# Patient Record
Sex: MALE | Race: ASIAN | NOT HISPANIC OR LATINO
[De-identification: names, ages, dates, MRNs, and addresses within clinical notes are randomized per-mention and may not be internally consistent; named-entity substitution may affect disease eponyms.]

---

## 2018-12-31 ENCOUNTER — TRANSCRIPTION ENCOUNTER (OUTPATIENT)
Age: 68
End: 2018-12-31

## 2018-12-31 ENCOUNTER — INPATIENT (INPATIENT)
Facility: HOSPITAL | Age: 68
LOS: 0 days | Discharge: HOME | DRG: 287 | End: 2019-01-01
Attending: INTERNAL MEDICINE | Admitting: INTERNAL MEDICINE
Payer: MEDICAID

## 2018-12-31 VITALS
OXYGEN SATURATION: 98 % | WEIGHT: 179.9 LBS | HEIGHT: 66 IN | SYSTOLIC BLOOD PRESSURE: 147 MMHG | TEMPERATURE: 98 F | DIASTOLIC BLOOD PRESSURE: 84 MMHG | HEART RATE: 76 BPM | RESPIRATION RATE: 16 BRPM

## 2018-12-31 DIAGNOSIS — I21.3 ST ELEVATION (STEMI) MYOCARDIAL INFARCTION OF UNSPECIFIED SITE: ICD-10-CM

## 2018-12-31 DIAGNOSIS — Z95.1 PRESENCE OF AORTOCORONARY BYPASS GRAFT: Chronic | ICD-10-CM

## 2018-12-31 LAB
ANION GAP SERPL CALC-SCNC: 18 MMOL/L — HIGH (ref 5–17)
BUN SERPL-MCNC: 18 MG/DL — SIGNIFICANT CHANGE UP (ref 7–23)
CALCIUM SERPL-MCNC: 9.3 MG/DL — SIGNIFICANT CHANGE UP (ref 8.4–10.5)
CHLORIDE SERPL-SCNC: 98 MMOL/L — SIGNIFICANT CHANGE UP (ref 96–108)
CO2 SERPL-SCNC: 23 MMOL/L — SIGNIFICANT CHANGE UP (ref 22–31)
CREAT SERPL-MCNC: 0.88 MG/DL — SIGNIFICANT CHANGE UP (ref 0.5–1.3)
GLUCOSE BLDC GLUCOMTR-MCNC: 178 MG/DL — HIGH (ref 70–99)
GLUCOSE BLDC GLUCOMTR-MCNC: 190 MG/DL — HIGH (ref 70–99)
GLUCOSE SERPL-MCNC: 232 MG/DL — HIGH (ref 70–99)
HCT VFR BLD CALC: 42.7 % — SIGNIFICANT CHANGE UP (ref 39–50)
HGB BLD-MCNC: 14.6 G/DL — SIGNIFICANT CHANGE UP (ref 13–17)
MCHC RBC-ENTMCNC: 31.4 PG — SIGNIFICANT CHANGE UP (ref 27–34)
MCHC RBC-ENTMCNC: 34.3 GM/DL — SIGNIFICANT CHANGE UP (ref 32–36)
MCV RBC AUTO: 91.6 FL — SIGNIFICANT CHANGE UP (ref 80–100)
PLATELET # BLD AUTO: 227 K/UL — SIGNIFICANT CHANGE UP (ref 150–400)
POTASSIUM SERPL-MCNC: 4 MMOL/L — SIGNIFICANT CHANGE UP (ref 3.5–5.3)
POTASSIUM SERPL-SCNC: 4 MMOL/L — SIGNIFICANT CHANGE UP (ref 3.5–5.3)
RBC # BLD: 4.66 M/UL — SIGNIFICANT CHANGE UP (ref 4.2–5.8)
RBC # FLD: 12.3 % — SIGNIFICANT CHANGE UP (ref 10.3–14.5)
SODIUM SERPL-SCNC: 139 MMOL/L — SIGNIFICANT CHANGE UP (ref 135–145)
WBC # BLD: 8.9 K/UL — SIGNIFICANT CHANGE UP (ref 3.8–10.5)
WBC # FLD AUTO: 8.9 K/UL — SIGNIFICANT CHANGE UP (ref 3.8–10.5)

## 2018-12-31 PROCEDURE — 93459 L HRT ART/GRFT ANGIO: CPT | Mod: 26,GC

## 2018-12-31 PROCEDURE — 99152 MOD SED SAME PHYS/QHP 5/>YRS: CPT | Mod: GC

## 2018-12-31 PROCEDURE — 76937 US GUIDE VASCULAR ACCESS: CPT | Mod: 26,GC

## 2018-12-31 PROCEDURE — 93010 ELECTROCARDIOGRAM REPORT: CPT

## 2018-12-31 RX ORDER — ISOSORBIDE MONONITRATE 60 MG/1
120 TABLET, EXTENDED RELEASE ORAL DAILY
Qty: 0 | Refills: 0 | Status: DISCONTINUED | OUTPATIENT
Start: 2018-12-31 | End: 2019-01-01

## 2018-12-31 RX ORDER — INSULIN LISPRO 100/ML
VIAL (ML) SUBCUTANEOUS AT BEDTIME
Qty: 0 | Refills: 0 | Status: DISCONTINUED | OUTPATIENT
Start: 2018-12-31 | End: 2019-01-01

## 2018-12-31 RX ORDER — INSULIN LISPRO 100/ML
VIAL (ML) SUBCUTANEOUS
Qty: 0 | Refills: 0 | Status: DISCONTINUED | OUTPATIENT
Start: 2018-12-31 | End: 2019-01-01

## 2018-12-31 RX ORDER — DEXTROSE 50 % IN WATER 50 %
15 SYRINGE (ML) INTRAVENOUS ONCE
Qty: 0 | Refills: 0 | Status: DISCONTINUED | OUTPATIENT
Start: 2018-12-31 | End: 2019-01-01

## 2018-12-31 RX ORDER — BUDESONIDE AND FORMOTEROL FUMARATE DIHYDRATE 160; 4.5 UG/1; UG/1
2 AEROSOL RESPIRATORY (INHALATION)
Qty: 0 | Refills: 0 | Status: DISCONTINUED | OUTPATIENT
Start: 2018-12-31 | End: 2019-01-01

## 2018-12-31 RX ORDER — DEXTROSE 50 % IN WATER 50 %
12.5 SYRINGE (ML) INTRAVENOUS ONCE
Qty: 0 | Refills: 0 | Status: DISCONTINUED | OUTPATIENT
Start: 2018-12-31 | End: 2019-01-01

## 2018-12-31 RX ORDER — CLOPIDOGREL BISULFATE 75 MG/1
75 TABLET, FILM COATED ORAL DAILY
Qty: 0 | Refills: 0 | Status: DISCONTINUED | OUTPATIENT
Start: 2018-12-31 | End: 2019-01-01

## 2018-12-31 RX ORDER — ATORVASTATIN CALCIUM 80 MG/1
80 TABLET, FILM COATED ORAL AT BEDTIME
Qty: 0 | Refills: 0 | Status: DISCONTINUED | OUTPATIENT
Start: 2018-12-31 | End: 2019-01-01

## 2018-12-31 RX ORDER — DEXTROSE 50 % IN WATER 50 %
25 SYRINGE (ML) INTRAVENOUS ONCE
Qty: 0 | Refills: 0 | Status: DISCONTINUED | OUTPATIENT
Start: 2018-12-31 | End: 2019-01-01

## 2018-12-31 RX ORDER — METOPROLOL TARTRATE 50 MG
50 TABLET ORAL DAILY
Qty: 0 | Refills: 0 | Status: DISCONTINUED | OUTPATIENT
Start: 2018-12-31 | End: 2019-01-01

## 2018-12-31 RX ORDER — TAMSULOSIN HYDROCHLORIDE 0.4 MG/1
0.4 CAPSULE ORAL AT BEDTIME
Qty: 0 | Refills: 0 | Status: DISCONTINUED | OUTPATIENT
Start: 2018-12-31 | End: 2019-01-01

## 2018-12-31 RX ORDER — INSULIN DETEMIR 100/ML (3)
10 INSULIN PEN (ML) SUBCUTANEOUS AT BEDTIME
Qty: 0 | Refills: 0 | Status: DISCONTINUED | OUTPATIENT
Start: 2018-12-31 | End: 2018-12-31

## 2018-12-31 RX ORDER — GLUCAGON INJECTION, SOLUTION 0.5 MG/.1ML
1 INJECTION, SOLUTION SUBCUTANEOUS ONCE
Qty: 0 | Refills: 0 | Status: DISCONTINUED | OUTPATIENT
Start: 2018-12-31 | End: 2019-01-01

## 2018-12-31 RX ORDER — INSULIN GLARGINE 100 [IU]/ML
10 INJECTION, SOLUTION SUBCUTANEOUS AT BEDTIME
Qty: 0 | Refills: 0 | Status: DISCONTINUED | OUTPATIENT
Start: 2018-12-31 | End: 2019-01-01

## 2018-12-31 RX ORDER — LOSARTAN POTASSIUM 100 MG/1
100 TABLET, FILM COATED ORAL DAILY
Qty: 0 | Refills: 0 | Status: DISCONTINUED | OUTPATIENT
Start: 2018-12-31 | End: 2019-01-01

## 2018-12-31 RX ORDER — SODIUM CHLORIDE 9 MG/ML
1000 INJECTION, SOLUTION INTRAVENOUS
Qty: 0 | Refills: 0 | Status: DISCONTINUED | OUTPATIENT
Start: 2018-12-31 | End: 2019-01-01

## 2018-12-31 RX ORDER — ALBUTEROL 90 UG/1
2 AEROSOL, METERED ORAL EVERY 6 HOURS
Qty: 0 | Refills: 0 | Status: DISCONTINUED | OUTPATIENT
Start: 2018-12-31 | End: 2019-01-01

## 2018-12-31 RX ORDER — ASPIRIN/CALCIUM CARB/MAGNESIUM 324 MG
81 TABLET ORAL DAILY
Qty: 0 | Refills: 0 | Status: DISCONTINUED | OUTPATIENT
Start: 2018-12-31 | End: 2019-01-01

## 2018-12-31 RX ORDER — GABAPENTIN 400 MG/1
400 CAPSULE ORAL THREE TIMES A DAY
Qty: 0 | Refills: 0 | Status: DISCONTINUED | OUTPATIENT
Start: 2018-12-31 | End: 2019-01-01

## 2018-12-31 RX ADMIN — TAMSULOSIN HYDROCHLORIDE 0.4 MILLIGRAM(S): 0.4 CAPSULE ORAL at 22:22

## 2018-12-31 RX ADMIN — ATORVASTATIN CALCIUM 80 MILLIGRAM(S): 80 TABLET, FILM COATED ORAL at 22:22

## 2018-12-31 RX ADMIN — ISOSORBIDE MONONITRATE 120 MILLIGRAM(S): 60 TABLET, EXTENDED RELEASE ORAL at 23:17

## 2018-12-31 RX ADMIN — BUDESONIDE AND FORMOTEROL FUMARATE DIHYDRATE 2 PUFF(S): 160; 4.5 AEROSOL RESPIRATORY (INHALATION) at 22:23

## 2018-12-31 RX ADMIN — GABAPENTIN 400 MILLIGRAM(S): 400 CAPSULE ORAL at 22:23

## 2018-12-31 RX ADMIN — INSULIN GLARGINE 10 UNIT(S): 100 INJECTION, SOLUTION SUBCUTANEOUS at 22:22

## 2018-12-31 RX ADMIN — ALBUTEROL 2 PUFF(S): 90 AEROSOL, METERED ORAL at 22:23

## 2018-12-31 NOTE — H&P CARDIOLOGY - PMH
BPH (benign prostatic hyperplasia)    Diabetes mellitus    HLD (hyperlipidemia)    HTN (hypertension)    Lacunar infarction

## 2018-12-31 NOTE — H&P CARDIOLOGY - HISTORY OF PRESENT ILLNESS
69 y/o male with PMHx of HTN, T2DM, CAD s/p CABG 09/2014 & GUILLE to ostial (09/2015), % with patent grafts ( with patent LIMA to LAD and patent SVG to diag ) presents to [facility] with chest pain and transferred to [facility] for further management. S/p cardiac cath revealed normal coronaries.

## 2019-01-01 VITALS
OXYGEN SATURATION: 98 % | DIASTOLIC BLOOD PRESSURE: 50 MMHG | HEART RATE: 64 BPM | RESPIRATION RATE: 17 BRPM | TEMPERATURE: 98 F | SYSTOLIC BLOOD PRESSURE: 97 MMHG

## 2019-01-01 LAB
GLUCOSE BLDC GLUCOMTR-MCNC: 256 MG/DL — HIGH (ref 70–99)
GLUCOSE BLDC GLUCOMTR-MCNC: 304 MG/DL — HIGH (ref 70–99)
HBA1C BLD-MCNC: 8 % — HIGH (ref 4–5.6)

## 2019-01-01 PROCEDURE — 76937 US GUIDE VASCULAR ACCESS: CPT

## 2019-01-01 PROCEDURE — C1769: CPT

## 2019-01-01 PROCEDURE — C1887: CPT

## 2019-01-01 PROCEDURE — 93459 L HRT ART/GRFT ANGIO: CPT

## 2019-01-01 PROCEDURE — 94640 AIRWAY INHALATION TREATMENT: CPT

## 2019-01-01 PROCEDURE — 82962 GLUCOSE BLOOD TEST: CPT

## 2019-01-01 PROCEDURE — 99152 MOD SED SAME PHYS/QHP 5/>YRS: CPT

## 2019-01-01 PROCEDURE — 83036 HEMOGLOBIN GLYCOSYLATED A1C: CPT

## 2019-01-01 PROCEDURE — C1894: CPT

## 2019-01-01 PROCEDURE — 85027 COMPLETE CBC AUTOMATED: CPT

## 2019-01-01 PROCEDURE — 80048 BASIC METABOLIC PNL TOTAL CA: CPT

## 2019-01-01 RX ORDER — LOSARTAN POTASSIUM 100 MG/1
1 TABLET, FILM COATED ORAL
Qty: 30 | Refills: 0
Start: 2019-01-01 | End: 2019-01-30

## 2019-01-01 RX ORDER — CLOPIDOGREL BISULFATE 75 MG/1
1 TABLET, FILM COATED ORAL
Qty: 90 | Refills: 0
Start: 2019-01-01 | End: 2019-03-31

## 2019-01-01 RX ORDER — INSULIN LISPRO 100/ML
VIAL (ML) SUBCUTANEOUS
Qty: 0 | Refills: 0 | Status: DISCONTINUED | OUTPATIENT
Start: 2019-01-01 | End: 2019-01-01

## 2019-01-01 RX ORDER — INSULIN LISPRO 100/ML
4 VIAL (ML) SUBCUTANEOUS ONCE
Qty: 0 | Refills: 0 | Status: COMPLETED | OUTPATIENT
Start: 2019-01-01 | End: 2019-01-01

## 2019-01-01 RX ADMIN — LOSARTAN POTASSIUM 100 MILLIGRAM(S): 100 TABLET, FILM COATED ORAL at 06:37

## 2019-01-01 RX ADMIN — Medication 6: at 11:48

## 2019-01-01 RX ADMIN — CLOPIDOGREL BISULFATE 75 MILLIGRAM(S): 75 TABLET, FILM COATED ORAL at 05:31

## 2019-01-01 RX ADMIN — Medication 4 UNIT(S): at 09:49

## 2019-01-01 RX ADMIN — Medication 50 MILLIGRAM(S): at 05:31

## 2019-01-01 RX ADMIN — BUDESONIDE AND FORMOTEROL FUMARATE DIHYDRATE 2 PUFF(S): 160; 4.5 AEROSOL RESPIRATORY (INHALATION) at 05:31

## 2019-01-01 RX ADMIN — Medication 81 MILLIGRAM(S): at 05:31

## 2019-01-01 RX ADMIN — GABAPENTIN 400 MILLIGRAM(S): 400 CAPSULE ORAL at 05:31

## 2019-01-01 RX ADMIN — Medication 4: at 08:07

## 2019-01-01 NOTE — DISCHARGE NOTE ADULT - PATIENT PORTAL LINK FT
You can access the [facility] Patient Portal, offered by [facility], by registering with the following website: [email or web address]

## 2019-01-01 NOTE — DISCHARGE NOTE ADULT - CARE PROVIDER_API CALL
Olman Ross), Internal Medicine  [address] [place] [address]  Phone: [phone]  Fax: [phone]

## 2019-01-01 NOTE — DISCHARGE NOTE ADULT - HOSPITAL COURSE
67 y/o male with PMHx of HTN, T2DM, CAD s/p CABG 09/2014 & GUILLE to ostial (09/2015), % with patent grafts ( with patent LIMA to LAD and patent SVG to diag ) presents to [facility] with chest pain and transferred to [facility] for further management. S/p cardiac cath revealed normal coronaries. 67 y/o male with PMHx of HTN, T2DM, CAD s/p CABG 09/2014 & GUILLE to ostial (09/2015), % with patent grafts ( with patent LIMA to LAD and patent SVG to diag ) presents to [facility] with chest pain and transferred to [facility] for further management. S/p cardiac cath revealing non-obstructive disease.  Pt tolerated procedure well with no post complications and hospitalization remained uneventful. Pt is hemodynamically stable and insertion site benign. No c/o chest pain or SOB. Discharge teaching provided to patient/caretaker and verbalized understanding of instructions. Pt is stable for discharge home as per Dr. Ramirez.

## 2019-01-01 NOTE — PROGRESS NOTE ADULT - ASSESSMENT
HPI:  67 y/o male with PMHx of HTN, T2DM, CAD s/p CABG 09/2014 & GUILLE to ostial (09/2015), % with patent grafts ( with patent LIMA to LAD and patent SVG to diag ) presents to [facility] with chest pain and transferred to [facility] for further management. S/p cardiac cath. (31 Dec 2018 21:49)  -Pt is s/p diagnostic C revealing non-obstructive disease  -VSS per flowsheet  -no events on tele  -Continue Atorvastatin 80mg, Imdur 120mg, Losartan 100mg, Toprol XL 50mg  -Unable uptitrate beta blocker as BP is borderline  -Pt with elevated blood sugars this AM- A1C 8.0. Instructed pt to follow up with PCP who manages his DM2 within 2 weeks of discharge  -Plan to discharge home today    Will d/w Dr. Ramirez HPI:  67 y/o male with PMHx of HTN, T2DM, CAD s/p CABG 09/2014 & GUILLE to ostial (09/2015), % with patent grafts ( with patent LIMA to LAD and patent SVG to diag ) presents to [facility] with chest pain and transferred to [facility] for further management. S/p cardiac cath. (31 Dec 2018 21:49)  -Pt is s/p diagnostic [facility] revealing non-obstructive disease  -VSS per flowsheet  -no events on tele  -Continue Atorvastatin 80mg, Imdur 120mg, Losartan 100mg, Toprol XL 50mg  -Unable uptitrate beta blocker as BP is borderline this AM after administration of meds  -Pt with elevated blood sugars this AM- A1C 8.0. Instructed pt to follow up with PCP who manages his DM2 within 2 weeks of discharge  -Plan to discharge home today    Will d/w Dr. Ramirez HPI:  67 y/o male with PMHx of HTN, T2DM, CAD s/p CABG 09/2014 & GUILLE to ostial (09/2015), % with patent grafts ( with patent LIMA to LAD and patent SVG to diag ) presents to [facility] with chest pain and transferred to [facility] for further management. S/p cardiac cath. (31 Dec 2018 21:49)  -Pt is s/p diagnostic C revealing non-obstructive disease  -VSS per flowsheet  -no events on tele  -Continue Atorvastatin 80mg, Imdur 120mg, Losartan 100mg, Toprol XL 50mg  -Unable uptitrate beta blocker as BP is borderline this AM after administration of meds  -Pt with elevated blood sugars this AM- A1C 8.0. Instructed pt to follow up with PCP who manages his DM2 within 2 weeks of discharge  -Follow up with cardiologist, Dr. Olman Ross, in 1 week  -Plan to discharge home today    Will d/w Dr. Ramirez HPI:  67 y/o male with PMHx of HTN, T2DM, CAD s/p CABG 09/2014 & GUILLE to ostial (09/2015), % with patent grafts ( with patent LIMA to LAD and patent SVG to diag ) presents to [facility] with chest pain and transferred to [facility] for further management. S/p cardiac cath. (31 Dec 2018 21:49)  -Pt is s/p diagnostic [facility] revealing non-obstructive disease  -VSS per flowsheet  -no events on tele  -Continue Atorvastatin 80mg, Imdur 120mg, Losartan 100mg, Toprol XL 50mg  -Unable uptitrate beta blocker as BP is borderline this AM after administration of meds  -Pt with elevated blood sugars this AM- A1C 8.0. Instructed pt to follow up with PCP who manages his DM2 within 2 weeks of discharge  -Follow up with cardiologist in 1 week  -Plan to discharge home today    Will d/w Dr. Ramirez

## 2019-01-01 NOTE — PROGRESS NOTE ADULT - SUBJECTIVE AND OBJECTIVE BOX
Cardiology Progress Note  Spectra [id number]    Chief Complaint: chest pain- transferred to [facility] for urgent cardiac catheterization    Interval Events: no events      MEDICATIONS:  aspirin enteric coated 81 milliGRAM(s) Oral daily  clopidogrel Tablet 75 milliGRAM(s) Oral daily  isosorbide   mononitrate ER Tablet (IMDUR) 120 milliGRAM(s) Oral daily  losartan 100 milliGRAM(s) Oral daily  metoprolol succinate ER 50 milliGRAM(s) Oral daily  tamsulosin 0.4 milliGRAM(s) Oral at bedtime      ALBUTerol    90 MICROgram(s) HFA Inhaler 2 Puff(s) Inhalation every 6 hours PRN  buDESOnide 160 MICROgram(s)/formoterol 4.5 MICROgram(s) Inhaler 2 Puff(s) Inhalation two times a day    gabapentin 400 milliGRAM(s) Oral three times a day      atorvastatin 80 milliGRAM(s) Oral at bedtime  dextrose 40% Gel 15 Gram(s) Oral once PRN  dextrose 50% Injectable 12.5 Gram(s) IV Push once  dextrose 50% Injectable 25 Gram(s) IV Push once  dextrose 50% Injectable 25 Gram(s) IV Push once  dextrose 50% Injectable 12.5 Gram(s) IV Push once  dextrose 50% Injectable 25 Gram(s) IV Push once  dextrose 50% Injectable 25 Gram(s) IV Push once  glucagon  Injectable 1 milliGRAM(s) IntraMuscular once PRN  glucagon  Injectable 1 milliGRAM(s) IntraMuscular once PRN  insulin glargine Injectable (LANTUS) 10 Unit(s) SubCutaneous at bedtime  insulin lispro (HumaLOG) corrective regimen sliding scale   SubCutaneous three times a day before meals  insulin lispro (HumaLOG) corrective regimen sliding scale   SubCutaneous at bedtime    dextrose 5%. 1000 milliLiter(s) IV Continuous <Continuous>        PHYSICAL EXAM:  T(C): 36.7 (19 @ 05:28), Max: 36.7 (18 @ 19:30)  HR: 64 (19 @ 06:36) (64 - 87)  BP: 102/62 (19 @ 06:36) (99/56 - 151/88)  RR: 17 (19 @ 05:28) (16 - 17)  SpO2: 98% (19 @ 05:28) (97% - 99%)  Wt(kg): --  I&O's Summary    31 Dec 2018 07:01  -  2019 07:00  --------------------------------------------------------  IN: 360 mL / OUT: 500 mL / NET: -140 mL      Daily Height in cm: 167.64 (31 Dec 2018 21:49)    Daily Weight in k.6 (31 Dec 2018 21:49)    Appearance: Normal	  HEENT:   Normal oral mucosa, PERRL, EOMI	  Cardiovascular: Normal S1 S2, No JVD, No murmurs, No edema  Respiratory: Lungs clear to auscultation	  Psychiatry: A & O x 3, Mood & affect appropriate  Gastrointestinal:  soft nt nd, normoactive bs  Skin: No rashes, No ecchymoses, No cyanosis	  Neurologic: grossly nonfocal  Extremities: Normal range of motion, No clubbing, cyanosis  Vascular: right femoral artery access site +hemostasis no hematoma no eccymosis   faint pedal pulse verified by doppler    LABS:	 	    CBC Full  -  ( 31 Dec 2018 22:58 )  WBC Count : 8.9 K/uL  Hemoglobin : 14.6 g/dL  Hematocrit : 42.7 %  Platelet Count - Automated : 227 K/uL  Mean Cell Volume : 91.6 fl  Mean Cell Hemoglobin : 31.4 pg  Mean Cell Hemoglobin Concentration : 34.3 gm/dL  Auto Neutrophil # : x  Auto Lymphocyte # : x  Auto Monocyte # : x  Auto Eosinophil # : x  Auto Basophil # : x  Auto Neutrophil % : x  Auto Lymphocyte % : x  Auto Monocyte % : x  Auto Eosinophil % : x  Auto Basophil % : x    -    139  |  98  |  18  ----------------------------<  232<H>  4.0   |  23  |  0.88    Ca    9.3      31 Dec 2018 22:58        proBNP:   Lipid Profile:   HgA1c: Hemoglobin A1C, Whole Blood: 8.0 % ( @ 04:34)    TSH:     CARDIAC MARKERS:            TELEMETRY: SR 70-90	    ECG:  	  RADIOLOGY:  OTHER: 	    PREVIOUS DIAGNOSTIC TESTING:    [ ] Echocardiogram: 18 in chart- technically difficult study, mildly reduced  LV systolic function, unable to assess diastolic function, apex and apical akinesis, normal RV size and function, dilated LA   [ ] Catheterization: diagnostic - non obstructive disease (official report pending)  [ ] Stress Test: Cardiology Progress Note  Spectra [id number]    Chief Complaint: chest pain- transferred to [facility] for urgent cardiac catheterization    Interval Events: no events      MEDICATIONS:  aspirin enteric coated 81 milliGRAM(s) Oral daily  clopidogrel Tablet 75 milliGRAM(s) Oral daily  isosorbide   mononitrate ER Tablet (IMDUR) 120 milliGRAM(s) Oral daily  losartan 100 milliGRAM(s) Oral daily  metoprolol succinate ER 50 milliGRAM(s) Oral daily  tamsulosin 0.4 milliGRAM(s) Oral at bedtime      ALBUTerol    90 MICROgram(s) HFA Inhaler 2 Puff(s) Inhalation every 6 hours PRN  buDESOnide 160 MICROgram(s)/formoterol 4.5 MICROgram(s) Inhaler 2 Puff(s) Inhalation two times a day    gabapentin 400 milliGRAM(s) Oral three times a day      atorvastatin 80 milliGRAM(s) Oral at bedtime  dextrose 40% Gel 15 Gram(s) Oral once PRN  dextrose 50% Injectable 12.5 Gram(s) IV Push once  dextrose 50% Injectable 25 Gram(s) IV Push once  dextrose 50% Injectable 25 Gram(s) IV Push once  dextrose 50% Injectable 12.5 Gram(s) IV Push once  dextrose 50% Injectable 25 Gram(s) IV Push once  dextrose 50% Injectable 25 Gram(s) IV Push once  glucagon  Injectable 1 milliGRAM(s) IntraMuscular once PRN  glucagon  Injectable 1 milliGRAM(s) IntraMuscular once PRN  insulin glargine Injectable (LANTUS) 10 Unit(s) SubCutaneous at bedtime  insulin lispro (HumaLOG) corrective regimen sliding scale   SubCutaneous three times a day before meals  insulin lispro (HumaLOG) corrective regimen sliding scale   SubCutaneous at bedtime    dextrose 5%. 1000 milliLiter(s) IV Continuous <Continuous>        PHYSICAL EXAM:  T(C): 36.7 (19 @ 05:28), Max: 36.7 (18 @ 19:30)  HR: 64 (19 @ 06:36) (64 - 87)  BP: 102/62 (19 @ 06:36) (99/56 - 151/88)  RR: 17 (19 @ 05:28) (16 - 17)  SpO2: 98% (19 @ 05:28) (97% - 99%)  Wt(kg): --  I&O's Summary    31 Dec 2018 07:01  -  2019 07:00  --------------------------------------------------------  IN: 360 mL / OUT: 500 mL / NET: -140 mL      Daily Height in cm: 167.64 (31 Dec 2018 21:49)    Daily Weight in k.6 (31 Dec 2018 21:49)    Appearance: Normal	  HEENT:   Normal oral mucosa, PERRL, EOMI	  Cardiovascular: Normal S1 S2, No JVD, No murmurs, No edema  Respiratory: Lungs clear to auscultation	  Psychiatry: A & O x 3, Mood & affect appropriate  Gastrointestinal:  soft nt nd, normoactive bs  Skin: No rashes, No ecchymoses, No cyanosis	  Neurologic: grossly nonfocal  Extremities: Normal range of motion, No clubbing, cyanosis  Vascular: right femoral artery access site +hemostasis no hematoma no eccymosis   faint pedal pulse verified by doppler    LABS:	 	    CBC Full  -  ( 31 Dec 2018 22:58 )  WBC Count : 8.9 K/uL  Hemoglobin : 14.6 g/dL  Hematocrit : 42.7 %  Platelet Count - Automated : 227 K/uL  Mean Cell Volume : 91.6 fl  Mean Cell Hemoglobin : 31.4 pg  Mean Cell Hemoglobin Concentration : 34.3 gm/dL  Auto Neutrophil # : x  Auto Lymphocyte # : x  Auto Monocyte # : x  Auto Eosinophil # : x  Auto Basophil # : x  Auto Neutrophil % : x  Auto Lymphocyte % : x  Auto Monocyte % : x  Auto Eosinophil % : x  Auto Basophil % : x        139  |  98  |  18  ----------------------------<  232<H>  4.0   |  23  |  0.88    Ca    9.3      31 Dec 2018 22:58        proBNP:   Lipid Profile:   HgA1c: Hemoglobin A1C, Whole Blood: 8.0 % ( @ 04:34)    TSH:     CARDIAC MARKERS:            TELEMETRY: SR 70-90	    ECG:  SB 1HB 59, no acute ST or T wave changes	  RADIOLOGY:  OTHER: 	    PREVIOUS DIAGNOSTIC TESTING:    [ ] Echocardiogram: 18 in chart- technically difficult study, mildly reduced  LV systolic function, unable to assess diastolic function, apex and apical akinesis, normal RV size and function, dilated LA   [ ] Catheterization: diagnostic - non obstructive disease (official report pending)  [ ] Stress Test:

## 2019-01-01 NOTE — DISCHARGE NOTE ADULT - MEDICATION SUMMARY - MEDICATIONS TO TAKE
Yes I will START or STAY ON the medications listed below when I get home from the hospital:    aspirin 81 mg oral delayed release tablet  -- 1 tab(s) by mouth once a day  -- Indication: For STENT    losartan 100 mg oral tablet  -- 1 tab(s) by mouth once a day  -- Indication: For BLOOD PRESSURE    Flomax 0.4 mg oral capsule  -- 1 cap(s) by mouth once a day  -- Indication: For PROSTATE    Imdur 120 mg oral tablet, extended release  -- 1 tab(s) by mouth once a day (in the morning)  -- Indication: For CHEST PAIN    Neurontin 400 mg oral capsule  -- 1 cap(s) by mouth 3 times a day  -- Indication: For PAIN    glipiZIDE 10 mg oral tablet  -- 1 tab(s) by mouth once a day  -- Indication: For Diabetes mellitus    Levemir 100 units/mL subcutaneous solution  -- 10 unit(s) subcutaneous  -- Indication: For Diabetes mellitus    Janumet 50 mg-1000 mg oral tablet  -- 1 tab(s) by mouth 2 times a day HOLD FOR 2 DAYS POST CATH- RESTART 1/3/19  -- Indication: For Diabetes mellitus    Lipitor 80 mg oral tablet  -- 1 tab(s) by mouth once a day  -- Indication: For CHOLESTEROL, HEART DISEASE    Plavix 75 mg oral tablet  -- 1 tab(s) by mouth once a day  -- Indication: For STENT    Toprol-XL 50 mg oral tablet, extended release  -- 1 tab(s) by mouth once a day  -- Indication: For BLOOD PRESSURE    Symbicort 160 mcg-4.5 mcg/inh inhalation aerosol  -- 2 puff(s) inhaled 2 times a day  -- Indication: For WHEEZING    albuterol 90 mcg/inh inhalation powder  -- 2 puff(s) inhaled every 6 hours  -- Indication: For WHEEZING    ferrous sulfate 325 mg (65 mg elemental iron) oral tablet  -- orally 2 times a week  -- Indication: For SUPPLEMENT    Liquifilm Tears preserved ophthalmic solution  -- 1 drop(s) to each affected eye 2 times a day  -- Indication: For TEARS

## 2019-01-01 NOTE — DISCHARGE NOTE ADULT - CARE PLAN
Principal Discharge DX:	CAD (coronary artery disease)  Goal:	Pt remains chest pain free and understands post cath discharge instructions  Assessment and plan of treatment:	No heavy lifting, strenuous activity, bending, straining or unnecessary stair climbing  for 2 weeks. No sex for 1 week.  No driving for 2 days. You may shower 24 hours following procedure but avoid baths and swimming for 1 week. Check groin site for bleeding and/or swelling daily following procedure. Call your doctor/cardiologist immediately should it occur or if you have increased/persistent pain at the site. Follow up with your cardiologist in 1- 2 weeks. You may call [facility] at [phone] or [phone] after office hours and weekends  with any questions or concerns following your procedure. Take medications as prescribed.  Secondary Diagnosis:	Diabetes mellitus  Goal:	Your hemoglobin A1C will be between 7-8  Assessment and plan of treatment:	Continue to follow with your primary care MD or your endocrinologist.  Follow a heart healthy diabetic diet. If you check your fingerstick glucose at home, call your MD if it is greater than 250mg/dL on 2 occasions or less than 100mg/dL on 2 occasions. Know signs of low blood sugar, such as: dizziness, shakiness, sweating, confusion, hunger, nervousness-drink 4 ounces apple juice if occurs and call your doctor. Know early signs of high blood sugar, such as: frequent urination, increased thirst, blurry vision, fatigue, headache - call your doctor if this occurs. Follow with other practitioners to care for your diabetes, such as ophthalmologist and podiatrist.  Secondary Diagnosis:	HTN (hypertension)  Goal:	Your blood pressure will be controlled.  Assessment and plan of treatment:	Continue with your blood pressure medications; eat a heart healthy diet with low salt diet; exercise regularly (consult with your physician or cardiologist first); maintain a heart healthy weight; if you smoke - quit (A resource to help you stop smoking is the [facility] – phone number [phone].); include healthy ways to manage stress. Continue to follow with your primary care physician or cardiologist.  Secondary Diagnosis:	HLD (hyperlipidemia)  Goal:	Your LDL cholesterol will be less than 70mg/dL  Assessment and plan of treatment:	Continue with your cholesterol medications. Eat a heart healthy diet that is low in saturated fats and salt, and includes whole grains, fruits, vegetables and lean protein; exercise regularly (consult with your physician or cardiologist first); maintain a heart healthy weight. Continue to follow with your primary physician or cardiologist for treatment goals, continue medication, have liver function testing every 3 months as anti lipid medications can cause liver irritation. If you smoke - quit (A resource to help you stop smoking is the [facility] – phone number [phone].).

## 2019-01-01 NOTE — DISCHARGE NOTE ADULT - PLAN OF CARE
Pt remains chest pain free and understands post cath discharge instructions No heavy lifting, strenuous activity, bending, straining or unnecessary stair climbing  for 2 weeks. No sex for 1 week.  No driving for 2 days. You may shower 24 hours following procedure but avoid baths and swimming for 1 week. Check groin site for bleeding and/or swelling daily following procedure. Call your doctor/cardiologist immediately should it occur or if you have increased/persistent pain at the site. Follow up with your cardiologist in 1- 2 weeks. You may call [facility] at [phone] or [phone] after office hours and weekends  with any questions or concerns following your procedure. Take medications as prescribed. Your hemoglobin A1C will be between 7-8 Continue to follow with your primary care MD or your endocrinologist.  Follow a heart healthy diabetic diet. If you check your fingerstick glucose at home, call your MD if it is greater than 250mg/dL on 2 occasions or less than 100mg/dL on 2 occasions. Know signs of low blood sugar, such as: dizziness, shakiness, sweating, confusion, hunger, nervousness-drink 4 ounces apple juice if occurs and call your doctor. Know early signs of high blood sugar, such as: frequent urination, increased thirst, blurry vision, fatigue, headache - call your doctor if this occurs. Follow with other practitioners to care for your diabetes, such as ophthalmologist and podiatrist. Your blood pressure will be controlled. Continue with your blood pressure medications; eat a heart healthy diet with low salt diet; exercise regularly (consult with your physician or cardiologist first); maintain a heart healthy weight; if you smoke - quit (A resource to help you stop smoking is the [facility] – phone number [phone].); include healthy ways to manage stress. Continue to follow with your primary care physician or cardiologist. Your LDL cholesterol will be less than 70mg/dL Continue with your cholesterol medications. Eat a heart healthy diet that is low in saturated fats and salt, and includes whole grains, fruits, vegetables and lean protein; exercise regularly (consult with your physician or cardiologist first); maintain a heart healthy weight. Continue to follow with your primary physician or cardiologist for treatment goals, continue medication, have liver function testing every 3 months as anti lipid medications can cause liver irritation. If you smoke - quit (A resource to help you stop smoking is the [facility] – phone number [phone].).

## 2019-01-01 NOTE — PROGRESS NOTE ADULT - SUBJECTIVE AND OBJECTIVE BOX
68y old  Male who presents with a chief complaint of chest pain now s/p diagnostic cath via left femoral access site         Allergies    No Known Allergies    Intolerances        Medications:  ALBUTerol    90 MICROgram(s) HFA Inhaler 2 Puff(s) Inhalation every 6 hours PRN  aspirin enteric coated 81 milliGRAM(s) Oral daily  atorvastatin 80 milliGRAM(s) Oral at bedtime  buDESOnide 160 MICROgram(s)/formoterol 4.5 MICROgram(s) Inhaler 2 Puff(s) Inhalation two times a day  clopidogrel Tablet 75 milliGRAM(s) Oral daily  dextrose 40% Gel 15 Gram(s) Oral once PRN  dextrose 5%. 1000 milliLiter(s) IV Continuous <Continuous>  dextrose 50% Injectable 12.5 Gram(s) IV Push once  dextrose 50% Injectable 25 Gram(s) IV Push once  dextrose 50% Injectable 25 Gram(s) IV Push once  dextrose 50% Injectable 12.5 Gram(s) IV Push once  dextrose 50% Injectable 25 Gram(s) IV Push once  dextrose 50% Injectable 25 Gram(s) IV Push once  gabapentin 400 milliGRAM(s) Oral three times a day  glucagon  Injectable 1 milliGRAM(s) IntraMuscular once PRN  glucagon  Injectable 1 milliGRAM(s) IntraMuscular once PRN  insulin glargine Injectable (LANTUS) 10 Unit(s) SubCutaneous at bedtime  insulin lispro (HumaLOG) corrective regimen sliding scale   SubCutaneous three times a day before meals  insulin lispro (HumaLOG) corrective regimen sliding scale   SubCutaneous at bedtime  isosorbide   mononitrate ER Tablet (IMDUR) 120 milliGRAM(s) Oral daily  losartan 100 milliGRAM(s) Oral daily  metoprolol succinate ER 50 milliGRAM(s) Oral daily  tamsulosin 0.4 milliGRAM(s) Oral at bedtime      Vitals:  T(C): 36.7 (18 @ 21:49), Max: 36.7 (18 @ 19:30)  HR: 68 (18 @ 23:45) (67 - 87)  BP: 126/83 (18 @ 23:45) (126/83 - 151/88)  BP(mean): 105 (18 @ 21:49) (105 - 105)  RR: 17 (18 @ 23:45) (16 - 17)  SpO2: 98% (-18 @ 23:45) (97% - 99%)  Wt(kg): --  Daily Height in cm: 167.64 (31 Dec 2018 21:49)    Daily Weight in k.6 (31 Dec 2018 21:49)  I&O's Summary        Physical Exam:  Appearance: Normal  Eyes: PERRL, EOMI  HENT: Normal oral [place], NC/AT  Cardiovascular: S1S2, RRR, No M/R/G, no JVD, No Lower extremity edema  Procedural Access Site: Right femoral access site, No hematoma, Non-tender to palpation, 2+ pulse, No bruit, No Ecchymosis  Respiratory: Clear to auscultation bilaterally  Gastrointestinal: Soft, Non tender, Normal Bowel Sounds  Musculoskeletal: No clubbing, No joint deformity   Neurologic: Non-focal  Lymphatic: No lymphadenopathy  Psychiatry: AAOx3, Mood & affect appropriate  Skin: No rashes, No ecchymoses, No cyanosis        139  |  98  |  18  ----------------------------<  232<H>  4.0   |  23  |  0.88    Ca    9.3      31 Dec 2018 22:58              Interpretation of Telemetry:    ASSESSMENT / PLAN  68y old  Male who presents with a chief complaint of chest pain now s/p diagnostic cath via left femoral access site. Pt tolerated the procedure well, cardiac cath site benign. Discharge pending

## 2019-01-02 RX ORDER — SITAGLIPTIN AND METFORMIN HYDROCHLORIDE 500; 50 MG/1; MG/1
1 TABLET, FILM COATED ORAL
Qty: 0 | Refills: 0 | COMMUNITY

## 2019-01-02 RX ORDER — LOSARTAN POTASSIUM 100 MG/1
1 TABLET, FILM COATED ORAL
Qty: 0 | Refills: 0 | COMMUNITY

## 2019-01-04 LAB — GLUCOSE BLDC GLUCOMTR-MCNC: 317 MG/DL — HIGH (ref 70–99)

## 2019-01-24 ENCOUNTER — APPOINTMENT (OUTPATIENT)
Dept: CARDIOLOGY | Facility: HOSPITAL | Age: 69
End: 2019-01-24

## 2020-06-12 ENCOUNTER — INPATIENT (INPATIENT)
Facility: HOSPITAL | Age: 70
LOS: 5 days | Discharge: HOME | End: 2020-06-18
Attending: INTERNAL MEDICINE | Admitting: INTERNAL MEDICINE
Payer: MEDICAID

## 2020-06-12 VITALS
HEART RATE: 78 BPM | RESPIRATION RATE: 16 BRPM | OXYGEN SATURATION: 100 % | TEMPERATURE: 98 F | SYSTOLIC BLOOD PRESSURE: 137 MMHG | DIASTOLIC BLOOD PRESSURE: 98 MMHG

## 2020-06-12 DIAGNOSIS — J44.9 CHRONIC OBSTRUCTIVE PULMONARY DISEASE, UNSPECIFIED: ICD-10-CM

## 2020-06-12 DIAGNOSIS — E11.9 TYPE 2 DIABETES MELLITUS WITHOUT COMPLICATIONS: ICD-10-CM

## 2020-06-12 DIAGNOSIS — Z95.1 PRESENCE OF AORTOCORONARY BYPASS GRAFT: Chronic | ICD-10-CM

## 2020-06-12 DIAGNOSIS — I50.9 HEART FAILURE, UNSPECIFIED: ICD-10-CM

## 2020-06-12 DIAGNOSIS — J96.90 RESPIRATORY FAILURE, UNSPECIFIED, UNSPECIFIED WHETHER WITH HYPOXIA OR HYPERCAPNIA: ICD-10-CM

## 2020-06-12 DIAGNOSIS — Z29.9 ENCOUNTER FOR PROPHYLACTIC MEASURES, UNSPECIFIED: ICD-10-CM

## 2020-06-12 DIAGNOSIS — I10 ESSENTIAL (PRIMARY) HYPERTENSION: ICD-10-CM

## 2020-06-12 DIAGNOSIS — E78.5 HYPERLIPIDEMIA, UNSPECIFIED: ICD-10-CM

## 2020-06-12 DIAGNOSIS — N40.0 BENIGN PROSTATIC HYPERPLASIA WITHOUT LOWER URINARY TRACT SYMPTOMS: ICD-10-CM

## 2020-06-12 PROBLEM — I63.81 OTHER CEREBRAL INFARCTION DUE TO OCCLUSION OR STENOSIS OF SMALL ARTERY: Chronic | Status: ACTIVE | Noted: 2018-12-31

## 2020-06-12 LAB
ALBUMIN SERPL ELPH-MCNC: 4.5 G/DL — SIGNIFICANT CHANGE UP (ref 3.3–5)
ALP SERPL-CCNC: 63 U/L — SIGNIFICANT CHANGE UP (ref 40–120)
ALT FLD-CCNC: 33 U/L — SIGNIFICANT CHANGE UP (ref 4–41)
ANION GAP SERPL CALC-SCNC: 17 MMO/L — HIGH (ref 7–14)
ANION GAP SERPL CALC-SCNC: 18 MMO/L — HIGH (ref 7–14)
APTT BLD: 28.3 SEC — SIGNIFICANT CHANGE UP (ref 27.5–36.3)
AST SERPL-CCNC: 34 U/L — SIGNIFICANT CHANGE UP (ref 4–40)
BASE EXCESS BLDV CALC-SCNC: -3.5 MMOL/L — SIGNIFICANT CHANGE UP
BASE EXCESS BLDV CALC-SCNC: -4.6 MMOL/L — SIGNIFICANT CHANGE UP
BASOPHILS # BLD AUTO: 0.03 K/UL — SIGNIFICANT CHANGE UP (ref 0–0.2)
BASOPHILS NFR BLD AUTO: 0.3 % — SIGNIFICANT CHANGE UP (ref 0–2)
BILIRUB SERPL-MCNC: 0.6 MG/DL — SIGNIFICANT CHANGE UP (ref 0.2–1.2)
BLOOD GAS VENOUS - CREATININE: 1.35 MG/DL — HIGH (ref 0.5–1.3)
BLOOD GAS VENOUS - CREATININE: 1.39 MG/DL — HIGH (ref 0.5–1.3)
BUN SERPL-MCNC: 28 MG/DL — HIGH (ref 7–23)
BUN SERPL-MCNC: 29 MG/DL — HIGH (ref 7–23)
CALCIUM SERPL-MCNC: 9.2 MG/DL — SIGNIFICANT CHANGE UP (ref 8.4–10.5)
CALCIUM SERPL-MCNC: 9.3 MG/DL — SIGNIFICANT CHANGE UP (ref 8.4–10.5)
CHLORIDE BLDV-SCNC: 100 MMOL/L — SIGNIFICANT CHANGE UP (ref 96–108)
CHLORIDE BLDV-SCNC: 103 MMOL/L — SIGNIFICANT CHANGE UP (ref 96–108)
CHLORIDE SERPL-SCNC: 96 MMOL/L — LOW (ref 98–107)
CHLORIDE SERPL-SCNC: 96 MMOL/L — LOW (ref 98–107)
CO2 SERPL-SCNC: 18 MMOL/L — LOW (ref 22–31)
CO2 SERPL-SCNC: 19 MMOL/L — LOW (ref 22–31)
CREAT SERPL-MCNC: 1.31 MG/DL — HIGH (ref 0.5–1.3)
CREAT SERPL-MCNC: 1.39 MG/DL — HIGH (ref 0.5–1.3)
EOSINOPHIL # BLD AUTO: 0.03 K/UL — SIGNIFICANT CHANGE UP (ref 0–0.5)
EOSINOPHIL NFR BLD AUTO: 0.3 % — SIGNIFICANT CHANGE UP (ref 0–6)
GAS PNL BLDV: 131 MMOL/L — LOW (ref 136–146)
GAS PNL BLDV: 133 MMOL/L — LOW (ref 136–146)
GLUCOSE BLDV-MCNC: 194 MG/DL — HIGH (ref 70–99)
GLUCOSE BLDV-MCNC: 242 MG/DL — HIGH (ref 70–99)
GLUCOSE SERPL-MCNC: 204 MG/DL — HIGH (ref 70–99)
GLUCOSE SERPL-MCNC: 260 MG/DL — HIGH (ref 70–99)
HCO3 BLDV-SCNC: 20 MMOL/L — SIGNIFICANT CHANGE UP (ref 20–27)
HCO3 BLDV-SCNC: 21 MMOL/L — SIGNIFICANT CHANGE UP (ref 20–27)
HCT VFR BLD CALC: 36.3 % — LOW (ref 39–50)
HCT VFR BLDV CALC: 37.2 % — LOW (ref 39–51)
HCT VFR BLDV CALC: 37.7 % — LOW (ref 39–51)
HGB BLD-MCNC: 11.4 G/DL — LOW (ref 13–17)
HGB BLDV-MCNC: 12.1 G/DL — LOW (ref 13–17)
HGB BLDV-MCNC: 12.3 G/DL — LOW (ref 13–17)
IMM GRANULOCYTES NFR BLD AUTO: 0.5 % — SIGNIFICANT CHANGE UP (ref 0–1.5)
INR BLD: 0.96 — SIGNIFICANT CHANGE UP (ref 0.88–1.17)
LACTATE BLDV-MCNC: 3.2 MMOL/L — HIGH (ref 0.5–2)
LACTATE BLDV-MCNC: 3.5 MMOL/L — HIGH (ref 0.5–2)
LIDOCAIN IGE QN: 19.4 U/L — SIGNIFICANT CHANGE UP (ref 7–60)
LYMPHOCYTES # BLD AUTO: 0.63 K/UL — LOW (ref 1–3.3)
LYMPHOCYTES # BLD AUTO: 5.7 % — LOW (ref 13–44)
MAGNESIUM SERPL-MCNC: 2.1 MG/DL — SIGNIFICANT CHANGE UP (ref 1.6–2.6)
MCHC RBC-ENTMCNC: 27.7 PG — SIGNIFICANT CHANGE UP (ref 27–34)
MCHC RBC-ENTMCNC: 31.4 % — LOW (ref 32–36)
MCV RBC AUTO: 88.3 FL — SIGNIFICANT CHANGE UP (ref 80–100)
MONOCYTES # BLD AUTO: 0.85 K/UL — SIGNIFICANT CHANGE UP (ref 0–0.9)
MONOCYTES NFR BLD AUTO: 7.7 % — SIGNIFICANT CHANGE UP (ref 2–14)
NEUTROPHILS # BLD AUTO: 9.44 K/UL — HIGH (ref 1.8–7.4)
NEUTROPHILS NFR BLD AUTO: 85.5 % — HIGH (ref 43–77)
NRBC # FLD: 0 K/UL — SIGNIFICANT CHANGE UP (ref 0–0)
NT-PROBNP SERPL-SCNC: 2523 PG/ML — SIGNIFICANT CHANGE UP
PCO2 BLDV: 41 MMHG — SIGNIFICANT CHANGE UP (ref 41–51)
PCO2 BLDV: 48 MMHG — SIGNIFICANT CHANGE UP (ref 41–51)
PH BLDV: 7.27 PH — LOW (ref 7.32–7.43)
PH BLDV: 7.34 PH — SIGNIFICANT CHANGE UP (ref 7.32–7.43)
PHOSPHATE SERPL-MCNC: 2.8 MG/DL — SIGNIFICANT CHANGE UP (ref 2.5–4.5)
PLATELET # BLD AUTO: 246 K/UL — SIGNIFICANT CHANGE UP (ref 150–400)
PMV BLD: 11.9 FL — SIGNIFICANT CHANGE UP (ref 7–13)
PO2 BLDV: 50 MMHG — HIGH (ref 35–40)
PO2 BLDV: 58 MMHG — HIGH (ref 35–40)
POTASSIUM BLDV-SCNC: 5.1 MMOL/L — HIGH (ref 3.4–4.5)
POTASSIUM BLDV-SCNC: 5.3 MMOL/L — HIGH (ref 3.4–4.5)
POTASSIUM SERPL-MCNC: 5.4 MMOL/L — HIGH (ref 3.5–5.3)
POTASSIUM SERPL-MCNC: 5.4 MMOL/L — HIGH (ref 3.5–5.3)
POTASSIUM SERPL-SCNC: 5.4 MMOL/L — HIGH (ref 3.5–5.3)
POTASSIUM SERPL-SCNC: 5.4 MMOL/L — HIGH (ref 3.5–5.3)
PROT SERPL-MCNC: 7.6 G/DL — SIGNIFICANT CHANGE UP (ref 6–8.3)
PROTHROM AB SERPL-ACNC: 11 SEC — SIGNIFICANT CHANGE UP (ref 9.8–13.1)
RBC # BLD: 4.11 M/UL — LOW (ref 4.2–5.8)
RBC # FLD: 14.6 % — HIGH (ref 10.3–14.5)
SAO2 % BLDV: 81.1 % — SIGNIFICANT CHANGE UP (ref 60–85)
SAO2 % BLDV: 85.4 % — HIGH (ref 60–85)
SODIUM SERPL-SCNC: 132 MMOL/L — LOW (ref 135–145)
SODIUM SERPL-SCNC: 132 MMOL/L — LOW (ref 135–145)
TROPONIN T, HIGH SENSITIVITY: 20 NG/L — SIGNIFICANT CHANGE UP (ref ?–14)
TSH SERPL-MCNC: 1.05 UIU/ML — SIGNIFICANT CHANGE UP (ref 0.27–4.2)
WBC # BLD: 11.03 K/UL — HIGH (ref 3.8–10.5)
WBC # FLD AUTO: 11.03 K/UL — HIGH (ref 3.8–10.5)

## 2020-06-12 PROCEDURE — 71045 X-RAY EXAM CHEST 1 VIEW: CPT | Mod: 26

## 2020-06-12 PROCEDURE — 99291 CRITICAL CARE FIRST HOUR: CPT

## 2020-06-12 PROCEDURE — 99223 1ST HOSP IP/OBS HIGH 75: CPT

## 2020-06-12 RX ORDER — DEXTROSE 50 % IN WATER 50 %
25 SYRINGE (ML) INTRAVENOUS ONCE
Refills: 0 | Status: DISCONTINUED | OUTPATIENT
Start: 2020-06-12 | End: 2020-06-18

## 2020-06-12 RX ORDER — CLOPIDOGREL BISULFATE 75 MG/1
75 TABLET, FILM COATED ORAL DAILY
Refills: 0 | Status: DISCONTINUED | OUTPATIENT
Start: 2020-06-12 | End: 2020-06-18

## 2020-06-12 RX ORDER — GLUCAGON INJECTION, SOLUTION 0.5 MG/.1ML
1 INJECTION, SOLUTION SUBCUTANEOUS ONCE
Refills: 0 | Status: DISCONTINUED | OUTPATIENT
Start: 2020-06-12 | End: 2020-06-18

## 2020-06-12 RX ORDER — SITAGLIPTIN AND METFORMIN HYDROCHLORIDE 500; 50 MG/1; MG/1
1 TABLET, FILM COATED ORAL
Qty: 0 | Refills: 0 | DISCHARGE

## 2020-06-12 RX ORDER — DEXTROSE 50 % IN WATER 50 %
12.5 SYRINGE (ML) INTRAVENOUS ONCE
Refills: 0 | Status: DISCONTINUED | OUTPATIENT
Start: 2020-06-12 | End: 2020-06-18

## 2020-06-12 RX ORDER — FUROSEMIDE 40 MG
40 TABLET ORAL
Refills: 0 | Status: DISCONTINUED | OUTPATIENT
Start: 2020-06-12 | End: 2020-06-14

## 2020-06-12 RX ORDER — IPRATROPIUM BROMIDE 0.2 MG/ML
8 SOLUTION, NON-ORAL INHALATION ONCE
Refills: 0 | Status: COMPLETED | OUTPATIENT
Start: 2020-06-12 | End: 2020-06-12

## 2020-06-12 RX ORDER — FUROSEMIDE 40 MG
40 TABLET ORAL ONCE
Refills: 0 | Status: COMPLETED | OUTPATIENT
Start: 2020-06-12 | End: 2020-06-12

## 2020-06-12 RX ORDER — ALBUTEROL 90 UG/1
2 AEROSOL, METERED ORAL ONCE
Refills: 0 | Status: COMPLETED | OUTPATIENT
Start: 2020-06-12 | End: 2020-06-12

## 2020-06-12 RX ORDER — INSULIN DETEMIR 100/ML (3)
20 INSULIN PEN (ML) SUBCUTANEOUS AT BEDTIME
Refills: 0 | Status: DISCONTINUED | OUTPATIENT
Start: 2020-06-12 | End: 2020-06-13

## 2020-06-12 RX ORDER — BUDESONIDE AND FORMOTEROL FUMARATE DIHYDRATE 160; 4.5 UG/1; UG/1
2 AEROSOL RESPIRATORY (INHALATION)
Refills: 0 | Status: DISCONTINUED | OUTPATIENT
Start: 2020-06-12 | End: 2020-06-18

## 2020-06-12 RX ORDER — LOSARTAN POTASSIUM 100 MG/1
100 TABLET, FILM COATED ORAL DAILY
Refills: 0 | Status: DISCONTINUED | OUTPATIENT
Start: 2020-06-12 | End: 2020-06-13

## 2020-06-12 RX ORDER — INSULIN LISPRO 100/ML
VIAL (ML) SUBCUTANEOUS AT BEDTIME
Refills: 0 | Status: DISCONTINUED | OUTPATIENT
Start: 2020-06-12 | End: 2020-06-18

## 2020-06-12 RX ORDER — CLOPIDOGREL BISULFATE 75 MG/1
1 TABLET, FILM COATED ORAL
Qty: 0 | Refills: 0 | DISCHARGE

## 2020-06-12 RX ORDER — INSULIN DETEMIR 100/ML (3)
10 INSULIN PEN (ML) SUBCUTANEOUS
Qty: 0 | Refills: 0 | DISCHARGE

## 2020-06-12 RX ORDER — ALBUTEROL 90 UG/1
2 AEROSOL, METERED ORAL EVERY 6 HOURS
Refills: 0 | Status: DISCONTINUED | OUTPATIENT
Start: 2020-06-12 | End: 2020-06-13

## 2020-06-12 RX ORDER — ISOSORBIDE MONONITRATE 60 MG/1
120 TABLET, EXTENDED RELEASE ORAL DAILY
Refills: 0 | Status: DISCONTINUED | OUTPATIENT
Start: 2020-06-12 | End: 2020-06-18

## 2020-06-12 RX ORDER — GABAPENTIN 400 MG/1
1 CAPSULE ORAL
Qty: 0 | Refills: 0 | DISCHARGE

## 2020-06-12 RX ORDER — ENOXAPARIN SODIUM 100 MG/ML
40 INJECTION SUBCUTANEOUS DAILY
Refills: 0 | Status: DISCONTINUED | OUTPATIENT
Start: 2020-06-12 | End: 2020-06-18

## 2020-06-12 RX ORDER — ASPIRIN/CALCIUM CARB/MAGNESIUM 324 MG
81 TABLET ORAL DAILY
Refills: 0 | Status: DISCONTINUED | OUTPATIENT
Start: 2020-06-12 | End: 2020-06-18

## 2020-06-12 RX ORDER — DEXTROSE 50 % IN WATER 50 %
15 SYRINGE (ML) INTRAVENOUS ONCE
Refills: 0 | Status: DISCONTINUED | OUTPATIENT
Start: 2020-06-12 | End: 2020-06-18

## 2020-06-12 RX ORDER — NITROGLYCERIN 6.5 MG
0.4 CAPSULE, EXTENDED RELEASE ORAL
Refills: 0 | Status: DISCONTINUED | OUTPATIENT
Start: 2020-06-12 | End: 2020-06-18

## 2020-06-12 RX ORDER — SODIUM CHLORIDE 9 MG/ML
1000 INJECTION, SOLUTION INTRAVENOUS
Refills: 0 | Status: DISCONTINUED | OUTPATIENT
Start: 2020-06-12 | End: 2020-06-18

## 2020-06-12 RX ORDER — TAMSULOSIN HYDROCHLORIDE 0.4 MG/1
0.4 CAPSULE ORAL AT BEDTIME
Refills: 0 | Status: DISCONTINUED | OUTPATIENT
Start: 2020-06-12 | End: 2020-06-18

## 2020-06-12 RX ORDER — ACETAMINOPHEN 500 MG
650 TABLET ORAL EVERY 6 HOURS
Refills: 0 | Status: DISCONTINUED | OUTPATIENT
Start: 2020-06-12 | End: 2020-06-18

## 2020-06-12 RX ORDER — ATORVASTATIN CALCIUM 80 MG/1
80 TABLET, FILM COATED ORAL AT BEDTIME
Refills: 0 | Status: DISCONTINUED | OUTPATIENT
Start: 2020-06-12 | End: 2020-06-18

## 2020-06-12 RX ORDER — INSULIN LISPRO 100/ML
VIAL (ML) SUBCUTANEOUS
Refills: 0 | Status: DISCONTINUED | OUTPATIENT
Start: 2020-06-12 | End: 2020-06-15

## 2020-06-12 RX ORDER — FERROUS SULFATE 325(65) MG
0 TABLET ORAL
Qty: 0 | Refills: 0 | DISCHARGE

## 2020-06-12 RX ORDER — METOPROLOL TARTRATE 50 MG
1 TABLET ORAL
Qty: 0 | Refills: 0 | DISCHARGE

## 2020-06-12 RX ADMIN — ATORVASTATIN CALCIUM 80 MILLIGRAM(S): 80 TABLET, FILM COATED ORAL at 23:17

## 2020-06-12 RX ADMIN — Medication 40 MILLIGRAM(S): at 11:38

## 2020-06-12 RX ADMIN — LOSARTAN POTASSIUM 100 MILLIGRAM(S): 100 TABLET, FILM COATED ORAL at 22:56

## 2020-06-12 RX ADMIN — Medication 8 PUFF(S): at 12:11

## 2020-06-12 RX ADMIN — TAMSULOSIN HYDROCHLORIDE 0.4 MILLIGRAM(S): 0.4 CAPSULE ORAL at 23:17

## 2020-06-12 RX ADMIN — ISOSORBIDE MONONITRATE 120 MILLIGRAM(S): 60 TABLET, EXTENDED RELEASE ORAL at 23:57

## 2020-06-12 RX ADMIN — BUDESONIDE AND FORMOTEROL FUMARATE DIHYDRATE 2 PUFF(S): 160; 4.5 AEROSOL RESPIRATORY (INHALATION) at 22:56

## 2020-06-12 RX ADMIN — Medication 40 MILLIGRAM(S): at 20:46

## 2020-06-12 RX ADMIN — Medication 81 MILLIGRAM(S): at 22:55

## 2020-06-12 RX ADMIN — ALBUTEROL 2 PUFF(S): 90 AEROSOL, METERED ORAL at 11:38

## 2020-06-12 RX ADMIN — Medication 40 MILLIGRAM(S): at 15:57

## 2020-06-12 RX ADMIN — ENOXAPARIN SODIUM 40 MILLIGRAM(S): 100 INJECTION SUBCUTANEOUS at 22:56

## 2020-06-12 RX ADMIN — CLOPIDOGREL BISULFATE 75 MILLIGRAM(S): 75 TABLET, FILM COATED ORAL at 22:55

## 2020-06-12 RX ADMIN — Medication 0.4 MILLIGRAM(S): at 12:25

## 2020-06-12 NOTE — ED ADULT NURSE REASSESSMENT NOTE - NS ED NURSE REASSESS COMMENT FT1
Patient taken off bipap by attending physician.  O2 saturation is 97% on room air.  ICU physicians at bedside for evaluation.      Bloood drawn and sent to lab.  Patient placed on 02 NC for comfort and saturation is 100%.

## 2020-06-12 NOTE — CONSULT NOTE ADULT - SUBJECTIVE AND OBJECTIVE BOX
Patient is a 69y old  Male who presents with a chief complaint of acute on chronic heart failure (12 Jun 2020 14:56)      HPI: 69y M with PMH of HF, HTN, DM presenting with worsening SOB for several days, +cough.   He reports orthopnea  and worsening LE swelling. Pt is a poor historian, does not know which medications he takes. He denies fevers, chills, chest pain, diarrhea, sick contacts.  Seen by ICU, likely CHF, not a candidate for ICU admission.  Admit to cardio.    Pt seen and examined at bedside. comfortable on nasal canula.  Feeling a little better, but still with shortness of breath.   no cp no n/v/d.   no HA/ no dizziness  no sick contact  walk with cane at home.       PAST MEDICAL & SURGICAL HISTORY:  BPH (benign prostatic hyperplasia)  Lacunar infarction  Diabetes mellitus  HLD (hyperlipidemia)  HTN (hypertension)  Essential hypertension: HTN (hypertension)  Type 2 diabetes mellitus: Diabetes  S/P CABG (coronary artery bypass graft)      FAMILY HISTORY:  No pertinent family history in first degree relatives      SOCIAL HISTORY: hx of smoking, now quit. neg EtOH, neg ilicit drugs    Allergies: No Known Allergies    Intolerances: none          REVIEW OF SYSEMS:  General: no weakness, no fever/chills, no weight loss/gain  Skin/Breast: no rash, no jaundice  Ophthalmologic: no vision changes, no dry eyes   Respiratory and Thorax: + cough, no wheezing, no hemoptysis, + dyspnea  Cardiovascular: no chest pain, + shortness of breath, + orthopnea  Gastrointestinal: no n/v/d, no abdominal pain, no dysphagia   Genitourinary: no dysuria, no frequency, no nocturia, no hematuria  Musculoskeletal: no trauma, no sprain/strain, no myalgias, no arthralgias, no fracture  Neurological: no HA, no dizziness, no weakness, no numbness  Psychiatric: no depression, no SI/HI  Hematology/Lymphatics: no easy bruising  Endocrine: no heat or cold intolerance. no weight gain or loss  Allergic/Immunologic: no allergy or recent reaction       Vital Signs Last 24 Hrs  T(C): 37.2 (12 Jun 2020 15:00), Max: 37.3 (12 Jun 2020 11:39)  T(F): 98.9 (12 Jun 2020 15:00), Max: 99.1 (12 Jun 2020 11:39)  HR: 82 (12 Jun 2020 15:00) (78 - 90)  BP: 131/66 (12 Jun 2020 15:00) (122/69 - 150/56)  BP(mean): --  RR: 20 (12 Jun 2020 15:00) (16 - 30)  SpO2: 100% (12 Jun 2020 15:00) (95% - 100%)  I&O's Summary      PHYSICAL EXAM:  GENERAL: NAD, Comfortable, on NC, sitting up  HEAD:  Atraumatic, Normocephalic  EYES: EOMI, PERRLA, conjunctiva and sclera clear  NECK: Supple, +JVD  CHEST/LUNG: mild decrease breath sounds bilaterally, bibasilar crackles; No wheeze   HEART: Regular rate and rhythm; No murmurs, rubs, or gallops  ABDOMEN: Soft, Nontender, Nondistended; Bowel sounds present  Neuro: AAOx3, no focal deficit, 5/5 b/l extremities  EXTREMITIES:  2+ Peripheral Pulses, No clubbing, cyanosis, +2 edema  SKIN: No rashes or lesions    LABS:                        11.4   11.03 )-----------( 246      ( 12 Jun 2020 11:15 )             36.3     06-12    132<L>  |  96<L>  |  29<H>  ----------------------------<  204<H>  5.4<H>   |  18<L>  |  1.39<H>    Ca    9.3      12 Jun 2020 15:00  Phos  2.8     06-12  Mg     2.1     06-12    TPro  7.6  /  Alb  4.5  /  TBili  0.6  /  DBili  x   /  AST  34  /  ALT  33  /  AlkPhos  63  06-12    PT/INR - ( 12 Jun 2020 11:15 )   PT: 11.0 SEC;   INR: 0.96          PTT - ( 12 Jun 2020 11:15 )  PTT:28.3 SEC  CAPILLARY BLOOD GLUCOSE      POCT Blood Glucose.: 240 mg/dL (12 Jun 2020 10:48)            RADIOLOGY & ADDITIONAL TESTS:    Imaging Personally Reviewed:  [x] YES  [ ] NO    Consultant(s) Notes Reviewed:  [x] YES  [ ] NO      MEDICATIONS  (STANDING):  dextrose 5%. 1000 milliLiter(s) (50 mL/Hr) IV Continuous <Continuous>  dextrose 50% Injectable 12.5 Gram(s) IV Push once  dextrose 50% Injectable 25 Gram(s) IV Push once  dextrose 50% Injectable 25 Gram(s) IV Push once  enoxaparin Injectable 40 milliGRAM(s) SubCutaneous daily  furosemide   Injectable 40 milliGRAM(s) IV Push two times a day  insulin lispro (HumaLOG) corrective regimen sliding scale   SubCutaneous three times a day before meals  insulin lispro (HumaLOG) corrective regimen sliding scale   SubCutaneous at bedtime    MEDICATIONS  (PRN):  acetaminophen   Tablet .. 650 milliGRAM(s) Oral every 6 hours PRN Temp greater or equal to 38C (100.4F), Mild Pain (1 - 3), Moderate Pain (4 - 6)  dextrose 40% Gel 15 Gram(s) Oral once PRN Blood Glucose LESS THAN 70 milliGRAM(s)/deciliter  glucagon  Injectable 1 milliGRAM(s) IntraMuscular once PRN Glucose LESS THAN 70 milligrams/deciliter  nitroglycerin     SubLingual 0.4 milliGRAM(s) SubLingual every 5 minutes PRN Chest Pain      Care Discussed with Consultants/Other Providers [x] YES  [ ] NO    HEALTH ISSUES - PROBLEM Dx:

## 2020-06-12 NOTE — CONSULT NOTE ADULT - ASSESSMENT
#Dyspnea  likley in the setting of acute on chronic heart failure. Pt appears volume overloaded and has elevated BNP  trend Troponins  s/p Lasix 40 x1, monitor Is and Os, daily weights  diurese as needed  check TTE  avoid QT prolonging agents as QTc ~530 ms  monitor on tele    #Abd pain  obtain abd US to check for ascites    #Sepsis  check blood cx, U/A, CXR, COVID PCR    #ELMER  likely prerenal in the setting of sepsis  monitor SCr  avoid nephrotoxic agents    #hyperglycemia  check A1C, start ISS    Pt is not a MICU candidate at this time, please reconsult as needed. #Dyspnea  likley in the setting of acute on chronic heart failure. Pt appears volume overloaded and has elevated BNP  trend Troponins  s/p Lasix 40 x1, monitor strict Is and Os, daily weights  diurese as needed  check TTE  avoid QT prolonging agents as QTc ~530 ms  monitor on tele  Bipap prn    #Abd pain  obtain abd US to check for ascites    #Sepsis  Pt with leukocytosis, elevated lactate  Lactate may be elevated due to poor perfusion  check blood cx, U/A, CXR, COVID PCR  repeat VBG with lactate    #ELMER  likely prerenal in the setting of sepsis  monitor SCr  avoid nephrotoxic agents    #hyperglycemia  check A1C, start ISS    Pt is not a MICU candidate at this time, please reconsult as needed. #Dyspnea  likley in the setting of acute on chronic heart failure. Pt appears volume overloaded and has elevated BNP  trend Troponins  s/p Lasix 40 x1, monitor strict Is and Os, daily weights  diurese as needed  check TTE  avoid QT prolonging agents as QTc ~530 ms  monitor on tele  Bipap prn    #Abd pain  obtain abd US to check for ascites    #Sepsis  Pt with leukocytosis, elevated lactate  Lactate may be elevated due to poor perfusion and use of duonebs  check blood cx, U/A, CXR, COVID PCR  repeat VBG with lactate    #ELMER  likely prerenal in the setting of sepsis  monitor SCr  avoid nephrotoxic agents    #hyperglycemia  check A1C, start ISS    Pt is not a MICU candidate at this time, please reconsult as needed.

## 2020-06-12 NOTE — ED PROVIDER NOTE - ATTENDING CONTRIBUTION TO CARE
agree with above  additionally, the son says the patient has a history of heart failure and the son says this is usually how he looks during chf exacerbations. He also has orthopnea at home. Onset was last night, and worsened this am despite increasing diuretic.     patient on presentation was tachypneic, dyspneic, [id number], had crackles bilaterally    ekg non ischemic, cxr++f luid overload   he responded well to bipap and diuretics, will require admission for chf exacerbation

## 2020-06-12 NOTE — CONSULT NOTE ADULT - ASSESSMENT
69 y/o male with PMHx of HTN, T2DM, CAD s/p CABG 09/2014 & GUILLE to ostial (09/2015), % with patent grafts (with patent LIMA to LAD and patent SVG to diag), recent left Heart cath with nonobstructive disease in January 2019, presenting with worsening SOB for several days, +cough. He reports orthopnea  and worsening LE swelling. Pt is a poor historian, does not know which medications he takes.     # Dyspnea likely acute on chronic heart failure in the setting of volume overload with bibasilar crackles  s/p Lasix 40 x1 in ED  diurese per cardiology  TTE pending, c/w tele monitoring   weaned off bipap, now on nasal canula.  stricts I/O, daily weights  no sick contact, doubt COVID19, pending nasal swab.  CXR no evidence of PNA. monitor off abx.   no wheezing on exam to suggest COPD exacerbation.   duoneb PRN.   mild leukocytosis, Lactate may be elevated due to poor perfusion. will trend.   f/u blood cx, U/A, CXR, COVID PCR    # hx of CAD s/p CABG  cardio eval, resume home meds: Asa/Plavix/statin/betablocker  pending TTE  trend trops, likely demand ischema  recent left Heart cath with nonobstructive disease     # Acute kidney failure  likely in the setting of cardio renal etiology.   r/o obstructive etiology. check post void bladder scan.   trend Cr  monitor urine outpt  avoid nephrotoxic agents    #Diabetes:   check A1C, start ISS    Med reviewed from recent admission from earlier 2019. Pt doesn't remember his meds.     DVT ppx    - Dr. BRODERICK Htet ([employer])  - [phone] 69 y/o male with PMHx of HTN, T2DM, CAD s/p CABG 09/2014 & GUILLE to ostial (09/2015), % with patent grafts (with patent LIMA to LAD and patent SVG to diag), recent left Heart cath with nonobstructive disease in January 2019, presenting with worsening SOB for several days, +cough. He reports orthopnea  and worsening LE swelling. Pt is a poor historian, does not know which medications he takes.     # Dyspnea likely acute on chronic heart failure in the setting of volume overload with bibasilar crackles  s/p Lasix 40 x1 in ED  diurese per cardiology  TTE pending, c/w tele monitoring   weaned off bipap, now on nasal canula.  stricts I/O, daily weights  no sick contact, doubt COVID19, pending nasal swab.  CXR no evidence of PNA. monitor off abx.   no wheezing on exam to suggest COPD exacerbation. c/w home meds: inhalers. 	  duoneb PRN.   mild leukocytosis, Lactate may be elevated due to poor perfusion. will trend.   f/u blood cx, U/A, CXR, COVID PCR    # hx of CAD s/p CABG  cardio eval, resume home meds: Asa/Plavix/statin/betablocker  pending TTE  trend trops, likely demand ischema  recent left Heart cath with nonobstructive disease     # Acute kidney failure  likely in the setting of cardio renal etiology.   r/o obstructive etiology. check post void bladder scan.   trend Cr  monitor urine outpt  avoid nephrotoxic agents    #Diabetes:   check A1C, start ISS    Med reviewed from recent admission from earlier 2019. Pt doesn't remember his meds.     DVT ppx    - Dr. BRODERICK Htet ([place])  - [phone]

## 2020-06-12 NOTE — ED ADULT TRIAGE NOTE - CHIEF COMPLAINT QUOTE
Pt c/o increasing sob since last night.  Denies chest pain, cough, dizziness, chills.  Breathing labored at triage.  Pt with hx of type 2 DM.  Pt coughing at tiage

## 2020-06-12 NOTE — ED ADULT NURSE REASSESSMENT NOTE - NS ED NURSE REASSESS COMMENT FT1
Received patient from [facility].  Patient c/o of abdominal pain and SOB. Patient noted to be on NC 2L and o2 saturation is 99%.  Patient states his breathing is better but he's still having abdominal pain.  Bedside monitoring in progress. Covid swab sent.

## 2020-06-12 NOTE — ED ADULT NURSE NOTE - OBJECTIVE STATEMENT
Received pt into spot #trA via wheelchair. Pt A/o x 3 calm cooperative, appears SOB, breathing labored and tachypneic in 30s. Pt appears diaphoretic. Hx Dm2 , FS 200s in triage. Placed on tele monitor SR HR 90s. O2 sat RA 95%. Placed on 2L NC for comfort. Dr. Lainez and Dr. Escobar in to eval pt on arrival. Abdomen appears distended and firm. Pt c/o mid abdominal pain since last night. Normal BM this morning for pt. Denies any urinary symptoms. BLE edema noted. Pt states swelling noted since last night since pt was sitting up all night in chair due to SOB. Denies CP, dizziness, nausea, fever/chills. Peripheral line #20 angio placed to LAC. Lasix IVP, Solu medrol IVP , albuterol 2 puffs inhalation given as ordered. COVID swab obtained. Pt moved to spot #6 for possible BIPAP administration. Report given to Joe Celeste RN.

## 2020-06-12 NOTE — ED PROVIDER NOTE - PROGRESS NOTE DETAILS
Jennifer Lainez MD: pt very comfortable appearing after bipap, on cell phone, respiratory status improved. BP improved with sublingual nitro and lasix. pt endorsed to tele doc Soren Kothari for chf exacerbation

## 2020-06-12 NOTE — H&P ADULT - NSHPPHYSICALEXAM_GEN_ALL_CORE
Vital Signs Last 24 Hrs  T(C): 37.2 (12 Jun 2020 15:00), Max: 37.3 (12 Jun 2020 11:39)  T(F): 98.9 (12 Jun 2020 15:00), Max: 99.1 (12 Jun 2020 11:39)  HR: 82 (12 Jun 2020 15:00) (78 - 90)  BP: 131/66 (12 Jun 2020 15:00) (122/69 - 150/56)  BP(mean): --  RR: 20 (12 Jun 2020 15:00) (16 - 30)  SpO2: 100% (12 Jun 2020 15:00) (95% - 100%)    EKG: NSR @ 84, 1st deg avb, T inv I, AVL, ST dep V4-6; QTC: 527

## 2020-06-12 NOTE — ED PROVIDER NOTE - CLINICAL SUMMARY MEDICAL DECISION MAKING FREE TEXT BOX
70yo M h/o HTN, COPD, DM, CHF, CABG p/w shortness of breath, abdominal pain that started yesterday. pt in respiratory distress, crackles on lung exam, RLE swelling, concern for chf exacerbation, COPD exacerbation, ACS, PE given leg swelling. given pt in respiratory distress will put on bipap, most likely COPD exacerbation v chf exacerbation. will give steroids, nebs, lasix, bipap, admit

## 2020-06-12 NOTE — ED PROVIDER NOTE - CRITICAL CARE PROVIDED
interpretation of diagnostic studies/additional history taking/consultation with other physicians/direct patient care (not related to procedure)/documentation/consult w/ pt's family directly relating to pts condition

## 2020-06-12 NOTE — H&P ADULT - ATTENDING COMMENTS
Patient seen and examined.  Agree with above pa note.  68 y/o male, with a PmHx of HTN, COPD, CHF, HLD, DM, CABG, presented to the [facility] ED with SOB. Admitted to telemetry for acute on chronic chf exacerbation.  clinically improved post BIPAP, iv lasix   continue lasix iv bid  med/pulmo eval   check echo   eventual ischemic eval once euvolemic   cont dap  r/o covid, check rvp

## 2020-06-12 NOTE — ED ADULT NURSE NOTE - NSTOBACCO TYPE_GEN_A_CORE_RD
Quality 431: Preventive Care And Screening: Unhealthy Alcohol Use - Screening: Patient screened for unhealthy alcohol use using a single question and scores less than 2 times per year Quality 226: Preventive Care And Screening: Tobacco Use: Screening And Cessation Intervention: Patient screened for tobacco and never smoked Quality 111:Pneumonia Vaccination Status For Older Adults: Pneumococcal Vaccination Previously Received Detail Level: Detailed Quality 110: Preventive Care And Screening: Influenza Immunization: Influenza Immunization previously received during influenza season Quality 47: Advance Care Plan: Advance Care Planning discussed and documented; advance care plan or surrogate decision maker documented in the medical record. Cigarettes

## 2020-06-12 NOTE — H&P ADULT - NEGATIVE OPHTHALMOLOGIC SYMPTOMS
no blurred vision R/no loss of vision R/no photophobia/no loss of vision L/no diplopia/no blurred vision L

## 2020-06-12 NOTE — ED PROVIDER NOTE - CRITICAL CARE ATTESTATION
I have personally provided the amount of critical care time documented below concurrently with the resident/fellow.  This time excludes time spent on separate procedures and time spent teaching. I have reviewed the resident’s/fellow’s documentation and I agree with the assessment and plan of care
hair removal not indicated

## 2020-06-12 NOTE — H&P ADULT - PROBLEM SELECTOR PLAN 1
ekg/telemetry, ce x 2, mg, tsh, probnp, daily wts, fluid restriction, strict I & O's, started on Lasix 40 iv bid, echo, consider ischemic evaluation

## 2020-06-12 NOTE — H&P ADULT - ASSESSMENT
70 y/o male, with a PmHx of HTN, COPD, CHF, HLD, DM, CABG, presented to the [facility] ED with SOB. Admitted to telemetry for acute on chronic chf exacerbation.

## 2020-06-12 NOTE — ED PROVIDER NOTE - PHYSICAL EXAMINATION
Vitals: tachypneic, htn  Gen: tachypneic, respiratory distress, using abd muscles to breath  Head: NCAT  ENT: sclerae white, anicterus, moist mucous membranes. No exudates  CV: RRR. Audible S1 and S2. No murmurs, rubs, gallops, S3, nor S4  Pulm: crackles throughout b/l  Abd: soft, normoactive BS x4, NTN  Musculoskeletal: +RLE swelling with pitting edema  Skin: no lesions or scars noted  Neurologic: AAOx3  : no CVA tenderness

## 2020-06-12 NOTE — ED PROVIDER NOTE - OBJECTIVE STATEMENT
68yo M h/o HTN, COPD, DM, CHF, CABG p/w shortness of breath, abdominal pain that started yesterday. + chest pressure. + epigastric pain, does not radiate. denies f/c, cough, n/v/d. unable to answer any more questions givne pt in respiratory distress

## 2020-06-12 NOTE — H&P ADULT - NSICDXPASTMEDICALHX_GEN_ALL_CORE_FT
Called pt- L/M informing pt that she should report to L & D for IOL tomorrow at 2000, as previously discussed  Advised to call with any further questions/concerns  PAST MEDICAL HISTORY:  BPH (benign prostatic hyperplasia)     CHF (congestive heart failure)     Chronic obstructive pulmonary disease, unspecified COPD type     Diabetes mellitus     HLD (hyperlipidemia)     HTN (hypertension)     Lacunar infarction

## 2020-06-12 NOTE — ED ADULT NURSE REASSESSMENT NOTE - NS ED NURSE REASSESS COMMENT FT1
Patient placed on bipap by respiratory therapist on ordered settings. Patient  tolerated well. Monitored continuously.

## 2020-06-12 NOTE — CONSULT NOTE ADULT - SUBJECTIVE AND OBJECTIVE BOX
CHIEF COMPLAINT:    HPI: This is a 69y M with PMH of HF, HTN, DM presenting with worsening SOB for several days, +cough. He reports orthopnea  and worsening LE swelling.     PAST MEDICAL & SURGICAL HISTORY:  BPH (benign prostatic hyperplasia)  Lacunar infarction  Diabetes mellitus  HLD (hyperlipidemia)  HTN (hypertension)  Essential hypertension: HTN (hypertension)  Type 2 diabetes mellitus: Diabetes  S/P CABG (coronary artery bypass graft)      FAMILY HISTORY:  No pertinent family history in first degree relatives      SOCIAL HISTORY:  Smoking: former smoker  EtOH Use: denies  Lives with his son, ambulates independently. walks 3-4 blocks    Allergies    No Known Allergies    Intolerances    HOME MEDICATIONS:    REVIEW OF SYSTEMS:  CONSTITUTIONAL: No weakness, fevers or chills  EYES/ENT: No visual changes;  No vertigo or throat pain   NECK: No pain or stiffness  RESPIRATORY: No cough, wheezing, hemoptysis; +shortness of breath, +ALBERT  CARDIOVASCULAR: No chest pain or palpitations  GASTROINTESTINAL: No abdominal or epigastric pain. No nausea, vomiting, or hematemesis; No diarrhea or constipation. No melena or hematochezia.  GENITOURINARY: No dysuria, frequency or hematuria  NEUROLOGICAL: No numbness or weakness  SKIN: No itching, burning, rashes, or lesions  All other review of systems is negative unless indicated above.    OBJECTIVE:  ICU Vital Signs Last 24 Hrs  T(C): 37.3 (12 Jun 2020 11:39), Max: 37.3 (12 Jun 2020 11:39)  T(F): 99.1 (12 Jun 2020 11:39), Max: 99.1 (12 Jun 2020 11:39)  HR: 85 (12 Jun 2020 12:45) (78 - 90)  BP: 126/61 (12 Jun 2020 12:45) (122/69 - 150/56)  RR: 22 (12 Jun 2020 12:45) (16 - 30)  SpO2: 100% (12 Jun 2020 12:45) (95% - 100%)        CAPILLARY BLOOD GLUCOSE      POCT Blood Glucose.: 240 mg/dL (12 Jun 2020 10:48)    PHYSICAL EXAM:   GENERAL: NAD, well-developed  HEAD:  Atraumatic, Normocephalic  EYES: EOMI, PERRLA, conjunctiva and sclera clear  NECK: Supple, No JVD  CHEST/LUNG: Clear to auscultation bilaterally; No wheeze  HEART: Regular rate and rhythm; No murmurs, rubs, or gallops  ABDOMEN: Soft, Nontender, Nondistended; Bowel sounds present, abdominal obesity  EXTREMITIES:  2+ Peripheral Pulses, No clubbing, cyanosis, trace pitting edema  PSYCH: AAOx3  NEUROLOGY: non-focal, GROVES  SKIN: No rashes or lesions    HOSPITAL MEDICATIONS:  MEDICATIONS  (STANDING):    MEDICATIONS  (PRN):  nitroglycerin     SubLingual 0.4 milliGRAM(s) SubLingual every 5 minutes PRN Chest Pain      LABS:                        11.4   11.03 )-----------( 246      ( 12 Jun 2020 11:15 )             36.3     06-12    132<L>  |  96<L>  |  28<H>  ----------------------------<  260<H>  5.4<H>   |  19<L>  |  1.31<H>    Ca    9.2      12 Jun 2020 11:15    TPro  7.6  /  Alb  4.5  /  TBili  0.6  /  DBili  x   /  AST  34  /  ALT  33  /  AlkPhos  63  06-12    PT/INR - ( 12 Jun 2020 11:15 )   PT: 11.0 SEC;   INR: 0.96          PTT - ( 12 Jun 2020 11:15 )  PTT:28.3 SEC      Venous Blood Gas:  06-12 @ 11:45  7.27/48/58/20/85.4  VBG Lactate: 3.5      MICROBIOLOGY:     RADIOLOGY:  [ ] Reviewed and interpreted by me    EKG: NSR, QTc 529ms CHIEF COMPLAINT:    HPI: This is a 69y M with PMH of HF, HTN, DM presenting with worsening SOB for several days, +cough. He reports orthopnea  and worsening LE swelling. Pt is a poor historian, does not know which medications he takes. He denies fevers, chills, chest pain, diarrhea, sick contacts.    PAST MEDICAL & SURGICAL HISTORY:  BPH (benign prostatic hyperplasia)  Lacunar infarction  Diabetes mellitus  HLD (hyperlipidemia)  HTN (hypertension)  Essential hypertension: HTN (hypertension)  Type 2 diabetes mellitus: Diabetes  S/P CABG (coronary artery bypass graft)      FAMILY HISTORY:  No pertinent family history in first degree relatives      SOCIAL HISTORY:  Smoking: former smoker  EtOH Use: denies  Lives with his son, ambulates independently. walks 3-4 blocks    Allergies    No Known Allergies    Intolerances    HOME MEDICATIONS:    REVIEW OF SYSTEMS:  CONSTITUTIONAL: No weakness, fevers or chills  EYES/ENT: No visual changes;  No vertigo or throat pain   NECK: No pain or stiffness  RESPIRATORY: No cough, wheezing, hemoptysis; +shortness of breath, +ALBERT  CARDIOVASCULAR: No chest pain or palpitations  GASTROINTESTINAL: No abdominal or epigastric pain. No nausea, vomiting, or hematemesis; No diarrhea or constipation. No melena or hematochezia.  GENITOURINARY: No dysuria, frequency or hematuria  NEUROLOGICAL: No numbness or weakness  SKIN: No itching, burning, rashes, or lesions  All other review of systems is negative unless indicated above.    OBJECTIVE:  ICU Vital Signs Last 24 Hrs  T(C): 37.3 (12 Jun 2020 11:39), Max: 37.3 (12 Jun 2020 11:39)  T(F): 99.1 (12 Jun 2020 11:39), Max: 99.1 (12 Jun 2020 11:39)  HR: 85 (12 Jun 2020 12:45) (78 - 90)  BP: 126/61 (12 Jun 2020 12:45) (122/69 - 150/56)  RR: 22 (12 Jun 2020 12:45) (16 - 30)  SpO2: 100% (12 Jun 2020 12:45) (95% - 100%)        CAPILLARY BLOOD GLUCOSE      POCT Blood Glucose.: 240 mg/dL (12 Jun 2020 10:48)    PHYSICAL EXAM:   GENERAL: NAD, well-developed  HEAD:  Atraumatic, Normocephalic  EYES: EOMI, PERRLA, conjunctiva and sclera clear  NECK: Supple, No JVD  CHEST/LUNG: Clear to auscultation bilaterally; No wheeze  HEART: Regular rate and rhythm; No murmurs, rubs, or gallops  ABDOMEN: Soft, Nontender, Nondistended; Bowel sounds present, abdominal obesity  EXTREMITIES:  2+ Peripheral Pulses, No clubbing, cyanosis, trace pitting edema  PSYCH: AAOx3  NEUROLOGY: non-focal, GROVES  SKIN: No rashes or lesions    HOSPITAL MEDICATIONS:  MEDICATIONS  (STANDING):    MEDICATIONS  (PRN):  nitroglycerin     SubLingual 0.4 milliGRAM(s) SubLingual every 5 minutes PRN Chest Pain      LABS:                        11.4   11.03 )-----------( 246      ( 12 Jun 2020 11:15 )             36.3     06-12    132<L>  |  96<L>  |  28<H>  ----------------------------<  260<H>  5.4<H>   |  19<L>  |  1.31<H>    Ca    9.2      12 Jun 2020 11:15    TPro  7.6  /  Alb  4.5  /  TBili  0.6  /  DBili  x   /  AST  34  /  ALT  33  /  AlkPhos  63  06-12    PT/INR - ( 12 Jun 2020 11:15 )   PT: 11.0 SEC;   INR: 0.96          PTT - ( 12 Jun 2020 11:15 )  PTT:28.3 SEC      Venous Blood Gas:  06-12 @ 11:45  7.27/48/58/20/85.4  VBG Lactate: 3.5      MICROBIOLOGY:     RADIOLOGY:  [ ] Reviewed and interpreted by me    EKG: NSR, QTc 529ms

## 2020-06-12 NOTE — H&P ADULT - HISTORY OF PRESENT ILLNESS
68 y/o male, with a PmHx of HTN, COPD, CHF, HLD, DM, CABG, presented to the [facility] ED with SOB.. Pt states at around 1900hrs last night, while at home, he had a sudden onset of SOB. He states he was just sitting at home and it came on suddenly and it was getting any better so he came to the hospital today for an evaluation. He denies any fever, chills, HA, blurred vision, chest pain, abd pain, n/v, sick contacts. He states he has been having some dizziness. In the [facility] ED, he was found to be hypoxic and was started on BIPAP but was later weaned off. Currently, he appears comfortable at this time saturating at 99% on 2 LPM nasal cannula. He is now being admitted to telemetry for acute on chronic CHF exacerbation.

## 2020-06-13 LAB
ALBUMIN SERPL ELPH-MCNC: 4.3 G/DL — SIGNIFICANT CHANGE UP (ref 3.3–5)
ALP SERPL-CCNC: 64 U/L — SIGNIFICANT CHANGE UP (ref 40–120)
ALT FLD-CCNC: 27 U/L — SIGNIFICANT CHANGE UP (ref 4–41)
ANION GAP SERPL CALC-SCNC: 16 MMO/L — HIGH (ref 7–14)
ANION GAP SERPL CALC-SCNC: 17 MMO/L — HIGH (ref 7–14)
APPEARANCE UR: CLEAR — SIGNIFICANT CHANGE UP
AST SERPL-CCNC: 22 U/L — SIGNIFICANT CHANGE UP (ref 4–40)
B PERT DNA SPEC QL NAA+PROBE: NOT DETECTED — SIGNIFICANT CHANGE UP
B-OH-BUTYR SERPL-SCNC: 0.4 MMOL/L — SIGNIFICANT CHANGE UP (ref 0–0.4)
BASE EXCESS BLDV CALC-SCNC: -1 MMOL/L — SIGNIFICANT CHANGE UP
BASOPHILS # BLD AUTO: 0.01 K/UL — SIGNIFICANT CHANGE UP (ref 0–0.2)
BASOPHILS NFR BLD AUTO: 0.1 % — SIGNIFICANT CHANGE UP (ref 0–2)
BILIRUB SERPL-MCNC: 0.4 MG/DL — SIGNIFICANT CHANGE UP (ref 0.2–1.2)
BILIRUB UR-MCNC: NEGATIVE — SIGNIFICANT CHANGE UP
BLOOD UR QL VISUAL: SIGNIFICANT CHANGE UP
BUN SERPL-MCNC: 32 MG/DL — HIGH (ref 7–23)
BUN SERPL-MCNC: 34 MG/DL — HIGH (ref 7–23)
C PNEUM DNA SPEC QL NAA+PROBE: NOT DETECTED — SIGNIFICANT CHANGE UP
CALCIUM SERPL-MCNC: 9.5 MG/DL — SIGNIFICANT CHANGE UP (ref 8.4–10.5)
CALCIUM SERPL-MCNC: 9.6 MG/DL — SIGNIFICANT CHANGE UP (ref 8.4–10.5)
CHLORIDE SERPL-SCNC: 92 MMOL/L — LOW (ref 98–107)
CHLORIDE SERPL-SCNC: 95 MMOL/L — LOW (ref 98–107)
CHOLEST SERPL-MCNC: 115 MG/DL — LOW (ref 120–199)
CO2 SERPL-SCNC: 22 MMOL/L — SIGNIFICANT CHANGE UP (ref 22–31)
CO2 SERPL-SCNC: 24 MMOL/L — SIGNIFICANT CHANGE UP (ref 22–31)
COLOR SPEC: COLORLESS — SIGNIFICANT CHANGE UP
CREAT SERPL-MCNC: 1.37 MG/DL — HIGH (ref 0.5–1.3)
CREAT SERPL-MCNC: 1.48 MG/DL — HIGH (ref 0.5–1.3)
D DIMER BLD IA.RAPID-MCNC: < 150 NG/ML — SIGNIFICANT CHANGE UP
EOSINOPHIL # BLD AUTO: 0 K/UL — SIGNIFICANT CHANGE UP (ref 0–0.5)
EOSINOPHIL NFR BLD AUTO: 0 % — SIGNIFICANT CHANGE UP (ref 0–6)
FLUAV H1 2009 PAND RNA SPEC QL NAA+PROBE: NOT DETECTED — SIGNIFICANT CHANGE UP
FLUAV H1 RNA SPEC QL NAA+PROBE: NOT DETECTED — SIGNIFICANT CHANGE UP
FLUAV H3 RNA SPEC QL NAA+PROBE: NOT DETECTED — SIGNIFICANT CHANGE UP
FLUAV SUBTYP SPEC NAA+PROBE: NOT DETECTED — SIGNIFICANT CHANGE UP
FLUBV RNA SPEC QL NAA+PROBE: NOT DETECTED — SIGNIFICANT CHANGE UP
GAS PNL BLDV: 132 MMOL/L — LOW (ref 136–146)
GLUCOSE BLDV-MCNC: 529 MG/DL — CRITICAL HIGH (ref 70–99)
GLUCOSE SERPL-MCNC: 191 MG/DL — HIGH (ref 70–99)
GLUCOSE SERPL-MCNC: 551 MG/DL — CRITICAL HIGH (ref 70–99)
GLUCOSE UR-MCNC: 500 — HIGH
HADV DNA SPEC QL NAA+PROBE: NOT DETECTED — SIGNIFICANT CHANGE UP
HBA1C BLD-MCNC: 7.8 % — HIGH (ref 4–5.6)
HBA1C BLD-MCNC: 7.9 % — HIGH (ref 4–5.6)
HCO3 BLDV-SCNC: 23 MMOL/L — SIGNIFICANT CHANGE UP (ref 20–27)
HCOV PNL SPEC NAA+PROBE: SIGNIFICANT CHANGE UP
HCT VFR BLD CALC: 34.8 % — LOW (ref 39–50)
HCT VFR BLDV CALC: 35.5 % — LOW (ref 39–51)
HCV AB S/CO SERPL IA: 0.15 S/CO — SIGNIFICANT CHANGE UP (ref 0–0.99)
HCV AB SERPL-IMP: SIGNIFICANT CHANGE UP
HDLC SERPL-MCNC: 60 MG/DL — HIGH (ref 35–55)
HGB BLD-MCNC: 11.3 G/DL — LOW (ref 13–17)
HGB BLDV-MCNC: 11.5 G/DL — LOW (ref 13–17)
HMPV RNA SPEC QL NAA+PROBE: NOT DETECTED — SIGNIFICANT CHANGE UP
HPIV1 RNA SPEC QL NAA+PROBE: NOT DETECTED — SIGNIFICANT CHANGE UP
HPIV2 RNA SPEC QL NAA+PROBE: NOT DETECTED — SIGNIFICANT CHANGE UP
HPIV3 RNA SPEC QL NAA+PROBE: NOT DETECTED — SIGNIFICANT CHANGE UP
HPIV4 RNA SPEC QL NAA+PROBE: NOT DETECTED — SIGNIFICANT CHANGE UP
IMM GRANULOCYTES NFR BLD AUTO: 0.4 % — SIGNIFICANT CHANGE UP (ref 0–1.5)
KETONES UR-MCNC: NEGATIVE — SIGNIFICANT CHANGE UP
LEUKOCYTE ESTERASE UR-ACNC: NEGATIVE — SIGNIFICANT CHANGE UP
LIPID PNL WITH DIRECT LDL SERPL: 50 MG/DL — SIGNIFICANT CHANGE UP
LYMPHOCYTES # BLD AUTO: 0.71 K/UL — LOW (ref 1–3.3)
LYMPHOCYTES # BLD AUTO: 7.4 % — LOW (ref 13–44)
MCHC RBC-ENTMCNC: 28.2 PG — SIGNIFICANT CHANGE UP (ref 27–34)
MCHC RBC-ENTMCNC: 32.5 % — SIGNIFICANT CHANGE UP (ref 32–36)
MCV RBC AUTO: 86.8 FL — SIGNIFICANT CHANGE UP (ref 80–100)
MONOCYTES # BLD AUTO: 1.14 K/UL — HIGH (ref 0–0.9)
MONOCYTES NFR BLD AUTO: 11.9 % — SIGNIFICANT CHANGE UP (ref 2–14)
NEUTROPHILS # BLD AUTO: 7.7 K/UL — HIGH (ref 1.8–7.4)
NEUTROPHILS NFR BLD AUTO: 80.2 % — HIGH (ref 43–77)
NITRITE UR-MCNC: NEGATIVE — SIGNIFICANT CHANGE UP
NRBC # FLD: 0 K/UL — SIGNIFICANT CHANGE UP (ref 0–0)
PCO2 BLDV: 41 MMHG — SIGNIFICANT CHANGE UP (ref 41–51)
PH BLDV: 7.38 PH — SIGNIFICANT CHANGE UP (ref 7.32–7.43)
PH UR: 6 — SIGNIFICANT CHANGE UP (ref 5–8)
PLATELET # BLD AUTO: 225 K/UL — SIGNIFICANT CHANGE UP (ref 150–400)
PMV BLD: 12.2 FL — SIGNIFICANT CHANGE UP (ref 7–13)
PO2 BLDV: 55 MMHG — HIGH (ref 35–40)
POTASSIUM BLDV-SCNC: 4.3 MMOL/L — SIGNIFICANT CHANGE UP (ref 3.4–4.5)
POTASSIUM SERPL-MCNC: 3.9 MMOL/L — SIGNIFICANT CHANGE UP (ref 3.5–5.3)
POTASSIUM SERPL-MCNC: 4.3 MMOL/L — SIGNIFICANT CHANGE UP (ref 3.5–5.3)
POTASSIUM SERPL-SCNC: 3.9 MMOL/L — SIGNIFICANT CHANGE UP (ref 3.5–5.3)
POTASSIUM SERPL-SCNC: 4.3 MMOL/L — SIGNIFICANT CHANGE UP (ref 3.5–5.3)
PROT SERPL-MCNC: 6.8 G/DL — SIGNIFICANT CHANGE UP (ref 6–8.3)
PROT UR-MCNC: NEGATIVE — SIGNIFICANT CHANGE UP
RBC # BLD: 4.01 M/UL — LOW (ref 4.2–5.8)
RBC # FLD: 14.9 % — HIGH (ref 10.3–14.5)
RSV RNA SPEC QL NAA+PROBE: NOT DETECTED — SIGNIFICANT CHANGE UP
RV+EV RNA SPEC QL NAA+PROBE: NOT DETECTED — SIGNIFICANT CHANGE UP
SAO2 % BLDV: 87.2 % — HIGH (ref 60–85)
SARS-COV-2 RNA SPEC QL NAA+PROBE: SIGNIFICANT CHANGE UP
SODIUM SERPL-SCNC: 131 MMOL/L — LOW (ref 135–145)
SODIUM SERPL-SCNC: 135 MMOL/L — SIGNIFICANT CHANGE UP (ref 135–145)
SP GR SPEC: 1.01 — SIGNIFICANT CHANGE UP (ref 1–1.04)
TRIGL SERPL-MCNC: 47 MG/DL — SIGNIFICANT CHANGE UP (ref 10–149)
UROBILINOGEN FLD QL: NORMAL — SIGNIFICANT CHANGE UP
WBC # BLD: 9.6 K/UL — SIGNIFICANT CHANGE UP (ref 3.8–10.5)
WBC # FLD AUTO: 9.6 K/UL — SIGNIFICANT CHANGE UP (ref 3.8–10.5)

## 2020-06-13 PROCEDURE — ZZZZZ: CPT

## 2020-06-13 PROCEDURE — 99255 IP/OBS CONSLTJ NEW/EST HI 80: CPT

## 2020-06-13 PROCEDURE — 93971 EXTREMITY STUDY: CPT | Mod: 26,LT

## 2020-06-13 PROCEDURE — 93306 TTE W/DOPPLER COMPLETE: CPT | Mod: 26

## 2020-06-13 RX ORDER — SENNA PLUS 8.6 MG/1
2 TABLET ORAL AT BEDTIME
Refills: 0 | Status: DISCONTINUED | OUTPATIENT
Start: 2020-06-13 | End: 2020-06-18

## 2020-06-13 RX ORDER — INSULIN LISPRO 100/ML
4 VIAL (ML) SUBCUTANEOUS ONCE
Refills: 0 | Status: COMPLETED | OUTPATIENT
Start: 2020-06-13 | End: 2020-06-13

## 2020-06-13 RX ORDER — IPRATROPIUM/ALBUTEROL SULFATE 18-103MCG
3 AEROSOL WITH ADAPTER (GRAM) INHALATION EVERY 6 HOURS
Refills: 0 | Status: DISCONTINUED | OUTPATIENT
Start: 2020-06-13 | End: 2020-06-18

## 2020-06-13 RX ORDER — INSULIN DETEMIR 100/ML (3)
15 INSULIN PEN (ML) SUBCUTANEOUS AT BEDTIME
Refills: 0 | Status: DISCONTINUED | OUTPATIENT
Start: 2020-06-13 | End: 2020-06-14

## 2020-06-13 RX ADMIN — Medication 81 MILLIGRAM(S): at 12:46

## 2020-06-13 RX ADMIN — Medication 1: at 12:45

## 2020-06-13 RX ADMIN — Medication 4 UNIT(S): at 01:29

## 2020-06-13 RX ADMIN — Medication 1 DROP(S): at 17:12

## 2020-06-13 RX ADMIN — TAMSULOSIN HYDROCHLORIDE 0.4 MILLIGRAM(S): 0.4 CAPSULE ORAL at 21:58

## 2020-06-13 RX ADMIN — BUDESONIDE AND FORMOTEROL FUMARATE DIHYDRATE 2 PUFF(S): 160; 4.5 AEROSOL RESPIRATORY (INHALATION) at 10:21

## 2020-06-13 RX ADMIN — SENNA PLUS 2 TABLET(S): 8.6 TABLET ORAL at 21:59

## 2020-06-13 RX ADMIN — Medication 20 UNIT(S): at 00:05

## 2020-06-13 RX ADMIN — Medication 20 MILLIGRAM(S): at 19:22

## 2020-06-13 RX ADMIN — Medication 40 MILLIGRAM(S): at 06:16

## 2020-06-13 RX ADMIN — Medication 4: at 00:05

## 2020-06-13 RX ADMIN — Medication 3: at 21:59

## 2020-06-13 RX ADMIN — Medication 40 MILLIGRAM(S): at 17:11

## 2020-06-13 RX ADMIN — Medication 1: at 18:00

## 2020-06-13 RX ADMIN — ISOSORBIDE MONONITRATE 120 MILLIGRAM(S): 60 TABLET, EXTENDED RELEASE ORAL at 12:46

## 2020-06-13 RX ADMIN — CLOPIDOGREL BISULFATE 75 MILLIGRAM(S): 75 TABLET, FILM COATED ORAL at 12:46

## 2020-06-13 RX ADMIN — Medication 1 DROP(S): at 06:16

## 2020-06-13 RX ADMIN — ATORVASTATIN CALCIUM 80 MILLIGRAM(S): 80 TABLET, FILM COATED ORAL at 21:58

## 2020-06-13 RX ADMIN — BUDESONIDE AND FORMOTEROL FUMARATE DIHYDRATE 2 PUFF(S): 160; 4.5 AEROSOL RESPIRATORY (INHALATION) at 23:39

## 2020-06-13 RX ADMIN — ENOXAPARIN SODIUM 40 MILLIGRAM(S): 100 INJECTION SUBCUTANEOUS at 12:46

## 2020-06-13 RX ADMIN — Medication 15 UNIT(S): at 21:58

## 2020-06-13 RX ADMIN — LOSARTAN POTASSIUM 100 MILLIGRAM(S): 100 TABLET, FILM COATED ORAL at 06:16

## 2020-06-13 NOTE — PROVIDER CONTACT NOTE (OTHER) - ACTION/TREATMENT ORDERED:
Kailey Lance made aware, glucose fibngerstick will be taken two hours from this one. cyrus continue to monitor

## 2020-06-13 NOTE — CONSULT NOTE ADULT - ATTENDING COMMENTS
[facility] NEPHROLOGY  Joey Mc M.D.  Lito Clayton D.O.  Guerda Suarez M.D.  Kelsea Nash, MSN, ANP-C  [phone]    [address] [place] [address]
70 yo with CAD s/p CABG admitted with cough and SOB. MICU consulted for new NIV.  At the time of my exam, patient is off NIV, breathing comfortably, speaking in full sentences. Would recommend cardiac workup, diuresis. Can use NIV prn.  Not MICU candidate at this time. Please reconsult as needed.

## 2020-06-13 NOTE — PHYSICAL THERAPY INITIAL EVALUATION ADULT - PATIENT PROFILE REVIEW, REHAB EVAL
PT orders received: no formal activity order. Consult with RN Melia VICTOR, pt may participate in PT evaluation and ambulate with PT./yes

## 2020-06-13 NOTE — DIETITIAN INITIAL EVALUATION ADULT. - PERTINENT LABORATORY DATA
06-13 Na 135 mmol/L Glu 191 mg/dL<H> K+ 3.9 mmol/L Cr 1.37 mg/dL<H> BUN 32 mg/dL<H> Phos n/a    06-13 @ 07:38 POCT 132 mg/dL  06-13 @ 03:27 POCT 374 mg/dL  06-13 @ 01:23 POCT 517 mg/dL  06-13 @ 01:07 POCT 579 mg/dL  06-12 @ 23:59 POCT 543 mg/dL  06-12 @ 23:58 POCT 559 mg/dL

## 2020-06-13 NOTE — DIETITIAN INITIAL EVALUATION ADULT. - ADD RECOMMEND
1). Monitor weights, labs, BM's, skin integrity, p.o. intake and fluid status. 2). Follow pt as per protocol.

## 2020-06-13 NOTE — PROGRESS NOTE ADULT - SUBJECTIVE AND OBJECTIVE BOX
CC: c/o mild dyspnea    TELEMETRY:     PHYSICAL EXAM:    T(C): 36.6 (06-13-20 @ 06:14), Max: 37.3 (06-12-20 @ 11:39)  HR: 82 (06-13-20 @ 06:14) (78 - 98)  BP: 134/78 (06-13-20 @ 06:14) (122/66 - 150/56)  RR: 18 (06-13-20 @ 06:14) (15 - 30)  SpO2: 100% (06-13-20 @ 06:14) (95% - 100%)  Wt(kg): --  I&O's Summary    12 Jun 2020 07:01  -  13 Jun 2020 07:00  --------------------------------------------------------  IN: 380 mL / OUT: 1130 mL / NET: -750 mL    13 Jun 2020 07:01  -  13 Jun 2020 10:23  --------------------------------------------------------  IN: 0 mL / OUT: 600 mL / NET: -600 mL        Appearance: Normal	  Cardiovascular: Normal S1 S2,RRR, No JVD, No murmurs  Respiratory: Lungs clear to auscultation	  Gastrointestinal:  Soft, Non-tender, + BS	  Extremities: Normal range of motion, No clubbing, cyanosis or edema  Vascular: Peripheral pulses palpable 2+ bilaterally     LABS:	 	                          11.3   9.60  )-----------( 225      ( 13 Jun 2020 06:36 )             34.8     06-13    135  |  95<L>  |  32<H>  ----------------------------<  191<H>  3.9   |  24  |  1.37<H>    Ca    9.6      13 Jun 2020 06:36  Phos  2.8     06-12  Mg     2.1     06-12    TPro  6.8  /  Alb  4.3  /  TBili  0.4  /  DBili  x   /  AST  22  /  ALT  27  /  AlkPhos  64  06-13      PT/INR - ( 12 Jun 2020 11:15 )   PT: 11.0 SEC;   INR: 0.96          PTT - ( 12 Jun 2020 11:15 )  PTT:28.3 SEC    CARDIAC MARKERS:      MEDICATIONS  (STANDING):  artificial  tears Solution 1 Drop(s) Both EYES two times a day  aspirin enteric coated 81 milliGRAM(s) Oral daily  atorvastatin 80 milliGRAM(s) Oral at bedtime  budesonide 160 MICROgram(s)/formoterol 4.5 MICROgram(s) Inhaler 2 Puff(s) Inhalation two times a day  clopidogrel Tablet 75 milliGRAM(s) Oral daily  dextrose 5%. 1000 milliLiter(s) (50 mL/Hr) IV Continuous <Continuous>  dextrose 50% Injectable 12.5 Gram(s) IV Push once  dextrose 50% Injectable 25 Gram(s) IV Push once  dextrose 50% Injectable 25 Gram(s) IV Push once  enoxaparin Injectable 40 milliGRAM(s) SubCutaneous daily  furosemide   Injectable 40 milliGRAM(s) IV Push two times a day  insulin detemir injectable (LEVEMIR) 20 Unit(s) SubCutaneous at bedtime  insulin lispro (HumaLOG) corrective regimen sliding scale   SubCutaneous three times a day before meals  insulin lispro (HumaLOG) corrective regimen sliding scale   SubCutaneous at bedtime  isosorbide   mononitrate ER Tablet (IMDUR) 120 milliGRAM(s) Oral daily  losartan 100 milliGRAM(s) Oral daily  tamsulosin 0.4 milliGRAM(s) Oral at bedtime

## 2020-06-13 NOTE — PROGRESS NOTE ADULT - SUBJECTIVE AND OBJECTIVE BOX
Patient is a 69y old  Male who presents with a chief complaint of Shortness of Breath (2020 14:24)      SUBJECTIVE / OVERNIGHT EVENTS:  feeling better  on IV lasix  sugars high overnight  this am a lot better  tele no events  no cp, no sob, no n/v/d. no abdominal pain.  no headache, no dizziness.       Vital Signs Last 24 Hrs  T(C): 36.8 (2020 12:44), Max: 37.2 (2020 15:00)  T(F): 98.2 (2020 12:44), Max: 98.9 (2020 15:00)  HR: 80 (2020 12:44) (80 - 98)  BP: 129/66 (2020 12:44) (122/66 - 137/67)  BP(mean): --  RR: 18 (2020 12:44) (15 - 20)  SpO2: 99% (2020 12:44) (99% - 100%)  I&O's Summary    2020 07:01  -  2020 07:00  --------------------------------------------------------  IN: 380 mL / OUT: 1130 mL / NET: -750 mL    2020 07:01  -  2020 14:37  --------------------------------------------------------  IN: 0 mL / OUT: 600 mL / NET: -600 mL        PHYSICAL EXAM:  GENERAL: NAD, Comfortable, now on room air  HEAD:  Atraumatic, Normocephalic  EYES: EOMI, PERRLA, conjunctiva and sclera clear  NECK: Supple, No JVD  CHEST/LUNG: mild decrease breath sounds bilaterally, mild basilar crackles; No wheeze   HEART: Regular rate and rhythm; No murmurs, rubs, or gallops  ABDOMEN: Soft, Nontender, Nondistended; Bowel sounds present  Neuro: AAO x 3, no focal deficit, 5/5 b/l extremities  EXTREMITIES:  2+ Peripheral Pulses, No clubbing, cyanosis, +trace edema  SKIN: No rashes or lesions      LABS:                        11.3   9.60  )-----------( 225      ( 2020 06:36 )             34.8     06-13    135  |  95<L>  |  32<H>  ----------------------------<  191<H>  3.9   |  24  |  1.37<H>    Ca    9.6      2020 06:36  Phos  2.8     06-12  Mg     2.1     06-12    TPro  6.8  /  Alb  4.3  /  TBili  0.4  /  DBili  x   /  AST  22  /  ALT  27  /  AlkPhos  64  06-13    PT/INR - ( 2020 11:15 )   PT: 11.0 SEC;   INR: 0.96          PTT - ( 2020 11:15 )  PTT:28.3 SEC  CAPILLARY BLOOD GLUCOSE      POCT Blood Glucose.: 166 mg/dL (2020 12:24)  POCT Blood Glucose.: 132 mg/dL (2020 07:38)  POCT Blood Glucose.: 374 mg/dL (2020 03:27)  POCT Blood Glucose.: 517 mg/dL (2020 01:23)  POCT Blood Glucose.: 579 mg/dL (2020 01:07)  POCT Blood Glucose.: 543 mg/dL (2020 23:59)  POCT Blood Glucose.: 559 mg/dL (2020 23:58)        Urinalysis Basic - ( 2020 08:09 )    Color: COLORLESS / Appearance: CLEAR / S.008 / pH: 6.0  Gluc: 500 / Ketone: NEGATIVE  / Bili: NEGATIVE / Urobili: NORMAL   Blood: TRACE / Protein: NEGATIVE / Nitrite: NEGATIVE   Leuk Esterase: NEGATIVE / RBC: x / WBC x   Sq Epi: x / Non Sq Epi: x / Bacteria: x        RADIOLOGY & ADDITIONAL TESTS:    Imaging Personally Reviewed:  [x] YES  [ ] NO    Consultant(s) Notes Reviewed:  [x] YES  [ ] NO      MEDICATIONS  (STANDING):  artificial  tears Solution 1 Drop(s) Both EYES two times a day  aspirin enteric coated 81 milliGRAM(s) Oral daily  atorvastatin 80 milliGRAM(s) Oral at bedtime  budesonide 160 MICROgram(s)/formoterol 4.5 MICROgram(s) Inhaler 2 Puff(s) Inhalation two times a day  clopidogrel Tablet 75 milliGRAM(s) Oral daily  dextrose 5%. 1000 milliLiter(s) (50 mL/Hr) IV Continuous <Continuous>  dextrose 50% Injectable 12.5 Gram(s) IV Push once  dextrose 50% Injectable 25 Gram(s) IV Push once  dextrose 50% Injectable 25 Gram(s) IV Push once  enoxaparin Injectable 40 milliGRAM(s) SubCutaneous daily  furosemide   Injectable 40 milliGRAM(s) IV Push two times a day  insulin detemir injectable (LEVEMIR) 20 Unit(s) SubCutaneous at bedtime  insulin lispro (HumaLOG) corrective regimen sliding scale   SubCutaneous three times a day before meals  insulin lispro (HumaLOG) corrective regimen sliding scale   SubCutaneous at bedtime  isosorbide   mononitrate ER Tablet (IMDUR) 120 milliGRAM(s) Oral daily  tamsulosin 0.4 milliGRAM(s) Oral at bedtime    MEDICATIONS  (PRN):  acetaminophen   Tablet .. 650 milliGRAM(s) Oral every 6 hours PRN Temp greater or equal to 38C (100.4F), Mild Pain (1 - 3), Moderate Pain (4 - 6)  ALBUTerol    90 MICROgram(s) HFA Inhaler 2 Puff(s) Inhalation every 6 hours PRN Shortness of Breath and/or Wheezing  dextrose 40% Gel 15 Gram(s) Oral once PRN Blood Glucose LESS THAN 70 milliGRAM(s)/deciliter  glucagon  Injectable 1 milliGRAM(s) IntraMuscular once PRN Glucose LESS THAN 70 milligrams/deciliter  nitroglycerin     SubLingual 0.4 milliGRAM(s) SubLingual every 5 minutes PRN Chest Pain      Care Discussed with Consultants/Other Providers [x] YES  [ ] NO    HEALTH ISSUES - PROBLEM Dx:  Need for prophylactic measure: Need for prophylactic measure  HLD (hyperlipidemia): HLD (hyperlipidemia)  HTN (hypertension): HTN (hypertension)  BPH (benign prostatic hyperplasia): BPH (benign prostatic hyperplasia)  Chronic obstructive pulmonary disease, unspecified COPD type: Chronic obstructive pulmonary disease, unspecified COPD type  Diabetes mellitus: Diabetes mellitus  Acute on chronic heart failure: Acute on chronic heart failure

## 2020-06-13 NOTE — DIETITIAN INITIAL EVALUATION ADULT. - OTHER INFO
70 y/o male admitted with dx of CHF exacerbation. Nutrition consult generated through CHF order set link. Unable to conduct a face to face interview or nutrition-focused physical exam due to mitigation efforts to reduce contacts during COVID19 Pandemic. Spoke with pt on room phone to obtain nutrition hx. Patient reported having a good appetite consuming 3 meals a day. He tries to monitor carbohydrate intake as well. Pt said he monitors his blood sugars 3 times a day before meals. FS overnight ran in the 500s. 6/13 HbA1C: 7.8%. Pt currently consuming 75% of meals. He denies any nausea/vomiting/diarrhea/constipation or difficulty chewing and swallowing. Last BM 6/12. Patient reports no food allergies or intolerances. Reported usual weight as 180 lbs - his current weight is 86.2 kg (189.64 lbs) a 10 lb weight increase due to fluid overload; no edema noted on nursing flow sheet. RDN reviewed portion control and distribution of carbohydrate foods throughout the day, general healthful eating and basic hearth healthy (DASH/TLC) diet principles. Also discussed the importance of lifestyle changes, low sodium meal preparation, glycemic control and fluid restriction (1500 ml). Pt reports good understanding of education given. RDN services to remain available as needed.

## 2020-06-13 NOTE — PROGRESS NOTE ADULT - ASSESSMENT
67 y/o male with PMHx of HTN, T2DM, CAD s/p CABG 09/2014 & GUILLE to ostial (09/2015), % with patent grafts (with patent LIMA to LAD and patent SVG to diag), recent left Heart cath with nonobstructive disease in January 2019, presenting with worsening SOB for several days, +cough. He reports orthopnea  and worsening LE swelling.      # Dyspnea likely acute on chronic heart failure in the setting of volume overload with bibasilar crackles  s/p Lasix 40 x1 in ED  diurese per cardiology, currently on IV 40 mg BID  TTE  LVEF= 50-55%.  tele monitoring: no events  weaned off bipap, now on nasal canula to room air  stricts I/O, daily weights  no sick contact, doubt COVID19, nasal swab neg  CXR no evidence of PNA. monitor off abx.   no wheezing on exam to suggest COPD exacerbation. c/w home meds: inhalers. duoneb PRN.   mild leukocytosis resolved.   neg blood cx, U/A, CXR, COVID PCR    # hx of CAD s/p CABG  cardio eval, resume home meds: Asa/Plavix/statin/betablocker  TTE as above  trend trops, likely demand ischemia  recent left Heart cath with nonobstructive disease     # Acute kidney failure  likely in the setting of cardio renal etiology.   r/o obstructive etiology. check post void bladder scan.   trend Cr, holding Losartan per renal  monitor urine outpt  avoid nephrotoxic agents    #Diabetes:   hgbA1C 7.8%, start ISS  he takes Levemir 20 units QHS and metformin  Endo consult     DVT ppx    - [facility]ICK [facility]  - [phone]

## 2020-06-13 NOTE — PHYSICAL THERAPY INITIAL EVALUATION ADULT - ADDITIONAL COMMENTS
Patient reports he lives with his son in an apartment, 15 steps to apartment. Patient reports he was previously independent in all ADLs and did not use an assistive device for ambulation.     Patient was left semi-supine in bed as found, all lines/tubes intact and call bob within reach, JULIUS finley

## 2020-06-13 NOTE — PHYSICAL THERAPY INITIAL EVALUATION ADULT - PERTINENT HX OF CURRENT PROBLEM, REHAB EVAL
Patient is a 69 year old male admitted to [facility] on 6/12 with shortness of breath. PMH: HTN, COPD, CHF, HLD, DM, CABG.

## 2020-06-13 NOTE — CONSULT NOTE ADULT - SUBJECTIVE AND OBJECTIVE BOX
[facility] NEPHROLOGY- CONSULTATION NOTE    Patient is a 69y Male with HTN, COPD, CHF, HLD, DM, CABG p/w SOB. Pt a/w acute on chronic CHF. Nephrology consulted for Elevated serum creatinine.  Pt denies any history of previous kidney disease. Patient denies any NSAID use, recent CT with contrast, hepatitis or blood transfusions.  Pt denies any urinary complaints   Pt states he developed sudden onset SOB. He denies any fevers, cough, chest pain, abd pain, n/v/d or LE edema. +dizziness.       PAST MEDICAL & SURGICAL HISTORY:  Chronic obstructive pulmonary disease, unspecified COPD type  CHF (congestive heart failure)  BPH (benign prostatic hyperplasia)  Lacunar infarction  Diabetes mellitus  HLD (hyperlipidemia)  HTN (hypertension)  S/P CABG (coronary artery bypass graft):     No Known Allergies    Home Medications Reviewed  Hospital Medications:   MEDICATIONS  (STANDING):  artificial  tears Solution 1 Drop(s) Both EYES two times a day  aspirin enteric coated 81 milliGRAM(s) Oral daily  atorvastatin 80 milliGRAM(s) Oral at bedtime  budesonide 160 MICROgram(s)/formoterol 4.5 MICROgram(s) Inhaler 2 Puff(s) Inhalation two times a day  clopidogrel Tablet 75 milliGRAM(s) Oral daily  dextrose 5%. 1000 milliLiter(s) (50 mL/Hr) IV Continuous <Continuous>  dextrose 50% Injectable 12.5 Gram(s) IV Push once  dextrose 50% Injectable 25 Gram(s) IV Push once  dextrose 50% Injectable 25 Gram(s) IV Push once  enoxaparin Injectable 40 milliGRAM(s) SubCutaneous daily  furosemide   Injectable 40 milliGRAM(s) IV Push two times a day  insulin detemir injectable (LEVEMIR) 20 Unit(s) SubCutaneous at bedtime  insulin lispro (HumaLOG) corrective regimen sliding scale   SubCutaneous three times a day before meals  insulin lispro (HumaLOG) corrective regimen sliding scale   SubCutaneous at bedtime  isosorbide   mononitrate ER Tablet (IMDUR) 120 milliGRAM(s) Oral daily  losartan 100 milliGRAM(s) Oral daily  tamsulosin 0.4 milliGRAM(s) Oral at bedtime    SOCIAL HISTORY:  Ex smoker, Denies any ETOH use drug use.   FAMILY HISTORY:  No pertinent family history in first degree relatives      REVIEW OF SYSTEMS:  Gen: no changes in weight +dizziness  HEENT: no rhinorrhea  Neck: no sore throat  Cards: no chest pain  Resp: + dyspnea improving   GI: no nausea or vomiting or diarrhea  : no dysuria or hematuria  Vascular: no LE edema  Derm: no rashes  Neuro: no numbness/tingling  All other review of systems is negative unless indicated above.    VITALS:  T(F): 97.9 (20 @ 06:14), Max: 98.9 (20 @ 15:00)  HR: 82 (20 @ 06:14)  BP: 134/78 (20 @ 06:14)  RR: 18 (20 @ 06:14)  SpO2: 100% (20 @ 06:14)  Wt(kg): --     @ 07:01  -   @ 07:00  --------------------------------------------------------  IN: 380 mL / OUT: 1130 mL / NET: -750 mL     @ 07:01  -   @ 11:58  --------------------------------------------------------  IN: 0 mL / OUT: 600 mL / NET: -600 mL      Height (cm): 167.64 ( @ 17:53)  Weight (kg): 82 ( @ 17:53)  BMI (kg/m2): 29.2 ( @ 17:53)  BSA (m2): 1.92 ( @ 17:53)    PHYSICAL EXAM:  Gen: NAD, calm  HEENT: MMM  Neck: no JVD  Cards: RRR, +S1/S2,   Resp: Left basilar rales  GI: soft, NT/ND, NABS  : no CVA tenderness  Extremities: no LE edema B/L  Derm: no rashes  Neuro: non-focal    LABS:      135  |  95<L>  |  32<H>  ----------------------------<  191<H>  3.9   |  24  |  1.37<H>    Ca    9.6      2020 06:36  Phos  2.8     06-12  Mg     2.1     06-12    TPro  6.8  /  Alb  4.3  /  TBili  0.4  /  DBili      /  AST  22  /  ALT  27  /  AlkPhos  64  06-13    Creatinine Trend: 1.37 <--, 1.48 <--, 1.39 <--, 1.31 <--                        11.3   9.60  )-----------( 225      ( 2020 06:36 )             34.8     Urine Studies:  Urinalysis Basic - ( 2020 08:09 )    Color: COLORLESS / Appearance: CLEAR / S.008 / pH: 6.0  Gluc: 500 / Ketone: NEGATIVE  / Bili: NEGATIVE / Urobili: NORMAL   Blood: TRACE / Protein: NEGATIVE / Nitrite: NEGATIVE   Leuk Esterase: NEGATIVE / RBC:  / WBC    Sq Epi:  / Non Sq Epi:  / Bacteria:         RADIOLOGY & ADDITIONAL STUDIES:    < from: Xray Chest 1 View- PORTABLE-Urgent (20 @ 12:15) >  XAM:  XR CHEST PORTABLE URGENT 1V        PROCEDURE DATE:  2020         INTERPRETATION:  CLINICAL INFORMATION: Shortness of breath    TECHNIQUE: Single frontal view of the chest.    COMPARISON: Chest radiograph 2014    FINDINGS:   Status post median sternotomy.  Lungs are clear..  The heart size cannot accurately be assessed on this projection.  The visualized osseous structures are unremarkable.    IMPRESSION:     Clear lungs.        < end of copied text >

## 2020-06-13 NOTE — CONSULT NOTE ADULT - SUBJECTIVE AND OBJECTIVE BOX
Reason For Consult: T2DM, unc hyperglycemia    HPI: 68 y/o male, with a PmHx of HTN, COPD, CHF, HLD, DM, CABG, presented to the [facility] ED with SOB.. Pt states at around 1900hrs last night, while at home, he had a sudden onset of SOB. He states he was just sitting at home and it came on suddenly and it was getting any better so he came to the hospital today for an evaluation. He denies any fever, chills, HA, blurred vision, chest pain, abd pain, n/v, sick contacts. He states he has been having some dizziness. In the [facility] ED, he was found to be hypoxic and was started on BIPAP but was later weaned off. Currently, he appears comfortable at this time saturating at 99% on 2 LPM nasal cannula. He is now being admitted to telemetry for acute on chronic CHF exacerbation. (12 Jun 2020 17:53)    Endocrine History:      PAST MEDICAL & SURGICAL HISTORY:  Chronic obstructive pulmonary disease, unspecified COPD type  CHF (congestive heart failure)  BPH (benign prostatic hyperplasia)  Lacunar infarction  Diabetes mellitus  HLD (hyperlipidemia)  HTN (hypertension)  S/P CABG (coronary artery bypass graft): 2014      FAMILY HISTORY:  No pertinent family history in first degree relatives      Social History:    Outpatient Medications:  Home Medications:   * Patient Currently Takes Medications as of 12-Jun-2020 17:35 documented in Structured Notes  · 	losartan 100 mg oral tablet: Last Dose Taken:  , 1 tab(s) orally once a day MDD:1  · 	Plavix 75 mg oral tablet: Last Dose Taken:  , 1 tab(s) orally once a day   · 	metFORMIN 1000 mg oral tablet: Last Dose Taken:  , 1 tab(s) orally 2 times a day  · 	Levemir 100 units/mL subcutaneous solution: Last Dose Taken:  , 20 unit(s) subcutaneous once a day (at bedtime)  · 	albuterol 90 mcg/inh inhalation powder: Last Dose Taken:  , 2 puff(s) inhaled every 6 hours  · 	Lipitor 80 mg oral tablet: Last Dose Taken:  , 1 tab(s) orally once a day  · 	Symbicort 160 mcg-4.5 mcg/inh inhalation aerosol: Last Dose Taken:  , 2 puff(s) inhaled 2 times a day  · 	Imdur 120 mg oral tablet, extended release: Last Dose Taken:  , 1 tab(s) orally once a day (in the morning)  · 	Liquifilm Tears preserved ophthalmic solution: Last Dose Taken:  , 1 drop(s) to each affected eye 2 times a day  · 	Flomax 0.4 mg oral capsule: Last Dose Taken:  , 1 cap(s) orally once a day  · 	aspirin 81 mg oral delayed release tablet: Last Dose Taken:  , 1 tab(s) orally once a day      MEDICATIONS  (STANDING):  artificial  tears Solution 1 Drop(s) Both EYES two times a day  aspirin enteric coated 81 milliGRAM(s) Oral daily  atorvastatin 80 milliGRAM(s) Oral at bedtime  budesonide 160 MICROgram(s)/formoterol 4.5 MICROgram(s) Inhaler 2 Puff(s) Inhalation two times a day  clopidogrel Tablet 75 milliGRAM(s) Oral daily  dextrose 5%. 1000 milliLiter(s) (50 mL/Hr) IV Continuous <Continuous>  dextrose 50% Injectable 12.5 Gram(s) IV Push once  dextrose 50% Injectable 25 Gram(s) IV Push once  dextrose 50% Injectable 25 Gram(s) IV Push once  enoxaparin Injectable 40 milliGRAM(s) SubCutaneous daily  furosemide   Injectable 40 milliGRAM(s) IV Push two times a day  insulin detemir injectable (LEVEMIR) 20 Unit(s) SubCutaneous at bedtime  insulin lispro (HumaLOG) corrective regimen sliding scale LOW  SubCutaneous three times a day before meals  insulin lispro (HumaLOG) corrective regimen sliding scale LOW  SubCutaneous at bedtime  isosorbide   mononitrate ER Tablet (IMDUR) 120 milliGRAM(s) Oral daily  losartan 100 milliGRAM(s) Oral daily  tamsulosin 0.4 milliGRAM(s) Oral at bedtime  DIET - Cons Carb    MEDICATIONS  (PRN):  acetaminophen   Tablet .. 650 milliGRAM(s) Oral every 6 hours PRN Temp greater or equal to 38C (100.4F), Mild Pain (1 - 3), Moderate Pain (4 - 6)  ALBUTerol    90 MICROgram(s) HFA Inhaler 2 Puff(s) Inhalation every 6 hours PRN Shortness of Breath and/or Wheezing  dextrose 40% Gel 15 Gram(s) Oral once PRN Blood Glucose LESS THAN 70 milliGRAM(s)/deciliter  glucagon  Injectable 1 milliGRAM(s) IntraMuscular once PRN Glucose LESS THAN 70 milligrams/deciliter  nitroglycerin     SubLingual 0.4 milliGRAM(s) SubLingual every 5 minutes PRN Chest Pain      Allergies  No Known Allergies      Review of Systems:  Constitutional: No fever  Eyes: No blurry vision  Neuro: No tremors  HEENT: No pain  Cardiovascular: No chest pain, palpitations  Respiratory: No SOB, no cough  GI: No nausea, vomiting, abdominal pain  : No dysuria  Skin: no rash  Psych: no depression  Endocrine: no polyuria, polydipsia  Hem/lymph: no swelling  Osteoporosis: no fractures    ALL OTHER SYSTEMS REVIEWED AND NEGATIVE    PHYSICAL EXAM:  VITALS: T(C): 36.8 (06-13-20 @ 12:44)  T(F): 98.2 (06-13-20 @ 12:44), Max: 98.9 (06-12-20 @ 15:00)  HR: 80 (06-13-20 @ 12:44) (80 - 98)  BP: 129/66 (06-13-20 @ 12:44) (122/66 - 137/67)  RR:  (15 - 20)  SpO2:  (99% - 100%)  Wt(kg): 82 kg    GENERAL: NAD, well-groomed, well-developed  EYES: No proptosis, no lid lag, anicteric  HEENT:  Atraumatic, Normocephalic, moist mucous membranes  THYROID: Normal size, no palpable nodules  RESPIRATORY: Clear to auscultation bilaterally; No rales, rhonchi, wheezing, or rubs  CARDIOVASCULAR: Regular rate and rhythm; No murmurs; no peripheral edema  GI: Soft, nontender, non distended, normal bowel sounds  SKIN: Dry, intact, No rashes or lesions  MUSCULOSKELETAL: Full range of motion, normal strength  NEURO: sensation intact, extraocular movements intact, no tremor, normal reflexes  PSYCH: Alert and oriented x 3, normal affect, normal mood  CUSHING'S SIGNS: no striae    POCT Blood Glucose.: 166 mg/dL (06-13-20 @ 12:24) Hum 1  POCT Blood Glucose.: 132 mg/dL (06-13-20 @ 07:38)  POCT Blood Glucose.: 374 mg/dL (06-13-20 @ 03:27)  POCT Blood Glucose.: 517 mg/dL (06-13-20 @ 01:23)  POCT Blood Glucose.: 579 mg/dL (06-13-20 @ 01:07) Hum 4    POCT Blood Glucose.: 543 mg/dL (06-12-20 @ 23:59) lantus 20, Hum 4  POCT Blood Glucose.: 559 mg/dL (06-12-20 @ 23:58)  POCT Blood Glucose.: 240 mg/dL (06-12-20 @ 10:48) Solumedrol 40 mg @ 11am                            11.3   9.60  )-----------( 225      ( 13 Jun 2020 06:36 )             34.8       06-13    135  |  95<L>  |  32<H>  ----------------------------<  191<H>  3.9   |  24  |  1.37<H>    EGFR if : 60  EGFR if non : 52    Ca    9.6      06-13  Mg     2.1     06-12  Phos  2.8     06-12    TPro  6.8  /  Alb  4.3  /  TBili  0.4  /  DBili  x   /  AST  22  /  ALT  27  /  AlkPhos  64  06-13    Thyroid Function Tests:    06-12 @ 15:00 TSH 1.05   06-13 Chol 115<L> LDL 50 HDL 60<H> Trig 47      A1C with Estimated Average Glucose (06.13.20 @ 06:36)    A1C with Estimated Average Glucose: 7.8% Reason For Consult: T2DM, unc hyperglycemia    HPI: 68 y/o male, with a PmHx of HTN, COPD, CHF, HLD, DM, CABG, presented to the [facility] ED with SOB.. Pt states at around 1900hrs last night, while at home, he had a sudden onset of SOB. He states he was just sitting at home and it came on suddenly and it was getting any better so he came to the hospital today for an evaluation. He denies any fever, chills, HA, blurred vision, chest pain, abd pain, n/v, sick contacts. He states he has been having some dizziness. In the [facility] ED, he was found to be hypoxic and was started on BIPAP but was later weaned off. Currently, he appears comfortable at this time saturating at 99% on 2 LPM nasal cannula. He is now being admitted to telemetry for acute on chronic CHF exacerbation. (12 Jun 2020 17:53)    Endocrine History:  Sees endocrinologist in [place], has other specialists in [place] as well.  T2DM  x 10 years or more  No neuropathy, no known retinopathy, + h/o CABG and CHF, no h/o CVA, no known h/o CKD. H/o Lacunar infarction as per hosp records  Since his CABG in 2014 has been on Levemir, takes 20 units QHS and also takes Metformin 1000 mg BID. [place] about Januvia and cannot recall if he was on it, states he may have been on Janumet and then his physician for some reason told him to only take Metformin. Now he is asking if Levemir can be stopped and if he can be only on orals. When I suggested possibly Januvia or Jardiance based on insurance coverage, patient states, he will not want to start it in the hospital and would like to discuss it with his endocrinologist at [place].   Not on any steroids at home.   Checks FS BID and states fasting -200  Bedtime FS are 170-200  Sometimes has overnight hypos of 40-60 with s/s and will eat something sweet  For meals, normally eats 2 big meals and 1 small lunch. B/D - roti, vegetables, and occ rice. L-salad or soup    Admits to taking Losartan, Imdur, Amlodipine and Lipitor for HTN and HLD.    For inpatient, hyperglycemia on 06/12 was noted after exposure to solumedrol 40 mg at 11am x 1 dose.   Patient not on any standing steroids in the hospital. Eating well inpatient.      PAST MEDICAL & SURGICAL HISTORY:  Chronic obstructive pulmonary disease, unspecified COPD type  CHF (congestive heart failure)  BPH (benign prostatic hyperplasia)  Lacunar infarction  Diabetes mellitus  HLD (hyperlipidemia)  HTN (hypertension)  S/P CABG (coronary artery bypass graft): 2014      FAMILY HISTORY:  No pertinent family history in first degree relatives  NO h/o T2DM    Social History: no tobacco and alcohol.    Outpatient Medications:  Home Medications:   * Patient Currently Takes Medications as of 12-Jun-2020 17:35 documented in Structured Notes  · 	losartan 100 mg oral tablet: Last Dose Taken:  , 1 tab(s) orally once a day MDD:1  · 	Plavix 75 mg oral tablet: Last Dose Taken:  , 1 tab(s) orally once a day   · 	metFORMIN 1000 mg oral tablet: Last Dose Taken:  , 1 tab(s) orally 2 times a day  · 	Levemir 100 units/mL subcutaneous solution: Last Dose Taken:  , 20 unit(s) subcutaneous once a day (at bedtime)  · 	albuterol 90 mcg/inh inhalation powder: Last Dose Taken:  , 2 puff(s) inhaled every 6 hours  · 	Lipitor 80 mg oral tablet: Last Dose Taken:  , 1 tab(s) orally once a day  · 	Symbicort 160 mcg-4.5 mcg/inh inhalation aerosol: Last Dose Taken:  , 2 puff(s) inhaled 2 times a day  · 	Imdur 120 mg oral tablet, extended release: Last Dose Taken:  , 1 tab(s) orally once a day (in the morning)  · 	Liquifilm Tears preserved ophthalmic solution: Last Dose Taken:  , 1 drop(s) to each affected eye 2 times a day  · 	Flomax 0.4 mg oral capsule: Last Dose Taken:  , 1 cap(s) orally once a day  · 	aspirin 81 mg oral delayed release tablet: Last Dose Taken:  , 1 tab(s) orally once a day      MEDICATIONS  (STANDING):  artificial  tears Solution 1 Drop(s) Both EYES two times a day  aspirin enteric coated 81 milliGRAM(s) Oral daily  atorvastatin 80 milliGRAM(s) Oral at bedtime  budesonide 160 MICROgram(s)/formoterol 4.5 MICROgram(s) Inhaler 2 Puff(s) Inhalation two times a day  clopidogrel Tablet 75 milliGRAM(s) Oral daily  dextrose 5%. 1000 milliLiter(s) (50 mL/Hr) IV Continuous <Continuous>  dextrose 50% Injectable 12.5 Gram(s) IV Push once  dextrose 50% Injectable 25 Gram(s) IV Push once  dextrose 50% Injectable 25 Gram(s) IV Push once  enoxaparin Injectable 40 milliGRAM(s) SubCutaneous daily  furosemide   Injectable 40 milliGRAM(s) IV Push two times a day  insulin detemir injectable (LEVEMIR) 20 Unit(s) SubCutaneous at bedtime  insulin lispro (HumaLOG) corrective regimen sliding scale LOW  SubCutaneous three times a day before meals  insulin lispro (HumaLOG) corrective regimen sliding scale LOW  SubCutaneous at bedtime  isosorbide   mononitrate ER Tablet (IMDUR) 120 milliGRAM(s) Oral daily  losartan 100 milliGRAM(s) Oral daily  tamsulosin 0.4 milliGRAM(s) Oral at bedtime  DIET - Cons Carb    MEDICATIONS  (PRN):  acetaminophen   Tablet .. 650 milliGRAM(s) Oral every 6 hours PRN Temp greater or equal to 38C (100.4F), Mild Pain (1 - 3), Moderate Pain (4 - 6)  ALBUTerol    90 MICROgram(s) HFA Inhaler 2 Puff(s) Inhalation every 6 hours PRN Shortness of Breath and/or Wheezing  dextrose 40% Gel 15 Gram(s) Oral once PRN Blood Glucose LESS THAN 70 milliGRAM(s)/deciliter  glucagon  Injectable 1 milliGRAM(s) IntraMuscular once PRN Glucose LESS THAN 70 milligrams/deciliter  nitroglycerin     SubLingual 0.4 milliGRAM(s) SubLingual every 5 minutes PRN Chest Pain      Allergies  No Known Allergies      Review of Systems:  Constitutional: No fever  Eyes: No blurry vision  Neuro: No tremors  HEENT: No pain  Cardiovascular: No chest pain, palpitations  Respiratory: No SOB, no cough  GI: No nausea, vomiting, abdominal pain  : No dysuria  Skin: no rash  Psych: no depression  Endocrine: no polyuria, polydipsia  Hem/lymph: no swelling  Osteoporosis: no fractures    ALL OTHER SYSTEMS REVIEWED AND NEGATIVE    PHYSICAL EXAM:  VITALS: T(C): 36.8 (06-13-20 @ 12:44)  T(F): 98.2 (06-13-20 @ 12:44), Max: 98.9 (06-12-20 @ 15:00)  HR: 80 (06-13-20 @ 12:44) (80 - 98)  BP: 129/66 (06-13-20 @ 12:44) (122/66 - 137/67)  RR:  (15 - 20)  SpO2:  (99% - 100%)  Wt(kg): 82 kg    GENERAL: NAD, well-groomed, well-developed, overweight  EYES: No proptosis, no lid lag, anicteric  HEENT:  Atraumatic, Normocephalic, moist mucous membranes  THYROID: Normal size, no palpable nodules  RESPIRATORY: Clear to auscultation bilaterally; No rales, rhonchi, wheezing, or rubs  CARDIOVASCULAR: Regular rate and rhythm; No murmurs; no peripheral edema  GI: Soft, nontender, non distended, normal bowel sounds  SKIN: Dry, intact, No rashes or lesions  MUSCULOSKELETAL: Full range of motion, normal strength  NEURO: sensation intact, extraocular movements intact, no tremor  PSYCH: Alert and oriented x 3, normal affect, normal mood  CUSHING'S SIGNS: no striae    POCT Blood Glucose.: 166 mg/dL (06-13-20 @ 12:24) [facility] 1  POCT Blood Glucose.: 132 mg/dL (06-13-20 @ 07:38)  POCT Blood Glucose.: 374 mg/dL (06-13-20 @ 03:27)  POCT Blood Glucose.: 517 mg/dL (06-13-20 @ 01:23)  POCT Blood Glucose.: 579 mg/dL (06-13-20 @ 01:07) Hum 4    POCT Blood Glucose.: 543 mg/dL (06-12-20 @ 23:59) lantus 20, Hum 4  POCT Blood Glucose.: 559 mg/dL (06-12-20 @ 23:58)  POCT Blood Glucose.: 240 mg/dL (06-12-20 @ 10:48) Solumedrol 40 mg @ 11am                            11.3   9.60  )-----------( 225      ( 13 Jun 2020 06:36 )             34.8       06-13    135  |  95<L>  |  32<H>  ----------------------------<  191<H>  3.9   |  24  |  1.37<H>    EGFR if : 60  EGFR if non : 52    Ca    9.6      06-13  Mg     2.1     06-12  Phos  2.8     06-12    TPro  6.8  /  Alb  4.3  /  TBili  0.4  /  DBili  x   /  AST  22  /  ALT  27  /  AlkPhos  64  06-13    Thyroid Function Tests:    06-12 @ 15:00 TSH 1.05   06-13 Chol 115<L> LDL 50 HDL 60<H> Trig 47      A1C with Estimated Average Glucose (06.13.20 @ 06:36)    A1C with Estimated Average Glucose: 7.8%

## 2020-06-13 NOTE — PROVIDER CONTACT NOTE (OTHER) - ACTION/TREATMENT ORDERED:
Kailey Lance made aware, 4 units of insulin will be given, blood gas and BMP will be drawn. glucose fibngerstick will be taken two hours from this one. will continue to monitor

## 2020-06-13 NOTE — PROVIDER CONTACT NOTE (OTHER) - SITUATION
Patient blood sugar 374. Patient does not appear to be in any form of distress. Patient denies symptoms of hyperglycemia, No dizziness.

## 2020-06-13 NOTE — CONSULT NOTE ADULT - ASSESSMENT
70 y/o male with poorly controlled T2Dm (A1c 7.8%) complicated with CABG and CHF here with CHF exacerbation. Noted to have hyperglycemia x/p 1 dose of solumedrol. Also with h/o nocturnal hypoglycemia at home on Levemir 20 units and Metformin 1000 mg BID. Also with h/o HTN and HLD. (high risk and high medical decision making).    T2DM  - Recommend consistent carb diet inpatient  - Lantus 20 units was appropriate yesterday given hyperglycemia (worsened by steroid medication x 1)  - Now that steroid effect has worn off, would recommend reduce Lantus to 15 units QHS  - Recommend Humalog Low SSI AC and HS  - Recommend RD consultation    For outpatient  - We discussed possibility of using Metformin (if EF is normal and kidney function is healthy) with Januvia or Jardiance (if covered) and stopping Levemir but patient wants to hold off on starting new medication and stopping insulin immediately. He would prefer to continue Levemir and Metformin 1000 mg BID and provide our recommendation to his endocrinologist at [facility] and then decide to change his regimen.  - D/C plan is likely Metformin and Levemir if no contraindications  - Goal A1c is 7% without hypoglycemia  - F/u with endocrinologist at [facility]    HTN and HLD  - Recommend continue current anti-hypertensive and statin medications as per cardiology recommendations    Anabel Wen DO  Pager: [phone]

## 2020-06-13 NOTE — DIETITIAN INITIAL EVALUATION ADULT. - PERTINENT MEDS FT
MEDICATIONS  (STANDING):  artificial  tears Solution 1 Drop(s) Both EYES two times a day  aspirin enteric coated 81 milliGRAM(s) Oral daily  atorvastatin 80 milliGRAM(s) Oral at bedtime  budesonide 160 MICROgram(s)/formoterol 4.5 MICROgram(s) Inhaler 2 Puff(s) Inhalation two times a day  clopidogrel Tablet 75 milliGRAM(s) Oral daily  dextrose 5%. 1000 milliLiter(s) (50 mL/Hr) IV Continuous <Continuous>  dextrose 50% Injectable 12.5 Gram(s) IV Push once  dextrose 50% Injectable 25 Gram(s) IV Push once  dextrose 50% Injectable 25 Gram(s) IV Push once  enoxaparin Injectable 40 milliGRAM(s) SubCutaneous daily  furosemide   Injectable 40 milliGRAM(s) IV Push two times a day  insulin detemir injectable (LEVEMIR) 20 Unit(s) SubCutaneous at bedtime  insulin lispro (HumaLOG) corrective regimen sliding scale   SubCutaneous three times a day before meals  insulin lispro (HumaLOG) corrective regimen sliding scale   SubCutaneous at bedtime  isosorbide   mononitrate ER Tablet (IMDUR) 120 milliGRAM(s) Oral daily  losartan 100 milliGRAM(s) Oral daily  tamsulosin 0.4 milliGRAM(s) Oral at bedtime

## 2020-06-13 NOTE — CONSULT NOTE ADULT - ASSESSMENT
Patient is a 69y Male with HTN, COPD, CHF, HLD, DM, CABG p/w SOB. Pt a/w acute on chronic CHF. Nephrology consulted for Elevated serum creatinine.    1. ELMER- previous SCr 1-1.1 in 2019. Non oliguric ELMER in the setting of CHF; renal function stable with good urine o/p on Lasix 40mg IV bid. Recc to hold Losartan if worsening SCr. UA with trace blood and neg protein, Check urine lytes and renal US. Strict I/Os. Avoid nephrotoxins/ NSAIDs/ RCA. Monitor BMP.    2. HTN- BP acceptable. If worsening SCr, please hold Losartan. Monitor BP    3. Acute on chronic CHF- plan as per Cardiology.     4. Hyperkalemia- resolved with medical management. Monitor lytes.

## 2020-06-13 NOTE — PROVIDER CONTACT NOTE (OTHER) - BACKGROUND
Patient 69year old male came in for respiratory failure. has  a past medical history of CHF, HTN and diabetes mellitus.

## 2020-06-13 NOTE — PROVIDER CONTACT NOTE (OTHER) - BACKGROUND
Patient 69year old male came in for respiratory failure. has  apast medical history of CHF, HTN and diabetes mellitus.

## 2020-06-13 NOTE — PROVIDER CONTACT NOTE (OTHER) - SITUATION
Patient blood sugar 559. Patient does not appear to be in any form of distress. Patient denies symptoms of hyperglycemia, No dizziness.

## 2020-06-13 NOTE — CONSULT NOTE ADULT - SUBJECTIVE AND OBJECTIVE BOX
Patient is a 69y old  Male who presents with a chief complaint of Shortness of Breath (2020 13:19)      HPI:  68 y/o male, with a PmHx of HTN, COPD, CHF, HLD, DM, CABG, presented to the [facility] ED with SOB.. Pt states at around 1900hrs last night, while at home, he had a sudden onset of SOB. He states he was just sitting at home and it came on suddenly and it was getting any better so he came to the hospital today for an evaluation. He denies any fever, chills, HA, blurred vision, chest pain, abd pain, n/v, sick contacts. He states he has been having some dizziness. In the [facility] ED, he was found to be hypoxic and was started on BIPAP but was later weaned off. Currently, he appears comfortable at this time saturating at 99% on 2 LPM nasal cannula. He is now being admitted to telemetry for acute on chronic CHF exacerbation. (2020 17:53)   he says he used to have asthma and used to smoke for many years:  Suddenly he became SOB at home: He takes Symbicort as well as albuterol and does have AUDI using cpap     ?FOLLOWING PRESENT  [ x] Hx of PE/DVT, [ y] Hx COPD, [ y] Hx of Asthma, [x ] Hx of Hospitalization, [y ]  Hx of BiPAP/CPAP use, [ y] Hx of AUDI    Allergies    No Known Allergies    Intolerances        PAST MEDICAL & SURGICAL HISTORY:  Chronic obstructive pulmonary disease, unspecified COPD type  CHF (congestive heart failure)  BPH (benign prostatic hyperplasia)  Lacunar infarction  Diabetes mellitus  HLD (hyperlipidemia)  HTN (hypertension)  S/P CABG (coronary artery bypass graft):       FAMILY HISTORY:  No pertinent family history in first degree relatives      Social History: [ 20 years ] TOBACCO                  [x ] ETOH                                 [ x IVDA/DRUGS    REVIEW OF SYSTEMS      General:	x    Skin/Breast:x  	  Ophthalmologic:x  	  ENMT:	x    Respiratory and Thorax: cough, ALBERT : Dyspnea  	  Cardiovascular:	x    Gastrointestinal:	x    Genitourinary:	x    Musculoskeletal:	x    Neurological:	x    Psychiatric:	x    Hematology/Lymphatics:	x    Endocrine:	  x  Allergic/Immunologic:	x    MEDICATIONS  (STANDING):  artificial  tears Solution 1 Drop(s) Both EYES two times a day  aspirin enteric coated 81 milliGRAM(s) Oral daily  atorvastatin 80 milliGRAM(s) Oral at bedtime  budesonide 160 MICROgram(s)/formoterol 4.5 MICROgram(s) Inhaler 2 Puff(s) Inhalation two times a day  clopidogrel Tablet 75 milliGRAM(s) Oral daily  dextrose 5%. 1000 milliLiter(s) (50 mL/Hr) IV Continuous <Continuous>  dextrose 50% Injectable 12.5 Gram(s) IV Push once  dextrose 50% Injectable 25 Gram(s) IV Push once  dextrose 50% Injectable 25 Gram(s) IV Push once  enoxaparin Injectable 40 milliGRAM(s) SubCutaneous daily  furosemide   Injectable 40 milliGRAM(s) IV Push two times a day  insulin detemir injectable (LEVEMIR) 20 Unit(s) SubCutaneous at bedtime  insulin lispro (HumaLOG) corrective regimen sliding scale   SubCutaneous three times a day before meals  insulin lispro (HumaLOG) corrective regimen sliding scale   SubCutaneous at bedtime  isosorbide   mononitrate ER Tablet (IMDUR) 120 milliGRAM(s) Oral daily  tamsulosin 0.4 milliGRAM(s) Oral at bedtime    MEDICATIONS  (PRN):  acetaminophen   Tablet .. 650 milliGRAM(s) Oral every 6 hours PRN Temp greater or equal to 38C (100.4F), Mild Pain (1 - 3), Moderate Pain (4 - 6)  ALBUTerol    90 MICROgram(s) HFA Inhaler 2 Puff(s) Inhalation every 6 hours PRN Shortness of Breath and/or Wheezing  dextrose 40% Gel 15 Gram(s) Oral once PRN Blood Glucose LESS THAN 70 milliGRAM(s)/deciliter  glucagon  Injectable 1 milliGRAM(s) IntraMuscular once PRN Glucose LESS THAN 70 milligrams/deciliter  nitroglycerin     SubLingual 0.4 milliGRAM(s) SubLingual every 5 minutes PRN Chest Pain       Vital Signs Last 24 Hrs  T(C): 36.8 (2020 12:44), Max: 37.2 (2020 15:00)  T(F): 98.2 (2020 12:44), Max: 98.9 (2020 15:00)  HR: 80 (2020 12:44) (80 - 98)  BP: 129/66 (2020 12:44) (122/66 - 137/67)  BP(mean): --  RR: 18 (2020 12:44) (15 - 20)  SpO2: 99% (2020 12:44) (99% - 100%)        I&O's Summary    2020 07:01  -  2020 07:00  --------------------------------------------------------  IN: 380 mL / OUT: 1130 mL / NET: -750 mL    2020 07:01  -  2020 14:25  --------------------------------------------------------  IN: 0 mL / OUT: 600 mL / NET: -600 mL        Physical Exam:   GENERAL: Obese++  HEENT: KAZ/   Atraumatic, Normocephalic  ENMT: No tonsillar erythema, exudates, or enlargement; Moist mucous membranes, Good dentition, No lesions  NECK: Supple, No JVD, Normal thyroid  CHEST/LUNG: Wheezing+  CVS: Regular rate and rhythm; No murmurs, rubs, or gallops  GI: : Soft, Nontender, Nondistended; Bowel sounds present  NERVOUS SYSTEM:  Alert & Oriented X3  EXTREMITIES:  Mild  edema  LYMPH: No lymphadenopathy noted  SKIN: No rashes or lesions  ENDOCRINOLOGY: No Thyromegaly  PSYCH: Appropriate    Labs:  -1.0<41<4>>55<<7.385>>-1.0<<3><<4><<5<<559>>, -3.5<41<4>>50<<7.345>>-3.5<<3><<4><<5<<509>>, -4.6<48<4>>58<<7.275>>-4.6<<3><<4><<5<<589>>                            11.3   9.60  )-----------( 225      ( 2020 06:36 )             34.8                         11.4   11.03 )-----------( 246      ( 2020 11:15 )             36.3     06-13    135  |  95<L>  |  32<H>  ----------------------------<  191<H>  3.9   |  24  |  1.37<H>  06-13    131<L>  |  92<L>  |  34<H>  ----------------------------<  551<HH>  4.3   |  22  |  1.48<H>  06-12    132<L>  |  96<L>  |  29<H>  ----------------------------<  204<H>  5.4<H>   |  18<L>  |  1.39<H>  12    132<L>  |  96<L>  |  28<H>  ----------------------------<  260<H>  5.4<H>   |  19<L>  |  1.31<H>    Ca    9.6      2020 06:36  Ca    9.5      2020 01:37  Ca    9.3      2020 15:00  Ca    9.2      2020 11:15  Phos  2.8     06-12  Mg     2.1     06-12    TPro  6.8  /  Alb  4.3  /  TBili  0.4  /  DBili  x   /  AST  22  /  ALT  27  /  AlkPhos  64  06-13  TPro  7.6  /  Alb  4.5  /  TBili  0.6  /  DBili  x   /  AST  34  /  ALT  33  /  AlkPhos  63  06-12    CAPILLARY BLOOD GLUCOSE      POCT Blood Glucose.: 166 mg/dL (2020 12:24)  POCT Blood Glucose.: 132 mg/dL (2020 07:38)  POCT Blood Glucose.: 374 mg/dL (2020 03:27)  POCT Blood Glucose.: 517 mg/dL (2020 01:23)  POCT Blood Glucose.: 579 mg/dL (2020 01:07)  POCT Blood Glucose.: 543 mg/dL (2020 23:59)  POCT Blood Glucose.: 559 mg/dL (2020 23:58)    LIVER FUNCTIONS - ( 2020 06:36 )  Alb: 4.3 g/dL / Pro: 6.8 g/dL / ALK PHOS: 64 u/L / ALT: 27 u/L / AST: 22 u/L / GGT: x           PT/INR - ( 2020 11:15 )   PT: 11.0 SEC;   INR: 0.96          PTT - ( 2020 11:15 )  PTT:28.3 SEC  Urinalysis Basic - ( 2020 08:09 )    Color: COLORLESS / Appearance: CLEAR / S.008 / pH: 6.0  Gluc: 500 / Ketone: NEGATIVE  / Bili: NEGATIVE / Urobili: NORMAL   Blood: TRACE / Protein: NEGATIVE / Nitrite: NEGATIVE   Leuk Esterase: NEGATIVE / RBC: x / WBC x   Sq Epi: x / Non Sq Epi: x / Bacteria: x      D DImer  Serum Pro-Brain Natriuretic Peptide: 2523 pg/mL ( @ 11:15)      Studies  Chest X-RAY  CT SCAN Chest   CT Abdomen  Venous Dopplers: LE:   Others    < from: US Duplex Venous Lower Ext Ltd, Right (20 @ 09:18) >      PROCEDURE DATE:  2020         INTERPRETATION:  CLINICAL INFORMATION: Right lower extremity edema    COMPARISON: None available.    TECHNIQUE: Duplex sonography of the RIGHT LOWER extremity veins with color and spectral Doppler, with and without compression.      FINDINGS:  There is normal compressibility of the right common femoral, femoral and popliteal veins.   The contralateral common femoral vein is patent.  Doppler examination shows normal spontaneous and phasic flow.    No calf vein thrombosis is detected.    IMPRESSION:   No evidence of right lower extremity deep venous thrombosis.                < from: Xray Chest 1 View- PORTABLE-Urgent (20 @ 12:15) >    EXAM:  XR CHEST PORTABLE URGENT 1V        PROCEDURE DATE:  2020         INTERPRETATION:  CLINICAL INFORMATION: Shortness of breath    TECHNIQUE: Single frontal view of the chest.    COMPARISON: Chest radiograph 2014    FINDINGS:   Status post median sternotomy.  Lungs are clear..  The heart size cannot accurately be assessed on this projection.  The visualized osseous structures are unremarkable.    IMPRESSION:     Clear lungs.                ADIEL RUIZ M.D., RADIOLOGY RESIDENT  This document has been electronically signed.  FRAN D GARCIA M.D., ATTENDING RADIOLOGIST  This document has been electronically signed. 2020  3:46PM              < end of copied text >            PAOLO AKHTAR M.D., RADIOLOGY RESIDENT  This document has been electronically signed.  MYKE SOLER M.D., ATTENDING RADIOLOGIST  This document has been electronically signed. 2020  9:35AM                < end of copied text >

## 2020-06-13 NOTE — CONSULT NOTE ADULT - ASSESSMENT
70 y/o male, with a PmHx of HTN, COPD, CHF, HLD, DM, CABG, presented to the [facility] ED with SOB. Admitted to telemetry for acute on chronic chf exacerbation.    COPD exacerbation:  CHF exacerbation:   Obesity AUDI:  HTN, HLD  DM: CABG     he is wheezing : Starts steroids  cont symbicort:  Duoneb q 6 hours: Do d dimer: if high : cta chest otherwise ct chest without contrast  start bipap at night time: 10/5: 30 % fio2::   though he uses capa at home but he was hypercarbic on admission:  Controlled  Cont diuretics:  echo: : for cath eventually:   Hypercarbic resp failure: improved on bipap   JOSE J Kaba

## 2020-06-13 NOTE — PROGRESS NOTE ADULT - ASSESSMENT
68 y/o male, with a PmHx of HTN, COPD, CHF, HLD, DM, CABG, presented to the [facility] ED with SOB. Admitted to telemetry for acute on chronic chf exacerbation.    1. CHF exacerbation  -followup echo  -cont IV lasix    2. CAD s/p CABG  eventual ischemic eval once euvolemic -likely cath  cont dap  r/o covid, check rvp    3. HTN  -cont current meds    4. DM  -mgmt per medicine    DVT ppx

## 2020-06-13 NOTE — PROVIDER CONTACT NOTE (OTHER) - SITUATION
Patient blood sugar 559. Patient appwars to be in no distress. Patient denies symptoms of hyperglycemia, No dizziness.

## 2020-06-14 LAB
ANION GAP SERPL CALC-SCNC: 16 MMO/L — HIGH (ref 7–14)
ANION GAP SERPL CALC-SCNC: 17 MMO/L — HIGH (ref 7–14)
BUN SERPL-MCNC: 52 MG/DL — HIGH (ref 7–23)
BUN SERPL-MCNC: 53 MG/DL — HIGH (ref 7–23)
CALCIUM SERPL-MCNC: 9.4 MG/DL — SIGNIFICANT CHANGE UP (ref 8.4–10.5)
CALCIUM SERPL-MCNC: 9.7 MG/DL — SIGNIFICANT CHANGE UP (ref 8.4–10.5)
CHLORIDE SERPL-SCNC: 96 MMOL/L — LOW (ref 98–107)
CHLORIDE SERPL-SCNC: 99 MMOL/L — SIGNIFICANT CHANGE UP (ref 98–107)
CHLORIDE UR-SCNC: 41 MMOL/L — SIGNIFICANT CHANGE UP
CO2 SERPL-SCNC: 24 MMOL/L — SIGNIFICANT CHANGE UP (ref 22–31)
CO2 SERPL-SCNC: 25 MMOL/L — SIGNIFICANT CHANGE UP (ref 22–31)
CREAT ?TM UR-MCNC: 19.4 MG/DL — SIGNIFICANT CHANGE UP
CREAT SERPL-MCNC: 1.52 MG/DL — HIGH (ref 0.5–1.3)
CREAT SERPL-MCNC: 1.79 MG/DL — HIGH (ref 0.5–1.3)
GLUCOSE SERPL-MCNC: 294 MG/DL — HIGH (ref 70–99)
GLUCOSE SERPL-MCNC: 517 MG/DL — CRITICAL HIGH (ref 70–99)
HCT VFR BLD CALC: 37.2 % — LOW (ref 39–50)
HGB BLD-MCNC: 12.2 G/DL — LOW (ref 13–17)
MAGNESIUM SERPL-MCNC: 2.1 MG/DL — SIGNIFICANT CHANGE UP (ref 1.6–2.6)
MAGNESIUM SERPL-MCNC: 2.1 MG/DL — SIGNIFICANT CHANGE UP (ref 1.6–2.6)
MCHC RBC-ENTMCNC: 28.3 PG — SIGNIFICANT CHANGE UP (ref 27–34)
MCHC RBC-ENTMCNC: 32.8 % — SIGNIFICANT CHANGE UP (ref 32–36)
MCV RBC AUTO: 86.3 FL — SIGNIFICANT CHANGE UP (ref 80–100)
NRBC # FLD: 0 K/UL — SIGNIFICANT CHANGE UP (ref 0–0)
NT-PROBNP SERPL-SCNC: 4597 PG/ML — SIGNIFICANT CHANGE UP
PHOSPHATE SERPL-MCNC: 3.6 MG/DL — SIGNIFICANT CHANGE UP (ref 2.5–4.5)
PHOSPHATE SERPL-MCNC: 4.3 MG/DL — SIGNIFICANT CHANGE UP (ref 2.5–4.5)
PLATELET # BLD AUTO: 244 K/UL — SIGNIFICANT CHANGE UP (ref 150–400)
PMV BLD: 12.5 FL — SIGNIFICANT CHANGE UP (ref 7–13)
POTASSIUM SERPL-MCNC: 3.8 MMOL/L — SIGNIFICANT CHANGE UP (ref 3.5–5.3)
POTASSIUM SERPL-MCNC: 4.5 MMOL/L — SIGNIFICANT CHANGE UP (ref 3.5–5.3)
POTASSIUM SERPL-SCNC: 3.8 MMOL/L — SIGNIFICANT CHANGE UP (ref 3.5–5.3)
POTASSIUM SERPL-SCNC: 4.5 MMOL/L — SIGNIFICANT CHANGE UP (ref 3.5–5.3)
PROT UR-MCNC: 6.6 MG/DL — SIGNIFICANT CHANGE UP
RBC # BLD: 4.31 M/UL — SIGNIFICANT CHANGE UP (ref 4.2–5.8)
RBC # FLD: 15.1 % — HIGH (ref 10.3–14.5)
SODIUM SERPL-SCNC: 137 MMOL/L — SIGNIFICANT CHANGE UP (ref 135–145)
SODIUM SERPL-SCNC: 140 MMOL/L — SIGNIFICANT CHANGE UP (ref 135–145)
SODIUM UR-SCNC: 46 MMOL/L — SIGNIFICANT CHANGE UP
UUN UR-MCNC: 377.4 MG/DL — SIGNIFICANT CHANGE UP
WBC # BLD: 8.86 K/UL — SIGNIFICANT CHANGE UP (ref 3.8–10.5)
WBC # FLD AUTO: 8.86 K/UL — SIGNIFICANT CHANGE UP (ref 3.8–10.5)

## 2020-06-14 PROCEDURE — 71250 CT THORAX DX C-: CPT | Mod: 26

## 2020-06-14 PROCEDURE — 76770 US EXAM ABDO BACK WALL COMP: CPT | Mod: 26

## 2020-06-14 PROCEDURE — 99232 SBSQ HOSP IP/OBS MODERATE 35: CPT

## 2020-06-14 RX ORDER — INSULIN DETEMIR 100/ML (3)
19 INSULIN PEN (ML) SUBCUTANEOUS AT BEDTIME
Refills: 0 | Status: DISCONTINUED | OUTPATIENT
Start: 2020-06-14 | End: 2020-06-16

## 2020-06-14 RX ORDER — POLYETHYLENE GLYCOL 3350 17 G/17G
17 POWDER, FOR SOLUTION ORAL DAILY
Refills: 0 | Status: DISCONTINUED | OUTPATIENT
Start: 2020-06-14 | End: 2020-06-18

## 2020-06-14 RX ORDER — INSULIN LISPRO 100/ML
3 VIAL (ML) SUBCUTANEOUS
Refills: 0 | Status: DISCONTINUED | OUTPATIENT
Start: 2020-06-14 | End: 2020-06-14

## 2020-06-14 RX ORDER — INSULIN LISPRO 100/ML
4 VIAL (ML) SUBCUTANEOUS ONCE
Refills: 0 | Status: COMPLETED | OUTPATIENT
Start: 2020-06-14 | End: 2020-06-14

## 2020-06-14 RX ORDER — INSULIN DETEMIR 100/ML (3)
29 INSULIN PEN (ML) SUBCUTANEOUS AT BEDTIME
Refills: 0 | Status: DISCONTINUED | OUTPATIENT
Start: 2020-06-14 | End: 2020-06-14

## 2020-06-14 RX ORDER — INSULIN LISPRO 100/ML
8 VIAL (ML) SUBCUTANEOUS ONCE
Refills: 0 | Status: COMPLETED | OUTPATIENT
Start: 2020-06-14 | End: 2020-06-14

## 2020-06-14 RX ORDER — FUROSEMIDE 40 MG
40 TABLET ORAL DAILY
Refills: 0 | Status: DISCONTINUED | OUTPATIENT
Start: 2020-06-15 | End: 2020-06-16

## 2020-06-14 RX ORDER — ACETYLCYSTEINE 200 MG/ML
1200 VIAL (ML) MISCELLANEOUS
Refills: 0 | Status: COMPLETED | OUTPATIENT
Start: 2020-06-14 | End: 2020-06-16

## 2020-06-14 RX ORDER — INSULIN LISPRO 100/ML
6 VIAL (ML) SUBCUTANEOUS
Refills: 0 | Status: DISCONTINUED | OUTPATIENT
Start: 2020-06-14 | End: 2020-06-15

## 2020-06-14 RX ADMIN — Medication 20 MILLIGRAM(S): at 05:29

## 2020-06-14 RX ADMIN — Medication 1 DROP(S): at 17:57

## 2020-06-14 RX ADMIN — ATORVASTATIN CALCIUM 80 MILLIGRAM(S): 80 TABLET, FILM COATED ORAL at 21:28

## 2020-06-14 RX ADMIN — Medication 4 UNIT(S): at 01:32

## 2020-06-14 RX ADMIN — Medication 3 UNIT(S): at 12:40

## 2020-06-14 RX ADMIN — POLYETHYLENE GLYCOL 3350 17 GRAM(S): 17 POWDER, FOR SOLUTION ORAL at 12:39

## 2020-06-14 RX ADMIN — Medication 3 UNIT(S): at 17:56

## 2020-06-14 RX ADMIN — Medication 20 MILLIGRAM(S): at 00:14

## 2020-06-14 RX ADMIN — Medication 1 DROP(S): at 05:28

## 2020-06-14 RX ADMIN — TAMSULOSIN HYDROCHLORIDE 0.4 MILLIGRAM(S): 0.4 CAPSULE ORAL at 21:28

## 2020-06-14 RX ADMIN — Medication 19 UNIT(S): at 21:28

## 2020-06-14 RX ADMIN — BUDESONIDE AND FORMOTEROL FUMARATE DIHYDRATE 2 PUFF(S): 160; 4.5 AEROSOL RESPIRATORY (INHALATION) at 21:27

## 2020-06-14 RX ADMIN — Medication 1200 MILLIGRAM(S): at 17:57

## 2020-06-14 RX ADMIN — Medication 81 MILLIGRAM(S): at 12:41

## 2020-06-14 RX ADMIN — Medication 20 MILLIGRAM(S): at 12:39

## 2020-06-14 RX ADMIN — CLOPIDOGREL BISULFATE 75 MILLIGRAM(S): 75 TABLET, FILM COATED ORAL at 12:41

## 2020-06-14 RX ADMIN — SENNA PLUS 2 TABLET(S): 8.6 TABLET ORAL at 21:28

## 2020-06-14 RX ADMIN — Medication 8 UNIT(S): at 21:28

## 2020-06-14 RX ADMIN — BUDESONIDE AND FORMOTEROL FUMARATE DIHYDRATE 2 PUFF(S): 160; 4.5 AEROSOL RESPIRATORY (INHALATION) at 08:29

## 2020-06-14 RX ADMIN — Medication 40 MILLIGRAM(S): at 05:28

## 2020-06-14 RX ADMIN — ENOXAPARIN SODIUM 40 MILLIGRAM(S): 100 INJECTION SUBCUTANEOUS at 12:41

## 2020-06-14 RX ADMIN — Medication 3: at 08:29

## 2020-06-14 RX ADMIN — Medication 6: at 12:40

## 2020-06-14 RX ADMIN — Medication 6: at 17:56

## 2020-06-14 RX ADMIN — ISOSORBIDE MONONITRATE 120 MILLIGRAM(S): 60 TABLET, EXTENDED RELEASE ORAL at 12:41

## 2020-06-14 NOTE — CHART NOTE - NSCHARTNOTEFT_GEN_A_CORE
Notified by RN that patient had an episode of 4 beats of v tach. Per RN patient is asymptomatic. Stat BMP done. Critical blood glucose of 517 on BMP. Stat finger stick done. / 400. Patient is currently on solumedrol 20 mg IV Q6 hours. Patient had received Levemir 15 unit and Humalog 3 unit for finger stick of 396 at bedtime. Humalog 4 unit Sq x1 ordered. Will repeat finger stick after 2 hours. Follow up with endocrine in AM to adjust insulin dosage.

## 2020-06-14 NOTE — PROGRESS NOTE ADULT - SUBJECTIVE AND OBJECTIVE BOX
CARDIOLOGY FOLLOW UP - Dr. Kothari    CC still c/o intermittent chest discomfort       PHYSICAL EXAM:  T(C): 36.9 (06-14-20 @ 05:20), Max: 36.9 (06-14-20 @ 05:20)  HR: 81 (06-14-20 @ 05:20) (76 - 81)  BP: 129/60 (06-14-20 @ 05:20) (129/60 - 136/75)  RR: 18 (06-14-20 @ 05:20) (18 - 18)  SpO2: 99% (06-14-20 @ 05:20) (99% - 100%)  Wt(kg): --  I&O's Summary    13 Jun 2020 07:01  -  14 Jun 2020 07:00  --------------------------------------------------------  IN: 1250 mL / OUT: 2620 mL / NET: -1370 mL    14 Jun 2020 07:01  -  14 Jun 2020 09:34  --------------------------------------------------------  IN: 120 mL / OUT: 750 mL / NET: -630 mL        Appearance: Normal	  Cardiovascular: Normal S1 S2,RRR, No JVD, No murmurs  Respiratory: Lungs clear to auscultation	  Gastrointestinal:  Soft, Non-tender, + BS	  Extremities: Normal range of motion, No clubbing, cyanosis or edema        MEDICATIONS  (STANDING):  artificial  tears Solution 1 Drop(s) Both EYES two times a day  aspirin enteric coated 81 milliGRAM(s) Oral daily  atorvastatin 80 milliGRAM(s) Oral at bedtime  budesonide 160 MICROgram(s)/formoterol 4.5 MICROgram(s) Inhaler 2 Puff(s) Inhalation two times a day  clopidogrel Tablet 75 milliGRAM(s) Oral daily  dextrose 5%. 1000 milliLiter(s) (50 mL/Hr) IV Continuous <Continuous>  dextrose 50% Injectable 12.5 Gram(s) IV Push once  dextrose 50% Injectable 25 Gram(s) IV Push once  dextrose 50% Injectable 25 Gram(s) IV Push once  enoxaparin Injectable 40 milliGRAM(s) SubCutaneous daily  furosemide   Injectable 40 milliGRAM(s) IV Push two times a day  insulin detemir injectable (LEVEMIR) 15 Unit(s) SubCutaneous at bedtime  insulin lispro (HumaLOG) corrective regimen sliding scale   SubCutaneous three times a day before meals  insulin lispro (HumaLOG) corrective regimen sliding scale   SubCutaneous at bedtime  isosorbide   mononitrate ER Tablet (IMDUR) 120 milliGRAM(s) Oral daily  methylPREDNISolone sodium succinate Injectable 20 milliGRAM(s) IV Push every 6 hours  senna 2 Tablet(s) Oral at bedtime  tamsulosin 0.4 milliGRAM(s) Oral at bedtime      TELEMETRY: NSR, pvc 	    ECG:  	  RADIOLOGY:   DIAGNOSTIC TESTING:  [ ] Echocardiogram: < from: TTE with Doppler (w/Cont) (06.13.20 @ 09:12) >  CONCLUSIONS:  1. There is posterior mitral annular calcification. Minimal  mitral regurgitation. The peak/mean transmitral gradients=  20/7mmHg (HR= 82bpm).  There is acceleration of flow into the valve.  There is  mild mitral stenosis.  By the continuity equation, the MVA=  1.75cm2.  2. Normal left ventricualar size.  Parasternal long axis  imaging is off axis and difficult precluding accurate  septal and posterior wall measurements.  There is a basal  sigmoid septum without obstruction.  3. The distal apex is severely hypokinetic/akinetic.  The  rest of the walls thicken normally.  The LVEF= 50-55%.  4. The estimated left atrial pressure is at least moderate  elevated.  5. Normal right ventricular size and function.  *** No previous Echo exam.    < end of copied text >    [ ]  Catheterization:  [ ] Stress Test:    OTHER: 	    LABS:	 	                                12.2   8.86  )-----------( 244      ( 14 Jun 2020 06:52 )             37.2     06-14    140  |  99  |  52<H>  ----------------------------<  294<H>  3.8   |  25  |  1.52<H>    Ca    9.4      14 Jun 2020 06:52  Phos  4.3     06-14  Mg     2.1     06-14    TPro  6.8  /  Alb  4.3  /  TBili  0.4  /  DBili  x   /  AST  22  /  ALT  27  /  AlkPhos  64  06-13    PT/INR - ( 12 Jun 2020 11:15 )   PT: 11.0 SEC;   INR: 0.96          PTT - ( 12 Jun 2020 11:15 )  PTT:28.3 SEC

## 2020-06-14 NOTE — PROVIDER CONTACT NOTE (OTHER) - SITUATION
Patient blood sugar 512. Patient appears to be in no distress. Patient denies symptoms of hyperglycemia, No dizziness.

## 2020-06-14 NOTE — PROGRESS NOTE ADULT - ASSESSMENT
70 y/o male, with a PmHx of HTN, COPD, CHF, HLD, DM, CABG, presented to the [facility] ED with SOB. Admitted to telemetry for acute on chronic chf exacerbation.    COPD exacerbation:  CHF exacerbation:   Obesity AUDI:  HTN, HLD  DM: CABG     he is wheezing : Starts steroids  cont symbicort:  Duoneb q 6 hours: Do d dimer: if high : cta chest otherwise ct chest without contrast  start bipap at night time: 10/5: 30 % fio2::   though he uses capa at home but he was hypercarbic on admission:  Controlled  Cont diuretics:  echo: : for cath eventually:   Hypercarbic resp failure: improved on bipap   DW Sesar      6/14:    feels better:    hyperglycemic:  decrease steroids:   likes the bipap n the night  for ischemia workup:  cont diuresis:  monitor renal fnction:s  cont tomonitor blood glcuse;\  DW NP

## 2020-06-14 NOTE — PROVIDER CONTACT NOTE (OTHER) - ACTION/TREATMENT ORDERED:
WILLIAM Webb made aware, will speak with team in regards to increasing standing dose of insulin. Will continue to monitor.

## 2020-06-14 NOTE — PROGRESS NOTE ADULT - ASSESSMENT
70 y/o male with poorly controlled T2Dm (A1c 7.8%) complicated with CABG and CHF here with CHF exacerbation. Noted to have hyperglycemia x/p 1 dose of solumedrol. Also with h/o nocturnal hypoglycemia at home on Levemir 20 units and Metformin 1000 mg BID. Also with h/o HTN and HLD. (high risk and high medical decision making).  Patient on steroids, solumedrol 20 mg IV q 6 hours, being tapered to q12 hours dosing today    T2DM  - target -180 mg/dl  - target a1c 7-8%  - Recommend consistent carb diet inpatient  - increase lantus to 19 units sq qhs  - start Humalog 3 units sq tid ac  - change correction scale to moderate Humalog scale for both AC and HS  - Recommend RD consultation  - As steroid taper effects wear off in next 24-48 hours, expect insulin requirements to decrease and adjust insulin doses accordingly to reduce risk of hypoglycemia.      For outpatient  - dc plan depends on steroid plan at discharge.  PLease clarify plan   - it was previously discussed in consult by our team, the possibility of using Metformin (if EF is normal and kidney function is healthy) with Januvia or Jardiance (if covered) and stopping Levemir but patient wants to hold off on starting new medication and stopping insulin immediately. He would prefer to continue Levemir and Metformin 1000 mg BID and provide our recommendation to his endocrinologist at [facility] and then decide to change his regimen.    - D/C plan is likely Metformin and Levemir if no contraindications  - Goal A1c is 7% without hypoglycemia  - F/u with endocrinologist at [facility]    HTN   - Recommend continue current anti-hypertensive and     HLD  Recommend statin medications as per cardiology recommendations

## 2020-06-14 NOTE — PROGRESS NOTE ADULT - ASSESSMENT
Patient is a 69y Male with HTN, COPD, CHF, HLD, DM, CABG p/w SOB. Pt a/w acute on chronic CHF. Nephrology consulted for Elevated serum creatinine.    1. ELMER- previous SCr 1-1.1 in 2019. Non oliguric ELMER in the setting of CHF; renal function worsened likely due to osmotic diuresis due to hyperglycemia with additional Lasix 40mg IV bid. Will decrease Lasix to 40mg IV qd. Continue to hold Losartan. TTE with distal apex severe hypokinesis/ akinesis; plan for cath 6/15. Will hold Lasix 6/15 am and give Mucomyst 1200mg PO bid (2 doses prior to cath and 2 doses post cath). UA with trace blood and neg protein, Check urine lytes and renal US. Strict I/Os. Avoid nephrotoxins/ NSAIDs/ RCA. Monitor BMP.    2. HTN- BP acceptable. Continue to hold Losartan due to ELMER.  Monitor BP    3. Acute on chronic CHF- plan as per Cardiology.     4. Hyperkalemia- resolved with medical management. Monitor lytes.

## 2020-06-14 NOTE — PROGRESS NOTE ADULT - SUBJECTIVE AND OBJECTIVE BOX
Patient is a 69y old  Male who presents with a chief complaint of Shortness of Breath (2020 14:29)      SUBJECTIVE / OVERNIGHT EVENTS:  sugars high due to steroids  breathing better, no more wheezing  intermittent chest pain, now resolved  cath tomorrow pending.   no cp, no sob, no n/v/d. no abdominal pain.  no headache, no dizziness.   tele no events       Vital Signs Last 24 Hrs  T(C): 36.3 (2020 12:37), Max: 36.9 (2020 05:20)  T(F): 97.3 (2020 12:37), Max: 98.4 (2020 05:20)  HR: 97 (2020 12:37) (81 - 97)  BP: 160/81 (2020 12:37) (129/60 - 160/81)  BP(mean): --  RR: 18 (2020 05:20) (18 - 18)  SpO2: 100% (2020 12:37) (99% - 100%)  I&O's Summary    2020 07:01  -  2020 07:00  --------------------------------------------------------  IN: 1250 mL / OUT: 2620 mL / NET: -1370 mL    2020 07:01  -  2020 19:51  --------------------------------------------------------  IN: 420 mL / OUT: 2075 mL / NET: -1655 mL        PHYSICAL EXAM:  GENERAL: NAD, Comfortable, now on room air  HEAD:  Atraumatic, Normocephalic  EYES: EOMI, PERRLA, conjunctiva and sclera clear  NECK: Supple, No JVD  CHEST/LUNG: mild decrease breath sounds bilaterally, mild basilar crackles; No wheeze   HEART: Regular rate and rhythm; No murmurs, rubs, or gallops  ABDOMEN: Soft, Nontender, Nondistended; Bowel sounds present  Neuro: AAO x 3, no focal deficit, 5/5 b/l extremities  EXTREMITIES:  2+ Peripheral Pulses, No clubbing, cyanosis, +trace edema  SKIN: No rashes or lesions      LABS:                        12.2   8.86  )-----------( 244      ( 2020 06:52 )             37.2     06-14    140  |  99  |  52<H>  ----------------------------<  294<H>  3.8   |  25  |  1.52<H>    Ca    9.4      2020 06:52  Phos  4.3     06-14  Mg     2.1     06-14    TPro  6.8  /  Alb  4.3  /  TBili  0.4  /  DBili  x   /  AST  22  /  ALT  27  /  AlkPhos  64  06-13      CAPILLARY BLOOD GLUCOSE      POCT Blood Glucose.: 512 mg/dL (2020 17:39)  POCT Blood Glucose.: 530 mg/dL (2020 17:34)  POCT Blood Glucose.: 491 mg/dL (2020 17:32)  POCT Blood Glucose.: 415 mg/dL (2020 12:28)  POCT Blood Glucose.: 428 mg/dL (2020 12:26)  POCT Blood Glucose.: 280 mg/dL (2020 08:19)  POCT Blood Glucose.: 295 mg/dL (2020 03:34)  POCT Blood Glucose.: 400 mg/dL (2020 01:18)  POCT Blood Glucose.: 429 mg/dL (2020 01:17)  POCT Blood Glucose.: 396 mg/dL (2020 21:37)        Urinalysis Basic - ( 2020 08:09 )    Color: COLORLESS / Appearance: CLEAR / S.008 / pH: 6.0  Gluc: 500 / Ketone: NEGATIVE  / Bili: NEGATIVE / Urobili: NORMAL   Blood: TRACE / Protein: NEGATIVE / Nitrite: NEGATIVE   Leuk Esterase: NEGATIVE / RBC: x / WBC x   Sq Epi: x / Non Sq Epi: x / Bacteria: x        RADIOLOGY & ADDITIONAL TESTS:    Imaging Personally Reviewed:  [x] YES  [ ] NO    Consultant(s) Notes Reviewed:  [x] YES  [ ] NO      MEDICATIONS  (STANDING):  acetylcysteine  Oral Solution 1200 milliGRAM(s) Oral <User Schedule>  artificial  tears Solution 1 Drop(s) Both EYES two times a day  aspirin enteric coated 81 milliGRAM(s) Oral daily  atorvastatin 80 milliGRAM(s) Oral at bedtime  budesonide 160 MICROgram(s)/formoterol 4.5 MICROgram(s) Inhaler 2 Puff(s) Inhalation two times a day  clopidogrel Tablet 75 milliGRAM(s) Oral daily  dextrose 5%. 1000 milliLiter(s) (50 mL/Hr) IV Continuous <Continuous>  dextrose 50% Injectable 12.5 Gram(s) IV Push once  dextrose 50% Injectable 25 Gram(s) IV Push once  dextrose 50% Injectable 25 Gram(s) IV Push once  enoxaparin Injectable 40 milliGRAM(s) SubCutaneous daily  insulin detemir injectable (LEVEMIR) 19 Unit(s) SubCutaneous at bedtime  insulin lispro (HumaLOG) corrective regimen sliding scale   SubCutaneous three times a day before meals  insulin lispro (HumaLOG) corrective regimen sliding scale   SubCutaneous at bedtime  insulin lispro Injectable (HumaLOG) 6 Unit(s) SubCutaneous three times a day before meals  isosorbide   mononitrate ER Tablet (IMDUR) 120 milliGRAM(s) Oral daily  senna 2 Tablet(s) Oral at bedtime  tamsulosin 0.4 milliGRAM(s) Oral at bedtime    MEDICATIONS  (PRN):  acetaminophen   Tablet .. 650 milliGRAM(s) Oral every 6 hours PRN Temp greater or equal to 38C (100.4F), Mild Pain (1 - 3), Moderate Pain (4 - 6)  albuterol/ipratropium for Nebulization 3 milliLiter(s) Nebulizer every 6 hours PRN SOB  dextrose 40% Gel 15 Gram(s) Oral once PRN Blood Glucose LESS THAN 70 milliGRAM(s)/deciliter  glucagon  Injectable 1 milliGRAM(s) IntraMuscular once PRN Glucose LESS THAN 70 milligrams/deciliter  nitroglycerin     SubLingual 0.4 milliGRAM(s) SubLingual every 5 minutes PRN Chest Pain  polyethylene glycol 3350 17 Gram(s) Oral daily PRN Constipation      Care Discussed with Consultants/Other Providers [x] YES  [ ] NO    HEALTH ISSUES - PROBLEM Dx:  Need for prophylactic measure: Need for prophylactic measure  HLD (hyperlipidemia): HLD (hyperlipidemia)  HTN (hypertension): HTN (hypertension)  BPH (benign prostatic hyperplasia): BPH (benign prostatic hyperplasia)  Chronic obstructive pulmonary disease, unspecified COPD type: Chronic obstructive pulmonary disease, unspecified COPD type  Diabetes mellitus: Diabetes mellitus  Acute on chronic heart failure: Acute on chronic heart failure

## 2020-06-14 NOTE — PROVIDER CONTACT NOTE (CRITICAL VALUE NOTIFICATION) - ACTION/TREATMENT ORDERED:
Fingerstick repeated 2x, Provider made aware of results. Provider put in a one time order for insuline to be rechecked in 2 hours.

## 2020-06-14 NOTE — PROVIDER CONTACT NOTE (OTHER) - SITUATION
Patient blood sugar 415. Patient does not appear to be in any form of distress. Patient denies symptoms of hyperglycemia, No dizziness.

## 2020-06-14 NOTE — PROGRESS NOTE ADULT - SUBJECTIVE AND OBJECTIVE BOX
[facility] NEPHROLOGY- PROGRESS NOTE    Patient is a 69y Male with HTN, COPD, CHF, HLD, DM, CABG p/w SOB. Pt a/w acute on chronic CHF. Nephrology consulted for Elevated serum creatinine.    Pt with 4 beats of Vtach O/N and uncontrolled FS.     Hospital Medications: Medications reviewed.  REVIEW OF SYSTEMS:  CONSTITUTIONAL: No fevers or chills  RESPIRATORY: + shortness of breath on exertion  CARDIOVASCULAR: + chest discomfort  GASTROINTESTINAL: No nausea, vomiting, diarrhea or abdominal pain.   VASCULAR: No bilateral lower extremity edema.     VITALS:  T(F): 98.4 (20 @ 05:20), Max: 98.4 (20 @ 05:20)  HR: 81 (20 @ 05:20)  BP: 129/60 (20 @ 05:20)  RR: 18 (20 @ 05:20)  SpO2: 99% (20 @ 05:20)  Wt(kg): --  Height (cm): 167.64 ( @ 17:53)  Weight (kg): 82 ( @ 17:53)  BMI (kg/m2): 29.2 ( @ 17:53)  BSA (m2): 1.92 ( @ 17:53)    13 @ 07:  -   @ 07:00  --------------------------------------------------------  IN: 1250 mL / OUT: 2620 mL / NET: -1370 mL     @ 07:01  -   @ 10:07  --------------------------------------------------------  IN: 120 mL / OUT: 750 mL / NET: -630 mL      PHYSICAL EXAM:  Constitutional: NAD  Neurological: Awake and Alert  HEENT: anicteric sclera,   Respiratory: CTA b/l  Cardiovascular: S1, S2, RRR  Gastrointestinal: BS+, soft, NT/ND  : No araiza.  Extremities: No peripheral edema    LABS:      140  |  99  |  52<H>  ----------------------------<  294<H>  3.8   |  25  |  1.52<H>    Ca    9.4      2020 06:52  Phos  4.3       Mg     2.1         TPro  6.8  /  Alb  4.3  /  TBili  0.4  /  DBili      /  AST  22  /  ALT  27  /  AlkPhos  64      Creatinine Trend: 1.52 <--, 1.79 <--, 1.37 <--, 1.48 <--, 1.39 <--, 1.31 <--                        12.2   8.86  )-----------( 244      ( 2020 06:52 )             37.2     Urine Studies:  Urinalysis Basic - ( 2020 08:09 )    Color: COLORLESS / Appearance: CLEAR / S.008 / pH: 6.0  Gluc: 500 / Ketone: NEGATIVE  / Bili: NEGATIVE / Urobili: NORMAL   Blood: TRACE / Protein: NEGATIVE / Nitrite: NEGATIVE   Leuk Esterase: NEGATIVE / RBC:  / WBC    Sq Epi:  / Non Sq Epi:  / Bacteria:         RADIOLOGY & ADDITIONAL STUDIES:      < from: TTE with Doppler (w/Cont) (20 @ 09:12) >    Patient name: BRANDON CAMARILLO  YOB: 1950   Age: 69 (M)   MR#: [id number]  Study Date: 2020    < end of copied text >  < from: TTE with Doppler (w/Cont) (20 @ 09:12) >  PROCEDURE: Transthoracic echocardiogram with 2-D, M-Mode  and complete spectral andcolor flow Doppler.    < end of copied text >  < from: TTE with Doppler (w/Cont) (20 @ 09:12) >  CONCLUSIONS:  1. There is posterior mitral annular calcification. Minimal  mitral regurgitation. The peak/mean transmitral gradients=  20/7mmHg (HR= 82bpm).  There is acceleration of flow into the valve.  There is  mild mitral stenosis.  By the continuity equation, the MVA=  1.75cm2.  2. Normal left ventricualar size.  Parasternal long axis  imaging is off axis and difficult precluding accurate  septal and posterior wall measurements.  There is a basal  sigmoid septum without obstruction.  3. The distal apex is severely hypokinetic/akinetic.  The  rest of the walls thicken normally.  The LVEF= 50-55%.  4. The estimated left atrial pressure is at least moderate  elevated.  5. Normal right ventricular size and function.  *** No previous Echo exam.    < end of copied text >

## 2020-06-14 NOTE — CHART NOTE - NSCHARTNOTEFT_GEN_A_CORE
Source: Patient [X]    Family [ ]     other [X] chart  Unable to conduct face-to-face interview or nutrition-focused physical exam due to COVID19 contact precautions to mitigate spread of coronavirus. Noted covid negative 6/12/20. RD spoke with patient via phone.    Diet : Diet, Regular:   Consistent Carbohydrate {Evening Snack} (CSTCHOSN)  DASH/TLC {Sodium & Cholesterol Restricted} (DASH)  Low Fat (LOWFAT)  1500mL Fluid Restriction ([id number])  Low Sodium  Lacto-Ovo Veg (Accepts Milk Prod., Eggs)     Special Instructions for Nursing:  Low Sodium (06-13-20 @ 21:17)    Nutrition Consult for Assessment received.   Patient is a 69 y/o male with PMHx of HTN, T2DM, CAD s/p CABG 09/2014 & GUILLE to ostial (09/2015), % with patent grafts (with patent LIMA to LAD and patent SVG to diag), recent left Heart cath with nonobstructive disease in January 2019, presenting with worsening SOB for several days, +cough. He reports orthopnea and worsening LE swelling. Admitted to [facility] for acute on chronic CHF exacerbation. Per cardiologist, plan for cardiac cath tomorrow AM. Per chart review, pt had elevated FSBG overnight 429->400->295->280. Noted patient is on steroid rx which may aggravate glucose levels. Endocrinologist is following. Patient is currently covered with Consistent Carbohydrate diet, levemir, and lispro. Patient continues to have good PO intake at meals at this time. No N/V/C/D reported, last BM 6/12 per nursing flowsheet. No chewing/swallowing difficulties noted. Patient is lacto-ovo vegetarian as per diet order.     Per initial nutrition assessment completed yesterday 6/13/20, RDN had reviewed portion control and distribution of carbohydrate foods throughout the day, general healthful eating and basic hearth healthy (DASH/TLC) diet principles, and also discussed the importance of lifestyle changes, low sodium meal preparation, glycemic control and fluid restriction (1500 ml). Pt reported good understanding of education given. Patient has no questions about diet today.       Current Weight: Weight (kg): 84.3kg (6/14), 86.2kg (6/13)  -- wt loss of 1.9kg/2.2% x 1 day, noted patient is on diuretic rx and fluid restriction 1.5L/d    Pertinent Medications: MEDICATIONS  (STANDING):  acetylcysteine  Oral Solution 1200 milliGRAM(s) Oral <User Schedule>  artificial  tears Solution 1 Drop(s) Both EYES two times a day  aspirin enteric coated 81 milliGRAM(s) Oral daily  atorvastatin 80 milliGRAM(s) Oral at bedtime  budesonide 160 MICROgram(s)/formoterol 4.5 MICROgram(s) Inhaler 2 Puff(s) Inhalation two times a day  clopidogrel Tablet 75 milliGRAM(s) Oral daily  dextrose 5%. 1000 milliLiter(s) (50 mL/Hr) IV Continuous <Continuous>  dextrose 50% Injectable 12.5 Gram(s) IV Push once  dextrose 50% Injectable 25 Gram(s) IV Push once  dextrose 50% Injectable 25 Gram(s) IV Push once  enoxaparin Injectable 40 milliGRAM(s) SubCutaneous daily  insulin detemir injectable (LEVEMIR) 19 Unit(s) SubCutaneous at bedtime  insulin lispro (HumaLOG) corrective regimen sliding scale   SubCutaneous three times a day before meals  insulin lispro (HumaLOG) corrective regimen sliding scale   SubCutaneous at bedtime  insulin lispro Injectable (HumaLOG) 3 Unit(s) SubCutaneous three times a day before meals  isosorbide   mononitrate ER Tablet (IMDUR) 120 milliGRAM(s) Oral daily  methylPREDNISolone sodium succinate Injectable 20 milliGRAM(s) IV Push every 6 hours  senna 2 Tablet(s) Oral at bedtime  tamsulosin 0.4 milliGRAM(s) Oral at bedtime    MEDICATIONS  (PRN):  acetaminophen   Tablet .. 650 milliGRAM(s) Oral every 6 hours PRN Temp greater or equal to 38C (100.4F), Mild Pain (1 - 3), Moderate Pain (4 - 6)  albuterol/ipratropium for Nebulization 3 milliLiter(s) Nebulizer every 6 hours PRN SOB  dextrose 40% Gel 15 Gram(s) Oral once PRN Blood Glucose LESS THAN 70 milliGRAM(s)/deciliter  glucagon  Injectable 1 milliGRAM(s) IntraMuscular once PRN Glucose LESS THAN 70 milligrams/deciliter  nitroglycerin     SubLingual 0.4 milliGRAM(s) SubLingual every 5 minutes PRN Chest Pain  polyethylene glycol 3350 17 Gram(s) Oral daily PRN Constipation    Pertinent Labs:  06-14 Na140 mmol/L Glu 294 mg/dL<H> K+ 3.8 mmol/L Cr  1.52 mg/dL<H> BUN 52 mg/dL<H> 06-14 Phos 4.3 mg/dL 06-13 Alb 4.3 g/dL 06-13 Chol 115 mg/dL<L> LDL 50 mg/dL HDL 60 mg/dL<H> Trig 47 mg/dL      Skin: intact.    Edema: none noted per flowsheet.      Estimated Needs:   [X] no change since previous assessment  [ ] recalculated:       Previous Nutrition Diagnosis: Altered Nutrition Related Lab Values      Nutrition Diagnosis is [X] ongoing  [ ] resolved [ ] not applicable          Recommendations:     1. Change diet to Consistent Carbohydrate No Snack, DASH (cholesterol & Na restricted), Low Fat diet.   2. Defer fluid restriction to medical team.  3. Lacto-Ovo veg per pt's preference.   4. Reinforce diet education with patient as needed.  5. RD remains available and will f/u per protocols. Please re-consult as needed.     Monitoring and Evaluation:     [X] PO intake [X] Tolerance to diet prescription [x] daily weights [X] labs [X] follow up per protocol    [ ] other:    -- RD paged PA/team, pending response.

## 2020-06-14 NOTE — PROGRESS NOTE ADULT - ASSESSMENT
67 y/o male with PMHx of HTN, T2DM, CAD s/p CABG 09/2014 & GUILLE to ostial (09/2015), % with patent grafts (with patent LIMA to LAD and patent SVG to diag), recent left Heart cath with nonobstructive disease in January 2019, presenting with worsening SOB for several days, +cough. He reports orthopnea  and worsening LE swelling.      # Dyspnea likely acute on chronic heart failure in the setting of volume overload with bibasilar crackles  s/p Lasix 40 x1 in ED  diurese per cardiology, currently on Lasix IV 40 mg BID  TTE  LVEF= 50-55%.  tele monitoring: no events  weaned off bipap, now on nasal canula to room air  stricts I/O, daily weights  no sick contact, doubt COVID19, nasal swab neg  CXR no evidence of PNA. monitor off abx.   started Solumedrol IV by pulm for COPD exacerbation. tapered to PO given hyperglycemia.  c/w home meds: inhalers. duoneb PRN.   mild leukocytosis resolved.   neg blood cx, U/A, CXR, COVID PCR    # hx of CAD s/p CABG  cardio eval, resume home meds: Asa/Plavix/statin/betablocker  TTE as above  trend trops, likely demand ischemia  recent left Heart cath with nonobstructive disease   Plan for [facility] tomorrow given intermittent chest pain     # Acute kidney failure  likely in the setting of cardio renal etiology.   r/o obstructive etiology. check post void bladder scan.   trend Cr, holding Losartan per renal  monitor urine outpt  avoid nephrotoxic agents    #Diabetes:   hgbA1C 7.8%, start ISS  he takes Levemir 20 units QHS and metformin  sugars high due to IV steroids now tapered  Will increase premeal insulin to humalog 6 units TID. c/w Lantus 19 units QHS  Endo consult appreciated.    DVT ppx    - Dr. DAVIE Parret ([employer])  - [phone]

## 2020-06-14 NOTE — PROGRESS NOTE ADULT - SUBJECTIVE AND OBJECTIVE BOX
Patient is a 69y old  Male who presents with a chief complaint of Shortness of Breath (2020 10:06)      Any change in ROS: breathing is better:     MEDICATIONS  (STANDING):  acetylcysteine  Oral Solution 1200 milliGRAM(s) Oral <User Schedule>  artificial  tears Solution 1 Drop(s) Both EYES two times a day  aspirin enteric coated 81 milliGRAM(s) Oral daily  atorvastatin 80 milliGRAM(s) Oral at bedtime  budesonide 160 MICROgram(s)/formoterol 4.5 MICROgram(s) Inhaler 2 Puff(s) Inhalation two times a day  clopidogrel Tablet 75 milliGRAM(s) Oral daily  dextrose 5%. 1000 milliLiter(s) (50 mL/Hr) IV Continuous <Continuous>  dextrose 50% Injectable 12.5 Gram(s) IV Push once  dextrose 50% Injectable 25 Gram(s) IV Push once  dextrose 50% Injectable 25 Gram(s) IV Push once  enoxaparin Injectable 40 milliGRAM(s) SubCutaneous daily  insulin detemir injectable (LEVEMIR) 19 Unit(s) SubCutaneous at bedtime  insulin lispro (HumaLOG) corrective regimen sliding scale   SubCutaneous three times a day before meals  insulin lispro (HumaLOG) corrective regimen sliding scale   SubCutaneous at bedtime  insulin lispro Injectable (HumaLOG) 3 Unit(s) SubCutaneous three times a day before meals  isosorbide   mononitrate ER Tablet (IMDUR) 120 milliGRAM(s) Oral daily  methylPREDNISolone sodium succinate Injectable 20 milliGRAM(s) IV Push every 6 hours  senna 2 Tablet(s) Oral at bedtime  tamsulosin 0.4 milliGRAM(s) Oral at bedtime    MEDICATIONS  (PRN):  acetaminophen   Tablet .. 650 milliGRAM(s) Oral every 6 hours PRN Temp greater or equal to 38C (100.4F), Mild Pain (1 - 3), Moderate Pain (4 - 6)  albuterol/ipratropium for Nebulization 3 milliLiter(s) Nebulizer every 6 hours PRN SOB  dextrose 40% Gel 15 Gram(s) Oral once PRN Blood Glucose LESS THAN 70 milliGRAM(s)/deciliter  glucagon  Injectable 1 milliGRAM(s) IntraMuscular once PRN Glucose LESS THAN 70 milligrams/deciliter  nitroglycerin     SubLingual 0.4 milliGRAM(s) SubLingual every 5 minutes PRN Chest Pain  polyethylene glycol 3350 17 Gram(s) Oral daily PRN Constipation    Vital Signs Last 24 Hrs  T(C): 36.3 (2020 12:37), Max: 36.9 (2020 05:20)  T(F): 97.3 (2020 12:37), Max: 98.4 (2020 05:20)  HR: 97 (2020 12:37) (76 - 97)  BP: 160/81 (2020 12:37) (129/60 - 160/81)  BP(mean): --  RR: 18 (2020 05:20) (18 - 18)  SpO2: 100% (2020 12:37) (99% - 100%)    I&O's Summary    2020 07:01  -  2020 07:00  --------------------------------------------------------  IN: 1250 mL / OUT: 2620 mL / NET: -1370 mL    2020 07:01  -  2020 13:09  --------------------------------------------------------  IN: 240 mL / OUT: 1025 mL / NET: -785 mL          Physical Exam:   GENERAL: NAD, well-groomed, well-developed  HEENT: KAZ/   Atraumatic, Normocephalic  ENMT: No tonsillar erythema, exudates, or enlargement; Moist mucous membranes, Good dentition, No lesions  NECK: Supple, No JVD, Normal thyroid  CHEST/LUNG: coarse rhonchi+  CVS: Regular rate and rhythm; No murmurs, rubs, or gallops  GI: : Soft, Nontender, Nondistended; Bowel sounds present  NERVOUS SYSTEM:  Alert & Oriented X3  EXTREMITIES:  2+ Peripheral Pulses, No clubbing, cyanosis, or edema  LYMPH: No lymphadenopathy noted  SKIN: No rashes or lesions  ENDOCRINOLOGY: No Thyromegaly  PSYCH: Appropriate    Labs:  23, 21, 20                            12.2   8.86  )-----------( 244      ( 2020 06:52 )             37.2                         11.3   9.60  )-----------( 225      ( 2020 06:36 )             34.8                         11.4   11.03 )-----------( 246      ( 2020 11:15 )             36.3     06-14    140  |  99  |  52<H>  ----------------------------<  294<H>  3.8   |  25  |  1.52<H>  06-13    137  |  96<L>  |  53<H>  ----------------------------<  517<HH>  4.5   |  24  |  1.79<H>  06-13    135  |  95<L>  |  32<H>  ----------------------------<  191<H>  3.9   |  24  |  1.37<H>  06-13    131<L>  |  92<L>  |  34<H>  ----------------------------<  551<HH>  4.3   |  22  |  1.48<H>  06-12    132<L>  |  96<L>  |  29<H>  ----------------------------<  204<H>  5.4<H>   |  18<L>  |  1.39<H>  06-12    132<L>  |  96<L>  |  28<H>  ----------------------------<  260<H>  5.4<H>   |  19<L>  |  1.31<H>    Ca    9.4      2020 06:52  Ca    9.7      2020 23:58  Ca    9.6      2020 06:36  Ca    9.5      2020 01:37  Ca    9.3      2020 15:00  Phos  4.3     06-14  Phos  3.6     06-13  Phos  2.8     06-12  Mg     2.1     06-14  Mg     2.1     06-13  Mg     2.1     06-12    TPro  6.8  /  Alb  4.3  /  TBili  0.4  /  DBili  x   /  AST  22  /  ALT  27  /  AlkPhos  64  06-13  TPro  7.6  /  Alb  4.5  /  TBili  0.6  /  DBili  x   /  AST  34  /  ALT  33  /  AlkPhos  63  06-12    CAPILLARY BLOOD GLUCOSE      POCT Blood Glucose.: 415 mg/dL (2020 12:28)  POCT Blood Glucose.: 428 mg/dL (2020 12:26)  POCT Blood Glucose.: 280 mg/dL (2020 08:19)  POCT Blood Glucose.: 295 mg/dL (2020 03:34)  POCT Blood Glucose.: 400 mg/dL (2020 01:18)  POCT Blood Glucose.: 429 mg/dL (2020 01:17)  POCT Blood Glucose.: 396 mg/dL (2020 21:37)  POCT Blood Glucose.: 177 mg/dL (2020 17:37)      LIVER FUNCTIONS - ( 2020 06:36 )  Alb: 4.3 g/dL / Pro: 6.8 g/dL / ALK PHOS: 64 u/L / ALT: 27 u/L / AST: 22 u/L / GGT: x             Urinalysis Basic - ( 2020 08:09 )    Color: COLORLESS / Appearance: CLEAR / S.008 / pH: 6.0  Gluc: 500 / Ketone: NEGATIVE  / Bili: NEGATIVE / Urobili: NORMAL   Blood: TRACE / Protein: NEGATIVE / Nitrite: NEGATIVE   Leuk Esterase: NEGATIVE / RBC: x / WBC x   Sq Epi: x / Non Sq Epi: x / Bacteria: x      D-Dimer Assay, Quantitative: < 150 ng/mL ( @ 19:30)  Serum Pro-Brain Natriuretic Peptide: 4597 pg/mL ( @ 06:52)  Serum Pro-Brain Natriuretic Peptide: 2523 pg/mL ( @ 11:15)        RECENT CULTURES:        RESPIRATORY CULTURES:        < from: US Duplex Venous Lower Ext Ltd, Right (06.13.20 @ 09:18) >    EXAM:  US DPLX LWR EXT VEINS LTD RT        PROCEDURE DATE:  2020         INTERPRETATION:  CLINICAL INFORMATION: Right lower extremity edema    COMPARISON: None available.    TECHNIQUE: Duplex sonography of the RIGHT LOWER extremity veins with color and spectral Doppler, with and without compression.      FINDINGS:  There is normal compressibility of the right common femoral, femoral and popliteal veins.   The contralateral common femoral vein is patent.  Doppler examination shows normal spontaneous and phasic flow.    No calf vein thrombosis is detected.    IMPRESSION:   No evidence of right lower extremity deep venous thrombosis.                          PAOLO AKHTAR M.D., RADIOLOGY RESIDENT  This document has been electronically signed.  MYKE SOLER M.D., ATTENDING RADIOLOGIST  This document has been electronically signed. 2020  9:35AM                < end of copied text >    Studies  Chest X-RAY  CT SCAN Chest   Venous Dopplers: LE:   CT Abdomen  Others

## 2020-06-14 NOTE — PROGRESS NOTE ADULT - ASSESSMENT
70 y/o male, with a PmHx of HTN, COPD, CHF, HLD, DM, CABG, presented to the [facility] ED with SOB. Admitted to telemetry for acute on chronic chf exacerbation.    1. CHF exacerbation  -echo showing distal apex severely hypokinetic/akinetic, LVEF 50-55%  -cont IV lasix    2. CAD s/p CABG  plan for cardiac cath tomorrow am, R/B/A discussed with patient, patient agreeable to proceed   cont dap  repeat covid test today. initial negative 6/12    3. HTN  -cont current meds    4. DM  -mgmt per medicine    DVT ppx

## 2020-06-14 NOTE — PROGRESS NOTE ADULT - SUBJECTIVE AND OBJECTIVE BOX
Chief Complaint: type 2 dm exacerbated by steroids    History:  denies n/c.  tolerates po.  FS elevated to 400s overnight. On solumedrol 20 mg iv q 6 h changed today to q 12 hours    MEDICATIONS  (STANDING):  acetylcysteine  Oral Solution 1200 milliGRAM(s) Oral <User Schedule>  artificial  tears Solution 1 Drop(s) Both EYES two times a day  aspirin enteric coated 81 milliGRAM(s) Oral daily  atorvastatin 80 milliGRAM(s) Oral at bedtime  budesonide 160 MICROgram(s)/formoterol 4.5 MICROgram(s) Inhaler 2 Puff(s) Inhalation two times a day  clopidogrel Tablet 75 milliGRAM(s) Oral daily  dextrose 5%. 1000 milliLiter(s) (50 mL/Hr) IV Continuous <Continuous>  dextrose 50% Injectable 12.5 Gram(s) IV Push once  dextrose 50% Injectable 25 Gram(s) IV Push once  dextrose 50% Injectable 25 Gram(s) IV Push once  enoxaparin Injectable 40 milliGRAM(s) SubCutaneous daily  insulin detemir injectable (LEVEMIR) 19 Unit(s) SubCutaneous at bedtime  insulin lispro (HumaLOG) corrective regimen sliding scale   SubCutaneous three times a day before meals  insulin lispro (HumaLOG) corrective regimen sliding scale   SubCutaneous at bedtime  insulin lispro Injectable (HumaLOG) 3 Unit(s) SubCutaneous three times a day before meals  isosorbide   mononitrate ER Tablet (IMDUR) 120 milliGRAM(s) Oral daily  methylPREDNISolone sodium succinate Injectable 20 milliGRAM(s) IV Push every 12 hours  senna 2 Tablet(s) Oral at bedtime  tamsulosin 0.4 milliGRAM(s) Oral at bedtime    MEDICATIONS  (PRN):  acetaminophen   Tablet .. 650 milliGRAM(s) Oral every 6 hours PRN Temp greater or equal to 38C (100.4F), Mild Pain (1 - 3), Moderate Pain (4 - 6)  albuterol/ipratropium for Nebulization 3 milliLiter(s) Nebulizer every 6 hours PRN SOB  dextrose 40% Gel 15 Gram(s) Oral once PRN Blood Glucose LESS THAN 70 milliGRAM(s)/deciliter  glucagon  Injectable 1 milliGRAM(s) IntraMuscular once PRN Glucose LESS THAN 70 milligrams/deciliter  nitroglycerin     SubLingual 0.4 milliGRAM(s) SubLingual every 5 minutes PRN Chest Pain  polyethylene glycol 3350 17 Gram(s) Oral daily PRN Constipation      Allergies    No Known Allergies    Intolerances      Review of Systems:  Constitutional: No fever  Eyes: No blurry vision  ALL OTHER SYSTEMS REVIEWED AND NEGATIVE        PHYSICAL EXAM:  Vital Signs Last 24 Hrs  T(C): 36.3 (14 Jun 2020 12:37), Max: 36.9 (14 Jun 2020 05:20)  T(F): 97.3 (14 Jun 2020 12:37), Max: 98.4 (14 Jun 2020 05:20)  HR: 97 (14 Jun 2020 12:37) (76 - 97)  BP: 160/81 (14 Jun 2020 12:37) (129/60 - 160/81)  BP(mean): --  RR: 18 (14 Jun 2020 05:20) (18 - 18)  SpO2: 100% (14 Jun 2020 12:37) (99% - 100%)  GENERAL: NAD, well-groomed, well-developed  GI: Soft, nontender, non distended  SKIN: Dry, intact, No rashes or lesions  PSYCH: Alert and oriented x 3, normal affect, normal mood      CAPILLARY BLOOD GLUCOSE    POCT Blood Glucose.: 415 mg/dL (14 Jun 2020 12:28)  POCT Blood Glucose.: 428 mg/dL (14 Jun 2020 12:26)  POCT Blood Glucose.: 280 mg/dL (14 Jun 2020 08:19)  POCT Blood Glucose.: 295 mg/dL (14 Jun 2020 03:34)  POCT Blood Glucose.: 400 mg/dL (14 Jun 2020 01:18)  POCT Blood Glucose.: 429 mg/dL (14 Jun 2020 01:17)  POCT Blood Glucose.: 396 mg/dL (13 Jun 2020 21:37)  POCT Blood Glucose.: 177 mg/dL (13 Jun 2020 17:37)      06-14    140  |  99  |  52<H>  ----------------------------<  294<H>  3.8   |  25  |  1.52<H>    EGFR if : 53  EGFR if non : 45    Ca    9.4      06-14  Mg     2.1     06-14  Phos  4.3     06-14    TPro  6.8  /  Alb  4.3  /  TBili  0.4  /  DBili  x   /  AST  22  /  ALT  27  /  AlkPhos  64  06-13    A1C with Estimated Average Glucose (06.13.20 @ 06:36)    A1C with Estimated Average Glucose: 7.8%          Thyroid Function Tests:  06-12 @ 15:00 TSH 1.05 FreeT4 -- T3 -- Anti TPO -- Anti Thyroglobulin Ab -- TSI --

## 2020-06-15 ENCOUNTER — APPOINTMENT (OUTPATIENT)
Dept: CARDIOLOGY | Facility: HOSPITAL | Age: 70
End: 2020-06-15
Payer: MEDICAID

## 2020-06-15 LAB
ANION GAP SERPL CALC-SCNC: 12 MMO/L — SIGNIFICANT CHANGE UP (ref 7–14)
BUN SERPL-MCNC: 49 MG/DL — HIGH (ref 7–23)
CALCIUM SERPL-MCNC: 9.2 MG/DL — SIGNIFICANT CHANGE UP (ref 8.4–10.5)
CHLORIDE SERPL-SCNC: 104 MMOL/L — SIGNIFICANT CHANGE UP (ref 98–107)
CO2 SERPL-SCNC: 26 MMOL/L — SIGNIFICANT CHANGE UP (ref 22–31)
CREAT SERPL-MCNC: 1.09 MG/DL — SIGNIFICANT CHANGE UP (ref 0.5–1.3)
GLUCOSE SERPL-MCNC: 228 MG/DL — HIGH (ref 70–99)
HCT VFR BLD CALC: 37.9 % — LOW (ref 39–50)
HGB BLD-MCNC: 12.5 G/DL — LOW (ref 13–17)
MAGNESIUM SERPL-MCNC: 2.2 MG/DL — SIGNIFICANT CHANGE UP (ref 1.6–2.6)
MCHC RBC-ENTMCNC: 28.7 PG — SIGNIFICANT CHANGE UP (ref 27–34)
MCHC RBC-ENTMCNC: 33 % — SIGNIFICANT CHANGE UP (ref 32–36)
MCV RBC AUTO: 86.9 FL — SIGNIFICANT CHANGE UP (ref 80–100)
NRBC # FLD: 0 K/UL — SIGNIFICANT CHANGE UP (ref 0–0)
PHOSPHATE SERPL-MCNC: 3.8 MG/DL — SIGNIFICANT CHANGE UP (ref 2.5–4.5)
PLATELET # BLD AUTO: 245 K/UL — SIGNIFICANT CHANGE UP (ref 150–400)
PMV BLD: 12.8 FL — SIGNIFICANT CHANGE UP (ref 7–13)
POTASSIUM SERPL-MCNC: 3.6 MMOL/L — SIGNIFICANT CHANGE UP (ref 3.5–5.3)
POTASSIUM SERPL-SCNC: 3.6 MMOL/L — SIGNIFICANT CHANGE UP (ref 3.5–5.3)
RBC # BLD: 4.36 M/UL — SIGNIFICANT CHANGE UP (ref 4.2–5.8)
RBC # FLD: 15 % — HIGH (ref 10.3–14.5)
SODIUM SERPL-SCNC: 142 MMOL/L — SIGNIFICANT CHANGE UP (ref 135–145)
WBC # BLD: 10.79 K/UL — HIGH (ref 3.8–10.5)
WBC # FLD AUTO: 10.79 K/UL — HIGH (ref 3.8–10.5)

## 2020-06-15 PROCEDURE — 99232 SBSQ HOSP IP/OBS MODERATE 35: CPT

## 2020-06-15 PROCEDURE — 93461 R&L HRT ART/VENTRICLE ANGIO: CPT | Mod: 26

## 2020-06-15 PROCEDURE — 76937 US GUIDE VASCULAR ACCESS: CPT | Mod: 26

## 2020-06-15 PROCEDURE — 93010 ELECTROCARDIOGRAM REPORT: CPT

## 2020-06-15 RX ORDER — POTASSIUM CHLORIDE 20 MEQ
20 PACKET (EA) ORAL ONCE
Refills: 0 | Status: COMPLETED | OUTPATIENT
Start: 2020-06-15 | End: 2020-06-16

## 2020-06-15 RX ORDER — INSULIN LISPRO 100/ML
VIAL (ML) SUBCUTANEOUS EVERY 4 HOURS
Refills: 0 | Status: DISCONTINUED | OUTPATIENT
Start: 2020-06-15 | End: 2020-06-16

## 2020-06-15 RX ORDER — INSULIN LISPRO 100/ML
5 VIAL (ML) SUBCUTANEOUS
Refills: 0 | Status: DISCONTINUED | OUTPATIENT
Start: 2020-06-15 | End: 2020-06-16

## 2020-06-15 RX ADMIN — Medication 1 DROP(S): at 21:37

## 2020-06-15 RX ADMIN — Medication 650 MILLIGRAM(S): at 21:38

## 2020-06-15 RX ADMIN — ENOXAPARIN SODIUM 40 MILLIGRAM(S): 100 INJECTION SUBCUTANEOUS at 21:35

## 2020-06-15 RX ADMIN — BUDESONIDE AND FORMOTEROL FUMARATE DIHYDRATE 2 PUFF(S): 160; 4.5 AEROSOL RESPIRATORY (INHALATION) at 21:34

## 2020-06-15 RX ADMIN — Medication 1 DROP(S): at 06:37

## 2020-06-15 RX ADMIN — BUDESONIDE AND FORMOTEROL FUMARATE DIHYDRATE 2 PUFF(S): 160; 4.5 AEROSOL RESPIRATORY (INHALATION) at 08:22

## 2020-06-15 RX ADMIN — Medication 1200 MILLIGRAM(S): at 21:35

## 2020-06-15 RX ADMIN — TAMSULOSIN HYDROCHLORIDE 0.4 MILLIGRAM(S): 0.4 CAPSULE ORAL at 21:34

## 2020-06-15 RX ADMIN — Medication 6: at 21:37

## 2020-06-15 RX ADMIN — Medication 81 MILLIGRAM(S): at 12:40

## 2020-06-15 RX ADMIN — Medication 2: at 12:39

## 2020-06-15 RX ADMIN — ATORVASTATIN CALCIUM 80 MILLIGRAM(S): 80 TABLET, FILM COATED ORAL at 21:34

## 2020-06-15 RX ADMIN — Medication 40 MILLIGRAM(S): at 06:37

## 2020-06-15 RX ADMIN — Medication 1200 MILLIGRAM(S): at 06:37

## 2020-06-15 RX ADMIN — Medication 20 MILLIGRAM(S): at 06:37

## 2020-06-15 RX ADMIN — CLOPIDOGREL BISULFATE 75 MILLIGRAM(S): 75 TABLET, FILM COATED ORAL at 12:40

## 2020-06-15 RX ADMIN — SENNA PLUS 2 TABLET(S): 8.6 TABLET ORAL at 21:35

## 2020-06-15 RX ADMIN — Medication 2: at 08:21

## 2020-06-15 RX ADMIN — ISOSORBIDE MONONITRATE 120 MILLIGRAM(S): 60 TABLET, EXTENDED RELEASE ORAL at 12:39

## 2020-06-15 RX ADMIN — Medication 19 UNIT(S): at 21:35

## 2020-06-15 RX ADMIN — Medication 20 MILLIGRAM(S): at 21:35

## 2020-06-15 NOTE — PROGRESS NOTE ADULT - SUBJECTIVE AND OBJECTIVE BOX
Patient is a 69y old  Male who presents with a chief complaint of Shortness of Breath (15 Roberto 2020 15:16)      SUBJECTIVE / OVERNIGHT EVENTS:  feels well  still intermittent chest pressure  tele no events  plan for cath this evening      Vital Signs Last 24 Hrs  T(C): 36.7 (15 Roberto 2020 12:36), Max: 36.7 (14 Jun 2020 21:17)  T(F): 98.1 (15 Roberto 2020 12:36), Max: 98.1 (14 Jun 2020 21:17)  HR: 68 (15 Roberto 2020 12:36) (68 - 95)  BP: 142/85 (15 Roberto 2020 12:36) (140/70 - 145/73)  BP(mean): --  RR: 18 (15 Roberto 2020 12:36) (18 - 18)  SpO2: 100% (15 Roberto 2020 12:36) (97% - 100%)  I&O's Summary    14 Jun 2020 07:01  -  15 Roberto 2020 07:00  --------------------------------------------------------  IN: 820 mL / OUT: 3575 mL / NET: -2755 mL    15 Roberto 2020 07:01  -  15 Roberto 2020 18:05  --------------------------------------------------------  IN: 0 mL / OUT: 1150 mL / NET: -1150 mL      PHYSICAL EXAM:  GENERAL: NAD, Comfortable, now on room air  HEAD:  Atraumatic, Normocephalic  EYES: EOMI, PERRLA, conjunctiva and sclera clear  NECK: Supple, No JVD  CHEST/LUNG: mild decrease breath sounds bilaterally, mild basilar crackles; No wheeze   HEART: Regular rate and rhythm; No murmurs, rubs, or gallops  ABDOMEN: Soft, Nontender, Nondistended; Bowel sounds present  Neuro: AAO x 3, no focal deficit, 5/5 b/l extremities  EXTREMITIES:  2+ Peripheral Pulses, No clubbing, cyanosis, +trace edema  SKIN: No rashes or lesions      LABS:                        12.5   10.79 )-----------( 245      ( 15 Roberto 2020 06:22 )             37.9     06-15    142  |  104  |  49<H>  ----------------------------<  228<H>  3.6   |  26  |  1.09    Ca    9.2      15 Roberto 2020 06:22  Phos  3.8     06-15  Mg     2.2     06-15        CAPILLARY BLOOD GLUCOSE      POCT Blood Glucose.: 203 mg/dL (15 Roberto 2020 12:14)  POCT Blood Glucose.: 215 mg/dL (15 Roberto 2020 08:39)  POCT Blood Glucose.: 206 mg/dL (15 Roberto 2020 08:19)  POCT Blood Glucose.: 204 mg/dL (15 Roberto 2020 06:33)  POCT Blood Glucose.: 263 mg/dL (15 Roberto 2020 00:27)  POCT Blood Glucose.: 557 mg/dL (14 Jun 2020 21:04)            RADIOLOGY & ADDITIONAL TESTS:    Imaging Personally Reviewed:  [x] YES  [ ] NO    Consultant(s) Notes Reviewed:  [x] YES  [ ] NO      MEDICATIONS  (STANDING):  acetylcysteine  Oral Solution 1200 milliGRAM(s) Oral <User Schedule>  artificial  tears Solution 1 Drop(s) Both EYES two times a day  aspirin enteric coated 81 milliGRAM(s) Oral daily  atorvastatin 80 milliGRAM(s) Oral at bedtime  budesonide 160 MICROgram(s)/formoterol 4.5 MICROgram(s) Inhaler 2 Puff(s) Inhalation two times a day  clopidogrel Tablet 75 milliGRAM(s) Oral daily  dextrose 5%. 1000 milliLiter(s) (50 mL/Hr) IV Continuous <Continuous>  dextrose 50% Injectable 12.5 Gram(s) IV Push once  dextrose 50% Injectable 25 Gram(s) IV Push once  dextrose 50% Injectable 25 Gram(s) IV Push once  enoxaparin Injectable 40 milliGRAM(s) SubCutaneous daily  furosemide   Injectable 40 milliGRAM(s) IV Push daily  insulin detemir injectable (LEVEMIR) 19 Unit(s) SubCutaneous at bedtime  insulin lispro (HumaLOG) corrective regimen sliding scale   SubCutaneous every 4 hours  insulin lispro (HumaLOG) corrective regimen sliding scale   SubCutaneous at bedtime  insulin lispro Injectable (HumaLOG) 5 Unit(s) SubCutaneous three times a day before meals  isosorbide   mononitrate ER Tablet (IMDUR) 120 milliGRAM(s) Oral daily  potassium chloride    Tablet ER 20 milliEquivalent(s) Oral once  predniSONE   Tablet 20 milliGRAM(s) Oral every 12 hours  senna 2 Tablet(s) Oral at bedtime  tamsulosin 0.4 milliGRAM(s) Oral at bedtime    MEDICATIONS  (PRN):  acetaminophen   Tablet .. 650 milliGRAM(s) Oral every 6 hours PRN Temp greater or equal to 38C (100.4F), Mild Pain (1 - 3), Moderate Pain (4 - 6)  albuterol/ipratropium for Nebulization 3 milliLiter(s) Nebulizer every 6 hours PRN SOB  dextrose 40% Gel 15 Gram(s) Oral once PRN Blood Glucose LESS THAN 70 milliGRAM(s)/deciliter  glucagon  Injectable 1 milliGRAM(s) IntraMuscular once PRN Glucose LESS THAN 70 milligrams/deciliter  nitroglycerin     SubLingual 0.4 milliGRAM(s) SubLingual every 5 minutes PRN Chest Pain  polyethylene glycol 3350 17 Gram(s) Oral daily PRN Constipation      Care Discussed with Consultants/Other Providers [x] YES  [ ] NO    HEALTH ISSUES - PROBLEM Dx:  Need for prophylactic measure: Need for prophylactic measure  HLD (hyperlipidemia): HLD (hyperlipidemia)  HTN (hypertension): HTN (hypertension)  BPH (benign prostatic hyperplasia): BPH (benign prostatic hyperplasia)  Chronic obstructive pulmonary disease, unspecified COPD type: Chronic obstructive pulmonary disease, unspecified COPD type  Diabetes mellitus: Diabetes mellitus  Acute on chronic heart failure: Acute on chronic heart failure

## 2020-06-15 NOTE — PROGRESS NOTE ADULT - ASSESSMENT
69y Male with history of HTN, DM, CAD s/p CABG presents with SOB. Nephrology consulted for elevated Scr.    1) ELMER: hemodynamically mediated now resolved. Scr at baseline (1.1). Given plans for cardiac cath, would continue with mucomyst for SADIE ppx. Renal US reviewed. Avoid nephrotoxins.    2) HTN: BP controlled. Can resume losartan if Scr remains stable post-cardiac cath. Monitor BP.    3) LE edema: Resolved without evidence of pulmonary edema on CT chest. Can change lasix to 40 mg PO daily if LVEDP/wedge pressure acceptable during cardiac cath. Moniotr UO.    4) SOB: As per primary team.

## 2020-06-15 NOTE — PROGRESS NOTE ADULT - ASSESSMENT
68 y/o male, with a PmHx of HTN, COPD, CHF, HLD, DM, CABG, presented to the [facility] ED with SOB. Admitted to telemetry for acute on chronic chf exacerbation.    1. CHF exacerbation  -echo showing distal apex severely hypokinetic/akinetic, LVEF 50-55%  -cont IV lasix    2. CAD s/p CABG  plan for cardiac cath today, R/B/A discussed with patient, patient agreeable to proceed   cont dap  repeat covid test today. initial negative 6/12    3. HTN  -cont current meds    4. DM  -mgmt per medicine    DVT ppx

## 2020-06-15 NOTE — PROGRESS NOTE ADULT - SUBJECTIVE AND OBJECTIVE BOX
Patient is a 69y old  Male who presents with a chief complaint of Shortness of Breath (15 Roberto 2020 09:34)      Any change in ROS: Breathing wise he feels better:  He has some chest pain at this time    MEDICATIONS  (STANDING):  acetylcysteine  Oral Solution 1200 milliGRAM(s) Oral <User Schedule>  artificial  tears Solution 1 Drop(s) Both EYES two times a day  aspirin enteric coated 81 milliGRAM(s) Oral daily  atorvastatin 80 milliGRAM(s) Oral at bedtime  budesonide 160 MICROgram(s)/formoterol 4.5 MICROgram(s) Inhaler 2 Puff(s) Inhalation two times a day  clopidogrel Tablet 75 milliGRAM(s) Oral daily  dextrose 5%. 1000 milliLiter(s) (50 mL/Hr) IV Continuous <Continuous>  dextrose 50% Injectable 12.5 Gram(s) IV Push once  dextrose 50% Injectable 25 Gram(s) IV Push once  dextrose 50% Injectable 25 Gram(s) IV Push once  enoxaparin Injectable 40 milliGRAM(s) SubCutaneous daily  furosemide   Injectable 40 milliGRAM(s) IV Push daily  insulin detemir injectable (LEVEMIR) 19 Unit(s) SubCutaneous at bedtime  insulin lispro (HumaLOG) corrective regimen sliding scale   SubCutaneous every 4 hours  insulin lispro (HumaLOG) corrective regimen sliding scale   SubCutaneous at bedtime  insulin lispro Injectable (HumaLOG) 5 Unit(s) SubCutaneous three times a day before meals  isosorbide   mononitrate ER Tablet (IMDUR) 120 milliGRAM(s) Oral daily  predniSONE   Tablet 20 milliGRAM(s) Oral every 12 hours  senna 2 Tablet(s) Oral at bedtime  tamsulosin 0.4 milliGRAM(s) Oral at bedtime    MEDICATIONS  (PRN):  acetaminophen   Tablet .. 650 milliGRAM(s) Oral every 6 hours PRN Temp greater or equal to 38C (100.4F), Mild Pain (1 - 3), Moderate Pain (4 - 6)  albuterol/ipratropium for Nebulization 3 milliLiter(s) Nebulizer every 6 hours PRN SOB  dextrose 40% Gel 15 Gram(s) Oral once PRN Blood Glucose LESS THAN 70 milliGRAM(s)/deciliter  glucagon  Injectable 1 milliGRAM(s) IntraMuscular once PRN Glucose LESS THAN 70 milligrams/deciliter  nitroglycerin     SubLingual 0.4 milliGRAM(s) SubLingual every 5 minutes PRN Chest Pain  polyethylene glycol 3350 17 Gram(s) Oral daily PRN Constipation    Vital Signs Last 24 Hrs  T(C): 36.4 (15 Roberto 2020 06:33), Max: 36.7 (14 Jun 2020 21:17)  T(F): 97.6 (15 Roberto 2020 06:33), Max: 98.1 (14 Jun 2020 21:17)  HR: 73 (15 Roberto 2020 06:33) (73 - 97)  BP: 140/70 (15 Roberto 2020 06:33) (140/70 - 160/81)  BP(mean): --  RR: 18 (15 Roberto 2020 06:33) (18 - 18)  SpO2: 100% (15 Roberto 2020 06:33) (97% - 100%)    I&O's Summary    14 Jun 2020 07:01  -  15 Roberto 2020 07:00  --------------------------------------------------------  IN: 820 mL / OUT: 3575 mL / NET: -2755 mL          Physical Exam:   GENERAL: Obese+  HEENT: KAZ/   Atraumatic, Normocephalic  ENMT: No tonsillar erythema, exudates, or enlargement; Moist mucous membranes, Good dentition, No lesions  NECK: Supple, No JVD, Normal thyroid  CHEST/LUNG: Clear to auscultaion  CVS: Regular rate and rhythm; No murmurs, rubs, or gallops  GI: : Soft, Nontender, Nondistended; Bowel sounds present  NERVOUS SYSTEM:  Alert & Oriented X3  EXTREMITIES:  2+ Peripheral Pulses, No clubbing, cyanosis, or edema  LYMPH: No lymphadenopathy noted  SKIN: No rashes or lesions  ENDOCRINOLOGY: No Thyromegaly  PSYCH: Appropriate    Labs:  23, 21, 20                            12.5   10.79 )-----------( 245      ( 15 Roberto 2020 06:22 )             37.9                         12.2   8.86  )-----------( 244      ( 14 Jun 2020 06:52 )             37.2                         11.3   9.60  )-----------( 225      ( 13 Jun 2020 06:36 )             34.8                         11.4   11.03 )-----------( 246      ( 12 Jun 2020 11:15 )             36.3     06-15    142  |  104  |  49<H>  ----------------------------<  228<H>  3.6   |  26  |  1.09  06-14    140  |  99  |  52<H>  ----------------------------<  294<H>  3.8   |  25  |  1.52<H>  06-13    137  |  96<L>  |  53<H>  ----------------------------<  517<HH>  4.5   |  24  |  1.79<H>  06-13    135  |  95<L>  |  32<H>  ----------------------------<  191<H>  3.9   |  24  |  1.37<H>  06-13    131<L>  |  92<L>  |  34<H>  ----------------------------<  551<HH>  4.3   |  22  |  1.48<H>  06-12    132<L>  |  96<L>  |  29<H>  ----------------------------<  204<H>  5.4<H>   |  18<L>  |  1.39<H>  06-12    132<L>  |  96<L>  |  28<H>  ----------------------------<  260<H>  5.4<H>   |  19<L>  |  1.31<H>    Ca    9.2      15 Roberto 2020 06:22  Ca    9.4      14 Jun 2020 06:52  Ca    9.7      13 Jun 2020 23:58  Phos  3.8     06-15  Phos  4.3     06-14  Phos  3.6     06-13  Mg     2.2     06-15  Mg     2.1     06-14  Mg     2.1     06-13    TPro  6.8  /  Alb  4.3  /  TBili  0.4  /  DBili  x   /  AST  22  /  ALT  27  /  AlkPhos  64  06-13  TPro  7.6  /  Alb  4.5  /  TBili  0.6  /  DBili  x   /  AST  34  /  ALT  33  /  AlkPhos  63  06-12    CAPILLARY BLOOD GLUCOSE      POCT Blood Glucose.: 215 mg/dL (15 Roberto 2020 08:39)  POCT Blood Glucose.: 206 mg/dL (15 Roberto 2020 08:19)  POCT Blood Glucose.: 204 mg/dL (15 Roberto 2020 06:33)  POCT Blood Glucose.: 263 mg/dL (15 Roberto 2020 00:27)  POCT Blood Glucose.: 557 mg/dL (14 Jun 2020 21:04)  POCT Blood Glucose.: 512 mg/dL (14 Jun 2020 17:39)  POCT Blood Glucose.: 530 mg/dL (14 Jun 2020 17:34)  POCT Blood Glucose.: 491 mg/dL (14 Jun 2020 17:32)  POCT Blood Glucose.: 415 mg/dL (14 Jun 2020 12:28)  POCT Blood Glucose.: 428 mg/dL (14 Jun 2020 12:26)            D-Dimer Assay, Quantitative: < 150 ng/mL (06-13 @ 19:30)  Serum Pro-Brain Natriuretic Peptide: 4597 pg/mL (06-14 @ 06:52)        RECENT CULTURES:        RESPIRATORY CULTURES:          Studies  Chest X-RAY  CT SCAN Chest   Venous Dopplers: LE:   CT Abdomen  Others

## 2020-06-15 NOTE — PROGRESS NOTE ADULT - ASSESSMENT
67 y/o male with PMHx of HTN, T2DM, CAD s/p CABG 09/2014 & GUILLE to ostial (09/2015), % with patent grafts (with patent LIMA to LAD and patent SVG to diag), recent left Heart cath with nonobstructive disease in January 2019, presenting with worsening SOB for several days, +cough. He reports orthopnea  and worsening LE swelling.      # Dyspnea likely acute on chronic heart failure in the setting of volume overload with bibasilar crackles  s/p Lasix 40 x1 in ED  diurese per cardiology, currently on Lasix IV 40 mg BID --> qdaily  TTE  LVEF= 50-55%.  tele monitoring: no events  weaned off bipap, now on nasal canula to room air  stricts I/O, daily weights  no sick contact, doubt COVID19, nasal swab neg  CXR no evidence of PNA. monitor off abx.   started Solumedrol IV by pulm for COPD exacerbation. tapered to PO given hyperglycemia.  c/w home meds: inhalers. duoneb PRN.   mild leukocytosis resolved.   neg blood cx, U/A, CXR, COVID PCR    # hx of CAD s/p CABG  cardio eval, resume home meds: Asa/Plavix/statin/betablocker  TTE as above  trend trops, likely demand ischemia  recent left Heart cath with nonobstructive disease   Plan for C today given intermittent chest pain     # Acute kidney failure  likely in the setting of cardio renal etiology.   r/o obstructive etiology. check post void bladder scan.   trend Cr, holding Losartan per renal  monitor urine outpt  avoid nephrotoxic agents    #Diabetes:   hgbA1C 7.8%, start ISS  he takes Levemir 20 units QHS and metformin  sugars high due to IV steroids now tapered  c/w  premeal insulin to humalog 5 units TID. c/w Lantus 19 units QHS  Endo consult appreciated.    DVT ppx    - [facility] DAVIE [facility]  - [phone]

## 2020-06-15 NOTE — PROGRESS NOTE ADULT - ASSESSMENT
70 y/o male with poorly controlled T2Dm (A1c 7.8%) complicated with CABG and CHF here with CHF exacerbation.   Noted to have hyperglycemia to the 500s with steroids (solumedrol), with A h/o nocturnal hypoglycemia at home on Levemir 20 units and Metformin 1000 mg BID.   Also with h/o HTN and HLD. (high risk and high medical decision making).    Patient on steroids, solumedrol 20 mg IV q 6 hours 6/14 now tapered to prednsione 20mg q12 (half the steroid dosing as prior)      T2DM  - target -180 mg/dl  - target a1c 7-8%  - Recommend consistent carb diet inpatient  6/15  pt is NPO today for cath   while NPO   -low dose ISS q4hrs - do not expect marked hyperglycemia while NPO      after cath and eating:  now that steroids are cut in half would only continue with levemir 19, I would not be inclined to increase this at this time given that steroids are decreased   -levemir 19 units sq qhs  - humalog 5-5-5 units with meals . Hold if NPO  - change correction scale to moderate Humalog scale for both AC and HS (should no longer be q4hrs)  -check 3 am glucose, if elevated can correct with bedtime scale lispro   - Recommend RD consultation        - As steroid taper effects wear off in next 24-48 hours, expect insulin requirements to decrease and adjust insulin doses accordingly to reduce risk of hypoglycemia.    inform endocrine of hypoglycemia or persistent hyperglycemia episodes as changes in pts insulin regimen will need to be made.   notify endocrine if any plans to be NPO/diet changes as this will also affect insulin regimen.  NOTIFY  endocrine of further taper in steroids     For outpatient  - dc plan depends on steroid plan at discharge.  Please clarify plan   - it was previously discussed in consult by our team, the possibility of using Metformin (if EF is normal and kidney function is healthy) with Januvia or Jardiance (if covered) and stopping Levemir but patient wants to hold off on starting new medication and stopping insulin immediately.   Would likely hold of metformin at dc given the risk of increased lactic acidosis in decompensated CHF, will need to determine closer to dc based on cath results, and workup inpt   He would prefer to continue Levemir and Metformin 1000 mg BID and provide our recommendation to his endocrinologist at [facility] and then decide to change his regimen.  - D/C plan is likely Metformin and Levemir if no contraindications  - Goal A1c is 7% without hypoglycemia  - F/u with endocrinologist at [facility]     HTN   -goal BP <130/80 in DM  management as per primary team and cardiology     HLD  goal LDL <70 in DM   Recommend statin medications as per cardiology recommendations  currently on lipitor 80mg

## 2020-06-15 NOTE — CHART NOTE - NSCHARTNOTEFT_GEN_A_CORE
S/p LHC today. R groin soft, no hematoma or active bleeding, dressing clean dry intact.  DP pulse 2+ intact. Ext warm. Pt w/o complaints.     Dennys THOMAS

## 2020-06-15 NOTE — PROGRESS NOTE ADULT - SUBJECTIVE AND OBJECTIVE BOX
CC: breathing improved, still w some chest pain, cath planned for today    TELEMETRY:     PHYSICAL EXAM:    T(C): 36.7 (06-15-20 @ 12:36), Max: 36.7 (06-14-20 @ 21:17)  HR: 68 (06-15-20 @ 12:36) (68 - 95)  BP: 142/85 (06-15-20 @ 12:36) (140/70 - 145/73)  RR: 18 (06-15-20 @ 12:36) (18 - 18)  SpO2: 100% (06-15-20 @ 12:36) (97% - 100%)  Wt(kg): --  I&O's Summary    14 Jun 2020 07:01  -  15 Roberto 2020 07:00  --------------------------------------------------------  IN: 820 mL / OUT: 3575 mL / NET: -2755 mL    15 Roberto 2020 07:01  -  15 Roberto 2020 14:17  --------------------------------------------------------  IN: 0 mL / OUT: 1150 mL / NET: -1150 mL        Appearance: Normal	  Cardiovascular: Normal S1 S2,RRR, No JVD, No murmurs  Respiratory: Lungs clear to auscultation	  Gastrointestinal:  Soft, Non-tender, + BS	  Extremities: Normal range of motion, No clubbing, cyanosis or edema  Vascular: Peripheral pulses palpable 2+ bilaterally     LABS:	 	                          12.5   10.79 )-----------( 245      ( 15 Roberto 2020 06:22 )             37.9     06-15    142  |  104  |  49<H>  ----------------------------<  228<H>  3.6   |  26  |  1.09    Ca    9.2      15 Roberto 2020 06:22  Phos  3.8     06-15  Mg     2.2     06-15            CARDIAC MARKERS:

## 2020-06-15 NOTE — PROGRESS NOTE ADULT - ASSESSMENT
70 y/o male, with a PmHx of HTN, COPD, CHF, HLD, DM, CABG, presented to the [facility] ED with SOB. Admitted to telemetry for acute on chronic chf exacerbation.    COPD exacerbation:  CHF exacerbation:   Obesity AUDI:  HTN, HLD  DM: CABG     he is wheezing : Starts steroids  cont symbicort:  Duoneb q 6 hours: Do d dimer: if high : cta chest otherwise ct chest without contrast  start bipap at night time: 10/5: 30 % fio2::   though he uses capa at home but he was hypercarbic on admission:  Controlled  Cont diuretics:  echo: : for cath eventually:   Hypercarbic resp failure: improved on bipap   DW Sesar      6/14:    feels better:    hyperglycemic:  decrease steroids:   likes the bipap n the night  for ischemia workup:  cont diuresis:  monitor renal fnction:s  cont tomonitor blood glcuse;\  DW NP    6/15:    breathing wise he feels better  his d dimer was < 150 :  he had ct chest done last night: has 6 MM RML nodule which can be followed up as an outpatient  He has left lower lobe bronchiectasis ? old infection cont Symbicort  he likely has debris in trachea  As he would need repeat ct scan chest any way in 6-8 weeks time: this can be followed up at that time   Cont steroids for a few days more: control blood glucse: endo is following  :  isch evaluation by cards:   JOSE J THOMAS

## 2020-06-15 NOTE — PROGRESS NOTE ADULT - SUBJECTIVE AND OBJECTIVE BOX
[facility] NEPHROLOGY- PROGRESS NOTE    69y Male with history of HTN, DM, CAD s/p CABG presents with SOB. Nephrology consulted for elevated Scr.    REVIEW OF SYSTEMS:  Gen: no changes in weight  Cards: no chest pain  Resp: + dyspnea  GI: no nausea or vomiting or diarrhea  Vascular: no LE edema    No Known Allergies      Hospital Medications: Medications reviewed    VITALS:  T(F): 98.1 (06-15-20 @ 12:36), Max: 98.1 (20 @ 21:17)  HR: 68 (06-15-20 @ 12:36)  BP: 142/85 (06-15-20 @ 12:36)  RR: 18 (06-15-20 @ 12:36)  SpO2: 100% (06-15-20 @ :36)  Wt(kg): --  Height (cm): 167.64 ( 17:53)  Weight (kg): 82 ( 17:53)  BMI (kg/m2): 29.2 (:53)  BSA (m2): 1.92 ( 17:53)     @ 07:01  -  06-15 @ 07:00  --------------------------------------------------------  IN: 820 mL / OUT: 3575 mL / NET: -2755 mL    06-15 @ 07:01  -  06-15 @ 15:16  --------------------------------------------------------  IN: 0 mL / OUT: 1150 mL / NET: -1150 mL        PHYSICAL EXAM:    Gen: NAD, calm  Cards: RRR, +S1/S2, no M/G/R  Resp: CTA B/L  GI: soft, NT/ND, NABS  Vascular: no LE edema B/L    LABS:  06-15    142  |  104  |  49<H>  ----------------------------<  228<H>  3.6   |  26  |  1.09    Ca    9.2      15 Roberto 2020 06:22  Phos  3.8     06-15  Mg     2.2     06-15      Creatinine Trend: 1.09 <--, 1.52 <--, 1.79 <--, 1.37 <--, 1.48 <--, 1.39 <--, 1.31 <--                        12.5   10.79 )-----------( 245      ( 15 Roberto 2020 06:22 )             37.9     Urine Studies:  Urinalysis Basic - ( 2020 08:09 )    Color: COLORLESS / Appearance: CLEAR / S.008 / pH: 6.0  Gluc: 500 / Ketone: NEGATIVE  / Bili: NEGATIVE / Urobili: NORMAL   Blood: TRACE / Protein: NEGATIVE / Nitrite: NEGATIVE   Leuk Esterase: NEGATIVE / RBC:  / WBC    Sq Epi:  / Non Sq Epi:  / Bacteria:       Protein, Random Urine: 6.6 mg/dL ( @ 10:47)  Sodium, Random Urine: 46 mmol/L ( @ 10:47)  Creatinine, Random Urine: 19.40 mg/dL ( @ 10:47)  Chloride, Random Urine: 41 mmol/L ( @ 10:47)      RADIOLOGY & ADDITIONAL STUDIES:  < from: US Kidney and Bladder (20 @ 11:54) >  IMPRESSION:     No hydronephrosis.    < end of copied text >        < from: CT Chest No Cont (20 @ 14:09) >  IMPRESSION:   A 6 mm right middle lobe nodule. Consider follow-up chest CT in 3-6 months.    There is cystic bronchiectasis in the left lower lobe. There are no lung nodules or consolidations. There is linear and nodular opacity in the trachea extending into the left mainstem bronchus which likely represents mucus/debris. Follow-up is recommended to exclude a polyp.    < end of copied text >

## 2020-06-15 NOTE — PROGRESS NOTE ADULT - SUBJECTIVE AND OBJECTIVE BOX
Chief Complaint/Follow-up on: DM    Subjective:  POC blood glucose and insulin use reviewed.  marked hyperglycemia last night to the 500s   methylpred now converted to prednsione 20 q12   pt is NPO today for a cath       MEDICATIONS  (STANDING):  acetylcysteine  Oral Solution 1200 milliGRAM(s) Oral <User Schedule>  artificial  tears Solution 1 Drop(s) Both EYES two times a day  aspirin enteric coated 81 milliGRAM(s) Oral daily  atorvastatin 80 milliGRAM(s) Oral at bedtime  budesonide 160 MICROgram(s)/formoterol 4.5 MICROgram(s) Inhaler 2 Puff(s) Inhalation two times a day  clopidogrel Tablet 75 milliGRAM(s) Oral daily  dextrose 5%. 1000 milliLiter(s) (50 mL/Hr) IV Continuous <Continuous>  dextrose 50% Injectable 12.5 Gram(s) IV Push once  dextrose 50% Injectable 25 Gram(s) IV Push once  dextrose 50% Injectable 25 Gram(s) IV Push once  enoxaparin Injectable 40 milliGRAM(s) SubCutaneous daily  furosemide   Injectable 40 milliGRAM(s) IV Push daily  insulin detemir injectable (LEVEMIR) 19 Unit(s) SubCutaneous at bedtime  insulin lispro (HumaLOG) corrective regimen sliding scale   SubCutaneous every 4 hours  insulin lispro (HumaLOG) corrective regimen sliding scale   SubCutaneous at bedtime  insulin lispro Injectable (HumaLOG) 6 Unit(s) SubCutaneous three times a day before meals  isosorbide   mononitrate ER Tablet (IMDUR) 120 milliGRAM(s) Oral daily  predniSONE   Tablet 20 milliGRAM(s) Oral every 12 hours  senna 2 Tablet(s) Oral at bedtime  tamsulosin 0.4 milliGRAM(s) Oral at bedtime    MEDICATIONS  (PRN):  acetaminophen   Tablet .. 650 milliGRAM(s) Oral every 6 hours PRN Temp greater or equal to 38C (100.4F), Mild Pain (1 - 3), Moderate Pain (4 - 6)  albuterol/ipratropium for Nebulization 3 milliLiter(s) Nebulizer every 6 hours PRN SOB  dextrose 40% Gel 15 Gram(s) Oral once PRN Blood Glucose LESS THAN 70 milliGRAM(s)/deciliter  glucagon  Injectable 1 milliGRAM(s) IntraMuscular once PRN Glucose LESS THAN 70 milligrams/deciliter  nitroglycerin     SubLingual 0.4 milliGRAM(s) SubLingual every 5 minutes PRN Chest Pain  polyethylene glycol 3350 17 Gram(s) Oral daily PRN Constipation      PHYSICAL EXAM:  VITALS: T(C): 36.4 (06-15-20 @ 06:33)  T(F): 97.6 (06-15-20 @ 06:33), Max: 98.1 (06-14-20 @ 21:17)  HR: 73 (06-15-20 @ 06:33) (73 - 97)  BP: 140/70 (06-15-20 @ 06:33) (140/70 - 160/81)  RR:  (18 - 18)  SpO2:  (97% - 100%)  Wt(kg): --  GENERAL: NAD, well-groomed, well-developed  EYES: No proptosis, no injection  RESPIRATORY: Clear to auscultation bilaterally; No rales, rhonchi, wheezing, or rubs  CARDIOVASCULAR: Regular rate and rhythm; No murmurs; no peripheral edema  GI: Soft, nontender, non distended, normal bowel sounds      POCT Blood Glucose.: 215 mg/dL (06-15-20 @ 08:39)  POCT Blood Glucose.: 206 mg/dL (06-15-20 @ 08:19)  POCT Blood Glucose.: 204 mg/dL (06-15-20 @ 06:33)  POCT Blood Glucose.: 263 mg/dL (06-15-20 @ 00:27)  POCT Blood Glucose.: 557 mg/dL (06-14-20 @ 21:04)  POCT Blood Glucose.: 512 mg/dL (06-14-20 @ 17:39)  POCT Blood Glucose.: 530 mg/dL (06-14-20 @ 17:34)  POCT Blood Glucose.: 491 mg/dL (06-14-20 @ 17:32)  POCT Blood Glucose.: 415 mg/dL (06-14-20 @ 12:28)  POCT Blood Glucose.: 428 mg/dL (06-14-20 @ 12:26)  POCT Blood Glucose.: 280 mg/dL (06-14-20 @ 08:19)  POCT Blood Glucose.: 295 mg/dL (06-14-20 @ 03:34)  POCT Blood Glucose.: 400 mg/dL (06-14-20 @ 01:18)  POCT Blood Glucose.: 429 mg/dL (06-14-20 @ 01:17)  POCT Blood Glucose.: 396 mg/dL (06-13-20 @ 21:37)  POCT Blood Glucose.: 177 mg/dL (06-13-20 @ 17:37)  POCT Blood Glucose.: 166 mg/dL (06-13-20 @ 12:24)  POCT Blood Glucose.: 132 mg/dL (06-13-20 @ 07:38)  POCT Blood Glucose.: 374 mg/dL (06-13-20 @ 03:27)  POCT Blood Glucose.: 517 mg/dL (06-13-20 @ 01:23)  POCT Blood Glucose.: 579 mg/dL (06-13-20 @ 01:07)  POCT Blood Glucose.: 543 mg/dL (06-12-20 @ 23:59)  POCT Blood Glucose.: 559 mg/dL (06-12-20 @ 23:58)  POCT Blood Glucose.: 240 mg/dL (06-12-20 @ 10:48)    06-15    142  |  104  |  49<H>  ----------------------------<  228<H>  3.6   |  26  |  1.09    EGFR if : 79  EGFR if non : 68    Ca    9.2      06-15  Mg     2.2     06-15  Phos  3.8     06-15    TPro  6.8  /  Alb  4.3  /  TBili  0.4  /  DBili  x   /  AST  22  /  ALT  27  /  AlkPhos  64  06-13          Thyroid Function Tests:  06-12 @ 15:00 TSH 1.05 FreeT4 -- T3 -- Anti TPO -- Anti Thyroglobulin Ab -- TSI --

## 2020-06-16 DIAGNOSIS — E11.65 TYPE 2 DIABETES MELLITUS WITH HYPERGLYCEMIA: ICD-10-CM

## 2020-06-16 LAB
ANION GAP SERPL CALC-SCNC: 14 MMO/L — SIGNIFICANT CHANGE UP (ref 7–14)
BUN SERPL-MCNC: 42 MG/DL — HIGH (ref 7–23)
CALCIUM SERPL-MCNC: 9.2 MG/DL — SIGNIFICANT CHANGE UP (ref 8.4–10.5)
CHLORIDE SERPL-SCNC: 104 MMOL/L — SIGNIFICANT CHANGE UP (ref 98–107)
CO2 SERPL-SCNC: 25 MMOL/L — SIGNIFICANT CHANGE UP (ref 22–31)
CREAT SERPL-MCNC: 1 MG/DL — SIGNIFICANT CHANGE UP (ref 0.5–1.3)
GLUCOSE SERPL-MCNC: 221 MG/DL — HIGH (ref 70–99)
HCT VFR BLD CALC: 39.1 % — SIGNIFICANT CHANGE UP (ref 39–50)
HGB BLD-MCNC: 12.7 G/DL — LOW (ref 13–17)
MAGNESIUM SERPL-MCNC: 2.3 MG/DL — SIGNIFICANT CHANGE UP (ref 1.6–2.6)
MCHC RBC-ENTMCNC: 28.1 PG — SIGNIFICANT CHANGE UP (ref 27–34)
MCHC RBC-ENTMCNC: 32.5 % — SIGNIFICANT CHANGE UP (ref 32–36)
MCV RBC AUTO: 86.5 FL — SIGNIFICANT CHANGE UP (ref 80–100)
NRBC # FLD: 0 K/UL — SIGNIFICANT CHANGE UP (ref 0–0)
PHOSPHATE SERPL-MCNC: 4 MG/DL — SIGNIFICANT CHANGE UP (ref 2.5–4.5)
PLATELET # BLD AUTO: 259 K/UL — SIGNIFICANT CHANGE UP (ref 150–400)
PMV BLD: 12.1 FL — SIGNIFICANT CHANGE UP (ref 7–13)
POTASSIUM SERPL-MCNC: 3.7 MMOL/L — SIGNIFICANT CHANGE UP (ref 3.5–5.3)
POTASSIUM SERPL-SCNC: 3.7 MMOL/L — SIGNIFICANT CHANGE UP (ref 3.5–5.3)
RBC # BLD: 4.52 M/UL — SIGNIFICANT CHANGE UP (ref 4.2–5.8)
RBC # FLD: 15 % — HIGH (ref 10.3–14.5)
SODIUM SERPL-SCNC: 143 MMOL/L — SIGNIFICANT CHANGE UP (ref 135–145)
WBC # BLD: 7.51 K/UL — SIGNIFICANT CHANGE UP (ref 3.8–10.5)
WBC # FLD AUTO: 7.51 K/UL — SIGNIFICANT CHANGE UP (ref 3.8–10.5)

## 2020-06-16 PROCEDURE — 99232 SBSQ HOSP IP/OBS MODERATE 35: CPT

## 2020-06-16 RX ORDER — INSULIN LISPRO 100/ML
VIAL (ML) SUBCUTANEOUS
Refills: 0 | Status: DISCONTINUED | OUTPATIENT
Start: 2020-06-16 | End: 2020-06-18

## 2020-06-16 RX ORDER — RANOLAZINE 500 MG/1
500 TABLET, FILM COATED, EXTENDED RELEASE ORAL
Refills: 0 | Status: DISCONTINUED | OUTPATIENT
Start: 2020-06-16 | End: 2020-06-18

## 2020-06-16 RX ORDER — INSULIN DETEMIR 100/ML (3)
20 INSULIN PEN (ML) SUBCUTANEOUS AT BEDTIME
Refills: 0 | Status: DISCONTINUED | OUTPATIENT
Start: 2020-06-16 | End: 2020-06-18

## 2020-06-16 RX ORDER — FUROSEMIDE 40 MG
40 TABLET ORAL DAILY
Refills: 0 | Status: DISCONTINUED | OUTPATIENT
Start: 2020-06-16 | End: 2020-06-18

## 2020-06-16 RX ORDER — INSULIN LISPRO 100/ML
7 VIAL (ML) SUBCUTANEOUS
Refills: 0 | Status: DISCONTINUED | OUTPATIENT
Start: 2020-06-16 | End: 2020-06-18

## 2020-06-16 RX ADMIN — BUDESONIDE AND FORMOTEROL FUMARATE DIHYDRATE 2 PUFF(S): 160; 4.5 AEROSOL RESPIRATORY (INHALATION) at 22:53

## 2020-06-16 RX ADMIN — CLOPIDOGREL BISULFATE 75 MILLIGRAM(S): 75 TABLET, FILM COATED ORAL at 11:47

## 2020-06-16 RX ADMIN — Medication 5 UNIT(S): at 12:15

## 2020-06-16 RX ADMIN — BUDESONIDE AND FORMOTEROL FUMARATE DIHYDRATE 2 PUFF(S): 160; 4.5 AEROSOL RESPIRATORY (INHALATION) at 08:31

## 2020-06-16 RX ADMIN — Medication 12: at 17:45

## 2020-06-16 RX ADMIN — Medication 81 MILLIGRAM(S): at 11:46

## 2020-06-16 RX ADMIN — Medication 1200 MILLIGRAM(S): at 05:38

## 2020-06-16 RX ADMIN — Medication 1 DROP(S): at 17:46

## 2020-06-16 RX ADMIN — Medication 1 DROP(S): at 05:38

## 2020-06-16 RX ADMIN — ISOSORBIDE MONONITRATE 120 MILLIGRAM(S): 60 TABLET, EXTENDED RELEASE ORAL at 11:47

## 2020-06-16 RX ADMIN — ATORVASTATIN CALCIUM 80 MILLIGRAM(S): 80 TABLET, FILM COATED ORAL at 22:53

## 2020-06-16 RX ADMIN — Medication 4: at 01:29

## 2020-06-16 RX ADMIN — Medication 4: at 08:28

## 2020-06-16 RX ADMIN — Medication 5 UNIT(S): at 08:27

## 2020-06-16 RX ADMIN — Medication 7 UNIT(S): at 17:46

## 2020-06-16 RX ADMIN — Medication 40 MILLIGRAM(S): at 05:38

## 2020-06-16 RX ADMIN — SENNA PLUS 2 TABLET(S): 8.6 TABLET ORAL at 22:53

## 2020-06-16 RX ADMIN — Medication 40 MILLIGRAM(S): at 12:14

## 2020-06-16 RX ADMIN — Medication 2: at 22:53

## 2020-06-16 RX ADMIN — Medication 20 UNIT(S): at 22:52

## 2020-06-16 RX ADMIN — TAMSULOSIN HYDROCHLORIDE 0.4 MILLIGRAM(S): 0.4 CAPSULE ORAL at 22:53

## 2020-06-16 RX ADMIN — Medication 0.4 MILLIGRAM(S): at 08:39

## 2020-06-16 RX ADMIN — Medication 6: at 12:16

## 2020-06-16 RX ADMIN — ENOXAPARIN SODIUM 40 MILLIGRAM(S): 100 INJECTION SUBCUTANEOUS at 11:47

## 2020-06-16 RX ADMIN — Medication 20 MILLIEQUIVALENT(S): at 08:39

## 2020-06-16 RX ADMIN — RANOLAZINE 500 MILLIGRAM(S): 500 TABLET, FILM COATED, EXTENDED RELEASE ORAL at 17:46

## 2020-06-16 RX ADMIN — Medication 20 MILLIGRAM(S): at 08:36

## 2020-06-16 NOTE — PROGRESS NOTE ADULT - SUBJECTIVE AND OBJECTIVE BOX
Chief Complaint: DM 2     History: Patient seen at bedside. Patient reports he is eating meals, had rice, bread and lentils for lunch, denies n/v, denies s/s of hypoglycemia  Now off steroids. Last dose Solumedrol 20 mg IV was 6/14 @ 12 PM. Then received Prednisone 20 mg x 1 dose 6/15 @ 6 AM  Most recent BG above target at 289 mg/dl       MEDICATIONS  (STANDING):  artificial  tears Solution 1 Drop(s) Both EYES two times a day  aspirin enteric coated 81 milliGRAM(s) Oral daily  atorvastatin 80 milliGRAM(s) Oral at bedtime  budesonide 160 MICROgram(s)/formoterol 4.5 MICROgram(s) Inhaler 2 Puff(s) Inhalation two times a day  clopidogrel Tablet 75 milliGRAM(s) Oral daily  dextrose 5%. 1000 milliLiter(s) (50 mL/Hr) IV Continuous <Continuous>  dextrose 50% Injectable 12.5 Gram(s) IV Push once  dextrose 50% Injectable 25 Gram(s) IV Push once  dextrose 50% Injectable 25 Gram(s) IV Push once  enoxaparin Injectable 40 milliGRAM(s) SubCutaneous daily  furosemide    Tablet 40 milliGRAM(s) Oral daily  insulin detemir injectable (LEVEMIR) 19 Unit(s) SubCutaneous at bedtime  insulin lispro (HumaLOG) corrective regimen sliding scale   SubCutaneous three times a day before meals  insulin lispro (HumaLOG) corrective regimen sliding scale   SubCutaneous at bedtime  insulin lispro Injectable (HumaLOG) 5 Unit(s) SubCutaneous three times a day before meals  isosorbide   mononitrate ER Tablet (IMDUR) 120 milliGRAM(s) Oral daily  ranolazine 500 milliGRAM(s) Oral two times a day  senna 2 Tablet(s) Oral at bedtime  tamsulosin 0.4 milliGRAM(s) Oral at bedtime    MEDICATIONS  (PRN):  acetaminophen   Tablet .. 650 milliGRAM(s) Oral every 6 hours PRN Temp greater or equal to 38C (100.4F), Mild Pain (1 - 3), Moderate Pain (4 - 6)  albuterol/ipratropium for Nebulization 3 milliLiter(s) Nebulizer every 6 hours PRN SOB  dextrose 40% Gel 15 Gram(s) Oral once PRN Blood Glucose LESS THAN 70 milliGRAM(s)/deciliter  glucagon  Injectable 1 milliGRAM(s) IntraMuscular once PRN Glucose LESS THAN 70 milligrams/deciliter  nitroglycerin     SubLingual 0.4 milliGRAM(s) SubLingual every 5 minutes PRN Chest Pain  polyethylene glycol 3350 17 Gram(s) Oral daily PRN Constipation    No Known Allergies    Review of Systems:  HEENT: No pain  Cardiovascular: +ongoing chest pressure, improved from admission  Endocrine: No hypoglycemia symptoms   GI: No nausea, vomiting    PHYSICAL EXAM:  VITALS: T(C): 37.3 (06-16-20 @ 13:18)  T(F): 99.1 (06-16-20 @ 13:18), Max: 99.1 (06-16-20 @ 13:18)  HR: 74 (06-16-20 @ 13:18) (65 - 76)  BP: 144/80 (06-16-20 @ 13:18) (140/72 - 151/82)  RR:  (16 - 18)  SpO2:  (98% - 100%)  Wt(kg): --  GENERAL: NAD  EYES: No proptosis, anicteric  HEENT:  Atraumatic, Normocephalic, moist mucous membranes  RESPIRATORY: unlabored respirations     CAPILLARY BLOOD GLUCOSE    POCT Blood Glucose.: 289 mg/dL (16 Jun 2020 12:10)  POCT Blood Glucose.: 237 mg/dL (16 Jun 2020 08:14)  POCT Blood Glucose.: 190 mg/dL (16 Jun 2020 05:35)  POCT Blood Glucose.: 338 mg/dL (16 Jun 2020 01:20)  POCT Blood Glucose.: 434 mg/dL (15 Roberto 2020 21:20)  POCT Blood Glucose.: 187 mg/dL (15 Roberto 2020 18:52)      06-16    143  |  104  |  42<H>  ----------------------------<  221<H>  3.7   |  25  |  1.00    EGFR if : 87  EGFR if non : 75    Ca    9.2      06-16  Mg     2.3     06-16  Phos  4.0     06-16      Thyroid Function Tests:  06-12 @ 15:00 TSH 1.05 FreeT4 -- T3 -- Anti TPO -- Anti Thyroglobulin Ab -- TSI --      A1C with Estimated Average Glucose: 7.8 % (06-13-20 @ 06:36)  A1C with Estimated Average Glucose: 7.9 % (06-13-20 @ 06:00)

## 2020-06-16 NOTE — PROGRESS NOTE ADULT - SUBJECTIVE AND OBJECTIVE BOX
[facility] NEPHROLOGY- PROGRESS NOTE    69y Male with history of HTN, DM, CAD s/p CABG presents with SOB. Nephrology consulted for elevated Scr.    REVIEW OF SYSTEMS:  Gen: no changes in weight  Cards: no chest pain  Resp: + dyspnea  GI: no nausea or vomiting or diarrhea  Vascular: no LE edema    No Known Allergies      Hospital Medications: Medications reviewed      VITALS:  T(F): 99.1 (20 @ 13:18), Max: 99.1 (20 @ 13:18)  HR: 74 (20 @ 13:18)  BP: 144/80 (20 @ 13:18)  RR: 17 (20 @ 13:18)  SpO2: 98% (20 @ 13:18)  Wt(kg): --    06-15 @ 07:01  -   @ 07:00  --------------------------------------------------------  IN: 358 mL / OUT: 2500 mL / NET: -2142 mL     @ 07:01  -   @ 13:24  --------------------------------------------------------  IN: 620 mL / OUT: 1000 mL / NET: -380 mL      PHYSICAL EXAM:    Gen: NAD, calm  Cards: RRR, +S1/S2, no M/G/R  Resp: CTA B/L  GI: soft, NT/ND, NABS  Vascular: no LE edema B/L    LABS:      143  |  104  |  42<H>  ----------------------------<  221<H>  3.7   |  25  |  1.00    Ca    9.2      2020 05:40  Phos  4.0     16  Mg     2.3           Creatinine Trend: 1.00 <--, 1.09 <--, 1.52 <--, 1.79 <--, 1.37 <--, 1.48 <--, 1.39 <--, 1.31 <--                        12.7   7.51  )-----------( 259      ( 2020 05:40 )             39.1     Urine Studies:  Urinalysis Basic - ( 2020 08:09 )    Color: COLORLESS / Appearance: CLEAR / S.008 / pH: 6.0  Gluc: 500 / Ketone: NEGATIVE  / Bili: NEGATIVE / Urobili: NORMAL   Blood: TRACE / Protein: NEGATIVE / Nitrite: NEGATIVE   Leuk Esterase: NEGATIVE / RBC:  / WBC    Sq Epi:  / Non Sq Epi:  / Bacteria:       Protein, Random Urine: 6.6 mg/dL ( @ 10:47)  Sodium, Random Urine: 46 mmol/L ( @ 10:47)  Creatinine, Random Urine: 19.40 mg/dL ( @ 10:47)  Chloride, Random Urine: 41 mmol/L ( @ 10:47)

## 2020-06-16 NOTE — PROGRESS NOTE ADULT - ASSESSMENT
69y Male with history of HTN, DM, CAD s/p CABG presents with SOB. Nephrology consulted for elevated Scr.    1) ELMER: hemodynamically mediated now resolved. Renal function stable despite cardiac cath on 6/145 (unknown contrast volume). Renal US reviewed. Avoid nephrotoxins.    2) HTN: BP controlled. Can resume losartan as needed by cardiology. Monitor BP.    3) LE edema: Resolved without evidence of pulmonary edema on CT chest. Agree with changing lasix to 40 mg PO daily. Monitor UO.    4) SOB: As per primary team.    Will sign off. Please call with questions.

## 2020-06-16 NOTE — PROGRESS NOTE ADULT - ASSESSMENT
68 y/o male with poorly controlled T2Dm (A1c 7.8%), HTN, HLD, CABG and CHF here with CHF exacerbation.   Noted to have hyperglycemia to the 500s with steroids (solumedrol), with a h/o nocturnal hypoglycemia at home on Levemir 20 units and Metformin 1000 mg BID. Now off steroids (last dose was Prednisone 20 mg 6/15 @ 6 AM)    1. DM 2  -BG target 100-180 mg/dl  -A1c target 7-8%  -Recommend to increase Humalog to 7 units SQ TID before meals (Hold if NPO/not eating meal)  -Increase Levemir to 20 units SQ qHS (home dose)   -Continue Humalog MODERATE dose correctional scales as currently ordered, until BG consistently running below 200s - then can decrease to LOW dose  -Check BG before meals and bedtime  -Consistent carbohydrate diet (note, patient is vegetarian)   -Recommend RD consultation    Discharge Plan:  -Would likely hold of metformin at dc given the risk of increased lactic acidosis in decompensated CHF, will need to determine closer to dc based on cath results, and workup inpatient. Per consult, patient would prefer to continue Levemir and Metformin 1000 mg BID and provide our recommendation to his endocrinologist at [facility] and then decide to change his regimen - TBD  -Followup with endocrinologist at [facility]     2. HTN   -Goal BP <130/80 in DM  -Management as per primary team and cardiology     3. HLD  -Goal LDL <70 in DM   -Recommend statin as per cardiology recommendations, currently on Lipitor 80mg     Christina Bowles  Nurse Practitioner  Division of Endocrinology & Diabetes  In house pager #[phone]/long range pager #[phone]    If before 9AM or after 6PM, or on weekends/holidays, please call endocrine answering service for assistance ([phone]).  For nonurgent matters email [email or web address] for assistance.

## 2020-06-16 NOTE — PROVIDER CONTACT NOTE (OTHER) - BACKGROUND
Patient 69year old male came in for respiratory failure. Has a past medical history of CHF, HTN and diabetes mellitus. Cath lab performed yesterday

## 2020-06-16 NOTE — PROVIDER CONTACT NOTE (OTHER) - SITUATION
Patient blood sugar 434. Patient appears to be in no distress. Patient denies symptoms of hyperglycemia, No dizziness.

## 2020-06-16 NOTE — PROGRESS NOTE ADULT - SUBJECTIVE AND OBJECTIVE BOX
Patient is a 69y old  Male who presents with a chief complaint of Shortness of Breath (15 Roberto 2020 15:16)      Any change in ROS: Doing ok :   says his breathing i s better:  no wheezing: no ptca done yesterday :  s/p cath     MEDICATIONS  (STANDING):  artificial  tears Solution 1 Drop(s) Both EYES two times a day  aspirin enteric coated 81 milliGRAM(s) Oral daily  atorvastatin 80 milliGRAM(s) Oral at bedtime  budesonide 160 MICROgram(s)/formoterol 4.5 MICROgram(s) Inhaler 2 Puff(s) Inhalation two times a day  clopidogrel Tablet 75 milliGRAM(s) Oral daily  dextrose 5%. 1000 milliLiter(s) (50 mL/Hr) IV Continuous <Continuous>  dextrose 50% Injectable 12.5 Gram(s) IV Push once  dextrose 50% Injectable 25 Gram(s) IV Push once  dextrose 50% Injectable 25 Gram(s) IV Push once  enoxaparin Injectable 40 milliGRAM(s) SubCutaneous daily  furosemide   Injectable 40 milliGRAM(s) IV Push daily  insulin detemir injectable (LEVEMIR) 19 Unit(s) SubCutaneous at bedtime  insulin lispro (HumaLOG) corrective regimen sliding scale   SubCutaneous three times a day before meals  insulin lispro (HumaLOG) corrective regimen sliding scale   SubCutaneous at bedtime  insulin lispro Injectable (HumaLOG) 5 Unit(s) SubCutaneous three times a day before meals  isosorbide   mononitrate ER Tablet (IMDUR) 120 milliGRAM(s) Oral daily  senna 2 Tablet(s) Oral at bedtime  tamsulosin 0.4 milliGRAM(s) Oral at bedtime    MEDICATIONS  (PRN):  acetaminophen   Tablet .. 650 milliGRAM(s) Oral every 6 hours PRN Temp greater or equal to 38C (100.4F), Mild Pain (1 - 3), Moderate Pain (4 - 6)  albuterol/ipratropium for Nebulization 3 milliLiter(s) Nebulizer every 6 hours PRN SOB  dextrose 40% Gel 15 Gram(s) Oral once PRN Blood Glucose LESS THAN 70 milliGRAM(s)/deciliter  glucagon  Injectable 1 milliGRAM(s) IntraMuscular once PRN Glucose LESS THAN 70 milligrams/deciliter  nitroglycerin     SubLingual 0.4 milliGRAM(s) SubLingual every 5 minutes PRN Chest Pain  polyethylene glycol 3350 17 Gram(s) Oral daily PRN Constipation    Vital Signs Last 24 Hrs  T(C): 36.7 (16 Jun 2020 09:34), Max: 36.9 (15 Roberto 2020 21:27)  T(F): 98.1 (16 Jun 2020 09:34), Max: 98.4 (15 Roberto 2020 21:27)  HR: 76 (16 Jun 2020 09:34) (65 - 76)  BP: 151/82 (16 Jun 2020 09:34) (140/72 - 151/82)  BP(mean): --  RR: 18 (16 Jun 2020 09:34) (17 - 18)  SpO2: 100% (16 Jun 2020 09:34) (98% - 100%)    I&O's Summary    15 Roberto 2020 07:01  -  16 Jun 2020 07:00  --------------------------------------------------------  IN: 358 mL / OUT: 2500 mL / NET: -2142 mL    16 Jun 2020 07:01  -  16 Jun 2020 10:58  --------------------------------------------------------  IN: 250 mL / OUT: 200 mL / NET: 50 mL          Physical Exam:   GENERAL: NAD, well-groomed, well-developed  HEENT: KAZ/   Atraumatic, Normocephalic  ENMT: No tonsillar erythema, exudates, or enlargement; Moist mucous membranes, Good dentition, No lesions  NECK: Supple, No JVD, Normal thyroid  CHEST/LUNG: Clear to auscultaion  CVS: Regular rate and rhythm; No murmurs, rubs, or gallops  GI: : Soft, Nontender, Nondistended; Bowel sounds present  NERVOUS SYSTEM:  Alert & Oriented X3  EXTREMITIES: -edema  LYMPH: No lymphadenopathy noted  SKIN: No rashes or lesions  ENDOCRINOLOGY: No Thyromegaly  PSYCH: Appropriate    Labs:  23, 21, 20                            12.7   7.51  )-----------( 259      ( 16 Jun 2020 05:40 )             39.1                         12.5   10.79 )-----------( 245      ( 15 Roberto 2020 06:22 )             37.9                         12.2   8.86  )-----------( 244      ( 14 Jun 2020 06:52 )             37.2                         11.3   9.60  )-----------( 225      ( 13 Jun 2020 06:36 )             34.8                         11.4   11.03 )-----------( 246      ( 12 Jun 2020 11:15 )             36.3     06-16    143  |  104  |  42<H>  ----------------------------<  221<H>  3.7   |  25  |  1.00  06-15    142  |  104  |  49<H>  ----------------------------<  228<H>  3.6   |  26  |  1.09  06-14    140  |  99  |  52<H>  ----------------------------<  294<H>  3.8   |  25  |  1.52<H>  06-13    137  |  96<L>  |  53<H>  ----------------------------<  517<HH>  4.5   |  24  |  1.79<H>  06-13    135  |  95<L>  |  32<H>  ----------------------------<  191<H>  3.9   |  24  |  1.37<H>  06-13    131<L>  |  92<L>  |  34<H>  ----------------------------<  551<HH>  4.3   |  22  |  1.48<H>  06-12    132<L>  |  96<L>  |  29<H>  ----------------------------<  204<H>  5.4<H>   |  18<L>  |  1.39<H>  06-12    132<L>  |  96<L>  |  28<H>  ----------------------------<  260<H>  5.4<H>   |  19<L>  |  1.31<H>    Ca    9.2      16 Jun 2020 05:40  Ca    9.2      15 Roberto 2020 06:22  Phos  4.0     06-16  Phos  3.8     06-15  Mg     2.3     06-16  Mg     2.2     06-15    TPro  6.8  /  Alb  4.3  /  TBili  0.4  /  DBili  x   /  AST  22  /  ALT  27  /  AlkPhos  64  06-13  TPro  7.6  /  Alb  4.5  /  TBili  0.6  /  DBili  x   /  AST  34  /  ALT  33  /  AlkPhos  63  06-12    CAPILLARY BLOOD GLUCOSE      POCT Blood Glucose.: 237 mg/dL (16 Jun 2020 08:14)  POCT Blood Glucose.: 190 mg/dL (16 Jun 2020 05:35)  POCT Blood Glucose.: 338 mg/dL (16 Jun 2020 01:20)  POCT Blood Glucose.: 434 mg/dL (15 Roberto 2020 21:20)  POCT Blood Glucose.: 187 mg/dL (15 Roberto 2020 18:52)  POCT Blood Glucose.: 203 mg/dL (15 Roberto 2020 12:14)          < from: CT Chest No Cont (06.14.20 @ 14:09) >  COMPARISON: CT abdomen and pelvis 9/23/2014..    PROCEDURE:   CT of the Chest was performed without intravenous contrast.  Sagittal and coronal reformats were performed.    FINDINGS:    LUNGS AND AIRWAYS: There is cystic bronchiectasis in the left lower lobe. There are no lung nodules or consolidations. There is linear and nodular opacity in the trachea extending into the left mainstem bronchus which likely represents mucus/debris. Short-term follow-up is recommended to exclude a polyp.     PLEURA: No pleural effusion. No pneumothorax.  MEDIASTINUM AND JAE: No lymphadenopathy.  VESSELS: Atherosclerotic changes of the aorta and coronary arteries.  HEART: Heart size is enlarged. Mitral annular calcifications. No pericardial effusion. Status post CABG.  CHEST WALL AND LOWER NECK: Status post median sternotomy.  VISUALIZED UPPER ABDOMEN: Within normal limits.  BONES: Degenerative changes. Sternotomy.    IMPRESSION:   A 6 mm right middle lobe nodule. Consider follow-up chest CT in 3-6 months.    There is cystic bronchiectasis in the left lower lobe. There are no lung nodules or consolidations. There is linear and nodular opacity in the trachea extending into the left mainstem bronchus which likely represents mucus/debris. Follow-up is recommended to exclude a polyp.              ADIEL RUIZ M.D., RADIOLOGY RESIDENT  This document has been electronically signed.  MYKE SOLER M.D., ATTENDING RADIOLOGIST  This document has been electronically signed. Jun 14 2020  2:27PM        < end of copied text >    D-Dimer Assay, Quantitative: < 150 ng/mL (06-13 @ 19:30)  Serum Pro-Brain Natriuretic Peptide: 4597 pg/mL (06-14 @ 06:52)        RECENT CULTURES:        RESPIRATORY CULTURES:          Studies  Chest X-RAY  CT SCAN Chest   Venous Dopplers: LE:   CT Abdomen  Others

## 2020-06-16 NOTE — PROGRESS NOTE ADULT - SUBJECTIVE AND OBJECTIVE BOX
Patient is a 69y old  Male who presents with a chief complaint of Shortness of Breath (16 Jun 2020 13:23)      SUBJECTIVE / OVERNIGHT EVENTS:  feels better  Ranexa added for chest pain symptom management  tele stable  no cp, no sob, no n/v/d. no abdominal pain.  no headache, no dizziness.   off steroids, sugars stable.       Vital Signs Last 24 Hrs  T(C): 37.3 (16 Jun 2020 13:18), Max: 37.3 (16 Jun 2020 13:18)  T(F): 99.1 (16 Jun 2020 13:18), Max: 99.1 (16 Jun 2020 13:18)  HR: 74 (16 Jun 2020 13:18) (65 - 76)  BP: 144/80 (16 Jun 2020 13:18) (140/72 - 151/82)  BP(mean): --  RR: 17 (16 Jun 2020 13:18) (16 - 18)  SpO2: 98% (16 Jun 2020 13:18) (98% - 100%)  I&O's Summary    15 Roberto 2020 07:01  -  16 Jun 2020 07:00  --------------------------------------------------------  IN: 358 mL / OUT: 2500 mL / NET: -2142 mL    16 Jun 2020 07:01  -  16 Jun 2020 15:54  --------------------------------------------------------  IN: 620 mL / OUT: 1000 mL / NET: -380 mL          PHYSICAL EXAM:  GENERAL: NAD, Comfortable, stable on room air  HEAD:  Atraumatic, Normocephalic  EYES: EOMI, PERRLA, conjunctiva and sclera clear  NECK: Supple, No JVD  CHEST/LUNG: mild decrease breath sounds bilaterally, mild basilar crackles; No wheeze   HEART: Regular rate and rhythm; No murmurs, rubs, or gallops  ABDOMEN: Soft, Nontender, Nondistended; Bowel sounds present  Neuro: AAO x 3, no focal deficit, 5/5 b/l extremities  EXTREMITIES:  2+ Peripheral Pulses, No clubbing, cyanosis, +trace edema  SKIN: No rashes or lesions      LABS:                        12.7   7.51  )-----------( 259      ( 16 Jun 2020 05:40 )             39.1     06-16    143  |  104  |  42<H>  ----------------------------<  221<H>  3.7   |  25  |  1.00    Ca    9.2      16 Jun 2020 05:40  Phos  4.0     06-16  Mg     2.3     06-16        CAPILLARY BLOOD GLUCOSE      POCT Blood Glucose.: 289 mg/dL (16 Jun 2020 12:10)  POCT Blood Glucose.: 237 mg/dL (16 Jun 2020 08:14)  POCT Blood Glucose.: 190 mg/dL (16 Jun 2020 05:35)  POCT Blood Glucose.: 338 mg/dL (16 Jun 2020 01:20)  POCT Blood Glucose.: 434 mg/dL (15 Roberto 2020 21:20)  POCT Blood Glucose.: 187 mg/dL (15 Roberto 2020 18:52)            RADIOLOGY & ADDITIONAL TESTS:    Imaging Personally Reviewed:  [x] YES  [ ] NO    Consultant(s) Notes Reviewed:  [x] YES  [ ] NO      MEDICATIONS  (STANDING):  artificial  tears Solution 1 Drop(s) Both EYES two times a day  aspirin enteric coated 81 milliGRAM(s) Oral daily  atorvastatin 80 milliGRAM(s) Oral at bedtime  budesonide 160 MICROgram(s)/formoterol 4.5 MICROgram(s) Inhaler 2 Puff(s) Inhalation two times a day  clopidogrel Tablet 75 milliGRAM(s) Oral daily  dextrose 5%. 1000 milliLiter(s) (50 mL/Hr) IV Continuous <Continuous>  dextrose 50% Injectable 12.5 Gram(s) IV Push once  dextrose 50% Injectable 25 Gram(s) IV Push once  dextrose 50% Injectable 25 Gram(s) IV Push once  enoxaparin Injectable 40 milliGRAM(s) SubCutaneous daily  furosemide    Tablet 40 milliGRAM(s) Oral daily  insulin detemir injectable (LEVEMIR) 19 Unit(s) SubCutaneous at bedtime  insulin lispro (HumaLOG) corrective regimen sliding scale   SubCutaneous three times a day before meals  insulin lispro (HumaLOG) corrective regimen sliding scale   SubCutaneous at bedtime  insulin lispro Injectable (HumaLOG) 5 Unit(s) SubCutaneous three times a day before meals  isosorbide   mononitrate ER Tablet (IMDUR) 120 milliGRAM(s) Oral daily  ranolazine 500 milliGRAM(s) Oral two times a day  senna 2 Tablet(s) Oral at bedtime  tamsulosin 0.4 milliGRAM(s) Oral at bedtime    MEDICATIONS  (PRN):  acetaminophen   Tablet .. 650 milliGRAM(s) Oral every 6 hours PRN Temp greater or equal to 38C (100.4F), Mild Pain (1 - 3), Moderate Pain (4 - 6)  albuterol/ipratropium for Nebulization 3 milliLiter(s) Nebulizer every 6 hours PRN SOB  dextrose 40% Gel 15 Gram(s) Oral once PRN Blood Glucose LESS THAN 70 milliGRAM(s)/deciliter  glucagon  Injectable 1 milliGRAM(s) IntraMuscular once PRN Glucose LESS THAN 70 milligrams/deciliter  nitroglycerin     SubLingual 0.4 milliGRAM(s) SubLingual every 5 minutes PRN Chest Pain  polyethylene glycol 3350 17 Gram(s) Oral daily PRN Constipation      Care Discussed with Consultants/Other Providers [x] YES  [ ] NO    HEALTH ISSUES - PROBLEM Dx:  Need for prophylactic measure: Need for prophylactic measure  HLD (hyperlipidemia): HLD (hyperlipidemia)  HTN (hypertension): HTN (hypertension)  BPH (benign prostatic hyperplasia): BPH (benign prostatic hyperplasia)  Chronic obstructive pulmonary disease, unspecified COPD type: Chronic obstructive pulmonary disease, unspecified COPD type  Diabetes mellitus: Diabetes mellitus  Acute on chronic heart failure: Acute on chronic heart failure

## 2020-06-16 NOTE — PROGRESS NOTE ADULT - SUBJECTIVE AND OBJECTIVE BOX
CC: still w some chest pain, partially reproducible, breathing improved    TELEMETRY:     PHYSICAL EXAM:    T(C): 36.7 (06-16-20 @ 11:36), Max: 36.9 (06-15-20 @ 21:27)  HR: 70 (06-16-20 @ 11:36) (65 - 76)  BP: 143/78 (06-16-20 @ 11:36) (140/72 - 151/82)  RR: 16 (06-16-20 @ 11:36) (16 - 18)  SpO2: 100% (06-16-20 @ 11:36) (98% - 100%)  Wt(kg): --  I&O's Summary    15 Roberto 2020 07:01  -  16 Jun 2020 07:00  --------------------------------------------------------  IN: 358 mL / OUT: 2500 mL / NET: -2142 mL    16 Jun 2020 07:01  -  16 Jun 2020 12:58  --------------------------------------------------------  IN: 620 mL / OUT: 1000 mL / NET: -380 mL        Appearance: Normal	  Cardiovascular: Normal S1 S2,RRR, No JVD, No murmurs  Respiratory: Lungs clear to auscultation	  Gastrointestinal:  Soft, Non-tender, + BS	  Extremities: Normal range of motion, No clubbing, cyanosis or edema  Vascular: Peripheral pulses palpable 2+ bilaterally     LABS:	 	                          12.7   7.51  )-----------( 259      ( 16 Jun 2020 05:40 )             39.1     06-16    143  |  104  |  42<H>  ----------------------------<  221<H>  3.7   |  25  |  1.00    Ca    9.2      16 Jun 2020 05:40  Phos  4.0     06-16  Mg     2.3     06-16            CARDIAC MARKERS:

## 2020-06-16 NOTE — PROVIDER CONTACT NOTE (OTHER) - ACTION/TREATMENT ORDERED:
Aletha Ken made aware, Levemir and additional 6 units of insulin given, will recheck POC in 4 hours as ordered.

## 2020-06-16 NOTE — PROGRESS NOTE ADULT - ASSESSMENT
68 y/o male, with a PmHx of HTN, COPD, CHF, HLD, DM, CABG, presented to the [facility] ED with SOB. Admitted to telemetry for acute on chronic chf exacerbation.    1. CHF exacerbation  -echo showing distal apex severely hypokinetic/akinetic, LVEF 50-55%  -improved, change to PO    2. CAD s/p CABG  pt s/p cath diffuse small vessel CAD not amenable to PCI  add ranexa   cont dap      3. HTN  -cont current meds    4. DM  -mgmt per medicine    DVT ppx

## 2020-06-16 NOTE — PROVIDER CONTACT NOTE (OTHER) - SITUATION
Patient blood sugar is 462, repeat is 429. Patient appears to be in no distress. Patient denies symptoms of hyperglycemia, No dizziness.

## 2020-06-16 NOTE — PROVIDER CONTACT NOTE (OTHER) - SITUATION
Patient blood sugar 338. Patient appears to be in no distress. Patient denies symptoms of hyperglycemia, No dizziness.

## 2020-06-16 NOTE — PROVIDER CONTACT NOTE (OTHER) - ACTION/TREATMENT ORDERED:
WILLIAM Ortega made aware,  7 units insulin standing pre meals  and corrective scale given as per order. Will continue to monitor

## 2020-06-16 NOTE — PROGRESS NOTE ADULT - ASSESSMENT
70 y/o male, with a PmHx of HTN, COPD, CHF, HLD, DM, CABG, presented to the [facility] ED with SOB. Admitted to telemetry for acute on chronic chf exacerbation.    COPD exacerbation:  CHF exacerbation:   Obesity AUDI:  HTN, HLD  DM: CABG     he is wheezing : Starts steroids  cont symbicort:  Duoneb q 6 hours: Do d dimer: if high : cta chest otherwise ct chest without contrast  start bipap at night time: 10/5: 30 % fio2::   though he uses capa at home but he was hypercarbic on admission:  Controlled  Cont diuretics:  echo: : for cath eventually:   Hypercarbic resp failure: improved on bipap   DW Sesar      6/14:    feels better:    hyperglycemic:  decrease steroids:   likes the bipap n the night  for ischemia workup:  cont diuresis:  monitor renal fnction:s  cont tomonitor blood glcuse;\  DW NP    6/15:    breathing wise he feels better  his d dimer was < 150 :  he had ct chest done last night: has 6 MM RML nodule which can be followed up as an outpatient  He has left lower lobe bronchiectasis ? old infection cont Symbicort  he likely has debris in trachea  As he would need repeat ct scan chest any way in 6-8 weeks time: this can be followed up at that time   Cont steroids for a few days more: control blood glucse: endo is following  :  isch evaluation by cards:   JOSE J THOMAS     6/16:    Clinically he looks better:   no wheezing or SOB : he hasno chest pain today :  he has left lower lobe bronciectasis : stable no wheezing:  his RML nodule should be follwed up as an outpatient  He can follow with me in 4 weeks time after dc for follow up of nodules:  CASSIDY gasca discussed this with thept in detail in his own language:  DC steroids:   DVT propahyxlis  JOSE J THOMAS

## 2020-06-17 LAB
ANION GAP SERPL CALC-SCNC: 12 MMO/L — SIGNIFICANT CHANGE UP (ref 7–14)
BUN SERPL-MCNC: 36 MG/DL — HIGH (ref 7–23)
CALCIUM SERPL-MCNC: 9.2 MG/DL — SIGNIFICANT CHANGE UP (ref 8.4–10.5)
CHLORIDE SERPL-SCNC: 101 MMOL/L — SIGNIFICANT CHANGE UP (ref 98–107)
CO2 SERPL-SCNC: 28 MMOL/L — SIGNIFICANT CHANGE UP (ref 22–31)
CREAT SERPL-MCNC: 1.09 MG/DL — SIGNIFICANT CHANGE UP (ref 0.5–1.3)
GLUCOSE SERPL-MCNC: 177 MG/DL — HIGH (ref 70–99)
HCT VFR BLD CALC: 41.7 % — SIGNIFICANT CHANGE UP (ref 39–50)
HGB BLD-MCNC: 13.4 G/DL — SIGNIFICANT CHANGE UP (ref 13–17)
MAGNESIUM SERPL-MCNC: 2 MG/DL — SIGNIFICANT CHANGE UP (ref 1.6–2.6)
MCHC RBC-ENTMCNC: 27.8 PG — SIGNIFICANT CHANGE UP (ref 27–34)
MCHC RBC-ENTMCNC: 32.1 % — SIGNIFICANT CHANGE UP (ref 32–36)
MCV RBC AUTO: 86.5 FL — SIGNIFICANT CHANGE UP (ref 80–100)
NRBC # FLD: 0 K/UL — SIGNIFICANT CHANGE UP (ref 0–0)
PHOSPHATE SERPL-MCNC: 3.6 MG/DL — SIGNIFICANT CHANGE UP (ref 2.5–4.5)
PLATELET # BLD AUTO: 255 K/UL — SIGNIFICANT CHANGE UP (ref 150–400)
PMV BLD: 12.2 FL — SIGNIFICANT CHANGE UP (ref 7–13)
POTASSIUM SERPL-MCNC: 3.3 MMOL/L — LOW (ref 3.5–5.3)
POTASSIUM SERPL-SCNC: 3.3 MMOL/L — LOW (ref 3.5–5.3)
RBC # BLD: 4.82 M/UL — SIGNIFICANT CHANGE UP (ref 4.2–5.8)
RBC # FLD: 14.7 % — HIGH (ref 10.3–14.5)
SODIUM SERPL-SCNC: 141 MMOL/L — SIGNIFICANT CHANGE UP (ref 135–145)
WBC # BLD: 7.77 K/UL — SIGNIFICANT CHANGE UP (ref 3.8–10.5)
WBC # FLD AUTO: 7.77 K/UL — SIGNIFICANT CHANGE UP (ref 3.8–10.5)

## 2020-06-17 PROCEDURE — 99232 SBSQ HOSP IP/OBS MODERATE 35: CPT

## 2020-06-17 RX ORDER — POTASSIUM CHLORIDE 20 MEQ
40 PACKET (EA) ORAL EVERY 4 HOURS
Refills: 0 | Status: COMPLETED | OUTPATIENT
Start: 2020-06-17 | End: 2020-06-17

## 2020-06-17 RX ADMIN — Medication 2: at 08:46

## 2020-06-17 RX ADMIN — BUDESONIDE AND FORMOTEROL FUMARATE DIHYDRATE 2 PUFF(S): 160; 4.5 AEROSOL RESPIRATORY (INHALATION) at 08:48

## 2020-06-17 RX ADMIN — Medication 1 DROP(S): at 17:45

## 2020-06-17 RX ADMIN — Medication 40 MILLIEQUIVALENT(S): at 11:58

## 2020-06-17 RX ADMIN — Medication 7 UNIT(S): at 08:47

## 2020-06-17 RX ADMIN — ISOSORBIDE MONONITRATE 120 MILLIGRAM(S): 60 TABLET, EXTENDED RELEASE ORAL at 11:59

## 2020-06-17 RX ADMIN — Medication 40 MILLIGRAM(S): at 06:19

## 2020-06-17 RX ADMIN — RANOLAZINE 500 MILLIGRAM(S): 500 TABLET, FILM COATED, EXTENDED RELEASE ORAL at 17:45

## 2020-06-17 RX ADMIN — SENNA PLUS 2 TABLET(S): 8.6 TABLET ORAL at 22:48

## 2020-06-17 RX ADMIN — Medication 40 MILLIEQUIVALENT(S): at 15:19

## 2020-06-17 RX ADMIN — Medication 81 MILLIGRAM(S): at 11:59

## 2020-06-17 RX ADMIN — CLOPIDOGREL BISULFATE 75 MILLIGRAM(S): 75 TABLET, FILM COATED ORAL at 11:59

## 2020-06-17 RX ADMIN — BUDESONIDE AND FORMOTEROL FUMARATE DIHYDRATE 2 PUFF(S): 160; 4.5 AEROSOL RESPIRATORY (INHALATION) at 22:48

## 2020-06-17 RX ADMIN — TAMSULOSIN HYDROCHLORIDE 0.4 MILLIGRAM(S): 0.4 CAPSULE ORAL at 22:48

## 2020-06-17 RX ADMIN — Medication 2: at 12:37

## 2020-06-17 RX ADMIN — Medication 1 DROP(S): at 06:19

## 2020-06-17 RX ADMIN — ENOXAPARIN SODIUM 40 MILLIGRAM(S): 100 INJECTION SUBCUTANEOUS at 11:59

## 2020-06-17 RX ADMIN — ATORVASTATIN CALCIUM 80 MILLIGRAM(S): 80 TABLET, FILM COATED ORAL at 22:48

## 2020-06-17 RX ADMIN — Medication 20 UNIT(S): at 22:48

## 2020-06-17 RX ADMIN — Medication 7 UNIT(S): at 17:46

## 2020-06-17 RX ADMIN — RANOLAZINE 500 MILLIGRAM(S): 500 TABLET, FILM COATED, EXTENDED RELEASE ORAL at 06:19

## 2020-06-17 RX ADMIN — Medication 7 UNIT(S): at 12:37

## 2020-06-17 NOTE — PROGRESS NOTE ADULT - SUBJECTIVE AND OBJECTIVE BOX
CARDIOLOGY FOLLOW UP - Dr. Kothari    CC resting comfortably       PHYSICAL EXAM:  T(C): 36.7 (06-17-20 @ 06:17), Max: 37.3 (06-16-20 @ 13:18)  HR: 80 (06-17-20 @ 07:25) (67 - 80)  BP: 133/78 (06-17-20 @ 06:17) (133/78 - 152/62)  RR: 17 (06-17-20 @ 06:17) (16 - 17)  SpO2: 94% (06-17-20 @ 07:25) (94% - 100%)  Wt(kg): --  I&O's Summary    16 Jun 2020 07:01  -  17 Jun 2020 07:00  --------------------------------------------------------  IN: 970 mL / OUT: 2100 mL / NET: -1130 mL        Appearance: Normal	  Cardiovascular: Normal S1 S2,RRR, No JVD, No murmurs  Respiratory: Lungs clear to auscultation	  Gastrointestinal:  Soft, Non-tender, + BS	  Extremities: Normal range of motion, No clubbing, cyanosis or edema        MEDICATIONS  (STANDING):  artificial  tears Solution 1 Drop(s) Both EYES two times a day  aspirin enteric coated 81 milliGRAM(s) Oral daily  atorvastatin 80 milliGRAM(s) Oral at bedtime  budesonide 160 MICROgram(s)/formoterol 4.5 MICROgram(s) Inhaler 2 Puff(s) Inhalation two times a day  clopidogrel Tablet 75 milliGRAM(s) Oral daily  dextrose 5%. 1000 milliLiter(s) (50 mL/Hr) IV Continuous <Continuous>  dextrose 50% Injectable 12.5 Gram(s) IV Push once  dextrose 50% Injectable 25 Gram(s) IV Push once  dextrose 50% Injectable 25 Gram(s) IV Push once  enoxaparin Injectable 40 milliGRAM(s) SubCutaneous daily  furosemide    Tablet 40 milliGRAM(s) Oral daily  insulin detemir injectable (LEVEMIR) 20 Unit(s) SubCutaneous at bedtime  insulin lispro (HumaLOG) corrective regimen sliding scale   SubCutaneous three times a day before meals  insulin lispro (HumaLOG) corrective regimen sliding scale   SubCutaneous at bedtime  insulin lispro Injectable (HumaLOG) 7 Unit(s) SubCutaneous three times a day before meals  isosorbide   mononitrate ER Tablet (IMDUR) 120 milliGRAM(s) Oral daily  potassium chloride    Tablet ER 40 milliEquivalent(s) Oral every 4 hours  ranolazine 500 milliGRAM(s) Oral two times a day  senna 2 Tablet(s) Oral at bedtime  tamsulosin 0.4 milliGRAM(s) Oral at bedtime      TELEMETRY: nsr    ECG:  	  RADIOLOGY:   DIAGNOSTIC TESTING:  [ ] Echocardiogram:  [ ]  Catheterization:  [ ] Stress Test:    OTHER: 	    LABS:	 	                                13.4   7.77  )-----------( 255      ( 17 Jun 2020 06:30 )             41.7     06-17    141  |  101  |  36<H>  ----------------------------<  177<H>  3.3<L>   |  28  |  1.09    Ca    9.2      17 Jun 2020 06:30  Phos  3.6     06-17  Mg     2.0     06-17

## 2020-06-17 NOTE — PROGRESS NOTE ADULT - ASSESSMENT
70 y/o male, with a PmHx of HTN, COPD, CHF, HLD, DM, CABG, presented to the [facility] ED with SOB. Admitted to telemetry for acute on chronic chf exacerbation.    COPD exacerbation:  CHF exacerbation:   Obesity AUDI:  HTN, HLD  DM: CABG     he is wheezing : Starts steroids  cont symbicort:  Duoneb q 6 hours: Do d dimer: if high : cta chest otherwise ct chest without contrast  start bipap at night time: 10/5: 30 % fio2::   though he uses capa at home but he was hypercarbic on admission:  Controlled  Cont diuretics:  echo: : for cath eventually:   Hypercarbic resp failure: improved on bipap   DW Sesar      6/14:    feels better:    hyperglycemic:  decrease steroids:   likes the bipap n the night  for ischemia workup:  cont diuresis:  monitor renal fnction:s  cont tomonitor blood glcuse;\  DW NP    6/15:    breathing wise he feels better  his d dimer was < 150 :  he had ct chest done last night: has 6 MM RML nodule which can be followed up as an outpatient  He has left lower lobe bronchiectasis ? old infection cont Symbicort  he likely has debris in trachea  As he would need repeat ct scan chest any way in 6-8 weeks time: this can be followed up at that time   Cont steroids for a few days more: control blood glucse: endo is following  :  isch evaluation by cards:   JOSE J THOMAS     6/16:    Clinically he looks better:   no wheezing or SOB : he hasno chest pain today :  he has left lower lobe bronciectasis : stable no wheezing:  his RML nodule should be follwed up as an outpatient  He can follow with me in 4 weeks time after dc for follow up of nodules:  CASSIDY gasca discussed this with thept in detail in his own language:  DC steroids:   DVT propahyxlis  JOSE J THOMAS      6/17:  He has diffuise cad: not amenable to PCI :  his breathing did get better:  Cont curretn rx:  his d dimer wasnormal:  he has bronchiectasis and RML nodule: follow up as an outpateint:   needs P SG too: DVT propahyxlis:  Cont cardiac meds:   JOSE J THOMAS

## 2020-06-17 NOTE — PROGRESS NOTE ADULT - ATTENDING COMMENTS
[facility] NEPHROLOGY  Joey Mc M.D.  Lito Clayton D.O.  Guerda Suarez M.D.  Kelsea Nash, MSN, ANP-C  [phone]    [address] [place] [address]
Kalia Shepard will be covering for me starting 6/17/20. He can be reached at  if needed.     - Dr. DAVIE Sommer ([facility])  - [phone]
[facility] NEPHROLOGY  Joey Mc M.D.  Lito Clayton D.O.  Guerda Suarez M.D.  Kelsea Nash, MSN, ANP-C  [phone]    [address] [place] [address]
[facility] NEPHROLOGY  Joey Mc M.D.  Lito Clayton D.O.  Guerda Suarez M.D.  Kelsea Nash, MSN, ANP-C  [phone]    [address] [place] [address]
Zonia Fay MD  Pager [phone] ([facility])/ [phone] ([facility]) [please provide 10 digit call back number]  Nights and weekends: [phone]  Please note that this patient may be followed by a different provider tomorrow. If no answer or after hours, please contact [phone].  For final dc reccomendations, please call [phone] or page the endocrine fellow on call.
Patient seen and examined, agree with the above assessment and plan by ALESSANDRO Goldman.  cv stable  breathing improved  pt continues to complain of chest discomfort   plan for cath tomorrow
agree with above  cv stable  cont oral diuretics  DCP

## 2020-06-17 NOTE — PROVIDER CONTACT NOTE (OTHER) - ASSESSMENT
Patient had 4 beats of V tach.  No complaints of chest pain, palpitations or SOB. Vitals signs Bp 151/85, HR 70.

## 2020-06-17 NOTE — PROGRESS NOTE ADULT - SUBJECTIVE AND OBJECTIVE BOX
Patient is a 69y old  Male who presents with a chief complaint of Shortness of Breath (16 Jun 2020 13:23)      SUBJECTIVE / OVERNIGHT EVENTS:  feels better  tele stable  breathing has slowly gotten better day-by-day  off steroids, sugars stable.     Vital Signs Last 24 Hrs  T(C): 36.7 (17 Jun 2020 06:17), Max: 36.7 (16 Jun 2020 17:56)  T(F): 98.1 (17 Jun 2020 06:17), Max: 98.1 (16 Jun 2020 17:56)  HR: 65 (17 Jun 2020 12:00) (65 - 80)  BP: 135/86 (17 Jun 2020 12:00) (133/78 - 152/62)  BP(mean): --  RR: 18 (17 Jun 2020 11:10) (17 - 18)  SpO2: 99% (17 Jun 2020 11:10) (94% - 99%)    I&O's Summary    06-16-20 @ 07:01  -  06-17-20 @ 07:00  --------------------------------------------------------  IN: 970 mL / OUT: 2100 mL / NET: -1130 mL    06-17-20 @ 07:01  -  06-17-20 @ 13:23  --------------------------------------------------------  IN: 250 mL / OUT: 1000 mL / NET: -750 mL        PHYSICAL EXAM:  GENERAL: NAD, Comfortable, stable on room air  HEAD:  Atraumatic, Normocephalic  EYES: EOMI, PERRLA, conjunctiva and sclera clear  NECK: Supple, No JVD  CHEST/LUNG: mild decrease breath sounds bilaterally, mild basilar crackles; No wheeze   HEART: Regular rate and rhythm; No murmurs, rubs, or gallops  ABDOMEN: Soft, Nontender, Nondistended; Bowel sounds present  Neuro: AAO x 3, no focal deficit, 5/5 b/l extremities  EXTREMITIES:  2+ Peripheral Pulses, No clubbing, cyanosis, +trace edema  SKIN: No rashes or lesions    LABS:                        13.4   7.77  )-----------( 255      ( 17 Jun 2020 06:30 )             41.7     06-17    141  |  101  |  36<H>  ----------------------------<  177<H>  3.3<L>   |  28  |  1.09    Ca    9.2      17 Jun 2020 06:30  Phos  3.6     06-17  Mg     2.0     06-17        CAPILLARY BLOOD GLUCOSE      POCT Blood Glucose.: 156 mg/dL (17 Jun 2020 12:14)  POCT Blood Glucose.: 153 mg/dL (17 Jun 2020 08:17)  POCT Blood Glucose.: 343 mg/dL (16 Jun 2020 22:26)  POCT Blood Glucose.: 429 mg/dL (16 Jun 2020 17:27)  POCT Blood Glucose.: 462 mg/dL (16 Jun 2020 17:25)            RADIOLOGY & ADDITIONAL TESTS:    Imaging Personally Reviewed:  [x] YES  [ ] NO    Consultant(s) Notes Reviewed:  [x] YES  [ ] NO

## 2020-06-17 NOTE — PROGRESS NOTE ADULT - SUBJECTIVE AND OBJECTIVE BOX
Chief Complaint: DM 2    History: Patient seen at bedside. Patient reports he is feeling "ok"  Patient noted with isolated severe hyperglycemia yesterday at dinnertime to 400s. When asked, patient reports he does not remember if he ate prior to FS yesterday evening, however, noted patient has fruit at bedside  Today, BG levels significantly improved, within target, most recent 145 mg/dl  No hypoglycemia    MEDICATIONS  (STANDING):  artificial  tears Solution 1 Drop(s) Both EYES two times a day  aspirin enteric coated 81 milliGRAM(s) Oral daily  atorvastatin 80 milliGRAM(s) Oral at bedtime  budesonide 160 MICROgram(s)/formoterol 4.5 MICROgram(s) Inhaler 2 Puff(s) Inhalation two times a day  clopidogrel Tablet 75 milliGRAM(s) Oral daily  dextrose 5%. 1000 milliLiter(s) (50 mL/Hr) IV Continuous <Continuous>  dextrose 50% Injectable 12.5 Gram(s) IV Push once  dextrose 50% Injectable 25 Gram(s) IV Push once  dextrose 50% Injectable 25 Gram(s) IV Push once  enoxaparin Injectable 40 milliGRAM(s) SubCutaneous daily  furosemide    Tablet 40 milliGRAM(s) Oral daily  insulin detemir injectable (LEVEMIR) 20 Unit(s) SubCutaneous at bedtime  insulin lispro (HumaLOG) corrective regimen sliding scale   SubCutaneous three times a day before meals  insulin lispro (HumaLOG) corrective regimen sliding scale   SubCutaneous at bedtime  insulin lispro Injectable (HumaLOG) 7 Unit(s) SubCutaneous three times a day before meals  isosorbide   mononitrate ER Tablet (IMDUR) 120 milliGRAM(s) Oral daily  ranolazine 500 milliGRAM(s) Oral two times a day  senna 2 Tablet(s) Oral at bedtime  tamsulosin 0.4 milliGRAM(s) Oral at bedtime    MEDICATIONS  (PRN):  acetaminophen   Tablet .. 650 milliGRAM(s) Oral every 6 hours PRN Temp greater or equal to 38C (100.4F), Mild Pain (1 - 3), Moderate Pain (4 - 6)  albuterol/ipratropium for Nebulization 3 milliLiter(s) Nebulizer every 6 hours PRN SOB  dextrose 40% Gel 15 Gram(s) Oral once PRN Blood Glucose LESS THAN 70 milliGRAM(s)/deciliter  glucagon  Injectable 1 milliGRAM(s) IntraMuscular once PRN Glucose LESS THAN 70 milligrams/deciliter  nitroglycerin     SubLingual 0.4 milliGRAM(s) SubLingual every 5 minutes PRN Chest Pain  polyethylene glycol 3350 17 Gram(s) Oral daily PRN Constipation    No Known Allergies    Review of Systems:  HEENT: No pain  Cardiovascular: No chest pain  Respiratory: No SOB  GI: No nausea, vomiting    PHYSICAL EXAM:  VITALS: T(C): 36.7 (06-17-20 @ 06:17)  T(F): 98.1 (06-17-20 @ 06:17), Max: 98.1 (06-16-20 @ 17:56)  HR: 73 (06-17-20 @ 15:54) (65 - 80)  BP: 135/86 (06-17-20 @ 12:00) (133/78 - 152/62)  RR:  (17 - 18)  SpO2:  (94% - 99%)  Wt(kg): --  GENERAL: NAD  EYES: No proptosis, anicteric  HEENT:  Atraumatic, Normocephalic, moist mucous membranes  RESPIRATORY: unlabored respirations   PSYCH: Alert and oriented x 3, normal affect, normal mood    CAPILLARY BLOOD GLUCOSE    POCT Blood Glucose.: 145 mg/dL (17 Jun 2020 17:15)  POCT Blood Glucose.: 156 mg/dL (17 Jun 2020 12:14)  POCT Blood Glucose.: 153 mg/dL (17 Jun 2020 08:17)  POCT Blood Glucose.: 343 mg/dL (16 Jun 2020 22:26)      06-17    141  |  101  |  36<H>  ----------------------------<  177<H>  3.3<L>   |  28  |  1.09    EGFR if : 79  EGFR if non : 68    Ca    9.2      06-17  Mg     2.0     06-17  Phos  3.6     06-17        Thyroid Function Tests:  06-12 @ 15:00 TSH 1.05 FreeT4 -- T3 -- Anti TPO -- Anti Thyroglobulin Ab -- TSI --      A1C with Estimated Average Glucose: 7.8 % (06-13-20 @ 06:36)  A1C with Estimated Average Glucose: 7.9 % (06-13-20 @ 06:00)

## 2020-06-17 NOTE — PROGRESS NOTE ADULT - ASSESSMENT
70 y/o male with poorly controlled T2Dm (A1c 7.8%), HTN, HLD, CABG and CHF here with CHF exacerbation.   Noted to have hyperglycemia to the 500s with steroids (solumedrol), with a h/o nocturnal hypoglycemia at home on Levemir 20 units and Metformin 1000 mg BID. Now off steroids (last dose was Prednisone 20 mg 6/15 @ 6 AM)    1. DM 2  -A1c target 7-8%  -BG target 100-180 mg/dl, within target today  -Continue Humalog to 7 units SQ TID before meals (Hold if NPO/not eating meal)  -Continue Levemir 20 units SQ qHS (home dose)   -Continue Humalog MODERATE dose correctional scales as currently ordered, until BG consistently running below 200s - then can decrease to LOW dose, doing well on this today so will continue and re-assess tomorrow  -Check BG before meals and bedtime  -Consistent carbohydrate diet (note, patient is vegetarian)   -Recommend RD consultation    Discharge Plan:  -Would likely hold of metformin at dc given the risk of increased lactic acidosis in decompensated CHF, will need to determine closer to dc based on cath results, and workup inpatient. Per consult, patient would prefer to continue Levemir and Metformin 1000 mg BID and provide our recommendation to his endocrinologist at [facility] and then decide to change his regimen - TBD  -Followup with endocrinologist at [facility]     2. HTN   -Goal BP <130/80 in DM  -Management as per primary team and cardiology     3. HLD  -Goal LDL <70 in DM   -Recommend statin as per cardiology recommendations, currently on Lipitor 80mg     Christina Bowles  Nurse Practitioner  Division of Endocrinology & Diabetes  In house pager #[phone]/long range pager #[phone]    If before 9AM or after 6PM, or on weekends/holidays, please call endocrine answering service for assistance ([phone]).  For nonurgent matters email [email or web address] for assistance.

## 2020-06-17 NOTE — PROGRESS NOTE ADULT - SUBJECTIVE AND OBJECTIVE BOX
Patient is a 69y old  Male who presents with a chief complaint of Shortness of Breath (17 Jun 2020 10:17)      Any change in ROS: He is feelingb checo:    MEDICATIONS  (STANDING):  artificial  tears Solution 1 Drop(s) Both EYES two times a day  aspirin enteric coated 81 milliGRAM(s) Oral daily  atorvastatin 80 milliGRAM(s) Oral at bedtime  budesonide 160 MICROgram(s)/formoterol 4.5 MICROgram(s) Inhaler 2 Puff(s) Inhalation two times a day  clopidogrel Tablet 75 milliGRAM(s) Oral daily  dextrose 5%. 1000 milliLiter(s) (50 mL/Hr) IV Continuous <Continuous>  dextrose 50% Injectable 12.5 Gram(s) IV Push once  dextrose 50% Injectable 25 Gram(s) IV Push once  dextrose 50% Injectable 25 Gram(s) IV Push once  enoxaparin Injectable 40 milliGRAM(s) SubCutaneous daily  furosemide    Tablet 40 milliGRAM(s) Oral daily  insulin detemir injectable (LEVEMIR) 20 Unit(s) SubCutaneous at bedtime  insulin lispro (HumaLOG) corrective regimen sliding scale   SubCutaneous three times a day before meals  insulin lispro (HumaLOG) corrective regimen sliding scale   SubCutaneous at bedtime  insulin lispro Injectable (HumaLOG) 7 Unit(s) SubCutaneous three times a day before meals  isosorbide   mononitrate ER Tablet (IMDUR) 120 milliGRAM(s) Oral daily  potassium chloride    Tablet ER 40 milliEquivalent(s) Oral every 4 hours  ranolazine 500 milliGRAM(s) Oral two times a day  senna 2 Tablet(s) Oral at bedtime  tamsulosin 0.4 milliGRAM(s) Oral at bedtime    MEDICATIONS  (PRN):  acetaminophen   Tablet .. 650 milliGRAM(s) Oral every 6 hours PRN Temp greater or equal to 38C (100.4F), Mild Pain (1 - 3), Moderate Pain (4 - 6)  albuterol/ipratropium for Nebulization 3 milliLiter(s) Nebulizer every 6 hours PRN SOB  dextrose 40% Gel 15 Gram(s) Oral once PRN Blood Glucose LESS THAN 70 milliGRAM(s)/deciliter  glucagon  Injectable 1 milliGRAM(s) IntraMuscular once PRN Glucose LESS THAN 70 milligrams/deciliter  nitroglycerin     SubLingual 0.4 milliGRAM(s) SubLingual every 5 minutes PRN Chest Pain  polyethylene glycol 3350 17 Gram(s) Oral daily PRN Constipation    Vital Signs Last 24 Hrs  T(C): 36.7 (17 Jun 2020 06:17), Max: 37.3 (16 Jun 2020 13:18)  T(F): 98.1 (17 Jun 2020 06:17), Max: 99.1 (16 Jun 2020 13:18)  HR: 65 (17 Jun 2020 12:00) (65 - 80)  BP: 135/86 (17 Jun 2020 12:00) (133/78 - 152/62)  BP(mean): --  RR: 18 (17 Jun 2020 11:10) (17 - 18)  SpO2: 99% (17 Jun 2020 11:10) (94% - 99%)    I&O's Summary    16 Jun 2020 07:01  -  17 Jun 2020 07:00  --------------------------------------------------------  IN: 970 mL / OUT: 2100 mL / NET: -1130 mL          Physical Exam:   GENERAL: NAD, well-groomed, well-developed  HEENT: KAZ/   Atraumatic, Normocephalic  ENMT: No tonsillar erythema, exudates, or enlargement; Moist mucous membranes, Good dentition, No lesions  NECK: Supple, No JVD, Normal thyroid  CHEST/LUNG: Clear to auscultaion, ; No rales, rhonchi, wheezing, or rubs  CVS: Regular rate and rhythm; No murmurs, rubs, or gallops  GI: : Soft, Nontender, Nondistended; Bowel sounds present  NERVOUS SYSTEM:  Alert & Oriented X3  EXTREMITIES:  2+ Peripheral Pulses, No clubbing, cyanosis, or edema  LYMPH: No lymphadenopathy noted  SKIN: No rashes or lesions  ENDOCRINOLOGY: No Thyromegaly  PSYCH: Appropriate    Labs:  23, 21, 20                            13.4   7.77  )-----------( 255      ( 17 Jun 2020 06:30 )             41.7                         12.7   7.51  )-----------( 259      ( 16 Jun 2020 05:40 )             39.1                         12.5   10.79 )-----------( 245      ( 15 Roberto 2020 06:22 )             37.9                         12.2   8.86  )-----------( 244      ( 14 Jun 2020 06:52 )             37.2     06-17    141  |  101  |  36<H>  ----------------------------<  177<H>  3.3<L>   |  28  |  1.09  06-16    143  |  104  |  42<H>  ----------------------------<  221<H>  3.7   |  25  |  1.00  06-15    142  |  104  |  49<H>  ----------------------------<  228<H>  3.6   |  26  |  1.09  06-14    140  |  99  |  52<H>  ----------------------------<  294<H>  3.8   |  25  |  1.52<H>  06-13    137  |  96<L>  |  53<H>  ----------------------------<  517<HH>  4.5   |  24  |  1.79<H>    Ca    9.2      17 Jun 2020 06:30  Ca    9.2      16 Jun 2020 05:40  Phos  3.6     06-17  Phos  4.0     06-16  Mg     2.0     06-17  Mg     2.3     06-16      CAPILLARY BLOOD GLUCOSE      POCT Blood Glucose.: 156 mg/dL (17 Jun 2020 12:14)  POCT Blood Glucose.: 153 mg/dL (17 Jun 2020 08:17)  POCT Blood Glucose.: 343 mg/dL (16 Jun 2020 22:26)  POCT Blood Glucose.: 429 mg/dL (16 Jun 2020 17:27)  POCT Blood Glucose.: 462 mg/dL (16 Jun 2020 17:25)            D-Dimer Assay, Quantitative: < 150 ng/mL (06-13 @ 19:30)    < from: CT Chest No Cont (06.14.20 @ 14:09) >      PROCEDURE DATE:  Jun 14 2020         INTERPRETATION:  CLINICAL INFORMATION: Shortness of breath. Heart failure and COPD.    COMPARISON: CT abdomen and pelvis 9/23/2014..    PROCEDURE:   CT of the Chest was performed without intravenous contrast.  Sagittal and coronal reformats were performed.    FINDINGS:    LUNGS AND AIRWAYS: There is cystic bronchiectasis in the left lower lobe. There are no lung nodules or consolidations. There is linear and nodular opacity in the trachea extending into the left mainstem bronchus which likely represents mucus/debris. Short-term follow-up is recommended to exclude a polyp.     PLEURA: No pleural effusion. No pneumothorax.  MEDIASTINUM AND JAE: No lymphadenopathy.  VESSELS: Atherosclerotic changes of the aorta and coronary arteries.  HEART: Heart size is enlarged. Mitral annular calcifications. No pericardial effusion. Status post CABG.  CHEST WALL AND LOWER NECK: Status post median sternotomy.  VISUALIZED UPPER ABDOMEN: Within normal limits.  BONES: Degenerative changes. Sternotomy.    IMPRESSION:   A 6 mm right middle lobe nodule. Consider follow-up chest CT in 3-6 months.    There is cystic bronchiectasis in the left lower lobe. There are no lung nodules or consolidations. There is linear and nodular opacity in the trachea extending into the left mainstem bronchus which likely represents mucus/debris. Follow-up is recommended to exclude a polyp.              ADIEL RUIZ M.D., RADIOLOGY RESIDENT  This document has been electronically signed.  MYKE SOLER M.D., ATTENDING RADIOLOGIST  This document has been electronically signed. Jun 14 2020  2:27PM        < end of copied text >      RECENT CULTURES:        RESPIRATORY CULTURES:          Studies  Chest X-RAY  CT SCAN Chest   Venous Dopplers: LE:   CT Abdomen  Others

## 2020-06-17 NOTE — PROGRESS NOTE ADULT - ASSESSMENT
68 y/o male, with a PmHx of HTN, COPD, CHF, HLD, DM, CABG, presented to the [facility] ED with SOB. Admitted to telemetry for acute on chronic chf exacerbation.    1. CHF exacerbation  -echo showing distal apex severely hypokinetic/akinetic, LVEF 50-55%  -volume status stable, continue lasix as ordered     2. CAD s/p CABG  pt s/p cath diffuse small vessel CAD not amenable to PCI  cont ranexa, imdur  cont dap    3. HTN  -cont current meds    4. DM  -mgmt per medicine, endocrine     DVT ppx

## 2020-06-18 ENCOUNTER — TRANSCRIPTION ENCOUNTER (OUTPATIENT)
Age: 70
End: 2020-06-18

## 2020-06-18 VITALS
DIASTOLIC BLOOD PRESSURE: 83 MMHG | HEART RATE: 69 BPM | OXYGEN SATURATION: 98 % | RESPIRATION RATE: 16 BRPM | TEMPERATURE: 98 F | SYSTOLIC BLOOD PRESSURE: 129 MMHG

## 2020-06-18 PROBLEM — J44.9 CHRONIC OBSTRUCTIVE PULMONARY DISEASE, UNSPECIFIED: Chronic | Status: ACTIVE | Noted: 2020-06-12

## 2020-06-18 PROBLEM — I50.9 HEART FAILURE, UNSPECIFIED: Chronic | Status: ACTIVE | Noted: 2020-06-12

## 2020-06-18 LAB
ANION GAP SERPL CALC-SCNC: 13 MMO/L — SIGNIFICANT CHANGE UP (ref 7–14)
BUN SERPL-MCNC: 33 MG/DL — HIGH (ref 7–23)
CALCIUM SERPL-MCNC: 9.1 MG/DL — SIGNIFICANT CHANGE UP (ref 8.4–10.5)
CHLORIDE SERPL-SCNC: 103 MMOL/L — SIGNIFICANT CHANGE UP (ref 98–107)
CO2 SERPL-SCNC: 24 MMOL/L — SIGNIFICANT CHANGE UP (ref 22–31)
CREAT SERPL-MCNC: 1.04 MG/DL — SIGNIFICANT CHANGE UP (ref 0.5–1.3)
GLUCOSE SERPL-MCNC: 122 MG/DL — HIGH (ref 70–99)
HCT VFR BLD CALC: 41.5 % — SIGNIFICANT CHANGE UP (ref 39–50)
HGB BLD-MCNC: 13.4 G/DL — SIGNIFICANT CHANGE UP (ref 13–17)
LACTATE SERPL-SCNC: 1.7 MMOL/L — SIGNIFICANT CHANGE UP (ref 0.5–2)
MAGNESIUM SERPL-MCNC: 2.1 MG/DL — SIGNIFICANT CHANGE UP (ref 1.6–2.6)
MCHC RBC-ENTMCNC: 28 PG — SIGNIFICANT CHANGE UP (ref 27–34)
MCHC RBC-ENTMCNC: 32.3 % — SIGNIFICANT CHANGE UP (ref 32–36)
MCV RBC AUTO: 86.6 FL — SIGNIFICANT CHANGE UP (ref 80–100)
NRBC # FLD: 0 K/UL — SIGNIFICANT CHANGE UP (ref 0–0)
PHOSPHATE SERPL-MCNC: 3.8 MG/DL — SIGNIFICANT CHANGE UP (ref 2.5–4.5)
PLATELET # BLD AUTO: 245 K/UL — SIGNIFICANT CHANGE UP (ref 150–400)
PMV BLD: 11.7 FL — SIGNIFICANT CHANGE UP (ref 7–13)
POTASSIUM SERPL-MCNC: 3.9 MMOL/L — SIGNIFICANT CHANGE UP (ref 3.5–5.3)
POTASSIUM SERPL-SCNC: 3.9 MMOL/L — SIGNIFICANT CHANGE UP (ref 3.5–5.3)
RBC # BLD: 4.79 M/UL — SIGNIFICANT CHANGE UP (ref 4.2–5.8)
RBC # FLD: 14.6 % — HIGH (ref 10.3–14.5)
SODIUM SERPL-SCNC: 140 MMOL/L — SIGNIFICANT CHANGE UP (ref 135–145)
WBC # BLD: 9.46 K/UL — SIGNIFICANT CHANGE UP (ref 3.8–10.5)
WBC # FLD AUTO: 9.46 K/UL — SIGNIFICANT CHANGE UP (ref 3.8–10.5)

## 2020-06-18 PROCEDURE — 99232 SBSQ HOSP IP/OBS MODERATE 35: CPT

## 2020-06-18 RX ORDER — METFORMIN HYDROCHLORIDE 850 MG/1
1 TABLET ORAL
Qty: 60 | Refills: 0
Start: 2020-06-18 | End: 2020-07-17

## 2020-06-18 RX ORDER — BUDESONIDE AND FORMOTEROL FUMARATE DIHYDRATE 160; 4.5 UG/1; UG/1
2 AEROSOL RESPIRATORY (INHALATION)
Qty: 0 | Refills: 0 | DISCHARGE

## 2020-06-18 RX ORDER — RANOLAZINE 500 MG/1
1 TABLET, FILM COATED, EXTENDED RELEASE ORAL
Qty: 60 | Refills: 0
Start: 2020-06-18 | End: 2020-07-17

## 2020-06-18 RX ORDER — METFORMIN HYDROCHLORIDE 850 MG/1
1 TABLET ORAL
Qty: 0 | Refills: 0 | DISCHARGE

## 2020-06-18 RX ORDER — CLOPIDOGREL BISULFATE 75 MG/1
1 TABLET, FILM COATED ORAL
Qty: 30 | Refills: 0
Start: 2020-06-18 | End: 2020-07-17

## 2020-06-18 RX ORDER — TAMSULOSIN HYDROCHLORIDE 0.4 MG/1
1 CAPSULE ORAL
Qty: 0 | Refills: 0 | DISCHARGE

## 2020-06-18 RX ORDER — ATORVASTATIN CALCIUM 80 MG/1
1 TABLET, FILM COATED ORAL
Qty: 30 | Refills: 0
Start: 2020-06-18 | End: 2020-07-17

## 2020-06-18 RX ORDER — INSULIN DETEMIR 100/ML (3)
20 INSULIN PEN (ML) SUBCUTANEOUS
Qty: 5 | Refills: 0
Start: 2020-06-18 | End: 2020-07-17

## 2020-06-18 RX ORDER — SENNA PLUS 8.6 MG/1
2 TABLET ORAL
Qty: 0 | Refills: 0 | DISCHARGE
Start: 2020-06-18

## 2020-06-18 RX ORDER — ALBUTEROL 90 UG/1
2 AEROSOL, METERED ORAL
Qty: 1 | Refills: 0
Start: 2020-06-18 | End: 2020-07-17

## 2020-06-18 RX ORDER — BUDESONIDE AND FORMOTEROL FUMARATE DIHYDRATE 160; 4.5 UG/1; UG/1
2 AEROSOL RESPIRATORY (INHALATION)
Qty: 1 | Refills: 0
Start: 2020-06-18 | End: 2020-07-17

## 2020-06-18 RX ORDER — ISOSORBIDE MONONITRATE 60 MG/1
1 TABLET, EXTENDED RELEASE ORAL
Qty: 30 | Refills: 0
Start: 2020-06-18 | End: 2020-07-17

## 2020-06-18 RX ORDER — TAMSULOSIN HYDROCHLORIDE 0.4 MG/1
1 CAPSULE ORAL
Qty: 30 | Refills: 0
Start: 2020-06-18 | End: 2020-07-17

## 2020-06-18 RX ORDER — ALBUTEROL 90 UG/1
2 AEROSOL, METERED ORAL
Qty: 0 | Refills: 0 | DISCHARGE

## 2020-06-18 RX ORDER — FUROSEMIDE 40 MG
1 TABLET ORAL
Qty: 30 | Refills: 0
Start: 2020-06-18 | End: 2020-07-17

## 2020-06-18 RX ORDER — INSULIN DETEMIR 100/ML (3)
20 INSULIN PEN (ML) SUBCUTANEOUS
Qty: 0 | Refills: 0 | DISCHARGE

## 2020-06-18 RX ORDER — SENNA PLUS 8.6 MG/1
2 TABLET ORAL
Qty: 60 | Refills: 0
Start: 2020-06-18

## 2020-06-18 RX ORDER — ATORVASTATIN CALCIUM 80 MG/1
1 TABLET, FILM COATED ORAL
Qty: 0 | Refills: 0 | DISCHARGE

## 2020-06-18 RX ORDER — ISOSORBIDE MONONITRATE 60 MG/1
1 TABLET, EXTENDED RELEASE ORAL
Qty: 0 | Refills: 0 | DISCHARGE

## 2020-06-18 RX ADMIN — Medication 1 DROP(S): at 05:30

## 2020-06-18 RX ADMIN — Medication 4: at 18:07

## 2020-06-18 RX ADMIN — Medication 1 DROP(S): at 18:09

## 2020-06-18 RX ADMIN — Medication 81 MILLIGRAM(S): at 12:21

## 2020-06-18 RX ADMIN — RANOLAZINE 500 MILLIGRAM(S): 500 TABLET, FILM COATED, EXTENDED RELEASE ORAL at 18:10

## 2020-06-18 RX ADMIN — Medication 7 UNIT(S): at 18:08

## 2020-06-18 RX ADMIN — ISOSORBIDE MONONITRATE 120 MILLIGRAM(S): 60 TABLET, EXTENDED RELEASE ORAL at 12:20

## 2020-06-18 RX ADMIN — Medication 40 MILLIGRAM(S): at 05:30

## 2020-06-18 RX ADMIN — Medication 7 UNIT(S): at 09:06

## 2020-06-18 RX ADMIN — CLOPIDOGREL BISULFATE 75 MILLIGRAM(S): 75 TABLET, FILM COATED ORAL at 12:20

## 2020-06-18 RX ADMIN — Medication 7 UNIT(S): at 12:19

## 2020-06-18 RX ADMIN — BUDESONIDE AND FORMOTEROL FUMARATE DIHYDRATE 2 PUFF(S): 160; 4.5 AEROSOL RESPIRATORY (INHALATION) at 09:03

## 2020-06-18 RX ADMIN — Medication 2: at 12:19

## 2020-06-18 RX ADMIN — RANOLAZINE 500 MILLIGRAM(S): 500 TABLET, FILM COATED, EXTENDED RELEASE ORAL at 05:30

## 2020-06-18 RX ADMIN — ENOXAPARIN SODIUM 40 MILLIGRAM(S): 100 INJECTION SUBCUTANEOUS at 12:27

## 2020-06-18 NOTE — PROGRESS NOTE ADULT - ASSESSMENT
70 y/o male with poorly controlled T2Dm (A1c 7.8%), HTN, HLD, CABG and CHF here with CHF exacerbation.   Noted to have hyperglycemia to the 500s with steroids (solumedrol), with a h/o nocturnal hypoglycemia at home on Levemir 20 units and Metformin 1000 mg BID. Now off steroids (last dose was Prednisone 20 mg 6/15 @ 6 AM)    1. DM 2  -A1c target 7-8%  -BG target 100-180 mg/dl, within target today  -Continue Humalog to 7 units SQ TID before meals (Hold if NPO/not eating meal)  -Continue Levemir 20 units SQ qHS (home dose)   -Continue Humalog MODERATE dose correctional scales as currently ordered  -Check BG before meals and bedtime  -Consistent carbohydrate diet (note, patient is vegetarian)   -Recommend RD consultation    Discharge Plan:  -Patient would prefer to continue Levemir and Metformin 1000 mg PO BID, followup with endocrinologist at [facility] and then decide to change his regimen. He has CHF, but EF is 50-55%. GFR is 72 today. Lactate level is 1.7.   -Will need close monitoring on Metformin, but ok to discharge on home regimen for now (Levemir 20 units SQ qHS and Metformin 1000 mg PO BID)  -Followup with endocrinologist at [facility] - emphasized importance of followup. Patient does not remember endocrinologist last name.    2. HTN   -Goal BP <130/80 in DM  -Management as per primary team and cardiology     3. HLD  -Goal LDL <70 in DM   -Recommend statin as per cardiology recommendations, currently on Lipitor 80mg     Reviewed with primary team  Christina Bowles  Nurse Practitioner  Division of Endocrinology & Diabetes  In house pager #[phone]/long range pager #[phone]    If before 9AM or after 6PM, or on weekends/holidays, please call endocrine answering service for assistance ([phone]).  For nonurgent matters email [email or web address] for assistance.

## 2020-06-18 NOTE — PROGRESS NOTE ADULT - REASON FOR ADMISSION

## 2020-06-18 NOTE — PROGRESS NOTE ADULT - SUBJECTIVE AND OBJECTIVE BOX
Chief Complaint: DM 2    History: Patient seen at bedside. Reports he is eating meals, denies n/v, denies s/s of hypoglycemia  Repeated lactate level today- resulted 1.7  Most recent  mg/dl  No hypoglycemia     MEDICATIONS  (STANDING):  artificial  tears Solution 1 Drop(s) Both EYES two times a day  aspirin enteric coated 81 milliGRAM(s) Oral daily  atorvastatin 80 milliGRAM(s) Oral at bedtime  budesonide 160 MICROgram(s)/formoterol 4.5 MICROgram(s) Inhaler 2 Puff(s) Inhalation two times a day  clopidogrel Tablet 75 milliGRAM(s) Oral daily  dextrose 5%. 1000 milliLiter(s) (50 mL/Hr) IV Continuous <Continuous>  dextrose 50% Injectable 12.5 Gram(s) IV Push once  dextrose 50% Injectable 25 Gram(s) IV Push once  dextrose 50% Injectable 25 Gram(s) IV Push once  enoxaparin Injectable 40 milliGRAM(s) SubCutaneous daily  furosemide    Tablet 40 milliGRAM(s) Oral daily  insulin detemir injectable (LEVEMIR) 20 Unit(s) SubCutaneous at bedtime  insulin lispro (HumaLOG) corrective regimen sliding scale   SubCutaneous three times a day before meals  insulin lispro (HumaLOG) corrective regimen sliding scale   SubCutaneous at bedtime  insulin lispro Injectable (HumaLOG) 7 Unit(s) SubCutaneous three times a day before meals  isosorbide   mononitrate ER Tablet (IMDUR) 120 milliGRAM(s) Oral daily  ranolazine 500 milliGRAM(s) Oral two times a day  senna 2 Tablet(s) Oral at bedtime  tamsulosin 0.4 milliGRAM(s) Oral at bedtime    MEDICATIONS  (PRN):  acetaminophen   Tablet .. 650 milliGRAM(s) Oral every 6 hours PRN Temp greater or equal to 38C (100.4F), Mild Pain (1 - 3), Moderate Pain (4 - 6)  albuterol/ipratropium for Nebulization 3 milliLiter(s) Nebulizer every 6 hours PRN SOB  dextrose 40% Gel 15 Gram(s) Oral once PRN Blood Glucose LESS THAN 70 milliGRAM(s)/deciliter  glucagon  Injectable 1 milliGRAM(s) IntraMuscular once PRN Glucose LESS THAN 70 milligrams/deciliter  nitroglycerin     SubLingual 0.4 milliGRAM(s) SubLingual every 5 minutes PRN Chest Pain  polyethylene glycol 3350 17 Gram(s) Oral daily PRN Constipation    No Known Allergies    Review of Systems:  HEENT: No pain  Cardiovascular: No chest pain  Respiratory: No SOB  GI: No nausea, vomiting    PHYSICAL EXAM:  VITALS: T(C): 36.8 (06-18-20 @ 12:15)  T(F): 98.2 (06-18-20 @ 12:15), Max: 98.2 (06-18-20 @ 12:15)  HR: 69 (06-18-20 @ 12:15) (62 - 80)  BP: 129/83 (06-18-20 @ 12:15) (118/81 - 136/88)  RR:  (16 - 100)  SpO2:  (95% - 100%)  Wt(kg): --  GENERAL: NAD  EYES: No proptosis, no lid lag, anicteric  HEENT:  Atraumatic, Normocephalic, moist mucous membranes  RESPIRATORY: unlabored respirations   PSYCH: Alert and oriented x 3, normal affect, normal mood    CAPILLARY BLOOD GLUCOSE    POCT Blood Glucose.: 156 mg/dL (18 Jun 2020 12:15)  POCT Blood Glucose.: 117 mg/dL (18 Jun 2020 08:25)  POCT Blood Glucose.: 220 mg/dL (17 Jun 2020 22:28)  POCT Blood Glucose.: 145 mg/dL (17 Jun 2020 17:15)      06-18    140  |  103  |  33<H>  ----------------------------<  122<H>  3.9   |  24  |  1.04    EGFR if : 83  EGFR if non : 72    Ca    9.1      06-18  Mg     2.1     06-18  Phos  3.8     06-18        Thyroid Function Tests:  06-12 @ 15:00 TSH 1.05 FreeT4 -- T3 -- Anti TPO -- Anti Thyroglobulin Ab -- TSI --      A1C with Estimated Average Glucose: 7.8 % (06-13-20 @ 06:36)  A1C with Estimated Average Glucose: 7.9 % (06-13-20 @ 06:00)

## 2020-06-18 NOTE — PROGRESS NOTE ADULT - ASSESSMENT
68 y/o male, with a PmHx of HTN, COPD, CHF, HLD, DM, CABG, presented to the [facility] ED with SOB. Admitted to telemetry for acute on chronic chf exacerbation.    1. CHF exacerbation  -echo showing distal apex severely hypokinetic/akinetic, LVEF 50-55%  -volume status stable, continue lasix as ordered     2. CAD s/p CABG  pt s/p cath diffuse small vessel CAD not amenable to PCI  cont ranexa, imdur  cont dap    3. HTN  -cont current meds    4. DM  -mgmt per medicine, endocrine     DVT ppx    DC home

## 2020-06-18 NOTE — PROGRESS NOTE ADULT - SUBJECTIVE AND OBJECTIVE BOX
Patient is a 69y old  Male who presents with a chief complaint of Shortness of Breath (16 Jun 2020 13:23)      SUBJECTIVE / OVERNIGHT EVENTS:  saturating well on RA  he feels well     Vital Signs Last 24 Hrs  T(C): 36.8 (18 Jun 2020 12:15), Max: 36.8 (18 Jun 2020 12:15)  T(F): 98.2 (18 Jun 2020 12:15), Max: 98.2 (18 Jun 2020 12:15)  HR: 69 (18 Jun 2020 12:15) (62 - 79)  BP: 129/83 (18 Jun 2020 12:15) (118/81 - 136/88)  BP(mean): --  RR: 16 (18 Jun 2020 12:15) (16 - 100)  SpO2: 98% (18 Jun 2020 12:15) (96% - 100%)    I&O's Summary    06-17-20 @ 07:01  -  06-18-20 @ 07:00  --------------------------------------------------------  IN: 1000 mL / OUT: 1800 mL / NET: -800 mL    06-18-20 @ 07:01  -  06-18-20 @ 15:54  --------------------------------------------------------  IN: 450 mL / OUT: 1000 mL / NET: -550 mL        PHYSICAL EXAM:  GENERAL: NAD, Comfortable, stable on room air  HEAD:  Atraumatic, Normocephalic  EYES: EOMI, PERRLA, conjunctiva and sclera clear  NECK: Supple, No JVD  CHEST/LUNG: mild decrease breath sounds bilaterally, no rales or wheezes  HEART: Regular rate and rhythm; No murmurs, rubs, or gallops  ABDOMEN: Soft, Nontender, Nondistended; Bowel sounds present  Neuro: AAO x 3, no focal deficits, 5/5 b/l extremities  EXTREMITIES:  2+ Peripheral Pulses, No clubbing, cyanosis, +trace edema  SKIN: No rashes or lesions    LABS:                        13.4   9.46  )-----------( 245      ( 18 Jun 2020 07:30 )             41.5     06-18    140  |  103  |  33<H>  ----------------------------<  122<H>  3.9   |  24  |  1.04    Ca    9.1      18 Jun 2020 07:30  Phos  3.8     06-18  Mg     2.1     06-18        CAPILLARY BLOOD GLUCOSE      POCT Blood Glucose.: 156 mg/dL (18 Jun 2020 12:15)  POCT Blood Glucose.: 117 mg/dL (18 Jun 2020 08:25)  POCT Blood Glucose.: 220 mg/dL (17 Jun 2020 22:28)  POCT Blood Glucose.: 145 mg/dL (17 Jun 2020 17:15)            RADIOLOGY & ADDITIONAL TESTS:    Imaging Personally Reviewed:  [x] YES  [ ] NO    Consultant(s) Notes Reviewed:  [x] YES  [ ] NO

## 2020-06-18 NOTE — PROGRESS NOTE ADULT - SUBJECTIVE AND OBJECTIVE BOX
CC: no cp/sob    TELEMETRY:     PHYSICAL EXAM:    T(C): 36.8 (06-18-20 @ 12:15), Max: 36.8 (06-18-20 @ 12:15)  HR: 69 (06-18-20 @ 12:15) (62 - 80)  BP: 129/83 (06-18-20 @ 12:15) (118/81 - 136/88)  RR: 16 (06-18-20 @ 12:15) (16 - 100)  SpO2: 98% (06-18-20 @ 12:15) (95% - 100%)  Wt(kg): --  I&O's Summary    17 Jun 2020 07:01  -  18 Jun 2020 07:00  --------------------------------------------------------  IN: 1000 mL / OUT: 1800 mL / NET: -800 mL    18 Jun 2020 07:01  -  18 Jun 2020 15:10  --------------------------------------------------------  IN: 450 mL / OUT: 1000 mL / NET: -550 mL        Appearance: Normal	  Cardiovascular: Normal S1 S2,RRR, No JVD, No murmurs  Respiratory: Lungs clear to auscultation	  Gastrointestinal:  Soft, Non-tender, + BS	  Extremities: Normal range of motion, No clubbing, cyanosis or edema  Vascular: Peripheral pulses palpable 2+ bilaterally     LABS:	 	                          13.4   9.46  )-----------( 245      ( 18 Jun 2020 07:30 )             41.5     06-18    140  |  103  |  33<H>  ----------------------------<  122<H>  3.9   |  24  |  1.04    Ca    9.1      18 Jun 2020 07:30  Phos  3.8     06-18  Mg     2.1     06-18            CARDIAC MARKERS:

## 2020-06-18 NOTE — PROGRESS NOTE ADULT - NSHPATTENDINGPLANDISCUSS_GEN_ALL_CORE
PA
paged team at [phone] to discuss, awaiting call back
pt. called the son Sophia  per request, not picking up.
pt and discussed with the son Sophia . copies of results provided to the pt per request.

## 2020-06-18 NOTE — DISCHARGE NOTE PROVIDER - NSDCCPCAREPLAN_GEN_ALL_CORE_FT
PRINCIPAL DISCHARGE DIAGNOSIS  Diagnosis: Acute on chronic heart failure  Assessment and Plan of Treatment: Low salt intake. Restrict fluid intake based on your doctors recommendations. Monitor daily weights. If you note weight gain >3-4lbs in one week, follow up with your doctor or return to the hospital immediately. Follow up with your cardiologist as outpatient in 1-2 weeks for further monitoring. Please call for appointment.      SECONDARY DISCHARGE DIAGNOSES  Diagnosis: HLD (hyperlipidemia)  Assessment and Plan of Treatment: Ensure compliance with your medications as directed. Low cholesterol diet. Follow up with your primary care physician for further monitoring in 1-2 weeks. Please call to arrange appointment.    Diagnosis: HTN (hypertension)  Assessment and Plan of Treatment: Ensure compliance with your medications as directed. Low sodium diet. Follow up with your primary care physician for further monitoring in 1-2 weeks. Please call to arrange appointment.    Diagnosis: Type 2 diabetes mellitus with hyperglycemia, with long-term current use of insulin  Assessment and Plan of Treatment: Follow up with your primary care physician for further monitoring in 1-2 weeks. Please call to arrange appointment. Continue diet modification. Avoid complex carbohydrates such as bread, pasta, cereal, white rice, white potatoes, etc. Avoid concentrated sugar as found in desserts, candy, soda, juice, etc. Consume a diet based on lean protein (chicken, fish) and vegetables.  Please check you fingersticks every morning, or if you are not feeling well and before meals.  If your fingerstick is >300 x 2 or more readings. Please contact primary doctor or endocrinologist.  If your fingerstick is less than 70 and/or you have symptoms of very low blood sugar, FIRST drink 1/2 cup of apple juice (or take 4 glucose tabs) and recheck fingerstick in 15 minutes. Repeat these steps until blood sugar is above 100, if necessary then call your doctor to discuss low blood sugar.    Diagnosis: Chronic obstructive pulmonary disease, unspecified COPD type  Assessment and Plan of Treatment: Follow up with your pulmonolgist Dr. Rock for further monitoring in 1-2 weeks. Please call to arrange appointment. Ensure compliance with your medications and inhalers as directed.  You have a nodule in the right middle lung that should be monitored as outpatient by your pulmonologist. Follow up with Dr. Rock for further direction. PRINCIPAL DISCHARGE DIAGNOSIS  Diagnosis: Acute on chronic heart failure  Assessment and Plan of Treatment: Low salt intake. Restrict fluid intake based on your doctors recommendations. Monitor daily weights. If you note weight gain >3-4lbs in one week, follow up with your doctor or return to the hospital immediately. Follow up with your Cardiologist at [facility] for further monitoring in 1-2 weeks. Please call to arrange appointment.      SECONDARY DISCHARGE DIAGNOSES  Diagnosis: HLD (hyperlipidemia)  Assessment and Plan of Treatment: Ensure compliance with your medications as directed. Low cholesterol diet. Follow up with your Cardiologist at [facility] for further monitoring in 1-2 weeks. Please call to arrange appointment.    Diagnosis: HTN (hypertension)  Assessment and Plan of Treatment: Ensure compliance with your medications as directed. Low sodium diet. Follow up with your primary care physician at [facility] for further monitoring in 1-2 weeks. Please call to arrange appointment.    Diagnosis: Type 2 diabetes mellitus with hyperglycemia, with long-term current use of insulin  Assessment and Plan of Treatment: Follow up with your Endocrinologist at [facility] for further monitoring in 1-2 weeks. Please call to arrange appointment. Continue Levemir and Metformin as directed. Continue diet modification. Avoid complex carbohydrates such as bread, pasta, cereal, white rice, white potatoes, etc. Avoid concentrated sugar as found in desserts, candy, soda, juice, etc. Consume a diet based on lean protein (chicken, fish) and vegetables.  Please check you fingersticks every morning, or if you are not feeling well and before meals.  If your fingerstick is >300 x 2 or more readings. Please contact primary doctor or endocrinologist.  If your fingerstick is less than 70 and/or you have symptoms of very low blood sugar, FIRST drink 1/2 cup of apple juice (or take 4 glucose tabs) and recheck fingerstick in 15 minutes. Repeat these steps until blood sugar is above 100, if necessary then call your doctor to discuss low blood sugar.    Diagnosis: Chronic obstructive pulmonary disease, unspecified COPD type  Assessment and Plan of Treatment: Follow up with your pulmonolgist Dr. Rock for further monitoring in 1-2 weeks. Please call to arrange appointment. Ensure compliance with your medications and inhalers as directed. You will need outpt pulmonary function tests.   You have a nodule in the right middle lung that should be monitored as outpatient by your pulmonologist. Follow up with Dr. Rock for further direction.

## 2020-06-18 NOTE — PROGRESS NOTE ADULT - ASSESSMENT
69 y/o male with PMHx of HTN, T2DM, CAD s/p CABG 09/2014 & GUILLE to ostial (09/2015), % with patent grafts (with patent LIMA to LAD and patent SVG to diag), recent left Heart cath with nonobstructive disease in January 2019, presenting with worsening SOB for several days, +cough. He reports orthopnea  and worsening LE swelling.      # Dyspnea likely acute on chronic heart failure in the setting of volume overload with bibasilar crackles  s/p Lasix 40 x1 in ED  diurese per cardiology, currently on Lasix IV 40 mg BID --> qdaily IV --> PO 40 mg daily on 6/16/20  TTE  LVEF= 50-55%., tele monitoring: no events  weaned off bipap, now on nasal canula to room air  stricts I/O, daily weights  no sick contact, doubt COVID19, nasal swab neg  CXR no evidence of PNA. monitor off abx.   started Solumedrol IV by pulm for COPD exacerbation. tapered to PO given hyperglycemia. now completely off.   c/w home meds: inhalers. duoneb PRN.   mild leukocytosis resolved.   neg blood cx, U/A, CXR, COVID PCR    # hx of CAD s/p CABG  cardio eval, resume home meds: Asa/Plavix/statin/betablocker  TTE as above  trend trops, likely demand ischemia  recent left Heart cath with nonobstructive disease   repeat C 6/15/20 given intermittent chest pain, nonobstructive disease, grafts open.  Ranexa added 6/16/20 for chest pain symptom management    # Acute kidney failure  likely in the setting of cardio renal etiology.   r/o obstructive etiology. check post void bladder scan.   trend Cr, holding Losartan per renal  monitor urine outpt  avoid nephrotoxic agents    #Diabetes:   hgbA1C 7.8%, start ISS  he takes Levemir 20 units QHS and metformin  sugars high due to IV steroids now tapered  c/w premeal insulin to humalog 7 units TID. c/w Lantus 20 units QHS  now off steroids, monitor for hypoglycemia.   on discharge, will resume metformin along with levemir 20 units qhs  Endo consult appreciated.    DVT ppx

## 2020-06-18 NOTE — DISCHARGE NOTE PROVIDER - NSDCMRMEDTOKEN_GEN_ALL_CORE_FT
albuterol 90 mcg/inh inhalation powder: 2 puff(s) inhaled every 6 hours  aspirin 81 mg oral delayed release tablet: 1 tab(s) orally once a day  Flomax 0.4 mg oral capsule: 1 cap(s) orally once a day  Imdur 120 mg oral tablet, extended release: 1 tab(s) orally once a day (in the morning)  Levemir 100 units/mL subcutaneous solution: 20 unit(s) subcutaneous once a day (at bedtime)  Lipitor 80 mg oral tablet: 1 tab(s) orally once a day  Liquifilm Tears preserved ophthalmic solution: 1 drop(s) to each affected eye 2 times a day  losartan 100 mg oral tablet: 1 tab(s) orally once a day MDD:1  metFORMIN 1000 mg oral tablet: 1 tab(s) orally 2 times a day  Plavix 75 mg oral tablet: 1 tab(s) orally once a day   Symbicort 160 mcg-4.5 mcg/inh inhalation aerosol: 2 puff(s) inhaled 2 times a day albuterol 90 mcg/inh inhalation aerosol: 2 puff(s) inhaled every 6 hours, As Needed -for shortness of breath and/or wheezing   aspirin 81 mg oral delayed release tablet: 1 tab(s) orally once a day  Flomax 0.4 mg oral capsule: 1 cap(s) orally once a day  furosemide 40 mg oral tablet: 1 tab(s) orally once a day  Insulin Pen Needles, 4mm: 1 application subcutaneously 4 times a day . ** Use with insulin pen **   isosorbide mononitrate 120 mg oral tablet, extended release: 1 tab(s) orally once a day   Levemir 100 units/mL subcutaneous solution: 20 unit(s) subcutaneous once a day (at bedtime)  Lipitor 80 mg oral tablet: 1 tab(s) orally once a day  Liquifilm Tears preserved ophthalmic solution: 1 drop(s) to each affected eye 2 times a day  metFORMIN 1000 mg oral tablet: 1 tab(s) orally 2 times a day  Plavix 75 mg oral tablet: 1 tab(s) orally once a day   ranolazine 500 mg oral tablet, extended release: 1 tab(s) orally 2 times a day  senna oral tablet: 2 tab(s) orally once a day (at bedtime)  Symbicort 160 mcg-4.5 mcg/inh inhalation aerosol: 2 puff(s) inhaled 2 times a day

## 2020-06-18 NOTE — DISCHARGE NOTE PROVIDER - CARE PROVIDER_API CALL
[place]  CRITICAL CARE MEDICINE  [address] [place] [address]  Phone: [phone]  Fax: [phone]  Follow Up Time:     Mario Kothari  CARDIOLOGY  [address] [facility] [address]  [place] [address]  Phone: [phone]  Fax: [phone]  Follow Up Time:

## 2020-06-18 NOTE — PROGRESS NOTE ADULT - ASSESSMENT
70 y/o male, with a PmHx of HTN, COPD, CHF, HLD, DM, CABG, presented to the [facility] ED with SOB. Admitted to telemetry for acute on chronic chf exacerbation.    COPD exacerbation:  CHF exacerbation:   Obesity AUDI:  HTN, HLD  DM: CABG     he is wheezing : Starts steroids  cont symbicort:  Duoneb q 6 hours: Do d dimer: if high : cta chest otherwise ct chest without contrast  start bipap at night time: 10/5: 30 % fio2::   though he uses capa at home but he was hypercarbic on admission:  Controlled  Cont diuretics:  echo: : for cath eventually:   Hypercarbic resp failure: improved on bipap   DW Sesar      6/14:    feels better:    hyperglycemic:  decrease steroids:   likes the bipap n the night  for ischemia workup:  cont diuresis:  monitor renal fnction:s  cont tomonitor blood glcuse;\  DW NP    6/15:    breathing wise he feels better  his d dimer was < 150 :  he had ct chest done last night: has 6 MM RML nodule which can be followed up as an outpatient  He has left lower lobe bronchiectasis ? old infection cont Symbicort  he likely has debris in trachea  As he would need repeat ct scan chest any way in 6-8 weeks time: this can be followed up at that time   Cont steroids for a few days more: control blood glucse: endo is following  :  isch evaluation by cards:   JOSE J THOMAS     6/16:    Clinically he looks better:   no wheezing or SOB : he hasno chest pain today :  he has left lower lobe bronciectasis : stable no wheezing:  his RML nodule should be follwed up as an outpatient  He can follow with me in 4 weeks time after dc for follow up of nodules:  CASSIDY gasca discussed this with thept in detail in his own language:  DC steroids:   DVT propahyxlis  JOSE J PA      6/17:  He has diffuise cad: not amenable to PCI :  his breathing did get better:  Cont curretn rx:  his d dimer wasnormal:  he has bronchiectasis and RML nodule: follow up as an outpateint:   needs P SG too: DVT propahyxlis:  Cont cardiac meds:   JOSE J THOMAS    6/17:    he has no problems with h is breathing now:  His room air oxygenation is pretty good:  To cont Symbicort  His room air oxygenation is pretty good:   Cont cardiac treatment:  He has bronchiectasis : but no intervention except BD:  he would need full PFT as an outpatient   dvt prophylaxis

## 2020-06-18 NOTE — DISCHARGE NOTE PROVIDER - HOSPITAL COURSE
69 y/o male with PMHx of HTN, T2DM, CAD s/p CABG 09/2014 & GUILLE to ostial (09/2015), % with patent grafts (with patent LIMA to LAD and patent SVG to diag), recent left Heart cath with nonobstructive disease in January 2019, presenting with worsening SOB for several days, +cough. He reports orthopnea  and worsening LE swelling.          Hospital Course:    Dyspnea likely acute on chronic heart failure in the setting of volume overload with bibasilar crackles. Dinnamdie per cardiology, currently on Lasix IV 40 mg BID --> qdaily IV --> PO 40 mg daily on 6/16/20. TTE  LVEF= 50-55%., tele monitoring: no events. Weaned off bipap, now on nasal canula to room air    Stricts I/O, daily weights. No sick contact, doubt COVID19, nasal swab negative. CXR no evidence of PNA. monitor off abx.     started Solumedrol IV by pulm for COPD exacerbation. tapered to PO given hyperglycemia. now completely off.     c/w home meds: inhalers. duoneb PRN. Mild leukocytosis resolved. Negative blood cx, U/A, CXR, COVID PCR        CAD s/p CABG: Cardio eval, resume home meds: Asa/Plavix/statin/betablocker. TTE as above. Trend trops, likely demand ischemia. Recent left heart cath with nonobstructive disease     repeat C 6/15/20 given intermittent chest pain, nonobstructive disease, grafts open. Ranexa added 6/16/20 for chest pain symptom management        Acute kidney failure: likely in the setting of cardio renal etiology. r/o obstructive etiology. Check post void bladder scan. Trend Cr, holding Losartan per renal. Monitor urine outpt. Avoid nephrotoxic agents        Diabetes: hgbA1C 7.8%, start ISS. He takes Levemir 20 units QHS and metformin. Sugars high due to IV steroids now tapered. c/w premeal insulin to humalog 7 units TID. c/w Lantus 20 units QHS. Now off steroids, monitor for hypoglycemia. Endo consult appreciated.        ***INCOMPLETE 67 y/o male with PMHx of HTN, T2DM, CAD s/p CABG 09/2014 & GUILLE to ostial (09/2015), % with patent grafts (with patent LIMA to LAD and patent SVG to diag), recent left Heart cath with nonobstructive disease in January 2019, presenting with worsening SOB for several days, +cough. He reports orthopnea  and worsening LE swelling.          Hospital Course:    Dyspnea likely acute on chronic heart failure in the setting of volume overload with bibasilar crackles. Diurese per cardiology, currently on Lasix IV 40 mg BID --> qdaily IV --> PO 40 mg daily on 6/16/20. TTE  LVEF= 50-55%., tele monitoring: no events. Weaned off bipap, now on nasal canula to room air. Stricts I/O, daily weights. No sick contact, doubt COVID19, nasal swab negative. CXR no evidence of PNA. monitor off abx.     started Solumedrol IV by pulm for COPD exacerbation. tapered to PO given hyperglycemia. now completely off.  c/w home meds: inhalers. duoneb PRN. Mild leukocytosis resolved. Negative blood cx, U/A, CXR, COVID PCR        CAD s/p CABG: Cardio eval, resume home meds: Asa/Plavix/statin/betablocker. TTE as above. Trend trops, likely demand ischemia. Recent left heart cath with nonobstructive disease     repeat [facility] 6/15/20 given intermittent chest pain, nonobstructive disease, grafts open. Ranexa added 6/16/20 for chest pain symptom management        Acute kidney failure: likely in the setting of cardio renal etiology. r/o obstructive etiology. Check post void bladder scan. Trend Cr, holding Losartan per renal. Monitor urine outpt. Avoid nephrotoxic agents        Diabetes: hgbA1C 7.8%, start ISS. He takes Levemir 20 units QHS and metformin. Sugars high due to IV steroids now tapered. c/w premeal insulin to humalog 7 units TID. c/w Lantus 20 units QHS. Now off steroids, monitor for hypoglycemia. Endo consult appreciated.        Case discussed with  *** 69 y/o male with PMHx of HTN, T2DM, CAD s/p CABG 09/2014 & GUILLE to ostial (09/2015), % with patent grafts (with patent LIMA to LAD and patent SVG to diag), recent left Heart cath with nonobstructive disease in January 2019, presenting with worsening SOB for several days, +cough. He reports orthopnea  and worsening LE swelling.          Hospital Course:    Dyspnea likely acute on chronic heart failure in the setting of volume overload with bibasilar crackles. Mikeye per cardiology, currently on Lasix IV 40 mg BID --> qdaily IV --> PO 40 mg daily on 6/16/20. TTE  LVEF= 50-55%., tele monitoring: no events. Weaned off bipap, now on nasal canula to room air. Stricts I/O, daily weights. No sick contact, doubt COVID19, nasal swab negative. CXR no evidence of PNA. monitor off abx.     started Solumedrol IV by pulm for COPD exacerbation. tapered to PO given hyperglycemia. now completely off.  c/w home meds: inhalers. duoneb PRN. Mild leukocytosis resolved. Negative blood cx, U/A, CXR, COVID PCR        CAD s/p CABG: Cardio eval, resume home meds: Asa/Plavix/statin/betablocker. TTE as above. Trend trops, likely demand ischemia. Recent left heart cath with nonobstructive disease     repeat C 6/15/20 given intermittent chest pain, nonobstructive disease, grafts open. Ranexa added 6/16/20 for chest pain symptom management        Acute kidney failure: likely in the setting of cardio renal etiology. r/o obstructive etiology. Check post void bladder scan. Trend Cr, holding Losartan per renal. Monitor urine outpt. Avoid nephrotoxic agents        Diabetes: hgbA1C 7.8%, start ISS. He takes Levemir 20 units QHS and metformin. Sugars high due to IV steroids now tapered. c/w premeal insulin to humalog 7 units TID. c/w Lantus 20 units QHS. Now off steroids, monitor for hypoglycemia. Endo consult appreciated.- d/w NP Christina, recommend to d/c home on home regimen.         Case discussed with Dr. Kothari, labs/vitals reviewed, Pt medically cleared for discharge to home with outpt follow up, plan and medications reviewed with Pt and son at bedside, all ? answered. 69 y/o male with PMHx of HTN, T2DM, CAD s/p CABG 09/2014 & GUILLE to ostial (09/2015), % with patent grafts (with patent LIMA to LAD and patent SVG to diag), recent left Heart cath with nonobstructive disease in January 2019, presenting with worsening SOB for several days, +cough. He reports orthopnea  and worsening LE swelling.          Hospital Course:    Dyspnea likely acute on chronic heart failure in the setting of volume overload with bibasilar crackles. Mikeye per cardiology, currently on Lasix IV 40 mg BID --> qdaily IV --> PO 40 mg daily on 6/16/20. TTE  LVEF= 50-55%., tele monitoring: no events. Weaned off bipap, now on nasal canula to room air. Stricts I/O, daily weights. No sick contact, doubt COVID19, nasal swab negative. CXR no evidence of PNA. monitor off abx.     Pulm following: started Solumedrol IV for COPD exacerbation. tapered to PO given hyperglycemia. now completely off.  c/w home meds: inhalers. duoneb PRN. Mild leukocytosis resolved. Negative blood cx, U/A, CXR, COVID PCR        CAD s/p CABG: Cardio eval, resume home meds: Asa/Plavix/statin/betablocker. TTE as above. Trend trops, likely demand ischemia. Recent left heart cath with nonobstructive disease     repeat C 6/15/20 given intermittent chest pain, nonobstructive disease, grafts open. Ranexa added 6/16/20 for chest pain symptom management        Acute kidney failure: likely in the setting of cardio renal etiology. r/o obstructive etiology. Check post void bladder scan. Trend Cr, holding Losartan per renal. Monitor urine outpt. Avoid nephrotoxic agents        Diabetes: hgbA1C 7.8%, start ISS. He takes Levemir 20 units QHS and metformin. Sugars high due to IV steroids now tapered. c/w premeal insulin to humalog 7 units TID. c/w Lantus 20 units QHS. Now off steroids, monitor for hypoglycemia. Endo consult appreciated.- d/w NP Christina, recommend to d/c home on home regimen.         Case discussed with Dr. Kothari, labs/vitals reviewed, Pt medically cleared for discharge to home with outpt follow up, plan and medications reviewed with Pt and son at bedside, all ? answered. 69 y/o male with PMHx of HTN, T2DM, CAD s/p CABG 09/2014 & GUILLE to ostial (09/2015), % with patent grafts (with patent LIMA to LAD and patent SVG to diag), recent left Heart cath with nonobstructive disease in January 2019, presenting with worsening SOB for several days, +cough. He reports orthopnea  and worsening LE swelling.          Hospital Course:    Dyspnea likely acute on diastolic chronic heart failure in the setting of volume overload with bibasilar crackles. Diurese per cardiology, currently on Lasix IV 40 mg BID --> qdaily IV --> PO 40 mg daily on 6/16/20. TTE  LVEF= 50-55%., tele monitoring: no events. Weaned off bipap, now on nasal canula to room air. Stricts I/O, daily weights. No sick contact, doubt COVID19, nasal swab negative. CXR no evidence of PNA. monitor off abx.     Pulm following: started Solumedrol IV for COPD exacerbation. tapered to PO given hyperglycemia. now completely off.  c/w home meds: inhalers. duoneb PRN. Mild leukocytosis resolved. Negative blood cx, U/A, CXR, COVID PCR        CAD s/p CABG: Cardio eval, resume home meds: Asa/Plavix/statin/betablocker. TTE as above. Trend trops, likely demand ischemia. Recent left heart cath with nonobstructive disease     repeat C 6/15/20 given intermittent chest pain, nonobstructive disease, grafts open. Ranexa added 6/16/20 for chest pain symptom management        Acute kidney failure: likely in the setting of cardio renal etiology. r/o obstructive etiology. Check post void bladder scan. Trend Cr, holding Losartan per renal. Monitor urine outpt. Avoid nephrotoxic agents        Diabetes: hgbA1C 7.8%, start ISS. He takes Levemir 20 units QHS and metformin. Sugars high due to IV steroids now tapered. c/w premeal insulin to humalog 7 units TID. c/w Lantus 20 units QHS. Now off steroids, monitor for hypoglycemia. Endo consult appreciated.- d/w NP Christina, recommend to d/c home on home regimen.         Case discussed with Dr. Kothari, labs/vitals reviewed, Pt medically cleared for discharge to home with outpt follow up, plan and medications reviewed with Pt and son at bedside, all ? answered.

## 2020-06-18 NOTE — DISCHARGE NOTE PROVIDER - INSTRUCTIONS
Low cholesterol diet. Salt restriction. 1800Kcal diabetic diet with carb restriction. Fluid restrict to 1.5L a day. Avoid complex carbohydrates such as bread, pasta, cereal, white rice, white potatoes, etc. Avoid concentrated sugar as found in desserts, candy, soda, juice, etc. Consume a diet based on lean protein (chicken, fish) and vegetables.

## 2020-06-18 NOTE — PROGRESS NOTE ADULT - SUBJECTIVE AND OBJECTIVE BOX
Patient is a 69y old  Male who presents with a chief complaint of Shortness of Breath (18 Jun 2020 09:42)      Any change in ROS: He feels better:  no SOB     MEDICATIONS  (STANDING):  artificial  tears Solution 1 Drop(s) Both EYES two times a day  aspirin enteric coated 81 milliGRAM(s) Oral daily  atorvastatin 80 milliGRAM(s) Oral at bedtime  budesonide 160 MICROgram(s)/formoterol 4.5 MICROgram(s) Inhaler 2 Puff(s) Inhalation two times a day  clopidogrel Tablet 75 milliGRAM(s) Oral daily  dextrose 5%. 1000 milliLiter(s) (50 mL/Hr) IV Continuous <Continuous>  dextrose 50% Injectable 12.5 Gram(s) IV Push once  dextrose 50% Injectable 25 Gram(s) IV Push once  dextrose 50% Injectable 25 Gram(s) IV Push once  enoxaparin Injectable 40 milliGRAM(s) SubCutaneous daily  furosemide    Tablet 40 milliGRAM(s) Oral daily  insulin detemir injectable (LEVEMIR) 20 Unit(s) SubCutaneous at bedtime  insulin lispro (HumaLOG) corrective regimen sliding scale   SubCutaneous three times a day before meals  insulin lispro (HumaLOG) corrective regimen sliding scale   SubCutaneous at bedtime  insulin lispro Injectable (HumaLOG) 7 Unit(s) SubCutaneous three times a day before meals  isosorbide   mononitrate ER Tablet (IMDUR) 120 milliGRAM(s) Oral daily  ranolazine 500 milliGRAM(s) Oral two times a day  senna 2 Tablet(s) Oral at bedtime  tamsulosin 0.4 milliGRAM(s) Oral at bedtime    MEDICATIONS  (PRN):  acetaminophen   Tablet .. 650 milliGRAM(s) Oral every 6 hours PRN Temp greater or equal to 38C (100.4F), Mild Pain (1 - 3), Moderate Pain (4 - 6)  albuterol/ipratropium for Nebulization 3 milliLiter(s) Nebulizer every 6 hours PRN SOB  dextrose 40% Gel 15 Gram(s) Oral once PRN Blood Glucose LESS THAN 70 milliGRAM(s)/deciliter  glucagon  Injectable 1 milliGRAM(s) IntraMuscular once PRN Glucose LESS THAN 70 milligrams/deciliter  nitroglycerin     SubLingual 0.4 milliGRAM(s) SubLingual every 5 minutes PRN Chest Pain  polyethylene glycol 3350 17 Gram(s) Oral daily PRN Constipation    Vital Signs Last 24 Hrs  T(C): 36.8 (18 Jun 2020 12:15), Max: 36.8 (18 Jun 2020 12:15)  T(F): 98.2 (18 Jun 2020 12:15), Max: 98.2 (18 Jun 2020 12:15)  HR: 69 (18 Jun 2020 12:15) (62 - 80)  BP: 129/83 (18 Jun 2020 12:15) (118/81 - 136/88)  BP(mean): --  RR: 16 (18 Jun 2020 12:15) (16 - 100)  SpO2: 98% (18 Jun 2020 12:15) (95% - 100%)    I&O's Summary    17 Jun 2020 07:01  -  18 Jun 2020 07:00  --------------------------------------------------------  IN: 1000 mL / OUT: 1800 mL / NET: -800 mL    18 Jun 2020 07:01  -  18 Jun 2020 12:47  --------------------------------------------------------  IN: 200 mL / OUT: 750 mL / NET: -550 mL          Physical Exam:   GENERAL: NAD, well-groomed, well-developed  HEENT: KAZ/   Atraumatic, Normocephalic  ENMT: No tonsillar erythema, exudates, or enlargement; Moist mucous membranes, Good dentition, No lesions  NECK: Supple, No JVD, Normal thyroid  CHEST/LUNG: Clear to auscultaion, ; No rales, rhonchi, wheezing, or rubs  CVS: Regular rate and rhythm; No murmurs, rubs, or gallops  GI: : Soft, Nontender, Nondistended; Bowel sounds present  NERVOUS SYSTEM:  Alert & Oriented X3  EXTREMITIES:  2+ Peripheral Pulses, No clubbing, cyanosis, or edema  LYMPH: No lymphadenopathy noted  SKIN: No rashes or lesions  ENDOCRINOLOGY: No Thyromegaly  PSYCH: Appropriate    Labs:  23, 21, 20                            13.4   9.46  )-----------( 245      ( 18 Jun 2020 07:30 )             41.5                         13.4   7.77  )-----------( 255      ( 17 Jun 2020 06:30 )             41.7                         12.7   7.51  )-----------( 259      ( 16 Jun 2020 05:40 )             39.1                         12.5   10.79 )-----------( 245      ( 15 Roberto 2020 06:22 )             37.9     06-18    140  |  103  |  33<H>  ----------------------------<  122<H>  3.9   |  24  |  1.04  06-17    141  |  101  |  36<H>  ----------------------------<  177<H>  3.3<L>   |  28  |  1.09  06-16    143  |  104  |  42<H>  ----------------------------<  221<H>  3.7   |  25  |  1.00  06-15    142  |  104  |  49<H>  ----------------------------<  228<H>  3.6   |  26  |  1.09    Ca    9.1      18 Jun 2020 07:30  Ca    9.2      17 Jun 2020 06:30  Phos  3.8     06-18  Phos  3.6     06-17  Mg     2.1     06-18  Mg     2.0     06-17      CAPILLARY BLOOD GLUCOSE      POCT Blood Glucose.: 156 mg/dL (18 Jun 2020 12:15)  POCT Blood Glucose.: 117 mg/dL (18 Jun 2020 08:25)  POCT Blood Glucose.: 220 mg/dL (17 Jun 2020 22:28)  POCT Blood Glucose.: 145 mg/dL (17 Jun 2020 17:15)        < from: CT Chest No Cont (06.14.20 @ 14:09) >      PROCEDURE DATE:  Jun 14 2020         INTERPRETATION:  CLINICAL INFORMATION: Shortness of breath. Heart failure and COPD.    COMPARISON: CT abdomen and pelvis 9/23/2014..    PROCEDURE:   CT of the Chest was performed without intravenous contrast.  Sagittal and coronal reformats were performed.    FINDINGS:    LUNGS AND AIRWAYS: There is cystic bronchiectasis in the left lower lobe. There are no lung nodules or consolidations. There is linear and nodular opacity in the trachea extending into the left mainstem bronchus which likely represents mucus/debris. Short-term follow-up is recommended to exclude a polyp.     PLEURA: No pleural effusion. No pneumothorax.  MEDIASTINUM AND JAE: No lymphadenopathy.  VESSELS: Atherosclerotic changes of the aorta and coronary arteries.  HEART: Heart size is enlarged. Mitral annular calcifications. No pericardial effusion. Status post CABG.  CHEST WALL AND LOWER NECK: Status post median sternotomy.  VISUALIZED UPPER ABDOMEN: Within normal limits.  BONES: Degenerative changes. Sternotomy.    IMPRESSION:   A 6 mm right middle lobe nodule. Consider follow-up chest CT in 3-6 months.    There is cystic bronchiectasis in the left lower lobe. There are no lung nodules or consolidations. There is linear and nodular opacity in the trachea extending into the left mainstem bronchus which likely represents mucus/debris. Follow-up is recommended to exclude a polyp.              ADIEL RUIZ M.D., RADIOLOGY RESIDENT  This document has been electronically signed.  MYKE SOLER M.D., ATTENDING RADIOLOGIST  This document has been electronically signed. Jun 14 2020  2:27PM        < end of copied text >      D-Dimer Assay, Quantitative: < 150 ng/mL (06-13 @ 19:30)        RECENT CULTURES:        RESPIRATORY CULTURES:          Studies  Chest X-RAY  CT SCAN Chest   Venous Dopplers: LE:   CT Abdomen  Others

## 2020-06-18 NOTE — DISCHARGE NOTE NURSING/CASE MANAGEMENT/SOCIAL WORK - PATIENT PORTAL LINK FT
You can access the FollowMyHealth Patient Portal offered by [facility] by registering at the following website: [email or web address]. By joining [facility]’s FollowMyHealth portal, you will also be able to view your health information using other applications (apps) compatible with our system.

## 2020-07-08 ENCOUNTER — EMERGENCY (EMERGENCY)
Facility: HOSPITAL | Age: 70
LOS: 1 days | Discharge: HOME | End: 2020-07-08
Attending: STUDENT IN AN ORGANIZED HEALTH CARE EDUCATION/TRAINING PROGRAM | Admitting: STUDENT IN AN ORGANIZED HEALTH CARE EDUCATION/TRAINING PROGRAM
Payer: MEDICAID

## 2020-07-08 VITALS
HEART RATE: 102 BPM | TEMPERATURE: 98 F | RESPIRATION RATE: 18 BRPM | DIASTOLIC BLOOD PRESSURE: 92 MMHG | SYSTOLIC BLOOD PRESSURE: 144 MMHG | OXYGEN SATURATION: 100 %

## 2020-07-08 DIAGNOSIS — Z95.1 PRESENCE OF AORTOCORONARY BYPASS GRAFT: Chronic | ICD-10-CM

## 2020-07-08 LAB
ALBUMIN SERPL ELPH-MCNC: 4.3 G/DL — SIGNIFICANT CHANGE UP (ref 3.3–5)
ALP SERPL-CCNC: 49 U/L — SIGNIFICANT CHANGE UP (ref 40–120)
ALT FLD-CCNC: 12 U/L — SIGNIFICANT CHANGE UP (ref 4–41)
ANION GAP SERPL CALC-SCNC: 16 MMO/L — HIGH (ref 7–14)
APTT BLD: 27.7 SEC — SIGNIFICANT CHANGE UP (ref 27–36.3)
AST SERPL-CCNC: 17 U/L — SIGNIFICANT CHANGE UP (ref 4–40)
BASE EXCESS BLDV CALC-SCNC: 0.7 MMOL/L — SIGNIFICANT CHANGE UP
BASOPHILS # BLD AUTO: 0.02 K/UL — SIGNIFICANT CHANGE UP (ref 0–0.2)
BASOPHILS NFR BLD AUTO: 0.4 % — SIGNIFICANT CHANGE UP (ref 0–2)
BILIRUB SERPL-MCNC: 0.6 MG/DL — SIGNIFICANT CHANGE UP (ref 0.2–1.2)
BLD GP AB SCN SERPL QL: NEGATIVE — SIGNIFICANT CHANGE UP
BLOOD GAS VENOUS - CREATININE: 1.65 MG/DL — HIGH (ref 0.5–1.3)
BUN SERPL-MCNC: 22 MG/DL — SIGNIFICANT CHANGE UP (ref 7–23)
CALCIUM SERPL-MCNC: 9.6 MG/DL — SIGNIFICANT CHANGE UP (ref 8.4–10.5)
CHLORIDE BLDV-SCNC: 93 MMOL/L — LOW (ref 96–108)
CHLORIDE SERPL-SCNC: 90 MMOL/L — LOW (ref 98–107)
CO2 SERPL-SCNC: 21 MMOL/L — LOW (ref 22–31)
CREAT SERPL-MCNC: 1.53 MG/DL — HIGH (ref 0.5–1.3)
EOSINOPHIL # BLD AUTO: 0.1 K/UL — SIGNIFICANT CHANGE UP (ref 0–0.5)
EOSINOPHIL NFR BLD AUTO: 2.1 % — SIGNIFICANT CHANGE UP (ref 0–6)
GAS PNL BLDV: 127 MMOL/L — LOW (ref 136–146)
GLUCOSE BLDV-MCNC: 138 MG/DL — HIGH (ref 70–99)
GLUCOSE SERPL-MCNC: 145 MG/DL — HIGH (ref 70–99)
HCO3 BLDV-SCNC: 25 MMOL/L — SIGNIFICANT CHANGE UP (ref 20–27)
HCT VFR BLD CALC: 37.9 % — LOW (ref 39–50)
HCT VFR BLDV CALC: 38.2 % — LOW (ref 39–51)
HGB BLD-MCNC: 12.6 G/DL — LOW (ref 13–17)
HGB BLDV-MCNC: 12.4 G/DL — LOW (ref 13–17)
IMM GRANULOCYTES NFR BLD AUTO: 0.2 % — SIGNIFICANT CHANGE UP (ref 0–1.5)
INR BLD: 0.99 — SIGNIFICANT CHANGE UP (ref 0.88–1.17)
LACTATE BLDV-MCNC: 1.7 MMOL/L — SIGNIFICANT CHANGE UP (ref 0.5–2)
LIDOCAIN IGE QN: 20.4 U/L — SIGNIFICANT CHANGE UP (ref 7–60)
LYMPHOCYTES # BLD AUTO: 1.49 K/UL — SIGNIFICANT CHANGE UP (ref 1–3.3)
LYMPHOCYTES # BLD AUTO: 31.4 % — SIGNIFICANT CHANGE UP (ref 13–44)
MCHC RBC-ENTMCNC: 28.6 PG — SIGNIFICANT CHANGE UP (ref 27–34)
MCHC RBC-ENTMCNC: 33.2 % — SIGNIFICANT CHANGE UP (ref 32–36)
MCV RBC AUTO: 85.9 FL — SIGNIFICANT CHANGE UP (ref 80–100)
MONOCYTES # BLD AUTO: 0.47 K/UL — SIGNIFICANT CHANGE UP (ref 0–0.9)
MONOCYTES NFR BLD AUTO: 9.9 % — SIGNIFICANT CHANGE UP (ref 2–14)
NEUTROPHILS # BLD AUTO: 2.65 K/UL — SIGNIFICANT CHANGE UP (ref 1.8–7.4)
NEUTROPHILS NFR BLD AUTO: 56 % — SIGNIFICANT CHANGE UP (ref 43–77)
NRBC # FLD: 0 K/UL — SIGNIFICANT CHANGE UP (ref 0–0)
PCO2 BLDV: 41 MMHG — SIGNIFICANT CHANGE UP (ref 41–51)
PH BLDV: 7.41 PH — SIGNIFICANT CHANGE UP (ref 7.32–7.43)
PLATELET # BLD AUTO: 274 K/UL — SIGNIFICANT CHANGE UP (ref 150–400)
PMV BLD: 11.5 FL — SIGNIFICANT CHANGE UP (ref 7–13)
PO2 BLDV: 87 MMHG — HIGH (ref 35–40)
POTASSIUM BLDV-SCNC: 4.3 MMOL/L — SIGNIFICANT CHANGE UP (ref 3.4–4.5)
POTASSIUM SERPL-MCNC: 4.5 MMOL/L — SIGNIFICANT CHANGE UP (ref 3.5–5.3)
POTASSIUM SERPL-SCNC: 4.5 MMOL/L — SIGNIFICANT CHANGE UP (ref 3.5–5.3)
PROT SERPL-MCNC: 6.9 G/DL — SIGNIFICANT CHANGE UP (ref 6–8.3)
PROTHROM AB SERPL-ACNC: 11.3 SEC — SIGNIFICANT CHANGE UP (ref 9.8–13.1)
RBC # BLD: 4.41 M/UL — SIGNIFICANT CHANGE UP (ref 4.2–5.8)
RBC # FLD: 14.7 % — HIGH (ref 10.3–14.5)
RH IG SCN BLD-IMP: POSITIVE — SIGNIFICANT CHANGE UP
SAO2 % BLDV: 96.1 % — HIGH (ref 60–85)
SODIUM SERPL-SCNC: 127 MMOL/L — LOW (ref 135–145)
TROPONIN T, HIGH SENSITIVITY: 27 NG/L — SIGNIFICANT CHANGE UP (ref ?–14)
WBC # BLD: 4.74 K/UL — SIGNIFICANT CHANGE UP (ref 3.8–10.5)
WBC # FLD AUTO: 4.74 K/UL — SIGNIFICANT CHANGE UP (ref 3.8–10.5)

## 2020-07-08 PROCEDURE — 99285 EMERGENCY DEPT VISIT HI MDM: CPT

## 2020-07-08 RX ORDER — ONDANSETRON 8 MG/1
4 TABLET, FILM COATED ORAL ONCE
Refills: 0 | Status: COMPLETED | OUTPATIENT
Start: 2020-07-08 | End: 2020-07-08

## 2020-07-08 RX ORDER — SODIUM CHLORIDE 9 MG/ML
1000 INJECTION INTRAMUSCULAR; INTRAVENOUS; SUBCUTANEOUS ONCE
Refills: 0 | Status: COMPLETED | OUTPATIENT
Start: 2020-07-08 | End: 2020-07-08

## 2020-07-08 RX ORDER — MORPHINE SULFATE 50 MG/1
4 CAPSULE, EXTENDED RELEASE ORAL ONCE
Refills: 0 | Status: DISCONTINUED | OUTPATIENT
Start: 2020-07-08 | End: 2020-07-08

## 2020-07-08 RX ADMIN — MORPHINE SULFATE 4 MILLIGRAM(S): 50 CAPSULE, EXTENDED RELEASE ORAL at 23:11

## 2020-07-08 RX ADMIN — ONDANSETRON 4 MILLIGRAM(S): 8 TABLET, FILM COATED ORAL at 23:11

## 2020-07-08 RX ADMIN — SODIUM CHLORIDE 1000 MILLILITER(S): 9 INJECTION INTRAMUSCULAR; INTRAVENOUS; SUBCUTANEOUS at 23:11

## 2020-07-08 NOTE — ED PROVIDER NOTE - CLINICAL SUMMARY MEDICAL DECISION MAKING FREE TEXT BOX
69 M hx of HTN, COPD, CHF, HLD, DM, CABG  s/p cath 3 weeks prior p/w 5 days of periumbilical pain w/ vomiting. Pt complaining of consitpation w/ abdominal pain and dysuria. Pain is 6/10 worse with palpation and movement. vomiting every time he eats. denies fever chills , denies abdominal surgeries. r/o pancreatitis vs bowel obstruction. cbc, cmp, trop, ekg, ct abdomen, lipase, ua, iVf, GI cocktail

## 2020-07-08 NOTE — ED PROVIDER NOTE - ATTENDING CONTRIBUTION TO CARE
69 M hx of HTN, COPD, CHF, HLD, DM, CABG  s/p cath 3 weeks prior p/w 5 days of periumbilical pain w/ vomiting. Pt complaining of constipation w/ abdominal pain and dysuria. Pain is 6/10 worse with palpation and movement. vomiting every time he eats. denies fever chills. States pain is sharp cramping located periumbically and RLQ. denies syncope, diarrhea, last bm was 2 days prior normal. Vomiting is NBNB, states he has been having increased urination.  ROS as above.  Gen: Awake, Alert, WD, WN, NAD  Head:  NC/AT  Eyes:  PERRL, EOMI, Conjunctiva pink, lids normal, no scleral icterus  ENT: OP clear, no exudates, no erythema, uvula midline,  moist mucus membranes  Neck: supple, nontender, no meningismus, no JVD, trachea midline  Cardiac/CV:  S1 S2, RRR, no M/G/R  Respiratory/Pulm:  CTAB, good air movement, normal resp effort, no wheezes/stridor/retractions/rales/rhonchi  Gastrointestinal/Abdomen:  Soft, tender periumbilical and RLQ , nondistended, +BS, no rebound/guarding  Back:  no CVAT, no MLT  Ext:  warm, well perfused, moving all extremities spontaneously, no peripheral edema, distal pulses intact  Neuro:  AAOx3, sensation intact, motor 5/5 x 4 extremities, normal gait, speech clear

## 2020-07-08 NOTE — ED PROVIDER NOTE - PATIENT PORTAL LINK FT
You can access the FollowMyHealth Patient Portal offered by [facility] by registering at the following website: [email or web address]. By joining [employer]’s FollowMyHealth portal, you will also be able to view your health information using other applications (apps) compatible with our system.

## 2020-07-08 NOTE — ED PROVIDER NOTE - NSFOLLOWUPCLINICS_GEN_ALL_ED_FT
[place] [facility]  Urology  [address] [place] [address]  Phone: [phone]  Fax:   Follow Up Time:

## 2020-07-08 NOTE — ED PROVIDER NOTE - PROGRESS NOTE DETAILS
Dr. Rachel: I have personally seen and examined this patient at the bedside. I have fully participated in the care of this patient. I have reviewed all pertinent clinical information, including history, physical exam, plan and the Resident's note and agree except as noted. HPI above as by me. PE above as by me. DDX PLAN Pt discussed results of CT, labs. Pt notes decreased PO intake, however drank apple juice in the ER. Tolerating PO. Pt also complaining of weak stream of urine, PVR 100cc. taking flomax. +constipation. Will DC with constipation medications, f/u with urology for eval for prostate enlargement.

## 2020-07-08 NOTE — ED PROVIDER NOTE - PMH
BPH (benign prostatic hyperplasia)    CHF (congestive heart failure)    Chronic obstructive pulmonary disease, unspecified COPD type    Diabetes mellitus    HLD (hyperlipidemia)    HTN (hypertension)    Lacunar infarction

## 2020-07-08 NOTE — ED PROVIDER NOTE - OBJECTIVE STATEMENT
61yo M with hx of HTN/HLD, CAD s/p CABG, CHF s/p recent admission s/p cath presents today with abdominal pain for 5 days, periumbilical. +nausea, vomiting. NBNB vomiting every time he eats / drinks. has not passed gas in 3-4 days, no BM.

## 2020-07-08 NOTE — ED PROVIDER NOTE - PHYSICAL EXAMINATION
Gen: Well appearing, NAD  Head: NCAT  HEENT: PERRL, MMM, normal conjunctiva, anicteric, neck supple  Lung: CTAB, no adventitious sounds  CV: RRR, no murmurs, rubs or gallops  Abd: periumbilical TTP mild, no rebound or guarding, no CVAT  MSK: No edema, no visible deformities  Neuro: No focal neurologic deficits. CN II-XII grossly intact. 5/5 strength and normal sensation in all extremities.  Skin: Warm and dry, no evidence of rash  Psych: normal mood and affect

## 2020-07-08 NOTE — ED PROVIDER NOTE - NS ED ROS FT
ROS: denies HA, weakness, dizziness, fevers/chills, chest pain, SOB, diaphoresis, diarrhea, joint pain, neuro deficits, dysuria/hematuria, rash    +abd pain, n/v, constipation, weak stream of urine

## 2020-07-08 NOTE — ED ADULT NURSE NOTE - OBJECTIVE STATEMENT
P/W midsternal chest pain, generalized abdominal pain & vomiting, last regular BM "3-4 days ago" IV placed L arm 20G, placed on cardiac monitoring, pending CT & urine sample. CARLOS EDUARDO. Primary RN given report

## 2020-07-08 NOTE — ED ADULT TRIAGE NOTE - CHIEF COMPLAINT QUOTE
pt c/o having lower abdomen pain with n/v for 5 days. pt appears comfortable. pt denies chest pain, sob ,fevers, chills. pmh htn and bypass.

## 2020-07-08 NOTE — ED PROVIDER NOTE - NSFOLLOWUPINSTRUCTIONS_ED_ALL_ED_FT
You were seen in the ER for abdominal pain.  CT, labs were within normal limits.  Take miralax 17g daily for constipation for 1 week or until BM.  Take zofran for nausea.  Follow up with urology for weak urinary stream.  Return to ER for life threatening emergencies.  Follow up with your doctor.

## 2020-07-09 VITALS
OXYGEN SATURATION: 100 % | RESPIRATION RATE: 14 BRPM | HEART RATE: 76 BPM | SYSTOLIC BLOOD PRESSURE: 160 MMHG | DIASTOLIC BLOOD PRESSURE: 75 MMHG | TEMPERATURE: 98 F

## 2020-07-09 LAB
APPEARANCE UR: CLEAR — SIGNIFICANT CHANGE UP
BILIRUB UR-MCNC: NEGATIVE — SIGNIFICANT CHANGE UP
BLOOD UR QL VISUAL: NEGATIVE — SIGNIFICANT CHANGE UP
COLOR SPEC: SIGNIFICANT CHANGE UP
GLUCOSE UR-MCNC: 500 — HIGH
KETONES UR-MCNC: NEGATIVE — SIGNIFICANT CHANGE UP
LEUKOCYTE ESTERASE UR-ACNC: NEGATIVE — SIGNIFICANT CHANGE UP
NITRITE UR-MCNC: NEGATIVE — SIGNIFICANT CHANGE UP
PH UR: 6.5 — SIGNIFICANT CHANGE UP (ref 5–8)
PROT UR-MCNC: NEGATIVE — SIGNIFICANT CHANGE UP
RBC CASTS # UR COMP ASSIST: SIGNIFICANT CHANGE UP (ref 0–?)
SP GR SPEC: 1.02 — SIGNIFICANT CHANGE UP (ref 1–1.04)
TROPONIN T, HIGH SENSITIVITY: 26 NG/L — SIGNIFICANT CHANGE UP (ref ?–14)
UROBILINOGEN FLD QL: NORMAL — SIGNIFICANT CHANGE UP
WBC UR QL: SIGNIFICANT CHANGE UP (ref 0–?)

## 2020-07-09 PROCEDURE — 74177 CT ABD & PELVIS W/CONTRAST: CPT | Mod: 26

## 2020-07-09 RX ORDER — ONDANSETRON 8 MG/1
1 TABLET, FILM COATED ORAL
Qty: 12 | Refills: 0
Start: 2020-07-09 | End: 2020-07-11

## 2020-07-09 RX ORDER — POLYETHYLENE GLYCOL 3350 17 G/17G
17 POWDER, FOR SOLUTION ORAL
Qty: 119 | Refills: 0
Start: 2020-07-09 | End: 2020-07-15

## 2020-07-09 RX ORDER — POLYETHYLENE GLYCOL 3350 17 G/17G
17 POWDER, FOR SOLUTION ORAL ONCE
Refills: 0 | Status: COMPLETED | OUTPATIENT
Start: 2020-07-09 | End: 2020-07-09

## 2020-07-09 RX ADMIN — POLYETHYLENE GLYCOL 3350 17 GRAM(S): 17 POWDER, FOR SOLUTION ORAL at 04:16

## 2020-07-09 NOTE — ED ADULT NURSE REASSESSMENT NOTE - NS ED NURSE REASSESS COMMENT FT1
Pt A&Ox4, resting comfortably. Breathing even and unlabored. Vitals are stable. Complains of 4/10 abd pain, not in any distress. On IVF. Will continue to monitor.
Pt A&Ox4, resting comfortably. Breathing even and unlabored. Vitals are stable. Complains of 6/10 abd pain, pt not in any distress. Will continue to monitor.
Pt A&ox4, breathing even and unlabored. Verbalized relief from Morphine 4mg but states pain is back. Not in any distress. Will continue to monitor.
continues on cardiac monitoring

## 2020-07-10 LAB
CULTURE RESULTS: SIGNIFICANT CHANGE UP
SPECIMEN SOURCE: SIGNIFICANT CHANGE UP

## 2021-12-06 NOTE — PROVIDER CONTACT NOTE (CRITICAL VALUE NOTIFICATION) - DATE AND TIME:
14-Jun-2020 12:59 Call patient.  Please make an appointment for a physical exam.     14-Jun-2020 01:08

## 2022-02-08 NOTE — PROVIDER CONTACT NOTE (OTHER) - NAME OF MD/NP/PA/DO NOTIFIED:
Aletha Moscoso PA

## 2022-07-22 NOTE — PROGRESS NOTE ADULT - ASSESSMENT
67 y/o male with PMHx of HTN, T2DM, CAD s/p CABG 09/2014 & GUILLE to ostial (09/2015), % with patent grafts (with patent LIMA to LAD and patent SVG to diag), recent left Heart cath with nonobstructive disease in January 2019, presenting with worsening SOB for several days, +cough. He reports orthopnea  and worsening LE swelling.      # Dyspnea likely acute on chronic heart failure in the setting of volume overload with bibasilar crackles  s/p Lasix 40 x1 in ED  dinnamdie per cardiology, currently on Lasix IV 40 mg BID --> qdaily IV --> PO  TTE  LVEF= 50-55%., tele monitoring: no events  weaned off bipap, now on nasal canula to room air  stricts I/O, daily weights  no sick contact, doubt COVID19, nasal swab neg  CXR no evidence of PNA. monitor off abx.   started Solumedrol IV by pulm for COPD exacerbation. tapered to PO given hyperglycemia. now completely off.   c/w home meds: inhalers. duoneb PRN.   mild leukocytosis resolved.   neg blood cx, U/A, CXR, COVID PCR    # hx of CAD s/p CABG  cardio eval, resume home meds: Asa/Plavix/statin/betablocker  TTE as above  trend trops, likely demand ischemia  recent left Heart cath with nonobstructive disease   repeat [facility] 6/15/20 given intermittent chest pain, nonobstructive disease, grafts open.  Ranexa added for chest pain symptom management    # Acute kidney failure  likely in the setting of cardio renal etiology.   r/o obstructive etiology. check post void bladder scan.   trend Cr, holding Losartan per renal  monitor urine outpt  avoid nephrotoxic agents    #Diabetes:   hgbA1C 7.8%, start ISS  he takes Levemir 20 units QHS and metformin  sugars high due to IV steroids now tapered  c/w  premeal insulin to humalog 5 units TID. c/w Lantus 19 units QHS  now off steroids, monitor for hypoglycemia.   Endo consult appreciated.    DVT ppx None/Endoscopy

## 2022-11-07 NOTE — PATIENT PROFILE ADULT - NSPROHMDIABETMGMTSTRAT_GEN_A_NUR
insulin therapy Island Pedicle Flap With Canthal Suspension Text: The defect edges were debeveled with a #15 scalpel blade.  Given the location of the defect, shape of the defect and the proximity to free margins an island pedicle advancement flap was deemed most appropriate.  Using a sterile surgical marker, an appropriate advancement flap was drawn incorporating the defect, outlining the appropriate donor tissue and placing the expected incisions within the relaxed skin tension lines where possible. The area thus outlined was incised deep to adipose tissue with a #15 scalpel blade.  The skin margins were undermined to an appropriate distance in all directions around the primary defect and laterally outward around the island pedicle utilizing iris scissors.  There was minimal undermining beneath the pedicle flap. A suspension suture was placed in the canthal tendon to prevent tension and prevent ectropion.

## 2022-11-13 ENCOUNTER — INPATIENT (INPATIENT)
Facility: HOSPITAL | Age: 72
LOS: 124 days | Discharge: NURSING, REHAB OR LONG-TERM CARE | End: 2023-03-18
Attending: INTERNAL MEDICINE | Admitting: INTERNAL MEDICINE
Payer: MEDICAID

## 2022-11-13 VITALS — WEIGHT: 190.04 LBS | HEIGHT: 67 IN

## 2022-11-13 DIAGNOSIS — I45.10 UNSPECIFIED RIGHT BUNDLE-BRANCH BLOCK: ICD-10-CM
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
## 2022-11-13 DIAGNOSIS — K59.00 CONSTIPATION, UNSPECIFIED: ICD-10-CM

## 2022-11-13 DIAGNOSIS — Z79.82 LONG TERM (CURRENT) USE OF ASPIRIN: ICD-10-CM

## 2022-11-13 DIAGNOSIS — Z79.4 LONG TERM (CURRENT) USE OF INSULIN: ICD-10-CM

## 2022-11-13 DIAGNOSIS — R62.7 ADULT FAILURE TO THRIVE: ICD-10-CM

## 2022-11-13 DIAGNOSIS — B96.20 UNSPECIFIED ESCHERICHIA COLI [E. COLI] AS THE CAUSE OF DISEASES CLASSIFIED ELSEWHERE: ICD-10-CM

## 2022-11-13 DIAGNOSIS — M96.A3 MULTIPLE FRACTURES OF RIBS ASSOCIATED WITH CHEST COMPRESSION AND CARDIOPULMONARY RESUSCITATION: ICD-10-CM

## 2022-11-13 DIAGNOSIS — J20.8 ACUTE BRONCHITIS DUE TO OTHER SPECIFIED ORGANISMS: ICD-10-CM

## 2022-11-13 DIAGNOSIS — R13.10 DYSPHAGIA, UNSPECIFIED: ICD-10-CM

## 2022-11-13 DIAGNOSIS — I44.1 ATRIOVENTRICULAR BLOCK, SECOND DEGREE: ICD-10-CM

## 2022-11-13 DIAGNOSIS — G93.1 ANOXIC BRAIN DAMAGE, NOT ELSEWHERE CLASSIFIED: ICD-10-CM

## 2022-11-13 DIAGNOSIS — R53.2 FUNCTIONAL QUADRIPLEGIA: ICD-10-CM

## 2022-11-13 DIAGNOSIS — N18.30 CHRONIC KIDNEY DISEASE, STAGE 3 UNSPECIFIED: ICD-10-CM

## 2022-11-13 DIAGNOSIS — I34.0 NONRHEUMATIC MITRAL (VALVE) INSUFFICIENCY: ICD-10-CM

## 2022-11-13 DIAGNOSIS — N39.0 URINARY TRACT INFECTION, SITE NOT SPECIFIED: ICD-10-CM

## 2022-11-13 DIAGNOSIS — E11.649 TYPE 2 DIABETES MELLITUS WITH HYPOGLYCEMIA WITHOUT COMA: ICD-10-CM

## 2022-11-13 DIAGNOSIS — Z59.7 INSUFFICIENT SOCIAL INSURANCE AND WELFARE SUPPORT: ICD-10-CM

## 2022-11-13 DIAGNOSIS — I13.0 HYPERTENSIVE HEART AND CHRONIC KIDNEY DISEASE WITH HEART FAILURE AND STAGE 1 THROUGH STAGE 4 CHRONIC KIDNEY DISEASE, OR UNSPECIFIED CHRONIC KIDNEY DISEASE: ICD-10-CM

## 2022-11-13 DIAGNOSIS — N40.0 BENIGN PROSTATIC HYPERPLASIA WITHOUT LOWER URINARY TRACT SYMPTOMS: ICD-10-CM

## 2022-11-13 DIAGNOSIS — Z60.2 PROBLEMS RELATED TO LIVING ALONE: ICD-10-CM

## 2022-11-13 DIAGNOSIS — Z79.02 LONG TERM (CURRENT) USE OF ANTITHROMBOTICS/ANTIPLATELETS: ICD-10-CM

## 2022-11-13 DIAGNOSIS — B95.2 ENTEROCOCCUS AS THE CAUSE OF DISEASES CLASSIFIED ELSEWHERE: ICD-10-CM

## 2022-11-13 DIAGNOSIS — I96 GANGRENE, NOT ELSEWHERE CLASSIFIED: ICD-10-CM

## 2022-11-13 DIAGNOSIS — R91.8 OTHER NONSPECIFIC ABNORMAL FINDING OF LUNG FIELD: ICD-10-CM

## 2022-11-13 DIAGNOSIS — B44.1 OTHER PULMONARY ASPERGILLOSIS: ICD-10-CM

## 2022-11-13 DIAGNOSIS — R68.0 HYPOTHERMIA, NOT ASSOCIATED WITH LOW ENVIRONMENTAL TEMPERATURE: ICD-10-CM

## 2022-11-13 DIAGNOSIS — I82.B12 ACUTE EMBOLISM AND THROMBOSIS OF LEFT SUBCLAVIAN VEIN: ICD-10-CM

## 2022-11-13 DIAGNOSIS — N17.0 ACUTE KIDNEY FAILURE WITH TUBULAR NECROSIS: ICD-10-CM

## 2022-11-13 DIAGNOSIS — J44.9 CHRONIC OBSTRUCTIVE PULMONARY DISEASE, UNSPECIFIED: ICD-10-CM

## 2022-11-13 DIAGNOSIS — Z95.5 PRESENCE OF CORONARY ANGIOPLASTY IMPLANT AND GRAFT: ICD-10-CM

## 2022-11-13 DIAGNOSIS — K72.00 ACUTE AND SUBACUTE HEPATIC FAILURE WITHOUT COMA: ICD-10-CM

## 2022-11-13 DIAGNOSIS — J96.21 ACUTE AND CHRONIC RESPIRATORY FAILURE WITH HYPOXIA: ICD-10-CM

## 2022-11-13 DIAGNOSIS — R04.2 HEMOPTYSIS: ICD-10-CM

## 2022-11-13 DIAGNOSIS — Z86.73 PERSONAL HISTORY OF TRANSIENT ISCHEMIC ATTACK (TIA), AND CEREBRAL INFARCTION WITHOUT RESIDUAL DEFICITS: ICD-10-CM

## 2022-11-13 DIAGNOSIS — Z79.51 LONG TERM (CURRENT) USE OF INHALED STEROIDS: ICD-10-CM

## 2022-11-13 DIAGNOSIS — Z95.1 PRESENCE OF AORTOCORONARY BYPASS GRAFT: ICD-10-CM

## 2022-11-13 DIAGNOSIS — E11.22 TYPE 2 DIABETES MELLITUS WITH DIABETIC CHRONIC KIDNEY DISEASE: ICD-10-CM

## 2022-11-13 DIAGNOSIS — G40.901 EPILEPSY, UNSPECIFIED, NOT INTRACTABLE, WITH STATUS EPILEPTICUS: ICD-10-CM

## 2022-11-13 DIAGNOSIS — L89.154 PRESSURE ULCER OF SACRAL REGION, STAGE 4: ICD-10-CM

## 2022-11-13 DIAGNOSIS — Z78.1 PHYSICAL RESTRAINT STATUS: ICD-10-CM

## 2022-11-13 DIAGNOSIS — D63.1 ANEMIA IN CHRONIC KIDNEY DISEASE: ICD-10-CM

## 2022-11-13 DIAGNOSIS — Z95.1 PRESENCE OF AORTOCORONARY BYPASS GRAFT: Chronic | ICD-10-CM

## 2022-11-13 DIAGNOSIS — Z20.822 CONTACT WITH AND (SUSPECTED) EXPOSURE TO COVID-19: ICD-10-CM

## 2022-11-13 DIAGNOSIS — I50.32 CHRONIC DIASTOLIC (CONGESTIVE) HEART FAILURE: ICD-10-CM
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
## 2022-11-13 DIAGNOSIS — L02.512 CUTANEOUS ABSCESS OF LEFT HAND: ICD-10-CM

## 2022-11-13 DIAGNOSIS — L03.114 CELLULITIS OF LEFT UPPER LIMB: ICD-10-CM

## 2022-11-13 DIAGNOSIS — I49.3 VENTRICULAR PREMATURE DEPOLARIZATION: ICD-10-CM

## 2022-11-13 DIAGNOSIS — E83.39 OTHER DISORDERS OF PHOSPHORUS METABOLISM: ICD-10-CM

## 2022-11-13 DIAGNOSIS — I25.10 ATHEROSCLEROTIC HEART DISEASE OF NATIVE CORONARY ARTERY WITHOUT ANGINA PECTORIS: ICD-10-CM

## 2022-11-13 DIAGNOSIS — A41.9 SEPSIS, UNSPECIFIED ORGANISM: ICD-10-CM

## 2022-11-13 DIAGNOSIS — Z95.0 PRESENCE OF CARDIAC PACEMAKER: ICD-10-CM

## 2022-11-13 DIAGNOSIS — G93.89 OTHER SPECIFIED DISORDERS OF BRAIN: ICD-10-CM

## 2022-11-13 DIAGNOSIS — G93.41 METABOLIC ENCEPHALOPATHY: ICD-10-CM

## 2022-11-13 DIAGNOSIS — I27.20 PULMONARY HYPERTENSION, UNSPECIFIED: ICD-10-CM

## 2022-11-13 DIAGNOSIS — G47.33 OBSTRUCTIVE SLEEP APNEA (ADULT) (PEDIATRIC): ICD-10-CM

## 2022-11-13 DIAGNOSIS — E78.5 HYPERLIPIDEMIA, UNSPECIFIED: ICD-10-CM

## 2022-11-13 DIAGNOSIS — J95.03 MALFUNCTION OF TRACHEOSTOMY STOMA: ICD-10-CM

## 2022-11-13 DIAGNOSIS — J96.22 ACUTE AND CHRONIC RESPIRATORY FAILURE WITH HYPERCAPNIA: ICD-10-CM

## 2022-11-13 DIAGNOSIS — E44.0 MODERATE PROTEIN-CALORIE MALNUTRITION: ICD-10-CM

## 2022-11-13 DIAGNOSIS — E87.0 HYPEROSMOLALITY AND HYPERNATREMIA: ICD-10-CM

## 2022-11-13 LAB
APTT BLD: 31.7 SEC — SIGNIFICANT CHANGE UP (ref 27.5–35.5)
BASE EXCESS BLDA CALC-SCNC: -13.8 MMOL/L — LOW (ref -2–3)
BASOPHILS # BLD AUTO: 0.08 K/UL — SIGNIFICANT CHANGE UP (ref 0–0.2)
BASOPHILS NFR BLD AUTO: 0.7 % — SIGNIFICANT CHANGE UP (ref 0–2)
BLOOD GAS COMMENTS ARTERIAL: SIGNIFICANT CHANGE UP
CO2 BLDA-SCNC: 19 MMOL/L — SIGNIFICANT CHANGE UP (ref 19–24)
EOSINOPHIL # BLD AUTO: 0.64 K/UL — HIGH (ref 0–0.5)
EOSINOPHIL NFR BLD AUTO: 5.3 % — SIGNIFICANT CHANGE UP (ref 0–6)
FLUAV AG NPH QL: SIGNIFICANT CHANGE UP
FLUBV AG NPH QL: SIGNIFICANT CHANGE UP
GAS PNL BLDA: SIGNIFICANT CHANGE UP
GLUCOSE BLDC GLUCOMTR-MCNC: 349 MG/DL — HIGH (ref 70–99)
GLUCOSE BLDC GLUCOMTR-MCNC: 70 MG/DL — SIGNIFICANT CHANGE UP (ref 70–99)
HCO3 BLDA-SCNC: 17 MMOL/L — LOW (ref 21–28)
HCT VFR BLD CALC: 47.7 % — SIGNIFICANT CHANGE UP (ref 39–50)
HGB BLD-MCNC: 15.1 G/DL — SIGNIFICANT CHANGE UP (ref 13–17)
HOROWITZ INDEX BLDA+IHG-RTO: 40 — SIGNIFICANT CHANGE UP
IMM GRANULOCYTES NFR BLD AUTO: 0.2 % — SIGNIFICANT CHANGE UP (ref 0–0.9)
INR BLD: 0.89 RATIO — SIGNIFICANT CHANGE UP (ref 0.88–1.16)
LYMPHOCYTES # BLD AUTO: 3.81 K/UL — HIGH (ref 1–3.3)
LYMPHOCYTES # BLD AUTO: 31.4 % — SIGNIFICANT CHANGE UP (ref 13–44)
MCHC RBC-ENTMCNC: 30.1 PG — SIGNIFICANT CHANGE UP (ref 27–34)
MCHC RBC-ENTMCNC: 31.7 G/DL — LOW (ref 32–36)
MCV RBC AUTO: 95 FL — SIGNIFICANT CHANGE UP (ref 80–100)
MONOCYTES # BLD AUTO: 0.9 K/UL — SIGNIFICANT CHANGE UP (ref 0–0.9)
MONOCYTES NFR BLD AUTO: 7.4 % — SIGNIFICANT CHANGE UP (ref 2–14)
NEUTROPHILS # BLD AUTO: 6.68 K/UL — SIGNIFICANT CHANGE UP (ref 1.8–7.4)
NEUTROPHILS NFR BLD AUTO: 55 % — SIGNIFICANT CHANGE UP (ref 43–77)
NRBC # BLD: 0 /100 WBCS — SIGNIFICANT CHANGE UP (ref 0–0)
PCO2 BLDA: 60 MMHG — HIGH (ref 32–46)
PH BLDA: 7.06 — CRITICAL LOW (ref 7.35–7.45)
PLATELET # BLD AUTO: 205 K/UL — SIGNIFICANT CHANGE UP (ref 150–400)
PO2 BLDA: 148 MMHG — HIGH (ref 83–108)
PROTHROM AB SERPL-ACNC: 10.7 SEC — SIGNIFICANT CHANGE UP (ref 10.5–13.4)
RBC # BLD: 5.02 M/UL — SIGNIFICANT CHANGE UP (ref 4.2–5.8)
RBC # FLD: 13.2 % — SIGNIFICANT CHANGE UP (ref 10.3–14.5)
SAO2 % BLDA: 98.8 % — HIGH (ref 94–98)
SARS-COV-2 RNA SPEC QL NAA+PROBE: SIGNIFICANT CHANGE UP
TROPONIN I, HIGH SENSITIVITY RESULT: 17.7 NG/L — SIGNIFICANT CHANGE UP
WBC # BLD: 12.14 K/UL — HIGH (ref 3.8–10.5)
WBC # FLD AUTO: 12.14 K/UL — HIGH (ref 3.8–10.5)

## 2022-11-13 PROCEDURE — 93010 ELECTROCARDIOGRAM REPORT: CPT

## 2022-11-13 PROCEDURE — 71045 X-RAY EXAM CHEST 1 VIEW: CPT | Mod: 26

## 2022-11-13 PROCEDURE — 99291 CRITICAL CARE FIRST HOUR: CPT

## 2022-11-13 RX ORDER — VANCOMYCIN HCL 1 G
1000 VIAL (EA) INTRAVENOUS ONCE
Refills: 0 | Status: COMPLETED | OUTPATIENT
Start: 2022-11-13 | End: 2022-11-13

## 2022-11-13 RX ORDER — SODIUM CHLORIDE 9 MG/ML
2700 INJECTION INTRAMUSCULAR; INTRAVENOUS; SUBCUTANEOUS ONCE
Refills: 0 | Status: COMPLETED | OUTPATIENT
Start: 2022-11-13 | End: 2022-11-13

## 2022-11-13 RX ORDER — CHLORHEXIDINE GLUCONATE 213 G/1000ML
15 SOLUTION TOPICAL EVERY 12 HOURS
Refills: 0 | Status: DISCONTINUED | OUTPATIENT
Start: 2022-11-13 | End: 2022-11-25

## 2022-11-13 RX ORDER — PIPERACILLIN AND TAZOBACTAM 4; .5 G/20ML; G/20ML
3.38 INJECTION, POWDER, LYOPHILIZED, FOR SOLUTION INTRAVENOUS ONCE
Refills: 0 | Status: COMPLETED | OUTPATIENT
Start: 2022-11-13 | End: 2022-11-13

## 2022-11-13 RX ORDER — NOREPINEPHRINE BITARTRATE/D5W 8 MG/250ML
0.05 PLASTIC BAG, INJECTION (ML) INTRAVENOUS
Qty: 8 | Refills: 0 | Status: DISCONTINUED | OUTPATIENT
Start: 2022-11-13 | End: 2022-11-18

## 2022-11-13 RX ADMIN — SODIUM CHLORIDE 2700 MILLILITER(S): 9 INJECTION INTRAMUSCULAR; INTRAVENOUS; SUBCUTANEOUS at 22:57

## 2022-11-13 RX ADMIN — Medication 250 MILLIGRAM(S): at 23:58

## 2022-11-13 RX ADMIN — PIPERACILLIN AND TAZOBACTAM 200 GRAM(S): 4; .5 INJECTION, POWDER, LYOPHILIZED, FOR SOLUTION INTRAVENOUS at 22:57

## 2022-11-13 SDOH — ECONOMIC STABILITY - INCOME SECURITY: INSUFFICIENT SOCIAL INSURANCE AND WELFARE SUPPORT: Z59.7

## 2022-11-13 SDOH — SOCIAL STABILITY - SOCIAL INSECURITY: PROBLEMS RELATED TO LIVING ALONE: Z60.2

## 2022-11-13 NOTE — PROVIDER CONTACT NOTE (EICU) - ACTION/TREATMENT ORDERED:
Case d/w ED MD as well as ICU ACP  will sign out case to eicu team to f/u once icu acp sees patient.

## 2022-11-13 NOTE — PROVIDER CONTACT NOTE (EICU) - ASSESSMENT
s/p PEA arrest  Acute hypoxic respiratory failure   Hypoglycemia  in patient with DME  CAD s/p CABG  underlying htn, high chol

## 2022-11-13 NOTE — ED PROVIDER NOTE - CARE PLAN
Principal Discharge DX:	Hypoglycemia  Secondary Diagnosis:	Cardiac arrest  Secondary Diagnosis:	Hypothermia, not associated with low environmental temperature   1 Principal Discharge DX:	Hypoglycemia  Secondary Diagnosis:	Cardiac arrest  Secondary Diagnosis:	Hypothermia, not associated with low environmental temperature  Secondary Diagnosis:	Rib fracture

## 2022-11-13 NOTE — ED ADULT NURSE NOTE - NSICDXPASTMEDICALHX_GEN_ALL_CORE_FT
PAST MEDICAL HISTORY:  BPH (benign prostatic hyperplasia)     CHF (congestive heart failure)     Chronic obstructive pulmonary disease, unspecified COPD type     Diabetes mellitus     HLD (hyperlipidemia)     HTN (hypertension)     Lacunar infarction

## 2022-11-13 NOTE — ED ADULT NURSE NOTE - NSSEPSISSUSPECTED_ED_A_ED
Impression: Type 2 diabetes mellitus with proliferative diabetic retinopathy with macular edema, left eye: Y99.0965. Plan: PRP done OS **missed OD appointment, will reschedule RTC PRP OD Yes

## 2022-11-13 NOTE — ED PROVIDER NOTE - PHYSICAL EXAMINATION
Gen: Obtunded, ill appearing  Head: NC, AT, normal lids/conjunctiva  ENT: patent oropharynx without erythema/exudate, uvula midline  Neck: +supple, +Trachea midline  Pulm: Bilateral BS, increased resp effort, agonal breathing  CV: thready pulses  Abd: soft, NT/ND, no palpable masses  Mskel: no lateralized edema/erythema/cyanosis  Skin: no rash, warm/dry  Neuro: Unresponsive, pupils pinpoint and minimally reaction, no gag, no corneal reflex, no dolls eye

## 2022-11-13 NOTE — ED PROVIDER NOTE - CLINICAL SUMMARY MEDICAL DECISION MAKING FREE TEXT BOX
Patient brought to ER for hypoglycemia, decompensated almost immediately on arrival, no improvement of his mental status with D 50 given on arrival.  Patient became code blue, CPR initiated, intubated, ROSC achieved.  Patient pan scanned, rib fractures from CPR most likely.  Patient son present in ER prepared for possible poor neurologic outcome based on CT imaging.  Patient hypothermic on rectal temp, treated for cold sepsis without clear source at this time.  Patient is to be admitted to the hospital and the case was discussed with the admitting physician.  Any changes in plan, additional imaging/labs, and further work up will be at the discretion of the admitting physician. Per discussion with admitting physician, all necessary consults for admitted patients to be obtained by morning team unless patient requires emergent intervention or medical advice by specialist overnight.

## 2022-11-13 NOTE — PROVIDER CONTACT NOTE (EICU) - BACKGROUND
72 year old male with HTN, high chol, DM, CAD s/p CABG,   p/w AMS with hypoglycemia to 42, combative on way to ED and was agonal breathing in ED where had PEA arrest in ED ~ ROSC with in 5 min with 2 rounds of Epi  Currently intubated, unresponsive and hypothermic, not hypotensive  all labs are currently pending except ABG and glucose

## 2022-11-13 NOTE — ED ADULT NURSE NOTE - OBJECTIVE STATEMENT
Pt received in bed 5, biba. As per EMS, pt found by son to be altered with snorous respirations at home. upon arrival of EMS, pt was noted to have a FS of 42, glucagon given. Pt arrived to ED altered with iv ripped out due to pt becoming combative. Pt not answering questions at this time. FS repeated and noted to be 70. pt appears to have agonal breathing, pt moved to bed 4, pt became unresponsive, and pulseless, code blue called, ACLS initiated.

## 2022-11-13 NOTE — ED PROVIDER NOTE - OBJECTIVE STATEMENT
Pertinent PMH/PSH/FHx/SHx and Review of Systems contained within:  Patient presents to the ED for hypoglycemia.  Patient abhay, was found by son after he'd come home from work altered with sonorous respirations. Patient was not responsive, FS was 42 on EMS arrival.  Patient was given glucagon in the field and on arrival is still altered with blood glucose 70.  Patient was combative en route with EMS and ripped his IV out.  Patient not answering questions, moving all extremities.  Shortly after arriving in ER patient became unresponsive and pulseless, code blue called, CPR started.      Relevant PMHx/SHx/SOCHx/FAMH:  HTN, HLD, Stage 3 CKD, CHF, DM, cataracts  Nonsmoker Pertinent PMH/PSH/FHx/SHx and Review of Systems contained within:  Patient presents to the ED for hypoglycemia.  Patient abhay, was found by son after he'd come home from work altered with sonorous respirations. Patient was not responsive, FS was 42 on EMS arrival.  Patient was given glucagon in the field and on arrival is still altered with blood glucose 70.  Patient was combative en route with EMS and ripped his IV out.  Patient not answering questions, moving all extremities.  Shortly after arriving in ER patient became unresponsive and pulseless, code blue called, CPR started.      Relevant PMHx/SHx/SOCHx/FAMH:  HTN, HLD, Stage 3 CKD, CHF, DM, cataracts, pacemaker  Nonsmoker

## 2022-11-13 NOTE — PROVIDER CONTACT NOTE (EICU) - RECOMMENDATIONS
Admit to ICU  f/u repeat labs   based on results and if candidate would  decide on targeted temprature therapy  avoid hyperthermia, goal to keep temp < 94-95 degrees  RR increased post intuabtion  Check CT head/chest/abd/pel;vis   neuro checks  f/u ekg, cardiac enzymes  GI/DVT ppx

## 2022-11-13 NOTE — ED ADULT TRIAGE NOTE - CHIEF COMPLAINT QUOTE
BIBA,  hypoglycemic.  pt unresponsive.  glucagon given by EMS BIBA,  hypoglycemic.  pt unresponsive. pt found by son half-lying off bed.  unknown downtime.  blood in mouth.  cold to touch.  glucagon given by EMS  (PMH-DM, PM, CHF)

## 2022-11-13 NOTE — PROVIDER CONTACT NOTE (EICU) - SITUATION
Provider Location: [facility].   Patient Location: [facility] ED  Was contacted by ED MD

## 2022-11-13 NOTE — ED ADULT NURSE NOTE - CHIEF COMPLAINT QUOTE
BIBA,  hypoglycemic.  pt unresponsive. pt found by son half-lying off bed.  unknown downtime.  blood in mouth.  cold to touch.  glucagon given by EMS  (PMH-DM, PM, CHF)

## 2022-11-14 LAB
A1C WITH ESTIMATED AVERAGE GLUCOSE RESULT: 8.8 % — HIGH (ref 4–5.6)
ALBUMIN SERPL ELPH-MCNC: 3.3 G/DL — SIGNIFICANT CHANGE UP (ref 3.3–5)
ALBUMIN SERPL ELPH-MCNC: 3.9 G/DL — SIGNIFICANT CHANGE UP (ref 3.3–5)
ALBUMIN SERPL ELPH-MCNC: 3.9 G/DL — SIGNIFICANT CHANGE UP (ref 3.3–5)
ALP SERPL-CCNC: 66 U/L — SIGNIFICANT CHANGE UP (ref 40–120)
ALP SERPL-CCNC: 69 U/L — SIGNIFICANT CHANGE UP (ref 40–120)
ALP SERPL-CCNC: 76 U/L — SIGNIFICANT CHANGE UP (ref 40–120)
ALT FLD-CCNC: 241 U/L — HIGH (ref 12–78)
ALT FLD-CCNC: 245 U/L — HIGH (ref 12–78)
ALT FLD-CCNC: 26 U/L — SIGNIFICANT CHANGE UP (ref 12–78)
ANION GAP SERPL CALC-SCNC: 11 MMOL/L — SIGNIFICANT CHANGE UP (ref 5–17)
ANION GAP SERPL CALC-SCNC: 16 MMOL/L — SIGNIFICANT CHANGE UP (ref 5–17)
ANION GAP SERPL CALC-SCNC: 9 MMOL/L — SIGNIFICANT CHANGE UP (ref 5–17)
APPEARANCE UR: CLEAR — SIGNIFICANT CHANGE UP
AST SERPL-CCNC: 185 U/L — HIGH (ref 15–37)
AST SERPL-CCNC: 296 U/L — HIGH (ref 15–37)
AST SERPL-CCNC: 30 U/L — SIGNIFICANT CHANGE UP (ref 15–37)
BACTERIA # UR AUTO: ABNORMAL
BASOPHILS # BLD AUTO: 0.02 K/UL — SIGNIFICANT CHANGE UP (ref 0–0.2)
BASOPHILS NFR BLD AUTO: 0.2 % — SIGNIFICANT CHANGE UP (ref 0–2)
BILIRUB SERPL-MCNC: 0.7 MG/DL — SIGNIFICANT CHANGE UP (ref 0.2–1.2)
BILIRUB SERPL-MCNC: 0.9 MG/DL — SIGNIFICANT CHANGE UP (ref 0.2–1.2)
BILIRUB SERPL-MCNC: 1.1 MG/DL — SIGNIFICANT CHANGE UP (ref 0.2–1.2)
BILIRUB UR-MCNC: NEGATIVE — SIGNIFICANT CHANGE UP
BUN SERPL-MCNC: 23 MG/DL — SIGNIFICANT CHANGE UP (ref 7–23)
BUN SERPL-MCNC: 23 MG/DL — SIGNIFICANT CHANGE UP (ref 7–23)
BUN SERPL-MCNC: 27 MG/DL — HIGH (ref 7–23)
C PEPTIDE SERPL-MCNC: 2.8 NG/ML — SIGNIFICANT CHANGE UP (ref 1.1–4.4)
CALCIUM SERPL-MCNC: 8.5 MG/DL — SIGNIFICANT CHANGE UP (ref 8.5–10.1)
CALCIUM SERPL-MCNC: 8.5 MG/DL — SIGNIFICANT CHANGE UP (ref 8.5–10.1)
CALCIUM SERPL-MCNC: 9.1 MG/DL — SIGNIFICANT CHANGE UP (ref 8.5–10.1)
CHLORIDE SERPL-SCNC: 100 MMOL/L — SIGNIFICANT CHANGE UP (ref 96–108)
CHLORIDE SERPL-SCNC: 102 MMOL/L — SIGNIFICANT CHANGE UP (ref 96–108)
CHLORIDE SERPL-SCNC: 104 MMOL/L — SIGNIFICANT CHANGE UP (ref 96–108)
CK MB BLD-MCNC: 4.4 % — HIGH (ref 0–3.5)
CK MB CFR SERPL CALC: 4.3 NG/ML — HIGH (ref 0.5–3.6)
CK SERPL-CCNC: 98 U/L — SIGNIFICANT CHANGE UP (ref 26–308)
CO2 SERPL-SCNC: 17 MMOL/L — LOW (ref 22–31)
CO2 SERPL-SCNC: 20 MMOL/L — LOW (ref 22–31)
CO2 SERPL-SCNC: 21 MMOL/L — LOW (ref 22–31)
COLOR SPEC: YELLOW — SIGNIFICANT CHANGE UP
CREAT SERPL-MCNC: 1.12 MG/DL — SIGNIFICANT CHANGE UP (ref 0.5–1.3)
CREAT SERPL-MCNC: 1.34 MG/DL — HIGH (ref 0.5–1.3)
CREAT SERPL-MCNC: 1.58 MG/DL — HIGH (ref 0.5–1.3)
DIFF PNL FLD: ABNORMAL
EGFR: 46 ML/MIN/1.73M2 — LOW
EGFR: 56 ML/MIN/1.73M2 — LOW
EGFR: 69 ML/MIN/1.73M2 — SIGNIFICANT CHANGE UP
EOSINOPHIL # BLD AUTO: 0.03 K/UL — SIGNIFICANT CHANGE UP (ref 0–0.5)
EOSINOPHIL NFR BLD AUTO: 0.3 % — SIGNIFICANT CHANGE UP (ref 0–6)
EPI CELLS # UR: SIGNIFICANT CHANGE UP
ESTIMATED AVERAGE GLUCOSE: 206 MG/DL — HIGH (ref 68–114)
GAS PNL BLDA: SIGNIFICANT CHANGE UP
GLUCOSE BLDC GLUCOMTR-MCNC: 138 MG/DL — HIGH (ref 70–99)
GLUCOSE BLDC GLUCOMTR-MCNC: 144 MG/DL — HIGH (ref 70–99)
GLUCOSE BLDC GLUCOMTR-MCNC: 146 MG/DL — HIGH (ref 70–99)
GLUCOSE BLDC GLUCOMTR-MCNC: 149 MG/DL — HIGH (ref 70–99)
GLUCOSE BLDC GLUCOMTR-MCNC: 151 MG/DL — HIGH (ref 70–99)
GLUCOSE BLDC GLUCOMTR-MCNC: 157 MG/DL — HIGH (ref 70–99)
GLUCOSE BLDC GLUCOMTR-MCNC: 158 MG/DL — HIGH (ref 70–99)
GLUCOSE BLDC GLUCOMTR-MCNC: 161 MG/DL — HIGH (ref 70–99)
GLUCOSE BLDC GLUCOMTR-MCNC: 201 MG/DL — HIGH (ref 70–99)
GLUCOSE BLDC GLUCOMTR-MCNC: 221 MG/DL — HIGH (ref 70–99)
GLUCOSE BLDC GLUCOMTR-MCNC: 221 MG/DL — HIGH (ref 70–99)
GLUCOSE BLDC GLUCOMTR-MCNC: 243 MG/DL — HIGH (ref 70–99)
GLUCOSE SERPL-MCNC: 136 MG/DL — HIGH (ref 70–99)
GLUCOSE SERPL-MCNC: 163 MG/DL — HIGH (ref 70–99)
GLUCOSE SERPL-MCNC: 46 MG/DL — CRITICAL LOW (ref 70–99)
GLUCOSE UR QL: 1000 MG/DL
GRAM STN FLD: SIGNIFICANT CHANGE UP
HCT VFR BLD CALC: 39.7 % — SIGNIFICANT CHANGE UP (ref 39–50)
HGB BLD-MCNC: 13.3 G/DL — SIGNIFICANT CHANGE UP (ref 13–17)
HYALINE CASTS # UR AUTO: ABNORMAL /LPF
IMM GRANULOCYTES NFR BLD AUTO: 0.5 % — SIGNIFICANT CHANGE UP (ref 0–0.9)
KETONES UR-MCNC: NEGATIVE — SIGNIFICANT CHANGE UP
LACTATE SERPL-SCNC: 1.3 MMOL/L — SIGNIFICANT CHANGE UP (ref 0.7–2)
LACTATE SERPL-SCNC: 5.3 MMOL/L — CRITICAL HIGH (ref 0.7–2)
LEUKOCYTE ESTERASE UR-ACNC: NEGATIVE — SIGNIFICANT CHANGE UP
LYMPHOCYTES # BLD AUTO: 0.66 K/UL — LOW (ref 1–3.3)
LYMPHOCYTES # BLD AUTO: 6 % — LOW (ref 13–44)
MAGNESIUM SERPL-MCNC: 2.1 MG/DL — SIGNIFICANT CHANGE UP (ref 1.6–2.6)
MCHC RBC-ENTMCNC: 30.2 PG — SIGNIFICANT CHANGE UP (ref 27–34)
MCHC RBC-ENTMCNC: 33.5 G/DL — SIGNIFICANT CHANGE UP (ref 32–36)
MCV RBC AUTO: 90 FL — SIGNIFICANT CHANGE UP (ref 80–100)
MONOCYTES # BLD AUTO: 1 K/UL — HIGH (ref 0–0.9)
MONOCYTES NFR BLD AUTO: 9.1 % — SIGNIFICANT CHANGE UP (ref 2–14)
MRSA PCR RESULT.: SIGNIFICANT CHANGE UP
NEUTROPHILS # BLD AUTO: 9.2 K/UL — HIGH (ref 1.8–7.4)
NEUTROPHILS NFR BLD AUTO: 83.9 % — HIGH (ref 43–77)
NITRITE UR-MCNC: NEGATIVE — SIGNIFICANT CHANGE UP
NRBC # BLD: 0 /100 WBCS — SIGNIFICANT CHANGE UP (ref 0–0)
NT-PROBNP SERPL-SCNC: 3360 PG/ML — HIGH (ref 0–125)
PH UR: 6 — SIGNIFICANT CHANGE UP (ref 5–8)
PHOSPHATE SERPL-MCNC: 3.1 MG/DL — SIGNIFICANT CHANGE UP (ref 2.5–4.5)
PLATELET # BLD AUTO: 175 K/UL — SIGNIFICANT CHANGE UP (ref 150–400)
POTASSIUM SERPL-MCNC: 4.4 MMOL/L — SIGNIFICANT CHANGE UP (ref 3.5–5.3)
POTASSIUM SERPL-MCNC: 4.8 MMOL/L — SIGNIFICANT CHANGE UP (ref 3.5–5.3)
POTASSIUM SERPL-MCNC: 5.3 MMOL/L — SIGNIFICANT CHANGE UP (ref 3.5–5.3)
POTASSIUM SERPL-SCNC: 4.4 MMOL/L — SIGNIFICANT CHANGE UP (ref 3.5–5.3)
POTASSIUM SERPL-SCNC: 4.8 MMOL/L — SIGNIFICANT CHANGE UP (ref 3.5–5.3)
POTASSIUM SERPL-SCNC: 5.3 MMOL/L — SIGNIFICANT CHANGE UP (ref 3.5–5.3)
PROCALCITONIN SERPL-MCNC: 0.24 NG/ML — HIGH (ref 0.02–0.1)
PROT SERPL-MCNC: 6.4 GM/DL — SIGNIFICANT CHANGE UP (ref 6–8.3)
PROT SERPL-MCNC: 7.4 GM/DL — SIGNIFICANT CHANGE UP (ref 6–8.3)
PROT SERPL-MCNC: 8 GM/DL — SIGNIFICANT CHANGE UP (ref 6–8.3)
PROT UR-MCNC: 100 MG/DL
RBC # BLD: 4.41 M/UL — SIGNIFICANT CHANGE UP (ref 4.2–5.8)
RBC # FLD: 13 % — SIGNIFICANT CHANGE UP (ref 10.3–14.5)
RBC CASTS # UR COMP ASSIST: ABNORMAL /HPF (ref 0–4)
S AUREUS DNA NOSE QL NAA+PROBE: SIGNIFICANT CHANGE UP
SODIUM SERPL-SCNC: 133 MMOL/L — LOW (ref 135–145)
SODIUM SERPL-SCNC: 133 MMOL/L — LOW (ref 135–145)
SODIUM SERPL-SCNC: 134 MMOL/L — LOW (ref 135–145)
SP GR SPEC: 1.01 — SIGNIFICANT CHANGE UP (ref 1.01–1.02)
SPECIMEN SOURCE: SIGNIFICANT CHANGE UP
T4 FREE SERPL-MCNC: 2.1 NG/DL — HIGH (ref 0.9–1.8)
TROPONIN I, HIGH SENSITIVITY RESULT: 110.7 NG/L — HIGH
TROPONIN I, HIGH SENSITIVITY RESULT: 54.6 NG/L — SIGNIFICANT CHANGE UP
TROPONIN I, HIGH SENSITIVITY RESULT: 82.5 NG/L — HIGH
TSH SERPL-MCNC: 1.09 UIU/ML — SIGNIFICANT CHANGE UP (ref 0.36–3.74)
UROBILINOGEN FLD QL: NEGATIVE MG/DL — SIGNIFICANT CHANGE UP
WBC # BLD: 10.97 K/UL — HIGH (ref 3.8–10.5)
WBC # FLD AUTO: 10.97 K/UL — HIGH (ref 3.8–10.5)
WBC UR QL: NEGATIVE — SIGNIFICANT CHANGE UP

## 2022-11-14 PROCEDURE — 93306 TTE W/DOPPLER COMPLETE: CPT | Mod: 26

## 2022-11-14 PROCEDURE — 99291 CRITICAL CARE FIRST HOUR: CPT

## 2022-11-14 PROCEDURE — 93010 ELECTROCARDIOGRAM REPORT: CPT

## 2022-11-14 PROCEDURE — 71275 CT ANGIOGRAPHY CHEST: CPT | Mod: 26,MA

## 2022-11-14 PROCEDURE — 71045 X-RAY EXAM CHEST 1 VIEW: CPT | Mod: 26

## 2022-11-14 PROCEDURE — 99292 CRITICAL CARE ADDL 30 MIN: CPT

## 2022-11-14 PROCEDURE — 70450 CT HEAD/BRAIN W/O DYE: CPT | Mod: 26,MA

## 2022-11-14 PROCEDURE — 74177 CT ABD & PELVIS W/CONTRAST: CPT | Mod: 26,MA

## 2022-11-14 RX ORDER — ASPIRIN/CALCIUM CARB/MAGNESIUM 324 MG
324 TABLET ORAL ONCE
Refills: 0 | Status: COMPLETED | OUTPATIENT
Start: 2022-11-14 | End: 2022-11-14

## 2022-11-14 RX ORDER — ASPIRIN/CALCIUM CARB/MAGNESIUM 324 MG
81 TABLET ORAL DAILY
Refills: 0 | Status: DISCONTINUED | OUTPATIENT
Start: 2022-11-14 | End: 2023-03-10

## 2022-11-14 RX ORDER — PROPOFOL 10 MG/ML
5 INJECTION, EMULSION INTRAVENOUS
Qty: 1000 | Refills: 0 | Status: DISCONTINUED | OUTPATIENT
Start: 2022-11-14 | End: 2022-11-15

## 2022-11-14 RX ORDER — HEPARIN SODIUM 5000 [USP'U]/ML
5000 INJECTION INTRAVENOUS; SUBCUTANEOUS EVERY 8 HOURS
Refills: 0 | Status: DISCONTINUED | OUTPATIENT
Start: 2022-11-14 | End: 2022-12-12

## 2022-11-14 RX ORDER — MIDAZOLAM HYDROCHLORIDE 1 MG/ML
4 INJECTION, SOLUTION INTRAMUSCULAR; INTRAVENOUS ONCE
Refills: 0 | Status: DISCONTINUED | OUTPATIENT
Start: 2022-11-14 | End: 2022-11-14

## 2022-11-14 RX ORDER — CHLORHEXIDINE GLUCONATE 213 G/1000ML
1 SOLUTION TOPICAL
Refills: 0 | Status: DISCONTINUED | OUTPATIENT
Start: 2022-11-14 | End: 2023-03-18

## 2022-11-14 RX ORDER — FENTANYL CITRATE 50 UG/ML
50 INJECTION INTRAVENOUS EVERY 6 HOURS
Refills: 0 | Status: DISCONTINUED | OUTPATIENT
Start: 2022-11-14 | End: 2022-11-18

## 2022-11-14 RX ORDER — LEVETIRACETAM 250 MG/1
500 TABLET, FILM COATED ORAL EVERY 12 HOURS
Refills: 0 | Status: DISCONTINUED | OUTPATIENT
Start: 2022-11-14 | End: 2022-11-15

## 2022-11-14 RX ORDER — FENTANYL CITRATE 50 UG/ML
0.5 INJECTION INTRAVENOUS
Qty: 2500 | Refills: 0 | Status: DISCONTINUED | OUTPATIENT
Start: 2022-11-14 | End: 2022-11-15

## 2022-11-14 RX ORDER — PIPERACILLIN AND TAZOBACTAM 4; .5 G/20ML; G/20ML
3.38 INJECTION, POWDER, LYOPHILIZED, FOR SOLUTION INTRAVENOUS EVERY 8 HOURS
Refills: 0 | Status: DISCONTINUED | OUTPATIENT
Start: 2022-11-14 | End: 2022-11-17

## 2022-11-14 RX ORDER — PROPOFOL 10 MG/ML
5 INJECTION, EMULSION INTRAVENOUS
Qty: 500 | Refills: 0 | Status: DISCONTINUED | OUTPATIENT
Start: 2022-11-14 | End: 2022-11-14

## 2022-11-14 RX ORDER — MIDAZOLAM HYDROCHLORIDE 1 MG/ML
4 INJECTION, SOLUTION INTRAMUSCULAR; INTRAVENOUS EVERY 4 HOURS
Refills: 0 | Status: DISCONTINUED | OUTPATIENT
Start: 2022-11-14 | End: 2022-11-15

## 2022-11-14 RX ORDER — PANTOPRAZOLE SODIUM 20 MG/1
40 TABLET, DELAYED RELEASE ORAL DAILY
Refills: 0 | Status: DISCONTINUED | OUTPATIENT
Start: 2022-11-14 | End: 2022-11-18

## 2022-11-14 RX ORDER — LEVETIRACETAM 250 MG/1
1000 TABLET, FILM COATED ORAL ONCE
Refills: 0 | Status: COMPLETED | OUTPATIENT
Start: 2022-11-14 | End: 2022-11-14

## 2022-11-14 RX ADMIN — HEPARIN SODIUM 5000 UNIT(S): 5000 INJECTION INTRAVENOUS; SUBCUTANEOUS at 21:05

## 2022-11-14 RX ADMIN — CHLORHEXIDINE GLUCONATE 1 APPLICATION(S): 213 SOLUTION TOPICAL at 06:54

## 2022-11-14 RX ADMIN — PROPOFOL 2.59 MICROGRAM(S)/KG/MIN: 10 INJECTION, EMULSION INTRAVENOUS at 20:16

## 2022-11-14 RX ADMIN — Medication 8.08 MICROGRAM(S)/KG/MIN: at 17:27

## 2022-11-14 RX ADMIN — CHLORHEXIDINE GLUCONATE 15 MILLILITER(S): 213 SOLUTION TOPICAL at 17:28

## 2022-11-14 RX ADMIN — Medication 81 MILLIGRAM(S): at 21:23

## 2022-11-14 RX ADMIN — PIPERACILLIN AND TAZOBACTAM 25 GRAM(S): 4; .5 INJECTION, POWDER, LYOPHILIZED, FOR SOLUTION INTRAVENOUS at 13:45

## 2022-11-14 RX ADMIN — PROPOFOL 2.59 MICROGRAM(S)/KG/MIN: 10 INJECTION, EMULSION INTRAVENOUS at 16:31

## 2022-11-14 RX ADMIN — Medication 2 MILLIGRAM(S): at 06:53

## 2022-11-14 RX ADMIN — Medication 324 MILLIGRAM(S): at 16:31

## 2022-11-14 RX ADMIN — PIPERACILLIN AND TAZOBACTAM 25 GRAM(S): 4; .5 INJECTION, POWDER, LYOPHILIZED, FOR SOLUTION INTRAVENOUS at 21:05

## 2022-11-14 RX ADMIN — Medication 1000 MILLIGRAM(S): at 00:55

## 2022-11-14 RX ADMIN — MIDAZOLAM HYDROCHLORIDE 4 MILLIGRAM(S): 1 INJECTION, SOLUTION INTRAMUSCULAR; INTRAVENOUS at 16:53

## 2022-11-14 RX ADMIN — LEVETIRACETAM 400 MILLIGRAM(S): 250 TABLET, FILM COATED ORAL at 07:58

## 2022-11-14 RX ADMIN — Medication 8.08 MICROGRAM(S)/KG/MIN: at 04:12

## 2022-11-14 RX ADMIN — MIDAZOLAM HYDROCHLORIDE 4 MILLIGRAM(S): 1 INJECTION, SOLUTION INTRAMUSCULAR; INTRAVENOUS at 20:00

## 2022-11-14 RX ADMIN — PIPERACILLIN AND TAZOBACTAM 25 GRAM(S): 4; .5 INJECTION, POWDER, LYOPHILIZED, FOR SOLUTION INTRAVENOUS at 03:37

## 2022-11-14 RX ADMIN — LEVETIRACETAM 420 MILLIGRAM(S): 250 TABLET, FILM COATED ORAL at 17:27

## 2022-11-14 RX ADMIN — FENTANYL CITRATE 4.31 MICROGRAM(S)/KG/HR: 50 INJECTION INTRAVENOUS at 03:19

## 2022-11-14 RX ADMIN — CHLORHEXIDINE GLUCONATE 15 MILLILITER(S): 213 SOLUTION TOPICAL at 06:53

## 2022-11-14 RX ADMIN — HEPARIN SODIUM 5000 UNIT(S): 5000 INJECTION INTRAVENOUS; SUBCUTANEOUS at 13:46

## 2022-11-14 RX ADMIN — HEPARIN SODIUM 5000 UNIT(S): 5000 INJECTION INTRAVENOUS; SUBCUTANEOUS at 06:53

## 2022-11-14 RX ADMIN — PROPOFOL 2.59 MICROGRAM(S)/KG/MIN: 10 INJECTION, EMULSION INTRAVENOUS at 10:21

## 2022-11-14 RX ADMIN — PROPOFOL 2.59 MICROGRAM(S)/KG/MIN: 10 INJECTION, EMULSION INTRAVENOUS at 03:50

## 2022-11-14 RX ADMIN — Medication 62.5 MILLIMOLE(S): at 15:42

## 2022-11-14 RX ADMIN — PANTOPRAZOLE SODIUM 40 MILLIGRAM(S): 20 TABLET, DELAYED RELEASE ORAL at 12:23

## 2022-11-14 NOTE — H&P ADULT - NSHPLABSRESULTS_GEN_ALL_CORE
CBC Full  -  ( 2022 22:45 )  WBC Count : 12.14 K/uL  RBC Count : 5.02 M/uL  Hemoglobin : 15.1 g/dL  Hematocrit : 47.7 %  Platelet Count - Automated : 205 K/uL  Mean Cell Volume : 95.0 fl  Mean Cell Hemoglobin : 30.1 pg  Mean Cell Hemoglobin Concentration : 31.7 g/dL  Auto Neutrophil # : 6.68 K/uL  Auto Lymphocyte # : 3.81 K/uL  Auto Monocyte # : 0.90 K/uL  Auto Eosinophil # : 0.64 K/uL  Auto Basophil # : 0.08 K/uL  Auto Neutrophil % : 55.0 %  Auto Lymphocyte % : 31.4 %  Auto Monocyte % : 7.4 %  Auto Eosinophil % : 5.3 %  Auto Basophil % : 0.7 %        134<L>  |  104  |  23  ----------------------------<  46<LL>  4.8   |  21<L>  |  1.34<H>    Ca    8.5      2022 22:45    TPro  8.0  /  Alb  3.9  /  TBili  0.7  /  DBili  x   /  AST  30  /  ALT  26  /  AlkPhos  66  11-13    LIVER FUNCTIONS - ( 2022 22:45 )  Alb: 3.9 g/dL / Pro: 8.0 gm/dL / ALK PHOS: 66 U/L / ALT: 26 U/L / AST: 30 U/L / GGT: x           CAPILLARY BLOOD GLUCOSE  POCT Blood Glucose.: 221 mg/dL (2022 00:07)  POCT Blood Glucose.: 349 mg/dL (2022 22:27)  POCT Blood Glucose.: 70 mg/dL (2022 22:15)    PT/INR - ( 2022 22:45 )   PT: 10.7 sec;   INR: 0.89 ratio    PTT - ( 2022 22:45 )  PTT:31.7 sec    Urinalysis Basic - ( 2022 23:19 )  Color: Yellow / Appearance: Clear / S.010 / pH: x  Gluc: x / Ketone: Negative  / Bili: Negative / Urobili: Negative mg/dL   Blood: x / Protein: 100 mg/dL / Nitrite: Negative   Leuk Esterase: Negative / RBC: 3-5 /HPF / WBC Negative   Sq Epi: x / Non Sq Epi: Occasional / Bacteria: Few    ABG - ( 2022 23:04 )  pH, Arterial: 7.06  pH, Blood: x     /  pCO2: 60    /  pO2: 148   / HCO3: 17    / Base Excess: -13.8 /  SaO2: 98.8        Lactate, Blood: 5.3 mmol/L (. @ 22:45)    Troponin I, High Sensitivity Result: 17.7      RADIOLOGY    CT Head No Cont (. @ 00:46)   IMPRESSION:  No large territory acute infarct, intracranial hemorrhage, or mass effect.  Chronic infarcts in the left occipital and right frontal lobes,   superimposed upon severe chronic microangiopathic white matter changes.  Uniformly hyperdense intracranial vasculature, which may be secondary to   hemoconcentration and/or volume averaging with calcified atherosclerotic   change. MRI/MRA could be considered for further evaluation as clinically   warranted.  Partially visualized endotracheal tube and nasoenteric catheter, with   coiling of the nasoenteric catheter in the posterior nasopharynx.      PENDING CT CHEST PE STUDY AND ABD/PELVIS CBC Full  -  ( 2022 22:45 )  WBC Count : 12.14 K/uL  RBC Count : 5.02 M/uL  Hemoglobin : 15.1 g/dL  Hematocrit : 47.7 %  Platelet Count - Automated : 205 K/uL  Mean Cell Volume : 95.0 fl  Mean Cell Hemoglobin : 30.1 pg  Mean Cell Hemoglobin Concentration : 31.7 g/dL  Auto Neutrophil # : 6.68 K/uL  Auto Lymphocyte # : 3.81 K/uL  Auto Monocyte # : 0.90 K/uL  Auto Eosinophil # : 0.64 K/uL  Auto Basophil # : 0.08 K/uL  Auto Neutrophil % : 55.0 %  Auto Lymphocyte % : 31.4 %  Auto Monocyte % : 7.4 %  Auto Eosinophil % : 5.3 %  Auto Basophil % : 0.7 %        134<L>  |  104  |  23  ----------------------------<  46<LL>  4.8   |  21<L>  |  1.34<H>    Ca    8.5      2022 22:45    TPro  8.0  /  Alb  3.9  /  TBili  0.7  /  DBili  x   /  AST  30  /  ALT  26  /  AlkPhos  66  11-13    LIVER FUNCTIONS - ( 2022 22:45 )  Alb: 3.9 g/dL / Pro: 8.0 gm/dL / ALK PHOS: 66 U/L / ALT: 26 U/L / AST: 30 U/L / GGT: x           CAPILLARY BLOOD GLUCOSE  POCT Blood Glucose.: 221 mg/dL (2022 00:07)  POCT Blood Glucose.: 349 mg/dL (2022 22:27)  POCT Blood Glucose.: 70 mg/dL (2022 22:15)    PT/INR - ( 2022 22:45 )   PT: 10.7 sec;   INR: 0.89 ratio    PTT - ( 2022 22:45 )  PTT:31.7 sec    Urinalysis Basic - ( 2022 23:19 )  Color: Yellow / Appearance: Clear / S.010 / pH: x  Gluc: x / Ketone: Negative  / Bili: Negative / Urobili: Negative mg/dL   Blood: x / Protein: 100 mg/dL / Nitrite: Negative   Leuk Esterase: Negative / RBC: 3-5 /HPF / WBC Negative   Sq Epi: x / Non Sq Epi: Occasional / Bacteria: Few    ABG - ( 2022 23:04 )  pH, Arterial: 7.06  pH, Blood: x     /  pCO2: 60    /  pO2: 148   / HCO3: 17    / Base Excess: -13.8 /  SaO2: 98.8        Lactate, Blood: 5.3 mmol/L (22 @ 22:45)    Troponin I, High Sensitivity Result: 17.7      RADIOLOGY    CT Head No Cont (22 @ 00:46)   IMPRESSION:  No large territory acute infarct, intracranial hemorrhage, or mass effect.  Chronic infarcts in the left occipital and right frontal lobes,   superimposed upon severe chronic microangiopathic white matter changes.  Uniformly hyperdense intracranial vasculature, which may be secondary to   hemoconcentration and/or volume averaging with calcified atherosclerotic   change. MRI/MRA could be considered for further evaluation as clinically   warranted.  Partially visualized endotracheal tube and nasoenteric catheter, with   coiling of the nasoenteric catheter in the posterior nasopharynx.      CT Angio Chest PE Protocol w/ IV Cont (22 @ 01:00)   IMPRESSION:  1. Acute fractures of the lateral right fourth through sixth ribs.  2. Smallleft lower lobe nodules, measuring up to 1 cm. 6-12 month CT   chest follow-up advised.

## 2022-11-14 NOTE — H&P ADULT - HISTORY OF PRESENT ILLNESS
Mr. aHmilton is a 72 year old male with a PMH of lacunar infarct, CHF (EF ~40% per son), CAD s/p PCI with stent and CABGx3 (~2020), s/p PPM (medtronic), HTN, COPD, DMII on insulin and PO meds, BPH, and CKD (stage II?) presenting to ICU s/p cardiac arrest in ED. Per patient son he has recently been doing well and in his normal state of health, however, yesterday Mr. Hamilton started c/o low blood sugars on glucometer. Pt reportedly continued to take his home medications, including both oral antidiabetic agents and insulin (both long and short acting). Today his son found him minimally responsive hanging off his bed, checked his glucose and it read "low," so he called 911. In ED pt was noted to be hypothermic with glucose noted to be 70 just prior to PEA arrest. Pt was coded for ~5 minutes with 3 epi given with ROSC. Pt intubated at that time. is moving all extremities equally and with reactive, equal pupils. He remained hypothermic and is not requiring vasopressor support. Pending CT Scans. ROS otherwise negative per son besides hypoglycemia, with no known sick contacts. Pt accepted to ICU for further management / care.  Mr. Hamilton is a 72 year old male with a PMH of lacunar infarct, CHF (EF ~40% per son), CAD s/p PCI with stent and CABGx3 (~2020), s/p PPM (medtronic), HTN, COPD, DMII on insulin and PO meds, BPH, and CKD (stage II?) presenting to ICU s/p cardiac arrest in ED. Per patient son he has recently been doing well and in his normal state of health, however, yesterday Mr. Hamilton started c/o low blood sugars on glucometer. Pt reportedly continued to take his home medications, including both oral antidiabetic agents and insulin (both long and short acting). Today his son found him minimally responsive hanging off his bed, checked his glucose and it read "low," so he called 911. In ED pt was noted to be hypothermic with glucose noted to be 70 just prior to PEA arrest. Pt was coded for ~5 minutes with 2 epi given with ROSC. Pt intubated at that time. is moving all extremities equally and with reactive, equal pupils. He remained hypothermic and is not requiring vasopressor support. Pending CT Scans. ROS otherwise negative per son besides hypoglycemia, with no known sick contacts. Pt accepted to ICU for further management / care.

## 2022-11-14 NOTE — ED ADULT NURSE REASSESSMENT NOTE - NS ED NURSE REASSESS COMMENT FT1
pt transported via stretcher to ICU without incident. Handoff given to JULIUS Hearn. No acute distress noted.

## 2022-11-14 NOTE — H&P ADULT - ASSESSMENT
72 year old male with a PMH of lacunar infarct, CHF (EF ~40% per son), CAD s/p PCI with stent and CABGx3 (~2020), s/p PPM (medtronic), HTN, COPD, DMII on insulin and PO meds, BPH, and CKD (stage II?) admitted to ICU for post cardiac arrest management. Pt remained intubated, hypothermic, not on any vasopressor or inotropic support at this time. ELMER on CKD noted on laps. Hypoglycemic event proceeding arrest, likely iatrogenic/medication related. Pending further CT scan workup. Plan as below:     72 year old male with a PMH of lacunar infarct, CHF (EF ~40% per son), CAD s/p PCI with stent and CABGx3 (~2020), s/p PPM (medtronic), HTN, COPD, DMII on insulin and PO meds, BPH, and CKD (stage II?) admitted to ICU for post cardiac arrest management (ROSC achieved ~5 mins). Pt remained intubated, hypothermic, not on any vasopressor or inotropic support at this time. ELMER on CKD noted on labs. Hypoglycemic event proceeding arrest, likely iatrogenic/medication related in cardiac patient with extensive history. Pt noted to have right rib fx (later 4-6; likely 2/2 CPR).  Plan as below:    Issues:  1. cardiac arrest  2. hypoglycemia  3. hypothermia  4. respiratory failure, hypoxemic / hypercarbic  5. r/o sepsis, no obvious source  6. elmer on ckd  7. heart failure, s/p cabg, ppm  8. copd  9. diabetes  10. rib rx, right lateral 4-6 acute    Plan:  --admit to icu, monitor vitals, o2 sat, neuro status, tele rhythm  --maintain ett, abg prn, inc rate on vent, daily sbt; if pt awake and protecting airway may be able to wean later today; nebs prn  --sedation goal rass 0/-1, sat  --pt currently not on vasopressor / inotropic support, maintain MAP >65 mmHg, low threshold to start pressor gtt as needed  --s/p cultures in ed, continue empiric abx coverage; pt remains hypothermic post arrest, will allow pt to self correct temp will not warm given post arrest; fup CT scans  --trend labs, lactate, cardiac markers, cmp, cbc; also sending procal and bnp  --hypolyglycemic workup, cortisol, c-peptide, insulin, proinsulin, hgba1c, tsh/free t4; holding all po antidiabetic agents and insulin for now; hourly finger sticks, may need dextrose gtt if persistent hypoglycemia noted; endocrine consult  --maintain npo status for now, ngt need to be replaced per recent imaging   --monitor bun/creatinine, avoid nephrotoxic agents, monitor u/o, strict i/o's  --tte, cardiology consult; medtronic ppm, may interrogate  --sqh for dvt prophylaxis  --GI prophylaxis  --pain control prn for rib fx / intubated post arrest related discomfort  --GOC  --family education / support   72 year old male with a PMH of lacunar infarct, CHF (EF ~40% per son), CAD s/p PCI with stent and CABGx3 (~2020), s/p PPM (medtronic), HTN, COPD, DMII on insulin and PO meds, BPH, and CKD (stage II?) admitted to ICU for post cardiac arrest management (ROSC achieved ~5 mins). Pt remained intubated, hypothermic, not on any vasopressor or inotropic support at this time. ELMER on CKD noted on labs. Hypoglycemic event proceeding arrest, likely iatrogenic/medication related in cardiac patient with extensive history. Pt noted to have right rib fx (later 4-6; likely 2/2 CPR).  Plan as below:    Issues:  1. cardiac arrest  2. hypoglycemia  3. hypothermia  4. respiratory failure, hypoxemic / hypercarbic  5. r/o sepsis, no obvious source  6. elmer on ckd  7. heart failure, s/p cabg, ppm  8. copd  9. diabetes  10. rib rx, right lateral 4-6 acute    Plan:  --admit to icu, monitor vitals, o2 sat, neuro status, tele rhythm  --maintain ett, abg prn, inc rate on vent, daily sbt; if pt awake and protecting airway may be able to wean later today; nebs prn; (fup lung nodules outpatient basis when stable)  --sedation goal rass 0/-1, sat  --pt currently not on vasopressor / inotropic support, maintain MAP >65 mmHg, low threshold to start pressor gtt as needed  --s/p cultures in ed, continue empiric abx coverage; pt remains hypothermic post arrest, will allow pt to self correct temp will not warm given post arrest; fup CT scans  --trend labs, lactate, cardiac markers, cmp, cbc; also sending procal and bnp  --hypolyglycemic workup, cortisol, c-peptide, insulin, proinsulin, hgba1c, tsh/free t4; holding all po antidiabetic agents and insulin for now; hourly finger sticks, may need dextrose gtt if persistent hypoglycemia noted; endocrine consult  --maintain npo status for now, ngt need to be replaced per recent imaging   --monitor bun/creatinine, avoid nephrotoxic agents, monitor u/o, strict i/o's  --tte, cardiology consult; medtronic ppm, may interrogate  --[facility] for dvt prophylaxis + SCD's  --GI prophylaxis  --pain control prn for rib fx / intubated post arrest related discomfort  --GOC  --family education / support

## 2022-11-14 NOTE — H&P ADULT - CRITICAL CARE ATTENDING COMMENT
72M PMH HTN, CAD s/p PCI with stent 2015 and CABG 2014, chronic diastolic heart failure (EF 50%), 2nd degree AV block s/p Medtronic PPM, CVA (lacunar infarct), COPD, DMII on insulin and PO meds, BPH, and CKD 3 brought in by EMS after son returned home from work to find patient unresponsive with noted blood in mouth and sonorous breathing. Unknown how long pt unresponsive for at home. Blood sugar in the 40s with EMS s/p glucagon. On arrival to ED pt hypothermic and agonally breathing. Became unresponsive and with loss of pulse. Chest compressions started. ACLS initiated. PEA arrest with ROSC after approximately 5 min s/p epi x2.  Pt intubated during CODE BLUE. Per son pt on the day prior to presentation reported low blood sugar reads on his glucometer in the range of 80s. Son believes pt continued to take his insulin and oral diabetic regimen. Son states that pt was eating but may have been eating less in the last few days. Admit to ICU post arrest. Course with clonic tonic seizures. Hypotension/Shock state requiring levophed initiation.     POCUS:  - decreased to low normal LV function  - a line predominance with some focal scattered B lines, no significant pleural effusions    DX: cardiac arrest, respiratory arrest requiring intubation, acute hypercarbic respiratory failure, hypoglycemia, tonic clonic seizures post arrest, circulatory shock, acute on chronic renal failure with ATN, acute metabolic encephalopathy, rib fractures from compressions    NEURO  unresponsive post arrest  noted to have initial tremors in L foot and flickering of eye lids  then proceeded to have witnessed clonic tonic seizure this AM  given versed 4mg IVP x1  had another seizure this evening  keppra loaded  start keppra 500mg q12 for maintenance  will get EEG  cont propofol  attempted to wean down sedation however pt developed another seizure on lowered sedation  CT brain without acute pathology  etiology of new onset seizures ?due to arrest/hypoxia, possible prolonged hypoglycemic state, prior stroke  neurology consult    CV  hypotensive started on levophed for BP support  maintain MAP > 65  follow up official echo  cont asa for CAD  interrogate PPM    PULM  remains on mechanical ventilation  unable to wean as pt does not tolerate spontaneous awakening trials due to seizures  check sputum cx  had oral bleeding in mouth, possibly had tongue bite with seizures  no active bleeding noted    ENDO  hypoglycemia   observing off diabetic meds and insulin  monitor serial blood sugar    GI  pt with emesis: bilious  NGT to suction  hold on starting tube feeds    RENAL  acute on chronic renal failure in setting of arrest and hypoperfusion  supportive care  making urine  monitor Cr and output    ID  with hypothermia, hypoglycemia, hypotension  will do an infectious work up  check blood cx, u cx, sputum cx  blood cx negative to date  will place on empiric zosyn covering possible aspiration   CT chest with noted secretions in airway    MUSCULOSKELETAL  rib fracture likely secondary to CPR performed  analgesics:  on fentanyl drip for vent synchrony but will also treat any underlying pain  supportive care    GEN  GOC discussion with son at bedside today  prognosis guarded, pt critically ill  seizures post arrest is not a good prognostic factor for neurologic recovery  remains full code at present time  DVT ppx: heparin

## 2022-11-14 NOTE — H&P ADULT - NSHPPHYSICALEXAM_GEN_ALL_CORE
T(C): 33.9 (11-13-22 @ 23:54), Max: 33.9 (11-13-22 @ 23:54)  HR: 90 (11-14-22 @ 00:57) (56 - 97)  BP: 175/88 (11-14-22 @ 00:57) (120/66 - 175/88)  RR: 24 (11-14-22 @ 00:57) (24 - 24)  SpO2: 100% (11-14-22 @ 00:57) (94% - 100%)    CONSTITUTIONAL: Appears stated age, intubated  EYES: PERRLA and symmetric, EOMI; excessive lacrimation  ENMT: moist membranes, ett in place  RESP: Mechanical bs noted b/l, coarse to ascultation, no use of accessory muscles  CV: s1/s2, mild peripheral edema noted  GI: Soft, NT, ND, no rebound, no guarding  MSK: GROVES x4, normal ROM noted  SKIN: No rashes or ulcers noted  NEURO: NOEL pt intubated, mildly responsive  PSYCH: NOEL pt intubated, mildly responsive

## 2022-11-15 LAB
ALBUMIN SERPL ELPH-MCNC: 3.1 G/DL — LOW (ref 3.3–5)
ALP SERPL-CCNC: 55 U/L — SIGNIFICANT CHANGE UP (ref 40–120)
ALT FLD-CCNC: 156 U/L — HIGH (ref 12–78)
ANION GAP SERPL CALC-SCNC: 11 MMOL/L — SIGNIFICANT CHANGE UP (ref 5–17)
AST SERPL-CCNC: 73 U/L — HIGH (ref 15–37)
BASOPHILS # BLD AUTO: 0.04 K/UL — SIGNIFICANT CHANGE UP (ref 0–0.2)
BASOPHILS NFR BLD AUTO: 0.4 % — SIGNIFICANT CHANGE UP (ref 0–2)
BILIRUB SERPL-MCNC: 0.6 MG/DL — SIGNIFICANT CHANGE UP (ref 0.2–1.2)
BUN SERPL-MCNC: 28 MG/DL — HIGH (ref 7–23)
CALCIUM SERPL-MCNC: 8.5 MG/DL — SIGNIFICANT CHANGE UP (ref 8.5–10.1)
CHLORIDE SERPL-SCNC: 103 MMOL/L — SIGNIFICANT CHANGE UP (ref 96–108)
CO2 SERPL-SCNC: 21 MMOL/L — LOW (ref 22–31)
CORTIS AM PEAK SERPL-MCNC: 8.1 UG/DL — SIGNIFICANT CHANGE UP (ref 6–18.4)
CREAT SERPL-MCNC: 1.94 MG/DL — HIGH (ref 0.5–1.3)
CULTURE RESULTS: NO GROWTH — SIGNIFICANT CHANGE UP
EGFR: 36 ML/MIN/1.73M2 — LOW
EOSINOPHIL # BLD AUTO: 0.2 K/UL — SIGNIFICANT CHANGE UP (ref 0–0.5)
EOSINOPHIL NFR BLD AUTO: 2 % — SIGNIFICANT CHANGE UP (ref 0–6)
GLUCOSE BLDC GLUCOMTR-MCNC: 171 MG/DL — HIGH (ref 70–99)
GLUCOSE BLDC GLUCOMTR-MCNC: 174 MG/DL — HIGH (ref 70–99)
GLUCOSE BLDC GLUCOMTR-MCNC: 194 MG/DL — HIGH (ref 70–99)
GLUCOSE BLDC GLUCOMTR-MCNC: 205 MG/DL — HIGH (ref 70–99)
GLUCOSE BLDC GLUCOMTR-MCNC: 211 MG/DL — HIGH (ref 70–99)
GLUCOSE SERPL-MCNC: 214 MG/DL — HIGH (ref 70–99)
HCT VFR BLD CALC: 39.7 % — SIGNIFICANT CHANGE UP (ref 39–50)
HGB BLD-MCNC: 13.5 G/DL — SIGNIFICANT CHANGE UP (ref 13–17)
IMM GRANULOCYTES NFR BLD AUTO: 0.2 % — SIGNIFICANT CHANGE UP (ref 0–0.9)
LYMPHOCYTES # BLD AUTO: 1.77 K/UL — SIGNIFICANT CHANGE UP (ref 1–3.3)
LYMPHOCYTES # BLD AUTO: 17.6 % — SIGNIFICANT CHANGE UP (ref 13–44)
MAGNESIUM SERPL-MCNC: 2 MG/DL — SIGNIFICANT CHANGE UP (ref 1.6–2.6)
MCHC RBC-ENTMCNC: 30.7 PG — SIGNIFICANT CHANGE UP (ref 27–34)
MCHC RBC-ENTMCNC: 34 G/DL — SIGNIFICANT CHANGE UP (ref 32–36)
MCV RBC AUTO: 90.2 FL — SIGNIFICANT CHANGE UP (ref 80–100)
MONOCYTES # BLD AUTO: 1.15 K/UL — HIGH (ref 0–0.9)
MONOCYTES NFR BLD AUTO: 11.4 % — SIGNIFICANT CHANGE UP (ref 2–14)
NEUTROPHILS # BLD AUTO: 6.87 K/UL — SIGNIFICANT CHANGE UP (ref 1.8–7.4)
NEUTROPHILS NFR BLD AUTO: 68.4 % — SIGNIFICANT CHANGE UP (ref 43–77)
NRBC # BLD: 0 /100 WBCS — SIGNIFICANT CHANGE UP (ref 0–0)
PHOSPHATE SERPL-MCNC: 4.2 MG/DL — SIGNIFICANT CHANGE UP (ref 2.5–4.5)
PLATELET # BLD AUTO: 182 K/UL — SIGNIFICANT CHANGE UP (ref 150–400)
POTASSIUM SERPL-MCNC: 4.1 MMOL/L — SIGNIFICANT CHANGE UP (ref 3.5–5.3)
POTASSIUM SERPL-SCNC: 4.1 MMOL/L — SIGNIFICANT CHANGE UP (ref 3.5–5.3)
PROT SERPL-MCNC: 6.3 GM/DL — SIGNIFICANT CHANGE UP (ref 6–8.3)
RBC # BLD: 4.4 M/UL — SIGNIFICANT CHANGE UP (ref 4.2–5.8)
RBC # FLD: 13.5 % — SIGNIFICANT CHANGE UP (ref 10.3–14.5)
SODIUM SERPL-SCNC: 135 MMOL/L — SIGNIFICANT CHANGE UP (ref 135–145)
SPECIMEN SOURCE: SIGNIFICANT CHANGE UP
WBC # BLD: 10.05 K/UL — SIGNIFICANT CHANGE UP (ref 3.8–10.5)
WBC # FLD AUTO: 10.05 K/UL — SIGNIFICANT CHANGE UP (ref 3.8–10.5)

## 2022-11-15 PROCEDURE — 95816 EEG AWAKE AND DROWSY: CPT | Mod: 26

## 2022-11-15 PROCEDURE — 99292 CRITICAL CARE ADDL 30 MIN: CPT | Mod: GC

## 2022-11-15 PROCEDURE — 70450 CT HEAD/BRAIN W/O DYE: CPT | Mod: 26

## 2022-11-15 PROCEDURE — 99291 CRITICAL CARE FIRST HOUR: CPT | Mod: GC

## 2022-11-15 RX ORDER — FENTANYL CITRATE 50 UG/ML
0.5 INJECTION INTRAVENOUS
Qty: 2500 | Refills: 0 | Status: DISCONTINUED | OUTPATIENT
Start: 2022-11-15 | End: 2022-11-17

## 2022-11-15 RX ORDER — PROPOFOL 10 MG/ML
10 INJECTION, EMULSION INTRAVENOUS
Qty: 1000 | Refills: 0 | Status: DISCONTINUED | OUTPATIENT
Start: 2022-11-15 | End: 2022-11-18

## 2022-11-15 RX ORDER — LEVETIRACETAM 250 MG/1
750 TABLET, FILM COATED ORAL EVERY 12 HOURS
Refills: 0 | Status: DISCONTINUED | OUTPATIENT
Start: 2022-11-15 | End: 2022-11-15

## 2022-11-15 RX ORDER — PROPOFOL 10 MG/ML
5.01 INJECTION, EMULSION INTRAVENOUS
Qty: 1000 | Refills: 0 | Status: DISCONTINUED | OUTPATIENT
Start: 2022-11-15 | End: 2022-11-15

## 2022-11-15 RX ORDER — ACETAMINOPHEN 500 MG
650 TABLET ORAL ONCE
Refills: 0 | Status: COMPLETED | OUTPATIENT
Start: 2022-11-15 | End: 2022-11-15

## 2022-11-15 RX ORDER — LEVETIRACETAM 250 MG/1
1000 TABLET, FILM COATED ORAL EVERY 12 HOURS
Refills: 0 | Status: DISCONTINUED | OUTPATIENT
Start: 2022-11-15 | End: 2022-11-16

## 2022-11-15 RX ORDER — MIDAZOLAM HYDROCHLORIDE 1 MG/ML
2 INJECTION, SOLUTION INTRAMUSCULAR; INTRAVENOUS
Refills: 0 | Status: DISCONTINUED | OUTPATIENT
Start: 2022-11-15 | End: 2022-11-20

## 2022-11-15 RX ADMIN — CHLORHEXIDINE GLUCONATE 15 MILLILITER(S): 213 SOLUTION TOPICAL at 05:28

## 2022-11-15 RX ADMIN — PROPOFOL 2.59 MICROGRAM(S)/KG/MIN: 10 INJECTION, EMULSION INTRAVENOUS at 15:53

## 2022-11-15 RX ADMIN — LEVETIRACETAM 420 MILLIGRAM(S): 250 TABLET, FILM COATED ORAL at 05:38

## 2022-11-15 RX ADMIN — Medication 8.08 MICROGRAM(S)/KG/MIN: at 10:44

## 2022-11-15 RX ADMIN — PROPOFOL 2.59 MICROGRAM(S)/KG/MIN: 10 INJECTION, EMULSION INTRAVENOUS at 06:20

## 2022-11-15 RX ADMIN — PROPOFOL 2.59 MICROGRAM(S)/KG/MIN: 10 INJECTION, EMULSION INTRAVENOUS at 08:34

## 2022-11-15 RX ADMIN — HEPARIN SODIUM 5000 UNIT(S): 5000 INJECTION INTRAVENOUS; SUBCUTANEOUS at 05:28

## 2022-11-15 RX ADMIN — Medication 8.08 MICROGRAM(S)/KG/MIN: at 08:34

## 2022-11-15 RX ADMIN — Medication 650 MILLIGRAM(S): at 22:47

## 2022-11-15 RX ADMIN — PIPERACILLIN AND TAZOBACTAM 25 GRAM(S): 4; .5 INJECTION, POWDER, LYOPHILIZED, FOR SOLUTION INTRAVENOUS at 05:28

## 2022-11-15 RX ADMIN — LEVETIRACETAM 400 MILLIGRAM(S): 250 TABLET, FILM COATED ORAL at 18:26

## 2022-11-15 RX ADMIN — FENTANYL CITRATE 4.31 MICROGRAM(S)/KG/HR: 50 INJECTION INTRAVENOUS at 22:16

## 2022-11-15 RX ADMIN — Medication 650 MILLIGRAM(S): at 23:33

## 2022-11-15 RX ADMIN — MIDAZOLAM HYDROCHLORIDE 2 MILLIGRAM(S): 1 INJECTION, SOLUTION INTRAMUSCULAR; INTRAVENOUS at 05:56

## 2022-11-15 RX ADMIN — PROPOFOL 2.59 MICROGRAM(S)/KG/MIN: 10 INJECTION, EMULSION INTRAVENOUS at 18:34

## 2022-11-15 RX ADMIN — PANTOPRAZOLE SODIUM 40 MILLIGRAM(S): 20 TABLET, DELAYED RELEASE ORAL at 12:37

## 2022-11-15 RX ADMIN — PROPOFOL 2.59 MICROGRAM(S)/KG/MIN: 10 INJECTION, EMULSION INTRAVENOUS at 10:44

## 2022-11-15 RX ADMIN — MIDAZOLAM HYDROCHLORIDE 2 MILLIGRAM(S): 1 INJECTION, SOLUTION INTRAMUSCULAR; INTRAVENOUS at 08:29

## 2022-11-15 RX ADMIN — PIPERACILLIN AND TAZOBACTAM 25 GRAM(S): 4; .5 INJECTION, POWDER, LYOPHILIZED, FOR SOLUTION INTRAVENOUS at 22:16

## 2022-11-15 RX ADMIN — CHLORHEXIDINE GLUCONATE 1 APPLICATION(S): 213 SOLUTION TOPICAL at 12:37

## 2022-11-15 RX ADMIN — PIPERACILLIN AND TAZOBACTAM 25 GRAM(S): 4; .5 INJECTION, POWDER, LYOPHILIZED, FOR SOLUTION INTRAVENOUS at 14:57

## 2022-11-15 RX ADMIN — FENTANYL CITRATE 4.31 MICROGRAM(S)/KG/HR: 50 INJECTION INTRAVENOUS at 08:34

## 2022-11-15 RX ADMIN — PROPOFOL 5.17 MICROGRAM(S)/KG/MIN: 10 INJECTION, EMULSION INTRAVENOUS at 22:16

## 2022-11-15 RX ADMIN — HEPARIN SODIUM 5000 UNIT(S): 5000 INJECTION INTRAVENOUS; SUBCUTANEOUS at 14:58

## 2022-11-15 RX ADMIN — FENTANYL CITRATE 4.31 MICROGRAM(S)/KG/HR: 50 INJECTION INTRAVENOUS at 06:20

## 2022-11-15 RX ADMIN — HEPARIN SODIUM 5000 UNIT(S): 5000 INJECTION INTRAVENOUS; SUBCUTANEOUS at 22:17

## 2022-11-15 RX ADMIN — CHLORHEXIDINE GLUCONATE 15 MILLILITER(S): 213 SOLUTION TOPICAL at 18:26

## 2022-11-15 RX ADMIN — Medication 81 MILLIGRAM(S): at 12:36

## 2022-11-15 RX ADMIN — PROPOFOL 2.59 MICROGRAM(S)/KG/MIN: 10 INJECTION, EMULSION INTRAVENOUS at 03:31

## 2022-11-15 RX ADMIN — MIDAZOLAM HYDROCHLORIDE 4 MILLIGRAM(S): 1 INJECTION, SOLUTION INTRAMUSCULAR; INTRAVENOUS at 01:16

## 2022-11-15 NOTE — PROGRESS NOTE ADULT - CONVERSATION DETAILS
pt s/p cardiac arrest with multiple seizure like activity. Today again with generalized tonic clonic seizure.    Unable to come down on sedation as pt starts to seize and becomes discoordinate with vent with respiratory distress and abdominal breathing with accessory muscle use.     prognosis is poor and this was discussed with son.     at present time pt has not been able to tolerate any weaning as he is not tolerating sedation vacation.     discussed options of comfort measures with palliative extubation if pt does not show any signs of clinical improvement vs trach/peg/vent facility     discussed with son DNR in the event he has another cardiac arrest     no decision made by son     pt remains full code at present time    son is praying and remaining hopeful that pt will make some recovery and improve in the upcoming days

## 2022-11-15 NOTE — PROGRESS NOTE ADULT - ASSESSMENT
72 year old male with a PMH of lacunar infarct, CHF (EF ~40% per son), CAD s/p PCI with stent and CABGx3 (~2020), s/p PPM (medtronic), HTN, COPD, DMII on insulin and PO meds, BPH, and CKD (stage II?) admitted to ICU for post cardiac arrest management (ROSC achieved ~5 mins). Pt remained intubated, hypothermic, not on any vasopressor or inotropic support at this time. ELMER on CKD noted on labs. Hypoglycemic event proceeding arrest, likely iatrogenic/medication related in cardiac patient with extensive history. Pt noted to have right rib fx (later 4-6; likely 2/2 CPR).  Plan as below:    Issues:  1. cardiac arrest  2. hypoglycemia  3. hypothermia  4. respiratory failure, hypoxemic / hypercarbic  5. r/o sepsis, no obvious source  6. elmer on ckd  7. heart failure, s/p cabg, ppm  8. copd  9. diabetes  10. rib rx, right lateral 4-6 acute    Plan:  Neuro: keppra, propofol for seizure and sedation; EEG; CTH after EEG  Resp: intubated, full vent support, daily SBT, nebs prn; f/u lung nodules outpatient when stable  CV: wean levo as tolerated; takotsubo CM; [facility] reached out to for ppm, placed for symptomatic LBBB, no irregularities of ppm  GI: NPO w/ NGT, PPI, LFT's downtrending  Renal: ELMER; avoid nephrotoxic agents, monitor u/o, strict i/o's  ID: zosyn, cultures NGTD  Endo: f/u hypoglycemic w/u and tsh; holding all po antidiabetic agents and insulin for now  MSK: pain control prn for rib fx  Heme: SQH for ppx  GOC: full code, family updated

## 2022-11-15 NOTE — PROGRESS NOTE ADULT - ATTENDING COMMENTS
24 hr events:  pt with 3 noted generalized clonic tonic seizure yesterday s/p versed push  was given keppra load and started on keppra 500mg q12  keppra was increased overnight to 750mg q12  this morning while connected to EEG pt with full blown generalized clonic tonic seizure  remains on propofol and fentanyl: unable to wean down sedation as pt develops clonic tonic shaking  on levophed for BP support  remains intubated  had bilious emesis    72M PMH HTN, CAD s/p PCI with stent 2015 and CABG 2014, chronic diastolic heart failure (EF 50%), 2nd degree AV block s/p medtronic PPM, CVA (lacunar infarct), COPD, DMII on insulin and PO meds, BPH, and CKD 3 brought in by EMS after son returned home from work to find patient unresponsive with noted blood in mouth and sonorous breathing. Unknown how long pt unresponsive for at home. Blood sugar in the 40s with EMS s/p glucagon. On arrival to ED pt hypothermic and agonally breathing. Became unresponsive and with loss of pulse. Chest compressions started. ACLS initiated. PEA arrest with ROSC after approximately 5 min s/p epi x2.  Pt intubated during CODE BLUE. Per son pt on the day prior to presentation reported low blood sugar reads on his glucometer in the range of 80s. Son believes pt continued to take his insulin and oral diabetic regimen. Son states that pt was eating but may have been eating less in the last few days. Admit to ICU post arrest. Course with clonic tonic seizures. Shock state requiring levophed.     DX: cardiac arrest, respiratory arrest requiring intubation, acute hypercarbic respiratory failure, hypoglycemia, tonic clonic seizures post arrest, circulatory shock, takotsubo cardiomyopathy, acute on chronic renal failure with ATN, acute metabolic encephalopathy, rib fractures from compressions    NEURO  several tonic clonic seizures noted post arrest  s/p keppra load  will increase keppra to 100mg q12  follow up EEG results  cont propofol, will increase to help suppress seizures  unable to wean down sedation as pt develops seizures  initial CT brain without acute pathology  will repeat CT head to evaluate for any changes or anoxia  etiology of new onset seizures ?due to arrest, possible prolonged hypoglycemic state, prior stroke  neurology eval    CV  remains on levophed  maintain MAP > 65  echo with mildly depressed LV function and takotsubo cardiomyopathy, hypokinetic apical segments  cont asa for CAD  PPM interrogated with reported recent a-fib/a-flutter rhythm     PULM  remains on mechanical ventilation  unable to wean as pt does not tolerate spontaneous awakening trials due to seizures  follow up sputum cx  had oral bleeding in mouth on presentation, possibly had tongue but with seizures  no bleeding noted today    ENDO  hypoglycemia resolved  observing off diabetic meds and insulin  check HbA1C  will reduce frequency of finger sticks    GI  NPO as pt with bilious emesis  if no further significant NGT output or bilious emesis will start tube feeds    RENAL  acute on chronic renal failure in setting of arrest and hypoperfusion  supportive care  making urine  monitor Cr and output    ID  infectious work up thus far negative  pt was hypothermic, hypoglycemic, hypotensive  blood cx negative to date  u cx negative to date  follow up sputum cx  on empiric zosyn covering possible aspiration   d/c antibx if infectious work up negative  CXR relatively clear    MUSCULOSKELETAL  rib fracture likely secondary to CPR performed  analgesics:  on fentanyl drip for vent synchrony but will also treat any pain  supportive care    GEN  GOC discussion today and yesterday with son  prognosis guarded  remains full code at present time  DVT ppx: heparin   POCUS with a line predominance bilaterally on lung US and mildly decreased LV function 24 hr events:  pt with 3 noted generalized clonic tonic seizure yesterday s/p versed push  was given keppra load and started on keppra 500mg q12  keppra was increased overnight to 750mg q12  this morning while connected to EEG pt with full blown generalized clonic tonic seizure  remains on propofol and fentanyl: unable to wean down sedation as pt develops clonic tonic shaking  on levophed for BP support  remains intubated  had bilious emesis    72M PMH HTN, CAD s/p PCI with stent 2015 and CABG 2014, chronic diastolic heart failure (EF 50%), 2nd degree AV block s/p medtronic PPM, CVA (lacunar infarct), COPD, DMII on insulin and PO meds, BPH, and CKD 3 brought in by EMS after son returned home from work to find patient unresponsive with noted blood in mouth and sonorous breathing. Unknown how long pt unresponsive for at home. Blood sugar in the 40s with EMS s/p glucagon. On arrival to ED pt hypothermic and agonally breathing. Became unresponsive and with loss of pulse. Chest compressions started. ACLS initiated. PEA arrest with ROSC after approximately 5 min s/p epi x2.  Pt intubated during CODE BLUE. Per son pt on the day prior to presentation reported low blood sugar reads on his glucometer in the range of 80s. Son believes pt continued to take his insulin and oral diabetic regimen. Son states that pt was eating but may have been eating less in the last few days. Admit to ICU post arrest. Course with clonic tonic seizures. Shock state requiring levophed.     DX: cardiac arrest, respiratory arrest requiring intubation, acute hypercarbic respiratory failure, hypoglycemia, tonic clonic seizures post arrest, circulatory shock, takotsubo cardiomyopathy, acute on chronic renal failure with ATN, acute metabolic encephalopathy, rib fractures from compressions    NEURO  several tonic clonic seizures noted post arrest  s/p keppra load  will increase keppra to 1000mg q12  follow up EEG results  cont propofol, will increase to help suppress seizures  unable to wean down sedation as pt develops seizures  initial CT brain without acute pathology  will repeat CT head to evaluate for any changes or anoxia  etiology of new onset seizures ?due to arrest, possible prolonged hypoglycemic state, prior stroke  neurology eval    CV  remains on levophed  maintain MAP > 65  echo with mildly depressed LV function and takotsubo cardiomyopathy, hypokinetic apical segments  cont asa for CAD  PPM interrogated with reported recent a-fib/a-flutter rhythm     PULM  remains on mechanical ventilation  unable to wean as pt does not tolerate spontaneous awakening trials due to seizures  follow up sputum cx  had oral bleeding in mouth on presentation, possibly had tongue but with seizures  no bleeding noted today    ENDO  hypoglycemia resolved  observing off diabetic meds and insulin  check HbA1C  will reduce frequency of finger sticks    GI  NPO as pt with bilious emesis  if no further significant NGT output or bilious emesis will start tube feeds    RENAL  acute on chronic renal failure in setting of arrest and hypoperfusion  supportive care  making urine  monitor Cr and output    ID  infectious work up thus far negative  pt was hypothermic, hypoglycemic, hypotensive  blood cx negative to date  u cx negative to date  follow up sputum cx  on empiric zosyn covering possible aspiration   d/c antibx if infectious work up negative  CXR relatively clear    MUSCULOSKELETAL  rib fracture likely secondary to CPR performed  analgesics:  on fentanyl drip for vent synchrony but will also treat any pain  supportive care    GEN  GOC discussion today and yesterday with son  prognosis guarded  remains full code at present time  DVT ppx: heparin   POCUS with a line predominance bilaterally on lung US and mildly decreased LV function

## 2022-11-15 NOTE — EEG REPORT - NS EEG TEXT BOX
REPORT OF ROUTINE EEG WITH VIDEO  [facility]: [address], [address] [place] [address], Phone: [phone] [facility]: [address] [place] [address], [place] [address], Phone: [phone] Office: [address] [place] [address], [place] [address], Phone: [phone]  Patient Name: BRANDON CAMARILLO   Age: 72 year : 1950 MRN #: -, Location: ICU3 Referring Physician: -  EEG #: [id number] Study Date: 11/15/2022   Start Time: 12:24:02 PM    Study Duration: 20.3 		  Technical Information:					 On Instrument: [id number] Placement and Labeling of Electrodes: The EEG was performed utilizing 20 channels referential EEG connections (coronal over temporal over parasagittal montage) using all standard 10-20 electrode placements with EKG.  Recording was at a sampling rate of 256 samples per second per channel.  Time synchronized digital video recording was done simultaneously with EEG recording.  A low light infrared camera was used for low light recording.  Levy and seizure detection algorithms were utilized. CSA Technical Component: Quantitative EEG analysis using a separate Compressed Spectral Array (CSA) software package was conducted in real-time and run at bedside after set up by the technician, digitally displaying the power of electrographic frequencies included in the 1-30Hz band using a graded color map.  This data was reviewed and interpreted independently, and is reported in a separate section below.  History:  EEG/VIDEO PERFORMED BY BEDSIDE COR ASLEEP ON VENT INTUBATED NO HV PT UNRESPONSIVE PHOTIC DONE R/O SEIZURES  71 Y/O MALE WITH PMH LACUNAR UN ARCT, HTN, COPD, DMll HYPOGLYMECIA/ CARDIAC ARREST  Medication No Data.  [Abbreviation Key:  PDR=alpha rhythm/posterior dominant rhythm. A-P=anterior posterior.  Amplitude: ‘very low’:<20; ‘low’:20-49; ‘medium’:; ‘high’:>150uV.  Persistence for periodic/rhythmic patterns (% of epoch) ‘rare’:<1%; ‘occasional’:1-10%; ‘frequent’:10-50%; ‘abundant’:50-90%; ‘continuous’:>90%.  Persistence for sporadic discharges: ‘rare’:<1/hr; ‘occasional’:1/min-1/hr; ‘frequent’:>1/min; ‘abundant’:>1/10 sec.  RPP=rhythmic and periodic patterns; GRDA=generalized rhythmic delta activity; FIRDA=frontal intermittent GRDA; LRDA=lateralized rhythmic delta activity; TIRDA=temporal intermittent rhythmic delta activity;  LPD=PLED=lateralized periodic discharges; GPD=generalized periodic discharges; BIPDs =bilateral independent periodic discharges; Mf=multifocal; SIRPDs=stimulus induced rhythmic, periodic, or ictal appearing discharges; BIRDs=brief potentially ictal rhythmic discharges >4 Hz, lasting .5-10s; PFA (paroxysmal bursts >13 Hz or =8 Hz <10s).  Modifiers: +F=with fast component; +S=with spike component; +R=with rhythmic component.  S-B=burst suppression pattern.  Max=maximal. N1-drowsy; N2-stage II sleep; N3-slow wave sleep. SSS/BETS=small sharp spikes/benign epileptiform transients of sleep. HV=hyperventilation; PS=photic stimulation]  Study Interpretation:  FINDINGS:    Background: Symmetry: asymmetric Continuous: continuous PDR: absent  Reactivity: present Voltage: normal, mostly 20-150uV Anterior Posterior Gradient: present Breach: absent  Background Slowing: Generalized slowing: diffuse theta and delta activity present  Focal slowing:  -continuous left temporal attenuation and theta/delta activity  -multifocal theta and delta activity   State Changes:  Absent  Rhythmic and Periodic Patterns (RPPs): Abundant Sharp wave discharges over the central and right paracentral regions max Cz/P4/C4  LPDs over the central and right paracentral regions max Cz/P4/C4 up to 3Hz   Electrographic and Electroclinical seizures: One electroclinical seizure recorded at 12:29PM with onset over the central and right paracentral regions Cz/P4/C4, characterized by increased in amplitude and frequency of sharp wave discharges up to 4Hz with spread to the left hemisphere, EEG obscured my muscle artifact, at offset noted diffuse delta slowing. Clinically patient exhibits generalized shaking, tonic/clonic movements of bilateral upper extremities. Seizure ended at 12:36PM  Other Clinical Events: None  Activation Procedures:  -Hyperventilation was not performed.   -Photic stimulation was performed and did not elicit any abnormalities.    Artifacts: Intermittent myogenic and movement artifacts were noted.  ECG: The heart rate on single channel ECG was predominantly between 70-80 BPM.  EEG Classification / Summary:  Abnormal EEG awake / drowsy / sleep Electroclinical seizure, from central and right paracentral regions LPDs, central and right paracentral regions max Cz/P4/C4 up to 3Hz Sharp waves, central and right paracentral regions max Cz/P4/C4 Continuous left temporal attenuation and theta/delta activity Background slowing, severe   Clinical Impression: Findings indicate:  One electroclinical seizure recorded at 12:29PM from the central and right paracentral regions. Clinically patient exhibits generalized shaking, tonic/clonic movements of bilateral upper extremities. Seizure ended at 12:36PM High risk of seizures from the central and right paracentral regions with evidence of structural/functional abnormality. Severe diffuse/multifocal cerebral dysfunction, not specific as to etiology.   Jerome Arias MD Epilepsy Fellow , [facility] Department of Neurology, [facility] [employer]  [facility] EEG Reading Room Ph#: [phone] Epilepsy Answering Service after 5PM and before 8:30AM: Ph#: [phone]    REPORT OF ROUTINE EEG WITH VIDEO  [facility]: [address], [address] [place] [address], Phone: [phone] [facility]: [address] [place] [address], [place] [address], Phone: [phone] Office: [address] [place] [address], [place] [address], Phone: [phone]  Patient Name: BRANDON CAMARILLO   Age: 72 year : 1950 MRN #: -, Location: ICU3 Referring Physician: -  EEG #: [id number] Study Date: 11/15/2022   Start Time: 12:24:02 PM    Study Duration: 20.3 		  Technical Information:					 On Instrument: [id number] Placement and Labeling of Electrodes: The EEG was performed utilizing 20 channels referential EEG connections (coronal over temporal over parasagittal montage) using all standard 10-20 electrode placements with EKG.  Recording was at a sampling rate of 256 samples per second per channel.  Time synchronized digital video recording was done simultaneously with EEG recording.  A low light infrared camera was used for low light recording.  Levy and seizure detection algorithms were utilized. CSA Technical Component: Quantitative EEG analysis using a separate Compressed Spectral Array (CSA) software package was conducted in real-time and run at bedside after set up by the technician, digitally displaying the power of electrographic frequencies included in the 1-30Hz band using a graded color map.  This data was reviewed and interpreted independently, and is reported in a separate section below.  History:  EEG/VIDEO PERFORMED BY BEDSIDE COR ASLEEP ON VENT INTUBATED NO HV PT UNRESPONSIVE PHOTIC DONE R/O SEIZURES  73 Y/O MALE WITH PMH LACUNAR UN ARCT, HTN, COPD, DMll HYPOGLYMECIA/ CARDIAC ARREST  Medication No Data.  [Abbreviation Key:  PDR=alpha rhythm/posterior dominant rhythm. A-P=anterior posterior.  Amplitude: ‘very low’:<20; ‘low’:20-49; ‘medium’:; ‘high’:>150uV.  Persistence for periodic/rhythmic patterns (% of epoch) ‘rare’:<1%; ‘occasional’:1-10%; ‘frequent’:10-50%; ‘abundant’:50-90%; ‘continuous’:>90%.  Persistence for sporadic discharges: ‘rare’:<1/hr; ‘occasional’:1/min-1/hr; ‘frequent’:>1/min; ‘abundant’:>1/10 sec.  RPP=rhythmic and periodic patterns; GRDA=generalized rhythmic delta activity; FIRDA=frontal intermittent GRDA; LRDA=lateralized rhythmic delta activity; TIRDA=temporal intermittent rhythmic delta activity;  LPD=PLED=lateralized periodic discharges; GPD=generalized periodic discharges; BIPDs =bilateral independent periodic discharges; Mf=multifocal; SIRPDs=stimulus induced rhythmic, periodic, or ictal appearing discharges; BIRDs=brief potentially ictal rhythmic discharges >4 Hz, lasting .5-10s; PFA (paroxysmal bursts >13 Hz or =8 Hz <10s).  Modifiers: +F=with fast component; +S=with spike component; +R=with rhythmic component.  S-B=burst suppression pattern.  Max=maximal. N1-drowsy; N2-stage II sleep; N3-slow wave sleep. SSS/BETS=small sharp spikes/benign epileptiform transients of sleep. HV=hyperventilation; PS=photic stimulation]  Study Interpretation:  FINDINGS:    Background: Symmetry: asymmetric Continuous: continuous PDR: absent  Reactivity: present Voltage: normal, mostly 20-150uV Anterior Posterior Gradient: present Breach: absent  Background Slowing: Generalized slowing: diffuse theta and delta activity present  Focal slowing:  -continuous left temporal attenuation and theta/delta activity  -multifocal theta and delta activity   State Changes:  Absent  Rhythmic and Periodic Patterns (RPPs): Abundant Sharp wave discharges over the central and right paracentral regions max Cz/P4/C4  LPDs over the central and right paracentral regions max Cz/P4/C4 up to 3Hz   Electrographic and Electroclinical seizures: One electroclinical seizure recorded at 12:29PM with onset over the central and right paracentral regions Cz/P4/C4, characterized by increased in amplitude and frequency of sharp wave discharges up to 4Hz with spread to the left hemisphere, EEG obscured my muscle artifact, at offset noted diffuse delta slowing. Clinically patient exhibits generalized shaking, tonic/clonic movements of bilateral upper extremities. Seizure ended at 12:36PM  Other Clinical Events: None  Activation Procedures:  -Hyperventilation was not performed.   -Photic stimulation was performed and did not elicit any abnormalities.    Artifacts: Intermittent myogenic and movement artifacts were noted.  ECG: The heart rate on single channel ECG was predominantly between 70-80 BPM.  EEG Classification / Summary:  Abnormal EEG awake / drowsy / sleep Electroclinical seizure, from central and right paracentral regions LPDs, central and right paracentral regions max Cz/P4/C4 up to 3Hz Sharp waves, central and right paracentral regions max Cz/P4/C4 Continuous left temporal attenuation and theta/delta activity Background slowing, severe   Clinical Impression: Findings indicate:  One electroclinical seizure recorded at 12:29PM from the central and right paracentral regions. Clinically patient exhibits generalized shaking, tonic/clonic movements of bilateral upper extremities. Seizure ended at 12:36PM High risk of seizures from the central and right paracentral regions with evidence of structural/functional abnormality. Severe diffuse/multifocal cerebral dysfunction, not specific as to etiology.   Jerome Arias MD Epilepsy Fellow , [facility] Department of Neurology, [facility] [employer]  Marcell Chavez MD PhD Director, Epilepsy Division, [facility] EEG Reading Room Ph#: [phone] Epilepsy Answering Service after 5PM and before 8:30AM: Ph#: [phone]

## 2022-11-15 NOTE — PATIENT PROFILE ADULT - FALL HARM RISK - RISK INTERVENTIONS
Assistance OOB with selected safe patient handling equipment/Assistance with ambulation/Communicate Fall Risk and Risk Factors to all staff, patient, and family/Reinforce activity limits and safety measures with patient and family/Visual Cue: Yellow wristband/Bed in lowest position, wheels locked, appropriate side rails in place/Call bell, personal items and telephone in reach/Instruct patient to call for assistance before getting out of bed or chair/Non-slip footwear when patient is out of bed/[place] to call system/Physically safe environment - no spills, clutter or unnecessary equipment/Purposeful Proactive Rounding/Room/bathroom lighting operational, light cord in reach Pain Refusal Text: I offered to prescribe pain medication but the patient refused to take this medication.

## 2022-11-15 NOTE — PROGRESS NOTE ADULT - SUBJECTIVE AND OBJECTIVE BOX
24 hr events:  - seizure like activity 3 episodes  - received versed 4mg > 2mg > 2mg for sz  - keppra increased to 750mg  - EEG  - trops peaked  - FS made q4    SUBJECTIVE:  Pt sedated and intubated, not following commands    MEDICATIONS  (STANDING):  aspirin  chewable 81 milliGRAM(s) Oral daily  chlorhexidine 0.12% Liquid 15 milliLiter(s) Oral Mucosa every 12 hours  chlorhexidine 2% Cloths 1 Application(s) Topical <User Schedule>  fentaNYL   Infusion. 0.5 MICROgram(s)/kG/Hr (4.31 mL/Hr) IV Continuous <Continuous>  heparin   Injectable 5000 Unit(s) SubCutaneous every 8 hours  levETIRAcetam  IVPB 750 milliGRAM(s) IV Intermittent every 12 hours  norepinephrine Infusion 0.05 MICROgram(s)/kG/Min (8.08 mL/Hr) IV Continuous <Continuous>  pantoprazole  Injectable 40 milliGRAM(s) IV Push daily  piperacillin/tazobactam IVPB.. 3.375 Gram(s) IV Intermittent every 8 hours  propofol Infusion 5 MICROgram(s)/kG/Min (2.59 mL/Hr) IV Continuous <Continuous>    MEDICATIONS  (PRN):  fentaNYL    Injectable 50 MICROGram(s) IV Push every 6 hours PRN Moderate Pain (4 - 6)  midazolam Injectable 2 milliGRAM(s) IV Push every 1 hour PRN agitation/seizure      Tillman:	  [ ] None	[ ] Daily Tillman Order Placed	   Indication:	  [ ] Strict I and O's    [ ] Obstruction     [ ] Incontinence + Stage 3 or 4 Decubitus  Central Line:  [ ] None	   [ ]  Medication / TPN Administration     [ ] No Peripheral IV     ICU Vital Signs Last 24 Hrs  T(C): 38.3 (15 Nov 2022 08:30), Max: 38.3 (15 Nov 2022 08:30)  T(F): 101 (15 Nov 2022 08:30), Max: 101 (15 Nov 2022 08:30)  HR: 96 (15 Nov 2022 10:00) (83 - 121)  BP: 148/76 (15 Nov 2022 10:00) (70/48 - 184/62)  BP(mean): 93 (15 Nov 2022 10:00) (50 - 97)  ABP: --  ABP(mean): --  RR: 24 (15 Nov 2022 10:00) (21 - 30)  SpO2: 99% (15 Nov 2022 10:00) (99% - 100%)        Physical Exam:  General: NAD  HEENT: NC/AT, EOMI, PERRLA, normal hearing, no oral lesions, neck supple w/o LAD  Pulmonary: Mechanical bs noted b/l, coarse to ascultation, no use of accessory muscles  Cardiovascular: NSR, no murmurs  Abdominal: soft, NT/ND, +BS, no organomegaly  Extremities: WWP, no clubbing/cyanosis/edema  Neuro: sedated, not following commands  Pulses: palpable distal pulses    Lines/tubes/drains:    Vent settings:  Mode: AC/ CMV (Assist Control/ Continuous Mandatory Ventilation), RR (machine): 24, TV (machine): 450, FiO2: 50, PEEP: 5, ITime: 1, MAP: 10, PIP: 21    I&O's Summary    2022 07:01  -  15 Nov 2022 07:00  --------------------------------------------------------  IN: 1647.5 mL / OUT: 1770 mL / NET: -122.5 mL    15 Nov 2022 07:01  -  15 Nov 2022 11:12  --------------------------------------------------------  IN: 118.7 mL / OUT: 150 mL / NET: -31.3 mL        LABS:                        13.5   10.05 )-----------( 182      ( 15 Nov 2022 02:15 )             39.7     11-15    135  |  103  |  28<H>  ----------------------------<  214<H>  4.1   |  21<L>  |  1.94<H>    Ca    8.5      15 Nov 2022 02:15  Phos  4.2     11-15  Mg     2.0     -15    TPro  6.3  /  Alb  3.1<L>  /  TBili  0.6  /  DBili  x   /  AST  73<H>  /  ALT  156<H>  /  AlkPhos  55  11-15    PT/INR - ( 2022 22:45 )   PT: 10.7 sec;   INR: 0.89 ratio         PTT - ( 2022 22:45 )  PTT:31.7 sec  Urinalysis Basic - ( 2022 23:19 )    Color: Yellow / Appearance: Clear / S.010 / pH: x  Gluc: x / Ketone: Negative  / Bili: Negative / Urobili: Negative mg/dL   Blood: x / Protein: 100 mg/dL / Nitrite: Negative   Leuk Esterase: Negative / RBC: 3-5 /HPF / WBC Negative   Sq Epi: x / Non Sq Epi: Occasional / Bacteria: Few      CAPILLARY BLOOD GLUCOSE      POCT Blood Glucose.: 205 mg/dL (15 Nov 2022 09:20)  POCT Blood Glucose.: 211 mg/dL (15 Nov 2022 03:26)  POCT Blood Glucose.: 201 mg/dL (2022 23:19)  POCT Blood Glucose.: 221 mg/dL (2022 20:57)  POCT Blood Glucose.: 243 mg/dL (2022 18:42)  POCT Blood Glucose.: 161 mg/dL (2022 16:27)  POCT Blood Glucose.: 158 mg/dL (2022 14:16)  POCT Blood Glucose.: 157 mg/dL (2022 12:06)    LIVER FUNCTIONS - ( 15 Nov 2022 02:15 )  Alb: 3.1 g/dL / Pro: 6.3 gm/dL / ALK PHOS: 55 U/L / ALT: 156 U/L / AST: 73 U/L / GGT: x             Cultures:Culture Results:   No growth ( @ 23:19)  Culture Results:   No growth to date. ( @ 23:00)  Culture Results:   No growth to date. ( @ 22:45)      Drips:    RADIOLOGY & ADDITIONAL STUDIES:     24 hr events:  - seizure like activity 3 episodes  - received versed 4mg > 2mg > 2mg for sz  - keppra increased to 750mg  - EEG  - trops peaked  - FS made q4    SUBJECTIVE:  Pt sedated and intubated, not following commands    MEDICATIONS  (STANDING):  aspirin  chewable 81 milliGRAM(s) Oral daily  chlorhexidine 0.12% Liquid 15 milliLiter(s) Oral Mucosa every 12 hours  chlorhexidine 2% Cloths 1 Application(s) Topical <User Schedule>  fentaNYL   Infusion. 0.5 MICROgram(s)/kG/Hr (4.31 mL/Hr) IV Continuous <Continuous>  heparin   Injectable 5000 Unit(s) SubCutaneous every 8 hours  levETIRAcetam  IVPB 750 milliGRAM(s) IV Intermittent every 12 hours  norepinephrine Infusion 0.05 MICROgram(s)/kG/Min (8.08 mL/Hr) IV Continuous <Continuous>  pantoprazole  Injectable 40 milliGRAM(s) IV Push daily  piperacillin/tazobactam IVPB.. 3.375 Gram(s) IV Intermittent every 8 hours  propofol Infusion 5 MICROgram(s)/kG/Min (2.59 mL/Hr) IV Continuous <Continuous>    MEDICATIONS  (PRN):  fentaNYL    Injectable 50 MICROGram(s) IV Push every 6 hours PRN Moderate Pain (4 - 6)  midazolam Injectable 2 milliGRAM(s) IV Push every 1 hour PRN agitation/seizure      Tillman:	  [ ] None	[ ] Daily Tillman Order Placed	   Indication:	  [ ] Strict I and O's    [ ] Obstruction     [ ] Incontinence + Stage 3 or 4 Decubitus  Central Line:  [ ] None	   [ ]  Medication / TPN Administration     [ ] No Peripheral IV     ICU Vital Signs Last 24 Hrs  T(C): 38.3 (15 Nov 2022 08:30), Max: 38.3 (15 Nov 2022 08:30)  T(F): 101 (15 Nov 2022 08:30), Max: 101 (15 Nov 2022 08:30)  HR: 96 (15 Nov 2022 10:00) (83 - 121)  BP: 148/76 (15 Nov 2022 10:00) (70/48 - 184/62)  BP(mean): 93 (15 Nov 2022 10:00) (50 - 97)  RR: 24 (15 Nov 2022 10:00) (21 - 30)  SpO2: 99% (15 Nov 2022 10:00) (99% - 100%)        Physical Exam:  General: NAD  HEENT: NC/AT, EOMI, PERRLA, normal hearing, no oral lesions, neck supple w/o LAD  Pulmonary: Mechanical bs noted b/l, coarse to ascultation, no use of accessory muscles  Cardiovascular: NSR, no murmurs  Abdominal: soft, NT/ND, +BS, no organomegaly  Extremities: WWP, no clubbing/cyanosis/edema  Neuro: sedated, not following commands  Pulses: palpable distal pulses    Lines/tubes/drains:    Vent settings:  Mode: AC/ CMV (Assist Control/ Continuous Mandatory Ventilation), RR (machine): 24, TV (machine): 450, FiO2: 50, PEEP: 5, ITime: 1, MAP: 10, PIP: 21    I&O's Summary    2022 07:01  -  15 Nov 2022 07:00  --------------------------------------------------------  IN: 1647.5 mL / OUT: 1770 mL / NET: -122.5 mL    15 Nov 2022 07:01  -  15 Nov 2022 11:12  --------------------------------------------------------  IN: 118.7 mL / OUT: 150 mL / NET: -31.3 mL        LABS:                        13.5   10.05 )-----------( 182      ( 15 Nov 2022 02:15 )             39.7     11-15    135  |  103  |  28<H>  ----------------------------<  214<H>  4.1   |  21<L>  |  1.94<H>    Ca    8.5      15 Nov 2022 02:15  Phos  4.2     11-15  Mg     2.0     11-15    TPro  6.3  /  Alb  3.1<L>  /  TBili  0.6  /  DBili  x   /  AST  73<H>  /  ALT  156<H>  /  AlkPhos  55  11-15    PT/INR - ( 2022 22:45 )   PT: 10.7 sec;   INR: 0.89 ratio         PTT - ( 2022 22:45 )  PTT:31.7 sec  Urinalysis Basic - ( 2022 23:19 )    Color: Yellow / Appearance: Clear / S.010 / pH: x  Gluc: x / Ketone: Negative  / Bili: Negative / Urobili: Negative mg/dL   Blood: x / Protein: 100 mg/dL / Nitrite: Negative   Leuk Esterase: Negative / RBC: 3-5 /HPF / WBC Negative   Sq Epi: x / Non Sq Epi: Occasional / Bacteria: Few      CAPILLARY BLOOD GLUCOSE      POCT Blood Glucose.: 205 mg/dL (15 Nov 2022 09:20)  POCT Blood Glucose.: 211 mg/dL (15 Nov 2022 03:26)  POCT Blood Glucose.: 201 mg/dL (2022 23:19)  POCT Blood Glucose.: 221 mg/dL (2022 20:57)  POCT Blood Glucose.: 243 mg/dL (2022 18:42)  POCT Blood Glucose.: 161 mg/dL (2022 16:27)  POCT Blood Glucose.: 158 mg/dL (2022 14:16)  POCT Blood Glucose.: 157 mg/dL (2022 12:06)    LIVER FUNCTIONS - ( 15 Nov 2022 02:15 )  Alb: 3.1 g/dL / Pro: 6.3 gm/dL / ALK PHOS: 55 U/L / ALT: 156 U/L / AST: 73 U/L / GGT: x             Cultures:Culture Results:   No growth ( @ 23:19)  Culture Results:   No growth to date. ( @ 23:00)  Culture Results:   No growth to date. ( @ 22:45)      Drips:    RADIOLOGY & ADDITIONAL STUDIES:

## 2022-11-15 NOTE — PATIENT PROFILE ADULT - PACKS YRS CALCULATION
Application of Fluoride Varnish    Dental Fluoride Varnish and Post-Treatment Instructions: Reviewed with grandmother   used: No    Dental Fluoride applied to teeth by: Savana Yuan cma  Fluoride was well tolerated    LOT #: [id number]  /EXPIRATION DATE:  7/2020      Savana Yuan CMA     3

## 2022-11-16 LAB
ALBUMIN SERPL ELPH-MCNC: 2.6 G/DL — LOW (ref 3.3–5)
ALP SERPL-CCNC: 51 U/L — SIGNIFICANT CHANGE UP (ref 40–120)
ALT FLD-CCNC: 82 U/L — HIGH (ref 12–78)
ANION GAP SERPL CALC-SCNC: 15 MMOL/L — SIGNIFICANT CHANGE UP (ref 5–17)
AST SERPL-CCNC: 23 U/L — SIGNIFICANT CHANGE UP (ref 15–37)
BASOPHILS # BLD AUTO: 0.06 K/UL — SIGNIFICANT CHANGE UP (ref 0–0.2)
BASOPHILS NFR BLD AUTO: 0.7 % — SIGNIFICANT CHANGE UP (ref 0–2)
BILIRUB SERPL-MCNC: 0.7 MG/DL — SIGNIFICANT CHANGE UP (ref 0.2–1.2)
BUN SERPL-MCNC: 22 MG/DL — SIGNIFICANT CHANGE UP (ref 7–23)
CALCIUM SERPL-MCNC: 8.4 MG/DL — LOW (ref 8.5–10.1)
CHLORIDE SERPL-SCNC: 107 MMOL/L — SIGNIFICANT CHANGE UP (ref 96–108)
CO2 SERPL-SCNC: 18 MMOL/L — LOW (ref 22–31)
CREAT SERPL-MCNC: 1.5 MG/DL — HIGH (ref 0.5–1.3)
CULTURE RESULTS: SIGNIFICANT CHANGE UP
EGFR: 49 ML/MIN/1.73M2 — LOW
EOSINOPHIL # BLD AUTO: 0.24 K/UL — SIGNIFICANT CHANGE UP (ref 0–0.5)
EOSINOPHIL NFR BLD AUTO: 2.7 % — SIGNIFICANT CHANGE UP (ref 0–6)
GLUCOSE BLDC GLUCOMTR-MCNC: 156 MG/DL — HIGH (ref 70–99)
GLUCOSE BLDC GLUCOMTR-MCNC: 157 MG/DL — HIGH (ref 70–99)
GLUCOSE BLDC GLUCOMTR-MCNC: 159 MG/DL — HIGH (ref 70–99)
GLUCOSE BLDC GLUCOMTR-MCNC: 161 MG/DL — HIGH (ref 70–99)
GLUCOSE SERPL-MCNC: 173 MG/DL — HIGH (ref 70–99)
GRAM STN FLD: SIGNIFICANT CHANGE UP
HCT VFR BLD CALC: 39.6 % — SIGNIFICANT CHANGE UP (ref 39–50)
HGB BLD-MCNC: 12.9 G/DL — LOW (ref 13–17)
IMM GRANULOCYTES NFR BLD AUTO: 0.3 % — SIGNIFICANT CHANGE UP (ref 0–0.9)
LYMPHOCYTES # BLD AUTO: 1.8 K/UL — SIGNIFICANT CHANGE UP (ref 1–3.3)
LYMPHOCYTES # BLD AUTO: 20.4 % — SIGNIFICANT CHANGE UP (ref 13–44)
MAGNESIUM SERPL-MCNC: 2.3 MG/DL — SIGNIFICANT CHANGE UP (ref 1.6–2.6)
MCHC RBC-ENTMCNC: 30.3 PG — SIGNIFICANT CHANGE UP (ref 27–34)
MCHC RBC-ENTMCNC: 32.6 G/DL — SIGNIFICANT CHANGE UP (ref 32–36)
MCV RBC AUTO: 93 FL — SIGNIFICANT CHANGE UP (ref 80–100)
MONOCYTES # BLD AUTO: 1.08 K/UL — HIGH (ref 0–0.9)
MONOCYTES NFR BLD AUTO: 12.2 % — SIGNIFICANT CHANGE UP (ref 2–14)
NEUTROPHILS # BLD AUTO: 5.62 K/UL — SIGNIFICANT CHANGE UP (ref 1.8–7.4)
NEUTROPHILS NFR BLD AUTO: 63.7 % — SIGNIFICANT CHANGE UP (ref 43–77)
NRBC # BLD: 0 /100 WBCS — SIGNIFICANT CHANGE UP (ref 0–0)
PHOSPHATE SERPL-MCNC: 3.4 MG/DL — SIGNIFICANT CHANGE UP (ref 2.5–4.5)
PLATELET # BLD AUTO: 191 K/UL — SIGNIFICANT CHANGE UP (ref 150–400)
POTASSIUM SERPL-MCNC: 3.5 MMOL/L — SIGNIFICANT CHANGE UP (ref 3.5–5.3)
POTASSIUM SERPL-SCNC: 3.5 MMOL/L — SIGNIFICANT CHANGE UP (ref 3.5–5.3)
PROT SERPL-MCNC: 6 GM/DL — SIGNIFICANT CHANGE UP (ref 6–8.3)
RBC # BLD: 4.26 M/UL — SIGNIFICANT CHANGE UP (ref 4.2–5.8)
RBC # FLD: 14 % — SIGNIFICANT CHANGE UP (ref 10.3–14.5)
SODIUM SERPL-SCNC: 140 MMOL/L — SIGNIFICANT CHANGE UP (ref 135–145)
SPECIMEN SOURCE: SIGNIFICANT CHANGE UP
WBC # BLD: 8.83 K/UL — SIGNIFICANT CHANGE UP (ref 3.8–10.5)
WBC # FLD AUTO: 8.83 K/UL — SIGNIFICANT CHANGE UP (ref 3.8–10.5)

## 2022-11-16 PROCEDURE — 99291 CRITICAL CARE FIRST HOUR: CPT

## 2022-11-16 RX ORDER — LACOSAMIDE 50 MG/1
200 TABLET ORAL ONCE
Refills: 0 | Status: DISCONTINUED | OUTPATIENT
Start: 2022-11-16 | End: 2022-11-16

## 2022-11-16 RX ORDER — LACOSAMIDE 50 MG/1
200 TABLET ORAL
Refills: 0 | Status: DISCONTINUED | OUTPATIENT
Start: 2022-11-17 | End: 2022-11-18

## 2022-11-16 RX ORDER — POTASSIUM CHLORIDE 20 MEQ
40 PACKET (EA) ORAL ONCE
Refills: 0 | Status: COMPLETED | OUTPATIENT
Start: 2022-11-16 | End: 2022-11-16

## 2022-11-16 RX ORDER — LEVETIRACETAM 250 MG/1
1500 TABLET, FILM COATED ORAL
Refills: 0 | Status: DISCONTINUED | OUTPATIENT
Start: 2022-11-16 | End: 2022-11-19

## 2022-11-16 RX ADMIN — PANTOPRAZOLE SODIUM 40 MILLIGRAM(S): 20 TABLET, DELAYED RELEASE ORAL at 12:49

## 2022-11-16 RX ADMIN — Medication 8.08 MICROGRAM(S)/KG/MIN: at 10:32

## 2022-11-16 RX ADMIN — PROPOFOL 5.17 MICROGRAM(S)/KG/MIN: 10 INJECTION, EMULSION INTRAVENOUS at 21:49

## 2022-11-16 RX ADMIN — PIPERACILLIN AND TAZOBACTAM 25 GRAM(S): 4; .5 INJECTION, POWDER, LYOPHILIZED, FOR SOLUTION INTRAVENOUS at 14:22

## 2022-11-16 RX ADMIN — PROPOFOL 5.17 MICROGRAM(S)/KG/MIN: 10 INJECTION, EMULSION INTRAVENOUS at 02:23

## 2022-11-16 RX ADMIN — CHLORHEXIDINE GLUCONATE 15 MILLILITER(S): 213 SOLUTION TOPICAL at 18:47

## 2022-11-16 RX ADMIN — FENTANYL CITRATE 4.31 MICROGRAM(S)/KG/HR: 50 INJECTION INTRAVENOUS at 10:32

## 2022-11-16 RX ADMIN — LEVETIRACETAM 1500 MILLIGRAM(S): 250 TABLET, FILM COATED ORAL at 18:41

## 2022-11-16 RX ADMIN — PROPOFOL 5.17 MICROGRAM(S)/KG/MIN: 10 INJECTION, EMULSION INTRAVENOUS at 06:11

## 2022-11-16 RX ADMIN — CHLORHEXIDINE GLUCONATE 1 APPLICATION(S): 213 SOLUTION TOPICAL at 06:13

## 2022-11-16 RX ADMIN — Medication 40 MILLIEQUIVALENT(S): at 06:10

## 2022-11-16 RX ADMIN — PIPERACILLIN AND TAZOBACTAM 25 GRAM(S): 4; .5 INJECTION, POWDER, LYOPHILIZED, FOR SOLUTION INTRAVENOUS at 06:12

## 2022-11-16 RX ADMIN — PROPOFOL 5.17 MICROGRAM(S)/KG/MIN: 10 INJECTION, EMULSION INTRAVENOUS at 17:10

## 2022-11-16 RX ADMIN — PROPOFOL 5.17 MICROGRAM(S)/KG/MIN: 10 INJECTION, EMULSION INTRAVENOUS at 10:32

## 2022-11-16 RX ADMIN — HEPARIN SODIUM 5000 UNIT(S): 5000 INJECTION INTRAVENOUS; SUBCUTANEOUS at 22:57

## 2022-11-16 RX ADMIN — LACOSAMIDE 140 MILLIGRAM(S): 50 TABLET ORAL at 18:41

## 2022-11-16 RX ADMIN — PIPERACILLIN AND TAZOBACTAM 25 GRAM(S): 4; .5 INJECTION, POWDER, LYOPHILIZED, FOR SOLUTION INTRAVENOUS at 22:56

## 2022-11-16 RX ADMIN — Medication 8.08 MICROGRAM(S)/KG/MIN: at 06:15

## 2022-11-16 RX ADMIN — HEPARIN SODIUM 5000 UNIT(S): 5000 INJECTION INTRAVENOUS; SUBCUTANEOUS at 06:12

## 2022-11-16 RX ADMIN — LEVETIRACETAM 400 MILLIGRAM(S): 250 TABLET, FILM COATED ORAL at 06:12

## 2022-11-16 RX ADMIN — HEPARIN SODIUM 5000 UNIT(S): 5000 INJECTION INTRAVENOUS; SUBCUTANEOUS at 14:29

## 2022-11-16 RX ADMIN — Medication 81 MILLIGRAM(S): at 12:49

## 2022-11-16 RX ADMIN — CHLORHEXIDINE GLUCONATE 15 MILLILITER(S): 213 SOLUTION TOPICAL at 06:11

## 2022-11-16 NOTE — DIETITIAN INITIAL EVALUATION ADULT - OTHER INFO
Saw patient in CCU, intubated at time of visit; pt's son at bedside to provide information.    Son reports patient had adequate appetite and intake PTA; no food allergies; follows a balanced diet; patient lives alone, son does shopping and cooking and visits patient frequently.    Patient NPO since admission.    Son reports patient UBW 174lb; states patient lost weight over 2 year time frame due to Victoza, patient was heavier prior; son states pt's height is 5'5.    Per pt's medication list that son provided from cell phone, patient takes Ferosul iron supplement at home; for diabetes management patient takes Jardiance, Metformin, Victoza pen, Insulin Lispro (10 units x 2/day), and Insulin Lantus (20 units nightly); son states patient checks his own blood glucose and administers his own insulin. Patient seen in CCU, intubated at time of visit; pt's son at bedside to provide information.    Son reports patient had adequate appetite and intake PTA; no food allergies; follows a balanced diet; patient lives alone, son does shopping and cooking and visits patient frequently.    Patient NPO since admission.    Son reports patient UBW 174lb; states patient lost weight over 2 year time frame due to Victoza, patient was heavier prior; son states pt's height is 5'5.    Per pt's medication list that son provided from cell phone, patient takes Ferosul iron supplement at home; for diabetes management patient takes Jardiance, Metformin, Victoza pen, Insulin Lispro (10 units x 2/day), and Insulin Lantus (20 units nightly); son states patient checks his own blood glucose and administers his own insulin. Patient seen in ICU, intubated at time of visit; pt's son at bedside to provide information.    Son reports patient had adequate appetite and intake PTA; no food allergies; follows a balanced diet; patient lives alone, son does shopping and cooking and visits patient frequently.    Patient NPO since admission.    Son reports patient UBW 174lb; states patient lost weight over 2 year time frame due to Victoza, patient was heavier prior; son states pt's height is 5'5.    Per pt's medication list that son provided from cell phone, patient takes Ferosul iron supplement at home; for diabetes management patient takes Jardiance, Metformin, Victoza pen, Insulin Lispro (10 units x 2/day), and Insulin Lantus (20 units nightly); son states patient checks his own blood glucose and administers his own insulin. Patient seen in ICU, intubated at time of visit; pt's son at bedside to provide information.    Son reports patient had adequate appetite and intake PTA; no food allergies; follows a balanced diet; patient lives alone, son does shopping and cooking and visits patient frequently.    Patient NPO since admission; per flow sheets patient receiving propofol at 23.3ml/hour (provides 25kcal/hr).    Son reports patient UBW 174lb; states patient lost weight over 2 year time frame due to Victoza, patient was heavier prior; son states pt's height is 5'5.    Per pt's medication list that son provided from cell phone, patient takes Ferosul iron supplement at home; for diabetes management patient takes Jardiance, Metformin, Victoza pen, Insulin Lispro (10 units x 2/day), and Insulin Lantus (20 units nightly); son states patient checks his own blood glucose and administers his own insulin. Patient intubated at time of visit; pt's son at bedside to provide information.    Son reports patient had adequate appetite and intake PTA; no food allergies; follows a balanced diet; patient lives alone, son does shopping and cooking and visits patient frequently.    Patient NPO since admission; per flow sheets patient receiving propofol at 23.3ml/hour (provides 25kcal/hr).    Son reports patient UBW 174lb; states patient lost weight over 2 year time frame due to Victoza, patient was heavier prior; son states pt's height is 5'5.    Per pt's medication list that son provided from cell phone, patient takes Ferosul iron supplement at home; for diabetes management patient takes Jardiance, Metformin, Victoza pen, Insulin Lispro (10 units x 2/day), and Insulin Lantus (20 units nightly); son states patient checks his own blood glucose and administers his own insulin. Per GOC note, patient prognosis is poor; patient with seizure-like activity, patient with ELMER; patient remains full code.    Per pt's medication list via son, patient takes Ferosul at home; for diabetes management patient takes Jardiance, Metformin, Victoza pen, Insulin Lispro (10 units x 2/day), and Insulin Lantus (20 units nightly)

## 2022-11-16 NOTE — DIETITIAN INITIAL EVALUATION ADULT - ENERGY INTAKE
NPO 26-Jun-2022 23:22 Patient seen on ventilator with OGT in place for gastric decompression; pt's son at bedside to provide information.    Son reports patient had adequate appetite and intake PTA; no food allergies; followed a balanced diet; patient lived alone, son does shopping and cooking and visits patient frequently.    Son reports patient UBW 174lb; states patient lost weight over 2 year time frame due to Victoza, patient was heavier prior; son states pt's height is 5'5.  Patient NPO since admission; per flow sheets patient received propofol 581ml (provides 639kcal) on 11-15.

## 2022-11-16 NOTE — PROGRESS NOTE ADULT - ASSESSMENT
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 Unknown how long pt unresponsive for at home. Blood sugar in the 40s with EMS s/p glucagon. On arrival to ER pt hypothermic and agonally breathing. Pt became unresponsive with loss of pulse; ACLS initiated. PEA arrest with ROSC after approximately 5 min s/p Epi x2.  Pt intubated during CODE BLUE. Per son pt on the day prior to presentation reported low blood sugar reads on his glucometer in the range of 80s. Son believes pt continued to take his insulin and oral diabetic regimen. Son states that pt was eating but may have been eating less in the last few days. Later on pt noted to have tonic-clonic Sz. ICU dx: 1) Hypoglycemia 2) PEA arrest 3) Takotsubo cardiomyopathy found on Echo 4) New onset Sz either 2 to hypoglycemia vs anoxic encephalopathy    5) ELMER 2 to ATN 6) Shock liver       Resp: Cont current vent settings  ID: Cont Zosyn for now  CVS: Hypotension requiring Levophed to maintain MAP >65 likely 2 to postarrest myocardial dysfxn  Heme: DVT prophylaxis with sq Heparin   FEN: Cont enteral feeds  Endo: Follow Glu  GI: Trend LFTs  Renal: Follow BUN/Cr and UO  Neuro: Cont neuro checks/ Increase Keppra and Vimpat as per Neuro

## 2022-11-16 NOTE — PROGRESS NOTE ADULT - SUBJECTIVE AND OBJECTIVE BOX
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 Unknown how long pt unresponsive for at home. Blood sugar in the 40s with EMS s/p glucagon. On arrival to ER pt hypothermic and agonally breathing. Pt became unresponsive with loss of pulse; ACLS initiated. PEA arrest with ROSC after approximately 5 min s/p Epi x2.  Pt intubated during CODE BLUE. Per son pt on the day prior to presentation reported low blood sugar reads on his glucometer in the range of 80s. Son believes pt continued to take his insulin and oral diabetic regimen. Son states that pt was eating but may have been eating less in the last few days. Later on pt noted to have tonic-clonic Sz. ICU dx: 1) Hypoglycemia 2) PEA arrest 3) Takotsubo cardiomyopathy found on Echo 4) New onset Sz either 2 to hypoglycemia vs anoxic encephalopathy    5) ELMER 2 to ATN 6) Shock liver       ## Labs:  CBC:                        12.9   8.83  )-----------( 191      ( 2022 04:00 )             39.6     Chem:      140  |  107  |  22  ----------------------------<  173<H>  3.5   |  18<L>  |  1.50<H>    Ca    8.4<L>      2022 04:00  Phos  3.4       Mg     2.3         TPro  6.0  /  Alb  2.6<L>  /  TBili  0.7  /  DBili  x   /  AST  23  /  ALT  82<H>  /  AlkPhos  51      Coags:          ## Imaging:    ## Medications:  piperacillin/tazobactam IVPB.. 3.375 Gram(s) IV Intermittent every 8 hours    norepinephrine Infusion 0.05 MICROgram(s)/kG/Min IV Continuous <Continuous>        aspirin  chewable 81 milliGRAM(s) Oral daily  heparin   Injectable 5000 Unit(s) SubCutaneous every 8 hours    pantoprazole  Injectable 40 milliGRAM(s) IV Push daily    fentaNYL    Injectable 50 MICROGram(s) IV Push every 6 hours PRN  fentaNYL   Infusion. 0.5 MICROgram(s)/kG/Hr IV Continuous <Continuous>  lacosamide 200 milliGRAM(s) Oral two times a day  levETIRAcetam 1500 milliGRAM(s) Oral two times a day  midazolam Injectable 2 milliGRAM(s) IV Push every 1 hour PRN  propofol Infusion 10 MICROgram(s)/kG/Min IV Continuous <Continuous>      ## Vitals:  T(C): 37.2 (22 @ 19:29), Max: 37.9 (11-15-22 @ 23:32)  HR: 94 (22 @ 19:00) (72 - 115)  BP: 105/65 (22 @ 19:00) (87/63 - 159/89)  BP(mean): 74 (22 @ 19:00) (59 - 105)  RR: 24 (22 @ 19:00) (24 - 24)  SpO2: 98% (22 @ 19:00) (97% - 100%)  Wt(kg): --  Vent: Mode: AC/ CMV (Assist Control/ Continuous Mandatory Ventilation), RR (machine): 24, RR (patient): 24, TV (machine): 450, FiO2: 50, PEEP: 5, PIP: 22  AB-15 @ 07:01  -   @ 07:00  --------------------------------------------------------  IN: 1770.5 mL / OUT: 2100 mL / NET: -329.5 mL     @ 07:01  -   @ 20:34  --------------------------------------------------------  IN: 655.2 mL / OUT: 695 mL / NET: -39.8 mL          ## P/E:  Gen: lying comfortably in bed in no apparent distress  Mouth: (+) ETT  Lungs: CTA  Heart: Irregular  Abd: Soft/+BS/ Non-tender  Ext: UE edema  Neuro: Sedation held; pupils sluggish, (+) spont resp, L foot jerking movements    CENTRAL LINE: [ ] YES [ ] NO  LOCATION:   DATE INSERTED:  REMOVE: [ ] YES [ ] NO      LUJAN: [ ] YES [ ] NO    DATE INSERTED:  REMOVE:  [ ] YES [ ] NO      A-LINE:  [ ] YES [ ] NO  LOCATION:   DATE INSERTED:  REMOVE:  [ ] YES [ ] NO  EXPLAIN:      CODE STATUS: [x ] full code  [ ] DNR  [ ] DNI  [ ] MOLST  Goals of care discussion: [ ] yes

## 2022-11-16 NOTE — DIETITIAN INITIAL EVALUATION ADULT - REASON
No visible signs of malnutrition observed No physical signs of fat/muscle loss observed, patient on ventilator

## 2022-11-16 NOTE — DIETITIAN INITIAL EVALUATION ADULT - SIGNS/SYMPTOMS
Patient NPO since admission due to s/p intubation, tonic-clonic seizures Patient NPO x 2 days due to s/p intubation, seizure-like activity, cardiac arrest Patient NPO x 2 days

## 2022-11-16 NOTE — DIETITIAN INITIAL EVALUATION ADULT - ADD RECOMMEND
If medically feasible, recommend tube feeding Vital AF 1.2 @ 10 ml/hr and advance 5ml every 6 hours as tolerated to goal rate Vital AF 1.2 @ 60 ml/ms=1721 calories, 108 grams protein, 1167 ml free water, 121% RDIs  If medically feasible, recommend tube feeding Vital AF 1.2 @ 10 ml/hr and advance 5ml q 8 hours as tolerated to goal rate Vital AF 1.2 @ 50 ml/zx=0697 calories, 90 grams protein, 973 ml free water, 101% RDIs +additional kcal from propofol as per infused volume

## 2022-11-16 NOTE — DIETITIAN INITIAL EVALUATION ADULT - PERTINENT LABORATORY DATA
11-16    140  |  107  |  22  ----------------------------<  173<H>  3.5   |  18<L>  |  1.50<H>    Ca    8.4<L>      16 Nov 2022 04:00  Phos  3.4     11-16  Mg     2.3     11-16    TPro  6.0  /  Alb  2.6<L>  /  TBili  0.7  /  DBili  x   /  AST  23  /  ALT  82<H>  /  AlkPhos  51  11-16  POCT Blood Glucose.: 159 mg/dL (11-16-22 @ 06:37)  A1C with Estimated Average Glucose Result: 8.8 %, 206mg/dL (11-14-22 @ 05:30)   11-16    140  |  107  |  22  ----------------------------<  173<H>  3.5   |  18<L>  |  1.50<H>    Ca    8.4<L>      16 Nov 2022 04:00  Phos  3.4     11-16  Mg     2.3     11-16    TPro  6.0  /  Alb  2.6<L>  /  TBili  0.7  /  DBili  x   /  AST  23  /  ALT  82<H>  /  AlkPhos  51  11-16  POCT Blood Glucose.: 159 mg/dL (11-16-22 @ 06:37)  A1Ct: 8.8 %, Estimated Average Glucose: 206mg/dL (high) (11-14-22 @ 05:30)

## 2022-11-16 NOTE — DIETITIAN INITIAL EVALUATION ADULT - NSFNSGIIOFT_GEN_A_CORE
11-15-22 @ 07:01  -  11-16-22 @ 07:00  --------------------------------------------------------  OUT:    Nasogastric/Oral tube (mL): 125 mL  Total OUT: 125 mL    Total NET: -125 mL

## 2022-11-16 NOTE — DIETITIAN INITIAL EVALUATION ADULT - NUTRITIONGOAL OUTCOME1
Patient to consume >75% of protein-energy needs through tolerated route. Patient to meet >50-75% of protein-energy needs via OG tube. Patient to meet >50-75% of protein-energy needs via OGT.

## 2022-11-16 NOTE — DIETITIAN INITIAL EVALUATION ADULT - OTHER CALCULATIONS
Ht (cm): 165.1cm  Wt (kg): 82.2kg  BMI: 30.1    IBW: 67.1kg                    %IBW: 123%                     UBW: 78.9kg                  %UBW: 104%

## 2022-11-16 NOTE — DIETITIAN INITIAL EVALUATION ADULT - PERTINENT MEDS FT
heparin, levETIRAcetam, pantoprazole, piperacilling/tazobactam, propofol Infusion 10 MICROgram(s)/kG/Min (5.17 mL/Hr) IV Continuous <Continuous>, midazolam

## 2022-11-17 LAB
ALBUMIN SERPL ELPH-MCNC: 2.3 G/DL — LOW (ref 3.3–5)
ALP SERPL-CCNC: 51 U/L — SIGNIFICANT CHANGE UP (ref 40–120)
ALT FLD-CCNC: 58 U/L — SIGNIFICANT CHANGE UP (ref 12–78)
ANION GAP SERPL CALC-SCNC: 14 MMOL/L — SIGNIFICANT CHANGE UP (ref 5–17)
AST SERPL-CCNC: 19 U/L — SIGNIFICANT CHANGE UP (ref 15–37)
BASE EXCESS BLDA CALC-SCNC: -10.2 MMOL/L — LOW (ref -2–3)
BASOPHILS # BLD AUTO: 0.06 K/UL — SIGNIFICANT CHANGE UP (ref 0–0.2)
BASOPHILS NFR BLD AUTO: 0.8 % — SIGNIFICANT CHANGE UP (ref 0–2)
BILIRUB SERPL-MCNC: 0.6 MG/DL — SIGNIFICANT CHANGE UP (ref 0.2–1.2)
BLOOD GAS COMMENTS ARTERIAL: SIGNIFICANT CHANGE UP
BUN SERPL-MCNC: 24 MG/DL — HIGH (ref 7–23)
CALCIUM SERPL-MCNC: 8.6 MG/DL — SIGNIFICANT CHANGE UP (ref 8.5–10.1)
CHLORIDE SERPL-SCNC: 109 MMOL/L — HIGH (ref 96–108)
CO2 BLDA-SCNC: 16 MMOL/L — LOW (ref 19–24)
CO2 SERPL-SCNC: 18 MMOL/L — LOW (ref 22–31)
CREAT SERPL-MCNC: 1.57 MG/DL — HIGH (ref 0.5–1.3)
EGFR: 46 ML/MIN/1.73M2 — LOW
EOSINOPHIL # BLD AUTO: 0.58 K/UL — HIGH (ref 0–0.5)
EOSINOPHIL NFR BLD AUTO: 8.1 % — HIGH (ref 0–6)
GAS PNL BLDA: SIGNIFICANT CHANGE UP
GLUCOSE BLDC GLUCOMTR-MCNC: 152 MG/DL — HIGH (ref 70–99)
GLUCOSE BLDC GLUCOMTR-MCNC: 165 MG/DL — HIGH (ref 70–99)
GLUCOSE BLDC GLUCOMTR-MCNC: 173 MG/DL — HIGH (ref 70–99)
GLUCOSE BLDC GLUCOMTR-MCNC: 189 MG/DL — HIGH (ref 70–99)
GLUCOSE BLDC GLUCOMTR-MCNC: 228 MG/DL — HIGH (ref 70–99)
GLUCOSE SERPL-MCNC: 167 MG/DL — HIGH (ref 70–99)
HCO3 BLDA-SCNC: 15 MMOL/L — LOW (ref 21–28)
HCT VFR BLD CALC: 39.8 % — SIGNIFICANT CHANGE UP (ref 39–50)
HGB BLD-MCNC: 13 G/DL — SIGNIFICANT CHANGE UP (ref 13–17)
HOROWITZ INDEX BLDA+IHG-RTO: 40 — SIGNIFICANT CHANGE UP
IMM GRANULOCYTES NFR BLD AUTO: 0.3 % — SIGNIFICANT CHANGE UP (ref 0–0.9)
LYMPHOCYTES # BLD AUTO: 1.21 K/UL — SIGNIFICANT CHANGE UP (ref 1–3.3)
LYMPHOCYTES # BLD AUTO: 16.9 % — SIGNIFICANT CHANGE UP (ref 13–44)
MAGNESIUM SERPL-MCNC: 2.2 MG/DL — SIGNIFICANT CHANGE UP (ref 1.6–2.6)
MCHC RBC-ENTMCNC: 30.7 PG — SIGNIFICANT CHANGE UP (ref 27–34)
MCHC RBC-ENTMCNC: 32.7 G/DL — SIGNIFICANT CHANGE UP (ref 32–36)
MCV RBC AUTO: 93.9 FL — SIGNIFICANT CHANGE UP (ref 80–100)
MONOCYTES # BLD AUTO: 0.72 K/UL — SIGNIFICANT CHANGE UP (ref 0–0.9)
MONOCYTES NFR BLD AUTO: 10.1 % — SIGNIFICANT CHANGE UP (ref 2–14)
NEUTROPHILS # BLD AUTO: 4.56 K/UL — SIGNIFICANT CHANGE UP (ref 1.8–7.4)
NEUTROPHILS NFR BLD AUTO: 63.8 % — SIGNIFICANT CHANGE UP (ref 43–77)
NRBC # BLD: 0 /100 WBCS — SIGNIFICANT CHANGE UP (ref 0–0)
PCO2 BLDA: 29 MMHG — LOW (ref 32–46)
PH BLDA: 7.31 — LOW (ref 7.35–7.45)
PHOSPHATE SERPL-MCNC: 3.5 MG/DL — SIGNIFICANT CHANGE UP (ref 2.5–4.5)
PLATELET # BLD AUTO: 196 K/UL — SIGNIFICANT CHANGE UP (ref 150–400)
PO2 BLDA: 138 MMHG — HIGH (ref 83–108)
POTASSIUM SERPL-MCNC: 3.9 MMOL/L — SIGNIFICANT CHANGE UP (ref 3.5–5.3)
POTASSIUM SERPL-SCNC: 3.9 MMOL/L — SIGNIFICANT CHANGE UP (ref 3.5–5.3)
PROINSULIN SERPL-MCNC: 3.8 PMOL/L — SIGNIFICANT CHANGE UP (ref 0–10)
PROT SERPL-MCNC: 6.4 GM/DL — SIGNIFICANT CHANGE UP (ref 6–8.3)
RBC # BLD: 4.24 M/UL — SIGNIFICANT CHANGE UP (ref 4.2–5.8)
RBC # FLD: 14.2 % — SIGNIFICANT CHANGE UP (ref 10.3–14.5)
SAO2 % BLDA: 98.7 % — HIGH (ref 94–98)
SODIUM SERPL-SCNC: 141 MMOL/L — SIGNIFICANT CHANGE UP (ref 135–145)
WBC # BLD: 7.15 K/UL — SIGNIFICANT CHANGE UP (ref 3.8–10.5)
WBC # FLD AUTO: 7.15 K/UL — SIGNIFICANT CHANGE UP (ref 3.8–10.5)

## 2022-11-17 PROCEDURE — 99291 CRITICAL CARE FIRST HOUR: CPT | Mod: GC

## 2022-11-17 PROCEDURE — 99255 IP/OBS CONSLTJ NEW/EST HI 80: CPT

## 2022-11-17 PROCEDURE — 95816 EEG AWAKE AND DROWSY: CPT | Mod: 26

## 2022-11-17 RX ADMIN — HEPARIN SODIUM 5000 UNIT(S): 5000 INJECTION INTRAVENOUS; SUBCUTANEOUS at 22:00

## 2022-11-17 RX ADMIN — Medication 8.08 MICROGRAM(S)/KG/MIN: at 08:19

## 2022-11-17 RX ADMIN — CHLORHEXIDINE GLUCONATE 1 APPLICATION(S): 213 SOLUTION TOPICAL at 04:30

## 2022-11-17 RX ADMIN — Medication 8.08 MICROGRAM(S)/KG/MIN: at 05:34

## 2022-11-17 RX ADMIN — PANTOPRAZOLE SODIUM 40 MILLIGRAM(S): 20 TABLET, DELAYED RELEASE ORAL at 12:35

## 2022-11-17 RX ADMIN — LACOSAMIDE 200 MILLIGRAM(S): 50 TABLET ORAL at 17:31

## 2022-11-17 RX ADMIN — PIPERACILLIN AND TAZOBACTAM 25 GRAM(S): 4; .5 INJECTION, POWDER, LYOPHILIZED, FOR SOLUTION INTRAVENOUS at 05:35

## 2022-11-17 RX ADMIN — Medication 81 MILLIGRAM(S): at 12:36

## 2022-11-17 RX ADMIN — CHLORHEXIDINE GLUCONATE 15 MILLILITER(S): 213 SOLUTION TOPICAL at 17:31

## 2022-11-17 RX ADMIN — LACOSAMIDE 200 MILLIGRAM(S): 50 TABLET ORAL at 05:58

## 2022-11-17 RX ADMIN — PROPOFOL 5.17 MICROGRAM(S)/KG/MIN: 10 INJECTION, EMULSION INTRAVENOUS at 09:08

## 2022-11-17 RX ADMIN — LEVETIRACETAM 1500 MILLIGRAM(S): 250 TABLET, FILM COATED ORAL at 05:59

## 2022-11-17 RX ADMIN — PROPOFOL 5.17 MICROGRAM(S)/KG/MIN: 10 INJECTION, EMULSION INTRAVENOUS at 08:19

## 2022-11-17 RX ADMIN — HEPARIN SODIUM 5000 UNIT(S): 5000 INJECTION INTRAVENOUS; SUBCUTANEOUS at 14:49

## 2022-11-17 RX ADMIN — PROPOFOL 5.17 MICROGRAM(S)/KG/MIN: 10 INJECTION, EMULSION INTRAVENOUS at 05:34

## 2022-11-17 RX ADMIN — HEPARIN SODIUM 5000 UNIT(S): 5000 INJECTION INTRAVENOUS; SUBCUTANEOUS at 05:35

## 2022-11-17 RX ADMIN — LEVETIRACETAM 1500 MILLIGRAM(S): 250 TABLET, FILM COATED ORAL at 17:31

## 2022-11-17 RX ADMIN — CHLORHEXIDINE GLUCONATE 15 MILLILITER(S): 213 SOLUTION TOPICAL at 05:34

## 2022-11-17 NOTE — PROGRESS NOTE ADULT - ATTENDING COMMENTS
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 Unknown how long pt unresponsive for at home. Blood sugar in the 40s with EMS s/p glucagon. On arrival to ER pt hypothermic and agonally breathing. Pt became unresponsive with loss of pulse; ACLS initiated. PEA arrest with ROSC after approximately 5 min s/p Epi x2.  Pt intubated during CODE BLUE. Per son pt on the day prior to presentation reported low blood sugar reads on his glucometer in the range of 80s. Son believes pt continued to take his insulin and oral diabetic regimen. Son states that pt was eating but may have been eating less in the last few days. Later on pt noted to have tonic-clonic Sz. ICU dx: 1) Hypoglycemia 2) PEA arrest 3) Takotsubo cardiomyopathy found on Echo 4) New onset Sz either 2 to hypoglycemia vs anoxic encephalopathy  5) ELMER 2 to ATN 6) Shock liver     Resp: Cont current vent settings  ID: Dc Zosyn  CVS: Hypotension requiring Levophed to maintain MAP >65 likely 2 to postarrest myocardial dysfxn  Heme: DVT prophylaxis with sq Heparin   FEN: Cont enteral feeds  Endo: Follow Glu  GI: Trend LFTs  Renal: Follow BUN/Cr and UO  Neuro: Cont neuro checks off sedation/ EEG noted/ Cont Keppra and Vimpat/ Pt discussed with Neuro  Social: Son at bedside and explained he will have 2 options: 1) Trach/PEG chronic care facility vs 2) Palliative extubation

## 2022-11-17 NOTE — CHART NOTE - NSCHARTNOTEFT_GEN_A_CORE
To Whom It May Concern,      BRANDON CAMARILLO was admitted to [facility] system on 11/11/22 and remains in ICU. Please permit Meaghan Benton Vani, Vania to visit.     If any further questions are required please contact us.       Thanks,  Reggie CHADWICKCNP  Jeff Guzman MD  Critical Care Medicine  [address] [place] [id number] [phone]

## 2022-11-17 NOTE — EEG REPORT - NS EEG TEXT BOX
REPORT OF ROUTINE EEG WITH VIDEO  [facility]: [address], [address] [place] [address], Phone: [phone] [facility]: [address] [place] [address], [place] [address], Phone: [phone] Office: [address] [place] [address], [place] [address], Phone: [phone]  Patient Name: BRANDON CAMARILLO   Age: 72 year : 1950 MRN #: -, Location: ICU3 Referring Physician: -  EEG #: [id number] Study Date: 2022   Study Duration: 20.3 		  Technical Information:					 On Instrument: [id number] Placement and Labeling of Electrodes: The EEG was performed utilizing 20 channels referential EEG connections (coronal over temporal over parasagittal montage) using all standard 10-20 electrode placements with EKG.  Recording was at a sampling rate of 256 samples per second per channel.  Time synchronized digital video recording was done simultaneously with EEG recording.  A low light infrared camera was used for low light recording.  Levy and seizure detection algorithms were utilized. CSA Technical Component: Quantitative EEG analysis using a separate Compressed Spectral Array (CSA) software package was conducted in real-time and run at bedside after set up by the technician, digitally displaying the power of electrographic frequencies included in the 1-30Hz band using a graded color map.  This data was reviewed and interpreted independently, and is reported in a separate section below.  History:  EEG/VIDEO PERFORMED BY BEDSIDE COR ASLEEP ON VENT INTUBATED NO HV PT UNRESPONSIVE PHOTIC DONE R/O SEIZURES  73 Y/O MALE WITH PMH LACUNAR UN ARCT, HTN, COPD, DMll HYPOGLYMECIA/ CARDIAC ARREST  Medication No Data.  [Abbreviation Key:  PDR=alpha rhythm/posterior dominant rhythm. A-P=anterior posterior.  Amplitude: ‘very low’:<20; ‘low’:20-49; ‘medium’:; ‘high’:>150uV.  Persistence for periodic/rhythmic patterns (% of epoch) ‘rare’:<1%; ‘occasional’:1-10%; ‘frequent’:10-50%; ‘abundant’:50-90%; ‘continuous’:>90%.  Persistence for sporadic discharges: ‘rare’:<1/hr; ‘occasional’:1/min-1/hr; ‘frequent’:>1/min; ‘abundant’:>1/10 sec.  RPP=rhythmic and periodic patterns; GRDA=generalized rhythmic delta activity; FIRDA=frontal intermittent GRDA; LRDA=lateralized rhythmic delta activity; TIRDA=temporal intermittent rhythmic delta activity;  LPD=PLED=lateralized periodic discharges; GPD=generalized periodic discharges; BIPDs =bilateral independent periodic discharges; Mf=multifocal; SIRPDs=stimulus induced rhythmic, periodic, or ictal appearing discharges; BIRDs=brief potentially ictal rhythmic discharges >4 Hz, lasting .5-10s; PFA (paroxysmal bursts >13 Hz or =8 Hz <10s).  Modifiers: +F=with fast component; +S=with spike component; +R=with rhythmic component.  S-B=burst suppression pattern.  Max=maximal. N1-drowsy; N2-stage II sleep; N3-slow wave sleep. SSS/BETS=small sharp spikes/benign epileptiform transients of sleep. HV=hyperventilation; PS=photic stimulation]  Study Interpretation:  FINDINGS:    Background: Symmetry: symmetric  Continuous: continuous PDR: absent  Reactivity: absent  Voltage: normal, mostly 20-150uV Anterior Posterior Gradient: absent  Breach: absent  Background Slowing: Generalized slowing: diffuse theta and delta activity present  Focal slowing:  none  State Changes:  Absent  Rhythmic and Periodic Patterns (RPPs): Abundant low voltage spike-wave discharges predominantly over the central regions, at times generalized  Occasional LPDs over the central region up to 2Hz   Occasional GDPs, up to 1 Hz  Near continuos GRDA up to 1 Hz  Electrographic and Electroclinical seizures: no seizures recorded   Other Clinical Events: None  Activation Procedures:  -Hyperventilation was not performed.   -Photic stimulation was performed and did not elicit any abnormalities.    Artifacts: Intermittent myogenic and movement artifacts were noted.  ECG: ECG lead off   EEG Classification / Summary:  Abnormal EEG awake / drowsy / sleep 1. Levy-wave discharges, central region, at times generalized  2. LPDs, central region up to 2Hz  3. GDPs up to 1 Hz 4. GRDA up to 1 Hz 5. Background slowing, severe, generalized   Clinical Impression: Findings indicate:  1. High risk of seizures from the central regions, in addition to high risk for generalized seizures, with evidence of structural/functional abnormality. 2. Generalized periodic discharges is typically seen in the setting of a severe encephalopathy, (differential may include metabolic, hypoxic, increased ICP/hydrocephalus, or midline lesions) and findings may represent a highly epileptogenic pattern in certain clinical scenarios (typically with discharges at >2hz rate). 3. Severe diffuse/multifocal cerebral dysfunction, not specific as to etiology.  Preliminary report   Jerome Arias MD Epilepsy Fellow , [facility] Department of Neurology, [facility] EEG Reading Room Ph#: [phone] Epilepsy Answering Service after 5PM and before 8:30AM: Ph#: [phone]    REPORT OF ROUTINE EEG WITH VIDEO  [facility]: [address] [place] [address], Phone: [phone] [facility]: [address] [place] [address], [place] [address], Phone: [phone] Office: [address] [place] [address], [place] [address], Phone: [phone]  Patient Name: BRANDON CAMARILLO   Age: 72 year : 1950 MRN #: -, Location: ICU3 Referring Physician: -  EEG #: [id number] Study Date: 2022   Study Duration: 20.3 		  Technical Information:					 On Instrument: [id number] Placement and Labeling of Electrodes: The EEG was performed utilizing 20 channels referential EEG connections (coronal over temporal over parasagittal montage) using all standard 10-20 electrode placements with EKG.  Recording was at a sampling rate of 256 samples per second per channel.  Time synchronized digital video recording was done simultaneously with EEG recording.  A low light infrared camera was used for low light recording.  Levy and seizure detection algorithms were utilized. CSA Technical Component: Quantitative EEG analysis using a separate Compressed Spectral Array (CSA) software package was conducted in real-time and run at bedside after set up by the technician, digitally displaying the power of electrographic frequencies included in the 1-30Hz band using a graded color map.  This data was reviewed and interpreted independently, and is reported in a separate section below.  History:  EEG/VIDEO PERFORMED BY BEDSIDE COR ASLEEP ON VENT INTUBATED NO HV PT UNRESPONSIVE PHOTIC DONE R/O SEIZURES  71 Y/O MALE WITH PMH LACUNAR UN ARCT, HTN, COPD, DMll HYPOGLYMECIA/ CARDIAC ARREST  Medication No Data.  [Abbreviation Key:  PDR=alpha rhythm/posterior dominant rhythm. A-P=anterior posterior.  Amplitude: ‘very low’:<20; ‘low’:20-49; ‘medium’:; ‘high’:>150uV.  Persistence for periodic/rhythmic patterns (% of epoch) ‘rare’:<1%; ‘occasional’:1-10%; ‘frequent’:10-50%; ‘abundant’:50-90%; ‘continuous’:>90%.  Persistence for sporadic discharges: ‘rare’:<1/hr; ‘occasional’:1/min-1/hr; ‘frequent’:>1/min; ‘abundant’:>1/10 sec.  RPP=rhythmic and periodic patterns; GRDA=generalized rhythmic delta activity; FIRDA=frontal intermittent GRDA; LRDA=lateralized rhythmic delta activity; TIRDA=temporal intermittent rhythmic delta activity;  LPD=PLED=lateralized periodic discharges; GPD=generalized periodic discharges; BIPDs =bilateral independent periodic discharges; Mf=multifocal; SIRPDs=stimulus induced rhythmic, periodic, or ictal appearing discharges; BIRDs=brief potentially ictal rhythmic discharges >4 Hz, lasting .5-10s; PFA (paroxysmal bursts >13 Hz or =8 Hz <10s).  Modifiers: +F=with fast component; +S=with spike component; +R=with rhythmic component.  S-B=burst suppression pattern.  Max=maximal. N1-drowsy; N2-stage II sleep; N3-slow wave sleep. SSS/BETS=small sharp spikes/benign epileptiform transients of sleep. HV=hyperventilation; PS=photic stimulation]  Study Interpretation:  FINDINGS:    Background: Symmetry: symmetric  Continuous: continuous PDR: absent  Reactivity: absent  Voltage: normal, mostly 20-150uV Anterior Posterior Gradient: absent  Breach: absent  Background Slowing: Generalized slowing: diffuse theta and delta activity present  Focal slowing:  none  State Changes:  Absent  Rhythmic and Periodic Patterns (RPPs): Abundant low voltage spike-wave discharges predominantly over the central regions, at times generalized  Occasional GDPs, up to 1 Hz  Near continuos GRDA up to 1 Hz  Electrographic and Electroclinical seizures: no seizures recorded   Other Clinical Events: None  Activation Procedures:  -Hyperventilation was not performed.   -Photic stimulation was performed and did not elicit any abnormalities.    Artifacts: Intermittent myogenic and movement artifacts were noted.  ECG: ECG lead off   EEG Classification / Summary:  Abnormal EEG awake / drowsy / sleep 1. Levy-wave discharges, central region, at times generalized  3. GPDs up to 1 Hz 4. GRDA up to 1 Hz 5. Background slowing, moderately severe, generalized   Clinical Impression: Findings indicate:  1. High risk of seizures from the central regions, in addition to high risk for generalized seizures, with evidence of structural/functional abnormality. 2. Generalized periodic discharges is typically seen in the setting of a severe encephalopathy, (differential may include metabolic, hypoxic, increased ICP/hydrocephalus, or midline lesions) and findings may represent a highly epileptogenic pattern in certain clinical scenarios (typically with discharges at >2hz rate). 3. Moderately severe diffuse/multifocal cerebral dysfunction, not specific as to etiology.  Jerome Arias MD Epilepsy Fellow , [facility] Department of Neurology, [facility]  Marcell Chavez MD PhD Director, Epilepsy Division, [facility] EEG Reading Room Ph#: [phone] Epilepsy Answering Service after 5PM and before 8:30AM: Ph#: [phone]

## 2022-11-17 NOTE — CONSULT NOTE ADULT - SUBJECTIVE AND OBJECTIVE BOX
Patient is a 72y old  Male who presents with a chief complaint of s/p cardiac arrest, hypoglycemia (17 Nov 2022 13:47)      CC: cardiac arrest    HPI:  Mr. Hamilton is a 72 year old male with a PMH of lacunar infarct, CHF (EF ~40% per son), CAD s/p PCI with stent and CABGx3 (~2020), s/p PPM (medtronic), HTN, COPD, DMII on insulin and PO meds, BPH, and CKD (stage II?) presenting to ICU s/p cardiac arrest in ED. Per patient son he has recently been doing well and in his normal state of health, however, yesterday Mr. Hamilton started c/o low blood sugars on glucometer. Pt reportedly continued to take his home medications, including both oral antidiabetic agents and insulin (both long and short acting). Today his son found him minimally responsive hanging off his bed, checked his glucose and it read "low," so he called 911. In ED pt was noted to be hypothermic with glucose noted to be 70 just prior to PEA arrest. Pt was coded for ~5 minutes with 2 epi given with ROSC. Pt intubated at that time. is moving all extremities equally and with reactive, equal pupils. He remained hypothermic and is not requiring vasopressor support. Pending CT Scans. ROS otherwise negative per son besides hypoglycemia, with no known sick contacts. Pt accepted to ICU for further management / care.  (14 Nov 2022 01:33)     EEG (11/15/22): frequent SW in right centroparietal region, and 1 focal seizure with secondary generalization recorded (GTC on video)    Seizure recorded while on Keppra, recommended that Keppra be increased to 1500mg BID and load with Vimpat 300mg x 1 followed by 100mg q12    EEG (11/17/22): severe generalized slowing, GPDs up to 1 hz, SW in right central region, no seizures recorded    Now off of propofol, no clinical seizures reported    PAST MEDICAL & SURGICAL HISTORY:  HTN (hypertension)  HLD (hyperlipidemia)  Diabetes mellitus  Lacunar infarction  BPH (benign prostatic hyperplasia)  CHF (congestive heart failure)  Chronic obstructive pulmonary disease, unspecified COPD type  S/P CABG (coronary artery bypass graft)  2014      FAMILY HISTORY: unremarkable      Social Hx:  Nonsmoker, no drug or alcohol use    MEDICATIONS  (STANDING):  aspirin  chewable 81 milliGRAM(s) Oral daily  chlorhexidine 0.12% Liquid 15 milliLiter(s) Oral Mucosa every 12 hours  chlorhexidine 2% Cloths 1 Application(s) Topical <User Schedule>  heparin   Injectable 5000 Unit(s) SubCutaneous every 8 hours  lacosamide 200 milliGRAM(s) Oral two times a day  levETIRAcetam 1500 milliGRAM(s) Oral two times a day  norepinephrine Infusion 0.05 MICROgram(s)/kG/Min (8.08 mL/Hr) IV Continuous <Continuous>  pantoprazole  Injectable 40 milliGRAM(s) IV Push daily  propofol Infusion 10 MICROgram(s)/kG/Min (5.17 mL/Hr) IV Continuous <Continuous>     Allergies  No Known Allergies    ROS: Pertinent positives in HPI, all other ROS were reviewed and are negative.      Vital Signs Last 24 Hrs  T(C): 37.8 (17 Nov 2022 19:30), Max: 38 (17 Nov 2022 16:00)  T(F): 100.1 (17 Nov 2022 19:30), Max: 100.4 (17 Nov 2022 16:00)  HR: 100 (17 Nov 2022 21:16) (88 - 104)  BP: 141/76 (17 Nov 2022 20:00) (91/62 - 184/94)  BP(mean): 91 (17 Nov 2022 20:00) (60 - 117)  RR: 24 (17 Nov 2022 20:00) (24 - 26)  SpO2: 93% (17 Nov 2022 21:16) (92% - 100%)        Neurological exam:  HF: No response to auditory or noxious stimulation, coughs against the ventilator, breathes over the ventilator  CN: Pupils miotic, sluggishly reactive to light, can doll the eye to the left but not the right, +corneal reflex b/l  Motor: No spontaneous movement or withdrawal from painful stimuli  Sens: no withdrawal to noxious stimuli  Reflexes: depressed throughout      Labs:   11-17    141  |  109<H>  |  24<H>  ----------------------------<  167<H>  3.9   |  18<L>  |  1.57<H>    Ca    8.6      17 Nov 2022 04:30  Phos  3.5     11-17  Mg     2.2     11-17    TPro  6.4  /  Alb  2.3<L>  /  TBili  0.6  /  DBili  x   /  AST  19  /  ALT  58  /  AlkPhos  51  11-17                              13.0   7.15  )-----------( 196      ( 17 Nov 2022 04:30 )             39.8           A/P:   73 yo man with cardiac arrest preceded by severe hypoglycemia.  Exam suggestive of anoxic brain injury.  First day off sedation.    Plan:  1. Monitor off sedation, and repeat neuro exam to evaluate for signs of wakefulness  2. Continue Keppra 1500mg IV q12 and Vimpat 100mg IV q12  3. Supportive care    Mark Loo MD  Neurology Attending Physician               Patient is a 72y old  Male who presents with a chief complaint of s/p cardiac arrest, hypoglycemia (17 Nov 2022 13:47)      CC: cardiac arrest    HPI:  Mr. Hamilton is a 72 year old male with a PMH of lacunar infarct, CHF (EF ~40% per son), CAD s/p PCI with stent and CABGx3 (~2020), s/p PPM (medtronic), HTN, COPD, DMII on insulin and PO meds, BPH, and CKD (stage II?) presenting to ICU s/p cardiac arrest in ED. Per patient son he has recently been doing well and in his normal state of health, however, yesterday Mr. Hamilton started c/o low blood sugars on glucometer. Pt reportedly continued to take his home medications, including both oral antidiabetic agents and insulin (both long and short acting). Today his son found him minimally responsive hanging off his bed, checked his glucose and it read "low," so he called 911. In ED pt was noted to be hypothermic with glucose noted to be 70 just prior to PEA arrest. Pt was coded for ~5 minutes with 2 epi given with ROSC. Pt intubated at that time. is moving all extremities equally and with reactive, equal pupils. He remained hypothermic and is not requiring vasopressor support. Pending CT Scans. ROS otherwise negative per son besides hypoglycemia, with no known sick contacts. Pt accepted to ICU for further management / care.  (14 Nov 2022 01:33)     EEG (11/15/22): frequent SW in right centroparietal region, and 1 focal seizure with secondary generalization recorded (GTC on video)    Seizure recorded while on Keppra, recommended that Keppra be increased to 1500mg BID and load with Vimpat 300mg x 1 followed by 100mg q12    EEG (11/17/22): severe generalized slowing, GPDs up to 1 hz, SW in right central region, no seizures recorded    Now off of propofol, no clinical seizures reported    PAST MEDICAL & SURGICAL HISTORY:  HTN (hypertension)  HLD (hyperlipidemia)  Diabetes mellitus  Lacunar infarction  BPH (benign prostatic hyperplasia)  CHF (congestive heart failure)  Chronic obstructive pulmonary disease, unspecified COPD type  S/P CABG (coronary artery bypass graft)  2014      FAMILY HISTORY: unremarkable      Social Hx:  Nonsmoker, no drug or alcohol use    MEDICATIONS  (STANDING):  aspirin  chewable 81 milliGRAM(s) Oral daily  chlorhexidine 0.12% Liquid 15 milliLiter(s) Oral Mucosa every 12 hours  chlorhexidine 2% Cloths 1 Application(s) Topical <User Schedule>  heparin   Injectable 5000 Unit(s) SubCutaneous every 8 hours  lacosamide 200 milliGRAM(s) Oral two times a day  levETIRAcetam 1500 milliGRAM(s) Oral two times a day  norepinephrine Infusion 0.05 MICROgram(s)/kG/Min (8.08 mL/Hr) IV Continuous <Continuous>  pantoprazole  Injectable 40 milliGRAM(s) IV Push daily  propofol Infusion 10 MICROgram(s)/kG/Min (5.17 mL/Hr) IV Continuous <Continuous>     Allergies  No Known Allergies    ROS: Pertinent positives in HPI, all other ROS were reviewed and are negative.      Vital Signs Last 24 Hrs  T(C): 37.8 (17 Nov 2022 19:30), Max: 38 (17 Nov 2022 16:00)  T(F): 100.1 (17 Nov 2022 19:30), Max: 100.4 (17 Nov 2022 16:00)  HR: 100 (17 Nov 2022 21:16) (88 - 104)  BP: 141/76 (17 Nov 2022 20:00) (91/62 - 184/94)  BP(mean): 91 (17 Nov 2022 20:00) (60 - 117)  RR: 24 (17 Nov 2022 20:00) (24 - 26)  SpO2: 93% (17 Nov 2022 21:16) (92% - 100%)        Neurological exam:  HF: No response to auditory or noxious stimulation, coughs against the ventilator, breathes over the ventilator  CN: Pupils miotic, sluggishly reactive to light, can doll the eye to the left but not the right, +corneal reflex b/l  Motor: No spontaneous movement or withdrawal from painful stimuli  Sens: no withdrawal to noxious stimuli  Reflexes: depressed throughout      Labs:   11-17    141  |  109<H>  |  24<H>  ----------------------------<  167<H>  3.9   |  18<L>  |  1.57<H>    Ca    8.6      17 Nov 2022 04:30  Phos  3.5     11-17  Mg     2.2     11-17    TPro  6.4  /  Alb  2.3<L>  /  TBili  0.6  /  DBili  x   /  AST  19  /  ALT  58  /  AlkPhos  51  11-17                              13.0   7.15  )-----------( 196      ( 17 Nov 2022 04:30 )             39.8           A/P:   73 yo man with cardiac arrest preceded by severe hypoglycemia.  Exam suggestive of anoxic brain injury.  First day off sedation.  Brainstem reflexes intact, but no signs of higher cortical function.    Plan:  1. Monitor off sedation, and repeat neuro exam to evaluate for signs of wakefulness  2. Continue Keppra 1500mg IV q12 and Vimpat 100mg IV q12  3. Supportive care    Mark Loo MD  Neurology Attending Physician

## 2022-11-17 NOTE — PROGRESS NOTE ADULT - ASSESSMENT
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 Unknown how long pt unresponsive for at home. Blood sugar in the 40s with EMS s/p glucagon. On arrival to ER pt hypothermic and agonally breathing. Pt became unresponsive with loss of pulse; ACLS initiated. PEA arrest with ROSC after approximately 5 min s/p Epi x2.  Pt intubated during CODE BLUE. Per son pt on the day prior to presentation reported low blood sugar reads on his glucometer in the range of 80s. Son believes pt continued to take his insulin and oral diabetic regimen. Son states that pt was eating but may have been eating less in the last few days. Later on pt noted to have tonic-clonic Sz. ICU dx: 1) Hypoglycemia 2) PEA arrest 3) Takotsubo cardiomyopathy found on Echo 4) New onset Sz either 2 to hypoglycemia vs anoxic encephalopathy    5) ELMER 2 to ATN 6) Shock liver       Resp: Cont current vent settings, wean as tolerated  ID: afebrile, no objective signs of infection, will hold ABx for now  CVS: Hypotension requiring Levophed to maintain MAP >65 likely 2 to postarrest myocardial dysfxn  Heme: DVT prophylaxis with sq Heparin   FEN: Cont enteral feeds  Endo: Follow Glu, ISS   GI: Trend LFTs  Renal: Follow BUN/Cr and UO  Neuro: Cont neuro checks/ Increase Keppra and Vimpat as per Neuro, sedation vecation to eval for neuro status.

## 2022-11-17 NOTE — PROGRESS NOTE ADULT - SUBJECTIVE AND OBJECTIVE BOX
Patient is a 72y old  Male who presents with a chief complaint of s/p cardiac arrest, hypoglycemia (16 Nov 2022 20:34)      24 hour events: no seizures overnight, vimpat added, c/w keppra,      HPI:  Mr. Hamilton is a 72 year old male with a PMH of lacunar infarct, CHF (EF ~40% per son), CAD s/p PCI with stent and CABGx3 (~2020), s/p PPM (medtronic), HTN, COPD, DMII on insulin and PO meds, BPH, and CKD (stage II?) presenting to ICU s/p cardiac arrest in ED. Per patient son he has recently been doing well and in his normal state of health, however, yesterday Mr. Hamilton started c/o low blood sugars on glucometer. Pt reportedly continued to take his home medications, including both oral antidiabetic agents and insulin (both long and short acting). Today his son found him minimally responsive hanging off his bed, checked his glucose and it read "low," so he called 911. In ED pt was noted to be hypothermic with glucose noted to be 70 just prior to PEA arrest. Pt was coded for ~5 minutes with 2 epi given with ROSC. Pt intubated at that time. is moving all extremities equally and with reactive, equal pupils. He remained hypothermic and is not requiring vasopressor support. Pending CT Scans. ROS otherwise negative per son besides hypoglycemia, with no known sick contacts. Pt accepted to ICU for further management / care.  (14 Nov 2022 01:33)      REVIEW OF SYSTEMS:    Intubated and Sedated    OBJECTIVE:  ICU Vital Signs Last 24 Hrs  T(C): 37.8 (17 Nov 2022 12:30), Max: 37.8 (17 Nov 2022 12:30)  T(F): 100 (17 Nov 2022 12:30), Max: 100 (17 Nov 2022 12:30)  HR: 98 (17 Nov 2022 13:30) (88 - 104)  BP: 92/52 (17 Nov 2022 13:30) (91/62 - 184/94)  BP(mean): 62 (17 Nov 2022 13:30) (60 - 117)  ABP: --  ABP(mean): --  RR: 24 (17 Nov 2022 13:30) (24 - 26)  SpO2: 98% (17 Nov 2022 13:30) (94% - 100%)      Mode: AC/ CMV (Assist Control/ Continuous Mandatory Ventilation), RR (machine): 24, TV (machine): 450, FiO2: 50, PEEP: 5, ITime: 1, MAP: 11, PIP: 26    11-16 @ 07:01  -  11-17 @ 07:00  --------------------------------------------------------  IN: 1263.4 mL / OUT: 1295 mL / NET: -31.6 mL    11-17 @ 07:01 - 11-17 @ 13:48  --------------------------------------------------------  IN: 130.6 mL / OUT: 275 mL / NET: -144.4 mL      CAPILLARY BLOOD GLUCOSE      POCT Blood Glucose.: 173 mg/dL (17 Nov 2022 12:53)      PHYSICAL EXAM:  Gen: lying comfortably in bed in no apparent distress  Mouth: (+) ETT  Lungs: CTA  Heart: Irregular  Abd: Soft/+BS/ Non-tender  Ext: UE edema  Neuro: Sedation held; pupils sluggish, (+) spont resp, L foot jerking movements        HOSPITAL MEDICATIONS:  MEDICATIONS  (STANDING):  aspirin  chewable 81 milliGRAM(s) Oral daily  chlorhexidine 0.12% Liquid 15 milliLiter(s) Oral Mucosa every 12 hours  chlorhexidine 2% Cloths 1 Application(s) Topical <User Schedule>  heparin   Injectable 5000 Unit(s) SubCutaneous every 8 hours  lacosamide 200 milliGRAM(s) Oral two times a day  levETIRAcetam 1500 milliGRAM(s) Oral two times a day  norepinephrine Infusion 0.05 MICROgram(s)/kG/Min (8.08 mL/Hr) IV Continuous <Continuous>  pantoprazole  Injectable 40 milliGRAM(s) IV Push daily  propofol Infusion 10 MICROgram(s)/kG/Min (5.17 mL/Hr) IV Continuous <Continuous>    MEDICATIONS  (PRN):  fentaNYL    Injectable 50 MICROGram(s) IV Push every 6 hours PRN Moderate Pain (4 - 6)  midazolam Injectable 2 milliGRAM(s) IV Push every 1 hour PRN agitation/seizure      LABS:                        13.0   7.15  )-----------( 196      ( 17 Nov 2022 04:30 )             39.8     Hgb Trend: 13.0<--, 12.9<--, 13.5<--, 13.3<--, 15.1<--  11-17    141  |  109<H>  |  24<H>  ----------------------------<  167<H>  3.9   |  18<L>  |  1.57<H>    Ca    8.6      17 Nov 2022 04:30  Phos  3.5     11-17  Mg     2.2     11-17    TPro  6.4  /  Alb  2.3<L>  /  TBili  0.6  /  DBili  x   /  AST  19  /  ALT  58  /  AlkPhos  51  11-17    Creatinine Trend: 1.57<--, 1.50<--, 1.94<--, 1.58<--, 1.12<--, 1.34<--                EKG:    MICROBIOLOGY:       I/O:  I&O's Detail    16 Nov 2022 07:01  -  17 Nov 2022 07:00  --------------------------------------------------------  IN:    FentaNYL: 51.6 mL    IV PiggyBack: 500 mL    Norepinephrine: 199.2 mL    Propofol: 512.6 mL  Total IN: 1263.4 mL    OUT:    Indwelling Catheter - Urethral (mL): 1295 mL  Total OUT: 1295 mL    Total NET: -31.6 mL      17 Nov 2022 07:01  -  17 Nov 2022 13:48  --------------------------------------------------------  IN:    Norepinephrine: 45.3 mL    Propofol: 85.3 mL  Total IN: 130.6 mL    OUT:    FentaNYL: 0 mL    Indwelling Catheter - Urethral (mL): 275 mL  Total OUT: 275 mL    Total NET: -144.4 mL          I&O's Summary    16 Nov 2022 07:01  -  17 Nov 2022 07:00  --------------------------------------------------------  IN: 1263.4 mL / OUT: 1295 mL / NET: -31.6 mL    17 Nov 2022 07:01  -  17 Nov 2022 13:48  --------------------------------------------------------  IN: 130.6 mL / OUT: 275 mL / NET: -144.4 mL        RADIOLOGY:      EKG:  MICROBIOLOGY:     Radiology: reviewed        GLOBAL ISSUE/BEST PRACTICE:    Sedation: propofol  HOB elevation: yes  Stress ulcer prophylaxis: protonix  VTE prophylaxis: heparin  Glycemic control: ISS  Nutrition: trickle feeds    CODE STATUS: Full  [facility] discussion: Y

## 2022-11-18 LAB
ALBUMIN SERPL ELPH-MCNC: 2.3 G/DL — LOW (ref 3.3–5)
ALP SERPL-CCNC: 64 U/L — SIGNIFICANT CHANGE UP (ref 40–120)
ALT FLD-CCNC: 49 U/L — SIGNIFICANT CHANGE UP (ref 12–78)
ANION GAP SERPL CALC-SCNC: 12 MMOL/L — SIGNIFICANT CHANGE UP (ref 5–17)
AST SERPL-CCNC: 19 U/L — SIGNIFICANT CHANGE UP (ref 15–37)
BASOPHILS # BLD AUTO: 0.05 K/UL — SIGNIFICANT CHANGE UP (ref 0–0.2)
BASOPHILS NFR BLD AUTO: 0.7 % — SIGNIFICANT CHANGE UP (ref 0–2)
BILIRUB SERPL-MCNC: 0.7 MG/DL — SIGNIFICANT CHANGE UP (ref 0.2–1.2)
BUN SERPL-MCNC: 32 MG/DL — HIGH (ref 7–23)
CALCIUM SERPL-MCNC: 9 MG/DL — SIGNIFICANT CHANGE UP (ref 8.5–10.1)
CHLORIDE SERPL-SCNC: 112 MMOL/L — HIGH (ref 96–108)
CO2 SERPL-SCNC: 19 MMOL/L — LOW (ref 22–31)
CREAT SERPL-MCNC: 1.76 MG/DL — HIGH (ref 0.5–1.3)
CULTURE RESULTS: SIGNIFICANT CHANGE UP
EGFR: 40 ML/MIN/1.73M2 — LOW
EOSINOPHIL # BLD AUTO: 0.57 K/UL — HIGH (ref 0–0.5)
EOSINOPHIL NFR BLD AUTO: 7.9 % — HIGH (ref 0–6)
GLUCOSE BLDC GLUCOMTR-MCNC: 317 MG/DL — HIGH (ref 70–99)
GLUCOSE BLDC GLUCOMTR-MCNC: 334 MG/DL — HIGH (ref 70–99)
GLUCOSE BLDC GLUCOMTR-MCNC: 337 MG/DL — HIGH (ref 70–99)
GLUCOSE BLDC GLUCOMTR-MCNC: 91 MG/DL — SIGNIFICANT CHANGE UP (ref 70–99)
GLUCOSE SERPL-MCNC: 264 MG/DL — HIGH (ref 70–99)
HCT VFR BLD CALC: 42.6 % — SIGNIFICANT CHANGE UP (ref 39–50)
HGB BLD-MCNC: 13.7 G/DL — SIGNIFICANT CHANGE UP (ref 13–17)
IMM GRANULOCYTES NFR BLD AUTO: 0.6 % — SIGNIFICANT CHANGE UP (ref 0–0.9)
LYMPHOCYTES # BLD AUTO: 1.25 K/UL — SIGNIFICANT CHANGE UP (ref 1–3.3)
LYMPHOCYTES # BLD AUTO: 17.4 % — SIGNIFICANT CHANGE UP (ref 13–44)
MAGNESIUM SERPL-MCNC: 2.6 MG/DL — SIGNIFICANT CHANGE UP (ref 1.6–2.6)
MCHC RBC-ENTMCNC: 30.5 PG — SIGNIFICANT CHANGE UP (ref 27–34)
MCHC RBC-ENTMCNC: 32.2 G/DL — SIGNIFICANT CHANGE UP (ref 32–36)
MCV RBC AUTO: 94.9 FL — SIGNIFICANT CHANGE UP (ref 80–100)
MONOCYTES # BLD AUTO: 0.84 K/UL — SIGNIFICANT CHANGE UP (ref 0–0.9)
MONOCYTES NFR BLD AUTO: 11.7 % — SIGNIFICANT CHANGE UP (ref 2–14)
NEUTROPHILS # BLD AUTO: 4.45 K/UL — SIGNIFICANT CHANGE UP (ref 1.8–7.4)
NEUTROPHILS NFR BLD AUTO: 61.7 % — SIGNIFICANT CHANGE UP (ref 43–77)
NRBC # BLD: 0 /100 WBCS — SIGNIFICANT CHANGE UP (ref 0–0)
PHOSPHATE SERPL-MCNC: 3.6 MG/DL — SIGNIFICANT CHANGE UP (ref 2.5–4.5)
PLATELET # BLD AUTO: 232 K/UL — SIGNIFICANT CHANGE UP (ref 150–400)
POTASSIUM SERPL-MCNC: 4.4 MMOL/L — SIGNIFICANT CHANGE UP (ref 3.5–5.3)
POTASSIUM SERPL-SCNC: 4.4 MMOL/L — SIGNIFICANT CHANGE UP (ref 3.5–5.3)
PROT SERPL-MCNC: 6.9 GM/DL — SIGNIFICANT CHANGE UP (ref 6–8.3)
RBC # BLD: 4.49 M/UL — SIGNIFICANT CHANGE UP (ref 4.2–5.8)
RBC # FLD: 14.3 % — SIGNIFICANT CHANGE UP (ref 10.3–14.5)
SODIUM SERPL-SCNC: 143 MMOL/L — SIGNIFICANT CHANGE UP (ref 135–145)
WBC # BLD: 7.2 K/UL — SIGNIFICANT CHANGE UP (ref 3.8–10.5)
WBC # FLD AUTO: 7.2 K/UL — SIGNIFICANT CHANGE UP (ref 3.8–10.5)

## 2022-11-18 PROCEDURE — 95816 EEG AWAKE AND DROWSY: CPT | Mod: 26

## 2022-11-18 PROCEDURE — 99233 SBSQ HOSP IP/OBS HIGH 50: CPT

## 2022-11-18 PROCEDURE — 99291 CRITICAL CARE FIRST HOUR: CPT | Mod: GC

## 2022-11-18 RX ORDER — LACOSAMIDE 50 MG/1
150 TABLET ORAL
Refills: 0 | Status: DISCONTINUED | OUTPATIENT
Start: 2022-11-18 | End: 2022-11-19

## 2022-11-18 RX ORDER — INSULIN LISPRO 100/ML
VIAL (ML) SUBCUTANEOUS EVERY 6 HOURS
Refills: 0 | Status: DISCONTINUED | OUTPATIENT
Start: 2022-11-18 | End: 2022-11-19

## 2022-11-18 RX ORDER — INSULIN GLARGINE 100 [IU]/ML
10 INJECTION, SOLUTION SUBCUTANEOUS AT BEDTIME
Refills: 0 | Status: DISCONTINUED | OUTPATIENT
Start: 2022-11-18 | End: 2022-11-19

## 2022-11-18 RX ORDER — ACETAMINOPHEN 500 MG
650 TABLET ORAL ONCE
Refills: 0 | Status: COMPLETED | OUTPATIENT
Start: 2022-11-18 | End: 2022-11-18

## 2022-11-18 RX ADMIN — LEVETIRACETAM 1500 MILLIGRAM(S): 250 TABLET, FILM COATED ORAL at 06:41

## 2022-11-18 RX ADMIN — CHLORHEXIDINE GLUCONATE 15 MILLILITER(S): 213 SOLUTION TOPICAL at 17:27

## 2022-11-18 RX ADMIN — Medication 4: at 23:03

## 2022-11-18 RX ADMIN — Medication 81 MILLIGRAM(S): at 13:07

## 2022-11-18 RX ADMIN — LEVETIRACETAM 1500 MILLIGRAM(S): 250 TABLET, FILM COATED ORAL at 17:28

## 2022-11-18 RX ADMIN — HEPARIN SODIUM 5000 UNIT(S): 5000 INJECTION INTRAVENOUS; SUBCUTANEOUS at 23:02

## 2022-11-18 RX ADMIN — INSULIN GLARGINE 10 UNIT(S): 100 INJECTION, SOLUTION SUBCUTANEOUS at 23:02

## 2022-11-18 RX ADMIN — HEPARIN SODIUM 5000 UNIT(S): 5000 INJECTION INTRAVENOUS; SUBCUTANEOUS at 13:07

## 2022-11-18 RX ADMIN — HEPARIN SODIUM 5000 UNIT(S): 5000 INJECTION INTRAVENOUS; SUBCUTANEOUS at 06:40

## 2022-11-18 RX ADMIN — CHLORHEXIDINE GLUCONATE 15 MILLILITER(S): 213 SOLUTION TOPICAL at 20:00

## 2022-11-18 RX ADMIN — Medication 4: at 17:16

## 2022-11-18 RX ADMIN — CHLORHEXIDINE GLUCONATE 15 MILLILITER(S): 213 SOLUTION TOPICAL at 06:40

## 2022-11-18 RX ADMIN — LACOSAMIDE 200 MILLIGRAM(S): 50 TABLET ORAL at 06:41

## 2022-11-18 RX ADMIN — LACOSAMIDE 150 MILLIGRAM(S): 50 TABLET ORAL at 17:28

## 2022-11-18 RX ADMIN — CHLORHEXIDINE GLUCONATE 1 APPLICATION(S): 213 SOLUTION TOPICAL at 06:40

## 2022-11-18 NOTE — EEG REPORT - NS EEG TEXT BOX
REPORT OF ROUTINE EEG WITH VIDEO  [facility]: [address], [address] [place] [address], Phone: [phone] [facility]: [phone] [place] [address], [place] [address], Phone: [phone] Office: [address] [place] [address], [place] [address], Phone: [phone]  Patient Name: BRANDON CAMARILLO   Age: 72 year : 1950 MRN #: [id number] Location: ICU3 Referring Physician: -  EEG #: [id number] Study Date: 2022   Study Duration: 20.3 		  Technical Information:					 On Instrument: [id number] Placement and Labeling of Electrodes: The EEG was performed utilizing 20 channels referential EEG connections (coronal over temporal over parasagittal montage) using all standard 10-20 electrode placements with EKG.  Recording was at a sampling rate of 256 samples per second per channel.  Time synchronized digital video recording was done simultaneously with EEG recording.  A low light infrared camera was used for low light recording.  Levy and seizure detection algorithms were utilized. CSA Technical Component: Quantitative EEG analysis using a separate Compressed Spectral Array (CSA) software package was conducted in real-time and run at bedside after set up by the technician, digitally displaying the power of electrographic frequencies included in the 1-30Hz band using a graded color map.  This data was reviewed and interpreted independently, and is reported in a separate section below.  History:  EEG/VIDEO PERFORMED BY BEDSIDE COR ASLEEP ON VENT INTUBATED NO HV PT UNRESPONSIVE PHOTIC DONE R/O SEIZURES  71 Y/O MALE WITH PMH LACUNAR UN ARCT, HTN, COPD, DMll HYPOGLYMECIA/ CARDIAC ARREST  Medication No Data.  [Abbreviation Key:  PDR=alpha rhythm/posterior dominant rhythm. A-P=anterior posterior.  Amplitude: ‘very low’:<20; ‘low’:20-49; ‘medium’:; ‘high’:>150uV.  Persistence for periodic/rhythmic patterns (% of epoch) ‘rare’:<1%; ‘occasional’:1-10%; ‘frequent’:10-50%; ‘abundant’:50-90%; ‘continuous’:>90%.  Persistence for sporadic discharges: ‘rare’:<1/hr; ‘occasional’:1/min-1/hr; ‘frequent’:>1/min; ‘abundant’:>1/10 sec.  RPP=rhythmic and periodic patterns; GRDA=generalized rhythmic delta activity; FIRDA=frontal intermittent GRDA; LRDA=lateralized rhythmic delta activity; TIRDA=temporal intermittent rhythmic delta activity;  LPD=PLED=lateralized periodic discharges; GPD=generalized periodic discharges; BIPDs =bilateral independent periodic discharges; Mf=multifocal; SIRPDs=stimulus induced rhythmic, periodic, or ictal appearing discharges; BIRDs=brief potentially ictal rhythmic discharges >4 Hz, lasting .5-10s; PFA (paroxysmal bursts >13 Hz or =8 Hz <10s).  Modifiers: +F=with fast component; +S=with spike component; +R=with rhythmic component.  S-B=burst suppression pattern.  Max=maximal. N1-drowsy; N2-stage II sleep; N3-slow wave sleep. SSS/BETS=small sharp spikes/benign epileptiform transients of sleep. HV=hyperventilation; PS=photic stimulation]  Study Interpretation:  FINDINGS:    Background: Symmetry: symmetric  Continuous: continuous PDR: absent  Reactivity: absent  Voltage: normal, mostly 20-150uV Anterior Posterior Gradient: absent  Breach: absent  Background Slowing: Generalized slowing: diffuse theta and delta activity present  Focal slowing:  none  State Changes:  Absent  Rhythmic and Periodic Patterns (RPPs): Abundant low voltage spike-wave discharges predominantly over the bilateral frontocentral regions, at times generalized up to 4Hz  Near continuos GRDA up to 1 Hz  Electrographic and Electroclinical seizures: no seizures recorded   Other Clinical Events: None  Activation Procedures:  -Hyperventilation was not performed.   -Photic stimulation was performed and did not elicit any abnormalities.    Artifacts: Intermittent myogenic and movement artifacts were noted.  ECG: ECG lead w significant artifact   EEG Classification / Summary:  Abnormal EEG awake / drowsy / sleep 1. Levy-wave discharges, bilateral frontocentral regions, at times generalized, up to 4Hz 2. GRDA up to 1 Hz 3. Background slowing, moderately severe, generalized   Clinical Impression: Findings indicate:  1. High risk of seizures from the bilateral frontocentral regions, in addition to high risk for generalized seizures, with evidence of structural/functional abnormality. 2. Moderately severe diffuse/multifocal cerebral dysfunction, not specific as to etiology.  Jerome Arias MD Epilepsy Fellow , [facility] Department of Neurology, [facility]  [facility] EEG Reading Room Ph#: [phone] Epilepsy Answering Service after 5PM and before 8:30AM: Ph#: [phone]

## 2022-11-18 NOTE — PROGRESS NOTE ADULT - ASSESSMENT
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 Unknown how long pt unresponsive for at home. Blood sugar in the 40s with EMS s/p glucagon. On arrival to ER pt hypothermic and agonally breathing. Pt became unresponsive with loss of pulse; ACLS initiated. PEA arrest with ROSC after approximately 5 min s/p Epi x2.  Pt intubated during CODE BLUE. Per son pt on the day prior to presentation reported low blood sugar reads on his glucometer in the range of 80s. Son believes pt continued to take his insulin and oral diabetic regimen. Son states that pt was eating but may have been eating less in the last few days. Later on pt noted to have tonic-clonic Sz. ICU dx: 1) Hypoglycemia 2) PEA arrest 3) Takotsubo cardiomyopathy found on Echo 4) New onset Sz either 2 to hypoglycemia vs anoxic encephalopathy    5) [place] 2 to ATN 6) Shock liver       Resp: Cont current vent settings, wean as tolerated  ID: afebrile, no objective signs of infection, will hold ABx for now  CVS: Off pressors  Heme: DVT prophylaxis with sq Heparin   FEN: Cont enteral feeds  Endo: Follow Glu, ISS   GI: Trend LFTs  Renal: Follow BUN/Cr and UO  Neuro: Cont neuro checks/ Increase Keppra and Vimpat as per Neuro, sedation vacation to eval for neuro status. EEG shows GPD, no active seizures 71 yo M with h/o COPD, CAD s/p PCI with stent 2015 and CABG 2014, chronic diastolic heart failure (EF 50%), 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 Unknown how long pt unresponsive for at home. Blood sugar in the 40s with EMS s/p glucagon. On arrival to ER pt hypothermic and agonally breathing. Pt became unresponsive with loss of pulse; ACLS initiated. PEA arrest with ROSC after approximately 5 min s/p Epi x2.  Pt intubated during CODE BLUE. Per son pt on the day prior to presentation reported low blood sugar reads on his glucometer in the range of 80s. Son believes pt continued to take his insulin and oral diabetic regimen. Son states that pt was eating but may have been eating less in the last few days. Later on pt noted to have tonic-clonic Sz. ICU dx: 1) Hypoglycemia 2) PEA arrest 3) Takotsubo cardiomyopathy found on Echo 4) New onset Sz either 2 to hypoglycemia vs anoxic encephalopathy    5) [place] 2 to ATN 6) Shock liver     Resp: Cont current vent settings, wean as tolerated  ID: afebrile, no objective signs of infection, will hold ABx for now  CVS: Off pressors  Heme: DVT prophylaxis with sq Heparin   FEN: Cont enteral feeds  Endo: Follow Glu, ISS   GI: Trend LFTs  Renal: Follow BUN/Cr and UO  Neuro: Cont neuro checks/ Increase Keppra and Vimpat as per Neuro, sedation vacation to eval for neuro status. EEG shows GPD, no active seizures

## 2022-11-18 NOTE — PROGRESS NOTE ADULT - ATTENDING COMMENTS
Agree with above  72M s/p PEA arrest 5' downtime before ROSC  Evidence of GTC, but EEG no seizures at present  c/w AEDs (Keppra / Vimpat)  Off sedation, ?following commands  Remains intubated for mental status, decreased RR now 18/400/40%/+5  c/w feeds  Patient will remain in ICU while intubated    I have personally provided 45 minutes of critical care time.

## 2022-11-18 NOTE — PROGRESS NOTE ADULT - SUBJECTIVE AND OBJECTIVE BOX
HPI:    No significant change in mental status, no seizures reported    Repeat EEG being done today    Vital Signs Last 24 Hrs  T(C): 37.7 (18 Nov 2022 15:45), Max: 37.9 (18 Nov 2022 06:57)  T(F): 99.9 (18 Nov 2022 15:45), Max: 100.2 (18 Nov 2022 06:57)  HR: 98 (18 Nov 2022 16:35) (90 - 104)  BP: 121/72 (18 Nov 2022 14:00) (120/67 - 164/81)  BP(mean): 84 (18 Nov 2022 14:00) (78 - 99)  RR: 21 (18 Nov 2022 14:00) (18 - 27)  SpO2: 95% (18 Nov 2022 16:35) (92% - 99%)        MEDICATIONS  (STANDING):  aspirin  chewable 81 milliGRAM(s) Oral daily  chlorhexidine 0.12% Liquid 15 milliLiter(s) Oral Mucosa every 12 hours  chlorhexidine 2% Cloths 1 Application(s) Topical <User Schedule>  heparin   Injectable 5000 Unit(s) SubCutaneous every 8 hours  insulin lispro (ADMELOG) corrective regimen sliding scale   SubCutaneous every 6 hours  lacosamide 150 milliGRAM(s) Oral two times a day  levETIRAcetam 1500 milliGRAM(s) Oral two times a day    MEDICATIONS  (PRN):  midazolam Injectable 2 milliGRAM(s) IV Push every 1 hour PRN agitation/seizure      ROS: pertinent positives in HPI, all other ROS were reviewed and are negative       Neurological exam:  Neurological exam:  HF: No response to auditory or noxious stimulation, coughs against the ventilator, breathes over the ventilator  CN: Pupils miotic, sluggishly reactive to light, can doll the eye to the left but not the right, +corneal reflex b/l  Motor: No spontaneous movement or withdrawal from painful stimuli  Sens: movement of left toes with stimulation of sole of foot  Reflexes: depressed throughout    LABS:                         13.7   7.20  )-----------( 232      ( 18 Nov 2022 04:10 )             42.6     11-18    143  |  112<H>  |  32<H>  ----------------------------<  264<H>  4.4   |  19<L>  |  1.76<H>    Ca    9.0      18 Nov 2022 04:10  Phos  3.6     11-18  Mg     2.6     11-18    TPro  6.9  /  Alb  2.3<L>  /  TBili  0.7  /  DBili  x   /  AST  19  /  ALT  49  /  AlkPhos  64  11-18    LIVER FUNCTIONS - ( 18 Nov 2022 04:10 )  Alb: 2.3 g/dL / Pro: 6.9 gm/dL / ALK PHOS: 64 U/L / ALT: 49 U/L / AST: 19 U/L / GGT: x           A/P:   73 yo man with cardiac arrest preceded by severe hypoglycemia.  Exam suggestive of anoxic brain injury.    Brainstem reflexes intact, but no signs of higher cortical function.    Plan:  1. Monitor off sedation, and repeat exam to evaluate for signs of wakefulness  2. Continue Keppra 1500mg IV q12 and Vimpat 100mg IV q12  3. Supportive care    Mark Loo MD  Neurology Attending Physician

## 2022-11-18 NOTE — PROGRESS NOTE ADULT - SUBJECTIVE AND OBJECTIVE BOX
24 hr events:  - dc levo  - RR changed to 18  - spot EEG shows GPD, no seizures    SUBJECTIVE:  Pt intubated and sedated      MEDICATIONS  (STANDING):  aspirin  chewable 81 milliGRAM(s) Oral daily  chlorhexidine 0.12% Liquid 15 milliLiter(s) Oral Mucosa every 12 hours  chlorhexidine 2% Cloths 1 Application(s) Topical <User Schedule>  heparin   Injectable 5000 Unit(s) SubCutaneous every 8 hours  lacosamide 200 milliGRAM(s) Oral two times a day  levETIRAcetam 1500 milliGRAM(s) Oral two times a day  norepinephrine Infusion 0.05 MICROgram(s)/kG/Min (8.08 mL/Hr) IV Continuous <Continuous>  pantoprazole  Injectable 40 milliGRAM(s) IV Push daily    MEDICATIONS  (PRN):  midazolam Injectable 2 milliGRAM(s) IV Push every 1 hour PRN agitation/seizure      Tillman:	  [ ] None	[ ] Daily Tillman Order Placed	   Indication:	  [ ] Strict I and O's    [ ] Obstruction     [ ] Incontinence + Stage 3 or 4 Decubitus  Central Line:  [ ] None	   [ ]  Medication / TPN Administration     [ ] No Peripheral IV     ICU Vital Signs Last 24 Hrs  T(C): 37.8 (18 Nov 2022 07:37), Max: 38 (17 Nov 2022 16:00)  T(F): 100 (18 Nov 2022 07:37), Max: 100.4 (17 Nov 2022 16:00)  HR: 101 (18 Nov 2022 08:00) (94 - 104)  BP: 136/75 (18 Nov 2022 08:00) (92/52 - 164/81)  BP(mean): 89 (18 Nov 2022 08:00) (62 - 99)  ABP: --  ABP(mean): --  RR: 24 (18 Nov 2022 08:00) (24 - 27)  SpO2: 97% (18 Nov 2022 08:00) (92% - 100%)        Physical Exam:  General: NAD  HEENT: NC/AT, EOMI, PERRLA, no oral lesions, neck supple w/o LAD  Pulmonary: intubated  Cardiovascular: NSR, no murmurs  Abdominal: soft, NT/ND, +BS, no organomegaly  Extremities: WWP, 5/5 strength x 4, no clubbing/cyanosis/edema  Neuro: sedated  Pulses: palpable distal pulses    Lines/tubes/drains:    Vent settings:  Mode: AC/ CMV (Assist Control/ Continuous Mandatory Ventilation), RR (machine): 24, TV (machine): 450, FiO2: 40, PEEP: 5, ITime: 0.9, MAP: 12, PIP: 21    I&O's Summary    17 Nov 2022 07:01  -  18 Nov 2022 07:00  --------------------------------------------------------  IN: 800.9 mL / OUT: 1320 mL / NET: -519.1 mL        LABS:                        13.7   7.20  )-----------( 232      ( 18 Nov 2022 04:10 )             42.6     11-18    143  |  112<H>  |  32<H>  ----------------------------<  264<H>  4.4   |  19<L>  |  1.76<H>    Ca    9.0      18 Nov 2022 04:10  Phos  3.6     11-18  Mg     2.6     11-18    TPro  6.9  /  Alb  2.3<L>  /  TBili  0.7  /  DBili  x   /  AST  19  /  ALT  49  /  AlkPhos  64  11-18        CAPILLARY BLOOD GLUCOSE      POCT Blood Glucose.: 91 mg/dL (18 Nov 2022 08:56)  POCT Blood Glucose.: 228 mg/dL (17 Nov 2022 23:48)  POCT Blood Glucose.: 189 mg/dL (17 Nov 2022 17:48)  POCT Blood Glucose.: 173 mg/dL (17 Nov 2022 12:53)    LIVER FUNCTIONS - ( 18 Nov 2022 04:10 )  Alb: 2.3 g/dL / Pro: 6.9 gm/dL / ALK PHOS: 64 U/L / ALT: 49 U/L / AST: 19 U/L / GGT: x             Cultures:    Drips:    RADIOLOGY & ADDITIONAL STUDIES:

## 2022-11-19 LAB
ALBUMIN SERPL ELPH-MCNC: 2.2 G/DL — LOW (ref 3.3–5)
ALP SERPL-CCNC: 76 U/L — SIGNIFICANT CHANGE UP (ref 40–120)
ALT FLD-CCNC: 60 U/L — SIGNIFICANT CHANGE UP (ref 12–78)
ANION GAP SERPL CALC-SCNC: 13 MMOL/L — SIGNIFICANT CHANGE UP (ref 5–17)
APPEARANCE UR: CLEAR — SIGNIFICANT CHANGE UP
AST SERPL-CCNC: 42 U/L — HIGH (ref 15–37)
BACTERIA # UR AUTO: ABNORMAL
BASOPHILS # BLD AUTO: 0.03 K/UL — SIGNIFICANT CHANGE UP (ref 0–0.2)
BASOPHILS NFR BLD AUTO: 0.5 % — SIGNIFICANT CHANGE UP (ref 0–2)
BILIRUB SERPL-MCNC: 0.8 MG/DL — SIGNIFICANT CHANGE UP (ref 0.2–1.2)
BILIRUB UR-MCNC: NEGATIVE — SIGNIFICANT CHANGE UP
BUN SERPL-MCNC: 47 MG/DL — HIGH (ref 7–23)
CALCIUM SERPL-MCNC: 8.9 MG/DL — SIGNIFICANT CHANGE UP (ref 8.5–10.1)
CHLORIDE SERPL-SCNC: 121 MMOL/L — HIGH (ref 96–108)
CO2 SERPL-SCNC: 17 MMOL/L — LOW (ref 22–31)
COLOR SPEC: YELLOW — SIGNIFICANT CHANGE UP
CREAT SERPL-MCNC: 1.68 MG/DL — HIGH (ref 0.5–1.3)
CULTURE RESULTS: SIGNIFICANT CHANGE UP
CULTURE RESULTS: SIGNIFICANT CHANGE UP
DIFF PNL FLD: ABNORMAL
EGFR: 42 ML/MIN/1.73M2 — LOW
EOSINOPHIL # BLD AUTO: 0.04 K/UL — SIGNIFICANT CHANGE UP (ref 0–0.5)
EOSINOPHIL NFR BLD AUTO: 0.6 % — SIGNIFICANT CHANGE UP (ref 0–6)
EPI CELLS # UR: SIGNIFICANT CHANGE UP
GLUCOSE BLDC GLUCOMTR-MCNC: 336 MG/DL — HIGH (ref 70–99)
GLUCOSE BLDC GLUCOMTR-MCNC: 339 MG/DL — HIGH (ref 70–99)
GLUCOSE BLDC GLUCOMTR-MCNC: 355 MG/DL — HIGH (ref 70–99)
GLUCOSE BLDC GLUCOMTR-MCNC: 362 MG/DL — HIGH (ref 70–99)
GLUCOSE BLDC GLUCOMTR-MCNC: 367 MG/DL — HIGH (ref 70–99)
GLUCOSE SERPL-MCNC: 370 MG/DL — HIGH (ref 70–99)
GLUCOSE UR QL: 1000 MG/DL
GRAM STN FLD: SIGNIFICANT CHANGE UP
HCT VFR BLD CALC: 42.5 % — SIGNIFICANT CHANGE UP (ref 39–50)
HGB BLD-MCNC: 13.4 G/DL — SIGNIFICANT CHANGE UP (ref 13–17)
IMM GRANULOCYTES NFR BLD AUTO: 0.6 % — SIGNIFICANT CHANGE UP (ref 0–0.9)
KETONES UR-MCNC: ABNORMAL
LEUKOCYTE ESTERASE UR-ACNC: NEGATIVE — SIGNIFICANT CHANGE UP
LYMPHOCYTES # BLD AUTO: 0.75 K/UL — LOW (ref 1–3.3)
LYMPHOCYTES # BLD AUTO: 12.1 % — LOW (ref 13–44)
MAGNESIUM SERPL-MCNC: 2.7 MG/DL — HIGH (ref 1.6–2.6)
MCHC RBC-ENTMCNC: 30.1 PG — SIGNIFICANT CHANGE UP (ref 27–34)
MCHC RBC-ENTMCNC: 31.5 G/DL — LOW (ref 32–36)
MCV RBC AUTO: 95.5 FL — SIGNIFICANT CHANGE UP (ref 80–100)
MONOCYTES # BLD AUTO: 0.84 K/UL — SIGNIFICANT CHANGE UP (ref 0–0.9)
MONOCYTES NFR BLD AUTO: 13.6 % — SIGNIFICANT CHANGE UP (ref 2–14)
NEUTROPHILS # BLD AUTO: 4.48 K/UL — SIGNIFICANT CHANGE UP (ref 1.8–7.4)
NEUTROPHILS NFR BLD AUTO: 72.6 % — SIGNIFICANT CHANGE UP (ref 43–77)
NITRITE UR-MCNC: NEGATIVE — SIGNIFICANT CHANGE UP
NRBC # BLD: 0 /100 WBCS — SIGNIFICANT CHANGE UP (ref 0–0)
PH UR: 6 — SIGNIFICANT CHANGE UP (ref 5–8)
PHOSPHATE SERPL-MCNC: 2.3 MG/DL — LOW (ref 2.5–4.5)
PLATELET # BLD AUTO: 160 K/UL — SIGNIFICANT CHANGE UP (ref 150–400)
POTASSIUM SERPL-MCNC: 4.4 MMOL/L — SIGNIFICANT CHANGE UP (ref 3.5–5.3)
POTASSIUM SERPL-SCNC: 4.4 MMOL/L — SIGNIFICANT CHANGE UP (ref 3.5–5.3)
PROT SERPL-MCNC: 6.5 GM/DL — SIGNIFICANT CHANGE UP (ref 6–8.3)
PROT UR-MCNC: 30 MG/DL
RBC # BLD: 4.45 M/UL — SIGNIFICANT CHANGE UP (ref 4.2–5.8)
RBC # FLD: 14.4 % — SIGNIFICANT CHANGE UP (ref 10.3–14.5)
RBC CASTS # UR COMP ASSIST: SIGNIFICANT CHANGE UP /HPF (ref 0–4)
SODIUM SERPL-SCNC: 151 MMOL/L — HIGH (ref 135–145)
SP GR SPEC: 1.01 — SIGNIFICANT CHANGE UP (ref 1.01–1.02)
SPECIMEN SOURCE: SIGNIFICANT CHANGE UP
UROBILINOGEN FLD QL: NEGATIVE MG/DL — SIGNIFICANT CHANGE UP
WBC # BLD: 6.18 K/UL — SIGNIFICANT CHANGE UP (ref 3.8–10.5)
WBC # FLD AUTO: 6.18 K/UL — SIGNIFICANT CHANGE UP (ref 3.8–10.5)
WBC UR QL: SIGNIFICANT CHANGE UP

## 2022-11-19 PROCEDURE — 99291 CRITICAL CARE FIRST HOUR: CPT

## 2022-11-19 PROCEDURE — 95720 EEG PHY/QHP EA INCR W/VEEG: CPT

## 2022-11-19 RX ORDER — LACOSAMIDE 50 MG/1
100 TABLET ORAL ONCE
Refills: 0 | Status: DISCONTINUED | OUTPATIENT
Start: 2022-11-19 | End: 2022-11-19

## 2022-11-19 RX ORDER — INSULIN LISPRO 100/ML
VIAL (ML) SUBCUTANEOUS EVERY 6 HOURS
Refills: 0 | Status: DISCONTINUED | OUTPATIENT
Start: 2022-11-19 | End: 2022-11-21

## 2022-11-19 RX ORDER — LACOSAMIDE 50 MG/1
200 TABLET ORAL EVERY 12 HOURS
Refills: 0 | Status: DISCONTINUED | OUTPATIENT
Start: 2022-11-20 | End: 2022-11-20

## 2022-11-19 RX ORDER — POTASSIUM PHOSPHATE, MONOBASIC POTASSIUM PHOSPHATE, DIBASIC 236; 224 MG/ML; MG/ML
15 INJECTION, SOLUTION INTRAVENOUS ONCE
Refills: 0 | Status: COMPLETED | OUTPATIENT
Start: 2022-11-19 | End: 2022-11-19

## 2022-11-19 RX ORDER — INSULIN GLARGINE 100 [IU]/ML
20 INJECTION, SOLUTION SUBCUTANEOUS AT BEDTIME
Refills: 0 | Status: DISCONTINUED | OUTPATIENT
Start: 2022-11-19 | End: 2022-11-21

## 2022-11-19 RX ORDER — INSULIN GLARGINE 100 [IU]/ML
10 INJECTION, SOLUTION SUBCUTANEOUS EVERY MORNING
Refills: 0 | Status: DISCONTINUED | OUTPATIENT
Start: 2022-11-19 | End: 2022-11-19

## 2022-11-19 RX ORDER — INSULIN GLARGINE 100 [IU]/ML
20 INJECTION, SOLUTION SUBCUTANEOUS EVERY MORNING
Refills: 0 | Status: DISCONTINUED | OUTPATIENT
Start: 2022-11-20 | End: 2022-11-21

## 2022-11-19 RX ORDER — INSULIN GLARGINE 100 [IU]/ML
10 INJECTION, SOLUTION SUBCUTANEOUS ONCE
Refills: 0 | Status: DISCONTINUED | OUTPATIENT
Start: 2022-11-19 | End: 2022-11-19

## 2022-11-19 RX ORDER — LEVETIRACETAM 250 MG/1
1500 TABLET, FILM COATED ORAL EVERY 12 HOURS
Refills: 0 | Status: DISCONTINUED | OUTPATIENT
Start: 2022-11-20 | End: 2022-11-20

## 2022-11-19 RX ORDER — INSULIN GLARGINE 100 [IU]/ML
10 INJECTION, SOLUTION SUBCUTANEOUS AT BEDTIME
Refills: 0 | Status: DISCONTINUED | OUTPATIENT
Start: 2022-11-19 | End: 2022-11-19

## 2022-11-19 RX ORDER — INSULIN GLARGINE 100 [IU]/ML
15 INJECTION, SOLUTION SUBCUTANEOUS AT BEDTIME
Refills: 0 | Status: DISCONTINUED | OUTPATIENT
Start: 2022-11-19 | End: 2022-11-19

## 2022-11-19 RX ADMIN — Medication 8: at 23:07

## 2022-11-19 RX ADMIN — LEVETIRACETAM 1500 MILLIGRAM(S): 250 TABLET, FILM COATED ORAL at 18:30

## 2022-11-19 RX ADMIN — Medication 10: at 11:54

## 2022-11-19 RX ADMIN — Medication 81 MILLIGRAM(S): at 11:53

## 2022-11-19 RX ADMIN — LACOSAMIDE 150 MILLIGRAM(S): 50 TABLET ORAL at 17:41

## 2022-11-19 RX ADMIN — CHLORHEXIDINE GLUCONATE 15 MILLILITER(S): 213 SOLUTION TOPICAL at 17:41

## 2022-11-19 RX ADMIN — LEVETIRACETAM 1500 MILLIGRAM(S): 250 TABLET, FILM COATED ORAL at 05:55

## 2022-11-19 RX ADMIN — POTASSIUM PHOSPHATE, MONOBASIC POTASSIUM PHOSPHATE, DIBASIC 62.5 MILLIMOLE(S): 236; 224 INJECTION, SOLUTION INTRAVENOUS at 05:58

## 2022-11-19 RX ADMIN — CHLORHEXIDINE GLUCONATE 1 APPLICATION(S): 213 SOLUTION TOPICAL at 08:28

## 2022-11-19 RX ADMIN — HEPARIN SODIUM 5000 UNIT(S): 5000 INJECTION INTRAVENOUS; SUBCUTANEOUS at 05:55

## 2022-11-19 RX ADMIN — INSULIN GLARGINE 10 UNIT(S): 100 INJECTION, SOLUTION SUBCUTANEOUS at 10:31

## 2022-11-19 RX ADMIN — LACOSAMIDE 120 MILLIGRAM(S): 50 TABLET ORAL at 19:33

## 2022-11-19 RX ADMIN — HEPARIN SODIUM 5000 UNIT(S): 5000 INJECTION INTRAVENOUS; SUBCUTANEOUS at 21:59

## 2022-11-19 RX ADMIN — LACOSAMIDE 150 MILLIGRAM(S): 50 TABLET ORAL at 05:55

## 2022-11-19 RX ADMIN — INSULIN GLARGINE 20 UNIT(S): 100 INJECTION, SOLUTION SUBCUTANEOUS at 23:06

## 2022-11-19 RX ADMIN — Medication 650 MILLIGRAM(S): at 01:31

## 2022-11-19 RX ADMIN — HEPARIN SODIUM 5000 UNIT(S): 5000 INJECTION INTRAVENOUS; SUBCUTANEOUS at 15:09

## 2022-11-19 RX ADMIN — Medication 10: at 17:42

## 2022-11-19 RX ADMIN — Medication 10: at 05:56

## 2022-11-19 RX ADMIN — Medication 650 MILLIGRAM(S): at 00:01

## 2022-11-19 NOTE — PROGRESS NOTE ADULT - SUBJECTIVE AND OBJECTIVE BOX
# CC: Patient is a 72y old  Male who presents with a chief complaint of s/p cardiac arrest, hypoglycemia (18 Nov 2022 16:54)      ## HPI:  Mr. Hamilton is a 72 year old male with a PMH of lacunar infarct, CHF (EF ~40% per son), CAD s/p PCI with stent and CABGx3 (~2020), s/p PPM (medtronic), HTN, COPD, DMII on insulin and PO meds, BPH, and CKD (stage II?) presenting to ICU s/p cardiac arrest in ED. Per patient son he has recently been doing well and in his normal state of health, however, yesterday Mr. Hamilton started c/o low blood sugars on glucometer. Pt reportedly continued to take his home medications, including both oral antidiabetic agents and insulin (both long and short acting). Today his son found him minimally responsive hanging off his bed, checked his glucose and it read "low," so he called 911. In ED pt was noted to be hypothermic with glucose noted to be 70 just prior to PEA arrest. Pt was coded for ~5 minutes with 2 epi given with ROSC. Pt intubated at that time. is moving all extremities equally and with reactive, equal pupils. He remained hypothermic and is not requiring vasopressor support. Pending CT Scans. ROS otherwise negative per son besides hypoglycemia, with no known sick contacts. Pt accepted to ICU for further management / care.  (14 Nov 2022 01:33)      **O/N:**    ## ROS:    ## Labs:  ** CBC: **                        13.4   6.18  )-----------( 160      ( 19 Nov 2022 04:24 )             42.5     ** Chem:  **  11-19    151<H>  |  121<H>  |  47<H>  ----------------------------<  370<H>  4.4   |  17<L>  |  1.68<H>    Ca    8.9      19 Nov 2022 04:24  Phos  2.3     11-19  Mg     2.7     11-19    TPro  6.5  /  Alb  2.2<L>  /  TBili  0.8  /  DBili  x   /  AST  42<H>  /  ALT  60  /  AlkPhos  76  11-19    ** Coags: **      CAPILLARY BLOOD GLUCOSE      POCT Blood Glucose.: 367 mg/dL (19 Nov 2022 11:43)  POCT Blood Glucose.: 362 mg/dL (19 Nov 2022 05:45)  POCT Blood Glucose.: 337 mg/dL (18 Nov 2022 23:01)  POCT Blood Glucose.: 317 mg/dL (18 Nov 2022 19:59)  POCT Blood Glucose.: 334 mg/dL (18 Nov 2022 16:36)        Culture - Sputum (collected 18 Nov 2022 03:12)  Source: ET Tube ET Tube  Gram Stain (19 Nov 2022 13:23):    Few polymorphonuclear leukocytes per low power field    Few Squamous epithelial cells per low power field    Moderate Yeast per oil power field    Few Gram Positive Cocci in Clusters per oil power field    Culture - Sputum (collected 14 Nov 2022 12:20)  Source: ET Tube ET Tube  Gram Stain (16 Nov 2022 19:10):    Moderate polymorphonuclear leukocytes per low power field    Rare Squamous epithelial cells per low power field    No organisms seen per oil power field  Final Report (18 Nov 2022 10:17):    Few Aspergillus fumigatus complex    Normal Respiratory Larissa present    Culture - Urine (collected 13 Nov 2022 23:19)  Source: Clean Catch Clean Catch (Midstream)  Final Report (15 Nov 2022 07:21):    No growth    Culture - Blood (collected 13 Nov 2022 23:00)  Source: .Blood Blood-Peripheral  Final Report (19 Nov 2022 09:00):    No Growth Final    Culture - Blood (collected 13 Nov 2022 22:45)  Source: .Blood Blood-Peripheral  Final Report (19 Nov 2022 09:00):    No Growth Final        ## Imaging:    ## Medications:        lacosamide 150 milliGRAM(s) Oral two times a day  levETIRAcetam 1500 milliGRAM(s) Oral two times a day  midazolam Injectable 2 milliGRAM(s) IV Push every 1 hour PRN      aspirin  chewable 81 milliGRAM(s) Oral daily  heparin   Injectable 5000 Unit(s) SubCutaneous every 8 hours        insulin glargine Injectable (LANTUS) 15 Unit(s) SubCutaneous at bedtime  insulin lispro (ADMELOG) corrective regimen sliding scale   SubCutaneous every 6 hours          ## O/E:  ICU Vital Signs Last 24 Hrs  T(C): 38.2 (19 Nov 2022 11:30), Max: 38.7 (19 Nov 2022 04:00)  T(F): 100.8 (19 Nov 2022 11:30), Max: 101.7 (19 Nov 2022 04:00)  HR: 98 (19 Nov 2022 12:30) (89 - 108)  BP: 128/76 (19 Nov 2022 12:30) (115/62 - 158/82)  BP(mean): 88 (19 Nov 2022 12:30) (75 - 116)  ABP: --  ABP(mean): --  RR: 21 (19 Nov 2022 12:30) (16 - 23)  SpO2: 90% (19 Nov 2022 12:30) (90% - 99%)    O2 Parameters below as of 19 Nov 2022 07:10  Patient On (Oxygen Delivery Method): ventilator,ETT: FIO2 40%,PEEP5, RR18,     O2 Concentration (%): 40      I&O's Summary    18 Nov 2022 07:01  -  19 Nov 2022 07:00  --------------------------------------------------------  IN: 1355.2 mL / OUT: 2350 mL / NET: -994.8 mL    19 Nov 2022 07:01  -  19 Nov 2022 14:13  --------------------------------------------------------  IN: 120 mL / OUT: 350 mL / NET: -230 mL      Mode: AC/ CMV (Assist Control/ Continuous Mandatory Ventilation), RR (machine): 14, TV (machine): 450, FiO2: 40, PEEP: 5, ITime: 0.9, MAP: 11, PIP: 31  Gen: lying comfortably in bed in no apparent distress  HEENT: PERRL, EOMI  Resp: CTA B/L no c/r/w  CVS: S1S2 no m/r/g  Abd: soft NT/ND +BS  Ext: no c/c/e  Neuro: A&Ox3    ## Code status:  Goals of care discussion: [x] yes [ ] no  [x] full code  [ ] DNR  [ ] DNI  [ ] JOSÉ MANUEL # CC: Patient is a 72y old  Male who presents with a chief complaint of s/p cardiac arrest, hypoglycemia (18 Nov 2022 16:54)      ## HPI:  Mr. Hamilton is a 72 year old male with a PMH of lacunar infarct, CHF (EF ~40% per son), CAD s/p PCI with stent and CABGx3 (~2020), s/p PPM (medtronic), HTN, COPD, DMII on insulin and PO meds, BPH, and CKD (stage II?) presenting to ICU s/p cardiac arrest in ED. Per patient son he has recently been doing well and in his normal state of health, however, yesterday Mr. Hamilton started c/o low blood sugars on glucometer. Pt reportedly continued to take his home medications, including both oral antidiabetic agents and insulin (both long and short acting). Today his son found him minimally responsive hanging off his bed, checked his glucose and it read "low," so he called 911. In ED pt was noted to be hypothermic with glucose noted to be 70 just prior to PEA arrest. Pt was coded for ~5 minutes with 2 epi given with ROSC. Pt intubated at that time. is moving all extremities equally and with reactive, equal pupils. He remained hypothermic and is not requiring vasopressor support. Pending CT Scans. ROS otherwise negative per son besides hypoglycemia, with no known sick contacts. Pt accepted to ICU for further management / care.    **O/N:**  Remains intubated, on 24h vEEG    ## ROS:  Unobtainable    ## Labs:  ** CBC: **             13.4   6.18  )-----------( 160      ( 19 Nov 2022 04:24 )             42.5     ** Chem:  **  11-19    151<H>  |  121<H>  |  47<H>  ----------------------------<  370<H>  4.4   |  17<L>  |  1.68<H>    Ca    8.9      19 Nov 2022 04:24  Phos  2.3     11-19  Mg     2.7     11-19    TPro  6.5  /  Alb  2.2<L>  /  TBili  0.8  /  DBili  x   /  AST  42<H>  /  ALT  60  /  AlkPhos  76  11-19    POCT Blood Glucose.: 367 mg/dL (19 Nov 2022 11:43)  POCT Blood Glucose.: 362 mg/dL (19 Nov 2022 05:45)  POCT Blood Glucose.: 337 mg/dL (18 Nov 2022 23:01)  POCT Blood Glucose.: 317 mg/dL (18 Nov 2022 19:59)  POCT Blood Glucose.: 334 mg/dL (18 Nov 2022 16:36)    Culture - Sputum (collected 18 Nov 2022 03:12)  Source: ET Tube ET Tube  Gram Stain (19 Nov 2022 13:23):    Few polymorphonuclear leukocytes per low power field    Few Squamous epithelial cells per low power field    Moderate Yeast per oil power field    Few Gram Positive Cocci in Clusters per oil power field    Culture - Sputum (collected 14 Nov 2022 12:20)  Source: ET Tube ET Tube  Gram Stain (16 Nov 2022 19:10):    Moderate polymorphonuclear leukocytes per low power field    Rare Squamous epithelial cells per low power field    No organisms seen per oil power field  Final Report (18 Nov 2022 10:17):    Few Aspergillus fumigatus complex    Normal Respiratory Larissa present    Culture - Urine (collected 13 Nov 2022 23:19)  Source: Clean Catch Clean Catch (Midstream)  Final Report (15 Nov 2022 07:21):    No growth    Culture - Blood (collected 13 Nov 2022 23:00)  Source: .Blood Blood-Peripheral  Final Report (19 Nov 2022 09:00):    No Growth Final    Culture - Blood (collected 13 Nov 2022 22:45)  Source: .Blood Blood-Peripheral  Final Report (19 Nov 2022 09:00):    No Growth Final    ## Medications:  lacosamide 150 milliGRAM(s) Oral two times a day  levETIRAcetam 1500 milliGRAM(s) Oral two times a day  midazolam Injectable 2 milliGRAM(s) IV Push every 1 hour PRN    aspirin  chewable 81 milliGRAM(s) Oral daily  heparin   Injectable 5000 Unit(s) SubCutaneous every 8 hours    insulin glargine Injectable (LANTUS) 15 Unit(s) SubCutaneous at bedtime  insulin lispro (ADMELOG) corrective regimen sliding scale   SubCutaneous every 6 hours    ## O/E:  ICU Vital Signs Last 24 Hrs  T(C): 38.2 (19 Nov 2022 11:30), Max: 38.7 (19 Nov 2022 04:00)  T(F): 100.8 (19 Nov 2022 11:30), Max: 101.7 (19 Nov 2022 04:00)  HR: 98 (19 Nov 2022 12:30) (89 - 108)  BP: 128/76 (19 Nov 2022 12:30) (115/62 - 158/82)  BP(mean): 88 (19 Nov 2022 12:30) (75 - 116)  ABP: --  ABP(mean): --  RR: 21 (19 Nov 2022 12:30) (16 - 23)  SpO2: 90% (19 Nov 2022 12:30) (90% - 99%)    O2 Parameters below as of 19 Nov 2022 07:10  Patient On (Oxygen Delivery Method): ventilator,ETT: FIO2 40%,PEEP5, RR18,     O2 Concentration (%): 40      I&O's Summary    18 Nov 2022 07:01  -  19 Nov 2022 07:00  --------------------------------------------------------  IN: 1355.2 mL / OUT: 2350 mL / NET: -994.8 mL    19 Nov 2022 07:01  -  19 Nov 2022 14:13  --------------------------------------------------------  IN: 120 mL / OUT: 350 mL / NET: -230 mL      Mode: AC/ CMV (Assist Control/ Continuous Mandatory Ventilation), RR (machine): 14, TV (machine): 450, FiO2: 40, PEEP: 5, ITime: 0.9, MAP: 11, PIP: 31  Gen: orotracheally intubated on full vent  HEENT: PERRL  Resp: mechanical breath sounds B/L  CVS: S1S2 no m/r/g  Abd: soft NT/ND +BS  Ext: no c/c/e  Neuro: minimally responsive    ## Code status:  Goals of care discussion: [x] yes [ ] no  [x] full code  [ ] DNR  [ ] DNI  [ ] MOLST

## 2022-11-19 NOTE — EEG REPORT - NS EEG TEXT BOX
Start date/time: 11/18/22 13:17  End date/time: 11/19/22 08:00  Duration: 18:41 hrs  Study Interpretation:    FINDINGS:      Background:  Symmetry: symmetric   Continuous: continuous  PDR: absent   Reactivity: absent   Voltage: normal, mostly 20-150uV  Anterior Posterior Gradient: absent   Breach: absent    Background Slowing:  Generalized slowing: diffuse theta and delta activity present     Focal slowing:   Continuous left hemispheric delta and theta    State Changes:   Absent    Rhythmic and Periodic Patterns (RPPs):  Abundant lateralized periodic discharges predominantly over the left posterior region, mostly 1-1.5hz, at times occurring as BIRDs for 3-4secs at frequencies up to 4-5hz with no clinical correlate.     Electrographic and Electroclinical seizures:  no seizures recorded     Other Clinical Events:  None    Activation Procedures:   -Hyperventilation was not performed.    -Photic stimulation was performed and did not elicit any abnormalities.      Artifacts:  Intermittent myogenic and movement artifacts were noted.    ECG:  ECG lead w significant artifact     EEG Classification / Summary:    Abnormal EEG awake / drowsy / sleep  1. LPDs in left posterior region, mostly 1-1.5hz, at times occurring as BIRDs for 3-4secs at frequencies up to 4-5hz.   2. Focal slowing in left hemisphere  3. Background slowing, moderately severe, generalized     Clinical Impression:  Findings indicate:    1. Evidence for left hemispheric cortical/subcortical lesion with increased risk for seizures. Presence of BIRDs may represent short electrographic seizures.   2. Moderately severe diffuse/multifocal cerebral dysfunction, not specific as to etiology.    This is a prelim report only, pending review with attending prior to finalization.    Montrell Ashby MD  Epilepsy Fellow , [facility]  Department of Neurology, [facility] EEG Reading Room Ph#: [phone]  Epilepsy Answering Service after 5PM and before 8:30AM: Ph#: [phone]   Start date/time: 11/18/22 13:17  End date/time: 11/19/22 08:00  Duration: 18:41 hrs  Study Interpretation:    FINDINGS:      Background:  Symmetry: symmetric   Continuous: continuous  PDR: absent   Reactivity: absent   Voltage: normal, mostly 20-150uV  Anterior Posterior Gradient: absent   Breach: absent    Background Slowing:  Generalized slowing: diffuse theta and delta activity present     Focal slowing:    Continuous left hemispheric delta and theta    State Changes:   Absent    Rhythmic and Periodic Patterns (RPPs):  Abundant lateralized periodic discharges predominantly over the left posterior region, mostly 1-1.5hz, at times occurring as BIRDs for 3-4secs at frequencies up to 4-5hz with no clinical correlate or evolution.     Electrographic and Electroclinical seizures:  Some twitching with LPDs electrographically may suggest intermittent myoclonus, difficult to appreciate on video    Other Clinical Events:  None    Activation Procedures:   -Hyperventilation was not performed.    -Photic stimulation was performed and did not elicit any abnormalities.      Artifacts:  Intermittent myogenic and movement artifacts were noted.    ECG:  ECG lead w significant artifact     EEG Classification / Summary:    Abnormal EEG awake / drowsy / sleep  1. LPDs in left posterior region, mostly 1-1.5hz, at times occurring as BIRDs for 3-4secs at frequencies up to 4-5hz.   Some twitching with LPDs electrographically may suggest intermittent myoclonus, difficult to appreciate on video  2. Focal slowing in left hemisphere  3. Background slowing, moderately severe, generalized     Clinical Impression:  Findings indicate:    1. Evidence for left parietal lesion with high risk for seizures. Subtle myoclonus suspected intermittently with some of the LPDs  2. Moderate diffuse/multifocal cerebral dysfunction, not specific as to etiology.      Montrell Ashby MD  Epilepsy Fellow , [facility]  Department of Neurology, [facility] EEG Reading Room Ph#: [phone]  Epilepsy Answering Service after 5PM and before 8:30AM: Ph#: [phone]   Start date/time: 11/18/22 13:17  End date/time: 11/19/22 08:00  Duration: 18:41 hrs  Study Interpretation:    FINDINGS:      Background:  Symmetry: symmetric   Continuous: continuous  PDR: absent   Reactivity: absent   Voltage: normal, mostly 20-150uV  Anterior Posterior Gradient: absent   Breach: absent    Background Slowing:  Generalized slowing: diffuse theta and delta activity present     Focal slowing:    Continuous left hemispheric delta and theta    State Changes:   Absent    Rhythmic and Periodic Patterns (RPPs):  Abundant lateralized periodic discharges predominantly over the left posterior region, mostly 1-1.5hz, at times occurring as BIRDs for 3-4secs at frequencies up to 4-5hz with no clinical correlate or evolution.     Electrographic and Electroclinical seizures:  Some twitching with LPDs electrographically may suggest intermittent myoclonus, difficult to appreciate on video    Other Clinical Events:  None    Activation Procedures:   -Hyperventilation was not performed.    -Photic stimulation was performed and did not elicit any abnormalities.      Artifacts:  Intermittent myogenic and movement artifacts were noted.    ECG:  ECG lead w significant artifact     EEG Classification / Summary:    Abnormal EEG awake / drowsy / sleep  1. LPDs in left posterior region, mostly 1-1.5hz, at times occurring as BIRDs for 3-4secs at frequencies up to 4-5hz.   Some twitching with LPDs electrographically may suggest intermittent myoclonus, difficult to appreciate on video  2. Focal slowing in left hemisphere  3. Background slowing, moderately severe, generalized     Clinical Impression:  Findings indicate:    1. Evidence for left parietal lesion with high risk for seizures. Subtle myoclonic seizure activity suspected intermittently with some of the LPDs  2. Moderate diffuse/multifocal cerebral dysfunction, not specific as to etiology.      Montrell Ashby MD  Epilepsy Fellow , [facility]  Department of Neurology, [facility] EEG Reading Room Ph#: [phone]  Epilepsy Answering Service after 5PM and before 8:30AM: Ph#: [phone]   Start date/time: 11/18/22 13:17  End date/time: 11/19/22 08:00  Duration: 18:41 hrs  Study Interpretation:    FINDINGS:      Background:  Symmetry: symmetric   Continuous: continuous  PDR: absent   Reactivity: absent   Voltage: normal, mostly 20-150uV  Anterior Posterior Gradient: absent   Breach: absent    Background Slowing:  Generalized slowing: diffuse theta and delta activity present     Focal slowing:    Continuous left hemispheric delta and theta    State Changes:   Absent    Rhythmic and Periodic Patterns (RPPs):  Abundant lateralized periodic discharges predominantly over the left parietal region max P3 field at times to P4 Cz, mostly 1-1.5hz, at times occurring as BIRDs for 3-4secs at frequencies up to 4-5hz with no clinical correlate or evolution.      Electrographic and Electroclinical seizures:  Discharges at times more broadly distributed, some twitching with LPDs electrographically may suggest intermittent myoclonus, difficult to appreciate on video    Other Clinical Events:  None    Activation Procedures:   -Hyperventilation was not performed.    -Photic stimulation was performed and did not elicit any abnormalities.      Artifacts:  Intermittent myogenic and movement artifacts were noted.    ECG:  ECG lead w significant artifact     EEG Classification / Summary:    Abnormal EEG awake / drowsy / sleep  1. LPDs in left parietal region, mostly 1-1.5hz, at times occurring as BIRDs for 3-4secs at frequencies up to 4-5hz.   Some twitching with LPDs electrographically may suggest intermittent myoclonus, difficult to appreciate on video  2. Focal slowing in left hemisphere  3. Background slowing, moderately severe, generalized     Clinical Impression:  Findings indicate:    1. Evidence for left parietal lesion with high risk for seizures. Subtle myoclonic seizure activity suspected intermittently with some of the LPDs  2. Moderate diffuse/multifocal cerebral dysfunction, not specific as to etiology.      Montrell Ashby MD  Epilepsy Fellow , [facility]  Department of Neurology, [facility] [employer]    [facility] EEG Reading Room Ph#: [phone]  Epilepsy Answering Service after 5PM and before 8:30AM: Ph#: [phone]

## 2022-11-19 NOTE — PROGRESS NOTE ADULT - ASSESSMENT
A/P:   73 yo man with cardiac arrest preceded by severe hypoglycemia.  Exam suggestive of anoxic brain injury.    Brainstem reflexes intact, but no signs of higher cortical function. EEG sugests left parietal lesion possible subclinical seizure    Plan:  1. Monitor off sedation, and repeat exam to evaluate for signs of wakefulness  2. Continue Keppra 1500mg IV q12  3. Increase Vimpat to 150mg IV q12  4. Supportive care    Covering for Dr. Loo for the weekend

## 2022-11-19 NOTE — PROGRESS NOTE ADULT - ASSESSMENT
72M with PEA arrest ?hypoglycemia with seizure-like activity  - remains intubated with minimal mental status: 18/450/40%/+5 decreased RR 14  - EEG currently with areas of excitability but no overt seizures  - c/w AED regimen per neurology  - UO/P good, 1L net negative  - c/w insulin (Lantus + sliding scale), avoid hypoglycemia  - DVT ppx with Hep SC  - Overall prognosis is relatively poor given persistently diminished mental status.    I have personally provided 45 minutes of critical care time.

## 2022-11-19 NOTE — PROGRESS NOTE ADULT - SUBJECTIVE AND OBJECTIVE BOX
No significant change in mental status,      MEDICATIONS:    aspirin  chewable 81 milliGRAM(s) Oral daily  chlorhexidine 0.12% Liquid 15 milliLiter(s) Oral Mucosa every 12 hours  chlorhexidine 2% Cloths 1 Application(s) Topical <User Schedule>  heparin   Injectable 5000 Unit(s) SubCutaneous every 8 hours  insulin glargine Injectable (LANTUS) 20 Unit(s) SubCutaneous at bedtime  insulin lispro (ADMELOG) corrective regimen sliding scale   SubCutaneous every 6 hours  midazolam Injectable 2 milliGRAM(s) IV Push every 1 hour PRN      LABS:                          13.4   6.18  )-----------( 160      ( 2022 04:24 )             42.5         151<H>  |  121<H>  |  47<H>  ----------------------------<  370<H>  4.4   |  17<L>  |  1.68<H>    Ca    8.9      2022 04:24  Phos  2.3       Mg     2.7         TPro  6.5  /  Alb  2.2<L>  /  TBili  0.8  /  DBili  x   /  AST  42<H>  /  ALT  60  /  AlkPhos  76      CAPILLARY BLOOD GLUCOSE      POCT Blood Glucose.: 339 mg/dL (2022 19:52)  POCT Blood Glucose.: 355 mg/dL (2022 17:17)  POCT Blood Glucose.: 367 mg/dL (2022 11:43)  POCT Blood Glucose.: 362 mg/dL (2022 05:45)  POCT Blood Glucose.: 337 mg/dL (2022 23:01)      Urinalysis Basic - ( 2022 03:30 )    Color: Yellow / Appearance: Clear / S.015 / pH: x  Gluc: x / Ketone: Moderate  / Bili: Negative / Urobili: Negative mg/dL   Blood: x / Protein: 30 mg/dL / Nitrite: Negative   Leuk Esterase: Negative / RBC: 0-2 /HPF / WBC 0-2   Sq Epi: x / Non Sq Epi: Occasional / Bacteria: Few      I&O's Summary    2022 07:01  -  2022 07:00  --------------------------------------------------------  IN: 1355.2 mL / OUT: 2350 mL / NET: -994.8 mL    2022 07:01  -  2022 21:09  --------------------------------------------------------  IN: 1020 mL / OUT: 1285 mL / NET: -265 mL      Vital Signs Last 24 Hrs  T(C): 38 (2022 20:00), Max: 38.7 (2022 04:00)  T(F): 100.4 (:00), Max: 101.7 (2022 04:00)  HR: 88 (:00) (88 - 108)  BP: 136/72 (2022 20:00) (115/62 - 158/82)  BP(mean): 88 (:00) (75 - 100)  RR: 17 (:00) (16 - 26)  SpO2: 97% (:00) (90% - 98%)    Parameters below as of 2022 07:10  Patient On (Oxygen Delivery Method): ventilator,ETT: FIO2 40%,PEEP5, RR18,     O2 Concentration (%): 40      ROS: pertinent positives in HPI, all other ROS were reviewed and are negative       Neurological exam:  Neurological exam:  HF: No response to auditory or noxious stimulation, coughs against the ventilator, breathes over the ventilator  CN: Pupils miotic, sluggishly reactive to light, can doll the eye to the left but not the right, +corneal reflex b/l  Motor: No spontaneous movement or withdrawal from painful stimuli  Sens: movement of left toes with stimulation of sole of foot  Reflexes: depressed throughout          Clinical Impression:  Findings indicate:    1. Evidence for left parietal lesion with high risk for seizures. Subtle myoclonic seizure activity suspected intermittently with some of the LPDs  2. Moderate diffuse/multifocal cerebral dysfunction, not specific as to etiology.      Montrell Ashby MD  Epilepsy Fellow , [facility]  Department of Neurology, [facility]    [facility] EEG Reading Room Ph#: [phone]  Epilepsy Answering Service after 5PM and before 8:30AM: Ph#: [phone]

## 2022-11-20 LAB
ALBUMIN SERPL ELPH-MCNC: 2.4 G/DL — LOW (ref 3.3–5)
ALP SERPL-CCNC: 80 U/L — SIGNIFICANT CHANGE UP (ref 40–120)
ALT FLD-CCNC: 72 U/L — SIGNIFICANT CHANGE UP (ref 12–78)
ANION GAP SERPL CALC-SCNC: 5 MMOL/L — SIGNIFICANT CHANGE UP (ref 5–17)
AST SERPL-CCNC: 47 U/L — HIGH (ref 15–37)
BASOPHILS # BLD AUTO: 0.07 K/UL — SIGNIFICANT CHANGE UP (ref 0–0.2)
BASOPHILS NFR BLD AUTO: 0.9 % — SIGNIFICANT CHANGE UP (ref 0–2)
BILIRUB SERPL-MCNC: 0.6 MG/DL — SIGNIFICANT CHANGE UP (ref 0.2–1.2)
BUN SERPL-MCNC: 61 MG/DL — HIGH (ref 7–23)
CALCIUM SERPL-MCNC: 9.7 MG/DL — SIGNIFICANT CHANGE UP (ref 8.5–10.1)
CHLORIDE SERPL-SCNC: 123 MMOL/L — HIGH (ref 96–108)
CO2 SERPL-SCNC: 26 MMOL/L — SIGNIFICANT CHANGE UP (ref 22–31)
CREAT SERPL-MCNC: 1.66 MG/DL — HIGH (ref 0.5–1.3)
EGFR: 43 ML/MIN/1.73M2 — LOW
EOSINOPHIL # BLD AUTO: 0.17 K/UL — SIGNIFICANT CHANGE UP (ref 0–0.5)
EOSINOPHIL NFR BLD AUTO: 2.1 % — SIGNIFICANT CHANGE UP (ref 0–6)
GLUCOSE BLDC GLUCOMTR-MCNC: 296 MG/DL — HIGH (ref 70–99)
GLUCOSE BLDC GLUCOMTR-MCNC: 297 MG/DL — HIGH (ref 70–99)
GLUCOSE BLDC GLUCOMTR-MCNC: 305 MG/DL — HIGH (ref 70–99)
GLUCOSE BLDC GLUCOMTR-MCNC: 318 MG/DL — HIGH (ref 70–99)
GLUCOSE SERPL-MCNC: 340 MG/DL — HIGH (ref 70–99)
HCT VFR BLD CALC: 45.3 % — SIGNIFICANT CHANGE UP (ref 39–50)
HGB BLD-MCNC: 14.6 G/DL — SIGNIFICANT CHANGE UP (ref 13–17)
IMM GRANULOCYTES NFR BLD AUTO: 0.4 % — SIGNIFICANT CHANGE UP (ref 0–0.9)
LACTATE SERPL-SCNC: 1.1 MMOL/L — SIGNIFICANT CHANGE UP (ref 0.7–2)
LDH SERPL L TO P-CCNC: 423 U/L — HIGH (ref 50–242)
LYMPHOCYTES # BLD AUTO: 1.03 K/UL — SIGNIFICANT CHANGE UP (ref 1–3.3)
LYMPHOCYTES # BLD AUTO: 12.5 % — LOW (ref 13–44)
MAGNESIUM SERPL-MCNC: 3.2 MG/DL — HIGH (ref 1.6–2.6)
MCHC RBC-ENTMCNC: 31 PG — SIGNIFICANT CHANGE UP (ref 27–34)
MCHC RBC-ENTMCNC: 32.2 G/DL — SIGNIFICANT CHANGE UP (ref 32–36)
MCV RBC AUTO: 96.2 FL — SIGNIFICANT CHANGE UP (ref 80–100)
MONOCYTES # BLD AUTO: 1.03 K/UL — HIGH (ref 0–0.9)
MONOCYTES NFR BLD AUTO: 12.5 % — SIGNIFICANT CHANGE UP (ref 2–14)
NEUTROPHILS # BLD AUTO: 5.88 K/UL — SIGNIFICANT CHANGE UP (ref 1.8–7.4)
NEUTROPHILS NFR BLD AUTO: 71.6 % — SIGNIFICANT CHANGE UP (ref 43–77)
NRBC # BLD: 0 /100 WBCS — SIGNIFICANT CHANGE UP (ref 0–0)
PHOSPHATE SERPL-MCNC: 2.5 MG/DL — SIGNIFICANT CHANGE UP (ref 2.5–4.5)
PLATELET # BLD AUTO: 234 K/UL — SIGNIFICANT CHANGE UP (ref 150–400)
POTASSIUM SERPL-MCNC: 4.7 MMOL/L — SIGNIFICANT CHANGE UP (ref 3.5–5.3)
POTASSIUM SERPL-SCNC: 4.7 MMOL/L — SIGNIFICANT CHANGE UP (ref 3.5–5.3)
PROCALCITONIN SERPL-MCNC: 0.28 NG/ML — HIGH (ref 0.02–0.1)
PROCALCITONIN SERPL-MCNC: 0.33 NG/ML — HIGH (ref 0.02–0.1)
PROT SERPL-MCNC: 7.1 GM/DL — SIGNIFICANT CHANGE UP (ref 6–8.3)
RAPID RVP RESULT: SIGNIFICANT CHANGE UP
RBC # BLD: 4.71 M/UL — SIGNIFICANT CHANGE UP (ref 4.2–5.8)
RBC # FLD: 14.5 % — SIGNIFICANT CHANGE UP (ref 10.3–14.5)
SARS-COV-2 RNA SPEC QL NAA+PROBE: SIGNIFICANT CHANGE UP
SODIUM SERPL-SCNC: 154 MMOL/L — HIGH (ref 135–145)
WBC # BLD: 8.21 K/UL — SIGNIFICANT CHANGE UP (ref 3.8–10.5)
WBC # FLD AUTO: 8.21 K/UL — SIGNIFICANT CHANGE UP (ref 3.8–10.5)

## 2022-11-20 PROCEDURE — 95720 EEG PHY/QHP EA INCR W/VEEG: CPT

## 2022-11-20 PROCEDURE — 99291 CRITICAL CARE FIRST HOUR: CPT

## 2022-11-20 PROCEDURE — 71045 X-RAY EXAM CHEST 1 VIEW: CPT | Mod: 26

## 2022-11-20 RX ORDER — LACOSAMIDE 50 MG/1
150 TABLET ORAL
Refills: 0 | Status: DISCONTINUED | OUTPATIENT
Start: 2022-11-20 | End: 2022-11-21

## 2022-11-20 RX ORDER — ACETAMINOPHEN 500 MG
650 TABLET ORAL ONCE
Refills: 0 | Status: COMPLETED | OUTPATIENT
Start: 2022-11-20 | End: 2022-11-20

## 2022-11-20 RX ORDER — LEVETIRACETAM 250 MG/1
1000 TABLET, FILM COATED ORAL
Refills: 0 | Status: DISCONTINUED | OUTPATIENT
Start: 2022-11-20 | End: 2022-12-12

## 2022-11-20 RX ADMIN — LEVETIRACETAM 400 MILLIGRAM(S): 250 TABLET, FILM COATED ORAL at 14:55

## 2022-11-20 RX ADMIN — Medication 81 MILLIGRAM(S): at 11:16

## 2022-11-20 RX ADMIN — CHLORHEXIDINE GLUCONATE 15 MILLILITER(S): 213 SOLUTION TOPICAL at 00:00

## 2022-11-20 RX ADMIN — HEPARIN SODIUM 5000 UNIT(S): 5000 INJECTION INTRAVENOUS; SUBCUTANEOUS at 05:21

## 2022-11-20 RX ADMIN — LEVETIRACETAM 400 MILLIGRAM(S): 250 TABLET, FILM COATED ORAL at 05:46

## 2022-11-20 RX ADMIN — Medication 650 MILLIGRAM(S): at 12:57

## 2022-11-20 RX ADMIN — HEPARIN SODIUM 5000 UNIT(S): 5000 INJECTION INTRAVENOUS; SUBCUTANEOUS at 21:25

## 2022-11-20 RX ADMIN — Medication 8: at 12:58

## 2022-11-20 RX ADMIN — MIDAZOLAM HYDROCHLORIDE 2 MILLIGRAM(S): 1 INJECTION, SOLUTION INTRAMUSCULAR; INTRAVENOUS at 18:33

## 2022-11-20 RX ADMIN — LEVETIRACETAM 400 MILLIGRAM(S): 250 TABLET, FILM COATED ORAL at 21:25

## 2022-11-20 RX ADMIN — HEPARIN SODIUM 5000 UNIT(S): 5000 INJECTION INTRAVENOUS; SUBCUTANEOUS at 14:54

## 2022-11-20 RX ADMIN — Medication 6: at 18:00

## 2022-11-20 RX ADMIN — CHLORHEXIDINE GLUCONATE 1 APPLICATION(S): 213 SOLUTION TOPICAL at 00:00

## 2022-11-20 RX ADMIN — LACOSAMIDE 130 MILLIGRAM(S): 50 TABLET ORAL at 22:13

## 2022-11-20 RX ADMIN — INSULIN GLARGINE 20 UNIT(S): 100 INJECTION, SOLUTION SUBCUTANEOUS at 21:26

## 2022-11-20 RX ADMIN — Medication 8: at 23:19

## 2022-11-20 RX ADMIN — LACOSAMIDE 130 MILLIGRAM(S): 50 TABLET ORAL at 14:54

## 2022-11-20 RX ADMIN — Medication 6: at 05:21

## 2022-11-20 RX ADMIN — CHLORHEXIDINE GLUCONATE 15 MILLILITER(S): 213 SOLUTION TOPICAL at 18:03

## 2022-11-20 RX ADMIN — Medication 650 MILLIGRAM(S): at 11:16

## 2022-11-20 RX ADMIN — INSULIN GLARGINE 20 UNIT(S): 100 INJECTION, SOLUTION SUBCUTANEOUS at 08:41

## 2022-11-20 RX ADMIN — LACOSAMIDE 140 MILLIGRAM(S): 50 TABLET ORAL at 06:08

## 2022-11-20 NOTE — PROGRESS NOTE ADULT - SUBJECTIVE AND OBJECTIVE BOX
# CC: Patient is a 72y old  Male who presents with a chief complaint of s/p cardiac arrest, hypoglycemia (19 Nov 2022 21:08)      ## HPI:  Mr. Hamilton is a 72 year old male with a PMH of lacunar infarct, CHF (EF ~40% per son), CAD s/p PCI with stent and CABGx3 (~2020), s/p PPM (medtronic), HTN, COPD, DMII on insulin and PO meds, BPH, and CKD (stage II?) presenting to ICU s/p cardiac arrest in ED. Per patient son he has recently been doing well and in his normal state of health, however, yesterday Mr. Hamilton started c/o low blood sugars on glucometer. Pt reportedly continued to take his home medications, including both oral antidiabetic agents and insulin (both long and short acting). Today his son found him minimally responsive hanging off his bed, checked his glucose and it read "low," so he called 911. In ED pt was noted to be hypothermic with glucose noted to be 70 just prior to PEA arrest. Pt was coded for ~5 minutes with 2 epi given with ROSC. Pt intubated at that time. is moving all extremities equally and with reactive, equal pupils. He remained hypothermic and is not requiring vasopressor support. Pending CT Scans. ROS otherwise negative per son besides hypoglycemia, with no known sick contacts. Pt accepted to ICU for further management / care.  (14 Nov 2022 01:33)      **O/N:**    ## ROS:    ## Labs:  ** CBC: **                        14.6   8.21  )-----------( 234      ( 20 Nov 2022 02:25 )             45.3     ** Chem:  **  11-20    154<H>  |  123<H>  |  61<H>  ----------------------------<  340<H>  4.7   |  26  |  1.66<H>    Ca    9.7      20 Nov 2022 02:25  Phos  2.5     11-20  Mg     3.2     11-20    TPro  7.1  /  Alb  2.4<L>  /  TBili  0.6  /  DBili  x   /  AST  47<H>  /  ALT  72  /  AlkPhos  80  11-20    ** Coags: **      CAPILLARY BLOOD GLUCOSE      POCT Blood Glucose.: 305 mg/dL (20 Nov 2022 11:54)  POCT Blood Glucose.: 296 mg/dL (20 Nov 2022 05:07)  POCT Blood Glucose.: 336 mg/dL (19 Nov 2022 23:04)  POCT Blood Glucose.: 339 mg/dL (19 Nov 2022 19:52)  POCT Blood Glucose.: 355 mg/dL (19 Nov 2022 17:17)        Culture - Blood (collected 19 Nov 2022 09:15)  Source: .Blood Blood  Preliminary Report (20 Nov 2022 13:01):    No growth to date.    Culture - Blood (collected 18 Nov 2022 04:02)  Source: .Blood Blood  Preliminary Report (20 Nov 2022 13:01):    No growth to date.    Culture - Sputum (collected 18 Nov 2022 03:12)  Source: ET Tube ET Tube  Gram Stain (prelim) (20 Nov 2022 10:06):    Few polymorphonuclear leukocytes per low power field    Few Squamous epithelial cells per low power field    Moderate Yeast per oil power field    Few Gram Positive Cocci in Clusters per oil power field  Preliminary Report (20 Nov 2022 10:06):    Normal Respiratory Larissa present    Culture - Sputum (collected 14 Nov 2022 12:20)  Source: ET Tube ET Tube  Gram Stain (16 Nov 2022 19:10):    Moderate polymorphonuclear leukocytes per low power field    Rare Squamous epithelial cells per low power field    No organisms seen per oil power field  Final Report (18 Nov 2022 10:17):    Few Aspergillus fumigatus complex    Normal Respiratory Larissa present    Culture - Urine (collected 13 Nov 2022 23:19)  Source: Clean Catch Clean Catch (Midstream)  Final Report (15 Nov 2022 07:21):    No growth    Culture - Blood (collected 13 Nov 2022 23:00)  Source: .Blood Blood-Peripheral  Final Report (19 Nov 2022 09:00):    No Growth Final    Culture - Blood (collected 13 Nov 2022 22:45)  Source: .Blood Blood-Peripheral  Final Report (19 Nov 2022 09:00):    No Growth Final        ## Imaging:    ## Medications:        lacosamide IVPB 150 milliGRAM(s) IV Intermittent <User Schedule>  levETIRAcetam  IVPB 1000 milliGRAM(s) IV Intermittent <User Schedule>  midazolam Injectable 2 milliGRAM(s) IV Push every 1 hour PRN      aspirin  chewable 81 milliGRAM(s) Oral daily  heparin   Injectable 5000 Unit(s) SubCutaneous every 8 hours        insulin glargine Injectable (LANTUS) 20 Unit(s) SubCutaneous every morning  insulin glargine Injectable (LANTUS) 20 Unit(s) SubCutaneous at bedtime  insulin lispro (ADMELOG) corrective regimen sliding scale   SubCutaneous every 6 hours          ## O/E:  ICU Vital Signs Last 24 Hrs  T(C): 39.5 (20 Nov 2022 15:00), Max: 39.5 (20 Nov 2022 15:00)  T(F): 103.1 (20 Nov 2022 15:00), Max: 103.1 (20 Nov 2022 15:00)  HR: 90 (20 Nov 2022 16:33) (80 - 100)  BP: 131/75 (20 Nov 2022 15:30) (123/75 - 148/80)  BP(mean): 87 (20 Nov 2022 15:30) (83 - 107)  ABP: --  ABP(mean): --  RR: 18 (20 Nov 2022 15:30) (17 - 28)  SpO2: 99% (20 Nov 2022 16:33) (95% - 99%)    O2 Parameters below as of 19 Nov 2022 20:00  Patient On (Oxygen Delivery Method): ventilator          I&O's Summary    19 Nov 2022 07:01  -  20 Nov 2022 07:00  --------------------------------------------------------  IN: 1920 mL / OUT: 2040 mL / NET: -120 mL    20 Nov 2022 07:01  -  20 Nov 2022 16:42  --------------------------------------------------------  IN: 1010 mL / OUT: 1175 mL / NET: -165 mL      Mode: AC/ CMV (Assist Control/ Continuous Mandatory Ventilation), RR (machine): 14, TV (machine): 450, FiO2: 40, PEEP: 5, ITime: 1, MAP: 9, PIP: 23  Gen: lying comfortably in bed in no apparent distress  HEENT: PERRL, EOMI  Resp: CTA B/L no c/r/w  CVS: S1S2 no m/r/g  Abd: soft NT/ND +BS  Ext: no c/c/e  Neuro: A&Ox3    ## Code status:  Goals of care discussion: [x] yes [ ] no  [x] full code  [ ] DNR  [ ] DNI  [ ] MOLST # CC: Patient is a 72y old  Male who presents with a chief complaint of s/p cardiac arrest, hypoglycemia (19 Nov 2022 21:08)      ## HPI:  Mr. Hamilton is a 72 year old male with a PMH of lacunar infarct, CHF (EF ~40% per son), CAD s/p PCI with stent and CABGx3 (~2020), s/p PPM (medtronic), HTN, COPD, DMII on insulin and PO meds, BPH, and CKD (stage II?) presenting to ICU s/p cardiac arrest in ED. Per patient son he has recently been doing well and in his normal state of health, however, yesterday Mr. Hamilton started c/o low blood sugars on glucometer. Pt reportedly continued to take his home medications, including both oral antidiabetic agents and insulin (both long and short acting). Today his son found him minimally responsive hanging off his bed, checked his glucose and it read "low," so he called 911. In ED pt was noted to be hypothermic with glucose noted to be 70 just prior to PEA arrest. Pt was coded for ~5 minutes with 2 epi given with ROSC. Pt intubated at that time. is moving all extremities equally and with reactive, equal pupils. He remained hypothermic and is not requiring vasopressor support. Pending CT Scans. ROS otherwise negative per son besides hypoglycemia, with no known sick contacts. Pt accepted to ICU for further management / care.  (14 Nov 2022 01:33)      **O/N:**  Remains intubated    ## ROS:  Unobtainable due to mental status and intubation    ## Labs:  ** CBC: **                        14.6   8.21  )-----------( 234      ( 20 Nov 2022 02:25 )             45.3     ** Chem:  **  11-20    154<H>  |  123<H>  |  61<H>  ----------------------------<  340<H>  4.7   |  26  |  1.66<H>    Ca    9.7      20 Nov 2022 02:25  Phos  2.5     11-20  Mg     3.2     11-20    TPro  7.1  /  Alb  2.4<L>  /  TBili  0.6  /  DBili  x   /  AST  47<H>  /  ALT  72  /  AlkPhos  80  11-20    ** Coags: **      CAPILLARY BLOOD GLUCOSE      POCT Blood Glucose.: 305 mg/dL (20 Nov 2022 11:54)  POCT Blood Glucose.: 296 mg/dL (20 Nov 2022 05:07)  POCT Blood Glucose.: 336 mg/dL (19 Nov 2022 23:04)  POCT Blood Glucose.: 339 mg/dL (19 Nov 2022 19:52)  POCT Blood Glucose.: 355 mg/dL (19 Nov 2022 17:17)        Culture - Blood (collected 19 Nov 2022 09:15)  Source: .Blood Blood  Preliminary Report (20 Nov 2022 13:01):    No growth to date.    Culture - Blood (collected 18 Nov 2022 04:02)  Source: .Blood Blood  Preliminary Report (20 Nov 2022 13:01):    No growth to date.    Culture - Sputum (collected 18 Nov 2022 03:12)  Source: ET Tube ET Tube  Gram Stain (prelim) (20 Nov 2022 10:06):    Few polymorphonuclear leukocytes per low power field    Few Squamous epithelial cells per low power field    Moderate Yeast per oil power field    Few Gram Positive Cocci in Clusters per oil power field  Preliminary Report (20 Nov 2022 10:06):    Normal Respiratory Larissa present    Culture - Sputum (collected 14 Nov 2022 12:20)  Source: ET Tube ET Tube  Gram Stain (16 Nov 2022 19:10):    Moderate polymorphonuclear leukocytes per low power field    Rare Squamous epithelial cells per low power field    No organisms seen per oil power field  Final Report (18 Nov 2022 10:17):    Few Aspergillus fumigatus complex    Normal Respiratory Larissa present    Culture - Urine (collected 13 Nov 2022 23:19)  Source: Clean Catch Clean Catch (Midstream)  Final Report (15 Nov 2022 07:21):    No growth    Culture - Blood (collected 13 Nov 2022 23:00)  Source: .Blood Blood-Peripheral  Final Report (19 Nov 2022 09:00):    No Growth Final    Culture - Blood (collected 13 Nov 2022 22:45)  Source: .Blood Blood-Peripheral  Final Report (19 Nov 2022 09:00):    No Growth Final        ## Imaging:    ## Medications:        lacosamide IVPB 150 milliGRAM(s) IV Intermittent <User Schedule>  levETIRAcetam  IVPB 1000 milliGRAM(s) IV Intermittent <User Schedule>  midazolam Injectable 2 milliGRAM(s) IV Push every 1 hour PRN      aspirin  chewable 81 milliGRAM(s) Oral daily  heparin   Injectable 5000 Unit(s) SubCutaneous every 8 hours        insulin glargine Injectable (LANTUS) 20 Unit(s) SubCutaneous every morning  insulin glargine Injectable (LANTUS) 20 Unit(s) SubCutaneous at bedtime  insulin lispro (ADMELOG) corrective regimen sliding scale   SubCutaneous every 6 hours          ## O/E:  ICU Vital Signs Last 24 Hrs  T(C): 39.5 (20 Nov 2022 15:00), Max: 39.5 (20 Nov 2022 15:00)  T(F): 103.1 (20 Nov 2022 15:00), Max: 103.1 (20 Nov 2022 15:00)  HR: 90 (20 Nov 2022 16:33) (80 - 100)  BP: 131/75 (20 Nov 2022 15:30) (123/75 - 148/80)  BP(mean): 87 (20 Nov 2022 15:30) (83 - 107)  ABP: --  ABP(mean): --  RR: 18 (20 Nov 2022 15:30) (17 - 28)  SpO2: 99% (20 Nov 2022 16:33) (95% - 99%)    O2 Parameters below as of 19 Nov 2022 20:00  Patient On (Oxygen Delivery Method): ventilator          I&O's Summary    19 Nov 2022 07:01  -  20 Nov 2022 07:00  --------------------------------------------------------  IN: 1920 mL / OUT: 2040 mL / NET: -120 mL    20 Nov 2022 07:01  -  20 Nov 2022 16:42  --------------------------------------------------------  IN: 1010 mL / OUT: 1175 mL / NET: -165 mL      Mode: AC/ CMV (Assist Control/ Continuous Mandatory Ventilation), RR (machine): 14, TV (machine): 450, FiO2: 40, PEEP: 5, ITime: 1, MAP: 9, PIP: 23  Gen: orotracheally intubated on full vent  HEENT: PERRL  Resp: mechanical breath sounds B/L  CVS: S1S2 no m/r/g  Abd: soft NT/ND +BS  Ext: no c/c/e  Neuro: minimally responsive off sedation    ## Code status:  Goals of care discussion: [x] yes [ ] no  [x] full code  [ ] DNR  [ ] DNI  [ ] MOLST

## 2022-11-20 NOTE — PROGRESS NOTE ADULT - ASSESSMENT
- Remains intubated with poor mental status 14/450/40%/+5  - attempted SBT, but RR 45  on 10/5. Continue daily attempts  - Per neuro, based on EEG unclear if patient is seizing, although does not appear to be doing so. Change dosing of Keppra and Vimpat to increase frequency of dosing but keep total overall dose the same  - Lantus increased for persistent hyperglycemia  - Persistent fevers likely neurological in nature. Trend procalcitonin and consider empiric antibiotics if significantly elevated from prior  - DVT ppx with Hep SC  - Overall prognosis appears poor. Discussions with family at bedside ongoing.    I have personally provided 45 minutes of critical care time.  72M with PEA arrest ?hypoglycemia with seizure-like activity  - Remains intubated with poor mental status 14/450/40%/+5  - attempted SBT, but RR 45  on 10/5. Continue daily attempts  - Per neuro, based on EEG unclear if patient is seizing, although does not appear to be doing so. Change dosing of Keppra and Vimpat to increase frequency of dosing but keep total overall dose the same  - Lantus increased for persistent hyperglycemia, avoid hypoglycemia  - Persistent fevers likely neurological in nature. Trend procalcitonin and consider empiric antibiotics if significantly elevated from prior  - DVT ppx with Hep SC  - Overall prognosis appears poor. Discussions with family at bedside ongoing.    I have personally provided 45 minutes of critical care time.

## 2022-11-20 NOTE — EEG REPORT - NS EEG TEXT BOX
Start date/time: 11/19/22 08:00  End date/time: 11/20/22 08:00  Duration: 24 hrs  Study Interpretation:    FINDINGS:      Background:  Symmetry: symmetric   Continuous: continuous  PDR: absent   Reactivity: absent   Voltage: normal, mostly 20-150uV  Anterior Posterior Gradient: absent   Breach: absent    Background Slowing:  Generalized slowing: diffuse theta and delta activity present     Focal slowing:    Continuous left hemispheric delta and theta    State Changes:   Absent    Rhythmic and Periodic Patterns (RPPs):  Abundant lateralized periodic discharges predominantly over the left parietal region max P3 field at times to P4 Cz, mostly 1-2hz, at times occurring as BIRDs for 3-4secs at frequencies up to 4-5hz with no clinical correlate or evolution.      Electrographic and Electroclinical seizures:  Discharges at times more broadly distributed, some twitching with LPDs electrographically may suggest intermittent myoclonus, difficult to appreciate on video    Other Clinical Events:  None    Activation Procedures:   -Hyperventilation was not performed.    -Photic stimulation was performed and did not elicit any abnormalities.      Artifacts:  Intermittent myogenic and movement artifacts were noted.    ECG:  ECG lead w significant artifact     EEG Classification / Summary:    Abnormal EEG awake / drowsy / sleep  1. LPDs in left parietal region, mostly 1-2hz, at times occurring as BIRDs for 3-4secs at frequencies up to 4-5hz.   Some twitching with LPDs electrographically may suggest intermittent myoclonus, difficult to appreciate on video  2. Focal slowing in left hemisphere  3. Background slowing, moderately severe, generalized     Clinical Impression:  Findings indicate:    1. Evidence for left parietal lesion with high risk for seizures. Subtle myoclonic seizure activity suspected intermittently with some of the LPDs. Primary team notified.   2. Moderate diffuse/multifocal cerebral dysfunction, not specific as to etiology.    This is a prelim report only, pending review with attending prior to finalization.    Montrell Ashby MD  Epilepsy Fellow , [facility]  Department of Neurology, [facility] [employer] [facility] [employer]    [facility] EEG Reading Room Ph#: [phone]  Epilepsy Answering Service after 5PM and before 8:30AM: Ph#: [phone]   Start date/time: 11/19/22 08:00  End date/time: 11/20/22 08:00  Duration: 24 hrs  Study Interpretation:    FINDINGS:      Background:  Symmetry: symmetric   Continuous: continuous  PDR: absent   Reactivity: absent   Voltage: normal, mostly 20-150uV  Anterior Posterior Gradient: absent   Breach: absent    Background Slowing:  Generalized slowing: diffuse theta and delta activity present     Focal slowing:    Continuous left hemispheric delta and theta    State Changes:   Absent    Rhythmic and Periodic Patterns (RPPs):  Abundant lateralized periodic discharges predominantly over the left parietal region max P3 field at times to P4 Cz, mostly 1-2hz, at times occurring as BIRDs for 3-4secs at frequencies up to 4-5hz with no clinical correlate or evolution.      Electrographic and Electroclinical seizures:  Discharges at times more broadly distributed, some twitching with LPDs electrographically may suggest intermittent myoclonus, difficult to appreciate on video    Other Clinical Events:  None    Activation Procedures:   -Hyperventilation was not performed.    -Photic stimulation was performed and did not elicit any abnormalities.      Artifacts:  Intermittent myogenic and movement artifacts were noted.    ECG:  ECG lead w significant artifact     EEG Classification / Summary:    Abnormal EEG awake / drowsy / sleep  1. LPDs in left parietal region, mostly 1-2hz, at times occurring as BIRDs for 3-4secs at frequencies up to 4-5hz.   Some twitching with LPDs electrographically may suggest intermittent myoclonus, difficult to appreciate on video  2. Focal slowing in left hemisphere  3. Background slowing, moderately severe, generalized     Clinical Impression:  Findings indicate:    1. Evidence for left parietal lesion with high risk for seizures. Subtle myoclonic seizure activity suspected intermittently with some of the LPDs. Neurology consult attending made aware.   2. Moderate diffuse/multifocal cerebral dysfunction, not specific as to etiology.    This is a prelim report only, pending review with attending prior to finalization.    Montrell Ashby MD  Epilepsy Fellow , [facility]  Department of Neurology, [facility] [employer]    [facility] EEG Reading Room Ph#: [phone]  Epilepsy Answering Service after 5PM and before 8:30AM: Ph#: [phone]   Start date/time: 11/19/22 08:00  End date/time: 11/20/22 08:00  Duration: 24 hrs  Study Interpretation:    FINDINGS:      Background:  Symmetry: symmetric   Continuous: continuous  PDR: absent   Reactivity: absent   Voltage: normal, mostly 20-150uV  Anterior Posterior Gradient: absent   Breach: absent    Background Slowing:  Generalized slowing: diffuse theta and delta activity present     Focal slowing:    Continuous left hemispheric delta and theta    State Changes:   Absent    Rhythmic and Periodic Patterns (RPPs):  Abundant lateralized periodic discharges predominantly over the left parietal region max P3 field at times to P4 Cz, mostly 1-2hz, at times occurring as BIRDs for 3-4secs at frequencies up to 4-5hz with no clinical correlate or evolution.      Electrographic and Electroclinical seizures:  Discharges at times more broadly distributed, some twitching with LPDs electrographically may suggest intermittent myoclonus, difficult to appreciate on video    Other Clinical Events:  None    Activation Procedures:   -Hyperventilation was not performed.    -Photic stimulation was performed and did not elicit any abnormalities.      Artifacts:  Intermittent myogenic and movement artifacts were noted.    ECG:  ECG lead w significant artifact     EEG Classification / Summary:    Abnormal EEG awake / drowsy / sleep  1. LPDs in left parietal region, mostly 1-2hz, at times occurring as BIRDs for 3-4secs at frequencies up to 4-5hz.   Some twitching with LPDs electrographically may suggest intermittent myoclonus, difficult to appreciate on video  2. Focal slowing in left hemisphere  3. Background slowing, moderately severe, generalized     Clinical Impression:  Findings indicate:    1. Evidence for left parietal lesion with high risk for seizures. Subtle myoclonic seizure activity suspected intermittently with some of the LPDs. Neurology consult attending made aware.   2. Moderate diffuse/multifocal cerebral dysfunction, not specific as to etiology.      Montrell Ashby MD  Epilepsy Fellow , [facility]  Department of Neurology, [facility] [employer]    [facility] EEG Reading Room Ph#: [phone]  Epilepsy Answering Service after 5PM and before 8:30AM: Ph#: [phone]

## 2022-11-20 NOTE — INITIAL ORGAN DONATION REFERRAL - NSORGANDONATIONCLINICALTRIGGER_GEN_ALL_CORE
Please call [facility] will any of the following:    -further patient deterioration  -plans for withdrawal of care  -plans for formal brain death testing    [facility] is happy to make this call for you, please elert for assistance./[place] Coma Scale is less than or equal to 5

## 2022-11-21 LAB
ALBUMIN SERPL ELPH-MCNC: 2.5 G/DL — LOW (ref 3.3–5)
ALP SERPL-CCNC: 82 U/L — SIGNIFICANT CHANGE UP (ref 40–120)
ALT FLD-CCNC: 107 U/L — HIGH (ref 12–78)
ANION GAP SERPL CALC-SCNC: 7 MMOL/L — SIGNIFICANT CHANGE UP (ref 5–17)
AST SERPL-CCNC: 58 U/L — HIGH (ref 15–37)
BILIRUB SERPL-MCNC: 0.6 MG/DL — SIGNIFICANT CHANGE UP (ref 0.2–1.2)
BUN SERPL-MCNC: 71 MG/DL — HIGH (ref 7–23)
CALCIUM SERPL-MCNC: 9 MG/DL — SIGNIFICANT CHANGE UP (ref 8.5–10.1)
CHLORIDE SERPL-SCNC: 122 MMOL/L — HIGH (ref 96–108)
CO2 SERPL-SCNC: 29 MMOL/L — SIGNIFICANT CHANGE UP (ref 22–31)
CREAT SERPL-MCNC: 1.43 MG/DL — HIGH (ref 0.5–1.3)
EGFR: 51 ML/MIN/1.73M2 — LOW
GLUCOSE BLDC GLUCOMTR-MCNC: 191 MG/DL — HIGH (ref 70–99)
GLUCOSE BLDC GLUCOMTR-MCNC: 238 MG/DL — HIGH (ref 70–99)
GLUCOSE BLDC GLUCOMTR-MCNC: 252 MG/DL — HIGH (ref 70–99)
GLUCOSE BLDC GLUCOMTR-MCNC: 263 MG/DL — HIGH (ref 70–99)
GLUCOSE SERPL-MCNC: 300 MG/DL — HIGH (ref 70–99)
HCT VFR BLD CALC: 43.7 % — SIGNIFICANT CHANGE UP (ref 39–50)
HGB BLD-MCNC: 13.8 G/DL — SIGNIFICANT CHANGE UP (ref 13–17)
MAGNESIUM SERPL-MCNC: 3.2 MG/DL — HIGH (ref 1.6–2.6)
MCHC RBC-ENTMCNC: 30.1 PG — SIGNIFICANT CHANGE UP (ref 27–34)
MCHC RBC-ENTMCNC: 31.6 G/DL — LOW (ref 32–36)
MCV RBC AUTO: 95.4 FL — SIGNIFICANT CHANGE UP (ref 80–100)
NRBC # BLD: 0 /100 WBCS — SIGNIFICANT CHANGE UP (ref 0–0)
PHOSPHATE SERPL-MCNC: 2.7 MG/DL — SIGNIFICANT CHANGE UP (ref 2.5–4.5)
PLATELET # BLD AUTO: 231 K/UL — SIGNIFICANT CHANGE UP (ref 150–400)
POTASSIUM SERPL-MCNC: 4.4 MMOL/L — SIGNIFICANT CHANGE UP (ref 3.5–5.3)
POTASSIUM SERPL-SCNC: 4.4 MMOL/L — SIGNIFICANT CHANGE UP (ref 3.5–5.3)
PROT SERPL-MCNC: 6.6 GM/DL — SIGNIFICANT CHANGE UP (ref 6–8.3)
RBC # BLD: 4.58 M/UL — SIGNIFICANT CHANGE UP (ref 4.2–5.8)
RBC # FLD: 14.6 % — HIGH (ref 10.3–14.5)
SODIUM SERPL-SCNC: 158 MMOL/L — HIGH (ref 135–145)
WBC # BLD: 9.49 K/UL — SIGNIFICANT CHANGE UP (ref 3.8–10.5)
WBC # FLD AUTO: 9.49 K/UL — SIGNIFICANT CHANGE UP (ref 3.8–10.5)

## 2022-11-21 PROCEDURE — 99291 CRITICAL CARE FIRST HOUR: CPT

## 2022-11-21 PROCEDURE — 99233 SBSQ HOSP IP/OBS HIGH 50: CPT

## 2022-11-21 PROCEDURE — 95720 EEG PHY/QHP EA INCR W/VEEG: CPT

## 2022-11-21 RX ORDER — LACOSAMIDE 50 MG/1
100 TABLET ORAL
Refills: 0 | Status: DISCONTINUED | OUTPATIENT
Start: 2022-11-21 | End: 2022-11-26

## 2022-11-21 RX ORDER — INSULIN GLARGINE 100 [IU]/ML
20 INJECTION, SOLUTION SUBCUTANEOUS EVERY MORNING
Refills: 0 | Status: DISCONTINUED | OUTPATIENT
Start: 2022-11-21 | End: 2023-01-21

## 2022-11-21 RX ORDER — BACITRACIN ZINC 500 UNIT/G
1 OINTMENT IN PACKET (EA) TOPICAL ONCE
Refills: 0 | Status: COMPLETED | OUTPATIENT
Start: 2022-11-21 | End: 2022-11-21

## 2022-11-21 RX ORDER — VALPROIC ACID (AS SODIUM SALT) 250 MG/5ML
1000 SOLUTION, ORAL ORAL ONCE
Refills: 0 | Status: COMPLETED | OUTPATIENT
Start: 2022-11-21 | End: 2022-11-21

## 2022-11-21 RX ORDER — SENNA PLUS 8.6 MG/1
2 TABLET ORAL AT BEDTIME
Refills: 0 | Status: DISCONTINUED | OUTPATIENT
Start: 2022-11-21 | End: 2023-02-08

## 2022-11-21 RX ORDER — INSULIN LISPRO 100/ML
8 VIAL (ML) SUBCUTANEOUS EVERY 6 HOURS
Refills: 0 | Status: DISCONTINUED | OUTPATIENT
Start: 2022-11-21 | End: 2022-11-25

## 2022-11-21 RX ORDER — DIVALPROEX SODIUM 500 MG/1
500 TABLET, DELAYED RELEASE ORAL
Refills: 0 | Status: DISCONTINUED | OUTPATIENT
Start: 2022-11-21 | End: 2022-11-21

## 2022-11-21 RX ORDER — INSULIN LISPRO 100/ML
VIAL (ML) SUBCUTANEOUS EVERY 6 HOURS
Refills: 0 | Status: DISCONTINUED | OUTPATIENT
Start: 2022-11-21 | End: 2023-01-21

## 2022-11-21 RX ORDER — VALPROIC ACID (AS SODIUM SALT) 250 MG/5ML
500 SOLUTION, ORAL ORAL EVERY 12 HOURS
Refills: 0 | Status: DISCONTINUED | OUTPATIENT
Start: 2022-11-21 | End: 2022-11-23

## 2022-11-21 RX ORDER — VALPROIC ACID (AS SODIUM SALT) 250 MG/5ML
500 SOLUTION, ORAL ORAL EVERY 8 HOURS
Refills: 0 | Status: DISCONTINUED | OUTPATIENT
Start: 2022-11-21 | End: 2022-11-21

## 2022-11-21 RX ORDER — POLYETHYLENE GLYCOL 3350 17 G/17G
17 POWDER, FOR SOLUTION ORAL DAILY
Refills: 0 | Status: DISCONTINUED | OUTPATIENT
Start: 2022-11-21 | End: 2023-02-08

## 2022-11-21 RX ORDER — INSULIN GLARGINE 100 [IU]/ML
20 INJECTION, SOLUTION SUBCUTANEOUS AT BEDTIME
Refills: 0 | Status: DISCONTINUED | OUTPATIENT
Start: 2022-11-21 | End: 2022-12-04

## 2022-11-21 RX ADMIN — Medication 81 MILLIGRAM(S): at 11:35

## 2022-11-21 RX ADMIN — INSULIN GLARGINE 20 UNIT(S): 100 INJECTION, SOLUTION SUBCUTANEOUS at 09:48

## 2022-11-21 RX ADMIN — LACOSAMIDE 130 MILLIGRAM(S): 50 TABLET ORAL at 15:05

## 2022-11-21 RX ADMIN — LACOSAMIDE 130 MILLIGRAM(S): 50 TABLET ORAL at 05:21

## 2022-11-21 RX ADMIN — Medication 6: at 11:34

## 2022-11-21 RX ADMIN — Medication 1 APPLICATION(S): at 17:40

## 2022-11-21 RX ADMIN — POLYETHYLENE GLYCOL 3350 17 GRAM(S): 17 POWDER, FOR SOLUTION ORAL at 11:35

## 2022-11-21 RX ADMIN — CHLORHEXIDINE GLUCONATE 15 MILLILITER(S): 213 SOLUTION TOPICAL at 02:20

## 2022-11-21 RX ADMIN — Medication 2: at 17:15

## 2022-11-21 RX ADMIN — Medication 8 UNIT(S): at 11:34

## 2022-11-21 RX ADMIN — INSULIN GLARGINE 20 UNIT(S): 100 INJECTION, SOLUTION SUBCUTANEOUS at 21:48

## 2022-11-21 RX ADMIN — INSULIN GLARGINE 20 UNIT(S): 100 INJECTION, SOLUTION SUBCUTANEOUS at 10:00

## 2022-11-21 RX ADMIN — Medication 500 MILLIGRAM(S): at 17:14

## 2022-11-21 RX ADMIN — LEVETIRACETAM 400 MILLIGRAM(S): 250 TABLET, FILM COATED ORAL at 13:20

## 2022-11-21 RX ADMIN — Medication 6: at 05:34

## 2022-11-21 RX ADMIN — HEPARIN SODIUM 5000 UNIT(S): 5000 INJECTION INTRAVENOUS; SUBCUTANEOUS at 05:21

## 2022-11-21 RX ADMIN — CHLORHEXIDINE GLUCONATE 15 MILLILITER(S): 213 SOLUTION TOPICAL at 17:14

## 2022-11-21 RX ADMIN — HEPARIN SODIUM 5000 UNIT(S): 5000 INJECTION INTRAVENOUS; SUBCUTANEOUS at 13:19

## 2022-11-21 RX ADMIN — CHLORHEXIDINE GLUCONATE 1 APPLICATION(S): 213 SOLUTION TOPICAL at 02:20

## 2022-11-21 RX ADMIN — HEPARIN SODIUM 5000 UNIT(S): 5000 INJECTION INTRAVENOUS; SUBCUTANEOUS at 21:47

## 2022-11-21 RX ADMIN — SENNA PLUS 2 TABLET(S): 8.6 TABLET ORAL at 21:49

## 2022-11-21 RX ADMIN — Medication 10 MILLIGRAM(S): at 11:36

## 2022-11-21 RX ADMIN — Medication 60 MILLIGRAM(S): at 11:35

## 2022-11-21 RX ADMIN — LEVETIRACETAM 400 MILLIGRAM(S): 250 TABLET, FILM COATED ORAL at 05:58

## 2022-11-21 RX ADMIN — LEVETIRACETAM 400 MILLIGRAM(S): 250 TABLET, FILM COATED ORAL at 21:47

## 2022-11-21 RX ADMIN — LACOSAMIDE 120 MILLIGRAM(S): 50 TABLET ORAL at 22:39

## 2022-11-21 RX ADMIN — Medication 8 UNIT(S): at 17:15

## 2022-11-21 NOTE — PROGRESS NOTE ADULT - SUBJECTIVE AND OBJECTIVE BOX
CHIEF COMPLAINT:    Interval Events:    REVIEW OF SYSTEMS:  Constitutional: [ ] fevers [ ] chills [ ] weight loss [ ] weight gain  HEENT: [ ] dry eyes [ ] eye irritation [ ] postnasal drip [ ] nasal congestion  CV: [ ] chest pain [ ] orthopnea [ ] palpitations [ ] murmur  Resp: [ ] cough [ ] shortness of breath [ ] dyspnea [ ] wheezing [ ] sputum [ ] hemoptysis  GI: [ ] nausea [ ] vomiting [ ] diarrhea [ ] constipation [ ] abd pain [ ] dysphagia   : [ ] dysuria [ ] nocturia [ ] hematuria [ ] increased urinary frequency  Musculoskeletal: [ ] back pain [ ] myalgias [ ] arthralgias [ ] fracture  Skin: [ ] rash [ ] itch  Neurological: [ ] headache [ ] dizziness [ ] syncope [ ] weakness [ ] numbness  Hematologic/Lymphatic: [ ] anemia [ ] bleeding problem  Allergic/Immunologic: [ ] itchy eyes [ ] nasal discharge [ ] hives [ ] angioedema  [ ] All other systems negative  [ ] Unable to assess ROS because ________    OBJECTIVE:  ICU Vital Signs Last 24 Hrs  T(C): 37.4 (21 Nov 2022 06:00), Max: 39.5 (20 Nov 2022 15:00)  T(F): 99.3 (21 Nov 2022 06:00), Max: 103.1 (20 Nov 2022 15:00)  HR: 89 (21 Nov 2022 07:00) (84 - 100)  BP: 133/81 (21 Nov 2022 07:00) (118/69 - 161/79)  BP(mean): 93 (21 Nov 2022 07:00) (79 - 106)  ABP: --  ABP(mean): --  RR: 16 (21 Nov 2022 07:00) (14 - 33)  SpO2: 100% (21 Nov 2022 07:00) (95% - 100%)    O2 Parameters below as of 21 Nov 2022 07:00  Patient On (Oxygen Delivery Method): ventilator    O2 Concentration (%): 40      Mode: AC/ CMV (Assist Control/ Continuous Mandatory Ventilation), RR (machine): 14, TV (machine): 450, FiO2: 40, PEEP: 5, ITime: 0.9, MAP: 9, PIP: 25    11-20 @ 07:01  -  11-21 @ 07:00  --------------------------------------------------------  IN: 2370 mL / OUT: 1575 mL / NET: 795 mL      CAPILLARY BLOOD GLUCOSE      POCT Blood Glucose.: 318 mg/dL (20 Nov 2022 23:06)      PHYSICAL EXAM:  General:   HEENT:   Neck:   Respiratory:   Cardiovascular:   Abdomen:   Extremities:   Skin:   Neurological:  Psychiatry:    LINES:    HOSPITAL MEDICATIONS:  MEDICATIONS  (STANDING):  aspirin  chewable 81 milliGRAM(s) Oral daily  chlorhexidine 0.12% Liquid 15 milliLiter(s) Oral Mucosa every 12 hours  chlorhexidine 2% Cloths 1 Application(s) Topical <User Schedule>  heparin   Injectable 5000 Unit(s) SubCutaneous every 8 hours  insulin glargine Injectable (LANTUS) 20 Unit(s) SubCutaneous every morning  insulin glargine Injectable (LANTUS) 20 Unit(s) SubCutaneous at bedtime  insulin lispro (ADMELOG) corrective regimen sliding scale   SubCutaneous every 6 hours  lacosamide IVPB 150 milliGRAM(s) IV Intermittent <User Schedule>  levETIRAcetam  IVPB 1000 milliGRAM(s) IV Intermittent <User Schedule>    MEDICATIONS  (PRN):  midazolam Injectable 2 milliGRAM(s) IV Push every 1 hour PRN agitation/seizure      LABS:                        13.8   9.49  )-----------( 231      ( 21 Nov 2022 02:45 )             43.7     Hgb Trend: 13.8<--, 14.6<--, 13.4<--, 13.7<--, 13.0<--  11-21    158<H>  |  122<H>  |  71<H>  ----------------------------<  300<H>  4.4   |  29  |  1.43<H>    Ca    9.0      21 Nov 2022 02:45  Phos  2.7     11-21  Mg     3.2     11-21    TPro  6.6  /  Alb  2.5<L>  /  TBili  0.6  /  DBili  x   /  AST  58<H>  /  ALT  107<H>  /  AlkPhos  82  11-21              MICROBIOLOGY:     RADIOLOGY:  [ ] Reviewed and interpreted by me CHIEF COMPLAINT:    Interval Events:  Tmax 101.2  EEG still showing spikes, added valproic acid    REVIEW OF SYSTEMS:  [ x] Unable to assess ROS because intubated/encephalopathy    OBJECTIVE:  ICU Vital Signs Last 24 Hrs  T(C): 37.4 (21 Nov 2022 06:00), Max: 39.5 (20 Nov 2022 15:00)  T(F): 99.3 (21 Nov 2022 06:00), Max: 103.1 (20 Nov 2022 15:00)  HR: 89 (21 Nov 2022 07:00) (84 - 100)  BP: 133/81 (21 Nov 2022 07:00) (118/69 - 161/79)  BP(mean): 93 (21 Nov 2022 07:00) (79 - 106)  ABP: --  ABP(mean): --  RR: 16 (21 Nov 2022 07:00) (14 - 33)  SpO2: 100% (21 Nov 2022 07:00) (95% - 100%)    O2 Parameters below as of 21 Nov 2022 07:00  Patient On (Oxygen Delivery Method): ventilator    O2 Concentration (%): 40      Mode: AC/ CMV (Assist Control/ Continuous Mandatory Ventilation), RR (machine): 14, TV (machine): 450, FiO2: 40, PEEP: 5, ITime: 0.9, MAP: 9, PIP: 25    11-20 @ 07:01  -  11-21 @ 07:00  --------------------------------------------------------  IN: 2370 mL / OUT: 1575 mL / NET: 795 mL      CAPILLARY BLOOD GLUCOSE      POCT Blood Glucose.: 318 mg/dL (20 Nov 2022 23:06)      PHYSICAL EXAM:  General: NAD, non toxic appearing  HEENT: ETT and NGT in place  Neck: supple  Respiratory: CTA b/l  Cardiovascular: s1s2 RRR  Abdomen: soft, non tender, non distended  Extremities: warm, no edema or clubbing  Skin: L ear injury and sacral wound  Neurological: opening eyes, slowly squeezing hands  Psychiatry: unable to assess    LINES:  [facility] in [place]    HOSPITAL MEDICATIONS:  MEDICATIONS  (STANDING):  aspirin  chewable 81 milliGRAM(s) Oral daily  chlorhexidine 0.12% Liquid 15 milliLiter(s) Oral Mucosa every 12 hours  chlorhexidine 2% Cloths 1 Application(s) Topical <User Schedule>  heparin   Injectable 5000 Unit(s) SubCutaneous every 8 hours  insulin glargine Injectable (LANTUS) 20 Unit(s) SubCutaneous every morning  insulin glargine Injectable (LANTUS) 20 Unit(s) SubCutaneous at bedtime  insulin lispro (ADMELOG) corrective regimen sliding scale   SubCutaneous every 6 hours  lacosamide IVPB 150 milliGRAM(s) IV Intermittent <User Schedule>  levETIRAcetam  IVPB 1000 milliGRAM(s) IV Intermittent <User Schedule>    MEDICATIONS  (PRN):  midazolam Injectable 2 milliGRAM(s) IV Push every 1 hour PRN agitation/seizure      LABS:                        13.8   9.49  )-----------( 231      ( 21 Nov 2022 02:45 )             43.7     Hgb Trend: 13.8<--, 14.6<--, 13.4<--, 13.7<--, 13.0<--  11-21    158<H>  |  122<H>  |  71<H>  ----------------------------<  300<H>  4.4   |  29  |  1.43<H>    Ca    9.0      21 Nov 2022 02:45  Phos  2.7     11-21  Mg     3.2     11-21    TPro  6.6  /  Alb  2.5<L>  /  TBili  0.6  /  DBili  x   /  AST  58<H>  /  ALT  107<H>  /  AlkPhos  82  11-21              MICROBIOLOGY:   Culture - Sputum . (11.14.22 @ 12:20)    Gram Stain:   Moderate polymorphonuclear leukocytes per low power field  Rare Squamous epithelial cells per low power field  No organisms seen per oil power field    Specimen Source: ET Tube ET Tube    Culture Results:   Few Aspergillus fumigatus complex  Normal Respiratory Larissa present          RADIOLOGY:  [ ] Reviewed and interpreted by me

## 2022-11-21 NOTE — EEG REPORT - NS EEG TEXT BOX
CONTINUOUS VIDEO EEG RECORDING     Start date/time: 11/20/22 08:00  End date/time: 11/21/22 08:00  Duration: 24 hrs  Study Interpretation:    FINDINGS:      Background:  Symmetry: Asymmetric   Continuous: continuous  PDR: absent   Reactivity: absent   Voltage: normal, mostly 20-150uV  Anterior Posterior Gradient: absent   Breach: absent    Background Slowing:  Generalized slowing: diffuse theta and delta activity present     Focal slowing:    Continuous left hemispheric delta and theta    State Changes:   Absent    Rhythmic and Periodic Patterns (RPPs):  Abundant lateralized periodic discharges predominantly over the left parietal region max P3 field at times to P4/Cz/Pz, mostly 1-2hz, at times occurring as BIRDs for 3-4secs at frequencies up to 4-5hz with no clinical correlate or evolution.      Electrographic and Electroclinical seizures:  Discharges at times more broadly distributed, some twitching with LPDs electrographically may suggest intermittent myoclonus, difficult to appreciate on video    Other Clinical Events:  None    Activation Procedures:   -Hyperventilation was not performed.    -Photic stimulation was performed and did not elicit any abnormalities.      Artifacts:  Intermittent myogenic and movement artifacts were noted.    ECG:  ECG lead w significant artifact     EEG Classification / Summary:    Abnormal EEG awake / drowsy / sleep  1. LPDs in left parietal region, mostly 1-2hz, at times occurring as BIRDs for 3-4secs at frequencies up to 4-5hz. Some twitching with LPDs electrographically may suggest intermittent myoclonus, difficult to appreciate on video  2. Focal slowing in left hemisphere  3. Background slowing, moderately severe, generalized     Clinical Impression:  Findings indicate:    1. Evidence for left parietal lesion with high risk for seizures. Subtle myoclonic seizure activity suspected intermittently with some of the LPDs.   2. Severe diffuse/multifocal cerebral dysfunction, not specific as to etiology.    Jerome Arias MD  Epilepsy Fellow , [facility]  Department of Neurology, [facility]    [facility] EEG Reading Room Ph#: [phone]  Epilepsy Answering Service after 5PM and before 8:30AM: Ph#: [phone]     CONTINUOUS VIDEO EEG RECORDING     Start date/time: 11/20/22 08:00  End date/time: 11/21/22 08:00  Duration: 24 hrs  Study Interpretation:    FINDINGS:      Background:  Symmetry: Asymmetric   Continuous: continuous  PDR: absent   Reactivity: absent   Voltage: normal, mostly 20-150uV  Anterior Posterior Gradient: absent   Breach: absent    Background Slowing:  Generalized slowing: diffuse theta and delta activity present     Focal slowing:    Continuous left hemispheric delta and theta    State Changes:   Absent    Rhythmic and Periodic Patterns (RPPs):  Abundant lateralized periodic discharges predominantly over the left parietal region max P3 field at times to P4/Cz/Pz, mostly 1-2hz, at times occurring as BIRDs for 3-4secs at frequencies up to 4-5hz with no clinical correlate or evolution.      Electrographic and Electroclinical seizures:  Discharges at times more broadly distributed, some twitching with LPDs electrographically may suggest intermittent myoclonus, difficult to appreciate on video    Other Clinical Events:  None    Activation Procedures:   -Hyperventilation was not performed.    -Photic stimulation was performed and did not elicit any abnormalities.      Artifacts:  Intermittent myogenic and movement artifacts were noted.    ECG:  ECG lead w significant artifact     EEG Classification / Summary:    Abnormal EEG awake / drowsy / sleep  1. LPDs in left parietal region, mostly 1-2hz, at times occurring as BIRDs for 3-4secs at frequencies up to 4-5hz. Some twitching with LPDs electrographically may suggest intermittent myoclonus, difficult to appreciate on video  2. Focal slowing in left hemisphere  3. Background slowing, moderately severe, generalized     Clinical Impression:  Findings indicate:    1. Evidence for left parietal lesion with high risk for seizures. Subtle myoclonic seizure activity suspected intermittently with some of the LPDs.   2. Severe diffuse/multifocal cerebral dysfunction, not specific as to etiology.  EEG unchanged     Jerome Arias MD  Epilepsy Fellow , [facility]  Department of Neurology, [facility] of [facility]    [facility] EEG Reading Room Ph#: [phone]  Epilepsy Answering Service after 5PM and before 8:30AM: Ph#: [phone]

## 2022-11-21 NOTE — PROGRESS NOTE ADULT - ASSESSMENT
72M w/ lacunar infarcts, CHF, CAD s/p PCI, CABG, s/p PPM, HTN, COPD DM, BPH, CKD. Presents w/ cardiac arrest. Had hypoglycemia at home prior to event. Total downtime ~5 minutes followed by ROSC. Admitted to ICU for post-arrest mgmt, course complicated by status epilepticus, ELMER on CKD and encephalopathy.     #Neuro - remains off sedation w/ reportedly some improvement in mental status today; EEG continues to show subtle myoclonic seizures and LPDs, valproic acids added to keppra and vimpat; continue VEEG monitoring, appreciate neuro input  #CV - HD stable at this time  #Pulm - remains intubated, difficulty w/ weaning as pt becomes tachypneic; PSV weaning as tolerated  #ID- spiking fever intermittently, all cx NGTD, no leukocytosis and procal remains low; suspect central fever but low threshold to start abx; of note sputum cx growing few aspergillus, will consider workup for ?chronic aspergilloses  #Renal/metabolic - ELMER on CKD, appears stable; continue w/ free water for hypernatremia; monitor I/Os, electrolytes  #GI- tolerating TF; start bowel regimen as pt has not had BM  #Heme - check LE dopplers in setting of continued fevers  #Endo - remains hyperglycemic, add admelog in addition to lantus bid, continue w/ ISS  #PPx - HSQ for DVT ppx  #Dispo- remains intubated w/ continued seizures in ICU; prognosis very guarded; full code    Plan of care discussed w/ son at bedside; discussed possibility of tracheostomy if unable to liberate from vent; all questions answered

## 2022-11-21 NOTE — PROGRESS NOTE ADULT - SUBJECTIVE AND OBJECTIVE BOX
HPI:    Clinical improvement in that patient intermittent follows some simple commands    EEG shows abundant spikes in left centroparietal region, at times associated with body myoclonus on video    Depakote added today, on 500mg q12h  Also on Vimpat 150mg IV q8 and Keppra 1000mg IV q8      Vital Signs Last 24 Hrs  T(C): 38 (21 Nov 2022 16:00), Max: 38.4 (21 Nov 2022 02:00)  T(F): 100.4 (21 Nov 2022 16:00), Max: 101.2 (21 Nov 2022 02:00)  HR: 90 (21 Nov 2022 16:00) (84 - 99)  BP: 107/59 (21 Nov 2022 16:00) (107/59 - 168/145)  BP(mean): 69 (21 Nov 2022 16:00) (69 - 150)  RR: 16 (21 Nov 2022 16:00) (14 - 34)  SpO2: 98% (21 Nov 2022 16:00) (95% - 100%)    Parameters below as of 21 Nov 2022 07:00  Patient On (Oxygen Delivery Method): ventilator    O2 Concentration (%): 40    MEDICATIONS  (STANDING):  aspirin  chewable 81 milliGRAM(s) Oral daily  bacitracin   Ointment 1 Application(s) Topical once  chlorhexidine 0.12% Liquid 15 milliLiter(s) Oral Mucosa every 12 hours  chlorhexidine 2% Cloths 1 Application(s) Topical <User Schedule>  heparin   Injectable 5000 Unit(s) SubCutaneous every 8 hours  insulin glargine Injectable (LANTUS) 20 Unit(s) SubCutaneous every morning  insulin glargine Injectable (LANTUS) 20 Unit(s) SubCutaneous at bedtime  insulin lispro (ADMELOG) corrective regimen sliding scale   SubCutaneous every 6 hours  insulin lispro Injectable (ADMELOG) 8 Unit(s) SubCutaneous every 6 hours  lacosamide IVPB 150 milliGRAM(s) IV Intermittent <User Schedule>  levETIRAcetam  IVPB 1000 milliGRAM(s) IV Intermittent <User Schedule>  polyethylene glycol 3350 17 Gram(s) Oral daily  senna 2 Tablet(s) Oral at bedtime  valproic  acid Syrup 500 milliGRAM(s) Enteral Tube every 12 hours    MEDICATIONS  (PRN):  midazolam Injectable 2 milliGRAM(s) IV Push every 1 hour PRN agitation/seizure      ROS: pertinent positives in HPI, all other ROS were reviewed and are negative     Neurological exam:  HF: Opens eyes, appears to blink to threat on the right side, squeezes examiners hand with right hand on command, moves feet on command coughs against the ventilator, breathes over the ventilator  CN: Pupils miotic, sluggishly reactive to light, can doll the eye to the left but not the right, +corneal reflex b/l  Motor: No spontaneous movement or withdrawal from painful stimuli  Sens: movement of left toes with stimulation of sole of foot  Reflexes: depressed throughout      LABS:                         13.8   9.49  )-----------( 231      ( 21 Nov 2022 02:45 )             43.7     11-21    158<H>  |  122<H>  |  71<H>  ----------------------------<  300<H>  4.4   |  29  |  1.43<H>    Ca    9.0      21 Nov 2022 02:45  Phos  2.7     11-21  Mg     3.2     11-21    TPro  6.6  /  Alb  2.5<L>  /  TBili  0.6  /  DBili  x   /  AST  58<H>  /  ALT  107<H>  /  AlkPhos  82  11-21    LIVER FUNCTIONS - ( 21 Nov 2022 02:45 )  Alb: 2.5 g/dL / Pro: 6.6 gm/dL / ALK PHOS: 82 U/L / ALT: 107 U/L / AST: 58 U/L / GGT: x           A/P:   73 yo man with cardiac arrest preceded by severe hypoglycemia.  Exam suggestive of anoxic brain injury.    Brainstem reflexes intact, and improvement in clinical exam today.    Plan:  1. Monitor off sedation if possible  2. Recommend reducing dose of Vimpat to 100mg IV q8h (higher dose not likely more effective, and may contribute to sedation)  3. Agree with Keppra 1000mg IV q8h  4. Agree with Depakote 500mg IV q12.  Check valproic acid level in AM  5. Supportive care    Mark Loo MD  Neurology Attending Physician

## 2022-11-22 LAB
ALBUMIN SERPL ELPH-MCNC: 2.1 G/DL — LOW (ref 3.3–5)
ALP SERPL-CCNC: 77 U/L — SIGNIFICANT CHANGE UP (ref 40–120)
ALT FLD-CCNC: 71 U/L — SIGNIFICANT CHANGE UP (ref 12–78)
ANION GAP SERPL CALC-SCNC: 4 MMOL/L — LOW (ref 5–17)
AST SERPL-CCNC: 34 U/L — SIGNIFICANT CHANGE UP (ref 15–37)
BASOPHILS # BLD AUTO: 0.07 K/UL — SIGNIFICANT CHANGE UP (ref 0–0.2)
BASOPHILS NFR BLD AUTO: 0.7 % — SIGNIFICANT CHANGE UP (ref 0–2)
BILIRUB SERPL-MCNC: 0.5 MG/DL — SIGNIFICANT CHANGE UP (ref 0.2–1.2)
BUN SERPL-MCNC: 68 MG/DL — HIGH (ref 7–23)
CALCIUM SERPL-MCNC: 8.5 MG/DL — SIGNIFICANT CHANGE UP (ref 8.5–10.1)
CHLORIDE SERPL-SCNC: 122 MMOL/L — HIGH (ref 96–108)
CO2 SERPL-SCNC: 29 MMOL/L — SIGNIFICANT CHANGE UP (ref 22–31)
CREAT SERPL-MCNC: 1.28 MG/DL — SIGNIFICANT CHANGE UP (ref 0.5–1.3)
EGFR: 59 ML/MIN/1.73M2 — LOW
EOSINOPHIL # BLD AUTO: 0.47 K/UL — SIGNIFICANT CHANGE UP (ref 0–0.5)
EOSINOPHIL NFR BLD AUTO: 4.5 % — SIGNIFICANT CHANGE UP (ref 0–6)
GLUCOSE BLDC GLUCOMTR-MCNC: 118 MG/DL — HIGH (ref 70–99)
GLUCOSE BLDC GLUCOMTR-MCNC: 161 MG/DL — HIGH (ref 70–99)
GLUCOSE BLDC GLUCOMTR-MCNC: 172 MG/DL — HIGH (ref 70–99)
GLUCOSE BLDC GLUCOMTR-MCNC: 182 MG/DL — HIGH (ref 70–99)
GLUCOSE BLDC GLUCOMTR-MCNC: 219 MG/DL — HIGH (ref 70–99)
GLUCOSE SERPL-MCNC: 205 MG/DL — HIGH (ref 70–99)
HCT VFR BLD CALC: 43.8 % — SIGNIFICANT CHANGE UP (ref 39–50)
HGB BLD-MCNC: 13.6 G/DL — SIGNIFICANT CHANGE UP (ref 13–17)
IMM GRANULOCYTES NFR BLD AUTO: 1 % — HIGH (ref 0–0.9)
LYMPHOCYTES # BLD AUTO: 1.57 K/UL — SIGNIFICANT CHANGE UP (ref 1–3.3)
LYMPHOCYTES # BLD AUTO: 15.2 % — SIGNIFICANT CHANGE UP (ref 13–44)
MAGNESIUM SERPL-MCNC: 3.1 MG/DL — HIGH (ref 1.6–2.6)
MCHC RBC-ENTMCNC: 30.6 PG — SIGNIFICANT CHANGE UP (ref 27–34)
MCHC RBC-ENTMCNC: 31.1 G/DL — LOW (ref 32–36)
MCV RBC AUTO: 98.4 FL — SIGNIFICANT CHANGE UP (ref 80–100)
MONOCYTES # BLD AUTO: 0.82 K/UL — SIGNIFICANT CHANGE UP (ref 0–0.9)
MONOCYTES NFR BLD AUTO: 7.9 % — SIGNIFICANT CHANGE UP (ref 2–14)
NEUTROPHILS # BLD AUTO: 7.33 K/UL — SIGNIFICANT CHANGE UP (ref 1.8–7.4)
NEUTROPHILS NFR BLD AUTO: 70.7 % — SIGNIFICANT CHANGE UP (ref 43–77)
NRBC # BLD: 0 /100 WBCS — SIGNIFICANT CHANGE UP (ref 0–0)
PHOSPHATE SERPL-MCNC: 2.5 MG/DL — SIGNIFICANT CHANGE UP (ref 2.5–4.5)
PLATELET # BLD AUTO: 226 K/UL — SIGNIFICANT CHANGE UP (ref 150–400)
POTASSIUM SERPL-MCNC: 4.5 MMOL/L — SIGNIFICANT CHANGE UP (ref 3.5–5.3)
POTASSIUM SERPL-SCNC: 4.5 MMOL/L — SIGNIFICANT CHANGE UP (ref 3.5–5.3)
PROT SERPL-MCNC: 6.1 GM/DL — SIGNIFICANT CHANGE UP (ref 6–8.3)
RBC # BLD: 4.45 M/UL — SIGNIFICANT CHANGE UP (ref 4.2–5.8)
RBC # FLD: 14.5 % — SIGNIFICANT CHANGE UP (ref 10.3–14.5)
SODIUM SERPL-SCNC: 155 MMOL/L — HIGH (ref 135–145)
VALPROATE SERPL-MCNC: 25 UG/ML — LOW (ref 50–100)
WBC # BLD: 10.36 K/UL — SIGNIFICANT CHANGE UP (ref 3.8–10.5)
WBC # FLD AUTO: 10.36 K/UL — SIGNIFICANT CHANGE UP (ref 3.8–10.5)

## 2022-11-22 PROCEDURE — 99233 SBSQ HOSP IP/OBS HIGH 50: CPT

## 2022-11-22 PROCEDURE — 95720 EEG PHY/QHP EA INCR W/VEEG: CPT

## 2022-11-22 PROCEDURE — 99291 CRITICAL CARE FIRST HOUR: CPT

## 2022-11-22 PROCEDURE — 93970 EXTREMITY STUDY: CPT | Mod: 26

## 2022-11-22 RX ORDER — ACETAMINOPHEN 500 MG
650 TABLET ORAL EVERY 6 HOURS
Refills: 0 | Status: DISCONTINUED | OUTPATIENT
Start: 2022-11-22 | End: 2022-11-29

## 2022-11-22 RX ADMIN — Medication 650 MILLIGRAM(S): at 19:21

## 2022-11-22 RX ADMIN — HEPARIN SODIUM 5000 UNIT(S): 5000 INJECTION INTRAVENOUS; SUBCUTANEOUS at 05:51

## 2022-11-22 RX ADMIN — Medication 4: at 00:06

## 2022-11-22 RX ADMIN — Medication 8 UNIT(S): at 11:42

## 2022-11-22 RX ADMIN — POLYETHYLENE GLYCOL 3350 17 GRAM(S): 17 POWDER, FOR SOLUTION ORAL at 11:41

## 2022-11-22 RX ADMIN — Medication 8 UNIT(S): at 00:05

## 2022-11-22 RX ADMIN — Medication 8 UNIT(S): at 05:52

## 2022-11-22 RX ADMIN — LACOSAMIDE 120 MILLIGRAM(S): 50 TABLET ORAL at 22:19

## 2022-11-22 RX ADMIN — HEPARIN SODIUM 5000 UNIT(S): 5000 INJECTION INTRAVENOUS; SUBCUTANEOUS at 22:01

## 2022-11-22 RX ADMIN — Medication 2: at 05:52

## 2022-11-22 RX ADMIN — LACOSAMIDE 120 MILLIGRAM(S): 50 TABLET ORAL at 14:31

## 2022-11-22 RX ADMIN — Medication 8 UNIT(S): at 17:46

## 2022-11-22 RX ADMIN — Medication 2: at 11:42

## 2022-11-22 RX ADMIN — Medication 2: at 17:47

## 2022-11-22 RX ADMIN — CHLORHEXIDINE GLUCONATE 15 MILLILITER(S): 213 SOLUTION TOPICAL at 05:51

## 2022-11-22 RX ADMIN — Medication 81 MILLIGRAM(S): at 11:41

## 2022-11-22 RX ADMIN — HEPARIN SODIUM 5000 UNIT(S): 5000 INJECTION INTRAVENOUS; SUBCUTANEOUS at 14:31

## 2022-11-22 RX ADMIN — LEVETIRACETAM 400 MILLIGRAM(S): 250 TABLET, FILM COATED ORAL at 14:31

## 2022-11-22 RX ADMIN — CHLORHEXIDINE GLUCONATE 1 APPLICATION(S): 213 SOLUTION TOPICAL at 05:51

## 2022-11-22 RX ADMIN — Medication 500 MILLIGRAM(S): at 05:55

## 2022-11-22 RX ADMIN — LEVETIRACETAM 400 MILLIGRAM(S): 250 TABLET, FILM COATED ORAL at 05:51

## 2022-11-22 RX ADMIN — CHLORHEXIDINE GLUCONATE 15 MILLILITER(S): 213 SOLUTION TOPICAL at 17:45

## 2022-11-22 RX ADMIN — SENNA PLUS 2 TABLET(S): 8.6 TABLET ORAL at 22:01

## 2022-11-22 RX ADMIN — LEVETIRACETAM 400 MILLIGRAM(S): 250 TABLET, FILM COATED ORAL at 22:01

## 2022-11-22 RX ADMIN — INSULIN GLARGINE 20 UNIT(S): 100 INJECTION, SOLUTION SUBCUTANEOUS at 08:07

## 2022-11-22 RX ADMIN — LACOSAMIDE 120 MILLIGRAM(S): 50 TABLET ORAL at 05:51

## 2022-11-22 RX ADMIN — INSULIN GLARGINE 20 UNIT(S): 100 INJECTION, SOLUTION SUBCUTANEOUS at 22:34

## 2022-11-22 RX ADMIN — Medication 500 MILLIGRAM(S): at 17:46

## 2022-11-22 RX ADMIN — Medication 650 MILLIGRAM(S): at 19:51

## 2022-11-22 NOTE — EEG REPORT - NS EEG TEXT BOX
CONTINUOUS VIDEO EEG RECORDING     Start date/time: 11/21/22 08:00  End date/time: 11/22/22 08:00  Duration: 24 hrs  Study Interpretation:    FINDINGS:      Background:  Symmetry: Asymmetric   Continuous: continuous  PDR: absent   Reactivity: absent   Voltage: normal, mostly 20-150uV  Anterior Posterior Gradient: absent   Breach: absent    Background Slowing:  Generalized slowing: diffuse theta and delta activity present     Focal slowing:    Continuous left hemispheric delta and theta    State Changes:   Absent    Rhythmic and Periodic Patterns (RPPs):  Abundant lateralized periodic discharges predominantly over the left parietal region max P3 field at times to P4/Cz/Pz, mostly 1-2hz, at times occurring as BIRDs for 3-5secs at frequencies up to 4-5hz with no clinical correlate or evolution.      Electrographic and Electroclinical seizures:  Discharges at times more broadly distributed, some twitching with LPDs electrographically may suggest intermittent myoclonus, difficult to appreciate on video    Other Clinical Events:  None    Activation Procedures:   -Hyperventilation was not performed.    -Photic stimulation was performed and did not elicit any abnormalities.      Artifacts:  Intermittent myogenic and movement artifacts were noted.    ECG:  ECG lead w significant artifact     EEG Classification / Summary:    Abnormal EEG awake / drowsy / sleep  1. LPDs in left parietal region, mostly 1-2hz, at times occurring as BIRDs for 3-5secs at frequencies up to 4-5hz. Some twitching with LPDs electrographically may suggest intermittent myoclonus, difficult to appreciate on video, improved intermittently after 1630PM, with some periods of less frequent discharges/LPDs  2. Focal slowing in left hemisphere  3. Background slowing, moderately severe, generalized     Clinical Impression:  Findings indicate:    1. Evidence for left parietal lesion with high risk for seizures. Subtle myoclonic seizure activity suspected intermittently with some of the LPDs, improved intermittently after 1630PM, with some periods of less frequent discharges/LPDs  2. Severe diffuse/multifocal cerebral dysfunction, not specific as to etiology.      Jerome Arias MD  Epilepsy Fellow , [facility]  Department of Neurology, [facility] of [facility]    [facility] EEG Reading Room Ph#: [phone]  Epilepsy Answering Service after 5PM and before 8:30AM: Ph#: [phone]     CONTINUOUS VIDEO EEG RECORDING     Start date/time: 11/21/22 08:00  End date/time: 11/22/22 08:00  Duration: 24 hrs  Study Interpretation:    FINDINGS:      Background:  Symmetry: Asymmetric   Continuous: continuous  PDR: absent   Reactivity: absent   Voltage: normal, mostly 20-150uV  Anterior Posterior Gradient: absent   Breach: absent    Background Slowing:  Generalized slowing: diffuse theta and delta activity present     Focal slowing:    Continuous left hemispheric delta and theta    State Changes:   Absent    Rhythmic and Periodic Patterns (RPPs):  Abundant lateralized periodic discharges predominantly over the left parietal region max P3 field at times to P4/Cz/Pz, mostly 1-2hz, at times occurring as BIRDs for 3-5secs at frequencies up to 4-5hz with no clinical correlate or evolution.      Electrographic and Electroclinical seizures:  Discharges at times more broadly distributed, some twitching with LPDs electrographically may suggest intermittent myoclonus, difficult to appreciate on video    Other Clinical Events:  None    Activation Procedures:   -Hyperventilation was not performed.    -Photic stimulation was performed and did not elicit any abnormalities.      Artifacts:  Intermittent myogenic and movement artifacts were noted.    ECG:  ECG lead w significant artifact     EEG Classification / Summary:    Abnormal EEG awake / drowsy / sleep  1. LPDs in left parietal region, mostly 1-2hz, at times occurring as BIRDs for 3-5secs at frequencies up to 4-5hz. Some twitching with LPDs electrographically may suggest intermittent myoclonus, difficult to appreciate on video, improved intermittently after 1630PM, with some periods of less frequent discharges/LPDs  2. Focal slowing in left hemisphere  3. Background slowing, moderately severe, generalized     Clinical Impression:  Findings indicate:  1. Evidence for left parietal lesion with high risk for seizures. Subtle myoclonic seizure activity suspected intermittently with some of the LPDs, improved intermittently after 1630PM, with some periods of less frequent discharges/LPDs  2. Severe diffuse/multifocal cerebral dysfunction, not specific as to etiology.      Jerome Arias MD  Epilepsy Fellow , [facility]  Department of Neurology, [facility] of Medicine    [facility] EEG Reading Room Ph#: [phone]  Epilepsy Answering Service after 5PM and before 8:30AM: Ph#: [phone]

## 2022-11-22 NOTE — PROGRESS NOTE ADULT - SUBJECTIVE AND OBJECTIVE BOX
HPI:  Patient remains in stupor, but can follow some simple commands    EEG: frequent spikes in left centroparietal region, no electrographic seizures but intermittent myoclonus noted    Vital Signs Last 24 Hrs  T(C): 38.2 (22 Nov 2022 15:41), Max: 38.4 (22 Nov 2022 04:00)  T(F): 100.7 (22 Nov 2022 15:41), Max: 101.1 (22 Nov 2022 04:00)  HR: 91 (22 Nov 2022 16:36) (82 - 99)  BP: 115/70 (22 Nov 2022 16:00) (81/62 - 128/74)  BP(mean): 80 (22 Nov 2022 16:00) (63 - 87)  RR: 18 (22 Nov 2022 16:00) (14 - 36)  SpO2: 97% (22 Nov 2022 16:36) (93% - 99%)    Parameters below as of 22 Nov 2022 07:00  Patient On (Oxygen Delivery Method): ventilator    O2 Concentration (%): 40    MEDICATIONS  (STANDING):  aspirin  chewable 81 milliGRAM(s) Oral daily  chlorhexidine 0.12% Liquid 15 milliLiter(s) Oral Mucosa every 12 hours  chlorhexidine 2% Cloths 1 Application(s) Topical <User Schedule>  heparin   Injectable 5000 Unit(s) SubCutaneous every 8 hours  insulin glargine Injectable (LANTUS) 20 Unit(s) SubCutaneous every morning  insulin glargine Injectable (LANTUS) 20 Unit(s) SubCutaneous at bedtime  insulin lispro (ADMELOG) corrective regimen sliding scale   SubCutaneous every 6 hours  insulin lispro Injectable (ADMELOG) 8 Unit(s) SubCutaneous every 6 hours  lacosamide IVPB 100 milliGRAM(s) IV Intermittent <User Schedule>  levETIRAcetam  IVPB 1000 milliGRAM(s) IV Intermittent <User Schedule>  polyethylene glycol 3350 17 Gram(s) Oral daily  senna 2 Tablet(s) Oral at bedtime  valproic  acid Syrup 500 milliGRAM(s) Enteral Tube every 12 hours    MEDICATIONS  (PRN):  midazolam Injectable 2 milliGRAM(s) IV Push every 1 hour PRN agitation/seizure      ROS: pertinent positives in HPI, all other ROS were reviewed and are negative       Neurological exam:  HF: Stuporous, Opens eyes, appears to blink to threat on the right side, squeezes examiners hand with right and left hand, coughs against the ventilator, breathes over the ventilator  CN: Pupils miotic, sluggishly reactive to light, gaze midline  Motor: No spontaneous movement or withdrawal from painful stimuli  Sens: movement of left toes with stimulation of sole of foot  Reflexes: depressed throughout          LABS:                         13.6   10.36 )-----------( 226      ( 22 Nov 2022 04:00 )             43.8     11-22    155<H>  |  122<H>  |  68<H>  ----------------------------<  205<H>  4.5   |  29  |  1.28    Ca    8.5      22 Nov 2022 04:00  Phos  2.5     11-22  Mg     3.1     11-22    TPro  6.1  /  Alb  2.1<L>  /  TBili  0.5  /  DBili  x   /  AST  34  /  ALT  71  /  AlkPhos  77  11-22    LIVER FUNCTIONS - ( 22 Nov 2022 04:00 )  Alb: 2.1 g/dL / Pro: 6.1 gm/dL / ALK PHOS: 77 U/L / ALT: 71 U/L / AST: 34 U/L / GGT: x             A/P:   71 yo man with cardiac arrest preceded by severe hypoglycemia.  Exam suggestive of anoxic brain injury.    Brainstem reflexes intact, and improvement in clinical exam, but remains stuporous with intermittent myoclonus.    Plan:  1. Monitor off sedation if possible  2. Vimpat 100mg IV q8h (higher dose not likely more effective, and may contribute to sedation)  3. Agree with Keppra 1000mg IV q8h  4. Agree with Depakote 500mg IV q12.  Check valproic acid level in AM  5. Correct metabolic derangements (ie hypernatremia, uremia)  6. Supportive care    Mark Loo MD  Neurology Attending Physician

## 2022-11-22 NOTE — PROGRESS NOTE ADULT - ASSESSMENT
72M w/ lacunar infarcts, CHF, CAD s/p PCI, CABG, s/p PPM, HTN, COPD DM, BPH, CKD. Presents w/ cardiac arrest. Had hypoglycemia at home prior to event. Total downtime ~5 minutes followed by ROSC. Admitted to ICU for post-arrest mgmt, course complicated by status epilepticus, ELMER on CKD and encephalopathy.     #Neuro - remains off sedation w/ reportedly some improvement in mental status today; EEG continues to show myoclonus and LPDs EEG continues to show subtle myoclonic seizures and LPDs, valproic acids added to keppra and vimpat; continue VEEG monitoring, appreciate neuro input  #CV - HD stable at this time  #Pulm - remains intubated, difficulty w/ weaning as pt becomes tachypneic; PSV weaning as tolerated  #ID- spiking fever intermittently, all cx NGTD, no leukocytosis and procal remains low; suspect central fever but low threshold to start abx; of note sputum cx growing few aspergillus, will consider workup for ?chronic aspergilloses  #Renal/metabolic - ELMER on CKD, appears stable; continue w/ free water for hypernatremia; monitor I/Os, electrolytes  #GI- tolerating TF; start bowel regimen as pt has not had BM  #Heme - check LE dopplers in setting of continued fevers  #Endo - remains hyperglycemic, add admelog in addition to lantus bid, continue w/ ISS  #PPx - HSQ for DVT ppx  #Dispo- remains intubated w/ continued seizures in ICU; prognosis very guarded; full code    Plan of care discussed w/ son at bedside; discussed possibility of tracheostomy if unable to liberate from vent; all questions answered   72M w/ lacunar infarcts, CHF, CAD s/p PCI, CABG, s/p PPM, HTN, COPD DM, BPH, CKD. Presents w/ cardiac arrest. Had hypoglycemia at home prior to event. Total downtime ~5 minutes followed by ROSC. Admitted to ICU for post-arrest mgmt, course complicated by status epilepticus, ELMER on CKD and encephalopathy and continued fevers.    #Neuro - remains off sedation w/ reportedly some improvement in mental status today; EEG continues to show myoclonus and LPDs but can d/c CEEG; continue w/ current AEDs, f/u valproate level; appreciate neuro input  #CV - HD stable at this time  #Pulm - remains intubated, difficulty w/ weaning as pt becomes tachypneic; PSV weaning as tolerated  #ID- spiking fever intermittently, all cx NGTD, no leukocytosis and procal remains low; suspect central fever but low threshold to start abx; of note sputum cx growing few aspergillus, workup sent for ?chronic aspergilloses  #Renal/metabolic - ELMER on CKD, improving; continue w/ free water for hypernatremia; monitor I/Os, electrolytes  #GI- tolerating TF; continue w/ bowel regimen  #Heme - LE dopplers negative for DVT   #Endo - hyperglycemia improved, continue w/ ademlog and lantus bid and ISS  #PPx - HSQ for DVT ppx  #Dispo- remains intubated w/ continued seizures in ICU; prognosis very guarded; full code

## 2022-11-22 NOTE — PROGRESS NOTE ADULT - SUBJECTIVE AND OBJECTIVE BOX
CHIEF COMPLAINT:    Interval Events:    REVIEW OF SYSTEMS:  Constitutional: [ ] fevers [ ] chills [ ] weight loss [ ] weight gain  HEENT: [ ] dry eyes [ ] eye irritation [ ] postnasal drip [ ] nasal congestion  CV: [ ] chest pain [ ] orthopnea [ ] palpitations [ ] murmur  Resp: [ ] cough [ ] shortness of breath [ ] dyspnea [ ] wheezing [ ] sputum [ ] hemoptysis  GI: [ ] nausea [ ] vomiting [ ] diarrhea [ ] constipation [ ] abd pain [ ] dysphagia   : [ ] dysuria [ ] nocturia [ ] hematuria [ ] increased urinary frequency  Musculoskeletal: [ ] back pain [ ] myalgias [ ] arthralgias [ ] fracture  Skin: [ ] rash [ ] itch  Neurological: [ ] headache [ ] dizziness [ ] syncope [ ] weakness [ ] numbness  Hematologic/Lymphatic: [ ] anemia [ ] bleeding problem  Allergic/Immunologic: [ ] itchy eyes [ ] nasal discharge [ ] hives [ ] angioedema  [ ] All other systems negative  [ ] Unable to assess ROS because ________    OBJECTIVE:  ICU Vital Signs Last 24 Hrs  T(C): 37.8 (22 Nov 2022 07:17), Max: 38.4 (22 Nov 2022 04:00)  T(F): 100.1 (22 Nov 2022 07:17), Max: 101.1 (22 Nov 2022 04:00)  HR: 87 (22 Nov 2022 07:00) (84 - 99)  BP: 96/54 (22 Nov 2022 07:00) (81/62 - 168/145)  BP(mean): 63 (22 Nov 2022 07:00) (63 - 150)  ABP: --  ABP(mean): --  RR: 15 (22 Nov 2022 07:00) (15 - 34)  SpO2: 98% (22 Nov 2022 07:00) (93% - 100%)    O2 Parameters below as of 22 Nov 2022 07:00  Patient On (Oxygen Delivery Method): ventilator    O2 Concentration (%): 40      Mode: AC/ CMV (Assist Control/ Continuous Mandatory Ventilation), RR (machine): 14, TV (machine): 450, FiO2: 40, PEEP: 5, ITime: 1, MAP: 20, PIP: 34    11-21 @ 07:01  -  11-22 @ 07:00  --------------------------------------------------------  IN: 3570 mL / OUT: 1596 mL / NET: 1974 mL      CAPILLARY BLOOD GLUCOSE      POCT Blood Glucose.: 182 mg/dL (22 Nov 2022 05:49)      PHYSICAL EXAM:  General:   HEENT:   Neck:   Respiratory:   Cardiovascular:   Abdomen:   Extremities:   Skin:   Neurological:  Psychiatry:    LINES:    HOSPITAL MEDICATIONS:  MEDICATIONS  (STANDING):  aspirin  chewable 81 milliGRAM(s) Oral daily  chlorhexidine 0.12% Liquid 15 milliLiter(s) Oral Mucosa every 12 hours  chlorhexidine 2% Cloths 1 Application(s) Topical <User Schedule>  heparin   Injectable 5000 Unit(s) SubCutaneous every 8 hours  insulin glargine Injectable (LANTUS) 20 Unit(s) SubCutaneous every morning  insulin glargine Injectable (LANTUS) 20 Unit(s) SubCutaneous at bedtime  insulin lispro (ADMELOG) corrective regimen sliding scale   SubCutaneous every 6 hours  insulin lispro Injectable (ADMELOG) 8 Unit(s) SubCutaneous every 6 hours  lacosamide IVPB 100 milliGRAM(s) IV Intermittent <User Schedule>  levETIRAcetam  IVPB 1000 milliGRAM(s) IV Intermittent <User Schedule>  polyethylene glycol 3350 17 Gram(s) Oral daily  senna 2 Tablet(s) Oral at bedtime  valproic  acid Syrup 500 milliGRAM(s) Enteral Tube every 12 hours    MEDICATIONS  (PRN):  midazolam Injectable 2 milliGRAM(s) IV Push every 1 hour PRN agitation/seizure      LABS:                        13.6   10.36 )-----------( 226      ( 22 Nov 2022 04:00 )             43.8     Hgb Trend: 13.6<--, 13.8<--, 14.6<--, 13.4<--, 13.7<--  11-22    155<H>  |  122<H>  |  68<H>  ----------------------------<  205<H>  4.5   |  29  |  1.28    Ca    8.5      22 Nov 2022 04:00  Phos  2.5     11-22  Mg     3.1     11-22    TPro  6.1  /  Alb  2.1<L>  /  TBili  0.5  /  DBili  x   /  AST  34  /  ALT  71  /  AlkPhos  77  11-22              MICROBIOLOGY:     RADIOLOGY:  [ ] Reviewed and interpreted by me CHIEF COMPLAINT:    Interval Events:  Tmax 101.1  PSV 30 minutes but stopped due to tachypnea    REVIEW OF SYSTEMS:  [x ] Unable to assess ROS because ________    OBJECTIVE:  ICU Vital Signs Last 24 Hrs  T(C): 37.8 (22 Nov 2022 07:17), Max: 38.4 (22 Nov 2022 04:00)  T(F): 100.1 (22 Nov 2022 07:17), Max: 101.1 (22 Nov 2022 04:00)  HR: 87 (22 Nov 2022 07:00) (84 - 99)  BP: 96/54 (22 Nov 2022 07:00) (81/62 - 168/145)  BP(mean): 63 (22 Nov 2022 07:00) (63 - 150)  ABP: --  ABP(mean): --  RR: 15 (22 Nov 2022 07:00) (15 - 34)  SpO2: 98% (22 Nov 2022 07:00) (93% - 100%)    O2 Parameters below as of 22 Nov 2022 07:00  Patient On (Oxygen Delivery Method): ventilator    O2 Concentration (%): 40      Mode: AC/ CMV (Assist Control/ Continuous Mandatory Ventilation), RR (machine): 14, TV (machine): 450, FiO2: 40, PEEP: 5, ITime: 1, MAP: 20, PIP: 34    11-21 @ 07:01  -  11-22 @ 07:00  --------------------------------------------------------  IN: 3570 mL / OUT: 1596 mL / NET: 1974 mL      CAPILLARY BLOOD GLUCOSE      POCT Blood Glucose.: 182 mg/dL (22 Nov 2022 05:49)      PHYSICAL EXAM:  General:   HEENT:   Neck:   Respiratory:   Cardiovascular:   Abdomen:   Extremities:   Skin:   Neurological:  Psychiatry:    LINES:    HOSPITAL MEDICATIONS:  MEDICATIONS  (STANDING):  aspirin  chewable 81 milliGRAM(s) Oral daily  chlorhexidine 0.12% Liquid 15 milliLiter(s) Oral Mucosa every 12 hours  chlorhexidine 2% Cloths 1 Application(s) Topical <User Schedule>  heparin   Injectable 5000 Unit(s) SubCutaneous every 8 hours  insulin glargine Injectable (LANTUS) 20 Unit(s) SubCutaneous every morning  insulin glargine Injectable (LANTUS) 20 Unit(s) SubCutaneous at bedtime  insulin lispro (ADMELOG) corrective regimen sliding scale   SubCutaneous every 6 hours  insulin lispro Injectable (ADMELOG) 8 Unit(s) SubCutaneous every 6 hours  lacosamide IVPB 100 milliGRAM(s) IV Intermittent <User Schedule>  levETIRAcetam  IVPB 1000 milliGRAM(s) IV Intermittent <User Schedule>  polyethylene glycol 3350 17 Gram(s) Oral daily  senna 2 Tablet(s) Oral at bedtime  valproic  acid Syrup 500 milliGRAM(s) Enteral Tube every 12 hours    MEDICATIONS  (PRN):  midazolam Injectable 2 milliGRAM(s) IV Push every 1 hour PRN agitation/seizure      LABS:                        13.6   10.36 )-----------( 226      ( 22 Nov 2022 04:00 )             43.8     Hgb Trend: 13.6<--, 13.8<--, 14.6<--, 13.4<--, 13.7<--  11-22    155<H>  |  122<H>  |  68<H>  ----------------------------<  205<H>  4.5   |  29  |  1.28    Ca    8.5      22 Nov 2022 04:00  Phos  2.5     11-22  Mg     3.1     11-22    TPro  6.1  /  Alb  2.1<L>  /  TBili  0.5  /  DBili  x   /  AST  34  /  ALT  71  /  AlkPhos  77  11-22              MICROBIOLOGY:     RADIOLOGY:  [ ] Reviewed and interpreted by me CHIEF COMPLAINT:    Interval Events:  Tmax 101.1  PSV 30 minutes but stopped due to tachypnea  continues to have myoclonus    REVIEW OF SYSTEMS:  [x ] Unable to assess ROS because intubated/encephalopathy    OBJECTIVE:  ICU Vital Signs Last 24 Hrs  T(C): 37.8 (22 Nov 2022 07:17), Max: 38.4 (22 Nov 2022 04:00)  T(F): 100.1 (22 Nov 2022 07:17), Max: 101.1 (22 Nov 2022 04:00)  HR: 87 (22 Nov 2022 07:00) (84 - 99)  BP: 96/54 (22 Nov 2022 07:00) (81/62 - 168/145)  BP(mean): 63 (22 Nov 2022 07:00) (63 - 150)  ABP: --  ABP(mean): --  RR: 15 (22 Nov 2022 07:00) (15 - 34)  SpO2: 98% (22 Nov 2022 07:00) (93% - 100%)    O2 Parameters below as of 22 Nov 2022 07:00  Patient On (Oxygen Delivery Method): ventilator    O2 Concentration (%): 40      Mode: AC/ CMV (Assist Control/ Continuous Mandatory Ventilation), RR (machine): 14, TV (machine): 450, FiO2: 40, PEEP: 5, ITime: 1, MAP: 20, PIP: 34    11-21 @ 07:01  -  11-22 @ 07:00  --------------------------------------------------------  IN: 3570 mL / OUT: 1596 mL / NET: 1974 mL      CAPILLARY BLOOD GLUCOSE      POCT Blood Glucose.: 182 mg/dL (22 Nov 2022 05:49)      PHYSICAL EXAM:  General: NAD, non toxic appearing  HEENT: ETT and NGT in place  Neck: supple  Respiratory: CTA b/l  Cardiovascular: s1s2 RRR  Abdomen: soft, non tender, non distended  Extremities: warm, no edema or clubbing  Skin: L ear injury and sacral wound  Neurological: opening eyes, slowly squeezing hands  Psychiatry: unable to assess    LINES:  [facility]    HOSPITAL MEDICATIONS:  MEDICATIONS  (STANDING):  aspirin  chewable 81 milliGRAM(s) Oral daily  chlorhexidine 0.12% Liquid 15 milliLiter(s) Oral Mucosa every 12 hours  chlorhexidine 2% Cloths 1 Application(s) Topical <User Schedule>  heparin   Injectable 5000 Unit(s) SubCutaneous every 8 hours  insulin glargine Injectable (LANTUS) 20 Unit(s) SubCutaneous every morning  insulin glargine Injectable (LANTUS) 20 Unit(s) SubCutaneous at bedtime  insulin lispro (ADMELOG) corrective regimen sliding scale   SubCutaneous every 6 hours  insulin lispro Injectable (ADMELOG) 8 Unit(s) SubCutaneous every 6 hours  lacosamide IVPB 100 milliGRAM(s) IV Intermittent <User Schedule>  levETIRAcetam  IVPB 1000 milliGRAM(s) IV Intermittent <User Schedule>  polyethylene glycol 3350 17 Gram(s) Oral daily  senna 2 Tablet(s) Oral at bedtime  valproic  acid Syrup 500 milliGRAM(s) Enteral Tube every 12 hours    MEDICATIONS  (PRN):  midazolam Injectable 2 milliGRAM(s) IV Push every 1 hour PRN agitation/seizure      LABS:                        13.6   10.36 )-----------( 226      ( 22 Nov 2022 04:00 )             43.8     Hgb Trend: 13.6<--, 13.8<--, 14.6<--, 13.4<--, 13.7<--  11-22    155<H>  |  122<H>  |  68<H>  ----------------------------<  205<H>  4.5   |  29  |  1.28    Ca    8.5      22 Nov 2022 04:00  Phos  2.5     11-22  Mg     3.1     11-22    TPro  6.1  /  Alb  2.1<L>  /  TBili  0.5  /  DBili  x   /  AST  34  /  ALT  71  /  AlkPhos  77  11-22              MICROBIOLOGY:     RADIOLOGY:  [ ] Reviewed and interpreted by me

## 2022-11-23 LAB
ALBUMIN SERPL ELPH-MCNC: 2 G/DL — LOW (ref 3.3–5)
ALP SERPL-CCNC: 77 U/L — SIGNIFICANT CHANGE UP (ref 40–120)
ALT FLD-CCNC: 54 U/L — SIGNIFICANT CHANGE UP (ref 12–78)
ANION GAP SERPL CALC-SCNC: 3 MMOL/L — LOW (ref 5–17)
AST SERPL-CCNC: 31 U/L — SIGNIFICANT CHANGE UP (ref 15–37)
BILIRUB SERPL-MCNC: 0.6 MG/DL — SIGNIFICANT CHANGE UP (ref 0.2–1.2)
BUN SERPL-MCNC: 58 MG/DL — HIGH (ref 7–23)
CALCIUM SERPL-MCNC: 8.4 MG/DL — LOW (ref 8.5–10.1)
CHLORIDE SERPL-SCNC: 118 MMOL/L — HIGH (ref 96–108)
CO2 SERPL-SCNC: 30 MMOL/L — SIGNIFICANT CHANGE UP (ref 22–31)
CREAT SERPL-MCNC: 1.12 MG/DL — SIGNIFICANT CHANGE UP (ref 0.5–1.3)
EGFR: 69 ML/MIN/1.73M2 — SIGNIFICANT CHANGE UP
GLUCOSE BLDC GLUCOMTR-MCNC: 122 MG/DL — HIGH (ref 70–99)
GLUCOSE BLDC GLUCOMTR-MCNC: 127 MG/DL — HIGH (ref 70–99)
GLUCOSE BLDC GLUCOMTR-MCNC: 155 MG/DL — HIGH (ref 70–99)
GLUCOSE BLDC GLUCOMTR-MCNC: 157 MG/DL — HIGH (ref 70–99)
GLUCOSE BLDC GLUCOMTR-MCNC: 168 MG/DL — HIGH (ref 70–99)
GLUCOSE BLDC GLUCOMTR-MCNC: 173 MG/DL — HIGH (ref 70–99)
GLUCOSE SERPL-MCNC: 136 MG/DL — HIGH (ref 70–99)
HCT VFR BLD CALC: 43.1 % — SIGNIFICANT CHANGE UP (ref 39–50)
HGB BLD-MCNC: 13.4 G/DL — SIGNIFICANT CHANGE UP (ref 13–17)
IGE SERPL-ACNC: 133 KU/L — HIGH
MAGNESIUM SERPL-MCNC: 3 MG/DL — HIGH (ref 1.6–2.6)
MCHC RBC-ENTMCNC: 30 PG — SIGNIFICANT CHANGE UP (ref 27–34)
MCHC RBC-ENTMCNC: 31.1 G/DL — LOW (ref 32–36)
MCV RBC AUTO: 96.4 FL — SIGNIFICANT CHANGE UP (ref 80–100)
NRBC # BLD: 0 /100 WBCS — SIGNIFICANT CHANGE UP (ref 0–0)
PHOSPHATE SERPL-MCNC: 2.9 MG/DL — SIGNIFICANT CHANGE UP (ref 2.5–4.5)
PLATELET # BLD AUTO: 225 K/UL — SIGNIFICANT CHANGE UP (ref 150–400)
POTASSIUM SERPL-MCNC: 4.5 MMOL/L — SIGNIFICANT CHANGE UP (ref 3.5–5.3)
POTASSIUM SERPL-SCNC: 4.5 MMOL/L — SIGNIFICANT CHANGE UP (ref 3.5–5.3)
PROT SERPL-MCNC: 6 GM/DL — SIGNIFICANT CHANGE UP (ref 6–8.3)
RBC # BLD: 4.47 M/UL — SIGNIFICANT CHANGE UP (ref 4.2–5.8)
RBC # FLD: 14.2 % — SIGNIFICANT CHANGE UP (ref 10.3–14.5)
SODIUM SERPL-SCNC: 151 MMOL/L — HIGH (ref 135–145)
WBC # BLD: 10.94 K/UL — HIGH (ref 3.8–10.5)
WBC # FLD AUTO: 10.94 K/UL — HIGH (ref 3.8–10.5)

## 2022-11-23 PROCEDURE — 99291 CRITICAL CARE FIRST HOUR: CPT

## 2022-11-23 RX ORDER — VALPROIC ACID (AS SODIUM SALT) 250 MG/5ML
500 SOLUTION, ORAL ORAL EVERY 8 HOURS
Refills: 0 | Status: DISCONTINUED | OUTPATIENT
Start: 2022-11-23 | End: 2023-01-02

## 2022-11-23 RX ADMIN — LACOSAMIDE 120 MILLIGRAM(S): 50 TABLET ORAL at 15:44

## 2022-11-23 RX ADMIN — Medication 8 UNIT(S): at 06:24

## 2022-11-23 RX ADMIN — Medication 650 MILLIGRAM(S): at 17:00

## 2022-11-23 RX ADMIN — Medication 2: at 18:09

## 2022-11-23 RX ADMIN — LACOSAMIDE 120 MILLIGRAM(S): 50 TABLET ORAL at 06:23

## 2022-11-23 RX ADMIN — LEVETIRACETAM 400 MILLIGRAM(S): 250 TABLET, FILM COATED ORAL at 14:37

## 2022-11-23 RX ADMIN — LACOSAMIDE 120 MILLIGRAM(S): 50 TABLET ORAL at 21:19

## 2022-11-23 RX ADMIN — Medication 2: at 23:31

## 2022-11-23 RX ADMIN — LEVETIRACETAM 400 MILLIGRAM(S): 250 TABLET, FILM COATED ORAL at 06:23

## 2022-11-23 RX ADMIN — INSULIN GLARGINE 20 UNIT(S): 100 INJECTION, SOLUTION SUBCUTANEOUS at 21:19

## 2022-11-23 RX ADMIN — HEPARIN SODIUM 5000 UNIT(S): 5000 INJECTION INTRAVENOUS; SUBCUTANEOUS at 06:00

## 2022-11-23 RX ADMIN — Medication 500 MILLIGRAM(S): at 06:24

## 2022-11-23 RX ADMIN — Medication 500 MILLIGRAM(S): at 14:37

## 2022-11-23 RX ADMIN — Medication 8 UNIT(S): at 12:22

## 2022-11-23 RX ADMIN — HEPARIN SODIUM 5000 UNIT(S): 5000 INJECTION INTRAVENOUS; SUBCUTANEOUS at 14:37

## 2022-11-23 RX ADMIN — CHLORHEXIDINE GLUCONATE 15 MILLILITER(S): 213 SOLUTION TOPICAL at 18:10

## 2022-11-23 RX ADMIN — HEPARIN SODIUM 5000 UNIT(S): 5000 INJECTION INTRAVENOUS; SUBCUTANEOUS at 21:24

## 2022-11-23 RX ADMIN — SENNA PLUS 2 TABLET(S): 8.6 TABLET ORAL at 21:24

## 2022-11-23 RX ADMIN — INSULIN GLARGINE 20 UNIT(S): 100 INJECTION, SOLUTION SUBCUTANEOUS at 08:31

## 2022-11-23 RX ADMIN — LEVETIRACETAM 400 MILLIGRAM(S): 250 TABLET, FILM COATED ORAL at 21:24

## 2022-11-23 RX ADMIN — Medication 2: at 12:27

## 2022-11-23 RX ADMIN — Medication 2: at 01:06

## 2022-11-23 RX ADMIN — CHLORHEXIDINE GLUCONATE 15 MILLILITER(S): 213 SOLUTION TOPICAL at 06:23

## 2022-11-23 RX ADMIN — POLYETHYLENE GLYCOL 3350 17 GRAM(S): 17 POWDER, FOR SOLUTION ORAL at 12:21

## 2022-11-23 RX ADMIN — Medication 500 MILLIGRAM(S): at 21:26

## 2022-11-23 RX ADMIN — Medication 8 UNIT(S): at 01:05

## 2022-11-23 RX ADMIN — Medication 650 MILLIGRAM(S): at 15:44

## 2022-11-23 RX ADMIN — Medication 8 UNIT(S): at 23:30

## 2022-11-23 RX ADMIN — Medication 81 MILLIGRAM(S): at 12:21

## 2022-11-23 RX ADMIN — Medication 8 UNIT(S): at 18:09

## 2022-11-23 RX ADMIN — CHLORHEXIDINE GLUCONATE 1 APPLICATION(S): 213 SOLUTION TOPICAL at 06:23

## 2022-11-23 NOTE — CHART NOTE - NSCHARTNOTEFT_GEN_A_CORE
Per chart pt with PMH CHF, lacunar infarct, CAD s/p PCI, s/p CABG x 3, PPM, HTN, COPD, T2DM, CKD, admitted s/p cardiac arrest, intubated 11/14. Pt remains intubated on vent support. Course complicated by seizure activity.     Factors impacting intake: [ ] none [ ] nausea  [ ] vomiting [ ] diarrhea [ ] constipation  [ ]chewing problems [ ] swallowing issues  [x] other: pt intubated    Diet Prescription: Diet, NPO with Tube Feed:   Tube Feeding Modality: Nasogastric  Glucerna 1.2 Pedrito  Continuous  Starting Tube Feed Rate {mL per Hour}: 20  Increase Tube Feed Rate by (mL): 10     Every 6 hours  Until Goal Tube Feed Rate (mL per Hour): 40  Tube Feed Duration (in Hours): 24  Tube Feed Start Time: 12:00 (11-19-22 @ 11:54)    Intake: Tube feed infusing @ goal rate at time of visit. RN reports pt tolerating formula well. Tube feed as ordered Glucerna 1.2 @ 40 ml/hr x 24 hours provides 960ml total volume, 1152 calories, 58 grams protein, 773 ml water meeting ~78% lower end protein needs as estimated below and ~75% lower end kcal needs as estimated on initial evaluation 11/16     Current Weight: (11/22) 78.9kg (11/13) 86.2kg indicates weight loss of 7.3kg  % Weight Change: 8% weight loss x 9 days    Edema: 2+ right and left arms, hands, and wrists as per flow sheets    Physical Appearance: Unable to conduct nutrition focused physical exam as pt intubated being tended to by RN at time of visit    Pertinent Medications: MEDICATIONS  (STANDING):  aspirin  chewable 81 milliGRAM(s) Oral daily  chlorhexidine 0.12% Liquid 15 milliLiter(s) Oral Mucosa every 12 hours  chlorhexidine 2% Cloths 1 Application(s) Topical <User Schedule>  heparin   Injectable 5000 Unit(s) SubCutaneous every 8 hours  insulin glargine Injectable (LANTUS) 20 Unit(s) SubCutaneous every morning  insulin glargine Injectable (LANTUS) 20 Unit(s) SubCutaneous at bedtime  insulin lispro (ADMELOG) corrective regimen sliding scale   SubCutaneous every 6 hours  insulin lispro Injectable (ADMELOG) 8 Unit(s) SubCutaneous every 6 hours  lacosamide IVPB 100 milliGRAM(s) IV Intermittent <User Schedule>  levETIRAcetam  IVPB 1000 milliGRAM(s) IV Intermittent <User Schedule>  polyethylene glycol 3350 17 Gram(s) Oral daily  senna 2 Tablet(s) Oral at bedtime  valproic  acid Syrup 500 milliGRAM(s) Enteral Tube every 8 hours    MEDICATIONS  (PRN):  acetaminophen     Tablet .. 650 milliGRAM(s) Oral every 6 hours PRN Temp greater or equal to 38C (100.4F), Mild Pain (1 - 3)    Pertinent Labs: 11-23 Na151 mmol/L<H> Glu 136 mg/dL<H> K+ 4.5 mmol/L Cr  1.12 mg/dL BUN 58 mg/dL<H> 11-23 Phos 2.9 mg/dL 11-23 Alb 2.0 g/dL<L>    11-14-22 @ 05:30  A1C 8.8    Skin: stage II sacral pressure injury and DTI of left ear as per flow sheets    Estimated Needs:   [x] no change since previous assessment 11/16 for kcal and fluid needs  [x] recalculated based on IBW 61.6kg for protein needs: 1.2-1.4g/kg protein= 74-86g protein/day    Previous Nutrition Diagnosis:   [x] 	Inadequate Energy Intake    Etiology	Acute illness; s/p cardiac arrest; OGT decompression  	  Signs/Symptoms	Patient NPO x 2 days  	  Goal/Expected Outcome	Patient to meet >50-75% of protein-energy needs via OGT. (met)      Nutrition Diagnosis is [x] ongoing  [ ] resolved [ ] not applicable     New Nutrition Diagnosis: [x] not applicable      Interventions:   Recommend  [ ] Change Diet To:  [ ] Nutrition Supplement  [x] Nutrition Support: Recommend increase tube feed to Glucerna 1.2 @ goal rate of 55 mL/hr x 24 hrs which provides 1320 ml total volume, 1584 kcal, 79 g protein, 1069 mL free water to better meet nutrient needs  [ ] Other:     Monitoring and Evaluation:   [ ] PO intake [ x ] Tolerance to diet prescription [ x ] weights [ x ] labs[ x ] follow up per protocol  [ ] other:

## 2022-11-23 NOTE — PROGRESS NOTE ADULT - ASSESSMENT
72M w/ lacunar infarcts, CHF, CAD s/p PCI, CABG, s/p PPM, HTN, COPD DM, BPH, CKD. Presents w/ cardiac arrest. Had hypoglycemia at home prior to event. Total downtime ~5 minutes followed by ROSC. Admitted to ICU for post-arrest mgmt, course complicated by status epilepticus, ELMER on CKD and encephalopathy and continued fevers.    #Neuro - remains off sedation w/ reportedly some improvement in mental status today; VEEG discontinued; increase valproate since level low, repeat in 2 days; continue w/ keppra and vimpat; appreciate neuro input  #CV - HD stable at this time  #Pulm - remains intubated, somewhat improved w/ weaning today, continue PSV weaning as tolerated  #ID- spiking fever intermittently, all cx NGTD, no leukocytosis and procal remains low, check procal tomorrow; suspect central fever but low threshold to start abx; of note sputum cx growing few aspergillus, workup sent for ?chronic aspergilloses  #Renal/metabolic - ELMER on CKD, improving; continue w/ free water for hypernatremia; monitor I/Os, electrolytes  #GI- tolerating TF; continue w/ bowel regimen  #Heme - LE dopplers negative for DVT   #Endo - hyperglycemia improved, continue w/ ademlog and lantus bid and ISS  #PPx - HSQ for DVT ppx  #Dispo- remains intubated w/ continued seizures in ICU; prognosis very guarded; full code    Plan of care discussed w/ son at bedside; we discussed need for trach and that we are seeing some improvement in neuro status but he needs more time and trach would be in his best interest, I explained that trach does not have to be permanent but we cannot predict what will happen to his dad; also explained that with trach all other treatments continue; family is in agreement, surgery consulted

## 2022-11-23 NOTE — PROGRESS NOTE ADULT - SUBJECTIVE AND OBJECTIVE BOX
CHIEF COMPLAINT:    Interval Events:    REVIEW OF SYSTEMS:  Constitutional: [ ] fevers [ ] chills [ ] weight loss [ ] weight gain  HEENT: [ ] dry eyes [ ] eye irritation [ ] postnasal drip [ ] nasal congestion  CV: [ ] chest pain [ ] orthopnea [ ] palpitations [ ] murmur  Resp: [ ] cough [ ] shortness of breath [ ] dyspnea [ ] wheezing [ ] sputum [ ] hemoptysis  GI: [ ] nausea [ ] vomiting [ ] diarrhea [ ] constipation [ ] abd pain [ ] dysphagia   : [ ] dysuria [ ] nocturia [ ] hematuria [ ] increased urinary frequency  Musculoskeletal: [ ] back pain [ ] myalgias [ ] arthralgias [ ] fracture  Skin: [ ] rash [ ] itch  Neurological: [ ] headache [ ] dizziness [ ] syncope [ ] weakness [ ] numbness  Hematologic/Lymphatic: [ ] anemia [ ] bleeding problem  Allergic/Immunologic: [ ] itchy eyes [ ] nasal discharge [ ] hives [ ] angioedema  [ ] All other systems negative  [ ] Unable to assess ROS because ________    OBJECTIVE:  ICU Vital Signs Last 24 Hrs  T(C): 37.6 (23 Nov 2022 06:00), Max: 38.2 (22 Nov 2022 15:04)  T(F): 99.6 (23 Nov 2022 06:00), Max: 100.8 (22 Nov 2022 20:05)  HR: 84 (23 Nov 2022 07:00) (82 - 96)  BP: 126/57 (23 Nov 2022 07:00) (96/76 - 135/74)  BP(mean): 73 (23 Nov 2022 07:00) (68 - 91)  ABP: --  ABP(mean): --  RR: 16 (23 Nov 2022 07:00) (14 - 36)  SpO2: 97% (23 Nov 2022 07:00) (96% - 100%)      Mode: AC/ CMV (Assist Control/ Continuous Mandatory Ventilation), RR (machine): 14, TV (machine): 450, FiO2: 30, PEEP: 5, ITime: 1, MAP: 9, PIP: 29    11-22 @ 07:01  -  11-23 @ 07:00  --------------------------------------------------------  IN: 2960 mL / OUT: 600 mL / NET: 2360 mL      CAPILLARY BLOOD GLUCOSE      POCT Blood Glucose.: 122 mg/dL (23 Nov 2022 05:47)      PHYSICAL EXAM:  General:   HEENT:   Neck:   Respiratory:   Cardiovascular:   Abdomen:   Extremities:   Skin:   Neurological:  Psychiatry:    LINES:    HOSPITAL MEDICATIONS:  MEDICATIONS  (STANDING):  aspirin  chewable 81 milliGRAM(s) Oral daily  chlorhexidine 0.12% Liquid 15 milliLiter(s) Oral Mucosa every 12 hours  chlorhexidine 2% Cloths 1 Application(s) Topical <User Schedule>  heparin   Injectable 5000 Unit(s) SubCutaneous every 8 hours  insulin glargine Injectable (LANTUS) 20 Unit(s) SubCutaneous every morning  insulin glargine Injectable (LANTUS) 20 Unit(s) SubCutaneous at bedtime  insulin lispro (ADMELOG) corrective regimen sliding scale   SubCutaneous every 6 hours  insulin lispro Injectable (ADMELOG) 8 Unit(s) SubCutaneous every 6 hours  lacosamide IVPB 100 milliGRAM(s) IV Intermittent <User Schedule>  levETIRAcetam  IVPB 1000 milliGRAM(s) IV Intermittent <User Schedule>  polyethylene glycol 3350 17 Gram(s) Oral daily  senna 2 Tablet(s) Oral at bedtime  valproic  acid Syrup 500 milliGRAM(s) Enteral Tube every 12 hours    MEDICATIONS  (PRN):  acetaminophen     Tablet .. 650 milliGRAM(s) Oral every 6 hours PRN Temp greater or equal to 38C (100.4F), Mild Pain (1 - 3)      LABS:                        13.4   10.94 )-----------( 225      ( 23 Nov 2022 04:50 )             43.1     Hgb Trend: 13.4<--, 13.6<--, 13.8<--, 14.6<--, 13.4<--  11-23    151<H>  |  118<H>  |  58<H>  ----------------------------<  136<H>  4.5   |  30  |  1.12    Ca    8.4<L>      23 Nov 2022 04:50  Phos  2.9     11-23  Mg     3.0     11-23    TPro  6.0  /  Alb  2.0<L>  /  TBili  0.6  /  DBili  x   /  AST  31  /  ALT  54  /  AlkPhos  77  11-23              MICROBIOLOGY:     RADIOLOGY:  [ ] Reviewed and interpreted by me CHIEF COMPLAINT:    Interval Events:  Tmax 100.5  PSV this morning    REVIEW OF SYSTEMS:  [ x] Unable to assess ROS because encephalopathy    OBJECTIVE:  ICU Vital Signs Last 24 Hrs  T(C): 37.6 (23 Nov 2022 06:00), Max: 38.2 (22 Nov 2022 15:04)  T(F): 99.6 (23 Nov 2022 06:00), Max: 100.8 (22 Nov 2022 20:05)  HR: 84 (23 Nov 2022 07:00) (82 - 96)  BP: 126/57 (23 Nov 2022 07:00) (96/76 - 135/74)  BP(mean): 73 (23 Nov 2022 07:00) (68 - 91)  ABP: --  ABP(mean): --  RR: 16 (23 Nov 2022 07:00) (14 - 36)  SpO2: 97% (23 Nov 2022 07:00) (96% - 100%)      Mode: AC/ CMV (Assist Control/ Continuous Mandatory Ventilation), RR (machine): 14, TV (machine): 450, FiO2: 30, PEEP: 5, ITime: 1, MAP: 9, PIP: 29    11-22 @ 07:01  -  11-23 @ 07:00  --------------------------------------------------------  IN: 2960 mL / OUT: 600 mL / NET: 2360 mL      CAPILLARY BLOOD GLUCOSE      POCT Blood Glucose.: 122 mg/dL (23 Nov 2022 05:47)      PHYSICAL EXAM:  General: NAD, non toxic appearing  HEENT: ETT and NGT in place  Neck: supple  Respiratory: CTA b/l  Cardiovascular: s1s2 RRR  Abdomen: soft, non tender, non distended  Extremities: warm, no edema or clubbing  Skin: L ear injury and sacral wound  Neurological: opening eyes, tracking somewhat, trying to move hands and feet  Psychiatry: unable to assess    LINES:  [facility] of [facility] the [place]    HOSPITAL MEDICATIONS:  MEDICATIONS  (STANDING):  aspirin  chewable 81 milliGRAM(s) Oral daily  chlorhexidine 0.12% Liquid 15 milliLiter(s) Oral Mucosa every 12 hours  chlorhexidine 2% Cloths 1 Application(s) Topical <User Schedule>  heparin   Injectable 5000 Unit(s) SubCutaneous every 8 hours  insulin glargine Injectable (LANTUS) 20 Unit(s) SubCutaneous every morning  insulin glargine Injectable (LANTUS) 20 Unit(s) SubCutaneous at bedtime  insulin lispro (ADMELOG) corrective regimen sliding scale   SubCutaneous every 6 hours  insulin lispro Injectable (ADMELOG) 8 Unit(s) SubCutaneous every 6 hours  lacosamide IVPB 100 milliGRAM(s) IV Intermittent <User Schedule>  levETIRAcetam  IVPB 1000 milliGRAM(s) IV Intermittent <User Schedule>  polyethylene glycol 3350 17 Gram(s) Oral daily  senna 2 Tablet(s) Oral at bedtime  valproic  acid Syrup 500 milliGRAM(s) Enteral Tube every 12 hours    MEDICATIONS  (PRN):  acetaminophen     Tablet .. 650 milliGRAM(s) Oral every 6 hours PRN Temp greater or equal to 38C (100.4F), Mild Pain (1 - 3)      LABS:                        13.4   10.94 )-----------( 225      ( 23 Nov 2022 04:50 )             43.1     Hgb Trend: 13.4<--, 13.6<--, 13.8<--, 14.6<--, 13.4<--  11-23    151<H>  |  118<H>  |  58<H>  ----------------------------<  136<H>  4.5   |  30  |  1.12    Ca    8.4<L>      23 Nov 2022 04:50  Phos  2.9     11-23  Mg     3.0     11-23    TPro  6.0  /  Alb  2.0<L>  /  TBili  0.6  /  DBili  x   /  AST  31  /  ALT  54  /  AlkPhos  77  11-23              MICROBIOLOGY:     RADIOLOGY:  [ ] Reviewed and interpreted by me

## 2022-11-23 NOTE — EEG REPORT - NS EEG TEXT BOX
CONTINUOUS VIDEO EEG RECORDING     Start date/time: 11/22/22 08:00  End date/time: 11/22/22 14:19PM  Duration: 6 hrs  Study Interpretation:    FINDINGS:      Background:  Symmetry: Asymmetric   Continuous: continuous  PDR: absent   Reactivity: absent   Voltage: normal, mostly 20-150uV  Anterior Posterior Gradient: absent   Breach: absent    Background Slowing:  Generalized slowing: diffuse theta and delta activity present     Focal slowing:    Continuous left hemispheric delta and theta    State Changes:   Absent    Rhythmic and Periodic Patterns (RPPs):  Abundant lateralized periodic discharges predominantly over the left parietal region max P3 field at times to P4/Cz/Pz, mostly 1-2hz, at times occurring as BIRDs for 3-5secs at frequencies up to 4-5hz with no clinical correlate or evolution.      Electrographic and Electroclinical seizures:  Discharges at times more broadly distributed, some twitching with LPDs electrographically may suggest intermittent myoclonus, difficult to appreciate on video    Other Clinical Events:  None    Activation Procedures:   -Hyperventilation was not performed.    -Photic stimulation was performed and did not elicit any abnormalities.      Artifacts:  Intermittent myogenic and movement artifacts were noted.    ECG:  ECG lead w significant artifact     EEG Classification / Summary:    Abnormal EEG awake / drowsy / sleep  1. LPDs in left parietal region, mostly 1-2hz, at times occurring as BIRDs for 3-5secs at frequencies up to 4-5hz. Some twitching with LPDs electrographically may suggest intermittent myoclonus, difficult to appreciate on video  2. Focal slowing in left hemisphere  3. Background slowing, moderately severe, generalized     Clinical Impression:  Findings indicate:  1. Evidence for left parietal lesion with high risk for seizures. Subtle myoclonic seizure activity suspected intermittently with some of the LPDs  2. Severe diffuse/multifocal cerebral dysfunction, not specific as to etiology.      Jerome Arias MD  Epilepsy Fellow , [facility]  Department of Neurology, [facility]    [facility] EEG Reading Room Ph#: [phone]  Epilepsy Answering Service after 5PM and before 8:30AM: Ph#: [phone]     CONTINUOUS VIDEO EEG RECORDING     Start date/time: 11/22/22 08:00  End date/time: 11/22/22 14:19  Duration: 6 hrs  Study Interpretation:    FINDINGS:      Background:  Symmetry: Asymmetric   Continuous: continuous  PDR: absent   Reactivity: absent   Voltage: normal, mostly 20-150uV  Anterior Posterior Gradient: absent   Breach: absent    Background Slowing:  Generalized slowing: diffuse theta and delta activity present     Focal slowing:    Continuous left hemispheric delta and theta    State Changes:   Absent    Rhythmic and Periodic Patterns (RPPs):  Abundant lateralized periodic discharges predominantly over the left parietal region max P3 field at times to P4/Cz/Pz, mostly 1-2hz, at times occurring as BIRDs for 3-5secs at frequencies up to 4-5hz with no clinical correlate or evolution.      Electrographic and Electroclinical seizures:  Discharges at times more broadly distributed, some twitching with LPDs electrographically may suggest intermittent myoclonus, difficult to appreciate on video    Other Clinical Events:  None    Activation Procedures:   -Hyperventilation was not performed.    -Photic stimulation was performed and did not elicit any abnormalities.      Artifacts:  Intermittent myogenic and movement artifacts were noted.    ECG:  ECG lead w significant artifact     EEG Classification / Summary:    Abnormal EEG awake / drowsy / sleep  1. LPDs in left parietal region, mostly 1-2hz, at times occurring as BIRDs for 3-5secs at frequencies up to 4-5hz. Some twitching with LPDs electrographically may suggest intermittent myoclonus, difficult to appreciate on video  2. Focal slowing in left hemisphere  3. Background slowing, moderately severe, generalized     Clinical Impression:  Findings indicate:  1. Evidence for left parietal lesion with high risk for seizures. Subtle myoclonic seizure activity suspected intermittently with some of the LPDs  2. Severe diffuse/multifocal cerebral dysfunction, not specific as to etiology.      Jerome Arias MD  Epilepsy Fellow , [facility]  Department of Neurology, [facility]    [facility] EEG Reading Room Ph#: [phone]  Epilepsy Answering Service after 5PM and before 8:30AM: Ph#: [phone]

## 2022-11-24 LAB
ALBUMIN SERPL ELPH-MCNC: 2 G/DL — LOW (ref 3.3–5)
ALP SERPL-CCNC: 85 U/L — SIGNIFICANT CHANGE UP (ref 40–120)
ALT FLD-CCNC: 43 U/L — SIGNIFICANT CHANGE UP (ref 12–78)
ANION GAP SERPL CALC-SCNC: 5 MMOL/L — SIGNIFICANT CHANGE UP (ref 5–17)
AST SERPL-CCNC: 25 U/L — SIGNIFICANT CHANGE UP (ref 15–37)
BASOPHILS # BLD AUTO: 0.06 K/UL — SIGNIFICANT CHANGE UP (ref 0–0.2)
BASOPHILS NFR BLD AUTO: 0.5 % — SIGNIFICANT CHANGE UP (ref 0–2)
BILIRUB SERPL-MCNC: 0.4 MG/DL — SIGNIFICANT CHANGE UP (ref 0.2–1.2)
BUN SERPL-MCNC: 55 MG/DL — HIGH (ref 7–23)
CALCIUM SERPL-MCNC: 8.8 MG/DL — SIGNIFICANT CHANGE UP (ref 8.5–10.1)
CHLORIDE SERPL-SCNC: 116 MMOL/L — HIGH (ref 96–108)
CO2 SERPL-SCNC: 28 MMOL/L — SIGNIFICANT CHANGE UP (ref 22–31)
CREAT SERPL-MCNC: 1.09 MG/DL — SIGNIFICANT CHANGE UP (ref 0.5–1.3)
CULTURE RESULTS: SIGNIFICANT CHANGE UP
CULTURE RESULTS: SIGNIFICANT CHANGE UP
EGFR: 71 ML/MIN/1.73M2 — SIGNIFICANT CHANGE UP
EOSINOPHIL # BLD AUTO: 0.57 K/UL — HIGH (ref 0–0.5)
EOSINOPHIL NFR BLD AUTO: 4.7 % — SIGNIFICANT CHANGE UP (ref 0–6)
GLUCOSE BLDC GLUCOMTR-MCNC: 109 MG/DL — HIGH (ref 70–99)
GLUCOSE BLDC GLUCOMTR-MCNC: 129 MG/DL — HIGH (ref 70–99)
GLUCOSE BLDC GLUCOMTR-MCNC: 141 MG/DL — HIGH (ref 70–99)
GLUCOSE BLDC GLUCOMTR-MCNC: 98 MG/DL — SIGNIFICANT CHANGE UP (ref 70–99)
GLUCOSE SERPL-MCNC: 140 MG/DL — HIGH (ref 70–99)
HCT VFR BLD CALC: 41.8 % — SIGNIFICANT CHANGE UP (ref 39–50)
HGB BLD-MCNC: 12.9 G/DL — LOW (ref 13–17)
IMM GRANULOCYTES NFR BLD AUTO: 1.1 % — HIGH (ref 0–0.9)
LYMPHOCYTES # BLD AUTO: 1.54 K/UL — SIGNIFICANT CHANGE UP (ref 1–3.3)
LYMPHOCYTES # BLD AUTO: 12.7 % — LOW (ref 13–44)
MAGNESIUM SERPL-MCNC: 3 MG/DL — HIGH (ref 1.6–2.6)
MCHC RBC-ENTMCNC: 30.5 PG — SIGNIFICANT CHANGE UP (ref 27–34)
MCHC RBC-ENTMCNC: 30.9 G/DL — LOW (ref 32–36)
MCV RBC AUTO: 98.8 FL — SIGNIFICANT CHANGE UP (ref 80–100)
MONOCYTES # BLD AUTO: 0.85 K/UL — SIGNIFICANT CHANGE UP (ref 0–0.9)
MONOCYTES NFR BLD AUTO: 7 % — SIGNIFICANT CHANGE UP (ref 2–14)
NEUTROPHILS # BLD AUTO: 9 K/UL — HIGH (ref 1.8–7.4)
NEUTROPHILS NFR BLD AUTO: 74 % — SIGNIFICANT CHANGE UP (ref 43–77)
NRBC # BLD: 0 /100 WBCS — SIGNIFICANT CHANGE UP (ref 0–0)
PHOSPHATE SERPL-MCNC: 3.2 MG/DL — SIGNIFICANT CHANGE UP (ref 2.5–4.5)
PLATELET # BLD AUTO: 223 K/UL — SIGNIFICANT CHANGE UP (ref 150–400)
POTASSIUM SERPL-MCNC: 4 MMOL/L — SIGNIFICANT CHANGE UP (ref 3.5–5.3)
POTASSIUM SERPL-SCNC: 4 MMOL/L — SIGNIFICANT CHANGE UP (ref 3.5–5.3)
PROCALCITONIN SERPL-MCNC: 0.12 NG/ML — HIGH (ref 0.02–0.1)
PROT SERPL-MCNC: 5.9 GM/DL — LOW (ref 6–8.3)
RBC # BLD: 4.23 M/UL — SIGNIFICANT CHANGE UP (ref 4.2–5.8)
RBC # FLD: 14.1 % — SIGNIFICANT CHANGE UP (ref 10.3–14.5)
SODIUM SERPL-SCNC: 149 MMOL/L — HIGH (ref 135–145)
SPECIMEN SOURCE: SIGNIFICANT CHANGE UP
SPECIMEN SOURCE: SIGNIFICANT CHANGE UP
WBC # BLD: 12.15 K/UL — HIGH (ref 3.8–10.5)
WBC # FLD AUTO: 12.15 K/UL — HIGH (ref 3.8–10.5)

## 2022-11-24 PROCEDURE — 99252 IP/OBS CONSLTJ NEW/EST SF 35: CPT

## 2022-11-24 PROCEDURE — 99291 CRITICAL CARE FIRST HOUR: CPT

## 2022-11-24 RX ADMIN — Medication 8 UNIT(S): at 05:37

## 2022-11-24 RX ADMIN — Medication 500 MILLIGRAM(S): at 13:48

## 2022-11-24 RX ADMIN — POLYETHYLENE GLYCOL 3350 17 GRAM(S): 17 POWDER, FOR SOLUTION ORAL at 12:30

## 2022-11-24 RX ADMIN — CHLORHEXIDINE GLUCONATE 15 MILLILITER(S): 213 SOLUTION TOPICAL at 05:38

## 2022-11-24 RX ADMIN — Medication 500 MILLIGRAM(S): at 23:40

## 2022-11-24 RX ADMIN — LEVETIRACETAM 400 MILLIGRAM(S): 250 TABLET, FILM COATED ORAL at 05:38

## 2022-11-24 RX ADMIN — LEVETIRACETAM 400 MILLIGRAM(S): 250 TABLET, FILM COATED ORAL at 13:47

## 2022-11-24 RX ADMIN — LACOSAMIDE 120 MILLIGRAM(S): 50 TABLET ORAL at 23:33

## 2022-11-24 RX ADMIN — Medication 8 UNIT(S): at 12:29

## 2022-11-24 RX ADMIN — LEVETIRACETAM 400 MILLIGRAM(S): 250 TABLET, FILM COATED ORAL at 23:33

## 2022-11-24 RX ADMIN — HEPARIN SODIUM 5000 UNIT(S): 5000 INJECTION INTRAVENOUS; SUBCUTANEOUS at 23:33

## 2022-11-24 RX ADMIN — Medication 8 UNIT(S): at 17:51

## 2022-11-24 RX ADMIN — SENNA PLUS 2 TABLET(S): 8.6 TABLET ORAL at 23:34

## 2022-11-24 RX ADMIN — Medication 500 MILLIGRAM(S): at 05:38

## 2022-11-24 RX ADMIN — CHLORHEXIDINE GLUCONATE 15 MILLILITER(S): 213 SOLUTION TOPICAL at 17:50

## 2022-11-24 RX ADMIN — LACOSAMIDE 120 MILLIGRAM(S): 50 TABLET ORAL at 05:32

## 2022-11-24 RX ADMIN — INSULIN GLARGINE 20 UNIT(S): 100 INJECTION, SOLUTION SUBCUTANEOUS at 08:30

## 2022-11-24 RX ADMIN — HEPARIN SODIUM 5000 UNIT(S): 5000 INJECTION INTRAVENOUS; SUBCUTANEOUS at 13:48

## 2022-11-24 RX ADMIN — HEPARIN SODIUM 5000 UNIT(S): 5000 INJECTION INTRAVENOUS; SUBCUTANEOUS at 05:38

## 2022-11-24 RX ADMIN — LACOSAMIDE 120 MILLIGRAM(S): 50 TABLET ORAL at 13:47

## 2022-11-24 RX ADMIN — Medication 81 MILLIGRAM(S): at 12:30

## 2022-11-24 RX ADMIN — CHLORHEXIDINE GLUCONATE 1 APPLICATION(S): 213 SOLUTION TOPICAL at 05:37

## 2022-11-24 NOTE — PROGRESS NOTE ADULT - SUBJECTIVE AND OBJECTIVE BOX
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 Unknown how long pt unresponsive for at home. Blood sugar in the 40s with EMS s/p glucagon. On arrival to ER pt hypothermic and agonally breathing. Pt became unresponsive with loss of pulse; ACLS initiated. PEA arrest with ROSC after approximately 5 min s/p Epi x2.  Pt intubated during CODE BLUE. Per son pt on the day prior to presentation reported low blood sugar reads on his glucometer in the range of 80s. Son believes pt continued to take his insulin and oral diabetic regimen. Son states that pt was eating but may have been eating less in the last few days. Later on pt noted to have tonic-clonic Sz. ICU dx: 1) Hypoglycemia 2) PEA arrest 3) Takotsubo cardiomyopathy found on Echo 4) New onset Sz either 2 to hypoglycemia vs anoxic encephalopathy 5) ELMER 2 to ATN 6) Shock liver       ## Labs:  CBC:                        12.9   12.15 )-----------( 223      ( 2022 02:10 )             41.8     Chem:      149<H>  |  116<H>  |  55<H>  ----------------------------<  140<H>  4.0   |  28  |  1.09    Ca    8.8      2022 02:10  Phos  3.2       Mg     3.0         TPro  5.9<L>  /  Alb  2.0<L>  /  TBili  0.4  /  DBili  x   /  AST  25  /  ALT  43  /  AlkPhos  85      Coags:          ## Imaging:    ## Medications:        insulin glargine Injectable (LANTUS) 20 Unit(s) SubCutaneous every morning  insulin glargine Injectable (LANTUS) 20 Unit(s) SubCutaneous at bedtime  insulin lispro (ADMELOG) corrective regimen sliding scale   SubCutaneous every 6 hours  insulin lispro Injectable (ADMELOG) 8 Unit(s) SubCutaneous every 6 hours    aspirin  chewable 81 milliGRAM(s) Oral daily  heparin   Injectable 5000 Unit(s) SubCutaneous every 8 hours    polyethylene glycol 3350 17 Gram(s) Oral daily  senna 2 Tablet(s) Oral at bedtime    acetaminophen     Tablet .. 650 milliGRAM(s) Oral every 6 hours PRN  lacosamide IVPB 100 milliGRAM(s) IV Intermittent <User Schedule>  levETIRAcetam  IVPB 1000 milliGRAM(s) IV Intermittent <User Schedule>  valproic  acid Syrup 500 milliGRAM(s) Enteral Tube every 8 hours      ## Vitals:  T(C): 37.8 (22 @ 08:00), Max: 38.2 (22 @ 15:00)  HR: 93 (22 @ 09:00) (81 - 100)  BP: 149/72 (22 @ 09:00) (91/69 - 149/72)  BP(mean): 93 (22 @ 09:00) (68 - 95)  RR: 20 (22 @ 09:00) (15 - 30)  SpO2: 100% (22 @ 09:00) (96% - 100%)  Wt(kg): --  Vent: Mode: CPAP with PS, RR (patient): 30, FiO2: 30, PEEP: 5, PS: 15, PIP: 21  AB-23 @ 07:  -   @ 07:00  --------------------------------------------------------  IN: 3050 mL / OUT: 250 mL / NET: 2800 mL     @ 07:01  -   @ 11:15  --------------------------------------------------------  IN: 410 mL / OUT: 0 mL / NET: 410 mL          ## P/E:  Gen: lying comfortably in bed in no apparent distress  Mouth: (+) ETT  Lungs: Coarse BS  Heart: Paced  Abd: Soft/+BS/ Non-tender  Ext: UE edema  Neuro: Tracking with eyes and spont moving all extremities    CENTRAL LINE: [ ] YES [ ] NO  LOCATION:   DATE INSERTED:  REMOVE: [ ] YES [ ] NO      LUJAN: [ ] YES [ ] NO    DATE INSERTED:  REMOVE:  [ ] YES [ ] NO      A-LINE:  [ ] YES [ ] NO  LOCATION:   DATE INSERTED:  REMOVE:  [ ] YES [ ] NO  EXPLAIN:      CODE STATUS: [x ] full code  [ ] DNR  [ ] DNI  [ ] MOLST  Goals of care discussion: [ ] yes

## 2022-11-24 NOTE — PROGRESS NOTE ADULT - ASSESSMENT
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 Unknown how long pt unresponsive for at home. Blood sugar in the 40s with EMS s/p glucagon. On arrival to ER pt hypothermic and agonally breathing. Pt became unresponsive with loss of pulse; ACLS initiated. PEA arrest with ROSC after approximately 5 min s/p Epi x2.  Pt intubated during CODE BLUE. Per son pt on the day prior to presentation reported low blood sugar reads on his glucometer in the range of 80s. Son believes pt continued to take his insulin and oral diabetic regimen. Son states that pt was eating but may have been eating less in the last few days. Later on pt noted to have tonic-clonic Sz. ICU dx: 1) Hypoglycemia 2) PEA arrest 3) Takotsubo cardiomyopathy found on Echo 4) New onset Sz either 2 to hypoglycemia vs anoxic encephalopathy 5) ELMER 2 to ATN 6) Shock liver     Resp: MS has improved but requiring increased PS for weaning; son agrees for trach  ID: Off Abx  Heme: DVT prophylaxis with sq Heparin   FEN: Cont enteral feeds/ Cont free H2O 2 to hypernatremia  Endo: Adjust Lantus + Lispro to FS  GI: Will need PEG  Renal: Renal fxn has normalized; cont to followBUN/Cr and UO  Neuro: MS has improved/ Cont AEDs x3/ F/u as per Neuro  Social: Son agrees for Trach/PEG

## 2022-11-24 NOTE — CONSULT NOTE ADULT - SUBJECTIVE AND OBJECTIVE BOX
GENERAL SURGERY CONSULT NOTE    Patient is a 72y old  Male who presents with a chief complaint of s/p cardiac arrest, hypoglycemia (23 Nov 2022 08:03)    HPI:  Mr. Hamilton is a 72 year old male with a PMH of lacunar infarct, CHF (EF ~40% per son), CAD s/p PCI with stent and CABGx3 (~2020), s/p PPM (medtronic), HTN, COPD, DMII on insulin and PO meds, BPH, and CKD (stage II?) presenting to ICU s/p cardiac arrest in ED. Per patient son he has recently been doing well and in his normal state of health, however, yesterday Mr. Hamilton started c/o low blood sugars on glucometer. Pt reportedly continued to take his home medications, including both oral antidiabetic agents and insulin (both long and short acting). Today his son found him minimally responsive hanging off his bed, checked his glucose and it read "low," so he called 911. In ED pt was noted to be hypothermic with glucose noted to be 70 just prior to PEA arrest. Pt was coded for ~5 minutes with 2 epi given with ROSC. Pt intubated at that time. is moving all extremities equally and with reactive, equal pupils. He remained hypothermic and is not requiring vasopressor support. Pending CT Scans. ROS otherwise negative per son besides hypoglycemia, with no known sick contacts. Pt accepted to ICU for further management / care.  (14 Nov 2022 01:33)    Patient seen and examined in the ICU resting comfortably, intubated.    REVIEW OF SYSTEMS:  CONSTITUTIONAL: No fever, weight loss, or fatigue  EYES: No eye pain, visual disturbances, discharge  ENMT:  No difficulty hearing, tinnitus, vertigo; No sinus or throat pain  NECK: No pain or stiffness  BREASTS: No pain, masses, or nipple discharge  RESPIRATORY: No cough, wheezing, chills or hemoptysis; No shortness of breath  CARDIOVASCULAR: No chest pain, palpitations, dizziness, or leg swelling  GASTROINTESTINAL: No abdominal or epigastric pain. No nausea, vomiting, or hematemesis; No diarrhea or constipation. No melena or hematochezia.  GENITOURINARY: No dysuria, frequency, hematuria, or incontinence  NEUROLOGICAL: No headaches, memory loss, loss of strength, numbness, or tremors  SKIN: No itching, burning, rashes, or lesions   LYMPH NODES: No enlarged glands  ENDOCRINE: No heat or cold intolerance; No hair loss  MUSCULOSKELETAL: No joint pain or swelling; No muscle, back, or extremity pain  PSYCHIATRIC: No depression, anxiety, mood swings, or difficulty sleeping  HEME/LYMPH: No easy bruising, or bleeding gums  ALLERY AND IMMUNOLOGIC: No hives or eczema     PAST MEDICAL & SURGICAL HISTORY:  HTN (hypertension)  HLD (hyperlipidemia)  Diabetes mellitus  Lacunar infarction  BPH (benign prostatic hyperplasia)  CHF (congestive heart failure)  Chronic obstructive pulmonary disease, unspecified COPD type  S/P CABG (coronary artery bypass graft)  2014    MEDICATIONS  (STANDING):  aspirin  chewable 81 milliGRAM(s) Oral daily  chlorhexidine 0.12% Liquid 15 milliLiter(s) Oral Mucosa every 12 hours  chlorhexidine 2% Cloths 1 Application(s) Topical <User Schedule>  heparin   Injectable 5000 Unit(s) SubCutaneous every 8 hours  insulin glargine Injectable (LANTUS) 20 Unit(s) SubCutaneous every morning  insulin glargine Injectable (LANTUS) 20 Unit(s) SubCutaneous at bedtime  insulin lispro (ADMELOG) corrective regimen sliding scale   SubCutaneous every 6 hours  insulin lispro Injectable (ADMELOG) 8 Unit(s) SubCutaneous every 6 hours  lacosamide IVPB 100 milliGRAM(s) IV Intermittent <User Schedule>  levETIRAcetam  IVPB 1000 milliGRAM(s) IV Intermittent <User Schedule>  polyethylene glycol 3350 17 Gram(s) Oral daily  senna 2 Tablet(s) Oral at bedtime  valproic  acid Syrup 500 milliGRAM(s) Enteral Tube every 8 hours    MEDICATIONS  (PRN):  acetaminophen     Tablet .. 650 milliGRAM(s) Oral every 6 hours PRN Temp greater or equal to 38C (100.4F), Mild Pain (1 - 3)    Allergies  No Known Allergies    SOCIAL HISTORY: Pt is retired, lives alone. No tobacco, drug or alcohol use.           FAMILY HISTORY: No known family history.       Vital Signs Last 24 Hrs  T(C): 37.7 (23 Nov 2022 23:00), Max: 38.2 (23 Nov 2022 15:00)  T(F): 99.9 (23 Nov 2022 23:00), Max: 100.8 (23 Nov 2022 15:00)  HR: 88 (23 Nov 2022 23:00) (84 - 100)  BP: 107/68 (23 Nov 2022 23:00) (91/69 - 135/78)  BP(mean): 77 (23 Nov 2022 23:00) (70 - 93)  RR: 15 (23 Nov 2022 23:00) (15 - 28)  SpO2: 97% (23 Nov 2022 23:00) (96% - 100%)    Parameters below as of 23 Nov 2022 08:00  Patient On (Oxygen Delivery Method): ventilator    O2 Concentration (%): 30    PHYSICAL EXAM:  CONSTITUTIONAL: NAD, well-developed  HEAD:  Atraumatic, Normocephalic  EYES: Conjunctiva and sclera clear  ENMT: No tonsillar erythema, exudates, or enlargement; Moist mucous membranes, No lesions  NECK: Supple, No JVD, Normal thyroid  NERVOUS SYSTEM:  Alert & Oriented X3  RESPIRATORY: Intubated. Clear to auscultation bilaterally; No rales, rhonchi, wheezing  CARDIOVASCULAR: Regular rate and rhythm. S1S2  GASTROINTESTINAL: Nondistended, +BS, soft, nontender, no guarding, no rigidity   MUSCULOSKELETAL:  2+ Peripheral Pulses, No clubbing, cyanosis, or edema     LABS:                        13.4   10.94 )-----------( 225      ( 23 Nov 2022 04:50 )             43.1     11-23    151<H>  |  118<H>  |  58<H>  ----------------------------<  136<H>  4.5   |  30  |  1.12    Ca    8.4<L>      23 Nov 2022 04:50  Phos  2.9     11-23  Mg     3.0     11-23    TPro  6.0  /  Alb  2.0<L>  /  TBili  0.6  /  DBili  x   /  AST  31  /  ALT  54  /  AlkPhos  77  11-23    Culture Results:   No growth to date. (11-19 @ 09:15)  Culture Results:   No growth to date. (11-18 @ 04:02)  Culture Results:   Few Aspergillus species  Normal Respiratory Larissa present (11-18 @ 03:12)      < from: CT Abdomen and Pelvis w/ IV Cont (11.14.22 @ 01:00) >  FINDINGS:  CHEST:  LUNGS AND LARGE AIRWAYS: Cystic bronchiectasis in the left lower lobe   with scattered nodular opacities measuring up to 1 cm. Endotracheal tube   terminates above the derek. Small amount of bubbly opacity in the distal   trachea extends into the right main bronchus.  PLEURA: No pleural effusion.  VESSELS: Pulmonary arteries are opacified to the segmental level, no   acute thromboembolus. Main pulmonary artery is normal in caliber. Pruning   of the left lower lobe pulmonary artery branches related to regional   scarring.  Atherosclerotic change of the thoracic aorta without aneurysm.  HEART: Heart size is normal. No pericardial effusion. Pacemaker leads in   the right atrium and right ventricle. Changes of CABG.  MEDIASTINUM AND JAE: Enteric tube terminates above the hiatus, and   should be advanced. No mediastinal hematoma.  CHEST WALL AND LOWER NECK: No emphysema.    ABDOMEN AND PELVIS:  LIVER: Within normal limits.  BILE DUCTS: Normal caliber.  GALLBLADDER: Within normal limits.  SPLEEN: Within normal limits.  PANCREAS: Atrophic pancreatic tail  ADRENALS: Within normal limits.  KIDNEYS/URETERS: Bilateral cysts    BLADDER: Decompressed by a Tillman catheter  REPRODUCTIVE ORGANS: Enlarged prostate    BOWEL: No bowel obstruction. Normal appendix. Pedunculated lipoma in the   distal duodenum.  PERITONEUM: No ascites.  VESSELS: Atherosclerotic changes.  RETROPERITONEUM/LYMPH NODES: No enlarged lymph nodes measuring greater   than 10 mm in short axis  ABDOMINAL WALL: Postsurgical changes. In the epigastric region. Tiny   fat-containing periumbilical hernia.  BONES: Acute, displaced fracture of the lateral right fourth rib.   Nondisplaced fracture of the anterolateral right fifth and sixth ribs.   Other healed rib fractures.    IMPRESSION:    1. Acute fractures of the lateral right fourth through sixth ribs.  2. Smallleft lower lobe nodules, measuring up to 1 cm. 6-12 month CT   chest follow-up advised.    < end of copied text >

## 2022-11-25 LAB
ALBUMIN SERPL ELPH-MCNC: 1.9 G/DL — LOW (ref 3.3–5)
ALP SERPL-CCNC: 94 U/L — SIGNIFICANT CHANGE UP (ref 40–120)
ALT FLD-CCNC: 38 U/L — SIGNIFICANT CHANGE UP (ref 12–78)
ANION GAP SERPL CALC-SCNC: 6 MMOL/L — SIGNIFICANT CHANGE UP (ref 5–17)
AST SERPL-CCNC: 33 U/L — SIGNIFICANT CHANGE UP (ref 15–37)
BILIRUB SERPL-MCNC: 0.4 MG/DL — SIGNIFICANT CHANGE UP (ref 0.2–1.2)
BUN SERPL-MCNC: 46 MG/DL — HIGH (ref 7–23)
CALCIUM SERPL-MCNC: 8.6 MG/DL — SIGNIFICANT CHANGE UP (ref 8.5–10.1)
CHLORIDE SERPL-SCNC: 111 MMOL/L — HIGH (ref 96–108)
CO2 SERPL-SCNC: 29 MMOL/L — SIGNIFICANT CHANGE UP (ref 22–31)
CREAT SERPL-MCNC: 1.1 MG/DL — SIGNIFICANT CHANGE UP (ref 0.5–1.3)
CULTURE RESULTS: SIGNIFICANT CHANGE UP
EGFR: 70 ML/MIN/1.73M2 — SIGNIFICANT CHANGE UP
GLUCOSE BLDC GLUCOMTR-MCNC: 132 MG/DL — HIGH (ref 70–99)
GLUCOSE BLDC GLUCOMTR-MCNC: 160 MG/DL — HIGH (ref 70–99)
GLUCOSE BLDC GLUCOMTR-MCNC: 206 MG/DL — HIGH (ref 70–99)
GLUCOSE BLDC GLUCOMTR-MCNC: 264 MG/DL — HIGH (ref 70–99)
GLUCOSE SERPL-MCNC: 191 MG/DL — HIGH (ref 70–99)
GRAM STN FLD: SIGNIFICANT CHANGE UP
GRAM STN FLD: SIGNIFICANT CHANGE UP
HCT VFR BLD CALC: 45.6 % — SIGNIFICANT CHANGE UP (ref 39–50)
HGB BLD-MCNC: 14.6 G/DL — SIGNIFICANT CHANGE UP (ref 13–17)
INSULIN FREE SERPL-ACNC: <1 UU/ML — SIGNIFICANT CHANGE UP
INSULIN: <1 UU/ML — SIGNIFICANT CHANGE UP
MAGNESIUM SERPL-MCNC: 2.7 MG/DL — HIGH (ref 1.6–2.6)
MCHC RBC-ENTMCNC: 30.4 PG — SIGNIFICANT CHANGE UP (ref 27–34)
MCHC RBC-ENTMCNC: 32 G/DL — SIGNIFICANT CHANGE UP (ref 32–36)
MCV RBC AUTO: 95 FL — SIGNIFICANT CHANGE UP (ref 80–100)
NRBC # BLD: 0 /100 WBCS — SIGNIFICANT CHANGE UP (ref 0–0)
PHOSPHATE SERPL-MCNC: 2.8 MG/DL — SIGNIFICANT CHANGE UP (ref 2.5–4.5)
PLATELET # BLD AUTO: 189 K/UL — SIGNIFICANT CHANGE UP (ref 150–400)
POTASSIUM SERPL-MCNC: 4.3 MMOL/L — SIGNIFICANT CHANGE UP (ref 3.5–5.3)
POTASSIUM SERPL-SCNC: 4.3 MMOL/L — SIGNIFICANT CHANGE UP (ref 3.5–5.3)
PROT SERPL-MCNC: 5.9 GM/DL — LOW (ref 6–8.3)
RBC # BLD: 4.8 M/UL — SIGNIFICANT CHANGE UP (ref 4.2–5.8)
RBC # FLD: 14 % — SIGNIFICANT CHANGE UP (ref 10.3–14.5)
SODIUM SERPL-SCNC: 146 MMOL/L — HIGH (ref 135–145)
SPECIMEN SOURCE: SIGNIFICANT CHANGE UP
SPECIMEN SOURCE: SIGNIFICANT CHANGE UP
VALPROATE SERPL-MCNC: 52 UG/ML — SIGNIFICANT CHANGE UP (ref 50–100)
WBC # BLD: 14.25 K/UL — HIGH (ref 3.8–10.5)
WBC # FLD AUTO: 14.25 K/UL — HIGH (ref 3.8–10.5)

## 2022-11-25 PROCEDURE — 71045 X-RAY EXAM CHEST 1 VIEW: CPT | Mod: 26

## 2022-11-25 PROCEDURE — 99291 CRITICAL CARE FIRST HOUR: CPT

## 2022-11-25 RX ORDER — CHLORHEXIDINE GLUCONATE 213 G/1000ML
15 SOLUTION TOPICAL EVERY 12 HOURS
Refills: 0 | Status: DISCONTINUED | OUTPATIENT
Start: 2022-11-25 | End: 2023-01-06

## 2022-11-25 RX ORDER — ACETAMINOPHEN 500 MG
1000 TABLET ORAL ONCE
Refills: 0 | Status: COMPLETED | OUTPATIENT
Start: 2022-11-25 | End: 2022-11-25

## 2022-11-25 RX ADMIN — CHLORHEXIDINE GLUCONATE 15 MILLILITER(S): 213 SOLUTION TOPICAL at 06:01

## 2022-11-25 RX ADMIN — HEPARIN SODIUM 5000 UNIT(S): 5000 INJECTION INTRAVENOUS; SUBCUTANEOUS at 15:04

## 2022-11-25 RX ADMIN — Medication 81 MILLIGRAM(S): at 12:12

## 2022-11-25 RX ADMIN — Medication 8 UNIT(S): at 12:11

## 2022-11-25 RX ADMIN — HEPARIN SODIUM 5000 UNIT(S): 5000 INJECTION INTRAVENOUS; SUBCUTANEOUS at 22:35

## 2022-11-25 RX ADMIN — Medication 2: at 23:02

## 2022-11-25 RX ADMIN — Medication 500 MILLIGRAM(S): at 06:01

## 2022-11-25 RX ADMIN — Medication 8 UNIT(S): at 06:24

## 2022-11-25 RX ADMIN — HEPARIN SODIUM 5000 UNIT(S): 5000 INJECTION INTRAVENOUS; SUBCUTANEOUS at 05:58

## 2022-11-25 RX ADMIN — CHLORHEXIDINE GLUCONATE 15 MILLILITER(S): 213 SOLUTION TOPICAL at 17:57

## 2022-11-25 RX ADMIN — INSULIN GLARGINE 20 UNIT(S): 100 INJECTION, SOLUTION SUBCUTANEOUS at 08:55

## 2022-11-25 RX ADMIN — LACOSAMIDE 120 MILLIGRAM(S): 50 TABLET ORAL at 15:02

## 2022-11-25 RX ADMIN — Medication 500 MILLIGRAM(S): at 22:36

## 2022-11-25 RX ADMIN — Medication 650 MILLIGRAM(S): at 23:20

## 2022-11-25 RX ADMIN — Medication 650 MILLIGRAM(S): at 22:34

## 2022-11-25 RX ADMIN — Medication 6: at 06:24

## 2022-11-25 RX ADMIN — POLYETHYLENE GLYCOL 3350 17 GRAM(S): 17 POWDER, FOR SOLUTION ORAL at 12:12

## 2022-11-25 RX ADMIN — Medication 400 MILLIGRAM(S): at 05:58

## 2022-11-25 RX ADMIN — Medication 650 MILLIGRAM(S): at 14:57

## 2022-11-25 RX ADMIN — Medication 650 MILLIGRAM(S): at 12:12

## 2022-11-25 RX ADMIN — CHLORHEXIDINE GLUCONATE 1 APPLICATION(S): 213 SOLUTION TOPICAL at 06:01

## 2022-11-25 RX ADMIN — LACOSAMIDE 120 MILLIGRAM(S): 50 TABLET ORAL at 05:59

## 2022-11-25 RX ADMIN — LEVETIRACETAM 400 MILLIGRAM(S): 250 TABLET, FILM COATED ORAL at 15:04

## 2022-11-25 RX ADMIN — Medication 8 UNIT(S): at 17:57

## 2022-11-25 RX ADMIN — Medication 4: at 12:12

## 2022-11-25 RX ADMIN — Medication 500 MILLIGRAM(S): at 15:02

## 2022-11-25 RX ADMIN — LEVETIRACETAM 400 MILLIGRAM(S): 250 TABLET, FILM COATED ORAL at 22:34

## 2022-11-25 RX ADMIN — SENNA PLUS 2 TABLET(S): 8.6 TABLET ORAL at 22:34

## 2022-11-25 RX ADMIN — INSULIN GLARGINE 20 UNIT(S): 100 INJECTION, SOLUTION SUBCUTANEOUS at 22:35

## 2022-11-25 RX ADMIN — Medication 1000 MILLIGRAM(S): at 06:30

## 2022-11-25 RX ADMIN — LACOSAMIDE 120 MILLIGRAM(S): 50 TABLET ORAL at 22:35

## 2022-11-25 RX ADMIN — LEVETIRACETAM 400 MILLIGRAM(S): 250 TABLET, FILM COATED ORAL at 05:59

## 2022-11-25 NOTE — PROGRESS NOTE ADULT - ASSESSMENT
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 Unknown how long pt unresponsive for at home. Blood sugar in the 40s with EMS s/p glucagon. On arrival to ER pt hypothermic and agonally breathing. Pt became unresponsive with loss of pulse; ACLS initiated. PEA arrest with ROSC after approximately 5 min s/p Epi x2.  Pt intubated during CODE BLUE. Per son pt on the day prior to presentation reported low blood sugar reads on his glucometer in the range of 80s. Son believes pt continued to take his insulin and oral diabetic regimen. Son states that pt was eating but may have been eating less in the last few days. Later on pt noted to have tonic-clonic Sz. ICU dx: 1) Hypoglycemia 2) PEA arrest 3) Takotsubo cardiomyopathy found on Echo 4) New onset Sz either 2 to hypoglycemia vs anoxic injury 5) ELMER 2 to ATN 6) Shock liver     Resp: MS has improved but requiring increased PS (15) for weaning; son agrees for trach/ Check CXR for fever work up; no infiltrate  ID: Febrile; f/u Cx  Heme: DVT prophylaxis with sq Heparin   FEN: Cont enteral feeds/ Cont free H2O 2 to hypernatremia  Endo: Adjust Lantus + Lispro to FS  GI: Son agrees for PEG  Renal: Renal fxn has normalized; cont to follow BUN/Cr and UO  Neuro: MS has improved/ For now cont AEDs x3 and discuss with Neuro to decrease AEDs  Social: Son at bedside updated in detail and agrees for Trach/PEG

## 2022-11-25 NOTE — PROGRESS NOTE ADULT - SUBJECTIVE AND OBJECTIVE BOX
HPI:  Pt is a 73 yo M with h/o COPD, CAD s/p PCI with stent  and CABG , chronic diastolic heart failure (EF 50%), 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 Unknown how long pt unresponsive for at home. Blood sugar in the 40s with EMS s/p glucagon. On arrival to ER pt hypothermic and agonally breathing. Pt became unresponsive with loss of pulse; ACLS initiated. PEA arrest with ROSC after approximately 5 min s/p Epi x2.  Pt intubated during CODE BLUE. Per son pt on the day prior to presentation reported low blood sugar reads on his glucometer in the range of 80s. Son believes pt continued to take his insulin and oral diabetic regimen. Son states that pt was eating but may have been eating less in the last few days. Later on pt noted to have tonic-clonic Sz. ICU dx: 1) Hypoglycemia 2) PEA arrest 3) Takotsubo cardiomyopathy found on Echo 4) New onset Sz either 2 to hypoglycemia vs anoxic injury 5) ELMER 2 to ATN 6) Shock liver     ## Labs:  CBC:                        14.6   14.25 )-----------( 189      ( 2022 03:15 )             45.6     Chem:      146<H>  |  111<H>  |  46<H>  ----------------------------<  191<H>  4.3   |  29  |  1.10    Ca    8.6      2022 03:15  Phos  2.8       Mg     2.7         TPro  5.9<L>  /  Alb  1.9<L>  /  TBili  0.4  /  DBili  x   /  AST  33  /  ALT  38  /  AlkPhos  94      Coags:          ## Imaging:    ## Medications:        insulin glargine Injectable (LANTUS) 20 Unit(s) SubCutaneous every morning  insulin glargine Injectable (LANTUS) 20 Unit(s) SubCutaneous at bedtime  insulin lispro (ADMELOG) corrective regimen sliding scale   SubCutaneous every 6 hours    aspirin  chewable 81 milliGRAM(s) Oral daily  heparin   Injectable 5000 Unit(s) SubCutaneous every 8 hours    polyethylene glycol 3350 17 Gram(s) Oral daily  senna 2 Tablet(s) Oral at bedtime    acetaminophen     Tablet .. 650 milliGRAM(s) Oral every 6 hours PRN  lacosamide IVPB 100 milliGRAM(s) IV Intermittent <User Schedule>  levETIRAcetam  IVPB 1000 milliGRAM(s) IV Intermittent <User Schedule>  valproic  acid Syrup 500 milliGRAM(s) Enteral Tube every 8 hours      ## Vitals:  T(C): 37.8 (22 @ 19:00), Max: 39.6 (22 @ 05:00)  HR: 89 (22 @ 21:00) (76 - 102)  BP: 107/65 (22 @ 21:00) (84/59 - 137/63)  BP(mean): 74 (22 @ 21:00) (65 - 90)  RR: 16 (22 @ :00) (16 - 32)  SpO2: 100% (22 @ 21:00) (96% - 100%)  Wt(kg): --  Vent: Mode: AC/ CMV (Assist Control/ Continuous Mandatory Ventilation), RR (machine): 14, RR (patient): 19, TV (machine): 450, FiO2: 30, PEEP: 5, PIP: 20  AB-24 @ 07:01  -   @ 07:00  --------------------------------------------------------  IN: 3065 mL / OUT: 0 mL / NET: 3065 mL     @ 07:01  -   @ 22:57  --------------------------------------------------------  IN: 1395 mL / OUT: 0 mL / NET: 1395 mL          ## P/E:  Gen: lying comfortably in bed in no apparent distress  Mouth: (+) ETT  Lungs: Coarse BS  Heart: Paced  Abd: Soft/+BS/ Non-tender  Ext: Hand edema  Neuro: Tracking with eyes and following some commands    CENTRAL LINE: [ ] YES [ ] NO  LOCATION:   DATE INSERTED:  REMOVE: [ ] YES [ ] NO      LUJAN: [ ] YES [ ] NO    DATE INSERTED:  REMOVE:  [ ] YES [ ] NO      A-LINE:  [ ] YES [ ] NO  LOCATION:   DATE INSERTED:  REMOVE:  [ ] YES [ ] NO  EXPLAIN:      CODE STATUS: [x ] full code  [ ] DNR  [ ] DNI  [ ] MOLST  Goals of care discussion: [ ] yes

## 2022-11-26 LAB
ALBUMIN SERPL ELPH-MCNC: 1.7 G/DL — LOW (ref 3.3–5)
ALP SERPL-CCNC: 106 U/L — SIGNIFICANT CHANGE UP (ref 40–120)
ALT FLD-CCNC: 39 U/L — SIGNIFICANT CHANGE UP (ref 12–78)
ANION GAP SERPL CALC-SCNC: 4 MMOL/L — LOW (ref 5–17)
APPEARANCE UR: CLEAR — SIGNIFICANT CHANGE UP
AST SERPL-CCNC: 37 U/L — SIGNIFICANT CHANGE UP (ref 15–37)
BACTERIA # UR AUTO: ABNORMAL
BASOPHILS # BLD AUTO: 0 K/UL — SIGNIFICANT CHANGE UP (ref 0–0.2)
BASOPHILS NFR BLD AUTO: 0 % — SIGNIFICANT CHANGE UP (ref 0–2)
BILIRUB SERPL-MCNC: 0.3 MG/DL — SIGNIFICANT CHANGE UP (ref 0.2–1.2)
BILIRUB UR-MCNC: NEGATIVE — SIGNIFICANT CHANGE UP
BUN SERPL-MCNC: 46 MG/DL — HIGH (ref 7–23)
CALCIUM SERPL-MCNC: 8.6 MG/DL — SIGNIFICANT CHANGE UP (ref 8.5–10.1)
CHLORIDE SERPL-SCNC: 110 MMOL/L — HIGH (ref 96–108)
CO2 SERPL-SCNC: 29 MMOL/L — SIGNIFICANT CHANGE UP (ref 22–31)
COLOR SPEC: YELLOW — SIGNIFICANT CHANGE UP
CREAT SERPL-MCNC: 1.05 MG/DL — SIGNIFICANT CHANGE UP (ref 0.5–1.3)
DIFF PNL FLD: ABNORMAL
EGFR: 74 ML/MIN/1.73M2 — SIGNIFICANT CHANGE UP
EOSINOPHIL # BLD AUTO: 0.19 K/UL — SIGNIFICANT CHANGE UP (ref 0–0.5)
EOSINOPHIL NFR BLD AUTO: 1 % — SIGNIFICANT CHANGE UP (ref 0–6)
EPI CELLS # UR: SIGNIFICANT CHANGE UP
GLUCOSE BLDC GLUCOMTR-MCNC: 224 MG/DL — HIGH (ref 70–99)
GLUCOSE BLDC GLUCOMTR-MCNC: 270 MG/DL — HIGH (ref 70–99)
GLUCOSE BLDC GLUCOMTR-MCNC: 287 MG/DL — HIGH (ref 70–99)
GLUCOSE BLDC GLUCOMTR-MCNC: 293 MG/DL — HIGH (ref 70–99)
GLUCOSE SERPL-MCNC: 215 MG/DL — HIGH (ref 70–99)
GLUCOSE UR QL: 1000 MG/DL
HCT VFR BLD CALC: 37.5 % — LOW (ref 39–50)
HGB BLD-MCNC: 11.8 G/DL — LOW (ref 13–17)
KETONES UR-MCNC: ABNORMAL
LEUKOCYTE ESTERASE UR-ACNC: ABNORMAL
LYMPHOCYTES # BLD AUTO: 0.57 K/UL — LOW (ref 1–3.3)
LYMPHOCYTES # BLD AUTO: 3 % — LOW (ref 13–44)
MAGNESIUM SERPL-MCNC: 2.7 MG/DL — HIGH (ref 1.6–2.6)
MANUAL SMEAR VERIFICATION: SIGNIFICANT CHANGE UP
MCHC RBC-ENTMCNC: 29.9 PG — SIGNIFICANT CHANGE UP (ref 27–34)
MCHC RBC-ENTMCNC: 31.5 G/DL — LOW (ref 32–36)
MCV RBC AUTO: 95.2 FL — SIGNIFICANT CHANGE UP (ref 80–100)
MONOCYTES # BLD AUTO: 1.71 K/UL — HIGH (ref 0–0.9)
MONOCYTES NFR BLD AUTO: 9 % — SIGNIFICANT CHANGE UP (ref 2–14)
MRSA PCR RESULT.: SIGNIFICANT CHANGE UP
NEUTROPHILS # BLD AUTO: 16.31 K/UL — HIGH (ref 1.8–7.4)
NEUTROPHILS NFR BLD AUTO: 80 % — HIGH (ref 43–77)
NEUTS BAND # BLD: 6 % — SIGNIFICANT CHANGE UP (ref 0–8)
NITRITE UR-MCNC: POSITIVE
NRBC # BLD: 0 /100 — SIGNIFICANT CHANGE UP (ref 0–0)
NRBC # BLD: SIGNIFICANT CHANGE UP /100 WBCS (ref 0–0)
PH UR: 6.5 — SIGNIFICANT CHANGE UP (ref 5–8)
PHOSPHATE SERPL-MCNC: 2.7 MG/DL — SIGNIFICANT CHANGE UP (ref 2.5–4.5)
PLAT MORPH BLD: NORMAL — SIGNIFICANT CHANGE UP
PLATELET # BLD AUTO: 165 K/UL — SIGNIFICANT CHANGE UP (ref 150–400)
POTASSIUM SERPL-MCNC: 4.3 MMOL/L — SIGNIFICANT CHANGE UP (ref 3.5–5.3)
POTASSIUM SERPL-SCNC: 4.3 MMOL/L — SIGNIFICANT CHANGE UP (ref 3.5–5.3)
PROCALCITONIN SERPL-MCNC: 0.46 NG/ML — HIGH (ref 0.02–0.1)
PROT SERPL-MCNC: 5.8 GM/DL — LOW (ref 6–8.3)
PROT UR-MCNC: 100 MG/DL
RBC # BLD: 3.94 M/UL — LOW (ref 4.2–5.8)
RBC # FLD: 14.2 % — SIGNIFICANT CHANGE UP (ref 10.3–14.5)
RBC BLD AUTO: NORMAL — SIGNIFICANT CHANGE UP
RBC CASTS # UR COMP ASSIST: ABNORMAL /HPF (ref 0–4)
S AUREUS DNA NOSE QL NAA+PROBE: SIGNIFICANT CHANGE UP
SODIUM SERPL-SCNC: 143 MMOL/L — SIGNIFICANT CHANGE UP (ref 135–145)
SP GR SPEC: 1.01 — SIGNIFICANT CHANGE UP (ref 1.01–1.02)
UROBILINOGEN FLD QL: 1 MG/DL
VARIANT LYMPHS # BLD: 1 % — SIGNIFICANT CHANGE UP (ref 0–6)
WBC # BLD: 18.96 K/UL — HIGH (ref 3.8–10.5)
WBC # FLD AUTO: 18.96 K/UL — HIGH (ref 3.8–10.5)
WBC UR QL: ABNORMAL

## 2022-11-26 PROCEDURE — 99291 CRITICAL CARE FIRST HOUR: CPT

## 2022-11-26 PROCEDURE — 99233 SBSQ HOSP IP/OBS HIGH 50: CPT

## 2022-11-26 PROCEDURE — 71045 X-RAY EXAM CHEST 1 VIEW: CPT | Mod: 26

## 2022-11-26 RX ORDER — CEFEPIME 1 G/1
INJECTION, POWDER, FOR SOLUTION INTRAMUSCULAR; INTRAVENOUS
Refills: 0 | Status: DISCONTINUED | OUTPATIENT
Start: 2022-11-26 | End: 2022-12-06

## 2022-11-26 RX ORDER — LACOSAMIDE 50 MG/1
50 TABLET ORAL
Refills: 0 | Status: DISCONTINUED | OUTPATIENT
Start: 2022-11-26 | End: 2022-12-03

## 2022-11-26 RX ORDER — CEFEPIME 1 G/1
1000 INJECTION, POWDER, FOR SOLUTION INTRAMUSCULAR; INTRAVENOUS EVERY 8 HOURS
Refills: 0 | Status: DISCONTINUED | OUTPATIENT
Start: 2022-11-26 | End: 2022-12-06

## 2022-11-26 RX ORDER — INSULIN LISPRO 100/ML
4 VIAL (ML) SUBCUTANEOUS EVERY 6 HOURS
Refills: 0 | Status: DISCONTINUED | OUTPATIENT
Start: 2022-11-26 | End: 2022-12-04

## 2022-11-26 RX ORDER — CEFEPIME 1 G/1
1000 INJECTION, POWDER, FOR SOLUTION INTRAMUSCULAR; INTRAVENOUS ONCE
Refills: 0 | Status: COMPLETED | OUTPATIENT
Start: 2022-11-26 | End: 2022-11-26

## 2022-11-26 RX ORDER — VANCOMYCIN HCL 1 G
1250 VIAL (EA) INTRAVENOUS EVERY 12 HOURS
Refills: 0 | Status: COMPLETED | OUTPATIENT
Start: 2022-11-26 | End: 2022-11-26

## 2022-11-26 RX ADMIN — CHLORHEXIDINE GLUCONATE 15 MILLILITER(S): 213 SOLUTION TOPICAL at 17:26

## 2022-11-26 RX ADMIN — Medication 6: at 17:27

## 2022-11-26 RX ADMIN — LEVETIRACETAM 400 MILLIGRAM(S): 250 TABLET, FILM COATED ORAL at 06:57

## 2022-11-26 RX ADMIN — Medication 650 MILLIGRAM(S): at 06:59

## 2022-11-26 RX ADMIN — Medication 650 MILLIGRAM(S): at 17:26

## 2022-11-26 RX ADMIN — Medication 4: at 06:58

## 2022-11-26 RX ADMIN — LACOSAMIDE 120 MILLIGRAM(S): 50 TABLET ORAL at 06:59

## 2022-11-26 RX ADMIN — HEPARIN SODIUM 5000 UNIT(S): 5000 INJECTION INTRAVENOUS; SUBCUTANEOUS at 21:00

## 2022-11-26 RX ADMIN — Medication 81 MILLIGRAM(S): at 12:19

## 2022-11-26 RX ADMIN — Medication 650 MILLIGRAM(S): at 07:50

## 2022-11-26 RX ADMIN — POLYETHYLENE GLYCOL 3350 17 GRAM(S): 17 POWDER, FOR SOLUTION ORAL at 12:19

## 2022-11-26 RX ADMIN — Medication 650 MILLIGRAM(S): at 18:36

## 2022-11-26 RX ADMIN — LACOSAMIDE 110 MILLIGRAM(S): 50 TABLET ORAL at 13:16

## 2022-11-26 RX ADMIN — Medication 166.67 MILLIGRAM(S): at 13:00

## 2022-11-26 RX ADMIN — LEVETIRACETAM 400 MILLIGRAM(S): 250 TABLET, FILM COATED ORAL at 13:16

## 2022-11-26 RX ADMIN — INSULIN GLARGINE 20 UNIT(S): 100 INJECTION, SOLUTION SUBCUTANEOUS at 22:59

## 2022-11-26 RX ADMIN — HEPARIN SODIUM 5000 UNIT(S): 5000 INJECTION INTRAVENOUS; SUBCUTANEOUS at 06:58

## 2022-11-26 RX ADMIN — LACOSAMIDE 110 MILLIGRAM(S): 50 TABLET ORAL at 22:59

## 2022-11-26 RX ADMIN — INSULIN GLARGINE 20 UNIT(S): 100 INJECTION, SOLUTION SUBCUTANEOUS at 09:04

## 2022-11-26 RX ADMIN — CHLORHEXIDINE GLUCONATE 15 MILLILITER(S): 213 SOLUTION TOPICAL at 06:58

## 2022-11-26 RX ADMIN — CEFEPIME 100 MILLIGRAM(S): 1 INJECTION, POWDER, FOR SOLUTION INTRAMUSCULAR; INTRAVENOUS at 21:00

## 2022-11-26 RX ADMIN — Medication 500 MILLIGRAM(S): at 21:01

## 2022-11-26 RX ADMIN — HEPARIN SODIUM 5000 UNIT(S): 5000 INJECTION INTRAVENOUS; SUBCUTANEOUS at 13:17

## 2022-11-26 RX ADMIN — Medication 6: at 13:00

## 2022-11-26 RX ADMIN — Medication 4 UNIT(S): at 23:01

## 2022-11-26 RX ADMIN — SENNA PLUS 2 TABLET(S): 8.6 TABLET ORAL at 21:07

## 2022-11-26 RX ADMIN — Medication 4 UNIT(S): at 17:27

## 2022-11-26 RX ADMIN — CEFEPIME 100 MILLIGRAM(S): 1 INJECTION, POWDER, FOR SOLUTION INTRAMUSCULAR; INTRAVENOUS at 12:19

## 2022-11-26 RX ADMIN — LEVETIRACETAM 400 MILLIGRAM(S): 250 TABLET, FILM COATED ORAL at 21:00

## 2022-11-26 RX ADMIN — Medication 500 MILLIGRAM(S): at 13:16

## 2022-11-26 RX ADMIN — CHLORHEXIDINE GLUCONATE 1 APPLICATION(S): 213 SOLUTION TOPICAL at 13:01

## 2022-11-26 RX ADMIN — Medication 500 MILLIGRAM(S): at 07:04

## 2022-11-26 RX ADMIN — Medication 6: at 23:00

## 2022-11-26 NOTE — PROGRESS NOTE ADULT - SUBJECTIVE AND OBJECTIVE BOX
24 hr events:  febrile overnight 102  leukocytosis  hypernatremia improving  opens eyes but not following commands  failed weaning    ROS  [x] unable due to intubation    MEDICATIONS  (STANDING):  aspirin  chewable 81 milliGRAM(s) Oral daily  chlorhexidine 0.12% Liquid 15 milliLiter(s) Oral Mucosa every 12 hours  chlorhexidine 2% Cloths 1 Application(s) Topical <User Schedule>  heparin   Injectable 5000 Unit(s) SubCutaneous every 8 hours  insulin glargine Injectable (LANTUS) 20 Unit(s) SubCutaneous every morning  insulin glargine Injectable (LANTUS) 20 Unit(s) SubCutaneous at bedtime  insulin lispro (ADMELOG) corrective regimen sliding scale   SubCutaneous every 6 hours  lacosamide IVPB 50 milliGRAM(s) IV Intermittent <User Schedule>  levETIRAcetam  IVPB 1000 milliGRAM(s) IV Intermittent <User Schedule>  polyethylene glycol 3350 17 Gram(s) Oral daily  senna 2 Tablet(s) Oral at bedtime  valproic  acid Syrup 500 milliGRAM(s) Enteral Tube every 8 hours    MEDICATIONS  (PRN):  acetaminophen     Tablet .. 650 milliGRAM(s) Oral every 6 hours PRN Temp greater or equal to 38C (100.4F), Mild Pain (1 - 3)      ##LABS                         11.8   18.96 )-----------( 165      ( 26 Nov 2022 05:00 )             37.5     11-26    143  |  110<H>  |  46<H>  ----------------------------<  215<H>  4.3   |  29  |  1.05    Ca    8.6      26 Nov 2022 05:00  Phos  2.7     11-26  Mg     2.7     11-26    TPro  5.8<L>  /  Alb  1.7<L>  /  TBili  0.3  /  DBili  x   /  AST  37  /  ALT  39  /  AlkPhos  106  11-26      ##VITALS  Vital Signs Last 24 Hrs  T(C): 37.8 (26 Nov 2022 08:00), Max: 38.9 (25 Nov 2022 22:00)  T(F): 100.1 (26 Nov 2022 08:00), Max: 102.1 (25 Nov 2022 23:57)  HR: 83 (26 Nov 2022 09:00) (76 - 98)  BP: 102/83 (26 Nov 2022 09:00) (86/60 - 130/77)  BP(mean): 87 (26 Nov 2022 09:00) (65 - 90)  RR: 18 (26 Nov 2022 09:00) (16 - 32)  SpO2: 100% (26 Nov 2022 09:00) (96% - 100%)    ##EXAM  GEN: NAD, lethargic but opening eyes, orally intubated on mechanical vent  HEENT: NC/AT, ETT in place, NGT in place  CV: RRR  PULM: mechanical breath sounds, no wheeze  ABD: soft, ND, +BS  EXT: no cyanosis, no edema  SKIN: no rashes

## 2022-11-26 NOTE — PROGRESS NOTE ADULT - ASSESSMENT
72M PMH COPD, CAD s/p PCI with stent 2015 and CABG 2014, chronic diastolic heart failure (EF 50%), 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 Hypoglycemic to the 40s. On arrival to ER pt hypothermic and agonally breathing with loss of pulse; ACLS initiated. PEA arrest with ROSC after approximately 5 min s/p Epi x2.  Post arrest noted to have generalized tonic-clonic Sz confirmed on EEG monitoring. Started on antiepileptics. s/p empiric course of zosyn. Now with fevers and leukocytosis. Failure to wean    DX: cardiac arrest, respiratory arrest / acute respiratory failure requiring intubation, new onset seizure, hypoglycemia, ELMER on CKD with ATN, shock liver, takotsubo cardiomyopathy    NEURO  no further seizures   remains on valproic acid, keppra, vimpat  will decrease vimpat dose to 50mg  cont to taper down AEDs per neuro  neuro follow up      CV  out of shock state  off levophed  BP stable    PULM  daily wean as tolerates  continues to fail wean: tachypneic, shallow breaths  awaiting trach with surgery    RENAL  ELMER improved  hypernatremia improved on free water  decrease frequency of free water to 300cc q8 hours    GI  NGT feeds  awaiting PEG with trach    ID  febrile  follow up blood cx sent yesterday  check urine cx  check sputum cx  s/p course of zosyn a week ago  start cefepime      GEN  multiple GOC with son  remains full code  DVT ppx: heparin sc

## 2022-11-26 NOTE — PROGRESS NOTE ADULT - SUBJECTIVE AND OBJECTIVE BOX
HPI:  Resting comfortably, no clinical seizures reported    Vital Signs Last 24 Hrs  T(C): 37.8 (26 Nov 2022 08:00), Max: 38.9 (25 Nov 2022 22:00)  T(F): 100.1 (26 Nov 2022 08:00), Max: 102.1 (25 Nov 2022 23:57)  HR: 83 (26 Nov 2022 09:00) (76 - 98)  BP: 102/83 (26 Nov 2022 09:00) (86/60 - 130/77)  BP(mean): 87 (26 Nov 2022 09:00) (65 - 90)  RR: 18 (26 Nov 2022 09:00) (16 - 32)  SpO2: 100% (26 Nov 2022 09:00) (96% - 100%)    Parameters below as of 25 Nov 2022 12:00  Patient On (Oxygen Delivery Method): ventilator    O2 Concentration (%): 30    MEDICATIONS  (STANDING):  aspirin  chewable 81 milliGRAM(s) Oral daily  chlorhexidine 0.12% Liquid 15 milliLiter(s) Oral Mucosa every 12 hours  chlorhexidine 2% Cloths 1 Application(s) Topical <User Schedule>  heparin   Injectable 5000 Unit(s) SubCutaneous every 8 hours  insulin glargine Injectable (LANTUS) 20 Unit(s) SubCutaneous every morning  insulin glargine Injectable (LANTUS) 20 Unit(s) SubCutaneous at bedtime  insulin lispro (ADMELOG) corrective regimen sliding scale   SubCutaneous every 6 hours  lacosamide IVPB 50 milliGRAM(s) IV Intermittent <User Schedule>  levETIRAcetam  IVPB 1000 milliGRAM(s) IV Intermittent <User Schedule>  polyethylene glycol 3350 17 Gram(s) Oral daily  senna 2 Tablet(s) Oral at bedtime  valproic  acid Syrup 500 milliGRAM(s) Enteral Tube every 8 hours    MEDICATIONS  (PRN):  acetaminophen     Tablet .. 650 milliGRAM(s) Oral every 6 hours PRN Temp greater or equal to 38C (100.4F), Mild Pain (1 - 3)      ROS: pertinent positives in HPI, all other ROS were reviewed and are negative         Neurological exam:  HF: Stuporous, Opens eyes, appears to blink to threat on the right side, squeezes examiners hand with right and left hand, coughs against the ventilator, breathes over the ventilator  CN: Pupils miotic, sluggishly reactive to light, gaze midline  Motor: No spontaneous movement or withdrawal from painful stimuli  Sens: movement of left toes with stimulation of sole of foot  Reflexes: depressed throughout          LABS:                         11.8   18.96 )-----------( 165      ( 26 Nov 2022 05:00 )             37.5     11-26    143  |  110<H>  |  46<H>  ----------------------------<  215<H>  4.3   |  29  |  1.05    Ca    8.6      26 Nov 2022 05:00  Phos  2.7     11-26  Mg     2.7     11-26    TPro  5.8<L>  /  Alb  1.7<L>  /  TBili  0.3  /  DBili  x   /  AST  37  /  ALT  39  /  AlkPhos  106  11-26    LIVER FUNCTIONS - ( 26 Nov 2022 05:00 )  Alb: 1.7 g/dL / Pro: 5.8 gm/dL / ALK PHOS: 106 U/L / ALT: 39 U/L / AST: 37 U/L / GGT: x             A/P:   73 yo man with cardiac arrest preceded by severe hypoglycemia.  Exam suggestive of anoxic brain injury.    Brainstem reflexes intact, and improvement in clinical exam, but remains stuporous with intermittent myoclonus.    Plan:  1. Monitor off sedation if possible  2. Can attempt to taper off Vimpat and see if fewer AEDs helps improve wakefulness, reduce Vimpat to 50mg IV q8h x 1 day, if no clinical seizures can then discontinue.  3. Continue Keppra 1000mg IV q8h  4. Continue Depakote syrup 500mg PO q8h.  Check valproic acid level in AM  5. Supportive care    Mark Loo MD  Neurology Attending Physician

## 2022-11-27 LAB
A FLAVUS AB FLD QL: NEGATIVE — SIGNIFICANT CHANGE UP
A NIGER AB FLD QL: NEGATIVE — SIGNIFICANT CHANGE UP
A NIGER AB FLD QL: NEGATIVE — SIGNIFICANT CHANGE UP
ALBUMIN SERPL ELPH-MCNC: 1.6 G/DL — LOW (ref 3.3–5)
ALP SERPL-CCNC: 120 U/L — SIGNIFICANT CHANGE UP (ref 40–120)
ALT FLD-CCNC: 36 U/L — SIGNIFICANT CHANGE UP (ref 12–78)
ANION GAP SERPL CALC-SCNC: 3 MMOL/L — LOW (ref 5–17)
AST SERPL-CCNC: 44 U/L — HIGH (ref 15–37)
BASOPHILS # BLD AUTO: 0.08 K/UL — SIGNIFICANT CHANGE UP (ref 0–0.2)
BASOPHILS NFR BLD AUTO: 0.5 % — SIGNIFICANT CHANGE UP (ref 0–2)
BILIRUB SERPL-MCNC: 0.4 MG/DL — SIGNIFICANT CHANGE UP (ref 0.2–1.2)
BUN SERPL-MCNC: 48 MG/DL — HIGH (ref 7–23)
CALCIUM SERPL-MCNC: 8.4 MG/DL — LOW (ref 8.5–10.1)
CHLORIDE SERPL-SCNC: 109 MMOL/L — HIGH (ref 96–108)
CO2 SERPL-SCNC: 31 MMOL/L — SIGNIFICANT CHANGE UP (ref 22–31)
CREAT SERPL-MCNC: 1.08 MG/DL — SIGNIFICANT CHANGE UP (ref 0.5–1.3)
CULTURE RESULTS: SIGNIFICANT CHANGE UP
EGFR: 72 ML/MIN/1.73M2 — SIGNIFICANT CHANGE UP
EOSINOPHIL # BLD AUTO: 0.14 K/UL — SIGNIFICANT CHANGE UP (ref 0–0.5)
EOSINOPHIL NFR BLD AUTO: 0.9 % — SIGNIFICANT CHANGE UP (ref 0–6)
GLUCOSE BLDC GLUCOMTR-MCNC: 213 MG/DL — HIGH (ref 70–99)
GLUCOSE BLDC GLUCOMTR-MCNC: 231 MG/DL — HIGH (ref 70–99)
GLUCOSE BLDC GLUCOMTR-MCNC: 282 MG/DL — HIGH (ref 70–99)
GLUCOSE BLDC GLUCOMTR-MCNC: 284 MG/DL — HIGH (ref 70–99)
GLUCOSE SERPL-MCNC: 251 MG/DL — HIGH (ref 70–99)
GRAM STN FLD: SIGNIFICANT CHANGE UP
HCT VFR BLD CALC: 35.6 % — LOW (ref 39–50)
HGB BLD-MCNC: 11.3 G/DL — LOW (ref 13–17)
IMM GRANULOCYTES NFR BLD AUTO: 2.8 % — HIGH (ref 0–0.9)
LYMPHOCYTES # BLD AUTO: 1.05 K/UL — SIGNIFICANT CHANGE UP (ref 1–3.3)
LYMPHOCYTES # BLD AUTO: 6.6 % — LOW (ref 13–44)
MAGNESIUM SERPL-MCNC: 3 MG/DL — HIGH (ref 1.6–2.6)
MCHC RBC-ENTMCNC: 30.4 PG — SIGNIFICANT CHANGE UP (ref 27–34)
MCHC RBC-ENTMCNC: 31.7 G/DL — LOW (ref 32–36)
MCV RBC AUTO: 95.7 FL — SIGNIFICANT CHANGE UP (ref 80–100)
MONOCYTES # BLD AUTO: 1.35 K/UL — HIGH (ref 0–0.9)
MONOCYTES NFR BLD AUTO: 8.5 % — SIGNIFICANT CHANGE UP (ref 2–14)
NEUTROPHILS # BLD AUTO: 12.79 K/UL — HIGH (ref 1.8–7.4)
NEUTROPHILS NFR BLD AUTO: 80.7 % — HIGH (ref 43–77)
NRBC # BLD: 0 /100 WBCS — SIGNIFICANT CHANGE UP (ref 0–0)
PHOSPHATE SERPL-MCNC: 2.7 MG/DL — SIGNIFICANT CHANGE UP (ref 2.5–4.5)
PLATELET # BLD AUTO: 244 K/UL — SIGNIFICANT CHANGE UP (ref 150–400)
POTASSIUM SERPL-MCNC: 4.8 MMOL/L — SIGNIFICANT CHANGE UP (ref 3.5–5.3)
POTASSIUM SERPL-SCNC: 4.8 MMOL/L — SIGNIFICANT CHANGE UP (ref 3.5–5.3)
PROT SERPL-MCNC: 6 GM/DL — SIGNIFICANT CHANGE UP (ref 6–8.3)
RBC # BLD: 3.72 M/UL — LOW (ref 4.2–5.8)
RBC # FLD: 14.4 % — SIGNIFICANT CHANGE UP (ref 10.3–14.5)
SODIUM SERPL-SCNC: 143 MMOL/L — SIGNIFICANT CHANGE UP (ref 135–145)
SPECIMEN SOURCE: SIGNIFICANT CHANGE UP
WBC # BLD: 15.85 K/UL — HIGH (ref 3.8–10.5)
WBC # FLD AUTO: 15.85 K/UL — HIGH (ref 3.8–10.5)

## 2022-11-27 PROCEDURE — 99291 CRITICAL CARE FIRST HOUR: CPT

## 2022-11-27 RX ORDER — FUROSEMIDE 40 MG
40 TABLET ORAL ONCE
Refills: 0 | Status: COMPLETED | OUTPATIENT
Start: 2022-11-27 | End: 2022-11-27

## 2022-11-27 RX ADMIN — Medication 500 MILLIGRAM(S): at 13:55

## 2022-11-27 RX ADMIN — HEPARIN SODIUM 5000 UNIT(S): 5000 INJECTION INTRAVENOUS; SUBCUTANEOUS at 13:55

## 2022-11-27 RX ADMIN — CEFEPIME 100 MILLIGRAM(S): 1 INJECTION, POWDER, FOR SOLUTION INTRAMUSCULAR; INTRAVENOUS at 21:08

## 2022-11-27 RX ADMIN — Medication 4: at 17:04

## 2022-11-27 RX ADMIN — Medication 4 UNIT(S): at 23:28

## 2022-11-27 RX ADMIN — LACOSAMIDE 110 MILLIGRAM(S): 50 TABLET ORAL at 13:55

## 2022-11-27 RX ADMIN — HEPARIN SODIUM 5000 UNIT(S): 5000 INJECTION INTRAVENOUS; SUBCUTANEOUS at 21:08

## 2022-11-27 RX ADMIN — Medication 81 MILLIGRAM(S): at 11:33

## 2022-11-27 RX ADMIN — CHLORHEXIDINE GLUCONATE 15 MILLILITER(S): 213 SOLUTION TOPICAL at 17:05

## 2022-11-27 RX ADMIN — CEFEPIME 100 MILLIGRAM(S): 1 INJECTION, POWDER, FOR SOLUTION INTRAMUSCULAR; INTRAVENOUS at 13:56

## 2022-11-27 RX ADMIN — Medication 650 MILLIGRAM(S): at 04:38

## 2022-11-27 RX ADMIN — CHLORHEXIDINE GLUCONATE 1 APPLICATION(S): 213 SOLUTION TOPICAL at 04:10

## 2022-11-27 RX ADMIN — HEPARIN SODIUM 5000 UNIT(S): 5000 INJECTION INTRAVENOUS; SUBCUTANEOUS at 05:13

## 2022-11-27 RX ADMIN — Medication 6: at 23:27

## 2022-11-27 RX ADMIN — INSULIN GLARGINE 20 UNIT(S): 100 INJECTION, SOLUTION SUBCUTANEOUS at 08:51

## 2022-11-27 RX ADMIN — Medication 4 UNIT(S): at 17:05

## 2022-11-27 RX ADMIN — Medication 6: at 05:22

## 2022-11-27 RX ADMIN — LEVETIRACETAM 400 MILLIGRAM(S): 250 TABLET, FILM COATED ORAL at 21:08

## 2022-11-27 RX ADMIN — Medication 4 UNIT(S): at 11:33

## 2022-11-27 RX ADMIN — INSULIN GLARGINE 20 UNIT(S): 100 INJECTION, SOLUTION SUBCUTANEOUS at 23:55

## 2022-11-27 RX ADMIN — Medication 4 UNIT(S): at 05:22

## 2022-11-27 RX ADMIN — Medication 500 MILLIGRAM(S): at 21:27

## 2022-11-27 RX ADMIN — CHLORHEXIDINE GLUCONATE 15 MILLILITER(S): 213 SOLUTION TOPICAL at 06:21

## 2022-11-27 RX ADMIN — LEVETIRACETAM 400 MILLIGRAM(S): 250 TABLET, FILM COATED ORAL at 05:14

## 2022-11-27 RX ADMIN — POLYETHYLENE GLYCOL 3350 17 GRAM(S): 17 POWDER, FOR SOLUTION ORAL at 11:32

## 2022-11-27 RX ADMIN — LACOSAMIDE 110 MILLIGRAM(S): 50 TABLET ORAL at 06:21

## 2022-11-27 RX ADMIN — Medication 4: at 11:33

## 2022-11-27 RX ADMIN — LEVETIRACETAM 400 MILLIGRAM(S): 250 TABLET, FILM COATED ORAL at 13:55

## 2022-11-27 RX ADMIN — Medication 40 MILLIGRAM(S): at 12:48

## 2022-11-27 RX ADMIN — CEFEPIME 100 MILLIGRAM(S): 1 INJECTION, POWDER, FOR SOLUTION INTRAMUSCULAR; INTRAVENOUS at 05:14

## 2022-11-27 RX ADMIN — LACOSAMIDE 110 MILLIGRAM(S): 50 TABLET ORAL at 21:27

## 2022-11-27 RX ADMIN — Medication 500 MILLIGRAM(S): at 05:14

## 2022-11-27 NOTE — PROGRESS NOTE ADULT - SUBJECTIVE AND OBJECTIVE BOX
24 hr events:  more awake  attempting to follow commands  vimpat dose decreased yesterday  no clinical seizures noted  febrile 101.8  cefepime started yesterday  cultures sent  failed wean today again: abdominal breathing, tachypneic, tolerated 20 min of SBT    ROS  [x] unable to obtain due to intubation    MEDICATIONS  (STANDING):  aspirin  chewable 81 milliGRAM(s) Oral daily  cefepime   IVPB      cefepime   IVPB 1000 milliGRAM(s) IV Intermittent every 8 hours  chlorhexidine 0.12% Liquid 15 milliLiter(s) Oral Mucosa every 12 hours  chlorhexidine 2% Cloths 1 Application(s) Topical <User Schedule>  dextrose 50% Injectable 25 milliLiter(s) IV Push once  heparin   Injectable 5000 Unit(s) SubCutaneous every 8 hours  insulin glargine Injectable (LANTUS) 20 Unit(s) SubCutaneous every morning  insulin glargine Injectable (LANTUS) 20 Unit(s) SubCutaneous at bedtime  insulin lispro (ADMELOG) corrective regimen sliding scale   SubCutaneous every 6 hours  insulin lispro Injectable (ADMELOG) 4 Unit(s) SubCutaneous every 6 hours  lacosamide IVPB 50 milliGRAM(s) IV Intermittent <User Schedule>  levETIRAcetam  IVPB 1000 milliGRAM(s) IV Intermittent <User Schedule>  polyethylene glycol 3350 17 Gram(s) Oral daily  senna 2 Tablet(s) Oral at bedtime  valproic  acid Syrup 500 milliGRAM(s) Enteral Tube every 8 hours    MEDICATIONS  (PRN):  acetaminophen     Tablet .. 650 milliGRAM(s) Oral every 6 hours PRN Temp greater or equal to 38C (100.4F), Mild Pain (1 - 3)    ##LABS  Complete Blood Count + Automated Diff (11.27.22 @ 04:10)    WBC Count: 15.85 K/uL    RBC Count: 3.72 M/uL    Hemoglobin: 11.3 g/dL    Hematocrit: 35.6 %    Mean Cell Volume: 95.7 fl    Mean Cell Hemoglobin: 30.4 pg    Mean Cell Hemoglobin Conc: 31.7 g/dL    Red Cell Distrib Width: 14.4 %    Platelet Count - Automated: 244: History Verified. CALLED JULIUS NOWAK 11-27 5:37A K/uL    Nucleated RBC: 0 /100 WBCs    Comprehensive Metabolic Panel (11.27.22 @ 04:10)    Sodium, Serum: 143 mmol/L    Potassium, Serum: 4.8: Specimen slightly hemolyzed mmol/L    Chloride, Serum: 109 mmol/L    Carbon Dioxide, Serum: 31 mmol/L    Anion Gap, Serum: 3 mmol/L    Blood Urea Nitrogen, Serum: 48 mg/dL    Creatinine, Serum: 1.08 mg/dL    Glucose, Serum: 251 mg/dL    Calcium, Total Serum: 8.4 mg/dL    Protein Total, Serum: 6.0 gm/dL    Albumin, Serum: 1.6 g/dL    Bilirubin Total, Serum: 0.4 mg/dL    Alkaline Phosphatase, Serum: 120 U/L    Aspartate Aminotransferase (AST/SGOT): 44 U/L    Alanine Aminotransferase (ALT/SGPT): 36 U/L      Culture - Sputum . (11.25.22 @ 11:52)    Specimen Source: ET Tube ET Tube    Culture Results: Normal Respiratory Larissa present    Culture - Blood (11.25.22 @ 07:40)    Specimen Source: .Blood Blood-Peripheral    Culture Results: No growth to date.    ##IMAGING  CXR < from: Xray Chest 1 View- PORTABLE-Routine (Xray Chest 1 View- PORTABLE-Routine in AM.) (11.26.22 @ 08:32) >  No focal infiltrate. Small left effusion    ##EXAM  GEN: NAD, orally intubated  HEENT: NC/AT, sclera white, ETT in place, NGT in place  CV: RRR  PULM: mechanical breath sounds, no wheeze  ABD: soft, ND, ND, + BS  EXT: bilateral hand edema, no cyanosis, no edema of LE  NEURO: awake, follows simple commands, squeezing hands, wiggles toes, lifts R arm up off bed, attempts to left L arm off bed

## 2022-11-27 NOTE — PROGRESS NOTE ADULT - ASSESSMENT
72M PMH COPD, CAD s/p PCI with stent 2015 and CABG 2014, chronic diastolic heart failure (EF 50%), 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 Hypoglycemic to the 40s. On arrival to ER pt hypothermic and agonally breathing with loss of pulse; ACLS initiated. PEA arrest with ROSC after approximately 5 min s/p Epi x2.  Post arrest noted to have generalized tonic-clonic Sz confirmed on EEG monitoring. Started on antiepileptics. s/p empiric course of zosyn. Now with fevers and leukocytosis. Failure to wean    DX: cardiac arrest, respiratory arrest / acute respiratory failure requiring intubation, new onset seizure, hypoglycemia, ELMER on CKD with ATN, shock liver, takotsubo cardiomyopathy    NEURO  mental status improving  more awake  following simple commans  remains on valproic acid, keppra, vimpat  vimpat dose decreased to 50mg yesterday  no clinical evidence of seizure on lowered vimpat dose  cont to taper down AEDs per neuro  neuro follow up      CV  out of shock state  off levophed  BP stable    PULM  failing wean  awaiting trach with surgery    RENAL  ELMER improved  hypernatremia improved on free water    GI  NGT feeds  awaiting PEG with trach    ID  febrile  blood cx 11/25 negative to date  follow up urine cx sent yesterday  sputum cx with normal respiratory neal  pt had completed a course of zosyn a week ago  started on cefepime with high fevers    ENDO  hypoglycemia resolved  now on lantus and sliding scale coverage  around the clock admelog added yesterday as blood sugar rising to upper 200s  cont to monitor blood glucose    GEN  remains full code, multiple GOC with son  DVT ppx; heparin sc

## 2022-11-28 LAB
ALBUMIN SERPL ELPH-MCNC: 1.6 G/DL — LOW (ref 3.3–5)
ALP SERPL-CCNC: 137 U/L — HIGH (ref 40–120)
ALT FLD-CCNC: 59 U/L — SIGNIFICANT CHANGE UP (ref 12–78)
ANION GAP SERPL CALC-SCNC: 6 MMOL/L — SIGNIFICANT CHANGE UP (ref 5–17)
APTT BLD: 27.5 SEC — SIGNIFICANT CHANGE UP (ref 27.5–35.5)
AST SERPL-CCNC: 60 U/L — HIGH (ref 15–37)
BILIRUB SERPL-MCNC: 0.3 MG/DL — SIGNIFICANT CHANGE UP (ref 0.2–1.2)
BUN SERPL-MCNC: 50 MG/DL — HIGH (ref 7–23)
CALCIUM SERPL-MCNC: 8.7 MG/DL — SIGNIFICANT CHANGE UP (ref 8.5–10.1)
CHLORIDE SERPL-SCNC: 108 MMOL/L — SIGNIFICANT CHANGE UP (ref 96–108)
CO2 SERPL-SCNC: 31 MMOL/L — SIGNIFICANT CHANGE UP (ref 22–31)
CREAT SERPL-MCNC: 1.06 MG/DL — SIGNIFICANT CHANGE UP (ref 0.5–1.3)
EGFR: 74 ML/MIN/1.73M2 — SIGNIFICANT CHANGE UP
GALACTOMANNAN AG SERPL-ACNC: 0.08 INDEX — SIGNIFICANT CHANGE UP (ref 0–0.49)
GLUCOSE BLDC GLUCOMTR-MCNC: 116 MG/DL — HIGH (ref 70–99)
GLUCOSE BLDC GLUCOMTR-MCNC: 128 MG/DL — HIGH (ref 70–99)
GLUCOSE BLDC GLUCOMTR-MCNC: 153 MG/DL — HIGH (ref 70–99)
GLUCOSE BLDC GLUCOMTR-MCNC: 193 MG/DL — HIGH (ref 70–99)
GLUCOSE BLDC GLUCOMTR-MCNC: 208 MG/DL — HIGH (ref 70–99)
GLUCOSE BLDC GLUCOMTR-MCNC: 208 MG/DL — HIGH (ref 70–99)
GLUCOSE BLDC GLUCOMTR-MCNC: 77 MG/DL — SIGNIFICANT CHANGE UP (ref 70–99)
GLUCOSE SERPL-MCNC: 184 MG/DL — HIGH (ref 70–99)
HCT VFR BLD CALC: 36.8 % — LOW (ref 39–50)
HGB BLD-MCNC: 11.5 G/DL — LOW (ref 13–17)
INR BLD: 0.99 RATIO — SIGNIFICANT CHANGE UP (ref 0.88–1.16)
MAGNESIUM SERPL-MCNC: 2.8 MG/DL — HIGH (ref 1.6–2.6)
MCHC RBC-ENTMCNC: 30.2 PG — SIGNIFICANT CHANGE UP (ref 27–34)
MCHC RBC-ENTMCNC: 31.3 G/DL — LOW (ref 32–36)
MCV RBC AUTO: 96.6 FL — SIGNIFICANT CHANGE UP (ref 80–100)
NRBC # BLD: 0 /100 WBCS — SIGNIFICANT CHANGE UP (ref 0–0)
PHOSPHATE SERPL-MCNC: 2.5 MG/DL — SIGNIFICANT CHANGE UP (ref 2.5–4.5)
PLATELET # BLD AUTO: 291 K/UL — SIGNIFICANT CHANGE UP (ref 150–400)
POTASSIUM SERPL-MCNC: 3.9 MMOL/L — SIGNIFICANT CHANGE UP (ref 3.5–5.3)
POTASSIUM SERPL-SCNC: 3.9 MMOL/L — SIGNIFICANT CHANGE UP (ref 3.5–5.3)
PROT SERPL-MCNC: 5.9 GM/DL — LOW (ref 6–8.3)
PROTHROM AB SERPL-ACNC: 11.9 SEC — SIGNIFICANT CHANGE UP (ref 10.5–13.4)
RBC # BLD: 3.81 M/UL — LOW (ref 4.2–5.8)
RBC # FLD: 14.4 % — SIGNIFICANT CHANGE UP (ref 10.3–14.5)
SODIUM SERPL-SCNC: 145 MMOL/L — SIGNIFICANT CHANGE UP (ref 135–145)
WBC # BLD: 10.17 K/UL — SIGNIFICANT CHANGE UP (ref 3.8–10.5)
WBC # FLD AUTO: 10.17 K/UL — SIGNIFICANT CHANGE UP (ref 3.8–10.5)

## 2022-11-28 PROCEDURE — 99291 CRITICAL CARE FIRST HOUR: CPT

## 2022-11-28 RX ORDER — INSULIN GLARGINE 100 [IU]/ML
10 INJECTION, SOLUTION SUBCUTANEOUS ONCE
Refills: 0 | Status: COMPLETED | OUTPATIENT
Start: 2022-11-28 | End: 2022-11-28

## 2022-11-28 RX ORDER — DEXTROSE 50 % IN WATER 50 %
25 SYRINGE (ML) INTRAVENOUS ONCE
Refills: 0 | Status: COMPLETED | OUTPATIENT
Start: 2022-11-28 | End: 2022-11-28

## 2022-11-28 RX ADMIN — Medication 650 MILLIGRAM(S): at 14:08

## 2022-11-28 RX ADMIN — INSULIN GLARGINE 20 UNIT(S): 100 INJECTION, SOLUTION SUBCUTANEOUS at 07:54

## 2022-11-28 RX ADMIN — CEFEPIME 100 MILLIGRAM(S): 1 INJECTION, POWDER, FOR SOLUTION INTRAMUSCULAR; INTRAVENOUS at 13:00

## 2022-11-28 RX ADMIN — LACOSAMIDE 110 MILLIGRAM(S): 50 TABLET ORAL at 05:35

## 2022-11-28 RX ADMIN — LEVETIRACETAM 400 MILLIGRAM(S): 250 TABLET, FILM COATED ORAL at 21:11

## 2022-11-28 RX ADMIN — INSULIN GLARGINE 10 UNIT(S): 100 INJECTION, SOLUTION SUBCUTANEOUS at 22:01

## 2022-11-28 RX ADMIN — Medication 25 MILLILITER(S): at 11:55

## 2022-11-28 RX ADMIN — Medication 2: at 17:29

## 2022-11-28 RX ADMIN — Medication 4: at 05:08

## 2022-11-28 RX ADMIN — HEPARIN SODIUM 5000 UNIT(S): 5000 INJECTION INTRAVENOUS; SUBCUTANEOUS at 21:10

## 2022-11-28 RX ADMIN — CHLORHEXIDINE GLUCONATE 15 MILLILITER(S): 213 SOLUTION TOPICAL at 17:30

## 2022-11-28 RX ADMIN — Medication 500 MILLIGRAM(S): at 05:02

## 2022-11-28 RX ADMIN — Medication 650 MILLIGRAM(S): at 14:16

## 2022-11-28 RX ADMIN — Medication 81 MILLIGRAM(S): at 11:07

## 2022-11-28 RX ADMIN — Medication 500 MILLIGRAM(S): at 13:29

## 2022-11-28 RX ADMIN — Medication 4 UNIT(S): at 17:29

## 2022-11-28 RX ADMIN — LEVETIRACETAM 400 MILLIGRAM(S): 250 TABLET, FILM COATED ORAL at 05:03

## 2022-11-28 RX ADMIN — Medication 500 MILLIGRAM(S): at 21:10

## 2022-11-28 RX ADMIN — Medication 650 MILLIGRAM(S): at 05:40

## 2022-11-28 RX ADMIN — HEPARIN SODIUM 5000 UNIT(S): 5000 INJECTION INTRAVENOUS; SUBCUTANEOUS at 13:28

## 2022-11-28 RX ADMIN — CHLORHEXIDINE GLUCONATE 15 MILLILITER(S): 213 SOLUTION TOPICAL at 05:02

## 2022-11-28 RX ADMIN — CEFEPIME 100 MILLIGRAM(S): 1 INJECTION, POWDER, FOR SOLUTION INTRAMUSCULAR; INTRAVENOUS at 05:03

## 2022-11-28 RX ADMIN — Medication 4 UNIT(S): at 05:08

## 2022-11-28 RX ADMIN — CEFEPIME 100 MILLIGRAM(S): 1 INJECTION, POWDER, FOR SOLUTION INTRAMUSCULAR; INTRAVENOUS at 21:11

## 2022-11-28 RX ADMIN — LEVETIRACETAM 400 MILLIGRAM(S): 250 TABLET, FILM COATED ORAL at 13:01

## 2022-11-28 RX ADMIN — LACOSAMIDE 110 MILLIGRAM(S): 50 TABLET ORAL at 13:28

## 2022-11-28 RX ADMIN — LACOSAMIDE 110 MILLIGRAM(S): 50 TABLET ORAL at 22:01

## 2022-11-28 RX ADMIN — HEPARIN SODIUM 5000 UNIT(S): 5000 INJECTION INTRAVENOUS; SUBCUTANEOUS at 05:02

## 2022-11-28 RX ADMIN — Medication 650 MILLIGRAM(S): at 05:02

## 2022-11-28 NOTE — CHART NOTE - NSCHARTNOTEFT_GEN_A_CORE
Case discussed with Dr. Perdomo.  Trach/PEG tentatively planned for Wednesday, November 30.  Medical optimization requested.   Discussed with ICU team.

## 2022-11-28 NOTE — PROGRESS NOTE ADULT - SUBJECTIVE AND OBJECTIVE BOX
HPI:  Mr. Hamilton is a 72 year old male with a PMH of lacunar infarct, CHF (EF ~40% per son), CAD s/p PCI with stent and CABGx3 (~), s/p PPM (medtronic), HTN, COPD, DMII on insulin and PO meds, BPH, and CKD (stage II?) presenting to ICU s/p cardiac arrest in ED. Per patient son he has recently been doing well and in his normal state of health, however, yesterday Mr. Hamilton started c/o low blood sugars on glucometer. Pt reportedly continued to take his home medications, including both oral antidiabetic agents and insulin (both long and short acting). Today his son found him minimally responsive hanging off his bed, checked his glucose and it read "low," so he called 911. In ED pt was noted to be hypothermic with glucose noted to be 70 just prior to PEA arrest. Pt was coded for ~5 minutes with 2 epi given with ROSC. Pt intubated at that time. is moving all extremities equally and with reactive, equal pupils. He remained hypothermic and is not requiring vasopressor support. Pending CT Scans. ROS otherwise negative per son besides hypoglycemia, with no known sick contacts. Pt accepted to ICU for further management / care.  (2022 01:33)      24 hr events: No acute events.     ## ROS:  [x ] unable to obtain    ## Vitals  ICU Vital Signs Last 24 Hrs  T(C): 37.9 (2022 07:40), Max: 38.1 (2022 05:00)  T(F): 100.2 (2022 07:40), Max: 100.6 (2022 05:00)  HR: 93 (2022 09:00) (76 - 96)  BP: 129/61 (2022 09:00) (98/57 - 138/59)  BP(mean): 77 (2022 09:00) (58 - 113)  ABP: --  ABP(mean): --  RR: 19 (2022 09:00) (14 - 26)  SpO2: 97% (2022 09:00) (96% - 100%)    O2 Parameters below as of 2022 07:17  Patient On (Oxygen Delivery Method): ventilator,450/5/30/15            ## Physical Exam:  Gen:  HEENT:  Resp:  CV:  Abd:  Ext:  Neuro:    ## Vent Data  Mode: AC/ CMV (Assist Control/ Continuous Mandatory Ventilation)  RR (machine): 14  TV (machine): 450  FiO2: 30  PEEP: 5  ITime: 1  MAP: 9  PIP: 21      ## Labs:  Chem:      145  |  108  |  50<H>  ----------------------------<  184<H>  3.9   |  31  |  1.06    Ca    8.7      2022 04:30  Phos  2.5       Mg     2.8         TPro  5.9<L>  /  Alb  1.6<L>  /  TBili  0.3  /  DBili  x   /  AST  60<H>  /  ALT  59  /  AlkPhos  137<H>      LIVER FUNCTIONS - ( 2022 04:30 )  Alb: 1.6 g/dL / Pro: 5.9 gm/dL / ALK PHOS: 137 U/L / ALT: 59 U/L / AST: 60 U/L / GGT: x           CBC:                        11.5   10.17 )-----------( 291      ( 2022 04:30 )             36.8     Coags:  PT/INR - ( 2022 04:30 )   PT: 11.9 sec;   INR: 0.99 ratio         PTT - ( 2022 04:30 )  PTT:27.5 sec    Urinalysis Basic - ( 2022 12:00 )    Color: Yellow / Appearance: Clear / S.010 / pH: x  Gluc: x / Ketone: Trace  / Bili: Negative / Urobili: 1 mg/dL   Blood: x / Protein: 100 mg/dL / Nitrite: Positive   Leuk Esterase: Trace / RBC: 3-5 /HPF / WBC 6-10   Sq Epi: x / Non Sq Epi: Occasional / Bacteria: TNTC        ## Cardiac        ## Blood Gas      #I/Os  I&O's Detail    2022 07:01  -  2022 07:00  --------------------------------------------------------  IN:    Enteral Tube Flush: 100 mL    Free Water: 300 mL    Glucerna: 1320 mL    IV PiggyBack: 400 mL  Total IN: 2120 mL    OUT:    Incontinent per Condom Catheter (mL): 1280 mL    Voided (mL): 300 mL  Total OUT: 1580 mL    Total NET: 540 mL          ## Imaging:    ## Medications:  MEDICATIONS  (STANDING):  aspirin  chewable 81 milliGRAM(s) Oral daily  cefepime   IVPB      cefepime   IVPB 1000 milliGRAM(s) IV Intermittent every 8 hours  chlorhexidine 0.12% Liquid 15 milliLiter(s) Oral Mucosa every 12 hours  chlorhexidine 2% Cloths 1 Application(s) Topical <User Schedule>  heparin   Injectable 5000 Unit(s) SubCutaneous every 8 hours  insulin glargine Injectable (LANTUS) 20 Unit(s) SubCutaneous every morning  insulin glargine Injectable (LANTUS) 20 Unit(s) SubCutaneous at bedtime  insulin lispro (ADMELOG) corrective regimen sliding scale   SubCutaneous every 6 hours  insulin lispro Injectable (ADMELOG) 4 Unit(s) SubCutaneous every 6 hours  lacosamide IVPB 50 milliGRAM(s) IV Intermittent <User Schedule>  levETIRAcetam  IVPB 1000 milliGRAM(s) IV Intermittent <User Schedule>  polyethylene glycol 3350 17 Gram(s) Oral daily  senna 2 Tablet(s) Oral at bedtime  valproic  acid Syrup 500 milliGRAM(s) Enteral Tube every 8 hours    MEDICATIONS  (PRN):  acetaminophen     Tablet .. 650 milliGRAM(s) Oral every 6 hours PRN Temp greater or equal to 38C (100.4F), Mild Pain (1 - 3)       HPI:  Mr. Hamilton is a 72 year old male with a PMH of lacunar infarct, CHF (EF ~40% per son), CAD s/p PCI with stent and CABGx3 (~), s/p PPM (medtronic), HTN, COPD, DMII on insulin and PO meds, BPH, and CKD (stage II?) presenting to ICU s/p cardiac arrest in ED. Per patient son he has recently been doing well and in his normal state of health, however, yesterday Mr. Hamilton started c/o low blood sugars on glucometer. Pt reportedly continued to take his home medications, including both oral antidiabetic agents and insulin (both long and short acting). Today his son found him minimally responsive hanging off his bed, checked his glucose and it read "low," so he called 911. In ED pt was noted to be hypothermic with glucose noted to be 70 just prior to PEA arrest. Pt was coded for ~5 minutes with 2 epi given with ROSC. Pt intubated at that time. is moving all extremities equally and with reactive, equal pupils. He remained hypothermic and is not requiring vasopressor support. Pending CT Scans. ROS otherwise negative per son besides hypoglycemia, with no known sick contacts. Pt accepted to ICU for further management / care.  (2022 01:33)      24 hr events: No acute events.     ## ROS:  [x ] unable to obtain    ## Vitals  ICU Vital Signs Last 24 Hrs  T(C): 37.9 (2022 07:40), Max: 38.1 (2022 05:00)  T(F): 100.2 (2022 07:40), Max: 100.6 (2022 05:00)  HR: 93 (2022 09:00) (76 - 96)  BP: 129/61 (2022 09:00) (98/57 - 138/59)  BP(mean): 77 (2022 09:00) (58 - 113)  ABP: --  ABP(mean): --  RR: 19 (2022 09:00) (14 - 26)  SpO2: 97% (2022 09:00) (96% - 100%)    O2 Parameters below as of 2022 07:17  Patient On (Oxygen Delivery Method): ventilator,450/5/30/15            ## Physical Exam:  Gen: Elderly male lying in bed, NAD  HEENT: NC/AT sclerae anicteric. ET tube in place  Resp: Mechanical breath sounds b/l  CV: S1, S2  Abd: Soft, + BS  Ext: WWP  Neuro: Arousable, opens eyes, follows some simple commands    ## Vent Data  Mode: AC/ CMV (Assist Control/ Continuous Mandatory Ventilation)  RR (machine): 14  TV (machine): 450  FiO2: 30  PEEP: 5  ITime: 1  MAP: 9  PIP: 21      ## Labs:  Chem:      145  |  108  |  50<H>  ----------------------------<  184<H>  3.9   |  31  |  1.06    Ca    8.7      2022 04:30  Phos  2.5       Mg     2.8         TPro  5.9<L>  /  Alb  1.6<L>  /  TBili  0.3  /  DBili  x   /  AST  60<H>  /  ALT  59  /  AlkPhos  137<H>      LIVER FUNCTIONS - ( 2022 04:30 )  Alb: 1.6 g/dL / Pro: 5.9 gm/dL / ALK PHOS: 137 U/L / ALT: 59 U/L / AST: 60 U/L / GGT: x           CBC:                        11.5   10.17 )-----------( 291      ( 2022 04:30 )             36.8     Coags:  PT/INR - ( 2022 04:30 )   PT: 11.9 sec;   INR: 0.99 ratio         PTT - ( 2022 04:30 )  PTT:27.5 sec    Urinalysis Basic - ( 2022 12:00 )    Color: Yellow / Appearance: Clear / S.010 / pH: x  Gluc: x / Ketone: Trace  / Bili: Negative / Urobili: 1 mg/dL   Blood: x / Protein: 100 mg/dL / Nitrite: Positive   Leuk Esterase: Trace / RBC: 3-5 /HPF / WBC 6-10   Sq Epi: x / Non Sq Epi: Occasional / Bacteria: TNTC        ## Cardiac        ## Blood Gas      #I/Os  I&O's Detail    2022 07:01  -  2022 07:00  --------------------------------------------------------  IN:    Enteral Tube Flush: 100 mL    Free Water: 300 mL    Glucerna: 1320 mL    IV PiggyBack: 400 mL  Total IN: 2120 mL    OUT:    Incontinent per Condom Catheter (mL): 1280 mL    Voided (mL): 300 mL  Total OUT: 1580 mL    Total NET: 540 mL          ## Imaging:    ## Medications:  MEDICATIONS  (STANDING):  aspirin  chewable 81 milliGRAM(s) Oral daily  cefepime   IVPB      cefepime   IVPB 1000 milliGRAM(s) IV Intermittent every 8 hours  chlorhexidine 0.12% Liquid 15 milliLiter(s) Oral Mucosa every 12 hours  chlorhexidine 2% Cloths 1 Application(s) Topical <User Schedule>  heparin   Injectable 5000 Unit(s) SubCutaneous every 8 hours  insulin glargine Injectable (LANTUS) 20 Unit(s) SubCutaneous every morning  insulin glargine Injectable (LANTUS) 20 Unit(s) SubCutaneous at bedtime  insulin lispro (ADMELOG) corrective regimen sliding scale   SubCutaneous every 6 hours  insulin lispro Injectable (ADMELOG) 4 Unit(s) SubCutaneous every 6 hours  lacosamide IVPB 50 milliGRAM(s) IV Intermittent <User Schedule>  levETIRAcetam  IVPB 1000 milliGRAM(s) IV Intermittent <User Schedule>  polyethylene glycol 3350 17 Gram(s) Oral daily  senna 2 Tablet(s) Oral at bedtime  valproic  acid Syrup 500 milliGRAM(s) Enteral Tube every 8 hours    MEDICATIONS  (PRN):  acetaminophen     Tablet .. 650 milliGRAM(s) Oral every 6 hours PRN Temp greater or equal to 38C (100.4F), Mild Pain (1 - 3)       HPI:  Mr. Hamilton is a 72 year old male with a PMH of lacunar infarct, CHF (EF ~40% per son), CAD s/p PCI with stent and CABGx3 (~), s/p PPM (medtronic), HTN, COPD, DMII on insulin and PO meds, BPH, and CKD (stage II?) presenting to ICU s/p cardiac arrest in ED. Per patient son he has recently been doing well and in his normal state of health, however, yesterday Mr. Hamilton started c/o low blood sugars on glucometer. Pt reportedly continued to take his home medications, including both oral antidiabetic agents and insulin (both long and short acting). Today his son found him minimally responsive hanging off his bed, checked his glucose and it read "low," so he called 911. In ED pt was noted to be hypothermic with glucose noted to be 70 just prior to PEA arrest. Pt was coded for ~5 minutes with 2 epi given with ROSC. Pt intubated at that time. is moving all extremities equally and with reactive, equal pupils. He remained hypothermic and is not requiring vasopressor support. Pending CT Scans. ROS otherwise negative per son besides hypoglycemia, with no known sick contacts. Pt accepted to ICU for further management / care.  (2022 01:33)      24 hr events: No acute events. No chest pain. Breathing ok on machine. No nausea, emesis, or diarrhea.     ## ROS:  See above. ROS otherwise negative.     ## Vitals  ICU Vital Signs Last 24 Hrs  T(C): 37.9 (2022 07:40), Max: 38.1 (2022 05:00)  T(F): 100.2 (2022 07:40), Max: 100.6 (2022 05:00)  HR: 93 (2022 09:00) (76 - 96)  BP: 129/61 (2022 09:00) (98/57 - 138/59)  BP(mean): 77 (2022 09:00) (58 - 113)  ABP: --  ABP(mean): --  RR: 19 (2022 09:00) (14 - 26)  SpO2: 97% (2022 09:00) (96% - 100%)    O2 Parameters below as of 2022 07:17  Patient On (Oxygen Delivery Method): ventilator,450/5/30/15            ## Physical Exam:  Gen: Elderly male lying in bed, NAD  HEENT: NC/AT sclerae anicteric. ET tube in place  Resp: Mechanical breath sounds b/l  CV: S1, S2  Abd: Soft, + BS  Ext: WWP  Neuro: Awake, follows commands. Nods yes/no to questions    ## Vent Data  Mode: AC/ CMV (Assist Control/ Continuous Mandatory Ventilation)  RR (machine): 14  TV (machine): 450  FiO2: 30  PEEP: 5  ITime: 1  MAP: 9  PIP: 21      ## Labs:  Chem:      145  |  108  |  50<H>  ----------------------------<  184<H>  3.9   |  31  |  1.06    Ca    8.7      2022 04:30  Phos  2.5       Mg     2.8         TPro  5.9<L>  /  Alb  1.6<L>  /  TBili  0.3  /  DBili  x   /  AST  60<H>  /  ALT  59  /  AlkPhos  137<H>      LIVER FUNCTIONS - ( 2022 04:30 )  Alb: 1.6 g/dL / Pro: 5.9 gm/dL / ALK PHOS: 137 U/L / ALT: 59 U/L / AST: 60 U/L / GGT: x           CBC:                        11.5   10.17 )-----------( 291      ( 2022 04:30 )             36.8     Coags:  PT/INR - ( 2022 04:30 )   PT: 11.9 sec;   INR: 0.99 ratio         PTT - ( 2022 04:30 )  PTT:27.5 sec    Urinalysis Basic - ( 2022 12:00 )    Color: Yellow / Appearance: Clear / S.010 / pH: x  Gluc: x / Ketone: Trace  / Bili: Negative / Urobili: 1 mg/dL   Blood: x / Protein: 100 mg/dL / Nitrite: Positive   Leuk Esterase: Trace / RBC: 3-5 /HPF / WBC 6-10   Sq Epi: x / Non Sq Epi: Occasional / Bacteria: TNTC        ## Cardiac        ## Blood Gas      #I/Os  I&O's Detail    2022 07:01  -  2022 07:00  --------------------------------------------------------  IN:    Enteral Tube Flush: 100 mL    Free Water: 300 mL    Glucerna: 1320 mL    IV PiggyBack: 400 mL  Total IN: 2120 mL    OUT:    Incontinent per Condom Catheter (mL): 1280 mL    Voided (mL): 300 mL  Total OUT: 1580 mL    Total NET: 540 mL          ## Imaging:    ## Medications:  MEDICATIONS  (STANDING):  aspirin  chewable 81 milliGRAM(s) Oral daily  cefepime   IVPB      cefepime   IVPB 1000 milliGRAM(s) IV Intermittent every 8 hours  chlorhexidine 0.12% Liquid 15 milliLiter(s) Oral Mucosa every 12 hours  chlorhexidine 2% Cloths 1 Application(s) Topical <User Schedule>  heparin   Injectable 5000 Unit(s) SubCutaneous every 8 hours  insulin glargine Injectable (LANTUS) 20 Unit(s) SubCutaneous every morning  insulin glargine Injectable (LANTUS) 20 Unit(s) SubCutaneous at bedtime  insulin lispro (ADMELOG) corrective regimen sliding scale   SubCutaneous every 6 hours  insulin lispro Injectable (ADMELOG) 4 Unit(s) SubCutaneous every 6 hours  lacosamide IVPB 50 milliGRAM(s) IV Intermittent <User Schedule>  levETIRAcetam  IVPB 1000 milliGRAM(s) IV Intermittent <User Schedule>  polyethylene glycol 3350 17 Gram(s) Oral daily  senna 2 Tablet(s) Oral at bedtime  valproic  acid Syrup 500 milliGRAM(s) Enteral Tube every 8 hours    MEDICATIONS  (PRN):  acetaminophen     Tablet .. 650 milliGRAM(s) Oral every 6 hours PRN Temp greater or equal to 38C (100.4F), Mild Pain (1 - 3)

## 2022-11-28 NOTE — PROGRESS NOTE ADULT - ASSESSMENT
73 y/o M w/CAD s/p CABG, HFpEF, COPD, CKD and prior CVA admitted following hypoglycemic episode and cardiac arrest. Acute respiratory failure with hypoxia and hypercapnia secondary to arrest. Takotsubo cardiomyopathy found on Echo. Seizures likely secondary to anoxic brain injury from cardiac arrest. ELMER likely ATN. Shock liver. E. Coli UTI.    - Wean AEDs as per neuro  - Daily SAT/SBT, awaiting trach  - Complete course of abx  - DVT prophylaxis  - Full code    I have personally provided 35 minutes of attending critical care time excluding procedures. 71 y/o M w/CAD s/p CABG, HFpEF, COPD, CKD and prior CVA admitted following hypoglycemic episode and cardiac arrest. Acute respiratory failure with hypoxia and hypercapnia secondary to arrest. Hypotension likely severe sepsis with septic shock vs post arrest. Takotsubo cardiomyopathy found on Echo. Seizures likely secondary to anoxic brain injury from cardiac arrest. ELMER likely ATN. Shock liver. E. Coli UTI.    - Wean AEDs as per neuro  - Daily SAT/SBT, awaiting trach  - Monitor off pressors  - Complete course of abx  - DVT prophylaxis  - Full code    I have personally provided 35 minutes of attending critical care time excluding procedures. 73 y/o M w/CAD s/p CABG, HFpEF, COPD, CKD and prior CVA admitted following hypoglycemic episode and cardiac arrest. Acute respiratory failure with hypoxia and hypercapnia secondary to arrest. Takotsubo cardiomyopathy found on Echo. Seizures likely secondary to anoxic brain injury from cardiac arrest. ELMER likely ATN. Shock liver. E. Coli UTI.    - Wean AEDs as per neuro  - Daily SAT/SBT, awaiting trach  - Monitor off pressors  - Complete course of abx  - DVT prophylaxis  - Full code    I have personally provided 35 minutes of attending critical care time excluding procedures.

## 2022-11-29 LAB
-  AMIKACIN: SIGNIFICANT CHANGE UP
-  AMOXICILLIN/CLAVULANIC ACID: SIGNIFICANT CHANGE UP
-  AMPICILLIN/SULBACTAM: SIGNIFICANT CHANGE UP
-  AMPICILLIN: SIGNIFICANT CHANGE UP
-  AZTREONAM: SIGNIFICANT CHANGE UP
-  CEFAZOLIN: SIGNIFICANT CHANGE UP
-  CEFEPIME: SIGNIFICANT CHANGE UP
-  CEFOXITIN: SIGNIFICANT CHANGE UP
-  CEFTRIAXONE: SIGNIFICANT CHANGE UP
-  CIPROFLOXACIN: SIGNIFICANT CHANGE UP
-  ERTAPENEM: SIGNIFICANT CHANGE UP
-  GENTAMICIN: SIGNIFICANT CHANGE UP
-  IMIPENEM: SIGNIFICANT CHANGE UP
-  LEVOFLOXACIN: SIGNIFICANT CHANGE UP
-  MEROPENEM: SIGNIFICANT CHANGE UP
-  NITROFURANTOIN: SIGNIFICANT CHANGE UP
-  PIPERACILLIN/TAZOBACTAM: SIGNIFICANT CHANGE UP
-  TOBRAMYCIN: SIGNIFICANT CHANGE UP
-  TRIMETHOPRIM/SULFAMETHOXAZOLE: SIGNIFICANT CHANGE UP
ALBUMIN SERPL ELPH-MCNC: 1.6 G/DL — LOW (ref 3.3–5)
ALP SERPL-CCNC: 118 U/L — SIGNIFICANT CHANGE UP (ref 40–120)
ALT FLD-CCNC: 63 U/L — SIGNIFICANT CHANGE UP (ref 12–78)
ANION GAP SERPL CALC-SCNC: 3 MMOL/L — LOW (ref 5–17)
AST SERPL-CCNC: 48 U/L — HIGH (ref 15–37)
BILIRUB SERPL-MCNC: 0.3 MG/DL — SIGNIFICANT CHANGE UP (ref 0.2–1.2)
BUN SERPL-MCNC: 47 MG/DL — HIGH (ref 7–23)
CALCIUM SERPL-MCNC: 8.5 MG/DL — SIGNIFICANT CHANGE UP (ref 8.5–10.1)
CHLORIDE SERPL-SCNC: 107 MMOL/L — SIGNIFICANT CHANGE UP (ref 96–108)
CO2 SERPL-SCNC: 35 MMOL/L — HIGH (ref 22–31)
CREAT SERPL-MCNC: 1.11 MG/DL — SIGNIFICANT CHANGE UP (ref 0.5–1.3)
CULTURE RESULTS: SIGNIFICANT CHANGE UP
EGFR: 70 ML/MIN/1.73M2 — SIGNIFICANT CHANGE UP
GLUCOSE BLDC GLUCOMTR-MCNC: 158 MG/DL — HIGH (ref 70–99)
GLUCOSE BLDC GLUCOMTR-MCNC: 164 MG/DL — HIGH (ref 70–99)
GLUCOSE BLDC GLUCOMTR-MCNC: 181 MG/DL — HIGH (ref 70–99)
GLUCOSE BLDC GLUCOMTR-MCNC: 211 MG/DL — HIGH (ref 70–99)
GLUCOSE SERPL-MCNC: 186 MG/DL — HIGH (ref 70–99)
HCT VFR BLD CALC: 36.1 % — LOW (ref 39–50)
HGB BLD-MCNC: 11.3 G/DL — LOW (ref 13–17)
MAGNESIUM SERPL-MCNC: 2.7 MG/DL — HIGH (ref 1.6–2.6)
MCHC RBC-ENTMCNC: 30.3 PG — SIGNIFICANT CHANGE UP (ref 27–34)
MCHC RBC-ENTMCNC: 31.3 G/DL — LOW (ref 32–36)
MCV RBC AUTO: 96.8 FL — SIGNIFICANT CHANGE UP (ref 80–100)
METHOD TYPE: SIGNIFICANT CHANGE UP
NRBC # BLD: 0 /100 WBCS — SIGNIFICANT CHANGE UP (ref 0–0)
ORGANISM # SPEC MICROSCOPIC CNT: SIGNIFICANT CHANGE UP
ORGANISM # SPEC MICROSCOPIC CNT: SIGNIFICANT CHANGE UP
PHOSPHATE SERPL-MCNC: 2.5 MG/DL — SIGNIFICANT CHANGE UP (ref 2.5–4.5)
PLATELET # BLD AUTO: 323 K/UL — SIGNIFICANT CHANGE UP (ref 150–400)
POTASSIUM SERPL-MCNC: 3.9 MMOL/L — SIGNIFICANT CHANGE UP (ref 3.5–5.3)
POTASSIUM SERPL-SCNC: 3.9 MMOL/L — SIGNIFICANT CHANGE UP (ref 3.5–5.3)
PROT SERPL-MCNC: 5.7 GM/DL — LOW (ref 6–8.3)
RBC # BLD: 3.73 M/UL — LOW (ref 4.2–5.8)
RBC # FLD: 14.3 % — SIGNIFICANT CHANGE UP (ref 10.3–14.5)
SODIUM SERPL-SCNC: 145 MMOL/L — SIGNIFICANT CHANGE UP (ref 135–145)
SPECIMEN SOURCE: SIGNIFICANT CHANGE UP
WBC # BLD: 9.72 K/UL — SIGNIFICANT CHANGE UP (ref 3.8–10.5)
WBC # FLD AUTO: 9.72 K/UL — SIGNIFICANT CHANGE UP (ref 3.8–10.5)

## 2022-11-29 PROCEDURE — 99291 CRITICAL CARE FIRST HOUR: CPT

## 2022-11-29 RX ORDER — BUMETANIDE 0.25 MG/ML
2 INJECTION INTRAMUSCULAR; INTRAVENOUS ONCE
Refills: 0 | Status: DISCONTINUED | OUTPATIENT
Start: 2022-11-29 | End: 2022-11-29

## 2022-11-29 RX ORDER — ACETAMINOPHEN 500 MG
650 TABLET ORAL EVERY 6 HOURS
Refills: 0 | Status: DISCONTINUED | OUTPATIENT
Start: 2022-11-29 | End: 2022-12-02

## 2022-11-29 RX ADMIN — Medication 500 MILLIGRAM(S): at 21:11

## 2022-11-29 RX ADMIN — HEPARIN SODIUM 5000 UNIT(S): 5000 INJECTION INTRAVENOUS; SUBCUTANEOUS at 15:14

## 2022-11-29 RX ADMIN — LACOSAMIDE 110 MILLIGRAM(S): 50 TABLET ORAL at 22:17

## 2022-11-29 RX ADMIN — Medication 650 MILLIGRAM(S): at 18:30

## 2022-11-29 RX ADMIN — Medication 4: at 12:17

## 2022-11-29 RX ADMIN — CEFEPIME 100 MILLIGRAM(S): 1 INJECTION, POWDER, FOR SOLUTION INTRAMUSCULAR; INTRAVENOUS at 05:11

## 2022-11-29 RX ADMIN — LACOSAMIDE 110 MILLIGRAM(S): 50 TABLET ORAL at 15:14

## 2022-11-29 RX ADMIN — HEPARIN SODIUM 5000 UNIT(S): 5000 INJECTION INTRAVENOUS; SUBCUTANEOUS at 05:12

## 2022-11-29 RX ADMIN — LEVETIRACETAM 400 MILLIGRAM(S): 250 TABLET, FILM COATED ORAL at 15:14

## 2022-11-29 RX ADMIN — Medication 500 MILLIGRAM(S): at 15:14

## 2022-11-29 RX ADMIN — CHLORHEXIDINE GLUCONATE 1 APPLICATION(S): 213 SOLUTION TOPICAL at 05:00

## 2022-11-29 RX ADMIN — Medication 650 MILLIGRAM(S): at 23:50

## 2022-11-29 RX ADMIN — Medication 2: at 23:51

## 2022-11-29 RX ADMIN — INSULIN GLARGINE 20 UNIT(S): 100 INJECTION, SOLUTION SUBCUTANEOUS at 09:30

## 2022-11-29 RX ADMIN — CHLORHEXIDINE GLUCONATE 15 MILLILITER(S): 213 SOLUTION TOPICAL at 05:10

## 2022-11-29 RX ADMIN — CHLORHEXIDINE GLUCONATE 15 MILLILITER(S): 213 SOLUTION TOPICAL at 17:58

## 2022-11-29 RX ADMIN — Medication 4 UNIT(S): at 05:20

## 2022-11-29 RX ADMIN — Medication 4: at 00:04

## 2022-11-29 RX ADMIN — Medication 2: at 05:21

## 2022-11-29 RX ADMIN — CEFEPIME 100 MILLIGRAM(S): 1 INJECTION, POWDER, FOR SOLUTION INTRAMUSCULAR; INTRAVENOUS at 15:14

## 2022-11-29 RX ADMIN — LACOSAMIDE 110 MILLIGRAM(S): 50 TABLET ORAL at 06:26

## 2022-11-29 RX ADMIN — Medication 4 UNIT(S): at 12:18

## 2022-11-29 RX ADMIN — Medication 500 MILLIGRAM(S): at 05:12

## 2022-11-29 RX ADMIN — Medication 4 UNIT(S): at 00:05

## 2022-11-29 RX ADMIN — Medication 650 MILLIGRAM(S): at 05:13

## 2022-11-29 RX ADMIN — POLYETHYLENE GLYCOL 3350 17 GRAM(S): 17 POWDER, FOR SOLUTION ORAL at 12:18

## 2022-11-29 RX ADMIN — LEVETIRACETAM 400 MILLIGRAM(S): 250 TABLET, FILM COATED ORAL at 06:29

## 2022-11-29 RX ADMIN — SENNA PLUS 2 TABLET(S): 8.6 TABLET ORAL at 21:10

## 2022-11-29 RX ADMIN — LEVETIRACETAM 400 MILLIGRAM(S): 250 TABLET, FILM COATED ORAL at 21:39

## 2022-11-29 RX ADMIN — Medication 2: at 17:57

## 2022-11-29 RX ADMIN — CEFEPIME 100 MILLIGRAM(S): 1 INJECTION, POWDER, FOR SOLUTION INTRAMUSCULAR; INTRAVENOUS at 21:10

## 2022-11-29 RX ADMIN — Medication 650 MILLIGRAM(S): at 17:58

## 2022-11-29 RX ADMIN — HEPARIN SODIUM 5000 UNIT(S): 5000 INJECTION INTRAVENOUS; SUBCUTANEOUS at 21:36

## 2022-11-29 RX ADMIN — Medication 650 MILLIGRAM(S): at 05:43

## 2022-11-29 RX ADMIN — Medication 81 MILLIGRAM(S): at 12:20

## 2022-11-29 RX ADMIN — Medication 4 UNIT(S): at 17:58

## 2022-11-29 NOTE — PROGRESS NOTE ADULT - SUBJECTIVE AND OBJECTIVE BOX
HPI:  Mr. Hamilton is a 72 year old male with a PMH of lacunar infarct, CHF (EF ~40% per son), CAD s/p PCI with stent and CABGx3 (~2020), s/p PPM (medtronic), HTN, COPD, DMII on insulin and PO meds, BPH, and CKD (stage II?) presenting to ICU s/p cardiac arrest in ED. Per patient son he has recently been doing well and in his normal state of health, however, yesterday Mr. Hamilton started c/o low blood sugars on glucometer. Pt reportedly continued to take his home medications, including both oral antidiabetic agents and insulin (both long and short acting). Today his son found him minimally responsive hanging off his bed, checked his glucose and it read "low," so he called 911. In ED pt was noted to be hypothermic with glucose noted to be 70 just prior to PEA arrest. Pt was coded for ~5 minutes with 2 epi given with ROSC. Pt intubated at that time. is moving all extremities equally and with reactive, equal pupils. He remained hypothermic and is not requiring vasopressor support. Pending CT Scans. ROS otherwise negative per son besides hypoglycemia, with no known sick contacts. Pt accepted to ICU for further management / care.  (14 Nov 2022 01:33)      24 hr events: No acute events. Failed SBT. No chest pain, fevers, chills, nausea, emesis, or diarrhea.     ## ROS:  See above. ROS otherwise negative.     ## Vitals  ICU Vital Signs Last 24 Hrs  T(C): 36.9 (29 Nov 2022 07:45), Max: 38.3 (29 Nov 2022 04:00)  T(F): 98.5 (29 Nov 2022 07:45), Max: 100.9 (29 Nov 2022 04:00)  HR: 90 (29 Nov 2022 07:00) (71 - 97)  BP: 111/62 (29 Nov 2022 07:00) (97/54 - 135/66)  BP(mean): 74 (29 Nov 2022 07:00) (64 - 101)  ABP: --  ABP(mean): --  RR: 25 (29 Nov 2022 07:00) (14 - 29)  SpO2: 97% (29 Nov 2022 07:00) (95% - 99%)    O2 Parameters below as of 29 Nov 2022 04:00  Patient On (Oxygen Delivery Method): ventilator    O2 Concentration (%): 35        ## Physical Exam:  Gen: Elderly male lying in bed, NAD  HEENT: NC/AT sclerae anicteric. ET tube in place  Resp: Mechanical breath sounds b/l  CV: S1, S2  Abd: Soft, + BS  Ext: WWP  Neuro: Awake, follows commands. Nods yes/no to questions    ## Vent Data  Mode: CPAP with PS  FiO2: 30  PEEP: 5  PS: 10  ITime: 1  MAP: 8  PIP: 15      ## Labs:  Chem:  11-29    145  |  107  |  47<H>  ----------------------------<  186<H>  3.9   |  35<H>  |  1.11    Ca    8.5      29 Nov 2022 02:45  Phos  2.5     11-29  Mg     2.7     11-29    TPro  5.7<L>  /  Alb  1.6<L>  /  TBili  0.3  /  DBili  x   /  AST  48<H>  /  ALT  63  /  AlkPhos  118  11-29    LIVER FUNCTIONS - ( 29 Nov 2022 02:45 )  Alb: 1.6 g/dL / Pro: 5.7 gm/dL / ALK PHOS: 118 U/L / ALT: 63 U/L / AST: 48 U/L / GGT: x           CBC:                        11.3   9.72  )-----------( 323      ( 29 Nov 2022 02:45 )             36.1     Coags:  PT/INR - ( 28 Nov 2022 04:30 )   PT: 11.9 sec;   INR: 0.99 ratio         PTT - ( 28 Nov 2022 04:30 )  PTT:27.5 sec        ## Cardiac        ## Blood Gas      #I/Os  I&O's Detail    28 Nov 2022 07:01  -  29 Nov 2022 07:00  --------------------------------------------------------  IN:    Enteral Tube Flush: 300 mL    Free Water: 600 mL    Glucerna: 1320 mL    IV PiggyBack: 200 mL  Total IN: 2420 mL    OUT:    Incontinent per Condom Catheter (mL): 1300 mL    Stool (mL): 1 mL  Total OUT: 1301 mL    Total NET: 1119 mL          ## Imaging:    ## Medications:  MEDICATIONS  (STANDING):  aspirin  chewable 81 milliGRAM(s) Oral daily  cefepime   IVPB      cefepime   IVPB 1000 milliGRAM(s) IV Intermittent every 8 hours  chlorhexidine 0.12% Liquid 15 milliLiter(s) Oral Mucosa every 12 hours  chlorhexidine 2% Cloths 1 Application(s) Topical <User Schedule>  heparin   Injectable 5000 Unit(s) SubCutaneous every 8 hours  insulin glargine Injectable (LANTUS) 20 Unit(s) SubCutaneous every morning  insulin glargine Injectable (LANTUS) 20 Unit(s) SubCutaneous at bedtime  insulin lispro (ADMELOG) corrective regimen sliding scale   SubCutaneous every 6 hours  insulin lispro Injectable (ADMELOG) 4 Unit(s) SubCutaneous every 6 hours  lacosamide IVPB 50 milliGRAM(s) IV Intermittent <User Schedule>  levETIRAcetam  IVPB 1000 milliGRAM(s) IV Intermittent <User Schedule>  polyethylene glycol 3350 17 Gram(s) Oral daily  senna 2 Tablet(s) Oral at bedtime  valproic  acid Syrup 500 milliGRAM(s) Enteral Tube every 8 hours    MEDICATIONS  (PRN):  acetaminophen     Tablet .. 650 milliGRAM(s) Oral every 6 hours PRN Temp greater or equal to 38C (100.4F), Mild Pain (1 - 3)

## 2022-11-29 NOTE — PROGRESS NOTE ADULT - ASSESSMENT
71 y/o M w/CAD s/p CABG, HFpEF, COPD, CKD and prior CVA admitted following hypoglycemic episode and cardiac arrest. Acute respiratory failure with hypoxia and hypercapnia secondary to arrest. Takotsubo cardiomyopathy found on Echo. Seizures likely secondary to anoxic brain injury from cardiac arrest. ELMER likely ATN. Shock liver. E. Coli UTI. Trach/PEG planned for Wed 11/30. Patient is medically optimized for planned tracheostomy and PEG.    - Wean AEDs as per neuro  - Daily SAT/SBT, awaiting trach/PEG  - Monitor off pressors  - Complete course of abx  - DVT prophylaxis  - Full code    I have personally provided 35 minutes of attending critical care time excluding procedures.

## 2022-11-30 LAB
ALBUMIN SERPL ELPH-MCNC: 1.7 G/DL — LOW (ref 3.3–5)
ALP SERPL-CCNC: 118 U/L — SIGNIFICANT CHANGE UP (ref 40–120)
ALT FLD-CCNC: 56 U/L — SIGNIFICANT CHANGE UP (ref 12–78)
ANION GAP SERPL CALC-SCNC: 4 MMOL/L — LOW (ref 5–17)
APTT BLD: 27.8 SEC — SIGNIFICANT CHANGE UP (ref 27.5–35.5)
AST SERPL-CCNC: 37 U/L — SIGNIFICANT CHANGE UP (ref 15–37)
BASOPHILS # BLD AUTO: 0.06 K/UL — SIGNIFICANT CHANGE UP (ref 0–0.2)
BASOPHILS NFR BLD AUTO: 0.5 % — SIGNIFICANT CHANGE UP (ref 0–2)
BILIRUB SERPL-MCNC: 0.3 MG/DL — SIGNIFICANT CHANGE UP (ref 0.2–1.2)
BLD GP AB SCN SERPL QL: SIGNIFICANT CHANGE UP
BUN SERPL-MCNC: 45 MG/DL — HIGH (ref 7–23)
CALCIUM SERPL-MCNC: 8.5 MG/DL — SIGNIFICANT CHANGE UP (ref 8.5–10.1)
CHLORIDE SERPL-SCNC: 104 MMOL/L — SIGNIFICANT CHANGE UP (ref 96–108)
CO2 SERPL-SCNC: 33 MMOL/L — HIGH (ref 22–31)
CREAT SERPL-MCNC: 0.92 MG/DL — SIGNIFICANT CHANGE UP (ref 0.5–1.3)
CULTURE RESULTS: SIGNIFICANT CHANGE UP
CULTURE RESULTS: SIGNIFICANT CHANGE UP
EGFR: 87 ML/MIN/1.73M2 — SIGNIFICANT CHANGE UP
EOSINOPHIL # BLD AUTO: 0.53 K/UL — HIGH (ref 0–0.5)
EOSINOPHIL NFR BLD AUTO: 4.5 % — SIGNIFICANT CHANGE UP (ref 0–6)
GLUCOSE BLDC GLUCOMTR-MCNC: 167 MG/DL — HIGH (ref 70–99)
GLUCOSE BLDC GLUCOMTR-MCNC: 171 MG/DL — HIGH (ref 70–99)
GLUCOSE BLDC GLUCOMTR-MCNC: 190 MG/DL — HIGH (ref 70–99)
GLUCOSE BLDC GLUCOMTR-MCNC: 228 MG/DL — HIGH (ref 70–99)
GLUCOSE BLDC GLUCOMTR-MCNC: 233 MG/DL — HIGH (ref 70–99)
GLUCOSE SERPL-MCNC: 145 MG/DL — HIGH (ref 70–99)
HCT VFR BLD CALC: 37.3 % — LOW (ref 39–50)
HGB BLD-MCNC: 11.6 G/DL — LOW (ref 13–17)
IMM GRANULOCYTES NFR BLD AUTO: 10.9 % — HIGH (ref 0–0.9)
INR BLD: 0.97 RATIO — SIGNIFICANT CHANGE UP (ref 0.88–1.16)
LYMPHOCYTES # BLD AUTO: 17.5 % — SIGNIFICANT CHANGE UP (ref 13–44)
LYMPHOCYTES # BLD AUTO: 2.07 K/UL — SIGNIFICANT CHANGE UP (ref 1–3.3)
MAGNESIUM SERPL-MCNC: 2.7 MG/DL — HIGH (ref 1.6–2.6)
MCHC RBC-ENTMCNC: 30.3 PG — SIGNIFICANT CHANGE UP (ref 27–34)
MCHC RBC-ENTMCNC: 31.1 G/DL — LOW (ref 32–36)
MCV RBC AUTO: 97.4 FL — SIGNIFICANT CHANGE UP (ref 80–100)
MONOCYTES # BLD AUTO: 1.06 K/UL — HIGH (ref 0–0.9)
MONOCYTES NFR BLD AUTO: 9 % — SIGNIFICANT CHANGE UP (ref 2–14)
NEUTROPHILS # BLD AUTO: 6.79 K/UL — SIGNIFICANT CHANGE UP (ref 1.8–7.4)
NEUTROPHILS NFR BLD AUTO: 57.6 % — SIGNIFICANT CHANGE UP (ref 43–77)
NRBC # BLD: 0 /100 WBCS — SIGNIFICANT CHANGE UP (ref 0–0)
PHOSPHATE SERPL-MCNC: 3.4 MG/DL — SIGNIFICANT CHANGE UP (ref 2.5–4.5)
PLATELET # BLD AUTO: 316 K/UL — SIGNIFICANT CHANGE UP (ref 150–400)
POTASSIUM SERPL-MCNC: 4.1 MMOL/L — SIGNIFICANT CHANGE UP (ref 3.5–5.3)
POTASSIUM SERPL-SCNC: 4.1 MMOL/L — SIGNIFICANT CHANGE UP (ref 3.5–5.3)
PROT SERPL-MCNC: 5.7 GM/DL — LOW (ref 6–8.3)
PROTHROM AB SERPL-ACNC: 11.6 SEC — SIGNIFICANT CHANGE UP (ref 10.5–13.4)
RAPID RVP RESULT: SIGNIFICANT CHANGE UP
RBC # BLD: 3.83 M/UL — LOW (ref 4.2–5.8)
RBC # FLD: 14.2 % — SIGNIFICANT CHANGE UP (ref 10.3–14.5)
SARS-COV-2 RNA SPEC QL NAA+PROBE: SIGNIFICANT CHANGE UP
SODIUM SERPL-SCNC: 141 MMOL/L — SIGNIFICANT CHANGE UP (ref 135–145)
SPECIMEN SOURCE: SIGNIFICANT CHANGE UP
SPECIMEN SOURCE: SIGNIFICANT CHANGE UP
WBC # BLD: 11.8 K/UL — HIGH (ref 3.8–10.5)
WBC # FLD AUTO: 11.8 K/UL — HIGH (ref 3.8–10.5)

## 2022-11-30 PROCEDURE — 99291 CRITICAL CARE FIRST HOUR: CPT

## 2022-11-30 RX ADMIN — CHLORHEXIDINE GLUCONATE 1 APPLICATION(S): 213 SOLUTION TOPICAL at 05:05

## 2022-11-30 RX ADMIN — Medication 2: at 12:03

## 2022-11-30 RX ADMIN — Medication 650 MILLIGRAM(S): at 23:52

## 2022-11-30 RX ADMIN — POLYETHYLENE GLYCOL 3350 17 GRAM(S): 17 POWDER, FOR SOLUTION ORAL at 12:02

## 2022-11-30 RX ADMIN — LACOSAMIDE 110 MILLIGRAM(S): 50 TABLET ORAL at 15:06

## 2022-11-30 RX ADMIN — Medication 650 MILLIGRAM(S): at 12:32

## 2022-11-30 RX ADMIN — Medication 650 MILLIGRAM(S): at 05:14

## 2022-11-30 RX ADMIN — LEVETIRACETAM 400 MILLIGRAM(S): 250 TABLET, FILM COATED ORAL at 21:45

## 2022-11-30 RX ADMIN — LEVETIRACETAM 400 MILLIGRAM(S): 250 TABLET, FILM COATED ORAL at 06:08

## 2022-11-30 RX ADMIN — Medication 4: at 23:46

## 2022-11-30 RX ADMIN — Medication 4 UNIT(S): at 18:14

## 2022-11-30 RX ADMIN — CHLORHEXIDINE GLUCONATE 15 MILLILITER(S): 213 SOLUTION TOPICAL at 18:11

## 2022-11-30 RX ADMIN — CEFEPIME 100 MILLIGRAM(S): 1 INJECTION, POWDER, FOR SOLUTION INTRAMUSCULAR; INTRAVENOUS at 15:08

## 2022-11-30 RX ADMIN — SENNA PLUS 2 TABLET(S): 8.6 TABLET ORAL at 21:12

## 2022-11-30 RX ADMIN — CEFEPIME 100 MILLIGRAM(S): 1 INJECTION, POWDER, FOR SOLUTION INTRAMUSCULAR; INTRAVENOUS at 05:06

## 2022-11-30 RX ADMIN — Medication 500 MILLIGRAM(S): at 05:06

## 2022-11-30 RX ADMIN — Medication 500 MILLIGRAM(S): at 21:12

## 2022-11-30 RX ADMIN — Medication 650 MILLIGRAM(S): at 23:44

## 2022-11-30 RX ADMIN — CEFEPIME 100 MILLIGRAM(S): 1 INJECTION, POWDER, FOR SOLUTION INTRAMUSCULAR; INTRAVENOUS at 21:11

## 2022-11-30 RX ADMIN — Medication 500 MILLIGRAM(S): at 16:09

## 2022-11-30 RX ADMIN — Medication 2: at 05:27

## 2022-11-30 RX ADMIN — LACOSAMIDE 110 MILLIGRAM(S): 50 TABLET ORAL at 05:36

## 2022-11-30 RX ADMIN — LEVETIRACETAM 400 MILLIGRAM(S): 250 TABLET, FILM COATED ORAL at 15:11

## 2022-11-30 RX ADMIN — Medication 4: at 18:13

## 2022-11-30 RX ADMIN — Medication 650 MILLIGRAM(S): at 12:02

## 2022-11-30 RX ADMIN — CHLORHEXIDINE GLUCONATE 15 MILLILITER(S): 213 SOLUTION TOPICAL at 05:05

## 2022-11-30 RX ADMIN — Medication 650 MILLIGRAM(S): at 18:11

## 2022-11-30 RX ADMIN — LACOSAMIDE 110 MILLIGRAM(S): 50 TABLET ORAL at 22:33

## 2022-11-30 RX ADMIN — Medication 650 MILLIGRAM(S): at 18:44

## 2022-11-30 RX ADMIN — HEPARIN SODIUM 5000 UNIT(S): 5000 INJECTION INTRAVENOUS; SUBCUTANEOUS at 21:12

## 2022-11-30 NOTE — PROGRESS NOTE ADULT - SUBJECTIVE AND OBJECTIVE BOX
Pre-operative Note    - Pre-operative Diagnosis: cardiac arrest, encephalopathy    - Procedure: tracheostomy/PEG      - Labs:                        11.3   9.72  )-----------( 323      ( 29 Nov 2022 02:45 )             36.1     11-29    145  |  107  |  47<H>  ----------------------------<  186<H>  3.9   |  35<H>  |  1.11    Ca    8.5      29 Nov 2022 02:45  Phos  2.5     11-29  Mg     2.7     11-29    TPro  5.7<L>  /  Alb  1.6<L>  /  TBili  0.3  /  DBili  x   /  AST  48<H>  /  ALT  63  /  AlkPhos  118  11-29    PT/INR - ( 28 Nov 2022 04:30 )   PT: 11.9 sec;   INR: 0.99 ratio      PTT - ( 28 Nov 2022 04:30 )  PTT:27.5 sec    - CXR: completed  - EKG: completed    - Orders:  > NPO at midnight, IVF  > Abx   > Labs: CBC, BMP, coags, type & screen    - Permits:  > Consent in chart  > Case scheduled with OR  > Follow up covid swab  > Patient is medically optimized for planned tracheostomy and PEG per ICU.

## 2022-11-30 NOTE — PROGRESS NOTE ADULT - SUBJECTIVE AND OBJECTIVE BOX
HPI:  Mr. Hamilton is a 72 year old male with a PMH of lacunar infarct, CHF (EF ~40% per son), CAD s/p PCI with stent and CABGx3 (~2020), s/p PPM (medtronic), HTN, COPD, DMII on insulin and PO meds, BPH, and CKD (stage II?) presenting to ICU s/p cardiac arrest in ED. Per patient son he has recently been doing well and in his normal state of health, however, yesterday Mr. Hamilton started c/o low blood sugars on glucometer. Pt reportedly continued to take his home medications, including both oral antidiabetic agents and insulin (both long and short acting). Today his son found him minimally responsive hanging off his bed, checked his glucose and it read "low," so he called 911. In ED pt was noted to be hypothermic with glucose noted to be 70 just prior to PEA arrest. Pt was coded for ~5 minutes with 2 epi given with ROSC. Pt intubated at that time. is moving all extremities equally and with reactive, equal pupils. He remained hypothermic and is not requiring vasopressor support. Pending CT Scans. ROS otherwise negative per son besides hypoglycemia, with no known sick contacts. Pt accepted to ICU for further management / care.  (14 Nov 2022 01:33)      24 hr events: No acute events. Awaiting trach/PEG    ## ROS:  [x ] unable to obtain      ## Vitals  ICU Vital Signs Last 24 Hrs  T(C): 37.2 (30 Nov 2022 07:00), Max: 37.9 (29 Nov 2022 11:00)  T(F): 98.9 (30 Nov 2022 07:00), Max: 100.2 (29 Nov 2022 11:00)  HR: 70 (30 Nov 2022 08:00) (68 - 96)  BP: 114/67 (30 Nov 2022 08:00) (88/55 - 128/72)  BP(mean): 78 (30 Nov 2022 08:00) (62 - 86)  ABP: --  ABP(mean): --  RR: 16 (30 Nov 2022 08:00) (13 - 25)  SpO2: 100% (30 Nov 2022 08:00) (96% - 100%)        ## Physical Exam:  Gen: Elderly male lying in bed, NAD  HEENT: NC/AT sclerae anicteric. ET tube in place  Resp: Mechanical breath sounds b/l  CV: S1, S2  Abd: Soft, + BS  Ext: WWP  Neuro: Awake, follows commands. Nods yes/no to questions    ## Vent Data  Mode: AC/ CMV (Assist Control/ Continuous Mandatory Ventilation)  RR (machine): 14  TV (machine): 450  FiO2: 30  PEEP: 5  ITime: 0.9  MAP: 10  PIP: 23      ## Labs:  Chem:  11-30    141  |  104  |  45<H>  ----------------------------<  145<H>  4.1   |  33<H>  |  0.92    Ca    8.5      30 Nov 2022 02:30  Phos  3.4     11-30  Mg     2.7     11-30    TPro  5.7<L>  /  Alb  1.7<L>  /  TBili  0.3  /  DBili  x   /  AST  37  /  ALT  56  /  AlkPhos  118  11-30    LIVER FUNCTIONS - ( 30 Nov 2022 02:30 )  Alb: 1.7 g/dL / Pro: 5.7 gm/dL / ALK PHOS: 118 U/L / ALT: 56 U/L / AST: 37 U/L / GGT: x           CBC:                        11.6   11.80 )-----------( 316      ( 30 Nov 2022 02:30 )             37.3     Coags:  PT/INR - ( 30 Nov 2022 02:30 )   PT: 11.6 sec;   INR: 0.97 ratio         PTT - ( 30 Nov 2022 02:30 )  PTT:27.8 sec        ## Cardiac        ## Blood Gas      #I/Os  I&O's Detail    29 Nov 2022 07:01  -  30 Nov 2022 07:00  --------------------------------------------------------  IN:    Enteral Tube Flush: 1000 mL    Glucerna: 825 mL    IV PiggyBack: 400 mL  Total IN: 2225 mL    OUT:    Incontinent per Condom Catheter (mL): 550 mL  Total OUT: 550 mL    Total NET: 1675 mL          ## Imaging:    ## Medications:  MEDICATIONS  (STANDING):  acetaminophen     Tablet .. 650 milliGRAM(s) Oral every 6 hours  aspirin  chewable 81 milliGRAM(s) Oral daily  cefepime   IVPB      cefepime   IVPB 1000 milliGRAM(s) IV Intermittent every 8 hours  chlorhexidine 0.12% Liquid 15 milliLiter(s) Oral Mucosa every 12 hours  chlorhexidine 2% Cloths 1 Application(s) Topical <User Schedule>  heparin   Injectable 5000 Unit(s) SubCutaneous every 8 hours  insulin glargine Injectable (LANTUS) 20 Unit(s) SubCutaneous every morning  insulin glargine Injectable (LANTUS) 20 Unit(s) SubCutaneous at bedtime  insulin lispro (ADMELOG) corrective regimen sliding scale   SubCutaneous every 6 hours  insulin lispro Injectable (ADMELOG) 4 Unit(s) SubCutaneous every 6 hours  lacosamide IVPB 50 milliGRAM(s) IV Intermittent <User Schedule>  levETIRAcetam  IVPB 1000 milliGRAM(s) IV Intermittent <User Schedule>  polyethylene glycol 3350 17 Gram(s) Oral daily  senna 2 Tablet(s) Oral at bedtime  valproic  acid Syrup 500 milliGRAM(s) Enteral Tube every 8 hours    MEDICATIONS  (PRN):

## 2022-12-01 LAB
ANION GAP SERPL CALC-SCNC: 4 MMOL/L — LOW (ref 5–17)
APTT BLD: 18.8 SEC — LOW (ref 27.5–35.5)
BLD GP AB SCN SERPL QL: SIGNIFICANT CHANGE UP
BUN SERPL-MCNC: 42 MG/DL — HIGH (ref 7–23)
CALCIUM SERPL-MCNC: 7.9 MG/DL — LOW (ref 8.5–10.1)
CHLORIDE SERPL-SCNC: 104 MMOL/L — SIGNIFICANT CHANGE UP (ref 96–108)
CO2 SERPL-SCNC: 32 MMOL/L — HIGH (ref 22–31)
CREAT SERPL-MCNC: 0.93 MG/DL — SIGNIFICANT CHANGE UP (ref 0.5–1.3)
EGFR: 86 ML/MIN/1.73M2 — SIGNIFICANT CHANGE UP
GLUCOSE BLDC GLUCOMTR-MCNC: 155 MG/DL — HIGH (ref 70–99)
GLUCOSE BLDC GLUCOMTR-MCNC: 160 MG/DL — HIGH (ref 70–99)
GLUCOSE BLDC GLUCOMTR-MCNC: 196 MG/DL — HIGH (ref 70–99)
GLUCOSE BLDC GLUCOMTR-MCNC: 242 MG/DL — HIGH (ref 70–99)
GLUCOSE SERPL-MCNC: 183 MG/DL — HIGH (ref 70–99)
HCT VFR BLD CALC: 33.1 % — LOW (ref 39–50)
HGB BLD-MCNC: 10.3 G/DL — LOW (ref 13–17)
INR BLD: 0.98 RATIO — SIGNIFICANT CHANGE UP (ref 0.88–1.16)
MAGNESIUM SERPL-MCNC: 2.6 MG/DL — SIGNIFICANT CHANGE UP (ref 1.6–2.6)
MCHC RBC-ENTMCNC: 30.1 PG — SIGNIFICANT CHANGE UP (ref 27–34)
MCHC RBC-ENTMCNC: 31.1 G/DL — LOW (ref 32–36)
MCV RBC AUTO: 96.8 FL — SIGNIFICANT CHANGE UP (ref 80–100)
NRBC # BLD: 0 /100 WBCS — SIGNIFICANT CHANGE UP (ref 0–0)
PHOSPHATE SERPL-MCNC: 3.2 MG/DL — SIGNIFICANT CHANGE UP (ref 2.5–4.5)
PLATELET # BLD AUTO: 327 K/UL — SIGNIFICANT CHANGE UP (ref 150–400)
POTASSIUM SERPL-MCNC: 4 MMOL/L — SIGNIFICANT CHANGE UP (ref 3.5–5.3)
POTASSIUM SERPL-SCNC: 4 MMOL/L — SIGNIFICANT CHANGE UP (ref 3.5–5.3)
PROTHROM AB SERPL-ACNC: 11.7 SEC — SIGNIFICANT CHANGE UP (ref 10.5–13.4)
RBC # BLD: 3.42 M/UL — LOW (ref 4.2–5.8)
RBC # FLD: 14.1 % — SIGNIFICANT CHANGE UP (ref 10.3–14.5)
SODIUM SERPL-SCNC: 140 MMOL/L — SIGNIFICANT CHANGE UP (ref 135–145)
WBC # BLD: 11.04 K/UL — HIGH (ref 3.8–10.5)
WBC # FLD AUTO: 11.04 K/UL — HIGH (ref 3.8–10.5)

## 2022-12-01 PROCEDURE — 99291 CRITICAL CARE FIRST HOUR: CPT

## 2022-12-01 RX ADMIN — INSULIN GLARGINE 20 UNIT(S): 100 INJECTION, SOLUTION SUBCUTANEOUS at 10:43

## 2022-12-01 RX ADMIN — Medication 650 MILLIGRAM(S): at 05:35

## 2022-12-01 RX ADMIN — Medication 2: at 05:22

## 2022-12-01 RX ADMIN — Medication 4 UNIT(S): at 12:10

## 2022-12-01 RX ADMIN — Medication 4 UNIT(S): at 17:38

## 2022-12-01 RX ADMIN — Medication 650 MILLIGRAM(S): at 17:39

## 2022-12-01 RX ADMIN — LACOSAMIDE 110 MILLIGRAM(S): 50 TABLET ORAL at 06:16

## 2022-12-01 RX ADMIN — Medication 650 MILLIGRAM(S): at 14:04

## 2022-12-01 RX ADMIN — Medication 650 MILLIGRAM(S): at 05:21

## 2022-12-01 RX ADMIN — CHLORHEXIDINE GLUCONATE 15 MILLILITER(S): 213 SOLUTION TOPICAL at 05:21

## 2022-12-01 RX ADMIN — HEPARIN SODIUM 5000 UNIT(S): 5000 INJECTION INTRAVENOUS; SUBCUTANEOUS at 21:02

## 2022-12-01 RX ADMIN — LEVETIRACETAM 400 MILLIGRAM(S): 250 TABLET, FILM COATED ORAL at 06:16

## 2022-12-01 RX ADMIN — LACOSAMIDE 110 MILLIGRAM(S): 50 TABLET ORAL at 21:48

## 2022-12-01 RX ADMIN — SENNA PLUS 2 TABLET(S): 8.6 TABLET ORAL at 21:02

## 2022-12-01 RX ADMIN — POLYETHYLENE GLYCOL 3350 17 GRAM(S): 17 POWDER, FOR SOLUTION ORAL at 12:10

## 2022-12-01 RX ADMIN — Medication 2: at 12:17

## 2022-12-01 RX ADMIN — Medication 2: at 17:37

## 2022-12-01 RX ADMIN — CHLORHEXIDINE GLUCONATE 1 APPLICATION(S): 213 SOLUTION TOPICAL at 05:21

## 2022-12-01 RX ADMIN — Medication 500 MILLIGRAM(S): at 21:02

## 2022-12-01 RX ADMIN — Medication 650 MILLIGRAM(S): at 23:59

## 2022-12-01 RX ADMIN — CHLORHEXIDINE GLUCONATE 15 MILLILITER(S): 213 SOLUTION TOPICAL at 17:38

## 2022-12-01 RX ADMIN — Medication 500 MILLIGRAM(S): at 05:22

## 2022-12-01 RX ADMIN — Medication 650 MILLIGRAM(S): at 12:10

## 2022-12-01 RX ADMIN — Medication 81 MILLIGRAM(S): at 12:10

## 2022-12-01 RX ADMIN — CEFEPIME 100 MILLIGRAM(S): 1 INJECTION, POWDER, FOR SOLUTION INTRAMUSCULAR; INTRAVENOUS at 21:02

## 2022-12-01 RX ADMIN — LEVETIRACETAM 400 MILLIGRAM(S): 250 TABLET, FILM COATED ORAL at 21:02

## 2022-12-01 RX ADMIN — CEFEPIME 100 MILLIGRAM(S): 1 INJECTION, POWDER, FOR SOLUTION INTRAMUSCULAR; INTRAVENOUS at 14:09

## 2022-12-01 RX ADMIN — CEFEPIME 100 MILLIGRAM(S): 1 INJECTION, POWDER, FOR SOLUTION INTRAMUSCULAR; INTRAVENOUS at 05:22

## 2022-12-01 RX ADMIN — LEVETIRACETAM 400 MILLIGRAM(S): 250 TABLET, FILM COATED ORAL at 14:09

## 2022-12-01 RX ADMIN — Medication 650 MILLIGRAM(S): at 18:09

## 2022-12-01 RX ADMIN — LACOSAMIDE 110 MILLIGRAM(S): 50 TABLET ORAL at 15:02

## 2022-12-01 RX ADMIN — Medication 500 MILLIGRAM(S): at 14:09

## 2022-12-01 RX ADMIN — HEPARIN SODIUM 5000 UNIT(S): 5000 INJECTION INTRAVENOUS; SUBCUTANEOUS at 14:09

## 2022-12-01 NOTE — PROGRESS NOTE ADULT - ASSESSMENT
73 y/o M w/CAD s/p CABG, HFpEF, COPD, CKD and prior CVA admitted following hypoglycemic episode and cardiac arrest. Acute respiratory failure with hypoxia and hypercapnia secondary to arrest. Takotsubo cardiomyopathy found on Echo. Seizures likely secondary to anoxic brain injury from cardiac arrest. ELMER likely ATN. Shock liver. E. Coli UTI. Trach/PEG planned for Wed 11/30. Patient remains medically optimized for planned tracheostomy and PEG.    - Wean AEDs as per neuro  - Daily SAT/SBT, awaiting trach/PEG  - Monitor off pressors  - Complete course of abx  - DVT prophylaxis  - Full code    I have personally provided 35 minutes of attending critical care time excluding procedures.

## 2022-12-01 NOTE — CHART NOTE - NSCHARTNOTEFT_GEN_A_CORE
Assessment:  Pt seen in ICU, awake, on vent, OGT feeding infusing c Glucerna 1.2 @ 55 ml/hr.  Pt adm c dx of hypoglycemia, cardiac arrest, hypothermia, c acure respiratory failure, seizures, ELMER, shock liver, E. Coli, UTI, plan for trach/PEG noted.  PMH include COPD, CHF, DM( was on Jardiance, Metformin, Victoza pen, Insulin Lispro 10 units, 2 x day, and Insulin Lantus 20 units per medication list provided by son on 11/16), HTN, HLD, CAD, CABG, CKD, CVA, BPH.    Factors impacting intake: [ ] none [ ] nausea  [ ] vomiting [ ] diarrhea [ ] constipation  [ ]chewing problems [ ] swallowing issues  [x ] other: tolerating OGT feeding     Diet Prescription: Diet, NPO after Midnight:      NPO Start Date: 01-Dec-2022,   NPO Start Time: 23:59 (12-01-22 @ 08:58)  Diet, NPO with Tube Feed:   Tube Feeding Modality: Orogastric  Glucerna 1.2 Pedrito  Total Volume for 24 Hours (mL): 1320  Continuous  Starting Tube Feed Rate {mL per Hour}: 40  Increase Tube Feed Rate by (mL): 10     Every 12 hours  Until Goal Tube Feed Rate (mL per Hour): 55  Tube Feed Duration (in Hours): 24  Tube Feed Start Time: 14:00 (11-23-22 @ 13:49)    Intake: Glucerna 1.2 @ goal rate of 55 mL/hr x 24 hrs =1320 ml (1584 pedrito, 79 gm pro, 1069 mL free water, 105% RDIs)     Current Weight: 12/01, 80.8 kg, 11/14, 82.2 kg, c wt. loss of 1.4 kg  % Weight Change: 1.70%  12/01, 2+ edema of arms, wrists, hands noted   Pt c visible mild orbital and temple depletion.    Pertinent Medications: MEDICATIONS  (STANDING):  acetaminophen     Tablet .. 650 milliGRAM(s) Oral every 6 hours  aspirin  chewable 81 milliGRAM(s) Oral daily  cefepime   IVPB      cefepime   IVPB 1000 milliGRAM(s) IV Intermittent every 8 hours  chlorhexidine 0.12% Liquid 15 milliLiter(s) Oral Mucosa every 12 hours  chlorhexidine 2% Cloths 1 Application(s) Topical <User Schedule>  heparin   Injectable 5000 Unit(s) SubCutaneous every 8 hours  insulin glargine Injectable (LANTUS) 20 Unit(s) SubCutaneous every morning  insulin glargine Injectable (LANTUS) 20 Unit(s) SubCutaneous at bedtime  insulin lispro (ADMELOG) corrective regimen sliding scale   SubCutaneous every 6 hours  insulin lispro Injectable (ADMELOG) 4 Unit(s) SubCutaneous every 6 hours  lacosamide IVPB 50 milliGRAM(s) IV Intermittent <User Schedule>  levETIRAcetam  IVPB 1000 milliGRAM(s) IV Intermittent <User Schedule>  polyethylene glycol 3350 17 Gram(s) Oral daily  senna 2 Tablet(s) Oral at bedtime  valproic  acid Syrup 500 milliGRAM(s) Enteral Tube every 8 hours    MEDICATIONS  (PRN):    Pertinent Labs: 12-01 Na140 mmol/L Glu 183 mg/dL<H> K+ 4.0 mmol/L Cr  0.93 mg/dL BUN 42 mg/dL<H> 12-01 Phos 3.2 mg/dL 11-30 Alb 1.7 g/dL<L>11-30 ALT 56 U/L AST 37 U/L Alkaline Phosphatase 118 U/L  11-14-22 @ 05:30 a1c 8.8<H>   CAPILLARY BLOOD GLUCOSE      POCT Blood Glucose.: 196 mg/dL (01 Dec 2022 12:13)  POCT Blood Glucose.: 155 mg/dL (01 Dec 2022 05:03)  POCT Blood Glucose.: 228 mg/dL (30 Nov 2022 23:44)  POCT Blood Glucose.: 167 mg/dL (30 Nov 2022 21:04)  POCT Blood Glucose.: 233 mg/dL (30 Nov 2022 18:06)    Skin:   Pressure ulcer x 2  1. 11/21, left ear; suspected deep tissue injury  2. 12/01; sacrum; stage II  12/01; IAD  11/21, skin tear noted    Estimated Needs:   [x ] no change since previous assessment(11/16 & 11/23 for protein for critical care and for pressure ulcers ); current enteral feeding regimen is meeting estimated protein-energy needs   [ ] recalculated:     Previous Nutrition Diagnosis: (11/16)  Inadequate Energy Intake  Etiology: Acute illness; s/p cardiac arrest; OGT decompression  	  Signs/Symptoms: Patient NPO x 2 days  	  Goal/Expected Outcome: Patient to meet >50-75% of protein-energy needs via OGT; met    Nutrition Diagnosis is [ ] ongoing  [x ] resolved [ ] not applicable       New Nutrition Diagnosis: [x ] not applicable     Interventions:   Recommend  [ ] Change Diet To:  [ ] Nutrition Supplement  [x ] Nutrition Support; Continue c current enteral feeding regimen   [x ] Other: Endocrine consult as indicated     Monitoring and Evaluation:   [ ] PO intake [ x ] Tolerance to diet prescription [ x ] weights [ x ] labs[ x ] follow up per protocol  [ ] other:

## 2022-12-01 NOTE — PROGRESS NOTE ADULT - SUBJECTIVE AND OBJECTIVE BOX
HPI:  Mr. Hamilton is a 72 year old male with a PMH of lacunar infarct, CHF (EF ~40% per son), CAD s/p PCI with stent and CABGx3 (~2020), s/p PPM (medtronic), HTN, COPD, DMII on insulin and PO meds, BPH, and CKD (stage II?) presenting to ICU s/p cardiac arrest in ED. Per patient son he has recently been doing well and in his normal state of health, however, yesterday Mr. Hamilton started c/o low blood sugars on glucometer. Pt reportedly continued to take his home medications, including both oral antidiabetic agents and insulin (both long and short acting). Today his son found him minimally responsive hanging off his bed, checked his glucose and it read "low," so he called 911. In ED pt was noted to be hypothermic with glucose noted to be 70 just prior to PEA arrest. Pt was coded for ~5 minutes with 2 epi given with ROSC. Pt intubated at that time. is moving all extremities equally and with reactive, equal pupils. He remained hypothermic and is not requiring vasopressor support. Pending CT Scans. ROS otherwise negative per son besides hypoglycemia, with no known sick contacts. Pt accepted to ICU for further management / care.  (14 Nov 2022 01:33)      24 hr events: No acute events. Awaiting trach. No chest pain, dyspnea, fevers, chills, nausea, emesis, or diarrhea.     ## ROS:  See above. ROS otherwise negative.     ## Vitals  ICU Vital Signs Last 24 Hrs  T(C): 37.4 (01 Dec 2022 08:00), Max: 37.6 (30 Nov 2022 15:00)  T(F): 99.4 (01 Dec 2022 08:00), Max: 99.7 (30 Nov 2022 15:00)  HR: 63 (01 Dec 2022 08:51) (63 - 93)  BP: 100/55 (01 Dec 2022 08:00) (54/29 - 116/70)  BP(mean): 67 (01 Dec 2022 08:00) (33 - 81)  ABP: --  ABP(mean): --  RR: 14 (01 Dec 2022 08:00) (12 - 20)  SpO2: 100% (01 Dec 2022 08:51) (97% - 100%)        ## Physical Exam:  Gen: Elderly male lying in bed, NAD  HEENT: NC/AT sclerae anicteric. ET tube in place  Resp: Mechanical breath sounds b/l  CV: S1, S2  Abd: Soft, + BS  Ext: WWP  Neuro: Awake, follows commands. Nods yes/no to questions    ## Vent Data  Mode: AC/ CMV (Assist Control/ Continuous Mandatory Ventilation)  RR (machine): 14  TV (machine): 450  FiO2: 30  PEEP: 5  ITime: 1  MAP: 9  PIP: 25      ## Labs:  Chem:  12-01    140  |  104  |  42<H>  ----------------------------<  183<H>  4.0   |  32<H>  |  0.93    Ca    7.9<L>      01 Dec 2022 02:45  Phos  3.2     12-01  Mg     2.6     12-01    TPro  5.7<L>  /  Alb  1.7<L>  /  TBili  0.3  /  DBili  x   /  AST  37  /  ALT  56  /  AlkPhos  118  11-30    LIVER FUNCTIONS - ( 30 Nov 2022 02:30 )  Alb: 1.7 g/dL / Pro: 5.7 gm/dL / ALK PHOS: 118 U/L / ALT: 56 U/L / AST: 37 U/L / GGT: x           CBC:                        10.3   11.04 )-----------( 327      ( 01 Dec 2022 02:45 )             33.1     Coags:  PT/INR - ( 01 Dec 2022 02:45 )   PT: 11.7 sec;   INR: 0.98 ratio         PTT - ( 01 Dec 2022 02:45 )  PTT:18.8 sec        ## Cardiac        ## Blood Gas      #I/Os  I&O's Detail    30 Nov 2022 07:01  -  01 Dec 2022 07:00  --------------------------------------------------------  IN:    Enteral Tube Flush: 450 mL    Glucerna: 495 mL    IV PiggyBack: 400 mL  Total IN: 1345 mL    OUT:    Incontinent per Condom Catheter (mL): 225 mL  Total OUT: 225 mL    Total NET: 1120 mL          ## Imaging:    ## Medications:  MEDICATIONS  (STANDING):  acetaminophen     Tablet .. 650 milliGRAM(s) Oral every 6 hours  aspirin  chewable 81 milliGRAM(s) Oral daily  cefepime   IVPB      cefepime   IVPB 1000 milliGRAM(s) IV Intermittent every 8 hours  chlorhexidine 0.12% Liquid 15 milliLiter(s) Oral Mucosa every 12 hours  chlorhexidine 2% Cloths 1 Application(s) Topical <User Schedule>  heparin   Injectable 5000 Unit(s) SubCutaneous every 8 hours  insulin glargine Injectable (LANTUS) 20 Unit(s) SubCutaneous every morning  insulin glargine Injectable (LANTUS) 20 Unit(s) SubCutaneous at bedtime  insulin lispro (ADMELOG) corrective regimen sliding scale   SubCutaneous every 6 hours  insulin lispro Injectable (ADMELOG) 4 Unit(s) SubCutaneous every 6 hours  lacosamide IVPB 50 milliGRAM(s) IV Intermittent <User Schedule>  levETIRAcetam  IVPB 1000 milliGRAM(s) IV Intermittent <User Schedule>  polyethylene glycol 3350 17 Gram(s) Oral daily  senna 2 Tablet(s) Oral at bedtime  valproic  acid Syrup 500 milliGRAM(s) Enteral Tube every 8 hours    MEDICATIONS  (PRN):

## 2022-12-02 LAB
ALBUMIN SERPL ELPH-MCNC: 1.6 G/DL — LOW (ref 3.3–5)
ALP SERPL-CCNC: 110 U/L — SIGNIFICANT CHANGE UP (ref 40–120)
ALT FLD-CCNC: 58 U/L — SIGNIFICANT CHANGE UP (ref 12–78)
ANION GAP SERPL CALC-SCNC: 3 MMOL/L — LOW (ref 5–17)
APTT BLD: 27.7 SEC — SIGNIFICANT CHANGE UP (ref 27.5–35.5)
AST SERPL-CCNC: 49 U/L — HIGH (ref 15–37)
BASOPHILS # BLD AUTO: 0.04 K/UL — SIGNIFICANT CHANGE UP (ref 0–0.2)
BASOPHILS NFR BLD AUTO: 0.4 % — SIGNIFICANT CHANGE UP (ref 0–2)
BILIRUB SERPL-MCNC: 0.3 MG/DL — SIGNIFICANT CHANGE UP (ref 0.2–1.2)
BUN SERPL-MCNC: 34 MG/DL — HIGH (ref 7–23)
CALCIUM SERPL-MCNC: 8.2 MG/DL — LOW (ref 8.5–10.1)
CHLORIDE SERPL-SCNC: 103 MMOL/L — SIGNIFICANT CHANGE UP (ref 96–108)
CO2 SERPL-SCNC: 34 MMOL/L — HIGH (ref 22–31)
CREAT SERPL-MCNC: 0.86 MG/DL — SIGNIFICANT CHANGE UP (ref 0.5–1.3)
EGFR: 91 ML/MIN/1.73M2 — SIGNIFICANT CHANGE UP
EOSINOPHIL # BLD AUTO: 0.37 K/UL — SIGNIFICANT CHANGE UP (ref 0–0.5)
EOSINOPHIL NFR BLD AUTO: 3.6 % — SIGNIFICANT CHANGE UP (ref 0–6)
GLUCOSE BLDC GLUCOMTR-MCNC: 140 MG/DL — HIGH (ref 70–99)
GLUCOSE BLDC GLUCOMTR-MCNC: 144 MG/DL — HIGH (ref 70–99)
GLUCOSE BLDC GLUCOMTR-MCNC: 155 MG/DL — HIGH (ref 70–99)
GLUCOSE BLDC GLUCOMTR-MCNC: 171 MG/DL — HIGH (ref 70–99)
GLUCOSE SERPL-MCNC: 168 MG/DL — HIGH (ref 70–99)
HCT VFR BLD CALC: 33.2 % — LOW (ref 39–50)
HGB BLD-MCNC: 10.5 G/DL — LOW (ref 13–17)
IMM GRANULOCYTES NFR BLD AUTO: 10.3 % — HIGH (ref 0–0.9)
INR BLD: 0.98 RATIO — SIGNIFICANT CHANGE UP (ref 0.88–1.16)
LYMPHOCYTES # BLD AUTO: 1.96 K/UL — SIGNIFICANT CHANGE UP (ref 1–3.3)
LYMPHOCYTES # BLD AUTO: 19 % — SIGNIFICANT CHANGE UP (ref 13–44)
MAGNESIUM SERPL-MCNC: 2.6 MG/DL — SIGNIFICANT CHANGE UP (ref 1.6–2.6)
MCHC RBC-ENTMCNC: 30.8 PG — SIGNIFICANT CHANGE UP (ref 27–34)
MCHC RBC-ENTMCNC: 31.6 G/DL — LOW (ref 32–36)
MCV RBC AUTO: 97.4 FL — SIGNIFICANT CHANGE UP (ref 80–100)
MONOCYTES # BLD AUTO: 0.98 K/UL — HIGH (ref 0–0.9)
MONOCYTES NFR BLD AUTO: 9.5 % — SIGNIFICANT CHANGE UP (ref 2–14)
NEUTROPHILS # BLD AUTO: 5.9 K/UL — SIGNIFICANT CHANGE UP (ref 1.8–7.4)
NEUTROPHILS NFR BLD AUTO: 57.2 % — SIGNIFICANT CHANGE UP (ref 43–77)
NRBC # BLD: 0 /100 WBCS — SIGNIFICANT CHANGE UP (ref 0–0)
PHOSPHATE SERPL-MCNC: 3 MG/DL — SIGNIFICANT CHANGE UP (ref 2.5–4.5)
PLATELET # BLD AUTO: 364 K/UL — SIGNIFICANT CHANGE UP (ref 150–400)
POTASSIUM SERPL-MCNC: 4 MMOL/L — SIGNIFICANT CHANGE UP (ref 3.5–5.3)
POTASSIUM SERPL-SCNC: 4 MMOL/L — SIGNIFICANT CHANGE UP (ref 3.5–5.3)
PROT SERPL-MCNC: 5.5 GM/DL — LOW (ref 6–8.3)
PROTHROM AB SERPL-ACNC: 11.8 SEC — SIGNIFICANT CHANGE UP (ref 10.5–13.4)
RAPID RVP RESULT: SIGNIFICANT CHANGE UP
RBC # BLD: 3.41 M/UL — LOW (ref 4.2–5.8)
RBC # FLD: 14 % — SIGNIFICANT CHANGE UP (ref 10.3–14.5)
SARS-COV-2 RNA SPEC QL NAA+PROBE: SIGNIFICANT CHANGE UP
SODIUM SERPL-SCNC: 140 MMOL/L — SIGNIFICANT CHANGE UP (ref 135–145)
WBC # BLD: 10.31 K/UL — SIGNIFICANT CHANGE UP (ref 3.8–10.5)
WBC # FLD AUTO: 10.31 K/UL — SIGNIFICANT CHANGE UP (ref 3.8–10.5)

## 2022-12-02 PROCEDURE — 99291 CRITICAL CARE FIRST HOUR: CPT

## 2022-12-02 RX ORDER — COLLAGENASE CLOSTRIDIUM HIST. 250 UNIT/G
1 OINTMENT (GRAM) TOPICAL
Refills: 0 | Status: DISCONTINUED | OUTPATIENT
Start: 2022-12-02 | End: 2023-02-21

## 2022-12-02 RX ADMIN — CEFEPIME 100 MILLIGRAM(S): 1 INJECTION, POWDER, FOR SOLUTION INTRAMUSCULAR; INTRAVENOUS at 05:08

## 2022-12-02 RX ADMIN — HEPARIN SODIUM 5000 UNIT(S): 5000 INJECTION INTRAVENOUS; SUBCUTANEOUS at 15:21

## 2022-12-02 RX ADMIN — Medication 500 MILLIGRAM(S): at 16:13

## 2022-12-02 RX ADMIN — Medication 2: at 18:48

## 2022-12-02 RX ADMIN — LEVETIRACETAM 400 MILLIGRAM(S): 250 TABLET, FILM COATED ORAL at 21:58

## 2022-12-02 RX ADMIN — CHLORHEXIDINE GLUCONATE 15 MILLILITER(S): 213 SOLUTION TOPICAL at 18:47

## 2022-12-02 RX ADMIN — INSULIN GLARGINE 20 UNIT(S): 100 INJECTION, SOLUTION SUBCUTANEOUS at 12:06

## 2022-12-02 RX ADMIN — Medication 650 MILLIGRAM(S): at 12:03

## 2022-12-02 RX ADMIN — LEVETIRACETAM 400 MILLIGRAM(S): 250 TABLET, FILM COATED ORAL at 05:09

## 2022-12-02 RX ADMIN — CHLORHEXIDINE GLUCONATE 1 APPLICATION(S): 213 SOLUTION TOPICAL at 23:47

## 2022-12-02 RX ADMIN — Medication 1 APPLICATION(S): at 18:49

## 2022-12-02 RX ADMIN — POLYETHYLENE GLYCOL 3350 17 GRAM(S): 17 POWDER, FOR SOLUTION ORAL at 12:03

## 2022-12-02 RX ADMIN — Medication 500 MILLIGRAM(S): at 21:59

## 2022-12-02 RX ADMIN — Medication 650 MILLIGRAM(S): at 05:57

## 2022-12-02 RX ADMIN — CEFEPIME 100 MILLIGRAM(S): 1 INJECTION, POWDER, FOR SOLUTION INTRAMUSCULAR; INTRAVENOUS at 15:17

## 2022-12-02 RX ADMIN — Medication 4: at 00:00

## 2022-12-02 RX ADMIN — LEVETIRACETAM 400 MILLIGRAM(S): 250 TABLET, FILM COATED ORAL at 15:17

## 2022-12-02 RX ADMIN — Medication 500 MILLIGRAM(S): at 05:08

## 2022-12-02 RX ADMIN — Medication 650 MILLIGRAM(S): at 05:08

## 2022-12-02 RX ADMIN — LACOSAMIDE 110 MILLIGRAM(S): 50 TABLET ORAL at 05:51

## 2022-12-02 RX ADMIN — CEFEPIME 100 MILLIGRAM(S): 1 INJECTION, POWDER, FOR SOLUTION INTRAMUSCULAR; INTRAVENOUS at 21:58

## 2022-12-02 RX ADMIN — Medication 81 MILLIGRAM(S): at 12:02

## 2022-12-02 RX ADMIN — CHLORHEXIDINE GLUCONATE 15 MILLILITER(S): 213 SOLUTION TOPICAL at 05:08

## 2022-12-02 RX ADMIN — SENNA PLUS 2 TABLET(S): 8.6 TABLET ORAL at 21:59

## 2022-12-02 RX ADMIN — Medication 4 UNIT(S): at 12:05

## 2022-12-02 RX ADMIN — Medication 2: at 05:51

## 2022-12-02 RX ADMIN — Medication 650 MILLIGRAM(S): at 00:19

## 2022-12-02 RX ADMIN — LACOSAMIDE 110 MILLIGRAM(S): 50 TABLET ORAL at 21:59

## 2022-12-02 RX ADMIN — LACOSAMIDE 110 MILLIGRAM(S): 50 TABLET ORAL at 16:13

## 2022-12-02 RX ADMIN — Medication 4 UNIT(S): at 18:47

## 2022-12-02 RX ADMIN — Medication 650 MILLIGRAM(S): at 12:50

## 2022-12-02 RX ADMIN — CHLORHEXIDINE GLUCONATE 1 APPLICATION(S): 213 SOLUTION TOPICAL at 05:09

## 2022-12-02 RX ADMIN — HEPARIN SODIUM 5000 UNIT(S): 5000 INJECTION INTRAVENOUS; SUBCUTANEOUS at 21:59

## 2022-12-02 NOTE — PROGRESS NOTE ADULT - ASSESSMENT
73 y/o M w/CAD s/p CABG, HFpEF, COPD, CKD and prior CVA admitted following hypoglycemic episode and cardiac arrest. Acute respiratory failure with hypoxia and hypercapnia secondary to arrest. Takotsubo cardiomyopathy found on Echo. Seizures likely secondary to anoxic brain injury from cardiac arrest. ELMER likely ATN. Shock liver. E. Coli UTI. Trach/PEG planned for Wed 11/30. Patient remains medically optimized for planned tracheostomy and PEG.    - Wean AEDs as per neuro  - Daily SAT/SBT, awaiting trach/PEG  - Monitor off pressors  - Complete course of abx  - DVT prophylaxis  - Full code

## 2022-12-02 NOTE — CHART NOTE - NSCHARTNOTEFT_GEN_A_CORE
OR rescheduled for monday.   please make npo for monday  repeat covid swab over the weekend    consent on chart.  Dr. Perdomo attending.

## 2022-12-02 NOTE — PHYSICAL THERAPY INITIAL EVALUATION ADULT - PERTINENT HX OF CURRENT PROBLEM, REHAB EVAL
Mr. Hamilton is a 72 year old male with a PMH of lacunar infarct, CHF (EF ~40% per son), CAD s/p PCI with stent and CABGx3 (~2020), s/p PPM (medtronic), HTN, COPD, DMII on insulin and PO meds, BPH, and CKD (stage II?) presenting to ICU s/p cardiac arrest in ED. Per patient son he has recently been doing well and in his normal state of health, however, yesterday Mr. Hamilton started c/o low blood sugars on glucometer. Pt reportedly continued to take his home medications, including both oral antidiabetic agents and insulin (both long and short acting). Today his son found him minimally responsive hanging off his bed, checked his glucose and it read "low," so he called 911. In ED pt was noted to be hypothermic with glucose noted to be 70 just prior to PEA arrest. Pt was coded for ~5 minutes with 2 epi given with ROSC. Pt intubated at that time. is moving all extremities equally and with reactive, equal pupils. He remained hypothermic and is not requiring vasopressor support. Pending CT Scans. ROS otherwise negative per son besides hypoglycemia, with no known sick contacts. Pt accepted to ICU for further management / care. Pt scheduled for trach and peg placement on 12/5.

## 2022-12-02 NOTE — PROGRESS NOTE ADULT - SUBJECTIVE AND OBJECTIVE BOX
CC: Patient is a 72y old  Male who presents with a chief complaint of s/p cardiac arrest, hypoglycemia (03 Dec 2022 11:21)      ## HPI:HPI:  Mr. Hamilton is a 72 year old male with a PMH of lacunar infarct, CHF (EF ~40% per son), CAD s/p PCI with stent and CABGx3 (~2020), s/p PPM (medtronic), HTN, COPD, DMII on insulin and PO meds, BPH, and CKD (stage II?) presenting to ICU s/p cardiac arrest in ED. Per patient son he has recently been doing well and in his normal state of health, however, yesterday Mr. Hamilton started c/o low blood sugars on glucometer. Pt reportedly continued to take his home medications, including both oral antidiabetic agents and insulin (both long and short acting). Today his son found him minimally responsive hanging off his bed, checked his glucose and it read "low," so he called 911. In ED pt was noted to be hypothermic with glucose noted to be 70 just prior to PEA arrest. Pt was coded for ~5 minutes with 2 epi given with ROSC. Pt intubated at that time. is moving all extremities equally and with reactive, equal pupils. He remained hypothermic and is not requiring vasopressor support. Pending CT Scans. ROS otherwise negative per son besides hypoglycemia, with no known sick contacts. Pt accepted to ICU for further management / care.  (14 Nov 2022 01:33)      O/N: no events    ## ROS:  denies complaints    O2 Parameters below as of 03 Dec 2022 07:05  Patient On (Oxygen Delivery Method): ventilator    O2 Concentration (%): 30      CON : NAD  EENT : EOMI, MMM  NECK : Full ROM  RESP : CTAB no increased WOB  CARD : rrr no m/r/g  ABD : S NT ND NABS no rebound  EXT : No edema  NEURO : AAOX3           MICROBIOLOGY/CULTURES:  Culture Results:   >100,000 CFU/ml Escherichia coli (11-26 @ 12:00)              ## Medications:  MEDICATIONS  (STANDING):  albuterol/ipratropium for Nebulization 3 milliLiter(s) Nebulizer every 6 hours  aspirin  chewable 81 milliGRAM(s) Oral daily  cefepime   IVPB      cefepime   IVPB 1000 milliGRAM(s) IV Intermittent every 8 hours  chlorhexidine 0.12% Liquid 15 milliLiter(s) Oral Mucosa every 12 hours  chlorhexidine 2% Cloths 1 Application(s) Topical <User Schedule>  collagenase Ointment 1 Application(s) Topical two times a day  heparin   Injectable 5000 Unit(s) SubCutaneous every 8 hours  insulin glargine Injectable (LANTUS) 20 Unit(s) SubCutaneous every morning  insulin glargine Injectable (LANTUS) 20 Unit(s) SubCutaneous at bedtime  insulin lispro (ADMELOG) corrective regimen sliding scale   SubCutaneous every 6 hours  insulin lispro Injectable (ADMELOG) 4 Unit(s) SubCutaneous every 6 hours  levETIRAcetam  IVPB 1000 milliGRAM(s) IV Intermittent <User Schedule>  polyethylene glycol 3350 17 Gram(s) Oral daily  senna 2 Tablet(s) Oral at bedtime  sodium chloride 3%  Inhalation 4 milliLiter(s) Inhalation every 6 hours  valproic  acid Syrup 500 milliGRAM(s) Enteral Tube every 8 hours    ## O/E:I&O's Summary    02 Dec 2022 07:01  -  03 Dec 2022 07:00  --------------------------------------------------------  IN: 2605 mL / OUT: 600 mL / NET: 2005 mL        POCUS :   DVT PPX hep  ## Code status:  Goals of care discussion: [] yes [ ] no  [x] full code  [ ] DNR  [ ] DNI  [ ] JOSÉ MANUEL

## 2022-12-03 LAB
ALBUMIN SERPL ELPH-MCNC: 1.6 G/DL — LOW (ref 3.3–5)
ALP SERPL-CCNC: 109 U/L — SIGNIFICANT CHANGE UP (ref 40–120)
ALT FLD-CCNC: 60 U/L — SIGNIFICANT CHANGE UP (ref 12–78)
ANION GAP SERPL CALC-SCNC: 3 MMOL/L — LOW (ref 5–17)
AST SERPL-CCNC: 51 U/L — HIGH (ref 15–37)
BASE EXCESS BLDA CALC-SCNC: 11.2 MMOL/L — HIGH (ref -2–3)
BASOPHILS # BLD AUTO: 0.1 K/UL — SIGNIFICANT CHANGE UP (ref 0–0.2)
BASOPHILS NFR BLD AUTO: 1.1 % — SIGNIFICANT CHANGE UP (ref 0–2)
BILIRUB SERPL-MCNC: 0.3 MG/DL — SIGNIFICANT CHANGE UP (ref 0.2–1.2)
BLOOD GAS COMMENTS ARTERIAL: SIGNIFICANT CHANGE UP
BUN SERPL-MCNC: 28 MG/DL — HIGH (ref 7–23)
CALCIUM SERPL-MCNC: 8.3 MG/DL — LOW (ref 8.5–10.1)
CHLORIDE SERPL-SCNC: 103 MMOL/L — SIGNIFICANT CHANGE UP (ref 96–108)
CO2 BLDA-SCNC: 37 MMOL/L — HIGH (ref 19–24)
CO2 SERPL-SCNC: 32 MMOL/L — HIGH (ref 22–31)
CREAT SERPL-MCNC: 0.73 MG/DL — SIGNIFICANT CHANGE UP (ref 0.5–1.3)
EGFR: 95 ML/MIN/1.73M2 — SIGNIFICANT CHANGE UP
EOSINOPHIL # BLD AUTO: 0.32 K/UL — SIGNIFICANT CHANGE UP (ref 0–0.5)
EOSINOPHIL NFR BLD AUTO: 3.5 % — SIGNIFICANT CHANGE UP (ref 0–6)
GAS PNL BLDA: SIGNIFICANT CHANGE UP
GAS PNL BLDA: SIGNIFICANT CHANGE UP
GLUCOSE BLDC GLUCOMTR-MCNC: 118 MG/DL — HIGH (ref 70–99)
GLUCOSE BLDC GLUCOMTR-MCNC: 132 MG/DL — HIGH (ref 70–99)
GLUCOSE BLDC GLUCOMTR-MCNC: 163 MG/DL — HIGH (ref 70–99)
GLUCOSE BLDC GLUCOMTR-MCNC: 176 MG/DL — HIGH (ref 70–99)
GLUCOSE BLDC GLUCOMTR-MCNC: 205 MG/DL — HIGH (ref 70–99)
GLUCOSE SERPL-MCNC: 180 MG/DL — HIGH (ref 70–99)
HCO3 BLDA-SCNC: 36 MMOL/L — HIGH (ref 21–28)
HCT VFR BLD CALC: 32 % — LOW (ref 39–50)
HGB BLD-MCNC: 9.9 G/DL — LOW (ref 13–17)
HOROWITZ INDEX BLDA+IHG-RTO: 0.3 — SIGNIFICANT CHANGE UP
IMM GRANULOCYTES NFR BLD AUTO: 8.6 % — HIGH (ref 0–0.9)
LYMPHOCYTES # BLD AUTO: 1.58 K/UL — SIGNIFICANT CHANGE UP (ref 1–3.3)
LYMPHOCYTES # BLD AUTO: 17.3 % — SIGNIFICANT CHANGE UP (ref 13–44)
MAGNESIUM SERPL-MCNC: 2.3 MG/DL — SIGNIFICANT CHANGE UP (ref 1.6–2.6)
MCHC RBC-ENTMCNC: 30.2 PG — SIGNIFICANT CHANGE UP (ref 27–34)
MCHC RBC-ENTMCNC: 30.9 G/DL — LOW (ref 32–36)
MCV RBC AUTO: 97.6 FL — SIGNIFICANT CHANGE UP (ref 80–100)
MONOCYTES # BLD AUTO: 0.92 K/UL — HIGH (ref 0–0.9)
MONOCYTES NFR BLD AUTO: 10.1 % — SIGNIFICANT CHANGE UP (ref 2–14)
NEUTROPHILS # BLD AUTO: 5.41 K/UL — SIGNIFICANT CHANGE UP (ref 1.8–7.4)
NEUTROPHILS NFR BLD AUTO: 59.4 % — SIGNIFICANT CHANGE UP (ref 43–77)
NRBC # BLD: 0 /100 WBCS — SIGNIFICANT CHANGE UP (ref 0–0)
PCO2 BLDA: 46 MMHG — SIGNIFICANT CHANGE UP (ref 32–46)
PH BLDA: 7.5 — HIGH (ref 7.35–7.45)
PHOSPHATE SERPL-MCNC: 2.6 MG/DL — SIGNIFICANT CHANGE UP (ref 2.5–4.5)
PLATELET # BLD AUTO: 350 K/UL — SIGNIFICANT CHANGE UP (ref 150–400)
PO2 BLDA: 75 MMHG — LOW (ref 83–108)
POTASSIUM SERPL-MCNC: 4.5 MMOL/L — SIGNIFICANT CHANGE UP (ref 3.5–5.3)
POTASSIUM SERPL-SCNC: 4.5 MMOL/L — SIGNIFICANT CHANGE UP (ref 3.5–5.3)
PROT SERPL-MCNC: 5.4 GM/DL — LOW (ref 6–8.3)
RAPID RVP RESULT: SIGNIFICANT CHANGE UP
RBC # BLD: 3.28 M/UL — LOW (ref 4.2–5.8)
RBC # FLD: 14.1 % — SIGNIFICANT CHANGE UP (ref 10.3–14.5)
SAO2 % BLDA: 97.6 % — SIGNIFICANT CHANGE UP (ref 94–98)
SARS-COV-2 RNA SPEC QL NAA+PROBE: SIGNIFICANT CHANGE UP
SODIUM SERPL-SCNC: 138 MMOL/L — SIGNIFICANT CHANGE UP (ref 135–145)
WBC # BLD: 9.11 K/UL — SIGNIFICANT CHANGE UP (ref 3.8–10.5)
WBC # FLD AUTO: 9.11 K/UL — SIGNIFICANT CHANGE UP (ref 3.8–10.5)

## 2022-12-03 PROCEDURE — 99233 SBSQ HOSP IP/OBS HIGH 50: CPT

## 2022-12-03 PROCEDURE — 36569 INSJ PICC 5 YR+ W/O IMAGING: CPT

## 2022-12-03 RX ORDER — MIDAZOLAM HYDROCHLORIDE 1 MG/ML
2 INJECTION, SOLUTION INTRAMUSCULAR; INTRAVENOUS ONCE
Refills: 0 | Status: DISCONTINUED | OUTPATIENT
Start: 2022-12-03 | End: 2022-12-03

## 2022-12-03 RX ORDER — SODIUM CHLORIDE 9 MG/ML
4 INJECTION INTRAMUSCULAR; INTRAVENOUS; SUBCUTANEOUS EVERY 6 HOURS
Refills: 0 | Status: COMPLETED | OUTPATIENT
Start: 2022-12-03 | End: 2022-12-05

## 2022-12-03 RX ORDER — IPRATROPIUM/ALBUTEROL SULFATE 18-103MCG
3 AEROSOL WITH ADAPTER (GRAM) INHALATION EVERY 6 HOURS
Refills: 0 | Status: COMPLETED | OUTPATIENT
Start: 2022-12-03 | End: 2022-12-05

## 2022-12-03 RX ORDER — SODIUM CHLORIDE 9 MG/ML
1000 INJECTION INTRAMUSCULAR; INTRAVENOUS; SUBCUTANEOUS ONCE
Refills: 0 | Status: COMPLETED | OUTPATIENT
Start: 2022-12-03 | End: 2022-12-03

## 2022-12-03 RX ADMIN — CEFEPIME 100 MILLIGRAM(S): 1 INJECTION, POWDER, FOR SOLUTION INTRAMUSCULAR; INTRAVENOUS at 14:15

## 2022-12-03 RX ADMIN — POLYETHYLENE GLYCOL 3350 17 GRAM(S): 17 POWDER, FOR SOLUTION ORAL at 11:45

## 2022-12-03 RX ADMIN — LEVETIRACETAM 400 MILLIGRAM(S): 250 TABLET, FILM COATED ORAL at 14:16

## 2022-12-03 RX ADMIN — Medication 3 MILLILITER(S): at 03:37

## 2022-12-03 RX ADMIN — CEFEPIME 100 MILLIGRAM(S): 1 INJECTION, POWDER, FOR SOLUTION INTRAMUSCULAR; INTRAVENOUS at 05:46

## 2022-12-03 RX ADMIN — Medication 1 APPLICATION(S): at 05:49

## 2022-12-03 RX ADMIN — SODIUM CHLORIDE 4 MILLILITER(S): 9 INJECTION INTRAMUSCULAR; INTRAVENOUS; SUBCUTANEOUS at 23:57

## 2022-12-03 RX ADMIN — HEPARIN SODIUM 5000 UNIT(S): 5000 INJECTION INTRAVENOUS; SUBCUTANEOUS at 22:51

## 2022-12-03 RX ADMIN — INSULIN GLARGINE 20 UNIT(S): 100 INJECTION, SOLUTION SUBCUTANEOUS at 08:41

## 2022-12-03 RX ADMIN — Medication 4 UNIT(S): at 23:27

## 2022-12-03 RX ADMIN — LEVETIRACETAM 400 MILLIGRAM(S): 250 TABLET, FILM COATED ORAL at 05:48

## 2022-12-03 RX ADMIN — Medication 3 MILLILITER(S): at 12:08

## 2022-12-03 RX ADMIN — HEPARIN SODIUM 5000 UNIT(S): 5000 INJECTION INTRAVENOUS; SUBCUTANEOUS at 14:15

## 2022-12-03 RX ADMIN — INSULIN GLARGINE 20 UNIT(S): 100 INJECTION, SOLUTION SUBCUTANEOUS at 23:22

## 2022-12-03 RX ADMIN — Medication 3 MILLILITER(S): at 23:58

## 2022-12-03 RX ADMIN — Medication 81 MILLIGRAM(S): at 11:45

## 2022-12-03 RX ADMIN — Medication 4 UNIT(S): at 17:12

## 2022-12-03 RX ADMIN — HEPARIN SODIUM 5000 UNIT(S): 5000 INJECTION INTRAVENOUS; SUBCUTANEOUS at 05:47

## 2022-12-03 RX ADMIN — LACOSAMIDE 110 MILLIGRAM(S): 50 TABLET ORAL at 05:47

## 2022-12-03 RX ADMIN — Medication 3 MILLILITER(S): at 17:45

## 2022-12-03 RX ADMIN — CHLORHEXIDINE GLUCONATE 15 MILLILITER(S): 213 SOLUTION TOPICAL at 17:13

## 2022-12-03 RX ADMIN — CEFEPIME 100 MILLIGRAM(S): 1 INJECTION, POWDER, FOR SOLUTION INTRAMUSCULAR; INTRAVENOUS at 22:52

## 2022-12-03 RX ADMIN — Medication 500 MILLIGRAM(S): at 22:53

## 2022-12-03 RX ADMIN — Medication 4 UNIT(S): at 05:48

## 2022-12-03 RX ADMIN — MIDAZOLAM HYDROCHLORIDE 2 MILLIGRAM(S): 1 INJECTION, SOLUTION INTRAMUSCULAR; INTRAVENOUS at 23:43

## 2022-12-03 RX ADMIN — SODIUM CHLORIDE 1000 MILLILITER(S): 9 INJECTION INTRAMUSCULAR; INTRAVENOUS; SUBCUTANEOUS at 10:16

## 2022-12-03 RX ADMIN — Medication 2: at 23:26

## 2022-12-03 RX ADMIN — Medication 2: at 11:44

## 2022-12-03 RX ADMIN — LEVETIRACETAM 400 MILLIGRAM(S): 250 TABLET, FILM COATED ORAL at 22:51

## 2022-12-03 RX ADMIN — CHLORHEXIDINE GLUCONATE 15 MILLILITER(S): 213 SOLUTION TOPICAL at 05:49

## 2022-12-03 RX ADMIN — Medication 4 UNIT(S): at 11:45

## 2022-12-03 RX ADMIN — SODIUM CHLORIDE 4 MILLILITER(S): 9 INJECTION INTRAMUSCULAR; INTRAVENOUS; SUBCUTANEOUS at 03:38

## 2022-12-03 RX ADMIN — Medication 500 MILLIGRAM(S): at 05:47

## 2022-12-03 RX ADMIN — SODIUM CHLORIDE 4 MILLILITER(S): 9 INJECTION INTRAMUSCULAR; INTRAVENOUS; SUBCUTANEOUS at 12:08

## 2022-12-03 RX ADMIN — Medication 1 APPLICATION(S): at 17:13

## 2022-12-03 RX ADMIN — SODIUM CHLORIDE 4 MILLILITER(S): 9 INJECTION INTRAMUSCULAR; INTRAVENOUS; SUBCUTANEOUS at 17:44

## 2022-12-03 RX ADMIN — Medication 4: at 05:48

## 2022-12-03 RX ADMIN — Medication 500 MILLIGRAM(S): at 16:24

## 2022-12-03 NOTE — PROGRESS NOTE ADULT - SUBJECTIVE AND OBJECTIVE BOX
CC: Patient is a 72y old  Male who presents with a chief complaint of s/p cardiac arrest, hypoglycemia (01 Dec 2022 09:27)      ## HPI:HPI:  Mr. Hamilton is a 72 year old male with a PMH of lacunar infarct, CHF (EF ~40% per son), CAD s/p PCI with stent and CABGx3 (~2020), s/p PPM (medtronic), HTN, COPD, DMII on insulin and PO meds, BPH, and CKD (stage II?) presenting to ICU s/p cardiac arrest in ED. Per patient son he has recently been doing well and in his normal state of health, however, yesterday Mr. Hamilton started c/o low blood sugars on glucometer. Pt reportedly continued to take his home medications, including both oral antidiabetic agents and insulin (both long and short acting). Today his son found him minimally responsive hanging off his bed, checked his glucose and it read "low," so he called 911. In ED pt was noted to be hypothermic with glucose noted to be 70 just prior to PEA arrest. Pt was coded for ~5 minutes with 2 epi given with ROSC. Pt intubated at that time. is moving all extremities equally and with reactive, equal pupils. He remained hypothermic and is not requiring vasopressor support. Pending CT Scans. ROS otherwise negative per son besides hypoglycemia, with no known sick contacts. Pt accepted to ICU for further management / care.  (14 Nov 2022 01:33)      O/N: no events    ## ROS:  denies complaints  ## EXAM  ICU Vital Signs Last 24 Hrs  T(C): 37.8 (03 Dec 2022 07:00), Max: 37.9 (03 Dec 2022 05:00)  T(F): 100.1 (03 Dec 2022 07:00), Max: 100.3 (03 Dec 2022 05:00)  HR: 77 (03 Dec 2022 10:14) (65 - 88)  BP: 104/58 (03 Dec 2022 09:00) (87/47 - 123/67)  BP(mean): 71 (03 Dec 2022 09:00) (57 - 100)  ABP: --  ABP(mean): --  RR: 17 (03 Dec 2022 09:00) (14 - 29)  SpO2: 100% (03 Dec 2022 10:14) (96% - 100%)    O2 Parameters below as of 03 Dec 2022 07:05  Patient On (Oxygen Delivery Method): ventilator    O2 Concentration (%): 30      CON : NAD  EENT : EOMI, MMM  NECK : Full ROM  RESP : CTAB no increased WOB  CARD : rrr no m/r/g  ABD : S NT ND NABS no rebound  EXT : No edema  NEURO : AAOX3   ## Labs:  Lab Results:  CBC  CBC Full  -  ( 03 Dec 2022 02:43 )  WBC Count : 9.11 K/uL  RBC Count : 3.28 M/uL  Hemoglobin : 9.9 g/dL  Hematocrit : 32.0 %  Platelet Count - Automated : 350 K/uL  Mean Cell Volume : 97.6 fl  Mean Cell Hemoglobin : 30.2 pg  Mean Cell Hemoglobin Concentration : 30.9 g/dL  Auto Neutrophil # : 5.41 K/uL  Auto Lymphocyte # : 1.58 K/uL  Auto Monocyte # : 0.92 K/uL  Auto Eosinophil # : 0.32 K/uL  Auto Basophil # : 0.10 K/uL  Auto Neutrophil % : 59.4 %  Auto Lymphocyte % : 17.3 %  Auto Monocyte % : 10.1 %  Auto Eosinophil % : 3.5 %  Auto Basophil % : 1.1 %    .		Differential:	[] Automated		[] Manual  Chemistry                        9.9    9.11  )-----------( 350      ( 03 Dec 2022 02:43 )             32.0     12-03    138  |  103  |  28<H>  ----------------------------<  180<H>  4.5   |  32<H>  |  0.73    Ca    8.3<L>      03 Dec 2022 02:43  Phos  2.6     12-03  Mg     2.3     12-03    TPro  5.4<L>  /  Alb  1.6<L>  /  TBili  0.3  /  DBili  x   /  AST  51<H>  /  ALT  60  /  AlkPhos  109  12-03    LIVER FUNCTIONS - ( 03 Dec 2022 02:43 )  Alb: 1.6 g/dL / Pro: 5.4 gm/dL / ALK PHOS: 109 U/L / ALT: 60 U/L / AST: 51 U/L / GGT: x           PT/INR - ( 02 Dec 2022 01:55 )   PT: 11.8 sec;   INR: 0.98 ratio         PTT - ( 02 Dec 2022 01:55 )  PTT:27.7 sec      ABG - ( 03 Dec 2022 04:23 )  pH, Arterial: 7.50  pH, Blood: x     /  pCO2: 46    /  pO2: 75    / HCO3: 36    / Base Excess: 11.2  /  SaO2: 97.6                      MICROBIOLOGY/CULTURES:  Culture Results:   >100,000 CFU/ml Escherichia coli (11-26 @ 12:00)      RADIOLOGY RESULTS:        ## Medications:  MEDICATIONS  (STANDING):  albuterol/ipratropium for Nebulization 3 milliLiter(s) Nebulizer every 6 hours  aspirin  chewable 81 milliGRAM(s) Oral daily  cefepime   IVPB      cefepime   IVPB 1000 milliGRAM(s) IV Intermittent every 8 hours  chlorhexidine 0.12% Liquid 15 milliLiter(s) Oral Mucosa every 12 hours  chlorhexidine 2% Cloths 1 Application(s) Topical <User Schedule>  collagenase Ointment 1 Application(s) Topical two times a day  heparin   Injectable 5000 Unit(s) SubCutaneous every 8 hours  insulin glargine Injectable (LANTUS) 20 Unit(s) SubCutaneous every morning  insulin glargine Injectable (LANTUS) 20 Unit(s) SubCutaneous at bedtime  insulin lispro (ADMELOG) corrective regimen sliding scale   SubCutaneous every 6 hours  insulin lispro Injectable (ADMELOG) 4 Unit(s) SubCutaneous every 6 hours  levETIRAcetam  IVPB 1000 milliGRAM(s) IV Intermittent <User Schedule>  polyethylene glycol 3350 17 Gram(s) Oral daily  senna 2 Tablet(s) Oral at bedtime  sodium chloride 3%  Inhalation 4 milliLiter(s) Inhalation every 6 hours  valproic  acid Syrup 500 milliGRAM(s) Enteral Tube every 8 hours    ## O/E:I&O's Summary    02 Dec 2022 07:01  -  03 Dec 2022 07:00  --------------------------------------------------------  IN: 2605 mL / OUT: 600 mL / NET: 2005 mL          DVT PPX hep  ## Code status:  Goals of care discussion: [] yes [ ] no  [x] full code  [ ] DNR  [ ] DNI  [ ] JOSÉ MANUEL

## 2022-12-04 ENCOUNTER — TRANSCRIPTION ENCOUNTER (OUTPATIENT)
Age: 72
End: 2022-12-04

## 2022-12-04 LAB
ALBUMIN SERPL ELPH-MCNC: 1.6 G/DL — LOW (ref 3.3–5)
ALP SERPL-CCNC: 116 U/L — SIGNIFICANT CHANGE UP (ref 40–120)
ALT FLD-CCNC: 53 U/L — SIGNIFICANT CHANGE UP (ref 12–78)
ANION GAP SERPL CALC-SCNC: 3 MMOL/L — LOW (ref 5–17)
AST SERPL-CCNC: 46 U/L — HIGH (ref 15–37)
BASOPHILS # BLD AUTO: 0.09 K/UL — SIGNIFICANT CHANGE UP (ref 0–0.2)
BASOPHILS NFR BLD AUTO: 0.7 % — SIGNIFICANT CHANGE UP (ref 0–2)
BILIRUB SERPL-MCNC: 0.4 MG/DL — SIGNIFICANT CHANGE UP (ref 0.2–1.2)
BUN SERPL-MCNC: 23 MG/DL — SIGNIFICANT CHANGE UP (ref 7–23)
CALCIUM SERPL-MCNC: 8 MG/DL — LOW (ref 8.5–10.1)
CHLORIDE SERPL-SCNC: 104 MMOL/L — SIGNIFICANT CHANGE UP (ref 96–108)
CO2 SERPL-SCNC: 31 MMOL/L — SIGNIFICANT CHANGE UP (ref 22–31)
CREAT SERPL-MCNC: 0.76 MG/DL — SIGNIFICANT CHANGE UP (ref 0.5–1.3)
EGFR: 94 ML/MIN/1.73M2 — SIGNIFICANT CHANGE UP
EOSINOPHIL # BLD AUTO: 0.32 K/UL — SIGNIFICANT CHANGE UP (ref 0–0.5)
EOSINOPHIL NFR BLD AUTO: 2.6 % — SIGNIFICANT CHANGE UP (ref 0–6)
GLUCOSE BLDC GLUCOMTR-MCNC: 117 MG/DL — HIGH (ref 70–99)
GLUCOSE BLDC GLUCOMTR-MCNC: 123 MG/DL — HIGH (ref 70–99)
GLUCOSE BLDC GLUCOMTR-MCNC: 90 MG/DL — SIGNIFICANT CHANGE UP (ref 70–99)
GLUCOSE SERPL-MCNC: 113 MG/DL — HIGH (ref 70–99)
HCT VFR BLD CALC: 29.8 % — LOW (ref 39–50)
HGB BLD-MCNC: 9.7 G/DL — LOW (ref 13–17)
IMM GRANULOCYTES NFR BLD AUTO: 5.6 % — HIGH (ref 0–0.9)
LYMPHOCYTES # BLD AUTO: 16.3 % — SIGNIFICANT CHANGE UP (ref 13–44)
LYMPHOCYTES # BLD AUTO: 2.02 K/UL — SIGNIFICANT CHANGE UP (ref 1–3.3)
MAGNESIUM SERPL-MCNC: 2.2 MG/DL — SIGNIFICANT CHANGE UP (ref 1.6–2.6)
MCHC RBC-ENTMCNC: 30.9 PG — SIGNIFICANT CHANGE UP (ref 27–34)
MCHC RBC-ENTMCNC: 32.6 G/DL — SIGNIFICANT CHANGE UP (ref 32–36)
MCV RBC AUTO: 94.9 FL — SIGNIFICANT CHANGE UP (ref 80–100)
MONOCYTES # BLD AUTO: 1.28 K/UL — HIGH (ref 0–0.9)
MONOCYTES NFR BLD AUTO: 10.3 % — SIGNIFICANT CHANGE UP (ref 2–14)
NEUTROPHILS # BLD AUTO: 7.99 K/UL — HIGH (ref 1.8–7.4)
NEUTROPHILS NFR BLD AUTO: 64.5 % — SIGNIFICANT CHANGE UP (ref 43–77)
NRBC # BLD: 0 /100 WBCS — SIGNIFICANT CHANGE UP (ref 0–0)
PHOSPHATE SERPL-MCNC: 2.3 MG/DL — LOW (ref 2.5–4.5)
PLATELET # BLD AUTO: 347 K/UL — SIGNIFICANT CHANGE UP (ref 150–400)
POTASSIUM SERPL-MCNC: 5 MMOL/L — SIGNIFICANT CHANGE UP (ref 3.5–5.3)
POTASSIUM SERPL-SCNC: 5 MMOL/L — SIGNIFICANT CHANGE UP (ref 3.5–5.3)
PROT SERPL-MCNC: 5.5 GM/DL — LOW (ref 6–8.3)
RBC # BLD: 3.14 M/UL — LOW (ref 4.2–5.8)
RBC # FLD: 14.1 % — SIGNIFICANT CHANGE UP (ref 10.3–14.5)
SODIUM SERPL-SCNC: 138 MMOL/L — SIGNIFICANT CHANGE UP (ref 135–145)
WBC # BLD: 12.4 K/UL — HIGH (ref 3.8–10.5)
WBC # FLD AUTO: 12.4 K/UL — HIGH (ref 3.8–10.5)

## 2022-12-04 PROCEDURE — 93970 EXTREMITY STUDY: CPT | Mod: 26

## 2022-12-04 PROCEDURE — 99233 SBSQ HOSP IP/OBS HIGH 50: CPT

## 2022-12-04 RX ORDER — ACETAMINOPHEN 500 MG
650 TABLET ORAL ONCE
Refills: 0 | Status: COMPLETED | OUTPATIENT
Start: 2022-12-04 | End: 2022-12-04

## 2022-12-04 RX ORDER — ACETAMINOPHEN 500 MG
650 TABLET ORAL EVERY 6 HOURS
Refills: 0 | Status: DISCONTINUED | OUTPATIENT
Start: 2022-12-04 | End: 2023-02-08

## 2022-12-04 RX ADMIN — SODIUM CHLORIDE 4 MILLILITER(S): 9 INJECTION INTRAMUSCULAR; INTRAVENOUS; SUBCUTANEOUS at 05:12

## 2022-12-04 RX ADMIN — HEPARIN SODIUM 5000 UNIT(S): 5000 INJECTION INTRAVENOUS; SUBCUTANEOUS at 06:04

## 2022-12-04 RX ADMIN — HEPARIN SODIUM 5000 UNIT(S): 5000 INJECTION INTRAVENOUS; SUBCUTANEOUS at 23:00

## 2022-12-04 RX ADMIN — Medication 650 MILLIGRAM(S): at 06:38

## 2022-12-04 RX ADMIN — Medication 62.5 MILLIMOLE(S): at 06:06

## 2022-12-04 RX ADMIN — Medication 3 MILLILITER(S): at 17:29

## 2022-12-04 RX ADMIN — CHLORHEXIDINE GLUCONATE 15 MILLILITER(S): 213 SOLUTION TOPICAL at 17:23

## 2022-12-04 RX ADMIN — Medication 1 APPLICATION(S): at 06:05

## 2022-12-04 RX ADMIN — HEPARIN SODIUM 5000 UNIT(S): 5000 INJECTION INTRAVENOUS; SUBCUTANEOUS at 13:13

## 2022-12-04 RX ADMIN — Medication 500 MILLIGRAM(S): at 06:04

## 2022-12-04 RX ADMIN — Medication 4 UNIT(S): at 11:49

## 2022-12-04 RX ADMIN — CHLORHEXIDINE GLUCONATE 15 MILLILITER(S): 213 SOLUTION TOPICAL at 06:04

## 2022-12-04 RX ADMIN — Medication 3 MILLILITER(S): at 11:35

## 2022-12-04 RX ADMIN — LEVETIRACETAM 400 MILLIGRAM(S): 250 TABLET, FILM COATED ORAL at 13:13

## 2022-12-04 RX ADMIN — Medication 650 MILLIGRAM(S): at 06:03

## 2022-12-04 RX ADMIN — LEVETIRACETAM 400 MILLIGRAM(S): 250 TABLET, FILM COATED ORAL at 06:04

## 2022-12-04 RX ADMIN — INSULIN GLARGINE 20 UNIT(S): 100 INJECTION, SOLUTION SUBCUTANEOUS at 07:34

## 2022-12-04 RX ADMIN — LEVETIRACETAM 400 MILLIGRAM(S): 250 TABLET, FILM COATED ORAL at 23:00

## 2022-12-04 RX ADMIN — Medication 1 APPLICATION(S): at 17:40

## 2022-12-04 RX ADMIN — CHLORHEXIDINE GLUCONATE 1 APPLICATION(S): 213 SOLUTION TOPICAL at 04:08

## 2022-12-04 RX ADMIN — Medication 500 MILLIGRAM(S): at 23:11

## 2022-12-04 RX ADMIN — Medication 500 MILLIGRAM(S): at 13:15

## 2022-12-04 RX ADMIN — SODIUM CHLORIDE 4 MILLILITER(S): 9 INJECTION INTRAMUSCULAR; INTRAVENOUS; SUBCUTANEOUS at 17:29

## 2022-12-04 RX ADMIN — CEFEPIME 100 MILLIGRAM(S): 1 INJECTION, POWDER, FOR SOLUTION INTRAMUSCULAR; INTRAVENOUS at 13:13

## 2022-12-04 RX ADMIN — CEFEPIME 100 MILLIGRAM(S): 1 INJECTION, POWDER, FOR SOLUTION INTRAMUSCULAR; INTRAVENOUS at 23:00

## 2022-12-04 RX ADMIN — CEFEPIME 100 MILLIGRAM(S): 1 INJECTION, POWDER, FOR SOLUTION INTRAMUSCULAR; INTRAVENOUS at 06:05

## 2022-12-04 RX ADMIN — SODIUM CHLORIDE 4 MILLILITER(S): 9 INJECTION INTRAMUSCULAR; INTRAVENOUS; SUBCUTANEOUS at 11:36

## 2022-12-04 RX ADMIN — Medication 3 MILLILITER(S): at 05:11

## 2022-12-04 RX ADMIN — Medication 81 MILLIGRAM(S): at 11:50

## 2022-12-04 NOTE — CHART NOTE - NSCHARTNOTEFT_GEN_A_CORE
Surgery team notified ACP that Trach/Peg will not be done secondary to fever and patient is relatively unstable. will reschedule on tuesday. attending notified.

## 2022-12-04 NOTE — PROGRESS NOTE ADULT - SUBJECTIVE AND OBJECTIVE BOX
CC: Patient is a 72y old  Male who presents with a chief complaint of s/p cardiac arrest, hypoglycemia (03 Dec 2022 11:21)      ## HPI:HPI:  Mr. Hamilton is a 72 year old male with a PMH of lacunar infarct, CHF (EF ~40% per son), CAD s/p PCI with stent and CABGx3 (~2020), s/p PPM (medtronic), HTN, COPD, DMII on insulin and PO meds, BPH, and CKD (stage II?) presenting to ICU s/p cardiac arrest in ED. Per patient son he has recently been doing well and in his normal state of health, however, yesterday Mr. Hamilton started c/o low blood sugars on glucometer. Pt reportedly continued to take his home medications, including both oral antidiabetic agents and insulin (both long and short acting). Today his son found him minimally responsive hanging off his bed, checked his glucose and it read "low," so he called 911. In ED pt was noted to be hypothermic with glucose noted to be 70 just prior to PEA arrest. Pt was coded for ~5 minutes with 2 epi given with ROSC. Pt intubated at that time. is moving all extremities equally and with reactive, equal pupils. He remained hypothermic and is not requiring vasopressor support. Pending CT Scans. ROS otherwise negative per son besides hypoglycemia, with no known sick contacts. Pt accepted to ICU for further management / care.  (14 Nov 2022 01:33)      O/N: no events    ## ROS:  denies complaints  ## EXAM  ICU Vital Signs Last 24 Hrs  T(C): 37.1 (04 Dec 2022 12:00), Max: 38.2 (04 Dec 2022 04:30)  T(F): 98.7 (04 Dec 2022 12:00), Max: 100.8 (04 Dec 2022 04:30)  HR: 67 (04 Dec 2022 12:00) (67 - 90)  BP: 113/45 (04 Dec 2022 12:00) (92/52 - 127/94)  BP(mean): 61 (04 Dec 2022 12:00) (57 - 102)  ABP: --  ABP(mean): --  RR: 15 (04 Dec 2022 12:00) (12 - 27)  SpO2: 100% (04 Dec 2022 12:00) (90% - 100%)    O2 Parameters below as of 04 Dec 2022 11:35  Patient On (Oxygen Delivery Method): ventilator          CON : NAD  EENT : EOMI, MMM  NECK : Full ROM  RESP : CTAB no increased WOB  CARD : rrr no m/r/g  ABD : S NT ND NABS no rebound  EXT : No edema  NEURO : AAOX3   ## Labs:  Lab Results:  CBC  CBC Full  -  ( 04 Dec 2022 03:10 )  WBC Count : 12.40 K/uL  RBC Count : 3.14 M/uL  Hemoglobin : 9.7 g/dL  Hematocrit : 29.8 %  Platelet Count - Automated : 347 K/uL  Mean Cell Volume : 94.9 fl  Mean Cell Hemoglobin : 30.9 pg  Mean Cell Hemoglobin Concentration : 32.6 g/dL  Auto Neutrophil # : 7.99 K/uL  Auto Lymphocyte # : 2.02 K/uL  Auto Monocyte # : 1.28 K/uL  Auto Eosinophil # : 0.32 K/uL  Auto Basophil # : 0.09 K/uL  Auto Neutrophil % : 64.5 %  Auto Lymphocyte % : 16.3 %  Auto Monocyte % : 10.3 %  Auto Eosinophil % : 2.6 %  Auto Basophil % : 0.7 %    .		Differential:	[] Automated		[] Manual  Chemistry                        9.7    12.40 )-----------( 347      ( 04 Dec 2022 03:10 )             29.8     12-04    138  |  104  |  23  ----------------------------<  113<H>  5.0   |  31  |  0.76    Ca    8.0<L>      04 Dec 2022 03:10  Phos  2.3     12-04  Mg     2.2     12-04    TPro  5.5<L>  /  Alb  1.6<L>  /  TBili  0.4  /  DBili  x   /  AST  46<H>  /  ALT  53  /  AlkPhos  116  12-04    LIVER FUNCTIONS - ( 04 Dec 2022 03:10 )  Alb: 1.6 g/dL / Pro: 5.5 gm/dL / ALK PHOS: 116 U/L / ALT: 53 U/L / AST: 46 U/L / GGT: x                 ABG - ( 03 Dec 2022 13:37 )  pH, Arterial: 7.49  pH, Blood: x     /  pCO2: 46    /  pO2: 101   / HCO3: 35    / Base Excess: 10.6  /  SaO2: 99.2          MICROBIOLOGY/CULTURES:      RADIOLOGY RESULTS:        ## Medications:  MEDICATIONS  (STANDING):  albuterol/ipratropium for Nebulization 3 milliLiter(s) Nebulizer every 6 hours  aspirin  chewable 81 milliGRAM(s) Oral daily  cefepime   IVPB      cefepime   IVPB 1000 milliGRAM(s) IV Intermittent every 8 hours  chlorhexidine 0.12% Liquid 15 milliLiter(s) Oral Mucosa every 12 hours  chlorhexidine 2% Cloths 1 Application(s) Topical <User Schedule>  collagenase Ointment 1 Application(s) Topical two times a day  heparin   Injectable 5000 Unit(s) SubCutaneous every 8 hours  insulin glargine Injectable (LANTUS) 20 Unit(s) SubCutaneous every morning  insulin glargine Injectable (LANTUS) 20 Unit(s) SubCutaneous at bedtime  insulin lispro (ADMELOG) corrective regimen sliding scale   SubCutaneous every 6 hours  insulin lispro Injectable (ADMELOG) 4 Unit(s) SubCutaneous every 6 hours  levETIRAcetam  IVPB 1000 milliGRAM(s) IV Intermittent <User Schedule>  polyethylene glycol 3350 17 Gram(s) Oral daily  senna 2 Tablet(s) Oral at bedtime  sodium chloride 3%  Inhalation 4 milliLiter(s) Inhalation every 6 hours  valproic  acid Syrup 500 milliGRAM(s) Enteral Tube every 8 hours    ## O/E:I&O's Summary    03 Dec 2022 07:01  -  04 Dec 2022 07:00  --------------------------------------------------------  IN: 3275 mL / OUT: 570 mL / NET: 2705 mL    04 Dec 2022 07:01  -  04 Dec 2022 12:20  --------------------------------------------------------  IN: 325 mL / OUT: 0 mL / NET: 325 mL        POCUS :   DVT PPX  ## Code status:  Goals of care discussion: [] yes [ ] no  [x] full code  [ ] DNR  [ ] DNI  [ ] DANITZAST

## 2022-12-04 NOTE — PROGRESS NOTE ADULT - ASSESSMENT
73 y/o M w/CAD s/p CABG, HFpEF, COPD, CKD and prior CVA admitted following hypoglycemic episode and cardiac arrest. Acute respiratory failure with hypoxia and hypercapnia secondary to arrest. Takotsubo cardiomyopathy found on Echo. Seizures likely secondary to anoxic brain injury from cardiac arrest. ELMER likely ATN. Shock liver. E. Coli UTI. Trach/PEG planned for Wed 11/30. Patient remains medically optimized for planned tracheostomy and PEG.    - Wean AEDs as per neuro  - Daily SAT/SBT, awaiting trach/PEG  - Monitor off pressors  - Complete course of abx (8/10)  - DVT prophylaxis  - Full code

## 2022-12-04 NOTE — PROGRESS NOTE ADULT - SUBJECTIVE AND OBJECTIVE BOX
Patient seen and examined at bedside, patient intubated on ventilator support  Unable to participate in interview due to current condition    MEDICATIONS  (STANDING):  albuterol/ipratropium for Nebulization 3 milliLiter(s) Nebulizer every 6 hours  aspirin  chewable 81 milliGRAM(s) Oral daily  cefepime   IVPB      cefepime   IVPB 1000 milliGRAM(s) IV Intermittent every 8 hours  chlorhexidine 0.12% Liquid 15 milliLiter(s) Oral Mucosa every 12 hours  chlorhexidine 2% Cloths 1 Application(s) Topical <User Schedule>  collagenase Ointment 1 Application(s) Topical two times a day  heparin   Injectable 5000 Unit(s) SubCutaneous every 8 hours  insulin glargine Injectable (LANTUS) 20 Unit(s) SubCutaneous every morning  insulin glargine Injectable (LANTUS) 20 Unit(s) SubCutaneous at bedtime  insulin lispro (ADMELOG) corrective regimen sliding scale   SubCutaneous every 6 hours  insulin lispro Injectable (ADMELOG) 4 Unit(s) SubCutaneous every 6 hours  levETIRAcetam  IVPB 1000 milliGRAM(s) IV Intermittent <User Schedule>  polyethylene glycol 3350 17 Gram(s) Oral daily  senna 2 Tablet(s) Oral at bedtime  sodium chloride 3%  Inhalation 4 milliLiter(s) Inhalation every 6 hours  valproic  acid Syrup 500 milliGRAM(s) Enteral Tube every 8 hours    MEDICATIONS  (PRN):  acetaminophen     Tablet .. 650 milliGRAM(s) Oral every 6 hours PRN Temp greater or equal to 38C (100.4F), Mild Pain (1 - 3)      Vital Signs Last 24 Hrs  T(C): 36.9 (04 Dec 2022 15:00), Max: 38.2 (04 Dec 2022 04:30)  T(F): 98.4 (04 Dec 2022 15:00), Max: 100.8 (04 Dec 2022 04:30)  HR: 85 (04 Dec 2022 16:12) (67 - 90)  BP: 108/69 (04 Dec 2022 16:12) (92/52 - 127/94)  BP(mean): 77 (04 Dec 2022 16:12) (57 - 102)  RR: 15 (04 Dec 2022 16:12) (12 - 26)  SpO2: 100% (04 Dec 2022 16:12) (90% - 100%)    Parameters below as of 04 Dec 2022 11:35  Patient On (Oxygen Delivery Method): ventilator      PHYSICAL EXAM:  General: NAD, intubated on ventilator support  Neuro:  Alert and responsive  HEENT: OGT and ETT in place, NCAT, EOMI, conjunctiva clear  CV: +S1+S2 regular rate and rhythm  Lung: clear to ausculation bilaterally, respirations nonlabored, good inspiratory effort  Abdomen: soft, NTND. Normoactive BS  Extremities: no pedal edema or calf tenderness noted     LABS:                        9.7    12.40 )-----------( 347      ( 04 Dec 2022 03:10 )             29.8     12-04    138  |  104  |  23  ----------------------------<  113<H>  5.0   |  31  |  0.76    Ca    8.0<L>      04 Dec 2022 03:10  Phos  2.3     12-04  Mg     2.2     12-04    TPro  5.5<L>  /  Alb  1.6<L>  /  TBili  0.4  /  DBili  x   /  AST  46<H>  /  ALT  53  /  AlkPhos  116  12-04

## 2022-12-04 NOTE — PROGRESS NOTE ADULT - ASSESSMENT
72 year old male intubated on ventilator support, unable to be extubated. Surgery consulted for tracheostomy/PEG, procedure delayed due to fevers and worsening leukocytosis.  PMH of lacunar infarct, CHF (EF ~40% per son), CAD s/p PCI with stent and CABGx3 (~2020), s/p PPM (medtronic), HTN, COPD, DMII on insulin and PO meds, BPH, and CKD (stage II?) presenting to ICU s/p cardiac arrest. Admitted to ICU for post-arrest mgmt, course complicated by status epilepticus, ELMER on CKD and encephalopathy and continued fevers.    Plan:  Will reschedule procedure for Teusday  Will continue to monitor  Continue management per ICU  Discussed with ICU and Dr. Arce

## 2022-12-05 LAB
ALBUMIN SERPL ELPH-MCNC: 0.1 G/DL — LOW (ref 3.3–5)
ALBUMIN SERPL ELPH-MCNC: 1.6 G/DL — LOW (ref 3.3–5)
ALP SERPL-CCNC: 117 U/L — SIGNIFICANT CHANGE UP (ref 40–120)
ALP SERPL-CCNC: 122 U/L — HIGH (ref 40–120)
ALT FLD-CCNC: 41 U/L — SIGNIFICANT CHANGE UP (ref 12–78)
ALT FLD-CCNC: 43 U/L — SIGNIFICANT CHANGE UP (ref 12–78)
ANION GAP SERPL CALC-SCNC: 3 MMOL/L — LOW (ref 5–17)
ANION GAP SERPL CALC-SCNC: 5 MMOL/L — SIGNIFICANT CHANGE UP (ref 5–17)
APTT BLD: 25.2 SEC — LOW (ref 27.5–35.5)
AST SERPL-CCNC: 21 U/L — SIGNIFICANT CHANGE UP (ref 15–37)
AST SERPL-CCNC: 26 U/L — SIGNIFICANT CHANGE UP (ref 15–37)
BILIRUB SERPL-MCNC: 0.2 MG/DL — SIGNIFICANT CHANGE UP (ref 0.2–1.2)
BILIRUB SERPL-MCNC: 0.3 MG/DL — SIGNIFICANT CHANGE UP (ref 0.2–1.2)
BLD GP AB SCN SERPL QL: SIGNIFICANT CHANGE UP
BUN SERPL-MCNC: 21 MG/DL — SIGNIFICANT CHANGE UP (ref 7–23)
BUN SERPL-MCNC: 26 MG/DL — HIGH (ref 7–23)
CALCIUM SERPL-MCNC: 8.3 MG/DL — LOW (ref 8.5–10.1)
CALCIUM SERPL-MCNC: 8.4 MG/DL — LOW (ref 8.5–10.1)
CHLORIDE SERPL-SCNC: 102 MMOL/L — SIGNIFICANT CHANGE UP (ref 96–108)
CHLORIDE SERPL-SCNC: 105 MMOL/L — SIGNIFICANT CHANGE UP (ref 96–108)
CO2 SERPL-SCNC: 30 MMOL/L — SIGNIFICANT CHANGE UP (ref 22–31)
CO2 SERPL-SCNC: 32 MMOL/L — HIGH (ref 22–31)
CREAT SERPL-MCNC: 0.71 MG/DL — SIGNIFICANT CHANGE UP (ref 0.5–1.3)
CREAT SERPL-MCNC: 0.74 MG/DL — SIGNIFICANT CHANGE UP (ref 0.5–1.3)
EGFR: 95 ML/MIN/1.73M2 — SIGNIFICANT CHANGE UP
EGFR: 96 ML/MIN/1.73M2 — SIGNIFICANT CHANGE UP
FLUAV AG NPH QL: SIGNIFICANT CHANGE UP
FLUBV AG NPH QL: SIGNIFICANT CHANGE UP
GLUCOSE BLDC GLUCOMTR-MCNC: 102 MG/DL — HIGH (ref 70–99)
GLUCOSE BLDC GLUCOMTR-MCNC: 104 MG/DL — HIGH (ref 70–99)
GLUCOSE BLDC GLUCOMTR-MCNC: 142 MG/DL — HIGH (ref 70–99)
GLUCOSE BLDC GLUCOMTR-MCNC: 145 MG/DL — HIGH (ref 70–99)
GLUCOSE BLDC GLUCOMTR-MCNC: 170 MG/DL — HIGH (ref 70–99)
GLUCOSE SERPL-MCNC: 150 MG/DL — HIGH (ref 70–99)
GLUCOSE SERPL-MCNC: 165 MG/DL — HIGH (ref 70–99)
HCT VFR BLD CALC: 30.4 % — LOW (ref 39–50)
HCT VFR BLD CALC: 30.4 % — LOW (ref 39–50)
HGB BLD-MCNC: 9.7 G/DL — LOW (ref 13–17)
HGB BLD-MCNC: 9.8 G/DL — LOW (ref 13–17)
INR BLD: 0.99 RATIO — SIGNIFICANT CHANGE UP (ref 0.88–1.16)
MAGNESIUM SERPL-MCNC: 2.2 MG/DL — SIGNIFICANT CHANGE UP (ref 1.6–2.6)
MAGNESIUM SERPL-MCNC: 2.2 MG/DL — SIGNIFICANT CHANGE UP (ref 1.6–2.6)
MCHC RBC-ENTMCNC: 30.5 PG — SIGNIFICANT CHANGE UP (ref 27–34)
MCHC RBC-ENTMCNC: 30.7 PG — SIGNIFICANT CHANGE UP (ref 27–34)
MCHC RBC-ENTMCNC: 31.9 G/DL — LOW (ref 32–36)
MCHC RBC-ENTMCNC: 32.2 G/DL — SIGNIFICANT CHANGE UP (ref 32–36)
MCV RBC AUTO: 94.7 FL — SIGNIFICANT CHANGE UP (ref 80–100)
MCV RBC AUTO: 96.2 FL — SIGNIFICANT CHANGE UP (ref 80–100)
NRBC # BLD: 0 /100 WBCS — SIGNIFICANT CHANGE UP (ref 0–0)
NRBC # BLD: 0 /100 WBCS — SIGNIFICANT CHANGE UP (ref 0–0)
PHOSPHATE SERPL-MCNC: 2.9 MG/DL — SIGNIFICANT CHANGE UP (ref 2.5–4.5)
PHOSPHATE SERPL-MCNC: 3.2 MG/DL — SIGNIFICANT CHANGE UP (ref 2.5–4.5)
PLATELET # BLD AUTO: 318 K/UL — SIGNIFICANT CHANGE UP (ref 150–400)
PLATELET # BLD AUTO: 339 K/UL — SIGNIFICANT CHANGE UP (ref 150–400)
POTASSIUM SERPL-MCNC: 4.4 MMOL/L — SIGNIFICANT CHANGE UP (ref 3.5–5.3)
POTASSIUM SERPL-MCNC: 4.5 MMOL/L — SIGNIFICANT CHANGE UP (ref 3.5–5.3)
POTASSIUM SERPL-SCNC: 4.4 MMOL/L — SIGNIFICANT CHANGE UP (ref 3.5–5.3)
POTASSIUM SERPL-SCNC: 4.5 MMOL/L — SIGNIFICANT CHANGE UP (ref 3.5–5.3)
PROCALCITONIN SERPL-MCNC: 0.1 NG/ML — SIGNIFICANT CHANGE UP (ref 0.02–0.1)
PROT SERPL-MCNC: 5.4 GM/DL — LOW (ref 6–8.3)
PROT SERPL-MCNC: 5.5 GM/DL — LOW (ref 6–8.3)
PROTHROM AB SERPL-ACNC: 11.9 SEC — SIGNIFICANT CHANGE UP (ref 10.5–13.4)
RBC # BLD: 3.16 M/UL — LOW (ref 4.2–5.8)
RBC # BLD: 3.21 M/UL — LOW (ref 4.2–5.8)
RBC # FLD: 13.8 % — SIGNIFICANT CHANGE UP (ref 10.3–14.5)
RBC # FLD: 13.9 % — SIGNIFICANT CHANGE UP (ref 10.3–14.5)
SARS-COV-2 RNA SPEC QL NAA+PROBE: SIGNIFICANT CHANGE UP
SODIUM SERPL-SCNC: 137 MMOL/L — SIGNIFICANT CHANGE UP (ref 135–145)
SODIUM SERPL-SCNC: 140 MMOL/L — SIGNIFICANT CHANGE UP (ref 135–145)
WBC # BLD: 11.93 K/UL — HIGH (ref 3.8–10.5)
WBC # BLD: 12.67 K/UL — HIGH (ref 3.8–10.5)
WBC # FLD AUTO: 11.93 K/UL — HIGH (ref 3.8–10.5)
WBC # FLD AUTO: 12.67 K/UL — HIGH (ref 3.8–10.5)

## 2022-12-05 PROCEDURE — 99291 CRITICAL CARE FIRST HOUR: CPT

## 2022-12-05 PROCEDURE — 43246 EGD PLACE GASTROSTOMY TUBE: CPT

## 2022-12-05 PROCEDURE — 99232 SBSQ HOSP IP/OBS MODERATE 35: CPT | Mod: 25,57

## 2022-12-05 PROCEDURE — 31600 PLANNED TRACHEOSTOMY: CPT

## 2022-12-05 RX ORDER — DEXMEDETOMIDINE HYDROCHLORIDE IN 0.9% SODIUM CHLORIDE 4 UG/ML
0.2 INJECTION INTRAVENOUS
Qty: 200 | Refills: 0 | Status: DISCONTINUED | OUTPATIENT
Start: 2022-12-05 | End: 2022-12-06

## 2022-12-05 RX ORDER — MIDAZOLAM HYDROCHLORIDE 1 MG/ML
4 INJECTION, SOLUTION INTRAMUSCULAR; INTRAVENOUS ONCE
Refills: 0 | Status: DISCONTINUED | OUTPATIENT
Start: 2022-12-05 | End: 2022-12-05

## 2022-12-05 RX ORDER — PROPOFOL 10 MG/ML
10 INJECTION, EMULSION INTRAVENOUS
Qty: 1000 | Refills: 0 | Status: DISCONTINUED | OUTPATIENT
Start: 2022-12-05 | End: 2022-12-05

## 2022-12-05 RX ORDER — VALPROIC ACID (AS SODIUM SALT) 250 MG/5ML
500 SOLUTION, ORAL ORAL ONCE
Refills: 0 | Status: COMPLETED | OUTPATIENT
Start: 2022-12-05 | End: 2022-12-05

## 2022-12-05 RX ORDER — FENTANYL CITRATE 50 UG/ML
50 INJECTION INTRAVENOUS EVERY 4 HOURS
Refills: 0 | Status: DISCONTINUED | OUTPATIENT
Start: 2022-12-05 | End: 2022-12-10

## 2022-12-05 RX ADMIN — Medication 3 MILLILITER(S): at 00:55

## 2022-12-05 RX ADMIN — FENTANYL CITRATE 50 MICROGRAM(S): 50 INJECTION INTRAVENOUS at 22:47

## 2022-12-05 RX ADMIN — Medication 500 MILLIGRAM(S): at 05:38

## 2022-12-05 RX ADMIN — HEPARIN SODIUM 5000 UNIT(S): 5000 INJECTION INTRAVENOUS; SUBCUTANEOUS at 05:38

## 2022-12-05 RX ADMIN — CEFEPIME 100 MILLIGRAM(S): 1 INJECTION, POWDER, FOR SOLUTION INTRAMUSCULAR; INTRAVENOUS at 05:38

## 2022-12-05 RX ADMIN — Medication 2: at 05:37

## 2022-12-05 RX ADMIN — FENTANYL CITRATE 50 MICROGRAM(S): 50 INJECTION INTRAVENOUS at 22:33

## 2022-12-05 RX ADMIN — Medication 1 APPLICATION(S): at 18:30

## 2022-12-05 RX ADMIN — HEPARIN SODIUM 5000 UNIT(S): 5000 INJECTION INTRAVENOUS; SUBCUTANEOUS at 22:13

## 2022-12-05 RX ADMIN — INSULIN GLARGINE 20 UNIT(S): 100 INJECTION, SOLUTION SUBCUTANEOUS at 08:31

## 2022-12-05 RX ADMIN — CHLORHEXIDINE GLUCONATE 1 APPLICATION(S): 213 SOLUTION TOPICAL at 00:30

## 2022-12-05 RX ADMIN — SODIUM CHLORIDE 4 MILLILITER(S): 9 INJECTION INTRAMUSCULAR; INTRAVENOUS; SUBCUTANEOUS at 00:44

## 2022-12-05 RX ADMIN — Medication 55 MILLIGRAM(S): at 22:41

## 2022-12-05 RX ADMIN — CEFEPIME 100 MILLIGRAM(S): 1 INJECTION, POWDER, FOR SOLUTION INTRAMUSCULAR; INTRAVENOUS at 14:46

## 2022-12-05 RX ADMIN — MIDAZOLAM HYDROCHLORIDE 4 MILLIGRAM(S): 1 INJECTION, SOLUTION INTRAMUSCULAR; INTRAVENOUS at 01:56

## 2022-12-05 RX ADMIN — DEXMEDETOMIDINE HYDROCHLORIDE IN 0.9% SODIUM CHLORIDE 4.31 MICROGRAM(S)/KG/HR: 4 INJECTION INTRAVENOUS at 22:08

## 2022-12-05 RX ADMIN — Medication 1 APPLICATION(S): at 05:39

## 2022-12-05 RX ADMIN — LEVETIRACETAM 400 MILLIGRAM(S): 250 TABLET, FILM COATED ORAL at 05:38

## 2022-12-05 RX ADMIN — CHLORHEXIDINE GLUCONATE 15 MILLILITER(S): 213 SOLUTION TOPICAL at 05:38

## 2022-12-05 RX ADMIN — LEVETIRACETAM 400 MILLIGRAM(S): 250 TABLET, FILM COATED ORAL at 14:46

## 2022-12-05 RX ADMIN — PROPOFOL 5.17 MICROGRAM(S)/KG/MIN: 10 INJECTION, EMULSION INTRAVENOUS at 17:57

## 2022-12-05 RX ADMIN — CEFEPIME 100 MILLIGRAM(S): 1 INJECTION, POWDER, FOR SOLUTION INTRAMUSCULAR; INTRAVENOUS at 22:09

## 2022-12-05 RX ADMIN — CHLORHEXIDINE GLUCONATE 15 MILLILITER(S): 213 SOLUTION TOPICAL at 18:31

## 2022-12-05 RX ADMIN — LEVETIRACETAM 400 MILLIGRAM(S): 250 TABLET, FILM COATED ORAL at 22:09

## 2022-12-05 NOTE — PROGRESS NOTE ADULT - SUBJECTIVE AND OBJECTIVE BOX
Team Surgery Preop Note    Patient is a 72y old Male who presents of s/p cardiac arrest, with secondary encephalopathy for trach/PEG placement.     Diagnosis: Cardiac arrest, encephalopathy  Procedure: Tracheostomy, PEG insertion  Surgeon: Dr. Arce                          9.8    11.93 )-----------( 339      ( 05 Dec 2022 04:15 )             30.4     12-05    137  |  102  |  21  ----------------------------<  165<H>  4.5   |  32<H>  |  0.71    Ca    8.3<L>      05 Dec 2022 04:15  Phos  2.9     12-05  Mg     2.2     12-05    TPro  5.4<L>  /  Alb  1.6<L>  /  TBili  0.2  /  DBili  x   /  AST  26  /  ALT  41  /  AlkPhos  117  12-05    [ ] Type & Screen  [ ] CBC  [ ] BMP  [ ] PT/PTT/INR  [ ] Urinalysis  [ ] Chest X-ray  [ ] EKG  [ ] NPO/IVF  [ ] Consent  [ ] Clearance  [ ] Added on to OR Schedule  [ ] Anti-coagulation held  [ ] MRSA/MSSA Nasal Screen    Team Surgery Preop Note    Patient is a 72y old Male who presents of s/p cardiac arrest, with secondary encephalopathy for trach/PEG placement.     Diagnosis: Cardiac arrest, encephalopathy  Procedure: Tracheostomy, PEG insertion  Surgeon: Dr. Arce                          9.8    11.93 )-----------( 339      ( 05 Dec 2022 04:15 )             30.4     12-05    137  |  102  |  21  ----------------------------<  165<H>  4.5   |  32<H>  |  0.71    Ca    8.3<L>      05 Dec 2022 04:15  Phos  2.9     12-05  Mg     2.2     12-05    TPro  5.4<L>  /  Alb  1.6<L>  /  TBili  0.2  /  DBili  x   /  AST  26  /  ALT  41  /  AlkPhos  117  12-05    Type & Screen: in lab  PT/PTT/INR: in lab  COVID: pending  MRSA/MSSA Nasal Screen: Not detected

## 2022-12-05 NOTE — PROGRESS NOTE ADULT - SUBJECTIVE AND OBJECTIVE BOX
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 Unknown how long pt unresponsive for at home. Blood sugar in the 40s with EMS s/p glucagon. On arrival to ER pt hypothermic and agonally breathing. Pt became unresponsive with loss of pulse; ACLS initiated. PEA arrest with ROSC after approximately 5 min s/p Epi x2.  Pt intubated during CODE BLUE. Per son pt on the day prior to presentation reported low blood sugar reads on his glucometer in the range of 80s. Son believes pt continued to take his insulin and oral diabetic regimen. Son states that pt was eating but may have been eating less in the last few days. Later on pt noted to have tonic-clonic Sz. ICU dx: 1) Hypoglycemia 2) PEA arrest 3) Takotsubo cardiomyopathy found on Echo 4) New onset Sz either 2 to hypoglycemia vs anoxic injury 5) ELMER 2 to ATN 6) Shock liver      ## Labs:  CBC:                        9.7    12.67 )-----------( 318      ( 05 Dec 2022 12:06 )             30.4     Chem:  12-    140  |  105  |  26<H>  ----------------------------<  150<H>  4.4   |  30  |  0.74    Ca    8.4<L>      05 Dec 2022 12:06  Phos  3.2     12-05  Mg     2.2     12-05    TPro  5.5<L>  /  Alb  0.1<L>  /  TBili  0.3  /  DBili  x   /  AST  21  /  ALT  43  /  AlkPhos  122<H>  12-05    Coags:  PT/INR - ( 05 Dec 2022 12:06 )   PT: 11.9 sec;   INR: 0.99 ratio         PTT - ( 05 Dec 2022 12:06 )  PTT:25.2 sec        ## Imaging:    ## Medications:  cefepime   IVPB      cefepime   IVPB 1000 milliGRAM(s) IV Intermittent every 8 hours        insulin glargine Injectable (LANTUS) 20 Unit(s) SubCutaneous every morning  insulin lispro (ADMELOG) corrective regimen sliding scale   SubCutaneous every 6 hours    aspirin  chewable 81 milliGRAM(s) Oral daily  heparin   Injectable 5000 Unit(s) SubCutaneous every 8 hours    polyethylene glycol 3350 17 Gram(s) Oral daily  senna 2 Tablet(s) Oral at bedtime    acetaminophen     Tablet .. 650 milliGRAM(s) Oral every 6 hours PRN  dexMEDEtomidine Infusion 0.2 MICROgram(s)/kG/Hr IV Continuous <Continuous>  fentaNYL    Injectable 50 MICROGram(s) IV Push every 4 hours PRN  levETIRAcetam  IVPB 1000 milliGRAM(s) IV Intermittent <User Schedule>  propofol Infusion 10 MICROgram(s)/kG/Min IV Continuous <Continuous>  valproate sodium  IVPB 500 milliGRAM(s) IV Intermittent once  valproic  acid Syrup 500 milliGRAM(s) Enteral Tube every 8 hours      ## Vitals:  T(C): 36.4 (22 @ 19:09), Max: 37.7 (22 @ 07:00)  HR: 87 (22 @ 19:32) (70 - 102)  BP: 105/63 (22 @ 19:00) (105/56 - 202/85)  BP(mean): 74 (22 @ 19:00) (68 - 123)  RR: 14 (22 @ 19:32) (11 - 35)  SpO2: 100% (22 @ 19:32) (95% - 100%)  Wt(kg): --  Vent: Mode: AC/ CMV (Assist Control/ Continuous Mandatory Ventilation), RR (machine): 14, RR (patient): 15, TV (machine): 550, FiO2: 30, PEEP: 5, PIP: 30  AB-04 @ 07:01  -   @ 07:00  --------------------------------------------------------  IN: 1815 mL / OUT: 201 mL / NET: 1614 mL     @ 07: @ 20:48  --------------------------------------------------------  IN: 395.8 mL / OUT: 300 mL / NET: 95.8 mL          ## P/E:  Gen: lying comfortably in bed in no apparent distress  Mouth: (+) ETT  Lungs: Coarse BS  Heart: Paced  Abd: Soft/+BS/ Non-tender  Ext: UE edema  Neuro: Tracking with eyes and more awake/alert    CENTRAL LINE: [ ] YES [ ] NO  LOCATION:   DATE INSERTED:  REMOVE: [ ] YES [ ] NO      LE: [ ] YES [ ] NO    DATE INSERTED:  REMOVE:  [ ] YES [ ] NO      A-LINE:  [ ] YES [ ] NO  LOCATION:   DATE INSERTED:  REMOVE:  [ ] YES [ ] NO  EXPLAIN:      CODE STATUS: [x ] full code  [ ] DNR  [ ] DNI  [ ] [facility]  Goals of care discussion: [ ] yes

## 2022-12-05 NOTE — PROGRESS NOTE ADULT - SUBJECTIVE AND OBJECTIVE BOX
Post-op check    S/P tracheostomy/PEG POD#0  Pt seen and examined at bedside resting comfortably in ICU.    Vital Signs Last 24 Hrs  T(F): 97.6 (12-05-22 @ 19:09), Max: 99.8 (12-05-22 @ 07:00)  HR: 71 (12-05-22 @ 21:00)  BP: 136/63 (12-05-22 @ 21:00)  RR: 14 (12-05-22 @ 21:00)  SpO2: 100% (12-05-22 @ 21:00)  POCT Blood Glucose.: 102 mg/dL (05 Dec 2022 18:03)      CONSTITUTIONAL: NAD  RESPIRATORY: Tracheostomy clean/dry/intact, functioning well. Clear to auscultation bilaterally, respirations nonlabored  CARDIOVASCULAR: S1S2, Regular rate and rhythm  GASTROINTESTINAL: PEG C/D/I, Nondistended, soft, nontender  MUSCULOSKELETAL: no calf tenderness, No edema      Assessment: 72M S/P tracheostomy/PEG POD#0    Plan:   - PEG to gravity x 24 hours  - Tracheostomy care  - continue current management per ICU

## 2022-12-05 NOTE — PROGRESS NOTE ADULT - ASSESSMENT
Left message for Shannon to return call to clinic.    72y old Male who presents of s/p cardiac arrest, with secondary encephalopathy for trach/PEG placement.     - NPO/ IVF, tubes feeds stopped at 11:30 am  - Consented  - Medically cleared by ICU team for procedure

## 2022-12-05 NOTE — PROGRESS NOTE ADULT - ASSESSMENT
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 Unknown how long pt unresponsive for at home. Blood sugar in the 40s with EMS s/p glucagon. On arrival to ER pt hypothermic and agonally breathing. Pt became unresponsive with loss of pulse; ACLS initiated. PEA arrest with ROSC after approximately 5 min s/p Epi x2.  Pt intubated during CODE BLUE. Per son pt on the day prior to presentation reported low blood sugar reads on his glucometer in the range of 80s. Son believes pt continued to take his insulin and oral diabetic regimen. Son states that pt was eating but may have been eating less in the last few days. Later on pt noted to have tonic-clonic Sz. ICU dx: 1) Hypoglycemia 2) PEA arrest 3) Takotsubo cardiomyopathy found on Echo 4) New onset Sz either 2 to hypoglycemia vs anoxic injury 5) ELMER 2 to ATN 6) Shock liver     Resp/GI: Trach/PEG scheduled for today  ID: Finish course of Cefepime  Heme: DVT prophylaxis with sq Heparin   FEN: NPO for the procedure  Endo: Adjust Lantus + Lispro to FS  Renal: Follow BUN/Cr and UO  Neuro: MS has improved/ Valproic + Keppra as per Neuro  Social: Son at bedside

## 2022-12-05 NOTE — BRIEF OPERATIVE NOTE - OPERATION/FINDINGS
s/p PEG with skin bumper at 4cm at the level of the skin. 6fr cuffed open tracheostomy performed. upper and lower trach ties in place, not to be removed

## 2022-12-05 NOTE — BRIEF OPERATIVE NOTE - ELECTIVE PROCEDURE
No PT called wanting to discuss with Mercedes directly some concerns she had about the medication she was just put on.

## 2022-12-06 LAB
ALBUMIN SERPL ELPH-MCNC: 1.6 G/DL — LOW (ref 3.3–5)
ALP SERPL-CCNC: 102 U/L — SIGNIFICANT CHANGE UP (ref 40–120)
ALT FLD-CCNC: 33 U/L — SIGNIFICANT CHANGE UP (ref 12–78)
ANION GAP SERPL CALC-SCNC: 2 MMOL/L — LOW (ref 5–17)
AST SERPL-CCNC: 19 U/L — SIGNIFICANT CHANGE UP (ref 15–37)
BILIRUB SERPL-MCNC: 0.4 MG/DL — SIGNIFICANT CHANGE UP (ref 0.2–1.2)
BUN SERPL-MCNC: 19 MG/DL — SIGNIFICANT CHANGE UP (ref 7–23)
CALCIUM SERPL-MCNC: 8.1 MG/DL — LOW (ref 8.5–10.1)
CHLORIDE SERPL-SCNC: 107 MMOL/L — SIGNIFICANT CHANGE UP (ref 96–108)
CO2 SERPL-SCNC: 31 MMOL/L — SIGNIFICANT CHANGE UP (ref 22–31)
CREAT SERPL-MCNC: 0.73 MG/DL — SIGNIFICANT CHANGE UP (ref 0.5–1.3)
EGFR: 95 ML/MIN/1.73M2 — SIGNIFICANT CHANGE UP
GLUCOSE BLDC GLUCOMTR-MCNC: 106 MG/DL — HIGH (ref 70–99)
GLUCOSE BLDC GLUCOMTR-MCNC: 116 MG/DL — HIGH (ref 70–99)
GLUCOSE BLDC GLUCOMTR-MCNC: 122 MG/DL — HIGH (ref 70–99)
GLUCOSE BLDC GLUCOMTR-MCNC: 125 MG/DL — HIGH (ref 70–99)
GLUCOSE BLDC GLUCOMTR-MCNC: 174 MG/DL — HIGH (ref 70–99)
GLUCOSE SERPL-MCNC: 102 MG/DL — HIGH (ref 70–99)
HCT VFR BLD CALC: 31.4 % — LOW (ref 39–50)
HGB BLD-MCNC: 10 G/DL — LOW (ref 13–17)
MAGNESIUM SERPL-MCNC: 2.3 MG/DL — SIGNIFICANT CHANGE UP (ref 1.6–2.6)
MCHC RBC-ENTMCNC: 30.8 PG — SIGNIFICANT CHANGE UP (ref 27–34)
MCHC RBC-ENTMCNC: 31.8 G/DL — LOW (ref 32–36)
MCV RBC AUTO: 96.6 FL — SIGNIFICANT CHANGE UP (ref 80–100)
NRBC # BLD: 0 /100 WBCS — SIGNIFICANT CHANGE UP (ref 0–0)
PHOSPHATE SERPL-MCNC: 2.8 MG/DL — SIGNIFICANT CHANGE UP (ref 2.5–4.5)
PLATELET # BLD AUTO: 327 K/UL — SIGNIFICANT CHANGE UP (ref 150–400)
POTASSIUM SERPL-MCNC: 4.2 MMOL/L — SIGNIFICANT CHANGE UP (ref 3.5–5.3)
POTASSIUM SERPL-SCNC: 4.2 MMOL/L — SIGNIFICANT CHANGE UP (ref 3.5–5.3)
PROT SERPL-MCNC: 5.6 GM/DL — LOW (ref 6–8.3)
RBC # BLD: 3.25 M/UL — LOW (ref 4.2–5.8)
RBC # FLD: 14 % — SIGNIFICANT CHANGE UP (ref 10.3–14.5)
SODIUM SERPL-SCNC: 140 MMOL/L — SIGNIFICANT CHANGE UP (ref 135–145)
WBC # BLD: 12.3 K/UL — HIGH (ref 3.8–10.5)
WBC # FLD AUTO: 12.3 K/UL — HIGH (ref 3.8–10.5)

## 2022-12-06 PROCEDURE — 71045 X-RAY EXAM CHEST 1 VIEW: CPT | Mod: 26

## 2022-12-06 PROCEDURE — 99291 CRITICAL CARE FIRST HOUR: CPT

## 2022-12-06 RX ORDER — SODIUM CHLORIDE 9 MG/ML
500 INJECTION, SOLUTION INTRAVENOUS ONCE
Refills: 0 | Status: COMPLETED | OUTPATIENT
Start: 2022-12-06 | End: 2022-12-06

## 2022-12-06 RX ORDER — VALPROIC ACID (AS SODIUM SALT) 250 MG/5ML
500 SOLUTION, ORAL ORAL ONCE
Refills: 0 | Status: COMPLETED | OUTPATIENT
Start: 2022-12-06 | End: 2022-12-06

## 2022-12-06 RX ADMIN — HEPARIN SODIUM 5000 UNIT(S): 5000 INJECTION INTRAVENOUS; SUBCUTANEOUS at 06:30

## 2022-12-06 RX ADMIN — Medication 81 MILLIGRAM(S): at 12:58

## 2022-12-06 RX ADMIN — CHLORHEXIDINE GLUCONATE 15 MILLILITER(S): 213 SOLUTION TOPICAL at 18:01

## 2022-12-06 RX ADMIN — CHLORHEXIDINE GLUCONATE 1 APPLICATION(S): 213 SOLUTION TOPICAL at 04:30

## 2022-12-06 RX ADMIN — Medication 500 MILLIGRAM(S): at 21:19

## 2022-12-06 RX ADMIN — Medication 500 MILLIGRAM(S): at 14:42

## 2022-12-06 RX ADMIN — CEFEPIME 100 MILLIGRAM(S): 1 INJECTION, POWDER, FOR SOLUTION INTRAMUSCULAR; INTRAVENOUS at 06:29

## 2022-12-06 RX ADMIN — HEPARIN SODIUM 5000 UNIT(S): 5000 INJECTION INTRAVENOUS; SUBCUTANEOUS at 21:19

## 2022-12-06 RX ADMIN — Medication 1 APPLICATION(S): at 06:28

## 2022-12-06 RX ADMIN — Medication 55 MILLIGRAM(S): at 07:03

## 2022-12-06 RX ADMIN — SODIUM CHLORIDE 1000 MILLILITER(S): 9 INJECTION, SOLUTION INTRAVENOUS at 02:11

## 2022-12-06 RX ADMIN — LEVETIRACETAM 400 MILLIGRAM(S): 250 TABLET, FILM COATED ORAL at 14:40

## 2022-12-06 RX ADMIN — Medication 1 APPLICATION(S): at 18:01

## 2022-12-06 RX ADMIN — CHLORHEXIDINE GLUCONATE 15 MILLILITER(S): 213 SOLUTION TOPICAL at 06:28

## 2022-12-06 RX ADMIN — LEVETIRACETAM 400 MILLIGRAM(S): 250 TABLET, FILM COATED ORAL at 21:19

## 2022-12-06 RX ADMIN — SENNA PLUS 2 TABLET(S): 8.6 TABLET ORAL at 21:18

## 2022-12-06 RX ADMIN — Medication 2: at 23:09

## 2022-12-06 RX ADMIN — LEVETIRACETAM 400 MILLIGRAM(S): 250 TABLET, FILM COATED ORAL at 06:27

## 2022-12-06 RX ADMIN — HEPARIN SODIUM 5000 UNIT(S): 5000 INJECTION INTRAVENOUS; SUBCUTANEOUS at 14:41

## 2022-12-06 NOTE — PHARMACOTHERAPY INTERVENTION NOTE - COMMENTS
Spoke to PA and suggested Keppra 750 q 12h based on their renal function crcl=40ml/min. PA wants to continue order as is.
Patient on day 11 of abx, recommended to dc cefepime.

## 2022-12-06 NOTE — PROGRESS NOTE ADULT - SUBJECTIVE AND OBJECTIVE BOX
SURGERY PROGRESS HPI:  S/P tracheostomy/PEG POD#1  Pt seen and examined at bedside resting comfortably in ICU.    Vital Signs Last 24 Hrs  T(C): 37.9 (06 Dec 2022 04:30), Max: 37.9 (06 Dec 2022 04:30)  T(F): 100.2 (06 Dec 2022 04:30), Max: 100.2 (06 Dec 2022 04:30)  HR: 94 (06 Dec 2022 04:30) (61 - 102)  BP: 117/82 (06 Dec 2022 04:30) (77/48 - 202/85)  BP(mean): 90 (06 Dec 2022 04:30) (54 - 123)  RR: 19 (06 Dec 2022 04:30) (11 - 35)  SpO2: 99% (06 Dec 2022 04:30) (92% - 100%)        PHYSICAL EXAM:  CONSTITUTIONAL: NAD  RESPIRATORY: Tracheostomy clean/dry/intact, functioning well. Clear to auscultation bilaterally, respirations nonlabored  CARDIOVASCULAR: S1S2, Regular rate and rhythm  GASTROINTESTINAL: PEG C/D/I, Nondistended, soft, nontender  MUSCULOSKELETAL: no calf tenderness, No edema    I&O's Detail    04 Dec 2022 07:01  -  05 Dec 2022 07:00  --------------------------------------------------------  IN:    Enteral Tube Flush: 100 mL    Glucerna: 1265 mL    IV PiggyBack: 100 mL    IV PiggyBack: 350 mL  Total IN: 1815 mL    OUT:    Incontinent per Condom Catheter (mL): 201 mL  Total OUT: 201 mL    Total NET: 1614 mL      05 Dec 2022 07:01  -  06 Dec 2022 04:50  --------------------------------------------------------  IN:    Dexmedetomidine: 17.2 mL    Glucerna: 220 mL    IV PiggyBack: 250 mL    IV PiggyBack: 100 mL    Lactated Ringers Bolus: 500 mL    Propofol: 64.5 mL  Total IN: 1151.7 mL    OUT:    Incontinent per Condom Catheter (mL): 300 mL    Stool (mL): 1 mL    Voided (mL): 400 mL  Total OUT: 701 mL    Total NET: 450.7 mL          LABS:                        10.0   12.30 )-----------( 327      ( 06 Dec 2022 03:10 )             31.4     12-06    140  |  107  |  19  ----------------------------<  102<H>  4.2   |  31  |  0.73    Ca    8.1<L>      06 Dec 2022 03:10  Phos  2.8     12-06  Mg     2.3     12-06    TPro  5.6<L>  /  Alb  1.6<L>  /  TBili  0.4  /  DBili  x   /  AST  19  /  ALT  33  /  AlkPhos  102  12-06    PT/INR - ( 05 Dec 2022 12:06 )   PT: 11.9 sec;   INR: 0.99 ratio      PTT - ( 05 Dec 2022 12:06 )  PTT:25.2 sec      Assessment: 72M S/P tracheostomy/PEG POD#1    Plan:   - PEG to gravity x 24 hours  - Tracheostomy care  - continue current management per ICU

## 2022-12-06 NOTE — PROGRESS NOTE ADULT - SUBJECTIVE AND OBJECTIVE BOX
HPI:  Mr. Hamilton is a 72 year old male with a PMH of lacunar infarct, CHF (EF ~40% per son), CAD s/p PCI with stent and CABGx3 (~), s/p PPM (medtronic), HTN, COPD, DMII on insulin and PO meds, BPH, and CKD (stage II?) presenting to ICU s/p cardiac arrest in ED. Per patient son he has recently been doing well and in his normal state of health, however, yesterday Mr. Hamilton started c/o low blood sugars on glucometer. Pt reportedly continued to take his home medications, including both oral antidiabetic agents and insulin (both long and short acting). Today his son found him minimally responsive hanging off his bed, checked his glucose and it read "low," so he called 911. In ED pt was noted to be hypothermic with glucose noted to be 70 just prior to PEA arrest. Pt was coded for ~5 minutes with 2 epi given with ROSC. Pt intubated at that time. is moving all extremities equally and with reactive, equal pupils. He remained hypothermic and is not requiring vasopressor support. Pending CT Scans. ROS otherwise negative per son besides hypoglycemia, with no known sick contacts. Pt accepted to ICU for further management / care.  (2022 01:33)      24 hr events:      ## Labs:  CBC:                        10.0   12.30 )-----------( 327      ( 06 Dec 2022 03:10 )             31.4     Chem:  12-06    140  |  107  |  19  ----------------------------<  102<H>  4.2   |  31  |  0.73    Ca    8.1<L>      06 Dec 2022 03:10  Phos  2.8     12-06  Mg     2.3     12-06    TPro  5.6<L>  /  Alb  1.6<L>  /  TBili  0.4  /  DBili  x   /  AST  19  /  ALT  33  /  AlkPhos  102  12-06    Coags:  PT/INR - ( 05 Dec 2022 12:06 )   PT: 11.9 sec;   INR: 0.99 ratio         PTT - ( 05 Dec 2022 12:06 )  PTT:25.2 sec        ## Imaging:    ## Medications:        insulin glargine Injectable (LANTUS) 20 Unit(s) SubCutaneous every morning  insulin lispro (ADMELOG) corrective regimen sliding scale   SubCutaneous every 6 hours    aspirin  chewable 81 milliGRAM(s) Oral daily  heparin   Injectable 5000 Unit(s) SubCutaneous every 8 hours    polyethylene glycol 3350 17 Gram(s) Oral daily  senna 2 Tablet(s) Oral at bedtime    acetaminophen     Tablet .. 650 milliGRAM(s) Oral every 6 hours PRN  fentaNYL    Injectable 50 MICROGram(s) IV Push every 4 hours PRN  levETIRAcetam  IVPB 1000 milliGRAM(s) IV Intermittent <User Schedule>  valproic  acid Syrup 500 milliGRAM(s) Enteral Tube every 8 hours      ## Vitals:  T(C): 36.9 (22 @ 23:27), Max: 38 (22 @ 17:00)  HR: 101 (22 @ 23:00) (61 - 106)  BP: 97/55 (22 @ 23:00) (78/46 - 144/70)  BP(mean): 65 (22 @ 23:00) (54 - 90)  RR: 16 (22 @ 23:00) (14 - 25)  SpO2: 100% (22 @ 23:00) (92% - 100%)  Wt(kg): --  Vent: Mode: AC/ CMV (Assist Control/ Continuous Mandatory Ventilation), RR (machine): 14, RR (patient): 16, TV (machine): 450, FiO2: 30, PEEP: 5, PIP: 27  AB-05 @ 07:  -   @ 07:00  --------------------------------------------------------  IN: 1351.7 mL / OUT: 901 mL / NET: 450.7 mL     @ 07:01  -   @ 23:31  --------------------------------------------------------  IN: 440 mL / OUT: 0 mL / NET: 440 mL          ## P/E:  Gen: lying comfortably in bed in no apparent distress  Neck: (+) trach  Lungs: Decreased BS  Heart: RRR  Abd: Soft/+BS/ Non-tender  Ext: UE edema  Neuro:     CENTRAL LINE: [ ] YES [ ] NO  LOCATION:   DATE INSERTED:  REMOVE: [ ] YES [ ] NO      LUJAN: [ ] YES [ ] NO    DATE INSERTED:  REMOVE:  [ ] YES [ ] NO      A-LINE:  [ ] YES [ ] NO  LOCATION:   DATE INSERTED:  REMOVE:  [ ] YES [ ] NO  EXPLAIN:      CODE STATUS: [x ] full code  [ ] DNR  [ ] DNI  [ ] MOLST  Goals of care discussion: [ ] yes    HPI:  Pt is a 73 yo M with h/o COPD, CAD s/p PCI with stent  and CABG , chronic diastolic heart failure (EF 50%), 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 Unknown how long pt unresponsive for at home. Blood sugar in the 40s with EMS s/p glucagon. On arrival to ER pt hypothermic and agonally breathing. Pt became unresponsive with loss of pulse; ACLS initiated. PEA arrest with ROSC after approximately 5 min s/p Epi x2.  Pt intubated during CODE BLUE. Per son pt on the day prior to presentation reported low blood sugar reads on his glucometer in the range of 80s. Son believes pt continued to take his insulin and oral diabetic regimen. Son states that pt was eating but may have been eating less in the last few days. Later on pt noted to have tonic-clonic Sz. ICU dx: 1) Hypoglycemia 2) PEA arrest 3) Takotsubo cardiomyopathy found on Echo 4) New onset Sz either 2 to hypoglycemia vs anoxic injury 5) ELMER 2 to ATN 6) Shock liver. Pt with improvement in MS. s/p trach/PEG on       ## Labs:  CBC:                        10.0   12.30 )-----------( 327      ( 06 Dec 2022 03:10 )             31.4     Chem:  12-    140  |  107  |  19  ----------------------------<  102<H>  4.2   |  31  |  0.73    Ca    8.1<L>      06 Dec 2022 03:10  Phos  2.8     12-06  Mg     2.3     12-06    TPro  5.6<L>  /  Alb  1.6<L>  /  TBili  0.4  /  DBili  x   /  AST  19  /  ALT  33  /  AlkPhos  102  12-06    Coags:  PT/INR - ( 05 Dec 2022 12:06 )   PT: 11.9 sec;   INR: 0.99 ratio         PTT - ( 05 Dec 2022 12:06 )  PTT:25.2 sec        ## Imaging:    ## Medications:        insulin glargine Injectable (LANTUS) 20 Unit(s) SubCutaneous every morning  insulin lispro (ADMELOG) corrective regimen sliding scale   SubCutaneous every 6 hours    aspirin  chewable 81 milliGRAM(s) Oral daily  heparin   Injectable 5000 Unit(s) SubCutaneous every 8 hours    polyethylene glycol 3350 17 Gram(s) Oral daily  senna 2 Tablet(s) Oral at bedtime    acetaminophen     Tablet .. 650 milliGRAM(s) Oral every 6 hours PRN  fentaNYL    Injectable 50 MICROGram(s) IV Push every 4 hours PRN  levETIRAcetam  IVPB 1000 milliGRAM(s) IV Intermittent <User Schedule>  valproic  acid Syrup 500 milliGRAM(s) Enteral Tube every 8 hours      ## Vitals:  T(C): 36.9 (22 @ 23:27), Max: 38 (22 @ 17:00)  HR: 101 (22 @ 23:00) (61 - 106)  BP: 97/55 (22 @ 23:00) (78/46 - 144/70)  BP(mean): 65 (22 @ 23:00) (54 - 90)  RR: 16 (22 @ 23:00) (14 - 25)  SpO2: 100% (22 23:00) (92% - 100%)  Wt(kg): --  Vent: Mode: AC/ CMV (Assist Control/ Continuous Mandatory Ventilation), RR (machine): 14, RR (patient): 16, TV (machine): 450, FiO2: 30, PEEP: 5, PIP: 27  AB-05 @ 07:01  -   @ 07:00  --------------------------------------------------------  IN: 1351.7 mL / OUT: 901 mL / NET: 450.7 mL     @ 07:01  -   @ 23:31  --------------------------------------------------------  IN: 440 mL / OUT: 0 mL / NET: 440 mL          ## P/E:  Gen: lying comfortably in bed in no apparent distress  Neck: (+) trach  Lungs: Decreased BS  Heart: RRR  Abd: Soft/+BS/ Non-tender  Ext: UE edema  Neuro: Awake, following commands    CENTRAL LINE: [ ] YES [ ] NO  LOCATION:   DATE INSERTED:  REMOVE: [ ] YES [ ] NO      LUJAN: [ ] YES [ ] NO    DATE INSERTED:  REMOVE:  [ ] YES [ ] NO      A-LINE:  [ ] YES [ ] NO  LOCATION:   DATE INSERTED:  REMOVE:  [ ] YES [ ] NO  EXPLAIN:      CODE STATUS: [x ] full code  [ ] DNR  [ ] DNI  [ ] MOLST  Goals of care discussion: [ ] yes

## 2022-12-06 NOTE — PROGRESS NOTE ADULT - ASSESSMENT
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 Unknown how long pt unresponsive for at home. Blood sugar in the 40s with EMS s/p glucagon. On arrival to ER pt hypothermic and agonally breathing. Pt became unresponsive with loss of pulse; ACLS initiated. PEA arrest with ROSC after approximately 5 min s/p Epi x2.  Pt intubated during CODE BLUE. Per son pt on the day prior to presentation reported low blood sugar reads on his glucometer in the range of 80s. Son believes pt continued to take his insulin and oral diabetic regimen. Son states that pt was eating but may have been eating less in the last few days. Later on pt noted to have tonic-clonic Sz. ICU dx: 1) Hypoglycemia 2) PEA arrest 3) Takotsubo cardiomyopathy found on Echo 4) New onset Sz either 2 to hypoglycemia vs anoxic injury 5) ELMER 2 to ATN 6) Shock liver. Pt with improvement in MS. s/p trach/PEG on 12/5    Resp: Begin daily SBT  ID: s/p Cefepime  Heme: DVT prophylaxis with sq Heparin   FEN: May start enteral feeds  Endo: Adjust Lantus + Lispro to FS  Renal: Follow BUN/Cr and UO  Neuro: MS has improved/ Valproic + Keppra as per Neuro  Social: Son at bedside and updated/ Aggressive PT/OT

## 2022-12-07 LAB
ALBUMIN SERPL ELPH-MCNC: 1.7 G/DL — LOW (ref 3.3–5)
ALP SERPL-CCNC: 102 U/L — SIGNIFICANT CHANGE UP (ref 40–120)
ALT FLD-CCNC: 29 U/L — SIGNIFICANT CHANGE UP (ref 12–78)
ANION GAP SERPL CALC-SCNC: 3 MMOL/L — LOW (ref 5–17)
AST SERPL-CCNC: 19 U/L — SIGNIFICANT CHANGE UP (ref 15–37)
BASOPHILS # BLD AUTO: 0.09 K/UL — SIGNIFICANT CHANGE UP (ref 0–0.2)
BASOPHILS NFR BLD AUTO: 0.6 % — SIGNIFICANT CHANGE UP (ref 0–2)
BILIRUB SERPL-MCNC: 0.4 MG/DL — SIGNIFICANT CHANGE UP (ref 0.2–1.2)
BUN SERPL-MCNC: 20 MG/DL — SIGNIFICANT CHANGE UP (ref 7–23)
CALCIUM SERPL-MCNC: 8.4 MG/DL — LOW (ref 8.5–10.1)
CHLORIDE SERPL-SCNC: 106 MMOL/L — SIGNIFICANT CHANGE UP (ref 96–108)
CO2 SERPL-SCNC: 31 MMOL/L — SIGNIFICANT CHANGE UP (ref 22–31)
CREAT SERPL-MCNC: 0.79 MG/DL — SIGNIFICANT CHANGE UP (ref 0.5–1.3)
EGFR: 93 ML/MIN/1.73M2 — SIGNIFICANT CHANGE UP
EOSINOPHIL # BLD AUTO: 0.42 K/UL — SIGNIFICANT CHANGE UP (ref 0–0.5)
EOSINOPHIL NFR BLD AUTO: 2.7 % — SIGNIFICANT CHANGE UP (ref 0–6)
GLUCOSE BLDC GLUCOMTR-MCNC: 137 MG/DL — HIGH (ref 70–99)
GLUCOSE BLDC GLUCOMTR-MCNC: 179 MG/DL — HIGH (ref 70–99)
GLUCOSE BLDC GLUCOMTR-MCNC: 180 MG/DL — HIGH (ref 70–99)
GLUCOSE BLDC GLUCOMTR-MCNC: 181 MG/DL — HIGH (ref 70–99)
GLUCOSE SERPL-MCNC: 147 MG/DL — HIGH (ref 70–99)
HCT VFR BLD CALC: 30.2 % — LOW (ref 39–50)
HGB BLD-MCNC: 9.6 G/DL — LOW (ref 13–17)
IMM GRANULOCYTES NFR BLD AUTO: 2 % — HIGH (ref 0–0.9)
LYMPHOCYTES # BLD AUTO: 1.8 K/UL — SIGNIFICANT CHANGE UP (ref 1–3.3)
LYMPHOCYTES # BLD AUTO: 11.4 % — LOW (ref 13–44)
MAGNESIUM SERPL-MCNC: 2.1 MG/DL — SIGNIFICANT CHANGE UP (ref 1.6–2.6)
MCHC RBC-ENTMCNC: 30.4 PG — SIGNIFICANT CHANGE UP (ref 27–34)
MCHC RBC-ENTMCNC: 31.8 G/DL — LOW (ref 32–36)
MCV RBC AUTO: 95.6 FL — SIGNIFICANT CHANGE UP (ref 80–100)
MONOCYTES # BLD AUTO: 1.55 K/UL — HIGH (ref 0–0.9)
MONOCYTES NFR BLD AUTO: 9.8 % — SIGNIFICANT CHANGE UP (ref 2–14)
NEUTROPHILS # BLD AUTO: 11.62 K/UL — HIGH (ref 1.8–7.4)
NEUTROPHILS NFR BLD AUTO: 73.5 % — SIGNIFICANT CHANGE UP (ref 43–77)
NRBC # BLD: 0 /100 WBCS — SIGNIFICANT CHANGE UP (ref 0–0)
PHOSPHATE SERPL-MCNC: 2.6 MG/DL — SIGNIFICANT CHANGE UP (ref 2.5–4.5)
PLATELET # BLD AUTO: 308 K/UL — SIGNIFICANT CHANGE UP (ref 150–400)
POTASSIUM SERPL-MCNC: 4.1 MMOL/L — SIGNIFICANT CHANGE UP (ref 3.5–5.3)
POTASSIUM SERPL-SCNC: 4.1 MMOL/L — SIGNIFICANT CHANGE UP (ref 3.5–5.3)
PROT SERPL-MCNC: 5.6 GM/DL — LOW (ref 6–8.3)
RBC # BLD: 3.16 M/UL — LOW (ref 4.2–5.8)
RBC # FLD: 14.2 % — SIGNIFICANT CHANGE UP (ref 10.3–14.5)
SODIUM SERPL-SCNC: 140 MMOL/L — SIGNIFICANT CHANGE UP (ref 135–145)
WBC # BLD: 15.8 K/UL — HIGH (ref 3.8–10.5)
WBC # FLD AUTO: 15.8 K/UL — HIGH (ref 3.8–10.5)

## 2022-12-07 PROCEDURE — 99291 CRITICAL CARE FIRST HOUR: CPT

## 2022-12-07 RX ORDER — MIDODRINE HYDROCHLORIDE 2.5 MG/1
10 TABLET ORAL ONCE
Refills: 0 | Status: COMPLETED | OUTPATIENT
Start: 2022-12-07 | End: 2022-12-07

## 2022-12-07 RX ORDER — SODIUM CHLORIDE 9 MG/ML
1000 INJECTION INTRAMUSCULAR; INTRAVENOUS; SUBCUTANEOUS ONCE
Refills: 0 | Status: COMPLETED | OUTPATIENT
Start: 2022-12-07 | End: 2022-12-07

## 2022-12-07 RX ADMIN — LEVETIRACETAM 400 MILLIGRAM(S): 250 TABLET, FILM COATED ORAL at 21:58

## 2022-12-07 RX ADMIN — HEPARIN SODIUM 5000 UNIT(S): 5000 INJECTION INTRAVENOUS; SUBCUTANEOUS at 14:56

## 2022-12-07 RX ADMIN — HEPARIN SODIUM 5000 UNIT(S): 5000 INJECTION INTRAVENOUS; SUBCUTANEOUS at 21:58

## 2022-12-07 RX ADMIN — SODIUM CHLORIDE 1000 MILLILITER(S): 9 INJECTION INTRAMUSCULAR; INTRAVENOUS; SUBCUTANEOUS at 00:22

## 2022-12-07 RX ADMIN — HEPARIN SODIUM 5000 UNIT(S): 5000 INJECTION INTRAVENOUS; SUBCUTANEOUS at 05:16

## 2022-12-07 RX ADMIN — Medication 1 APPLICATION(S): at 20:00

## 2022-12-07 RX ADMIN — Medication 1 APPLICATION(S): at 18:05

## 2022-12-07 RX ADMIN — LEVETIRACETAM 400 MILLIGRAM(S): 250 TABLET, FILM COATED ORAL at 05:17

## 2022-12-07 RX ADMIN — Medication 500 MILLIGRAM(S): at 21:58

## 2022-12-07 RX ADMIN — CHLORHEXIDINE GLUCONATE 15 MILLILITER(S): 213 SOLUTION TOPICAL at 00:00

## 2022-12-07 RX ADMIN — Medication 2: at 05:16

## 2022-12-07 RX ADMIN — Medication 2: at 12:57

## 2022-12-07 RX ADMIN — CHLORHEXIDINE GLUCONATE 1 APPLICATION(S): 213 SOLUTION TOPICAL at 00:00

## 2022-12-07 RX ADMIN — Medication 81 MILLIGRAM(S): at 13:11

## 2022-12-07 RX ADMIN — INSULIN GLARGINE 20 UNIT(S): 100 INJECTION, SOLUTION SUBCUTANEOUS at 09:19

## 2022-12-07 RX ADMIN — CHLORHEXIDINE GLUCONATE 15 MILLILITER(S): 213 SOLUTION TOPICAL at 18:05

## 2022-12-07 RX ADMIN — Medication 500 MILLIGRAM(S): at 06:16

## 2022-12-07 RX ADMIN — Medication 2: at 23:15

## 2022-12-07 RX ADMIN — LEVETIRACETAM 400 MILLIGRAM(S): 250 TABLET, FILM COATED ORAL at 14:56

## 2022-12-07 RX ADMIN — Medication 1 APPLICATION(S): at 00:00

## 2022-12-07 RX ADMIN — MIDODRINE HYDROCHLORIDE 10 MILLIGRAM(S): 2.5 TABLET ORAL at 06:18

## 2022-12-07 RX ADMIN — Medication 500 MILLIGRAM(S): at 14:57

## 2022-12-07 NOTE — PROGRESS NOTE ADULT - SUBJECTIVE AND OBJECTIVE BOX
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 Unknown how long pt unresponsive for at home. Blood sugar in the 40s with EMS s/p glucagon. On arrival to ER pt hypothermic and agonally breathing. Pt became unresponsive with loss of pulse; ACLS initiated. PEA arrest with ROSC after approximately 5 min s/p Epi x2.  Pt intubated during CODE BLUE. Per son pt on the day prior to presentation reported low blood sugar reads on his glucometer in the range of 80s. Son believes pt continued to take his insulin and oral diabetic regimen. Son states that pt was eating but may have been eating less in the last few days. Later on pt noted to have tonic-clonic Sz. ICU dx: 1) Hypoglycemia 2) PEA arrest 3) Takotsubo cardiomyopathy found on Echo 4) New onset Sz either 2 to hypoglycemia vs anoxic injury 5) ELMER 2 to ATN 6) Shock liver. Pt with improvement in MS. s/p trach/PEG on       ## Labs:  CBC:                        9.6    15.80 )-----------( 308      ( 07 Dec 2022 02:00 )             30.2     Chem:  -    140  |  106  |  20  ----------------------------<  147<H>  4.1   |  31  |  0.79    Ca    8.4<L>      07 Dec 2022 02:00  Phos  2.6     12  Mg     2.1     12    TPro  5.6<L>  /  Alb  1.7<L>  /  TBili  0.4  /  DBili  x   /  AST  19  /  ALT  29  /  AlkPhos  102  12-07    Coags:          ## Imaging:    ## Medications:        insulin glargine Injectable (LANTUS) 20 Unit(s) SubCutaneous every morning  insulin lispro (ADMELOG) corrective regimen sliding scale   SubCutaneous every 6 hours    aspirin  chewable 81 milliGRAM(s) Oral daily  heparin   Injectable 5000 Unit(s) SubCutaneous every 8 hours    polyethylene glycol 3350 17 Gram(s) Oral daily  senna 2 Tablet(s) Oral at bedtime    acetaminophen     Tablet .. 650 milliGRAM(s) Oral every 6 hours PRN  fentaNYL    Injectable 50 MICROGram(s) IV Push every 4 hours PRN  levETIRAcetam  IVPB 1000 milliGRAM(s) IV Intermittent <User Schedule>  valproic  acid Syrup 500 milliGRAM(s) Enteral Tube every 8 hours      ## Vitals:  T(C): 37.6 (22 @ 19:25), Max: 37.6 (22 @ 19:25)  HR: 67 (22 @ 19:00) (66 - 105)  BP: 104/48 (22 @ 19:00) (79/43 - 125/57)  BP(mean): 61 (22 @ 19:00) (52 - 79)  RR: 24 (22 @ :) (14 - 30)  SpO2: 100% (22 @ 19:00) (98% - 100%)  Wt(kg): --  Vent: Mode: AC/ CMV (Assist Control/ Continuous Mandatory Ventilation), RR (machine): 14, RR (patient): 20, TV (machine): 450, FiO2: 30, PEEP: 5, PIP: 17  AB-06 @ 07:01  -   @ 07:00  --------------------------------------------------------  IN: 600 mL / OUT: 0 mL / NET: 600 mL     @ 07:01  -   @ 20:13  --------------------------------------------------------  IN: 520 mL / OUT: 0 mL / NET: 520 mL          ## P/E:  Gen: lying comfortably in bed in no apparent distress  Neck: (+) trach  Lungs: CTA  Heart: RRR  Abd: Soft/+BS/ Non-tender  Ext: UE edema  Neuro: Awake, alert and following commands    CENTRAL LINE: [ ] YES [ ] NO  LOCATION:   DATE INSERTED:  REMOVE: [ ] YES [ ] NO      LUJAN: [ ] YES [ ] NO    DATE INSERTED:  REMOVE:  [ ] YES [ ] NO      A-LINE:  [ ] YES [ ] NO  LOCATION:   DATE INSERTED:  REMOVE:  [ ] YES [ ] NO  EXPLAIN:      CODE STATUS: [x ] full code  [ ] DNR  [ ] DNI  [ ] MOLST  Goals of care discussion: [ ] yes

## 2022-12-08 LAB
ALBUMIN SERPL ELPH-MCNC: 1.5 G/DL — LOW (ref 3.3–5)
ALP SERPL-CCNC: 96 U/L — SIGNIFICANT CHANGE UP (ref 40–120)
ALT FLD-CCNC: 27 U/L — SIGNIFICANT CHANGE UP (ref 12–78)
ANION GAP SERPL CALC-SCNC: 3 MMOL/L — LOW (ref 5–17)
AST SERPL-CCNC: 20 U/L — SIGNIFICANT CHANGE UP (ref 15–37)
BILIRUB SERPL-MCNC: 0.3 MG/DL — SIGNIFICANT CHANGE UP (ref 0.2–1.2)
BUN SERPL-MCNC: 22 MG/DL — SIGNIFICANT CHANGE UP (ref 7–23)
CALCIUM SERPL-MCNC: 8.4 MG/DL — LOW (ref 8.5–10.1)
CHLORIDE SERPL-SCNC: 107 MMOL/L — SIGNIFICANT CHANGE UP (ref 96–108)
CO2 SERPL-SCNC: 30 MMOL/L — SIGNIFICANT CHANGE UP (ref 22–31)
CREAT SERPL-MCNC: 0.69 MG/DL — SIGNIFICANT CHANGE UP (ref 0.5–1.3)
EGFR: 97 ML/MIN/1.73M2 — SIGNIFICANT CHANGE UP
GLUCOSE BLDC GLUCOMTR-MCNC: 127 MG/DL — HIGH (ref 70–99)
GLUCOSE BLDC GLUCOMTR-MCNC: 155 MG/DL — HIGH (ref 70–99)
GLUCOSE BLDC GLUCOMTR-MCNC: 164 MG/DL — HIGH (ref 70–99)
GLUCOSE SERPL-MCNC: 156 MG/DL — HIGH (ref 70–99)
HCT VFR BLD CALC: 29.8 % — LOW (ref 39–50)
HGB BLD-MCNC: 9.3 G/DL — LOW (ref 13–17)
MAGNESIUM SERPL-MCNC: 2.2 MG/DL — SIGNIFICANT CHANGE UP (ref 1.6–2.6)
MCHC RBC-ENTMCNC: 30.7 PG — SIGNIFICANT CHANGE UP (ref 27–34)
MCHC RBC-ENTMCNC: 31.2 G/DL — LOW (ref 32–36)
MCV RBC AUTO: 98.3 FL — SIGNIFICANT CHANGE UP (ref 80–100)
NRBC # BLD: 0 /100 WBCS — SIGNIFICANT CHANGE UP (ref 0–0)
PHOSPHATE SERPL-MCNC: 2.2 MG/DL — LOW (ref 2.5–4.5)
PLATELET # BLD AUTO: 199 K/UL — SIGNIFICANT CHANGE UP (ref 150–400)
POTASSIUM SERPL-MCNC: 4.4 MMOL/L — SIGNIFICANT CHANGE UP (ref 3.5–5.3)
POTASSIUM SERPL-SCNC: 4.4 MMOL/L — SIGNIFICANT CHANGE UP (ref 3.5–5.3)
PROT SERPL-MCNC: 5.6 GM/DL — LOW (ref 6–8.3)
RBC # BLD: 3.03 M/UL — LOW (ref 4.2–5.8)
RBC # FLD: 14.3 % — SIGNIFICANT CHANGE UP (ref 10.3–14.5)
SODIUM SERPL-SCNC: 140 MMOL/L — SIGNIFICANT CHANGE UP (ref 135–145)
WBC # BLD: 9.89 K/UL — SIGNIFICANT CHANGE UP (ref 3.8–10.5)
WBC # FLD AUTO: 9.89 K/UL — SIGNIFICANT CHANGE UP (ref 3.8–10.5)

## 2022-12-08 PROCEDURE — 99291 CRITICAL CARE FIRST HOUR: CPT

## 2022-12-08 RX ORDER — POTASSIUM PHOSPHATE, MONOBASIC POTASSIUM PHOSPHATE, DIBASIC 236; 224 MG/ML; MG/ML
15 INJECTION, SOLUTION INTRAVENOUS ONCE
Refills: 0 | Status: COMPLETED | OUTPATIENT
Start: 2022-12-08 | End: 2022-12-08

## 2022-12-08 RX ADMIN — Medication 500 MILLIGRAM(S): at 15:20

## 2022-12-08 RX ADMIN — CHLORHEXIDINE GLUCONATE 15 MILLILITER(S): 213 SOLUTION TOPICAL at 04:00

## 2022-12-08 RX ADMIN — HEPARIN SODIUM 5000 UNIT(S): 5000 INJECTION INTRAVENOUS; SUBCUTANEOUS at 22:15

## 2022-12-08 RX ADMIN — HEPARIN SODIUM 5000 UNIT(S): 5000 INJECTION INTRAVENOUS; SUBCUTANEOUS at 13:57

## 2022-12-08 RX ADMIN — Medication 1 APPLICATION(S): at 17:40

## 2022-12-08 RX ADMIN — Medication 500 MILLIGRAM(S): at 06:03

## 2022-12-08 RX ADMIN — POTASSIUM PHOSPHATE, MONOBASIC POTASSIUM PHOSPHATE, DIBASIC 62.5 MILLIMOLE(S): 236; 224 INJECTION, SOLUTION INTRAVENOUS at 07:33

## 2022-12-08 RX ADMIN — LEVETIRACETAM 400 MILLIGRAM(S): 250 TABLET, FILM COATED ORAL at 13:57

## 2022-12-08 RX ADMIN — Medication 2: at 11:53

## 2022-12-08 RX ADMIN — Medication 500 MILLIGRAM(S): at 22:15

## 2022-12-08 RX ADMIN — LEVETIRACETAM 400 MILLIGRAM(S): 250 TABLET, FILM COATED ORAL at 22:15

## 2022-12-08 RX ADMIN — HEPARIN SODIUM 5000 UNIT(S): 5000 INJECTION INTRAVENOUS; SUBCUTANEOUS at 06:02

## 2022-12-08 RX ADMIN — CHLORHEXIDINE GLUCONATE 1 APPLICATION(S): 213 SOLUTION TOPICAL at 05:30

## 2022-12-08 RX ADMIN — Medication 2: at 06:01

## 2022-12-08 RX ADMIN — LEVETIRACETAM 400 MILLIGRAM(S): 250 TABLET, FILM COATED ORAL at 06:01

## 2022-12-08 RX ADMIN — Medication 81 MILLIGRAM(S): at 12:49

## 2022-12-08 RX ADMIN — INSULIN GLARGINE 20 UNIT(S): 100 INJECTION, SOLUTION SUBCUTANEOUS at 08:00

## 2022-12-08 RX ADMIN — CHLORHEXIDINE GLUCONATE 15 MILLILITER(S): 213 SOLUTION TOPICAL at 17:40

## 2022-12-08 NOTE — CHART NOTE - NSCHARTNOTEFT_GEN_A_CORE
Assessment:  Pt seen in CCU, trach-> vent, Vital AF 1.2 infusing @ 40 ml/hr via G-Tube.  Pt's son was @ bedside, pt is alert, s/p physical therapy as per son.  Pt s/p trach, PEG on 12/05.  Pt adm c dx of hypoglycemia, cardiac arrest, hypothermia, c acute respiratory failure, seizures, ELMER, shock liver, E. Coli, UTI.  PMH include COPD, CHF, DM( was on Jardiance, Metformin, Victoza pen, Insulin Lispro 10 units, 2 x day, and Insulin Lantus 20 units per medication list provided by son on 11/16), HTN, HLD, CAD, CABG, CKD, CVA, BPH.    Factors impacting intake: [ ] none [ ] nausea  [ ] vomiting [ ] diarrhea [ ] constipation  [ ]chewing problems [ ] swallowing issues  [ x] other: tolerating G-Tube feeding     Diet Prescription: Diet, NPO with Tube Feed:   Tube Feeding Modality: Gastrostomy  Vital AF  Total Volume for 24 Hours (mL): 960  Continuous  Starting Tube Feed Rate {mL per Hour}: 20  Increase Tube Feed Rate by (mL): 10     Every 12 hours  Until Goal Tube Feed Rate (mL per Hour): 40  Tube Feed Duration (in Hours): 24  Tube Feed Start Time: 18:00 (12-06-22 @ 17:48)    Intake: VitalAF1.2 @ 40 ml/hr as tolerated = 1152 Calories, 72 grams protein, 81% RDIs, 779 ml fluid     Current Weight: 12/08, 82.1 kg, 12/06, 89.2 kg, 11/14, 82.2 kg, c wt. fluctuations and wt. loss of 0.1 kg  % Weight Change: 0.1%  12/08, 1+ generalized edema, 2+ right arm, right hand, 3+ edema of left arm and left hand noted  I/O: 1100/0(+1100)->pt c urine output/pt is incontinent per RN    Nutrition focused physical exam conducted; Subcutaneous fat Exam;  [ Mild  ]  Orbital fat pads region,  [ - ]Buccal fat region,  [ Unable, edema noted ]triceps region, [ WNL   ]ribs region.  Muscle Exam; [ Mild  ]temples region, [  Mild ]clavicle region, [ WNL  ]shoulder region, [ - ]Scapula region, [ Unable, edema noted ]Interosseous region, [ WNL  ]thigh region, [ Unable  ]Calf region    Pertinent Medications: MEDICATIONS  (STANDING):  aspirin  chewable 81 milliGRAM(s) Oral daily  chlorhexidine 0.12% Liquid 15 milliLiter(s) Oral Mucosa every 12 hours  chlorhexidine 2% Cloths 1 Application(s) Topical <User Schedule>  collagenase Ointment 1 Application(s) Topical two times a day  heparin   Injectable 5000 Unit(s) SubCutaneous every 8 hours  insulin glargine Injectable (LANTUS) 20 Unit(s) SubCutaneous every morning  insulin lispro (ADMELOG) corrective regimen sliding scale   SubCutaneous every 6 hours  levETIRAcetam  IVPB 1000 milliGRAM(s) IV Intermittent <User Schedule>  polyethylene glycol 3350 17 Gram(s) Oral daily  senna 2 Tablet(s) Oral at bedtime  valproic  acid Syrup 500 milliGRAM(s) Enteral Tube every 8 hours    MEDICATIONS  (PRN):  acetaminophen     Tablet .. 650 milliGRAM(s) Oral every 6 hours PRN Temp greater or equal to 38C (100.4F), Mild Pain (1 - 3)  fentaNYL    Injectable 50 MICROGram(s) IV Push every 4 hours PRN Moderate Pain (4 - 6)    Pertinent Labs: 12-08 Na140 mmol/L Glu 156 mg/dL<H> K+ 4.4 mmol/L Cr  0.69 mg/dL BUN 22 mg/dL 12-08 Phos 2.2 mg/dL<L> 12-08 Alb 1.5 g/dL<L>12-08 ALT 27 U/L AST 20 U/L Alkaline Phosphatase 96 U/L  11/14, A1C=8.8% <H>   CAPILLARY BLOOD GLUCOSE      POCT Blood Glucose.: 155 mg/dL (08 Dec 2022 11:20)  POCT Blood Glucose.: 164 mg/dL (08 Dec 2022 05:59)  POCT Blood Glucose.: 181 mg/dL (07 Dec 2022 23:11)  POCT Blood Glucose.: 137 mg/dL (07 Dec 2022 18:00)    Skin:   12/08, WDL except ecchymotic, pressure ulcer, skin tear noted  11/21, skin tear; left hand noted  12/04, skin tear; right buttock noted  Pressure ulcer  1. 12/02, left ear; healed  2. 12/06, coccyx; stage III    Estimated Needs:   [ x] no change since previous assessment(11/16 for calories and protein)  [ x] recalculated: (11/23  protein needs for improved renal indices & pressure ulcer on 11/23)  IBW: 61.6 kg  Estimated Energy Needs 2260-5233(25-30 Kcal/kg IBW)  Estimated Protein Needs: 74-86g protein/day(1.2-1.4 gram pro/kg IBW)  Estimated Fluid Needs: 6860-0232 ml(20-25 ml/kg IBW)  Estimated Fluid Needs Calculated From (ml/kg)	1232  Estimated Fluid Needs Calculated To (ml/kg)	1540    Previous Nutrition Diagnosis: (11/16)  Inadequate Energy Intake(resolved on 12/01)    New Nutrition Diagnosis:   [ x] Increased Nutrient Needs; protein-energy       Related to: increased demand for nutrient     As evidenced by: pressure ulcer    Goal: pt to meet >75% protein-energy needs     Interventions:   Recommend  [ ] Change Diet To:  [ ] Nutrition Supplement  [ x] Nutrition Support; recommend increase Vital AF 1.2 @ 45->50 ml/hr =1200 ml, 1440 calories, 90 grams protein, 973 ml free water, 101% RDIs   [ ] Other    Monitoring and Evaluation:   [ ] PO intake [ x ] Tolerance to diet prescription [ x ] weights [ x ] labs[ x ] follow up per protocol  [ ] other:

## 2022-12-08 NOTE — CHART NOTE - NSCHARTNOTEFT_GEN_A_CORE
MICU Transfer Note    Transfer from: MICU    Transfer to: ( x ) Medicine    (  ) Telemetry     (   ) RCU        (    ) Palliative         (   ) Stroke Unit       (  ) MICU     Accepting Physician:  Dr Leal  Signout given to: Mel    HPI: Pt is a 71 yo M with h/o COPD, CAD s/p PCI with stent 2015 and CABG 2014, chronic diastolic heart failure (EF 50%), 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 Unknown how long pt unresponsive for at home. Blood sugar in the 40s with EMS s/p glucagon. On arrival to ER pt hypothermic and agonally breathing. Pt became unresponsive with loss of pulse; ACLS initiated. PEA arrest with ROSC after approximately 5 min s/p Epi x2.  Pt intubated during CODE BLUE. Per son pt on the day prior to presentation reported low blood sugar reads on his glucometer in the range of 80s. Son believes pt continued to take his insulin and oral diabetic regimen. Son states that pt was eating but may have been eating less in the last few days. Later on pt noted to have tonic-clonic Sz. ICU dx: 1) Hypoglycemia 2) PEA arrest 3) Takotsubo cardiomyopathy found on Echo 4) New onset Sz either 2 to hypoglycemia vs anoxic injury 5) ELMER 2 to ATN 6) Shock liver. Pt with improvement in MS. s/p trach/PEG on 12/5    PAST MEDICAL & SURGICAL HISTORY:  HTN (hypertension)      HLD (hyperlipidemia)      Diabetes mellitus      Lacunar infarction      BPH (benign prostatic hyperplasia)      CHF (congestive heart failure)      Chronic obstructive pulmonary disease, unspecified COPD type      S/P CABG (coronary artery bypass graft)  2014          Vital Signs Last 24 Hrs  T(C): 36.9 (08 Dec 2022 16:58), Max: 37.5 (08 Dec 2022 05:23)  T(F): 98.5 (08 Dec 2022 16:58), Max: 99.5 (08 Dec 2022 05:23)  HR: 80 (08 Dec 2022 20:05) (62 - 91)  BP: 137/66 (08 Dec 2022 19:00) (104/46 - 137/66)  BP(mean): 81 (08 Dec 2022 19:00) (56 - 93)  RR: 31 (08 Dec 2022 19:00) (9 - 31)  SpO2: 100% (08 Dec 2022 20:05) (98% - 100%)    Parameters below as of 08 Dec 2022 08:24  Patient On (Oxygen Delivery Method): ventilator      I&O's Summary    07 Dec 2022 07:01  -  08 Dec 2022 07:00  --------------------------------------------------------  IN: 1100 mL / OUT: 0 mL / NET: 1100 mL    08 Dec 2022 07:01  -  08 Dec 2022 21:22  --------------------------------------------------------  IN: 725 mL / OUT: 1 mL / NET: 724 mL      Allergies    No Known Allergies    Intolerances      MEDICATIONS  (STANDING):  aspirin  chewable 81 milliGRAM(s) Oral daily  chlorhexidine 0.12% Liquid 15 milliLiter(s) Oral Mucosa every 12 hours  chlorhexidine 2% Cloths 1 Application(s) Topical <User Schedule>  collagenase Ointment 1 Application(s) Topical two times a day  heparin   Injectable 5000 Unit(s) SubCutaneous every 8 hours  insulin glargine Injectable (LANTUS) 20 Unit(s) SubCutaneous every morning  insulin lispro (ADMELOG) corrective regimen sliding scale   SubCutaneous every 6 hours  levETIRAcetam  IVPB 1000 milliGRAM(s) IV Intermittent <User Schedule>  polyethylene glycol 3350 17 Gram(s) Oral daily  senna 2 Tablet(s) Oral at bedtime  valproic  acid Syrup 500 milliGRAM(s) Enteral Tube every 8 hours    MEDICATIONS  (PRN):  acetaminophen     Tablet .. 650 milliGRAM(s) Oral every 6 hours PRN Temp greater or equal to 38C (100.4F), Mild Pain (1 - 3)  fentaNYL    Injectable 50 MICROGram(s) IV Push every 4 hours PRN Moderate Pain (4 - 6)                          9.3    9.89  )-----------( 199      ( 08 Dec 2022 05:04 )             29.8     12-08    140  |  107  |  22  ----------------------------<  156<H>  4.4   |  30  |  0.69    Ca    8.4<L>      08 Dec 2022 05:04  Phos  2.2     12-08  Mg     2.2     12-08    TPro  5.6<L>  /  Alb  1.5<L>  /  TBili  0.3  /  DBili  x   /  AST  20  /  ALT  27  /  AlkPhos  96  12-08      Lactate: 1.3     Ekg: NSR   Telemetry: NSR     FOR FOLLOW UP:  [] SBT's as tolerated   [] monitor MS, improving, following simple commands  [] diet changed on 12/8 as per nutrition recs   [] ELMER resolved, continue monitoring UOP   [] cont Lantus, ISS and monitor FS     Ambreen Hendricks PA-C

## 2022-12-09 LAB
ALBUMIN SERPL ELPH-MCNC: 1.6 G/DL — LOW (ref 3.3–5)
ALP SERPL-CCNC: 101 U/L — SIGNIFICANT CHANGE UP (ref 40–120)
ALT FLD-CCNC: 27 U/L — SIGNIFICANT CHANGE UP (ref 12–78)
ANION GAP SERPL CALC-SCNC: 3 MMOL/L — LOW (ref 5–17)
AST SERPL-CCNC: 21 U/L — SIGNIFICANT CHANGE UP (ref 15–37)
BILIRUB SERPL-MCNC: 0.3 MG/DL — SIGNIFICANT CHANGE UP (ref 0.2–1.2)
BUN SERPL-MCNC: 18 MG/DL — SIGNIFICANT CHANGE UP (ref 7–23)
CALCIUM SERPL-MCNC: 8.6 MG/DL — SIGNIFICANT CHANGE UP (ref 8.5–10.1)
CHLORIDE SERPL-SCNC: 107 MMOL/L — SIGNIFICANT CHANGE UP (ref 96–108)
CO2 SERPL-SCNC: 30 MMOL/L — SIGNIFICANT CHANGE UP (ref 22–31)
CREAT SERPL-MCNC: 0.6 MG/DL — SIGNIFICANT CHANGE UP (ref 0.5–1.3)
EGFR: 101 ML/MIN/1.73M2 — SIGNIFICANT CHANGE UP
GLUCOSE BLDC GLUCOMTR-MCNC: 102 MG/DL — HIGH (ref 70–99)
GLUCOSE BLDC GLUCOMTR-MCNC: 104 MG/DL — HIGH (ref 70–99)
GLUCOSE BLDC GLUCOMTR-MCNC: 141 MG/DL — HIGH (ref 70–99)
GLUCOSE BLDC GLUCOMTR-MCNC: 158 MG/DL — HIGH (ref 70–99)
GLUCOSE BLDC GLUCOMTR-MCNC: 160 MG/DL — HIGH (ref 70–99)
GLUCOSE BLDC GLUCOMTR-MCNC: 194 MG/DL — HIGH (ref 70–99)
GLUCOSE SERPL-MCNC: 138 MG/DL — HIGH (ref 70–99)
HCT VFR BLD CALC: 29.5 % — LOW (ref 39–50)
HGB BLD-MCNC: 9.4 G/DL — LOW (ref 13–17)
MAGNESIUM SERPL-MCNC: 2 MG/DL — SIGNIFICANT CHANGE UP (ref 1.6–2.6)
MCHC RBC-ENTMCNC: 30.8 PG — SIGNIFICANT CHANGE UP (ref 27–34)
MCHC RBC-ENTMCNC: 31.9 G/DL — LOW (ref 32–36)
MCV RBC AUTO: 96.7 FL — SIGNIFICANT CHANGE UP (ref 80–100)
NRBC # BLD: 0 /100 WBCS — SIGNIFICANT CHANGE UP (ref 0–0)
PHOSPHATE SERPL-MCNC: 2.1 MG/DL — LOW (ref 2.5–4.5)
PLATELET # BLD AUTO: 168 K/UL — SIGNIFICANT CHANGE UP (ref 150–400)
POTASSIUM SERPL-MCNC: 4.4 MMOL/L — SIGNIFICANT CHANGE UP (ref 3.5–5.3)
POTASSIUM SERPL-SCNC: 4.4 MMOL/L — SIGNIFICANT CHANGE UP (ref 3.5–5.3)
PROT SERPL-MCNC: 5.7 GM/DL — LOW (ref 6–8.3)
RBC # BLD: 3.05 M/UL — LOW (ref 4.2–5.8)
RBC # FLD: 14.2 % — SIGNIFICANT CHANGE UP (ref 10.3–14.5)
SODIUM SERPL-SCNC: 140 MMOL/L — SIGNIFICANT CHANGE UP (ref 135–145)
WBC # BLD: 9.46 K/UL — SIGNIFICANT CHANGE UP (ref 3.8–10.5)
WBC # FLD AUTO: 9.46 K/UL — SIGNIFICANT CHANGE UP (ref 3.8–10.5)

## 2022-12-09 PROCEDURE — 99233 SBSQ HOSP IP/OBS HIGH 50: CPT

## 2022-12-09 RX ADMIN — POLYETHYLENE GLYCOL 3350 17 GRAM(S): 17 POWDER, FOR SOLUTION ORAL at 11:36

## 2022-12-09 RX ADMIN — Medication 1 APPLICATION(S): at 18:06

## 2022-12-09 RX ADMIN — Medication 85 MILLIMOLE(S): at 16:05

## 2022-12-09 RX ADMIN — Medication 500 MILLIGRAM(S): at 13:22

## 2022-12-09 RX ADMIN — CHLORHEXIDINE GLUCONATE 15 MILLILITER(S): 213 SOLUTION TOPICAL at 18:06

## 2022-12-09 RX ADMIN — SENNA PLUS 2 TABLET(S): 8.6 TABLET ORAL at 21:30

## 2022-12-09 RX ADMIN — Medication 2: at 02:10

## 2022-12-09 RX ADMIN — Medication 500 MILLIGRAM(S): at 21:31

## 2022-12-09 RX ADMIN — HEPARIN SODIUM 5000 UNIT(S): 5000 INJECTION INTRAVENOUS; SUBCUTANEOUS at 13:22

## 2022-12-09 RX ADMIN — INSULIN GLARGINE 20 UNIT(S): 100 INJECTION, SOLUTION SUBCUTANEOUS at 09:25

## 2022-12-09 RX ADMIN — HEPARIN SODIUM 5000 UNIT(S): 5000 INJECTION INTRAVENOUS; SUBCUTANEOUS at 05:24

## 2022-12-09 RX ADMIN — Medication 2: at 23:51

## 2022-12-09 RX ADMIN — LEVETIRACETAM 400 MILLIGRAM(S): 250 TABLET, FILM COATED ORAL at 05:24

## 2022-12-09 RX ADMIN — HEPARIN SODIUM 5000 UNIT(S): 5000 INJECTION INTRAVENOUS; SUBCUTANEOUS at 21:31

## 2022-12-09 RX ADMIN — CHLORHEXIDINE GLUCONATE 15 MILLILITER(S): 213 SOLUTION TOPICAL at 05:41

## 2022-12-09 RX ADMIN — LEVETIRACETAM 400 MILLIGRAM(S): 250 TABLET, FILM COATED ORAL at 21:31

## 2022-12-09 RX ADMIN — CHLORHEXIDINE GLUCONATE 1 APPLICATION(S): 213 SOLUTION TOPICAL at 05:41

## 2022-12-09 RX ADMIN — Medication 500 MILLIGRAM(S): at 05:25

## 2022-12-09 RX ADMIN — Medication 81 MILLIGRAM(S): at 11:37

## 2022-12-09 RX ADMIN — FENTANYL CITRATE 50 MICROGRAM(S): 50 INJECTION INTRAVENOUS at 02:10

## 2022-12-09 RX ADMIN — Medication 2: at 16:40

## 2022-12-09 RX ADMIN — FENTANYL CITRATE 50 MICROGRAM(S): 50 INJECTION INTRAVENOUS at 02:40

## 2022-12-09 RX ADMIN — Medication 1 APPLICATION(S): at 05:41

## 2022-12-09 RX ADMIN — LEVETIRACETAM 400 MILLIGRAM(S): 250 TABLET, FILM COATED ORAL at 13:22

## 2022-12-09 NOTE — PROGRESS NOTE ADULT - SUBJECTIVE AND OBJECTIVE BOX
Patient seen and examined  son at bedside  s/p trach to vent peg    Review of Systems:  General:denies fever chills, headache, weakness  HEENT: denies blurry vision,diffculty swallowing, difficulty hearing, tinnitus  Cardiovascular: denies chest pain  ,palpitations  Pulmonary:denies shortness of breath, cough, wheezing, hemoptysis  Gastrointestinal: denies abdominal pain, constipation, diarrhea,nausea , vomiting, hematochezia  : denies hematuria, dysuria, or incontinence  Neurological: denies weakness, numbness , tingling, dizziness, tremors  MSK: denies muscle pain, difficulty ambulating, swelling, back pain  skin: denies skin rash, itching, burning, or  skin lesions  Psychiatrical: denies mood disturbances, anxierty, feeling depressed, depression , or difficulty sleeping    Objective:  Vitals  T(C): 36.4 (12-09-22 @ 10:59), Max: 37.8 (12-09-22 @ 02:23)  HR: 80 (12-09-22 @ 12:02) (62 - 87)  BP: 134/78 (12-09-22 @ 11:00) (108/46 - 143/62)  RR: 20 (12-09-22 @ 11:00) (14 - 31)  SpO2: 100% (12-09-22 @ 12:02) (98% - 100%)    Physical Exam:  General: comfortable, no acute distress, well nourished  HEENT: Atraumatic, no LAD, trachea midline, PERRLA  Cardiovascular: normal s1s2, no murmurs, gallops or fricition rubs  Pulmonary: clear to ausculation Bilaterally, no wheezing , rhonchi + trach  Gastrointestinal: soft non tender non distended, no masses felt, no organomegally + PEG at 45  Muscloskeletal: no lower extremity edema, intact bilateral lower extremity pulses  Neurological: CN II-12 intact. No focal weakness  Psychiatrical: normal mood, cooperative  SKIN: no rash, lesions or ulcers    Labs:                          9.3    9.89  )-----------( 199      ( 08 Dec 2022 05:04 )             29.8     12-08    140  |  107  |  22  ----------------------------<  156<H>  4.4   |  30  |  0.69    Ca    8.4<L>      08 Dec 2022 05:04  Phos  2.2     12-08  Mg     2.2     12-08    TPro  5.6<L>  /  Alb  1.5<L>  /  TBili  0.3  /  DBili  x   /  AST  20  /  ALT  27  /  AlkPhos  96  12-08    LIVER FUNCTIONS - ( 08 Dec 2022 05:04 )  Alb: 1.5 g/dL / Pro: 5.6 gm/dL / ALK PHOS: 96 U/L / ALT: 27 U/L / AST: 20 U/L / GGT: x                 Active Medications  MEDICATIONS  (STANDING):  aspirin  chewable 81 milliGRAM(s) Oral daily  chlorhexidine 0.12% Liquid 15 milliLiter(s) Oral Mucosa every 12 hours  chlorhexidine 2% Cloths 1 Application(s) Topical <User Schedule>  collagenase Ointment 1 Application(s) Topical two times a day  heparin   Injectable 5000 Unit(s) SubCutaneous every 8 hours  insulin glargine Injectable (LANTUS) 20 Unit(s) SubCutaneous every morning  insulin lispro (ADMELOG) corrective regimen sliding scale   SubCutaneous every 6 hours  levETIRAcetam  IVPB 1000 milliGRAM(s) IV Intermittent <User Schedule>  polyethylene glycol 3350 17 Gram(s) Oral daily  senna 2 Tablet(s) Oral at bedtime  valproic  acid Syrup 500 milliGRAM(s) Enteral Tube every 8 hours    MEDICATIONS  (PRN):  acetaminophen     Tablet .. 650 milliGRAM(s) Oral every 6 hours PRN Temp greater or equal to 38C (100.4F), Mild Pain (1 - 3)  fentaNYL    Injectable 50 MICROGram(s) IV Push every 4 hours PRN Moderate Pain (4 - 6)     Patient seen and examined  son at bedside  s/p trach to vent peg  vitals stable  labs pending    Review of Systems: able to ROS throug vent  General:denies fever chills, headache, weakness  HEENT: denies blurry vision,diffculty swallowing, difficulty hearing, tinnitus  Cardiovascular: denies chest pain  ,palpitations  Pulmonary:denies shortness of breath, cough, wheezing, hemoptysis  Gastrointestinal: denies abdominal pain, constipation, diarrhea,nausea , vomiting, hematochezia  : denies hematuria, dysuria, or incontinence  Neurological: denies weakness, numbness , tingling, dizziness, tremors  MSK: denies muscle pain, difficulty ambulating, swelling, back pain  skin: denies skin rash, itching, burning, or  skin lesions  Psychiatrical: denies mood disturbances, anxierty, feeling depressed, depression , or difficulty sleeping    Objective:  Vitals  T(C): 36.4 (12-09-22 @ 10:59), Max: 37.8 (12-09-22 @ 02:23)  HR: 80 (12-09-22 @ 12:02) (62 - 87)  BP: 134/78 (12-09-22 @ 11:00) (108/46 - 143/62)  RR: 20 (12-09-22 @ 11:00) (14 - 31)  SpO2: 100% (12-09-22 @ 12:02) (98% - 100%)    Physical Exam:  General: comfortable, no acute distress, well nourished  HEENT: Atraumatic, no LAD, trachea midline, PERRLA  Cardiovascular: normal s1s2, no murmurs, gallops or fricition rubs  Pulmonary: clear to ausculation Bilaterally, no wheezing , rhonchi + trach  Gastrointestinal: soft non tender non distended, no masses felt, no organomegally + PEG at 45  Muscloskeletal: no lower extremity edema, intact bilateral lower extremity pulses  Neurological: CN II-12 intact. No focal weakness  Psychiatrical: normal mood, cooperative  SKIN: no rash, lesions or ulcers    Labs:                          9.3    9.89  )-----------( 199      ( 08 Dec 2022 05:04 )             29.8     12-08    140  |  107  |  22  ----------------------------<  156<H>  4.4   |  30  |  0.69    Ca    8.4<L>      08 Dec 2022 05:04  Phos  2.2     12-08  Mg     2.2     12-08    TPro  5.6<L>  /  Alb  1.5<L>  /  TBili  0.3  /  DBili  x   /  AST  20  /  ALT  27  /  AlkPhos  96  12-08    LIVER FUNCTIONS - ( 08 Dec 2022 05:04 )  Alb: 1.5 g/dL / Pro: 5.6 gm/dL / ALK PHOS: 96 U/L / ALT: 27 U/L / AST: 20 U/L / GGT: x                 Active Medications  MEDICATIONS  (STANDING):  aspirin  chewable 81 milliGRAM(s) Oral daily  chlorhexidine 0.12% Liquid 15 milliLiter(s) Oral Mucosa every 12 hours  chlorhexidine 2% Cloths 1 Application(s) Topical <User Schedule>  collagenase Ointment 1 Application(s) Topical two times a day  heparin   Injectable 5000 Unit(s) SubCutaneous every 8 hours  insulin glargine Injectable (LANTUS) 20 Unit(s) SubCutaneous every morning  insulin lispro (ADMELOG) corrective regimen sliding scale   SubCutaneous every 6 hours  levETIRAcetam  IVPB 1000 milliGRAM(s) IV Intermittent <User Schedule>  polyethylene glycol 3350 17 Gram(s) Oral daily  senna 2 Tablet(s) Oral at bedtime  valproic  acid Syrup 500 milliGRAM(s) Enteral Tube every 8 hours    MEDICATIONS  (PRN):  acetaminophen     Tablet .. 650 milliGRAM(s) Oral every 6 hours PRN Temp greater or equal to 38C (100.4F), Mild Pain (1 - 3)  fentaNYL    Injectable 50 MICROGram(s) IV Push every 4 hours PRN Moderate Pain (4 - 6)

## 2022-12-09 NOTE — PROGRESS NOTE ADULT - ASSESSMENT
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 Unknown how long pt unresponsive for at home. Blood sugar in the 40s with EMS s/p glucagon. On arrival to ER pt hypothermic and agonally breathing. Pt became unresponsive with loss of pulse; ACLS initiated. PEA arrest with ROSC after approximately 5 min s/p Epi x2.  Pt intubated during CODE BLUE. Per son pt on the day prior to presentation reported low blood sugar reads on his glucometer in the range of 80s. Son believes pt continued to take his insulin and oral diabetic regimen. Son states that pt was eating but may have been eating less in the last few days. Later on pt noted to have tonic-clonic Sz. ICU dx: 1) Hypoglycemia 2) PEA arrest 3) Takotsubo cardiomyopathy found on Echo 4) New onset Sz either 2 to hypoglycemia vs anoxic injury 5) ELMER 2 to ATN 6) Shock liver. Pt with improvement in MS. s/p trach/PEG on 12/5    Resp: Daily SBT  ID: s/p Cefepime  Heme: DVT prophylaxis with sq Heparin   FEN: Cont enteral feeds  Endo: Adjust Lantus + Lispro to FS  Renal: Follow BUN/Cr and UO  Neuro: Cont Valproic + Keppra as per Neuro  Social: Son at bedside and updated/ Aggressive PT/OT/ May transfer to [facility]

## 2022-12-09 NOTE — PROGRESS NOTE ADULT - SUBJECTIVE AND OBJECTIVE BOX
*Pt seen and examined , note written   HPI:  Pt is a 73 yo M with h/o COPD, CAD s/p PCI with stent  and CABG , chronic diastolic heart failure (EF 50%), 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 Unknown how long pt unresponsive for at home. Blood sugar in the 40s with EMS s/p glucagon. On arrival to ER pt hypothermic and agonally breathing. Pt became unresponsive with loss of pulse; ACLS initiated. PEA arrest with ROSC after approximately 5 min s/p Epi x2.  Pt intubated during CODE BLUE. Per son pt on the day prior to presentation reported low blood sugar reads on his glucometer in the range of 80s. Son believes pt continued to take his insulin and oral diabetic regimen. Son states that pt was eating but may have been eating less in the last few days. Later on pt noted to have tonic-clonic Sz. ICU dx: 1) Hypoglycemia 2) PEA arrest 3) Takotsubo cardiomyopathy found on Echo 4) New onset Sz either 2 to hypoglycemia vs anoxic injury 5) ELMER 2 to ATN 6) Shock liver. Pt with improvement in MS. s/p trach/PEG on       ## Labs:  CBC:                        9.3    9.89  )-----------( 199      ( 08 Dec 2022 05:04 )             29.8     Chem:      140  |  107  |  22  ----------------------------<  156<H>  4.4   |  30  |  0.69    Ca    8.4<L>      08 Dec 2022 05:04  Phos  2.2     12-08  Mg     2.2     12-08    TPro  5.6<L>  /  Alb  1.5<L>  /  TBili  0.3  /  DBili  x   /  AST  20  /  ALT  27  /  AlkPhos  96  12-    Coags:          ## Imaging:    ## Medications:        insulin glargine Injectable (LANTUS) 20 Unit(s) SubCutaneous every morning  insulin lispro (ADMELOG) corrective regimen sliding scale   SubCutaneous every 6 hours    aspirin  chewable 81 milliGRAM(s) Oral daily  heparin   Injectable 5000 Unit(s) SubCutaneous every 8 hours    polyethylene glycol 3350 17 Gram(s) Oral daily  senna 2 Tablet(s) Oral at bedtime    acetaminophen     Tablet .. 650 milliGRAM(s) Oral every 6 hours PRN  fentaNYL    Injectable 50 MICROGram(s) IV Push every 4 hours PRN  levETIRAcetam  IVPB 1000 milliGRAM(s) IV Intermittent <User Schedule>  valproic  acid Syrup 500 milliGRAM(s) Enteral Tube every 8 hours      ## Vitals:  T(C): 36.7 (22 @ 04:15), Max: 37.8 (22 @ 02:23)  HR: 81 (22 @ 09:04) (62 - 91)  BP: 132/74 (22 @ 04:15) (108/46 - 143/62)  BP(mean): 94 (22 @ 04:15) (56 - 94)  RR: 20 (22 @ 04:15) (14 - 31)  SpO2: 100% (22 @ 09:04) (98% - 100%)  Wt(kg): --  Vent: Mode: AC/ CMV (Assist Control/ Continuous Mandatory Ventilation), RR (machine): 14, RR (patient): 19, TV (machine): 450, FiO2: 30, PEEP: 5, PIP: 24  AB-08 @ 07:01  -   @ 07:00  --------------------------------------------------------  IN: 725 mL / OUT: 1 mL / NET: 724 mL          ## P/E:  Gen: lying comfortably in bed in no apparent distress  Neck: (+) trach  Lungs: CTA  Heart: RRR  Abd: Soft/+BS/ Non-tender  Ext: Less UE edema  Neuro: Awake, alert and following commands    CENTRAL LINE: [ ] YES [ ] NO  LOCATION:   DATE INSERTED:  REMOVE: [ ] YES [ ] NO      LUJAN: [ ] YES [ ] NO    DATE INSERTED:  REMOVE:  [ ] YES [ ] NO      A-LINE:  [ ] YES [ ] NO  LOCATION:   DATE INSERTED:  REMOVE:  [ ] YES [ ] NO  EXPLAIN:      CODE STATUS: [x ] full code  [ ] DNR  [ ] DNI  [ ] MOLST  Goals of care discussion: [ ] yes

## 2022-12-09 NOTE — PROGRESS NOTE ADULT - ASSESSMENT
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 Unknown how long pt unresponsive for at home. Blood sugar in the 40s with EMS s/p glucagon. On arrival to ER pt hypothermic and agonally breathing. Pt became unresponsive with loss of pulse; ACLS initiated. PEA arrest with ROSC after approximately 5 min s/p Epi x2.  Pt intubated during CODE BLUE. Per son pt on the day prior to presentation reported low blood sugar reads on his glucometer in the range of 80s. Son believes pt continued to take his insulin and oral diabetic regimen. Son states that pt was eating but may have been eating less in the last few days. Later on pt noted to have tonic-clonic Sz.     PEA arrest  Takotsubo cardiomyopathy found on Echo     New onset Sz either 2 to hypoglycemia vs anoxic injury      ELMER 2 to ATN     Shock liver.  due to PEA arre    Pt with improvement in MS. s/p trach/PEG on 12/5    Resp: Daily SBT  ID: s/p Cefepime  Heme: DVT prophylaxis with sq Heparin   FEN: Cont enteral feeds  Endo: Adjust Lantus + Lispro to FS  Renal: Follow BUN/Cr and UO  Neuro: Cont Valproic + Keppra as per Neuro  Social: Son at bedside and updated/ Aggressive PT/OT/ May transfer to [facility]     Pt is a 71 yo M with h/o COPD, CAD s/p PCI with stent 2015 and CABG 2014, chronic diastolic heart failure (EF 50%), 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 Unknown how long pt unresponsive for at home. Blood sugar in the 40s with EMS s/p glucagon. On arrival to ER pt hypothermic and agonally breathing. Pt became unresponsive with loss of pulse; ACLS initiated. PEA arrest with ROSC after approximately 5 min s/p Epi x2.  Pt intubated during CODE BLUE. Per son pt on the day prior to presentation reported low blood sugar reads on his glucometer in the range of 80s. Son believes pt continued to take his insulin and oral diabetic regimen. Son states that pt was eating but may have been eating less in the last few days. Later on pt noted to have tonic-clonic Sz.     PEA arrest  s/p Epi x 2. ROSC achieved  Takotsubo cardiomyopathy found on Echo   stable    Acute hypoxemic respiratory failure now chronic  s/p intubation , failed extubation, now trach to vent  plan:  c/w vent managment  pulm consulted  will need sutures removed    New onset Seizure likely due to anoxic brain injury  Cont Valproic + Keppra as per neuro  ELMER 2 to ATN; resolved    Shock liver.  due to PEA arrest. resolved

## 2022-12-10 LAB
ANION GAP SERPL CALC-SCNC: 3 MMOL/L — LOW (ref 5–17)
BASOPHILS # BLD AUTO: 0.06 K/UL — SIGNIFICANT CHANGE UP (ref 0–0.2)
BASOPHILS NFR BLD AUTO: 0.7 % — SIGNIFICANT CHANGE UP (ref 0–2)
BUN SERPL-MCNC: 18 MG/DL — SIGNIFICANT CHANGE UP (ref 7–23)
CALCIUM SERPL-MCNC: 8.7 MG/DL — SIGNIFICANT CHANGE UP (ref 8.5–10.1)
CHLORIDE SERPL-SCNC: 103 MMOL/L — SIGNIFICANT CHANGE UP (ref 96–108)
CO2 SERPL-SCNC: 33 MMOL/L — HIGH (ref 22–31)
CREAT SERPL-MCNC: 0.6 MG/DL — SIGNIFICANT CHANGE UP (ref 0.5–1.3)
EGFR: 101 ML/MIN/1.73M2 — SIGNIFICANT CHANGE UP
EOSINOPHIL # BLD AUTO: 0.33 K/UL — SIGNIFICANT CHANGE UP (ref 0–0.5)
EOSINOPHIL NFR BLD AUTO: 3.6 % — SIGNIFICANT CHANGE UP (ref 0–6)
GLUCOSE BLDC GLUCOMTR-MCNC: 121 MG/DL — HIGH (ref 70–99)
GLUCOSE BLDC GLUCOMTR-MCNC: 123 MG/DL — HIGH (ref 70–99)
GLUCOSE BLDC GLUCOMTR-MCNC: 145 MG/DL — HIGH (ref 70–99)
GLUCOSE BLDC GLUCOMTR-MCNC: 163 MG/DL — HIGH (ref 70–99)
GLUCOSE SERPL-MCNC: 115 MG/DL — HIGH (ref 70–99)
HCT VFR BLD CALC: 30.7 % — LOW (ref 39–50)
HGB BLD-MCNC: 9.6 G/DL — LOW (ref 13–17)
IMM GRANULOCYTES NFR BLD AUTO: 1 % — HIGH (ref 0–0.9)
LYMPHOCYTES # BLD AUTO: 1.48 K/UL — SIGNIFICANT CHANGE UP (ref 1–3.3)
LYMPHOCYTES # BLD AUTO: 16.2 % — SIGNIFICANT CHANGE UP (ref 13–44)
MCHC RBC-ENTMCNC: 30.1 PG — SIGNIFICANT CHANGE UP (ref 27–34)
MCHC RBC-ENTMCNC: 31.3 G/DL — LOW (ref 32–36)
MCV RBC AUTO: 96.2 FL — SIGNIFICANT CHANGE UP (ref 80–100)
MONOCYTES # BLD AUTO: 1.1 K/UL — HIGH (ref 0–0.9)
MONOCYTES NFR BLD AUTO: 12.1 % — SIGNIFICANT CHANGE UP (ref 2–14)
NEUTROPHILS # BLD AUTO: 6.06 K/UL — SIGNIFICANT CHANGE UP (ref 1.8–7.4)
NEUTROPHILS NFR BLD AUTO: 66.4 % — SIGNIFICANT CHANGE UP (ref 43–77)
NRBC # BLD: 0 /100 WBCS — SIGNIFICANT CHANGE UP (ref 0–0)
PLATELET # BLD AUTO: 269 K/UL — SIGNIFICANT CHANGE UP (ref 150–400)
POTASSIUM SERPL-MCNC: 4.2 MMOL/L — SIGNIFICANT CHANGE UP (ref 3.5–5.3)
POTASSIUM SERPL-SCNC: 4.2 MMOL/L — SIGNIFICANT CHANGE UP (ref 3.5–5.3)
RBC # BLD: 3.19 M/UL — LOW (ref 4.2–5.8)
RBC # FLD: 14.1 % — SIGNIFICANT CHANGE UP (ref 10.3–14.5)
SODIUM SERPL-SCNC: 139 MMOL/L — SIGNIFICANT CHANGE UP (ref 135–145)
WBC # BLD: 9.12 K/UL — SIGNIFICANT CHANGE UP (ref 3.8–10.5)
WBC # FLD AUTO: 9.12 K/UL — SIGNIFICANT CHANGE UP (ref 3.8–10.5)

## 2022-12-10 PROCEDURE — 99233 SBSQ HOSP IP/OBS HIGH 50: CPT

## 2022-12-10 RX ORDER — TRAMADOL HYDROCHLORIDE 50 MG/1
25 TABLET ORAL EVERY 8 HOURS
Refills: 0 | Status: DISCONTINUED | OUTPATIENT
Start: 2022-12-10 | End: 2022-12-14

## 2022-12-10 RX ADMIN — LEVETIRACETAM 400 MILLIGRAM(S): 250 TABLET, FILM COATED ORAL at 05:24

## 2022-12-10 RX ADMIN — Medication 500 MILLIGRAM(S): at 05:24

## 2022-12-10 RX ADMIN — Medication 81 MILLIGRAM(S): at 13:28

## 2022-12-10 RX ADMIN — HEPARIN SODIUM 5000 UNIT(S): 5000 INJECTION INTRAVENOUS; SUBCUTANEOUS at 23:16

## 2022-12-10 RX ADMIN — CHLORHEXIDINE GLUCONATE 15 MILLILITER(S): 213 SOLUTION TOPICAL at 18:54

## 2022-12-10 RX ADMIN — LEVETIRACETAM 400 MILLIGRAM(S): 250 TABLET, FILM COATED ORAL at 13:29

## 2022-12-10 RX ADMIN — HEPARIN SODIUM 5000 UNIT(S): 5000 INJECTION INTRAVENOUS; SUBCUTANEOUS at 05:24

## 2022-12-10 RX ADMIN — Medication 1 APPLICATION(S): at 17:53

## 2022-12-10 RX ADMIN — SENNA PLUS 2 TABLET(S): 8.6 TABLET ORAL at 23:15

## 2022-12-10 RX ADMIN — Medication 500 MILLIGRAM(S): at 23:15

## 2022-12-10 RX ADMIN — Medication 650 MILLIGRAM(S): at 00:15

## 2022-12-10 RX ADMIN — CHLORHEXIDINE GLUCONATE 15 MILLILITER(S): 213 SOLUTION TOPICAL at 05:24

## 2022-12-10 RX ADMIN — INSULIN GLARGINE 20 UNIT(S): 100 INJECTION, SOLUTION SUBCUTANEOUS at 08:15

## 2022-12-10 RX ADMIN — Medication 1 APPLICATION(S): at 05:25

## 2022-12-10 RX ADMIN — LEVETIRACETAM 400 MILLIGRAM(S): 250 TABLET, FILM COATED ORAL at 23:15

## 2022-12-10 RX ADMIN — CHLORHEXIDINE GLUCONATE 1 APPLICATION(S): 213 SOLUTION TOPICAL at 05:25

## 2022-12-10 RX ADMIN — Medication 500 MILLIGRAM(S): at 13:30

## 2022-12-10 RX ADMIN — HEPARIN SODIUM 5000 UNIT(S): 5000 INJECTION INTRAVENOUS; SUBCUTANEOUS at 13:29

## 2022-12-10 NOTE — PROGRESS NOTE ADULT - SUBJECTIVE AND OBJECTIVE BOX
Patient seen and examined bedside   no overnight events     ROS: unable to examine due to [ ] Encephalopathy  [ ] Advanced Dementia  [ ] Expressive Aphasia  [x ] TRACH/VenT     Review of Systems: able to ROS throug vent  General:denies fever chills, headache, weakness  HEENT: denies blurry vision,diffculty swallowing, difficulty hearing, tinnitus  Cardiovascular: denies chest pain  ,palpitations  Pulmonary:denies shortness of breath, cough, wheezing, hemoptysis  Gastrointestinal: denies abdominal pain, constipation, diarrhea,nausea , vomiting, hematochezia  : denies hematuria, dysuria, or incontinence  Neurological: denies weakness, numbness , tingling, dizziness, tremors  MSK: denies muscle pain, difficulty ambulating, swelling, back pain  skin: denies skin rash, itching, burning, or  skin lesions  Psychiatrical: denies mood disturbances, anxierty, feeling depressed, depression , or difficulty sleeping    Objective:  Vitals  T(C): 36.4 (12-09-22 @ 10:59), Max: 37.8 (12-09-22 @ 02:23)  HR: 80 (12-09-22 @ 12:02) (62 - 87)  BP: 134/78 (12-09-22 @ 11:00) (108/46 - 143/62)  RR: 20 (12-09-22 @ 11:00) (14 - 31)  SpO2: 100% (12-09-22 @ 12:02) (98% - 100%)    Physical Exam:  GENERAL: NAD,  no increased WOB  HEAD:  Atraumatic, Normocephalic  EYES: EOMI, PERRLA, conjunctiva and sclera clear  ENMT: No tonsillar erythema, exudates, or enlargement; Moist mucous membranes   NECK: Supple, No JVD, +TRACH  NERVOUS SYSTEM:  awake and alert, slightly agitated at times , moving extremities spontaneously   CHEST/LUNG: CTAB;  No rales, rhonchi, wheezing, or rubs  HEART: Regular rate and rhythm; No murmurs, rubs, or gallops  ABDOMEN: Soft, Nontender, Nondistended; Bowel sounds present; +PEG   EXTREMITIES:  2+ Peripheral Pulses b/l, No clubbing, cyanosis, or edema b/l         Labs:                                     9.6    9.12  )-----------( 269      ( 10 Dec 2022 06:38 )             30.7   12-10    139  |  103  |  18  ----------------------------<  115<H>  4.2   |  33<H>  |  0.60    Ca    8.7      10 Dec 2022 06:38  Phos  2.1     12-09  Mg     2.0     12-09    TPro  5.7<L>  /  Alb  1.6<L>  /  TBili  0.3  /  DBili  x   /  AST  21  /  ALT  27  /  AlkPhos  101  12-09            Active Medications  MEDICATIONS  (STANDING):  aspirin  chewable 81 milliGRAM(s) Oral daily  chlorhexidine 0.12% Liquid 15 milliLiter(s) Oral Mucosa every 12 hours  chlorhexidine 2% Cloths 1 Application(s) Topical <User Schedule>  collagenase Ointment 1 Application(s) Topical two times a day  heparin   Injectable 5000 Unit(s) SubCutaneous every 8 hours  insulin glargine Injectable (LANTUS) 20 Unit(s) SubCutaneous every morning  insulin lispro (ADMELOG) corrective regimen sliding scale   SubCutaneous every 6 hours  levETIRAcetam  IVPB 1000 milliGRAM(s) IV Intermittent <User Schedule>  polyethylene glycol 3350 17 Gram(s) Oral daily  senna 2 Tablet(s) Oral at bedtime  valproic  acid Syrup 500 milliGRAM(s) Enteral Tube every 8 hours    MEDICATIONS  (PRN):  acetaminophen     Tablet .. 650 milliGRAM(s) Oral every 6 hours PRN Temp greater or equal to 38C (100.4F), Mild Pain (1 - 3)  fentaNYL    Injectable 50 MICROGram(s) IV Push every 4 hours PRN Moderate Pain (4 - 6)    Consultant(s) Notes Reveiwed [x ] Yes     Care Discussed with [ x] Consultants  [x ] Patient  [ ] Family  [x ] /   [x ] Other; RN

## 2022-12-10 NOTE — PROGRESS NOTE ADULT - ASSESSMENT
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 Unknown how long pt unresponsive for at home. Blood sugar in the 40s with EMS s/p glucagon. On arrival to ER pt hypothermic and agonally breathing. Pt became unresponsive with loss of pulse; ACLS initiated. PEA arrest with ROSC after approximately 5 min s/p Epi x2.  Pt intubated during CODE BLUE. Per son pt on the day prior to presentation reported low blood sugar reads on his glucometer in the range of 80s. Son believes pt continued to take his insulin and oral diabetic regimen. Son states that pt was eating but may have been eating less in the last few days. Later on pt noted to have tonic-clonic Sz.     PEA arrest  s/p Epi x 2. ROSC achieved  Takotsubo cardiomyopathy found on Echo   stable    Acute hypoxemic respiratory failure now chronic  s/p intubation , failed extubation, now trach to vent  plan:  c/w vent managment  pulm consulted  will need sutures removed    New onset Seizure likely due to anoxic brain injury  Cont Valproic + Keppra as per neuro  ELMER 2 to ATN; resolved    Shock liver.  due to PEA arrest. resolved

## 2022-12-11 LAB
GLUCOSE BLDC GLUCOMTR-MCNC: 149 MG/DL — HIGH (ref 70–99)
GLUCOSE BLDC GLUCOMTR-MCNC: 160 MG/DL — HIGH (ref 70–99)
GLUCOSE BLDC GLUCOMTR-MCNC: 172 MG/DL — HIGH (ref 70–99)
GLUCOSE BLDC GLUCOMTR-MCNC: 176 MG/DL — HIGH (ref 70–99)
GLUCOSE BLDC GLUCOMTR-MCNC: 195 MG/DL — HIGH (ref 70–99)
GLUCOSE BLDC GLUCOMTR-MCNC: 215 MG/DL — HIGH (ref 70–99)

## 2022-12-11 PROCEDURE — 99222 1ST HOSP IP/OBS MODERATE 55: CPT

## 2022-12-11 PROCEDURE — 93931 UPPER EXTREMITY STUDY: CPT | Mod: 26,LT

## 2022-12-11 PROCEDURE — 93971 EXTREMITY STUDY: CPT | Mod: 26,LT

## 2022-12-11 PROCEDURE — 99233 SBSQ HOSP IP/OBS HIGH 50: CPT

## 2022-12-11 RX ORDER — CEPHALEXIN 500 MG
500 CAPSULE ORAL
Refills: 0 | Status: DISCONTINUED | OUTPATIENT
Start: 2022-12-11 | End: 2022-12-13

## 2022-12-11 RX ADMIN — Medication 500 MILLIGRAM(S): at 06:01

## 2022-12-11 RX ADMIN — TRAMADOL HYDROCHLORIDE 25 MILLIGRAM(S): 50 TABLET ORAL at 23:59

## 2022-12-11 RX ADMIN — SENNA PLUS 2 TABLET(S): 8.6 TABLET ORAL at 21:17

## 2022-12-11 RX ADMIN — Medication 1 APPLICATION(S): at 17:26

## 2022-12-11 RX ADMIN — HEPARIN SODIUM 5000 UNIT(S): 5000 INJECTION INTRAVENOUS; SUBCUTANEOUS at 14:51

## 2022-12-11 RX ADMIN — LEVETIRACETAM 400 MILLIGRAM(S): 250 TABLET, FILM COATED ORAL at 06:01

## 2022-12-11 RX ADMIN — Medication 500 MILLIGRAM(S): at 21:17

## 2022-12-11 RX ADMIN — TRAMADOL HYDROCHLORIDE 25 MILLIGRAM(S): 50 TABLET ORAL at 23:03

## 2022-12-11 RX ADMIN — Medication 1 APPLICATION(S): at 06:01

## 2022-12-11 RX ADMIN — Medication 650 MILLIGRAM(S): at 12:00

## 2022-12-11 RX ADMIN — Medication 2: at 23:16

## 2022-12-11 RX ADMIN — Medication 81 MILLIGRAM(S): at 11:38

## 2022-12-11 RX ADMIN — HEPARIN SODIUM 5000 UNIT(S): 5000 INJECTION INTRAVENOUS; SUBCUTANEOUS at 21:17

## 2022-12-11 RX ADMIN — Medication 500 MILLIGRAM(S): at 23:11

## 2022-12-11 RX ADMIN — Medication 650 MILLIGRAM(S): at 11:37

## 2022-12-11 RX ADMIN — Medication 500 MILLIGRAM(S): at 16:22

## 2022-12-11 RX ADMIN — CHLORHEXIDINE GLUCONATE 15 MILLILITER(S): 213 SOLUTION TOPICAL at 06:01

## 2022-12-11 RX ADMIN — CHLORHEXIDINE GLUCONATE 15 MILLILITER(S): 213 SOLUTION TOPICAL at 17:26

## 2022-12-11 RX ADMIN — Medication 2: at 00:57

## 2022-12-11 RX ADMIN — INSULIN GLARGINE 20 UNIT(S): 100 INJECTION, SOLUTION SUBCUTANEOUS at 08:23

## 2022-12-11 RX ADMIN — HEPARIN SODIUM 5000 UNIT(S): 5000 INJECTION INTRAVENOUS; SUBCUTANEOUS at 06:00

## 2022-12-11 RX ADMIN — LEVETIRACETAM 400 MILLIGRAM(S): 250 TABLET, FILM COATED ORAL at 14:49

## 2022-12-11 RX ADMIN — LEVETIRACETAM 400 MILLIGRAM(S): 250 TABLET, FILM COATED ORAL at 21:16

## 2022-12-11 RX ADMIN — Medication 500 MILLIGRAM(S): at 14:50

## 2022-12-11 RX ADMIN — Medication 2: at 12:44

## 2022-12-11 NOTE — CONSULT NOTE ADULT - CONSULT REASON
Elevated serum creatinine. 61M former smoker w/CHF (TTE 11/14/22 EF 57%), HTN, HLD, T2DM on insulin, recent admission for NSTEMI and CHF exacerbation c/b RADHA, p/w worsening CORCORAN, LE edema, scrotal swelling, and cough for the past few weeks. Patient was discharged after recent admission in November off diuretics due to RADHA, was restarted on torsemide 20mg BID approximately 1 week ago. Diuretics have not really helped the swelling. Has also been taking levofloxacin, reportedly for cellulitis of the lower extremities. Admitted to medicine for acute on chronic diastolic heart failure exacerbation, also found to be COVID positive. Evaluated by Cardiology, recommends continued diuresis with Bumex 2 IV BID and spironolactone, TTE still pending to evaluate cardiac function. Strict I&Os, daily weights, monitoring on tele. Supportive measures for COVID, currently only requiring 2L O2, will wean as tolerated. RADHA likely in setting of HF/cardiorenal vs COVID, continue to trend BMP, diuresis. Cr stable in the 1.6-1.8 range. B/l LE likely with chronic venous stasis as opposed to cellulitis, Wound Care consult pending, Duplex LE still pending. C/w antihypertensive agents for HTN, insulin regimen for DM.

## 2022-12-11 NOTE — CONSULT NOTE ADULT - SUBJECTIVE AND OBJECTIVE BOX
Vascular Attending consulted for left hand wound where there was a previous IV inserted. Per family left arm had been much more swollen but blister appears to have not healed well.     HPI:   Mr. Hamilton is a 72 year old male with a PMH of lacunar infarct, CHF (EF ~40% per son), CAD s/p PCI with stent and CABGx3 (~2020), s/p PPM (medtronic), HTN, COPD, DMII on insulin and PO meds, BPH, and CKD (stage II?) presenting to ICU s/p cardiac arrest in ED. Per patient son he has recently been doing well and in his normal state of health, however, yesterday Mr. Hamilton started c/o low blood sugars on glucometer. Pt reportedly continued to take his home medications, including both oral antidiabetic agents and insulin (both long and short acting). Today his son found him minimally responsive hanging off his bed, checked his glucose and it read "low," so he called 911. In ED pt was noted to be hypothermic with glucose noted to be 70 just prior to PEA arrest. Pt was coded for ~5 minutes with 2 epi given with ROSC. Pt intubated at that time. is moving all extremities equally and with reactive, equal pupils. He remained hypothermic and is not requiring vasopressor support. Pending CT Scans. ROS otherwise negative per son besides hypoglycemia, with no known sick contacts. Pt accepted to ICU for further management / care.  (14 Nov 2022 01:33)        PAST MEDICAL & SURGICAL HISTORY:  HTN (hypertension)      HLD (hyperlipidemia)      Diabetes mellitus      Lacunar infarction      BPH (benign prostatic hyperplasia)      CHF (congestive heart failure)      Chronic obstructive pulmonary disease, unspecified COPD type      S/P CABG (coronary artery bypass graft)  2014          Allergies    No Known Allergies      Vital Signs Last 24 Hrs  T(C): 36.6 (11 Dec 2022 17:00), Max: 38.1 (11 Dec 2022 12:01)  T(F): 97.9 (11 Dec 2022 17:00), Max: 100.6 (11 Dec 2022 12:01)  HR: 87 (11 Dec 2022 17:00) (61 - 111)  BP: 116/74 (11 Dec 2022 17:00) (92/59 - 132/76)  BP(mean): --  RR: 20 (11 Dec 2022 17:00) (18 - 20)  SpO2: 100% (11 Dec 2022 17:00) (95% - 100%)    Parameters below as of 11 Dec 2022 17:00  Patient On (Oxygen Delivery Method): ventilator    O2 Concentration (%): 30    Gen: awake, alert nods yes/no appropriately s/p trach/peg  HEENT: trach in place  CV: S1 S2 RRR  Lung:mechanical bs b/l   Abd: soft nt nd  Extremities: ++edema to all extremities, left hand with 2cm x3cm area of skin breakdown and surrounding erythema no drainage appreciated, hands and digits warm to palpation   Pulses: bounding radial pulses b/l                                                                            LABS:                        9.6    9.12  )-----------( 269      ( 10 Dec 2022 06:38 )             30.7     12-10    139  |  103  |  18  ----------------------------<  115<H>  4.2   |  33<H>  |  0.60    Ca    8.7      10 Dec 2022 06:38            Impression and Plan: 72 year old male with complicated hospital course as above now with hand wound    recommend wound care evaluation with Dr. Quintana  recommend hand specialist evaluation  recommend silvadene and dry gauze dressing.   discussed with Dr. Gonsalez no vascular surgical intervention at this time.

## 2022-12-11 NOTE — PROGRESS NOTE ADULT - ASSESSMENT
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 Unknown how long pt unresponsive for at home. Blood sugar in the 40s with EMS s/p glucagon. On arrival to ER pt hypothermic and agonally breathing. Pt became unresponsive with loss of pulse; ACLS initiated. PEA arrest with ROSC after approximately 5 min s/p Epi x2.  Pt intubated during CODE BLUE. Per son pt on the day prior to presentation reported low blood sugar reads on his glucometer in the range of 80s. Son believes pt continued to take his insulin and oral diabetic regimen. Son states that pt was eating but may have been eating less in the last few days. Later on pt noted to have tonic-clonic Sz.     PEA arrest  s/p Epi x 2. ROSC achieved  Takotsubo cardiomyopathy found on Echo   stable    Acute hypoxemic respiratory failure now chronic  s/p intubation , failed extubation, now trach to vent  plan:  c/w vent managment  pulm consulted  will need sutures removed    New onset Seizure likely due to anoxic brain injury  Cont Valproic + Keppra as per neuro  ELMER 2 to ATN; resolved    Shock liver.  due to PEA arrest. resolved    LUE edema and wound of left hand   vascular eval appreciated   --hand surgery consulted   --LUE a. and v. duplex ordered

## 2022-12-11 NOTE — CONSULT NOTE ADULT - SUBJECTIVE AND OBJECTIVE BOX
BRANDON CAMARILLO    [address] [address]    Allergies    No Known Allergies    Intolerances        PAST MEDICAL & SURGICAL HISTORY:  HTN (hypertension)      HLD (hyperlipidemia)      Diabetes mellitus      Lacunar infarction      BPH (benign prostatic hyperplasia)      CHF (congestive heart failure)      Chronic obstructive pulmonary disease, unspecified COPD type      S/P CABG (coronary artery bypass graft)  2014          FAMILY HISTORY:      Home Medications:  aspirin 81 mg oral delayed release tablet: 1 tab(s) orally once a day (12 Jun 2020 17:35)  Liquifilm Tears preserved ophthalmic solution: 1 drop(s) to each affected eye 2 times a day (12 Jun 2020 17:35)      MEDICATIONS  (STANDING):  aspirin  chewable 81 milliGRAM(s) Oral daily  chlorhexidine 0.12% Liquid 15 milliLiter(s) Oral Mucosa every 12 hours  chlorhexidine 2% Cloths 1 Application(s) Topical <User Schedule>  collagenase Ointment 1 Application(s) Topical two times a day  heparin   Injectable 5000 Unit(s) SubCutaneous every 8 hours  insulin glargine Injectable (LANTUS) 20 Unit(s) SubCutaneous every morning  insulin lispro (ADMELOG) corrective regimen sliding scale   SubCutaneous every 6 hours  levETIRAcetam  IVPB 1000 milliGRAM(s) IV Intermittent <User Schedule>  polyethylene glycol 3350 17 Gram(s) Oral daily  senna 2 Tablet(s) Oral at bedtime  valproic  acid Syrup 500 milliGRAM(s) Enteral Tube every 8 hours    MEDICATIONS  (PRN):  acetaminophen     Tablet .. 650 milliGRAM(s) Oral every 6 hours PRN Temp greater or equal to 38C (100.4F), Mild Pain (1 - 3)  traMADol 25 milliGRAM(s) Oral every 8 hours PRN Severe Pain (7 - 10)      Diet, NPO with Tube Feed:   Tube Feeding Modality: Gastrostomy  Vital AF 1.2  Total Volume for 24 Hours (mL): 1200  Continuous  Starting Tube Feed Rate mL per Hour: 45  Increase Tube Feed Rate by (mL): 5     Every 12 hours  Until Goal Tube Feed Rate (mL per Hour): 50  Tube Feed Duration (in Hours): 24  Tube Feed Start Time: 15:00 (12-08-22 @ 14:46) [Active]          Vital Signs Last 24 Hrs  T(C): 38.1 (11 Dec 2022 12:01), Max: 38.1 (11 Dec 2022 12:01)  T(F): 100.6 (11 Dec 2022 12:01), Max: 100.6 (11 Dec 2022 12:01)  HR: 71 (11 Dec 2022 12:01) (67 - 111)  BP: 132/76 (11 Dec 2022 12:01) (92/59 - 132/76)  BP(mean): --  RR: 18 (11 Dec 2022 12:01) (18 - 20)  SpO2: 99% (11 Dec 2022 12:01) (95% - 100%)    Parameters below as of 11 Dec 2022 12:01  Patient On (Oxygen Delivery Method): room air          12-10-22 @ 07:01  -  12-11-22 @ 07:00  --------------------------------------------------------  IN: 700 mL / OUT: 1301 mL / NET: -601 mL    12-11-22 @ 07:01  -  12-11-22 @ 14:35  --------------------------------------------------------  IN: 0 mL / OUT: 1 mL / NET: -1 mL        Mode: AC/ CMV (Assist Control/ Continuous Mandatory Ventilation), RR (machine): 14, TV (machine): 450, FiO2: 30, PEEP: 5, ITime: 1, MAP: 11, PIP: 25      LABS:                        9.6    9.12  )-----------( 269      ( 10 Dec 2022 06:38 )             30.7     12-10    139  |  103  |  18  ----------------------------<  115<H>  4.2   |  33<H>  |  0.60    Ca    8.7      10 Dec 2022 06:38                WBC:  WBC Count: 9.12 K/uL (12-10 @ 06:38)  WBC Count: 9.46 K/uL (12-09 @ 16:30)  WBC Count: 9.89 K/uL (12-08 @ 05:04)      MICROBIOLOGY:  RECENT CULTURES:                  Sodium:  Sodium, Serum: 139 mmol/L (12-10 @ 06:38)  Sodium, Serum: 140 mmol/L (12-09 @ 12:15)  Sodium, Serum: 140 mmol/L (12-08 @ 05:04)      0.60 mg/dL 12-10 @ 06:38  0.60 mg/dL 12-09 @ 12:15  0.69 mg/dL 12-08 @ 05:04      Hemoglobin:  Hemoglobin: 9.6 g/dL (12-10 @ 06:38)  Hemoglobin: 9.4 g/dL (12-09 @ 16:30)  Hemoglobin: 9.3 g/dL (12-08 @ 05:04)      Platelets: Platelet Count - Automated: 269 K/uL (12-10 @ 06:38)  Platelet Count - Automated: 168 K/uL (12-09 @ 16:30)  Platelet Count - Automated: 199 K/uL (12-08 @ 05:04)              RADIOLOGY & ADDITIONAL STUDIES:      MICROBIOLOGY:  RECENT CULTURES:

## 2022-12-11 NOTE — PROGRESS NOTE ADULT - SUBJECTIVE AND OBJECTIVE BOX
Patient seen and examined bedside   no overnight events     ROS: unable to examine due to [ ] Encephalopathy  [ ] Advanced Dementia  [ ] Expressive Aphasia  [x ] TRACH/VenT     Review of Systems: able to ROS throug vent  General:denies fever chills, headache, weakness  HEENT: denies blurry vision,diffculty swallowing, difficulty hearing, tinnitus  Cardiovascular: denies chest pain  ,palpitations  Pulmonary:denies shortness of breath, cough, wheezing, hemoptysis  Gastrointestinal: denies abdominal pain, constipation, diarrhea,nausea , vomiting, hematochezia  : denies hematuria, dysuria, or incontinence  Neurological: denies weakness, numbness , tingling, dizziness, tremors  MSK: denies muscle pain, difficulty ambulating, swelling, back pain  skin: denies skin rash, itching, burning, or  skin lesions  Psychiatrical: denies mood disturbances, anxierty, feeling depressed, depression , or difficulty sleeping    Objective:  Vital Signs Last 24 Hrs  T(C): 36.6 (11 Dec 2022 17:00), Max: 38.1 (11 Dec 2022 12:01)  T(F): 97.9 (11 Dec 2022 17:00), Max: 100.6 (11 Dec 2022 12:01)  HR: 87 (11 Dec 2022 17:00) (61 - 111)  BP: 116/74 (11 Dec 2022 17:00) (92/59 - 132/76)  BP(mean): --  RR: 20 (11 Dec 2022 17:00) (18 - 20)  SpO2: 100% (11 Dec 2022 17:00) (95% - 100%)    Parameters below as of 11 Dec 2022 17:00  Patient On (Oxygen Delivery Method): ventilator    Physical Exam:  GENERAL: NAD,  no increased WOB  HEAD:  Atraumatic, Normocephalic  EYES: EOMI, PERRLA, conjunctiva and sclera clear  ENMT: No tonsillar erythema, exudates, or enlargement; Moist mucous membranes   NECK: Supple, No JVD, +TRACH  NERVOUS SYSTEM:  awake and alert, slightly agitated at times , moving extremities spontaneously   CHEST/LUNG: CTAB;  No rales, rhonchi, wheezing, or rubs  HEART: Regular rate and rhythm; No murmurs, rubs, or gallops  ABDOMEN: Soft, Nontender, Nondistended; Bowel sounds present; +PEG   EXTREMITIES:   left hand with 2cm x3cm area of skin breakdown and surrounding erythema no drainage appreciated, hands and digits warm to palpation   +anasarca ;   LUE  edema +++  with bedside doppler good radial pulse of LUE         Labs:                                     9.6    9.12  )-----------( 269      ( 10 Dec 2022 06:38 )             30.7   12-10    139  |  103  |  18  ----------------------------<  115<H>  4.2   |  33<H>  |  0.60    Ca    8.7      10 Dec 2022 06:38          Active Medications  MEDICATIONS  (STANDING):  aspirin  chewable 81 milliGRAM(s) Oral daily  chlorhexidine 0.12% Liquid 15 milliLiter(s) Oral Mucosa every 12 hours  chlorhexidine 2% Cloths 1 Application(s) Topical <User Schedule>  collagenase Ointment 1 Application(s) Topical two times a day  heparin   Injectable 5000 Unit(s) SubCutaneous every 8 hours  insulin glargine Injectable (LANTUS) 20 Unit(s) SubCutaneous every morning  insulin lispro (ADMELOG) corrective regimen sliding scale   SubCutaneous every 6 hours  levETIRAcetam  IVPB 1000 milliGRAM(s) IV Intermittent <User Schedule>  polyethylene glycol 3350 17 Gram(s) Oral daily  senna 2 Tablet(s) Oral at bedtime  valproic  acid Syrup 500 milliGRAM(s) Enteral Tube every 8 hours    MEDICATIONS  (PRN):  acetaminophen     Tablet .. 650 milliGRAM(s) Oral every 6 hours PRN Temp greater or equal to 38C (100.4F), Mild Pain (1 - 3)  fentaNYL    Injectable 50 MICROGram(s) IV Push every 4 hours PRN Moderate Pain (4 - 6)    Consultant(s) Notes Reveiwed [x ] Yes     Care Discussed with [ x] Consultants  [x ] Patient  [ x] Family--son at bedside 12/11  [x ] /   [x ] Other; RN

## 2022-12-12 ENCOUNTER — TRANSCRIPTION ENCOUNTER (OUTPATIENT)
Age: 72
End: 2022-12-12

## 2022-12-12 LAB
ALBUMIN SERPL ELPH-MCNC: 1.8 G/DL — LOW (ref 3.3–5)
ALP SERPL-CCNC: 106 U/L — SIGNIFICANT CHANGE UP (ref 40–120)
ALT FLD-CCNC: 21 U/L — SIGNIFICANT CHANGE UP (ref 12–78)
ANION GAP SERPL CALC-SCNC: 4 MMOL/L — LOW (ref 5–17)
APTT BLD: 29.1 SEC — SIGNIFICANT CHANGE UP (ref 27.5–35.5)
APTT BLD: >200 SEC — CRITICAL HIGH (ref 27.5–35.5)
AST SERPL-CCNC: 20 U/L — SIGNIFICANT CHANGE UP (ref 15–37)
BASOPHILS # BLD AUTO: 0.08 K/UL — SIGNIFICANT CHANGE UP (ref 0–0.2)
BASOPHILS NFR BLD AUTO: 1 % — SIGNIFICANT CHANGE UP (ref 0–2)
BILIRUB SERPL-MCNC: 0.3 MG/DL — SIGNIFICANT CHANGE UP (ref 0.2–1.2)
BUN SERPL-MCNC: 21 MG/DL — SIGNIFICANT CHANGE UP (ref 7–23)
CALCIUM SERPL-MCNC: 8.5 MG/DL — SIGNIFICANT CHANGE UP (ref 8.5–10.1)
CHLORIDE SERPL-SCNC: 101 MMOL/L — SIGNIFICANT CHANGE UP (ref 96–108)
CO2 SERPL-SCNC: 32 MMOL/L — HIGH (ref 22–31)
CREAT SERPL-MCNC: 0.59 MG/DL — SIGNIFICANT CHANGE UP (ref 0.5–1.3)
EGFR: 102 ML/MIN/1.73M2 — SIGNIFICANT CHANGE UP
EOSINOPHIL # BLD AUTO: 0.28 K/UL — SIGNIFICANT CHANGE UP (ref 0–0.5)
EOSINOPHIL NFR BLD AUTO: 3.5 % — SIGNIFICANT CHANGE UP (ref 0–6)
GLUCOSE BLDC GLUCOMTR-MCNC: 140 MG/DL — HIGH (ref 70–99)
GLUCOSE BLDC GLUCOMTR-MCNC: 163 MG/DL — HIGH (ref 70–99)
GLUCOSE BLDC GLUCOMTR-MCNC: 194 MG/DL — HIGH (ref 70–99)
GLUCOSE BLDC GLUCOMTR-MCNC: 92 MG/DL — SIGNIFICANT CHANGE UP (ref 70–99)
GLUCOSE SERPL-MCNC: 159 MG/DL — HIGH (ref 70–99)
HCT VFR BLD CALC: 30.6 % — LOW (ref 39–50)
HCT VFR BLD CALC: 31.4 % — LOW (ref 39–50)
HGB BLD-MCNC: 9.5 G/DL — LOW (ref 13–17)
HGB BLD-MCNC: 9.9 G/DL — LOW (ref 13–17)
IMM GRANULOCYTES NFR BLD AUTO: 1.1 % — HIGH (ref 0–0.9)
INR BLD: 1.07 RATIO — SIGNIFICANT CHANGE UP (ref 0.88–1.16)
LYMPHOCYTES # BLD AUTO: 1.46 K/UL — SIGNIFICANT CHANGE UP (ref 1–3.3)
LYMPHOCYTES # BLD AUTO: 18.3 % — SIGNIFICANT CHANGE UP (ref 13–44)
MAGNESIUM SERPL-MCNC: 1.9 MG/DL — SIGNIFICANT CHANGE UP (ref 1.6–2.6)
MCHC RBC-ENTMCNC: 30.5 PG — SIGNIFICANT CHANGE UP (ref 27–34)
MCHC RBC-ENTMCNC: 30.6 PG — SIGNIFICANT CHANGE UP (ref 27–34)
MCHC RBC-ENTMCNC: 31 G/DL — LOW (ref 32–36)
MCHC RBC-ENTMCNC: 31.5 G/DL — LOW (ref 32–36)
MCV RBC AUTO: 96.6 FL — SIGNIFICANT CHANGE UP (ref 80–100)
MCV RBC AUTO: 98.7 FL — SIGNIFICANT CHANGE UP (ref 80–100)
MONOCYTES # BLD AUTO: 1.02 K/UL — HIGH (ref 0–0.9)
MONOCYTES NFR BLD AUTO: 12.8 % — SIGNIFICANT CHANGE UP (ref 2–14)
NEUTROPHILS # BLD AUTO: 5.03 K/UL — SIGNIFICANT CHANGE UP (ref 1.8–7.4)
NEUTROPHILS NFR BLD AUTO: 63.3 % — SIGNIFICANT CHANGE UP (ref 43–77)
NRBC # BLD: 0 /100 WBCS — SIGNIFICANT CHANGE UP (ref 0–0)
NRBC # BLD: 0 /100 WBCS — SIGNIFICANT CHANGE UP (ref 0–0)
PHOSPHATE SERPL-MCNC: 2.2 MG/DL — LOW (ref 2.5–4.5)
PLATELET # BLD AUTO: 248 K/UL — SIGNIFICANT CHANGE UP (ref 150–400)
PLATELET # BLD AUTO: 256 K/UL — SIGNIFICANT CHANGE UP (ref 150–400)
POTASSIUM SERPL-MCNC: 4.2 MMOL/L — SIGNIFICANT CHANGE UP (ref 3.5–5.3)
POTASSIUM SERPL-SCNC: 4.2 MMOL/L — SIGNIFICANT CHANGE UP (ref 3.5–5.3)
PROT SERPL-MCNC: 6.2 GM/DL — SIGNIFICANT CHANGE UP (ref 6–8.3)
PROTHROM AB SERPL-ACNC: 12.7 SEC — SIGNIFICANT CHANGE UP (ref 10.5–13.4)
RBC # BLD: 3.1 M/UL — LOW (ref 4.2–5.8)
RBC # BLD: 3.25 M/UL — LOW (ref 4.2–5.8)
RBC # FLD: 14.3 % — SIGNIFICANT CHANGE UP (ref 10.3–14.5)
RBC # FLD: 14.6 % — HIGH (ref 10.3–14.5)
SODIUM SERPL-SCNC: 137 MMOL/L — SIGNIFICANT CHANGE UP (ref 135–145)
WBC # BLD: 7.96 K/UL — SIGNIFICANT CHANGE UP (ref 3.8–10.5)
WBC # BLD: 7.96 K/UL — SIGNIFICANT CHANGE UP (ref 3.8–10.5)
WBC # FLD AUTO: 7.96 K/UL — SIGNIFICANT CHANGE UP (ref 3.8–10.5)
WBC # FLD AUTO: 7.96 K/UL — SIGNIFICANT CHANGE UP (ref 3.8–10.5)

## 2022-12-12 PROCEDURE — 99233 SBSQ HOSP IP/OBS HIGH 50: CPT

## 2022-12-12 PROCEDURE — 76937 US GUIDE VASCULAR ACCESS: CPT | Mod: 26

## 2022-12-12 PROCEDURE — 99232 SBSQ HOSP IP/OBS MODERATE 35: CPT

## 2022-12-12 PROCEDURE — 36410 VNPNXR 3YR/> PHY/QHP DX/THER: CPT

## 2022-12-12 RX ORDER — HEPARIN SODIUM 5000 [USP'U]/ML
7000 INJECTION INTRAVENOUS; SUBCUTANEOUS EVERY 6 HOURS
Refills: 0 | Status: DISCONTINUED | OUTPATIENT
Start: 2022-12-12 | End: 2022-12-15

## 2022-12-12 RX ORDER — SODIUM,POTASSIUM PHOSPHATES 278-250MG
2 POWDER IN PACKET (EA) ORAL EVERY 6 HOURS
Refills: 0 | Status: COMPLETED | OUTPATIENT
Start: 2022-12-12 | End: 2022-12-13

## 2022-12-12 RX ORDER — HEPARIN SODIUM 5000 [USP'U]/ML
INJECTION INTRAVENOUS; SUBCUTANEOUS
Qty: 25000 | Refills: 0 | Status: DISCONTINUED | OUTPATIENT
Start: 2022-12-12 | End: 2022-12-15

## 2022-12-12 RX ORDER — LEVETIRACETAM 250 MG/1
1500 TABLET, FILM COATED ORAL
Refills: 0 | Status: DISCONTINUED | OUTPATIENT
Start: 2022-12-12 | End: 2023-02-17

## 2022-12-12 RX ORDER — LEVETIRACETAM 250 MG/1
500 TABLET, FILM COATED ORAL
Refills: 0 | Status: DISCONTINUED | OUTPATIENT
Start: 2022-12-12 | End: 2022-12-12

## 2022-12-12 RX ORDER — HEPARIN SODIUM 5000 [USP'U]/ML
7000 INJECTION INTRAVENOUS; SUBCUTANEOUS ONCE
Refills: 0 | Status: COMPLETED | OUTPATIENT
Start: 2022-12-12 | End: 2022-12-12

## 2022-12-12 RX ORDER — HEPARIN SODIUM 5000 [USP'U]/ML
3500 INJECTION INTRAVENOUS; SUBCUTANEOUS EVERY 6 HOURS
Refills: 0 | Status: DISCONTINUED | OUTPATIENT
Start: 2022-12-12 | End: 2022-12-15

## 2022-12-12 RX ADMIN — HEPARIN SODIUM 1600 UNIT(S)/HR: 5000 INJECTION INTRAVENOUS; SUBCUTANEOUS at 10:23

## 2022-12-12 RX ADMIN — Medication 1 APPLICATION(S): at 17:25

## 2022-12-12 RX ADMIN — Medication 500 MILLIGRAM(S): at 11:20

## 2022-12-12 RX ADMIN — Medication 2 PACKET(S): at 17:24

## 2022-12-12 RX ADMIN — Medication 500 MILLIGRAM(S): at 23:26

## 2022-12-12 RX ADMIN — Medication 500 MILLIGRAM(S): at 05:03

## 2022-12-12 RX ADMIN — CHLORHEXIDINE GLUCONATE 1 APPLICATION(S): 213 SOLUTION TOPICAL at 05:04

## 2022-12-12 RX ADMIN — CHLORHEXIDINE GLUCONATE 15 MILLILITER(S): 213 SOLUTION TOPICAL at 05:04

## 2022-12-12 RX ADMIN — CHLORHEXIDINE GLUCONATE 15 MILLILITER(S): 213 SOLUTION TOPICAL at 17:24

## 2022-12-12 RX ADMIN — POLYETHYLENE GLYCOL 3350 17 GRAM(S): 17 POWDER, FOR SOLUTION ORAL at 11:20

## 2022-12-12 RX ADMIN — Medication 500 MILLIGRAM(S): at 15:31

## 2022-12-12 RX ADMIN — Medication 500 MILLIGRAM(S): at 21:35

## 2022-12-12 RX ADMIN — LEVETIRACETAM 1500 MILLIGRAM(S): 250 TABLET, FILM COATED ORAL at 17:46

## 2022-12-12 RX ADMIN — Medication 500 MILLIGRAM(S): at 17:46

## 2022-12-12 RX ADMIN — HEPARIN SODIUM 1600 UNIT(S)/HR: 5000 INJECTION INTRAVENOUS; SUBCUTANEOUS at 19:28

## 2022-12-12 RX ADMIN — LEVETIRACETAM 400 MILLIGRAM(S): 250 TABLET, FILM COATED ORAL at 05:03

## 2022-12-12 RX ADMIN — Medication 2: at 11:16

## 2022-12-12 RX ADMIN — HEPARIN SODIUM 7000 UNIT(S): 5000 INJECTION INTRAVENOUS; SUBCUTANEOUS at 10:12

## 2022-12-12 RX ADMIN — Medication 2 PACKET(S): at 23:25

## 2022-12-12 RX ADMIN — Medication 81 MILLIGRAM(S): at 11:21

## 2022-12-12 RX ADMIN — Medication 1 APPLICATION(S): at 06:46

## 2022-12-12 RX ADMIN — Medication 2: at 07:11

## 2022-12-12 RX ADMIN — Medication 2 PACKET(S): at 15:31

## 2022-12-12 RX ADMIN — HEPARIN SODIUM 1300 UNIT(S)/HR: 5000 INJECTION INTRAVENOUS; SUBCUTANEOUS at 22:00

## 2022-12-12 RX ADMIN — HEPARIN SODIUM 0 UNIT(S)/HR: 5000 INJECTION INTRAVENOUS; SUBCUTANEOUS at 20:57

## 2022-12-12 RX ADMIN — Medication 1 APPLICATION(S): at 17:24

## 2022-12-12 RX ADMIN — INSULIN GLARGINE 20 UNIT(S): 100 INJECTION, SOLUTION SUBCUTANEOUS at 08:15

## 2022-12-12 RX ADMIN — Medication 1 APPLICATION(S): at 05:03

## 2022-12-12 RX ADMIN — SENNA PLUS 2 TABLET(S): 8.6 TABLET ORAL at 21:35

## 2022-12-12 RX ADMIN — HEPARIN SODIUM 5000 UNIT(S): 5000 INJECTION INTRAVENOUS; SUBCUTANEOUS at 05:03

## 2022-12-12 NOTE — PROGRESS NOTE ADULT - ASSESSMENT
72 year old male New finding of left subclavian DVT, and hand wound    Continue Heparin GTT for DVT  Recommend plastic surgery for hand wound  Will continue to monitor  Discussed with Dr. Obrien

## 2022-12-12 NOTE — CHART NOTE - NSCHARTNOTEFT_GEN_A_CORE
Patient seen at beside with Dr. Perdomo. Tracheostomy sutures removed, 4 in total; 2 prolene, 2 silk.  Patient tolerated well, tracheostomy dressing changed, now C/D/I.

## 2022-12-12 NOTE — PROGRESS NOTE ADULT - ASSESSMENT
REVIEW OF SYMPTOMS      Able to give (reliable) ROS  NO     PHYSICAL EXAM    HEENT Unremarkable  atraumatic   RESP Fair air entry EXP prolonged    Harsh breath sound Resp distres mild   CARDIAC S1 S2 No S3     NO JVD    ABDOMEN SOFT BS PRESENT NOT DISTENDED No hepatosplenomegaly   PEDAL EDEMA present No calf tenderness  NO rash       GENERAL DATA .   GOC.  12/11/2022 full code       ALLGY.  nka                            WT.   ..  12/2/2022 86  BMI.   ..    12/2/2022 29                          ICU STAY.   .. 11/29-12/9  COVID.   .. 12/2/2022 scv2 (-)   ..  11/20/2022 scv2 (-)   BEST PRACTICE ISSUES.    HOB ELEVATN. Yes  DVT PPLX.   12/12/2022 iv ufh Dr RANDALL ( l sc thrombus)    ALEJO PPLX.   ..      INFN PPLX.   ..  11/25 chlorhex .12%   .. 11/14 chlorhex 2%   SP SW ANTWAN.         DIET.    ..  12/8 vital 1.2 1200 gt   IV fl.  ..            PROCEDURES.  .. 12/5/2022 trach  .. 12/5/2022 peg       ABGS.  12/3/2022 ac 14/450/.3/5 750/46/75      VS/ PO/IO/ VENT/ DRIPS.   12/12/2022 afeb 86 110/50   12/12/2022 ac 14/450/5/.3     PATIENT PRESENTATION.  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 Unknown how long pt unresponsive for at home. Blood sugar in the 40s with EMS s/p glucagon. On arrival to ER pt hypothermic and agonally breathing. Pt became unresponsive with loss of pulse; ACLS initiated. PEA arrest with ROSC after approximately 5 min s/p Epi x2.  Pt intubated during CODE BLUE. Per son pt on the day prior to presentation reported low blood sugar reads on his glucometer in the range of 80s. Son believes pt continued to take his insulin and oral diabetic regimen. Son states that pt was eating but may have been eating less in the last few days. Later on pt noted to have tonic-clonic Sz. ICU dx: 1) Hypoglycemia 2) PEA arrest 3) Takotsubo cardiomyopathy found on Echo 4) New onset Sz either 2 to hypoglycemia vs anoxic injury 5) ELMER 2 to ATN 6) Shock liver. Pt with improvement in MS. s/p trach/PEG on 12/5  Pt downgraded to floor  Pulm consulted 12/11/2022       EVENTS.  12/12/2022 iv ufh started Dr RANDALL    PROBLEMS.  Sp  cac 11/14/2022   .. 11/14/2022 cac preceded by hypoglycemia followed by anoxic encephalopathy   vent management.   VTE.  .. 12/4 v duplx (-)  .. 11/14 cta ch no pe ac fx lat r rib 4-6 sub cm l lo lobe nodules   .. 12/12/2022 v duplx lue  occlusive thrombosis l mid subclav  partial slow flow in lat subclav   .. 12/12/2022 iv ufh  Trach 12/5   Infection.  .. w 12/10 w 9.1   .. cxr 12/6 trach resolutn of plate atelectasis l base   .. mrsa 11/26 (-)   Cellulitis l hand .  .. 12/12/2022 cephalexin 500.4 x 5d   CAD.  .. PMH CABG 2014 Stent 2015   .. 11/14/2022 ekg paced qtc 568   .. 11/14 asa 81   CHF.   .. 11/14/2022 echo mod decr segmental lvsf apical lateral apiccal ant segment are abn takotsubo cmpthy pasp 42 .  anemia.  .. Hb 12/10-12/11 Hb 9.6 - 9.5   CONSTIPN.  .. 11/21 miralax   .. 11/21 senna   .. 11/20 keppra   DM.  .. 11/21 insulin  DECUB.  .. 12/2 collagenase   SEIZURES.  .. 11/23 valproic 500.3      ASSESSMENT RECOMMENDATIONS..  Sp  cac 11/14/2022   .. Monitor neuro signs of recovery  thoiugh seems unlikely   VTE.  .. 12/12/2022 Pt found to have l subclav dvt    12/12/2022 iv ufh startd   Trach 12/5   .. trach care   vent management.  .. vent bundle dsbt dsv target pplat 30 (-) PO2 60 (+) pH 730 (+)   Infection.  ..  Monitor for vap    cellulitis hand   .. 12/12/2022 started on 5 d course cephalexin for cellulitis   CAD.  .. Monitor   CHF.   .. 11/14/2022 echo mod decr segmental lvsf apical lateral apiccal ant segment are abn takotsubo cmpthy pasp 42 .  .. monitor for chf   anemia.  .. Monitor  Target Hb 7 (+)   DECUB.  .. 12/2 collagenase   SEIZURES.  .. sz meds as guided by neuro     TIME SPENT   Over 25 minutes aggregate care time spent on encounter; activities included   direct patient care, counseling and/or coordinating care reviewing notes, lab data/ imaging , discussion with multidisciplinary team/ patient  /family and explaining in detail risks, benefits, alternatives  of the recommendations     MARQUISE TAYLOR 72 m 11/14/2022 1948 Dr Leela Dowling

## 2022-12-12 NOTE — PROGRESS NOTE ADULT - SUBJECTIVE AND OBJECTIVE BOX
Patient seen and examined bedside   no overnight events   patient communicated with his hands d/t being trached     ROS: unable to examine due to [ ] Encephalopathy  [ ] Advanced Dementia  [ ] Expressive Aphasia  [x ] TRACH/VenT     Review of Systems: able to ROS throug vent  General:denies fever chills, headache, weakness  HEENT: denies blurry vision,diffculty swallowing, difficulty hearing, tinnitus  Cardiovascular: denies chest pain  ,palpitations  Pulmonary:denies shortness of breath, cough, wheezing, hemoptysis  Gastrointestinal: denies abdominal pain, constipation, diarrhea,nausea , vomiting, hematochezia  : denies hematuria, dysuria, or incontinence  Neurological: denies weakness, numbness , tingling, dizziness, tremors  MSK: denies muscle pain, difficulty ambulating, swelling, back pain  skin: denies skin rash, itching, burning, or  skin lesions  Psychiatrical: denies mood disturbances, anxierty, feeling depressed, depression , or difficulty sleeping    Objective:  Vital Signs Last 24 Hrs  T(C): 37.3 (12 Dec 2022 04:59), Max: 38.1 (11 Dec 2022 12:01)  T(F): 99.2 (12 Dec 2022 04:59), Max: 100.6 (11 Dec 2022 12:01)  HR: 88 (12 Dec 2022 06:00) (61 - 105)  BP: 147/91 (12 Dec 2022 04:59) (116/74 - 147/91)  BP(mean): --  RR: 16 (12 Dec 2022 04:59) (16 - 20)  SpO2: 100% (12 Dec 2022 06:00) (97% - 100%)    Parameters below as of 11 Dec 2022 19:50  Patient On (Oxygen Delivery Method): ventilator      Physical Exam:  GENERAL: NAD,  no increased WOB  HEAD:  Atraumatic, Normocephalic  EYES: EOMI, PERRLA, conjunctiva and sclera clear  ENMT: No tonsillar erythema, exudates, or enlargement; Moist mucous membranes   NECK: Supple, No JVD, +TRACH  NERVOUS SYSTEM:  awake and alert, slightly agitated at times , moving extremities spontaneously   CHEST/LUNG: CTAB;  No rales, rhonchi, wheezing, or rubs  HEART: Regular rate and rhythm; No murmurs, rubs, or gallops  ABDOMEN: Soft, Nontender, Nondistended; Bowel sounds present; +PEG   EXTREMITIES:   left hand with 2cm x3cm area of skin breakdown and surrounding erythema no drainage appreciated, hands and digits warm to palpation   +anasarca ;   LUE  edema +++  with bedside doppler good radial pulse of LUE         Labs:                          9.9    7.96  )-----------( 256      ( 12 Dec 2022 06:35 )             31.4   12-12    137  |  101  |  21  ----------------------------<  159<H>  4.2   |  32<H>  |  0.59    Ca    8.5      12 Dec 2022 06:35  Phos  2.2     12-12  Mg     1.9     12-12    TPro  6.2  /  Alb  1.8<L>  /  TBili  0.3  /  DBili  x   /  AST  20  /  ALT  21  /  AlkPhos  106  12-12        Active Medications  MEDICATIONS  (STANDING):  aspirin  chewable 81 milliGRAM(s) Oral daily  chlorhexidine 0.12% Liquid 15 milliLiter(s) Oral Mucosa every 12 hours  chlorhexidine 2% Cloths 1 Application(s) Topical <User Schedule>  collagenase Ointment 1 Application(s) Topical two times a day  heparin   Injectable 5000 Unit(s) SubCutaneous every 8 hours  insulin glargine Injectable (LANTUS) 20 Unit(s) SubCutaneous every morning  insulin lispro (ADMELOG) corrective regimen sliding scale   SubCutaneous every 6 hours  levETIRAcetam  IVPB 1000 milliGRAM(s) IV Intermittent <User Schedule>  polyethylene glycol 3350 17 Gram(s) Oral daily  senna 2 Tablet(s) Oral at bedtime  valproic  acid Syrup 500 milliGRAM(s) Enteral Tube every 8 hours    MEDICATIONS  (PRN):  acetaminophen     Tablet .. 650 milliGRAM(s) Oral every 6 hours PRN Temp greater or equal to 38C (100.4F), Mild Pain (1 - 3)  fentaNYL    Injectable 50 MICROGram(s) IV Push every 4 hours PRN Moderate Pain (4 - 6)    Consultant(s) Notes Reveiwed [x ] Yes     Care Discussed with [ x] Consultants  [x ] Patient  [ x] Family--son at bedside   [x ] /   [x ] Other; RN

## 2022-12-12 NOTE — PROGRESS NOTE ADULT - SUBJECTIVE AND OBJECTIVE BOX
Patient seen and examined at bedside, alert, resting comfortably, Trach to vent        MEDICATIONS  (STANDING):  aspirin  chewable 81 milliGRAM(s) Oral daily  cephalexin 500 milliGRAM(s) Oral four times a day  chlorhexidine 0.12% Liquid 15 milliLiter(s) Oral Mucosa every 12 hours  chlorhexidine 2% Cloths 1 Application(s) Topical <User Schedule>  collagenase Ointment 1 Application(s) Topical two times a day  heparin  Infusion.  Unit(s)/Hr (16 mL/Hr) IV Continuous <Continuous>  insulin glargine Injectable (LANTUS) 20 Unit(s) SubCutaneous every morning  insulin lispro (ADMELOG) corrective regimen sliding scale   SubCutaneous every 6 hours  levETIRAcetam 1500 milliGRAM(s) Oral two times a day  polyethylene glycol 3350 17 Gram(s) Oral daily  potassium phosphate / sodium phosphate Powder (PHOS-NaK) 2 Packet(s) Oral every 6 hours  senna 2 Tablet(s) Oral at bedtime  silver sulfADIAZINE 1% Cream 1 Application(s) Topical two times a day  valproic  acid Syrup 500 milliGRAM(s) Enteral Tube every 8 hours    MEDICATIONS  (PRN):  acetaminophen     Tablet .. 650 milliGRAM(s) Oral every 6 hours PRN Temp greater or equal to 38C (100.4F), Mild Pain (1 - 3)  aluminum hydroxide/magnesium hydroxide/simethicone Suspension 30 milliLiter(s) Oral every 4 hours PRN Dyspepsia  heparin   Injectable 7000 Unit(s) IV Push every 6 hours PRN For aPTT less than 40  heparin   Injectable 3500 Unit(s) IV Push every 6 hours PRN For aPTT between 40 - 57  traMADol 25 milliGRAM(s) Oral every 8 hours PRN Severe Pain (7 - 10)      Vital Signs Last 24 Hrs  T(C): 37.7 (12 Dec 2022 15:58), Max: 37.7 (12 Dec 2022 15:58)  T(F): 99.8 (12 Dec 2022 15:58), Max: 99.8 (12 Dec 2022 15:58)  HR: 90 (12 Dec 2022 17:47) (74 - 94)  BP: 138/79 (12 Dec 2022 15:58) (111/58 - 147/91)  RR: 20 (12 Dec 2022 11:13) (16 - 20)  SpO2: 99% (12 Dec 2022 17:47) (97% - 100%)    Parameters below as of 12 Dec 2022 11:13  Patient On (Oxygen Delivery Method): ventilator      PHYSICAL EXAM:  General: NAD  Neuro:  Alert & oriented x 3  HEENT: NCAT, EOMI, conjunctiva clear  CV: +S1+S2  Lung: clear to ausculation bilaterally  Abdomen: soft, NTND. Normoactive BS. PEG in place  Extremities: LUE with 2+edema, FROM, wound present on L hand.      LABS:                        9.9    7.96  )-----------( 256      ( 12 Dec 2022 06:35 )             31.4     12-12    137  |  101  |  21  ----------------------------<  159<H>  4.2   |  32<H>  |  0.59    Ca    8.5      12 Dec 2022 06:35  Phos  2.2     12-12  Mg     1.9     12-12    TPro  6.2  /  Alb  1.8<L>  /  TBili  0.3  /  DBili  x   /  AST  20  /  ALT  21  /  AlkPhos  106  12-12    PT/INR - ( 12 Dec 2022 08:25 )   PT: 12.7 sec;   INR: 1.07 ratio         PTT - ( 12 Dec 2022 08:25 )  PTT:29.1 sec      RADIOLOGY:  < from: US Duplex Venous [facility], Left (12.11.22 @ 17:32) >    ACC: [id number] EXAM:  US DPLX [facility] EXT VEINS LTD LT                          PROCEDURE DATE:  12/11/2022          INTERPRETATION:  CLINICAL INFORMATION: Left arm swelling    COMPARISON: None available.    TECHNIQUE: Duplex sonography of the LEFT UPPER extremity veins with color   and spectral Doppler, with and without compression.    FINDINGS:    There is occlusive thrombus in the left mid subclavian vein with slow   flow detected in the lateral subclavian vein.  The left internal jugular, axillary and brachial veins are patent and   compressible where applicable.  The basilic and cephalic veins   (superficial veins) are patent and without thrombus.    Doppler examination shows normal spontaneous and phasic flow.    IMPRESSION:  There is occlusive thrombus in the left mid subclavian vein with partial   slow flow detected in the lateral subclavian vein.

## 2022-12-12 NOTE — PROGRESS NOTE ADULT - SUBJECTIVE AND OBJECTIVE BOX
BRANDON CAMARILLO    [facility] [address] [address]    Allergies    No Known Allergies    Intolerances        PAST MEDICAL & SURGICAL HISTORY:  HTN (hypertension)      HLD (hyperlipidemia)      Diabetes mellitus      Lacunar infarction      BPH (benign prostatic hyperplasia)      CHF (congestive heart failure)      Chronic obstructive pulmonary disease, unspecified COPD type      S/P CABG (coronary artery bypass graft)  2014          FAMILY HISTORY:      Home Medications:  aspirin 81 mg oral delayed release tablet: 1 tab(s) orally once a day (12 Jun 2020 17:35)  Liquifilm Tears preserved ophthalmic solution: 1 drop(s) to each affected eye 2 times a day (12 Jun 2020 17:35)      MEDICATIONS  (STANDING):  aspirin  chewable 81 milliGRAM(s) Oral daily  cephalexin 500 milliGRAM(s) Oral four times a day  chlorhexidine 0.12% Liquid 15 milliLiter(s) Oral Mucosa every 12 hours  chlorhexidine 2% Cloths 1 Application(s) Topical <User Schedule>  collagenase Ointment 1 Application(s) Topical two times a day  heparin   Injectable 5000 Unit(s) SubCutaneous every 8 hours  insulin glargine Injectable (LANTUS) 20 Unit(s) SubCutaneous every morning  insulin lispro (ADMELOG) corrective regimen sliding scale   SubCutaneous every 6 hours  levETIRAcetam  IVPB 1000 milliGRAM(s) IV Intermittent <User Schedule>  polyethylene glycol 3350 17 Gram(s) Oral daily  senna 2 Tablet(s) Oral at bedtime  silver sulfADIAZINE 1% Cream 1 Application(s) Topical two times a day  valproic  acid Syrup 500 milliGRAM(s) Enteral Tube every 8 hours    MEDICATIONS  (PRN):  acetaminophen     Tablet .. 650 milliGRAM(s) Oral every 6 hours PRN Temp greater or equal to 38C (100.4F), Mild Pain (1 - 3)  traMADol 25 milliGRAM(s) Oral every 8 hours PRN Severe Pain (7 - 10)      Diet, NPO with Tube Feed:   Tube Feeding Modality: Gastrostomy  Vital AF 1.2  Total Volume for 24 Hours (mL): 1200  Continuous  Starting Tube Feed Rate mL per Hour: 45  Increase Tube Feed Rate by (mL): 5     Every 12 hours  Until Goal Tube Feed Rate (mL per Hour): 50  Tube Feed Duration (in Hours): 24  Tube Feed Start Time: 15:00 (12-08-22 @ 14:46) [Active]          Vital Signs Last 24 Hrs  T(C): 37.3 (12 Dec 2022 04:59), Max: 38.1 (11 Dec 2022 12:01)  T(F): 99.2 (12 Dec 2022 04:59), Max: 100.6 (11 Dec 2022 12:01)  HR: 88 (12 Dec 2022 06:00) (61 - 105)  BP: 147/91 (12 Dec 2022 04:59) (116/74 - 147/91)  BP(mean): --  RR: 16 (12 Dec 2022 04:59) (16 - 20)  SpO2: 100% (12 Dec 2022 06:00) (97% - 100%)    Parameters below as of 11 Dec 2022 19:50  Patient On (Oxygen Delivery Method): ventilator          12-11-22 @ 07:01  -  12-12-22 @ 07:00  --------------------------------------------------------  IN: 2200 mL / OUT: 1 mL / NET: 2199 mL        Mode: AC/ CMV (Assist Control/ Continuous Mandatory Ventilation), RR (machine): 14, TV (machine): 450, FiO2: 30, PEEP: 5, ITime: 1, MAP: 10, PIP: 23      LABS:                        9.9    7.96  )-----------( 256      ( 12 Dec 2022 06:35 )             31.4     12-12    137  |  101  |  21  ----------------------------<  159<H>  4.2   |  32<H>  |  0.59    Ca    8.5      12 Dec 2022 06:35  Mg     1.9     12-12    TPro  x   /  Alb  1.8<L>  /  TBili  x   /  DBili  x   /  AST  20  /  ALT  21  /  AlkPhos  x   12-12    PT/INR - ( 12 Dec 2022 08:25 )   PT: 12.7 sec;   INR: 1.07 ratio                   WBC:  WBC Count: 7.96 K/uL (12-12 @ 06:35)  WBC Count: 9.12 K/uL (12-10 @ 06:38)  WBC Count: 9.46 K/uL (12-09 @ 16:30)      MICROBIOLOGY:  RECENT CULTURES:              PT/INR - ( 12 Dec 2022 08:25 )   PT: 12.7 sec;   INR: 1.07 ratio             Sodium:  Sodium, Serum: 137 mmol/L (12-12 @ 06:35)  Sodium, Serum: 139 mmol/L (12-10 @ 06:38)  Sodium, Serum: 140 mmol/L (12-09 @ 12:15)      0.59 mg/dL 12-12 @ 06:35  0.60 mg/dL 12-10 @ 06:38  0.60 mg/dL 12-09 @ 12:15      Hemoglobin:  Hemoglobin: 9.9 g/dL (12-12 @ 06:35)  Hemoglobin: 9.6 g/dL (12-10 @ 06:38)  Hemoglobin: 9.4 g/dL (12-09 @ 16:30)      Platelets: Platelet Count - Automated: 256 K/uL (12-12 @ 06:35)  Platelet Count - Automated: 269 K/uL (12-10 @ 06:38)  Platelet Count - Automated: 168 K/uL (12-09 @ 16:30)      LIVER FUNCTIONS - ( 12 Dec 2022 06:35 )  Alb: 1.8 g/dL / Pro: x     / ALK PHOS: x     / ALT: 21 U/L / AST: 20 U/L / GGT: x                 RADIOLOGY & ADDITIONAL STUDIES:      MICROBIOLOGY:  RECENT CULTURES:

## 2022-12-12 NOTE — PROGRESS NOTE ADULT - ASSESSMENT
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 Unknown how long pt unresponsive for at home. Blood sugar in the 40s with EMS s/p glucagon. On arrival to ER pt hypothermic and agonally breathing. Pt became unresponsive with loss of pulse; ACLS initiated. PEA arrest with ROSC after approximately 5 min s/p Epi x2.  Pt intubated during CODE BLUE. Per son pt on the day prior to presentation reported low blood sugar reads on his glucometer in the range of 80s. Son believes pt continued to take his insulin and oral diabetic regimen. Son states that pt was eating but may have been eating less in the last few days. Later on pt noted to have tonic-clonic Sz.     PEA arrest  s/p Epi x 2. ROSC achieved  Takotsubo cardiomyopathy found on Echo   stable    Acute hypoxemic respiratory failure now chronic  s/p intubation , failed extubation, now trach to vent  c/w vent managment  pulm consulted  will need sutures removed    LUE VTE   LUE edema and wound of left hand ; good radial pulse   vascular eval appreciated   --hand surgery consulted   --LUE venous duplex : occlusive thrombus in the left mid subclavian vein with partial   slow flow detected in the lateral subclavian vein.  --LUE arterial Duplex neg   --started heparin drip, awaiting vascular follow-up     New onset Seizure likely due to anoxic brain injury  Cont Valproic + Keppra as per neuro    ELMER 2 to ATN; resolved    Shock liver.  due to PEA arrest. resolved    DM2   A1c 8.8%  continue with current management   FS goal 140-180   continue with PEG feeds     Hypophosphatemia --repleted     Normocytic anemia   no e/o bleeding or bruising   H/H stable   follow-up anemia labs       Preventative measures   heparin gtt --dvt ppx  fall, aspiration  precautions   HOBE        Pt is a 71 yo M with h/o COPD, CAD s/p PCI with stent 2015 and CABG 2014, chronic diastolic heart failure (EF 50%), 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 Unknown how long pt unresponsive for at home. Blood sugar in the 40s with EMS s/p glucagon. On arrival to ER pt hypothermic and agonally breathing. Pt became unresponsive with loss of pulse; ACLS initiated. PEA arrest with ROSC after approximately 5 min s/p Epi x2.  Pt intubated during CODE BLUE. Per son pt on the day prior to presentation reported low blood sugar reads on his glucometer in the range of 80s. Son believes pt continued to take his insulin and oral diabetic regimen. Son states that pt was eating but may have been eating less in the last few days. Later on pt noted to have tonic-clonic Sz.     PEA arrest  s/p Epi x 2. ROSC achieved  Takotsubo cardiomyopathy found on Echo   stable    Acute hypoxemic respiratory failure now chronic  s/p intubation , failed extubation, now trach to vent  c/w vent managment  pulm consulted  will need sutures removed    LUE VTE   LUE edema and wound of left hand ; good radial pulse   vascular eval appreciated   --hand surgery consulted   --LUE venous duplex : occlusive thrombus in the left mid subclavian vein with partial   slow flow detected in the lateral subclavian vein.  --LUE arterial Duplex neg   --started heparin drip, awaiting vascular follow-up     New onset Seizure likely due to anoxic brain injury  discussed with Dr. Loo, patient will be only on Keppra 1500mg bid   off vimpat and Depakote   monitor     ELMER 2 to ATN; resolved    Shock liver.  due to PEA arrest. resolved    DM2   A1c 8.8%  continue with current management   FS goal 140-180   continue with PEG feeds     Hypophosphatemia --repleted     Normocytic anemia   no e/o bleeding or bruising   H/H stable   follow-up anemia labs       Preventative measures   heparin gtt --dvt ppx  fall, aspiration  precautions   HOBE

## 2022-12-12 NOTE — PROCEDURE NOTE - NSPROCDETAILS_GEN_ALL_CORE
Ultrasound guidance/location identified, draped/prepped, sterile technique used/blood seen on insertion/dressing applied/flushes easily/secured in place/sterile technique, catheter placed
location identified, draped/prepped, sterile technique used
blood seen on insertion/dressing applied/flushes easily

## 2022-12-13 DIAGNOSIS — S61.402A UNSPECIFIED OPEN WOUND OF LEFT HAND, INITIAL ENCOUNTER: ICD-10-CM

## 2022-12-13 LAB
ALBUMIN SERPL ELPH-MCNC: 1.8 G/DL — LOW (ref 3.3–5)
ALP SERPL-CCNC: 96 U/L — SIGNIFICANT CHANGE UP (ref 40–120)
ALT FLD-CCNC: 17 U/L — SIGNIFICANT CHANGE UP (ref 12–78)
ANION GAP SERPL CALC-SCNC: 4 MMOL/L — LOW (ref 5–17)
APTT BLD: 124.2 SEC — CRITICAL HIGH (ref 27.5–35.5)
APTT BLD: 52.8 SEC — HIGH (ref 27.5–35.5)
APTT BLD: 57.9 SEC — HIGH (ref 27.5–35.5)
AST SERPL-CCNC: 16 U/L — SIGNIFICANT CHANGE UP (ref 15–37)
BILIRUB SERPL-MCNC: 0.4 MG/DL — SIGNIFICANT CHANGE UP (ref 0.2–1.2)
BUN SERPL-MCNC: 20 MG/DL — SIGNIFICANT CHANGE UP (ref 7–23)
CALCIUM SERPL-MCNC: 8.6 MG/DL — SIGNIFICANT CHANGE UP (ref 8.5–10.1)
CHLORIDE SERPL-SCNC: 100 MMOL/L — SIGNIFICANT CHANGE UP (ref 96–108)
CO2 SERPL-SCNC: 35 MMOL/L — HIGH (ref 22–31)
CREAT SERPL-MCNC: 0.62 MG/DL — SIGNIFICANT CHANGE UP (ref 0.5–1.3)
EGFR: 100 ML/MIN/1.73M2 — SIGNIFICANT CHANGE UP
FERRITIN SERPL-MCNC: 536 NG/ML — HIGH (ref 30–400)
FOLATE SERPL-MCNC: 9.4 NG/ML — SIGNIFICANT CHANGE UP
GLUCOSE BLDC GLUCOMTR-MCNC: 126 MG/DL — HIGH (ref 70–99)
GLUCOSE BLDC GLUCOMTR-MCNC: 146 MG/DL — HIGH (ref 70–99)
GLUCOSE BLDC GLUCOMTR-MCNC: 157 MG/DL — HIGH (ref 70–99)
GLUCOSE BLDC GLUCOMTR-MCNC: 179 MG/DL — HIGH (ref 70–99)
GLUCOSE BLDC GLUCOMTR-MCNC: 196 MG/DL — HIGH (ref 70–99)
GLUCOSE BLDC GLUCOMTR-MCNC: 220 MG/DL — HIGH (ref 70–99)
GLUCOSE SERPL-MCNC: 116 MG/DL — HIGH (ref 70–99)
HCT VFR BLD CALC: 31.4 % — LOW (ref 39–50)
HGB BLD-MCNC: 9.7 G/DL — LOW (ref 13–17)
IRON SATN MFR SERPL: 18 % — SIGNIFICANT CHANGE UP (ref 16–55)
IRON SATN MFR SERPL: 38 UG/DL — LOW (ref 45–165)
MAGNESIUM SERPL-MCNC: 1.8 MG/DL — SIGNIFICANT CHANGE UP (ref 1.6–2.6)
MCHC RBC-ENTMCNC: 30.1 PG — SIGNIFICANT CHANGE UP (ref 27–34)
MCHC RBC-ENTMCNC: 30.9 G/DL — LOW (ref 32–36)
MCV RBC AUTO: 97.5 FL — SIGNIFICANT CHANGE UP (ref 80–100)
NRBC # BLD: 0 /100 WBCS — SIGNIFICANT CHANGE UP (ref 0–0)
PHOSPHATE SERPL-MCNC: 2.8 MG/DL — SIGNIFICANT CHANGE UP (ref 2.5–4.5)
PLATELET # BLD AUTO: 245 K/UL — SIGNIFICANT CHANGE UP (ref 150–400)
POTASSIUM SERPL-MCNC: 4.3 MMOL/L — SIGNIFICANT CHANGE UP (ref 3.5–5.3)
POTASSIUM SERPL-SCNC: 4.3 MMOL/L — SIGNIFICANT CHANGE UP (ref 3.5–5.3)
PROT SERPL-MCNC: 6.1 GM/DL — SIGNIFICANT CHANGE UP (ref 6–8.3)
RBC # BLD: 3.22 M/UL — LOW (ref 4.2–5.8)
RBC # FLD: 14.3 % — SIGNIFICANT CHANGE UP (ref 10.3–14.5)
SODIUM SERPL-SCNC: 139 MMOL/L — SIGNIFICANT CHANGE UP (ref 135–145)
TIBC SERPL-MCNC: 209 UG/DL — LOW (ref 220–430)
UIBC SERPL-MCNC: 172 UG/DL — SIGNIFICANT CHANGE UP (ref 110–370)
VIT B12 SERPL-MCNC: 647 PG/ML — SIGNIFICANT CHANGE UP (ref 232–1245)
WBC # BLD: 7.44 K/UL — SIGNIFICANT CHANGE UP (ref 3.8–10.5)
WBC # FLD AUTO: 7.44 K/UL — SIGNIFICANT CHANGE UP (ref 3.8–10.5)

## 2022-12-13 PROCEDURE — 99232 SBSQ HOSP IP/OBS MODERATE 35: CPT

## 2022-12-13 PROCEDURE — 99223 1ST HOSP IP/OBS HIGH 75: CPT

## 2022-12-13 PROCEDURE — 99233 SBSQ HOSP IP/OBS HIGH 50: CPT

## 2022-12-13 PROCEDURE — 99222 1ST HOSP IP/OBS MODERATE 55: CPT

## 2022-12-13 RX ORDER — ATORVASTATIN CALCIUM 80 MG/1
20 TABLET, FILM COATED ORAL AT BEDTIME
Refills: 0 | Status: DISCONTINUED | OUTPATIENT
Start: 2022-12-13 | End: 2023-02-08

## 2022-12-13 RX ORDER — CEFAZOLIN SODIUM 1 G
1000 VIAL (EA) INJECTION EVERY 8 HOURS
Refills: 0 | Status: DISCONTINUED | OUTPATIENT
Start: 2022-12-13 | End: 2022-12-15

## 2022-12-13 RX ORDER — PREGABALIN 225 MG/1
1000 CAPSULE ORAL DAILY
Refills: 0 | Status: DISCONTINUED | OUTPATIENT
Start: 2022-12-13 | End: 2023-03-18

## 2022-12-13 RX ORDER — METOPROLOL TARTRATE 50 MG
25 TABLET ORAL
Refills: 0 | Status: DISCONTINUED | OUTPATIENT
Start: 2022-12-13 | End: 2023-02-08

## 2022-12-13 RX ORDER — BACITRACIN ZINC 500 UNIT/G
1 OINTMENT IN PACKET (EA) TOPICAL
Refills: 0 | Status: DISCONTINUED | OUTPATIENT
Start: 2022-12-13 | End: 2023-03-18

## 2022-12-13 RX ORDER — FOLIC ACID 0.8 MG
1 TABLET ORAL DAILY
Refills: 0 | Status: DISCONTINUED | OUTPATIENT
Start: 2022-12-13 | End: 2023-03-11

## 2022-12-13 RX ADMIN — SENNA PLUS 2 TABLET(S): 8.6 TABLET ORAL at 21:35

## 2022-12-13 RX ADMIN — Medication 500 MILLIGRAM(S): at 05:33

## 2022-12-13 RX ADMIN — Medication 1 APPLICATION(S): at 19:59

## 2022-12-13 RX ADMIN — Medication 1 APPLICATION(S): at 05:32

## 2022-12-13 RX ADMIN — CHLORHEXIDINE GLUCONATE 15 MILLILITER(S): 213 SOLUTION TOPICAL at 19:58

## 2022-12-13 RX ADMIN — HEPARIN SODIUM 1100 UNIT(S)/HR: 5000 INJECTION INTRAVENOUS; SUBCUTANEOUS at 05:23

## 2022-12-13 RX ADMIN — Medication 500 MILLIGRAM(S): at 21:35

## 2022-12-13 RX ADMIN — Medication 100 MILLIGRAM(S): at 15:20

## 2022-12-13 RX ADMIN — Medication 500 MILLIGRAM(S): at 15:12

## 2022-12-13 RX ADMIN — Medication 500 MILLIGRAM(S): at 05:34

## 2022-12-13 RX ADMIN — Medication 2: at 19:59

## 2022-12-13 RX ADMIN — Medication 2 PACKET(S): at 05:34

## 2022-12-13 RX ADMIN — CHLORHEXIDINE GLUCONATE 15 MILLILITER(S): 213 SOLUTION TOPICAL at 05:34

## 2022-12-13 RX ADMIN — HEPARIN SODIUM 1300 UNIT(S)/HR: 5000 INJECTION INTRAVENOUS; SUBCUTANEOUS at 19:50

## 2022-12-13 RX ADMIN — Medication 2: at 12:02

## 2022-12-13 RX ADMIN — HEPARIN SODIUM 1100 UNIT(S)/HR: 5000 INJECTION INTRAVENOUS; SUBCUTANEOUS at 07:19

## 2022-12-13 RX ADMIN — Medication 1 APPLICATION(S): at 05:35

## 2022-12-13 RX ADMIN — CHLORHEXIDINE GLUCONATE 1 APPLICATION(S): 213 SOLUTION TOPICAL at 06:28

## 2022-12-13 RX ADMIN — PREGABALIN 1000 MICROGRAM(S): 225 CAPSULE ORAL at 21:36

## 2022-12-13 RX ADMIN — LEVETIRACETAM 1500 MILLIGRAM(S): 250 TABLET, FILM COATED ORAL at 19:58

## 2022-12-13 RX ADMIN — LEVETIRACETAM 1500 MILLIGRAM(S): 250 TABLET, FILM COATED ORAL at 05:33

## 2022-12-13 RX ADMIN — Medication 25 MILLIGRAM(S): at 20:03

## 2022-12-13 RX ADMIN — HEPARIN SODIUM 1300 UNIT(S)/HR: 5000 INJECTION INTRAVENOUS; SUBCUTANEOUS at 04:52

## 2022-12-13 RX ADMIN — Medication 1 APPLICATION(S): at 16:43

## 2022-12-13 RX ADMIN — Medication 100 MILLIGRAM(S): at 21:36

## 2022-12-13 RX ADMIN — Medication 650 MILLIGRAM(S): at 23:47

## 2022-12-13 RX ADMIN — Medication 81 MILLIGRAM(S): at 12:06

## 2022-12-13 RX ADMIN — INSULIN GLARGINE 20 UNIT(S): 100 INJECTION, SOLUTION SUBCUTANEOUS at 08:53

## 2022-12-13 RX ADMIN — HEPARIN SODIUM 1100 UNIT(S)/HR: 5000 INJECTION INTRAVENOUS; SUBCUTANEOUS at 12:03

## 2022-12-13 RX ADMIN — Medication 1 APPLICATION(S): at 16:40

## 2022-12-13 RX ADMIN — Medication 4: at 23:48

## 2022-12-13 RX ADMIN — HEPARIN SODIUM 1300 UNIT(S)/HR: 5000 INJECTION INTRAVENOUS; SUBCUTANEOUS at 19:54

## 2022-12-13 RX ADMIN — HEPARIN SODIUM 3500 UNIT(S): 5000 INJECTION INTRAVENOUS; SUBCUTANEOUS at 19:57

## 2022-12-13 RX ADMIN — ATORVASTATIN CALCIUM 20 MILLIGRAM(S): 80 TABLET, FILM COATED ORAL at 21:35

## 2022-12-13 NOTE — PROGRESS NOTE ADULT - SUBJECTIVE AND OBJECTIVE BOX
BRANDON CAMARILLO    [facility] [address] [address]    Allergies    No Known Allergies    Intolerances        PAST MEDICAL & SURGICAL HISTORY:  HTN (hypertension)      HLD (hyperlipidemia)      Diabetes mellitus      Lacunar infarction      BPH (benign prostatic hyperplasia)      CHF (congestive heart failure)      Chronic obstructive pulmonary disease, unspecified COPD type      S/P CABG (coronary artery bypass graft)  2014          FAMILY HISTORY:      Home Medications:  aspirin 81 mg oral delayed release tablet: 1 tab(s) orally once a day (12 Jun 2020 17:35)  Liquifilm Tears preserved ophthalmic solution: 1 drop(s) to each affected eye 2 times a day (12 Jun 2020 17:35)      MEDICATIONS  (STANDING):  aspirin  chewable 81 milliGRAM(s) Oral daily  bacitracin   Ointment 1 Application(s) Topical two times a day  cephalexin 500 milliGRAM(s) Oral four times a day  chlorhexidine 0.12% Liquid 15 milliLiter(s) Oral Mucosa every 12 hours  chlorhexidine 2% Cloths 1 Application(s) Topical <User Schedule>  collagenase Ointment 1 Application(s) Topical two times a day  heparin  Infusion.  Unit(s)/Hr (16 mL/Hr) IV Continuous <Continuous>  insulin glargine Injectable (LANTUS) 20 Unit(s) SubCutaneous every morning  insulin lispro (ADMELOG) corrective regimen sliding scale   SubCutaneous every 6 hours  levETIRAcetam 1500 milliGRAM(s) Oral two times a day  polyethylene glycol 3350 17 Gram(s) Oral daily  senna 2 Tablet(s) Oral at bedtime  silver sulfADIAZINE 1% Cream 1 Application(s) Topical two times a day  valproic  acid Syrup 500 milliGRAM(s) Enteral Tube every 8 hours    MEDICATIONS  (PRN):  acetaminophen     Tablet .. 650 milliGRAM(s) Oral every 6 hours PRN Temp greater or equal to 38C (100.4F), Mild Pain (1 - 3)  aluminum hydroxide/magnesium hydroxide/simethicone Suspension 30 milliLiter(s) Oral every 4 hours PRN Dyspepsia  heparin   Injectable 7000 Unit(s) IV Push every 6 hours PRN For aPTT less than 40  heparin   Injectable 3500 Unit(s) IV Push every 6 hours PRN For aPTT between 40 - 57  traMADol 25 milliGRAM(s) Oral every 8 hours PRN Severe Pain (7 - 10)      Diet, NPO with Tube Feed:   Tube Feeding Modality: Gastrostomy  Vital AF 1.2  Total Volume for 24 Hours (mL): 1200  Continuous  Starting Tube Feed Rate mL per Hour: 45  Increase Tube Feed Rate by (mL): 5     Every 12 hours  Until Goal Tube Feed Rate (mL per Hour): 50  Tube Feed Duration (in Hours): 24  Tube Feed Start Time: 15:00 (12-08-22 @ 14:46) [Active]          Vital Signs Last 24 Hrs  T(C): 37.6 (13 Dec 2022 04:26), Max: 37.7 (12 Dec 2022 15:58)  T(F): 99.6 (13 Dec 2022 04:26), Max: 99.8 (12 Dec 2022 15:58)  HR: 72 (13 Dec 2022 05:24) (67 - 94)  BP: 164/95 (13 Dec 2022 04:26) (111/58 - 164/95)  BP(mean): --  RR: 20 (13 Dec 2022 04:26) (19 - 20)  SpO2: 99% (13 Dec 2022 05:24) (98% - 100%)    Parameters below as of 13 Dec 2022 04:26  Patient On (Oxygen Delivery Method): ventilator          12-12-22 @ 07:01  -  12-13-22 @ 07:00  --------------------------------------------------------  IN: 2090 mL / OUT: 1 mL / NET: 2089 mL        Mode: AC/ CMV (Assist Control/ Continuous Mandatory Ventilation), RR (machine): 14, TV (machine): 450, FiO2: 30, PEEP: 5, ITime: 0.9, MAP: 13, PIP: 29      LABS:                        9.7    7.44  )-----------( 245      ( 13 Dec 2022 04:02 )             31.4     12-13    139  |  100  |  20  ----------------------------<  116<H>  4.3   |  35<H>  |  0.62    Ca    8.6      13 Dec 2022 04:02  Phos  2.8     12-13  Mg     1.8     12-13    TPro  6.1  /  Alb  1.8<L>  /  TBili  0.4  /  DBili  x   /  AST  16  /  ALT  17  /  AlkPhos  96  12-13    PT/INR - ( 12 Dec 2022 08:25 )   PT: 12.7 sec;   INR: 1.07 ratio         PTT - ( 13 Dec 2022 04:02 )  PTT:124.2 sec          WBC:  WBC Count: 7.44 K/uL (12-13 @ 04:02)  WBC Count: 7.96 K/uL (12-12 @ 17:45)  WBC Count: 7.96 K/uL (12-12 @ 06:35)  WBC Count: 9.12 K/uL (12-10 @ 06:38)  WBC Count: 9.46 K/uL (12-09 @ 16:30)      MICROBIOLOGY:  RECENT CULTURES:              PT/INR - ( 12 Dec 2022 08:25 )   PT: 12.7 sec;   INR: 1.07 ratio         PTT - ( 13 Dec 2022 04:02 )  PTT:124.2 sec    Sodium:  Sodium, Serum: 139 mmol/L (12-13 @ 04:02)  Sodium, Serum: 137 mmol/L (12-12 @ 06:35)  Sodium, Serum: 139 mmol/L (12-10 @ 06:38)  Sodium, Serum: 140 mmol/L (12-09 @ 12:15)      0.62 mg/dL 12-13 @ 04:02  0.59 mg/dL 12-12 @ 06:35  0.60 mg/dL 12-10 @ 06:38  0.60 mg/dL 12-09 @ 12:15      Hemoglobin:  Hemoglobin: 9.7 g/dL (12-13 @ 04:02)  Hemoglobin: 9.5 g/dL (12-12 @ 17:45)  Hemoglobin: 9.9 g/dL (12-12 @ 06:35)  Hemoglobin: 9.6 g/dL (12-10 @ 06:38)  Hemoglobin: 9.4 g/dL (12-09 @ 16:30)      Platelets: Platelet Count - Automated: 245 K/uL (12-13 @ 04:02)  Platelet Count - Automated: 248 K/uL (12-12 @ 17:45)  Platelet Count - Automated: 256 K/uL (12-12 @ 06:35)  Platelet Count - Automated: 269 K/uL (12-10 @ 06:38)  Platelet Count - Automated: 168 K/uL (12-09 @ 16:30)      LIVER FUNCTIONS - ( 13 Dec 2022 04:02 )  Alb: 1.8 g/dL / Pro: 6.1 gm/dL / ALK PHOS: 96 U/L / ALT: 17 U/L / AST: 16 U/L / GGT: x                 RADIOLOGY & ADDITIONAL STUDIES:      MICROBIOLOGY:  RECENT CULTURES:

## 2022-12-13 NOTE — CONSULT NOTE ADULT - SUBJECTIVE AND OBJECTIVE BOX
CARDIOLOGY CONSULT NOTE    Patient is a 72y Male with a known history of :  Open wound of left hand [S61.402A]      HPI:  71 yo M with h/o COPD, CAD s/p CABG 2014 and PCI 2015, chronic systolic/diastolic heart failure (most recent EF 40-45% - Takotsubos  CM), 2nd degree AV block s/p Medtronic PPM, HTN, DM, CKD 3, and CVA (lacunar infarct); BIBEMS after son returned home from work to find pt unresponsive with noted blood in mouth and sonorous breathing. Unknown how long pt unresponsive for at home. Blood sugar in the 40s with EMS s/p glucagon. On arrival to ER pt hypothermic and agonally breathing. Pt became unresponsive with loss of pulse; ACLS initiated. PEA arrest with ROSC after approximately 5 min s/p Epi x2.  Pt intubated during CODE BLUE. Per son pt on the day prior to presentation reported low blood sugar reads on his glucometer in the range of 80s. Son believes pt continued to take his insulin and oral diabetic regimen. Son states that pt was eating but may have been eating less in the last few days. Later on pt noted to have tonic-clonic Sz.  Long hospital course of ~1 month.    PEA arrest  Acute hypoxemic respiratory failure now chronic:  s/p intubation, failed extubation, now trach to vent  LUE VTE with cellulitis /infected left hand hematoma s/p bedside debridement 12/13   New onset Seizure likely due to anoxic brain injury  Unstageable sacral pressure wound     Currently stable and awaiting placement.        REVIEW OF SYSTEMS:  pt awake/alert on vent; unable to answer questions.  Nods head and moves extremities... denied any cardiac sx.    MEDICATIONS  (STANDING):  aspirin  chewable 81 milliGRAM(s) Oral daily  bacitracin   Ointment 1 Application(s) Topical two times a day  ceFAZolin   IVPB 1000 milliGRAM(s) IV Intermittent every 8 hours  chlorhexidine 0.12% Liquid 15 milliLiter(s) Oral Mucosa every 12 hours  chlorhexidine 2% Cloths 1 Application(s) Topical <User Schedule>  collagenase Ointment 1 Application(s) Topical two times a day  cyanocobalamin 1000 MICROGram(s) Oral daily  folic acid 1 milliGRAM(s) Oral daily  heparin  Infusion.  Unit(s)/Hr (16 mL/Hr) IV Continuous <Continuous>  insulin glargine Injectable (LANTUS) 20 Unit(s) SubCutaneous every morning  insulin lispro (ADMELOG) corrective regimen sliding scale   SubCutaneous every 6 hours  levETIRAcetam 1500 milliGRAM(s) Oral two times a day  polyethylene glycol 3350 17 Gram(s) Oral daily  senna 2 Tablet(s) Oral at bedtime  silver sulfADIAZINE 1% Cream 1 Application(s) Topical two times a day  valproic  acid Syrup 500 milliGRAM(s) Enteral Tube every 8 hours    MEDICATIONS  (PRN):  acetaminophen     Tablet .. 650 milliGRAM(s) Oral every 6 hours PRN Temp greater or equal to 38C (100.4F), Mild Pain (1 - 3)  aluminum hydroxide/magnesium hydroxide/simethicone Suspension 30 milliLiter(s) Oral every 4 hours PRN Dyspepsia  heparin   Injectable 7000 Unit(s) IV Push every 6 hours PRN For aPTT less than 40  heparin   Injectable 3500 Unit(s) IV Push every 6 hours PRN For aPTT between 40 - 57  traMADol 25 milliGRAM(s) Oral every 8 hours PRN Severe Pain (7 - 10)      ALLERGIES: No Known Allergies      FAMILY HISTORY:      PHYSICAL EXAMINATION:  -----------------------------  T(C): 37.4 (12-13-22 @ 10:49), Max: 37.6 (12-13-22 @ 04:26)  HR: 83 (12-13-22 @ 16:00) (67 - 90)  BP: 164/81 (12-13-22 @ 10:49) (152/67 - 164/95)  RR: 19 (12-13-22 @ 10:49) (19 - 20)  SpO2: 98% (12-13-22 @ 16:00) (98% - 100%)    Constitutional: vented; no acute distress.   Eyes: the conjunctiva exhibited no abnormalities and the eyelids demonstrated no xanthelasmas.   HEENT: normal oral mucosa, no oral pallor and no oral cyanosis.   Neck: normal jugular venous A waves present, normal jugular venous V waves present and no jugular venous page A waves.   Pulmonary: no respiratory distress, normal respiratory rhythm and effort, no accessory muscle use and lungs were clear to auscultation bilaterally.   Cardiovascular: heart rate and rhythm were normal, normal S1 and S2 and no murmur, gallop, rub, heave or thrill are present.   Abdomen: soft, non-tender, no hepato-splenomegaly and no abdominal mass palpated.   Musculoskeletal: the gait could not be assessed..   Extremities: L arm debrided and bandaged         LABS:   --------  12-13    139  |  100  |  20  ----------------------------<  116<H>  4.3   |  35<H>  |  0.62    Ca    8.6      13 Dec 2022 04:02  Phos  2.8     12-13  Mg     1.8     12-13    TPro  6.1  /  Alb  1.8<L>  /  TBili  0.4  /  DBili  x   /  AST  16  /  ALT  17  /  AlkPhos  96  12-13                           9.7    7.44  )-----------( 245      ( 13 Dec 2022 04:02 )             31.4     PT/INR - ( 12 Dec 2022 08:25 )   PT: 12.7 sec;   INR: 1.07 ratio         PTT - ( 13 Dec 2022 11:32 )  PTT:57.9 sec            < from: TTE Echo Complete w/o Contrast w/ Doppler (11.14.22 @ 09:22) >  Summary:   1. Moderately decreased segmental left ventricular systolic function.   2. Normal global left ventricular systolic function.   3. Apical lateral segment, apical anterior segment, and apex are   abnormal as described above.   4. Increased LV wall thickness.   5. Takotsubo cardiomyopathy.   6. There is mild concentric left ventricular hypertrophy.   7. Degenerative mitral valve.   8. Mild mitral annular calcification.   9. Mild mitral valve regurgitation.  10. Mild thickening and calcification of the anterior and posterior   mitral valve leaflets.  11. Estimated pulmonary artery systolic pressure is 42.3 mmHg assuming a   right atrial pressure of 5 mmHg, which is consistent with mild pulmonary   hypertension.      [id number] Marcell Smalls MD, [facility]  Electronically signed on 11/14/2022 at 3:58:57 PM    < end of copied text >

## 2022-12-13 NOTE — PROGRESS NOTE ADULT - SUBJECTIVE AND OBJECTIVE BOX
Vascular Surgery Progress Note     Subjective/24hour Events: Slightly improved swelling of left arm/hand.     Vital Signs:  Vital Signs Last 24 Hrs  T(C): 37.4 (13 Dec 2022 10:49), Max: 37.7 (12 Dec 2022 15:58)  T(F): 99.3 (13 Dec 2022 10:49), Max: 99.8 (12 Dec 2022 15:58)  HR: 84 (13 Dec 2022 10:49) (67 - 90)  BP: 164/81 (13 Dec 2022 10:49) (138/79 - 164/95)  BP(mean): --  RR: 19 (13 Dec 2022 10:49) (19 - 20)  SpO2: 100% (13 Dec 2022 10:49) (98% - 100%)    Parameters below as of 13 Dec 2022 04:26  Patient On (Oxygen Delivery Method): ventilator        CAPILLARY BLOOD GLUCOSE      POCT Blood Glucose.: 146 mg/dL (13 Dec 2022 08:27)  POCT Blood Glucose.: 126 mg/dL (13 Dec 2022 05:48)  POCT Blood Glucose.: 92 mg/dL (12 Dec 2022 23:23)  POCT Blood Glucose.: 140 mg/dL (12 Dec 2022 17:22)      I&O's Detail    12 Dec 2022 07:01  -  13 Dec 2022 07:00  --------------------------------------------------------  IN:    Enteral Tube Flush: 700 mL    Heparin Infusion: 290 mL    Vital1.0: 1100 mL  Total IN: 2090 mL    OUT:    Stool (mL): 1 mL  Total OUT: 1 mL    Total NET: 2089 mL          MEDICATIONS  (STANDING):  aspirin  chewable 81 milliGRAM(s) Oral daily  bacitracin   Ointment 1 Application(s) Topical two times a day  cephalexin 500 milliGRAM(s) Oral four times a day  chlorhexidine 0.12% Liquid 15 milliLiter(s) Oral Mucosa every 12 hours  chlorhexidine 2% Cloths 1 Application(s) Topical <User Schedule>  collagenase Ointment 1 Application(s) Topical two times a day  heparin  Infusion.  Unit(s)/Hr (16 mL/Hr) IV Continuous <Continuous>  insulin glargine Injectable (LANTUS) 20 Unit(s) SubCutaneous every morning  insulin lispro (ADMELOG) corrective regimen sliding scale   SubCutaneous every 6 hours  levETIRAcetam 1500 milliGRAM(s) Oral two times a day  polyethylene glycol 3350 17 Gram(s) Oral daily  senna 2 Tablet(s) Oral at bedtime  silver sulfADIAZINE 1% Cream 1 Application(s) Topical two times a day  valproic  acid Syrup 500 milliGRAM(s) Enteral Tube every 8 hours    MEDICATIONS  (PRN):  acetaminophen     Tablet .. 650 milliGRAM(s) Oral every 6 hours PRN Temp greater or equal to 38C (100.4F), Mild Pain (1 - 3)  aluminum hydroxide/magnesium hydroxide/simethicone Suspension 30 milliLiter(s) Oral every 4 hours PRN Dyspepsia  heparin   Injectable 7000 Unit(s) IV Push every 6 hours PRN For aPTT less than 40  heparin   Injectable 3500 Unit(s) IV Push every 6 hours PRN For aPTT between 40 - 57  traMADol 25 milliGRAM(s) Oral every 8 hours PRN Severe Pain (7 - 10)      Physical Exam:  Gen: NAD.  HEENT: trach.  LUE: swelling mostly from distal upper arm down to hand. On dorsum of hand, there is a hematoma with overlying necrotic skin.     Labs:    12-13    139  |  100  |  20  ----------------------------<  116<H>  4.3   |  35<H>  |  0.62    Ca    8.6      13 Dec 2022 04:02  Phos  2.8     12-13  Mg     1.8     12-13    TPro  6.1  /  Alb  1.8<L>  /  TBili  0.4  /  DBili  x   /  AST  16  /  ALT  17  /  AlkPhos  96  12-13    LIVER FUNCTIONS - ( 13 Dec 2022 04:02 )  Alb: 1.8 g/dL / Pro: 6.1 gm/dL / ALK PHOS: 96 U/L / ALT: 17 U/L / AST: 16 U/L / GGT: x                                 9.7    7.44  )-----------( 245      ( 13 Dec 2022 04:02 )             31.4     PT/INR - ( 12 Dec 2022 08:25 )   PT: 12.7 sec;   INR: 1.07 ratio         PTT - ( 13 Dec 2022 04:02 )  PTT:124.2 sec      < from: US Duplex Arterial Upper Ext Ltd, Left (12.11.22 @ 17:30) >  IMPRESSION:  *  No significant stenosis or occlusion in the left arm.    < end of copied text >  < from: US Duplex Venous Upper Ext Ltd, Left (12.11.22 @ 17:32) >  IMPRESSION:  There is occlusive thrombus in the left mid subclavian vein with partial   slow flow detected in the lateral subclavian vein.    < end of copied text >

## 2022-12-13 NOTE — PROGRESS NOTE ADULT - ASSESSMENT
REVIEW OF SYMPTOMS      Able to give (reliable) ROS  NO     PHYSICAL EXAM    HEENT Unremarkable  atraumatic   RESP Fair air entry EXP prolonged    Harsh breath sound Resp distres mild   CARDIAC S1 S2 No S3     NO JVD    ABDOMEN SOFT BS PRESENT NOT DISTENDED No hepatosplenomegaly   PEDAL EDEMA present No calf tenderness  NO rash       GENERAL DATA .   GOC.  12/11/2022 full code       ALLGY.  nka                            WT.   ..  12/2/2022 86  BMI.   ..    12/2/2022 29                          ICU STAY.   .. 11/29-12/9  COVID.   .. 12/2/2022 scv2 (-)   ..  11/20/2022 scv2 (-)   BEST PRACTICE ISSUES.    HOB ELEVATN. Yes  DVT PPLX.   12/12/2022 iv ufh Dr RANDALL ( l sc thrombus)    ALEJO PPLX.   ..      INFN PPLX.   ..  11/25 chlorhex .12%   .. 11/14 chlorhex 2%   SP SW ANTWAN.         DIET.    ..  12/8 vital 1.2 1200 gt   IV fl.  ..            PROCEDURES.  .. 12/5/2022 trach  .. 12/5/2022 peg       ABGS.  12/3/2022 ac 14/450/.3/5 750/46/75      VS/ PO/IO/ VENT/ DRIPS.   12/13/2022 99f 80 130/80   12/13/2022 12p cpap 5/10/.3 svt 357 r 28 rsbi 78    PATIENT PRESENTATION.  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 Unknown how long pt unresponsive for at home. Blood sugar in the 40s with EMS s/p glucagon. On arrival to ER pt hypothermic and agonally breathing. Pt became unresponsive with loss of pulse; ACLS initiated. PEA arrest with ROSC after approximately 5 min s/p Epi x2.  Pt intubated during CODE BLUE. Per son pt on the day prior to presentation reported low blood sugar reads on his glucometer in the range of 80s. Son believes pt continued to take his insulin and oral diabetic regimen. Son states that pt was eating but may have been eating less in the last few days. Later on pt noted to have tonic-clonic Sz. ICU dx: 1) Hypoglycemia 2) PEA arrest 3) Takotsubo cardiomyopathy found on Echo 4) New onset Sz either 2 to hypoglycemia vs anoxic injury 5) ELMER 2 to ATN 6) Shock liver. Pt with improvement in MS. s/p trach/PEG on 12/5  Pt downgraded to floor  Pulm consulted 12/11/2022         PROBLEMS.  Sp  cac 11/14/2022   .. 11/14/2022 cac preceded by hypoglycemia followed by anoxic encephalopathy   vent management.   VTE.  .. 12/4 v duplx (-)  .. 11/14 cta ch no pe ac fx lat r rib 4-6 sub cm l lo lobe nodules   .. 12/12/2022 v duplx lue  occlusive thrombosis l mid subclav  partial slow flow in lat subclav   .. 12/12/2022 iv ufh  Trach 12/5   Infection.  .. w 12/10 w 9.1   .. cxr 12/6 trach resolutn of plate atelectasis l base   .. mrsa 11/26 (-)   Cellulitis l hand .  .. 12/12/2022 cephalexin 500.4 x 5d   CAD.  .. PMH CABG 2014 Stent 2015   .. 11/14/2022 ekg paced qtc 568   .. 11/14 asa 81   CHF.   .. 11/14/2022 echo mod decr segmental lvsf apical lateral apiccal ant segment are abn takotsubo cmpthy pasp 42 .  anemia.  .. Hb 12/10-12/11 Hb 9.6 - 9.5   CONSTIPN.  .. 11/21 miralax   .. 11/21 senna   .. 11/20 keppra   DM.  .. 11/21 insulin  DECUB.  .. 12/2 collagenase   SEIZURES.  .. 11/23 valproic 500.3        ASSESSMENT RECOMMENDATIONS..  Sp  cac 11/14/2022   .. Patient is interactive and answers questions appropriately as dw son on 12/13/2022   VTE.  .. 12/12/2022 Pt found to have l subclav dvt    12/12/2022 iv ufh startd   Trach 12/5   .. trach care   vent management.  .. vent bundle dsbt dsv target pplat 30 (-) PO2 60 (+) pH 730 (+)   weaming.  12/13/2022 tolerated cpap 12/13/20 rsbi 78   Infection.  ..  Monitor for vap    cellulitis hand   .. 12/12/2022 started on 5 d course cephalexin for cellulitis   CAD.  .. Monitor   CHF.   .. 11/14/2022 echo mod decr segmental lvsf apical lateral apiccal ant segment are abn takotsubo cmpthy pasp 42 .  .. monitor for chf   anemia.  .. Monitor  Target Hb 7 (+)   DECUB.  .. 12/2 collagenase   SEIZURES.  .. sz meds as guided by neuro   NONVIOLENT NONSELF DESTRUCTIVE LEVEL 1   .. 12/13/2022       TIME SPENT   Over 25 minutes aggregate care time spent on encounter; activities included   direct patient care, counseling and/or coordinating care reviewing notes, lab data/ imaging , discussion with multidisciplinary team/ patient  /family and explaining in detail risks, benefits, alternatives  of the recommendations     MARQUISE TAYLOR 72 m 11/14/2022 1948 Dr Leela Dowling

## 2022-12-13 NOTE — PROGRESS NOTE ADULT - ASSESSMENT
Patient with left arm swelling, left subclavian vein DVT.   Also with left hand wound.    Plastics planning for left hand debridement today.  Continue ac for treatment of DVT.   Recommend left arm compression and elevation.

## 2022-12-13 NOTE — CONSULT NOTE ADULT - SUBJECTIVE AND OBJECTIVE BOX
See dictated note.  Written informed consent obtained and time out done.  Tolerated debridement and I&D of left hand, culture sent.  Rec: ID consult, iv abx/local wound care.  No further plastic surgical intervention at this point.  F/u as outpt.

## 2022-12-13 NOTE — PROGRESS NOTE ADULT - SUBJECTIVE AND OBJECTIVE BOX
Patient seen and examined bedside   no overnight events   patient communicated with his hands d/t being trached     ROS: unable to examine due to [ ] Encephalopathy  [ ] Advanced Dementia  [ ] Expressive Aphasia  [x ] TRACH/VenT   patient able to communicate with hands and face, like nodding /shaking his head and pointing   per son, he is responding appropriately   Pt  does speak English         Objective:  Vital Signs Last 24 Hrs  T(C): 37.4 (13 Dec 2022 10:49), Max: 37.7 (12 Dec 2022 15:58)  T(F): 99.3 (13 Dec 2022 10:49), Max: 99.8 (12 Dec 2022 15:58)  HR: 84 (13 Dec 2022 10:49) (67 - 90)  BP: 164/81 (13 Dec 2022 10:49) (138/79 - 164/95)  BP(mean): --  RR: 19 (13 Dec 2022 10:49) (19 - 20)  SpO2: 100% (13 Dec 2022 10:49) (98% - 100%)    Parameters below as of 13 Dec 2022 04:26  Patient On (Oxygen Delivery Method): ventilator          Physical Exam:  GENERAL: NAD,  no increased WOB  HEAD:  Atraumatic, Normocephalic  EYES: EOMI, PERRLA, conjunctiva and sclera clear  ENMT: No tonsillar erythema, exudates, or enlargement; Moist mucous membranes   NECK: Supple, No JVD, +TRACH  NERVOUS SYSTEM:  awake and alert, slightly agitated at times , moving extremities spontaneously   CHEST/LUNG: CTAB;  No rales, rhonchi, wheezing, or rubs  HEART: Regular rate and rhythm; No murmurs, rubs, or gallops  ABDOMEN: Soft, Nontender, Nondistended; Bowel sounds present; +PEG   EXTREMITIES:   left hand with 2cm x3cm area of skin breakdown and surrounding erythema no drainage appreciated, hands and digits warm to palpation   +anasarca ;   LUE  edema +++  with bedside doppler good radial pulse of LUE         Labs:                                     9.7    7.44  )-----------( 245      ( 13 Dec 2022 04:02 )             31.4   12-13    139  |  100  |  20  ----------------------------<  116<H>  4.3   |  35<H>  |  0.62    Ca    8.6      13 Dec 2022 04:02  Phos  2.8     12-13  Mg     1.8     12-13    TPro  6.1  /  Alb  1.8<L>  /  TBili  0.4  /  DBili  x   /  AST  16  /  ALT  17  /  AlkPhos  96  12-13      Active Medications  MEDICATIONS  (STANDING):  aspirin  chewable 81 milliGRAM(s) Oral daily  chlorhexidine 0.12% Liquid 15 milliLiter(s) Oral Mucosa every 12 hours  chlorhexidine 2% Cloths 1 Application(s) Topical <User Schedule>  collagenase Ointment 1 Application(s) Topical two times a day  heparin   Injectable 5000 Unit(s) SubCutaneous every 8 hours  insulin glargine Injectable (LANTUS) 20 Unit(s) SubCutaneous every morning  insulin lispro (ADMELOG) corrective regimen sliding scale   SubCutaneous every 6 hours  levETIRAcetam  IVPB 1000 milliGRAM(s) IV Intermittent <User Schedule>  polyethylene glycol 3350 17 Gram(s) Oral daily  senna 2 Tablet(s) Oral at bedtime  valproic  acid Syrup 500 milliGRAM(s) Enteral Tube every 8 hours    MEDICATIONS  (PRN):  acetaminophen     Tablet .. 650 milliGRAM(s) Oral every 6 hours PRN Temp greater or equal to 38C (100.4F), Mild Pain (1 - 3)  fentaNYL    Injectable 50 MICROGram(s) IV Push every 4 hours PRN Moderate Pain (4 - 6)    Consultant(s) Notes Reviewed [x ] Yes     Care Discussed with [ x] Consultants  [x ] Patient  [ x] Family--son at bedside    [x ] /   [x ] Other; RN

## 2022-12-13 NOTE — PROGRESS NOTE ADULT - ASSESSMENT
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 Unknown how long pt unresponsive for at home. Blood sugar in the 40s with EMS s/p glucagon. On arrival to ER pt hypothermic and agonally breathing. Pt became unresponsive with loss of pulse; ACLS initiated. PEA arrest with ROSC after approximately 5 min s/p Epi x2.  Pt intubated during CODE BLUE. Per son pt on the day prior to presentation reported low blood sugar reads on his glucometer in the range of 80s. Son believes pt continued to take his insulin and oral diabetic regimen. Son states that pt was eating but may have been eating less in the last few days. Later on pt noted to have tonic-clonic Sz.     PEA arrest  s/p Epi x 2. ROSC achieved  Takotsubo cardiomyopathy found on Echo   stable  12/13--will ask Cardiology to evaluate for further planning, need for repeat ECHO ?     Acute hypoxemic respiratory failure now chronic  s/p intubation , failed extubation, now trach to vent  c/w vent managment  pulm consulted  12/12 trach sutures removed by surgery     LUE VTE with cellulitis /infected left hand hematoma s/p bedside debridement 12/13   LUE edema and wound of left hand ; good radial pulse   --LUE venous duplex : occlusive thrombus in the left mid subclavian vein with partial   slow flow detected in the lateral subclavian vein.  --LUE arterial Duplex neg   --started heparin drip,  --vascular following   --12/13 Hand surgery consult appreciated, s/p bedside debridement. Dr. Zuñiga would like patient to be on Ancef (d/c keflex)   --ID consulted   --follow cultures     New onset Seizure likely due to anoxic brain injury  discussed with Dr. Loo, patient will be only on Keppra 1500mg bid   off vimpat and Depakote   monitor     ELMER 2 to ATN; resolved    Shock liver.  due to PEA arrest. resolved    DM2   A1c 8.8%  continue with current management   FS goal 140-180   continue with PEG feeds     Hypophosphatemia --repleted     Normocytic anemia   no e/o bleeding or bruising   H/H stable   follow-up anemia labs       Preventative measures   heparin gtt --dvt ppx  fall, aspiration  precautions   HOBE        Pt is a 71 yo M with h/o COPD, CAD s/p PCI with stent 2015 and CABG 2014, chronic diastolic heart failure (EF 50%), 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 Unknown how long pt unresponsive for at home. Blood sugar in the 40s with EMS s/p glucagon. On arrival to ER pt hypothermic and agonally breathing. Pt became unresponsive with loss of pulse; ACLS initiated. PEA arrest with ROSC after approximately 5 min s/p Epi x2.  Pt intubated during CODE BLUE. Per son pt on the day prior to presentation reported low blood sugar reads on his glucometer in the range of 80s. Son believes pt continued to take his insulin and oral diabetic regimen. Son states that pt was eating but may have been eating less in the last few days. Later on pt noted to have tonic-clonic Sz.     PEA arrest  s/p Epi x 2. ROSC achieved  Takotsubo cardiomyopathy found on Echo   stable  12/13--will ask Cardiology to evaluate for further planning, need for repeat ECHO ?     Acute hypoxemic respiratory failure now chronic  s/p intubation , failed extubation, now trach to vent  c/w vent managment  pulm consulted  daily SBT   12/12 trach sutures removed by surgery     LUE VTE with cellulitis /infected left hand hematoma s/p bedside debridement 12/13   LUE edema and wound of left hand ; good radial pulse   --LUE venous duplex : occlusive thrombus in the left mid subclavian vein with partial   slow flow detected in the lateral subclavian vein.  --LUE arterial Duplex neg   --started heparin drip,  --vascular following   --12/13 Hand surgery consult appreciated, s/p bedside debridement. Dr. Zuñiga would like patient to be on Ancef (d/c keflex)   --ID consulted   --follow cultures     New onset Seizure likely due to anoxic brain injury  discussed with Dr. Loo, patient will be only on Keppra 1500mg bid   off vimpat and Depakote   monitor     ELMER 2 to ATN; resolved    Shock liver.  due to PEA arrest. resolved    DM2   A1c 8.8%  continue with current management   FS goal 140-180   continue with PEG feeds     Hypophosphatemia --repleted     Normocytic anemia   no e/o bleeding or bruising   H/H stable   follow-up anemia labs       Preventative measures   heparin gtt --dvt ppx  fall, aspiration  precautions   HOBE        Pt is a 73 yo M with h/o COPD, CAD s/p PCI with stent 2015 and CABG 2014, chronic diastolic heart failure (EF 50%), 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 Unknown how long pt unresponsive for at home. Blood sugar in the 40s with EMS s/p glucagon. On arrival to ER pt hypothermic and agonally breathing. Pt became unresponsive with loss of pulse; ACLS initiated. PEA arrest with ROSC after approximately 5 min s/p Epi x2.  Pt intubated during CODE BLUE. Per son pt on the day prior to presentation reported low blood sugar reads on his glucometer in the range of 80s. Son believes pt continued to take his insulin and oral diabetic regimen. Son states that pt was eating but may have been eating less in the last few days. Later on pt noted to have tonic-clonic Sz.     PEA arrest  s/p Epi x 2. ROSC achieved  Takotsubo cardiomyopathy found on Echo   stable  12/13--will ask Cardiology to evaluate for further planning, need for repeat ECHO ?     Acute hypoxemic respiratory failure now chronic  s/p intubation , failed extubation, now trach to vent  c/w vent managment  pulm consulted  daily SBT   12/12 trach sutures removed by surgery     LUE VTE with cellulitis /infected left hand hematoma s/p bedside debridement 12/13   LUE edema and wound of left hand ; good radial pulse   --LUE venous duplex : occlusive thrombus in the left mid subclavian vein with partial   slow flow detected in the lateral subclavian vein.  --LUE arterial Duplex neg   --started heparin drip,  --vascular following   --12/13 Hand surgery consult appreciated, s/p bedside debridement. Dr. Zuñiga would like patient to be on Ancef (d/c keflex)   --ID consulted   --follow cultures     New onset Seizure likely due to anoxic brain injury  discussed with Dr. Loo, patient will be only on Keppra 1500mg bid   off vimpat and Depakote   monitor     ELMER 2 to ATN; resolved    Shock liver.  due to PEA arrest. resolved    DM2   A1c 8.8%  continue with current management   FS goal 140-180   continue with PEG feeds     Hypophosphatemia --repleted     Normocytic anemia   no e/o bleeding or bruising   H/H stable   follow-up anemia labs       Preventative measures   heparin gtt --dvt ppx  fall, aspiration  precautions   HOBE     Dispo: patient is undocumented, will need PRUCOL       Pt is a 71 yo M with h/o COPD, CAD s/p PCI with stent 2015 and CABG 2014, chronic diastolic heart failure (EF 50%), 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 Unknown how long pt unresponsive for at home. Blood sugar in the 40s with EMS s/p glucagon. On arrival to ER pt hypothermic and agonally breathing. Pt became unresponsive with loss of pulse; ACLS initiated. PEA arrest with ROSC after approximately 5 min s/p Epi x2.  Pt intubated during CODE BLUE. Per son pt on the day prior to presentation reported low blood sugar reads on his glucometer in the range of 80s. Son believes pt continued to take his insulin and oral diabetic regimen. Son states that pt was eating but may have been eating less in the last few days. Later on pt noted to have tonic-clonic Sz.     PEA arrest  s/p Epi x 2. ROSC achieved  Takotsubo cardiomyopathy found on Echo   stable  12/13--will ask Cardiology to evaluate for further planning, need for repeat ECHO ?     Acute hypoxemic respiratory failure now chronic  s/p intubation , failed extubation, now trach to vent  c/w vent managment  pulm consulted  daily SBT   12/12 trach sutures removed by surgery     LUE VTE with cellulitis /infected left hand hematoma s/p bedside debridement 12/13   LUE edema and wound of left hand ; good radial pulse   --LUE venous duplex : occlusive thrombus in the left mid subclavian vein with partial   slow flow detected in the lateral subclavian vein.  --LUE arterial Duplex neg   --started heparin drip,  --vascular following   --12/13 Hand surgery consult appreciated, s/p bedside debridement. Dr. Zuñiga would like patient to be on Ancef (d/c keflex)   --ID consulted   --follow cultures     New onset Seizure likely due to anoxic brain injury  discussed with Dr. Loo, patient will be only on Keppra 1500mg bid   off vimpat and Depakote   monitor     ELMER 2 to ATN; resolved    Shock liver.  due to PEA arrest. resolved    DM2   A1c 8.8%  continue with current management   FS goal 140-180   continue with PEG feeds     Hypophosphatemia --repleted     unstageable sacral pressure wound   12/13 PT wound consult , vascular consult for possible debridement     Normocytic anemia   no e/o bleeding or bruising   H/H stable   low normal Vit B12 and folic acid --supplement       Preventative measures   heparin gtt --dvt ppx  fall, aspiration  precautions   HOBE     Dispo: patient is undocumented, will need PRUCOL

## 2022-12-13 NOTE — CONSULT NOTE ADULT - SUBJECTIVE AND OBJECTIVE BOX
[facility]  Division of Infectious Diseases  [phone]    BRANDON CAMARILLO  72y, Male  [id number]    HPI--  This is a 72-year-old man medical history including CHF, coronary artery disease, stent placement, coronary bypass graft surgery, pacemaker placement, diabetes mellitus type 2, CVA with lacunar infarcts, COPD, BPH, and renal insufficiency who is status post tracheostomy and is on a ventilator.  He has had a long and somewhat turbulent hospital course.  He initially presented to the emergency room November 13.  At that point in time he was noted to be hypoglycemic and had been down in the field for an unknown amount of time.  In the emergency room he became unresponsive/pulseless and CODE BLUE was initiated.  Was successfully resuscitated.  The patient had shock liver, resolved, Takotsubo cardiomyopathy,seizures (not clear if due to anoxia versus hypoglycemia versus other), and acute renal failure superimposed on chronically insufficiency (former now resolved).  He was unable to be weaned from the ventilator and ultimately underwent tracheostomy and PEG placement.  He was transferred to the general medical floor.  On 12/11 he was noted to have a left Subclavian vein DVT.  He had marked edema of the hand.  On the same day he was also noted to have skin breakdown of the hand with surrounding erythema.  Today the patient underwent incision and drainage, though it sounds as if the patient had a hematoma rather than an infected collection.  Cefazolin was recommended.  Again the patient is awake, but not a capable historian.    PMH/PSH--  Type 2 diabetes mellitus    Essential hypertension    HTN (hypertension)    HLD (hyperlipidemia)    Diabetes mellitus    Lacunar infarction    BPH (benign prostatic hyperplasia)    CHF (congestive heart failure)    Chronic obstructive pulmonary disease, unspecified COPD type    S/P CABG (coronary artery bypass graft)        Allergies--  No Known Allergies      Medications--  Antibiotics: ceFAZolin   IVPB 1000 milliGRAM(s) IV Intermittent every 8 hours    Immunologic:   Other: acetaminophen     Tablet .. PRN  aluminum hydroxide/magnesium hydroxide/simethicone Suspension PRN  aspirin  chewable  bacitracin   Ointment  chlorhexidine 0.12% Liquid  chlorhexidine 2% Cloths  collagenase Ointment  cyanocobalamin  folic acid  heparin   Injectable PRN  heparin   Injectable PRN  heparin  Infusion.  insulin glargine Injectable (LANTUS)  insulin lispro (ADMELOG) corrective regimen sliding scale  levETIRAcetam  polyethylene glycol 3350  senna  silver sulfADIAZINE 1% Cream  traMADol PRN  valproic  acid Syrup    Antimicrobials last 90 days per EMR: MEDICATIONS  (STANDING):  ceFAZolin   IVPB   100 mL/Hr IV Intermittent (12-13-22 @ 15:20)    cefepime   IVPB   100 mL/Hr IV Intermittent (11-26-22 @ 12:19)    cefepime   IVPB   100 mL/Hr IV Intermittent (12-06-22 @ 06:29)   100 mL/Hr IV Intermittent (12-05-22 @ 22:09)   100 mL/Hr IV Intermittent (12-05-22 @ 14:46)   100 mL/Hr IV Intermittent (12-05-22 @ 05:38)   100 mL/Hr IV Intermittent (12-04-22 @ 23:00)   100 mL/Hr IV Intermittent (12-04-22 @ 13:13)   100 mL/Hr IV Intermittent (12-04-22 @ 06:05)   100 mL/Hr IV Intermittent (12-03-22 @ 22:52)   100 mL/Hr IV Intermittent (12-03-22 @ 14:15)   100 mL/Hr IV Intermittent (12-03-22 @ 05:46)   100 mL/Hr IV Intermittent (12-02-22 @ 21:58)   100 mL/Hr IV Intermittent (12-02-22 @ 15:17)   100 mL/Hr IV Intermittent (12-02-22 @ 05:08)   100 mL/Hr IV Intermittent (12-01-22 @ 21:02)   100 mL/Hr IV Intermittent (12-01-22 @ 14:09)   100 mL/Hr IV Intermittent (12-01-22 @ 05:22)   100 mL/Hr IV Intermittent (11-30-22 @ 21:11)   100 mL/Hr IV Intermittent (11-30-22 @ 15:08)   100 mL/Hr IV Intermittent (11-30-22 @ 05:06)   100 mL/Hr IV Intermittent (11-29-22 @ 21:10)   100 mL/Hr IV Intermittent (11-29-22 @ 15:14)   100 mL/Hr IV Intermittent (11-29-22 @ 05:11)   100 mL/Hr IV Intermittent (11-28-22 @ 21:11)   100 mL/Hr IV Intermittent (11-28-22 @ 13:00)   100 mL/Hr IV Intermittent (11-28-22 @ 05:03)   100 mL/Hr IV Intermittent (11-27-22 @ 21:08)   100 mL/Hr IV Intermittent (11-27-22 @ 13:56)   100 mL/Hr IV Intermittent (11-27-22 @ 05:14)   100 mL/Hr IV Intermittent (11-26-22 @ 21:00)    cephalexin   500 milliGRAM(s) Oral (12-13-22 @ 05:34)   500 milliGRAM(s) Oral (12-12-22 @ 23:26)   500 milliGRAM(s) Oral (12-12-22 @ 17:46)   500 milliGRAM(s) Oral (12-12-22 @ 11:20)   500 milliGRAM(s) Oral (12-12-22 @ 05:03)   500 milliGRAM(s) Oral (12-11-22 @ 23:11)   500 milliGRAM(s) Oral (12-11-22 @ 16:22)    piperacillin/tazobactam IVPB..   25 mL/Hr IV Intermittent (11-17-22 @ 05:35)   25 mL/Hr IV Intermittent (11-16-22 @ 22:56)   25 mL/Hr IV Intermittent (11-16-22 @ 14:22)   25 mL/Hr IV Intermittent (11-16-22 @ 06:12)   25 mL/Hr IV Intermittent (11-15-22 @ 22:16)   25 mL/Hr IV Intermittent (11-15-22 @ 14:57)   25 mL/Hr IV Intermittent (11-15-22 @ 05:28)   25 mL/Hr IV Intermittent (11-14-22 @ 21:05)   25 mL/Hr IV Intermittent (11-14-22 @ 13:45)   25 mL/Hr IV Intermittent (11-14-22 @ 03:37)    piperacillin/tazobactam IVPB...   200 mL/Hr IV Intermittent (11-13-22 @ 22:57)    vancomycin  IVPB   166.67 mL/Hr IV Intermittent (11-26-22 @ 13:00)    vancomycin  IVPB.   250 mL/Hr IV Intermittent (11-13-22 @ 23:58)        Social History--  EtOH: none known.  Tobacco: none known.  Drug use: none known.     Family/Marital History--  No pertinent family history in first degree relatives      Review of Systems:  Review of systems unable secondary to clinical condition.    Physical Exam--  Vital Signs: T(F): 99.3 (12-13-22 @ 10:49), Max: 99.6 (12-13-22 @ 04:26)  HR: 83 (12-13-22 @ 16:00)  BP: 164/81 (12-13-22 @ 10:49)  RR: 19 (12-13-22 @ 10:49)  SpO2: 98% (12-13-22 @ 16:00)  Wt(kg): --  General: Nontoxic-appearing Male in no acute distress.  HEENT: AT/NC.. Anicteric. Conjunctiva pink and moist. Oropharynx unable due to patient compliance.   Neck: Not rigid. No sense of mass. Trach C/D/I  Nodes: None palpable.  Lungs: Diminished BS B  Heart: Regular rate and rhythm.   Abdomen: Bowel sounds present and normoactive. Soft. Nondistended. Nontender. No sense of mass. No organomegaly.  Extremities: No cyanosis or clubbing. LUE wrapped. Dorsum of L hand s/p I&D, clean base, surrounding ecchymitic change, + erythema +edema.   Skin: Warm. Dry. Good turgor. No rash. No vasculitic stigmata.  Psychiatric: Unable      Laboratory & Imaging Data--  CBC                        9.7    7.44  )-----------( 245      ( 13 Dec 2022 04:02 )             31.4       Chemistries  12-13    139  |  100  |  20  ----------------------------<  116<H>  4.3   |  35<H>  |  0.62    Ca    8.6      13 Dec 2022 04:02  Phos  2.8     12-13  Mg     1.8     12-13    TPro  6.1  /  Alb  1.8<L>  /  TBili  0.4  /  DBili  x   /  AST  16  /  ALT  17  /  AlkPhos  96  12-13      Culture Data  None of late

## 2022-12-14 LAB
APTT BLD: 58.7 SEC — HIGH (ref 27.5–35.5)
APTT BLD: 67.3 SEC — HIGH (ref 27.5–35.5)
GLUCOSE BLDC GLUCOMTR-MCNC: 135 MG/DL — HIGH (ref 70–99)
GLUCOSE BLDC GLUCOMTR-MCNC: 139 MG/DL — HIGH (ref 70–99)
GLUCOSE BLDC GLUCOMTR-MCNC: 142 MG/DL — HIGH (ref 70–99)
GLUCOSE BLDC GLUCOMTR-MCNC: 159 MG/DL — HIGH (ref 70–99)
GLUCOSE BLDC GLUCOMTR-MCNC: 172 MG/DL — HIGH (ref 70–99)
GLUCOSE BLDC GLUCOMTR-MCNC: 179 MG/DL — HIGH (ref 70–99)
HCT VFR BLD CALC: 29.8 % — LOW (ref 39–50)
HGB BLD-MCNC: 9.4 G/DL — LOW (ref 13–17)
MCHC RBC-ENTMCNC: 30.4 PG — SIGNIFICANT CHANGE UP (ref 27–34)
MCHC RBC-ENTMCNC: 31.5 G/DL — LOW (ref 32–36)
MCV RBC AUTO: 96.4 FL — SIGNIFICANT CHANGE UP (ref 80–100)
NRBC # BLD: 0 /100 WBCS — SIGNIFICANT CHANGE UP (ref 0–0)
PLATELET # BLD AUTO: 210 K/UL — SIGNIFICANT CHANGE UP (ref 150–400)
RBC # BLD: 3.09 M/UL — LOW (ref 4.2–5.8)
RBC # FLD: 14.5 % — SIGNIFICANT CHANGE UP (ref 10.3–14.5)
WBC # BLD: 7.81 K/UL — SIGNIFICANT CHANGE UP (ref 3.8–10.5)
WBC # FLD AUTO: 7.81 K/UL — SIGNIFICANT CHANGE UP (ref 3.8–10.5)

## 2022-12-14 PROCEDURE — 99232 SBSQ HOSP IP/OBS MODERATE 35: CPT

## 2022-12-14 PROCEDURE — 99233 SBSQ HOSP IP/OBS HIGH 50: CPT

## 2022-12-14 RX ADMIN — Medication 650 MILLIGRAM(S): at 17:41

## 2022-12-14 RX ADMIN — Medication 1 APPLICATION(S): at 05:34

## 2022-12-14 RX ADMIN — Medication 100 MILLIGRAM(S): at 13:15

## 2022-12-14 RX ADMIN — Medication 100 MILLIGRAM(S): at 05:33

## 2022-12-14 RX ADMIN — HEPARIN SODIUM 1300 UNIT(S)/HR: 5000 INJECTION INTRAVENOUS; SUBCUTANEOUS at 03:36

## 2022-12-14 RX ADMIN — Medication 650 MILLIGRAM(S): at 16:36

## 2022-12-14 RX ADMIN — Medication 500 MILLIGRAM(S): at 23:50

## 2022-12-14 RX ADMIN — Medication 25 MILLIGRAM(S): at 17:56

## 2022-12-14 RX ADMIN — Medication 100 MILLIGRAM(S): at 23:51

## 2022-12-14 RX ADMIN — Medication 500 MILLIGRAM(S): at 05:35

## 2022-12-14 RX ADMIN — TRAMADOL HYDROCHLORIDE 25 MILLIGRAM(S): 50 TABLET ORAL at 15:37

## 2022-12-14 RX ADMIN — Medication 2: at 12:31

## 2022-12-14 RX ADMIN — CHLORHEXIDINE GLUCONATE 15 MILLILITER(S): 213 SOLUTION TOPICAL at 05:35

## 2022-12-14 RX ADMIN — CHLORHEXIDINE GLUCONATE 15 MILLILITER(S): 213 SOLUTION TOPICAL at 17:49

## 2022-12-14 RX ADMIN — HEPARIN SODIUM 1300 UNIT(S)/HR: 5000 INJECTION INTRAVENOUS; SUBCUTANEOUS at 07:25

## 2022-12-14 RX ADMIN — HEPARIN SODIUM 1300 UNIT(S)/HR: 5000 INJECTION INTRAVENOUS; SUBCUTANEOUS at 19:24

## 2022-12-14 RX ADMIN — TRAMADOL HYDROCHLORIDE 25 MILLIGRAM(S): 50 TABLET ORAL at 14:27

## 2022-12-14 RX ADMIN — Medication 81 MILLIGRAM(S): at 12:19

## 2022-12-14 RX ADMIN — Medication 650 MILLIGRAM(S): at 00:36

## 2022-12-14 RX ADMIN — Medication 500 MILLIGRAM(S): at 13:16

## 2022-12-14 RX ADMIN — Medication 1 MILLIGRAM(S): at 12:17

## 2022-12-14 RX ADMIN — HEPARIN SODIUM 1300 UNIT(S)/HR: 5000 INJECTION INTRAVENOUS; SUBCUTANEOUS at 12:15

## 2022-12-14 RX ADMIN — Medication 1 APPLICATION(S): at 17:48

## 2022-12-14 RX ADMIN — PREGABALIN 1000 MICROGRAM(S): 225 CAPSULE ORAL at 12:30

## 2022-12-14 RX ADMIN — INSULIN GLARGINE 20 UNIT(S): 100 INJECTION, SOLUTION SUBCUTANEOUS at 08:33

## 2022-12-14 RX ADMIN — Medication 650 MILLIGRAM(S): at 23:50

## 2022-12-14 RX ADMIN — Medication 25 MILLIGRAM(S): at 05:35

## 2022-12-14 RX ADMIN — ATORVASTATIN CALCIUM 20 MILLIGRAM(S): 80 TABLET, FILM COATED ORAL at 23:50

## 2022-12-14 RX ADMIN — HEPARIN SODIUM 1300 UNIT(S)/HR: 5000 INJECTION INTRAVENOUS; SUBCUTANEOUS at 03:40

## 2022-12-14 RX ADMIN — Medication 1 APPLICATION(S): at 17:49

## 2022-12-14 RX ADMIN — Medication 2: at 23:50

## 2022-12-14 RX ADMIN — LEVETIRACETAM 1500 MILLIGRAM(S): 250 TABLET, FILM COATED ORAL at 17:51

## 2022-12-14 RX ADMIN — LEVETIRACETAM 1500 MILLIGRAM(S): 250 TABLET, FILM COATED ORAL at 05:35

## 2022-12-14 RX ADMIN — CHLORHEXIDINE GLUCONATE 1 APPLICATION(S): 213 SOLUTION TOPICAL at 05:34

## 2022-12-14 NOTE — PROGRESS NOTE ADULT - SUBJECTIVE AND OBJECTIVE BOX
Vascular Surgery Progress Note     Subjective/24hour Events:     Vital Signs:  Vital Signs Last 24 Hrs  T(C): 37.1 (14 Dec 2022 04:14), Max: 38.1 (13 Dec 2022 23:27)  T(F): 98.7 (14 Dec 2022 04:14), Max: 100.6 (13 Dec 2022 23:27)  HR: 87 (14 Dec 2022 14:33) (82 - 95)  BP: 129/69 (14 Dec 2022 14:33) (129/69 - 170/79)  BP(mean): --  RR: 18 (14 Dec 2022 14:33) (17 - 19)  SpO2: 96% (14 Dec 2022 14:33) (96% - 100%)    Parameters below as of 14 Dec 2022 14:33  Patient On (Oxygen Delivery Method): ventilator        CAPILLARY BLOOD GLUCOSE      POCT Blood Glucose.: 172 mg/dL (14 Dec 2022 12:04)  POCT Blood Glucose.: 179 mg/dL (14 Dec 2022 08:32)  POCT Blood Glucose.: 135 mg/dL (14 Dec 2022 05:01)  POCT Blood Glucose.: 220 mg/dL (13 Dec 2022 23:26)  POCT Blood Glucose.: 179 mg/dL (13 Dec 2022 19:50)  POCT Blood Glucose.: 196 mg/dL (13 Dec 2022 17:14)      I&O's Detail    13 Dec 2022 07:01  -  14 Dec 2022 07:00  --------------------------------------------------------  IN:    Enteral Tube Flush: 400 mL    Heparin Infusion: 156 mL    IV PiggyBack: 100 mL    Vital1.0: 550 mL  Total IN: 1206 mL    OUT:    Stool (mL): 1 mL  Total OUT: 1 mL    Total NET: 1205 mL          MEDICATIONS  (STANDING):  aspirin  chewable 81 milliGRAM(s) Oral daily  atorvastatin 20 milliGRAM(s) Oral at bedtime  bacitracin   Ointment 1 Application(s) Topical two times a day  ceFAZolin   IVPB 1000 milliGRAM(s) IV Intermittent every 8 hours  chlorhexidine 0.12% Liquid 15 milliLiter(s) Oral Mucosa every 12 hours  chlorhexidine 2% Cloths 1 Application(s) Topical <User Schedule>  collagenase Ointment 1 Application(s) Topical two times a day  cyanocobalamin 1000 MICROGram(s) Oral daily  folic acid 1 milliGRAM(s) Oral daily  heparin  Infusion.  Unit(s)/Hr (16 mL/Hr) IV Continuous <Continuous>  insulin glargine Injectable (LANTUS) 20 Unit(s) SubCutaneous every morning  insulin lispro (ADMELOG) corrective regimen sliding scale   SubCutaneous every 6 hours  levETIRAcetam 1500 milliGRAM(s) Oral two times a day  metoprolol tartrate 25 milliGRAM(s) Oral two times a day  polyethylene glycol 3350 17 Gram(s) Oral daily  senna 2 Tablet(s) Oral at bedtime  silver sulfADIAZINE 1% Cream 1 Application(s) Topical two times a day  valproic  acid Syrup 500 milliGRAM(s) Enteral Tube every 8 hours    MEDICATIONS  (PRN):  acetaminophen     Tablet .. 650 milliGRAM(s) Oral every 6 hours PRN Temp greater or equal to 38C (100.4F), Mild Pain (1 - 3)  aluminum hydroxide/magnesium hydroxide/simethicone Suspension 30 milliLiter(s) Oral every 4 hours PRN Dyspepsia  heparin   Injectable 7000 Unit(s) IV Push every 6 hours PRN For aPTT less than 40  heparin   Injectable 3500 Unit(s) IV Push every 6 hours PRN For aPTT between 40 - 57  traMADol 25 milliGRAM(s) Oral every 8 hours PRN Severe Pain (7 - 10)      Physical Exam:  Gen: NAD. trach to vent.   LUE: continued swelling. Left hand dorsum s/p I&D by plastic surgery.     Labs:    12-13    139  |  100  |  20  ----------------------------<  116<H>  4.3   |  35<H>  |  0.62    Ca    8.6      13 Dec 2022 04:02  Phos  2.8     12-13  Mg     1.8     12-13    TPro  6.1  /  Alb  1.8<L>  /  TBili  0.4  /  DBili  x   /  AST  16  /  ALT  17  /  AlkPhos  96  12-13    LIVER FUNCTIONS - ( 13 Dec 2022 04:02 )  Alb: 1.8 g/dL / Pro: 6.1 gm/dL / ALK PHOS: 96 U/L / ALT: 17 U/L / AST: 16 U/L / GGT: x                                 9.4    7.81  )-----------( 210      ( 14 Dec 2022 10:15 )             29.8     PTT - ( 14 Dec 2022 10:15 )  PTT:58.7 sec

## 2022-12-14 NOTE — PROGRESS NOTE ADULT - ASSESSMENT
Patient with left arm swelling, left subclavian vein DVT.   Also with left hand wound.    Local wound care to left hand per plastic surgery.   Continue ac for treatment of DVT.   Recommend left arm compression and elevation.

## 2022-12-14 NOTE — PROGRESS NOTE ADULT - ASSESSMENT
Status post incision and drainage of the left hand.  Change at this juncture appear to be mostly related to the DVT more than anything else I expect.  Unclear if the hematoma drained was secondarily infected, or not.  Culture is pending.  No leukocytosis.  He has had intermittent low-grade fevers through the beginning of this month, with higher grade fevers before that.    12/14: low grade temp last night and today around 16:00, no leukocytosis, left hand s/p I&D - culture is pending, cefazolin IV continued.     Suggestions:  awaiting for culture s/p I&D  continue cefazolin for now  trend pt's temperatures  monitor cbc  monitor pt's respiratory status  will obtain two sets of surveillance BCs, all prior BCs with no growth to date    Discussed with Dr. Chambers

## 2022-12-14 NOTE — PROGRESS NOTE ADULT - ASSESSMENT
REVIEW OF SYMPTOMS      Able to give (reliable) ROS  NO     PHYSICAL EXAM    HEENT Unremarkable  atraumatic   RESP Fair air entry EXP prolonged    Harsh breath sound Resp distres mild   CARDIAC S1 S2 No S3     NO JVD    ABDOMEN SOFT BS PRESENT NOT DISTENDED No hepatosplenomegaly   PEDAL EDEMA present No calf tenderness  NO rash       GENERAL DATA .   GOC.  12/11/2022 full code       ALLGY.  nka                            WT.   ..  12/2/2022 86  BMI.   ..    12/2/2022 29                          ICU STAY.   .. 11/29-12/9  COVID.   .. 12/2/2022 scv2 (-)   ..  11/20/2022 scv2 (-)   BEST PRACTICE ISSUES.    HOB ELEVATN. Yes  DVT PPLX.   12/12/2022 iv ufh Dr RANDALL ( l sc thrombus)    ALEJO PPLX.   ..      INFN PPLX.   ..  11/25 chlorhex .12%   .. 11/14 chlorhex 2%   SP SW ANTWAN.         DIET.    ..  12/8 vital 1.2 1200 gt   IV fl.  ..            PROCEDURES.  .. 12/5/2022 trach  .. 12/5/2022 peg       ABGS.  12/3/2022 ac 14/450/.3/5 750/46/75      VS/ PO/IO/ VENT/ DRIPS.   12/14/2022 99f 86 140/70   12/14/2022 1p cpap 5/10/.3 svt 319 r 30     PATIENT PRESENTATION.  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 Unknown how long pt unresponsive for at home. Blood sugar in the 40s with EMS s/p glucagon. On arrival to ER pt hypothermic and agonally breathing. Pt became unresponsive with loss of pulse; ACLS initiated. PEA arrest with ROSC after approximately 5 min s/p Epi x2.  Pt intubated during CODE BLUE. Per son pt on the day prior to presentation reported low blood sugar reads on his glucometer in the range of 80s. Son believes pt continued to take his insulin and oral diabetic regimen. Son states that pt was eating but may have been eating less in the last few days. Later on pt noted to have tonic-clonic Sz. ICU dx: 1) Hypoglycemia 2) PEA arrest 3) Takotsubo cardiomyopathy found on Echo 4) New onset Sz either 2 to hypoglycemia vs anoxic injury 5) ELMER 2 to ATN 6) Shock liver. Pt with improvement in MS. s/p trach/PEG on 12/5  Pt downgraded to floor  Pulm consulted 12/11/2022         PROBLEMS.  Sp  cac 11/14/2022   .. 11/14/2022 cac preceded by hypoglycemia followed by anoxic encephalopathy   vent management.   VTE.  .. 12/4 v duplx (-)  .. 11/14 cta ch no pe ac fx lat r rib 4-6 sub cm l lo lobe nodules   .. 12/12/2022 v duplx lue  occlusive thrombosis l mid subclav  partial slow flow in lat subclav   .. 12/12/2022 iv ufh  Trach 12/5   Infection.  .. w 12/10 w 9.1   .. cxr 12/6 trach resolutn of plate atelectasis l base   .. mrsa 11/26 (-)   Cellulitis l hand .  .. 12/12/2022 cephalexin 500.4 x 5d   CAD.  .. PMH CABG 2014 Stent 2015   .. 11/14/2022 ekg paced qtc 568   .. 11/14 asa 81   CHF.   .. 11/14/2022 echo mod decr segmental lvsf apical lateral apiccal ant segment are abn takotsubo cmpthy pasp 42 .  anemia.  .. Hb 12/10-12/11 Hb 9.6 - 9.5   CONSTIPN.  .. 11/21 miralax   .. 11/21 senna   .. 11/20 keppra   DM.  .. 11/21 insulin  DECUB.  .. 12/2 collagenase   SEIZURES.  .. 11/23 valproic 500.3        ASSESSMENT RECOMMENDATIONS..  Sp  cac 11/14/2022   .. Patient is interactive and answers questions appropriately as dw son on 12/13/2022   VTE.  .. 12/12/2022 Pt found to have l subclav dvt    12/12/2022 iv ufh startd   Trach 12/5   .. trach care   vent management.  .. vent bundle dsbt dsv target pplat 30 (-) PO2 60 (+) pH 730 (+)   weaming.  12/13-12/14/2022 tolerated cpap 12/13-12/14/20 rsbi 78-94   Infection.  ..  Monitor for vap    cellulitis hand   .. 12/12/2022 started on 5 d course cephalexin for cellulitis   CAD.  .. Monitor   CHF.   .. 11/14/2022 echo mod decr segmental lvsf apical lateral apiccal ant segment are abn takotsubo cmpthy pasp 42 .  .. monitor for chf   anemia.  .. Monitor  Target Hb 7 (+)   DECUB.  .. 12/2 collagenase   SEIZURES.  .. sz meds as guided by neuro   NONVIOLENT NONSELF DESTRUCTIVE LEVEL 1   .. 12/13/2022     TIME SPENT   Over 25 minutes aggregate care time spent on encounter; activities included   direct patient care, counseling and/or coordinating care reviewing notes, lab data/ imaging , discussion with multidisciplinary team/ patient  /family and explaining in detail risks, benefits, alternatives  of the recommendations     MARQUISE TAYLOR 72 m 11/14/2022 1948 Dr Leela Dowling

## 2022-12-14 NOTE — PROGRESS NOTE ADULT - SUBJECTIVE AND OBJECTIVE BOX
BRANDON CAMARILLO    [address] [address]    Allergies    No Known Allergies    Intolerances        PAST MEDICAL & SURGICAL HISTORY:  HTN (hypertension)      HLD (hyperlipidemia)      Diabetes mellitus      Lacunar infarction      BPH (benign prostatic hyperplasia)      CHF (congestive heart failure)      Chronic obstructive pulmonary disease, unspecified COPD type      S/P CABG (coronary artery bypass graft)  2014          FAMILY HISTORY:      Home Medications:  aspirin 81 mg oral delayed release tablet: 1 tab(s) orally once a day (12 Jun 2020 17:35)  Liquifilm Tears preserved ophthalmic solution: 1 drop(s) to each affected eye 2 times a day (12 Jun 2020 17:35)      MEDICATIONS  (STANDING):  aspirin  chewable 81 milliGRAM(s) Oral daily  atorvastatin 20 milliGRAM(s) Oral at bedtime  bacitracin   Ointment 1 Application(s) Topical two times a day  ceFAZolin   IVPB 1000 milliGRAM(s) IV Intermittent every 8 hours  chlorhexidine 0.12% Liquid 15 milliLiter(s) Oral Mucosa every 12 hours  chlorhexidine 2% Cloths 1 Application(s) Topical <User Schedule>  collagenase Ointment 1 Application(s) Topical two times a day  cyanocobalamin 1000 MICROGram(s) Oral daily  folic acid 1 milliGRAM(s) Oral daily  heparin  Infusion.  Unit(s)/Hr (16 mL/Hr) IV Continuous <Continuous>  insulin glargine Injectable (LANTUS) 20 Unit(s) SubCutaneous every morning  insulin lispro (ADMELOG) corrective regimen sliding scale   SubCutaneous every 6 hours  levETIRAcetam 1500 milliGRAM(s) Oral two times a day  metoprolol tartrate 25 milliGRAM(s) Oral two times a day  polyethylene glycol 3350 17 Gram(s) Oral daily  senna 2 Tablet(s) Oral at bedtime  silver sulfADIAZINE 1% Cream 1 Application(s) Topical two times a day  valproic  acid Syrup 500 milliGRAM(s) Enteral Tube every 8 hours    MEDICATIONS  (PRN):  acetaminophen     Tablet .. 650 milliGRAM(s) Oral every 6 hours PRN Temp greater or equal to 38C (100.4F), Mild Pain (1 - 3)  aluminum hydroxide/magnesium hydroxide/simethicone Suspension 30 milliLiter(s) Oral every 4 hours PRN Dyspepsia  heparin   Injectable 7000 Unit(s) IV Push every 6 hours PRN For aPTT less than 40  heparin   Injectable 3500 Unit(s) IV Push every 6 hours PRN For aPTT between 40 - 57  traMADol 25 milliGRAM(s) Oral every 8 hours PRN Severe Pain (7 - 10)      Diet, NPO with Tube Feed:   Tube Feeding Modality: Gastrostomy  Vital AF 1.2  Total Volume for 24 Hours (mL): 1200  Continuous  Starting Tube Feed Rate mL per Hour: 45  Increase Tube Feed Rate by (mL): 5     Every 12 hours  Until Goal Tube Feed Rate (mL per Hour): 50  Tube Feed Duration (in Hours): 24  Tube Feed Start Time: 15:00 (12-08-22 @ 14:46) [Active]          Vital Signs Last 24 Hrs  T(C): 37.1 (14 Dec 2022 04:14), Max: 38.1 (13 Dec 2022 23:27)  T(F): 98.7 (14 Dec 2022 04:14), Max: 100.6 (13 Dec 2022 23:27)  HR: 88 (14 Dec 2022 05:52) (72 - 95)  BP: 170/79 (14 Dec 2022 04:14) (134/82 - 170/79)  BP(mean): --  RR: 19 (14 Dec 2022 04:14) (17 - 19)  SpO2: 100% (14 Dec 2022 05:52) (98% - 100%)    Parameters below as of 13 Dec 2022 23:27  Patient On (Oxygen Delivery Method): ventilator          12-13-22 @ 07:01  -  12-14-22 @ 07:00  --------------------------------------------------------  IN: 1206 mL / OUT: 1 mL / NET: 1205 mL        Mode: AC/ CMV (Assist Control/ Continuous Mandatory Ventilation), RR (machine): 14, TV (machine): 450, FiO2: 30, PEEP: 5, ITime: 1, MAP: 12, PIP: 20      LABS:                        9.7    7.44  )-----------( 245      ( 13 Dec 2022 04:02 )             31.4     12-13    139  |  100  |  20  ----------------------------<  116<H>  4.3   |  35<H>  |  0.62    Ca    8.6      13 Dec 2022 04:02  Phos  2.8     12-13  Mg     1.8     12-13    TPro  6.1  /  Alb  1.8<L>  /  TBili  0.4  /  DBili  x   /  AST  16  /  ALT  17  /  AlkPhos  96  12-13    PT/INR - ( 12 Dec 2022 08:25 )   PT: 12.7 sec;   INR: 1.07 ratio         PTT - ( 14 Dec 2022 02:42 )  PTT:67.3 sec          WBC:  WBC Count: 7.44 K/uL (12-13 @ 04:02)  WBC Count: 7.96 K/uL (12-12 @ 17:45)  WBC Count: 7.96 K/uL (12-12 @ 06:35)      MICROBIOLOGY:  RECENT CULTURES:              PT/INR - ( 12 Dec 2022 08:25 )   PT: 12.7 sec;   INR: 1.07 ratio         PTT - ( 14 Dec 2022 02:42 )  PTT:67.3 sec    Sodium:  Sodium, Serum: 139 mmol/L (12-13 @ 04:02)  Sodium, Serum: 137 mmol/L (12-12 @ 06:35)      0.62 mg/dL 12-13 @ 04:02  0.59 mg/dL 12-12 @ 06:35      Hemoglobin:  Hemoglobin: 9.7 g/dL (12-13 @ 04:02)  Hemoglobin: 9.5 g/dL (12-12 @ 17:45)  Hemoglobin: 9.9 g/dL (12-12 @ 06:35)      Platelets: Platelet Count - Automated: 245 K/uL (12-13 @ 04:02)  Platelet Count - Automated: 248 K/uL (12-12 @ 17:45)  Platelet Count - Automated: 256 K/uL (12-12 @ 06:35)      LIVER FUNCTIONS - ( 13 Dec 2022 04:02 )  Alb: 1.8 g/dL / Pro: 6.1 gm/dL / ALK PHOS: 96 U/L / ALT: 17 U/L / AST: 16 U/L / GGT: x                 RADIOLOGY & ADDITIONAL STUDIES:      MICROBIOLOGY:  RECENT CULTURES:

## 2022-12-14 NOTE — PROGRESS NOTE ADULT - SUBJECTIVE AND OBJECTIVE BOX
BRANDON CAMARILLO  MRN-[id number]    Follow Up:  possible cellulitis with abscess     Interval History: the pt was seen and examined earlier, no distress, awake, with tracheostomy. The pt had a low grade temp at midnight 100.6 and now had another another low grade temp again 100.4, no leukocytosis.     PAST MEDICAL & SURGICAL HISTORY:  HTN (hypertension)      HLD (hyperlipidemia)      Diabetes mellitus      Lacunar infarction      BPH (benign prostatic hyperplasia)      CHF (congestive heart failure)      Chronic obstructive pulmonary disease, unspecified COPD type      S/P CABG (coronary artery bypass graft)  2014          ROS:    [ x] Unobtainable because: with tracheostomy, does not answer questions   [ ] All other systems negative    Constitutional: no fever, no chills  Head: no trauma  Eyes: no vision changes, no eye pain  ENT:  no sore throat, no rhinorrhea  Cardiovascular:  no chest pain, no palpitation  Respiratory:  no SOB, no cough  GI:  no abd pain, no vomiting, no diarrhea  urinary: no dysuria, no hematuria, no flank pain  musculoskeletal:  no joint pain, no joint swelling  skin:  no rash  neurology:  no headache, no seizure, no change in mental status  psych: no anxiety, no depression         Allergies  No Known Allergies        ANTIMICROBIALS:  ceFAZolin   IVPB 1000 every 8 hours      OTHER MEDS:  acetaminophen     Tablet .. 650 milliGRAM(s) Oral every 6 hours PRN  aluminum hydroxide/magnesium hydroxide/simethicone Suspension 30 milliLiter(s) Oral every 4 hours PRN  aspirin  chewable 81 milliGRAM(s) Oral daily  atorvastatin 20 milliGRAM(s) Oral at bedtime  bacitracin   Ointment 1 Application(s) Topical two times a day  chlorhexidine 0.12% Liquid 15 milliLiter(s) Oral Mucosa every 12 hours  chlorhexidine 2% Cloths 1 Application(s) Topical <User Schedule>  collagenase Ointment 1 Application(s) Topical two times a day  cyanocobalamin 1000 MICROGram(s) Oral daily  folic acid 1 milliGRAM(s) Oral daily  heparin   Injectable 7000 Unit(s) IV Push every 6 hours PRN  heparin   Injectable 3500 Unit(s) IV Push every 6 hours PRN  heparin  Infusion.  Unit(s)/Hr IV Continuous <Continuous>  insulin glargine Injectable (LANTUS) 20 Unit(s) SubCutaneous every morning  insulin lispro (ADMELOG) corrective regimen sliding scale   SubCutaneous every 6 hours  levETIRAcetam 1500 milliGRAM(s) Oral two times a day  metoprolol tartrate 25 milliGRAM(s) Oral two times a day  polyethylene glycol 3350 17 Gram(s) Oral daily  senna 2 Tablet(s) Oral at bedtime  silver sulfADIAZINE 1% Cream 1 Application(s) Topical two times a day  traMADol 25 milliGRAM(s) Oral every 8 hours PRN  valproic  acid Syrup 500 milliGRAM(s) Enteral Tube every 8 hours      Vital Signs Last 24 Hrs  T(C): 38 (14 Dec 2022 16:17), Max: 38.1 (13 Dec 2022 23:27)  T(F): 100.4 (14 Dec 2022 16:17), Max: 100.6 (13 Dec 2022 23:27)  HR: 94 (14 Dec 2022 16:17) (82 - 95)  BP: 150/77 (14 Dec 2022 16:17) (129/69 - 170/79)  BP(mean): --  RR: 18 (14 Dec 2022 16:17) (18 - 19)  SpO2: 97% (14 Dec 2022 16:17) (96% - 100%)    Parameters below as of 14 Dec 2022 14:33  Patient On (Oxygen Delivery Method): ventilator        Physical Exam:  General: Nontoxic-appearing Male in no acute distress.  HEENT: AT/NC.. Anicteric. Conjunctiva pink and moist. Oropharynx unable due to patient compliance.   Neck: Not rigid. No sense of mass. Trach C/D/I  Nodes: None palpable.  Lungs: Diminished BS Bilaterally   Heart: Regular rate and rhythm.   Abdomen: Bowel sounds present and normoactive. Soft. Nondistended. Nontender. No sense of mass. No organomegaly. PEG in place c/d/i  Extremities: No cyanosis or clubbing. LUE wrapped s/p I&D yesterday   Skin: Warm. Dry. Good turgor. No rash. No vasculitic stigmata.  Psychiatric: Unable to assess     WBC Count: 7.81 K/uL (12-14 @ 10:15)  WBC Count: 7.44 K/uL (12-13 @ 04:02)  WBC Count: 7.96 K/uL (12-12 @ 17:45)  WBC Count: 7.96 K/uL (12-12 @ 06:35)  WBC Count: 9.12 K/uL (12-10 @ 06:38)  WBC Count: 9.46 K/uL (12-09 @ 16:30)  WBC Count: 9.89 K/uL (12-08 @ 05:04)                            9.4    7.81  )-----------( 210      ( 14 Dec 2022 10:15 )             29.8       12-13    139  |  100  |  20  ----------------------------<  116<H>  4.3   |  35<H>  |  0.62    Ca    8.6      13 Dec 2022 04:02  Phos  2.8     12-13  Mg     1.8     12-13    TPro  6.1  /  Alb  1.8<L>  /  TBili  0.4  /  DBili  x   /  AST  16  /  ALT  17  /  AlkPhos  96  12-13          Creatinine Trend: 0.62<--, 0.59<--, 0.60<--, 0.60<--, 0.69<--, 0.79<--      MICROBIOLOGY:  v  Clean Catch Clean Catch (Midstream)  11-26-22   >100,000 CFU/ml Escherichia coli  --  Escherichia coli      ET Tube ET Tube  11-25-22   Normal Respiratory Larissa present  --    Rare polymorphonuclear leukocytes per low power field  Rare Squamous epithelial cells per low power field  Few Gram Negative Rods per oil power field      .Blood Blood-Peripheral  11-25-22   No Growth Final  --  --      .Blood Blood  11-19-22   No Growth Final  --  --      .Blood Blood  11-18-22   No Growth Final  --  --      ET Tube ET Tube  11-18-22   Few Aspergillus fumigatus complex  Normal Respiratory Larissa present  --    Few polymorphonuclear leukocytes per low power field  Few Squamous epithelial cells per low power field  Moderate Yeast per oil power field  Few Gram Positive Cocci in Clusters per oil power field        Ferritin, Serum: 536 (12-13)      SARS-CoV-2: NotDetec (03 Dec 2022 09:45)  SARS-CoV-2: NotDetec (02 Dec 2022 01:55)  SARS-CoV-2: NotDetec (30 Nov 2022 02:30)  SARS-CoV-2: NotDetec (20 Nov 2022 09:10)    RADIOLOGY:

## 2022-12-14 NOTE — PROGRESS NOTE ADULT - SUBJECTIVE AND OBJECTIVE BOX
Patient is a 72y old  Male who presents with a chief complaint of s/p cardiac arrest, hypoglycemia (14 Dec 2022 07:22)    INTERVAL HPI/OVERNIGHT EVENTS: no events     MEDICATIONS  (STANDING):  aspirin  chewable 81 milliGRAM(s) Oral daily  atorvastatin 20 milliGRAM(s) Oral at bedtime  bacitracin   Ointment 1 Application(s) Topical two times a day  ceFAZolin   IVPB 1000 milliGRAM(s) IV Intermittent every 8 hours  chlorhexidine 0.12% Liquid 15 milliLiter(s) Oral Mucosa every 12 hours  chlorhexidine 2% Cloths 1 Application(s) Topical <User Schedule>  collagenase Ointment 1 Application(s) Topical two times a day  cyanocobalamin 1000 MICROGram(s) Oral daily  folic acid 1 milliGRAM(s) Oral daily  heparin  Infusion.  Unit(s)/Hr (16 mL/Hr) IV Continuous <Continuous>  insulin glargine Injectable (LANTUS) 20 Unit(s) SubCutaneous every morning  insulin lispro (ADMELOG) corrective regimen sliding scale   SubCutaneous every 6 hours  levETIRAcetam 1500 milliGRAM(s) Oral two times a day  metoprolol tartrate 25 milliGRAM(s) Oral two times a day  polyethylene glycol 3350 17 Gram(s) Oral daily  senna 2 Tablet(s) Oral at bedtime  silver sulfADIAZINE 1% Cream 1 Application(s) Topical two times a day  valproic  acid Syrup 500 milliGRAM(s) Enteral Tube every 8 hours    MEDICATIONS  (PRN):  acetaminophen     Tablet .. 650 milliGRAM(s) Oral every 6 hours PRN Temp greater or equal to 38C (100.4F), Mild Pain (1 - 3)  aluminum hydroxide/magnesium hydroxide/simethicone Suspension 30 milliLiter(s) Oral every 4 hours PRN Dyspepsia  heparin   Injectable 7000 Unit(s) IV Push every 6 hours PRN For aPTT less than 40  heparin   Injectable 3500 Unit(s) IV Push every 6 hours PRN For aPTT between 40 - 57  traMADol 25 milliGRAM(s) Oral every 8 hours PRN Severe Pain (7 - 10)    Allergies    No Known Allergies    Intolerances      REVIEW OF SYSTEMS:  All other systems reviewed and are negative    Vital Signs Last 24 Hrs  T(C): 37.1 (14 Dec 2022 04:14), Max: 38.1 (13 Dec 2022 23:27)  T(F): 98.7 (14 Dec 2022 04:14), Max: 100.6 (13 Dec 2022 23:27)  HR: 89 (14 Dec 2022 13:39) (82 - 95)  BP: 170/79 (14 Dec 2022 04:14) (134/82 - 170/79)  BP(mean): --  RR: 19 (14 Dec 2022 04:14) (17 - 19)  SpO2: 96% (14 Dec 2022 13:39) (96% - 100%)    Parameters below as of 13 Dec 2022 23:27  Patient On (Oxygen Delivery Method): ventilator      Daily     Daily Weight in k.8 (14 Dec 2022 04:14)  I&O's Summary    13 Dec 2022 07:01  -  14 Dec 2022 07:00  --------------------------------------------------------  IN: 1206 mL / OUT: 1 mL / NET: 1205 mL      CAPILLARY BLOOD GLUCOSE      POCT Blood Glucose.: 172 mg/dL (14 Dec 2022 12:04)  POCT Blood Glucose.: 179 mg/dL (14 Dec 2022 08:32)  POCT Blood Glucose.: 135 mg/dL (14 Dec 2022 05:01)  POCT Blood Glucose.: 220 mg/dL (13 Dec 2022 23:26)  POCT Blood Glucose.: 179 mg/dL (13 Dec 2022 19:50)  POCT Blood Glucose.: 196 mg/dL (13 Dec 2022 17:14)    PHYSICAL EXAM:  GENERAL: NAD,  on trach to vent   HEAD:  Atraumatic, Normocephalic  EYES: EOMI, PERRLA, conjunctiva and sclera clear  ENMT: No tonsillar erythema, exudates, or enlargement; Moist mucous membranes   NECK: Supple, No JVD, +TRACH  NERVOUS SYSTEM:  awake and alert,  moving extremities spontaneously   CHEST/LUNG: CTAB;  No rales, rhonchi, wheezing, or rubs  HEART: Regular rate and rhythm; No murmurs, rubs, or gallops  ABDOMEN: Soft, Nontender, Nondistended; Bowel sounds present; +PEG   EXTREMITIES:   left hand dressing cdi     Labs                          9.4    7.81  )-----------( 210      ( 14 Dec 2022 10:15 )             29.8     12-13    139  |  100  |  20  ----------------------------<  116<H>  4.3   |  35<H>  |  0.62    Ca    8.6      13 Dec 2022 04:02  Phos  2.8     12-13  Mg     1.8     12-13    TPro  6.1  /  Alb  1.8<L>  /  TBili  0.4  /  DBili  x   /  AST  16  /  ALT  17  /  AlkPhos  96  12-13    PTT - ( 14 Dec 2022 10:15 )  PTT:58.7 sec                    DVT prophylaxis: > Lovenox 40mg SQ daily  > Heparin   > SCD's

## 2022-12-14 NOTE — PROGRESS NOTE ADULT - ASSESSMENT
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 Unknown how long pt unresponsive for at home. Blood sugar in the 40s with EMS s/p glucagon. On arrival to ER pt hypothermic and agonally breathing. Pt became unresponsive with loss of pulse; ACLS initiated. PEA arrest with ROSC after approximately 5 min s/p Epi x2.  Pt intubated during CODE BLUE. Per son pt on the day prior to presentation reported low blood sugar reads on his glucometer in the range of 80s. Son believes pt continued to take his insulin and oral diabetic regimen. Son states that pt was eating but may have been eating less in the last few days. Later on pt noted to have tonic-clonic Sz.     PEA arrest  s/p Epi x 2. ROSC achieved  Takotsubo cardiomyopathy found on Echo   stable  12/13--cardio eval appreciated, further management can be done as outpatient     Acute hypoxemic respiratory failure now chronic  s/p intubation , failed extubation, now trach to vent  c/w vent managment  pulm consulted  daily SBT   12/12 trach sutures removed by surgery     LUE VTE with cellulitis /infected left hand hematoma s/p bedside debridement 12/13   LUE edema and wound of left hand ; good radial pulse   --LUE venous duplex : occlusive thrombus in the left mid subclavian vein with partial   slow flow detected in the lateral subclavian vein.  --LUE arterial Duplex neg   --started heparin drip,  --vascular following   --12/13 Hand surgery consult appreciated, s/p bedside debridement. Dr. Zuñiga would like patient to be on Ancef (d/c keflex)   --ID consult appreciated  --follow cultures , for antibiotic management     New onset Seizure likely due to anoxic brain injury  discussed with Dr. Loo, patient will be only on Keppra 1500mg bid   off vimpat and Depakote   monitor     ELMER 2 to ATN; resolved    Shock liver.  due to PEA arrest. resolved    DM2   A1c 8.8%  continue with current management   FS goal 140-180   continue with PEG feeds     Hypophosphatemia --repleted     unstageable sacral pressure wound   12/13 PT wound consult , vascular consult for possible debridement     Normocytic anemia   no e/o bleeding or bruising   H/H stable   low normal Vit B12 and folic acid --supplement       Preventative measures   heparin gtt --dvt ppx  fall, aspiration  precautions   HOBE     Dispo: patient is undocumented, will need PRUCOL 71 yo M with h/o COPD, CAD s/p PCI with stent 2015 and CABG 2014, chronic diastolic heart failure (EF 50%), 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 Unknown how long pt unresponsive for at home. Blood sugar in the 40s with EMS s/p glucagon. On arrival to ER pt hypothermic and agonally breathing. Pt became unresponsive with loss of pulse; ACLS initiated. PEA arrest with ROSC after approximately 5 min s/p Epi x2.  Pt intubated during CODE BLUE. Per son pt on the day prior to presentation reported low blood sugar reads on his glucometer in the range of 80s. Son believes pt continued to take his insulin and oral diabetic regimen. Son states that pt was eating but may have been eating less in the last few days. Later on pt noted to have tonic-clonic Sz.     PEA arrest  s/p Epi x 2. ROSC achieved  Takotsubo cardiomyopathy found on Echo   stable  12/13--cardio eval appreciated, further management can be done as outpatient     Acute hypoxemic respiratory failure now chronic  s/p intubation , failed extubation, now trach to vent  c/w vent managment  pulm consulted  daily SBT ;12/13  SBT done, tolerated for 7 hours, placed back on full support due to frequent apnea episodes  12/12 trach sutures removed by surgery     LUE VTE with cellulitis /infected left hand hematoma s/p bedside debridement 12/13   LUE edema and wound of left hand ; good radial pulse   --LUE venous duplex : occlusive thrombus in the left mid subclavian vein with partial   slow flow detected in the lateral subclavian vein.  --LUE arterial Duplex neg   --started heparin drip,  --vascular following   --12/13 Hand surgery consult appreciated, s/p bedside debridement. Dr. Zuñiga would like patient to be on Ancef (d/c keflex)   --ID consult appreciated  --follow cultures , for antibiotic management     New onset Seizure likely due to anoxic brain injury  discussed with Dr. Loo, patient will be only on Keppra 1500mg bid   off vimpat and Depakote   monitor     ELMER 2 to ATN; resolved    Shock liver.  due to PEA arrest. resolved    DM2   A1c 8.8%  continue with current management   FS goal 140-180   continue with PEG feeds     Hypophosphatemia --repleted     unstageable sacral pressure wound   12/13 PT wound consult , vascular consult for possible debridement     Normocytic anemia   no e/o bleeding or bruising   H/H stable   low normal Vit B12 and folic acid --supplement       Preventative measures   heparin gtt --dvt ppx  fall, aspiration  precautions   HOBE     Dispo: patient is undocumented, will need PRUCOL

## 2022-12-15 LAB
-  AMPICILLIN: SIGNIFICANT CHANGE UP
-  TETRACYCLINE: SIGNIFICANT CHANGE UP
-  VANCOMYCIN: SIGNIFICANT CHANGE UP
APTT BLD: 57.9 SEC — HIGH (ref 27.5–35.5)
GLUCOSE BLDC GLUCOMTR-MCNC: 128 MG/DL — HIGH (ref 70–99)
GLUCOSE BLDC GLUCOMTR-MCNC: 137 MG/DL — HIGH (ref 70–99)
GLUCOSE BLDC GLUCOMTR-MCNC: 158 MG/DL — HIGH (ref 70–99)
GLUCOSE BLDC GLUCOMTR-MCNC: 161 MG/DL — HIGH (ref 70–99)
GLUCOSE BLDC GLUCOMTR-MCNC: 184 MG/DL — HIGH (ref 70–99)
HCT VFR BLD CALC: 28.6 % — LOW (ref 39–50)
HGB BLD-MCNC: 8.8 G/DL — LOW (ref 13–17)
MCHC RBC-ENTMCNC: 30.3 PG — SIGNIFICANT CHANGE UP (ref 27–34)
MCHC RBC-ENTMCNC: 30.8 G/DL — LOW (ref 32–36)
MCV RBC AUTO: 98.6 FL — SIGNIFICANT CHANGE UP (ref 80–100)
METHOD TYPE: SIGNIFICANT CHANGE UP
NRBC # BLD: 0 /100 WBCS — SIGNIFICANT CHANGE UP (ref 0–0)
PLATELET # BLD AUTO: 187 K/UL — SIGNIFICANT CHANGE UP (ref 150–400)
RBC # BLD: 2.9 M/UL — LOW (ref 4.2–5.8)
RBC # FLD: 14.5 % — SIGNIFICANT CHANGE UP (ref 10.3–14.5)
WBC # BLD: 6.98 K/UL — SIGNIFICANT CHANGE UP (ref 3.8–10.5)
WBC # FLD AUTO: 6.98 K/UL — SIGNIFICANT CHANGE UP (ref 3.8–10.5)

## 2022-12-15 PROCEDURE — 99232 SBSQ HOSP IP/OBS MODERATE 35: CPT

## 2022-12-15 PROCEDURE — 99233 SBSQ HOSP IP/OBS HIGH 50: CPT

## 2022-12-15 RX ORDER — ENOXAPARIN SODIUM 100 MG/ML
80 INJECTION SUBCUTANEOUS EVERY 12 HOURS
Refills: 0 | Status: DISCONTINUED | OUTPATIENT
Start: 2022-12-15 | End: 2023-01-03

## 2022-12-15 RX ORDER — VANCOMYCIN HCL 1 G
1250 VIAL (EA) INTRAVENOUS EVERY 12 HOURS
Refills: 0 | Status: COMPLETED | OUTPATIENT
Start: 2022-12-15 | End: 2022-12-19

## 2022-12-15 RX ADMIN — POLYETHYLENE GLYCOL 3350 17 GRAM(S): 17 POWDER, FOR SOLUTION ORAL at 12:17

## 2022-12-15 RX ADMIN — Medication 2: at 12:16

## 2022-12-15 RX ADMIN — LEVETIRACETAM 1500 MILLIGRAM(S): 250 TABLET, FILM COATED ORAL at 05:01

## 2022-12-15 RX ADMIN — Medication 1 APPLICATION(S): at 05:02

## 2022-12-15 RX ADMIN — HEPARIN SODIUM 1300 UNIT(S)/HR: 5000 INJECTION INTRAVENOUS; SUBCUTANEOUS at 02:30

## 2022-12-15 RX ADMIN — ENOXAPARIN SODIUM 80 MILLIGRAM(S): 100 INJECTION SUBCUTANEOUS at 17:36

## 2022-12-15 RX ADMIN — CHLORHEXIDINE GLUCONATE 15 MILLILITER(S): 213 SOLUTION TOPICAL at 05:00

## 2022-12-15 RX ADMIN — LEVETIRACETAM 1500 MILLIGRAM(S): 250 TABLET, FILM COATED ORAL at 17:36

## 2022-12-15 RX ADMIN — Medication 81 MILLIGRAM(S): at 12:17

## 2022-12-15 RX ADMIN — Medication 100 MILLIGRAM(S): at 05:00

## 2022-12-15 RX ADMIN — PREGABALIN 1000 MICROGRAM(S): 225 CAPSULE ORAL at 12:16

## 2022-12-15 RX ADMIN — Medication 1 APPLICATION(S): at 05:00

## 2022-12-15 RX ADMIN — CHLORHEXIDINE GLUCONATE 15 MILLILITER(S): 213 SOLUTION TOPICAL at 17:35

## 2022-12-15 RX ADMIN — INSULIN GLARGINE 20 UNIT(S): 100 INJECTION, SOLUTION SUBCUTANEOUS at 09:54

## 2022-12-15 RX ADMIN — HEPARIN SODIUM 1300 UNIT(S)/HR: 5000 INJECTION INTRAVENOUS; SUBCUTANEOUS at 07:48

## 2022-12-15 RX ADMIN — Medication 1 APPLICATION(S): at 17:36

## 2022-12-15 RX ADMIN — Medication 2: at 05:04

## 2022-12-15 RX ADMIN — Medication 1 APPLICATION(S): at 17:14

## 2022-12-15 RX ADMIN — CHLORHEXIDINE GLUCONATE 1 APPLICATION(S): 213 SOLUTION TOPICAL at 05:00

## 2022-12-15 RX ADMIN — Medication 1 APPLICATION(S): at 17:48

## 2022-12-15 RX ADMIN — Medication 25 MILLIGRAM(S): at 05:01

## 2022-12-15 RX ADMIN — Medication 500 MILLIGRAM(S): at 05:01

## 2022-12-15 RX ADMIN — Medication 500 MILLIGRAM(S): at 21:42

## 2022-12-15 RX ADMIN — HEPARIN SODIUM 1300 UNIT(S)/HR: 5000 INJECTION INTRAVENOUS; SUBCUTANEOUS at 10:47

## 2022-12-15 RX ADMIN — ATORVASTATIN CALCIUM 20 MILLIGRAM(S): 80 TABLET, FILM COATED ORAL at 21:44

## 2022-12-15 RX ADMIN — Medication 1 MILLIGRAM(S): at 12:17

## 2022-12-15 RX ADMIN — Medication 166.67 MILLIGRAM(S): at 16:57

## 2022-12-15 RX ADMIN — Medication 25 MILLIGRAM(S): at 17:36

## 2022-12-15 RX ADMIN — Medication 500 MILLIGRAM(S): at 14:11

## 2022-12-15 NOTE — PROGRESS NOTE ADULT - ASSESSMENT
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 Unknown how long pt unresponsive for at home. Blood sugar in the 40s with EMS s/p glucagon. On arrival to ER pt hypothermic and agonally breathing. Pt became unresponsive with loss of pulse; ACLS initiated. PEA arrest with ROSC after approximately 5 min s/p Epi x2.  Pt intubated during CODE BLUE. Per son pt on the day prior to presentation reported low blood sugar reads on his glucometer in the range of 80s. Son believes pt continued to take his insulin and oral diabetic regimen. Son states that pt was eating but may have been eating less in the last few days. Later on pt noted to have tonic-clonic Sz.     PEA arrest  s/p Epi x 2. ROSC achieved  Takotsubo cardiomyopathy found on Echo   stable  12/13--cardio eval appreciated, further management can be done as outpatient     Acute hypoxemic respiratory failure now chronic  s/p intubation , failed extubation, now trach to vent  c/w vent managment  pulm consulted  daily SBT ;12/13  SBT done, tolerated for 7 hours, placed back on full support due to frequent apnea episodes  12/12 trach sutures removed by surgery     LUE VTE with cellulitis /infected left hand hematoma s/p bedside debridement 12/13   LUE edema and wound of left hand ; good radial pulse   --LUE venous duplex : occlusive thrombus in the left mid subclavian vein with partial   slow flow detected in the lateral subclavian vein.  --LUE arterial Duplex neg   --started heparin drip,  --vascular following   --12/13 Hand surgery consult appreciated, s/p bedside debridement. Dr. Zuñiga would like patient to be on Ancef (d/c keflex)   --ID consult appreciated  --follow cultures , for antibiotic management -- growing enterococcus     New onset Seizure likely due to anoxic brain injury  discussed with Dr. Loo, patient will be only on Keppra 1500mg bid   off vimpat and Depakote   monitor     ELMER 2 to ATN; resolved    Shock liver.  due to PEA arrest. resolved    DM2   A1c 8.8%  continue with current management   FS goal 140-180   continue with PEG feeds     Hypophosphatemia --repleted     unstageable sacral pressure wound   12/13 PT wound consult , vascular consult for possible debridement     Normocytic anemia   no e/o bleeding or bruising   H/H stable   low normal Vit B12 and folic acid --supplement       Preventative measures   heparin gtt --dvt ppx  fall, aspiration  precautions   HOBE     Dispo: patient is undocumented, will need PRUCOL

## 2022-12-15 NOTE — PROGRESS NOTE ADULT - ASSESSMENT
REVIEW OF SYMPTOMS      Able to give (reliable) ROS  NO     PHYSICAL EXAM    HEENT Unremarkable  atraumatic   RESP Fair air entry EXP prolonged    Harsh breath sound Resp distres mild   CARDIAC S1 S2 No S3     NO JVD    ABDOMEN SOFT BS PRESENT NOT DISTENDED No hepatosplenomegaly   PEDAL EDEMA present No calf tenderness  NO rash       GENERAL DATA .   GOC.  12/11/2022 full code       ALLGY.  nka                            WT.   ..  12/2/2022 86  BMI.   ..    12/2/2022 29                          ICU STAY.   .. 11/29-12/9  COVID.   .. 12/2/2022 scv2 (-)   ..  11/20/2022 scv2 (-)   BEST PRACTICE ISSUES.    HOB ELEVATN. Yes  DVT PPLX.   12/15/2022 lvnx 80.2 ( l sc thrombus)    ALEJO PPLX.   ..      INFN PPLX.   ..  11/25 chlorhex .12%   .. 11/14 chlorhex 2%   SP SW ANTWAN.         DIET.    ..  12/8 vital 1.2 1200 gt   IV fl.  ..            PROCEDURES.  .. 12/5/2022 trach  .. 12/5/2022 peg       ABGS.  12/3/2022 ac 14/450/.3/5 750/46/75      VS/ PO/IO/ VENT/ DRIPS.   12/15/2022 99f 84 130/70   12/15/2022 tc 99%     PATIENT PRESENTATION.  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 Unknown how long pt unresponsive for at home. Blood sugar in the 40s with EMS s/p glucagon. On arrival to ER pt hypothermic and agonally breathing. Pt became unresponsive with loss of pulse; ACLS initiated. PEA arrest with ROSC after approximately 5 min s/p Epi x2.  Pt intubated during CODE BLUE. Per son pt on the day prior to presentation reported low blood sugar reads on his glucometer in the range of 80s. Son believes pt continued to take his insulin and oral diabetic regimen. Son states that pt was eating but may have been eating less in the last few days. Later on pt noted to have tonic-clonic Sz. ICU dx: 1) Hypoglycemia 2) PEA arrest 3) Takotsubo cardiomyopathy found on Echo 4) New onset Sz either 2 to hypoglycemia vs anoxic injury 5) ELMER 2 to ATN 6) Shock liver. Pt with improvement in MS. s/p trach/PEG on 12/5  Pt downgraded to floor  Pulm consulted 12/11/2022         ASSESSMENT RECOMMENDATIONS..  Sp  cac 11/14/2022   .. Patient is interactive and answers questions appropriately as dw son on 12/13/2022   VTE.  .. 12/12/2022 Pt found to have l subclav dvt    12/12/2022 iv ufh startd   .. 12/15/2022 changed to lvnx   Trach 12/5   .. trach care   vent management.  .. vent bundle dsbt dsv target pplat 30 (-) PO2 60 (+) pH 730 (+)   weaming.  .. 12/13-12/14/2022 tolerated cpap 12/13-12/14/20 rsbi 78-94  .. 12/15/2022 tolerated cpap 4 h   Infection.  ..  Monitor for vap    cellulitis hand   .. 12/12-12/15/2022  5 d course cephalexin for cellulitis  .. 12/15/2022 vanco [id number] Dr VICTOR    CAD.  .. Monitor   CHF.   .. 11/14/2022 echo mod decr segmental lvsf apical lateral apiccal ant segment are abn takotsubo cmpthy pasp 42 .  .. monitor for chf   anemia.  .. Monitor  Target Hb 7 (+)   DECUB.  .. 12/2 collagenase   SEIZURES.  .. sz meds as guided by neuro   NONVIOLENT NONSELF DESTRUCTIVE LEVEL 1   .. 12/13/2022       TIME SPENT   Over 25 minutes aggregate care time spent on encounter; activities included   direct patient care, counseling and/or coordinating care reviewing notes, lab data/ imaging , discussion with multidisciplinary team/ patient  /family and explaining in detail risks, benefits, alternatives  of the recommendations     MARQUISE TAYLRO 72 m 11/14/2022 1948 Dr Leela Dowling

## 2022-12-15 NOTE — PROGRESS NOTE ADULT - SUBJECTIVE AND OBJECTIVE BOX
BRANDON CAMARILLO  MRN-[id number]    Follow Up:  hand abscess s/p I&D, Low grade temperatures     Interval History: the pt was seen and examined earlier, no distress, awake, with tracheostomy, pt's son is present. The pt is afebrile today thus far, no leukocytosis.     PAST MEDICAL & SURGICAL HISTORY:  HTN (hypertension)      HLD (hyperlipidemia)      Diabetes mellitus      Lacunar infarction      BPH (benign prostatic hyperplasia)      CHF (congestive heart failure)      Chronic obstructive pulmonary disease, unspecified COPD type      S/P CABG (coronary artery bypass graft)  2014          ROS:    [x ] Unobtainable because: pt with tracheostomy  [ ] All other systems negative    Constitutional: no fever, no chills  Head: no trauma  Eyes: no vision changes, no eye pain  ENT:  no sore throat, no rhinorrhea  Cardiovascular:  no chest pain, no palpitation  Respiratory:  no SOB, no cough  GI:  no abd pain, no vomiting, no diarrhea  urinary: no dysuria, no hematuria, no flank pain  musculoskeletal:  no joint pain, no joint swelling  skin:  no rash  neurology:  no headache, no seizure, no change in mental status  psych: no anxiety, no depression         Allergies  No Known Allergies        ANTIMICROBIALS:  vancomycin  IVPB 1250 every 12 hours      OTHER MEDS:  acetaminophen     Tablet .. 650 milliGRAM(s) Oral every 6 hours PRN  aluminum hydroxide/magnesium hydroxide/simethicone Suspension 30 milliLiter(s) Oral every 4 hours PRN  aspirin  chewable 81 milliGRAM(s) Oral daily  atorvastatin 20 milliGRAM(s) Oral at bedtime  bacitracin   Ointment 1 Application(s) Topical two times a day  chlorhexidine 0.12% Liquid 15 milliLiter(s) Oral Mucosa every 12 hours  chlorhexidine 2% Cloths 1 Application(s) Topical <User Schedule>  collagenase Ointment 1 Application(s) Topical two times a day  cyanocobalamin 1000 MICROGram(s) Oral daily  folic acid 1 milliGRAM(s) Oral daily  heparin   Injectable 7000 Unit(s) IV Push every 6 hours PRN  heparin   Injectable 3500 Unit(s) IV Push every 6 hours PRN  heparin  Infusion.  Unit(s)/Hr IV Continuous <Continuous>  insulin glargine Injectable (LANTUS) 20 Unit(s) SubCutaneous every morning  insulin lispro (ADMELOG) corrective regimen sliding scale   SubCutaneous every 6 hours  levETIRAcetam 1500 milliGRAM(s) Oral two times a day  metoprolol tartrate 25 milliGRAM(s) Oral two times a day  polyethylene glycol 3350 17 Gram(s) Oral daily  senna 2 Tablet(s) Oral at bedtime  silver sulfADIAZINE 1% Cream 1 Application(s) Topical two times a day  traMADol 25 milliGRAM(s) Oral every 8 hours PRN  valproic  acid Syrup 500 milliGRAM(s) Enteral Tube every 8 hours      Vital Signs Last 24 Hrs  T(C): 36.9 (15 Dec 2022 10:50), Max: 38 (14 Dec 2022 16:17)  T(F): 98.5 (15 Dec 2022 10:50), Max: 100.4 (14 Dec 2022 16:17)  HR: 83 (15 Dec 2022 13:19) (65 - 100)  BP: 144/80 (15 Dec 2022 10:50) (129/69 - 150/77)  BP(mean): --  RR: 18 (15 Dec 2022 10:50) (17 - 19)  SpO2: 99% (15 Dec 2022 13:19) (96% - 100%)    Parameters below as of 15 Dec 2022 10:50  Patient On (Oxygen Delivery Method): trach-vent        Physical Exam:  General: Nontoxic-appearing Male in no acute distress.  HEENT: AT/NC.. Anicteric. Conjunctiva pink and moist. Oropharynx unable due to patient compliance.   Neck: Not rigid. No sense of mass. Trach C/D/I  Nodes: None palpable.  Lungs: Diminished BS Bilaterally   Heart: Regular rate and rhythm.   Abdomen: Bowel sounds present and normoactive. Soft. Nondistended. Nontender. No sense of mass. No organomegaly. PEG in place c/d/i  Extremities: No cyanosis or clubbing. LUE swelling is better, left hand wound with no significant erythema or swelling of surrounding tissues, some bloody drainage is present, no pus, no malodor   Skin: Warm. Dry. Good turgor. No rash. No vasculitic stigmata.  Psychiatric: Unable to assess     WBC Count: 6.98 K/uL (12-15 @ 08:33)  WBC Count: 7.81 K/uL (12-14 @ 10:15)  WBC Count: 7.44 K/uL (12-13 @ 04:02)  WBC Count: 7.96 K/uL (12-12 @ 17:45)  WBC Count: 7.96 K/uL (12-12 @ 06:35)  WBC Count: 9.12 K/uL (12-10 @ 06:38)  WBC Count: 9.46 K/uL (12-09 @ 16:30)                        8.8    6.98  )-----------( 187      ( 15 Dec 2022 08:33 )             28.6     Creatinine Trend: 0.62<--, 0.59<--, 0.60<--, 0.60<--, 0.69<--, 0.79<--      MICROBIOLOGY:  v  .Abscess left hand  12-13-22   Moderate Enterococcus faecalis  --  --      Clean Catch Clean Catch (Midstream)  11-26-22   >100,000 CFU/ml Escherichia coli  --  Escherichia coli      ET Tube ET Tube  11-25-22   Normal Respiratory Larissa present  --    Rare polymorphonuclear leukocytes per low power field  Rare Squamous epithelial cells per low power field  Few Gram Negative Rods per oil power field      .Blood Blood-Peripheral  11-25-22   No Growth Final  --  --      .Blood Blood  11-19-22   No Growth Final  --  --      .Blood Blood  11-18-22   No Growth Final  --  --      ET Tube ET Tube  11-18-22   Few Aspergillus fumigatus complex  Normal Respiratory Larissa present  --    Few polymorphonuclear leukocytes per low power field  Few Squamous epithelial cells per low power field  Moderate Yeast per oil power field  Few Gram Positive Cocci in Clusters per oil power field    Ferritin, Serum: 536 (12-13)    SARS-CoV-2: NotDetec (03 Dec 2022 09:45)  SARS-CoV-2: NotDetec (02 Dec 2022 01:55)  SARS-CoV-2: NotDetec (30 Nov 2022 02:30)  SARS-CoV-2: NotDetec (20 Nov 2022 09:10)    RADIOLOGY:

## 2022-12-15 NOTE — PROGRESS NOTE ADULT - SUBJECTIVE AND OBJECTIVE BOX
Patient is a 72y old  Male who presents with a chief complaint of s/p cardiac arrest, hypoglycemia (15 Dec 2022 09:40)    INTERVAL HPI/OVERNIGHT EVENTS: no events     MEDICATIONS  (STANDING):  aspirin  chewable 81 milliGRAM(s) Oral daily  atorvastatin 20 milliGRAM(s) Oral at bedtime  bacitracin   Ointment 1 Application(s) Topical two times a day  ceFAZolin   IVPB 1000 milliGRAM(s) IV Intermittent every 8 hours  chlorhexidine 0.12% Liquid 15 milliLiter(s) Oral Mucosa every 12 hours  chlorhexidine 2% Cloths 1 Application(s) Topical <User Schedule>  collagenase Ointment 1 Application(s) Topical two times a day  cyanocobalamin 1000 MICROGram(s) Oral daily  folic acid 1 milliGRAM(s) Oral daily  heparin  Infusion.  Unit(s)/Hr (16 mL/Hr) IV Continuous <Continuous>  insulin glargine Injectable (LANTUS) 20 Unit(s) SubCutaneous every morning  insulin lispro (ADMELOG) corrective regimen sliding scale   SubCutaneous every 6 hours  levETIRAcetam 1500 milliGRAM(s) Oral two times a day  metoprolol tartrate 25 milliGRAM(s) Oral two times a day  polyethylene glycol 3350 17 Gram(s) Oral daily  senna 2 Tablet(s) Oral at bedtime  silver sulfADIAZINE 1% Cream 1 Application(s) Topical two times a day  valproic  acid Syrup 500 milliGRAM(s) Enteral Tube every 8 hours    MEDICATIONS  (PRN):  acetaminophen     Tablet .. 650 milliGRAM(s) Oral every 6 hours PRN Temp greater or equal to 38C (100.4F), Mild Pain (1 - 3)  aluminum hydroxide/magnesium hydroxide/simethicone Suspension 30 milliLiter(s) Oral every 4 hours PRN Dyspepsia  heparin   Injectable 7000 Unit(s) IV Push every 6 hours PRN For aPTT less than 40  heparin   Injectable 3500 Unit(s) IV Push every 6 hours PRN For aPTT between 40 - 57  traMADol 25 milliGRAM(s) Oral every 8 hours PRN Severe Pain (7 - 10)    Allergies    No Known Allergies    Intolerances      REVIEW OF SYSTEMS:  All other systems reviewed and are negative    Vital Signs Last 24 Hrs  T(C): 36.9 (15 Dec 2022 10:50), Max: 38 (14 Dec 2022 16:17)  T(F): 98.5 (15 Dec 2022 10:50), Max: 100.4 (14 Dec 2022 16:17)  HR: 65 (15 Dec 2022 10:50) (65 - 100)  BP: 144/80 (15 Dec 2022 10:50) (129/69 - 150/77)  BP(mean): --  RR: 18 (15 Dec 2022 10:50) (17 - 19)  SpO2: 100% (15 Dec 2022 10:50) (96% - 100%)    Parameters below as of 15 Dec 2022 10:50  Patient On (Oxygen Delivery Method): trach-vent      Daily     Daily   I&O's Summary    CAPILLARY BLOOD GLUCOSE      POCT Blood Glucose.: 184 mg/dL (15 Dec 2022 11:30)  POCT Blood Glucose.: 158 mg/dL (15 Dec 2022 09:52)  POCT Blood Glucose.: 161 mg/dL (15 Dec 2022 05:02)  POCT Blood Glucose.: 159 mg/dL (14 Dec 2022 23:44)  POCT Blood Glucose.: 139 mg/dL (14 Dec 2022 21:11)  POCT Blood Glucose.: 142 mg/dL (14 Dec 2022 16:50)    PHYSICAL EXAM:  GENERAL: NAD,  on trach to vent   HEAD:  Atraumatic, Normocephalic  EYES: EOMI, PERRLA, conjunctiva and sclera clear  ENMT: No tonsillar erythema, exudates, or enlargement; Moist mucous membranes   NECK: Supple, No JVD, +TRACH  NERVOUS SYSTEM:  awake and alert,  moving extremities spontaneously   CHEST/LUNG: CTAB;  No rales, rhonchi, wheezing, or rubs  HEART: Regular rate and rhythm; No murmurs, rubs, or gallops  ABDOMEN: Soft, Nontender, Nondistended; Bowel sounds present; +PEG   EXTREMITIES:   left hand dressing cdi     Labs                          8.8    6.98  )-----------( 187      ( 15 Dec 2022 08:33 )             28.6           PTT - ( 15 Dec 2022 08:33 )  PTT:57.9 sec          Culture - Abscess with Gram Stain (collected 13 Dec 2022 13:40)  Source: .Abscess left hand  Preliminary Report (14 Dec 2022 20:22):    Moderate Enterococcus faecalis                DVT prophylaxis: > Lovenox 40mg SQ daily  > Heparin   > SCD's

## 2022-12-15 NOTE — PROGRESS NOTE ADULT - ASSESSMENT
Status post incision and drainage of the left hand.  Change at this juncture appear to be mostly related to the DVT more than anything else I expect.  Unclear if the hematoma drained was secondarily infected, or not.  Culture is pending.  No leukocytosis.  He has had intermittent low-grade fevers through the beginning of this month, with higher grade fevers before that.    12/14: low grade temp last night and today around 16:00, no leukocytosis, left hand s/p I&D - culture is pending, cefazolin IV continued.   12/15: no fevers today thus far, no wbc, hand wound culture grew enterococcus, changing abx to IV vancomycin. Pt's wound with no pus, no malodor, seems not tender, no significant swelling or erythema of surrounding tissues, normal temperature. Surveillance BCs x 2 were collected.     Suggestions:  stop cefazolin  start IV vancomycin - order is in  vancomycin level prior fourth dose  monitoring vancomycin levels is required  follow hand wound culture - grew enterococcus  awaiting for surveillance BCs x 2  trend pt's temperatures  monitor cbc  monitor pt's respiratory status    Discussed with Dr. Chambers   Discussed with Dr. Freeman   Discussed with pt's son

## 2022-12-15 NOTE — PROGRESS NOTE ADULT - SUBJECTIVE AND OBJECTIVE BOX
BRANDON CAMARILLO    [address] [address]    Allergies    No Known Allergies    Intolerances        PAST MEDICAL & SURGICAL HISTORY:  HTN (hypertension)      HLD (hyperlipidemia)      Diabetes mellitus      Lacunar infarction      BPH (benign prostatic hyperplasia)      CHF (congestive heart failure)      Chronic obstructive pulmonary disease, unspecified COPD type      S/P CABG (coronary artery bypass graft)  2014          FAMILY HISTORY:      Home Medications:  aspirin 81 mg oral delayed release tablet: 1 tab(s) orally once a day (12 Jun 2020 17:35)  Liquifilm Tears preserved ophthalmic solution: 1 drop(s) to each affected eye 2 times a day (12 Jun 2020 17:35)      MEDICATIONS  (STANDING):  aspirin  chewable 81 milliGRAM(s) Oral daily  atorvastatin 20 milliGRAM(s) Oral at bedtime  bacitracin   Ointment 1 Application(s) Topical two times a day  ceFAZolin   IVPB 1000 milliGRAM(s) IV Intermittent every 8 hours  chlorhexidine 0.12% Liquid 15 milliLiter(s) Oral Mucosa every 12 hours  chlorhexidine 2% Cloths 1 Application(s) Topical <User Schedule>  collagenase Ointment 1 Application(s) Topical two times a day  cyanocobalamin 1000 MICROGram(s) Oral daily  folic acid 1 milliGRAM(s) Oral daily  heparin  Infusion.  Unit(s)/Hr (16 mL/Hr) IV Continuous <Continuous>  insulin glargine Injectable (LANTUS) 20 Unit(s) SubCutaneous every morning  insulin lispro (ADMELOG) corrective regimen sliding scale   SubCutaneous every 6 hours  levETIRAcetam 1500 milliGRAM(s) Oral two times a day  metoprolol tartrate 25 milliGRAM(s) Oral two times a day  polyethylene glycol 3350 17 Gram(s) Oral daily  senna 2 Tablet(s) Oral at bedtime  silver sulfADIAZINE 1% Cream 1 Application(s) Topical two times a day  valproic  acid Syrup 500 milliGRAM(s) Enteral Tube every 8 hours    MEDICATIONS  (PRN):  acetaminophen     Tablet .. 650 milliGRAM(s) Oral every 6 hours PRN Temp greater or equal to 38C (100.4F), Mild Pain (1 - 3)  aluminum hydroxide/magnesium hydroxide/simethicone Suspension 30 milliLiter(s) Oral every 4 hours PRN Dyspepsia  heparin   Injectable 7000 Unit(s) IV Push every 6 hours PRN For aPTT less than 40  heparin   Injectable 3500 Unit(s) IV Push every 6 hours PRN For aPTT between 40 - 57  traMADol 25 milliGRAM(s) Oral every 8 hours PRN Severe Pain (7 - 10)      Diet, NPO with Tube Feed:   Tube Feeding Modality: Gastrostomy  Vital AF 1.2  Total Volume for 24 Hours (mL): 1200  Continuous  Starting Tube Feed Rate mL per Hour: 45  Increase Tube Feed Rate by (mL): 5     Every 12 hours  Until Goal Tube Feed Rate (mL per Hour): 50  Tube Feed Duration (in Hours): 24  Tube Feed Start Time: 15:00 (12-08-22 @ 14:46) [Active]          Vital Signs Last 24 Hrs  T(C): 36.6 (15 Dec 2022 04:40), Max: 38 (14 Dec 2022 16:17)  T(F): 97.8 (15 Dec 2022 04:40), Max: 100.4 (14 Dec 2022 16:17)  HR: 90 (15 Dec 2022 05:50) (85 - 100)  BP: 129/80 (15 Dec 2022 04:40) (129/69 - 150/77)  BP(mean): --  RR: 19 (15 Dec 2022 04:40) (17 - 19)  SpO2: 99% (15 Dec 2022 05:50) (96% - 100%)    Parameters below as of 15 Dec 2022 04:40  Patient On (Oxygen Delivery Method): ventilator            Mode: AC/ CMV (Assist Control/ Continuous Mandatory Ventilation), RR (machine): 14, TV (machine): 450, FiO2: 30, PEEP: 5, ITime: 0.9, MAP: 10, PIP: 45      LABS:                        8.8    6.98  )-----------( 187      ( 15 Dec 2022 08:33 )             28.6           PTT - ( 15 Dec 2022 08:33 )  PTT:57.9 sec          WBC:  WBC Count: 6.98 K/uL (12-15 @ 08:33)  WBC Count: 7.81 K/uL (12-14 @ 10:15)  WBC Count: 7.44 K/uL (12-13 @ 04:02)  WBC Count: 7.96 K/uL (12-12 @ 17:45)  WBC Count: 7.96 K/uL (12-12 @ 06:35)      MICROBIOLOGY:  RECENT CULTURES:  12-13 .Abscess left hand XXXX XXXX   Moderate Enterococcus faecalis                PTT - ( 15 Dec 2022 08:33 )  PTT:57.9 sec    Sodium:  Sodium, Serum: 139 mmol/L (12-13 @ 04:02)  Sodium, Serum: 137 mmol/L (12-12 @ 06:35)      0.62 mg/dL 12-13 @ 04:02  0.59 mg/dL 12-12 @ 06:35      Hemoglobin:  Hemoglobin: 8.8 g/dL (12-15 @ 08:33)  Hemoglobin: 9.4 g/dL (12-14 @ 10:15)  Hemoglobin: 9.7 g/dL (12-13 @ 04:02)  Hemoglobin: 9.5 g/dL (12-12 @ 17:45)  Hemoglobin: 9.9 g/dL (12-12 @ 06:35)      Platelets: Platelet Count - Automated: 187 K/uL (12-15 @ 08:33)  Platelet Count - Automated: 210 K/uL (12-14 @ 10:15)  Platelet Count - Automated: 245 K/uL (12-13 @ 04:02)  Platelet Count - Automated: 248 K/uL (12-12 @ 17:45)  Platelet Count - Automated: 256 K/uL (12-12 @ 06:35)              RADIOLOGY & ADDITIONAL STUDIES:      MICROBIOLOGY:  RECENT CULTURES:  12-13 .Abscess left hand XXXX XXXX   Moderate Enterococcus faecalis

## 2022-12-15 NOTE — CHART NOTE - NSCHARTNOTEFT_GEN_A_CORE
Assessment:  Pt seen in 2C unit, awake, appeared alert, G-Tube feeding infusing c Vital AF 1.2 @ 50 ml/hr, pt's son was @ bedside.  Pt adm c dx of hypoglycemia, cardiac arrest, hypothermia, c acute respiratory failure, now chronic, seizures, s/p ELMER, shock liver, E. Coli, UTI, Pt s/p trach, PEG, c LUE VTE c cellulitis, infected left hand hematoma, s/p bedside debridement 12/13, new onset seizure, DM, normocytic anemia.  PMH include COPD, CHF, DM( was on Jardiance, Metformin, Victoza pen, Insulin Lispro 10 units, 2 x day, and Insulin Lantus 20 units per medication list provided by son on 11/16), HTN, HLD, CAD, CABG, CKD, CVA, BPH.    Factors impacting intake: [ ] none [ ] nausea  [ ] vomiting [ ] diarrhea [ ] constipation  [ ]chewing problems [ ] swallowing issues  [ x] other: tolerating G-Tube feeding    Diet Prescription: Diet, NPO with Tube Feed:   Tube Feeding Modality: Gastrostomy  Vital AF 1.2  Total Volume for 24 Hours (mL): 1200  Continuous  Starting Tube Feed Rate {mL per Hour}: 45  Increase Tube Feed Rate by (mL): 5     Every 12 hours  Until Goal Tube Feed Rate (mL per Hour): 50  Tube Feed Duration (in Hours): 24  Tube Feed Start Time: 15:00 (12-08-22 @ 14:46)    Intake: Vital AF 1.2 @ 50 ml/hr =1200 ml, 1440 calories, 90 grams protein, 973 ml free water, 101% RDIs     Current Weight: 12/14, 79.8 kg, 12/08, 82.1 kg, 11/14, 82.2 kg, c wt. loss of 2.4 kg   % Weight Change: 2.9%  12/14, 1+generalized edema, 3+left arm edema noted  I/O: ?    Pertinent Medications: MEDICATIONS  (STANDING):  aspirin  chewable 81 milliGRAM(s) Oral daily  atorvastatin 20 milliGRAM(s) Oral at bedtime  bacitracin   Ointment 1 Application(s) Topical two times a day  ceFAZolin   IVPB 1000 milliGRAM(s) IV Intermittent every 8 hours  chlorhexidine 0.12% Liquid 15 milliLiter(s) Oral Mucosa every 12 hours  chlorhexidine 2% Cloths 1 Application(s) Topical <User Schedule>  collagenase Ointment 1 Application(s) Topical two times a day  cyanocobalamin 1000 MICROGram(s) Oral daily  folic acid 1 milliGRAM(s) Oral daily  heparin  Infusion.  Unit(s)/Hr (16 mL/Hr) IV Continuous <Continuous>  insulin glargine Injectable (LANTUS) 20 Unit(s) SubCutaneous every morning  insulin lispro (ADMELOG) corrective regimen sliding scale   SubCutaneous every 6 hours  levETIRAcetam 1500 milliGRAM(s) Oral two times a day  metoprolol tartrate 25 milliGRAM(s) Oral two times a day  polyethylene glycol 3350 17 Gram(s) Oral daily  senna 2 Tablet(s) Oral at bedtime  silver sulfADIAZINE 1% Cream 1 Application(s) Topical two times a day  valproic  acid Syrup 500 milliGRAM(s) Enteral Tube every 8 hours    MEDICATIONS  (PRN):  acetaminophen     Tablet .. 650 milliGRAM(s) Oral every 6 hours PRN Temp greater or equal to 38C (100.4F), Mild Pain (1 - 3)  aluminum hydroxide/magnesium hydroxide/simethicone Suspension 30 milliLiter(s) Oral every 4 hours PRN Dyspepsia  heparin   Injectable 7000 Unit(s) IV Push every 6 hours PRN For aPTT less than 40  heparin   Injectable 3500 Unit(s) IV Push every 6 hours PRN For aPTT between 40 - 57  traMADol 25 milliGRAM(s) Oral every 8 hours PRN Severe Pain (7 - 10)    Pertinent Labs: 12-13 Na139 mmol/L Glu 116 mg/dL<H> K+ 4.3 mmol/L Cr  0.62 mg/dL BUN 20 mg/dL 12-13 Phos 2.8 mg/dL 12-13 Alb 1.8 g/dL<L>12-13 ALT 17 U/L AST 16 U/L Alkaline Phosphatase 96 U/L  11/14, A1C=8.8% <H>   CAPILLARY BLOOD GLUCOSE      POCT Blood Glucose.: 184 mg/dL (15 Dec 2022 11:30)  POCT Blood Glucose.: 158 mg/dL (15 Dec 2022 09:52)  POCT Blood Glucose.: 161 mg/dL (15 Dec 2022 05:02)  POCT Blood Glucose.: 159 mg/dL (14 Dec 2022 23:44)  POCT Blood Glucose.: 139 mg/dL (14 Dec 2022 21:11)  POCT Blood Glucose.: 142 mg/dL (14 Dec 2022 16:50)  POCT Blood Glucose.: 172 mg/dL (14 Dec 2022 12:04)    Skin: 12/14, WDL except ecchymotic, pressure injury, IAD; perineum  12/13, skin tear; left hand; wound  12/04, right buttock; skin tear  Pressure Ulcer x 3  1. 12/02; left ear; healed  2. 12/13, sacrum; unstageable   3. anterior neck; trach area; unstageable         Estimated Needs:   [ x] no change since previous assessment(11/16 & 11/23)  IBW: 61.6 kg  Estimated Energy Needs 0748-9521(25-30 Kcal/kg IBW)  Estimated Protein Needs: 74-86g protein/day(1.2-1.4 gram pro/kg IBW)  Estimated Fluid Needs: 5736-7755 ml(20-25 ml/kg IBW)  Estimated Fluid Needs Calculated From (ml/kg)	1232  Estimated Fluid Needs Calculated To (ml/kg)	1540  [ ] recalculated:       Previous New Nutrition Diagnosis: (12/08)  [ x] Increased Nutrient Needs; protein-energy     Related to: increased demand for nutrient     As evidenced by: pressure ulcers    Goal: pt to meet >75% protein-energy needs; met     Interventions:   Recommend  [ ] Change Diet To:  [ ] Nutrition Supplement  [ x Nutrition Support; Glucerna 1.2 @ 50 mL/hr, increase as tolerated by 5 ml q 8 hours to goal rate of 60 mL/hr x 24 hrs (1728 oscar, 86 gm pro, 1166 mL free water, 120% RDIs)   [x ] Other: consider swallow evaluation when pt is appropriate    Monitoring and Evaluation:   [ ] PO intake [ x ] Tolerance to diet prescription [ x ] weights [ x ] labs[ x ] follow up per protocol  [ ] other: Assessment:  Pt seen in 2C unit, awake, appeared alert, G-Tube feeding infusing c Vital AF 1.2 @ 50 ml/hr, pt's son was @ bedside.  Pt s/p transfer from ICU 12/09.  Pt adm c dx of hypoglycemia, cardiac arrest, hypothermia, c acute respiratory failure, now chronic, seizures, s/p ELMER, shock liver, E. Coli, UTI, Pt s/p trach, PEG, c LUE VTE c cellulitis, infected left hand hematoma, s/p bedside debridement 12/13, new onset seizure, DM, normocytic anemia.  PMH include COPD, CHF, DM( was on Jardiance, Metformin, Victoza pen, Insulin Lispro 10 units, 2 x day, and Insulin Lantus 20 units per medication list provided by son on 11/16), HTN, HLD, CAD, CABG, CKD, CVA, BPH.    Factors impacting intake: [ ] none [ ] nausea  [ ] vomiting [ ] diarrhea [ ] constipation  [ ]chewing problems [ ] swallowing issues  [ x] other: tolerating G-Tube feeding    Diet Prescription: Diet, NPO with Tube Feed:   Tube Feeding Modality: Gastrostomy  Vital AF 1.2  Total Volume for 24 Hours (mL): 1200  Continuous  Starting Tube Feed Rate {mL per Hour}: 45  Increase Tube Feed Rate by (mL): 5     Every 12 hours  Until Goal Tube Feed Rate (mL per Hour): 50  Tube Feed Duration (in Hours): 24  Tube Feed Start Time: 15:00 (12-08-22 @ 14:46)    Intake: Vital AF 1.2 @ 50 ml/hr =1200 ml, 1440 calories, 90 grams protein, 973 ml free water, 101% RDIs     Current Weight: 12/14, 79.8 kg, 12/08, 82.1 kg, 11/14, 82.2 kg, c wt. loss of 2.4 kg   % Weight Change: 2.9%  12/14, 1+generalized edema, 3+left arm edema noted  I/O: ?    Pertinent Medications: MEDICATIONS  (STANDING):  aspirin  chewable 81 milliGRAM(s) Oral daily  atorvastatin 20 milliGRAM(s) Oral at bedtime  bacitracin   Ointment 1 Application(s) Topical two times a day  ceFAZolin   IVPB 1000 milliGRAM(s) IV Intermittent every 8 hours  chlorhexidine 0.12% Liquid 15 milliLiter(s) Oral Mucosa every 12 hours  chlorhexidine 2% Cloths 1 Application(s) Topical <User Schedule>  collagenase Ointment 1 Application(s) Topical two times a day  cyanocobalamin 1000 MICROGram(s) Oral daily  folic acid 1 milliGRAM(s) Oral daily  heparin  Infusion.  Unit(s)/Hr (16 mL/Hr) IV Continuous <Continuous>  insulin glargine Injectable (LANTUS) 20 Unit(s) SubCutaneous every morning  insulin lispro (ADMELOG) corrective regimen sliding scale   SubCutaneous every 6 hours  levETIRAcetam 1500 milliGRAM(s) Oral two times a day  metoprolol tartrate 25 milliGRAM(s) Oral two times a day  polyethylene glycol 3350 17 Gram(s) Oral daily  senna 2 Tablet(s) Oral at bedtime  silver sulfADIAZINE 1% Cream 1 Application(s) Topical two times a day  valproic  acid Syrup 500 milliGRAM(s) Enteral Tube every 8 hours    MEDICATIONS  (PRN):  acetaminophen     Tablet .. 650 milliGRAM(s) Oral every 6 hours PRN Temp greater or equal to 38C (100.4F), Mild Pain (1 - 3)  aluminum hydroxide/magnesium hydroxide/simethicone Suspension 30 milliLiter(s) Oral every 4 hours PRN Dyspepsia  heparin   Injectable 7000 Unit(s) IV Push every 6 hours PRN For aPTT less than 40  heparin   Injectable 3500 Unit(s) IV Push every 6 hours PRN For aPTT between 40 - 57  traMADol 25 milliGRAM(s) Oral every 8 hours PRN Severe Pain (7 - 10)    Pertinent Labs: 12-13 Na139 mmol/L Glu 116 mg/dL<H> K+ 4.3 mmol/L Cr  0.62 mg/dL BUN 20 mg/dL 12-13 Phos 2.8 mg/dL 12-13 Alb 1.8 g/dL<L>12-13 ALT 17 U/L AST 16 U/L Alkaline Phosphatase 96 U/L  11/14, A1C=8.8% <H>   CAPILLARY BLOOD GLUCOSE      POCT Blood Glucose.: 184 mg/dL (15 Dec 2022 11:30)  POCT Blood Glucose.: 158 mg/dL (15 Dec 2022 09:52)  POCT Blood Glucose.: 161 mg/dL (15 Dec 2022 05:02)  POCT Blood Glucose.: 159 mg/dL (14 Dec 2022 23:44)  POCT Blood Glucose.: 139 mg/dL (14 Dec 2022 21:11)  POCT Blood Glucose.: 142 mg/dL (14 Dec 2022 16:50)  POCT Blood Glucose.: 172 mg/dL (14 Dec 2022 12:04)    Skin: 12/14, WDL except ecchymotic, pressure injury, IAD; perineum  12/13, skin tear; left hand; wound  12/04, right buttock; skin tear  Pressure Ulcer x 3  1. 12/02; left ear; healed  2. 12/13, sacrum; unstageable   3. anterior neck; trach area; unstageable         Estimated Needs:   [ x] no change since previous assessment(11/16 & 11/23)  IBW: 61.6 kg  Estimated Energy Needs 5252-0484(25-30 Kcal/kg IBW)  Estimated Protein Needs: 74-86g protein/day(1.2-1.4 gram pro/kg IBW)  Estimated Fluid Needs: 2816-8929 ml(20-25 ml/kg IBW)  Estimated Fluid Needs Calculated From (ml/kg)	1232  Estimated Fluid Needs Calculated To (ml/kg)	1540  [ ] recalculated:       Previous New Nutrition Diagnosis: (12/08)  [ x] Increased Nutrient Needs; protein-energy     Related to: increased demand for nutrient     As evidenced by: pressure ulcers    Goal: pt to meet >75% protein-energy needs; met     Interventions:   Recommend  [ ] Change Diet To:  [ ] Nutrition Supplement  [ x Nutrition Support; Glucerna 1.2 @ 50 mL/hr, increase as tolerated by 5 ml q 8 hours to goal rate of 60 mL/hr x 24 hrs (1728 oscar, 86 gm pro, 1166 mL free water, 120% RDIs)   [x ] Other: consider swallow evaluation when pt is appropriate    Monitoring and Evaluation:   [ ] PO intake [ x ] Tolerance to diet prescription [ x ] weights [ x ] labs[ x ] follow up per protocol  [ ] other:

## 2022-12-16 LAB
ANION GAP SERPL CALC-SCNC: 4 MMOL/L — LOW (ref 5–17)
APTT BLD: 30.3 SEC — SIGNIFICANT CHANGE UP (ref 27.5–35.5)
BUN SERPL-MCNC: 23 MG/DL — SIGNIFICANT CHANGE UP (ref 7–23)
CALCIUM SERPL-MCNC: 8.9 MG/DL — SIGNIFICANT CHANGE UP (ref 8.5–10.1)
CHLORIDE SERPL-SCNC: 101 MMOL/L — SIGNIFICANT CHANGE UP (ref 96–108)
CO2 SERPL-SCNC: 34 MMOL/L — HIGH (ref 22–31)
CREAT SERPL-MCNC: 0.76 MG/DL — SIGNIFICANT CHANGE UP (ref 0.5–1.3)
EGFR: 94 ML/MIN/1.73M2 — SIGNIFICANT CHANGE UP
GLUCOSE BLDC GLUCOMTR-MCNC: 129 MG/DL — HIGH (ref 70–99)
GLUCOSE BLDC GLUCOMTR-MCNC: 156 MG/DL — HIGH (ref 70–99)
GLUCOSE BLDC GLUCOMTR-MCNC: 192 MG/DL — HIGH (ref 70–99)
GLUCOSE BLDC GLUCOMTR-MCNC: 306 MG/DL — HIGH (ref 70–99)
GLUCOSE BLDC GLUCOMTR-MCNC: 65 MG/DL — LOW (ref 70–99)
GLUCOSE SERPL-MCNC: 167 MG/DL — HIGH (ref 70–99)
HCT VFR BLD CALC: 28.4 % — LOW (ref 39–50)
HGB BLD-MCNC: 8.9 G/DL — LOW (ref 13–17)
MCHC RBC-ENTMCNC: 30.2 PG — SIGNIFICANT CHANGE UP (ref 27–34)
MCHC RBC-ENTMCNC: 31.3 G/DL — LOW (ref 32–36)
MCV RBC AUTO: 96.3 FL — SIGNIFICANT CHANGE UP (ref 80–100)
NRBC # BLD: 0 /100 WBCS — SIGNIFICANT CHANGE UP (ref 0–0)
PLATELET # BLD AUTO: 211 K/UL — SIGNIFICANT CHANGE UP (ref 150–400)
POTASSIUM SERPL-MCNC: 4 MMOL/L — SIGNIFICANT CHANGE UP (ref 3.5–5.3)
POTASSIUM SERPL-SCNC: 4 MMOL/L — SIGNIFICANT CHANGE UP (ref 3.5–5.3)
RBC # BLD: 2.95 M/UL — LOW (ref 4.2–5.8)
RBC # FLD: 14.6 % — HIGH (ref 10.3–14.5)
SODIUM SERPL-SCNC: 139 MMOL/L — SIGNIFICANT CHANGE UP (ref 135–145)
WBC # BLD: 6.17 K/UL — SIGNIFICANT CHANGE UP (ref 3.8–10.5)
WBC # FLD AUTO: 6.17 K/UL — SIGNIFICANT CHANGE UP (ref 3.8–10.5)

## 2022-12-16 PROCEDURE — 99232 SBSQ HOSP IP/OBS MODERATE 35: CPT

## 2022-12-16 PROCEDURE — 99223 1ST HOSP IP/OBS HIGH 75: CPT

## 2022-12-16 PROCEDURE — 99233 SBSQ HOSP IP/OBS HIGH 50: CPT

## 2022-12-16 RX ORDER — PIPERACILLIN AND TAZOBACTAM 4; .5 G/20ML; G/20ML
3.38 INJECTION, POWDER, LYOPHILIZED, FOR SOLUTION INTRAVENOUS EVERY 8 HOURS
Refills: 0 | Status: COMPLETED | OUTPATIENT
Start: 2022-12-16 | End: 2022-12-19

## 2022-12-16 RX ADMIN — Medication 1 APPLICATION(S): at 18:00

## 2022-12-16 RX ADMIN — Medication 650 MILLIGRAM(S): at 01:36

## 2022-12-16 RX ADMIN — Medication 1 APPLICATION(S): at 05:25

## 2022-12-16 RX ADMIN — CHLORHEXIDINE GLUCONATE 1 APPLICATION(S): 213 SOLUTION TOPICAL at 05:25

## 2022-12-16 RX ADMIN — LEVETIRACETAM 1500 MILLIGRAM(S): 250 TABLET, FILM COATED ORAL at 18:00

## 2022-12-16 RX ADMIN — Medication 25 MILLIGRAM(S): at 05:26

## 2022-12-16 RX ADMIN — Medication 2: at 05:33

## 2022-12-16 RX ADMIN — PIPERACILLIN AND TAZOBACTAM 25 GRAM(S): 4; .5 INJECTION, POWDER, LYOPHILIZED, FOR SOLUTION INTRAVENOUS at 14:19

## 2022-12-16 RX ADMIN — PIPERACILLIN AND TAZOBACTAM 25 GRAM(S): 4; .5 INJECTION, POWDER, LYOPHILIZED, FOR SOLUTION INTRAVENOUS at 22:52

## 2022-12-16 RX ADMIN — Medication 1 APPLICATION(S): at 17:59

## 2022-12-16 RX ADMIN — CHLORHEXIDINE GLUCONATE 15 MILLILITER(S): 213 SOLUTION TOPICAL at 17:59

## 2022-12-16 RX ADMIN — Medication 500 MILLIGRAM(S): at 17:59

## 2022-12-16 RX ADMIN — ENOXAPARIN SODIUM 80 MILLIGRAM(S): 100 INJECTION SUBCUTANEOUS at 18:01

## 2022-12-16 RX ADMIN — ATORVASTATIN CALCIUM 20 MILLIGRAM(S): 80 TABLET, FILM COATED ORAL at 22:44

## 2022-12-16 RX ADMIN — Medication 1 MILLIGRAM(S): at 12:19

## 2022-12-16 RX ADMIN — Medication 166.67 MILLIGRAM(S): at 18:03

## 2022-12-16 RX ADMIN — INSULIN GLARGINE 20 UNIT(S): 100 INJECTION, SOLUTION SUBCUTANEOUS at 08:13

## 2022-12-16 RX ADMIN — Medication 500 MILLIGRAM(S): at 05:27

## 2022-12-16 RX ADMIN — ENOXAPARIN SODIUM 80 MILLIGRAM(S): 100 INJECTION SUBCUTANEOUS at 05:26

## 2022-12-16 RX ADMIN — Medication 1 APPLICATION(S): at 05:33

## 2022-12-16 RX ADMIN — Medication 166.67 MILLIGRAM(S): at 05:26

## 2022-12-16 RX ADMIN — CHLORHEXIDINE GLUCONATE 15 MILLILITER(S): 213 SOLUTION TOPICAL at 05:26

## 2022-12-16 RX ADMIN — Medication 500 MILLIGRAM(S): at 22:45

## 2022-12-16 RX ADMIN — Medication 25 MILLIGRAM(S): at 18:01

## 2022-12-16 RX ADMIN — PREGABALIN 1000 MICROGRAM(S): 225 CAPSULE ORAL at 12:19

## 2022-12-16 RX ADMIN — SENNA PLUS 2 TABLET(S): 8.6 TABLET ORAL at 22:44

## 2022-12-16 RX ADMIN — Medication 8: at 18:54

## 2022-12-16 RX ADMIN — Medication 81 MILLIGRAM(S): at 12:24

## 2022-12-16 RX ADMIN — LEVETIRACETAM 1500 MILLIGRAM(S): 250 TABLET, FILM COATED ORAL at 05:27

## 2022-12-16 NOTE — PROGRESS NOTE ADULT - ASSESSMENT
Status post incision and drainage of the left hand.  Change at this juncture appear to be mostly related to the DVT more than anything else I expect.  Unclear if the hematoma drained was secondarily infected, or not.  Culture is pending.  No leukocytosis.  He has had intermittent low-grade fevers through the beginning of this month, with higher grade fevers before that.    12/14: low grade temp last night and today around 16:00, no leukocytosis, left hand s/p I&D - culture is pending, cefazolin IV continued.   12/15: no fevers today thus far, no wbc, hand wound culture grew enterococcus, changing abx to IV vancomycin. Pt's wound with no pus, no malodor, seems not tender, no significant swelling or erythema of surrounding tissues, normal temperature. Surveillance BCs x 2 were collected.   12/16: low grade temp last night, no leukocytosis, cr ok, hand wound continues to improve. Pt's back was assessed with wound care / PT, possibly will need to be debrided, recommended vascular consult. Will add Zosyn to the regimen for now, Vancomycin IV continued, awaiting for BCs.     Suggestions:  continue IV vancomycin - order is in  vancomycin level prior fourth dose - obtain level prior tomorrow's morning dose, discussed with RN, order is in  monitoring vancomycin levels is required  start IV zosyn - order is in  follow hand wound culture - grew enterococcus  awaiting for surveillance BCs x 2  trend pt's temperatures  monitor cbc  monitor pt's respiratory status  vascular consult for possible sacral wound debridement     Discussed with Dr. Chambers   Discussed with Dr. Freeman   Discussed with pt's son   Discussed with RN

## 2022-12-16 NOTE — PROGRESS NOTE ADULT - ASSESSMENT
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 Unknown how long pt unresponsive for at home. Blood sugar in the 40s with EMS s/p glucagon. On arrival to ER pt hypothermic and agonally breathing. Pt became unresponsive with loss of pulse; ACLS initiated. PEA arrest with ROSC after approximately 5 min s/p Epi x2.  Pt intubated during CODE BLUE. Per son pt on the day prior to presentation reported low blood sugar reads on his glucometer in the range of 80s. Son believes pt continued to take his insulin and oral diabetic regimen. Son states that pt was eating but may have been eating less in the last few days. Later on pt noted to have tonic-clonic Sz.     PEA arrest  s/p Epi x 2. ROSC achieved  Takotsubo cardiomyopathy found on Echo   stable  12/13--cardio eval appreciated, further management can be done as outpatient     Acute hypoxemic respiratory failure now chronic  s/p intubation , failed extubation, now trach to vent  c/w vent managment  pulm consulted  daily SBT ;12/13  SBT done, tolerated for 7 hours, placed back on full support due to frequent apnea episodes  12/12 trach sutures removed by surgery         LUE VTE with cellulitis /infected left hand hematoma s/p bedside debridement 12/13   LUE edema and wound of left hand ; good radial pulse   --LUE venous duplex : occlusive thrombus in the left mid subclavian vein with partial   slow flow detected in the lateral subclavian vein.  --LUE arterial Duplex neg   --started heparin drip,  --vascular following   --12/13 Hand surgery consult appreciated, s/p bedside debridement. Dr. Zuñiga would like patient to be on Ancef (d/c keflex)   --ID consult appreciated  --follow cultures , on vancomycin-- growing enterococcus     New onset Seizure likely due to anoxic brain injury  discussed with Dr. Loo, patient will be only on Keppra 1500mg bid   off vimpat and Depakote   monitor     ELMER 2 to ATN; resolved    Shock liver.  due to PEA arrest. resolved    DM2   A1c 8.8%  continue with current management   FS goal 140-180   continue with PEG feeds     Hypophosphatemia --repleted     unstageable sacral pressure wound   12/13 PT wound consult , vascular consult for possible debridement     Normocytic anemia   no e/o bleeding or bruising   H/H stable   low normal Vit B12 and folic acid --supplement       Preventative measures   heparin gtt --dvt ppx  fall, aspiration  precautions   HOBE     Dispo: patient is undocumented, will need PRUCOL

## 2022-12-16 NOTE — PROGRESS NOTE ADULT - ASSESSMENT
REVIEW OF SYMPTOMS      Able to give (reliable) ROS  NO     PHYSICAL EXAM    HEENT Unremarkable  atraumatic   RESP Fair air entry EXP prolonged    Harsh breath sound Resp distres mild   CARDIAC S1 S2 No S3     NO JVD    ABDOMEN SOFT BS PRESENT NOT DISTENDED No hepatosplenomegaly   PEDAL EDEMA present No calf tenderness  NO rash       GENERAL DATA .   GOC.  12/11/2022 full code       ALLGY.  nka                            WT. ..  12/2/2022 86  BMI. ..    12/2/2022 29                          ICU STAY.  .. 11/29-12/9  COVID.   .. 12/2/2022 scv2 (-)   ..  11/20/2022 scv2 (-)   BEST PRACTICE ISSUES.    HOB ELEVATN. Yes  DVT PPLX.   12/15/2022 lvnx 80.2 ( l sc thrombus)    ALEJO PPLX.   ..      INFN PPLX.   ..  11/25 chlorhex .12%   .. 11/14 chlorhex 2%   SP SW ANTWAN.         DIET.    ..  12/8 vital 1.2 1200 gt   IV fl.  ..            PROCEDURES.  .. 12/5/2022 trach  .. 12/5/2022 peg   .. 12/12 r arm midline       ABGS.  12/3/2022 ac 14/450/.3/5 750/46/75      VS/ PO/IO/ VENT/ DRIPS.   12/16/2022 afeb 80 116/70   12/16/2022 12p cpap 5/10/30 svt 379 r 27     PATIENT PRESENTATION.  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 Unknown how long pt unresponsive for at home. Blood sugar in the 40s with EMS s/p glucagon. On arrival to ER pt hypothermic and agonally breathing. Pt became unresponsive with loss of pulse; ACLS initiated. PEA arrest with ROSC after approximately 5 min s/p Epi x2.  Pt intubated during CODE BLUE. Per son pt on the day prior to presentation reported low blood sugar reads on his glucometer in the range of 80s. Son believes pt continued to take his insulin and oral diabetic regimen. Son states that pt was eating but may have been eating less in the last few days. Later on pt noted to have tonic-clonic Sz. ICU dx: 1) Hypoglycemia 2) PEA arrest 3) Takotsubo cardiomyopathy found on Echo 4) New onset Sz either 2 to hypoglycemia vs anoxic injury 5) ELMER 2 to ATN 6) Shock liver. Pt with improvement in MS. s/p trach/PEG on 12/5  Pt downgraded to floor  Pulm consulted 12/11/2022         ASSESSMENT RECOMMENDATIONS..  Sp  cac 11/14/2022   .. Patient is interactive and answers questions appropriately as dw son on 12/13/2022   VTE.  .. 12/12/2022 Pt found to have l subclav dvt    12/12/2022 iv ufh startd   .. 12/15/2022 changed to lvnx   Trach 12/5   .. trach care   vent management.  .. vent bundle dsbt dsv target pplat 30 (-) PO2 60 (+) pH 730 (+)   weaming.  .. 12/13-12/14-12/16/2022 tolerated cpap 12/13-12/14-12/16/20 rsbi 78-94-71  .. 12/15/2022 tolerated cpap 4 h   Infection.  ..  Monitor for vap    cellulitis hand   .. w 12/16/2022 w 6   .. bc 12/15 (-)   .. absc 12/13 mod enterococcus fecalis  staph epi   .. 12/12-12/15/2022  5 d course cephalexin for cellulitis  .. 12/15/2022 vanco 1250x2 Dr VICTOR    .. 12/16/2022 zosyn   CAD.  .. Monitor   CHF.   .. 11/14/2022 echo mod decr segmental lvsf apical lateral apiccal ant segment are abn takotsubo cmpthy pasp 42 .  .. monitor for chf   anemia.  .. Hb 12/16/2022 Hb 8.9   .. Monitor  Target Hb 7 (+)   DECUB.  .. 12/2 collagenase   SEIZURES.  .. sz meds as guided by neuro   NONVIOLENT NONSELF DESTRUCTIVE LEVEL 1   .. 12/13/2022       TIME SPENT   Over 25 minutes aggregate care time spent on encounter; activities included   direct patient care, counseling and/or coordinating care reviewing notes, lab data/ imaging , discussion with multidisciplinary team/ patient  /family and explaining in detail risks, benefits, alternatives  of the recommendations     MARQUISE TAYLOR 72 m 11/14/2022 1948 Dr Leela Dowling

## 2022-12-16 NOTE — PHYSICAL THERAPY INITIAL EVALUATION ADULT - PERTINENT HX OF CURRENT PROBLEM, REHAB EVAL
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 Unknown how long pt unresponsive for at home. Blood sugar in the 40s with EMS s/p glucagon. On arrival to ER pt hypothermic and agonally breathing. Pt became unresponsive with loss of pulse; ACLS initiated. PEA arrest with ROSC after approximately 5 min s/p Epi x2.  Pt intubated during CODE BLUE. Per son pt on the day prior to presentation reported low blood sugar reads on his glucometer in the range of 80s. Son believes pt continued to take his insulin and oral diabetic regimen. Son states that pt was eating but may have been eating less in the last few days. Later on pt noted to have tonic-clonic Sz.

## 2022-12-16 NOTE — PROGRESS NOTE ADULT - SUBJECTIVE AND OBJECTIVE BOX
Patient is a 72y old  Male who presents with a chief complaint of s/p cardiac arrest, hypoglycemia (16 Dec 2022 06:46)    INTERVAL HPI/OVERNIGHT EVENTS: fever overnight     MEDICATIONS  (STANDING):  aspirin  chewable 81 milliGRAM(s) Oral daily  atorvastatin 20 milliGRAM(s) Oral at bedtime  bacitracin   Ointment 1 Application(s) Topical two times a day  chlorhexidine 0.12% Liquid 15 milliLiter(s) Oral Mucosa every 12 hours  chlorhexidine 2% Cloths 1 Application(s) Topical <User Schedule>  collagenase Ointment 1 Application(s) Topical two times a day  cyanocobalamin 1000 MICROGram(s) Oral daily  enoxaparin Injectable 80 milliGRAM(s) SubCutaneous every 12 hours  folic acid 1 milliGRAM(s) Oral daily  insulin glargine Injectable (LANTUS) 20 Unit(s) SubCutaneous every morning  insulin lispro (ADMELOG) corrective regimen sliding scale   SubCutaneous every 6 hours  levETIRAcetam 1500 milliGRAM(s) Oral two times a day  metoprolol tartrate 25 milliGRAM(s) Oral two times a day  polyethylene glycol 3350 17 Gram(s) Oral daily  senna 2 Tablet(s) Oral at bedtime  silver sulfADIAZINE 1% Cream 1 Application(s) Topical two times a day  valproic  acid Syrup 500 milliGRAM(s) Enteral Tube every 8 hours  vancomycin  IVPB 1250 milliGRAM(s) IV Intermittent every 12 hours    MEDICATIONS  (PRN):  acetaminophen     Tablet .. 650 milliGRAM(s) Oral every 6 hours PRN Temp greater or equal to 38C (100.4F), Mild Pain (1 - 3)  aluminum hydroxide/magnesium hydroxide/simethicone Suspension 30 milliLiter(s) Oral every 4 hours PRN Dyspepsia  traMADol 25 milliGRAM(s) Oral every 8 hours PRN Severe Pain (7 - 10)    Allergies    No Known Allergies    Intolerances      REVIEW OF SYSTEMS:  All other systems reviewed and are negative    Vital Signs Last 24 Hrs  T(C): 37 (16 Dec 2022 04:53), Max: 38.2 (15 Dec 2022 23:46)  T(F): 98.6 (16 Dec 2022 04:53), Max: 100.7 (15 Dec 2022 23:46)  HR: 88 (16 Dec 2022 08:34) (81 - 104)  BP: 128/66 (16 Dec 2022 04:53) (123/74 - 130/72)  BP(mean): --  RR: 19 (16 Dec 2022 04:53) (18 - 20)  SpO2: 99% (16 Dec 2022 08:34) (98% - 100%)    Parameters below as of 16 Dec 2022 04:53  Patient On (Oxygen Delivery Method): ventilator      Daily     Daily Weight in k.8 (16 Dec 2022 04:53)  I&O's Summary    CAPILLARY BLOOD GLUCOSE      POCT Blood Glucose.: 156 mg/dL (16 Dec 2022 08:03)  POCT Blood Glucose.: 192 mg/dL (16 Dec 2022 05:31)  POCT Blood Glucose.: 128 mg/dL (15 Dec 2022 23:26)  POCT Blood Glucose.: 137 mg/dL (15 Dec 2022 17:05)  POCT Blood Glucose.: 184 mg/dL (15 Dec 2022 11:30)    PHYSICAL EXAM:  GENERAL: NAD,  on trach to vent   HEAD:  Atraumatic, Normocephalic  EYES: EOMI, PERRLA, conjunctiva and sclera clear  ENMT: No tonsillar erythema, exudates, or enlargement; Moist mucous membranes   NECK: Supple, No JVD, +TRACH  NERVOUS SYSTEM:  awake and alert,  moving extremities spontaneously   CHEST/LUNG: CTAB;  No rales, rhonchi, wheezing, or rubs  HEART: Regular rate and rhythm; No murmurs, rubs, or gallops  ABDOMEN: Soft, Nontender, Nondistended; Bowel sounds present; +PEG   EXTREMITIES:   left hand dressing cdi   Labs                          8.9    6.17  )-----------( 211      ( 16 Dec 2022 07:38 )             28.4     12-16    139  |  101  |  23  ----------------------------<  167<H>  4.0   |  34<H>  |  0.76    Ca    8.9      16 Dec 2022 07:38      PTT - ( 16 Dec 2022 07:38 )  PTT:30.3 sec          Culture - Abscess with Gram Stain (collected 13 Dec 2022 13:40)  Source: .Abscess left hand  Preliminary Report (16 Dec 2022 00:04):    Moderate Enterococcus faecalis    Rare Staphylococcus epidermidis "Susceptibilities not performed"  Organism: Enterococcus faecalis (15 Dec 2022 21:48)  Organism: Enterococcus faecalis (15 Dec 2022 21:48)                DVT prophylaxis: > Lovenox 40mg SQ daily  > Heparin   > SCD's

## 2022-12-16 NOTE — PROGRESS NOTE ADULT - SUBJECTIVE AND OBJECTIVE BOX
BRANDON CAMARILLO    [address] [address] [address]    Allergies    No Known Allergies    Intolerances        PAST MEDICAL & SURGICAL HISTORY:  HTN (hypertension)      HLD (hyperlipidemia)      Diabetes mellitus      Lacunar infarction      BPH (benign prostatic hyperplasia)      CHF (congestive heart failure)      Chronic obstructive pulmonary disease, unspecified COPD type      S/P CABG (coronary artery bypass graft)  2014          FAMILY HISTORY:      Home Medications:  aspirin 81 mg oral delayed release tablet: 1 tab(s) orally once a day (12 Jun 2020 17:35)  Liquifilm Tears preserved ophthalmic solution: 1 drop(s) to each affected eye 2 times a day (12 Jun 2020 17:35)      MEDICATIONS  (STANDING):  aspirin  chewable 81 milliGRAM(s) Oral daily  atorvastatin 20 milliGRAM(s) Oral at bedtime  bacitracin   Ointment 1 Application(s) Topical two times a day  chlorhexidine 0.12% Liquid 15 milliLiter(s) Oral Mucosa every 12 hours  chlorhexidine 2% Cloths 1 Application(s) Topical <User Schedule>  collagenase Ointment 1 Application(s) Topical two times a day  cyanocobalamin 1000 MICROGram(s) Oral daily  enoxaparin Injectable 80 milliGRAM(s) SubCutaneous every 12 hours  folic acid 1 milliGRAM(s) Oral daily  insulin glargine Injectable (LANTUS) 20 Unit(s) SubCutaneous every morning  insulin lispro (ADMELOG) corrective regimen sliding scale   SubCutaneous every 6 hours  levETIRAcetam 1500 milliGRAM(s) Oral two times a day  metoprolol tartrate 25 milliGRAM(s) Oral two times a day  polyethylene glycol 3350 17 Gram(s) Oral daily  senna 2 Tablet(s) Oral at bedtime  silver sulfADIAZINE 1% Cream 1 Application(s) Topical two times a day  valproic  acid Syrup 500 milliGRAM(s) Enteral Tube every 8 hours  vancomycin  IVPB 1250 milliGRAM(s) IV Intermittent every 12 hours    MEDICATIONS  (PRN):  acetaminophen     Tablet .. 650 milliGRAM(s) Oral every 6 hours PRN Temp greater or equal to 38C (100.4F), Mild Pain (1 - 3)  aluminum hydroxide/magnesium hydroxide/simethicone Suspension 30 milliLiter(s) Oral every 4 hours PRN Dyspepsia  traMADol 25 milliGRAM(s) Oral every 8 hours PRN Severe Pain (7 - 10)      Diet, NPO with Tube Feed:   Tube Feeding Modality: Gastrostomy  Glucerna 1.2 Pedrito  Total Volume for 24 Hours (mL): 1440  Continuous  Starting Tube Feed Rate mL per Hour: 50  Increase Tube Feed Rate by (mL): 5     Every 8 hours  Until Goal Tube Feed Rate (mL per Hour): 60  Tube Feed Duration (in Hours): 24  Tube Feed Start Time: 13:00 (12-15-22 @ 12:26) [Pending Verification By Attending]  Diet, NPO with Tube Feed:   Tube Feeding Modality: Gastrostomy  Vital AF 1.2  Total Volume for 24 Hours (mL): 1200  Continuous  Starting Tube Feed Rate mL per Hour: 45  Increase Tube Feed Rate by (mL): 5     Every 12 hours  Until Goal Tube Feed Rate (mL per Hour): 50  Tube Feed Duration (in Hours): 24  Tube Feed Start Time: 15:00 (12-08-22 @ 14:46) [Active]          Vital Signs Last 24 Hrs  T(C): 37 (16 Dec 2022 04:53), Max: 38.2 (15 Dec 2022 23:46)  T(F): 98.6 (16 Dec 2022 04:53), Max: 100.7 (15 Dec 2022 23:46)  HR: 84 (16 Dec 2022 04:58) (65 - 104)  BP: 128/66 (16 Dec 2022 04:53) (123/74 - 144/80)  BP(mean): --  RR: 19 (16 Dec 2022 04:53) (18 - 20)  SpO2: 98% (16 Dec 2022 04:58) (98% - 100%)    Parameters below as of 16 Dec 2022 04:53  Patient On (Oxygen Delivery Method): ventilator            Mode: AC/ CMV (Assist Control/ Continuous Mandatory Ventilation), RR (machine): 14, TV (machine): 450, FiO2: 30, PEEP: 5, ITime: 1, MAP: 9.1, PIP: 29      LABS:                        8.8    6.98  )-----------( 187      ( 15 Dec 2022 08:33 )             28.6           PTT - ( 15 Dec 2022 08:33 )  PTT:57.9 sec          WBC:  WBC Count: 6.98 K/uL (12-15 @ 08:33)  WBC Count: 7.81 K/uL (12-14 @ 10:15)  WBC Count: 7.44 K/uL (12-13 @ 04:02)  WBC Count: 7.96 K/uL (12-12 @ 17:45)      MICROBIOLOGY:  RECENT CULTURES:  12-13 .Abscess left hand Enterococcus faecalis XXXX   Moderate Enterococcus faecalis  Rare Staphylococcus epidermidis "Susceptibilities not performed"                PTT - ( 15 Dec 2022 08:33 )  PTT:57.9 sec    Sodium:  Sodium, Serum: 139 mmol/L (12-13 @ 04:02)      0.62 mg/dL 12-13 @ 04:02      Hemoglobin:  Hemoglobin: 8.8 g/dL (12-15 @ 08:33)  Hemoglobin: 9.4 g/dL (12-14 @ 10:15)  Hemoglobin: 9.7 g/dL (12-13 @ 04:02)  Hemoglobin: 9.5 g/dL (12-12 @ 17:45)      Platelets: Platelet Count - Automated: 187 K/uL (12-15 @ 08:33)  Platelet Count - Automated: 210 K/uL (12-14 @ 10:15)  Platelet Count - Automated: 245 K/uL (12-13 @ 04:02)  Platelet Count - Automated: 248 K/uL (12-12 @ 17:45)              RADIOLOGY & ADDITIONAL STUDIES:      MICROBIOLOGY:  RECENT CULTURES:  12-13 .Abscess left hand Enterococcus faecalis XXXX   Moderate Enterococcus faecalis  Rare Staphylococcus epidermidis "Susceptibilities not performed"

## 2022-12-16 NOTE — PROGRESS NOTE ADULT - SUBJECTIVE AND OBJECTIVE BOX
BRANDON CAMARILLO  MRN-[id number]    Follow Up:  low grade temperatures, hand wound s/p I&D, sacral wound     Interval History: the pt was seen and examined earlier, no distress, vented via tracheostomy. The pt continues having intermittent low grade temperatures, 100.7 last midnight, no leukocytosis, Cr ok.     PAST MEDICAL & SURGICAL HISTORY:  HTN (hypertension)      HLD (hyperlipidemia)      Diabetes mellitus      Lacunar infarction      BPH (benign prostatic hyperplasia)      CHF (congestive heart failure)      Chronic obstructive pulmonary disease, unspecified COPD type      S/P CABG (coronary artery bypass graft)  2014          ROS:    [x ] Unobtainable because: tracheostomy   [ ] All other systems negative    Constitutional: no fever, no chills  Head: no trauma  Eyes: no vision changes, no eye pain  ENT:  no sore throat, no rhinorrhea  Cardiovascular:  no chest pain, no palpitation  Respiratory:  no SOB, no cough  GI:  no abd pain, no vomiting, no diarrhea  urinary: no dysuria, no hematuria, no flank pain  musculoskeletal:  no joint pain, no joint swelling  skin:  no rash  neurology:  no headache, no seizure, no change in mental status  psych: no anxiety, no depression         Allergies  No Known Allergies        ANTIMICROBIALS:  piperacillin/tazobactam IVPB.. 3.375 every 8 hours  vancomycin  IVPB 1250 every 12 hours      OTHER MEDS:  acetaminophen     Tablet .. 650 milliGRAM(s) Oral every 6 hours PRN  aluminum hydroxide/magnesium hydroxide/simethicone Suspension 30 milliLiter(s) Oral every 4 hours PRN  aspirin  chewable 81 milliGRAM(s) Oral daily  atorvastatin 20 milliGRAM(s) Oral at bedtime  bacitracin   Ointment 1 Application(s) Topical two times a day  chlorhexidine 0.12% Liquid 15 milliLiter(s) Oral Mucosa every 12 hours  chlorhexidine 2% Cloths 1 Application(s) Topical <User Schedule>  collagenase Ointment 1 Application(s) Topical two times a day  cyanocobalamin 1000 MICROGram(s) Oral daily  enoxaparin Injectable 80 milliGRAM(s) SubCutaneous every 12 hours  folic acid 1 milliGRAM(s) Oral daily  insulin glargine Injectable (LANTUS) 20 Unit(s) SubCutaneous every morning  insulin lispro (ADMELOG) corrective regimen sliding scale   SubCutaneous every 6 hours  levETIRAcetam 1500 milliGRAM(s) Oral two times a day  metoprolol tartrate 25 milliGRAM(s) Oral two times a day  polyethylene glycol 3350 17 Gram(s) Oral daily  senna 2 Tablet(s) Oral at bedtime  silver sulfADIAZINE 1% Cream 1 Application(s) Topical two times a day  traMADol 25 milliGRAM(s) Oral every 8 hours PRN  valproic  acid Syrup 500 milliGRAM(s) Enteral Tube every 8 hours      Vital Signs Last 24 Hrs  T(C): 36.9 (16 Dec 2022 10:48), Max: 38.2 (15 Dec 2022 23:46)  T(F): 98.4 (16 Dec 2022 10:48), Max: 100.7 (15 Dec 2022 23:46)  HR: 92 (16 Dec 2022 10:48) (81 - 104)  BP: 132/76 (16 Dec 2022 10:48) (123/74 - 132/76)  BP(mean): --  RR: 20 (16 Dec 2022 10:48) (18 - 20)  SpO2: 98% (16 Dec 2022 10:48) (98% - 100%)    Parameters below as of 16 Dec 2022 04:53  Patient On (Oxygen Delivery Method): ventilator        Physical Exam:  General: Nontoxic-appearing Male in no acute distress.  HEENT: AT/NC.. Anicteric. Conjunctiva pink and moist. Oropharynx unable due to patient compliance.   Neck: Not rigid. No sense of mass. Trach C/D/I  Nodes: None palpable.  Lungs: Diminished BS Bilaterally   Heart: Regular rate and rhythm.   Abdomen: Bowel sounds present and normoactive. Soft. Nondistended. Nontender. No sense of mass. No organomegaly. PEG in place c/d/i  Back: sacral wound with slough at the base, mild erythema of surrounding tissues, possibly malodorous, pt had bowel movement as well  Extremities: No cyanosis or clubbing. LUE swelling is better, left hand wound with no significant erythema or swelling of surrounding tissues, no drainage is present on today's exam, dry, no pus, no malodor   Skin: Warm. Dry. Good turgor. No rash. No vasculitic stigmata.  Psychiatric: Unable to assess     WBC Count: 6.17 K/uL (12-16 @ 07:38)  WBC Count: 6.98 K/uL (12-15 @ 08:33)  WBC Count: 7.81 K/uL (12-14 @ 10:15)  WBC Count: 7.44 K/uL (12-13 @ 04:02)  WBC Count: 7.96 K/uL (12-12 @ 17:45)  WBC Count: 7.96 K/uL (12-12 @ 06:35)  WBC Count: 9.12 K/uL (12-10 @ 06:38)                            8.9    6.17  )-----------( 211      ( 16 Dec 2022 07:38 )             28.4       12-16    139  |  101  |  23  ----------------------------<  167<H>  4.0   |  34<H>  |  0.76    Ca    8.9      16 Dec 2022 07:38            Creatinine Trend: 0.76<--, 0.62<--, 0.59<--, 0.60<--, 0.60<--, 0.69<--      MICROBIOLOGY:  v  .Abscess left hand  12-13-22   Moderate Enterococcus faecalis  Rare Staphylococcus epidermidis "Susceptibilities not performed"  --  Enterococcus faecalis      Clean Catch Clean Catch (Midstream)  11-26-22   >100,000 CFU/ml Escherichia coli  --  Escherichia coli      ET Tube ET Tube  11-25-22   Normal Respiratory Larissa present  --    Rare polymorphonuclear leukocytes per low power field  Rare Squamous epithelial cells per low power field  Few Gram Negative Rods per oil power field      .Blood Blood-Peripheral  11-25-22   No Growth Final  --  --      .Blood Blood  11-19-22   No Growth Final  --  --      .Blood Blood  11-18-22   No Growth Final  --  --      ET Tube ET Tube  11-18-22   Few Aspergillus fumigatus complex  Normal Respiratory Larissa present  --    Few polymorphonuclear leukocytes per low power field  Few Squamous epithelial cells per low power field  Moderate Yeast per oil power field  Few Gram Positive Cocci in Clusters per oil power field    Ferritin, Serum: 536 (12-13)    SARS-CoV-2: NotDetec (03 Dec 2022 09:45)  SARS-CoV-2: NotDetec (02 Dec 2022 01:55)  SARS-CoV-2: NotDetec (30 Nov 2022 02:30)  [facility]: [facility] (20 Nov 2022 09:10)    RADIOLOGY:

## 2022-12-17 LAB
GLUCOSE BLDC GLUCOMTR-MCNC: 132 MG/DL — HIGH (ref 70–99)
GLUCOSE BLDC GLUCOMTR-MCNC: 169 MG/DL — HIGH (ref 70–99)
GLUCOSE BLDC GLUCOMTR-MCNC: 175 MG/DL — HIGH (ref 70–99)
GLUCOSE BLDC GLUCOMTR-MCNC: 180 MG/DL — HIGH (ref 70–99)
HCT VFR BLD CALC: 31 % — LOW (ref 39–50)
HGB BLD-MCNC: 9.5 G/DL — LOW (ref 13–17)
MCHC RBC-ENTMCNC: 30 PG — SIGNIFICANT CHANGE UP (ref 27–34)
MCHC RBC-ENTMCNC: 30.6 G/DL — LOW (ref 32–36)
MCV RBC AUTO: 97.8 FL — SIGNIFICANT CHANGE UP (ref 80–100)
NRBC # BLD: 0 /100 WBCS — SIGNIFICANT CHANGE UP (ref 0–0)
PLATELET # BLD AUTO: 202 K/UL — SIGNIFICANT CHANGE UP (ref 150–400)
RBC # BLD: 3.17 M/UL — LOW (ref 4.2–5.8)
RBC # FLD: 14.7 % — HIGH (ref 10.3–14.5)
VANCOMYCIN TROUGH SERPL-MCNC: 22 UG/ML — HIGH (ref 10–20)
WBC # BLD: 8.97 K/UL — SIGNIFICANT CHANGE UP (ref 3.8–10.5)
WBC # FLD AUTO: 8.97 K/UL — SIGNIFICANT CHANGE UP (ref 3.8–10.5)

## 2022-12-17 PROCEDURE — 99233 SBSQ HOSP IP/OBS HIGH 50: CPT

## 2022-12-17 PROCEDURE — 99232 SBSQ HOSP IP/OBS MODERATE 35: CPT

## 2022-12-17 RX ADMIN — PIPERACILLIN AND TAZOBACTAM 25 GRAM(S): 4; .5 INJECTION, POWDER, LYOPHILIZED, FOR SOLUTION INTRAVENOUS at 05:28

## 2022-12-17 RX ADMIN — Medication 166.67 MILLIGRAM(S): at 18:04

## 2022-12-17 RX ADMIN — Medication 1 APPLICATION(S): at 18:04

## 2022-12-17 RX ADMIN — Medication 25 MILLIGRAM(S): at 05:26

## 2022-12-17 RX ADMIN — PIPERACILLIN AND TAZOBACTAM 25 GRAM(S): 4; .5 INJECTION, POWDER, LYOPHILIZED, FOR SOLUTION INTRAVENOUS at 13:11

## 2022-12-17 RX ADMIN — LEVETIRACETAM 1500 MILLIGRAM(S): 250 TABLET, FILM COATED ORAL at 17:50

## 2022-12-17 RX ADMIN — Medication 1 APPLICATION(S): at 05:25

## 2022-12-17 RX ADMIN — Medication 1 APPLICATION(S): at 05:24

## 2022-12-17 RX ADMIN — INSULIN GLARGINE 20 UNIT(S): 100 INJECTION, SOLUTION SUBCUTANEOUS at 08:46

## 2022-12-17 RX ADMIN — Medication 1 APPLICATION(S): at 17:49

## 2022-12-17 RX ADMIN — Medication 1 APPLICATION(S): at 05:27

## 2022-12-17 RX ADMIN — PREGABALIN 1000 MICROGRAM(S): 225 CAPSULE ORAL at 12:30

## 2022-12-17 RX ADMIN — Medication 1 MILLIGRAM(S): at 12:30

## 2022-12-17 RX ADMIN — ENOXAPARIN SODIUM 80 MILLIGRAM(S): 100 INJECTION SUBCUTANEOUS at 05:25

## 2022-12-17 RX ADMIN — CHLORHEXIDINE GLUCONATE 15 MILLILITER(S): 213 SOLUTION TOPICAL at 17:47

## 2022-12-17 RX ADMIN — ATORVASTATIN CALCIUM 20 MILLIGRAM(S): 80 TABLET, FILM COATED ORAL at 22:50

## 2022-12-17 RX ADMIN — LEVETIRACETAM 1500 MILLIGRAM(S): 250 TABLET, FILM COATED ORAL at 05:26

## 2022-12-17 RX ADMIN — Medication 2: at 12:28

## 2022-12-17 RX ADMIN — Medication 500 MILLIGRAM(S): at 22:50

## 2022-12-17 RX ADMIN — Medication 81 MILLIGRAM(S): at 12:31

## 2022-12-17 RX ADMIN — Medication 1 APPLICATION(S): at 17:48

## 2022-12-17 RX ADMIN — PIPERACILLIN AND TAZOBACTAM 25 GRAM(S): 4; .5 INJECTION, POWDER, LYOPHILIZED, FOR SOLUTION INTRAVENOUS at 22:50

## 2022-12-17 RX ADMIN — Medication 500 MILLIGRAM(S): at 05:23

## 2022-12-17 RX ADMIN — CHLORHEXIDINE GLUCONATE 1 APPLICATION(S): 213 SOLUTION TOPICAL at 05:28

## 2022-12-17 RX ADMIN — Medication 25 MILLIGRAM(S): at 17:49

## 2022-12-17 RX ADMIN — CHLORHEXIDINE GLUCONATE 15 MILLILITER(S): 213 SOLUTION TOPICAL at 05:24

## 2022-12-17 RX ADMIN — ENOXAPARIN SODIUM 80 MILLIGRAM(S): 100 INJECTION SUBCUTANEOUS at 17:48

## 2022-12-17 RX ADMIN — Medication 500 MILLIGRAM(S): at 13:11

## 2022-12-17 NOTE — PROGRESS NOTE ADULT - ASSESSMENT
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 Unknown how long pt unresponsive for at home. Blood sugar in the 40s with EMS s/p glucagon. On arrival to ER pt hypothermic and agonally breathing. Pt became unresponsive with loss of pulse; ACLS initiated. PEA arrest with ROSC after approximately 5 min s/p Epi x2.  Pt intubated during CODE BLUE. Per son pt on the day prior to presentation reported low blood sugar reads on his glucometer in the range of 80s. Son believes pt continued to take his insulin and oral diabetic regimen. Son states that pt was eating but may have been eating less in the last few days. Later on pt noted to have tonic-clonic Sz.     PEA arrest  s/p Epi x 2. ROSC achieved  Takotsubo cardiomyopathy found on Echo   stable  12/13--cardio eval appreciated, further management can be done as outpatient     Acute hypoxemic respiratory failure now chronic  s/p intubation , failed extubation, now trach to vent  c/w vent managment  pulm consulted  daily SBT ;12/13  SBT done, tolerated for 7 hours, placed back on full support due to frequent apnea episodes  12/12 trach sutures removed by surgery         LUE VTE with cellulitis /infected left hand hematoma s/p bedside debridement 12/13   LUE edema and wound of left hand ; good radial pulse   --LUE venous duplex : occlusive thrombus in the left mid subclavian vein with partial   slow flow detected in the lateral subclavian vein.  --LUE arterial Duplex neg   --started heparin drip,  --vascular following   --12/13 Hand surgery consult appreciated, s/p bedside debridement. Dr. Zuñiga would like patient to be on Ancef (d/c keflex)   --ID consult appreciated  --follow cultures , on vancomycin-- growing enterococcus     Sacral Ulcer  -on zosyn  -discussed with vascular they are aware, patient will likely need debridement     New onset Seizure likely due to anoxic brain injury  discussed with Dr. Loo, patient will be only on Keppra 1500mg bid   off vimpat and Depakote   monitor     ELMER 2 to ATN; resolved    Shock liver.  due to PEA arrest. resolved    DM2   A1c 8.8%  continue with current management   FS goal 140-180   continue with PEG feeds     Hypophosphatemia --repleted     unstageable sacral pressure wound   12/13 PT wound consult , vascular consult for possible debridement     Normocytic anemia   no e/o bleeding or bruising   H/H stable   low normal Vit B12 and folic acid --supplement       Preventative measures   heparin gtt --dvt ppx  fall, aspiration  precautions   HOBE     Dispo: patient is undocumented, will need PRUCOL

## 2022-12-17 NOTE — PROGRESS NOTE ADULT - NS ATTEND AMEND GEN_ALL_CORE FT
Agree with note above,     - Will plan for tracheostomy and PEG this afternoon.  - Procedure and perioperative care discussed with son at bedside.
Patient with left arm swelling, left subclavian vein DVT.   Also with left hand wound.    Plastics planning for left hand debridement today.  Continue ac for treatment of DVT.   Recommend left arm compression and elevation.
patient is seen and examiend and agree with the above assessment and plan

## 2022-12-17 NOTE — PROGRESS NOTE ADULT - SUBJECTIVE AND OBJECTIVE BOX
BRANDON CAMARILLO    [address]    Allergies    No Known Allergies    Intolerances        PAST MEDICAL & SURGICAL HISTORY:  HTN (hypertension)      HLD (hyperlipidemia)      Diabetes mellitus      Lacunar infarction      BPH (benign prostatic hyperplasia)      CHF (congestive heart failure)      Chronic obstructive pulmonary disease, unspecified COPD type      S/P CABG (coronary artery bypass graft)  2014          FAMILY HISTORY:      Home Medications:  aspirin 81 mg oral delayed release tablet: 1 tab(s) orally once a day (12 Jun 2020 17:35)  Liquifilm Tears preserved ophthalmic solution: 1 drop(s) to each affected eye 2 times a day (12 Jun 2020 17:35)      MEDICATIONS  (STANDING):  aspirin  chewable 81 milliGRAM(s) Oral daily  atorvastatin 20 milliGRAM(s) Oral at bedtime  bacitracin   Ointment 1 Application(s) Topical two times a day  chlorhexidine 0.12% Liquid 15 milliLiter(s) Oral Mucosa every 12 hours  chlorhexidine 2% Cloths 1 Application(s) Topical <User Schedule>  collagenase Ointment 1 Application(s) Topical two times a day  cyanocobalamin 1000 MICROGram(s) Oral daily  enoxaparin Injectable 80 milliGRAM(s) SubCutaneous every 12 hours  folic acid 1 milliGRAM(s) Oral daily  insulin glargine Injectable (LANTUS) 20 Unit(s) SubCutaneous every morning  insulin lispro (ADMELOG) corrective regimen sliding scale   SubCutaneous every 6 hours  levETIRAcetam 1500 milliGRAM(s) Oral two times a day  metoprolol tartrate 25 milliGRAM(s) Oral two times a day  piperacillin/tazobactam IVPB.. 3.375 Gram(s) IV Intermittent every 8 hours  polyethylene glycol 3350 17 Gram(s) Oral daily  senna 2 Tablet(s) Oral at bedtime  silver sulfADIAZINE 1% Cream 1 Application(s) Topical two times a day  valproic  acid Syrup 500 milliGRAM(s) Enteral Tube every 8 hours  vancomycin  IVPB 1250 milliGRAM(s) IV Intermittent every 12 hours    MEDICATIONS  (PRN):  acetaminophen     Tablet .. 650 milliGRAM(s) Oral every 6 hours PRN Temp greater or equal to 38C (100.4F), Mild Pain (1 - 3)  aluminum hydroxide/magnesium hydroxide/simethicone Suspension 30 milliLiter(s) Oral every 4 hours PRN Dyspepsia  traMADol 25 milliGRAM(s) Oral every 8 hours PRN Severe Pain (7 - 10)      Diet, NPO with Tube Feed:   Tube Feeding Modality: Gastrostomy  Glucerna 1.2 Pedrito  Total Volume for 24 Hours (mL): 1440  Continuous  Starting Tube Feed Rate mL per Hour: 50  Increase Tube Feed Rate by (mL): 5     Every 8 hours  Until Goal Tube Feed Rate (mL per Hour): 60  Tube Feed Duration (in Hours): 24  Tube Feed Start Time: 13:00 (12-15-22 @ 12:26) [Active]          Vital Signs Last 24 Hrs  T(C): 37.4 (17 Dec 2022 04:57), Max: 37.4 (16 Dec 2022 23:55)  T(F): 99.3 (17 Dec 2022 04:57), Max: 99.4 (16 Dec 2022 23:55)  HR: 97 (17 Dec 2022 06:21) (80 - 105)  BP: 138/77 (17 Dec 2022 04:57) (114/73 - 138/77)  BP(mean): --  RR: 18 (17 Dec 2022 04:57) (18 - 20)  SpO2: 98% (17 Dec 2022 06:21) (93% - 100%)    Parameters below as of 17 Dec 2022 04:57  Patient On (Oxygen Delivery Method): tracheostomy collar            Mode: AC/ CMV (Assist Control/ Continuous Mandatory Ventilation), RR (machine): 14, TV (machine): 450, FiO2: 30, PEEP: 5, ITime: 1, MAP: 9, PIP: 25      LABS:                        9.5    8.97  )-----------( 202      ( 17 Dec 2022 07:00 )             31.0     12-16    139  |  101  |  23  ----------------------------<  167<H>  4.0   |  34<H>  |  0.76    Ca    8.9      16 Dec 2022 07:38      PTT - ( 16 Dec 2022 07:38 )  PTT:30.3 sec          WBC:  WBC Count: 8.97 K/uL (12-17 @ 07:00)  WBC Count: 6.17 K/uL (12-16 @ 07:38)  WBC Count: 6.98 K/uL (12-15 @ 08:33)  WBC Count: 7.81 K/uL (12-14 @ 10:15)      MICROBIOLOGY:  RECENT CULTURES:  12-15 .Blood Blood-Peripheral XXXX XXXX   No growth to date.    12-13 .Abscess left hand Enterococcus faecalis XXXX   Moderate Enterococcus faecalis  Rare Staphylococcus epidermidis "Susceptibilities not performed"                PTT - ( 16 Dec 2022 07:38 )  PTT:30.3 sec    Sodium:  Sodium, Serum: 139 mmol/L (12-16 @ 07:38)      0.76 mg/dL 12-16 @ 07:38      Hemoglobin:  Hemoglobin: 9.5 g/dL (12-17 @ 07:00)  Hemoglobin: 8.9 g/dL (12-16 @ 07:38)  Hemoglobin: 8.8 g/dL (12-15 @ 08:33)  Hemoglobin: 9.4 g/dL (12-14 @ 10:15)      Platelets: Platelet Count - Automated: 202 K/uL (12-17 @ 07:00)  Platelet Count - Automated: 211 K/uL (12-16 @ 07:38)  Platelet Count - Automated: 187 K/uL (12-15 @ 08:33)  Platelet Count - Automated: 210 K/uL (12-14 @ 10:15)              RADIOLOGY & ADDITIONAL STUDIES:      MICROBIOLOGY:  RECENT CULTURES:  12-15 .Blood Blood-Peripheral XXXX XXXX   No growth to date.    12-13 .Abscess left hand Enterococcus faecalis XXXX   Moderate Enterococcus faecalis  Rare Staphylococcus epidermidis "Susceptibilities not performed"

## 2022-12-17 NOTE — PROGRESS NOTE ADULT - ASSESSMENT
REVIEW OF SYMPTOMS      Able to give (reliable) ROS  NO     PHYSICAL EXAM    HEENT Unremarkable  atraumatic   RESP Fair air entry EXP prolonged    Harsh breath sound Resp distres mild   CARDIAC S1 S2 No S3     NO JVD    ABDOMEN SOFT BS PRESENT NOT DISTENDED No hepatosplenomegaly   PEDAL EDEMA present No calf tenderness  NO rash       GENERAL DATA .   GOC.  12/11/2022 full code       ALLGY.  nka                            WT. ..  12/2/2022 86  BMI. ..    12/2/2022 29                          ICU STAY.  .. 11/29-12/9  COVID.   .. 12/2/2022 scv2 (-)   ..  11/20/2022 scv2 (-)   BEST PRACTICE ISSUES.    HOB ELEVATN. Yes  DVT PPLX.   12/15/2022 lvnx 80.2 ( l sc thrombus)    ALEJO PPLX.   ..      INFN PPLX.   ..  11/25 chlorhex .12%   .. 11/14 chlorhex 2%   SP SW ANTWAN.         DIET.    ..  12/8 vital 1.2 1200 gt   IV fl.  ..            PROCEDURES.  .. 12/5/2022 trach  .. 12/5/2022 peg   .. 12/12 r arm midline       ABGS.  12/3/2022 ac 14/450/.3/5 750/46/75      VS/ PO/IO/ VENT/ DRIPS.   12/17/2022 afeb 60 100/60   12/17/2022 ac 14/450/5/.3     PATIENT PRESENTATION.  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 Unknown how long pt unresponsive for at home. Blood sugar in the 40s with EMS s/p glucagon. On arrival to ER pt hypothermic and agonally breathing. Pt became unresponsive with loss of pulse; ACLS initiated. PEA arrest with ROSC after approximately 5 min s/p Epi x2.  Pt intubated during CODE BLUE. Per son pt on the day prior to presentation reported low blood sugar reads on his glucometer in the range of 80s. Son believes pt continued to take his insulin and oral diabetic regimen. Son states that pt was eating but may have been eating less in the last few days. Later on pt noted to have tonic-clonic Sz. ICU dx: 1) Hypoglycemia 2) PEA arrest 3) Takotsubo cardiomyopathy found on Echo 4) New onset Sz either 2 to hypoglycemia vs anoxic injury 5) ELMER 2 to ATN 6) Shock liver. Pt with improvement in MS. s/p trach/PEG on 12/5  Pt downgraded to floor  Pulm consulted 12/11/2022     MAIN ISSUES.  PMH CAD s/p PCI with stent 2015 and CABG 2014,   PMH  s/p medtronic PPM,   PMH CVA (lacunar infarct)  1) PEA arrest with ROSC after approximately 5 min Now communicative   2) DVT 12/12 l Subclav thromS 12/15/2022 lvnx 80.2  3) TRACH 12/5/2022 trach  4) PEG12/5/2022 peg   5) Cellulitis hand absc 12/13 mod enterococcus fecalis  staph epi 12/15 vanco 12/16 zosyn      PROBLEM ASSESSMENT RECOMMENDATIONS..  Sp  cac 11/14/2022   .. Patient is interactive and answers questions appropriately as dw son on 12/13/2022   VTE.  .. 12/12/2022 Pt found to have l subclav dvt    12/12/2022 iv ufh startd   .. 12/15/2022 changed to lvnx   Trach 12/5   .. trach care   vent management.  .. vent bundle dsbt dsv target pplat 30 (-) PO2 60 (+) pH 730 (+)   weaming.  .. 12/13-12/14-12/16/2022 tolerated cpap 12/13-12/14-12/16/20 rsbi 78-94-71  .. 12/15/2022 tolerated cpap 4 h   Infection.  ..  Monitor for vap    cellulitis hand   .. w 12/16/2022 w 6   .. bc 12/15 (-)   .. absc 12/13 mod enterococcus fecalis  staph epi   .. 12/12-12/15/2022  5 d course cephalexin for cellulitis  .. 12/15/2022 vanco 1250x2 Dr VICTOR    .. 12/16/2022 zosyn   CAD.  .. Monitor   CHF.   .. 11/14/2022 echo mod decr segmental lvsf apical lateral apiccal ant segment are abn takotsubo cmpthy pasp 42 .  .. monitor for chf   anemia.  .. Hb 12/16-12/17/2022 Hb 8.9 - 9.5   .. Monitor  Target Hb 7 (+)   DECUB.  .. 12/2 collagenase   SEIZURES.  .. sz meds as guided by neuro   NONVIOLENT NONSELF DESTRUCTIVE LEVEL 1   .. 12/13/2022       TIME SPENT   Over 25 minutes aggregate care time spent on encounter; activities included   direct patient care, counseling and/or coordinating care reviewing notes, lab data/ imaging , discussion with multidisciplinary team/ patient  /family and explaining in detail risks, benefits, alternatives  of the recommendations     MARQUISE TAYLOR 72 m 11/14/2022 1948 Dr Leela Dowling

## 2022-12-17 NOTE — PROGRESS NOTE ADULT - SUBJECTIVE AND OBJECTIVE BOX
Patient seen and examined bedside with Sami Diaz, pt resting comfortably. Pt's son at bedside.  No new complaints offered. pt on vent via trach collar, tube feeds vis peg tube.     Vital Signs Last 24 Hrs  T(C): 36.5 (17 Dec 2022 17:10), Max: 37.4 (16 Dec 2022 23:55)  T(F): 97.7 (17 Dec 2022 17:10), Max: 99.4 (16 Dec 2022 23:55)  HR: 64 (17 Dec 2022 17:10) (64 - 100)  BP: 100/61 (17 Dec 2022 17:10) (100/61 - 138/77)  BP(mean): --  RR: 18 (17 Dec 2022 17:10) (18 - 20)  SpO2: 100% (17 Dec 2022 17:10) (93% - 100%)    Parameters below as of 17 Dec 2022 10:00  Patient On (Oxygen Delivery Method): tracheostomy collar      I&O's Summary        PHYSICAL EXAM:  GENERAL: NAD,  on trach to vent   HEAD:  Atraumatic, Normocephalic  EYES: EOMI, PERRLA, conjunctiva and sclera clear  ENMT: No tonsillar erythema, exudates, or enlargement; Moist mucous membranes   NECK: Supple, No JVD, +TRACH  NERVOUS SYSTEM:  awake and alert,  moving extremities spontaneously   CHEST/LUNG: CTAB;  No rales, rhonchi, wheezing, or rubs  HEART: Regular rate and rhythm; No murmurs, rubs, or gallops  ABDOMEN: Soft, Nontender, Nondistended; Bowel sounds present; +PEG wioth tube feed at goal  SACRUM: 6a3exlb stage 3 pressure ulcer with slough, no active bleeding, pus or tunnelling  EXTREMITIES:   left hand dressing cdi       LABS:                        9.5    8.97  )-----------( 202      ( 17 Dec 2022 07:00 )             31.0     12-16    139  |  101  |  23  ----------------------------<  167<H>  4.0   |  34<H>  |  0.76    Ca    8.9      16 Dec 2022 07:38      PTT - ( 16 Dec 2022 07:38 )  PTT:30.3 sec    Culture Results:   No growth to date. (12-15 @ 10:26)  Culture Results:   No growth to date. (12-15 @ 10:26)        A/P: 71 yo M with h/o COPD, CAD s/p PCI with stent 2015 and CABG 2014, chronic diastolic heart failure (EF 50%), 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 Unknown how long pt unresponsive for at home. Blood sugar in the 40s with EMS s/p glucagon. On arrival to ER pt hypothermic and agonally breathing. Pt became unresponsive with loss of pulse; ACLS initiated. PEA arrest with ROSC after approximately 5 min s/p Epi x2.  Pt intubated during CODE BLUE. Per son pt on the day prior to presentation reported low blood sugar reads on his glucometer in the range of 80s. Son believes pt continued to take his insulin and oral diabetic regimen. Son states that pt was eating but may have been eating less in the last few days. Later on pt noted to have tonic-clonic Sz. Now with sacral decub.  Discusse with son that pt would benefit from debridement, son agrees, will perform bedside debridement of sacral ulcer on Monday  abx per ID  cont medical management

## 2022-12-17 NOTE — PROGRESS NOTE ADULT - SUBJECTIVE AND OBJECTIVE BOX
Patient is a 72y old  Male who presents with a chief complaint of s/p cardiac arrest, hypoglycemia (17 Dec 2022 09:27)    INTERVAL HPI/OVERNIGHT EVENTS: no events     MEDICATIONS  (STANDING):  aspirin  chewable 81 milliGRAM(s) Oral daily  atorvastatin 20 milliGRAM(s) Oral at bedtime  bacitracin   Ointment 1 Application(s) Topical two times a day  chlorhexidine 0.12% Liquid 15 milliLiter(s) Oral Mucosa every 12 hours  chlorhexidine 2% Cloths 1 Application(s) Topical <User Schedule>  collagenase Ointment 1 Application(s) Topical two times a day  cyanocobalamin 1000 MICROGram(s) Oral daily  enoxaparin Injectable 80 milliGRAM(s) SubCutaneous every 12 hours  folic acid 1 milliGRAM(s) Oral daily  insulin glargine Injectable (LANTUS) 20 Unit(s) SubCutaneous every morning  insulin lispro (ADMELOG) corrective regimen sliding scale   SubCutaneous every 6 hours  levETIRAcetam 1500 milliGRAM(s) Oral two times a day  metoprolol tartrate 25 milliGRAM(s) Oral two times a day  piperacillin/tazobactam IVPB.. 3.375 Gram(s) IV Intermittent every 8 hours  polyethylene glycol 3350 17 Gram(s) Oral daily  senna 2 Tablet(s) Oral at bedtime  silver sulfADIAZINE 1% Cream 1 Application(s) Topical two times a day  valproic  acid Syrup 500 milliGRAM(s) Enteral Tube every 8 hours  vancomycin  IVPB 1250 milliGRAM(s) IV Intermittent every 12 hours    MEDICATIONS  (PRN):  acetaminophen     Tablet .. 650 milliGRAM(s) Oral every 6 hours PRN Temp greater or equal to 38C (100.4F), Mild Pain (1 - 3)  aluminum hydroxide/magnesium hydroxide/simethicone Suspension 30 milliLiter(s) Oral every 4 hours PRN Dyspepsia  traMADol 25 milliGRAM(s) Oral every 8 hours PRN Severe Pain (7 - 10)    Allergies    No Known Allergies    Intolerances      REVIEW OF SYSTEMS:  All other systems reviewed and are negative    Vital Signs Last 24 Hrs  T(C): 37.3 (17 Dec 2022 10:36), Max: 37.4 (16 Dec 2022 23:55)  T(F): 99.1 (17 Dec 2022 10:36), Max: 99.4 (16 Dec 2022 23:55)  HR: 90 (17 Dec 2022 10:36) (80 - 105)  BP: 120/73 (17 Dec 2022 10:36) (114/73 - 138/77)  BP(mean): --  RR: 20 (17 Dec 2022 10:36) (18 - 20)  SpO2: 100% (17 Dec 2022 10:36) (93% - 100%)    Parameters below as of 17 Dec 2022 10:00  Patient On (Oxygen Delivery Method): tracheostomy collar      Daily     Daily Weight in k (17 Dec 2022 04:57)  I&O's Summary    CAPILLARY BLOOD GLUCOSE      POCT Blood Glucose.: 175 mg/dL (17 Dec 2022 08:37)  POCT Blood Glucose.: 169 mg/dL (17 Dec 2022 06:09)  POCT Blood Glucose.: 65 mg/dL (16 Dec 2022 23:30)  POCT Blood Glucose.: 306 mg/dL (16 Dec 2022 18:50)  POCT Blood Glucose.: 129 mg/dL (16 Dec 2022 11:46)    PHYSICAL EXAM:  GENERAL: NAD,  on trach to vent   HEAD:  Atraumatic, Normocephalic  EYES: EOMI, PERRLA, conjunctiva and sclera clear  ENMT: No tonsillar erythema, exudates, or enlargement; Moist mucous membranes   NECK: Supple, No JVD, +TRACH  NERVOUS SYSTEM:  awake and alert,  moving extremities spontaneously   CHEST/LUNG: CTAB;  No rales, rhonchi, wheezing, or rubs  HEART: Regular rate and rhythm; No murmurs, rubs, or gallops  ABDOMEN: Soft, Nontender, Nondistended; Bowel sounds present; +PEG   EXTREMITIES:   left hand dressing cdi   Labs                          9.5    8.97  )-----------( 202      ( 17 Dec 2022 07:00 )             31.0     12-16    139  |  101  |  23  ----------------------------<  167<H>  4.0   |  34<H>  |  0.76    Ca    8.9      16 Dec 2022 07:38      PTT - ( 16 Dec 2022 07:38 )  PTT:30.3 sec          Culture - Blood (collected 15 Dec 2022 10:26)  Source: .Blood Blood-Peripheral  Preliminary Report (16 Dec 2022 17:02):    No growth to date.    Culture - Blood (collected 15 Dec 2022 10:26)  Source: .Blood Blood-Peripheral  Preliminary Report (16 Dec 2022 17:02):    No growth to date.                DVT prophylaxis: > Lovenox 40mg SQ daily  > Heparin   > SCD's

## 2022-12-18 ENCOUNTER — TRANSCRIPTION ENCOUNTER (OUTPATIENT)
Age: 72
End: 2022-12-18

## 2022-12-18 LAB
ANION GAP SERPL CALC-SCNC: 4 MMOL/L — LOW (ref 5–17)
BUN SERPL-MCNC: 21 MG/DL — SIGNIFICANT CHANGE UP (ref 7–23)
CALCIUM SERPL-MCNC: 8.9 MG/DL — SIGNIFICANT CHANGE UP (ref 8.5–10.1)
CHLORIDE SERPL-SCNC: 99 MMOL/L — SIGNIFICANT CHANGE UP (ref 96–108)
CO2 SERPL-SCNC: 34 MMOL/L — HIGH (ref 22–31)
CREAT SERPL-MCNC: 0.78 MG/DL — SIGNIFICANT CHANGE UP (ref 0.5–1.3)
CULTURE RESULTS: SIGNIFICANT CHANGE UP
EGFR: 94 ML/MIN/1.73M2 — SIGNIFICANT CHANGE UP
GLUCOSE BLDC GLUCOMTR-MCNC: 112 MG/DL — HIGH (ref 70–99)
GLUCOSE BLDC GLUCOMTR-MCNC: 145 MG/DL — HIGH (ref 70–99)
GLUCOSE BLDC GLUCOMTR-MCNC: 181 MG/DL — HIGH (ref 70–99)
GLUCOSE BLDC GLUCOMTR-MCNC: 183 MG/DL — HIGH (ref 70–99)
GLUCOSE BLDC GLUCOMTR-MCNC: 214 MG/DL — HIGH (ref 70–99)
GLUCOSE SERPL-MCNC: 192 MG/DL — HIGH (ref 70–99)
HCT VFR BLD CALC: 28 % — LOW (ref 39–50)
HGB BLD-MCNC: 8.9 G/DL — LOW (ref 13–17)
MCHC RBC-ENTMCNC: 30.8 PG — SIGNIFICANT CHANGE UP (ref 27–34)
MCHC RBC-ENTMCNC: 31.8 G/DL — LOW (ref 32–36)
MCV RBC AUTO: 96.9 FL — SIGNIFICANT CHANGE UP (ref 80–100)
NRBC # BLD: 0 /100 WBCS — SIGNIFICANT CHANGE UP (ref 0–0)
ORGANISM # SPEC MICROSCOPIC CNT: SIGNIFICANT CHANGE UP
ORGANISM # SPEC MICROSCOPIC CNT: SIGNIFICANT CHANGE UP
PLATELET # BLD AUTO: 241 K/UL — SIGNIFICANT CHANGE UP (ref 150–400)
POTASSIUM SERPL-MCNC: 4 MMOL/L — SIGNIFICANT CHANGE UP (ref 3.5–5.3)
POTASSIUM SERPL-SCNC: 4 MMOL/L — SIGNIFICANT CHANGE UP (ref 3.5–5.3)
RBC # BLD: 2.89 M/UL — LOW (ref 4.2–5.8)
RBC # FLD: 14.8 % — HIGH (ref 10.3–14.5)
SODIUM SERPL-SCNC: 137 MMOL/L — SIGNIFICANT CHANGE UP (ref 135–145)
SPECIMEN SOURCE: SIGNIFICANT CHANGE UP
VANCOMYCIN TROUGH SERPL-MCNC: 18.7 UG/ML — SIGNIFICANT CHANGE UP (ref 10–20)
WBC # BLD: 7 K/UL — SIGNIFICANT CHANGE UP (ref 3.8–10.5)
WBC # FLD AUTO: 7 K/UL — SIGNIFICANT CHANGE UP (ref 3.8–10.5)

## 2022-12-18 PROCEDURE — 99232 SBSQ HOSP IP/OBS MODERATE 35: CPT

## 2022-12-18 RX ADMIN — PIPERACILLIN AND TAZOBACTAM 25 GRAM(S): 4; .5 INJECTION, POWDER, LYOPHILIZED, FOR SOLUTION INTRAVENOUS at 14:21

## 2022-12-18 RX ADMIN — Medication 1 APPLICATION(S): at 05:32

## 2022-12-18 RX ADMIN — Medication 2: at 16:35

## 2022-12-18 RX ADMIN — Medication 1 APPLICATION(S): at 05:33

## 2022-12-18 RX ADMIN — Medication 1 APPLICATION(S): at 16:34

## 2022-12-18 RX ADMIN — Medication 500 MILLIGRAM(S): at 22:35

## 2022-12-18 RX ADMIN — Medication 81 MILLIGRAM(S): at 12:10

## 2022-12-18 RX ADMIN — ATORVASTATIN CALCIUM 20 MILLIGRAM(S): 80 TABLET, FILM COATED ORAL at 22:33

## 2022-12-18 RX ADMIN — INSULIN GLARGINE 20 UNIT(S): 100 INJECTION, SOLUTION SUBCUTANEOUS at 09:28

## 2022-12-18 RX ADMIN — PREGABALIN 1000 MICROGRAM(S): 225 CAPSULE ORAL at 11:58

## 2022-12-18 RX ADMIN — CHLORHEXIDINE GLUCONATE 1 APPLICATION(S): 213 SOLUTION TOPICAL at 05:33

## 2022-12-18 RX ADMIN — Medication 166.67 MILLIGRAM(S): at 11:57

## 2022-12-18 RX ADMIN — Medication 500 MILLIGRAM(S): at 14:20

## 2022-12-18 RX ADMIN — Medication 1 APPLICATION(S): at 16:36

## 2022-12-18 RX ADMIN — ENOXAPARIN SODIUM 80 MILLIGRAM(S): 100 INJECTION SUBCUTANEOUS at 16:34

## 2022-12-18 RX ADMIN — Medication 500 MILLIGRAM(S): at 05:33

## 2022-12-18 RX ADMIN — CHLORHEXIDINE GLUCONATE 15 MILLILITER(S): 213 SOLUTION TOPICAL at 05:34

## 2022-12-18 RX ADMIN — LEVETIRACETAM 1500 MILLIGRAM(S): 250 TABLET, FILM COATED ORAL at 05:35

## 2022-12-18 RX ADMIN — Medication 4: at 05:37

## 2022-12-18 RX ADMIN — SENNA PLUS 2 TABLET(S): 8.6 TABLET ORAL at 22:36

## 2022-12-18 RX ADMIN — ENOXAPARIN SODIUM 80 MILLIGRAM(S): 100 INJECTION SUBCUTANEOUS at 05:33

## 2022-12-18 RX ADMIN — Medication 166.67 MILLIGRAM(S): at 18:24

## 2022-12-18 RX ADMIN — Medication 1 APPLICATION(S): at 15:56

## 2022-12-18 RX ADMIN — Medication 1 APPLICATION(S): at 05:34

## 2022-12-18 RX ADMIN — PIPERACILLIN AND TAZOBACTAM 25 GRAM(S): 4; .5 INJECTION, POWDER, LYOPHILIZED, FOR SOLUTION INTRAVENOUS at 22:32

## 2022-12-18 RX ADMIN — PIPERACILLIN AND TAZOBACTAM 25 GRAM(S): 4; .5 INJECTION, POWDER, LYOPHILIZED, FOR SOLUTION INTRAVENOUS at 05:32

## 2022-12-18 RX ADMIN — Medication 1 MILLIGRAM(S): at 11:58

## 2022-12-18 RX ADMIN — CHLORHEXIDINE GLUCONATE 15 MILLILITER(S): 213 SOLUTION TOPICAL at 16:34

## 2022-12-18 RX ADMIN — Medication 25 MILLIGRAM(S): at 05:31

## 2022-12-18 NOTE — PROGRESS NOTE ADULT - ASSESSMENT
REVIEW OF SYMPTOMS      Able to give (reliable) ROS  NO     PHYSICAL EXAM    HEENT Unremarkable  atraumatic   RESP Fair air entry EXP prolonged    Harsh breath sound Resp distres mild   CARDIAC S1 S2 No S3     NO JVD    ABDOMEN SOFT BS PRESENT NOT DISTENDED No hepatosplenomegaly   PEDAL EDEMA present No calf tenderness  NO rash       GENERAL DATA .   GOC.  12/11/2022 full code       ALLGY.  nka                            WT. ..  12/2/2022 86  BMI. ..    12/2/2022 29                          ICU STAY.  .. 11/29-12/9  COVID.   .. 12/2/2022 scv2 (-)   ..  11/20/2022 scv2 (-)   BEST PRACTICE ISSUES.    HOB ELEVATN. Yes  DVT PPLX.   12/15/2022 lvnx 80.2 ( l sc thrombus)    ALEJO PPLX.   ..      INFN PPLX.   ..  11/25 chlorhex .12%   .. 11/14 chlorhex 2%   SP SW ANTWAN.         DIET.    ..  12/8 vital 1.2 1200 gt   IV fl.  ..            PROCEDURES.  .. 12/5/2022 trach  .. 12/5/2022 peg   .. 12/12 r arm midline     ABGS.  12/3/2022 ac 14/450/.3/5 750/46/75      VS/ PO/IO/ VENT/ DRIPS.   12/18/2022 99f 59 100/60   12/18/2022 5a ac 14/450/5/.3     MAIN ISSUES.  PMH CAD s/p PCI with stent 2015 and CABG 2014,   PMH  s/p medtronic PPM,   PMH CVA (lacunar infarct)  1) PEA arrest with ROSC after approximately 5 min Now communicative   2) DVT 12/12 l Subclav thromS 12/15/2022 lvnx 80.2  3) TRACH 12/5/2022 trach  4) PEG12/5/2022 peg   5) Cellulitis hand absc 12/13 mod enterococcus fecalis  staph epi 12/15 vanco 12/16 zosyn  6) Vent weaning       PROBLEM ASSESSMENT RECOMMENDATIONS..  Sp  cac 11/14/2022   .. Patient is interactive and answers questions appropriately as dw son on 12/13/2022   VTE.  .. 12/12/2022 Pt found to have l subclav dvt    12/12/2022 iv ufh startd   .. 12/15/2022 changed to lvnx   Trach 12/5   .. trach care   vent management.  .. vent bundle dsbt dsv target pplat 30 (-) PO2 60 (+) pH 730 (+)   weaming.  .. 12/13-12/14-12/16/2022 tolerated cpap 12/13-12/14-12/16/20 rsbi 78-94-71  .. 12/15/2022 tolerated cpap 4 h  .. 12/18/2022 dw rt Pt not tolerating cpap gets agitated after few min on 12/17    Infection.  ..  Monitor for vap    cellulitis hand   .. w 12/16-12/18/2022 w 6 - 7   .. bc 12/15 (-)   .. absc 12/13 mod enterococcus fecalis  staph epi   .. 12/12-12/15/2022  5 d course cephalexin for cellulitis  .. 12/15/2022 vanco 1250x2 Dr VICTOR    .. 12/16/2022 zosyn   Vanco level.  .. 12/18/2022 vanco 18   CAD.  .. Monitor   CHF.   .. 11/14/2022 echo mod decr segmental lvsf apical lateral apiccal ant segment are abn takotsubo cmpthy pasp 42 .  .. monitor for chf   anemia.  .. Hb 12/16-12/17-12/18/2022 Hb 8.9 - 9.5- 8.9    .. Monitor  Target Hb 7 (+)   DECUB.  .. 12/2 collagenase   SEIZURES.  .. sz meds as guided by neuro   NONVIOLENT NONSELF DESTRUCTIVE LEVEL 1   .. 12/13/2022       TIME SPENT   Over 25 minutes aggregate care time spent on encounter; activities included   direct patient care, counseling and/or coordinating care reviewing notes, lab data/ imaging , discussion with multidisciplinary team/ patient  /family and explaining in detail risks, benefits, alternatives  of the recommendations     MARQUISE TAYLOR 72 m 11/14/2022 1948 Dr Leela Dowling

## 2022-12-18 NOTE — PROGRESS NOTE ADULT - ASSESSMENT
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 Unknown how long pt unresponsive for at home. Blood sugar in the 40s with EMS s/p glucagon. On arrival to ER pt hypothermic and agonally breathing. Pt became unresponsive with loss of pulse; ACLS initiated. PEA arrest with ROSC after approximately 5 min s/p Epi x2.  Pt intubated during CODE BLUE. Per son pt on the day prior to presentation reported low blood sugar reads on his glucometer in the range of 80s. Son believes pt continued to take his insulin and oral diabetic regimen. Son states that pt was eating but may have been eating less in the last few days. Later on pt noted to have tonic-clonic Sz.     PEA arrest  s/p Epi x 2. ROSC achieved  Takotsubo cardiomyopathy found on Echo   stable  12/13--cardio eval appreciated, further management can be done as outpatient     Acute hypoxemic respiratory failure now chronic  s/p intubation , failed extubation, now trach to vent  c/w vent managment  pulm consulted  daily SBT ;12/13  SBT done, tolerated for 7 hours, placed back on full support due to frequent apnea episodes  12/12 trach sutures removed by surgery         LUE VTE with cellulitis /infected left hand hematoma s/p bedside debridement 12/13   LUE edema and wound of left hand ; good radial pulse   --LUE venous duplex : occlusive thrombus in the left mid subclavian vein with partial   slow flow detected in the lateral subclavian vein.  --LUE arterial Duplex neg   --started heparin drip,  --vascular following   --12/13 Hand surgery consult appreciated, s/p bedside debridement. Dr. Zuñiga would like patient to be on Ancef (d/c keflex)   --ID consult appreciated  --follow cultures , on vancomycin-- growing enterococcus     Sacral Ulcer  -on zosyn  -discussed with vascular they are aware, patient will likely need debridement     New onset Seizure likely due to anoxic brain injury  discussed with Dr. Loo, patient will be only on Keppra 1500mg bid   off vimpat and Depakote   monitor     ELMER 2 to ATN; resolved    Shock liver.  due to PEA arrest. resolved    DM2   A1c 8.8%  continue with current management   FS goal 140-180   continue with PEG feeds     Hypophosphatemia --repleted     unstageable sacral pressure wound   12/13 PT wound consult , vascular consult for possible debridement     Normocytic anemia   no e/o bleeding or bruising   H/H stable   low normal Vit B12 and folic acid --supplement       Preventative measures   heparin gtt --dvt ppx  fall, aspiration  precautions   HOBE     Dispo: patient is undocumented, will need PRUCOL

## 2022-12-18 NOTE — PROGRESS NOTE ADULT - SUBJECTIVE AND OBJECTIVE BOX
BRANDON CAMARILLO    [address] [address]    Allergies    No Known Allergies    Intolerances        PAST MEDICAL & SURGICAL HISTORY:  HTN (hypertension)      HLD (hyperlipidemia)      Diabetes mellitus      Lacunar infarction      BPH (benign prostatic hyperplasia)      CHF (congestive heart failure)      Chronic obstructive pulmonary disease, unspecified COPD type      S/P CABG (coronary artery bypass graft)  2014          FAMILY HISTORY:      Home Medications:  aspirin 81 mg oral delayed release tablet: 1 tab(s) orally once a day (12 Jun 2020 17:35)  Liquifilm Tears preserved ophthalmic solution: 1 drop(s) to each affected eye 2 times a day (12 Jun 2020 17:35)      MEDICATIONS  (STANDING):  aspirin  chewable 81 milliGRAM(s) Oral daily  atorvastatin 20 milliGRAM(s) Oral at bedtime  bacitracin   Ointment 1 Application(s) Topical two times a day  chlorhexidine 0.12% Liquid 15 milliLiter(s) Oral Mucosa every 12 hours  chlorhexidine 2% Cloths 1 Application(s) Topical <User Schedule>  collagenase Ointment 1 Application(s) Topical two times a day  cyanocobalamin 1000 MICROGram(s) Oral daily  enoxaparin Injectable 80 milliGRAM(s) SubCutaneous every 12 hours  folic acid 1 milliGRAM(s) Oral daily  insulin glargine Injectable (LANTUS) 20 Unit(s) SubCutaneous every morning  insulin lispro (ADMELOG) corrective regimen sliding scale   SubCutaneous every 6 hours  levETIRAcetam 1500 milliGRAM(s) Oral two times a day  metoprolol tartrate 25 milliGRAM(s) Oral two times a day  piperacillin/tazobactam IVPB.. 3.375 Gram(s) IV Intermittent every 8 hours  polyethylene glycol 3350 17 Gram(s) Oral daily  senna 2 Tablet(s) Oral at bedtime  silver sulfADIAZINE 1% Cream 1 Application(s) Topical two times a day  valproic  acid Syrup 500 milliGRAM(s) Enteral Tube every 8 hours  vancomycin  IVPB 1250 milliGRAM(s) IV Intermittent every 12 hours    MEDICATIONS  (PRN):  acetaminophen     Tablet .. 650 milliGRAM(s) Oral every 6 hours PRN Temp greater or equal to 38C (100.4F), Mild Pain (1 - 3)  aluminum hydroxide/magnesium hydroxide/simethicone Suspension 30 milliLiter(s) Oral every 4 hours PRN Dyspepsia      Diet, NPO with Tube Feed:   Tube Feeding Modality: Gastrostomy  Glucerna 1.2 Pedrito  Total Volume for 24 Hours (mL): 1440  Continuous  Starting Tube Feed Rate mL per Hour: 50  Increase Tube Feed Rate by (mL): 5     Every 8 hours  Until Goal Tube Feed Rate (mL per Hour): 60  Tube Feed Duration (in Hours): 24  Tube Feed Start Time: 13:00 (12-15-22 @ 12:26) [Active]          Vital Signs Last 24 Hrs  T(C): 37.3 (18 Dec 2022 04:40), Max: 37.3 (17 Dec 2022 10:36)  T(F): 99.2 (18 Dec 2022 04:40), Max: 99.2 (18 Dec 2022 04:40)  HR: 92 (18 Dec 2022 05:30) (64 - 96)  BP: 135/72 (18 Dec 2022 04:40) (100/61 - 135/72)  BP(mean): --  RR: 18 (18 Dec 2022 04:40) (18 - 20)  SpO2: 99% (18 Dec 2022 05:30) (98% - 100%)    Parameters below as of 18 Dec 2022 04:40  Patient On (Oxygen Delivery Method): tracheostomy collar            Mode: AC/ CMV (Assist Control/ Continuous Mandatory Ventilation), RR (machine): 14, TV (machine): 450, FiO2: 30, PEEP: 5, ITime: 1, MAP: 9, PIP: 24      LABS:                        8.9    7.00  )-----------( 241      ( 18 Dec 2022 06:17 )             28.0     12-18    137  |  99  |  21  ----------------------------<  192<H>  4.0   |  34<H>  |  0.78    Ca    8.9      18 Dec 2022 06:17                WBC:  WBC Count: 7.00 K/uL (12-18 @ 06:17)  WBC Count: 8.97 K/uL (12-17 @ 07:00)  WBC Count: 6.17 K/uL (12-16 @ 07:38)  WBC Count: 6.98 K/uL (12-15 @ 08:33)  WBC Count: 7.81 K/uL (12-14 @ 10:15)      MICROBIOLOGY:  RECENT CULTURES:  12-15 .Blood Blood-Peripheral XXXX XXXX   No growth to date.    12-13 .Abscess left hand Enterococcus faecalis XXXX   Moderate Enterococcus faecalis  Rare Staphylococcus epidermidis "Susceptibilities not performed"                    Sodium:  Sodium, Serum: 137 mmol/L (12-18 @ 06:17)  Sodium, Serum: 139 mmol/L (12-16 @ 07:38)      0.78 mg/dL 12-18 @ 06:17  0.76 mg/dL 12-16 @ 07:38      Hemoglobin:  Hemoglobin: 8.9 g/dL (12-18 @ 06:17)  Hemoglobin: 9.5 g/dL (12-17 @ 07:00)  Hemoglobin: 8.9 g/dL (12-16 @ 07:38)  Hemoglobin: 8.8 g/dL (12-15 @ 08:33)  Hemoglobin: 9.4 g/dL (12-14 @ 10:15)      Platelets: Platelet Count - Automated: 241 K/uL (12-18 @ 06:17)  Platelet Count - Automated: 202 K/uL (12-17 @ 07:00)  Platelet Count - Automated: 211 K/uL (12-16 @ 07:38)  Platelet Count - Automated: 187 K/uL (12-15 @ 08:33)  Platelet Count - Automated: 210 K/uL (12-14 @ 10:15)              RADIOLOGY & ADDITIONAL STUDIES:      MICROBIOLOGY:  RECENT CULTURES:  12-15 .Blood Blood-Peripheral XXXX XXXX   No growth to date.    12-13 .Abscess left hand Enterococcus faecalis XXXX   Moderate Enterococcus faecalis  Rare Staphylococcus epidermidis "Susceptibilities not performed"

## 2022-12-18 NOTE — PROGRESS NOTE ADULT - SUBJECTIVE AND OBJECTIVE BOX
Patient is a 72y old  Male who presents with a chief complaint of s/p cardiac arrest, hypoglycemia (18 Dec 2022 08:42)    INTERVAL HPI/OVERNIGHT EVENTS: no events    MEDICATIONS  (STANDING):  aspirin  chewable 81 milliGRAM(s) Oral daily  atorvastatin 20 milliGRAM(s) Oral at bedtime  bacitracin   Ointment 1 Application(s) Topical two times a day  chlorhexidine 0.12% Liquid 15 milliLiter(s) Oral Mucosa every 12 hours  chlorhexidine 2% Cloths 1 Application(s) Topical <User Schedule>  collagenase Ointment 1 Application(s) Topical two times a day  cyanocobalamin 1000 MICROGram(s) Oral daily  enoxaparin Injectable 80 milliGRAM(s) SubCutaneous every 12 hours  folic acid 1 milliGRAM(s) Oral daily  insulin glargine Injectable (LANTUS) 20 Unit(s) SubCutaneous every morning  insulin lispro (ADMELOG) corrective regimen sliding scale   SubCutaneous every 6 hours  levETIRAcetam 1500 milliGRAM(s) Oral two times a day  metoprolol tartrate 25 milliGRAM(s) Oral two times a day  piperacillin/tazobactam IVPB.. 3.375 Gram(s) IV Intermittent every 8 hours  polyethylene glycol 3350 17 Gram(s) Oral daily  senna 2 Tablet(s) Oral at bedtime  silver sulfADIAZINE 1% Cream 1 Application(s) Topical two times a day  valproic  acid Syrup 500 milliGRAM(s) Enteral Tube every 8 hours  vancomycin  IVPB 1250 milliGRAM(s) IV Intermittent every 12 hours    MEDICATIONS  (PRN):  acetaminophen     Tablet .. 650 milliGRAM(s) Oral every 6 hours PRN Temp greater or equal to 38C (100.4F), Mild Pain (1 - 3)  aluminum hydroxide/magnesium hydroxide/simethicone Suspension 30 milliLiter(s) Oral every 4 hours PRN Dyspepsia    Allergies    No Known Allergies    Intolerances      REVIEW OF SYSTEMS:  All other systems reviewed and are negative    Vital Signs Last 24 Hrs  T(C): 37 (18 Dec 2022 10:23), Max: 37.3 (18 Dec 2022 04:40)  T(F): 98.6 (18 Dec 2022 10:23), Max: 99.2 (18 Dec 2022 04:40)  HR: 82 (18 Dec 2022 10:23) (64 - 96)  BP: 130/67 (18 Dec 2022 10:23) (100/61 - 135/72)  BP(mean): --  RR: 18 (18 Dec 2022 10:23) (18 - 18)  SpO2: 97% (18 Dec 2022 10:23) (97% - 100%)    Parameters below as of 18 Dec 2022 10:23  Patient On (Oxygen Delivery Method): tracheostomy collar      Daily     Daily Weight in k (18 Dec 2022 04:40)  I&O's Summary    CAPILLARY BLOOD GLUCOSE      POCT Blood Glucose.: 145 mg/dL (18 Dec 2022 08:26)  POCT Blood Glucose.: 214 mg/dL (18 Dec 2022 05:31)  POCT Blood Glucose.: 183 mg/dL (18 Dec 2022 00:40)  POCT Blood Glucose.: 132 mg/dL (17 Dec 2022 17:29)  POCT Blood Glucose.: 180 mg/dL (17 Dec 2022 11:55)    PHYSICAL EXAM:  GENERAL: NAD,  on trach to vent   HEAD:  Atraumatic, Normocephalic  EYES: EOMI, PERRLA, conjunctiva and sclera clear  ENMT: No tonsillar erythema, exudates, or enlargement; Moist mucous membranes   NECK: Supple, No JVD, +TRACH  NERVOUS SYSTEM:  awake and alert,  moving extremities spontaneously   CHEST/LUNG: CTAB;  No rales, rhonchi, wheezing, or rubs  HEART: Regular rate and rhythm; No murmurs, rubs, or gallops  ABDOMEN: Soft, Nontender, Nondistended; Bowel sounds present; +PEG   EXTREMITIES:   left hand dressing cdi     Labs                          8.9    7.00  )-----------( 241      ( 18 Dec 2022 06:17 )             28.0         137  |  99  |  21  ----------------------------<  192<H>  4.0   |  34<H>  |  0.78    Ca    8.9      18 Dec 2022 06:17                          DVT prophylaxis: > Lovenox 40mg SQ daily  > Heparin   > SCD's

## 2022-12-19 ENCOUNTER — RESULT REVIEW (OUTPATIENT)
Age: 72
End: 2022-12-19

## 2022-12-19 LAB
GLUCOSE BLDC GLUCOMTR-MCNC: 107 MG/DL — HIGH (ref 70–99)
GLUCOSE BLDC GLUCOMTR-MCNC: 114 MG/DL — HIGH (ref 70–99)
GLUCOSE BLDC GLUCOMTR-MCNC: 161 MG/DL — HIGH (ref 70–99)
GLUCOSE BLDC GLUCOMTR-MCNC: 170 MG/DL — HIGH (ref 70–99)
GLUCOSE BLDC GLUCOMTR-MCNC: 175 MG/DL — HIGH (ref 70–99)
GLUCOSE BLDC GLUCOMTR-MCNC: 222 MG/DL — HIGH (ref 70–99)
HCT VFR BLD CALC: 30.4 % — LOW (ref 39–50)
HGB BLD-MCNC: 9.2 G/DL — LOW (ref 13–17)
MCHC RBC-ENTMCNC: 29.9 PG — SIGNIFICANT CHANGE UP (ref 27–34)
MCHC RBC-ENTMCNC: 30.3 G/DL — LOW (ref 32–36)
MCV RBC AUTO: 98.7 FL — SIGNIFICANT CHANGE UP (ref 80–100)
NRBC # BLD: 0 /100 WBCS — SIGNIFICANT CHANGE UP (ref 0–0)
PLATELET # BLD AUTO: 239 K/UL — SIGNIFICANT CHANGE UP (ref 150–400)
RBC # BLD: 3.08 M/UL — LOW (ref 4.2–5.8)
RBC # FLD: 14.8 % — HIGH (ref 10.3–14.5)
WBC # BLD: 8 K/UL — SIGNIFICANT CHANGE UP (ref 3.8–10.5)
WBC # FLD AUTO: 8 K/UL — SIGNIFICANT CHANGE UP (ref 3.8–10.5)

## 2022-12-19 PROCEDURE — 11042 DBRDMT SUBQ TIS 1ST 20SQCM/<: CPT

## 2022-12-19 PROCEDURE — 99232 SBSQ HOSP IP/OBS MODERATE 35: CPT

## 2022-12-19 PROCEDURE — 88304 TISSUE EXAM BY PATHOLOGIST: CPT | Mod: 26

## 2022-12-19 RX ADMIN — PIPERACILLIN AND TAZOBACTAM 25 GRAM(S): 4; .5 INJECTION, POWDER, LYOPHILIZED, FOR SOLUTION INTRAVENOUS at 05:57

## 2022-12-19 RX ADMIN — PIPERACILLIN AND TAZOBACTAM 25 GRAM(S): 4; .5 INJECTION, POWDER, LYOPHILIZED, FOR SOLUTION INTRAVENOUS at 22:52

## 2022-12-19 RX ADMIN — LEVETIRACETAM 1500 MILLIGRAM(S): 250 TABLET, FILM COATED ORAL at 05:32

## 2022-12-19 RX ADMIN — Medication 81 MILLIGRAM(S): at 13:26

## 2022-12-19 RX ADMIN — Medication 2: at 14:38

## 2022-12-19 RX ADMIN — Medication 500 MILLIGRAM(S): at 13:32

## 2022-12-19 RX ADMIN — ENOXAPARIN SODIUM 80 MILLIGRAM(S): 100 INJECTION SUBCUTANEOUS at 18:19

## 2022-12-19 RX ADMIN — SENNA PLUS 2 TABLET(S): 8.6 TABLET ORAL at 22:52

## 2022-12-19 RX ADMIN — CHLORHEXIDINE GLUCONATE 1 APPLICATION(S): 213 SOLUTION TOPICAL at 05:42

## 2022-12-19 RX ADMIN — Medication 500 MILLIGRAM(S): at 05:32

## 2022-12-19 RX ADMIN — INSULIN GLARGINE 20 UNIT(S): 100 INJECTION, SOLUTION SUBCUTANEOUS at 09:57

## 2022-12-19 RX ADMIN — Medication 1 APPLICATION(S): at 22:00

## 2022-12-19 RX ADMIN — Medication 166.67 MILLIGRAM(S): at 22:51

## 2022-12-19 RX ADMIN — Medication 166.67 MILLIGRAM(S): at 05:40

## 2022-12-19 RX ADMIN — Medication 25 MILLIGRAM(S): at 18:21

## 2022-12-19 RX ADMIN — Medication 2: at 06:28

## 2022-12-19 RX ADMIN — Medication 1 APPLICATION(S): at 13:26

## 2022-12-19 RX ADMIN — ENOXAPARIN SODIUM 80 MILLIGRAM(S): 100 INJECTION SUBCUTANEOUS at 05:33

## 2022-12-19 RX ADMIN — CHLORHEXIDINE GLUCONATE 15 MILLILITER(S): 213 SOLUTION TOPICAL at 05:32

## 2022-12-19 RX ADMIN — Medication 500 MILLIGRAM(S): at 22:53

## 2022-12-19 RX ADMIN — Medication 25 MILLIGRAM(S): at 05:32

## 2022-12-19 RX ADMIN — Medication 1 APPLICATION(S): at 05:57

## 2022-12-19 RX ADMIN — Medication 0: at 18:20

## 2022-12-19 RX ADMIN — Medication 1 APPLICATION(S): at 13:31

## 2022-12-19 RX ADMIN — Medication 1 APPLICATION(S): at 05:33

## 2022-12-19 RX ADMIN — PREGABALIN 1000 MICROGRAM(S): 225 CAPSULE ORAL at 13:30

## 2022-12-19 RX ADMIN — POLYETHYLENE GLYCOL 3350 17 GRAM(S): 17 POWDER, FOR SOLUTION ORAL at 13:32

## 2022-12-19 RX ADMIN — Medication 1 MILLIGRAM(S): at 13:31

## 2022-12-19 RX ADMIN — Medication 1 APPLICATION(S): at 05:32

## 2022-12-19 RX ADMIN — ATORVASTATIN CALCIUM 20 MILLIGRAM(S): 80 TABLET, FILM COATED ORAL at 22:53

## 2022-12-19 RX ADMIN — LEVETIRACETAM 1500 MILLIGRAM(S): 250 TABLET, FILM COATED ORAL at 18:20

## 2022-12-19 RX ADMIN — CHLORHEXIDINE GLUCONATE 15 MILLILITER(S): 213 SOLUTION TOPICAL at 13:27

## 2022-12-19 RX ADMIN — PIPERACILLIN AND TAZOBACTAM 25 GRAM(S): 4; .5 INJECTION, POWDER, LYOPHILIZED, FOR SOLUTION INTRAVENOUS at 13:32

## 2022-12-19 NOTE — PROGRESS NOTE ADULT - ASSESSMENT
REVIEW OF SYMPTOMS      Able to give (reliable) ROS  NO     PHYSICAL EXAM    HEENT Unremarkable  atraumatic   RESP Fair air entry EXP prolonged    Harsh breath sound Resp distres mild   CARDIAC S1 S2 No S3     NO JVD    ABDOMEN SOFT BS PRESENT NOT DISTENDED No hepatosplenomegaly   PEDAL EDEMA present No calf tenderness  NO rash       GENERAL DATA .   GOC.  12/11/2022 full code       ALLGY.  nka                            WT. ..  12/2/2022 86  BMI. ..    12/2/2022 29                          ICU STAY.  .. 11/29-12/9  COVID.   .. 12/2/2022 scv2 (-)   ..  11/20/2022 scv2 (-)   BEST PRACTICE ISSUES.    HOB ELEVATN. Yes  DVT PPLX.   12/15/2022 lvnx 80.2 ( l sc thrombus)    ALEJO PPLX.   ..      INFN PPLX.   ..  11/25 chlorhex .12%   .. 11/14 chlorhex 2%   SP SW ANTWAN.         DIET.    ..  12/8 vital 1.2 1200 gt   IV fl.  ..            PROCEDURES.  .. 12/5/2022 trach  .. 12/5/2022 peg     ABGS.  12/3/2022 ac 14/450/.3/5 750/46/75      VS/ PO/IO/ VENT/ DRIPS.   12/19/2022 100f 80 140/70   12/19/2022 ac 14/450/5/.3     MAIN ISSUES.  PMH CAD s/p PCI with stent 2015 and CABG 2014,   PMH  s/p medtronic PPM,   PMH CVA (lacunar infarct)  1) PEA arrest with ROSC after approximately 5 min Now communicative   2) DVT 12/12 l Subclav thromS 12/15/2022 lvnx 80.2  3) TRACH 12/5/2022 trach  4) PEG12/5/2022 peg   5) Cellulitis hand absc 12/13 mod enterococcus fecalis  staph epi 12/15 vanco 12/16 zosyn  6) Vent weaning       PROBLEM ASSESSMENT RECOMMENDATIONS..  Sp  cac 11/14/2022   .. Patient is interactive and answers questions appropriately as dw son on 12/13/2022   VTE.  .. 12/12/2022 Pt found to have l subclav dvt    12/12/2022 iv ufh startd   .. 12/15/2022 changed to lvnx   Trach 12/5   .. trach care   vent management.  .. vent bundle dsbt dsv target pplat 30 (-) PO2 60 (+) pH 730 (+)   weaming.  .. 12/13-12/14-12/16/2022 tolerated cpap 12/13-12/14-12/16/20 rsbi 78-94-71  .. 12/15/2022 tolerated cpap 4 h  .. 12/18/2022 dw rt Pt not tolerating cpap gets agitated after few min on 12/17    Infection.  ..  Monitor for vap    cellulitis hand   .. w 12/16-12/18-12/19/2022 w 6 - 7 - 8   .. bc 12/15 (-)   .. absc 12/13 mod enterococcus fecalis  staph epi   .. 12/12-12/15/2022  5 d course cephalexin for cellulitis  .. 12/15/2022 vanco 1250x2 Dr VALENTE Matthews. 12/16/2022 zosyn   Vanco level.  .. 12/18/2022 vanco 18   CAD.  .. Monitor   CHF.   .. 11/14/2022 echo mod decr segmental lvsf apical lateral apiccal ant segment are abn takotsubo cmpthy pasp 42 .  .. monitor for chf   anemia.  .. Hb 12/16-12/17-12/18-12/19/2022 Hb 8.9 - 9.5- 8.9  - 9.2   .. Monitor  Target Hb 7 (+)   DECUB.  .. 12/2 collagenase   SEIZURES.  .. sz meds as guided by neuro     TIME SPENT   Over 25 minutes aggregate care time spent on encounter; activities included   direct patient care, counseling and/or coordinating care reviewing notes, lab data/ imaging , discussion with multidisciplinary team/ patient  /family and explaining in detail risks, benefits, alternatives  of the recommendations     MARQUISE TAYLOR 72 m 11/14/2022 1948 Dr Leela Dowling

## 2022-12-19 NOTE — PROGRESS NOTE ADULT - ASSESSMENT
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 Unknown how long pt unresponsive for at home. Blood sugar in the 40s with EMS s/p glucagon. On arrival to ER pt hypothermic and agonally breathing. Pt became unresponsive with loss of pulse; ACLS initiated. PEA arrest with ROSC after approximately 5 min s/p Epi x2.  Pt intubated during CODE BLUE. Per son pt on the day prior to presentation reported low blood sugar reads on his glucometer in the range of 80s. Son believes pt continued to take his insulin and oral diabetic regimen. Son states that pt was eating but may have been eating less in the last few days. Later on pt noted to have tonic-clonic Sz.     PEA arrest  s/p Epi x 2. ROSC achieved  Takotsubo cardiomyopathy found on Echo   stable  12/13--cardio eval appreciated, further management can be done as outpatient     Acute hypoxemic respiratory failure now chronic  s/p intubation , failed extubation, now trach to vent  c/w vent managment  pulm consulted  daily SBT ;12/13  SBT done, tolerated for 7 hours, placed back on full support due to frequent apnea episodes  12/12 trach sutures removed by surgery         LUE VTE with cellulitis /infected left hand hematoma s/p bedside debridement 12/13   LUE edema and wound of left hand ; good radial pulse   --LUE venous duplex : occlusive thrombus in the left mid subclavian vein with partial   slow flow detected in the lateral subclavian vein.  --LUE arterial Duplex neg   --started heparin drip,  --vascular following   --12/13 Hand surgery consult appreciated, s/p bedside debridement. Dr. Zuñiga would like patient to be on Ancef (d/c keflex)   --ID consult appreciated  --follow cultures , on vancomycin-- growing enterococcus     Sacral Ulcer  -on zosyn  -s/p debridement on 12/19    New onset Seizure likely due to anoxic brain injury  discussed with Dr. Loo, patient will be only on Keppra 1500mg bid   off vimpat and Depakote   monitor     ELMER 2 to ATN; resolved    Shock liver.  due to PEA arrest. resolved    DM2   A1c 8.8%  continue with current management   FS goal 140-180   continue with PEG feeds     Hypophosphatemia --repleted     unstageable sacral pressure wound   12/13 PT wound consult , vascular consult for possible debridement     Normocytic anemia   no e/o bleeding or bruising   H/H stable   low normal Vit B12 and folic acid --supplement       Preventative measures   heparin gtt --dvt ppx  fall, aspiration  precautions   HOBE     Dispo: patient is undocumented, will need PRUCOL

## 2022-12-19 NOTE — PROGRESS NOTE ADULT - SUBJECTIVE AND OBJECTIVE BOX
Patient is a 72y old  Male who presents with a chief complaint of s/p cardiac arrest, hypoglycemia (19 Dec 2022 08:58)    INTERVAL HPI/OVERNIGHT EVENTS: no events     MEDICATIONS  (STANDING):  aspirin  chewable 81 milliGRAM(s) Oral daily  atorvastatin 20 milliGRAM(s) Oral at bedtime  bacitracin   Ointment 1 Application(s) Topical two times a day  chlorhexidine 0.12% Liquid 15 milliLiter(s) Oral Mucosa every 12 hours  chlorhexidine 2% Cloths 1 Application(s) Topical <User Schedule>  collagenase Ointment 1 Application(s) Topical two times a day  cyanocobalamin 1000 MICROGram(s) Oral daily  enoxaparin Injectable 80 milliGRAM(s) SubCutaneous every 12 hours  folic acid 1 milliGRAM(s) Oral daily  insulin glargine Injectable (LANTUS) 20 Unit(s) SubCutaneous every morning  insulin lispro (ADMELOG) corrective regimen sliding scale   SubCutaneous every 6 hours  levETIRAcetam 1500 milliGRAM(s) Oral two times a day  metoprolol tartrate 25 milliGRAM(s) Oral two times a day  piperacillin/tazobactam IVPB.. 3.375 Gram(s) IV Intermittent every 8 hours  polyethylene glycol 3350 17 Gram(s) Oral daily  senna 2 Tablet(s) Oral at bedtime  silver sulfADIAZINE 1% Cream 1 Application(s) Topical two times a day  valproic  acid Syrup 500 milliGRAM(s) Enteral Tube every 8 hours  vancomycin  IVPB 1250 milliGRAM(s) IV Intermittent every 12 hours    MEDICATIONS  (PRN):  acetaminophen     Tablet .. 650 milliGRAM(s) Oral every 6 hours PRN Temp greater or equal to 38C (100.4F), Mild Pain (1 - 3)  aluminum hydroxide/magnesium hydroxide/simethicone Suspension 30 milliLiter(s) Oral every 4 hours PRN Dyspepsia    Allergies    No Known Allergies    Intolerances      REVIEW OF SYSTEMS:  All other systems reviewed and are negative    Vital Signs Last 24 Hrs  T(C): 37.8 (19 Dec 2022 11:43), Max: 37.8 (19 Dec 2022 11:43)  T(F): 100.1 (19 Dec 2022 11:43), Max: 100.1 (19 Dec 2022 11:43)  HR: 79 (19 Dec 2022 12:09) (59 - 94)  BP: 148/76 (19 Dec 2022 11:43) (101/60 - 148/76)  BP(mean): --  RR: 20 (19 Dec 2022 11:43) (18 - 20)  SpO2: 99% (19 Dec 2022 12:09) (98% - 100%)    Parameters below as of 19 Dec 2022 04:30  Patient On (Oxygen Delivery Method): ventilator      Daily     Daily Weight in k.8 (19 Dec 2022 04:30)  I&O's Summary    CAPILLARY BLOOD GLUCOSE      POCT Blood Glucose.: 222 mg/dL (19 Dec 2022 08:13)  POCT Blood Glucose.: 175 mg/dL (19 Dec 2022 05:47)  POCT Blood Glucose.: 107 mg/dL (19 Dec 2022 00:44)  POCT Blood Glucose.: 181 mg/dL (18 Dec 2022 16:30)    PHYSICAL EXAM:  GENERAL: NAD,  on trach to vent   HEAD:  Atraumatic, Normocephalic  EYES: EOMI, PERRLA, conjunctiva and sclera clear  ENMT: No tonsillar erythema, exudates, or enlargement; Moist mucous membranes   NECK: Supple, No JVD, +TRACH  NERVOUS SYSTEM:  awake and alert,  moving extremities spontaneously   CHEST/LUNG: CTAB;  No rales, rhonchi, wheezing, or rubs  HEART: Regular rate and rhythm; No murmurs, rubs, or gallops  ABDOMEN: Soft, Nontender, Nondistended; Bowel sounds present; +PEG   EXTREMITIES:   left hand dressing cdi       Labs                          9.2    8.00  )-----------( 239      ( 19 Dec 2022 07:17 )             30.4     12-18    137  |  99  |  21  ----------------------------<  192<H>  4.0   |  34<H>  |  0.78    Ca    8.9      18 Dec 2022 06:17                          DVT prophylaxis: > Lovenox 40mg SQ daily  > Heparin   > SCD's

## 2022-12-19 NOTE — PROGRESS NOTE ADULT - ASSESSMENT
Status post incision and drainage of the left hand.  Change at this juncture appear to be mostly related to the DVT more than anything else I expect.  Unclear if the hematoma drained was secondarily infected, or not.  Culture is pending.  No leukocytosis.  He has had intermittent low-grade fevers through the beginning of this month, with higher grade fevers before that.    12/14: low grade temp last night and today around 16:00, no leukocytosis, left hand s/p I&D - culture is pending, cefazolin IV continued.   12/15: no fevers today thus far, no wbc, hand wound culture grew enterococcus, changing abx to IV vancomycin. Pt's wound with no pus, no malodor, seems not tender, no significant swelling or erythema of surrounding tissues, normal temperature. Surveillance BCs x 2 were collected.   12/16: low grade temp last night, no leukocytosis, cr ok, hand wound continues to improve. Pt's back was assessed with wound care / PT, possibly will need to be debrided, recommended vascular consult. Will add Zosyn to the regimen for now, Vancomycin IV continued, awaiting for BCs.   12/19: no longer spiking fevers, no WBC, BCs with no growth to date, now s/p sacral wound debridement, will stop abx.     Suggestions:  stop vancomycin and zosyn after today's doses   follow surveillance BCs x 2 - NGTD  follow all culture data  trend pt's temperatures  monitor cbc  monitor pt's respiratory status  vascular evaluation and today's debridement of the wound is appreciated     Discussed with Dr. Chambers   Discussed with pt's son   Discussed with Dr. Gonsalez

## 2022-12-19 NOTE — PROGRESS NOTE ADULT - SUBJECTIVE AND OBJECTIVE BOX
BRANDON CAMARILLO    [address]    Allergies    No Known Allergies    Intolerances        PAST MEDICAL & SURGICAL HISTORY:  HTN (hypertension)      HLD (hyperlipidemia)      Diabetes mellitus      Lacunar infarction      BPH (benign prostatic hyperplasia)      CHF (congestive heart failure)      Chronic obstructive pulmonary disease, unspecified COPD type      S/P CABG (coronary artery bypass graft)  2014          FAMILY HISTORY:      Home Medications:  aspirin 81 mg oral delayed release tablet: 1 tab(s) orally once a day (12 Jun 2020 17:35)  Liquifilm Tears preserved ophthalmic solution: 1 drop(s) to each affected eye 2 times a day (12 Jun 2020 17:35)      MEDICATIONS  (STANDING):  aspirin  chewable 81 milliGRAM(s) Oral daily  atorvastatin 20 milliGRAM(s) Oral at bedtime  bacitracin   Ointment 1 Application(s) Topical two times a day  chlorhexidine 0.12% Liquid 15 milliLiter(s) Oral Mucosa every 12 hours  chlorhexidine 2% Cloths 1 Application(s) Topical <User Schedule>  collagenase Ointment 1 Application(s) Topical two times a day  cyanocobalamin 1000 MICROGram(s) Oral daily  enoxaparin Injectable 80 milliGRAM(s) SubCutaneous every 12 hours  folic acid 1 milliGRAM(s) Oral daily  insulin glargine Injectable (LANTUS) 20 Unit(s) SubCutaneous every morning  insulin lispro (ADMELOG) corrective regimen sliding scale   SubCutaneous every 6 hours  levETIRAcetam 1500 milliGRAM(s) Oral two times a day  metoprolol tartrate 25 milliGRAM(s) Oral two times a day  piperacillin/tazobactam IVPB.. 3.375 Gram(s) IV Intermittent every 8 hours  polyethylene glycol 3350 17 Gram(s) Oral daily  senna 2 Tablet(s) Oral at bedtime  silver sulfADIAZINE 1% Cream 1 Application(s) Topical two times a day  valproic  acid Syrup 500 milliGRAM(s) Enteral Tube every 8 hours  vancomycin  IVPB 1250 milliGRAM(s) IV Intermittent every 12 hours    MEDICATIONS  (PRN):  acetaminophen     Tablet .. 650 milliGRAM(s) Oral every 6 hours PRN Temp greater or equal to 38C (100.4F), Mild Pain (1 - 3)  aluminum hydroxide/magnesium hydroxide/simethicone Suspension 30 milliLiter(s) Oral every 4 hours PRN Dyspepsia      Diet, NPO with Tube Feed:   Tube Feeding Modality: Gastrostomy  Glucerna 1.2 Pedrito  Total Volume for 24 Hours (mL): 1440  Continuous  Starting Tube Feed Rate mL per Hour: 50  Increase Tube Feed Rate by (mL): 5     Every 8 hours  Until Goal Tube Feed Rate (mL per Hour): 60  Tube Feed Duration (in Hours): 24  Tube Feed Start Time: 13:00 (12-15-22 @ 12:26) [Active]          Vital Signs Last 24 Hrs  T(C): 37.2 (19 Dec 2022 04:30), Max: 37.7 (19 Dec 2022 00:06)  T(F): 99 (19 Dec 2022 04:30), Max: 99.8 (19 Dec 2022 00:06)  HR: 80 (19 Dec 2022 08:51) (59 - 94)  BP: 143/76 (19 Dec 2022 04:30) (101/60 - 143/76)  BP(mean): --  RR: 19 (19 Dec 2022 04:30) (18 - 20)  SpO2: 100% (19 Dec 2022 08:51) (97% - 100%)    Parameters below as of 19 Dec 2022 04:30  Patient On (Oxygen Delivery Method): ventilator            Mode: CPAP with PS, FiO2: 30, PEEP: 5, PS: 5, ITime: 1, MAP: 7, PIP: 10      LABS:                        9.2    8.00  )-----------( 239      ( 19 Dec 2022 07:17 )             30.4     12-18    137  |  99  |  21  ----------------------------<  192<H>  4.0   |  34<H>  |  0.78    Ca    8.9      18 Dec 2022 06:17                WBC:  WBC Count: 8.00 K/uL (12-19 @ 07:17)  WBC Count: 7.00 K/uL (12-18 @ 06:17)  WBC Count: 8.97 K/uL (12-17 @ 07:00)  WBC Count: 6.17 K/uL (12-16 @ 07:38)      MICROBIOLOGY:  RECENT CULTURES:  12-15 .Blood Blood-Peripheral XXXX XXXX   No growth to date.    12-13 .Abscess left hand Enterococcus faecalis XXXX   Moderate Enterococcus faecalis  Rare Staphylococcus epidermidis "Susceptibilities not performed"                    Sodium:  Sodium, Serum: 137 mmol/L (12-18 @ 06:17)  Sodium, Serum: 139 mmol/L (12-16 @ 07:38)      0.78 mg/dL 12-18 @ 06:17  0.76 mg/dL 12-16 @ 07:38      Hemoglobin:  Hemoglobin: 9.2 g/dL (12-19 @ 07:17)  Hemoglobin: 8.9 g/dL (12-18 @ 06:17)  Hemoglobin: 9.5 g/dL (12-17 @ 07:00)  Hemoglobin: 8.9 g/dL (12-16 @ 07:38)      Platelets: Platelet Count - Automated: 239 K/uL (12-19 @ 07:17)  Platelet Count - Automated: 241 K/uL (12-18 @ 06:17)  Platelet Count - Automated: 202 K/uL (12-17 @ 07:00)  Platelet Count - Automated: 211 K/uL (12-16 @ 07:38)              RADIOLOGY & ADDITIONAL STUDIES:      MICROBIOLOGY:  RECENT CULTURES:  12-15 .Blood Blood-Peripheral XXXX XXXX   No growth to date.    12-13 .Abscess left hand Enterococcus faecalis XXXX   Moderate Enterococcus faecalis  Rare Staphylococcus epidermidis "Susceptibilities not performed"

## 2022-12-19 NOTE — PROCEDURE NOTE - NSINDICATIONS_GEN_A_CORE
antibiotic therapy/fluid administration
devitalized or nonviable tissue at the level of:
venous access
antibiotic therapy/emergency venous access/fluid administration

## 2022-12-19 NOTE — PROGRESS NOTE ADULT - SUBJECTIVE AND OBJECTIVE BOX
BRANDON CAMARILLO  MRN-[id number]    Follow Up:  hand wound, sacral wound, low grade temperatures    Interval History: the pt was seen earlier today, pt's sacral wound was debrided by the vascular team, no fevers since 12/15, no leukocytosis.     PAST MEDICAL & SURGICAL HISTORY:  HTN (hypertension)      HLD (hyperlipidemia)      Diabetes mellitus      Lacunar infarction      BPH (benign prostatic hyperplasia)      CHF (congestive heart failure)      Chronic obstructive pulmonary disease, unspecified COPD type      S/P CABG (coronary artery bypass graft)  2014          ROS:    [x ] Unobtainable because: tracheostomy   [ ] All other systems negative    Constitutional: no fever, no chills  Head: no trauma  Eyes: no vision changes, no eye pain  ENT:  no sore throat, no rhinorrhea  Cardiovascular:  no chest pain, no palpitation  Respiratory:  no SOB, no cough  GI:  no abd pain, no vomiting, no diarrhea  urinary: no dysuria, no hematuria, no flank pain  musculoskeletal:  no joint pain, no joint swelling  skin:  no rash  neurology:  no headache, no seizure, no change in mental status  psych: no anxiety, no depression         Allergies  No Known Allergies        ANTIMICROBIALS:  piperacillin/tazobactam IVPB.. 3.375 every 8 hours  vancomycin  IVPB 1250 every 12 hours      OTHER MEDS:  acetaminophen     Tablet .. 650 milliGRAM(s) Oral every 6 hours PRN  aluminum hydroxide/magnesium hydroxide/simethicone Suspension 30 milliLiter(s) Oral every 4 hours PRN  aspirin  chewable 81 milliGRAM(s) Oral daily  atorvastatin 20 milliGRAM(s) Oral at bedtime  bacitracin   Ointment 1 Application(s) Topical two times a day  chlorhexidine 0.12% Liquid 15 milliLiter(s) Oral Mucosa every 12 hours  chlorhexidine 2% Cloths 1 Application(s) Topical <User Schedule>  collagenase Ointment 1 Application(s) Topical two times a day  cyanocobalamin 1000 MICROGram(s) Oral daily  enoxaparin Injectable 80 milliGRAM(s) SubCutaneous every 12 hours  folic acid 1 milliGRAM(s) Oral daily  insulin glargine Injectable (LANTUS) 20 Unit(s) SubCutaneous every morning  insulin lispro (ADMELOG) corrective regimen sliding scale   SubCutaneous every 6 hours  levETIRAcetam 1500 milliGRAM(s) Oral two times a day  metoprolol tartrate 25 milliGRAM(s) Oral two times a day  polyethylene glycol 3350 17 Gram(s) Oral daily  senna 2 Tablet(s) Oral at bedtime  silver sulfADIAZINE 1% Cream 1 Application(s) Topical two times a day  valproic  acid Syrup 500 milliGRAM(s) Enteral Tube every 8 hours      Vital Signs Last 24 Hrs  T(C): 37.8 (19 Dec 2022 11:43), Max: 37.8 (19 Dec 2022 11:43)  T(F): 100.1 (19 Dec 2022 11:43), Max: 100.1 (19 Dec 2022 11:43)  HR: 60 (19 Dec 2022 14:50) (59 - 88)  BP: 128/73 (19 Dec 2022 14:50) (101/60 - 148/76)  BP(mean): --  RR: 20 (19 Dec 2022 11:43) (18 - 20)  SpO2: 100% (19 Dec 2022 14:50) (98% - 100%)    Parameters below as of 19 Dec 2022 14:50  Patient On (Oxygen Delivery Method): ventilator        Physical Exam:  General: Nontoxic-appearing Male in no acute distress.  HEENT: AT/NC.Anicteric. Conjunctiva pink and moist. Oropharynx unable due to patient compliance.   Neck: Not rigid. No sense of mass. Trach C/D/I  Nodes: None palpable.  Lungs: Diminished BS Bilaterally   Heart: Regular rate and rhythm.   Abdomen: Bowel sounds present and normoactive. Soft. Nondistended. Nontender. No sense of mass. No organomegaly. PEG in place c/d/i  Back: sacral wound debrided   Extremities: No cyanosis or clubbing. LUE - hand is dressed   Skin: Warm. Dry. Good turgor. No rash. No vasculitic stigmata. see above   Psychiatric: Unable to assess     WBC Count: 8.00 K/uL (12-19 @ 07:17)  WBC Count: 7.00 K/uL (12-18 @ 06:17)  WBC Count: 8.97 K/uL (12-17 @ 07:00)  WBC Count: 6.17 K/uL (12-16 @ 07:38)  WBC Count: 6.98 K/uL (12-15 @ 08:33)  WBC Count: 7.81 K/uL (12-14 @ 10:15)  WBC Count: 7.44 K/uL (12-13 @ 04:02)                            9.2    8.00  )-----------( 239      ( 19 Dec 2022 07:17 )             30.4       12-18    137  |  99  |  21  ----------------------------<  192<H>  4.0   |  34<H>  |  0.78    Ca    8.9      18 Dec 2022 06:17            Creatinine Trend: 0.78<--, 0.76<--, 0.62<--, 0.59<--, 0.60<--, 0.60<--      MICROBIOLOGY:  v  .Blood Blood-Peripheral  12-15-22   No growth to date.  --  --      .Abscess left hand  12-13-22   Moderate Enterococcus faecalis  Rare Staphylococcus epidermidis "Susceptibilities not performed"  --  Enterococcus faecalis      Clean Catch Clean Catch (Midstream)  11-26-22   >100,000 CFU/ml Escherichia coli  --  Escherichia coli      ET Tube ET Tube  11-25-22   Normal Respiratory Larissa present  --    Rare polymorphonuclear leukocytes per low power field  Rare Squamous epithelial cells per low power field  Few Gram Negative Rods per oil power field      .Blood Blood-Peripheral  11-25-22   No Growth Final  --  --      Ferritin, Serum: 536 (12-13)    SARS-CoV-2: NotDetec (03 Dec 2022 09:45)  SARS-CoV-2: NotDetec (02 Dec 2022 01:55)  SARS-CoV-2: NotDetec (30 Nov 2022 02:30)  SARS-CoV-2: NotDetec (20 Nov 2022 09:10)    RADIOLOGY:

## 2022-12-20 LAB
CULTURE RESULTS: SIGNIFICANT CHANGE UP
CULTURE RESULTS: SIGNIFICANT CHANGE UP
GLUCOSE BLDC GLUCOMTR-MCNC: 130 MG/DL — HIGH (ref 70–99)
GLUCOSE BLDC GLUCOMTR-MCNC: 145 MG/DL — HIGH (ref 70–99)
GLUCOSE BLDC GLUCOMTR-MCNC: 185 MG/DL — HIGH (ref 70–99)
GLUCOSE BLDC GLUCOMTR-MCNC: 195 MG/DL — HIGH (ref 70–99)
GLUCOSE BLDC GLUCOMTR-MCNC: 195 MG/DL — HIGH (ref 70–99)
GLUCOSE BLDC GLUCOMTR-MCNC: 241 MG/DL — HIGH (ref 70–99)
HCT VFR BLD CALC: 29.8 % — LOW (ref 39–50)
HGB BLD-MCNC: 9.1 G/DL — LOW (ref 13–17)
MCHC RBC-ENTMCNC: 30.2 PG — SIGNIFICANT CHANGE UP (ref 27–34)
MCHC RBC-ENTMCNC: 30.5 G/DL — LOW (ref 32–36)
MCV RBC AUTO: 99 FL — SIGNIFICANT CHANGE UP (ref 80–100)
NRBC # BLD: 0 /100 WBCS — SIGNIFICANT CHANGE UP (ref 0–0)
PLATELET # BLD AUTO: 270 K/UL — SIGNIFICANT CHANGE UP (ref 150–400)
RBC # BLD: 3.01 M/UL — LOW (ref 4.2–5.8)
RBC # FLD: 14.7 % — HIGH (ref 10.3–14.5)
SPECIMEN SOURCE: SIGNIFICANT CHANGE UP
SPECIMEN SOURCE: SIGNIFICANT CHANGE UP
WBC # BLD: 8.09 K/UL — SIGNIFICANT CHANGE UP (ref 3.8–10.5)
WBC # FLD AUTO: 8.09 K/UL — SIGNIFICANT CHANGE UP (ref 3.8–10.5)

## 2022-12-20 PROCEDURE — 99232 SBSQ HOSP IP/OBS MODERATE 35: CPT

## 2022-12-20 RX ADMIN — ATORVASTATIN CALCIUM 20 MILLIGRAM(S): 80 TABLET, FILM COATED ORAL at 23:09

## 2022-12-20 RX ADMIN — Medication 1 APPLICATION(S): at 05:51

## 2022-12-20 RX ADMIN — LEVETIRACETAM 1500 MILLIGRAM(S): 250 TABLET, FILM COATED ORAL at 17:46

## 2022-12-20 RX ADMIN — Medication 25 MILLIGRAM(S): at 17:45

## 2022-12-20 RX ADMIN — Medication 4: at 00:49

## 2022-12-20 RX ADMIN — PREGABALIN 1000 MICROGRAM(S): 225 CAPSULE ORAL at 12:46

## 2022-12-20 RX ADMIN — Medication 1 APPLICATION(S): at 17:45

## 2022-12-20 RX ADMIN — Medication 500 MILLIGRAM(S): at 23:09

## 2022-12-20 RX ADMIN — Medication 500 MILLIGRAM(S): at 05:53

## 2022-12-20 RX ADMIN — CHLORHEXIDINE GLUCONATE 15 MILLILITER(S): 213 SOLUTION TOPICAL at 17:44

## 2022-12-20 RX ADMIN — Medication 25 MILLIGRAM(S): at 05:48

## 2022-12-20 RX ADMIN — ENOXAPARIN SODIUM 80 MILLIGRAM(S): 100 INJECTION SUBCUTANEOUS at 05:48

## 2022-12-20 RX ADMIN — Medication 2: at 23:08

## 2022-12-20 RX ADMIN — Medication 81 MILLIGRAM(S): at 12:46

## 2022-12-20 RX ADMIN — ENOXAPARIN SODIUM 80 MILLIGRAM(S): 100 INJECTION SUBCUTANEOUS at 17:46

## 2022-12-20 RX ADMIN — INSULIN GLARGINE 20 UNIT(S): 100 INJECTION, SOLUTION SUBCUTANEOUS at 08:56

## 2022-12-20 RX ADMIN — Medication 500 MILLIGRAM(S): at 13:03

## 2022-12-20 RX ADMIN — Medication 1 MILLIGRAM(S): at 12:46

## 2022-12-20 RX ADMIN — Medication 1 APPLICATION(S): at 06:28

## 2022-12-20 RX ADMIN — CHLORHEXIDINE GLUCONATE 1 APPLICATION(S): 213 SOLUTION TOPICAL at 05:49

## 2022-12-20 RX ADMIN — Medication 2: at 13:02

## 2022-12-20 RX ADMIN — LEVETIRACETAM 1500 MILLIGRAM(S): 250 TABLET, FILM COATED ORAL at 05:48

## 2022-12-20 RX ADMIN — CHLORHEXIDINE GLUCONATE 15 MILLILITER(S): 213 SOLUTION TOPICAL at 06:28

## 2022-12-20 NOTE — PROGRESS NOTE ADULT - SUBJECTIVE AND OBJECTIVE BOX
BRANDON CAMARILLO  MRN-[id number]    Follow Up:  low grade temperatures     Interval History: the pt was seen and examined earlier, no distress, vented via tracheostomy. The pt is afebrile, no leukocytosis.     PAST MEDICAL & SURGICAL HISTORY:  HTN (hypertension)      HLD (hyperlipidemia)      Diabetes mellitus      Lacunar infarction      BPH (benign prostatic hyperplasia)      CHF (congestive heart failure)      Chronic obstructive pulmonary disease, unspecified COPD type      S/P CABG (coronary artery bypass graft)  2014          ROS:    [x ] Unobtainable because: tracheostomy   [ ] All other systems negative    Constitutional: no fever, no chills  Head: no trauma  Eyes: no vision changes, no eye pain  ENT:  no sore throat, no rhinorrhea  Cardiovascular:  no chest pain, no palpitation  Respiratory:  no SOB, no cough  GI:  no abd pain, no vomiting, no diarrhea  urinary: no dysuria, no hematuria, no flank pain  musculoskeletal:  no joint pain, no joint swelling  skin:  no rash  neurology:  no headache, no seizure, no change in mental status  psych: no anxiety, no depression         Allergies  No Known Allergies        ANTIMICROBIALS:      OTHER MEDS:  acetaminophen     Tablet .. 650 milliGRAM(s) Oral every 6 hours PRN  aluminum hydroxide/magnesium hydroxide/simethicone Suspension 30 milliLiter(s) Oral every 4 hours PRN  aspirin  chewable 81 milliGRAM(s) Oral daily  atorvastatin 20 milliGRAM(s) Oral at bedtime  bacitracin   Ointment 1 Application(s) Topical two times a day  chlorhexidine 0.12% Liquid 15 milliLiter(s) Oral Mucosa every 12 hours  chlorhexidine 2% Cloths 1 Application(s) Topical <User Schedule>  collagenase Ointment 1 Application(s) Topical two times a day  cyanocobalamin 1000 MICROGram(s) Oral daily  enoxaparin Injectable 80 milliGRAM(s) SubCutaneous every 12 hours  folic acid 1 milliGRAM(s) Oral daily  insulin glargine Injectable (LANTUS) 20 Unit(s) SubCutaneous every morning  insulin lispro (ADMELOG) corrective regimen sliding scale   SubCutaneous every 6 hours  levETIRAcetam 1500 milliGRAM(s) Oral two times a day  metoprolol tartrate 25 milliGRAM(s) Oral two times a day  polyethylene glycol 3350 17 Gram(s) Oral daily  senna 2 Tablet(s) Oral at bedtime  silver sulfADIAZINE 1% Cream 1 Application(s) Topical two times a day  valproic  acid Syrup 500 milliGRAM(s) Enteral Tube every 8 hours      Vital Signs Last 24 Hrs  T(C): 37.1 (20 Dec 2022 10:00), Max: 37.2 (19 Dec 2022 15:50)  T(F): 98.7 (20 Dec 2022 10:00), Max: 99 (20 Dec 2022 04:46)  HR: 92 (20 Dec 2022 12:30) (60 - 92)  BP: 133/76 (20 Dec 2022 10:00) (116/72 - 150/80)  BP(mean): --  RR: 20 (20 Dec 2022 10:00) (18 - 20)  SpO2: 97% (20 Dec 2022 12:30) (96% - 100%)    Parameters below as of 19 Dec 2022 15:50  Patient On (Oxygen Delivery Method): ventilator        Physical Exam:  General: Nontoxic-appearing Male in no acute distress.  HEENT: AT/NC.Anicteric. Conjunctiva pink and moist. Oropharynx unable due to patient compliance.   Neck: Not rigid. No sense of mass. Trach C/D/I  Nodes: None palpable.  Lungs: Diminished BS Bilaterally   Heart: Regular rate and rhythm.   Abdomen: Bowel sounds present and normoactive. Soft. Nondistended. Nontender. No sense of mass. No organomegaly. PEG in place c/d/i  Back: unable   Extremities: No cyanosis or clubbing. LUE - improved   Skin: Warm. Dry. Good turgor. No rash. No vasculitic stigmata.   Psychiatric: Unable to assess, calm      WBC Count: 8.09 K/uL (12-20 @ 08:06)  WBC Count: 8.00 K/uL (12-19 @ 07:17)  WBC Count: 7.00 K/uL (12-18 @ 06:17)  WBC Count: 8.97 K/uL (12-17 @ 07:00)  WBC Count: 6.17 K/uL (12-16 @ 07:38)  WBC Count: 6.98 K/uL (12-15 @ 08:33)  WBC Count: 7.81 K/uL (12-14 @ 10:15)                            9.1    8.09  )-----------( 270      ( 20 Dec 2022 08:06 )             29.8       Creatinine Trend: 0.78<--, 0.76<--, 0.62<--, 0.59<--, 0.60<--, 0.60<--      MICROBIOLOGY:  v  .Blood Blood-Peripheral  12-15-22   No growth to date.  --  --      .Abscess left hand  12-13-22   Moderate Enterococcus faecalis  Rare Staphylococcus epidermidis "Susceptibilities not performed"  --  Enterococcus faecalis      Clean Catch Clean Catch (Midstream)  11-26-22   >100,000 CFU/ml Escherichia coli  --  Escherichia coli      ET Tube ET Tube  11-25-22   Normal Respiratory Larissa present  --    Rare polymorphonuclear leukocytes per low power field  Rare Squamous epithelial cells per low power field  Few Gram Negative Rods per oil power field      .Blood Blood-Peripheral  11-25-22   No Growth Final  --  --        Ferritin, Serum: 536 (12-13)            SARS-CoV-2: NotDetec (03 Dec 2022 09:45)  SARS-CoV-2: NotDetec (02 Dec 2022 01:55)  SARS-CoV-2: NotDetec (30 Nov 2022 02:30)  SARS-CoV-2: NotDetec (20 Nov 2022 09:10)    RADIOLOGY:

## 2022-12-20 NOTE — PROGRESS NOTE ADULT - ASSESSMENT
Status post incision and drainage of the left hand.  Change at this juncture appear to be mostly related to the DVT more than anything else I expect.  Unclear if the hematoma drained was secondarily infected, or not.  Culture is pending.  No leukocytosis.  He has had intermittent low-grade fevers through the beginning of this month, with higher grade fevers before that.    12/14: low grade temp last night and today around 16:00, no leukocytosis, left hand s/p I&D - culture is pending, cefazolin IV continued.   12/15: no fevers today thus far, no wbc, hand wound culture grew enterococcus, changing abx to IV vancomycin. Pt's wound with no pus, no malodor, seems not tender, no significant swelling or erythema of surrounding tissues, normal temperature. Surveillance BCs x 2 were collected.   12/16: low grade temp last night, no leukocytosis, cr ok, hand wound continues to improve. Pt's back was assessed with wound care / PT, possibly will need to be debrided, recommended vascular consult. Will add Zosyn to the regimen for now, Vancomycin IV continued, awaiting for BCs.   12/19: no longer spiking fevers, no WBC, BCs with no growth to date, now s/p sacral wound debridement, will stop abx.   12/20: no fevers, no leukocytosis, BCs with no growth to date, s/p debridement of sacral wound yesterday, off abx.     Suggestions:  continue monitoring off abx   follow surveillance BCs x 2 - NGTD  follow all culture data  trend pt's temperatures  monitor cbc  monitor pt's respiratory status    will sign off, re-consult as needed    Discussed with Dr. Chambers  Discussed with Dr. Freeman

## 2022-12-20 NOTE — PROGRESS NOTE ADULT - SUBJECTIVE AND OBJECTIVE BOX
BRANDON CAMARILLO    [address] [address]    Allergies    No Known Allergies    Intolerances        PAST MEDICAL & SURGICAL HISTORY:  HTN (hypertension)      HLD (hyperlipidemia)      Diabetes mellitus      Lacunar infarction      BPH (benign prostatic hyperplasia)      CHF (congestive heart failure)      Chronic obstructive pulmonary disease, unspecified COPD type      S/P CABG (coronary artery bypass graft)  2014          FAMILY HISTORY:      Home Medications:  aspirin 81 mg oral delayed release tablet: 1 tab(s) orally once a day (12 Jun 2020 17:35)  Liquifilm Tears preserved ophthalmic solution: 1 drop(s) to each affected eye 2 times a day (12 Jun 2020 17:35)      MEDICATIONS  (STANDING):  aspirin  chewable 81 milliGRAM(s) Oral daily  atorvastatin 20 milliGRAM(s) Oral at bedtime  bacitracin   Ointment 1 Application(s) Topical two times a day  chlorhexidine 0.12% Liquid 15 milliLiter(s) Oral Mucosa every 12 hours  chlorhexidine 2% Cloths 1 Application(s) Topical <User Schedule>  collagenase Ointment 1 Application(s) Topical two times a day  cyanocobalamin 1000 MICROGram(s) Oral daily  enoxaparin Injectable 80 milliGRAM(s) SubCutaneous every 12 hours  folic acid 1 milliGRAM(s) Oral daily  insulin glargine Injectable (LANTUS) 20 Unit(s) SubCutaneous every morning  insulin lispro (ADMELOG) corrective regimen sliding scale   SubCutaneous every 6 hours  levETIRAcetam 1500 milliGRAM(s) Oral two times a day  metoprolol tartrate 25 milliGRAM(s) Oral two times a day  polyethylene glycol 3350 17 Gram(s) Oral daily  senna 2 Tablet(s) Oral at bedtime  silver sulfADIAZINE 1% Cream 1 Application(s) Topical two times a day  valproic  acid Syrup 500 milliGRAM(s) Enteral Tube every 8 hours    MEDICATIONS  (PRN):  acetaminophen     Tablet .. 650 milliGRAM(s) Oral every 6 hours PRN Temp greater or equal to 38C (100.4F), Mild Pain (1 - 3)  aluminum hydroxide/magnesium hydroxide/simethicone Suspension 30 milliLiter(s) Oral every 4 hours PRN Dyspepsia      Diet, NPO with Tube Feed:   Tube Feeding Modality: Gastrostomy  Glucerna 1.2 Pedrito  Total Volume for 24 Hours (mL): 1440  Continuous  Starting Tube Feed Rate mL per Hour: 50  Increase Tube Feed Rate by (mL): 5     Every 8 hours  Until Goal Tube Feed Rate (mL per Hour): 60  Tube Feed Duration (in Hours): 24  Tube Feed Start Time: 13:00 (12-15-22 @ 12:26) [Active]          Vital Signs Last 24 Hrs  T(C): 37.1 (20 Dec 2022 10:00), Max: 37.2 (19 Dec 2022 15:50)  T(F): 98.7 (20 Dec 2022 10:00), Max: 99 (20 Dec 2022 04:46)  HR: 92 (20 Dec 2022 12:30) (60 - 92)  BP: 133/76 (20 Dec 2022 10:00) (116/72 - 150/80)  BP(mean): --  RR: 20 (20 Dec 2022 10:00) (18 - 20)  SpO2: 97% (20 Dec 2022 12:30) (96% - 100%)    Parameters below as of 19 Dec 2022 15:50  Patient On (Oxygen Delivery Method): ventilator          12-19-22 @ 07:01  -  12-20-22 @ 07:00  --------------------------------------------------------  IN: 0 mL / OUT: 1 mL / NET: -1 mL        Mode: AC/ CMV (Assist Control/ Continuous Mandatory Ventilation), RR (machine): 14, TV (machine): 450, FiO2: 30, PEEP: 5, ITime: 0.8, MAP: 9, PIP: 25      LABS:                        9.1    8.09  )-----------( 270      ( 20 Dec 2022 08:06 )             29.8                     WBC:  WBC Count: 8.09 K/uL (12-20 @ 08:06)  WBC Count: 8.00 K/uL (12-19 @ 07:17)  WBC Count: 7.00 K/uL (12-18 @ 06:17)  WBC Count: 8.97 K/uL (12-17 @ 07:00)      MICROBIOLOGY:  RECENT CULTURES:  12-15 .Blood Blood-Peripheral XXXX XXXX   No growth to date.                    Sodium:  Sodium, Serum: 137 mmol/L (12-18 @ 06:17)      0.78 mg/dL 12-18 @ 06:17      Hemoglobin:  Hemoglobin: 9.1 g/dL (12-20 @ 08:06)  Hemoglobin: 9.2 g/dL (12-19 @ 07:17)  Hemoglobin: 8.9 g/dL (12-18 @ 06:17)  Hemoglobin: 9.5 g/dL (12-17 @ 07:00)      Platelets: Platelet Count - Automated: 270 K/uL (12-20 @ 08:06)  Platelet Count - Automated: 239 K/uL (12-19 @ 07:17)  Platelet Count - Automated: 241 K/uL (12-18 @ 06:17)  Platelet Count - Automated: 202 K/uL (12-17 @ 07:00)              RADIOLOGY & ADDITIONAL STUDIES:      MICROBIOLOGY:  RECENT CULTURES:  12-15 .Blood Blood-Peripheral XXXX XXXX   No growth to date.

## 2022-12-20 NOTE — PROGRESS NOTE ADULT - ASSESSMENT
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 Unknown how long pt unresponsive for at home. Blood sugar in the 40s with EMS s/p glucagon. On arrival to ER pt hypothermic and agonally breathing. Pt became unresponsive with loss of pulse; ACLS initiated. PEA arrest with ROSC after approximately 5 min s/p Epi x2.  Pt intubated during CODE BLUE. Per son pt on the day prior to presentation reported low blood sugar reads on his glucometer in the range of 80s. Son believes pt continued to take his insulin and oral diabetic regimen. Son states that pt was eating but may have been eating less in the last few days. Later on pt noted to have tonic-clonic Sz.     PEA arrest  s/p Epi x 2. ROSC achieved  Takotsubo cardiomyopathy found on Echo   stable  12/13--cardio eval appreciated, further management can be done as outpatient     Acute hypoxemic respiratory failure now chronic  s/p intubation , failed extubation, now trach to vent  c/w vent managment  pulm consulted  daily SBT ;12/13  SBT done, tolerated for 7 hours, placed back on full support due to frequent apnea episodes  12/12 trach sutures removed by surgery         LUE VTE with cellulitis /infected left hand hematoma s/p bedside debridement 12/13   LUE edema and wound of left hand ; good radial pulse   --LUE venous duplex : occlusive thrombus in the left mid subclavian vein with partial   slow flow detected in the lateral subclavian vein.  --LUE arterial Duplex neg   --on lovenox   --vascular following   --12/13 Hand surgery consult appreciated, s/p bedside debridement.   --ID consult appreciated  --antibiotic course completed     Sacral Ulcer    -s/p debridement on 12/19    New onset Seizure likely due to anoxic brain injury  discussed with Dr. Loo, patient will be only on Keppra 1500mg bid   off vimpat and Depakote   monitor     ELMER 2 to ATN; resolved    Shock liver.  due to PEA arrest. resolved    DM2   A1c 8.8%  continue with current management   FS goal 140-180   continue with PEG feeds     Hypophosphatemia --repleted     unstageable sacral pressure wound   s/p debridement     Normocytic anemia   no e/o bleeding or bruising   H/H stable   low normal Vit B12 and folic acid --supplement       Preventative measures   heparin gtt --dvt ppx  fall, aspiration  precautions   HOBE     Dispo: patient is undocumented, will need PRUCOL

## 2022-12-20 NOTE — PROGRESS NOTE ADULT - ASSESSMENT
REVIEW OF SYMPTOMS      Able to give (reliable) ROS  NO     PHYSICAL EXAM    HEENT Unremarkable  atraumatic   RESP Fair air entry EXP prolonged    Harsh breath sound Resp distres mild   CARDIAC S1 S2 No S3     NO JVD    ABDOMEN SOFT BS PRESENT NOT DISTENDED No hepatosplenomegaly   PEDAL EDEMA present No calf tenderness  NO rash       GENERAL DATA .   GOC.  12/11/2022 full code       ALLGY.  nka                            WT. ..  12/2/2022 86  BMI. ..    12/2/2022 29                          ICU STAY.  .. 11/29-12/9  COVID.   .. 12/2/2022 scv2 (-)   ..  11/20/2022 scv2 (-)   BEST PRACTICE ISSUES.    HOB ELEVATN. Yes  DVT PPLX.   12/15/2022 lvnx 80.2 ( l sc thrombus)    ALEJO PPLX.   ..      INFN PPLX.   ..  11/25 chlorhex .12%   .. 11/14 chlorhex 2%   SP SW ANTWAN.         DIET.    ..  12/8 vital 1.2 1200 gt   IV fl.  ..            PROCEDURES.  .. 12/5/2022 trach  .. 12/5/2022 peg     ABGS.  12/3/2022 ac 14/450/.3/5 750/46/75      VS/ PO/IO/ VENT/ DRIPS.   12/20/2022 afeb 66 130/70   12/20/2022 ac 14/450/5/.3     MAIN ISSUES.  72 m   PMH CAD s/p PCI with stent 2015 and CABG 2014,   PMH  s/p medtronic PPM,   PMH CVA (lacunar infarct)  1) PEA arrest with ROSC after approximately 5 min 11/14  Now communicative   2) DVT 12/12 l Subclav thromS 12/15/2022 lvnx 80.2  3) TRACH 12/5/2022 trach  4) PEG12/5/2022 peg   5) Cellulitis hand absc 12/13 mod enterococcus fecalis  staph epi 12/15 vanco 12/16 zosyn  6) Vent weaning       PROBLEM ASSESSMENT RECOMMENDATIONS..  Sp  cac 11/14/2022   .. Patient is interactive and answers questions appropriately as dw son on 12/13/2022   VTE.  .. 12/12/2022 Pt found to have l subclav dvt    12/12/2022 iv ufh startd   .. 12/15/2022 changed to lvnx   Trach 12/5   .. trach care   vent management.  .. vent bundle dsbt dsv target pplat 30 (-) PO2 60 (+) pH 730 (+)   weaming.  .. 12/13-12/14-12/16/2022 tolerated cpap 12/13-12/14-12/16/20 rsbi 78-94-71  .. 12/15/2022 tolerated cpap 4 h  .. 12/18/2022 dw rt Pt not tolerating cpap gets agitated after few min on 12/17    Infection.  ..  Monitor for vap    cellulitis hand   .. w 12/16-12/18-12/19-12/20/2022 w 6 - 7 - 8 - 8   .. bc 12/15 (-)   .. absc 12/13 mod enterococcus fecalis  staph epi   .. 12/12-12/15/2022  5 d course cephalexin for cellulitis  .. 12/15/2022 vanco 1250x2 Dr VICTOR    .. 12/16/2022 zosyn   Vanco level.  .. 12/18/2022 vanco 18   CAD.  .. 11/14 asa 81   .. 12/13 metoprolol 25.2   .. Monitor   CHF.   .. 11/14/2022 echo mod decr segmental lvsf apical lateral apiccal ant segment are abn takotsubo cmpthy pasp 42 .  .. monitor for chf   anemia.  .. Hb 12/16-12/17-12/18-12/19-12/20/2022 Hb 8.9 - 9.5- 8.9  - 9.2 - 9.1   .. Monitor  Target Hb 7 (+)   DECUB.  .. 12/2 collagenase   SEIZURES.  .. sz meds as guided by neuro   NONVIOLENT NONSELF DESTRUCTIVE LEVEL 1  .. 12/20/2022     TIME SPENT   Over 25 minutes aggregate care time spent on encounter; activities included   direct patient care, counseling and/or coordinating care reviewing notes, lab data/ imaging , discussion with multidisciplinary team/ patient  /family and explaining in detail risks, benefits, alternatives  of the recommendations     MARQUISE TAYLOR 72 m 11/14/2022 1948 Dr Leela Dowling

## 2022-12-20 NOTE — PROGRESS NOTE ADULT - SUBJECTIVE AND OBJECTIVE BOX
Patient is a 72y old  Male who presents with a chief complaint of s/p cardiac arrest, hypoglycemia (19 Dec 2022 15:26)    INTERVAL HPI/OVERNIGHT EVENTS:    MEDICATIONS  (STANDING):  aspirin  chewable 81 milliGRAM(s) Oral daily  atorvastatin 20 milliGRAM(s) Oral at bedtime  bacitracin   Ointment 1 Application(s) Topical two times a day  chlorhexidine 0.12% Liquid 15 milliLiter(s) Oral Mucosa every 12 hours  chlorhexidine 2% Cloths 1 Application(s) Topical <User Schedule>  collagenase Ointment 1 Application(s) Topical two times a day  cyanocobalamin 1000 MICROGram(s) Oral daily  enoxaparin Injectable 80 milliGRAM(s) SubCutaneous every 12 hours  folic acid 1 milliGRAM(s) Oral daily  insulin glargine Injectable (LANTUS) 20 Unit(s) SubCutaneous every morning  insulin lispro (ADMELOG) corrective regimen sliding scale   SubCutaneous every 6 hours  levETIRAcetam 1500 milliGRAM(s) Oral two times a day  metoprolol tartrate 25 milliGRAM(s) Oral two times a day  polyethylene glycol 3350 17 Gram(s) Oral daily  senna 2 Tablet(s) Oral at bedtime  silver sulfADIAZINE 1% Cream 1 Application(s) Topical two times a day  valproic  acid Syrup 500 milliGRAM(s) Enteral Tube every 8 hours    MEDICATIONS  (PRN):  acetaminophen     Tablet .. 650 milliGRAM(s) Oral every 6 hours PRN Temp greater or equal to 38C (100.4F), Mild Pain (1 - 3)  aluminum hydroxide/magnesium hydroxide/simethicone Suspension 30 milliLiter(s) Oral every 4 hours PRN Dyspepsia    Allergies    No Known Allergies    Intolerances      REVIEW OF SYSTEMS:  All other systems reviewed and are negative    Vital Signs Last 24 Hrs  T(C): 37.1 (20 Dec 2022 10:00), Max: 37.8 (19 Dec 2022 11:43)  T(F): 98.7 (20 Dec 2022 10:00), Max: 100.1 (19 Dec 2022 11:43)  HR: 85 (20 Dec 2022 11:16) (60 - 85)  BP: 133/76 (20 Dec 2022 10:00) (116/72 - 150/80)  BP(mean): --  RR: 20 (20 Dec 2022 10:00) (18 - 20)  SpO2: 97% (20 Dec 2022 11:16) (96% - 100%)    Parameters below as of 19 Dec 2022 15:50  Patient On (Oxygen Delivery Method): ventilator      Daily     Daily   I&O's Summary    19 Dec 2022 07:01  -  20 Dec 2022 07:00  --------------------------------------------------------  IN: 0 mL / OUT: 1 mL / NET: -1 mL      CAPILLARY BLOOD GLUCOSE      POCT Blood Glucose.: 130 mg/dL (20 Dec 2022 06:40)  POCT Blood Glucose.: 241 mg/dL (20 Dec 2022 00:44)  POCT Blood Glucose.: 161 mg/dL (19 Dec 2022 21:16)  POCT Blood Glucose.: 114 mg/dL (19 Dec 2022 17:10)  POCT Blood Glucose.: 170 mg/dL (19 Dec 2022 14:32)    PHYSICAL EXAM:  GENERAL: NAD,  on trach to vent   HEAD:  Atraumatic, Normocephalic  EYES: EOMI, PERRLA, conjunctiva and sclera clear  ENMT: No tonsillar erythema, exudates, or enlargement; Moist mucous membranes   NECK: Supple, No JVD, +TRACH  NERVOUS SYSTEM:  awake and alert,  moving extremities spontaneously   CHEST/LUNG: CTAB;  No rales, rhonchi, wheezing, or rubs  HEART: Regular rate and rhythm; No murmurs, rubs, or gallops  ABDOMEN: Soft, Nontender, Nondistended; Bowel sounds present; +PEG   EXTREMITIES:   left hand dressing cdi     Labs                          9.1    8.09  )-----------( 270      ( 20 Dec 2022 08:06 )             29.8                               DVT prophylaxis: > Lovenox 40mg SQ daily  > Heparin   > SCD's

## 2022-12-21 LAB
GLUCOSE BLDC GLUCOMTR-MCNC: 140 MG/DL — HIGH (ref 70–99)
GLUCOSE BLDC GLUCOMTR-MCNC: 143 MG/DL — HIGH (ref 70–99)
GLUCOSE BLDC GLUCOMTR-MCNC: 172 MG/DL — HIGH (ref 70–99)
GLUCOSE BLDC GLUCOMTR-MCNC: 172 MG/DL — HIGH (ref 70–99)
HCT VFR BLD CALC: 29.2 % — LOW (ref 39–50)
HGB BLD-MCNC: 9 G/DL — LOW (ref 13–17)
MCHC RBC-ENTMCNC: 30.6 PG — SIGNIFICANT CHANGE UP (ref 27–34)
MCHC RBC-ENTMCNC: 30.8 G/DL — LOW (ref 32–36)
MCV RBC AUTO: 99.3 FL — SIGNIFICANT CHANGE UP (ref 80–100)
NRBC # BLD: 0 /100 WBCS — SIGNIFICANT CHANGE UP (ref 0–0)
PLATELET # BLD AUTO: 282 K/UL — SIGNIFICANT CHANGE UP (ref 150–400)
RBC # BLD: 2.94 M/UL — LOW (ref 4.2–5.8)
RBC # FLD: 14.7 % — HIGH (ref 10.3–14.5)
SURGICAL PATHOLOGY STUDY: SIGNIFICANT CHANGE UP
WBC # BLD: 6.98 K/UL — SIGNIFICANT CHANGE UP (ref 3.8–10.5)
WBC # FLD AUTO: 6.98 K/UL — SIGNIFICANT CHANGE UP (ref 3.8–10.5)

## 2022-12-21 PROCEDURE — 99232 SBSQ HOSP IP/OBS MODERATE 35: CPT

## 2022-12-21 PROCEDURE — 99233 SBSQ HOSP IP/OBS HIGH 50: CPT

## 2022-12-21 RX ADMIN — ATORVASTATIN CALCIUM 20 MILLIGRAM(S): 80 TABLET, FILM COATED ORAL at 22:57

## 2022-12-21 RX ADMIN — LEVETIRACETAM 1500 MILLIGRAM(S): 250 TABLET, FILM COATED ORAL at 05:19

## 2022-12-21 RX ADMIN — INSULIN GLARGINE 20 UNIT(S): 100 INJECTION, SOLUTION SUBCUTANEOUS at 09:26

## 2022-12-21 RX ADMIN — Medication 500 MILLIGRAM(S): at 22:57

## 2022-12-21 RX ADMIN — Medication 25 MILLIGRAM(S): at 17:34

## 2022-12-21 RX ADMIN — Medication 500 MILLIGRAM(S): at 13:06

## 2022-12-21 RX ADMIN — Medication 500 MILLIGRAM(S): at 05:19

## 2022-12-21 RX ADMIN — Medication 1 APPLICATION(S): at 17:29

## 2022-12-21 RX ADMIN — ENOXAPARIN SODIUM 80 MILLIGRAM(S): 100 INJECTION SUBCUTANEOUS at 05:21

## 2022-12-21 RX ADMIN — CHLORHEXIDINE GLUCONATE 1 APPLICATION(S): 213 SOLUTION TOPICAL at 05:18

## 2022-12-21 RX ADMIN — Medication 650 MILLIGRAM(S): at 23:33

## 2022-12-21 RX ADMIN — Medication 1 APPLICATION(S): at 05:18

## 2022-12-21 RX ADMIN — Medication 1 APPLICATION(S): at 17:30

## 2022-12-21 RX ADMIN — PREGABALIN 1000 MICROGRAM(S): 225 CAPSULE ORAL at 13:08

## 2022-12-21 RX ADMIN — CHLORHEXIDINE GLUCONATE 15 MILLILITER(S): 213 SOLUTION TOPICAL at 05:19

## 2022-12-21 RX ADMIN — Medication 1 APPLICATION(S): at 05:19

## 2022-12-21 RX ADMIN — Medication 81 MILLIGRAM(S): at 13:08

## 2022-12-21 RX ADMIN — Medication 2: at 05:20

## 2022-12-21 RX ADMIN — LEVETIRACETAM 1500 MILLIGRAM(S): 250 TABLET, FILM COATED ORAL at 17:34

## 2022-12-21 RX ADMIN — Medication 2: at 23:07

## 2022-12-21 RX ADMIN — CHLORHEXIDINE GLUCONATE 15 MILLILITER(S): 213 SOLUTION TOPICAL at 17:30

## 2022-12-21 RX ADMIN — ENOXAPARIN SODIUM 80 MILLIGRAM(S): 100 INJECTION SUBCUTANEOUS at 17:34

## 2022-12-21 RX ADMIN — Medication 1 MILLIGRAM(S): at 13:08

## 2022-12-21 NOTE — PROGRESS NOTE ADULT - SUBJECTIVE AND OBJECTIVE BOX
BRANDON CAMARILLO    [address]    Allergies    No Known Allergies    Intolerances        PAST MEDICAL & SURGICAL HISTORY:  HTN (hypertension)      HLD (hyperlipidemia)      Diabetes mellitus      Lacunar infarction      BPH (benign prostatic hyperplasia)      CHF (congestive heart failure)      Chronic obstructive pulmonary disease, unspecified COPD type      S/P CABG (coronary artery bypass graft)  2014          FAMILY HISTORY:      Home Medications:  aspirin 81 mg oral delayed release tablet: 1 tab(s) orally once a day (12 Jun 2020 17:35)  Liquifilm Tears preserved ophthalmic solution: 1 drop(s) to each affected eye 2 times a day (12 Jun 2020 17:35)      MEDICATIONS  (STANDING):  aspirin  chewable 81 milliGRAM(s) Oral daily  atorvastatin 20 milliGRAM(s) Oral at bedtime  bacitracin   Ointment 1 Application(s) Topical two times a day  chlorhexidine 0.12% Liquid 15 milliLiter(s) Oral Mucosa every 12 hours  chlorhexidine 2% Cloths 1 Application(s) Topical <User Schedule>  collagenase Ointment 1 Application(s) Topical two times a day  cyanocobalamin 1000 MICROGram(s) Oral daily  enoxaparin Injectable 80 milliGRAM(s) SubCutaneous every 12 hours  folic acid 1 milliGRAM(s) Oral daily  insulin glargine Injectable (LANTUS) 20 Unit(s) SubCutaneous every morning  insulin lispro (ADMELOG) corrective regimen sliding scale   SubCutaneous every 6 hours  levETIRAcetam 1500 milliGRAM(s) Oral two times a day  metoprolol tartrate 25 milliGRAM(s) Oral two times a day  polyethylene glycol 3350 17 Gram(s) Oral daily  senna 2 Tablet(s) Oral at bedtime  silver sulfADIAZINE 1% Cream 1 Application(s) Topical two times a day  valproic  acid Syrup 500 milliGRAM(s) Enteral Tube every 8 hours    MEDICATIONS  (PRN):  acetaminophen     Tablet .. 650 milliGRAM(s) Oral every 6 hours PRN Temp greater or equal to 38C (100.4F), Mild Pain (1 - 3)  aluminum hydroxide/magnesium hydroxide/simethicone Suspension 30 milliLiter(s) Oral every 4 hours PRN Dyspepsia      Diet, NPO with Tube Feed:   Tube Feeding Modality: Gastrostomy  Glucerna 1.2 Pedrito  Total Volume for 24 Hours (mL): 1440  Continuous  Starting Tube Feed Rate mL per Hour: 50  Increase Tube Feed Rate by (mL): 5     Every 8 hours  Until Goal Tube Feed Rate (mL per Hour): 60  Tube Feed Duration (in Hours): 24  Tube Feed Start Time: 13:00 (12-15-22 @ 12:26) [Active]          Vital Signs Last 24 Hrs  T(C): 36.7 (21 Dec 2022 04:39), Max: 37.6 (20 Dec 2022 23:27)  T(F): 98.1 (21 Dec 2022 04:39), Max: 99.6 (20 Dec 2022 23:27)  HR: 72 (21 Dec 2022 05:20) (66 - 92)  BP: 100/62 (21 Dec 2022 04:39) (100/62 - 133/76)  BP(mean): --  RR: 19 (21 Dec 2022 04:39) (18 - 20)  SpO2: 98% (21 Dec 2022 05:20) (97% - 100%)    Parameters below as of 21 Dec 2022 04:39  Patient On (Oxygen Delivery Method): ventilator            Mode: AC/ CMV (Assist Control/ Continuous Mandatory Ventilation), RR (machine): 14, TV (machine): 450, FiO2: 30, PEEP: 5, ITime: 0.8, MAP: 11, PIP: 29      LABS:                        9.1    8.09  )-----------( 270      ( 20 Dec 2022 08:06 )             29.8                     WBC:  WBC Count: 8.09 K/uL (12-20 @ 08:06)  WBC Count: 8.00 K/uL (12-19 @ 07:17)  WBC Count: 7.00 K/uL (12-18 @ 06:17)      MICROBIOLOGY:  RECENT CULTURES:  12-19 .Surgical Swab Surgical Swab XXXX XXXX   No growth    12-15 .Blood Blood-Peripheral XXXX XXXX   No Growth Final                    Sodium:  Sodium, Serum: 137 mmol/L (12-18 @ 06:17)      0.78 mg/dL 12-18 @ 06:17      Hemoglobin:  Hemoglobin: 9.1 g/dL (12-20 @ 08:06)  Hemoglobin: 9.2 g/dL (12-19 @ 07:17)  Hemoglobin: 8.9 g/dL (12-18 @ 06:17)      Platelets: Platelet Count - Automated: 270 K/uL (12-20 @ 08:06)  Platelet Count - Automated: 239 K/uL (12-19 @ 07:17)  Platelet Count - Automated: 241 K/uL (12-18 @ 06:17)              RADIOLOGY & ADDITIONAL STUDIES:      MICROBIOLOGY:  RECENT CULTURES:  12-19 .Surgical Swab Surgical Swab XXXX XXXX   No growth    12-15 .Blood Blood-Peripheral XXXX XXXX   No Growth Final

## 2022-12-21 NOTE — PROGRESS NOTE ADULT - ASSESSMENT
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 Unknown how long pt unresponsive for at home. Blood sugar in the 40s with EMS s/p glucagon. On arrival to ER pt hypothermic and agonally breathing. Pt became unresponsive with loss of pulse; ACLS initiated. PEA arrest with ROSC after approximately 5 min s/p Epi x2.  Pt intubated during CODE BLUE. Per son pt on the day prior to presentation reported low blood sugar reads on his glucometer in the range of 80s. Son believes pt continued to take his insulin and oral diabetic regimen. Son states that pt was eating but may have been eating less in the last few days. Later on pt noted to have tonic-clonic Sz.     PEA arrest  s/p Epi x 2. ROSC achieved  Takotsubo cardiomyopathy found on Echo   stable  12/13--cardio eval appreciated, further management can be done as outpatient     Acute hypoxemic respiratory failure now chronic  s/p intubation , failed extubation, now trach to vent  c/w vent managment  pulm consulted  daily SBT ;12/13  SBT done, tolerated for 7 hours, placed back on full support due to frequent apnea episodes  12/12 trach sutures removed by surgery         LUE VTE with cellulitis /infected left hand hematoma s/p bedside debridement 12/13   LUE edema and wound of left hand ; good radial pulse   --LUE venous duplex : occlusive thrombus in the left mid subclavian vein with partial   slow flow detected in the lateral subclavian vein.  --LUE arterial Duplex neg   --on lovenox   --vascular following   --12/13 Hand surgery consult appreciated, s/p bedside debridement.   --ID consult appreciated  --antibiotic course completed     Sacral Ulcer  -s/p debridement on 12/19    New onset Seizure likely due to anoxic brain injury  discussed with Dr. Loo, patient will be only on Keppra 1500mg bid   off vimpat and Depakote   monitor     ELMER 2 to ATN; resolved    Shock liver.  due to PEA arrest. resolved    DM2   A1c 8.8%  continue with current management   FS goal 140-180   continue with PEG feeds     Hypophosphatemia --repleted     unstageable sacral pressure wound   s/p debridement     Normocytic anemia   no e/o bleeding or bruising   H/H stable   low normal Vit B12 and folic acid --supplement       Preventative measures   heparin gtt --dvt ppx  fall, aspiration  precautions   HOBE     Dispo: patient is undocumented, will need PRUCOL

## 2022-12-21 NOTE — PROGRESS NOTE ADULT - SUBJECTIVE AND OBJECTIVE BOX
Patient is a 72y old  Male who presents with a chief complaint of s/p cardiac arrest, hypoglycemia (21 Dec 2022 07:06)      INTERVAL HPI/OVERNIGHT EVENTS:  Pt was seen and examined, no acute events.      MEDICATIONS  (STANDING):  aspirin  chewable 81 milliGRAM(s) Oral daily  atorvastatin 20 milliGRAM(s) Oral at bedtime  bacitracin   Ointment 1 Application(s) Topical two times a day  chlorhexidine 0.12% Liquid 15 milliLiter(s) Oral Mucosa every 12 hours  chlorhexidine 2% Cloths 1 Application(s) Topical <User Schedule>  collagenase Ointment 1 Application(s) Topical two times a day  cyanocobalamin 1000 MICROGram(s) Oral daily  enoxaparin Injectable 80 milliGRAM(s) SubCutaneous every 12 hours  folic acid 1 milliGRAM(s) Oral daily  insulin glargine Injectable (LANTUS) 20 Unit(s) SubCutaneous every morning  insulin lispro (ADMELOG) corrective regimen sliding scale   SubCutaneous every 6 hours  levETIRAcetam 1500 milliGRAM(s) Oral two times a day  metoprolol tartrate 25 milliGRAM(s) Oral two times a day  polyethylene glycol 3350 17 Gram(s) Oral daily  senna 2 Tablet(s) Oral at bedtime  silver sulfADIAZINE 1% Cream 1 Application(s) Topical two times a day  valproic  acid Syrup 500 milliGRAM(s) Enteral Tube every 8 hours    MEDICATIONS  (PRN):  acetaminophen     Tablet .. 650 milliGRAM(s) Oral every 6 hours PRN Temp greater or equal to 38C (100.4F), Mild Pain (1 - 3)  aluminum hydroxide/magnesium hydroxide/simethicone Suspension 30 milliLiter(s) Oral every 4 hours PRN Dyspepsia      Allergies  No Known Allergies      Vital Signs Last 24 Hrs  T(C): 37.1 (21 Dec 2022 16:39), Max: 37.6 (20 Dec 2022 23:27)  T(F): 98.8 (21 Dec 2022 16:39), Max: 99.6 (20 Dec 2022 23:27)  HR: 84 (21 Dec 2022 16:39) (70 - 90)  BP: 127/70 (21 Dec 2022 16:39) (100/62 - 127/70)  BP(mean): --  RR: 18 (21 Dec 2022 16:39) (18 - 19)  SpO2: 100% (21 Dec 2022 16:39) (98% - 100%)    Parameters below as of 21 Dec 2022 04:39  Patient On (Oxygen Delivery Method): ventilator        PHYSICAL EXAM:  GENERAL: NAD  HEAD:  Atraumatic  EYES: PERRLA  ENMT: Mouth moist  NECK: Supple  NERVOUS SYSTEM:  Awake, alert  CHEST/LUNG: Diminished, on vent  HEART: RRR, S1, S2  ABDOMEN: Soft,  non tender, PEG in place  EXTREMITIES:  No edema B/L LE  SKIN: No rash        LABS:                        9.0    6.98  )-----------( 282      ( 21 Dec 2022 07:30 )             29.2               CAPILLARY BLOOD GLUCOSE      POCT Blood Glucose.: 143 mg/dL (21 Dec 2022 17:38)  POCT Blood Glucose.: 140 mg/dL (21 Dec 2022 12:08)  POCT Blood Glucose.: 172 mg/dL (21 Dec 2022 05:13)  POCT Blood Glucose.: 185 mg/dL (20 Dec 2022 23:07)      Culture - Surgical Swab (collected 19 Dec 2022 14:30)  Source: .Surgical Swab Surgical Swab  Preliminary Report (20 Dec 2022 16:17):    No growth      RADIOLOGY & ADDITIONAL TESTS:    Imaging Personally Reviewed:  [ ] YES  [ ] NO    Consultant(s) Notes Reviewed:  [ ] YES  [ ] NO    Care Discussed with Consultants/Other Providers [ ] YES  [ ] NOPt

## 2022-12-21 NOTE — PROGRESS NOTE ADULT - ASSESSMENT
REVIEW OF SYMPTOMS      Able to give (reliable) ROS  NO     PHYSICAL EXAM    HEENT Unremarkable  atraumatic   RESP Fair air entry EXP prolonged    Harsh breath sound Resp distres mild   CARDIAC S1 S2 No S3     NO JVD    ABDOMEN SOFT BS PRESENT NOT DISTENDED No hepatosplenomegaly   PEDAL EDEMA present No calf tenderness  NO rash       GENERAL DATA .   GOC.  12/11/2022 full code       ALLGY.  nka                            WT. ..  12/2/2022 86  BMI. ..    12/2/2022 29                          ICU STAY.  .. 11/29-12/9  COVID.   .. 12/2/2022 scv2 (-)   ..  11/20/2022 scv2 (-)   BEST PRACTICE ISSUES.    HOB ELEVATN. Yes  DVT PPLX.   12/15/2022 lvnx 80.2 ( l sc thrombus)    ALEJO PPLX.   ..      INFN PPLX.   ..  11/25 chlorhex .12%   .. 11/14 chlorhex 2%   SP SW ANTWAN.         DIET.    ..  12/8 vital 1.2 1200 gt   IV fl.  ..            PROCEDURES.  .. 12/19 debridement of sacral wound   .. 12/5/2022 trach  .. 12/5/2022 peg   .. 12/12 r arm midline       ABGS.  12/3/2022 ac 14/450/.3/5 750/46/75      VS/ PO/IO/ VENT/ DRIPS.   12/21/2022 afeb 78 120/70   12/21/2022 12p cpap 5/5/.3 rsbi 70     MAIN ISSUES.  72 m doa 11/14/2022 cac  PMH CAD s/p PCI with stent 2015 and CABG 2014,   PMH  s/p medtronic PPM,   PMH CVA (lacunar infarct)  1) PEA arrest with ROSC after approximately 5 min 11/14  Now communicative   2) DVT 12/12 l Subclav thromS 12/15/2022 lvnx 80.2  3) TRACH 12/5/2022 trach  4) PEG12/5/2022 peg   5) Cellulitis hand absc 12/13 mod enterococcus fecalis  staph epi 12/12-12/15/2022  cephalexin 12/15 vanco once  12/16-12/19 zosyn  6) Vent weaning       PROBLEM ASSESSMENT RECOMMENDATIONS..  Sp  cac 11/14/2022   .. Patient is interactive and answers questions appropriately as dw son on 12/13/2022   VTE.  .. 12/12/2022 Pt found to have l subclav dvt    12/12/2022 iv ufh startd   .. 12/15/2022 changed to lvnx   Trach 12/5   .. trach care   vent management.  .. vent bundle dsbt dsv target pplat 30 (-) PO2 60 (+) pH 730 (+)   weaming.  .. 12/13-12/14-12/16/2022 tolerated cpap 12/13-12/14-12/16/20 rsbi 78-94-71  .. 12/15/2022 tolerated cpap 4 h  .. 12/18/2022 dw rt Pt not tolerating cpap gets agitated after few min on 12/17    Infection.  ..  Monitor for vap    cellulitis hand   .. w 12/16-12/18-12/19-12/20/2022 w 6 - 7 - 8 - 8   .. bc 12/15 (-)   .. absc 12/13 mod enterococcus fecalis  staph epi   .. 12/12-12/15/2022  5 d course cephalexin for cellulitis  .. 12/15/2022 vanco 1250x2 Dr VICTOR    .. 12/16-12/19/2022 zosyn   Vanco level.  .. 12/18/2022 vanco 18   CAD.  .. 11/14 asa 81   .. 12/13 metoprolol 25.2   .. Monitor   CHF.   .. 11/14/2022 echo mod decr segmental lvsf apical lateral apiccal ant segment are abn takotsubo cmpthy pasp 42 .  .. monitor for chf   anemia.  .. Hb 12/16-12/17-12/18-12/19-12/20/2022 Hb 8.9 - 9.5- 8.9  - 9.2 - 9.1   .. Monitor  Target Hb 7 (+)   DECUB.  .. 12/2 collagenase   SEIZURES.  .. sz meds as guided by neuro     TIME SPENT   Over 25 minutes aggregate care time spent on encounter; activities included   direct patient care, counseling and/or coordinating care reviewing notes, lab data/ imaging , discussion with multidisciplinary team/ patient  /family and explaining in detail risks, benefits, alternatives  of the recommendations     MARQUISE TAYLOR 72 m 11/14/2022 1948 Dr Leela Dowling

## 2022-12-22 LAB
ANION GAP SERPL CALC-SCNC: 7 MMOL/L — SIGNIFICANT CHANGE UP (ref 5–17)
BUN SERPL-MCNC: 22 MG/DL — SIGNIFICANT CHANGE UP (ref 7–23)
CALCIUM SERPL-MCNC: 8.9 MG/DL — SIGNIFICANT CHANGE UP (ref 8.5–10.1)
CHLORIDE SERPL-SCNC: 100 MMOL/L — SIGNIFICANT CHANGE UP (ref 96–108)
CO2 SERPL-SCNC: 33 MMOL/L — HIGH (ref 22–31)
CREAT SERPL-MCNC: 0.86 MG/DL — SIGNIFICANT CHANGE UP (ref 0.5–1.3)
EGFR: 91 ML/MIN/1.73M2 — SIGNIFICANT CHANGE UP
GLUCOSE BLDC GLUCOMTR-MCNC: 106 MG/DL — HIGH (ref 70–99)
GLUCOSE BLDC GLUCOMTR-MCNC: 148 MG/DL — HIGH (ref 70–99)
GLUCOSE BLDC GLUCOMTR-MCNC: 182 MG/DL — HIGH (ref 70–99)
GLUCOSE BLDC GLUCOMTR-MCNC: 85 MG/DL — SIGNIFICANT CHANGE UP (ref 70–99)
GLUCOSE SERPL-MCNC: 138 MG/DL — HIGH (ref 70–99)
HCT VFR BLD CALC: 28.6 % — LOW (ref 39–50)
HGB BLD-MCNC: 8.9 G/DL — LOW (ref 13–17)
MCHC RBC-ENTMCNC: 30.2 PG — SIGNIFICANT CHANGE UP (ref 27–34)
MCHC RBC-ENTMCNC: 31.1 G/DL — LOW (ref 32–36)
MCV RBC AUTO: 96.9 FL — SIGNIFICANT CHANGE UP (ref 80–100)
NRBC # BLD: 0 /100 WBCS — SIGNIFICANT CHANGE UP (ref 0–0)
PLATELET # BLD AUTO: 310 K/UL — SIGNIFICANT CHANGE UP (ref 150–400)
POTASSIUM SERPL-MCNC: 4 MMOL/L — SIGNIFICANT CHANGE UP (ref 3.5–5.3)
POTASSIUM SERPL-SCNC: 4 MMOL/L — SIGNIFICANT CHANGE UP (ref 3.5–5.3)
RBC # BLD: 2.95 M/UL — LOW (ref 4.2–5.8)
RBC # FLD: 14.7 % — HIGH (ref 10.3–14.5)
SODIUM SERPL-SCNC: 140 MMOL/L — SIGNIFICANT CHANGE UP (ref 135–145)
WBC # BLD: 7.55 K/UL — SIGNIFICANT CHANGE UP (ref 3.8–10.5)
WBC # FLD AUTO: 7.55 K/UL — SIGNIFICANT CHANGE UP (ref 3.8–10.5)

## 2022-12-22 PROCEDURE — 76857 US EXAM PELVIC LIMITED: CPT | Mod: 26

## 2022-12-22 PROCEDURE — 99232 SBSQ HOSP IP/OBS MODERATE 35: CPT

## 2022-12-22 RX ORDER — BUDESONIDE AND FORMOTEROL FUMARATE DIHYDRATE 160; 4.5 UG/1; UG/1
2 AEROSOL RESPIRATORY (INHALATION)
Refills: 0 | Status: DISCONTINUED | OUTPATIENT
Start: 2022-12-22 | End: 2022-12-30

## 2022-12-22 RX ORDER — ALBUTEROL 90 UG/1
2 AEROSOL, METERED ORAL EVERY 6 HOURS
Refills: 0 | Status: DISCONTINUED | OUTPATIENT
Start: 2022-12-22 | End: 2023-02-01

## 2022-12-22 RX ADMIN — Medication 500 MILLIGRAM(S): at 05:21

## 2022-12-22 RX ADMIN — Medication 500 MILLIGRAM(S): at 13:17

## 2022-12-22 RX ADMIN — LEVETIRACETAM 1500 MILLIGRAM(S): 250 TABLET, FILM COATED ORAL at 05:22

## 2022-12-22 RX ADMIN — Medication 1 APPLICATION(S): at 17:26

## 2022-12-22 RX ADMIN — Medication 1 MILLIGRAM(S): at 13:17

## 2022-12-22 RX ADMIN — Medication 1 APPLICATION(S): at 05:21

## 2022-12-22 RX ADMIN — Medication 1 APPLICATION(S): at 17:27

## 2022-12-22 RX ADMIN — CHLORHEXIDINE GLUCONATE 1 APPLICATION(S): 213 SOLUTION TOPICAL at 05:20

## 2022-12-22 RX ADMIN — Medication 25 MILLIGRAM(S): at 17:28

## 2022-12-22 RX ADMIN — PREGABALIN 1000 MICROGRAM(S): 225 CAPSULE ORAL at 13:17

## 2022-12-22 RX ADMIN — Medication 2: at 12:21

## 2022-12-22 RX ADMIN — Medication 500 MILLIGRAM(S): at 21:23

## 2022-12-22 RX ADMIN — Medication 81 MILLIGRAM(S): at 13:17

## 2022-12-22 RX ADMIN — ENOXAPARIN SODIUM 80 MILLIGRAM(S): 100 INJECTION SUBCUTANEOUS at 17:28

## 2022-12-22 RX ADMIN — CHLORHEXIDINE GLUCONATE 15 MILLILITER(S): 213 SOLUTION TOPICAL at 17:29

## 2022-12-22 RX ADMIN — ENOXAPARIN SODIUM 80 MILLIGRAM(S): 100 INJECTION SUBCUTANEOUS at 05:21

## 2022-12-22 RX ADMIN — INSULIN GLARGINE 20 UNIT(S): 100 INJECTION, SOLUTION SUBCUTANEOUS at 09:10

## 2022-12-22 RX ADMIN — CHLORHEXIDINE GLUCONATE 15 MILLILITER(S): 213 SOLUTION TOPICAL at 05:21

## 2022-12-22 RX ADMIN — ATORVASTATIN CALCIUM 20 MILLIGRAM(S): 80 TABLET, FILM COATED ORAL at 21:23

## 2022-12-22 RX ADMIN — LEVETIRACETAM 1500 MILLIGRAM(S): 250 TABLET, FILM COATED ORAL at 17:27

## 2022-12-22 NOTE — PROGRESS NOTE ADULT - ASSESSMENT
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 Unknown how long pt unresponsive for at home. Blood sugar in the 40s with EMS s/p glucagon. On arrival to ER pt hypothermic and agonally breathing. Pt became unresponsive with loss of pulse; ACLS initiated. PEA arrest with ROSC after approximately 5 min s/p Epi x2.  Pt intubated during CODE BLUE. Per son pt on the day prior to presentation reported low blood sugar reads on his glucometer in the range of 80s. Son believes pt continued to take his insulin and oral diabetic regimen. Son states that pt was eating but may have been eating less in the last few days. Later on pt noted to have tonic-clonic Sz.     PEA arrest  s/p Epi x 2. ROSC achieved  CAD/ CHF history:  Takotsubo cardiomyopathy found on Echo   stable  On Coreg, Entresto/ Lasix/ ASA, plavix at home , now on asa, metoprolol   12/13--cardio eval appreciated, will ask to reassess if remains inpt     Acute hypoxemic respiratory failure now chronic  s/p intubation , failed extubation, now trach to vent  c/w vent managment  pulm consulted  daily SBT ;12/13  SBT done, tolerated for 7 hours, placed back on full support due to frequent apnea episodes  12/12 trach sutures removed by surgery   Albuterol and Symbicort added for H/O COPD, asthma      LUE VTE with cellulitis /infected left hand hematoma s/p bedside debridement 12/13   LUE edema and wound of left hand ; good radial pulse   --LUE venous duplex : occlusive thrombus in the left mid subclavian vein with partial   slow flow detected in the lateral subclavian vein.  --LUE arterial Duplex neg   --on lovenox   --vascular following   --12/13 Hand surgery consult appreciated, s/p bedside debridement.   --ID consult appreciated  --antibiotic course completed     Sacral Ulcer  -s/p debridement on 12/19    New onset Seizure likely due to anoxic brain injury  discussed with Dr. Loo, patient will be only on Keppra 1500mg bid   off vimpat and Depakote   monitor     ELMER 2 to ATN; resolved    Shock liver.  due to PEA arrest. resolved    DM2   A1c 8.8%  continue with current management   FS goal 140-180   continue with PEG feeds     Hypophosphatemia --repleted     unstageable sacral pressure wound   s/p debridement     Normocytic anemia   no e/o bleeding or bruising   H/H stable   low normal Vit B12 and folic acid --supplement       Preventative measures   heparin gtt --dvt ppx  fall, aspiration  precautions   HOBE     Dispo: patient is undocumented, will need PRUCOL    Discussed with son at bedside.

## 2022-12-22 NOTE — PROGRESS NOTE ADULT - SUBJECTIVE AND OBJECTIVE BOX
Patient is a 72y old  Male who presents with a chief complaint of s/p cardiac arrest, hypoglycemia (22 Dec 2022 07:44)      INTERVAL HPI/OVERNIGHT EVENTS:  Pt was seen and examined, no acute events.      MEDICATIONS  (STANDING):  aspirin  chewable 81 milliGRAM(s) Oral daily  atorvastatin 20 milliGRAM(s) Oral at bedtime  bacitracin   Ointment 1 Application(s) Topical two times a day  budesonide  80 MICROgram(s)/formoterol 4.5 MICROgram(s) Inhaler 2 Puff(s) Inhalation two times a day  chlorhexidine 0.12% Liquid 15 milliLiter(s) Oral Mucosa every 12 hours  chlorhexidine 2% Cloths 1 Application(s) Topical <User Schedule>  collagenase Ointment 1 Application(s) Topical two times a day  cyanocobalamin 1000 MICROGram(s) Oral daily  enoxaparin Injectable 80 milliGRAM(s) SubCutaneous every 12 hours  folic acid 1 milliGRAM(s) Oral daily  insulin glargine Injectable (LANTUS) 20 Unit(s) SubCutaneous every morning  insulin lispro (ADMELOG) corrective regimen sliding scale   SubCutaneous every 6 hours  levETIRAcetam 1500 milliGRAM(s) Oral two times a day  metoprolol tartrate 25 milliGRAM(s) Oral two times a day  polyethylene glycol 3350 17 Gram(s) Oral daily  senna 2 Tablet(s) Oral at bedtime  silver sulfADIAZINE 1% Cream 1 Application(s) Topical two times a day  valproic  acid Syrup 500 milliGRAM(s) Enteral Tube every 8 hours    MEDICATIONS  (PRN):  acetaminophen     Tablet .. 650 milliGRAM(s) Oral every 6 hours PRN Temp greater or equal to 38C (100.4F), Mild Pain (1 - 3)  albuterol    90 MICROgram(s) HFA Inhaler 2 Puff(s) Inhalation every 6 hours PRN Shortness of Breath and/or Wheezing  aluminum hydroxide/magnesium hydroxide/simethicone Suspension 30 milliLiter(s) Oral every 4 hours PRN Dyspepsia      Allergies  No Known Allergies        Vital Signs Last 24 Hrs  T(C): 37.2 (22 Dec 2022 15:33), Max: 37.9 (21 Dec 2022 23:17)  T(F): 99 (22 Dec 2022 15:33), Max: 100.2 (21 Dec 2022 23:17)  HR: 68 (22 Dec 2022 15:33) (60 - 84)  BP: 110/67 (22 Dec 2022 15:33) (108/68 - 138/73)  BP(mean): --  RR: 20 (22 Dec 2022 15:33) (18 - 20)  SpO2: 100% (22 Dec 2022 15:33) (97% - 100%)    Parameters below as of 22 Dec 2022 15:33  Patient On (Oxygen Delivery Method): ventilator        PHYSICAL EXAM:  GENERAL: NAD  HEAD:  Atraumatic  EYES: PERRLA  ENMT: Mouth moist  NECK: Supple  NERVOUS SYSTEM:  Awake, alert  CHEST/LUNG: Diminished, on vent  HEART: RRR, S1, S2  ABDOMEN: Soft,  non tender, PEG in place  EXTREMITIES:  No edema B/L LE  SKIN: No rash        LABS:                        8.9    7.55  )-----------( 310      ( 22 Dec 2022 07:15 )             28.6     12-22    140  |  100  |  22  ----------------------------<  138<H>  4.0   |  33<H>  |  0.86    Ca    8.9      22 Dec 2022 07:15          CAPILLARY BLOOD GLUCOSE      POCT Blood Glucose.: 182 mg/dL (22 Dec 2022 11:30)  POCT Blood Glucose.: 148 mg/dL (22 Dec 2022 05:23)  POCT Blood Glucose.: 172 mg/dL (21 Dec 2022 23:06)  POCT Blood Glucose.: 143 mg/dL (21 Dec 2022 17:38)      Culture - Surgical Swab (collected 19 Dec 2022 14:30)  Source: .Surgical Swab Surgical Swab  Preliminary Report (21 Dec 2022 20:37):    Rare Enterococcus faecalis      RADIOLOGY & ADDITIONAL TESTS:    Imaging Personally Reviewed:  [ ] YES  [ ] NO    Consultant(s) Notes Reviewed:  [ ] YES  [ ] NO    Care Discussed with Consultants/Other Providers [ ] YES  [ ] NO

## 2022-12-22 NOTE — PROGRESS NOTE ADULT - ASSESSMENT
REVIEW OF SYMPTOMS      Able to give (reliable) ROS  NO     PHYSICAL EXAM    HEENT Unremarkable  atraumatic   RESP Fair air entry EXP prolonged    Harsh breath sound Resp distres mild   CARDIAC S1 S2 No S3     NO JVD    ABDOMEN SOFT BS PRESENT NOT DISTENDED No hepatosplenomegaly   PEDAL EDEMA present No calf tenderness  NO rash       GENERAL DATA .   GOC.  12/11/2022 full code       ALLGY.  nka                            WT. ..  12/2/2022 86  BMI. ..    12/2/2022 29                          ICU STAY.  .. 11/29-12/9  COVID.   .. 12/2/2022 scv2 (-)   .. 11/20/2022 scv2 (-)   BEST PRACTICE ISSUES.    HOB ELEVATN. Yes  DVT PPLX.   12/15/2022 lvnx 80.2 ( l sc thrombus)    ALEJO PPLX.   ..      INFN PPLX.   .. 11/25 chlorhex .12%   .. 11/14 chlorhex 2%   SP SW ANTWAN.         DIET.    ..  12/15 glucerna 1.2 1440 gt   IV fl.  ..            PROCEDURES.  .. 12/19 debridement of sacral wound   .. 12/5/2022 trach  .. 12/5/2022 peg   .. 12/12 r arm midline       ABGS.  12/3/2022 ac 14/450/.3/5 750/46/75      VS/ PO/IO/ VENT/ DRIPS.   12/22/2022 99f 69 110/60   12/22/2022 ac 14/450/5/.3     MAIN ISSUES.  72 m doa 11/14/2022 cac  PMH CAD s/p PCI with stent 2015 and CABG 2014,   PMH  s/p medtronic PPM,   PMH CVA (lacunar infarct)  1) PEA arrest with ROSC after approximately 5 min 11/14  Now communicative   2) DVT 12/12 l Subclav thromS 12/15/2022 lvnx 80.2  3) TRACH 12/5/2022 trach  4) PEG12/5/2022 peg   5) Cellulitis hand absc 12/13 mod enterococcus fecalis  staph epi 12/12-12/15/2022  cephalexin 12/15 vanco once  12/16-12/19 zosyn  6) Vent weaning       PROBLEM ASSESSMENT RECOMMENDATIONS..  Sp  cac 11/14/2022   .. Patient is interactive and answers questions appropriately as dw son on 12/13/2022   VTE.  .. 12/12/2022 Pt found to have l subclav dvt    12/12/2022 iv ufh startd   .. 12/15/2022 changed to lvnx   Trach 12/5   .. trach care   COPD.  .. 12/22/2022 symbicort 80   vent management.  .. vent bundle dsbt dsv target pplat 30 (-) PO2 60 (+) pH 730 (+)   weaming.  .. 12/13-12/14-12/16/2022 tolerated cpap 12/13-12/14-12/16/20 rsbi 78-94-71  .. 12/15/2022 tolerated cpap 4 h  .. 12/18/2022 dw rt Pt not tolerating cpap gets agitated after few min on 12/17    .. 12/22/2022 dw resp and with pt son bedside Pt has not been tolerating cpap   Infection.  ..  Monitor for vap    cellulitis hand   .. absc 12/13 mod enterococcus fecalis  staph epi   .. sp course of abio   CAD.  .. 11/14 asa 81   .. 12/13 metoprolol 25.2   .. Monitor   CHF.   .. 11/14/2022 echo mod decr segmental lvsf apical lateral apiccal ant segment are abn takotsubo cmpthy pasp 42 .  .. monitor for chf   anemia.  .. Hb 12/16-12/17-12/18-12/19-12/20/2022 Hb 8.9 - 9.5- 8.9  - 9.2 - 9.1   .. Monitor  Target Hb 7 (+)   DECUB.  .. 12/2 collagenase   SEIZURES.  .. sz meds as guided by neuro     TIME SPENT   Over 25 minutes aggregate care time spent on encounter; activities included   direct patient care, counseling and/or coordinating care reviewing notes, lab data/ imaging , discussion with multidisciplinary team/ patient  /family and explaining in detail risks, benefits, alternatives  of the recommendations     MARQUISE TAYLOR 72 m 11/14/2022 1948 Dr Leela Dowling

## 2022-12-22 NOTE — PROGRESS NOTE ADULT - SUBJECTIVE AND OBJECTIVE BOX
BRANDON CAMARILLO    [facility] [address]    Allergies    No Known Allergies    Intolerances        PAST MEDICAL & SURGICAL HISTORY:  HTN (hypertension)      HLD (hyperlipidemia)      Diabetes mellitus      Lacunar infarction      BPH (benign prostatic hyperplasia)      CHF (congestive heart failure)      Chronic obstructive pulmonary disease, unspecified COPD type      S/P CABG (coronary artery bypass graft)  2014          FAMILY HISTORY:      Home Medications:  aspirin 81 mg oral delayed release tablet: 1 tab(s) orally once a day (12 Jun 2020 17:35)  Liquifilm Tears preserved ophthalmic solution: 1 drop(s) to each affected eye 2 times a day (12 Jun 2020 17:35)      MEDICATIONS  (STANDING):  aspirin  chewable 81 milliGRAM(s) Oral daily  atorvastatin 20 milliGRAM(s) Oral at bedtime  bacitracin   Ointment 1 Application(s) Topical two times a day  chlorhexidine 0.12% Liquid 15 milliLiter(s) Oral Mucosa every 12 hours  chlorhexidine 2% Cloths 1 Application(s) Topical <User Schedule>  collagenase Ointment 1 Application(s) Topical two times a day  cyanocobalamin 1000 MICROGram(s) Oral daily  enoxaparin Injectable 80 milliGRAM(s) SubCutaneous every 12 hours  folic acid 1 milliGRAM(s) Oral daily  insulin glargine Injectable (LANTUS) 20 Unit(s) SubCutaneous every morning  insulin lispro (ADMELOG) corrective regimen sliding scale   SubCutaneous every 6 hours  levETIRAcetam 1500 milliGRAM(s) Oral two times a day  metoprolol tartrate 25 milliGRAM(s) Oral two times a day  polyethylene glycol 3350 17 Gram(s) Oral daily  senna 2 Tablet(s) Oral at bedtime  silver sulfADIAZINE 1% Cream 1 Application(s) Topical two times a day  valproic  acid Syrup 500 milliGRAM(s) Enteral Tube every 8 hours    MEDICATIONS  (PRN):  acetaminophen     Tablet .. 650 milliGRAM(s) Oral every 6 hours PRN Temp greater or equal to 38C (100.4F), Mild Pain (1 - 3)  aluminum hydroxide/magnesium hydroxide/simethicone Suspension 30 milliLiter(s) Oral every 4 hours PRN Dyspepsia      Diet, NPO with Tube Feed:   Tube Feeding Modality: Gastrostomy  Glucerna 1.2 Pedrito  Total Volume for 24 Hours (mL): 1440  Continuous  Starting Tube Feed Rate mL per Hour: 50  Increase Tube Feed Rate by (mL): 5     Every 8 hours  Until Goal Tube Feed Rate (mL per Hour): 60  Tube Feed Duration (in Hours): 24  Tube Feed Start Time: 13:00 (12-15-22 @ 12:26) [Active]          Vital Signs Last 24 Hrs  T(C): 36.5 (22 Dec 2022 04:38), Max: 37.9 (21 Dec 2022 23:17)  T(F): 97.7 (22 Dec 2022 04:38), Max: 100.2 (21 Dec 2022 23:17)  HR: 62 (22 Dec 2022 06:42) (60 - 90)  BP: 108/68 (22 Dec 2022 04:38) (108/68 - 138/73)  BP(mean): --  RR: 18 (22 Dec 2022 04:38) (18 - 18)  SpO2: 98% (22 Dec 2022 06:42) (98% - 100%)    Parameters below as of 22 Dec 2022 04:38  Patient On (Oxygen Delivery Method): ventilator            Mode: AC/ CMV (Assist Control/ Continuous Mandatory Ventilation), RR (machine): 14, TV (machine): 450, FiO2: 30, PEEP: 5, ITime: 0.8, MAP: 10, PIP: 23      LABS:                        9.0    6.98  )-----------( 282      ( 21 Dec 2022 07:30 )             29.2                     WBC:  WBC Count: 6.98 K/uL (12-21 @ 07:30)  WBC Count: 8.09 K/uL (12-20 @ 08:06)  WBC Count: 8.00 K/uL (12-19 @ 07:17)      MICROBIOLOGY:  RECENT CULTURES:  12-19 .Surgical Swab Surgical Swab XXXX XXXX   Rare Enterococcus faecalis    12-15 .Blood Blood-Peripheral XXXX XXXX   No Growth Final                    Sodium:          Hemoglobin:  Hemoglobin: 9.0 g/dL (12-21 @ 07:30)  Hemoglobin: 9.1 g/dL (12-20 @ 08:06)  Hemoglobin: 9.2 g/dL (12-19 @ 07:17)      Platelets: Platelet Count - Automated: 282 K/uL (12-21 @ 07:30)  Platelet Count - Automated: 270 K/uL (12-20 @ 08:06)  Platelet Count - Automated: 239 K/uL (12-19 @ 07:17)              RADIOLOGY & ADDITIONAL STUDIES:      MICROBIOLOGY:  RECENT CULTURES:  12-19 .Surgical Swab Surgical Swab XXXX XXXX   Rare Enterococcus faecalis    12-15 .Blood Blood-Peripheral XXXX XXXX   No Growth Final

## 2022-12-23 LAB
-  AMPICILLIN: SIGNIFICANT CHANGE UP
-  TETRACYCLINE: SIGNIFICANT CHANGE UP
-  VANCOMYCIN: SIGNIFICANT CHANGE UP
GLUCOSE BLDC GLUCOMTR-MCNC: 122 MG/DL — HIGH (ref 70–99)
GLUCOSE BLDC GLUCOMTR-MCNC: 123 MG/DL — HIGH (ref 70–99)
GLUCOSE BLDC GLUCOMTR-MCNC: 130 MG/DL — HIGH (ref 70–99)
GLUCOSE BLDC GLUCOMTR-MCNC: 162 MG/DL — HIGH (ref 70–99)
GLUCOSE BLDC GLUCOMTR-MCNC: 162 MG/DL — HIGH (ref 70–99)
HCT VFR BLD CALC: 28.4 % — LOW (ref 39–50)
HGB BLD-MCNC: 8.8 G/DL — LOW (ref 13–17)
MCHC RBC-ENTMCNC: 29.9 PG — SIGNIFICANT CHANGE UP (ref 27–34)
MCHC RBC-ENTMCNC: 31 G/DL — LOW (ref 32–36)
MCV RBC AUTO: 96.6 FL — SIGNIFICANT CHANGE UP (ref 80–100)
METHOD TYPE: SIGNIFICANT CHANGE UP
NRBC # BLD: 0 /100 WBCS — SIGNIFICANT CHANGE UP (ref 0–0)
PLATELET # BLD AUTO: 343 K/UL — SIGNIFICANT CHANGE UP (ref 150–400)
RBC # BLD: 2.94 M/UL — LOW (ref 4.2–5.8)
RBC # FLD: 14.7 % — HIGH (ref 10.3–14.5)
WBC # BLD: 7.33 K/UL — SIGNIFICANT CHANGE UP (ref 3.8–10.5)
WBC # FLD AUTO: 7.33 K/UL — SIGNIFICANT CHANGE UP (ref 3.8–10.5)

## 2022-12-23 PROCEDURE — 99232 SBSQ HOSP IP/OBS MODERATE 35: CPT

## 2022-12-23 RX ADMIN — BUDESONIDE AND FORMOTEROL FUMARATE DIHYDRATE 2 PUFF(S): 160; 4.5 AEROSOL RESPIRATORY (INHALATION) at 05:22

## 2022-12-23 RX ADMIN — Medication 650 MILLIGRAM(S): at 17:07

## 2022-12-23 RX ADMIN — Medication 1 APPLICATION(S): at 05:21

## 2022-12-23 RX ADMIN — LEVETIRACETAM 1500 MILLIGRAM(S): 250 TABLET, FILM COATED ORAL at 05:21

## 2022-12-23 RX ADMIN — Medication 650 MILLIGRAM(S): at 09:01

## 2022-12-23 RX ADMIN — CHLORHEXIDINE GLUCONATE 15 MILLILITER(S): 213 SOLUTION TOPICAL at 17:06

## 2022-12-23 RX ADMIN — Medication 1 APPLICATION(S): at 17:09

## 2022-12-23 RX ADMIN — ENOXAPARIN SODIUM 80 MILLIGRAM(S): 100 INJECTION SUBCUTANEOUS at 17:08

## 2022-12-23 RX ADMIN — Medication 500 MILLIGRAM(S): at 17:06

## 2022-12-23 RX ADMIN — Medication 1 MILLIGRAM(S): at 11:36

## 2022-12-23 RX ADMIN — Medication 2: at 11:36

## 2022-12-23 RX ADMIN — LEVETIRACETAM 1500 MILLIGRAM(S): 250 TABLET, FILM COATED ORAL at 17:07

## 2022-12-23 RX ADMIN — Medication 25 MILLIGRAM(S): at 17:06

## 2022-12-23 RX ADMIN — INSULIN GLARGINE 20 UNIT(S): 100 INJECTION, SOLUTION SUBCUTANEOUS at 09:13

## 2022-12-23 RX ADMIN — Medication 500 MILLIGRAM(S): at 22:05

## 2022-12-23 RX ADMIN — BUDESONIDE AND FORMOTEROL FUMARATE DIHYDRATE 2 PUFF(S): 160; 4.5 AEROSOL RESPIRATORY (INHALATION) at 17:30

## 2022-12-23 RX ADMIN — ATORVASTATIN CALCIUM 20 MILLIGRAM(S): 80 TABLET, FILM COATED ORAL at 22:05

## 2022-12-23 RX ADMIN — CHLORHEXIDINE GLUCONATE 15 MILLILITER(S): 213 SOLUTION TOPICAL at 05:22

## 2022-12-23 RX ADMIN — Medication 81 MILLIGRAM(S): at 11:36

## 2022-12-23 RX ADMIN — Medication 500 MILLIGRAM(S): at 05:21

## 2022-12-23 RX ADMIN — PREGABALIN 1000 MICROGRAM(S): 225 CAPSULE ORAL at 11:36

## 2022-12-23 RX ADMIN — Medication 650 MILLIGRAM(S): at 17:52

## 2022-12-23 RX ADMIN — Medication 25 MILLIGRAM(S): at 05:21

## 2022-12-23 RX ADMIN — ENOXAPARIN SODIUM 80 MILLIGRAM(S): 100 INJECTION SUBCUTANEOUS at 05:23

## 2022-12-23 RX ADMIN — CHLORHEXIDINE GLUCONATE 1 APPLICATION(S): 213 SOLUTION TOPICAL at 05:22

## 2022-12-23 NOTE — PROGRESS NOTE ADULT - ASSESSMENT
REVIEW OF SYMPTOMS      Able to give (reliable) ROS  NO     PHYSICAL EXAM    HEENT Unremarkable  atraumatic   RESP Fair air entry EXP prolonged    Harsh breath sound Resp distres mild   CARDIAC S1 S2 No S3     NO JVD    ABDOMEN SOFT BS PRESENT NOT DISTENDED No hepatosplenomegaly   PEDAL EDEMA present No calf tenderness  NO rash       GENERAL DATA .   GOC.  12/11/2022 full code       ALLGY.  nka                            WT. ..  12/2/2022 86  BMI. ..    12/2/2022 29                          ICU STAY.  .. 11/29-12/9  COVID.   .. 12/2/2022 scv2 (-)   .. 11/20/2022 scv2 (-)   BEST PRACTICE ISSUES.    HOB ELEVATN. Yes  DVT PPLX.   12/15/2022 lvnx 80.2 ( l sc thrombus)    ALEJO PPLX.   ..      INFN PPLX.   .. 11/25 chlorhex .12%   .. 11/14 chlorhex 2%   SP SW ANTWAN.         DIET.    ..  12/15 glucerna 1.2 1440 gt   IV fl.  ..            PROCEDURES.  .. 12/19 debridement of sacral wound   .. 12/5/2022 trach  .. 12/5/2022 peg   .. 12/12 r arm midline     PAST PROCEDURES.     ABGS.  12/3/2022 ac 14/450/.3/5 750/46/75      VS/ PO/IO/ VENT/ DRIPS.  12/23/2022 afeb 50 120/70   12/23/2022 ac 14/450/5/.3      MAIN ISSUES.  72 m doa 11/14/2022 cac  PMH CAD s/p PCI with stent 2015 and CABG 2014,   PMH  s/p medtronic PPM,   PMH CVA (lacunar infarct)  1) PEA arrest with ROSC after approximately 5 min 11/14  Now communicative   2) DVT 12/12 l Subclav thromS 12/15/2022 lvnx 80.2  3) TRACH 12/5/2022 trach  4) PEG12/5/2022 peg   5) Cellulitis hand absc 12/13 mod enterococcus fecalis  staph epi 12/12-12/15/2022  cephalexin 12/15 vanco once  12/16-12/19 zosyn  6) Vent weaning       PROBLEM ASSESSMENT RECOMMENDATIONS..  Sp  cac 11/14/2022   .. Patient is interactive and answers questions appropriately as dw son on 12/13/2022   VTE.  .. 12/12/2022 Pt found to have l subclav dvt    12/12/2022 iv ufh startd   .. 12/15/2022 changed to lvnx   Trach 12/5   .. trach care   COPD.  .. 12/22/2022 symbicort 80   vent management.  .. vent bundle dsbt dsv target pplat 30 (-) PO2 60 (+) pH 730 (+)   weaming.  .. 12/13-12/14-12/16/2022 tolerated cpap 12/13-12/14-12/16/20 rsbi 78-94-71  .. 12/15/2022 tolerated cpap 4 h  .. 12/18/2022 dw rt Pt not tolerating cpap gets agitated after few min on 12/17    .. 12/22/2022 dw resp and with pt son bedside Pt has not been tolerating cpap   Infection.  ..  Monitor for vap    cellulitis hand   .. absc 12/13 mod enterococcus fecalis  staph epi   .. sp course of abio   CAD.  .. 11/14 asa 81   .. 12/13 metoprolol 25.2   .. Monitor   CHF.   .. 11/14/2022 echo mod decr segmental lvsf apical lateral apiccal ant segment are abn takotsubo cmpthy pasp 42 .  .. monitor for chf   anemia.  .. Hb 12/16-12/17-12/18-12/19-12/20-12/23/2022 Hb 8.9 - 9.5- 8.9  - 9.2 - 9.1 - 8.8   .. Monitor  Target Hb 7 (+)   DECUB.  .. 12/2 collagenase   SEIZURES.  .. sz meds as guided by neuro   NONVIOLENT NONSELF DESTRUCTIVE LEVEL 1    TIME SPENT   Over 25 minutes aggregate care time spent on encounter; activities included   direct patient care, counseling and/or coordinating care reviewing notes, lab data/ imaging , discussion with multidisciplinary team/ patient  /family and explaining in detail risks, benefits, alternatives  of the recommendations     MARQUISE TAYLOR 72 m 11/14/2022 1948 Dr Leela Dowling

## 2022-12-23 NOTE — CHART NOTE - NSCHARTNOTEFT_GEN_A_CORE
Assessment:  Pt seen in 2C unit, awake, alert, trach->vent, pt's son was @ bedside, expressed desire to drink water.  Rn reported weaning trial today.  Pt adm c dx of hypoglycemia, cardiac arrest, hypothermia, c acute respiratory failure, now chronic, seizures, s/p ELMER, shock liver, E. Coli, UTI, Pt s/p trach, PEG, c LUE VTE c cellulitis, infected left hand hematoma, s/p debridement, sacral ulcer, s/p debridement, new onset seizure, DM, normocytic anemia.  PMH include COPD, CHF, DM( was on Jardiance, Metformin, Victoza pen, Insulin Lispro 10 units, 2 x day, and Insulin Lantus 20 units per medication list provided by son on 11/16), HTN, HLD, CAD, CABG, CKD, CVA, BPH.    Factors impacting intake: [ ] none [ ] nausea  [ ] vomiting [ ] diarrhea [ ] constipation  [ ]chewing problems [ ] swallowing issues  [ x] other: tolerating G-Tube feeding     Diet Prescription: Diet, NPO with Tube Feed:   Tube Feeding Modality: Gastrostomy  Glucerna 1.2 Pedrito  Total Volume for 24 Hours (mL): 1440  Continuous  Starting Tube Feed Rate {mL per Hour}: 50  Increase Tube Feed Rate by (mL): 5     Every 8 hours  Until Goal Tube Feed Rate (mL per Hour): 60  Tube Feed Duration (in Hours): 24  Tube Feed Start Time: 13:00 (12-15-22 @ 12:26)    Intake: Glucerna 1.2 @ goal rate of 60 mL/hr x 24 hrs (1728 pedrito, 86 gm pro, 1166 mL free water, 120% RDIs)     Current Weight: 12/19, 78.8 kg, 12/02, 86.2 kg, 11/14, 82.2 kg, c wt. loss 3.4 kg   % Weight Change: 4.1%(in 1 month)  12/14, 1+ generalized edema, 3+ edema of left arm noted     Nutrition focused physical exam conducted; Subcutaneous fat Exam;  [ Mild  ]  Orbital fat pads region,  [  ]Buccal fat region,  [ Mild   ]triceps region, [  ]ribs region.  Muscle Exam; [ Mild  ]temples region, [ WNL  ]clavicle region, [ WNL  ]shoulder region, [  ]Scapula region, [ Mild  ]Interosseous region, [ WNL  ]thigh region, [ Mild  ]Calf region      Pertinent Medications: MEDICATIONS  (STANDING):  aspirin  chewable 81 milliGRAM(s) Oral daily  atorvastatin 20 milliGRAM(s) Oral at bedtime  bacitracin   Ointment 1 Application(s) Topical two times a day  budesonide  80 MICROgram(s)/formoterol 4.5 MICROgram(s) Inhaler 2 Puff(s) Inhalation two times a day  chlorhexidine 0.12% Liquid 15 milliLiter(s) Oral Mucosa every 12 hours  chlorhexidine 2% Cloths 1 Application(s) Topical <User Schedule>  collagenase Ointment 1 Application(s) Topical two times a day  cyanocobalamin 1000 MICROGram(s) Oral daily  enoxaparin Injectable 80 milliGRAM(s) SubCutaneous every 12 hours  folic acid 1 milliGRAM(s) Oral daily  insulin glargine Injectable (LANTUS) 20 Unit(s) SubCutaneous every morning  insulin lispro (ADMELOG) corrective regimen sliding scale   SubCutaneous every 6 hours  levETIRAcetam 1500 milliGRAM(s) Oral two times a day  metoprolol tartrate 25 milliGRAM(s) Oral two times a day  polyethylene glycol 3350 17 Gram(s) Oral daily  senna 2 Tablet(s) Oral at bedtime  silver sulfADIAZINE 1% Cream 1 Application(s) Topical two times a day  valproic  acid Syrup 500 milliGRAM(s) Enteral Tube every 8 hours    MEDICATIONS  (PRN):  acetaminophen     Tablet .. 650 milliGRAM(s) Oral every 6 hours PRN Temp greater or equal to 38C (100.4F), Mild Pain (1 - 3)  albuterol    90 MICROgram(s) HFA Inhaler 2 Puff(s) Inhalation every 6 hours PRN Shortness of Breath and/or Wheezing  aluminum hydroxide/magnesium hydroxide/simethicone Suspension 30 milliLiter(s) Oral every 4 hours PRN Dyspepsia    Pertinent Labs: 12-22 Na140 mmol/L Glu 138 mg/dL<H> K+ 4.0 mmol/L Cr  0.86 mg/dL BUN 22 mg/dL  11/14, A1C=8.8% <H>    CAPILLARY BLOOD GLUCOSE  POCT Blood Glucose.: 162 mg/dL (23 Dec 2022 11:21)  POCT Blood Glucose.: 122 mg/dL (23 Dec 2022 05:19)  POCT Blood Glucose.: 123 mg/dL (23 Dec 2022 00:04)  POCT Blood Glucose.: 106 mg/dL (22 Dec 2022 21:01)  POCT Blood Glucose.: 85 mg/dL (22 Dec 2022 17:06)    Skin:   12/23; WDL except ecchymotic wound, incontinence/incontinent assoc dermatitis(IAD), pressure injury  12/13, skin tear; left hand wound, 12/04, skin tear; right buttock  Pressure ulcer x   1. 12/20; left ear(12/02; healed note)  2. 12/16, sacrum; stage III  3. 12/13; anterior; neck; tracheal area; unstageable       Estimated Needs:   [ x] no change since previous assessment(11/26 & 11/23)  [ ] recalculated:     Previous New Nutrition Diagnosis: (12/08)  [ x] Increased Nutrient Needs; protein-energy   Related to: increased demand for nutrient   As evidenced by: pressure ulcers  addendum; wounds  Goal: pt to meet >75% protein-energy needs; met   Nutrition Diagnosis is [x ] ongoing(see new related dx below) [ ] resolved [ ] not applicable     New Nutrition Diagnosis:   [x ] Malnutrition; moderate malnutrition in context of acute illness     Related to: increased protein energy needs in setting of cardiac arrest, acute respiratory failure, s/p ELMER, shock liver, pressure ulcers/wounds    As evidenced by: mild muscle/fat loss, 4.1% wt. loss in 1 month    Goal: pt to meet >75% protein-energy needs via tolerated route     Interventions:   Recommend  [ ] Change Diet To:  [ ] Nutrition Supplement  [x ] Nutrition Support; continue c Glucerna 1.2 @ 60 ml/hr via G-Tube  [x] automatic water flush via pump @ 20 ml/hr   [x ] Other: swallow evaluation when appropriate       Monitoring and Evaluation:   [ ] PO intake [ x ] Tolerance to diet prescription [ x ] weights [ x ] labs[ x ] follow up per protocol  [ ] other: Assessment:  Pt seen in 2C unit, awake, alert, trach->vent, pt's son was @ bedside, expressed desire to drink water.  Rn reported weaning trial today.  Pt adm c dx of hypoglycemia, cardiac arrest, hypothermia, c acute respiratory failure, now chronic, seizures, s/p ELMER, shock liver, E. Coli, UTI, Pt s/p trach, PEG, c LUE VTE c cellulitis, infected left hand hematoma, s/p debridement, sacral ulcer, s/p debridement, new onset seizure, DM, normocytic anemia.  PMH include COPD, CHF, DM( was on Jardiance, Metformin, Victoza pen, Insulin Lispro 10 units, 2 x day, and Insulin Lantus 20 units per medication list provided by son on 11/16), HTN, HLD, CAD, CABG, CKD, CVA, BPH.    Factors impacting intake: [ ] none [ ] nausea  [ ] vomiting [ ] diarrhea [ ] constipation  [ ]chewing problems [ ] swallowing issues  [ x] other: tolerating G-Tube feeding     Diet Prescription: Diet, NPO with Tube Feed:   Tube Feeding Modality: Gastrostomy  Glucerna 1.2 Pedrito  Total Volume for 24 Hours (mL): 1440  Continuous  Starting Tube Feed Rate {mL per Hour}: 50  Increase Tube Feed Rate by (mL): 5     Every 8 hours  Until Goal Tube Feed Rate (mL per Hour): 60  Tube Feed Duration (in Hours): 24  Tube Feed Start Time: 13:00 (12-15-22 @ 12:26)    Intake: Glucerna 1.2 @ goal rate of 60 mL/hr x 24 hrs (1728 pedrito, 86 gm pro, 1166 mL free water, 120% RDIs)     Current Weight: 12/19, 78.8 kg, 12/02, 86.2 kg, 11/14, 82.2 kg, c wt. loss 3.4 kg   % Weight Change: 4.1%(in 1 month)  12/14, 1+ generalized edema, 3+ edema of left arm noted     Nutrition focused physical exam conducted; Subcutaneous fat Exam;  [ Mild  ]  Orbital fat pads region,  [  ]Buccal fat region,  [ Mild   ]triceps region, [  ]ribs region.  Muscle Exam; [ Mild  ]temples region, [ WNL  ]clavicle region, [ WNL  ]shoulder region, [  ]Scapula region, [ Mild  ]Interosseous region, [ WNL  ]thigh region, [ Mild  ]Calf region      Pertinent Medications: MEDICATIONS  (STANDING):  aspirin  chewable 81 milliGRAM(s) Oral daily  atorvastatin 20 milliGRAM(s) Oral at bedtime  bacitracin   Ointment 1 Application(s) Topical two times a day  budesonide  80 MICROgram(s)/formoterol 4.5 MICROgram(s) Inhaler 2 Puff(s) Inhalation two times a day  chlorhexidine 0.12% Liquid 15 milliLiter(s) Oral Mucosa every 12 hours  chlorhexidine 2% Cloths 1 Application(s) Topical <User Schedule>  collagenase Ointment 1 Application(s) Topical two times a day  cyanocobalamin 1000 MICROGram(s) Oral daily  enoxaparin Injectable 80 milliGRAM(s) SubCutaneous every 12 hours  folic acid 1 milliGRAM(s) Oral daily  insulin glargine Injectable (LANTUS) 20 Unit(s) SubCutaneous every morning  insulin lispro (ADMELOG) corrective regimen sliding scale   SubCutaneous every 6 hours  levETIRAcetam 1500 milliGRAM(s) Oral two times a day  metoprolol tartrate 25 milliGRAM(s) Oral two times a day  polyethylene glycol 3350 17 Gram(s) Oral daily  senna 2 Tablet(s) Oral at bedtime  silver sulfADIAZINE 1% Cream 1 Application(s) Topical two times a day  valproic  acid Syrup 500 milliGRAM(s) Enteral Tube every 8 hours    MEDICATIONS  (PRN):  acetaminophen     Tablet .. 650 milliGRAM(s) Oral every 6 hours PRN Temp greater or equal to 38C (100.4F), Mild Pain (1 - 3)  albuterol    90 MICROgram(s) HFA Inhaler 2 Puff(s) Inhalation every 6 hours PRN Shortness of Breath and/or Wheezing  aluminum hydroxide/magnesium hydroxide/simethicone Suspension 30 milliLiter(s) Oral every 4 hours PRN Dyspepsia    Pertinent Labs: 12-22 Na140 mmol/L Glu 138 mg/dL<H> K+ 4.0 mmol/L Cr  0.86 mg/dL BUN 22 mg/dL  11/14, A1C=8.8% <H>    CAPILLARY BLOOD GLUCOSE  POCT Blood Glucose.: 162 mg/dL (23 Dec 2022 11:21)  POCT Blood Glucose.: 122 mg/dL (23 Dec 2022 05:19)  POCT Blood Glucose.: 123 mg/dL (23 Dec 2022 00:04)  POCT Blood Glucose.: 106 mg/dL (22 Dec 2022 21:01)  POCT Blood Glucose.: 85 mg/dL (22 Dec 2022 17:06)    Skin:   12/23; WDL except ecchymotic wound, incontinence/incontinent assoc dermatitis(IAD), pressure injury  12/13, skin tear; left hand wound, 12/04, skin tear; right buttock  Pressure ulcer x   1. 12/20; left ear(12/02; healed note)  2. 12/16, sacrum; stage III  3. 12/13; anterior; neck; tracheal area; unstageable       Estimated Needs:   [ x] no change since previous assessment(11/26 & 11/23)  [ ] recalculated:     Previous New Nutrition Diagnosis: (12/08)  [ x] Increased Nutrient Needs; protein-energy   Related to: increased demand for nutrient   As evidenced by: pressure ulcers  addendum; wounds  Goal: pt to meet >75% protein-energy needs; met   Nutrition Diagnosis is [x ] ongoing(see new related dx below) [ ] resolved [ ] not applicable     New Nutrition Diagnosis:   [x ] Malnutrition; moderate malnutrition in context of acute illness     Related to: increased protein energy needs in setting of cardiac arrest, acute respiratory failure, s/p ELMER, shock liver, pressure ulcers/wounds    As evidenced by: mild muscle/fat loss    Goal: pt to meet >75% protein-energy needs via tolerated route     Interventions:   Recommend  [ ] Change Diet To:  [ ] Nutrition Supplement  [x ] Nutrition Support; continue c Glucerna 1.2 @ 60 ml/hr via G-Tube  [x] automatic water flush via pump @ 20 ml/hr   [x ] Other: swallow evaluation when appropriate       Monitoring and Evaluation:   [ ] PO intake [ x ] Tolerance to diet prescription [ x ] weights [ x ] labs[ x ] follow up per protocol  [ ] other:

## 2022-12-23 NOTE — DIETITIAN NUTRITION RISK NOTIFICATION - TREATMENT: THE FOLLOWING DIET HAS BEEN RECOMMENDED
Diet, NPO with Tube Feed:   Tube Feeding Modality: Gastrostomy  Glucerna 1.2 Pedrito  Total Volume for 24 Hours (mL): 1440  Continuous  Until Goal Tube Feed Rate (mL per Hour): 60  Tube Feed Duration (in Hours): 24  Tube Feed Start Time: 12:15  Free Water Flush Instructions:  automatic water flus via pump @ 20 ml/hr (12-23-22 @ 12:08) [Pending Verification By Attending]  Diet, NPO with Tube Feed:   Tube Feeding Modality: Gastrostomy  Glucerna 1.2 Pedrito  Total Volume for 24 Hours (mL): 1440  Continuous  Starting Tube Feed Rate {mL per Hour}: 50  Increase Tube Feed Rate by (mL): 5     Every 8 hours  Until Goal Tube Feed Rate (mL per Hour): 60  Tube Feed Duration (in Hours): 24  Tube Feed Start Time: 13:00 (12-15-22 @ 12:26) [Active]

## 2022-12-23 NOTE — PROGRESS NOTE ADULT - SUBJECTIVE AND OBJECTIVE BOX
BRANDON CAMARILLO    [facility] [address]    Allergies    No Known Allergies    Intolerances        PAST MEDICAL & SURGICAL HISTORY:  HTN (hypertension)      HLD (hyperlipidemia)      Diabetes mellitus      Lacunar infarction      BPH (benign prostatic hyperplasia)      CHF (congestive heart failure)      Chronic obstructive pulmonary disease, unspecified COPD type      S/P CABG (coronary artery bypass graft)  2014          FAMILY HISTORY:      Home Medications:  aspirin 81 mg oral delayed release tablet: 1 tab(s) orally once a day (12 Jun 2020 17:35)  Liquifilm Tears preserved ophthalmic solution: 1 drop(s) to each affected eye 2 times a day (12 Jun 2020 17:35)      MEDICATIONS  (STANDING):  aspirin  chewable 81 milliGRAM(s) Oral daily  atorvastatin 20 milliGRAM(s) Oral at bedtime  bacitracin   Ointment 1 Application(s) Topical two times a day  budesonide  80 MICROgram(s)/formoterol 4.5 MICROgram(s) Inhaler 2 Puff(s) Inhalation two times a day  chlorhexidine 0.12% Liquid 15 milliLiter(s) Oral Mucosa every 12 hours  chlorhexidine 2% Cloths 1 Application(s) Topical <User Schedule>  collagenase Ointment 1 Application(s) Topical two times a day  cyanocobalamin 1000 MICROGram(s) Oral daily  enoxaparin Injectable 80 milliGRAM(s) SubCutaneous every 12 hours  folic acid 1 milliGRAM(s) Oral daily  insulin glargine Injectable (LANTUS) 20 Unit(s) SubCutaneous every morning  insulin lispro (ADMELOG) corrective regimen sliding scale   SubCutaneous every 6 hours  levETIRAcetam 1500 milliGRAM(s) Oral two times a day  metoprolol tartrate 25 milliGRAM(s) Oral two times a day  polyethylene glycol 3350 17 Gram(s) Oral daily  senna 2 Tablet(s) Oral at bedtime  silver sulfADIAZINE 1% Cream 1 Application(s) Topical two times a day  valproic  acid Syrup 500 milliGRAM(s) Enteral Tube every 8 hours    MEDICATIONS  (PRN):  acetaminophen     Tablet .. 650 milliGRAM(s) Oral every 6 hours PRN Temp greater or equal to 38C (100.4F), Mild Pain (1 - 3)  albuterol    90 MICROgram(s) HFA Inhaler 2 Puff(s) Inhalation every 6 hours PRN Shortness of Breath and/or Wheezing  aluminum hydroxide/magnesium hydroxide/simethicone Suspension 30 milliLiter(s) Oral every 4 hours PRN Dyspepsia      Diet, NPO with Tube Feed:   Tube Feeding Modality: Gastrostomy  Glucerna 1.2 Pedrito  Total Volume for 24 Hours (mL): 1440  Continuous  Starting Tube Feed Rate mL per Hour: 50  Increase Tube Feed Rate by (mL): 5     Every 8 hours  Until Goal Tube Feed Rate (mL per Hour): 60  Tube Feed Duration (in Hours): 24  Tube Feed Start Time: 13:00 (12-15-22 @ 12:26) [Active]          Vital Signs Last 24 Hrs  T(C): 36.4 (23 Dec 2022 04:36), Max: 37.4 (22 Dec 2022 10:58)  T(F): 97.5 (23 Dec 2022 04:36), Max: 99.4 (22 Dec 2022 10:58)  HR: 72 (23 Dec 2022 06:18) (64 - 83)  BP: 122/56 (23 Dec 2022 04:36) (110/67 - 122/56)  BP(mean): --  RR: 18 (23 Dec 2022 04:36) (18 - 20)  SpO2: 100% (23 Dec 2022 06:18) (97% - 100%)    Parameters below as of 23 Dec 2022 06:18  Patient On (Oxygen Delivery Method): ventilator          12-22-22 @ 07:01  -  12-23-22 @ 06:54  --------------------------------------------------------  IN: 0 mL / OUT: 1 mL / NET: -1 mL        Mode: AC/ CMV (Assist Control/ Continuous Mandatory Ventilation), RR (machine): 14, TV (machine): 450, FiO2: 30, PEEP: 5, ITime: 1, MAP: 10, PIP: 32      LABS:                        8.9    7.55  )-----------( 310      ( 22 Dec 2022 07:15 )             28.6     12-22    140  |  100  |  22  ----------------------------<  138<H>  4.0   |  33<H>  |  0.86    Ca    8.9      22 Dec 2022 07:15                WBC:  WBC Count: 7.55 K/uL (12-22 @ 07:15)  WBC Count: 6.98 K/uL (12-21 @ 07:30)  WBC Count: 8.09 K/uL (12-20 @ 08:06)  WBC Count: 8.00 K/uL (12-19 @ 07:17)      MICROBIOLOGY:  RECENT CULTURES:  12-19 .Surgical Swab Surgical Swab XXXX [id number]   Rare Enterococcus faecalis                    Sodium:  Sodium, Serum: 140 mmol/L (12-22 @ 07:15)      0.86 mg/dL 12-22 @ 07:15      Hemoglobin:  Hemoglobin: 8.9 g/dL (12-22 @ 07:15)  Hemoglobin: 9.0 g/dL (12-21 @ 07:30)  Hemoglobin: 9.1 g/dL (12-20 @ 08:06)  Hemoglobin: 9.2 g/dL (12-19 @ 07:17)      Platelets: Platelet Count - Automated: 310 K/uL (12-22 @ 07:15)  Platelet Count - Automated: 282 K/uL (12-21 @ 07:30)  Platelet Count - Automated: 270 K/uL (12-20 @ 08:06)  Platelet Count - Automated: 239 K/uL (12-19 @ 07:17)              RADIOLOGY & ADDITIONAL STUDIES:      MICROBIOLOGY:  RECENT CULTURES:  12-19 .Surgical Swab Surgical Swab XXXX [id number]   Rare Enterococcus faecalis

## 2022-12-23 NOTE — PROGRESS NOTE ADULT - SUBJECTIVE AND OBJECTIVE BOX
Patient is a 72y old  Male who presents with a chief complaint of s/p cardiac arrest, hypoglycemia (23 Dec 2022 06:53)      INTERVAL HPI/OVERNIGHT EVENTS:  Pt was seen and examined, no acute events.      MEDICATIONS  (STANDING):  aspirin  chewable 81 milliGRAM(s) Oral daily  atorvastatin 20 milliGRAM(s) Oral at bedtime  bacitracin   Ointment 1 Application(s) Topical two times a day  budesonide  80 MICROgram(s)/formoterol 4.5 MICROgram(s) Inhaler 2 Puff(s) Inhalation two times a day  chlorhexidine 0.12% Liquid 15 milliLiter(s) Oral Mucosa every 12 hours  chlorhexidine 2% Cloths 1 Application(s) Topical <User Schedule>  collagenase Ointment 1 Application(s) Topical two times a day  cyanocobalamin 1000 MICROGram(s) Oral daily  enoxaparin Injectable 80 milliGRAM(s) SubCutaneous every 12 hours  folic acid 1 milliGRAM(s) Oral daily  insulin glargine Injectable (LANTUS) 20 Unit(s) SubCutaneous every morning  insulin lispro (ADMELOG) corrective regimen sliding scale   SubCutaneous every 6 hours  levETIRAcetam 1500 milliGRAM(s) Oral two times a day  metoprolol tartrate 25 milliGRAM(s) Oral two times a day  polyethylene glycol 3350 17 Gram(s) Oral daily  senna 2 Tablet(s) Oral at bedtime  silver sulfADIAZINE 1% Cream 1 Application(s) Topical two times a day  valproic  acid Syrup 500 milliGRAM(s) Enteral Tube every 8 hours    MEDICATIONS  (PRN):  acetaminophen     Tablet .. 650 milliGRAM(s) Oral every 6 hours PRN Temp greater or equal to 38C (100.4F), Mild Pain (1 - 3)  albuterol    90 MICROgram(s) HFA Inhaler 2 Puff(s) Inhalation every 6 hours PRN Shortness of Breath and/or Wheezing  aluminum hydroxide/magnesium hydroxide/simethicone Suspension 30 milliLiter(s) Oral every 4 hours PRN Dyspepsia      Allergies  No Known Allergies        Vital Signs Last 24 Hrs  T(C): 37.9 (23 Dec 2022 16:16), Max: 37.9 (23 Dec 2022 16:16)  T(F): 100.3 (23 Dec 2022 16:16), Max: 100.3 (23 Dec 2022 16:16)  HR: 84 (23 Dec 2022 17:55) (53 - 88)  BP: 122/77 (23 Dec 2022 16:16) (110/69 - 124/71)  BP(mean): --  RR: 18 (23 Dec 2022 16:16) (18 - 20)  SpO2: 99% (23 Dec 2022 17:55) (98% - 100%)    Parameters below as of 23 Dec 2022 17:55  Patient On (Oxygen Delivery Method): ventilator        PHYSICAL EXAM:  GENERAL: NAD  HEAD:  Atraumatic  EYES: PERRLA  ENMT: Mouth moist  NECK: Supple  NERVOUS SYSTEM:  Awake, alert  CHEST/LUNG: Diminished, on vent  HEART: RRR, S1, S2  ABDOMEN: Soft,  non tender, PEG in place  EXTREMITIES:  No edema B/L LE  SKIN: No rash        LABS:                        8.8    7.33  )-----------( 343      ( 23 Dec 2022 06:54 )             28.4     12-22    140  |  100  |  22  ----------------------------<  138<H>  4.0   |  33<H>  |  0.86    Ca    8.9      22 Dec 2022 07:15          CAPILLARY BLOOD GLUCOSE      POCT Blood Glucose.: 130 mg/dL (23 Dec 2022 16:45)  POCT Blood Glucose.: 162 mg/dL (23 Dec 2022 11:21)  POCT Blood Glucose.: 122 mg/dL (23 Dec 2022 05:19)  POCT Blood Glucose.: 123 mg/dL (23 Dec 2022 00:04)  POCT Blood Glucose.: 106 mg/dL (22 Dec 2022 21:01)      Culture - Surgical Swab (collected 19 Dec 2022 14:30)  Source: .Surgical Swab Surgical Swab  Preliminary Report (21 Dec 2022 20:37):    Rare Enterococcus faecalis  Organism: Enterococcus faecalis (23 Dec 2022 17:19)  Organism: Enterococcus faecalis (23 Dec 2022 17:19)      RADIOLOGY & ADDITIONAL TESTS:    Imaging Personally Reviewed:  [ ] YES  [ ] NO    Consultant(s) Notes Reviewed:  [ ] YES  [ ] NO    Care Discussed with Consultants/Other Providers [ ] YES  [ ] NO

## 2022-12-23 NOTE — PROGRESS NOTE ADULT - ASSESSMENT
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 Unknown how long pt unresponsive for at home. Blood sugar in the 40s with EMS s/p glucagon. On arrival to ER pt hypothermic and agonally breathing. Pt became unresponsive with loss of pulse; ACLS initiated. PEA arrest with ROSC after approximately 5 min s/p Epi x2.  Pt intubated during CODE BLUE. Per son pt on the day prior to presentation reported low blood sugar reads on his glucometer in the range of 80s. Son believes pt continued to take his insulin and oral diabetic regimen. Son states that pt was eating but may have been eating less in the last few days. Later on pt noted to have tonic-clonic Sz.     PEA arrest  s/p Epi x 2. ROSC achieved  CAD/ CHF history:  Takotsubo cardiomyopathy found on Echo   stable  On Coreg, Entresto/ Lasix/ ASA, plavix at home , now on asa, metoprolol   12/13--cardio eval appreciated, will ask to reassess if remains inpt     Acute hypoxemic respiratory failure now chronic  s/p intubation , failed extubation, now trach to vent  c/w vent managment  pulm consulted  Continue weaning trial  12/12 trach sutures removed by surgery   Albuterol and Symbicort added for H/O COPD, asthma      LUE VTE with cellulitis /infected left hand hematoma s/p bedside debridement 12/13   LUE edema and wound of left hand ; good radial pulse   --LUE venous duplex : occlusive thrombus in the left mid subclavian vein with partial   slow flow detected in the lateral subclavian vein.  --LUE arterial Duplex neg   --on lovenox   --vascular following   --12/13 Hand surgery consult appreciated, s/p bedside debridement.   --ID consult appreciated  --antibiotic course completed     Sacral Ulcer  -s/p debridement on 12/19    New onset Seizure likely due to anoxic brain injury  discussed with Dr. Loo, patient will be only on Keppra 1500mg bid   off vimpat and Depakote   monitor     ELMER 2 to ATN; resolved    Shock liver.  due to PEA arrest. resolved    DM2   A1c 8.8%  continue with current management   FS goal 140-180   continue with PEG feeds     Hypophosphatemia --repleted     unstageable sacral pressure wound   s/p debridement     Normocytic anemia   no e/o bleeding or bruising   H/H stable   low normal Vit B12 and folic acid --supplement       Preventative measures   heparin gtt --dvt ppx  fall, aspiration  precautions , remove mittens.   HOBE     Dispo: patient is undocumented, will need PRUCOL    Discussed with son at bedside.

## 2022-12-24 LAB
CULTURE RESULTS: SIGNIFICANT CHANGE UP
GLUCOSE BLDC GLUCOMTR-MCNC: 138 MG/DL — HIGH (ref 70–99)
GLUCOSE BLDC GLUCOMTR-MCNC: 140 MG/DL — HIGH (ref 70–99)
GLUCOSE BLDC GLUCOMTR-MCNC: 160 MG/DL — HIGH (ref 70–99)
GLUCOSE BLDC GLUCOMTR-MCNC: 162 MG/DL — HIGH (ref 70–99)
HCT VFR BLD CALC: 33 % — LOW (ref 39–50)
HGB BLD-MCNC: 10.2 G/DL — LOW (ref 13–17)
MCHC RBC-ENTMCNC: 30.6 PG — SIGNIFICANT CHANGE UP (ref 27–34)
MCHC RBC-ENTMCNC: 30.9 G/DL — LOW (ref 32–36)
MCV RBC AUTO: 99.1 FL — SIGNIFICANT CHANGE UP (ref 80–100)
NRBC # BLD: 0 /100 WBCS — SIGNIFICANT CHANGE UP (ref 0–0)
ORGANISM # SPEC MICROSCOPIC CNT: SIGNIFICANT CHANGE UP
ORGANISM # SPEC MICROSCOPIC CNT: SIGNIFICANT CHANGE UP
PLATELET # BLD AUTO: 335 K/UL — SIGNIFICANT CHANGE UP (ref 150–400)
RBC # BLD: 3.33 M/UL — LOW (ref 4.2–5.8)
RBC # FLD: 14.8 % — HIGH (ref 10.3–14.5)
SPECIMEN SOURCE: SIGNIFICANT CHANGE UP
WBC # BLD: 8.99 K/UL — SIGNIFICANT CHANGE UP (ref 3.8–10.5)
WBC # FLD AUTO: 8.99 K/UL — SIGNIFICANT CHANGE UP (ref 3.8–10.5)

## 2022-12-24 PROCEDURE — 99232 SBSQ HOSP IP/OBS MODERATE 35: CPT

## 2022-12-24 RX ADMIN — Medication 1 APPLICATION(S): at 06:32

## 2022-12-24 RX ADMIN — BUDESONIDE AND FORMOTEROL FUMARATE DIHYDRATE 2 PUFF(S): 160; 4.5 AEROSOL RESPIRATORY (INHALATION) at 17:01

## 2022-12-24 RX ADMIN — ENOXAPARIN SODIUM 80 MILLIGRAM(S): 100 INJECTION SUBCUTANEOUS at 17:32

## 2022-12-24 RX ADMIN — Medication 1 MILLIGRAM(S): at 12:14

## 2022-12-24 RX ADMIN — Medication 81 MILLIGRAM(S): at 12:14

## 2022-12-24 RX ADMIN — ATORVASTATIN CALCIUM 20 MILLIGRAM(S): 80 TABLET, FILM COATED ORAL at 21:34

## 2022-12-24 RX ADMIN — Medication 25 MILLIGRAM(S): at 05:15

## 2022-12-24 RX ADMIN — Medication 1 APPLICATION(S): at 05:16

## 2022-12-24 RX ADMIN — Medication 500 MILLIGRAM(S): at 05:16

## 2022-12-24 RX ADMIN — ENOXAPARIN SODIUM 80 MILLIGRAM(S): 100 INJECTION SUBCUTANEOUS at 05:15

## 2022-12-24 RX ADMIN — Medication 500 MILLIGRAM(S): at 13:06

## 2022-12-24 RX ADMIN — CHLORHEXIDINE GLUCONATE 15 MILLILITER(S): 213 SOLUTION TOPICAL at 17:30

## 2022-12-24 RX ADMIN — INSULIN GLARGINE 20 UNIT(S): 100 INJECTION, SOLUTION SUBCUTANEOUS at 09:15

## 2022-12-24 RX ADMIN — Medication 2: at 00:20

## 2022-12-24 RX ADMIN — CHLORHEXIDINE GLUCONATE 15 MILLILITER(S): 213 SOLUTION TOPICAL at 05:16

## 2022-12-24 RX ADMIN — Medication 1 APPLICATION(S): at 17:30

## 2022-12-24 RX ADMIN — Medication 1 APPLICATION(S): at 05:14

## 2022-12-24 RX ADMIN — Medication 2: at 06:33

## 2022-12-24 RX ADMIN — LEVETIRACETAM 1500 MILLIGRAM(S): 250 TABLET, FILM COATED ORAL at 05:14

## 2022-12-24 RX ADMIN — CHLORHEXIDINE GLUCONATE 1 APPLICATION(S): 213 SOLUTION TOPICAL at 05:14

## 2022-12-24 RX ADMIN — Medication 25 MILLIGRAM(S): at 17:31

## 2022-12-24 RX ADMIN — BUDESONIDE AND FORMOTEROL FUMARATE DIHYDRATE 2 PUFF(S): 160; 4.5 AEROSOL RESPIRATORY (INHALATION) at 05:21

## 2022-12-24 RX ADMIN — Medication 500 MILLIGRAM(S): at 21:35

## 2022-12-24 RX ADMIN — PREGABALIN 1000 MICROGRAM(S): 225 CAPSULE ORAL at 12:14

## 2022-12-24 RX ADMIN — LEVETIRACETAM 1500 MILLIGRAM(S): 250 TABLET, FILM COATED ORAL at 17:32

## 2022-12-24 RX ADMIN — Medication 650 MILLIGRAM(S): at 21:34

## 2022-12-24 RX ADMIN — ALBUTEROL 2 PUFF(S): 90 AEROSOL, METERED ORAL at 16:08

## 2022-12-24 RX ADMIN — Medication 1 APPLICATION(S): at 17:33

## 2022-12-24 NOTE — PROGRESS NOTE ADULT - ASSESSMENT
REVIEW OF SYMPTOMS      Able to give (reliable) ROS  NO     PHYSICAL EXAM    HEENT Unremarkable  atraumatic   RESP Fair air entry EXP prolonged    Harsh breath sound Resp distres mild   CARDIAC S1 S2 No S3     NO JVD    ABDOMEN SOFT BS PRESENT NOT DISTENDED No hepatosplenomegaly   PEDAL EDEMA present No calf tenderness  NO rash     GENERAL DATA .   GOC.  12/11/2022 full code       ALLGY.  nka                            WT. ..  12/2/2022 86  BMI. ..    12/2/2022 29                          ICU STAY.  .. 11/29-12/9  COVID.   .. 12/2/2022 scv2 (-)   .. 11/20/2022 scv2 (-)   BEST PRACTICE ISSUES.    HOB ELEVATN. Yes  DVT PPLX.   12/15/2022 lvnx 80.2 ( l sc thrombus)    ALEJO PPLX.   ..      INFN PPLX.   .. 11/25 chlorhex .12%   .. 11/14 chlorhex 2%   SP SW ANTWAN.         DIET.    ..  12/15 glucerna 1.2 1440 gt   IV fl.  ..            PROCEDURES.  .. 12/19 debridement of sacral wound     ABGS.  12/3/2022 ac 14/450/.3/5 750/46/75      VS/ PO/IO/ VENT/ DRIPS.  12/24/2022 afeb 50 110/60   12/24/2022 9a cpap 5/10/30 r 15 svt 351 rsbi 45    MAIN ISSUES.  72 m doa 11/14/2022 cac  PMH CAD s/p PCI with stent 2015 and CABG 2014,   PMH  s/p medtronic PPM,   PMH CVA (lacunar infarct)  1) PEA arrest with ROSC after approximately 5 min 11/14  Now communicative   2) DVT 12/12 l Subclav thromS 12/15/2022 lvnx 80.2  3) TRACH 12/5/2022 trach  4) PEG12/5/2022 peg   5) Cellulitis hand absc 12/13 mod enterococcus fecalis  staph epi 12/12-12/15/2022  cephalexin 12/15 vanco once  12/16-12/19 zosyn  6) Vent weaning       PROBLEM ASSESSMENT RECOMMENDATIONS..  Sp  cac 11/14/2022   .. Patient is interactive and answers questions appropriately as dw son on 12/13/2022   VTE.  .. 12/12/2022 Pt found to have l subclav dvt    12/12/2022 iv ufh startd   .. 12/15/2022 changed to lvnx   Trach 12/5   .. trach care   COPD.  .. 12/22/2022 symbicort 80   vent management.  .. vent bundle dsbt dsv target pplat 30 (-) PO2 60 (+) pH 730 (+)   weaming.  .. 12/13-12/14-12/16/2022 tolerated cpap 12/13-12/14-12/16/20 rsbi 78-94-71  .. 12/15/2022 tolerated cpap 4 h  .. 12/18/2022 dw rt Pt not tolerating cpap gets agitated after few min on 12/17    .. 12/22/2022 dw resp and with pt son bedside Pt has not been tolerating cpap   .. 12/24/2022 dw rt Try cpap 5 ps5 and if toerates will place on tc 12/25   Infection.  ..  Monitor for vap    cellulitis hand   .. w 12/24/2022 w 8.9   .. absc 12/13 mod enterococcus fecalis  staph epi   .. sp course of abio   CAD.  .. 11/14 asa 81   .. 12/13 metoprolol 25.2   .. Monitor   CHF.   .. 11/14/2022 echo mod decr segmental lvsf apical lateral apiccal ant segment are abn takotsubo cmpthy pasp 42 .  .. monitor for chf   Dysphagia.  .. 12/24/2022 speech eval requestd   anemia.  .. Hb 12/16-12/17-12/18-12/19-12/20-12/23/2022   Hb 8.9 - 9.5- 8.9  - 9.2 - 9.1 - 8.8   .. Monitor  Target Hb 7 (+)   DECUB.  .. 12/2 collagenase   SEIZURES.  .. sz meds as guided by neuro     TIME SPENT   Over 25 minutes aggregate care time spent on encounter; activities included   direct patient care, counseling and/or coordinating care reviewing notes, lab data/ imaging , discussion with multidisciplinary team/ patient  /family and explaining in detail risks, benefits, alternatives  of the recommendations     MARQUISE TAYLOR 72 m 11/14/2022 1948 Dr Leela Dowling

## 2022-12-24 NOTE — PROGRESS NOTE ADULT - SUBJECTIVE AND OBJECTIVE BOX
BRANDON CAMARILLO    [facility] [address]    Allergies    No Known Allergies    Intolerances        PAST MEDICAL & SURGICAL HISTORY:  HTN (hypertension)      HLD (hyperlipidemia)      Diabetes mellitus      Lacunar infarction      BPH (benign prostatic hyperplasia)      CHF (congestive heart failure)      Chronic obstructive pulmonary disease, unspecified COPD type      S/P CABG (coronary artery bypass graft)  2014          FAMILY HISTORY:      Home Medications:  aspirin 81 mg oral delayed release tablet: 1 tab(s) orally once a day (12 Jun 2020 17:35)  Liquifilm Tears preserved ophthalmic solution: 1 drop(s) to each affected eye 2 times a day (12 Jun 2020 17:35)      MEDICATIONS  (STANDING):  aspirin  chewable 81 milliGRAM(s) Oral daily  atorvastatin 20 milliGRAM(s) Oral at bedtime  bacitracin   Ointment 1 Application(s) Topical two times a day  budesonide  80 MICROgram(s)/formoterol 4.5 MICROgram(s) Inhaler 2 Puff(s) Inhalation two times a day  chlorhexidine 0.12% Liquid 15 milliLiter(s) Oral Mucosa every 12 hours  chlorhexidine 2% Cloths 1 Application(s) Topical <User Schedule>  collagenase Ointment 1 Application(s) Topical two times a day  cyanocobalamin 1000 MICROGram(s) Oral daily  enoxaparin Injectable 80 milliGRAM(s) SubCutaneous every 12 hours  folic acid 1 milliGRAM(s) Oral daily  insulin glargine Injectable (LANTUS) 20 Unit(s) SubCutaneous every morning  insulin lispro (ADMELOG) corrective regimen sliding scale   SubCutaneous every 6 hours  levETIRAcetam 1500 milliGRAM(s) Oral two times a day  metoprolol tartrate 25 milliGRAM(s) Oral two times a day  polyethylene glycol 3350 17 Gram(s) Oral daily  senna 2 Tablet(s) Oral at bedtime  silver sulfADIAZINE 1% Cream 1 Application(s) Topical two times a day  valproic  acid Syrup 500 milliGRAM(s) Enteral Tube every 8 hours    MEDICATIONS  (PRN):  acetaminophen     Tablet .. 650 milliGRAM(s) Oral every 6 hours PRN Temp greater or equal to 38C (100.4F), Mild Pain (1 - 3)  albuterol    90 MICROgram(s) HFA Inhaler 2 Puff(s) Inhalation every 6 hours PRN Shortness of Breath and/or Wheezing  aluminum hydroxide/magnesium hydroxide/simethicone Suspension 30 milliLiter(s) Oral every 4 hours PRN Dyspepsia      Diet, NPO with Tube Feed:   Tube Feeding Modality: Gastrostomy  Glucerna 1.2 Pedrito  Total Volume for 24 Hours (mL): 1440  Continuous  Until Goal Tube Feed Rate (mL per Hour): 60  Tube Feed Duration (in Hours): 24  Tube Feed Start Time: 12:15  Free Water Flush Instructions:  automatic water flus via pump @ 20 ml/hr (12-23-22 @ 12:08) [Active]          Vital Signs Last 24 Hrs  T(C): 37.3 (24 Dec 2022 04:59), Max: 37.9 (23 Dec 2022 16:16)  T(F): 99.1 (24 Dec 2022 04:59), Max: 100.3 (23 Dec 2022 16:16)  HR: 63 (24 Dec 2022 05:53) (52 - 88)  BP: 121/61 (24 Dec 2022 04:59) (108/60 - 124/71)  BP(mean): --  RR: 18 (24 Dec 2022 04:59) (17 - 20)  SpO2: 100% (24 Dec 2022 05:53) (98% - 100%)    Parameters below as of 24 Dec 2022 04:59      O2 Concentration (%): 30      12-23-22 @ 07:01  -  12-24-22 @ 07:00  --------------------------------------------------------  IN: 970 mL / OUT: 0 mL / NET: 970 mL        Mode: AC/ CMV (Assist Control/ Continuous Mandatory Ventilation), RR (machine): 14, TV (machine): 450, FiO2: 30, PEEP: 5, ITime: 1, MAP: 9, PIP: 22      LABS:                        8.8    7.33  )-----------( 343      ( 23 Dec 2022 06:54 )             28.4                     WBC:  WBC Count: 7.33 K/uL (12-23 @ 06:54)  WBC Count: 7.55 K/uL (12-22 @ 07:15)  WBC Count: 6.98 K/uL (12-21 @ 07:30)      MICROBIOLOGY:  RECENT CULTURES:  12-19 .Surgical Swab Surgical Swab Enterococcus faecalis XXXX   Rare Enterococcus faecalis                    Sodium:  Sodium, Serum: 140 mmol/L (12-22 @ 07:15)      0.86 mg/dL 12-22 @ 07:15      Hemoglobin:  Hemoglobin: 8.8 g/dL (12-23 @ 06:54)  Hemoglobin: 8.9 g/dL (12-22 @ 07:15)  Hemoglobin: 9.0 g/dL (12-21 @ 07:30)      Platelets: Platelet Count - Automated: 343 K/uL (12-23 @ 06:54)  Platelet Count - Automated: 310 K/uL (12-22 @ 07:15)  Platelet Count - Automated: 282 K/uL (12-21 @ 07:30)              RADIOLOGY & ADDITIONAL STUDIES:      MICROBIOLOGY:  RECENT CULTURES:  12-19 .Surgical Swab Surgical Swab Enterococcus faecalis XXXX   Rare Enterococcus faecalis

## 2022-12-24 NOTE — PROGRESS NOTE ADULT - ASSESSMENT
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 Unknown how long pt unresponsive for at home. Blood sugar in the 40s with EMS s/p glucagon. On arrival to ER pt hypothermic and agonally breathing. Pt became unresponsive with loss of pulse; ACLS initiated. PEA arrest with ROSC after approximately 5 min s/p Epi x2.  Pt intubated during CODE BLUE. Per son pt on the day prior to presentation reported low blood sugar reads on his glucometer in the range of 80s. Son believes pt continued to take his insulin and oral diabetic regimen. Son states that pt was eating but may have been eating less in the last few days. Later on pt noted to have tonic-clonic Sz.     PEA arrest  s/p Epi x 2. ROSC achieved  CAD/ CHF history:  Takotsubo cardiomyopathy found on Echo   stable  On Coreg, Entresto/ Lasix/ ASA, plavix at home , now on asa, metoprolol   12/13--cardio eval appreciated, will ask to reassess if remains inpt     Acute hypoxemic respiratory failure now chronic  s/p intubation , failed extubation, now trach to vent  c/w vent managment  pulm consulted  Continue weaning trial, possibly can switch to trach collar next wk  12/12 trach sutures removed by surgery   Albuterol and Symbicort added for H/O COPD, asthma      LUE VTE with cellulitis /infected left hand hematoma s/p bedside debridement 12/13   LUE edema and wound of left hand ; good radial pulse   --LUE venous duplex : occlusive thrombus in the left mid subclavian vein with partial   slow flow detected in the lateral subclavian vein.  --LUE arterial Duplex neg   --on lovenox   --vascular following   --12/13 Hand surgery consult appreciated, s/p bedside debridement.   --ID consult appreciated  --antibiotic course completed     Sacral Ulcer  -s/p debridement on 12/19    New onset Seizure likely due to anoxic brain injury  discussed with Dr. Loo, patient will be only on Keppra 1500mg bid   off vimpat and Depakote   monitor     ELMER 2 to ATN; resolved    Shock liver.  due to PEA arrest. resolved    DM2   A1c 8.8%  continue with current management   FS goal 140-180   continue with PEG feeds     Hypophosphatemia --repleted     unstageable sacral pressure wound   s/p debridement     Normocytic anemia   no e/o bleeding or bruising   H/H stable   low normal Vit B12 and folic acid --supplement       Preventative measures   heparin gtt --dvt ppx  fall, aspiration  precautions , remove mittens.   HOBE     Dispo: patient is undocumented, will need PRUCOL    Discussed with son at bedside.

## 2022-12-24 NOTE — PROGRESS NOTE ADULT - SUBJECTIVE AND OBJECTIVE BOX
Patient is a 72y old  Male who presents with a chief complaint of s/p cardiac arrest, hypoglycemia (24 Dec 2022 08:43)      INTERVAL HPI/OVERNIGHT EVENTS:  Pt was seen and examined, no acute events.      MEDICATIONS  (STANDING):  aspirin  chewable 81 milliGRAM(s) Oral daily  atorvastatin 20 milliGRAM(s) Oral at bedtime  bacitracin   Ointment 1 Application(s) Topical two times a day  budesonide  80 MICROgram(s)/formoterol 4.5 MICROgram(s) Inhaler 2 Puff(s) Inhalation two times a day  chlorhexidine 0.12% Liquid 15 milliLiter(s) Oral Mucosa every 12 hours  chlorhexidine 2% Cloths 1 Application(s) Topical <User Schedule>  collagenase Ointment 1 Application(s) Topical two times a day  cyanocobalamin 1000 MICROGram(s) Oral daily  enoxaparin Injectable 80 milliGRAM(s) SubCutaneous every 12 hours  folic acid 1 milliGRAM(s) Oral daily  insulin glargine Injectable (LANTUS) 20 Unit(s) SubCutaneous every morning  insulin lispro (ADMELOG) corrective regimen sliding scale   SubCutaneous every 6 hours  levETIRAcetam 1500 milliGRAM(s) Oral two times a day  metoprolol tartrate 25 milliGRAM(s) Oral two times a day  polyethylene glycol 3350 17 Gram(s) Oral daily  senna 2 Tablet(s) Oral at bedtime  silver sulfADIAZINE 1% Cream 1 Application(s) Topical two times a day  valproic  acid Syrup 500 milliGRAM(s) Enteral Tube every 8 hours    MEDICATIONS  (PRN):  acetaminophen     Tablet .. 650 milliGRAM(s) Oral every 6 hours PRN Temp greater or equal to 38C (100.4F), Mild Pain (1 - 3)  albuterol    90 MICROgram(s) HFA Inhaler 2 Puff(s) Inhalation every 6 hours PRN Shortness of Breath and/or Wheezing  aluminum hydroxide/magnesium hydroxide/simethicone Suspension 30 milliLiter(s) Oral every 4 hours PRN Dyspepsia      Allergies    No Known Allergies    Intolerances          Vital Signs Last 24 Hrs  T(C): 36.7 (24 Dec 2022 16:02), Max: 37.3 (24 Dec 2022 04:59)  T(F): 98 (24 Dec 2022 16:02), Max: 99.1 (24 Dec 2022 04:59)  HR: 78 (24 Dec 2022 21:35) (51 - 86)  BP: 120/68 (24 Dec 2022 16:02) (108/60 - 121/61)  BP(mean): --  RR: 18 (24 Dec 2022 16:02) (17 - 18)  SpO2: 100% (24 Dec 2022 21:35) (98% - 100%)    Parameters below as of 24 Dec 2022 17:01  Patient On (Oxygen Delivery Method): ventilator,30%        PHYSICAL EXAM:  GENERAL: NAD  HEAD:  Atraumatic  EYES: PERRLA  ENMT: Mouth moist  NECK: Supple  NERVOUS SYSTEM:  Awake, alert, following command  CHEST/LUNG: Diminished, on vent  HEART: RRR, S1, S2  ABDOMEN: Soft,  non tender, PEG in place  EXTREMITIES:  No edema B/L LE  SKIN: No rash        LABS:                        10.2   8.99  )-----------( 335      ( 24 Dec 2022 08:55 )             33.0               CAPILLARY BLOOD GLUCOSE      POCT Blood Glucose.: 162 mg/dL (24 Dec 2022 21:00)  POCT Blood Glucose.: 138 mg/dL (24 Dec 2022 16:33)  POCT Blood Glucose.: 140 mg/dL (24 Dec 2022 11:25)  POCT Blood Glucose.: 160 mg/dL (24 Dec 2022 05:56)  POCT Blood Glucose.: 162 mg/dL (23 Dec 2022 23:53)      RADIOLOGY & ADDITIONAL TESTS:    Imaging Personally Reviewed:  [ ] YES  [ ] NO    Consultant(s) Notes Reviewed:  [ ] YES  [ ] NO    Care Discussed with Consultants/Other Providers [ ] YES  [ ] NO

## 2022-12-25 LAB
ALBUMIN SERPL ELPH-MCNC: 2.1 G/DL — LOW (ref 3.3–5)
ALP SERPL-CCNC: 92 U/L — SIGNIFICANT CHANGE UP (ref 40–120)
ALT FLD-CCNC: 15 U/L — SIGNIFICANT CHANGE UP (ref 12–78)
ANION GAP SERPL CALC-SCNC: 4 MMOL/L — LOW (ref 5–17)
AST SERPL-CCNC: 12 U/L — LOW (ref 15–37)
BILIRUB SERPL-MCNC: 0.2 MG/DL — SIGNIFICANT CHANGE UP (ref 0.2–1.2)
BUN SERPL-MCNC: 26 MG/DL — HIGH (ref 7–23)
CALCIUM SERPL-MCNC: 8.6 MG/DL — SIGNIFICANT CHANGE UP (ref 8.5–10.1)
CHLORIDE SERPL-SCNC: 100 MMOL/L — SIGNIFICANT CHANGE UP (ref 96–108)
CO2 SERPL-SCNC: 35 MMOL/L — HIGH (ref 22–31)
CREAT SERPL-MCNC: 0.84 MG/DL — SIGNIFICANT CHANGE UP (ref 0.5–1.3)
EGFR: 92 ML/MIN/1.73M2 — SIGNIFICANT CHANGE UP
GLUCOSE BLDC GLUCOMTR-MCNC: 128 MG/DL — HIGH (ref 70–99)
GLUCOSE BLDC GLUCOMTR-MCNC: 146 MG/DL — HIGH (ref 70–99)
GLUCOSE BLDC GLUCOMTR-MCNC: 161 MG/DL — HIGH (ref 70–99)
GLUCOSE BLDC GLUCOMTR-MCNC: 167 MG/DL — HIGH (ref 70–99)
GLUCOSE BLDC GLUCOMTR-MCNC: 170 MG/DL — HIGH (ref 70–99)
GLUCOSE BLDC GLUCOMTR-MCNC: 172 MG/DL — HIGH (ref 70–99)
GLUCOSE SERPL-MCNC: 159 MG/DL — HIGH (ref 70–99)
HCT VFR BLD CALC: 31.9 % — LOW (ref 39–50)
HGB BLD-MCNC: 9.9 G/DL — LOW (ref 13–17)
INR BLD: 1.02 RATIO — SIGNIFICANT CHANGE UP (ref 0.88–1.16)
MCHC RBC-ENTMCNC: 30 PG — SIGNIFICANT CHANGE UP (ref 27–34)
MCHC RBC-ENTMCNC: 31 G/DL — LOW (ref 32–36)
MCV RBC AUTO: 96.7 FL — SIGNIFICANT CHANGE UP (ref 80–100)
NRBC # BLD: 0 /100 WBCS — SIGNIFICANT CHANGE UP (ref 0–0)
PHOSPHATE SERPL-MCNC: 3 MG/DL — SIGNIFICANT CHANGE UP (ref 2.5–4.5)
PLATELET # BLD AUTO: 377 K/UL — SIGNIFICANT CHANGE UP (ref 150–400)
POTASSIUM SERPL-MCNC: 3.9 MMOL/L — SIGNIFICANT CHANGE UP (ref 3.5–5.3)
POTASSIUM SERPL-SCNC: 3.9 MMOL/L — SIGNIFICANT CHANGE UP (ref 3.5–5.3)
PROT SERPL-MCNC: 6.1 GM/DL — SIGNIFICANT CHANGE UP (ref 6–8.3)
PROTHROM AB SERPL-ACNC: 12.3 SEC — SIGNIFICANT CHANGE UP (ref 10.5–13.4)
RBC # BLD: 3.3 M/UL — LOW (ref 4.2–5.8)
RBC # FLD: 14.6 % — HIGH (ref 10.3–14.5)
SODIUM SERPL-SCNC: 139 MMOL/L — SIGNIFICANT CHANGE UP (ref 135–145)
WBC # BLD: 7.23 K/UL — SIGNIFICANT CHANGE UP (ref 3.8–10.5)
WBC # FLD AUTO: 7.23 K/UL — SIGNIFICANT CHANGE UP (ref 3.8–10.5)

## 2022-12-25 PROCEDURE — 99232 SBSQ HOSP IP/OBS MODERATE 35: CPT

## 2022-12-25 RX ADMIN — Medication 1 MILLIGRAM(S): at 11:26

## 2022-12-25 RX ADMIN — Medication 1 APPLICATION(S): at 05:39

## 2022-12-25 RX ADMIN — Medication 1 APPLICATION(S): at 17:10

## 2022-12-25 RX ADMIN — Medication 25 MILLIGRAM(S): at 05:38

## 2022-12-25 RX ADMIN — Medication 500 MILLIGRAM(S): at 21:29

## 2022-12-25 RX ADMIN — Medication 650 MILLIGRAM(S): at 00:09

## 2022-12-25 RX ADMIN — LEVETIRACETAM 1500 MILLIGRAM(S): 250 TABLET, FILM COATED ORAL at 05:38

## 2022-12-25 RX ADMIN — Medication 1 APPLICATION(S): at 17:11

## 2022-12-25 RX ADMIN — PREGABALIN 1000 MICROGRAM(S): 225 CAPSULE ORAL at 11:26

## 2022-12-25 RX ADMIN — Medication 1 APPLICATION(S): at 05:38

## 2022-12-25 RX ADMIN — CHLORHEXIDINE GLUCONATE 15 MILLILITER(S): 213 SOLUTION TOPICAL at 05:38

## 2022-12-25 RX ADMIN — Medication 25 MILLIGRAM(S): at 17:11

## 2022-12-25 RX ADMIN — LEVETIRACETAM 1500 MILLIGRAM(S): 250 TABLET, FILM COATED ORAL at 17:12

## 2022-12-25 RX ADMIN — INSULIN GLARGINE 20 UNIT(S): 100 INJECTION, SOLUTION SUBCUTANEOUS at 08:07

## 2022-12-25 RX ADMIN — CHLORHEXIDINE GLUCONATE 1 APPLICATION(S): 213 SOLUTION TOPICAL at 05:37

## 2022-12-25 RX ADMIN — ENOXAPARIN SODIUM 80 MILLIGRAM(S): 100 INJECTION SUBCUTANEOUS at 17:11

## 2022-12-25 RX ADMIN — ATORVASTATIN CALCIUM 20 MILLIGRAM(S): 80 TABLET, FILM COATED ORAL at 21:29

## 2022-12-25 RX ADMIN — BUDESONIDE AND FORMOTEROL FUMARATE DIHYDRATE 2 PUFF(S): 160; 4.5 AEROSOL RESPIRATORY (INHALATION) at 05:18

## 2022-12-25 RX ADMIN — Medication 81 MILLIGRAM(S): at 11:26

## 2022-12-25 RX ADMIN — ENOXAPARIN SODIUM 80 MILLIGRAM(S): 100 INJECTION SUBCUTANEOUS at 05:38

## 2022-12-25 RX ADMIN — Medication 2: at 11:01

## 2022-12-25 RX ADMIN — BUDESONIDE AND FORMOTEROL FUMARATE DIHYDRATE 2 PUFF(S): 160; 4.5 AEROSOL RESPIRATORY (INHALATION) at 17:24

## 2022-12-25 RX ADMIN — Medication 500 MILLIGRAM(S): at 14:01

## 2022-12-25 RX ADMIN — Medication 2: at 23:41

## 2022-12-25 RX ADMIN — Medication 500 MILLIGRAM(S): at 05:37

## 2022-12-25 NOTE — PROGRESS NOTE ADULT - SUBJECTIVE AND OBJECTIVE BOX
Patient is a 72y old  Male who presents with a chief complaint of s/p cardiac arrest, hypoglycemia (25 Dec 2022 10:01)      INTERVAL HPI/OVERNIGHT EVENTS:  Pt was seen and examined, no acute events.      MEDICATIONS  (STANDING):  aspirin  chewable 81 milliGRAM(s) Oral daily  atorvastatin 20 milliGRAM(s) Oral at bedtime  bacitracin   Ointment 1 Application(s) Topical two times a day  budesonide  80 MICROgram(s)/formoterol 4.5 MICROgram(s) Inhaler 2 Puff(s) Inhalation two times a day  chlorhexidine 0.12% Liquid 15 milliLiter(s) Oral Mucosa every 12 hours  chlorhexidine 2% Cloths 1 Application(s) Topical <User Schedule>  collagenase Ointment 1 Application(s) Topical two times a day  cyanocobalamin 1000 MICROGram(s) Oral daily  enoxaparin Injectable 80 milliGRAM(s) SubCutaneous every 12 hours  folic acid 1 milliGRAM(s) Oral daily  insulin glargine Injectable (LANTUS) 20 Unit(s) SubCutaneous every morning  insulin lispro (ADMELOG) corrective regimen sliding scale   SubCutaneous every 6 hours  levETIRAcetam 1500 milliGRAM(s) Oral two times a day  metoprolol tartrate 25 milliGRAM(s) Oral two times a day  polyethylene glycol 3350 17 Gram(s) Oral daily  senna 2 Tablet(s) Oral at bedtime  silver sulfADIAZINE 1% Cream 1 Application(s) Topical two times a day  valproic  acid Syrup 500 milliGRAM(s) Enteral Tube every 8 hours    MEDICATIONS  (PRN):  acetaminophen     Tablet .. 650 milliGRAM(s) Oral every 6 hours PRN Temp greater or equal to 38C (100.4F), Mild Pain (1 - 3)  albuterol    90 MICROgram(s) HFA Inhaler 2 Puff(s) Inhalation every 6 hours PRN Shortness of Breath and/or Wheezing  aluminum hydroxide/magnesium hydroxide/simethicone Suspension 30 milliLiter(s) Oral every 4 hours PRN Dyspepsia      Allergies  No Known Allergies      Vital Signs Last 24 Hrs  T(C): 36.6 (25 Dec 2022 10:45), Max: 36.7 (24 Dec 2022 16:02)  T(F): 97.9 (25 Dec 2022 10:45), Max: 98 (24 Dec 2022 16:02)  HR: 72 (25 Dec 2022 11:53) (57 - 86)  BP: 112/69 (25 Dec 2022 10:45) (112/69 - 134/79)  BP(mean): --  RR: 18 (25 Dec 2022 10:45) (18 - 18)  SpO2: 99% (25 Dec 2022 11:53) (99% - 100%)    Parameters below as of 25 Dec 2022 10:45  Patient On (Oxygen Delivery Method): ventilator        PHYSICAL EXAM:  GENERAL: NAD  HEAD:  Atraumatic  EYES: PERRLA  ENMT: Mouth moist  NECK: Supple  NERVOUS SYSTEM:  Awake, alert, following command  CHEST/LUNG: Diminished, on vent  HEART: RRR, S1, S2  ABDOMEN: Soft,  non tender, PEG in place  EXTREMITIES:  No edema B/L LE  SKIN: No rash        LABS:                        9.9    7.23  )-----------( 377      ( 25 Dec 2022 06:30 )             31.9     12-25    139  |  100  |  26<H>  ----------------------------<  159<H>  3.9   |  35<H>  |  0.84    Ca    8.6      25 Dec 2022 06:30  Phos  3.0     12-25    TPro  6.1  /  Alb  2.1<L>  /  TBili  0.2  /  DBili  x   /  AST  12<L>  /  ALT  15  /  AlkPhos  92  12-25    PT/INR - ( 25 Dec 2022 06:30 )   PT: 12.3 sec;   INR: 1.02 ratio             CAPILLARY BLOOD GLUCOSE      POCT Blood Glucose.: 167 mg/dL (25 Dec 2022 11:00)  POCT Blood Glucose.: 170 mg/dL (25 Dec 2022 07:50)  POCT Blood Glucose.: 172 mg/dL (25 Dec 2022 00:08)  POCT Blood Glucose.: 162 mg/dL (24 Dec 2022 21:00)  POCT Blood Glucose.: 138 mg/dL (24 Dec 2022 16:33)      RADIOLOGY & ADDITIONAL TESTS:    Imaging Personally Reviewed:  [ ] YES  [ ] NO    Consultant(s) Notes Reviewed:  [ ] YES  [ ] NO    Care Discussed with Consultants/Other Providers [ ] YES  [ ] NO

## 2022-12-25 NOTE — PROGRESS NOTE ADULT - ASSESSMENT
REVIEW OF SYMPTOMS      Able to give (reliable) ROS  NO     PHYSICAL EXAM    HEENT Unremarkable  atraumatic   RESP Fair air entry EXP prolonged    Harsh breath sound Resp distres mild   CARDIAC S1 S2 No S3     NO JVD    ABDOMEN SOFT BS PRESENT NOT DISTENDED No hepatosplenomegaly   PEDAL EDEMA present No calf tenderness  NO rash     GENERAL DATA .   GOC.  12/11/2022 full code       ALLGY.  nka                            WT. ..  12/2/2022 86  BMI. ..    12/2/2022 29                          ICU STAY.  .. 11/29-12/9  COVID.   .. 12/2/2022 scv2 (-)   .. 11/20/2022 scv2 (-)   BEST PRACTICE ISSUES.    HOB ELEVATN. Yes  DVT PPLX.   12/15/2022 lvnx 80.2 ( l sc thrombus)    ALEJO PPLX.   ..      INFN PPLX.   .. 11/25 chlorhex .12%   .. 11/14 chlorhex 2%   SP SW ANTWAN.         DIET.    ..  12/15 glucerna 1.2 1440 gt   IV fl.  ..            PROCEDURES.  .. 12/19 debridement of sacral wound   .. 12/5/2022 trach  .. 12/5/2022 peg   .. 12/12 r arm midline     ABGS.  12/3/2022 ac 14/450/.3/5 750/46/75      VS/ PO/IO/ VENT/ DRIPS.  12/25/2022 afeb 70 100/90     MAIN ISSUES.  72 m doa 11/14/2022 cac  PMH CAD s/p PCI with stent 2015 and CABG 2014,   PMH  s/p medtronic PPM,   PMH CVA (lacunar infarct)  1) PEA arrest with ROSC after approximately 5 min 11/14  Now communicative   2) DVT 12/12 l Subclav thromS 12/15/2022 lvnx 80.2  3) TRACH 12/5/2022 trach  4) PEG12/5/2022 peg   5) Cellulitis hand absc 12/13 mod enterococcus fecalis  staph epi 12/12-12/15/2022  cephalexin 12/15 vanco once  12/16-12/19 zosyn  6) Vent weaning     PROBLEM ASSESSMENT RECOMMENDATIONS..  Sp  cac 11/14/2022   .. Patient is interactive and answers questions appropriately as dw son on 12/13/2022   VTE.  .. 12/12/2022 Pt found to have l subclav dvt    12/12/2022 iv ufh startd   .. 12/15/2022 changed to lvnx   Trach 12/5   .. trach care   COPD.  .. 12/22/2022 symbicort 80   vent management.  .. vent bundle dsbt dsv target pplat 30 (-) PO2 60 (+) pH 730 (+)   weaming.  .. 12/13-12/14-12/16/2022 tolerated cpap 12/13-12/14-12/16/20 rsbi 78-94-71  .. 12/15/2022 tolerated cpap 4 h  .. 12/18/2022 dw rt Pt not tolerating cpap gets agitated after few min on 12/17    .. 12/22/2022 dw resp and with pt son bedside Pt has not been tolerating cpap   .. 12/24/2022 dw rt Try cpap 5 ps5 and if toerates will place on tc 12/25 12/25/2022 failed cpap 5/5/ on 12/24   Infection.  ..  Monitor for vap    cellulitis hand   .. w 12/24/2022 w 8.9   .. absc 12/13 mod enterococcus fecalis  staph epi   .. sp course of abio   CAD.  .. 11/14 asa 81   .. 12/13 metoprolol 25.2   .. Monitor   CHF.   .. 11/14/2022 echo mod decr segmental lvsf apical lateral apiccal ant segment are abn takotsubo cmpthy pasp 42 .  .. monitor for chf   Dysphagia.  .. 12/24/2022 speech eval requestd   anemia.  .. Hb 12/16-12/17-12/18-12/19-12/20-12/23-12/25/2022   Hb 8.9 - 9.5- 8.9  - 9.2 - 9.1 - 8.8 - 9.9   .. Monitor  Target Hb 7 (+)   DECUB.  .. 12/2 collagenase   SEIZURES.  .. sz meds as guided by neuro     TIME SPENT   Over 25 minutes aggregate care time spent on encounter; activities included   direct patient care, counseling and/or coordinating care reviewing notes, lab data/ imaging , discussion with multidisciplinary team/ patient  /family and explaining in detail risks, benefits, alternatives  of the recommendations

## 2022-12-25 NOTE — PROGRESS NOTE ADULT - SUBJECTIVE AND OBJECTIVE BOX
BRANDON CAMARILLO    [facility] 2[facility] [address]    Allergies    No Known Allergies    Intolerances        PAST MEDICAL & SURGICAL HISTORY:  HTN (hypertension)      HLD (hyperlipidemia)      Diabetes mellitus      Lacunar infarction      BPH (benign prostatic hyperplasia)      CHF (congestive heart failure)      Chronic obstructive pulmonary disease, unspecified COPD type      S/P CABG (coronary artery bypass graft)  2014          FAMILY HISTORY:      Home Medications:  aspirin 81 mg oral delayed release tablet: 1 tab(s) orally once a day (12 Jun 2020 17:35)  Liquifilm Tears preserved ophthalmic solution: 1 drop(s) to each affected eye 2 times a day (12 Jun 2020 17:35)      MEDICATIONS  (STANDING):  aspirin  chewable 81 milliGRAM(s) Oral daily  atorvastatin 20 milliGRAM(s) Oral at bedtime  bacitracin   Ointment 1 Application(s) Topical two times a day  budesonide  80 MICROgram(s)/formoterol 4.5 MICROgram(s) Inhaler 2 Puff(s) Inhalation two times a day  chlorhexidine 0.12% Liquid 15 milliLiter(s) Oral Mucosa every 12 hours  chlorhexidine 2% Cloths 1 Application(s) Topical <User Schedule>  collagenase Ointment 1 Application(s) Topical two times a day  cyanocobalamin 1000 MICROGram(s) Oral daily  enoxaparin Injectable 80 milliGRAM(s) SubCutaneous every 12 hours  folic acid 1 milliGRAM(s) Oral daily  insulin glargine Injectable (LANTUS) 20 Unit(s) SubCutaneous every morning  insulin lispro (ADMELOG) corrective regimen sliding scale   SubCutaneous every 6 hours  levETIRAcetam 1500 milliGRAM(s) Oral two times a day  metoprolol tartrate 25 milliGRAM(s) Oral two times a day  polyethylene glycol 3350 17 Gram(s) Oral daily  senna 2 Tablet(s) Oral at bedtime  silver sulfADIAZINE 1% Cream 1 Application(s) Topical two times a day  valproic  acid Syrup 500 milliGRAM(s) Enteral Tube every 8 hours    MEDICATIONS  (PRN):  acetaminophen     Tablet .. 650 milliGRAM(s) Oral every 6 hours PRN Temp greater or equal to 38C (100.4F), Mild Pain (1 - 3)  albuterol    90 MICROgram(s) HFA Inhaler 2 Puff(s) Inhalation every 6 hours PRN Shortness of Breath and/or Wheezing  aluminum hydroxide/magnesium hydroxide/simethicone Suspension 30 milliLiter(s) Oral every 4 hours PRN Dyspepsia      Diet, NPO with Tube Feed:   Tube Feeding Modality: Gastrostomy  Glucerna 1.2 Pedrito  Total Volume for 24 Hours (mL): 1440  Continuous  Until Goal Tube Feed Rate (mL per Hour): 60  Tube Feed Duration (in Hours): 24  Tube Feed Start Time: 12:15  Free Water Flush Instructions:  automatic water flus via pump @ 20 ml/hr (12-23-22 @ 12:08) [Active]          Vital Signs Last 24 Hrs  T(C): 36.6 (25 Dec 2022 04:41), Max: 36.7 (24 Dec 2022 16:02)  T(F): 97.9 (25 Dec 2022 04:41), Max: 98 (24 Dec 2022 16:02)  HR: 74 (25 Dec 2022 06:36) (51 - 86)  BP: 134/79 (25 Dec 2022 04:41) (112/60 - 134/79)  BP(mean): --  RR: 18 (25 Dec 2022 04:41) (18 - 18)  SpO2: 100% (25 Dec 2022 06:36) (98% - 100%)    Parameters below as of 25 Dec 2022 06:36  Patient On (Oxygen Delivery Method): ventilator            Mode: AC/ CMV (Assist Control/ Continuous Mandatory Ventilation), RR (machine): 14, TV (machine): 450, FiO2: 30, PEEP: 5, ITime: 1, MAP: 11, PIP: 26      LABS:                        9.9    7.23  )-----------( 377      ( 25 Dec 2022 06:30 )             31.9     12-25    139  |  100  |  26<H>  ----------------------------<  159<H>  3.9   |  35<H>  |  0.84    Ca    8.6      25 Dec 2022 06:30  Phos  3.0     12-25    TPro  6.1  /  Alb  2.1<L>  /  TBili  0.2  /  DBili  x   /  AST  12<L>  /  ALT  15  /  AlkPhos  92  12-25    PT/INR - ( 25 Dec 2022 06:30 )   PT: 12.3 sec;   INR: 1.02 ratio                   WBC:  WBC Count: 7.23 K/uL (12-25 @ 06:30)  WBC Count: 8.99 K/uL (12-24 @ 08:55)  WBC Count: 7.33 K/uL (12-23 @ 06:54)  WBC Count: 7.55 K/uL (12-22 @ 07:15)      MICROBIOLOGY:  RECENT CULTURES:  12-19 .Surgical Swab Surgical Swab Enterococcus faecalis XXXX   Rare Enterococcus faecalis                PT/INR - ( 25 Dec 2022 06:30 )   PT: 12.3 sec;   INR: 1.02 ratio             Sodium:  Sodium, Serum: 139 mmol/L (12-25 @ 06:30)  Sodium, Serum: 140 mmol/L (12-22 @ 07:15)      0.84 mg/dL 12-25 @ 06:30  0.86 mg/dL 12-22 @ 07:15      Hemoglobin:  Hemoglobin: 9.9 g/dL (12-25 @ 06:30)  Hemoglobin: 10.2 g/dL (12-24 @ 08:55)  Hemoglobin: 8.8 g/dL (12-23 @ 06:54)  Hemoglobin: 8.9 g/dL (12-22 @ 07:15)      Platelets: Platelet Count - Automated: 377 K/uL (12-25 @ 06:30)  Platelet Count - Automated: 335 K/uL (12-24 @ 08:55)  Platelet Count - Automated: 343 K/uL (12-23 @ 06:54)  Platelet Count - Automated: 310 K/uL (12-22 @ 07:15)      LIVER FUNCTIONS - ( 25 Dec 2022 06:30 )  Alb: 2.1 g/dL / Pro: 6.1 gm/dL / ALK PHOS: 92 U/L / ALT: 15 U/L / AST: 12 U/L / GGT: x                 RADIOLOGY & ADDITIONAL STUDIES:      MICROBIOLOGY:  RECENT CULTURES:  12-19 .Surgical Swab Surgical Swab Enterococcus faecalis XXXX   Rare Enterococcus faecalis

## 2022-12-25 NOTE — PROGRESS NOTE ADULT - ASSESSMENT
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 Unknown how long pt unresponsive for at home. Blood sugar in the 40s with EMS s/p glucagon. On arrival to ER pt hypothermic and agonally breathing. Pt became unresponsive with loss of pulse; ACLS initiated. PEA arrest with ROSC after approximately 5 min s/p Epi x2.  Pt intubated during CODE BLUE. Per son pt on the day prior to presentation reported low blood sugar reads on his glucometer in the range of 80s. Son believes pt continued to take his insulin and oral diabetic regimen. Son states that pt was eating but may have been eating less in the last few days. Later on pt noted to have tonic-clonic Sz.     PEA arrest  s/p Epi x 2. ROSC achieved  CAD/ CHF history:  Takotsubo cardiomyopathy found on Echo   stable  On Coreg, Entresto/ Lasix/ ASA, plavix at home , now on asa, metoprolol   12/13--cardio eval appreciated, outpt follow up    Acute hypoxemic respiratory failure now chronic  s/p intubation , failed extubation, now trach to vent  c/w vent managment  pulm consulted  Continue weaning trial, possibly can switch to trach collar next wk  12/12 trach sutures removed by surgery   Albuterol and Symbicort added for H/O COPD, asthma      LUE VTE with cellulitis /infected left hand hematoma s/p bedside debridement 12/13   LUE edema and wound of left hand ; good radial pulse   --LUE venous duplex : occlusive thrombus in the left mid subclavian vein with partial   slow flow detected in the lateral subclavian vein.  --LUE arterial Duplex neg   --on lovenox   --vascular following   --12/13 Hand surgery consult appreciated, s/p bedside debridement.   --ID consult appreciated  --antibiotic course completed     Sacral Ulcer  -s/p debridement on 12/19    New onset Seizure likely due to anoxic brain injury  discussed with Dr. Loo, patient will be only on Keppra 1500mg bid   off vimpat and Depakote   monitor     ELMER 2 to ATN; resolved    Shock liver.  due to PEA arrest. resolved    DM2   A1c 8.8%  continue with current management   FS goal 140-180   continue with PEG feeds     Hypophosphatemia --repleted     unstageable sacral pressure wound   s/p debridement     Normocytic anemia   no e/o bleeding or bruising   H/H stable   low normal Vit B12 and folic acid --supplement       Preventative measures   heparin gtt --dvt ppx  fall, aspiration  precautions , remove mittens.   HOBE     Dispo: patient is undocumented, will need PRUCOL    Discussed with son at bedside.

## 2022-12-26 LAB
GLUCOSE BLDC GLUCOMTR-MCNC: 115 MG/DL — HIGH (ref 70–99)
GLUCOSE BLDC GLUCOMTR-MCNC: 115 MG/DL — HIGH (ref 70–99)
GLUCOSE BLDC GLUCOMTR-MCNC: 129 MG/DL — HIGH (ref 70–99)
GLUCOSE BLDC GLUCOMTR-MCNC: 143 MG/DL — HIGH (ref 70–99)
GLUCOSE BLDC GLUCOMTR-MCNC: 164 MG/DL — HIGH (ref 70–99)
GLUCOSE BLDC GLUCOMTR-MCNC: 166 MG/DL — HIGH (ref 70–99)
GLUCOSE BLDC GLUCOMTR-MCNC: 172 MG/DL — HIGH (ref 70–99)
HCT VFR BLD CALC: 30.7 % — LOW (ref 39–50)
HGB BLD-MCNC: 9.6 G/DL — LOW (ref 13–17)
MCHC RBC-ENTMCNC: 29.8 PG — SIGNIFICANT CHANGE UP (ref 27–34)
MCHC RBC-ENTMCNC: 31.3 G/DL — LOW (ref 32–36)
MCV RBC AUTO: 95.3 FL — SIGNIFICANT CHANGE UP (ref 80–100)
NRBC # BLD: 0 /100 WBCS — SIGNIFICANT CHANGE UP (ref 0–0)
PLATELET # BLD AUTO: 335 K/UL — SIGNIFICANT CHANGE UP (ref 150–400)
RBC # BLD: 3.22 M/UL — LOW (ref 4.2–5.8)
RBC # FLD: 14.7 % — HIGH (ref 10.3–14.5)
WBC # BLD: 7.3 K/UL — SIGNIFICANT CHANGE UP (ref 3.8–10.5)
WBC # FLD AUTO: 7.3 K/UL — SIGNIFICANT CHANGE UP (ref 3.8–10.5)

## 2022-12-26 PROCEDURE — 99232 SBSQ HOSP IP/OBS MODERATE 35: CPT

## 2022-12-26 PROCEDURE — 71045 X-RAY EXAM CHEST 1 VIEW: CPT | Mod: 26

## 2022-12-26 RX ADMIN — Medication 1 APPLICATION(S): at 17:13

## 2022-12-26 RX ADMIN — CHLORHEXIDINE GLUCONATE 1 APPLICATION(S): 213 SOLUTION TOPICAL at 05:31

## 2022-12-26 RX ADMIN — ENOXAPARIN SODIUM 80 MILLIGRAM(S): 100 INJECTION SUBCUTANEOUS at 05:31

## 2022-12-26 RX ADMIN — Medication 500 MILLIGRAM(S): at 12:54

## 2022-12-26 RX ADMIN — Medication 500 MILLIGRAM(S): at 05:30

## 2022-12-26 RX ADMIN — CHLORHEXIDINE GLUCONATE 15 MILLILITER(S): 213 SOLUTION TOPICAL at 05:31

## 2022-12-26 RX ADMIN — PREGABALIN 1000 MICROGRAM(S): 225 CAPSULE ORAL at 12:54

## 2022-12-26 RX ADMIN — LEVETIRACETAM 1500 MILLIGRAM(S): 250 TABLET, FILM COATED ORAL at 05:32

## 2022-12-26 RX ADMIN — BUDESONIDE AND FORMOTEROL FUMARATE DIHYDRATE 2 PUFF(S): 160; 4.5 AEROSOL RESPIRATORY (INHALATION) at 05:34

## 2022-12-26 RX ADMIN — Medication 25 MILLIGRAM(S): at 05:31

## 2022-12-26 RX ADMIN — Medication 1 MILLIGRAM(S): at 12:54

## 2022-12-26 RX ADMIN — Medication 1 APPLICATION(S): at 05:33

## 2022-12-26 RX ADMIN — ENOXAPARIN SODIUM 80 MILLIGRAM(S): 100 INJECTION SUBCUTANEOUS at 17:15

## 2022-12-26 RX ADMIN — Medication 2: at 12:55

## 2022-12-26 RX ADMIN — ATORVASTATIN CALCIUM 20 MILLIGRAM(S): 80 TABLET, FILM COATED ORAL at 21:21

## 2022-12-26 RX ADMIN — Medication 500 MILLIGRAM(S): at 21:21

## 2022-12-26 RX ADMIN — Medication 81 MILLIGRAM(S): at 17:15

## 2022-12-26 RX ADMIN — Medication 1 APPLICATION(S): at 17:12

## 2022-12-26 RX ADMIN — Medication 1 APPLICATION(S): at 05:31

## 2022-12-26 RX ADMIN — BUDESONIDE AND FORMOTEROL FUMARATE DIHYDRATE 2 PUFF(S): 160; 4.5 AEROSOL RESPIRATORY (INHALATION) at 17:13

## 2022-12-26 RX ADMIN — CHLORHEXIDINE GLUCONATE 15 MILLILITER(S): 213 SOLUTION TOPICAL at 17:16

## 2022-12-26 RX ADMIN — LEVETIRACETAM 1500 MILLIGRAM(S): 250 TABLET, FILM COATED ORAL at 17:15

## 2022-12-26 RX ADMIN — Medication 25 MILLIGRAM(S): at 17:15

## 2022-12-26 RX ADMIN — INSULIN GLARGINE 20 UNIT(S): 100 INJECTION, SOLUTION SUBCUTANEOUS at 12:53

## 2022-12-26 NOTE — PROGRESS NOTE ADULT - ASSESSMENT
REVIEW OF SYMPTOMS      Able to give (reliable) ROS  NO     PHYSICAL EXAM    HEENT Unremarkable  atraumatic   RESP Fair air entry EXP prolonged    Harsh breath sound Resp distres mild   CARDIAC S1 S2 No S3     NO JVD    ABDOMEN SOFT BS PRESENT NOT DISTENDED No hepatosplenomegaly   PEDAL EDEMA present No calf tenderness  NO rash       GENERAL DATA .   GOC.  12/11/2022 full code       ALLGY.  nka                            WT. ..  12/2/2022 86  BMI. ..    12/2/2022 29                          ICU STAY.  .. 11/29-12/9  COVID.   .. 12/2/2022 scv2 (-)   .. 11/20/2022 scv2 (-)   BEST PRACTICE ISSUES.    HOB ELEVATN. Yes  DVT PPLX.   12/15/2022 lvnx 80.2 ( l sc thrombus)    ALEJO PPLX.   ..      INFN PPLX.   .. 11/25 chlorhex .12%   .. 11/14 chlorhex 2%   SP SW ANTWAN.         DIET.    ..  12/15 glucerna 1.2 1440 gt   IV fl.  ..            PROCEDURES.  .. 12/19 debridement of sacral wound   .. 12/5/2022 trach  .. 12/5/2022 peg   .. 12/12 r arm midline       ABGS.  12/3/2022 ac 14/450/.3/5 750/46/75      VS/ PO/IO/ VENT/ DRIPS.  12/26/2022 afeb 69 100/50   12/26/2022 ac 14/450/5/.3    MAIN ISSUES.  72 m doa 11/14/2022 cac  PMH CAD s/p PCI with stent 2015 and CABG 2014,   PMH  s/p medtronic PPM,   PMH CVA (lacunar infarct)  1) PEA arrest with ROSC after approximately 5 min 11/14  Now communicative   2) DVT 12/12 l Subclav thromS 12/15/2022 lvnx 80.2  3) TRACH 12/5/2022 trach  4) PEG12/5/2022 peg   5) Cellulitis hand absc 12/13 mod enterococcus fecalis  staph epi 12/12-12/15/2022  cephalexin 12/15 vanco once  12/16-12/19 zosyn  6) Vent weaning       PROBLEM ASSESSMENT RECOMMENDATIONS..  Sp  cac 11/14/2022   .. Patient is interactive and answers questions appropriately as dw son on 12/13/2022   VTE.  .. 12/12/2022 Pt found to have l subclav dvt    12/12/2022 iv ufh startd   .. 12/15/2022 changed to lvnx   Trach 12/5   .. trach care   COPD.  .. 12/22/2022 symbicort 80   vent management.  .. vent bundle dsbt dsv target pplat 30 (-) PO2 60 (+) pH 730 (+)   weaming.  .. 12/13-12/14-12/16/2022 tolerated cpap 12/13-12/14-12/16/20 rsbi 78-94-71  .. 12/15/2022 tolerated cpap 4 h  .. 12/18/2022 dw rt Pt not tolerating cpap gets agitated after few min on 12/17    .. 12/22/2022 dw resp and with pt son bedside Pt has not been tolerating cpap   .. 12/24/2022 dw rt Try cpap 5 ps5 and if toerates will place on tc 12/25 12/25/2022 failed cpap 5/5/ on 12/24   Infection.  ..  Monitor for vap    cellulitis hand   .. w 12/24/2022 w 8.9   .. absc 12/13 mod enterococcus fecalis  staph epi   .. sp course of abio   CAD.  .. 11/14 asa 81   .. 12/13 metoprolol 25.2   .. Monitor   CHF.   .. 11/14/2022 echo mod decr segmental lvsf apical lateral apiccal ant segment are abn takotsubo cmpthy pasp 42 .  .. monitor for chf   Dysphagia.  .. 12/24/2022 speech eval requestd   anemia.  .. Hb 12/16-12/17-12/18-12/19-12/20-12/23-12/25/2022   Hb 8.9 - 9.5- 8.9  - 9.2 - 9.1 - 8.8 - 9.9   .. Monitor  Target Hb 7 (+)   DECUB.  .. 12/2 collagenase   SEIZURES.  .. sz meds as guided by neuro     TIME SPENT   Over 25 minutes aggregate care time spent on encounter; activities included   direct patient care, counseling and/or coordinating care reviewing notes, lab data/ imaging , discussion with multidisciplinary team/ patient  /family and explaining in detail risks, benefits, alternatives  of the recommendations     MARQUISE TAYLOR 72 m 11/14/2022 1948 Dr Leela Dowling

## 2022-12-26 NOTE — RAPID RESPONSE TEAM SUMMARY - NSADDTLFINDINGSRRT_GEN_ALL_CORE
RRT called on 2C. Pt pulled entire tracheostomy tube out. VS stable /63, HR 66, spO2 @ 100% on NRB and temp 98.7 orally. Attempt at re-inserting tube done by ICU ZAY. New trach (size 6) placed, end tidal CO2 detector confirming artificial airway placement and patient was put back on mechanical ventilation. CXR urgent ordered.

## 2022-12-26 NOTE — PROGRESS NOTE ADULT - SUBJECTIVE AND OBJECTIVE BOX
BRANDON CAMARILLO    [facility] [address] [address]    Allergies    No Known Allergies    Intolerances        PAST MEDICAL & SURGICAL HISTORY:  HTN (hypertension)      HLD (hyperlipidemia)      Diabetes mellitus      Lacunar infarction      BPH (benign prostatic hyperplasia)      CHF (congestive heart failure)      Chronic obstructive pulmonary disease, unspecified COPD type      S/P CABG (coronary artery bypass graft)  2014          FAMILY HISTORY:      Home Medications:  aspirin 81 mg oral delayed release tablet: 1 tab(s) orally once a day (12 Jun 2020 17:35)  Liquifilm Tears preserved ophthalmic solution: 1 drop(s) to each affected eye 2 times a day (12 Jun 2020 17:35)      MEDICATIONS  (STANDING):  aspirin  chewable 81 milliGRAM(s) Oral daily  atorvastatin 20 milliGRAM(s) Oral at bedtime  bacitracin   Ointment 1 Application(s) Topical two times a day  budesonide  80 MICROgram(s)/formoterol 4.5 MICROgram(s) Inhaler 2 Puff(s) Inhalation two times a day  chlorhexidine 0.12% Liquid 15 milliLiter(s) Oral Mucosa every 12 hours  chlorhexidine 2% Cloths 1 Application(s) Topical <User Schedule>  collagenase Ointment 1 Application(s) Topical two times a day  cyanocobalamin 1000 MICROGram(s) Oral daily  enoxaparin Injectable 80 milliGRAM(s) SubCutaneous every 12 hours  folic acid 1 milliGRAM(s) Oral daily  insulin glargine Injectable (LANTUS) 20 Unit(s) SubCutaneous every morning  insulin lispro (ADMELOG) corrective regimen sliding scale   SubCutaneous every 6 hours  levETIRAcetam 1500 milliGRAM(s) Oral two times a day  metoprolol tartrate 25 milliGRAM(s) Oral two times a day  polyethylene glycol 3350 17 Gram(s) Oral daily  senna 2 Tablet(s) Oral at bedtime  silver sulfADIAZINE 1% Cream 1 Application(s) Topical two times a day  valproic  acid Syrup 500 milliGRAM(s) Enteral Tube every 8 hours    MEDICATIONS  (PRN):  acetaminophen     Tablet .. 650 milliGRAM(s) Oral every 6 hours PRN Temp greater or equal to 38C (100.4F), Mild Pain (1 - 3)  albuterol    90 MICROgram(s) HFA Inhaler 2 Puff(s) Inhalation every 6 hours PRN Shortness of Breath and/or Wheezing  aluminum hydroxide/magnesium hydroxide/simethicone Suspension 30 milliLiter(s) Oral every 4 hours PRN Dyspepsia      Diet, NPO with Tube Feed:   Tube Feeding Modality: Gastrostomy  Glucerna 1.2 Pedrito  Total Volume for 24 Hours (mL): 1440  Continuous  Until Goal Tube Feed Rate (mL per Hour): 60  Tube Feed Duration (in Hours): 24  Tube Feed Start Time: 12:15  Free Water Flush Instructions:  automatic water flus via pump @ 20 ml/hr (12-23-22 @ 12:08) [Active]          Vital Signs Last 24 Hrs  T(C): 37.3 (26 Dec 2022 05:26), Max: 37.3 (26 Dec 2022 05:26)  T(F): 99.2 (26 Dec 2022 05:26), Max: 99.2 (26 Dec 2022 05:26)  HR: 60 (26 Dec 2022 06:20) (55 - 85)  BP: 115/61 (26 Dec 2022 05:26) (94/58 - 115/61)  BP(mean): --  RR: 18 (26 Dec 2022 05:26) (18 - 19)  SpO2: 100% (26 Dec 2022 06:20) (98% - 100%)    Parameters below as of 25 Dec 2022 17:24  Patient On (Oxygen Delivery Method): ventilator            Mode: AC/ CMV (Assist Control/ Continuous Mandatory Ventilation), RR (machine): 14, TV (machine): 450, FiO2: 30, PEEP: 5, ITime: 1, MAP: 9, PIP: 21      LABS:                        9.9    7.23  )-----------( 377      ( 25 Dec 2022 06:30 )             31.9     12-25    139  |  100  |  26<H>  ----------------------------<  159<H>  3.9   |  35<H>  |  0.84    Ca    8.6      25 Dec 2022 06:30  Phos  3.0     12-25    TPro  6.1  /  Alb  2.1<L>  /  TBili  0.2  /  DBili  x   /  AST  12<L>  /  ALT  15  /  AlkPhos  92  12-25    PT/INR - ( 25 Dec 2022 06:30 )   PT: 12.3 sec;   INR: 1.02 ratio                   WBC:  WBC Count: 7.23 K/uL (12-25 @ 06:30)  WBC Count: 8.99 K/uL (12-24 @ 08:55)  WBC Count: 7.33 K/uL (12-23 @ 06:54)      MICROBIOLOGY:  RECENT CULTURES:  12-19 .Surgical Swab Surgical Swab Enterococcus faecalis XXXX   Rare Enterococcus faecalis                PT/INR - ( 25 Dec 2022 06:30 )   PT: 12.3 sec;   INR: 1.02 ratio             Sodium:  Sodium, Serum: 139 mmol/L (12-25 @ 06:30)      0.84 mg/dL 12-25 @ 06:30      Hemoglobin:  Hemoglobin: 9.9 g/dL (12-25 @ 06:30)  Hemoglobin: 10.2 g/dL (12-24 @ 08:55)  Hemoglobin: 8.8 g/dL (12-23 @ 06:54)      Platelets: Platelet Count - Automated: 377 K/uL (12-25 @ 06:30)  Platelet Count - Automated: 335 K/uL (12-24 @ 08:55)  Platelet Count - Automated: 343 K/uL (12-23 @ 06:54)      LIVER FUNCTIONS - ( 25 Dec 2022 06:30 )  Alb: 2.1 g/dL / Pro: 6.1 gm/dL / ALK PHOS: 92 U/L / ALT: 15 U/L / AST: 12 U/L / GGT: x                 RADIOLOGY & ADDITIONAL STUDIES:      MICROBIOLOGY:  RECENT CULTURES:  12-19 .Surgical Swab Surgical Swab Enterococcus faecalis XXXX   Rare Enterococcus faecalis

## 2022-12-26 NOTE — PROGRESS NOTE ADULT - SUBJECTIVE AND OBJECTIVE BOX
Patient is a 72y old  Male who presents with a chief complaint of s/p cardiac arrest, hypoglycemia (26 Dec 2022 07:51)      INTERVAL HPI/OVERNIGHT EVENTS:  Pt was seen and examined.  Pt removed tracheostomy tube last night, now placed back, Maintain strain.      MEDICATIONS  (STANDING):  aspirin  chewable 81 milliGRAM(s) Oral daily  atorvastatin 20 milliGRAM(s) Oral at bedtime  bacitracin   Ointment 1 Application(s) Topical two times a day  budesonide  80 MICROgram(s)/formoterol 4.5 MICROgram(s) Inhaler 2 Puff(s) Inhalation two times a day  chlorhexidine 0.12% Liquid 15 milliLiter(s) Oral Mucosa every 12 hours  chlorhexidine 2% Cloths 1 Application(s) Topical <User Schedule>  collagenase Ointment 1 Application(s) Topical two times a day  cyanocobalamin 1000 MICROGram(s) Oral daily  enoxaparin Injectable 80 milliGRAM(s) SubCutaneous every 12 hours  folic acid 1 milliGRAM(s) Oral daily  insulin glargine Injectable (LANTUS) 20 Unit(s) SubCutaneous every morning  insulin lispro (ADMELOG) corrective regimen sliding scale   SubCutaneous every 6 hours  levETIRAcetam 1500 milliGRAM(s) Oral two times a day  metoprolol tartrate 25 milliGRAM(s) Oral two times a day  polyethylene glycol 3350 17 Gram(s) Oral daily  senna 2 Tablet(s) Oral at bedtime  silver sulfADIAZINE 1% Cream 1 Application(s) Topical two times a day  valproic  acid Syrup 500 milliGRAM(s) Enteral Tube every 8 hours    MEDICATIONS  (PRN):  acetaminophen     Tablet .. 650 milliGRAM(s) Oral every 6 hours PRN Temp greater or equal to 38C (100.4F), Mild Pain (1 - 3)  albuterol    90 MICROgram(s) HFA Inhaler 2 Puff(s) Inhalation every 6 hours PRN Shortness of Breath and/or Wheezing  aluminum hydroxide/magnesium hydroxide/simethicone Suspension 30 milliLiter(s) Oral every 4 hours PRN Dyspepsia      Allergies  No Known Allergies      Vital Signs Last 24 Hrs  T(C): 36.6 (26 Dec 2022 16:05), Max: 37.3 (26 Dec 2022 05:26)  T(F): 97.9 (26 Dec 2022 16:05), Max: 99.2 (26 Dec 2022 05:26)  HR: 70 (26 Dec 2022 16:05) (54 - 85)  BP: 115/69 (26 Dec 2022 16:05) (94/58 - 115/69)  BP(mean): --  RR: 18 (26 Dec 2022 16:05) (18 - 19)  SpO2: 100% (26 Dec 2022 16:05) (98% - 100%)        PHYSICAL EXAM:  GENERAL: NAD  HEAD:  Atraumatic  EYES: PERRLA  ENMT: Mouth moist  NECK: Supple  NERVOUS SYSTEM:  Awake, alert, following command  CHEST/LUNG: Diminished, on vent  HEART: RRR, S1, S2  ABDOMEN: Soft,  non tender, PEG in place  EXTREMITIES:  No edema B/L LE  SKIN: No rash          LABS:                        9.6    7.30  )-----------( 335      ( 26 Dec 2022 07:35 )             30.7     12-25    139  |  100  |  26<H>  ----------------------------<  159<H>  3.9   |  35<H>  |  0.84    Ca    8.6      25 Dec 2022 06:30  Phos  3.0     12-25    TPro  6.1  /  Alb  2.1<L>  /  TBili  0.2  /  DBili  x   /  AST  12<L>  /  ALT  15  /  AlkPhos  92  12-25    PT/INR - ( 25 Dec 2022 06:30 )   PT: 12.3 sec;   INR: 1.02 ratio             CAPILLARY BLOOD GLUCOSE      POCT Blood Glucose.: 115 mg/dL (26 Dec 2022 16:50)  POCT Blood Glucose.: 115 mg/dL (26 Dec 2022 16:36)  POCT Blood Glucose.: 172 mg/dL (26 Dec 2022 11:44)  POCT Blood Glucose.: 166 mg/dL (26 Dec 2022 07:43)  POCT Blood Glucose.: 129 mg/dL (26 Dec 2022 05:39)  POCT Blood Glucose.: 143 mg/dL (26 Dec 2022 03:36)  POCT Blood Glucose.: 161 mg/dL (25 Dec 2022 23:39)  POCT Blood Glucose.: 146 mg/dL (25 Dec 2022 21:14)      RADIOLOGY & ADDITIONAL TESTS:    Imaging Personally Reviewed:  [ ] YES  [ ] NO    Consultant(s) Notes Reviewed:  [ ] YES  [ ] NO    Care Discussed with Consultants/Other Providers [ ] YES  [ ] NO

## 2022-12-26 NOTE — PROGRESS NOTE ADULT - ASSESSMENT
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 Unknown how long pt unresponsive for at home. Blood sugar in the 40s with EMS s/p glucagon. On arrival to ER pt hypothermic and agonally breathing. Pt became unresponsive with loss of pulse; ACLS initiated. PEA arrest with ROSC after approximately 5 min s/p Epi x2.  Pt intubated during CODE BLUE. Per son pt on the day prior to presentation reported low blood sugar reads on his glucometer in the range of 80s. Son believes pt continued to take his insulin and oral diabetic regimen. Son states that pt was eating but may have been eating less in the last few days. Later on pt noted to have tonic-clonic Sz.     PEA arrest  s/p Epi x 2. ROSC achieved  CAD/ CHF history:  Takotsubo cardiomyopathy found on Echo   stable  On Coreg, Entresto/ Lasix/ ASA, Plavix at home , now on asa, metoprolol   12/13--cardio eval appreciated, outpt follow up    Acute hypoxemic respiratory failure now chronic  s/p intubation , failed extubation, now trach to vent  c/w vent managment  pulm consulted  Continue weaning trial, possibly can switch to trach collar next wk  12/12 trach sutures removed by surgery   Albuterol and Symbicort added for H/O COPD, asthma      LUE VTE with cellulitis /infected left hand hematoma s/p bedside debridement 12/13   LUE edema and wound of left hand ; good radial pulse   --LUE venous duplex : occlusive thrombus in the left mid subclavian vein with partial   slow flow detected in the lateral subclavian vein.  --LUE arterial Duplex neg   --on lovenox   --vascular following   --12/13 Hand surgery consult appreciated, s/p bedside debridement.   --ID consult appreciated  --antibiotic course completed     Sacral Ulcer  -s/p debridement on 12/19    New onset Seizure likely due to anoxic brain injury  discussed with Dr. Loo, patient will be only on Keppra 1500mg bid   off vimpat and Depakote   monitor     ELMER 2 to ATN; resolved    Shock liver.  due to PEA arrest. resolved    DM2   A1c 8.8%  continue with current management   FS goal 140-180   continue with PEG feeds     Hypophosphatemia --repleted     unstageable sacral pressure wound   s/p debridement     Normocytic anemia   no e/o bleeding or bruising   H/H stable   low normal Vit B12 and folic acid --supplement       Preventative measures   heparin gtt --dvt ppx  fall, aspiration  precautions , remove mittens.   HOBE     Dispo: patient is undocumented, will need PRUCOL

## 2022-12-27 LAB
ANION GAP SERPL CALC-SCNC: 2 MMOL/L — LOW (ref 5–17)
BUN SERPL-MCNC: 29 MG/DL — HIGH (ref 7–23)
CALCIUM SERPL-MCNC: 8.5 MG/DL — SIGNIFICANT CHANGE UP (ref 8.5–10.1)
CHLORIDE SERPL-SCNC: 103 MMOL/L — SIGNIFICANT CHANGE UP (ref 96–108)
CO2 SERPL-SCNC: 34 MMOL/L — HIGH (ref 22–31)
CREAT SERPL-MCNC: 0.9 MG/DL — SIGNIFICANT CHANGE UP (ref 0.5–1.3)
EGFR: 90 ML/MIN/1.73M2 — SIGNIFICANT CHANGE UP
GLUCOSE BLDC GLUCOMTR-MCNC: 124 MG/DL — HIGH (ref 70–99)
GLUCOSE BLDC GLUCOMTR-MCNC: 150 MG/DL — HIGH (ref 70–99)
GLUCOSE BLDC GLUCOMTR-MCNC: 156 MG/DL — HIGH (ref 70–99)
GLUCOSE BLDC GLUCOMTR-MCNC: 161 MG/DL — HIGH (ref 70–99)
GLUCOSE BLDC GLUCOMTR-MCNC: 167 MG/DL — HIGH (ref 70–99)
GLUCOSE SERPL-MCNC: 116 MG/DL — HIGH (ref 70–99)
HCT VFR BLD CALC: 32 % — LOW (ref 39–50)
HGB BLD-MCNC: 10 G/DL — LOW (ref 13–17)
MCHC RBC-ENTMCNC: 30.5 PG — SIGNIFICANT CHANGE UP (ref 27–34)
MCHC RBC-ENTMCNC: 31.3 G/DL — LOW (ref 32–36)
MCV RBC AUTO: 97.6 FL — SIGNIFICANT CHANGE UP (ref 80–100)
NRBC # BLD: 0 /100 WBCS — SIGNIFICANT CHANGE UP (ref 0–0)
PLATELET # BLD AUTO: 354 K/UL — SIGNIFICANT CHANGE UP (ref 150–400)
POTASSIUM SERPL-MCNC: 4.5 MMOL/L — SIGNIFICANT CHANGE UP (ref 3.5–5.3)
POTASSIUM SERPL-SCNC: 4.5 MMOL/L — SIGNIFICANT CHANGE UP (ref 3.5–5.3)
RBC # BLD: 3.28 M/UL — LOW (ref 4.2–5.8)
RBC # FLD: 15 % — HIGH (ref 10.3–14.5)
SODIUM SERPL-SCNC: 139 MMOL/L — SIGNIFICANT CHANGE UP (ref 135–145)
WBC # BLD: 8.56 K/UL — SIGNIFICANT CHANGE UP (ref 3.8–10.5)
WBC # FLD AUTO: 8.56 K/UL — SIGNIFICANT CHANGE UP (ref 3.8–10.5)

## 2022-12-27 PROCEDURE — 99232 SBSQ HOSP IP/OBS MODERATE 35: CPT

## 2022-12-27 RX ADMIN — BUDESONIDE AND FORMOTEROL FUMARATE DIHYDRATE 2 PUFF(S): 160; 4.5 AEROSOL RESPIRATORY (INHALATION) at 05:25

## 2022-12-27 RX ADMIN — ENOXAPARIN SODIUM 80 MILLIGRAM(S): 100 INJECTION SUBCUTANEOUS at 05:24

## 2022-12-27 RX ADMIN — Medication 81 MILLIGRAM(S): at 12:06

## 2022-12-27 RX ADMIN — Medication 1 MILLIGRAM(S): at 12:06

## 2022-12-27 RX ADMIN — LEVETIRACETAM 1500 MILLIGRAM(S): 250 TABLET, FILM COATED ORAL at 05:24

## 2022-12-27 RX ADMIN — Medication 2: at 17:10

## 2022-12-27 RX ADMIN — Medication 1 APPLICATION(S): at 17:12

## 2022-12-27 RX ADMIN — SENNA PLUS 2 TABLET(S): 8.6 TABLET ORAL at 21:14

## 2022-12-27 RX ADMIN — CHLORHEXIDINE GLUCONATE 15 MILLILITER(S): 213 SOLUTION TOPICAL at 17:10

## 2022-12-27 RX ADMIN — Medication 2: at 05:41

## 2022-12-27 RX ADMIN — PREGABALIN 1000 MICROGRAM(S): 225 CAPSULE ORAL at 12:06

## 2022-12-27 RX ADMIN — Medication 500 MILLIGRAM(S): at 05:24

## 2022-12-27 RX ADMIN — Medication 2: at 00:00

## 2022-12-27 RX ADMIN — ATORVASTATIN CALCIUM 20 MILLIGRAM(S): 80 TABLET, FILM COATED ORAL at 21:10

## 2022-12-27 RX ADMIN — CHLORHEXIDINE GLUCONATE 1 APPLICATION(S): 213 SOLUTION TOPICAL at 05:14

## 2022-12-27 RX ADMIN — Medication 1 APPLICATION(S): at 05:24

## 2022-12-27 RX ADMIN — Medication 500 MILLIGRAM(S): at 21:14

## 2022-12-27 RX ADMIN — Medication 1 APPLICATION(S): at 17:13

## 2022-12-27 RX ADMIN — CHLORHEXIDINE GLUCONATE 15 MILLILITER(S): 213 SOLUTION TOPICAL at 05:23

## 2022-12-27 RX ADMIN — ENOXAPARIN SODIUM 80 MILLIGRAM(S): 100 INJECTION SUBCUTANEOUS at 17:10

## 2022-12-27 RX ADMIN — Medication 1 APPLICATION(S): at 17:11

## 2022-12-27 RX ADMIN — Medication 25 MILLIGRAM(S): at 17:11

## 2022-12-27 RX ADMIN — LEVETIRACETAM 1500 MILLIGRAM(S): 250 TABLET, FILM COATED ORAL at 17:11

## 2022-12-27 RX ADMIN — INSULIN GLARGINE 20 UNIT(S): 100 INJECTION, SOLUTION SUBCUTANEOUS at 08:26

## 2022-12-27 RX ADMIN — Medication 1 APPLICATION(S): at 05:21

## 2022-12-27 RX ADMIN — Medication 500 MILLIGRAM(S): at 13:07

## 2022-12-27 RX ADMIN — BUDESONIDE AND FORMOTEROL FUMARATE DIHYDRATE 2 PUFF(S): 160; 4.5 AEROSOL RESPIRATORY (INHALATION) at 17:11

## 2022-12-27 NOTE — PROGRESS NOTE ADULT - SUBJECTIVE AND OBJECTIVE BOX
Patient is a 72y old  Male who presents with a chief complaint of s/p cardiac arrest, hypoglycemia (27 Dec 2022 08:05)      INTERVAL HPI/OVERNIGHT EVENTS:  Pt was seen and examined, no acute events.      MEDICATIONS  (STANDING):  aspirin  chewable 81 milliGRAM(s) Oral daily  atorvastatin 20 milliGRAM(s) Oral at bedtime  bacitracin   Ointment 1 Application(s) Topical two times a day  budesonide  80 MICROgram(s)/formoterol 4.5 MICROgram(s) Inhaler 2 Puff(s) Inhalation two times a day  chlorhexidine 0.12% Liquid 15 milliLiter(s) Oral Mucosa every 12 hours  chlorhexidine 2% Cloths 1 Application(s) Topical <User Schedule>  collagenase Ointment 1 Application(s) Topical two times a day  cyanocobalamin 1000 MICROGram(s) Oral daily  enoxaparin Injectable 80 milliGRAM(s) SubCutaneous every 12 hours  folic acid 1 milliGRAM(s) Oral daily  insulin glargine Injectable (LANTUS) 20 Unit(s) SubCutaneous every morning  insulin lispro (ADMELOG) corrective regimen sliding scale   SubCutaneous every 6 hours  levETIRAcetam 1500 milliGRAM(s) Oral two times a day  metoprolol tartrate 25 milliGRAM(s) Oral two times a day  polyethylene glycol 3350 17 Gram(s) Oral daily  senna 2 Tablet(s) Oral at bedtime  silver sulfADIAZINE 1% Cream 1 Application(s) Topical two times a day  valproic  acid Syrup 500 milliGRAM(s) Enteral Tube every 8 hours    MEDICATIONS  (PRN):  acetaminophen     Tablet .. 650 milliGRAM(s) Oral every 6 hours PRN Temp greater or equal to 38C (100.4F), Mild Pain (1 - 3)  albuterol    90 MICROgram(s) HFA Inhaler 2 Puff(s) Inhalation every 6 hours PRN Shortness of Breath and/or Wheezing  aluminum hydroxide/magnesium hydroxide/simethicone Suspension 30 milliLiter(s) Oral every 4 hours PRN Dyspepsia      Allergies  No Known Allergies      Vital Signs Last 24 Hrs  T(C): 36.8 (27 Dec 2022 16:41), Max: 37.6 (26 Dec 2022 23:39)  T(F): 98.3 (27 Dec 2022 16:41), Max: 99.7 (26 Dec 2022 23:39)  HR: 85 (27 Dec 2022 16:41) (60 - 85)  BP: 110/72 (27 Dec 2022 16:41) (103/57 - 127/84)  BP(mean): --  RR: 18 (27 Dec 2022 16:41) (18 - 19)  SpO2: 100% (27 Dec 2022 16:41) (98% - 100%)        PHYSICAL EXAM:  GENERAL: NAD  HEAD:  Atraumatic  EYES: PERRLA  ENMT: Mouth moist  NECK: Supple  NERVOUS SYSTEM:  Awake, alert, following command  CHEST/LUNG: Diminished, on vent  HEART: RRR, S1, S2  ABDOMEN: Soft,  non tender, PEG in place  EXTREMITIES:  No edema B/L LE  SKIN: No rash        LABS:                        10.0   8.56  )-----------( 354      ( 27 Dec 2022 07:19 )             32.0         CAPILLARY BLOOD GLUCOSE      POCT Blood Glucose.: 156 mg/dL (27 Dec 2022 16:34)  POCT Blood Glucose.: 150 mg/dL (27 Dec 2022 11:12)  POCT Blood Glucose.: 161 mg/dL (27 Dec 2022 08:13)  POCT Blood Glucose.: 167 mg/dL (27 Dec 2022 05:33)  POCT Blood Glucose.: 164 mg/dL (26 Dec 2022 23:55)      RADIOLOGY & ADDITIONAL TESTS:    Imaging Personally Reviewed:  [ ] YES  [ ] NO    Consultant(s) Notes Reviewed:  [ ] YES  [ ] NO    Care Discussed with Consultants/Other Providers [ ] YES  [ ] NO

## 2022-12-27 NOTE — PROGRESS NOTE ADULT - ASSESSMENT
REVIEW OF SYMPTOMS      Able to give (reliable) ROS  NO     PHYSICAL EXAM    HEENT Unremarkable  atraumatic   RESP Fair air entry EXP prolonged    Harsh breath sound Resp distres mild   CARDIAC S1 S2 No S3     NO JVD    ABDOMEN SOFT BS PRESENT NOT DISTENDED No hepatosplenomegaly   PEDAL EDEMA present No calf tenderness  NO rash       GENERAL DATA .   GOC.  12/11/2022 full code       ALLGY.  nka                            WT. ..  12/2/2022 86  BMI. ..    12/2/2022 29                          ICU STAY.  .. 11/29-12/9  COVID.   .. 12/2/2022 scv2 (-)   .. 11/20/2022 scv2 (-)   BEST PRACTICE ISSUES.    HOB ELEVATN. Yes  DVT PPLX.   12/15/2022 lvnx 80.2 ( l sc thrombus)    ALEJO PPLX.   ..      INFN PPLX.   .. 11/25 chlorhex .12%   .. 11/14 chlorhex 2%   SP SW ANTWAN.         DIET.    ..  12/15 glucerna 1.2 1440 gt   IV fl.  ..            PROCEDURES.  .. 12/19 debridement of sacral wound   .. 12/5/2022 trach  .. 12/5/2022 peg   .. 12/12 r arm midline       ABGS.  12/3/2022 ac 14/450/.3/5 750/46/75      VS/ PO/IO/ VENT/ DRIPS.  12/27/2022 afeb 110/70   12/27/2022 ac 14/ 450/5/.3    MAIN ISSUES.  72 m doa 11/14/2022 cac  PMH CAD s/p PCI with stent 2015 and CABG 2014,   PMH  s/p medtronic PPM,   PMH CVA (lacunar infarct)    1) PEA arrest with ROSC after approximately 5 min 11/14  Now communicative   2) DVT 12/12 l Subclav thromS 12/15/2022 lvnx 80.2  3) TRACH 12/5/2022 trach  4) PEG12/5/2022 peg   5) Cellulitis hand absc 12/13 mod enterococcus fecalis  staph epi 12/12-12/15/2022  cephalexin 12/15 vanco once  12/16-12/19 zosyn  6) Vent weaning     PROBLEM ASSESSMENT RECOMMENDATIONS..  Sp  cac 11/14/2022   .. Patient is interactive and answers questions appropriately as dw son on 12/13/2022   VTE.  .. 12/12/2022 Pt found to have l subclav dvt    12/12/2022 iv ufh startd   .. 12/15/2022 changed to lvnx   Trach 12/5   .. trach care   COPD.  .. 12/22/2022 symbicort 80   vent management.  .. vent bundle dsbt dsv target pplat 30 (-) PO2 60 (+) pH 730 (+)   weaming.  .. 12/13-12/14-12/16/2022 tolerated cpap 12/13-12/14-12/16/20 rsbi 78-94-71  .. 12/15/2022 tolerated cpap 4 h  .. 12/18/2022 dw rt Pt not tolerating cpap gets agitated after few min on 12/17    .. 12/22/2022 dw resp and with pt son bedside Pt has not been tolerating cpap   .. 12/24/2022 dw rt Try cpap 5 ps5 and if toerates will place on tc 12/25 12/25/2022 failed cpap 5/5/ on 12/24   Infection.  ..  Monitor for vap    cellulitis hand   .. w 12/24-12/27/2022 w 8.9- 8.5    .. absc 12/13 mod enterococcus fecalis  staph epi   .. sp course of abio   CAD.  .. 11/14 asa 81   .. 12/13 metoprolol 25.2   .. Monitor   CHF.   .. 11/14/2022 echo mod decr segmental lvsf apical lateral apiccal ant segment are abn takotsubo cmpthy pasp 42 .  .. monitor for chf   Dysphagia.  .. 12/24/2022 speech eval requestd   anemia.  .. Hb 12/16-12/17-12/18-12/19-12/20-12/23-12/-12/27/2022   Hb 8.9 - 9.5- 8.9  - 9.2 - 9.1 - 8.8 - 9.9 - 10   .. Monitor  Target Hb 7 (+)   DECUB.  .. 12/2 collagenase   SEIZURES.  .. sz meds as guided by neuro     TIME SPENT   Over 25 minutes aggregate care time spent on encounter; activities included   direct patient care, counseling and/or coordinating care reviewing notes, lab data/ imaging , discussion with multidisciplinary team/ patient  /family and explaining in detail risks, benefits, alternatives  of the recommendations     MARQUISE TAYLOR 72 m 11/14/2022 1948 Dr Leela Dowling

## 2022-12-27 NOTE — PROGRESS NOTE ADULT - ASSESSMENT
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 Unknown how long pt unresponsive for at home. Blood sugar in the 40s with EMS s/p glucagon. On arrival to ER pt hypothermic and agonally breathing. Pt became unresponsive with loss of pulse; ACLS initiated. PEA arrest with ROSC after approximately 5 min s/p Epi x2.  Pt intubated during CODE BLUE. Per son pt on the day prior to presentation reported low blood sugar reads on his glucometer in the range of 80s. Son believes pt continued to take his insulin and oral diabetic regimen. Son states that pt was eating but may have been eating less in the last few days. Later on pt noted to have tonic-clonic Sz.     PEA arrest  s/p Epi x 2. ROSC achieved  CAD/ CHF history:  Takotsubo cardiomyopathy found on Echo   stable  On Coreg, Entresto/ Lasix/ ASA, Plavix at home, now on asa, metoprolol   12/13--cardio eval appreciated, outpt follow up    Acute hypoxemic respiratory failure now chronic  s/p intubation , failed extubation, now trach to vent  c/w vent managment  pulm consulted  Continue weaning trial, Pt extubated overnight 2 nights ago, trach placed back in, possibly can switch to trach collar next wk  12/12 trach sutures removed by surgery   Albuterol and Symbicort added for H/O COPD, asthma      LUE VTE with cellulitis /infected left hand hematoma s/p bedside debridement 12/13   LUE edema and wound of left hand ; good radial pulse   --LUE venous duplex : occlusive thrombus in the left mid subclavian vein with partial   slow flow detected in the lateral subclavian vein.  --LUE arterial Duplex neg   --on lovenox   --vascular following   --12/13 Hand surgery consult appreciated, s/p bedside debridement.   --ID consult appreciated  --antibiotic course completed     Sacral Ulcer  -s/p debridement on 12/19    New onset Seizure likely due to anoxic brain injury  discussed with Dr. Loo, patient will be only on Keppra 1500mg bid   off vimpat and Depakote   monitor     ELMER 2 to ATN; resolved    Shock liver.  due to PEA arrest. resolved    DM2   A1c 8.8%  continue with current management   FS goal 140-180   continue with PEG feeds     Hypophosphatemia --repleted     unstageable sacral pressure wound   s/p debridement     Normocytic anemia   no e/o bleeding or bruising   H/H stable   low normal Vit B12 and folic acid --supplement       Preventative measures   heparin gtt --dvt ppx  fall, aspiration  precautions , remove mittens.   HOBE     Dispo: patient is undocumented, will need PRUCOL

## 2022-12-27 NOTE — PROGRESS NOTE ADULT - SUBJECTIVE AND OBJECTIVE BOX
BRANDON CAMARILLO    [address] [address]    Allergies    No Known Allergies    Intolerances        PAST MEDICAL & SURGICAL HISTORY:  HTN (hypertension)      HLD (hyperlipidemia)      Diabetes mellitus      Lacunar infarction      BPH (benign prostatic hyperplasia)      CHF (congestive heart failure)      Chronic obstructive pulmonary disease, unspecified COPD type      S/P CABG (coronary artery bypass graft)  2014          FAMILY HISTORY:      Home Medications:  aspirin 81 mg oral delayed release tablet: 1 tab(s) orally once a day (12 Jun 2020 17:35)  Liquifilm Tears preserved ophthalmic solution: 1 drop(s) to each affected eye 2 times a day (12 Jun 2020 17:35)      MEDICATIONS  (STANDING):  aspirin  chewable 81 milliGRAM(s) Oral daily  atorvastatin 20 milliGRAM(s) Oral at bedtime  bacitracin   Ointment 1 Application(s) Topical two times a day  budesonide  80 MICROgram(s)/formoterol 4.5 MICROgram(s) Inhaler 2 Puff(s) Inhalation two times a day  chlorhexidine 0.12% Liquid 15 milliLiter(s) Oral Mucosa every 12 hours  chlorhexidine 2% Cloths 1 Application(s) Topical <User Schedule>  collagenase Ointment 1 Application(s) Topical two times a day  cyanocobalamin 1000 MICROGram(s) Oral daily  enoxaparin Injectable 80 milliGRAM(s) SubCutaneous every 12 hours  folic acid 1 milliGRAM(s) Oral daily  insulin glargine Injectable (LANTUS) 20 Unit(s) SubCutaneous every morning  insulin lispro (ADMELOG) corrective regimen sliding scale   SubCutaneous every 6 hours  levETIRAcetam 1500 milliGRAM(s) Oral two times a day  metoprolol tartrate 25 milliGRAM(s) Oral two times a day  polyethylene glycol 3350 17 Gram(s) Oral daily  senna 2 Tablet(s) Oral at bedtime  silver sulfADIAZINE 1% Cream 1 Application(s) Topical two times a day  valproic  acid Syrup 500 milliGRAM(s) Enteral Tube every 8 hours    MEDICATIONS  (PRN):  acetaminophen     Tablet .. 650 milliGRAM(s) Oral every 6 hours PRN Temp greater or equal to 38C (100.4F), Mild Pain (1 - 3)  albuterol    90 MICROgram(s) HFA Inhaler 2 Puff(s) Inhalation every 6 hours PRN Shortness of Breath and/or Wheezing  aluminum hydroxide/magnesium hydroxide/simethicone Suspension 30 milliLiter(s) Oral every 4 hours PRN Dyspepsia      Diet, NPO with Tube Feed:   Tube Feeding Modality: Gastrostomy  Glucerna 1.2 Pedrito  Total Volume for 24 Hours (mL): 1440  Continuous  Until Goal Tube Feed Rate (mL per Hour): 60  Tube Feed Duration (in Hours): 24  Tube Feed Start Time: 12:15  Free Water Flush Instructions:  automatic water flus via pump @ 20 ml/hr (12-23-22 @ 12:08) [Active]          Vital Signs Last 24 Hrs  T(C): 37.3 (27 Dec 2022 04:36), Max: 37.6 (26 Dec 2022 23:39)  T(F): 99.2 (27 Dec 2022 04:36), Max: 99.7 (26 Dec 2022 23:39)  HR: 70 (27 Dec 2022 05:31) (54 - 81)  BP: 109/64 (27 Dec 2022 04:36) (103/57 - 115/69)  BP(mean): --  RR: 19 (27 Dec 2022 04:36) (18 - 19)  SpO2: 98% (27 Dec 2022 05:31) (98% - 100%)          12-26-22 @ 07:01  -  12-27-22 @ 07:00  --------------------------------------------------------  IN: 1500 mL / OUT: 450 mL / NET: 1050 mL        Mode: AC/ CMV (Assist Control/ Continuous Mandatory Ventilation), RR (machine): 14, TV (machine): 450, FiO2: 30, PEEP: 5, ITime: 1, MAP: 10, PIP: 20      LABS:                        9.6    7.30  )-----------( 335      ( 26 Dec 2022 07:35 )             30.7                     WBC:  WBC Count: 7.30 K/uL (12-26 @ 07:35)  WBC Count: 7.23 K/uL (12-25 @ 06:30)  WBC Count: 8.99 K/uL (12-24 @ 08:55)      MICROBIOLOGY:  RECENT CULTURES:                  Sodium:  Sodium, Serum: 139 mmol/L (12-25 @ 06:30)      0.84 mg/dL 12-25 @ 06:30      Hemoglobin:  Hemoglobin: 9.6 g/dL (12-26 @ 07:35)  Hemoglobin: 9.9 g/dL (12-25 @ 06:30)  Hemoglobin: 10.2 g/dL (12-24 @ 08:55)      Platelets: Platelet Count - Automated: 335 K/uL (12-26 @ 07:35)  Platelet Count - Automated: 377 K/uL (12-25 @ 06:30)  Platelet Count - Automated: 335 K/uL (12-24 @ 08:55)              RADIOLOGY & ADDITIONAL STUDIES:      MICROBIOLOGY:  RECENT CULTURES:

## 2022-12-28 LAB
ANION GAP SERPL CALC-SCNC: 3 MMOL/L — LOW (ref 5–17)
BUN SERPL-MCNC: 28 MG/DL — HIGH (ref 7–23)
CALCIUM SERPL-MCNC: 8.3 MG/DL — LOW (ref 8.5–10.1)
CHLORIDE SERPL-SCNC: 104 MMOL/L — SIGNIFICANT CHANGE UP (ref 96–108)
CO2 SERPL-SCNC: 35 MMOL/L — HIGH (ref 22–31)
CREAT SERPL-MCNC: 0.81 MG/DL — SIGNIFICANT CHANGE UP (ref 0.5–1.3)
EGFR: 93 ML/MIN/1.73M2 — SIGNIFICANT CHANGE UP
GLUCOSE BLDC GLUCOMTR-MCNC: 112 MG/DL — HIGH (ref 70–99)
GLUCOSE BLDC GLUCOMTR-MCNC: 113 MG/DL — HIGH (ref 70–99)
GLUCOSE BLDC GLUCOMTR-MCNC: 116 MG/DL — HIGH (ref 70–99)
GLUCOSE BLDC GLUCOMTR-MCNC: 119 MG/DL — HIGH (ref 70–99)
GLUCOSE BLDC GLUCOMTR-MCNC: 150 MG/DL — HIGH (ref 70–99)
GLUCOSE BLDC GLUCOMTR-MCNC: 154 MG/DL — HIGH (ref 70–99)
GLUCOSE BLDC GLUCOMTR-MCNC: 98 MG/DL — SIGNIFICANT CHANGE UP (ref 70–99)
GLUCOSE SERPL-MCNC: 98 MG/DL — SIGNIFICANT CHANGE UP (ref 70–99)
HCT VFR BLD CALC: 29.3 % — LOW (ref 39–50)
HGB BLD-MCNC: 9.3 G/DL — LOW (ref 13–17)
MCHC RBC-ENTMCNC: 30.6 PG — SIGNIFICANT CHANGE UP (ref 27–34)
MCHC RBC-ENTMCNC: 31.7 G/DL — LOW (ref 32–36)
MCV RBC AUTO: 96.4 FL — SIGNIFICANT CHANGE UP (ref 80–100)
NRBC # BLD: 0 /100 WBCS — SIGNIFICANT CHANGE UP (ref 0–0)
PLATELET # BLD AUTO: 311 K/UL — SIGNIFICANT CHANGE UP (ref 150–400)
POTASSIUM SERPL-MCNC: 4 MMOL/L — SIGNIFICANT CHANGE UP (ref 3.5–5.3)
POTASSIUM SERPL-SCNC: 4 MMOL/L — SIGNIFICANT CHANGE UP (ref 3.5–5.3)
RBC # BLD: 3.04 M/UL — LOW (ref 4.2–5.8)
RBC # FLD: 14.7 % — HIGH (ref 10.3–14.5)
SODIUM SERPL-SCNC: 142 MMOL/L — SIGNIFICANT CHANGE UP (ref 135–145)
WBC # BLD: 6.93 K/UL — SIGNIFICANT CHANGE UP (ref 3.8–10.5)
WBC # FLD AUTO: 6.93 K/UL — SIGNIFICANT CHANGE UP (ref 3.8–10.5)

## 2022-12-28 PROCEDURE — 99232 SBSQ HOSP IP/OBS MODERATE 35: CPT

## 2022-12-28 RX ADMIN — CHLORHEXIDINE GLUCONATE 15 MILLILITER(S): 213 SOLUTION TOPICAL at 05:12

## 2022-12-28 RX ADMIN — Medication 1 APPLICATION(S): at 17:46

## 2022-12-28 RX ADMIN — Medication 500 MILLIGRAM(S): at 21:02

## 2022-12-28 RX ADMIN — Medication 1 APPLICATION(S): at 05:15

## 2022-12-28 RX ADMIN — LEVETIRACETAM 1500 MILLIGRAM(S): 250 TABLET, FILM COATED ORAL at 05:13

## 2022-12-28 RX ADMIN — ENOXAPARIN SODIUM 80 MILLIGRAM(S): 100 INJECTION SUBCUTANEOUS at 17:46

## 2022-12-28 RX ADMIN — BUDESONIDE AND FORMOTEROL FUMARATE DIHYDRATE 2 PUFF(S): 160; 4.5 AEROSOL RESPIRATORY (INHALATION) at 17:23

## 2022-12-28 RX ADMIN — INSULIN GLARGINE 20 UNIT(S): 100 INJECTION, SOLUTION SUBCUTANEOUS at 08:55

## 2022-12-28 RX ADMIN — ATORVASTATIN CALCIUM 20 MILLIGRAM(S): 80 TABLET, FILM COATED ORAL at 21:07

## 2022-12-28 RX ADMIN — Medication 1 APPLICATION(S): at 05:13

## 2022-12-28 RX ADMIN — Medication 1 MILLIGRAM(S): at 11:29

## 2022-12-28 RX ADMIN — ENOXAPARIN SODIUM 80 MILLIGRAM(S): 100 INJECTION SUBCUTANEOUS at 05:19

## 2022-12-28 RX ADMIN — LEVETIRACETAM 1500 MILLIGRAM(S): 250 TABLET, FILM COATED ORAL at 17:46

## 2022-12-28 RX ADMIN — PREGABALIN 1000 MICROGRAM(S): 225 CAPSULE ORAL at 11:29

## 2022-12-28 RX ADMIN — CHLORHEXIDINE GLUCONATE 1 APPLICATION(S): 213 SOLUTION TOPICAL at 05:12

## 2022-12-28 RX ADMIN — POLYETHYLENE GLYCOL 3350 17 GRAM(S): 17 POWDER, FOR SOLUTION ORAL at 11:29

## 2022-12-28 RX ADMIN — BUDESONIDE AND FORMOTEROL FUMARATE DIHYDRATE 2 PUFF(S): 160; 4.5 AEROSOL RESPIRATORY (INHALATION) at 05:11

## 2022-12-28 RX ADMIN — SENNA PLUS 2 TABLET(S): 8.6 TABLET ORAL at 21:02

## 2022-12-28 RX ADMIN — Medication 1 APPLICATION(S): at 17:48

## 2022-12-28 RX ADMIN — Medication 500 MILLIGRAM(S): at 05:11

## 2022-12-28 RX ADMIN — Medication 1 APPLICATION(S): at 05:14

## 2022-12-28 RX ADMIN — Medication 81 MILLIGRAM(S): at 11:29

## 2022-12-28 RX ADMIN — Medication 2: at 17:45

## 2022-12-28 RX ADMIN — Medication 1 APPLICATION(S): at 17:47

## 2022-12-28 RX ADMIN — CHLORHEXIDINE GLUCONATE 15 MILLILITER(S): 213 SOLUTION TOPICAL at 17:47

## 2022-12-28 RX ADMIN — Medication 25 MILLIGRAM(S): at 17:47

## 2022-12-28 RX ADMIN — Medication 500 MILLIGRAM(S): at 14:25

## 2022-12-28 NOTE — PROGRESS NOTE ADULT - SUBJECTIVE AND OBJECTIVE BOX
BRANDON CAMARILLO    [address] [address]    Allergies    No Known Allergies    Intolerances        PAST MEDICAL & SURGICAL HISTORY:  HTN (hypertension)      HLD (hyperlipidemia)      Diabetes mellitus      Lacunar infarction      BPH (benign prostatic hyperplasia)      CHF (congestive heart failure)      Chronic obstructive pulmonary disease, unspecified COPD type      S/P CABG (coronary artery bypass graft)  2014          FAMILY HISTORY:      Home Medications:  aspirin 81 mg oral delayed release tablet: 1 tab(s) orally once a day (12 Jun 2020 17:35)  Liquifilm Tears preserved ophthalmic solution: 1 drop(s) to each affected eye 2 times a day (12 Jun 2020 17:35)      MEDICATIONS  (STANDING):  aspirin  chewable 81 milliGRAM(s) Oral daily  atorvastatin 20 milliGRAM(s) Oral at bedtime  bacitracin   Ointment 1 Application(s) Topical two times a day  budesonide  80 MICROgram(s)/formoterol 4.5 MICROgram(s) Inhaler 2 Puff(s) Inhalation two times a day  chlorhexidine 0.12% Liquid 15 milliLiter(s) Oral Mucosa every 12 hours  chlorhexidine 2% Cloths 1 Application(s) Topical <User Schedule>  collagenase Ointment 1 Application(s) Topical two times a day  cyanocobalamin 1000 MICROGram(s) Oral daily  enoxaparin Injectable 80 milliGRAM(s) SubCutaneous every 12 hours  folic acid 1 milliGRAM(s) Oral daily  insulin glargine Injectable (LANTUS) 20 Unit(s) SubCutaneous every morning  insulin lispro (ADMELOG) corrective regimen sliding scale   SubCutaneous every 6 hours  levETIRAcetam 1500 milliGRAM(s) Oral two times a day  metoprolol tartrate 25 milliGRAM(s) Oral two times a day  polyethylene glycol 3350 17 Gram(s) Oral daily  senna 2 Tablet(s) Oral at bedtime  silver sulfADIAZINE 1% Cream 1 Application(s) Topical two times a day  valproic  acid Syrup 500 milliGRAM(s) Enteral Tube every 8 hours    MEDICATIONS  (PRN):  acetaminophen     Tablet .. 650 milliGRAM(s) Oral every 6 hours PRN Temp greater or equal to 38C (100.4F), Mild Pain (1 - 3)  albuterol    90 MICROgram(s) HFA Inhaler 2 Puff(s) Inhalation every 6 hours PRN Shortness of Breath and/or Wheezing  aluminum hydroxide/magnesium hydroxide/simethicone Suspension 30 milliLiter(s) Oral every 4 hours PRN Dyspepsia      Diet, NPO with Tube Feed:   Tube Feeding Modality: Gastrostomy  Glucerna 1.2 Pedrito  Total Volume for 24 Hours (mL): 1440  Continuous  Until Goal Tube Feed Rate (mL per Hour): 60  Tube Feed Duration (in Hours): 24  Tube Feed Start Time: 12:15  Free Water Flush Instructions:  automatic water flus via pump @ 20 ml/hr (12-23-22 @ 12:08) [Active]          Vital Signs Last 24 Hrs  T(C): 36.8 (28 Dec 2022 04:37), Max: 37.3 (27 Dec 2022 11:03)  T(F): 98.3 (28 Dec 2022 04:37), Max: 99.2 (27 Dec 2022 11:03)  HR: 60 (28 Dec 2022 06:00) (60 - 85)  BP: 108/68 (28 Dec 2022 04:37) (108/68 - 127/84)  BP(mean): --  RR: 18 (28 Dec 2022 04:37) (18 - 20)  SpO2: 100% (28 Dec 2022 06:00) (98% - 100%)          12-27-22 @ 07:01  -  12-28-22 @ 07:00  --------------------------------------------------------  IN: 2160 mL / OUT: 0 mL / NET: 2160 mL        Mode: AC/ CMV (Assist Control/ Continuous Mandatory Ventilation), RR (machine): 14, TV (machine): 450, FiO2: 30, PEEP: 5, ITime: 1, MAP: 10, PIP: 29      LABS:                        10.0   8.56  )-----------( 354      ( 27 Dec 2022 07:19 )             32.0     12-27    139  |  103  |  29<H>  ----------------------------<  116<H>  4.5   |  34<H>  |  0.90    Ca    8.5      27 Dec 2022 21:40                WBC:  WBC Count: 8.56 K/uL (12-27 @ 07:19)  WBC Count: 7.30 K/uL (12-26 @ 07:35)  WBC Count: 7.23 K/uL (12-25 @ 06:30)  WBC Count: 8.99 K/uL (12-24 @ 08:55)      MICROBIOLOGY:  RECENT CULTURES:                  Sodium:  Sodium, Serum: 139 mmol/L (12-27 @ 21:40)  Sodium, Serum: 139 mmol/L (12-25 @ 06:30)      0.90 mg/dL 12-27 @ 21:40  0.84 mg/dL 12-25 @ 06:30      Hemoglobin:  Hemoglobin: 10.0 g/dL (12-27 @ 07:19)  Hemoglobin: 9.6 g/dL (12-26 @ 07:35)  Hemoglobin: 9.9 g/dL (12-25 @ 06:30)  Hemoglobin: 10.2 g/dL (12-24 @ 08:55)      Platelets: Platelet Count - Automated: 354 K/uL (12-27 @ 07:19)  Platelet Count - Automated: 335 K/uL (12-26 @ 07:35)  Platelet Count - Automated: 377 K/uL (12-25 @ 06:30)  Platelet Count - Automated: 335 K/uL (12-24 @ 08:55)              RADIOLOGY & ADDITIONAL STUDIES:      MICROBIOLOGY:  RECENT CULTURES:

## 2022-12-28 NOTE — PROGRESS NOTE ADULT - SUBJECTIVE AND OBJECTIVE BOX
Patient is a 72y old  Male who presents with a chief complaint of s/p cardiac arrest, hypoglycemia (28 Dec 2022 07:30)    HPI:  Mr. Hamilton is a 72 year old male with a PMH of lacunar infarct, CHF (EF ~40% per son), CAD s/p PCI with stent and CABGx3 (~2020), s/p PPM (medtronic), HTN, COPD, DMII on insulin and PO meds, BPH, and CKD (stage II?) presenting to ICU s/p cardiac arrest in ED. Per patient son he has recently been doing well and in his normal state of health, however, yesterday Mr. Hamilton started c/o low blood sugars on glucometer. Pt reportedly continued to take his home medications, including both oral antidiabetic agents and insulin (both long and short acting). Today his son found him minimally responsive hanging off his bed, checked his glucose and it read "low," so he called 911. In ED pt was noted to be hypothermic with glucose noted to be 70 just prior to PEA arrest. Pt was coded for ~5 minutes with 2 epi given with ROSC. Pt intubated at that time. is moving all extremities equally and with reactive, equal pupils. He remained hypothermic and is not requiring vasopressor support. Pending CT Scans. ROS otherwise negative per son besides hypoglycemia, with no known sick contacts. Pt accepted to ICU for further management / care.  (14 Nov 2022 01:33)    SUBJECTIVE & OBJECTIVE: Pt seen and examined at bedside.   PHYSICAL EXAM:  T(C): 36.9 (12-28-22 @ 10:45), Max: 36.9 (12-28-22 @ 10:45)  HR: 62 (12-28-22 @ 13:30) (60 - 85)  BP: 108/62 (12-28-22 @ 10:45) (108/62 - 113/72)  RR: 20 (12-28-22 @ 10:45) (18 - 20)  SpO2: 100% (12-28-22 @ 13:30) (98% - 100%) Device: BV, Mode: CPAP with PS, RR (patient): 24, FiO2: 30, PEEP: 5, PS: 5, MAP: 8, PIP: 15Daily     Daily I&O's Detail    27 Dec 2022 07:01  -  28 Dec 2022 07:00  --------------------------------------------------------  IN:    Enteral Tube Flush: 720 mL    Glucerna: 1440 mL  Total IN: 2160 mL    OUT:  Total OUT: 0 mL    Total NET: 2160 mL        GENERAL: NAD, well-groomed, well-developed  HEAD:  Atraumatic, Normocephalic  EYES: EOMI, PERRLA, conjunctiva and sclera clear  ENMT: trach in place to vent  NECK: Supple, No JVD  NERVOUS SYSTEM:  Alert & Oriented X3, Motor Strength 3/5 B/L upper and lower extremities; DTRs 2+ intact and symmetric  CHEST/LUNG: Coarse breath sounds bilaterally  HEART: Regular rate and rhythm; No murmurs, rubs, or gallops  ABDOMEN: Soft, Nontender, Nondistended; Bowel sounds present  EXTREMITIES:  2+ Peripheral Pulses, No clubbing, cyanosis, or edema  LABS:                        9.3    6.93  )-----------( 311      ( 28 Dec 2022 12:32 )             29.3   CAPILLARY BLOOD GLUCOSE      POCT Blood Glucose.: 112 mg/dL (28 Dec 2022 11:31)  POCT Blood Glucose.: 98 mg/dL (28 Dec 2022 08:46)  POCT Blood Glucose.: 113 mg/dL (28 Dec 2022 05:54)  POCT Blood Glucose.: 150 mg/dL (27 Dec 2022 23:59)  POCT Blood Glucose.: 124 mg/dL (27 Dec 2022 20:59)  POCT Blood Glucose.: 156 mg/dL (27 Dec 2022 16:34)    RECENT CULTURES:   RADIOLOGY & ADDITIONAL TESTS:   Patient is a 72y old  Male who presents with a chief complaint of s/p cardiac arrest, hypoglycemia (28 Dec 2022 07:30)    HPI:  Mr. Hamilton is a 72 year old male with a PMH of lacunar infarct, CHF (EF ~40% per son), CAD s/p PCI with stent and CABGx3 (~2020), s/p PPM (medtronic), HTN, COPD, DMII on insulin and PO meds, BPH, and CKD (stage II?) presenting to ICU s/p cardiac arrest in ED. Per patient son he has recently been doing well and in his normal state of health, however, yesterday Mr. Hamilton started c/o low blood sugars on glucometer. Pt reportedly continued to take his home medications, including both oral antidiabetic agents and insulin (both long and short acting). Today his son found him minimally responsive hanging off his bed, checked his glucose and it read "low," so he called 911. In ED pt was noted to be hypothermic with glucose noted to be 70 just prior to PEA arrest. Pt was coded for ~5 minutes with 2 epi given with ROSC. Pt intubated at that time. is moving all extremities equally and with reactive, equal pupils. He remained hypothermic and is not requiring vasopressor support. Pending CT Scans. ROS otherwise negative per son besides hypoglycemia, with no known sick contacts. Pt accepted to ICU for further management / care.  (14 Nov 2022 01:33)    SUBJECTIVE & OBJECTIVE: Pt seen and examined at bedside. He is doing well, afebrile  PHYSICAL EXAM:  T(C): 36.9 (12-28-22 @ 10:45), Max: 36.9 (12-28-22 @ 10:45)  HR: 62 (12-28-22 @ 13:30) (60 - 85)  BP: 108/62 (12-28-22 @ 10:45) (108/62 - 113/72)  RR: 20 (12-28-22 @ 10:45) (18 - 20)  SpO2: 100% (12-28-22 @ 13:30) (98% - 100%) Device: BV, Mode: CPAP with PS, RR (patient): 24, FiO2: 30, PEEP: 5, PS: 5, MAP: 8, PIP: 15Daily     Daily I&O's Detail    27 Dec 2022 07:01  -  28 Dec 2022 07:00  --------------------------------------------------------  IN:    Enteral Tube Flush: 720 mL    Glucerna: 1440 mL  Total IN: 2160 mL    OUT:  Total OUT: 0 mL    Total NET: 2160 mL        GENERAL: NAD, well-groomed, well-developed  HEAD:  Atraumatic, Normocephalic  EYES: EOMI, PERRLA, conjunctiva and sclera clear  ENMT: trach in place to vent  NECK: Supple, No JVD  NERVOUS SYSTEM:  Alert & Oriented X3, Motor Strength 3/5 B/L upper and lower extremities; DTRs 2+ intact and symmetric  CHEST/LUNG: Coarse breath sounds bilaterally  HEART: Regular rate and rhythm; No murmurs, rubs, or gallops  ABDOMEN: Soft, Nontender, Nondistended; Bowel sounds present  EXTREMITIES:  2+ Peripheral Pulses, No clubbing, cyanosis, or edema  LABS:                        9.3    6.93  )-----------( 311      ( 28 Dec 2022 12:32 )             29.3   CAPILLARY BLOOD GLUCOSE      POCT Blood Glucose.: 112 mg/dL (28 Dec 2022 11:31)  POCT Blood Glucose.: 98 mg/dL (28 Dec 2022 08:46)  POCT Blood Glucose.: 113 mg/dL (28 Dec 2022 05:54)  POCT Blood Glucose.: 150 mg/dL (27 Dec 2022 23:59)  POCT Blood Glucose.: 124 mg/dL (27 Dec 2022 20:59)  POCT Blood Glucose.: 156 mg/dL (27 Dec 2022 16:34)    RECENT CULTURES:   RADIOLOGY & ADDITIONAL TESTS:

## 2022-12-28 NOTE — PROGRESS NOTE ADULT - ASSESSMENT
Patient heart rhythm runs Sinus zee/ Sinus rhythm with PAC's and 1st degree Heart block. MD Aware. Will continue to monitor.      REVIEW OF SYMPTOMS      Able to give (reliable) ROS  NO     PHYSICAL EXAM    HEENT Unremarkable  atraumatic   RESP Fair air entry EXP prolonged    Harsh breath sound Resp distres mild   CARDIAC S1 S2 No S3     NO JVD    ABDOMEN SOFT BS PRESENT NOT DISTENDED No hepatosplenomegaly   PEDAL EDEMA present No calf tenderness  NO rash       GENERAL DATA .   GOC.  12/11/2022 full code       ALLGY.  nka                            WT. ..  12/2/2022 86  BMI. ..    12/2/2022 29                          ICU STAY.  .. 11/29-12/9  COVID.   .. 12/2/2022 scv2 (-)   .. 11/20/2022 scv2 (-)   BEST PRACTICE ISSUES.    HOB ELEVATN. Yes  DVT PPLX.   12/15/2022 lvnx 80.2 ( l sc thrombus)    ALEJO PPLX.   ..      INFN PPLX.   .. 11/25 chlorhex .12%   .. 11/14 chlorhex 2%   SP SW ANTWAN.         DIET.    ..  12/15 glucerna 1.2 1440 gt   IV fl.  ..            PROCEDURES.  .. 12/19 debridement of sacral wound   .. 12/5/2022 trach  .. 12/5/2022 peg   .. 12/12 r arm midline       ABGS.  12/3/2022 ac 14/450/.3/5 750/46/75      VS/ PO/IO/ VENT/ DRIPS.  12/28/2022 99f 92 110/50   12/28/2022cpap 5/.3     MAIN ISSUES.  72 m doa 11/14/2022 cac  PMH CAD s/p PCI with stent 2015 and CABG 2014,   PMH  s/p medtronic PPM,   PMH CVA (lacunar infarct)    1) PEA arrest with ROSC after approximately 5 min 11/14  Now communicative   2) DVT 12/12 l Subclav thromS 12/15/2022 lvnx 80.2  3) TRACH 12/5/2022 trach  4) PEG12/5/2022 peg   5) Cellulitis hand absc 12/13 mod enterococcus fecalis  staph epi 12/12-12/15/2022  cephalexin 12/15 vanco once  12/16-12/19 zosyn  6) Vent weaning       PROBLEM ASSESSMENT RECOMMENDATIONS..  Sp  cac 11/14/2022   .. Patient is interactive and answers questions appropriately as dw son on 12/13/2022   VTE.  .. 12/12/2022 Pt found to have l subclav dvt    12/12/2022 iv ufh startd   .. 12/15/2022 changed to lvnx   Trach 12/5   .. trach care   COPD.  .. 12/22/2022 symbicort 80   vent management.  .. vent bundle dsbt dsv target pplat 30 (-) PO2 60 (+) pH 730 (+)   weaming.  .. 12/13-12/14-12/16/2022 tolerated cpap 12/13-12/14-12/16/20 rsbi 78-94-71  .. 12/15/2022 tolerated cpap 4 h  .. 12/18/2022 dw rt Pt not tolerating cpap gets agitated after few min on 12/17    .. 12/22/2022 dw resp and with pt son bedside Pt has not been tolerating cpap   .. 12/24/2022 dw rt Try cpap 5 ps5 and if toerates will place on tc 12/25 12/25/2022 failed cpap 5/5/ on 12/24   Infection.  ..  Monitor for vap    cellulitis hand   .. w 12/24-12/27/2022 w 8.9- 8.5    .. absc 12/13 mod enterococcus fecalis  staph epi   .. sp course of abio   CAD.  .. 11/14 asa 81   .. 12/13 metoprolol 25.2   .. Monitor   CHF.   .. 11/14/2022 echo mod decr segmental lvsf apical lateral apiccal ant segment are abn takotsubo cmpthy pasp 42 .  .. monitor for chf   Dysphagia.  .. 12/24/2022 speech eval requestd   anemia.  .. Hb 12/16-12/17-12/18-12/19-12/20-12/23-12/-12/27/2022   Hb 8.9 - 9.5- 8.9  - 9.2 - 9.1 - 8.8 - 9.9 - 10   .. Monitor  Target Hb 7 (+)   DECUB.  .. 12/2 collagenase   SEIZURES.  .. sz meds as guided by neuro     TIME SPENT   Over 25 minutes aggregate care time spent on encounter; activities included   direct patient care, counseling and/or coordinating care reviewing notes, lab data/ imaging , discussion with multidisciplinary team/ patient  /family and explaining in detail risks, benefits, alternatives  of the recommendations     MARQUISE TAYLOR 72 m 11/14/2022 1948 Dr Leela Dowling

## 2022-12-28 NOTE — PROGRESS NOTE ADULT - ASSESSMENT
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 Unknown how long pt unresponsive for at home. Blood sugar in the 40s with EMS s/p glucagon. On arrival to ER pt hypothermic and agonally breathing. Pt became unresponsive with loss of pulse; ACLS initiated. PEA arrest with ROSC after approximately 5 min s/p Epi x2.  Pt intubated during CODE BLUE. Per son pt on the day prior to presentation reported low blood sugar reads on his glucometer in the range of 80s. Son believes pt continued to take his insulin and oral diabetic regimen. Son states that pt was eating but may have been eating less in the last few days. Later on pt noted to have tonic-clonic Sz.     PEA arrest  s/p Epi x 2. ROSC achieved  CAD/ CHF history:  Takotsubo cardiomyopathy found on Echo   stable  On Coreg, Entresto/ Lasix/ ASA, Plavix at home, now on asa, metoprolol   12/13--cardio eval appreciated, outpt follow up    Acute hypoxemic respiratory failure now chronic  s/p intubation , failed extubation, now trach to vent  c/w vent managment  pulm consulted  Continue weaning trial, Pt extubated overnight 2 nights ago, trach placed back in, possibly can switch to trach collar next wk  12/12 trach sutures removed by surgery   Albuterol and Symbicort added for H/O COPD, asthma      LUE VTE with cellulitis /infected left hand hematoma s/p bedside debridement 12/13   LUE edema and wound of left hand ; good radial pulse   --LUE venous duplex : occlusive thrombus in the left mid subclavian vein with partial   slow flow detected in the lateral subclavian vein.  --LUE arterial Duplex neg   --on lovenox   --vascular following   --12/13 Hand surgery consult appreciated, s/p bedside debridement.   --ID consult appreciated  --antibiotic course completed     Sacral Ulcer  -s/p debridement on 12/19    New onset Seizure likely due to anoxic brain injury  discussed with Dr. Loo, patient will be only on Keppra 1500mg bid   off vimpat and Depakote   monitor     ELMER 2 to ATN; resolved    Shock liver.  due to PEA arrest. resolved    DM2   A1c 8.8%  continue with current management   FS goal 140-180   continue with PEG feeds     Hypophosphatemia --repleted     unstageable sacral pressure wound   s/p debridement     Normocytic anemia   no e/o bleeding or bruising   H/H stable   low normal Vit B12 and folic acid --supplement       Preventative measures   heparin gtt --dvt ppx  fall, aspiration  precautions , remove mittens.   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 Unknown how long pt unresponsive for at home. Blood sugar in the 40s with EMS s/p glucagon. On arrival to ER pt hypothermic and agonally breathing. Pt became unresponsive with loss of pulse; ACLS initiated. PEA arrest with ROSC after approximately 5 min s/p Epi x2.  Pt intubated during CODE BLUE. Per son pt on the day prior to presentation reported low blood sugar reads on his glucometer in the range of 80s. Son believes pt continued to take his insulin and oral diabetic regimen. Son states that pt was eating but may have been eating less in the last few days. Later on pt noted to have tonic-clonic Sz.     Problem/Plan-1:  Problem: PEA arrest;s/p Epi x 2. ROSC achieved  CAD/ CHF history:  Takotsubo cardiomyopathy found on Echo   stable  On Coreg, Entresto/ Lasix/ ASA, Plavix at home, now on asa, metoprolol   12/13--cardio eval appreciated, outpt follow up  - will resume Entresto on d/c    Problem/Plan-2:  Problem: Acute hypoxemic respiratory failure now chronic  s/p intubation , failed extubation, now trach to vent  c/w vent managment  pulm consulted  Continue weaning trial, Pt extubated overnight 2 nights ago, trach placed back in, possibly can switch to trach collar next wk  12/12 trach sutures removed by surgery   Albuterol and Symbicort added for H/O COPD, asthma      Problem/Plan-3:  Problem: LUE VTE with cellulitis /infected left hand hematoma s/p bedside debridement 12/13   LUE edema and wound of left hand ; good radial pulse   --LUE venous duplex : occlusive thrombus in the left mid subclavian vein with partial   slow flow detected in the lateral subclavian vein.  --LUE arterial Duplex neg   --on lovenox   --vascular following   --12/13 Hand surgery consult appreciated, s/p bedside debridement.   --ID consult appreciated  --antibiotic course completed     Problem/Plan-4:  Problem: Sacral Ulcer  -s/p debridement on 12/19    Problem/Plan-5:  Problem: New onset Seizure likely due to anoxic brain injury  discussed with Dr. Loo, patient will be only on Keppra 1500mg bid   off vimpat and Depakote   monitor     Problem/Plan-6:  Problem: ELMER 2 to ATN  ; resolved    Problem/Plan-7:  Problem: Shock liver.    due to PEA arrest. resolved    Problem/Plan-8:  Problem:DM2   A1c 8.8%  continue with current management   FS goal 140-180   continue with PEG feeds     Problem/Plan-9:  Problem: Hypophosphatemia --repleted     Problem/Plan-10:  Problem: Ustageable sacral pressure wound   s/p debridement     Problem/Plan-11:  Problem:Normocytic anemia   no e/o bleeding or bruising   H/H stable   low normal Vit B12 and folic acid --supplement       Preventative measures   heparin gtt --dvt ppx  fall, aspiration  precautions , remove mittens.   HOBE     Dispo: patient is undocumented, will need PRUCOL

## 2022-12-29 LAB
GLUCOSE BLDC GLUCOMTR-MCNC: 120 MG/DL — HIGH (ref 70–99)
GLUCOSE BLDC GLUCOMTR-MCNC: 148 MG/DL — HIGH (ref 70–99)
GLUCOSE BLDC GLUCOMTR-MCNC: 151 MG/DL — HIGH (ref 70–99)
GLUCOSE BLDC GLUCOMTR-MCNC: 165 MG/DL — HIGH (ref 70–99)
GLUCOSE BLDC GLUCOMTR-MCNC: 90 MG/DL — SIGNIFICANT CHANGE UP (ref 70–99)

## 2022-12-29 PROCEDURE — 99232 SBSQ HOSP IP/OBS MODERATE 35: CPT

## 2022-12-29 RX ADMIN — Medication 1 APPLICATION(S): at 18:03

## 2022-12-29 RX ADMIN — INSULIN GLARGINE 20 UNIT(S): 100 INJECTION, SOLUTION SUBCUTANEOUS at 08:49

## 2022-12-29 RX ADMIN — Medication 1 APPLICATION(S): at 05:50

## 2022-12-29 RX ADMIN — BUDESONIDE AND FORMOTEROL FUMARATE DIHYDRATE 2 PUFF(S): 160; 4.5 AEROSOL RESPIRATORY (INHALATION) at 18:19

## 2022-12-29 RX ADMIN — Medication 1 MILLIGRAM(S): at 11:51

## 2022-12-29 RX ADMIN — Medication 1 APPLICATION(S): at 18:02

## 2022-12-29 RX ADMIN — ENOXAPARIN SODIUM 80 MILLIGRAM(S): 100 INJECTION SUBCUTANEOUS at 05:54

## 2022-12-29 RX ADMIN — Medication 1 APPLICATION(S): at 05:48

## 2022-12-29 RX ADMIN — LEVETIRACETAM 1500 MILLIGRAM(S): 250 TABLET, FILM COATED ORAL at 17:59

## 2022-12-29 RX ADMIN — Medication 2: at 11:50

## 2022-12-29 RX ADMIN — LEVETIRACETAM 1500 MILLIGRAM(S): 250 TABLET, FILM COATED ORAL at 06:01

## 2022-12-29 RX ADMIN — CHLORHEXIDINE GLUCONATE 1 APPLICATION(S): 213 SOLUTION TOPICAL at 05:49

## 2022-12-29 RX ADMIN — PREGABALIN 1000 MICROGRAM(S): 225 CAPSULE ORAL at 11:51

## 2022-12-29 RX ADMIN — Medication 81 MILLIGRAM(S): at 11:50

## 2022-12-29 RX ADMIN — CHLORHEXIDINE GLUCONATE 15 MILLILITER(S): 213 SOLUTION TOPICAL at 05:47

## 2022-12-29 RX ADMIN — Medication 1 APPLICATION(S): at 05:55

## 2022-12-29 RX ADMIN — POLYETHYLENE GLYCOL 3350 17 GRAM(S): 17 POWDER, FOR SOLUTION ORAL at 11:51

## 2022-12-29 RX ADMIN — Medication 2: at 05:45

## 2022-12-29 RX ADMIN — Medication 25 MILLIGRAM(S): at 05:48

## 2022-12-29 RX ADMIN — CHLORHEXIDINE GLUCONATE 15 MILLILITER(S): 213 SOLUTION TOPICAL at 18:01

## 2022-12-29 RX ADMIN — BUDESONIDE AND FORMOTEROL FUMARATE DIHYDRATE 2 PUFF(S): 160; 4.5 AEROSOL RESPIRATORY (INHALATION) at 05:25

## 2022-12-29 RX ADMIN — Medication 500 MILLIGRAM(S): at 18:01

## 2022-12-29 RX ADMIN — ENOXAPARIN SODIUM 80 MILLIGRAM(S): 100 INJECTION SUBCUTANEOUS at 18:03

## 2022-12-29 RX ADMIN — Medication 500 MILLIGRAM(S): at 05:47

## 2022-12-29 NOTE — PROGRESS NOTE ADULT - ASSESSMENT
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 Unknown how long pt unresponsive for at home. Blood sugar in the 40s with EMS s/p glucagon. On arrival to ER pt hypothermic and agonally breathing. Pt became unresponsive with loss of pulse; ACLS initiated. PEA arrest with ROSC after approximately 5 min s/p Epi x2.  Pt intubated during CODE BLUE. Per son pt on the day prior to presentation reported low blood sugar reads on his glucometer in the range of 80s. Son believes pt continued to take his insulin and oral diabetic regimen. Son states that pt was eating but may have been eating less in the last few days. Later on pt noted to have tonic-clonic Sz.     Problem/Plan-1:  Problem: PEA arrest;s/p Epi x 2. ROSC achieved  CAD/ CHF history:  Takotsubo cardiomyopathy found on Echo   stable  On Coreg, Entresto/ Lasix/ ASA, Plavix at home, now on asa, metoprolol   12/13--cardio eval appreciated, outpt follow up  - will resume Entresto on d/c    Problem/Plan-2:  Problem: Acute hypoxemic respiratory failure now chronic  s/p intubation , failed extubation, now trach to vent  c/w vent managment  pulm consulted  Continue weaning trial, Pt extubated overnight 2 nights ago, trach placed back in, possibly can switch to trach collar next wk  12/12 trach sutures removed by surgery   Albuterol and Symbicort added for H/O COPD, asthma      Problem/Plan-3:  Problem: LUE VTE with cellulitis /infected left hand hematoma s/p bedside debridement 12/13   LUE edema and wound of left hand ; good radial pulse   --LUE venous duplex : occlusive thrombus in the left mid subclavian vein with partial   slow flow detected in the lateral subclavian vein.  --LUE arterial Duplex neg   --on lovenox   --vascular following   --12/13 Hand surgery consult appreciated, s/p bedside debridement.   --ID consult appreciated  --antibiotic course completed     Problem/Plan-4:  Problem: Sacral Ulcer  -s/p debridement on 12/19    Problem/Plan-5:  Problem: New onset Seizure likely due to anoxic brain injury  discussed with Dr. Loo, patient will be only on Keppra 1500mg bid   off vimpat and Depakote   monitor     Problem/Plan-6:  Problem: ELMER 2 to ATN  ; resolved    Problem/Plan-7:  Problem: Shock liver.    due to PEA arrest. resolved    Problem/Plan-8:  Problem:DM2   A1c 8.8%  continue with current management   FS goal 140-180   continue with PEG feeds     Problem/Plan-9:  Problem: Hypophosphatemia --repleted     Problem/Plan-10:  Problem: Ustageable sacral pressure wound   s/p debridement     Problem/Plan-11:  Problem:Normocytic anemia   no e/o bleeding or bruising   H/H stable   low normal Vit B12 and folic acid --supplement       Preventative measures   heparin gtt --dvt ppx  fall, aspiration  precautions , remove mittens.   HOBE     Dispo: patient is undocumented, will need PRUCOL  paperwork completed by MD on 12/29/2022 and given to LUDMILA on 12/23/22   73 yo M with h/o COPD, CAD s/p PCI with stent 2015 and CABG 2014, chronic diastolic heart failure (EF 50%), 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 Unknown how long pt unresponsive for at home. Blood sugar in the 40s with EMS s/p glucagon. On arrival to ER pt hypothermic and agonally breathing. Pt became unresponsive with loss of pulse; ACLS initiated. PEA arrest with ROSC after approximately 5 min s/p Epi x2.  Pt intubated during CODE BLUE. Per son pt on the day prior to presentation reported low blood sugar reads on his glucometer in the range of 80s. Son believes pt continued to take his insulin and oral diabetic regimen. Son states that pt was eating but may have been eating less in the last few days. Later on pt noted to have tonic-clonic Sz.     Problem/Plan-1:  Problem: PEA arrest;s/p Epi x 2. ROSC achieved  CAD/ CHF history:  Takotsubo cardiomyopathy found on Echo   stable  On Coreg, Entresto/ Lasix/ ASA, Plavix at home, now on asa, metoprolol   12/13--cardio eval appreciated, outpt follow up  - will resume Entresto on d/c    Problem/Plan-2:  Problem: Acute hypoxemic respiratory failure now chronic  s/p intubation , failed extubation, now trach to vent  c/w vent managment  pulm consulted  Continue weaning trial, Pt extubated overnight 2 nights ago, trach placed back in, possibly can switch to trach collar next wk  12/12 trach sutures removed by surgery   Albuterol and Symbicort added for H/O COPD, asthma      Problem/Plan-3:  Problem: LUE VTE with cellulitis /infected left hand hematoma s/p bedside debridement 12/13   LUE edema and wound of left hand ; good radial pulse   --LUE venous duplex : occlusive thrombus in the left mid subclavian vein with partial   slow flow detected in the lateral subclavian vein.  --LUE arterial Duplex neg   --on lovenox   --vascular following   --12/13 Hand surgery consult appreciated, s/p bedside debridement.   --ID consult appreciated  --antibiotic course completed     Problem/Plan-4:  Problem: Sacral Ulcer  -s/p debridement on 12/19    Problem/Plan-5:  Problem: New onset Seizure likely due to anoxic brain injury  discussed with Dr. Loo, patient will be only on Keppra 1500mg bid   off vimpat and Depakote   monitor     Problem/Plan-6:  Problem: ELMER 2 to ATN  ; resolved    Problem/Plan-7:  Problem: Shock liver.    due to PEA arrest. resolved    Problem/Plan-8:  Problem:DM2   A1c 8.8%  continue with current management   FS goal 140-180   continue with PEG feeds     Problem/Plan-9:  Problem: Hypophosphatemia --repleted     Problem/Plan-10:  Problem: Ustageable sacral pressure wound   s/p debridement     Problem/Plan-11:  Problem:Normocytic anemia   no e/o bleeding or bruising   H/H stable   low normal Vit B12 and folic acid --supplement       Preventative measures   heparin gtt --dvt ppx  fall, aspiration  precautions , remove mittens.   HOBE     Dispo: patient is undocumented, will need PRUCOL  paperwork completed by MD on 12/29/2022 and will be given to SW on tomorrow

## 2022-12-29 NOTE — PROGRESS NOTE ADULT - SUBJECTIVE AND OBJECTIVE BOX
BRANDON CAMARILLO    [facility] [address]    Allergies    No Known Allergies    Intolerances        PAST MEDICAL & SURGICAL HISTORY:  HTN (hypertension)      HLD (hyperlipidemia)      Diabetes mellitus      Lacunar infarction      BPH (benign prostatic hyperplasia)      CHF (congestive heart failure)      Chronic obstructive pulmonary disease, unspecified COPD type      S/P CABG (coronary artery bypass graft)  2014          FAMILY HISTORY:      Home Medications:  aspirin 81 mg oral delayed release tablet: 1 tab(s) orally once a day (12 Jun 2020 17:35)  Liquifilm Tears preserved ophthalmic solution: 1 drop(s) to each affected eye 2 times a day (12 Jun 2020 17:35)      MEDICATIONS  (STANDING):  aspirin  chewable 81 milliGRAM(s) Oral daily  atorvastatin 20 milliGRAM(s) Oral at bedtime  bacitracin   Ointment 1 Application(s) Topical two times a day  budesonide  80 MICROgram(s)/formoterol 4.5 MICROgram(s) Inhaler 2 Puff(s) Inhalation two times a day  chlorhexidine 0.12% Liquid 15 milliLiter(s) Oral Mucosa every 12 hours  chlorhexidine 2% Cloths 1 Application(s) Topical <User Schedule>  collagenase Ointment 1 Application(s) Topical two times a day  cyanocobalamin 1000 MICROGram(s) Oral daily  enoxaparin Injectable 80 milliGRAM(s) SubCutaneous every 12 hours  folic acid 1 milliGRAM(s) Oral daily  insulin glargine Injectable (LANTUS) 20 Unit(s) SubCutaneous every morning  insulin lispro (ADMELOG) corrective regimen sliding scale   SubCutaneous every 6 hours  levETIRAcetam 1500 milliGRAM(s) Oral two times a day  metoprolol tartrate 25 milliGRAM(s) Oral two times a day  polyethylene glycol 3350 17 Gram(s) Oral daily  senna 2 Tablet(s) Oral at bedtime  silver sulfADIAZINE 1% Cream 1 Application(s) Topical two times a day  valproic  acid Syrup 500 milliGRAM(s) Enteral Tube every 8 hours    MEDICATIONS  (PRN):  acetaminophen     Tablet .. 650 milliGRAM(s) Oral every 6 hours PRN Temp greater or equal to 38C (100.4F), Mild Pain (1 - 3)  albuterol    90 MICROgram(s) HFA Inhaler 2 Puff(s) Inhalation every 6 hours PRN Shortness of Breath and/or Wheezing  aluminum hydroxide/magnesium hydroxide/simethicone Suspension 30 milliLiter(s) Oral every 4 hours PRN Dyspepsia      Diet, NPO with Tube Feed:   Tube Feeding Modality: Gastrostomy  Glucerna 1.2 Pedrito  Total Volume for 24 Hours (mL): 1440  Continuous  Until Goal Tube Feed Rate (mL per Hour): 60  Tube Feed Duration (in Hours): 24  Tube Feed Start Time: 12:15  Free Water Flush Instructions:  automatic water flus via pump @ 20 ml/hr (12-23-22 @ 12:08) [Active]          Vital Signs Last 24 Hrs  T(C): 36.8 (29 Dec 2022 04:48), Max: 36.9 (28 Dec 2022 10:45)  T(F): 98.3 (29 Dec 2022 04:48), Max: 98.5 (28 Dec 2022 10:45)  HR: 81 (29 Dec 2022 08:45) (56 - 86)  BP: 115/72 (29 Dec 2022 04:48) (108/62 - 120/71)  BP(mean): --  RR: 17 (28 Dec 2022 23:25) (17 - 20)  SpO2: 99% (29 Dec 2022 08:45) (98% - 100%)            Mode: AC/ CMV (Assist Control/ Continuous Mandatory Ventilation), RR (machine): 14, TV (machine): 450, FiO2: 30, PEEP: 5, ITime: 1, MAP: 8, PIP: 20      LABS:                        9.3    6.93  )-----------( 311      ( 28 Dec 2022 12:32 )             29.3     12-28    142  |  104  |  28<H>  ----------------------------<  98  4.0   |  35<H>  |  0.81    Ca    8.3<L>      28 Dec 2022 08:05                WBC:  WBC Count: 6.93 K/uL (12-28 @ 12:32)  WBC Count: 8.56 K/uL (12-27 @ 07:19)  WBC Count: 7.30 K/uL (12-26 @ 07:35)      MICROBIOLOGY:  RECENT CULTURES:                  Sodium:  Sodium, Serum: 142 mmol/L (12-28 @ 08:05)  Sodium, Serum: 139 mmol/L (12-27 @ 21:40)      0.81 mg/dL 12-28 @ 08:05  0.90 mg/dL 12-27 @ 21:40      Hemoglobin:  Hemoglobin: 9.3 g/dL (12-28 @ 12:32)  Hemoglobin: 10.0 g/dL (12-27 @ 07:19)  Hemoglobin: 9.6 g/dL (12-26 @ 07:35)      Platelets: Platelet Count - Automated: 311 K/uL (12-28 @ 12:32)  Platelet Count - Automated: 354 K/uL (12-27 @ 07:19)  Platelet Count - Automated: 335 K/uL (12-26 @ 07:35)              RADIOLOGY & ADDITIONAL STUDIES:      MICROBIOLOGY:  RECENT CULTURES:

## 2022-12-29 NOTE — PROGRESS NOTE ADULT - SUBJECTIVE AND OBJECTIVE BOX
Patient is a 72y old  Male who presents with a chief complaint of s/p cardiac arrest, hypoglycemia (29 Dec 2022 10:04)    HPI:  Mr. Hamilton is a 72 year old male with a PMH of lacunar infarct, CHF (EF ~40% per son), CAD s/p PCI with stent and CABGx3 (~2020), s/p PPM (medtronic), HTN, COPD, DMII on insulin and PO meds, BPH, and CKD (stage II?) presenting to ICU s/p cardiac arrest in ED. Per patient son he has recently been doing well and in his normal state of health, however, yesterday Mr. Hamilton started c/o low blood sugars on glucometer. Pt reportedly continued to take his home medications, including both oral antidiabetic agents and insulin (both long and short acting). Today his son found him minimally responsive hanging off his bed, checked his glucose and it read "low," so he called 911. In ED pt was noted to be hypothermic with glucose noted to be 70 just prior to PEA arrest. Pt was coded for ~5 minutes with 2 epi given with ROSC. Pt intubated at that time. is moving all extremities equally and with reactive, equal pupils. He remained hypothermic and is not requiring vasopressor support. Pending CT Scans. ROS otherwise negative per son besides hypoglycemia, with no known sick contacts. Pt accepted to ICU for further management / care.  (14 Nov 2022 01:33)    SUBJECTIVE & OBJECTIVE: Pt seen and examined at bedside.   PHYSICAL EXAM:  T(C): 37.1 (12-29-22 @ 16:42), Max: 37.1 (12-29-22 @ 16:42)  HR: 75 (12-29-22 @ 18:30) (56 - 82)  BP: 107/67 (12-29-22 @ 16:42) (107/67 - 116/69)  RR: 18 (12-29-22 @ 16:42) (14 - 18)  SpO2: 100% (12-29-22 @ 18:30) (98% - 100%) Device: BV, Mode: AC/ CMV (Assist Control/ Continuous Mandatory Ventilation), RR (machine): 14, RR (patient): 23, TV (machine): 450, TV (patient): 344, FiO2: 30, PEEP: 5, ITime: 1, MAP: 9, PIP: 19Daily     Daily I&O's Detail    GENERAL: NAD, well-groomed, well-developed  HEAD:  Atraumatic, Normocephalic  EYES: EOMI, PERRLA, conjunctiva and sclera clear  ENMT: Moist mucous membranes  NECK: Supple, No JVD, trach to vent  NERVOUS SYSTEM:  Alert, Motor Strength 3/5 B/L upper and lower extremities; DTRs 2+ intact and symmetric  CHEST/LUNG: Clear to auscultation bilaterally; No rales, rhonchi, wheezing, or rubs  HEART: Regular rate and rhythm; No murmurs, rubs, or gallops  ABDOMEN: Soft, Nontender, Nondistended; Bowel sounds present, peg in place  EXTREMITIES:  2+ Peripheral Pulses, No clubbing, cyanosis, or edema  LABS:                        9.3    6.93  )-----------( 311      ( 28 Dec 2022 12:32 )             29.3   CAPILLARY BLOOD GLUCOSE      POCT Blood Glucose.: 90 mg/dL (29 Dec 2022 17:36)  POCT Blood Glucose.: 151 mg/dL (29 Dec 2022 11:02)  POCT Blood Glucose.: 120 mg/dL (29 Dec 2022 08:25)  POCT Blood Glucose.: 165 mg/dL (29 Dec 2022 05:41)  POCT Blood Glucose.: 116 mg/dL (28 Dec 2022 23:24)  POCT Blood Glucose.: 119 mg/dL (28 Dec 2022 20:59)    RECENT CULTURES:   RADIOLOGY & ADDITIONAL TESTS:

## 2022-12-29 NOTE — PROGRESS NOTE ADULT - ASSESSMENT
REVIEW OF SYMPTOMS      Able to give (reliable) ROS  NO     PHYSICAL EXAM    HEENT Unremarkable  atraumatic   RESP Fair air entry EXP prolonged    Harsh breath sound Resp distres mild   CARDIAC S1 S2 No S3     NO JVD    ABDOMEN SOFT BS PRESENT NOT DISTENDED No hepatosplenomegaly   PEDAL EDEMA present No calf tenderness  NO rash       GENERAL DATA .   GOC.  12/11/2022 full code       ALLGY.  nka                            WT. ..  12/2/2022 86  BMI. ..    12/2/2022 29                          ICU STAY.  .. 11/29-12/9  COVID.   .. 12/2/2022 scv2 (-)   .. 11/20/2022 scv2 (-)   BEST PRACTICE ISSUES.    HOB ELEVATN. Yes  DVT PPLX.   12/15/2022 lvnx 80.2 ( l sc thrombus)    ALEJO PPLX.   ..      INFN PPLX.   .. 11/25 chlorhex .12%   .. 11/14 chlorhex 2%   SP SW ANTWAN.         DIET.    ..  12/15 glucerna 1.2 1440 gt   IV fl.  ..            PROCEDURES.  .. 12/19 debridement of sacral wound   .. 12/5/2022 trach  .. 12/5/2022 peg   .. 12/12 r arm midline       ABGS.  12/3/2022 ac 14/450/.3/5 750/46/75      VS/ PO/IO/ VENT/ DRIPS.  12/29/2022 afeb 78 100/60   12/29/2022 12p cpap cpap 5 30% f 29 vt 330 rsbi 80      MAIN ISSUES.  72 m doa 11/14/2022 cac  PMH CAD s/p PCI with stent 2015 and CABG 2014,   PMH  s/p medtronic PPM,   PMH CVA (lacunar infarct)    1) PEA arrest with ROSC after approximately 5 min 11/14  Now communicative   2) DVT 12/12 l Subclav thromS 12/15/2022 lvnx 80.2  3) TRACH 12/5/2022 trach  4) PEG12/5/2022 peg   5) Cellulitis hand absc 12/13 mod enterococcus fecalis  staph epi 12/12-12/15/2022  cephalexin 12/15 vanco once  12/16-12/19 zosyn  6) Vent weaning       PROBLEM ASSESSMENT RECOMMENDATIONS..  Sp  cac 11/14/2022   .. Patient is interactive and answers questions appropriately as dw son on 12/13/2022   VTE.  .. 12/12/2022 Pt found to have l subclav dvt    12/12/2022 iv ufh startd   .. 12/15/2022 changed to lvnx   Trach 12/5   .. trach care   COPD.  .. 12/22/2022 symbicort 80   vent management.  .. vent bundle dsbt dsv target pplat 30 (-) PO2 60 (+) pH 730 (+)   weaming.  .. 12/13-12/14-12/16/2022 tolerated cpap 12/13-12/14-12/16/20 rsbi 78-94-71  .. 12/15/2022 tolerated cpap 4 h  .. 12/18/2022 dw rt Pt not tolerating cpap gets agitated after few min on 12/17    .. 12/22/2022 dw resp and with pt son bedside Pt has not been tolerating cpap   .. 12/24/2022 dw rt Try cpap 5 ps5 and if toerates will place on tc 12/25 12/25/2022 failed cpap 5/5/ on 12/24   Infection.  ..  Monitor for vap    cellulitis hand   .. w 12/24-12/27/2022 w 8.9- 8.5    .. absc 12/13 mod enterococcus fecalis  staph epi   .. sp course of abio   CAD.  .. 11/14 asa 81   .. 12/13 metoprolol 25.2   .. Monitor   CHF.   .. 11/14/2022 echo mod decr segmental lvsf apical lateral apiccal ant segment are abn takotsubo cmpthy pasp 42 .  .. monitor for chf   Dysphagia.  .. 12/24/2022 speech eval requestd   anemia.  .. Hb 12/16-12/17-12/18-12/19-12/20-12/23-12/-12/27/2022   Hb 8.9 - 9.5- 8.9  - 9.2 - 9.1 - 8.8 - 9.9 - 10   .. Monitor  Target Hb 7 (+)   DECUB.  .. 12/2 collagenase   SEIZURES.  .. sz meds as guided by neuro     TIME SPENT   Over 25 minutes aggregate care time spent on encounter; activities included   direct patient care, counseling and/or coordinating care reviewing notes, lab data/ imaging , discussion with multidisciplinary team/ patient  /family and explaining in detail risks, benefits, alternatives  of the recommendations     MARQUISE TAYLOR 72 m 11/14/2022 1948 Dr Leela Dowling

## 2022-12-30 LAB
GLUCOSE BLDC GLUCOMTR-MCNC: 120 MG/DL — HIGH (ref 70–99)
GLUCOSE BLDC GLUCOMTR-MCNC: 143 MG/DL — HIGH (ref 70–99)
GLUCOSE BLDC GLUCOMTR-MCNC: 163 MG/DL — HIGH (ref 70–99)
GLUCOSE BLDC GLUCOMTR-MCNC: 166 MG/DL — HIGH (ref 70–99)

## 2022-12-30 PROCEDURE — 99232 SBSQ HOSP IP/OBS MODERATE 35: CPT

## 2022-12-30 RX ORDER — BUDESONIDE AND FORMOTEROL FUMARATE DIHYDRATE 160; 4.5 UG/1; UG/1
2 AEROSOL RESPIRATORY (INHALATION)
Refills: 0 | Status: DISCONTINUED | OUTPATIENT
Start: 2022-12-30 | End: 2023-03-18

## 2022-12-30 RX ADMIN — Medication 500 MILLIGRAM(S): at 00:06

## 2022-12-30 RX ADMIN — ENOXAPARIN SODIUM 80 MILLIGRAM(S): 100 INJECTION SUBCUTANEOUS at 05:50

## 2022-12-30 RX ADMIN — Medication 1 APPLICATION(S): at 18:47

## 2022-12-30 RX ADMIN — Medication 25 MILLIGRAM(S): at 05:48

## 2022-12-30 RX ADMIN — ENOXAPARIN SODIUM 80 MILLIGRAM(S): 100 INJECTION SUBCUTANEOUS at 18:46

## 2022-12-30 RX ADMIN — Medication 1 APPLICATION(S): at 05:51

## 2022-12-30 RX ADMIN — Medication 500 MILLIGRAM(S): at 13:13

## 2022-12-30 RX ADMIN — Medication 81 MILLIGRAM(S): at 12:07

## 2022-12-30 RX ADMIN — LEVETIRACETAM 1500 MILLIGRAM(S): 250 TABLET, FILM COATED ORAL at 05:48

## 2022-12-30 RX ADMIN — BUDESONIDE AND FORMOTEROL FUMARATE DIHYDRATE 2 PUFF(S): 160; 4.5 AEROSOL RESPIRATORY (INHALATION) at 06:05

## 2022-12-30 RX ADMIN — PREGABALIN 1000 MICROGRAM(S): 225 CAPSULE ORAL at 12:07

## 2022-12-30 RX ADMIN — Medication 1 APPLICATION(S): at 18:52

## 2022-12-30 RX ADMIN — LEVETIRACETAM 1500 MILLIGRAM(S): 250 TABLET, FILM COATED ORAL at 18:44

## 2022-12-30 RX ADMIN — Medication 1 MILLIGRAM(S): at 12:07

## 2022-12-30 RX ADMIN — CHLORHEXIDINE GLUCONATE 15 MILLILITER(S): 213 SOLUTION TOPICAL at 05:48

## 2022-12-30 RX ADMIN — Medication 500 MILLIGRAM(S): at 22:05

## 2022-12-30 RX ADMIN — SENNA PLUS 2 TABLET(S): 8.6 TABLET ORAL at 22:05

## 2022-12-30 RX ADMIN — CHLORHEXIDINE GLUCONATE 1 APPLICATION(S): 213 SOLUTION TOPICAL at 05:48

## 2022-12-30 RX ADMIN — CHLORHEXIDINE GLUCONATE 15 MILLILITER(S): 213 SOLUTION TOPICAL at 18:46

## 2022-12-30 RX ADMIN — INSULIN GLARGINE 20 UNIT(S): 100 INJECTION, SOLUTION SUBCUTANEOUS at 08:23

## 2022-12-30 RX ADMIN — BUDESONIDE AND FORMOTEROL FUMARATE DIHYDRATE 2 PUFF(S): 160; 4.5 AEROSOL RESPIRATORY (INHALATION) at 18:33

## 2022-12-30 RX ADMIN — ATORVASTATIN CALCIUM 20 MILLIGRAM(S): 80 TABLET, FILM COATED ORAL at 00:06

## 2022-12-30 RX ADMIN — ATORVASTATIN CALCIUM 20 MILLIGRAM(S): 80 TABLET, FILM COATED ORAL at 22:05

## 2022-12-30 RX ADMIN — Medication 500 MILLIGRAM(S): at 05:47

## 2022-12-30 RX ADMIN — SENNA PLUS 2 TABLET(S): 8.6 TABLET ORAL at 00:06

## 2022-12-30 RX ADMIN — Medication 2: at 18:50

## 2022-12-30 RX ADMIN — Medication 1 APPLICATION(S): at 05:50

## 2022-12-30 RX ADMIN — Medication 2: at 08:22

## 2022-12-30 NOTE — PROGRESS NOTE ADULT - SUBJECTIVE AND OBJECTIVE BOX
BRANDON CAMARILLO    [facility] [address]    Allergies    No Known Allergies    Intolerances        PAST MEDICAL & SURGICAL HISTORY:  HTN (hypertension)      HLD (hyperlipidemia)      Diabetes mellitus      Lacunar infarction      BPH (benign prostatic hyperplasia)      CHF (congestive heart failure)      Chronic obstructive pulmonary disease, unspecified COPD type      S/P CABG (coronary artery bypass graft)  2014          FAMILY HISTORY:      Home Medications:  aspirin 81 mg oral delayed release tablet: 1 tab(s) orally once a day (12 Jun 2020 17:35)  Liquifilm Tears preserved ophthalmic solution: 1 drop(s) to each affected eye 2 times a day (12 Jun 2020 17:35)      MEDICATIONS  (STANDING):  aspirin  chewable 81 milliGRAM(s) Oral daily  atorvastatin 20 milliGRAM(s) Oral at bedtime  bacitracin   Ointment 1 Application(s) Topical two times a day  budesonide  80 MICROgram(s)/formoterol 4.5 MICROgram(s) Inhaler 2 Puff(s) Inhalation two times a day  chlorhexidine 0.12% Liquid 15 milliLiter(s) Oral Mucosa every 12 hours  chlorhexidine 2% Cloths 1 Application(s) Topical <User Schedule>  collagenase Ointment 1 Application(s) Topical two times a day  cyanocobalamin 1000 MICROGram(s) Oral daily  enoxaparin Injectable 80 milliGRAM(s) SubCutaneous every 12 hours  folic acid 1 milliGRAM(s) Oral daily  insulin glargine Injectable (LANTUS) 20 Unit(s) SubCutaneous every morning  insulin lispro (ADMELOG) corrective regimen sliding scale   SubCutaneous every 6 hours  levETIRAcetam 1500 milliGRAM(s) Oral two times a day  metoprolol tartrate 25 milliGRAM(s) Oral two times a day  polyethylene glycol 3350 17 Gram(s) Oral daily  senna 2 Tablet(s) Oral at bedtime  silver sulfADIAZINE 1% Cream 1 Application(s) Topical two times a day  valproic  acid Syrup 500 milliGRAM(s) Enteral Tube every 8 hours    MEDICATIONS  (PRN):  acetaminophen     Tablet .. 650 milliGRAM(s) Oral every 6 hours PRN Temp greater or equal to 38C (100.4F), Mild Pain (1 - 3)  albuterol    90 MICROgram(s) HFA Inhaler 2 Puff(s) Inhalation every 6 hours PRN Shortness of Breath and/or Wheezing  aluminum hydroxide/magnesium hydroxide/simethicone Suspension 30 milliLiter(s) Oral every 4 hours PRN Dyspepsia      Diet, NPO with Tube Feed:   Tube Feeding Modality: Gastrostomy  Glucerna 1.2 Pedrito  Total Volume for 24 Hours (mL): 1440  Continuous  Until Goal Tube Feed Rate (mL per Hour): 60  Tube Feed Duration (in Hours): 24  Tube Feed Start Time: 12:15  Free Water Flush Instructions:  automatic water flus via pump @ 20 ml/hr (12-23-22 @ 12:08) [Active]          Vital Signs Last 24 Hrs  T(C): 37.3 (30 Dec 2022 04:50), Max: 37.4 (29 Dec 2022 23:22)  T(F): 99.1 (30 Dec 2022 04:50), Max: 99.3 (29 Dec 2022 23:22)  HR: 69 (30 Dec 2022 06:00) (65 - 83)  BP: 118/72 (30 Dec 2022 04:50) (98/64 - 118/72)  BP(mean): --  RR: 21 (29 Dec 2022 23:22) (14 - 21)  SpO2: 96% (30 Dec 2022 06:00) (96% - 100%)    Parameters below as of 29 Dec 2022 22:39  Patient On (Oxygen Delivery Method): ventilator            Mode: AC/ CMV (Assist Control/ Continuous Mandatory Ventilation), RR (machine): 14, TV (machine): 450, FiO2: 30, PEEP: 5, ITime: 0.7, MAP: 13, PIP: 31      LABS:                        9.3    6.93  )-----------( 311      ( 28 Dec 2022 12:32 )             29.3     12-28    142  |  104  |  28<H>  ----------------------------<  98  4.0   |  35<H>  |  0.81    Ca    8.3<L>      28 Dec 2022 08:05                WBC:  WBC Count: 6.93 K/uL (12-28 @ 12:32)  WBC Count: 8.56 K/uL (12-27 @ 07:19)  WBC Count: 7.30 K/uL (12-26 @ 07:35)      MICROBIOLOGY:  RECENT CULTURES:                  Sodium:  Sodium, Serum: 142 mmol/L (12-28 @ 08:05)  Sodium, Serum: 139 mmol/L (12-27 @ 21:40)      0.81 mg/dL 12-28 @ 08:05  0.90 mg/dL 12-27 @ 21:40      Hemoglobin:  Hemoglobin: 9.3 g/dL (12-28 @ 12:32)  Hemoglobin: 10.0 g/dL (12-27 @ 07:19)  Hemoglobin: 9.6 g/dL (12-26 @ 07:35)      Platelets: Platelet Count - Automated: 311 K/uL (12-28 @ 12:32)  Platelet Count - Automated: 354 K/uL (12-27 @ 07:19)  Platelet Count - Automated: 335 K/uL (12-26 @ 07:35)              RADIOLOGY & ADDITIONAL STUDIES:      MICROBIOLOGY:  RECENT CULTURES:

## 2022-12-30 NOTE — PROGRESS NOTE ADULT - ASSESSMENT
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 Unknown how long pt unresponsive for at home. Blood sugar in the 40s with EMS s/p glucagon. On arrival to ER pt hypothermic and agonally breathing. Pt became unresponsive with loss of pulse; ACLS initiated. PEA arrest with ROSC after approximately 5 min s/p Epi x2.  Pt intubated during CODE BLUE. Per son pt on the day prior to presentation reported low blood sugar reads on his glucometer in the range of 80s. Son believes pt continued to take his insulin and oral diabetic regimen. Son states that pt was eating but may have been eating less in the last few days. Later on pt noted to have tonic-clonic Sz.     Problem/Plan-1:  Problem: PEA arrest;s/p Epi x 2. ROSC achieved  CAD/ CHF history:  Takotsubo cardiomyopathy found on Echo   stable  On Coreg, Entresto/ Lasix/ ASA, Plavix at home, now on asa, metoprolol   12/13--cardio eval appreciated, outpt follow up  - will resume Entresto on d/c    Problem/Plan-2:  Problem: Acute hypoxemic respiratory failure now chronic  s/p intubation , failed extubation, now trach to vent  c/w vent managment  pulm consulted  Continue weaning trial, Pt extubated overnight 2 nights ago, trach placed back in, possibly can switch to trach collar next wk  12/12 trach sutures removed by surgery   Albuterol and Symbicort added for H/O COPD, asthma      Problem/Plan-3:  Problem: LUE VTE with cellulitis /infected left hand hematoma s/p bedside debridement 12/13   LUE edema and wound of left hand ; good radial pulse   --LUE venous duplex : occlusive thrombus in the left mid subclavian vein with partial   slow flow detected in the lateral subclavian vein.  --LUE arterial Duplex neg   --on lovenox   --vascular following   --12/13 Hand surgery consult appreciated, s/p bedside debridement.   --ID consult appreciated  --antibiotic course completed   - now giving warm and cold packs     Problem/Plan-4:  Problem: Sacral Ulcer  -s/p debridement on 12/19    Problem/Plan-5:  Problem: New onset Seizure likely due to anoxic brain injury  discussed with Dr. Loo, patient will be only on Keppra 1500mg bid   off vimpat and Depakote   monitor     Problem/Plan-6:  Problem: ELMER 2 to ATN  ; resolved    Problem/Plan-7:  Problem: Shock liver.    due to PEA arrest. resolved    Problem/Plan-8:  Problem:DM2   A1c 8.8%  continue with current management   FS goal 140-180   continue with PEG feeds     Problem/Plan-9:  Problem: Hypophosphatemia --repleted     Problem/Plan-10:  Problem: Ustageable sacral pressure wound   s/p debridement     Problem/Plan-11:  Problem:Normocytic anemia   no e/o bleeding or bruising   H/H stable   low normal Vit B12 and folic acid --supplement       Preventative measures   heparin gtt --dvt ppx  fall, aspiration  precautions , remove mittens.   HOBE     Dispo: patient is undocumented, will need PRUCOL  paperwork completed by MD on 12/29/2022 and given to SW on today 12/30/2022

## 2022-12-30 NOTE — PROGRESS NOTE ADULT - SUBJECTIVE AND OBJECTIVE BOX
Patient is a 72y old  Male who presents with a chief complaint of s/p cardiac arrest, hypoglycemia (31 Dec 2022 09:34)    HPI:  Mr. Hamilton is a 72 year old male with a PMH of lacunar infarct, CHF (EF ~40% per son), CAD s/p PCI with stent and CABGx3 (~2020), s/p PPM (medtronic), HTN, COPD, DMII on insulin and PO meds, BPH, and CKD (stage II?) presenting to ICU s/p cardiac arrest in ED. Per patient son he has recently been doing well and in his normal state of health, however, yesterday Mr. Hamilton started c/o low blood sugars on glucometer. Pt reportedly continued to take his home medications, including both oral antidiabetic agents and insulin (both long and short acting). Today his son found him minimally responsive hanging off his bed, checked his glucose and it read "low," so he called 911. In ED pt was noted to be hypothermic with glucose noted to be 70 just prior to PEA arrest. Pt was coded for ~5 minutes with 2 epi given with ROSC. Pt intubated at that time. is moving all extremities equally and with reactive, equal pupils. He remained hypothermic and is not requiring vasopressor support. Pending CT Scans. ROS otherwise negative per son besides hypoglycemia, with no known sick contacts. Pt accepted to ICU for further management / care.  (14 Nov 2022 01:33)    SUBJECTIVE & OBJECTIVE: Pt seen and examined at bedside.   PHYSICAL EXAM:  T(C): 37.2 (12-31-22 @ 04:45), Max: 37.3 (12-30-22 @ 17:30)  HR: 83 (12-31-22 @ 08:47) (54 - 83)  BP: 108/69 (12-31-22 @ 07:42) (100/66 - 111/69)  RR: 18 (12-31-22 @ 04:45) (18 - 18)  SpO2: 100% (12-31-22 @ 08:47) (98% - 100%) Device: [place], Mode: AC/ CMV (Assist Control/ Continuous Mandatory Ventilation), RR (machine): 14, RR (patient): 17, TV (machine): 450, TV (patient): 381, FiO2: 30, PEEP: 5, ITime: 0.7, MAP: 13, PIP: 28Daily     Daily I&O's Detail    30 Dec 2022 07:01  -  31 Dec 2022 07:00  --------------------------------------------------------  IN:  Total IN: 0 mL    OUT:    Stool (mL): 1 mL  Total OUT: 1 mL    Total NET: -1 mL        GENERAL: NAD, well-groomed,  HEAD:  Atraumatic, Normocephalic  EYES: EOMI, PERRLA, conjunctiva and sclera clear  ENMT: Moist mucous membranes  NECK: Supple, No JVD, on trach collar  NERVOUS SYSTEM:  Alert, Motor Strength 5/5 B/L upper and lower extremities; DTRs 2+ intact and symmetric  CHEST/LUNG: Clear to auscultation bilaterally; No rales, rhonchi, wheezing, or rubs  HEART: Regular rate and rhythm; No murmurs, rubs, or gallops  ABDOMEN: Soft, Nontender, Nondistended; Bowel sounds present, peg in place  EXTREMITIES:  2+ Peripheral Pulses, No clubbing, cyanosis, or edema  LABS:  CAPILLARY BLOOD GLUCOSE      POCT Blood Glucose.: 149 mg/dL (31 Dec 2022 09:12)  POCT Blood Glucose.: 179 mg/dL (31 Dec 2022 07:37)  POCT Blood Glucose.: 141 mg/dL (31 Dec 2022 00:11)  POCT Blood Glucose.: 143 mg/dL (30 Dec 2022 21:06)  POCT Blood Glucose.: 163 mg/dL (30 Dec 2022 18:49)  POCT Blood Glucose.: 120 mg/dL (30 Dec 2022 11:53)    RECENT CULTURES:   RADIOLOGY & ADDITIONAL TESTS:

## 2022-12-30 NOTE — PROGRESS NOTE ADULT - ASSESSMENT
REVIEW OF SYMPTOMS      Able to give (reliable) ROS  NO     PHYSICAL EXAM    HEENT Unremarkable  atraumatic   RESP Fair air entry EXP prolonged    Harsh breath sound Resp distres mild   CARDIAC S1 S2 No S3     NO JVD    ABDOMEN SOFT BS PRESENT NOT DISTENDED No hepatosplenomegaly   PEDAL EDEMA present No calf tenderness  NO rash       GENERAL DATA .   GOC.  12/11/2022 full code       ALLGY.  nka                            WT. ..  12/2/2022 86  BMI. ..    12/2/2022 29                          ICU STAY.  .. 11/29-12/9  COVID.   .. 12/2/2022 scv2 (-)   .. 11/20/2022 scv2 (-)   BEST PRACTICE ISSUES.    HOB ELEVATN. Yes  DVT PPLX.   12/15/2022 lvnx 80.2 ( l sc thrombus)    ALEJO PPLX.   ..      INFN PPLX.   .. 11/25 chlorhex .12%   .. 11/14 chlorhex 2%   SP SW ANTWAN.         DIET.    ..  12/15 glucerna 1.2 1440 gt   IV fl.  ..            PROCEDURES.  .. 12/19 debridement of sacral wound   .. 12/5/2022 trach  .. 12/5/2022 peg   .. 12/12 r arm midline       ABGS.  12/3/2022 ac 14/450/.3/5 750/46/75      VS/ PO/IO/ VENT/ DRIPS.  12/30/2022 99f 60 100/60   12/30/2022 12p cpap 5/10/    MAIN ISSUES.  72 m doa 11/14/2022 cac  PMH CAD s/p PCI with stent 2015 and CABG 2014,   PMH  s/p medtronic PPM,   PMH CVA (lacunar infarct)    1) PEA arrest with ROSC after approximately 5 min 11/14  Now communicative   2) DVT 12/12 l Subclav thromS 12/15/2022 lvnx 80.2  3) TRACH 12/5/2022 trach  4) PEG12/5/2022 peg   5) Cellulitis hand absc 12/13 mod enterococcus fecalis  staph epi 12/12-12/15/2022  cephalexin 12/15 vanco once  12/16-12/19 zosyn  6) Vent weaning       PROBLEM ASSESSMENT RECOMMENDATIONS..  Sp  cac 11/14/2022   .. Patient is interactive and answers questions appropriately as dw son on 12/13/2022   VTE.  .. 12/12/2022 Pt found to have l subclav dvt    12/12/2022 iv ufh startd   .. 12/15/2022 changed to lvnx   Trach 12/5   .. trach care   COPD.  .. 12/22/2022 symbicort 80   vent management.  .. vent bundle dsbt dsv target pplat 30 (-) PO2 60 (+) pH 730 (+)   weaming.  .. 12/13-12/14-12/16/2022 tolerated cpap 12/13-12/14-12/16/20 rsbi 78-94-71  .. 12/15/2022 tolerated cpap 4 h  .. 12/18/2022 dw rt Pt not tolerating cpap gets agitated after few min on 12/17    .. 12/22/2022 dw resp and with pt son bedside Pt has not been tolerating cpap   .. 12/24/2022 dw rt Try cpap 5 ps5 and if toerates will place on tc 12/25 12/25/2022 failed cpap 5/5/ on 12/24   Infection.  ..  Monitor for vap    cellulitis hand   .. w 12/24-12/27/2022 w 8.9- 8.5    .. absc 12/13 mod enterococcus fecalis  staph epi   .. sp course of abio   CAD.  .. 11/14 asa 81   .. 12/13 metoprolol 25.2   .. Monitor   CHF.   .. 11/14/2022 echo mod decr segmental lvsf apical lateral apiccal ant segment are abn takotsubo cmpthy pasp 42 .  .. monitor for chf   Dysphagia.  .. 12/24/2022 speech eval requestd   anemia.  .. Hb 12/16-12/17-12/18-12/19-12/20-12/23-12/-12/27/2022   Hb 8.9 - 9.5- 8.9  - 9.2 - 9.1 - 8.8 - 9.9 - 10   .. Monitor  Target Hb 7 (+)   DECUB.  .. 12/2 collagenase   SEIZURES.  .. sz meds as guided by neuro   NONVIOLENT NONSELF DESTRUCTIVE LEVEL 1  .. 12/20/2022   .. 12/13/2022     TIME SPENT   Over 25 minutes aggregate care time spent on encounter; activities included   direct patient care, counseling and/or coordinating care reviewing notes, lab data/ imaging , discussion with multidisciplinary team/ patient  /family and explaining in detail risks, benefits, alternatives  of the recommendations     MARQUISE TAYLOR 72 m 11/14/2022 1948 Dr Leela Dowling

## 2022-12-31 LAB
GLUCOSE BLDC GLUCOMTR-MCNC: 106 MG/DL — HIGH (ref 70–99)
GLUCOSE BLDC GLUCOMTR-MCNC: 120 MG/DL — HIGH (ref 70–99)
GLUCOSE BLDC GLUCOMTR-MCNC: 141 MG/DL — HIGH (ref 70–99)
GLUCOSE BLDC GLUCOMTR-MCNC: 149 MG/DL — HIGH (ref 70–99)
GLUCOSE BLDC GLUCOMTR-MCNC: 179 MG/DL — HIGH (ref 70–99)
GLUCOSE BLDC GLUCOMTR-MCNC: 73 MG/DL — SIGNIFICANT CHANGE UP (ref 70–99)

## 2022-12-31 PROCEDURE — 99232 SBSQ HOSP IP/OBS MODERATE 35: CPT

## 2022-12-31 RX ADMIN — Medication 1 APPLICATION(S): at 17:12

## 2022-12-31 RX ADMIN — CHLORHEXIDINE GLUCONATE 1 APPLICATION(S): 213 SOLUTION TOPICAL at 07:32

## 2022-12-31 RX ADMIN — SENNA PLUS 2 TABLET(S): 8.6 TABLET ORAL at 22:10

## 2022-12-31 RX ADMIN — LEVETIRACETAM 1500 MILLIGRAM(S): 250 TABLET, FILM COATED ORAL at 17:15

## 2022-12-31 RX ADMIN — Medication 500 MILLIGRAM(S): at 13:04

## 2022-12-31 RX ADMIN — Medication 1 APPLICATION(S): at 17:13

## 2022-12-31 RX ADMIN — Medication 2: at 07:39

## 2022-12-31 RX ADMIN — LEVETIRACETAM 1500 MILLIGRAM(S): 250 TABLET, FILM COATED ORAL at 07:33

## 2022-12-31 RX ADMIN — Medication 1 APPLICATION(S): at 07:35

## 2022-12-31 RX ADMIN — Medication 25 MILLIGRAM(S): at 17:16

## 2022-12-31 RX ADMIN — PREGABALIN 1000 MICROGRAM(S): 225 CAPSULE ORAL at 13:04

## 2022-12-31 RX ADMIN — ATORVASTATIN CALCIUM 20 MILLIGRAM(S): 80 TABLET, FILM COATED ORAL at 22:10

## 2022-12-31 RX ADMIN — Medication 500 MILLIGRAM(S): at 22:10

## 2022-12-31 RX ADMIN — BUDESONIDE AND FORMOTEROL FUMARATE DIHYDRATE 2 PUFF(S): 160; 4.5 AEROSOL RESPIRATORY (INHALATION) at 17:20

## 2022-12-31 RX ADMIN — Medication 1 APPLICATION(S): at 07:33

## 2022-12-31 RX ADMIN — Medication 1 MILLIGRAM(S): at 13:05

## 2022-12-31 RX ADMIN — Medication 1 APPLICATION(S): at 17:14

## 2022-12-31 RX ADMIN — ENOXAPARIN SODIUM 80 MILLIGRAM(S): 100 INJECTION SUBCUTANEOUS at 07:34

## 2022-12-31 RX ADMIN — POLYETHYLENE GLYCOL 3350 17 GRAM(S): 17 POWDER, FOR SOLUTION ORAL at 13:05

## 2022-12-31 RX ADMIN — INSULIN GLARGINE 20 UNIT(S): 100 INJECTION, SOLUTION SUBCUTANEOUS at 09:21

## 2022-12-31 RX ADMIN — CHLORHEXIDINE GLUCONATE 15 MILLILITER(S): 213 SOLUTION TOPICAL at 17:12

## 2022-12-31 RX ADMIN — BUDESONIDE AND FORMOTEROL FUMARATE DIHYDRATE 2 PUFF(S): 160; 4.5 AEROSOL RESPIRATORY (INHALATION) at 05:43

## 2022-12-31 RX ADMIN — ENOXAPARIN SODIUM 80 MILLIGRAM(S): 100 INJECTION SUBCUTANEOUS at 17:12

## 2022-12-31 RX ADMIN — CHLORHEXIDINE GLUCONATE 15 MILLILITER(S): 213 SOLUTION TOPICAL at 07:32

## 2022-12-31 RX ADMIN — Medication 500 MILLIGRAM(S): at 07:33

## 2022-12-31 RX ADMIN — Medication 81 MILLIGRAM(S): at 13:04

## 2022-12-31 NOTE — PROGRESS NOTE ADULT - ASSESSMENT
REVIEW OF SYMPTOMS      Able to give (reliable) ROS  NO     PHYSICAL EXAM    HEENT Unremarkable  atraumatic   RESP Fair air entry EXP prolonged    Harsh breath sound Resp distres mild   CARDIAC S1 S2 No S3     NO JVD    ABDOMEN SOFT BS PRESENT NOT DISTENDED No hepatosplenomegaly   PEDAL EDEMA present No calf tenderness  NO rash       GENERAL DATA .   GOC.  12/11/2022 full code       ALLGY.  nka                            WT. ..  12/2/2022 86  BMI. ..    12/2/2022 29                          ICU STAY.  .. 11/29-12/9  COVID.   .. 12/2/2022 scv2 (-)   .. 11/20/2022 scv2 (-)   BEST PRACTICE ISSUES.    HOB ELEVATN. Yes  DVT PPLX.   12/15/2022 lvnx 80.2 ( l sc thrombus)    ALEJO PPLX.   ..      INFN PPLX.   .. 11/25 chlorhex .12%   .. 11/14 chlorhex 2%   SP SW ANTWAN.         DIET.    ..  12/15 glucerna 1.2 1440 gt   IV fl.  ..            PROCEDURES.  .. 12/19 debridement of sacral wound   .. 12/5/2022 trach  .. 12/5/2022 peg   .. 12/12 r arm midline       ABGS.  12/3/2022 ac 14/450/.3/5 750/46/75      VS/ PO/IO/ VENT/ DRIPS.  12/31/2022 70 120/70   12/31/2022 ac 14/450/5/.3     MAIN ISSUES.  72 m doa 11/14/2022 cac  PMH CAD s/p PCI with stent 2015 and CABG 2014,   PMH  s/p medtronic PPM,   PMH CVA (lacunar infarct)    1) PEA arrest with ROSC after approximately 5 min 11/14  Now communicative   2) DVT 12/12 l Subclav thromS 12/15/2022 lvnx 80.2  3) TRACH 12/5/2022 trach  4) PEG12/5/2022 peg   5) Cellulitis hand absc 12/13 mod enterococcus fecalis  staph epi 12/12-12/15/2022  cephalexin 12/15 vanco once  12/16-12/19 zosyn  6) Vent weaning       PROBLEM ASSESSMENT RECOMMENDATIONS..  Sp  cac 11/14/2022   .. Patient is interactive and answers questions appropriately as dw son on 12/13/2022   VTE.  .. 12/12/2022 Pt found to have l subclav dvt    12/12/2022 iv ufh startd   .. 12/15/2022 changed to lvnx   Trach 12/5   .. trach care   COPD.  .. 12/22/2022 symbicort 80 DCed  .. 12/30 symbicort 160   vent management.  .. vent bundle dsbt dsv target pplat 30 (-) PO2 60 (+) pH 730 (+)   weaming.  .. 12/13-12/14-12/16/2022 tolerated cpap 12/13-12/14-12/16/20 rsbi 78-94-71  .. 12/15/2022 tolerated cpap 4 h  .. 12/18/2022 dw rt Pt not tolerating cpap gets agitated after few min on 12/17    .. 12/22/2022 dw resp and with pt son bedside Pt has not been tolerating cpap   .. 12/24/2022 dw rt Try cpap 5 ps5 and if toerates will place on tc 12/25 12/25/2022 failed cpap 5/5/ on 12/24   Infection.  ..  Monitor for vap    cellulitis hand   .. w 12/24-12/27/2022 w 8.9- 8.5    .. absc 12/13 mod enterococcus fecalis  staph epi   .. sp course of abio   CAD.  .. 11/14 asa 81   .. 12/13 metoprolol 25.2   .. Monitor   CHF.   .. 11/14/2022 echo mod decr segmental lvsf apical lateral apiccal ant segment are abn takotsubo cmpthy pasp 42 .  .. monitor for chf   Dysphagia.  .. 12/24/2022 speech eval requestd   anemia.  .. Hb 12/16-12/17-12/18-12/19-12/20-12/23-12/-12/27/2022   Hb 8.9 - 9.5- 8.9  - 9.2 - 9.1 - 8.8 - 9.9 - 10   .. Monitor  Target Hb 7 (+)   DECUB.  .. 12/2 collagenase   SEIZURES.  .. sz meds as guided by neuro   FAMILY DISCUSSION.  .. 12/31/2022 dw son bedside   NONVIOLENT NONSELF DESTRUCTIVE LEVEL 1  .. 12/20/2022   .. 12/13/2022     TIME SPENT   Over 25 minutes aggregate care time spent on encounter; activities included   direct patient care, counseling and/or coordinating care reviewing notes, lab data/ imaging , discussion with multidisciplinary team/ patient  /family and explaining in detail risks, benefits, alternatives  of the recommendations     MARQUISE TAYLOR 72 m 11/14/2022 1948 Dr Leela Dowling

## 2022-12-31 NOTE — PROGRESS NOTE ADULT - SUBJECTIVE AND OBJECTIVE BOX
Patient is a 72y old  Male who presents with a chief complaint of s/p cardiac arrest, hypoglycemia (31 Dec 2022 09:26)    HPI:  Mr. Hamilton is a 72 year old male with a PMH of lacunar infarct, CHF (EF ~40% per son), CAD s/p PCI with stent and CABGx3 (~2020), s/p PPM (medtronic), HTN, COPD, DMII on insulin and PO meds, BPH, and CKD (stage II?) presenting to ICU s/p cardiac arrest in ED. Per patient son he has recently been doing well and in his normal state of health, however, yesterday Mr. Hamilton started c/o low blood sugars on glucometer. Pt reportedly continued to take his home medications, including both oral antidiabetic agents and insulin (both long and short acting). Today his son found him minimally responsive hanging off his bed, checked his glucose and it read "low," so he called 911. In ED pt was noted to be hypothermic with glucose noted to be 70 just prior to PEA arrest. Pt was coded for ~5 minutes with 2 epi given with ROSC. Pt intubated at that time. is moving all extremities equally and with reactive, equal pupils. He remained hypothermic and is not requiring vasopressor support. Pending CT Scans. ROS otherwise negative per son besides hypoglycemia, with no known sick contacts. Pt accepted to ICU for further management / care.  (14 Nov 2022 01:33)    SUBJECTIVE & OBJECTIVE: Pt seen and examined at bedside.   PHYSICAL EXAM:  T(C): 37.2 (12-31-22 @ 04:45), Max: 37.3 (12-30-22 @ 17:30)  HR: 83 (12-31-22 @ 08:47) (54 - 83)  BP: 108/69 (12-31-22 @ 07:42) (100/66 - 111/69)  RR: 18 (12-31-22 @ 04:45) (18 - 18)  SpO2: 100% (12-31-22 @ 08:47) (98% - 100%) Device: [place], Mode: AC/ CMV (Assist Control/ Continuous Mandatory Ventilation), RR (machine): 14, RR (patient): 17, TV (machine): 450, TV (patient): 381, FiO2: 30, PEEP: 5, ITime: 0.7, MAP: 13, PIP: 28Daily     Daily I&O's Detail    30 Dec 2022 07:01  -  31 Dec 2022 07:00  --------------------------------------------------------  IN:  Total IN: 0 mL    OUT:    Stool (mL): 1 mL  Total OUT: 1 mL    Total NET: -1 mL        GENERAL: NAD, well-groomed, well-developed  HEAD:  Atraumatic, Normocephalic  EYES: EOMI, PERRLA, conjunctiva and sclera clear  ENMT: Moist mucous membranes, on trach collar  NECK: Supple, No JVD  NERVOUS SYSTEM:  Alert, Motor Strength 5/5 B/L upper and lower extremities; DTRs 2+ intact and symmetric  CHEST/LUNG: Clear to auscultation bilaterally; No rales, rhonchi, wheezing, or rubs  HEART: Regular rate and rhythm; No murmurs, rubs, or gallops  ABDOMEN: Soft, Nontender, Nondistended; Bowel sounds present, peg in place and draining  EXTREMITIES:  2+ Peripheral Pulses, No clubbing, cyanosis, or edema  LABS:  CAPILLARY BLOOD GLUCOSE      POCT Blood Glucose.: 149 mg/dL (31 Dec 2022 09:12)  POCT Blood Glucose.: 179 mg/dL (31 Dec 2022 07:37)  POCT Blood Glucose.: 141 mg/dL (31 Dec 2022 00:11)  POCT Blood Glucose.: 143 mg/dL (30 Dec 2022 21:06)  POCT Blood Glucose.: 163 mg/dL (30 Dec 2022 18:49)  POCT Blood Glucose.: 120 mg/dL (30 Dec 2022 11:53)    RECENT CULTURES:   RADIOLOGY & ADDITIONAL TESTS:

## 2022-12-31 NOTE — PROGRESS NOTE ADULT - ASSESSMENT
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 Unknown how long pt unresponsive for at home. Blood sugar in the 40s with EMS s/p glucagon. On arrival to ER pt hypothermic and agonally breathing. Pt became unresponsive with loss of pulse; ACLS initiated. PEA arrest with ROSC after approximately 5 min s/p Epi x2.  Pt intubated during CODE BLUE. Per son pt on the day prior to presentation reported low blood sugar reads on his glucometer in the range of 80s. Son believes pt continued to take his insulin and oral diabetic regimen. Son states that pt was eating but may have been eating less in the last few days. Later on pt noted to have tonic-clonic Sz.     Problem/Plan-1:  Problem: PEA arrest;s/p Epi x 2. ROSC achieved  CAD/ CHF history:  Takotsubo cardiomyopathy found on Echo   stable  On Coreg, Entresto/ Lasix/ ASA, Plavix at home, now on asa, metoprolol   12/13--cardio eval appreciated, outpt follow up  - will resume Entresto on d/c    Problem/Plan-2:  Problem: Acute hypoxemic respiratory failure now chronic  s/p intubation , failed extubation, now trach to vent  c/w vent managment  pulm consulted  Continue weaning trial, Pt extubated overnight 2 nights ago, trach placed back in, possibly can switch to trach collar next wk  12/12 trach sutures removed by surgery   Albuterol and Symbicort added for H/O COPD, asthma      Problem/Plan-3:  Problem: LUE VTE with cellulitis /infected left hand hematoma s/p bedside debridement 12/13   LUE edema and wound of left hand ; good radial pulse   --LUE venous duplex : occlusive thrombus in the left mid subclavian vein with partial   slow flow detected in the lateral subclavian vein.  --LUE arterial Duplex neg   --on lovenox   --vascular following   --12/13 Hand surgery consult appreciated, s/p bedside debridement.   --ID consult appreciated  --antibiotic course completed   - now giving warm and cold packs     Problem/Plan-4:  Problem: Sacral Ulcer  -s/p debridement on 12/19    Problem/Plan-5:  Problem: New onset Seizure likely due to anoxic brain injury  discussed with Dr. Loo, patient will be only on Keppra 1500mg bid   off vimpat and Depakote   monitor     Problem/Plan-6:  Problem: ELMER 2 to ATN  ; resolved    Problem/Plan-7:  Problem: Shock liver.    due to PEA arrest. resolved    Problem/Plan-8:  Problem:DM2   A1c 8.8%  continue with current management   FS goal 140-180   continue with PEG feeds     Problem/Plan-9:  Problem: Hypophosphatemia --repleted     Problem/Plan-10:  Problem: Ustageable sacral pressure wound   s/p debridement     Problem/Plan-11:  Problem:Normocytic anemia   no e/o bleeding or bruising   H/H stable   low normal Vit B12 and folic acid --supplement       Preventative measures   heparin gtt --dvt ppx  fall, aspiration  precautions , remove mittens.   HOBE     Dispo: patient is undocumented, will need PRUCOL  paperwork completed by MD on 12/29/2022 and given to LUDMILA on 12/23/22p 71 yo M with h/o COPD, CAD s/p PCI with stent 2015 and CABG 2014, chronic diastolic heart failure (EF 50%), 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 Unknown how long pt unresponsive for at home. Blood sugar in the 40s with EMS s/p glucagon. On arrival to ER pt hypothermic and agonally breathing. Pt became unresponsive with loss of pulse; ACLS initiated. PEA arrest with ROSC after approximately 5 min s/p Epi x2.  Pt intubated during CODE BLUE. Per son pt on the day prior to presentation reported low blood sugar reads on his glucometer in the range of 80s. Son believes pt continued to take his insulin and oral diabetic regimen. Son states that pt was eating but may have been eating less in the last few days. Later on pt noted to have tonic-clonic Sz.     Problem/Plan-1:  Problem: PEA arrest;s/p Epi x 2. ROSC achieved  CAD/ CHF history:  Takotsubo cardiomyopathy found on Echo   stable  On Coreg, Entresto/ Lasix/ ASA, Plavix at home, now on asa, metoprolol   12/13--cardio eval appreciated, outpt follow up  - will resume Entresto on d/c    Problem/Plan-2:  Problem: Acute hypoxemic respiratory failure now chronic  s/p intubation , failed extubation, now trach to vent  c/w vent managment  pulm consulted  Continue weaning trial, Pt extubated overnight 2 nights ago, trach placed back in, possibly can switch to trach collar next wk  12/12 trach sutures removed by surgery   Albuterol and Symbicort added for H/O COPD, asthma      Problem/Plan-3:  Problem: LUE VTE with cellulitis /infected left hand hematoma s/p bedside debridement 12/13   LUE edema and wound of left hand ; good radial pulse   --LUE venous duplex : occlusive thrombus in the left mid subclavian vein with partial   slow flow detected in the lateral subclavian vein.  --LUE arterial Duplex neg   --on lovenox   --vascular following   --12/13 Hand surgery consult appreciated, s/p bedside debridement.   --ID consult appreciated  --antibiotic course completed   - now giving warm and cold packs     Problem/Plan-4:  Problem: Sacral Ulcer  -s/p debridement on 12/19    Problem/Plan-5:  Problem: New onset Seizure likely due to anoxic brain injury  discussed with Dr. Loo, patient will be only on Keppra 1500mg bid   off vimpat and Depakote   monitor     Problem/Plan-6:  Problem: ELMER 2 to ATN  ; resolved    Problem/Plan-7:  Problem: Shock liver.    due to PEA arrest. resolved    Problem/Plan-8:  Problem:DM2   A1c 8.8%  continue with current management   FS goal 140-180   continue with PEG feeds     Problem/Plan-9:  Problem: Hypophosphatemia --repleted     Problem/Plan-10:  Problem: Ustageable sacral pressure wound   s/p debridement     Problem/Plan-11:  Problem:Normocytic anemia   no e/o bleeding or bruising   H/H stable   low normal Vit B12 and folic acid --supplement       Preventative measures   heparin gtt --dvt ppx  fall, aspiration  precautions , remove mittens.   HOBE     Dispo: patient is undocumented, will need PRUCOL  paperwork completed by MD on 12/29/2022 and given to LUDMILA on 12/30/2022

## 2022-12-31 NOTE — PROGRESS NOTE ADULT - SUBJECTIVE AND OBJECTIVE BOX
BRANDON CAMARILLO    [address] [address]    Allergies    No Known Allergies    Intolerances        PAST MEDICAL & SURGICAL HISTORY:  HTN (hypertension)      HLD (hyperlipidemia)      Diabetes mellitus      Lacunar infarction      BPH (benign prostatic hyperplasia)      CHF (congestive heart failure)      Chronic obstructive pulmonary disease, unspecified COPD type      S/P CABG (coronary artery bypass graft)  2014          FAMILY HISTORY:      Home Medications:  aspirin 81 mg oral delayed release tablet: 1 tab(s) orally once a day (12 Jun 2020 17:35)  Liquifilm Tears preserved ophthalmic solution: 1 drop(s) to each affected eye 2 times a day (12 Jun 2020 17:35)      MEDICATIONS  (STANDING):  aspirin  chewable 81 milliGRAM(s) Oral daily  atorvastatin 20 milliGRAM(s) Oral at bedtime  bacitracin   Ointment 1 Application(s) Topical two times a day  budesonide 160 MICROgram(s)/formoterol 4.5 MICROgram(s) Inhaler 2 Puff(s) Inhalation two times a day  chlorhexidine 0.12% Liquid 15 milliLiter(s) Oral Mucosa every 12 hours  chlorhexidine 2% Cloths 1 Application(s) Topical <User Schedule>  collagenase Ointment 1 Application(s) Topical two times a day  cyanocobalamin 1000 MICROGram(s) Oral daily  enoxaparin Injectable 80 milliGRAM(s) SubCutaneous every 12 hours  folic acid 1 milliGRAM(s) Oral daily  insulin glargine Injectable (LANTUS) 20 Unit(s) SubCutaneous every morning  insulin lispro (ADMELOG) corrective regimen sliding scale   SubCutaneous every 6 hours  levETIRAcetam 1500 milliGRAM(s) Oral two times a day  metoprolol tartrate 25 milliGRAM(s) Oral two times a day  polyethylene glycol 3350 17 Gram(s) Oral daily  senna 2 Tablet(s) Oral at bedtime  silver sulfADIAZINE 1% Cream 1 Application(s) Topical two times a day  valproic  acid Syrup 500 milliGRAM(s) Enteral Tube every 8 hours    MEDICATIONS  (PRN):  acetaminophen     Tablet .. 650 milliGRAM(s) Oral every 6 hours PRN Temp greater or equal to 38C (100.4F), Mild Pain (1 - 3)  albuterol    90 MICROgram(s) HFA Inhaler 2 Puff(s) Inhalation every 6 hours PRN Shortness of Breath and/or Wheezing  aluminum hydroxide/magnesium hydroxide/simethicone Suspension 30 milliLiter(s) Oral every 4 hours PRN Dyspepsia      Diet, NPO with Tube Feed:   Tube Feeding Modality: Gastrostomy  Glucerna 1.2 Pedrito  Total Volume for 24 Hours (mL): 1440  Continuous  Until Goal Tube Feed Rate (mL per Hour): 60  Tube Feed Duration (in Hours): 24  Tube Feed Start Time: 12:15  Free Water Flush Instructions:  automatic water flus via pump @ 20 ml/hr (12-23-22 @ 12:08) [Active]          Vital Signs Last 24 Hrs  T(C): 37.2 (31 Dec 2022 04:45), Max: 37.3 (30 Dec 2022 17:30)  T(F): 98.9 (31 Dec 2022 04:45), Max: 99.2 (30 Dec 2022 17:30)  HR: 83 (31 Dec 2022 08:47) (54 - 83)  BP: 108/69 (31 Dec 2022 07:42) (100/66 - 111/69)  BP(mean): --  RR: 18 (31 Dec 2022 04:45) (18 - 18)  SpO2: 100% (31 Dec 2022 08:47) (98% - 100%)    Parameters below as of 31 Dec 2022 05:54  Patient On (Oxygen Delivery Method): ventilator          12-30-22 @ 07:01  -  12-31-22 @ 07:00  --------------------------------------------------------  IN: 0 mL / OUT: 1 mL / NET: -1 mL        Mode: AC/ CMV (Assist Control/ Continuous Mandatory Ventilation), RR (machine): 14, TV (machine): 450, FiO2: 30, PEEP: 5, ITime: 0.7, MAP: 13, PIP: 28      LABS:                    WBC:  WBC Count: 6.93 K/uL (12-28 @ 12:32)      MICROBIOLOGY:  RECENT CULTURES:                  Sodium:  Sodium, Serum: 142 mmol/L (12-28 @ 08:05)  Sodium, Serum: 139 mmol/L (12-27 @ 21:40)      0.81 mg/dL 12-28 @ 08:05  0.90 mg/dL 12-27 @ 21:40      Hemoglobin:  Hemoglobin: 9.3 g/dL (12-28 @ 12:32)      Platelets: Platelet Count - Automated: 311 K/uL (12-28 @ 12:32)              RADIOLOGY & ADDITIONAL STUDIES:      MICROBIOLOGY:  RECENT CULTURES:

## 2023-01-01 LAB
ANION GAP SERPL CALC-SCNC: 4 MMOL/L — LOW (ref 5–17)
BUN SERPL-MCNC: 27 MG/DL — HIGH (ref 7–23)
CALCIUM SERPL-MCNC: 8.6 MG/DL — SIGNIFICANT CHANGE UP (ref 8.5–10.1)
CHLORIDE SERPL-SCNC: 104 MMOL/L — SIGNIFICANT CHANGE UP (ref 96–108)
CO2 SERPL-SCNC: 31 MMOL/L — SIGNIFICANT CHANGE UP (ref 22–31)
CREAT SERPL-MCNC: 0.81 MG/DL — SIGNIFICANT CHANGE UP (ref 0.5–1.3)
EGFR: 93 ML/MIN/1.73M2 — SIGNIFICANT CHANGE UP
GLUCOSE BLDC GLUCOMTR-MCNC: 150 MG/DL — HIGH (ref 70–99)
GLUCOSE BLDC GLUCOMTR-MCNC: 163 MG/DL — HIGH (ref 70–99)
GLUCOSE BLDC GLUCOMTR-MCNC: 167 MG/DL — HIGH (ref 70–99)
GLUCOSE SERPL-MCNC: 102 MG/DL — HIGH (ref 70–99)
HCT VFR BLD CALC: 33.3 % — LOW (ref 39–50)
HGB BLD-MCNC: 9.9 G/DL — LOW (ref 13–17)
MCHC RBC-ENTMCNC: 29.7 G/DL — LOW (ref 32–36)
MCHC RBC-ENTMCNC: 29.8 PG — SIGNIFICANT CHANGE UP (ref 27–34)
MCV RBC AUTO: 100.3 FL — HIGH (ref 80–100)
NRBC # BLD: 0 /100 WBCS — SIGNIFICANT CHANGE UP (ref 0–0)
PLATELET # BLD AUTO: 168 K/UL — SIGNIFICANT CHANGE UP (ref 150–400)
POTASSIUM SERPL-MCNC: 4.2 MMOL/L — SIGNIFICANT CHANGE UP (ref 3.5–5.3)
POTASSIUM SERPL-SCNC: 4.2 MMOL/L — SIGNIFICANT CHANGE UP (ref 3.5–5.3)
RBC # BLD: 3.32 M/UL — LOW (ref 4.2–5.8)
RBC # FLD: 14.9 % — HIGH (ref 10.3–14.5)
SODIUM SERPL-SCNC: 139 MMOL/L — SIGNIFICANT CHANGE UP (ref 135–145)
WBC # BLD: 6.06 K/UL — SIGNIFICANT CHANGE UP (ref 3.8–10.5)
WBC # FLD AUTO: 6.06 K/UL — SIGNIFICANT CHANGE UP (ref 3.8–10.5)

## 2023-01-01 PROCEDURE — 99232 SBSQ HOSP IP/OBS MODERATE 35: CPT

## 2023-01-01 RX ADMIN — INSULIN GLARGINE 20 UNIT(S): 100 INJECTION, SOLUTION SUBCUTANEOUS at 08:31

## 2023-01-01 RX ADMIN — POLYETHYLENE GLYCOL 3350 17 GRAM(S): 17 POWDER, FOR SOLUTION ORAL at 12:08

## 2023-01-01 RX ADMIN — CHLORHEXIDINE GLUCONATE 15 MILLILITER(S): 213 SOLUTION TOPICAL at 05:24

## 2023-01-01 RX ADMIN — ATORVASTATIN CALCIUM 20 MILLIGRAM(S): 80 TABLET, FILM COATED ORAL at 21:49

## 2023-01-01 RX ADMIN — CHLORHEXIDINE GLUCONATE 1 APPLICATION(S): 213 SOLUTION TOPICAL at 12:07

## 2023-01-01 RX ADMIN — Medication 1 APPLICATION(S): at 05:24

## 2023-01-01 RX ADMIN — Medication 81 MILLIGRAM(S): at 12:05

## 2023-01-01 RX ADMIN — CHLORHEXIDINE GLUCONATE 15 MILLILITER(S): 213 SOLUTION TOPICAL at 17:28

## 2023-01-01 RX ADMIN — Medication 25 MILLIGRAM(S): at 17:28

## 2023-01-01 RX ADMIN — Medication 1 APPLICATION(S): at 05:27

## 2023-01-01 RX ADMIN — Medication 650 MILLIGRAM(S): at 23:50

## 2023-01-01 RX ADMIN — ENOXAPARIN SODIUM 80 MILLIGRAM(S): 100 INJECTION SUBCUTANEOUS at 05:25

## 2023-01-01 RX ADMIN — SENNA PLUS 2 TABLET(S): 8.6 TABLET ORAL at 21:49

## 2023-01-01 RX ADMIN — Medication 2: at 17:29

## 2023-01-01 RX ADMIN — BUDESONIDE AND FORMOTEROL FUMARATE DIHYDRATE 2 PUFF(S): 160; 4.5 AEROSOL RESPIRATORY (INHALATION) at 05:12

## 2023-01-01 RX ADMIN — Medication 1 MILLIGRAM(S): at 12:05

## 2023-01-01 RX ADMIN — Medication 500 MILLIGRAM(S): at 21:49

## 2023-01-01 RX ADMIN — BUDESONIDE AND FORMOTEROL FUMARATE DIHYDRATE 2 PUFF(S): 160; 4.5 AEROSOL RESPIRATORY (INHALATION) at 17:19

## 2023-01-01 RX ADMIN — Medication 2: at 12:08

## 2023-01-01 RX ADMIN — Medication 1 APPLICATION(S): at 17:34

## 2023-01-01 RX ADMIN — LEVETIRACETAM 1500 MILLIGRAM(S): 250 TABLET, FILM COATED ORAL at 17:28

## 2023-01-01 RX ADMIN — Medication 500 MILLIGRAM(S): at 15:08

## 2023-01-01 RX ADMIN — Medication 1 APPLICATION(S): at 17:32

## 2023-01-01 RX ADMIN — Medication 25 MILLIGRAM(S): at 05:26

## 2023-01-01 RX ADMIN — Medication 1 APPLICATION(S): at 17:28

## 2023-01-01 RX ADMIN — Medication 500 MILLIGRAM(S): at 05:24

## 2023-01-01 RX ADMIN — LEVETIRACETAM 1500 MILLIGRAM(S): 250 TABLET, FILM COATED ORAL at 05:25

## 2023-01-01 RX ADMIN — PREGABALIN 1000 MICROGRAM(S): 225 CAPSULE ORAL at 12:07

## 2023-01-01 RX ADMIN — Medication 1 APPLICATION(S): at 05:26

## 2023-01-01 RX ADMIN — ENOXAPARIN SODIUM 80 MILLIGRAM(S): 100 INJECTION SUBCUTANEOUS at 17:29

## 2023-01-01 NOTE — CHART NOTE - NSCHARTNOTEFT_GEN_A_CORE
Pt with PMHx of HTN, HLD, CAD (s/p CABG), CHF, COPD, DM, CKD 3, CVA, & BPH.  Pt admitted for hypoglycemia, cardiac arrest, hypothermia, with acute respiratory failure, now chronic (s/p intubation, failed extubation, now trach to vent with PEG; on weaning trial, possibly for trach collar), ELMER (resolved), shock liver (resolved), LUE VTE with cellulitis/infected L hand hematoma (s/p debridement 12/13), sacral ulcer (s/p debridement), new onset seizure (likely due to anoxic brain injury), normocytic anemia (H/H stable; on B12 and folic acid supplements via PEG)  Pt undocumented, PRUCOL/guardianship process initiated; SW/CM continuing to follow regarding disposition.    Factors impacting intake: [ ] none [ ] nausea  [ ] vomiting [ ] diarrhea [ ] constipation  [ ]chewing problems [ ] swallowing issues  [x] other: PEG feedings    Diet Prescription: Diet, NPO with Tube Feed:   Tube Feeding Modality: Gastrostomy  Glucerna 1.2 Pedrito  Total Volume for 24 Hours (mL): 1440  Continuous  Until Goal Tube Feed Rate (mL per Hour): 60  Tube Feed Duration (in Hours): 24  Tube Feed Start Time: 12:15  Free Water Flush Instructions:  automatic water flus via pump @ 20 ml/hr (12-23-22 @ 12:08)    Intake: Observed tube feed infusing @ goal rate of 60ml/hr, providing 1440 ml total volume, 1728 kcal, 86 g protein, 1166 ml free H2O; providing >75% of pt's estimated protein-energy needs. Per RN, pt tolerating feeding well, no residuals.    Current Weight: No recent weights to trend; request current wt  % Weight Change: N/A    2+ edema L hand    Physical appearance: Pt sleeping at time of visit; with visible muscle wasting and fat depletion in following regions: orbital(mild), temples(mild), clavicle(mild)     Pertinent Medications: MEDICATIONS  (STANDING):  aspirin  chewable 81 milliGRAM(s) Oral daily  atorvastatin 20 milliGRAM(s) Oral at bedtime  bacitracin   Ointment 1 Application(s) Topical two times a day  budesonide 160 MICROgram(s)/formoterol 4.5 MICROgram(s) Inhaler 2 Puff(s) Inhalation two times a day  chlorhexidine 0.12% Liquid 15 milliLiter(s) Oral Mucosa every 12 hours  chlorhexidine 2% Cloths 1 Application(s) Topical <User Schedule>  collagenase Ointment 1 Application(s) Topical two times a day  cyanocobalamin 1000 MICROGram(s) Oral daily  enoxaparin Injectable 80 milliGRAM(s) SubCutaneous every 12 hours  folic acid 1 milliGRAM(s) Oral daily  insulin glargine Injectable (LANTUS) 20 Unit(s) SubCutaneous every morning  insulin lispro (ADMELOG) corrective regimen sliding scale   SubCutaneous every 6 hours  levETIRAcetam 1500 milliGRAM(s) Oral two times a day  metoprolol tartrate 25 milliGRAM(s) Oral two times a day  polyethylene glycol 3350 17 Gram(s) Oral daily  senna 2 Tablet(s) Oral at bedtime  silver sulfADIAZINE 1% Cream 1 Application(s) Topical two times a day  valproic  acid Syrup 500 milliGRAM(s) Enteral Tube every 8 hours    MEDICATIONS  (PRN):  acetaminophen     Tablet .. 650 milliGRAM(s) Oral every 6 hours PRN Temp greater or equal to 38C (100.4F), Mild Pain (1 - 3)  albuterol    90 MICROgram(s) HFA Inhaler 2 Puff(s) Inhalation every 6 hours PRN Shortness of Breath and/or Wheezing  aluminum hydroxide/magnesium hydroxide/simethicone Suspension 30 milliLiter(s) Oral every 4 hours PRN Dyspepsia    Pertinent Labs: 01-01 Na139 mmol/L Glu 102 mg/dL<H> K+ 4.2 mmol/L Cr  0.81 mg/dL BUN 27 mg/dL<H>  11-14 HgbA1c 8.8%<H>    POCT Blood Glucose.: 150 mg/dL (01 Jan 2023 07:48)  POCT Blood Glucose.: 120 mg/dL (31 Dec 2022 23:49)  POCT Blood Glucose.: 73 mg/dL (31 Dec 2022 18:39)  POCT Blood Glucose.: 106 mg/dL (31 Dec 2022 11:31)    Skin: Stage III pressure ulcer - sacrum; Suspected DTI - L ear (healed); Skin tear - L hand    Estimated Needs:   [x] no change since previous assessment on 11/26  [ ] recalculated:     Previous Nutrition Diagnosis:   [x] Increased Nutrient Needs; protein-energy   Related to: increased demand for nutrient   As evidenced by: pressure ulcers  addendum; wounds  Goal: pt to meet >75% protein-energy needs; met    Nutrition Diagnosis is [ ] ongoing  [ ] resolved [x] not applicable - second nutrition diagnosis (malnutrition - see below) addresses this    [x] Malnutrition; moderate malnutrition in context of acute illness   Related to: increased protein energy needs in setting of cardiac arrest, acute respiratory failure, s/p ELMER, shock liver, pressure ulcers/wounds  As evidenced by: mild muscle/fat loss  Goal: pt to meet >75% protein-energy needs via tolerated route; met    Nutrition Diagnosis is [x] ongoing  [ ] resolved [ ] not applicable     New Nutrition Diagnosis: [x] not applicable       Interventions:   Recommend  [ ] Change Diet To:  [ ] Nutrition Supplement  [x] Nutrition Support: Continue with current tube feed regimen as noted  [x] Other: Swallow evaluation when appropriate    Monitoring and Evaluation:   [ ] PO intake [ x ] Tolerance to diet prescription [ x ] weights [ x ] labs[ x ] follow up per protocol  [ ] other: Pt with PMHx of HTN, HLD, CAD (s/p CABG), CHF, COPD, DM, CKD 3, CVA, & BPH.  Pt admitted for hypoglycemia, cardiac arrest, hypothermia, with acute respiratory failure, now chronic (s/p intubation, failed extubation, now trach to vent with PEG; on weaning trial, possibly for trach collar), ELMER (resolved), shock liver (resolved), LUE VTE with cellulitis/infected L hand hematoma (s/p debridement 12/13), sacral ulcer (s/p debridement), new onset seizure (likely due to anoxic brain injury), normocytic anemia (H/H stable; on B12 and folic acid supplements via PEG)  Pt undocumented, PRUCOL/guardianship process initiated; SW/CM continuing to follow regarding disposition.    Factors impacting intake: [ ] none [ ] nausea  [ ] vomiting [ ] diarrhea [ ] constipation  [ ]chewing problems [ ] swallowing issues  [x] other: PEG feedings    Diet Prescription: Diet, NPO with Tube Feed:   Tube Feeding Modality: Gastrostomy  Glucerna 1.2 Pedrito  Total Volume for 24 Hours (mL): 1440  Continuous  Until Goal Tube Feed Rate (mL per Hour): 60  Tube Feed Duration (in Hours): 24  Tube Feed Start Time: 12:15  Free Water Flush Instructions:  automatic water flus via pump @ 20 ml/hr (12-23-22 @ 12:08)    Intake: Observed tube feed infusing @ goal rate of 60ml/hr, providing 1440 ml total volume, 1728 kcal, 86 g protein, 1166 ml free H2O; providing >75% of pt's estimated protein-energy needs. Per RN, pt tolerating feeding well, no residuals.    Current Weight: No recent weights to trend; request current wt  % Weight Change: N/A    2+ edema L hand    Physical appearance: Pt sleeping at time of visit; with visible muscle wasting and fat depletion in following regions: orbital(mild), temples(mild), clavicle(mild)     Pertinent Medications: MEDICATIONS  (STANDING):  aspirin  chewable 81 milliGRAM(s) Oral daily  atorvastatin 20 milliGRAM(s) Oral at bedtime  bacitracin   Ointment 1 Application(s) Topical two times a day  budesonide 160 MICROgram(s)/formoterol 4.5 MICROgram(s) Inhaler 2 Puff(s) Inhalation two times a day  chlorhexidine 0.12% Liquid 15 milliLiter(s) Oral Mucosa every 12 hours  chlorhexidine 2% Cloths 1 Application(s) Topical <User Schedule>  collagenase Ointment 1 Application(s) Topical two times a day  cyanocobalamin 1000 MICROGram(s) Oral daily  enoxaparin Injectable 80 milliGRAM(s) SubCutaneous every 12 hours  folic acid 1 milliGRAM(s) Oral daily  insulin glargine Injectable (LANTUS) 20 Unit(s) SubCutaneous every morning  insulin lispro (ADMELOG) corrective regimen sliding scale   SubCutaneous every 6 hours  levETIRAcetam 1500 milliGRAM(s) Oral two times a day  metoprolol tartrate 25 milliGRAM(s) Oral two times a day  polyethylene glycol 3350 17 Gram(s) Oral daily  senna 2 Tablet(s) Oral at bedtime  silver sulfADIAZINE 1% Cream 1 Application(s) Topical two times a day  valproic  acid Syrup 500 milliGRAM(s) Enteral Tube every 8 hours    MEDICATIONS  (PRN):  acetaminophen     Tablet .. 650 milliGRAM(s) Oral every 6 hours PRN Temp greater or equal to 38C (100.4F), Mild Pain (1 - 3)  albuterol    90 MICROgram(s) HFA Inhaler 2 Puff(s) Inhalation every 6 hours PRN Shortness of Breath and/or Wheezing  aluminum hydroxide/magnesium hydroxide/simethicone Suspension 30 milliLiter(s) Oral every 4 hours PRN Dyspepsia    Pertinent Labs: 01-01 Na139 mmol/L Glu 102 mg/dL<H> K+ 4.2 mmol/L Cr  0.81 mg/dL BUN 27 mg/dL<H>  11-14 HgbA1c 8.8%<H>    POCT Blood Glucose.: 150 mg/dL (01 Jan 2023 07:48)  POCT Blood Glucose.: 120 mg/dL (31 Dec 2022 23:49)  POCT Blood Glucose.: 73 mg/dL (31 Dec 2022 18:39)  POCT Blood Glucose.: 106 mg/dL (31 Dec 2022 11:31)    Skin: Stage III pressure ulcer - sacrum; Suspected DTI - L ear (healed); Skin tear - L hand    Estimated Needs:   [x] no change since previous assessment on 11/26  [ ] recalculated:     Previous Nutrition Diagnosis #1:   [x] Increased Nutrient Needs; protein-energy   Related to: increased demand for nutrient   As evidenced by: pressure ulcers  addendum; wounds  Goal: pt to meet >75% protein-energy needs; met    Nutrition Diagnosis is [ ] ongoing  [ ] resolved [x] not applicable - second nutrition diagnosis (malnutrition - see below) addresses this    Previous Nutrition Diagnosis #2:  [x] Malnutrition; moderate malnutrition in context of acute illness   Related to: increased protein energy needs in setting of cardiac arrest, acute respiratory failure, s/p ELMER, shock liver, pressure ulcers/wounds  As evidenced by: mild muscle/fat loss  Goal: pt to meet >75% protein-energy needs via tolerated route; met    Nutrition Diagnosis is [x] ongoing  [ ] resolved [ ] not applicable     New Nutrition Diagnosis: [x] not applicable       Interventions:   Recommend  [ ] Change Diet To:  [ ] Nutrition Supplement  [x] Nutrition Support: Continue with current tube feed regimen as noted  [x] Other: Swallow evaluation when appropriate    Monitoring and Evaluation:   [ ] PO intake [ x ] Tolerance to diet prescription [ x ] weights [ x ] labs[ x ] follow up per protocol  [ ] other:

## 2023-01-01 NOTE — PROGRESS NOTE ADULT - SUBJECTIVE AND OBJECTIVE BOX
BRANDON CAMARILLO    [facility] [address] [address]    Allergies    No Known Allergies    Intolerances        PAST MEDICAL & SURGICAL HISTORY:  HTN (hypertension)      HLD (hyperlipidemia)      Diabetes mellitus      Lacunar infarction      BPH (benign prostatic hyperplasia)      CHF (congestive heart failure)      Chronic obstructive pulmonary disease, unspecified COPD type      S/P CABG (coronary artery bypass graft)  2014          FAMILY HISTORY:      Home Medications:  aspirin 81 mg oral delayed release tablet: 1 tab(s) orally once a day (12 Jun 2020 17:35)  Liquifilm Tears preserved ophthalmic solution: 1 drop(s) to each affected eye 2 times a day (12 Jun 2020 17:35)      MEDICATIONS  (STANDING):  aspirin  chewable 81 milliGRAM(s) Oral daily  atorvastatin 20 milliGRAM(s) Oral at bedtime  bacitracin   Ointment 1 Application(s) Topical two times a day  budesonide 160 MICROgram(s)/formoterol 4.5 MICROgram(s) Inhaler 2 Puff(s) Inhalation two times a day  chlorhexidine 0.12% Liquid 15 milliLiter(s) Oral Mucosa every 12 hours  chlorhexidine 2% Cloths 1 Application(s) Topical <User Schedule>  collagenase Ointment 1 Application(s) Topical two times a day  cyanocobalamin 1000 MICROGram(s) Oral daily  enoxaparin Injectable 80 milliGRAM(s) SubCutaneous every 12 hours  folic acid 1 milliGRAM(s) Oral daily  insulin glargine Injectable (LANTUS) 20 Unit(s) SubCutaneous every morning  insulin lispro (ADMELOG) corrective regimen sliding scale   SubCutaneous every 6 hours  levETIRAcetam 1500 milliGRAM(s) Oral two times a day  metoprolol tartrate 25 milliGRAM(s) Oral two times a day  polyethylene glycol 3350 17 Gram(s) Oral daily  senna 2 Tablet(s) Oral at bedtime  silver sulfADIAZINE 1% Cream 1 Application(s) Topical two times a day  valproic  acid Syrup 500 milliGRAM(s) Enteral Tube every 8 hours    MEDICATIONS  (PRN):  acetaminophen     Tablet .. 650 milliGRAM(s) Oral every 6 hours PRN Temp greater or equal to 38C (100.4F), Mild Pain (1 - 3)  albuterol    90 MICROgram(s) HFA Inhaler 2 Puff(s) Inhalation every 6 hours PRN Shortness of Breath and/or Wheezing  aluminum hydroxide/magnesium hydroxide/simethicone Suspension 30 milliLiter(s) Oral every 4 hours PRN Dyspepsia      Diet, NPO with Tube Feed:   Tube Feeding Modality: Gastrostomy  Glucerna 1.2 Pedrito  Total Volume for 24 Hours (mL): 1440  Continuous  Until Goal Tube Feed Rate (mL per Hour): 60  Tube Feed Duration (in Hours): 24  Tube Feed Start Time: 12:15  Free Water Flush Instructions:  automatic water flus via pump @ 20 ml/hr (12-23-22 @ 12:08) [Active]          Vital Signs Last 24 Hrs  T(C): 37.4 (01 Jan 2023 04:39), Max: 37.4 (01 Jan 2023 04:39)  T(F): 99.3 (01 Jan 2023 04:39), Max: 99.3 (01 Jan 2023 04:39)  HR: 80 (01 Jan 2023 08:51) (55 - 83)  BP: 118/71 (01 Jan 2023 04:39) (100/57 - 122/73)  BP(mean): --  RR: 18 (01 Jan 2023 04:39) (18 - 18)  SpO2: 98% (01 Jan 2023 08:51) (98% - 100%)    Parameters below as of 31 Dec 2022 18:17  Patient On (Oxygen Delivery Method): ventilator            Mode: AC/ CMV (Assist Control/ Continuous Mandatory Ventilation), RR (machine): 14, TV (machine): 450, FiO2: 30, PEEP: 5, ITime: 1, MAP: 9, PIP: 28      LABS:                        9.9    6.06  )-----------( 168      ( 01 Jan 2023 05:57 )             33.3     01-01    139  |  104  |  27<H>  ----------------------------<  102<H>  4.2   |  31  |  0.81    Ca    8.6      01 Jan 2023 05:57                WBC:  WBC Count: 6.06 K/uL (01-01 @ 05:57)  WBC Count: 6.93 K/uL (12-28 @ 12:32)      MICROBIOLOGY:  RECENT CULTURES:                  Sodium:  Sodium, Serum: 139 mmol/L (01-01 @ 05:57)      0.81 mg/dL 01-01 @ 05:57      Hemoglobin:  Hemoglobin: 9.9 g/dL (01-01 @ 05:57)  Hemoglobin: 9.3 g/dL (12-28 @ 12:32)      Platelets: Platelet Count - Automated: 168 K/uL (01-01 @ 05:57)  Platelet Count - Automated: 311 K/uL (12-28 @ 12:32)              RADIOLOGY & ADDITIONAL STUDIES:      MICROBIOLOGY:  RECENT CULTURES:

## 2023-01-01 NOTE — PROGRESS NOTE ADULT - ASSESSMENT
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 Unknown how long pt unresponsive for at home. Blood sugar in the 40s with EMS s/p glucagon. On arrival to ER pt hypothermic and agonally breathing. Pt became unresponsive with loss of pulse; ACLS initiated. PEA arrest with ROSC after approximately 5 min s/p Epi x2.  Pt intubated during CODE BLUE. Per son pt on the day prior to presentation reported low blood sugar reads on his glucometer in the range of 80s. Son believes pt continued to take his insulin and oral diabetic regimen. Son states that pt was eating but may have been eating less in the last few days. Later on pt noted to have tonic-clonic Sz.     Problem/Plan-1:  Problem: PEA arrest;s/p Epi x 2. ROSC achieved  CAD/ CHF history:  Takotsubo cardiomyopathy found on Echo   stable  On Coreg, Entresto/ Lasix/ ASA, Plavix at home, now on asa, metoprolol   12/13--cardio eval appreciated, outpt follow up  - will resume Entresto on d/c    Problem/Plan-2:  Problem: Acute hypoxemic respiratory failure now chronic  s/p intubation , failed extubation, now trach to vent  c/w vent managment  pulm consulted  Continue weaning trial, Pt extubated overnight 2 nights ago, trach placed back in, possibly can switch to trach collar next wk  12/12 trach sutures removed by surgery   Albuterol and Symbicort added for H/O COPD, asthma      Problem/Plan-3:  Problem: LUE VTE with cellulitis /infected left hand hematoma s/p bedside debridement 12/13   LUE edema and wound of left hand ; good radial pulse   --LUE venous duplex : occlusive thrombus in the left mid subclavian vein with partial   slow flow detected in the lateral subclavian vein.  --LUE arterial Duplex neg   --on lovenox   --vascular following   --12/13 Hand surgery consult appreciated, s/p bedside debridement.   --ID consult appreciated  --antibiotic course completed   - now giving warm and cold packs     Problem/Plan-4:  Problem: Sacral Ulcer  -s/p debridement on 12/19    Problem/Plan-5:  Problem: New onset Seizure likely due to anoxic brain injury  discussed with Dr. Loo, patient will be only on Keppra 1500mg bid   off vimpat and Depakote   monitor     Problem/Plan-6:  Problem: ELMER 2 to ATN  ; resolved    Problem/Plan-7:  Problem: Shock liver.    due to PEA arrest. resolved    Problem/Plan-8:  Problem:DM2   A1c 8.8%  continue with current management   FS goal 140-180   continue with PEG feeds     Problem/Plan-9:  Problem: Hypophosphatemia --repleted     Problem/Plan-10:  Problem: Ustageable sacral pressure wound   s/p debridement     Problem/Plan-11:  Problem:Normocytic anemia   no e/o bleeding or bruising   H/H stable   low normal Vit B12 and folic acid --supplement       Preventative measures   heparin gtt --dvt ppx  fall, aspiration  precautions , remove mittens.   ALEXIS

## 2023-01-01 NOTE — PROGRESS NOTE ADULT - ASSESSMENT
REVIEW OF SYMPTOMS      Able to give (reliable) ROS  NO     PHYSICAL EXAM    HEENT Unremarkable  atraumatic   RESP Fair air entry EXP prolonged    Harsh breath sound Resp distres mild   CARDIAC S1 S2 No S3     NO JVD    ABDOMEN SOFT BS PRESENT NOT DISTENDED No hepatosplenomegaly   PEDAL EDEMA present No calf tenderness  NO rash       GENERAL DATA .   GOC.  12/11/2022 full code       ALLGY.  nka                            WT. ..  12/2/2022 86  BMI. ..    12/2/2022 29                          ICU STAY.  .. 11/29-12/9  COVID.   .. 12/2/2022 scv2 (-)   .. 11/20/2022 scv2 (-)   BEST PRACTICE ISSUES.    HOB ELEVATN. Yes  DVT PPLX.   12/15/2022 lvnx 80.2 ( l sc thrombus)    ALEJO PPLX.   ..      INFN PPLX.   .. 11/25 chlorhex .12%   .. 11/14 chlorhex 2%   SP SW ANTWAN.         DIET.    ..  12/15 glucerna 1.2 1440 gt   IV fl.  ..            PROCEDURES.  .. 12/19 debridement of sacral wound   .. 12/5/2022 trach  .. 12/5/2022 peg   .. 12/12 r arm midline       ABGS.  12/3/2022 ac 14/450/.3/5 750/46/75      VS/ PO/IO/ VENT/ DRIPS.  1/1/2023 67 120/70   1/1/2023 cpap 5/5/.3     MAIN ISSUES.  72 m doa 11/14/2022 cac  PMH CAD s/p PCI with stent 2015 and CABG 2014,   PMH  s/p medtronic PPM,   PMH CVA (lacunar infarct)    1) PEA arrest with ROSC after approximately 5 min 11/14  Now communicative   2) DVT 12/12 l Subclav thromS 12/15/2022 lvnx 80.2  3) TRACH 12/5/2022 trach  4) PEG12/5/2022 peg   5) Cellulitis hand absc 12/13 mod enterococcus fecalis  staph epi 12/12-12/15/2022  cephalexin 12/15 vanco once  12/16-12/19 zosyn  6) Vent weaning       PROBLEM ASSESSMENT RECOMMENDATIONS..  Sp  cac 11/14/2022   .. Patient is interactive and answers questions appropriately as dw son on 12/13/2022   VTE.  .. 12/12/2022 Pt found to have l subclav dvt    12/12/2022 iv ufh startd   .. 12/15/2022 changed to lvnx   Trach 12/5   .. trach care   COPD.  .. 12/22/2022 symbicort 80 DCed  .. 12/30 symbicort 160   vent management.  .. vent bundle dsbt dsv target pplat 30 (-) PO2 60 (+) pH 730 (+)   weaming.  .. 12/13-12/14-12/16/2022 tolerated cpap 12/13-12/14-12/16/20 rsbi 78-94-71  .. 12/15/2022 tolerated cpap 4 h  .. 12/18/2022 dw rt Pt not tolerating cpap gets agitated after few min on 12/17    .. 12/22/2022 dw resp and with pt son bedside Pt has not been tolerating cpap   .. 12/24/2022 dw rt Try cpap 5 ps5 and if toerates will place on tc 12/25 12/25/2022 failed cpap 5/5/ on 12/24   Infection.  ..  Monitor for vap    cellulitis hand   .. w 12/24-12/27/2022 w 8.9- 8.5    .. absc 12/13 mod enterococcus fecalis  staph epi   .. sp course of abio   CAD.  .. 11/14 asa 81   .. 12/13 metoprolol 25.2   .. Monitor   CHF.   .. 11/14/2022 echo mod decr segmental lvsf apical lateral apiccal ant segment are abn takotsubo cmpthy pasp 42 .  .. monitor for chf   Dysphagia.  .. 12/24/2022 speech eval requestd   anemia.  .. Hb 12/16-12/17-12/18-12/19-12/20-12/23-12/-12/27/2022   Hb 8.9 - 9.5- 8.9  - 9.2 - 9.1 - 8.8 - 9.9 - 10   .. Monitor  Target Hb 7 (+)   DECUB.  .. 12/2 collagenase   SEIZURES.  .. sz meds as guided by neuro   FAMILY DISCUSSION.  .. 12/31/2022 dw son bedside   NONVIOLENT NONSELF DESTRUCTIVE LEVEL 1  .. 12/20/2022   .. 12/13/2022     TIME SPENT   Over 25 minutes aggregate care time spent on encounter; activities included   direct patient care, counseling and/or coordinating care reviewing notes, lab data/ imaging , discussion with multidisciplinary team/ patient  /family and explaining in detail risks, benefits, alternatives  of the recommendations     MARQUISE TAYLOR 72 m 11/14/2022 1948 Dr Leela Dowling

## 2023-01-01 NOTE — PROGRESS NOTE ADULT - SUBJECTIVE AND OBJECTIVE BOX
Patient is a 72y old  Male who presents with a chief complaint of s/p cardiac arrest, hypoglycemia (01 Jan 2023 09:50)    HPI:  Mr. Hamilton is a 72 year old male with a PMH of lacunar infarct, CHF (EF ~40% per son), CAD s/p PCI with stent and CABGx3 (~2020), s/p PPM (medtronic), HTN, COPD, DMII on insulin and PO meds, BPH, and CKD (stage II?) presenting to ICU s/p cardiac arrest in ED. Per patient son he has recently been doing well and in his normal state of health, however, yesterday Mr. Hamilton started c/o low blood sugars on glucometer. Pt reportedly continued to take his home medications, including both oral antidiabetic agents and insulin (both long and short acting). Today his son found him minimally responsive hanging off his bed, checked his glucose and it read "low," so he called 911. In ED pt was noted to be hypothermic with glucose noted to be 70 just prior to PEA arrest. Pt was coded for ~5 minutes with 2 epi given with ROSC. Pt intubated at that time. is moving all extremities equally and with reactive, equal pupils. He remained hypothermic and is not requiring vasopressor support. Pending CT Scans. ROS otherwise negative per son besides hypoglycemia, with no known sick contacts. Pt accepted to ICU for further management / care.  (14 Nov 2022 01:33)    SUBJECTIVE & OBJECTIVE: Pt seen and examined at bedside.   PHYSICAL EXAM:  T(C): 37.4 (01-01-23 @ 04:39), Max: 37.4 (01-01-23 @ 04:39)  HR: 80 (01-01-23 @ 08:51) (73 - 83)  BP: 118/71 (01-01-23 @ 04:39) (112/65 - 122/73)  RR: 18 (01-01-23 @ 04:39) (18 - 18)  SpO2: 98% (01-01-23 @ 08:51) (98% - 100%) Device: BV, Mode: AC/ CMV (Assist Control/ Continuous Mandatory Ventilation), RR (machine): 14, RR (patient): 15, TV (machine): 450, TV (patient): 426, FiO2: 30, PEEP: 5, ITime: 1, MAP: 9, PIP: 28Daily     Daily I&O's Detail    GENERAL: NAD, well-groomed, well-developed  HEAD:  Atraumatic, Normocephalic  EYES: EOMI, PERRLA, conjunctiva and sclera clear  ENMT: Moist mucous membranes  NECK: Supple, No JVD, trach collar in place  NERVOUS SYSTEM:  Alert & Oriented X3, Motor Strength decreased  CHEST/LUNG: Coarse breath sounds  HEART: Regular rate and rhythm; No murmurs, rubs, or gallops  ABDOMEN: Soft, Nontender, Nondistended; Bowel sounds present, peg in place   EXTREMITIES:  2+ Peripheral Pulses, No clubbing, cyanosis, or edema  LABS:                        9.9    6.06  )-----------( 168      ( 01 Jan 2023 05:57 )             33.3   CAPILLARY BLOOD GLUCOSE      POCT Blood Glucose.: 150 mg/dL (01 Jan 2023 07:48)  POCT Blood Glucose.: 120 mg/dL (31 Dec 2022 23:49)  POCT Blood Glucose.: 73 mg/dL (31 Dec 2022 18:39)  POCT Blood Glucose.: 106 mg/dL (31 Dec 2022 11:31)    RECENT CULTURES:   RADIOLOGY & ADDITIONAL TESTS:

## 2023-01-02 LAB
GLUCOSE BLDC GLUCOMTR-MCNC: 100 MG/DL — HIGH (ref 70–99)
GLUCOSE BLDC GLUCOMTR-MCNC: 124 MG/DL — HIGH (ref 70–99)
GLUCOSE BLDC GLUCOMTR-MCNC: 163 MG/DL — HIGH (ref 70–99)
GLUCOSE BLDC GLUCOMTR-MCNC: 175 MG/DL — HIGH (ref 70–99)

## 2023-01-02 PROCEDURE — 99233 SBSQ HOSP IP/OBS HIGH 50: CPT

## 2023-01-02 PROCEDURE — 99232 SBSQ HOSP IP/OBS MODERATE 35: CPT

## 2023-01-02 RX ADMIN — ENOXAPARIN SODIUM 80 MILLIGRAM(S): 100 INJECTION SUBCUTANEOUS at 17:36

## 2023-01-02 RX ADMIN — ATORVASTATIN CALCIUM 20 MILLIGRAM(S): 80 TABLET, FILM COATED ORAL at 21:48

## 2023-01-02 RX ADMIN — Medication 1 APPLICATION(S): at 06:45

## 2023-01-02 RX ADMIN — Medication 500 MILLIGRAM(S): at 06:34

## 2023-01-02 RX ADMIN — CHLORHEXIDINE GLUCONATE 15 MILLILITER(S): 213 SOLUTION TOPICAL at 17:20

## 2023-01-02 RX ADMIN — Medication 1 APPLICATION(S): at 17:20

## 2023-01-02 RX ADMIN — CHLORHEXIDINE GLUCONATE 1 APPLICATION(S): 213 SOLUTION TOPICAL at 06:35

## 2023-01-02 RX ADMIN — Medication 1 APPLICATION(S): at 17:35

## 2023-01-02 RX ADMIN — Medication 1 APPLICATION(S): at 06:36

## 2023-01-02 RX ADMIN — PREGABALIN 1000 MICROGRAM(S): 225 CAPSULE ORAL at 11:49

## 2023-01-02 RX ADMIN — LEVETIRACETAM 1500 MILLIGRAM(S): 250 TABLET, FILM COATED ORAL at 06:34

## 2023-01-02 RX ADMIN — Medication 25 MILLIGRAM(S): at 06:34

## 2023-01-02 RX ADMIN — Medication 2: at 17:19

## 2023-01-02 RX ADMIN — SENNA PLUS 2 TABLET(S): 8.6 TABLET ORAL at 21:40

## 2023-01-02 RX ADMIN — ENOXAPARIN SODIUM 80 MILLIGRAM(S): 100 INJECTION SUBCUTANEOUS at 06:34

## 2023-01-02 RX ADMIN — BUDESONIDE AND FORMOTEROL FUMARATE DIHYDRATE 2 PUFF(S): 160; 4.5 AEROSOL RESPIRATORY (INHALATION) at 17:17

## 2023-01-02 RX ADMIN — CHLORHEXIDINE GLUCONATE 15 MILLILITER(S): 213 SOLUTION TOPICAL at 06:33

## 2023-01-02 RX ADMIN — BUDESONIDE AND FORMOTEROL FUMARATE DIHYDRATE 2 PUFF(S): 160; 4.5 AEROSOL RESPIRATORY (INHALATION) at 05:53

## 2023-01-02 RX ADMIN — POLYETHYLENE GLYCOL 3350 17 GRAM(S): 17 POWDER, FOR SOLUTION ORAL at 11:50

## 2023-01-02 RX ADMIN — Medication 1 APPLICATION(S): at 06:37

## 2023-01-02 RX ADMIN — Medication 81 MILLIGRAM(S): at 11:50

## 2023-01-02 RX ADMIN — LEVETIRACETAM 1500 MILLIGRAM(S): 250 TABLET, FILM COATED ORAL at 17:20

## 2023-01-02 RX ADMIN — Medication 25 MILLIGRAM(S): at 17:20

## 2023-01-02 RX ADMIN — Medication 2: at 12:08

## 2023-01-02 RX ADMIN — Medication 650 MILLIGRAM(S): at 00:33

## 2023-01-02 RX ADMIN — Medication 1 MILLIGRAM(S): at 11:50

## 2023-01-02 NOTE — PROGRESS NOTE ADULT - SUBJECTIVE AND OBJECTIVE BOX
BRANDON CAMARILLO    [facility] [address] [address]    Allergies    No Known Allergies    Intolerances        PAST MEDICAL & SURGICAL HISTORY:  HTN (hypertension)      HLD (hyperlipidemia)      Diabetes mellitus      Lacunar infarction      BPH (benign prostatic hyperplasia)      CHF (congestive heart failure)      Chronic obstructive pulmonary disease, unspecified COPD type      S/P CABG (coronary artery bypass graft)  2014          FAMILY HISTORY:      Home Medications:  aspirin 81 mg oral delayed release tablet: 1 tab(s) orally once a day (12 Jun 2020 17:35)  Liquifilm Tears preserved ophthalmic solution: 1 drop(s) to each affected eye 2 times a day (12 Jun 2020 17:35)      MEDICATIONS  (STANDING):  aspirin  chewable 81 milliGRAM(s) Oral daily  atorvastatin 20 milliGRAM(s) Oral at bedtime  bacitracin   Ointment 1 Application(s) Topical two times a day  budesonide 160 MICROgram(s)/formoterol 4.5 MICROgram(s) Inhaler 2 Puff(s) Inhalation two times a day  chlorhexidine 0.12% Liquid 15 milliLiter(s) Oral Mucosa every 12 hours  chlorhexidine 2% Cloths 1 Application(s) Topical <User Schedule>  collagenase Ointment 1 Application(s) Topical two times a day  cyanocobalamin 1000 MICROGram(s) Oral daily  enoxaparin Injectable 80 milliGRAM(s) SubCutaneous every 12 hours  folic acid 1 milliGRAM(s) Oral daily  insulin glargine Injectable (LANTUS) 20 Unit(s) SubCutaneous every morning  insulin lispro (ADMELOG) corrective regimen sliding scale   SubCutaneous every 6 hours  levETIRAcetam 1500 milliGRAM(s) Oral two times a day  LORazepam   Injectable 0.5 milliGRAM(s) IV Push once  metoprolol tartrate 25 milliGRAM(s) Oral two times a day  polyethylene glycol 3350 17 Gram(s) Oral daily  senna 2 Tablet(s) Oral at bedtime  silver sulfADIAZINE 1% Cream 1 Application(s) Topical two times a day  valproic  acid Syrup 500 milliGRAM(s) Enteral Tube every 8 hours    MEDICATIONS  (PRN):  acetaminophen     Tablet .. 650 milliGRAM(s) Oral every 6 hours PRN Temp greater or equal to 38C (100.4F), Mild Pain (1 - 3)  albuterol    90 MICROgram(s) HFA Inhaler 2 Puff(s) Inhalation every 6 hours PRN Shortness of Breath and/or Wheezing  aluminum hydroxide/magnesium hydroxide/simethicone Suspension 30 milliLiter(s) Oral every 4 hours PRN Dyspepsia      Diet, NPO with Tube Feed:   Tube Feeding Modality: Gastrostomy  Glucerna 1.2 Pedrito  Total Volume for 24 Hours (mL): 1440  Continuous  Until Goal Tube Feed Rate (mL per Hour): 60  Tube Feed Duration (in Hours): 24  Tube Feed Start Time: 12:15  Free Water Flush Instructions:  automatic water flus via pump @ 20 ml/hr (12-23-22 @ 12:08) [Active]          Vital Signs Last 24 Hrs  T(C): 37.1 (02 Jan 2023 04:38), Max: 37.6 (01 Jan 2023 16:32)  T(F): 98.7 (02 Jan 2023 04:38), Max: 99.6 (01 Jan 2023 16:32)  HR: 97 (02 Jan 2023 06:15) (56 - 98)  BP: 109/67 (02 Jan 2023 04:38) (106/63 - 122/70)  BP(mean): --  RR: 18 (02 Jan 2023 04:38) (18 - 18)  SpO2: 98% (02 Jan 2023 06:15) (98% - 100%)    Parameters below as of 02 Jan 2023 06:15  Patient On (Oxygen Delivery Method): ventilator,30 %             Mode: CPAP with PS, FiO2: 30, PEEP: 5, PS: 10, MAP: 9      LABS:                        9.9    6.06  )-----------( 168      ( 01 Jan 2023 05:57 )             33.3     01-01    139  |  104  |  27<H>  ----------------------------<  102<H>  4.2   |  31  |  0.81    Ca    8.6      01 Jan 2023 05:57                WBC:  WBC Count: 6.06 K/uL (01-01 @ 05:57)      MICROBIOLOGY:  RECENT CULTURES:                  Sodium:  Sodium, Serum: 139 mmol/L (01-01 @ 05:57)      0.81 mg/dL 01-01 @ 05:57      Hemoglobin:  Hemoglobin: 9.9 g/dL (01-01 @ 05:57)      Platelets: Platelet Count - Automated: 168 K/uL (01-01 @ 05:57)              RADIOLOGY & ADDITIONAL STUDIES:      MICROBIOLOGY:  RECENT CULTURES:

## 2023-01-02 NOTE — PROGRESS NOTE ADULT - SUBJECTIVE AND OBJECTIVE BOX
CHIEF COMPLAINT: Follow up of PEA arrest, chronic respiratory failure and seizure.   SBT per pulmonologist. no fever   no cp   on 10/5/30%  no vomiting or diarrhea reported.       PHYSICAL EXAM:    GENERAL: Obese, trach 10/5/30%  CHEST/LUNG: Decreased air entry bibasally. no wheezing. s/p trach   HEART: Regular rate and rhythm; + murmur  ABDOMEN: Soft, Nontender, Nondistended; Bowel sounds present.s/p PEG  EXTREMITIES:  Moving all four extremities spontaneously, No clubbing, cyanosis, or edema. localized left hand dorsal surface non tender likely chronic hematoma mass.   NERVOUS SYSTEM:  Grossly non focal. followed simple commands.   Psychiatry: AAO x 3, mood is appropriate       OBJECTIVE DATA:   Vital Signs Last 24 Hrs  T(C): 37 (02 Jan 2023 11:49), Max: 37.6 (01 Jan 2023 16:32)  T(F): 98.6 (02 Jan 2023 11:49), Max: 99.6 (01 Jan 2023 16:32)  HR: 73 (02 Jan 2023 11:49) (60 - 98)  BP: 136/74 (02 Jan 2023 11:49) (106/63 - 136/74)  BP(mean): --  RR: 20 (02 Jan 2023 11:49) (18 - 20)  SpO2: 97% (02 Jan 2023 11:49) (97% - 100%)    Parameters below as of 02 Jan 2023 06:15  Patient On (Oxygen Delivery Method): ventilator,30 %                Daily     Daily   LABS:                        9.9    6.06  )-----------( 168      ( 01 Jan 2023 05:57 )             33.3             01-01    139  |  104  |  27<H>  ----------------------------<  102<H>  4.2   |  31  |  0.81    Ca    8.6      01 Jan 2023 05:57                          CAPILLARY BLOOD GLUCOSE      POCT Blood Glucose.: 163 mg/dL (02 Jan 2023 11:51)         MEDICATIONS  (STANDING):  aspirin  chewable 81 milliGRAM(s) Oral daily  atorvastatin 20 milliGRAM(s) Oral at bedtime  bacitracin   Ointment 1 Application(s) Topical two times a day  budesonide 160 MICROgram(s)/formoterol 4.5 MICROgram(s) Inhaler 2 Puff(s) Inhalation two times a day  chlorhexidine 0.12% Liquid 15 milliLiter(s) Oral Mucosa every 12 hours  chlorhexidine 2% Cloths 1 Application(s) Topical <User Schedule>  collagenase Ointment 1 Application(s) Topical two times a day  cyanocobalamin 1000 MICROGram(s) Oral daily  enoxaparin Injectable 80 milliGRAM(s) SubCutaneous every 12 hours  folic acid 1 milliGRAM(s) Oral daily  insulin glargine Injectable (LANTUS) 20 Unit(s) SubCutaneous every morning  insulin lispro (ADMELOG) corrective regimen sliding scale   SubCutaneous every 6 hours  levETIRAcetam 1500 milliGRAM(s) Oral two times a day  LORazepam   Injectable 0.5 milliGRAM(s) IV Push once  metoprolol tartrate 25 milliGRAM(s) Oral two times a day  polyethylene glycol 3350 17 Gram(s) Oral daily  senna 2 Tablet(s) Oral at bedtime  silver sulfADIAZINE 1% Cream 1 Application(s) Topical two times a day  valproic  acid Syrup 500 milliGRAM(s) Enteral Tube every 8 hours    MEDICATIONS  (PRN):  acetaminophen     Tablet .. 650 milliGRAM(s) Oral every 6 hours PRN Temp greater or equal to 38C (100.4F), Mild Pain (1 - 3)  albuterol    90 MICROgram(s) HFA Inhaler 2 Puff(s) Inhalation every 6 hours PRN Shortness of Breath and/or Wheezing  aluminum hydroxide/magnesium hydroxide/simethicone Suspension 30 milliLiter(s) Oral every 4 hours PRN Dyspepsia

## 2023-01-02 NOTE — PROGRESS NOTE ADULT - ASSESSMENT
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 Unknown how long pt unresponsive for at home. Blood sugar in the 40s with EMS s/p glucagon. On arrival to ER pt hypothermic and agonally breathing. Pt became unresponsive with loss of pulse; ACLS initiated. PEA arrest with ROSC after approximately 5 min s/p Epi x2.  Pt intubated during CODE BLUE. Per son pt on the day prior to presentation reported low blood sugar reads on his glucometer in the range of 80s. Son believes pt continued to take his insulin and oral diabetic regimen. Son states that pt was eating but may have been eating less in the last few days. Later on pt noted to have tonic-clonic Sz.     Problem/Plan-1:  Problem: PEA arrest;s/p Epi x 2. ROSC achieved  CAD/ CHF history:  Takotsubo cardiomyopathy found on Echo   stable  cont current meds. entresto at discharge per cards.     Problem/Plan-2:  Problem: Acute hypoxemic respiratory failure now chronic  s/p intubation , failed extubation, now trach to vent  c/w vent managment and SBT.   cont current bronchodilators. discussed with pulmonologist     Problem/Plan-3:  Problem: LUE VTE with enterococcal faecalis cellulitis /infected left hand hematoma s/p bedside debridement 12/13   LUE edema and wound of left hand ; good radial pulse   --LUE venous duplex : occlusive thrombus in the left mid subclavian vein with partial   slow flow detected in the lateral subclavian vein.  --LUE arterial Duplex neg   --on lovenox   --vascular following   --12/13 Hand surgery consult appreciated, s/p bedside debridement.   --ID consult appreciated  --antibiotic course completed       Problem/Plan-5:  Problem: New onset Seizure likely due to anoxic brain injury  My colleague discussed with Dr Loo (per her note) and that patient will be only on Keppra 1500mg bid   I will DC depakote for now. I have tried contacting Dr Loo also. will try again tomorrow.       Problem/Plan-6:  Problem: ELMER 2 to ATN  ; resolved    Problem/Plan-7:  Problem: Shock liver.    due to PEA arrest. resolved    Problem/Plan-8:  Problem:DM2   A1c 8.8%  continue with current management   FS goal 140-180   continue with PEG feeds     Problem/Plan-9:  Problem: Hypophosphatemia --repleted     Problem/Plan-10:  Problem: Ustageable sacral pressure wound   s/p debridement 12/19    Problem/Plan-11:  Problem: Normocytic anemia   no e/o bleeding or bruising   H/H stable   low normal Vit B12 and folic acid --supplement       Moderate PCM and dysphagia: PEG tube feeding.     Preventative measures   dvt ppx>>>Lovenox  fall, aspiration  precautions  RITA

## 2023-01-02 NOTE — PROGRESS NOTE ADULT - ASSESSMENT
REVIEW OF SYMPTOMS      Able to give (reliable) ROS  NO     PHYSICAL EXAM    HEENT Unremarkable  atraumatic   RESP Fair air entry EXP prolonged    Harsh breath sound Resp distres mild   CARDIAC S1 S2 No S3     NO JVD    ABDOMEN SOFT BS PRESENT NOT DISTENDED No hepatosplenomegaly   PEDAL EDEMA present No calf tenderness  NO rash       GENERAL DATA .   GOC.  12/11/2022 full code       ALLGY.  nka                            WT. ..  12/2/2022 86  BMI. ..    12/2/2022 29                          ICU STAY.  .. 11/29-12/9  COVID.   .. 12/2/2022 scv2 (-)   .. 11/20/2022 scv2 (-)   BEST PRACTICE ISSUES.    HOB ELEVATN. Yes  DVT PPLX.   12/15/2022 lvnx 80.2 ( l sc thrombus)    ALEJO PPLX.   ..      INFN PPLX.   .. 11/25 chlorhex .12%   .. 11/14 chlorhex 2%   SP SW ANTWAN.         DIET.    ..  12/15 glucerna 1.2 1440 gt   IV fl.  ..            PROCEDURES.  .. 12/19 debridement of sacral wound   .. 12/5/2022 trach  .. 12/5/2022 peg   .. 12/12 r arm midline       ABGS.  12/3/2022 ac 14/450/.3/5 750/46/75      VS/ PO/IO/ VENT/ DRIPS.  1/2/2023 99f 85 120/70   1/2/2023 cpap 5/10/.3 28/281     MAIN ISSUES.  72 m doa 11/14/2022 cac  PMH CAD s/p PCI with stent 2015 and CABG 2014,   PMH  s/p medtronic PPM,   PMH CVA (lacunar infarct)    1) PEA arrest with ROSC after approximately 5 min 11/14  Now communicative   2) DVT 12/12 l Subclav thromS 12/15/2022 lvnx 80.2  3) TRACH 12/5/2022 trach  4) PEG12/5/2022 peg   5) Cellulitis hand absc 12/13 mod enterococcus fecalis  staph epi 12/12-12/15/2022  cephalexin 12/15 vanco once  12/16-12/19 zosyn  6) Vent weaning       PROBLEM ASSESSMENT RECOMMENDATIONS..  Sp  cac 11/14/2022   .. Patient is interactive and answers questions appropriately as dw son on 12/13/2022   VTE.  .. 12/12/2022 Pt found to have l subclav dvt    12/12/2022 iv ufh startd   .. 12/15/2022 changed to lvnx   Trach 12/5   .. trach care   COPD.  .. 12/22/2022 symbicort 80 DCed  .. 12/30 symbicort 160   vent management.  .. vent bundle dsbt dsv target pplat 30 (-) PO2 60 (+) pH 730 (+)   weaming.  .. 12/13-12/14-12/16/2022 tolerated cpap 12/13-12/14-12/16/20 rsbi 78-94-71  .. 12/15/2022 tolerated cpap 4 h  .. 12/18/2022 dw rt Pt not tolerating cpap gets agitated after few min on 12/17    .. 12/22/2022 dw resp and with pt son bedside Pt has not been tolerating cpap   .. 12/24/2022 dw rt Try cpap 5 ps5 and if toerates will place on tc 12/25 12/25/2022 failed cpap 5/5/ on 12/24 1/2/2023 dw rt and with son bedside continue cpap trials to trach collar  Infection.  ..  Monitor for vap    cellulitis hand   .. w 12/24-12/27/2022 w 8.9- 8.5    .. absc 12/13 mod enterococcus fecalis  staph epi   .. sp course of abio   CAD.  .. 11/14 asa 81   .. 12/13 metoprolol 25.2   .. Monitor   CHF.   .. 11/14/2022 echo mod decr segmental lvsf apical lateral apiccal ant segment are abn takotsubo cmpthy pasp 42 .  .. monitor for chf   Dysphagia.  .. 12/24/2022 speech eval requestd   anemia.  .. Hb 12/16-12/17-12/18-12/19-12/20-12/23-12/-12/27/2022   Hb 8.9 - 9.5- 8.9  - 9.2 - 9.1 - 8.8 - 9.9 - 10   .. Monitor  Target Hb 7 (+)   DECUB.  .. 12/2 collagenase   SEIZURES.  .. sz meds as guided by neuro   FAMILY DISCUSSION.  .. 12/31/2022 dw son bedside     TIME SPENT   Over 25 minutes aggregate care time spent on encounter; activities included   direct patient care, counseling and/or coordinating care reviewing notes, lab data/ imaging , discussion with multidisciplinary team/ patient  /family and explaining in detail risks, benefits, alternatives  of the recommendations     MARQUISE TAYLOR 72 m 11/14/2022 1948 Dr Leela Dowling

## 2023-01-03 LAB
GLUCOSE BLDC GLUCOMTR-MCNC: 132 MG/DL — HIGH (ref 70–99)
GLUCOSE BLDC GLUCOMTR-MCNC: 145 MG/DL — HIGH (ref 70–99)
GLUCOSE BLDC GLUCOMTR-MCNC: 156 MG/DL — HIGH (ref 70–99)
GLUCOSE BLDC GLUCOMTR-MCNC: 173 MG/DL — HIGH (ref 70–99)
GLUCOSE BLDC GLUCOMTR-MCNC: 188 MG/DL — HIGH (ref 70–99)
GLUCOSE BLDC GLUCOMTR-MCNC: 210 MG/DL — HIGH (ref 70–99)

## 2023-01-03 PROCEDURE — 99232 SBSQ HOSP IP/OBS MODERATE 35: CPT

## 2023-01-03 RX ORDER — APIXABAN 2.5 MG/1
5 TABLET, FILM COATED ORAL EVERY 12 HOURS
Refills: 0 | Status: DISCONTINUED | OUTPATIENT
Start: 2023-01-03 | End: 2023-03-18

## 2023-01-03 RX ADMIN — PREGABALIN 1000 MICROGRAM(S): 225 CAPSULE ORAL at 11:58

## 2023-01-03 RX ADMIN — BUDESONIDE AND FORMOTEROL FUMARATE DIHYDRATE 2 PUFF(S): 160; 4.5 AEROSOL RESPIRATORY (INHALATION) at 05:34

## 2023-01-03 RX ADMIN — Medication 4: at 17:34

## 2023-01-03 RX ADMIN — INSULIN GLARGINE 20 UNIT(S): 100 INJECTION, SOLUTION SUBCUTANEOUS at 08:30

## 2023-01-03 RX ADMIN — SENNA PLUS 2 TABLET(S): 8.6 TABLET ORAL at 21:36

## 2023-01-03 RX ADMIN — Medication 1 APPLICATION(S): at 05:53

## 2023-01-03 RX ADMIN — LEVETIRACETAM 1500 MILLIGRAM(S): 250 TABLET, FILM COATED ORAL at 05:53

## 2023-01-03 RX ADMIN — POLYETHYLENE GLYCOL 3350 17 GRAM(S): 17 POWDER, FOR SOLUTION ORAL at 11:58

## 2023-01-03 RX ADMIN — Medication 1 APPLICATION(S): at 05:52

## 2023-01-03 RX ADMIN — Medication 25 MILLIGRAM(S): at 17:38

## 2023-01-03 RX ADMIN — APIXABAN 5 MILLIGRAM(S): 2.5 TABLET, FILM COATED ORAL at 17:25

## 2023-01-03 RX ADMIN — Medication 25 MILLIGRAM(S): at 05:53

## 2023-01-03 RX ADMIN — CHLORHEXIDINE GLUCONATE 15 MILLILITER(S): 213 SOLUTION TOPICAL at 05:55

## 2023-01-03 RX ADMIN — ATORVASTATIN CALCIUM 20 MILLIGRAM(S): 80 TABLET, FILM COATED ORAL at 21:43

## 2023-01-03 RX ADMIN — CHLORHEXIDINE GLUCONATE 1 APPLICATION(S): 213 SOLUTION TOPICAL at 06:00

## 2023-01-03 RX ADMIN — BUDESONIDE AND FORMOTEROL FUMARATE DIHYDRATE 2 PUFF(S): 160; 4.5 AEROSOL RESPIRATORY (INHALATION) at 17:20

## 2023-01-03 RX ADMIN — Medication 1 APPLICATION(S): at 17:26

## 2023-01-03 RX ADMIN — Medication 81 MILLIGRAM(S): at 11:58

## 2023-01-03 RX ADMIN — Medication 1 MILLIGRAM(S): at 11:58

## 2023-01-03 RX ADMIN — CHLORHEXIDINE GLUCONATE 15 MILLILITER(S): 213 SOLUTION TOPICAL at 17:26

## 2023-01-03 RX ADMIN — ENOXAPARIN SODIUM 80 MILLIGRAM(S): 100 INJECTION SUBCUTANEOUS at 05:54

## 2023-01-03 RX ADMIN — Medication 1 APPLICATION(S): at 17:27

## 2023-01-03 RX ADMIN — Medication 2: at 05:59

## 2023-01-03 RX ADMIN — Medication 1 APPLICATION(S): at 05:54

## 2023-01-03 RX ADMIN — LEVETIRACETAM 1500 MILLIGRAM(S): 250 TABLET, FILM COATED ORAL at 17:25

## 2023-01-03 NOTE — PROGRESS NOTE ADULT - SUBJECTIVE AND OBJECTIVE BOX
BRANDON CAMARILLO    [facility] [address]    Allergies    No Known Allergies    Intolerances        PAST MEDICAL & SURGICAL HISTORY:  HTN (hypertension)      HLD (hyperlipidemia)      Diabetes mellitus      Lacunar infarction      BPH (benign prostatic hyperplasia)      CHF (congestive heart failure)      Chronic obstructive pulmonary disease, unspecified COPD type      S/P CABG (coronary artery bypass graft)  2014          FAMILY HISTORY:      Home Medications:  aspirin 81 mg oral delayed release tablet: 1 tab(s) orally once a day (12 Jun 2020 17:35)  Liquifilm Tears preserved ophthalmic solution: 1 drop(s) to each affected eye 2 times a day (12 Jun 2020 17:35)      MEDICATIONS  (STANDING):  aspirin  chewable 81 milliGRAM(s) Oral daily  atorvastatin 20 milliGRAM(s) Oral at bedtime  bacitracin   Ointment 1 Application(s) Topical two times a day  budesonide 160 MICROgram(s)/formoterol 4.5 MICROgram(s) Inhaler 2 Puff(s) Inhalation two times a day  chlorhexidine 0.12% Liquid 15 milliLiter(s) Oral Mucosa every 12 hours  chlorhexidine 2% Cloths 1 Application(s) Topical <User Schedule>  collagenase Ointment 1 Application(s) Topical two times a day  cyanocobalamin 1000 MICROGram(s) Oral daily  enoxaparin Injectable 80 milliGRAM(s) SubCutaneous every 12 hours  folic acid 1 milliGRAM(s) Oral daily  insulin glargine Injectable (LANTUS) 20 Unit(s) SubCutaneous every morning  insulin lispro (ADMELOG) corrective regimen sliding scale   SubCutaneous every 6 hours  levETIRAcetam 1500 milliGRAM(s) Oral two times a day  LORazepam   Injectable 0.5 milliGRAM(s) IV Push once  metoprolol tartrate 25 milliGRAM(s) Oral two times a day  polyethylene glycol 3350 17 Gram(s) Oral daily  senna 2 Tablet(s) Oral at bedtime  silver sulfADIAZINE 1% Cream 1 Application(s) Topical two times a day    MEDICATIONS  (PRN):  acetaminophen     Tablet .. 650 milliGRAM(s) Oral every 6 hours PRN Temp greater or equal to 38C (100.4F), Mild Pain (1 - 3)  albuterol    90 MICROgram(s) HFA Inhaler 2 Puff(s) Inhalation every 6 hours PRN Shortness of Breath and/or Wheezing  aluminum hydroxide/magnesium hydroxide/simethicone Suspension 30 milliLiter(s) Oral every 4 hours PRN Dyspepsia      Diet, NPO with Tube Feed:   Tube Feeding Modality: Gastrostomy  Glucerna 1.2 Pedrito  Total Volume for 24 Hours (mL): 1440  Continuous  Until Goal Tube Feed Rate (mL per Hour): 60  Tube Feed Duration (in Hours): 24  Tube Feed Start Time: 12:15  Free Water Flush Instructions:  automatic water flus via pump @ 20 ml/hr (12-23-22 @ 12:08) [Active]          Vital Signs Last 24 Hrs  T(C): 37.1 (03 Jan 2023 05:10), Max: 37.5 (02 Jan 2023 23:45)  T(F): 98.8 (03 Jan 2023 05:10), Max: 99.5 (02 Jan 2023 23:45)  HR: 81 (03 Jan 2023 05:40) (66 - 85)  BP: 132/76 (03 Jan 2023 05:10) (117/74 - 136/74)  BP(mean): --  RR: 18 (02 Jan 2023 16:00) (18 - 20)  SpO2: 100% (03 Jan 2023 05:40) (97% - 100%)          01-02-23 @ 07:01  -  01-03-23 @ 07:00  --------------------------------------------------------  IN: 0 mL / OUT: 1 mL / NET: -1 mL        Mode: CPAP with PS, FiO2: 30, PEEP: 10, PS: 5, ITime: 1, MAP: 8, PIP: 24      LABS:                    WBC:  WBC Count: 6.06 K/uL (01-01 @ 05:57)      MICROBIOLOGY:  RECENT CULTURES:                  Sodium:  Sodium, Serum: 139 mmol/L (01-01 @ 05:57)      0.81 mg/dL 01-01 @ 05:57      Hemoglobin:  Hemoglobin: 9.9 g/dL (01-01 @ 05:57)      Platelets: Platelet Count - Automated: 168 K/uL (01-01 @ 05:57)              RADIOLOGY & ADDITIONAL STUDIES:      MICROBIOLOGY:  RECENT CULTURES:

## 2023-01-03 NOTE — PROGRESS NOTE ADULT - ASSESSMENT
REVIEW OF SYMPTOMS      Able to give (reliable) ROS  NO     PHYSICAL EXAM    HEENT Unremarkable  atraumatic   RESP Fair air entry EXP prolonged    Harsh breath sound Resp distres mild   CARDIAC S1 S2 No S3     NO JVD    ABDOMEN SOFT BS PRESENT NOT DISTENDED No hepatosplenomegaly   PEDAL EDEMA present No calf tenderness  NO rash       GENERAL DATA .   GOC.  12/11/2022 full code       ALLGY.  nka                            WT. ..  12/2/2022 86  BMI. ..    12/2/2022 29                          ICU STAY.  .. 11/29-12/9  COVID.   .. 12/2/2022 scv2 (-)   .. 11/20/2022 scv2 (-)   BEST PRACTICE ISSUES.    HOB ELEVATN. Yes  DVT PPLX.  1/3/2023 apixa 5.2 (vte)  12/12 l Subclav throm  ALEJO PPLX.   ..      INFN PPLX.   .. 11/25 chlorhex .12%   .. 11/14 chlorhex 2%   SP SW ANTWAN.         DIET.    ..  12/15 glucerna 1.2 1440 gt   IV fl.  ..            PROCEDURES.  .. 12/19 debridement of sacral wound   .. 12/5/2022 trach  .. 12/5/2022 peg   .. 12/12 r arm midline       ABGS.  12/3/2022 ac 14/450/.3/5 750/46/75      VS/ PO/IO/ VENT/ DRIPS.  1/3/2023 afeb 60 120/60   1/3/2023 12p cpap 5/10/30 3 369 rsbi 86     MAIN ISSUES.  72 m doa 11/14/2022 cac  PMH CAD s/p PCI with stent 2015 and CABG 2014,   PMH  s/p medtronic PPM,   PMH CVA (lacunar infarct)    1) PEA arrest with ROSC after approximately 5 min 11/14  Now communicative   2) DVT 12/12 l Subclav thromS 12/15/2022 lvnx 80.2  3) TRACH 12/5/2022 trach  4) PEG12/5/2022 peg   5) Cellulitis hand absc 12/13 mod enterococcus fecalis  staph epi 12/12-12/15/2022  cephalexin 12/15 vanco once  12/16-12/19 zosyn  6) Vent weaning       PROBLEM ASSESSMENT RECOMMENDATIONS..  Sp  cac 11/14/2022   .. Patient is interactive and answers questions appropriately as dw son on 12/13/2022   VTE.  .. 12/12/2022 Pt found to have l subclav dvt    12/12/2022 iv ufh startd   .. 12/15/2022 changed to lvnx   Trach 12/5   .. trach care   COPD.  .. 12/22/2022 symbicort 80 DCed  .. 12/30 symbicort 160   vent management.  .. vent bundle dsbt dsv target pplat 30 (-) PO2 60 (+) pH 730 (+)   weaming.  .. 12/13-12/14-12/16/2022 tolerated cpap 12/13-12/14-12/16/20 rsbi 78-94-71  .. 12/15/2022 tolerated cpap 4 h  .. 12/18/2022 dw rt Pt not tolerating cpap gets agitated after few min on 12/17    .. 12/22/2022 dw resp and with pt son bedside Pt has not been tolerating cpap   .. 12/24/2022 dw rt Try cpap 5 ps5 and if toerates will place on tc 12/25 12/25/2022 failed cpap 5/5/ on 12/24 1/2/2023 dw rt and with son bedside continue cpap trials to trach collar  Infection.  ..  Monitor for vap    cellulitis hand   .. w 12/24-12/27/2022 w 8.9- 8.5    .. absc 12/13 mod enterococcus fecalis  staph epi   .. sp course of abio   CAD.  .. 11/14 asa 81   .. 12/13 metoprolol 25.2   .. Monitor   CHF.   .. 11/14/2022 echo mod decr segmental lvsf apical lateral apiccal ant segment are abn takotsubo cmpthy pasp 42 .  .. monitor for chf   Dysphagia.  .. 12/24/2022 speech eval requestd   anemia.  .. Hb 12/16-12/17-12/18-12/19-12/20-12/23-12/-12/27/2022   Hb 8.9 - 9.5- 8.9  - 9.2 - 9.1 - 8.8 - 9.9 - 10   .. Monitor  Target Hb 7 (+)   DECUB.  .. 12/2 collagenase   SEIZURES.  .. sz meds as guided by neuro     TIME SPENT   Over 25 minutes aggregate care time spent on encounter; activities included   direct patient care, counseling and/or coordinating care reviewing notes, lab data/ imaging , discussion with multidisciplinary team/ patient  /family and explaining in detail risks, benefits, alternatives  of the recommendations     MARQUISE TAYLOR 72 m 11/14/2022 1948 Dr Leela Dowling

## 2023-01-03 NOTE — PROGRESS NOTE ADULT - SUBJECTIVE AND OBJECTIVE BOX
CHIEF COMPLAINT: Follow up of PEA arrest, chronic respiratory failure and seizure.   SBT per pulmonologist. no fever   no cp   on 10/5/30%  CPAP trials to collar ongoing by pulm and respiratory therapist   no new overnight complaints.       PHYSICAL EXAM:    GENERAL: Obese, trach 10/5/30%  CHEST/LUNG: Decreased air entry bibasally. no wheezing. s/p trach   HEART: Regular rate and rhythm; + murmur  ABDOMEN: Soft, Nontender, Nondistended; Bowel sounds present.s/p PEG  EXTREMITIES:  Moving all four extremities spontaneously, No clubbing, cyanosis, or edema. localized left hand dorsal surface non tender likely chronic hematoma mass.   NERVOUS SYSTEM:  Grossly non focal. followed simple commands.   Psychiatry: AAO x 3, mood is appropriate       OBJECTIVE DATA:     Vital Signs Last 24 Hrs  T(C): 36.7 (03 Jan 2023 10:44), Max: 37.5 (02 Jan 2023 23:45)  T(F): 98 (03 Jan 2023 10:44), Max: 99.5 (02 Jan 2023 23:45)  HR: 60 (03 Jan 2023 12:03) (60 - 85)  BP: 129/63 (03 Jan 2023 10:44) (117/74 - 132/76)  BP(mean): --  RR: 20 (03 Jan 2023 10:44) (18 - 20)  SpO2: 100% (03 Jan 2023 12:03) (99% - 100%)      CAPILLARY BLOOD GLUCOSE      POCT Blood Glucose.: 145 mg/dL (03 Jan 2023 11:42)       MEDICATIONS  (STANDING):  aspirin  chewable 81 milliGRAM(s) Oral daily  atorvastatin 20 milliGRAM(s) Oral at bedtime  bacitracin   Ointment 1 Application(s) Topical two times a day  budesonide 160 MICROgram(s)/formoterol 4.5 MICROgram(s) Inhaler 2 Puff(s) Inhalation two times a day  chlorhexidine 0.12% Liquid 15 milliLiter(s) Oral Mucosa every 12 hours  chlorhexidine 2% Cloths 1 Application(s) Topical <User Schedule>  collagenase Ointment 1 Application(s) Topical two times a day  cyanocobalamin 1000 MICROGram(s) Oral daily  enoxaparin Injectable 80 milliGRAM(s) SubCutaneous every 12 hours  folic acid 1 milliGRAM(s) Oral daily  insulin glargine Injectable (LANTUS) 20 Unit(s) SubCutaneous every morning  insulin lispro (ADMELOG) corrective regimen sliding scale   SubCutaneous every 6 hours  levETIRAcetam 1500 milliGRAM(s) Oral two times a day  LORazepam   Injectable 0.5 milliGRAM(s) IV Push once  metoprolol tartrate 25 milliGRAM(s) Oral two times a day  polyethylene glycol 3350 17 Gram(s) Oral daily  senna 2 Tablet(s) Oral at bedtime  silver sulfADIAZINE 1% Cream 1 Application(s) Topical two times a day    MEDICATIONS  (PRN):  acetaminophen     Tablet .. 650 milliGRAM(s) Oral every 6 hours PRN Temp greater or equal to 38C (100.4F), Mild Pain (1 - 3)  albuterol    90 MICROgram(s) HFA Inhaler 2 Puff(s) Inhalation every 6 hours PRN Shortness of Breath and/or Wheezing  aluminum hydroxide/magnesium hydroxide/simethicone Suspension 30 milliLiter(s) Oral every 4 hours PRN Dyspepsia

## 2023-01-03 NOTE — PROGRESS NOTE ADULT - ASSESSMENT
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 Unknown how long pt unresponsive for at home. Blood sugar in the 40s with EMS s/p glucagon. On arrival to ER pt hypothermic and agonally breathing. Pt became unresponsive with loss of pulse; ACLS initiated. PEA arrest with ROSC after approximately 5 min s/p Epi x2.  Pt intubated during CODE BLUE. Per son pt on the day prior to presentation reported low blood sugar reads on his glucometer in the range of 80s. Son believes pt continued to take his insulin and oral diabetic regimen. Son states that pt was eating but may have been eating less in the last few days. Later on pt noted to have tonic-clonic Sz.     Problem/Plan-1:  Problem: PEA arrest;s/p Epi x 2. ROSC achieved  CAD/ CHF history:  Takotsubo cardiomyopathy found on Echo   stable  cont current meds. entresto at discharge per cards.     Problem/Plan-2:  Problem: Acute hypoxemic respiratory failure now chronic  s/p intubation , failed extubation, now trach to vent  c/w vent managment and SBT trials to collar.    cont current bronchodilators. discussed with pulmonologist     Problem/Plan-3:  Problem: LUE VTE with enterococcal faecalis cellulitis /infected left hand hematoma s/p bedside debridement 12/13   LUE edema and wound of left hand ; good radial pulse   --LUE venous duplex : occlusive thrombus in the left mid subclavian vein with partial   slow flow detected in the lateral subclavian vein.  --LUE arterial Duplex neg   --change lovenox to eliquis 5 mg bid.   --12/13 Hand surgery consult appreciated, s/p bedside debridement.   --antibiotic course completed       Problem/Plan-5:  Problem: New onset Seizure likely due to anoxic brain injury  cont keppra as per colleague discussion with Dr. Loo.       Problem/Plan-6:  Problem: ELMER 2 to ATN  ; resolved    Problem/Plan-7:  Problem: Shock liver.    due to PEA arrest. resolved    Problem/Plan-8:  Problem:DM2   A1c 8.8%  continue with current management   FS goal 140-180   continue with PEG feeds     Problem/Plan-9:  Problem: Hypophosphatemia --repleted     Problem/Plan-10:  Problem: Ustageable sacral pressure wound   s/p debridement 12/19    Problem/Plan-11:  Problem: Normocytic anemia   no e/o bleeding or bruising   H/H stable   low normal Vit B12 and folic acid --supplement       Moderate PCM and dysphagia: PEG tube feeding.     Preventative measures   dvt ppx>>>Lovenox  fall, aspiration  precautions  Dipso: Patient has no insurance and Family in process of getting guardianship. Patient will then need placement.

## 2023-01-04 LAB
GLUCOSE BLDC GLUCOMTR-MCNC: 182 MG/DL — HIGH (ref 70–99)
GLUCOSE BLDC GLUCOMTR-MCNC: 187 MG/DL — HIGH (ref 70–99)
GLUCOSE BLDC GLUCOMTR-MCNC: 187 MG/DL — HIGH (ref 70–99)
GLUCOSE BLDC GLUCOMTR-MCNC: 190 MG/DL — HIGH (ref 70–99)
GLUCOSE BLDC GLUCOMTR-MCNC: 213 MG/DL — HIGH (ref 70–99)

## 2023-01-04 PROCEDURE — 99232 SBSQ HOSP IP/OBS MODERATE 35: CPT

## 2023-01-04 PROCEDURE — 99233 SBSQ HOSP IP/OBS HIGH 50: CPT

## 2023-01-04 RX ADMIN — Medication 81 MILLIGRAM(S): at 11:36

## 2023-01-04 RX ADMIN — Medication 1 APPLICATION(S): at 05:41

## 2023-01-04 RX ADMIN — Medication 2: at 14:01

## 2023-01-04 RX ADMIN — Medication 1 MILLIGRAM(S): at 11:36

## 2023-01-04 RX ADMIN — Medication 1 APPLICATION(S): at 05:37

## 2023-01-04 RX ADMIN — CHLORHEXIDINE GLUCONATE 1 APPLICATION(S): 213 SOLUTION TOPICAL at 05:36

## 2023-01-04 RX ADMIN — LEVETIRACETAM 1500 MILLIGRAM(S): 250 TABLET, FILM COATED ORAL at 05:37

## 2023-01-04 RX ADMIN — ALBUTEROL 2 PUFF(S): 90 AEROSOL, METERED ORAL at 05:40

## 2023-01-04 RX ADMIN — Medication 2: at 05:40

## 2023-01-04 RX ADMIN — APIXABAN 5 MILLIGRAM(S): 2.5 TABLET, FILM COATED ORAL at 17:50

## 2023-01-04 RX ADMIN — ATORVASTATIN CALCIUM 20 MILLIGRAM(S): 80 TABLET, FILM COATED ORAL at 20:37

## 2023-01-04 RX ADMIN — POLYETHYLENE GLYCOL 3350 17 GRAM(S): 17 POWDER, FOR SOLUTION ORAL at 11:37

## 2023-01-04 RX ADMIN — Medication 1 APPLICATION(S): at 17:50

## 2023-01-04 RX ADMIN — CHLORHEXIDINE GLUCONATE 15 MILLILITER(S): 213 SOLUTION TOPICAL at 17:50

## 2023-01-04 RX ADMIN — INSULIN GLARGINE 20 UNIT(S): 100 INJECTION, SOLUTION SUBCUTANEOUS at 08:09

## 2023-01-04 RX ADMIN — Medication 1 APPLICATION(S): at 17:52

## 2023-01-04 RX ADMIN — Medication 2: at 17:51

## 2023-01-04 RX ADMIN — APIXABAN 5 MILLIGRAM(S): 2.5 TABLET, FILM COATED ORAL at 05:38

## 2023-01-04 RX ADMIN — Medication 1 APPLICATION(S): at 17:51

## 2023-01-04 RX ADMIN — PREGABALIN 1000 MICROGRAM(S): 225 CAPSULE ORAL at 11:36

## 2023-01-04 RX ADMIN — BUDESONIDE AND FORMOTEROL FUMARATE DIHYDRATE 2 PUFF(S): 160; 4.5 AEROSOL RESPIRATORY (INHALATION) at 17:20

## 2023-01-04 RX ADMIN — CHLORHEXIDINE GLUCONATE 15 MILLILITER(S): 213 SOLUTION TOPICAL at 05:36

## 2023-01-04 RX ADMIN — SENNA PLUS 2 TABLET(S): 8.6 TABLET ORAL at 20:37

## 2023-01-04 RX ADMIN — BUDESONIDE AND FORMOTEROL FUMARATE DIHYDRATE 2 PUFF(S): 160; 4.5 AEROSOL RESPIRATORY (INHALATION) at 13:01

## 2023-01-04 RX ADMIN — Medication 25 MILLIGRAM(S): at 17:50

## 2023-01-04 RX ADMIN — Medication 25 MILLIGRAM(S): at 05:36

## 2023-01-04 RX ADMIN — LEVETIRACETAM 1500 MILLIGRAM(S): 250 TABLET, FILM COATED ORAL at 17:50

## 2023-01-04 NOTE — PROGRESS NOTE ADULT - ASSESSMENT
REVIEW OF SYMPTOMS      Able to give (reliable) ROS  NO     PHYSICAL EXAM    HEENT Unremarkable  atraumatic   RESP Fair air entry EXP prolonged    Harsh breath sound Resp distres mild   CARDIAC S1 S2 No S3     NO JVD    ABDOMEN SOFT BS PRESENT NOT DISTENDED No hepatosplenomegaly   PEDAL EDEMA present No calf tenderness  NO rash       GENERAL DATA .   GOC.  12/11/2022 full code       ALLGY.  nka                            WT. ..  12/2/2022 86  BMI. ..    12/2/2022 29                          ICU STAY.  .. 11/29-12/9  COVID.   .. 12/2/2022 scv2 (-)   .. 11/20/2022 scv2 (-)   BEST PRACTICE ISSUES.    HOB ELEVATN. Yes  DVT PPLX.  1/3/2023 apixa 5.2 (vte)  12/12 l Subclav throm  ALEJO PPLX.   ..      INFN PPLX.   .. 11/25 chlorhex .12%   .. 11/14 chlorhex 2%   SP SW ANTWAN.         DIET.    ..  12/15 glucerna 1.2 1440 gt   IV fl.  ..            PROCEDURES.  .. 12/19 debridement of sacral wound   .. 12/5/2022 trach  .. 12/5/2022 peg   .. 12/12 r arm midline       ABGS.  12/3/2022 ac 14/450/.3/5 750/46/75      VS/ PO/IO/ VENT/ DRIPS.  1/4/2023 99f 80 90/60   1/4/2023 ac 14/450/5/.3     MAIN ISSUES.  72 m doa 11/14/2022 cac  PMH CAD s/p PCI with stent 2015 and CABG 2014,   PMH  s/p medtronic PPM,   PMH CVA (lacunar infarct)    1) PEA arrest with ROSC after approximately 5 min 11/14  Now communicative   2) DVT 12/12 l Subclav thromS 12/15/2022 lvnx 80.2 1/3 changed to apixaban 5.2  3) TRACH 12/5/2022 trach  4) PEG12/5/2022 peg   5) Cellulitis hand absc 12/13 mod enterococcus fecalis  staph epi 12/12-12/15/2022  cephalexin 12/15 vanco once  12/16-12/19 zosyn  6) Vent weaning     OVERALL PLAN.  wean as tolerated   If able to tolerate cpap 5/5 x 2 h will place on tc   On Rx for VTE   Monitor for chf has chr hfref per echo   continue meds for copd   sp cac good neuro recovery awake alert interactive       TIME SPENT   Over 25 minutes aggregate care time spent on encounter; activities included   direct patient care, counseling and/or coordinating care reviewing notes, lab data/ imaging , discussion with multidisciplinary team/ patient  /family and explaining in detail risks, benefits, alternatives  of the recommendations     MARQUISE TAYLOR 72 m 11/14/2022 1948 Dr Leela Dowling

## 2023-01-04 NOTE — PROGRESS NOTE ADULT - SUBJECTIVE AND OBJECTIVE BOX
Patient is a 72y old  Male who presents with a chief complaint of s/p cardiac arrest, hypoglycemia (04 Jan 2023 07:05)      INTERVAL HPI/OVERNIGHT EVENTS:  Pt was seen and examined, no acute events.      MEDICATIONS  (STANDING):  apixaban 5 milliGRAM(s) Oral every 12 hours  aspirin  chewable 81 milliGRAM(s) Oral daily  atorvastatin 20 milliGRAM(s) Oral at bedtime  bacitracin   Ointment 1 Application(s) Topical two times a day  budesonide 160 MICROgram(s)/formoterol 4.5 MICROgram(s) Inhaler 2 Puff(s) Inhalation two times a day  chlorhexidine 0.12% Liquid 15 milliLiter(s) Oral Mucosa every 12 hours  chlorhexidine 2% Cloths 1 Application(s) Topical <User Schedule>  collagenase Ointment 1 Application(s) Topical two times a day  cyanocobalamin 1000 MICROGram(s) Oral daily  folic acid 1 milliGRAM(s) Oral daily  insulin glargine Injectable (LANTUS) 20 Unit(s) SubCutaneous every morning  insulin lispro (ADMELOG) corrective regimen sliding scale   SubCutaneous every 6 hours  levETIRAcetam 1500 milliGRAM(s) Oral two times a day  LORazepam   Injectable 0.5 milliGRAM(s) IV Push once  metoprolol tartrate 25 milliGRAM(s) Oral two times a day  polyethylene glycol 3350 17 Gram(s) Oral daily  senna 2 Tablet(s) Oral at bedtime  silver sulfADIAZINE 1% Cream 1 Application(s) Topical two times a day    MEDICATIONS  (PRN):  acetaminophen     Tablet .. 650 milliGRAM(s) Oral every 6 hours PRN Temp greater or equal to 38C (100.4F), Mild Pain (1 - 3)  albuterol    90 MICROgram(s) HFA Inhaler 2 Puff(s) Inhalation every 6 hours PRN Shortness of Breath and/or Wheezing  aluminum hydroxide/magnesium hydroxide/simethicone Suspension 30 milliLiter(s) Oral every 4 hours PRN Dyspepsia      Allergies  No Known Allergies        Vital Signs Last 24 Hrs  T(C): 37.4 (04 Jan 2023 16:30), Max: 37.4 (03 Jan 2023 23:34)  T(F): 99.3 (04 Jan 2023 16:30), Max: 99.4 (03 Jan 2023 23:34)  HR: 82 (04 Jan 2023 16:51) (68 - 86)  BP: 98/61 (04 Jan 2023 16:30) (98/61 - 108/67)  BP(mean): --  RR: 18 (04 Jan 2023 16:30) (18 - 20)  SpO2: 100% (04 Jan 2023 16:51) (97% - 100%)    Parameters below as of 04 Jan 2023 05:48  Patient On (Oxygen Delivery Method): ventilator        PHYSICAL EXAM:  GENERAL: Obese, trach 10/5/30%  CHEST/LUNG: Decreased air entry bibasally. no wheezing. s/p trach   HEART: Regular rate and rhythm; + murmur  ABDOMEN: Soft, Nontender, Nondistended; Bowel sounds present.s/p PEG  EXTREMITIES:  Moving all four extremities spontaneously, No clubbing, cyanosis, or edema. localized left hand dorsal surface non tender likely chronic hematoma mass.   NERVOUS SYSTEM:  Grossly non focal. followed simple commands.   Psychiatry: AAO x 3, mood is appropriate         LABS:        CAPILLARY BLOOD GLUCOSE      POCT Blood Glucose.: 187 mg/dL (04 Jan 2023 16:54)  POCT Blood Glucose.: 182 mg/dL (04 Jan 2023 13:58)  POCT Blood Glucose.: 187 mg/dL (04 Jan 2023 11:51)  POCT Blood Glucose.: 213 mg/dL (04 Jan 2023 07:44)  POCT Blood Glucose.: 190 mg/dL (04 Jan 2023 05:34)  POCT Blood Glucose.: 132 mg/dL (03 Jan 2023 23:41)      RADIOLOGY & ADDITIONAL TESTS:    Imaging Personally Reviewed:  [ ] YES  [ ] NO    Consultant(s) Notes Reviewed:  [ ] YES  [ ] NO    Care Discussed with Consultants/Other Providers [ ] YES  [ ] NO

## 2023-01-04 NOTE — PROGRESS NOTE ADULT - SUBJECTIVE AND OBJECTIVE BOX
BRANDON CAMARILLO    [facility] [address]    Allergies    No Known Allergies    Intolerances        PAST MEDICAL & SURGICAL HISTORY:  HTN (hypertension)      HLD (hyperlipidemia)      Diabetes mellitus      Lacunar infarction      BPH (benign prostatic hyperplasia)      CHF (congestive heart failure)      Chronic obstructive pulmonary disease, unspecified COPD type      S/P CABG (coronary artery bypass graft)  2014          FAMILY HISTORY:      Home Medications:  aspirin 81 mg oral delayed release tablet: 1 tab(s) orally once a day (12 Jun 2020 17:35)  Liquifilm Tears preserved ophthalmic solution: 1 drop(s) to each affected eye 2 times a day (12 Jun 2020 17:35)      MEDICATIONS  (STANDING):  apixaban 5 milliGRAM(s) Oral every 12 hours  aspirin  chewable 81 milliGRAM(s) Oral daily  atorvastatin 20 milliGRAM(s) Oral at bedtime  bacitracin   Ointment 1 Application(s) Topical two times a day  budesonide 160 MICROgram(s)/formoterol 4.5 MICROgram(s) Inhaler 2 Puff(s) Inhalation two times a day  chlorhexidine 0.12% Liquid 15 milliLiter(s) Oral Mucosa every 12 hours  chlorhexidine 2% Cloths 1 Application(s) Topical <User Schedule>  collagenase Ointment 1 Application(s) Topical two times a day  cyanocobalamin 1000 MICROGram(s) Oral daily  folic acid 1 milliGRAM(s) Oral daily  insulin glargine Injectable (LANTUS) 20 Unit(s) SubCutaneous every morning  insulin lispro (ADMELOG) corrective regimen sliding scale   SubCutaneous every 6 hours  levETIRAcetam 1500 milliGRAM(s) Oral two times a day  LORazepam   Injectable 0.5 milliGRAM(s) IV Push once  metoprolol tartrate 25 milliGRAM(s) Oral two times a day  polyethylene glycol 3350 17 Gram(s) Oral daily  senna 2 Tablet(s) Oral at bedtime  silver sulfADIAZINE 1% Cream 1 Application(s) Topical two times a day    MEDICATIONS  (PRN):  acetaminophen     Tablet .. 650 milliGRAM(s) Oral every 6 hours PRN Temp greater or equal to 38C (100.4F), Mild Pain (1 - 3)  albuterol    90 MICROgram(s) HFA Inhaler 2 Puff(s) Inhalation every 6 hours PRN Shortness of Breath and/or Wheezing  aluminum hydroxide/magnesium hydroxide/simethicone Suspension 30 milliLiter(s) Oral every 4 hours PRN Dyspepsia      Diet, NPO with Tube Feed:   Tube Feeding Modality: Gastrostomy  Glucerna 1.2 Pedrito  Total Volume for 24 Hours (mL): 1440  Continuous  Until Goal Tube Feed Rate (mL per Hour): 60  Tube Feed Duration (in Hours): 24  Tube Feed Start Time: 12:15  Free Water Flush Instructions:  automatic water flus via pump @ 20 ml/hr (12-23-22 @ 12:08) [Active]          Vital Signs Last 24 Hrs  T(C): 36.7 (04 Jan 2023 04:30), Max: 37.4 (03 Jan 2023 23:34)  T(F): 98.1 (04 Jan 2023 04:30), Max: 99.4 (03 Jan 2023 23:34)  HR: 78 (04 Jan 2023 05:48) (60 - 88)  BP: 100/64 (04 Jan 2023 04:30) (100/64 - 129/63)  BP(mean): --  RR: 18 (04 Jan 2023 04:30) (18 - 20)  SpO2: 99% (04 Jan 2023 05:48) (98% - 100%)    Parameters below as of 04 Jan 2023 05:48  Patient On (Oxygen Delivery Method): ventilator            Mode: AC/ CMV (Assist Control/ Continuous Mandatory Ventilation), RR (machine): 14, TV (machine): 450, FiO2: 30, PEEP: 5, ITime: 9, MAP: 13, PIP: 21      LABS:                    WBC:  WBC Count: 6.06 K/uL (01-01 @ 05:57)      MICROBIOLOGY:  RECENT CULTURES:                  Sodium:  Sodium, Serum: 139 mmol/L (01-01 @ 05:57)      0.81 mg/dL 01-01 @ 05:57      Hemoglobin:  Hemoglobin: 9.9 g/dL (01-01 @ 05:57)      Platelets: Platelet Count - Automated: 168 K/uL (01-01 @ 05:57)              RADIOLOGY & ADDITIONAL STUDIES:      MICROBIOLOGY:  RECENT CULTURES:

## 2023-01-05 LAB
GLUCOSE BLDC GLUCOMTR-MCNC: 124 MG/DL — HIGH (ref 70–99)
GLUCOSE BLDC GLUCOMTR-MCNC: 153 MG/DL — HIGH (ref 70–99)
GLUCOSE BLDC GLUCOMTR-MCNC: 155 MG/DL — HIGH (ref 70–99)
GLUCOSE BLDC GLUCOMTR-MCNC: 168 MG/DL — HIGH (ref 70–99)
GLUCOSE BLDC GLUCOMTR-MCNC: 176 MG/DL — HIGH (ref 70–99)

## 2023-01-05 PROCEDURE — 99233 SBSQ HOSP IP/OBS HIGH 50: CPT

## 2023-01-05 PROCEDURE — 99232 SBSQ HOSP IP/OBS MODERATE 35: CPT

## 2023-01-05 RX ADMIN — APIXABAN 5 MILLIGRAM(S): 2.5 TABLET, FILM COATED ORAL at 17:58

## 2023-01-05 RX ADMIN — APIXABAN 5 MILLIGRAM(S): 2.5 TABLET, FILM COATED ORAL at 05:54

## 2023-01-05 RX ADMIN — Medication 1 MILLIGRAM(S): at 11:32

## 2023-01-05 RX ADMIN — Medication 650 MILLIGRAM(S): at 22:30

## 2023-01-05 RX ADMIN — CHLORHEXIDINE GLUCONATE 15 MILLILITER(S): 213 SOLUTION TOPICAL at 17:54

## 2023-01-05 RX ADMIN — BUDESONIDE AND FORMOTEROL FUMARATE DIHYDRATE 2 PUFF(S): 160; 4.5 AEROSOL RESPIRATORY (INHALATION) at 17:05

## 2023-01-05 RX ADMIN — INSULIN GLARGINE 20 UNIT(S): 100 INJECTION, SOLUTION SUBCUTANEOUS at 08:41

## 2023-01-05 RX ADMIN — Medication 1 APPLICATION(S): at 17:59

## 2023-01-05 RX ADMIN — Medication 81 MILLIGRAM(S): at 11:40

## 2023-01-05 RX ADMIN — BUDESONIDE AND FORMOTEROL FUMARATE DIHYDRATE 2 PUFF(S): 160; 4.5 AEROSOL RESPIRATORY (INHALATION) at 05:30

## 2023-01-05 RX ADMIN — Medication 2: at 05:56

## 2023-01-05 RX ADMIN — Medication 2: at 00:24

## 2023-01-05 RX ADMIN — ATORVASTATIN CALCIUM 20 MILLIGRAM(S): 80 TABLET, FILM COATED ORAL at 22:30

## 2023-01-05 RX ADMIN — PREGABALIN 1000 MICROGRAM(S): 225 CAPSULE ORAL at 11:34

## 2023-01-05 RX ADMIN — CHLORHEXIDINE GLUCONATE 1 APPLICATION(S): 213 SOLUTION TOPICAL at 05:56

## 2023-01-05 RX ADMIN — Medication 1 APPLICATION(S): at 05:55

## 2023-01-05 RX ADMIN — Medication 2: at 11:23

## 2023-01-05 RX ADMIN — Medication 25 MILLIGRAM(S): at 05:54

## 2023-01-05 RX ADMIN — Medication 1 APPLICATION(S): at 17:54

## 2023-01-05 RX ADMIN — CHLORHEXIDINE GLUCONATE 15 MILLILITER(S): 213 SOLUTION TOPICAL at 05:55

## 2023-01-05 RX ADMIN — LEVETIRACETAM 1500 MILLIGRAM(S): 250 TABLET, FILM COATED ORAL at 05:54

## 2023-01-05 RX ADMIN — Medication 1 APPLICATION(S): at 06:01

## 2023-01-05 RX ADMIN — SENNA PLUS 2 TABLET(S): 8.6 TABLET ORAL at 22:31

## 2023-01-05 RX ADMIN — POLYETHYLENE GLYCOL 3350 17 GRAM(S): 17 POWDER, FOR SOLUTION ORAL at 11:29

## 2023-01-05 RX ADMIN — LEVETIRACETAM 1500 MILLIGRAM(S): 250 TABLET, FILM COATED ORAL at 17:55

## 2023-01-05 NOTE — PROGRESS NOTE ADULT - ASSESSMENT
REVIEW OF SYMPTOMS      Able to give (reliable) ROS  NO     PHYSICAL EXAM    HEENT Unremarkable  atraumatic   RESP Fair air entry EXP prolonged    Harsh breath sound Resp distres mild   CARDIAC S1 S2 No S3     NO JVD    ABDOMEN SOFT BS PRESENT NOT DISTENDED No hepatosplenomegaly   PEDAL EDEMA present No calf tenderness  NO rash       GENERAL DATA .   GOC.  12/11/2022 full code       ALLGY.  nka                            WT. ..  12/2/2022 86  BMI. ..    12/2/2022 29                          ICU STAY.  .. 11/29-12/9  COVID.   .. 12/2/2022 scv2 (-)   .. 11/20/2022 scv2 (-)   BEST PRACTICE ISSUES.    HOB ELEVATN. Yes  DVT PPLX.  1/3/2023 apixa 5.2 (vte)  12/12 l Subclav throm  ALEJO PPLX.   ..      INFN PPLX.   .. 11/25 chlorhex .12%   .. 11/14 chlorhex 2%   SP SW ANTWAN.         DIET.    ..  12/15 glucerna 1.2 1440 gt   IV fl.  ..            PROCEDURES.  .. 12/19 debridement of sacral wound   .. 12/5/2022 trach  .. 12/5/2022 peg   .. 12/12 r arm midline     ABGS.  12/3/2022 ac 14/450/.3/5 750/46/75      VS/ PO/IO/ VENT/ DRIPS.  1/5/2023 afeb 70 100/50   1/5/2023 placed on 35% tc     PATIENT PRESENTATION.  72 m doa 11/14/2022 cac    PMH  PMH CAD s/p PCI with stent 2015 and CABG 2014,   PMH  s/p medtronic PPM,   PMH CVA (lacunar infarct)    HOSPITAL COURSE   .. 1) PEA arrest with ROSC after approximately 5 min 11/14  Now communicative   .. 2) DVT 12/12 l Subclav thromS 12/15/2022 lvnx 80.2 1/3 changed to apixaban 5.2  .. 3) TRACH 12/5/2022 trach  .. 4) PEG12/5/2022 peg   .. 5) Cellulitis hand absc 12/13 mod enterococcus fecalis  staph epi 12/12-12/15/2022  cephalexin 12/15 vanco once  12/16-12/19 zosyn  .. 6) Vent weaning     OVERALL PLAN.  WEANING   .. wean as tolerated  .. 1/4/2022  If able to tolerate cpap 5/5 x 2 h will place on tc   .. 1/5/2023 DW Dr Valdovinos Tried on 35% tc   On Rx for VTE   CHF   .. 11/14/2022 echo mod decr segmental lvsf apical lateral apiccal ant segment are abn takotsubo cmpthy pasp 42 .  .. Monitor for chf has chr hfref per echo   COPD   .. 12/30 symbicort 160   .. continue meds for copd   sp cac   .. good neuro recovery awake alert interactive   VTE  .. 1/3/2023 apixa 5.2 (vte)        TIME SPENT   Over 25 minutes aggregate care time spent on encounter; activities included   direct patient care, counseling and/or coordinating care reviewing notes, lab data/ imaging , discussion with multidisciplinary team/ patient  /family and explaining in detail risks, benefits, alternatives  of the recommendations     MARQUISE TAYLOR 72 m 11/14/2022 1948 Dr Leela Dowling

## 2023-01-05 NOTE — PROGRESS NOTE ADULT - SUBJECTIVE AND OBJECTIVE BOX
Patient is a 72y old  Male who presents with a chief complaint of s/p cardiac arrest, hypoglycemia (05 Jan 2023 16:29)      INTERVAL HPI/OVERNIGHT EVENTS:  Pt was seen and examined, no acute events.      MEDICATIONS  (STANDING):  apixaban 5 milliGRAM(s) Oral every 12 hours  aspirin  chewable 81 milliGRAM(s) Oral daily  atorvastatin 20 milliGRAM(s) Oral at bedtime  bacitracin   Ointment 1 Application(s) Topical two times a day  budesonide 160 MICROgram(s)/formoterol 4.5 MICROgram(s) Inhaler 2 Puff(s) Inhalation two times a day  chlorhexidine 0.12% Liquid 15 milliLiter(s) Oral Mucosa every 12 hours  chlorhexidine 2% Cloths 1 Application(s) Topical <User Schedule>  collagenase Ointment 1 Application(s) Topical two times a day  cyanocobalamin 1000 MICROGram(s) Oral daily  folic acid 1 milliGRAM(s) Oral daily  insulin glargine Injectable (LANTUS) 20 Unit(s) SubCutaneous every morning  insulin lispro (ADMELOG) corrective regimen sliding scale   SubCutaneous every 6 hours  levETIRAcetam 1500 milliGRAM(s) Oral two times a day  LORazepam   Injectable 0.5 milliGRAM(s) IV Push once  metoprolol tartrate 25 milliGRAM(s) Oral two times a day  polyethylene glycol 3350 17 Gram(s) Oral daily  senna 2 Tablet(s) Oral at bedtime  silver sulfADIAZINE 1% Cream 1 Application(s) Topical two times a day    MEDICATIONS  (PRN):  acetaminophen     Tablet .. 650 milliGRAM(s) Oral every 6 hours PRN Temp greater or equal to 38C (100.4F), Mild Pain (1 - 3)  albuterol    90 MICROgram(s) HFA Inhaler 2 Puff(s) Inhalation every 6 hours PRN Shortness of Breath and/or Wheezing  aluminum hydroxide/magnesium hydroxide/simethicone Suspension 30 milliLiter(s) Oral every 4 hours PRN Dyspepsia      Allergies    No Known Allergies    Intolerances          Vital Signs Last 24 Hrs  T(C): 37.1 (05 Jan 2023 11:02), Max: 37.7 (05 Jan 2023 05:24)  T(F): 98.7 (05 Jan 2023 11:02), Max: 99.9 (05 Jan 2023 05:24)  HR: 85 (05 Jan 2023 15:15) (64 - 85)  BP: 121/62 (05 Jan 2023 11:02) (101/62 - 121/62)  BP(mean): --  RR: 18 (05 Jan 2023 15:15) (18 - 18)  SpO2: 99% (05 Jan 2023 15:15) (99% - 100%)    Parameters below as of 05 Jan 2023 15:15  Patient On (Oxygen Delivery Method): tracheostomy collar  O2 Flow (L/min): 10  O2 Concentration (%): 35    PHYSICAL EXAM:  GENERAL: Obese, trach 10/5/30%  CHEST/LUNG: Decreased air entry bibasally. no wheezing. s/p trach   HEART: Regular rate and rhythm; + murmur  ABDOMEN: Soft, Nontender, Nondistended; Bowel sounds present.s/p PEG  EXTREMITIES:  Moving all four extremities spontaneously, No clubbing, cyanosis, or edema. localized left hand dorsal surface non tender likely chronic hematoma mass.   NERVOUS SYSTEM:  Grossly non focal. followed simple commands.   Psychiatry: AAO x 3, mood is appropriate     LABS:              CAPILLARY BLOOD GLUCOSE      POCT Blood Glucose.: 124 mg/dL (05 Jan 2023 17:18)  POCT Blood Glucose.: 176 mg/dL (05 Jan 2023 11:08)  POCT Blood Glucose.: 168 mg/dL (05 Jan 2023 08:05)  POCT Blood Glucose.: 153 mg/dL (05 Jan 2023 05:50)  POCT Blood Glucose.: 155 mg/dL (05 Jan 2023 00:21)      RADIOLOGY & ADDITIONAL TESTS:    Imaging Personally Reviewed:  [ ] YES  [ ] NO    Consultant(s) Notes Reviewed:  [ ] YES  [ ] NO    Care Discussed with Consultants/Other Providers [ ] YES  [ ] NO

## 2023-01-05 NOTE — PROGRESS NOTE ADULT - SUBJECTIVE AND OBJECTIVE BOX
BRANDON CAMARILLO    [facility] [address]    Allergies    No Known Allergies    Intolerances        PAST MEDICAL & SURGICAL HISTORY:  HTN (hypertension)      HLD (hyperlipidemia)      Diabetes mellitus      Lacunar infarction      BPH (benign prostatic hyperplasia)      CHF (congestive heart failure)      Chronic obstructive pulmonary disease, unspecified COPD type      S/P CABG (coronary artery bypass graft)  2014          FAMILY HISTORY:      Home Medications:  aspirin 81 mg oral delayed release tablet: 1 tab(s) orally once a day (12 Jun 2020 17:35)  Liquifilm Tears preserved ophthalmic solution: 1 drop(s) to each affected eye 2 times a day (12 Jun 2020 17:35)      MEDICATIONS  (STANDING):  apixaban 5 milliGRAM(s) Oral every 12 hours  aspirin  chewable 81 milliGRAM(s) Oral daily  atorvastatin 20 milliGRAM(s) Oral at bedtime  bacitracin   Ointment 1 Application(s) Topical two times a day  budesonide 160 MICROgram(s)/formoterol 4.5 MICROgram(s) Inhaler 2 Puff(s) Inhalation two times a day  chlorhexidine 0.12% Liquid 15 milliLiter(s) Oral Mucosa every 12 hours  chlorhexidine 2% Cloths 1 Application(s) Topical <User Schedule>  collagenase Ointment 1 Application(s) Topical two times a day  cyanocobalamin 1000 MICROGram(s) Oral daily  folic acid 1 milliGRAM(s) Oral daily  insulin glargine Injectable (LANTUS) 20 Unit(s) SubCutaneous every morning  insulin lispro (ADMELOG) corrective regimen sliding scale   SubCutaneous every 6 hours  levETIRAcetam 1500 milliGRAM(s) Oral two times a day  LORazepam   Injectable 0.5 milliGRAM(s) IV Push once  metoprolol tartrate 25 milliGRAM(s) Oral two times a day  polyethylene glycol 3350 17 Gram(s) Oral daily  senna 2 Tablet(s) Oral at bedtime  silver sulfADIAZINE 1% Cream 1 Application(s) Topical two times a day    MEDICATIONS  (PRN):  acetaminophen     Tablet .. 650 milliGRAM(s) Oral every 6 hours PRN Temp greater or equal to 38C (100.4F), Mild Pain (1 - 3)  albuterol    90 MICROgram(s) HFA Inhaler 2 Puff(s) Inhalation every 6 hours PRN Shortness of Breath and/or Wheezing  aluminum hydroxide/magnesium hydroxide/simethicone Suspension 30 milliLiter(s) Oral every 4 hours PRN Dyspepsia      Diet, NPO with Tube Feed:   Tube Feeding Modality: Gastrostomy  Glucerna 1.2 Pedrito  Total Volume for 24 Hours (mL): 1440  Continuous  Until Goal Tube Feed Rate (mL per Hour): 60  Tube Feed Duration (in Hours): 24  Tube Feed Start Time: 12:15  Free Water Flush Instructions:  automatic water flus via pump @ 20 ml/hr (12-23-22 @ 12:08) [Active]          Vital Signs Last 24 Hrs  T(C): 37.1 (05 Jan 2023 11:02), Max: 37.7 (05 Jan 2023 05:24)  T(F): 98.7 (05 Jan 2023 11:02), Max: 99.9 (05 Jan 2023 05:24)  HR: 85 (05 Jan 2023 15:15) (64 - 85)  BP: 121/62 (05 Jan 2023 11:02) (101/62 - 121/62)  BP(mean): --  RR: 18 (05 Jan 2023 15:15) (18 - 18)  SpO2: 99% (05 Jan 2023 15:15) (99% - 100%)    Parameters below as of 05 Jan 2023 15:15  Patient On (Oxygen Delivery Method): tracheostomy collar  O2 Flow (L/min): 10  O2 Concentration (%): 35        Mode: standby      LABS:                    WBC:      MICROBIOLOGY:  RECENT CULTURES:                  Sodium:          Hemoglobin:      Platelets:             RADIOLOGY & ADDITIONAL STUDIES:      MICROBIOLOGY:  RECENT CULTURES:

## 2023-01-05 NOTE — PROGRESS NOTE ADULT - ASSESSMENT
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 Unknown how long pt unresponsive for at home. Blood sugar in the 40s with EMS s/p glucagon. On arrival to ER pt hypothermic and agonally breathing. Pt became unresponsive with loss of pulse; ACLS initiated. PEA arrest with ROSC after approximately 5 min s/p Epi x2.  Pt intubated during CODE BLUE. Per son pt on the day prior to presentation reported low blood sugar reads on his glucometer in the range of 80s. Son believes pt continued to take his insulin and oral diabetic regimen. Son states that pt was eating but may have been eating less in the last few days. Later on pt noted to have tonic-clonic Sz.     Problem/Plan-1:  Problem: PEA arrest;s/p Epi x 2. ROSC achieved  CAD/ CHF history:  Takotsubo cardiomyopathy found on Echo   stable  cont current meds. entresto at discharge per cards.     Problem/Plan-2:  Problem: Acute hypoxemic respiratory failure now chronic  s/p intubation , failed extubation, now trach to vent  c/w vent managment, trach collar in daytime   cont current bronchodilators. discussed with pulmonologist     Problem/Plan-3:  Problem: LUE VTE with enterococcal faecalis cellulitis /infected left hand hematoma s/p bedside debridement 12/13   LUE edema and wound of left hand ; good radial pulse   --LUE venous duplex : occlusive thrombus in the left mid subclavian vein with partial   slow flow detected in the lateral subclavian vein.  --LUE arterial Duplex neg   --change lovenox to eliquis 5 mg bid.   --12/13 Hand surgery consult appreciated, s/p bedside debridement.   --antibiotic course completed       Problem/Plan-5:  Problem: New onset Seizure likely due to anoxic brain injury  cont keppra as per colleague discussion with Dr. Loo.       Problem/Plan-6:  Problem: ELMER 2 to ATN  ; resolved    Problem/Plan-7:  Problem: Shock liver.    due to PEA arrest. resolved    Problem/Plan-8:  Problem:DM2   A1c 8.8%  continue with current management   FS goal 140-180   continue with PEG feeds     Problem/Plan-9:  Problem: Hypophosphatemia --repleted     Problem/Plan-10:  Problem: Ustageable sacral pressure wound   s/p debridement 12/19    Problem/Plan-11:  Problem: Normocytic anemia   no e/o bleeding or bruising   H/H stable   low normal Vit B12 and folic acid --supplement       Moderate PCM and dysphagia: PEG tube feeding.     Preventative measures   dvt ppx>>>Lovenox  fall, aspiration  precautions  Dipso: Patient has no insurance and Family in process of getting guardianship. Patient will then need placement.

## 2023-01-06 LAB
ALBUMIN SERPL ELPH-MCNC: 2 G/DL — LOW (ref 3.3–5)
ALP SERPL-CCNC: 95 U/L — SIGNIFICANT CHANGE UP (ref 40–120)
ALT FLD-CCNC: 25 U/L — SIGNIFICANT CHANGE UP (ref 12–78)
ANION GAP SERPL CALC-SCNC: 4 MMOL/L — LOW (ref 5–17)
AST SERPL-CCNC: 19 U/L — SIGNIFICANT CHANGE UP (ref 15–37)
BASE EXCESS BLDA CALC-SCNC: 9.4 MMOL/L — HIGH (ref -2–3)
BILIRUB SERPL-MCNC: 0.3 MG/DL — SIGNIFICANT CHANGE UP (ref 0.2–1.2)
BLOOD GAS COMMENTS ARTERIAL: SIGNIFICANT CHANGE UP
BUN SERPL-MCNC: 30 MG/DL — HIGH (ref 7–23)
CALCIUM SERPL-MCNC: 8.7 MG/DL — SIGNIFICANT CHANGE UP (ref 8.5–10.1)
CHLORIDE SERPL-SCNC: 106 MMOL/L — SIGNIFICANT CHANGE UP (ref 96–108)
CO2 BLDA-SCNC: 36 MMOL/L — HIGH (ref 19–24)
CO2 SERPL-SCNC: 33 MMOL/L — HIGH (ref 22–31)
CREAT SERPL-MCNC: 0.82 MG/DL — SIGNIFICANT CHANGE UP (ref 0.5–1.3)
EGFR: 92 ML/MIN/1.73M2 — SIGNIFICANT CHANGE UP
GAS PNL BLDA: SIGNIFICANT CHANGE UP
GLUCOSE BLDC GLUCOMTR-MCNC: 166 MG/DL — HIGH (ref 70–99)
GLUCOSE BLDC GLUCOMTR-MCNC: 174 MG/DL — HIGH (ref 70–99)
GLUCOSE BLDC GLUCOMTR-MCNC: 179 MG/DL — HIGH (ref 70–99)
GLUCOSE BLDC GLUCOMTR-MCNC: 182 MG/DL — HIGH (ref 70–99)
GLUCOSE BLDC GLUCOMTR-MCNC: 187 MG/DL — HIGH (ref 70–99)
GLUCOSE SERPL-MCNC: 158 MG/DL — HIGH (ref 70–99)
HCO3 BLDA-SCNC: 35 MMOL/L — HIGH (ref 21–28)
HCT VFR BLD CALC: 29.5 % — LOW (ref 39–50)
HGB BLD-MCNC: 9.4 G/DL — LOW (ref 13–17)
HOROWITZ INDEX BLDA+IHG-RTO: 30 — SIGNIFICANT CHANGE UP
MCHC RBC-ENTMCNC: 30.6 PG — SIGNIFICANT CHANGE UP (ref 27–34)
MCHC RBC-ENTMCNC: 31.9 G/DL — LOW (ref 32–36)
MCV RBC AUTO: 96.1 FL — SIGNIFICANT CHANGE UP (ref 80–100)
NRBC # BLD: 0 /100 WBCS — SIGNIFICANT CHANGE UP (ref 0–0)
PCO2 BLDA: 49 MMHG — HIGH (ref 32–46)
PH BLDA: 7.46 — HIGH (ref 7.35–7.45)
PLATELET # BLD AUTO: 231 K/UL — SIGNIFICANT CHANGE UP (ref 150–400)
PO2 BLDA: 123 MMHG — HIGH (ref 83–108)
POTASSIUM SERPL-MCNC: 4.3 MMOL/L — SIGNIFICANT CHANGE UP (ref 3.5–5.3)
POTASSIUM SERPL-SCNC: 4.3 MMOL/L — SIGNIFICANT CHANGE UP (ref 3.5–5.3)
PROT SERPL-MCNC: 5.9 GM/DL — LOW (ref 6–8.3)
RBC # BLD: 3.07 M/UL — LOW (ref 4.2–5.8)
RBC # FLD: 14.8 % — HIGH (ref 10.3–14.5)
SAO2 % BLDA: 99.2 % — HIGH (ref 94–98)
SODIUM SERPL-SCNC: 143 MMOL/L — SIGNIFICANT CHANGE UP (ref 135–145)
WBC # BLD: 7.77 K/UL — SIGNIFICANT CHANGE UP (ref 3.8–10.5)
WBC # FLD AUTO: 7.77 K/UL — SIGNIFICANT CHANGE UP (ref 3.8–10.5)

## 2023-01-06 PROCEDURE — 99233 SBSQ HOSP IP/OBS HIGH 50: CPT

## 2023-01-06 PROCEDURE — 99232 SBSQ HOSP IP/OBS MODERATE 35: CPT

## 2023-01-06 RX ORDER — CHLORHEXIDINE GLUCONATE 213 G/1000ML
15 SOLUTION TOPICAL EVERY 12 HOURS
Refills: 0 | Status: DISCONTINUED | OUTPATIENT
Start: 2023-01-06 | End: 2023-01-07

## 2023-01-06 RX ADMIN — LEVETIRACETAM 1500 MILLIGRAM(S): 250 TABLET, FILM COATED ORAL at 17:13

## 2023-01-06 RX ADMIN — Medication 1 APPLICATION(S): at 05:08

## 2023-01-06 RX ADMIN — ATORVASTATIN CALCIUM 20 MILLIGRAM(S): 80 TABLET, FILM COATED ORAL at 21:11

## 2023-01-06 RX ADMIN — Medication 1 APPLICATION(S): at 17:17

## 2023-01-06 RX ADMIN — CHLORHEXIDINE GLUCONATE 15 MILLILITER(S): 213 SOLUTION TOPICAL at 17:13

## 2023-01-06 RX ADMIN — Medication 1 APPLICATION(S): at 17:16

## 2023-01-06 RX ADMIN — Medication 1 APPLICATION(S): at 05:09

## 2023-01-06 RX ADMIN — Medication 650 MILLIGRAM(S): at 08:58

## 2023-01-06 RX ADMIN — Medication 2: at 12:55

## 2023-01-06 RX ADMIN — Medication 2: at 17:13

## 2023-01-06 RX ADMIN — CHLORHEXIDINE GLUCONATE 15 MILLILITER(S): 213 SOLUTION TOPICAL at 05:06

## 2023-01-06 RX ADMIN — Medication 1 MILLIGRAM(S): at 12:54

## 2023-01-06 RX ADMIN — BUDESONIDE AND FORMOTEROL FUMARATE DIHYDRATE 2 PUFF(S): 160; 4.5 AEROSOL RESPIRATORY (INHALATION) at 17:14

## 2023-01-06 RX ADMIN — Medication 25 MILLIGRAM(S): at 05:06

## 2023-01-06 RX ADMIN — LEVETIRACETAM 1500 MILLIGRAM(S): 250 TABLET, FILM COATED ORAL at 05:05

## 2023-01-06 RX ADMIN — CHLORHEXIDINE GLUCONATE 1 APPLICATION(S): 213 SOLUTION TOPICAL at 05:07

## 2023-01-06 RX ADMIN — Medication 1 APPLICATION(S): at 05:07

## 2023-01-06 RX ADMIN — PREGABALIN 1000 MICROGRAM(S): 225 CAPSULE ORAL at 12:53

## 2023-01-06 RX ADMIN — Medication 25 MILLIGRAM(S): at 17:13

## 2023-01-06 RX ADMIN — Medication 81 MILLIGRAM(S): at 12:53

## 2023-01-06 RX ADMIN — Medication 1 APPLICATION(S): at 17:13

## 2023-01-06 RX ADMIN — Medication 2: at 05:50

## 2023-01-06 RX ADMIN — INSULIN GLARGINE 20 UNIT(S): 100 INJECTION, SOLUTION SUBCUTANEOUS at 12:54

## 2023-01-06 RX ADMIN — APIXABAN 5 MILLIGRAM(S): 2.5 TABLET, FILM COATED ORAL at 17:15

## 2023-01-06 RX ADMIN — POLYETHYLENE GLYCOL 3350 17 GRAM(S): 17 POWDER, FOR SOLUTION ORAL at 12:56

## 2023-01-06 RX ADMIN — CHLORHEXIDINE GLUCONATE 15 MILLILITER(S): 213 SOLUTION TOPICAL at 17:28

## 2023-01-06 RX ADMIN — APIXABAN 5 MILLIGRAM(S): 2.5 TABLET, FILM COATED ORAL at 05:06

## 2023-01-06 RX ADMIN — Medication 2: at 01:01

## 2023-01-06 NOTE — PROGRESS NOTE ADULT - SUBJECTIVE AND OBJECTIVE BOX
Patient is a 72y old  Male who presents with a chief complaint of s/p cardiac arrest, hypoglycemia (07 Jan 2023 10:35)      INTERVAL HPI/OVERNIGHT EVENTS:  Pt was seen and examined, no acute events.      MEDICATIONS  (STANDING):  apixaban 5 milliGRAM(s) Oral every 12 hours  aspirin  chewable 81 milliGRAM(s) Oral daily  atorvastatin 20 milliGRAM(s) Oral at bedtime  bacitracin   Ointment 1 Application(s) Topical two times a day  budesonide 160 MICROgram(s)/formoterol 4.5 MICROgram(s) Inhaler 2 Puff(s) Inhalation two times a day  chlorhexidine 0.12% Liquid 15 milliLiter(s) Oral Mucosa every 12 hours  chlorhexidine 2% Cloths 1 Application(s) Topical <User Schedule>  collagenase Ointment 1 Application(s) Topical two times a day  cyanocobalamin 1000 MICROGram(s) Oral daily  diphenhydrAMINE Injectable 50 milliGRAM(s) IV Push once  folic acid 1 milliGRAM(s) Oral daily  glycopyrrolate 1 milliGRAM(s) Oral two times a day  insulin glargine Injectable (LANTUS) 20 Unit(s) SubCutaneous every morning  insulin lispro (ADMELOG) corrective regimen sliding scale   SubCutaneous every 6 hours  levETIRAcetam 1500 milliGRAM(s) Oral two times a day  LORazepam   Injectable 0.5 milliGRAM(s) IV Push once  metoprolol tartrate 25 milliGRAM(s) Oral two times a day  polyethylene glycol 3350 17 Gram(s) Oral daily  senna 2 Tablet(s) Oral at bedtime  silver sulfADIAZINE 1% Cream 1 Application(s) Topical two times a day    MEDICATIONS  (PRN):  acetaminophen     Tablet .. 650 milliGRAM(s) Oral every 6 hours PRN Temp greater or equal to 38C (100.4F), Mild Pain (1 - 3)  albuterol    90 MICROgram(s) HFA Inhaler 2 Puff(s) Inhalation every 6 hours PRN Shortness of Breath and/or Wheezing  aluminum hydroxide/magnesium hydroxide/simethicone Suspension 30 milliLiter(s) Oral every 4 hours PRN Dyspepsia      Allergies  No Known Allergies      Vital Signs Last 24 Hrs  T(C): 36.6 (07 Jan 2023 11:14), Max: 36.9 (07 Jan 2023 05:00)  T(F): 97.9 (07 Jan 2023 11:14), Max: 98.4 (07 Jan 2023 05:00)  HR: 62 (07 Jan 2023 16:43) (62 - 98)  BP: 117/68 (07 Jan 2023 11:14) (106/71 - 150/50)  BP(mean): 83 (07 Jan 2023 05:00) (83 - 83)  RR: 20 (07 Jan 2023 11:14) (18 - 20)  SpO2: 100% (07 Jan 2023 16:43) (97% - 100%)    Parameters below as of 07 Jan 2023 13:55  Patient On (Oxygen Delivery Method): ventilator,35%        PHYSICAL EXAM:  GENERAL: Obese, NAD  CHEST/LUNG: Decreased air entry bibasally. no wheezing. s/p trach , on trach collar today  HEART: Regular rate and rhythm; + murmur  ABDOMEN: Soft, Nontender, Nondistended; Bowel sounds present.s/p PEG  EXTREMITIES:  Moving all four extremities spontaneously, No clubbing, cyanosis, or edema. localized left hand dorsal surface non tender likely chronic hematoma mass.   NERVOUS SYSTEM:  Grossly non focal. followed simple commands.   Psychiatry: AAO x 3, mood is appropriate           LABS:                        9.4    7.77  )-----------( 231      ( 06 Jan 2023 08:23 )             29.5     01-06    143  |  106  |  30<H>  ----------------------------<  158<H>  4.3   |  33<H>  |  0.82    Ca    8.7      06 Jan 2023 08:23    TPro  5.9<L>  /  Alb  2.0<L>  /  TBili  0.3  /  DBili  x   /  AST  19  /  ALT  25  /  AlkPhos  95  01-06        CAPILLARY BLOOD GLUCOSE      POCT Blood Glucose.: 207 mg/dL (07 Jan 2023 17:03)  POCT Blood Glucose.: 147 mg/dL (07 Jan 2023 11:49)  POCT Blood Glucose.: 191 mg/dL (07 Jan 2023 07:56)  POCT Blood Glucose.: 157 mg/dL (07 Jan 2023 05:29)  POCT Blood Glucose.: 136 mg/dL (07 Jan 2023 01:03)      RADIOLOGY & ADDITIONAL TESTS:    Imaging Personally Reviewed:  [ ] YES  [ ] NO    Consultant(s) Notes Reviewed:  [ ] YES  [ ] NO    Care Discussed with Consultants/Other Providers [ ] YES  [ ] NO

## 2023-01-06 NOTE — PROGRESS NOTE ADULT - ASSESSMENT
REVIEW OF SYMPTOMS      Able to give (reliable) ROS  NO     PHYSICAL EXAM    HEENT Unremarkable  atraumatic   RESP Fair air entry EXP prolonged    Harsh breath sound Resp distres mild   CARDIAC S1 S2 No S3     NO JVD    ABDOMEN SOFT BS PRESENT NOT DISTENDED No hepatosplenomegaly   PEDAL EDEMA present No calf tenderness  NO rash       GENERAL DATA .   GOC.  12/11/2022 full code       ALLGY.  nka                            WT. ..  12/2/2022 86  BMI. ..    12/2/2022 29                          ICU STAY.  .. 11/29-12/9  COVID.   .. 12/2/2022 scv2 (-)   .. 11/20/2022 scv2 (-)   BEST PRACTICE ISSUES.    HOB ELEVATN. Yes  DVT PPLX.  1/3/2023 apixa 5.2 (vte)  12/12 l Subclav throm  ALEJO PPLX.   ..      INFN PPLX.   .. 11/25 chlorhex .12%   .. 11/14 chlorhex 2%   SP SW ANTWAN.         DIET.    ..  12/15 glucerna 1.2 1440 gt   IV fl.  ..            PROCEDURES.  .. 12/19 debridement of sacral wound   .. 12/5/2022 trach    ABGS.  1/6/2023 ac 16/450/.3/5 746/49/123   12/3/2022 ac 14/450/.3/5 750/46/75      VS/ PO/IO/ VENT/ DRIPS.  1/6/2023 afeb 70 110/60   1/6/2023 5p tc 35%     PATIENT PRESENTATION.  72 m doa 11/14/2022 cac    PMH  PMH CAD s/p PCI with stent 2015 and CABG 2014,   PMH  s/p medtronic PPM,   PMH CVA (lacunar infarct)    HOSPITAL COURSE   .. 1) PEA arrest with ROSC after approximately 5 min 11/14  Now communicative   .. 2) DVT 12/12 l Subclav thromS 12/15/2022 lvnx 80.2 1/3 changed to apixaban 5.2  .. 3) TRACH 12/5/2022 trach  .. 4) PEG12/5/2022 peg   .. 5) Cellulitis hand absc 12/13 mod enterococcus fecalis  staph epi 12/12-12/15/2022  cephalexin 12/15 vanco once  12/16-12/19 zosyn  .. 6) Vent weaning     OVERALL PLAN.  WEANING   .. wean as tolerated  .. 1/4/2022  If able to tolerate cpap 5/5 x 2 h will place on tc   .. 1/5/2023 JOSE J Valdovinos Tried on 35% tc   On Rx for VTE   CHF   .. 11/14/2022 echo mod decr segmental lvsf apical lateral apiccal ant segment are abn takotsubo cmpthy pasp 42 .  .. Monitor for chf has chr hfref per echo   COPD   .. 12/30 symbicort 160   .. continue bd ics for copd   sp cac   .. good neuro recovery awake alert interactive   VTE  .. 1/3/2023 apixa 5.2 (vte)        TIME SPENT   Over 25 minutes aggregate care time spent on encounter; activities included   direct patient care, counseling and/or coordinating care reviewing notes, lab data/ imaging , discussion with multidisciplinary team/ patient  /family and explaining in detail risks, benefits, alternatives  of the recommendations     MARQUISE TAYLOR 72 m 11/14/2022 1948 Dr Leela Dowling

## 2023-01-06 NOTE — PROGRESS NOTE ADULT - SUBJECTIVE AND OBJECTIVE BOX
BRANDON CAMARILLO    [facility] [address] [address]    Allergies    No Known Allergies    Intolerances        PAST MEDICAL & SURGICAL HISTORY:  HTN (hypertension)      HLD (hyperlipidemia)      Diabetes mellitus      Lacunar infarction      BPH (benign prostatic hyperplasia)      CHF (congestive heart failure)      Chronic obstructive pulmonary disease, unspecified COPD type      S/P CABG (coronary artery bypass graft)  2014          FAMILY HISTORY:      Home Medications:  aspirin 81 mg oral delayed release tablet: 1 tab(s) orally once a day (12 Jun 2020 17:35)  Liquifilm Tears preserved ophthalmic solution: 1 drop(s) to each affected eye 2 times a day (12 Jun 2020 17:35)      MEDICATIONS  (STANDING):  apixaban 5 milliGRAM(s) Oral every 12 hours  aspirin  chewable 81 milliGRAM(s) Oral daily  atorvastatin 20 milliGRAM(s) Oral at bedtime  bacitracin   Ointment 1 Application(s) Topical two times a day  budesonide 160 MICROgram(s)/formoterol 4.5 MICROgram(s) Inhaler 2 Puff(s) Inhalation two times a day  chlorhexidine 0.12% Liquid 15 milliLiter(s) Oral Mucosa every 12 hours  chlorhexidine 2% Cloths 1 Application(s) Topical <User Schedule>  collagenase Ointment 1 Application(s) Topical two times a day  cyanocobalamin 1000 MICROGram(s) Oral daily  folic acid 1 milliGRAM(s) Oral daily  insulin glargine Injectable (LANTUS) 20 Unit(s) SubCutaneous every morning  insulin lispro (ADMELOG) corrective regimen sliding scale   SubCutaneous every 6 hours  levETIRAcetam 1500 milliGRAM(s) Oral two times a day  LORazepam   Injectable 0.5 milliGRAM(s) IV Push once  metoprolol tartrate 25 milliGRAM(s) Oral two times a day  polyethylene glycol 3350 17 Gram(s) Oral daily  senna 2 Tablet(s) Oral at bedtime  silver sulfADIAZINE 1% Cream 1 Application(s) Topical two times a day    MEDICATIONS  (PRN):  acetaminophen     Tablet .. 650 milliGRAM(s) Oral every 6 hours PRN Temp greater or equal to 38C (100.4F), Mild Pain (1 - 3)  albuterol    90 MICROgram(s) HFA Inhaler 2 Puff(s) Inhalation every 6 hours PRN Shortness of Breath and/or Wheezing  aluminum hydroxide/magnesium hydroxide/simethicone Suspension 30 milliLiter(s) Oral every 4 hours PRN Dyspepsia      Diet, NPO with Tube Feed:   Tube Feeding Modality: Gastrostomy  Glucerna 1.2 Pedrito  Total Volume for 24 Hours (mL): 1440  Continuous  Until Goal Tube Feed Rate (mL per Hour): 60  Tube Feed Duration (in Hours): 24  Tube Feed Start Time: 12:15  Free Water Flush Instructions:  automatic water flus via pump @ 20 ml/hr (12-23-22 @ 12:08) [Active]          Vital Signs Last 24 Hrs  T(C): 36.7 (06 Jan 2023 05:23), Max: 37.2 (05 Jan 2023 17:25)  T(F): 98 (06 Jan 2023 05:23), Max: 98.9 (05 Jan 2023 17:25)  HR: 68 (06 Jan 2023 05:23) (63 - 88)  BP: 128/79 (06 Jan 2023 05:23) (100/60 - 128/79)  BP(mean): --  RR: 20 (06 Jan 2023 05:23) (18 - 20)  SpO2: 100% (06 Jan 2023 05:23) (99% - 100%)    Parameters below as of 05 Jan 2023 17:06  Patient On (Oxygen Delivery Method): tracheostomy collar  O2 Flow (L/min): 10  O2 Concentration (%): 35      01-05-23 @ 07:01  -  01-06-23 @ 07:00  --------------------------------------------------------  IN: 0 mL / OUT: 300 mL / NET: -300 mL        Mode: AC/ CMV (Assist Control/ Continuous Mandatory Ventilation), RR (machine): 14, TV (machine): 450, FiO2: 30, PEEP: 5, ITime: 0.8, MAP: 9, PIP: 27      LABS:                ABG - ( 06 Jan 2023 05:00 )  pH, Arterial: 7.46  pH, Blood: x     /  pCO2: 49    /  pO2: 123   / HCO3: 35    / Base Excess: 9.4   /  SaO2: 99.2                WBC:      MICROBIOLOGY:  RECENT CULTURES:                  Sodium:          Hemoglobin:      Platelets:             RADIOLOGY & ADDITIONAL STUDIES:      MICROBIOLOGY:  RECENT CULTURES:

## 2023-01-06 NOTE — PROGRESS NOTE ADULT - ASSESSMENT
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 Unknown how long pt unresponsive for at home. Blood sugar in the 40s with EMS s/p glucagon. On arrival to ER pt hypothermic and agonally breathing. Pt became unresponsive with loss of pulse; ACLS initiated. PEA arrest with ROSC after approximately 5 min s/p Epi x2.  Pt intubated during CODE BLUE. Per son pt on the day prior to presentation reported low blood sugar reads on his glucometer in the range of 80s. Son believes pt continued to take his insulin and oral diabetic regimen. Son states that pt was eating but may have been eating less in the last few days. Later on pt noted to have tonic-clonic Sz.     Problem/Plan-1:  Problem: PEA arrest;s/p Epi x 2. ROSC achieved  CAD/ CHF history:  Takotsubo cardiomyopathy found on Echo   stable  cont current meds. entresto at discharge per cards.     Problem/Plan-2:  Problem: Acute hypoxemic respiratory failure now chronic  s/p intubation , failed extubation, now trach to vent  c/w vent managment, trach collar in daytime   cont current bronchodilators. discussed with pulmonologist     Problem/Plan-3:  Problem: LUE VTE with enterococcal faecalis cellulitis /infected left hand hematoma s/p bedside debridement 12/13   LUE edema and wound of left hand ; good radial pulse   --LUE venous duplex : occlusive thrombus in the left mid subclavian vein with partial   slow flow detected in the lateral subclavian vein.  --LUE arterial Duplex neg   --change lovenox to eliquis 5 mg bid.   --12/13 Hand surgery consult appreciated, s/p bedside debridement.   --antibiotic course completed       Problem/Plan-5:  Problem: New onset Seizure likely due to anoxic brain injury  cont keppra as per colleague discussion with Dr. Loo.       Problem/Plan-6:  Problem: ELMER 2 to ATN  ; resolved    Problem/Plan-7:  Problem: Shock liver.    due to PEA arrest. resolved    Problem/Plan-8:  Problem:DM2   A1c 8.8%  continue with current management   FS goal 140-180   continue with PEG feeds     Problem/Plan-9:  Problem: Hypophosphatemia --repleted     Problem/Plan-10:  Problem: Ustageable sacral pressure wound   s/p debridement 12/19    Problem/Plan-11:  Problem: Normocytic anemia   no e/o bleeding or bruising   H/H stable   low normal Vit B12 and folic acid --supplement       Moderate PCM and dysphagia: PEG tube feeding.     Preventative measures   dvt ppx>>>Lovenox  fall, aspiration  precautions  Dipso: Patient has no insurance and Family in process of getting guardianship. Patient will then need placement.

## 2023-01-07 LAB
GLUCOSE BLDC GLUCOMTR-MCNC: 136 MG/DL — HIGH (ref 70–99)
GLUCOSE BLDC GLUCOMTR-MCNC: 147 MG/DL — HIGH (ref 70–99)
GLUCOSE BLDC GLUCOMTR-MCNC: 157 MG/DL — HIGH (ref 70–99)
GLUCOSE BLDC GLUCOMTR-MCNC: 191 MG/DL — HIGH (ref 70–99)
GLUCOSE BLDC GLUCOMTR-MCNC: 207 MG/DL — HIGH (ref 70–99)

## 2023-01-07 PROCEDURE — 99232 SBSQ HOSP IP/OBS MODERATE 35: CPT

## 2023-01-07 PROCEDURE — 99233 SBSQ HOSP IP/OBS HIGH 50: CPT

## 2023-01-07 RX ORDER — DIPHENHYDRAMINE HCL 50 MG
50 CAPSULE ORAL ONCE
Refills: 0 | Status: DISCONTINUED | OUTPATIENT
Start: 2023-01-07 | End: 2023-03-11

## 2023-01-07 RX ORDER — CHLORHEXIDINE GLUCONATE 213 G/1000ML
15 SOLUTION TOPICAL EVERY 12 HOURS
Refills: 0 | Status: DISCONTINUED | OUTPATIENT
Start: 2023-01-07 | End: 2023-01-10

## 2023-01-07 RX ORDER — ROBINUL 0.2 MG/ML
1 INJECTION INTRAMUSCULAR; INTRAVENOUS
Refills: 0 | Status: DISCONTINUED | OUTPATIENT
Start: 2023-01-07 | End: 2023-02-08

## 2023-01-07 RX ADMIN — Medication 1 APPLICATION(S): at 17:23

## 2023-01-07 RX ADMIN — Medication 1 MILLIGRAM(S): at 12:17

## 2023-01-07 RX ADMIN — SENNA PLUS 2 TABLET(S): 8.6 TABLET ORAL at 22:18

## 2023-01-07 RX ADMIN — LEVETIRACETAM 1500 MILLIGRAM(S): 250 TABLET, FILM COATED ORAL at 05:26

## 2023-01-07 RX ADMIN — Medication 1 APPLICATION(S): at 05:24

## 2023-01-07 RX ADMIN — BUDESONIDE AND FORMOTEROL FUMARATE DIHYDRATE 2 PUFF(S): 160; 4.5 AEROSOL RESPIRATORY (INHALATION) at 05:21

## 2023-01-07 RX ADMIN — Medication 2: at 08:11

## 2023-01-07 RX ADMIN — APIXABAN 5 MILLIGRAM(S): 2.5 TABLET, FILM COATED ORAL at 17:27

## 2023-01-07 RX ADMIN — ATORVASTATIN CALCIUM 20 MILLIGRAM(S): 80 TABLET, FILM COATED ORAL at 22:18

## 2023-01-07 RX ADMIN — POLYETHYLENE GLYCOL 3350 17 GRAM(S): 17 POWDER, FOR SOLUTION ORAL at 12:12

## 2023-01-07 RX ADMIN — CHLORHEXIDINE GLUCONATE 15 MILLILITER(S): 213 SOLUTION TOPICAL at 17:26

## 2023-01-07 RX ADMIN — Medication 650 MILLIGRAM(S): at 15:16

## 2023-01-07 RX ADMIN — Medication 1 APPLICATION(S): at 17:48

## 2023-01-07 RX ADMIN — BUDESONIDE AND FORMOTEROL FUMARATE DIHYDRATE 2 PUFF(S): 160; 4.5 AEROSOL RESPIRATORY (INHALATION) at 17:18

## 2023-01-07 RX ADMIN — ROBINUL 1 MILLIGRAM(S): 0.2 INJECTION INTRAMUSCULAR; INTRAVENOUS at 17:27

## 2023-01-07 RX ADMIN — PREGABALIN 1000 MICROGRAM(S): 225 CAPSULE ORAL at 12:24

## 2023-01-07 RX ADMIN — CHLORHEXIDINE GLUCONATE 1 APPLICATION(S): 213 SOLUTION TOPICAL at 05:23

## 2023-01-07 RX ADMIN — INSULIN GLARGINE 20 UNIT(S): 100 INJECTION, SOLUTION SUBCUTANEOUS at 08:34

## 2023-01-07 RX ADMIN — Medication 1 APPLICATION(S): at 05:23

## 2023-01-07 RX ADMIN — ALBUTEROL 2 PUFF(S): 90 AEROSOL, METERED ORAL at 13:55

## 2023-01-07 RX ADMIN — Medication 4: at 17:28

## 2023-01-07 RX ADMIN — CHLORHEXIDINE GLUCONATE 15 MILLILITER(S): 213 SOLUTION TOPICAL at 05:25

## 2023-01-07 RX ADMIN — Medication 25 MILLIGRAM(S): at 17:49

## 2023-01-07 RX ADMIN — APIXABAN 5 MILLIGRAM(S): 2.5 TABLET, FILM COATED ORAL at 05:30

## 2023-01-07 RX ADMIN — Medication 81 MILLIGRAM(S): at 12:17

## 2023-01-07 RX ADMIN — Medication 650 MILLIGRAM(S): at 14:25

## 2023-01-07 RX ADMIN — LEVETIRACETAM 1500 MILLIGRAM(S): 250 TABLET, FILM COATED ORAL at 17:27

## 2023-01-07 NOTE — PROGRESS NOTE ADULT - SUBJECTIVE AND OBJECTIVE BOX
Patient is a 72y old  Male who presents with a chief complaint of s/p cardiac arrest, hypoglycemia (07 Jan 2023 10:35)      INTERVAL HPI/OVERNIGHT EVENTS:  Pt was seen and examined, no acute events.      MEDICATIONS  (STANDING):  apixaban 5 milliGRAM(s) Oral every 12 hours  aspirin  chewable 81 milliGRAM(s) Oral daily  atorvastatin 20 milliGRAM(s) Oral at bedtime  bacitracin   Ointment 1 Application(s) Topical two times a day  budesonide 160 MICROgram(s)/formoterol 4.5 MICROgram(s) Inhaler 2 Puff(s) Inhalation two times a day  chlorhexidine 0.12% Liquid 15 milliLiter(s) Oral Mucosa every 12 hours  chlorhexidine 2% Cloths 1 Application(s) Topical <User Schedule>  collagenase Ointment 1 Application(s) Topical two times a day  cyanocobalamin 1000 MICROGram(s) Oral daily  diphenhydrAMINE Injectable 50 milliGRAM(s) IV Push once  folic acid 1 milliGRAM(s) Oral daily  glycopyrrolate 1 milliGRAM(s) Oral two times a day  insulin glargine Injectable (LANTUS) 20 Unit(s) SubCutaneous every morning  insulin lispro (ADMELOG) corrective regimen sliding scale   SubCutaneous every 6 hours  levETIRAcetam 1500 milliGRAM(s) Oral two times a day  LORazepam   Injectable 0.5 milliGRAM(s) IV Push once  metoprolol tartrate 25 milliGRAM(s) Oral two times a day  polyethylene glycol 3350 17 Gram(s) Oral daily  senna 2 Tablet(s) Oral at bedtime  silver sulfADIAZINE 1% Cream 1 Application(s) Topical two times a day    MEDICATIONS  (PRN):  acetaminophen     Tablet .. 650 milliGRAM(s) Oral every 6 hours PRN Temp greater or equal to 38C (100.4F), Mild Pain (1 - 3)  albuterol    90 MICROgram(s) HFA Inhaler 2 Puff(s) Inhalation every 6 hours PRN Shortness of Breath and/or Wheezing  aluminum hydroxide/magnesium hydroxide/simethicone Suspension 30 milliLiter(s) Oral every 4 hours PRN Dyspepsia      Allergies  No Known Allergies      Vital Signs Last 24 Hrs  T(C): 36.6 (07 Jan 2023 11:14), Max: 36.9 (07 Jan 2023 05:00)  T(F): 97.9 (07 Jan 2023 11:14), Max: 98.4 (07 Jan 2023 05:00)  HR: 62 (07 Jan 2023 16:43) (62 - 98)  BP: 117/68 (07 Jan 2023 11:14) (106/71 - 150/50)  BP(mean): 83 (07 Jan 2023 05:00) (83 - 83)  RR: 20 (07 Jan 2023 11:14) (18 - 20)  SpO2: 100% (07 Jan 2023 16:43) (97% - 100%)    Parameters below as of 07 Jan 2023 13:55  Patient On (Oxygen Delivery Method): ventilator,35%        PHYSICAL EXAM:  GENERAL: Obese, NAD  CHEST/LUNG: Decreased air entry bibasally. no wheezing. s/p trach , on trach collar today  HEART: Regular rate and rhythm; + murmur  ABDOMEN: Soft, Nontender, Nondistended; Bowel sounds present.s/p PEG  EXTREMITIES:  Moving all four extremities spontaneously, No clubbing, cyanosis, or edema. localized left hand dorsal surface non tender likely chronic hematoma mass.   NERVOUS SYSTEM:  Grossly non focal. followed simple commands. communicating with writing   Psychiatry: AAO x 3, mood is appropriate       LABS:                        9.4    7.77  )-----------( 231      ( 06 Jan 2023 08:23 )             29.5     01-06    143  |  106  |  30<H>  ----------------------------<  158<H>  4.3   |  33<H>  |  0.82    Ca    8.7      06 Jan 2023 08:23    TPro  5.9<L>  /  Alb  2.0<L>  /  TBili  0.3  /  DBili  x   /  AST  19  /  ALT  25  /  AlkPhos  95  01-06        CAPILLARY BLOOD GLUCOSE      POCT Blood Glucose.: 207 mg/dL (07 Jan 2023 17:03)  POCT Blood Glucose.: 147 mg/dL (07 Jan 2023 11:49)  POCT Blood Glucose.: 191 mg/dL (07 Jan 2023 07:56)  POCT Blood Glucose.: 157 mg/dL (07 Jan 2023 05:29)  POCT Blood Glucose.: 136 mg/dL (07 Jan 2023 01:03)      RADIOLOGY & ADDITIONAL TESTS:    Imaging Personally Reviewed:  [ ] YES  [ ] NO    Consultant(s) Notes Reviewed:  [ ] YES  [ ] NO    Care Discussed with Consultants/Other Providers [ ] YES  [ ] NO

## 2023-01-07 NOTE — PROGRESS NOTE ADULT - ASSESSMENT
REVIEW OF SYMPTOMS      Able to give (reliable) ROS  NO     PHYSICAL EXAM    HEENT Unremarkable  atraumatic   RESP Fair air entry EXP prolonged    Harsh breath sound Resp distres mild   CARDIAC S1 S2 No S3     NO JVD    ABDOMEN SOFT BS PRESENT NOT DISTENDED No hepatosplenomegaly   PEDAL EDEMA present No calf tenderness  NO rash       GENERAL DATA .   GOC.  12/11/2022 full code       ALLGY.  nka                            WT. ..  12/2/2022 86  BMI. ..    12/2/2022 29                          ICU STAY.  .. 11/29-12/9  COVID.   .. 12/2/2022 scv2 (-)   .. 11/20/2022 scv2 (-)   BEST PRACTICE ISSUES.    HOB ELEVATN. Yes  DVT PPLX.    .. 1/3/2023 apixa 5.2 (vte)  12/12 l Subclav throm  ALEJO PPLX.   ..      INFN PPLX.   .. 11/25 chlorhex .12%   .. 11/14 chlorhex 2%   SP SW ANTWAN.         DIET.    ..  12/15 glucerna 1.2 1440 gt   IV fl.  ..            PROCEDURES.  .. 12/19 debridement of sacral wound   .. 12/5/2022 trach  .. 12/5/2022 peg   .. 12/12 r arm midline       ABGS.  1/6/2023 ac 16/450/.3/5 746/49/123   12/3/2022 ac 14/450/.3/5 750/46/75      VS/ PO/IO/ VENT/ DRIPS.  1/7/2023 99f 60 110/70   1/7/2023 ac 14/450/5/.35     PATIENT PRESENTATION.  72 m doa 11/14/2022 cac    PMH  PMH CAD s/p PCI with stent 2015 and CABG 2014,   PMH  s/p medtronic PPM,   PMH CVA (lacunar infarct)    HOSPITAL COURSE   .. 1) PEA arrest with ROSC after approximately 5 min 11/14  Now communicative   .. 2) DVT 12/12 l Subclav thromS 12/15/2022 lvnx 80.2 1/3 changed to apixaban 5.2  .. 3) TRACH 12/5/2022 trach  .. 4) PEG12/5/2022 peg   .. 5) Cellulitis hand absc 12/13 mod enterococcus fecalis  staph epi 12/12-12/15/2022  cephalexin 12/15 vanco once  12/16-12/19 zosyn  .. 6) Vent weaning     PROBLEM ASSESSMENT RECOMMENDATIONS.  WEANING   .. wean as tolerated  .. 1/4/2022  If able to tolerate cpap 5/5 x 2 h will place on tc   .. 1/5/2023 DW Dr Valdovinos Tried on 35% tc   .. 1/6/2023 DW RT Pt to be on monitor during weaning trials and to be placed on vent at night and trach collar during daytime as tolerated   .. 1/7/2023 above reinforced to RT   CHF   .. 11/14/2022 echo mod decr segmental lvsf apical lateral apiccal ant segment are abn takotsubo cmpthy pasp 42 .  .. Monitor for chf has chr hfref per echo   COPD   .. 12/30 symbicort 160   .. 1/7/2023 glycopyrrolate 1.2 added  .. continue bd ics for copd   sp cac   .. good neuro recovery awake alert interactive   VTE  .. 1/3/2023 apixa 5.2 (vte)        TIME SPENT   Over 25 minutes aggregate care time spent on encounter; activities included   direct patient care, counseling and/or coordinating care reviewing notes, lab data/ imaging , discussion with multidisciplinary team/ patient  /family and explaining in detail risks, benefits, alternatives  of the recommendations     BRANDON CAMARILLO

## 2023-01-07 NOTE — PROGRESS NOTE ADULT - ASSESSMENT
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 Unknown how long pt unresponsive for at home. Blood sugar in the 40s with EMS s/p glucagon. On arrival to ER pt hypothermic and agonally breathing. Pt became unresponsive with loss of pulse; ACLS initiated. PEA arrest with ROSC after approximately 5 min s/p Epi x2.  Pt intubated during CODE BLUE. Per son pt on the day prior to presentation reported low blood sugar reads on his glucometer in the range of 80s. Son believes pt continued to take his insulin and oral diabetic regimen. Son states that pt was eating but may have been eating less in the last few days. Later on pt noted to have tonic-clonic Sz.     Problem/Plan-1:  Problem: PEA arrest;s/p Epi x 2. ROSC achieved  CAD/ CHF history:  Takotsubo cardiomyopathy found on Echo   stable  cont current meds. entresto at discharge per cards.     Problem/Plan-2:  Problem: Acute hypoxemic respiratory failure now chronic  s/p intubation , failed extubation, now trach to vent  c/w vent managment, trach collar in daytime   cont current bronchodilators. discussed with pulmonologist     Problem/Plan-3:  Problem: LUE VTE with enterococcal faecalis cellulitis /infected left hand hematoma s/p bedside debridement 12/13   LUE edema and wound of left hand ; good radial pulse   --LUE venous duplex : occlusive thrombus in the left mid subclavian vein with partial   slow flow detected in the lateral subclavian vein.  --LUE arterial Duplex neg   --change lovenox to eliquis 5 mg bid.   --12/13 Hand surgery consult appreciated, s/p bedside debridement.   --antibiotic course completed       Problem/Plan-5:  Problem: New onset Seizure likely due to anoxic brain injury  cont keppra as per colleague discussion with Dr. Loo.       Problem/Plan-6:  Problem: ELMER 2 to ATN  ; resolved    Problem/Plan-7:  Problem: Shock liver.    due to PEA arrest. resolved    Problem/Plan-8:  Problem:DM2   A1c 8.8%  continue with current management   FS goal 140-180   continue with PEG feeds     Problem/Plan-9:  Problem: Hypophosphatemia --repleted     Problem/Plan-10:  Problem: Ustageable sacral pressure wound   s/p debridement 12/19    Problem/Plan-11:  Problem: Normocytic anemia   no e/o bleeding or bruising   H/H stable   low normal Vit B12 and folic acid --supplement       Moderate PCM and dysphagia: PEG tube feeding.     Stage 3 sacral ulcer: Clean, continue wound care    Preventative measures   dvt ppx>>>Lovenox  fall, aspiration  precautions  Dipso: Patient has no insurance and Family in process of getting guardianship. Patient will then need placement.

## 2023-01-07 NOTE — PROGRESS NOTE ADULT - SUBJECTIVE AND OBJECTIVE BOX
BRANDON CAMARILLO    [address] [address]    Allergies    No Known Allergies    Intolerances        PAST MEDICAL & SURGICAL HISTORY:  HTN (hypertension)      HLD (hyperlipidemia)      Diabetes mellitus      Lacunar infarction      BPH (benign prostatic hyperplasia)      CHF (congestive heart failure)      Chronic obstructive pulmonary disease, unspecified COPD type      S/P CABG (coronary artery bypass graft)  2014          FAMILY HISTORY:      Home Medications:  aspirin 81 mg oral delayed release tablet: 1 tab(s) orally once a day (12 Jun 2020 17:35)  Liquifilm Tears preserved ophthalmic solution: 1 drop(s) to each affected eye 2 times a day (12 Jun 2020 17:35)      MEDICATIONS  (STANDING):  apixaban 5 milliGRAM(s) Oral every 12 hours  aspirin  chewable 81 milliGRAM(s) Oral daily  atorvastatin 20 milliGRAM(s) Oral at bedtime  bacitracin   Ointment 1 Application(s) Topical two times a day  budesonide 160 MICROgram(s)/formoterol 4.5 MICROgram(s) Inhaler 2 Puff(s) Inhalation two times a day  chlorhexidine 0.12% Liquid 15 milliLiter(s) Oral Mucosa every 12 hours  chlorhexidine 2% Cloths 1 Application(s) Topical <User Schedule>  collagenase Ointment 1 Application(s) Topical two times a day  cyanocobalamin 1000 MICROGram(s) Oral daily  diphenhydrAMINE Injectable 50 milliGRAM(s) IV Push once  folic acid 1 milliGRAM(s) Oral daily  insulin glargine Injectable (LANTUS) 20 Unit(s) SubCutaneous every morning  insulin lispro (ADMELOG) corrective regimen sliding scale   SubCutaneous every 6 hours  levETIRAcetam 1500 milliGRAM(s) Oral two times a day  LORazepam   Injectable 0.5 milliGRAM(s) IV Push once  metoprolol tartrate 25 milliGRAM(s) Oral two times a day  polyethylene glycol 3350 17 Gram(s) Oral daily  senna 2 Tablet(s) Oral at bedtime  silver sulfADIAZINE 1% Cream 1 Application(s) Topical two times a day    MEDICATIONS  (PRN):  acetaminophen     Tablet .. 650 milliGRAM(s) Oral every 6 hours PRN Temp greater or equal to 38C (100.4F), Mild Pain (1 - 3)  albuterol    90 MICROgram(s) HFA Inhaler 2 Puff(s) Inhalation every 6 hours PRN Shortness of Breath and/or Wheezing  aluminum hydroxide/magnesium hydroxide/simethicone Suspension 30 milliLiter(s) Oral every 4 hours PRN Dyspepsia      Diet, NPO with Tube Feed:   Tube Feeding Modality: Gastrostomy  Glucerna 1.2 Pedrito  Total Volume for 24 Hours (mL): 1440  Continuous  Until Goal Tube Feed Rate (mL per Hour): 60  Tube Feed Duration (in Hours): 24  Tube Feed Start Time: 12:15  Free Water Flush Instructions:  automatic water flus via pump @ 20 ml/hr (12-23-22 @ 12:08) [Active]          Vital Signs Last 24 Hrs  T(C): 36.9 (07 Jan 2023 05:00), Max: 36.9 (07 Jan 2023 05:00)  T(F): 98.4 (07 Jan 2023 05:00), Max: 98.4 (07 Jan 2023 05:00)  HR: 88 (07 Jan 2023 09:37) (69 - 96)  BP: 106/71 (07 Jan 2023 05:00) (106/71 - 150/50)  BP(mean): 83 (07 Jan 2023 05:00) (83 - 83)  RR: 18 (07 Jan 2023 05:00) (18 - 20)  SpO2: 100% (07 Jan 2023 09:37) (96% - 100%)    Parameters below as of 07 Jan 2023 06:20  Patient On (Oxygen Delivery Method): ventilator          01-06-23 @ 07:01  -  01-07-23 @ 07:00  --------------------------------------------------------  IN: 0 mL / OUT: 550 mL / NET: -550 mL        Mode: AC/ CMV (Assist Control/ Continuous Mandatory Ventilation), RR (machine): 14, TV (machine): 450, FiO2: 35, PEEP: 5, ITime: 0.8, MAP: 9, PIP: 21      LABS:                        9.4    7.77  )-----------( 231      ( 06 Jan 2023 08:23 )             29.5     01-06    143  |  106  |  30<H>  ----------------------------<  158<H>  4.3   |  33<H>  |  0.82    Ca    8.7      06 Jan 2023 08:23    TPro  5.9<L>  /  Alb  2.0<L>  /  TBili  0.3  /  DBili  x   /  AST  19  /  ALT  25  /  AlkPhos  95  01-06          ABG - ( 06 Jan 2023 05:00 )  pH, Arterial: 7.46  pH, Blood: x     /  pCO2: 49    /  pO2: 123   / HCO3: 35    / Base Excess: 9.4   /  SaO2: 99.2                WBC:  WBC Count: 7.77 K/uL (01-06 @ 08:23)      MICROBIOLOGY:  RECENT CULTURES:                  Sodium:  Sodium, Serum: 143 mmol/L (01-06 @ 08:23)      0.82 mg/dL 01-06 @ 08:23      Hemoglobin:  Hemoglobin: 9.4 g/dL (01-06 @ 08:23)      Platelets: Platelet Count - Automated: 231 K/uL (01-06 @ 08:23)      LIVER FUNCTIONS - ( 06 Jan 2023 08:23 )  Alb: 2.0 g/dL / Pro: 5.9 gm/dL / ALK PHOS: 95 U/L / ALT: 25 U/L / AST: 19 U/L / GGT: x                 RADIOLOGY & ADDITIONAL STUDIES:      MICROBIOLOGY:  RECENT CULTURES:

## 2023-01-08 LAB
GLUCOSE BLDC GLUCOMTR-MCNC: 135 MG/DL — HIGH (ref 70–99)
GLUCOSE BLDC GLUCOMTR-MCNC: 171 MG/DL — HIGH (ref 70–99)
GLUCOSE BLDC GLUCOMTR-MCNC: 179 MG/DL — HIGH (ref 70–99)
GLUCOSE BLDC GLUCOMTR-MCNC: 197 MG/DL — HIGH (ref 70–99)
MAGNESIUM SERPL-MCNC: 2.3 MG/DL — SIGNIFICANT CHANGE UP (ref 1.6–2.6)
PHOSPHATE SERPL-MCNC: 3.8 MG/DL — SIGNIFICANT CHANGE UP (ref 2.5–4.5)

## 2023-01-08 PROCEDURE — 99232 SBSQ HOSP IP/OBS MODERATE 35: CPT

## 2023-01-08 RX ORDER — ACETAMINOPHEN 500 MG
1000 TABLET ORAL ONCE
Refills: 0 | Status: COMPLETED | OUTPATIENT
Start: 2023-01-08 | End: 2023-01-08

## 2023-01-08 RX ADMIN — Medication 1 APPLICATION(S): at 17:06

## 2023-01-08 RX ADMIN — Medication 1 APPLICATION(S): at 17:08

## 2023-01-08 RX ADMIN — Medication 1 APPLICATION(S): at 06:15

## 2023-01-08 RX ADMIN — LEVETIRACETAM 1500 MILLIGRAM(S): 250 TABLET, FILM COATED ORAL at 06:13

## 2023-01-08 RX ADMIN — ALBUTEROL 2 PUFF(S): 90 AEROSOL, METERED ORAL at 12:53

## 2023-01-08 RX ADMIN — SENNA PLUS 2 TABLET(S): 8.6 TABLET ORAL at 22:26

## 2023-01-08 RX ADMIN — CHLORHEXIDINE GLUCONATE 15 MILLILITER(S): 213 SOLUTION TOPICAL at 17:06

## 2023-01-08 RX ADMIN — Medication 400 MILLIGRAM(S): at 20:50

## 2023-01-08 RX ADMIN — BUDESONIDE AND FORMOTEROL FUMARATE DIHYDRATE 2 PUFF(S): 160; 4.5 AEROSOL RESPIRATORY (INHALATION) at 05:24

## 2023-01-08 RX ADMIN — Medication 1 MILLIGRAM(S): at 12:14

## 2023-01-08 RX ADMIN — INSULIN GLARGINE 20 UNIT(S): 100 INJECTION, SOLUTION SUBCUTANEOUS at 10:04

## 2023-01-08 RX ADMIN — LEVETIRACETAM 1500 MILLIGRAM(S): 250 TABLET, FILM COATED ORAL at 17:06

## 2023-01-08 RX ADMIN — Medication 1 APPLICATION(S): at 06:16

## 2023-01-08 RX ADMIN — POLYETHYLENE GLYCOL 3350 17 GRAM(S): 17 POWDER, FOR SOLUTION ORAL at 12:14

## 2023-01-08 RX ADMIN — CHLORHEXIDINE GLUCONATE 15 MILLILITER(S): 213 SOLUTION TOPICAL at 06:13

## 2023-01-08 RX ADMIN — ROBINUL 1 MILLIGRAM(S): 0.2 INJECTION INTRAMUSCULAR; INTRAVENOUS at 06:14

## 2023-01-08 RX ADMIN — Medication 1000 MILLIGRAM(S): at 21:20

## 2023-01-08 RX ADMIN — CHLORHEXIDINE GLUCONATE 1 APPLICATION(S): 213 SOLUTION TOPICAL at 06:13

## 2023-01-08 RX ADMIN — Medication 25 MILLIGRAM(S): at 17:07

## 2023-01-08 RX ADMIN — ROBINUL 1 MILLIGRAM(S): 0.2 INJECTION INTRAMUSCULAR; INTRAVENOUS at 17:05

## 2023-01-08 RX ADMIN — BUDESONIDE AND FORMOTEROL FUMARATE DIHYDRATE 2 PUFF(S): 160; 4.5 AEROSOL RESPIRATORY (INHALATION) at 17:07

## 2023-01-08 RX ADMIN — Medication 81 MILLIGRAM(S): at 12:14

## 2023-01-08 RX ADMIN — Medication 2: at 06:30

## 2023-01-08 RX ADMIN — PREGABALIN 1000 MICROGRAM(S): 225 CAPSULE ORAL at 12:14

## 2023-01-08 RX ADMIN — ATORVASTATIN CALCIUM 20 MILLIGRAM(S): 80 TABLET, FILM COATED ORAL at 22:27

## 2023-01-08 RX ADMIN — Medication 650 MILLIGRAM(S): at 16:23

## 2023-01-08 RX ADMIN — Medication 2: at 12:25

## 2023-01-08 RX ADMIN — Medication 25 MILLIGRAM(S): at 06:14

## 2023-01-08 RX ADMIN — APIXABAN 5 MILLIGRAM(S): 2.5 TABLET, FILM COATED ORAL at 06:14

## 2023-01-08 RX ADMIN — APIXABAN 5 MILLIGRAM(S): 2.5 TABLET, FILM COATED ORAL at 17:06

## 2023-01-08 NOTE — PROGRESS NOTE ADULT - ASSESSMENT
REVIEW OF SYMPTOMS      Able to give (reliable) ROS  NO     PHYSICAL EXAM    HEENT Unremarkable  atraumatic   RESP Fair air entry EXP prolonged    Harsh breath sound Resp distres mild   CARDIAC S1 S2 No S3     NO JVD    ABDOMEN SOFT BS PRESENT NOT DISTENDED No hepatosplenomegaly   PEDAL EDEMA present No calf tenderness  NO rash       GENERAL DATA .   GOC.  12/11/2022 full code       ALLGY.  nka                            WT. ..  12/2/2022 86  BMI. ..    12/2/2022 29                          ICU STAY.  .. 11/29-12/9  COVID.   .. 12/2/2022 scv2 (-)   .. 11/20/2022 scv2 (-)   BEST PRACTICE ISSUES.    HOB ELEVATN. Yes  DVT PPLX.    .. 1/3/2023 apixa 5.2 (vte)  12/12 l Subclav throm  ALEJO PPLX.   ..      INFN PPLX.   .. 11/25 chlorhex .12%   .. 11/14 chlorhex 2%   SP SW ANTWAN.         DIET.    ..  12/15 glucerna 1.2 1440 gt   IV fl.  ..            PROCEDURES.  .. 12/19 debridement of sacral wound   .. 12/5/2022 trach  .. 12/5/2022 peg   .. 12/12 r arm midline       ABGS.  1/6/2023 ac 16/450/.3/5 746/49/123   12/3/2022 ac 14/450/.3/5 750/46/75      VS/ PO/IO/ VENT/ DRIPS.  1/8/2023 70 110/60   1/8/2023 ac 14/450/5/.35     PATIENT PRESENTATION.  72 m doa 11/14/2022 cac    PMH  PMH CAD s/p PCI with stent 2015 and CABG 2014,   PMH  s/p medtronic PPM,   PMH CVA (lacunar infarct)    HOSPITAL COURSE   .. 1) PEA arrest with ROSC after approximately 5 min 11/14  Now communicative   .. 2) DVT 12/12 l Subclav thromS 12/15/2022 lvnx 80.2 1/3 changed to apixaban 5.2  .. 3) TRACH 12/5/2022 trach  .. 4) PEG12/5/2022 peg   .. 5) Cellulitis hand absc 12/13 mod enterococcus fecalis  staph epi 12/12-12/15/2022  cephalexin 12/15 vanco once  12/16-12/19 zosyn  .. 6) Vent weaning     PROBLEM ASSESSMENT RECOMMENDATIONS.  WEANING   .. wean as tolerated  .. 1/4/2022  If able to tolerate cpap 5/5 x 2 h will place on tc   .. 1/5/2023 DW Dr Valdovinos Tried on 35% tc   .. 1/6/2023 DW RT Pt to be on monitor during weaning trials and to be placed on vent at night and trach collar during daytime as tolerated   .. 1/7/2023 above reinforced to RT   .. 1/8/2023 placed on trach collar but has lot of secretns lasted 45 min   CHF   .. 11/14/2022 echo mod decr segmental lvsf apical lateral apiccal ant segment are abn takotsubo cmpthy pasp 42 .  .. Monitor for chf has chr hfref per echo   COPD   .. 12/30 symbicort 160   .. 1/7/2023 glycopyrrolate 1.2 added  .. continue bd ics for copd   sp cac   .. good neuro recovery awake alert interactive   VTE  .. 1/3/2023 apixa 5.2 (vte)        TIME SPENT   Over 25 minutes aggregate care time spent on encounter; activities included   direct patient care, counseling and/or coordinating care reviewing notes, lab data/ imaging , discussion with multidisciplinary team/ patient  /family and explaining in detail risks, benefits, alternatives  of the recommendations     BRANDON CAMARILLO

## 2023-01-08 NOTE — PROGRESS NOTE ADULT - ASSESSMENT
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 Unknown how long pt unresponsive for at home. Blood sugar in the 40s with EMS s/p glucagon. On arrival to ER pt hypothermic and agonally breathing. Pt became unresponsive with loss of pulse; ACLS initiated. PEA arrest with ROSC after approximately 5 min s/p Epi x2.  Pt intubated during CODE BLUE. Per son pt on the day prior to presentation reported low blood sugar reads on his glucometer in the range of 80s. Son believes pt continued to take his insulin and oral diabetic regimen. Son states that pt was eating but may have been eating less in the last few days. Later on pt noted to have tonic-clonic Sz.     Problem/Plan-1:  Problem: PEA arrest;s/p Epi x 2. ROSC achieved  CAD/ CHF history:  Takotsubo cardiomyopathy found on Echo   stable  cont current meds. entresto at discharge per cards.     Problem/Plan-2:  Problem: Acute hypoxemic respiratory failure now chronic  s/p intubation , failed extubation, now trach to vent  c/w vent managment, trach collar in daytime   cont current bronchodilators. discussed with pulmonologist     Problem/Plan-3:  Problem: LUE VTE with enterococcal faecalis cellulitis /infected left hand hematoma s/p bedside debridement 12/13   LUE edema and wound of left hand ; good radial pulse   --LUE venous duplex : occlusive thrombus in the left mid subclavian vein with partial   slow flow detected in the lateral subclavian vein.  --LUE arterial Duplex neg   --change lovenox to eliquis 5 mg bid.   --12/13 Hand surgery consult appreciated, s/p bedside debridement.   --antibiotic course completed       Problem/Plan-5:  Problem: New onset Seizure likely due to anoxic brain injury  cont keppra as per colleague discussion with Dr. Loo.       Problem/Plan-6:  Problem: ELMER 2 to ATN  ; resolved    Problem/Plan-7:  Problem: Shock liver.    due to PEA arrest. resolved    Problem/Plan-8:  Problem:DM2   A1c 8.8%  continue with current management   FS goal 140-180   continue with PEG feeds     Problem/Plan-9:  Problem: Hypophosphatemia --repleted     Problem/Plan-10:  Problem: Ustageable sacral pressure wound   s/p debridement 12/19    Problem/Plan-11:  Problem: Normocytic anemia   no e/o bleeding or bruising   H/H stable   low normal Vit B12 and folic acid --supplement       Moderate PCM and dysphagia: PEG tube feeding.     Stage 3 sacral ulcer: Clean, continue wound care    Preventative measures   dvt ppx>>>on DOAC  fall, aspiration  precautions  Dipso: Patient has no insurance and Family in process of getting guardianship. Patient will then need placement.

## 2023-01-08 NOTE — PROGRESS NOTE ADULT - SUBJECTIVE AND OBJECTIVE BOX
Patient is a 72y old  Male who presents with a chief complaint of s/p cardiac arrest, hypoglycemia (08 Jan 2023 10:16)      INTERVAL HPI/OVERNIGHT EVENTS:  Pt was seen and examined, no acute events.      MEDICATIONS  (STANDING):  apixaban 5 milliGRAM(s) Oral every 12 hours  aspirin  chewable 81 milliGRAM(s) Oral daily  atorvastatin 20 milliGRAM(s) Oral at bedtime  bacitracin   Ointment 1 Application(s) Topical two times a day  budesonide 160 MICROgram(s)/formoterol 4.5 MICROgram(s) Inhaler 2 Puff(s) Inhalation two times a day  chlorhexidine 0.12% Liquid 15 milliLiter(s) Oral Mucosa every 12 hours  chlorhexidine 2% Cloths 1 Application(s) Topical <User Schedule>  collagenase Ointment 1 Application(s) Topical two times a day  cyanocobalamin 1000 MICROGram(s) Oral daily  diphenhydrAMINE Injectable 50 milliGRAM(s) IV Push once  folic acid 1 milliGRAM(s) Oral daily  glycopyrrolate 1 milliGRAM(s) Oral two times a day  insulin glargine Injectable (LANTUS) 20 Unit(s) SubCutaneous every morning  insulin lispro (ADMELOG) corrective regimen sliding scale   SubCutaneous every 6 hours  levETIRAcetam 1500 milliGRAM(s) Oral two times a day  LORazepam   Injectable 0.5 milliGRAM(s) IV Push once  metoprolol tartrate 25 milliGRAM(s) Oral two times a day  polyethylene glycol 3350 17 Gram(s) Oral daily  senna 2 Tablet(s) Oral at bedtime  silver sulfADIAZINE 1% Cream 1 Application(s) Topical two times a day    MEDICATIONS  (PRN):  acetaminophen     Tablet .. 650 milliGRAM(s) Oral every 6 hours PRN Temp greater or equal to 38C (100.4F), Mild Pain (1 - 3)  albuterol    90 MICROgram(s) HFA Inhaler 2 Puff(s) Inhalation every 6 hours PRN Shortness of Breath and/or Wheezing  aluminum hydroxide/magnesium hydroxide/simethicone Suspension 30 milliLiter(s) Oral every 4 hours PRN Dyspepsia      Allergies  No Known Allergies        Vital Signs Last 24 Hrs  T(C): 35.7 (08 Jan 2023 16:23), Max: 37.2 (07 Jan 2023 19:00)  T(F): 96.3 (08 Jan 2023 16:23), Max: 99 (07 Jan 2023 19:00)  HR: 80 (08 Jan 2023 17:07) (59 - 88)  BP: 111/67 (08 Jan 2023 16:23) (102/62 - 131/78)  BP(mean): 74 (08 Jan 2023 16:23) (74 - 74)  RR: 20 (08 Jan 2023 11:03) (18 - 20)  SpO2: 100% (08 Jan 2023 17:07) (98% - 100%)    Parameters below as of 08 Jan 2023 17:07  Patient On (Oxygen Delivery Method): ventilator,35%        PHYSICAL EXAM:  GENERAL: Obese, NAD  CHEST/LUNG: Decreased air entry bibasally. no wheezing. s/p trach , on trach collar today  HEART: Regular rate and rhythm; + murmur  ABDOMEN: Soft, Nontender, Nondistended; Bowel sounds present.s/p PEG  EXTREMITIES:  Moving all four extremities spontaneously, No clubbing, cyanosis, or edema. localized left hand dorsal surface non tender likely chronic hematoma mass.   NERVOUS SYSTEM:  Grossly non focal. followed simple commands. communicating with writing   Psychiatry: AAO x 3, mood is appropriate       LABS:      Phos  3.8     01-08  Mg     2.3     01-08          CAPILLARY BLOOD GLUCOSE      POCT Blood Glucose.: 135 mg/dL (08 Jan 2023 16:50)  POCT Blood Glucose.: 179 mg/dL (08 Jan 2023 12:18)  POCT Blood Glucose.: 171 mg/dL (08 Jan 2023 09:58)  POCT Blood Glucose.: 197 mg/dL (08 Jan 2023 06:25)      RADIOLOGY & ADDITIONAL TESTS:    Imaging Personally Reviewed:  [ ] YES  [ ] NO    Consultant(s) Notes Reviewed:  [ ] YES  [ ] NO    Care Discussed with Consultants/Other Providers [ ] YES  [ ] NO

## 2023-01-08 NOTE — PROGRESS NOTE ADULT - SUBJECTIVE AND OBJECTIVE BOX
BRANDON CAMARILLO    [facility] [address]    Allergies    No Known Allergies    Intolerances        PAST MEDICAL & SURGICAL HISTORY:  HTN (hypertension)      HLD (hyperlipidemia)      Diabetes mellitus      Lacunar infarction      BPH (benign prostatic hyperplasia)      CHF (congestive heart failure)      Chronic obstructive pulmonary disease, unspecified COPD type      S/P CABG (coronary artery bypass graft)  2014          FAMILY HISTORY:      Home Medications:  aspirin 81 mg oral delayed release tablet: 1 tab(s) orally once a day (12 Jun 2020 17:35)  Liquifilm Tears preserved ophthalmic solution: 1 drop(s) to each affected eye 2 times a day (12 Jun 2020 17:35)      MEDICATIONS  (STANDING):  apixaban 5 milliGRAM(s) Oral every 12 hours  aspirin  chewable 81 milliGRAM(s) Oral daily  atorvastatin 20 milliGRAM(s) Oral at bedtime  bacitracin   Ointment 1 Application(s) Topical two times a day  budesonide 160 MICROgram(s)/formoterol 4.5 MICROgram(s) Inhaler 2 Puff(s) Inhalation two times a day  chlorhexidine 0.12% Liquid 15 milliLiter(s) Oral Mucosa every 12 hours  chlorhexidine 2% Cloths 1 Application(s) Topical <User Schedule>  collagenase Ointment 1 Application(s) Topical two times a day  cyanocobalamin 1000 MICROGram(s) Oral daily  diphenhydrAMINE Injectable 50 milliGRAM(s) IV Push once  folic acid 1 milliGRAM(s) Oral daily  glycopyrrolate 1 milliGRAM(s) Oral two times a day  insulin glargine Injectable (LANTUS) 20 Unit(s) SubCutaneous every morning  insulin lispro (ADMELOG) corrective regimen sliding scale   SubCutaneous every 6 hours  levETIRAcetam 1500 milliGRAM(s) Oral two times a day  LORazepam   Injectable 0.5 milliGRAM(s) IV Push once  metoprolol tartrate 25 milliGRAM(s) Oral two times a day  polyethylene glycol 3350 17 Gram(s) Oral daily  senna 2 Tablet(s) Oral at bedtime  silver sulfADIAZINE 1% Cream 1 Application(s) Topical two times a day    MEDICATIONS  (PRN):  acetaminophen     Tablet .. 650 milliGRAM(s) Oral every 6 hours PRN Temp greater or equal to 38C (100.4F), Mild Pain (1 - 3)  albuterol    90 MICROgram(s) HFA Inhaler 2 Puff(s) Inhalation every 6 hours PRN Shortness of Breath and/or Wheezing  aluminum hydroxide/magnesium hydroxide/simethicone Suspension 30 milliLiter(s) Oral every 4 hours PRN Dyspepsia      Diet, NPO with Tube Feed:   Tube Feeding Modality: Gastrostomy  Glucerna 1.2 Pedrito  Total Volume for 24 Hours (mL): 1440  Continuous  Until Goal Tube Feed Rate (mL per Hour): 60  Tube Feed Duration (in Hours): 24  Tube Feed Start Time: 12:15  Free Water Flush Instructions:  automatic water flus via pump @ 20 ml/hr (12-23-22 @ 12:08) [Active]          Vital Signs Last 24 Hrs  T(C): 36.9 (08 Jan 2023 04:43), Max: 37.2 (07 Jan 2023 19:00)  T(F): 98.4 (08 Jan 2023 04:43), Max: 99 (07 Jan 2023 19:00)  HR: 87 (08 Jan 2023 08:34) (59 - 98)  BP: 131/78 (08 Jan 2023 04:43) (113/70 - 131/78)  BP(mean): --  RR: 18 (08 Jan 2023 04:43) (18 - 20)  SpO2: 100% (08 Jan 2023 08:34) (97% - 100%)    Parameters below as of 08 Jan 2023 06:08  Patient On (Oxygen Delivery Method): ventilator            Mode: AC/ CMV (Assist Control/ Continuous Mandatory Ventilation), RR (machine): 14, TV (machine): 450, FiO2: 35, PEEP: 5, ITime: 1, MAP: 11, PIP: 23      LABS:                    WBC:  WBC Count: 7.77 K/uL (01-06 @ 08:23)      MICROBIOLOGY:  RECENT CULTURES:                  Sodium:  Sodium, Serum: 143 mmol/L (01-06 @ 08:23)      0.82 mg/dL 01-06 @ 08:23      Hemoglobin:  Hemoglobin: 9.4 g/dL (01-06 @ 08:23)      Platelets: Platelet Count - Automated: 231 K/uL (01-06 @ 08:23)              RADIOLOGY & ADDITIONAL STUDIES:      MICROBIOLOGY:  RECENT CULTURES:

## 2023-01-09 LAB
ALBUMIN SERPL ELPH-MCNC: 2.2 G/DL — LOW (ref 3.3–5)
ALP SERPL-CCNC: 87 U/L — SIGNIFICANT CHANGE UP (ref 40–120)
ALT FLD-CCNC: 28 U/L — SIGNIFICANT CHANGE UP (ref 12–78)
ANION GAP SERPL CALC-SCNC: 3 MMOL/L — LOW (ref 5–17)
AST SERPL-CCNC: 22 U/L — SIGNIFICANT CHANGE UP (ref 15–37)
BILIRUB SERPL-MCNC: 0.9 MG/DL — SIGNIFICANT CHANGE UP (ref 0.2–1.2)
BUN SERPL-MCNC: 41 MG/DL — HIGH (ref 7–23)
CALCIUM SERPL-MCNC: 8.6 MG/DL — SIGNIFICANT CHANGE UP (ref 8.5–10.1)
CHLORIDE SERPL-SCNC: 104 MMOL/L — SIGNIFICANT CHANGE UP (ref 96–108)
CO2 SERPL-SCNC: 32 MMOL/L — HIGH (ref 22–31)
CREAT SERPL-MCNC: 0.99 MG/DL — SIGNIFICANT CHANGE UP (ref 0.5–1.3)
EGFR: 80 ML/MIN/1.73M2 — SIGNIFICANT CHANGE UP
GLUCOSE BLDC GLUCOMTR-MCNC: 112 MG/DL — HIGH (ref 70–99)
GLUCOSE BLDC GLUCOMTR-MCNC: 165 MG/DL — HIGH (ref 70–99)
GLUCOSE BLDC GLUCOMTR-MCNC: 168 MG/DL — HIGH (ref 70–99)
GLUCOSE BLDC GLUCOMTR-MCNC: 192 MG/DL — HIGH (ref 70–99)
GLUCOSE BLDC GLUCOMTR-MCNC: 199 MG/DL — HIGH (ref 70–99)
GLUCOSE BLDC GLUCOMTR-MCNC: 83 MG/DL — SIGNIFICANT CHANGE UP (ref 70–99)
GLUCOSE SERPL-MCNC: 117 MG/DL — HIGH (ref 70–99)
HCT VFR BLD CALC: 29.7 % — LOW (ref 39–50)
HGB BLD-MCNC: 9.2 G/DL — LOW (ref 13–17)
MCHC RBC-ENTMCNC: 30.3 PG — SIGNIFICANT CHANGE UP (ref 27–34)
MCHC RBC-ENTMCNC: 31 G/DL — LOW (ref 32–36)
MCV RBC AUTO: 97.7 FL — SIGNIFICANT CHANGE UP (ref 80–100)
NRBC # BLD: 0 /100 WBCS — SIGNIFICANT CHANGE UP (ref 0–0)
PLATELET # BLD AUTO: 220 K/UL — SIGNIFICANT CHANGE UP (ref 150–400)
POTASSIUM SERPL-MCNC: 4 MMOL/L — SIGNIFICANT CHANGE UP (ref 3.5–5.3)
POTASSIUM SERPL-SCNC: 4 MMOL/L — SIGNIFICANT CHANGE UP (ref 3.5–5.3)
PROT SERPL-MCNC: 6 GM/DL — SIGNIFICANT CHANGE UP (ref 6–8.3)
RBC # BLD: 3.04 M/UL — LOW (ref 4.2–5.8)
RBC # FLD: 15 % — HIGH (ref 10.3–14.5)
SODIUM SERPL-SCNC: 139 MMOL/L — SIGNIFICANT CHANGE UP (ref 135–145)
WBC # BLD: 7.38 K/UL — SIGNIFICANT CHANGE UP (ref 3.8–10.5)
WBC # FLD AUTO: 7.38 K/UL — SIGNIFICANT CHANGE UP (ref 3.8–10.5)

## 2023-01-09 PROCEDURE — 99232 SBSQ HOSP IP/OBS MODERATE 35: CPT

## 2023-01-09 RX ORDER — IPRATROPIUM/ALBUTEROL SULFATE 18-103MCG
3 AEROSOL WITH ADAPTER (GRAM) INHALATION EVERY 6 HOURS
Refills: 0 | Status: DISCONTINUED | OUTPATIENT
Start: 2023-01-09 | End: 2023-02-01

## 2023-01-09 RX ORDER — IPRATROPIUM BROMIDE 0.2 MG/ML
2 SOLUTION, NON-ORAL INHALATION EVERY 6 HOURS
Refills: 0 | Status: DISCONTINUED | OUTPATIENT
Start: 2023-01-09 | End: 2023-01-09

## 2023-01-09 RX ADMIN — Medication 1 APPLICATION(S): at 06:56

## 2023-01-09 RX ADMIN — Medication 1 APPLICATION(S): at 17:21

## 2023-01-09 RX ADMIN — Medication 1 APPLICATION(S): at 06:55

## 2023-01-09 RX ADMIN — ROBINUL 1 MILLIGRAM(S): 0.2 INJECTION INTRAMUSCULAR; INTRAVENOUS at 06:53

## 2023-01-09 RX ADMIN — Medication 2: at 11:39

## 2023-01-09 RX ADMIN — SENNA PLUS 2 TABLET(S): 8.6 TABLET ORAL at 21:22

## 2023-01-09 RX ADMIN — Medication 1 APPLICATION(S): at 18:57

## 2023-01-09 RX ADMIN — Medication 2: at 23:47

## 2023-01-09 RX ADMIN — CHLORHEXIDINE GLUCONATE 15 MILLILITER(S): 213 SOLUTION TOPICAL at 06:53

## 2023-01-09 RX ADMIN — Medication 1 MILLIGRAM(S): at 11:47

## 2023-01-09 RX ADMIN — POLYETHYLENE GLYCOL 3350 17 GRAM(S): 17 POWDER, FOR SOLUTION ORAL at 15:48

## 2023-01-09 RX ADMIN — ROBINUL 1 MILLIGRAM(S): 0.2 INJECTION INTRAMUSCULAR; INTRAVENOUS at 17:24

## 2023-01-09 RX ADMIN — BUDESONIDE AND FORMOTEROL FUMARATE DIHYDRATE 2 PUFF(S): 160; 4.5 AEROSOL RESPIRATORY (INHALATION) at 05:40

## 2023-01-09 RX ADMIN — LEVETIRACETAM 1500 MILLIGRAM(S): 250 TABLET, FILM COATED ORAL at 17:27

## 2023-01-09 RX ADMIN — Medication 1 APPLICATION(S): at 06:53

## 2023-01-09 RX ADMIN — Medication 25 MILLIGRAM(S): at 17:25

## 2023-01-09 RX ADMIN — BUDESONIDE AND FORMOTEROL FUMARATE DIHYDRATE 2 PUFF(S): 160; 4.5 AEROSOL RESPIRATORY (INHALATION) at 18:21

## 2023-01-09 RX ADMIN — PREGABALIN 1000 MICROGRAM(S): 225 CAPSULE ORAL at 17:25

## 2023-01-09 RX ADMIN — LEVETIRACETAM 1500 MILLIGRAM(S): 250 TABLET, FILM COATED ORAL at 06:54

## 2023-01-09 RX ADMIN — INSULIN GLARGINE 20 UNIT(S): 100 INJECTION, SOLUTION SUBCUTANEOUS at 08:44

## 2023-01-09 RX ADMIN — Medication 1 APPLICATION(S): at 17:27

## 2023-01-09 RX ADMIN — Medication 25 MILLIGRAM(S): at 06:56

## 2023-01-09 RX ADMIN — APIXABAN 5 MILLIGRAM(S): 2.5 TABLET, FILM COATED ORAL at 17:25

## 2023-01-09 RX ADMIN — CHLORHEXIDINE GLUCONATE 15 MILLILITER(S): 213 SOLUTION TOPICAL at 17:22

## 2023-01-09 RX ADMIN — Medication 650 MILLIGRAM(S): at 03:44

## 2023-01-09 RX ADMIN — ATORVASTATIN CALCIUM 20 MILLIGRAM(S): 80 TABLET, FILM COATED ORAL at 21:30

## 2023-01-09 RX ADMIN — Medication 2: at 00:33

## 2023-01-09 RX ADMIN — Medication 0.5 MILLIGRAM(S): at 03:44

## 2023-01-09 RX ADMIN — Medication 81 MILLIGRAM(S): at 11:48

## 2023-01-09 RX ADMIN — CHLORHEXIDINE GLUCONATE 1 APPLICATION(S): 213 SOLUTION TOPICAL at 06:55

## 2023-01-09 RX ADMIN — APIXABAN 5 MILLIGRAM(S): 2.5 TABLET, FILM COATED ORAL at 06:57

## 2023-01-09 NOTE — PROGRESS NOTE ADULT - SUBJECTIVE AND OBJECTIVE BOX
Patient is a 72y old  Male who presents with a chief complaint of s/p cardiac arrest, hypoglycemia (09 Jan 2023 08:20)      INTERVAL HPI/OVERNIGHT EVENTS:  Pt was seen and examined, no acute events.      MEDICATIONS  (STANDING):  apixaban 5 milliGRAM(s) Oral every 12 hours  aspirin  chewable 81 milliGRAM(s) Oral daily  atorvastatin 20 milliGRAM(s) Oral at bedtime  bacitracin   Ointment 1 Application(s) Topical two times a day  budesonide 160 MICROgram(s)/formoterol 4.5 MICROgram(s) Inhaler 2 Puff(s) Inhalation two times a day  chlorhexidine 0.12% Liquid 15 milliLiter(s) Oral Mucosa every 12 hours  chlorhexidine 2% Cloths 1 Application(s) Topical <User Schedule>  collagenase Ointment 1 Application(s) Topical two times a day  cyanocobalamin 1000 MICROGram(s) Oral daily  diphenhydrAMINE Injectable 50 milliGRAM(s) IV Push once  folic acid 1 milliGRAM(s) Oral daily  glycopyrrolate 1 milliGRAM(s) Oral two times a day  insulin glargine Injectable (LANTUS) 20 Unit(s) SubCutaneous every morning  insulin lispro (ADMELOG) corrective regimen sliding scale   SubCutaneous every 6 hours  levETIRAcetam 1500 milliGRAM(s) Oral two times a day  metoprolol tartrate 25 milliGRAM(s) Oral two times a day  polyethylene glycol 3350 17 Gram(s) Oral daily  senna 2 Tablet(s) Oral at bedtime  silver sulfADIAZINE 1% Cream 1 Application(s) Topical two times a day    MEDICATIONS  (PRN):  acetaminophen     Tablet .. 650 milliGRAM(s) Oral every 6 hours PRN Temp greater or equal to 38C (100.4F), Mild Pain (1 - 3)  albuterol    90 MICROgram(s) HFA Inhaler 2 Puff(s) Inhalation every 6 hours PRN Shortness of Breath and/or Wheezing  aluminum hydroxide/magnesium hydroxide/simethicone Suspension 30 milliLiter(s) Oral every 4 hours PRN Dyspepsia      Allergies  No Known Allergies      Vital Signs Last 24 Hrs  T(C): 37.2 (09 Jan 2023 16:05), Max: 37.2 (09 Jan 2023 10:50)  T(F): 98.9 (09 Jan 2023 16:05), Max: 98.9 (09 Jan 2023 10:50)  HR: 62 (09 Jan 2023 16:05) (60 - 82)  BP: 121/74 (09 Jan 2023 16:05) (106/64 - 121/74)  BP(mean): --  RR: 18 (09 Jan 2023 16:05) (18 - 18)  SpO2: 100% (09 Jan 2023 16:05) (100% - 100%)    Parameters below as of 09 Jan 2023 06:14  Patient On (Oxygen Delivery Method): ventilator        PHYSICAL EXAM:  GENERAL: Obese, NAD  CHEST/LUNG: Decreased air entry bibasally. no wheezing. s/p trach , on trach collar today  HEART: Regular rate and rhythm; + murmur  ABDOMEN: Soft, Nontender, Nondistended; Bowel sounds present.s/p PEG  EXTREMITIES:  Moving all four extremities spontaneously, No clubbing, cyanosis, or edema. localized left hand dorsal surface non tender likely chronic hematoma mass.   NERVOUS SYSTEM:  Grossly non focal. followed simple commands. communicating with writing   Psychiatry: AAO x 3, mood is appropriate         LABS:                        9.2    7.38  )-----------( 220      ( 09 Jan 2023 07:10 )             29.7     01-09    139  |  104  |  41<H>  ----------------------------<  117<H>  4.0   |  32<H>  |  0.99    Ca    8.6      09 Jan 2023 07:10  Phos  3.8     01-08  Mg     2.3     01-08    TPro  6.0  /  Alb  2.2<L>  /  TBili  0.9  /  DBili  x   /  AST  22  /  ALT  28  /  AlkPhos  87  01-09        CAPILLARY BLOOD GLUCOSE      POCT Blood Glucose.: 83 mg/dL (09 Jan 2023 16:21)  POCT Blood Glucose.: 192 mg/dL (09 Jan 2023 10:55)  POCT Blood Glucose.: 168 mg/dL (09 Jan 2023 08:17)  POCT Blood Glucose.: 112 mg/dL (09 Jan 2023 06:50)  POCT Blood Glucose.: 199 mg/dL (09 Jan 2023 00:29)      RADIOLOGY & ADDITIONAL TESTS:    Imaging Personally Reviewed:  [ ] YES  [ ] NO    Consultant(s) Notes Reviewed:  [ ] YES  [ ] NO    Care Discussed with Consultants/Other Providers [ ] YES  [ ] NO

## 2023-01-09 NOTE — PROGRESS NOTE ADULT - ASSESSMENT
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 Unknown how long pt unresponsive for at home. Blood sugar in the 40s with EMS s/p glucagon. On arrival to ER pt hypothermic and agonally breathing. Pt became unresponsive with loss of pulse; ACLS initiated. PEA arrest with ROSC after approximately 5 min s/p Epi x2.  Pt intubated during CODE BLUE. Per son pt on the day prior to presentation reported low blood sugar reads on his glucometer in the range of 80s. Son believes pt continued to take his insulin and oral diabetic regimen. Son states that pt was eating but may have been eating less in the last few days. Later on pt noted to have tonic-clonic Sz.     Problem/Plan-1:  Problem: PEA arrest;s/p Epi x 2. ROSC achieved  CAD/ CHF history:  Takotsubo cardiomyopathy found on Echo   stable  cont current meds. entresto at discharge per cards.     Problem/Plan-2:  Problem: Acute hypoxemic respiratory failure now chronic  s/p intubation , failed extubation, now trach to vent  c/w vent management, trach collar in daytime   cont current bronchodilators. discussed with pulmonologist   Rashi leaking , asked sx to check    Problem/Plan-3:  Problem: LUE VTE with enterococcal faecalis cellulitis /infected left hand hematoma s/p bedside debridement 12/13   LUE edema and wound of left hand ; good radial pulse   --LUE venous duplex : occlusive thrombus in the left mid subclavian vein with partial   slow flow detected in the lateral subclavian vein.  --LUE arterial Duplex neg   --change lovenox to eliquis 5 mg bid.   --12/13 Hand surgery consult appreciated, s/p bedside debridement.   --antibiotic course completed       Problem/Plan-5:  Problem: New onset Seizure likely due to anoxic brain injury  cont keppra as per colleague discussion with Dr. Loo.       Problem/Plan-6:  Problem: ELMER 2 to ATN  ; resolved    Problem/Plan-7:  Problem: Shock liver.    due to PEA arrest. resolved    Problem/Plan-8:  Problem:DM2   A1c 8.8%  continue with current management   FS goal 140-180   continue with PEG feeds     Problem/Plan-9:  Problem: Hypophosphatemia --repleted     Problem/Plan-10:  Problem: Ustageable sacral pressure wound   s/p debridement 12/19    Problem/Plan-11:  Problem: Normocytic anemia   no e/o bleeding or bruising   H/H stable   low normal Vit B12 and folic acid --supplement       Moderate PCM and dysphagia: PEG tube feeding.     Stage 3 sacral ulcer: Clean, continue wound care    Preventative measures   dvt ppx>>>on DOAC  fall, aspiration  precautions  Dipso: Patient has no insurance and Family in process of getting guardianship. Patient will then need placement.

## 2023-01-09 NOTE — PROGRESS NOTE ADULT - SUBJECTIVE AND OBJECTIVE BOX
BRANDON CAMARILLO    [address] [address]    Allergies    No Known Allergies    Intolerances        PAST MEDICAL & SURGICAL HISTORY:  HTN (hypertension)      HLD (hyperlipidemia)      Diabetes mellitus      Lacunar infarction      BPH (benign prostatic hyperplasia)      CHF (congestive heart failure)      Chronic obstructive pulmonary disease, unspecified COPD type      S/P CABG (coronary artery bypass graft)  2014          FAMILY HISTORY:      Home Medications:  aspirin 81 mg oral delayed release tablet: 1 tab(s) orally once a day (12 Jun 2020 17:35)  Liquifilm Tears preserved ophthalmic solution: 1 drop(s) to each affected eye 2 times a day (12 Jun 2020 17:35)      MEDICATIONS  (STANDING):  apixaban 5 milliGRAM(s) Oral every 12 hours  aspirin  chewable 81 milliGRAM(s) Oral daily  atorvastatin 20 milliGRAM(s) Oral at bedtime  bacitracin   Ointment 1 Application(s) Topical two times a day  budesonide 160 MICROgram(s)/formoterol 4.5 MICROgram(s) Inhaler 2 Puff(s) Inhalation two times a day  chlorhexidine 0.12% Liquid 15 milliLiter(s) Oral Mucosa every 12 hours  chlorhexidine 2% Cloths 1 Application(s) Topical <User Schedule>  collagenase Ointment 1 Application(s) Topical two times a day  cyanocobalamin 1000 MICROGram(s) Oral daily  diphenhydrAMINE Injectable 50 milliGRAM(s) IV Push once  folic acid 1 milliGRAM(s) Oral daily  glycopyrrolate 1 milliGRAM(s) Oral two times a day  insulin glargine Injectable (LANTUS) 20 Unit(s) SubCutaneous every morning  insulin lispro (ADMELOG) corrective regimen sliding scale   SubCutaneous every 6 hours  levETIRAcetam 1500 milliGRAM(s) Oral two times a day  metoprolol tartrate 25 milliGRAM(s) Oral two times a day  polyethylene glycol 3350 17 Gram(s) Oral daily  senna 2 Tablet(s) Oral at bedtime  silver sulfADIAZINE 1% Cream 1 Application(s) Topical two times a day    MEDICATIONS  (PRN):  acetaminophen     Tablet .. 650 milliGRAM(s) Oral every 6 hours PRN Temp greater or equal to 38C (100.4F), Mild Pain (1 - 3)  albuterol    90 MICROgram(s) HFA Inhaler 2 Puff(s) Inhalation every 6 hours PRN Shortness of Breath and/or Wheezing  aluminum hydroxide/magnesium hydroxide/simethicone Suspension 30 milliLiter(s) Oral every 4 hours PRN Dyspepsia      Diet, NPO with Tube Feed:   Tube Feeding Modality: Gastrostomy  Glucerna 1.2 Pedrito  Total Volume for 24 Hours (mL): 1440  Continuous  Until Goal Tube Feed Rate (mL per Hour): 60  Tube Feed Duration (in Hours): 24  Tube Feed Start Time: 12:15  Free Water Flush Instructions:  automatic water flus via pump @ 20 ml/hr (12-23-22 @ 12:08) [Active]          Vital Signs Last 24 Hrs  T(C): 36.6 (09 Jan 2023 04:45), Max: 36.9 (08 Jan 2023 11:03)  T(F): 97.9 (09 Jan 2023 04:45), Max: 98.5 (08 Jan 2023 11:03)  HR: 77 (09 Jan 2023 06:14) (61 - 88)  BP: 120/72 (09 Jan 2023 04:45) (102/62 - 120/72)  BP(mean): 74 (08 Jan 2023 16:23) (74 - 74)  RR: 18 (09 Jan 2023 04:45) (18 - 20)  SpO2: 100% (09 Jan 2023 06:14) (100% - 100%)    Parameters below as of 09 Jan 2023 06:14  Patient On (Oxygen Delivery Method): ventilator          01-08-23 @ 07:01  -  01-09-23 @ 07:00  --------------------------------------------------------  IN: 960 mL / OUT: 0 mL / NET: 960 mL        Mode: AC/ CMV (Assist Control/ Continuous Mandatory Ventilation), RR (machine): 14, TV (machine): 450, FiO2: 35, PEEP: 5, ITime: 1, MAP: 9, PIP: 23      LABS:                        9.2    7.38  )-----------( 220      ( 09 Jan 2023 07:10 )             29.7       Phos  3.8     01-08  Mg     2.3     01-08                WBC:  WBC Count: 7.38 K/uL (01-09 @ 07:10)  WBC Count: 7.77 K/uL (01-06 @ 08:23)      MICROBIOLOGY:  RECENT CULTURES:                  Sodium:  Sodium, Serum: 143 mmol/L (01-06 @ 08:23)      0.82 mg/dL 01-06 @ 08:23      Hemoglobin:  Hemoglobin: 9.2 g/dL (01-09 @ 07:10)  Hemoglobin: 9.4 g/dL (01-06 @ 08:23)      Platelets: Platelet Count - Automated: 220 K/uL (01-09 @ 07:10)  Platelet Count - Automated: 231 K/uL (01-06 @ 08:23)              RADIOLOGY & ADDITIONAL STUDIES:      MICROBIOLOGY:  RECENT CULTURES:

## 2023-01-09 NOTE — CHART NOTE - NSCHARTNOTEFT_GEN_A_CORE
Assessment:  G-Tube feeding infusing c Glucerna 1.2 @ 60 ml/hr & automatic water flush @ 20 ml/hr when seen, pt's son was @ bedside, trach-> vent use @ night reported.  Pt adm c dx of hypoglycemia, cardiac arrest, hypothermia, c acute respiratory failure, now chronic, seizures, s/p ELMER, shock liver, E. Coli, UTI, Pt s/p trach, PEG, c LUE VTE c cellulitis, infected left hand hematoma, s/p debridement, sacral ulcer, s/p debridement, new onset seizure, DM, normocytic anemia, d/c plan for placement noted.  PMH include COPD, CHF, DM( was on Jardiance, Metformin, Victoza pen, Insulin Lispro 10 units, 2 x day, and Insulin Lantus 20 units per medication list provided by son on 11/16), HTN, HLD, CAD, CABG, CKD, CVA, BPH.      Factors impacting intake: [ ] none [ ] nausea  [ ] vomiting [ ] diarrhea [ ] constipation  [ ]chewing problems [ ] swallowing issues  [ x] other: tolerating G-Tube feeding     Diet Prescription: Diet, NPO with Tube Feed:   Tube Feeding Modality: Gastrostomy  Glucerna 1.2 Pedrito  Total Volume for 24 Hours (mL): 1440  Continuous  Until Goal Tube Feed Rate (mL per Hour): 60  Tube Feed Duration (in Hours): 70=8739 pedrito, 86 gm pro, 1166 mL free water, 120% RDIs  Tube Feed Start Time: 12:15  Free Water Flush Instructions:  automatic water flus via pump @ 20 ml/hr (12-23-22 @ 12:08)=480 ml free water     Intake: Glucerna 1.2 @ goal rate of 60 mL/hr x 24 hrs     Current Weight: Current wt. not available.  12/19, 78.8 kg, 12/02, 86.2 kg, 11/14, 82.2 kg, c wt. loss 3.4 kg   % Weight Change: 4.1%(in 1 month)  01/07, 1+ edema of feet noted(decrease in edema noted from previous documentation)    Nutrition focused physical exam conducted; Subcutaneous fat Exam;  [ Mild  ]  Orbital fat pads region,  [ WNL  ]Buccal fat region,  [ Moderate   ]triceps region, [  -]ribs region.  Muscle Exam; [ Mild  ]temples region, [ Mild   ]clavicle region, [ Mild  ]shoulder region, [ - ]Scapula region, [ Moderate   ]Interosseous region, [ WNL  ]thigh region, [ Mild  ]Calf region      Pertinent Medications: MEDICATIONS  (STANDING):  apixaban 5 milliGRAM(s) Oral every 12 hours  aspirin  chewable 81 milliGRAM(s) Oral daily  atorvastatin 20 milliGRAM(s) Oral at bedtime  bacitracin   Ointment 1 Application(s) Topical two times a day  budesonide 160 MICROgram(s)/formoterol 4.5 MICROgram(s) Inhaler 2 Puff(s) Inhalation two times a day  chlorhexidine 0.12% Liquid 15 milliLiter(s) Oral Mucosa every 12 hours  chlorhexidine 2% Cloths 1 Application(s) Topical <User Schedule>  collagenase Ointment 1 Application(s) Topical two times a day  cyanocobalamin 1000 MICROGram(s) Oral daily  diphenhydrAMINE Injectable 50 milliGRAM(s) IV Push once  folic acid 1 milliGRAM(s) Oral daily  glycopyrrolate 1 milliGRAM(s) Oral two times a day  insulin glargine Injectable (LANTUS) 20 Unit(s) SubCutaneous every morning  insulin lispro (ADMELOG) corrective regimen sliding scale   SubCutaneous every 6 hours  levETIRAcetam 1500 milliGRAM(s) Oral two times a day  metoprolol tartrate 25 milliGRAM(s) Oral two times a day  polyethylene glycol 3350 17 Gram(s) Oral daily  senna 2 Tablet(s) Oral at bedtime  silver sulfADIAZINE 1% Cream 1 Application(s) Topical two times a day    MEDICATIONS  (PRN):  acetaminophen     Tablet .. 650 milliGRAM(s) Oral every 6 hours PRN Temp greater or equal to 38C (100.4F), Mild Pain (1 - 3)  albuterol    90 MICROgram(s) HFA Inhaler 2 Puff(s) Inhalation every 6 hours PRN Shortness of Breath and/or Wheezing  aluminum hydroxide/magnesium hydroxide/simethicone Suspension 30 milliLiter(s) Oral every 4 hours PRN Dyspepsia    Pertinent Labs: 01-09 Na139 mmol/L Glu 117 mg/dL<H> K+ 4.0 mmol/L Cr  0.99 mg/dL BUN 41 mg/dL<H> 01-08 Phos 3.8 mg/dL 01-09 Alb 2.2 g/dL<L>01-09 ALT 28 U/L AST 22 U/L Alkaline Phosphatase 87 U/L  11/14, A1C=8.8% <H>     CAPILLARY BLOOD GLUCOSE      POCT Blood Glucose.: 192 mg/dL (09 Jan 2023 10:55)  POCT Blood Glucose.: 168 mg/dL (09 Jan 2023 08:17)  POCT Blood Glucose.: 112 mg/dL (09 Jan 2023 06:50)  POCT Blood Glucose.: 199 mg/dL (09 Jan 2023 00:29)  POCT Blood Glucose.: 135 mg/dL (08 Jan 2023 16:50)    Skin:   01/09, WDL except pressure injury, 01/08, IAD, 12/02, skin tear, right buttock  Pressure ulcer x 1  1. pressure ulcer; sacrum; stage III        Estimated Needs:   [ ] no change since previous assessment  [x ] recalculated: for fluid due to decrease in edema, pressure ulcer  IBW: 61.6 kg  Estimated Energy Needs: 1313-4700 calories(25-30 Kcal/kg IBW)  Estimated Protein Needs: 74-92 grams protein(1.2 -1.5 gram protein/kg IBW)  Estimated Fluid Needs: 5204-9123 ml(25-30 ml /kg IBW_    Previous New Nutrition Diagnosis: (12/23)  [x ] Malnutrition; moderate malnutrition in context of acute illness     Related to: increased protein energy needs in setting of cardiac arrest, acute respiratory failure, s/p ELMER, shock liver, pressure ulcers/wounds    As evidenced by: mild muscle/fat loss  addendum; 01/09, moderate fat/muscle loss noted     Goal: pt to meet >75% protein-energy needs via tolerated route; met    Nutrition Diagnosis is [x ] ongoing  [ ] resolved [ ] not applicable       New Nutrition Diagnosis: [x ] not applicable     Interventions:   Recommend  [ ] Change Diet To:  [ ] Nutrition Supplement  [x] Nutrition Support; continue c Glucerna 1.2 @ 60 ml/hr + add Liquid protein supplement 1 pkg=15 grams protein, 100 calories via G-Tube  [x ] Other: increase automatic water flushes to 30 ml/ex=750 ml free water flush daily         Monitoring and Evaluation:   [ ] PO intake [ x ] Tolerance to diet prescription [ x ] weights [ x ] labs; bun, Glucose [ x ] follow up per protocol  [ ] other:

## 2023-01-09 NOTE — PROGRESS NOTE ADULT - ASSESSMENT
REVIEW OF SYMPTOMS      Able to give (reliable) ROS  NO     PHYSICAL EXAM    HEENT Unremarkable  atraumatic   RESP Fair air entry EXP prolonged    Harsh breath sound Resp distres mild   CARDIAC S1 S2 No S3     NO JVD    ABDOMEN SOFT BS PRESENT NOT DISTENDED No hepatosplenomegaly   PEDAL EDEMA present No calf tenderness  NO rash       GENERAL DATA .   GOC.  12/11/2022 full code       ALLGY.  nka                            WT. ..  12/2/2022 86  BMI. ..    12/2/2022 29                          ICU STAY.  .. 11/29-12/9  COVID.   .. 12/2/2022 scv2 (-)   .. 11/20/2022 scv2 (-)   BEST PRACTICE ISSUES.    HOB ELEVATN. Yes  DVT PPLX.    .. 1/3/2023 apixa 5.2 (vte)  12/12 l Subclav throm  ALEJO PPLX.   ..      INFN PPLX.   .. 11/25 chlorhex .12%   .. 11/14 chlorhex 2%   SP SW ANTWAN.         DIET.    ..  12/15 glucerna 1.2 1440 gt   IV fl.  ..            PROCEDURES.  .. 12/19 debridement of sacral wound   .. 12/5/2022 trach  .. 12/5/2022 peg   .. 12/12 r arm midline       ABGS.  1/6/2023 ac 16/450/.3/5 746/49/123   12/3/2022 ac 14/450/.3/5 750/46/75      VS/ PO/IO/ VENT/ DRIPS.  1/9/2023 70 120/70   1/9/2023 ac 14/450/5/35    PATIENT PRESENTATION.  72 m doa 11/14/2022 cac    PMH  PMH CAD s/p PCI with stent 2015 and CABG 2014,   PMH  s/p medtronic PPM,   PMH CVA (lacunar infarct)    HOSPITAL COURSE   .. 1) PEA arrest with ROSC after approximately 5 min 11/14  Now communicative   .. 2) DVT 12/12 l Subclav thromS 12/15/2022 lvnx 80.2 1/3 changed to apixaban 5.2  .. 3) TRACH 12/5/2022 trach  .. 4) PEG12/5/2022 peg   .. 5) Cellulitis hand absc 12/13 mod enterococcus fecalis  staph epi 12/12-12/15/2022  cephalexin 12/15 vanco once  12/16-12/19 zosyn  .. 6) Vent weaning     PROBLEM ASSESSMENT RECOMMENDATIONS.  WEANING   .. wean as tolerated  .. 1/4/2022  If able to tolerate cpap 5/5 x 2 h will place on tc   .. 1/5/2023 DW Dr Valdovinos Tried on 35% tc   .. 1/6/2023 DW RT Pt to be on monitor during weaning trials and to be placed on vent at night and trach collar during daytime as tolerated   .. 1/7/2023 above reinforced to RT   .. 1/8/2023 placed on trach collar but has lot of secretns lasted 45 min   TRACH LEAK  .. 1/9/2023 Notified by Hospitalist that pt is losing volunmes and that she is calling surgery to see if trach balloon has leak  CHF   .. 11/14/2022 echo mod decr segmental lvsf apical lateral apiccal ant segment are abn takotsubo cmpthy pasp 42 .  .. Monitor for chf has chr hfref per echo   COPD   .. 12/30 symbicort 160   .. 1/7/2023 glycopyrrolate 1.2   .. 1/9/2023 ipratropium  .. continue bd ics for copd   sp cac   .. good neuro recovery awake alert interactive   VTE  .. 1/3/2023 apixa 5.2 (vte)        TIME SPENT   Over 25 minutes aggregate care time spent on encounter; activities included   direct patient care, counseling and/or coordinating care reviewing notes, lab data/ imaging , discussion with multidisciplinary team/ patient  /family and explaining in detail risks, benefits, alternatives  of the recommendations     BRANDON CAMARILLO

## 2023-01-10 LAB
BASE EXCESS BLDA CALC-SCNC: 8.2 MMOL/L — HIGH (ref -2–3)
BLOOD GAS COMMENTS ARTERIAL: SIGNIFICANT CHANGE UP
CO2 BLDA-SCNC: 31 MMOL/L — HIGH (ref 19–24)
GAS PNL BLDA: SIGNIFICANT CHANGE UP
GLUCOSE BLDC GLUCOMTR-MCNC: 144 MG/DL — HIGH (ref 70–99)
GLUCOSE BLDC GLUCOMTR-MCNC: 147 MG/DL — HIGH (ref 70–99)
GLUCOSE BLDC GLUCOMTR-MCNC: 164 MG/DL — HIGH (ref 70–99)
GLUCOSE BLDC GLUCOMTR-MCNC: 180 MG/DL — HIGH (ref 70–99)
GLUCOSE BLDC GLUCOMTR-MCNC: 81 MG/DL — SIGNIFICANT CHANGE UP (ref 70–99)
HCO3 BLDA-SCNC: 30 MMOL/L — HIGH (ref 21–28)
HOROWITZ INDEX BLDA+IHG-RTO: 35 — SIGNIFICANT CHANGE UP
PCO2 BLDA: 32 MMHG — SIGNIFICANT CHANGE UP (ref 32–46)
PH BLDA: 7.58 — HIGH (ref 7.35–7.45)
PO2 BLDA: 154 MMHG — HIGH (ref 83–108)
SAO2 % BLDA: 99.3 % — HIGH (ref 94–98)

## 2023-01-10 PROCEDURE — 99232 SBSQ HOSP IP/OBS MODERATE 35: CPT

## 2023-01-10 RX ORDER — CHLORHEXIDINE GLUCONATE 213 G/1000ML
15 SOLUTION TOPICAL EVERY 12 HOURS
Refills: 0 | Status: DISCONTINUED | OUTPATIENT
Start: 2023-01-10 | End: 2023-01-26

## 2023-01-10 RX ADMIN — APIXABAN 5 MILLIGRAM(S): 2.5 TABLET, FILM COATED ORAL at 17:45

## 2023-01-10 RX ADMIN — Medication 25 MILLIGRAM(S): at 17:41

## 2023-01-10 RX ADMIN — POLYETHYLENE GLYCOL 3350 17 GRAM(S): 17 POWDER, FOR SOLUTION ORAL at 11:28

## 2023-01-10 RX ADMIN — SENNA PLUS 2 TABLET(S): 8.6 TABLET ORAL at 21:47

## 2023-01-10 RX ADMIN — APIXABAN 5 MILLIGRAM(S): 2.5 TABLET, FILM COATED ORAL at 05:31

## 2023-01-10 RX ADMIN — Medication 3 MILLILITER(S): at 00:21

## 2023-01-10 RX ADMIN — CHLORHEXIDINE GLUCONATE 1 APPLICATION(S): 213 SOLUTION TOPICAL at 05:32

## 2023-01-10 RX ADMIN — Medication 3 MILLILITER(S): at 11:37

## 2023-01-10 RX ADMIN — BUDESONIDE AND FORMOTEROL FUMARATE DIHYDRATE 2 PUFF(S): 160; 4.5 AEROSOL RESPIRATORY (INHALATION) at 17:09

## 2023-01-10 RX ADMIN — Medication 1 APPLICATION(S): at 17:30

## 2023-01-10 RX ADMIN — Medication 1 APPLICATION(S): at 17:42

## 2023-01-10 RX ADMIN — Medication 1 APPLICATION(S): at 05:43

## 2023-01-10 RX ADMIN — ROBINUL 1 MILLIGRAM(S): 0.2 INJECTION INTRAMUSCULAR; INTRAVENOUS at 05:31

## 2023-01-10 RX ADMIN — Medication 1 APPLICATION(S): at 05:32

## 2023-01-10 RX ADMIN — LEVETIRACETAM 1500 MILLIGRAM(S): 250 TABLET, FILM COATED ORAL at 05:31

## 2023-01-10 RX ADMIN — Medication 3 MILLILITER(S): at 05:03

## 2023-01-10 RX ADMIN — PREGABALIN 1000 MICROGRAM(S): 225 CAPSULE ORAL at 15:01

## 2023-01-10 RX ADMIN — CHLORHEXIDINE GLUCONATE 15 MILLILITER(S): 213 SOLUTION TOPICAL at 17:40

## 2023-01-10 RX ADMIN — Medication 3 MILLILITER(S): at 17:08

## 2023-01-10 RX ADMIN — Medication 81 MILLIGRAM(S): at 11:22

## 2023-01-10 RX ADMIN — Medication 1 APPLICATION(S): at 05:31

## 2023-01-10 RX ADMIN — Medication 2: at 11:21

## 2023-01-10 RX ADMIN — INSULIN GLARGINE 20 UNIT(S): 100 INJECTION, SOLUTION SUBCUTANEOUS at 07:48

## 2023-01-10 RX ADMIN — Medication 1 MILLIGRAM(S): at 11:23

## 2023-01-10 RX ADMIN — LEVETIRACETAM 1500 MILLIGRAM(S): 250 TABLET, FILM COATED ORAL at 17:38

## 2023-01-10 RX ADMIN — CHLORHEXIDINE GLUCONATE 15 MILLILITER(S): 213 SOLUTION TOPICAL at 05:32

## 2023-01-10 RX ADMIN — BUDESONIDE AND FORMOTEROL FUMARATE DIHYDRATE 2 PUFF(S): 160; 4.5 AEROSOL RESPIRATORY (INHALATION) at 05:04

## 2023-01-10 RX ADMIN — ROBINUL 1 MILLIGRAM(S): 0.2 INJECTION INTRAMUSCULAR; INTRAVENOUS at 17:45

## 2023-01-10 RX ADMIN — ATORVASTATIN CALCIUM 20 MILLIGRAM(S): 80 TABLET, FILM COATED ORAL at 21:46

## 2023-01-10 NOTE — PROGRESS NOTE ADULT - ASSESSMENT
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 Unknown how long pt unresponsive for at home. Blood sugar in the 40s with EMS s/p glucagon. On arrival to ER pt hypothermic and agonally breathing. Pt became unresponsive with loss of pulse; ACLS initiated. PEA arrest with ROSC after approximately 5 min s/p Epi x2.  Pt intubated during CODE BLUE. Per son pt on the day prior to presentation reported low blood sugar reads on his glucometer in the range of 80s. Son believes pt continued to take his insulin and oral diabetic regimen. Son states that pt was eating but may have been eating less in the last few days. Later on pt noted to have tonic-clonic Sz.     Problem/Plan-1:  Problem: PEA arrest;s/p Epi x 2. ROSC achieved  CAD/ CHF history:  Takotsubo cardiomyopathy found on Echo   stable  cont current meds. entresto at discharge per cards.     Problem/Plan-2:  Problem: Acute hypoxemic respiratory failure now chronic  s/p intubation , failed extubation, now trach to vent  c/w vent management, trach collar in daytime   cont current bronchodilators. discussed with pulmonologist   Rashi leaking , asked sx to check    Problem/Plan-3:  Problem: LUE VTE with enterococcal faecalis cellulitis /infected left hand hematoma s/p bedside debridement 12/13   LUE edema and wound of left hand ; good radial pulse   --LUE venous duplex : occlusive thrombus in the left mid subclavian vein with partial   slow flow detected in the lateral subclavian vein.  --LUE arterial Duplex neg   --change lovenox to eliquis 5 mg bid.   --12/13 Hand surgery consult appreciated, s/p bedside debridement.   --antibiotic course completed       Problem/Plan-5:  Problem: New onset Seizure likely due to anoxic brain injury  cont keppra as per colleague discussion with Dr. Loo.       Problem/Plan-6:  Problem: ELMER 2 to ATN  resolved    Problem/Plan-7:  Problem: Shock liver.    due to PEA arrest. resolved    Problem/Plan-8:  Problem:DM2   A1c 8.8%  continue with current management   FS goal 140-180   continue with PEG feeds     Problem/Plan-9:  Problem: Hypophosphatemia --repleted     Problem/Plan-10:  Problem: Ustageable sacral pressure wound   s/p debridement 12/19    Problem/Plan-11:  Problem: Normocytic anemia   no e/o bleeding or bruising   H/H stable   low normal Vit B12 and folic acid --supplement       Moderate PCM and dysphagia: PEG tube feeding.     Stage 3 sacral ulcer: Clean, continue wound care    Preventative measures   dvt ppx>>>on DOAC  fall, aspiration  precautions  Dipso: Patient has no insurance and Family in process of getting guardianship. Patient will then need placement.

## 2023-01-10 NOTE — PROGRESS NOTE ADULT - SUBJECTIVE AND OBJECTIVE BOX
Patient is a 72y old  Male who presents with a chief complaint of s/p cardiac arrest, hypoglycemia (10 Brando 2023 10:35)      INTERVAL HPI/OVERNIGHT EVENTS:  Pt was seen and examined, no acute events.      MEDICATIONS  (STANDING):  albuterol/ipratropium for Nebulization 3 milliLiter(s) Nebulizer every 6 hours  apixaban 5 milliGRAM(s) Oral every 12 hours  aspirin  chewable 81 milliGRAM(s) Oral daily  atorvastatin 20 milliGRAM(s) Oral at bedtime  bacitracin   Ointment 1 Application(s) Topical two times a day  budesonide 160 MICROgram(s)/formoterol 4.5 MICROgram(s) Inhaler 2 Puff(s) Inhalation two times a day  chlorhexidine 0.12% Liquid 15 milliLiter(s) Oral Mucosa every 12 hours  chlorhexidine 2% Cloths 1 Application(s) Topical <User Schedule>  collagenase Ointment 1 Application(s) Topical two times a day  cyanocobalamin 1000 MICROGram(s) Oral daily  diphenhydrAMINE Injectable 50 milliGRAM(s) IV Push once  folic acid 1 milliGRAM(s) Oral daily  glycopyrrolate 1 milliGRAM(s) Oral two times a day  insulin glargine Injectable (LANTUS) 20 Unit(s) SubCutaneous every morning  insulin lispro (ADMELOG) corrective regimen sliding scale   SubCutaneous every 6 hours  levETIRAcetam 1500 milliGRAM(s) Oral two times a day  metoprolol tartrate 25 milliGRAM(s) Oral two times a day  polyethylene glycol 3350 17 Gram(s) Oral daily  senna 2 Tablet(s) Oral at bedtime  silver sulfADIAZINE 1% Cream 1 Application(s) Topical two times a day    MEDICATIONS  (PRN):  acetaminophen     Tablet .. 650 milliGRAM(s) Oral every 6 hours PRN Temp greater or equal to 38C (100.4F), Mild Pain (1 - 3)  albuterol    90 MICROgram(s) HFA Inhaler 2 Puff(s) Inhalation every 6 hours PRN Shortness of Breath and/or Wheezing  aluminum hydroxide/magnesium hydroxide/simethicone Suspension 30 milliLiter(s) Oral every 4 hours PRN Dyspepsia      Allergies  No Known Allergies        Vital Signs Last 24 Hrs  T(C): 36.9 (10 Brando 2023 10:44), Max: 37.5 (09 Jan 2023 23:53)  T(F): 98.4 (10 Brando 2023 10:44), Max: 99.5 (09 Jan 2023 23:53)  HR: 61 (10 Brando 2023 11:56) (60 - 79)  BP: 110/62 (10 Brando 2023 10:44) (95/57 - 121/74)  BP(mean): --  RR: 18 (10 Brando 2023 10:44) (17 - 19)  SpO2: 100% (10 Brando 2023 11:56) (100% - 100%)    Parameters below as of 10 Brando 2023 11:50  Patient On (Oxygen Delivery Method): ventilator        PHYSICAL EXAM:  GENERAL: Obese, NAD  CHEST/LUNG: Decreased air entry bibasally. no wheezing. s/p trach , on trach collar today  HEART: Regular rate and rhythm; + murmur  ABDOMEN: Soft, Nontender, Nondistended; Bowel sounds present.s/p PEG  EXTREMITIES:  Moving all four extremities spontaneously, No clubbing, cyanosis, or edema. localized left hand dorsal surface non tender likely chronic hematoma mass.   NERVOUS SYSTEM:  Grossly non focal. followed simple commands. communicating with writing   Psychiatry: AAO x 3, mood is appropriate         LABS:                        9.2    7.38  )-----------( 220      ( 09 Jan 2023 07:10 )             29.7     01-09    139  |  104  |  41<H>  ----------------------------<  117<H>  4.0   |  32<H>  |  0.99    Ca    8.6      09 Jan 2023 07:10    TPro  6.0  /  Alb  2.2<L>  /  TBili  0.9  /  DBili  x   /  AST  22  /  ALT  28  /  AlkPhos  87  01-09        CAPILLARY BLOOD GLUCOSE      POCT Blood Glucose.: 180 mg/dL (10 Brando 2023 10:48)  POCT Blood Glucose.: 164 mg/dL (10 Brando 2023 07:31)  POCT Blood Glucose.: 144 mg/dL (10 Brando 2023 05:30)  POCT Blood Glucose.: 165 mg/dL (09 Jan 2023 23:45)  POCT Blood Glucose.: 83 mg/dL (09 Jan 2023 16:21)      RADIOLOGY & ADDITIONAL TESTS:    Imaging Personally Reviewed:  [ ] YES  [ ] NO    Consultant(s) Notes Reviewed:  [ ] YES  [ ] NO    Care Discussed with Consultants/Other Providers [ ] YES  [ ] NO

## 2023-01-10 NOTE — PROGRESS NOTE ADULT - SUBJECTIVE AND OBJECTIVE BOX
BRANDON CAMARILLO    [address] [address]    Allergies    No Known Allergies    Intolerances        PAST MEDICAL & SURGICAL HISTORY:  HTN (hypertension)      HLD (hyperlipidemia)      Diabetes mellitus      Lacunar infarction      BPH (benign prostatic hyperplasia)      CHF (congestive heart failure)      Chronic obstructive pulmonary disease, unspecified COPD type      S/P CABG (coronary artery bypass graft)  2014          FAMILY HISTORY:      Home Medications:  aspirin 81 mg oral delayed release tablet: 1 tab(s) orally once a day (12 Jun 2020 17:35)  Liquifilm Tears preserved ophthalmic solution: 1 drop(s) to each affected eye 2 times a day (12 Jun 2020 17:35)      MEDICATIONS  (STANDING):  albuterol/ipratropium for Nebulization 3 milliLiter(s) Nebulizer every 6 hours  apixaban 5 milliGRAM(s) Oral every 12 hours  aspirin  chewable 81 milliGRAM(s) Oral daily  atorvastatin 20 milliGRAM(s) Oral at bedtime  bacitracin   Ointment 1 Application(s) Topical two times a day  budesonide 160 MICROgram(s)/formoterol 4.5 MICROgram(s) Inhaler 2 Puff(s) Inhalation two times a day  chlorhexidine 0.12% Liquid 15 milliLiter(s) Oral Mucosa every 12 hours  chlorhexidine 2% Cloths 1 Application(s) Topical <User Schedule>  collagenase Ointment 1 Application(s) Topical two times a day  cyanocobalamin 1000 MICROGram(s) Oral daily  diphenhydrAMINE Injectable 50 milliGRAM(s) IV Push once  folic acid 1 milliGRAM(s) Oral daily  glycopyrrolate 1 milliGRAM(s) Oral two times a day  insulin glargine Injectable (LANTUS) 20 Unit(s) SubCutaneous every morning  insulin lispro (ADMELOG) corrective regimen sliding scale   SubCutaneous every 6 hours  levETIRAcetam 1500 milliGRAM(s) Oral two times a day  metoprolol tartrate 25 milliGRAM(s) Oral two times a day  polyethylene glycol 3350 17 Gram(s) Oral daily  senna 2 Tablet(s) Oral at bedtime  silver sulfADIAZINE 1% Cream 1 Application(s) Topical two times a day    MEDICATIONS  (PRN):  acetaminophen     Tablet .. 650 milliGRAM(s) Oral every 6 hours PRN Temp greater or equal to 38C (100.4F), Mild Pain (1 - 3)  albuterol    90 MICROgram(s) HFA Inhaler 2 Puff(s) Inhalation every 6 hours PRN Shortness of Breath and/or Wheezing  aluminum hydroxide/magnesium hydroxide/simethicone Suspension 30 milliLiter(s) Oral every 4 hours PRN Dyspepsia      Diet, NPO with Tube Feed:   Tube Feeding Modality: Gastrostomy  Glucerna 1.2 Pedrito  Total Volume for 24 Hours (mL): 1440  Continuous  Until Goal Tube Feed Rate (mL per Hour): 60  Tube Feed Duration (in Hours): 24  Tube Feed Start Time: 14:30  Free Water Flush  Free Water Flush Instructions:  30 ml/hr via pump  Liquid Protein Supplement     Qty per Day:  1 (01-09-23 @ 13:45) [Active]          Vital Signs Last 24 Hrs  T(C): 37.2 (10 Brando 2023 04:36), Max: 37.5 (09 Jan 2023 23:53)  T(F): 99 (10 Brando 2023 04:36), Max: 99.5 (09 Jan 2023 23:53)  HR: 65 (10 Brando 2023 08:51) (60 - 82)  BP: 95/57 (10 Brando 2023 04:36) (95/57 - 121/74)  BP(mean): --  RR: 17 (10 Brando 2023 04:36) (17 - 19)  SpO2: 100% (10 Brando 2023 08:51) (100% - 100%)    Parameters below as of 10 Brando 2023 04:36  Patient On (Oxygen Delivery Method): ventilator          01-09-23 @ 07:01  -  01-10-23 @ 07:00  --------------------------------------------------------  IN: 1040 mL / OUT: 400 mL / NET: 640 mL        Mode: AC/ CMV (Assist Control/ Continuous Mandatory Ventilation), RR (machine): 14, TV (machine): 450, FiO2: 35, PEEP: 5, ITime: 1, MAP: 8, PIP: 20      LABS:                        9.2    7.38  )-----------( 220      ( 09 Jan 2023 07:10 )             29.7     01-09    139  |  104  |  41<H>  ----------------------------<  117<H>  4.0   |  32<H>  |  0.99    Ca    8.6      09 Jan 2023 07:10    TPro  6.0  /  Alb  2.2<L>  /  TBili  0.9  /  DBili  x   /  AST  22  /  ALT  28  /  AlkPhos  87  01-09          ABG - ( 10 Brando 2023 05:05 )  pH, Arterial: 7.58  pH, Blood: x     /  pCO2: 32    /  pO2: 154   / HCO3: 30    / Base Excess: 8.2   /  SaO2: 99.3                WBC:  WBC Count: 7.38 K/uL (01-09 @ 07:10)      MICROBIOLOGY:  RECENT CULTURES:                  Sodium:  Sodium, Serum: 139 mmol/L (01-09 @ 07:10)      0.99 mg/dL 01-09 @ 07:10      Hemoglobin:  Hemoglobin: 9.2 g/dL (01-09 @ 07:10)      Platelets: Platelet Count - Automated: 220 K/uL (01-09 @ 07:10)      LIVER FUNCTIONS - ( 09 Jan 2023 07:10 )  Alb: 2.2 g/dL / Pro: 6.0 gm/dL / ALK PHOS: 87 U/L / ALT: 28 U/L / AST: 22 U/L / GGT: x                 RADIOLOGY & ADDITIONAL STUDIES:      MICROBIOLOGY:  RECENT CULTURES:

## 2023-01-10 NOTE — PROGRESS NOTE ADULT - ASSESSMENT
REVIEW OF SYMPTOMS      Able to give (reliable) ROS  NO     PHYSICAL EXAM    HEENT Unremarkable  atraumatic   RESP Fair air entry EXP prolonged    Harsh breath sound Resp distres mild   CARDIAC S1 S2 No S3     NO JVD    ABDOMEN SOFT BS PRESENT NOT DISTENDED No hepatosplenomegaly   PEDAL EDEMA present No calf tenderness  NO rash       GENERAL DATA .   GOC.  12/11/2022 full code       ALLGY.  nka                            WT. ..  12/2/2022 86  BMI. ..    12/2/2022 29                          ICU STAY.  .. 11/29-12/9  COVID.   .. 12/2/2022 scv2 (-)   .. 11/20/2022 scv2 (-)   BEST PRACTICE ISSUES.    HOB ELEVATN. Yes  DVT PPLX.    .. 1/3/2023 apixa 5.2 (vte)  12/12 l Subclav throm  ALEJO PPLX.   ..      INFN PPLX.   .. 11/25 chlorhex .12%   .. 11/14 chlorhex 2%   SP SW ANTWAN.         DIET.    ..  12/15 glucerna 1.2 1440 gt   IV fl.  ..            PROCEDURES.  .. 12/19 debridement of sacral wound   .. 12/5/2022 trach  .. 12/5/2022 peg   .. 12/12 r arm midline       ABGS.  1/6/2023 ac 16/450/.3/5 746/49/123   12/3/2022 ac 14/450/.3/5 750/46/75      VS/ PO/IO/ VENT/ DRIPS.  1/10/2023 afeb 60 110/70   1/10/2023 ac 14/450/5/.35     PATIENT PRESENTATION.  72 m doa 11/14/2022 cac    PMH  PMH CAD s/p PCI with stent 2015 and CABG 2014,   PMH  s/p medtronic PPM,   PMH CVA (lacunar infarct)    HOSPITAL COURSE   .. 1) PEA arrest with ROSC after approximately 5 min 11/14  Now communicative   .. 2) DVT 12/12 l Subclav thromS 12/15/2022 lvnx 80.2 1/3 changed to apixaban 5.2  .. 3) TRACH 12/5/2022 trach  .. 4) PEG12/5/2022 peg   .. 5) Cellulitis hand absc 12/13 mod enterococcus fecalis  staph epi 12/12-12/15/2022  cephalexin 12/15 vanco once  12/16-12/19 zosyn  .. 6) Vent weaning     PROBLEM ASSESSMENT RECOMMENDATIONS.  WEANING   .. wean as tolerated  .. 1/4/2022  If able to tolerate cpap 5/5 x 2 h will place on tc   .. 1/5/2023 DW Dr Valdovinos Tried on 35% tc   .. 1/6/2023 DW RT Pt to be on monitor during weaning trials and to be placed on vent at night and trach collar during daytime as tolerated   .. 1/7/2023 above reinforced to RT   .. 1/8/2023 placed on trach collar but has lot of secretns lasted 45 min   TRACH LEAK  .. 1/9/2023 Notified by Hospitalist that pt is losing volunmes and that she is calling surgery to see if trach balloon has leak  CHF   .. 11/14/2022 echo mod decr segmental lvsf apical lateral apiccal ant segment are abn takotsubo cmpthy pasp 42 .  .. Monitor for chf has chr hfref per echo   COPD   .. 12/30 symbicort 160   .. 1/7/2023 glycopyrrolate 1.2   .. 1/9/2023 ipratropium  .. continue bd ics for copd   sp cac   .. good neuro recovery awake alert interactive   VTE  .. 1/3/2023 apixa 5.2 (vte)        TIME SPENT   Over 25 minutes aggregate care time spent on encounter; activities included   direct patient care, counseling and/or coordinating care reviewing notes, lab data/ imaging , discussion with multidisciplinary team/ patient  /family and explaining in detail risks, benefits, alternatives  of the recommendations     BRANDON CAMARILLO

## 2023-01-11 LAB
ALBUMIN SERPL ELPH-MCNC: 2.3 G/DL — LOW (ref 3.3–5)
ALP SERPL-CCNC: 84 U/L — SIGNIFICANT CHANGE UP (ref 40–120)
ALT FLD-CCNC: 27 U/L — SIGNIFICANT CHANGE UP (ref 12–78)
ANION GAP SERPL CALC-SCNC: 6 MMOL/L — SIGNIFICANT CHANGE UP (ref 5–17)
AST SERPL-CCNC: 19 U/L — SIGNIFICANT CHANGE UP (ref 15–37)
BILIRUB SERPL-MCNC: 0.3 MG/DL — SIGNIFICANT CHANGE UP (ref 0.2–1.2)
BUN SERPL-MCNC: 32 MG/DL — HIGH (ref 7–23)
CALCIUM SERPL-MCNC: 8.8 MG/DL — SIGNIFICANT CHANGE UP (ref 8.5–10.1)
CHLORIDE SERPL-SCNC: 108 MMOL/L — SIGNIFICANT CHANGE UP (ref 96–108)
CO2 SERPL-SCNC: 30 MMOL/L — SIGNIFICANT CHANGE UP (ref 22–31)
CREAT SERPL-MCNC: 0.93 MG/DL — SIGNIFICANT CHANGE UP (ref 0.5–1.3)
EGFR: 86 ML/MIN/1.73M2 — SIGNIFICANT CHANGE UP
GLUCOSE BLDC GLUCOMTR-MCNC: 107 MG/DL — HIGH (ref 70–99)
GLUCOSE BLDC GLUCOMTR-MCNC: 129 MG/DL — HIGH (ref 70–99)
GLUCOSE BLDC GLUCOMTR-MCNC: 142 MG/DL — HIGH (ref 70–99)
GLUCOSE BLDC GLUCOMTR-MCNC: 159 MG/DL — HIGH (ref 70–99)
GLUCOSE BLDC GLUCOMTR-MCNC: 162 MG/DL — HIGH (ref 70–99)
GLUCOSE BLDC GLUCOMTR-MCNC: 164 MG/DL — HIGH (ref 70–99)
GLUCOSE SERPL-MCNC: 125 MG/DL — HIGH (ref 70–99)
HCT VFR BLD CALC: 30.4 % — LOW (ref 39–50)
HGB BLD-MCNC: 9.8 G/DL — LOW (ref 13–17)
MCHC RBC-ENTMCNC: 30.5 PG — SIGNIFICANT CHANGE UP (ref 27–34)
MCHC RBC-ENTMCNC: 32.2 G/DL — SIGNIFICANT CHANGE UP (ref 32–36)
MCV RBC AUTO: 94.7 FL — SIGNIFICANT CHANGE UP (ref 80–100)
NRBC # BLD: 0 /100 WBCS — SIGNIFICANT CHANGE UP (ref 0–0)
PLATELET # BLD AUTO: 230 K/UL — SIGNIFICANT CHANGE UP (ref 150–400)
POTASSIUM SERPL-MCNC: 3.9 MMOL/L — SIGNIFICANT CHANGE UP (ref 3.5–5.3)
POTASSIUM SERPL-SCNC: 3.9 MMOL/L — SIGNIFICANT CHANGE UP (ref 3.5–5.3)
PROT SERPL-MCNC: 6.3 GM/DL — SIGNIFICANT CHANGE UP (ref 6–8.3)
RBC # BLD: 3.21 M/UL — LOW (ref 4.2–5.8)
RBC # FLD: 14.9 % — HIGH (ref 10.3–14.5)
SODIUM SERPL-SCNC: 144 MMOL/L — SIGNIFICANT CHANGE UP (ref 135–145)
WBC # BLD: 7.13 K/UL — SIGNIFICANT CHANGE UP (ref 3.8–10.5)
WBC # FLD AUTO: 7.13 K/UL — SIGNIFICANT CHANGE UP (ref 3.8–10.5)

## 2023-01-11 PROCEDURE — 99232 SBSQ HOSP IP/OBS MODERATE 35: CPT

## 2023-01-11 RX ADMIN — Medication 2: at 13:03

## 2023-01-11 RX ADMIN — BUDESONIDE AND FORMOTEROL FUMARATE DIHYDRATE 2 PUFF(S): 160; 4.5 AEROSOL RESPIRATORY (INHALATION) at 05:09

## 2023-01-11 RX ADMIN — Medication 3 MILLILITER(S): at 11:08

## 2023-01-11 RX ADMIN — ROBINUL 1 MILLIGRAM(S): 0.2 INJECTION INTRAMUSCULAR; INTRAVENOUS at 05:58

## 2023-01-11 RX ADMIN — Medication 3 MILLILITER(S): at 17:05

## 2023-01-11 RX ADMIN — Medication 1 APPLICATION(S): at 05:58

## 2023-01-11 RX ADMIN — SENNA PLUS 2 TABLET(S): 8.6 TABLET ORAL at 20:50

## 2023-01-11 RX ADMIN — APIXABAN 5 MILLIGRAM(S): 2.5 TABLET, FILM COATED ORAL at 05:58

## 2023-01-11 RX ADMIN — Medication 1 APPLICATION(S): at 06:24

## 2023-01-11 RX ADMIN — Medication 25 MILLIGRAM(S): at 05:58

## 2023-01-11 RX ADMIN — Medication 1 APPLICATION(S): at 17:36

## 2023-01-11 RX ADMIN — LEVETIRACETAM 1500 MILLIGRAM(S): 250 TABLET, FILM COATED ORAL at 05:58

## 2023-01-11 RX ADMIN — LEVETIRACETAM 1500 MILLIGRAM(S): 250 TABLET, FILM COATED ORAL at 17:37

## 2023-01-11 RX ADMIN — CHLORHEXIDINE GLUCONATE 15 MILLILITER(S): 213 SOLUTION TOPICAL at 05:57

## 2023-01-11 RX ADMIN — BUDESONIDE AND FORMOTEROL FUMARATE DIHYDRATE 2 PUFF(S): 160; 4.5 AEROSOL RESPIRATORY (INHALATION) at 17:06

## 2023-01-11 RX ADMIN — Medication 3 MILLILITER(S): at 00:43

## 2023-01-11 RX ADMIN — POLYETHYLENE GLYCOL 3350 17 GRAM(S): 17 POWDER, FOR SOLUTION ORAL at 13:09

## 2023-01-11 RX ADMIN — Medication 650 MILLIGRAM(S): at 14:08

## 2023-01-11 RX ADMIN — Medication 1 APPLICATION(S): at 17:34

## 2023-01-11 RX ADMIN — Medication 1 APPLICATION(S): at 17:33

## 2023-01-11 RX ADMIN — PREGABALIN 1000 MICROGRAM(S): 225 CAPSULE ORAL at 17:35

## 2023-01-11 RX ADMIN — Medication 3 MILLILITER(S): at 23:41

## 2023-01-11 RX ADMIN — CHLORHEXIDINE GLUCONATE 1 APPLICATION(S): 213 SOLUTION TOPICAL at 06:23

## 2023-01-11 RX ADMIN — Medication 1 APPLICATION(S): at 05:59

## 2023-01-11 RX ADMIN — Medication 81 MILLIGRAM(S): at 13:09

## 2023-01-11 RX ADMIN — ATORVASTATIN CALCIUM 20 MILLIGRAM(S): 80 TABLET, FILM COATED ORAL at 20:50

## 2023-01-11 RX ADMIN — Medication 3 MILLILITER(S): at 05:09

## 2023-01-11 RX ADMIN — ROBINUL 1 MILLIGRAM(S): 0.2 INJECTION INTRAMUSCULAR; INTRAVENOUS at 17:38

## 2023-01-11 RX ADMIN — CHLORHEXIDINE GLUCONATE 15 MILLILITER(S): 213 SOLUTION TOPICAL at 17:33

## 2023-01-11 RX ADMIN — Medication 650 MILLIGRAM(S): at 13:09

## 2023-01-11 RX ADMIN — Medication 1 MILLIGRAM(S): at 13:09

## 2023-01-11 RX ADMIN — APIXABAN 5 MILLIGRAM(S): 2.5 TABLET, FILM COATED ORAL at 17:38

## 2023-01-11 RX ADMIN — INSULIN GLARGINE 20 UNIT(S): 100 INJECTION, SOLUTION SUBCUTANEOUS at 08:33

## 2023-01-11 NOTE — PROGRESS NOTE ADULT - ASSESSMENT
REVIEW OF SYMPTOMS      Able to give (reliable) ROS  NO     PHYSICAL EXAM    HEENT Unremarkable  atraumatic   RESP Fair air entry EXP prolonged    Harsh breath sound Resp distres mild   CARDIAC S1 S2 No S3     NO JVD    ABDOMEN SOFT BS PRESENT NOT DISTENDED No hepatosplenomegaly   PEDAL EDEMA present No calf tenderness  NO rash       GENERAL DATA .   GOC.  12/11/2022 full code       ALLGY.  nka                            WT. ..  12/2/2022 86  BMI. ..    12/2/2022 29                          ICU STAY.  .. 11/29-12/9  COVID.   .. 12/2/2022 scv2 (-)   .. 11/20/2022 scv2 (-)   BEST PRACTICE ISSUES.    HOB ELEVATN. Yes  DVT PPLX.    .. 1/3/2023 apixa 5.2 (vte)  12/12 l Subclav throm  ALEJO PPLX.   ..      INFN PPLX.   .. 11/25 chlorhex .12%   .. 11/14 chlorhex 2%   SP SW ANTWAN.         DIET.    ..  12/15 glucerna 1.2 1440 gt   IV fl.  ..            PROCEDURES.  .. 12/19 debridement of sacral wound   .. 12/5/2022 trach  .. 12/5/2022 peg   .. 12/12 r arm midline       ABGS.  1/6/2023 ac 16/450/.3/5 746/49/123   12/3/2022 ac 14/450/.3/5 750/46/75      VS/ PO/IO/ VENT/ DRIPS.  1/11/2023 afeb 77 100/60   1/11/2023 35% tc 100%     PATIENT PRESENTATION.  72 m doa 11/14/2022 cac    PMH  PMH CAD s/p PCI with stent 2015 and CABG 2014,   PMH  s/p medtronic PPM,   PMH CVA (lacunar infarct)    HOSPITAL COURSE   .. 1) PEA arrest with ROSC after approximately 5 min 11/14  Now communicative   .. 2) DVT 12/12 l Subclav thromS 12/15/2022 lvnx 80.2 1/3 changed to apixaban 5.2  .. 3) TRACH 12/5/2022 trach  .. 4) PEG12/5/2022 peg   .. 5) Cellulitis hand absc 12/13 mod enterococcus fecalis  staph epi 12/12-12/15/2022  cephalexin 12/15 vanco once  12/16-12/19 zosyn  .. 6) Vent weaning     PROBLEM ASSESSMENT RECOMMENDATIONS.  WEANING   .. wean as tolerated  .. 1/4/2022  If able to tolerate cpap 5/5 x 2 h will place on tc   .. 1/5/2023 DW Dr Valdovinos Tried on 35% tc   .. 1/6/2023 DW RT Pt to be on monitor during weaning trials and to be placed on vent at night and trach collar during daytime as tolerated   .. 1/7/2023 above reinforced to RT   .. 1/8/2023 placed on trach collar but has lot of secretns lasted 45 min   1/11/2023 tolerated tc   TRACH LEAK  .. 1/9/2023 Notified by Hospitalist that pt is losing volunmes and that she is calling surgery to see if trach balloon has leak  CHF   .. 11/14/2022 echo mod decr segmental lvsf apical lateral apiccal ant segment are abn takotsubo cmpthy pasp 42 .  .. Monitor for chf has chr hfref per echo   COPD   .. 12/30 symbicort 160   .. 1/7/2023 glycopyrrolate 1.2   .. 1/9/2023 ipratropium  .. continue bd ics for copd   sp cac   .. good neuro recovery awake alert interactive   VTE  .. 1/3/2023 apixa 5.2 (vte)        TIME SPENT   Over 25 minutes aggregate care time spent on encounter; activities included   direct patient care, counseling and/or coordinating care reviewing notes, lab data/ imaging , discussion with multidisciplinary team/ patient  /family and explaining in detail risks, benefits, alternatives  of the recommendations     BRANDON CAMARILLO

## 2023-01-11 NOTE — PROGRESS NOTE ADULT - SUBJECTIVE AND OBJECTIVE BOX
Patient is a 72y old  Male who presents with a chief complaint of s/p cardiac arrest, hypoglycemia (10 Brando 2023 10:35)      INTERVAL HPI/OVERNIGHT EVENTS:  Pt was seen and examined, no acute events.      MEDICATIONS  (STANDING):  albuterol/ipratropium for Nebulization 3 milliLiter(s) Nebulizer every 6 hours  apixaban 5 milliGRAM(s) Oral every 12 hours  aspirin  chewable 81 milliGRAM(s) Oral daily  atorvastatin 20 milliGRAM(s) Oral at bedtime  bacitracin   Ointment 1 Application(s) Topical two times a day  budesonide 160 MICROgram(s)/formoterol 4.5 MICROgram(s) Inhaler 2 Puff(s) Inhalation two times a day  chlorhexidine 0.12% Liquid 15 milliLiter(s) Oral Mucosa every 12 hours  chlorhexidine 2% Cloths 1 Application(s) Topical <User Schedule>  collagenase Ointment 1 Application(s) Topical two times a day  cyanocobalamin 1000 MICROGram(s) Oral daily  diphenhydrAMINE Injectable 50 milliGRAM(s) IV Push once  folic acid 1 milliGRAM(s) Oral daily  glycopyrrolate 1 milliGRAM(s) Oral two times a day  insulin glargine Injectable (LANTUS) 20 Unit(s) SubCutaneous every morning  insulin lispro (ADMELOG) corrective regimen sliding scale   SubCutaneous every 6 hours  levETIRAcetam 1500 milliGRAM(s) Oral two times a day  metoprolol tartrate 25 milliGRAM(s) Oral two times a day  polyethylene glycol 3350 17 Gram(s) Oral daily  senna 2 Tablet(s) Oral at bedtime  silver sulfADIAZINE 1% Cream 1 Application(s) Topical two times a day    MEDICATIONS  (PRN):  acetaminophen     Tablet .. 650 milliGRAM(s) Oral every 6 hours PRN Temp greater or equal to 38C (100.4F), Mild Pain (1 - 3)  albuterol    90 MICROgram(s) HFA Inhaler 2 Puff(s) Inhalation every 6 hours PRN Shortness of Breath and/or Wheezing  aluminum hydroxide/magnesium hydroxide/simethicone Suspension 30 milliLiter(s) Oral every 4 hours PRN Dyspepsia        Allergies  No Known Allergies        Vital Signs Last 24 Hrs  T(C): 36.9 (11 Jan 2023 16:20), Max: 37.4 (11 Jan 2023 00:04)  T(F): 98.4 (11 Jan 2023 16:20), Max: 99.4 (11 Jan 2023 00:04)  HR: 77 (11 Jan 2023 16:20) (60 - 78)  BP: 103/64 (11 Jan 2023 16:20) (93/56 - 115/65)  BP(mean): --  RR: 18 (11 Jan 2023 16:20) (18 - 21)  SpO2: 100% (11 Jan 2023 16:20) (99% - 100%)    Parameters below as of 11 Jan 2023 11:46  Patient On (Oxygen Delivery Method): tracheostomy collar            PHYSICAL EXAM:  GENERAL: Obese, NAD  CHEST/LUNG: Decreased air entry bibasally. no wheezing. s/p trach , on trach collar today  HEART: Regular rate and rhythm; + murmur  ABDOMEN: Soft, Nontender, Nondistended; Bowel sounds present.s/p PEG  EXTREMITIES:  Moving all four extremities spontaneously, No clubbing, cyanosis, or edema. localized left hand dorsal surface non tender likely chronic hematoma mass.   NERVOUS SYSTEM:  Grossly non focal. followed simple commands. communicating with writing   Psychiatry: AAO x 3, mood is appropriate         LABS:                                   9.8    7.13  )-----------( 230      ( 11 Jan 2023 07:40 )             30.4     01-11    144  |  108  |  32<H>  ----------------------------<  125<H>  3.9   |  30  |  0.93    Ca    8.8      11 Jan 2023 07:40    TPro  6.3  /  Alb  2.3<L>  /  TBili  0.3  /  DBili  x   /  AST  19  /  ALT  27  /  AlkPhos  84  01-11              CAPILLARY BLOOD GLUCOSE      POCT Blood Glucose.: 180 mg/dL (10 Brando 2023 10:48)  POCT Blood Glucose.: 164 mg/dL (10 Brando 2023 07:31)  POCT Blood Glucose.: 144 mg/dL (10 Brando 2023 05:30)  POCT Blood Glucose.: 165 mg/dL (09 Jan 2023 23:45)  POCT Blood Glucose.: 83 mg/dL (09 Jan 2023 16:21)      RADIOLOGY & ADDITIONAL TESTS:    Imaging Personally Reviewed:  [ ] YES  [ ] NO    Consultant(s) Notes Reviewed:  [ ] YES  [ ] NO    Care Discussed with Consultants/Other Providers [ ] YES  [ ] NO

## 2023-01-11 NOTE — PROGRESS NOTE ADULT - ASSESSMENT
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 Unknown how long pt unresponsive for at home. Blood sugar in the 40s with EMS s/p glucagon. On arrival to ER pt hypothermic and agonally breathing. Pt became unresponsive with loss of pulse; ACLS initiated. PEA arrest with ROSC after approximately 5 min s/p Epi x2.  Pt intubated during CODE BLUE. Per son pt on the day prior to presentation reported low blood sugar reads on his glucometer in the range of 80s. Son believes pt continued to take his insulin and oral diabetic regimen. Son states that pt was eating but may have been eating less in the last few days. Later on pt noted to have tonic-clonic Sz.     Problem/Plan-1:  Problem: PEA arrest;s/p Epi x 2. ROSC achieved  CAD/ CHF history:  Takotsubo cardiomyopathy found on Echo   stable  cont current meds. entresto at discharge per cards.     Problem/Plan-2:  Problem: Acute hypoxemic respiratory failure now chronic  s/p intubation , failed extubation, now trach to vent  c/w vent management, trach collar in daytime   cont current bronchodilators. discussed with pulmonologist       Problem/Plan-3:  Problem: LUE VTE with enterococcal faecalis cellulitis /infected left hand hematoma s/p bedside debridement 12/13   LUE edema and wound of left hand ; good radial pulse   --LUE venous duplex : occlusive thrombus in the left mid subclavian vein with partial   slow flow detected in the lateral subclavian vein.  --LUE arterial Duplex neg   --change lovenox to eliquis 5 mg bid.   --12/13 Hand surgery consult appreciated, s/p bedside debridement.   --antibiotic course completed       Problem/Plan-5:  Problem: New onset Seizure likely due to anoxic brain injury  cont keppra as per colleague discussion with Dr. Loo.       Problem/Plan-6:  Problem: ELMER 2 to ATN  resolved    Problem/Plan-7:  Problem: Shock liver.    due to PEA arrest. resolved    Problem/Plan-8:  Problem:DM2   A1c 8.8%  continue with current management   FS goal 140-180   continue with PEG feeds     Problem/Plan-9:  Problem: Hypophosphatemia --repleted     Problem/Plan-10:  Problem: Ustageable sacral pressure wound   s/p debridement 12/19    Problem/Plan-11:  Problem: Normocytic anemia   no e/o bleeding or bruising   H/H stable   low normal Vit B12 and folic acid --supplement       Moderate PCM and dysphagia: PEG tube feeding.     Stage 3 sacral ulcer: Clean, continue wound care    Preventative measures   dvt ppx>>>on DOAC  fall, aspiration  precautions  Dipso: Patient has no insurance and Family in process of getting guardianship. Patient will then need placement.

## 2023-01-11 NOTE — PROGRESS NOTE ADULT - SUBJECTIVE AND OBJECTIVE BOX
BRANDON CAMARILLO    [address] [address]    Allergies    No Known Allergies    Intolerances        PAST MEDICAL & SURGICAL HISTORY:  HTN (hypertension)      HLD (hyperlipidemia)      Diabetes mellitus      Lacunar infarction      BPH (benign prostatic hyperplasia)      CHF (congestive heart failure)      Chronic obstructive pulmonary disease, unspecified COPD type      S/P CABG (coronary artery bypass graft)  2014          FAMILY HISTORY:      Home Medications:  aspirin 81 mg oral delayed release tablet: 1 tab(s) orally once a day (12 Jun 2020 17:35)  Liquifilm Tears preserved ophthalmic solution: 1 drop(s) to each affected eye 2 times a day (12 Jun 2020 17:35)      MEDICATIONS  (STANDING):  albuterol/ipratropium for Nebulization 3 milliLiter(s) Nebulizer every 6 hours  apixaban 5 milliGRAM(s) Oral every 12 hours  aspirin  chewable 81 milliGRAM(s) Oral daily  atorvastatin 20 milliGRAM(s) Oral at bedtime  bacitracin   Ointment 1 Application(s) Topical two times a day  budesonide 160 MICROgram(s)/formoterol 4.5 MICROgram(s) Inhaler 2 Puff(s) Inhalation two times a day  chlorhexidine 0.12% Liquid 15 milliLiter(s) Oral Mucosa every 12 hours  chlorhexidine 2% Cloths 1 Application(s) Topical <User Schedule>  collagenase Ointment 1 Application(s) Topical two times a day  cyanocobalamin 1000 MICROGram(s) Oral daily  diphenhydrAMINE Injectable 50 milliGRAM(s) IV Push once  folic acid 1 milliGRAM(s) Oral daily  glycopyrrolate 1 milliGRAM(s) Oral two times a day  insulin glargine Injectable (LANTUS) 20 Unit(s) SubCutaneous every morning  insulin lispro (ADMELOG) corrective regimen sliding scale   SubCutaneous every 6 hours  levETIRAcetam 1500 milliGRAM(s) Oral two times a day  metoprolol tartrate 25 milliGRAM(s) Oral two times a day  polyethylene glycol 3350 17 Gram(s) Oral daily  senna 2 Tablet(s) Oral at bedtime  silver sulfADIAZINE 1% Cream 1 Application(s) Topical two times a day    MEDICATIONS  (PRN):  acetaminophen     Tablet .. 650 milliGRAM(s) Oral every 6 hours PRN Temp greater or equal to 38C (100.4F), Mild Pain (1 - 3)  albuterol    90 MICROgram(s) HFA Inhaler 2 Puff(s) Inhalation every 6 hours PRN Shortness of Breath and/or Wheezing  aluminum hydroxide/magnesium hydroxide/simethicone Suspension 30 milliLiter(s) Oral every 4 hours PRN Dyspepsia      Diet, NPO with Tube Feed:   Tube Feeding Modality: Gastrostomy  Glucerna 1.2 Pedrito  Total Volume for 24 Hours (mL): 1440  Continuous  Until Goal Tube Feed Rate (mL per Hour): 60  Tube Feed Duration (in Hours): 24  Tube Feed Start Time: 14:30  Free Water Flush  Free Water Flush Instructions:  30 ml/hr via pump  Liquid Protein Supplement     Qty per Day:  1 (01-09-23 @ 13:45) [Active]          Vital Signs Last 24 Hrs  T(C): 37.3 (11 Jan 2023 04:28), Max: 37.4 (11 Jan 2023 00:04)  T(F): 99.2 (11 Jan 2023 04:28), Max: 99.4 (11 Jan 2023 00:04)  HR: 60 (11 Jan 2023 05:58) (60 - 78)  BP: 115/65 (11 Jan 2023 04:28) (93/56 - 115/65)  BP(mean): --  RR: 21 (11 Jan 2023 04:28) (14 - 21)  SpO2: 100% (11 Jan 2023 05:58) (100% - 100%)    Parameters below as of 11 Jan 2023 04:28  Patient On (Oxygen Delivery Method): ventilator          01-10-23 @ 07:01  -  01-11-23 @ 07:00  --------------------------------------------------------  IN: 1180 mL / OUT: 200 mL / NET: 980 mL        Mode: AC/ CMV (Assist Control/ Continuous Mandatory Ventilation), RR (machine): 14, TV (machine): 450, FiO2: 35, PEEP: 5, ITime: 0.8, MAP: 9, PIP: 21      LABS:                ABG - ( 10 Brando 2023 05:05 )  pH, Arterial: 7.58  pH, Blood: x     /  pCO2: 32    /  pO2: 154   / HCO3: 30    / Base Excess: 8.2   /  SaO2: 99.3                WBC:  WBC Count: 7.38 K/uL (01-09 @ 07:10)      MICROBIOLOGY:  RECENT CULTURES:                  Sodium:  Sodium, Serum: 139 mmol/L (01-09 @ 07:10)      0.99 mg/dL 01-09 @ 07:10      Hemoglobin:  Hemoglobin: 9.2 g/dL (01-09 @ 07:10)      Platelets: Platelet Count - Automated: 220 K/uL (01-09 @ 07:10)              RADIOLOGY & ADDITIONAL STUDIES:      MICROBIOLOGY:  RECENT CULTURES:

## 2023-01-12 LAB
GLUCOSE BLDC GLUCOMTR-MCNC: 133 MG/DL — HIGH (ref 70–99)
GLUCOSE BLDC GLUCOMTR-MCNC: 163 MG/DL — HIGH (ref 70–99)
GLUCOSE BLDC GLUCOMTR-MCNC: 165 MG/DL — HIGH (ref 70–99)
GLUCOSE BLDC GLUCOMTR-MCNC: 168 MG/DL — HIGH (ref 70–99)
GLUCOSE BLDC GLUCOMTR-MCNC: 171 MG/DL — HIGH (ref 70–99)
GLUCOSE BLDC GLUCOMTR-MCNC: 173 MG/DL — HIGH (ref 70–99)
GLUCOSE BLDC GLUCOMTR-MCNC: 179 MG/DL — HIGH (ref 70–99)
HCT VFR BLD CALC: 31.3 % — LOW (ref 39–50)
HGB BLD-MCNC: 9.8 G/DL — LOW (ref 13–17)
MCHC RBC-ENTMCNC: 30.5 PG — SIGNIFICANT CHANGE UP (ref 27–34)
MCHC RBC-ENTMCNC: 31.3 G/DL — LOW (ref 32–36)
MCV RBC AUTO: 97.5 FL — SIGNIFICANT CHANGE UP (ref 80–100)
NRBC # BLD: 0 /100 WBCS — SIGNIFICANT CHANGE UP (ref 0–0)
PLATELET # BLD AUTO: 226 K/UL — SIGNIFICANT CHANGE UP (ref 150–400)
RBC # BLD: 3.21 M/UL — LOW (ref 4.2–5.8)
RBC # FLD: 14.6 % — HIGH (ref 10.3–14.5)
WBC # BLD: 7.08 K/UL — SIGNIFICANT CHANGE UP (ref 3.8–10.5)
WBC # FLD AUTO: 7.08 K/UL — SIGNIFICANT CHANGE UP (ref 3.8–10.5)

## 2023-01-12 PROCEDURE — 99232 SBSQ HOSP IP/OBS MODERATE 35: CPT

## 2023-01-12 RX ADMIN — APIXABAN 5 MILLIGRAM(S): 2.5 TABLET, FILM COATED ORAL at 17:56

## 2023-01-12 RX ADMIN — ROBINUL 1 MILLIGRAM(S): 0.2 INJECTION INTRAMUSCULAR; INTRAVENOUS at 17:56

## 2023-01-12 RX ADMIN — Medication 1 APPLICATION(S): at 17:58

## 2023-01-12 RX ADMIN — Medication 2: at 23:47

## 2023-01-12 RX ADMIN — PREGABALIN 1000 MICROGRAM(S): 225 CAPSULE ORAL at 11:27

## 2023-01-12 RX ADMIN — Medication 3 MILLILITER(S): at 11:02

## 2023-01-12 RX ADMIN — Medication 3 MILLILITER(S): at 18:17

## 2023-01-12 RX ADMIN — Medication 1 APPLICATION(S): at 17:59

## 2023-01-12 RX ADMIN — POLYETHYLENE GLYCOL 3350 17 GRAM(S): 17 POWDER, FOR SOLUTION ORAL at 11:27

## 2023-01-12 RX ADMIN — SENNA PLUS 2 TABLET(S): 8.6 TABLET ORAL at 21:17

## 2023-01-12 RX ADMIN — Medication 1 APPLICATION(S): at 05:55

## 2023-01-12 RX ADMIN — Medication 3 MILLILITER(S): at 23:57

## 2023-01-12 RX ADMIN — Medication 2: at 11:27

## 2023-01-12 RX ADMIN — Medication 1 APPLICATION(S): at 05:56

## 2023-01-12 RX ADMIN — Medication 81 MILLIGRAM(S): at 11:26

## 2023-01-12 RX ADMIN — CHLORHEXIDINE GLUCONATE 15 MILLILITER(S): 213 SOLUTION TOPICAL at 05:57

## 2023-01-12 RX ADMIN — Medication 650 MILLIGRAM(S): at 08:09

## 2023-01-12 RX ADMIN — LEVETIRACETAM 1500 MILLIGRAM(S): 250 TABLET, FILM COATED ORAL at 17:56

## 2023-01-12 RX ADMIN — CHLORHEXIDINE GLUCONATE 1 APPLICATION(S): 213 SOLUTION TOPICAL at 05:54

## 2023-01-12 RX ADMIN — CHLORHEXIDINE GLUCONATE 15 MILLILITER(S): 213 SOLUTION TOPICAL at 18:45

## 2023-01-12 RX ADMIN — Medication 25 MILLIGRAM(S): at 05:52

## 2023-01-12 RX ADMIN — ROBINUL 1 MILLIGRAM(S): 0.2 INJECTION INTRAMUSCULAR; INTRAVENOUS at 05:53

## 2023-01-12 RX ADMIN — Medication 2: at 06:22

## 2023-01-12 RX ADMIN — BUDESONIDE AND FORMOTEROL FUMARATE DIHYDRATE 2 PUFF(S): 160; 4.5 AEROSOL RESPIRATORY (INHALATION) at 18:17

## 2023-01-12 RX ADMIN — APIXABAN 5 MILLIGRAM(S): 2.5 TABLET, FILM COATED ORAL at 05:52

## 2023-01-12 RX ADMIN — Medication 25 MILLIGRAM(S): at 17:57

## 2023-01-12 RX ADMIN — Medication 1 MILLIGRAM(S): at 11:27

## 2023-01-12 RX ADMIN — LEVETIRACETAM 1500 MILLIGRAM(S): 250 TABLET, FILM COATED ORAL at 05:53

## 2023-01-12 RX ADMIN — ATORVASTATIN CALCIUM 20 MILLIGRAM(S): 80 TABLET, FILM COATED ORAL at 21:17

## 2023-01-12 RX ADMIN — BUDESONIDE AND FORMOTEROL FUMARATE DIHYDRATE 2 PUFF(S): 160; 4.5 AEROSOL RESPIRATORY (INHALATION) at 05:19

## 2023-01-12 RX ADMIN — INSULIN GLARGINE 20 UNIT(S): 100 INJECTION, SOLUTION SUBCUTANEOUS at 09:27

## 2023-01-12 RX ADMIN — Medication 1 APPLICATION(S): at 18:45

## 2023-01-12 RX ADMIN — Medication 3 MILLILITER(S): at 05:19

## 2023-01-12 NOTE — PROGRESS NOTE ADULT - ASSESSMENT
REVIEW OF SYMPTOMS      Able to give (reliable) ROS  NO     PHYSICAL EXAM    HEENT Unremarkable  atraumatic   RESP Fair air entry EXP prolonged    Harsh breath sound Resp distres mild   CARDIAC S1 S2 No S3     NO JVD    ABDOMEN SOFT BS PRESENT NOT DISTENDED No hepatosplenomegaly   PEDAL EDEMA present No calf tenderness  NO rash       GENERAL DATA .   GOC.  12/11/2022 full code       ALLGY.  nka                            WT. ..  12/2/2022 86  BMI. ..    12/2/2022 29                          ICU STAY.  .. 11/29-12/9  COVID.   .. 12/2/2022 scv2 (-)   .. 11/20/2022 scv2 (-)   BEST PRACTICE ISSUES.    HOB ELEVATN. Yes  DVT PPLX.    .. 1/3/2023 apixa 5.2 (vte)  12/12 l Subclav throm  ALEJO PPLX.   ..      INFN PPLX.   .. 11/25 chlorhex .12%   .. 11/14 chlorhex 2%   SP SW ANTWAN.         DIET.    ..  12/15 glucerna 1.2 1440 gt   IV fl.  ..            PROCEDURES.  .. 12/19 debridement of sacral wound   .. 12/5/2022 trach  .. 12/5/2022 peg   .. 12/12 r arm midline       ABGS.  1/6/2023 ac 16/450/.3/5 746/49/123   12/3/2022 ac 14/450/.3/5 750/46/75      VS/ PO/IO/ VENT/ DRIPS.  1/12/2023 afeb 86 97/58   1/11/2023 afeb 77 100/60     PATIENT PRESENTATION.  72 m doa 11/14/2022 cac    PMH  PMH CAD s/p PCI with stent 2015 and CABG 2014,   PMH  s/p medtronic PPM,   PMH CVA (lacunar infarct)    HOSPITAL COURSE   .. 1) PEA arrest with ROSC after approximately 5 min 11/14  Now communicative   .. 2) DVT 12/12 l Subclav thromS 12/15/2022 lvnx 80.2 1/3 changed to apixaban 5.2  .. 3) TRACH 12/5/2022 trach  .. 4) PEG12/5/2022 peg   .. 5) Cellulitis hand absc 12/13 mod enterococcus fecalis  staph epi 12/12-12/15/2022  cephalexin 12/15 vanco once  12/16-12/19 zosyn  .. 6) Vent weaning     PROBLEM ASSESSMENT RECOMMENDATIONS.  WEANING   .. wean as tolerated  .. 1/4/2022  If able to tolerate cpap 5/5 x 2 h will place on tc   .. 1/5/2023 DW Dr Valdovinos Tried on 35% tc   .. 1/6/2023 DW RT Pt to be on monitor during weaning trials and to be placed on vent at night and trach collar during daytime as tolerated   .. 1/7/2023 above reinforced to RT   .. 1/8/2023 placed on trach collar but has lot of secretns lasted 45 min   1/11/2023 tolerated tc several h  1/12/2023   TRACH LEAK  .. 1/9/2023 Notified by Hospitalist that pt is losing volunmes and that she is calling surgery to see if trach balloon has leak  CHF   .. 11/14/2022 echo mod decr segmental lvsf apical lateral apiccal ant segment are abn takotsubo cmpthy pasp 42 .  .. Monitor for chf has chr hfref per echo   COPD   .. 12/30 symbicort 160   .. 1/7/2023 glycopyrrolate 1.2   .. 1/9/2023 ipratropium  .. continue bd ics for copd   Anemia.  .. Hb 1/12/2023 Hb 9.8  .. target hb 7 (+)  .. monitor   sp cac   .. good neuro recovery awake alert interactive   VTE  .. 1/3/2023 apixa 5.2 (vte)        TIME SPENT   Over 25 minutes aggregate care time spent on encounter; activities included   direct patient care, counseling and/or coordinating care reviewing notes, lab data/ imaging , discussion with multidisciplinary team/ patient  /family and explaining in detail risks, benefits, alternatives  of the recommendations     BRANDON CAMARILLO

## 2023-01-12 NOTE — PROGRESS NOTE ADULT - SUBJECTIVE AND OBJECTIVE BOX
BRANDON CAMARILLO    [address] [address]    Allergies    No Known Allergies    Intolerances        PAST MEDICAL & SURGICAL HISTORY:  HTN (hypertension)      HLD (hyperlipidemia)      Diabetes mellitus      Lacunar infarction      BPH (benign prostatic hyperplasia)      CHF (congestive heart failure)      Chronic obstructive pulmonary disease, unspecified COPD type      S/P CABG (coronary artery bypass graft)  2014          FAMILY HISTORY:      Home Medications:  aspirin 81 mg oral delayed release tablet: 1 tab(s) orally once a day (12 Jun 2020 17:35)  Liquifilm Tears preserved ophthalmic solution: 1 drop(s) to each affected eye 2 times a day (12 Jun 2020 17:35)      MEDICATIONS  (STANDING):  albuterol/ipratropium for Nebulization 3 milliLiter(s) Nebulizer every 6 hours  apixaban 5 milliGRAM(s) Oral every 12 hours  aspirin  chewable 81 milliGRAM(s) Oral daily  atorvastatin 20 milliGRAM(s) Oral at bedtime  bacitracin   Ointment 1 Application(s) Topical two times a day  budesonide 160 MICROgram(s)/formoterol 4.5 MICROgram(s) Inhaler 2 Puff(s) Inhalation two times a day  chlorhexidine 0.12% Liquid 15 milliLiter(s) Oral Mucosa every 12 hours  chlorhexidine 2% Cloths 1 Application(s) Topical <User Schedule>  collagenase Ointment 1 Application(s) Topical two times a day  cyanocobalamin 1000 MICROGram(s) Oral daily  diphenhydrAMINE Injectable 50 milliGRAM(s) IV Push once  folic acid 1 milliGRAM(s) Oral daily  glycopyrrolate 1 milliGRAM(s) Oral two times a day  insulin glargine Injectable (LANTUS) 20 Unit(s) SubCutaneous every morning  insulin lispro (ADMELOG) corrective regimen sliding scale   SubCutaneous every 6 hours  levETIRAcetam 1500 milliGRAM(s) Oral two times a day  metoprolol tartrate 25 milliGRAM(s) Oral two times a day  polyethylene glycol 3350 17 Gram(s) Oral daily  senna 2 Tablet(s) Oral at bedtime  silver sulfADIAZINE 1% Cream 1 Application(s) Topical two times a day    MEDICATIONS  (PRN):  acetaminophen     Tablet .. 650 milliGRAM(s) Oral every 6 hours PRN Temp greater or equal to 38C (100.4F), Mild Pain (1 - 3)  albuterol    90 MICROgram(s) HFA Inhaler 2 Puff(s) Inhalation every 6 hours PRN Shortness of Breath and/or Wheezing  aluminum hydroxide/magnesium hydroxide/simethicone Suspension 30 milliLiter(s) Oral every 4 hours PRN Dyspepsia      Diet, NPO with Tube Feed:   Tube Feeding Modality: Gastrostomy  Glucerna 1.2 Pedrito  Total Volume for 24 Hours (mL): 1440  Continuous  Until Goal Tube Feed Rate (mL per Hour): 60  Tube Feed Duration (in Hours): 24  Tube Feed Start Time: 14:30  Free Water Flush  Free Water Flush Instructions:  30 ml/hr via pump  Liquid Protein Supplement     Qty per Day:  1 (01-09-23 @ 13:45) [Active]          Vital Signs Last 24 Hrs  T(C): 36.7 (12 Jan 2023 05:01), Max: 36.9 (11 Jan 2023 16:20)  T(F): 98 (12 Jan 2023 05:01), Max: 98.4 (11 Jan 2023 16:20)  HR: 72 (12 Jan 2023 06:38) (60 - 77)  BP: 116/72 (12 Jan 2023 05:01) (103/64 - 116/72)  BP(mean): --  RR: 18 (12 Jan 2023 05:01) (18 - 18)  SpO2: 100% (12 Jan 2023 06:38) (99% - 100%)    Parameters below as of 12 Jan 2023 06:31  Patient On (Oxygen Delivery Method): ventilator          01-11-23 @ 07:01  -  01-12-23 @ 07:00  --------------------------------------------------------  IN: 671 mL / OUT: 1210 mL / NET: -539 mL        Mode: CPAP with PS, FiO2: 35, PEEP: 5, PS: 5, MAP: 8      LABS:                        9.8    7.13  )-----------( 230      ( 11 Jan 2023 07:40 )             30.4     01-11    144  |  108  |  32<H>  ----------------------------<  125<H>  3.9   |  30  |  0.93    Ca    8.8      11 Jan 2023 07:40    TPro  6.3  /  Alb  2.3<L>  /  TBili  0.3  /  DBili  x   /  AST  19  /  ALT  27  /  AlkPhos  84  01-11              WBC:  WBC Count: 7.13 K/uL (01-11 @ 07:40)  WBC Count: 7.38 K/uL (01-09 @ 07:10)      MICROBIOLOGY:  RECENT CULTURES:                  Sodium:  Sodium, Serum: 144 mmol/L (01-11 @ 07:40)  Sodium, Serum: 139 mmol/L (01-09 @ 07:10)      0.93 mg/dL 01-11 @ 07:40  0.99 mg/dL 01-09 @ 07:10      Hemoglobin:  Hemoglobin: 9.8 g/dL (01-11 @ 07:40)  Hemoglobin: 9.2 g/dL (01-09 @ 07:10)      Platelets: Platelet Count - Automated: 230 K/uL (01-11 @ 07:40)  Platelet Count - Automated: 220 K/uL (01-09 @ 07:10)      LIVER FUNCTIONS - ( 11 Jan 2023 07:40 )  Alb: 2.3 g/dL / Pro: 6.3 gm/dL / ALK PHOS: 84 U/L / ALT: 27 U/L / AST: 19 U/L / GGT: x                 RADIOLOGY & ADDITIONAL STUDIES:      MICROBIOLOGY:  RECENT CULTURES:

## 2023-01-12 NOTE — PROGRESS NOTE ADULT - SUBJECTIVE AND OBJECTIVE BOX
Patient is a 72y old  Male who presents with a chief complaint of s/p cardiac arrest, hypoglycemia (10 Brando 2023 10:35)      INTERVAL HPI/OVERNIGHT EVENTS:  Pt was seen and examined, no acute events.      MEDICATIONS  (STANDING):  albuterol/ipratropium for Nebulization 3 milliLiter(s) Nebulizer every 6 hours  apixaban 5 milliGRAM(s) Oral every 12 hours  aspirin  chewable 81 milliGRAM(s) Oral daily  atorvastatin 20 milliGRAM(s) Oral at bedtime  bacitracin   Ointment 1 Application(s) Topical two times a day  budesonide 160 MICROgram(s)/formoterol 4.5 MICROgram(s) Inhaler 2 Puff(s) Inhalation two times a day  chlorhexidine 0.12% Liquid 15 milliLiter(s) Oral Mucosa every 12 hours  chlorhexidine 2% Cloths 1 Application(s) Topical <User Schedule>  collagenase Ointment 1 Application(s) Topical two times a day  cyanocobalamin 1000 MICROGram(s) Oral daily  diphenhydrAMINE Injectable 50 milliGRAM(s) IV Push once  folic acid 1 milliGRAM(s) Oral daily  glycopyrrolate 1 milliGRAM(s) Oral two times a day  insulin glargine Injectable (LANTUS) 20 Unit(s) SubCutaneous every morning  insulin lispro (ADMELOG) corrective regimen sliding scale   SubCutaneous every 6 hours  levETIRAcetam 1500 milliGRAM(s) Oral two times a day  metoprolol tartrate 25 milliGRAM(s) Oral two times a day  polyethylene glycol 3350 17 Gram(s) Oral daily  senna 2 Tablet(s) Oral at bedtime  silver sulfADIAZINE 1% Cream 1 Application(s) Topical two times a day    MEDICATIONS  (PRN):  acetaminophen     Tablet .. 650 milliGRAM(s) Oral every 6 hours PRN Temp greater or equal to 38C (100.4F), Mild Pain (1 - 3)  albuterol    90 MICROgram(s) HFA Inhaler 2 Puff(s) Inhalation every 6 hours PRN Shortness of Breath and/or Wheezing  aluminum hydroxide/magnesium hydroxide/simethicone Suspension 30 milliLiter(s) Oral every 4 hours PRN Dyspepsia        Allergies  No Known Allergies        Vital Signs Last 24 Hrs  T(C): 36.5 (12 Jan 2023 10:31), Max: 36.9 (11 Jan 2023 16:20)  T(F): 97.7 (12 Jan 2023 10:31), Max: 98.4 (11 Jan 2023 16:20)  HR: 86 (12 Jan 2023 10:31) (60 - 89)  BP: 97/58 (12 Jan 2023 10:31) (97/58 - 116/72)  BP(mean): --  RR: 20 (12 Jan 2023 10:31) (18 - 20)  SpO2: 97% (12 Jan 2023 10:31) (97% - 100%)    Parameters below as of 12 Jan 2023 06:31  Patient On (Oxygen Delivery Method): ventilator          PHYSICAL EXAM:  GENERAL: Obese, NAD  CHEST/LUNG: Decreased air entry bibasally. no wheezing. s/p trach , on trach collar today  HEART: Regular rate and rhythm; + murmur  ABDOMEN: Soft, Nontender, Nondistended; Bowel sounds present.s/p PEG  EXTREMITIES:  Moving all four extremities spontaneously, No clubbing, cyanosis, or edema. localized left hand dorsal surface non tender likely chronic hematoma mass.   NERVOUS SYSTEM:  Grossly non focal. followed simple commands. communicating with writing   Psychiatry: AAO x 3, mood is appropriate         LABS:                                              9.8    7.13  )-----------( 230      ( 11 Jan 2023 07:40 )             30.4     01-11    144  |  108  |  32<H>  ----------------------------<  125<H>  3.9   |  30  |  0.93    Ca    8.8      11 Jan 2023 07:40    TPro  6.3  /  Alb  2.3<L>  /  TBili  0.3  /  DBili  x   /  AST  19  /  ALT  27  /  AlkPhos  84  01-11                  CAPILLARY BLOOD GLUCOSE      POCT Blood Glucose.: 180 mg/dL (10 Brando 2023 10:48)  POCT Blood Glucose.: 164 mg/dL (10 Brando 2023 07:31)  POCT Blood Glucose.: 144 mg/dL (10 Brando 2023 05:30)  POCT Blood Glucose.: 165 mg/dL (09 Jan 2023 23:45)  POCT Blood Glucose.: 83 mg/dL (09 Jan 2023 16:21)      RADIOLOGY & ADDITIONAL TESTS:    Imaging Personally Reviewed:  [ ] YES  [ ] NO    Consultant(s) Notes Reviewed:  [ ] YES  [ ] NO    Care Discussed with Consultants/Other Providers [ ] YES  [ ] NO

## 2023-01-12 NOTE — PROGRESS NOTE ADULT - ASSESSMENT
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 Unknown how long pt unresponsive for at home. Blood sugar in the 40s with EMS s/p glucagon. On arrival to ER pt hypothermic and agonally breathing. Pt became unresponsive with loss of pulse; ACLS initiated. PEA arrest with ROSC after approximately 5 min s/p Epi x2.  Pt intubated during CODE BLUE. Per son pt on the day prior to presentation reported low blood sugar reads on his glucometer in the range of 80s. Son believes pt continued to take his insulin and oral diabetic regimen. Son states that pt was eating but may have been eating less in the last few days. Later on pt noted to have tonic-clonic Sz.     Problem/Plan-1:  Problem: PEA arrest;s/p Epi x 2. ROSC achieved  CAD/ CHF history:  Takotsubo cardiomyopathy found on Echo   stable  cont current meds. entresto at discharge per cards.     Problem/Plan-2:  Problem: Acute hypoxemic respiratory failure now chronic  s/p intubation , failed extubation, now trach to vent.   c/w vent management, trach collar in daytime   trach starting to bleed mildly today. discussed with surgery PA and will see   cont current bronchodilators. discussed with pulmonologist       Problem/Plan-3:  Problem: LUE VTE with enterococcal faecalis cellulitis /infected left hand hematoma s/p bedside debridement 12/13   LUE edema and wound of left hand ; good radial pulse   --LUE venous duplex : occlusive thrombus in the left mid subclavian vein with partial   slow flow detected in the lateral subclavian vein.  --LUE arterial Duplex neg   --change lovenox to eliquis 5 mg bid.   --12/13 Hand surgery consult appreciated, s/p bedside debridement.   --antibiotic course completed       Problem/Plan-5:  Problem: New onset Seizure likely due to anoxic brain injury  cont keppra as per colleague discussion with Dr. Loo.       Problem/Plan-6:  Problem: ELMER 2 to ATN  resolved    Problem/Plan-7:  Problem: Shock liver.    due to PEA arrest. resolved    Problem/Plan-8:  Problem:DM2   A1c 8.8%  continue with current management   FS goal 140-180   continue with PEG feeds     Problem/Plan-9:  Problem: Hypophosphatemia --repleted     Problem/Plan-10:  Problem: Ustageable sacral pressure wound   s/p debridement 12/19    Problem/Plan-11:  Problem: Normocytic anemia   no e/o bleeding or bruising   H/H stable   low normal Vit B12 and folic acid --supplement       Moderate PCM and dysphagia: PEG tube feeding.     Stage 3 sacral ulcer: Clean, continue wound care    Preventative measures   dvt ppx>>>on DOAC  fall, aspiration  precautions  Dipso: Patient has no insurance and Family in process of getting guardianship. Patient will then need placement.

## 2023-01-12 NOTE — CONSULT NOTE ADULT - REASON FOR ADMISSION
s/p cardiac arrest, hypoglycemia

## 2023-01-12 NOTE — CONSULT NOTE ADULT - CONSULT REQUESTED DATE/TIME
11-Dec-2022 17:55
13-Dec-2022 16:26
13-Dec-2022 17:32
17-Nov-2022
24-Nov-2022 00:01
11-Dec-2022
12-Jan-2023 16:56

## 2023-01-12 NOTE — CONSULT NOTE ADULT - SUBJECTIVE AND OBJECTIVE BOX
Mr. Hamilton is a 72 year old male with a PMH of lacunar infarct, CHF (EF ~40% per son), CAD s/p PCI with stent and CABGx3 (~2020), s/p PPM (medtronic), HTN, COPD, DMII on insulin and PO meds, BPH, and CKD (stage II?) presenting to ICU s/p cardiac arrest in ED. Per patient son he has recently been doing well and in his normal state of health, however, yesterday Mr. Hamilton started c/o low blood sugars on glucometer. Pt reportedly continued to take his home medications, including both oral antidiabetic agents and insulin (both long and short acting). Today his son found him minimally responsive hanging off his bed, checked his glucose and it read "low," so he called 911. In ED pt was noted to be hypothermic with glucose noted to be 70 just prior to PEA arrest. Pt was coded for ~5 minutes with 2 epi given with ROSC. Pt intubated at that time. is moving all extremities equally and with reactive, equal pupils. He remained hypothermic and is not requiring vasopressor support. Pending CT Scans. ROS otherwise negative per son besides hypoglycemia, with no known sick contacts. Pt accepted to ICU for further management / care.  (14 Nov 2022 01:33)    Consulted by Dr. Arzate regarding bleeding from tracheostomy.  Patient unable to engage in conversation.  Nurse reports suctioning of blood from trach this AM.  Per nurse, respiratory therapy then irrigated and continued suctioning.   No report of hypoxemia, desaturation, hemodynamically instability.     ROS: unable to be performed     PMH/PSH:PAST MEDICAL & SURGICAL HISTORY:  HTN (hypertension)  HLD (hyperlipidemia)  Diabetes mellitus  Lacunar infarction  BPH (benign prostatic hyperplasia)  CHF (congestive heart failure)  Chronic obstructive pulmonary disease, unspecified COPD type  S/P CABG (coronary artery bypass graft)  2014    Allergies:  No Known Allergies      Medications:  acetaminophen     Tablet .. 650 milliGRAM(s) Oral every 6 hours PRN  albuterol    90 MICROgram(s) HFA Inhaler 2 Puff(s) Inhalation every 6 hours PRN  albuterol/ipratropium for Nebulization 3 milliLiter(s) Nebulizer every 6 hours  aluminum hydroxide/magnesium hydroxide/simethicone Suspension 30 milliLiter(s) Oral every 4 hours PRN  apixaban 5 milliGRAM(s) Oral every 12 hours  aspirin  chewable 81 milliGRAM(s) Oral daily  atorvastatin 20 milliGRAM(s) Oral at bedtime  bacitracin   Ointment 1 Application(s) Topical two times a day  budesonide 160 MICROgram(s)/formoterol 4.5 MICROgram(s) Inhaler 2 Puff(s) Inhalation two times a day  chlorhexidine 0.12% Liquid 15 milliLiter(s) Oral Mucosa every 12 hours  chlorhexidine 2% Cloths 1 Application(s) Topical <User Schedule>  collagenase Ointment 1 Application(s) Topical two times a day  cyanocobalamin 1000 MICROGram(s) Oral daily  diphenhydrAMINE Injectable 50 milliGRAM(s) IV Push once  folic acid 1 milliGRAM(s) Oral daily  glycopyrrolate 1 milliGRAM(s) Oral two times a day  insulin glargine Injectable (LANTUS) 20 Unit(s) SubCutaneous every morning  insulin lispro (ADMELOG) corrective regimen sliding scale   SubCutaneous every 6 hours  levETIRAcetam 1500 milliGRAM(s) Oral two times a day  metoprolol tartrate 25 milliGRAM(s) Oral two times a day  polyethylene glycol 3350 17 Gram(s) Oral daily  senna 2 Tablet(s) Oral at bedtime  silver sulfADIAZINE 1% Cream 1 Application(s) Topical two times a day      Vital Signs:  Vital Signs Last 24 Hrs  T(C): 36.5 (12 Jan 2023 10:31), Max: 36.7 (12 Jan 2023 05:01)  T(F): 97.7 (12 Jan 2023 10:31), Max: 98 (12 Jan 2023 05:01)  HR: 86 (12 Jan 2023 10:31) (60 - 89)  BP: 97/58 (12 Jan 2023 10:31) (97/58 - 116/72)  BP(mean): --  RR: 20 (12 Jan 2023 10:31) (18 - 20)  SpO2: 97% (12 Jan 2023 10:31) (97% - 100%)    Parameters below as of 12 Jan 2023 06:31  Patient On (Oxygen Delivery Method): ventilator    Physical Exam:  Constitutional: lying comfortably in bed, no acute distress  Neck: tracheostomy in place, no visible external bleeding from site. Visible blood tinged mucus at trach orifice. Suction cannister with <100 cc blood tinged fluid.   Respiratory: trach collar in place, no retractions, no respiratory distress  Gastrointestinal: soft, NT/ND; no rebound or guarding;   Extremities: no clubbing or cyanosis, cap refill <2 seconds in all extremities   Skin: warm, dry and intact    Laboratory:                          9.8    7.13  )-----------( 230      ( 11 Jan 2023 07:40 )             30.4     01-11    144  |  108  |  32<H>  ----------------------------<  125<H>  3.9   |  30  |  0.93    Ca    8.8      11 Jan 2023 07:40    TPro  6.3  /  Alb  2.3<L>  /  TBili  0.3  /  DBili  x   /  AST  19  /  ALT  27  /  AlkPhos  84  01-11    LIVER FUNCTIONS - ( 11 Jan 2023 07:40 )  Alb: 2.3 g/dL / Pro: 6.3 gm/dL / ALK PHOS: 84 U/L / ALT: 27 U/L / AST: 19 U/L / GGT: x             Intake and Output    01-11-23 @ 07:01  -  01-12-23 @ 07:00  --------------------------------------------------------  IN: 671 mL / OUT: 1210 mL / NET: -539 mL    Imaging:

## 2023-01-12 NOTE — CONSULT NOTE ADULT - ASSESSMENT
73 yo M with h/o COPD, CAD s/p CABG 2014 and PCI 2015, chronic systolic/diastolic heart failure (most recent EF 40-45% - Takotsubos  CM), 2nd degree AV block s/p Medtronic PPM, HTN, DM, CKD 3, and CVA (lacunar infarct); BIBEMS after son returned home from work to find pt unresponsive with noted blood in mouth and sonorous breathing. Unknown how long pt unresponsive for at home. Blood sugar in the 40s with EMS s/p glucagon. On arrival to ER pt hypothermic and agonally breathing. Pt became unresponsive with loss of pulse; ACLS initiated. PEA arrest with ROSC after approximately 5 min s/p Epi x2.  Pt intubated during CODE BLUE. Per son pt on the day prior to presentation reported low blood sugar reads on his glucometer in the range of 80s. Son believes pt continued to take his insulin and oral diabetic regimen. Son states that pt was eating but may have been eating less in the last few days. Later on pt noted to have tonic-clonic Sz.  Long hospital course of ~1 month.    PEA arrest  Acute hypoxemic respiratory failure now chronic:  s/p intubation, failed extubation, now trach to vent  LUE VTE with cellulitis /infected left hand hematoma s/p bedside debridement 12/13   New onset Seizure likely due to anoxic brain injury  Unstageable sacral pressure wound     Currently stable and awaiting placement.  No acute cardiac intervention at this time while vented and bedbound.  Would continue asa.  Would add bb and titrate as BP/HR tolerate.  Would add statin; shock liver resolved.  Monitor LFTs.  Can follow-up as outpt when off vent and pulm status stable.  Will sign off for now; please call back with any further questions.
Status post incision and drainage of the left hand.  Change at this juncture appear to be mostly related to the DVT more than anything else I expect.  Unclear if the hematoma drained was secondarily infected, or not.  Culture is pending.  No leukocytosis.  He has had intermittent low-grade fevers through the beginning of this month, with higher grade fevers before that.    Suggestions  While stronger support emerges for infection, would favor stopping antibiotics  Discussed with Dr. Perez  Thank you for the courtesy of this referral.     Lester Chambers MD  Attending Physician  [facility]  Division of Infectious Diseases  [phone]  
72 year old male with multiple comorbidities, prolonged inpatient hospitalization, s/p trach/PEG placement on 12/5/22. Surgery consulted regarding bleeding from tracheostomy. PE does not reveal external bleeding from trach site, bleeding appears to be intra-tracheal.     - repeat H/H  - recommend pulmonology consult for possible bronchoscopy  - no surgical interventions necessary at this time, please reconsult PRN  - discussed with surgical attending, Dr. Dowling
72 year old male with a PMH of lacunar infarct, CHF (EF ~40% per son), CAD s/p PCI with stent and CABGx3 (~2020), s/p PPM (medtronic), HTN, COPD, DMII on insulin and PO meds, BPH, and CKD (stage II?) presenting to ICU s/p cardiac arrest. Admitted to ICU for post-arrest mgmt, course complicated by status epilepticus, ELMER on CKD and encephalopathy and continued fevers.  Surgery consulted for tracheostomy/PEG.    Plan:  Tracheostomy/PEG planning next week  Continue care per ICU
  Initial evaluation/Pulmonary Critical Care consultation requested on 12/11/2022  by Dr Perez    from Dr Crawford   Patient examined chart reviewed    HOSPITAL ADMISSION   PATIENT CAME  FROM (if information available)      REVIEW OF SYMPTOMS      Able to give (reliable) ROS  NO     PHYSICAL EXAM    HEENT Unremarkable  atraumatic   RESP Fair air entry EXP prolonged    Harsh breath sound Resp distres mild   CARDIAC S1 S2 No S3     NO JVD    ABDOMEN SOFT BS PRESENT NOT DISTENDED No hepatosplenomegaly   PEDAL EDEMA present No calf tenderness  NO rash       AGE/SEX.   72 m  DOA.  11/14/2022  CC .  11/14/2022 cardiac arrest     GENERAL DATA .   GOC.  12/11/2022 full code       ALLGY.  nka                            WT.   ..  12/2/2022 86  BMI.   ..    12/2/2022 29                          ICU STAY.   .. 11/29-12/9  COVID.   .. 12/2/2022 scv2 (-)   ..  11/20/2022 scv2 (-)   BEST PRACTICE ISSUES.    HOB ELEVATN. Yes  DVT PPLX.   ..   11/14/2022 hpsc    ALEJO PPLX.   ..      INFN PPLX.   ..  11/25 chlorhex .12%   .. 11/14 chlorhex 2%   SP SW ANTWAN.         DIET.    ..  12/8 vital 1.2 1200 gt   IV fl.  ..            PROCEDURES.  .. 12/5/2022 trach  .. 12/5/2022 peg       ABGS.  12/3/2022 ac 14/450/.3/5 750/46/75      VS/ PO/IO/ VENT/ DRIPS.    12/11/2022 100f 105 130/70    12/11/2022 ac 14/450/5/.3      PATIENT PRESENTATION.  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 Unknown how long pt unresponsive for at home. Blood sugar in the 40s with EMS s/p glucagon. On arrival to ER pt hypothermic and agonally breathing. Pt became unresponsive with loss of pulse; ACLS initiated. PEA arrest with ROSC after approximately 5 min s/p Epi x2.  Pt intubated during CODE BLUE. Per son pt on the day prior to presentation reported low blood sugar reads on his glucometer in the range of 80s. Son believes pt continued to take his insulin and oral diabetic regimen. Son states that pt was eating but may have been eating less in the last few days. Later on pt noted to have tonic-clonic Sz. ICU dx: 1) Hypoglycemia 2) PEA arrest 3) Takotsubo cardiomyopathy found on Echo 4) New onset Sz either 2 to hypoglycemia vs anoxic injury 5) ELMER 2 to ATN 6) Shock liver. Pt with improvement in MS. s/p trach/PEG on 12/5  Pt downgraded to floor  Pulm consulted 12/11/2022         PROBLEMS.  Sp  cac 11/14/2022   .. 11/14/2022 cac preceded by hypoglycemia followed by anoxic encephalopathy   RO VTE.  .. 12/4 v duplx (-)  .. 11/14 cta ch no pe ac fx lat r rib 4-6 sub cm l lo lobe nodules   Trach 12/5   Infection.  .. w 12/10 w 9.1   .. cxr 12/6 trach resolutn of plate atelectasis l base   .. mrsa 11/26 (-)   CAD.  .. PMH CABG 2014 Stent 2015   .. 11/14/2022 ekg paced qtc 568   .. 11/14 asa 81   CHF.   .. 11/14/2022 echo mod decr segmental lvsf apical lateral apiccal ant segment are abn takotsubo cmpthy pasp 42 .  anemia.  .. Hb 12/10 Hb 9.6   CONSTIPN.  .. 11/21 miralax   .. 11/21 senna   .. 11/20 keppra   DM.  .. 11/21 insulin  DECUB.  .. 12/2 collagenase   SEIZURES.  .. 11/23 valproic 500.3        ASSESSMENT RECOMMENDATIONS..  Sp  cac 11/14/2022   .. Monitor neuro signs of recovery  thoiugh seems unlikely   RO VTE.  .. VTE was ruled out 12/4    .. constinue dvt pplx     Trach 12/5   .. trach care   Infection.  ..  Monitor for vap    CAD.  .. Monitor   CHF.   .. 11/14/2022 echo mod decr segmental lvsf apical lateral apiccal ant segment are abn takotsubo cmpthy pasp 42 .  .. monitor for chf   anemia.  .. Monitor  Target Hb 7 (+)   DECUB.  .. 12/2 collagenase   SEIZURES.  .. sz meds as guided by neuro       TIME SPENT   Over 55 minutes aggregate care time spent on encounter; activities included   direct patient care, counseling and/or coordinating care reviewing notes, lab data/ imaging , discussion with multidisciplinary team/ patient  /family and explaining in detail risks, benefits, alternatives  of the recommendations     MARQUISE TAYLOR 72 m 11/14/2022 1948 Dr Leela Dowling

## 2023-01-12 NOTE — CONSULT NOTE ADULT - CONSULT REASON
vent
left hand wound
cardiac arrest
cardiac arrest, seizures
?Hand infection
bleeding
Tracheostomy/PEG

## 2023-01-12 NOTE — CONSULT NOTE ADULT - PROVIDER SPECIALTY LIST ADULT
Neurology
Surgery
Infectious Disease
Plastic Surgery
Pulmonology
Surgery
Cardiology
Vascular Surgery

## 2023-01-13 LAB
ANION GAP SERPL CALC-SCNC: 6 MMOL/L — SIGNIFICANT CHANGE UP (ref 5–17)
BUN SERPL-MCNC: 32 MG/DL — HIGH (ref 7–23)
CALCIUM SERPL-MCNC: 8.8 MG/DL — SIGNIFICANT CHANGE UP (ref 8.5–10.1)
CHLORIDE SERPL-SCNC: 109 MMOL/L — HIGH (ref 96–108)
CO2 SERPL-SCNC: 30 MMOL/L — SIGNIFICANT CHANGE UP (ref 22–31)
CREAT SERPL-MCNC: 0.95 MG/DL — SIGNIFICANT CHANGE UP (ref 0.5–1.3)
EGFR: 84 ML/MIN/1.73M2 — SIGNIFICANT CHANGE UP
GLUCOSE BLDC GLUCOMTR-MCNC: 134 MG/DL — HIGH (ref 70–99)
GLUCOSE BLDC GLUCOMTR-MCNC: 157 MG/DL — HIGH (ref 70–99)
GLUCOSE BLDC GLUCOMTR-MCNC: 163 MG/DL — HIGH (ref 70–99)
GLUCOSE BLDC GLUCOMTR-MCNC: 98 MG/DL — SIGNIFICANT CHANGE UP (ref 70–99)
GLUCOSE SERPL-MCNC: 128 MG/DL — HIGH (ref 70–99)
HCT VFR BLD CALC: 31.2 % — LOW (ref 39–50)
HGB BLD-MCNC: 9.7 G/DL — LOW (ref 13–17)
MAGNESIUM SERPL-MCNC: 2.3 MG/DL — SIGNIFICANT CHANGE UP (ref 1.6–2.6)
MCHC RBC-ENTMCNC: 30 PG — SIGNIFICANT CHANGE UP (ref 27–34)
MCHC RBC-ENTMCNC: 31.1 G/DL — LOW (ref 32–36)
MCV RBC AUTO: 96.6 FL — SIGNIFICANT CHANGE UP (ref 80–100)
NRBC # BLD: 0 /100 WBCS — SIGNIFICANT CHANGE UP (ref 0–0)
PHOSPHATE SERPL-MCNC: 3.7 MG/DL — SIGNIFICANT CHANGE UP (ref 2.5–4.5)
PLATELET # BLD AUTO: 233 K/UL — SIGNIFICANT CHANGE UP (ref 150–400)
POTASSIUM SERPL-MCNC: 4.3 MMOL/L — SIGNIFICANT CHANGE UP (ref 3.5–5.3)
POTASSIUM SERPL-SCNC: 4.3 MMOL/L — SIGNIFICANT CHANGE UP (ref 3.5–5.3)
RBC # BLD: 3.23 M/UL — LOW (ref 4.2–5.8)
RBC # FLD: 14.6 % — HIGH (ref 10.3–14.5)
SODIUM SERPL-SCNC: 145 MMOL/L — SIGNIFICANT CHANGE UP (ref 135–145)
TROPONIN I, HIGH SENSITIVITY RESULT: 24.5 NG/L — SIGNIFICANT CHANGE UP
WBC # BLD: 10.43 K/UL — SIGNIFICANT CHANGE UP (ref 3.8–10.5)
WBC # FLD AUTO: 10.43 K/UL — SIGNIFICANT CHANGE UP (ref 3.8–10.5)

## 2023-01-13 PROCEDURE — 71260 CT THORAX DX C+: CPT | Mod: 26

## 2023-01-13 PROCEDURE — 99232 SBSQ HOSP IP/OBS MODERATE 35: CPT

## 2023-01-13 RX ADMIN — BUDESONIDE AND FORMOTEROL FUMARATE DIHYDRATE 2 PUFF(S): 160; 4.5 AEROSOL RESPIRATORY (INHALATION) at 17:36

## 2023-01-13 RX ADMIN — Medication 1 APPLICATION(S): at 05:54

## 2023-01-13 RX ADMIN — ROBINUL 1 MILLIGRAM(S): 0.2 INJECTION INTRAMUSCULAR; INTRAVENOUS at 05:54

## 2023-01-13 RX ADMIN — Medication 30 MILLILITER(S): at 10:07

## 2023-01-13 RX ADMIN — LEVETIRACETAM 1500 MILLIGRAM(S): 250 TABLET, FILM COATED ORAL at 05:51

## 2023-01-13 RX ADMIN — Medication 1 APPLICATION(S): at 05:53

## 2023-01-13 RX ADMIN — APIXABAN 5 MILLIGRAM(S): 2.5 TABLET, FILM COATED ORAL at 05:55

## 2023-01-13 RX ADMIN — POLYETHYLENE GLYCOL 3350 17 GRAM(S): 17 POWDER, FOR SOLUTION ORAL at 11:48

## 2023-01-13 RX ADMIN — Medication 2: at 05:52

## 2023-01-13 RX ADMIN — Medication 1 MILLIGRAM(S): at 11:48

## 2023-01-13 RX ADMIN — BUDESONIDE AND FORMOTEROL FUMARATE DIHYDRATE 2 PUFF(S): 160; 4.5 AEROSOL RESPIRATORY (INHALATION) at 06:37

## 2023-01-13 RX ADMIN — SENNA PLUS 2 TABLET(S): 8.6 TABLET ORAL at 22:12

## 2023-01-13 RX ADMIN — CHLORHEXIDINE GLUCONATE 15 MILLILITER(S): 213 SOLUTION TOPICAL at 05:53

## 2023-01-13 RX ADMIN — Medication 3 MILLILITER(S): at 11:12

## 2023-01-13 RX ADMIN — INSULIN GLARGINE 20 UNIT(S): 100 INJECTION, SOLUTION SUBCUTANEOUS at 08:33

## 2023-01-13 RX ADMIN — Medication 3 MILLILITER(S): at 06:36

## 2023-01-13 RX ADMIN — Medication 3 MILLILITER(S): at 17:36

## 2023-01-13 RX ADMIN — PREGABALIN 1000 MICROGRAM(S): 225 CAPSULE ORAL at 11:48

## 2023-01-13 RX ADMIN — ATORVASTATIN CALCIUM 20 MILLIGRAM(S): 80 TABLET, FILM COATED ORAL at 22:12

## 2023-01-13 RX ADMIN — Medication 650 MILLIGRAM(S): at 10:06

## 2023-01-13 RX ADMIN — CHLORHEXIDINE GLUCONATE 1 APPLICATION(S): 213 SOLUTION TOPICAL at 05:57

## 2023-01-13 RX ADMIN — Medication 81 MILLIGRAM(S): at 11:48

## 2023-01-13 RX ADMIN — Medication 3 MILLILITER(S): at 23:14

## 2023-01-13 RX ADMIN — Medication 25 MILLIGRAM(S): at 05:56

## 2023-01-13 NOTE — PROGRESS NOTE ADULT - ASSESSMENT
GENERAL DATA .   [facility].  12/11/2022 full code       ALLGY.  nka                            WT. ..  12/2/2022 86  BMI. ..    12/2/2022 29                          ICU STAY.  .. 11/29-12/9  COVID.   .. 12/2/2022 scv2 (-)   .. 11/20/2022 scv2 (-)   BEST PRACTICE ISSUES.    HOB ELEVATN. Yes  DVT PPLX.    .. 1/3/2023 apixa 5.2 (vte)  12/12 l Subclav throm  ALEJO PPLX.   ..      INFN PPLX.   .. 11/25 chlorhex .12%   .. 11/14 chlorhex 2%   SP SW ANTWAN.         DIET.    ..  12/15 glucerna 1.2 1440 gt   IV fl.  ..            PROCEDURES.  .. 12/19 debridement of sacral wound   .. 12/5/2022 trach  .. 12/5/2022 peg   .. 12/12 r arm midline       ABGS.  1/6/2023 ac 16/450/.3/5 746/49/123     VS/ PO/IO/ VENT/ DRIPS.  1/13/2023 99f 82 100/60   1/13/2023 noct vent ac 14/450/5/.35         PATIENT PRESENTATION.  72 m doa 11/14/2022 cac    PMH  PMH CAD s/p PCI with stent 2015 and CABG 2014,   PMH  s/p medtronic PPM,   PMH CVA (lacunar infarct)    HOSPITAL COURSE   .. 1) PEA arrest with ROSC after approximately 5 min 11/14  Now communicative   .. 2) DVT 12/12 l Subclav thromS 12/15/2022 lvnx 80.2 1/3 changed to apixaban 5.2  .. 3) TRACH 12/5/2022 trach  .. 4) PEG12/5/2022 peg   .. 5) Cellulitis hand absc 12/13 mod enterococcus fecalis  staph epi 12/12-12/15/2022  cephalexin 12/15 vanco once  12/16-12/19 zosyn  .. 6) Vent weaning     PROBLEM ASSESSMENT RECOMMENDATIONS.  WEANING   .. wean as tolerated  .. 1/4/2022  If able to tolerate cpap 5/5 x 2 h will place on tc   .. 1/5/2023 DW Dr Valdovinos Tried on 35% tc   .. 1/6/2023 DW RT Pt to be on monitor during weaning trials and to be placed on vent at night and trach collar during daytime as tolerated   .. 1/7/2023 above reinforced to RT   .. 1/8/2023 placed on trach collar but has lot of secretns lasted 45 min   1/11/2023 tolerated tc several h  1/12/2023   TRACH LEAK  .. 1/9/2023 Notified by Hospitalist that pt is losing volunmes and that she is calling surgery to see if trach balloon has leak  HEMOPTYSIS  .. 1/13/2023 Pt having mild hemoptysis last 2 d   .. w 1/13/2023 w 10.4  .. Hb 1/13/2023 Hb 9.7   .. Cr 1/13/2023 Cr .9   .. ct 1/13/2023 ct ch cw 1/4/2020 Stable bectasis and cystic changes basal l lower lobe and lingula Stable ssa r base   .. 1/13/2023 Hemoptysis likely sec to suctioning trauma in setting of full dose ac   .. 1/13/2023 VTE unlikely as cause of ac as pt is on fd ac   .. 1/13/2023 was eval by surgrey no trach bleed as dw Dr Arzate   .. 1/13/2023 infection is possibe cause sergio given bectasis so if persists will consider abio   CHF   .. 11/14/2022 echo mod decr segmental lvsf apical lateral apiccal ant segment are abn takotsubo cmpthy pasp 42 .  .. Monitor for chf has chr hfref per echo   COPD   .. 12/30 symbicort 160   .. 1/7/2023 glycopyrrolate 1.2   .. 1/9/2023 ipratropium  .. continue bd ics for copd   Anemia.  .. Hb 1/12/2023 Hb 9.8  .. target hb 7 (+)  .. monitor   sp cac   .. good neuro recovery awake alert interactive   VTE  .. 1/3/2023 apixa 5.2 (vte)      OVERALL   WEAN as told   HEMOPTYSIS Consider abio if persists  TRACH LEAK If persists trach ch        TIME SPENT   Over 25 minutes aggregate care time spent on encounter; activities included   direct patient care, counseling and/or coordinating care reviewing notes, lab data/ imaging , discussion with multidisciplinary team/ patient  /family and explaining in detail risks, benefits, alternatives  of the recommendations     BRANDON CAMARILLO

## 2023-01-13 NOTE — PROGRESS NOTE ADULT - ASSESSMENT
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 Unknown how long pt unresponsive for at home. Blood sugar in the 40s with EMS s/p glucagon. On arrival to ER pt hypothermic and agonally breathing. Pt became unresponsive with loss of pulse; ACLS initiated. PEA arrest with ROSC after approximately 5 min s/p Epi x2.  Pt intubated during CODE BLUE. Per son pt on the day prior to presentation reported low blood sugar reads on his glucometer in the range of 80s. Son believes pt continued to take his insulin and oral diabetic regimen. Son states that pt was eating but may have been eating less in the last few days. Later on pt noted to have tonic-clonic Sz.     Problem/Plan-1:  Problem: PEA arrest;s/p Epi x 2. ROSC achieved  CAD/ CHF history:  Takotsubo cardiomyopathy found on Echo   stable  cont current meds. entresto at discharge per cards.     Problem/Plan-2:  Problem: Acute hypoxemic respiratory failure now chronic  s/p intubation , failed extubation, now trach to vent.   c/w vent management, trach collar in daytime   trach starting to bleed mildly today. discussed with surgery PA and will see   cont current bronchodilators. discussed with pulmonologist   1/13 still with some bloody secretions but remains afebrile. Is on AC. Will check ct chest and get sputum cxs. cbc stable. discussed with pulm       Problem/Plan-3:  Problem: LUE VTE with enterococcal faecalis cellulitis /infected left hand hematoma s/p bedside debridement 12/13   LUE edema and wound of left hand ; good radial pulse   --LUE venous duplex : occlusive thrombus in the left mid subclavian vein with partial   slow flow detected in the lateral subclavian vein.  --LUE arterial Duplex neg   --change lovenox to eliquis 5 mg bid.   --12/13 Hand surgery consult appreciated, s/p bedside debridement.   --antibiotic course completed       Problem/Plan-5:  Problem: New onset Seizure likely due to anoxic brain injury  cont keppra as per colleague discussion with Dr. Loo.       Problem/Plan-6:  Problem: ELMER 2 to ATN  resolved    Problem/Plan-7:  Problem: Shock liver.    due to PEA arrest. resolved    Problem/Plan-8:  Problem:DM2   A1c 8.8%  continue with current management   FS goal 140-180   continue with PEG feeds     Problem/Plan-9:  Problem: Hypophosphatemia --repleted     Problem/Plan-10:  Problem: Ustageable sacral pressure wound   s/p debridement 12/19    Problem/Plan-11:  Problem: Normocytic anemia   no e/o bleeding or bruising   H/H stable   low normal Vit B12 and folic acid --supplement       Moderate PCM and dysphagia: PEG tube feeding.     Stage 3 sacral ulcer: Clean, continue wound care    Preventative measures   dvt ppx>>>on DOAC  fall, aspiration  precautions  Dipso: Patient has no insurance and Family in process of getting guardianship. Patient will then need placement.

## 2023-01-13 NOTE — PROGRESS NOTE ADULT - SUBJECTIVE AND OBJECTIVE BOX
Patient is a 72y old  Male who presents with a chief complaint of s/p cardiac arrest, hypoglycemia (10 Brando 2023 10:35)      INTERVAL HPI/OVERNIGHT EVENTS:  Pt was seen and examined, no acute events.      MEDICATIONS  (STANDING):  albuterol/ipratropium for Nebulization 3 milliLiter(s) Nebulizer every 6 hours  apixaban 5 milliGRAM(s) Oral every 12 hours  aspirin  chewable 81 milliGRAM(s) Oral daily  atorvastatin 20 milliGRAM(s) Oral at bedtime  bacitracin   Ointment 1 Application(s) Topical two times a day  budesonide 160 MICROgram(s)/formoterol 4.5 MICROgram(s) Inhaler 2 Puff(s) Inhalation two times a day  chlorhexidine 0.12% Liquid 15 milliLiter(s) Oral Mucosa every 12 hours  chlorhexidine 2% Cloths 1 Application(s) Topical <User Schedule>  collagenase Ointment 1 Application(s) Topical two times a day  cyanocobalamin 1000 MICROGram(s) Oral daily  diphenhydrAMINE Injectable 50 milliGRAM(s) IV Push once  folic acid 1 milliGRAM(s) Oral daily  glycopyrrolate 1 milliGRAM(s) Oral two times a day  insulin glargine Injectable (LANTUS) 20 Unit(s) SubCutaneous every morning  insulin lispro (ADMELOG) corrective regimen sliding scale   SubCutaneous every 6 hours  levETIRAcetam 1500 milliGRAM(s) Oral two times a day  metoprolol tartrate 25 milliGRAM(s) Oral two times a day  polyethylene glycol 3350 17 Gram(s) Oral daily  senna 2 Tablet(s) Oral at bedtime  silver sulfADIAZINE 1% Cream 1 Application(s) Topical two times a day    MEDICATIONS  (PRN):  acetaminophen     Tablet .. 650 milliGRAM(s) Oral every 6 hours PRN Temp greater or equal to 38C (100.4F), Mild Pain (1 - 3)  albuterol    90 MICROgram(s) HFA Inhaler 2 Puff(s) Inhalation every 6 hours PRN Shortness of Breath and/or Wheezing  aluminum hydroxide/magnesium hydroxide/simethicone Suspension 30 milliLiter(s) Oral every 4 hours PRN Dyspepsia        Allergies  No Known Allergies      Vital Signs Last 24 Hrs  T(C): 37.7 (13 Jan 2023 10:51), Max: 37.7 (13 Jan 2023 10:51)  T(F): 99.9 (13 Jan 2023 10:51), Max: 99.9 (13 Jan 2023 10:51)  HR: 68 (13 Jan 2023 11:47) (63 - 88)  BP: 103/62 (13 Jan 2023 10:51) (103/62 - 130/67)  BP(mean): --  RR: 18 (13 Jan 2023 10:51) (18 - 18)  SpO2: 99% (13 Jan 2023 11:47) (99% - 100%)    Parameters below as of 13 Jan 2023 11:47  Patient On (Oxygen Delivery Method): tracheostomy collar              PHYSICAL EXAM:  GENERAL: Obese, NAD  CHEST/LUNG: Decreased air entry bibasally. no wheezing. s/p trach , on trach collar today  HEART: Regular rate and rhythm; + murmur  ABDOMEN: Soft, Nontender, Nondistended; Bowel sounds present.s/p PEG  EXTREMITIES:  Moving all four extremities spontaneously, No clubbing, cyanosis, or edema. localized left hand dorsal surface non tender likely chronic hematoma mass.   NERVOUS SYSTEM:  Grossly non focal. followed simple commands. communicating with writing   Psychiatry: AAO x 3, mood is appropriate         LABS:                                              9.7    10.43 )-----------( 233      ( 13 Jan 2023 11:45 )             31.2     01-13    145  |  109<H>  |  32<H>  ----------------------------<  128<H>  4.3   |  30  |  0.95    Ca    8.8      13 Jan 2023 11:45  Phos  3.7     01-13  Mg     2.3     01-13                        RADIOLOGY & ADDITIONAL TESTS:    Imaging Personally Reviewed:  [ ] YES  [ ] NO    Consultant(s) Notes Reviewed:  [ ] YES  [ ] NO    Care Discussed with Consultants/Other Providers [ ] YES  [ ] NO

## 2023-01-13 NOTE — PROVIDER CONTACT NOTE (CRITICAL VALUE NOTIFICATION) - SITUATION
Blood glucose 91 not for this patient [employer] director is aware bharati resulted for this pt
Pt c/o of CP 8/10, VSS (see flowsheet), NP Xander made aware, EKG ordered and due to be obtained. NP to meet patient at bedside.
Elevated PTT result
elevated PTT result

## 2023-01-13 NOTE — PROGRESS NOTE ADULT - SUBJECTIVE AND OBJECTIVE BOX
BRANDON CAMARILLO    [facility] [address]    Allergies    No Known Allergies    Intolerances        PAST MEDICAL & SURGICAL HISTORY:  HTN (hypertension)      HLD (hyperlipidemia)      Diabetes mellitus      Lacunar infarction      BPH (benign prostatic hyperplasia)      CHF (congestive heart failure)      Chronic obstructive pulmonary disease, unspecified COPD type      S/P CABG (coronary artery bypass graft)  2014          FAMILY HISTORY:      Home Medications:  aspirin 81 mg oral delayed release tablet: 1 tab(s) orally once a day (12 Jun 2020 17:35)  Liquifilm Tears preserved ophthalmic solution: 1 drop(s) to each affected eye 2 times a day (12 Jun 2020 17:35)      MEDICATIONS  (STANDING):  albuterol/ipratropium for Nebulization 3 milliLiter(s) Nebulizer every 6 hours  apixaban 5 milliGRAM(s) Oral every 12 hours  aspirin  chewable 81 milliGRAM(s) Oral daily  atorvastatin 20 milliGRAM(s) Oral at bedtime  bacitracin   Ointment 1 Application(s) Topical two times a day  budesonide 160 MICROgram(s)/formoterol 4.5 MICROgram(s) Inhaler 2 Puff(s) Inhalation two times a day  chlorhexidine 0.12% Liquid 15 milliLiter(s) Oral Mucosa every 12 hours  chlorhexidine 2% Cloths 1 Application(s) Topical <User Schedule>  collagenase Ointment 1 Application(s) Topical two times a day  cyanocobalamin 1000 MICROGram(s) Oral daily  diphenhydrAMINE Injectable 50 milliGRAM(s) IV Push once  folic acid 1 milliGRAM(s) Oral daily  glycopyrrolate 1 milliGRAM(s) Oral two times a day  insulin glargine Injectable (LANTUS) 20 Unit(s) SubCutaneous every morning  insulin lispro (ADMELOG) corrective regimen sliding scale   SubCutaneous every 6 hours  levETIRAcetam 1500 milliGRAM(s) Oral two times a day  metoprolol tartrate 25 milliGRAM(s) Oral two times a day  polyethylene glycol 3350 17 Gram(s) Oral daily  senna 2 Tablet(s) Oral at bedtime  silver sulfADIAZINE 1% Cream 1 Application(s) Topical two times a day    MEDICATIONS  (PRN):  acetaminophen     Tablet .. 650 milliGRAM(s) Oral every 6 hours PRN Temp greater or equal to 38C (100.4F), Mild Pain (1 - 3)  albuterol    90 MICROgram(s) HFA Inhaler 2 Puff(s) Inhalation every 6 hours PRN Shortness of Breath and/or Wheezing  aluminum hydroxide/magnesium hydroxide/simethicone Suspension 30 milliLiter(s) Oral every 4 hours PRN Dyspepsia      Diet, NPO with Tube Feed:   Tube Feeding Modality: Gastrostomy  Glucerna 1.2 Pedrito  Total Volume for 24 Hours (mL): 1440  Continuous  Until Goal Tube Feed Rate (mL per Hour): 60  Tube Feed Duration (in Hours): 24  Tube Feed Start Time: 14:30  Free Water Flush  Free Water Flush Instructions:  30 ml/hr via pump  Liquid Protein Supplement     Qty per Day:  1 (01-09-23 @ 13:45) [Active]          Vital Signs Last 24 Hrs  T(C): 36.4 (13 Jan 2023 04:47), Max: 37.1 (13 Jan 2023 00:09)  T(F): 97.6 (13 Jan 2023 04:47), Max: 98.7 (13 Jan 2023 00:09)  HR: 79 (13 Jan 2023 06:19) (63 - 89)  BP: 124/62 (13 Jan 2023 04:47) (97/58 - 127/62)  BP(mean): --  RR: 18 (13 Jan 2023 04:47) (18 - 20)  SpO2: 100% (13 Jan 2023 06:19) (97% - 100%)    Parameters below as of 12 Jan 2023 06:31  Patient On (Oxygen Delivery Method): ventilator          01-11-23 @ 07:01  -  01-12-23 @ 07:00  --------------------------------------------------------  IN: 671 mL / OUT: 1210 mL / NET: -539 mL    01-12-23 @ 07:01  -  01-13-23 @ 06:24  --------------------------------------------------------  IN: 1080 mL / OUT: 0 mL / NET: 1080 mL        Mode: CPAP with PS, FiO2: 35, PEEP: 5, PS: 5, MAP: 11      LABS:                        9.8    7.08  )-----------( 226      ( 12 Jan 2023 18:00 )             31.3     01-11    144  |  108  |  32<H>  ----------------------------<  125<H>  3.9   |  30  |  0.93    Ca    8.8      11 Jan 2023 07:40    TPro  6.3  /  Alb  2.3<L>  /  TBili  0.3  /  DBili  x   /  AST  19  /  ALT  27  /  AlkPhos  84  01-11              WBC:  WBC Count: 7.08 K/uL (01-12 @ 18:00)  WBC Count: 7.13 K/uL (01-11 @ 07:40)  WBC Count: 7.38 K/uL (01-09 @ 07:10)      MICROBIOLOGY:  RECENT CULTURES:                  Sodium:  Sodium, Serum: 144 mmol/L (01-11 @ 07:40)  Sodium, Serum: 139 mmol/L (01-09 @ 07:10)      0.93 mg/dL 01-11 @ 07:40  0.99 mg/dL 01-09 @ 07:10      Hemoglobin:  Hemoglobin: 9.8 g/dL (01-12 @ 18:00)  Hemoglobin: 9.8 g/dL (01-11 @ 07:40)  Hemoglobin: 9.2 g/dL (01-09 @ 07:10)      Platelets: Platelet Count - Automated: 226 K/uL (01-12 @ 18:00)  Platelet Count - Automated: 230 K/uL (01-11 @ 07:40)  Platelet Count - Automated: 220 K/uL (01-09 @ 07:10)      LIVER FUNCTIONS - ( 11 Jan 2023 07:40 )  Alb: 2.3 g/dL / Pro: 6.3 gm/dL / ALK PHOS: 84 U/L / ALT: 27 U/L / AST: 19 U/L / GGT: x                 RADIOLOGY & ADDITIONAL STUDIES:      MICROBIOLOGY:  RECENT CULTURES:

## 2023-01-14 LAB
ANION GAP SERPL CALC-SCNC: 7 MMOL/L — SIGNIFICANT CHANGE UP (ref 5–17)
BUN SERPL-MCNC: 29 MG/DL — HIGH (ref 7–23)
CALCIUM SERPL-MCNC: 8.7 MG/DL — SIGNIFICANT CHANGE UP (ref 8.5–10.1)
CHLORIDE SERPL-SCNC: 104 MMOL/L — SIGNIFICANT CHANGE UP (ref 96–108)
CO2 SERPL-SCNC: 30 MMOL/L — SIGNIFICANT CHANGE UP (ref 22–31)
CREAT SERPL-MCNC: 0.82 MG/DL — SIGNIFICANT CHANGE UP (ref 0.5–1.3)
EGFR: 92 ML/MIN/1.73M2 — SIGNIFICANT CHANGE UP
GLUCOSE BLDC GLUCOMTR-MCNC: 116 MG/DL — HIGH (ref 70–99)
GLUCOSE BLDC GLUCOMTR-MCNC: 156 MG/DL — HIGH (ref 70–99)
GLUCOSE BLDC GLUCOMTR-MCNC: 161 MG/DL — HIGH (ref 70–99)
GLUCOSE BLDC GLUCOMTR-MCNC: 169 MG/DL — HIGH (ref 70–99)
GLUCOSE BLDC GLUCOMTR-MCNC: 177 MG/DL — HIGH (ref 70–99)
GLUCOSE BLDC GLUCOMTR-MCNC: 82 MG/DL — SIGNIFICANT CHANGE UP (ref 70–99)
GLUCOSE SERPL-MCNC: 116 MG/DL — HIGH (ref 70–99)
GRAM STN FLD: SIGNIFICANT CHANGE UP
HCT VFR BLD CALC: 31.2 % — LOW (ref 39–50)
HGB BLD-MCNC: 10 G/DL — LOW (ref 13–17)
MCHC RBC-ENTMCNC: 30.8 PG — SIGNIFICANT CHANGE UP (ref 27–34)
MCHC RBC-ENTMCNC: 32.1 G/DL — SIGNIFICANT CHANGE UP (ref 32–36)
MCV RBC AUTO: 96 FL — SIGNIFICANT CHANGE UP (ref 80–100)
MRSA PCR RESULT.: SIGNIFICANT CHANGE UP
NRBC # BLD: 0 /100 WBCS — SIGNIFICANT CHANGE UP (ref 0–0)
PLATELET # BLD AUTO: 229 K/UL — SIGNIFICANT CHANGE UP (ref 150–400)
POTASSIUM SERPL-MCNC: 4 MMOL/L — SIGNIFICANT CHANGE UP (ref 3.5–5.3)
POTASSIUM SERPL-SCNC: 4 MMOL/L — SIGNIFICANT CHANGE UP (ref 3.5–5.3)
RBC # BLD: 3.25 M/UL — LOW (ref 4.2–5.8)
RBC # FLD: 14.6 % — HIGH (ref 10.3–14.5)
S AUREUS DNA NOSE QL NAA+PROBE: SIGNIFICANT CHANGE UP
SODIUM SERPL-SCNC: 141 MMOL/L — SIGNIFICANT CHANGE UP (ref 135–145)
SPECIMEN SOURCE: SIGNIFICANT CHANGE UP
WBC # BLD: 8.64 K/UL — SIGNIFICANT CHANGE UP (ref 3.8–10.5)
WBC # FLD AUTO: 8.64 K/UL — SIGNIFICANT CHANGE UP (ref 3.8–10.5)

## 2023-01-14 PROCEDURE — 99232 SBSQ HOSP IP/OBS MODERATE 35: CPT

## 2023-01-14 RX ADMIN — PREGABALIN 1000 MICROGRAM(S): 225 CAPSULE ORAL at 11:28

## 2023-01-14 RX ADMIN — LEVETIRACETAM 1500 MILLIGRAM(S): 250 TABLET, FILM COATED ORAL at 05:03

## 2023-01-14 RX ADMIN — APIXABAN 5 MILLIGRAM(S): 2.5 TABLET, FILM COATED ORAL at 05:04

## 2023-01-14 RX ADMIN — Medication 3 MILLILITER(S): at 17:08

## 2023-01-14 RX ADMIN — Medication 650 MILLIGRAM(S): at 10:07

## 2023-01-14 RX ADMIN — Medication 25 MILLIGRAM(S): at 17:36

## 2023-01-14 RX ADMIN — CHLORHEXIDINE GLUCONATE 15 MILLILITER(S): 213 SOLUTION TOPICAL at 05:03

## 2023-01-14 RX ADMIN — Medication 81 MILLIGRAM(S): at 11:10

## 2023-01-14 RX ADMIN — Medication 3 MILLILITER(S): at 05:48

## 2023-01-14 RX ADMIN — APIXABAN 5 MILLIGRAM(S): 2.5 TABLET, FILM COATED ORAL at 17:37

## 2023-01-14 RX ADMIN — CHLORHEXIDINE GLUCONATE 1 APPLICATION(S): 213 SOLUTION TOPICAL at 05:02

## 2023-01-14 RX ADMIN — INSULIN GLARGINE 20 UNIT(S): 100 INJECTION, SOLUTION SUBCUTANEOUS at 08:24

## 2023-01-14 RX ADMIN — Medication 1 APPLICATION(S): at 05:04

## 2023-01-14 RX ADMIN — LEVETIRACETAM 1500 MILLIGRAM(S): 250 TABLET, FILM COATED ORAL at 17:38

## 2023-01-14 RX ADMIN — POLYETHYLENE GLYCOL 3350 17 GRAM(S): 17 POWDER, FOR SOLUTION ORAL at 11:27

## 2023-01-14 RX ADMIN — BUDESONIDE AND FORMOTEROL FUMARATE DIHYDRATE 2 PUFF(S): 160; 4.5 AEROSOL RESPIRATORY (INHALATION) at 05:44

## 2023-01-14 RX ADMIN — ROBINUL 1 MILLIGRAM(S): 0.2 INJECTION INTRAMUSCULAR; INTRAVENOUS at 05:04

## 2023-01-14 RX ADMIN — ATORVASTATIN CALCIUM 20 MILLIGRAM(S): 80 TABLET, FILM COATED ORAL at 22:39

## 2023-01-14 RX ADMIN — BUDESONIDE AND FORMOTEROL FUMARATE DIHYDRATE 2 PUFF(S): 160; 4.5 AEROSOL RESPIRATORY (INHALATION) at 17:08

## 2023-01-14 RX ADMIN — Medication 3 MILLILITER(S): at 11:09

## 2023-01-14 RX ADMIN — Medication 1 MILLIGRAM(S): at 11:10

## 2023-01-14 RX ADMIN — Medication 1 APPLICATION(S): at 05:22

## 2023-01-14 RX ADMIN — Medication 650 MILLIGRAM(S): at 11:27

## 2023-01-14 RX ADMIN — Medication 2: at 11:27

## 2023-01-14 RX ADMIN — Medication 1 APPLICATION(S): at 17:39

## 2023-01-14 RX ADMIN — Medication 1 APPLICATION(S): at 17:37

## 2023-01-14 RX ADMIN — CHLORHEXIDINE GLUCONATE 15 MILLILITER(S): 213 SOLUTION TOPICAL at 17:37

## 2023-01-14 RX ADMIN — ROBINUL 1 MILLIGRAM(S): 0.2 INJECTION INTRAMUSCULAR; INTRAVENOUS at 17:36

## 2023-01-14 RX ADMIN — Medication 1 APPLICATION(S): at 17:36

## 2023-01-14 RX ADMIN — SENNA PLUS 2 TABLET(S): 8.6 TABLET ORAL at 22:39

## 2023-01-14 NOTE — PROGRESS NOTE ADULT - SUBJECTIVE AND OBJECTIVE BOX
Patient is a 72y old  Male who presents with a chief complaint of s/p cardiac arrest, hypoglycemia (10 Brando 2023 10:35)      INTERVAL HPI/OVERNIGHT EVENTS:  Pt was seen and examined, no acute events. bleeding much improved today       MEDICATIONS  (STANDING):  albuterol/ipratropium for Nebulization 3 milliLiter(s) Nebulizer every 6 hours  apixaban 5 milliGRAM(s) Oral every 12 hours  aspirin  chewable 81 milliGRAM(s) Oral daily  atorvastatin 20 milliGRAM(s) Oral at bedtime  bacitracin   Ointment 1 Application(s) Topical two times a day  budesonide 160 MICROgram(s)/formoterol 4.5 MICROgram(s) Inhaler 2 Puff(s) Inhalation two times a day  chlorhexidine 0.12% Liquid 15 milliLiter(s) Oral Mucosa every 12 hours  chlorhexidine 2% Cloths 1 Application(s) Topical <User Schedule>  collagenase Ointment 1 Application(s) Topical two times a day  cyanocobalamin 1000 MICROGram(s) Oral daily  diphenhydrAMINE Injectable 50 milliGRAM(s) IV Push once  folic acid 1 milliGRAM(s) Oral daily  glycopyrrolate 1 milliGRAM(s) Oral two times a day  insulin glargine Injectable (LANTUS) 20 Unit(s) SubCutaneous every morning  insulin lispro (ADMELOG) corrective regimen sliding scale   SubCutaneous every 6 hours  levETIRAcetam 1500 milliGRAM(s) Oral two times a day  metoprolol tartrate 25 milliGRAM(s) Oral two times a day  polyethylene glycol 3350 17 Gram(s) Oral daily  senna 2 Tablet(s) Oral at bedtime  silver sulfADIAZINE 1% Cream 1 Application(s) Topical two times a day    MEDICATIONS  (PRN):  acetaminophen     Tablet .. 650 milliGRAM(s) Oral every 6 hours PRN Temp greater or equal to 38C (100.4F), Mild Pain (1 - 3)  albuterol    90 MICROgram(s) HFA Inhaler 2 Puff(s) Inhalation every 6 hours PRN Shortness of Breath and/or Wheezing  aluminum hydroxide/magnesium hydroxide/simethicone Suspension 30 milliLiter(s) Oral every 4 hours PRN Dyspepsia        Allergies  No Known Allergies    Vital Signs Last 24 Hrs  T(C): 36.9 (14 Jan 2023 11:09), Max: 36.9 (14 Jan 2023 11:09)  T(F): 98.4 (14 Jan 2023 11:09), Max: 98.4 (14 Jan 2023 11:09)  HR: 68 (14 Jan 2023 11:27) (63 - 88)  BP: 108/67 (14 Jan 2023 11:09) (100/52 - 108/67)  BP(mean): --  RR: 18 (14 Jan 2023 11:09) (17 - 18)  SpO2: 100% (14 Jan 2023 11:27) (98% - 100%)    Parameters below as of 14 Jan 2023 11:27  Patient On (Oxygen Delivery Method): tracheostomy collar            PHYSICAL EXAM:  GENERAL: Obese, NAD  CHEST/LUNG: Decreased air entry bibasally. no wheezing. s/p trach , on trach collar today  HEART: Regular rate and rhythm; + murmur  ABDOMEN: Soft, Nontender, Nondistended; Bowel sounds present.s/p PEG  EXTREMITIES:  Moving all four extremities spontaneously, No clubbing, cyanosis, or edema.   NERVOUS SYSTEM:  Grossly non focal. followed simple commands. communicating with writing   Psychiatry: AAO x 3, mood is appropriate         LABS:                                              10.0   8.64  )-----------( 229      ( 14 Jan 2023 07:00 )             31.2     01-14    141  |  104  |  29<H>  ----------------------------<  116<H>  4.0   |  30  |  0.82    Ca    8.7      14 Jan 2023 07:00  Phos  3.7     01-13  Mg     2.3     01-13                              RADIOLOGY & ADDITIONAL TESTS:    Imaging Personally Reviewed:  [ ] YES  [ ] NO    Consultant(s) Notes Reviewed:  [ ] YES  [ ] NO    Care Discussed with Consultants/Other Providers [ ] YES  [ ] NO

## 2023-01-14 NOTE — PROGRESS NOTE ADULT - SUBJECTIVE AND OBJECTIVE BOX
BRANDON CAMARILLO    [facility] [address]    Allergies    No Known Allergies    Intolerances        PAST MEDICAL & SURGICAL HISTORY:  HTN (hypertension)      HLD (hyperlipidemia)      Diabetes mellitus      Lacunar infarction      BPH (benign prostatic hyperplasia)      CHF (congestive heart failure)      Chronic obstructive pulmonary disease, unspecified COPD type      S/P CABG (coronary artery bypass graft)  2014          FAMILY HISTORY:      Home Medications:  aspirin 81 mg oral delayed release tablet: 1 tab(s) orally once a day (12 Jun 2020 17:35)  Liquifilm Tears preserved ophthalmic solution: 1 drop(s) to each affected eye 2 times a day (12 Jun 2020 17:35)      MEDICATIONS  (STANDING):  albuterol/ipratropium for Nebulization 3 milliLiter(s) Nebulizer every 6 hours  apixaban 5 milliGRAM(s) Oral every 12 hours  aspirin  chewable 81 milliGRAM(s) Oral daily  atorvastatin 20 milliGRAM(s) Oral at bedtime  bacitracin   Ointment 1 Application(s) Topical two times a day  budesonide 160 MICROgram(s)/formoterol 4.5 MICROgram(s) Inhaler 2 Puff(s) Inhalation two times a day  chlorhexidine 0.12% Liquid 15 milliLiter(s) Oral Mucosa every 12 hours  chlorhexidine 2% Cloths 1 Application(s) Topical <User Schedule>  collagenase Ointment 1 Application(s) Topical two times a day  cyanocobalamin 1000 MICROGram(s) Oral daily  diphenhydrAMINE Injectable 50 milliGRAM(s) IV Push once  folic acid 1 milliGRAM(s) Oral daily  glycopyrrolate 1 milliGRAM(s) Oral two times a day  insulin glargine Injectable (LANTUS) 20 Unit(s) SubCutaneous every morning  insulin lispro (ADMELOG) corrective regimen sliding scale   SubCutaneous every 6 hours  levETIRAcetam 1500 milliGRAM(s) Oral two times a day  metoprolol tartrate 25 milliGRAM(s) Oral two times a day  polyethylene glycol 3350 17 Gram(s) Oral daily  senna 2 Tablet(s) Oral at bedtime  silver sulfADIAZINE 1% Cream 1 Application(s) Topical two times a day    MEDICATIONS  (PRN):  acetaminophen     Tablet .. 650 milliGRAM(s) Oral every 6 hours PRN Temp greater or equal to 38C (100.4F), Mild Pain (1 - 3)  albuterol    90 MICROgram(s) HFA Inhaler 2 Puff(s) Inhalation every 6 hours PRN Shortness of Breath and/or Wheezing  aluminum hydroxide/magnesium hydroxide/simethicone Suspension 30 milliLiter(s) Oral every 4 hours PRN Dyspepsia      Diet, NPO with Tube Feed:   Tube Feeding Modality: Gastrostomy  Glucerna 1.2 Pedrito  Total Volume for 24 Hours (mL): 1440  Continuous  Until Goal Tube Feed Rate (mL per Hour): 60  Tube Feed Duration (in Hours): 24  Tube Feed Start Time: 14:30  Free Water Flush  Free Water Flush Instructions:  30 ml/hr via pump  Liquid Protein Supplement     Qty per Day:  1 (01-09-23 @ 13:45) [Active]          Vital Signs Last 24 Hrs  T(C): 36.6 (14 Jan 2023 04:55), Max: 37.7 (13 Jan 2023 10:51)  T(F): 97.8 (14 Jan 2023 04:55), Max: 99.9 (13 Jan 2023 10:51)  HR: 77 (14 Jan 2023 08:35) (64 - 88)  BP: 102/57 (14 Jan 2023 04:55) (100/52 - 107/63)  BP(mean): --  RR: 18 (14 Jan 2023 04:55) (17 - 18)  SpO2: 100% (14 Jan 2023 08:35) (99% - 100%)    Parameters below as of 14 Jan 2023 06:41  Patient On (Oxygen Delivery Method): ventilator            Mode: standby      LABS:                        10.0   8.64  )-----------( 229      ( 14 Jan 2023 07:00 )             31.2     01-14    141  |  104  |  29<H>  ----------------------------<  116<H>  4.0   |  30  |  0.82    Ca    8.7      14 Jan 2023 07:00  Phos  3.7     01-13  Mg     2.3     01-13                WBC:  WBC Count: 8.64 K/uL (01-14 @ 07:00)  WBC Count: 10.43 K/uL (01-13 @ 11:45)  WBC Count: 7.08 K/uL (01-12 @ 18:00)  WBC Count: 7.13 K/uL (01-11 @ 07:40)      MICROBIOLOGY:  RECENT CULTURES:  01-13 Trach Asp Tracheal Aspirate XXXX   Moderate polymorphonuclear leukocytes per low power field  Rare Squamous epithelial cells per low power field  Moderate Gram Negative Rods seen per oil power field  Moderate Gram positive cocci in pairs seen per oil power field XXXX                    Sodium:  Sodium, Serum: 141 mmol/L (01-14 @ 07:00)  Sodium, Serum: 145 mmol/L (01-13 @ 11:45)  Sodium, Serum: 144 mmol/L (01-11 @ 07:40)      0.82 mg/dL 01-14 @ 07:00  0.95 mg/dL 01-13 @ 11:45  0.93 mg/dL 01-11 @ 07:40      Hemoglobin:  Hemoglobin: 10.0 g/dL (01-14 @ 07:00)  Hemoglobin: 9.7 g/dL (01-13 @ 11:45)  Hemoglobin: 9.8 g/dL (01-12 @ 18:00)  Hemoglobin: 9.8 g/dL (01-11 @ 07:40)      Platelets: Platelet Count - Automated: 229 K/uL (01-14 @ 07:00)  Platelet Count - Automated: 233 K/uL (01-13 @ 11:45)  Platelet Count - Automated: 226 K/uL (01-12 @ 18:00)  Platelet Count - Automated: 230 K/uL (01-11 @ 07:40)              RADIOLOGY & ADDITIONAL STUDIES:      MICROBIOLOGY:  RECENT CULTURES:  01-13 Trach Asp Tracheal Aspirate XXXX   Moderate polymorphonuclear leukocytes per low power field  Rare Squamous epithelial cells per low power field  Moderate Gram Negative Rods seen per oil power field  Moderate Gram positive cocci in pairs seen per oil power field XXXX

## 2023-01-14 NOTE — PROGRESS NOTE ADULT - ASSESSMENT
REVIEW OF SYMPTOMS      Able to give (reliable) ROS  NO     PHYSICAL EXAM    HEENT Unremarkable  atraumatic   RESP Fair air entry EXP prolonged    Harsh breath sound Resp distres mild   CARDIAC S1 S2 No S3     NO JVD    ABDOMEN SOFT BS PRESENT NOT DISTENDED No hepatosplenomegaly   PEDAL EDEMA present No calf tenderness  NO rash       GENERAL DATA .   GOC.  12/11/2022 full code       ALLGY.  nka                            WT. ..  12/2/2022 86  BMI. ..    12/2/2022 29                          ICU STAY.  .. 11/29-12/9  COVID.   .. 12/2/2022 scv2 (-)   .. 11/20/2022 scv2 (-)   BEST PRACTICE ISSUES.    HOB ELEVATN. Yes  DVT PPLX.    .. 1/3/2023 apixa 5.2 (vte)  12/12 l Subclav throm  ALEJO PPLX.   ..      INFN PPLX.   .. 11/25 chlorhex .12%   .. 11/14 chlorhex 2%   SP SW ANTWAN.         DIET.    ..  12/15 glucerna 1.2 1440 gt   IV fl.  ..            PROCEDURES.  .. 12/19 debridement of sacral wound     ABGS.  1/6/2023 ac 16/450/.3/5 746/49/123     VS/ PO/IO/ VENT/ DRIPS.  1/14/2023 99f 60 120/60     PATIENT PRESENTATION.  72 m doa 11/14/2022 cac    PMH  PMH CAD s/p PCI with stent 2015 and CABG 2014,   PMH  s/p medtronic PPM,   PMH CVA (lacunar infarct)    HOSPITAL COURSE   .. 1) PEA arrest with ROSC after approximately 5 min 11/14  Now communicative   .. 2) DVT 12/12 l Subclav thromS 12/15/2022 lvnx 80.2 1/3 changed to apixaban 5.2  .. 3) TRACH 12/5/2022 trach  .. 4) PEG12/5/2022 peg   .. 5) Cellulitis hand absc 12/13 mod enterococcus fecalis  staph epi 12/12-12/15/2022  cephalexin 12/15 vanco once  12/16-12/19 zosyn  .. 6) Vent weaning     PROBLEM ASSESSMENT RECOMMENDATIONS.  WEANING   .. wean as tolerated  .. 1/4/2022  If able to tolerate cpap 5/5 x 2 h will place on tc   .. 1/5/2023 DW Dr Valdovinos Tried on 35% tc   .. 1/6/2023 DW RT Pt to be on monitor during weaning trials and to be placed on vent at night and trach collar during daytime as tolerated   .. 1/7/2023 above reinforced to RT   .. 1/8/2023 placed on trach collar but has lot of secretns lasted 45 min   1/11/2023 tolerated tc several h  1/12/2023   TRACH LEAK  .. 1/9/2023 Notified by Hospitalist that pt is losing volunmes and that she is calling surgery to see if trach balloon has leak  HEMOPTYSIS  .. 1/13/2023 Pt having mild hemoptysis last 2 d   .. w 1/13-1/14/2023 w 10.4- 8.6   .. Hb 1/13/2023 Hb 9.7   .. Cr 1/13/2023 Cr .9   .. ct 1/13/2023 ct ch cw 1/4/2020 Stable bectasis and cystic changes basal l lower lobe and lingula Stable ssa r base   .. 1/13/2023 Hemoptysis likely sec to suctioning trauma in setting of full dose ac   .. 1/13/2023 VTE unlikely as cause of ac as pt is on fd ac   .. 1/13/2023 was eval by surgrey no trach bleed as dw Dr Arzate   .. 1/13/2023 infection is possibe cause sergio given bectasis so if persists will consider abio   CHF   .. 11/14/2022 echo mod decr segmental lvsf apical lateral apiccal ant segment are abn takotsubo cmpthy pasp 42 .  .. Monitor for chf has chr hfref per echo   COPD   .. 12/30 symbicort 160   .. 1/7/2023 glycopyrrolate 1.2   .. 1/9/2023 ipratropium  .. continue bd ics for copd   Anemia.  .. Hb 1/12-1/14/2023 Hb 9.8- 10   .. target hb 7 (+)  .. monitor   sp cac   .. good neuro recovery awake alert interactive   VTE  .. 1/3/2023 apixa 5.2 (vte)      OVERALL   WEAN as told   HEMOPTYSIS Consider abio if persists  TRACH LEAK If persists trach ch    TIME SPENT   Over 25 minutes aggregate care time spent on encounter; activities included   direct patient care, counseling and/or coordinating care reviewing notes, lab data/ imaging , discussion with multidisciplinary team/ patient  /family and explaining in detail risks, benefits, alternatives  of the recommendations     BRANDON CAMARILLO

## 2023-01-14 NOTE — PROGRESS NOTE ADULT - ASSESSMENT
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 Unknown how long pt unresponsive for at home. Blood sugar in the 40s with EMS s/p glucagon. On arrival to ER pt hypothermic and agonally breathing. Pt became unresponsive with loss of pulse; ACLS initiated. PEA arrest with ROSC after approximately 5 min s/p Epi x2.  Pt intubated during CODE BLUE. Per son pt on the day prior to presentation reported low blood sugar reads on his glucometer in the range of 80s. Son believes pt continued to take his insulin and oral diabetic regimen. Son states that pt was eating but may have been eating less in the last few days. Later on pt noted to have tonic-clonic Sz.     Problem/Plan-1:  Problem: PEA arrest;s/p Epi x 2. ROSC achieved  CAD/ CHF history:  Takotsubo cardiomyopathy found on Echo   stable  cont current meds. entresto at discharge per cards.     Problem/Plan-2:  Problem: Acute hypoxemic respiratory failure now chronic  s/p intubation , failed extubation, now trach to vent.   c/w vent management, trach collar in daytime   trach starting to bleed mildly today. discussed with surgery PA and will see   cont current bronchodilators. discussed with pulmonologist   1/13 still with some bloody secretions but remains afebrile. Is on AC. Will check ct chest and get sputum cxs. cbc stable. discussed with pulm   1/14 bloody secretion much improved with out intervention. CT chest shows stable bronchiectasis. no new pna. remains afebrile. could be due to suction trauma       Problem/Plan-3:  Problem: LUE VTE with enterococcal faecalis cellulitis /infected left hand hematoma s/p bedside debridement 12/13   LUE edema and wound of left hand ; good radial pulse   --LUE venous duplex : occlusive thrombus in the left mid subclavian vein with partial   slow flow detected in the lateral subclavian vein.  --LUE arterial Duplex neg   --change lovenox to eliquis 5 mg bid.   --12/13 Hand surgery consult appreciated, s/p bedside debridement.   --antibiotic course completed       Problem/Plan-5:  Problem: New onset Seizure likely due to anoxic brain injury  cont keppra as per colleague discussion with Dr. Loo.       Problem/Plan-6:  Problem: ELMER 2 to ATN  resolved    Problem/Plan-7:  Problem: Shock liver.    due to PEA arrest. resolved    Problem/Plan-8:  Problem:DM2   A1c 8.8%  continue with current management   FS goal 140-180   continue with PEG feeds     Problem/Plan-9:  Problem: Hypophosphatemia --repleted     Problem/Plan-10:  Problem: Ustageable sacral pressure wound   s/p debridement 12/19    Problem/Plan-11:  Problem: Normocytic anemia   no e/o bleeding or bruising   H/H stable   low normal Vit B12 and folic acid --supplement       Moderate PCM and dysphagia: PEG tube feeding.     Stage 3 sacral ulcer: Clean, continue wound care    Preventative measures   dvt ppx>>>on DOAC  fall, aspiration  precautions  Dipso: Patient has no insurance and Family in process of getting guardianship. Patient will then need placement.

## 2023-01-15 LAB
-  AMIKACIN: SIGNIFICANT CHANGE UP
-  AMOXICILLIN/CLAVULANIC ACID: SIGNIFICANT CHANGE UP
-  AMPICILLIN/SULBACTAM: SIGNIFICANT CHANGE UP
-  AMPICILLIN: SIGNIFICANT CHANGE UP
-  AZTREONAM: SIGNIFICANT CHANGE UP
-  CEFAZOLIN: SIGNIFICANT CHANGE UP
-  CEFEPIME: SIGNIFICANT CHANGE UP
-  CEFOXITIN: SIGNIFICANT CHANGE UP
-  CEFTRIAXONE: SIGNIFICANT CHANGE UP
-  CIPROFLOXACIN: SIGNIFICANT CHANGE UP
-  ERTAPENEM: SIGNIFICANT CHANGE UP
-  GENTAMICIN: SIGNIFICANT CHANGE UP
-  IMIPENEM: SIGNIFICANT CHANGE UP
-  LEVOFLOXACIN: SIGNIFICANT CHANGE UP
-  MEROPENEM: SIGNIFICANT CHANGE UP
-  PIPERACILLIN/TAZOBACTAM: SIGNIFICANT CHANGE UP
-  TOBRAMYCIN: SIGNIFICANT CHANGE UP
-  TRIMETHOPRIM/SULFAMETHOXAZOLE: SIGNIFICANT CHANGE UP
CULTURE RESULTS: SIGNIFICANT CHANGE UP
GLUCOSE BLDC GLUCOMTR-MCNC: 102 MG/DL — HIGH (ref 70–99)
GLUCOSE BLDC GLUCOMTR-MCNC: 143 MG/DL — HIGH (ref 70–99)
GLUCOSE BLDC GLUCOMTR-MCNC: 186 MG/DL — HIGH (ref 70–99)
GLUCOSE BLDC GLUCOMTR-MCNC: 86 MG/DL — SIGNIFICANT CHANGE UP (ref 70–99)
GRAM STN FLD: SIGNIFICANT CHANGE UP
METHOD TYPE: SIGNIFICANT CHANGE UP
ORGANISM # SPEC MICROSCOPIC CNT: SIGNIFICANT CHANGE UP
ORGANISM # SPEC MICROSCOPIC CNT: SIGNIFICANT CHANGE UP
SPECIMEN SOURCE: SIGNIFICANT CHANGE UP

## 2023-01-15 PROCEDURE — 99232 SBSQ HOSP IP/OBS MODERATE 35: CPT

## 2023-01-15 RX ORDER — SCOPALAMINE 1 MG/3D
1 PATCH, EXTENDED RELEASE TRANSDERMAL
Refills: 0 | Status: DISCONTINUED | OUTPATIENT
Start: 2023-01-15 | End: 2023-03-11

## 2023-01-15 RX ADMIN — Medication 2: at 23:33

## 2023-01-15 RX ADMIN — Medication 1 APPLICATION(S): at 18:09

## 2023-01-15 RX ADMIN — Medication 3 MILLILITER(S): at 11:18

## 2023-01-15 RX ADMIN — CHLORHEXIDINE GLUCONATE 15 MILLILITER(S): 213 SOLUTION TOPICAL at 18:09

## 2023-01-15 RX ADMIN — INSULIN GLARGINE 20 UNIT(S): 100 INJECTION, SOLUTION SUBCUTANEOUS at 08:53

## 2023-01-15 RX ADMIN — BUDESONIDE AND FORMOTEROL FUMARATE DIHYDRATE 2 PUFF(S): 160; 4.5 AEROSOL RESPIRATORY (INHALATION) at 17:05

## 2023-01-15 RX ADMIN — Medication 25 MILLIGRAM(S): at 18:08

## 2023-01-15 RX ADMIN — ATORVASTATIN CALCIUM 20 MILLIGRAM(S): 80 TABLET, FILM COATED ORAL at 21:36

## 2023-01-15 RX ADMIN — APIXABAN 5 MILLIGRAM(S): 2.5 TABLET, FILM COATED ORAL at 06:17

## 2023-01-15 RX ADMIN — SENNA PLUS 2 TABLET(S): 8.6 TABLET ORAL at 21:36

## 2023-01-15 RX ADMIN — Medication 1 APPLICATION(S): at 06:16

## 2023-01-15 RX ADMIN — Medication 81 MILLIGRAM(S): at 11:55

## 2023-01-15 RX ADMIN — BUDESONIDE AND FORMOTEROL FUMARATE DIHYDRATE 2 PUFF(S): 160; 4.5 AEROSOL RESPIRATORY (INHALATION) at 05:22

## 2023-01-15 RX ADMIN — APIXABAN 5 MILLIGRAM(S): 2.5 TABLET, FILM COATED ORAL at 18:08

## 2023-01-15 RX ADMIN — ROBINUL 1 MILLIGRAM(S): 0.2 INJECTION INTRAMUSCULAR; INTRAVENOUS at 06:18

## 2023-01-15 RX ADMIN — Medication 1 APPLICATION(S): at 06:23

## 2023-01-15 RX ADMIN — PREGABALIN 1000 MICROGRAM(S): 225 CAPSULE ORAL at 11:56

## 2023-01-15 RX ADMIN — Medication 1 APPLICATION(S): at 18:08

## 2023-01-15 RX ADMIN — Medication 3 MILLILITER(S): at 00:04

## 2023-01-15 RX ADMIN — Medication 1 MILLIGRAM(S): at 11:54

## 2023-01-15 RX ADMIN — Medication 1 APPLICATION(S): at 06:17

## 2023-01-15 RX ADMIN — LEVETIRACETAM 1500 MILLIGRAM(S): 250 TABLET, FILM COATED ORAL at 18:07

## 2023-01-15 RX ADMIN — CHLORHEXIDINE GLUCONATE 1 APPLICATION(S): 213 SOLUTION TOPICAL at 06:19

## 2023-01-15 RX ADMIN — ROBINUL 1 MILLIGRAM(S): 0.2 INJECTION INTRAMUSCULAR; INTRAVENOUS at 18:08

## 2023-01-15 RX ADMIN — Medication 3 MILLILITER(S): at 05:23

## 2023-01-15 RX ADMIN — Medication 25 MILLIGRAM(S): at 06:17

## 2023-01-15 RX ADMIN — LEVETIRACETAM 1500 MILLIGRAM(S): 250 TABLET, FILM COATED ORAL at 06:18

## 2023-01-15 RX ADMIN — SCOPALAMINE 1 PATCH: 1 PATCH, EXTENDED RELEASE TRANSDERMAL at 23:34

## 2023-01-15 RX ADMIN — POLYETHYLENE GLYCOL 3350 17 GRAM(S): 17 POWDER, FOR SOLUTION ORAL at 11:56

## 2023-01-15 RX ADMIN — CHLORHEXIDINE GLUCONATE 15 MILLILITER(S): 213 SOLUTION TOPICAL at 06:17

## 2023-01-15 RX ADMIN — Medication 3 MILLILITER(S): at 17:02

## 2023-01-15 NOTE — PROGRESS NOTE ADULT - SUBJECTIVE AND OBJECTIVE BOX
Patient is a 72y old  Male who presents with a chief complaint of s/p cardiac arrest, hypoglycemia (10 Brando 2023 10:35)      INTERVAL HPI/OVERNIGHT EVENTS:  Pt was seen and examined, no acute events. bleeding much improved today       MEDICATIONS  (STANDING):  albuterol/ipratropium for Nebulization 3 milliLiter(s) Nebulizer every 6 hours  apixaban 5 milliGRAM(s) Oral every 12 hours  aspirin  chewable 81 milliGRAM(s) Oral daily  atorvastatin 20 milliGRAM(s) Oral at bedtime  bacitracin   Ointment 1 Application(s) Topical two times a day  budesonide 160 MICROgram(s)/formoterol 4.5 MICROgram(s) Inhaler 2 Puff(s) Inhalation two times a day  chlorhexidine 0.12% Liquid 15 milliLiter(s) Oral Mucosa every 12 hours  chlorhexidine 2% Cloths 1 Application(s) Topical <User Schedule>  collagenase Ointment 1 Application(s) Topical two times a day  cyanocobalamin 1000 MICROGram(s) Oral daily  diphenhydrAMINE Injectable 50 milliGRAM(s) IV Push once  folic acid 1 milliGRAM(s) Oral daily  glycopyrrolate 1 milliGRAM(s) Oral two times a day  insulin glargine Injectable (LANTUS) 20 Unit(s) SubCutaneous every morning  insulin lispro (ADMELOG) corrective regimen sliding scale   SubCutaneous every 6 hours  levETIRAcetam 1500 milliGRAM(s) Oral two times a day  metoprolol tartrate 25 milliGRAM(s) Oral two times a day  polyethylene glycol 3350 17 Gram(s) Oral daily  senna 2 Tablet(s) Oral at bedtime  silver sulfADIAZINE 1% Cream 1 Application(s) Topical two times a day    MEDICATIONS  (PRN):  acetaminophen     Tablet .. 650 milliGRAM(s) Oral every 6 hours PRN Temp greater or equal to 38C (100.4F), Mild Pain (1 - 3)  albuterol    90 MICROgram(s) HFA Inhaler 2 Puff(s) Inhalation every 6 hours PRN Shortness of Breath and/or Wheezing  aluminum hydroxide/magnesium hydroxide/simethicone Suspension 30 milliLiter(s) Oral every 4 hours PRN Dyspepsia        Allergies  No Known Allergies    Vital Signs Last 24 Hrs  T(C): 36.9 (15 Brando 2023 05:22), Max: 37.3 (14 Jan 2023 16:37)  T(F): 98.5 (15 Brando 2023 05:22), Max: 99.2 (14 Jan 2023 16:37)  HR: 69 (15 Brando 2023 08:34) (63 - 93)  BP: 132/74 (15 Brando 2023 05:22) (108/67 - 135/89)  BP(mean): --  RR: 19 (15 Brando 2023 05:22) (15 - 20)  SpO2: 100% (15 Brando 2023 08:34) (98% - 100%)    Parameters below as of 14 Jan 2023 23:47  Patient On (Oxygen Delivery Method): ventilator              PHYSICAL EXAM:  GENERAL: Obese, NAD  CHEST/LUNG: Decreased air entry bibasally. no wheezing. s/p trach , on trach collar today  HEART: Regular rate and rhythm; + murmur  ABDOMEN: Soft, Nontender, Nondistended; Bowel sounds present.s/p PEG  EXTREMITIES:  Moving all four extremities spontaneously, No clubbing, cyanosis, or edema.   NERVOUS SYSTEM:  Grossly non focal. followed simple commands. communicating with writing   Psychiatry: AAO x 3, mood is appropriate         LABS:                                                         10.0   8.64  )-----------( 229      ( 14 Jan 2023 07:00 )             31.2     01-14    141  |  104  |  29<H>  ----------------------------<  116<H>  4.0   |  30  |  0.82    Ca    8.7      14 Jan 2023 07:00  Phos  3.7     01-13  Mg     2.3     01-13                                  RADIOLOGY & ADDITIONAL TESTS:    Imaging Personally Reviewed:  [ ] YES  [ ] NO    Consultant(s) Notes Reviewed:  [ ] YES  [ ] NO    Care Discussed with Consultants/Other Providers [ ] YES  [ ] NO

## 2023-01-15 NOTE — PROGRESS NOTE ADULT - ASSESSMENT
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 Unknown how long pt unresponsive for at home. Blood sugar in the 40s with EMS s/p glucagon. On arrival to ER pt hypothermic and agonally breathing. Pt became unresponsive with loss of pulse; ACLS initiated. PEA arrest with ROSC after approximately 5 min s/p Epi x2.  Pt intubated during CODE BLUE. Per son pt on the day prior to presentation reported low blood sugar reads on his glucometer in the range of 80s. Son believes pt continued to take his insulin and oral diabetic regimen. Son states that pt was eating but may have been eating less in the last few days. Later on pt noted to have tonic-clonic Sz.     Problem/Plan-1:  Problem: PEA arrest;s/p Epi x 2. ROSC achieved  CAD/ CHF history:  Takotsubo cardiomyopathy found on Echo   stable  cont current meds. entresto at discharge per cards.     Problem/Plan-2:  Problem: Acute hypoxemic respiratory failure now chronic  s/p intubation , failed extubation, now trach to vent.   c/w vent management, trach collar in daytime   trach starting to bleed mildly today. discussed with surgery PA and will see   cont current bronchodilators. discussed with pulmonologist   1/13 still with some bloody secretions but remains afebrile. Is on AC. Will check ct chest and get sputum cxs. cbc stable. discussed with pulm   1/14 bloody secretion much improved with out intervention. CT chest shows stable bronchiectasis. no new pna. remains afebrile. could be due to suction trauma       Problem/Plan-3:  Problem: LUE VTE with enterococcal faecalis cellulitis /infected left hand hematoma s/p bedside debridement 12/13   LUE edema and wound of left hand ; good radial pulse   --LUE venous duplex : occlusive thrombus in the left mid subclavian vein with partial   slow flow detected in the lateral subclavian vein.  --LUE arterial Duplex neg   --change lovenox to eliquis 5 mg bid.   --12/13 Hand surgery consult appreciated, s/p bedside debridement.   --antibiotic course completed       Problem/Plan-5:  Problem: New onset Seizure likely due to anoxic brain injury  cont keppra as per colleague discussion with Dr. Loo.       Problem/Plan-6:  Problem: ELMER 2 to ATN  resolved    Problem/Plan-7:  Problem: Shock liver.    due to PEA arrest. resolved    Problem/Plan-8:  Problem:DM2   A1c 8.8%  continue with current management   FS goal 140-180   continue with PEG feeds     Problem/Plan-9:  Problem: Hypophosphatemia --repleted     Problem/Plan-10:  Problem: Ustageable sacral pressure wound   s/p debridement 12/19    Problem/Plan-11:  Problem: Normocytic anemia   no e/o bleeding or bruising   H/H stable   low normal Vit B12 and folic acid --supplement       Moderate PCM and dysphagia: PEG tube feeding.     Stage 3 sacral ulcer: Clean, continue wound care    Preventative measures   dvt ppx>>>on DOAC  fall, aspiration  precautions  Dipso: Patient has no insurance and Family in process of getting guardianship. Patient will then need placement.

## 2023-01-15 NOTE — PROGRESS NOTE ADULT - SUBJECTIVE AND OBJECTIVE BOX
BRANDON CAMARILLO    [address] [address]    Allergies    No Known Allergies    Intolerances        PAST MEDICAL & SURGICAL HISTORY:  HTN (hypertension)      HLD (hyperlipidemia)      Diabetes mellitus      Lacunar infarction      BPH (benign prostatic hyperplasia)      CHF (congestive heart failure)      Chronic obstructive pulmonary disease, unspecified COPD type      S/P CABG (coronary artery bypass graft)  2014          FAMILY HISTORY:      Home Medications:  aspirin 81 mg oral delayed release tablet: 1 tab(s) orally once a day (12 Jun 2020 17:35)  Liquifilm Tears preserved ophthalmic solution: 1 drop(s) to each affected eye 2 times a day (12 Jun 2020 17:35)      MEDICATIONS  (STANDING):  albuterol/ipratropium for Nebulization 3 milliLiter(s) Nebulizer every 6 hours  apixaban 5 milliGRAM(s) Oral every 12 hours  aspirin  chewable 81 milliGRAM(s) Oral daily  atorvastatin 20 milliGRAM(s) Oral at bedtime  bacitracin   Ointment 1 Application(s) Topical two times a day  budesonide 160 MICROgram(s)/formoterol 4.5 MICROgram(s) Inhaler 2 Puff(s) Inhalation two times a day  chlorhexidine 0.12% Liquid 15 milliLiter(s) Oral Mucosa every 12 hours  chlorhexidine 2% Cloths 1 Application(s) Topical <User Schedule>  collagenase Ointment 1 Application(s) Topical two times a day  cyanocobalamin 1000 MICROGram(s) Oral daily  diphenhydrAMINE Injectable 50 milliGRAM(s) IV Push once  folic acid 1 milliGRAM(s) Oral daily  glycopyrrolate 1 milliGRAM(s) Oral two times a day  insulin glargine Injectable (LANTUS) 20 Unit(s) SubCutaneous every morning  insulin lispro (ADMELOG) corrective regimen sliding scale   SubCutaneous every 6 hours  levETIRAcetam 1500 milliGRAM(s) Oral two times a day  metoprolol tartrate 25 milliGRAM(s) Oral two times a day  polyethylene glycol 3350 17 Gram(s) Oral daily  senna 2 Tablet(s) Oral at bedtime  silver sulfADIAZINE 1% Cream 1 Application(s) Topical two times a day    MEDICATIONS  (PRN):  acetaminophen     Tablet .. 650 milliGRAM(s) Oral every 6 hours PRN Temp greater or equal to 38C (100.4F), Mild Pain (1 - 3)  albuterol    90 MICROgram(s) HFA Inhaler 2 Puff(s) Inhalation every 6 hours PRN Shortness of Breath and/or Wheezing  aluminum hydroxide/magnesium hydroxide/simethicone Suspension 30 milliLiter(s) Oral every 4 hours PRN Dyspepsia      Diet, NPO with Tube Feed:   Tube Feeding Modality: Gastrostomy  Glucerna 1.2 Pedrito  Total Volume for 24 Hours (mL): 1440  Continuous  Until Goal Tube Feed Rate (mL per Hour): 60  Tube Feed Duration (in Hours): 24  Tube Feed Start Time: 14:30  Free Water Flush  Free Water Flush Instructions:  30 ml/hr via pump  Liquid Protein Supplement     Qty per Day:  1 (01-09-23 @ 13:45) [Active]          Vital Signs Last 24 Hrs  T(C): 36.9 (15 Brando 2023 05:22), Max: 37.3 (14 Jan 2023 16:37)  T(F): 98.5 (15 Brando 2023 05:22), Max: 99.2 (14 Jan 2023 16:37)  HR: 69 (15 Brando 2023 08:34) (63 - 93)  BP: 132/74 (15 Brando 2023 05:22) (108/67 - 135/89)  BP(mean): --  RR: 19 (15 Brando 2023 05:22) (15 - 20)  SpO2: 100% (15 Brando 2023 08:34) (98% - 100%)    Parameters below as of 14 Jan 2023 23:47  Patient On (Oxygen Delivery Method): ventilator            Mode: standby      LABS:                        10.0   8.64  )-----------( 229      ( 14 Jan 2023 07:00 )             31.2     01-14    141  |  104  |  29<H>  ----------------------------<  116<H>  4.0   |  30  |  0.82    Ca    8.7      14 Jan 2023 07:00  Phos  3.7     01-13  Mg     2.3     01-13                WBC:  WBC Count: 8.64 K/uL (01-14 @ 07:00)  WBC Count: 10.43 K/uL (01-13 @ 11:45)  WBC Count: 7.08 K/uL (01-12 @ 18:00)      MICROBIOLOGY:  RECENT CULTURES:  01-13 Trach Asp Tracheal Aspirate XXXX   Moderate polymorphonuclear leukocytes per low power field  Rare Squamous epithelial cells per low power field  Moderate Gram Negative Rods seen per oil power field  Moderate Gram positive cocci in pairs seen per oil power field   Moderate Citrobacter koseri  Normal Respiratory Larissa present    01-13 .Blood Blood XXXX XXXX   No growth to date.                    Sodium:  Sodium, Serum: 141 mmol/L (01-14 @ 07:00)  Sodium, Serum: 145 mmol/L (01-13 @ 11:45)      0.82 mg/dL 01-14 @ 07:00  0.95 mg/dL 01-13 @ 11:45      Hemoglobin:  Hemoglobin: 10.0 g/dL (01-14 @ 07:00)  Hemoglobin: 9.7 g/dL (01-13 @ 11:45)  Hemoglobin: 9.8 g/dL (01-12 @ 18:00)      Platelets: Platelet Count - Automated: 229 K/uL (01-14 @ 07:00)  Platelet Count - Automated: 233 K/uL (01-13 @ 11:45)  Platelet Count - Automated: 226 K/uL (01-12 @ 18:00)              RADIOLOGY & ADDITIONAL STUDIES:      MICROBIOLOGY:  RECENT CULTURES:  01-13 Trach Asp Tracheal Aspirate XXXX   Moderate polymorphonuclear leukocytes per low power field  Rare Squamous epithelial cells per low power field  Moderate Gram Negative Rods seen per oil power field  Moderate Gram positive cocci in pairs seen per oil power field   Moderate Citrobacter koseri  Normal Respiratory Larissa present    01-13 .Blood Blood XXXX XXXX   No growth to date.

## 2023-01-15 NOTE — PROGRESS NOTE ADULT - ASSESSMENT
REVIEW OF SYMPTOMS      Able to give (reliable) ROS  NO     PHYSICAL EXAM    HEENT Unremarkable  atraumatic   RESP Fair air entry EXP prolonged    Harsh breath sound Resp distres mild   CARDIAC S1 S2 No S3     NO JVD    ABDOMEN SOFT BS PRESENT NOT DISTENDED No hepatosplenomegaly   PEDAL EDEMA present No calf tenderness  NO rash       GENERAL DATA .   GOC.  12/11/2022 full code       ALLGY.  nka                            WT. ..  12/2/2022 86  BMI. ..    12/2/2022 29                          ICU STAY.  .. 11/29-12/9  COVID.   .. 12/2/2022 scv2 (-)   .. 11/20/2022 scv2 (-)   BEST PRACTICE ISSUES.    HOB ELEVATN. Yes  DVT PPLX.    .. 1/3/2023 apixa 5.2 (vte)  12/12 l Subclav throm  ALEJO PPLX.   ..      INFN PPLX.   .. 11/25 chlorhex .12%   .. 11/14 chlorhex 2%   SP SW ANTWAN.         DIET.    ..  12/15 glucerna 1.2 1440 gt   IV fl.  ..            PROCEDURES.  .. 12/19 debridement of sacral wound   .. 12/5/2022 trach    ABGS.  1/6/2023 ac 16/450/.3/5 746/49/123     VS/ PO/IO/ VENT/ DRIPS.  1/15/2023 afeb 78 100/60   1/15/2023 tc 98%     PATIENT PRESENTATION.  72 m doa 11/14/2022 cac    PMH  PMH CAD s/p PCI with stent 2015 and CABG 2014,   PMH  s/p medtronic PPM,   PMH CVA (lacunar infarct)    HOSPITAL COURSE   .. 1) PEA arrest with ROSC after approximately 5 min 11/14  Now communicative   .. 2) DVT 12/12 l Subclav thromS 12/15/2022 lvnx 80.2 1/3 changed to apixaban 5.2  .. 3) TRACH 12/5/2022 trach  .. 4) PEG12/5/2022 peg   .. 5) Cellulitis hand absc 12/13 mod enterococcus fecalis  staph epi 12/12-12/15/2022  cephalexin 12/15 vanco once  12/16-12/19 zosyn  .. 6) Vent weaning     PROBLEM ASSESSMENT RECOMMENDATIONS.  WEANING   .. wean as tolerated  .. 1/4/2022  If able to tolerate cpap 5/5 x 2 h will place on tc   .. 1/5/2023 JOSE J Valdovinos Tried on 35% tc   .. 1/6/2023 DW RT Pt to be on monitor during weaning trials and to be placed on vent at night and trach collar during daytime as tolerated   .. 1/7/2023 above reinforced to RT   .. 1/8/2023 placed on trach collar but has lot of secretns lasted 45 min   1/11/2023 tolerated tc several h  1/15/2023 Has been tolerating day time trach collar   TRACH LEAK  .. 1/9/2023 Notified by Hospitalist that pt is losing volunmes and that she is calling surgery to see if trach balloon has leak  HEMOPTYSIS  .. 1/13/2023 Pt having mild hemoptysis last 2 d   .. w 1/13-1/14/2023 w 10.4- 8.6   .. Hb 1/13/2023 Hb 9.7   .. Cr 1/13/2023 Cr .9   .. ct 1/13/2023 ct ch cw 1/4/2020 Stable bectasis and cystic changes basal l lower lobe and lingula Stable ssa r base   .. 1/13/2023 Hemoptysis likely sec to suctioning trauma in setting of full dose ac   .. 1/13/2023 VTE unlikely as cause of ac as pt is on fd ac   .. 1/13/2023 was eval by surgrey no trach bleed as dw Dr Arzate   .. 1/13/2023 infection is possibe cause sergio given bectasis so if persists will consider abio   CHF   .. 11/14/2022 echo mod decr segmental lvsf apical lateral apiccal ant segment are abn takotsubo cmpthy pasp 42 .  .. Monitor for chf has chr hfref per echo   COPD   .. 12/30 symbicort 160   .. 1/7/2023 glycopyrrolate 1.2   .. 1/9/2023 ipratropium  .. 1/15/2023 scopolamine   .. continue bd ics for copd   Anemia.  .. Hb 1/12-1/14/2023 Hb 9.8- 10   .. target hb 7 (+)  .. monitor   sp cac   .. good neuro recovery awake alert interactive   VTE  .. 1/3/2023 apixa 5.2 (vte)      OVERALL   WEAN as told   HEMOPTYSIS Consider abio if persists  TRACH LEAK If persists trach ch        TIME SPENT   Over 25 minutes aggregate care time spent on encounter; activities included   direct patient care, counseling and/or coordinating care reviewing notes, lab data/ imaging , discussion with multidisciplinary team/ patient  /family and explaining in detail risks, benefits, alternatives  of the recommendations     BRANDON CAMARILLO

## 2023-01-16 LAB
GLUCOSE BLDC GLUCOMTR-MCNC: 119 MG/DL — HIGH (ref 70–99)
GLUCOSE BLDC GLUCOMTR-MCNC: 136 MG/DL — HIGH (ref 70–99)
GLUCOSE BLDC GLUCOMTR-MCNC: 143 MG/DL — HIGH (ref 70–99)
GLUCOSE BLDC GLUCOMTR-MCNC: 147 MG/DL — HIGH (ref 70–99)

## 2023-01-16 PROCEDURE — 99232 SBSQ HOSP IP/OBS MODERATE 35: CPT

## 2023-01-16 RX ORDER — CEFTRIAXONE 500 MG/1
1000 INJECTION, POWDER, FOR SOLUTION INTRAMUSCULAR; INTRAVENOUS EVERY 24 HOURS
Refills: 0 | Status: DISCONTINUED | OUTPATIENT
Start: 2023-01-16 | End: 2023-01-20

## 2023-01-16 RX ADMIN — LEVETIRACETAM 1500 MILLIGRAM(S): 250 TABLET, FILM COATED ORAL at 17:35

## 2023-01-16 RX ADMIN — Medication 1 MILLIGRAM(S): at 11:23

## 2023-01-16 RX ADMIN — Medication 1 APPLICATION(S): at 05:51

## 2023-01-16 RX ADMIN — Medication 1 APPLICATION(S): at 06:12

## 2023-01-16 RX ADMIN — CHLORHEXIDINE GLUCONATE 15 MILLILITER(S): 213 SOLUTION TOPICAL at 17:35

## 2023-01-16 RX ADMIN — SCOPALAMINE 1 PATCH: 1 PATCH, EXTENDED RELEASE TRANSDERMAL at 20:00

## 2023-01-16 RX ADMIN — Medication 3 MILLILITER(S): at 17:01

## 2023-01-16 RX ADMIN — APIXABAN 5 MILLIGRAM(S): 2.5 TABLET, FILM COATED ORAL at 17:34

## 2023-01-16 RX ADMIN — Medication 1 APPLICATION(S): at 17:35

## 2023-01-16 RX ADMIN — APIXABAN 5 MILLIGRAM(S): 2.5 TABLET, FILM COATED ORAL at 05:51

## 2023-01-16 RX ADMIN — CHLORHEXIDINE GLUCONATE 1 APPLICATION(S): 213 SOLUTION TOPICAL at 06:13

## 2023-01-16 RX ADMIN — Medication 3 MILLILITER(S): at 05:15

## 2023-01-16 RX ADMIN — BUDESONIDE AND FORMOTEROL FUMARATE DIHYDRATE 2 PUFF(S): 160; 4.5 AEROSOL RESPIRATORY (INHALATION) at 17:02

## 2023-01-16 RX ADMIN — Medication 3 MILLILITER(S): at 00:32

## 2023-01-16 RX ADMIN — INSULIN GLARGINE 20 UNIT(S): 100 INJECTION, SOLUTION SUBCUTANEOUS at 09:00

## 2023-01-16 RX ADMIN — SENNA PLUS 2 TABLET(S): 8.6 TABLET ORAL at 22:18

## 2023-01-16 RX ADMIN — POLYETHYLENE GLYCOL 3350 17 GRAM(S): 17 POWDER, FOR SOLUTION ORAL at 11:23

## 2023-01-16 RX ADMIN — Medication 25 MILLIGRAM(S): at 05:52

## 2023-01-16 RX ADMIN — LEVETIRACETAM 1500 MILLIGRAM(S): 250 TABLET, FILM COATED ORAL at 05:51

## 2023-01-16 RX ADMIN — Medication 3 MILLILITER(S): at 11:23

## 2023-01-16 RX ADMIN — Medication 1 APPLICATION(S): at 17:36

## 2023-01-16 RX ADMIN — Medication 81 MILLIGRAM(S): at 11:23

## 2023-01-16 RX ADMIN — CHLORHEXIDINE GLUCONATE 15 MILLILITER(S): 213 SOLUTION TOPICAL at 05:50

## 2023-01-16 RX ADMIN — ATORVASTATIN CALCIUM 20 MILLIGRAM(S): 80 TABLET, FILM COATED ORAL at 22:17

## 2023-01-16 RX ADMIN — ROBINUL 1 MILLIGRAM(S): 0.2 INJECTION INTRAMUSCULAR; INTRAVENOUS at 17:35

## 2023-01-16 RX ADMIN — PREGABALIN 1000 MICROGRAM(S): 225 CAPSULE ORAL at 17:35

## 2023-01-16 RX ADMIN — BUDESONIDE AND FORMOTEROL FUMARATE DIHYDRATE 2 PUFF(S): 160; 4.5 AEROSOL RESPIRATORY (INHALATION) at 05:15

## 2023-01-16 RX ADMIN — Medication 25 MILLIGRAM(S): at 17:35

## 2023-01-16 RX ADMIN — ROBINUL 1 MILLIGRAM(S): 0.2 INJECTION INTRAMUSCULAR; INTRAVENOUS at 05:51

## 2023-01-16 RX ADMIN — Medication 1 APPLICATION(S): at 17:34

## 2023-01-16 NOTE — PROGRESS NOTE ADULT - ASSESSMENT
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 Unknown how long pt unresponsive for at home. Blood sugar in the 40s with EMS s/p glucagon. On arrival to ER pt hypothermic and agonally breathing. Pt became unresponsive with loss of pulse; ACLS initiated. PEA arrest with ROSC after approximately 5 min s/p Epi x2.  Pt intubated during CODE BLUE. Per son pt on the day prior to presentation reported low blood sugar reads on his glucometer in the range of 80s. Son believes pt continued to take his insulin and oral diabetic regimen. Son states that pt was eating but may have been eating less in the last few days. Later on pt noted to have tonic-clonic Sz.     Problem/Plan-1:  Problem: PEA arrest;s/p Epi x 2. ROSC achieved  CAD/ CHF history:  Takotsubo cardiomyopathy found on Echo   stable  cont current meds. entresto at discharge per cards.     Problem/Plan-2:  Problem: Acute hypoxemic respiratory failure now chronic  s/p intubation , failed extubation, now trach to vent.   c/w vent management, trach collar in daytime   trach starting to bleed mildly today. discussed with surgery PA and will see   cont current bronchodilators. discussed with pulmonologist   1/13 still with some bloody secretions but remains afebrile. Is on AC. Will check ct chest and get sputum cxs. cbc stable. discussed with pulm   1/14 bloody secretion much improved with out intervention. CT chest shows stable bronchiectasis. no new pna. remains afebrile. could be due to suction trauma   1/16 secretion are clear now but remain significant. WIll add scopolamine patch and start abc as sputum is growing citrobacter Doubt he has true pna but may help with secretions       Problem/Plan-3:  Problem: LUE VTE with enterococcal faecalis cellulitis /infected left hand hematoma s/p bedside debridement 12/13   LUE edema and wound of left hand ; good radial pulse   --LUE venous duplex : occlusive thrombus in the left mid subclavian vein with partial   slow flow detected in the lateral subclavian vein.  --LUE arterial Duplex neg   --change lovenox to eliquis 5 mg bid.   --12/13 Hand surgery consult appreciated, s/p bedside debridement.   --antibiotic course completed       Problem/Plan-5:  Problem: New onset Seizure likely due to anoxic brain injury  cont keppra as per colleague discussion with Dr. Loo.       Problem/Plan-6:  Problem: ELMER 2 to ATN  resolved    Problem/Plan-7:  Problem: Shock liver.    due to PEA arrest. resolved    Problem/Plan-8:  Problem:DM2   A1c 8.8%  continue with current management   FS goal 140-180   continue with PEG feeds     Problem/Plan-9:  Problem: Hypophosphatemia --repleted     Problem/Plan-10:  Problem: Ustageable sacral pressure wound   s/p debridement 12/19    Problem/Plan-11:  Problem: Normocytic anemia   no e/o bleeding or bruising   H/H stable   low normal Vit B12 and folic acid --supplement       Moderate PCM and dysphagia: PEG tube feeding.     Stage 3 sacral ulcer: Clean, continue wound care    Preventative measures   dvt ppx>>>on DOAC  fall, aspiration  precautions  Dipso: Patient has no insurance and Family in process of getting guardianship. Patient will then need placement.

## 2023-01-16 NOTE — PROGRESS NOTE ADULT - ASSESSMENT
REVIEW OF SYMPTOMS      Able to give (reliable) ROS  NO     PHYSICAL EXAM    HEENT Unremarkable  atraumatic   RESP Fair air entry EXP prolonged    Harsh breath sound Resp distres mild   CARDIAC S1 S2 No S3     NO JVD    ABDOMEN SOFT BS PRESENT NOT DISTENDED No hepatosplenomegaly   PEDAL EDEMA present No calf tenderness  NO rash       GENERAL DATA .   GOC.  12/11/2022 full code       ALLGY.  nka                            WT. ..  12/2/2022 86  BMI. ..    12/2/2022 29                          ICU STAY.  .. 11/29-12/9  COVID.   .. 12/2/2022 scv2 (-)   .. 11/20/2022 scv2 (-)   BEST PRACTICE ISSUES.    HOB ELEVATN. Yes  DVT PPLX.    .. 1/3/2023 apixa 5.2 (vte)  12/12 l Subclav throm  ALEJO PPLX.   ..      INFN PPLX.   .. 11/25 chlorhex .12%   .. 11/14 chlorhex 2%   SP SW ANTWAN.         DIET.    ..  12/15 glucerna 1.2 1440 gt   IV fl.  ..            PROCEDURES.  .. 12/19 debridement of sacral wound   .. 12/5/2022 trach    ABGS.  1/6/2023 ac 16/450/.3/5 746/49/123     VS/ PO/IO/ VENT/ DRIPS.  1/16/2023 afeb 70 110/60   1/16/2023 tc 35% po 97%     PATIENT PRESENTATION.  72 m doa 11/14/2022 cac    PMH  PMH CAD s/p PCI with stent 2015 and CABG 2014,   PMH  s/p medtronic PPM,   PMH CVA (lacunar infarct)    HOSPITAL COURSE   .. 1) PEA arrest with ROSC after approximately 5 min 11/14  Now communicative   .. 2) DVT 12/12 l Subclav thromS 12/15/2022 lvnx 80.2 1/3 changed to apixaban 5.2  .. 3) TRACH 12/5/2022 trach  .. 4) PEG12/5/2022 peg   .. 5) Cellulitis hand absc 12/13 mod enterococcus fecalis  staph epi 12/12-12/15/2022  cephalexin 12/15 vanco once  12/16-12/19 zosyn  .. 6) Vent weaning     PROBLEM ASSESSMENT RECOMMENDATIONS.  WEANING   .. wean as tolerated  .. 1/4/2022  If able to tolerate cpap 5/5 x 2 h will place on tc   .. 1/5/2023 JOSE J Valdovinos Tried on 35% tc   .. 1/6/2023 DW RT Pt to be on monitor during weaning trials and to be placed on vent at night and trach collar during daytime as tolerated   .. 1/7/2023 above reinforced to RT   .. 1/8/2023 placed on trach collar but has lot of secretns lasted 45 min   1/11/2023 tolerated tc several h  1/15/2023 Has been tolerating day time trach collar   TRACH LEAK  .. 1/9/2023 Notified by Hospitalist that pt is losing volunmes and that she is calling surgery to see if trach balloon has leak  HEMOPTYSIS  .. 1/13/2023 Pt having mild hemoptysis last 2 d   .. w 1/13-1/14/2023 w 10.4- 8.6   .. Hb 1/13/2023 Hb 9.7   .. Cr 1/13/2023 Cr .9   .. ct 1/13/2023 ct ch cw 1/4/2020 Stable bectasis and cystic changes basal l lower lobe and lingula Stable ssa r base   .. 1/13/2023 Hemoptysis likely sec to suctioning trauma in setting of full dose ac   .. 1/13/2023 VTE unlikely as cause of ac as pt is on fd ac   .. 1/13/2023 was eval by surgrey no trach bleed as dw Dr Arzate   .. 1/13/2023 infection is possibe cause sergio given bectasis so if persists will consider abio   INFECTION.  .. 1/16/2023 Rocephin started by hospitalist  CHF   .. 11/14/2022 echo mod decr segmental lvsf apical lateral apiccal ant segment are abn takotsubo cmpthy pasp 42 .  .. Monitor for chf has chr hfref per echo   COPD   .. 12/30 symbicort 160   .. 1/7/2023 glycopyrrolate 1.2   .. 1/9/2023 ipratropium  .. 1/15/2023 scopolamine   .. continue bd ics for copd   Anemia.  .. Hb 1/12-1/14/2023 Hb 9.8- 10   .. target hb 7 (+)  .. monitor   sp cac   .. good neuro recovery awake alert interactive   VTE  .. 1/3/2023 apixa 5.2 (vte)      OVERALL   WEAN as told   HEMOPTYSIS Consider abio if persists  TRACH LEAK If persists trach ch  ROCEPHIN Started 1/16/2023 by hospitalist      TIME SPENT   Over 25 minutes aggregate care time spent on encounter; activities included   direct patient care, counseling and/or coordinating care reviewing notes, lab data/ imaging , discussion with multidisciplinary team/ patient  /family and explaining in detail risks, benefits, alternatives  of the recommendations     BRANDON CAMARILLO

## 2023-01-16 NOTE — PROGRESS NOTE ADULT - SUBJECTIVE AND OBJECTIVE BOX
Patient is a 72y old  Male who presents with a chief complaint of s/p cardiac arrest, hypoglycemia (10 Brando 2023 10:35)      INTERVAL HPI/OVERNIGHT EVENTS:  Pt was seen and examined, no acute events.        MEDICATIONS  (STANDING):  albuterol/ipratropium for Nebulization 3 milliLiter(s) Nebulizer every 6 hours  apixaban 5 milliGRAM(s) Oral every 12 hours  aspirin  chewable 81 milliGRAM(s) Oral daily  atorvastatin 20 milliGRAM(s) Oral at bedtime  bacitracin   Ointment 1 Application(s) Topical two times a day  budesonide 160 MICROgram(s)/formoterol 4.5 MICROgram(s) Inhaler 2 Puff(s) Inhalation two times a day  chlorhexidine 0.12% Liquid 15 milliLiter(s) Oral Mucosa every 12 hours  chlorhexidine 2% Cloths 1 Application(s) Topical <User Schedule>  collagenase Ointment 1 Application(s) Topical two times a day  cyanocobalamin 1000 MICROGram(s) Oral daily  diphenhydrAMINE Injectable 50 milliGRAM(s) IV Push once  folic acid 1 milliGRAM(s) Oral daily  glycopyrrolate 1 milliGRAM(s) Oral two times a day  insulin glargine Injectable (LANTUS) 20 Unit(s) SubCutaneous every morning  insulin lispro (ADMELOG) corrective regimen sliding scale   SubCutaneous every 6 hours  levETIRAcetam 1500 milliGRAM(s) Oral two times a day  metoprolol tartrate 25 milliGRAM(s) Oral two times a day  polyethylene glycol 3350 17 Gram(s) Oral daily  senna 2 Tablet(s) Oral at bedtime  silver sulfADIAZINE 1% Cream 1 Application(s) Topical two times a day    MEDICATIONS  (PRN):  acetaminophen     Tablet .. 650 milliGRAM(s) Oral every 6 hours PRN Temp greater or equal to 38C (100.4F), Mild Pain (1 - 3)  albuterol    90 MICROgram(s) HFA Inhaler 2 Puff(s) Inhalation every 6 hours PRN Shortness of Breath and/or Wheezing  aluminum hydroxide/magnesium hydroxide/simethicone Suspension 30 milliLiter(s) Oral every 4 hours PRN Dyspepsia        Allergies  No Known Allergies    Vital Signs Last 24 Hrs  T(C): 36.6 (16 Jan 2023 10:59), Max: 37.3 (16 Jan 2023 00:12)  T(F): 97.8 (16 Jan 2023 10:59), Max: 99.1 (16 Jan 2023 00:12)  HR: 72 (16 Jan 2023 13:11) (64 - 74)  BP: 98/60 (16 Jan 2023 10:59) (98/60 - 113/66)  BP(mean): --  RR: 20 (16 Jan 2023 13:11) (18 - 20)  SpO2: 97% (16 Jan 2023 13:11) (97% - 100%)    Parameters below as of 16 Jan 2023 13:11  Patient On (Oxygen Delivery Method): tracheostomy collar  O2 Flow (L/min): 6  O2 Concentration (%): 35          PHYSICAL EXAM:  GENERAL: Obese, NAD  CHEST/LUNG: Decreased air entry bibasally. no wheezing. s/p trach , on trach collar today  HEART: Regular rate and rhythm; + murmur  ABDOMEN: Soft, Nontender, Nondistended; Bowel sounds present.s/p PEG  EXTREMITIES:  Moving all four extremities spontaneously, No clubbing, cyanosis, or edema.   NERVOUS SYSTEM:  Grossly non focal. followed simple commands. communicating with writing   Psychiatry: AAO x 3, mood is appropriate         LABS:                                                                                RADIOLOGY & ADDITIONAL TESTS:    Imaging Personally Reviewed:  [ ] YES  [ ] NO    Consultant(s) Notes Reviewed:  [ ] YES  [ ] NO    Care Discussed with Consultants/Other Providers [ ] YES  [ ] NO

## 2023-01-16 NOTE — PROGRESS NOTE ADULT - SUBJECTIVE AND OBJECTIVE BOX
BRANDON CAMARILLO    [address] [address]    Allergies    No Known Allergies    Intolerances        PAST MEDICAL & SURGICAL HISTORY:  HTN (hypertension)      HLD (hyperlipidemia)      Diabetes mellitus      Lacunar infarction      BPH (benign prostatic hyperplasia)      CHF (congestive heart failure)      Chronic obstructive pulmonary disease, unspecified COPD type      S/P CABG (coronary artery bypass graft)  2014          FAMILY HISTORY:      Home Medications:  aspirin 81 mg oral delayed release tablet: 1 tab(s) orally once a day (12 Jun 2020 17:35)  Liquifilm Tears preserved ophthalmic solution: 1 drop(s) to each affected eye 2 times a day (12 Jun 2020 17:35)      MEDICATIONS  (STANDING):  albuterol/ipratropium for Nebulization 3 milliLiter(s) Nebulizer every 6 hours  apixaban 5 milliGRAM(s) Oral every 12 hours  aspirin  chewable 81 milliGRAM(s) Oral daily  atorvastatin 20 milliGRAM(s) Oral at bedtime  bacitracin   Ointment 1 Application(s) Topical two times a day  budesonide 160 MICROgram(s)/formoterol 4.5 MICROgram(s) Inhaler 2 Puff(s) Inhalation two times a day  cefTRIAXone   IVPB 1000 milliGRAM(s) IV Intermittent every 24 hours  chlorhexidine 0.12% Liquid 15 milliLiter(s) Oral Mucosa every 12 hours  chlorhexidine 2% Cloths 1 Application(s) Topical <User Schedule>  collagenase Ointment 1 Application(s) Topical two times a day  cyanocobalamin 1000 MICROGram(s) Oral daily  diphenhydrAMINE Injectable 50 milliGRAM(s) IV Push once  folic acid 1 milliGRAM(s) Oral daily  glycopyrrolate 1 milliGRAM(s) Oral two times a day  insulin glargine Injectable (LANTUS) 20 Unit(s) SubCutaneous every morning  insulin lispro (ADMELOG) corrective regimen sliding scale   SubCutaneous every 6 hours  levETIRAcetam 1500 milliGRAM(s) Oral two times a day  metoprolol tartrate 25 milliGRAM(s) Oral two times a day  polyethylene glycol 3350 17 Gram(s) Oral daily  scopolamine 1 mG/72 Hr(s) Patch 1 Patch Transdermal every 72 hours  senna 2 Tablet(s) Oral at bedtime  silver sulfADIAZINE 1% Cream 1 Application(s) Topical two times a day    MEDICATIONS  (PRN):  acetaminophen     Tablet .. 650 milliGRAM(s) Oral every 6 hours PRN Temp greater or equal to 38C (100.4F), Mild Pain (1 - 3)  albuterol    90 MICROgram(s) HFA Inhaler 2 Puff(s) Inhalation every 6 hours PRN Shortness of Breath and/or Wheezing  aluminum hydroxide/magnesium hydroxide/simethicone Suspension 30 milliLiter(s) Oral every 4 hours PRN Dyspepsia  oxycodone    5 mG/acetaminophen 325 mG 1 Tablet(s) Oral every 4 hours PRN Moderate Pain (4 - 6)      Diet, NPO with Tube Feed:   Tube Feeding Modality: Gastrostomy  Glucerna 1.2 Pedrito  Total Volume for 24 Hours (mL): 1440  Continuous  Until Goal Tube Feed Rate (mL per Hour): 60  Tube Feed Duration (in Hours): 24  Tube Feed Start Time: 14:30  Free Water Flush  Free Water Flush Instructions:  30 ml/hr via pump  Liquid Protein Supplement     Qty per Day:  1 (01-09-23 @ 13:45) [Active]          Vital Signs Last 24 Hrs  T(C): 36.6 (16 Jan 2023 10:59), Max: 37.3 (16 Jan 2023 00:12)  T(F): 97.8 (16 Jan 2023 10:59), Max: 99.1 (16 Jan 2023 00:12)  HR: 72 (16 Jan 2023 13:11) (64 - 74)  BP: 98/60 (16 Jan 2023 10:59) (98/60 - 113/66)  BP(mean): --  RR: 20 (16 Jan 2023 13:11) (18 - 20)  SpO2: 97% (16 Jan 2023 13:11) (97% - 100%)    Parameters below as of 16 Jan 2023 13:11  Patient On (Oxygen Delivery Method): tracheostomy collar  O2 Flow (L/min): 6  O2 Concentration (%): 35      01-15-23 @ 07:01  -  01-16-23 @ 07:00  --------------------------------------------------------  IN: 900 mL / OUT: 1100 mL / NET: -200 mL        Mode: standby      LABS:                    WBC:  WBC Count: 8.64 K/uL (01-14 @ 07:00)  WBC Count: 10.43 K/uL (01-13 @ 11:45)  WBC Count: 7.08 K/uL (01-12 @ 18:00)      MICROBIOLOGY:  RECENT CULTURES:  01-13 Trach Asp Tracheal Aspirate Citrobacter koseri   Moderate polymorphonuclear leukocytes per low power field  Rare Squamous epithelial cells per low power field  Moderate Gram Negative Rods seen per oil power field  Moderate Gram positive cocci in pairs seen per oil power field   Moderate Citrobacter koseri  Normal Respiratory Larissa present    01-13 .Blood Blood XXXX XXXX   No growth to date.                    Sodium:  Sodium, Serum: 141 mmol/L (01-14 @ 07:00)  Sodium, Serum: 145 mmol/L (01-13 @ 11:45)      0.82 mg/dL 01-14 @ 07:00  0.95 mg/dL 01-13 @ 11:45      Hemoglobin:  Hemoglobin: 10.0 g/dL (01-14 @ 07:00)  Hemoglobin: 9.7 g/dL (01-13 @ 11:45)  Hemoglobin: 9.8 g/dL (01-12 @ 18:00)      Platelets: Platelet Count - Automated: 229 K/uL (01-14 @ 07:00)  Platelet Count - Automated: 233 K/uL (01-13 @ 11:45)  Platelet Count - Automated: 226 K/uL (01-12 @ 18:00)              RADIOLOGY & ADDITIONAL STUDIES:      MICROBIOLOGY:  RECENT CULTURES:  01-13 Trach Asp Tracheal Aspirate Citrobacter koseri   Moderate polymorphonuclear leukocytes per low power field  Rare Squamous epithelial cells per low power field  Moderate Gram Negative Rods seen per oil power field  Moderate Gram positive cocci in pairs seen per oil power field   Moderate Citrobacter koseri  Normal Respiratory Larissa present    01-13 .Blood Blood XXXX XXXX   No growth to date.

## 2023-01-17 LAB
ANION GAP SERPL CALC-SCNC: 8 MMOL/L — SIGNIFICANT CHANGE UP (ref 5–17)
BUN SERPL-MCNC: 33 MG/DL — HIGH (ref 7–23)
CALCIUM SERPL-MCNC: 8.9 MG/DL — SIGNIFICANT CHANGE UP (ref 8.5–10.1)
CHLORIDE SERPL-SCNC: 107 MMOL/L — SIGNIFICANT CHANGE UP (ref 96–108)
CO2 SERPL-SCNC: 29 MMOL/L — SIGNIFICANT CHANGE UP (ref 22–31)
CREAT SERPL-MCNC: 1.12 MG/DL — SIGNIFICANT CHANGE UP (ref 0.5–1.3)
EGFR: 69 ML/MIN/1.73M2 — SIGNIFICANT CHANGE UP
GLUCOSE BLDC GLUCOMTR-MCNC: 100 MG/DL — HIGH (ref 70–99)
GLUCOSE BLDC GLUCOMTR-MCNC: 109 MG/DL — HIGH (ref 70–99)
GLUCOSE BLDC GLUCOMTR-MCNC: 155 MG/DL — HIGH (ref 70–99)
GLUCOSE BLDC GLUCOMTR-MCNC: 182 MG/DL — HIGH (ref 70–99)
GLUCOSE BLDC GLUCOMTR-MCNC: 90 MG/DL — SIGNIFICANT CHANGE UP (ref 70–99)
GLUCOSE SERPL-MCNC: 149 MG/DL — HIGH (ref 70–99)
HCT VFR BLD CALC: 31.9 % — LOW (ref 39–50)
HGB BLD-MCNC: 10 G/DL — LOW (ref 13–17)
MCHC RBC-ENTMCNC: 30.5 PG — SIGNIFICANT CHANGE UP (ref 27–34)
MCHC RBC-ENTMCNC: 31.3 G/DL — LOW (ref 32–36)
MCV RBC AUTO: 97.3 FL — SIGNIFICANT CHANGE UP (ref 80–100)
NRBC # BLD: 0 /100 WBCS — SIGNIFICANT CHANGE UP (ref 0–0)
PLATELET # BLD AUTO: 265 K/UL — SIGNIFICANT CHANGE UP (ref 150–400)
POTASSIUM SERPL-MCNC: 4.1 MMOL/L — SIGNIFICANT CHANGE UP (ref 3.5–5.3)
POTASSIUM SERPL-SCNC: 4.1 MMOL/L — SIGNIFICANT CHANGE UP (ref 3.5–5.3)
PROCALCITONIN SERPL-MCNC: 0.06 NG/ML — SIGNIFICANT CHANGE UP (ref 0.02–0.1)
RBC # BLD: 3.28 M/UL — LOW (ref 4.2–5.8)
RBC # FLD: 14.4 % — SIGNIFICANT CHANGE UP (ref 10.3–14.5)
SODIUM SERPL-SCNC: 144 MMOL/L — SIGNIFICANT CHANGE UP (ref 135–145)
WBC # BLD: 8.38 K/UL — SIGNIFICANT CHANGE UP (ref 3.8–10.5)
WBC # FLD AUTO: 8.38 K/UL — SIGNIFICANT CHANGE UP (ref 3.8–10.5)

## 2023-01-17 PROCEDURE — 99232 SBSQ HOSP IP/OBS MODERATE 35: CPT

## 2023-01-17 RX ORDER — DIVALPROEX SODIUM 500 MG/1
250 TABLET, DELAYED RELEASE ORAL
Refills: 0 | Status: DISCONTINUED | OUTPATIENT
Start: 2023-01-17 | End: 2023-01-17

## 2023-01-17 RX ORDER — VALPROIC ACID (AS SODIUM SALT) 250 MG/5ML
750 SOLUTION, ORAL ORAL ONCE
Refills: 0 | Status: COMPLETED | OUTPATIENT
Start: 2023-01-17 | End: 2023-01-17

## 2023-01-17 RX ORDER — VALPROIC ACID (AS SODIUM SALT) 250 MG/5ML
250 SOLUTION, ORAL ORAL
Refills: 0 | Status: DISCONTINUED | OUTPATIENT
Start: 2023-01-17 | End: 2023-01-18

## 2023-01-17 RX ORDER — LACTULOSE 10 G/15ML
20 SOLUTION ORAL ONCE
Refills: 0 | Status: COMPLETED | OUTPATIENT
Start: 2023-01-17 | End: 2023-01-17

## 2023-01-17 RX ADMIN — LEVETIRACETAM 1500 MILLIGRAM(S): 250 TABLET, FILM COATED ORAL at 06:40

## 2023-01-17 RX ADMIN — Medication 1 APPLICATION(S): at 17:25

## 2023-01-17 RX ADMIN — PREGABALIN 1000 MICROGRAM(S): 225 CAPSULE ORAL at 11:43

## 2023-01-17 RX ADMIN — SENNA PLUS 2 TABLET(S): 8.6 TABLET ORAL at 22:11

## 2023-01-17 RX ADMIN — POLYETHYLENE GLYCOL 3350 17 GRAM(S): 17 POWDER, FOR SOLUTION ORAL at 11:42

## 2023-01-17 RX ADMIN — ATORVASTATIN CALCIUM 20 MILLIGRAM(S): 80 TABLET, FILM COATED ORAL at 22:11

## 2023-01-17 RX ADMIN — ROBINUL 1 MILLIGRAM(S): 0.2 INJECTION INTRAMUSCULAR; INTRAVENOUS at 17:25

## 2023-01-17 RX ADMIN — Medication 3 MILLILITER(S): at 18:13

## 2023-01-17 RX ADMIN — Medication 650 MILLIGRAM(S): at 22:12

## 2023-01-17 RX ADMIN — BUDESONIDE AND FORMOTEROL FUMARATE DIHYDRATE 2 PUFF(S): 160; 4.5 AEROSOL RESPIRATORY (INHALATION) at 05:30

## 2023-01-17 RX ADMIN — LACTULOSE 20 GRAM(S): 10 SOLUTION ORAL at 14:00

## 2023-01-17 RX ADMIN — Medication 2: at 06:53

## 2023-01-17 RX ADMIN — Medication 2: at 17:24

## 2023-01-17 RX ADMIN — SCOPALAMINE 1 PATCH: 1 PATCH, EXTENDED RELEASE TRANSDERMAL at 19:50

## 2023-01-17 RX ADMIN — Medication 650 MILLIGRAM(S): at 10:24

## 2023-01-17 RX ADMIN — Medication 3 MILLILITER(S): at 05:30

## 2023-01-17 RX ADMIN — Medication 57.5 MILLIGRAM(S): at 14:05

## 2023-01-17 RX ADMIN — CHLORHEXIDINE GLUCONATE 1 APPLICATION(S): 213 SOLUTION TOPICAL at 06:49

## 2023-01-17 RX ADMIN — BUDESONIDE AND FORMOTEROL FUMARATE DIHYDRATE 2 PUFF(S): 160; 4.5 AEROSOL RESPIRATORY (INHALATION) at 18:12

## 2023-01-17 RX ADMIN — Medication 3 MILLILITER(S): at 12:19

## 2023-01-17 RX ADMIN — Medication 1 APPLICATION(S): at 06:47

## 2023-01-17 RX ADMIN — Medication 1 APPLICATION(S): at 17:24

## 2023-01-17 RX ADMIN — Medication 81 MILLIGRAM(S): at 11:43

## 2023-01-17 RX ADMIN — APIXABAN 5 MILLIGRAM(S): 2.5 TABLET, FILM COATED ORAL at 17:23

## 2023-01-17 RX ADMIN — LEVETIRACETAM 1500 MILLIGRAM(S): 250 TABLET, FILM COATED ORAL at 17:22

## 2023-01-17 RX ADMIN — Medication 650 MILLIGRAM(S): at 23:23

## 2023-01-17 RX ADMIN — CHLORHEXIDINE GLUCONATE 15 MILLILITER(S): 213 SOLUTION TOPICAL at 06:42

## 2023-01-17 RX ADMIN — Medication 25 MILLIGRAM(S): at 17:22

## 2023-01-17 RX ADMIN — Medication 250 MILLIGRAM(S): at 22:11

## 2023-01-17 RX ADMIN — Medication 1 APPLICATION(S): at 06:42

## 2023-01-17 RX ADMIN — APIXABAN 5 MILLIGRAM(S): 2.5 TABLET, FILM COATED ORAL at 06:39

## 2023-01-17 RX ADMIN — CHLORHEXIDINE GLUCONATE 15 MILLILITER(S): 213 SOLUTION TOPICAL at 17:22

## 2023-01-17 RX ADMIN — CEFTRIAXONE 100 MILLIGRAM(S): 500 INJECTION, POWDER, FOR SOLUTION INTRAMUSCULAR; INTRAVENOUS at 10:30

## 2023-01-17 RX ADMIN — Medication 3 MILLILITER(S): at 00:29

## 2023-01-17 RX ADMIN — Medication 1 MILLIGRAM(S): at 11:42

## 2023-01-17 RX ADMIN — ROBINUL 1 MILLIGRAM(S): 0.2 INJECTION INTRAMUSCULAR; INTRAVENOUS at 06:42

## 2023-01-17 NOTE — PROGRESS NOTE ADULT - SUBJECTIVE AND OBJECTIVE BOX
BRANDON CAMARILLO    [address] [address]    Allergies    No Known Allergies    Intolerances        PAST MEDICAL & SURGICAL HISTORY:  HTN (hypertension)      HLD (hyperlipidemia)      Diabetes mellitus      Lacunar infarction      BPH (benign prostatic hyperplasia)      CHF (congestive heart failure)      Chronic obstructive pulmonary disease, unspecified COPD type      S/P CABG (coronary artery bypass graft)  2014          FAMILY HISTORY:      Home Medications:  aspirin 81 mg oral delayed release tablet: 1 tab(s) orally once a day (12 Jun 2020 17:35)  Liquifilm Tears preserved ophthalmic solution: 1 drop(s) to each affected eye 2 times a day (12 Jun 2020 17:35)      MEDICATIONS  (STANDING):  albuterol/ipratropium for Nebulization 3 milliLiter(s) Nebulizer every 6 hours  apixaban 5 milliGRAM(s) Oral every 12 hours  aspirin  chewable 81 milliGRAM(s) Oral daily  atorvastatin 20 milliGRAM(s) Oral at bedtime  bacitracin   Ointment 1 Application(s) Topical two times a day  budesonide 160 MICROgram(s)/formoterol 4.5 MICROgram(s) Inhaler 2 Puff(s) Inhalation two times a day  cefTRIAXone   IVPB 1000 milliGRAM(s) IV Intermittent every 24 hours  chlorhexidine 0.12% Liquid 15 milliLiter(s) Oral Mucosa every 12 hours  chlorhexidine 2% Cloths 1 Application(s) Topical <User Schedule>  collagenase Ointment 1 Application(s) Topical two times a day  cyanocobalamin 1000 MICROGram(s) Oral daily  diphenhydrAMINE Injectable 50 milliGRAM(s) IV Push once  folic acid 1 milliGRAM(s) Oral daily  glycopyrrolate 1 milliGRAM(s) Oral two times a day  insulin glargine Injectable (LANTUS) 20 Unit(s) SubCutaneous every morning  insulin lispro (ADMELOG) corrective regimen sliding scale   SubCutaneous every 6 hours  levETIRAcetam 1500 milliGRAM(s) Oral two times a day  metoprolol tartrate 25 milliGRAM(s) Oral two times a day  polyethylene glycol 3350 17 Gram(s) Oral daily  scopolamine 1 mG/72 Hr(s) Patch 1 Patch Transdermal every 72 hours  senna 2 Tablet(s) Oral at bedtime  silver sulfADIAZINE 1% Cream 1 Application(s) Topical two times a day    MEDICATIONS  (PRN):  acetaminophen     Tablet .. 650 milliGRAM(s) Oral every 6 hours PRN Temp greater or equal to 38C (100.4F), Mild Pain (1 - 3)  albuterol    90 MICROgram(s) HFA Inhaler 2 Puff(s) Inhalation every 6 hours PRN Shortness of Breath and/or Wheezing  aluminum hydroxide/magnesium hydroxide/simethicone Suspension 30 milliLiter(s) Oral every 4 hours PRN Dyspepsia  oxycodone    5 mG/acetaminophen 325 mG 1 Tablet(s) Oral every 4 hours PRN Moderate Pain (4 - 6)      Diet, NPO with Tube Feed:   Tube Feeding Modality: Gastrostomy  Glucerna 1.2 Pedrito  Total Volume for 24 Hours (mL): 1440  Continuous  Until Goal Tube Feed Rate (mL per Hour): 60  Tube Feed Duration (in Hours): 24  Tube Feed Start Time: 14:30  Free Water Flush  Free Water Flush Instructions:  30 ml/hr via pump  Liquid Protein Supplement     Qty per Day:  1 (01-09-23 @ 13:45) [Active]          Vital Signs Last 24 Hrs  T(C): 36.6 (17 Jan 2023 04:36), Max: 37.2 (16 Jan 2023 23:23)  T(F): 97.8 (17 Jan 2023 04:36), Max: 98.9 (16 Jan 2023 23:23)  HR: 71 (17 Jan 2023 05:46) (68 - 86)  BP: 110/67 (17 Jan 2023 04:36) (98/60 - 111/68)  BP(mean): --  RR: 18 (17 Jan 2023 04:36) (18 - 20)  SpO2: 100% (17 Jan 2023 05:46) (97% - 100%)    Parameters below as of 17 Jan 2023 01:32  Patient On (Oxygen Delivery Method): ventilator            Mode: AC/ CMV (Assist Control/ Continuous Mandatory Ventilation), RR (machine): 14, TV (machine): 450, FiO2: 35, PEEP: 5, ITime: 1, MAP: 11, PIP: 22      LABS:                        10.0   8.38  )-----------( 265      ( 17 Jan 2023 06:35 )             31.9     01-17    144  |  107  |  33<H>  ----------------------------<  149<H>  4.1   |  29  |  1.12    Ca    8.9      17 Jan 2023 06:35                WBC:  WBC Count: 8.38 K/uL (01-17 @ 06:35)  WBC Count: 8.64 K/uL (01-14 @ 07:00)  WBC Count: 10.43 K/uL (01-13 @ 11:45)      MICROBIOLOGY:  RECENT CULTURES:  01-13 Trach Asp Tracheal Aspirate Citrobacter koseri   Moderate polymorphonuclear leukocytes per low power field  Rare Squamous epithelial cells per low power field  Moderate Gram Negative Rods seen per oil power field  Moderate Gram positive cocci in pairs seen per oil power field   Moderate Citrobacter koseri  Normal Respiratory Larissa present    01-13 .Blood Blood XXXX XXXX   No growth to date.                    Sodium:  Sodium, Serum: 144 mmol/L (01-17 @ 06:35)  Sodium, Serum: 141 mmol/L (01-14 @ 07:00)  Sodium, Serum: 145 mmol/L (01-13 @ 11:45)      1.12 mg/dL 01-17 @ 06:35  0.82 mg/dL 01-14 @ 07:00  0.95 mg/dL 01-13 @ 11:45      Hemoglobin:  Hemoglobin: 10.0 g/dL (01-17 @ 06:35)  Hemoglobin: 10.0 g/dL (01-14 @ 07:00)  Hemoglobin: 9.7 g/dL (01-13 @ 11:45)      Platelets: Platelet Count - Automated: 265 K/uL (01-17 @ 06:35)  Platelet Count - Automated: 229 K/uL (01-14 @ 07:00)  Platelet Count - Automated: 233 K/uL (01-13 @ 11:45)              RADIOLOGY & ADDITIONAL STUDIES:      MICROBIOLOGY:  RECENT CULTURES:  01-13 Trach Asp Tracheal Aspirate Citrobacter koseri   Moderate polymorphonuclear leukocytes per low power field  Rare Squamous epithelial cells per low power field  Moderate Gram Negative Rods seen per oil power field  Moderate Gram positive cocci in pairs seen per oil power field   Moderate Citrobacter koseri  Normal Respiratory Larissa present    01-13 .Blood Blood XXXX XXXX   No growth to date.

## 2023-01-17 NOTE — PROGRESS NOTE ADULT - ASSESSMENT
Criteria met for discharge, IV removed, instructions explained,copy given. Denies pain, tolerating po well, voiding without difficulty. Pt and family all verbalize understanding of instructions, have no further questions. Discharged home with family in good condition.     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 Unknown how long pt unresponsive for at home. Blood sugar in the 40s with EMS s/p glucagon. On arrival to ER pt hypothermic and agonally breathing. Pt became unresponsive with loss of pulse; ACLS initiated. PEA arrest with ROSC after approximately 5 min s/p Epi x2.  Pt intubated during CODE BLUE. Per son pt on the day prior to presentation reported low blood sugar reads on his glucometer in the range of 80s. Son believes pt continued to take his insulin and oral diabetic regimen. Son states that pt was eating but may have been eating less in the last few days. Later on pt noted to have tonic-clonic Sz.     Problem/Plan-1:  Problem: PEA arrest;s/p Epi x 2. ROSC achieved  CAD/ CHF history:  Takotsubo cardiomyopathy found on Echo   stable  cont current meds. start entresto in a few days     Problem/Plan-2:  Problem: Acute hypoxemic respiratory failure now chronic  s/p intubation , failed extubation, now trach to vent.   c/w vent management, trach collar in daytime   trach starting to bleed mildly today. discussed with surgery PA and will see   cont current bronchodilators. discussed with pulmonologist   1/13 still with some bloody secretions but remains afebrile. Is on AC. Will check ct chest and get sputum cxs. cbc stable. discussed with pulm   1/14 bloody secretion much improved with out intervention. CT chest shows stable bronchiectasis. no new pna. remains afebrile. could be due to suction trauma   1/16 secretion are clear now but remain significant. WIll add scopolamine patch and start abc as sputum is growing citrobacter Doubt he has true pna but may help with secretions   1/17 secretions improved but still significant      Problem/Plan-3:  Problem: LUE VTE with enterococcal faecalis cellulitis /infected left hand hematoma s/p bedside debridement 12/13   LUE edema and wound of left hand ; good radial pulse   --LUE venous duplex : occlusive thrombus in the left mid subclavian vein with partial   slow flow detected in the lateral subclavian vein.  --LUE arterial Duplex neg   --change lovenox to eliquis 5 mg bid.   --12/13 Hand surgery consult appreciated, s/p bedside debridement.   --antibiotic course completed       Problem/Plan-5:  Problem: New onset Seizure likely due to anoxic brain injury  had new flailing of the legs today which are concerning fro myoclonus vs seizure   cont keppra and load with depakote    Dr. Loo. aware and ordered VEEG       Problem/Plan-6:  distended abomen   - give lactulose and enema   - ++ flatus and BM     Problem/Plan-7:  Problem: Shock liver.    due to PEA arrest. resolved    Problem/Plan-8:  Problem:DM2   A1c 8.8%  continue with current management   FS goal 140-180   continue with PEG feeds     Problem/Plan-9:  Problem: Hypophosphatemia --repleted     Problem/Plan-10:  Problem: Ustageable sacral pressure wound   s/p debridement 12/19    Problem/Plan-11:  Problem: Normocytic anemia   no e/o bleeding or bruising   H/H stable   low normal Vit B12 and folic acid --supplement       Moderate PCM and dysphagia: PEG tube feeding.     Stage 3 sacral ulcer: Clean, continue wound care    Preventative measures   dvt ppx>>>on DOAC  fall, aspiration  precautions  Dipso: Patient has no insurance and Family in process of getting guardianship. Patient will then need placement.

## 2023-01-17 NOTE — CHART NOTE - NSCHARTNOTEFT_GEN_A_CORE
Assessment:  Pt adm c dx of hypoglycemia, cardiac arrest, hypothermia, c acute respiratory failure (now chronic), new onset seizures, s/p ELMER, shock liver, E. Coli, UTI, Pt s/p trach, PEG, on vent c trach collar trials during the day. Pt also c LUE VTE c cellulitis, infected left hand hematoma, s/p debridement for sacral ulcer (12/13).  Family in precess of obtaining guardianship; d/c plan for placement noted.    PMH include COPD, CHF, HTN, HLD, CAD, CABG, CKD, CVA, BPH. DM( was on Jardiance, Metformin, Victoza pen, Insulin Lispro 10 units, 2 x day, and Insulin Lantus 20 units per medication list provided by son on 11/16),      Factors impacting intake: [ ] none [ ] nausea  [ ] vomiting [ ] diarrhea [ ] constipation  [ ]chewing problems [ ] swallowing issues  [X ] other: inability to self-feed    Diet Prescription: Diet, NPO with Tube Feed:   Tube Feeding Modality: Gastrostomy  Glucerna 1.2 Pedrito  Total Volume for 24 Hours (mL): 1440  Continuous  Until Goal Tube Feed Rate (mL per Hour): 60  Tube Feed Duration (in Hours): 24  Tube Feed Start Time: 14:30  Free Water Flush  Free Water Flush Instructions:  30 ml/hr via pump  Liquid Protein Supplement     Qty per Day:  1 (01-09-23 @ 13:45)    Intake: Glucerna 1.2 @ goal of 60 ml/hr x 24 hrs (provides 1440 ml, 1728 kcal, 86 gram protein, 1166 H2O, 120% RDIs)    Current Weight: Weight (kg): 74.3 (1/11); admission wt 82.2 (11/14); wt of 111.3 (1/14) is most likely inaccurate as not in line c other wts;   % Weight Change:  9.6% wt loss x 2 months    No edema documented since 1/7; edema was 1= generalized at admission    Pertinent Medications: MEDICATIONS  (STANDING):  albuterol/ipratropium for Nebulization 3 milliLiter(s) Nebulizer every 6 hours  apixaban 5 milliGRAM(s) Oral every 12 hours  aspirin  chewable 81 milliGRAM(s) Oral daily  atorvastatin 20 milliGRAM(s) Oral at bedtime  bacitracin   Ointment 1 Application(s) Topical two times a day  budesonide 160 MICROgram(s)/formoterol 4.5 MICROgram(s) Inhaler 2 Puff(s) Inhalation two times a day  cefTRIAXone   IVPB 1000 milliGRAM(s) IV Intermittent every 24 hours  chlorhexidine 0.12% Liquid 15 milliLiter(s) Oral Mucosa every 12 hours  chlorhexidine 2% Cloths 1 Application(s) Topical <User Schedule>  collagenase Ointment 1 Application(s) Topical two times a day  cyanocobalamin 1000 MICROGram(s) Oral daily  diphenhydrAMINE Injectable 50 milliGRAM(s) IV Push once  divalproex  milliGRAM(s) Oral <User Schedule>  folic acid 1 milliGRAM(s) Oral daily  glycopyrrolate 1 milliGRAM(s) Oral two times a day  insulin glargine Injectable (LANTUS) 20 Unit(s) SubCutaneous every morning  insulin lispro (ADMELOG) corrective regimen sliding scale   SubCutaneous every 6 hours  lactulose Syrup 20 Gram(s) Oral once  levETIRAcetam 1500 milliGRAM(s) Oral two times a day  metoprolol tartrate 25 milliGRAM(s) Oral two times a day  polyethylene glycol 3350 17 Gram(s) Oral daily  saline laxative (FLEET) Rectal Enema 1 Enema Rectal once  scopolamine 1 mG/72 Hr(s) Patch 1 Patch Transdermal every 72 hours  senna 2 Tablet(s) Oral at bedtime  silver sulfADIAZINE 1% Cream 1 Application(s) Topical two times a day  valproate sodium  IVPB 750 milliGRAM(s) IV Intermittent once    MEDICATIONS  (PRN):  acetaminophen     Tablet .. 650 milliGRAM(s) Oral every 6 hours PRN Temp greater or equal to 38C (100.4F), Mild Pain (1 - 3)  albuterol    90 MICROgram(s) HFA Inhaler 2 Puff(s) Inhalation every 6 hours PRN Shortness of Breath and/or Wheezing  aluminum hydroxide/magnesium hydroxide/simethicone Suspension 30 milliLiter(s) Oral every 4 hours PRN Dyspepsia  oxycodone    5 mG/acetaminophen 325 mG 1 Tablet(s) Oral every 4 hours PRN Moderate Pain (4 - 6)    Pertinent Labs: 01-17 Na144 mmol/L Glu 149 mg/dL<H> K+ 4.1 mmol/L Cr  1.12 mg/dL BUN 33 mg/dL<H> 01-13 Phos 3.7 mg/dL 01-11 Alb 2.3 g/dL<L>  11-14 A1c 8.8%; 01-16 POCT: 119, 143, 147, 136    Skin:   01/09, WDL except pressure injury, 01/08, IAD, 12/02, skin tear, right buttock  Pressure ulcer x 1  1. pressure ulcer; sacrum; stage III    Estimated Needs:   [X ] no change since previous assessment  (1/9)  [ ] recalculated:     Previous New Nutrition Diagnosis: (12/23)  [x ] Malnutrition; moderate malnutrition in context of acute illness     Related to: increased protein energy needs in setting of cardiac arrest, acute respiratory failure, s/p ELMER, shock liver, pressure ulcers/wounds    As evidenced by: physical findings of mild muscle/fat loss   addendum; 01/09, moderate fat/muscle loss noted     Goal: pt to meet >75% protein-energy needs via tolerated route; met    Nutrition Diagnosis is [x ] ongoing  [ ] resolved [ ] not applicable     New Nutrition Diagnosis: [x ] not applicable       Interventions: continue current TF as noted  Recommend  [ ] Change Diet To:  [ ] Nutrition Supplement  [ ] Nutrition Support  [ ] Other:     Monitoring and Evaluation:   [ ] PO intake [ x ] Tolerance to diet prescription [ x ] weights [ x ] labs[ x ] follow up per protocol  [ ] other:

## 2023-01-17 NOTE — PROGRESS NOTE ADULT - SUBJECTIVE AND OBJECTIVE BOX
Patient is a 72y old  Male who presents with a chief complaint of s/p cardiac arrest, hypoglycemia (10 Brando 2023 10:35)      INTERVAL HPI/OVERNIGHT EVENTS:  Pt was seen and examined, legs have started to uncontrollably shake today and abd is distended       MEDICATIONS  (STANDING):  albuterol/ipratropium for Nebulization 3 milliLiter(s) Nebulizer every 6 hours  apixaban 5 milliGRAM(s) Oral every 12 hours  aspirin  chewable 81 milliGRAM(s) Oral daily  atorvastatin 20 milliGRAM(s) Oral at bedtime  bacitracin   Ointment 1 Application(s) Topical two times a day  budesonide 160 MICROgram(s)/formoterol 4.5 MICROgram(s) Inhaler 2 Puff(s) Inhalation two times a day  chlorhexidine 0.12% Liquid 15 milliLiter(s) Oral Mucosa every 12 hours  chlorhexidine 2% Cloths 1 Application(s) Topical <User Schedule>  collagenase Ointment 1 Application(s) Topical two times a day  cyanocobalamin 1000 MICROGram(s) Oral daily  diphenhydrAMINE Injectable 50 milliGRAM(s) IV Push once  folic acid 1 milliGRAM(s) Oral daily  glycopyrrolate 1 milliGRAM(s) Oral two times a day  insulin glargine Injectable (LANTUS) 20 Unit(s) SubCutaneous every morning  insulin lispro (ADMELOG) corrective regimen sliding scale   SubCutaneous every 6 hours  levETIRAcetam 1500 milliGRAM(s) Oral two times a day  metoprolol tartrate 25 milliGRAM(s) Oral two times a day  polyethylene glycol 3350 17 Gram(s) Oral daily  senna 2 Tablet(s) Oral at bedtime  silver sulfADIAZINE 1% Cream 1 Application(s) Topical two times a day    MEDICATIONS  (PRN):  acetaminophen     Tablet .. 650 milliGRAM(s) Oral every 6 hours PRN Temp greater or equal to 38C (100.4F), Mild Pain (1 - 3)  albuterol    90 MICROgram(s) HFA Inhaler 2 Puff(s) Inhalation every 6 hours PRN Shortness of Breath and/or Wheezing  aluminum hydroxide/magnesium hydroxide/simethicone Suspension 30 milliLiter(s) Oral every 4 hours PRN Dyspepsia        Allergies  No Known Allergies    Vital Signs Last 24 Hrs  T(C): 38.3 (17 Jan 2023 10:42), Max: 38.3 (17 Jan 2023 10:42)  T(F): 101 (17 Jan 2023 10:42), Max: 101 (17 Jan 2023 10:42)  HR: 74 (17 Jan 2023 10:42) (68 - 86)  BP: 109/71 (17 Jan 2023 10:42) (100/56 - 111/68)  BP(mean): --  RR: 18 (17 Jan 2023 10:42) (18 - 20)  SpO2: 98% (17 Jan 2023 10:42) (97% - 100%)    Parameters below as of 17 Jan 2023 10:42  Patient On (Oxygen Delivery Method): room air            PHYSICAL EXAM:  GENERAL: Obese, NAD  CHEST/LUNG: Decreased air entry bibasally. no wheezing. s/p trach , on trach collar today  HEART: Regular rate and rhythm; + murmur  ABDOMEN: Soft, Nontender, Nondistended; Bowel sounds present.s/p PEG  EXTREMITIES:  Moving all four extremities spontaneously, No clubbing, cyanosis, or edema.   NERVOUS SYSTEM:  Grossly non focal. followed simple commands. communicating with writing   Psychiatry: AAO x 3, mood is appropriate         LABS:                            10.0   8.38  )-----------( 265      ( 17 Jan 2023 06:35 )             31.9     01-17    144  |  107  |  33<H>  ----------------------------<  149<H>  4.1   |  29  |  1.12    Ca    8.9      17 Jan 2023 06:35                                                                                   RADIOLOGY & ADDITIONAL TESTS:    Imaging Personally Reviewed:  [ ] YES  [ ] NO    Consultant(s) Notes Reviewed:  [ ] YES  [ ] NO    Care Discussed with Consultants/Other Providers [ ] YES  [ ] NO Patient is a 72y old  Male who presents with a chief complaint of s/p cardiac arrest, hypoglycemia (10 Brando 2023 10:35)      INTERVAL HPI/OVERNIGHT EVENTS:  Pt was seen and examined, legs have started to uncontrollably shake today and abd is distended       MEDICATIONS  (STANDING):  albuterol/ipratropium for Nebulization 3 milliLiter(s) Nebulizer every 6 hours  apixaban 5 milliGRAM(s) Oral every 12 hours  aspirin  chewable 81 milliGRAM(s) Oral daily  atorvastatin 20 milliGRAM(s) Oral at bedtime  bacitracin   Ointment 1 Application(s) Topical two times a day  budesonide 160 MICROgram(s)/formoterol 4.5 MICROgram(s) Inhaler 2 Puff(s) Inhalation two times a day  chlorhexidine 0.12% Liquid 15 milliLiter(s) Oral Mucosa every 12 hours  chlorhexidine 2% Cloths 1 Application(s) Topical <User Schedule>  collagenase Ointment 1 Application(s) Topical two times a day  cyanocobalamin 1000 MICROGram(s) Oral daily  diphenhydrAMINE Injectable 50 milliGRAM(s) IV Push once  folic acid 1 milliGRAM(s) Oral daily  glycopyrrolate 1 milliGRAM(s) Oral two times a day  insulin glargine Injectable (LANTUS) 20 Unit(s) SubCutaneous every morning  insulin lispro (ADMELOG) corrective regimen sliding scale   SubCutaneous every 6 hours  levETIRAcetam 1500 milliGRAM(s) Oral two times a day  metoprolol tartrate 25 milliGRAM(s) Oral two times a day  polyethylene glycol 3350 17 Gram(s) Oral daily  senna 2 Tablet(s) Oral at bedtime  silver sulfADIAZINE 1% Cream 1 Application(s) Topical two times a day    MEDICATIONS  (PRN):  acetaminophen     Tablet .. 650 milliGRAM(s) Oral every 6 hours PRN Temp greater or equal to 38C (100.4F), Mild Pain (1 - 3)  albuterol    90 MICROgram(s) HFA Inhaler 2 Puff(s) Inhalation every 6 hours PRN Shortness of Breath and/or Wheezing  aluminum hydroxide/magnesium hydroxide/simethicone Suspension 30 milliLiter(s) Oral every 4 hours PRN Dyspepsia        Allergies  No Known Allergies    Vital Signs Last 24 Hrs  T(C): 38.3 (17 Jan 2023 10:42), Max: 38.3 (17 Jan 2023 10:42)  T(F): 101 (17 Jan 2023 10:42), Max: 101 (17 Jan 2023 10:42)  HR: 74 (17 Jan 2023 10:42) (68 - 86)  BP: 109/71 (17 Jan 2023 10:42) (100/56 - 111/68)  BP(mean): --  RR: 18 (17 Jan 2023 10:42) (18 - 20)  SpO2: 98% (17 Jan 2023 10:42) (97% - 100%)    Parameters below as of 17 Jan 2023 10:42  Patient On (Oxygen Delivery Method): room air            PHYSICAL EXAM:  GENERAL: Obese, NAD  CHEST/LUNG: Decreased air entry bibasally. no wheezing. s/p trach , on trach collar today  HEART: Regular rate and rhythm; + murmur  ABDOMEN: Soft, Nontender, Nondistended; Bowel sounds present.s/p PEG  EXTREMITIES:  Moving all four extremities spontaneously, No clubbing, cyanosis, or edema.   NERVOUS SYSTEM:  followed commands. communicating with writing well but now with increased uncontrlled movements of upper and mostly LE   Psychiatry: AAO x 3, mood is appropriate         LABS:                            10.0   8.38  )-----------( 265      ( 17 Jan 2023 06:35 )             31.9     01-17    144  |  107  |  33<H>  ----------------------------<  149<H>  4.1   |  29  |  1.12    Ca    8.9      17 Jan 2023 06:35                                                                                   RADIOLOGY & ADDITIONAL TESTS:    Imaging Personally Reviewed:  [ ] YES  [ ] NO    Consultant(s) Notes Reviewed:  [ ] YES  [ ] NO    Care Discussed with Consultants/Other Providers [ ] YES  [ ] NO

## 2023-01-17 NOTE — PROGRESS NOTE ADULT - ASSESSMENT
REVIEW OF SYMPTOMS      Able to give (reliable) ROS  NO     PHYSICAL EXAM    HEENT Unremarkable  atraumatic   RESP Fair air entry EXP prolonged    Harsh breath sound Resp distres mild   CARDIAC S1 S2 No S3     NO JVD    ABDOMEN SOFT BS PRESENT NOT DISTENDED No hepatosplenomegaly   PEDAL EDEMA present No calf tenderness  NO rash       GENERAL DATA .   GOC.  12/11/2022 full code       ALLGY.  nka                            WT. ..  12/2/2022 86  BMI. ..    12/2/2022 29                          ICU STAY.  .. 11/29-12/9  COVID.   .. 12/2/2022 scv2 (-)   .. 11/20/2022 scv2 (-)   BEST PRACTICE ISSUES.    HOB ELEVATN. Yes  DVT PPLX.    .. 1/3/2023 apixa 5.2 (vte)  12/12 l Subclav throm  ALEJO PPLX.   ..      INFN PPLX.   .. 11/25 chlorhex .12%   .. 11/14 chlorhex 2%   SP SW ANTWAN.         DIET.    ..  12/15 glucerna 1.2 1440 gt   IV fl.  ..            PROCEDURES.  .. 12/19 debridement of sacral wound   .. 12/5/2022 trach  .. 12/5/2022 peg   .. 12/12 r arm midline       ABGS.  1/6/2023 ac 16/450/.3/5 746/49/123     VS/ PO/IO/ VENT/ DRIPS.  1/17/2023 afeb 79 110/70   1/17/2023 ra 100%    PATIENT PRESENTATION.  72 m doa 11/14/2022 cac    PMH  PMH CAD s/p PCI with stent 2015 and CABG 2014,   PMH  s/p medtronic PPM,   PMH CVA (lacunar infarct)    HOSPITAL COURSE   .. 1) PEA arrest with ROSC after approximately 5 min 11/14  Now communicative   .. 2) DVT 12/12 l Subclav thromS 12/15/2022 lvnx 80.2 1/3 changed to apixaban 5.2  .. 3) TRACH 12/5/2022 trach  .. 4) PEG12/5/2022 peg   .. 5) Cellulitis hand absc 12/13 mod enterococcus fecalis  staph epi 12/12-12/15/2022  cephalexin 12/15 vanco once  12/16-12/19 zosyn  .. 6) Vent weaning     PROBLEM ASSESSMENT RECOMMENDATIONS.  WEANING   .. wean as tolerated  .. 1/4/2022  If able to tolerate cpap 5/5 x 2 h will place on tc   .. 1/5/2023 RICKY Valdovinos Tried on 35% tc   .. 1/6/2023 DW RT Pt to be on monitor during weaning trials and to be placed on vent at night and trach collar during daytime as tolerated   .. 1/7/2023 above reinforced to RT   .. 1/8/2023 placed on trach collar but has lot of secretns lasted 45 min   1/11/2023 tolerated tc several h  1/15/2023 Has been tolerating day time trach collar   TRACH LEAK  .. 1/9/2023 Notified by Hospitalist that pt is losing volunmes and that she is calling surgery to see if trach balloon has leak  HEMOPTYSIS  .. 1/13/2023 Pt having mild hemoptysis last 2 d   .. w 1/13-1/14/2023 w 10.4- 8.6   .. Hb 1/13/2023 Hb 9.7   .. Cr 1/13/2023 Cr .9   .. ct 1/13/2023 ct ch cw 1/4/2020 Stable bectasis and cystic changes basal l lower lobe and lingula Stable ssa r base   .. 1/13/2023 Hemoptysis likely sec to suctioning trauma in setting of full dose ac   .. 1/13/2023 VTE unlikely as cause of ac as pt is on fd ac   .. 1/13/2023 was eval by surgrey no trach bleed as ricky Arzate   .. 1/13/2023 infection is possibe cause sergio given bectasis so if persists will consider abio   INFECTION.  .. w 1/17/2023 w 8.3   .. pr 1/17/2023 (-)   .. 1/16/2023 Rocephin started by hospitalist  CHF   .. 11/14/2022 echo mod decr segmental lvsf apical lateral apiccal ant segment are abn takotsubo cmpthy pasp 42 .  .. Monitor for chf has chr hfref per echo   COPD   .. 12/30 symbicort 160   .. 1/7/2023 glycopyrrolate 1.2   .. 1/9/2023 ipratropium  .. 1/15/2023 scopolamine   .. continue bd ics for copd   Anemia.  .. Hb 1/12-1/14/2023 Hb 9.8- 10   .. target hb 7 (+)  .. monitor   sp cac   .. good neuro recovery awake alert interactive   VTE  .. 1/3/2023 apixa 5.2 (vte)      OVERALL   WEAN as told   HEMOPTYSIS Consider abio if persists  TRACH LEAK If persists trach ch  ROCEPHIN Started 1/16/2023 by hospitalist would limit to 5 d course       TIME SPENT   Over 25 minutes aggregate care time spent on encounter; activities included   direct patient care, counseling and/or coordinating care reviewing notes, lab data/ imaging , discussion with multidisciplinary team/ patient  /family and explaining in detail risks, benefits, alternatives  of the recommendations     BRANDON CAMARILLO

## 2023-01-18 LAB
CULTURE RESULTS: SIGNIFICANT CHANGE UP
GLUCOSE BLDC GLUCOMTR-MCNC: 117 MG/DL — HIGH (ref 70–99)
GLUCOSE BLDC GLUCOMTR-MCNC: 121 MG/DL — HIGH (ref 70–99)
GLUCOSE BLDC GLUCOMTR-MCNC: 125 MG/DL — HIGH (ref 70–99)
GLUCOSE BLDC GLUCOMTR-MCNC: 129 MG/DL — HIGH (ref 70–99)
GLUCOSE BLDC GLUCOMTR-MCNC: 168 MG/DL — HIGH (ref 70–99)
HCT VFR BLD CALC: 33.4 % — LOW (ref 39–50)
HGB BLD-MCNC: 10.4 G/DL — LOW (ref 13–17)
MCHC RBC-ENTMCNC: 30.5 PG — SIGNIFICANT CHANGE UP (ref 27–34)
MCHC RBC-ENTMCNC: 31.1 G/DL — LOW (ref 32–36)
MCV RBC AUTO: 97.9 FL — SIGNIFICANT CHANGE UP (ref 80–100)
NRBC # BLD: 0 /100 WBCS — SIGNIFICANT CHANGE UP (ref 0–0)
PLATELET # BLD AUTO: 231 K/UL — SIGNIFICANT CHANGE UP (ref 150–400)
RBC # BLD: 3.41 M/UL — LOW (ref 4.2–5.8)
RBC # FLD: 14.6 % — HIGH (ref 10.3–14.5)
SPECIMEN SOURCE: SIGNIFICANT CHANGE UP
WBC # BLD: 7.98 K/UL — SIGNIFICANT CHANGE UP (ref 3.8–10.5)
WBC # FLD AUTO: 7.98 K/UL — SIGNIFICANT CHANGE UP (ref 3.8–10.5)

## 2023-01-18 PROCEDURE — 95720 EEG PHY/QHP EA INCR W/VEEG: CPT

## 2023-01-18 PROCEDURE — 99233 SBSQ HOSP IP/OBS HIGH 50: CPT

## 2023-01-18 PROCEDURE — 99232 SBSQ HOSP IP/OBS MODERATE 35: CPT

## 2023-01-18 RX ORDER — VALPROIC ACID (AS SODIUM SALT) 250 MG/5ML
500 SOLUTION, ORAL ORAL
Refills: 0 | Status: DISCONTINUED | OUTPATIENT
Start: 2023-01-18 | End: 2023-01-20

## 2023-01-18 RX ADMIN — BUDESONIDE AND FORMOTEROL FUMARATE DIHYDRATE 2 PUFF(S): 160; 4.5 AEROSOL RESPIRATORY (INHALATION) at 05:26

## 2023-01-18 RX ADMIN — Medication 1 MILLIGRAM(S): at 12:47

## 2023-01-18 RX ADMIN — Medication 650 MILLIGRAM(S): at 06:30

## 2023-01-18 RX ADMIN — CHLORHEXIDINE GLUCONATE 15 MILLILITER(S): 213 SOLUTION TOPICAL at 17:30

## 2023-01-18 RX ADMIN — Medication 1 APPLICATION(S): at 17:29

## 2023-01-18 RX ADMIN — Medication 650 MILLIGRAM(S): at 05:13

## 2023-01-18 RX ADMIN — ROBINUL 1 MILLIGRAM(S): 0.2 INJECTION INTRAMUSCULAR; INTRAVENOUS at 17:24

## 2023-01-18 RX ADMIN — Medication 1 APPLICATION(S): at 05:12

## 2023-01-18 RX ADMIN — SCOPALAMINE 1 PATCH: 1 PATCH, EXTENDED RELEASE TRANSDERMAL at 23:00

## 2023-01-18 RX ADMIN — Medication 250 MILLIGRAM(S): at 08:14

## 2023-01-18 RX ADMIN — INSULIN GLARGINE 20 UNIT(S): 100 INJECTION, SOLUTION SUBCUTANEOUS at 08:14

## 2023-01-18 RX ADMIN — APIXABAN 5 MILLIGRAM(S): 2.5 TABLET, FILM COATED ORAL at 05:13

## 2023-01-18 RX ADMIN — APIXABAN 5 MILLIGRAM(S): 2.5 TABLET, FILM COATED ORAL at 17:23

## 2023-01-18 RX ADMIN — SCOPALAMINE 1 PATCH: 1 PATCH, EXTENDED RELEASE TRANSDERMAL at 08:08

## 2023-01-18 RX ADMIN — Medication 25 MILLIGRAM(S): at 17:24

## 2023-01-18 RX ADMIN — LEVETIRACETAM 1500 MILLIGRAM(S): 250 TABLET, FILM COATED ORAL at 05:13

## 2023-01-18 RX ADMIN — SENNA PLUS 2 TABLET(S): 8.6 TABLET ORAL at 22:18

## 2023-01-18 RX ADMIN — Medication 1 APPLICATION(S): at 17:30

## 2023-01-18 RX ADMIN — PREGABALIN 1000 MICROGRAM(S): 225 CAPSULE ORAL at 12:47

## 2023-01-18 RX ADMIN — Medication 1 APPLICATION(S): at 05:11

## 2023-01-18 RX ADMIN — Medication 3 MILLILITER(S): at 05:25

## 2023-01-18 RX ADMIN — Medication 500 MILLIGRAM(S): at 19:59

## 2023-01-18 RX ADMIN — Medication 81 MILLIGRAM(S): at 12:47

## 2023-01-18 RX ADMIN — Medication 3 MILLILITER(S): at 17:38

## 2023-01-18 RX ADMIN — ROBINUL 1 MILLIGRAM(S): 0.2 INJECTION INTRAMUSCULAR; INTRAVENOUS at 05:15

## 2023-01-18 RX ADMIN — CHLORHEXIDINE GLUCONATE 1 APPLICATION(S): 213 SOLUTION TOPICAL at 05:11

## 2023-01-18 RX ADMIN — Medication 3 MILLILITER(S): at 11:12

## 2023-01-18 RX ADMIN — LEVETIRACETAM 1500 MILLIGRAM(S): 250 TABLET, FILM COATED ORAL at 17:24

## 2023-01-18 RX ADMIN — CEFTRIAXONE 100 MILLIGRAM(S): 500 INJECTION, POWDER, FOR SOLUTION INTRAMUSCULAR; INTRAVENOUS at 12:46

## 2023-01-18 RX ADMIN — Medication 3 MILLILITER(S): at 00:05

## 2023-01-18 RX ADMIN — BUDESONIDE AND FORMOTEROL FUMARATE DIHYDRATE 2 PUFF(S): 160; 4.5 AEROSOL RESPIRATORY (INHALATION) at 17:38

## 2023-01-18 RX ADMIN — ATORVASTATIN CALCIUM 20 MILLIGRAM(S): 80 TABLET, FILM COATED ORAL at 22:18

## 2023-01-18 RX ADMIN — SCOPALAMINE 1 PATCH: 1 PATCH, EXTENDED RELEASE TRANSDERMAL at 19:30

## 2023-01-18 RX ADMIN — POLYETHYLENE GLYCOL 3350 17 GRAM(S): 17 POWDER, FOR SOLUTION ORAL at 12:47

## 2023-01-18 RX ADMIN — CHLORHEXIDINE GLUCONATE 15 MILLILITER(S): 213 SOLUTION TOPICAL at 05:10

## 2023-01-18 NOTE — PROGRESS NOTE ADULT - SUBJECTIVE AND OBJECTIVE BOX
Patient is a 72y old  Male who presents with a chief complaint of s/p cardiac arrest, hypoglycemia (10 Brando 2023 10:35)      INTERVAL HPI/OVERNIGHT EVENTS:  still with myoclonus       MEDICATIONS  (STANDING):  albuterol/ipratropium for Nebulization 3 milliLiter(s) Nebulizer every 6 hours  apixaban 5 milliGRAM(s) Oral every 12 hours  aspirin  chewable 81 milliGRAM(s) Oral daily  atorvastatin 20 milliGRAM(s) Oral at bedtime  bacitracin   Ointment 1 Application(s) Topical two times a day  budesonide 160 MICROgram(s)/formoterol 4.5 MICROgram(s) Inhaler 2 Puff(s) Inhalation two times a day  cefTRIAXone   IVPB 1000 milliGRAM(s) IV Intermittent every 24 hours  chlorhexidine 0.12% Liquid 15 milliLiter(s) Oral Mucosa every 12 hours  chlorhexidine 2% Cloths 1 Application(s) Topical <User Schedule>  collagenase Ointment 1 Application(s) Topical two times a day  cyanocobalamin 1000 MICROGram(s) Oral daily  diphenhydrAMINE Injectable 50 milliGRAM(s) IV Push once  folic acid 1 milliGRAM(s) Oral daily  glycopyrrolate 1 milliGRAM(s) Oral two times a day  insulin glargine Injectable (LANTUS) 20 Unit(s) SubCutaneous every morning  insulin lispro (ADMELOG) corrective regimen sliding scale   SubCutaneous every 6 hours  levETIRAcetam 1500 milliGRAM(s) Oral two times a day  metoprolol tartrate 25 milliGRAM(s) Oral two times a day  polyethylene glycol 3350 17 Gram(s) Oral daily  saline laxative (FLEET) Rectal Enema 1 Enema Rectal once  scopolamine 1 mG/72 Hr(s) Patch 1 Patch Transdermal every 72 hours  senna 2 Tablet(s) Oral at bedtime  silver sulfADIAZINE 1% Cream 1 Application(s) Topical two times a day  valproic  acid Syrup 250 milliGRAM(s) Oral <User Schedule>    MEDICATIONS  (PRN):  acetaminophen     Tablet .. 650 milliGRAM(s) Oral every 6 hours PRN Temp greater or equal to 38C (100.4F), Mild Pain (1 - 3)  albuterol    90 MICROgram(s) HFA Inhaler 2 Puff(s) Inhalation every 6 hours PRN Shortness of Breath and/or Wheezing  aluminum hydroxide/magnesium hydroxide/simethicone Suspension 30 milliLiter(s) Oral every 4 hours PRN Dyspepsia  oxycodone    5 mG/acetaminophen 325 mG 1 Tablet(s) Oral every 4 hours PRN Moderate Pain (4 - 6)        Allergies  No Known Allergies    Vital Signs Last 24 Hrs  T(C): 36.7 (18 Jan 2023 10:54), Max: 37.2 (17 Jan 2023 17:20)  T(F): 98.1 (18 Jan 2023 10:54), Max: 98.9 (17 Jan 2023 17:20)  HR: 67 (18 Jan 2023 10:58) (63 - 95)  BP: 103/61 (18 Jan 2023 10:54) (103/61 - 114/74)  BP(mean): --  RR: 18 (18 Jan 2023 10:54) (18 - 19)  SpO2: 98% (18 Jan 2023 10:58) (98% - 100%)    Parameters below as of 18 Jan 2023 10:54  Patient On (Oxygen Delivery Method): room air            PHYSICAL EXAM:  GENERAL: Obese, NAD  CHEST/LUNG: Decreased air entry bibasally. no wheezing. s/p trach , on trach collar today  HEART: Regular rate and rhythm; + murmur  ABDOMEN: Soft, Nontender, Nondistended; Bowel sounds present.s/p PEG  EXTREMITIES:  Moving all four extremities spontaneously, No clubbing, cyanosis, or edema.   NERVOUS SYSTEM:  followed commands. communicating with writing well but now with increased uncontrlled movements of upper and mostly LE   Psychiatry: AAO x 3, mood is appropriate         LABS:                        10.4   7.98  )-----------( 231      ( 18 Jan 2023 07:28 )             33.4     01-17    144  |  107  |  33<H>  ----------------------------<  149<H>  4.1   |  29  |  1.12    Ca    8.9      17 Jan 2023 06:35                                                                    RADIOLOGY & ADDITIONAL TESTS:    Imaging Personally Reviewed:  [ ] YES  [ ] NO    Consultant(s) Notes Reviewed:  [ ] YES  [ ] NO    Care Discussed with Consultants/Other Providers [ ] YES  [ ] NO

## 2023-01-18 NOTE — EEG REPORT - NS EEG TEXT BOX
BRANDON CAMARILLO [id number]     Study Date: 01-18-23 09:52-10:12  Duration: 20 min  --------------------------------------------------------------------------------------------------  History:  CC/ HPI Patient is a 72y old  Male who presents with a chief complaint of s/p cardiac arrest, hypoglycemia (18 Jan 2023 07:06)    MEDICATIONS  (STANDING):  albuterol/ipratropium for Nebulization 3 milliLiter(s) Nebulizer every 6 hours  apixaban 5 milliGRAM(s) Oral every 12 hours  aspirin  chewable 81 milliGRAM(s) Oral daily  atorvastatin 20 milliGRAM(s) Oral at bedtime  bacitracin   Ointment 1 Application(s) Topical two times a day  budesonide 160 MICROgram(s)/formoterol 4.5 MICROgram(s) Inhaler 2 Puff(s) Inhalation two times a day  cefTRIAXone   IVPB 1000 milliGRAM(s) IV Intermittent every 24 hours  chlorhexidine 0.12% Liquid 15 milliLiter(s) Oral Mucosa every 12 hours  chlorhexidine 2% Cloths 1 Application(s) Topical <User Schedule>  collagenase Ointment 1 Application(s) Topical two times a day  cyanocobalamin 1000 MICROGram(s) Oral daily  diphenhydrAMINE Injectable 50 milliGRAM(s) IV Push once  folic acid 1 milliGRAM(s) Oral daily  glycopyrrolate 1 milliGRAM(s) Oral two times a day  insulin glargine Injectable (LANTUS) 20 Unit(s) SubCutaneous every morning  insulin lispro (ADMELOG) corrective regimen sliding scale   SubCutaneous every 6 hours  levETIRAcetam 1500 milliGRAM(s) Oral two times a day  metoprolol tartrate 25 milliGRAM(s) Oral two times a day  polyethylene glycol 3350 17 Gram(s) Oral daily  saline laxative (FLEET) Rectal Enema 1 Enema Rectal once  scopolamine 1 mG/72 Hr(s) Patch 1 Patch Transdermal every 72 hours  senna 2 Tablet(s) Oral at bedtime  silver sulfADIAZINE 1% Cream 1 Application(s) Topical two times a day  valproic  acid Syrup 250 milliGRAM(s) Oral <User Schedule>    --------------------------------------------------------------------------------------------------  Study Interpretation:    [[[Abbreviation Key:  PDR=alpha rhythm/posterior dominant rhythm. A-P=anterior posterior.  Amplitude: ‘very low’:<20; ‘low’:20-49; ‘medium’:; ‘high’:>150uV.  Persistence for periodic/rhythmic patterns (% of epoch) ‘rare’:<1%; ‘occasional’:1-10%; ‘frequent’:10-50%; ‘abundant’:50-90%; ‘continuous’:>90%.  Persistence for sporadic discharges: ‘rare’:<1/hr; ‘occasional’:1/min-1/hr; ‘frequent’:>1/min; ‘abundant’:>1/10 sec.  RPP=rhythmic and periodic patterns; GRDA=generalized rhythmic delta activity; FIRDA=frontal intermittent GRDA; LRDA=lateralized rhythmic delta activity; TIRDA=temporal intermittent rhythmic delta activity;  LPD=PLED=lateralized periodic discharges; GPD=generalized periodic discharges; BIPDs =bilateral independent periodic discharges; Mf=multifocal; SIRPDs=stimulus induced rhythmic, periodic, or ictal appearing discharges; BIRDs=brief potentially ictal rhythmic discharges >4 Hz, lasting .5-10s; PFA (paroxysmal bursts >13 Hz or =8 Hz <10s).  Modifiers: +F=with fast component; +S=with spike component; +R=with rhythmic component.  S-B=burst suppression pattern.  Max=maximal. N1-drowsy; N2-stage II sleep; N3-slow wave sleep. SSS/BETS=small sharp spikes/benign epileptiform transients of sleep. HV=hyperventilation; PS=photic stimulation]]]    Daily EEG Visual Analysis    FINDINGS:      Background:  Continuity: Continuous  Symmetry: Symmetric  Posterior dominant rhythm (PDR): 7.5-8 Hz, reactive to eye closure. Symmetric low-amplitude frontal beta in wakefulness.  State Change: Not captured  Voltage: Normal  Anterior Posterior Gradient: Present  Other background findings: None  Breach: Absent    Background Slowing:  Generalized slowing: Mild  Focal slowing: Rare right hemispheric focal polymorphic delta slowing    State Changes:   Early drowsiness is characterized by fragmentation, attenuation, and slowing of the background activity, with appearance of rare diffuse polymorphic delta slowing.  Stage 2 sleep is not captured.    Sporadic Epileptiform Discharges and Rhythmic and Periodic Patterns (RPPs):  Abundant paracentral spike/polyspike-wave discharges (shifting between C3, C4, Cz, or all of these). These most often occur in bursts and runs at 1-3 Hz, fluctuating in frequency, at times with intermixed fast activity, without clear evolution. These often have no clinical correlate. At times there is clinical correlate of myoclonic jerking as described below.    Electrographic and Electroclinical seizures:  Paracentral focal electroclinical myoclonic seizures:  Intermittent jerking of abdomen and left or bilateral lower extremities is present - jerking correlates with the above-described paracentral discharges. The patient is awake and makes purposeful movements during jerking episodes.  The patient's usual awake background is seen immediately before and after jerking episodes, and also during episodes in between muscle artifact and epileptiform discharges.    Other Clinical Events:  None    Activation Procedures:   Hyperventilation was not performed.    Photic stimulation was not performed.    Artifacts:  Intermittent myogenic and movement artifacts are present.    EKG:  Single-lead EKG shows    EEG Classification / Summary:  Abnormal EEG in the awake, drowsy, and asleep states due to:  -Paracentral myoclonic seizures - clinical correlate of jerking of abdomen and left or bilateral lower extremities. The patient is awake and makes purposeful movements during jerking episodes.  -Abundant paracentral spike/polyspike-wave discharges most often occurring in bursts and runs without clear evolution or clinical correlate  -Rare right hemispheric focal slowing  -Mild diffuse slowing    Clinical Impression:  -These findings indicate a risk of further myoclonic seizures from the paracentral regions.  -Rare right hemispheric focal slowing indicates focal cerebral dysfunction in this region, which can be structural or functional in etiology.  -Mild diffuse slowing indicates mild diffuse cerebral dysfunction of nonspecific etiology.    The patient is already connected to video-EEG monitoring.      -------------------------------------------------------------------------------------------------------  [facility] EEG Reading Room Ph#: [phone]  Epilepsy Answering Service after 5PM and before 8:30AM: Ph#: [phone]    Viktoriya Nieves MD  Attending Physician, [facility]   BRANDON CAMARILLO [id number]     ROUTINE EEG WITH VIDEO    Study Date: 01-18-23 09:52-10:12  Duration: 20 min  --------------------------------------------------------------------------------------------------  History:  CC/ HPI Patient is a 72y old  Male who presents with a chief complaint of s/p cardiac arrest, hypoglycemia (18 Jan 2023 07:06)    MEDICATIONS  (STANDING):  albuterol/ipratropium for Nebulization 3 milliLiter(s) Nebulizer every 6 hours  apixaban 5 milliGRAM(s) Oral every 12 hours  aspirin  chewable 81 milliGRAM(s) Oral daily  atorvastatin 20 milliGRAM(s) Oral at bedtime  bacitracin   Ointment 1 Application(s) Topical two times a day  budesonide 160 MICROgram(s)/formoterol 4.5 MICROgram(s) Inhaler 2 Puff(s) Inhalation two times a day  cefTRIAXone   IVPB 1000 milliGRAM(s) IV Intermittent every 24 hours  chlorhexidine 0.12% Liquid 15 milliLiter(s) Oral Mucosa every 12 hours  chlorhexidine 2% Cloths 1 Application(s) Topical <User Schedule>  collagenase Ointment 1 Application(s) Topical two times a day  cyanocobalamin 1000 MICROGram(s) Oral daily  diphenhydrAMINE Injectable 50 milliGRAM(s) IV Push once  folic acid 1 milliGRAM(s) Oral daily  glycopyrrolate 1 milliGRAM(s) Oral two times a day  insulin glargine Injectable (LANTUS) 20 Unit(s) SubCutaneous every morning  insulin lispro (ADMELOG) corrective regimen sliding scale   SubCutaneous every 6 hours  levETIRAcetam 1500 milliGRAM(s) Oral two times a day  metoprolol tartrate 25 milliGRAM(s) Oral two times a day  polyethylene glycol 3350 17 Gram(s) Oral daily  saline laxative (FLEET) Rectal Enema 1 Enema Rectal once  scopolamine 1 mG/72 Hr(s) Patch 1 Patch Transdermal every 72 hours  senna 2 Tablet(s) Oral at bedtime  silver sulfADIAZINE 1% Cream 1 Application(s) Topical two times a day  valproic  acid Syrup 250 milliGRAM(s) Oral <User Schedule>    --------------------------------------------------------------------------------------------------  Study Interpretation:    [[[Abbreviation Key:  PDR=alpha rhythm/posterior dominant rhythm. A-P=anterior posterior.  Amplitude: ‘very low’:<20; ‘low’:20-49; ‘medium’:; ‘high’:>150uV.  Persistence for periodic/rhythmic patterns (% of epoch) ‘rare’:<1%; ‘occasional’:1-10%; ‘frequent’:10-50%; ‘abundant’:50-90%; ‘continuous’:>90%.  Persistence for sporadic discharges: ‘rare’:<1/hr; ‘occasional’:1/min-1/hr; ‘frequent’:>1/min; ‘abundant’:>1/10 sec.  RPP=rhythmic and periodic patterns; GRDA=generalized rhythmic delta activity; FIRDA=frontal intermittent GRDA; LRDA=lateralized rhythmic delta activity; TIRDA=temporal intermittent rhythmic delta activity;  LPD=PLED=lateralized periodic discharges; GPD=generalized periodic discharges; BIPDs =bilateral independent periodic discharges; Mf=multifocal; SIRPDs=stimulus induced rhythmic, periodic, or ictal appearing discharges; BIRDs=brief potentially ictal rhythmic discharges >4 Hz, lasting .5-10s; PFA (paroxysmal bursts >13 Hz or =8 Hz <10s).  Modifiers: +F=with fast component; +S=with spike component; +R=with rhythmic component.  S-B=burst suppression pattern.  Max=maximal. N1-drowsy; N2-stage II sleep; N3-slow wave sleep. SSS/BETS=small sharp spikes/benign epileptiform transients of sleep. HV=hyperventilation; PS=photic stimulation]]]    Daily EEG Visual Analysis    FINDINGS:      Background:  Continuity: Continuous  Symmetry: Symmetric  Posterior dominant rhythm (PDR): 7.5-8 Hz, reactive to eye closure. Symmetric low-amplitude frontal beta in wakefulness.  State Change: Not captured  Voltage: Normal  Anterior Posterior Gradient: Present  Other background findings: None  Breach: Absent    Background Slowing:  Generalized slowing: Mild  Focal slowing: Rare right hemispheric focal polymorphic delta slowing    State Changes:   Early drowsiness is characterized by fragmentation, attenuation, and slowing of the background activity, with appearance of rare diffuse polymorphic delta slowing.  Stage 2 sleep is not captured.    Sporadic Epileptiform Discharges and Rhythmic and Periodic Patterns (RPPs):  Abundant paracentral spike/polyspike-wave discharges (shifting between C3, C4, Cz, or all of these). These most often occur in bursts and runs at 1-3 Hz, fluctuating in frequency, at times with intermixed fast activity, without clear evolution. These often have no clinical correlate. At times there is clinical correlate of myoclonic jerking as described below.    Electrographic and Electroclinical seizures:  Paracentral focal electroclinical myoclonic seizures:  Intermittent jerking of abdomen and left or bilateral lower extremities is present - jerking correlates with the above-described paracentral discharges. The patient is awake and makes purposeful movements during jerking episodes.  The patient's usual awake background is seen immediately before and after jerking episodes, and also during episodes in between muscle artifact and epileptiform discharges.    Other Clinical Events:  None    Activation Procedures:   Hyperventilation was not performed.    Photic stimulation was not performed.    Artifacts:  Intermittent myogenic and movement artifacts are present.    EKG:  Single-lead EKG shows    EEG Classification / Summary:  Abnormal EEG in the awake, drowsy, and asleep states due to:  -Paracentral myoclonic seizures - clinical correlate of jerking of abdomen and left or bilateral lower extremities. The patient is awake and makes purposeful movements during jerking episodes.  -Abundant paracentral spike/polyspike-wave discharges most often occurring in bursts and runs without clear evolution or clinical correlate  -Rare right hemispheric focal slowing  -Mild diffuse slowing    Clinical Impression:  -These findings indicate a risk of further myoclonic seizures from the paracentral regions.  -Rare right hemispheric focal slowing indicates focal cerebral dysfunction in this region, which can be structural or functional in etiology.  -Mild diffuse slowing indicates mild diffuse cerebral dysfunction of nonspecific etiology.    The patient is already connected to video-EEG monitoring.      -------------------------------------------------------------------------------------------------------  [facility] EEG Reading Room Ph#: [phone]  Epilepsy Answering Service after 5PM and before 8:30AM: Ph#: [phone]    Viktoriya Nieves MD  Attending Physician, [facility]   BRANDON CAMARILLO [id number]     ROUTINE EEG WITH VIDEO    Study Date: 01-18-23 09:52-10:12  Duration: 20 min  --------------------------------------------------------------------------------------------------  History:  CC/ HPI Patient is a 72y old  Male who presents with a chief complaint of s/p cardiac arrest, hypoglycemia (18 Jan 2023 07:06)    MEDICATIONS  (STANDING):  albuterol/ipratropium for Nebulization 3 milliLiter(s) Nebulizer every 6 hours  apixaban 5 milliGRAM(s) Oral every 12 hours  aspirin  chewable 81 milliGRAM(s) Oral daily  atorvastatin 20 milliGRAM(s) Oral at bedtime  bacitracin   Ointment 1 Application(s) Topical two times a day  budesonide 160 MICROgram(s)/formoterol 4.5 MICROgram(s) Inhaler 2 Puff(s) Inhalation two times a day  cefTRIAXone   IVPB 1000 milliGRAM(s) IV Intermittent every 24 hours  chlorhexidine 0.12% Liquid 15 milliLiter(s) Oral Mucosa every 12 hours  chlorhexidine 2% Cloths 1 Application(s) Topical <User Schedule>  collagenase Ointment 1 Application(s) Topical two times a day  cyanocobalamin 1000 MICROGram(s) Oral daily  diphenhydrAMINE Injectable 50 milliGRAM(s) IV Push once  folic acid 1 milliGRAM(s) Oral daily  glycopyrrolate 1 milliGRAM(s) Oral two times a day  insulin glargine Injectable (LANTUS) 20 Unit(s) SubCutaneous every morning  insulin lispro (ADMELOG) corrective regimen sliding scale   SubCutaneous every 6 hours  levETIRAcetam 1500 milliGRAM(s) Oral two times a day  metoprolol tartrate 25 milliGRAM(s) Oral two times a day  polyethylene glycol 3350 17 Gram(s) Oral daily  saline laxative (FLEET) Rectal Enema 1 Enema Rectal once  scopolamine 1 mG/72 Hr(s) Patch 1 Patch Transdermal every 72 hours  senna 2 Tablet(s) Oral at bedtime  silver sulfADIAZINE 1% Cream 1 Application(s) Topical two times a day  valproic  acid Syrup 250 milliGRAM(s) Oral <User Schedule>    --------------------------------------------------------------------------------------------------  Study Interpretation:    [[[Abbreviation Key:  PDR=alpha rhythm/posterior dominant rhythm. A-P=anterior posterior.  Amplitude: ‘very low’:<20; ‘low’:20-49; ‘medium’:; ‘high’:>150uV.  Persistence for periodic/rhythmic patterns (% of epoch) ‘rare’:<1%; ‘occasional’:1-10%; ‘frequent’:10-50%; ‘abundant’:50-90%; ‘continuous’:>90%.  Persistence for sporadic discharges: ‘rare’:<1/hr; ‘occasional’:1/min-1/hr; ‘frequent’:>1/min; ‘abundant’:>1/10 sec.  RPP=rhythmic and periodic patterns; GRDA=generalized rhythmic delta activity; FIRDA=frontal intermittent GRDA; LRDA=lateralized rhythmic delta activity; TIRDA=temporal intermittent rhythmic delta activity;  LPD=PLED=lateralized periodic discharges; GPD=generalized periodic discharges; BIPDs =bilateral independent periodic discharges; Mf=multifocal; SIRPDs=stimulus induced rhythmic, periodic, or ictal appearing discharges; BIRDs=brief potentially ictal rhythmic discharges >4 Hz, lasting .5-10s; PFA (paroxysmal bursts >13 Hz or =8 Hz <10s).  Modifiers: +F=with fast component; +S=with spike component; +R=with rhythmic component.  S-B=burst suppression pattern.  Max=maximal. N1-drowsy; N2-stage II sleep; N3-slow wave sleep. SSS/BETS=small sharp spikes/benign epileptiform transients of sleep. HV=hyperventilation; PS=photic stimulation]]]    Daily EEG Visual Analysis    FINDINGS:      Background:  Continuity: Continuous  Symmetry: Symmetric  Posterior dominant rhythm (PDR): 7.5-8 Hz, reactive to eye closure. Symmetric low-amplitude frontal beta in wakefulness.  State Change: Not captured  Voltage: Normal  Anterior Posterior Gradient: Present  Other background findings: None  Breach: Absent    Background Slowing:  Generalized slowing: Mild  Focal slowing: Rare right hemispheric focal polymorphic delta slowing    State Changes:   Early drowsiness is characterized by fragmentation, attenuation, and slowing of the background activity, with appearance of rare diffuse polymorphic delta slowing.  Stage 2 sleep is not captured.    Sporadic Epileptiform Discharges and Rhythmic and Periodic Patterns (RPPs):  Abundant paracentral spike/polyspike-wave discharges (shifting between C3, C4, Cz, or all of these). These most often occur in bursts and runs at 1-3 Hz, fluctuating in frequency, at times with intermixed fast activity, without clear evolution. These often have no clinical correlate. At times there is clinical correlate of myoclonic jerking as described below.    Electrographic and Electroclinical seizures:  Paracentral focal electroclinical myoclonic seizures:  Intermittent jerking of abdomen and left or bilateral lower extremities is present - jerking correlates with the above-described paracentral discharges. The patient is awake and makes purposeful movements during jerking episodes.  The patient's usual awake background is seen immediately before and after jerking episodes, and also during episodes in between muscle artifact and epileptiform discharges.    Other Clinical Events:  None    Activation Procedures:   Hyperventilation was not performed.    Photic stimulation was not performed.    Artifacts:  Intermittent myogenic and movement artifacts are present.    EKG:  Single-lead EKG shows    EEG Classification / Summary:  Abnormal EEG in the awake and drowsy states due to:  -Paracentral myoclonic seizures - clinical correlate of jerking of abdomen and left or bilateral lower extremities. The patient is awake and makes purposeful movements during jerking episodes.  -Abundant paracentral spike/polyspike-wave discharges most often occurring in bursts and runs without clear evolution or clinical correlate  -Rare right hemispheric focal slowing  -Mild diffuse slowing    Clinical Impression:  -These findings indicate a risk of further myoclonic seizures from the paracentral regions.  -Rare right hemispheric focal slowing indicates focal cerebral dysfunction in this region, which can be structural or functional in etiology.  -Mild diffuse slowing indicates mild diffuse cerebral dysfunction of nonspecific etiology.    The patient is already connected to video-EEG monitoring.      -------------------------------------------------------------------------------------------------------  [facility] EEG Reading Room Ph#: [phone]  Epilepsy Answering Service after 5PM and before 8:30AM: Ph#: [phone]    Viktoriya Nieves MD  Attending Physician, [facility]   BRANDON CAMARILLO [id number]     ROUTINE EEG WITH VIDEO    Study Date: 01-18-23 09:52-10:12  Duration: 20 min  --------------------------------------------------------------------------------------------------  History:  CC/ HPI Patient is a 72y old  Male who presents with a chief complaint of s/p cardiac arrest, hypoglycemia (18 Jan 2023 07:06)    MEDICATIONS  (STANDING):  albuterol/ipratropium for Nebulization 3 milliLiter(s) Nebulizer every 6 hours  apixaban 5 milliGRAM(s) Oral every 12 hours  aspirin  chewable 81 milliGRAM(s) Oral daily  atorvastatin 20 milliGRAM(s) Oral at bedtime  bacitracin   Ointment 1 Application(s) Topical two times a day  budesonide 160 MICROgram(s)/formoterol 4.5 MICROgram(s) Inhaler 2 Puff(s) Inhalation two times a day  cefTRIAXone   IVPB 1000 milliGRAM(s) IV Intermittent every 24 hours  chlorhexidine 0.12% Liquid 15 milliLiter(s) Oral Mucosa every 12 hours  chlorhexidine 2% Cloths 1 Application(s) Topical <User Schedule>  collagenase Ointment 1 Application(s) Topical two times a day  cyanocobalamin 1000 MICROGram(s) Oral daily  diphenhydrAMINE Injectable 50 milliGRAM(s) IV Push once  folic acid 1 milliGRAM(s) Oral daily  glycopyrrolate 1 milliGRAM(s) Oral two times a day  insulin glargine Injectable (LANTUS) 20 Unit(s) SubCutaneous every morning  insulin lispro (ADMELOG) corrective regimen sliding scale   SubCutaneous every 6 hours  levETIRAcetam 1500 milliGRAM(s) Oral two times a day  metoprolol tartrate 25 milliGRAM(s) Oral two times a day  polyethylene glycol 3350 17 Gram(s) Oral daily  saline laxative (FLEET) Rectal Enema 1 Enema Rectal once  scopolamine 1 mG/72 Hr(s) Patch 1 Patch Transdermal every 72 hours  senna 2 Tablet(s) Oral at bedtime  silver sulfADIAZINE 1% Cream 1 Application(s) Topical two times a day  valproic  acid Syrup 250 milliGRAM(s) Oral <User Schedule>    --------------------------------------------------------------------------------------------------  Study Interpretation:    [[[Abbreviation Key:  PDR=alpha rhythm/posterior dominant rhythm. A-P=anterior posterior.  Amplitude: ‘very low’:<20; ‘low’:20-49; ‘medium’:; ‘high’:>150uV.  Persistence for periodic/rhythmic patterns (% of epoch) ‘rare’:<1%; ‘occasional’:1-10%; ‘frequent’:10-50%; ‘abundant’:50-90%; ‘continuous’:>90%.  Persistence for sporadic discharges: ‘rare’:<1/hr; ‘occasional’:1/min-1/hr; ‘frequent’:>1/min; ‘abundant’:>1/10 sec.  RPP=rhythmic and periodic patterns; GRDA=generalized rhythmic delta activity; FIRDA=frontal intermittent GRDA; LRDA=lateralized rhythmic delta activity; TIRDA=temporal intermittent rhythmic delta activity;  LPD=PLED=lateralized periodic discharges; GPD=generalized periodic discharges; BIPDs =bilateral independent periodic discharges; Mf=multifocal; SIRPDs=stimulus induced rhythmic, periodic, or ictal appearing discharges; BIRDs=brief potentially ictal rhythmic discharges >4 Hz, lasting .5-10s; PFA (paroxysmal bursts >13 Hz or =8 Hz <10s).  Modifiers: +F=with fast component; +S=with spike component; +R=with rhythmic component.  S-B=burst suppression pattern.  Max=maximal. N1-drowsy; N2-stage II sleep; N3-slow wave sleep. SSS/BETS=small sharp spikes/benign epileptiform transients of sleep. HV=hyperventilation; PS=photic stimulation]]]    Daily EEG Visual Analysis    FINDINGS:      Background:  Continuity: Continuous  Symmetry: Symmetric  Posterior dominant rhythm (PDR): 7.5-8 Hz, reactive to eye closure. Symmetric low-amplitude frontal beta in wakefulness.  State Change: Not captured  Voltage: Normal  Anterior Posterior Gradient: Present  Other background findings: None  Breach: Absent    Background Slowing:  Generalized slowing: Mild  Focal slowing: Rare right hemispheric focal polymorphic delta slowing    State Changes:   Early drowsiness is characterized by fragmentation, attenuation, and slowing of the background activity, with appearance of rare diffuse polymorphic delta slowing.  Stage 2 sleep is not captured.    Sporadic Epileptiform Discharges and Rhythmic and Periodic Patterns (RPPs):  Abundant paracentral spike/polyspike-wave discharges (shifting between C3, C4, Cz, or all of these). These most often occur in bursts and runs at 1-3 Hz, fluctuating in frequency, at times with intermixed fast activity, without clear evolution. These often have no clinical correlate. At times there is clinical correlate of myoclonic jerking as described below.    Electrographic and Electroclinical seizures:  Paracentral focal electroclinical myoclonic seizures:  Intermittent jerking of abdomen and left or bilateral lower extremities is present - jerking correlates with the above-described paracentral discharges. The patient is awake and makes purposeful movements during jerking episodes.  The patient's usual awake background is seen immediately before and after jerking episodes, and also during episodes in between muscle artifact and epileptiform discharges.    Other Clinical Events:  None    Activation Procedures:   Hyperventilation was not performed.    Photic stimulation was not performed.    Artifacts:  Intermittent myogenic and movement artifacts are present.    EKG:  Single-lead EKG shows regular rhythm at 60-70 bpm, at times with irregular rhythm (possibly PACs).    EEG Classification / Summary:  Abnormal EEG in the awake and drowsy states due to:  -Paracentral myoclonic seizures - clinical correlate of jerking of abdomen and left or bilateral lower extremities. The patient is awake and makes purposeful movements during jerking episodes.  -Abundant paracentral spike/polyspike-wave discharges most often occurring in bursts and runs without clear evolution or clinical correlate  -Rare right hemispheric focal slowing  -Mild diffuse slowing    Clinical Impression:  -These findings indicate a risk of further myoclonic seizures from the paracentral regions.  -Rare right hemispheric focal slowing indicates focal cerebral dysfunction in this region, which can be structural or functional in etiology.  -Mild diffuse slowing indicates mild diffuse cerebral dysfunction of nonspecific etiology.    Single-lead EKG incidentally shows irregular rhythm at times (possibly PACs).    The patient is already connected to video-EEG monitoring.      -------------------------------------------------------------------------------------------------------  [facility] EEG Reading Room Ph#: [phone]  Epilepsy Answering Service after 5PM and before 8:30AM: Ph#: [phone]    Viktoriya Nieves MD  Attending Physician, [facility]

## 2023-01-18 NOTE — PROGRESS NOTE ADULT - SUBJECTIVE AND OBJECTIVE BOX
BRANDON CAMARILLO    [address]    Allergies    No Known Allergies    Intolerances        PAST MEDICAL & SURGICAL HISTORY:  HTN (hypertension)      HLD (hyperlipidemia)      Diabetes mellitus      Lacunar infarction      BPH (benign prostatic hyperplasia)      CHF (congestive heart failure)      Chronic obstructive pulmonary disease, unspecified COPD type      S/P CABG (coronary artery bypass graft)  2014          FAMILY HISTORY:      Home Medications:  aspirin 81 mg oral delayed release tablet: 1 tab(s) orally once a day (12 Jun 2020 17:35)  Liquifilm Tears preserved ophthalmic solution: 1 drop(s) to each affected eye 2 times a day (12 Jun 2020 17:35)      MEDICATIONS  (STANDING):  albuterol/ipratropium for Nebulization 3 milliLiter(s) Nebulizer every 6 hours  apixaban 5 milliGRAM(s) Oral every 12 hours  aspirin  chewable 81 milliGRAM(s) Oral daily  atorvastatin 20 milliGRAM(s) Oral at bedtime  bacitracin   Ointment 1 Application(s) Topical two times a day  budesonide 160 MICROgram(s)/formoterol 4.5 MICROgram(s) Inhaler 2 Puff(s) Inhalation two times a day  cefTRIAXone   IVPB 1000 milliGRAM(s) IV Intermittent every 24 hours  chlorhexidine 0.12% Liquid 15 milliLiter(s) Oral Mucosa every 12 hours  chlorhexidine 2% Cloths 1 Application(s) Topical <User Schedule>  collagenase Ointment 1 Application(s) Topical two times a day  cyanocobalamin 1000 MICROGram(s) Oral daily  diphenhydrAMINE Injectable 50 milliGRAM(s) IV Push once  folic acid 1 milliGRAM(s) Oral daily  glycopyrrolate 1 milliGRAM(s) Oral two times a day  insulin glargine Injectable (LANTUS) 20 Unit(s) SubCutaneous every morning  insulin lispro (ADMELOG) corrective regimen sliding scale   SubCutaneous every 6 hours  levETIRAcetam 1500 milliGRAM(s) Oral two times a day  metoprolol tartrate 25 milliGRAM(s) Oral two times a day  polyethylene glycol 3350 17 Gram(s) Oral daily  saline laxative (FLEET) Rectal Enema 1 Enema Rectal once  scopolamine 1 mG/72 Hr(s) Patch 1 Patch Transdermal every 72 hours  senna 2 Tablet(s) Oral at bedtime  silver sulfADIAZINE 1% Cream 1 Application(s) Topical two times a day  valproic  acid Syrup 250 milliGRAM(s) Oral <User Schedule>    MEDICATIONS  (PRN):  acetaminophen     Tablet .. 650 milliGRAM(s) Oral every 6 hours PRN Temp greater or equal to 38C (100.4F), Mild Pain (1 - 3)  albuterol    90 MICROgram(s) HFA Inhaler 2 Puff(s) Inhalation every 6 hours PRN Shortness of Breath and/or Wheezing  aluminum hydroxide/magnesium hydroxide/simethicone Suspension 30 milliLiter(s) Oral every 4 hours PRN Dyspepsia  oxycodone    5 mG/acetaminophen 325 mG 1 Tablet(s) Oral every 4 hours PRN Moderate Pain (4 - 6)      Diet, NPO with Tube Feed:   Tube Feeding Modality: Gastrostomy  Glucerna 1.2 Pedrito  Total Volume for 24 Hours (mL): 1440  Continuous  Until Goal Tube Feed Rate (mL per Hour): 60  Tube Feed Duration (in Hours): 24  Tube Feed Start Time: 14:30  Free Water Flush  Free Water Flush Instructions:  30 ml/hr via pump  Liquid Protein Supplement     Qty per Day:  1 (01-09-23 @ 13:45) [Active]          Vital Signs Last 24 Hrs  T(C): 36.7 (18 Jan 2023 05:06), Max: 38.3 (17 Jan 2023 10:42)  T(F): 98 (18 Jan 2023 05:06), Max: 101 (17 Jan 2023 10:42)  HR: 74 (18 Jan 2023 06:07) (63 - 95)  BP: 103/62 (18 Jan 2023 05:06) (103/62 - 114/74)  BP(mean): --  RR: 18 (18 Jan 2023 05:06) (18 - 19)  SpO2: 100% (18 Jan 2023 06:07) (98% - 100%)    Parameters below as of 18 Jan 2023 06:07  Patient On (Oxygen Delivery Method): ventilator          01-17-23 @ 07:01  -  01-18-23 @ 07:00  --------------------------------------------------------  IN: 1080 mL / OUT: 0 mL / NET: 1080 mL        Mode: AC/ CMV (Assist Control/ Continuous Mandatory Ventilation), RR (machine): 14, TV (machine): 450, FiO2: 35, PEEP: 5, ITime: 0.8, MAP: 11, PIP: 25      LABS:                        10.0   8.38  )-----------( 265      ( 17 Jan 2023 06:35 )             31.9     01-17    144  |  107  |  33<H>  ----------------------------<  149<H>  4.1   |  29  |  1.12    Ca    8.9      17 Jan 2023 06:35                WBC:  WBC Count: 8.38 K/uL (01-17 @ 06:35)      MICROBIOLOGY:  RECENT CULTURES:  01-13 Trach Asp Tracheal Aspirate Citrobacter koseri   Moderate polymorphonuclear leukocytes per low power field  Rare Squamous epithelial cells per low power field  Moderate Gram Negative Rods seen per oil power field  Moderate Gram positive cocci in pairs seen per oil power field   Moderate Citrobacter koseri  Normal Respiratory Larissa present    01-13 .Blood Blood XXXX XXXX   No growth to date.                    Sodium:  Sodium, Serum: 144 mmol/L (01-17 @ 06:35)      1.12 mg/dL 01-17 @ 06:35      Hemoglobin:  Hemoglobin: 10.0 g/dL (01-17 @ 06:35)      Platelets: Platelet Count - Automated: 265 K/uL (01-17 @ 06:35)              RADIOLOGY & ADDITIONAL STUDIES:      MICROBIOLOGY:  RECENT CULTURES:  01-13 Trach Asp Tracheal Aspirate Citrobacter koseri   Moderate polymorphonuclear leukocytes per low power field  Rare Squamous epithelial cells per low power field  Moderate Gram Negative Rods seen per oil power field  Moderate Gram positive cocci in pairs seen per oil power field   Moderate Citrobacter koseri  Normal Respiratory Larissa present    01-13 .Blood Blood XXXX XXXX   No growth to date.

## 2023-01-18 NOTE — PROGRESS NOTE ADULT - ASSESSMENT
REVIEW OF SYMPTOMS      Able to give (reliable) ROS  NO     PHYSICAL EXAM    HEENT Unremarkable  atraumatic   RESP Fair air entry EXP prolonged    Harsh breath sound Resp distres mild   CARDIAC S1 S2 No S3     NO JVD    ABDOMEN SOFT BS PRESENT NOT DISTENDED No hepatosplenomegaly   PEDAL EDEMA present No calf tenderness  NO rash       GENERAL DATA .   GOC.  12/11/2022 full code       ALLGY.  nka                            WT. ..  12/2/2022 86  BMI. ..    12/2/2022 29                          ICU STAY.  .. 11/29-12/9  COVID.   .. 12/2/2022 scv2 (-)   .. 11/20/2022 scv2 (-)   BEST PRACTICE ISSUES.    HOB ELEVATN. Yes  DVT PPLX.    .. 1/3/2023 apixa 5.2 (vte)  12/12 l Subclav throm  ALEJO PPLX.   ..      INFN PPLX.   .. 11/25 chlorhex .12%   .. 11/14 chlorhex 2%   SP SW ANTWAN.         DIET.    ..  12/15 glucerna 1.2 1440 gt   IV fl.  ..            PROCEDURES.  .. 12/19 debridement of sacral wound   .. 12/5/2022 trach  .. 12/5/2022 peg   .. 12/12 r arm midline       ABGS.  1/6/2023 ac 16/450/.3/5 746/49/123     VS/ PO/IO/ VENT/ DRIPS.  1/18/2023 99 80 100/60   1/18/2023 tc 35%     PATIENT PRESENTATION.  72 m doa 11/14/2022 cac    PMH  PMH CAD s/p PCI with stent 2015 and CABG 2014,   PMH  s/p medtronic PPM,   PMH CVA (lacunar infarct)    HOSPITAL COURSE   .. 1) PEA arrest with ROSC after approximately 5 min 11/14  Now communicative   .. 2) DVT 12/12 l Subclav thromS 12/15/2022 lvnx 80.2 1/3 changed to apixaban 5.2  .. 3) TRACH 12/5/2022 trach  .. 4) PEG12/5/2022 peg   .. 5) Cellulitis hand absc 12/13 mod enterococcus fecalis  staph epi 12/12-12/15/2022  cephalexin 12/15 vanco once  12/16-12/19 zosyn  .. 6) Vent weaning     PROBLEM ASSESSMENT RECOMMENDATIONS.  WEANING   .. wean as tolerated  .. 1/4/2022  If able to tolerate cpap 5/5 x 2 h will place on tc   .. 1/5/2023 RICKY Valdovinos Tried on 35% tc   .. 1/6/2023 DW RT Pt to be on monitor during weaning trials and to be placed on vent at night and trach collar during daytime as tolerated   .. 1/7/2023 above reinforced to RT   .. 1/8/2023 placed on trach collar but has lot of secretns lasted 45 min   1/11/2023 tolerated tc several h  1/15/2023 Has been tolerating day time trach collar   TRACH LEAK  .. 1/9/2023 Notified by Hospitalist that pt is losing volunmes and that she is calling surgery to see if trach balloon has leak  HEMOPTYSIS  .. 1/13/2023 Pt having mild hemoptysis last 2 d   .. w 1/13-1/14/2023 w 10.4- 8.6   .. Hb 1/13/2023 Hb 9.7   .. Cr 1/13/2023 Cr .9   .. ct 1/13/2023 ct ch cw 1/4/2020 Stable bectasis and cystic changes basal l lower lobe and lingula Stable ssa r base   .. 1/13/2023 Hemoptysis likely sec to suctioning trauma in setting of full dose ac   .. 1/13/2023 VTE unlikely as cause of ac as pt is on fd ac   .. 1/13/2023 was eval by surgrey no trach bleed as ricky Arzate   .. 1/13/2023 infection is possibe cause sergio given bectasis so if persists will consider abio   INFECTION.  .. w 1/17-1/18/2023 w 8.3- 7.9    .. pr 1/17/2023 (-)   .. 1/16/2023 Rocephin started by hospitalist  CHF   .. 11/14/2022 echo mod decr segmental lvsf apical lateral apiccal ant segment are abn takotsubo cmpthy pasp 42 .  .. Monitor for chf has chr hfref per echo   COPD   .. 12/30 symbicort 160   .. 1/7/2023 glycopyrrolate 1.2   .. 1/9/2023 ipratropium  .. 1/15/2023 scopolamine   .. continue bd ics for copd   Anemia.  .. Hb 1/12-1/14/2023 Hb 9.8- 10   .. target hb 7 (+)  .. monitor   sp cac   .. good neuro recovery awake alert interactive   VTE  .. 1/3/2023 apixa 5.2 (vte)      OVERALL   WEAN as told   HEMOPTYSIS Consider abio if persists  TRACH LEAK If persists trach ch  ROCEPHIN Started 1/16/2023 by hospitalist would limit to 5 d course   FLAILING OF LOWER EXTR started 1/16 on depakote   suggest Neuro followup      TIME SPENT   Over 25 minutes aggregate care time spent on encounter; activities included   direct patient care, counseling and/or coordinating care reviewing notes, lab data/ imaging , discussion with multidisciplinary team/ patient  /family and explaining in detail risks, benefits, alternatives  of the recommendations     BRANDON CAMARILLO

## 2023-01-18 NOTE — CHART NOTE - NSCHARTNOTEFT_GEN_A_CORE
Neurology Chart Note    Patient has had significant clinical improvement, but continues to have intermittent myoclonic seizures, Depakote added yesterday with improvement.  Recommend increasing Depakote to 500mg BID.  Continue Keppra 1500mg BID.  Continue VEEG monitoring.    Mark Loo MD  Neurology Attending Physician

## 2023-01-19 LAB
ALBUMIN SERPL ELPH-MCNC: 2.3 G/DL — LOW (ref 3.3–5)
ALP SERPL-CCNC: 80 U/L — SIGNIFICANT CHANGE UP (ref 40–120)
ALT FLD-CCNC: 18 U/L — SIGNIFICANT CHANGE UP (ref 12–78)
ANION GAP SERPL CALC-SCNC: 7 MMOL/L — SIGNIFICANT CHANGE UP (ref 5–17)
AST SERPL-CCNC: 9 U/L — LOW (ref 15–37)
BILIRUB SERPL-MCNC: 0.3 MG/DL — SIGNIFICANT CHANGE UP (ref 0.2–1.2)
BUN SERPL-MCNC: 32 MG/DL — HIGH (ref 7–23)
CALCIUM SERPL-MCNC: 8.9 MG/DL — SIGNIFICANT CHANGE UP (ref 8.5–10.1)
CHLORIDE SERPL-SCNC: 109 MMOL/L — HIGH (ref 96–108)
CO2 SERPL-SCNC: 29 MMOL/L — SIGNIFICANT CHANGE UP (ref 22–31)
CREAT SERPL-MCNC: 0.99 MG/DL — SIGNIFICANT CHANGE UP (ref 0.5–1.3)
EGFR: 80 ML/MIN/1.73M2 — SIGNIFICANT CHANGE UP
FLUAV AG NPH QL: SIGNIFICANT CHANGE UP
FLUBV AG NPH QL: SIGNIFICANT CHANGE UP
GLUCOSE BLDC GLUCOMTR-MCNC: 107 MG/DL — HIGH (ref 70–99)
GLUCOSE BLDC GLUCOMTR-MCNC: 111 MG/DL — HIGH (ref 70–99)
GLUCOSE BLDC GLUCOMTR-MCNC: 126 MG/DL — HIGH (ref 70–99)
GLUCOSE BLDC GLUCOMTR-MCNC: 131 MG/DL — HIGH (ref 70–99)
GLUCOSE BLDC GLUCOMTR-MCNC: 147 MG/DL — HIGH (ref 70–99)
GLUCOSE BLDC GLUCOMTR-MCNC: 168 MG/DL — HIGH (ref 70–99)
GLUCOSE SERPL-MCNC: 132 MG/DL — HIGH (ref 70–99)
HCT VFR BLD CALC: 29.7 % — LOW (ref 39–50)
HGB BLD-MCNC: 9.1 G/DL — LOW (ref 13–17)
MCHC RBC-ENTMCNC: 30 PG — SIGNIFICANT CHANGE UP (ref 27–34)
MCHC RBC-ENTMCNC: 30.6 G/DL — LOW (ref 32–36)
MCV RBC AUTO: 98 FL — SIGNIFICANT CHANGE UP (ref 80–100)
NRBC # BLD: 0 /100 WBCS — SIGNIFICANT CHANGE UP (ref 0–0)
PLATELET # BLD AUTO: 260 K/UL — SIGNIFICANT CHANGE UP (ref 150–400)
POTASSIUM SERPL-MCNC: 3.9 MMOL/L — SIGNIFICANT CHANGE UP (ref 3.5–5.3)
POTASSIUM SERPL-SCNC: 3.9 MMOL/L — SIGNIFICANT CHANGE UP (ref 3.5–5.3)
PROCALCITONIN SERPL-MCNC: 0.07 NG/ML — SIGNIFICANT CHANGE UP (ref 0.02–0.1)
PROT SERPL-MCNC: 6.4 GM/DL — SIGNIFICANT CHANGE UP (ref 6–8.3)
RBC # BLD: 3.03 M/UL — LOW (ref 4.2–5.8)
RBC # FLD: 14.7 % — HIGH (ref 10.3–14.5)
SARS-COV-2 RNA SPEC QL NAA+PROBE: SIGNIFICANT CHANGE UP
SODIUM SERPL-SCNC: 145 MMOL/L — SIGNIFICANT CHANGE UP (ref 135–145)
WBC # BLD: 7.99 K/UL — SIGNIFICANT CHANGE UP (ref 3.8–10.5)
WBC # FLD AUTO: 7.99 K/UL — SIGNIFICANT CHANGE UP (ref 3.8–10.5)

## 2023-01-19 PROCEDURE — 99232 SBSQ HOSP IP/OBS MODERATE 35: CPT

## 2023-01-19 RX ADMIN — Medication 1 APPLICATION(S): at 17:08

## 2023-01-19 RX ADMIN — CHLORHEXIDINE GLUCONATE 1 APPLICATION(S): 213 SOLUTION TOPICAL at 05:04

## 2023-01-19 RX ADMIN — POLYETHYLENE GLYCOL 3350 17 GRAM(S): 17 POWDER, FOR SOLUTION ORAL at 11:48

## 2023-01-19 RX ADMIN — SCOPALAMINE 1 PATCH: 1 PATCH, EXTENDED RELEASE TRANSDERMAL at 08:00

## 2023-01-19 RX ADMIN — BUDESONIDE AND FORMOTEROL FUMARATE DIHYDRATE 2 PUFF(S): 160; 4.5 AEROSOL RESPIRATORY (INHALATION) at 17:26

## 2023-01-19 RX ADMIN — Medication 3 MILLILITER(S): at 05:20

## 2023-01-19 RX ADMIN — APIXABAN 5 MILLIGRAM(S): 2.5 TABLET, FILM COATED ORAL at 17:10

## 2023-01-19 RX ADMIN — SCOPALAMINE 1 PATCH: 1 PATCH, EXTENDED RELEASE TRANSDERMAL at 00:32

## 2023-01-19 RX ADMIN — BUDESONIDE AND FORMOTEROL FUMARATE DIHYDRATE 2 PUFF(S): 160; 4.5 AEROSOL RESPIRATORY (INHALATION) at 05:21

## 2023-01-19 RX ADMIN — CHLORHEXIDINE GLUCONATE 15 MILLILITER(S): 213 SOLUTION TOPICAL at 05:03

## 2023-01-19 RX ADMIN — Medication 1 APPLICATION(S): at 17:07

## 2023-01-19 RX ADMIN — Medication 81 MILLIGRAM(S): at 11:47

## 2023-01-19 RX ADMIN — PREGABALIN 1000 MICROGRAM(S): 225 CAPSULE ORAL at 11:47

## 2023-01-19 RX ADMIN — Medication 3 MILLILITER(S): at 00:05

## 2023-01-19 RX ADMIN — Medication 2: at 11:48

## 2023-01-19 RX ADMIN — APIXABAN 5 MILLIGRAM(S): 2.5 TABLET, FILM COATED ORAL at 05:04

## 2023-01-19 RX ADMIN — Medication 1 APPLICATION(S): at 05:07

## 2023-01-19 RX ADMIN — LEVETIRACETAM 1500 MILLIGRAM(S): 250 TABLET, FILM COATED ORAL at 05:03

## 2023-01-19 RX ADMIN — Medication 3 MILLILITER(S): at 11:17

## 2023-01-19 RX ADMIN — Medication 1 APPLICATION(S): at 05:06

## 2023-01-19 RX ADMIN — Medication 650 MILLIGRAM(S): at 14:10

## 2023-01-19 RX ADMIN — Medication 500 MILLIGRAM(S): at 08:14

## 2023-01-19 RX ADMIN — CEFTRIAXONE 100 MILLIGRAM(S): 500 INJECTION, POWDER, FOR SOLUTION INTRAMUSCULAR; INTRAVENOUS at 10:48

## 2023-01-19 RX ADMIN — CHLORHEXIDINE GLUCONATE 15 MILLILITER(S): 213 SOLUTION TOPICAL at 17:11

## 2023-01-19 RX ADMIN — ATORVASTATIN CALCIUM 20 MILLIGRAM(S): 80 TABLET, FILM COATED ORAL at 21:02

## 2023-01-19 RX ADMIN — Medication 3 MILLILITER(S): at 17:25

## 2023-01-19 RX ADMIN — Medication 650 MILLIGRAM(S): at 11:47

## 2023-01-19 RX ADMIN — INSULIN GLARGINE 20 UNIT(S): 100 INJECTION, SOLUTION SUBCUTANEOUS at 08:14

## 2023-01-19 RX ADMIN — Medication 1 MILLIGRAM(S): at 11:47

## 2023-01-19 RX ADMIN — ROBINUL 1 MILLIGRAM(S): 0.2 INJECTION INTRAMUSCULAR; INTRAVENOUS at 05:05

## 2023-01-19 RX ADMIN — LEVETIRACETAM 1500 MILLIGRAM(S): 250 TABLET, FILM COATED ORAL at 17:12

## 2023-01-19 RX ADMIN — SENNA PLUS 2 TABLET(S): 8.6 TABLET ORAL at 21:03

## 2023-01-19 RX ADMIN — Medication 25 MILLIGRAM(S): at 05:04

## 2023-01-19 RX ADMIN — ROBINUL 1 MILLIGRAM(S): 0.2 INJECTION INTRAMUSCULAR; INTRAVENOUS at 17:10

## 2023-01-19 RX ADMIN — Medication 500 MILLIGRAM(S): at 21:03

## 2023-01-19 NOTE — PROGRESS NOTE ADULT - SUBJECTIVE AND OBJECTIVE BOX
BRANDON CAMARILLO    [address]    Allergies    No Known Allergies    Intolerances        PAST MEDICAL & SURGICAL HISTORY:  HTN (hypertension)      HLD (hyperlipidemia)      Diabetes mellitus      Lacunar infarction      BPH (benign prostatic hyperplasia)      CHF (congestive heart failure)      Chronic obstructive pulmonary disease, unspecified COPD type      S/P CABG (coronary artery bypass graft)  2014          FAMILY HISTORY:      Home Medications:  aspirin 81 mg oral delayed release tablet: 1 tab(s) orally once a day (12 Jun 2020 17:35)  Liquifilm Tears preserved ophthalmic solution: 1 drop(s) to each affected eye 2 times a day (12 Jun 2020 17:35)      MEDICATIONS  (STANDING):  albuterol/ipratropium for Nebulization 3 milliLiter(s) Nebulizer every 6 hours  apixaban 5 milliGRAM(s) Oral every 12 hours  aspirin  chewable 81 milliGRAM(s) Oral daily  atorvastatin 20 milliGRAM(s) Oral at bedtime  bacitracin   Ointment 1 Application(s) Topical two times a day  budesonide 160 MICROgram(s)/formoterol 4.5 MICROgram(s) Inhaler 2 Puff(s) Inhalation two times a day  cefTRIAXone   IVPB 1000 milliGRAM(s) IV Intermittent every 24 hours  chlorhexidine 0.12% Liquid 15 milliLiter(s) Oral Mucosa every 12 hours  chlorhexidine 2% Cloths 1 Application(s) Topical <User Schedule>  collagenase Ointment 1 Application(s) Topical two times a day  cyanocobalamin 1000 MICROGram(s) Oral daily  diphenhydrAMINE Injectable 50 milliGRAM(s) IV Push once  folic acid 1 milliGRAM(s) Oral daily  glycopyrrolate 1 milliGRAM(s) Oral two times a day  insulin glargine Injectable (LANTUS) 20 Unit(s) SubCutaneous every morning  insulin lispro (ADMELOG) corrective regimen sliding scale   SubCutaneous every 6 hours  levETIRAcetam 1500 milliGRAM(s) Oral two times a day  metoprolol tartrate 25 milliGRAM(s) Oral two times a day  polyethylene glycol 3350 17 Gram(s) Oral daily  saline laxative (FLEET) Rectal Enema 1 Enema Rectal once  scopolamine 1 mG/72 Hr(s) Patch 1 Patch Transdermal every 72 hours  senna 2 Tablet(s) Oral at bedtime  silver sulfADIAZINE 1% Cream 1 Application(s) Topical two times a day  valproic  acid Syrup 500 milliGRAM(s) Oral <User Schedule>    MEDICATIONS  (PRN):  acetaminophen     Tablet .. 650 milliGRAM(s) Oral every 6 hours PRN Temp greater or equal to 38C (100.4F), Mild Pain (1 - 3)  albuterol    90 MICROgram(s) HFA Inhaler 2 Puff(s) Inhalation every 6 hours PRN Shortness of Breath and/or Wheezing  aluminum hydroxide/magnesium hydroxide/simethicone Suspension 30 milliLiter(s) Oral every 4 hours PRN Dyspepsia  oxycodone    5 mG/acetaminophen 325 mG 1 Tablet(s) Oral every 4 hours PRN Moderate Pain (4 - 6)      Diet, NPO with Tube Feed:   Tube Feeding Modality: Gastrostomy  Glucerna 1.2 Pedrito  Total Volume for 24 Hours (mL): 1440  Continuous  Until Goal Tube Feed Rate (mL per Hour): 60  Tube Feed Duration (in Hours): 24  Tube Feed Start Time: 14:30  Free Water Flush  Free Water Flush Instructions:  30 ml/hr via pump  Liquid Protein Supplement     Qty per Day:  1 (01-09-23 @ 13:45) [Active]          Vital Signs Last 24 Hrs  T(C): 36.8 (19 Jan 2023 04:36), Max: 37.6 (18 Jan 2023 23:32)  T(F): 98.3 (19 Jan 2023 04:36), Max: 99.6 (18 Jan 2023 23:32)  HR: 73 (19 Jan 2023 05:54) (65 - 88)  BP: 122/67 (19 Jan 2023 04:36) (103/61 - 122/67)  BP(mean): --  RR: 18 (19 Jan 2023 04:36) (18 - 18)  SpO2: 100% (19 Jan 2023 05:54) (96% - 100%)    Parameters below as of 19 Jan 2023 05:54  Patient On (Oxygen Delivery Method): ventilator          01-18-23 @ 07:01  -  01-19-23 @ 07:00  --------------------------------------------------------  IN: 1080 mL / OUT: 0 mL / NET: 1080 mL        Mode: AC/ CMV (Assist Control/ Continuous Mandatory Ventilation), RR (machine): 14, TV (machine): 450, FiO2: 35, PEEP: 5, ITime: 1, MAP: 9, PIP: 20      LABS:                        10.4   7.98  )-----------( 231      ( 18 Jan 2023 07:28 )             33.4                     WBC:  WBC Count: 7.98 K/uL (01-18 @ 07:28)  WBC Count: 8.38 K/uL (01-17 @ 06:35)      MICROBIOLOGY:  RECENT CULTURES:  01-13 Trach Asp Tracheal Aspirate Citrobacter koseri   Moderate polymorphonuclear leukocytes per low power field  Rare Squamous epithelial cells per low power field  Moderate Gram Negative Rods seen per oil power field  Moderate Gram positive cocci in pairs seen per oil power field   Moderate Citrobacter koseri  Normal Respiratory Larissa present    01-13 .Blood Blood XXXX XXXX   No Growth Final                    Sodium:  Sodium, Serum: 144 mmol/L (01-17 @ 06:35)      1.12 mg/dL 01-17 @ 06:35      Hemoglobin:  Hemoglobin: 10.4 g/dL (01-18 @ 07:28)  Hemoglobin: 10.0 g/dL (01-17 @ 06:35)      Platelets: Platelet Count - Automated: 231 K/uL (01-18 @ 07:28)  Platelet Count - Automated: 265 K/uL (01-17 @ 06:35)              RADIOLOGY & ADDITIONAL STUDIES:      MICROBIOLOGY:  RECENT CULTURES:  01-13 Trach Asp Tracheal Aspirate Citrobacter koseri   Moderate polymorphonuclear leukocytes per low power field  Rare Squamous epithelial cells per low power field  Moderate Gram Negative Rods seen per oil power field  Moderate Gram positive cocci in pairs seen per oil power field   Moderate Citrobacter koseri  Normal Respiratory Larissa present    01-13 .Blood Blood XXXX XXXX   No Growth Final

## 2023-01-19 NOTE — EEG REPORT - NS EEG TEXT BOX
BRANDON CAMARILLO [id number]     Study Date: 01/18/23 10:24 - 01/19/23 08:00  Duration: 21 h 36 min  --------------------------------------------------------------------------------------------------  History:  CC/ HPI Patient is a 72y old  Male who presents with a chief complaint of s/p cardiac arrest, hypoglycemia (19 Jan 2023 08:12)    MEDICATIONS  (STANDING):  albuterol/ipratropium for Nebulization 3 milliLiter(s) Nebulizer every 6 hours  apixaban 5 milliGRAM(s) Oral every 12 hours  aspirin  chewable 81 milliGRAM(s) Oral daily  atorvastatin 20 milliGRAM(s) Oral at bedtime  bacitracin   Ointment 1 Application(s) Topical two times a day  budesonide 160 MICROgram(s)/formoterol 4.5 MICROgram(s) Inhaler 2 Puff(s) Inhalation two times a day  cefTRIAXone   IVPB 1000 milliGRAM(s) IV Intermittent every 24 hours  chlorhexidine 0.12% Liquid 15 milliLiter(s) Oral Mucosa every 12 hours  chlorhexidine 2% Cloths 1 Application(s) Topical <User Schedule>  collagenase Ointment 1 Application(s) Topical two times a day  cyanocobalamin 1000 MICROGram(s) Oral daily  diphenhydrAMINE Injectable 50 milliGRAM(s) IV Push once  folic acid 1 milliGRAM(s) Oral daily  glycopyrrolate 1 milliGRAM(s) Oral two times a day  insulin glargine Injectable (LANTUS) 20 Unit(s) SubCutaneous every morning  insulin lispro (ADMELOG) corrective regimen sliding scale   SubCutaneous every 6 hours  levETIRAcetam 1500 milliGRAM(s) Oral two times a day  metoprolol tartrate 25 milliGRAM(s) Oral two times a day  polyethylene glycol 3350 17 Gram(s) Oral daily  saline laxative (FLEET) Rectal Enema 1 Enema Rectal once  scopolamine 1 mG/72 Hr(s) Patch 1 Patch Transdermal every 72 hours  senna 2 Tablet(s) Oral at bedtime  silver sulfADIAZINE 1% Cream 1 Application(s) Topical two times a day  valproic  acid Syrup 500 milliGRAM(s) Oral <User Schedule>    --------------------------------------------------------------------------------------------------  Study Interpretation:    [[[Abbreviation Key:  PDR=alpha rhythm/posterior dominant rhythm. A-P=anterior posterior.  Amplitude: ‘very low’:<20; ‘low’:20-49; ‘medium’:; ‘high’:>150uV.  Persistence for periodic/rhythmic patterns (% of epoch) ‘rare’:<1%; ‘occasional’:1-10%; ‘frequent’:10-50%; ‘abundant’:50-90%; ‘continuous’:>90%.  Persistence for sporadic discharges: ‘rare’:<1/hr; ‘occasional’:1/min-1/hr; ‘frequent’:>1/min; ‘abundant’:>1/10 sec.  RPP=rhythmic and periodic patterns; GRDA=generalized rhythmic delta activity; FIRDA=frontal intermittent GRDA; LRDA=lateralized rhythmic delta activity; TIRDA=temporal intermittent rhythmic delta activity;  LPD=PLED=lateralized periodic discharges; GPD=generalized periodic discharges; BIPDs =bilateral independent periodic discharges; Mf=multifocal; SIRPDs=stimulus induced rhythmic, periodic, or ictal appearing discharges; BIRDs=brief potentially ictal rhythmic discharges >4 Hz, lasting .5-10s; PFA (paroxysmal bursts >13 Hz or =8 Hz <10s).  Modifiers: +F=with fast component; +S=with spike component; +R=with rhythmic component.  S-B=burst suppression pattern.  Max=maximal. N1-drowsy; N2-stage II sleep; N3-slow wave sleep. SSS/BETS=small sharp spikes/benign epileptiform transients of sleep. HV=hyperventilation; PS=photic stimulation]]]    Daily EEG Visual Analysis    FINDINGS:      Background:  Continuity: Continuous  Symmetry: Symmetric  Posterior dominant rhythm (PDR): 8.5 Hz, reactive to eye closure. Symmetric low-amplitude frontal beta in wakefulness.  State Change: Present  Voltage: Normal  Anterior Posterior Gradient: Present  Other background findings: None  Breach: Absent    Background Slowing:  Generalized slowing: None  Focal slowing: None    State Changes:   Drowsiness is characterized by fragmentation, attenuation, and slowing of the background frequencies.  Stage 2 sleep is characterized by symmetric vertex waves, rare K complexes and symmetric rudimentary sleep spindles.     Sporadic Epileptiform Discharges and Rhythmic and Periodic Patterns (RPPs):  Frequent central (Cz, at times involving C3 and/or C4) polyspike-wave discharges occurring in bursts and runs at 1-5 Hz without clear evolution and without clinical correlate.    Electrographic and Electroclinical seizures:  Frequent myoclonic seizures: jerking of abdomen, BLE (such as b/l hip adduction jerks), abdomen, and/or trunk correlating with central polyspike-wave discharges (Cz, at times involving C3 and/or C4). The patient is awake and making purposeful movements during jerking episodes. Occasionally, seizures are electrographic (no clinical correlate), such as during drowsiness.    Other Clinical Events:  None    Activation Procedures:   Hyperventilation was not performed.    Photic stimulation was not performed.    Artifacts:  Intermittent myogenic and movement artifacts are present.    EKG:  Single-lead EKG shows regular rhythm at 60-80 bpm with PVCs.    EEG Classification / Summary:  Abnormal EEG in the awake, drowsy, and asleep states due to:  -Frequent myoclonic seizures from the central region, occasionally electrographic (without clinical correlate) such as during drowsiness. The patient is awake and making purposeful movements during jerking episodes.  -Frequent bursts and runs of central polyspike-wave discharges without clear evolution and without clinical correlate    Clinical Impression:  These findings indicate a risk of further myoclonic seizures from the central region.      -------------------------------------------------------------------------------------------------------  [facility] EEG Reading Room Ph#: [phone]  Epilepsy Answering Service after 5PM and before 8:30AM: Ph#: [phone]    Viktoriya Nieves MD  Attending Physician, [facility] [place]

## 2023-01-19 NOTE — PROGRESS NOTE ADULT - ASSESSMENT
REVIEW OF SYMPTOMS      Able to give (reliable) ROS  NO     PHYSICAL EXAM    HEENT Unremarkable  atraumatic   RESP Fair air entry EXP prolonged    Harsh breath sound Resp distres mild   CARDIAC S1 S2 No S3     NO JVD    ABDOMEN SOFT BS PRESENT NOT DISTENDED No hepatosplenomegaly   PEDAL EDEMA present No calf tenderness  NO rash       GENERAL DATA .   GOC.  12/11/2022 full code       ALLGY.  nka                            WT. ..  12/2/2022 86  BMI. ..    12/2/2022 29                          ICU STAY.  .. 11/29-12/9  COVID.   .. 12/2/2022 scv2 (-)   .. 11/20/2022 scv2 (-)   BEST PRACTICE ISSUES.    HOB ELEVATN. Yes  DVT PPLX.    .. 1/3/2023 apixa 5.2 (vte)  12/12 l Subclav throm  ALEJO PPLX.   ..      INFN PPLX.   .. 11/25 chlorhex .12%   .. 11/14 chlorhex 2%   SP SW ANTWAN.         DIET.    ..  12/15 glucerna 1.2 1440 gt   IV fl.  ..            PROCEDURES.  .. 12/19 debridement of sacral wound   .. 12/5/2022 trach  .. 12/5/2022 peg   .. 12/12 r arm midline       ABGS.  1/6/2023 ac 16/450/.3/5 746/49/123     VS/ PO/IO/ VENT/ DRIPS.  1/19/2023 afeb 70 100/50   1/19/2023 tc 35%     PATIENT PRESENTATION.  72 m doa 11/14/2022 cac    PMH  PMH CAD s/p PCI with stent 2015 and CABG 2014,   PMH  s/p medtronic PPM,   PMH CVA (lacunar infarct)    HOSPITAL COURSE   .. 1) PEA arrest with ROSC after approximately 5 min 11/14  Now communicative   .. 2) DVT 12/12 l Subclav thromS 12/15/2022 lvnx 80.2 1/3 changed to apixaban 5.2  .. 3) TRACH 12/5/2022 trach  .. 4) PEG12/5/2022 peg   .. 5) Cellulitis hand absc 12/13 mod enterococcus fecalis  staph epi 12/12-12/15/2022  cephalexin 12/15 vanco once  12/16-12/19 zosyn  .. 6) Vent weaning     PROBLEM ASSESSMENT RECOMMENDATIONS.  WEANING   .. wean as tolerated  .. 1/4/2022  If able to tolerate cpap 5/5 x 2 h will place on tc   .. 1/5/2023 RICKY Valdovinos Tried on 35% tc   .. 1/6/2023 DW RT Pt to be on monitor during weaning trials and to be placed on vent at night and trach collar during daytime as tolerated   .. 1/7/2023 above reinforced to RT   .. 1/8/2023 placed on trach collar but has lot of secretns lasted 45 min   1/11/2023 tolerated tc several h  1/15/2023 Has been tolerating day time trach collar   TRACH LEAK  .. 1/9/2023 Notified by Hospitalist that pt is losing volunmes and that she is calling surgery to see if trach balloon has leak  HEMOPTYSIS  .. 1/13/2023 Pt having mild hemoptysis last 2 d   .. w 1/13-1/14/2023 w 10.4- 8.6   .. Hb 1/13/2023 Hb 9.7   .. Cr 1/13/2023 Cr .9   .. ct 1/13/2023 ct ch cw 1/4/2020 Stable bectasis and cystic changes basal l lower lobe and lingula Stable ssa r base   .. 1/13/2023 Hemoptysis likely sec to suctioning trauma in setting of full dose ac   .. 1/13/2023 VTE unlikely as cause of ac as pt is on fd ac   .. 1/13/2023 was eval by surgrey no trach bleed as ricky Arzate   .. 1/13/2023 infection is possibe cause sergio given bectasis so if persists will consider abio   INFECTION.  .. w 1/17-1/18/2023 w 8.3- 7.9    .. pr 1/17/2023 (-)   .. 1/16/2023 Rocephin started by hospitalist  CHF   .. 11/14/2022 echo mod decr segmental lvsf apical lateral apiccal ant segment are abn takotsubo cmpthy pasp 42 .  .. Monitor for chf has chr hfref per echo   COPD   .. 12/30 symbicort 160   .. 1/7/2023 glycopyrrolate 1.2   .. 1/9/2023 ipratropium  .. 1/15/2023 scopolamine   .. continue bd ics for copd   Anemia.  .. Hb 1/12-1/14-1/19/2023 Hb 9.8- 10 -9.1   .. target hb 7 (+)  .. monitor   sp cac   .. good neuro recovery awake alert interactive   VTE  .. 1/3/2023 apixa 5.2 (vte)      OVERALL   WEAN as told   HEMOPTYSIS Consider abio if persists  TRACH LEAK If persists trach ch  ROCEPHIN Started 1/16/2023 by hospitalist would limit to 5 d course   FLAILING OF LOWER EXTR started 1/16 on depakote   1/19/2023 myoclonic jerks improvd       TIME SPENT   Over 25 minutes aggregate care time spent on encounter; activities included   direct patient care, counseling and/or coordinating care reviewing notes, lab data/ imaging , discussion with multidisciplinary team/ patient  /family and explaining in detail risks, benefits, alternatives  of the recommendations     BRANDON CAMARILLO

## 2023-01-19 NOTE — PROGRESS NOTE ADULT - SUBJECTIVE AND OBJECTIVE BOX
Patient is a 72y old  Male who presents with a chief complaint of s/p cardiac arrest, hypoglycemia (2023 08:12)    INTERVAL HPI/OVERNIGHT EVENTS: had fever of 102.   SUBJECTIVE: no complaints of pain/discomfort/dyspnea    MEDICATIONS  (STANDING):  albuterol/ipratropium for Nebulization 3 milliLiter(s) Nebulizer every 6 hours  apixaban 5 milliGRAM(s) Oral every 12 hours  aspirin  chewable 81 milliGRAM(s) Oral daily  atorvastatin 20 milliGRAM(s) Oral at bedtime  bacitracin   Ointment 1 Application(s) Topical two times a day  budesonide 160 MICROgram(s)/formoterol 4.5 MICROgram(s) Inhaler 2 Puff(s) Inhalation two times a day  cefTRIAXone   IVPB 1000 milliGRAM(s) IV Intermittent every 24 hours  chlorhexidine 0.12% Liquid 15 milliLiter(s) Oral Mucosa every 12 hours  chlorhexidine 2% Cloths 1 Application(s) Topical <User Schedule>  collagenase Ointment 1 Application(s) Topical two times a day  cyanocobalamin 1000 MICROGram(s) Oral daily  diphenhydrAMINE Injectable 50 milliGRAM(s) IV Push once  folic acid 1 milliGRAM(s) Oral daily  glycopyrrolate 1 milliGRAM(s) Oral two times a day  insulin glargine Injectable (LANTUS) 20 Unit(s) SubCutaneous every morning  insulin lispro (ADMELOG) corrective regimen sliding scale   SubCutaneous every 6 hours  levETIRAcetam 1500 milliGRAM(s) Oral two times a day  metoprolol tartrate 25 milliGRAM(s) Oral two times a day  polyethylene glycol 3350 17 Gram(s) Oral daily  saline laxative (FLEET) Rectal Enema 1 Enema Rectal once  scopolamine 1 mG/72 Hr(s) Patch 1 Patch Transdermal every 72 hours  senna 2 Tablet(s) Oral at bedtime  silver sulfADIAZINE 1% Cream 1 Application(s) Topical two times a day  valproic  acid Syrup 500 milliGRAM(s) Oral <User Schedule>    MEDICATIONS  (PRN):  acetaminophen     Tablet .. 650 milliGRAM(s) Oral every 6 hours PRN Temp greater or equal to 38C (100.4F), Mild Pain (1 - 3)  albuterol    90 MICROgram(s) HFA Inhaler 2 Puff(s) Inhalation every 6 hours PRN Shortness of Breath and/or Wheezing  aluminum hydroxide/magnesium hydroxide/simethicone Suspension 30 milliLiter(s) Oral every 4 hours PRN Dyspepsia  oxycodone    5 mG/acetaminophen 325 mG 1 Tablet(s) Oral every 4 hours PRN Moderate Pain (4 - 6)    Allergies    No Known Allergies    Intolerances      REVIEW OF SYSTEMS:  All other systems reviewed and are negative    Vital Signs Last 24 Hrs  T(C): 36.7 (2023 16:23), Max: 38 (2023 11:46)  T(F): 98.1 (2023 16:23), Max: 100.4 (2023 11:46)  HR: 73 (2023 16:23) (68 - 88)  BP: 108/58 (2023 16:23) (103/64 - 122/67)  BP(mean): --  RR: 18 (2023 16:23) (18 - 18)  SpO2: 99% (2023 16:23) (96% - 100%)    Parameters below as of 2023 11:38  Patient On (Oxygen Delivery Method): tracheostomy collar      Daily     Daily Weight in k.5 (2023 04:36)  I&O's Summary    2023 07:01  -  2023 07:00  --------------------------------------------------------  IN: 1080 mL / OUT: 0 mL / NET: 1080 mL      CAPILLARY BLOOD GLUCOSE      POCT Blood Glucose.: 107 mg/dL (2023 16:45)  POCT Blood Glucose.: 168 mg/dL (2023 11:27)  POCT Blood Glucose.: 131 mg/dL (2023 08:07)  POCT Blood Glucose.: 147 mg/dL (2023 06:28)  POCT Blood Glucose.: 111 mg/dL (2023 00:04)    PHYSICAL EXAM:  GENERAL: NAD, well-groomed, well-developed  HEAD:  Atraumatic, Normocephalic  EYES: EOMI, PERRLA, conjunctiva and sclera clear  ENMT: No tonsillar erythema, exudates, or enlargement; Moist mucous membranes, Good dentition, No lesions  NECK: Supple, No JVD, Normal thyroid  NERVOUS SYSTEM:  Alert & Oriented X3, Good concentration; able to follow direction   CHEST/LUNG: Clear to percussion bilaterally; No rales, rhonchi, wheezing, or rubs  HEART: Regular rate and rhythm; No murmurs, rubs, or gallops  ABDOMEN: Soft, Nontender, Nondistended; Bowel sounds present  EXTREMITIES:  2+ Peripheral Pulses, No clubbing, cyanosis, or edema  LYMPH: No lymphadenopathy noted  SKIN: No rashes or lesions  Lines: Peg, trach    Labs                          9.1    7.99  )-----------( 260      ( 2023 07:42 )             29.7         145  |  109<H>  |  32<H>  ----------------------------<  132<H>  3.9   |  29  |  0.99    Ca    8.9      2023 07:42    TPro  6.4  /  Alb  2.3<L>  /  TBili  0.3  /  DBili  x   /  AST  9<L>  /  ALT  18  /  AlkPhos  80                   Patient is a 72y old  Male who presents with a chief complaint of s/p cardiac arrest, hypoglycemia (2023 08:12)    INTERVAL HPI/OVERNIGHT EVENTS: had fever of 102. on rocephin day # 3  SUBJECTIVE: no complaints of pain/discomfort/dyspnea    MEDICATIONS  (STANDING):  albuterol/ipratropium for Nebulization 3 milliLiter(s) Nebulizer every 6 hours  apixaban 5 milliGRAM(s) Oral every 12 hours  aspirin  chewable 81 milliGRAM(s) Oral daily  atorvastatin 20 milliGRAM(s) Oral at bedtime  bacitracin   Ointment 1 Application(s) Topical two times a day  budesonide 160 MICROgram(s)/formoterol 4.5 MICROgram(s) Inhaler 2 Puff(s) Inhalation two times a day  cefTRIAXone   IVPB 1000 milliGRAM(s) IV Intermittent every 24 hours  chlorhexidine 0.12% Liquid 15 milliLiter(s) Oral Mucosa every 12 hours  chlorhexidine 2% Cloths 1 Application(s) Topical <User Schedule>  collagenase Ointment 1 Application(s) Topical two times a day  cyanocobalamin 1000 MICROGram(s) Oral daily  diphenhydrAMINE Injectable 50 milliGRAM(s) IV Push once  folic acid 1 milliGRAM(s) Oral daily  glycopyrrolate 1 milliGRAM(s) Oral two times a day  insulin glargine Injectable (LANTUS) 20 Unit(s) SubCutaneous every morning  insulin lispro (ADMELOG) corrective regimen sliding scale   SubCutaneous every 6 hours  levETIRAcetam 1500 milliGRAM(s) Oral two times a day  metoprolol tartrate 25 milliGRAM(s) Oral two times a day  polyethylene glycol 3350 17 Gram(s) Oral daily  saline laxative (FLEET) Rectal Enema 1 Enema Rectal once  scopolamine 1 mG/72 Hr(s) Patch 1 Patch Transdermal every 72 hours  senna 2 Tablet(s) Oral at bedtime  silver sulfADIAZINE 1% Cream 1 Application(s) Topical two times a day  valproic  acid Syrup 500 milliGRAM(s) Oral <User Schedule>    MEDICATIONS  (PRN):  acetaminophen     Tablet .. 650 milliGRAM(s) Oral every 6 hours PRN Temp greater or equal to 38C (100.4F), Mild Pain (1 - 3)  albuterol    90 MICROgram(s) HFA Inhaler 2 Puff(s) Inhalation every 6 hours PRN Shortness of Breath and/or Wheezing  aluminum hydroxide/magnesium hydroxide/simethicone Suspension 30 milliLiter(s) Oral every 4 hours PRN Dyspepsia  oxycodone    5 mG/acetaminophen 325 mG 1 Tablet(s) Oral every 4 hours PRN Moderate Pain (4 - 6)    Allergies    No Known Allergies    Intolerances      REVIEW OF SYSTEMS:  All other systems reviewed and are negative    Vital Signs Last 24 Hrs  T(C): 36.7 (2023 16:23), Max: 38 (2023 11:46)  T(F): 98.1 (2023 16:23), Max: 100.4 (2023 11:46)  HR: 73 (2023 16:23) (68 - 88)  BP: 108/58 (2023 16:23) (103/64 - 122/67)  BP(mean): --  RR: 18 (2023 16:23) (18 - 18)  SpO2: 99% (2023 16:23) (96% - 100%)    Parameters below as of 2023 11:38  Patient On (Oxygen Delivery Method): tracheostomy collar      Daily     Daily Weight in k.5 (2023 04:36)  I&O's Summary    2023 07:01  -  2023 07:00  --------------------------------------------------------  IN: 1080 mL / OUT: 0 mL / NET: 1080 mL      CAPILLARY BLOOD GLUCOSE      POCT Blood Glucose.: 107 mg/dL (2023 16:45)  POCT Blood Glucose.: 168 mg/dL (2023 11:27)  POCT Blood Glucose.: 131 mg/dL (2023 08:07)  POCT Blood Glucose.: 147 mg/dL (2023 06:28)  POCT Blood Glucose.: 111 mg/dL (2023 00:04)    PHYSICAL EXAM:  GENERAL: NAD, well-groomed, well-developed  HEAD:  Atraumatic, Normocephalic  EYES: EOMI, PERRLA, conjunctiva and sclera clear  ENMT: No tonsillar erythema, exudates, or enlargement; Moist mucous membranes, Good dentition, No lesions  NECK: Supple, No JVD, Normal thyroid  NERVOUS SYSTEM:  Alert & Oriented X3, Good concentration; able to follow direction   CHEST/LUNG: Clear to percussion bilaterally; No rales, rhonchi, wheezing, or rubs  HEART: Regular rate and rhythm; No murmurs, rubs, or gallops  ABDOMEN: Soft, Nontender, Nondistended; Bowel sounds present  EXTREMITIES:  2+ Peripheral Pulses, No clubbing, cyanosis, or edema  LYMPH: No lymphadenopathy noted  SKIN: No rashes or lesions  Lines: Peg, trach    Labs                          9.1    7.99  )-----------( 260      ( 2023 07:42 )             29.7         145  |  109<H>  |  32<H>  ----------------------------<  132<H>  3.9   |  29  |  0.99    Ca    8.9      2023 07:42    TPro  6.4  /  Alb  2.3<L>  /  TBili  0.3  /  DBili  x   /  AST  9<L>  /  ALT  18  /  AlkPhos  80

## 2023-01-19 NOTE — PROGRESS NOTE ADULT - ASSESSMENT
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 ACLS for PEA arrest and ROSC returned after 5 minutes. Cardiac arrest possibly secondary to severe hypoglycemia from eating less and not tracking blood sugar carefully. Hospital course complicated by 1) prolonged hypoxemic respiratory failure. sp trach and peg 2) left upper extremity DVT and placed on eliquis 3) multiple infections (enteroccoal facelis cellutis, infected left hand hematoma, tracheobronchitis secondary to citrobacr 4) tonic clonic seizure - on keppra/depakoate- currently undergoing reevaluation by NEUROLOGY 5) fever of 102 on 1/19. INFECTIOUS DISEASE reconsulted and pan culture ordered     CARDIOLOGY  # Cad sp PCI w/ stent  # CABG  # HF w/ PeF  # 2nd degree heart block  # sp PPM placement   # cardiac arrest- resolved  - cardiac arrest possibly secondary to hypoglycemia???? reports low blood sugar- on insulin/ po meds- and not eating enough recently  - had ROSC after 5 minute down time  - ECHO Takotsubo cardiomyopathy. EF 50-55%   - lopressor 25 mg po bid, aspirin 81 mg po daily, lipitor 40 mg po qhs    PULMNOLOGY  # COPD  # prolonged hypoxemic respiratory failure  - sp intubation for cardiac arrest  - failed extubation now on trach to vent  - hospital course complicated by blood secretions from trach. bleeding has self resolved     INFECTIOUS DISEASE  # trachobronchitis secondary to citrobacter?? - resolved  # enterococcal faecalis cellulitis - resolved   # infected left hand hematoma - s/p bedside debridement 12/13   # fever 102 on 1/19  -12/13 Hand surgery consult appreciated, s/p bedside debridement.     HEME/ONC  # LUE VTE wi  -LUE venous duplex : occlusive thrombus in the left mid subclavian vein with partial slow flow detected in the lateral subclavian vein.  -LUE arterial Duplex neg   --change lovenox to eliquis 5 mg bid.     NEUROLOGY  # anoxic brain injury  # tonic clonic seizure  - had new flailing of the legs today which are concerning fro myoclonus vs seizure   - on keppmichael, depakaote   Dr. Loo. aware and ordered VEEG which now shows myoclonic seizure. will reach out to Dr. Loo as hes still sx     GASTROENTEROLOGY  # Shock liver.- resolved  du- e to PEA arrest    ENDOCRINOLOGY  - DM2   - A1c 8.8%  - on lantus/RISS    DERMATOLOGY  - Ustageable sacral pressure wound   - stage 3 sacral decub ulcer   - s/p debridement 12/19  - wound care    PLAN  - c/w current level of care  - panculture  - follow up with INFECTIOUS DISEASE for new fever of 102  - follow up with NEUROLOGY over EEG and readjustment of seizure medication    Preventative measures   dvt ppx>>>on DOAC  fall, aspiration  precautions    Dipso: Patient has no insurance and Family in process of getting guardianship. Patient will then need placement.        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 ACLS for PEA arrest and ROSC returned after 5 minutes. Cardiac arrest possibly secondary to severe hypoglycemia from eating less and not tracking blood sugar carefully. Hospital course complicated by 1) prolonged hypoxemic respiratory failure. sp trach and peg 2) left upper extremity DVT and placed on eliquis 3) multiple infections (enteroccoal facelis cellutis, infected left hand hematoma, tracheobronchitis secondary to citrobacr 4) tonic clonic seizure - on keppra/depakoate- currently undergoing reevaluation by NEUROLOGY 5) fever of 102 on 1/19. INFECTIOUS DISEASE reconsulted and pan culture ordered     CARDIOLOGY  # Cad sp PCI w/ stent  # CABG  # HF w/ PeF  # 2nd degree heart block  # sp PPM placement   # cardiac arrest- resolved  - cardiac arrest possibly secondary to hypoglycemia???? reports low blood sugar- on insulin/ po meds- and not eating enough recently  - had ROSC after 5 minute down time  - ECHO Takotsubo cardiomyopathy. EF 50-55%   - lopressor 25 mg po bid, aspirin 81 mg po daily, lipitor 40 mg po qhs    PULMNOLOGY  # COPD  # prolonged hypoxemic respiratory failure  - sp intubation for cardiac arrest  - failed extubation now on trach to vent  - hospital course complicated by blood secretions from trach. bleeding has self resolved     INFECTIOUS DISEASE  # trachobronchitis secondary to citrobacter?? - resolved  # enterococcal faecalis cellulitis - resolved   # infected left hand hematoma - s/p bedside debridement 12/13   # fever 102 on 1/19  -12/13 Hand surgery consult appreciated, s/p bedside debridement.   - rocephin day # 3. still had fever of 102 on 1/19. INFECTIOUS DISEASE reconsulted  - blood culture 1/19- x 2 pending  - urine culture 1/19- pending    HEME/ONC  # LUE VTE wi  -LUE venous duplex : occlusive thrombus in the left mid subclavian vein with partial slow flow detected in the lateral subclavian vein.  -LUE arterial Duplex neg   --change lovenox to eliquis 5 mg bid.     NEUROLOGY  # anoxic brain injury  # tonic clonic seizure  - had new flailing of the legs today which are concerning fro myoclonus vs seizure   - on mario salazar Dr.. aware and ordered VEEG which now shows myoclonic seizure. will reach out to Dr. Loo as hes still sx     GASTROENTEROLOGY  # Shock liver.- resolved  du- e to PEA arrest    ENDOCRINOLOGY  - DM2   - A1c 8.8%  - on lantus/RISS    DERMATOLOGY  - Ustageable sacral pressure wound   - stage 3 sacral decub ulcer   - s/p debridement 12/19  - wound care    PLAN  - c/w current level of care  - panculture  - follow up with INFECTIOUS DISEASE for new fever of 102  - follow up with NEUROLOGY over EEG and readjustment of seizure medication    Preventative measures   dvt ppx>>>on DOAC  fall, aspiration  precautions    Dipso: Patient has no insurance and Family in process of getting guardianship. Patient will then need placement.

## 2023-01-20 DIAGNOSIS — R50.9 FEVER, UNSPECIFIED: ICD-10-CM

## 2023-01-20 LAB
ALBUMIN SERPL ELPH-MCNC: 2.2 G/DL — LOW (ref 3.3–5)
ALP SERPL-CCNC: 82 U/L — SIGNIFICANT CHANGE UP (ref 40–120)
ALT FLD-CCNC: 17 U/L — SIGNIFICANT CHANGE UP (ref 12–78)
ANION GAP SERPL CALC-SCNC: 7 MMOL/L — SIGNIFICANT CHANGE UP (ref 5–17)
APPEARANCE UR: CLEAR — SIGNIFICANT CHANGE UP
AST SERPL-CCNC: 9 U/L — LOW (ref 15–37)
BACTERIA # UR AUTO: ABNORMAL
BILIRUB SERPL-MCNC: 0.2 MG/DL — SIGNIFICANT CHANGE UP (ref 0.2–1.2)
BILIRUB UR-MCNC: NEGATIVE — SIGNIFICANT CHANGE UP
BUN SERPL-MCNC: 31 MG/DL — HIGH (ref 7–23)
CALCIUM SERPL-MCNC: 8.8 MG/DL — SIGNIFICANT CHANGE UP (ref 8.5–10.1)
CHLORIDE SERPL-SCNC: 109 MMOL/L — HIGH (ref 96–108)
CO2 SERPL-SCNC: 28 MMOL/L — SIGNIFICANT CHANGE UP (ref 22–31)
COLOR SPEC: YELLOW — SIGNIFICANT CHANGE UP
CREAT SERPL-MCNC: 0.96 MG/DL — SIGNIFICANT CHANGE UP (ref 0.5–1.3)
DIFF PNL FLD: NEGATIVE — SIGNIFICANT CHANGE UP
EGFR: 83 ML/MIN/1.73M2 — SIGNIFICANT CHANGE UP
EPI CELLS # UR: SIGNIFICANT CHANGE UP
GLUCOSE BLDC GLUCOMTR-MCNC: 154 MG/DL — HIGH (ref 70–99)
GLUCOSE BLDC GLUCOMTR-MCNC: 162 MG/DL — HIGH (ref 70–99)
GLUCOSE BLDC GLUCOMTR-MCNC: 164 MG/DL — HIGH (ref 70–99)
GLUCOSE BLDC GLUCOMTR-MCNC: 182 MG/DL — HIGH (ref 70–99)
GLUCOSE BLDC GLUCOMTR-MCNC: 183 MG/DL — HIGH (ref 70–99)
GLUCOSE BLDC GLUCOMTR-MCNC: 84 MG/DL — SIGNIFICANT CHANGE UP (ref 70–99)
GLUCOSE SERPL-MCNC: 147 MG/DL — HIGH (ref 70–99)
GLUCOSE UR QL: NEGATIVE MG/DL — SIGNIFICANT CHANGE UP
GRAM STN FLD: SIGNIFICANT CHANGE UP
HCT VFR BLD CALC: 30.9 % — LOW (ref 39–50)
HGB BLD-MCNC: 9.8 G/DL — LOW (ref 13–17)
KETONES UR-MCNC: ABNORMAL
LEUKOCYTE ESTERASE UR-ACNC: ABNORMAL
MAGNESIUM SERPL-MCNC: 2.4 MG/DL — SIGNIFICANT CHANGE UP (ref 1.6–2.6)
MCHC RBC-ENTMCNC: 30.8 PG — SIGNIFICANT CHANGE UP (ref 27–34)
MCHC RBC-ENTMCNC: 31.7 G/DL — LOW (ref 32–36)
MCV RBC AUTO: 97.2 FL — SIGNIFICANT CHANGE UP (ref 80–100)
NITRITE UR-MCNC: NEGATIVE — SIGNIFICANT CHANGE UP
NRBC # BLD: 0 /100 WBCS — SIGNIFICANT CHANGE UP (ref 0–0)
PH UR: 7 — SIGNIFICANT CHANGE UP (ref 5–8)
PHOSPHATE SERPL-MCNC: 2.6 MG/DL — SIGNIFICANT CHANGE UP (ref 2.5–4.5)
PLATELET # BLD AUTO: 270 K/UL — SIGNIFICANT CHANGE UP (ref 150–400)
POTASSIUM SERPL-MCNC: 3.9 MMOL/L — SIGNIFICANT CHANGE UP (ref 3.5–5.3)
POTASSIUM SERPL-SCNC: 3.9 MMOL/L — SIGNIFICANT CHANGE UP (ref 3.5–5.3)
PROCALCITONIN SERPL-MCNC: 0.06 NG/ML — SIGNIFICANT CHANGE UP (ref 0.02–0.1)
PROT SERPL-MCNC: 6.3 GM/DL — SIGNIFICANT CHANGE UP (ref 6–8.3)
PROT UR-MCNC: 30 MG/DL
RBC # BLD: 3.18 M/UL — LOW (ref 4.2–5.8)
RBC # FLD: 14.6 % — HIGH (ref 10.3–14.5)
RBC CASTS # UR COMP ASSIST: SIGNIFICANT CHANGE UP /HPF (ref 0–4)
SODIUM SERPL-SCNC: 144 MMOL/L — SIGNIFICANT CHANGE UP (ref 135–145)
SP GR SPEC: 1.01 — SIGNIFICANT CHANGE UP (ref 1.01–1.02)
SPECIMEN SOURCE: SIGNIFICANT CHANGE UP
UROBILINOGEN FLD QL: NEGATIVE MG/DL — SIGNIFICANT CHANGE UP
VALPROATE SERPL-MCNC: 49 UG/ML — LOW (ref 50–100)
WBC # BLD: 6.86 K/UL — SIGNIFICANT CHANGE UP (ref 3.8–10.5)
WBC # FLD AUTO: 6.86 K/UL — SIGNIFICANT CHANGE UP (ref 3.8–10.5)
WBC UR QL: SIGNIFICANT CHANGE UP

## 2023-01-20 PROCEDURE — 99232 SBSQ HOSP IP/OBS MODERATE 35: CPT

## 2023-01-20 PROCEDURE — 99233 SBSQ HOSP IP/OBS HIGH 50: CPT

## 2023-01-20 PROCEDURE — 95720 EEG PHY/QHP EA INCR W/VEEG: CPT

## 2023-01-20 PROCEDURE — 99358 PROLONG SERVICE W/O CONTACT: CPT

## 2023-01-20 RX ORDER — CALCIUM CARBONATE 500(1250)
1 TABLET ORAL THREE TIMES A DAY
Refills: 0 | Status: COMPLETED | OUTPATIENT
Start: 2023-01-20 | End: 2023-01-21

## 2023-01-20 RX ORDER — VALPROIC ACID (AS SODIUM SALT) 250 MG/5ML
750 SOLUTION, ORAL ORAL
Refills: 0 | Status: DISCONTINUED | OUTPATIENT
Start: 2023-01-20 | End: 2023-02-17

## 2023-01-20 RX ORDER — BACITRACIN ZINC 500 UNIT/G
1 OINTMENT IN PACKET (EA) TOPICAL
Refills: 0 | Status: DISCONTINUED | OUTPATIENT
Start: 2023-01-20 | End: 2023-02-28

## 2023-01-20 RX ADMIN — Medication 3 MILLILITER(S): at 17:07

## 2023-01-20 RX ADMIN — CHLORHEXIDINE GLUCONATE 15 MILLILITER(S): 213 SOLUTION TOPICAL at 17:30

## 2023-01-20 RX ADMIN — Medication 2: at 23:51

## 2023-01-20 RX ADMIN — Medication 1 APPLICATION(S): at 17:50

## 2023-01-20 RX ADMIN — Medication 1 TABLET(S): at 21:36

## 2023-01-20 RX ADMIN — Medication 2: at 11:37

## 2023-01-20 RX ADMIN — Medication 750 MILLIGRAM(S): at 21:36

## 2023-01-20 RX ADMIN — Medication 1 APPLICATION(S): at 17:34

## 2023-01-20 RX ADMIN — SCOPALAMINE 1 PATCH: 1 PATCH, EXTENDED RELEASE TRANSDERMAL at 07:23

## 2023-01-20 RX ADMIN — ATORVASTATIN CALCIUM 20 MILLIGRAM(S): 80 TABLET, FILM COATED ORAL at 21:35

## 2023-01-20 RX ADMIN — Medication 81 MILLIGRAM(S): at 11:36

## 2023-01-20 RX ADMIN — CEFTRIAXONE 100 MILLIGRAM(S): 500 INJECTION, POWDER, FOR SOLUTION INTRAMUSCULAR; INTRAVENOUS at 10:42

## 2023-01-20 RX ADMIN — INSULIN GLARGINE 20 UNIT(S): 100 INJECTION, SOLUTION SUBCUTANEOUS at 08:07

## 2023-01-20 RX ADMIN — APIXABAN 5 MILLIGRAM(S): 2.5 TABLET, FILM COATED ORAL at 05:10

## 2023-01-20 RX ADMIN — Medication 650 MILLIGRAM(S): at 09:01

## 2023-01-20 RX ADMIN — Medication 3 MILLILITER(S): at 12:09

## 2023-01-20 RX ADMIN — Medication 2: at 00:32

## 2023-01-20 RX ADMIN — Medication 3 MILLILITER(S): at 00:28

## 2023-01-20 RX ADMIN — BUDESONIDE AND FORMOTEROL FUMARATE DIHYDRATE 2 PUFF(S): 160; 4.5 AEROSOL RESPIRATORY (INHALATION) at 17:07

## 2023-01-20 RX ADMIN — BUDESONIDE AND FORMOTEROL FUMARATE DIHYDRATE 2 PUFF(S): 160; 4.5 AEROSOL RESPIRATORY (INHALATION) at 05:03

## 2023-01-20 RX ADMIN — Medication 1 APPLICATION(S): at 05:11

## 2023-01-20 RX ADMIN — SCOPALAMINE 1 PATCH: 1 PATCH, EXTENDED RELEASE TRANSDERMAL at 21:33

## 2023-01-20 RX ADMIN — APIXABAN 5 MILLIGRAM(S): 2.5 TABLET, FILM COATED ORAL at 17:30

## 2023-01-20 RX ADMIN — POLYETHYLENE GLYCOL 3350 17 GRAM(S): 17 POWDER, FOR SOLUTION ORAL at 11:37

## 2023-01-20 RX ADMIN — CHLORHEXIDINE GLUCONATE 15 MILLILITER(S): 213 SOLUTION TOPICAL at 05:21

## 2023-01-20 RX ADMIN — Medication 1 APPLICATION(S): at 17:35

## 2023-01-20 RX ADMIN — ROBINUL 1 MILLIGRAM(S): 0.2 INJECTION INTRAMUSCULAR; INTRAVENOUS at 17:31

## 2023-01-20 RX ADMIN — Medication 1 TABLET(S): at 18:50

## 2023-01-20 RX ADMIN — LEVETIRACETAM 1500 MILLIGRAM(S): 250 TABLET, FILM COATED ORAL at 05:10

## 2023-01-20 RX ADMIN — Medication 25 MILLIGRAM(S): at 05:10

## 2023-01-20 RX ADMIN — Medication 2: at 05:20

## 2023-01-20 RX ADMIN — ROBINUL 1 MILLIGRAM(S): 0.2 INJECTION INTRAMUSCULAR; INTRAVENOUS at 05:10

## 2023-01-20 RX ADMIN — Medication 1 MILLIGRAM(S): at 11:36

## 2023-01-20 RX ADMIN — PREGABALIN 1000 MICROGRAM(S): 225 CAPSULE ORAL at 11:36

## 2023-01-20 RX ADMIN — Medication 650 MILLIGRAM(S): at 09:44

## 2023-01-20 RX ADMIN — Medication 3 MILLILITER(S): at 05:02

## 2023-01-20 RX ADMIN — Medication 500 MILLIGRAM(S): at 08:07

## 2023-01-20 RX ADMIN — CHLORHEXIDINE GLUCONATE 1 APPLICATION(S): 213 SOLUTION TOPICAL at 05:22

## 2023-01-20 RX ADMIN — LEVETIRACETAM 1500 MILLIGRAM(S): 250 TABLET, FILM COATED ORAL at 17:31

## 2023-01-20 RX ADMIN — Medication 3 MILLILITER(S): at 23:28

## 2023-01-20 NOTE — PROGRESS NOTE ADULT - SUBJECTIVE AND OBJECTIVE BOX
PULMONARY CONSULT/FOLLOWUP  NOTE      BRANDON CAMARILLO  MRN-[id number]    Patient is a 72y old  Male who presents with a chief complaint of s/p cardiac arrest, hypoglycemia (2023 20:25)      HISTORY OF PRESENT ILLNESS:    MEDICATIONS  (STANDING):  albuterol/ipratropium for Nebulization 3 milliLiter(s) Nebulizer every 6 hours  apixaban 5 milliGRAM(s) Oral every 12 hours  aspirin  chewable 81 milliGRAM(s) Oral daily  atorvastatin 20 milliGRAM(s) Oral at bedtime  bacitracin   Ointment 1 Application(s) Topical two times a day  budesonide 160 MICROgram(s)/formoterol 4.5 MICROgram(s) Inhaler 2 Puff(s) Inhalation two times a day  cefTRIAXone   IVPB 1000 milliGRAM(s) IV Intermittent every 24 hours  chlorhexidine 0.12% Liquid 15 milliLiter(s) Oral Mucosa every 12 hours  chlorhexidine 2% Cloths 1 Application(s) Topical <User Schedule>  collagenase Ointment 1 Application(s) Topical two times a day  cyanocobalamin 1000 MICROGram(s) Oral daily  diphenhydrAMINE Injectable 50 milliGRAM(s) IV Push once  folic acid 1 milliGRAM(s) Oral daily  glycopyrrolate 1 milliGRAM(s) Oral two times a day  insulin glargine Injectable (LANTUS) 20 Unit(s) SubCutaneous every morning  insulin lispro (ADMELOG) corrective regimen sliding scale   SubCutaneous every 6 hours  levETIRAcetam 1500 milliGRAM(s) Oral two times a day  metoprolol tartrate 25 milliGRAM(s) Oral two times a day  polyethylene glycol 3350 17 Gram(s) Oral daily  saline laxative (FLEET) Rectal Enema 1 Enema Rectal once  scopolamine 1 mG/72 Hr(s) Patch 1 Patch Transdermal every 72 hours  senna 2 Tablet(s) Oral at bedtime  silver sulfADIAZINE 1% Cream 1 Application(s) Topical two times a day  valproic  acid Syrup 500 milliGRAM(s) Oral <User Schedule>      MEDICATIONS  (PRN):  acetaminophen     Tablet .. 650 milliGRAM(s) Oral every 6 hours PRN Temp greater or equal to 38C (100.4F), Mild Pain (1 - 3)  albuterol    90 MICROgram(s) HFA Inhaler 2 Puff(s) Inhalation every 6 hours PRN Shortness of Breath and/or Wheezing  aluminum hydroxide/magnesium hydroxide/simethicone Suspension 30 milliLiter(s) Oral every 4 hours PRN Dyspepsia  oxycodone    5 mG/acetaminophen 325 mG 1 Tablet(s) Oral every 4 hours PRN Moderate Pain (4 - 6)      Allergies    No Known Allergies    Intolerances        PAST MEDICAL & SURGICAL HISTORY:  HTN (hypertension)      HLD (hyperlipidemia)      Diabetes mellitus      Lacunar infarction      BPH (benign prostatic hyperplasia)      CHF (congestive heart failure)      Chronic obstructive pulmonary disease, unspecified COPD type      S/P CABG (coronary artery bypass graft)            FAMILY HISTORY:      SOCIAL HISTORY  Smoking History:     REVIEW OF SYSTEMS:    REVIEW OF SYSTEMS      General:	    Skin/Breast:  	  Ophthalmologic:  	  ENMT:	    Respiratory and Thorax:  	  Cardiovascular:	    Gastrointestinal:	    Genitourinary:	    Musculoskeletal:	    Neurological:	    Psychiatric:	    Hematology/Lymphatics:	    Endocrine:	    Allergic/Immunologic:	    Vital Signs Last 24 Hrs  T(C): 36.7 (2023 04:41), Max: 38 (2023 11:46)  T(F): 98 (2023 04:41), Max: 100.4 (2023 11:46)  HR: 68 (2023 05:00) (59 - 74)  BP: 134/76 (2023 04:41) (108/58 - 134/76)  BP(mean): --  RR: 18 (2023 04:41) (18 - 18)  SpO2: 100% (2023 05:00) (96% - 100%)    Parameters below as of 2023 11:38  Patient On (Oxygen Delivery Method): tracheostomy collar          23 @ 07:01  -  23 @ 07:00  --------------------------------------------------------  IN: 0 mL / OUT: 200 mL / NET: -200 mL        PHYSICAL EXAMINATION:  PHYSICAL EXAM:      Constitutional:    Eyes:    ENMT:    Neck:    Breasts:    Back:    Respiratory:    Cardiovascular:    Gastrointestinal:    Genitourinary:    Rectal:    Extremities:    Vascular:    Neurological:    Skin:    Lymph Nodes:    Musculoskeletal:    Psychiatric:          LABS:                        9.8    6.86  )-----------( 270      ( 2023 07:50 )             30.9         145  |  109<H>  |  32<H>  ----------------------------<  132<H>  3.9   |  29  |  0.99    Ca    8.9      2023 07:42    TPro  6.4  /  Alb  2.3<L>  /  TBili  0.3  /  DBili  x   /  AST  9<L>  /  ALT  18  /  AlkPhos  80        Urinalysis Basic - ( 2023 08:10 )    Color: Yellow / Appearance: Clear / S.010 / pH: x  Gluc: x / Ketone: Trace  / Bili: Negative / Urobili: Negative mg/dL   Blood: x / Protein: 30 mg/dL / Nitrite: Negative   Leuk Esterase: Trace / RBC: x / WBC x   Sq Epi: x / Non Sq Epi: x / Bacteria: x                  Procalcitonin, Serum: 0.07 ng/mL (23 @ 15:05)    Mode: AC/ CMV (Assist Control/ Continuous Mandatory Ventilation), RR (machine): 14, TV (machine): 450, FiO2: 35, PEEP: 5, ITime: 0.8, MAP: 10, PIP: 23  MICROBIOLOGY:    RADIOLOGY & ADDITIONAL STUDIES:

## 2023-01-20 NOTE — PROGRESS NOTE ADULT - ASSESSMENT
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 ACLS for PEA arrest and ROSC returned after 5 minutes. Cardiac arrest possibly secondary to severe hypoglycemia from eating less and not tracking blood sugar carefully. Hospital course complicated by 1) prolonged hypoxemic respiratory failure. sp trach and peg 2) left upper extremity DVT and placed on eliquis 3) multiple infections (enteroccoal facelis cellutis, infected left hand hematoma, tracheobronchitis secondary to citrobacr 4) tonic clonic seizure - on keppra/depakoate- currently undergoing reevaluation by NEUROLOGY 5) fever of 102 on 1/19. INFECTIOUS DISEASE reconsulted and pan culture ordered     CARDIOLOGY  # Cad s/p PCI w/ stent  # CABG  # HF w/ PeF  # 2nd degree heart block  # sp PPM placement   # cardiac arrest- resolved  - ECHO Takotsubo cardiomyopathy. EF 50-55%   - lopressor 25 mg po bid, aspirin 81 mg po daily, lipitor 40 mg po qhs    PULMNOLOGY  # COPD  # prolonged hypoxemic respiratory failure  - s/p intubation for cardiac arrest  - failed extubation now on trach to vent  - hospital course complicated by blood secretions from trach. bleeding has self resolved     INFECTIOUS DISEASE  # trachobronchitis secondary to citrobacter?? - resolved  # enterococcal faecalis cellulitis - resolved   # infected left hand hematoma - s/p bedside debridement 12/13   # fever 102 on 1/19  -12/13 Hand surgery consult appreciated, s/p bedside debridement.   -   1/20/2023  rocephin day # 4. still had fever. INFECTIOUS DISEASE reconsulted by elaine  - blood culture 1/19- x 2 pending  - urine culture 1/19- pending  stage 3 decubuti--will get wound care re consult  will get specialty bed    HEME/ONC  # LUE VTE wi  -LUE venous duplex : occlusive thrombus in the left mid subclavian vein with partial slow flow detected in the lateral subclavian vein.  -LUE arterial Duplex neg   --change lovenox to eliquis 5 mg bid.     NEUROLOGY  # anoxic brain injury  # tonic clonic seizure  - had new flailing of the legs today which are concerning fro myoclonus vs seizure   - on keppra, depakaote   " Dr. Loo. aware and ordered VEEG which now shows myoclonic seizure. will reach out to Dr. Loo as he is still seizure "    GASTROENTEROLOGY  # Shock liver.- resolved  due to PEA arrest    ENDOCRINOLOGY  - DM2   - A1c 8.8%  - on lantus/RISS    DERMATOLOGY  - Ustageable sacral pressure wound   - stage 3 sacral decub ulcer   - s/p debridement 12/19  - wound care    PLAN  - c/w current level of care  - panculture  - follow up with INFECTIOUS DISEASE   - follow up with NEUROLOGY over EEG and readjustment of seizure medication    Preventative measures   dvt ppx>>>on DOAC  fall, aspiration  precautions    Dipso: Patient has no insurance and Family in process of getting guardianship. Patient will then need placement.        71 yo male w/ pmhx of COPD, CAD sp PCI w/ stent, CABG 2014, HF w/ PeF, 2nd degree heart block, sp PPM, HTN, DM, CKD Stage 3, CVA- lacunar infarcts admitted to ICU originally for cardiac arrest. ACLS for PEA arrest and ROSC returned after 5 minutes. Cardiac arrest possibly secondary to severe hypoglycemia from eating less and not tracking blood sugar carefully. Hospital course complicated by 1) prolonged hypoxemic respiratory failure. sp trach and peg 2) left upper extremity DVT and placed on eliquis 3) multiple infections (enteroccoal facelis cellutis, infected left hand hematoma, tracheobronchitis secondary to citrobacr 4) tonic clonic seizure - on keppra/depakoate- currently undergoing reevaluation by NEUROLOGY 5) fever of 102 on 1/19. INFECTIOUS DISEASE reconsulted and pan culture ordered     CARDIOLOGY  # Cad s/p PCI w/ stent  # CABG  # HF w/ PeF  # 2nd degree heart block  # sp PPM placement   # cardiac arrest- resolved  - ECHO Takotsubo cardiomyopathy. EF 50-55%   - lopressor 25 mg po bid, aspirin 81 mg po daily, lipitor 40 mg po qhs    PULMNOLOGY  # COPD  # prolonged hypoxemic respiratory failure  - s/p intubation for cardiac arrest  - failed extubation now on trach to vent  - hospital course complicated by blood secretions from trach. bleeding has self resolved     INFECTIOUS DISEASE  # trachobronchitis secondary to citrobacter?? - resolved  # enterococcal faecalis cellulitis - resolved   # infected left hand hematoma - s/p bedside debridement 12/13   # fever 102 on 1/19  -12/13 Hand surgery consult appreciated, s/p bedside debridement.   -   1/20/2023  rocephin day # 4. still had fever. INFECTIOUS DISEASE reconsulted by elaine  - blood culture 1/19- x 2 pending  - urine culture 1/19- pending  stage 3 decubuti--will get wound care re consult  will get specialty bed    HEME/ONC  # LUE VTE wi  -LUE venous duplex : occlusive thrombus in the left mid subclavian vein with partial slow flow detected in the lateral subclavian vein.  -LUE arterial Duplex neg   --change lovenox to eliquis 5 mg bid.     NEUROLOGY  # anoxic brain injury  # tonic clonic seizure  - had new flailing of the legs today which are concerning fro myoclonus vs seizure   - on keppra, depakaote   " Dr. Loo. aware and ordered VEEG which now shows myoclonic seizure. will reach out to Dr. Loo as he is still seizure "    GASTROENTEROLOGY  # Shock liver.- resolved  due to PEA arrest    ENDOCRINOLOGY  - DM2   - A1c 8.8%  - on lantus/RISS    DERMATOLOGY  - Ustageable sacral pressure wound   - stage 3 sacral decub ulcer   - s/p debridement 12/19  - wound care    PLAN  - c/w current level of care  - panculture  - follow up with INFECTIOUS DISEASE   - follow up with NEUROLOGY over EEG and readjustment of seizure medication--> reccs are to increase to 750 bid level at 49    Preventative measures   dvt ppx>>>on DOAC  fall, aspiration  precautions    Dipso: Patient has no insurance and Family in process of getting guardianship. Patient will then need placement.

## 2023-01-20 NOTE — PROGRESS NOTE ADULT - SUBJECTIVE AND OBJECTIVE BOX
[facility]  Division of Infectious Diseases  [phone]    Name: BRANDON CAMARILLO  Age: 72y  Gender: Male  MRN: [id number]    Interval History--  Notes reviewed.     Past Medical History--  Type 2 diabetes mellitus    Essential hypertension    HTN (hypertension)    HLD (hyperlipidemia)    Diabetes mellitus    Lacunar infarction    BPH (benign prostatic hyperplasia)    CHF (congestive heart failure)    Chronic obstructive pulmonary disease, unspecified COPD type    S/P CABG (coronary artery bypass graft)        For details regarding the patient's social history, family history, and other miscellaneous elements, please refer the initial infectious diseases consultation and/or the admitting history and physical examination for this admission.    Allergies    No Known Allergies    Intolerances        Medications--  Antibiotics:  cefTRIAXone   IVPB 1000 milliGRAM(s) IV Intermittent every 24 hours    Immunologic:    Other:  acetaminophen     Tablet .. PRN  albuterol    90 MICROgram(s) HFA Inhaler PRN  albuterol/ipratropium for Nebulization  aluminum hydroxide/magnesium hydroxide/simethicone Suspension PRN  apixaban  aspirin  chewable  atorvastatin  bacitracin   Ointment  budesonide 160 MICROgram(s)/formoterol 4.5 MICROgram(s) Inhaler  chlorhexidine 0.12% Liquid  chlorhexidine 2% Cloths  collagenase Ointment  cyanocobalamin  diphenhydrAMINE Injectable  folic acid  glycopyrrolate  insulin glargine Injectable (LANTUS)  insulin lispro (ADMELOG) corrective regimen sliding scale  levETIRAcetam  metoprolol tartrate  oxycodone    5 mG/acetaminophen 325 mG PRN  polyethylene glycol 3350  saline laxative (FLEET) Rectal Enema  scopolamine 1 mG/72 Hr(s) Patch  senna  silver sulfADIAZINE 1% Cream  valproic  acid Syrup      Review of Systems--  A 10-point review of systems was obtained.     Pertinent positives and negatives--  Constitutional: No fevers. No Chills. No Rigors.   Cardiovascular: No chest pain. No palpitations.  Respiratory: No shortness of breath. No cough.  Gastrointestinal: No nausea or vomiting. No diarrhea or constipation.   Psychiatric: Pleasant. Appropriate affect.    Review of systems otherwise negative except as previously noted.    Physical Examination--  Vital Signs: T(F): 98.5 (01-20-23 @ 10:43), Max: 100.4 (01-19-23 @ 11:46)  HR: 65 (01-20-23 @ 10:43)  BP: 123/77 (01-20-23 @ 10:43)  RR: 18 (01-20-23 @ 04:41)  SpO2: 100% (01-20-23 @ 10:43)  Wt(kg): --  General: Nontoxic-appearing Male in no acute distress.  HEENT: AT/NC. PERRL. EOMI. Anicteric. Conjunctiva pink and moist. Oropharynx clear. Dentition fair.  Neck: Not rigid. No sense of mass.  Nodes: None palpable.  Lungs: Clear bilaterally without rales, wheezing or rhonchi  Heart: Regular rate and rhythm. No Murmur. No rub. No gallop. No palpable thrill.  Abdomen: Bowel sounds present and normoactive. Soft. Nondistended. Nontender. No sense of mass. No organomegaly.  Back: No spinal tenderness. No costovertebral angle tenderness.   Extremities: No cyanosis or clubbing. No edema.   Skin: Warm. Dry. Good turgor. No rash. No vasculitic stigmata.  Psychiatric: Appropriate affect and mood for situation.         Laboratory Studies--  CBC                        9.8    6.86  )-----------( 270      ( 20 Jan 2023 07:50 )             30.9       Chemistries  01-20    144  |  109<H>  |  31<H>  ----------------------------<  147<H>  3.9   |  28  |  0.96    Ca    8.8      20 Jan 2023 07:50  Phos  2.6     01-20  Mg     2.4     01-20    TPro  6.3  /  Alb  2.2<L>  /  TBili  0.2  /  DBili  x   /  AST  9<L>  /  ALT  17  /  AlkPhos  82  01-20      Culture Data    Culture - Sputum (collected 13 Jan 2023 17:10)  Source: Trach Asp Tracheal Aspirate  Gram Stain (15 Brando 2023 17:25):    Moderate polymorphonuclear leukocytes per low power field    Rare Squamous epithelial cells per low power field    Moderate Gram Negative Rods seen per oil power field    Moderate Gram positive cocci in pairs seen per oil power field  Final Report (15 Brando 2023 17:25):    Moderate Citrobacter koseri    Normal Respiratory Larissa present  Organism: Citrobacter koseri (15 Brando 2023 17:25)  Organism: Citrobacter koseri (15 Brando 2023 17:25)    Culture - Blood (collected 13 Jan 2023 11:45)  Source: .Blood Blood  Final Report (18 Jan 2023 15:08):    No Growth Final             [facility]  Division of Infectious Diseases  [phone]    Name: BRANDON CAMARILLO  Age: 72y  Gender: Male  MRN: [id number]    Interval History--  Notes reviewed. Asked to reassess patient for fever.  He is previously known to our group.  He has had a protracted hospital stay, last seen by our group on December 20.  At that point in time he was having low-grade temperatures intermittently, and remained vent dependent.  Since last seen, he has been maintained off antibiotics until January 16 for concerns of pneumonia, though there was no repeat chest imaging at that point in time, and CT scan of the chest revealed only bronchiectasis, atelectasis, and scarring.  Antibiotics appear to been started for bronchorrhea.  He was noted to have low-grade fever on January 17, and 19th.  He has not had fever today.  Is not clear what the patient's cognitive capacity is.  He does appear to follow simple commands at times.  Looks comfortable, though tremulous at times, tolerating trach collar without incident.    Past Medical History--  Type 2 diabetes mellitus    Essential hypertension    HTN (hypertension)    HLD (hyperlipidemia)    Diabetes mellitus    Lacunar infarction    BPH (benign prostatic hyperplasia)    CHF (congestive heart failure)    Chronic obstructive pulmonary disease, unspecified COPD type    S/P CABG (coronary artery bypass graft)        For details regarding the patient's social history, family history, and other miscellaneous elements, please refer the initial infectious diseases consultation and/or the admitting history and physical examination for this admission.    Allergies    No Known Allergies    Intolerances        Medications--  Antibiotics:  cefTRIAXone   IVPB 1000 milliGRAM(s) IV Intermittent every 24 hours    Immunologic:    Other:  acetaminophen     Tablet .. PRN  albuterol    90 MICROgram(s) HFA Inhaler PRN  albuterol/ipratropium for Nebulization  aluminum hydroxide/magnesium hydroxide/simethicone Suspension PRN  apixaban  aspirin  chewable  atorvastatin  bacitracin   Ointment  budesonide 160 MICROgram(s)/formoterol 4.5 MICROgram(s) Inhaler  chlorhexidine 0.12% Liquid  chlorhexidine 2% Cloths  collagenase Ointment  cyanocobalamin  diphenhydrAMINE Injectable  folic acid  glycopyrrolate  insulin glargine Injectable (LANTUS)  insulin lispro (ADMELOG) corrective regimen sliding scale  levETIRAcetam  metoprolol tartrate  oxycodone    5 mG/acetaminophen 325 mG PRN  polyethylene glycol 3350  saline laxative (FLEET) Rectal Enema  scopolamine 1 mG/72 Hr(s) Patch  senna  silver sulfADIAZINE 1% Cream  valproic  acid Syrup      Review of Systems--  Review of systems unable secondary to clinical condition.       Physical Examination--  Vital Signs: T(F): 98.5 (01-20-23 @ 10:43), Max: 100.4 (01-19-23 @ 11:46)  HR: 65 (01-20-23 @ 10:43)  BP: 123/77 (01-20-23 @ 10:43)  RR: 18 (01-20-23 @ 04:41)  SpO2: 100% (01-20-23 @ 10:43)  Wt(kg): --  General: Nontoxic-appearing Male in no acute distress.  HEENT: AT/NC Anicteric. Conjunctiva pink and moist. Oropharynx/dentition unable secondary to patient compliance.   Neck: Not rigid. No sense of mass. TRach c/d/i. Clear to white secretions.   Nodes: None palpable.  Lungs: B scattered rhonchi  Heart: Regular rate and rhythm. No Murmur. No rub. No gallop. No palpable thrill.  Abdomen: Bowel sounds present and normoactive. Soft. Nondistended. Nontender. No sense of mass. No organomegaly. GT.  Back: No spinal tenderness. No costovertebral angle tenderness. Sacral sore 10x8cm, grade 3, not infected appearing.   Extremities: No cyanosis or clubbing. 1+ edema.  Line (midline?) c/d/1  Skin: Warm. Dry. Good turgor. No rash. No vasculitic stigmata.  Psychiatric: Unable        Laboratory Studies--  CBC                        9.8    6.86  )-----------( 270      ( 20 Jan 2023 07:50 )             30.9       Chemistries  01-20    144  |  109<H>  |  31<H>  ----------------------------<  147<H>  3.9   |  28  |  0.96    Ca    8.8      20 Jan 2023 07:50  Phos  2.6     01-20  Mg     2.4     01-20    TPro  6.3  /  Alb  2.2<L>  /  TBili  0.2  /  DBili  x   /  AST  9<L>  /  ALT  17  /  AlkPhos  82  01-20      Culture Data    Culture - Sputum (collected 13 Jan 2023 17:10)  Source: Trach Asp Tracheal Aspirate  Gram Stain (15 Brando 2023 17:25):    Moderate polymorphonuclear leukocytes per low power field    Rare Squamous epithelial cells per low power field    Moderate Gram Negative Rods seen per oil power field    Moderate Gram positive cocci in pairs seen per oil power field  Final Report (15 Brando 2023 17:25):    Moderate Citrobacter koseri    Normal Respiratory Larissa present  Organism: Citrobacter koseri (15 Brando 2023 17:25)  Organism: Citrobacter koseri (15 Brando 2023 17:25)    Culture - Blood (collected 13 Jan 2023 11:45)  Source: .Blood Blood  Final Report (18 Jan 2023 15:08):    No Growth Final

## 2023-01-20 NOTE — PROGRESS NOTE ADULT - ASSESSMENT
Status post incision and drainage of the left hand.  Change at this juncture appear to be mostly related to the DVT more than anything else I expect.  Unclear if the hematoma drained was secondarily infected, or not.  Culture is pending.  No leukocytosis.  He has had intermittent low-grade fevers through the beginning of this month, with higher grade fevers before that.    12/14: low grade temp last night and today around 16:00, no leukocytosis, left hand s/p I&D - culture is pending, cefazolin IV continued.   12/15: no fevers today thus far, no wbc, hand wound culture grew enterococcus, changing abx to IV vancomycin. Pt's wound with no pus, no malodor, seems not tender, no significant swelling or erythema of surrounding tissues, normal temperature. Surveillance BCs x 2 were collected.   12/16: low grade temp last night, no leukocytosis, cr ok, hand wound continues to improve. Pt's back was assessed with wound care / PT, possibly will need to be debrided, recommended vascular consult. Will add Zosyn to the regimen for now, Vancomycin IV continued, awaiting for BCs.   12/19: no longer spiking fevers, no WBC, BCs with no growth to date, now s/p sacral wound debridement, will stop abx.   12/20: no fevers, no leukocytosis, BCs with no growth to date, s/p debridement of sacral wound yesterday, off abx.     1/20/2023: Patient with fever intermittently for 2 days, low-grade, max 101 °F with IV catheter in place over 1 month.  This should be removed.  No blood culture data obtained.  No fever however today.  White blood cell count is normal, respiratory tract is stable.  The role of ceftriaxone here is not clear at all to me.  Clearly has not made an impact on the patient's bronchorrhea, and likely serves only to select resistant neal at this point in time.  No urine data obtained.  Note repeat chest imaging obtained.    Suggestions  If the patient has recurrent fever, blood cultures x2, urinalysis, urine culture, repeat chest x-ray  Stop ceftriaxone  Remove midline catheter  Left message for Dr. Moon    I will be available via Teams for any issues that may arise over the weekend.    Lester Chambers MD  Attending Physician  [facility]  Division of Infectious Diseases  [phone]    >1h total time

## 2023-01-20 NOTE — EEG REPORT - NS EEG TEXT BOX
BRANDON CAMARILLO [id number]     Study Date: 01/19/23 08:01 - 01/20/23 08:00  Duration: 23 hr 58 min  --------------------------------------------------------------------------------------------------  History:  CC/ HPI Patient is a 72y old  Male who presents with a chief complaint of s/p cardiac arrest, hypoglycemia (20 Jan 2023 11:34)    MEDICATIONS  (STANDING):  albuterol/ipratropium for Nebulization 3 milliLiter(s) Nebulizer every 6 hours  apixaban 5 milliGRAM(s) Oral every 12 hours  aspirin  chewable 81 milliGRAM(s) Oral daily  atorvastatin 20 milliGRAM(s) Oral at bedtime  bacitracin   Ointment 1 Application(s) Topical two times a day  budesonide 160 MICROgram(s)/formoterol 4.5 MICROgram(s) Inhaler 2 Puff(s) Inhalation two times a day  cefTRIAXone   IVPB 1000 milliGRAM(s) IV Intermittent every 24 hours  chlorhexidine 0.12% Liquid 15 milliLiter(s) Oral Mucosa every 12 hours  chlorhexidine 2% Cloths 1 Application(s) Topical <User Schedule>  collagenase Ointment 1 Application(s) Topical two times a day  cyanocobalamin 1000 MICROGram(s) Oral daily  diphenhydrAMINE Injectable 50 milliGRAM(s) IV Push once  folic acid 1 milliGRAM(s) Oral daily  glycopyrrolate 1 milliGRAM(s) Oral two times a day  insulin glargine Injectable (LANTUS) 20 Unit(s) SubCutaneous every morning  insulin lispro (ADMELOG) corrective regimen sliding scale   SubCutaneous every 6 hours  levETIRAcetam 1500 milliGRAM(s) Oral two times a day  metoprolol tartrate 25 milliGRAM(s) Oral two times a day  polyethylene glycol 3350 17 Gram(s) Oral daily  saline laxative (FLEET) Rectal Enema 1 Enema Rectal once  scopolamine 1 mG/72 Hr(s) Patch 1 Patch Transdermal every 72 hours  senna 2 Tablet(s) Oral at bedtime  silver sulfADIAZINE 1% Cream 1 Application(s) Topical two times a day  valproic  acid Syrup 500 milliGRAM(s) Oral <User Schedule>    --------------------------------------------------------------------------------------------------  Study Interpretation:    [[[Abbreviation Key:  PDR=alpha rhythm/posterior dominant rhythm. A-P=anterior posterior.  Amplitude: ‘very low’:<20; ‘low’:20-49; ‘medium’:; ‘high’:>150uV.  Persistence for periodic/rhythmic patterns (% of epoch) ‘rare’:<1%; ‘occasional’:1-10%; ‘frequent’:10-50%; ‘abundant’:50-90%; ‘continuous’:>90%.  Persistence for sporadic discharges: ‘rare’:<1/hr; ‘occasional’:1/min-1/hr; ‘frequent’:>1/min; ‘abundant’:>1/10 sec.  RPP=rhythmic and periodic patterns; GRDA=generalized rhythmic delta activity; FIRDA=frontal intermittent GRDA; LRDA=lateralized rhythmic delta activity; TIRDA=temporal intermittent rhythmic delta activity;  LPD=PLED=lateralized periodic discharges; GPD=generalized periodic discharges; BIPDs =bilateral independent periodic discharges; Mf=multifocal; SIRPDs=stimulus induced rhythmic, periodic, or ictal appearing discharges; BIRDs=brief potentially ictal rhythmic discharges >4 Hz, lasting .5-10s; PFA (paroxysmal bursts >13 Hz or =8 Hz <10s).  Modifiers: +F=with fast component; +S=with spike component; +R=with rhythmic component.  S-B=burst suppression pattern.  Max=maximal. N1-drowsy; N2-stage II sleep; N3-slow wave sleep. SSS/BETS=small sharp spikes/benign epileptiform transients of sleep. HV=hyperventilation; PS=photic stimulation]]]    Daily EEG Visual Analysis    FINDINGS:      Background:  Continuity: Continuous  Symmetry: Symmetric  Posterior dominant rhythm (PDR): 7.5-8 Hz, reactive to eye closure. Symmetric low-amplitude frontal beta in wakefulness.  State Change: Present  Voltage: Normal  Anterior Posterior Gradient: Present  Other background findings: None  Breach: Absent    Background Slowing:  Generalized slowing: Mild  Focal slowing: None    State Changes:   Drowsiness is characterized by fragmentation, attenuation, and slowing of the background frequencies.  Stage 2 sleep is characterized by symmetric vertex waves and occasional K complexes.      Sporadic Epileptiform Discharges and Rhythmic and Periodic Patterns (RPPs):  Frequent central (Cz, at times involving C3 and/or C4) polyspike-wave discharges occurring in bursts and runs at 1-3.5 Hz without clear evolution and without clinical correlate.    Electrographic and Electroclinical seizures:  Frequent myoclonic seizures: jerking of abdomen, BLE (such as b/l hip adduction jerks), and/or trunk correlating with central polyspike-wave discharges (Cz, at times involving C3 and/or C4). The patient is awake and making purposeful movements or interacting with staff during some jerking episodes. Occasionally, there is a brief abdominal jerk with a seizure during drowsiness or sleep. Occasionally, seizures are electrographic (no clinical correlate), such as during drowsiness or sleep.    Other Clinical Events:  None    Activation Procedures:   Hyperventilation was not performed.    Photic stimulation was not performed.    Artifacts:  Intermittent myogenic and movement artifacts are present.    EKG:  Single-lead EKG shows regular rhythm at 70-90 bpm.    EEG Classification / Summary:  Abnormal video-EEG in the awake, drowsy, and asleep states due to:  -Frequent myoclonic seizures. The patient is awake and making purposeful movements or interacting with staff during some of the seizures; other seizures occur during drowsiness or sleep; other seizures are electrographic (no clinical correlate), such as during drowsiness or sleep.  -Frequent bursts and runs of central polyspike-wave discharges without clear evolution and without clinical correlate  -Mild diffuse slowing    Clinical Impression:  -Frequent myoclonic seizures  -Frequent bursts and runs of central polyspike-wave discharges indicate risk of further myoclonic seizures from the central region  -Mild diffuse cerebral dysfunction of nonspecific etiology    Findings are overall similar to the prior day.  In the absence of further clinical concern, consider disconnecting study with reconnection in the future if indicated.        -------------------------------------------------------------------------------------------------------  [facility] EEG Reading Room Ph#: [phone]  Epilepsy Answering Service after 5PM and before 8:30AM: Ph#: [phone]    Viktoriya Nieves MD  Attending Physician, [facility]

## 2023-01-20 NOTE — PROGRESS NOTE ADULT - ASSESSMENT
REVIEW OF SYMPTOMS      Able to give (reliable) ROS  NO     PHYSICAL EXAM    HEENT Unremarkable  atraumatic   RESP Fair air entry EXP prolonged    Harsh breath sound Resp distres mild   CARDIAC S1 S2 No S3     NO JVD    ABDOMEN SOFT BS PRESENT NOT DISTENDED No hepatosplenomegaly   PEDAL EDEMA present No calf tenderness  NO rash       GENERAL DATA .   GOC.  12/11/2022 full code       ALLGY.  nka                            WT. ..  12/2/2022 86  BMI. ..    12/2/2022 29                          ICU STAY.  .. 11/29-12/9  COVID.   .. 12/2/2022 scv2 (-)   .. 11/20/2022 scv2 (-)   BEST PRACTICE ISSUES.    HOB ELEVATN. Yes  DVT PPLX.    .. 1/3/2023 apixa 5.2 (vte)  12/12 l Subclav throm  ALEJO PPLX.   ..      INFN PPLX.   .. 11/25 chlorhex .12%   .. 11/14 chlorhex 2%   SP SW ANTWAN.         DIET.    ..  12/15 glucerna 1.2 1440 gt   IV fl.  ..            PROCEDURES.  .. 12/19 debridement of sacral wound   .. 12/5/2022 trach    ABGS.  1/6/2023 ac 16/450/.3/5 746/49/123     VS/ PO/IO/ VENT/ DRIPS.  1/20/2023 afeb 60 110/50   1/19/2023 afeb 70 100/50   1/20/2023 tc 35%     PATIENT PRESENTATION.  72 m doa 11/14/2022 cac    PMH  PMH CAD s/p PCI with stent 2015 and CABG 2014,   PMH  s/p medtronic PPM,   PMH CVA (lacunar infarct)    HOSPITAL COURSE   .. 1) PEA arrest with ROSC after approximately 5 min 11/14  Now communicative   .. 2) DVT 12/12 l Subclav thromS 12/15/2022 lvnx 80.2 1/3 changed to apixaban 5.2  .. 3) TRACH 12/5/2022 trach  .. 4) PEG12/5/2022 peg   .. 5) Cellulitis hand absc 12/13 mod enterococcus fecalis  staph epi 12/12-12/15/2022  cephalexin 12/15 vanco once  12/16-12/19 zosyn  .. 6) Vent weaning     PROBLEM ASSESSMENT RECOMMENDATIONS.  WEANING   .. wean as tolerated  .. 1/4/2022  If able to tolerate cpap 5/5 x 2 h will place on tc   .. 1/5/2023 JOSE J Valdovinos Tried on 35% tc   .. 1/6/2023 DW RT Pt to be on monitor during weaning trials and to be placed on vent at night and trach collar during daytime as tolerated   .. 1/7/2023 above reinforced to RT   .. 1/8/2023 placed on trach collar but has lot of secretns lasted 45 min   1/11/2023 tolerated tc several h  1/15/2023 Has been tolerating day time trach collar   TRACH LEAK  .. 1/9/2023 Notified by Hospitalist that pt is losing volunmes and that she is calling surgery to see if trach balloon has leak  HEMOPTYSIS  .. 1/13/2023 Pt having mild hemoptysis last 2 d   .. w 1/13-1/14/2023 w 10.4- 8.6   .. Hb 1/13/2023 Hb 9.7   .. Cr 1/13/2023 Cr .9   .. ct 1/13/2023 ct ch cw 1/4/2020 Stable bectasis and cystic changes basal l lower lobe and lingula Stable ssa r base   .. 1/13/2023 Hemoptysis likely sec to suctioning trauma in setting of full dose ac   .. 1/13/2023 VTE unlikely as cause of ac as pt is on fd ac   .. 1/13/2023 was eval by surgrey no trach bleed as dw Dr Arzate   .. 1/13/2023 infection is possibe cause sergio given bectasis so if persists will consider abio   .. 1/20/2023 dw son will try cpap overnight and escalate to ac if resp distress   INFECTION.  .. w 1/17-1/18-1/20/2023 w 8.3- 7.9 - 6.8    .. pr 1/17-1/20/2023 (-) - (-)   .. 1/16-1/20/2023 Rocephin started by hospitalist dced   CHF   .. 11/14/2022 echo mod decr segmental lvsf apical lateral apiccal ant segment are abn takotsubo cmpthy pasp 42 .  .. Monitor for chf has chr hfref per echo   COPD   .. 12/30 symbicort 160   .. 1/7/2023 glycopyrrolate 1.2   .. 1/9/2023 ipratropium  .. 1/15/2023 scopolamine   .. continue bd ics for copd   Anemia.  .. Hb 1/12-1/14-1/19-1/20/2023 Hb 9.8- 10 -9.1- 9.8    .. target hb 7 (+)  .. monitor   sp cac   .. good neuro recovery awake alert interactive   VTE  .. 1/3/2023 apixa 5.2 (vte)      OVERALL   WEAN as told 1/20/2023 to try noct cpap and day time tc (which he has been tolerating  HEMOPTYSIS Consider abio if persists  TRACH LEAK If persists trach ch  ROCEPHIN Started 1/16/2023 by hospitalist would limit to 5 d course  DCed 1/19  FLAILING OF LOWER EXTR started 1/16 on depakote   suggest Neuro followup 1/19/2023 myoclonic jerks improvd       TIME SPENT   Over 25 minutes aggregate care time spent on encounter; activities included   direct patient care, counseling and/or coordinating care reviewing notes, lab data/ imaging , discussion with multidisciplinary team/ patient  /family and explaining in detail risks, benefits, alternatives  of the recommendations     BRANDON CAMARILLO

## 2023-01-20 NOTE — PROGRESS NOTE ADULT - SUBJECTIVE AND OBJECTIVE BOX
Patient is a 72y old  Male who presents with a chief complaint of s/p cardiac arrest, hypoglycemia (19 Jan 2023 08:12)    INTERVAL HPI/OVERNIGHT EVENTS:   none  SUBJECTIVE: no complaints of pain/discomfort/dyspnea    MEDICATIONS  (STANDING):  albuterol/ipratropium for Nebulization 3 milliLiter(s) Nebulizer every 6 hours  apixaban 5 milliGRAM(s) Oral every 12 hours  aspirin  chewable 81 milliGRAM(s) Oral daily  atorvastatin 20 milliGRAM(s) Oral at bedtime  bacitracin   Ointment 1 Application(s) Topical two times a day  budesonide 160 MICROgram(s)/formoterol 4.5 MICROgram(s) Inhaler 2 Puff(s) Inhalation two times a day  cefTRIAXone   IVPB 1000 milliGRAM(s) IV Intermittent every 24 hours  chlorhexidine 0.12% Liquid 15 milliLiter(s) Oral Mucosa every 12 hours  chlorhexidine 2% Cloths 1 Application(s) Topical <User Schedule>  collagenase Ointment 1 Application(s) Topical two times a day  cyanocobalamin 1000 MICROGram(s) Oral daily  diphenhydrAMINE Injectable 50 milliGRAM(s) IV Push once  folic acid 1 milliGRAM(s) Oral daily  glycopyrrolate 1 milliGRAM(s) Oral two times a day  insulin glargine Injectable (LANTUS) 20 Unit(s) SubCutaneous every morning  insulin lispro (ADMELOG) corrective regimen sliding scale   SubCutaneous every 6 hours  levETIRAcetam 1500 milliGRAM(s) Oral two times a day  metoprolol tartrate 25 milliGRAM(s) Oral two times a day  polyethylene glycol 3350 17 Gram(s) Oral daily  saline laxative (FLEET) Rectal Enema 1 Enema Rectal once  scopolamine 1 mG/72 Hr(s) Patch 1 Patch Transdermal every 72 hours  senna 2 Tablet(s) Oral at bedtime  silver sulfADIAZINE 1% Cream 1 Application(s) Topical two times a day  valproic  acid Syrup 500 milliGRAM(s) Oral <User Schedule>    MEDICATIONS  (PRN):  acetaminophen     Tablet .. 650 milliGRAM(s) Oral every 6 hours PRN Temp greater or equal to 38C (100.4F), Mild Pain (1 - 3)  albuterol    90 MICROgram(s) HFA Inhaler 2 Puff(s) Inhalation every 6 hours PRN Shortness of Breath and/or Wheezing  aluminum hydroxide/magnesium hydroxide/simethicone Suspension 30 milliLiter(s) Oral every 4 hours PRN Dyspepsia  oxycodone    5 mG/acetaminophen 325 mG 1 Tablet(s) Oral every 4 hours PRN Moderate Pain (4 - 6)    Allergies    No Known Allergies    Intolerances    Vital Signs Last 24 Hrs  T(C): 36.7 (20 Jan 2023 04:41), Max: 38 (19 Jan 2023 11:46)  T(F): 98 (20 Jan 2023 04:41), Max: 100.4 (19 Jan 2023 11:46)  HR: 68 (20 Jan 2023 05:00) (59 - 74)  BP: 134/76 (20 Jan 2023 04:41) (108/58 - 134/76)  BP(mean): --  RR: 18 (20 Jan 2023 04:41) (18 - 18)  SpO2: 100% (20 Jan 2023 05:00) (96% - 100%)    Parameters below as of 19 Jan 2023 11:38  Patient On (Oxygen Delivery Method): tracheostomy collar    PHYSICAL EXAM:  GENERAL: NAD, well-groomed, well-developed  HEAD:  Atraumatic, Normocephalic  EYES: EOMI, PERRLA, conjunctiva and sclera clear  ENMT: No tonsillar erythema, exudates, or enlargement; Moist mucous membranes, Good dentition, No lesions  NECK: Supple, No JVD, Normal thyroid  NERVOUS SYSTEM:  Alert & Oriented X3, Good concentration; able to follow direction   CHEST/LUNG: Clear to percussion bilaterally; No rales, rhonchi, wheezing, or rubs  HEART: Regular rate and rhythm; No murmurs, rubs, or gallops  ABDOMEN: Soft, Nontender, Nondistended; Bowel sounds present  EXTREMITIES:  2+ Peripheral Pulses, No clubbing, cyanosis, or edema  SKIN: No rashes or lesions        Labs                                         9.8    6.86  )-----------( 270      ( 20 Jan 2023 07:50 )             30.9   01-20    144  |  109<H>  |  31<H>  ----------------------------<  147<H>  3.9   |  28  |  0.96    Ca    8.8      20 Jan 2023 07:50  Phos  2.6     01-20  Mg     2.4     01-20    TPro  6.3  /  Alb  2.2<L>  /  TBili  0.2  /  DBili  x   /  AST  9<L>  /  ALT  17  /  AlkPhos  82  01-20      CAPILLARY BLOOD GLUCOSE      POCT Blood Glucose.: 154 mg/dL (20 Jan 2023 07:46)  POCT Blood Glucose.: 162 mg/dL (20 Jan 2023 05:06)  POCT Blood Glucose.: 183 mg/dL (20 Jan 2023 00:27)  POCT Blood Glucose.: 126 mg/dL (19 Jan 2023 21:00)  POCT Blood Glucose.: 107 mg/dL (19 Jan 2023 16:45)  POCT Blood Glucose.: 168 mg/dL (19 Jan 2023 11:27)   Patient is a 72y old  Male who presents with a chief complaint of s/p cardiac arrest, hypoglycemia (19 Jan 2023 08:12)    INTERVAL HPI/OVERNIGHT EVENTS:   none  SUBJECTIVE: no complaints of pain/discomfort/dyspnea    MEDICATIONS  (STANDING):  albuterol/ipratropium for Nebulization 3 milliLiter(s) Nebulizer every 6 hours  apixaban 5 milliGRAM(s) Oral every 12 hours  aspirin  chewable 81 milliGRAM(s) Oral daily  atorvastatin 20 milliGRAM(s) Oral at bedtime  bacitracin   Ointment 1 Application(s) Topical two times a day  budesonide 160 MICROgram(s)/formoterol 4.5 MICROgram(s) Inhaler 2 Puff(s) Inhalation two times a day  cefTRIAXone   IVPB 1000 milliGRAM(s) IV Intermittent every 24 hours  chlorhexidine 0.12% Liquid 15 milliLiter(s) Oral Mucosa every 12 hours  chlorhexidine 2% Cloths 1 Application(s) Topical <User Schedule>  collagenase Ointment 1 Application(s) Topical two times a day  cyanocobalamin 1000 MICROGram(s) Oral daily  diphenhydrAMINE Injectable 50 milliGRAM(s) IV Push once  folic acid 1 milliGRAM(s) Oral daily  glycopyrrolate 1 milliGRAM(s) Oral two times a day  insulin glargine Injectable (LANTUS) 20 Unit(s) SubCutaneous every morning  insulin lispro (ADMELOG) corrective regimen sliding scale   SubCutaneous every 6 hours  levETIRAcetam 1500 milliGRAM(s) Oral two times a day  metoprolol tartrate 25 milliGRAM(s) Oral two times a day  polyethylene glycol 3350 17 Gram(s) Oral daily  saline laxative (FLEET) Rectal Enema 1 Enema Rectal once  scopolamine 1 mG/72 Hr(s) Patch 1 Patch Transdermal every 72 hours  senna 2 Tablet(s) Oral at bedtime  silver sulfADIAZINE 1% Cream 1 Application(s) Topical two times a day  valproic  acid Syrup 500 milliGRAM(s) Oral <User Schedule>    MEDICATIONS  (PRN):  acetaminophen     Tablet .. 650 milliGRAM(s) Oral every 6 hours PRN Temp greater or equal to 38C (100.4F), Mild Pain (1 - 3)  albuterol    90 MICROgram(s) HFA Inhaler 2 Puff(s) Inhalation every 6 hours PRN Shortness of Breath and/or Wheezing  aluminum hydroxide/magnesium hydroxide/simethicone Suspension 30 milliLiter(s) Oral every 4 hours PRN Dyspepsia  oxycodone    5 mG/acetaminophen 325 mG 1 Tablet(s) Oral every 4 hours PRN Moderate Pain (4 - 6)    Allergies    No Known Allergies    Intolerances    Vital Signs Last 24 Hrs  T(C): 36.7 (20 Jan 2023 04:41), Max: 38 (19 Jan 2023 11:46)  T(F): 98 (20 Jan 2023 04:41), Max: 100.4 (19 Jan 2023 11:46)  HR: 68 (20 Jan 2023 05:00) (59 - 74)  BP: 134/76 (20 Jan 2023 04:41) (108/58 - 134/76)  BP(mean): --  RR: 18 (20 Jan 2023 04:41) (18 - 18)  SpO2: 100% (20 Jan 2023 05:00) (96% - 100%)    Parameters below as of 19 Jan 2023 11:38  Patient On (Oxygen Delivery Method): tracheostomy collar    PHYSICAL EXAM:  GENERAL: NAD, well-groomed, well-developed  HEAD:  Atraumatic, Normocephalic  EYES: EOMI, PERRLA, conjunctiva and sclera clear  ENMT: No tonsillar erythema, exudates, or enlargement; Moist mucous membranes, Good dentition, No lesions  NECK: Supple,   NERVOUS SYSTEM: sleeping    CHEST/LUNG: Clear to percussion bilaterally; No rales, rhonchi, wheezing, or rubs  HEART: Regular rate and rhythm; No murmurs, rubs, or gallops  ABDOMEN: Soft, Nontender, Nondistended; Bowel sounds present + peg  EXTREMITIES:  2+ Peripheral Pulses, No clubbing, cyanosis, or edema  SKIN: stage 3 sacral , skin tear to face where EEG leads        Labs                                         9.8    6.86  )-----------( 270      ( 20 Jan 2023 07:50 )             30.9   01-20    144  |  109<H>  |  31<H>  ----------------------------<  147<H>  3.9   |  28  |  0.96    Ca    8.8      20 Jan 2023 07:50  Phos  2.6     01-20  Mg     2.4     01-20    TPro  6.3  /  Alb  2.2<L>  /  TBili  0.2  /  DBili  x   /  AST  9<L>  /  ALT  17  /  AlkPhos  82  01-20      CAPILLARY BLOOD GLUCOSE      POCT Blood Glucose.: 154 mg/dL (20 Jan 2023 07:46)  POCT Blood Glucose.: 162 mg/dL (20 Jan 2023 05:06)  POCT Blood Glucose.: 183 mg/dL (20 Jan 2023 00:27)  POCT Blood Glucose.: 126 mg/dL (19 Jan 2023 21:00)  POCT Blood Glucose.: 107 mg/dL (19 Jan 2023 16:45)  POCT Blood Glucose.: 168 mg/dL (19 Jan 2023 11:27)

## 2023-01-20 NOTE — CHART NOTE - NSCHARTNOTEFT_GEN_A_CORE
Neurology Chart Note:    Son at bedside reports improvement in myoclonus, less frequent, since addition of Depakote.  VPA level = 49.  Recommend increasing dose of Depakote ER to 750mg BID, and repeat VPA level in 24 - 48 hours (target ).  Discontinue VEEG monitoring, can monitor and treat clinically.    Mark Loo MD  Neurology Attending Physician

## 2023-01-20 NOTE — EEG REPORT - NS EEG TEXT BOX
BRANDON CAMARILLO [id number]     Study Date: 01/20/23 08:00 - 01/20/23 13:58  Duration: 5h 57m  --------------------------------------------------------------------------------------------------  History:  CC/ HPI Patient is a 72y old  Male who presents with a chief complaint of s/p cardiac arrest, hypoglycemia (20 Jan 2023 11:34)    MEDICATIONS  (STANDING):  albuterol/ipratropium for Nebulization 3 milliLiter(s) Nebulizer every 6 hours  apixaban 5 milliGRAM(s) Oral every 12 hours  aspirin  chewable 81 milliGRAM(s) Oral daily  atorvastatin 20 milliGRAM(s) Oral at bedtime  bacitracin   Ointment 1 Application(s) Topical two times a day  budesonide 160 MICROgram(s)/formoterol 4.5 MICROgram(s) Inhaler 2 Puff(s) Inhalation two times a day  cefTRIAXone   IVPB 1000 milliGRAM(s) IV Intermittent every 24 hours  chlorhexidine 0.12% Liquid 15 milliLiter(s) Oral Mucosa every 12 hours  chlorhexidine 2% Cloths 1 Application(s) Topical <User Schedule>  collagenase Ointment 1 Application(s) Topical two times a day  cyanocobalamin 1000 MICROGram(s) Oral daily  diphenhydrAMINE Injectable 50 milliGRAM(s) IV Push once  folic acid 1 milliGRAM(s) Oral daily  glycopyrrolate 1 milliGRAM(s) Oral two times a day  insulin glargine Injectable (LANTUS) 20 Unit(s) SubCutaneous every morning  insulin lispro (ADMELOG) corrective regimen sliding scale   SubCutaneous every 6 hours  levETIRAcetam 1500 milliGRAM(s) Oral two times a day  metoprolol tartrate 25 milliGRAM(s) Oral two times a day  polyethylene glycol 3350 17 Gram(s) Oral daily  saline laxative (FLEET) Rectal Enema 1 Enema Rectal once  scopolamine 1 mG/72 Hr(s) Patch 1 Patch Transdermal every 72 hours  senna 2 Tablet(s) Oral at bedtime  silver sulfADIAZINE 1% Cream 1 Application(s) Topical two times a day  valproic  acid Syrup 500 milliGRAM(s) Oral <User Schedule>    --------------------------------------------------------------------------------------------------  Study Interpretation:    [[[Abbreviation Key:  PDR=alpha rhythm/posterior dominant rhythm. A-P=anterior posterior.  Amplitude: ‘very low’:<20; ‘low’:20-49; ‘medium’:; ‘high’:>150uV.  Persistence for periodic/rhythmic patterns (% of epoch) ‘rare’:<1%; ‘occasional’:1-10%; ‘frequent’:10-50%; ‘abundant’:50-90%; ‘continuous’:>90%.  Persistence for sporadic discharges: ‘rare’:<1/hr; ‘occasional’:1/min-1/hr; ‘frequent’:>1/min; ‘abundant’:>1/10 sec.  RPP=rhythmic and periodic patterns; GRDA=generalized rhythmic delta activity; FIRDA=frontal intermittent GRDA; LRDA=lateralized rhythmic delta activity; TIRDA=temporal intermittent rhythmic delta activity;  LPD=PLED=lateralized periodic discharges; GPD=generalized periodic discharges; BIPDs =bilateral independent periodic discharges; Mf=multifocal; SIRPDs=stimulus induced rhythmic, periodic, or ictal appearing discharges; BIRDs=brief potentially ictal rhythmic discharges >4 Hz, lasting .5-10s; PFA (paroxysmal bursts >13 Hz or =8 Hz <10s).  Modifiers: +F=with fast component; +S=with spike component; +R=with rhythmic component.  S-B=burst suppression pattern.  Max=maximal. N1-drowsy; N2-stage II sleep; N3-slow wave sleep. SSS/BETS=small sharp spikes/benign epileptiform transients of sleep. HV=hyperventilation; PS=photic stimulation]]]    Daily EEG Visual Analysis    FINDINGS:      Background:  Continuity: Continuous  Symmetry: Symmetric  Posterior dominant rhythm (PDR): 7.5-8 Hz, reactive to eye closure. Symmetric low-amplitude frontal beta in wakefulness.  State Change: Present  Voltage: Normal  Anterior Posterior Gradient: Present  Other background findings: None  Breach: Absent    Background Slowing:  Generalized slowing: Mild  Focal slowing: None    State Changes:   Drowsiness is characterized by fragmentation, attenuation, and slowing of the background frequencies.  Stage 2 sleep is characterized by symmetric vertex waves and occasional K complexes.      Sporadic Epileptiform Discharges and Rhythmic and Periodic Patterns (RPPs):  Frequent central (Cz, at times involving C3 and/or C4) polyspike-wave discharges occurring in bursts and runs at 1-3.5 Hz without clear evolution and without clinical correlate.    Electrographic and Electroclinical seizures:  Frequent myoclonic seizures: jerking of abdomen, BLE (such as b/l hip adduction jerks), and/or trunk correlating with central polyspike-wave discharges (Cz, at times involving C3 and/or C4). The patient is awake and making purposeful movements or interacting with staff during some jerking episodes. Occasionally, there is a brief abdominal jerk with a seizure during drowsiness or sleep. Occasionally, seizures are electrographic (no clinical correlate), such as during drowsiness or sleep.    Other Clinical Events:  None    Activation Procedures:   Hyperventilation was not performed.    Photic stimulation was not performed.    Artifacts:  Intermittent myogenic and movement artifacts are present.    EKG:  Single-lead EKG shows regular rhythm at 70-90 bpm.    EEG Classification / Summary:  Abnormal video-EEG in the awake, drowsy, and asleep states due to:  -Frequent myoclonic seizures. The patient is awake and making purposeful movements or interacting with staff during some of the seizures; other seizures occur during drowsiness or sleep; other seizures are electrographic (no clinical correlate), such as during drowsiness or sleep.  -Frequent bursts and runs of central polyspike-wave discharges without clear evolution and without clinical correlate  -Mild diffuse slowing    Clinical Impression:  -Frequent myoclonic seizures  -Frequent bursts and runs of central polyspike-wave discharges indicate risk of further myoclonic seizures from the central region  -Mild diffuse cerebral dysfunction of nonspecific etiology    Findings are overall similar to the prior day.  In the absence of further clinical concern, consider disconnecting study with reconnection in the future if indicated.        -------------------------------------------------------------------------------------------------------  Libia Peck MD  Attending Physician, [facility] [place]

## 2023-01-21 LAB
ANION GAP SERPL CALC-SCNC: 5 MMOL/L — SIGNIFICANT CHANGE UP (ref 5–17)
BUN SERPL-MCNC: 28 MG/DL — HIGH (ref 7–23)
CALCIUM SERPL-MCNC: 8.8 MG/DL — SIGNIFICANT CHANGE UP (ref 8.5–10.1)
CHLORIDE SERPL-SCNC: 107 MMOL/L — SIGNIFICANT CHANGE UP (ref 96–108)
CO2 SERPL-SCNC: 31 MMOL/L — SIGNIFICANT CHANGE UP (ref 22–31)
CREAT SERPL-MCNC: 0.86 MG/DL — SIGNIFICANT CHANGE UP (ref 0.5–1.3)
EGFR: 91 ML/MIN/1.73M2 — SIGNIFICANT CHANGE UP
GLUCOSE BLDC GLUCOMTR-MCNC: 129 MG/DL — HIGH (ref 70–99)
GLUCOSE BLDC GLUCOMTR-MCNC: 130 MG/DL — HIGH (ref 70–99)
GLUCOSE BLDC GLUCOMTR-MCNC: 141 MG/DL — HIGH (ref 70–99)
GLUCOSE BLDC GLUCOMTR-MCNC: 144 MG/DL — HIGH (ref 70–99)
GLUCOSE BLDC GLUCOMTR-MCNC: 59 MG/DL — LOW (ref 70–99)
GLUCOSE BLDC GLUCOMTR-MCNC: 60 MG/DL — LOW (ref 70–99)
GLUCOSE BLDC GLUCOMTR-MCNC: 90 MG/DL — SIGNIFICANT CHANGE UP (ref 70–99)
GLUCOSE SERPL-MCNC: 135 MG/DL — HIGH (ref 70–99)
HCT VFR BLD CALC: 29.4 % — LOW (ref 39–50)
HGB BLD-MCNC: 9.2 G/DL — LOW (ref 13–17)
MAGNESIUM SERPL-MCNC: 2.3 MG/DL — SIGNIFICANT CHANGE UP (ref 1.6–2.6)
MCHC RBC-ENTMCNC: 29.9 PG — SIGNIFICANT CHANGE UP (ref 27–34)
MCHC RBC-ENTMCNC: 31.3 G/DL — LOW (ref 32–36)
MCV RBC AUTO: 95.5 FL — SIGNIFICANT CHANGE UP (ref 80–100)
NRBC # BLD: 0 /100 WBCS — SIGNIFICANT CHANGE UP (ref 0–0)
PHOSPHATE SERPL-MCNC: 3 MG/DL — SIGNIFICANT CHANGE UP (ref 2.5–4.5)
PLATELET # BLD AUTO: 267 K/UL — SIGNIFICANT CHANGE UP (ref 150–400)
POTASSIUM SERPL-MCNC: 4 MMOL/L — SIGNIFICANT CHANGE UP (ref 3.5–5.3)
POTASSIUM SERPL-SCNC: 4 MMOL/L — SIGNIFICANT CHANGE UP (ref 3.5–5.3)
RBC # BLD: 3.08 M/UL — LOW (ref 4.2–5.8)
RBC # FLD: 14.6 % — HIGH (ref 10.3–14.5)
SODIUM SERPL-SCNC: 143 MMOL/L — SIGNIFICANT CHANGE UP (ref 135–145)
WBC # BLD: 7.23 K/UL — SIGNIFICANT CHANGE UP (ref 3.8–10.5)
WBC # FLD AUTO: 7.23 K/UL — SIGNIFICANT CHANGE UP (ref 3.8–10.5)

## 2023-01-21 PROCEDURE — 99232 SBSQ HOSP IP/OBS MODERATE 35: CPT

## 2023-01-21 RX ORDER — DEXTROSE 50 % IN WATER 50 %
12.5 SYRINGE (ML) INTRAVENOUS ONCE
Refills: 0 | Status: DISCONTINUED | OUTPATIENT
Start: 2023-01-21 | End: 2023-03-18

## 2023-01-21 RX ORDER — INSULIN LISPRO 100/ML
VIAL (ML) SUBCUTANEOUS AT BEDTIME
Refills: 0 | Status: DISCONTINUED | OUTPATIENT
Start: 2023-01-21 | End: 2023-01-29

## 2023-01-21 RX ORDER — DEXTROSE 50 % IN WATER 50 %
15 SYRINGE (ML) INTRAVENOUS ONCE
Refills: 0 | Status: DISCONTINUED | OUTPATIENT
Start: 2023-01-21 | End: 2023-03-18

## 2023-01-21 RX ORDER — GLUCAGON INJECTION, SOLUTION 0.5 MG/.1ML
1 INJECTION, SOLUTION SUBCUTANEOUS ONCE
Refills: 0 | Status: DISCONTINUED | OUTPATIENT
Start: 2023-01-21 | End: 2023-03-18

## 2023-01-21 RX ORDER — SODIUM CHLORIDE 9 MG/ML
1000 INJECTION, SOLUTION INTRAVENOUS
Refills: 0 | Status: DISCONTINUED | OUTPATIENT
Start: 2023-01-21 | End: 2023-03-18

## 2023-01-21 RX ORDER — DEXTROSE 50 % IN WATER 50 %
25 SYRINGE (ML) INTRAVENOUS ONCE
Refills: 0 | Status: DISCONTINUED | OUTPATIENT
Start: 2023-01-21 | End: 2023-03-18

## 2023-01-21 RX ORDER — INSULIN GLARGINE 100 [IU]/ML
10 INJECTION, SOLUTION SUBCUTANEOUS AT BEDTIME
Refills: 0 | Status: DISCONTINUED | OUTPATIENT
Start: 2023-01-21 | End: 2023-03-10

## 2023-01-21 RX ORDER — INSULIN LISPRO 100/ML
VIAL (ML) SUBCUTANEOUS
Refills: 0 | Status: DISCONTINUED | OUTPATIENT
Start: 2023-01-21 | End: 2023-01-29

## 2023-01-21 RX ADMIN — BUDESONIDE AND FORMOTEROL FUMARATE DIHYDRATE 2 PUFF(S): 160; 4.5 AEROSOL RESPIRATORY (INHALATION) at 05:18

## 2023-01-21 RX ADMIN — LEVETIRACETAM 1500 MILLIGRAM(S): 250 TABLET, FILM COATED ORAL at 06:27

## 2023-01-21 RX ADMIN — Medication 81 MILLIGRAM(S): at 11:42

## 2023-01-21 RX ADMIN — CHLORHEXIDINE GLUCONATE 15 MILLILITER(S): 213 SOLUTION TOPICAL at 06:27

## 2023-01-21 RX ADMIN — POLYETHYLENE GLYCOL 3350 17 GRAM(S): 17 POWDER, FOR SOLUTION ORAL at 11:40

## 2023-01-21 RX ADMIN — Medication 25 MILLIGRAM(S): at 06:28

## 2023-01-21 RX ADMIN — CHLORHEXIDINE GLUCONATE 1 APPLICATION(S): 213 SOLUTION TOPICAL at 06:27

## 2023-01-21 RX ADMIN — BUDESONIDE AND FORMOTEROL FUMARATE DIHYDRATE 2 PUFF(S): 160; 4.5 AEROSOL RESPIRATORY (INHALATION) at 17:25

## 2023-01-21 RX ADMIN — CHLORHEXIDINE GLUCONATE 15 MILLILITER(S): 213 SOLUTION TOPICAL at 17:58

## 2023-01-21 RX ADMIN — Medication 1 APPLICATION(S): at 17:53

## 2023-01-21 RX ADMIN — Medication 3 MILLILITER(S): at 11:42

## 2023-01-21 RX ADMIN — APIXABAN 5 MILLIGRAM(S): 2.5 TABLET, FILM COATED ORAL at 17:53

## 2023-01-21 RX ADMIN — ROBINUL 1 MILLIGRAM(S): 0.2 INJECTION INTRAMUSCULAR; INTRAVENOUS at 17:55

## 2023-01-21 RX ADMIN — LEVETIRACETAM 1500 MILLIGRAM(S): 250 TABLET, FILM COATED ORAL at 17:52

## 2023-01-21 RX ADMIN — Medication 1 APPLICATION(S): at 06:28

## 2023-01-21 RX ADMIN — INSULIN GLARGINE 10 UNIT(S): 100 INJECTION, SOLUTION SUBCUTANEOUS at 21:21

## 2023-01-21 RX ADMIN — Medication 750 MILLIGRAM(S): at 08:23

## 2023-01-21 RX ADMIN — Medication 25 MILLIGRAM(S): at 17:56

## 2023-01-21 RX ADMIN — Medication 1 MILLIGRAM(S): at 11:41

## 2023-01-21 RX ADMIN — ATORVASTATIN CALCIUM 20 MILLIGRAM(S): 80 TABLET, FILM COATED ORAL at 21:22

## 2023-01-21 RX ADMIN — PREGABALIN 1000 MICROGRAM(S): 225 CAPSULE ORAL at 11:42

## 2023-01-21 RX ADMIN — Medication 3 MILLILITER(S): at 17:24

## 2023-01-21 RX ADMIN — SENNA PLUS 2 TABLET(S): 8.6 TABLET ORAL at 21:21

## 2023-01-21 RX ADMIN — Medication 3 MILLILITER(S): at 05:18

## 2023-01-21 RX ADMIN — INSULIN GLARGINE 20 UNIT(S): 100 INJECTION, SOLUTION SUBCUTANEOUS at 08:21

## 2023-01-21 RX ADMIN — APIXABAN 5 MILLIGRAM(S): 2.5 TABLET, FILM COATED ORAL at 06:28

## 2023-01-21 RX ADMIN — Medication 1 TABLET(S): at 06:28

## 2023-01-21 RX ADMIN — SCOPALAMINE 1 PATCH: 1 PATCH, EXTENDED RELEASE TRANSDERMAL at 19:23

## 2023-01-21 RX ADMIN — Medication 750 MILLIGRAM(S): at 21:22

## 2023-01-21 RX ADMIN — Medication 1 APPLICATION(S): at 17:58

## 2023-01-21 RX ADMIN — ROBINUL 1 MILLIGRAM(S): 0.2 INJECTION INTRAMUSCULAR; INTRAVENOUS at 06:28

## 2023-01-21 RX ADMIN — Medication 1 APPLICATION(S): at 06:29

## 2023-01-21 NOTE — PROGRESS NOTE ADULT - ASSESSMENT
REVIEW OF SYMPTOMS      Able to give (reliable) ROS  NO     PHYSICAL EXAM    HEENT Unremarkable  atraumatic   RESP Fair air entry EXP prolonged    Harsh breath sound Resp distres mild   CARDIAC S1 S2 No S3     NO JVD    ABDOMEN SOFT BS PRESENT NOT DISTENDED No hepatosplenomegaly   PEDAL EDEMA present No calf tenderness  NO rash       GENERAL DATA .   GOC.  12/11/2022 full code       ALLGY.  nka                            WT. ..  12/2/2022 86  BMI. ..    12/2/2022 29                          ICU STAY.  .. 11/29-12/9  COVID.   .. 12/2/2022 scv2 (-)   .. 11/20/2022 scv2 (-)   BEST PRACTICE ISSUES.    HOB ELEVATN. Yes  DVT PPLX.    .. 1/3/2023 apixa 5.2 (vte)  12/12 l Subclav throm  ALEJO PPLX.   ..      INFN PPLX.   .. 11/25 chlorhex .12%   .. 11/14 chlorhex 2%   SP SW ANTWAN.         DIET.    ..  12/15 glucerna 1.2 1440 gt   IV fl.  ..            PROCEDURES.  .. 12/19 debridement of sacral wound   .. 12/5/2022 trach    ABGS.  1/6/2023 ac 16/450/.3/5 746/49/123     VS/ PO/IO/ VENT/ DRIPS.  1/21/2023 99f 65 110/70     PATIENT PRESENTATION.  72 m doa 11/14/2022 cac    PMH  PMH CAD s/p PCI with stent 2015 and CABG 2014,   PMH  s/p medtronic PPM,   PMH CVA (lacunar infarct)    HOSPITAL COURSE   .. 1) PEA arrest with ROSC after approximately 5 min 11/14  Now communicative   .. 2) DVT 12/12 l Subclav thromS 12/15/2022 lvnx 80.2 1/3 changed to apixaban 5.2  .. 3) TRACH 12/5/2022 trach  .. 4) PEG12/5/2022 peg   .. 5) Cellulitis hand absc 12/13 mod enterococcus fecalis  staph epi 12/12-12/15/2022  cephalexin 12/15 vanco once  12/16-12/19 zosyn  .. 6) Vent weaning     PROBLEM ASSESSMENT RECOMMENDATIONS.  WEANING   .. wean as tolerated  .. 1/4/2022  If able to tolerate cpap 5/5 x 2 h will place on tc   .. 1/5/2023 JOSE J Valdovinos Tried on 35% tc   .. 1/6/2023 DW RT Pt to be on monitor during weaning trials and to be placed on vent at night and trach collar during daytime as tolerated   .. 1/7/2023 above reinforced to RT   .. 1/8/2023 placed on trach collar but has lot of secretns lasted 45 min   1/11/2023 tolerated tc several h  1/15/2023 Has been tolerating day time trach collar   TRACH LEAK  .. 1/9/2023 Notified by Hospitalist that pt is losing volunmes and that she is calling surgery to see if trach balloon has leak  HEMOPTYSIS  .. 1/13/2023 Pt having mild hemoptysis last 2 d   .. w 1/13-1/14/2023 w 10.4- 8.6   .. Hb 1/13/2023 Hb 9.7   .. Cr 1/13/2023 Cr .9   .. ct 1/13/2023 ct ch cw 1/4/2020 Stable bectasis and cystic changes basal l lower lobe and lingula Stable ssa r base   .. 1/13/2023 Hemoptysis likely sec to suctioning trauma in setting of full dose ac   .. 1/13/2023 VTE unlikely as cause of ac as pt is on fd ac   .. 1/13/2023 was eval by surgrey no trach bleed as dw Dr Arzate   .. 1/13/2023 infection is possibe cause sergio given bectasis so if persists will consider abio   .. 1/20/2023 dw son will try cpap overnight and escalate to ac if resp distress   INFECTION.  .. w 1/17-1/18-1/20-1/21/2023 w 8.3- 7.9 - 6.8 - 7.2    .. pr 1/17-1/20/2023 (-) - (-)   .. 1/16-1/20/2023 Rocephin started by hospitalist dced   CHF   .. 11/14/2022 echo mod decr segmental lvsf apical lateral apiccal ant segment are abn takotsubo cmpthy pasp 42 .  .. Monitor for chf has chr hfref per echo   COPD   .. 12/30 symbicort 160   .. 1/7/2023 glycopyrrolate 1.2   .. 1/9/2023 ipratropium  .. 1/15/2023 scopolamine   .. continue bd ics for copd   Anemia.  .. Hb 1/12-1/14-1/19-1/20-7/21/2023       Hb 9.8- 10 -9.1- 9.8  - 9.2   .. target hb 7 (+)  .. monitor   sp cac   .. good neuro recovery awake alert interactive   VTE  .. 1/3/2023 apixa 5.2 (vte)      OVERALL   WEAN as told 1/20/2023 to try noct cpap and day time tc (which he has been tolerating  HEMOPTYSIS Consider abio if persists  TRACH LEAK If persists trach ch  ROCEPHIN Started 1/16/2023 by hospitalist would limit to 5 d course  DCed 1/19  FLAILING OF LOWER EXTR started 1/16 on depakote   suggest Neuro followup 1/19/2023 myoclonic jerks improvd       TIME SPENT   Over 25 minutes aggregate care time spent on encounter; activities included   direct patient care, counseling and/or coordinating care reviewing notes, lab data/ imaging , discussion with multidisciplinary team/ patient  /family and explaining in detail risks, benefits, alternatives  of the recommendations     BRANDON CAMARILLO

## 2023-01-21 NOTE — PHYSICAL THERAPY INITIAL EVALUATION ADULT - PERTINENT HX OF CURRENT PROBLEM, REHAB EVAL
71 yo male w/ pmhx of COPD, CAD sp PCI w/ stent, CABG 2014, HF w/ PeF, 2nd degree heart block, sp PPM, HTN, DM, CKD Stage 3, CVA- lacunar infarcts admitted to ICU originally for cardiac arrest. ACLS for PEA arrest and ROSC returned after 5 minutes. Cardiac arrest possibly secondary to severe hypoglycemia from eating less and not tracking blood sugar carefully. Hospital course complicated by 1) prolonged hypoxemic respiratory failure. sp trach and peg 2) left upper extremity DVT and placed on eliquis 3) multiple infections (enteroccoal facelis cellutis, infected left hand hematoma, tracheobronchitis secondary to citrobacr 4) tonic clonic seizure - on keppra/depakoate- currently undergoing reevaluation by NEUROLOGY 5) fever of 102 on 1/19. INFECTIOUS DISEASE reconsulted and pan culture ordered

## 2023-01-21 NOTE — PROGRESS NOTE ADULT - SUBJECTIVE AND OBJECTIVE BOX
Patient is a 72y old  Male who presents with a chief complaint of s/p cardiac arrest, hypoglycemia (19 Jan 2023 08:12)    INTERVAL HPI/OVERNIGHT EVENTS:   none  SUBJECTIVE: no complaints of pain/discomfort/dyspnea    MEDICATIONS  (STANDING):  albuterol/ipratropium for Nebulization 3 milliLiter(s) Nebulizer every 6 hours  apixaban 5 milliGRAM(s) Oral every 12 hours  aspirin  chewable 81 milliGRAM(s) Oral daily  atorvastatin 20 milliGRAM(s) Oral at bedtime  bacitracin   Ointment 1 Application(s) Topical two times a day  bacitracin   Ointment 1 Application(s) Topical two times a day  budesonide 160 MICROgram(s)/formoterol 4.5 MICROgram(s) Inhaler 2 Puff(s) Inhalation two times a day  chlorhexidine 0.12% Liquid 15 milliLiter(s) Oral Mucosa every 12 hours  chlorhexidine 2% Cloths 1 Application(s) Topical <User Schedule>  collagenase Ointment 1 Application(s) Topical two times a day  cyanocobalamin 1000 MICROGram(s) Oral daily  diphenhydrAMINE Injectable 50 milliGRAM(s) IV Push once  folic acid 1 milliGRAM(s) Oral daily  glycopyrrolate 1 milliGRAM(s) Oral two times a day  insulin glargine Injectable (LANTUS) 20 Unit(s) SubCutaneous every morning  insulin lispro (ADMELOG) corrective regimen sliding scale   SubCutaneous every 6 hours  levETIRAcetam 1500 milliGRAM(s) Oral two times a day  metoprolol tartrate 25 milliGRAM(s) Oral two times a day  polyethylene glycol 3350 17 Gram(s) Oral daily  saline laxative (FLEET) Rectal Enema 1 Enema Rectal once  scopolamine 1 mG/72 Hr(s) Patch 1 Patch Transdermal every 72 hours  senna 2 Tablet(s) Oral at bedtime  silver sulfADIAZINE 1% Cream 1 Application(s) Topical two times a day  valproic  acid Syrup 750 milliGRAM(s) Oral <User Schedule>    MEDICATIONS  (PRN):  acetaminophen     Tablet .. 650 milliGRAM(s) Oral every 6 hours PRN Temp greater or equal to 38C (100.4F), Mild Pain (1 - 3)  albuterol    90 MICROgram(s) HFA Inhaler 2 Puff(s) Inhalation every 6 hours PRN Shortness of Breath and/or Wheezing  aluminum hydroxide/magnesium hydroxide/simethicone Suspension 30 milliLiter(s) Oral every 4 hours PRN Dyspepsia  oxycodone    5 mG/acetaminophen 325 mG 1 Tablet(s) Oral every 4 hours PRN Moderate Pain (4 - 6)    Allergies    No Known Allergies    Intolerances    Vital Signs Last 24 Hrs  T(C): 36.2 (21 Jan 2023 05:28), Max: 36.6 (21 Jan 2023 01:29)  T(F): 97.2 (21 Jan 2023 05:28), Max: 97.8 (21 Jan 2023 01:29)  HR: 55 (21 Jan 2023 09:09) (55 - 90)  BP: 125/79 (21 Jan 2023 05:28) (104/68 - 125/79)  BP(mean): --  RR: 18 (21 Jan 2023 05:28) (16 - 18)  SpO2: 100% (21 Jan 2023 09:09) (98% - 100%)    Parameters below as of 21 Jan 2023 05:28  Patient On (Oxygen Delivery Method): ventilator        PHYSICAL EXAM:  GENERAL: NAD, well-groomed, well-developed  HEAD:  Atraumatic, Normocephalic  EYES: EOMI, PERRLA, conjunctiva and sclera clear  ENMT: No tonsillar erythema, exudates, or enlargement; Moist mucous membranes, Good dentition, No lesions  NECK: Supple,   NERVOUS SYSTEM: sleeping    CHEST/LUNG: Clear to percussion bilaterally; No rales, rhonchi, wheezing, or rubs  HEART: Regular rate and rhythm; No murmurs, rubs, or gallops  ABDOMEN: Soft, Nontender, Nondistended; Bowel sounds present + peg  EXTREMITIES:  2+ Peripheral Pulses, No clubbing, cyanosis, or edema  SKIN: stage 3 sacral , skin tear to face where EEG leads        Labs                                 9.2    7.23  )-----------( 267      ( 21 Jan 2023 07:20 )             29.4   01-21    143  |  107  |  28<H>  ----------------------------<  135<H>  4.0   |  31  |  0.86    Ca    8.8      21 Jan 2023 07:20  Phos  3.0     01-21  Mg     2.3     01-21    TPro  6.3  /  Alb  2.2<L>  /  TBili  0.2  /  DBili  x   /  AST  9<L>  /  ALT  17  /  AlkPhos  82  01-20      CAPILLARY BLOOD GLUCOSE      POCT Blood Glucose.: 154 mg/dL (20 Jan 2023 07:46)  POCT Blood Glucose.: 162 mg/dL (20 Jan 2023 05:06)  POCT Blood Glucose.: 183 mg/dL (20 Jan 2023 00:27)  POCT Blood Glucose.: 126 mg/dL (19 Jan 2023 21:00)  POCT Blood Glucose.: 107 mg/dL (19 Jan 2023 16:45)  POCT Blood Glucose.: 168 mg/dL (19 Jan 2023 11:27)   Patient is a 72y old  Male who presents with a chief complaint of s/p cardiac arrest, hypoglycemia (19 Jan 2023 08:12)    INTERVAL HPI/OVERNIGHT EVENTS:   none  SUBJECTIVE: no complaints of pain/discomfort/dyspnea    MEDICATIONS  (STANDING):  albuterol/ipratropium for Nebulization 3 milliLiter(s) Nebulizer every 6 hours  apixaban 5 milliGRAM(s) Oral every 12 hours  aspirin  chewable 81 milliGRAM(s) Oral daily  atorvastatin 20 milliGRAM(s) Oral at bedtime  bacitracin   Ointment 1 Application(s) Topical two times a day  bacitracin   Ointment 1 Application(s) Topical two times a day  budesonide 160 MICROgram(s)/formoterol 4.5 MICROgram(s) Inhaler 2 Puff(s) Inhalation two times a day  chlorhexidine 0.12% Liquid 15 milliLiter(s) Oral Mucosa every 12 hours  chlorhexidine 2% Cloths 1 Application(s) Topical <User Schedule>  collagenase Ointment 1 Application(s) Topical two times a day  cyanocobalamin 1000 MICROGram(s) Oral daily  diphenhydrAMINE Injectable 50 milliGRAM(s) IV Push once  folic acid 1 milliGRAM(s) Oral daily  glycopyrrolate 1 milliGRAM(s) Oral two times a day  insulin glargine Injectable (LANTUS) 20 Unit(s) SubCutaneous every morning  insulin lispro (ADMELOG) corrective regimen sliding scale   SubCutaneous every 6 hours  levETIRAcetam 1500 milliGRAM(s) Oral two times a day  metoprolol tartrate 25 milliGRAM(s) Oral two times a day  polyethylene glycol 3350 17 Gram(s) Oral daily  saline laxative (FLEET) Rectal Enema 1 Enema Rectal once  scopolamine 1 mG/72 Hr(s) Patch 1 Patch Transdermal every 72 hours  senna 2 Tablet(s) Oral at bedtime  silver sulfADIAZINE 1% Cream 1 Application(s) Topical two times a day  valproic  acid Syrup 750 milliGRAM(s) Oral <User Schedule>    MEDICATIONS  (PRN):  acetaminophen     Tablet .. 650 milliGRAM(s) Oral every 6 hours PRN Temp greater or equal to 38C (100.4F), Mild Pain (1 - 3)  albuterol    90 MICROgram(s) HFA Inhaler 2 Puff(s) Inhalation every 6 hours PRN Shortness of Breath and/or Wheezing  aluminum hydroxide/magnesium hydroxide/simethicone Suspension 30 milliLiter(s) Oral every 4 hours PRN Dyspepsia  oxycodone    5 mG/acetaminophen 325 mG 1 Tablet(s) Oral every 4 hours PRN Moderate Pain (4 - 6)    Allergies    No Known Allergies    Intolerances    Vital Signs Last 24 Hrs  T(C): 36.2 (21 Jan 2023 05:28), Max: 36.6 (21 Jan 2023 01:29)  T(F): 97.2 (21 Jan 2023 05:28), Max: 97.8 (21 Jan 2023 01:29)  HR: 55 (21 Jan 2023 09:09) (55 - 90)  BP: 125/79 (21 Jan 2023 05:28) (104/68 - 125/79)  BP(mean): --  RR: 18 (21 Jan 2023 05:28) (16 - 18)  SpO2: 100% (21 Jan 2023 09:09) (98% - 100%)    Parameters below as of 21 Jan 2023 05:28  Patient On (Oxygen Delivery Method): ventilator        PHYSICAL EXAM:  GENERAL: NAD, well-groomed, well-developed  HEAD:  Atraumatic, Normocephalic  EYES: EOMI, PERRLA, conjunctiva and sclera clear  ENMT: No tonsillar erythema, exudates, or enlargement; Moist mucous membranes, Good dentition, No lesions  NECK: Supple,   NERVOUS SYSTEM: sleeping    CHEST/LUNG: Coarse bs diffusely   HEART: Regular rate and rhythm; No murmurs, rubs, or gallops  ABDOMEN: Soft, Nontender, Nondistended; Bowel sounds present + peg  EXTREMITIES:  2+ Peripheral Pulses, No clubbing, cyanosis, or edema  SKIN: stage 4 sacral , skin tear to face where EEG leads        Labs                                 9.2    7.23  )-----------( 267      ( 21 Jan 2023 07:20 )             29.4   01-21    143  |  107  |  28<H>  ----------------------------<  135<H>  4.0   |  31  |  0.86    Ca    8.8      21 Jan 2023 07:20  Phos  3.0     01-21  Mg     2.3     01-21    TPro  6.3  /  Alb  2.2<L>  /  TBili  0.2  /  DBili  x   /  AST  9<L>  /  ALT  17  /  AlkPhos  82  01-20      CAPILLARY BLOOD GLUCOSE      POCT Blood Glucose.: 154 mg/dL (20 Jan 2023 07:46)  POCT Blood Glucose.: 162 mg/dL (20 Jan 2023 05:06)  POCT Blood Glucose.: 183 mg/dL (20 Jan 2023 00:27)  POCT Blood Glucose.: 126 mg/dL (19 Jan 2023 21:00)  POCT Blood Glucose.: 107 mg/dL (19 Jan 2023 16:45)  POCT Blood Glucose.: 168 mg/dL (19 Jan 2023 11:27)

## 2023-01-21 NOTE — PROGRESS NOTE ADULT - ASSESSMENT
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 ACLS for PEA arrest and ROSC returned after 5 minutes. Cardiac arrest possibly secondary to severe hypoglycemia from eating less and not tracking blood sugar carefully. Hospital course complicated by 1) prolonged hypoxemic respiratory failure. sp trach and peg 2) left upper extremity DVT and placed on eliquis 3) multiple infections (enteroccoal facelis cellutis, infected left hand hematoma, tracheobronchitis secondary to citrobacr 4) tonic clonic seizure - on keppra/depakoate- currently undergoing reevaluation by NEUROLOGY 5) fever of 102 on 1/19. INFECTIOUS DISEASE reconsulted and pan culture ordered     CARDIOLOGY  # Cad s/p PCI w/ stent  # CABG  # HF w/ PeF  # 2nd degree heart block  # sp PPM placement   # cardiac arrest- resolved  - ECHO Takotsubo cardiomyopathy. EF 50-55%   - lopressor 25 mg po bid, aspirin 81 mg po daily, lipitor 40 mg po qhs    PULMNOLOGY  # COPD  # prolonged hypoxemic respiratory failure  - s/p intubation for cardiac arrest  - failed extubation now on trach to vent  - hospital course complicated by blood secretions from trach. bleeding has self resolved     INFECTIOUS DISEASE  # trachobronchitis secondary to citrobacter?? - resolved  # enterococcal faecalis cellulitis - resolved   # infected left hand hematoma - s/p bedside debridement 12/13   # fever 102 on 1/19  -12/13 Hand surgery consult appreciated, s/p bedside debridement.   -   1/20/2023  rocephin day # 4. still had fever. INFECTIOUS DISEASE reconsulted by elaine  - blood culture 1/19- x 2 pending  - urine culture 1/19- pending  stage 3 decubuti--will get wound care re consult  will get specialty bed    HEME/ONC  # LUE VTE wi  -LUE venous duplex : occlusive thrombus in the left mid subclavian vein with partial slow flow detected in the lateral subclavian vein.  -LUE arterial Duplex neg   --change lovenox to eliquis 5 mg bid.     NEUROLOGY  # anoxic brain injury  # tonic clonic seizure  - had new flailing of the legs today which are concerning fro myoclonus vs seizure   - on keppra, depakaote   " Dr. Loo. aware and ordered VEEG which now shows myoclonic seizure. will reach out to Dr. Loo as he is still seizure "    GASTROENTEROLOGY  # Shock liver.- resolved  due to PEA arrest    ENDOCRINOLOGY  - DM2   - A1c 8.8%  - on lantus/RISS    DERMATOLOGY  - Ustageable sacral pressure wound   - stage 3 sacral decub ulcer   - s/p debridement 12/19  - wound care    PLAN  - c/w current level of care  - panculture  - follow up with INFECTIOUS DISEASE   - follow up with NEUROLOGY over EEG and readjustment of seizure medication--> reccs are to increase to 750 bid level at 49    Preventative measures   dvt ppx>>>on DOAC  fall, aspiration  precautions    Dipso: Patient has no insurance and Family in process of getting guardianship. Patient will then need placement.        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 ACLS for PEA arrest and ROSC returned after 5 minutes. Cardiac arrest possibly secondary to severe hypoglycemia from eating less and not tracking blood sugar carefully. Hospital course complicated by 1) prolonged hypoxemic respiratory failure. sp trach and peg 2) left upper extremity DVT and placed on eliquis 3) multiple infections (enteroccoal facelis cellutis, infected left hand hematoma, tracheobronchitis secondary to citrobacr 4) tonic clonic seizure - on keppra/depakoate- currently undergoing reevaluation by NEUROLOGY 5) fever of 102 on 1/19. INFECTIOUS DISEASE reconsulted and pan culture ordered     CARDIOLOGY  # Cad s/p PCI w/ stent  # CABG  # HF w/ PeF  # 2nd degree heart block  # sp PPM placement   # cardiac arrest- resolved  - ECHO Takotsubo cardiomyopathy. EF 50-55%   - lopressor 25 mg po bid, aspirin 81 mg po daily, lipitor 40 mg po qhs    PULMNOLOGY  # COPD  # prolonged hypoxemic respiratory failure  - s/p intubation for cardiac arrest  - failed extubation now on trach to vent  - hospital course complicated by blood secretions from trach. bleeding has self resolved     INFECTIOUS DISEASE  # trachobronchitis secondary to citrobacter?? - resolved  # enterococcal faecalis cellulitis - resolved   # infected left hand hematoma - s/p bedside debridement 12/13   # fever 102 on 1/19  -12/13 Hand surgery consult appreciated, s/p bedside debridement.   -   1/20/2023  rocephin day # 4. still had fever. INFECTIOUS DISEASE reconsulted by elaine  - blood culture 1/19- x 2 pending  - urine culture 1/19- pending  stage 3 decubuti--will get wound care re consult  will get specialty bed    HEME/ONC  # LUE VTE wi  -LUE venous duplex : occlusive thrombus in the left mid subclavian vein with partial slow flow detected in the lateral subclavian vein.  -LUE arterial Duplex neg   --change lovenox to eliquis 5 mg bid.     NEUROLOGY  # anoxic brain injury  # tonic clonic seizure  - had new flailing of the legs today which are concerning fro myoclonus vs seizure   - on keppra, depakaote   " Dr. Loo. aware and ordered VEEG which now shows myoclonic seizure. will reach out to Dr. Loo as he is still seizure "    GASTROENTEROLOGY  # Shock liver.- resolved  due to PEA arrest    ENDOCRINOLOGY  - DM2   - A1c 8.8%  - on lantus/RISS    DERMATOLOGY  - Ustageable sacral pressure wound   - stage 3 sacral decub ulcer   - s/p debridement 12/19  - wound care    PLAN  - c/w current level of care  - panculture  - follow up with INFECTIOUS DISEASE   - follow up with NEUROLOGY over EEG and readjustment of seizure medication--> reccs are to increase to 750 bid level at 49   stage 4 decubiti sacrum wound pt reccs have been ordered   updated son at bedside     Turn and position the  patient  frequently per  protocol to prevent decubiti ulcer,  skin breakdown and/or further skin breakdown.  Preventative measures   dvt ppx>>>on DOAC  fall, aspiration  precautions    Dipso: Patient has no insurance and Family in process of getting guardianship. Patient will then need placement.            71 yo male w/ pmhx of COPD, CAD sp PCI w/ stent, CABG 2014, HF w/ PeF, 2nd degree heart block, sp PPM, HTN, DM, CKD Stage 3, CVA- lacunar infarcts admitted to ICU originally for cardiac arrest. ACLS for PEA arrest and ROSC returned after 5 minutes. Cardiac arrest possibly secondary to severe hypoglycemia from eating less and not tracking blood sugar carefully. Hospital course complicated by 1) prolonged hypoxemic respiratory failure. sp trach and peg 2) left upper extremity DVT and placed on eliquis 3) multiple infections (enteroccoal facelis cellutis, infected left hand hematoma, tracheobronchitis secondary to citrobacr 4) tonic clonic seizure - on keppra/depakoate- currently undergoing reevaluation by NEUROLOGY 5) fever of 102 on 1/19. INFECTIOUS DISEASE reconsulted and pan culture ordered     CARDIOLOGY  # Cad s/p PCI w/ stent  # CABG  # HF w/ PeF  # 2nd degree heart block  # sp PPM placement   # cardiac arrest- resolved  - ECHO Takotsubo cardiomyopathy. EF 50-55%   - lopressor 25 mg po bid, aspirin 81 mg po daily, lipitor 40 mg po qhs    PULMNOLOGY  # COPD  # prolonged hypoxemic respiratory failure  - s/p intubation for cardiac arrest  - failed extubation now on trach to vent  - hospital course complicated by blood secretions from trach. bleeding has self resolved     INFECTIOUS DISEASE  # trachobronchitis secondary to citrobacter?? - resolved  # enterococcal faecalis cellulitis - resolved   # infected left hand hematoma - s/p bedside debridement 12/13   # fever 102 on 1/19  -12/13 Hand surgery consult appreciated, s/p bedside debridement.   -   1/20/2023  rocephin day # 4. still had fever. INFECTIOUS DISEASE reconsulted by collegaue  - blood culture 1/19- x 2 pending  - urine culture 1/19- pending  stage 3 decubuti--will get wound care re consult  will get specialty bed  1/21/2023 reccs  by wound pt were ordered      HEME/ONC  # LUE VTE wi  -LUE venous duplex : occlusive thrombus in the left mid subclavian vein with partial slow flow detected in the lateral subclavian vein.  -LUE arterial Duplex neg   --change lovenox to eliquis 5 mg bid.     NEUROLOGY  # anoxic brain injury  # tonic clonic seizure  - had new flailing of the legs today which are concerning fro myoclonus vs seizure   - on kelivia depakaote   " Dr. Loo. aware and ordered VEEG which now shows myoclonic seizure. will reach out to Dr. Loo as he is still seizure "    GASTROENTEROLOGY  # Shock liver.- resolved  due to PEA arrest    ENDOCRINOLOGY  - DM2   - A1c 8.8%  - on lantus/RISS    DERMATOLOGY  - Ustageable sacral pressure wound   - stage 3 sacral decub ulcer   - s/p debridement 12/19  - wound care    PLAN  - c/w current level of care  - panculture  - follow up with INFECTIOUS DISEASE   - follow up with NEUROLOGY over EEG and readjustment of seizure medication--> reccs are to increase to 750 bid level at 49   stage 4 decubiti sacrum wound pt reccs have been ordered   updated son at bedside     Turn and position the  patient  frequently per  protocol to prevent decubiti ulcer,  skin breakdown and/or further skin breakdown.  Preventative measures   dvt ppx>>>on DOAC  fall, aspiration  precautions    Dipso: Patient has no insurance and Family in process of getting guardianship. Patient will then need placement.            71 yo male w/ pmhx of COPD, CAD sp PCI w/ stent, CABG 2014, HF w/ PeF, 2nd degree heart block, sp PPM, HTN, DM, CKD Stage 3, CVA- lacunar infarcts admitted to ICU originally for cardiac arrest. ACLS for PEA arrest and ROSC returned after 5 minutes. Cardiac arrest possibly secondary to severe hypoglycemia from eating less and not tracking blood sugar carefully. Hospital course complicated by 1) prolonged hypoxemic respiratory failure. sp trach and peg 2) left upper extremity DVT and placed on eliquis 3) multiple infections (enteroccoal facelis cellutis, infected left hand hematoma, tracheobronchitis secondary to citrobacr 4) tonic clonic seizure - on keppra/depakoate- currently undergoing reevaluation by NEUROLOGY 5) fever of 102 on 1/19. INFECTIOUS DISEASE reconsulted and pan culture ordered     CARDIOLOGY  # Cad s/p PCI w/ stent  # CABG  # HF w/ PeF  # 2nd degree heart block  # sp PPM placement   # cardiac arrest- resolved  - ECHO Takotsubo cardiomyopathy. EF 50-55%   - lopressor 25 mg po bid, aspirin 81 mg po daily, lipitor 40 mg po qhs    PULMNOLOGY  # COPD  # prolonged hypoxemic respiratory failure  - s/p intubation for cardiac arrest  - failed extubation now on trach to vent  - hospital course complicated by blood secretions from trach. bleeding has self resolved     INFECTIOUS DISEASE  # trachobronchitis secondary to citrobacter?? - resolved  # enterococcal faecalis cellulitis - resolved   # infected left hand hematoma - s/p bedside debridement 12/13   # fever 102 on 1/19  -12/13 Hand surgery consult appreciated, s/p bedside debridement.   -   1/20/2023  rocephin day # 4. still had fever. INFECTIOUS DISEASE reconsulted by collegaue  - blood culture 1/19- x 2 pending  - urine culture 1/19- pending  stage 3 decubuti--will get wound care re consult  will get specialty bed  1/21/2023 reccs  by wound pt were ordered      HEME/ONC  # LUE VTE wi  -LUE venous duplex : occlusive thrombus in the left mid subclavian vein with partial slow flow detected in the lateral subclavian vein.  -LUE arterial Duplex neg   --change lovenox to eliquis 5 mg bid.     NEUROLOGY  # anoxic brain injury  # tonic clonic seizure  - had new flailing of the legs today which are concerning fro myoclonus vs seizure   - on keppmichael depakaote   " Dr. Loo. aware and ordered VEEG which now shows myoclonic seizure. will reach out to Dr. Loo as he is still seizure "    GASTROENTEROLOGY  # Shock liver.- resolved  due to PEA arrest    ENDOCRINOLOGY  - DM2   - A1c 8.8%  - on lantus/RISS    DERMATOLOGY  - Ustageable sacral pressure wound   - stage 3 sacral decub ulcer   - s/p debridement 12/19  - wound care  1/21/2023 stage 4 sacral  decubuti recommendations orderde    PLAN  - c/w current level of care  - panculture  - follow up with INFECTIOUS DISEASE   - follow up with NEUROLOGY over EEG and readjustment of seizure medication--> reccs are to increase to 750 bid level at 49   stage 4 decubiti sacrum wound pt reccs have been ordered   updated son at bedside     Turn and position the  patient  frequently per  protocol to prevent decubiti ulcer,  skin breakdown and/or further skin breakdown.  Preventative measures   dvt ppx>>>on DOAC  fall, aspiration  precautions    Dipso: Patient has no insurance and Family in process of getting guardianship. Patient will then need placement.            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 ACLS for PEA arrest and ROSC returned after 5 minutes. Cardiac arrest possibly secondary to severe hypoglycemia from eating less and not tracking blood sugar carefully. Hospital course complicated by 1) prolonged hypoxemic respiratory failure. sp trach and peg 2) left upper extremity DVT and placed on eliquis 3) multiple infections (enteroccoal facelis cellutis, infected left hand hematoma, tracheobronchitis secondary to citrobacr 4) tonic clonic seizure - on keppra/depakoate- currently undergoing reevaluation by NEUROLOGY 5) fever of 102 on 1/19. INFECTIOUS DISEASE reconsulted and pan culture ordered     CARDIOLOGY  # Cad s/p PCI w/ stent  # CABG  # HF w/ PeF  # 2nd degree heart block  # sp PPM placement   # cardiac arrest- resolved  - ECHO Takotsubo cardiomyopathy. EF 50-55%   - lopressor 25 mg po bid, aspirin 81 mg po daily, lipitor 40 mg po qhs    PULMNOLOGY  # COPD  # prolonged hypoxemic respiratory failure  - s/p intubation for cardiac arrest  - failed extubation now on trach to vent  - hospital course complicated by blood secretions from trach. bleeding has self resolved     INFECTIOUS DISEASE  # trachobronchitis secondary to citrobacter?? - resolved  # enterococcal faecalis cellulitis - resolved   # infected left hand hematoma - s/p bedside debridement 12/13   # fever 102 on 1/19  -12/13 Hand surgery consult appreciated, s/p bedside debridement.   -   1/20/2023  rocephin day # 4. still had fever. INFECTIOUS DISEASE reconsulted by collegaue  - blood culture 1/19- x 2 pending  - urine culture 1/19- pending  stage 3 decubuti--will get wound care re consult  will get specialty bed  1/21/2023 reccs  by wound pt were ordered      HEME/ONC  # LUE VTE wi  -LUE venous duplex : occlusive thrombus in the left mid subclavian vein with partial slow flow detected in the lateral subclavian vein.  -LUE arterial Duplex neg   --change lovenox to eliquis 5 mg bid.     NEUROLOGY  # anoxic brain injury  # tonic clonic seizure  - had new flailing of the legs today which are concerning fro myoclonus vs seizure   - on keppmichael depakaote   " Dr. Loo. aware and ordered VEEG which now shows myoclonic seizure. will reach out to Dr. Loo as he is still seizure "    GASTROENTEROLOGY  # Shock liver.- resolved  due to PEA arrest    ENDOCRINOLOGY  - DM2   - A1c 8.8%  - on lantus/RISS    DERMATOLOGY  - Ustageable sacral pressure wound   - stage 3 sacral decub ulcer   - s/p debridement 12/19  - wound care  1/21/2023 stage 4 sacral  decubuti recommendations orderde    PLAN  - c/w current level of care  - panculture  - follow up with INFECTIOUS DISEASE   - follow up with NEUROLOGY over EEG and readjustment of seizure medication--> reccs are to increase to 750 bid level at 49   stage 4 decubiti sacrum wound pt reccs have been ordered   functional quad-- supportive care    Turn and position the  patient  frequently per  protocol to prevent decubiti ulcer,  skin breakdown and/or further skin breakdown.  Preventative measures       dvt ppx>>>on DOAC  fall, aspiration  precautions    Dipso: Patient has no insurance and Family in process of getting guardianship. Patient will then need placement.    updated son at bedside

## 2023-01-21 NOTE — PROGRESS NOTE ADULT - SUBJECTIVE AND OBJECTIVE BOX
BRANDON CAMARILLO    [facility] [address]    Allergies    No Known Allergies    Intolerances        PAST MEDICAL & SURGICAL HISTORY:  HTN (hypertension)      HLD (hyperlipidemia)      Diabetes mellitus      Lacunar infarction      BPH (benign prostatic hyperplasia)      CHF (congestive heart failure)      Chronic obstructive pulmonary disease, unspecified COPD type      S/P CABG (coronary artery bypass graft)            FAMILY HISTORY:      Home Medications:  aspirin 81 mg oral delayed release tablet: 1 tab(s) orally once a day (2020 17:35)  Liquifilm Tears preserved ophthalmic solution: 1 drop(s) to each affected eye 2 times a day (2020 17:35)      MEDICATIONS  (STANDING):  albuterol/ipratropium for Nebulization 3 milliLiter(s) Nebulizer every 6 hours  apixaban 5 milliGRAM(s) Oral every 12 hours  aspirin  chewable 81 milliGRAM(s) Oral daily  atorvastatin 20 milliGRAM(s) Oral at bedtime  bacitracin   Ointment 1 Application(s) Topical two times a day  bacitracin   Ointment 1 Application(s) Topical two times a day  budesonide 160 MICROgram(s)/formoterol 4.5 MICROgram(s) Inhaler 2 Puff(s) Inhalation two times a day  chlorhexidine 0.12% Liquid 15 milliLiter(s) Oral Mucosa every 12 hours  chlorhexidine 2% Cloths 1 Application(s) Topical <User Schedule>  collagenase Ointment 1 Application(s) Topical two times a day  cyanocobalamin 1000 MICROGram(s) Oral daily  diphenhydrAMINE Injectable 50 milliGRAM(s) IV Push once  folic acid 1 milliGRAM(s) Oral daily  glycopyrrolate 1 milliGRAM(s) Oral two times a day  insulin glargine Injectable (LANTUS) 20 Unit(s) SubCutaneous every morning  insulin lispro (ADMELOG) corrective regimen sliding scale   SubCutaneous every 6 hours  levETIRAcetam 1500 milliGRAM(s) Oral two times a day  metoprolol tartrate 25 milliGRAM(s) Oral two times a day  polyethylene glycol 3350 17 Gram(s) Oral daily  saline laxative (FLEET) Rectal Enema 1 Enema Rectal once  scopolamine 1 mG/72 Hr(s) Patch 1 Patch Transdermal every 72 hours  senna 2 Tablet(s) Oral at bedtime  silver sulfADIAZINE 1% Cream 1 Application(s) Topical two times a day  valproic  acid Syrup 750 milliGRAM(s) Oral <User Schedule>    MEDICATIONS  (PRN):  acetaminophen     Tablet .. 650 milliGRAM(s) Oral every 6 hours PRN Temp greater or equal to 38C (100.4F), Mild Pain (1 - 3)  albuterol    90 MICROgram(s) HFA Inhaler 2 Puff(s) Inhalation every 6 hours PRN Shortness of Breath and/or Wheezing  aluminum hydroxide/magnesium hydroxide/simethicone Suspension 30 milliLiter(s) Oral every 4 hours PRN Dyspepsia  oxycodone    5 mG/acetaminophen 325 mG 1 Tablet(s) Oral every 4 hours PRN Moderate Pain (4 - 6)      Diet, NPO with Tube Feed:   Tube Feeding Modality: Gastrostomy  Glucerna 1.2 Pedrito  Total Volume for 24 Hours (mL): 1440  Continuous  Until Goal Tube Feed Rate (mL per Hour): 60  Tube Feed Duration (in Hours): 24  Tube Feed Start Time: 14:30  Free Water Flush  Free Water Flush Instructions:  30 ml/hr via pump  Liquid Protein Supplement     Qty per Day:  1 (23 @ 13:45) [Active]          Vital Signs Last 24 Hrs  T(C): 36.2 (2023 05:28), Max: 36.6 (2023 01:29)  T(F): 97.2 (2023 05:28), Max: 97.8 (2023 01:29)  HR: 55 (2023 09:09) (55 - 90)  BP: 125/79 (2023 05:28) (104/68 - 125/79)  BP(mean): --  RR: 18 (2023 05:28) (16 - 18)  SpO2: 100% (2023 09:09) (98% - 100%)    Parameters below as of 2023 05:28  Patient On (Oxygen Delivery Method): ventilator          23 @ 07:01  -  - @ 07:00  --------------------------------------------------------  IN: 0 mL / OUT: 1 mL / NET: -1 mL        Mode: AC/ CMV (Assist Control/ Continuous Mandatory Ventilation), RR (machine): 14, TV (machine): 450, FiO2: 35, PEEP: 5, ITime: 0.8, MAP: 9, PIP: 20      LABS:                        9.2    7.23  )-----------( 267      ( 2023 07:20 )             29.4         143  |  107  |  28<H>  ----------------------------<  135<H>  4.0   |  31  |  0.86    Ca    8.8      2023 07:20  Phos  3.0       Mg     2.3         TPro  6.3  /  Alb  2.2<L>  /  TBili  0.2  /  DBili  x   /  AST  9<L>  /  ALT  17  /  AlkPhos  82        Urinalysis Basic - ( 2023 08:10 )    Color: Yellow / Appearance: Clear / S.010 / pH: x  Gluc: x / Ketone: Trace  / Bili: Negative / Urobili: Negative mg/dL   Blood: x / Protein: 30 mg/dL / Nitrite: Negative   Leuk Esterase: Trace / RBC: 0-2 /HPF / WBC 0-2   Sq Epi: x / Non Sq Epi: Occasional / Bacteria: Few            WBC:  WBC Count: 7.23 K/uL ( @ 07:20)  WBC Count: 6.86 K/uL ( @ 07:50)  WBC Count: 7.99 K/uL ( @ 07:42)  WBC Count: 7.98 K/uL ( @ 07:28)      MICROBIOLOGY:  RECENT CULTURES:   Trach Asp Tracheal Aspirate XXXX   Few polymorphonuclear leukocytes per low power field  Numerous Squamous epithelial cells per low power field  Few Gram Negative Rods seen per oil power field  Few Gram Positive Cocci seen per oil power field  Rare Gram Positive Rods seen per oil power field XXXX     .Blood Blood-Peripheral XXXX XXXX   No growth to date.     .Blood Blood-Peripheral XXXX XXXX   No growth to date.                    Sodium:  Sodium, Serum: 143 mmol/L ( @ 07:20)  Sodium, Serum: 144 mmol/L ( @ 07:50)  Sodium, Serum: 145 mmol/L ( @ 07:42)      0.86 mg/dL  07:20  0.96 mg/dL  @ 07:50  0.99 mg/dL  07:42      Hemoglobin:  Hemoglobin: 9.2 g/dL ( @ 07:20)  Hemoglobin: 9.8 g/dL ( @ 07:50)  Hemoglobin: 9.1 g/dL ( 07:42)  Hemoglobin: 10.4 g/dL ( @ 07:28)      Platelets: Platelet Count - Automated: 267 K/uL ( @ 07:20)  Platelet Count - Automated: 270 K/uL ( @ 07:50)  Platelet Count - Automated: 260 K/uL ( @ 07:42)  Platelet Count - Automated: 231 K/uL ( @ 07:28)      LIVER FUNCTIONS - ( 2023 07:50 )  Alb: 2.2 g/dL / Pro: 6.3 gm/dL / ALK PHOS: 82 U/L / ALT: 17 U/L / AST: 9 U/L / GGT: x             Urinalysis Basic - ( 2023 08:10 )    Color: Yellow / Appearance: Clear / S.010 / pH: x  Gluc: x / Ketone: Trace  / Bili: Negative / Urobili: Negative mg/dL   Blood: x / Protein: 30 mg/dL / Nitrite: Negative   Leuk Esterase: Trace / RBC: 0-2 /HPF / WBC 0-2   Sq Epi: x / Non Sq Epi: Occasional / Bacteria: Few        RADIOLOGY & ADDITIONAL STUDIES:      MICROBIOLOGY:  RECENT CULTURES:   Trach Asp Tracheal Aspirate XXXX   Few polymorphonuclear leukocytes per low power field  Numerous Squamous epithelial cells per low power field  Few Gram Negative Rods seen per oil power field  Few Gram Positive Cocci seen per oil power field  Rare Gram Positive Rods seen per oil power field XXXX    - .Blood Blood-Peripheral XXXX XXXX   No growth to date.     .Blood Blood-Peripheral XXXX XXXX   No growth to date.

## 2023-01-22 LAB
-  AMIKACIN: SIGNIFICANT CHANGE UP
-  AMOXICILLIN/CLAVULANIC ACID: SIGNIFICANT CHANGE UP
-  AMPICILLIN/SULBACTAM: SIGNIFICANT CHANGE UP
-  AMPICILLIN: SIGNIFICANT CHANGE UP
-  AZTREONAM: SIGNIFICANT CHANGE UP
-  CEFAZOLIN: SIGNIFICANT CHANGE UP
-  CEFEPIME: SIGNIFICANT CHANGE UP
-  CEFOXITIN: SIGNIFICANT CHANGE UP
-  CEFTRIAXONE: SIGNIFICANT CHANGE UP
-  CIPROFLOXACIN: SIGNIFICANT CHANGE UP
-  ERTAPENEM: SIGNIFICANT CHANGE UP
-  GENTAMICIN: SIGNIFICANT CHANGE UP
-  IMIPENEM: SIGNIFICANT CHANGE UP
-  LEVOFLOXACIN: SIGNIFICANT CHANGE UP
-  MEROPENEM: SIGNIFICANT CHANGE UP
-  PIPERACILLIN/TAZOBACTAM: SIGNIFICANT CHANGE UP
-  TOBRAMYCIN: SIGNIFICANT CHANGE UP
-  TRIMETHOPRIM/SULFAMETHOXAZOLE: SIGNIFICANT CHANGE UP
A1C WITH ESTIMATED AVERAGE GLUCOSE RESULT: 5.9 % — HIGH (ref 4–5.6)
ANION GAP SERPL CALC-SCNC: 6 MMOL/L — SIGNIFICANT CHANGE UP (ref 5–17)
BUN SERPL-MCNC: 28 MG/DL — HIGH (ref 7–23)
CALCIUM SERPL-MCNC: 9 MG/DL — SIGNIFICANT CHANGE UP (ref 8.5–10.1)
CHLORIDE SERPL-SCNC: 105 MMOL/L — SIGNIFICANT CHANGE UP (ref 96–108)
CO2 SERPL-SCNC: 30 MMOL/L — SIGNIFICANT CHANGE UP (ref 22–31)
CREAT SERPL-MCNC: 0.84 MG/DL — SIGNIFICANT CHANGE UP (ref 0.5–1.3)
CULTURE RESULTS: SIGNIFICANT CHANGE UP
EGFR: 92 ML/MIN/1.73M2 — SIGNIFICANT CHANGE UP
ESTIMATED AVERAGE GLUCOSE: 123 MG/DL — HIGH (ref 68–114)
GLUCOSE BLDC GLUCOMTR-MCNC: 121 MG/DL — HIGH (ref 70–99)
GLUCOSE BLDC GLUCOMTR-MCNC: 126 MG/DL — HIGH (ref 70–99)
GLUCOSE BLDC GLUCOMTR-MCNC: 127 MG/DL — HIGH (ref 70–99)
GLUCOSE BLDC GLUCOMTR-MCNC: 134 MG/DL — HIGH (ref 70–99)
GLUCOSE BLDC GLUCOMTR-MCNC: 138 MG/DL — HIGH (ref 70–99)
GLUCOSE BLDC GLUCOMTR-MCNC: 141 MG/DL — HIGH (ref 70–99)
GLUCOSE BLDC GLUCOMTR-MCNC: 151 MG/DL — HIGH (ref 70–99)
GLUCOSE SERPL-MCNC: 107 MG/DL — HIGH (ref 70–99)
GRAM STN FLD: SIGNIFICANT CHANGE UP
HCT VFR BLD CALC: 29.8 % — LOW (ref 39–50)
HGB BLD-MCNC: 9.3 G/DL — LOW (ref 13–17)
MAGNESIUM SERPL-MCNC: 2.5 MG/DL — SIGNIFICANT CHANGE UP (ref 1.6–2.6)
MCHC RBC-ENTMCNC: 30 PG — SIGNIFICANT CHANGE UP (ref 27–34)
MCHC RBC-ENTMCNC: 31.2 G/DL — LOW (ref 32–36)
MCV RBC AUTO: 96.1 FL — SIGNIFICANT CHANGE UP (ref 80–100)
METHOD TYPE: SIGNIFICANT CHANGE UP
NRBC # BLD: 0 /100 WBCS — SIGNIFICANT CHANGE UP (ref 0–0)
ORGANISM # SPEC MICROSCOPIC CNT: SIGNIFICANT CHANGE UP
ORGANISM # SPEC MICROSCOPIC CNT: SIGNIFICANT CHANGE UP
PHOSPHATE SERPL-MCNC: 4 MG/DL — SIGNIFICANT CHANGE UP (ref 2.5–4.5)
PLATELET # BLD AUTO: 275 K/UL — SIGNIFICANT CHANGE UP (ref 150–400)
POTASSIUM SERPL-MCNC: 4.2 MMOL/L — SIGNIFICANT CHANGE UP (ref 3.5–5.3)
POTASSIUM SERPL-SCNC: 4.2 MMOL/L — SIGNIFICANT CHANGE UP (ref 3.5–5.3)
RBC # BLD: 3.1 M/UL — LOW (ref 4.2–5.8)
RBC # FLD: 14.7 % — HIGH (ref 10.3–14.5)
SODIUM SERPL-SCNC: 141 MMOL/L — SIGNIFICANT CHANGE UP (ref 135–145)
SPECIMEN SOURCE: SIGNIFICANT CHANGE UP
VALPROATE SERPL-MCNC: 58 UG/ML — SIGNIFICANT CHANGE UP (ref 50–100)
WBC # BLD: 6.13 K/UL — SIGNIFICANT CHANGE UP (ref 3.8–10.5)
WBC # FLD AUTO: 6.13 K/UL — SIGNIFICANT CHANGE UP (ref 3.8–10.5)

## 2023-01-22 PROCEDURE — 99232 SBSQ HOSP IP/OBS MODERATE 35: CPT

## 2023-01-22 PROCEDURE — ZZZZZ: CPT

## 2023-01-22 RX ORDER — SALICYLIC ACID 0.5 %
1 CLEANSER (GRAM) TOPICAL
Refills: 0 | Status: DISCONTINUED | OUTPATIENT
Start: 2023-01-22 | End: 2023-03-18

## 2023-01-22 RX ADMIN — PREGABALIN 1000 MICROGRAM(S): 225 CAPSULE ORAL at 18:10

## 2023-01-22 RX ADMIN — Medication 750 MILLIGRAM(S): at 21:45

## 2023-01-22 RX ADMIN — BUDESONIDE AND FORMOTEROL FUMARATE DIHYDRATE 2 PUFF(S): 160; 4.5 AEROSOL RESPIRATORY (INHALATION) at 17:37

## 2023-01-22 RX ADMIN — Medication 1 APPLICATION(S): at 05:09

## 2023-01-22 RX ADMIN — Medication 1 APPLICATION(S): at 18:11

## 2023-01-22 RX ADMIN — Medication 81 MILLIGRAM(S): at 11:41

## 2023-01-22 RX ADMIN — SENNA PLUS 2 TABLET(S): 8.6 TABLET ORAL at 21:44

## 2023-01-22 RX ADMIN — CHLORHEXIDINE GLUCONATE 15 MILLILITER(S): 213 SOLUTION TOPICAL at 05:08

## 2023-01-22 RX ADMIN — Medication 1 APPLICATION(S): at 18:10

## 2023-01-22 RX ADMIN — Medication 3 MILLILITER(S): at 12:00

## 2023-01-22 RX ADMIN — APIXABAN 5 MILLIGRAM(S): 2.5 TABLET, FILM COATED ORAL at 18:09

## 2023-01-22 RX ADMIN — LEVETIRACETAM 1500 MILLIGRAM(S): 250 TABLET, FILM COATED ORAL at 18:11

## 2023-01-22 RX ADMIN — Medication 1 APPLICATION(S): at 05:23

## 2023-01-22 RX ADMIN — Medication 25 MILLIGRAM(S): at 05:09

## 2023-01-22 RX ADMIN — BUDESONIDE AND FORMOTEROL FUMARATE DIHYDRATE 2 PUFF(S): 160; 4.5 AEROSOL RESPIRATORY (INHALATION) at 05:11

## 2023-01-22 RX ADMIN — Medication 25 MILLIGRAM(S): at 18:11

## 2023-01-22 RX ADMIN — POLYETHYLENE GLYCOL 3350 17 GRAM(S): 17 POWDER, FOR SOLUTION ORAL at 11:42

## 2023-01-22 RX ADMIN — SCOPALAMINE 1 PATCH: 1 PATCH, EXTENDED RELEASE TRANSDERMAL at 00:00

## 2023-01-22 RX ADMIN — Medication 3 MILLILITER(S): at 05:38

## 2023-01-22 RX ADMIN — Medication 1 APPLICATION(S): at 05:08

## 2023-01-22 RX ADMIN — CHLORHEXIDINE GLUCONATE 15 MILLILITER(S): 213 SOLUTION TOPICAL at 18:10

## 2023-01-22 RX ADMIN — SCOPALAMINE 1 PATCH: 1 PATCH, EXTENDED RELEASE TRANSDERMAL at 00:02

## 2023-01-22 RX ADMIN — Medication 750 MILLIGRAM(S): at 11:43

## 2023-01-22 RX ADMIN — ROBINUL 1 MILLIGRAM(S): 0.2 INJECTION INTRAMUSCULAR; INTRAVENOUS at 18:10

## 2023-01-22 RX ADMIN — ROBINUL 1 MILLIGRAM(S): 0.2 INJECTION INTRAMUSCULAR; INTRAVENOUS at 05:09

## 2023-01-22 RX ADMIN — CHLORHEXIDINE GLUCONATE 1 APPLICATION(S): 213 SOLUTION TOPICAL at 05:10

## 2023-01-22 RX ADMIN — INSULIN GLARGINE 10 UNIT(S): 100 INJECTION, SOLUTION SUBCUTANEOUS at 21:45

## 2023-01-22 RX ADMIN — Medication 1 MILLIGRAM(S): at 11:41

## 2023-01-22 RX ADMIN — ATORVASTATIN CALCIUM 20 MILLIGRAM(S): 80 TABLET, FILM COATED ORAL at 21:45

## 2023-01-22 RX ADMIN — SCOPALAMINE 1 PATCH: 1 PATCH, EXTENDED RELEASE TRANSDERMAL at 19:00

## 2023-01-22 RX ADMIN — LEVETIRACETAM 1500 MILLIGRAM(S): 250 TABLET, FILM COATED ORAL at 05:09

## 2023-01-22 RX ADMIN — Medication 3 MILLILITER(S): at 17:37

## 2023-01-22 RX ADMIN — APIXABAN 5 MILLIGRAM(S): 2.5 TABLET, FILM COATED ORAL at 05:10

## 2023-01-22 RX ADMIN — Medication 3 MILLILITER(S): at 00:13

## 2023-01-22 NOTE — PROGRESS NOTE ADULT - SUBJECTIVE AND OBJECTIVE BOX
BRANDON CAMARILLO    [facility] [address]    Allergies    No Known Allergies    Intolerances        PAST MEDICAL & SURGICAL HISTORY:  HTN (hypertension)      HLD (hyperlipidemia)      Diabetes mellitus      Lacunar infarction      BPH (benign prostatic hyperplasia)      CHF (congestive heart failure)      Chronic obstructive pulmonary disease, unspecified COPD type      S/P CABG (coronary artery bypass graft)  2014          FAMILY HISTORY:      Home Medications:  aspirin 81 mg oral delayed release tablet: 1 tab(s) orally once a day (12 Jun 2020 17:35)  Liquifilm Tears preserved ophthalmic solution: 1 drop(s) to each affected eye 2 times a day (12 Jun 2020 17:35)      MEDICATIONS  (STANDING):  albuterol/ipratropium for Nebulization 3 milliLiter(s) Nebulizer every 6 hours  apixaban 5 milliGRAM(s) Oral every 12 hours  aspirin  chewable 81 milliGRAM(s) Oral daily  atorvastatin 20 milliGRAM(s) Oral at bedtime  bacitracin   Ointment 1 Application(s) Topical two times a day  bacitracin   Ointment 1 Application(s) Topical two times a day  budesonide 160 MICROgram(s)/formoterol 4.5 MICROgram(s) Inhaler 2 Puff(s) Inhalation two times a day  chlorhexidine 0.12% Liquid 15 milliLiter(s) Oral Mucosa every 12 hours  chlorhexidine 2% Cloths 1 Application(s) Topical <User Schedule>  collagenase Ointment 1 Application(s) Topical two times a day  cyanocobalamin 1000 MICROGram(s) Oral daily  dextrose 5%. 1000 milliLiter(s) (50 mL/Hr) IV Continuous <Continuous>  dextrose 5%. 1000 milliLiter(s) (100 mL/Hr) IV Continuous <Continuous>  dextrose 50% Injectable 25 Gram(s) IV Push once  dextrose 50% Injectable 12.5 Gram(s) IV Push once  dextrose 50% Injectable 25 Gram(s) IV Push once  diphenhydrAMINE Injectable 50 milliGRAM(s) IV Push once  folic acid 1 milliGRAM(s) Oral daily  glucagon  Injectable 1 milliGRAM(s) IntraMuscular once  glycopyrrolate 1 milliGRAM(s) Oral two times a day  insulin glargine Injectable (LANTUS) 10 Unit(s) SubCutaneous at bedtime  insulin lispro (ADMELOG) corrective regimen sliding scale   SubCutaneous three times a day before meals  insulin lispro (ADMELOG) corrective regimen sliding scale   SubCutaneous at bedtime  levETIRAcetam 1500 milliGRAM(s) Oral two times a day  metoprolol tartrate 25 milliGRAM(s) Oral two times a day  polyethylene glycol 3350 17 Gram(s) Oral daily  saline laxative (FLEET) Rectal Enema 1 Enema Rectal once  scopolamine 1 mG/72 Hr(s) Patch 1 Patch Transdermal every 72 hours  senna 2 Tablet(s) Oral at bedtime  silver sulfADIAZINE 1% Cream 1 Application(s) Topical two times a day  valproic  acid Syrup 750 milliGRAM(s) Oral <User Schedule>    MEDICATIONS  (PRN):  acetaminophen     Tablet .. 650 milliGRAM(s) Oral every 6 hours PRN Temp greater or equal to 38C (100.4F), Mild Pain (1 - 3)  albuterol    90 MICROgram(s) HFA Inhaler 2 Puff(s) Inhalation every 6 hours PRN Shortness of Breath and/or Wheezing  aluminum hydroxide/magnesium hydroxide/simethicone Suspension 30 milliLiter(s) Oral every 4 hours PRN Dyspepsia  dextrose Oral Gel 15 Gram(s) Oral once PRN Blood Glucose LESS THAN 70 milliGRAM(s)/deciliter  oxycodone    5 mG/acetaminophen 325 mG 1 Tablet(s) Oral every 4 hours PRN Moderate Pain (4 - 6)      Diet, NPO with Tube Feed:   Tube Feeding Modality: Gastrostomy  Glucerna 1.2 Pedrito  Total Volume for 24 Hours (mL): 1440  Continuous  Until Goal Tube Feed Rate (mL per Hour): 60  Tube Feed Duration (in Hours): 24  Tube Feed Start Time: 14:30  Free Water Flush  Free Water Flush Instructions:  30 ml/hr via pump  Liquid Protein Supplement     Qty per Day:  1 (01-09-23 @ 13:45) [Active]          Vital Signs Last 24 Hrs  T(C): 36.3 (22 Jan 2023 04:40), Max: 37.3 (21 Jan 2023 11:49)  T(F): 97.3 (22 Jan 2023 04:40), Max: 99.2 (21 Jan 2023 11:49)  HR: 74 (22 Jan 2023 06:30) (55 - 91)  BP: 114/61 (22 Jan 2023 04:40) (92/54 - 123/68)  BP(mean): --  RR: 16 (22 Jan 2023 04:40) (16 - 20)  SpO2: 100% (22 Jan 2023 06:30) (96% - 100%)    Parameters below as of 22 Jan 2023 06:30  Patient On (Oxygen Delivery Method): ventilator            Mode: standby, FiO2: 35      LABS:                        9.3    6.13  )-----------( 275      ( 22 Jan 2023 06:42 )             29.8     01-22    141  |  105  |  28<H>  ----------------------------<  107<H>  4.2   |  30  |  0.84    Ca    9.0      22 Jan 2023 06:42  Phos  4.0     01-22  Mg     2.5     01-22                WBC:  WBC Count: 6.13 K/uL (01-22 @ 06:42)  WBC Count: 7.23 K/uL (01-21 @ 07:20)  WBC Count: 6.86 K/uL (01-20 @ 07:50)  WBC Count: 7.99 K/uL (01-19 @ 07:42)      MICROBIOLOGY:  RECENT CULTURES:  01-19 Trach Asp Tracheal Aspirate Enterobacter cloacae complex   Few polymorphonuclear leukocytes per low power field  Numerous Squamous epithelial cells per low power field  Few Gram Negative Rods seen per oil power field  Few Gram Positive Cocci seen per oil power field  Rare Gram Positive Rods seen per oil power field   Numerous Enterobacter cloacae complex  Normal Respiratory Larissa absent    01-19 .Blood Blood-Peripheral XXXX XXXX   No growth to date.    01-19 .Blood Blood-Peripheral XXXX XXXX   No growth to date.                    Sodium:  Sodium, Serum: 141 mmol/L (01-22 @ 06:42)  Sodium, Serum: 143 mmol/L (01-21 @ 07:20)  Sodium, Serum: 144 mmol/L (01-20 @ 07:50)  Sodium, Serum: 145 mmol/L (01-19 @ 07:42)      0.84 mg/dL 01-22 @ 06:42  0.86 mg/dL 01-21 @ 07:20  0.96 mg/dL 01-20 @ 07:50  0.99 mg/dL 01-19 @ 07:42      Hemoglobin:  Hemoglobin: 9.3 g/dL (01-22 @ 06:42)  Hemoglobin: 9.2 g/dL (01-21 @ 07:20)  Hemoglobin: 9.8 g/dL (01-20 @ 07:50)  Hemoglobin: 9.1 g/dL (01-19 @ 07:42)      Platelets: Platelet Count - Automated: 275 K/uL (01-22 @ 06:42)  Platelet Count - Automated: 267 K/uL (01-21 @ 07:20)  Platelet Count - Automated: 270 K/uL (01-20 @ 07:50)  Platelet Count - Automated: 260 K/uL (01-19 @ 07:42)              RADIOLOGY & ADDITIONAL STUDIES:      MICROBIOLOGY:  RECENT CULTURES:  01-19 Trach Asp Tracheal Aspirate Enterobacter cloacae complex   Few polymorphonuclear leukocytes per low power field  Numerous Squamous epithelial cells per low power field  Few Gram Negative Rods seen per oil power field  Few Gram Positive Cocci seen per oil power field  Rare Gram Positive Rods seen per oil power field   Numerous Enterobacter cloacae complex  Normal Respiratory Larissa absent    01-19 .Blood Blood-Peripheral XXXX XXXX   No growth to date.    01-19 .Blood Blood-Peripheral XXXX XXXX   No growth to date.

## 2023-01-22 NOTE — CHART NOTE - NSCHARTNOTEFT_GEN_A_CORE
Asked by attending to place peripheral IV.  24 gauge heplock placed in R hand.  Flushes well with good venous return.  Patient tolerated the procedure well without complication and was left in no acute distress.  RN and attending notified.  Will removed RUE midline.

## 2023-01-22 NOTE — PROGRESS NOTE ADULT - SUBJECTIVE AND OBJECTIVE BOX
Patient is a 72y old  Male who presents with a chief complaint of s/p cardiac arrest, hypoglycemia (19 Jan 2023 08:12)    INTERVAL HPI/OVERNIGHT EVENTS:   none  SUBJECTIVE: no complaints of pain/discomfort/dyspnea    MEDICATIONS  (STANDING):  albuterol/ipratropium for Nebulization 3 milliLiter(s) Nebulizer every 6 hours  apixaban 5 milliGRAM(s) Oral every 12 hours  aspirin  chewable 81 milliGRAM(s) Oral daily  atorvastatin 20 milliGRAM(s) Oral at bedtime  bacitracin   Ointment 1 Application(s) Topical two times a day  bacitracin   Ointment 1 Application(s) Topical two times a day  budesonide 160 MICROgram(s)/formoterol 4.5 MICROgram(s) Inhaler 2 Puff(s) Inhalation two times a day  chlorhexidine 0.12% Liquid 15 milliLiter(s) Oral Mucosa every 12 hours  chlorhexidine 2% Cloths 1 Application(s) Topical <User Schedule>  collagenase Ointment 1 Application(s) Topical two times a day  cyanocobalamin 1000 MICROGram(s) Oral daily  dextrose 5%. 1000 milliLiter(s) (100 mL/Hr) IV Continuous <Continuous>  dextrose 5%. 1000 milliLiter(s) (50 mL/Hr) IV Continuous <Continuous>  dextrose 50% Injectable 25 Gram(s) IV Push once  dextrose 50% Injectable 12.5 Gram(s) IV Push once  dextrose 50% Injectable 25 Gram(s) IV Push once  diphenhydrAMINE Injectable 50 milliGRAM(s) IV Push once  folic acid 1 milliGRAM(s) Oral daily  glucagon  Injectable 1 milliGRAM(s) IntraMuscular once  glycopyrrolate 1 milliGRAM(s) Oral two times a day  insulin glargine Injectable (LANTUS) 10 Unit(s) SubCutaneous at bedtime  insulin lispro (ADMELOG) corrective regimen sliding scale   SubCutaneous three times a day before meals  insulin lispro (ADMELOG) corrective regimen sliding scale   SubCutaneous at bedtime  levETIRAcetam 1500 milliGRAM(s) Oral two times a day  metoprolol tartrate 25 milliGRAM(s) Oral two times a day  polyethylene glycol 3350 17 Gram(s) Oral daily  saline laxative (FLEET) Rectal Enema 1 Enema Rectal once  scopolamine 1 mG/72 Hr(s) Patch 1 Patch Transdermal every 72 hours  senna 2 Tablet(s) Oral at bedtime  silver sulfADIAZINE 1% Cream 1 Application(s) Topical two times a day  valproic  acid Syrup 750 milliGRAM(s) Oral <User Schedule>    MEDICATIONS  (PRN):  acetaminophen     Tablet .. 650 milliGRAM(s) Oral every 6 hours PRN Temp greater or equal to 38C (100.4F), Mild Pain (1 - 3)  albuterol    90 MICROgram(s) HFA Inhaler 2 Puff(s) Inhalation every 6 hours PRN Shortness of Breath and/or Wheezing  aluminum hydroxide/magnesium hydroxide/simethicone Suspension 30 milliLiter(s) Oral every 4 hours PRN Dyspepsia  dextrose Oral Gel 15 Gram(s) Oral once PRN Blood Glucose LESS THAN 70 milliGRAM(s)/deciliter  oxycodone    5 mG/acetaminophen 325 mG 1 Tablet(s) Oral every 4 hours PRN Moderate Pain (4 - 6)      Allergies    No Known Allergies    Intolerances    Vital Signs Last 24 Hrs  T(C): 36.3 (22 Jan 2023 04:40), Max: 37.3 (21 Jan 2023 11:49)  T(F): 97.3 (22 Jan 2023 04:40), Max: 99.2 (21 Jan 2023 11:49)  HR: 64 (22 Jan 2023 09:16) (64 - 91)  BP: 114/61 (22 Jan 2023 04:40) (92/54 - 123/68)  BP(mean): --  RR: 18 (22 Jan 2023 09:16) (16 - 20)  SpO2: 100% (22 Jan 2023 09:16) (96% - 100%)    Parameters below as of 22 Jan 2023 09:16  Patient On (Oxygen Delivery Method): tracheostomy collar  O2 Flow (L/min): 6  O2 Concentration (%): 35    PHYSICAL EXAM:  GENERAL: NAD, well-groomed, well-developed  HEAD:  Atraumatic, Normocephalic  EYES: EOMI, PERRLA, conjunctiva and sclera clear  ENMT: No tonsillar erythema, exudates, or enlargement; Moist mucous membranes, Good dentition, No lesions  NECK: Supple,   NERVOUS SYSTEM: sleeping    CHEST/LUNG: Coarse bs diffusely   HEART: Regular rate and rhythm; No murmurs, rubs, or gallops  ABDOMEN: Soft, Nontender, Nondistended; Bowel sounds present + peg  EXTREMITIES:  2+ Peripheral Pulses, No clubbing, cyanosis, or edema  SKIN: stage 4 sacral , skin tear to face where EEG leads        Labs                                                  9.3    6.13  )-----------( 275      ( 22 Jan 2023 06:42 )             29.8   01-22    141  |  105  |  28<H>  ----------------------------<  107<H>  4.2   |  30  |  0.84    Ca    9.0      22 Jan 2023 06:42  Phos  4.0     01-22  Mg     2.5     01-22          CAPILLARY BLOOD GLUCOSE    CAPILLARY BLOOD GLUCOSE      POCT Blood Glucose.: 121 mg/dL (22 Jan 2023 08:12)  POCT Blood Glucose.: 126 mg/dL (22 Jan 2023 06:39)  POCT Blood Glucose.: 151 mg/dL (22 Jan 2023 00:19)  POCT Blood Glucose.: 144 mg/dL (21 Jan 2023 21:05)  POCT Blood Glucose.: 90 mg/dL (21 Jan 2023 17:40)  POCT Blood Glucose.: 60 mg/dL (21 Jan 2023 16:08)  POCT Blood Glucose.: 59 mg/dL (21 Jan 2023 16:07)  POCT Blood Glucose.: 129 mg/dL (21 Jan 2023 12:07)

## 2023-01-22 NOTE — PROGRESS NOTE ADULT - ASSESSMENT
REVIEW OF SYMPTOMS      Able to give (reliable) ROS  NO     PHYSICAL EXAM    HEENT Unremarkable  atraumatic   RESP Fair air entry EXP prolonged    Harsh breath sound Resp distres mild   CARDIAC S1 S2 No S3     NO JVD    ABDOMEN SOFT BS PRESENT NOT DISTENDED No hepatosplenomegaly   PEDAL EDEMA present No calf tenderness  NO rash       GENERAL DATA .   GOC.  12/11/2022 full code       ALLGY.  nka                            WT. ..  12/2/2022 86  BMI. ..    12/2/2022 29                          ICU STAY.  .. 11/29-12/9  COVID.   .. 12/2/2022 scv2 (-)   .. 11/20/2022 scv2 (-)   BEST PRACTICE ISSUES.    HOB ELEVATN. Yes  DVT PPLX.    .. 1/3/2023 apixa 5.2 (vte)  12/12 l Subclav throm  ALEJO PPLX.   ..      INFN PPLX.   .. 11/25 chlorhex .12%   .. 11/14 chlorhex 2%   SP SW ANTWAN.         DIET.    ..  12/15 glucerna 1.2 1440 gt   IV fl.  ..            PROCEDURES.  .. 12/19 debridement of sacral wound   .. 12/5/2022 trach    ABGS.  1/6/2023 ac 16/450/.3/5 746/49/123     VS/ PO/IO/ VENT/ DRIPS.  1/22/2023 afeb 80 110/50     PATIENT PRESENTATION.  72 m doa 11/14/2022 cac    PMH  PMH CAD s/p PCI with stent 2015 and CABG 2014,   PMH  s/p medtronic PPM,   PMH CVA (lacunar infarct)    HOSPITAL COURSE   .. 1) PEA arrest with ROSC after approximately 5 min 11/14  Now communicative   .. 2) DVT 12/12 l Subclav thromS 12/15/2022 lvnx 80.2 1/3 changed to apixaban 5.2  .. 3) TRACH 12/5/2022 trach  .. 4) PEG12/5/2022 peg   .. 5) Cellulitis hand absc 12/13 mod enterococcus fecalis  staph epi 12/12-12/15/2022  cephalexin 12/15 vanco once  12/16-12/19 zosyn  .. 6) Vent weaning     PROBLEM ASSESSMENT RECOMMENDATIONS.  WEANING   .. wean as tolerated  .. 1/4/2022  If able to tolerate cpap 5/5 x 2 h will place on tc   .. 1/5/2023 DW Dr Valdovinos Tried on 35% tc   .. 1/6/2023 DW RT Pt to be on monitor during weaning trials and to be placed on vent at night and trach collar during daytime as tolerated   .. 1/7/2023 above reinforced to RT   .. 1/8/2023 placed on trach collar but has lot of secretns lasted 45 min   1/11/2023 tolerated tc several h  1/15/2023 Has been tolerating day time trach collar   TRACH LEAK  .. 1/9/2023 Notified by Hospitalist that pt is losing volunmes and that she is calling surgery to see if trach balloon has leak  HEMOPTYSIS  .. 1/13/2023 Pt having mild hemoptysis last 2 d   .. w 1/13-1/14-1/22/2023 w 10.4- 8.6 - 6.1   .. Hb 1/13-1/22/2023 Hb 9.7 - 9.3   .. Cr 1/13/2023 Cr .9   .. ct 1/13/2023 ct ch cw 1/4/2020 Stable bectasis and cystic changes basal l lower lobe and lingula Stable ssa r base   .. 1/13/2023 Hemoptysis likely sec to suctioning trauma in setting of full dose ac   .. 1/13/2023 VTE unlikely as cause of ac as pt is on fd ac   .. 1/13/2023 was eval by surgrey no trach bleed as dw Dr Arzate   .. 1/13/2023 infection is possibe cause sergio given bectasis so if persists will consider abio   .. 1/20/2023 dw son will try cpap overnight and escalate to ac if resp distress   INFECTION.  .. w 1/17-1/18-1/20-1/21/2023 w 8.3- 7.9 - 6.8 - 7.2    .. pr 1/17-1/20/2023 (-) - (-)   .. 1/16-1/20/2023 Rocephin started by hospitalist dced   CHF   .. 11/14/2022 echo mod decr segmental lvsf apical lateral apiccal ant segment are abn takotsubo cmpthy pasp 42 .  .. Monitor for chf has chr hfref per echo   COPD   .. 12/30 symbicort 160   .. 1/7/2023 glycopyrrolate 1.2   .. 1/9/2023 ipratropium  .. 1/15/2023 scopolamine   .. continue bd ics for copd   Anemia.  .. Hb 1/12-1/14-1/19-1/20-7/21/2023       Hb 9.8- 10 -9.1- 9.8  - 9.2   .. target hb 7 (+)  .. monitor   sp cac   .. good neuro recovery awake alert interactive   VTE  .. 1/3/2023 apixa 5.2 (vte)      OVERALL   WEAN as told 1/20/2023 to try noct cpap and day time tc (which he has been tolerating  FLAILING OF LOWER EXTR started 1/16 on depakote   suggest Neuro followup 1/19/2023 myoclonic jerks improvd       TIME SPENT   Over 25 minutes aggregate care time spent on encounter; activities included   direct patient care, counseling and/or coordinating care reviewing notes, lab data/ imaging , discussion with multidisciplinary team/ patient  /family and explaining in detail risks, benefits, alternatives  of the recommendations     BRANDON CAMARILLO

## 2023-01-22 NOTE — PROGRESS NOTE ADULT - ASSESSMENT
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 ACLS for PEA arrest and ROSC returned after 5 minutes. Cardiac arrest possibly secondary to severe hypoglycemia from eating less and not tracking blood sugar carefully. Hospital course complicated by 1) prolonged hypoxemic respiratory failure. sp trach and peg 2) left upper extremity DVT and placed on eliquis 3) multiple infections (enteroccoal facelis cellutis, infected left hand hematoma, tracheobronchitis secondary to citrobacr 4) tonic clonic seizure - on keppra/depakoate- currently undergoing reevaluation by NEUROLOGY 5) fever of 102 on 1/19. INFECTIOUS DISEASE reconsulted and pan culture ordered     CARDIOLOGY  # Cad s/p PCI w/ stent  # CABG  # HF w/ PeF  # 2nd degree heart block  # sp PPM placement   # cardiac arrest- resolved  - ECHO Takotsubo cardiomyopathy. EF 50-55%   - lopressor 25 mg po bid, aspirin 81 mg po daily, lipitor 40 mg po qhs    PULMNOLOGY  # COPD  # prolonged hypoxemic respiratory failure  - s/p intubation for cardiac arrest  - failed extubation now on trach to vent  - hospital course complicated by blood secretions from trach. bleeding has self resolved     INFECTIOUS DISEASE  # trachobronchitis secondary to citrobacter?? - resolved  # enterococcal faecalis cellulitis - resolved   # infected left hand hematoma - s/p bedside debridement 12/13   # fever 102 on 1/19  -12/13 Hand surgery consult appreciated, s/p bedside debridement.   -   1/20/2023  rocephin day # 4. still had fever. INFECTIOUS DISEASE reconsulted by collegaue  - blood culture 1/19- x 2 pending  - urine culture 1/19- pending  stage 3 decubuti--will get wound care re consult  will get specialty bed  1/21/2023 reccs  by wound pt were ordered      HEME/ONC  # LUE VTE wi  -LUE venous duplex : occlusive thrombus in the left mid subclavian vein with partial slow flow detected in the lateral subclavian vein.  -LUE arterial Duplex neg   --change lovenox to eliquis 5 mg bid.     NEUROLOGY  # anoxic brain injury  # tonic clonic seizure  - had new flailing of the legs today which are concerning fro myoclonus vs seizure   - on keppmichael depakaote   " Dr. Loo. aware and ordered VEEG which now shows myoclonic seizure. will reach out to Dr. Loo as he is still seizure "    GASTROENTEROLOGY  # Shock liver.- resolved  due to PEA arrest    ENDOCRINOLOGY  - DM2   - A1c 8.8%  - on lantus/RISS    DERMATOLOGY  - Ustageable sacral pressure wound   - stage 3 sacral decub ulcer   - s/p debridement 12/19  - wound care  1/21/2023 stage 4 sacral  decubuti recommendations ordered    PLAN  - c/w current level of care  - panculture  - follow up with INFECTIOUS DISEASE--> remove midline ,   - follow up with NEUROLOGY over EEG and readjustment of seizure medication--> reccs are to increase to 750 bid level at 49   stage 4 decubiti sacrum wound pt reccs have been ordered   functional quad-- supportive care    Turn and position the  patient  frequently per  protocol to prevent decubiti ulcer,  skin breakdown and/or further skin breakdown.  Preventative measures       dvt ppx>>>on DOAC  fall, aspiration  precautions    Dipso: Patient has no insurance and Family in process of getting guardianship. Patient will then need placement.    updated son at bedside

## 2023-01-23 LAB
GLUCOSE BLDC GLUCOMTR-MCNC: 134 MG/DL — HIGH (ref 70–99)
GLUCOSE BLDC GLUCOMTR-MCNC: 134 MG/DL — HIGH (ref 70–99)
GLUCOSE BLDC GLUCOMTR-MCNC: 140 MG/DL — HIGH (ref 70–99)
GLUCOSE BLDC GLUCOMTR-MCNC: 153 MG/DL — HIGH (ref 70–99)

## 2023-01-23 PROCEDURE — 99232 SBSQ HOSP IP/OBS MODERATE 35: CPT

## 2023-01-23 RX ADMIN — Medication 1 APPLICATION(S): at 05:51

## 2023-01-23 RX ADMIN — APIXABAN 5 MILLIGRAM(S): 2.5 TABLET, FILM COATED ORAL at 18:19

## 2023-01-23 RX ADMIN — Medication 3 MILLILITER(S): at 05:53

## 2023-01-23 RX ADMIN — Medication 1 APPLICATION(S): at 11:44

## 2023-01-23 RX ADMIN — Medication 1 APPLICATION(S): at 18:18

## 2023-01-23 RX ADMIN — ROBINUL 1 MILLIGRAM(S): 0.2 INJECTION INTRAMUSCULAR; INTRAVENOUS at 05:50

## 2023-01-23 RX ADMIN — Medication 1 APPLICATION(S): at 18:30

## 2023-01-23 RX ADMIN — BUDESONIDE AND FORMOTEROL FUMARATE DIHYDRATE 2 PUFF(S): 160; 4.5 AEROSOL RESPIRATORY (INHALATION) at 17:26

## 2023-01-23 RX ADMIN — LEVETIRACETAM 1500 MILLIGRAM(S): 250 TABLET, FILM COATED ORAL at 18:18

## 2023-01-23 RX ADMIN — Medication 1 MILLIGRAM(S): at 11:36

## 2023-01-23 RX ADMIN — CHLORHEXIDINE GLUCONATE 1 APPLICATION(S): 213 SOLUTION TOPICAL at 05:52

## 2023-01-23 RX ADMIN — Medication 25 MILLIGRAM(S): at 18:17

## 2023-01-23 RX ADMIN — ROBINUL 1 MILLIGRAM(S): 0.2 INJECTION INTRAMUSCULAR; INTRAVENOUS at 18:18

## 2023-01-23 RX ADMIN — Medication 1 APPLICATION(S): at 05:53

## 2023-01-23 RX ADMIN — POLYETHYLENE GLYCOL 3350 17 GRAM(S): 17 POWDER, FOR SOLUTION ORAL at 11:37

## 2023-01-23 RX ADMIN — Medication 3 MILLILITER(S): at 00:37

## 2023-01-23 RX ADMIN — CHLORHEXIDINE GLUCONATE 15 MILLILITER(S): 213 SOLUTION TOPICAL at 18:20

## 2023-01-23 RX ADMIN — PREGABALIN 1000 MICROGRAM(S): 225 CAPSULE ORAL at 11:36

## 2023-01-23 RX ADMIN — Medication 3 MILLILITER(S): at 11:24

## 2023-01-23 RX ADMIN — INSULIN GLARGINE 10 UNIT(S): 100 INJECTION, SOLUTION SUBCUTANEOUS at 21:06

## 2023-01-23 RX ADMIN — Medication 750 MILLIGRAM(S): at 21:09

## 2023-01-23 RX ADMIN — ATORVASTATIN CALCIUM 20 MILLIGRAM(S): 80 TABLET, FILM COATED ORAL at 21:07

## 2023-01-23 RX ADMIN — Medication 3 MILLILITER(S): at 17:26

## 2023-01-23 RX ADMIN — SENNA PLUS 2 TABLET(S): 8.6 TABLET ORAL at 21:08

## 2023-01-23 RX ADMIN — Medication 81 MILLIGRAM(S): at 11:36

## 2023-01-23 RX ADMIN — Medication 25 MILLIGRAM(S): at 05:50

## 2023-01-23 RX ADMIN — Medication 750 MILLIGRAM(S): at 08:09

## 2023-01-23 RX ADMIN — APIXABAN 5 MILLIGRAM(S): 2.5 TABLET, FILM COATED ORAL at 05:50

## 2023-01-23 RX ADMIN — Medication 1: at 08:10

## 2023-01-23 RX ADMIN — Medication 1 APPLICATION(S): at 18:29

## 2023-01-23 RX ADMIN — BUDESONIDE AND FORMOTEROL FUMARATE DIHYDRATE 2 PUFF(S): 160; 4.5 AEROSOL RESPIRATORY (INHALATION) at 05:54

## 2023-01-23 RX ADMIN — ALBUTEROL 2 PUFF(S): 90 AEROSOL, METERED ORAL at 21:51

## 2023-01-23 RX ADMIN — LEVETIRACETAM 1500 MILLIGRAM(S): 250 TABLET, FILM COATED ORAL at 05:50

## 2023-01-23 NOTE — PROGRESS NOTE ADULT - SUBJECTIVE AND OBJECTIVE BOX
BRANDON CAMARILLO    [facility] [address] [address]    Allergies    No Known Allergies    Intolerances        PAST MEDICAL & SURGICAL HISTORY:  HTN (hypertension)      HLD (hyperlipidemia)      Diabetes mellitus      Lacunar infarction      BPH (benign prostatic hyperplasia)      CHF (congestive heart failure)      Chronic obstructive pulmonary disease, unspecified COPD type      S/P CABG (coronary artery bypass graft)  2014          FAMILY HISTORY:      Home Medications:  aspirin 81 mg oral delayed release tablet: 1 tab(s) orally once a day (12 Jun 2020 17:35)  Liquifilm Tears preserved ophthalmic solution: 1 drop(s) to each affected eye 2 times a day (12 Jun 2020 17:35)      MEDICATIONS  (STANDING):  albuterol/ipratropium for Nebulization 3 milliLiter(s) Nebulizer every 6 hours  apixaban 5 milliGRAM(s) Oral every 12 hours  aspirin  chewable 81 milliGRAM(s) Oral daily  atorvastatin 20 milliGRAM(s) Oral at bedtime  bacitracin   Ointment 1 Application(s) Topical two times a day  bacitracin   Ointment 1 Application(s) Topical two times a day  budesonide 160 MICROgram(s)/formoterol 4.5 MICROgram(s) Inhaler 2 Puff(s) Inhalation two times a day  chlorhexidine 0.12% Liquid 15 milliLiter(s) Oral Mucosa every 12 hours  chlorhexidine 2% Cloths 1 Application(s) Topical <User Schedule>  collagenase Ointment 1 Application(s) Topical two times a day  cyanocobalamin 1000 MICROGram(s) Oral daily  dextrose 5%. 1000 milliLiter(s) (50 mL/Hr) IV Continuous <Continuous>  dextrose 5%. 1000 milliLiter(s) (100 mL/Hr) IV Continuous <Continuous>  dextrose 50% Injectable 25 Gram(s) IV Push once  dextrose 50% Injectable 12.5 Gram(s) IV Push once  dextrose 50% Injectable 25 Gram(s) IV Push once  diphenhydrAMINE Injectable 50 milliGRAM(s) IV Push once  folic acid 1 milliGRAM(s) Oral daily  glucagon  Injectable 1 milliGRAM(s) IntraMuscular once  glycopyrrolate 1 milliGRAM(s) Oral two times a day  insulin glargine Injectable (LANTUS) 10 Unit(s) SubCutaneous at bedtime  insulin lispro (ADMELOG) corrective regimen sliding scale   SubCutaneous three times a day before meals  insulin lispro (ADMELOG) corrective regimen sliding scale   SubCutaneous at bedtime  levETIRAcetam 1500 milliGRAM(s) Oral two times a day  metoprolol tartrate 25 milliGRAM(s) Oral two times a day  polyethylene glycol 3350 17 Gram(s) Oral daily  saline laxative (FLEET) Rectal Enema 1 Enema Rectal once  scopolamine 1 mG/72 Hr(s) Patch 1 Patch Transdermal every 72 hours  senna 2 Tablet(s) Oral at bedtime  silver sulfADIAZINE 1% Cream 1 Application(s) Topical two times a day  valproic  acid Syrup 750 milliGRAM(s) Oral <User Schedule>  vitamin A &amp; D Ointment 1 Application(s) Topical two times a day    MEDICATIONS  (PRN):  acetaminophen     Tablet .. 650 milliGRAM(s) Oral every 6 hours PRN Temp greater or equal to 38C (100.4F), Mild Pain (1 - 3)  albuterol    90 MICROgram(s) HFA Inhaler 2 Puff(s) Inhalation every 6 hours PRN Shortness of Breath and/or Wheezing  aluminum hydroxide/magnesium hydroxide/simethicone Suspension 30 milliLiter(s) Oral every 4 hours PRN Dyspepsia  dextrose Oral Gel 15 Gram(s) Oral once PRN Blood Glucose LESS THAN 70 milliGRAM(s)/deciliter  oxycodone    5 mG/acetaminophen 325 mG 1 Tablet(s) Oral every 4 hours PRN Moderate Pain (4 - 6)      Diet, NPO with Tube Feed:   Tube Feeding Modality: Gastrostomy  Glucerna 1.2 Pedrito  Total Volume for 24 Hours (mL): 1440  Continuous  Until Goal Tube Feed Rate (mL per Hour): 60  Tube Feed Duration (in Hours): 24  Tube Feed Start Time: 14:30  Free Water Flush  Free Water Flush Instructions:  30 ml/hr via pump  Liquid Protein Supplement     Qty per Day:  1 (01-09-23 @ 13:45) [Active]          Vital Signs Last 24 Hrs  T(C): 36.8 (23 Jan 2023 05:24), Max: 36.9 (22 Jan 2023 16:44)  T(F): 98.3 (23 Jan 2023 05:24), Max: 98.4 (22 Jan 2023 16:44)  HR: 65 (23 Jan 2023 05:45) (59 - 86)  BP: 116/73 (23 Jan 2023 05:24) (109/61 - 116/73)  BP(mean): --  RR: 16 (23 Jan 2023 05:24) (16 - 18)  SpO2: 100% (23 Jan 2023 05:45) (96% - 100%)    Parameters below as of 23 Jan 2023 01:33  Patient On (Oxygen Delivery Method): ventilator            Mode: standby, FiO2: 35      LABS:                        9.3    6.13  )-----------( 275      ( 22 Jan 2023 06:42 )             29.8     01-22    141  |  105  |  28<H>  ----------------------------<  107<H>  4.2   |  30  |  0.84    Ca    9.0      22 Jan 2023 06:42  Phos  4.0     01-22  Mg     2.5     01-22                WBC:  WBC Count: 6.13 K/uL (01-22 @ 06:42)  WBC Count: 7.23 K/uL (01-21 @ 07:20)  WBC Count: 6.86 K/uL (01-20 @ 07:50)      MICROBIOLOGY:  RECENT CULTURES:  01-19 Trach Asp Tracheal Aspirate Enterobacter cloacae complex   Few polymorphonuclear leukocytes per low power field  Numerous Squamous epithelial cells per low power field  Few Gram Negative Rods seen per oil power field  Few Gram Positive Cocci seen per oil power field  Rare Gram Positive Rods seen per oil power field   Numerous Enterobacter cloacae complex  Normal Respiratory Larissa absent    01-19 .Blood Blood-Peripheral XXXX XXXX   No growth to date.    01-19 .Blood Blood-Peripheral XXXX XXXX   No growth to date.                    Sodium:  Sodium, Serum: 141 mmol/L (01-22 @ 06:42)  Sodium, Serum: 143 mmol/L (01-21 @ 07:20)  Sodium, Serum: 144 mmol/L (01-20 @ 07:50)      0.84 mg/dL 01-22 @ 06:42  0.86 mg/dL 01-21 @ 07:20  0.96 mg/dL 01-20 @ 07:50      Hemoglobin:  Hemoglobin: 9.3 g/dL (01-22 @ 06:42)  Hemoglobin: 9.2 g/dL (01-21 @ 07:20)  Hemoglobin: 9.8 g/dL (01-20 @ 07:50)      Platelets: Platelet Count - Automated: 275 K/uL (01-22 @ 06:42)  Platelet Count - Automated: 267 K/uL (01-21 @ 07:20)  Platelet Count - Automated: 270 K/uL (01-20 @ 07:50)              RADIOLOGY & ADDITIONAL STUDIES:      MICROBIOLOGY:  RECENT CULTURES:  01-19 Trach Asp Tracheal Aspirate Enterobacter cloacae complex   Few polymorphonuclear leukocytes per low power field  Numerous Squamous epithelial cells per low power field  Few Gram Negative Rods seen per oil power field  Few Gram Positive Cocci seen per oil power field  Rare Gram Positive Rods seen per oil power field   Numerous Enterobacter cloacae complex  Normal Respiratory Larissa absent    01-19 .Blood Blood-Peripheral XXXX XXXX   No growth to date.    01-19 .Blood Blood-Peripheral XXXX XXXX   No growth to date.

## 2023-01-23 NOTE — PROGRESS NOTE ADULT - ASSESSMENT
71 yo male w/ pmhx of COPD, CAD sp PCI w/ stent, CABG 2014, HF w/ PeF, 2nd degree heart block, sp PPM, HTN, DM, CKD Stage 3, CVA- lacunar infarcts admitted to ICU originally for cardiac arrest. ACLS for PEA arrest and ROSC returned after 5 minutes. Cardiac arrest possibly secondary to severe hypoglycemia from eating less and not tracking blood sugar carefully. Hospital course complicated by 1) prolonged hypoxemic respiratory failure. sp trach and peg 2) left upper extremity DVT and placed on eliquis 3) multiple infections (enteroccoal facelis cellutis, infected left hand hematoma, tracheobronchitis secondary to citrobacr 4) tonic clonic seizure - on keppra/depakoate- currently undergoing reevaluation by NEUROLOGY 5) fever of 102 on 1/19. INFECTIOUS DISEASE reconsulted and pan culture ordered     CARDIOLOGY  # Cad s/p PCI w/ stent  # CABG  # HF w/ PeF  # 2nd degree heart block  # sp PPM placement   # cardiac arrest- resolved  - ECHO Takotsubo cardiomyopathy. EF 50-55%   - lopressor 25 mg po bid, aspirin 81 mg po daily, lipitor 40 mg po qhs    PULMNOLOGY  # COPD  # prolonged hypoxemic respiratory failure  - s/p intubation for cardiac arrest  - failed extubation now on trach to vent  - hospital course complicated by blood secretions from trach. bleeding has self resolved     INFECTIOUS DISEASE  # trachobronchitis secondary to citrobacter?? - resolved  # enterococcal faecalis cellulitis - resolved   # infected left hand hematoma - s/p bedside debridement 12/13   # fever 102 on 1/19  -12/13 Hand surgery consult appreciated, s/p bedside debridement.   -   1/20/2023  rocephin day # 4. still had fever. INFECTIOUS DISEASE reconsulted by collegaue  - blood culture 1/19- x 2 pending  - urine culture 1/19- pending  stage 3 decubuti--will get wound care re consult  will get specialty bed  1/21/2023 reccs  by wound pt were ordered      HEME/ONC  # LUE VTE wi  -LUE venous duplex : occlusive thrombus in the left mid subclavian vein with partial slow flow detected in the lateral subclavian vein.  -LUE arterial Duplex neg   --change lovenox to eliquis 5 mg bid.     NEUROLOGY  # anoxic brain injury  # tonic clonic seizure  - had new flailing of the legs today which are concerning fro myoclonus vs seizure   - on kesrinivasan bhaktaote   " Dr. Loo. aware and ordered VEEG which now shows myoclonic seizure. will reach out to Dr. Loo as he is still seizure "    GASTROENTEROLOGY  # Shock liver.- resolved  due to PEA arrest    ENDOCRINOLOGY  - DM2   - A1c 8.8%  - on lantus/RISS    DERMATOLOGY  - Ustageable sacral pressure wound   - stage 3 sacral decub ulcer   - s/p debridement 12/19  - wound care  1/21/2023 stage 4 sacral  decubuti recommendations ordered    PLAN  - c/w current level of care  - panculture  - follow up with INFECTIOUS DISEASE--> remove midline ,   - follow up with NEUROLOGY over EEG and readjustment of seizure medication--> reccs are to increase to 750 bid level at 49   stage 4 decubiti sacrum wound pt reccs have been ordered   functional quad-- supportive care   midline removed - on 1/22/2023    Turn and position the  patient  frequently per  protocol to prevent decubiti ulcer,  skin breakdown and/or further skin breakdown.  Preventative measures       dvt ppx>>>on DOAC  fall, aspiration  precautions    Dipso: Patient has no insurance and Family in process of getting guardianship. Patient will then need placement.    updated son at bedside

## 2023-01-23 NOTE — PROGRESS NOTE ADULT - ASSESSMENT
REVIEW OF SYMPTOMS      Able to give (reliable) ROS  NO     PHYSICAL EXAM    HEENT Unremarkable  atraumatic   RESP Fair air entry EXP prolonged    Harsh breath sound Resp distres mild   CARDIAC S1 S2 No S3     NO JVD    ABDOMEN SOFT BS PRESENT NOT DISTENDED No hepatosplenomegaly   PEDAL EDEMA present No calf tenderness  NO rash       GENERAL DATA .   GOC.  12/11/2022 full code       ALLGY.  nka                            WT. ..  12/2/2022 86  BMI. ..    12/2/2022 29                          ICU STAY.  .. 11/29-12/9  COVID.   .. 12/2/2022 scv2 (-)   .. 11/20/2022 scv2 (-)   BEST PRACTICE ISSUES.    HOB ELEVATN. Yes  DVT PPLX.    .. 1/3/2023 apixa 5.2 (vte)  12/12 l Subclav throm  ALEJO PPLX.   ..      INFN PPLX.   .. 11/25 chlorhex .12%   .. 11/14 chlorhex 2%   SP SW ANTWAN.         DIET.    ..  12/15 glucerna 1.2 1440 gt   IV fl.  ..            PROCEDURES.  .. 12/19 debridement of sacral wound   .. 12/5/2022 trach    ABGS.  1/6/2023 ac 16/450/.3/5 746/49/123     VS/ PO/IO/ VENT/ DRIPS.  1/23/2023 afeb 68 100/50   1/22/2023 afeb 80 110/50     PATIENT PRESENTATION.  72 m doa 11/14/2022 cac    PMH  PMH CAD s/p PCI with stent 2015 and CABG 2014,   PMH  s/p medtronic PPM,   PMH CVA (lacunar infarct)    HOSPITAL COURSE   .. 1) PEA arrest with ROSC after approximately 5 min 11/14  Now communicative   .. 2) DVT 12/12 l Subclav thromS 12/15/2022 lvnx 80.2 1/3 changed to apixaban 5.2  .. 3) TRACH 12/5/2022 trach  .. 4) PEG12/5/2022 peg   .. 5) Cellulitis hand absc 12/13 mod enterococcus fecalis  staph epi 12/12-12/15/2022  cephalexin 12/15 vanco once  12/16-12/19 zosyn  .. 6) Vent weaning     PROBLEM ASSESSMENT RECOMMENDATIONS.  WEANING   .. wean as tolerated  .. 1/4/2022  If able to tolerate cpap 5/5 x 2 h will place on tc   .. 1/5/2023 DW Dr Valdovinos Tried on 35% tc   .. 1/6/2023 DW RT Pt to be on monitor during weaning trials and to be placed on vent at night and trach collar during daytime as tolerated   .. 1/7/2023 above reinforced to RT   .. 1/8/2023 placed on trach collar but has lot of secretns lasted 45 min   1/11/2023 tolerated tc several h  1/15/2023 Has been tolerating day time trach collar   TRACH LEAK  .. 1/9/2023 Notified by Hospitalist that pt is losing volunmes and that she is calling surgery to see if trach balloon has leak  HEMOPTYSIS  .. 1/13/2023 Pt having mild hemoptysis last 2 d   .. w 1/13-1/14-1/22/2023 w 10.4- 8.6 - 6.1   .. Hb 1/13-1/22/2023 Hb 9.7 - 9.3   .. Cr 1/13/2023 Cr .9   .. ct 1/13/2023 ct ch cw 1/4/2020 Stable bectasis and cystic changes basal l lower lobe and lingula Stable ssa r base   .. 1/13/2023 Hemoptysis likely sec to suctioning trauma in setting of full dose ac   .. 1/13/2023 VTE unlikely as cause of ac as pt is on fd ac   .. 1/13/2023 was eval by surgrey no trach bleed as dw Dr Arzate   .. 1/13/2023 infection is possibe cause sergio given bectasis so if persists will consider abio   .. 1/20/2023 dw son will try cpap overnight and escalate to ac if resp distress   .. 1/23/2023 dw rt will start 24 h trach collar Pt oin pulse ox and cardiac monitpor   INFECTION.  .. w 1/17-1/18-1/20-1/21/2023 w 8.3- 7.9 - 6.8 - 7.2    .. pr 1/17-1/20/2023 (-) - (-)   .. 1/16-1/20/2023 Rocephin started by hospitalist dced   CHF   .. 11/14/2022 echo mod decr segmental lvsf apical lateral apiccal ant segment are abn takotsubo cmpthy pasp 42 .  .. Monitor for chf has chr hfref per echo   COPD   .. 12/30 symbicort 160   .. 1/7/2023 glycopyrrolate 1.2   .. 1/9/2023 ipratropium  .. 1/15/2023 scopolamine   .. continue bd ics for copd   Anemia.  .. Hb 1/12-1/14-1/19-1/20-7/21/2023       Hb 9.8- 10 -9.1- 9.8  - 9.2   .. target hb 7 (+)  .. monitor   sp cac   .. good neuro recovery awake alert interactive   VTE  .. 1/3/2023 apixa 5.2 (vte)      OVERALL   WEAN as told   .. 1/20/2023 to try noct cpap and day time tc (which he has been tolerating  .. 1/23/2023 dw rt will start 24 h trach collar Pt oin pulse ox and cardiac monitpor   FLAILING OF LOWER EXTR started 1/16 on depakote   suggest Neuro followup 1/19/2023 myoclonic jerks improvd       TIME SPENT   Over 25 minutes aggregate care time spent on encounter; activities included   direct patient care, counseling and/or coordinating care reviewing notes, lab data/ imaging , discussion with multidisciplinary team/ patient  /family and explaining in detail risks, benefits, alternatives  of the recommendations     BRANDON CAMARILLO

## 2023-01-23 NOTE — PROGRESS NOTE ADULT - SUBJECTIVE AND OBJECTIVE BOX
Patient is a 72y old  Male who presents with a chief complaint of s/p cardiac arrest, hypoglycemia (19 Jan 2023 08:12)    INTERVAL HPI/OVERNIGHT EVENTS:   none  SUBJECTIVE: no complaints of pain/discomfort/dyspnea    MEDICATIONS  (STANDING):  albuterol/ipratropium for Nebulization 3 milliLiter(s) Nebulizer every 6 hours  apixaban 5 milliGRAM(s) Oral every 12 hours  aspirin  chewable 81 milliGRAM(s) Oral daily  atorvastatin 20 milliGRAM(s) Oral at bedtime  bacitracin   Ointment 1 Application(s) Topical two times a day  bacitracin   Ointment 1 Application(s) Topical two times a day  budesonide 160 MICROgram(s)/formoterol 4.5 MICROgram(s) Inhaler 2 Puff(s) Inhalation two times a day  chlorhexidine 0.12% Liquid 15 milliLiter(s) Oral Mucosa every 12 hours  chlorhexidine 2% Cloths 1 Application(s) Topical <User Schedule>  collagenase Ointment 1 Application(s) Topical two times a day  cyanocobalamin 1000 MICROGram(s) Oral daily  dextrose 5%. 1000 milliLiter(s) (50 mL/Hr) IV Continuous <Continuous>  dextrose 5%. 1000 milliLiter(s) (100 mL/Hr) IV Continuous <Continuous>  dextrose 50% Injectable 25 Gram(s) IV Push once  dextrose 50% Injectable 12.5 Gram(s) IV Push once  dextrose 50% Injectable 25 Gram(s) IV Push once  diphenhydrAMINE Injectable 50 milliGRAM(s) IV Push once  folic acid 1 milliGRAM(s) Oral daily  glucagon  Injectable 1 milliGRAM(s) IntraMuscular once  glycopyrrolate 1 milliGRAM(s) Oral two times a day  insulin glargine Injectable (LANTUS) 10 Unit(s) SubCutaneous at bedtime  insulin lispro (ADMELOG) corrective regimen sliding scale   SubCutaneous three times a day before meals  insulin lispro (ADMELOG) corrective regimen sliding scale   SubCutaneous at bedtime  levETIRAcetam 1500 milliGRAM(s) Oral two times a day  metoprolol tartrate 25 milliGRAM(s) Oral two times a day  polyethylene glycol 3350 17 Gram(s) Oral daily  saline laxative (FLEET) Rectal Enema 1 Enema Rectal once  scopolamine 1 mG/72 Hr(s) Patch 1 Patch Transdermal every 72 hours  senna 2 Tablet(s) Oral at bedtime  silver sulfADIAZINE 1% Cream 1 Application(s) Topical two times a day  valproic  acid Syrup 750 milliGRAM(s) Oral <User Schedule>  vitamin A &amp; D Ointment 1 Application(s) Topical two times a day    MEDICATIONS  (PRN):  acetaminophen     Tablet .. 650 milliGRAM(s) Oral every 6 hours PRN Temp greater or equal to 38C (100.4F), Mild Pain (1 - 3)  albuterol    90 MICROgram(s) HFA Inhaler 2 Puff(s) Inhalation every 6 hours PRN Shortness of Breath and/or Wheezing  aluminum hydroxide/magnesium hydroxide/simethicone Suspension 30 milliLiter(s) Oral every 4 hours PRN Dyspepsia  dextrose Oral Gel 15 Gram(s) Oral once PRN Blood Glucose LESS THAN 70 milliGRAM(s)/deciliter  oxycodone    5 mG/acetaminophen 325 mG 1 Tablet(s) Oral every 4 hours PRN Moderate Pain (4 - 6)    Allergies    No Known Allergies    Intolerances    Vital Signs Last 24 Hrs  T(C): 36.7 (23 Jan 2023 11:20), Max: 36.9 (22 Jan 2023 16:44)  T(F): 98.1 (23 Jan 2023 11:20), Max: 98.4 (22 Jan 2023 16:44)  HR: 69 (23 Jan 2023 11:20) (59 - 86)  BP: 133/77 (23 Jan 2023 11:20) (109/61 - 133/77)  BP(mean): --  RR: 16 (23 Jan 2023 05:24) (16 - 18)  SpO2: 100% (23 Jan 2023 11:20) (96% - 100%)    Parameters below as of 23 Jan 2023 11:44  Patient On (Oxygen Delivery Method): tracheostomy collar    PHYSICAL EXAM:  GENERAL: NAD, well-groomed, well-developed  HEAD:  Atraumatic, Normocephalic  EYES: EOMI, PERRLA, conjunctiva and sclera clear  ENMT: No tonsillar erythema, exudates, or enlargement; Moist mucous membranes, Good dentition, No lesions  NECK: Supple,   NERVOUS SYSTEM: sleeping    CHEST/LUNG: Coarse bs diffusely   HEART: Regular rate and rhythm; No murmurs, rubs, or gallops  ABDOMEN: Soft, Nontender, Nondistended; Bowel sounds present + peg  EXTREMITIES:  2+ Peripheral Pulses, No clubbing, cyanosis, or edema  SKIN: stage 4 sacral , skin tear to face where EEG leads        Labs                                                    9.3    6.13  )-----------( 275      ( 22 Jan 2023 06:42 )             29.8   01-22    141  |  105  |  28<H>  ----------------------------<  107<H>  4.2   |  30  |  0.84    Ca    9.0      22 Jan 2023 06:42  Phos  4.0     01-22  Mg     2.5     01-22              CAPILLARY BLOOD GLUCOSE    CAPILLARY BLOOD GLUCOSE      POCT Blood Glucose.: 121 mg/dL (22 Jan 2023 08:12)  POCT Blood Glucose.: 126 mg/dL (22 Jan 2023 06:39)  POCT Blood Glucose.: 151 mg/dL (22 Jan 2023 00:19)  POCT Blood Glucose.: 144 mg/dL (21 Jan 2023 21:05)  POCT Blood Glucose.: 90 mg/dL (21 Jan 2023 17:40)  POCT Blood Glucose.: 60 mg/dL (21 Jan 2023 16:08)  POCT Blood Glucose.: 59 mg/dL (21 Jan 2023 16:07)  POCT Blood Glucose.: 129 mg/dL (21 Jan 2023 12:07)

## 2023-01-24 LAB
GLUCOSE BLDC GLUCOMTR-MCNC: 121 MG/DL — HIGH (ref 70–99)
GLUCOSE BLDC GLUCOMTR-MCNC: 90 MG/DL — SIGNIFICANT CHANGE UP (ref 70–99)
GLUCOSE BLDC GLUCOMTR-MCNC: 97 MG/DL — SIGNIFICANT CHANGE UP (ref 70–99)

## 2023-01-24 PROCEDURE — 99232 SBSQ HOSP IP/OBS MODERATE 35: CPT

## 2023-01-24 RX ADMIN — Medication 1 APPLICATION(S): at 05:12

## 2023-01-24 RX ADMIN — SCOPALAMINE 1 PATCH: 1 PATCH, EXTENDED RELEASE TRANSDERMAL at 19:00

## 2023-01-24 RX ADMIN — PREGABALIN 1000 MICROGRAM(S): 225 CAPSULE ORAL at 11:14

## 2023-01-24 RX ADMIN — Medication 650 MILLIGRAM(S): at 07:35

## 2023-01-24 RX ADMIN — ROBINUL 1 MILLIGRAM(S): 0.2 INJECTION INTRAMUSCULAR; INTRAVENOUS at 05:15

## 2023-01-24 RX ADMIN — LEVETIRACETAM 1500 MILLIGRAM(S): 250 TABLET, FILM COATED ORAL at 05:10

## 2023-01-24 RX ADMIN — LEVETIRACETAM 1500 MILLIGRAM(S): 250 TABLET, FILM COATED ORAL at 18:51

## 2023-01-24 RX ADMIN — Medication 650 MILLIGRAM(S): at 05:09

## 2023-01-24 RX ADMIN — Medication 1 APPLICATION(S): at 18:02

## 2023-01-24 RX ADMIN — Medication 1 APPLICATION(S): at 11:01

## 2023-01-24 RX ADMIN — Medication 3 MILLILITER(S): at 05:52

## 2023-01-24 RX ADMIN — INSULIN GLARGINE 10 UNIT(S): 100 INJECTION, SOLUTION SUBCUTANEOUS at 21:45

## 2023-01-24 RX ADMIN — Medication 1 APPLICATION(S): at 17:58

## 2023-01-24 RX ADMIN — Medication 1 MILLIGRAM(S): at 11:13

## 2023-01-24 RX ADMIN — Medication 25 MILLIGRAM(S): at 05:15

## 2023-01-24 RX ADMIN — ROBINUL 1 MILLIGRAM(S): 0.2 INJECTION INTRAMUSCULAR; INTRAVENOUS at 18:52

## 2023-01-24 RX ADMIN — Medication 3 MILLILITER(S): at 11:17

## 2023-01-24 RX ADMIN — CHLORHEXIDINE GLUCONATE 15 MILLILITER(S): 213 SOLUTION TOPICAL at 05:09

## 2023-01-24 RX ADMIN — Medication 750 MILLIGRAM(S): at 08:24

## 2023-01-24 RX ADMIN — APIXABAN 5 MILLIGRAM(S): 2.5 TABLET, FILM COATED ORAL at 18:02

## 2023-01-24 RX ADMIN — Medication 1 APPLICATION(S): at 05:13

## 2023-01-24 RX ADMIN — Medication 1 APPLICATION(S): at 17:59

## 2023-01-24 RX ADMIN — APIXABAN 5 MILLIGRAM(S): 2.5 TABLET, FILM COATED ORAL at 05:10

## 2023-01-24 RX ADMIN — POLYETHYLENE GLYCOL 3350 17 GRAM(S): 17 POWDER, FOR SOLUTION ORAL at 11:14

## 2023-01-24 RX ADMIN — CHLORHEXIDINE GLUCONATE 1 APPLICATION(S): 213 SOLUTION TOPICAL at 05:11

## 2023-01-24 RX ADMIN — BUDESONIDE AND FORMOTEROL FUMARATE DIHYDRATE 2 PUFF(S): 160; 4.5 AEROSOL RESPIRATORY (INHALATION) at 05:52

## 2023-01-24 RX ADMIN — ATORVASTATIN CALCIUM 20 MILLIGRAM(S): 80 TABLET, FILM COATED ORAL at 21:41

## 2023-01-24 RX ADMIN — Medication 25 MILLIGRAM(S): at 18:52

## 2023-01-24 RX ADMIN — Medication 81 MILLIGRAM(S): at 11:14

## 2023-01-24 RX ADMIN — CHLORHEXIDINE GLUCONATE 15 MILLILITER(S): 213 SOLUTION TOPICAL at 17:59

## 2023-01-24 RX ADMIN — Medication 3 MILLILITER(S): at 01:01

## 2023-01-24 RX ADMIN — Medication 3 MILLILITER(S): at 17:27

## 2023-01-24 RX ADMIN — Medication 750 MILLIGRAM(S): at 21:40

## 2023-01-24 RX ADMIN — Medication 1 APPLICATION(S): at 18:05

## 2023-01-24 RX ADMIN — Medication 1 APPLICATION(S): at 05:15

## 2023-01-24 RX ADMIN — BUDESONIDE AND FORMOTEROL FUMARATE DIHYDRATE 2 PUFF(S): 160; 4.5 AEROSOL RESPIRATORY (INHALATION) at 17:27

## 2023-01-24 NOTE — PROGRESS NOTE ADULT - SUBJECTIVE AND OBJECTIVE BOX
BRANDON CAMARILLO    [facility] [address] [address]    Allergies    No Known Allergies    Intolerances        PAST MEDICAL & SURGICAL HISTORY:  HTN (hypertension)      HLD (hyperlipidemia)      Diabetes mellitus      Lacunar infarction      BPH (benign prostatic hyperplasia)      CHF (congestive heart failure)      Chronic obstructive pulmonary disease, unspecified COPD type      S/P CABG (coronary artery bypass graft)  2014          FAMILY HISTORY:      Home Medications:  aspirin 81 mg oral delayed release tablet: 1 tab(s) orally once a day (12 Jun 2020 17:35)  Liquifilm Tears preserved ophthalmic solution: 1 drop(s) to each affected eye 2 times a day (12 Jun 2020 17:35)      MEDICATIONS  (STANDING):  albuterol/ipratropium for Nebulization 3 milliLiter(s) Nebulizer every 6 hours  apixaban 5 milliGRAM(s) Oral every 12 hours  aspirin  chewable 81 milliGRAM(s) Oral daily  atorvastatin 20 milliGRAM(s) Oral at bedtime  bacitracin   Ointment 1 Application(s) Topical two times a day  bacitracin   Ointment 1 Application(s) Topical two times a day  budesonide 160 MICROgram(s)/formoterol 4.5 MICROgram(s) Inhaler 2 Puff(s) Inhalation two times a day  chlorhexidine 0.12% Liquid 15 milliLiter(s) Oral Mucosa every 12 hours  chlorhexidine 2% Cloths 1 Application(s) Topical <User Schedule>  collagenase Ointment 1 Application(s) Topical two times a day  cyanocobalamin 1000 MICROGram(s) Oral daily  dextrose 5%. 1000 milliLiter(s) (100 mL/Hr) IV Continuous <Continuous>  dextrose 5%. 1000 milliLiter(s) (50 mL/Hr) IV Continuous <Continuous>  dextrose 50% Injectable 25 Gram(s) IV Push once  dextrose 50% Injectable 12.5 Gram(s) IV Push once  dextrose 50% Injectable 25 Gram(s) IV Push once  diphenhydrAMINE Injectable 50 milliGRAM(s) IV Push once  folic acid 1 milliGRAM(s) Oral daily  glucagon  Injectable 1 milliGRAM(s) IntraMuscular once  glycopyrrolate 1 milliGRAM(s) Oral two times a day  insulin glargine Injectable (LANTUS) 10 Unit(s) SubCutaneous at bedtime  insulin lispro (ADMELOG) corrective regimen sliding scale   SubCutaneous three times a day before meals  insulin lispro (ADMELOG) corrective regimen sliding scale   SubCutaneous at bedtime  levETIRAcetam 1500 milliGRAM(s) Oral two times a day  metoprolol tartrate 25 milliGRAM(s) Oral two times a day  polyethylene glycol 3350 17 Gram(s) Oral daily  saline laxative (FLEET) Rectal Enema 1 Enema Rectal once  scopolamine 1 mG/72 Hr(s) Patch 1 Patch Transdermal every 72 hours  senna 2 Tablet(s) Oral at bedtime  silver sulfADIAZINE 1% Cream 1 Application(s) Topical two times a day  valproic  acid Syrup 750 milliGRAM(s) Oral <User Schedule>  vitamin A &amp; D Ointment 1 Application(s) Topical two times a day    MEDICATIONS  (PRN):  acetaminophen     Tablet .. 650 milliGRAM(s) Oral every 6 hours PRN Temp greater or equal to 38C (100.4F), Mild Pain (1 - 3)  albuterol    90 MICROgram(s) HFA Inhaler 2 Puff(s) Inhalation every 6 hours PRN Shortness of Breath and/or Wheezing  aluminum hydroxide/magnesium hydroxide/simethicone Suspension 30 milliLiter(s) Oral every 4 hours PRN Dyspepsia  dextrose Oral Gel 15 Gram(s) Oral once PRN Blood Glucose LESS THAN 70 milliGRAM(s)/deciliter      Diet, NPO with Tube Feed:   Tube Feeding Modality: Gastrostomy  Glucerna 1.2 Pedrito  Total Volume for 24 Hours (mL): 1440  Continuous  Until Goal Tube Feed Rate (mL per Hour): 60  Tube Feed Duration (in Hours): 24  Tube Feed Start Time: 14:30  Free Water Flush  Free Water Flush Instructions:  30 ml/hr via pump  Liquid Protein Supplement     Qty per Day:  1 (01-09-23 @ 13:45) [Active]          Vital Signs Last 24 Hrs  T(C): 36.7 (24 Jan 2023 04:42), Max: 36.9 (23 Jan 2023 23:34)  T(F): 98.1 (24 Jan 2023 04:42), Max: 98.4 (23 Jan 2023 23:34)  HR: 66 (24 Jan 2023 08:30) (60 - 73)  BP: 127/68 (24 Jan 2023 04:42) (101/54 - 133/77)  BP(mean): --  RR: 19 (24 Jan 2023 04:42) (15 - 19)  SpO2: 99% (24 Jan 2023 08:30) (95% - 100%)    Parameters below as of 24 Jan 2023 06:08  Patient On (Oxygen Delivery Method): tracheostomy collar          01-23-23 @ 07:01  -  01-24-23 @ 07:00  --------------------------------------------------------  IN: 1080 mL / OUT: 200 mL / NET: 880 mL        Mode: standby      LABS:                    WBC:  WBC Count: 6.13 K/uL (01-22 @ 06:42)  WBC Count: 7.23 K/uL (01-21 @ 07:20)      MICROBIOLOGY:  RECENT CULTURES:  01-19 Trach Asp Tracheal Aspirate Enterobacter cloacae complex   Few polymorphonuclear leukocytes per low power field  Numerous Squamous epithelial cells per low power field  Few Gram Negative Rods seen per oil power field  Few Gram Positive Cocci seen per oil power field  Rare Gram Positive Rods seen per oil power field   Numerous Enterobacter cloacae complex  Normal Respiratory Larissa absent    01-19 .Blood Blood-Peripheral XXXX XXXX   No growth to date.    01-19 .Blood Blood-Peripheral XXXX XXXX   No growth to date.                    Sodium:  Sodium, Serum: 141 mmol/L (01-22 @ 06:42)  Sodium, Serum: 143 mmol/L (01-21 @ 07:20)      0.84 mg/dL 01-22 @ 06:42  0.86 mg/dL 01-21 @ 07:20      Hemoglobin:  Hemoglobin: 9.3 g/dL (01-22 @ 06:42)  Hemoglobin: 9.2 g/dL (01-21 @ 07:20)      Platelets: Platelet Count - Automated: 275 K/uL (01-22 @ 06:42)  Platelet Count - Automated: 267 K/uL (01-21 @ 07:20)              RADIOLOGY & ADDITIONAL STUDIES:      MICROBIOLOGY:  RECENT CULTURES:  01-19 Trach Asp Tracheal Aspirate Enterobacter cloacae complex   Few polymorphonuclear leukocytes per low power field  Numerous Squamous epithelial cells per low power field  Few Gram Negative Rods seen per oil power field  Few Gram Positive Cocci seen per oil power field  Rare Gram Positive Rods seen per oil power field   Numerous Enterobacter cloacae complex  Normal Respiratory Larissa absent    01-19 .Blood Blood-Peripheral XXXX XXXX   No growth to date.    01-19 .Blood Blood-Peripheral XXXX XXXX   No growth to date.

## 2023-01-24 NOTE — PHYSICAL THERAPY INITIAL EVALUATION ADULT - PERTINENT HX OF CURRENT PROBLEM, REHAB EVAL
71 yo male w/ pmhx of COPD, CAD sp PCI w/ stent, CABG 2014, HF w/ PeF, 2nd degree heart block, sp PPM, HTN, DM, CKD Stage 3, CVA- lacunar infarcts admitted to ICU originally for cardiac arrest. ACLS for PEA arrest and ROSC returned after 5 minutes. Cardiac arrest possibly secondary to severe hypoglycemia from eating less and not tracking blood sugar carefully.

## 2023-01-24 NOTE — PROGRESS NOTE ADULT - SUBJECTIVE AND OBJECTIVE BOX
Patient is a 72y old  Male who presents with a chief complaint of s/p cardiac arrest, hypoglycemia (19 Jan 2023 08:12)    INTERVAL HPI/OVERNIGHT EVENTS:   none  SUBJECTIVE: no complaints of pain/discomfort/dyspnea      MEDICATIONS  (STANDING):  albuterol/ipratropium for Nebulization 3 milliLiter(s) Nebulizer every 6 hours  apixaban 5 milliGRAM(s) Oral every 12 hours  aspirin  chewable 81 milliGRAM(s) Oral daily  atorvastatin 20 milliGRAM(s) Oral at bedtime  bacitracin   Ointment 1 Application(s) Topical two times a day  bacitracin   Ointment 1 Application(s) Topical two times a day  budesonide 160 MICROgram(s)/formoterol 4.5 MICROgram(s) Inhaler 2 Puff(s) Inhalation two times a day  chlorhexidine 0.12% Liquid 15 milliLiter(s) Oral Mucosa every 12 hours  chlorhexidine 2% Cloths 1 Application(s) Topical <User Schedule>  collagenase Ointment 1 Application(s) Topical two times a day  cyanocobalamin 1000 MICROGram(s) Oral daily  dextrose 5%. 1000 milliLiter(s) (50 mL/Hr) IV Continuous <Continuous>  dextrose 5%. 1000 milliLiter(s) (100 mL/Hr) IV Continuous <Continuous>  dextrose 50% Injectable 25 Gram(s) IV Push once  dextrose 50% Injectable 12.5 Gram(s) IV Push once  dextrose 50% Injectable 25 Gram(s) IV Push once  diphenhydrAMINE Injectable 50 milliGRAM(s) IV Push once  folic acid 1 milliGRAM(s) Oral daily  glucagon  Injectable 1 milliGRAM(s) IntraMuscular once  glycopyrrolate 1 milliGRAM(s) Oral two times a day  insulin glargine Injectable (LANTUS) 10 Unit(s) SubCutaneous at bedtime  insulin lispro (ADMELOG) corrective regimen sliding scale   SubCutaneous three times a day before meals  insulin lispro (ADMELOG) corrective regimen sliding scale   SubCutaneous at bedtime  levETIRAcetam 1500 milliGRAM(s) Oral two times a day  metoprolol tartrate 25 milliGRAM(s) Oral two times a day  polyethylene glycol 3350 17 Gram(s) Oral daily  saline laxative (FLEET) Rectal Enema 1 Enema Rectal once  scopolamine 1 mG/72 Hr(s) Patch 1 Patch Transdermal every 72 hours  senna 2 Tablet(s) Oral at bedtime  silver sulfADIAZINE 1% Cream 1 Application(s) Topical two times a day  valproic  acid Syrup 750 milliGRAM(s) Oral <User Schedule>  vitamin A &amp; D Ointment 1 Application(s) Topical two times a day    MEDICATIONS  (PRN):  acetaminophen     Tablet .. 650 milliGRAM(s) Oral every 6 hours PRN Temp greater or equal to 38C (100.4F), Mild Pain (1 - 3)  albuterol    90 MICROgram(s) HFA Inhaler 2 Puff(s) Inhalation every 6 hours PRN Shortness of Breath and/or Wheezing  aluminum hydroxide/magnesium hydroxide/simethicone Suspension 30 milliLiter(s) Oral every 4 hours PRN Dyspepsia  dextrose Oral Gel 15 Gram(s) Oral once PRN Blood Glucose LESS THAN 70 milliGRAM(s)/deciliter    Allergies    No Known Allergies    Intolerances    Vital Signs Last 24 Hrs  T(C): 36.7 (24 Jan 2023 04:42), Max: 36.9 (23 Jan 2023 23:34)  T(F): 98.1 (24 Jan 2023 04:42), Max: 98.4 (23 Jan 2023 23:34)  HR: 66 (24 Jan 2023 08:30) (60 - 73)  BP: 127/68 (24 Jan 2023 04:42) (101/54 - 133/77)  BP(mean): --  RR: 19 (24 Jan 2023 04:42) (15 - 19)  SpO2: 99% (24 Jan 2023 08:30) (95% - 100%)    Parameters below as of 24 Jan 2023 06:08  Patient On (Oxygen Delivery Method): tracheostomy collar        PHYSICAL EXAM:  GENERAL: NAD, well-groomed, well-developed  HEAD:  Atraumatic, Normocephalic  EYES: EOMI, PERRLA, conjunctiva and sclera clear  ENMT: No tonsillar erythema, exudates, or enlargement; Moist mucous membranes, Good dentition, No lesions  NECK: Supple,   NERVOUS SYSTEM: sleeping    CHEST/LUNG: Coarse bs diffusely   HEART: Regular rate and rhythm; No murmurs, rubs, or gallops  ABDOMEN: Soft, Nontender, Nondistended; Bowel sounds present + peg  EXTREMITIES:  2+ Peripheral Pulses, No clubbing, cyanosis, or edema  SKIN: stage 4 sacral , skin tear to face where EEG leads        Labs                                  CAPILLARY BLOOD GLUCOSE  CAPILLARY BLOOD GLUCOSE      POCT Blood Glucose.: 121 mg/dL (24 Jan 2023 07:57)  POCT Blood Glucose.: 134 mg/dL (23 Jan 2023 21:05)  POCT Blood Glucose.: 140 mg/dL (23 Jan 2023 17:28)  POCT Blood Glucose.: 134 mg/dL (23 Jan 2023 11:42)

## 2023-01-24 NOTE — PROGRESS NOTE ADULT - ASSESSMENT
Status post incision and drainage of the left hand.  Change at this juncture appear to be mostly related to the DVT more than anything else I expect.  Unclear if the hematoma drained was secondarily infected, or not.  Culture is pending.  No leukocytosis.  He has had intermittent low-grade fevers through the beginning of this month, with higher grade fevers before that.    12/14: low grade temp last night and today around 16:00, no leukocytosis, left hand s/p I&D - culture is pending, cefazolin IV continued.   12/15: no fevers today thus far, no wbc, hand wound culture grew enterococcus, changing abx to IV vancomycin. Pt's wound with no pus, no malodor, seems not tender, no significant swelling or erythema of surrounding tissues, normal temperature. Surveillance BCs x 2 were collected.   12/16: low grade temp last night, no leukocytosis, cr ok, hand wound continues to improve. Pt's back was assessed with wound care / PT, possibly will need to be debrided, recommended vascular consult. Will add Zosyn to the regimen for now, Vancomycin IV continued, awaiting for BCs.   12/19: no longer spiking fevers, no WBC, BCs with no growth to date, now s/p sacral wound debridement, will stop abx.   12/20: no fevers, no leukocytosis, BCs with no growth to date, s/p debridement of sacral wound yesterday, off abx.     1/20/2023: Patient with fever intermittently for 2 days, low-grade, max 101 °F with IV catheter in place over 1 month.  This should be removed.  No blood culture data obtained.  No fever however today.  White blood cell count is normal, respiratory tract is stable.  The role of ceftriaxone here is not clear at all to me.  Clearly has not made an impact on the patient's bronchorrhea, and likely serves only to select resistant neal at this point in time.  No urine data obtained.  Note repeat chest imaging obtained.    1/24: no longer having fevers, no new lab work, on tracheostomy collar, no evidence of uncontrolled infection, no role for abx at this time, discussed with pt's son by the bedside.     Suggestions  If the pt will have recurrent fever, blood cultures x2, urinalysis, urine culture, repeat chest x-ray  continue monitoring off abx    will sign off, re-consult as needed    Discussed with Dr. Chambers  Discussed with pt's son

## 2023-01-24 NOTE — PHYSICAL THERAPY INITIAL EVALUATION ADULT - PRECAUTIONS/LIMITATIONS, REHAB EVAL
trach collar/aspiration precautions/cardiac precautions/fall precautions/oxygen therapy device and L/min/seizure precautions

## 2023-01-24 NOTE — PROGRESS NOTE ADULT - SUBJECTIVE AND OBJECTIVE BOX
BRANDON CAMARILLO  MRN-[id number]    Follow Up:  fevers, sacral wound    Interval History: the pt was seen and examined earlier, no distress, on tracheostomy collar and comfortable, pt's son is present. The pt is afebrile, no new lab work.     PAST MEDICAL & SURGICAL HISTORY:  HTN (hypertension)      HLD (hyperlipidemia)      Diabetes mellitus      Lacunar infarction      BPH (benign prostatic hyperplasia)      CHF (congestive heart failure)      Chronic obstructive pulmonary disease, unspecified COPD type      S/P CABG (coronary artery bypass graft)  2014          ROS:    [x ] Unobtainable because: tracheostomy   [ ] All other systems negative    Constitutional: no fever, no chills  Head: no trauma  Eyes: no vision changes, no eye pain  ENT:  no sore throat, no rhinorrhea  Cardiovascular:  no chest pain, no palpitation  Respiratory:  no SOB, no cough  GI:  no abd pain, no vomiting, no diarrhea  urinary: no dysuria, no hematuria, no flank pain  musculoskeletal:  no joint pain, no joint swelling  skin:  no rash  neurology:  no headache, no seizure, no change in mental status  psych: no anxiety, no depression         Allergies  No Known Allergies        ANTIMICROBIALS:      OTHER MEDS:  acetaminophen     Tablet .. 650 milliGRAM(s) Oral every 6 hours PRN  albuterol    90 MICROgram(s) HFA Inhaler 2 Puff(s) Inhalation every 6 hours PRN  albuterol/ipratropium for Nebulization 3 milliLiter(s) Nebulizer every 6 hours  aluminum hydroxide/magnesium hydroxide/simethicone Suspension 30 milliLiter(s) Oral every 4 hours PRN  apixaban 5 milliGRAM(s) Oral every 12 hours  aspirin  chewable 81 milliGRAM(s) Oral daily  atorvastatin 20 milliGRAM(s) Oral at bedtime  bacitracin   Ointment 1 Application(s) Topical two times a day  bacitracin   Ointment 1 Application(s) Topical two times a day  budesonide 160 MICROgram(s)/formoterol 4.5 MICROgram(s) Inhaler 2 Puff(s) Inhalation two times a day  chlorhexidine 0.12% Liquid 15 milliLiter(s) Oral Mucosa every 12 hours  chlorhexidine 2% Cloths 1 Application(s) Topical <User Schedule>  collagenase Ointment 1 Application(s) Topical two times a day  cyanocobalamin 1000 MICROGram(s) Oral daily  dextrose 5%. 1000 milliLiter(s) IV Continuous <Continuous>  dextrose 5%. 1000 milliLiter(s) IV Continuous <Continuous>  dextrose 50% Injectable 25 Gram(s) IV Push once  dextrose 50% Injectable 12.5 Gram(s) IV Push once  dextrose 50% Injectable 25 Gram(s) IV Push once  dextrose Oral Gel 15 Gram(s) Oral once PRN  diphenhydrAMINE Injectable 50 milliGRAM(s) IV Push once  folic acid 1 milliGRAM(s) Oral daily  glucagon  Injectable 1 milliGRAM(s) IntraMuscular once  glycopyrrolate 1 milliGRAM(s) Oral two times a day  insulin glargine Injectable (LANTUS) 10 Unit(s) SubCutaneous at bedtime  insulin lispro (ADMELOG) corrective regimen sliding scale   SubCutaneous three times a day before meals  insulin lispro (ADMELOG) corrective regimen sliding scale   SubCutaneous at bedtime  levETIRAcetam 1500 milliGRAM(s) Oral two times a day  metoprolol tartrate 25 milliGRAM(s) Oral two times a day  polyethylene glycol 3350 17 Gram(s) Oral daily  saline laxative (FLEET) Rectal Enema 1 Enema Rectal once  scopolamine 1 mG/72 Hr(s) Patch 1 Patch Transdermal every 72 hours  senna 2 Tablet(s) Oral at bedtime  silver sulfADIAZINE 1% Cream 1 Application(s) Topical two times a day  valproic  acid Syrup 750 milliGRAM(s) Oral <User Schedule>  vitamin A &amp; D Ointment 1 Application(s) Topical two times a day      Vital Signs Last 24 Hrs  T(C): 36.5 (24 Jan 2023 11:07), Max: 36.9 (23 Jan 2023 23:34)  T(F): 97.7 (24 Jan 2023 11:07), Max: 98.4 (23 Jan 2023 23:34)  HR: 68 (24 Jan 2023 15:48) (60 - 73)  BP: 113/67 (24 Jan 2023 15:48) (101/54 - 127/68)  BP(mean): --  RR: 18 (24 Jan 2023 11:07) (15 - 19)  SpO2: 96% (24 Jan 2023 15:48) (95% - 100%)    Parameters below as of 24 Jan 2023 15:48  Patient On (Oxygen Delivery Method): tracheostomy collar  O2 Flow (L/min): 35      Physical Exam:  General: Nontoxic-appearing Male in no acute distress.  HEENT: AT/NC Anicteric. Conjunctiva pink and moist. Oropharynx/dentition unable secondary to patient compliance.   Neck: Not rigid. No sense of mass. TRach c/d/i. Clear to white secretions.   Nodes: None palpable.  Lungs: mild rhonchi bilaterally   Heart: Regular rate and rhythm. No Murmur. No rub. No gallop. No palpable thrill.  Abdomen: Bowel sounds present and normoactive. Soft. Nondistended. Nontender. No sense of mass. No organomegaly. GT - site with mild tissue irritation, not infected looking.  Back: No spinal tenderness. No costovertebral angle tenderness. Sacral sore 10x8cm, grade 3, not infected appearing.   Extremities: No cyanosis or clubbing. 1+ edema.  Line (midline?) c/d/1  Skin: Warm. Dry. Good turgor. No rash. No vasculitic stigmata.  Psychiatric: awake and alert, follows some commands, calm     WBC Count: 6.13 K/uL (01-22 @ 06:42)  WBC Count: 7.23 K/uL (01-21 @ 07:20)  WBC Count: 6.86 K/uL (01-20 @ 07:50)  WBC Count: 7.99 K/uL (01-19 @ 07:42)  WBC Count: 7.98 K/uL (01-18 @ 07:28)    Creatinine Trend: 0.84<--, 0.86<--, 0.96<--, 0.99<--, 1.12<--, 0.82<--      MICROBIOLOGY:  v  Trach Asp Tracheal Aspirate  01-19-23   Numerous Enterobacter cloacae complex  Normal Respiratory Larissa absent  --  Enterobacter cloacae complex      .Blood Blood-Peripheral  01-19-23   No growth to date.  --  --      .Blood Blood-Peripheral  01-19-23   No growth to date.  --  --      Trach Asp Tracheal Aspirate  01-13-23   Moderate Citrobacter koseri  Normal Respiratory Larissa present  --  Citrobacter koseri      .Blood Blood  01-13-23   No Growth Final  --  --    Procalcitonin, Serum: 0.06 (01-20-23 @ 07:50)  Procalcitonin, Serum: 0.07 (01-19-23 @ 15:05)      SARS-CoV-2: NotDetec (03 Dec 2022 09:45)  SARS-CoV-2: NotDetec (02 Dec 2022 01:55)  SARS-CoV-2: NotDetec (30 Nov 2022 02:30)  SARS-CoV-2: NotDetec (20 Nov 2022 09:10)    RADIOLOGY:

## 2023-01-24 NOTE — PROGRESS NOTE ADULT - ASSESSMENT
REVIEW OF SYMPTOMS      Able to give (reliable) ROS  NO     PHYSICAL EXAM    HEENT Unremarkable  atraumatic   RESP Fair air entry EXP prolonged    Harsh breath sound Resp distres mild   CARDIAC S1 S2 No S3     NO JVD    ABDOMEN SOFT BS PRESENT NOT DISTENDED No hepatosplenomegaly   PEDAL EDEMA present No calf tenderness  NO rash       GENERAL DATA .   GOC.  12/11/2022 full code       ALLGY.  nka                            WT. ..  12/2/2022 86  BMI. ..    12/2/2022 29                          ICU STAY.  .. 11/29-12/9  COVID.   .. 12/2/2022 scv2 (-)   .. 11/20/2022 scv2 (-)   BEST PRACTICE ISSUES.    HOB ELEVATN. Yes  DVT PPLX.    .. 1/3/2023 apixa 5.2 (vte)  12/12 l Subclav throm  ALEJO PPLX.   ..      INFN PPLX.   .. 11/25 chlorhex .12%   .. 11/14 chlorhex 2%   SP SW ANTWAN.         DIET.    ..  12/15 glucerna 1.2 1440 gt   IV fl.  ..            PROCEDURES.  .. 12/19 debridement of sacral wound   .. 12/5/2022 trach    ABGS.  1/6/2023 ac 16/450/.3/5 746/49/123     VS/ PO/IO/ VENT/ DRIPS.  1/24/2023 afeb 68 110/60   1/24/2023 35% tc 96%     PATIENT PRESENTATION.  72 m doa 11/14/2022 cac    PMH  PMH CAD s/p PCI with stent 2015 and CABG 2014,   PMH  s/p medtronic PPM,   PMH CVA (lacunar infarct)    HOSPITAL COURSE   .. 1) PEA arrest with ROSC after approximately 5 min 11/14  Now communicative   .. 2) DVT 12/12 l Subclav thromS 12/15/2022 lvnx 80.2 1/3 changed to apixaban 5.2  .. 3) TRACH 12/5/2022 trach  .. 4) PEG12/5/2022 peg   .. 5) Cellulitis hand absc 12/13 mod enterococcus fecalis  staph epi 12/12-12/15/2022  cephalexin 12/15 vanco once  12/16-12/19 zosyn  .. 6) Vent weaning     PROBLEM ASSESSMENT RECOMMENDATIONS.  WEANING   .. wean as tolerated  .. 1/4/2022  If able to tolerate cpap 5/5 x 2 h will place on tc   .. 1/5/2023 DW Dr Valdovinos Tried on 35% tc   .. 1/6/2023 DW RT Pt to be on monitor during weaning trials and to be placed on vent at night and trach collar during daytime as tolerated   .. 1/7/2023 above reinforced to RT   .. 1/8/2023 placed on trach collar but has lot of secretns lasted 45 min   1/11/2023 tolerated tc several h  1/15/2023 Has been tolerating day time trach collar   TRACH LEAK  .. 1/9/2023 Notified by Hospitalist that pt is losing volunmes and that she is calling surgery to see if trach balloon has leak  HEMOPTYSIS  .. 1/13/2023 Pt having mild hemoptysis last 2 d   .. w 1/13-1/14-1/22/2023 w 10.4- 8.6 - 6.1   .. Hb 1/13-1/22/2023 Hb 9.7 - 9.3   .. Cr 1/13/2023 Cr .9   .. ct 1/13/2023 ct ch cw 1/4/2020 Stable bectasis and cystic changes basal l lower lobe and lingula Stable ssa r base   .. 1/13/2023 Hemoptysis likely sec to suctioning trauma in setting of full dose ac   .. 1/13/2023 VTE unlikely as cause of ac as pt is on fd ac   .. 1/13/2023 was eval by surgrey no trach bleed as dw Dr Arzate   .. 1/13/2023 infection is possibe cause sergio given bectasis so if persists will consider abio   .. 1/20/2023 dw son will try cpap overnight and escalate to ac if resp distress   .. 1/23/2023 dw rt will start 24 h trach collar Pt oin pulse ox and cardiac monitpor   .. 1/24/2023 tolerating 24 h trach collar   INFECTION.  .. w 1/17-1/18-1/20-1/21/2023 w 8.3- 7.9 - 6.8 - 7.2    .. pr 1/17-1/20/2023 (-) - (-)   .. 1/16-1/20/2023 Rocephin started by hospitalist luke   CHF   .. 11/14/2022 echo mod decr segmental lvsf apical lateral apiccal ant segment are abn takotsubo cmpthy pasp 42 .  .. Monitor for chf has chr hfref per echo   COPD   .. 12/30 symbicort 160   .. 1/7/2023 glycopyrrolate 1.2   .. 1/9/2023 ipratropium  .. 1/15/2023 scopolamine   .. continue bd ics for copd   Anemia.  .. Hb 1/12-1/14-1/19-1/20-7/21/2023       Hb 9.8- 10 -9.1- 9.8  - 9.2   .. target hb 7 (+)  .. monitor   sp cac   .. good neuro recovery awake alert interactive   VTE  .. 1/3/2023 apixa 5.2 (vte)      OVERALL   WEAN as told   .. 1/20/2023 to try noct cpap and day time tc (which he has been tolerating  .. 1/23/2023 dw rt will start 24 h trach collar Pt oin pulse ox and cardiac monitpor   FLAILING OF LOWER EXTR started 1/16 on depakote   suggest Neuro followup 1/19/2023 myoclonic jerks improvd       TIME SPENT   Over 25 minutes aggregate care time spent on encounter; activities included   direct patient care, counseling and/or coordinating care reviewing notes, lab data/ imaging , discussion with multidisciplinary team/ patient  /family and explaining in detail risks, benefits, alternatives  of the recommendations     BRANDON CAMARILLO

## 2023-01-25 LAB
ANION GAP SERPL CALC-SCNC: 5 MMOL/L — SIGNIFICANT CHANGE UP (ref 5–17)
BUN SERPL-MCNC: 24 MG/DL — HIGH (ref 7–23)
CALCIUM SERPL-MCNC: 8.6 MG/DL — SIGNIFICANT CHANGE UP (ref 8.5–10.1)
CHLORIDE SERPL-SCNC: 103 MMOL/L — SIGNIFICANT CHANGE UP (ref 96–108)
CO2 SERPL-SCNC: 31 MMOL/L — SIGNIFICANT CHANGE UP (ref 22–31)
CREAT SERPL-MCNC: 0.72 MG/DL — SIGNIFICANT CHANGE UP (ref 0.5–1.3)
CULTURE RESULTS: SIGNIFICANT CHANGE UP
CULTURE RESULTS: SIGNIFICANT CHANGE UP
EGFR: 96 ML/MIN/1.73M2 — SIGNIFICANT CHANGE UP
GLUCOSE BLDC GLUCOMTR-MCNC: 134 MG/DL — HIGH (ref 70–99)
GLUCOSE BLDC GLUCOMTR-MCNC: 135 MG/DL — HIGH (ref 70–99)
GLUCOSE BLDC GLUCOMTR-MCNC: 143 MG/DL — HIGH (ref 70–99)
GLUCOSE BLDC GLUCOMTR-MCNC: 144 MG/DL — HIGH (ref 70–99)
GLUCOSE SERPL-MCNC: 128 MG/DL — HIGH (ref 70–99)
HCT VFR BLD CALC: 30.3 % — LOW (ref 39–50)
HGB BLD-MCNC: 9.5 G/DL — LOW (ref 13–17)
MAGNESIUM SERPL-MCNC: 2.1 MG/DL — SIGNIFICANT CHANGE UP (ref 1.6–2.6)
MCHC RBC-ENTMCNC: 30.6 PG — SIGNIFICANT CHANGE UP (ref 27–34)
MCHC RBC-ENTMCNC: 31.4 G/DL — LOW (ref 32–36)
MCV RBC AUTO: 97.7 FL — SIGNIFICANT CHANGE UP (ref 80–100)
NRBC # BLD: 0 /100 WBCS — SIGNIFICANT CHANGE UP (ref 0–0)
PHOSPHATE SERPL-MCNC: 3.4 MG/DL — SIGNIFICANT CHANGE UP (ref 2.5–4.5)
PLATELET # BLD AUTO: 258 K/UL — SIGNIFICANT CHANGE UP (ref 150–400)
POTASSIUM SERPL-MCNC: 4.3 MMOL/L — SIGNIFICANT CHANGE UP (ref 3.5–5.3)
POTASSIUM SERPL-SCNC: 4.3 MMOL/L — SIGNIFICANT CHANGE UP (ref 3.5–5.3)
RBC # BLD: 3.1 M/UL — LOW (ref 4.2–5.8)
RBC # FLD: 14.3 % — SIGNIFICANT CHANGE UP (ref 10.3–14.5)
SODIUM SERPL-SCNC: 139 MMOL/L — SIGNIFICANT CHANGE UP (ref 135–145)
SPECIMEN SOURCE: SIGNIFICANT CHANGE UP
SPECIMEN SOURCE: SIGNIFICANT CHANGE UP
WBC # BLD: 6.75 K/UL — SIGNIFICANT CHANGE UP (ref 3.8–10.5)
WBC # FLD AUTO: 6.75 K/UL — SIGNIFICANT CHANGE UP (ref 3.8–10.5)

## 2023-01-25 PROCEDURE — 99233 SBSQ HOSP IP/OBS HIGH 50: CPT

## 2023-01-25 PROCEDURE — 99232 SBSQ HOSP IP/OBS MODERATE 35: CPT

## 2023-01-25 RX ADMIN — LEVETIRACETAM 1500 MILLIGRAM(S): 250 TABLET, FILM COATED ORAL at 05:11

## 2023-01-25 RX ADMIN — BUDESONIDE AND FORMOTEROL FUMARATE DIHYDRATE 2 PUFF(S): 160; 4.5 AEROSOL RESPIRATORY (INHALATION) at 05:15

## 2023-01-25 RX ADMIN — Medication 750 MILLIGRAM(S): at 21:37

## 2023-01-25 RX ADMIN — Medication 1 APPLICATION(S): at 17:47

## 2023-01-25 RX ADMIN — SCOPALAMINE 1 PATCH: 1 PATCH, EXTENDED RELEASE TRANSDERMAL at 00:01

## 2023-01-25 RX ADMIN — Medication 1 APPLICATION(S): at 05:23

## 2023-01-25 RX ADMIN — ROBINUL 1 MILLIGRAM(S): 0.2 INJECTION INTRAMUSCULAR; INTRAVENOUS at 05:12

## 2023-01-25 RX ADMIN — ATORVASTATIN CALCIUM 20 MILLIGRAM(S): 80 TABLET, FILM COATED ORAL at 21:37

## 2023-01-25 RX ADMIN — Medication 1 APPLICATION(S): at 05:13

## 2023-01-25 RX ADMIN — SCOPALAMINE 1 PATCH: 1 PATCH, EXTENDED RELEASE TRANSDERMAL at 00:00

## 2023-01-25 RX ADMIN — Medication 3 MILLILITER(S): at 00:08

## 2023-01-25 RX ADMIN — ROBINUL 1 MILLIGRAM(S): 0.2 INJECTION INTRAMUSCULAR; INTRAVENOUS at 17:45

## 2023-01-25 RX ADMIN — Medication 1 APPLICATION(S): at 05:10

## 2023-01-25 RX ADMIN — CHLORHEXIDINE GLUCONATE 15 MILLILITER(S): 213 SOLUTION TOPICAL at 05:11

## 2023-01-25 RX ADMIN — PREGABALIN 1000 MICROGRAM(S): 225 CAPSULE ORAL at 12:15

## 2023-01-25 RX ADMIN — Medication 3 MILLILITER(S): at 05:14

## 2023-01-25 RX ADMIN — APIXABAN 5 MILLIGRAM(S): 2.5 TABLET, FILM COATED ORAL at 05:12

## 2023-01-25 RX ADMIN — Medication 25 MILLIGRAM(S): at 05:17

## 2023-01-25 RX ADMIN — APIXABAN 5 MILLIGRAM(S): 2.5 TABLET, FILM COATED ORAL at 17:45

## 2023-01-25 RX ADMIN — Medication 1 APPLICATION(S): at 17:46

## 2023-01-25 RX ADMIN — Medication 3 MILLILITER(S): at 11:02

## 2023-01-25 RX ADMIN — CHLORHEXIDINE GLUCONATE 15 MILLILITER(S): 213 SOLUTION TOPICAL at 17:44

## 2023-01-25 RX ADMIN — Medication 3 MILLILITER(S): at 23:08

## 2023-01-25 RX ADMIN — POLYETHYLENE GLYCOL 3350 17 GRAM(S): 17 POWDER, FOR SOLUTION ORAL at 12:16

## 2023-01-25 RX ADMIN — BUDESONIDE AND FORMOTEROL FUMARATE DIHYDRATE 2 PUFF(S): 160; 4.5 AEROSOL RESPIRATORY (INHALATION) at 17:19

## 2023-01-25 RX ADMIN — SENNA PLUS 2 TABLET(S): 8.6 TABLET ORAL at 21:37

## 2023-01-25 RX ADMIN — Medication 81 MILLIGRAM(S): at 12:15

## 2023-01-25 RX ADMIN — Medication 1 MILLIGRAM(S): at 12:15

## 2023-01-25 RX ADMIN — SCOPALAMINE 1 PATCH: 1 PATCH, EXTENDED RELEASE TRANSDERMAL at 08:05

## 2023-01-25 RX ADMIN — Medication 3 MILLILITER(S): at 17:17

## 2023-01-25 RX ADMIN — CHLORHEXIDINE GLUCONATE 1 APPLICATION(S): 213 SOLUTION TOPICAL at 05:12

## 2023-01-25 RX ADMIN — Medication 750 MILLIGRAM(S): at 08:28

## 2023-01-25 RX ADMIN — LEVETIRACETAM 1500 MILLIGRAM(S): 250 TABLET, FILM COATED ORAL at 17:46

## 2023-01-25 RX ADMIN — INSULIN GLARGINE 10 UNIT(S): 100 INJECTION, SOLUTION SUBCUTANEOUS at 21:38

## 2023-01-25 RX ADMIN — SCOPALAMINE 1 PATCH: 1 PATCH, EXTENDED RELEASE TRANSDERMAL at 19:00

## 2023-01-25 RX ADMIN — Medication 1 APPLICATION(S): at 17:45

## 2023-01-25 NOTE — PROGRESS NOTE ADULT - SUBJECTIVE AND OBJECTIVE BOX
BRANDON CAMARILLO    [facility] [address]    Allergies    No Known Allergies    Intolerances        PAST MEDICAL & SURGICAL HISTORY:  HTN (hypertension)      HLD (hyperlipidemia)      Diabetes mellitus      Lacunar infarction      BPH (benign prostatic hyperplasia)      CHF (congestive heart failure)      Chronic obstructive pulmonary disease, unspecified COPD type      S/P CABG (coronary artery bypass graft)  2014          FAMILY HISTORY:      Home Medications:  aspirin 81 mg oral delayed release tablet: 1 tab(s) orally once a day (12 Jun 2020 17:35)  Liquifilm Tears preserved ophthalmic solution: 1 drop(s) to each affected eye 2 times a day (12 Jun 2020 17:35)      MEDICATIONS  (STANDING):  albuterol/ipratropium for Nebulization 3 milliLiter(s) Nebulizer every 6 hours  apixaban 5 milliGRAM(s) Oral every 12 hours  aspirin  chewable 81 milliGRAM(s) Oral daily  atorvastatin 20 milliGRAM(s) Oral at bedtime  bacitracin   Ointment 1 Application(s) Topical two times a day  bacitracin   Ointment 1 Application(s) Topical two times a day  budesonide 160 MICROgram(s)/formoterol 4.5 MICROgram(s) Inhaler 2 Puff(s) Inhalation two times a day  chlorhexidine 0.12% Liquid 15 milliLiter(s) Oral Mucosa every 12 hours  chlorhexidine 2% Cloths 1 Application(s) Topical <User Schedule>  collagenase Ointment 1 Application(s) Topical two times a day  cyanocobalamin 1000 MICROGram(s) Oral daily  dextrose 5%. 1000 milliLiter(s) (100 mL/Hr) IV Continuous <Continuous>  dextrose 5%. 1000 milliLiter(s) (50 mL/Hr) IV Continuous <Continuous>  dextrose 50% Injectable 25 Gram(s) IV Push once  dextrose 50% Injectable 12.5 Gram(s) IV Push once  dextrose 50% Injectable 25 Gram(s) IV Push once  diphenhydrAMINE Injectable 50 milliGRAM(s) IV Push once  folic acid 1 milliGRAM(s) Oral daily  glucagon  Injectable 1 milliGRAM(s) IntraMuscular once  glycopyrrolate 1 milliGRAM(s) Oral two times a day  insulin glargine Injectable (LANTUS) 10 Unit(s) SubCutaneous at bedtime  insulin lispro (ADMELOG) corrective regimen sliding scale   SubCutaneous three times a day before meals  insulin lispro (ADMELOG) corrective regimen sliding scale   SubCutaneous at bedtime  levETIRAcetam 1500 milliGRAM(s) Oral two times a day  metoprolol tartrate 25 milliGRAM(s) Oral two times a day  polyethylene glycol 3350 17 Gram(s) Oral daily  saline laxative (FLEET) Rectal Enema 1 Enema Rectal once  scopolamine 1 mG/72 Hr(s) Patch 1 Patch Transdermal every 72 hours  senna 2 Tablet(s) Oral at bedtime  silver sulfADIAZINE 1% Cream 1 Application(s) Topical two times a day  valproic  acid Syrup 750 milliGRAM(s) Oral <User Schedule>  vitamin A &amp; D Ointment 1 Application(s) Topical two times a day    MEDICATIONS  (PRN):  acetaminophen     Tablet .. 650 milliGRAM(s) Oral every 6 hours PRN Temp greater or equal to 38C (100.4F), Mild Pain (1 - 3)  albuterol    90 MICROgram(s) HFA Inhaler 2 Puff(s) Inhalation every 6 hours PRN Shortness of Breath and/or Wheezing  aluminum hydroxide/magnesium hydroxide/simethicone Suspension 30 milliLiter(s) Oral every 4 hours PRN Dyspepsia  dextrose Oral Gel 15 Gram(s) Oral once PRN Blood Glucose LESS THAN 70 milliGRAM(s)/deciliter      Diet, NPO with Tube Feed:   Tube Feeding Modality: Gastrostomy  Glucerna 1.2 Pedrito  Total Volume for 24 Hours (mL): 1440  Continuous  Until Goal Tube Feed Rate (mL per Hour): 60  Tube Feed Duration (in Hours): 24  Tube Feed Start Time: 14:30  Free Water Flush  Free Water Flush Instructions:  30 ml/hr via pump  Liquid Protein Supplement     Qty per Day:  1 (01-09-23 @ 13:45) [Active]          Vital Signs Last 24 Hrs  T(C): 37.1 (25 Jan 2023 05:09), Max: 37.1 (25 Jan 2023 05:09)  T(F): 98.7 (25 Jan 2023 05:09), Max: 98.7 (25 Jan 2023 05:09)  HR: 68 (25 Jan 2023 07:53) (63 - 93)  BP: 118/65 (25 Jan 2023 05:09) (113/67 - 120/65)  BP(mean): --  RR: 16 (25 Jan 2023 05:09) (16 - 19)  SpO2: 100% (25 Jan 2023 07:53) (96% - 100%)    Parameters below as of 25 Jan 2023 05:16  Patient On (Oxygen Delivery Method): tracheostomy collar          01-24-23 @ 07:01  -  01-25-23 @ 07:00  --------------------------------------------------------  IN: 240 mL / OUT: 400 mL / NET: -160 mL        Mode: standby      LABS:                        9.5    6.75  )-----------( 258      ( 25 Jan 2023 06:45 )             30.3     01-25    139  |  103  |  24<H>  ----------------------------<  128<H>  4.3   |  31  |  0.72    Ca    8.6      25 Jan 2023 06:45  Phos  3.4     01-25  Mg     2.1     01-25                WBC:  WBC Count: 6.75 K/uL (01-25 @ 06:45)  WBC Count: 6.13 K/uL (01-22 @ 06:42)      MICROBIOLOGY:  RECENT CULTURES:  01-19 Trach Asp Tracheal Aspirate Enterobacter cloacae complex   Few polymorphonuclear leukocytes per low power field  Numerous Squamous epithelial cells per low power field  Few Gram Negative Rods seen per oil power field  Few Gram Positive Cocci seen per oil power field  Rare Gram Positive Rods seen per oil power field   Numerous Enterobacter cloacae complex  Normal Respiratory Larissa absent    01-19 .Blood Blood-Peripheral XXXX XXXX   No Growth Final    01-19 .Blood Blood-Peripheral XXXX XXXX   No Growth Final                    Sodium:  Sodium, Serum: 139 mmol/L (01-25 @ 06:45)  Sodium, Serum: 141 mmol/L (01-22 @ 06:42)      0.72 mg/dL 01-25 @ 06:45  0.84 mg/dL 01-22 @ 06:42      Hemoglobin:  Hemoglobin: 9.5 g/dL (01-25 @ 06:45)  Hemoglobin: 9.3 g/dL (01-22 @ 06:42)      Platelets: Platelet Count - Automated: 258 K/uL (01-25 @ 06:45)  Platelet Count - Automated: 275 K/uL (01-22 @ 06:42)              RADIOLOGY & ADDITIONAL STUDIES:      MICROBIOLOGY:  RECENT CULTURES:  01-19 Trach Asp Tracheal Aspirate Enterobacter cloacae complex   Few polymorphonuclear leukocytes per low power field  Numerous Squamous epithelial cells per low power field  Few Gram Negative Rods seen per oil power field  Few Gram Positive Cocci seen per oil power field  Rare Gram Positive Rods seen per oil power field   Numerous Enterobacter cloacae complex  Normal Respiratory Larissa absent    01-19 .Blood Blood-Peripheral XXXX XXXX   No Growth Final    01-19 .Blood Blood-Peripheral XXXX XXXX   No Growth Final

## 2023-01-25 NOTE — PROGRESS NOTE ADULT - ASSESSMENT
71 yo male w/ pmhx of COPD, CAD sp PCI w/ stent, CABG 2014, HF w/ PeF, 2nd degree heart block, sp PPM, HTN, DM, CKD Stage 3, CVA- lacunar infarcts admitted to ICU originally for cardiac arrest. ACLS for PEA arrest and ROSC returned after 5 minutes. Cardiac arrest possibly secondary to severe hypoglycemia from eating less and not tracking blood sugar carefully. Hospital course complicated by 1) prolonged hypoxemic respiratory failure. sp trach and peg 2) left upper extremity DVT and placed on eliquis 3) multiple infections (enteroccoal facelis cellutis, infected left hand hematoma, tracheobronchitis secondary to citrobacr 4) tonic clonic seizure - on keppra/depakoate- currently undergoing reevaluation by NEUROLOGY 5) fever of 102 on 1/19. INFECTIOUS DISEASE reconsulted and pan culture ordered     # Cardiac Arrest   - s/p ACLS w/ ROSC after 5 min   - 2nd degree heart block, s/p PPM   - ECHO Takotsubo cardiomyopathy. EF 50-55%, LVH, mild pHTN    - s/p ICU course; Hemodynamically stable now  - cont w/ Metoprolol, ASA and atorvastatin     # Acute/Chronic Respiratory failure w/ Hypoxia and Hypercapnia/ COPD   - s/p intubation; failed extubation   - Pulmonary on board   - tolerating Trach collar  - cont w/ Symbicort, Duonebs prn   - cont w/ Trach collar care; f/u Pulmonary recommendations.    # Multiple infectious Disease   - trachobronchitis secondary to citrobacter?? - resolved  - enterococcal faecalis cellulitis - resolved   - infected left hand hematoma - s/p bedside debridement 12/13 w/ hand surgery   - monitor off abx     # Occlusive Left subclavian thrombosis   - LUE venous duplex : occlusive thrombus in the left mid subclavian vein with partial slow flow detected in the lateral subclavian vein.  - LUE arterial Duplex neg   - cont w/ Eliquis     Myoclonic Seizure   - EEG: Myoclonic seizure  - Neurology on board   - cont w/ Keppra Depakote    # HTN   - cont w/ Metoprolol     # DM2   - A1c 8.8%  - cont w/ Lantus   - cont w/ FS monitoring w/ insulin s/s coverage     # Cad s/p PCI w/ stent/ CABG  - cont with ASA and Atorvastatin     # Anemia of Chronic Disease  - h/h stable, will cont  to monitor     # Functional quadriplegic   - RITA on discharge   - supportive care    # Stage 4 decubiti sacrum wound  - s/p debridement 12/19  -cont w/ wound care as recommended     # Dysphagia/ Moderate Protein Calorie Malnutrition  - Peg tube in place   - continue with tube feeding at goal as tolerated.     # Constipation  - cont w/ senna, Miralax prn     Dvt ppx: Eliquis   Dipso: Patient has no insurance and Family in process of getting guardianship. Patient will then need placement.     Plan discussed with son at bedside

## 2023-01-25 NOTE — CHART NOTE - NSCHARTNOTEFT_GEN_A_CORE
Assessment:  Pt adm c dx of hypoglycemia, cardiac arrest, hypothermia, c acute respiratory failure (now chronic), new onset seizures, s/p ELMER, shock liver, E. Coli, UTI, Pt s/p trach, PEG, on vent c trach collar 24/7 since 1/23; pt tolerating. Pt also c LUE VTE c cellulitis, infected left hand hematoma, s/p debridement for sacral ulcer (12/13).  Family in precess of obtaining guardianship; pt maya Cortes has been appointed temporary guardian per SW note; court date 3/14/23; d/c plan for placement noted.    PMH include COPD, CHF, HTN, HLD, CAD, CABG, CKD, CVA, BPH. DM( was on Jardiance, Metformin, Victoza pen, Insulin Lispro 10 units, 2 x day, and Insulin Lantus 20 units per     Factors impacting intake: [ ] none [ ] nausea  [ ] vomiting [ ] diarrhea [ ] constipation  [ ]chewing problems [X ] swallowing issues  [ X] other: inability to self-feed    Diet Prescription: Diet, NPO with Tube Feed:   Tube Feeding Modality: Gastrostomy  Glucerna 1.2 Pedrito  Total Volume for 24 Hours (mL): 1440  Continuous  Until Goal Tube Feed Rate (mL per Hour): 60  Tube Feed Duration (in Hours): 24  Tube Feed Start Time: 14:30  Free Water Flush  Free Water Flush Instructions:  30 ml/hr via pump  Liquid Protein Supplement     Qty per Day:  1 (01-09-23 @ 13:45)    Intake: Glucerna 1.2 @ goal of 60 ml/hr x 24 hrs (provides 1440 ml, 1728 kcal, 86 gram protein, 1166 H2O, 120% RDIs); pt tolerating well    Current Weight: Weight (kg): 77.5 (1/19); admission wt 82.2 (11/14); wt of 111.3 (1/14) is most likely inaccurate as not consistent c other wts;   % Weight Change:  5.7% wt loss x 2 months    No edema documented since 1/7/23; 1+ generalized edema noted at admission    Nutrition focused physical exam conducted:    Subcutaneous fat loss: [mild ] Orbital fat pads region, [WDL ]Buccal fat region, [moderate ]Triceps region,  [WDL ]Ribs region.    Muscle wasting: [mild ]Temples region, [mild ]Clavicle region, [mild ]Shoulder region, [ unable]Scapula region, [unable ]Interosseous region,  [WDL ]thigh region, [WDL ]Calf region      Pertinent Medications: MEDICATIONS  (STANDING):  albuterol/ipratropium for Nebulization 3 milliLiter(s) Nebulizer every 6 hours  apixaban 5 milliGRAM(s) Oral every 12 hours  aspirin  chewable 81 milliGRAM(s) Oral daily  atorvastatin 20 milliGRAM(s) Oral at bedtime  bacitracin   Ointment 1 Application(s) Topical two times a day  bacitracin   Ointment 1 Application(s) Topical two times a day  budesonide 160 MICROgram(s)/formoterol 4.5 MICROgram(s) Inhaler 2 Puff(s) Inhalation two times a day  chlorhexidine 0.12% Liquid 15 milliLiter(s) Oral Mucosa every 12 hours  chlorhexidine 2% Cloths 1 Application(s) Topical <User Schedule>  collagenase Ointment 1 Application(s) Topical two times a day  cyanocobalamin 1000 MICROGram(s) Oral daily  dextrose 5%. 1000 milliLiter(s) (100 mL/Hr) IV Continuous <Continuous>  dextrose 5%. 1000 milliLiter(s) (50 mL/Hr) IV Continuous <Continuous>  dextrose 50% Injectable 25 Gram(s) IV Push once  dextrose 50% Injectable 12.5 Gram(s) IV Push once  dextrose 50% Injectable 25 Gram(s) IV Push once  diphenhydrAMINE Injectable 50 milliGRAM(s) IV Push once  folic acid 1 milliGRAM(s) Oral daily  glucagon  Injectable 1 milliGRAM(s) IntraMuscular once  glycopyrrolate 1 milliGRAM(s) Oral two times a day  insulin glargine Injectable (LANTUS) 10 Unit(s) SubCutaneous at bedtime  insulin lispro (ADMELOG) corrective regimen sliding scale   SubCutaneous three times a day before meals  insulin lispro (ADMELOG) corrective regimen sliding scale   SubCutaneous at bedtime  levETIRAcetam 1500 milliGRAM(s) Oral two times a day  metoprolol tartrate 25 milliGRAM(s) Oral two times a day  polyethylene glycol 3350 17 Gram(s) Oral daily  saline laxative (FLEET) Rectal Enema 1 Enema Rectal once  scopolamine 1 mG/72 Hr(s) Patch 1 Patch Transdermal every 72 hours  senna 2 Tablet(s) Oral at bedtime  silver sulfADIAZINE 1% Cream 1 Application(s) Topical two times a day  valproic  acid Syrup 750 milliGRAM(s) Oral <User Schedule>  vitamin A &amp; D Ointment 1 Application(s) Topical two times a day    MEDICATIONS  (PRN):  acetaminophen     Tablet .. 650 milliGRAM(s) Oral every 6 hours PRN Temp greater or equal to 38C (100.4F), Mild Pain (1 - 3)  albuterol    90 MICROgram(s) HFA Inhaler 2 Puff(s) Inhalation every 6 hours PRN Shortness of Breath and/or Wheezing  aluminum hydroxide/magnesium hydroxide/simethicone Suspension 30 milliLiter(s) Oral every 4 hours PRN Dyspepsia  dextrose Oral Gel 15 Gram(s) Oral once PRN Blood Glucose LESS THAN 70 milliGRAM(s)/deciliter    Pertinent Labs: 01-25 Na139 mmol/L Glu 128 mg/dL<H> K+ 4.3 mmol/L Cr  0.72 mg/dL BUN 24 mg/dL<H> 01-25 Phos 3.4 mg/dL 01-20 Alb 2.2 g/dL<L>  01-22-23   A1C 5.9%; 01-24 POCT: 121, 90, 97    Skin: stage IV pressure ulcer noted on sacrum    Estimated Needs:   [X ] no change since previous assessment (1/9)  [ ] recalculated:     Previous New Nutrition Diagnosis: (12/23)  [x ] Malnutrition; moderate malnutrition in context of acute illness   Related to: increased protein energy needs in setting of cardiac arrest, acute respiratory failure, s/p ELMER, shock liver, pressure ulcers/wounds  As evidenced by: physical findings of mild muscle wasting & mild/moderate fat depletion as noted    GOAL: pt to meet >75% protein-energy needs via tube feeding - continues to be met at this time    Nutrition Diagnosis is [x ] ongoing  [ ] resolved [ ] not applicable     New Nutrition Diagnosis: [x ] not applicable       Interventions:   continue current tube feeding as noted  Recommend  [ ] Change Diet To:  [ ] Nutrition Supplement  [ ] Nutrition Support  [ ] Other:     Monitoring and Evaluation:   [ ] PO intake [ x ] Tolerance to diet prescription [ x ] weights [ x ] labs[ x ] follow up per protocol  [ ] other:

## 2023-01-25 NOTE — PROGRESS NOTE ADULT - ASSESSMENT
REVIEW OF SYMPTOMS      Able to give (reliable) ROS  NO     PHYSICAL EXAM    HEENT Unremarkable  atraumatic   RESP Fair air entry EXP prolonged    Harsh breath sound Resp distres mild   CARDIAC S1 S2 No S3     NO JVD    ABDOMEN SOFT BS PRESENT NOT DISTENDED No hepatosplenomegaly   PEDAL EDEMA present No calf tenderness  NO rash       GENERAL DATA .   GOC.  12/11/2022 full code       ALLGY.  nka                            WT. ..  12/2/2022 86  BMI. ..    12/2/2022 29                          ICU STAY.  .. 11/29-12/9  COVID.   .. 12/2/2022 scv2 (-)   .. 11/20/2022 scv2 (-)   BEST PRACTICE ISSUES.    HOB ELEVATN. Yes  DVT PPLX.    .. 1/3/2023 apixa 5.2 (vte)  12/12 l Subclav throm  ALEJO PPLX.   ..      INFN PPLX.   .. 11/25 chlorhex .12%   .. 11/14 chlorhex 2%   SP SW ANTWAN.         DIET.    ..  12/15 glucerna 1.2 1440 gt   IV fl.  ..            PROCEDURES.  .. 12/19 debridement of sacral wound   .. 12/5/2022 trach    ABGS.  1/6/2023 ac 16/450/.3/5 746/49/123     VS/ PO/IO/ VENT/ DRIPS.  1/25/2023 afeb 120/70   1/24/2023 afeb 68 110/60     PATIENT PRESENTATION.  72 m doa 11/14/2022 cac    PMH  PMH CAD s/p PCI with stent 2015 and CABG 2014,   PMH  s/p medtronic PPM,   PMH CVA (lacunar infarct)    HOSPITAL COURSE   .. 1) PEA arrest with ROSC after approximately 5 min 11/14  Now communicative   .. 2) DVT 12/12 l Subclav thromS 12/15/2022 lvnx 80.2 1/3 changed to apixaban 5.2  .. 3) TRACH 12/5/2022 trach  .. 4) PEG12/5/2022 peg   .. 5) Cellulitis hand absc 12/13 mod enterococcus fecalis  staph epi 12/12-12/15/2022  cephalexin 12/15 vanco once  12/16-12/19 zosyn  .. 6) Vent weaning     PROBLEM ASSESSMENT RECOMMENDATIONS.  WEANING   .. wean as tolerated  .. 1/4/2022  If able to tolerate cpap 5/5 x 2 h will place on tc   .. 1/5/2023 DW Dr Valdovinos Tried on 35% tc   .. 1/6/2023 DW RT Pt to be on monitor during weaning trials and to be placed on vent at night and trach collar during daytime as tolerated   .. 1/7/2023 above reinforced to RT   .. 1/8/2023 placed on trach collar but has lot of secretns lasted 45 min   1/11/2023 tolerated tc several h  1/15/2023 Has been tolerating day time trach collar   TRACH LEAK  .. 1/9/2023 Notified by Hospitalist that pt is losing volunmes and that she is calling surgery to see if trach balloon has leak  HEMOPTYSIS  .. 1/13/2023 Pt having mild hemoptysis last 2 d   .. w 1/13-1/14-1/22/2023 w 10.4- 8.6 - 6.1   .. Hb 1/13-1/22/2023 Hb 9.7 - 9.3   .. Cr 1/13/2023 Cr .9   .. ct 1/13/2023 ct ch cw 1/4/2020 Stable bectasis and cystic changes basal l lower lobe and lingula Stable ssa r base   .. 1/13/2023 Hemoptysis likely sec to suctioning trauma in setting of full dose ac   .. 1/13/2023 VTE unlikely as cause of ac as pt is on fd ac   .. 1/13/2023 was eval by surgrey no trach bleed as dw Dr Arzate   .. 1/13/2023 infection is possibe cause sergio given bectasis so if persists will consider abio   .. 1/20/2023 dw son will try cpap overnight and escalate to ac if resp distress   .. 1/23/2023 dw rt will start 24 h trach collar Pt oin pulse ox and cardiac monitpor   .. 1/24/2023 tolerating 24 h trach collar   INFECTION.  .. w 1/17-1/18-1/20-1/21/2023 w 8.3- 7.9 - 6.8 - 7.2    .. pr 1/17-1/20/2023 (-) - (-)   .. 1/16-1/20/2023 Rocephin started by hospitalist luke   CHF   .. 11/14/2022 echo mod decr segmental lvsf apical lateral apiccal ant segment are abn takotsubo cmpthy pasp 42 .  .. Monitor for chf has chr hfref per echo   COPD   .. 12/30 symbicort 160   .. 1/7/2023 glycopyrrolate 1.2   .. 1/9/2023 ipratropium  .. 1/15/2023 scopolamine   .. continue bd ics for copd   Anemia.  .. Hb 1/12-1/14-1/19-1/20-7/21-1/25/2023       Hb 9.8- 10 -9.1- 9.8  - 9.2 - 9.5   .. target hb 7 (+)  .. monitor   sp cac   .. good neuro recovery awake alert interactive   VTE  .. 1/3/2023 apixa 5.2 (vte)      OVERALL   WEAN as told   .. 1/20/2023 to try noct cpap and day time tc (which he has been tolerating  .. 1/23/2023 dw rt will start 24 h trach collar Pt oin pulse ox and cardiac monitpor   FLAILING OF LOWER EXTR started 1/16 on depakote   suggest Neuro followup 1/19/2023 myoclonic jerks improvd       TIME SPENT   Over 25 minutes aggregate care time spent on encounter; activities included   direct patient care, counseling and/or coordinating care reviewing notes, lab data/ imaging , discussion with multidisciplinary team/ patient  /family and explaining in detail risks, benefits, alternatives  of the recommendations     BRANDON CAMARILLO

## 2023-01-25 NOTE — PROGRESS NOTE ADULT - SUBJECTIVE AND OBJECTIVE BOX
Patient is a 72y old  Male who presents with a chief complaint of s/p cardiac arrest, hypoglycemia (25 Jan 2023 08:52)      INTERVAL HPI/ OVERNIGHT EVENTS: Pt was seen and examined at bedside today, No significant overnight events, pt non-verbal communicating with head nodes and writing, he admits to excessive secretion form trach, SOB with congestion, admits to pain in his right knee      MEDICATIONS  (STANDING):  albuterol/ipratropium for Nebulization 3 milliLiter(s) Nebulizer every 6 hours  apixaban 5 milliGRAM(s) Oral every 12 hours  aspirin  chewable 81 milliGRAM(s) Oral daily  atorvastatin 20 milliGRAM(s) Oral at bedtime  bacitracin   Ointment 1 Application(s) Topical two times a day  bacitracin   Ointment 1 Application(s) Topical two times a day  budesonide 160 MICROgram(s)/formoterol 4.5 MICROgram(s) Inhaler 2 Puff(s) Inhalation two times a day  chlorhexidine 0.12% Liquid 15 milliLiter(s) Oral Mucosa every 12 hours  chlorhexidine 2% Cloths 1 Application(s) Topical <User Schedule>  collagenase Ointment 1 Application(s) Topical two times a day  cyanocobalamin 1000 MICROGram(s) Oral daily  dextrose 5%. 1000 milliLiter(s) (100 mL/Hr) IV Continuous <Continuous>  dextrose 5%. 1000 milliLiter(s) (50 mL/Hr) IV Continuous <Continuous>  dextrose 50% Injectable 25 Gram(s) IV Push once  dextrose 50% Injectable 12.5 Gram(s) IV Push once  dextrose 50% Injectable 25 Gram(s) IV Push once  diphenhydrAMINE Injectable 50 milliGRAM(s) IV Push once  folic acid 1 milliGRAM(s) Oral daily  glucagon  Injectable 1 milliGRAM(s) IntraMuscular once  glycopyrrolate 1 milliGRAM(s) Oral two times a day  insulin glargine Injectable (LANTUS) 10 Unit(s) SubCutaneous at bedtime  insulin lispro (ADMELOG) corrective regimen sliding scale   SubCutaneous three times a day before meals  insulin lispro (ADMELOG) corrective regimen sliding scale   SubCutaneous at bedtime  levETIRAcetam 1500 milliGRAM(s) Oral two times a day  metoprolol tartrate 25 milliGRAM(s) Oral two times a day  polyethylene glycol 3350 17 Gram(s) Oral daily  scopolamine 1 mG/72 Hr(s) Patch 1 Patch Transdermal every 72 hours  senna 2 Tablet(s) Oral at bedtime  silver sulfADIAZINE 1% Cream 1 Application(s) Topical two times a day  valproic  acid Syrup 750 milliGRAM(s) Oral <User Schedule>  vitamin A &amp; D Ointment 1 Application(s) Topical two times a day    MEDICATIONS  (PRN):  acetaminophen     Tablet .. 650 milliGRAM(s) Oral every 6 hours PRN Temp greater or equal to 38C (100.4F), Mild Pain (1 - 3)  albuterol    90 MICROgram(s) HFA Inhaler 2 Puff(s) Inhalation every 6 hours PRN Shortness of Breath and/or Wheezing  aluminum hydroxide/magnesium hydroxide/simethicone Suspension 30 milliLiter(s) Oral every 4 hours PRN Dyspepsia  dextrose Oral Gel 15 Gram(s) Oral once PRN Blood Glucose LESS THAN 70 milliGRAM(s)/deciliter      Allergies    No Known Allergies    Intolerances        REVIEW OF SYSTEMS:    Unable to examine due to [ ] Encephalopathy [ ] Advanced Dementia [ ] Expressive Aphasia [ ] Non-verbal patient    CONSTITUTIONAL: No fever, positive generalized weakness/Fatigue, No weight loss  EYES: No eye pain, visual disturbances, or discharge  ENMT:  No difficulty hearing, tinnitus, vertigo; No sinus or throat pain  NECK: Trach; + excessive secretion   RESPIRATORY: positive shortness of breath, sputum/congestion   CARDIOVASCULAR: No chest pain, palpitations, or leg swelling  GASTROINTESTINAL: No abdominal pain. No nausea, vomiting, diarrhea or constipation. No melena or hematochezia.  GENITOURINARY: No dysuria, frequency, hematuria, or incontinence  NEUROLOGICAL: No headaches, Dizziness, memory loss, loss of strength, numbness, or tremors  SKIN: No itching, burning, rashes, or lesions   MUSCULOSKELETAL: Knee pain   PSYCHIATRIC: No depression, anxiety, mood swings, or difficulty sleeping  HEME/LYMPH: No easy bruising, or bleeding gums      Vital Signs Last 24 Hrs  T(C): 36.6 (25 Jan 2023 10:23), Max: 37.1 (25 Jan 2023 05:09)  T(F): 97.9 (25 Jan 2023 10:23), Max: 98.7 (25 Jan 2023 05:09)  HR: 64 (25 Jan 2023 11:02) (61 - 93)  BP: 129/74 (25 Jan 2023 10:23) (113/67 - 129/74)  BP(mean): --  RR: 18 (25 Jan 2023 10:23) (16 - 19)  SpO2: 99% (25 Jan 2023 11:02) (96% - 100%)    Parameters below as of 25 Jan 2023 11:02  Patient On (Oxygen Delivery Method): tracheostomy collar        PHYSICAL EXAM:  GENERAL: NAD, obese   HEAD:  Atraumatic, Normocephalic  EYES: conjunctiva and sclera clear  ENMT: Moist mucous membranes  NECK: Trach collar; secretions   CHEST/LUNG: Clear to Auscultation bilaterally; No rales, rhonchi, wheezing, or rubs  HEART: Regular rate and rhythm; No murmurs, rubs, or gallops  ABDOMEN: Soft, Nontender, Nondistended; Bowel sounds present  EXTREMITIES:  2+ Peripheral Pulses, No clubbing, cyanosis, or edema  SKIN: No rashes or lesions  NERVOUS SYSTEM:  Alert & Oriented X3, Good concentration; Motor Strength 5/5 B/L upper and lower extremities    LABS:                        9.5    6.75  )-----------( 258      ( 25 Jan 2023 06:45 )             30.3     01-25    139  |  103  |  24<H>  ----------------------------<  128<H>  4.3   |  31  |  0.72    Ca    8.6      25 Jan 2023 06:45  Phos  3.4     01-25  Mg     2.1     01-25          CAPILLARY BLOOD GLUCOSE      POCT Blood Glucose.: 143 mg/dL (25 Jan 2023 11:50)  POCT Blood Glucose.: 134 mg/dL (25 Jan 2023 08:14)  POCT Blood Glucose.: 97 mg/dL (24 Jan 2023 21:35)  POCT Blood Glucose.: 90 mg/dL (24 Jan 2023 16:48)        Culture - Sputum (collected 01-19-23)  Source: Trach Asp Tracheal Aspirate  Gram Stain (01-22-23):    Few polymorphonuclear leukocytes per low power field    Numerous Squamous epithelial cells per low power field    Few Gram Negative Rods seen per oil power field    Few Gram Positive Cocci seen per oil power field    Rare Gram Positive Rods seen per oil power field  Final Report (01-22-23):    Numerous Enterobacter cloacae complex    Normal Respiratory Larissa absent  Organism: Enterobacter cloacae complex (01-22-23)  Organism: Enterobacter cloacae complex (01-22-23)    Sensitivities:      -  Amikacin: S <=16      -  Amoxicillin/Clavulanic Acid: R >16/8      -  Ampicillin: R >16 These ampicillin results predict results for amoxicillin      -  Ampicillin/Sulbactam: R >16/8 Enterobacter, Klebsiella aerogenes, Citrobacter, and Serratia may develop resistance during prolonged therapy (3-4 days)      -  Aztreonam: S <=4      -  Cefazolin: R >16 Enterobacter, Klebsiella aerogenes, Citrobacter, and Serratia may develop resistance during prolonged therapy (3-4 days)      -  Cefepime: S <=2      -  Cefoxitin: R >16      -  Ceftriaxone: I 2 Enterobacter, Klebsiella aerogenes, Citrobacter, and Serratia may develop resistance during prolonged therapy      -  Ciprofloxacin: S <=0.25      -  Ertapenem: S <=0.5      -  Gentamicin: S <=2      -  Imipenem: S <=1      -  Levofloxacin: S <=0.5      -  Meropenem: S <=1      -  Piperacillin/Tazobactam: S <=8      -  Tobramycin: S <=2      -  Trimethoprim/Sulfamethoxazole: S <=0.5/9.5      Method Type: COLIN    Culture - Blood (collected 01-19-23)  Source: .Blood Blood-Peripheral  Final Report (01-25-23):    No Growth Final    Culture - Blood (collected 01-19-23)  Source: .Blood Blood-Peripheral  Final Report (01-25-23):    No Growth Final        RADIOLOGY & ADDITIONAL TESTS:          Imaging Personally Reviewed:  [ ] YES  [ ] NO    Consultant(s) Notes Reviewed:  [ ] YES  [ ] NO    Care Discussed with Consultants/Other Providers [x ] YES  [ ] NO

## 2023-01-26 LAB
GLUCOSE BLDC GLUCOMTR-MCNC: 108 MG/DL — HIGH (ref 70–99)
GLUCOSE BLDC GLUCOMTR-MCNC: 111 MG/DL — HIGH (ref 70–99)
GLUCOSE BLDC GLUCOMTR-MCNC: 128 MG/DL — HIGH (ref 70–99)
GLUCOSE BLDC GLUCOMTR-MCNC: 140 MG/DL — HIGH (ref 70–99)

## 2023-01-26 PROCEDURE — 99232 SBSQ HOSP IP/OBS MODERATE 35: CPT

## 2023-01-26 RX ADMIN — APIXABAN 5 MILLIGRAM(S): 2.5 TABLET, FILM COATED ORAL at 06:37

## 2023-01-26 RX ADMIN — Medication 750 MILLIGRAM(S): at 21:09

## 2023-01-26 RX ADMIN — Medication 3 MILLILITER(S): at 17:09

## 2023-01-26 RX ADMIN — Medication 0: at 08:34

## 2023-01-26 RX ADMIN — ATORVASTATIN CALCIUM 20 MILLIGRAM(S): 80 TABLET, FILM COATED ORAL at 21:09

## 2023-01-26 RX ADMIN — ROBINUL 1 MILLIGRAM(S): 0.2 INJECTION INTRAMUSCULAR; INTRAVENOUS at 06:37

## 2023-01-26 RX ADMIN — Medication 1 APPLICATION(S): at 17:49

## 2023-01-26 RX ADMIN — BUDESONIDE AND FORMOTEROL FUMARATE DIHYDRATE 2 PUFF(S): 160; 4.5 AEROSOL RESPIRATORY (INHALATION) at 17:33

## 2023-01-26 RX ADMIN — Medication 1 APPLICATION(S): at 06:33

## 2023-01-26 RX ADMIN — Medication 750 MILLIGRAM(S): at 09:15

## 2023-01-26 RX ADMIN — SCOPALAMINE 1 PATCH: 1 PATCH, EXTENDED RELEASE TRANSDERMAL at 08:05

## 2023-01-26 RX ADMIN — Medication 1 APPLICATION(S): at 17:50

## 2023-01-26 RX ADMIN — CHLORHEXIDINE GLUCONATE 15 MILLILITER(S): 213 SOLUTION TOPICAL at 06:33

## 2023-01-26 RX ADMIN — LEVETIRACETAM 1500 MILLIGRAM(S): 250 TABLET, FILM COATED ORAL at 17:48

## 2023-01-26 RX ADMIN — Medication 25 MILLIGRAM(S): at 06:36

## 2023-01-26 RX ADMIN — Medication 25 MILLIGRAM(S): at 17:48

## 2023-01-26 RX ADMIN — Medication 1 APPLICATION(S): at 06:35

## 2023-01-26 RX ADMIN — APIXABAN 5 MILLIGRAM(S): 2.5 TABLET, FILM COATED ORAL at 17:47

## 2023-01-26 RX ADMIN — PREGABALIN 1000 MICROGRAM(S): 225 CAPSULE ORAL at 11:54

## 2023-01-26 RX ADMIN — ROBINUL 1 MILLIGRAM(S): 0.2 INJECTION INTRAMUSCULAR; INTRAVENOUS at 17:47

## 2023-01-26 RX ADMIN — LEVETIRACETAM 1500 MILLIGRAM(S): 250 TABLET, FILM COATED ORAL at 06:37

## 2023-01-26 RX ADMIN — Medication 1 APPLICATION(S): at 06:34

## 2023-01-26 RX ADMIN — CHLORHEXIDINE GLUCONATE 1 APPLICATION(S): 213 SOLUTION TOPICAL at 06:33

## 2023-01-26 RX ADMIN — Medication 3 MILLILITER(S): at 11:05

## 2023-01-26 RX ADMIN — Medication 81 MILLIGRAM(S): at 11:54

## 2023-01-26 RX ADMIN — Medication 3 MILLILITER(S): at 05:33

## 2023-01-26 RX ADMIN — Medication 1 APPLICATION(S): at 17:52

## 2023-01-26 RX ADMIN — Medication 1 MILLIGRAM(S): at 11:54

## 2023-01-26 RX ADMIN — INSULIN GLARGINE 10 UNIT(S): 100 INJECTION, SOLUTION SUBCUTANEOUS at 21:16

## 2023-01-26 RX ADMIN — CHLORHEXIDINE GLUCONATE 15 MILLILITER(S): 213 SOLUTION TOPICAL at 17:48

## 2023-01-26 RX ADMIN — BUDESONIDE AND FORMOTEROL FUMARATE DIHYDRATE 2 PUFF(S): 160; 4.5 AEROSOL RESPIRATORY (INHALATION) at 05:35

## 2023-01-26 RX ADMIN — POLYETHYLENE GLYCOL 3350 17 GRAM(S): 17 POWDER, FOR SOLUTION ORAL at 12:01

## 2023-01-26 NOTE — PROGRESS NOTE ADULT - SUBJECTIVE AND OBJECTIVE BOX
BRANDON CAMARILLO    [facility] [address]    Allergies    No Known Allergies    Intolerances        PAST MEDICAL & SURGICAL HISTORY:  HTN (hypertension)      HLD (hyperlipidemia)      Diabetes mellitus      Lacunar infarction      BPH (benign prostatic hyperplasia)      CHF (congestive heart failure)      Chronic obstructive pulmonary disease, unspecified COPD type      S/P CABG (coronary artery bypass graft)  2014          FAMILY HISTORY:      Home Medications:  aspirin 81 mg oral delayed release tablet: 1 tab(s) orally once a day (12 Jun 2020 17:35)  Liquifilm Tears preserved ophthalmic solution: 1 drop(s) to each affected eye 2 times a day (12 Jun 2020 17:35)      MEDICATIONS  (STANDING):  albuterol/ipratropium for Nebulization 3 milliLiter(s) Nebulizer every 6 hours  apixaban 5 milliGRAM(s) Oral every 12 hours  aspirin  chewable 81 milliGRAM(s) Oral daily  atorvastatin 20 milliGRAM(s) Oral at bedtime  bacitracin   Ointment 1 Application(s) Topical two times a day  bacitracin   Ointment 1 Application(s) Topical two times a day  budesonide 160 MICROgram(s)/formoterol 4.5 MICROgram(s) Inhaler 2 Puff(s) Inhalation two times a day  chlorhexidine 0.12% Liquid 15 milliLiter(s) Oral Mucosa every 12 hours  chlorhexidine 2% Cloths 1 Application(s) Topical <User Schedule>  collagenase Ointment 1 Application(s) Topical two times a day  cyanocobalamin 1000 MICROGram(s) Oral daily  dextrose 5%. 1000 milliLiter(s) (50 mL/Hr) IV Continuous <Continuous>  dextrose 5%. 1000 milliLiter(s) (100 mL/Hr) IV Continuous <Continuous>  dextrose 50% Injectable 25 Gram(s) IV Push once  dextrose 50% Injectable 12.5 Gram(s) IV Push once  dextrose 50% Injectable 25 Gram(s) IV Push once  diphenhydrAMINE Injectable 50 milliGRAM(s) IV Push once  folic acid 1 milliGRAM(s) Oral daily  glucagon  Injectable 1 milliGRAM(s) IntraMuscular once  glycopyrrolate 1 milliGRAM(s) Oral two times a day  insulin glargine Injectable (LANTUS) 10 Unit(s) SubCutaneous at bedtime  insulin lispro (ADMELOG) corrective regimen sliding scale   SubCutaneous three times a day before meals  insulin lispro (ADMELOG) corrective regimen sliding scale   SubCutaneous at bedtime  levETIRAcetam 1500 milliGRAM(s) Oral two times a day  metoprolol tartrate 25 milliGRAM(s) Oral two times a day  polyethylene glycol 3350 17 Gram(s) Oral daily  scopolamine 1 mG/72 Hr(s) Patch 1 Patch Transdermal every 72 hours  senna 2 Tablet(s) Oral at bedtime  silver sulfADIAZINE 1% Cream 1 Application(s) Topical two times a day  valproic  acid Syrup 750 milliGRAM(s) Oral <User Schedule>  vitamin A &amp; D Ointment 1 Application(s) Topical two times a day    MEDICATIONS  (PRN):  acetaminophen     Tablet .. 650 milliGRAM(s) Oral every 6 hours PRN Temp greater or equal to 38C (100.4F), Mild Pain (1 - 3)  albuterol    90 MICROgram(s) HFA Inhaler 2 Puff(s) Inhalation every 6 hours PRN Shortness of Breath and/or Wheezing  aluminum hydroxide/magnesium hydroxide/simethicone Suspension 30 milliLiter(s) Oral every 4 hours PRN Dyspepsia  dextrose Oral Gel 15 Gram(s) Oral once PRN Blood Glucose LESS THAN 70 milliGRAM(s)/deciliter      Diet, NPO with Tube Feed:   Tube Feeding Modality: Gastrostomy  Glucerna 1.2 Pedrito  Total Volume for 24 Hours (mL): 1440  Continuous  Until Goal Tube Feed Rate (mL per Hour): 60  Tube Feed Duration (in Hours): 24  Tube Feed Start Time: 14:30  Free Water Flush  Free Water Flush Instructions:  30 ml/hr via pump  Liquid Protein Supplement     Qty per Day:  1 (01-09-23 @ 13:45) [Active]          Vital Signs Last 24 Hrs  T(C): 36.8 (26 Jan 2023 10:26), Max: 37.2 (25 Jan 2023 17:08)  T(F): 98.3 (26 Jan 2023 10:26), Max: 98.9 (25 Jan 2023 17:08)  HR: 64 (26 Jan 2023 10:26) (60 - 95)  BP: 124/74 (26 Jan 2023 10:26) (108/68 - 124/74)  BP(mean): 81 (25 Jan 2023 17:08) (81 - 81)  RR: 18 (26 Jan 2023 10:26) (15 - 18)  SpO2: 97% (26 Jan 2023 10:26) (96% - 100%)    Parameters below as of 26 Jan 2023 08:55  Patient On (Oxygen Delivery Method): tracheostomy collar  O2 Flow (L/min): 8  O2 Concentration (%): 35      01-25-23 @ 07:01  -  01-26-23 @ 07:00  --------------------------------------------------------  IN: 100 mL / OUT: 550 mL / NET: -450 mL        Mode: standby,no resp distress noted.      LABS:                        9.5    6.75  )-----------( 258      ( 25 Jan 2023 06:45 )             30.3     01-25    139  |  103  |  24<H>  ----------------------------<  128<H>  4.3   |  31  |  0.72    Ca    8.6      25 Jan 2023 06:45  Phos  3.4     01-25  Mg     2.1     01-25                WBC:  WBC Count: 6.75 K/uL (01-25 @ 06:45)      MICROBIOLOGY:  RECENT CULTURES:  01-19 Trach Asp Tracheal Aspirate Enterobacter cloacae complex   Few polymorphonuclear leukocytes per low power field  Numerous Squamous epithelial cells per low power field  Few Gram Negative Rods seen per oil power field  Few Gram Positive Cocci seen per oil power field  Rare Gram Positive Rods seen per oil power field   Numerous Enterobacter cloacae complex  Normal Respiratory Larissa absent    01-19 .Blood Blood-Peripheral XXXX XXXX   No Growth Final    01-19 .Blood Blood-Peripheral XXXX XXXX   No Growth Final                    Sodium:  Sodium, Serum: 139 mmol/L (01-25 @ 06:45)      0.72 mg/dL 01-25 @ 06:45      Hemoglobin:  Hemoglobin: 9.5 g/dL (01-25 @ 06:45)      Platelets: Platelet Count - Automated: 258 K/uL (01-25 @ 06:45)              RADIOLOGY & ADDITIONAL STUDIES:      MICROBIOLOGY:  RECENT CULTURES:  01-19 Trach Asp Tracheal Aspirate Enterobacter cloacae complex   Few polymorphonuclear leukocytes per low power field  Numerous Squamous epithelial cells per low power field  Few Gram Negative Rods seen per oil power field  Few Gram Positive Cocci seen per oil power field  Rare Gram Positive Rods seen per oil power field   Numerous Enterobacter cloacae complex  Normal Respiratory Larsisa absent    01-19 .Blood Blood-Peripheral XXXX XXXX   No Growth Final    01-19 .Blood Blood-Peripheral XXXX XXXX   No Growth Final

## 2023-01-26 NOTE — PROGRESS NOTE ADULT - ASSESSMENT
REVIEW OF SYMPTOMS      Able to give (reliable) ROS  NO     PHYSICAL EXAM    HEENT Unremarkable  atraumatic   RESP Fair air entry EXP prolonged    Harsh breath sound Resp distres mild   CARDIAC S1 S2 No S3     NO JVD    ABDOMEN SOFT BS PRESENT NOT DISTENDED No hepatosplenomegaly   PEDAL EDEMA present No calf tenderness  NO rash       GENERAL DATA .   GOC.  12/11/2022 full code       ALLGY.  nka                            WT. ..  12/2/2022 86  BMI. ..    12/2/2022 29                          ICU STAY.  .. 11/29-12/9  COVID.   .. 12/2/2022 scv2 (-)   .. 11/20/2022 scv2 (-)   BEST PRACTICE ISSUES.    HOB ELEVATN. Yes  DVT PPLX.    .. 1/3/2023 apixa 5.2 (vte)  12/12 l Subclav throm  ALEJO PPLX.   ..      INFN PPLX.   .. 11/25 chlorhex .12%   .. 11/14 chlorhex 2%   SP SW ANTWAN.         DIET.    ..  12/15 glucerna 1.2 1440 gt   IV fl.  ..            PROCEDURES.  .. 12/19 debridement of sacral wound   .. 12/5/2022 trach    ABGS.  1/6/2023 ac 16/450/.3/5 746/49/123     VS/ PO/IO/ VENT/ DRIPS.  1/26/2023 afeb 63 120/70   1/26/2023 tc 35% po 96%     PATIENT PRESENTATION.  72 m doa 11/14/2022 cac    PMH  PMH CAD s/p PCI with stent 2015 and CABG 2014,   PMH  s/p medtronic PPM,   PMH CVA (lacunar infarct)    HOSPITAL COURSE   .. 1) PEA arrest with ROSC after approximately 5 min 11/14  Now communicative   .. 2) DVT 12/12 l Subclav thromS 12/15/2022 lvnx 80.2 1/3 changed to apixaban 5.2  .. 3) TRACH 12/5/2022 trach  .. 4) PEG12/5/2022 peg   .. 5) Cellulitis hand absc 12/13 mod enterococcus fecalis  staph epi 12/12-12/15/2022  cephalexin 12/15 vanco once  12/16-12/19 zosyn  .. 6) Vent weaning     PROBLEM ASSESSMENT RECOMMENDATIONS.  WEANING   .. wean as tolerated  .. 1/4/2022  If able to tolerate cpap 5/5 x 2 h will place on tc   .. 1/5/2023 DW Dr Valdovinos Tried on 35% tc   .. 1/6/2023 DW RT Pt to be on monitor during weaning trials and to be placed on vent at night and trach collar during daytime as tolerated   .. 1/7/2023 above reinforced to RT   .. 1/8/2023 placed on trach collar but has lot of secretns lasted 45 min   1/11/2023 tolerated tc several h  1/15/2023 Has been tolerating day time trach collar   TRACH LEAK  .. 1/9/2023 Notified by Hospitalist that pt is losing volunmes and that she is calling surgery to see if trach balloon has leak  HEMOPTYSIS  .. 1/13/2023 Pt having mild hemoptysis last 2 d   .. w 1/13-1/14-1/22/2023 w 10.4- 8.6 - 6.1   .. Hb 1/13-1/22/2023 Hb 9.7 - 9.3   .. Cr 1/13/2023 Cr .9   .. ct 1/13/2023 ct ch cw 1/4/2020 Stable bectasis and cystic changes basal l lower lobe and lingula Stable ssa r base   .. 1/13/2023 Hemoptysis likely sec to suctioning trauma in setting of full dose ac   .. 1/13/2023 VTE unlikely as cause of ac as pt is on fd ac   .. 1/13/2023 was eval by surgrey no trach bleed as dw Dr Arzate   .. 1/13/2023 infection is possibe cause sergio given bectasis so if persists will consider abio   .. 1/20/2023 dw son will try cpap overnight and escalate to ac if resp distress   .. 1/23/2023 dw rt will start 24 h trach collar Pt oin pulse ox and cardiac monitpor   .. 1/24/2023 tolerating 24 h trach collar   .. 1/26/2023 has been off vent x 3 d  will dc vent  INFECTION.  .. w 1/17-1/18-1/20-1/21/2023 w 8.3- 7.9 - 6.8 - 7.2    .. pr 1/17-1/20/2023 (-) - (-)   .. 1/16-1/20/2023 Rocephin started by hospitalist luke   CHF   .. 11/14/2022 echo mod decr segmental lvsf apical lateral apiccal ant segment are abn takotsubo cmpthy pasp 42 .  .. Monitor for chf has chr hfref per echo   COPD   .. 12/30 symbicort 160   .. 1/7/2023 glycopyrrolate 1.2   .. 1/9/2023 ipratropium  .. 1/15/2023 scopolamine   .. continue bd ics for copd   Anemia.  .. Hb 1/12-1/14-1/19-1/20-7/21-1/25/2023       Hb 9.8- 10 -9.1- 9.8  - 9.2 - 9.5   .. target hb 7 (+)  .. monitor   sp cac   .. good neuro recovery awake alert interactive   VTE  .. 1/3/2023 apixa 5.2 (vte)      OVERALL   WEAN as told   .. 1/20/2023 to try noct cpap and day time tc (which he has been tolerating  .. 1/23/2023 dw rt will start 24 h trach collar Pt oin pulse ox and cardiac monitpor   .. 1/26/2023 has been off vent 3 d will dc vent  FLAILING OF LOWER EXTR started 1/16 on depakote   suggest Neuro followup 1/19/2023 myoclonic jerks improvd     TIME SPENT   Over 25 minutes aggregate care time spent on encounter; activities included   direct patient care, counseling and/or coordinating care reviewing notes, lab data/ imaging , discussion with multidisciplinary team/ patient  /family and explaining in detail risks, benefits, alternatives  of the recommendations     BRANDON CAMARILLO

## 2023-01-26 NOTE — PROGRESS NOTE ADULT - SUBJECTIVE AND OBJECTIVE BOX
Patient is a 72y old  Male who presents with a chief complaint of s/p cardiac arrest, hypoglycemia (25 Jan 2023 08:52)      INTERVAL HPI/ OVERNIGHT EVENTS: Pt was seen and examined at bedside today, No significant overnight events, pt non-verbal communicating with head nodes and writing, he admits to excessive secretion form trach, denies SOB today, still has knee pain on the right but admits that it is very mild today.     MEDICATIONS  (STANDING):  albuterol/ipratropium for Nebulization 3 milliLiter(s) Nebulizer every 6 hours  apixaban 5 milliGRAM(s) Oral every 12 hours  aspirin  chewable 81 milliGRAM(s) Oral daily  atorvastatin 20 milliGRAM(s) Oral at bedtime  bacitracin   Ointment 1 Application(s) Topical two times a day  bacitracin   Ointment 1 Application(s) Topical two times a day  budesonide 160 MICROgram(s)/formoterol 4.5 MICROgram(s) Inhaler 2 Puff(s) Inhalation two times a day  chlorhexidine 2% Cloths 1 Application(s) Topical <User Schedule>  collagenase Ointment 1 Application(s) Topical two times a day  cyanocobalamin 1000 MICROGram(s) Oral daily  dextrose 5%. 1000 milliLiter(s) (50 mL/Hr) IV Continuous <Continuous>  dextrose 5%. 1000 milliLiter(s) (100 mL/Hr) IV Continuous <Continuous>  dextrose 50% Injectable 25 Gram(s) IV Push once  dextrose 50% Injectable 12.5 Gram(s) IV Push once  dextrose 50% Injectable 25 Gram(s) IV Push once  diphenhydrAMINE Injectable 50 milliGRAM(s) IV Push once  folic acid 1 milliGRAM(s) Oral daily  glucagon  Injectable 1 milliGRAM(s) IntraMuscular once  glycopyrrolate 1 milliGRAM(s) Oral two times a day  insulin glargine Injectable (LANTUS) 10 Unit(s) SubCutaneous at bedtime  insulin lispro (ADMELOG) corrective regimen sliding scale   SubCutaneous three times a day before meals  insulin lispro (ADMELOG) corrective regimen sliding scale   SubCutaneous at bedtime  levETIRAcetam 1500 milliGRAM(s) Oral two times a day  metoprolol tartrate 25 milliGRAM(s) Oral two times a day  polyethylene glycol 3350 17 Gram(s) Oral daily  scopolamine 1 mG/72 Hr(s) Patch 1 Patch Transdermal every 72 hours  senna 2 Tablet(s) Oral at bedtime  silver sulfADIAZINE 1% Cream 1 Application(s) Topical two times a day  valproic  acid Syrup 750 milliGRAM(s) Oral <User Schedule>  vitamin A &amp; D Ointment 1 Application(s) Topical two times a day    MEDICATIONS  (PRN):  acetaminophen     Tablet .. 650 milliGRAM(s) Oral every 6 hours PRN Temp greater or equal to 38C (100.4F), Mild Pain (1 - 3)  albuterol    90 MICROgram(s) HFA Inhaler 2 Puff(s) Inhalation every 6 hours PRN Shortness of Breath and/or Wheezing  aluminum hydroxide/magnesium hydroxide/simethicone Suspension 30 milliLiter(s) Oral every 4 hours PRN Dyspepsia  dextrose Oral Gel 15 Gram(s) Oral once PRN Blood Glucose LESS THAN 70 milliGRAM(s)/deciliter        Allergies    No Known Allergies    Intolerances        REVIEW OF SYSTEMS:    Unable to examine due to [ ] Encephalopathy [ ] Advanced Dementia [ ] Expressive Aphasia [ ] Non-verbal patient    CONSTITUTIONAL: No fever, positive generalized weakness/Fatigue, No weight loss  EYES: No eye pain, visual disturbances, or discharge  ENMT:  No difficulty hearing, tinnitus, vertigo; No sinus or throat pain  NECK: Trach; + excessive secretion   RESPIRATORY: positive sputum/congestion, no SOB    CARDIOVASCULAR: No chest pain, palpitations, or leg swelling  GASTROINTESTINAL: No abdominal pain. No nausea, vomiting, diarrhea or constipation. No melena or hematochezia.  GENITOURINARY: No dysuria, frequency, hematuria, or incontinence  NEUROLOGICAL: No headaches, Dizziness, memory loss, loss of strength, numbness, or tremors  SKIN: No itching, burning, rashes, or lesions   MUSCULOSKELETAL: Knee pain   PSYCHIATRIC: No depression, anxiety, mood swings, or difficulty sleeping  HEME/LYMPH: No easy bruising, or bleeding gums      Vital Signs Last 24 Hrs  ICU Vital Signs Last 24 Hrs  T(C): 36.9 (26 Jan 2023 16:26), Max: 36.9 (26 Jan 2023 16:26)  T(F): 98.4 (26 Jan 2023 16:26), Max: 98.4 (26 Jan 2023 16:26)  HR: 88 (26 Jan 2023 18:43) (60 - 95)  BP: 128/74 (26 Jan 2023 16:26) (110/68 - 128/74)  BP(mean): --  ABP: --  ABP(mean): --  RR: 18 (26 Jan 2023 17:37) (15 - 20)  SpO2: 97% (26 Jan 2023 18:43) (96% - 100%)    O2 Parameters below as of 26 Jan 2023 18:43  Patient On (Oxygen Delivery Method): tracheostomy collar        PHYSICAL EXAM:  GENERAL: NAD, obese   HEAD:  Atraumatic, Normocephalic  EYES: conjunctiva and sclera clear  ENMT: Moist mucous membranes  NECK: Trach collar; secretions   CHEST/LUNG: Clear to Auscultation bilaterally; No rales, rhonchi, wheezing, or rubs  HEART: Regular rate and rhythm; No murmurs, rubs, or gallops  ABDOMEN: Soft, Nontender, Nondistended; Bowel sounds present  EXTREMITIES:  2+ Peripheral Pulses, No clubbing, cyanosis, or edema  SKIN: No rashes or lesions  NERVOUS SYSTEM:  Alert & Oriented X3, Good concentration; Motor Strength 5/5 B/L upper and lower extremities    LABS:                             CAPILLARY BLOOD GLUCOSE                  RADIOLOGY & ADDITIONAL TESTS:          Imaging Personally Reviewed:  [ ] YES  [ ] NO    Consultant(s) Notes Reviewed:  [ ] YES  [ ] NO    Care Discussed with Consultants/Other Providers [x ] YES  [ ] NO

## 2023-01-26 NOTE — PROGRESS NOTE ADULT - ASSESSMENT
71 yo male w/ pmhx of COPD, CAD sp PCI w/ stent, CABG 2014, HF w/ PeF, 2nd degree heart block, sp PPM, HTN, DM, CKD Stage 3, CVA- lacunar infarcts admitted to ICU originally for cardiac arrest. ACLS for PEA arrest and ROSC returned after 5 minutes. Cardiac arrest possibly secondary to severe hypoglycemia from eating less and not tracking blood sugar carefully. Hospital course complicated by 1) prolonged hypoxemic respiratory failure. sp trach and peg 2) left upper extremity DVT and placed on eliquis 3) multiple infections (enteroccoal facelis cellutis, infected left hand hematoma, tracheobronchitis secondary to citrobacr 4) tonic clonic seizure - on keppra/depakoate- currently undergoing reevaluation by NEUROLOGY 5) fever of 102 on 1/19. INFECTIOUS DISEASE reconsulted and pan culture ordered     # Cardiac Arrest   - s/p ACLS w/ ROSC after 5 min   - 2nd degree heart block, s/p PPM   - ECHO Takotsubo cardiomyopathy. EF 50-55%, LVH, mild pHTN    - s/p ICU course; Hemodynamically stable now  - cont w/ Metoprolol, ASA and atorvastatin     # Acute/Chronic Respiratory failure w/ Hypoxia and Hypercapnia/ COPD   - s/p intubation; failed extubation   - Pulmonary on board   - tolerating Trach collar, vent discontinued; continue weaning as per Pulmonary   - cont w/ Symbicort, Duonebs prn   - cont w/ Trach collar care; f/u Pulmonary recommendations.    # Multiple infectious Disease   - trachobronchitis secondary to citrobacter?? - resolved  - enterococcal faecalis cellulitis - resolved   - infected left hand hematoma - s/p bedside debridement 12/13 w/ hand surgery   - monitor off abx     # Occlusive Left subclavian thrombosis   - LUE venous duplex : occlusive thrombus in the left mid subclavian vein with partial slow flow detected in the lateral subclavian vein.  - LUE arterial Duplex neg   - cont w/ Eliquis     Myoclonic Seizure   - EEG: Myoclonic seizure  - Neurology on board   - cont w/ Keppra Depakote    # HTN   - cont w/ Metoprolol     # DM2   - A1c 8.8%  - cont w/ Lantus   - cont w/ FS monitoring w/ insulin s/s coverage     # Cad s/p PCI w/ stent/ CABG  - cont with ASA and Atorvastatin     # Anemia of Chronic Disease  - h/h stable, will cont  to monitor     # Functional quadriplegic   - RITA on discharge   - supportive care    # Stage 4 decubiti sacrum wound  - s/p debridement 12/19  -cont w/ wound care as recommended     # Dysphagia/ Moderate Protein Calorie Malnutrition  - Peg tube in place   - continue with tube feeding at goal as tolerated.     # Constipation  - cont w/ senna, Miralax prn     Dvt ppx: Eliquis   Dipso: Patient has no insurance and Family in process of getting guardianship. Patient will then need placement.     Plan discussed with son at bedside

## 2023-01-27 LAB
GLUCOSE BLDC GLUCOMTR-MCNC: 110 MG/DL — HIGH (ref 70–99)
GLUCOSE BLDC GLUCOMTR-MCNC: 121 MG/DL — HIGH (ref 70–99)
GLUCOSE BLDC GLUCOMTR-MCNC: 138 MG/DL — HIGH (ref 70–99)
GLUCOSE BLDC GLUCOMTR-MCNC: 167 MG/DL — HIGH (ref 70–99)

## 2023-01-27 PROCEDURE — 99232 SBSQ HOSP IP/OBS MODERATE 35: CPT

## 2023-01-27 RX ADMIN — Medication 1 APPLICATION(S): at 18:23

## 2023-01-27 RX ADMIN — APIXABAN 5 MILLIGRAM(S): 2.5 TABLET, FILM COATED ORAL at 18:21

## 2023-01-27 RX ADMIN — APIXABAN 5 MILLIGRAM(S): 2.5 TABLET, FILM COATED ORAL at 05:38

## 2023-01-27 RX ADMIN — PREGABALIN 1000 MICROGRAM(S): 225 CAPSULE ORAL at 11:32

## 2023-01-27 RX ADMIN — Medication 25 MILLIGRAM(S): at 18:21

## 2023-01-27 RX ADMIN — LEVETIRACETAM 1500 MILLIGRAM(S): 250 TABLET, FILM COATED ORAL at 05:38

## 2023-01-27 RX ADMIN — Medication 1 APPLICATION(S): at 05:39

## 2023-01-27 RX ADMIN — CHLORHEXIDINE GLUCONATE 1 APPLICATION(S): 213 SOLUTION TOPICAL at 05:30

## 2023-01-27 RX ADMIN — Medication 3 MILLILITER(S): at 05:17

## 2023-01-27 RX ADMIN — Medication 1 APPLICATION(S): at 05:29

## 2023-01-27 RX ADMIN — POLYETHYLENE GLYCOL 3350 17 GRAM(S): 17 POWDER, FOR SOLUTION ORAL at 11:30

## 2023-01-27 RX ADMIN — Medication 1 APPLICATION(S): at 05:30

## 2023-01-27 RX ADMIN — Medication 1 MILLIGRAM(S): at 11:31

## 2023-01-27 RX ADMIN — Medication 3 MILLILITER(S): at 00:35

## 2023-01-27 RX ADMIN — BUDESONIDE AND FORMOTEROL FUMARATE DIHYDRATE 2 PUFF(S): 160; 4.5 AEROSOL RESPIRATORY (INHALATION) at 05:21

## 2023-01-27 RX ADMIN — SCOPALAMINE 1 PATCH: 1 PATCH, EXTENDED RELEASE TRANSDERMAL at 22:41

## 2023-01-27 RX ADMIN — Medication 1 APPLICATION(S): at 18:22

## 2023-01-27 RX ADMIN — Medication 1: at 11:27

## 2023-01-27 RX ADMIN — BUDESONIDE AND FORMOTEROL FUMARATE DIHYDRATE 2 PUFF(S): 160; 4.5 AEROSOL RESPIRATORY (INHALATION) at 17:10

## 2023-01-27 RX ADMIN — ROBINUL 1 MILLIGRAM(S): 0.2 INJECTION INTRAMUSCULAR; INTRAVENOUS at 05:38

## 2023-01-27 RX ADMIN — Medication 3 MILLILITER(S): at 17:10

## 2023-01-27 RX ADMIN — ROBINUL 1 MILLIGRAM(S): 0.2 INJECTION INTRAMUSCULAR; INTRAVENOUS at 18:21

## 2023-01-27 RX ADMIN — INSULIN GLARGINE 10 UNIT(S): 100 INJECTION, SOLUTION SUBCUTANEOUS at 21:15

## 2023-01-27 RX ADMIN — Medication 750 MILLIGRAM(S): at 21:14

## 2023-01-27 RX ADMIN — SCOPALAMINE 1 PATCH: 1 PATCH, EXTENDED RELEASE TRANSDERMAL at 07:26

## 2023-01-27 RX ADMIN — SENNA PLUS 2 TABLET(S): 8.6 TABLET ORAL at 21:14

## 2023-01-27 RX ADMIN — LEVETIRACETAM 1500 MILLIGRAM(S): 250 TABLET, FILM COATED ORAL at 18:21

## 2023-01-27 RX ADMIN — Medication 3 MILLILITER(S): at 23:33

## 2023-01-27 RX ADMIN — Medication 25 MILLIGRAM(S): at 05:39

## 2023-01-27 RX ADMIN — Medication 1 APPLICATION(S): at 18:25

## 2023-01-27 RX ADMIN — ATORVASTATIN CALCIUM 20 MILLIGRAM(S): 80 TABLET, FILM COATED ORAL at 21:14

## 2023-01-27 RX ADMIN — Medication 3 MILLILITER(S): at 11:14

## 2023-01-27 RX ADMIN — SCOPALAMINE 1 PATCH: 1 PATCH, EXTENDED RELEASE TRANSDERMAL at 20:15

## 2023-01-27 RX ADMIN — Medication 81 MILLIGRAM(S): at 11:32

## 2023-01-27 RX ADMIN — Medication 750 MILLIGRAM(S): at 08:18

## 2023-01-27 NOTE — PROGRESS NOTE ADULT - ASSESSMENT
REVIEW OF SYMPTOMS      Able to give (reliable) ROS  NO     PHYSICAL EXAM    HEENT Unremarkable  atraumatic   RESP Fair air entry EXP prolonged    Harsh breath sound Resp distres mild   CARDIAC S1 S2 No S3     NO JVD    ABDOMEN SOFT BS PRESENT NOT DISTENDED No hepatosplenomegaly   PEDAL EDEMA present No calf tenderness  NO rash       GENERAL DATA .   GOC.  12/11/2022 full code       ALLGY.  nka                            WT. ..  12/2/2022 86  BMI. ..    12/2/2022 29                          ICU STAY.  .. 11/29-12/9  COVID.   .. 12/2/2022 scv2 (-)   .. 11/20/2022 scv2 (-)   BEST PRACTICE ISSUES.    HOB ELEVATN. Yes  DVT PPLX.    .. 1/3/2023 apixa 5.2 (vte)  12/12 l Subclav throm  ALEJO PPLX.   ..      INFN PPLX.   .. 11/25 chlorhex .12%   .. 11/14 chlorhex 2%   SP SW ANTWAN.         DIET.    ..  12/15 glucerna 1.2 1440 gt   IV fl.  ..            PROCEDURES.  .. 12/19 debridement of sacral wound   .. 12/5/2022 trach    ABGS.  1/6/2023 ac 16/450/.3/5 746/49/123     VS/ PO/IO/ VENT/ DRIPS.  1/27/2023 afeb 60 110/60   1/27/2023 35% 100%     PATIENT PRESENTATION.  72 m doa 11/14/2022 cac    PMH  PMH CAD s/p PCI with stent 2015 and CABG 2014,   PMH  s/p medtronic PPM,   PMH CVA (lacunar infarct)    HOSPITAL COURSE   .. 1) PEA arrest with ROSC after approximately 5 min 11/14  Now communicative   .. 2) DVT 12/12 l Subclav thromS 12/15/2022 lvnx 80.2 1/3 changed to apixaban 5.2  .. 3) TRACH 12/5/2022 trach  .. 4) PEG12/5/2022 peg   .. 5) Cellulitis hand absc 12/13 mod enterococcus fecalis  staph epi 12/12-12/15/2022  cephalexin 12/15 vanco once  12/16-12/19 zosyn  .. 6) Vent weaning     PROBLEM ASSESSMENT RECOMMENDATIONS.  WEANING VENT  1/15/2023 Has been tolerating day time trach collar   .. 1/20/2023 dw son will try cpap overnight and escalate to ac if resp distress   .. 1/23/2023 dw rt will start 24 h trach collar Pt oin pulse ox and cardiac monitpor   .. 1/24/2023 tolerating 24 h trach collar   .. 1/26/2023 has been off vent x 3 d  will dc vent  TRACH WEAN.  .. 1/27/2023 case dw Dr Gonzalez   .. 1/27/2023 Plan would be to change trach to fenestrated cuffed trach to deflate the balloon and try capping next week  .. 1/27/2023 If goes into resp failure then nonfen inner cannula and vent   INFECTION.  .. w 1/17-1/18-1/20-1/21/2023 w 8.3- 7.9 - 6.8 - 7.2    .. pr 1/17-1/20/2023 (-) - (-)   .. 1/16-1/20/2023 Rocephin started by hospitalist dced   CHF   .. 11/14/2022 echo mod decr segmental lvsf apical lateral apiccal ant segment are abn takotsubo cmpthy pasp 42 .  .. Monitor for chf has chr hfref per echo   COPD   .. 12/30 symbicort 160   .. 1/7/2023 glycopyrrolate 1.2   .. 1/9/2023 ipratropium  .. 1/15/2023 scopolamine   .. continue bd ics for copd   Anemia.  .. Hb 1/12-1/14-1/19-1/20-7/21-1/25/2023       Hb 9.8- 10 -9.1- 9.8  - 9.2 - 9.5   .. target hb 7 (+)  .. monitor   sp cac   .. good neuro recovery awake alert interactive   VTE  .. 1/3/2023 apixa 5.2 (vte)      OVERALL   WEANED OFF VENT   .. 1/26/2023 has been off vent 3 d will dc vent  FLAILING OF LOWER EXTR started 1/16 on depakote   suggest Neuro followup 1/19/2023 myoclonic jerks improvd     TIME SPENT   Over 25 minutes aggregate care time spent on encounter; activities included   direct patient care, counseling and/or coordinating care reviewing notes, lab data/ imaging , discussion with multidisciplinary team/ patient  /family and explaining in detail risks, benefits, alternatives  of the recommendations     BRANDON CAMARILLO

## 2023-01-27 NOTE — PROGRESS NOTE ADULT - SUBJECTIVE AND OBJECTIVE BOX
Patient is a 72y old  Male who presents with a chief complaint of s/p cardiac arrest, hypoglycemia (25 Jan 2023 08:52)      INTERVAL HPI/ OVERNIGHT EVENTS: Pt was seen and examined at bedside today, No significant overnight events, pt admits to some body pain otherwise he denies any new complaints.      MEDICATIONS  (STANDING):  albuterol/ipratropium for Nebulization 3 milliLiter(s) Nebulizer every 6 hours  apixaban 5 milliGRAM(s) Oral every 12 hours  aspirin  chewable 81 milliGRAM(s) Oral daily  atorvastatin 20 milliGRAM(s) Oral at bedtime  bacitracin   Ointment 1 Application(s) Topical two times a day  bacitracin   Ointment 1 Application(s) Topical two times a day  budesonide 160 MICROgram(s)/formoterol 4.5 MICROgram(s) Inhaler 2 Puff(s) Inhalation two times a day  chlorhexidine 2% Cloths 1 Application(s) Topical <User Schedule>  collagenase Ointment 1 Application(s) Topical two times a day  cyanocobalamin 1000 MICROGram(s) Oral daily  dextrose 5%. 1000 milliLiter(s) (100 mL/Hr) IV Continuous <Continuous>  dextrose 5%. 1000 milliLiter(s) (50 mL/Hr) IV Continuous <Continuous>  dextrose 50% Injectable 25 Gram(s) IV Push once  dextrose 50% Injectable 12.5 Gram(s) IV Push once  dextrose 50% Injectable 25 Gram(s) IV Push once  diphenhydrAMINE Injectable 50 milliGRAM(s) IV Push once  folic acid 1 milliGRAM(s) Oral daily  glucagon  Injectable 1 milliGRAM(s) IntraMuscular once  glycopyrrolate 1 milliGRAM(s) Oral two times a day  insulin glargine Injectable (LANTUS) 10 Unit(s) SubCutaneous at bedtime  insulin lispro (ADMELOG) corrective regimen sliding scale   SubCutaneous three times a day before meals  insulin lispro (ADMELOG) corrective regimen sliding scale   SubCutaneous at bedtime  levETIRAcetam 1500 milliGRAM(s) Oral two times a day  metoprolol tartrate 25 milliGRAM(s) Oral two times a day  polyethylene glycol 3350 17 Gram(s) Oral daily  scopolamine 1 mG/72 Hr(s) Patch 1 Patch Transdermal every 72 hours  senna 2 Tablet(s) Oral at bedtime  silver sulfADIAZINE 1% Cream 1 Application(s) Topical two times a day  valproic  acid Syrup 750 milliGRAM(s) Oral <User Schedule>  vitamin A &amp; D Ointment 1 Application(s) Topical two times a day    MEDICATIONS  (PRN):  acetaminophen     Tablet .. 650 milliGRAM(s) Oral every 6 hours PRN Temp greater or equal to 38C (100.4F), Mild Pain (1 - 3)  albuterol    90 MICROgram(s) HFA Inhaler 2 Puff(s) Inhalation every 6 hours PRN Shortness of Breath and/or Wheezing  aluminum hydroxide/magnesium hydroxide/simethicone Suspension 30 milliLiter(s) Oral every 4 hours PRN Dyspepsia  dextrose Oral Gel 15 Gram(s) Oral once PRN Blood Glucose LESS THAN 70 milliGRAM(s)/deciliter      Allergies    No Known Allergies    Intolerances        REVIEW OF SYSTEMS:    Unable to examine due to [ ] Encephalopathy [ ] Advanced Dementia [ ] Expressive Aphasia [ ] Non-verbal patient    CONSTITUTIONAL: No fever, positive generalized weakness/Fatigue, No weight loss  EYES: No eye pain, visual disturbances, or discharge  ENMT:  No difficulty hearing, tinnitus, vertigo; No sinus or throat pain  NECK: Trach; + excessive secretion   RESPIRATORY: positive sputum/congestion, no SOB    CARDIOVASCULAR: No chest pain, palpitations, or leg swelling  GASTROINTESTINAL: No abdominal pain. No nausea, vomiting, diarrhea or constipation. No melena or hematochezia.  GENITOURINARY: No dysuria, frequency, hematuria, or incontinence  NEUROLOGICAL: No headaches, Dizziness, memory loss, loss of strength, numbness, or tremors  SKIN: No itching, burning, rashes, or lesions   MUSCULOSKELETAL: generalized boy pain    PSYCHIATRIC: No depression, anxiety, mood swings, or difficulty sleeping  HEME/LYMPH: No easy bruising, or bleeding gums      Vital Signs Last 24 Hrs  ICU Vital Signs Last 24 Hrs  T(C): 37.3 (27 Jan 2023 10:39), Max: 37.3 (27 Jan 2023 10:39)  T(F): 99.2 (27 Jan 2023 10:39), Max: 99.2 (27 Jan 2023 10:39)  HR: 63 (27 Jan 2023 11:45) (62 - 92)  BP: 110/64 (27 Jan 2023 10:39) (98/58 - 129/74)  BP(mean): --  ABP: --  ABP(mean): --  RR: 20 (27 Jan 2023 10:43) (18 - 20)  SpO2: 96% (27 Jan 2023 11:45) (95% - 100%)    O2 Parameters below as of 27 Jan 2023 11:45  Patient On (Oxygen Delivery Method): tracheostomy collar          PHYSICAL EXAM:  GENERAL: NAD, obese   HEAD:  Atraumatic, Normocephalic  EYES: conjunctiva and sclera clear  ENMT: Moist mucous membranes  NECK: Trach collar; secretions   CHEST/LUNG: Clear to Auscultation bilaterally; No rales, rhonchi, wheezing, or rubs  HEART: Regular rate and rhythm; No murmurs, rubs, or gallops  ABDOMEN: Soft, Nontender, Nondistended; Bowel sounds present  EXTREMITIES:  2+ Peripheral Pulses, No clubbing, cyanosis, or edema  SKIN: Sacral wounds   NERVOUS SYSTEM:  Alert & Oriented X3, Good concentration; Motor Strength 5/5 B/L upper and lower extremities    LABS:                             CAPILLARY BLOOD GLUCOSE                  RADIOLOGY & ADDITIONAL TESTS:          Imaging Personally Reviewed:  [ ] YES  [ ] NO    Consultant(s) Notes Reviewed:  [ ] YES  [ ] NO    Care Discussed with Consultants/Other Providers [x ] YES  [ ] NO

## 2023-01-27 NOTE — PROGRESS NOTE ADULT - SUBJECTIVE AND OBJECTIVE BOX
BRANDON CAMARILLO    [facility] [address] [address]    Allergies    No Known Allergies    Intolerances        PAST MEDICAL & SURGICAL HISTORY:  HTN (hypertension)      HLD (hyperlipidemia)      Diabetes mellitus      Lacunar infarction      BPH (benign prostatic hyperplasia)      CHF (congestive heart failure)      Chronic obstructive pulmonary disease, unspecified COPD type      S/P CABG (coronary artery bypass graft)  2014          FAMILY HISTORY:      Home Medications:  aspirin 81 mg oral delayed release tablet: 1 tab(s) orally once a day (12 Jun 2020 17:35)  Liquifilm Tears preserved ophthalmic solution: 1 drop(s) to each affected eye 2 times a day (12 Jun 2020 17:35)      MEDICATIONS  (STANDING):  albuterol/ipratropium for Nebulization 3 milliLiter(s) Nebulizer every 6 hours  apixaban 5 milliGRAM(s) Oral every 12 hours  aspirin  chewable 81 milliGRAM(s) Oral daily  atorvastatin 20 milliGRAM(s) Oral at bedtime  bacitracin   Ointment 1 Application(s) Topical two times a day  bacitracin   Ointment 1 Application(s) Topical two times a day  budesonide 160 MICROgram(s)/formoterol 4.5 MICROgram(s) Inhaler 2 Puff(s) Inhalation two times a day  chlorhexidine 2% Cloths 1 Application(s) Topical <User Schedule>  collagenase Ointment 1 Application(s) Topical two times a day  cyanocobalamin 1000 MICROGram(s) Oral daily  dextrose 5%. 1000 milliLiter(s) (100 mL/Hr) IV Continuous <Continuous>  dextrose 5%. 1000 milliLiter(s) (50 mL/Hr) IV Continuous <Continuous>  dextrose 50% Injectable 25 Gram(s) IV Push once  dextrose 50% Injectable 12.5 Gram(s) IV Push once  dextrose 50% Injectable 25 Gram(s) IV Push once  diphenhydrAMINE Injectable 50 milliGRAM(s) IV Push once  folic acid 1 milliGRAM(s) Oral daily  glucagon  Injectable 1 milliGRAM(s) IntraMuscular once  glycopyrrolate 1 milliGRAM(s) Oral two times a day  insulin glargine Injectable (LANTUS) 10 Unit(s) SubCutaneous at bedtime  insulin lispro (ADMELOG) corrective regimen sliding scale   SubCutaneous three times a day before meals  insulin lispro (ADMELOG) corrective regimen sliding scale   SubCutaneous at bedtime  levETIRAcetam 1500 milliGRAM(s) Oral two times a day  metoprolol tartrate 25 milliGRAM(s) Oral two times a day  polyethylene glycol 3350 17 Gram(s) Oral daily  scopolamine 1 mG/72 Hr(s) Patch 1 Patch Transdermal every 72 hours  senna 2 Tablet(s) Oral at bedtime  silver sulfADIAZINE 1% Cream 1 Application(s) Topical two times a day  valproic  acid Syrup 750 milliGRAM(s) Oral <User Schedule>  vitamin A &amp; D Ointment 1 Application(s) Topical two times a day    MEDICATIONS  (PRN):  acetaminophen     Tablet .. 650 milliGRAM(s) Oral every 6 hours PRN Temp greater or equal to 38C (100.4F), Mild Pain (1 - 3)  albuterol    90 MICROgram(s) HFA Inhaler 2 Puff(s) Inhalation every 6 hours PRN Shortness of Breath and/or Wheezing  aluminum hydroxide/magnesium hydroxide/simethicone Suspension 30 milliLiter(s) Oral every 4 hours PRN Dyspepsia  dextrose Oral Gel 15 Gram(s) Oral once PRN Blood Glucose LESS THAN 70 milliGRAM(s)/deciliter      Diet, NPO with Tube Feed:   Tube Feeding Modality: Gastrostomy  Glucerna 1.2 Pedrito  Total Volume for 24 Hours (mL): 1440  Continuous  Until Goal Tube Feed Rate (mL per Hour): 60  Tube Feed Duration (in Hours): 24  Tube Feed Start Time: 14:30  Free Water Flush  Free Water Flush Instructions:  30 ml/hr via pump  Liquid Protein Supplement     Qty per Day:  1 (01-09-23 @ 13:45) [Active]          Vital Signs Last 24 Hrs  T(C): 36.5 (27 Jan 2023 05:07), Max: 36.9 (26 Jan 2023 16:26)  T(F): 97.7 (27 Jan 2023 05:07), Max: 98.4 (26 Jan 2023 16:26)  HR: 67 (27 Jan 2023 06:18) (60 - 92)  BP: 129/74 (27 Jan 2023 05:07) (98/58 - 129/74)  BP(mean): --  RR: 20 (27 Jan 2023 06:18) (18 - 20)  SpO2: 100% (27 Jan 2023 06:18) (95% - 100%)    Parameters below as of 27 Jan 2023 06:18  Patient On (Oxygen Delivery Method): tracheostomy collar  O2 Flow (L/min): 7  O2 Concentration (%): 35      01-26-23 @ 07:01  -  01-27-23 @ 07:00  --------------------------------------------------------  IN: 0 mL / OUT: 450 mL / NET: -450 mL        Mode: standby      LABS:                    WBC:  WBC Count: 6.75 K/uL (01-25 @ 06:45)      MICROBIOLOGY:  RECENT CULTURES:                  Sodium:  Sodium, Serum: 139 mmol/L (01-25 @ 06:45)      0.72 mg/dL 01-25 @ 06:45      Hemoglobin:  Hemoglobin: 9.5 g/dL (01-25 @ 06:45)      Platelets: Platelet Count - Automated: 258 K/uL (01-25 @ 06:45)              RADIOLOGY & ADDITIONAL STUDIES:      MICROBIOLOGY:  RECENT CULTURES:

## 2023-01-28 LAB
GLUCOSE BLDC GLUCOMTR-MCNC: 130 MG/DL — HIGH (ref 70–99)
GLUCOSE BLDC GLUCOMTR-MCNC: 150 MG/DL — HIGH (ref 70–99)
GLUCOSE BLDC GLUCOMTR-MCNC: 159 MG/DL — HIGH (ref 70–99)
GLUCOSE BLDC GLUCOMTR-MCNC: 171 MG/DL — HIGH (ref 70–99)

## 2023-01-28 PROCEDURE — 99232 SBSQ HOSP IP/OBS MODERATE 35: CPT

## 2023-01-28 RX ADMIN — ROBINUL 1 MILLIGRAM(S): 0.2 INJECTION INTRAMUSCULAR; INTRAVENOUS at 05:10

## 2023-01-28 RX ADMIN — SCOPALAMINE 1 PATCH: 1 PATCH, EXTENDED RELEASE TRANSDERMAL at 19:20

## 2023-01-28 RX ADMIN — Medication 81 MILLIGRAM(S): at 11:37

## 2023-01-28 RX ADMIN — SCOPALAMINE 1 PATCH: 1 PATCH, EXTENDED RELEASE TRANSDERMAL at 07:24

## 2023-01-28 RX ADMIN — PREGABALIN 1000 MICROGRAM(S): 225 CAPSULE ORAL at 11:37

## 2023-01-28 RX ADMIN — Medication 1 APPLICATION(S): at 17:30

## 2023-01-28 RX ADMIN — Medication 1 APPLICATION(S): at 05:08

## 2023-01-28 RX ADMIN — Medication 3 MILLILITER(S): at 05:21

## 2023-01-28 RX ADMIN — LEVETIRACETAM 1500 MILLIGRAM(S): 250 TABLET, FILM COATED ORAL at 05:10

## 2023-01-28 RX ADMIN — Medication 1 APPLICATION(S): at 05:20

## 2023-01-28 RX ADMIN — BUDESONIDE AND FORMOTEROL FUMARATE DIHYDRATE 2 PUFF(S): 160; 4.5 AEROSOL RESPIRATORY (INHALATION) at 17:05

## 2023-01-28 RX ADMIN — APIXABAN 5 MILLIGRAM(S): 2.5 TABLET, FILM COATED ORAL at 17:29

## 2023-01-28 RX ADMIN — APIXABAN 5 MILLIGRAM(S): 2.5 TABLET, FILM COATED ORAL at 05:10

## 2023-01-28 RX ADMIN — Medication 750 MILLIGRAM(S): at 21:54

## 2023-01-28 RX ADMIN — ROBINUL 1 MILLIGRAM(S): 0.2 INJECTION INTRAMUSCULAR; INTRAVENOUS at 17:29

## 2023-01-28 RX ADMIN — Medication 1 APPLICATION(S): at 17:29

## 2023-01-28 RX ADMIN — Medication 3 MILLILITER(S): at 23:23

## 2023-01-28 RX ADMIN — ATORVASTATIN CALCIUM 20 MILLIGRAM(S): 80 TABLET, FILM COATED ORAL at 21:54

## 2023-01-28 RX ADMIN — Medication 750 MILLIGRAM(S): at 08:44

## 2023-01-28 RX ADMIN — Medication 1 APPLICATION(S): at 17:31

## 2023-01-28 RX ADMIN — BUDESONIDE AND FORMOTEROL FUMARATE DIHYDRATE 2 PUFF(S): 160; 4.5 AEROSOL RESPIRATORY (INHALATION) at 05:23

## 2023-01-28 RX ADMIN — Medication 3 MILLILITER(S): at 17:04

## 2023-01-28 RX ADMIN — Medication 1 APPLICATION(S): at 05:09

## 2023-01-28 RX ADMIN — SENNA PLUS 2 TABLET(S): 8.6 TABLET ORAL at 21:54

## 2023-01-28 RX ADMIN — LEVETIRACETAM 1500 MILLIGRAM(S): 250 TABLET, FILM COATED ORAL at 17:28

## 2023-01-28 RX ADMIN — SCOPALAMINE 1 PATCH: 1 PATCH, EXTENDED RELEASE TRANSDERMAL at 00:04

## 2023-01-28 RX ADMIN — Medication 3 MILLILITER(S): at 11:12

## 2023-01-28 RX ADMIN — CHLORHEXIDINE GLUCONATE 1 APPLICATION(S): 213 SOLUTION TOPICAL at 05:08

## 2023-01-28 RX ADMIN — INSULIN GLARGINE 10 UNIT(S): 100 INJECTION, SOLUTION SUBCUTANEOUS at 21:55

## 2023-01-28 RX ADMIN — POLYETHYLENE GLYCOL 3350 17 GRAM(S): 17 POWDER, FOR SOLUTION ORAL at 11:37

## 2023-01-28 RX ADMIN — Medication 1 MILLIGRAM(S): at 11:37

## 2023-01-28 NOTE — PROGRESS NOTE ADULT - ASSESSMENT
REVIEW OF SYMPTOMS      Able to give (reliable) ROS  NO     PHYSICAL EXAM    HEENT Unremarkable  atraumatic   RESP Fair air entry EXP prolonged    Harsh breath sound Resp distres mild   CARDIAC S1 S2 No S3     NO JVD    ABDOMEN SOFT BS PRESENT NOT DISTENDED No hepatosplenomegaly   PEDAL EDEMA present No calf tenderness  NO rash       GENERAL DATA .   GOC.  12/11/2022 full code       ALLGY.  nka                            WT. ..  12/2/2022 86  BMI. ..    12/2/2022 29                          ICU STAY.  .. 11/29-12/9  COVID.   .. 12/2/2022 scv2 (-)   .. 11/20/2022 scv2 (-)   BEST PRACTICE ISSUES.    HOB ELEVATN. Yes  DVT PPLX.    .. 1/3/2023 apixa 5.2 (vte)  12/12 l Subclav throm  ALEJO PPLX.   ..      INFN PPLX.   .. 11/25 chlorhex .12%   .. 11/14 chlorhex 2%   SP SW ANTWAN.         DIET.    ..  12/15 glucerna 1.2 1440 gt   IV fl.  ..            PROCEDURES.  .. 12/19 debridement of sacral wound   .. 12/5/2022 trach    ABGS.  1/28/2023 afeb 70 100/60   1/28/2023 tc 97%  1/6/2023 ac 16/450/.3/5 746/49/123     VS/ PO/IO/ VENT/ DRIPS.  1/27/2023 afeb 60 110/60   1/27/2023 35% 100%     PATIENT PRESENTATION.  72 m doa 11/14/2022 cac    PMH  PMH CAD s/p PCI with stent 2015 and CABG 2014,   PMH  s/p medtronic PPM,   PMH CVA (lacunar infarct)    HOSPITAL COURSE   .. 1) PEA arrest with ROSC after approximately 5 min 11/14  Now communicative   .. 2) DVT 12/12 l Subclav thromS 12/15/2022 lvnx 80.2 1/3 changed to apixaban 5.2  .. 3) TRACH 12/5/2022 trach  .. 4) PEG12/5/2022 peg   .. 5) Cellulitis hand absc 12/13 mod enterococcus fecalis  staph epi 12/12-12/15/2022  cephalexin 12/15 vanco once  12/16-12/19 zosyn  .. 6) Vent weaning     PROBLEM ASSESSMENT RECOMMENDATIONS.  WEANING VENT  1/15/2023 Has been tolerating day time trach collar   .. 1/20/2023 dw son will try cpap overnight and escalate to ac if resp distress   .. 1/23/2023 dw rt will start 24 h trach collar Pt oin pulse ox and cardiac monitpor   .. 1/24/2023 tolerating 24 h trach collar   .. 1/26/2023 has been off vent x 3 d  will dc vent  TRACH WEAN.  .. 1/27/2023 case dw Dr Gonzalez   .. 1/27/2023 Plan would be to change trach to fenestrated cuffed trach to deflate the balloon and try capping next week  .. 1/27/2023 If goes into resp failure then nonfen inner cannula and vent   INFECTION.  .. w 1/17-1/18-1/20-1/21/2023 w 8.3- 7.9 - 6.8 - 7.2    .. pr 1/17-1/20/2023 (-) - (-)   .. 1/16-1/20/2023 Rocephin started by hospitalist dced   CHF   .. 11/14/2022 echo mod decr segmental lvsf apical lateral apiccal ant segment are abn takotsubo cmpthy pasp 42 .  .. Monitor for chf has chr hfref per echo   COPD   .. 12/30 symbicort 160   .. 1/7/2023 glycopyrrolate 1.2   .. 1/9/2023 ipratropium  .. 1/15/2023 scopolamine   .. continue bd ics for copd   Anemia.  .. Hb 1/12-1/14-1/19-1/20-7/21-1/25/2023       Hb 9.8- 10 -9.1- 9.8  - 9.2 - 9.5   .. target hb 7 (+)  .. monitor   sp cac   .. good neuro recovery awake alert interactive   VTE  .. 1/3/2023 apixa 5.2 (vte)      OVERALL   WEANED OFF VENT   .. 1/26/2023 has been off vent 3 d will dc vent  FLAILING OF LOWER EXTR started 1/16 on depakote   suggest Neuro followup 1/19/2023 myoclonic jerks improvd     TIME SPENT   Over 25 minutes aggregate care time spent on encounter; activities included   direct patient care, counseling and/or coordinating care reviewing notes, lab data/ imaging , discussion with multidisciplinary team/ patient  /family and explaining in detail risks, benefits, alternatives  of the recommendations     BRANDON CAMARILLO

## 2023-01-28 NOTE — PROGRESS NOTE ADULT - SUBJECTIVE AND OBJECTIVE BOX
BRANDON CAMARILLO    [facility] [address]    Allergies    No Known Allergies    Intolerances        PAST MEDICAL & SURGICAL HISTORY:  HTN (hypertension)      HLD (hyperlipidemia)      Diabetes mellitus      Lacunar infarction      BPH (benign prostatic hyperplasia)      CHF (congestive heart failure)      Chronic obstructive pulmonary disease, unspecified COPD type      S/P CABG (coronary artery bypass graft)  2014          FAMILY HISTORY:      Home Medications:  aspirin 81 mg oral delayed release tablet: 1 tab(s) orally once a day (12 Jun 2020 17:35)  Liquifilm Tears preserved ophthalmic solution: 1 drop(s) to each affected eye 2 times a day (12 Jun 2020 17:35)      MEDICATIONS  (STANDING):  albuterol/ipratropium for Nebulization 3 milliLiter(s) Nebulizer every 6 hours  apixaban 5 milliGRAM(s) Oral every 12 hours  aspirin  chewable 81 milliGRAM(s) Oral daily  atorvastatin 20 milliGRAM(s) Oral at bedtime  bacitracin   Ointment 1 Application(s) Topical two times a day  bacitracin   Ointment 1 Application(s) Topical two times a day  budesonide 160 MICROgram(s)/formoterol 4.5 MICROgram(s) Inhaler 2 Puff(s) Inhalation two times a day  chlorhexidine 2% Cloths 1 Application(s) Topical <User Schedule>  collagenase Ointment 1 Application(s) Topical two times a day  cyanocobalamin 1000 MICROGram(s) Oral daily  dextrose 5%. 1000 milliLiter(s) (50 mL/Hr) IV Continuous <Continuous>  dextrose 5%. 1000 milliLiter(s) (100 mL/Hr) IV Continuous <Continuous>  dextrose 50% Injectable 25 Gram(s) IV Push once  dextrose 50% Injectable 12.5 Gram(s) IV Push once  dextrose 50% Injectable 25 Gram(s) IV Push once  diphenhydrAMINE Injectable 50 milliGRAM(s) IV Push once  folic acid 1 milliGRAM(s) Oral daily  glucagon  Injectable 1 milliGRAM(s) IntraMuscular once  glycopyrrolate 1 milliGRAM(s) Oral two times a day  insulin glargine Injectable (LANTUS) 10 Unit(s) SubCutaneous at bedtime  insulin lispro (ADMELOG) corrective regimen sliding scale   SubCutaneous three times a day before meals  insulin lispro (ADMELOG) corrective regimen sliding scale   SubCutaneous at bedtime  levETIRAcetam 1500 milliGRAM(s) Oral two times a day  metoprolol tartrate 25 milliGRAM(s) Oral two times a day  polyethylene glycol 3350 17 Gram(s) Oral daily  scopolamine 1 mG/72 Hr(s) Patch 1 Patch Transdermal every 72 hours  senna 2 Tablet(s) Oral at bedtime  silver sulfADIAZINE 1% Cream 1 Application(s) Topical two times a day  valproic  acid Syrup 750 milliGRAM(s) Oral <User Schedule>  vitamin A &amp; D Ointment 1 Application(s) Topical two times a day    MEDICATIONS  (PRN):  acetaminophen     Tablet .. 650 milliGRAM(s) Oral every 6 hours PRN Temp greater or equal to 38C (100.4F), Mild Pain (1 - 3)  albuterol    90 MICROgram(s) HFA Inhaler 2 Puff(s) Inhalation every 6 hours PRN Shortness of Breath and/or Wheezing  aluminum hydroxide/magnesium hydroxide/simethicone Suspension 30 milliLiter(s) Oral every 4 hours PRN Dyspepsia  dextrose Oral Gel 15 Gram(s) Oral once PRN Blood Glucose LESS THAN 70 milliGRAM(s)/deciliter      Diet, NPO with Tube Feed:   Tube Feeding Modality: Gastrostomy  Glucerna 1.2 Pedrito  Total Volume for 24 Hours (mL): 1440  Continuous  Until Goal Tube Feed Rate (mL per Hour): 60  Tube Feed Duration (in Hours): 24  Tube Feed Start Time: 14:30  Free Water Flush  Free Water Flush Instructions:  30 ml/hr via pump  Liquid Protein Supplement     Qty per Day:  1 (01-09-23 @ 13:45) [Active]          Vital Signs Last 24 Hrs  T(C): 36.9 (28 Jan 2023 05:11), Max: 37.3 (27 Jan 2023 10:39)  T(F): 98.4 (28 Jan 2023 05:11), Max: 99.2 (27 Jan 2023 10:39)  HR: 69 (28 Jan 2023 08:04) (60 - 86)  BP: 157/68 (28 Jan 2023 05:11) (101/58 - 157/68)  BP(mean): --  RR: 18 (28 Jan 2023 08:04) (18 - 20)  SpO2: 98% (28 Jan 2023 08:04) (96% - 100%)    Parameters below as of 28 Jan 2023 08:04  Patient On (Oxygen Delivery Method): tracheostomy collar  O2 Flow (L/min): 7  O2 Concentration (%): 35      01-27-23 @ 07:01  -  01-28-23 @ 07:00  --------------------------------------------------------  IN: 0 mL / OUT: 440 mL / NET: -440 mL              LABS:                    WBC:  WBC Count: 6.75 K/uL (01-25 @ 06:45)      MICROBIOLOGY:  RECENT CULTURES:                  Sodium:  Sodium, Serum: 139 mmol/L (01-25 @ 06:45)      0.72 mg/dL 01-25 @ 06:45      Hemoglobin:  Hemoglobin: 9.5 g/dL (01-25 @ 06:45)      Platelets: Platelet Count - Automated: 258 K/uL (01-25 @ 06:45)              RADIOLOGY & ADDITIONAL STUDIES:      MICROBIOLOGY:  RECENT CULTURES:

## 2023-01-28 NOTE — PROGRESS NOTE ADULT - SUBJECTIVE AND OBJECTIVE BOX
Patient is a 72y old  Male who presents with a chief complaint of s/p cardiac arrest, hypoglycemia (25 Jan 2023 08:52)      INTERVAL HPI/ OVERNIGHT EVENTS: Pt was seen and examined at bedside today, No significant overnight events, pt admits to some body pain that he admits is controlled with analgesics given, he denies any new complaints.      MEDICATIONS  (STANDING):  albuterol/ipratropium for Nebulization 3 milliLiter(s) Nebulizer every 6 hours  apixaban 5 milliGRAM(s) Oral every 12 hours  aspirin  chewable 81 milliGRAM(s) Oral daily  atorvastatin 20 milliGRAM(s) Oral at bedtime  bacitracin   Ointment 1 Application(s) Topical two times a day  bacitracin   Ointment 1 Application(s) Topical two times a day  budesonide 160 MICROgram(s)/formoterol 4.5 MICROgram(s) Inhaler 2 Puff(s) Inhalation two times a day  chlorhexidine 2% Cloths 1 Application(s) Topical <User Schedule>  collagenase Ointment 1 Application(s) Topical two times a day  cyanocobalamin 1000 MICROGram(s) Oral daily  dextrose 5%. 1000 milliLiter(s) (50 mL/Hr) IV Continuous <Continuous>  dextrose 5%. 1000 milliLiter(s) (100 mL/Hr) IV Continuous <Continuous>  dextrose 50% Injectable 25 Gram(s) IV Push once  dextrose 50% Injectable 12.5 Gram(s) IV Push once  dextrose 50% Injectable 25 Gram(s) IV Push once  diphenhydrAMINE Injectable 50 milliGRAM(s) IV Push once  folic acid 1 milliGRAM(s) Oral daily  glucagon  Injectable 1 milliGRAM(s) IntraMuscular once  glycopyrrolate 1 milliGRAM(s) Oral two times a day  insulin glargine Injectable (LANTUS) 10 Unit(s) SubCutaneous at bedtime  insulin lispro (ADMELOG) corrective regimen sliding scale   SubCutaneous three times a day before meals  insulin lispro (ADMELOG) corrective regimen sliding scale   SubCutaneous at bedtime  levETIRAcetam 1500 milliGRAM(s) Oral two times a day  metoprolol tartrate 25 milliGRAM(s) Oral two times a day  polyethylene glycol 3350 17 Gram(s) Oral daily  scopolamine 1 mG/72 Hr(s) Patch 1 Patch Transdermal every 72 hours  senna 2 Tablet(s) Oral at bedtime  silver sulfADIAZINE 1% Cream 1 Application(s) Topical two times a day  valproic  acid Syrup 750 milliGRAM(s) Oral <User Schedule>  vitamin A &amp; D Ointment 1 Application(s) Topical two times a day    MEDICATIONS  (PRN):  acetaminophen     Tablet .. 650 milliGRAM(s) Oral every 6 hours PRN Temp greater or equal to 38C (100.4F), Mild Pain (1 - 3)  albuterol    90 MICROgram(s) HFA Inhaler 2 Puff(s) Inhalation every 6 hours PRN Shortness of Breath and/or Wheezing  aluminum hydroxide/magnesium hydroxide/simethicone Suspension 30 milliLiter(s) Oral every 4 hours PRN Dyspepsia  dextrose Oral Gel 15 Gram(s) Oral once PRN Blood Glucose LESS THAN 70 milliGRAM(s)/deciliter        Allergies    No Known Allergies    Intolerances        REVIEW OF SYSTEMS:    Unable to examine due to [ ] Encephalopathy [ ] Advanced Dementia [ ] Expressive Aphasia [ ] Non-verbal patient    CONSTITUTIONAL: No fever, positive generalized weakness/Fatigue, No weight loss  EYES: No eye pain, visual disturbances, or discharge  ENMT:  No difficulty hearing, tinnitus, vertigo  NECK: Trach; + excessive secretion   RESPIRATORY: positive sputum/congestion, no SOB    CARDIOVASCULAR: No chest pain, palpitations, or leg swelling  GASTROINTESTINAL: No abdominal pain. No nausea, vomiting, diarrhea or constipation. No melena or hematochezia.  GENITOURINARY: No dysuria, frequency, hematuria, or incontinence  NEUROLOGICAL: No headaches, Dizziness, memory loss, loss of strength, numbness, or tremors  SKIN: No itching, burning, rashes, or lesions   MUSCULOSKELETAL: generalized boy pain    PSYCHIATRIC: No depression, anxiety, mood swings, or difficulty sleeping  HEME/LYMPH: No easy bruising, or bleeding gums      Vital Signs Last 24 Hrs  T(C): 36.8 (28 Jan 2023 15:55), Max: 36.9 (28 Jan 2023 05:11)  T(F): 98.2 (28 Jan 2023 15:55), Max: 98.4 (28 Jan 2023 05:11)  HR: 70 (28 Jan 2023 15:55) (60 - 86)  BP: 101/62 (28 Jan 2023 15:55) (97/60 - 157/68)  BP(mean): --  ABP: --  ABP(mean): --  RR: 20 (28 Jan 2023 15:55) (18 - 20)  SpO2: 99% (28 Jan 2023 15:55) (96% - 100%)    O2 Parameters below as of 28 Jan 2023 11:12  Patient On (Oxygen Delivery Method): tracheostomy collar  O2 Flow (L/min): 7  O2 Concentration (%): 35        PHYSICAL EXAM:  GENERAL: NAD, obese   HEAD:  Atraumatic, Normocephalic  EYES: conjunctiva and sclera clear  ENMT: Moist mucous membranes  NECK: Trach collar; secretions   CHEST/LUNG: Clear to Auscultation bilaterally; No rales, rhonchi, wheezing, or rubs  HEART: Regular rate and rhythm; No murmurs, rubs, or gallops  ABDOMEN: Soft, Nontender, Nondistended; Bowel sounds present  EXTREMITIES:  2+ Peripheral Pulses, No clubbing, cyanosis, or edema  SKIN: Sacral wounds   NERVOUS SYSTEM:  Alert & Oriented X3, Good concentration; Motor Strength 5/5 B/L upper and lower extremities    LABS:                             CAPILLARY BLOOD GLUCOSE                  RADIOLOGY & ADDITIONAL TESTS:          Imaging Personally Reviewed:  [ ] YES  [ ] NO    Consultant(s) Notes Reviewed:  [ ] YES  [ ] NO    Care Discussed with Consultants/Other Providers [x ] YES  [ ] NO

## 2023-01-29 LAB
GLUCOSE BLDC GLUCOMTR-MCNC: 160 MG/DL — HIGH (ref 70–99)
GLUCOSE BLDC GLUCOMTR-MCNC: 163 MG/DL — HIGH (ref 70–99)
GLUCOSE BLDC GLUCOMTR-MCNC: 171 MG/DL — HIGH (ref 70–99)
GLUCOSE BLDC GLUCOMTR-MCNC: 194 MG/DL — HIGH (ref 70–99)
GLUCOSE BLDC GLUCOMTR-MCNC: 199 MG/DL — HIGH (ref 70–99)
GLUCOSE BLDC GLUCOMTR-MCNC: 78 MG/DL — SIGNIFICANT CHANGE UP (ref 70–99)

## 2023-01-29 PROCEDURE — 99232 SBSQ HOSP IP/OBS MODERATE 35: CPT

## 2023-01-29 RX ORDER — INSULIN LISPRO 100/ML
VIAL (ML) SUBCUTANEOUS EVERY 6 HOURS
Refills: 0 | Status: DISCONTINUED | OUTPATIENT
Start: 2023-01-29 | End: 2023-03-12

## 2023-01-29 RX ADMIN — Medication 750 MILLIGRAM(S): at 08:37

## 2023-01-29 RX ADMIN — Medication 3 MILLILITER(S): at 23:56

## 2023-01-29 RX ADMIN — Medication 750 MILLIGRAM(S): at 22:03

## 2023-01-29 RX ADMIN — APIXABAN 5 MILLIGRAM(S): 2.5 TABLET, FILM COATED ORAL at 17:44

## 2023-01-29 RX ADMIN — Medication 1: at 08:29

## 2023-01-29 RX ADMIN — ROBINUL 1 MILLIGRAM(S): 0.2 INJECTION INTRAMUSCULAR; INTRAVENOUS at 05:42

## 2023-01-29 RX ADMIN — SCOPALAMINE 1 PATCH: 1 PATCH, EXTENDED RELEASE TRANSDERMAL at 07:31

## 2023-01-29 RX ADMIN — APIXABAN 5 MILLIGRAM(S): 2.5 TABLET, FILM COATED ORAL at 05:43

## 2023-01-29 RX ADMIN — LEVETIRACETAM 1500 MILLIGRAM(S): 250 TABLET, FILM COATED ORAL at 17:44

## 2023-01-29 RX ADMIN — Medication 25 MILLIGRAM(S): at 05:42

## 2023-01-29 RX ADMIN — Medication 25 MILLIGRAM(S): at 17:45

## 2023-01-29 RX ADMIN — Medication 3 MILLILITER(S): at 17:15

## 2023-01-29 RX ADMIN — SCOPALAMINE 1 PATCH: 1 PATCH, EXTENDED RELEASE TRANSDERMAL at 20:51

## 2023-01-29 RX ADMIN — Medication 1: at 17:43

## 2023-01-29 RX ADMIN — Medication 3 MILLILITER(S): at 11:08

## 2023-01-29 RX ADMIN — Medication 1 APPLICATION(S): at 17:44

## 2023-01-29 RX ADMIN — Medication 3 MILLILITER(S): at 05:50

## 2023-01-29 RX ADMIN — ROBINUL 1 MILLIGRAM(S): 0.2 INJECTION INTRAMUSCULAR; INTRAVENOUS at 17:44

## 2023-01-29 RX ADMIN — Medication 81 MILLIGRAM(S): at 11:22

## 2023-01-29 RX ADMIN — Medication 1 APPLICATION(S): at 17:45

## 2023-01-29 RX ADMIN — CHLORHEXIDINE GLUCONATE 1 APPLICATION(S): 213 SOLUTION TOPICAL at 05:41

## 2023-01-29 RX ADMIN — BUDESONIDE AND FORMOTEROL FUMARATE DIHYDRATE 2 PUFF(S): 160; 4.5 AEROSOL RESPIRATORY (INHALATION) at 05:59

## 2023-01-29 RX ADMIN — LEVETIRACETAM 1500 MILLIGRAM(S): 250 TABLET, FILM COATED ORAL at 05:42

## 2023-01-29 RX ADMIN — ATORVASTATIN CALCIUM 20 MILLIGRAM(S): 80 TABLET, FILM COATED ORAL at 22:03

## 2023-01-29 RX ADMIN — INSULIN GLARGINE 10 UNIT(S): 100 INJECTION, SOLUTION SUBCUTANEOUS at 22:17

## 2023-01-29 RX ADMIN — POLYETHYLENE GLYCOL 3350 17 GRAM(S): 17 POWDER, FOR SOLUTION ORAL at 11:22

## 2023-01-29 RX ADMIN — Medication 1 APPLICATION(S): at 17:43

## 2023-01-29 RX ADMIN — PREGABALIN 1000 MICROGRAM(S): 225 CAPSULE ORAL at 11:22

## 2023-01-29 RX ADMIN — Medication 1 APPLICATION(S): at 05:43

## 2023-01-29 RX ADMIN — SENNA PLUS 2 TABLET(S): 8.6 TABLET ORAL at 22:03

## 2023-01-29 RX ADMIN — Medication 1 APPLICATION(S): at 05:58

## 2023-01-29 RX ADMIN — BUDESONIDE AND FORMOTEROL FUMARATE DIHYDRATE 2 PUFF(S): 160; 4.5 AEROSOL RESPIRATORY (INHALATION) at 17:16

## 2023-01-29 RX ADMIN — Medication 1 APPLICATION(S): at 05:41

## 2023-01-29 RX ADMIN — Medication 1 MILLIGRAM(S): at 11:22

## 2023-01-29 NOTE — PROGRESS NOTE ADULT - ASSESSMENT
71 yo male w/ pmhx of COPD, CAD sp PCI w/ stent, CABG 2014, HF w/ PeF, 2nd degree heart block, sp PPM, HTN, DM, CKD Stage 3, CVA- lacunar infarcts admitted to ICU originally for cardiac arrest. ACLS for PEA arrest and ROSC returned after 5 minutes. Cardiac arrest possibly secondary to severe hypoglycemia from eating less and not tracking blood sugar carefully. Hospital course complicated by 1) prolonged hypoxemic respiratory failure. sp trach and peg 2) left upper extremity DVT and placed on eliquis 3) multiple infections (enteroccoal facelis cellutis, infected left hand hematoma, tracheobronchitis secondary to citrobacr 4) tonic clonic seizure - on keppra/depakoate- currently undergoing reevaluation by NEUROLOGY 5) fever of 102 on 1/19. INFECTIOUS DISEASE reconsulted and pan culture ordered     # Cardiac Arrest   - s/p ACLS w/ ROSC after 5 min   - 2nd degree heart block, s/p PPM   - ECHO Takotsubo cardiomyopathy. EF 50-55%, LVH, mild pHTN    - s/p ICU course; Hemodynamically stable now  - cont w/ Metoprolol, ASA and atorvastatin     # Acute/Chronic Respiratory failure w/ Hypoxia and Hypercapnia/ COPD   - s/p intubation; failed extubation   - Pulmonary on board   - tolerating Trach collar, vent discontinued; continue weaning as per Pulmonary   - cont w/ Symbicort, Duonebs prn     # Multiple infectious Disease   - trachobronchitis secondary to citrobacter?? - resolved  - enterococcal faecalis cellulitis - resolved   - infected left hand hematoma - s/p bedside debridement 12/13 w/ hand surgery   - monitor off abx     # Occlusive Left subclavian thrombosis   - LUE venous duplex : occlusive thrombus in the left mid subclavian vein with partial slow flow detected in the lateral subclavian vein.  - LUE arterial Duplex neg   - cont w/ Eliquis     Myoclonic Seizure   - EEG: Myoclonic seizure  - Neurology on board   - cont w/ Keppra Depakote    # HTN   - cont w/ Metoprolol     # DM2   - A1c 8.8%  - cont w/ Lantus   - cont w/ FS monitoring w/ insulin s/s coverage     # Cad s/p PCI w/ stent/ CABG  - cont with ASA and Atorvastatin     # Anemia of Chronic Disease  - h/h stable, will cont  to monitor     # Functional quadriplegic   - RITA on discharge   - supportive care    # Stage 4 decubiti sacrum wound  - s/p debridement 12/19  -cont w/ wound care as recommended     # Dysphagia/ Moderate Protein Calorie Malnutrition  - Peg tube in place   - continue with tube feeding at goal as tolerated.     # Constipation  - cont w/ senna, Miralax prn     Dvt ppx: Eliquis   Dipso: Patient has no insurance and Family in process of getting guardianship. Patient will then need placement.     Plan discussed with son at bedside

## 2023-01-29 NOTE — PROGRESS NOTE ADULT - ASSESSMENT
REVIEW OF SYMPTOMS      Able to give (reliable) ROS  NO     PHYSICAL EXAM    HEENT Unremarkable  atraumatic   RESP Fair air entry EXP prolonged    Harsh breath sound Resp distres mild   CARDIAC S1 S2 No S3     NO JVD    ABDOMEN SOFT BS PRESENT NOT DISTENDED No hepatosplenomegaly   PEDAL EDEMA present No calf tenderness  NO rash       GENERAL DATA .   GOC.  12/11/2022 full code       ALLGY.  nka                            WT. ..  12/2/2022 86  BMI. ..    12/2/2022 29                          ICU STAY.  .. 11/29-12/9  COVID.   .. 12/2/2022 scv2 (-)   .. 11/20/2022 scv2 (-)   BEST PRACTICE ISSUES.    HOB ELEVATN. Yes  DVT PPLX.    .. 1/3/2023 apixa 5.2 (vte)  12/12 l Subclav throm  ALEJO PPLX.   ..      INFN PPLX.   .. 11/25 chlorhex .12%   .. 11/14 chlorhex 2%   SP SW ANTWAN.         DIET.    ..  12/15 glucerna 1.2 1440 gt   IV fl.  ..            PROCEDURES.  .. 12/19 debridement of sacral wound   .. 12/5/2022 trach    ABGS.  1/28/2023 afeb 70 100/60   1/28/2023 tc 97%  1/6/2023 ac 16/450/.3/5 746/49/123     VS/ PO/IO/ VENT/ DRIPS.  1/29/2023 99f 60 130/80   1/29/2023 tc 35%     PATIENT PRESENTATION.  72 m doa 11/14/2022 cac    PMH  PMH CAD s/p PCI with stent 2015 and CABG 2014,   PMH  s/p medtronic PPM,   PMH CVA (lacunar infarct)    HOSPITAL COURSE   .. 1) PEA arrest with ROSC after approximately 5 min 11/14  Now communicative   .. 2) DVT 12/12 l Subclav thromS 12/15/2022 lvnx 80.2 1/3 changed to apixaban 5.2  .. 3) TRACH 12/5/2022 trach  .. 4) PEG12/5/2022 peg   .. 5) Cellulitis hand absc 12/13 mod enterococcus fecalis  staph epi 12/12-12/15/2022  cephalexin 12/15 vanco once  12/16-12/19 zosyn  .. 6) Vent weaning     PROBLEM ASSESSMENT RECOMMENDATIONS.  WEANING VENT  1/15/2023 Has been tolerating day time trach collar   .. 1/20/2023 dw son will try cpap overnight and escalate to ac if resp distress   .. 1/23/2023 dw rt will start 24 h trach collar Pt oin pulse ox and cardiac monitpor   .. 1/24/2023 tolerating 24 h trach collar   .. 1/26/2023 has been off vent x 3 d  will dc vent  TRACH WEAN.  .. 1/27/2023 case dw Dr Gonzalez   .. 1/27/2023 Plan would be to change trach to fenestrated cuffed trach to deflate the balloon and try capping next week  .. 1/27/2023 If goes into resp failure then nonfen inner cannula and vent   INFECTION.  .. w 1/17-1/18-1/20-1/21/2023 w 8.3- 7.9 - 6.8 - 7.2    .. pr 1/17-1/20/2023 (-) - (-)   .. 1/16-1/20/2023 Rocephin started by hospitalist dced   CHF   .. 11/14/2022 echo mod decr segmental lvsf apical lateral apiccal ant segment are abn takotsubo cmpthy pasp 42 .  .. Monitor for chf has chr hfref per echo   COPD   .. 12/30 symbicort 160   .. 1/7/2023 glycopyrrolate 1.2   .. 1/9/2023 ipratropium  .. 1/15/2023 scopolamine   .. continue bd ics for copd   Anemia.  .. Hb 1/12-1/14-1/19-1/20-7/21-1/25/2023       Hb 9.8- 10 -9.1- 9.8  - 9.2 - 9.5   .. target hb 7 (+)  .. monitor   sp cac   .. good neuro recovery awake alert interactive   VTE  .. 1/3/2023 apixa 5.2 (vte)      OVERALL   WEANED OFF VENT   .. 1/26/2023 has been off vent 3 d will dc vent  TRACH WEANING Being plannd  FLAILING OF LOWER EXTR started 1/16 on depakote   suggest Neuro followup 1/19/2023 myoclonic jerks improvd     TIME SPENT   Over 25 minutes aggregate care time spent on encounter; activities included   direct patient care, counseling and/or coordinating care reviewing notes, lab data/ imaging , discussion with multidisciplinary team/ patient  /family and explaining in detail risks, benefits, alternatives  of the recommendations     BRANDON CAMARILLO

## 2023-01-29 NOTE — PROGRESS NOTE ADULT - SUBJECTIVE AND OBJECTIVE BOX
Patient is a 72y old  Male who presents with a chief complaint of s/p cardiac arrest, hypoglycemia (25 Jan 2023 08:52)      INTERVAL HPI/ OVERNIGHT EVENTS: Pt was seen and examined at bedside today, No significant overnight events, pt admits to some body pain controlled with analgesics given, he denies any new complaints.      MEDICATIONS  (STANDING):  albuterol/ipratropium for Nebulization 3 milliLiter(s) Nebulizer every 6 hours  apixaban 5 milliGRAM(s) Oral every 12 hours  aspirin  chewable 81 milliGRAM(s) Oral daily  atorvastatin 20 milliGRAM(s) Oral at bedtime  bacitracin   Ointment 1 Application(s) Topical two times a day  bacitracin   Ointment 1 Application(s) Topical two times a day  budesonide 160 MICROgram(s)/formoterol 4.5 MICROgram(s) Inhaler 2 Puff(s) Inhalation two times a day  chlorhexidine 2% Cloths 1 Application(s) Topical <User Schedule>  collagenase Ointment 1 Application(s) Topical two times a day  cyanocobalamin 1000 MICROGram(s) Oral daily  dextrose 5%. 1000 milliLiter(s) (50 mL/Hr) IV Continuous <Continuous>  dextrose 5%. 1000 milliLiter(s) (100 mL/Hr) IV Continuous <Continuous>  dextrose 50% Injectable 25 Gram(s) IV Push once  dextrose 50% Injectable 12.5 Gram(s) IV Push once  dextrose 50% Injectable 25 Gram(s) IV Push once  diphenhydrAMINE Injectable 50 milliGRAM(s) IV Push once  folic acid 1 milliGRAM(s) Oral daily  glucagon  Injectable 1 milliGRAM(s) IntraMuscular once  glycopyrrolate 1 milliGRAM(s) Oral two times a day  insulin glargine Injectable (LANTUS) 10 Unit(s) SubCutaneous at bedtime  insulin lispro (ADMELOG) corrective regimen sliding scale   SubCutaneous three times a day before meals  insulin lispro (ADMELOG) corrective regimen sliding scale   SubCutaneous at bedtime  levETIRAcetam 1500 milliGRAM(s) Oral two times a day  metoprolol tartrate 25 milliGRAM(s) Oral two times a day  polyethylene glycol 3350 17 Gram(s) Oral daily  scopolamine 1 mG/72 Hr(s) Patch 1 Patch Transdermal every 72 hours  senna 2 Tablet(s) Oral at bedtime  silver sulfADIAZINE 1% Cream 1 Application(s) Topical two times a day  valproic  acid Syrup 750 milliGRAM(s) Oral <User Schedule>  vitamin A &amp; D Ointment 1 Application(s) Topical two times a day    MEDICATIONS  (PRN):  acetaminophen     Tablet .. 650 milliGRAM(s) Oral every 6 hours PRN Temp greater or equal to 38C (100.4F), Mild Pain (1 - 3)  albuterol    90 MICROgram(s) HFA Inhaler 2 Puff(s) Inhalation every 6 hours PRN Shortness of Breath and/or Wheezing  aluminum hydroxide/magnesium hydroxide/simethicone Suspension 30 milliLiter(s) Oral every 4 hours PRN Dyspepsia  dextrose Oral Gel 15 Gram(s) Oral once PRN Blood Glucose LESS THAN 70 milliGRAM(s)/deciliter        Allergies    No Known Allergies    Intolerances        REVIEW OF SYSTEMS:    Unable to examine due to [ ] Encephalopathy [ ] Advanced Dementia [ ] Expressive Aphasia [ ] Non-verbal patient    CONSTITUTIONAL: No fever, positive generalized weakness/Fatigue, No weight loss  EYES: No eye pain, visual disturbances, or discharge  ENMT:  No difficulty hearing, tinnitus, vertigo  NECK: Trach; + excessive secretion   RESPIRATORY: positive sputum/congestion, no SOB    CARDIOVASCULAR: No chest pain, palpitations, or leg swelling  GASTROINTESTINAL: No abdominal pain. No nausea, vomiting, diarrhea or constipation. No melena or hematochezia.  GENITOURINARY: No dysuria, frequency, hematuria, or incontinence  NEUROLOGICAL: No headaches, Dizziness, memory loss, loss of strength, numbness, or tremors  SKIN: No itching, burning, rashes, or lesions   MUSCULOSKELETAL: generalized boy pain    PSYCHIATRIC: No depression, anxiety, mood swings, or difficulty sleeping  HEME/LYMPH: No easy bruising, or bleeding gums      ICU Vital Signs Last 24 Hrs  T(C): 36.5 (29 Jan 2023 04:49), Max: 36.8 (28 Jan 2023 15:55)  T(F): 97.7 (29 Jan 2023 04:49), Max: 98.2 (28 Jan 2023 15:55)  HR: 71 (29 Jan 2023 06:36) (58 - 74)  BP: 135/70 (29 Jan 2023 04:49) (97/60 - 135/70)  BP(mean): --  ABP: --  ABP(mean): --  RR: 18 (29 Jan 2023 06:36) (18 - 20)  SpO2: 98% (29 Jan 2023 06:36) (97% - 100%)    O2 Parameters below as of 29 Jan 2023 06:36  Patient On (Oxygen Delivery Method): tracheostomy collar  O2 Flow (L/min): 5  O2 Concentration (%): 35            PHYSICAL EXAM:  GENERAL: NAD, obese   HEAD:  Atraumatic, Normocephalic  EYES: conjunctiva and sclera clear  ENMT: Moist mucous membranes  NECK: Trach collar; secretions   CHEST/LUNG: Clear to Auscultation bilaterally; No rales, rhonchi, wheezing, or rubs  HEART: Regular rate and rhythm; No murmurs, rubs, or gallops  ABDOMEN: Soft, Nontender, Nondistended; Bowel sounds present  EXTREMITIES:  2+ Peripheral Pulses, No clubbing, cyanosis, or edema  SKIN: Sacral wounds   NERVOUS SYSTEM:  Alert & Oriented X3, Good concentration; Motor Strength 5/5 B/L upper and lower extremities    LABS:                             CAPILLARY BLOOD GLUCOSE                  RADIOLOGY & ADDITIONAL TESTS:          Imaging Personally Reviewed:  [ ] YES  [ ] NO    Consultant(s) Notes Reviewed:  [ ] YES  [ ] NO    Care Discussed with Consultants/Other Providers [x ] YES  [ ] NO Patient is a 72y old  Male who presents with a chief complaint of s/p cardiac arrest, hypoglycemia (25 Jan 2023 08:52)      INTERVAL HPI/ OVERNIGHT EVENTS: Pt was seen and examined at bedside today, No significant overnight events, pt admits to some body pain controlled with analgesics given, he denies any new complaints. Secretions have improved     MEDICATIONS  (STANDING):  albuterol/ipratropium for Nebulization 3 milliLiter(s) Nebulizer every 6 hours  apixaban 5 milliGRAM(s) Oral every 12 hours  aspirin  chewable 81 milliGRAM(s) Oral daily  atorvastatin 20 milliGRAM(s) Oral at bedtime  bacitracin   Ointment 1 Application(s) Topical two times a day  bacitracin   Ointment 1 Application(s) Topical two times a day  budesonide 160 MICROgram(s)/formoterol 4.5 MICROgram(s) Inhaler 2 Puff(s) Inhalation two times a day  chlorhexidine 2% Cloths 1 Application(s) Topical <User Schedule>  collagenase Ointment 1 Application(s) Topical two times a day  cyanocobalamin 1000 MICROGram(s) Oral daily  dextrose 5%. 1000 milliLiter(s) (50 mL/Hr) IV Continuous <Continuous>  dextrose 5%. 1000 milliLiter(s) (100 mL/Hr) IV Continuous <Continuous>  dextrose 50% Injectable 25 Gram(s) IV Push once  dextrose 50% Injectable 12.5 Gram(s) IV Push once  dextrose 50% Injectable 25 Gram(s) IV Push once  diphenhydrAMINE Injectable 50 milliGRAM(s) IV Push once  folic acid 1 milliGRAM(s) Oral daily  glucagon  Injectable 1 milliGRAM(s) IntraMuscular once  glycopyrrolate 1 milliGRAM(s) Oral two times a day  insulin glargine Injectable (LANTUS) 10 Unit(s) SubCutaneous at bedtime  insulin lispro (ADMELOG) corrective regimen sliding scale   SubCutaneous three times a day before meals  insulin lispro (ADMELOG) corrective regimen sliding scale   SubCutaneous at bedtime  levETIRAcetam 1500 milliGRAM(s) Oral two times a day  metoprolol tartrate 25 milliGRAM(s) Oral two times a day  polyethylene glycol 3350 17 Gram(s) Oral daily  scopolamine 1 mG/72 Hr(s) Patch 1 Patch Transdermal every 72 hours  senna 2 Tablet(s) Oral at bedtime  silver sulfADIAZINE 1% Cream 1 Application(s) Topical two times a day  valproic  acid Syrup 750 milliGRAM(s) Oral <User Schedule>  vitamin A &amp; D Ointment 1 Application(s) Topical two times a day    MEDICATIONS  (PRN):  acetaminophen     Tablet .. 650 milliGRAM(s) Oral every 6 hours PRN Temp greater or equal to 38C (100.4F), Mild Pain (1 - 3)  albuterol    90 MICROgram(s) HFA Inhaler 2 Puff(s) Inhalation every 6 hours PRN Shortness of Breath and/or Wheezing  aluminum hydroxide/magnesium hydroxide/simethicone Suspension 30 milliLiter(s) Oral every 4 hours PRN Dyspepsia  dextrose Oral Gel 15 Gram(s) Oral once PRN Blood Glucose LESS THAN 70 milliGRAM(s)/deciliter        Allergies    No Known Allergies    Intolerances        REVIEW OF SYSTEMS:    Unable to examine due to [ ] Encephalopathy [ ] Advanced Dementia [ ] Expressive Aphasia [ ] Non-verbal patient    CONSTITUTIONAL: No fever, positive generalized weakness/Fatigue, No weight loss  EYES: No eye pain, visual disturbances, or discharge  ENMT:  No difficulty hearing, tinnitus, vertigo  NECK: Trach; + excessive secretion   RESPIRATORY: positive sputum/congestion, no SOB    CARDIOVASCULAR: No chest pain, palpitations, or leg swelling  GASTROINTESTINAL: No abdominal pain. No nausea, vomiting, diarrhea or constipation. No melena or hematochezia.  GENITOURINARY: No dysuria, frequency, hematuria, or incontinence  NEUROLOGICAL: No headaches, Dizziness, memory loss, loss of strength, numbness, or tremors  SKIN: No itching, burning, rashes, or lesions   MUSCULOSKELETAL: generalized boy pain    PSYCHIATRIC: No depression, anxiety, mood swings, or difficulty sleeping  HEME/LYMPH: No easy bruising, or bleeding gums      ICU Vital Signs Last 24 Hrs  T(C): 36.5 (29 Jan 2023 04:49), Max: 36.8 (28 Jan 2023 15:55)  T(F): 97.7 (29 Jan 2023 04:49), Max: 98.2 (28 Jan 2023 15:55)  HR: 71 (29 Jan 2023 06:36) (58 - 74)  BP: 135/70 (29 Jan 2023 04:49) (97/60 - 135/70)  BP(mean): --  ABP: --  ABP(mean): --  RR: 18 (29 Jan 2023 06:36) (18 - 20)  SpO2: 98% (29 Jan 2023 06:36) (97% - 100%)    O2 Parameters below as of 29 Jan 2023 06:36  Patient On (Oxygen Delivery Method): tracheostomy collar  O2 Flow (L/min): 5  O2 Concentration (%): 35            PHYSICAL EXAM:  GENERAL: NAD, obese   HEAD:  Atraumatic, Normocephalic  EYES: conjunctiva and sclera clear  ENMT: Moist mucous membranes  NECK: Trach collar  CHEST/LUNG: Clear to Auscultation bilaterally; No rales, rhonchi, wheezing, or rubs  HEART: Regular rate and rhythm; No murmurs, rubs, or gallops  ABDOMEN: Soft, Nontender, Nondistended; Bowel sounds present  EXTREMITIES:  2+ Peripheral Pulses, No clubbing, cyanosis, or edema  SKIN: Sacral wounds   NERVOUS SYSTEM:  Alert & Oriented X3, Good concentration; Motor Strength 5/5 B/L upper and lower extremities    LABS:                             CAPILLARY BLOOD GLUCOSE                  RADIOLOGY & ADDITIONAL TESTS:          Imaging Personally Reviewed:  [ ] YES  [ ] NO    Consultant(s) Notes Reviewed:  [ ] YES  [ ] NO    Care Discussed with Consultants/Other Providers [x ] YES  [ ] NO

## 2023-01-29 NOTE — PROGRESS NOTE ADULT - SUBJECTIVE AND OBJECTIVE BOX
BRANDON CAMARILLO    [facility] [address] [address]    Allergies    No Known Allergies    Intolerances        PAST MEDICAL & SURGICAL HISTORY:  HTN (hypertension)      HLD (hyperlipidemia)      Diabetes mellitus      Lacunar infarction      BPH (benign prostatic hyperplasia)      CHF (congestive heart failure)      Chronic obstructive pulmonary disease, unspecified COPD type      S/P CABG (coronary artery bypass graft)  2014          FAMILY HISTORY:      Home Medications:  aspirin 81 mg oral delayed release tablet: 1 tab(s) orally once a day (12 Jun 2020 17:35)  Liquifilm Tears preserved ophthalmic solution: 1 drop(s) to each affected eye 2 times a day (12 Jun 2020 17:35)      MEDICATIONS  (STANDING):  albuterol/ipratropium for Nebulization 3 milliLiter(s) Nebulizer every 6 hours  apixaban 5 milliGRAM(s) Oral every 12 hours  aspirin  chewable 81 milliGRAM(s) Oral daily  atorvastatin 20 milliGRAM(s) Oral at bedtime  bacitracin   Ointment 1 Application(s) Topical two times a day  bacitracin   Ointment 1 Application(s) Topical two times a day  budesonide 160 MICROgram(s)/formoterol 4.5 MICROgram(s) Inhaler 2 Puff(s) Inhalation two times a day  chlorhexidine 2% Cloths 1 Application(s) Topical <User Schedule>  collagenase Ointment 1 Application(s) Topical two times a day  cyanocobalamin 1000 MICROGram(s) Oral daily  dextrose 5%. 1000 milliLiter(s) (100 mL/Hr) IV Continuous <Continuous>  dextrose 5%. 1000 milliLiter(s) (50 mL/Hr) IV Continuous <Continuous>  dextrose 50% Injectable 25 Gram(s) IV Push once  dextrose 50% Injectable 12.5 Gram(s) IV Push once  dextrose 50% Injectable 25 Gram(s) IV Push once  diphenhydrAMINE Injectable 50 milliGRAM(s) IV Push once  folic acid 1 milliGRAM(s) Oral daily  glucagon  Injectable 1 milliGRAM(s) IntraMuscular once  glycopyrrolate 1 milliGRAM(s) Oral two times a day  insulin glargine Injectable (LANTUS) 10 Unit(s) SubCutaneous at bedtime  insulin lispro (ADMELOG) corrective regimen sliding scale   SubCutaneous three times a day before meals  insulin lispro (ADMELOG) corrective regimen sliding scale   SubCutaneous at bedtime  levETIRAcetam 1500 milliGRAM(s) Oral two times a day  metoprolol tartrate 25 milliGRAM(s) Oral two times a day  polyethylene glycol 3350 17 Gram(s) Oral daily  scopolamine 1 mG/72 Hr(s) Patch 1 Patch Transdermal every 72 hours  senna 2 Tablet(s) Oral at bedtime  silver sulfADIAZINE 1% Cream 1 Application(s) Topical two times a day  valproic  acid Syrup 750 milliGRAM(s) Oral <User Schedule>  vitamin A &amp; D Ointment 1 Application(s) Topical two times a day    MEDICATIONS  (PRN):  acetaminophen     Tablet .. 650 milliGRAM(s) Oral every 6 hours PRN Temp greater or equal to 38C (100.4F), Mild Pain (1 - 3)  albuterol    90 MICROgram(s) HFA Inhaler 2 Puff(s) Inhalation every 6 hours PRN Shortness of Breath and/or Wheezing  aluminum hydroxide/magnesium hydroxide/simethicone Suspension 30 milliLiter(s) Oral every 4 hours PRN Dyspepsia  dextrose Oral Gel 15 Gram(s) Oral once PRN Blood Glucose LESS THAN 70 milliGRAM(s)/deciliter      Diet, NPO with Tube Feed:   Tube Feeding Modality: Gastrostomy  Glucerna 1.2 Pedrito  Total Volume for 24 Hours (mL): 1440  Continuous  Until Goal Tube Feed Rate (mL per Hour): 60  Tube Feed Duration (in Hours): 24  Tube Feed Start Time: 14:30  Free Water Flush  Free Water Flush Instructions:  30 ml/hr via pump  Liquid Protein Supplement     Qty per Day:  1 (01-09-23 @ 13:45) [Active]          Vital Signs Last 24 Hrs  T(C): 36.5 (29 Jan 2023 04:49), Max: 36.8 (28 Jan 2023 15:55)  T(F): 97.7 (29 Jan 2023 04:49), Max: 98.2 (28 Jan 2023 15:55)  HR: 68 (29 Jan 2023 08:04) (58 - 74)  BP: 135/70 (29 Jan 2023 04:49) (97/60 - 135/70)  BP(mean): --  RR: 20 (29 Jan 2023 08:04) (18 - 20)  SpO2: 97% (29 Jan 2023 08:04) (97% - 100%)    Parameters below as of 29 Jan 2023 08:04  Patient On (Oxygen Delivery Method): tracheostomy collar  O2 Flow (L/min): 5  O2 Concentration (%): 35      01-28-23 @ 07:01  -  01-29-23 @ 07:00  --------------------------------------------------------  IN: 1180 mL / OUT: 200 mL / NET: 980 mL              LABS:                    WBC:      MICROBIOLOGY:  RECENT CULTURES:                  Sodium:          Hemoglobin:      Platelets:             RADIOLOGY & ADDITIONAL STUDIES:      MICROBIOLOGY:  RECENT CULTURES:

## 2023-01-30 LAB
ANION GAP SERPL CALC-SCNC: 5 MMOL/L — SIGNIFICANT CHANGE UP (ref 5–17)
BUN SERPL-MCNC: 31 MG/DL — HIGH (ref 7–23)
CALCIUM SERPL-MCNC: 9 MG/DL — SIGNIFICANT CHANGE UP (ref 8.5–10.1)
CHLORIDE SERPL-SCNC: 106 MMOL/L — SIGNIFICANT CHANGE UP (ref 96–108)
CO2 SERPL-SCNC: 33 MMOL/L — HIGH (ref 22–31)
CREAT SERPL-MCNC: 0.86 MG/DL — SIGNIFICANT CHANGE UP (ref 0.5–1.3)
EGFR: 91 ML/MIN/1.73M2 — SIGNIFICANT CHANGE UP
GLUCOSE BLDC GLUCOMTR-MCNC: 148 MG/DL — HIGH (ref 70–99)
GLUCOSE BLDC GLUCOMTR-MCNC: 170 MG/DL — HIGH (ref 70–99)
GLUCOSE BLDC GLUCOMTR-MCNC: 206 MG/DL — HIGH (ref 70–99)
GLUCOSE BLDC GLUCOMTR-MCNC: 207 MG/DL — HIGH (ref 70–99)
GLUCOSE BLDC GLUCOMTR-MCNC: 216 MG/DL — HIGH (ref 70–99)
GLUCOSE SERPL-MCNC: 154 MG/DL — HIGH (ref 70–99)
HCT VFR BLD CALC: 34.1 % — LOW (ref 39–50)
HGB BLD-MCNC: 10.7 G/DL — LOW (ref 13–17)
MAGNESIUM SERPL-MCNC: 2.4 MG/DL — SIGNIFICANT CHANGE UP (ref 1.6–2.6)
MCHC RBC-ENTMCNC: 30.3 PG — SIGNIFICANT CHANGE UP (ref 27–34)
MCHC RBC-ENTMCNC: 31.4 G/DL — LOW (ref 32–36)
MCV RBC AUTO: 96.6 FL — SIGNIFICANT CHANGE UP (ref 80–100)
NRBC # BLD: 0 /100 WBCS — SIGNIFICANT CHANGE UP (ref 0–0)
PHOSPHATE SERPL-MCNC: 3 MG/DL — SIGNIFICANT CHANGE UP (ref 2.5–4.5)
PLATELET # BLD AUTO: 241 K/UL — SIGNIFICANT CHANGE UP (ref 150–400)
POTASSIUM SERPL-MCNC: 4.3 MMOL/L — SIGNIFICANT CHANGE UP (ref 3.5–5.3)
POTASSIUM SERPL-SCNC: 4.3 MMOL/L — SIGNIFICANT CHANGE UP (ref 3.5–5.3)
RBC # BLD: 3.53 M/UL — LOW (ref 4.2–5.8)
RBC # FLD: 14.4 % — SIGNIFICANT CHANGE UP (ref 10.3–14.5)
SODIUM SERPL-SCNC: 144 MMOL/L — SIGNIFICANT CHANGE UP (ref 135–145)
WBC # BLD: 8.2 K/UL — SIGNIFICANT CHANGE UP (ref 3.8–10.5)
WBC # FLD AUTO: 8.2 K/UL — SIGNIFICANT CHANGE UP (ref 3.8–10.5)

## 2023-01-30 PROCEDURE — 71045 X-RAY EXAM CHEST 1 VIEW: CPT | Mod: 26

## 2023-01-30 PROCEDURE — 99232 SBSQ HOSP IP/OBS MODERATE 35: CPT

## 2023-01-30 PROCEDURE — 99233 SBSQ HOSP IP/OBS HIGH 50: CPT | Mod: 25

## 2023-01-30 PROCEDURE — 31502 CHANGE OF WINDPIPE AIRWAY: CPT

## 2023-01-30 RX ADMIN — SCOPALAMINE 1 PATCH: 1 PATCH, EXTENDED RELEASE TRANSDERMAL at 21:28

## 2023-01-30 RX ADMIN — ROBINUL 1 MILLIGRAM(S): 0.2 INJECTION INTRAMUSCULAR; INTRAVENOUS at 05:42

## 2023-01-30 RX ADMIN — BUDESONIDE AND FORMOTEROL FUMARATE DIHYDRATE 2 PUFF(S): 160; 4.5 AEROSOL RESPIRATORY (INHALATION) at 17:23

## 2023-01-30 RX ADMIN — Medication 2: at 23:13

## 2023-01-30 RX ADMIN — Medication 3 MILLILITER(S): at 11:02

## 2023-01-30 RX ADMIN — Medication 1 APPLICATION(S): at 18:15

## 2023-01-30 RX ADMIN — Medication 750 MILLIGRAM(S): at 21:33

## 2023-01-30 RX ADMIN — Medication 750 MILLIGRAM(S): at 08:08

## 2023-01-30 RX ADMIN — Medication 2: at 00:08

## 2023-01-30 RX ADMIN — PREGABALIN 1000 MICROGRAM(S): 225 CAPSULE ORAL at 13:33

## 2023-01-30 RX ADMIN — APIXABAN 5 MILLIGRAM(S): 2.5 TABLET, FILM COATED ORAL at 18:10

## 2023-01-30 RX ADMIN — Medication 1 APPLICATION(S): at 05:39

## 2023-01-30 RX ADMIN — Medication 1 APPLICATION(S): at 06:40

## 2023-01-30 RX ADMIN — Medication 1 APPLICATION(S): at 18:14

## 2023-01-30 RX ADMIN — Medication 1 APPLICATION(S): at 05:40

## 2023-01-30 RX ADMIN — BUDESONIDE AND FORMOTEROL FUMARATE DIHYDRATE 2 PUFF(S): 160; 4.5 AEROSOL RESPIRATORY (INHALATION) at 05:15

## 2023-01-30 RX ADMIN — SCOPALAMINE 1 PATCH: 1 PATCH, EXTENDED RELEASE TRANSDERMAL at 07:38

## 2023-01-30 RX ADMIN — Medication 1 APPLICATION(S): at 05:41

## 2023-01-30 RX ADMIN — ATORVASTATIN CALCIUM 20 MILLIGRAM(S): 80 TABLET, FILM COATED ORAL at 21:38

## 2023-01-30 RX ADMIN — ROBINUL 1 MILLIGRAM(S): 0.2 INJECTION INTRAMUSCULAR; INTRAVENOUS at 18:10

## 2023-01-30 RX ADMIN — SENNA PLUS 2 TABLET(S): 8.6 TABLET ORAL at 21:34

## 2023-01-30 RX ADMIN — Medication 1 APPLICATION(S): at 17:54

## 2023-01-30 RX ADMIN — Medication 1 MILLIGRAM(S): at 12:18

## 2023-01-30 RX ADMIN — CHLORHEXIDINE GLUCONATE 1 APPLICATION(S): 213 SOLUTION TOPICAL at 05:41

## 2023-01-30 RX ADMIN — Medication 25 MILLIGRAM(S): at 05:42

## 2023-01-30 RX ADMIN — Medication 1 APPLICATION(S): at 18:06

## 2023-01-30 RX ADMIN — LEVETIRACETAM 1500 MILLIGRAM(S): 250 TABLET, FILM COATED ORAL at 05:41

## 2023-01-30 RX ADMIN — LEVETIRACETAM 1500 MILLIGRAM(S): 250 TABLET, FILM COATED ORAL at 18:11

## 2023-01-30 RX ADMIN — SCOPALAMINE 1 PATCH: 1 PATCH, EXTENDED RELEASE TRANSDERMAL at 21:35

## 2023-01-30 RX ADMIN — Medication 3 MILLILITER(S): at 05:14

## 2023-01-30 RX ADMIN — INSULIN GLARGINE 10 UNIT(S): 100 INJECTION, SOLUTION SUBCUTANEOUS at 23:12

## 2023-01-30 RX ADMIN — Medication 2: at 12:21

## 2023-01-30 RX ADMIN — Medication 25 MILLIGRAM(S): at 18:11

## 2023-01-30 RX ADMIN — APIXABAN 5 MILLIGRAM(S): 2.5 TABLET, FILM COATED ORAL at 05:42

## 2023-01-30 RX ADMIN — Medication 3 MILLILITER(S): at 17:22

## 2023-01-30 RX ADMIN — SCOPALAMINE 1 PATCH: 1 PATCH, EXTENDED RELEASE TRANSDERMAL at 20:26

## 2023-01-30 RX ADMIN — Medication 81 MILLIGRAM(S): at 12:19

## 2023-01-30 RX ADMIN — Medication 1: at 18:08

## 2023-01-30 NOTE — CHART NOTE - NSCHARTNOTEFT_GEN_A_CORE
24 hr events  seen earlier this afternoon and evening  called to bedside for surgical assistance  surgery performing trach change  had #6 cuffed trach and unable to change to #6 cuffed fenestrated trach  difficulty passing trach past stoma  size 4 cuffed fenestrated inserted without difficulty  copious clear secretions coughing up and coming from trach post trach change    ROS  [x] unable to obtain due to trach status    MEDICATIONS  (STANDING):  apixaban 5 milliGRAM(s) Oral every 12 hours  aspirin  chewable 81 milliGRAM(s) Oral daily  atorvastatin 20 milliGRAM(s) Oral at bedtime  bacitracin   Ointment 1 Application(s) Topical two times a day  bacitracin   Ointment 1 Application(s) Topical two times a day  budesonide 160 MICROgram(s)/formoterol 4.5 MICROgram(s) Inhaler 2 Puff(s) Inhalation two times a day  chlorhexidine 2% Cloths 1 Application(s) Topical <User Schedule>  collagenase Ointment 1 Application(s) Topical two times a day  cyanocobalamin 1000 MICROGram(s) Oral daily  dextrose 5%. 1000 milliLiter(s) (100 mL/Hr) IV Continuous <Continuous>  dextrose 5%. 1000 milliLiter(s) (50 mL/Hr) IV Continuous <Continuous>  dextrose 50% Injectable 25 Gram(s) IV Push once  dextrose 50% Injectable 12.5 Gram(s) IV Push once  dextrose 50% Injectable 25 Gram(s) IV Push once  diphenhydrAMINE Injectable 50 milliGRAM(s) IV Push once  folic acid 1 milliGRAM(s) Oral daily  glucagon  Injectable 1 milliGRAM(s) IntraMuscular once  glycopyrrolate 1 milliGRAM(s) Oral two times a day  insulin glargine Injectable (LANTUS) 10 Unit(s) SubCutaneous at bedtime  insulin lispro (ADMELOG) corrective regimen sliding scale   SubCutaneous every 6 hours  levETIRAcetam 1500 milliGRAM(s) Oral two times a day  metoprolol tartrate 25 milliGRAM(s) Oral two times a day  polyethylene glycol 3350 17 Gram(s) Oral daily  scopolamine 1 mG/72 Hr(s) Patch 1 Patch Transdermal every 72 hours  senna 2 Tablet(s) Oral at bedtime  silver sulfADIAZINE 1% Cream 1 Application(s) Topical two times a day  valproic  acid Syrup 750 milliGRAM(s) Oral <User Schedule>  vitamin A &amp; D Ointment 1 Application(s) Topical two times a day    MEDICATIONS  (PRN):  acetaminophen     Tablet .. 650 milliGRAM(s) Oral every 6 hours PRN Temp greater or equal to 38C (100.4F), Mild Pain (1 - 3)  albuterol/ipratropium for Nebulization 3 milliLiter(s) Nebulizer every 6 hours PRN Shortness of Breath and/or Wheezing  aluminum hydroxide/magnesium hydroxide/simethicone Suspension 30 milliLiter(s) Oral every 4 hours PRN Dyspepsia  dextrose Oral Gel 15 Gram(s) Oral once PRN Blood Glucose LESS THAN 70 milliGRAM(s)/deciliter      LABS                                             10.7   8.20  )-----------( 241      ( 30 Jan 2023 05:40 )             34.1         01-30    144  |  106  |  31<H>  ----------------------------<  154<H>  4.3   |  33<H>  |  0.86    Ca    9.0      30 Jan 2023 05:40  Phos  3.0     01-30  Mg     2.4     01-30                VITALS  T(C): 37.6 (30 Jan 2023 10:46), Max: 37.6 (30 Jan 2023 10:46)  T(F): 99.7 (30 Jan 2023 10:46), Max: 99.7 (30 Jan 2023 10:46)  HR: 68 (30 Jan 2023 10:46) (62 - 115)  BP: 123/67 (30 Jan 2023 10:46) (113/71 - 132/88)  RR: 18 (30 Jan 2023 09:16) (16 - 20)  SpO2: 99% (30 Jan 2023 10:46) (96% - 100%)    O2 Parameters below as of 30 Jan 2023 10:46  Patient On (Oxygen Delivery Method): tracheostomy collar        EXAM  GEN: NAD, coughing, comfortable in bed  HEENT: NC/AT, copious clear trach secretions, trach in place, no bleeding  CV: RRR  RESP: bilateral course breath sounds, no wheeze, no rales  ABD: soft, NT, ND, + PEG + BS  EXT: no edema/cyanosis  NEURO: awake, alert, following simple commands        72M PMH CAD s/p PCI with stent 2015 and CABG 2014, chronic diastolic heart failure (EF 50%), 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  Post arrest noted to have generalized tonic-clonic seizures confirmed on EEG. Started on antiepileptics. Hospital course complicated by failure to wean s/p trach and peg 12/5/22, left upper extremity DVT initiated on eliquis, fevers with multiple infections due to enterococcal faecalis cellulitis, infected left hand hematoma s/p debridement 12/13, citrobacter and enterobacter cloacae tracheobronchitis, ecoli UTI. Trach changed and down sized from shiley 6 to #4 fenestrated cuffed trach today    DX: cardiac arrest, respiratory arrest / acute hypoxic respiratory failure requiring intubation, failure to wean s/p trach and PEG, new onset tonic clonic seizure, hypoglycemia with underlying DM, ELMER on CKD,with ATN, shock liver (resolved), takosubo cardiomyopathy, left arm DVT, L hand hematoma and cellulitis, tracheobronchitis, UTI    - trach changed today  - unable to pass new size 6 past stoma, thus trach downsized to #4 fenestrated cuffed trach  - if pt remains off mechanical vent, no need for cuffed trach  - will hold capping trial held in setting of copious secretions  - has been off mechanical vent support since 1/23  - cont with trach collar around the clock  - on scopolamine patch for secretions  - suction as needed  - remains on AC for LUE DVT  - pt awake, alert  - continue PT/OT  - cont PEG feeds  - son at bedside updated on condition and plan of care  - frequent bedside visits this afternoon for follow up s/p trach change, respiratory status stable

## 2023-01-30 NOTE — PROGRESS NOTE ADULT - ASSESSMENT
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 ACLS for PEA arrest and ROSC returned after 5 minutes. Cardiac arrest possibly secondary to severe hypoglycemia from eating less and not tracking blood sugar carefully. Hospital course complicated by 1) prolonged hypoxemic respiratory failure. sp trach and peg 2) left upper extremity DVT and placed on eliquis 3) multiple infections (enteroccoal facelis cellutis, infected left hand hematoma, tracheobronchitis secondary to citrobacr 4) tonic clonic seizure - on keppra/depakoate- currently undergoing reevaluation by NEUROLOGY 5) fever of 102 on 1/19. INFECTIOUS DISEASE reconsulted and pan culture ordered     # Cardiac Arrest   - s/p ACLS w/ ROSC after 5 min   - 2nd degree heart block, s/p PPM   - ECHO Takotsubo cardiomyopathy. EF 50-55%, LVH, mild pHTN    - s/p ICU course; Hemodynamically stable now  - cont w/ Metoprolol, ASA and atorvastatin     # Acute/Chronic Respiratory failure w/ Hypoxia and Hypercapnia/ COPD   - s/p intubation; failed extubation   - Pulmonary on board   - tolerating Trach collar, vent discontinued; continue weaning as per Pulmonary   - cont w/ Symbicort Duonebs prn  - Surgery called for Trach change to fenestrated cuff to deflate ballon     # Multiple infectious Disease   - trachobronchitis secondary to citrobacter?? - resolved  - enterococcal faecalis cellulitis - resolved   - infected left hand hematoma - s/p bedside debridement 12/13 w/ hand surgery   - monitor off abx     # Occlusive Left subclavian thrombosis   - LUE venous duplex : occlusive thrombus in the left mid subclavian vein with partial slow flow detected in the lateral subclavian vein.  - LUE arterial Duplex neg   - cont w/ Eliquis     Myoclonic Seizure   - EEG: Myoclonic seizure  - Neurology on board   - cont w/ Keppra Depakote    # HTN   - cont w/ Metoprolol     # DM2   - A1c 8.8%  - cont w/ Lantus   - cont w/ FS monitoring w/ insulin s/s coverage     # Cad s/p PCI w/ stent/ CABG  - cont with ASA and Atorvastatin     # Anemia of Chronic Disease  - h/h stable, will cont  to monitor     # Functional quadriplegic   - RITA on discharge   - supportive care    # Stage 4 decubiti sacrum wound  - s/p debridement 12/19  -cont w/ wound care as recommended     # Dysphagia/ Moderate Protein Calorie Malnutrition  - Peg tube in place   - continue with tube feeding at goal as tolerated.     # Constipation  - cont w/ senna, Miralax prn     Dvt ppx: Eliquis   Dipso: Patient has no insurance and Family in process of getting guardianship. Patient will then need placement.     Plan discussed with son at bedside

## 2023-01-30 NOTE — CHART NOTE - NSCHARTNOTEFT_GEN_A_CORE
Patient underwent a tracheostomy placement (#6 cuffed, non-fenestrated) on 12/5/22. Has been tolerating trach collar for the past several days without ventilatory support.     Asked by primary team to exchange existing trach to a fenestrated tracheostomy for capping trials.     Numerous attempts to place #6 cuffed, fenestrated tracheostomy over a Bougie without success. #4 non-cuffed, fenestrated tracheostomy placed with assistance from Dr. Barrett.     - Will check status later today to see if patient a candidate for capping of #4 trach.   - Old tracheostomy with obturator at bedside. Patient underwent a tracheostomy placement (#6 cuffed, non-fenestrated) on 12/5/22. Has been tolerating trach collar for the past several days without ventilatory support.     Asked by primary team to exchange existing trach to a fenestrated tracheostomy for capping trials.     Numerous attempts to place #6 cuffed, fenestrated tracheostomy over a Bougie without success. #4 non-cuffed, fenestrated tracheostomy placed with assistance from Dr. Barrett.     - Possible candidate later today for capping of #4 trach.   - Old tracheostomy with obturator at bedside.

## 2023-01-30 NOTE — PROGRESS NOTE ADULT - SUBJECTIVE AND OBJECTIVE BOX
Patient is a 72y old  Male who presents with a chief complaint of s/p cardiac arrest, hypoglycemia (25 Jan 2023 08:52)      INTERVAL HPI/ OVERNIGHT EVENTS: Pt was seen and examined at bedside today, No significant overnight events, pt continues to have excess secretions but much better, he denies any new complaints.    MEDICATIONS  (STANDING):  albuterol/ipratropium for Nebulization 3 milliLiter(s) Nebulizer every 6 hours  apixaban 5 milliGRAM(s) Oral every 12 hours  aspirin  chewable 81 milliGRAM(s) Oral daily  atorvastatin 20 milliGRAM(s) Oral at bedtime  bacitracin   Ointment 1 Application(s) Topical two times a day  bacitracin   Ointment 1 Application(s) Topical two times a day  budesonide 160 MICROgram(s)/formoterol 4.5 MICROgram(s) Inhaler 2 Puff(s) Inhalation two times a day  chlorhexidine 2% Cloths 1 Application(s) Topical <User Schedule>  collagenase Ointment 1 Application(s) Topical two times a day  cyanocobalamin 1000 MICROGram(s) Oral daily  dextrose 5%. 1000 milliLiter(s) (50 mL/Hr) IV Continuous <Continuous>  dextrose 5%. 1000 milliLiter(s) (100 mL/Hr) IV Continuous <Continuous>  dextrose 50% Injectable 25 Gram(s) IV Push once  dextrose 50% Injectable 12.5 Gram(s) IV Push once  dextrose 50% Injectable 25 Gram(s) IV Push once  diphenhydrAMINE Injectable 50 milliGRAM(s) IV Push once  folic acid 1 milliGRAM(s) Oral daily  glucagon  Injectable 1 milliGRAM(s) IntraMuscular once  glycopyrrolate 1 milliGRAM(s) Oral two times a day  insulin glargine Injectable (LANTUS) 10 Unit(s) SubCutaneous at bedtime  insulin lispro (ADMELOG) corrective regimen sliding scale   SubCutaneous every 6 hours  levETIRAcetam 1500 milliGRAM(s) Oral two times a day  metoprolol tartrate 25 milliGRAM(s) Oral two times a day  polyethylene glycol 3350 17 Gram(s) Oral daily  scopolamine 1 mG/72 Hr(s) Patch 1 Patch Transdermal every 72 hours  senna 2 Tablet(s) Oral at bedtime  silver sulfADIAZINE 1% Cream 1 Application(s) Topical two times a day  valproic  acid Syrup 750 milliGRAM(s) Oral <User Schedule>  vitamin A &amp; D Ointment 1 Application(s) Topical two times a day    MEDICATIONS  (PRN):  acetaminophen     Tablet .. 650 milliGRAM(s) Oral every 6 hours PRN Temp greater or equal to 38C (100.4F), Mild Pain (1 - 3)  albuterol    90 MICROgram(s) HFA Inhaler 2 Puff(s) Inhalation every 6 hours PRN Shortness of Breath and/or Wheezing  aluminum hydroxide/magnesium hydroxide/simethicone Suspension 30 milliLiter(s) Oral every 4 hours PRN Dyspepsia  dextrose Oral Gel 15 Gram(s) Oral once PRN Blood Glucose LESS THAN 70 milliGRAM(s)/deciliter        Allergies    No Known Allergies    Intolerances        REVIEW OF SYSTEMS:    Unable to examine due to [ ] Encephalopathy [ ] Advanced Dementia [ ] Expressive Aphasia [ ] Non-verbal patient    CONSTITUTIONAL: No fever, positive generalized weakness/Fatigue, No weight loss  EYES: No eye pain, visual disturbances, or discharge  ENMT:  No difficulty hearing, tinnitus, vertigo  NECK: Trach; + excessive secretion   RESPIRATORY: positive sputum/congestion, no SOB    CARDIOVASCULAR: No chest pain, palpitations, or leg swelling  GASTROINTESTINAL: No abdominal pain. No nausea, vomiting, diarrhea or constipation. No melena or hematochezia.  GENITOURINARY: No dysuria, frequency, hematuria, or incontinence  NEUROLOGICAL: No headaches, Dizziness, memory loss, loss of strength, numbness, or tremors  SKIN: No itching, burning, rashes, or lesions   MUSCULOSKELETAL: generalized boy pain    PSYCHIATRIC: No depression, anxiety, mood swings, or difficulty sleeping  HEME/LYMPH: No easy bruising, or bleeding gums      ICU Vital Signs Last 24 Hrs  T(C): 37.6 (30 Jan 2023 10:46), Max: 37.6 (30 Jan 2023 10:46)  T(F): 99.7 (30 Jan 2023 10:46), Max: 99.7 (30 Jan 2023 10:46)  HR: 68 (30 Jan 2023 10:46) (62 - 115)  BP: 123/67 (30 Jan 2023 10:46) (113/71 - 132/88)  BP(mean): --  ABP: --  ABP(mean): --  RR: 18 (30 Jan 2023 09:16) (16 - 20)  SpO2: 99% (30 Jan 2023 10:46) (96% - 100%)    O2 Parameters below as of 30 Jan 2023 10:46  Patient On (Oxygen Delivery Method): tracheostomy collar          PHYSICAL EXAM:  GENERAL: NAD  HEAD:  Atraumatic, Normocephalic  EYES: conjunctiva and sclera clear  ENMT: Moist mucous membranes  NECK: Trach collar +secretions   CHEST/LUNG: Clear to Auscultation bilaterally; No rales, rhonchi, wheezing, or rubs  HEART: Regular rate and rhythm; No murmurs, rubs, or gallops  ABDOMEN: Soft, Nontender, Nondistended; Bowel sounds present  EXTREMITIES:  2+ Peripheral Pulses, No clubbing, cyanosis, or edema  SKIN: Stage 4 Sacral wound   NERVOUS SYSTEM:  Alert & Oriented X3, Good concentration; generalized weakness     LABS:                                                             10.7   8.20  )-----------( 241      ( 30 Jan 2023 05:40 )             34.1     01-30    144  |  106  |  31<H>  ----------------------------<  154<H>  4.3   |  33<H>  |  0.86    Ca    9.0      30 Jan 2023 05:40  Phos  3.0     01-30  Mg     2.4     01-30                CAPILLARY BLOOD GLUCOSE                  RADIOLOGY & ADDITIONAL TESTS:          Imaging Personally Reviewed:  [ ] YES  [ ] NO    Consultant(s) Notes Reviewed:  [ ] YES  [ ] NO    Care Discussed with Consultants/Other Providers [x ] YES  [ ] NO

## 2023-01-31 LAB
GLUCOSE BLDC GLUCOMTR-MCNC: 138 MG/DL — HIGH (ref 70–99)
GLUCOSE BLDC GLUCOMTR-MCNC: 175 MG/DL — HIGH (ref 70–99)
GLUCOSE BLDC GLUCOMTR-MCNC: 192 MG/DL — HIGH (ref 70–99)
GLUCOSE BLDC GLUCOMTR-MCNC: 314 MG/DL — HIGH (ref 70–99)

## 2023-01-31 PROCEDURE — 99232 SBSQ HOSP IP/OBS MODERATE 35: CPT

## 2023-01-31 RX ADMIN — Medication 3 MILLILITER(S): at 12:03

## 2023-01-31 RX ADMIN — Medication 1: at 05:51

## 2023-01-31 RX ADMIN — Medication 25 MILLIGRAM(S): at 17:50

## 2023-01-31 RX ADMIN — Medication 25 MILLIGRAM(S): at 05:46

## 2023-01-31 RX ADMIN — INSULIN GLARGINE 10 UNIT(S): 100 INJECTION, SOLUTION SUBCUTANEOUS at 23:29

## 2023-01-31 RX ADMIN — Medication 3 MILLILITER(S): at 05:27

## 2023-01-31 RX ADMIN — Medication 1 APPLICATION(S): at 17:58

## 2023-01-31 RX ADMIN — Medication 1 APPLICATION(S): at 05:27

## 2023-01-31 RX ADMIN — ATORVASTATIN CALCIUM 20 MILLIGRAM(S): 80 TABLET, FILM COATED ORAL at 21:16

## 2023-01-31 RX ADMIN — Medication 3 MILLILITER(S): at 17:15

## 2023-01-31 RX ADMIN — SCOPALAMINE 1 PATCH: 1 PATCH, EXTENDED RELEASE TRANSDERMAL at 19:38

## 2023-01-31 RX ADMIN — Medication 3 MILLILITER(S): at 00:32

## 2023-01-31 RX ADMIN — CHLORHEXIDINE GLUCONATE 1 APPLICATION(S): 213 SOLUTION TOPICAL at 05:11

## 2023-01-31 RX ADMIN — LEVETIRACETAM 1500 MILLIGRAM(S): 250 TABLET, FILM COATED ORAL at 17:50

## 2023-01-31 RX ADMIN — Medication 750 MILLIGRAM(S): at 09:25

## 2023-01-31 RX ADMIN — Medication 4: at 12:23

## 2023-01-31 RX ADMIN — Medication 1 APPLICATION(S): at 17:51

## 2023-01-31 RX ADMIN — BUDESONIDE AND FORMOTEROL FUMARATE DIHYDRATE 2 PUFF(S): 160; 4.5 AEROSOL RESPIRATORY (INHALATION) at 17:15

## 2023-01-31 RX ADMIN — Medication 1 APPLICATION(S): at 07:09

## 2023-01-31 RX ADMIN — ROBINUL 1 MILLIGRAM(S): 0.2 INJECTION INTRAMUSCULAR; INTRAVENOUS at 17:50

## 2023-01-31 RX ADMIN — APIXABAN 5 MILLIGRAM(S): 2.5 TABLET, FILM COATED ORAL at 17:49

## 2023-01-31 RX ADMIN — Medication 1 APPLICATION(S): at 05:11

## 2023-01-31 RX ADMIN — BUDESONIDE AND FORMOTEROL FUMARATE DIHYDRATE 2 PUFF(S): 160; 4.5 AEROSOL RESPIRATORY (INHALATION) at 07:22

## 2023-01-31 RX ADMIN — SCOPALAMINE 1 PATCH: 1 PATCH, EXTENDED RELEASE TRANSDERMAL at 07:13

## 2023-01-31 RX ADMIN — Medication 1 MILLIGRAM(S): at 12:23

## 2023-01-31 RX ADMIN — ROBINUL 1 MILLIGRAM(S): 0.2 INJECTION INTRAMUSCULAR; INTRAVENOUS at 05:12

## 2023-01-31 RX ADMIN — Medication 1 APPLICATION(S): at 05:45

## 2023-01-31 RX ADMIN — Medication 1: at 23:37

## 2023-01-31 RX ADMIN — Medication 1 APPLICATION(S): at 17:57

## 2023-01-31 RX ADMIN — Medication 81 MILLIGRAM(S): at 12:23

## 2023-01-31 RX ADMIN — SENNA PLUS 2 TABLET(S): 8.6 TABLET ORAL at 21:16

## 2023-01-31 RX ADMIN — Medication 750 MILLIGRAM(S): at 21:15

## 2023-01-31 RX ADMIN — PREGABALIN 1000 MICROGRAM(S): 225 CAPSULE ORAL at 12:23

## 2023-01-31 RX ADMIN — APIXABAN 5 MILLIGRAM(S): 2.5 TABLET, FILM COATED ORAL at 05:11

## 2023-01-31 RX ADMIN — LEVETIRACETAM 1500 MILLIGRAM(S): 250 TABLET, FILM COATED ORAL at 05:11

## 2023-01-31 NOTE — PROGRESS NOTE ADULT - SUBJECTIVE AND OBJECTIVE BOX
Patient is a 72y old  Male who presents with a chief complaint of s/p cardiac arrest, hypoglycemia (25 Jan 2023 08:52)      INTERVAL HPI/ OVERNIGHT EVENTS: Pt was seen and examined at bedside today, s/p replacement of Trach w/ #4 fenestrated,  pt denies any new complaints.    MEDICATIONS  (STANDING):  albuterol/ipratropium for Nebulization 3 milliLiter(s) Nebulizer every 6 hours  apixaban 5 milliGRAM(s) Oral every 12 hours  aspirin  chewable 81 milliGRAM(s) Oral daily  atorvastatin 20 milliGRAM(s) Oral at bedtime  bacitracin   Ointment 1 Application(s) Topical two times a day  bacitracin   Ointment 1 Application(s) Topical two times a day  budesonide 160 MICROgram(s)/formoterol 4.5 MICROgram(s) Inhaler 2 Puff(s) Inhalation two times a day  chlorhexidine 2% Cloths 1 Application(s) Topical <User Schedule>  collagenase Ointment 1 Application(s) Topical two times a day  cyanocobalamin 1000 MICROGram(s) Oral daily  dextrose 5%. 1000 milliLiter(s) (50 mL/Hr) IV Continuous <Continuous>  dextrose 5%. 1000 milliLiter(s) (100 mL/Hr) IV Continuous <Continuous>  dextrose 50% Injectable 25 Gram(s) IV Push once  dextrose 50% Injectable 12.5 Gram(s) IV Push once  dextrose 50% Injectable 25 Gram(s) IV Push once  diphenhydrAMINE Injectable 50 milliGRAM(s) IV Push once  folic acid 1 milliGRAM(s) Oral daily  glucagon  Injectable 1 milliGRAM(s) IntraMuscular once  glycopyrrolate 1 milliGRAM(s) Oral two times a day  insulin glargine Injectable (LANTUS) 10 Unit(s) SubCutaneous at bedtime  insulin lispro (ADMELOG) corrective regimen sliding scale   SubCutaneous every 6 hours  levETIRAcetam 1500 milliGRAM(s) Oral two times a day  metoprolol tartrate 25 milliGRAM(s) Oral two times a day  polyethylene glycol 3350 17 Gram(s) Oral daily  scopolamine 1 mG/72 Hr(s) Patch 1 Patch Transdermal every 72 hours  senna 2 Tablet(s) Oral at bedtime  silver sulfADIAZINE 1% Cream 1 Application(s) Topical two times a day  valproic  acid Syrup 750 milliGRAM(s) Oral <User Schedule>  vitamin A &amp; D Ointment 1 Application(s) Topical two times a day    MEDICATIONS  (PRN):  acetaminophen     Tablet .. 650 milliGRAM(s) Oral every 6 hours PRN Temp greater or equal to 38C (100.4F), Mild Pain (1 - 3)  albuterol    90 MICROgram(s) HFA Inhaler 2 Puff(s) Inhalation every 6 hours PRN Shortness of Breath and/or Wheezing  aluminum hydroxide/magnesium hydroxide/simethicone Suspension 30 milliLiter(s) Oral every 4 hours PRN Dyspepsia  dextrose Oral Gel 15 Gram(s) Oral once PRN Blood Glucose LESS THAN 70 milliGRAM(s)/deciliter      Allergies    No Known Allergies    Intolerances        REVIEW OF SYSTEMS:    Unable to examine due to [ ] Encephalopathy [ ] Advanced Dementia [ ] Expressive Aphasia [ ] Non-verbal patient    CONSTITUTIONAL: No fever, positive generalized weakness/Fatigue, No weight loss  EYES: No eye pain, visual disturbances, or discharge  ENMT:  No difficulty hearing, tinnitus, vertigo  NECK: Trach; + excessive secretion   RESPIRATORY: positive sputum/congestion, no SOB    CARDIOVASCULAR: No chest pain, palpitations, or leg swelling  GASTROINTESTINAL: No abdominal pain. No nausea, vomiting, diarrhea or constipation. No melena or hematochezia.  GENITOURINARY: No dysuria, frequency, hematuria, or incontinence  NEUROLOGICAL: No headaches, Dizziness, memory loss, loss of strength, numbness, or tremors  SKIN: No itching, burning, rashes, or lesions   MUSCULOSKELETAL: generalized intermittent body pain    PSYCHIATRIC: No depression, anxiety, mood swings, or difficulty sleeping  HEME/LYMPH: No easy bruising, or bleeding gums      ICU Vital Signs Last 24 Hrs  T(C): 37.1 (31 Jan 2023 17:42), Max: 37.7 (31 Jan 2023 10:22)  T(F): 98.8 (31 Jan 2023 17:42), Max: 99.9 (31 Jan 2023 10:22)  HR: 62 (31 Jan 2023 17:42) (62 - 82)  BP: 116/61 (31 Jan 2023 17:42) (116/61 - 125/81)  BP(mean): 80 (31 Jan 2023 17:42) (80 - 80)  ABP: --  ABP(mean): --  RR: 18 (31 Jan 2023 17:42) (18 - 18)  SpO2: 98% (31 Jan 2023 17:42) (95% - 100%)    O2 Parameters below as of 31 Jan 2023 17:25  Patient On (Oxygen Delivery Method): tracheostomy collar        PHYSICAL EXAM:  GENERAL: NAD  HEAD:  Atraumatic, Normocephalic  EYES: conjunctiva and sclera clear  ENMT: Moist mucous membranes  NECK: Trach collar  CHEST/LUNG: Clear to Auscultation bilaterally; No rales, rhonchi, wheezing, or rubs  HEART: Regular rate and rhythm; No murmurs, rubs, or gallops  ABDOMEN: Soft, Nontender, Nondistended; Bowel sounds present  EXTREMITIES:  2+ Peripheral Pulses, No clubbing, cyanosis, or edema  SKIN: Stage 4 Sacral wound   NERVOUS SYSTEM:  Alert & Oriented X3, Good concentration; generalized weakness     LABS:                                                                                   10.7   8.20  )-----------( 241      ( 30 Jan 2023 05:40 )             34.1     01-30    144  |  106  |  31<H>  ----------------------------<  154<H>  4.3   |  33<H>  |  0.86    Ca    9.0      30 Jan 2023 05:40  Phos  3.0     01-30  Mg     2.4     01-30        CAPILLARY BLOOD GLUCOSE                  RADIOLOGY & ADDITIONAL TESTS:          Imaging Personally Reviewed:  [ ] YES  [ ] NO    Consultant(s) Notes Reviewed:  [ ] YES  [ ] NO    Care Discussed with Consultants/Other Providers [x ] YES  [ ] NO

## 2023-01-31 NOTE — PROGRESS NOTE ADULT - ASSESSMENT
71 yo male w/ pmhx of COPD, CAD sp PCI w/ stent, CABG 2014, HF w/ PeF, 2nd degree heart block, sp PPM, HTN, DM, CKD Stage 3, CVA- lacunar infarcts admitted to ICU originally for cardiac arrest. ACLS for PEA arrest and ROSC returned after 5 minutes. Cardiac arrest possibly secondary to severe hypoglycemia from eating less and not tracking blood sugar carefully. Hospital course complicated by 1) prolonged hypoxemic respiratory failure. sp trach and peg 2) left upper extremity DVT and placed on eliquis 3) multiple infections (enteroccoal facelis cellutis, infected left hand hematoma, tracheobronchitis secondary to citrobacr 4) tonic clonic seizure - on keppra/depakoate- currently undergoing reevaluation by NEUROLOGY 5) fever of 102 on 1/19. INFECTIOUS DISEASE reconsulted and pan culture ordered     # Cardiac Arrest   - s/p ACLS w/ ROSC after 5 min   - 2nd degree heart block, s/p PPM   - ECHO Takotsubo cardiomyopathy. EF 50-55%, LVH, mild pHTN    - s/p ICU course; Hemodynamically stable now  - cont w/ Metoprolol, ASA and atorvastatin     # Acute/Chronic Respiratory failure w/ Hypoxia and Hypercapnia/ COPD   - s/p intubation; failed extubation   - Pulmonary on board   - tolerating Trach collar, vent discontinued; continue weaning as per Pulmonary   - cont w/ Symbicort Duonebs prn  - Surgery on board, s/p Trach change to #4 fenestrated tracheostomy placed 1/30, f/u Capping trial     # Multiple infectious Disease   - trachobronchitis secondary to citrobacter?? - resolved  - enterococcal faecalis cellulitis - resolved   - infected left hand hematoma - s/p bedside debridement 12/13 w/ hand surgery   - monitor off abx     # Occlusive Left subclavian thrombosis   - LUE venous duplex : occlusive thrombus in the left mid subclavian vein with partial slow flow detected in the lateral subclavian vein.  - LUE arterial Duplex neg   - cont w/ Eliquis     Myoclonic Seizure   - EEG: Myoclonic seizure  - Neurology on board   - cont w/ Keppra Depakote    # HTN   - cont w/ Metoprolol     # DM2   - A1c 8.8%  - cont w/ Lantus   - cont w/ FS monitoring w/ insulin s/s coverage     # Cad s/p PCI w/ stent/ CABG  - cont with ASA and Atorvastatin     # Anemia of Chronic Disease  - h/h stable, will cont  to monitor     # Functional quadriplegic   - RITA on discharge   - supportive care    # Stage 4 decubiti sacrum wound  - s/p debridement 12/19  -cont w/ wound care as recommended     # Dysphagia/ Moderate Protein Calorie Malnutrition  - Peg tube in place   - continue with tube feeding at goal as tolerated.     # Constipation  - cont w/ senna, Miralax prn     Dvt ppx: Eliquis   Dipso: Patient has no insurance and Family in process of getting guardianship. Patient will then need placement.     Plan discussed with son at bedside

## 2023-02-01 LAB
GLUCOSE BLDC GLUCOMTR-MCNC: 178 MG/DL — HIGH (ref 70–99)
GLUCOSE BLDC GLUCOMTR-MCNC: 195 MG/DL — HIGH (ref 70–99)
GLUCOSE BLDC GLUCOMTR-MCNC: 205 MG/DL — HIGH (ref 70–99)
GLUCOSE BLDC GLUCOMTR-MCNC: 212 MG/DL — HIGH (ref 70–99)

## 2023-02-01 PROCEDURE — 99232 SBSQ HOSP IP/OBS MODERATE 35: CPT

## 2023-02-01 RX ORDER — IPRATROPIUM/ALBUTEROL SULFATE 18-103MCG
3 AEROSOL WITH ADAPTER (GRAM) INHALATION EVERY 6 HOURS
Refills: 0 | Status: DISCONTINUED | OUTPATIENT
Start: 2023-02-01 | End: 2023-03-18

## 2023-02-01 RX ADMIN — Medication 1 APPLICATION(S): at 05:02

## 2023-02-01 RX ADMIN — Medication 1 APPLICATION(S): at 05:05

## 2023-02-01 RX ADMIN — Medication 1 APPLICATION(S): at 17:16

## 2023-02-01 RX ADMIN — APIXABAN 5 MILLIGRAM(S): 2.5 TABLET, FILM COATED ORAL at 05:03

## 2023-02-01 RX ADMIN — Medication 1 APPLICATION(S): at 17:25

## 2023-02-01 RX ADMIN — POLYETHYLENE GLYCOL 3350 17 GRAM(S): 17 POWDER, FOR SOLUTION ORAL at 11:32

## 2023-02-01 RX ADMIN — INSULIN GLARGINE 10 UNIT(S): 100 INJECTION, SOLUTION SUBCUTANEOUS at 21:47

## 2023-02-01 RX ADMIN — SCOPALAMINE 1 PATCH: 1 PATCH, EXTENDED RELEASE TRANSDERMAL at 17:23

## 2023-02-01 RX ADMIN — Medication 1 APPLICATION(S): at 05:04

## 2023-02-01 RX ADMIN — Medication 3 MILLILITER(S): at 05:23

## 2023-02-01 RX ADMIN — SENNA PLUS 2 TABLET(S): 8.6 TABLET ORAL at 21:33

## 2023-02-01 RX ADMIN — Medication 3 MILLILITER(S): at 00:44

## 2023-02-01 RX ADMIN — LEVETIRACETAM 1500 MILLIGRAM(S): 250 TABLET, FILM COATED ORAL at 17:17

## 2023-02-01 RX ADMIN — SCOPALAMINE 1 PATCH: 1 PATCH, EXTENDED RELEASE TRANSDERMAL at 07:23

## 2023-02-01 RX ADMIN — Medication 1 APPLICATION(S): at 17:00

## 2023-02-01 RX ADMIN — BUDESONIDE AND FORMOTEROL FUMARATE DIHYDRATE 2 PUFF(S): 160; 4.5 AEROSOL RESPIRATORY (INHALATION) at 17:07

## 2023-02-01 RX ADMIN — Medication 1 APPLICATION(S): at 17:17

## 2023-02-01 RX ADMIN — Medication 1: at 05:21

## 2023-02-01 RX ADMIN — Medication 1 MILLIGRAM(S): at 11:31

## 2023-02-01 RX ADMIN — ROBINUL 1 MILLIGRAM(S): 0.2 INJECTION INTRAMUSCULAR; INTRAVENOUS at 05:03

## 2023-02-01 RX ADMIN — ATORVASTATIN CALCIUM 20 MILLIGRAM(S): 80 TABLET, FILM COATED ORAL at 21:33

## 2023-02-01 RX ADMIN — PREGABALIN 1000 MICROGRAM(S): 225 CAPSULE ORAL at 11:31

## 2023-02-01 RX ADMIN — Medication 750 MILLIGRAM(S): at 21:34

## 2023-02-01 RX ADMIN — LEVETIRACETAM 1500 MILLIGRAM(S): 250 TABLET, FILM COATED ORAL at 05:03

## 2023-02-01 RX ADMIN — BUDESONIDE AND FORMOTEROL FUMARATE DIHYDRATE 2 PUFF(S): 160; 4.5 AEROSOL RESPIRATORY (INHALATION) at 05:05

## 2023-02-01 RX ADMIN — Medication 3 MILLILITER(S): at 11:10

## 2023-02-01 RX ADMIN — Medication 2: at 11:31

## 2023-02-01 RX ADMIN — Medication 2: at 23:58

## 2023-02-01 RX ADMIN — Medication 650 MILLIGRAM(S): at 13:38

## 2023-02-01 RX ADMIN — Medication 650 MILLIGRAM(S): at 12:35

## 2023-02-01 RX ADMIN — APIXABAN 5 MILLIGRAM(S): 2.5 TABLET, FILM COATED ORAL at 17:17

## 2023-02-01 RX ADMIN — CHLORHEXIDINE GLUCONATE 1 APPLICATION(S): 213 SOLUTION TOPICAL at 05:05

## 2023-02-01 RX ADMIN — Medication 81 MILLIGRAM(S): at 11:31

## 2023-02-01 RX ADMIN — Medication 750 MILLIGRAM(S): at 07:58

## 2023-02-01 RX ADMIN — Medication 1 APPLICATION(S): at 05:06

## 2023-02-01 RX ADMIN — ROBINUL 1 MILLIGRAM(S): 0.2 INJECTION INTRAMUSCULAR; INTRAVENOUS at 17:17

## 2023-02-01 NOTE — CHART NOTE - NSCHARTNOTEFT_GEN_A_CORE
Assessment:  Pt adm c dx of hypoglycemia, cardiac arrest, hypothermia, c acute respiratory failure (now chronic), new onset seizures, s/p ELMER, shock liver, E. Coli, UTI, Pt s/p trach, PEG, was on vent c trach collar 24/7 since 1/23; off vent since 1/26; trach weaning being planned.  Pt also c LUE VTE c cellulitis, infected left hand hematoma, s/p debridement for sacral ulcer (12/13).  Family in precess of obtaining guardianship; pt maya Cortes has been appointed temporary guardian per SW note; court date 3/14/23; d/c plan for placement noted.    PMH include COPD, CHF, HTN, HLD, CAD, CABG, CKD, CVA, BPH. DM( was on Jardiance, Metformin, Victoza pen, Insulin Lispro 10 units, 2 x day, and Insulin Lantus 20 units per       Factors impacting intake: [ ] none [ ] nausea  [ ] vomiting [ ] diarrhea [ ] constipation  [ ]chewing problems [ ] swallowing issues  [ X] other: inability to self-feed    Diet Prescription: Diet, NPO with Tube Feed:   Tube Feeding Modality: Gastrostomy  Glucerna 1.2 Pedrito  Total Volume for 24 Hours (mL): 1440  Continuous  Until Goal Tube Feed Rate (mL per Hour): 60  Tube Feed Duration (in Hours): 24  Tube Feed Start Time: 14:30  Free Water Flush  Free Water Flush Instructions:  30 ml/hr via pump  Liquid Protein Supplement     Qty per Day:  1 (01-09-23 @ 13:45)    Intake: Glucerna 1.2 @ goal of 60 ml/hr x 24 hrs (provides 1440 ml, 1728 kcal, 86 gram protein, 1166 H2O, 120% RDIs); pt continues to tolerate well    Current Weight: Weight (kg): 77.5 (1/19 - last recorded wt); admission wt 82.2 (11/14); wt of 111.3 (1/14) is most likely inaccurate as not consistent c other wts;   % Weight Change:  5.7% wt loss x 2.5 months    No edema documented since 1/7/23; 1+ generalized edema noted at admission    Nutrition Focused Physical re-exam will be performed on next f/u 2/8      Pertinent Medications: MEDICATIONS  (STANDING):  apixaban 5 milliGRAM(s) Oral every 12 hours  aspirin  chewable 81 milliGRAM(s) Oral daily  atorvastatin 20 milliGRAM(s) Oral at bedtime  bacitracin   Ointment 1 Application(s) Topical two times a day  bacitracin   Ointment 1 Application(s) Topical two times a day  budesonide 160 MICROgram(s)/formoterol 4.5 MICROgram(s) Inhaler 2 Puff(s) Inhalation two times a day  chlorhexidine 2% Cloths 1 Application(s) Topical <User Schedule>  collagenase Ointment 1 Application(s) Topical two times a day  cyanocobalamin 1000 MICROGram(s) Oral daily  dextrose 5%. 1000 milliLiter(s) (50 mL/Hr) IV Continuous <Continuous>  dextrose 5%. 1000 milliLiter(s) (100 mL/Hr) IV Continuous <Continuous>  dextrose 50% Injectable 25 Gram(s) IV Push once  dextrose 50% Injectable 12.5 Gram(s) IV Push once  dextrose 50% Injectable 25 Gram(s) IV Push once  diphenhydrAMINE Injectable 50 milliGRAM(s) IV Push once  folic acid 1 milliGRAM(s) Oral daily  glucagon  Injectable 1 milliGRAM(s) IntraMuscular once  glycopyrrolate 1 milliGRAM(s) Oral two times a day  insulin glargine Injectable (LANTUS) 10 Unit(s) SubCutaneous at bedtime  insulin lispro (ADMELOG) corrective regimen sliding scale   SubCutaneous every 6 hours  levETIRAcetam 1500 milliGRAM(s) Oral two times a day  metoprolol tartrate 25 milliGRAM(s) Oral two times a day  polyethylene glycol 3350 17 Gram(s) Oral daily  scopolamine 1 mG/72 Hr(s) Patch 1 Patch Transdermal every 72 hours  senna 2 Tablet(s) Oral at bedtime  silver sulfADIAZINE 1% Cream 1 Application(s) Topical two times a day  valproic  acid Syrup 750 milliGRAM(s) Oral <User Schedule>  vitamin A &amp; D Ointment 1 Application(s) Topical two times a day    MEDICATIONS  (PRN):  acetaminophen     Tablet .. 650 milliGRAM(s) Oral every 6 hours PRN Temp greater or equal to 38C (100.4F), Mild Pain (1 - 3)  albuterol/ipratropium for Nebulization 3 milliLiter(s) Nebulizer every 6 hours PRN Shortness of Breath and/or Wheezing  aluminum hydroxide/magnesium hydroxide/simethicone Suspension 30 milliLiter(s) Oral every 4 hours PRN Dyspepsia  dextrose Oral Gel 15 Gram(s) Oral once PRN Blood Glucose LESS THAN 70 milliGRAM(s)/deciliter    Pertinent Labs: 01-30 Na144 mmol/L Glu 154 mg/dL<H> K+ 4.3 mmol/L Cr  0.86 mg/dL BUN 31 mg/dL<H> 01-30 Phos 3.0 mg/dL  1-22-23  A1C 5.9%; 01-31 POCT; 175, 314, 138, 192    Skin:   stage IV pressure ulcers noted on sacrum    Estimated Needs:   [x ] no change since previous assessment (1/9)  [ ] recalculated:     Previous Nutrition Diagnosis: (12/23)  [x ] Malnutrition; moderate malnutrition in context of acute illness   Related to: increased protein energy needs in setting of cardiac arrest, acute respiratory failure, s/p ELMER, shock liver, pressure ulcers/wounds  As evidenced by: physical findings of mild muscle wasting & mild/moderate fat depletion as noted 1/25    GOAL: pt to meet >75% protein-energy needs via tube feeding - continues to be met at this time    Nutrition Diagnosis is [x ] ongoing  [ ] resolved [ ] not applicable     New Nutrition Diagnosis: [x ] not applicable         Interventions:  continue current Diet order as noted  Recommend  [ ] Change Diet To:  [ ] Nutrition Supplement  [ ] Nutrition Support  [ ] Other:     Monitoring and Evaluation:   [ ] PO intake [ x ] Tolerance to diet prescription [ x ] weights [ x ] labs[ x ] follow up per protocol  [ ] other:

## 2023-02-01 NOTE — PROGRESS NOTE ADULT - ASSESSMENT
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  Post arrest noted to have generalized tonic-clonic seizures confirmed on EEG. Started on antiepileptics. Hospital course complicated by failure to wean s/p trach and peg 12/5/22, left upper extremity DVT initiated on eliquis, fevers with multiple infections due to enterococcal faecalis cellulitis, infected left hand hematoma s/p debridement 12/13, citrobacter and enterobacter cloacae tracheobronchitis, ecoli UTI. Trach changed and down sized from shiley 6 to #4 fenestrated cuffed trach on 1/30.      DX: cardiac arrest, respiratory arrest / acute hypoxic respiratory failure requiring intubation, failure to wean s/p trach and PEG, new onset tonic clonic seizure, hypoglycemia with underlying DM, ELMER on CKD,with ATN, shock liver (resolved), takosubo cardiomyopathy, left arm DVT, L hand hematoma and cellulitis, tracheobronchitis, UTI    - still with secretions requiring tracheal suctioning by myself and staff throughout the day  - secretions clear to white in color  - tolerating size 4 fenestrated cuffed trach (changed/downsized 1/30) due to inability to change to number 6 fenestrated cuffed trach with difficulty inserting number 6 past stoma  - capping trial held in setting of copious secretions  - has been off mechanical vent support since 1/23  - cont with trach collar around the clock  - on scopolamine patch for secretions  - currently being monitored off antibiotics  - remains on AC for LUE DVT  - pt awake, alert  - continue PT/OT  - cont PEG feeds  - son at bedside updated today

## 2023-02-01 NOTE — PROGRESS NOTE ADULT - SUBJECTIVE AND OBJECTIVE BOX
*Covering for pulmonary Dr. Crawford    24 hr events  no acute events  still with secretions  tracheal suctioning provided  tolerating trach collar around the clock  trach downsized 1/30    ROS  [x] unable to obtain due to trach status    MEDICATIONS  (STANDING):  apixaban 5 milliGRAM(s) Oral every 12 hours  aspirin  chewable 81 milliGRAM(s) Oral daily  atorvastatin 20 milliGRAM(s) Oral at bedtime  bacitracin   Ointment 1 Application(s) Topical two times a day  bacitracin   Ointment 1 Application(s) Topical two times a day  budesonide 160 MICROgram(s)/formoterol 4.5 MICROgram(s) Inhaler 2 Puff(s) Inhalation two times a day  chlorhexidine 2% Cloths 1 Application(s) Topical <User Schedule>  collagenase Ointment 1 Application(s) Topical two times a day  cyanocobalamin 1000 MICROGram(s) Oral daily  dextrose 5%. 1000 milliLiter(s) (100 mL/Hr) IV Continuous <Continuous>  dextrose 5%. 1000 milliLiter(s) (50 mL/Hr) IV Continuous <Continuous>  dextrose 50% Injectable 25 Gram(s) IV Push once  dextrose 50% Injectable 12.5 Gram(s) IV Push once  dextrose 50% Injectable 25 Gram(s) IV Push once  diphenhydrAMINE Injectable 50 milliGRAM(s) IV Push once  folic acid 1 milliGRAM(s) Oral daily  glucagon  Injectable 1 milliGRAM(s) IntraMuscular once  glycopyrrolate 1 milliGRAM(s) Oral two times a day  insulin glargine Injectable (LANTUS) 10 Unit(s) SubCutaneous at bedtime  insulin lispro (ADMELOG) corrective regimen sliding scale   SubCutaneous every 6 hours  levETIRAcetam 1500 milliGRAM(s) Oral two times a day  metoprolol tartrate 25 milliGRAM(s) Oral two times a day  polyethylene glycol 3350 17 Gram(s) Oral daily  scopolamine 1 mG/72 Hr(s) Patch 1 Patch Transdermal every 72 hours  senna 2 Tablet(s) Oral at bedtime  silver sulfADIAZINE 1% Cream 1 Application(s) Topical two times a day  valproic  acid Syrup 750 milliGRAM(s) Oral <User Schedule>  vitamin A &amp; D Ointment 1 Application(s) Topical two times a day    MEDICATIONS  (PRN):  acetaminophen     Tablet .. 650 milliGRAM(s) Oral every 6 hours PRN Temp greater or equal to 38C (100.4F), Mild Pain (1 - 3)  albuterol/ipratropium for Nebulization 3 milliLiter(s) Nebulizer every 6 hours PRN Shortness of Breath and/or Wheezing  aluminum hydroxide/magnesium hydroxide/simethicone Suspension 30 milliLiter(s) Oral every 4 hours PRN Dyspepsia  dextrose Oral Gel 15 Gram(s) Oral once PRN Blood Glucose LESS THAN 70 milliGRAM(s)/deciliter    LABS  no new labs    VITALS  T(F): 98.3 (02-01-23 @ 16:31), Max: 99 (02-01-23 @ 10:56)  HR: 62 (02-01-23 @ 16:31) (58 - 72)  BP: 107/64 (02-01-23 @ 16:31) (100/61 - 125/76)  RR: 18 (02-01-23 @ 16:31) (18 - 19)  SpO2: 100% (02-01-23 @ 16:31) (97% - 100%)      EXAM  GEN: NAD, comfortable in bed, awake  HEENT: NC/AT: EOMI, sclera white, trach in place with white/clear secretions  CV: RRR  PULM: bilateral rhonchi, no wheeze  ABD: soft, NT, ND, + BS, + PEG  EXT: no edema/cyanosis/clubbing  NEURO: awake, follows simple commands

## 2023-02-01 NOTE — PROGRESS NOTE ADULT - ASSESSMENT
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 ACLS for PEA arrest and ROSC returned after 5 minutes. Cardiac arrest possibly secondary to severe hypoglycemia from eating less and not tracking blood sugar carefully. Hospital course complicated by 1) prolonged hypoxemic respiratory failure. sp trach and peg 2) left upper extremity DVT and placed on eliquis 3) multiple infections (enteroccoal facelis cellutis, infected left hand hematoma, tracheobronchitis secondary to citrobacr 4) tonic clonic seizure - on keppra/depakoate- currently undergoing reevaluation by NEUROLOGY 5) fever of 102 on 1/19. INFECTIOUS DISEASE reconsulted and pan culture ordered     # Cardiac Arrest   - s/p ACLS w/ ROSC after 5 min   - 2nd degree heart block, s/p PPM   - ECHO Takotsubo cardiomyopathy. EF 50-55%, LVH, mild pHTN    - s/p ICU course; Hemodynamically stable now  - cont w/ Metoprolol, ASA and atorvastatin     # Acute/Chronic Respiratory failure w/ Hypoxia and Hypercapnia/ COPD   - s/p intubation; failed extubation   - Pulmonary on board   - tolerating Trach collar, vent discontinued; continue weaning as per Pulmonary   - cont w/ Symbicort Duonebs prn  - Surgery on board, s/p Trach change to #4 fenestrated tracheostomy placed 1/30, f/u Capping trial     # Multiple infectious Disease   - trachobronchitis secondary to citrobacter?? - resolved  - enterococcal faecalis cellulitis - resolved   - infected left hand hematoma - s/p bedside debridement 12/13 w/ hand surgery   - monitor off abx     # Occlusive Left subclavian thrombosis   - LUE venous duplex : occlusive thrombus in the left mid subclavian vein with partial slow flow detected in the lateral subclavian vein.  - LUE arterial Duplex neg   - cont w/ Eliquis     Myoclonic Seizure   - EEG: Myoclonic seizure  - Neurology on board   - cont w/ Keppra Depakote    # HTN   - cont w/ Metoprolol     # DM2   - A1c 8.8%  - cont w/ Lantus   - cont w/ FS monitoring w/ insulin s/s coverage     # Cad s/p PCI w/ stent/ CABG  - cont with ASA and Atorvastatin     # Anemia of Chronic Disease  - h/h stable, will cont  to monitor     # Functional quadriplegic   - RITA on discharge   - supportive care    # Stage 4 decubiti sacrum wound  - s/p debridement 12/19  -cont w/ wound care as recommended     # Dysphagia/ Moderate Protein Calorie Malnutrition  - Peg tube in place   - continue with tube feeding at goal as tolerated.     # Constipation  - cont w/ senna, Miralax prn     Dvt ppx: Eliquis   Dipso: Patient has no insurance and Family in process of getting guardianship. Patient will then need placement.     Plan discussed with son at bedside

## 2023-02-01 NOTE — PROGRESS NOTE ADULT - SUBJECTIVE AND OBJECTIVE BOX
Patient is a 72y old  Male who presents with a chief complaint of s/p cardiac arrest, hypoglycemia (31 Jan 2023 18:07)      OVERNIGHT EVENTS:  Patients seen and examined at bedside this morning. No acute events overnight.    REVIEW OF SYSTEMS: unable to obtain    MEDICATIONS  (STANDING):  apixaban 5 milliGRAM(s) Oral every 12 hours  aspirin  chewable 81 milliGRAM(s) Oral daily  atorvastatin 20 milliGRAM(s) Oral at bedtime  bacitracin   Ointment 1 Application(s) Topical two times a day  bacitracin   Ointment 1 Application(s) Topical two times a day  budesonide 160 MICROgram(s)/formoterol 4.5 MICROgram(s) Inhaler 2 Puff(s) Inhalation two times a day  chlorhexidine 2% Cloths 1 Application(s) Topical <User Schedule>  collagenase Ointment 1 Application(s) Topical two times a day  cyanocobalamin 1000 MICROGram(s) Oral daily  dextrose 5%. 1000 milliLiter(s) (100 mL/Hr) IV Continuous <Continuous>  dextrose 5%. 1000 milliLiter(s) (50 mL/Hr) IV Continuous <Continuous>  dextrose 50% Injectable 25 Gram(s) IV Push once  dextrose 50% Injectable 12.5 Gram(s) IV Push once  dextrose 50% Injectable 25 Gram(s) IV Push once  diphenhydrAMINE Injectable 50 milliGRAM(s) IV Push once  folic acid 1 milliGRAM(s) Oral daily  glucagon  Injectable 1 milliGRAM(s) IntraMuscular once  glycopyrrolate 1 milliGRAM(s) Oral two times a day  insulin glargine Injectable (LANTUS) 10 Unit(s) SubCutaneous at bedtime  insulin lispro (ADMELOG) corrective regimen sliding scale   SubCutaneous every 6 hours  levETIRAcetam 1500 milliGRAM(s) Oral two times a day  metoprolol tartrate 25 milliGRAM(s) Oral two times a day  polyethylene glycol 3350 17 Gram(s) Oral daily  scopolamine 1 mG/72 Hr(s) Patch 1 Patch Transdermal every 72 hours  senna 2 Tablet(s) Oral at bedtime  silver sulfADIAZINE 1% Cream 1 Application(s) Topical two times a day  valproic  acid Syrup 750 milliGRAM(s) Oral <User Schedule>  vitamin A &amp; D Ointment 1 Application(s) Topical two times a day    MEDICATIONS  (PRN):  acetaminophen     Tablet .. 650 milliGRAM(s) Oral every 6 hours PRN Temp greater or equal to 38C (100.4F), Mild Pain (1 - 3)  albuterol/ipratropium for Nebulization 3 milliLiter(s) Nebulizer every 6 hours PRN Shortness of Breath and/or Wheezing  aluminum hydroxide/magnesium hydroxide/simethicone Suspension 30 milliLiter(s) Oral every 4 hours PRN Dyspepsia  dextrose Oral Gel 15 Gram(s) Oral once PRN Blood Glucose LESS THAN 70 milliGRAM(s)/deciliter      Allergies    No Known Allergies    Intolerances        T(F): 98.3 (02-01-23 @ 16:31), Max: 99 (02-01-23 @ 10:56)  HR: 62 (02-01-23 @ 16:31) (58 - 72)  BP: 107/64 (02-01-23 @ 16:31) (100/61 - 125/76)  RR: 18 (02-01-23 @ 16:31) (18 - 19)  SpO2: 100% (02-01-23 @ 16:31) (97% - 100%)  Wt(kg): --    PHYSICAL EXAM:  GENERAL: NAD  HEAD:  Atraumatic, Normocephalic  EYES: PERRLA, conjunctiva and sclera clear  ENMT: Moist mucous membranes, +trach  NECK: Supple, No JVD, Normal thyroid  NERVOUS SYSTEM:  Awake  CHEST/LUNG: Clear to percussion bilaterally;   HEART: Regular rate and rhythm;   ABDOMEN: Soft, Nontender, Nondistended; Bowel sounds present  EXTREMITIES:  no edema BL LE  SKIN: moist    LABS:              Cultures;   CAPILLARY BLOOD GLUCOSE      POCT Blood Glucose.: 205 mg/dL (01 Feb 2023 11:10)  POCT Blood Glucose.: 178 mg/dL (01 Feb 2023 05:17)  POCT Blood Glucose.: 192 mg/dL (31 Jan 2023 23:27)  POCT Blood Glucose.: 138 mg/dL (31 Jan 2023 17:40)    Lipid panel:           RADIOLOGY & ADDITIONAL TESTS:    Imaging Personally Reviewed:  [x ] YES      Consultant(s) Notes Reviewed:  [x ] YES     Care Discussed with [x ] Consultants [X ] Patient [ ] Family  [x ]    [x ]  Other; RN

## 2023-02-02 LAB
GLUCOSE BLDC GLUCOMTR-MCNC: 133 MG/DL — HIGH (ref 70–99)
GLUCOSE BLDC GLUCOMTR-MCNC: 146 MG/DL — HIGH (ref 70–99)
GLUCOSE BLDC GLUCOMTR-MCNC: 180 MG/DL — HIGH (ref 70–99)
GLUCOSE BLDC GLUCOMTR-MCNC: 200 MG/DL — HIGH (ref 70–99)

## 2023-02-02 PROCEDURE — 99232 SBSQ HOSP IP/OBS MODERATE 35: CPT

## 2023-02-02 RX ADMIN — Medication 650 MILLIGRAM(S): at 15:15

## 2023-02-02 RX ADMIN — ATORVASTATIN CALCIUM 20 MILLIGRAM(S): 80 TABLET, FILM COATED ORAL at 21:51

## 2023-02-02 RX ADMIN — LEVETIRACETAM 1500 MILLIGRAM(S): 250 TABLET, FILM COATED ORAL at 18:23

## 2023-02-02 RX ADMIN — Medication 1 APPLICATION(S): at 05:05

## 2023-02-02 RX ADMIN — ROBINUL 1 MILLIGRAM(S): 0.2 INJECTION INTRAMUSCULAR; INTRAVENOUS at 05:06

## 2023-02-02 RX ADMIN — SCOPALAMINE 1 PATCH: 1 PATCH, EXTENDED RELEASE TRANSDERMAL at 19:00

## 2023-02-02 RX ADMIN — PREGABALIN 1000 MICROGRAM(S): 225 CAPSULE ORAL at 12:37

## 2023-02-02 RX ADMIN — Medication 750 MILLIGRAM(S): at 09:01

## 2023-02-02 RX ADMIN — Medication 1 APPLICATION(S): at 18:59

## 2023-02-02 RX ADMIN — Medication 1: at 12:34

## 2023-02-02 RX ADMIN — SCOPALAMINE 1 PATCH: 1 PATCH, EXTENDED RELEASE TRANSDERMAL at 21:40

## 2023-02-02 RX ADMIN — CHLORHEXIDINE GLUCONATE 1 APPLICATION(S): 213 SOLUTION TOPICAL at 05:05

## 2023-02-02 RX ADMIN — Medication 1 APPLICATION(S): at 05:23

## 2023-02-02 RX ADMIN — Medication 1 APPLICATION(S): at 05:04

## 2023-02-02 RX ADMIN — ROBINUL 1 MILLIGRAM(S): 0.2 INJECTION INTRAMUSCULAR; INTRAVENOUS at 18:25

## 2023-02-02 RX ADMIN — Medication 1 MILLIGRAM(S): at 12:38

## 2023-02-02 RX ADMIN — Medication 81 MILLIGRAM(S): at 12:37

## 2023-02-02 RX ADMIN — Medication 25 MILLIGRAM(S): at 05:25

## 2023-02-02 RX ADMIN — APIXABAN 5 MILLIGRAM(S): 2.5 TABLET, FILM COATED ORAL at 18:25

## 2023-02-02 RX ADMIN — LEVETIRACETAM 1500 MILLIGRAM(S): 250 TABLET, FILM COATED ORAL at 05:06

## 2023-02-02 RX ADMIN — Medication 750 MILLIGRAM(S): at 21:43

## 2023-02-02 RX ADMIN — Medication 25 MILLIGRAM(S): at 18:24

## 2023-02-02 RX ADMIN — APIXABAN 5 MILLIGRAM(S): 2.5 TABLET, FILM COATED ORAL at 05:09

## 2023-02-02 RX ADMIN — INSULIN GLARGINE 10 UNIT(S): 100 INJECTION, SOLUTION SUBCUTANEOUS at 22:25

## 2023-02-02 RX ADMIN — Medication 1 APPLICATION(S): at 18:34

## 2023-02-02 RX ADMIN — Medication 650 MILLIGRAM(S): at 12:37

## 2023-02-02 RX ADMIN — SENNA PLUS 2 TABLET(S): 8.6 TABLET ORAL at 21:43

## 2023-02-02 RX ADMIN — BUDESONIDE AND FORMOTEROL FUMARATE DIHYDRATE 2 PUFF(S): 160; 4.5 AEROSOL RESPIRATORY (INHALATION) at 05:04

## 2023-02-02 RX ADMIN — SCOPALAMINE 1 PATCH: 1 PATCH, EXTENDED RELEASE TRANSDERMAL at 11:54

## 2023-02-02 NOTE — PROGRESS NOTE ADULT - SUBJECTIVE AND OBJECTIVE BOX
*Covering for Dr. Crawford pulmonary    24 hr events:  no acute events  still with copious secretions from trach requiring tracheal suctioning  remains on trach collar    ROS [x] limited due to trach and ability to speak  denies SOB  + cough  no CP    MEDICATIONS  (STANDING):  apixaban 5 milliGRAM(s) Oral every 12 hours  aspirin  chewable 81 milliGRAM(s) Oral daily  atorvastatin 20 milliGRAM(s) Oral at bedtime  bacitracin   Ointment 1 Application(s) Topical two times a day  bacitracin   Ointment 1 Application(s) Topical two times a day  budesonide 160 MICROgram(s)/formoterol 4.5 MICROgram(s) Inhaler 2 Puff(s) Inhalation two times a day  chlorhexidine 2% Cloths 1 Application(s) Topical <User Schedule>  collagenase Ointment 1 Application(s) Topical two times a day  cyanocobalamin 1000 MICROGram(s) Oral daily  dextrose 5%. 1000 milliLiter(s) (50 mL/Hr) IV Continuous <Continuous>  dextrose 5%. 1000 milliLiter(s) (100 mL/Hr) IV Continuous <Continuous>  dextrose 50% Injectable 25 Gram(s) IV Push once  dextrose 50% Injectable 12.5 Gram(s) IV Push once  dextrose 50% Injectable 25 Gram(s) IV Push once  diphenhydrAMINE Injectable 50 milliGRAM(s) IV Push once  folic acid 1 milliGRAM(s) Oral daily  glucagon  Injectable 1 milliGRAM(s) IntraMuscular once  glycopyrrolate 1 milliGRAM(s) Oral two times a day  insulin glargine Injectable (LANTUS) 10 Unit(s) SubCutaneous at bedtime  insulin lispro (ADMELOG) corrective regimen sliding scale   SubCutaneous every 6 hours  levETIRAcetam 1500 milliGRAM(s) Oral two times a day  metoprolol tartrate 25 milliGRAM(s) Oral two times a day  polyethylene glycol 3350 17 Gram(s) Oral daily  scopolamine 1 mG/72 Hr(s) Patch 1 Patch Transdermal every 72 hours  senna 2 Tablet(s) Oral at bedtime  silver sulfADIAZINE 1% Cream 1 Application(s) Topical two times a day  valproic  acid Syrup 750 milliGRAM(s) Oral <User Schedule>  vitamin A &amp; D Ointment 1 Application(s) Topical two times a day    MEDICATIONS  (PRN):  acetaminophen     Tablet .. 650 milliGRAM(s) Oral every 6 hours PRN Temp greater or equal to 38C (100.4F), Mild Pain (1 - 3)  albuterol/ipratropium for Nebulization 3 milliLiter(s) Nebulizer every 6 hours PRN Shortness of Breath and/or Wheezing  aluminum hydroxide/magnesium hydroxide/simethicone Suspension 30 milliLiter(s) Oral every 4 hours PRN Dyspepsia  dextrose Oral Gel 15 Gram(s) Oral once PRN Blood Glucose LESS THAN 70 milliGRAM(s)/deciliter      LABS  no new labs    IMAGING  CXR < from: Xray Chest 1 View- PORTABLE-Urgent (Xray Chest 1 View- PORTABLE-Urgent .) (01.30.23 @ 19:59) >  Tracheostomy tube projected in satisfactory position      VITALS  T(F): 97.6 (02-02-23 @ 10:58), Max: 98.5 (02-01-23 @ 23:47)  HR: 80 (02-02-23 @ 10:58) (62 - 80)  BP: 123/67 (02-02-23 @ 10:58) (107/64 - 123/67)  RR: 18 (02-02-23 @ 12:44) (18 - 20)  SpO2: 97% (02-02-23 @ 12:44) (97% - 100%)    EXAM  GEN: elderly male, NAD, comfortable in bed  HEENT: NC/AT, sclera white, trach in place, copious clear secretion  CV: RRR  PULM: scattered rhonchi clears with suctioning, no wheeze  ABD: soft, ND, NT, + BS + PEG  EXT: no edema/cyanosis  NEURO: awake, follows simple commands, attempting to communicate by scribbling on paper (difficult to read writing at times)

## 2023-02-02 NOTE — PROGRESS NOTE ADULT - ASSESSMENT
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  Post arrest noted to have generalized tonic-clonic seizures confirmed on EEG. Started on antiepileptics. Hospital course complicated by failure to wean s/p trach and peg 12/5/22, left upper extremity DVT initiated on eliquis, fevers with multiple infections due to enterococcal faecalis cellulitis, infected left hand hematoma s/p debridement 12/13, citrobacter and enterobacter cloacae tracheobronchitis, ecoli UTI. Trach changed and down sized from shiley 6 to #4 fenestrated cuffed trach on 1/30.      DX: cardiac arrest, respiratory arrest / acute hypoxic respiratory failure requiring intubation, failure to wean s/p trach and PEG, new onset tonic clonic seizure, hypoglycemia with underlying DM, ELMER on CKD,with ATN, shock liver (resolved), takosubo cardiomyopathy, left arm DVT, L hand hematoma and cellulitis, tracheobronchitis, UTI    - still with secretions requiring tracheal suctioning by myself and staff  - secretions clear to white in color  - tolerating size 4 fenestrated cuffed trach (changed/downsized 1/30) due to inability to change to number 6 fenestrated cuffed trach with difficulty inserting number 6 past stoma  - capping trial held in setting of copious secretions  - has been off mechanical vent support since 1/23  - cont with trach collar around the clock  - on scopolamine patch for secretions  - currently being monitored off antibiotics  - remains on AC for LUE DVT  - pt awake, alert  - continue PT/OT  - son at bedside, updated

## 2023-02-02 NOTE — PROGRESS NOTE ADULT - SUBJECTIVE AND OBJECTIVE BOX
Patient is a 72y old  Male who presents with a chief complaint of s/p cardiac arrest, hypoglycemia (01 Feb 2023 16:42)      OVERNIGHT EVENTS:  Patients seen and examined at bedside this morning. No acute events overnight.    REVIEW OF SYSTEMS: unable to obtain due to poor mentation     MEDICATIONS  (STANDING):  apixaban 5 milliGRAM(s) Oral every 12 hours  aspirin  chewable 81 milliGRAM(s) Oral daily  atorvastatin 20 milliGRAM(s) Oral at bedtime  bacitracin   Ointment 1 Application(s) Topical two times a day  bacitracin   Ointment 1 Application(s) Topical two times a day  budesonide 160 MICROgram(s)/formoterol 4.5 MICROgram(s) Inhaler 2 Puff(s) Inhalation two times a day  chlorhexidine 2% Cloths 1 Application(s) Topical <User Schedule>  collagenase Ointment 1 Application(s) Topical two times a day  cyanocobalamin 1000 MICROGram(s) Oral daily  dextrose 5%. 1000 milliLiter(s) (50 mL/Hr) IV Continuous <Continuous>  dextrose 5%. 1000 milliLiter(s) (100 mL/Hr) IV Continuous <Continuous>  dextrose 50% Injectable 25 Gram(s) IV Push once  dextrose 50% Injectable 12.5 Gram(s) IV Push once  dextrose 50% Injectable 25 Gram(s) IV Push once  diphenhydrAMINE Injectable 50 milliGRAM(s) IV Push once  folic acid 1 milliGRAM(s) Oral daily  glucagon  Injectable 1 milliGRAM(s) IntraMuscular once  glycopyrrolate 1 milliGRAM(s) Oral two times a day  insulin glargine Injectable (LANTUS) 10 Unit(s) SubCutaneous at bedtime  insulin lispro (ADMELOG) corrective regimen sliding scale   SubCutaneous every 6 hours  levETIRAcetam 1500 milliGRAM(s) Oral two times a day  metoprolol tartrate 25 milliGRAM(s) Oral two times a day  polyethylene glycol 3350 17 Gram(s) Oral daily  scopolamine 1 mG/72 Hr(s) Patch 1 Patch Transdermal every 72 hours  senna 2 Tablet(s) Oral at bedtime  silver sulfADIAZINE 1% Cream 1 Application(s) Topical two times a day  valproic  acid Syrup 750 milliGRAM(s) Oral <User Schedule>  vitamin A &amp; D Ointment 1 Application(s) Topical two times a day    MEDICATIONS  (PRN):  acetaminophen     Tablet .. 650 milliGRAM(s) Oral every 6 hours PRN Temp greater or equal to 38C (100.4F), Mild Pain (1 - 3)  albuterol/ipratropium for Nebulization 3 milliLiter(s) Nebulizer every 6 hours PRN Shortness of Breath and/or Wheezing  aluminum hydroxide/magnesium hydroxide/simethicone Suspension 30 milliLiter(s) Oral every 4 hours PRN Dyspepsia  dextrose Oral Gel 15 Gram(s) Oral once PRN Blood Glucose LESS THAN 70 milliGRAM(s)/deciliter      Allergies    No Known Allergies    Intolerances        T(F): 97.6 (02-02-23 @ 10:58), Max: 98.5 (02-01-23 @ 23:47)  HR: 80 (02-02-23 @ 10:58) (62 - 80)  BP: 123/67 (02-02-23 @ 10:58) (107/64 - 123/67)  RR: 18 (02-02-23 @ 12:44) (18 - 20)  SpO2: 97% (02-02-23 @ 12:44) (97% - 100%)  Wt(kg): --    PHYSICAL EXAM:  GENERAL: NAD  HEAD:  Atraumatic, Normocephalic  EYES: PERRLA, conjunctiva and sclera clear  ENMT: Moist mucous membranes  NECK: Supple, No JVD, Normal thyroid  NERVOUS SYSTEM:  Alert & Awake  CHEST/LUNG: Clear to percussion bilaterally;   HEART: Regular rate and rhythm;   ABDOMEN: Soft, Nontender, Nondistended; Bowel sounds present  EXTREMITIES:  no edema BL LE  SKIN: moist    LABS:              Cultures;   CAPILLARY BLOOD GLUCOSE      POCT Blood Glucose.: 180 mg/dL (02 Feb 2023 12:05)  POCT Blood Glucose.: 146 mg/dL (02 Feb 2023 05:06)  POCT Blood Glucose.: 212 mg/dL (01 Feb 2023 23:49)  POCT Blood Glucose.: 195 mg/dL (01 Feb 2023 21:39)    Lipid panel:           RADIOLOGY & ADDITIONAL TESTS:    Imaging Personally Reviewed:  [x ] YES      Consultant(s) Notes Reviewed:  [x ] YES     Care Discussed with [x ] Consultants [X ] Patient [ ] Family  [x ]    [x ]  Other; RN

## 2023-02-02 NOTE — PROGRESS NOTE ADULT - ASSESSMENT
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 ACLS for PEA arrest and ROSC returned after 5 minutes. Cardiac arrest possibly secondary to severe hypoglycemia from eating less and not tracking blood sugar carefully. Hospital course complicated by 1) prolonged hypoxemic respiratory failure. sp trach and peg 2) left upper extremity DVT and placed on eliquis 3) multiple infections (enteroccoal facelis cellutis, infected left hand hematoma, tracheobronchitis secondary to citrobacr 4) tonic clonic seizure - on keppra/depakoate- currently undergoing reevaluation by NEUROLOGY 5) fever of 102 on 1/19. INFECTIOUS DISEASE reconsulted and pan culture ordered     # s/p Cardiac Arrest   - s/p ACLS w/ ROSC after 5 min   - 2nd degree heart block, s/p PPM   - ECHO Takotsubo cardiomyopathy. EF 50-55%, LVH, mild pHTN    - s/p ICU course; Hemodynamically stable now  - cont w/ Metoprolol, ASA and atorvastatin     # Acute/Chronic Respiratory failure w/ Hypoxia and Hypercapnia/ COPD   - s/p intubation; failed extubation   - Pulmonary on board   - tolerating Trach collar, vent discontinued; continue weaning as per Pulmonary   - cont w/ Symbicort, Duonebs prn  - Surgery on board, s/p Trach change to #4 fenestrated tracheostomy placed 1/30, f/u Capping trial     # Multiple infectious Disease   - trachobronchitis secondary to citrobacter?? - resolved  - enterococcal faecalis cellulitis - resolved   - infected left hand hematoma - s/p bedside debridement 12/13 w/ hand surgery   - monitor off abx     # Occlusive Left subclavian thrombosis   - LUE venous duplex : occlusive thrombus in the left mid subclavian vein with partial slow flow detected in the lateral subclavian vein.  - LUE arterial Duplex neg   - cont w/ Eliquis     Myoclonic Seizure   - EEG: Myoclonic seizure  - Neurology on board   - cont w/ Keppra Depakote    # HTN   - cont w/ Metoprolol     # DM2   - A1c 8.8%  - cont w/ Lantus   - cont w/ FS monitoring w/ insulin s/s coverage     # Cad s/p PCI w/ stent/ CABG  - cont with ASA and Atorvastatin     # Anemia of Chronic Disease  - h/h stable, will cont  to monitor     # Functional quadriplegic   - RITA on discharge   - supportive care    # Stage 4 decubiti sacrum wound  - s/p debridement 12/19  -cont w/ wound care as recommended     # Dysphagia/ Moderate Protein Calorie Malnutrition  - Peg tube in place   - continue with tube feeding at goal as tolerated.     # Constipation  - cont w/ senna, Miralax prn     Dvt ppx: Eliquis   Dipso: Patient has no insurance and Family in process of getting guardianship. Patient will then need placement.     Plan discussed with son at bedside

## 2023-02-03 LAB
ALBUMIN SERPL ELPH-MCNC: 2 G/DL — LOW (ref 3.3–5)
ALP SERPL-CCNC: 82 U/L — SIGNIFICANT CHANGE UP (ref 40–120)
ALT FLD-CCNC: 11 U/L — LOW (ref 12–78)
ANION GAP SERPL CALC-SCNC: 6 MMOL/L — SIGNIFICANT CHANGE UP (ref 5–17)
AST SERPL-CCNC: 9 U/L — LOW (ref 15–37)
BILIRUB SERPL-MCNC: 0.3 MG/DL — SIGNIFICANT CHANGE UP (ref 0.2–1.2)
BUN SERPL-MCNC: 26 MG/DL — HIGH (ref 7–23)
CALCIUM SERPL-MCNC: 8.5 MG/DL — SIGNIFICANT CHANGE UP (ref 8.5–10.1)
CHLORIDE SERPL-SCNC: 102 MMOL/L — SIGNIFICANT CHANGE UP (ref 96–108)
CO2 SERPL-SCNC: 33 MMOL/L — HIGH (ref 22–31)
CREAT SERPL-MCNC: 0.72 MG/DL — SIGNIFICANT CHANGE UP (ref 0.5–1.3)
EGFR: 96 ML/MIN/1.73M2 — SIGNIFICANT CHANGE UP
GLUCOSE BLDC GLUCOMTR-MCNC: 143 MG/DL — HIGH (ref 70–99)
GLUCOSE BLDC GLUCOMTR-MCNC: 151 MG/DL — HIGH (ref 70–99)
GLUCOSE BLDC GLUCOMTR-MCNC: 155 MG/DL — HIGH (ref 70–99)
GLUCOSE BLDC GLUCOMTR-MCNC: 193 MG/DL — HIGH (ref 70–99)
GLUCOSE BLDC GLUCOMTR-MCNC: 200 MG/DL — HIGH (ref 70–99)
GLUCOSE SERPL-MCNC: 134 MG/DL — HIGH (ref 70–99)
HCT VFR BLD CALC: 31.8 % — LOW (ref 39–50)
HGB BLD-MCNC: 10.1 G/DL — LOW (ref 13–17)
MCHC RBC-ENTMCNC: 30.1 PG — SIGNIFICANT CHANGE UP (ref 27–34)
MCHC RBC-ENTMCNC: 31.8 G/DL — LOW (ref 32–36)
MCV RBC AUTO: 94.9 FL — SIGNIFICANT CHANGE UP (ref 80–100)
NRBC # BLD: 0 /100 WBCS — SIGNIFICANT CHANGE UP (ref 0–0)
PLATELET # BLD AUTO: 209 K/UL — SIGNIFICANT CHANGE UP (ref 150–400)
POTASSIUM SERPL-MCNC: 4.1 MMOL/L — SIGNIFICANT CHANGE UP (ref 3.5–5.3)
POTASSIUM SERPL-SCNC: 4.1 MMOL/L — SIGNIFICANT CHANGE UP (ref 3.5–5.3)
PROT SERPL-MCNC: 5.7 GM/DL — LOW (ref 6–8.3)
RBC # BLD: 3.35 M/UL — LOW (ref 4.2–5.8)
RBC # FLD: 14.1 % — SIGNIFICANT CHANGE UP (ref 10.3–14.5)
SODIUM SERPL-SCNC: 141 MMOL/L — SIGNIFICANT CHANGE UP (ref 135–145)
WBC # BLD: 8.81 K/UL — SIGNIFICANT CHANGE UP (ref 3.8–10.5)
WBC # FLD AUTO: 8.81 K/UL — SIGNIFICANT CHANGE UP (ref 3.8–10.5)

## 2023-02-03 PROCEDURE — 99232 SBSQ HOSP IP/OBS MODERATE 35: CPT

## 2023-02-03 RX ADMIN — Medication 1 APPLICATION(S): at 17:42

## 2023-02-03 RX ADMIN — ROBINUL 1 MILLIGRAM(S): 0.2 INJECTION INTRAMUSCULAR; INTRAVENOUS at 05:22

## 2023-02-03 RX ADMIN — Medication 1 APPLICATION(S): at 17:57

## 2023-02-03 RX ADMIN — ATORVASTATIN CALCIUM 20 MILLIGRAM(S): 80 TABLET, FILM COATED ORAL at 21:44

## 2023-02-03 RX ADMIN — SCOPALAMINE 1 PATCH: 1 PATCH, EXTENDED RELEASE TRANSDERMAL at 00:04

## 2023-02-03 RX ADMIN — Medication 1 APPLICATION(S): at 17:43

## 2023-02-03 RX ADMIN — CHLORHEXIDINE GLUCONATE 1 APPLICATION(S): 213 SOLUTION TOPICAL at 05:23

## 2023-02-03 RX ADMIN — Medication 3 MILLILITER(S): at 06:04

## 2023-02-03 RX ADMIN — Medication 1: at 18:20

## 2023-02-03 RX ADMIN — APIXABAN 5 MILLIGRAM(S): 2.5 TABLET, FILM COATED ORAL at 17:57

## 2023-02-03 RX ADMIN — Medication 1 APPLICATION(S): at 05:36

## 2023-02-03 RX ADMIN — APIXABAN 5 MILLIGRAM(S): 2.5 TABLET, FILM COATED ORAL at 05:22

## 2023-02-03 RX ADMIN — SCOPALAMINE 1 PATCH: 1 PATCH, EXTENDED RELEASE TRANSDERMAL at 07:13

## 2023-02-03 RX ADMIN — Medication 81 MILLIGRAM(S): at 12:55

## 2023-02-03 RX ADMIN — Medication 1: at 00:08

## 2023-02-03 RX ADMIN — Medication 1: at 12:01

## 2023-02-03 RX ADMIN — Medication 25 MILLIGRAM(S): at 05:23

## 2023-02-03 RX ADMIN — ROBINUL 1 MILLIGRAM(S): 0.2 INJECTION INTRAMUSCULAR; INTRAVENOUS at 17:57

## 2023-02-03 RX ADMIN — Medication 1 MILLIGRAM(S): at 12:56

## 2023-02-03 RX ADMIN — LEVETIRACETAM 1500 MILLIGRAM(S): 250 TABLET, FILM COATED ORAL at 05:22

## 2023-02-03 RX ADMIN — INSULIN GLARGINE 10 UNIT(S): 100 INJECTION, SOLUTION SUBCUTANEOUS at 21:43

## 2023-02-03 RX ADMIN — BUDESONIDE AND FORMOTEROL FUMARATE DIHYDRATE 2 PUFF(S): 160; 4.5 AEROSOL RESPIRATORY (INHALATION) at 05:22

## 2023-02-03 RX ADMIN — BUDESONIDE AND FORMOTEROL FUMARATE DIHYDRATE 2 PUFF(S): 160; 4.5 AEROSOL RESPIRATORY (INHALATION) at 18:15

## 2023-02-03 RX ADMIN — SENNA PLUS 2 TABLET(S): 8.6 TABLET ORAL at 21:44

## 2023-02-03 RX ADMIN — Medication 1 APPLICATION(S): at 05:23

## 2023-02-03 RX ADMIN — Medication 1 APPLICATION(S): at 05:21

## 2023-02-03 RX ADMIN — Medication 25 MILLIGRAM(S): at 17:58

## 2023-02-03 RX ADMIN — PREGABALIN 1000 MICROGRAM(S): 225 CAPSULE ORAL at 12:55

## 2023-02-03 RX ADMIN — LEVETIRACETAM 1500 MILLIGRAM(S): 250 TABLET, FILM COATED ORAL at 17:58

## 2023-02-03 RX ADMIN — Medication 750 MILLIGRAM(S): at 07:51

## 2023-02-03 RX ADMIN — Medication 750 MILLIGRAM(S): at 21:43

## 2023-02-03 NOTE — OCCUPATIONAL THERAPY INITIAL EVALUATION ADULT - IMPAIRMENTS CONTRIBUTING IMPAIRED BED MOBILITY, REHAB EVAL
impaired balance/impaired coordination/impaired motor control/impaired postural control/impaired sensory feedback/decreased strength
impaired balance/decreased strength

## 2023-02-03 NOTE — OCCUPATIONAL THERAPY INITIAL EVALUATION ADULT - PHYSICAL ASSIST/NONPHYSICAL ASSIST, REHAB EVAL
3 person assist./set-up required/supervision/verbal cues/nonverbal cues (demo/gestures)
verbal cues/2 person assist

## 2023-02-03 NOTE — OCCUPATIONAL THERAPY INITIAL EVALUATION ADULT - ADL RETRAINING, OT EVAL
LTG- pt will perform toileting tasks with adaptive equipment independently in 8 weeks
Patient will be able to perform functional tasks with independence, using least restrictive device, within 8 weeks.

## 2023-02-03 NOTE — PROGRESS NOTE ADULT - SUBJECTIVE AND OBJECTIVE BOX
Patient is a 72y old  Male who presents with a chief complaint of s/p cardiac arrest, hypoglycemia (02 Feb 2023 13:30)      OVERNIGHT EVENTS:  Patients seen and examined at bedside this morning. No acute events overnight.    REVIEW OF SYSTEMS: unable to obtain due to poor mentation     MEDICATIONS  (STANDING):  apixaban 5 milliGRAM(s) Oral every 12 hours  aspirin  chewable 81 milliGRAM(s) Oral daily  atorvastatin 20 milliGRAM(s) Oral at bedtime  bacitracin   Ointment 1 Application(s) Topical two times a day  bacitracin   Ointment 1 Application(s) Topical two times a day  budesonide 160 MICROgram(s)/formoterol 4.5 MICROgram(s) Inhaler 2 Puff(s) Inhalation two times a day  chlorhexidine 2% Cloths 1 Application(s) Topical <User Schedule>  collagenase Ointment 1 Application(s) Topical two times a day  cyanocobalamin 1000 MICROGram(s) Oral daily  dextrose 5%. 1000 milliLiter(s) (50 mL/Hr) IV Continuous <Continuous>  dextrose 5%. 1000 milliLiter(s) (100 mL/Hr) IV Continuous <Continuous>  dextrose 50% Injectable 25 Gram(s) IV Push once  dextrose 50% Injectable 12.5 Gram(s) IV Push once  dextrose 50% Injectable 25 Gram(s) IV Push once  diphenhydrAMINE Injectable 50 milliGRAM(s) IV Push once  folic acid 1 milliGRAM(s) Oral daily  glucagon  Injectable 1 milliGRAM(s) IntraMuscular once  glycopyrrolate 1 milliGRAM(s) Oral two times a day  insulin glargine Injectable (LANTUS) 10 Unit(s) SubCutaneous at bedtime  insulin lispro (ADMELOG) corrective regimen sliding scale   SubCutaneous every 6 hours  levETIRAcetam 1500 milliGRAM(s) Oral two times a day  metoprolol tartrate 25 milliGRAM(s) Oral two times a day  polyethylene glycol 3350 17 Gram(s) Oral daily  scopolamine 1 mG/72 Hr(s) Patch 1 Patch Transdermal every 72 hours  senna 2 Tablet(s) Oral at bedtime  silver sulfADIAZINE 1% Cream 1 Application(s) Topical two times a day  valproic  acid Syrup 750 milliGRAM(s) Oral <User Schedule>  vitamin A &amp; D Ointment 1 Application(s) Topical two times a day    MEDICATIONS  (PRN):  acetaminophen     Tablet .. 650 milliGRAM(s) Oral every 6 hours PRN Temp greater or equal to 38C (100.4F), Mild Pain (1 - 3)  albuterol/ipratropium for Nebulization 3 milliLiter(s) Nebulizer every 6 hours PRN Shortness of Breath and/or Wheezing  aluminum hydroxide/magnesium hydroxide/simethicone Suspension 30 milliLiter(s) Oral every 4 hours PRN Dyspepsia  dextrose Oral Gel 15 Gram(s) Oral once PRN Blood Glucose LESS THAN 70 milliGRAM(s)/deciliter      Allergies    No Known Allergies    Intolerances        T(F): 97.6 (02-03-23 @ 10:53), Max: 98.6 (02-02-23 @ 17:39)  HR: 72 (02-03-23 @ 10:53) (65 - 87)  BP: 112/60 (02-03-23 @ 10:53) (108/61 - 127/73)  RR: 18 (02-03-23 @ 10:53) (18 - 20)  SpO2: 99% (02-03-23 @ 10:53) (96% - 100%)  Wt(kg): --    PHYSICAL EXAM:  GENERAL: NAD  HEAD:  Atraumatic, Normocephalic  EYES: PERRLA, conjunctiva and sclera clear  ENMT: Moist mucous membranes  NECK: Supple, No JVD, Normal thyroid  NERVOUS SYSTEM:  Alert & Awake  CHEST/LUNG: Clear to percussion bilaterally;   HEART: Regular rate and rhythm;   ABDOMEN: Soft, Nontender, Nondistended; Bowel sounds present  EXTREMITIES:  no edema BL LE  SKIN: moist    LABS:                        10.1   8.81  )-----------( 209      ( 03 Feb 2023 07:32 )             31.8     02-03    141  |  102  |  26<H>  ----------------------------<  134<H>  4.1   |  33<H>  |  0.72    Ca    8.5      03 Feb 2023 07:32    TPro  5.7<L>  /  Alb  2.0<L>  /  TBili  0.3  /  DBili  x   /  AST  9<L>  /  ALT  11<L>  /  AlkPhos  82  02-03        Cultures;   CAPILLARY BLOOD GLUCOSE      POCT Blood Glucose.: 193 mg/dL (03 Feb 2023 11:09)  POCT Blood Glucose.: 143 mg/dL (03 Feb 2023 07:01)  POCT Blood Glucose.: 200 mg/dL (02 Feb 2023 23:59)  POCT Blood Glucose.: 200 mg/dL (02 Feb 2023 22:08)  POCT Blood Glucose.: 133 mg/dL (02 Feb 2023 17:14)    Lipid panel:           RADIOLOGY & ADDITIONAL TESTS:    Imaging Personally Reviewed:  [x ] YES      Consultant(s) Notes Reviewed:  [x ] YES     Care Discussed with [x ] Consultants [X ] Patient [ ] Family  [x ]    [x ]  Other; RN

## 2023-02-03 NOTE — PROGRESS NOTE ADULT - ASSESSMENT
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 ACLS for PEA arrest and ROSC returned after 5 minutes. Cardiac arrest possibly secondary to severe hypoglycemia from eating less and not tracking blood sugar carefully. Hospital course complicated by 1) prolonged hypoxemic respiratory failure. sp trach and peg 2) left upper extremity DVT and placed on eliquis 3) multiple infections (enteroccoal facelis cellutis, infected left hand hematoma, tracheobronchitis secondary to citrobacr 4) tonic clonic seizure - on keppra/depakoate- currently undergoing reevaluation by NEUROLOGY 5) fever of 102 on 1/19. INFECTIOUS DISEASE reconsulted and pan culture ordered     # s/p Cardiac Arrest, acute metabolic encephalopathy  - s/p ACLS w/ ROSC after 5 min   - 2nd degree heart block, s/p PPM   - ECHO Takotsubo cardiomyopathy. EF 50-55%, LVH, mild pHTN    - s/p ICU course; Hemodynamically stable now  - cont w/ Metoprolol, ASA and atorvastatin     # Acute/Chronic Respiratory failure w/ Hypoxia and Hypercapnia/ COPD   - s/p intubation; failed extubation   - Pulmonary on board   - tolerating Trach collar, vent discontinued; continue weaning as per Pulmonary   - cont w/ Symbicort, Duonebs prn  - Surgery on board, s/p Trach change to #4 fenestrated tracheostomy placed 1/30, f/u Capping trial     # Multiple infectious Disease   - trachobronchitis secondary to citrobacter?? - resolved  - enterococcal faecalis cellulitis - resolved   - infected left hand hematoma - s/p bedside debridement 12/13 w/ hand surgery   - monitor off abx     # Occlusive Left subclavian thrombosis   - LUE venous duplex : occlusive thrombus in the left mid subclavian vein with partial slow flow detected in the lateral subclavian vein.  - LUE arterial Duplex neg   - cont w/ Eliquis     Myoclonic Seizure   - EEG: Myoclonic seizure  - Neurology on board   - cont w/ Keppra Depakote    # HTN   - cont w/ Metoprolol     # DM2   - A1c 8.8%  - cont w/ Lantus   - cont w/ FS monitoring w/ insulin s/s coverage     # Cad s/p PCI w/ stent/ CABG  - cont with ASA and Atorvastatin     # Anemia of Chronic Disease  - h/h stable, will cont  to monitor     # Functional quadriplegic   - RITA on discharge   - supportive care    # Stage 4 decubiti sacrum wound  - s/p debridement 12/19  -cont w/ wound care as recommended     # Dysphagia/ Moderate Protein Calorie Malnutrition  - Peg tube in place   - continue with tube feeding at goal as tolerated.     # Constipation  - cont w/ senna, Miralax prn     Dvt ppx: Eliquis   Dipso: Patient has no insurance and Family in process of getting guardianship. Patient will then need placement.     Plan discussed with son at bedside

## 2023-02-03 NOTE — PHYSICAL THERAPY INITIAL EVALUATION ADULT - SKIN INTEGRITY
sacral pressure injury PT wound care initial re-evaluation performed with all wounds documented in flowsheet 2 6.0 A&I.

## 2023-02-03 NOTE — PHYSICAL THERAPY INITIAL EVALUATION ADULT - MODALITIES TREATMENT COMMENTS
See flow sheet for wound assessment Stage IV sacral pressure injury measuring 3x2x0 cm; 75% granulation tissue 25% adherent slough; scant sero-sanguinous drainage, 50% re-epithelialization / 50% maceration to toyin-wound, no mal odor; evidence of improvement of wound measurements from previous assessment

## 2023-02-03 NOTE — PHYSICAL THERAPY INITIAL EVALUATION ADULT - BED MOBILITY LIMITATIONS, REHAB EVAL
decreased ability to use arms for pushing/pulling/decreased ability to use legs for bridging/pushing/impaired ability to control trunk for mobility
decreased ability to use arms for pushing/pulling/decreased ability to use legs for bridging/pushing/impaired ability to control trunk for mobility
decreased ability to use arms for pushing/pulling/decreased ability to use legs for bridging/pushing
decreased ability to use arms for pushing/pulling/decreased ability to use legs for bridging/pushing/impaired ability to control trunk for mobility

## 2023-02-03 NOTE — PHYSICAL THERAPY INITIAL EVALUATION ADULT - IMPAIRMENTS CONTRIBUTING IMPAIRED BED MOBILITY, REHAB EVAL
impaired balance/decreased flexibility/impaired postural control/decreased strength
impaired balance/cognition/impaired motor control/impaired postural control/decreased strength
impaired balance/impaired motor control/impaired postural control/decreased strength
impaired balance/impaired motor control/impaired postural control/decreased strength

## 2023-02-03 NOTE — PHYSICAL THERAPY INITIAL EVALUATION ADULT - PHYSICAL ASSIST/NONPHYSICAL ASSIST: SUPINE/SIT, REHAB EVAL
verbal cues/nonverbal cues (demo/gestures)/2 person assist
1 person assist
verbal cues/nonverbal cues (demo/gestures)/2 person assist

## 2023-02-03 NOTE — OCCUPATIONAL THERAPY INITIAL EVALUATION ADULT - PHYSICAL ASSIST/NONPHYSICAL ASSIST: SCOOT/BRIDGE, REHAB EVAL
3 person assist/set-up required/supervision/verbal cues/nonverbal cues (demo/gestures)
verbal cues/2 person assist

## 2023-02-03 NOTE — PHYSICAL THERAPY INITIAL EVALUATION ADULT - FUNCTIONAL LIMITATIONS, PT EVAL
self-care/home management/community/leisure
self-care/home management
self-care/home management
self-care/home management/community/leisure

## 2023-02-03 NOTE — PHYSICAL THERAPY INITIAL EVALUATION ADULT - PERTINENT HX OF CURRENT PROBLEM, REHAB EVAL
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 ACLS for PEA arrest and ROSC returned after 5 minutes. Cardiac arrest possibly secondary to severe hypoglycemia from eating less and not tracking blood sugar carefully. +Sacral pressure injury noted.

## 2023-02-03 NOTE — PHYSICAL THERAPY INITIAL EVALUATION ADULT - LEVEL OF INDEPENDENCE: SIT/SUPINE, REHAB EVAL
secondary to lethargy/unable to perform
maximum assist (25% patients effort)

## 2023-02-03 NOTE — PROGRESS NOTE ADULT - SUBJECTIVE AND OBJECTIVE BOX
24 hr events  no acute events  doing well with size 4 fenestrated cuffed trach  remains on trach collar around the clock  secretions better today, less suctioning requirements    ROS  [x] limited due to ability to speak with trach in place  denies SOB  no CP  no abdominal pain      MEDICATIONS  (STANDING):  apixaban 5 milliGRAM(s) Oral every 12 hours  aspirin  chewable 81 milliGRAM(s) Oral daily  atorvastatin 20 milliGRAM(s) Oral at bedtime  bacitracin   Ointment 1 Application(s) Topical two times a day  bacitracin   Ointment 1 Application(s) Topical two times a day  budesonide 160 MICROgram(s)/formoterol 4.5 MICROgram(s) Inhaler 2 Puff(s) Inhalation two times a day  chlorhexidine 2% Cloths 1 Application(s) Topical <User Schedule>  collagenase Ointment 1 Application(s) Topical two times a day  cyanocobalamin 1000 MICROGram(s) Oral daily  dextrose 5%. 1000 milliLiter(s) (50 mL/Hr) IV Continuous <Continuous>  dextrose 5%. 1000 milliLiter(s) (100 mL/Hr) IV Continuous <Continuous>  dextrose 50% Injectable 25 Gram(s) IV Push once  dextrose 50% Injectable 12.5 Gram(s) IV Push once  dextrose 50% Injectable 25 Gram(s) IV Push once  diphenhydrAMINE Injectable 50 milliGRAM(s) IV Push once  folic acid 1 milliGRAM(s) Oral daily  glucagon  Injectable 1 milliGRAM(s) IntraMuscular once  glycopyrrolate 1 milliGRAM(s) Oral two times a day  insulin glargine Injectable (LANTUS) 10 Unit(s) SubCutaneous at bedtime  insulin lispro (ADMELOG) corrective regimen sliding scale   SubCutaneous every 6 hours  levETIRAcetam 1500 milliGRAM(s) Oral two times a day  metoprolol tartrate 25 milliGRAM(s) Oral two times a day  polyethylene glycol 3350 17 Gram(s) Oral daily  scopolamine 1 mG/72 Hr(s) Patch 1 Patch Transdermal every 72 hours  senna 2 Tablet(s) Oral at bedtime  silver sulfADIAZINE 1% Cream 1 Application(s) Topical two times a day  valproic  acid Syrup 750 milliGRAM(s) Oral <User Schedule>  vitamin A &amp; D Ointment 1 Application(s) Topical two times a day    MEDICATIONS  (PRN):  acetaminophen     Tablet .. 650 milliGRAM(s) Oral every 6 hours PRN Temp greater or equal to 38C (100.4F), Mild Pain (1 - 3)  albuterol/ipratropium for Nebulization 3 milliLiter(s) Nebulizer every 6 hours PRN Shortness of Breath and/or Wheezing  aluminum hydroxide/magnesium hydroxide/simethicone Suspension 30 milliLiter(s) Oral every 4 hours PRN Dyspepsia  dextrose Oral Gel 15 Gram(s) Oral once PRN Blood Glucose LESS THAN 70 milliGRAM(s)/deciliter      LABS              10.1   8.81  )-----------( 209      ( 03 Feb 2023 07:32 )             31.8     02-03    141  |  102  |  26<H>  ----------------------------<  134<H>  4.1   |  33<H>  |  0.72    Ca    8.5      03 Feb 2023 07:32    TPro  5.7<L>  /  Alb  2.0<L>  /  TBili  0.3  /  DBili  x   /  AST  9<L>  /  ALT  11<L>  /  AlkPhos  82  02-03      IMAGING  CXR < from: Xray Chest 1 View- PORTABLE-Urgent (Xray Chest 1 View- PORTABLE-Urgent .) (01.30.23 @ 19:59) >  Normal cardiac silhouette and normal pulmonary vasculature with no   consolidation, effusion, pneumothorax or acute osseous finding.    Pacemaker with wire tips in the right atrium, right ventricle and   coronary sinus, unchanged.    IMPRESSION:  Tracheostomy tube projected in satisfactory position      VITALS    T(F): 97.6 (02-03-23 @ 10:53), Max: 98.6 (02-02-23 @ 17:39)  HR: 72 (02-03-23 @ 10:53) (65 - 87)  BP: 112/60 (02-03-23 @ 10:53) (108/61 - 127/73)  RR: 18 (02-03-23 @ 10:53) (18 - 20)  SpO2: 99% (02-03-23 @ 10:53) (96% - 100%)        EXAM  GEN: NAD, comfortable in bed, on trach collar  HEENT: NC/AT, EOMI, sclera white, trach in place with clear/white secretions  CV: RRR  PULM: scattered rhonchi bilaterally, clears with tracheal suctioning, no wheeze  ABD: soft, ND, NT, + BS + PEG  EXT: on edema/cyanosis/clubbig  NEURO: awake, writing on paper pad, follows simple commands

## 2023-02-03 NOTE — PROGRESS NOTE ADULT - ASSESSMENT
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  Post arrest noted to have generalized tonic-clonic seizures confirmed on EEG. Started on antiepileptics. Hospital course complicated by failure to wean s/p trach and peg 12/5/22, left upper extremity DVT initiated on eliquis, fevers with multiple infections due to enterococcal faecalis cellulitis, infected left hand hematoma s/p debridement 12/13, citrobacter and enterobacter cloacae tracheobronchitis, ecoli UTI,      DX: cardiac arrest, respiratory arrest / acute hypoxic respiratory failure requiring intubation, failure to wean s/p trach and PEG, new onset tonic clonic seizure, hypoglycemia with underlying DM, ELMER on CKD,with ATN, shock liver (resolved), takosubo cardiomyopathy, left arm DVT, L hand hematoma and cellulitis, tracheobronchitis, UTI    - still with secretions but better today, less suctioning requirements per nursing staff  - secretions clear to white in color  - tolerating size 4 fenestrated cuffed trach (changed/downsized 1/30) due to inability to change to number 6 fenestrated cuffed trach with difficulty inserting number 6 pass stoma  - capping trial held in setting of copious secretions  - if secretions cont to improve can attempt capping next week  - has been off mechanical vent support since 1/23  - cont with trach collar around the clock  - on scopolamine patch for secretions  - currently being monitored off antibiotics  - remains on AC for LUE DVT  - pt awake, alert  - continue PT/OT

## 2023-02-04 LAB
GLUCOSE BLDC GLUCOMTR-MCNC: 166 MG/DL — HIGH (ref 70–99)
GLUCOSE BLDC GLUCOMTR-MCNC: 167 MG/DL — HIGH (ref 70–99)
GLUCOSE BLDC GLUCOMTR-MCNC: 177 MG/DL — HIGH (ref 70–99)
GLUCOSE BLDC GLUCOMTR-MCNC: 180 MG/DL — HIGH (ref 70–99)
GLUCOSE BLDC GLUCOMTR-MCNC: 77 MG/DL — SIGNIFICANT CHANGE UP (ref 70–99)

## 2023-02-04 PROCEDURE — 99232 SBSQ HOSP IP/OBS MODERATE 35: CPT

## 2023-02-04 RX ADMIN — ROBINUL 1 MILLIGRAM(S): 0.2 INJECTION INTRAMUSCULAR; INTRAVENOUS at 17:11

## 2023-02-04 RX ADMIN — INSULIN GLARGINE 10 UNIT(S): 100 INJECTION, SOLUTION SUBCUTANEOUS at 23:26

## 2023-02-04 RX ADMIN — Medication 25 MILLIGRAM(S): at 17:17

## 2023-02-04 RX ADMIN — APIXABAN 5 MILLIGRAM(S): 2.5 TABLET, FILM COATED ORAL at 17:12

## 2023-02-04 RX ADMIN — Medication 1 APPLICATION(S): at 06:12

## 2023-02-04 RX ADMIN — LEVETIRACETAM 1500 MILLIGRAM(S): 250 TABLET, FILM COATED ORAL at 06:11

## 2023-02-04 RX ADMIN — BUDESONIDE AND FORMOTEROL FUMARATE DIHYDRATE 2 PUFF(S): 160; 4.5 AEROSOL RESPIRATORY (INHALATION) at 17:13

## 2023-02-04 RX ADMIN — PREGABALIN 1000 MICROGRAM(S): 225 CAPSULE ORAL at 12:12

## 2023-02-04 RX ADMIN — Medication 1 APPLICATION(S): at 17:12

## 2023-02-04 RX ADMIN — LEVETIRACETAM 1500 MILLIGRAM(S): 250 TABLET, FILM COATED ORAL at 17:12

## 2023-02-04 RX ADMIN — Medication 1 APPLICATION(S): at 05:15

## 2023-02-04 RX ADMIN — SCOPALAMINE 1 PATCH: 1 PATCH, EXTENDED RELEASE TRANSDERMAL at 10:31

## 2023-02-04 RX ADMIN — Medication 1 MILLIGRAM(S): at 12:12

## 2023-02-04 RX ADMIN — Medication 1 APPLICATION(S): at 17:17

## 2023-02-04 RX ADMIN — APIXABAN 5 MILLIGRAM(S): 2.5 TABLET, FILM COATED ORAL at 06:12

## 2023-02-04 RX ADMIN — Medication 750 MILLIGRAM(S): at 08:18

## 2023-02-04 RX ADMIN — Medication 1: at 12:13

## 2023-02-04 RX ADMIN — Medication 1 APPLICATION(S): at 17:13

## 2023-02-04 RX ADMIN — BUDESONIDE AND FORMOTEROL FUMARATE DIHYDRATE 2 PUFF(S): 160; 4.5 AEROSOL RESPIRATORY (INHALATION) at 06:13

## 2023-02-04 RX ADMIN — Medication 81 MILLIGRAM(S): at 12:12

## 2023-02-04 RX ADMIN — CHLORHEXIDINE GLUCONATE 1 APPLICATION(S): 213 SOLUTION TOPICAL at 06:11

## 2023-02-04 RX ADMIN — ROBINUL 1 MILLIGRAM(S): 0.2 INJECTION INTRAMUSCULAR; INTRAVENOUS at 06:13

## 2023-02-04 RX ADMIN — ATORVASTATIN CALCIUM 20 MILLIGRAM(S): 80 TABLET, FILM COATED ORAL at 21:17

## 2023-02-04 RX ADMIN — Medication 1: at 00:47

## 2023-02-04 RX ADMIN — SCOPALAMINE 1 PATCH: 1 PATCH, EXTENDED RELEASE TRANSDERMAL at 05:17

## 2023-02-04 RX ADMIN — SCOPALAMINE 1 PATCH: 1 PATCH, EXTENDED RELEASE TRANSDERMAL at 19:30

## 2023-02-04 RX ADMIN — Medication 1 APPLICATION(S): at 17:16

## 2023-02-04 RX ADMIN — Medication 1: at 23:26

## 2023-02-04 RX ADMIN — Medication 25 MILLIGRAM(S): at 06:13

## 2023-02-04 RX ADMIN — Medication 750 MILLIGRAM(S): at 21:17

## 2023-02-04 RX ADMIN — Medication 1 APPLICATION(S): at 06:24

## 2023-02-04 NOTE — PROGRESS NOTE ADULT - ASSESSMENT
71 yo male w/ pmhx of COPD, CAD sp PCI w/ stent, CABG 2014, HF w/ PeF, 2nd degree heart block, sp PPM, HTN, DM, CKD Stage 3, CVA- lacunar infarcts admitted to ICU originally for cardiac arrest. ACLS for PEA arrest and ROSC returned after 5 minutes. Cardiac arrest possibly secondary to severe hypoglycemia from eating less and not tracking blood sugar carefully. Hospital course complicated by 1) prolonged hypoxemic respiratory failure. sp trach and peg 2) left upper extremity DVT and placed on eliquis 3) multiple infections (enteroccoal facelis cellutis, infected left hand hematoma, tracheobronchitis secondary to citrobacr 4) tonic clonic seizure - on keppra/depakoate- currently undergoing reevaluation by NEUROLOGY 5) fever of 102 on 1/19. INFECTIOUS DISEASE reconsulted and pan culture ordered     # s/p Cardiac Arrest, acute metabolic encephalopathy  - s/p ACLS w/ ROSC after 5 min   - 2nd degree heart block, s/p PPM   - ECHO Takotsubo cardiomyopathy. EF 50-55%, LVH, mild pHTN    - s/p ICU course; Hemodynamically stable now  - cont w/ Metoprolol, ASA and atorvastatin     # Acute/Chronic Respiratory failure w/ Hypoxia and Hypercapnia/ COPD   - s/p intubation; failed extubation   - Pulmonary on board   - tolerating Trach collar, vent discontinued; continue weaning as per Pulmonary   - cont w/ Symbicort, Duonebs prn  - Surgery on board, s/p Trach change to #4 fenestrated tracheostomy placed 1/30, f/u Capping trial     # Multiple infectious Disease   - trachobronchitis secondary to citrobacter?? - resolved  - enterococcal faecalis cellulitis - resolved   - infected left hand hematoma - s/p bedside debridement 12/13 w/ hand surgery   - monitor off abx     # Occlusive Left subclavian thrombosis   - LUE venous duplex : occlusive thrombus in the left mid subclavian vein with partial slow flow detected in the lateral subclavian vein.  - LUE arterial Duplex neg   - cont w/ Eliquis     Myoclonic Seizure   - EEG: Myoclonic seizure  - Neurology on board   - cont w/ Keppra Depakote    # HTN   - cont w/ Metoprolol     # DM2   - A1c 8.8%  - cont w/ Lantus   - cont w/ FS monitoring w/ insulin s/s coverage     # Cad s/p PCI w/ stent/ CABG  - cont with ASA and Atorvastatin     # Anemia of Chronic Disease  - h/h stable, will cont  to monitor     # Functional quadriplegic   - RITA on discharge   - supportive care    # Stage 4 decubiti sacrum wound  - s/p debridement 12/19  -cont w/ wound care as recommended     # Dysphagia/ Moderate Protein Calorie Malnutrition  - Peg tube in place   - continue with tube feeding at goal as tolerated.     # Constipation  - cont w/ senna, Miralax prn     Dvt ppx: Eliquis   Dipso: Patient has no insurance and Family in process of getting guardianship. Patient will then need placement.     Plan discussed with son at bedside

## 2023-02-04 NOTE — PROGRESS NOTE ADULT - SUBJECTIVE AND OBJECTIVE BOX
Patient is a 72y old  Male who presents with a chief complaint of s/p cardiac arrest, hypoglycemia (03 Feb 2023 13:50)      OVERNIGHT EVENTS:  Patients seen and examined at bedside this morning. No acute events overnight.    REVIEW OF SYSTEMS: denies chest pain/SOB, diaphoresis, no F/C, cough, dizziness, headache, blurry vision, nausea, vomiting, abdominal pain. All others review of systems negative     MEDICATIONS  (STANDING):  apixaban 5 milliGRAM(s) Oral every 12 hours  aspirin  chewable 81 milliGRAM(s) Oral daily  atorvastatin 20 milliGRAM(s) Oral at bedtime  bacitracin   Ointment 1 Application(s) Topical two times a day  bacitracin   Ointment 1 Application(s) Topical two times a day  budesonide 160 MICROgram(s)/formoterol 4.5 MICROgram(s) Inhaler 2 Puff(s) Inhalation two times a day  chlorhexidine 2% Cloths 1 Application(s) Topical <User Schedule>  collagenase Ointment 1 Application(s) Topical two times a day  cyanocobalamin 1000 MICROGram(s) Oral daily  dextrose 5%. 1000 milliLiter(s) (50 mL/Hr) IV Continuous <Continuous>  dextrose 5%. 1000 milliLiter(s) (100 mL/Hr) IV Continuous <Continuous>  dextrose 50% Injectable 25 Gram(s) IV Push once  dextrose 50% Injectable 12.5 Gram(s) IV Push once  dextrose 50% Injectable 25 Gram(s) IV Push once  diphenhydrAMINE Injectable 50 milliGRAM(s) IV Push once  folic acid 1 milliGRAM(s) Oral daily  glucagon  Injectable 1 milliGRAM(s) IntraMuscular once  glycopyrrolate 1 milliGRAM(s) Oral two times a day  insulin glargine Injectable (LANTUS) 10 Unit(s) SubCutaneous at bedtime  insulin lispro (ADMELOG) corrective regimen sliding scale   SubCutaneous every 6 hours  levETIRAcetam 1500 milliGRAM(s) Oral two times a day  metoprolol tartrate 25 milliGRAM(s) Oral two times a day  polyethylene glycol 3350 17 Gram(s) Oral daily  scopolamine 1 mG/72 Hr(s) Patch 1 Patch Transdermal every 72 hours  senna 2 Tablet(s) Oral at bedtime  silver sulfADIAZINE 1% Cream 1 Application(s) Topical two times a day  valproic  acid Syrup 750 milliGRAM(s) Oral <User Schedule>  vitamin A &amp; D Ointment 1 Application(s) Topical two times a day    MEDICATIONS  (PRN):  acetaminophen     Tablet .. 650 milliGRAM(s) Oral every 6 hours PRN Temp greater or equal to 38C (100.4F), Mild Pain (1 - 3)  albuterol/ipratropium for Nebulization 3 milliLiter(s) Nebulizer every 6 hours PRN Shortness of Breath and/or Wheezing  aluminum hydroxide/magnesium hydroxide/simethicone Suspension 30 milliLiter(s) Oral every 4 hours PRN Dyspepsia  dextrose Oral Gel 15 Gram(s) Oral once PRN Blood Glucose LESS THAN 70 milliGRAM(s)/deciliter      Allergies    No Known Allergies    Intolerances        T(F): 97.9 (02-04-23 @ 10:54), Max: 99.1 (02-03-23 @ 16:23)  HR: 97 (02-04-23 @ 10:54) (58 - 97)  BP: 120/72 (02-04-23 @ 10:54) (113/69 - 127/77)  RR: 18 (02-04-23 @ 10:54) (18 - 18)  SpO2: 100% (02-04-23 @ 10:54) (96% - 100%)  Wt(kg): --    PHYSICAL EXAM:  GENERAL: NAD  HEAD:  Atraumatic, Normocephalic  EYES: PERRLA, conjunctiva and sclera clear  ENMT: Moist mucous membranes  NECK: Supple, No JVD, Normal thyroid  NERVOUS SYSTEM:  Alert & Awake  CHEST/LUNG: Clear to percussion bilaterally;   HEART: Regular rate and rhythm;   ABDOMEN: Soft, Nontender, Nondistended; Bowel sounds present  EXTREMITIES:  no edema BL LE  SKIN: moist    LABS:                        10.1   8.81  )-----------( 209      ( 03 Feb 2023 07:32 )             31.8     02-03    141  |  102  |  26<H>  ----------------------------<  134<H>  4.1   |  33<H>  |  0.72    Ca    8.5      03 Feb 2023 07:32    TPro  5.7<L>  /  Alb  2.0<L>  /  TBili  0.3  /  DBili  x   /  AST  9<L>  /  ALT  11<L>  /  AlkPhos  82  02-03        Cultures;   CAPILLARY BLOOD GLUCOSE      POCT Blood Glucose.: 167 mg/dL (04 Feb 2023 12:04)  POCT Blood Glucose.: 166 mg/dL (04 Feb 2023 06:12)  POCT Blood Glucose.: 177 mg/dL (04 Feb 2023 00:33)  POCT Blood Glucose.: 155 mg/dL (03 Feb 2023 21:37)  POCT Blood Glucose.: 151 mg/dL (03 Feb 2023 17:44)    Lipid panel:           RADIOLOGY & ADDITIONAL TESTS:    Imaging Personally Reviewed:  [x ] YES      Consultant(s) Notes Reviewed:  [x ] YES     Care Discussed with [x ] Consultants [X ] Patient [ ] Family  [x ]    [x ]  Other; RN

## 2023-02-05 LAB
GLUCOSE BLDC GLUCOMTR-MCNC: 150 MG/DL — HIGH (ref 70–99)
GLUCOSE BLDC GLUCOMTR-MCNC: 155 MG/DL — HIGH (ref 70–99)
GLUCOSE BLDC GLUCOMTR-MCNC: 161 MG/DL — HIGH (ref 70–99)
GLUCOSE BLDC GLUCOMTR-MCNC: 162 MG/DL — HIGH (ref 70–99)

## 2023-02-05 PROCEDURE — 99232 SBSQ HOSP IP/OBS MODERATE 35: CPT

## 2023-02-05 RX ADMIN — SENNA PLUS 2 TABLET(S): 8.6 TABLET ORAL at 21:13

## 2023-02-05 RX ADMIN — Medication 1 APPLICATION(S): at 05:11

## 2023-02-05 RX ADMIN — CHLORHEXIDINE GLUCONATE 1 APPLICATION(S): 213 SOLUTION TOPICAL at 05:09

## 2023-02-05 RX ADMIN — Medication 1 MILLIGRAM(S): at 11:59

## 2023-02-05 RX ADMIN — Medication 750 MILLIGRAM(S): at 07:40

## 2023-02-05 RX ADMIN — Medication 1 APPLICATION(S): at 17:30

## 2023-02-05 RX ADMIN — Medication 3 MILLILITER(S): at 18:08

## 2023-02-05 RX ADMIN — BUDESONIDE AND FORMOTEROL FUMARATE DIHYDRATE 2 PUFF(S): 160; 4.5 AEROSOL RESPIRATORY (INHALATION) at 05:10

## 2023-02-05 RX ADMIN — Medication 25 MILLIGRAM(S): at 05:10

## 2023-02-05 RX ADMIN — Medication 1 APPLICATION(S): at 05:10

## 2023-02-05 RX ADMIN — Medication 1: at 05:09

## 2023-02-05 RX ADMIN — PREGABALIN 1000 MICROGRAM(S): 225 CAPSULE ORAL at 11:59

## 2023-02-05 RX ADMIN — SCOPALAMINE 1 PATCH: 1 PATCH, EXTENDED RELEASE TRANSDERMAL at 23:10

## 2023-02-05 RX ADMIN — APIXABAN 5 MILLIGRAM(S): 2.5 TABLET, FILM COATED ORAL at 17:24

## 2023-02-05 RX ADMIN — POLYETHYLENE GLYCOL 3350 17 GRAM(S): 17 POWDER, FOR SOLUTION ORAL at 11:59

## 2023-02-05 RX ADMIN — Medication 1 APPLICATION(S): at 17:34

## 2023-02-05 RX ADMIN — LEVETIRACETAM 1500 MILLIGRAM(S): 250 TABLET, FILM COATED ORAL at 05:09

## 2023-02-05 RX ADMIN — ATORVASTATIN CALCIUM 20 MILLIGRAM(S): 80 TABLET, FILM COATED ORAL at 21:14

## 2023-02-05 RX ADMIN — ROBINUL 1 MILLIGRAM(S): 0.2 INJECTION INTRAMUSCULAR; INTRAVENOUS at 05:10

## 2023-02-05 RX ADMIN — SCOPALAMINE 1 PATCH: 1 PATCH, EXTENDED RELEASE TRANSDERMAL at 07:33

## 2023-02-05 RX ADMIN — SCOPALAMINE 1 PATCH: 1 PATCH, EXTENDED RELEASE TRANSDERMAL at 23:35

## 2023-02-05 RX ADMIN — Medication 1 APPLICATION(S): at 17:31

## 2023-02-05 RX ADMIN — APIXABAN 5 MILLIGRAM(S): 2.5 TABLET, FILM COATED ORAL at 05:09

## 2023-02-05 RX ADMIN — Medication 1: at 23:24

## 2023-02-05 RX ADMIN — Medication 750 MILLIGRAM(S): at 21:13

## 2023-02-05 RX ADMIN — BUDESONIDE AND FORMOTEROL FUMARATE DIHYDRATE 2 PUFF(S): 160; 4.5 AEROSOL RESPIRATORY (INHALATION) at 17:23

## 2023-02-05 RX ADMIN — Medication 25 MILLIGRAM(S): at 17:24

## 2023-02-05 RX ADMIN — Medication 1: at 12:30

## 2023-02-05 RX ADMIN — INSULIN GLARGINE 10 UNIT(S): 100 INJECTION, SOLUTION SUBCUTANEOUS at 23:12

## 2023-02-05 RX ADMIN — SCOPALAMINE 1 PATCH: 1 PATCH, EXTENDED RELEASE TRANSDERMAL at 19:12

## 2023-02-05 RX ADMIN — LEVETIRACETAM 1500 MILLIGRAM(S): 250 TABLET, FILM COATED ORAL at 17:24

## 2023-02-05 RX ADMIN — Medication 81 MILLIGRAM(S): at 11:59

## 2023-02-05 RX ADMIN — ROBINUL 1 MILLIGRAM(S): 0.2 INJECTION INTRAMUSCULAR; INTRAVENOUS at 17:24

## 2023-02-05 RX ADMIN — Medication 1 APPLICATION(S): at 17:25

## 2023-02-05 NOTE — PROGRESS NOTE ADULT - SUBJECTIVE AND OBJECTIVE BOX
Patient is a 72y old  Male who presents with a chief complaint of s/p cardiac arrest, hypoglycemia (04 Feb 2023 13:38)      OVERNIGHT EVENTS:  Patients seen and examined at bedside this morning. No acute events overnight.    REVIEW OF SYSTEMS: unable to obtain due to poor mentation     MEDICATIONS  (STANDING):  apixaban 5 milliGRAM(s) Oral every 12 hours  aspirin  chewable 81 milliGRAM(s) Oral daily  atorvastatin 20 milliGRAM(s) Oral at bedtime  bacitracin   Ointment 1 Application(s) Topical two times a day  bacitracin   Ointment 1 Application(s) Topical two times a day  budesonide 160 MICROgram(s)/formoterol 4.5 MICROgram(s) Inhaler 2 Puff(s) Inhalation two times a day  chlorhexidine 2% Cloths 1 Application(s) Topical <User Schedule>  collagenase Ointment 1 Application(s) Topical two times a day  cyanocobalamin 1000 MICROGram(s) Oral daily  dextrose 5%. 1000 milliLiter(s) (50 mL/Hr) IV Continuous <Continuous>  dextrose 5%. 1000 milliLiter(s) (100 mL/Hr) IV Continuous <Continuous>  dextrose 50% Injectable 25 Gram(s) IV Push once  dextrose 50% Injectable 12.5 Gram(s) IV Push once  dextrose 50% Injectable 25 Gram(s) IV Push once  diphenhydrAMINE Injectable 50 milliGRAM(s) IV Push once  folic acid 1 milliGRAM(s) Oral daily  glucagon  Injectable 1 milliGRAM(s) IntraMuscular once  glycopyrrolate 1 milliGRAM(s) Oral two times a day  insulin glargine Injectable (LANTUS) 10 Unit(s) SubCutaneous at bedtime  insulin lispro (ADMELOG) corrective regimen sliding scale   SubCutaneous every 6 hours  levETIRAcetam 1500 milliGRAM(s) Oral two times a day  metoprolol tartrate 25 milliGRAM(s) Oral two times a day  polyethylene glycol 3350 17 Gram(s) Oral daily  scopolamine 1 mG/72 Hr(s) Patch 1 Patch Transdermal every 72 hours  senna 2 Tablet(s) Oral at bedtime  silver sulfADIAZINE 1% Cream 1 Application(s) Topical two times a day  valproic  acid Syrup 750 milliGRAM(s) Oral <User Schedule>  vitamin A &amp; D Ointment 1 Application(s) Topical two times a day    MEDICATIONS  (PRN):  acetaminophen     Tablet .. 650 milliGRAM(s) Oral every 6 hours PRN Temp greater or equal to 38C (100.4F), Mild Pain (1 - 3)  albuterol/ipratropium for Nebulization 3 milliLiter(s) Nebulizer every 6 hours PRN Shortness of Breath and/or Wheezing  aluminum hydroxide/magnesium hydroxide/simethicone Suspension 30 milliLiter(s) Oral every 4 hours PRN Dyspepsia  dextrose Oral Gel 15 Gram(s) Oral once PRN Blood Glucose LESS THAN 70 milliGRAM(s)/deciliter      Allergies    No Known Allergies    Intolerances        T(F): 98 (02-05-23 @ 04:39), Max: 98.7 (02-04-23 @ 17:05)  HR: 66 (02-05-23 @ 04:39) (63 - 97)  BP: 114/69 (02-05-23 @ 04:39) (113/68 - 120/72)  RR: 18 (02-05-23 @ 09:39) (18 - 18)  SpO2: 99% (02-05-23 @ 09:39) (98% - 100%)  Wt(kg): --    PHYSICAL EXAM:  GENERAL: NAD  HEAD:  Atraumatic, Normocephalic  EYES: PERRLA, conjunctiva and sclera clear  ENMT: Moist mucous membranes  NECK: Supple, No JVD, Normal thyroid  NERVOUS SYSTEM:  Alert & Awake  CHEST/LUNG: Clear to percussion bilaterally;   HEART: Regular rate and rhythm;   ABDOMEN: Soft, Nontender, Nondistended; Bowel sounds present  EXTREMITIES:  no edema BL LE  SKIN: moist    LABS:              Cultures;   CAPILLARY BLOOD GLUCOSE      POCT Blood Glucose.: 161 mg/dL (05 Feb 2023 05:06)  POCT Blood Glucose.: 180 mg/dL (04 Feb 2023 23:25)  POCT Blood Glucose.: 77 mg/dL (04 Feb 2023 17:20)  POCT Blood Glucose.: 167 mg/dL (04 Feb 2023 12:04)    Lipid panel:           RADIOLOGY & ADDITIONAL TESTS:    Imaging Personally Reviewed:  [x ] YES      Consultant(s) Notes Reviewed:  [x ] YES     Care Discussed with [x ] Consultants [X ] Patient [ ] Family  [x ]    [x ]  Other; RN

## 2023-02-05 NOTE — PROGRESS NOTE ADULT - ASSESSMENT
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 ACLS for PEA arrest and ROSC returned after 5 minutes. Cardiac arrest possibly secondary to severe hypoglycemia from eating less and not tracking blood sugar carefully. Hospital course complicated by 1) prolonged hypoxemic respiratory failure. sp trach and peg 2) left upper extremity DVT and placed on eliquis 3) multiple infections (enteroccoal facelis cellutis, infected left hand hematoma, tracheobronchitis secondary to citrobacr 4) tonic clonic seizure - on keppra/depakoate- currently undergoing reevaluation by NEUROLOGY 5) fever of 102 on 1/19. INFECTIOUS DISEASE reconsulted and pan culture ordered     # s/p Cardiac Arrest, acute metabolic encephalopathy  - s/p ACLS w/ ROSC after 5 min   - 2nd degree heart block, s/p PPM   - ECHO Takotsubo cardiomyopathy. EF 50-55%, LVH, mild pHTN    - s/p ICU course; Hemodynamically stable now  - cont w/ Metoprolol, ASA and atorvastatin     # Acute/Chronic Respiratory failure w/ Hypoxia and Hypercapnia/ COPD   - s/p intubation; failed extubation   - Pulmonary on board   - tolerating Trach collar, vent discontinued; continue weaning as per Pulmonary   - cont w/ Symbicort, Duonebs prn  - Surgery on board, s/p Trach change to #4 fenestrated tracheostomy placed 1/30, f/u Capping trial     # Multiple infectious Disease   - trachobronchitis secondary to citrobacter?? - resolved  - enterococcal faecalis cellulitis - resolved   - infected left hand hematoma - s/p bedside debridement 12/13 w/ hand surgery   - monitor off abx     # Occlusive Left subclavian thrombosis   - LUE venous duplex : occlusive thrombus in the left mid subclavian vein with partial slow flow detected in the lateral subclavian vein.  - LUE arterial Duplex neg   - cont w/ Eliquis     Myoclonic Seizure   - EEG: Myoclonic seizure  - Neurology on board   - cont w/ Keppra Depakote    # HTN   - cont w/ Metoprolol     # DM2   - A1c 8.8%  - cont w/ Lantus   - cont w/ FS monitoring w/ insulin s/s coverage     # Cad s/p PCI w/ stent/ CABG  - cont with ASA and Atorvastatin     # Anemia of Chronic Disease  - h/h stable, will cont  to monitor     # Functional quadriplegic   - RITA on discharge   - supportive care    # Stage 4 decubiti sacrum wound  - s/p debridement 12/19  -cont w/ wound care as recommended     # Dysphagia/ Moderate Protein Calorie Malnutrition  - Peg tube in place   - continue with tube feeding at goal as tolerated.     # Constipation  - cont w/ senna, Miralax prn     Dvt ppx: Eliquis   Dipso: Patient has no insurance and Family in process of getting guardianship. Patient will then need placement.     Plan discussed with son at bedside          Have You Had Previous Labs For This Condition?: has had previous labs When Was Your Last Blood Work Done?: 07/19/18

## 2023-02-06 LAB
GLUCOSE BLDC GLUCOMTR-MCNC: 101 MG/DL — HIGH (ref 70–99)
GLUCOSE BLDC GLUCOMTR-MCNC: 147 MG/DL — HIGH (ref 70–99)
GLUCOSE BLDC GLUCOMTR-MCNC: 152 MG/DL — HIGH (ref 70–99)
GLUCOSE BLDC GLUCOMTR-MCNC: 215 MG/DL — HIGH (ref 70–99)

## 2023-02-06 PROCEDURE — 99231 SBSQ HOSP IP/OBS SF/LOW 25: CPT

## 2023-02-06 PROCEDURE — 99232 SBSQ HOSP IP/OBS MODERATE 35: CPT

## 2023-02-06 RX ADMIN — LEVETIRACETAM 1500 MILLIGRAM(S): 250 TABLET, FILM COATED ORAL at 17:13

## 2023-02-06 RX ADMIN — ATORVASTATIN CALCIUM 20 MILLIGRAM(S): 80 TABLET, FILM COATED ORAL at 21:06

## 2023-02-06 RX ADMIN — CHLORHEXIDINE GLUCONATE 1 APPLICATION(S): 213 SOLUTION TOPICAL at 05:25

## 2023-02-06 RX ADMIN — Medication 1 APPLICATION(S): at 05:23

## 2023-02-06 RX ADMIN — Medication 1 APPLICATION(S): at 06:04

## 2023-02-06 RX ADMIN — Medication 1 APPLICATION(S): at 17:15

## 2023-02-06 RX ADMIN — Medication 1 APPLICATION(S): at 05:22

## 2023-02-06 RX ADMIN — APIXABAN 5 MILLIGRAM(S): 2.5 TABLET, FILM COATED ORAL at 05:24

## 2023-02-06 RX ADMIN — Medication 1 MILLIGRAM(S): at 12:06

## 2023-02-06 RX ADMIN — APIXABAN 5 MILLIGRAM(S): 2.5 TABLET, FILM COATED ORAL at 17:14

## 2023-02-06 RX ADMIN — SCOPALAMINE 1 PATCH: 1 PATCH, EXTENDED RELEASE TRANSDERMAL at 19:23

## 2023-02-06 RX ADMIN — Medication 81 MILLIGRAM(S): at 12:06

## 2023-02-06 RX ADMIN — POLYETHYLENE GLYCOL 3350 17 GRAM(S): 17 POWDER, FOR SOLUTION ORAL at 12:06

## 2023-02-06 RX ADMIN — Medication 25 MILLIGRAM(S): at 05:24

## 2023-02-06 RX ADMIN — Medication 1 APPLICATION(S): at 17:14

## 2023-02-06 RX ADMIN — ROBINUL 1 MILLIGRAM(S): 0.2 INJECTION INTRAMUSCULAR; INTRAVENOUS at 05:24

## 2023-02-06 RX ADMIN — Medication 750 MILLIGRAM(S): at 08:48

## 2023-02-06 RX ADMIN — ROBINUL 1 MILLIGRAM(S): 0.2 INJECTION INTRAMUSCULAR; INTRAVENOUS at 17:13

## 2023-02-06 RX ADMIN — LEVETIRACETAM 1500 MILLIGRAM(S): 250 TABLET, FILM COATED ORAL at 05:24

## 2023-02-06 RX ADMIN — Medication 1: at 05:25

## 2023-02-06 RX ADMIN — BUDESONIDE AND FORMOTEROL FUMARATE DIHYDRATE 2 PUFF(S): 160; 4.5 AEROSOL RESPIRATORY (INHALATION) at 06:03

## 2023-02-06 RX ADMIN — Medication 1 APPLICATION(S): at 17:17

## 2023-02-06 RX ADMIN — Medication 2: at 23:07

## 2023-02-06 RX ADMIN — SENNA PLUS 2 TABLET(S): 8.6 TABLET ORAL at 21:06

## 2023-02-06 RX ADMIN — Medication 1 APPLICATION(S): at 05:25

## 2023-02-06 RX ADMIN — Medication 750 MILLIGRAM(S): at 21:06

## 2023-02-06 RX ADMIN — SCOPALAMINE 1 PATCH: 1 PATCH, EXTENDED RELEASE TRANSDERMAL at 08:01

## 2023-02-06 RX ADMIN — BUDESONIDE AND FORMOTEROL FUMARATE DIHYDRATE 2 PUFF(S): 160; 4.5 AEROSOL RESPIRATORY (INHALATION) at 17:15

## 2023-02-06 RX ADMIN — INSULIN GLARGINE 10 UNIT(S): 100 INJECTION, SOLUTION SUBCUTANEOUS at 23:06

## 2023-02-06 RX ADMIN — PREGABALIN 1000 MICROGRAM(S): 225 CAPSULE ORAL at 12:06

## 2023-02-06 NOTE — PROGRESS NOTE ADULT - SUBJECTIVE AND OBJECTIVE BOX
BRANDON MÉNDEZL    [facility] [address]    Allergies    No Known Allergies    Intolerances        PAST MEDICAL & SURGICAL HISTORY:  HTN (hypertension)      HLD (hyperlipidemia)      Diabetes mellitus      Lacunar infarction      BPH (benign prostatic hyperplasia)      CHF (congestive heart failure)      Chronic obstructive pulmonary disease, unspecified COPD type      S/P CABG (coronary artery bypass graft)  2014          FAMILY HISTORY:      Home Medications:  aspirin 81 mg oral delayed release tablet: 1 tab(s) orally once a day (12 Jun 2020 17:35)  Liquifilm Tears preserved ophthalmic solution: 1 drop(s) to each affected eye 2 times a day (12 Jun 2020 17:35)      MEDICATIONS  (STANDING):  apixaban 5 milliGRAM(s) Oral every 12 hours  aspirin  chewable 81 milliGRAM(s) Oral daily  atorvastatin 20 milliGRAM(s) Oral at bedtime  bacitracin   Ointment 1 Application(s) Topical two times a day  bacitracin   Ointment 1 Application(s) Topical two times a day  budesonide 160 MICROgram(s)/formoterol 4.5 MICROgram(s) Inhaler 2 Puff(s) Inhalation two times a day  chlorhexidine 2% Cloths 1 Application(s) Topical <User Schedule>  collagenase Ointment 1 Application(s) Topical two times a day  cyanocobalamin 1000 MICROGram(s) Oral daily  dextrose 5%. 1000 milliLiter(s) (100 mL/Hr) IV Continuous <Continuous>  dextrose 5%. 1000 milliLiter(s) (50 mL/Hr) IV Continuous <Continuous>  dextrose 50% Injectable 25 Gram(s) IV Push once  dextrose 50% Injectable 12.5 Gram(s) IV Push once  dextrose 50% Injectable 25 Gram(s) IV Push once  diphenhydrAMINE Injectable 50 milliGRAM(s) IV Push once  folic acid 1 milliGRAM(s) Oral daily  glucagon  Injectable 1 milliGRAM(s) IntraMuscular once  glycopyrrolate 1 milliGRAM(s) Oral two times a day  insulin glargine Injectable (LANTUS) 10 Unit(s) SubCutaneous at bedtime  insulin lispro (ADMELOG) corrective regimen sliding scale   SubCutaneous every 6 hours  levETIRAcetam 1500 milliGRAM(s) Oral two times a day  metoprolol tartrate 25 milliGRAM(s) Oral two times a day  polyethylene glycol 3350 17 Gram(s) Oral daily  scopolamine 1 mG/72 Hr(s) Patch 1 Patch Transdermal every 72 hours  senna 2 Tablet(s) Oral at bedtime  silver sulfADIAZINE 1% Cream 1 Application(s) Topical two times a day  valproic  acid Syrup 750 milliGRAM(s) Oral <User Schedule>  vitamin A &amp; D Ointment 1 Application(s) Topical two times a day    MEDICATIONS  (PRN):  acetaminophen     Tablet .. 650 milliGRAM(s) Oral every 6 hours PRN Temp greater or equal to 38C (100.4F), Mild Pain (1 - 3)  albuterol/ipratropium for Nebulization 3 milliLiter(s) Nebulizer every 6 hours PRN Shortness of Breath and/or Wheezing  aluminum hydroxide/magnesium hydroxide/simethicone Suspension 30 milliLiter(s) Oral every 4 hours PRN Dyspepsia  dextrose Oral Gel 15 Gram(s) Oral once PRN Blood Glucose LESS THAN 70 milliGRAM(s)/deciliter      Diet, NPO with Tube Feed:   Tube Feeding Modality: Gastrostomy  Glucerna 1.2 Pedrito  Total Volume for 24 Hours (mL): 1440  Continuous  Until Goal Tube Feed Rate (mL per Hour): 60  Tube Feed Duration (in Hours): 24  Tube Feed Start Time: 14:30  Free Water Flush  Free Water Flush Instructions:  30 ml/hr via pump  Liquid Protein Supplement     Qty per Day:  1 (01-09-23 @ 13:45) [Active]          Vital Signs Last 24 Hrs  T(C): 36.2 (06 Feb 2023 04:36), Max: 37.1 (05 Feb 2023 16:55)  T(F): 97.2 (06 Feb 2023 04:36), Max: 98.8 (05 Feb 2023 16:55)  HR: 68 (06 Feb 2023 06:24) (64 - 78)  BP: 115/72 (06 Feb 2023 04:36) (112/71 - 129/74)  BP(mean): --  RR: 16 (06 Feb 2023 06:24) (16 - 18)  SpO2: 99% (06 Feb 2023 06:24) (97% - 100%)    Parameters below as of 06 Feb 2023 06:24  Patient On (Oxygen Delivery Method): tracheostomy collar  O2 Flow (L/min): 6  O2 Concentration (%): 35      02-05-23 @ 07:01  -  02-06-23 @ 07:00  --------------------------------------------------------  IN: 1030 mL / OUT: 0 mL / NET: 1030 mL              LABS:                    WBC:  WBC Count: 8.81 K/uL (02-03 @ 07:32)      MICROBIOLOGY:  RECENT CULTURES:                  Sodium:  Sodium, Serum: 141 mmol/L (02-03 @ 07:32)      0.72 mg/dL 02-03 @ 07:32      Hemoglobin:  Hemoglobin: 10.1 g/dL (02-03 @ 07:32)      Platelets: Platelet Count - Automated: 209 K/uL (02-03 @ 07:32)              RADIOLOGY & ADDITIONAL STUDIES:      MICROBIOLOGY:  RECENT CULTURES:

## 2023-02-06 NOTE — PROGRESS NOTE ADULT - ASSESSMENT
REVIEW OF SYMPTOMS      Able to give (reliable) ROS  NO     PHYSICAL EXAM    HEENT Unremarkable  atraumatic   RESP Fair air entry EXP prolonged    Harsh breath sound Resp distres mild   CARDIAC S1 S2 No S3     NO JVD    ABDOMEN SOFT BS PRESENT NOT DISTENDED No hepatosplenomegaly   PEDAL EDEMA present No calf tenderness  NO rash       GENERAL DATA .   GOC.  12/11/2022 full code       ALLGY.  nka                            WT. ..  12/2/2022 86  BMI. ..    12/2/2022 29                          ICU STAY.  .. 11/29-12/9  COVID.   .. 12/2/2022 scv2 (-)   .. 11/20/2022 scv2 (-)   BEST PRACTICE ISSUES.    HOB ELEVATN. Yes  DVT PPLX.    .. 1/3/2023 apixa 5.2 (vte)  12/12 l Subclav throm  ALEJO PPLX.   ..      INFN PPLX.   .. 11/25 chlorhex .12%   .. 11/14 chlorhex 2%   SP SW ANTWAN.         DIET.    ..  12/15 glucerna 1.2 1440 gt   IV fl.  ..            PROCEDURES.  .. 12/19 debridement of sacral wound   .. 12/5/2022 trach  .. 12/5/2022 peg   .. 12/12 r arm midline   .. 1/28  size 4 fenestrated cuffed trach placed by surgery       ABGS.  1/28/2023 afeb 70 100/60   1/28/2023 tc 97%  1/6/2023 ac 16/450/.3/5 746/49/123     VS/ PO/IO/ VENT/ DRIPS.  2/6/2023 afeb 90 100/60     PATIENT PRESENTATION.  72 m doa 11/14/2022 cac    PMH  PMH CAD s/p PCI with stent 2015 and CABG 2014,   PMH  s/p medtronic PPM,   PMH CVA (lacunar infarct)    HOSPITAL COURSE   .. 1) PEA arrest with ROSC after approximately 5 min 11/14  Now communicative   .. 2) DVT 12/12 l Subclav thromS 12/15/2022 lvnx 80.2 1/3 changed to apixaban 5.2  .. 3) TRACH 12/5/2022 trach  .. 4) PEG12/5/2022 peg   .. 5) Cellulitis hand absc 12/13 mod enterococcus fecalis  staph epi 12/12-12/15/2022  cephalexin 12/15 vanco once  12/16-12/19 zosyn  .. 6) Vent weaning     PROBLEM ASSESSMENT RECOMMENDATIONS.  Trach 12/5   .. trach care   Trach change  ..  size 4 fenestrated cuffed trach  Trach wean.  .. 2/6/2023 pt still has lot of secretions  INFECTION.  .. w 1/17-1/18-1/20-1/21-2/3/2023 w 8.3- 7.9 - 6.8 - 7.2 - 8.8    .. pr 1/17-1/20/2023 (-) - (-)   .. 1/16-1/20/2023 Rocephin started by hospitalist dced   CHF   .. Cr 2/6/2023 Cr .7   .. 11/14/2022 echo mod decr segmental lvsf apical lateral apiccal ant segment are abn takotsubo cmpthy pasp 42 .  .. Monitor for chf has chr hfref per echo   COPD   .. 12/30 symbicort 160   .. 1/7/2023 glycopyrrolate 1.2   .. 1/9/2023 ipratropium  .. 1/15/2023 scopolamine   .. continue bd ics for copd   Anemia.  .. Hb 1/12-1/14-1/19-1/20-7/21-1/25-2/3/2023       Hb 9.8- 10 -9.1- 9.8  - 9.2 - 9.5 -10  .. target hb 7 (+)  .. monitor   sp cac   .. good neuro recovery awake alert interactive   VTE  .. 1/3/2023 apixa 5.2 (vte)      OVERALL   WEANED OFF VENT 1/23   TRACH WEANING  Will start capping when secretions decrease  FLAILING OF LOWER EXTR   .. Jersks started 1/16   .. 1/19/2023 myoclonic jerks improvd on depakote     TIME SPENT   Over 25 minutes aggregate care time spent on encounter; activities included   direct patient care, counseling and/or coordinating care reviewing notes, lab data/ imaging , discussion with multidisciplinary team/ patient  /family and explaining in detail risks, benefits, alternatives  of the recommendations     BRANDON CAMARILLO

## 2023-02-06 NOTE — PROGRESS NOTE ADULT - SUBJECTIVE AND OBJECTIVE BOX
Patient is a 72y old  Male who presents with a chief complaint of s/p cardiac arrest, hypoglycemia (06 Feb 2023 08:59)      OVERNIGHT EVENTS:  Patients seen and examined at bedside this morning. No acute events overnight.    REVIEW OF SYSTEMS: denies chest pain/SOB, diaphoresis, no F/C, cough, dizziness, headache, blurry vision, nausea, vomiting, abdominal pain. All others review of systems negative     MEDICATIONS  (STANDING):  apixaban 5 milliGRAM(s) Oral every 12 hours  aspirin  chewable 81 milliGRAM(s) Oral daily  atorvastatin 20 milliGRAM(s) Oral at bedtime  bacitracin   Ointment 1 Application(s) Topical two times a day  bacitracin   Ointment 1 Application(s) Topical two times a day  budesonide 160 MICROgram(s)/formoterol 4.5 MICROgram(s) Inhaler 2 Puff(s) Inhalation two times a day  chlorhexidine 2% Cloths 1 Application(s) Topical <User Schedule>  collagenase Ointment 1 Application(s) Topical two times a day  cyanocobalamin 1000 MICROGram(s) Oral daily  dextrose 5%. 1000 milliLiter(s) (50 mL/Hr) IV Continuous <Continuous>  dextrose 5%. 1000 milliLiter(s) (100 mL/Hr) IV Continuous <Continuous>  dextrose 50% Injectable 25 Gram(s) IV Push once  dextrose 50% Injectable 12.5 Gram(s) IV Push once  dextrose 50% Injectable 25 Gram(s) IV Push once  diphenhydrAMINE Injectable 50 milliGRAM(s) IV Push once  folic acid 1 milliGRAM(s) Oral daily  glucagon  Injectable 1 milliGRAM(s) IntraMuscular once  glycopyrrolate 1 milliGRAM(s) Oral two times a day  insulin glargine Injectable (LANTUS) 10 Unit(s) SubCutaneous at bedtime  insulin lispro (ADMELOG) corrective regimen sliding scale   SubCutaneous every 6 hours  levETIRAcetam 1500 milliGRAM(s) Oral two times a day  metoprolol tartrate 25 milliGRAM(s) Oral two times a day  polyethylene glycol 3350 17 Gram(s) Oral daily  scopolamine 1 mG/72 Hr(s) Patch 1 Patch Transdermal every 72 hours  senna 2 Tablet(s) Oral at bedtime  silver sulfADIAZINE 1% Cream 1 Application(s) Topical two times a day  valproic  acid Syrup 750 milliGRAM(s) Oral <User Schedule>  vitamin A &amp; D Ointment 1 Application(s) Topical two times a day    MEDICATIONS  (PRN):  acetaminophen     Tablet .. 650 milliGRAM(s) Oral every 6 hours PRN Temp greater or equal to 38C (100.4F), Mild Pain (1 - 3)  albuterol/ipratropium for Nebulization 3 milliLiter(s) Nebulizer every 6 hours PRN Shortness of Breath and/or Wheezing  aluminum hydroxide/magnesium hydroxide/simethicone Suspension 30 milliLiter(s) Oral every 4 hours PRN Dyspepsia  dextrose Oral Gel 15 Gram(s) Oral once PRN Blood Glucose LESS THAN 70 milliGRAM(s)/deciliter      Allergies    No Known Allergies    Intolerances        T(F): 98.6 (02-06-23 @ 11:01), Max: 98.8 (02-05-23 @ 16:55)  HR: 93 (02-06-23 @ 11:01) (64 - 93)  BP: 118/69 (02-06-23 @ 11:01) (112/71 - 125/61)  RR: 16 (02-06-23 @ 11:01) (16 - 18)  SpO2: 93% (02-06-23 @ 11:01) (93% - 100%)  Wt(kg): --    PHYSICAL EXAM:  GENERAL: NAD  HEAD:  Atraumatic, Normocephalic  EYES: PERRLA, conjunctiva and sclera clear  ENMT: Moist mucous membranes  NECK: Supple, No JVD, Normal thyroid  NERVOUS SYSTEM:  Alert & Awake  CHEST/LUNG: Clear to percussion bilaterally;   HEART: Regular rate and rhythm;   ABDOMEN: Soft, Nontender, Nondistended; Bowel sounds present  EXTREMITIES:  no edema BL LE  SKIN: moist    LABS:              Cultures;   CAPILLARY BLOOD GLUCOSE      POCT Blood Glucose.: 101 mg/dL (06 Feb 2023 12:03)  POCT Blood Glucose.: 152 mg/dL (06 Feb 2023 05:22)  POCT Blood Glucose.: 155 mg/dL (05 Feb 2023 23:04)  POCT Blood Glucose.: 150 mg/dL (05 Feb 2023 17:37)    Lipid panel:           RADIOLOGY & ADDITIONAL TESTS:    Imaging Personally Reviewed:  [x ] YES      Consultant(s) Notes Reviewed:  [x ] YES     Care Discussed with [x ] Consultants [X ] Patient [ ] Family  [x ]    [x ]  Other; RN

## 2023-02-07 LAB
GLUCOSE BLDC GLUCOMTR-MCNC: 164 MG/DL — HIGH (ref 70–99)
GLUCOSE BLDC GLUCOMTR-MCNC: 186 MG/DL — HIGH (ref 70–99)
GLUCOSE BLDC GLUCOMTR-MCNC: 192 MG/DL — HIGH (ref 70–99)
GLUCOSE BLDC GLUCOMTR-MCNC: 204 MG/DL — HIGH (ref 70–99)

## 2023-02-07 PROCEDURE — 99232 SBSQ HOSP IP/OBS MODERATE 35: CPT

## 2023-02-07 PROCEDURE — 99231 SBSQ HOSP IP/OBS SF/LOW 25: CPT

## 2023-02-07 RX ADMIN — Medication 1 APPLICATION(S): at 05:26

## 2023-02-07 RX ADMIN — Medication 1 APPLICATION(S): at 17:02

## 2023-02-07 RX ADMIN — Medication 1 APPLICATION(S): at 06:02

## 2023-02-07 RX ADMIN — Medication 1 MILLIGRAM(S): at 11:54

## 2023-02-07 RX ADMIN — ROBINUL 1 MILLIGRAM(S): 0.2 INJECTION INTRAMUSCULAR; INTRAVENOUS at 17:05

## 2023-02-07 RX ADMIN — Medication 750 MILLIGRAM(S): at 08:26

## 2023-02-07 RX ADMIN — Medication 81 MILLIGRAM(S): at 11:34

## 2023-02-07 RX ADMIN — POLYETHYLENE GLYCOL 3350 17 GRAM(S): 17 POWDER, FOR SOLUTION ORAL at 11:36

## 2023-02-07 RX ADMIN — Medication 25 MILLIGRAM(S): at 17:04

## 2023-02-07 RX ADMIN — CHLORHEXIDINE GLUCONATE 1 APPLICATION(S): 213 SOLUTION TOPICAL at 05:26

## 2023-02-07 RX ADMIN — APIXABAN 5 MILLIGRAM(S): 2.5 TABLET, FILM COATED ORAL at 17:04

## 2023-02-07 RX ADMIN — LEVETIRACETAM 1500 MILLIGRAM(S): 250 TABLET, FILM COATED ORAL at 05:20

## 2023-02-07 RX ADMIN — BUDESONIDE AND FORMOTEROL FUMARATE DIHYDRATE 2 PUFF(S): 160; 4.5 AEROSOL RESPIRATORY (INHALATION) at 17:07

## 2023-02-07 RX ADMIN — Medication 25 MILLIGRAM(S): at 05:21

## 2023-02-07 RX ADMIN — BUDESONIDE AND FORMOTEROL FUMARATE DIHYDRATE 2 PUFF(S): 160; 4.5 AEROSOL RESPIRATORY (INHALATION) at 05:19

## 2023-02-07 RX ADMIN — SCOPALAMINE 1 PATCH: 1 PATCH, EXTENDED RELEASE TRANSDERMAL at 07:00

## 2023-02-07 RX ADMIN — Medication 1: at 23:05

## 2023-02-07 RX ADMIN — Medication 2: at 05:31

## 2023-02-07 RX ADMIN — Medication 1 APPLICATION(S): at 05:27

## 2023-02-07 RX ADMIN — APIXABAN 5 MILLIGRAM(S): 2.5 TABLET, FILM COATED ORAL at 05:21

## 2023-02-07 RX ADMIN — Medication 1 APPLICATION(S): at 16:56

## 2023-02-07 RX ADMIN — Medication 1 APPLICATION(S): at 17:06

## 2023-02-07 RX ADMIN — Medication 750 MILLIGRAM(S): at 20:26

## 2023-02-07 RX ADMIN — Medication 1: at 17:02

## 2023-02-07 RX ADMIN — PREGABALIN 1000 MICROGRAM(S): 225 CAPSULE ORAL at 11:35

## 2023-02-07 RX ADMIN — LEVETIRACETAM 1500 MILLIGRAM(S): 250 TABLET, FILM COATED ORAL at 17:04

## 2023-02-07 RX ADMIN — INSULIN GLARGINE 10 UNIT(S): 100 INJECTION, SOLUTION SUBCUTANEOUS at 23:05

## 2023-02-07 RX ADMIN — ATORVASTATIN CALCIUM 20 MILLIGRAM(S): 80 TABLET, FILM COATED ORAL at 21:02

## 2023-02-07 RX ADMIN — Medication 1: at 11:37

## 2023-02-07 RX ADMIN — ROBINUL 1 MILLIGRAM(S): 0.2 INJECTION INTRAMUSCULAR; INTRAVENOUS at 05:21

## 2023-02-07 RX ADMIN — SENNA PLUS 2 TABLET(S): 8.6 TABLET ORAL at 21:02

## 2023-02-07 RX ADMIN — SCOPALAMINE 1 PATCH: 1 PATCH, EXTENDED RELEASE TRANSDERMAL at 20:13

## 2023-02-07 NOTE — PROGRESS NOTE ADULT - SUBJECTIVE AND OBJECTIVE BOX
BRANDON MÉNDEZL    [facility] [address]    Allergies    No Known Allergies    Intolerances        PAST MEDICAL & SURGICAL HISTORY:  HTN (hypertension)      HLD (hyperlipidemia)      Diabetes mellitus      Lacunar infarction      BPH (benign prostatic hyperplasia)      CHF (congestive heart failure)      Chronic obstructive pulmonary disease, unspecified COPD type      S/P CABG (coronary artery bypass graft)  2014          FAMILY HISTORY:      Home Medications:  aspirin 81 mg oral delayed release tablet: 1 tab(s) orally once a day (12 Jun 2020 17:35)  Liquifilm Tears preserved ophthalmic solution: 1 drop(s) to each affected eye 2 times a day (12 Jun 2020 17:35)      MEDICATIONS  (STANDING):  apixaban 5 milliGRAM(s) Oral every 12 hours  aspirin  chewable 81 milliGRAM(s) Oral daily  atorvastatin 20 milliGRAM(s) Oral at bedtime  bacitracin   Ointment 1 Application(s) Topical two times a day  bacitracin   Ointment 1 Application(s) Topical two times a day  budesonide 160 MICROgram(s)/formoterol 4.5 MICROgram(s) Inhaler 2 Puff(s) Inhalation two times a day  chlorhexidine 2% Cloths 1 Application(s) Topical <User Schedule>  collagenase Ointment 1 Application(s) Topical two times a day  cyanocobalamin 1000 MICROGram(s) Oral daily  dextrose 5%. 1000 milliLiter(s) (50 mL/Hr) IV Continuous <Continuous>  dextrose 5%. 1000 milliLiter(s) (100 mL/Hr) IV Continuous <Continuous>  dextrose 50% Injectable 25 Gram(s) IV Push once  dextrose 50% Injectable 12.5 Gram(s) IV Push once  dextrose 50% Injectable 25 Gram(s) IV Push once  diphenhydrAMINE Injectable 50 milliGRAM(s) IV Push once  folic acid 1 milliGRAM(s) Oral daily  glucagon  Injectable 1 milliGRAM(s) IntraMuscular once  glycopyrrolate 1 milliGRAM(s) Oral two times a day  insulin glargine Injectable (LANTUS) 10 Unit(s) SubCutaneous at bedtime  insulin lispro (ADMELOG) corrective regimen sliding scale   SubCutaneous every 6 hours  levETIRAcetam 1500 milliGRAM(s) Oral two times a day  metoprolol tartrate 25 milliGRAM(s) Oral two times a day  polyethylene glycol 3350 17 Gram(s) Oral daily  scopolamine 1 mG/72 Hr(s) Patch 1 Patch Transdermal every 72 hours  senna 2 Tablet(s) Oral at bedtime  silver sulfADIAZINE 1% Cream 1 Application(s) Topical two times a day  valproic  acid Syrup 750 milliGRAM(s) Oral <User Schedule>  vitamin A &amp; D Ointment 1 Application(s) Topical two times a day    MEDICATIONS  (PRN):  acetaminophen     Tablet .. 650 milliGRAM(s) Oral every 6 hours PRN Temp greater or equal to 38C (100.4F), Mild Pain (1 - 3)  albuterol/ipratropium for Nebulization 3 milliLiter(s) Nebulizer every 6 hours PRN Shortness of Breath and/or Wheezing  aluminum hydroxide/magnesium hydroxide/simethicone Suspension 30 milliLiter(s) Oral every 4 hours PRN Dyspepsia  dextrose Oral Gel 15 Gram(s) Oral once PRN Blood Glucose LESS THAN 70 milliGRAM(s)/deciliter      Diet, NPO with Tube Feed:   Tube Feeding Modality: Gastrostomy  Glucerna 1.2 Pedrito  Total Volume for 24 Hours (mL): 1440  Continuous  Until Goal Tube Feed Rate (mL per Hour): 60  Tube Feed Duration (in Hours): 24  Tube Feed Start Time: 14:30  Free Water Flush  Free Water Flush Instructions:  30 ml/hr via pump  Liquid Protein Supplement     Qty per Day:  1 (01-09-23 @ 13:45) [Active]          Vital Signs Last 24 Hrs  T(C): 37.2 (07 Feb 2023 10:36), Max: 37.3 (06 Feb 2023 23:08)  T(F): 98.9 (07 Feb 2023 10:36), Max: 99.1 (06 Feb 2023 23:08)  HR: 68 (07 Feb 2023 10:36) (68 - 86)  BP: 111/70 (07 Feb 2023 10:36) (109/65 - 131/75)  BP(mean): --  RR: 18 (07 Feb 2023 10:36) (17 - 30)  SpO2: 98% (07 Feb 2023 10:36) (97% - 100%)    Parameters below as of 07 Feb 2023 10:36  Patient On (Oxygen Delivery Method): tracheostomy collar          02-06-23 @ 07:01 - 02-07-23 @ 07:00  --------------------------------------------------------  IN: 0 mL / OUT: 450 mL / NET: -450 mL              LABS:                    WBC:      MICROBIOLOGY:  RECENT CULTURES:                  Sodium:          Hemoglobin:      Platelets:             RADIOLOGY & ADDITIONAL STUDIES:      MICROBIOLOGY:  RECENT CULTURES:

## 2023-02-07 NOTE — PROGRESS NOTE ADULT - ASSESSMENT
REVIEW OF SYMPTOMS      Able to give (reliable) ROS  NO     PHYSICAL EXAM    HEENT Unremarkable  atraumatic   RESP Fair air entry EXP prolonged    Harsh breath sound Resp distres mild   CARDIAC S1 S2 No S3     NO JVD    ABDOMEN SOFT BS PRESENT NOT DISTENDED No hepatosplenomegaly   PEDAL EDEMA present No calf tenderness  NO rash       GENERAL DATA .   GOC.  12/11/2022 full code       ALLGY.  nka                            WT. ..  12/2/2022 86  BMI. ..    12/2/2022 29                          ICU STAY.  .. 11/29-12/9  COVID.   .. 12/2/2022 scv2 (-)   .. 11/20/2022 scv2 (-)   BEST PRACTICE ISSUES.    HOB ELEVATN. Yes  DVT PPLX.    .. 1/3/2023 apixa 5.2 (vte)    (DVT 12/12 l Subclav throm)  ALEJO PPLX.   INFN PPLX.   .. 11/14 chlorhex 2%   SP SW ANTWAN.         DIET.    ..  12/15 glucerna 1.2 1440 gt   IV fl.    PROCEDURES.  .. 1/28  size 4 fenestrated cuffed trach placed by surgery   .. 12/19 debridement of sacral wound     ABGS.  1/28/2023 afeb 70 100/60   1/28/2023 tc 97%  1/6/2023 ac 16/450/.3/5 746/49/123     VS/ PO/IO/ VENT/ DRIPS.  2/7/2023 afeb 68 110/70  2/7/2023 tc 28% 5l po 98%    PATIENT PRESENTATION.  72 m doa 11/14/2022 cac    PMH  PMH CAD s/p PCI with stent 2015 and CABG 2014,   PMH  s/p medtronic PPM,   PMH CVA (lacunar infarct)    HOSPITAL COURSE   .. 1) PEA arrest with ROSC after approximately 5 min 11/14  Now communicative   .. 2) DVT 12/12 l Subclav thromS 12/15/2022 lvnx 80.2 1/3 changed to apixaban 5.2  .. 3) TRACH 12/5/2022 trach  .. 4) PEG12/5/2022 peg   .. 5) Cellulitis hand absc 12/13 mod enterococcus fecalis  staph epi 12/12-12/15/2022  cephalexin 12/15 vanco once  12/16-12/19 zosyn  .. 6) Vent weaning       PROBLEM ASSESSMENT RECOMMENDATIONS.  Trach 12/5   .. trach care   Trach change  ..  1/28 size 4 fenestrated cuffed trach  Trach wean.  .. 2/6/2023 pt still has lot of secretions  VTE  .. On apixaba  INFECTION.  .. w 1/17-1/18-1/20-1/21-2/3/2023      w 8.3- 7.9 - 6.8 - 7.2 - 8.8    .. pr 1/17-1/20/2023 (-)  (-)   .. 1/16-1/20/2023 Rocephin started by hospitalist dced   CAD.  .. 12/13 metoprolol 25.2   CHF   .. Cr 2/6/2023 Cr .7   .. 11/14/2022 echo mod decr segmental lvsf apical lateral apiccal ant segment are abn takotsubo cmpthy pasp 42 .  .. Monitor for chf has chr hfref per echo   COPD   .. 2/1 duoneb p   .. 12/30 symbicort 160   .. 1/7/2023 glycopyrrolate 1.2   .. 1/9/2023 ipratropium  .. 1/15/2023 scopolamine   .. continue bd ics for copd   Anemia.  .. Hb 1/12-1/14-1/19-1/20-7/21-1/25-2/3/2023       Hb 9.8- 10 -9.1- 9.8  - 9.2 - 9.5 -10  .. target hb 7 (+)  .. monitor   sp cac   .. good neuro recovery awake alert interactive   VTE  .. 1/3/2023 apixa 5.2 (vte)      OVERALL   WEANED OFF VENT 1/23   TRACH WEANING  Will start capping when secretions decrease  FLAILING OF LOWER EXTR   .. Jersks started 1/16   .. 1/19/2023 myoclonic jerks improvd on depakote         TIME SPENT   Over 25 minutes aggregate care time spent on encounter; activities included   direct patient care, counseling and/or coordinating care reviewing notes, lab data/ imaging , discussion with multidisciplinary team/ patient  /family and explaining in detail risks, benefits, alternatives  of the recommendations     BRANDON CAMARILLO

## 2023-02-07 NOTE — PROGRESS NOTE ADULT - SUBJECTIVE AND OBJECTIVE BOX
Patient is a 72y old  Male who presents with a chief complaint of s/p cardiac arrest, hypoglycemia (07 Feb 2023 11:09)      OVERNIGHT EVENTS:  Patients seen and examined at bedside this morning. No acute events overnight.    REVIEW OF SYSTEMS: denies chest pain/SOB, diaphoresis, no F/C, cough, dizziness, headache, blurry vision, nausea, vomiting, abdominal pain. All others review of systems negative     MEDICATIONS  (STANDING):  apixaban 5 milliGRAM(s) Oral every 12 hours  aspirin  chewable 81 milliGRAM(s) Oral daily  atorvastatin 20 milliGRAM(s) Oral at bedtime  bacitracin   Ointment 1 Application(s) Topical two times a day  bacitracin   Ointment 1 Application(s) Topical two times a day  budesonide 160 MICROgram(s)/formoterol 4.5 MICROgram(s) Inhaler 2 Puff(s) Inhalation two times a day  chlorhexidine 2% Cloths 1 Application(s) Topical <User Schedule>  collagenase Ointment 1 Application(s) Topical two times a day  cyanocobalamin 1000 MICROGram(s) Oral daily  dextrose 5%. 1000 milliLiter(s) (50 mL/Hr) IV Continuous <Continuous>  dextrose 5%. 1000 milliLiter(s) (100 mL/Hr) IV Continuous <Continuous>  dextrose 50% Injectable 25 Gram(s) IV Push once  dextrose 50% Injectable 12.5 Gram(s) IV Push once  dextrose 50% Injectable 25 Gram(s) IV Push once  diphenhydrAMINE Injectable 50 milliGRAM(s) IV Push once  folic acid 1 milliGRAM(s) Oral daily  glucagon  Injectable 1 milliGRAM(s) IntraMuscular once  glycopyrrolate 1 milliGRAM(s) Oral two times a day  insulin glargine Injectable (LANTUS) 10 Unit(s) SubCutaneous at bedtime  insulin lispro (ADMELOG) corrective regimen sliding scale   SubCutaneous every 6 hours  levETIRAcetam 1500 milliGRAM(s) Oral two times a day  metoprolol tartrate 25 milliGRAM(s) Oral two times a day  polyethylene glycol 3350 17 Gram(s) Oral daily  scopolamine 1 mG/72 Hr(s) Patch 1 Patch Transdermal every 72 hours  senna 2 Tablet(s) Oral at bedtime  silver sulfADIAZINE 1% Cream 1 Application(s) Topical two times a day  valproic  acid Syrup 750 milliGRAM(s) Oral <User Schedule>  vitamin A &amp; D Ointment 1 Application(s) Topical two times a day    MEDICATIONS  (PRN):  acetaminophen     Tablet .. 650 milliGRAM(s) Oral every 6 hours PRN Temp greater or equal to 38C (100.4F), Mild Pain (1 - 3)  albuterol/ipratropium for Nebulization 3 milliLiter(s) Nebulizer every 6 hours PRN Shortness of Breath and/or Wheezing  aluminum hydroxide/magnesium hydroxide/simethicone Suspension 30 milliLiter(s) Oral every 4 hours PRN Dyspepsia  dextrose Oral Gel 15 Gram(s) Oral once PRN Blood Glucose LESS THAN 70 milliGRAM(s)/deciliter      Allergies    No Known Allergies    Intolerances        T(F): 98.9 (02-07-23 @ 10:36), Max: 99.1 (02-06-23 @ 23:08)  HR: 68 (02-07-23 @ 10:36) (68 - 86)  BP: 111/70 (02-07-23 @ 10:36) (109/65 - 131/75)  RR: 18 (02-07-23 @ 10:36) (17 - 30)  SpO2: 98% (02-07-23 @ 10:36) (97% - 100%)  Wt(kg): --    PHYSICAL EXAM:  GENERAL: NAD  HEAD:  Atraumatic, Normocephalic  EYES: PERRLA, conjunctiva and sclera clear  ENMT: Moist mucous membranes  NECK: Supple, No JVD, Normal thyroid  NERVOUS SYSTEM:  Alert & Awake  CHEST/LUNG: Clear to percussion bilaterally;   HEART: Regular rate and rhythm;   ABDOMEN: Soft, Nontender, Nondistended; Bowel sounds present  EXTREMITIES:  no edema BL LE  SKIN: moist    LABS:              Cultures;   CAPILLARY BLOOD GLUCOSE      POCT Blood Glucose.: 186 mg/dL (07 Feb 2023 11:15)  POCT Blood Glucose.: 204 mg/dL (07 Feb 2023 05:30)  POCT Blood Glucose.: 215 mg/dL (06 Feb 2023 22:56)  POCT Blood Glucose.: 147 mg/dL (06 Feb 2023 16:49)    Lipid panel:           RADIOLOGY & ADDITIONAL TESTS:    Imaging Personally Reviewed:  [x ] YES      Consultant(s) Notes Reviewed:  [x ] YES     Care Discussed with [x ] Consultants [X ] Patient [ ] Family  [x ]    [x ]  Other; RN

## 2023-02-08 LAB
ANION GAP SERPL CALC-SCNC: 6 MMOL/L — SIGNIFICANT CHANGE UP (ref 5–17)
BUN SERPL-MCNC: 28 MG/DL — HIGH (ref 7–23)
CALCIUM SERPL-MCNC: 8.4 MG/DL — LOW (ref 8.5–10.1)
CHLORIDE SERPL-SCNC: 100 MMOL/L — SIGNIFICANT CHANGE UP (ref 96–108)
CO2 SERPL-SCNC: 32 MMOL/L — HIGH (ref 22–31)
CREAT SERPL-MCNC: 0.78 MG/DL — SIGNIFICANT CHANGE UP (ref 0.5–1.3)
EGFR: 94 ML/MIN/1.73M2 — SIGNIFICANT CHANGE UP
GLUCOSE BLDC GLUCOMTR-MCNC: 162 MG/DL — HIGH (ref 70–99)
GLUCOSE BLDC GLUCOMTR-MCNC: 166 MG/DL — HIGH (ref 70–99)
GLUCOSE BLDC GLUCOMTR-MCNC: 175 MG/DL — HIGH (ref 70–99)
GLUCOSE BLDC GLUCOMTR-MCNC: 177 MG/DL — HIGH (ref 70–99)
GLUCOSE BLDC GLUCOMTR-MCNC: 187 MG/DL — HIGH (ref 70–99)
GLUCOSE SERPL-MCNC: 158 MG/DL — HIGH (ref 70–99)
HCT VFR BLD CALC: 33.5 % — LOW (ref 39–50)
HGB BLD-MCNC: 10.5 G/DL — LOW (ref 13–17)
MCHC RBC-ENTMCNC: 29.8 PG — SIGNIFICANT CHANGE UP (ref 27–34)
MCHC RBC-ENTMCNC: 31.3 G/DL — LOW (ref 32–36)
MCV RBC AUTO: 95.2 FL — SIGNIFICANT CHANGE UP (ref 80–100)
NRBC # BLD: 0 /100 WBCS — SIGNIFICANT CHANGE UP (ref 0–0)
PLATELET # BLD AUTO: 247 K/UL — SIGNIFICANT CHANGE UP (ref 150–400)
POTASSIUM SERPL-MCNC: 4.1 MMOL/L — SIGNIFICANT CHANGE UP (ref 3.5–5.3)
POTASSIUM SERPL-SCNC: 4.1 MMOL/L — SIGNIFICANT CHANGE UP (ref 3.5–5.3)
RBC # BLD: 3.52 M/UL — LOW (ref 4.2–5.8)
RBC # FLD: 14 % — SIGNIFICANT CHANGE UP (ref 10.3–14.5)
SODIUM SERPL-SCNC: 138 MMOL/L — SIGNIFICANT CHANGE UP (ref 135–145)
WBC # BLD: 10 K/UL — SIGNIFICANT CHANGE UP (ref 3.8–10.5)
WBC # FLD AUTO: 10 K/UL — SIGNIFICANT CHANGE UP (ref 3.8–10.5)

## 2023-02-08 PROCEDURE — 99232 SBSQ HOSP IP/OBS MODERATE 35: CPT

## 2023-02-08 RX ORDER — ROBINUL 0.2 MG/ML
1 INJECTION INTRAMUSCULAR; INTRAVENOUS
Refills: 0 | Status: DISCONTINUED | OUTPATIENT
Start: 2023-02-08 | End: 2023-03-11

## 2023-02-08 RX ORDER — SENNA PLUS 8.6 MG/1
2 TABLET ORAL AT BEDTIME
Refills: 0 | Status: DISCONTINUED | OUTPATIENT
Start: 2023-02-08 | End: 2023-03-18

## 2023-02-08 RX ORDER — ACETAMINOPHEN 500 MG
650 TABLET ORAL EVERY 6 HOURS
Refills: 0 | Status: DISCONTINUED | OUTPATIENT
Start: 2023-02-08 | End: 2023-03-18

## 2023-02-08 RX ORDER — POLYETHYLENE GLYCOL 3350 17 G/17G
17 POWDER, FOR SOLUTION ORAL DAILY
Refills: 0 | Status: DISCONTINUED | OUTPATIENT
Start: 2023-02-08 | End: 2023-03-18

## 2023-02-08 RX ORDER — METOPROLOL TARTRATE 50 MG
25 TABLET ORAL
Refills: 0 | Status: DISCONTINUED | OUTPATIENT
Start: 2023-02-08 | End: 2023-03-11

## 2023-02-08 RX ORDER — ATORVASTATIN CALCIUM 80 MG/1
20 TABLET, FILM COATED ORAL AT BEDTIME
Refills: 0 | Status: DISCONTINUED | OUTPATIENT
Start: 2023-02-08 | End: 2023-03-18

## 2023-02-08 RX ADMIN — Medication 1 APPLICATION(S): at 17:43

## 2023-02-08 RX ADMIN — Medication 1 APPLICATION(S): at 05:36

## 2023-02-08 RX ADMIN — Medication 1 APPLICATION(S): at 17:46

## 2023-02-08 RX ADMIN — ROBINUL 1 MILLIGRAM(S): 0.2 INJECTION INTRAMUSCULAR; INTRAVENOUS at 05:06

## 2023-02-08 RX ADMIN — INSULIN GLARGINE 10 UNIT(S): 100 INJECTION, SOLUTION SUBCUTANEOUS at 22:21

## 2023-02-08 RX ADMIN — APIXABAN 5 MILLIGRAM(S): 2.5 TABLET, FILM COATED ORAL at 17:42

## 2023-02-08 RX ADMIN — Medication 1 APPLICATION(S): at 05:07

## 2023-02-08 RX ADMIN — SCOPALAMINE 1 PATCH: 1 PATCH, EXTENDED RELEASE TRANSDERMAL at 22:23

## 2023-02-08 RX ADMIN — Medication 1 APPLICATION(S): at 17:45

## 2023-02-08 RX ADMIN — BUDESONIDE AND FORMOTEROL FUMARATE DIHYDRATE 2 PUFF(S): 160; 4.5 AEROSOL RESPIRATORY (INHALATION) at 17:44

## 2023-02-08 RX ADMIN — CHLORHEXIDINE GLUCONATE 1 APPLICATION(S): 213 SOLUTION TOPICAL at 05:02

## 2023-02-08 RX ADMIN — Medication 1: at 17:41

## 2023-02-08 RX ADMIN — Medication 81 MILLIGRAM(S): at 11:53

## 2023-02-08 RX ADMIN — LEVETIRACETAM 1500 MILLIGRAM(S): 250 TABLET, FILM COATED ORAL at 17:41

## 2023-02-08 RX ADMIN — Medication 1 MILLIGRAM(S): at 11:53

## 2023-02-08 RX ADMIN — Medication 1: at 06:17

## 2023-02-08 RX ADMIN — Medication 1: at 11:40

## 2023-02-08 RX ADMIN — APIXABAN 5 MILLIGRAM(S): 2.5 TABLET, FILM COATED ORAL at 05:00

## 2023-02-08 RX ADMIN — PREGABALIN 1000 MICROGRAM(S): 225 CAPSULE ORAL at 11:53

## 2023-02-08 RX ADMIN — Medication 1 APPLICATION(S): at 05:02

## 2023-02-08 RX ADMIN — ATORVASTATIN CALCIUM 20 MILLIGRAM(S): 80 TABLET, FILM COATED ORAL at 22:21

## 2023-02-08 RX ADMIN — ROBINUL 1 MILLIGRAM(S): 0.2 INJECTION INTRAMUSCULAR; INTRAVENOUS at 17:44

## 2023-02-08 RX ADMIN — Medication 750 MILLIGRAM(S): at 22:21

## 2023-02-08 RX ADMIN — SCOPALAMINE 1 PATCH: 1 PATCH, EXTENDED RELEASE TRANSDERMAL at 08:05

## 2023-02-08 RX ADMIN — BUDESONIDE AND FORMOTEROL FUMARATE DIHYDRATE 2 PUFF(S): 160; 4.5 AEROSOL RESPIRATORY (INHALATION) at 05:00

## 2023-02-08 RX ADMIN — LEVETIRACETAM 1500 MILLIGRAM(S): 250 TABLET, FILM COATED ORAL at 05:01

## 2023-02-08 RX ADMIN — Medication 750 MILLIGRAM(S): at 10:23

## 2023-02-08 RX ADMIN — Medication 25 MILLIGRAM(S): at 05:01

## 2023-02-08 RX ADMIN — POLYETHYLENE GLYCOL 3350 17 GRAM(S): 17 POWDER, FOR SOLUTION ORAL at 11:54

## 2023-02-08 NOTE — PROGRESS NOTE ADULT - ASSESSMENT
REVIEW OF SYMPTOMS      Able to give (reliable) ROS  NO     PHYSICAL EXAM    HEENT Unremarkable  atraumatic   RESP Fair air entry EXP prolonged    Harsh breath sound Resp distres mild   CARDIAC S1 S2 No S3     NO JVD    ABDOMEN SOFT BS PRESENT NOT DISTENDED No hepatosplenomegaly   PEDAL EDEMA present No calf tenderness  NO rash       GENERAL DATA .   GOC.  12/11/2022 full code       ALLGY.  nka                            WT. ..  12/2/2022 86  BMI. ..    12/2/2022 29                          ICU STAY.  .. 11/29-12/9  COVID.   .. 12/2/2022 scv2 (-)   .. 11/20/2022 scv2 (-)   BEST PRACTICE ISSUES.    HOB ELEVATN. Yes  DVT PPLX.    .. 1/3/2023 apixa 5.2 (vte)    (DVT 12/12 l Subclav throm)  ALEJO PPLX.   INFN PPLX.   .. 11/14 chlorhex 2%   SP SW ANTWAN.         DIET.    ..  12/15 glucerna 1.2 1440 gt   IV fl.    PROCEDURES.  .. 1/28  size 4 fenestrated cuffed trach placed by surgery   .. 12/19 debridement of sacral wound   .. 12/12 r arm midline   .. 12/5/2022 trach  .. 12/5/2022 peg       ABGS.  1/28/2023 afeb 70 100/60   1/28/2023 tc 97%  1/6/2023 ac 16/450/.3/5 746/49/123     VS/ PO/IO/ VENT/ DRIPS.  2/8/2023 afeb 70 110/70   2/8/2023 tc 96%     PATIENT PRESENTATION.  72 m doa 11/14/2022 cac    PMH  PMH CAD s/p PCI with stent 2015 and CABG 2014,   PMH  s/p medtronic PPM,   PMH CVA (lacunar infarct)    HOSPITAL COURSE   .. 1) PEA arrest with ROSC after approximately 5 min 11/14  Now communicative   .. 2) DVT 12/12 l Subclav thromS 12/15/2022 lvnx 80.2 1/3 changed to apixaban 5.2  .. 3) TRACH 12/5/2022 trach  .. 4) PEG12/5/2022 peg   .. 5) Cellulitis hand absc 12/13 mod enterococcus fecalis  staph epi 12/12-12/15/2022  cephalexin 12/15 vanco once  12/16-12/19 zosyn  .. 6) Vent weaning       PROBLEM ASSESSMENT RECOMMENDATIONS.  Trach 12/5   .. trach care   Trach change  ..  1/28 size 4 fenestrated cuffed trach  Trach wean.  .. 2/6/2023 pt still has lot of secretions  VTE  .. On apixaba  INFECTION.  .. w 1/17-1/18-1/20-1/21-2/3-2/8/2023      w 8.3- 7.9 - 6.8 - 7.2 - 8.8  - 10  .. pr 1/17-1/20/2023 (-)  (-)   .. 1/16-1/20/2023 Rocephin started by hospitalist dced   CAD.  .. 12/13 metoprolol 25.2   CHF   .. Cr 2/6/2023 Cr .7   .. 11/14/2022 echo mod decr segmental lvsf apical lateral apiccal ant segment are abn takotsubo cmpthy pasp 42 .  .. Monitor for chf has chr hfref per echo   COPD   .. 2/1 duoneb p   .. 12/30 symbicort 160   .. 1/7/2023 glycopyrrolate 1.2   .. 1/9/2023 ipratropium  .. 1/15/2023 scopolamine   .. continue bd ics for copd   Anemia.  .. Hb 1/12-1/14-1/19-1/20-7/21-1/25-2/3-2/8/2023       Hb 9.8- 10 -9.1- 9.8  - 9.2 - 9.5 -10- 10   .. target hb 7 (+)  .. monitor   sp cac   .. good neuro recovery awake alert interactive   VTE  .. 1/3/2023 apixa 5.2 (vte)      OVERALL   WEANED OFF VENT 1/23   TRACH WEANING  Will start capping when secretions decrease  FLAILING OF LOWER EXTR   .. Jersks started 1/16   .. 1/19/2023 myoclonic jerks improvd on depakote       TIME SPENT   Over 25 minutes aggregate care time spent on encounter; activities included   direct patient care, counseling and/or coordinating care reviewing notes, lab data/ imaging , discussion with multidisciplinary team/ patient  /family and explaining in detail risks, benefits, alternatives  of the recommendations     BRANDON CAMARILLO

## 2023-02-08 NOTE — CHART NOTE - NSCHARTNOTEFT_GEN_A_CORE
Assessment:  Pt seen in 2C unit, c trach collar, G-Tube infusing c Glucerna 1.2 @ 60 m/hr.  Pt's son was @ bedside.  Pt adm c dx of hypoglycemia, cardiac arrest, hypothermia, s/p critical care stay, hospital course complicated by prolonged hypoxemic respiratory failure, acute/chronic respiratory failure, COPD, s/p trach, Surgery on board, s/p Trach change to #4 fenestrated tracheostomy placed 1/30, f/u Capping trial noted, s/p PEG, DVT, multiple infections(enterococcal faecalis cellulitis; resolved, infected hand hematoma; s/p debridement 12/13/22, tracheobronchitis), tonic clonic seizure, fever, anemia of chronic disease, functional quadriplegia, plan for RITA noted, stage IV decubiti; sacrum; s/p debridement 12/19/22, dysphagia, constipation(on GI meds).    PMH include COPD, CAD, CABG, HF, 2nd degree heart block, PPM, HTN, DM( was on Jardiance, metformin , Victoza, Lispro, Lantus PTA), CKD, CVA,     Factors impacting intake: [ ] none [ ] nausea  [ ] vomiting [ ] diarrhea [ ] constipation  [ ]chewing problems [ x] swallowing issues  [x ] other: on G-Tube feeding     Diet Prescription: Diet, NPO with Tube Feed:   Tube Feeding Modality: Gastrostomy  Glucerna 1.2 Pedrito  Total Volume for 24 Hours (mL): 1440  Continuous  Until Goal Tube Feed Rate (mL02/0 per Hour): 60  Tube Feed Duration (in Hours): 24  Tube Feed Start Time: 14:30  Free Water Flush  Free Water Flush Instructions:  30 ml/hr via pump  Liquid Protein Supplement     Qty per Day:  1 (01-09-23 @ 13:45)    Intake: Glucerna 1.2 @ goal rate of 60 mL/hr x 24 hrs =1440 ml, 1728 pedrito, 86 gm pro, 1166 mL free water, 120% RDIs + Liquid protein supplement 1 pkg=15 grams protein, 100 calories + additional 720 ml water from water flushes for total of 1880 ml daily    Current Weight: 02/08, 89 kg, 01/14/22, 82.2 kg(adm), c wt. gain of 6.8 kg  % Weight Change: 8.27%  01/07/23, 1+ generalized edea    Pertinent Medications: MEDICATIONS  (STANDING):  apixaban 5 milliGRAM(s) Oral every 12 hours  aspirin  chewable 81 milliGRAM(s) Oral daily  atorvastatin 20 milliGRAM(s) Oral at bedtime  bacitracin   Ointment 1 Application(s) Topical two times a day  bacitracin   Ointment 1 Application(s) Topical two times a day  budesonide 160 MICROgram(s)/formoterol 4.5 MICROgram(s) Inhaler 2 Puff(s) Inhalation two times a day  chlorhexidine 2% Cloths 1 Application(s) Topical <User Schedule>  collagenase Ointment 1 Application(s) Topical two times a day  cyanocobalamin 1000 MICROGram(s) Oral daily  dextrose 5%. 1000 milliLiter(s) (100 mL/Hr) IV Continuous <Continuous>  dextrose 5%. 1000 milliLiter(s) (50 mL/Hr) IV Continuous <Continuous>  dextrose 50% Injectable 25 Gram(s) IV Push once  dextrose 50% Injectable 12.5 Gram(s) IV Push once  dextrose 50% Injectable 25 Gram(s) IV Push once  diphenhydrAMINE Injectable 50 milliGRAM(s) IV Push once  folic acid 1 milliGRAM(s) Oral daily  glucagon  Injectable 1 milliGRAM(s) IntraMuscular once  glycopyrrolate 1 milliGRAM(s) Oral two times a day  insulin glargine Injectable (LANTUS) 10 Unit(s) SubCutaneous at bedtime  insulin lispro (ADMELOG) corrective regimen sliding scale   SubCutaneous every 6 hours  levETIRAcetam 1500 milliGRAM(s) Oral two times a day  metoprolol tartrate 25 milliGRAM(s) Enteral Tube two times a day  polyethylene glycol 3350 17 Gram(s) Oral daily  scopolamine 1 mG/72 Hr(s) Patch 1 Patch Transdermal every 72 hours  senna 2 Tablet(s) Oral at bedtime  silver sulfADIAZINE 1% Cream 1 Application(s) Topical two times a day  valproic  acid Syrup 750 milliGRAM(s) Oral <User Schedule>  vitamin A &amp; D Ointment 1 Application(s) Topical two times a day    MEDICATIONS  (PRN):  acetaminophen     Tablet .. 650 milliGRAM(s) Oral every 6 hours PRN Temp greater or equal to 38C (100.4F), Mild Pain (1 - 3)  albuterol/ipratropium for Nebulization 3 milliLiter(s) Nebulizer every 6 hours PRN Shortness of Breath and/or Wheezing  aluminum hydroxide/magnesium hydroxide/simethicone Suspension 30 milliLiter(s) Oral every 4 hours PRN Dyspepsia  dextrose Oral Gel 15 Gram(s) Oral once PRN Blood Glucose LESS THAN 70 milliGRAM(s)/deciliter    Pertinent Labs: 02-08 Na138 mmol/L Glu 158 mg/dL<H> K+ 4.1 mmol/L Cr  0.78 mg/dL BUN 28 mg/dL<H> 02-03 Alb 2.0 g/dL<L>02-03 ALT 11 U/L<L> AST 9 U/L<L> Alkaline Phosphatase 82 U/L  01-22-23 @ 06:42 a1c 5.9   CAPILLARY BLOOD GLUCOSE      POCT Blood Glucose.: 166 mg/dL (08 Feb 2023 10:55)  POCT Blood Glucose.: 162 mg/dL (08 Feb 2023 06:15)  POCT Blood Glucose.: 164 mg/dL (07 Feb 2023 22:57)  POCT Blood Glucose.: 192 mg/dL (07 Feb 2023 16:41)    Skin:     Estimated Needs:   [ ] no change since previous assessment  [ ] recalculated:     Previous Nutrition Diagnosis:   [ ] Inadequate Energy Intake [ ]Inadequate Oral Intake [ ] Excessive Energy Intake   [ ] Underweight [ ] Increased Nutrient Needs [ ] Overweight/Obesity   [ ] Altered GI Function [ ] Unintended Weight Loss [ ] Food & Nutrition Related Knowledge Deficit [ ] Malnutrition       Nutrition Diagnosis is [ ] ongoing  [ ] resolved [ ] not applicable       New Nutrition Diagnosis: [ ] not applicable     Interventions:   Recommend  [ ] Change Diet To:  [ ] Nutrition Supplement  [ ] Nutrition Support  [ ] Other:     Monitoring and Evaluation:   [ ] PO intake [ x ] Tolerance to diet prescription [ x ] weights [ x ] labs[ x ] follow up per protocol  [ ] other: Assessment:  Pt seen in 2C unit, c trach collar, G-Tube infusing c Glucerna 1.2 @ 60 m/hr.  Pt's son was @ bedside.  Pt adm c dx of hypoglycemia, cardiac arrest, hypothermia, s/p critical care stay, hospital course complicated by prolonged hypoxemic respiratory failure, acute/chronic respiratory failure, s/p ELMER, shock liver, COPD, s/p trach, Surgery on board, s/p trach change to #4 fenestrated tracheostomy placed 1/30, f/u capping trial noted, s/p PEG, DVT, multiple infections(enterococcal faecalis cellulitis; resolved, infected hand hematoma; s/p debridement 12/13/22, tracheobronchitis), tonic clonic seizure, fever, anemia of chronic disease, functional quadriplegia, plan for RITA noted, stage IV decubiti; sacrum; s/p debridement 12/19/22, dysphagia, constipation(on GI meds).    PMH include COPD, CAD, CABG, HF, 2nd degree heart block, PPM, HTN, DM( was on Jardiance, metformin , Victoza, Lispro, Lantus PTA), CKD, CVA, BPH.    Factors impacting intake: [ ] none [ ] nausea  [ ] vomiting [ ] diarrhea [ x] constipation; 02/08, BM X 3 noted.  [ ]chewing problems [ x] swallowing issues  [x ] other: on G-Tube feeding     Diet Prescription: Diet, NPO with Tube Feed:   Tube Feeding Modality: Gastrostomy  Glucerna 1.2 Pedrito  Total Volume for 24 Hours (mL): 1440  Continuous  Until Goal Tube Feed Rate (mL02/0 per Hour): 60  Tube Feed Duration (in Hours): 24  Tube Feed Start Time: 14:30  Free Water Flush  Free Water Flush Instructions:  30 ml/hr via pump  Liquid Protein Supplement     Qty per Day:  1 (01-09-23 @ 13:45)    Intake: Glucerna 1.2 @ goal rate of 60 mL/hr x 24 hrs =1440 ml, 1728 pedrito, 86 gm pro, 1166 mL free water, 120% RDIs + Liquid protein supplement 1 pkg=15 grams protein, 100 calories + additional 720 ml water from water flushes for total of 1880 ml daily    Current Weight: 02/08, 89 kg, 01/14/22, 82.2 kg(adm), c wt. gain of 6.8 kg  % Weight Change: 8.27%  01/07/23, 1+ generalized edema noted   I/O: 1080/320(+760)    Nutrition focused physical exam conducted; Subcutaneous fat Exam;  [ Mild  ]  Orbital fat pads region,  [ WNL  ]Buccal fat region,  [ Moderate  ]triceps region, [  -]ribs region.  Muscle Exam; [ Mild  ]temples region, [  Mild ]clavicle region, [ Mild  ]shoulder region, [ - ]Scapula region, [ Mild  ]Interosseous region, [ Moderate  ]thigh region, [ Moderate   ]Calf region    Pertinent Medications: MEDICATIONS  (STANDING):  apixaban 5 milliGRAM(s) Oral every 12 hours  aspirin  chewable 81 milliGRAM(s) Oral daily  atorvastatin 20 milliGRAM(s) Oral at bedtime  bacitracin   Ointment 1 Application(s) Topical two times a day  bacitracin   Ointment 1 Application(s) Topical two times a day  budesonide 160 MICROgram(s)/formoterol 4.5 MICROgram(s) Inhaler 2 Puff(s) Inhalation two times a day  chlorhexidine 2% Cloths 1 Application(s) Topical <User Schedule>  collagenase Ointment 1 Application(s) Topical two times a day  cyanocobalamin 1000 MICROGram(s) Oral daily  dextrose 5%. 1000 milliLiter(s) (100 mL/Hr) IV Continuous <Continuous>  dextrose 5%. 1000 milliLiter(s) (50 mL/Hr) IV Continuous <Continuous>  dextrose 50% Injectable 25 Gram(s) IV Push once  dextrose 50% Injectable 12.5 Gram(s) IV Push once  dextrose 50% Injectable 25 Gram(s) IV Push once  diphenhydrAMINE Injectable 50 milliGRAM(s) IV Push once  folic acid 1 milliGRAM(s) Oral daily  glucagon  Injectable 1 milliGRAM(s) IntraMuscular once  glycopyrrolate 1 milliGRAM(s) Oral two times a day  insulin glargine Injectable (LANTUS) 10 Unit(s) SubCutaneous at bedtime  insulin lispro (ADMELOG) corrective regimen sliding scale   SubCutaneous every 6 hours  levETIRAcetam 1500 milliGRAM(s) Oral two times a day  metoprolol tartrate 25 milliGRAM(s) Enteral Tube two times a day  polyethylene glycol 3350 17 Gram(s) Oral daily  scopolamine 1 mG/72 Hr(s) Patch 1 Patch Transdermal every 72 hours  senna 2 Tablet(s) Oral at bedtime  silver sulfADIAZINE 1% Cream 1 Application(s) Topical two times a day  valproic  acid Syrup 750 milliGRAM(s) Oral <User Schedule>  vitamin A &amp; D Ointment 1 Application(s) Topical two times a day    MEDICATIONS  (PRN):  acetaminophen     Tablet .. 650 milliGRAM(s) Oral every 6 hours PRN Temp greater or equal to 38C (100.4F), Mild Pain (1 - 3)  albuterol/ipratropium for Nebulization 3 milliLiter(s) Nebulizer every 6 hours PRN Shortness of Breath and/or Wheezing  aluminum hydroxide/magnesium hydroxide/simethicone Suspension 30 milliLiter(s) Oral every 4 hours PRN Dyspepsia  dextrose Oral Gel 15 Gram(s) Oral once PRN Blood Glucose LESS THAN 70 milliGRAM(s)/deciliter    Pertinent Labs: 02-08 Na138 mmol/L Glu 158 mg/dL<H> K+ 4.1 mmol/L Cr  0.78 mg/dL BUN 28 mg/dL<H> 02-03 Alb 2.0 g/dL<L>02-03 ALT 11 U/L<L> AST 9 U/L<L> Alkaline Phosphatase 82 U/L  01-22-23 @ 06:42 a1c 5.9<H-improved since adm, c good glycemic control>  11/14/22, A1C=8.8% <H>       CAPILLARY BLOOD GLUCOSE      POCT Blood Glucose.: 166 mg/dL (08 Feb 2023 10:55)  POCT Blood Glucose.: 162 mg/dL (08 Feb 2023 06:15)  POCT Blood Glucose.: 164 mg/dL (07 Feb 2023 22:57)  POCT Blood Glucose.: 192 mg/dL (07 Feb 2023 16:41)    Skin:   02/07; WDL except pressure ulcer, IAD; perineum  (skin tear; 01/28, right temporal region, 01/31, right buttock, 01/29, left temporal region noted)  Pressure ulcer x 1  1. sacrum; stage IV    Estimated Needs:   [ x] no change since previous assessment(11/6& 11/23/22)  [ ] recalculated:     Previous Nutrition Diagnosis: (12/23)  [x ] Malnutrition; moderate malnutrition in context of acute illness   Related to: increased protein energy needs in setting of cardiac arrest, acute respiratory failure, s/p ELMER, shock liver, pressure ulcers/wounds  As evidenced by: physical findings of mild muscle wasting & mild/moderate fat depletion   GOAL: pt to meet >75% protein-energy needs via tube feeding; met   Nutrition Diagnosis is [ x] ongoing  [ ] resolved [ ] not applicable       New Nutrition Diagnosis: [ x] not applicable     Interventions:   Recommend  [ ] Change Diet To:  [ ] Nutrition Supplement  [ x] Nutrition Support; Continue c current enteral feeding regimen and water flushes as ordered   [ x] Other: swallow evaluation when pt is appropriate    Monitoring and Evaluation:   [ ] PO intake [ x ] Tolerance to diet prescription [ x ] weights [ x ] labs[ x ] follow up per protocol  [ x] other: BMs  [x] I/O

## 2023-02-08 NOTE — PROGRESS NOTE ADULT - SUBJECTIVE AND OBJECTIVE BOX
BRANDON MÉNDEZL    [facility] [address]    Allergies    No Known Allergies    Intolerances        PAST MEDICAL & SURGICAL HISTORY:  HTN (hypertension)      HLD (hyperlipidemia)      Diabetes mellitus      Lacunar infarction      BPH (benign prostatic hyperplasia)      CHF (congestive heart failure)      Chronic obstructive pulmonary disease, unspecified COPD type      S/P CABG (coronary artery bypass graft)  2014          FAMILY HISTORY:      Home Medications:  aspirin 81 mg oral delayed release tablet: 1 tab(s) orally once a day (12 Jun 2020 17:35)  Liquifilm Tears preserved ophthalmic solution: 1 drop(s) to each affected eye 2 times a day (12 Jun 2020 17:35)      MEDICATIONS  (STANDING):  apixaban 5 milliGRAM(s) Oral every 12 hours  aspirin  chewable 81 milliGRAM(s) Oral daily  atorvastatin 20 milliGRAM(s) Oral at bedtime  bacitracin   Ointment 1 Application(s) Topical two times a day  bacitracin   Ointment 1 Application(s) Topical two times a day  budesonide 160 MICROgram(s)/formoterol 4.5 MICROgram(s) Inhaler 2 Puff(s) Inhalation two times a day  chlorhexidine 2% Cloths 1 Application(s) Topical <User Schedule>  collagenase Ointment 1 Application(s) Topical two times a day  cyanocobalamin 1000 MICROGram(s) Oral daily  dextrose 5%. 1000 milliLiter(s) (100 mL/Hr) IV Continuous <Continuous>  dextrose 5%. 1000 milliLiter(s) (50 mL/Hr) IV Continuous <Continuous>  dextrose 50% Injectable 25 Gram(s) IV Push once  dextrose 50% Injectable 12.5 Gram(s) IV Push once  dextrose 50% Injectable 25 Gram(s) IV Push once  diphenhydrAMINE Injectable 50 milliGRAM(s) IV Push once  folic acid 1 milliGRAM(s) Oral daily  glucagon  Injectable 1 milliGRAM(s) IntraMuscular once  glycopyrrolate 1 milliGRAM(s) Oral two times a day  insulin glargine Injectable (LANTUS) 10 Unit(s) SubCutaneous at bedtime  insulin lispro (ADMELOG) corrective regimen sliding scale   SubCutaneous every 6 hours  levETIRAcetam 1500 milliGRAM(s) Oral two times a day  metoprolol tartrate 25 milliGRAM(s) Enteral Tube two times a day  polyethylene glycol 3350 17 Gram(s) Oral daily  scopolamine 1 mG/72 Hr(s) Patch 1 Patch Transdermal every 72 hours  senna 2 Tablet(s) Oral at bedtime  silver sulfADIAZINE 1% Cream 1 Application(s) Topical two times a day  valproic  acid Syrup 750 milliGRAM(s) Oral <User Schedule>  vitamin A &amp; D Ointment 1 Application(s) Topical two times a day    MEDICATIONS  (PRN):  acetaminophen     Tablet .. 650 milliGRAM(s) Oral every 6 hours PRN Temp greater or equal to 38C (100.4F), Mild Pain (1 - 3)  albuterol/ipratropium for Nebulization 3 milliLiter(s) Nebulizer every 6 hours PRN Shortness of Breath and/or Wheezing  aluminum hydroxide/magnesium hydroxide/simethicone Suspension 30 milliLiter(s) Oral every 4 hours PRN Dyspepsia  dextrose Oral Gel 15 Gram(s) Oral once PRN Blood Glucose LESS THAN 70 milliGRAM(s)/deciliter      Diet, NPO with Tube Feed:   Tube Feeding Modality: Gastrostomy  Glucerna 1.2 Pedrito  Total Volume for 24 Hours (mL): 1440  Continuous  Until Goal Tube Feed Rate (mL per Hour): 60  Tube Feed Duration (in Hours): 24  Tube Feed Start Time: 14:30  Free Water Flush  Free Water Flush Instructions:  30 ml/hr via pump  Liquid Protein Supplement     Qty per Day:  1 (01-09-23 @ 13:45) [Active]          Vital Signs Last 24 Hrs  T(C): 36.8 (08 Feb 2023 16:12), Max: 36.9 (08 Feb 2023 04:40)  T(F): 98.3 (08 Feb 2023 16:12), Max: 98.4 (08 Feb 2023 04:40)  HR: 67 (08 Feb 2023 16:12) (63 - 77)  BP: 106/70 (08 Feb 2023 16:12) (106/70 - 132/73)  BP(mean): 82 (08 Feb 2023 16:12) (82 - 92)  RR: 18 (08 Feb 2023 16:12) (17 - 19)  SpO2: 96% (08 Feb 2023 16:12) (96% - 98%)    Parameters below as of 08 Feb 2023 10:12  Patient On (Oxygen Delivery Method): tracheostomy collar          02-07-23 @ 07:01  -  02-08-23 @ 07:00  --------------------------------------------------------  IN: 1080 mL / OUT: 320 mL / NET: 760 mL              LABS:                        10.5   10.00 )-----------( 247      ( 08 Feb 2023 05:40 )             33.5     02-08    138  |  100  |  28<H>  ----------------------------<  158<H>  4.1   |  32<H>  |  0.78    Ca    8.4<L>      08 Feb 2023 05:40                WBC:  WBC Count: 10.00 K/uL (02-08 @ 05:40)      MICROBIOLOGY:  RECENT CULTURES:                  Sodium:  Sodium, Serum: 138 mmol/L (02-08 @ 05:40)      0.78 mg/dL 02-08 @ 05:40      Hemoglobin:  Hemoglobin: 10.5 g/dL (02-08 @ 05:40)      Platelets: Platelet Count - Automated: 247 K/uL (02-08 @ 05:40)              RADIOLOGY & ADDITIONAL STUDIES:      MICROBIOLOGY:  RECENT CULTURES:

## 2023-02-09 LAB
GLUCOSE BLDC GLUCOMTR-MCNC: 129 MG/DL — HIGH (ref 70–99)
GLUCOSE BLDC GLUCOMTR-MCNC: 139 MG/DL — HIGH (ref 70–99)
GLUCOSE BLDC GLUCOMTR-MCNC: 151 MG/DL — HIGH (ref 70–99)
GLUCOSE BLDC GLUCOMTR-MCNC: 166 MG/DL — HIGH (ref 70–99)
GLUCOSE BLDC GLUCOMTR-MCNC: 184 MG/DL — HIGH (ref 70–99)
GLUCOSE BLDC GLUCOMTR-MCNC: 196 MG/DL — HIGH (ref 70–99)

## 2023-02-09 PROCEDURE — 99232 SBSQ HOSP IP/OBS MODERATE 35: CPT

## 2023-02-09 RX ADMIN — APIXABAN 5 MILLIGRAM(S): 2.5 TABLET, FILM COATED ORAL at 17:40

## 2023-02-09 RX ADMIN — Medication 25 MILLIGRAM(S): at 05:51

## 2023-02-09 RX ADMIN — Medication 1 APPLICATION(S): at 17:41

## 2023-02-09 RX ADMIN — Medication 1: at 18:02

## 2023-02-09 RX ADMIN — Medication 1 APPLICATION(S): at 06:00

## 2023-02-09 RX ADMIN — POLYETHYLENE GLYCOL 3350 17 GRAM(S): 17 POWDER, FOR SOLUTION ORAL at 11:20

## 2023-02-09 RX ADMIN — SCOPALAMINE 1 PATCH: 1 PATCH, EXTENDED RELEASE TRANSDERMAL at 00:25

## 2023-02-09 RX ADMIN — Medication 81 MILLIGRAM(S): at 11:16

## 2023-02-09 RX ADMIN — PREGABALIN 1000 MICROGRAM(S): 225 CAPSULE ORAL at 11:16

## 2023-02-09 RX ADMIN — BUDESONIDE AND FORMOTEROL FUMARATE DIHYDRATE 2 PUFF(S): 160; 4.5 AEROSOL RESPIRATORY (INHALATION) at 05:53

## 2023-02-09 RX ADMIN — Medication 1 APPLICATION(S): at 17:48

## 2023-02-09 RX ADMIN — LEVETIRACETAM 1500 MILLIGRAM(S): 250 TABLET, FILM COATED ORAL at 05:51

## 2023-02-09 RX ADMIN — Medication 1 APPLICATION(S): at 05:54

## 2023-02-09 RX ADMIN — Medication 1: at 00:20

## 2023-02-09 RX ADMIN — Medication 1: at 11:15

## 2023-02-09 RX ADMIN — BUDESONIDE AND FORMOTEROL FUMARATE DIHYDRATE 2 PUFF(S): 160; 4.5 AEROSOL RESPIRATORY (INHALATION) at 17:40

## 2023-02-09 RX ADMIN — ROBINUL 1 MILLIGRAM(S): 0.2 INJECTION INTRAMUSCULAR; INTRAVENOUS at 05:53

## 2023-02-09 RX ADMIN — SCOPALAMINE 1 PATCH: 1 PATCH, EXTENDED RELEASE TRANSDERMAL at 09:41

## 2023-02-09 RX ADMIN — APIXABAN 5 MILLIGRAM(S): 2.5 TABLET, FILM COATED ORAL at 05:52

## 2023-02-09 RX ADMIN — ATORVASTATIN CALCIUM 20 MILLIGRAM(S): 80 TABLET, FILM COATED ORAL at 22:36

## 2023-02-09 RX ADMIN — LEVETIRACETAM 1500 MILLIGRAM(S): 250 TABLET, FILM COATED ORAL at 17:40

## 2023-02-09 RX ADMIN — INSULIN GLARGINE 10 UNIT(S): 100 INJECTION, SOLUTION SUBCUTANEOUS at 22:51

## 2023-02-09 RX ADMIN — ROBINUL 1 MILLIGRAM(S): 0.2 INJECTION INTRAMUSCULAR; INTRAVENOUS at 17:40

## 2023-02-09 RX ADMIN — Medication 25 MILLIGRAM(S): at 17:40

## 2023-02-09 RX ADMIN — Medication 750 MILLIGRAM(S): at 09:25

## 2023-02-09 RX ADMIN — Medication 750 MILLIGRAM(S): at 22:34

## 2023-02-09 RX ADMIN — Medication 1 APPLICATION(S): at 05:59

## 2023-02-09 RX ADMIN — Medication 1 MILLIGRAM(S): at 11:16

## 2023-02-09 RX ADMIN — SCOPALAMINE 1 PATCH: 1 PATCH, EXTENDED RELEASE TRANSDERMAL at 00:22

## 2023-02-09 RX ADMIN — SCOPALAMINE 1 PATCH: 1 PATCH, EXTENDED RELEASE TRANSDERMAL at 22:31

## 2023-02-09 RX ADMIN — Medication 1 APPLICATION(S): at 05:50

## 2023-02-09 RX ADMIN — SENNA PLUS 2 TABLET(S): 8.6 TABLET ORAL at 22:36

## 2023-02-09 RX ADMIN — CHLORHEXIDINE GLUCONATE 1 APPLICATION(S): 213 SOLUTION TOPICAL at 05:56

## 2023-02-09 NOTE — PROGRESS NOTE ADULT - SUBJECTIVE AND OBJECTIVE BOX
BRANDON MÉNDEZL    [facility] [address]    Allergies    No Known Allergies    Intolerances        PAST MEDICAL & SURGICAL HISTORY:  HTN (hypertension)      HLD (hyperlipidemia)      Diabetes mellitus      Lacunar infarction      BPH (benign prostatic hyperplasia)      CHF (congestive heart failure)      Chronic obstructive pulmonary disease, unspecified COPD type      S/P CABG (coronary artery bypass graft)  2014          FAMILY HISTORY:      Home Medications:  aspirin 81 mg oral delayed release tablet: 1 tab(s) orally once a day (12 Jun 2020 17:35)  Liquifilm Tears preserved ophthalmic solution: 1 drop(s) to each affected eye 2 times a day (12 Jun 2020 17:35)      MEDICATIONS  (STANDING):  apixaban 5 milliGRAM(s) Oral every 12 hours  aspirin  chewable 81 milliGRAM(s) Oral daily  atorvastatin 20 milliGRAM(s) Oral at bedtime  bacitracin   Ointment 1 Application(s) Topical two times a day  bacitracin   Ointment 1 Application(s) Topical two times a day  budesonide 160 MICROgram(s)/formoterol 4.5 MICROgram(s) Inhaler 2 Puff(s) Inhalation two times a day  chlorhexidine 2% Cloths 1 Application(s) Topical <User Schedule>  collagenase Ointment 1 Application(s) Topical two times a day  cyanocobalamin 1000 MICROGram(s) Oral daily  dextrose 5%. 1000 milliLiter(s) (100 mL/Hr) IV Continuous <Continuous>  dextrose 5%. 1000 milliLiter(s) (50 mL/Hr) IV Continuous <Continuous>  dextrose 50% Injectable 25 Gram(s) IV Push once  dextrose 50% Injectable 12.5 Gram(s) IV Push once  dextrose 50% Injectable 25 Gram(s) IV Push once  diphenhydrAMINE Injectable 50 milliGRAM(s) IV Push once  folic acid 1 milliGRAM(s) Oral daily  glucagon  Injectable 1 milliGRAM(s) IntraMuscular once  glycopyrrolate 1 milliGRAM(s) Oral two times a day  insulin glargine Injectable (LANTUS) 10 Unit(s) SubCutaneous at bedtime  insulin lispro (ADMELOG) corrective regimen sliding scale   SubCutaneous every 6 hours  levETIRAcetam 1500 milliGRAM(s) Oral two times a day  metoprolol tartrate 25 milliGRAM(s) Enteral Tube two times a day  polyethylene glycol 3350 17 Gram(s) Oral daily  scopolamine 1 mG/72 Hr(s) Patch 1 Patch Transdermal every 72 hours  senna 2 Tablet(s) Oral at bedtime  silver sulfADIAZINE 1% Cream 1 Application(s) Topical two times a day  valproic  acid Syrup 750 milliGRAM(s) Oral <User Schedule>  vitamin A &amp; D Ointment 1 Application(s) Topical two times a day    MEDICATIONS  (PRN):  acetaminophen     Tablet .. 650 milliGRAM(s) Oral every 6 hours PRN Temp greater or equal to 38C (100.4F), Mild Pain (1 - 3)  albuterol/ipratropium for Nebulization 3 milliLiter(s) Nebulizer every 6 hours PRN Shortness of Breath and/or Wheezing  aluminum hydroxide/magnesium hydroxide/simethicone Suspension 30 milliLiter(s) Oral every 4 hours PRN Dyspepsia  dextrose Oral Gel 15 Gram(s) Oral once PRN Blood Glucose LESS THAN 70 milliGRAM(s)/deciliter      Diet, NPO with Tube Feed:   Tube Feeding Modality: Gastrostomy  Glucerna 1.2 Pedrito  Total Volume for 24 Hours (mL): 1440  Continuous  Until Goal Tube Feed Rate (mL per Hour): 60  Tube Feed Duration (in Hours): 24  Tube Feed Start Time: 14:30  Free Water Flush  Free Water Flush Instructions:  30 ml/hr via pump  Liquid Protein Supplement     Qty per Day:  1 (01-09-23 @ 13:45) [Active]          Vital Signs Last 24 Hrs  T(C): 37 (09 Feb 2023 10:32), Max: 37 (08 Feb 2023 23:43)  T(F): 98.6 (09 Feb 2023 10:32), Max: 98.6 (08 Feb 2023 23:43)  HR: 70 (09 Feb 2023 10:32) (60 - 110)  BP: 107/66 (09 Feb 2023 10:32) (102/65 - 110/60)  BP(mean): 82 (08 Feb 2023 16:12) (82 - 82)  RR: 18 (09 Feb 2023 10:32) (17 - 18)  SpO2: 98% (09 Feb 2023 10:32) (96% - 99%)    Parameters below as of 09 Feb 2023 10:32  Patient On (Oxygen Delivery Method): tracheostomy collar          02-08-23 @ 07:01  -  02-09-23 @ 07:00  --------------------------------------------------------  IN: 720 mL / OUT: 1 mL / NET: 719 mL              LABS:                        10.5   10.00 )-----------( 247      ( 08 Feb 2023 05:40 )             33.5     02-08    138  |  100  |  28<H>  ----------------------------<  158<H>  4.1   |  32<H>  |  0.78    Ca    8.4<L>      08 Feb 2023 05:40                WBC:  WBC Count: 10.00 K/uL (02-08 @ 05:40)      MICROBIOLOGY:  RECENT CULTURES:                  Sodium:  Sodium, Serum: 138 mmol/L (02-08 @ 05:40)      0.78 mg/dL 02-08 @ 05:40      Hemoglobin:  Hemoglobin: 10.5 g/dL (02-08 @ 05:40)      Platelets: Platelet Count - Automated: 247 K/uL (02-08 @ 05:40)              RADIOLOGY & ADDITIONAL STUDIES:      MICROBIOLOGY:  RECENT CULTURES:

## 2023-02-09 NOTE — PROGRESS NOTE ADULT - ASSESSMENT
REVIEW OF SYMPTOMS      Able to give (reliable) ROS  NO     PHYSICAL EXAM    HEENT Unremarkable  atraumatic   RESP Fair air entry EXP prolonged    Harsh breath sound Resp distres mild   CARDIAC S1 S2 No S3     NO JVD    ABDOMEN SOFT BS PRESENT NOT DISTENDED No hepatosplenomegaly   PEDAL EDEMA present No calf tenderness  NO rash       GENERAL DATA .   GOC.  12/11/2022 full code       ALLGY.  nka                            WT. ..  12/2/2022 86  BMI. ..    12/2/2022 29                          ICU STAY.  .. 11/29-12/9  COVID.   .. 12/2/2022 scv2 (-)   .. 11/20/2022 scv2 (-)   BEST PRACTICE ISSUES.    HOB ELEVATN. Yes  DVT PPLX.    .. 1/3/2023 apixa 5.2 (vte)    (DVT 12/12 l Subclav throm)  ALEJO PPLX.   INFN PPLX.   .. 11/14 chlorhex 2%   SP SW ANTWAN.         DIET.    ..  12/15 glucerna 1.2 1440 gt   IV fl.    PROCEDURES.  .. 1/28  size 4 fenestrated cuffed trach placed by surgery   .. 12/19 debridement of sacral wound     ABGS.  1/28/2023 afeb 70 100/60   1/28/2023 tc 97%  1/6/2023 ac 16/450/.3/5 746/49/123     VS/ PO/IO/ VENT/ DRIPS.  2/9/2023 afeb 110 110/60   2/9/2023 tc 28% po 99%     PATIENT PRESENTATION.  72 m doa 11/14/2022 cac    PMH  PMH CAD s/p PCI with stent 2015 and CABG 2014,   PMH  s/p medtronic PPM,   PMH CVA (lacunar infarct)    HOSPITAL COURSE   .. 1) PEA arrest with ROSC after approximately 5 min 11/14  Now communicative   .. 2) DVT 12/12 l Subclav thromS 12/15/2022 lvnx 80.2 1/3 changed to apixaban 5.2  .. 3) TRACH 12/5/2022 trach  .. 4) PEG12/5/2022 peg   .. 5) Cellulitis hand absc 12/13 mod enterococcus fecalis  staph epi 12/12-12/15/2022  cephalexin 12/15 vanco once  12/16-12/19 zosyn  .. 6) Vent weaning       PROBLEM ASSESSMENT RECOMMENDATIONS.  Trach 12/5   .. trach care   Trach change  ..  1/28 size 4 fenestrated cuffed trach  Trach wean.  .. 2/6/2023 pt still has lot of secretions  VTE  .. On apixaba  INFECTION.  .. w 1/17-1/18-1/20-1/21-2/3-2/8/2023      w 8.3- 7.9 - 6.8 - 7.2 - 8.8  - 10  .. pr 1/17-1/20/2023 (-)  (-)   .. 1/16-1/20/2023 Rocephin started by hospitalist dced   CAD.  .. 12/13 metoprolol 25.2   CHF   .. Cr 2/6/2023 Cr .7   .. 11/14/2022 echo mod decr segmental lvsf apical lateral apiccal ant segment are abn takotsubo cmpthy pasp 42 .  .. Monitor for chf has chr hfref per echo   COPD   .. 2/1 duoneb p   .. 12/30 symbicort 160   .. 1/7/2023 glycopyrrolate 1.2   .. 1/9/2023 ipratropium  .. 1/15/2023 scopolamine   .. continue bd ics for copd   Anemia.  .. Hb 1/12-1/14-1/19-1/20-7/21-1/25-2/3-2/8/2023       Hb 9.8- 10 -9.1- 9.8  - 9.2 - 9.5 -10- 10   .. target hb 7 (+)  .. monitor   sp cac   .. good neuro recovery awake alert interactive   VTE  .. 1/3/2023 apixa 5.2 (vte)      OVERALL   WEANED OFF VENT 1/23   TRACH WEANING  Will start capping when secretions decrease  FLAILING OF LOWER EXTR   .. Jersks started 1/16   .. 1/19/2023 myoclonic jerks improvd on depakote       TIME SPENT   Over 25 minutes aggregate care time spent on encounter; activities included   direct patient care, counseling and/or coordinating care reviewing notes, lab data/ imaging , discussion with multidisciplinary team/ patient  /family and explaining in detail risks, benefits, alternatives  of the recommendations     BRANDON CAMARILLO

## 2023-02-10 LAB
ALBUMIN SERPL ELPH-MCNC: 2 G/DL — LOW (ref 3.3–5)
ALP SERPL-CCNC: 74 U/L — SIGNIFICANT CHANGE UP (ref 40–120)
ALT FLD-CCNC: 14 U/L — SIGNIFICANT CHANGE UP (ref 12–78)
ANION GAP SERPL CALC-SCNC: 6 MMOL/L — SIGNIFICANT CHANGE UP (ref 5–17)
AST SERPL-CCNC: 14 U/L — LOW (ref 15–37)
BILIRUB DIRECT SERPL-MCNC: 0.1 MG/DL — SIGNIFICANT CHANGE UP (ref 0–0.3)
BILIRUB INDIRECT FLD-MCNC: 0.1 MG/DL — LOW (ref 0.2–1)
BILIRUB SERPL-MCNC: 0.2 MG/DL — SIGNIFICANT CHANGE UP (ref 0.2–1.2)
BUN SERPL-MCNC: 30 MG/DL — HIGH (ref 7–23)
CALCIUM SERPL-MCNC: 8.5 MG/DL — SIGNIFICANT CHANGE UP (ref 8.5–10.1)
CHLORIDE SERPL-SCNC: 104 MMOL/L — SIGNIFICANT CHANGE UP (ref 96–108)
CO2 SERPL-SCNC: 32 MMOL/L — HIGH (ref 22–31)
CREAT SERPL-MCNC: 0.86 MG/DL — SIGNIFICANT CHANGE UP (ref 0.5–1.3)
EGFR: 91 ML/MIN/1.73M2 — SIGNIFICANT CHANGE UP
GLUCOSE BLDC GLUCOMTR-MCNC: 114 MG/DL — HIGH (ref 70–99)
GLUCOSE BLDC GLUCOMTR-MCNC: 162 MG/DL — HIGH (ref 70–99)
GLUCOSE BLDC GLUCOMTR-MCNC: 172 MG/DL — HIGH (ref 70–99)
GLUCOSE BLDC GLUCOMTR-MCNC: 180 MG/DL — HIGH (ref 70–99)
GLUCOSE BLDC GLUCOMTR-MCNC: 194 MG/DL — HIGH (ref 70–99)
GLUCOSE SERPL-MCNC: 185 MG/DL — HIGH (ref 70–99)
HCT VFR BLD CALC: 33.2 % — LOW (ref 39–50)
HGB BLD-MCNC: 10.5 G/DL — LOW (ref 13–17)
MAGNESIUM SERPL-MCNC: 2.4 MG/DL — SIGNIFICANT CHANGE UP (ref 1.6–2.6)
MCHC RBC-ENTMCNC: 29.9 PG — SIGNIFICANT CHANGE UP (ref 27–34)
MCHC RBC-ENTMCNC: 31.6 G/DL — LOW (ref 32–36)
MCV RBC AUTO: 94.6 FL — SIGNIFICANT CHANGE UP (ref 80–100)
NRBC # BLD: 0 /100 WBCS — SIGNIFICANT CHANGE UP (ref 0–0)
PHOSPHATE SERPL-MCNC: 3.3 MG/DL — SIGNIFICANT CHANGE UP (ref 2.5–4.5)
PLATELET # BLD AUTO: 284 K/UL — SIGNIFICANT CHANGE UP (ref 150–400)
POTASSIUM SERPL-MCNC: 4.2 MMOL/L — SIGNIFICANT CHANGE UP (ref 3.5–5.3)
POTASSIUM SERPL-SCNC: 4.2 MMOL/L — SIGNIFICANT CHANGE UP (ref 3.5–5.3)
PROT SERPL-MCNC: 5.8 GM/DL — LOW (ref 6–8.3)
RBC # BLD: 3.51 M/UL — LOW (ref 4.2–5.8)
RBC # FLD: 14.1 % — SIGNIFICANT CHANGE UP (ref 10.3–14.5)
SODIUM SERPL-SCNC: 142 MMOL/L — SIGNIFICANT CHANGE UP (ref 135–145)
WBC # BLD: 10.88 K/UL — HIGH (ref 3.8–10.5)
WBC # FLD AUTO: 10.88 K/UL — HIGH (ref 3.8–10.5)

## 2023-02-10 PROCEDURE — 99232 SBSQ HOSP IP/OBS MODERATE 35: CPT

## 2023-02-10 RX ADMIN — BUDESONIDE AND FORMOTEROL FUMARATE DIHYDRATE 2 PUFF(S): 160; 4.5 AEROSOL RESPIRATORY (INHALATION) at 05:53

## 2023-02-10 RX ADMIN — Medication 650 MILLIGRAM(S): at 13:50

## 2023-02-10 RX ADMIN — Medication 1 APPLICATION(S): at 17:52

## 2023-02-10 RX ADMIN — Medication 1 MILLIGRAM(S): at 11:58

## 2023-02-10 RX ADMIN — BUDESONIDE AND FORMOTEROL FUMARATE DIHYDRATE 2 PUFF(S): 160; 4.5 AEROSOL RESPIRATORY (INHALATION) at 17:52

## 2023-02-10 RX ADMIN — ROBINUL 1 MILLIGRAM(S): 0.2 INJECTION INTRAMUSCULAR; INTRAVENOUS at 17:52

## 2023-02-10 RX ADMIN — Medication 650 MILLIGRAM(S): at 13:20

## 2023-02-10 RX ADMIN — Medication 1: at 00:24

## 2023-02-10 RX ADMIN — Medication 3 MILLILITER(S): at 11:59

## 2023-02-10 RX ADMIN — ROBINUL 1 MILLIGRAM(S): 0.2 INJECTION INTRAMUSCULAR; INTRAVENOUS at 05:52

## 2023-02-10 RX ADMIN — Medication 81 MILLIGRAM(S): at 11:57

## 2023-02-10 RX ADMIN — Medication 1: at 11:57

## 2023-02-10 RX ADMIN — LEVETIRACETAM 1500 MILLIGRAM(S): 250 TABLET, FILM COATED ORAL at 05:45

## 2023-02-10 RX ADMIN — Medication 750 MILLIGRAM(S): at 08:02

## 2023-02-10 RX ADMIN — Medication 1 APPLICATION(S): at 05:55

## 2023-02-10 RX ADMIN — SENNA PLUS 2 TABLET(S): 8.6 TABLET ORAL at 21:38

## 2023-02-10 RX ADMIN — Medication 1: at 17:51

## 2023-02-10 RX ADMIN — CHLORHEXIDINE GLUCONATE 1 APPLICATION(S): 213 SOLUTION TOPICAL at 05:53

## 2023-02-10 RX ADMIN — PREGABALIN 1000 MICROGRAM(S): 225 CAPSULE ORAL at 11:57

## 2023-02-10 RX ADMIN — Medication 1 APPLICATION(S): at 17:50

## 2023-02-10 RX ADMIN — POLYETHYLENE GLYCOL 3350 17 GRAM(S): 17 POWDER, FOR SOLUTION ORAL at 11:58

## 2023-02-10 RX ADMIN — Medication 1 APPLICATION(S): at 05:57

## 2023-02-10 RX ADMIN — APIXABAN 5 MILLIGRAM(S): 2.5 TABLET, FILM COATED ORAL at 17:50

## 2023-02-10 RX ADMIN — Medication 25 MILLIGRAM(S): at 05:51

## 2023-02-10 RX ADMIN — ATORVASTATIN CALCIUM 20 MILLIGRAM(S): 80 TABLET, FILM COATED ORAL at 21:38

## 2023-02-10 RX ADMIN — Medication 1 APPLICATION(S): at 05:56

## 2023-02-10 RX ADMIN — Medication 1 APPLICATION(S): at 17:51

## 2023-02-10 RX ADMIN — Medication 750 MILLIGRAM(S): at 20:43

## 2023-02-10 RX ADMIN — SCOPALAMINE 1 PATCH: 1 PATCH, EXTENDED RELEASE TRANSDERMAL at 08:00

## 2023-02-10 RX ADMIN — SCOPALAMINE 1 PATCH: 1 PATCH, EXTENDED RELEASE TRANSDERMAL at 20:44

## 2023-02-10 RX ADMIN — LEVETIRACETAM 1500 MILLIGRAM(S): 250 TABLET, FILM COATED ORAL at 17:49

## 2023-02-10 RX ADMIN — APIXABAN 5 MILLIGRAM(S): 2.5 TABLET, FILM COATED ORAL at 05:52

## 2023-02-10 RX ADMIN — INSULIN GLARGINE 10 UNIT(S): 100 INJECTION, SOLUTION SUBCUTANEOUS at 21:37

## 2023-02-10 RX ADMIN — Medication 1 APPLICATION(S): at 05:52

## 2023-02-10 NOTE — PROGRESS NOTE ADULT - SUBJECTIVE AND OBJECTIVE BOX
Patient is a 72y old  Male who presents with a chief complaint of s/p cardiac arrest, hypoglycemia (07 Feb 2023 11:09)      OVERNIGHT EVENTS:  Patients seen and examined at bedside this morning. No acute events overnight.    REVIEW OF SYSTEMS: denies chest pain/SOB, diaphoresis, no F/C, cough, dizziness, headache, blurry vision, nausea, vomiting, abdominal pain. All others review of systems negative     MEDICATIONS  (STANDING):  apixaban 5 milliGRAM(s) Oral every 12 hours  aspirin  chewable 81 milliGRAM(s) Oral daily  atorvastatin 20 milliGRAM(s) Oral at bedtime  bacitracin   Ointment 1 Application(s) Topical two times a day  bacitracin   Ointment 1 Application(s) Topical two times a day  budesonide 160 MICROgram(s)/formoterol 4.5 MICROgram(s) Inhaler 2 Puff(s) Inhalation two times a day  chlorhexidine 2% Cloths 1 Application(s) Topical <User Schedule>  collagenase Ointment 1 Application(s) Topical two times a day  cyanocobalamin 1000 MICROGram(s) Oral daily  dextrose 5%. 1000 milliLiter(s) (50 mL/Hr) IV Continuous <Continuous>  dextrose 5%. 1000 milliLiter(s) (100 mL/Hr) IV Continuous <Continuous>  dextrose 50% Injectable 25 Gram(s) IV Push once  dextrose 50% Injectable 12.5 Gram(s) IV Push once  dextrose 50% Injectable 25 Gram(s) IV Push once  diphenhydrAMINE Injectable 50 milliGRAM(s) IV Push once  folic acid 1 milliGRAM(s) Oral daily  glucagon  Injectable 1 milliGRAM(s) IntraMuscular once  glycopyrrolate 1 milliGRAM(s) Oral two times a day  insulin glargine Injectable (LANTUS) 10 Unit(s) SubCutaneous at bedtime  insulin lispro (ADMELOG) corrective regimen sliding scale   SubCutaneous every 6 hours  levETIRAcetam 1500 milliGRAM(s) Oral two times a day  metoprolol tartrate 25 milliGRAM(s) Oral two times a day  polyethylene glycol 3350 17 Gram(s) Oral daily  scopolamine 1 mG/72 Hr(s) Patch 1 Patch Transdermal every 72 hours  senna 2 Tablet(s) Oral at bedtime  silver sulfADIAZINE 1% Cream 1 Application(s) Topical two times a day  valproic  acid Syrup 750 milliGRAM(s) Oral <User Schedule>  vitamin A &amp; D Ointment 1 Application(s) Topical two times a day    MEDICATIONS  (PRN):  acetaminophen     Tablet .. 650 milliGRAM(s) Oral every 6 hours PRN Temp greater or equal to 38C (100.4F), Mild Pain (1 - 3)  albuterol/ipratropium for Nebulization 3 milliLiter(s) Nebulizer every 6 hours PRN Shortness of Breath and/or Wheezing  aluminum hydroxide/magnesium hydroxide/simethicone Suspension 30 milliLiter(s) Oral every 4 hours PRN Dyspepsia  dextrose Oral Gel 15 Gram(s) Oral once PRN Blood Glucose LESS THAN 70 milliGRAM(s)/deciliter      Allergies    No Known Allergies    Intolerances      ICU Vital Signs Last 24 Hrs  T(C): 37.4 (10 Feb 2023 23:49), Max: 37.4 (10 Feb 2023 23:49)  T(F): 99.3 (10 Feb 2023 23:49), Max: 99.3 (10 Feb 2023 23:49)  HR: 63 (10 Feb 2023 23:49) (62 - 74)  BP: 114/73 (10 Feb 2023 23:49) (104/68 - 120/60)  BP(mean): 80 (10 Feb 2023 16:30) (80 - 80)  ABP: --  ABP(mean): --  RR: 20 (10 Feb 2023 23:49) (18 - 20)  SpO2: 100% (10 Feb 2023 23:49) (96% - 100%)    O2 Parameters below as of 10 Feb 2023 23:49  Patient On (Oxygen Delivery Method): tracheostomy collar          PHYSICAL EXAM:  GENERAL: NAD  HEAD:  Atraumatic, Normocephalic  EYES: PERRLA, conjunctiva and sclera clear  ENMT: Moist mucous membranes  NECK: Supple, No JVD, Normal thyroid  NERVOUS SYSTEM:  Alert & Awake  CHEST/LUNG: Clear to percussion bilaterally;   HEART: Regular rate and rhythm;   ABDOMEN: Soft, Nontender, Nondistended; Bowel sounds present  EXTREMITIES:  no edema BL LE  SKIN: moist nail fungus b/l feet                             10.5   10.88 )-----------( 284      ( 10 Feb 2023 08:35 )             33.2     02-10    142  |  104  |  30<H>  ----------------------------<  185<H>  4.2   |  32<H>  |  0.86    Ca    8.5      10 Feb 2023 08:35  Phos  3.3     02-10  Mg     2.4     02-10    TPro  5.8<L>  /  Alb  2.0<L>  /  TBili  0.2  /  DBili  0.1  /  AST  14<L>  /  ALT  14  /  AlkPhos  74  02-10                RADIOLOGY & ADDITIONAL TESTS:    Imaging Personally Reviewed:  [x ] YES      Consultant(s) Notes Reviewed:  [x ] YES     Care Discussed with [x ] Consultants [X ] Patient [ ] Family  [x ]    [x ]  Other; RN

## 2023-02-10 NOTE — PROGRESS NOTE ADULT - SUBJECTIVE AND OBJECTIVE BOX
BRANDON MÉNDEZL    [facility] [address]    Allergies    No Known Allergies    Intolerances        PAST MEDICAL & SURGICAL HISTORY:  HTN (hypertension)      HLD (hyperlipidemia)      Diabetes mellitus      Lacunar infarction      BPH (benign prostatic hyperplasia)      CHF (congestive heart failure)      Chronic obstructive pulmonary disease, unspecified COPD type      S/P CABG (coronary artery bypass graft)  2014          FAMILY HISTORY:      Home Medications:  aspirin 81 mg oral delayed release tablet: 1 tab(s) orally once a day (12 Jun 2020 17:35)  Liquifilm Tears preserved ophthalmic solution: 1 drop(s) to each affected eye 2 times a day (12 Jun 2020 17:35)      MEDICATIONS  (STANDING):  apixaban 5 milliGRAM(s) Oral every 12 hours  aspirin  chewable 81 milliGRAM(s) Oral daily  atorvastatin 20 milliGRAM(s) Oral at bedtime  bacitracin   Ointment 1 Application(s) Topical two times a day  bacitracin   Ointment 1 Application(s) Topical two times a day  budesonide 160 MICROgram(s)/formoterol 4.5 MICROgram(s) Inhaler 2 Puff(s) Inhalation two times a day  chlorhexidine 2% Cloths 1 Application(s) Topical <User Schedule>  collagenase Ointment 1 Application(s) Topical two times a day  cyanocobalamin 1000 MICROGram(s) Oral daily  dextrose 5%. 1000 milliLiter(s) (50 mL/Hr) IV Continuous <Continuous>  dextrose 5%. 1000 milliLiter(s) (100 mL/Hr) IV Continuous <Continuous>  dextrose 50% Injectable 25 Gram(s) IV Push once  dextrose 50% Injectable 12.5 Gram(s) IV Push once  dextrose 50% Injectable 25 Gram(s) IV Push once  diphenhydrAMINE Injectable 50 milliGRAM(s) IV Push once  folic acid 1 milliGRAM(s) Oral daily  glucagon  Injectable 1 milliGRAM(s) IntraMuscular once  glycopyrrolate 1 milliGRAM(s) Oral two times a day  insulin glargine Injectable (LANTUS) 10 Unit(s) SubCutaneous at bedtime  insulin lispro (ADMELOG) corrective regimen sliding scale   SubCutaneous every 6 hours  levETIRAcetam 1500 milliGRAM(s) Oral two times a day  metoprolol tartrate 25 milliGRAM(s) Enteral Tube two times a day  polyethylene glycol 3350 17 Gram(s) Oral daily  scopolamine 1 mG/72 Hr(s) Patch 1 Patch Transdermal every 72 hours  senna 2 Tablet(s) Oral at bedtime  silver sulfADIAZINE 1% Cream 1 Application(s) Topical two times a day  valproic  acid Syrup 750 milliGRAM(s) Oral <User Schedule>  vitamin A &amp; D Ointment 1 Application(s) Topical two times a day    MEDICATIONS  (PRN):  acetaminophen     Tablet .. 650 milliGRAM(s) Oral every 6 hours PRN Temp greater or equal to 38C (100.4F), Mild Pain (1 - 3)  albuterol/ipratropium for Nebulization 3 milliLiter(s) Nebulizer every 6 hours PRN Shortness of Breath and/or Wheezing  aluminum hydroxide/magnesium hydroxide/simethicone Suspension 30 milliLiter(s) Oral every 4 hours PRN Dyspepsia  dextrose Oral Gel 15 Gram(s) Oral once PRN Blood Glucose LESS THAN 70 milliGRAM(s)/deciliter      Diet, NPO with Tube Feed:   Tube Feeding Modality: Gastrostomy  Glucerna 1.2 Pedrito  Total Volume for 24 Hours (mL): 1440  Continuous  Until Goal Tube Feed Rate (mL per Hour): 60  Tube Feed Duration (in Hours): 24  Tube Feed Start Time: 14:30  Free Water Flush  Free Water Flush Instructions:  30 ml/hr via pump  Liquid Protein Supplement     Qty per Day:  1 (01-09-23 @ 13:45) [Active]          Vital Signs Last 24 Hrs  T(C): 37.2 (10 Feb 2023 04:40), Max: 37.2 (10 Feb 2023 04:40)  T(F): 99 (10 Feb 2023 04:40), Max: 99 (10 Feb 2023 04:40)  HR: 64 (10 Feb 2023 06:10) (59 - 92)  BP: 112/72 (10 Feb 2023 04:40) (102/65 - 112/72)  BP(mean): --  RR: 18 (10 Feb 2023 06:10) (18 - 18)  SpO2: 99% (10 Feb 2023 06:10) (96% - 100%)    Parameters below as of 10 Feb 2023 06:10  Patient On (Oxygen Delivery Method): tracheostomy collar  O2 Flow (L/min): 6  O2 Concentration (%): 28              LABS:                        10.5   10.88 )-----------( 284      ( 10 Feb 2023 08:35 )             33.2     02-10    142  |  104  |  30<H>  ----------------------------<  185<H>  4.2   |  32<H>  |  0.86    Ca    8.5      10 Feb 2023 08:35  Mg     2.4     02-10                WBC:  WBC Count: 10.88 K/uL (02-10 @ 08:35)  WBC Count: 10.00 K/uL (02-08 @ 05:40)      MICROBIOLOGY:  RECENT CULTURES:                  Sodium:  Sodium, Serum: 142 mmol/L (02-10 @ 08:35)  Sodium, Serum: 138 mmol/L (02-08 @ 05:40)      0.86 mg/dL 02-10 @ 08:35  0.78 mg/dL 02-08 @ 05:40      Hemoglobin:  Hemoglobin: 10.5 g/dL (02-10 @ 08:35)  Hemoglobin: 10.5 g/dL (02-08 @ 05:40)      Platelets: Platelet Count - Automated: 284 K/uL (02-10 @ 08:35)  Platelet Count - Automated: 247 K/uL (02-08 @ 05:40)              RADIOLOGY & ADDITIONAL STUDIES:      MICROBIOLOGY:  RECENT CULTURES:

## 2023-02-10 NOTE — PROGRESS NOTE ADULT - TIME BILLING
Lab test review, Radiology Review, Vitals review, Consultant review and discussion, Physical examination, IDR, Assessment and plan; Plan discussion with patient and family
min spent reviewing chart, examining patient, discussing plan with patient and family and staff, writing progress note and placing orders.
min spent reviewing chart, examining patient, discussing plan with patient and family and staff, writing progress note and placing orders.
min spent reviewing chart, examining patient, discussing plan with patient and family
min spent reviewing chart, examining patient, discussing plan with patient and family and staff, writing progress note and placing orders.
Lab test review, Radiology Review, Vitals review, Consultant review and discussion, Physical examination, IDR, Assessment and plan; Plan discussion with patient and family
min spent reviewing chart, examining patient, discussing plan with patient and family
min spent reviewing chart, examining patient, discussing plan with patient and family and staff, writing progress note and placing orders.
Lab test review, Radiology Review, Vitals review, Consultant review and discussion, Physical examination, IDR, Assessment and plan; Plan discussion with patient and family
min spent reviewing chart, examining patient, discussing plan with patient and family
Lab test review, Radiology Review, Vitals review, Consultant review and discussion, Physical examination, IDR, Assessment and plan; Plan discussion with patient and family
Lab test review, Radiology Review, Vitals review, Consultant review and discussion, Physical examination, IDR, Assessment and plan; Plan discussion with patient and family
min spent reviewing chart, examining patient, discussing plan with patient and family
Lab test review, Radiology Review, Vitals review, Consultant review and discussion, Physical examination, IDR, Assessment and plan; Plan discussion with patient and family
min spent reviewing chart, examining patient, discussing plan with patient and family
Care plan, care coordination discussed with patient and/or family, labs and imaging were reviewed.  Plan of care also discussed with consultants, case management, and social work.
min spent reviewing chart, examining patient, discussing plan with patient and family

## 2023-02-10 NOTE — PROGRESS NOTE ADULT - ASSESSMENT
REVIEW OF SYMPTOMS      Able to give (reliable) ROS  NO     PHYSICAL EXAM    HEENT Unremarkable  atraumatic   RESP Fair air entry EXP prolonged    Harsh breath sound Resp distres mild   CARDIAC S1 S2 No S3     NO JVD    ABDOMEN SOFT BS PRESENT NOT DISTENDED No hepatosplenomegaly   PEDAL EDEMA present No calf tenderness  NO rash       GENERAL DATA .   GOC.  12/11/2022 full code       ALLGY.  nka                            WT. ..  12/2/2022 86  BMI. ..    12/2/2022 29                          ICU STAY.  .. 11/29-12/9  COVID.   .. 12/2/2022 scv2 (-)   .. 11/20/2022 scv2 (-)   BEST PRACTICE ISSUES.    HOB ELEVATN. Yes  DVT PPLX.    .. 1/3/2023 apixa 5.2 (vte)    (DVT 12/12 l Subclav throm)  ALEJO PPLX.   INFN PPLX.   .. 11/14 chlorhex 2%   SP SW ANTWAN.         DIET.    ..  12/15 glucerna 1.2 1440 gt   IV fl.    PROCEDURES.  .. 1/28  size 4 fenestrated cuffed trach placed by surgery   .. 12/19 debridement of sacral wound   .. 12/12 r arm midline     ABGS.  1/28/2023 afeb 70 100/60   1/28/2023 tc 97%  1/6/2023 ac 16/450/.3/5 746/49/123     VS/ PO/IO/ VENT/ DRIPS.  2/10/2023 afeb 62 110/70   2/10/2023 tc 6l 28% 99%     PATIENT PRESENTATION.  72 m doa 11/14/2022 cac    PMH  PMH CAD s/p PCI with stent 2015 and CABG 2014,   PMH  s/p medtronic PPM,   PMH CVA (lacunar infarct)    HOSPITAL COURSE   .. 1) PEA arrest with ROSC after approximately 5 min 11/14  Now communicative   .. 2) DVT 12/12 l Subclav thromS 12/15/2022 lvnx 80.2 1/3 changed to apixaban 5.2  .. 3) TRACH 12/5/2022 trach  .. 4) PEG12/5/2022 peg   .. 5) Cellulitis hand absc 12/13 mod enterococcus fecalis  staph epi 12/12-12/15/2022  cephalexin 12/15 vanco once  12/16-12/19 zosyn  .. 6) Vent weaning       PROBLEM ASSESSMENT RECOMMENDATIONS.  Trach 12/5   .. trach care   Trach change  ..  1/28 size 4 fenestrated cuffed trach  Trach wean.  .. 2/6/2023 pt still has lot of secretions  VTE  .. On apixaba  INFECTION.  .. w 1/17-1/18-1/20-1/21-2/3-2/8-2/10/2023      w 8.3- 7.9 - 6.8 - 7.2 - 8.8  - 10- 10.8  .. pr 1/17-1/20/2023 (-)  (-)   .. 1/16-1/20/2023 Rocephin started by hospitalist dcegabo   CAD.  .. 12/13 metoprolol 25.2   CHF   .. Cr 2/6/2023 Cr .7   .. 11/14/2022 echo mod decr segmental lvsf apical lateral apiccal ant segment are abn takotsubo cmpthy pasp 42 .  .. Monitor for chf has chr hfref per echo   COPD   .. 2/1 duoneb p   .. 12/30 symbicort 160   .. 1/7/2023 glycopyrrolate 1.2   .. 1/9/2023 ipratropium  .. 1/15/2023 scopolamine   .. continue bd ics for copd   Anemia.  .. Hb 1/12-1/14-1/19-1/20-7/21-1/25-2/3-2/8-2/10/2023       Hb 9.8- 10 -9.1- 9.8  - 9.2 - 9.5 -10- 10 - 10.5   .. target hb 7 (+)  .. monitor   sp cac   .. good neuro recovery awake alert interactive   VTE  .. 1/3/2023 apixa 5.2 (vte)      OVERALL   WEANED OFF VENT 1/23   TRACH WEANING  Will start capping when secretions decrease  FLAILING OF LOWER EXTR   .. Jersks started 1/16   .. 1/19/2023 myoclonic jerks improvd on depakote         TIME SPENT   Over 25 minutes aggregate care time spent on encounter; activities included   direct patient care, counseling and/or coordinating care reviewing notes, lab data/ imaging , discussion with multidisciplinary team/ patient  /family and explaining in detail risks, benefits, alternatives  of the recommendations     BRANDON CAMARILLO

## 2023-02-11 LAB
GLUCOSE BLDC GLUCOMTR-MCNC: 137 MG/DL — HIGH (ref 70–99)
GLUCOSE BLDC GLUCOMTR-MCNC: 191 MG/DL — HIGH (ref 70–99)
GLUCOSE BLDC GLUCOMTR-MCNC: 236 MG/DL — HIGH (ref 70–99)
GLUCOSE BLDC GLUCOMTR-MCNC: 249 MG/DL — HIGH (ref 70–99)
GLUCOSE BLDC GLUCOMTR-MCNC: 289 MG/DL — HIGH (ref 70–99)
GLUCOSE BLDC GLUCOMTR-MCNC: 294 MG/DL — HIGH (ref 70–99)

## 2023-02-11 PROCEDURE — 99232 SBSQ HOSP IP/OBS MODERATE 35: CPT

## 2023-02-11 RX ADMIN — SCOPALAMINE 1 PATCH: 1 PATCH, EXTENDED RELEASE TRANSDERMAL at 23:19

## 2023-02-11 RX ADMIN — Medication 750 MILLIGRAM(S): at 09:33

## 2023-02-11 RX ADMIN — Medication 1 APPLICATION(S): at 17:20

## 2023-02-11 RX ADMIN — Medication 3: at 17:16

## 2023-02-11 RX ADMIN — Medication 1 APPLICATION(S): at 06:33

## 2023-02-11 RX ADMIN — INSULIN GLARGINE 10 UNIT(S): 100 INJECTION, SOLUTION SUBCUTANEOUS at 22:05

## 2023-02-11 RX ADMIN — Medication 1 APPLICATION(S): at 17:18

## 2023-02-11 RX ADMIN — Medication 81 MILLIGRAM(S): at 11:38

## 2023-02-11 RX ADMIN — Medication 25 MILLIGRAM(S): at 17:19

## 2023-02-11 RX ADMIN — Medication 1 APPLICATION(S): at 06:35

## 2023-02-11 RX ADMIN — APIXABAN 5 MILLIGRAM(S): 2.5 TABLET, FILM COATED ORAL at 06:38

## 2023-02-11 RX ADMIN — Medication 1 APPLICATION(S): at 07:25

## 2023-02-11 RX ADMIN — Medication 750 MILLIGRAM(S): at 22:10

## 2023-02-11 RX ADMIN — LEVETIRACETAM 1500 MILLIGRAM(S): 250 TABLET, FILM COATED ORAL at 17:18

## 2023-02-11 RX ADMIN — BUDESONIDE AND FORMOTEROL FUMARATE DIHYDRATE 2 PUFF(S): 160; 4.5 AEROSOL RESPIRATORY (INHALATION) at 06:34

## 2023-02-11 RX ADMIN — POLYETHYLENE GLYCOL 3350 17 GRAM(S): 17 POWDER, FOR SOLUTION ORAL at 11:40

## 2023-02-11 RX ADMIN — ROBINUL 1 MILLIGRAM(S): 0.2 INJECTION INTRAMUSCULAR; INTRAVENOUS at 06:34

## 2023-02-11 RX ADMIN — ATORVASTATIN CALCIUM 20 MILLIGRAM(S): 80 TABLET, FILM COATED ORAL at 22:10

## 2023-02-11 RX ADMIN — Medication 2: at 06:26

## 2023-02-11 RX ADMIN — ROBINUL 1 MILLIGRAM(S): 0.2 INJECTION INTRAMUSCULAR; INTRAVENOUS at 17:20

## 2023-02-11 RX ADMIN — LEVETIRACETAM 1500 MILLIGRAM(S): 250 TABLET, FILM COATED ORAL at 06:34

## 2023-02-11 RX ADMIN — Medication 1 APPLICATION(S): at 06:32

## 2023-02-11 RX ADMIN — CHLORHEXIDINE GLUCONATE 1 APPLICATION(S): 213 SOLUTION TOPICAL at 06:35

## 2023-02-11 RX ADMIN — PREGABALIN 1000 MICROGRAM(S): 225 CAPSULE ORAL at 11:40

## 2023-02-11 RX ADMIN — Medication 1 MILLIGRAM(S): at 11:40

## 2023-02-11 RX ADMIN — APIXABAN 5 MILLIGRAM(S): 2.5 TABLET, FILM COATED ORAL at 17:19

## 2023-02-11 RX ADMIN — SCOPALAMINE 1 PATCH: 1 PATCH, EXTENDED RELEASE TRANSDERMAL at 07:21

## 2023-02-11 RX ADMIN — Medication 25 MILLIGRAM(S): at 06:34

## 2023-02-11 RX ADMIN — SCOPALAMINE 1 PATCH: 1 PATCH, EXTENDED RELEASE TRANSDERMAL at 23:27

## 2023-02-11 RX ADMIN — Medication 1: at 11:39

## 2023-02-11 RX ADMIN — BUDESONIDE AND FORMOTEROL FUMARATE DIHYDRATE 2 PUFF(S): 160; 4.5 AEROSOL RESPIRATORY (INHALATION) at 17:09

## 2023-02-11 RX ADMIN — Medication 3: at 23:45

## 2023-02-11 RX ADMIN — Medication 1 APPLICATION(S): at 17:17

## 2023-02-11 NOTE — PROGRESS NOTE ADULT - SUBJECTIVE AND OBJECTIVE BOX
BRANDON MÉNDEZL    [facility] [address]    Allergies    No Known Allergies    Intolerances        PAST MEDICAL & SURGICAL HISTORY:  HTN (hypertension)      HLD (hyperlipidemia)      Diabetes mellitus      Lacunar infarction      BPH (benign prostatic hyperplasia)      CHF (congestive heart failure)      Chronic obstructive pulmonary disease, unspecified COPD type      S/P CABG (coronary artery bypass graft)  2014          FAMILY HISTORY:      Home Medications:  aspirin 81 mg oral delayed release tablet: 1 tab(s) orally once a day (12 Jun 2020 17:35)  Liquifilm Tears preserved ophthalmic solution: 1 drop(s) to each affected eye 2 times a day (12 Jun 2020 17:35)      MEDICATIONS  (STANDING):  apixaban 5 milliGRAM(s) Oral every 12 hours  aspirin  chewable 81 milliGRAM(s) Oral daily  atorvastatin 20 milliGRAM(s) Oral at bedtime  bacitracin   Ointment 1 Application(s) Topical two times a day  bacitracin   Ointment 1 Application(s) Topical two times a day  budesonide 160 MICROgram(s)/formoterol 4.5 MICROgram(s) Inhaler 2 Puff(s) Inhalation two times a day  chlorhexidine 2% Cloths 1 Application(s) Topical <User Schedule>  collagenase Ointment 1 Application(s) Topical two times a day  cyanocobalamin 1000 MICROGram(s) Oral daily  dextrose 5%. 1000 milliLiter(s) (50 mL/Hr) IV Continuous <Continuous>  dextrose 5%. 1000 milliLiter(s) (100 mL/Hr) IV Continuous <Continuous>  dextrose 50% Injectable 25 Gram(s) IV Push once  dextrose 50% Injectable 25 Gram(s) IV Push once  dextrose 50% Injectable 12.5 Gram(s) IV Push once  diphenhydrAMINE Injectable 50 milliGRAM(s) IV Push once  folic acid 1 milliGRAM(s) Oral daily  glucagon  Injectable 1 milliGRAM(s) IntraMuscular once  glycopyrrolate 1 milliGRAM(s) Oral two times a day  insulin glargine Injectable (LANTUS) 10 Unit(s) SubCutaneous at bedtime  insulin lispro (ADMELOG) corrective regimen sliding scale   SubCutaneous every 6 hours  levETIRAcetam 1500 milliGRAM(s) Oral two times a day  metoprolol tartrate 25 milliGRAM(s) Enteral Tube two times a day  polyethylene glycol 3350 17 Gram(s) Oral daily  scopolamine 1 mG/72 Hr(s) Patch 1 Patch Transdermal every 72 hours  senna 2 Tablet(s) Oral at bedtime  silver sulfADIAZINE 1% Cream 1 Application(s) Topical two times a day  valproic  acid Syrup 750 milliGRAM(s) Oral <User Schedule>  vitamin A &amp; D Ointment 1 Application(s) Topical two times a day    MEDICATIONS  (PRN):  acetaminophen     Tablet .. 650 milliGRAM(s) Oral every 6 hours PRN Temp greater or equal to 38C (100.4F), Mild Pain (1 - 3)  albuterol/ipratropium for Nebulization 3 milliLiter(s) Nebulizer every 6 hours PRN Shortness of Breath and/or Wheezing  aluminum hydroxide/magnesium hydroxide/simethicone Suspension 30 milliLiter(s) Oral every 4 hours PRN Dyspepsia  dextrose Oral Gel 15 Gram(s) Oral once PRN Blood Glucose LESS THAN 70 milliGRAM(s)/deciliter      Diet, NPO with Tube Feed:   Tube Feeding Modality: Gastrostomy  Glucerna 1.2 Pedrito  Total Volume for 24 Hours (mL): 1440  Continuous  Until Goal Tube Feed Rate (mL per Hour): 60  Tube Feed Duration (in Hours): 24  Tube Feed Start Time: 14:30  Free Water Flush  Free Water Flush Instructions:  30 ml/hr via pump  Liquid Protein Supplement     Qty per Day:  1 (01-09-23 @ 13:45) [Active]          Vital Signs Last 24 Hrs  T(C): 36.9 (11 Feb 2023 04:34), Max: 37.4 (10 Feb 2023 23:49)  T(F): 98.5 (11 Feb 2023 04:34), Max: 99.3 (10 Feb 2023 23:49)  HR: 74 (11 Feb 2023 08:58) (63 - 77)  BP: 112/71 (11 Feb 2023 06:29) (104/68 - 120/60)  BP(mean): 80 (10 Feb 2023 16:30) (80 - 80)  RR: 20 (11 Feb 2023 04:34) (18 - 20)  SpO2: 100% (11 Feb 2023 08:58) (96% - 100%)    Parameters below as of 11 Feb 2023 08:58  Patient On (Oxygen Delivery Method): tracheostomy collar    O2 Concentration (%): 28      02-10-23 @ 07:01  -  02-11-23 @ 07:00  --------------------------------------------------------  IN: 1080 mL / OUT: 0 mL / NET: 1080 mL              LABS:                        10.5   10.88 )-----------( 284      ( 10 Feb 2023 08:35 )             33.2     02-10    142  |  104  |  30<H>  ----------------------------<  185<H>  4.2   |  32<H>  |  0.86    Ca    8.5      10 Feb 2023 08:35  Phos  3.3     02-10  Mg     2.4     02-10    TPro  5.8<L>  /  Alb  2.0<L>  /  TBili  0.2  /  DBili  0.1  /  AST  14<L>  /  ALT  14  /  AlkPhos  74  02-10              WBC:  WBC Count: 10.88 K/uL (02-10 @ 08:35)  WBC Count: 10.00 K/uL (02-08 @ 05:40)      MICROBIOLOGY:  RECENT CULTURES:                  Sodium:  Sodium, Serum: 142 mmol/L (02-10 @ 08:35)  Sodium, Serum: 138 mmol/L (02-08 @ 05:40)      0.86 mg/dL 02-10 @ 08:35  0.78 mg/dL 02-08 @ 05:40      Hemoglobin:  Hemoglobin: 10.5 g/dL (02-10 @ 08:35)  Hemoglobin: 10.5 g/dL (02-08 @ 05:40)      Platelets: Platelet Count - Automated: 284 K/uL (02-10 @ 08:35)  Platelet Count - Automated: 247 K/uL (02-08 @ 05:40)      LIVER FUNCTIONS - ( 10 Feb 2023 08:35 )  Alb: 2.0 g/dL / Pro: 5.8 gm/dL / ALK PHOS: 74 U/L / ALT: 14 U/L / AST: 14 U/L / GGT: x                 RADIOLOGY & ADDITIONAL STUDIES:      MICROBIOLOGY:  RECENT CULTURES:

## 2023-02-11 NOTE — PROGRESS NOTE ADULT - ASSESSMENT
REVIEW OF SYMPTOMS      Able to give (reliable) ROS  NO     PHYSICAL EXAM    HEENT Unremarkable  atraumatic   RESP Fair air entry EXP prolonged    Harsh breath sound Resp distres mild   CARDIAC S1 S2 No S3     NO JVD    ABDOMEN SOFT BS PRESENT NOT DISTENDED No hepatosplenomegaly   PEDAL EDEMA present No calf tenderness  NO rash       GENERAL DATA .   GO.  12/11/2022 full code       ALLGY.  nka                            WT. ..  12/2/2022 86  BMI. ..    12/2/2022 29                          ICU STAY.  .. 11/29-12/9  COVID.   .. 12/2/2022 scv2 (-)   .. 11/20/2022 scv2 (-)   BEST PRACTICE ISSUES.    HOB ELEVATN. Yes  DVT PPLX.    .. 1/3/2023 apixa 5.2 (vte)    (DVT 12/12 l Subclav throm)  ALEJO PPLX.   INFN PPLX.   .. 11/14 chlorhex 2%   SP SW ANTWAN.         DIET.    ..  12/15 glucerna 1.2 1440 gt   IV fl.    PROCEDURES.  .. 1/28  size 4 fenestrated cuffed trach placed by surgery   .. 12/19 debridement of sacral wound   .. 12/12 r arm midline   .. 12/5/2022 trach    PATIENT PRESENTATION.  72 m doa 11/14/2022 cac    PMH  PMH CAD s/p PCI with stent 2015 and CABG 2014,   PMH  s/p medtronic PPM,   PMH CVA (lacunar infarct)    HOSPITAL COURSE   .. 1) PEA arrest with ROSC after approximately 5 min 11/14  Now communicative   .. 2) DVT 12/12 l Subclav thromS 12/15/2022 lvnx 80.2 1/3 changed to apixaban 5.2  .. 3) TRACH 12/5/2022 trach  .. 4) PEG12/5/2022 peg   .. 5) Cellulitis hand absc 12/13 mod enterococcus fecalis  staph epi 12/12-12/15/2022  cephalexin 12/15 vanco once  12/16-12/19 zosyn  .. 6) Vent weaning       PROBLEM ASSESSMENT RECOMMENDATIONS.  Trach 12/5   .. trach care   Trach change  ..  1/28 size 4 fenestrated cuffed trach  Trach wean.  .. 2/6/2023 pt still has lot of secretions  VTE  .. On apixaba  INFECTION.  .. w 1/17-1/18-1/20-1/21-2/3-2/8-2/10/2023      w 8.3- 7.9 - 6.8 - 7.2 - 8.8  - 10- 10.8  .. pr 1/17-1/20/2023 (-)  (-)   .. 1/16-1/20/2023 Rocephin started by hospitalist dced   CAD.  .. 12/13 metoprolol 25.2   CHF   .. Cr 2/6/2023 Cr .7   .. 11/14/2022 echo mod decr segmental lvsf apical lateral apiccal ant segment are abn takotsubo cmpthy pasp 42 .  .. Monitor for chf has chr hfref per echo   COPD   .. 2/1 duoneb p   .. 12/30 symbicort 160   .. 1/7/2023 glycopyrrolate 1.2   .. 1/9/2023 ipratropium  .. 1/15/2023 scopolamine   .. continue bd ics for copd   Anemia.  .. Hb 1/12-1/14-1/19-1/20-7/21-1/25-2/3-2/8-2/10/2023       Hb 9.8- 10 -9.1- 9.8  - 9.2 - 9.5 -10- 10 - 10.5   .. target hb 7 (+)  .. monitor   sp cac   .. good neuro recovery awake alert interactive   VTE  .. 1/3/2023 apixa 5.2 (vte)      OVERALL   WEANED OFF VENT 1/23   TRACH WEANING  Will start capping when secretions decrease  FLAILING OF LOWER EXTR   .. Jersks started 1/16   .. 1/19/2023 myoclonic jerks improvd on depakote       TIME SPENT   Over 25 minutes aggregate care time spent on encounter; activities included   direct patient care, counseling and/or coordinating care reviewing notes, lab data/ imaging , discussion with multidisciplinary team/ patient  /family and explaining in detail risks, benefits, alternatives  of the recommendations     BRANDON CAMARILLO

## 2023-02-11 NOTE — PROGRESS NOTE ADULT - ASSESSMENT
71 yo male w/ pmhx of COPD, CAD sp PCI w/ stent, CABG 2014, HF w/ PeF, 2nd degree heart block, sp PPM, HTN, DM, CKD Stage 3, CVA- lacunar infarcts admitted to ICU originally for cardiac arrest. ACLS for PEA arrest and ROSC returned after 5 minutes. Cardiac arrest possibly secondary to severe hypoglycemia from eating less and not tracking blood sugar carefully. Hospital course complicated by 1) prolonged hypoxemic respiratory failure. sp trach and peg 2) left upper extremity DVT and placed on eliquis 3) multiple infections (enteroccoal facelis cellutis, infected left hand hematoma, tracheobronchitis secondary to citrobacr 4) tonic clonic seizure - on keppra/depakoate- currently undergoing reevaluation by NEUROLOGY 5) fever of 102 on 1/19. INFECTIOUS DISEASE reconsulted and pan culture ordered     # s/p Cardiac Arrest, acute metabolic encephalopathy  - s/p ACLS w/ ROSC after 5 min   - 2nd degree heart block, s/p PPM   - ECHO Takotsubo cardiomyopathy. EF 50-55%, LVH, mild pHTN    - s/p ICU course; Hemodynamically stable now  - cont w/ Metoprolol, ASA and atorvastatin     # Acute/Chronic Respiratory failure w/ Hypoxia and Hypercapnia/ COPD   - s/p intubation; failed extubation   - Pulmonary on board   - tolerating Trach collar, vent discontinued; continue weaning as per Pulmonary   - cont w/ Symbicort, Duonebs prn  - Surgery on board, s/p Trach change to #4 fenestrated tracheostomy placed 1/30, f/u Capping trial     # Multiple infectious Disease   - trachobronchitis secondary to citrobacter?? - resolved  - enterococcal faecalis cellulitis - resolved   - infected left hand hematoma - s/p bedside debridement 12/13 w/ hand surgery   - monitor off abx     # Occlusive Left subclavian thrombosis   - LUE venous duplex : occlusive thrombus in the left mid subclavian vein with partial slow flow detected in the lateral subclavian vein.  - LUE arterial Duplex neg   - cont w/ Eliquis     Myoclonic Seizure   - EEG: Myoclonic seizure  - Neurology on board   - cont w/ Keppra Depakote    # HTN   - cont w/ Metoprolol     # DM2   - A1c 8.8%  - cont w/ Lantus   - cont w/ FS monitoring w/ insulin s/s coverage     # Cad s/p PCI w/ stent/ CABG  - cont with ASA and Atorvastatin     # Anemia of Chronic Disease  - h/h stable, will cont  to monitor     # Functional quadriplegic   - RITA on discharge   - supportive care    # Stage 4 decubiti sacrum wound  - s/p debridement 12/19  -cont w/ wound care as recommended     # Dysphagia/ Moderate Protein Calorie Malnutrition  - Peg tube in place   - continue with tube feeding at goal as tolerated.     # Constipation  - cont w/ senna, Miralax prn     Dvt ppx: Eliquis   Dipso: Patient has no insurance and Family in process of getting guardianship. Patient will then need placement.     Plan discussed with son at bedside          71 yo male w/ pmhx of COPD, CAD sp PCI w/ stent, CABG 2014, HF w/ PeF, 2nd degree heart block, sp PPM, HTN, DM, CKD Stage 3, CVA- lacunar infarcts admitted to ICU originally for cardiac arrest. ACLS for PEA arrest and ROSC returned after 5 minutes. Cardiac arrest possibly secondary to severe hypoglycemia from eating less and not tracking blood sugar carefully. Hospital course complicated by 1) prolonged hypoxemic respiratory failure. sp trach and peg 2) left upper extremity DVT and placed on eliquis 3) multiple infections (enteroccoal facelis cellutis, infected left hand hematoma, tracheobronchitis secondary to citrobacr 4) tonic clonic seizure - on keppra/depakoate- currently undergoing reevaluation by NEUROLOGY 5) fever of 102 on 1/19. INFECTIOUS DISEASE reconsulted and pan culture ordered     # s/p Cardiac Arrest, acute metabolic encephalopathy  - s/p ACLS w/ ROSC after 5 min   - 2nd degree heart block, s/p PPM   - ECHO Takotsubo cardiomyopathy. EF 50-55%, LVH, mild pHTN    - s/p ICU course; Hemodynamically stable now  - cont w/ Metoprolol, ASA and atorvastatin     # Acute/Chronic Respiratory failure w/ Hypoxia and Hypercapnia/ COPD   - s/p intubation; failed extubation   - Pulmonary on board   - tolerating Trach collar, vent discontinued; continue weaning as per Pulmonary   - cont w/ Symbicort, Duonebs prn  - Surgery on board, s/p Trach change to #4 fenestrated tracheostomy placed 1/30, f/u Capping trial     # Multiple infectious Disease   - trachobronchitis secondary to citrobacter?? - resolved  - enterococcal faecalis cellulitis - resolved   - infected left hand hematoma - s/p bedside debridement 12/13 w/ hand surgery   - monitor off abx     # Occlusive Left subclavian thrombosis   - LUE venous duplex : occlusive thrombus in the left mid subclavian vein with partial slow flow detected in the lateral subclavian vein.  - LUE arterial Duplex neg   - cont w/ Eliquis     Myoclonic Seizure   - EEG: Myoclonic seizure  - Neurology on board   - cont w/ Keppra Depakote    # HTN   - cont w/ Metoprolol     # DM2   - A1c 8.8%  - cont w/ Lantus   - cont w/ FS monitoring w/ insulin s/s coverage     # Cad s/p PCI w/ stent/ CABG  - cont with ASA and Atorvastatin     # Anemia of Chronic Disease  - h/h stable, will cont  to monitor     # Functional quadriplegic   - RITA on discharge   - supportive care    # Stage 4 decubiti sacrum wound  - s/p debridement 12/19  -cont w/ wound care as recommended     # Dysphagia/ Moderate Protein Calorie Malnutrition  - Peg tube in place   - continue with tube feeding at goal as tolerated.     # Constipation  - cont w/ senna, Miralax prn     Dvt ppx: Eliquis   Dipso: Patient has no insurance and Family in process of getting guardianship. Patient will then need placement.     Plan discussed with son at bedside          71 yo male w/ pmhx of COPD, CAD sp PCI w/ stent, CABG 2014, HF w/ PeF, 2nd degree heart block, sp PPM, HTN, DM, CKD Stage 3, CVA- lacunar infarcts admitted to ICU originally for cardiac arrest. ACLS for PEA arrest and ROSC returned after 5 minutes. Cardiac arrest possibly secondary to severe hypoglycemia from eating less and not tracking blood sugar carefully. Hospital course complicated by 1) prolonged hypoxemic respiratory failure. sp trach and peg 2) left upper extremity DVT and placed on eliquis 3) multiple infections (enteroccoal facelis cellutis, infected left hand hematoma, tracheobronchitis secondary to citrobacr 4) tonic clonic seizure - on keppra/depakoate- currently undergoing reevaluation by NEUROLOGY 5) fever of 102 on 1/19. INFECTIOUS DISEASE reconsulted and pan culture ordered     # s/p Cardiac Arrest, acute metabolic encephalopathy  - s/p ACLS w/ ROSC after 5 min   - 2nd degree heart block, s/p PPM   - ECHO Takotsubo cardiomyopathy. EF 50-55%, LVH, mild pHTN    - s/p ICU course; Hemodynamically stable now  - cont w/ Metoprolol, ASA and atorvastatin     # Acute/Chronic Respiratory failure w/ Hypoxia and Hypercapnia/ COPD   - s/p intubation; failed extubation   - Pulmonary on board   - tolerating Trach collar, vent discontinued; continue weaning as per Pulmonary   - cont w/ Symbicort, Duonebs prn  - Surgery on board, s/p Trach change to #4 fenestrated tracheostomy placed 1/30, f/u Capping trial per pulmonary.      # Multiple infectious Disease   - trachobronchitis secondary to citrobacter?? - resolved  - enterococcal faecalis cellulitis - resolved   - infected left hand hematoma - s/p bedside debridement 12/13 w/ hand surgery   - monitor off abx     # Occlusive Left subclavian thrombosis   - LUE venous duplex : occlusive thrombus in the left mid subclavian vein with partial slow flow detected in the lateral subclavian vein.  - LUE arterial Duplex neg   - cont w/ Eliquis     Myoclonic Seizure   - EEG: Myoclonic seizure  - Neurology on board   - cont w/ Keppra Depakote    # HTN   - cont w/ Metoprolol     # DM2   - A1c 8.8%  - cont w/ Lantus   - cont w/ FS monitoring w/ insulin s/s coverage     # Cad s/p PCI w/ stent/ CABG  - cont with ASA and Atorvastatin     # Anemia of Chronic Disease  - h/h stable, will cont  to monitor     # Functional quadriplegic   - RITA on discharge   - supportive care    # Stage 4 decubiti sacrum wound  - s/p debridement 12/19  -cont w/ wound care as recommended     # Dysphagia/ Moderate Protein Calorie Malnutrition  - Peg tube in place   - continue with tube feeding at goal as tolerated.     # Constipation  - cont w/ senna, Miralax prn     Dvt ppx: Eliquis   Dipso: Patient has no insurance and Family in process of getting guardianship. Patient will then need placement.     Plan discussed with son at bedside

## 2023-02-11 NOTE — PROGRESS NOTE ADULT - SUBJECTIVE AND OBJECTIVE BOX
INTERVAL HPI/OVERNIGHT EVENTS:  Pt seen and examined at bedside.     Allergies/Intolerance: No Known Allergies      MEDICATIONS  (STANDING):  apixaban 5 milliGRAM(s) Oral every 12 hours  aspirin  chewable 81 milliGRAM(s) Oral daily  atorvastatin 20 milliGRAM(s) Oral at bedtime  bacitracin   Ointment 1 Application(s) Topical two times a day  bacitracin   Ointment 1 Application(s) Topical two times a day  budesonide 160 MICROgram(s)/formoterol 4.5 MICROgram(s) Inhaler 2 Puff(s) Inhalation two times a day  chlorhexidine 2% Cloths 1 Application(s) Topical <User Schedule>  collagenase Ointment 1 Application(s) Topical two times a day  cyanocobalamin 1000 MICROGram(s) Oral daily  dextrose 5%. 1000 milliLiter(s) (50 mL/Hr) IV Continuous <Continuous>  dextrose 5%. 1000 milliLiter(s) (100 mL/Hr) IV Continuous <Continuous>  dextrose 50% Injectable 25 Gram(s) IV Push once  dextrose 50% Injectable 12.5 Gram(s) IV Push once  dextrose 50% Injectable 25 Gram(s) IV Push once  diphenhydrAMINE Injectable 50 milliGRAM(s) IV Push once  folic acid 1 milliGRAM(s) Oral daily  glucagon  Injectable 1 milliGRAM(s) IntraMuscular once  glycopyrrolate 1 milliGRAM(s) Oral two times a day  insulin glargine Injectable (LANTUS) 10 Unit(s) SubCutaneous at bedtime  insulin lispro (ADMELOG) corrective regimen sliding scale   SubCutaneous every 6 hours  levETIRAcetam 1500 milliGRAM(s) Oral two times a day  metoprolol tartrate 25 milliGRAM(s) Enteral Tube two times a day  polyethylene glycol 3350 17 Gram(s) Oral daily  scopolamine 1 mG/72 Hr(s) Patch 1 Patch Transdermal every 72 hours  senna 2 Tablet(s) Oral at bedtime  silver sulfADIAZINE 1% Cream 1 Application(s) Topical two times a day  valproic  acid Syrup 750 milliGRAM(s) Oral <User Schedule>  vitamin A &amp; D Ointment 1 Application(s) Topical two times a day    MEDICATIONS  (PRN):  acetaminophen     Tablet .. 650 milliGRAM(s) Oral every 6 hours PRN Temp greater or equal to 38C (100.4F), Mild Pain (1 - 3)  albuterol/ipratropium for Nebulization 3 milliLiter(s) Nebulizer every 6 hours PRN Shortness of Breath and/or Wheezing  aluminum hydroxide/magnesium hydroxide/simethicone Suspension 30 milliLiter(s) Oral every 4 hours PRN Dyspepsia  dextrose Oral Gel 15 Gram(s) Oral once PRN Blood Glucose LESS THAN 70 milliGRAM(s)/deciliter        ROS: all systems reviewed and wnl      PHYSICAL EXAMINATION:  Vital Signs Last 24 Hrs  T(C): 36.6 (11 Feb 2023 09:52), Max: 37.4 (10 Feb 2023 23:49)  T(F): 97.8 (11 Feb 2023 09:52), Max: 99.3 (10 Feb 2023 23:49)  HR: 74 (11 Feb 2023 09:52) (63 - 77)  BP: 133/75 (11 Feb 2023 09:52) (104/68 - 133/75)  BP(mean): 80 (10 Feb 2023 16:30) (80 - 80)  RR: 18 (11 Feb 2023 09:52) (18 - 20)  SpO2: 97% (11 Feb 2023 09:52) (96% - 100%)    Parameters below as of 11 Feb 2023 08:59  Patient On (Oxygen Delivery Method): tracheostomy collar      CAPILLARY BLOOD GLUCOSE      POCT Blood Glucose.: 236 mg/dL (11 Feb 2023 05:39)  POCT Blood Glucose.: 137 mg/dL (11 Feb 2023 01:17)  POCT Blood Glucose.: 162 mg/dL (10 Feb 2023 21:11)  POCT Blood Glucose.: 172 mg/dL (10 Feb 2023 17:10)  POCT Blood Glucose.: 194 mg/dL (10 Feb 2023 11:18)      02-10 @ 07:01  -  02-11 @ 07:00  --------------------------------------------------------  IN: 1080 mL / OUT: 0 mL / NET: 1080 mL        GENERAL:   NECK: supple, No JVD  CHEST/LUNG: clear to auscultation bilaterally; no rales, rhonchi, or wheezing b/l  HEART: normal S1, S2  ABDOMEN: BS+, soft, ND, NT   EXTREMITIES:  pulses palpable; no clubbing, cyanosis, or edema b/l LEs  SKIN: no rashes or lesions      LABS:                        10.5   10.88 )-----------( 284      ( 10 Feb 2023 08:35 )             33.2     02-10    142  |  104  |  30<H>  ----------------------------<  185<H>  4.2   |  32<H>  |  0.86    Ca    8.5      10 Feb 2023 08:35  Phos  3.3     02-10  Mg     2.4     02-10    TPro  5.8<L>  /  Alb  2.0<L>  /  TBili  0.2  /  DBili  0.1  /  AST  14<L>  /  ALT  14  /  AlkPhos  74  02-10               INTERVAL HPI/OVERNIGHT EVENTS:  Pt seen and examined at bedside.     Allergies/Intolerance: No Known Allergies      MEDICATIONS  (STANDING):  apixaban 5 milliGRAM(s) Oral every 12 hours  aspirin  chewable 81 milliGRAM(s) Oral daily  atorvastatin 20 milliGRAM(s) Oral at bedtime  bacitracin   Ointment 1 Application(s) Topical two times a day  bacitracin   Ointment 1 Application(s) Topical two times a day  budesonide 160 MICROgram(s)/formoterol 4.5 MICROgram(s) Inhaler 2 Puff(s) Inhalation two times a day  chlorhexidine 2% Cloths 1 Application(s) Topical <User Schedule>  collagenase Ointment 1 Application(s) Topical two times a day  cyanocobalamin 1000 MICROGram(s) Oral daily  dextrose 5%. 1000 milliLiter(s) (50 mL/Hr) IV Continuous <Continuous>  dextrose 5%. 1000 milliLiter(s) (100 mL/Hr) IV Continuous <Continuous>  dextrose 50% Injectable 25 Gram(s) IV Push once  dextrose 50% Injectable 12.5 Gram(s) IV Push once  dextrose 50% Injectable 25 Gram(s) IV Push once  diphenhydrAMINE Injectable 50 milliGRAM(s) IV Push once  folic acid 1 milliGRAM(s) Oral daily  glucagon  Injectable 1 milliGRAM(s) IntraMuscular once  glycopyrrolate 1 milliGRAM(s) Oral two times a day  insulin glargine Injectable (LANTUS) 10 Unit(s) SubCutaneous at bedtime  insulin lispro (ADMELOG) corrective regimen sliding scale   SubCutaneous every 6 hours  levETIRAcetam 1500 milliGRAM(s) Oral two times a day  metoprolol tartrate 25 milliGRAM(s) Enteral Tube two times a day  polyethylene glycol 3350 17 Gram(s) Oral daily  scopolamine 1 mG/72 Hr(s) Patch 1 Patch Transdermal every 72 hours  senna 2 Tablet(s) Oral at bedtime  silver sulfADIAZINE 1% Cream 1 Application(s) Topical two times a day  valproic  acid Syrup 750 milliGRAM(s) Oral <User Schedule>  vitamin A &amp; D Ointment 1 Application(s) Topical two times a day    MEDICATIONS  (PRN):  acetaminophen     Tablet .. 650 milliGRAM(s) Oral every 6 hours PRN Temp greater or equal to 38C (100.4F), Mild Pain (1 - 3)  albuterol/ipratropium for Nebulization 3 milliLiter(s) Nebulizer every 6 hours PRN Shortness of Breath and/or Wheezing  aluminum hydroxide/magnesium hydroxide/simethicone Suspension 30 milliLiter(s) Oral every 4 hours PRN Dyspepsia  dextrose Oral Gel 15 Gram(s) Oral once PRN Blood Glucose LESS THAN 70 milliGRAM(s)/deciliter        ROS: all systems reviewed and wnl      PHYSICAL EXAMINATION:  Vital Signs Last 24 Hrs  T(C): 36.6 (11 Feb 2023 09:52), Max: 37.4 (10 Feb 2023 23:49)  T(F): 97.8 (11 Feb 2023 09:52), Max: 99.3 (10 Feb 2023 23:49)  HR: 74 (11 Feb 2023 09:52) (63 - 77)  BP: 133/75 (11 Feb 2023 09:52) (104/68 - 133/75)  BP(mean): 80 (10 Feb 2023 16:30) (80 - 80)  RR: 18 (11 Feb 2023 09:52) (18 - 20)  SpO2: 97% (11 Feb 2023 09:52) (96% - 100%)    Parameters below as of 11 Feb 2023 08:59  Patient On (Oxygen Delivery Method): tracheostomy collar      CAPILLARY BLOOD GLUCOSE      POCT Blood Glucose.: 236 mg/dL (11 Feb 2023 05:39)  POCT Blood Glucose.: 137 mg/dL (11 Feb 2023 01:17)  POCT Blood Glucose.: 162 mg/dL (10 Feb 2023 21:11)  POCT Blood Glucose.: 172 mg/dL (10 Feb 2023 17:10)  POCT Blood Glucose.: 194 mg/dL (10 Feb 2023 11:18)      02-10 @ 07:01  -  02-11 @ 07:00  --------------------------------------------------------  IN: 1080 mL / OUT: 0 mL / NET: 1080 mL        GENERAL: stable, no fevers on trachcollar  NECK: supple, No JVD  CHEST/LUNG: clear to auscultation bilaterally; no rales, rhonchi, or wheezing b/l  HEART: normal S1, S2  ABDOMEN: BS+, soft, ND, NT   EXTREMITIES:  pulses palpable; no clubbing, cyanosis, or edema b/l LEs    LABS:                        10.5   10.88 )-----------( 284      ( 10 Feb 2023 08:35 )             33.2     02-10    142  |  104  |  30<H>  ----------------------------<  185<H>  4.2   |  32<H>  |  0.86    Ca    8.5      10 Feb 2023 08:35  Phos  3.3     02-10  Mg     2.4     02-10    TPro  5.8<L>  /  Alb  2.0<L>  /  TBili  0.2  /  DBili  0.1  /  AST  14<L>  /  ALT  14  /  AlkPhos  74  02-10

## 2023-02-12 LAB
GLUCOSE BLDC GLUCOMTR-MCNC: 137 MG/DL — HIGH (ref 70–99)
GLUCOSE BLDC GLUCOMTR-MCNC: 170 MG/DL — HIGH (ref 70–99)
GLUCOSE BLDC GLUCOMTR-MCNC: 171 MG/DL — HIGH (ref 70–99)
GLUCOSE BLDC GLUCOMTR-MCNC: 178 MG/DL — HIGH (ref 70–99)
GLUCOSE BLDC GLUCOMTR-MCNC: 187 MG/DL — HIGH (ref 70–99)

## 2023-02-12 PROCEDURE — 99232 SBSQ HOSP IP/OBS MODERATE 35: CPT

## 2023-02-12 RX ADMIN — ROBINUL 1 MILLIGRAM(S): 0.2 INJECTION INTRAMUSCULAR; INTRAVENOUS at 06:01

## 2023-02-12 RX ADMIN — Medication 750 MILLIGRAM(S): at 10:09

## 2023-02-12 RX ADMIN — ATORVASTATIN CALCIUM 20 MILLIGRAM(S): 80 TABLET, FILM COATED ORAL at 23:14

## 2023-02-12 RX ADMIN — Medication 1 MILLIGRAM(S): at 12:06

## 2023-02-12 RX ADMIN — Medication 750 MILLIGRAM(S): at 23:13

## 2023-02-12 RX ADMIN — APIXABAN 5 MILLIGRAM(S): 2.5 TABLET, FILM COATED ORAL at 17:53

## 2023-02-12 RX ADMIN — Medication 1 APPLICATION(S): at 17:51

## 2023-02-12 RX ADMIN — LEVETIRACETAM 1500 MILLIGRAM(S): 250 TABLET, FILM COATED ORAL at 17:53

## 2023-02-12 RX ADMIN — Medication 1 APPLICATION(S): at 06:36

## 2023-02-12 RX ADMIN — ROBINUL 1 MILLIGRAM(S): 0.2 INJECTION INTRAMUSCULAR; INTRAVENOUS at 17:53

## 2023-02-12 RX ADMIN — Medication 1 APPLICATION(S): at 17:48

## 2023-02-12 RX ADMIN — POLYETHYLENE GLYCOL 3350 17 GRAM(S): 17 POWDER, FOR SOLUTION ORAL at 12:06

## 2023-02-12 RX ADMIN — BUDESONIDE AND FORMOTEROL FUMARATE DIHYDRATE 2 PUFF(S): 160; 4.5 AEROSOL RESPIRATORY (INHALATION) at 06:29

## 2023-02-12 RX ADMIN — PREGABALIN 1000 MICROGRAM(S): 225 CAPSULE ORAL at 12:07

## 2023-02-12 RX ADMIN — Medication 1 APPLICATION(S): at 06:35

## 2023-02-12 RX ADMIN — Medication 25 MILLIGRAM(S): at 06:37

## 2023-02-12 RX ADMIN — SCOPALAMINE 1 PATCH: 1 PATCH, EXTENDED RELEASE TRANSDERMAL at 08:16

## 2023-02-12 RX ADMIN — Medication 25 MILLIGRAM(S): at 17:54

## 2023-02-12 RX ADMIN — LEVETIRACETAM 1500 MILLIGRAM(S): 250 TABLET, FILM COATED ORAL at 06:37

## 2023-02-12 RX ADMIN — Medication 81 MILLIGRAM(S): at 12:06

## 2023-02-12 RX ADMIN — APIXABAN 5 MILLIGRAM(S): 2.5 TABLET, FILM COATED ORAL at 06:01

## 2023-02-12 RX ADMIN — Medication 1: at 23:15

## 2023-02-12 RX ADMIN — Medication 1: at 12:09

## 2023-02-12 RX ADMIN — INSULIN GLARGINE 10 UNIT(S): 100 INJECTION, SOLUTION SUBCUTANEOUS at 21:56

## 2023-02-12 RX ADMIN — Medication 1 APPLICATION(S): at 17:52

## 2023-02-12 RX ADMIN — CHLORHEXIDINE GLUCONATE 1 APPLICATION(S): 213 SOLUTION TOPICAL at 06:35

## 2023-02-12 RX ADMIN — Medication 1: at 06:00

## 2023-02-12 RX ADMIN — BUDESONIDE AND FORMOTEROL FUMARATE DIHYDRATE 2 PUFF(S): 160; 4.5 AEROSOL RESPIRATORY (INHALATION) at 17:30

## 2023-02-12 RX ADMIN — SCOPALAMINE 1 PATCH: 1 PATCH, EXTENDED RELEASE TRANSDERMAL at 22:58

## 2023-02-12 NOTE — PROGRESS NOTE ADULT - SUBJECTIVE AND OBJECTIVE BOX
BRANDON MÉNDEZL    [facility] [address]    Allergies    No Known Allergies    Intolerances        PAST MEDICAL & SURGICAL HISTORY:  HTN (hypertension)      HLD (hyperlipidemia)      Diabetes mellitus      Lacunar infarction      BPH (benign prostatic hyperplasia)      CHF (congestive heart failure)      Chronic obstructive pulmonary disease, unspecified COPD type      S/P CABG (coronary artery bypass graft)  2014          FAMILY HISTORY:      Home Medications:  aspirin 81 mg oral delayed release tablet: 1 tab(s) orally once a day (12 Jun 2020 17:35)  Liquifilm Tears preserved ophthalmic solution: 1 drop(s) to each affected eye 2 times a day (12 Jun 2020 17:35)      MEDICATIONS  (STANDING):  apixaban 5 milliGRAM(s) Oral every 12 hours  aspirin  chewable 81 milliGRAM(s) Oral daily  atorvastatin 20 milliGRAM(s) Oral at bedtime  bacitracin   Ointment 1 Application(s) Topical two times a day  bacitracin   Ointment 1 Application(s) Topical two times a day  budesonide 160 MICROgram(s)/formoterol 4.5 MICROgram(s) Inhaler 2 Puff(s) Inhalation two times a day  chlorhexidine 2% Cloths 1 Application(s) Topical <User Schedule>  collagenase Ointment 1 Application(s) Topical two times a day  cyanocobalamin 1000 MICROGram(s) Oral daily  dextrose 5%. 1000 milliLiter(s) (100 mL/Hr) IV Continuous <Continuous>  dextrose 5%. 1000 milliLiter(s) (50 mL/Hr) IV Continuous <Continuous>  dextrose 50% Injectable 25 Gram(s) IV Push once  dextrose 50% Injectable 12.5 Gram(s) IV Push once  dextrose 50% Injectable 25 Gram(s) IV Push once  diphenhydrAMINE Injectable 50 milliGRAM(s) IV Push once  folic acid 1 milliGRAM(s) Oral daily  glucagon  Injectable 1 milliGRAM(s) IntraMuscular once  glycopyrrolate 1 milliGRAM(s) Oral two times a day  insulin glargine Injectable (LANTUS) 10 Unit(s) SubCutaneous at bedtime  insulin lispro (ADMELOG) corrective regimen sliding scale   SubCutaneous every 6 hours  levETIRAcetam 1500 milliGRAM(s) Oral two times a day  metoprolol tartrate 25 milliGRAM(s) Enteral Tube two times a day  polyethylene glycol 3350 17 Gram(s) Oral daily  scopolamine 1 mG/72 Hr(s) Patch 1 Patch Transdermal every 72 hours  senna 2 Tablet(s) Oral at bedtime  silver sulfADIAZINE 1% Cream 1 Application(s) Topical two times a day  valproic  acid Syrup 750 milliGRAM(s) Oral <User Schedule>  vitamin A &amp; D Ointment 1 Application(s) Topical two times a day    MEDICATIONS  (PRN):  acetaminophen     Tablet .. 650 milliGRAM(s) Oral every 6 hours PRN Temp greater or equal to 38C (100.4F), Mild Pain (1 - 3)  albuterol/ipratropium for Nebulization 3 milliLiter(s) Nebulizer every 6 hours PRN Shortness of Breath and/or Wheezing  aluminum hydroxide/magnesium hydroxide/simethicone Suspension 30 milliLiter(s) Oral every 4 hours PRN Dyspepsia  dextrose Oral Gel 15 Gram(s) Oral once PRN Blood Glucose LESS THAN 70 milliGRAM(s)/deciliter      Diet, NPO with Tube Feed:   Tube Feeding Modality: Gastrostomy  Glucerna 1.2 Pedrito  Total Volume for 24 Hours (mL): 1440  Continuous  Until Goal Tube Feed Rate (mL per Hour): 60  Tube Feed Duration (in Hours): 24  Tube Feed Start Time: 14:30  Free Water Flush  Free Water Flush Instructions:  30 ml/hr via pump  Liquid Protein Supplement     Qty per Day:  1 (01-09-23 @ 13:45) [Active]          Vital Signs Last 24 Hrs  T(C): 36.4 (12 Feb 2023 04:34), Max: 36.7 (11 Feb 2023 23:24)  T(F): 97.5 (12 Feb 2023 04:34), Max: 98 (11 Feb 2023 23:24)  HR: 71 (12 Feb 2023 04:34) (65 - 85)  BP: 132/70 (12 Feb 2023 04:34) (115/69 - 133/75)  BP(mean): --  RR: 20 (12 Feb 2023 04:34) (18 - 23)  SpO2: 99% (12 Feb 2023 04:34) (97% - 100%)    Parameters below as of 12 Feb 2023 08:02  Patient On (Oxygen Delivery Method): tracheostomy collar                  LABS:                        10.5   10.88 )-----------( 284      ( 10 Feb 2023 08:35 )             33.2     02-10    142  |  104  |  30<H>  ----------------------------<  185<H>  4.2   |  32<H>  |  0.86    Ca    8.5      10 Feb 2023 08:35  Phos  3.3     02-10  Mg     2.4     02-10    TPro  5.8<L>  /  Alb  2.0<L>  /  TBili  0.2  /  DBili  0.1  /  AST  14<L>  /  ALT  14  /  AlkPhos  74  02-10              WBC:  WBC Count: 10.88 K/uL (02-10 @ 08:35)      MICROBIOLOGY:  RECENT CULTURES:                  Sodium:  Sodium, Serum: 142 mmol/L (02-10 @ 08:35)      0.86 mg/dL 02-10 @ 08:35      Hemoglobin:  Hemoglobin: 10.5 g/dL (02-10 @ 08:35)      Platelets: Platelet Count - Automated: 284 K/uL (02-10 @ 08:35)      LIVER FUNCTIONS - ( 10 Feb 2023 08:35 )  Alb: 2.0 g/dL / Pro: 5.8 gm/dL / ALK PHOS: 74 U/L / ALT: 14 U/L / AST: 14 U/L / GGT: x                 RADIOLOGY & ADDITIONAL STUDIES:      MICROBIOLOGY:  RECENT CULTURES:

## 2023-02-12 NOTE — PROGRESS NOTE ADULT - ASSESSMENT
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 ACLS for PEA arrest and ROSC returned after 5 minutes. Cardiac arrest possibly secondary to severe hypoglycemia from eating less and not tracking blood sugar carefully. Hospital course complicated by 1) prolonged hypoxemic respiratory failure. sp trach and peg 2) left upper extremity DVT and placed on eliquis 3) multiple infections (enteroccoal facelis cellutis, infected left hand hematoma, tracheobronchitis secondary to citrobacr 4) tonic clonic seizure - on keppra/depakoate- currently undergoing reevaluation by NEUROLOGY 5) fever of 102 on 1/19. INFECTIOUS DISEASE reconsulted and pan culture ordered     # s/p Cardiac Arrest, acute metabolic encephalopathy  - s/p ACLS w/ ROSC after 5 min   - 2nd degree heart block, s/p PPM   - ECHO Takotsubo cardiomyopathy. EF 50-55%, LVH, mild pHTN    - s/p ICU course; Hemodynamically stable now  - cont w/ Metoprolol, ASA and atorvastatin     # Acute/Chronic Respiratory failure w/ Hypoxia and Hypercapnia/ COPD   - s/p intubation; failed extubation   - Pulmonary on board   - tolerating Trach collar, vent discontinued; continue weaning as per Pulmonary   - cont w/ Symbicort, Duonebs prn  - Surgery on board, s/p Trach change to #4 fenestrated tracheostomy placed 1/30, f/u Capping trial per pulmonary.      # Multiple infectious Disease   - trachobronchitis secondary to citrobacter?? - resolved  - enterococcal faecalis cellulitis - resolved   - infected left hand hematoma - s/p bedside debridement 12/13 w/ hand surgery   - monitor off abx     # Occlusive Left subclavian thrombosis   - LUE venous duplex : occlusive thrombus in the left mid subclavian vein with partial slow flow detected in the lateral subclavian vein.  - LUE arterial Duplex neg   - cont w/ Eliquis     Myoclonic Seizure   - EEG: Myoclonic seizure  - Neurology on board   - cont w/ Keppra Depakote    # HTN   - cont w/ Metoprolol     # DM2   - A1c 8.8%  - cont w/ Lantus   - cont w/ FS monitoring w/ insulin s/s coverage     # Cad s/p PCI w/ stent/ CABG  - cont with ASA and Atorvastatin     # Anemia of Chronic Disease  - h/h stable, will cont  to monitor     # Functional quadriplegic   - RITA on discharge   - supportive care    # Stage 4 decubiti sacrum wound  - s/p debridement 12/19  -cont w/ wound care as recommended     # Dysphagia/ Moderate Protein Calorie Malnutrition  - Peg tube in place   - continue with tube feeding at goal as tolerated.     # Constipation  - cont w/ senna, Miralax prn     Dvt ppx: Eliquis   Dipso: Patient has no insurance and Family in process of getting guardianship. Patient will then need placement.     Plan discussed with son at bedside          71 yo male w/ pmhx of COPD, CAD sp PCI w/ stent, CABG 2014, HF w/ PeF, 2nd degree heart block, sp PPM, HTN, DM, CKD Stage 3, CVA- lacunar infarcts admitted to ICU originally for cardiac arrest. ACLS for PEA arrest and ROSC returned after 5 minutes. Cardiac arrest possibly secondary to severe hypoglycemia from eating less and not tracking blood sugar carefully. Hospital course complicated by 1) prolonged hypoxemic respiratory failure. sp trach and peg 2) left upper extremity DVT and placed on eliquis 3) multiple infections (enteroccoal facelis cellutis, infected left hand hematoma, tracheobronchitis secondary to citrobacr 4) tonic clonic seizure - on keppra/depakoate- currently undergoing reevaluation by NEUROLOGY 5) fever of 102 on 1/19. INFECTIOUS DISEASE reconsulted and pan culture ordered       # s/p Cardiac Arrest, acute metabolic encephalopathy  - s/p ACLS w/ ROSC after 5 min   - 2nd degree heart block, s/p PPM   - ECHO Takotsubo cardiomyopathy. EF 50-55%, LVH, mild pHTN    - s/p ICU course; Hemodynamically stable now  - cont w/ Metoprolol, ASA and atorvastatin     # Acute/Chronic Respiratory failure w/ Hypoxia and Hypercapnia/ COPD   - s/p intubation; failed extubation   - Pulmonary on board   - tolerating Trach collar, vent discontinued; continue weaning as per Pulmonary   - cont w/ Symbicort, Duonebs prn  - Surgery on board, s/p Trach change to #4 fenestrated tracheostomy placed 1/30.     Capping trial per pulmonary this week.       # Multiple infectious Disease   - trachobronchitis secondary to citrobacter?? - resolved  - enterococcal faecalis cellulitis - resolved   - infected left hand hematoma - s/p bedside debridement 12/13 w/ hand surgery   - monitor off abx     # Occlusive Left subclavian thrombosis   - LUE venous duplex : occlusive thrombus in the left mid subclavian vein with partial slow flow detected in the lateral subclavian vein.  - LUE arterial Duplex neg   - cont w/ Eliquis     Myoclonic Seizure   - EEG: Myoclonic seizure  - Neurology on board   - cont w/ Keppra Depakote    # HTN   - cont w/ Metoprolol     # DM2   - A1c 8.8%  - cont w/ Lantus   - cont w/ FS monitoring w/ insulin s/s coverage     # Cad s/p PCI w/ stent/ CABG  - cont with ASA and Atorvastatin     # Anemia of Chronic Disease  - h/h stable, will cont  to monitor     # Functional quadriplegic   - RITA on discharge   - supportive care    # Stage 4 decubiti sacrum wound  - s/p debridement 12/19  -cont w/ wound care as recommended     # Dysphagia/ Moderate Protein Calorie Malnutrition  - Peg tube in place   - continue with tube feeding at goal as tolerated.     # Constipation  - cont w/ senna, Miralax prn     Dvt ppx: Eliquis   Dipso: Patient has no insurance and Family in process of getting guardianship. Patient will then need placement.     Plan discussed with son at bedside

## 2023-02-12 NOTE — PROGRESS NOTE ADULT - ASSESSMENT
REVIEW OF SYMPTOMS      Able to give (reliable) ROS  NO     PHYSICAL EXAM    HEENT Unremarkable  atraumatic   RESP Fair air entry EXP prolonged    Harsh breath sound Resp distres mild   CARDIAC S1 S2 No S3     NO JVD    ABDOMEN SOFT BS PRESENT NOT DISTENDED No hepatosplenomegaly   PEDAL EDEMA present No calf tenderness  NO rash       ABGS.  1/28/2023 afeb 70 100/60   1/28/2023 tc 97%  1/6/2023 ac 16/450/.3/5 746/49/123     VS/ PO/IO/ VENT/ DRIPS.  2/12/2023 70 120/50   2/12/2023 tc 28%     PATIENT PRESENTATION.  72 m doa 11/14/2022 cac    PMH  PMH CAD s/p PCI with stent 2015 and CABG 2014,   PMH  s/p [employer] PPM,   PMH CVA (lacunar infarct)    HOSPITAL COURSE   .. 1) PEA arrest with ROSC after approximately 5 min 11/14  Now communicative   .. 2) DVT 12/12 l Subclav thromS 12/15/2022 lvnx 80.2 1/3 changed to apixaban 5.2  .. 3) TRACH 12/5/2022 trach  .. 4) PEG12/5/2022 peg   .. 5) Cellulitis hand absc 12/13 mod enterococcus fecalis  staph epi 12/12-12/15/2022  cephalexin 12/15 vanco once  12/16-12/19 zosyn  .. 6) Vent weaning       PROBLEM ASSESSMENT RECOMMENDATIONS.  Trach 12/5   .. trach care   Trach change  ..  1/28 size 4 fenestrated cuffed trach  Trach wean.  .. 2/6/2023 pt still has lot of secretions  VTE  .. On apixaba  INFECTION.  .. w 1/17-1/18-1/20-1/21-2/3-2/8-2/10/2023      w 8.3- 7.9 - 6.8 - 7.2 - 8.8  - 10- 10.8  .. pr 1/17-1/20/2023 (-)  (-)   .. 1/16-1/20/2023 Rocephin started by hospitalist dced   CAD.  .. 12/13 metoprolol 25.2   CHF   .. Cr 2/6/2023 Cr .7   .. 11/14/2022 echo mod decr segmental lvsf apical lateral apiccal ant segment are abn takotsubo cmpthy pasp 42 .  .. Monitor for chf has chr hfref per echo   COPD   .. 2/1 duoneb p   .. 12/30 symbicort 160   .. 1/7/2023 glycopyrrolate 1.2   .. 1/9/2023 ipratropium  .. 1/15/2023 scopolamine   .. continue bd ics for copd   Anemia.  .. Hb 1/12-1/14-1/19-1/20-7/21-1/25-2/3-2/8-2/10/2023       Hb 9.8- 10 -9.1- 9.8  - 9.2 - 9.5 -10- 10 - 10.5   .. target hb 7 (+)  .. monitor   sp cac   .. good neuro recovery awake alert interactive   VTE  .. 1/3/2023 apixa 5.2 (vte)    FAMILY COMMUNICATION.  .. 2/12/2023 dw pt son  will ask speech eval for passey cordelia valve  .. 2/12/2023 Pt has a fenestrated trach and has fenestrated inner cannula which is available to place before trying passy cordelia valve     OVERALL   WEANED OFF VENT 1/23   TRACH WEANING  Will start capping when secretions decrease  FLAILING OF LOWER EXTR   .. Jersks started 1/16   .. 1/19/2023 myoclonic jerks improvd on depakote       TIME SPENT   Over 25 minutes aggregate care time spent on encounter; activities included   direct patient care, counseling and/or coordinating care reviewing notes, lab data/ imaging , discussion with multidisciplinary team/ patient  /family and explaining in detail risks, benefits, alternatives  of the recommendations     BRANDON CAMARILLO

## 2023-02-12 NOTE — PROGRESS NOTE ADULT - SUBJECTIVE AND OBJECTIVE BOX
INTERVAL HPI/OVERNIGHT EVENTS:  Pt seen and examined at bedside.     Allergies/Intolerance: No Known Allergies      MEDICATIONS  (STANDING):  apixaban 5 milliGRAM(s) Oral every 12 hours  aspirin  chewable 81 milliGRAM(s) Oral daily  atorvastatin 20 milliGRAM(s) Oral at bedtime  bacitracin   Ointment 1 Application(s) Topical two times a day  bacitracin   Ointment 1 Application(s) Topical two times a day  budesonide 160 MICROgram(s)/formoterol 4.5 MICROgram(s) Inhaler 2 Puff(s) Inhalation two times a day  chlorhexidine 2% Cloths 1 Application(s) Topical <User Schedule>  collagenase Ointment 1 Application(s) Topical two times a day  cyanocobalamin 1000 MICROGram(s) Oral daily  dextrose 5%. 1000 milliLiter(s) (100 mL/Hr) IV Continuous <Continuous>  dextrose 5%. 1000 milliLiter(s) (50 mL/Hr) IV Continuous <Continuous>  dextrose 50% Injectable 25 Gram(s) IV Push once  dextrose 50% Injectable 12.5 Gram(s) IV Push once  dextrose 50% Injectable 25 Gram(s) IV Push once  diphenhydrAMINE Injectable 50 milliGRAM(s) IV Push once  folic acid 1 milliGRAM(s) Oral daily  glucagon  Injectable 1 milliGRAM(s) IntraMuscular once  glycopyrrolate 1 milliGRAM(s) Oral two times a day  insulin glargine Injectable (LANTUS) 10 Unit(s) SubCutaneous at bedtime  insulin lispro (ADMELOG) corrective regimen sliding scale   SubCutaneous every 6 hours  levETIRAcetam 1500 milliGRAM(s) Oral two times a day  metoprolol tartrate 25 milliGRAM(s) Enteral Tube two times a day  polyethylene glycol 3350 17 Gram(s) Oral daily  scopolamine 1 mG/72 Hr(s) Patch 1 Patch Transdermal every 72 hours  senna 2 Tablet(s) Oral at bedtime  silver sulfADIAZINE 1% Cream 1 Application(s) Topical two times a day  valproic  acid Syrup 750 milliGRAM(s) Oral <User Schedule>  vitamin A &amp; D Ointment 1 Application(s) Topical two times a day    MEDICATIONS  (PRN):  acetaminophen     Tablet .. 650 milliGRAM(s) Oral every 6 hours PRN Temp greater or equal to 38C (100.4F), Mild Pain (1 - 3)  albuterol/ipratropium for Nebulization 3 milliLiter(s) Nebulizer every 6 hours PRN Shortness of Breath and/or Wheezing  aluminum hydroxide/magnesium hydroxide/simethicone Suspension 30 milliLiter(s) Oral every 4 hours PRN Dyspepsia  dextrose Oral Gel 15 Gram(s) Oral once PRN Blood Glucose LESS THAN 70 milliGRAM(s)/deciliter        ROS: all systems reviewed and wnl      PHYSICAL EXAMINATION:  Vital Signs Last 24 Hrs  T(C): 36.4 (12 Feb 2023 04:34), Max: 36.7 (11 Feb 2023 23:24)  T(F): 97.5 (12 Feb 2023 04:34), Max: 98 (11 Feb 2023 23:24)  HR: 71 (12 Feb 2023 04:34) (65 - 85)  BP: 132/70 (12 Feb 2023 04:34) (115/69 - 132/70)  BP(mean): --  RR: 20 (12 Feb 2023 04:34) (18 - 23)  SpO2: 99% (12 Feb 2023 09:02) (98% - 100%)    Parameters below as of 12 Feb 2023 09:02  Patient On (Oxygen Delivery Method): tracheostomy collar    O2 Concentration (%): 28  CAPILLARY BLOOD GLUCOSE      POCT Blood Glucose.: 178 mg/dL (12 Feb 2023 05:46)  POCT Blood Glucose.: 289 mg/dL (11 Feb 2023 23:40)  POCT Blood Glucose.: 249 mg/dL (11 Feb 2023 21:06)  POCT Blood Glucose.: 294 mg/dL (11 Feb 2023 17:07)  POCT Blood Glucose.: 191 mg/dL (11 Feb 2023 11:24)        GENERAL:   NECK: supple, No JVD  CHEST/LUNG: clear to auscultation bilaterally; no rales, rhonchi, or wheezing b/l  HEART: normal S1, S2  ABDOMEN: BS+, soft, ND, NT   EXTREMITIES:  pulses palpable; no clubbing, cyanosis, or edema b/l LEs  SKIN: no rashes or lesions      LABS:                     INTERVAL HPI/OVERNIGHT EVENTS:  Pt seen and examined at bedside.     Allergies/Intolerance: No Known Allergies      MEDICATIONS  (STANDING):  apixaban 5 milliGRAM(s) Oral every 12 hours  aspirin  chewable 81 milliGRAM(s) Oral daily  atorvastatin 20 milliGRAM(s) Oral at bedtime  bacitracin   Ointment 1 Application(s) Topical two times a day  bacitracin   Ointment 1 Application(s) Topical two times a day  budesonide 160 MICROgram(s)/formoterol 4.5 MICROgram(s) Inhaler 2 Puff(s) Inhalation two times a day  chlorhexidine 2% Cloths 1 Application(s) Topical <User Schedule>  collagenase Ointment 1 Application(s) Topical two times a day  cyanocobalamin 1000 MICROGram(s) Oral daily  dextrose 5%. 1000 milliLiter(s) (100 mL/Hr) IV Continuous <Continuous>  dextrose 5%. 1000 milliLiter(s) (50 mL/Hr) IV Continuous <Continuous>  dextrose 50% Injectable 25 Gram(s) IV Push once  dextrose 50% Injectable 12.5 Gram(s) IV Push once  dextrose 50% Injectable 25 Gram(s) IV Push once  diphenhydrAMINE Injectable 50 milliGRAM(s) IV Push once  folic acid 1 milliGRAM(s) Oral daily  glucagon  Injectable 1 milliGRAM(s) IntraMuscular once  glycopyrrolate 1 milliGRAM(s) Oral two times a day  insulin glargine Injectable (LANTUS) 10 Unit(s) SubCutaneous at bedtime  insulin lispro (ADMELOG) corrective regimen sliding scale   SubCutaneous every 6 hours  levETIRAcetam 1500 milliGRAM(s) Oral two times a day  metoprolol tartrate 25 milliGRAM(s) Enteral Tube two times a day  polyethylene glycol 3350 17 Gram(s) Oral daily  scopolamine 1 mG/72 Hr(s) Patch 1 Patch Transdermal every 72 hours  senna 2 Tablet(s) Oral at bedtime  silver sulfADIAZINE 1% Cream 1 Application(s) Topical two times a day  valproic  acid Syrup 750 milliGRAM(s) Oral <User Schedule>  vitamin A &amp; D Ointment 1 Application(s) Topical two times a day    MEDICATIONS  (PRN):  acetaminophen     Tablet .. 650 milliGRAM(s) Oral every 6 hours PRN Temp greater or equal to 38C (100.4F), Mild Pain (1 - 3)  albuterol/ipratropium for Nebulization 3 milliLiter(s) Nebulizer every 6 hours PRN Shortness of Breath and/or Wheezing  aluminum hydroxide/magnesium hydroxide/simethicone Suspension 30 milliLiter(s) Oral every 4 hours PRN Dyspepsia  dextrose Oral Gel 15 Gram(s) Oral once PRN Blood Glucose LESS THAN 70 milliGRAM(s)/deciliter        ROS: all systems reviewed and wnl      PHYSICAL EXAMINATION:  Vital Signs Last 24 Hrs  T(C): 36.4 (12 Feb 2023 04:34), Max: 36.7 (11 Feb 2023 23:24)  T(F): 97.5 (12 Feb 2023 04:34), Max: 98 (11 Feb 2023 23:24)  HR: 71 (12 Feb 2023 04:34) (65 - 85)  BP: 132/70 (12 Feb 2023 04:34) (115/69 - 132/70)  BP(mean): --  RR: 20 (12 Feb 2023 04:34) (18 - 23)  SpO2: 99% (12 Feb 2023 09:02) (98% - 100%)    Parameters below as of 12 Feb 2023 09:02  Patient On (Oxygen Delivery Method): tracheostomy collar    O2 Concentration (%): 28  CAPILLARY BLOOD GLUCOSE      POCT Blood Glucose.: 178 mg/dL (12 Feb 2023 05:46)  POCT Blood Glucose.: 289 mg/dL (11 Feb 2023 23:40)  POCT Blood Glucose.: 249 mg/dL (11 Feb 2023 21:06)  POCT Blood Glucose.: 294 mg/dL (11 Feb 2023 17:07)  POCT Blood Glucose.: 191 mg/dL (11 Feb 2023 11:24)        GENERAL: stable, in bed, son at bedside, more alert today. PEG feeds in place.   NECK: supple, No JVD  CHEST/LUNG: clear to auscultation bilaterally; no rales, rhonchi, or wheezing b/l  HEART: normal S1, S2  ABDOMEN: BS+, soft, ND, NT   EXTREMITIES:  pulses palpable; no clubbing, cyanosis, or edema b/l LEs      LABS:

## 2023-02-13 LAB
GLUCOSE BLDC GLUCOMTR-MCNC: 122 MG/DL — HIGH (ref 70–99)
GLUCOSE BLDC GLUCOMTR-MCNC: 185 MG/DL — HIGH (ref 70–99)
GLUCOSE BLDC GLUCOMTR-MCNC: 202 MG/DL — HIGH (ref 70–99)

## 2023-02-13 PROCEDURE — 99232 SBSQ HOSP IP/OBS MODERATE 35: CPT

## 2023-02-13 RX ADMIN — Medication 1: at 12:44

## 2023-02-13 RX ADMIN — Medication 3 MILLILITER(S): at 23:36

## 2023-02-13 RX ADMIN — Medication 1 APPLICATION(S): at 05:43

## 2023-02-13 RX ADMIN — LEVETIRACETAM 1500 MILLIGRAM(S): 250 TABLET, FILM COATED ORAL at 18:18

## 2023-02-13 RX ADMIN — Medication 1 APPLICATION(S): at 18:20

## 2023-02-13 RX ADMIN — Medication 0: at 18:20

## 2023-02-13 RX ADMIN — Medication 25 MILLIGRAM(S): at 05:47

## 2023-02-13 RX ADMIN — Medication 1 MILLIGRAM(S): at 12:47

## 2023-02-13 RX ADMIN — Medication 25 MILLIGRAM(S): at 18:18

## 2023-02-13 RX ADMIN — APIXABAN 5 MILLIGRAM(S): 2.5 TABLET, FILM COATED ORAL at 05:45

## 2023-02-13 RX ADMIN — BUDESONIDE AND FORMOTEROL FUMARATE DIHYDRATE 2 PUFF(S): 160; 4.5 AEROSOL RESPIRATORY (INHALATION) at 05:44

## 2023-02-13 RX ADMIN — ROBINUL 1 MILLIGRAM(S): 0.2 INJECTION INTRAMUSCULAR; INTRAVENOUS at 05:45

## 2023-02-13 RX ADMIN — Medication 81 MILLIGRAM(S): at 12:43

## 2023-02-13 RX ADMIN — Medication 1 APPLICATION(S): at 18:17

## 2023-02-13 RX ADMIN — SCOPALAMINE 1 PATCH: 1 PATCH, EXTENDED RELEASE TRANSDERMAL at 18:20

## 2023-02-13 RX ADMIN — Medication 750 MILLIGRAM(S): at 18:21

## 2023-02-13 RX ADMIN — POLYETHYLENE GLYCOL 3350 17 GRAM(S): 17 POWDER, FOR SOLUTION ORAL at 12:34

## 2023-02-13 RX ADMIN — Medication 1 APPLICATION(S): at 05:42

## 2023-02-13 RX ADMIN — Medication 1 APPLICATION(S): at 05:45

## 2023-02-13 RX ADMIN — Medication 1: at 05:44

## 2023-02-13 RX ADMIN — SCOPALAMINE 1 PATCH: 1 PATCH, EXTENDED RELEASE TRANSDERMAL at 12:34

## 2023-02-13 RX ADMIN — CHLORHEXIDINE GLUCONATE 1 APPLICATION(S): 213 SOLUTION TOPICAL at 05:46

## 2023-02-13 RX ADMIN — Medication 750 MILLIGRAM(S): at 12:33

## 2023-02-13 RX ADMIN — ATORVASTATIN CALCIUM 20 MILLIGRAM(S): 80 TABLET, FILM COATED ORAL at 21:41

## 2023-02-13 RX ADMIN — ROBINUL 1 MILLIGRAM(S): 0.2 INJECTION INTRAMUSCULAR; INTRAVENOUS at 18:17

## 2023-02-13 RX ADMIN — BUDESONIDE AND FORMOTEROL FUMARATE DIHYDRATE 2 PUFF(S): 160; 4.5 AEROSOL RESPIRATORY (INHALATION) at 17:26

## 2023-02-13 RX ADMIN — PREGABALIN 1000 MICROGRAM(S): 225 CAPSULE ORAL at 12:47

## 2023-02-13 RX ADMIN — LEVETIRACETAM 1500 MILLIGRAM(S): 250 TABLET, FILM COATED ORAL at 05:45

## 2023-02-13 RX ADMIN — INSULIN GLARGINE 10 UNIT(S): 100 INJECTION, SOLUTION SUBCUTANEOUS at 21:41

## 2023-02-13 RX ADMIN — APIXABAN 5 MILLIGRAM(S): 2.5 TABLET, FILM COATED ORAL at 18:19

## 2023-02-13 NOTE — PROGRESS NOTE ADULT - SUBJECTIVE AND OBJECTIVE BOX
BRANDON MÉNDEZL    [facility] [address]    Allergies    No Known Allergies    Intolerances        PAST MEDICAL & SURGICAL HISTORY:  HTN (hypertension)      HLD (hyperlipidemia)      Diabetes mellitus      Lacunar infarction      BPH (benign prostatic hyperplasia)      CHF (congestive heart failure)      Chronic obstructive pulmonary disease, unspecified COPD type      S/P CABG (coronary artery bypass graft)  2014          FAMILY HISTORY:      Home Medications:  aspirin 81 mg oral delayed release tablet: 1 tab(s) orally once a day (12 Jun 2020 17:35)  Liquifilm Tears preserved ophthalmic solution: 1 drop(s) to each affected eye 2 times a day (12 Jun 2020 17:35)      MEDICATIONS  (STANDING):  apixaban 5 milliGRAM(s) Oral every 12 hours  aspirin  chewable 81 milliGRAM(s) Oral daily  atorvastatin 20 milliGRAM(s) Oral at bedtime  bacitracin   Ointment 1 Application(s) Topical two times a day  bacitracin   Ointment 1 Application(s) Topical two times a day  budesonide 160 MICROgram(s)/formoterol 4.5 MICROgram(s) Inhaler 2 Puff(s) Inhalation two times a day  chlorhexidine 2% Cloths 1 Application(s) Topical <User Schedule>  collagenase Ointment 1 Application(s) Topical two times a day  cyanocobalamin 1000 MICROGram(s) Oral daily  dextrose 5%. 1000 milliLiter(s) (50 mL/Hr) IV Continuous <Continuous>  dextrose 5%. 1000 milliLiter(s) (100 mL/Hr) IV Continuous <Continuous>  dextrose 50% Injectable 25 Gram(s) IV Push once  dextrose 50% Injectable 12.5 Gram(s) IV Push once  dextrose 50% Injectable 25 Gram(s) IV Push once  diphenhydrAMINE Injectable 50 milliGRAM(s) IV Push once  folic acid 1 milliGRAM(s) Oral daily  glucagon  Injectable 1 milliGRAM(s) IntraMuscular once  glycopyrrolate 1 milliGRAM(s) Oral two times a day  insulin glargine Injectable (LANTUS) 10 Unit(s) SubCutaneous at bedtime  insulin lispro (ADMELOG) corrective regimen sliding scale   SubCutaneous every 6 hours  levETIRAcetam 1500 milliGRAM(s) Oral two times a day  metoprolol tartrate 25 milliGRAM(s) Enteral Tube two times a day  polyethylene glycol 3350 17 Gram(s) Oral daily  scopolamine 1 mG/72 Hr(s) Patch 1 Patch Transdermal every 72 hours  senna 2 Tablet(s) Oral at bedtime  silver sulfADIAZINE 1% Cream 1 Application(s) Topical two times a day  valproic  acid Syrup 750 milliGRAM(s) Oral <User Schedule>  vitamin A &amp; D Ointment 1 Application(s) Topical two times a day    MEDICATIONS  (PRN):  acetaminophen     Tablet .. 650 milliGRAM(s) Oral every 6 hours PRN Temp greater or equal to 38C (100.4F), Mild Pain (1 - 3)  albuterol/ipratropium for Nebulization 3 milliLiter(s) Nebulizer every 6 hours PRN Shortness of Breath and/or Wheezing  aluminum hydroxide/magnesium hydroxide/simethicone Suspension 30 milliLiter(s) Oral every 4 hours PRN Dyspepsia  dextrose Oral Gel 15 Gram(s) Oral once PRN Blood Glucose LESS THAN 70 milliGRAM(s)/deciliter      Diet, NPO with Tube Feed:   Tube Feeding Modality: Gastrostomy  Glucerna 1.2 Pedrito  Total Volume for 24 Hours (mL): 1440  Continuous  Until Goal Tube Feed Rate (mL per Hour): 60  Tube Feed Duration (in Hours): 24  Tube Feed Start Time: 14:30  Free Water Flush  Free Water Flush Instructions:  30 ml/hr via pump  Liquid Protein Supplement     Qty per Day:  1 (01-09-23 @ 13:45) [Active]          Vital Signs Last 24 Hrs  T(C): 36.8 (13 Feb 2023 04:23), Max: 37.2 (12 Feb 2023 17:33)  T(F): 98.2 (13 Feb 2023 04:23), Max: 98.9 (12 Feb 2023 17:33)  HR: 77 (13 Feb 2023 09:08) (65 - 92)  BP: 137/82 (13 Feb 2023 04:23) (94/55 - 137/82)  BP(mean): --  RR: 18 (13 Feb 2023 09:08) (18 - 24)  SpO2: 100% (13 Feb 2023 09:08) (96% - 100%)    Parameters below as of 13 Feb 2023 09:08  Patient On (Oxygen Delivery Method): tracheostomy collar  O2 Flow (L/min): 5  O2 Concentration (%): 28              LABS:                    WBC:  WBC Count: 10.88 K/uL (02-10 @ 08:35)      MICROBIOLOGY:  RECENT CULTURES:                  Sodium:  Sodium, Serum: 142 mmol/L (02-10 @ 08:35)      0.86 mg/dL 02-10 @ 08:35      Hemoglobin:  Hemoglobin: 10.5 g/dL (02-10 @ 08:35)      Platelets: Platelet Count - Automated: 284 K/uL (02-10 @ 08:35)              RADIOLOGY & ADDITIONAL STUDIES:      MICROBIOLOGY:  RECENT CULTURES:

## 2023-02-13 NOTE — PROGRESS NOTE ADULT - SUBJECTIVE AND OBJECTIVE BOX
Pt seen and examined at bedside.     ROS: all systems reviewed and wnl      PHYSICAL EXAMINATION:  Vital Signs Last 24 Hrs  Vital Signs Last 24 Hrs  T(C): 36.8 (13 Feb 2023 04:23), Max: 37.2 (12 Feb 2023 17:33)  T(F): 98.2 (13 Feb 2023 04:23), Max: 98.9 (12 Feb 2023 17:33)  HR: 77 (13 Feb 2023 09:08) (65 - 92)  BP: 137/82 (13 Feb 2023 04:23) (94/55 - 137/82)  BP(mean): --  RR: 18 (13 Feb 2023 09:08) (18 - 24)  SpO2: 100% (13 Feb 2023 09:08) (96% - 100%)    Parameters below as of 13 Feb 2023 09:08  Patient On (Oxygen Delivery Method): tracheostomy collar  O2 Flow (L/min): 5  O2 Concentration (%): 28        GENERAL: stable, in bed, son at bedside, more alert today. PEG feeds in place.   NECK: supple, No JVD  CHEST/LUNG: clear to auscultation bilaterally; no rales, rhonchi, or wheezing b/l  HEART: normal S1, S2  ABDOMEN: BS+, soft, ND, NT   EXTREMITIES:  pulses palpable; no clubbing, cyanosis, or edema b/l LEs      LABS:        MEDICATIONS  (STANDING):  apixaban 5 milliGRAM(s) Oral every 12 hours  aspirin  chewable 81 milliGRAM(s) Oral daily  atorvastatin 20 milliGRAM(s) Oral at bedtime  bacitracin   Ointment 1 Application(s) Topical two times a day  bacitracin   Ointment 1 Application(s) Topical two times a day  budesonide 160 MICROgram(s)/formoterol 4.5 MICROgram(s) Inhaler 2 Puff(s) Inhalation two times a day  chlorhexidine 2% Cloths 1 Application(s) Topical <User Schedule>  collagenase Ointment 1 Application(s) Topical two times a day  cyanocobalamin 1000 MICROGram(s) Oral daily  dextrose 5%. 1000 milliLiter(s) (100 mL/Hr) IV Continuous <Continuous>  dextrose 5%. 1000 milliLiter(s) (50 mL/Hr) IV Continuous <Continuous>  dextrose 50% Injectable 25 Gram(s) IV Push once  dextrose 50% Injectable 12.5 Gram(s) IV Push once  dextrose 50% Injectable 25 Gram(s) IV Push once  diphenhydrAMINE Injectable 50 milliGRAM(s) IV Push once  folic acid 1 milliGRAM(s) Oral daily  glucagon  Injectable 1 milliGRAM(s) IntraMuscular once  glycopyrrolate 1 milliGRAM(s) Oral two times a day  insulin glargine Injectable (LANTUS) 10 Unit(s) SubCutaneous at bedtime  insulin lispro (ADMELOG) corrective regimen sliding scale   SubCutaneous every 6 hours  levETIRAcetam 1500 milliGRAM(s) Oral two times a day  metoprolol tartrate 25 milliGRAM(s) Enteral Tube two times a day  polyethylene glycol 3350 17 Gram(s) Oral daily  scopolamine 1 mG/72 Hr(s) Patch 1 Patch Transdermal every 72 hours  senna 2 Tablet(s) Oral at bedtime  silver sulfADIAZINE 1% Cream 1 Application(s) Topical two times a day  valproic  acid Syrup 750 milliGRAM(s) Oral <User Schedule>  vitamin A &amp; D Ointment 1 Application(s) Topical two times a day    MEDICATIONS  (PRN):  acetaminophen     Tablet .. 650 milliGRAM(s) Oral every 6 hours PRN Temp greater or equal to 38C (100.4F), Mild Pain (1 - 3)  albuterol/ipratropium for Nebulization 3 milliLiter(s) Nebulizer every 6 hours PRN Shortness of Breath and/or Wheezing  aluminum hydroxide/magnesium hydroxide/simethicone Suspension 30 milliLiter(s) Oral every 4 hours PRN Dyspepsia  dextrose Oral Gel 15 Gram(s) Oral once PRN Blood Glucose LESS THAN 70 milliGRAM(s)/deciliter

## 2023-02-13 NOTE — STUDENT SIGN OFF DOCUMENT - STUDENT DOCUMENT REVIEW
Corinne Lima, Dietetic Intern 
Luis Antonio Reeves 
Sofie Morin, SPT 
Luis Antonio Reeves 
Melia Garcia

## 2023-02-13 NOTE — PROGRESS NOTE ADULT - ASSESSMENT
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 ACLS for PEA arrest and ROSC returned after 5 minutes. Cardiac arrest possibly secondary to severe hypoglycemia from eating less and not tracking blood sugar carefully. Hospital course complicated by 1) prolonged hypoxemic respiratory failure. sp trach and peg 2) left upper extremity DVT and placed on eliquis 3) multiple infections (enteroccoal facelis cellutis, infected left hand hematoma, tracheobronchitis secondary to citrobacr 4) tonic clonic seizure - on keppra/depakoate- currently undergoing reevaluation by NEUROLOGY 5) fever of 102 on 1/19. INFECTIOUS DISEASE reconsulted and pan culture ordered       # s/p Cardiac Arrest, acute metabolic encephalopathy  - s/p ACLS w/ ROSC after 5 min   - 2nd degree heart block, s/p PPM   - ECHO Takotsubo cardiomyopathy. EF 50-55%, LVH, mild pHTN    - s/p ICU course; Hemodynamically stable now  - cont w/ Metoprolol, ASA and atorvastatin     # Acute/Chronic Respiratory failure w/ Hypoxia and Hypercapnia/ COPD   - s/p intubation; failed extubation   - Pulmonary on board   - tolerating Trach collar, vent discontinued; continue weaning as per Pulmonary   - cont w/ Symbicort, Duonebs prn  - Surgery on board, s/p Trach change to #4 fenestrated tracheostomy placed 1/30.     Capping trial per pulmonary this week.       # Multiple infectious Disease   - trachobronchitis secondary to citrobacter?? - resolved  - enterococcal faecalis cellulitis - resolved   - infected left hand hematoma - s/p bedside debridement 12/13 w/ hand surgery   - monitor off abx     # Occlusive Left subclavian thrombosis   - LUE venous duplex : occlusive thrombus in the left mid subclavian vein with partial slow flow detected in the lateral subclavian vein.  - LUE arterial Duplex neg   - cont w/ Eliquis     Myoclonic Seizure   - EEG: Myoclonic seizure  - Neurology on board   - cont w/ Keppra Depakote    # HTN   - cont w/ Metoprolol     # DM2   - A1c 8.8%  - cont w/ Lantus   - cont w/ FS monitoring w/ insulin s/s coverage     # Cad s/p PCI w/ stent/ CABG  - cont with ASA and Atorvastatin     # Anemia of Chronic Disease  - h/h stable, will cont  to monitor     # Functional quadriplegic   - RITA on discharge   - supportive care    # Stage 4 decubiti sacrum wound  - s/p debridement 12/19  -cont w/ wound care as recommended     # Dysphagia/ Moderate Protein Calorie Malnutrition  - Peg tube in place   - continue with tube feeding at goal as tolerated.     # Constipation  - cont w/ senna, Miralax prn     Dvt ppx: Eliquis   Dipso: Patient has no insurance and Family in process of getting guardianship. Patient will then need placement.     Plan discussed with son at bedside

## 2023-02-13 NOTE — PROGRESS NOTE ADULT - ASSESSMENT
REVIEW OF SYMPTOMS      Able to give (reliable) ROS  NO     PHYSICAL EXAM    HEENT Unremarkable  atraumatic   RESP Fair air entry EXP prolonged    Harsh breath sound Resp distres mild   CARDIAC S1 S2 No S3     NO JVD    ABDOMEN SOFT BS PRESENT NOT DISTENDED No hepatosplenomegaly   PEDAL EDEMA present No calf tenderness  NO rash     GENERAL DATA .   GOC.  12/11/2022 full code       ALLGY.  nka                            WT. ..  12/2/2022 86  BMI. ..    12/2/2022 29                          ICU STAY.  .. 11/29-12/9  COVID.   .. 12/2/2022 scv2 (-)   .. 11/20/2022 scv2 (-)   BEST PRACTICE ISSUES.    HOB ELEVATN. Yes  DVT PPLX.    .. 1/3/2023 apixa 5.2 (vte)    (DVT 12/12 l Subclav throm)  ALEJO PPLX.   INFN PPLX.   .. 11/14 chlorhex 2%   SP SW ANTWAN.         DIET.    ..  12/15 glucerna 1.2 1440 gt   IV fl.    PROCEDURES.  .. 1/28  size 4 fenestrated cuffed trach placed by surgery   .. 12/19 debridement of sacral wound   .. 12/12 r arm midline     ABGS.  1/28/2023 afeb 70 100/60   1/28/2023 tc 97%  1/6/2023 ac 16/450/.3/5 746/49/123     VS/ PO/IO/ VENT/ DRIPS.  2/13/2023 afeb 72 110/70    PATIENT PRESENTATION.  72 m doa 11/14/2022 cac    PMH  PMH CAD s/p PCI with stent 2015 and CABG 2014,   PMH  s/p med[employer] PPM,   PMH CVA (lacunar infarct)    HOSPITAL COURSE   .. 1) PEA arrest with ROSC after approximately 5 min 11/14  Now communicative   .. 2) DVT 12/12 l Subclav thromS 12/15/2022 lvnx 80.2 1/3 changed to apixaban 5.2  .. 3) TRACH 12/5/2022 trach  .. 4) PEG12/5/2022 peg   .. 5) Cellulitis hand absc 12/13 mod enterococcus fecalis  staph epi 12/12-12/15/2022  cephalexin 12/15 vanco once  12/16-12/19 zosyn  .. 6) Vent weaning       PROBLEM ASSESSMENT RECOMMENDATIONS.  Trach 12/5   .. trach care   Trach change  ..  1/28 size 4 fenestrated cuffed trach  Trach wean.  .. 2/6/2023 pt still has lot of secretions  VTE  .. On apixaba  INFECTION.  .. w 1/17-1/18-1/20-1/21-2/3-2/8-2/10/2023      w 8.3- 7.9 - 6.8 - 7.2 - 8.8  - 10- 10.8  .. pr 1/17-1/20/2023 (-)  (-)   .. 1/16-1/20/2023 Rocephin started by hospitalist dced   CAD.  .. 11/14 asa 81   .. 12/13 metoprolol 25.2   CHF   .. Cr 2/6/2023 Cr .7   .. 11/14/2022 echo mod decr segmental lvsf apical lateral apiccal ant segment are abn takotsubo cmpthy pasp 42 .  .. Monitor for chf has chr hfref per echo   COPD   .. 2/1 duoneb p   .. 12/30 symbicort 160   .. 1/7/2023 glycopyrrolate 1.2   .. 1/9/2023 ipratropium  .. 1/15/2023 scopolamine   .. continue bd ics for copd   Anemia.  .. Hb 1/12-1/14-1/19-1/20-7/21-1/25-2/3-2/8-2/10/2023       Hb 9.8- 10 -9.1- 9.8  - 9.2 - 9.5 -10- 10 - 10.5   .. target hb 7 (+)  .. monitor   sp cac   .. good neuro recovery awake alert interactive   VTE  .. 1/3/2023 apixa 5.2 (vte)    FAMILY COMMUNICATION.  .. 2/12/2023 dw pt son  will ask speech eval for passey cordelia valve  .. 2/12/2023 Pt has a fenestrated trach and has fenestrated inner cannula which is available to place before trying passy cordelia valve     OVERALL   WEANED OFF VENT 1/23   TRACH WEANING  Will start capping when secretions decrease  FLAILING OF LOWER EXTR   .. Jersks started 1/16   .. 1/19/2023 myoclonic jerks improvd on depakote       TIME SPENT   Over 25 minutes aggregate care time spent on encounter; activities included   direct patient care, counseling and/or coordinating care reviewing notes, lab data/ imaging , discussion with multidisciplinary team/ patient  /family and explaining in detail risks, benefits, alternatives  of the recommendations     BRANDON CAMARILLO

## 2023-02-14 LAB
GLUCOSE BLDC GLUCOMTR-MCNC: 149 MG/DL — HIGH (ref 70–99)
GLUCOSE BLDC GLUCOMTR-MCNC: 169 MG/DL — HIGH (ref 70–99)
GLUCOSE BLDC GLUCOMTR-MCNC: 179 MG/DL — HIGH (ref 70–99)
GLUCOSE BLDC GLUCOMTR-MCNC: 180 MG/DL — HIGH (ref 70–99)
GLUCOSE BLDC GLUCOMTR-MCNC: 208 MG/DL — HIGH (ref 70–99)

## 2023-02-14 PROCEDURE — 99232 SBSQ HOSP IP/OBS MODERATE 35: CPT

## 2023-02-14 RX ADMIN — Medication 1 MILLIGRAM(S): at 12:17

## 2023-02-14 RX ADMIN — Medication 1 APPLICATION(S): at 17:36

## 2023-02-14 RX ADMIN — BUDESONIDE AND FORMOTEROL FUMARATE DIHYDRATE 2 PUFF(S): 160; 4.5 AEROSOL RESPIRATORY (INHALATION) at 17:14

## 2023-02-14 RX ADMIN — PREGABALIN 1000 MICROGRAM(S): 225 CAPSULE ORAL at 12:17

## 2023-02-14 RX ADMIN — SCOPALAMINE 1 PATCH: 1 PATCH, EXTENDED RELEASE TRANSDERMAL at 23:00

## 2023-02-14 RX ADMIN — LEVETIRACETAM 1500 MILLIGRAM(S): 250 TABLET, FILM COATED ORAL at 05:47

## 2023-02-14 RX ADMIN — Medication 25 MILLIGRAM(S): at 05:46

## 2023-02-14 RX ADMIN — SCOPALAMINE 1 PATCH: 1 PATCH, EXTENDED RELEASE TRANSDERMAL at 06:13

## 2023-02-14 RX ADMIN — Medication 1 APPLICATION(S): at 17:37

## 2023-02-14 RX ADMIN — ROBINUL 1 MILLIGRAM(S): 0.2 INJECTION INTRAMUSCULAR; INTRAVENOUS at 05:47

## 2023-02-14 RX ADMIN — ATORVASTATIN CALCIUM 20 MILLIGRAM(S): 80 TABLET, FILM COATED ORAL at 21:15

## 2023-02-14 RX ADMIN — SENNA PLUS 2 TABLET(S): 8.6 TABLET ORAL at 21:16

## 2023-02-14 RX ADMIN — INSULIN GLARGINE 10 UNIT(S): 100 INJECTION, SOLUTION SUBCUTANEOUS at 22:56

## 2023-02-14 RX ADMIN — Medication 1 APPLICATION(S): at 17:35

## 2023-02-14 RX ADMIN — Medication 1 APPLICATION(S): at 06:12

## 2023-02-14 RX ADMIN — Medication 1 APPLICATION(S): at 05:47

## 2023-02-14 RX ADMIN — Medication 1: at 06:01

## 2023-02-14 RX ADMIN — CHLORHEXIDINE GLUCONATE 1 APPLICATION(S): 213 SOLUTION TOPICAL at 05:50

## 2023-02-14 RX ADMIN — LEVETIRACETAM 1500 MILLIGRAM(S): 250 TABLET, FILM COATED ORAL at 17:36

## 2023-02-14 RX ADMIN — Medication 25 MILLIGRAM(S): at 17:36

## 2023-02-14 RX ADMIN — Medication 1 APPLICATION(S): at 05:49

## 2023-02-14 RX ADMIN — Medication 2: at 23:02

## 2023-02-14 RX ADMIN — Medication 1: at 12:19

## 2023-02-14 RX ADMIN — Medication 81 MILLIGRAM(S): at 12:17

## 2023-02-14 RX ADMIN — SCOPALAMINE 1 PATCH: 1 PATCH, EXTENDED RELEASE TRANSDERMAL at 23:54

## 2023-02-14 RX ADMIN — APIXABAN 5 MILLIGRAM(S): 2.5 TABLET, FILM COATED ORAL at 17:36

## 2023-02-14 RX ADMIN — APIXABAN 5 MILLIGRAM(S): 2.5 TABLET, FILM COATED ORAL at 05:46

## 2023-02-14 RX ADMIN — SCOPALAMINE 1 PATCH: 1 PATCH, EXTENDED RELEASE TRANSDERMAL at 17:36

## 2023-02-14 RX ADMIN — BUDESONIDE AND FORMOTEROL FUMARATE DIHYDRATE 2 PUFF(S): 160; 4.5 AEROSOL RESPIRATORY (INHALATION) at 05:50

## 2023-02-14 RX ADMIN — Medication 750 MILLIGRAM(S): at 21:16

## 2023-02-14 RX ADMIN — Medication 750 MILLIGRAM(S): at 12:17

## 2023-02-14 RX ADMIN — POLYETHYLENE GLYCOL 3350 17 GRAM(S): 17 POWDER, FOR SOLUTION ORAL at 12:02

## 2023-02-14 RX ADMIN — Medication 0: at 17:36

## 2023-02-14 NOTE — PROGRESS NOTE ADULT - ASSESSMENT
REVIEW OF SYMPTOMS      Able to give (reliable) ROS  NO     PHYSICAL EXAM    HEENT Unremarkable  atraumatic   RESP Fair air entry EXP prolonged    Harsh breath sound Resp distres mild   CARDIAC S1 S2 No S3     NO JVD    ABDOMEN SOFT BS PRESENT NOT DISTENDED No hepatosplenomegaly   PEDAL EDEMA present No calf tenderness  NO rash       GENERAL DATA .   GOC.  12/11/2022 full code       ALLGY.  nka                            WT. ..  12/2/2022 86  BMI. ..    12/2/2022 29                          ICU STAY.  .. 11/29-12/9  COVID.   .. 12/2/2022 scv2 (-)   .. 11/20/2022 scv2 (-)   BEST PRACTICE ISSUES.    HOB ELEVATN. Yes  DVT PPLX.    .. 1/3/2023 apixa 5.2 (vte)    (DVT 12/12 l Subclav throm)  ALEJO PPLX.   INFN PPLX.   .. 11/14 chlorhex 2%   SP SW ANTWAN.         DIET.    ..  12/15 glucerna 1.2 1440 gt   IV fl.    PROCEDURES.  .. 1/28  size 4 fenestrated cuffed trach placed by surgery   .. 12/19 debridement of sacral wound     ABGS.  1/6/2023 ac 16/450/.3/5 746/49/123     VS/ PO/IO/ VENT/ DRIPS.  2/14/2023 afeb 66 119/70   2/14/2023 tc 28% po 98%     PATIENT PRESENTATION.  72 m doa 11/14/2022 cac    PMH  PMH CAD s/p PCI with stent 2015 and CABG 2014,   PMH  s/p medtronic PPM,   PMH CVA (lacunar infarct)    HOSPITAL COURSE   .. 1) PEA arrest with ROSC after approximately 5 min 11/14  Now communicative   .. 2) DVT 12/12 l Subclav thromS 12/15/2022 lvnx 80.2 1/3 changed to apixaban 5.2  .. 3) TRACH 12/5/2022 trach  .. 4) PEG12/5/2022 peg   .. 5) Cellulitis hand absc 12/13 mod enterococcus fecalis  staph epi 12/12-12/15/2022  cephalexin 12/15 vanco once  12/16-12/19 zosyn  .. 6) Vent weaning     PROBLEM ASSESSMENT RECOMMENDATIONS.  Trach 12/5   .. trach care   Trach change  ..  1/28 size 4 fenestrated cuffed trach  Trach wean.  .. 2/6/2023 pt still has lot of secretions  VTE  .. On apixaba  INFECTION.  .. w 1/17-1/18-1/20-1/21-2/3-2/8-2/10/2023      w 8.3- 7.9 - 6.8 - 7.2 - 8.8  - 10- 10.8  .. pr 1/17-1/20/2023 (-)  (-)   .. 1/16-1/20/2023 Rocephin started by hospitalist dced   CAD.  .. 11/14 asa 81   .. 12/13 metoprolol 25.2   CHF   .. Cr 2/6/2023 Cr .7   .. 11/14/2022 echo mod decr segmental lvsf apical lateral apiccal ant segment are abn takotsubo cmpthy pasp 42 .  .. Monitor for chf has chr hfref per echo   COPD   .. 2/1 duoneb p   .. 12/30 symbicort 160   .. 1/7/2023 glycopyrrolate 1.2   .. 1/9/2023 ipratropium  .. 1/15/2023 scopolamine   .. continue bd ics for copd   Anemia.  .. Hb 1/12-1/14-1/19-1/20-7/21-1/25-2/3-2/8-2/10/2023       Hb 9.8- 10 -9.1- 9.8  - 9.2 - 9.5 -10- 10 - 10.5   .. target hb 7 (+)  .. monitor   sp cac   .. good neuro recovery awake alert interactive   VTE  .. 1/3/2023 apixa 5.2 (vte)    FAMILY COMMUNICATION.  .. 2/12/2023 dw pt son  will ask speech eval for passey cordelia valve  .. 2/12/2023 Pt has a fenestrated trach and has fenestrated inner cannula which is available to place before trying passy cordelia valve     OVERALL   WEANED OFF VENT 1/23   TRACH WEANING    Will start capping when secretions decrease  2/14/2023 dw speech they will try PM valve with fenestrated cannula   FLAILING OF LOWER EXTR   .. Jersks started 1/16   .. 1/19/2023 myoclonic jerks improvd on depakote       TIME SPENT   Over 25 minutes aggregate care time spent on encounter; activities included   direct patient care, counseling and/or coordinating care reviewing notes, lab data/ imaging , discussion with multidisciplinary team/ patient  /family and explaining in detail risks, benefits, alternatives  of the recommendations     BRANDON CAMARILLO

## 2023-02-14 NOTE — PROGRESS NOTE ADULT - ASSESSMENT
71 yo male w/ pmhx of COPD, CAD sp PCI w/ stent, CABG 2014, HF w/ PeF, 2nd degree heart block, sp PPM, HTN, DM, CKD Stage 3, CVA- lacunar infarcts admitted to ICU originally for cardiac arrest. ACLS for PEA arrest and ROSC returned after 5 minutes. Cardiac arrest possibly secondary to severe hypoglycemia from eating less and not tracking blood sugar carefully. Hospital course complicated by 1) prolonged hypoxemic respiratory failure. sp trach and peg 2) left upper extremity DVT and placed on eliquis 3) multiple infections (enteroccoal facelis cellutis, infected left hand hematoma, tracheobronchitis secondary to citrobacr 4) tonic clonic seizure - on keppra/depakoate- currently undergoing reevaluation by NEUROLOGY 5) fever of 102 on 1/19. INFECTIOUS DISEASE reconsulted and pan culture ordered       # s/p Cardiac Arrest, acute metabolic encephalopathy  - s/p ACLS w/ ROSC after 5 min   - 2nd degree heart block, s/p PPM   - ECHO Takotsubo cardiomyopathy. EF 50-55%, LVH, mild pHTN    - s/p ICU course; Hemodynamically stable now  - cont w/ Metoprolol, ASA and atorvastatin     # Acute/Chronic Respiratory failure w/ Hypoxia and Hypercapnia/ COPD   - s/p intubation; failed extubation   - Pulmonary on board   - tolerating Trach collar, vent discontinued; continue weaning as per Pulmonary   - cont w/ Symbicort, Duonebs prn  - Surgery on board, s/p Trach change to #4 fenestrated tracheostomy placed 1/30.     Capping trial per pulmonary this week.       # Multiple infectious Disease   - trachobronchitis secondary to citrobacter?? - resolved  - enterococcal faecalis cellulitis - resolved   - infected left hand hematoma - s/p bedside debridement 12/13 w/ hand surgery   - monitor off abx     # Occlusive Left subclavian thrombosis   - LUE venous duplex : occlusive thrombus in the left mid subclavian vein with partial slow flow detected in the lateral subclavian vein.  - LUE arterial Duplex neg   - cont w/ Eliquis     Myoclonic Seizure   - EEG: Myoclonic seizure  - Neurology on board   - cont w/ Keppra Depakote    # HTN   - cont w/ Metoprolol     # DM2   - A1c 8.8%  - cont w/ Lantus   - cont w/ FS monitoring w/ insulin s/s coverage     # Cad s/p PCI w/ stent/ CABG  - cont with ASA and Atorvastatin     # Anemia of Chronic Disease  - h/h stable, will cont  to monitor     # Stage 4 decubiti sacrum wound  - s/p debridement 12/19  -cont w/ wound care as recommended     # Dysphagia/ Moderate Protein Calorie Malnutrition  - Peg tube in place   - continue with tube feeding at goal as tolerated.     # Constipation  - cont w/ senna, Miralax prn     Dvt ppx: Eliquis   Dipso: Patient has no insurance and Family in process of getting guardianship. Patient will then need placement.

## 2023-02-14 NOTE — PROGRESS NOTE ADULT - SUBJECTIVE AND OBJECTIVE BOX
Pt seen and examined at bedside.     ROS: all systems reviewed and wnl      Vital Signs Last 24 Hrs  T(C): 36.3 (14 Feb 2023 04:55), Max: 36.9 (13 Feb 2023 11:00)  T(F): 97.3 (14 Feb 2023 04:55), Max: 98.4 (13 Feb 2023 11:00)  HR: 65 (14 Feb 2023 09:02) (65 - 90)  BP: 129/81 (14 Feb 2023 04:55) (111/71 - 129/81)  BP(mean): --  RR: 18 (14 Feb 2023 09:02) (18 - 19)  SpO2: 97% (14 Feb 2023 09:02) (95% - 100%)    Parameters below as of 14 Feb 2023 09:02  Patient On (Oxygen Delivery Method): tracheostomy collar  O2 Flow (L/min): 5  O2 Concentration (%): 28        GENERAL: stable, in bed, son at bedside, more alert today. PEG feeds in place.   NECK: supple, No JVD  CHEST/LUNG: clear to auscultation bilaterally; no rales, rhonchi, or wheezing b/l  HEART: normal S1, S2  ABDOMEN: BS+, soft, ND, NT   EXTREMITIES:  pulses palpable; no clubbing, cyanosis, or edema b/l LEs      LABS:        MEDICATIONS  (STANDING):  apixaban 5 milliGRAM(s) Oral every 12 hours  aspirin  chewable 81 milliGRAM(s) Oral daily  atorvastatin 20 milliGRAM(s) Oral at bedtime  bacitracin   Ointment 1 Application(s) Topical two times a day  bacitracin   Ointment 1 Application(s) Topical two times a day  budesonide 160 MICROgram(s)/formoterol 4.5 MICROgram(s) Inhaler 2 Puff(s) Inhalation two times a day  chlorhexidine 2% Cloths 1 Application(s) Topical <User Schedule>  collagenase Ointment 1 Application(s) Topical two times a day  cyanocobalamin 1000 MICROGram(s) Oral daily  dextrose 5%. 1000 milliLiter(s) (100 mL/Hr) IV Continuous <Continuous>  dextrose 5%. 1000 milliLiter(s) (50 mL/Hr) IV Continuous <Continuous>  dextrose 50% Injectable 25 Gram(s) IV Push once  dextrose 50% Injectable 12.5 Gram(s) IV Push once  dextrose 50% Injectable 25 Gram(s) IV Push once  diphenhydrAMINE Injectable 50 milliGRAM(s) IV Push once  folic acid 1 milliGRAM(s) Oral daily  glucagon  Injectable 1 milliGRAM(s) IntraMuscular once  glycopyrrolate 1 milliGRAM(s) Oral two times a day  insulin glargine Injectable (LANTUS) 10 Unit(s) SubCutaneous at bedtime  insulin lispro (ADMELOG) corrective regimen sliding scale   SubCutaneous every 6 hours  levETIRAcetam 1500 milliGRAM(s) Oral two times a day  metoprolol tartrate 25 milliGRAM(s) Enteral Tube two times a day  polyethylene glycol 3350 17 Gram(s) Oral daily  scopolamine 1 mG/72 Hr(s) Patch 1 Patch Transdermal every 72 hours  senna 2 Tablet(s) Oral at bedtime  silver sulfADIAZINE 1% Cream 1 Application(s) Topical two times a day  valproic  acid Syrup 750 milliGRAM(s) Oral <User Schedule>  vitamin A &amp; D Ointment 1 Application(s) Topical two times a day    MEDICATIONS  (PRN):  acetaminophen     Tablet .. 650 milliGRAM(s) Oral every 6 hours PRN Temp greater or equal to 38C (100.4F), Mild Pain (1 - 3)  albuterol/ipratropium for Nebulization 3 milliLiter(s) Nebulizer every 6 hours PRN Shortness of Breath and/or Wheezing  aluminum hydroxide/magnesium hydroxide/simethicone Suspension 30 milliLiter(s) Oral every 4 hours PRN Dyspepsia  dextrose Oral Gel 15 Gram(s) Oral once PRN Blood Glucose LESS THAN 70 milliGRAM(s)/deciliter

## 2023-02-14 NOTE — PROGRESS NOTE ADULT - SUBJECTIVE AND OBJECTIVE BOX
BRANDON MÉNDEZL    [facility] [address]    Allergies    No Known Allergies    Intolerances        PAST MEDICAL & SURGICAL HISTORY:  HTN (hypertension)      HLD (hyperlipidemia)      Diabetes mellitus      Lacunar infarction      BPH (benign prostatic hyperplasia)      CHF (congestive heart failure)      Chronic obstructive pulmonary disease, unspecified COPD type      S/P CABG (coronary artery bypass graft)  2014          FAMILY HISTORY:      Home Medications:  aspirin 81 mg oral delayed release tablet: 1 tab(s) orally once a day (12 Jun 2020 17:35)  Liquifilm Tears preserved ophthalmic solution: 1 drop(s) to each affected eye 2 times a day (12 Jun 2020 17:35)      MEDICATIONS  (STANDING):  apixaban 5 milliGRAM(s) Oral every 12 hours  aspirin  chewable 81 milliGRAM(s) Oral daily  atorvastatin 20 milliGRAM(s) Oral at bedtime  bacitracin   Ointment 1 Application(s) Topical two times a day  bacitracin   Ointment 1 Application(s) Topical two times a day  budesonide 160 MICROgram(s)/formoterol 4.5 MICROgram(s) Inhaler 2 Puff(s) Inhalation two times a day  chlorhexidine 2% Cloths 1 Application(s) Topical <User Schedule>  collagenase Ointment 1 Application(s) Topical two times a day  cyanocobalamin 1000 MICROGram(s) Oral daily  dextrose 5%. 1000 milliLiter(s) (50 mL/Hr) IV Continuous <Continuous>  dextrose 5%. 1000 milliLiter(s) (100 mL/Hr) IV Continuous <Continuous>  dextrose 50% Injectable 25 Gram(s) IV Push once  dextrose 50% Injectable 12.5 Gram(s) IV Push once  dextrose 50% Injectable 25 Gram(s) IV Push once  diphenhydrAMINE Injectable 50 milliGRAM(s) IV Push once  folic acid 1 milliGRAM(s) Oral daily  glucagon  Injectable 1 milliGRAM(s) IntraMuscular once  glycopyrrolate 1 milliGRAM(s) Oral two times a day  insulin glargine Injectable (LANTUS) 10 Unit(s) SubCutaneous at bedtime  insulin lispro (ADMELOG) corrective regimen sliding scale   SubCutaneous every 6 hours  levETIRAcetam 1500 milliGRAM(s) Oral two times a day  metoprolol tartrate 25 milliGRAM(s) Enteral Tube two times a day  polyethylene glycol 3350 17 Gram(s) Oral daily  scopolamine 1 mG/72 Hr(s) Patch 1 Patch Transdermal every 72 hours  senna 2 Tablet(s) Oral at bedtime  silver sulfADIAZINE 1% Cream 1 Application(s) Topical two times a day  valproic  acid Syrup 750 milliGRAM(s) Oral <User Schedule>  vitamin A &amp; D Ointment 1 Application(s) Topical two times a day    MEDICATIONS  (PRN):  acetaminophen     Tablet .. 650 milliGRAM(s) Oral every 6 hours PRN Temp greater or equal to 38C (100.4F), Mild Pain (1 - 3)  albuterol/ipratropium for Nebulization 3 milliLiter(s) Nebulizer every 6 hours PRN Shortness of Breath and/or Wheezing  aluminum hydroxide/magnesium hydroxide/simethicone Suspension 30 milliLiter(s) Oral every 4 hours PRN Dyspepsia  dextrose Oral Gel 15 Gram(s) Oral once PRN Blood Glucose LESS THAN 70 milliGRAM(s)/deciliter      Diet, NPO with Tube Feed:   Tube Feeding Modality: Gastrostomy  Glucerna 1.2 Pedrito  Total Volume for 24 Hours (mL): 1440  Continuous  Until Goal Tube Feed Rate (mL per Hour): 60  Tube Feed Duration (in Hours): 24  Tube Feed Start Time: 14:30  Free Water Flush  Free Water Flush Instructions:  30 ml/hr via pump  Liquid Protein Supplement     Qty per Day:  1 (01-09-23 @ 13:45) [Active]          Vital Signs Last 24 Hrs  T(C): 36.4 (13 Feb 2023 23:58), Max: 36.9 (13 Feb 2023 11:00)  T(F): 97.5 (13 Feb 2023 23:58), Max: 98.4 (13 Feb 2023 11:00)  HR: 81 (13 Feb 2023 23:58) (71 - 90)  BP: 115/68 (13 Feb 2023 23:58) (111/71 - 125/71)  BP(mean): --  RR: 18 (13 Feb 2023 23:58) (18 - 18)  SpO2: 98% (13 Feb 2023 23:58) (97% - 100%)    Parameters below as of 13 Feb 2023 20:14  Patient On (Oxygen Delivery Method): tracheostomy collar                  LABS:                    WBC:  WBC Count: 10.88 K/uL (02-10 @ 08:35)      MICROBIOLOGY:  RECENT CULTURES:                  Sodium:  Sodium, Serum: 142 mmol/L (02-10 @ 08:35)      0.86 mg/dL 02-10 @ 08:35      Hemoglobin:  Hemoglobin: 10.5 g/dL (02-10 @ 08:35)      Platelets: Platelet Count - Automated: 284 K/uL (02-10 @ 08:35)              RADIOLOGY & ADDITIONAL STUDIES:      MICROBIOLOGY:  RECENT CULTURES:

## 2023-02-15 LAB
ANION GAP SERPL CALC-SCNC: 7 MMOL/L — SIGNIFICANT CHANGE UP (ref 5–17)
BASOPHILS # BLD AUTO: 0.12 K/UL — SIGNIFICANT CHANGE UP (ref 0–0.2)
BASOPHILS NFR BLD AUTO: 1 % — SIGNIFICANT CHANGE UP (ref 0–2)
BUN SERPL-MCNC: 28 MG/DL — HIGH (ref 7–23)
CALCIUM SERPL-MCNC: 8.8 MG/DL — SIGNIFICANT CHANGE UP (ref 8.5–10.1)
CHLORIDE SERPL-SCNC: 103 MMOL/L — SIGNIFICANT CHANGE UP (ref 96–108)
CO2 SERPL-SCNC: 31 MMOL/L — SIGNIFICANT CHANGE UP (ref 22–31)
CREAT SERPL-MCNC: 0.74 MG/DL — SIGNIFICANT CHANGE UP (ref 0.5–1.3)
EGFR: 95 ML/MIN/1.73M2 — SIGNIFICANT CHANGE UP
EOSINOPHIL # BLD AUTO: 0.94 K/UL — HIGH (ref 0–0.5)
EOSINOPHIL NFR BLD AUTO: 8.1 % — HIGH (ref 0–6)
GLUCOSE BLDC GLUCOMTR-MCNC: 166 MG/DL — HIGH (ref 70–99)
GLUCOSE BLDC GLUCOMTR-MCNC: 177 MG/DL — HIGH (ref 70–99)
GLUCOSE BLDC GLUCOMTR-MCNC: 185 MG/DL — HIGH (ref 70–99)
GLUCOSE SERPL-MCNC: 175 MG/DL — HIGH (ref 70–99)
HCT VFR BLD CALC: 34 % — LOW (ref 39–50)
HGB BLD-MCNC: 10.8 G/DL — LOW (ref 13–17)
IMM GRANULOCYTES NFR BLD AUTO: 0.9 % — SIGNIFICANT CHANGE UP (ref 0–0.9)
LYMPHOCYTES # BLD AUTO: 2.82 K/UL — SIGNIFICANT CHANGE UP (ref 1–3.3)
LYMPHOCYTES # BLD AUTO: 24.3 % — SIGNIFICANT CHANGE UP (ref 13–44)
MAGNESIUM SERPL-MCNC: 2.2 MG/DL — SIGNIFICANT CHANGE UP (ref 1.6–2.6)
MCHC RBC-ENTMCNC: 30.2 PG — SIGNIFICANT CHANGE UP (ref 27–34)
MCHC RBC-ENTMCNC: 31.8 G/DL — LOW (ref 32–36)
MCV RBC AUTO: 95 FL — SIGNIFICANT CHANGE UP (ref 80–100)
MONOCYTES # BLD AUTO: 1.15 K/UL — HIGH (ref 0–0.9)
MONOCYTES NFR BLD AUTO: 9.9 % — SIGNIFICANT CHANGE UP (ref 2–14)
NEUTROPHILS # BLD AUTO: 6.45 K/UL — SIGNIFICANT CHANGE UP (ref 1.8–7.4)
NEUTROPHILS NFR BLD AUTO: 55.8 % — SIGNIFICANT CHANGE UP (ref 43–77)
NRBC # BLD: 0 /100 WBCS — SIGNIFICANT CHANGE UP (ref 0–0)
PHOSPHATE SERPL-MCNC: 3.2 MG/DL — SIGNIFICANT CHANGE UP (ref 2.5–4.5)
PLATELET # BLD AUTO: 295 K/UL — SIGNIFICANT CHANGE UP (ref 150–400)
POTASSIUM SERPL-MCNC: 4 MMOL/L — SIGNIFICANT CHANGE UP (ref 3.5–5.3)
POTASSIUM SERPL-SCNC: 4 MMOL/L — SIGNIFICANT CHANGE UP (ref 3.5–5.3)
RBC # BLD: 3.58 M/UL — LOW (ref 4.2–5.8)
RBC # FLD: 14.4 % — SIGNIFICANT CHANGE UP (ref 10.3–14.5)
SODIUM SERPL-SCNC: 141 MMOL/L — SIGNIFICANT CHANGE UP (ref 135–145)
WBC # BLD: 11.59 K/UL — HIGH (ref 3.8–10.5)
WBC # FLD AUTO: 11.59 K/UL — HIGH (ref 3.8–10.5)

## 2023-02-15 PROCEDURE — 99233 SBSQ HOSP IP/OBS HIGH 50: CPT

## 2023-02-15 PROCEDURE — 99232 SBSQ HOSP IP/OBS MODERATE 35: CPT

## 2023-02-15 RX ADMIN — Medication 1 APPLICATION(S): at 05:26

## 2023-02-15 RX ADMIN — Medication 1 APPLICATION(S): at 05:30

## 2023-02-15 RX ADMIN — Medication 750 MILLIGRAM(S): at 21:05

## 2023-02-15 RX ADMIN — LEVETIRACETAM 1500 MILLIGRAM(S): 250 TABLET, FILM COATED ORAL at 05:25

## 2023-02-15 RX ADMIN — LEVETIRACETAM 1500 MILLIGRAM(S): 250 TABLET, FILM COATED ORAL at 17:33

## 2023-02-15 RX ADMIN — ATORVASTATIN CALCIUM 20 MILLIGRAM(S): 80 TABLET, FILM COATED ORAL at 21:06

## 2023-02-15 RX ADMIN — Medication 1 MILLIGRAM(S): at 11:06

## 2023-02-15 RX ADMIN — SCOPALAMINE 1 PATCH: 1 PATCH, EXTENDED RELEASE TRANSDERMAL at 17:34

## 2023-02-15 RX ADMIN — Medication 750 MILLIGRAM(S): at 08:45

## 2023-02-15 RX ADMIN — CHLORHEXIDINE GLUCONATE 1 APPLICATION(S): 213 SOLUTION TOPICAL at 05:25

## 2023-02-15 RX ADMIN — Medication 81 MILLIGRAM(S): at 11:06

## 2023-02-15 RX ADMIN — APIXABAN 5 MILLIGRAM(S): 2.5 TABLET, FILM COATED ORAL at 05:24

## 2023-02-15 RX ADMIN — Medication 1 APPLICATION(S): at 17:35

## 2023-02-15 RX ADMIN — Medication 1: at 21:04

## 2023-02-15 RX ADMIN — Medication 1 APPLICATION(S): at 17:28

## 2023-02-15 RX ADMIN — Medication 1 APPLICATION(S): at 05:27

## 2023-02-15 RX ADMIN — ROBINUL 1 MILLIGRAM(S): 0.2 INJECTION INTRAMUSCULAR; INTRAVENOUS at 17:29

## 2023-02-15 RX ADMIN — ROBINUL 1 MILLIGRAM(S): 0.2 INJECTION INTRAMUSCULAR; INTRAVENOUS at 05:24

## 2023-02-15 RX ADMIN — Medication 1 APPLICATION(S): at 17:34

## 2023-02-15 RX ADMIN — Medication 25 MILLIGRAM(S): at 17:33

## 2023-02-15 RX ADMIN — INSULIN GLARGINE 10 UNIT(S): 100 INJECTION, SOLUTION SUBCUTANEOUS at 21:05

## 2023-02-15 RX ADMIN — APIXABAN 5 MILLIGRAM(S): 2.5 TABLET, FILM COATED ORAL at 17:28

## 2023-02-15 RX ADMIN — Medication 1: at 06:32

## 2023-02-15 RX ADMIN — Medication 1 APPLICATION(S): at 05:31

## 2023-02-15 RX ADMIN — SENNA PLUS 2 TABLET(S): 8.6 TABLET ORAL at 21:06

## 2023-02-15 RX ADMIN — Medication 0: at 12:19

## 2023-02-15 RX ADMIN — Medication 1 APPLICATION(S): at 17:29

## 2023-02-15 RX ADMIN — PREGABALIN 1000 MICROGRAM(S): 225 CAPSULE ORAL at 11:06

## 2023-02-15 RX ADMIN — BUDESONIDE AND FORMOTEROL FUMARATE DIHYDRATE 2 PUFF(S): 160; 4.5 AEROSOL RESPIRATORY (INHALATION) at 05:26

## 2023-02-15 NOTE — PROGRESS NOTE ADULT - ASSESSMENT
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 ACLS for PEA arrest and ROSC returned after 5 minutes. Cardiac arrest possibly secondary to severe hypoglycemia from eating less and not tracking blood sugar carefully. Hospital course complicated by 1) prolonged hypoxemic respiratory failure. difficult extubation requiring s/p trach and peg 2) left upper extremity/left subclavian DVT and placed on eliquis 3) multiple infections (enteroccoal facelis cellulitis, infected left hand hematoma, tracheobronchitis secondary to citrobacter 4) tonic clonic seizure - on keppra/depakoate- currently undergoing reevaluation by NEUROLOGY 5) fever of 102 on 1/19. INFECTIOUS DISEASE reconsulted and pan culture ordered.      # s/p Cardiac Arrest, acute metabolic encephalopathy due to severe hypoglycemia.  - s/p ACLS w/ ROSC after 5 min   - 2nd degree heart block, s/p PPM   - ECHO Takotsubo cardiomyopathy. EF 50-55%, LVH, mild pHTN    - s/p ICU course; Hemodynamically stable now  - cont w/ Metoprolol, ASA and atorvastatin     # Acute/Chronic Respiratory failure w/ Hypoxia and Hypercapnia/ COPD   - s/p intubation; failed extubation requiring s/p trach and peg   - Pulmonary on board   - tolerating Trach collar, vent discontinued; continue weaning as per Pulmonary   - cont w/ Symbicort, Duonebs prn  - Surgery on board, s/p Trach change to #4 fenestrated tracheostomy placed 1/30. Replaced on 02/14/2023.  -Capping trial per pulmonary this week.       # Multiple infectious Disease   - tracheo-bronchitis secondary to citrobacter?? - resolved.  - enterococcal faecalis cellulitis - resolved   - infected left hand hematoma - s/p bedside debridement 12/13 w/ hand surgery   - monitor off abx              # Occlusive Left subclavian thrombosis   - LUE venous duplex : occlusive thrombus in the left mid subclavian vein with partial slow flow detected in the lateral subclavian vein.  - LUE arterial Duplex neg   - cont w/ Eliquis     Myoclonic Seizure   - EEG: Myoclonic seizure  - Neurology on board   - cont w/ Keppra Depakote    # DM2   - A1c 8.8%  - cont w/ Lantus   - cont w/ FS monitoring w/ insulin s/s coverage     # HTN, Cad s/p PCI w/ stent/ CABG and HPL.  - cont with ASA, Metoprolol and Atorvastatin     # Anemia of Chronic Disease  - h/h stable, will cont  to monitor     # Stage 4 decubiti sacrum wound  - s/p debridement 12/19  -cont w/ wound care as recommended     # Dysphagia/ Moderate Protein Calorie Malnutrition  - Peg tube in place   - continue with tube feeding at goal as tolerated.     # Constipation  - cont w/ senna, Miralax prn     Dvt ppx: Eliquis   Dipso: Patient has no insurance and Family in process of getting guardianship. Patient will then need placement.     D/w his son Mr. Barlow at bedside, and he verbalized understanding.

## 2023-02-15 NOTE — SPEAKING VALVE EVALUATION - H & P REVIEW
"yes  BIBEMS after son returned home from work to find pt unresponsive with noted blood in mouth and sonorous breathing. Unknown how long pt unresponsive for at home. Blood sugar in the 40s with EMS s/p glucagon. On arrival to ER pt hypothermic and agonally breathing. Pt became unresponsive with loss of pulse; ACLS initiated. PEA arrest with ROSC after approximately 5 min s/p Epi x2.  Pt intubated during CODE BLUE" s/p trach/PEG 12/5. pt transferred to [facility] 12/8. pt weaned from vent, and trach downsized from size 6 cuffed to size 4 cuffless. pt tolerating trach collar./yes

## 2023-02-15 NOTE — SWALLOW BEDSIDE ASSESSMENT ADULT - H & P REVIEW
"72 year old male with a PMH of lacunar infarct, CHF (EF ~40% per son), CAD s/p PCI with stent and CABGx3 (~2020), s/p PPM (medtronic), HTN, COPD, DMII on insulin and PO meds, BPH, and CKD (stage II?) admitted to ICU for post cardiac arrest management (ROSC achieved ~5 mins). Pt remained intubated, hypothermic, not on any vasopressor or inotropic support at this time. ELMER on CKD noted on labs. Hypoglycemic event proceeding arrest, likely iatrogenic/medication related in cardiac patient with extensive history. Pt noted to have right rib fx (later 4-6; likely 2/2 CPR). "/yes

## 2023-02-15 NOTE — SPEAKING VALVE EVALUATION - REMOVE VALVE FOR
SpO2 < 92%/Increase RR (1.5 x baseline)/Increased work of breathing/Excessive coughing/Sleep/Subjective complaints

## 2023-02-15 NOTE — SWALLOW BEDSIDE ASSESSMENT ADULT - COMMENTS
CXR 1/30/2023INTERPRETATION:  Clinical history: 72-year-old male, status post tracheostomy change.Portable, rotated view of the chest is correlated with the chest CT of 1/13/2023.FINDINGS: Tracheostomy tube projected in satisfactory position.  Normal cardiac silhouette and normal pulmonary vasculature with no consolidation, effusion, pneumothorax or acute osseous finding.  Pacemaker with wire tips in the right atrium, right ventricle and coronary sinus, unchanged.  IMPRESSION:Tracheostomy tube projected in satisfactory position CXR 1/30/2023INTERPRETATION:  Clinical history: 72-year-old male, status post tracheostomy change.Portable, rotated view of the chest is correlated with the chest CT of 1/13/2023.FINDINGS: Tracheostomy tube projected in satisfactory position.  Normal cardiac silhouette and normal pulmonary vasculature with no consolidation, effusion, pneumothorax or acute osseous finding.  Pacemaker with wire tips in the right atrium, right ventricle and coronary sinus, unchanged.  IMPRESSION:Tracheostomy tube projected in satisfactory position    CThead 11/15/2023 IMPRESSION:No significant change from the prior exam.No mass effect or hemorrhage.

## 2023-02-15 NOTE — CHART NOTE - NSCHARTNOTEFT_GEN_A_CORE
Assessment:  Pt seen in 2C unit, able to speak, but c unclear speech, appeared uncomfortable, shaking movements noted, RN made aware.  G-Tube infusing c Glucerna 1.2 @ 60 ml/hr.  Pt adm c dx of hypoglycemia, cardiac arrest, hypothermia, s/p critical care stay, hospital course complicated by prolonged hypoxemic respiratory failure, acute/chronic respiratory failure, s/p ELMER, shock liver, COPD, s/p trach, s/p trach change to fenestrated tracheostomy 01/30, s/p capping at present as per RN, s/p PEG, DVT, multiple infections(enterococcal faecalis cellulitis; resolved, infected hand hematoma; s/p debridement 12/13/22, tracheobronchitis), tonic clonic seizure, occlusive left subclavian thrombosis, fever, anemia of chronic disease, functional quadriplegia, plan for RITA noted, stage IV decubiti; sacrum; s/p debridement 12/19/22, dysphagia, constipation(on senna and polyethylene glycol).  PMH include COPD, CAD, CABG, CHF, 2nd degree heart block, PPM, HTN, DM( was on Jardiance, metformin , Victoza, Lispro, Lantus PTA), CKD, CVA, BPH.    Factors impacting intake: [ ] none [ ] nausea  [ ] vomiting [ ] diarrhea [ ] constipation  [ ]chewing problems [x ] swallowing issues  [x ] other: on G-Tube feeding     Diet Prescription: NPO c Tube feeding, Glucerna 1.2 @ 60 ml/ht via G-Tube, Liquid protein supplement 1 pkg daily free water flush 30 ml/hr(01/09/23)    Intake: Glucerna 1.2 @ 60 mL/hr x 24 hrs=1440 ml, 1728 oscar, 86 gm pro, 1166 mL free water, 120% RDIs + Liquid protein supplement 1 pkg=15 grams protein, 100 calories + additional 720 ml water daily from water flush= total of 1886 ml water daily     Current Weight: ? wt. on 02/13, 112.4 kg, 02/08, 89 kg, 11/14/22, 82.2 kg  % Weight Change: unable to ascertain due to questionable wt.  02/12, no edema noted  Nutrition Focus physical not conducted @ present, pt c visible moderate temple, orbital, buccal depletion.    Pertinent Medications: MEDICATIONS  (STANDING):  apixaban 5 milliGRAM(s) Oral every 12 hours  aspirin  chewable 81 milliGRAM(s) Oral daily  atorvastatin 20 milliGRAM(s) Oral at bedtime  bacitracin   Ointment 1 Application(s) Topical two times a day  bacitracin   Ointment 1 Application(s) Topical two times a day  budesonide 160 MICROgram(s)/formoterol 4.5 MICROgram(s) Inhaler 2 Puff(s) Inhalation two times a day  chlorhexidine 2% Cloths 1 Application(s) Topical <User Schedule>  collagenase Ointment 1 Application(s) Topical two times a day  cyanocobalamin 1000 MICROGram(s) Oral daily  dextrose 5%. 1000 milliLiter(s) (50 mL/Hr) IV Continuous <Continuous>  dextrose 5%. 1000 milliLiter(s) (100 mL/Hr) IV Continuous <Continuous>  dextrose 50% Injectable 25 Gram(s) IV Push once  dextrose 50% Injectable 12.5 Gram(s) IV Push once  dextrose 50% Injectable 25 Gram(s) IV Push once  diphenhydrAMINE Injectable 50 milliGRAM(s) IV Push once  folic acid 1 milliGRAM(s) Oral daily  glucagon  Injectable 1 milliGRAM(s) IntraMuscular once  glycopyrrolate 1 milliGRAM(s) Oral two times a day  insulin glargine Injectable (LANTUS) 10 Unit(s) SubCutaneous at bedtime  insulin lispro (ADMELOG) corrective regimen sliding scale   SubCutaneous every 6 hours  levETIRAcetam 1500 milliGRAM(s) Oral two times a day  metoprolol tartrate 25 milliGRAM(s) Enteral Tube two times a day  polyethylene glycol 3350 17 Gram(s) Oral daily  scopolamine 1 mG/72 Hr(s) Patch 1 Patch Transdermal every 72 hours  senna 2 Tablet(s) Oral at bedtime  silver sulfADIAZINE 1% Cream 1 Application(s) Topical two times a day  valproic  acid Syrup 750 milliGRAM(s) Oral <User Schedule>  vitamin A &amp; D Ointment 1 Application(s) Topical two times a day    MEDICATIONS  (PRN):  acetaminophen     Tablet .. 650 milliGRAM(s) Oral every 6 hours PRN Temp greater or equal to 38C (100.4F), Mild Pain (1 - 3)  albuterol/ipratropium for Nebulization 3 milliLiter(s) Nebulizer every 6 hours PRN Shortness of Breath and/or Wheezing  aluminum hydroxide/magnesium hydroxide/simethicone Suspension 30 milliLiter(s) Oral every 4 hours PRN Dyspepsia  dextrose Oral Gel 15 Gram(s) Oral once PRN Blood Glucose LESS THAN 70 milliGRAM(s)/deciliter    Pertinent Labs: 02-15 Na141 mmol/L Glu 175 mg/dL<H> K+ 4.0 mmol/L Cr  0.74 mg/dL BUN 28 mg/dL<H> 02-15 Phos 3.2 mg/dL 02-10 Alb 2.0 g/dL<L>02-10 ALT 14 U/L AST 14 U/L<L> Alkaline Phosphatase 74 U/L  01-22-23 @ 06:42 a1c 5.9<H-improved since adm, c good glycemic control>  11/14/22, A1C=8.8% <H>  CAPILLARY BLOOD GLUCOSE      POCT Blood Glucose.: 177 mg/dL (15 Feb 2023 06:19)  POCT Blood Glucose.: 208 mg/dL (14 Feb 2023 22:41)  POCT Blood Glucose.: 179 mg/dL (14 Feb 2023 11:46)    Skin:   02/14; WDL except pressure ulcer  02/13; IAD  skin tears:   02/14, right buttock, 01/23, lower sacrum, 01/20, left temporal region   Pressure ulcer x 1  1. 02/14; sacrum; stage IV      Estimated Needs:   [x ] no change since previous assessment(11/06 & 11/23)  [ ] recalculated:     Previous Nutrition Diagnosis: (12/23)  [x ] Malnutrition; moderate malnutrition in context of acute illness   Related to: increased protein energy needs in setting of cardiac arrest, acute respiratory failure, s/p ELMER, shock liver, pressure ulcers/wounds  As evidenced by: physical findings of mild muscle wasting & mild/moderate fat depletion   GOAL: pt to meet >75% protein-energy needs via tube feeding; met   Nutrition Diagnosis is [ x] ongoing  [ ] resolved [ ] not applicable       New Nutrition Diagnosis: [ x] not applicable     Interventions:   Recommend  [ ] Change Diet To:  [ ] Nutrition Supplement  [x ] Nutrition Support: continue c current regimen  [x ] Other: swallow evaluation when pt is appropriate     Monitoring and Evaluation:   [ ] PO intake [ x ] Tolerance to diet prescription [ x ] weights [ x ] labs[ x ] follow up per protocol  [ ] other: Assessment:  Pt seen in 2C unit, able to speak, but c unclear speech, appeared uncomfortable, shaking movements noted, RN made aware.  G-Tube infusing c Glucerna 1.2 @ 60 ml/hr.  Pt adm c dx of hypoglycemia, cardiac arrest, hypothermia, s/p critical care stay, hospital course complicated by prolonged hypoxemic respiratory failure, acute/chronic respiratory failure, s/p ELMER, shock liver, COPD, s/p trach, s/p trach change to fenestrated tracheostomy 01/30, s/p capping at present as per RN, s/p PEG, DVT, multiple infections(enterococcal faecalis cellulitis; resolved, infected hand hematoma; s/p debridement 12/13/22, tracheobronchitis), tonic clonic seizure, occlusive left subclavian thrombosis, fever, anemia of chronic disease, functional quadriplegia, plan for RITA noted, stage IV decubiti; sacrum; s/p debridement 12/19/22, dysphagia, constipation(on senna and polyethylene glycol).  PMH include COPD, CAD, CABG, CHF, 2nd degree heart block, PPM, HTN, DM( was on Jardiance, metformin , Victoza, Lispro, Lantus PTA), CKD, CVA, BPH.    Factors impacting intake: [ ] none [ ] nausea  [ ] vomiting [ ] diarrhea [ ] constipation  [ ]chewing problems [x ] swallowing issues; 02/15, swallow evaluation c recommendation for NPO [x ] other: on G-Tube feeding     Diet Prescription: NPO c Tube feeding, Glucerna 1.2 @ 60 ml/ht via G-Tube, Liquid protein supplement 1 pkg daily free water flush 30 ml/hr(01/09/23)    Intake: Glucerna 1.2 @ 60 mL/hr x 24 hrs=1440 ml, 1728 oscar, 86 gm pro, 1166 mL free water, 120% RDIs + Liquid protein supplement 1 pkg=15 grams protein, 100 calories + additional 720 ml water daily from water flush= total of 1886 ml water daily     Current Weight: ? wt. on 02/13, 112.4 kg, 02/08, 89 kg, 11/14/22, 82.2 kg  % Weight Change: unable to ascertain due to questionable wt.  02/12, no edema noted  Nutrition Focus physical not conducted @ present, pt c visible moderate temple, orbital, buccal depletion.    Pertinent Medications: MEDICATIONS  (STANDING):  apixaban 5 milliGRAM(s) Oral every 12 hours  aspirin  chewable 81 milliGRAM(s) Oral daily  atorvastatin 20 milliGRAM(s) Oral at bedtime  bacitracin   Ointment 1 Application(s) Topical two times a day  bacitracin   Ointment 1 Application(s) Topical two times a day  budesonide 160 MICROgram(s)/formoterol 4.5 MICROgram(s) Inhaler 2 Puff(s) Inhalation two times a day  chlorhexidine 2% Cloths 1 Application(s) Topical <User Schedule>  collagenase Ointment 1 Application(s) Topical two times a day  cyanocobalamin 1000 MICROGram(s) Oral daily  dextrose 5%. 1000 milliLiter(s) (50 mL/Hr) IV Continuous <Continuous>  dextrose 5%. 1000 milliLiter(s) (100 mL/Hr) IV Continuous <Continuous>  dextrose 50% Injectable 25 Gram(s) IV Push once  dextrose 50% Injectable 12.5 Gram(s) IV Push once  dextrose 50% Injectable 25 Gram(s) IV Push once  diphenhydrAMINE Injectable 50 milliGRAM(s) IV Push once  folic acid 1 milliGRAM(s) Oral daily  glucagon  Injectable 1 milliGRAM(s) IntraMuscular once  glycopyrrolate 1 milliGRAM(s) Oral two times a day  insulin glargine Injectable (LANTUS) 10 Unit(s) SubCutaneous at bedtime  insulin lispro (ADMELOG) corrective regimen sliding scale   SubCutaneous every 6 hours  levETIRAcetam 1500 milliGRAM(s) Oral two times a day  metoprolol tartrate 25 milliGRAM(s) Enteral Tube two times a day  polyethylene glycol 3350 17 Gram(s) Oral daily  scopolamine 1 mG/72 Hr(s) Patch 1 Patch Transdermal every 72 hours  senna 2 Tablet(s) Oral at bedtime  silver sulfADIAZINE 1% Cream 1 Application(s) Topical two times a day  valproic  acid Syrup 750 milliGRAM(s) Oral <User Schedule>  vitamin A &amp; D Ointment 1 Application(s) Topical two times a day    MEDICATIONS  (PRN):  acetaminophen     Tablet .. 650 milliGRAM(s) Oral every 6 hours PRN Temp greater or equal to 38C (100.4F), Mild Pain (1 - 3)  albuterol/ipratropium for Nebulization 3 milliLiter(s) Nebulizer every 6 hours PRN Shortness of Breath and/or Wheezing  aluminum hydroxide/magnesium hydroxide/simethicone Suspension 30 milliLiter(s) Oral every 4 hours PRN Dyspepsia  dextrose Oral Gel 15 Gram(s) Oral once PRN Blood Glucose LESS THAN 70 milliGRAM(s)/deciliter    Pertinent Labs: 02-15 Na141 mmol/L Glu 175 mg/dL<H> K+ 4.0 mmol/L Cr  0.74 mg/dL BUN 28 mg/dL<H> 02-15 Phos 3.2 mg/dL 02-10 Alb 2.0 g/dL<L>02-10 ALT 14 U/L AST 14 U/L<L> Alkaline Phosphatase 74 U/L  01-22-23 @ 06:42 a1c 5.9<H-improved since adm, c good glycemic control>  11/14/22, A1C=8.8% <H>  CAPILLARY BLOOD GLUCOSE      POCT Blood Glucose.: 177 mg/dL (15 Feb 2023 06:19)  POCT Blood Glucose.: 208 mg/dL (14 Feb 2023 22:41)  POCT Blood Glucose.: 179 mg/dL (14 Feb 2023 11:46)    Skin:   02/14; WDL except pressure ulcer  02/13; IAD  skin tears:   02/14, right buttock, 01/23, lower sacrum, 01/20, left temporal region   Pressure ulcer x 1  1. 02/14; sacrum; stage IV      Estimated Needs:   [x ] no change since previous assessment(11/06 & 11/23)  [ ] recalculated:     Previous Nutrition Diagnosis: (12/23)  [x ] Malnutrition; moderate malnutrition in context of acute illness   Related to: increased protein energy needs in setting of cardiac arrest, acute respiratory failure, s/p ELMER, shock liver, pressure ulcers/wounds  As evidenced by: physical findings of mild muscle wasting & mild/moderate fat depletion   GOAL: pt to meet >75% protein-energy needs via tube feeding; met   Nutrition Diagnosis is [ x] ongoing  [ ] resolved [ ] not applicable       New Nutrition Diagnosis: [ x] not applicable     Interventions:   Recommend  [ ] Change Diet To:  [ ] Nutrition Supplement  [x ] Nutrition Support: continue c current regimen  [ ] Other:    Monitoring and Evaluation:   [ ] PO intake [ x ] Tolerance to diet prescription [ x ] weights [ x ] labs[ x ] follow up per protocol  [ ] other:

## 2023-02-15 NOTE — SPEAKING VALVE EVALUATION - COMMENTS
CXR1/30/2023FINDINGS: Tracheostomy tube projected in satisfactory position.Normal cardiac silhouette and normal pulmonary vasculature with no consolidation, effusion, pneumothorax or acute osseous finding.Pacemaker with wire tips in the right atrium, right ventricle and coronary sinus, unchanged.  IMPRESSION:  Tracheostomy tube projected in satisfactory position

## 2023-02-15 NOTE — PROGRESS NOTE ADULT - SUBJECTIVE AND OBJECTIVE BOX
BRANDON MÉNDEZL    [facility] 2[facility] [address]    Allergies    No Known Allergies    Intolerances        PAST MEDICAL & SURGICAL HISTORY:  HTN (hypertension)      HLD (hyperlipidemia)      Diabetes mellitus      Lacunar infarction      BPH (benign prostatic hyperplasia)      CHF (congestive heart failure)      Chronic obstructive pulmonary disease, unspecified COPD type      S/P CABG (coronary artery bypass graft)  2014          FAMILY HISTORY:      Home Medications:  aspirin 81 mg oral delayed release tablet: 1 tab(s) orally once a day (12 Jun 2020 17:35)  Liquifilm Tears preserved ophthalmic solution: 1 drop(s) to each affected eye 2 times a day (12 Jun 2020 17:35)      MEDICATIONS  (STANDING):  apixaban 5 milliGRAM(s) Oral every 12 hours  aspirin  chewable 81 milliGRAM(s) Oral daily  atorvastatin 20 milliGRAM(s) Oral at bedtime  bacitracin   Ointment 1 Application(s) Topical two times a day  bacitracin   Ointment 1 Application(s) Topical two times a day  budesonide 160 MICROgram(s)/formoterol 4.5 MICROgram(s) Inhaler 2 Puff(s) Inhalation two times a day  chlorhexidine 2% Cloths 1 Application(s) Topical <User Schedule>  collagenase Ointment 1 Application(s) Topical two times a day  cyanocobalamin 1000 MICROGram(s) Oral daily  dextrose 5%. 1000 milliLiter(s) (100 mL/Hr) IV Continuous <Continuous>  dextrose 5%. 1000 milliLiter(s) (50 mL/Hr) IV Continuous <Continuous>  dextrose 50% Injectable 25 Gram(s) IV Push once  dextrose 50% Injectable 12.5 Gram(s) IV Push once  dextrose 50% Injectable 25 Gram(s) IV Push once  diphenhydrAMINE Injectable 50 milliGRAM(s) IV Push once  folic acid 1 milliGRAM(s) Oral daily  glucagon  Injectable 1 milliGRAM(s) IntraMuscular once  glycopyrrolate 1 milliGRAM(s) Oral two times a day  insulin glargine Injectable (LANTUS) 10 Unit(s) SubCutaneous at bedtime  insulin lispro (ADMELOG) corrective regimen sliding scale   SubCutaneous every 6 hours  levETIRAcetam 1500 milliGRAM(s) Oral two times a day  metoprolol tartrate 25 milliGRAM(s) Enteral Tube two times a day  polyethylene glycol 3350 17 Gram(s) Oral daily  scopolamine 1 mG/72 Hr(s) Patch 1 Patch Transdermal every 72 hours  senna 2 Tablet(s) Oral at bedtime  silver sulfADIAZINE 1% Cream 1 Application(s) Topical two times a day  valproic  acid Syrup 750 milliGRAM(s) Oral <User Schedule>  vitamin A &amp; D Ointment 1 Application(s) Topical two times a day    MEDICATIONS  (PRN):  acetaminophen     Tablet .. 650 milliGRAM(s) Oral every 6 hours PRN Temp greater or equal to 38C (100.4F), Mild Pain (1 - 3)  albuterol/ipratropium for Nebulization 3 milliLiter(s) Nebulizer every 6 hours PRN Shortness of Breath and/or Wheezing  aluminum hydroxide/magnesium hydroxide/simethicone Suspension 30 milliLiter(s) Oral every 4 hours PRN Dyspepsia  dextrose Oral Gel 15 Gram(s) Oral once PRN Blood Glucose LESS THAN 70 milliGRAM(s)/deciliter      Diet, NPO with Tube Feed:   Tube Feeding Modality: Gastrostomy  Glucerna 1.2 Pedrito  Total Volume for 24 Hours (mL): 1440  Continuous  Until Goal Tube Feed Rate (mL per Hour): 60  Tube Feed Duration (in Hours): 24  Tube Feed Start Time: 14:30  Free Water Flush  Free Water Flush Instructions:  30 ml/hr via pump  Liquid Protein Supplement     Qty per Day:  1 (01-09-23 @ 13:45) [Active]          Vital Signs Last 24 Hrs  T(C): 36.5 (15 Feb 2023 05:07), Max: 36.7 (14 Feb 2023 17:14)  T(F): 97.7 (15 Feb 2023 05:07), Max: 98 (14 Feb 2023 17:14)  HR: 68 (15 Feb 2023 05:07) (65 - 90)  BP: 109/67 (15 Feb 2023 05:07) (109/67 - 132/82)  BP(mean): 87 (14 Feb 2023 17:14) (87 - 87)  RR: 18 (15 Feb 2023 05:07) (18 - 19)  SpO2: 96% (15 Feb 2023 05:07) (96% - 99%)    Parameters below as of 15 Feb 2023 05:07  Patient On (Oxygen Delivery Method): tracheostomy collar          02-14-23 @ 07:01  -  02-15-23 @ 07:00  --------------------------------------------------------  IN: 1080 mL / OUT: 0 mL / NET: 1080 mL              LABS:                    WBC:      MICROBIOLOGY:  RECENT CULTURES:                  Sodium:          Hemoglobin:      Platelets:             RADIOLOGY & ADDITIONAL STUDIES:      MICROBIOLOGY:  RECENT CULTURES:

## 2023-02-15 NOTE — PROGRESS NOTE ADULT - ASSESSMENT
REVIEW OF SYMPTOMS      Able to give (reliable) ROS  NO     PHYSICAL EXAM    HEENT Unremarkable  atraumatic   RESP Fair air entry EXP prolonged    Harsh breath sound Resp distres mild   CARDIAC S1 S2 No S3     NO JVD    ABDOMEN SOFT BS PRESENT NOT DISTENDED No hepatosplenomegaly   PEDAL EDEMA present No calf tenderness  NO rash       GENERAL DATA .   GOC.  12/11/2022 full code       ALLGY.  nka                            WT. ..  12/2/2022 86  BMI. ..    12/2/2022 29                          ICU STAY.  .. 11/29-12/9  COVID.   .. 12/2/2022 scv2 (-)   .. 11/20/2022 scv2 (-)   BEST PRACTICE ISSUES.    HOB ELEVATN. Yes  DVT PPLX.    .. 1/3/2023 apixa 5.2 (vte)    (DVT 12/12 l Subclav throm)  ALEJO PPLX.   INFN PPLX.   .. 11/14 chlorhex 2%   SP SW ANTWAN.         DIET.    ..  12/15 glucerna 1.2 1440 gt   IV fl.    PROCEDURES.  .. 2/15/2023 pmvaslve placd   .. 1/28  size 4 fenestrated cuffed trach placed by surgery     ABGS.  1/6/2023 ac 16/450/.3/5 746/49/123     VS/ PO/IO/ VENT/ DRIPS.  2/15/2023 afeb 78 120/70   2/15/2023 tc 98%     PATIENT PRESENTATION.  72 m doa 11/14/2022 cac    PMH  PMH CAD s/p PCI with stent 2015 and CABG 2014,   PMH  s/p medtronic PPM,   PMH CVA (lacunar infarct)    HOSPITAL COURSE   .. 1) PEA arrest with ROSC after approximately 5 min 11/14  Now communicative   .. 2) DVT 12/12 l Subclav thromS 12/15/2022 lvnx 80.2 1/3 changed to apixaban 5.2  .. 3) TRACH 12/5/2022 trach  .. 4) PEG12/5/2022 peg   .. 5) Cellulitis hand absc 12/13 mod enterococcus fecalis  staph epi 12/12-12/15/2022  cephalexin 12/15 vanco once  12/16-12/19 zosyn  .. 6) Vent weaning       PROBLEM ASSESSMENT RECOMMENDATIONS.  Trach 12/5   .. trach care   Trach change  ..  1/28 size 4 fenestrated cuffed trach  Trach wean.  .. 2/6/2023 pt still has lot of secretions  VTE  .. On apixaba  INFECTION.  .. w 1/17-1/18-1/20-1/21-2/3-2/8-2/10-2/15/2023      w 8.3- 7.9 - 6.8 - 7.2 - 8.8  - 10- 10.8- 10.8   .. pr 1/17-1/20/2023 (-)  (-)   .. 1/16-1/20/2023 Rocephin started by hospitalist dced   CAD.  .. 11/14 asa 81   .. 12/13 metoprolol 25.2   CHF   .. Cr 2/6/2023 Cr .7   .. 11/14/2022 echo mod decr segmental lvsf apical lateral apiccal ant segment are abn takotsubo cmpthy pasp 42 .  .. Monitor for chf has chr hfref per echo   COPD   .. 2/1 duoneb p   .. 12/30 symbicort 160   .. 1/7/2023 glycopyrrolate 1.2   .. 1/9/2023 ipratropium  .. 1/15/2023 scopolamine   .. continue bd ics for copd   Anemia.  .. Hb 1/12-1/14-1/19-1/20-7/21-1/25-2/3-2/8-2/10-2/15/2023       Hb 9.8- 10 -9.1- 9.8  - 9.2 - 9.5 -10- 10 - 10.5 -10.8  .. target hb 7 (+)  .. monitor   sp cac   .. good neuro recovery awake alert interactive   VTE  .. 1/3/2023 apixa 5.2 (vte)    FAMILY COMMUNICATION.  .. 2/12/2023 dw pt son  will ask speech eval for passey cordelia valve  .. 2/12/2023 Pt has a fenestrated trach and has fenestrated inner cannula which is available to place before trying passy cordelia valve     OVERALL   WEANED OFF VENT 1/23   TRACH WEANING    Will start capping when secretions decrease  2/14/2023 dw speech they will try PM valve with fenestrated cannula   PASSY CORDELIA VALVE 2/15/2023 placed pmv during day time   FLAILING OF LOWER EXTR   .. Jersks started 1/16   .. 1/19/2023 myoclonic jerks improvd on depakote       TIME SPENT   Over 25 minutes aggregate care time spent on encounter; activities included   direct patient care, counseling and/or coordinating care reviewing notes, lab data/ imaging , discussion with multidisciplinary team/ patient  /family and explaining in detail risks, benefits, alternatives  of the recommendations     BRANDON CAMARILLO

## 2023-02-15 NOTE — SWALLOW BEDSIDE ASSESSMENT ADULT - SWALLOW EVAL: DIAGNOSIS
oropharyngeal phases of swallow marked by reduced labial seal/stripping utensil, slightly increased bolus manipulation/formation time and transport posterior, +swallow trigger with hyolaryngeal elevation and multiple swallows. pt c/o pharyngeal stasis. suggest pt not safe for oral means nutrition/hydration/medication at this time. oropharyngeal phases of swallow marked by reduced labial seal/stripping utensil, slightly increased bolus manipulation/formation time and transport posterior, +swallow trigger with hyolaryngeal elevation and multiple swallows. pt c/o pharyngeal stasis. suggest pt not safe for oral means of nutrition/hydration/medication at this time.

## 2023-02-15 NOTE — SWALLOW BEDSIDE ASSESSMENT ADULT - NS SPL SWALLOW CLINIC TRIAL FT
addendum 2/16 puree trial tinged with green.  RT pt suctioned after po trials with no green tinged secretions noted.   observe trach site and suction material for green tinged secretions. addendum added 2/16 puree trial tinged with green.  RT suctioned pt after po trials with no green tinged secretions noted.   observe trach site and suction material for green tinged secretions. JULIUS finley

## 2023-02-15 NOTE — SPEAKING VALVE EVALUATION - SLP PERTINENT HISTORY OF CURRENT PROBLEM
pt admitted to ICU DX: cardiac arrest, respiratory arrest requiring intubation, acute hypercarbic respiratory failure, hypoglycemia, tonic clonic seizures post arrest, circulatory shock, takotsubo cardiomyopathy, acute on chronic renal failure with ATN, acute metabolic encephalopathy, rib fractures from compressions

## 2023-02-15 NOTE — PROGRESS NOTE ADULT - SUBJECTIVE AND OBJECTIVE BOX
Pt seen and examined at bedside. Laying comfortably in his bed.  Not in any acute distress.  Son Cain at bedside.  Vital Signs Last 24 Hrs  T(C): 36.4 (15 Feb 2023 10:51), Max: 36.7 (14 Feb 2023 17:14)  T(F): 97.6 (15 Feb 2023 10:51), Max: 98 (14 Feb 2023 17:14)  HR: 85 (15 Feb 2023 12:36) (68 - 90)  BP: 120/62 (15 Feb 2023 10:51) (109/67 - 127/71)  BP(mean): 87 (14 Feb 2023 17:14) (87 - 87)  RR: 18 (15 Feb 2023 12:36) (18 - 19)  SpO2: 98% (15 Feb 2023 12:36) (96% - 99%)    Parameters below as of 15 Feb 2023 12:36  Patient On (Oxygen Delivery Method): tracheostomy collar  O2 Flow (L/min): 5  O2 Concentration (%): 28.      Physical Exam:  GENERAL: stable, in bed, son at bedside, more alert today. PEG feeds in place.   NECK: supple, No JVD, Trach collar in place.  CHEST/LUNG: clear to auscultation bilaterally; no rales, rhonchi, or wheezing b/l  HEART: normal S1, S2  ABDOMEN: BS+, soft, ND, NT   EXTREMITIES:  pulses palpable; no clubbing, cyanosis, or edema b/l LEs        LABS:                        10.8   11.59 )-----------( 295      ( 15 Feb 2023 07:00 )             34.0     02-15    141  |  103  |  28<H>  ----------------------------<  175<H>  4.0   |  31  |  0.74    Ca    8.8      15 Feb 2023 07:00  Phos  3.2     02-15  Mg     2.2     02-15          MEDICATIONS  (STANDING):  apixaban 5 milliGRAM(s) Oral every 12 hours  aspirin  chewable 81 milliGRAM(s) Oral daily  atorvastatin 20 milliGRAM(s) Oral at bedtime  bacitracin   Ointment 1 Application(s) Topical two times a day  bacitracin   Ointment 1 Application(s) Topical two times a day  budesonide 160 MICROgram(s)/formoterol 4.5 MICROgram(s) Inhaler 2 Puff(s) Inhalation two times a day  chlorhexidine 2% Cloths 1 Application(s) Topical <User Schedule>  collagenase Ointment 1 Application(s) Topical two times a day  cyanocobalamin 1000 MICROGram(s) Oral daily  dextrose 5%. 1000 milliLiter(s) (50 mL/Hr) IV Continuous <Continuous>  dextrose 5%. 1000 milliLiter(s) (100 mL/Hr) IV Continuous <Continuous>  dextrose 50% Injectable 25 Gram(s) IV Push once  dextrose 50% Injectable 12.5 Gram(s) IV Push once  dextrose 50% Injectable 25 Gram(s) IV Push once  diphenhydrAMINE Injectable 50 milliGRAM(s) IV Push once  folic acid 1 milliGRAM(s) Oral daily  glucagon  Injectable 1 milliGRAM(s) IntraMuscular once  glycopyrrolate 1 milliGRAM(s) Oral two times a day  insulin glargine Injectable (LANTUS) 10 Unit(s) SubCutaneous at bedtime  insulin lispro (ADMELOG) corrective regimen sliding scale   SubCutaneous every 6 hours  levETIRAcetam 1500 milliGRAM(s) Oral two times a day  metoprolol tartrate 25 milliGRAM(s) Enteral Tube two times a day  polyethylene glycol 3350 17 Gram(s) Oral daily  scopolamine 1 mG/72 Hr(s) Patch 1 Patch Transdermal every 72 hours  senna 2 Tablet(s) Oral at bedtime  silver sulfADIAZINE 1% Cream 1 Application(s) Topical two times a day  valproic  acid Syrup 750 milliGRAM(s) Oral <User Schedule>  vitamin A &amp; D Ointment 1 Application(s) Topical two times a day      MEDICATIONS  (PRN):  acetaminophen     Tablet .. 650 milliGRAM(s) Oral every 6 hours PRN Temp greater or equal to 38C (100.4F), Mild Pain (1 - 3)  albuterol/ipratropium for Nebulization 3 milliLiter(s) Nebulizer every 6 hours PRN Shortness of Breath and/or Wheezing  aluminum hydroxide/magnesium hydroxide/simethicone Suspension 30 milliLiter(s) Oral every 4 hours PRN Dyspepsia  dextrose Oral Gel 15 Gram(s) Oral once PRN Blood Glucose LESS THAN 70 milliGRAM(s)/deciliter        Home Medications:  aspirin 81 mg oral delayed release tablet: 1 tab(s) orally once a day (12 Jun 2020 17:35)  Liquifilm Tears preserved ophthalmic solution: 1 drop(s) to each affected eye 2 times a day (12 Jun 2020 17:35)

## 2023-02-15 NOTE — SPEAKING VALVE EVALUATION - SLP GENERAL OBSERVATIONS
pt seen bedside, in telemetry GMF 2C alert and oriented x1-2 place with choice. pt on trach collar 28%, tracheostomy size 4, cuffed cuff deflated and RT reported minimal secretions. RT suctioned pt and cleaned inner cannula prior to placement of valve. passy cordelia speaking valve placed initial HR 93, Sp02 98. pt responded to questions, verbalized/wrote wants and was able to simple follow directions. pt able to get voice- low volume and noted fair articulation. pt tolerated PMV 2001 purple for 45 minutes and HR 85 and SpO2 98. pt seen bedside, in telemetry GMF 2C alert and oriented x1-2 place with choice. pt on trach collar 28%, tracheostomy size 4, cuffed cuff deflated and RT reported minimal secretions. RT suctioned pt and cleaned inner cannula prior to placement of valve. pt responded to questions and communicated wants via verbal and written and was able to simple follow directions. pt able to get voice with digital occlusion -dysphonia, low volume and noted fair articulation.

## 2023-02-15 NOTE — SPEAKING VALVE EVALUATION - RECOMMENDED SPEAKING VALVE GUIDELINES
Placement of speaking valve as tolerated/During waking hours only/Place on hub of tracheostomy.../Suction prior to placement

## 2023-02-15 NOTE — SWALLOW BEDSIDE ASSESSMENT ADULT - SLP GENERAL OBSERVATIONS
pt seen bedside, in 2C alert and oriented x1-2 with choice. pt on trach collar 28%, tracheostomy size 4 and RT reported minimal secretions. RT pt suctioned prior to po trials and cleaned inner cannula. passy cordelia speaking valve placed initial HR 93, Sp02 98. pt responded to questions, verbalized/wrote wants and was able to simple follow directions. pt able to get voice- low volume and noted fair articulation. after po trials HR 85 and SpO2 98, and pt tolerated PMV 2001 purple for 45 minutes. pt seen bedside, in 2C alert and oriented x1-2 with choice. pt on trach collar 28%, tracheostomy size 4, cuffed cuff deflated and RT reported minimal secretions. RT suctioned pt prior to po trials and cleaned inner cannula. passy cordelia speaking valve placed initial HR 93, Sp02 98. pt responded to questions, verbalized/wrote wants and was able to simple follow directions. pt able to get voice- low volume and noted fair articulation. pt tolerated PMV 2001 purple for 45 minutes and after po trials HR 85 and SpO2 98. pt seen bedside, in telemetry GMF 2C alert and oriented x1-2 place with choice. pt on trach collar 28%, tracheostomy size 4, cuffed cuff deflated and RT reported minimal secretions. RT suctioned pt prior to po trials and cleaned inner cannula. passy cordelia speaking valve placed initial HR 93, Sp02 98. pt responded to questions, verbalized/wrote wants and was able to simple follow directions. pt able to get voice- low volume and noted fair articulation. pt tolerated PMV 2001 purple for 45 minutes and after po trials HR 85 and SpO2 98.

## 2023-02-16 LAB
GLUCOSE BLDC GLUCOMTR-MCNC: 104 MG/DL — HIGH (ref 70–99)
GLUCOSE BLDC GLUCOMTR-MCNC: 138 MG/DL — HIGH (ref 70–99)
GLUCOSE BLDC GLUCOMTR-MCNC: 143 MG/DL — HIGH (ref 70–99)
GLUCOSE BLDC GLUCOMTR-MCNC: 149 MG/DL — HIGH (ref 70–99)
GLUCOSE BLDC GLUCOMTR-MCNC: 169 MG/DL — HIGH (ref 70–99)
GLUCOSE BLDC GLUCOMTR-MCNC: 200 MG/DL — HIGH (ref 70–99)
HCT VFR BLD CALC: 34.5 % — LOW (ref 39–50)
HGB BLD-MCNC: 10.9 G/DL — LOW (ref 13–17)
MCHC RBC-ENTMCNC: 30.2 PG — SIGNIFICANT CHANGE UP (ref 27–34)
MCHC RBC-ENTMCNC: 31.6 G/DL — LOW (ref 32–36)
MCV RBC AUTO: 95.6 FL — SIGNIFICANT CHANGE UP (ref 80–100)
NRBC # BLD: 0 /100 WBCS — SIGNIFICANT CHANGE UP (ref 0–0)
PLATELET # BLD AUTO: 284 K/UL — SIGNIFICANT CHANGE UP (ref 150–400)
RBC # BLD: 3.61 M/UL — LOW (ref 4.2–5.8)
RBC # FLD: 14.5 % — SIGNIFICANT CHANGE UP (ref 10.3–14.5)
WBC # BLD: 9.31 K/UL — SIGNIFICANT CHANGE UP (ref 3.8–10.5)
WBC # FLD AUTO: 9.31 K/UL — SIGNIFICANT CHANGE UP (ref 3.8–10.5)

## 2023-02-16 PROCEDURE — 99233 SBSQ HOSP IP/OBS HIGH 50: CPT

## 2023-02-16 PROCEDURE — 99232 SBSQ HOSP IP/OBS MODERATE 35: CPT

## 2023-02-16 PROCEDURE — 74230 X-RAY XM SWLNG FUNCJ C+: CPT | Mod: 26

## 2023-02-16 RX ADMIN — APIXABAN 5 MILLIGRAM(S): 2.5 TABLET, FILM COATED ORAL at 05:34

## 2023-02-16 RX ADMIN — Medication 1 APPLICATION(S): at 17:30

## 2023-02-16 RX ADMIN — SCOPALAMINE 1 PATCH: 1 PATCH, EXTENDED RELEASE TRANSDERMAL at 19:52

## 2023-02-16 RX ADMIN — Medication 1 APPLICATION(S): at 05:42

## 2023-02-16 RX ADMIN — POLYETHYLENE GLYCOL 3350 17 GRAM(S): 17 POWDER, FOR SOLUTION ORAL at 13:01

## 2023-02-16 RX ADMIN — Medication 1 APPLICATION(S): at 05:41

## 2023-02-16 RX ADMIN — PREGABALIN 1000 MICROGRAM(S): 225 CAPSULE ORAL at 13:01

## 2023-02-16 RX ADMIN — Medication 1: at 13:03

## 2023-02-16 RX ADMIN — Medication 1 APPLICATION(S): at 05:43

## 2023-02-16 RX ADMIN — ROBINUL 1 MILLIGRAM(S): 0.2 INJECTION INTRAMUSCULAR; INTRAVENOUS at 17:29

## 2023-02-16 RX ADMIN — Medication 1 APPLICATION(S): at 05:33

## 2023-02-16 RX ADMIN — APIXABAN 5 MILLIGRAM(S): 2.5 TABLET, FILM COATED ORAL at 17:29

## 2023-02-16 RX ADMIN — Medication 1 APPLICATION(S): at 05:32

## 2023-02-16 RX ADMIN — INSULIN GLARGINE 10 UNIT(S): 100 INJECTION, SOLUTION SUBCUTANEOUS at 21:48

## 2023-02-16 RX ADMIN — Medication 750 MILLIGRAM(S): at 21:48

## 2023-02-16 RX ADMIN — ROBINUL 1 MILLIGRAM(S): 0.2 INJECTION INTRAMUSCULAR; INTRAVENOUS at 05:34

## 2023-02-16 RX ADMIN — Medication 1 APPLICATION(S): at 17:35

## 2023-02-16 RX ADMIN — BUDESONIDE AND FORMOTEROL FUMARATE DIHYDRATE 2 PUFF(S): 160; 4.5 AEROSOL RESPIRATORY (INHALATION) at 21:47

## 2023-02-16 RX ADMIN — LEVETIRACETAM 1500 MILLIGRAM(S): 250 TABLET, FILM COATED ORAL at 17:29

## 2023-02-16 RX ADMIN — Medication 1 APPLICATION(S): at 17:31

## 2023-02-16 RX ADMIN — Medication 750 MILLIGRAM(S): at 08:48

## 2023-02-16 RX ADMIN — SCOPALAMINE 1 PATCH: 1 PATCH, EXTENDED RELEASE TRANSDERMAL at 07:32

## 2023-02-16 RX ADMIN — LEVETIRACETAM 1500 MILLIGRAM(S): 250 TABLET, FILM COATED ORAL at 05:33

## 2023-02-16 RX ADMIN — CHLORHEXIDINE GLUCONATE 1 APPLICATION(S): 213 SOLUTION TOPICAL at 05:32

## 2023-02-16 RX ADMIN — BUDESONIDE AND FORMOTEROL FUMARATE DIHYDRATE 2 PUFF(S): 160; 4.5 AEROSOL RESPIRATORY (INHALATION) at 05:33

## 2023-02-16 RX ADMIN — ATORVASTATIN CALCIUM 20 MILLIGRAM(S): 80 TABLET, FILM COATED ORAL at 21:49

## 2023-02-16 RX ADMIN — Medication 1: at 05:34

## 2023-02-16 RX ADMIN — Medication 25 MILLIGRAM(S): at 05:40

## 2023-02-16 RX ADMIN — Medication 1 MILLIGRAM(S): at 13:01

## 2023-02-16 RX ADMIN — Medication 25 MILLIGRAM(S): at 17:29

## 2023-02-16 RX ADMIN — Medication 81 MILLIGRAM(S): at 13:01

## 2023-02-16 NOTE — SWALLOW VFSS/MBS ASSESSMENT ADULT - SLP GENERAL OBSERVATIONS
pt seen sitting upright securely in the stretcher alert and oriented to self. pt on trach collar ventimask 4 liters and purple PMV speaking valve in place. pt able to follow directions for testing. speech intelligibility subjectively judged to be poor and noted difficulty producing voice.

## 2023-02-16 NOTE — SWALLOW VFSS/MBS ASSESSMENT ADULT - RECOMMENDED FEEDING/EATING TECHNIQUES
USE PMV speaking valve during po meals/allow for swallow between intakes/crush medication (when feasible)/maintain upright posture during/after eating for 30 mins/oral hygiene/provide rest periods between swallows/small sips/bites

## 2023-02-16 NOTE — PROGRESS NOTE ADULT - ASSESSMENT
REVIEW OF SYMPTOMS      Able to give (reliable) ROS  NO     PHYSICAL EXAM    HEENT Unremarkable  atraumatic   RESP Fair air entry EXP prolonged    Harsh breath sound Resp distres mild   CARDIAC S1 S2 No S3     NO JVD    ABDOMEN SOFT BS PRESENT NOT DISTENDED No hepatosplenomegaly   PEDAL EDEMA present No calf tenderness  NO rash       GENERAL DATA .   GOC.  12/11/2022 full code       ALLGY.  nka                            WT. ..  12/2/2022 86  BMI. ..    12/2/2022 29                          ICU STAY.  .. 11/29-12/9  COVID.   .. 12/2/2022 scv2 (-)   .. 11/20/2022 scv2 (-)   BEST PRACTICE ISSUES.    HOB ELEVATN. Yes  DVT PPLX.    .. 1/3/2023 apixa 5.2 (vte)    (DVT 12/12 l Subclav throm)  ALEJO PPLX.   INFN PPLX.   .. 11/14 chlorhex 2%   SP SW ANTWAN.         DIET.    ..  12/15 glucerna 1.2 1440 gt   IV fl.    PROCEDURES.  .. 2/15/2023 pmvaslve placd   .. 1/28  size 4 fenestrated cuffed trach placed by surgery     ABGS.  1/6/2023 ac 16/450/.3/5 746/49/123     VS/ PO/IO/ VENT/ DRIPS.  2/16/2023 afeb 80 110/70   2/16/2023 tc 99%     PATIENT PRESENTATION.  72 m doa 11/14/2022 cac    PMH  PMH CAD s/p PCI with stent 2015 and CABG 2014,   PMH  s/p medtronic PPM,   PMH CVA (lacunar infarct)    HOSPITAL COURSE   .. 1) PEA arrest with ROSC after approximately 5 min 11/14  Now communicative   .. 2) DVT 12/12 l Subclav thromS 12/15/2022 lvnx 80.2 1/3 changed to apixaban 5.2  .. 3) TRACH 12/5/2022 trach  .. 4) PEG12/5/2022 peg   .. 5) Cellulitis hand absc 12/13 mod enterococcus fecalis  staph epi 12/12-12/15/2022  cephalexin 12/15 vanco once  12/16-12/19 zosyn  .. 6) Vent weaning       PROBLEM ASSESSMENT RECOMMENDATIONS.  Trach 12/5   .. trach care   Trach change  ..  1/28 size 4 fenestrated cuffed trach  Trach wean.  .. 2/6/2023 pt still has lot of secretions  VTE  .. On apixaba  INFECTION.  .. w 1/17-1/18-1/20-1/21-2/3-2/8-2/10-2/15/2023      w 8.3- 7.9 - 6.8 - 7.2 - 8.8  - 10- 10.8- 10.8   .. pr 1/17-1/20/2023 (-)  (-)   .. 1/16-1/20/2023 Rocephin started by hospitalist dced   CAD.  .. 11/14 asa 81   .. 12/13 metoprolol 25.2   CHF   .. Cr 2/6/2023 Cr .7   .. 11/14/2022 echo mod decr segmental lvsf apical lateral apiccal ant segment are abn takotsubo cmpthy pasp 42 .  .. Monitor for chf has chr hfref per echo   COPD   .. 2/1 duoneb p   .. 12/30 symbicort 160   .. 1/7/2023 glycopyrrolate 1.2   .. 1/9/2023 ipratropium  .. 1/15/2023 scopolamine   .. continue bd ics for copd   Anemia.  .. Hb 1/12-1/14-1/19-1/20-7/21-1/25-2/3-2/8-2/10-2/15/2023       Hb 9.8- 10 -9.1- 9.8  - 9.2 - 9.5 -10- 10 - 10.5 -10.8  .. target hb 7 (+)  .. monitor   sp cac   .. good neuro recovery awake alert interactive   VTE  .. 1/3/2023 apixa 5.2 (vte)    FAMILY COMMUNICATION.  .. 2/12/2023 dw pt son  will ask speech eval for passey cordelia valve  .. 2/12/2023 Pt has a fenestrated trach and has fenestrated inner cannula which is available to place before trying passy cordelia valve     OVERALL   WEANED OFF VENT 1/23   TRACH WEANING    Will start capping when secretions decrease  2/14/2023 dw speech they will try PM valve with fenestrated cannula   2/16/2023 tolerating pm valve  PASSY CORDELIA VALVE 2/15/2023 placed pmv during day time  REHAB 2/16/2023 pt eval requested   FLAILING OF LOWER EXTR   .. Jersks started 1/16   .. 1/19/2023 myoclonic jerks improvd on depakote       TIME SPENT   Over 25 minutes aggregate care time spent on encounter; activities included   direct patient care, counseling and/or coordinating care reviewing notes, lab data/ imaging , discussion with multidisciplinary team/ patient  /family and explaining in detail risks, benefits, alternatives  of the recommendations     BRANDON CAMARILLO

## 2023-02-16 NOTE — SWALLOW VFSS/MBS ASSESSMENT ADULT - SLP PERTINENT HISTORY OF CURRENT PROBLEM
pt admitted pt admitted to ICU DX: cardiac arrest, respiratory arrest requiring intubation, acute hypercarbic respiratory failure, hypoglycemia, tonic clonic seizures post arrest, circulatory shock, takotsubo cardiomyopathy, acute on chronic renal failure with ATN, acute metabolic encephalopathy, rib fractures from compressions. s/p trach/PEG 12/5. pt transferred to [facility] 12/8, he is tolerating trach collar and use of PMV speaking valve during the day. clinical swallow eval completed 2/15, at which time NPO cont PEGtube feeding and objective testing was recommended. see report for details pt admitted to ICU DX: cardiac arrest, respiratory arrest requiring intubation, acute hypercarbic respiratory failure, hypoglycemia, tonic clonic seizures post arrest, circulatory shock, takotsubo cardiomyopathy, acute on chronic renal failure with ATN, acute metabolic encephalopathy, rib fractures from compressions. s/p trach/PEG 12/5. pt transferred to [facility] 12/8. pt weaned from vent, trach downsized from size 6 cuffed to size 4 cuffless. pt is tolerating trach collar and use of PMV speaking valve during the day. clinical swallow eval completed 2/15, at which time NPO cont PEGtube feeding and objective testing was recommended. see report for details

## 2023-02-16 NOTE — PROGRESS NOTE ADULT - ASSESSMENT
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 ACLS for PEA arrest and ROSC returned after 5 minutes. Cardiac arrest possibly secondary to severe hypoglycemia from eating less and not tracking blood sugar carefully. Hospital course complicated by 1) prolonged hypoxemic respiratory failure. difficult extubation requiring s/p trach and peg 2) left upper extremity/left subclavian DVT and placed on eliquis 3) multiple infections (Enterococcal faecalis cellulitis, infected left hand hematoma, tracheobronchitis secondary to citrobacter 4) tonic clonic seizure - on keppra/depakoate- currently undergoing reevaluation by NEUROLOGY 5) fever of 102 on 1/19. INFECTIOUS DISEASE reconsulted and pan culture ordered.    # Acute/Chronic Respiratory failure w/ Hypoxia and Hypercapnia/ COPD, now trach-vent dependent.  - s/p intubation; failed extubation requiring s/p trach and peg   - PMV (Passy Criders Valve) placed on 02/15/2023.  - tolerating Trach collar, vent discontinued; continue weaning as per Pulmonary   - cont w/ Symbicort, Duonebs prn  - Surgery on board, s/p Trach change to #4 fenestrated tracheostomy placed 1/30. Replaced on 02/14/2023.  - Capping trial per pulmonary this week.    - Pulmonary following.    # S/p Cardiac Arrest, acute metabolic encephalopathy due to severe hypoglycemia.  - s/p ACLS w/ ROSC after 5 min   - 2nd degree heart block, s/p PPM in place.  - ECHO Takotsubo cardiomyopathy. EF 50-55%, LVH, mild pHTN    - s/p ICU course; Hemodynamically stable now  - cont w/ Metoprolol, ASA and atorvastatin.    # Myoclonic Seizure   - EEG: Myoclonic seizure  - Neurology on board   - cont w/ Keppra Depakote.  - Myoclonic episode to LLE and LUE noted by his son.  - Will get Keppra and Depakote levels.    # Multiple infectious Disease   - tracheo-bronchitis secondary to citrobacter?? - resolved.  - enterococcal faecalis cellulitis - resolved   - infected left hand hematoma - s/p bedside debridement 12/13 w/ hand surgery   - monitor off abx              # Occlusive Left subclavian thrombosis   - LUE venous duplex : occlusive thrombus in the left mid subclavian vein with partial slow flow detected in the lateral subclavian vein.  - LUE arterial Duplex neg   - cont w/ Eliquis     # DM2   - A1c 8.8%  - cont w/ Lantus   - cont w/ FS monitoring w/ insulin s/s coverage     # HTN, Cad s/p PCI w/ stent/ CABG and HPL.  - cont with ASA, Metoprolol and Atorvastatin     # Anemia of Chronic Disease  - h/h stable, will cont  to monitor   - Hb 10.9, stable.    # Stage 4 decubiti sacrum wound  - s/p debridement 12/19  -cont w/ wound care as recommended     # Dysphagia/ Moderate Protein Calorie Malnutrition  - Peg tube in place   - continue with tube feeding at goal as tolerated.     # Constipation  - cont w/ senna, Miralax prn     DVT prophylaxis: Eliquis   Disposition: Patient has no insurance and Family in process of getting guardianship. Patient will then need placement.  CM/SW aware.    D/w his son Mr. Barlow at bedside, and he verbalized understanding.

## 2023-02-16 NOTE — SWALLOW VFSS/MBS ASSESSMENT ADULT - COMMENTS
CXR 1/30/2023INTERPRETATION:  Clinical history: 72-year-old male, status post tracheostomy change.Portable, rotated view of the chest is correlated with the chest CT of 1/13/2023.FINDINGS: Tracheostomy tube projected in satisfactory position.  Normal cardiac silhouette and normal pulmonary vasculature with no consolidation, effusion, pneumothorax or acute osseous finding.  Pacemaker with wire tips in the right atrium, right ventricle and coronary sinus, unchanged.  IMPRESSION:Tracheostomy tube projected in satisfactory position

## 2023-02-16 NOTE — SWALLOW VFSS/MBS ASSESSMENT ADULT - ORAL PHASE
Delayed oral transit time/Residue in oral cavity Residue in oral cavity within functional limits/Uncontrolled bolus / spillover in hypopharynx Residue in oral cavity/Uncontrolled bolus / spillover in hypopharynx

## 2023-02-16 NOTE — PROGRESS NOTE ADULT - SUBJECTIVE AND OBJECTIVE BOX
Laying comfortably in his bed.  Afebrile.  Not in any acute distress.  Denies any complaints.  Vital Signs Last 24 Hrs  T(C): 37 (16 Feb 2023 12:24), Max: 37 (16 Feb 2023 12:24)  T(F): 98.6 (16 Feb 2023 12:24), Max: 98.6 (16 Feb 2023 12:24)  HR: 74 (16 Feb 2023 12:45) (74 - 100)  BP: 110/78 (16 Feb 2023 12:24) (110/78 - 132/78)  BP(mean): --  RR: 18 (16 Feb 2023 12:45) (17 - 20)  SpO2: 99% (16 Feb 2023 12:45) (98% - 100%)    Parameters below as of 16 Feb 2023 12:45  Patient On (Oxygen Delivery Method): tracheostomy collar  O2 Flow (L/min): 5  O2 Concentration (%): 28Son Mr. Barlow at bedside.  His son has noted some myoclonic jerks in his LLE and RUE.          Physical Exam:  GENERAL: stable, in bed, son at bedside, more alert today. PEG feeds in place.   NECK: supple, No JVD, Trach collar in place. PMV was placed on 02/15/2023.  CHEST/LUNG: clear to auscultation bilaterally; no rales, rhonchi, or wheezing b/l  HEART: normal S1, S2  ABDOMEN: BS+, soft, ND, NT   EXTREMITIES:  pulses palpable; no clubbing, cyanosis, or edema b/l LEs          LABS:                        10.9   9.31  )-----------( 284      ( 16 Feb 2023 05:30 )             34.5     02-15    141  |  103  |  28<H>  ----------------------------<  175<H>  4.0   |  31  |  0.74    Ca    8.8      15 Feb 2023 07:00  Phos  3.2     02-15  Mg     2.2     02-15        MEDICATIONS  (STANDING):  apixaban 5 milliGRAM(s) Oral every 12 hours  aspirin  chewable 81 milliGRAM(s) Oral daily  atorvastatin 20 milliGRAM(s) Oral at bedtime  bacitracin   Ointment 1 Application(s) Topical two times a day  bacitracin   Ointment 1 Application(s) Topical two times a day  budesonide 160 MICROgram(s)/formoterol 4.5 MICROgram(s) Inhaler 2 Puff(s) Inhalation two times a day  chlorhexidine 2% Cloths 1 Application(s) Topical <User Schedule>  collagenase Ointment 1 Application(s) Topical two times a day  cyanocobalamin 1000 MICROGram(s) Oral daily  dextrose 5%. 1000 milliLiter(s) (100 mL/Hr) IV Continuous <Continuous>  dextrose 5%. 1000 milliLiter(s) (50 mL/Hr) IV Continuous <Continuous>  dextrose 50% Injectable 25 Gram(s) IV Push once  dextrose 50% Injectable 12.5 Gram(s) IV Push once  dextrose 50% Injectable 25 Gram(s) IV Push once  diphenhydrAMINE Injectable 50 milliGRAM(s) IV Push once  folic acid 1 milliGRAM(s) Oral daily  glucagon  Injectable 1 milliGRAM(s) IntraMuscular once  glycopyrrolate 1 milliGRAM(s) Oral two times a day  insulin glargine Injectable (LANTUS) 10 Unit(s) SubCutaneous at bedtime  insulin lispro (ADMELOG) corrective regimen sliding scale   SubCutaneous every 6 hours  levETIRAcetam 1500 milliGRAM(s) Oral two times a day  metoprolol tartrate 25 milliGRAM(s) Enteral Tube two times a day  polyethylene glycol 3350 17 Gram(s) Oral daily  scopolamine 1 mG/72 Hr(s) Patch 1 Patch Transdermal every 72 hours  senna 2 Tablet(s) Oral at bedtime  silver sulfADIAZINE 1% Cream 1 Application(s) Topical two times a day  valproic  acid Syrup 750 milliGRAM(s) Oral <User Schedule>  vitamin A &amp; D Ointment 1 Application(s) Topical two times a day        MEDICATIONS  (PRN):  acetaminophen     Tablet .. 650 milliGRAM(s) Oral every 6 hours PRN Temp greater or equal to 38C (100.4F), Mild Pain (1 - 3)  albuterol/ipratropium for Nebulization 3 milliLiter(s) Nebulizer every 6 hours PRN Shortness of Breath and/or Wheezing  aluminum hydroxide/magnesium hydroxide/simethicone Suspension 30 milliLiter(s) Oral every 4 hours PRN Dyspepsia  dextrose Oral Gel 15 Gram(s) Oral once PRN Blood Glucose LESS THAN 70 milliGRAM(s)/deciliter        Home Medications:  aspirin 81 mg oral delayed release tablet: 1 tab(s) orally once a day (12 Jun 2020 17:35)  Liquifilm Tears preserved ophthalmic solution: 1 drop(s) to each affected eye 2 times a day (12 Jun 2020 17:35)          < from: Xray Cinesophagram Swallow Function w/ Contrast (02.16.23 @ 09:33) >    IMPRESSION: No penetration or aspiration. Please refer tospeech and   swallowing report for further detail regarding the exam.    --- End of Report ---

## 2023-02-16 NOTE — SWALLOW VFSS/MBS ASSESSMENT ADULT - DIAGNOSTIC IMPRESSIONS
oropharyngeal dysphagia marked by decreased labial/lingual ROM/strength, slightly decreased bolus manipulation/formation and transport posterior causing trace to mild oral residue- at times oral holding. mild premature spill into hypopharynx with passive overflow to the PFS with thin liquid. intermittent slow pharyngeal transit with puree/solid(increased with solid). mild to moderate delay in swallow trigger (increased with solid), reduced BOT retraction/pharyngeal squeeze causing residue in the valleculae and decreased hyolaryngeal elevation/CP opening causing residue in the PFS- increased with solid. residue mostly cleared with second swallow independently initiated by patient. no laryngeal penetration or aspiration noted during study. oropharyngeal dysphagia marked by decreased labial/lingual ROM/strength, decreased bolus manipulation/formation and transport posterior causing trace to mild oral residue- at times oral holding. mild premature spill into hypopharynx with passive overflow to the PFS for thin liquid. intermittent slow pharyngeal transit with puree/solid(increased with solid). mild to moderate delay in swallow trigger (increased with solid), reduced BOT retraction/pharyngeal squeeze causing residue in the valleculae and decreased hyolaryngeal elevation/CP opening causing residue in the PFS- increased with solid. residue mostly cleared with second swallow independently initiated by patient. no laryngeal penetration or aspiration noted during study.

## 2023-02-16 NOTE — SWALLOW VFSS/MBS ASSESSMENT ADULT - H & P REVIEW
BIBEMS after son returned home from work to find pt unresponsive with noted blood in mouth and sonorous breathing. Unknown how long pt unresponsive for at home. Blood sugar in the 40s with EMS s/p glucagon. On arrival to ER pt hypothermic and agonally breathing. Pt became unresponsive with loss of pulse; ACLS initiated. PEA arrest with ROSC after approximately 5 min s/p Epi x2.  Pt intubated during CODE BLUE/yes

## 2023-02-17 LAB
GLUCOSE BLDC GLUCOMTR-MCNC: 130 MG/DL — HIGH (ref 70–99)
GLUCOSE BLDC GLUCOMTR-MCNC: 164 MG/DL — HIGH (ref 70–99)
GLUCOSE BLDC GLUCOMTR-MCNC: 179 MG/DL — HIGH (ref 70–99)
GLUCOSE BLDC GLUCOMTR-MCNC: 193 MG/DL — HIGH (ref 70–99)
GLUCOSE BLDC GLUCOMTR-MCNC: 199 MG/DL — HIGH (ref 70–99)
MAGNESIUM SERPL-MCNC: 2.3 MG/DL — SIGNIFICANT CHANGE UP (ref 1.6–2.6)
POTASSIUM SERPL-MCNC: 4.1 MMOL/L — SIGNIFICANT CHANGE UP (ref 3.5–5.3)
POTASSIUM SERPL-SCNC: 4.1 MMOL/L — SIGNIFICANT CHANGE UP (ref 3.5–5.3)
VALPROATE SERPL-MCNC: 55 UG/ML — SIGNIFICANT CHANGE UP (ref 50–100)

## 2023-02-17 PROCEDURE — 99233 SBSQ HOSP IP/OBS HIGH 50: CPT

## 2023-02-17 PROCEDURE — 99232 SBSQ HOSP IP/OBS MODERATE 35: CPT

## 2023-02-17 RX ORDER — VALPROIC ACID (AS SODIUM SALT) 250 MG/5ML
750 SOLUTION, ORAL ORAL
Refills: 0 | Status: DISCONTINUED | OUTPATIENT
Start: 2023-02-17 | End: 2023-02-17

## 2023-02-17 RX ORDER — VALPROIC ACID (AS SODIUM SALT) 250 MG/5ML
1000 SOLUTION, ORAL ORAL
Refills: 0 | Status: DISCONTINUED | OUTPATIENT
Start: 2023-02-17 | End: 2023-02-17

## 2023-02-17 RX ORDER — VALPROIC ACID (AS SODIUM SALT) 250 MG/5ML
1000 SOLUTION, ORAL ORAL
Refills: 0 | Status: DISCONTINUED | OUTPATIENT
Start: 2023-02-17 | End: 2023-03-18

## 2023-02-17 RX ORDER — LEVETIRACETAM 250 MG/1
1500 TABLET, FILM COATED ORAL
Refills: 0 | Status: DISCONTINUED | OUTPATIENT
Start: 2023-02-17 | End: 2023-03-18

## 2023-02-17 RX ORDER — VALPROIC ACID (AS SODIUM SALT) 250 MG/5ML
750 SOLUTION, ORAL ORAL
Refills: 0 | Status: DISCONTINUED | OUTPATIENT
Start: 2023-02-18 | End: 2023-03-18

## 2023-02-17 RX ADMIN — INSULIN GLARGINE 10 UNIT(S): 100 INJECTION, SOLUTION SUBCUTANEOUS at 21:16

## 2023-02-17 RX ADMIN — Medication 1 APPLICATION(S): at 06:06

## 2023-02-17 RX ADMIN — BUDESONIDE AND FORMOTEROL FUMARATE DIHYDRATE 2 PUFF(S): 160; 4.5 AEROSOL RESPIRATORY (INHALATION) at 06:08

## 2023-02-17 RX ADMIN — Medication 1000 MILLIGRAM(S): at 21:17

## 2023-02-17 RX ADMIN — LEVETIRACETAM 1500 MILLIGRAM(S): 250 TABLET, FILM COATED ORAL at 06:09

## 2023-02-17 RX ADMIN — SCOPALAMINE 1 PATCH: 1 PATCH, EXTENDED RELEASE TRANSDERMAL at 23:23

## 2023-02-17 RX ADMIN — Medication 1 APPLICATION(S): at 06:15

## 2023-02-17 RX ADMIN — APIXABAN 5 MILLIGRAM(S): 2.5 TABLET, FILM COATED ORAL at 17:28

## 2023-02-17 RX ADMIN — Medication 1 APPLICATION(S): at 06:14

## 2023-02-17 RX ADMIN — Medication 81 MILLIGRAM(S): at 11:28

## 2023-02-17 RX ADMIN — Medication 1 APPLICATION(S): at 06:09

## 2023-02-17 RX ADMIN — BUDESONIDE AND FORMOTEROL FUMARATE DIHYDRATE 2 PUFF(S): 160; 4.5 AEROSOL RESPIRATORY (INHALATION) at 17:28

## 2023-02-17 RX ADMIN — PREGABALIN 1000 MICROGRAM(S): 225 CAPSULE ORAL at 11:28

## 2023-02-17 RX ADMIN — POLYETHYLENE GLYCOL 3350 17 GRAM(S): 17 POWDER, FOR SOLUTION ORAL at 11:29

## 2023-02-17 RX ADMIN — APIXABAN 5 MILLIGRAM(S): 2.5 TABLET, FILM COATED ORAL at 06:09

## 2023-02-17 RX ADMIN — Medication 1 APPLICATION(S): at 17:29

## 2023-02-17 RX ADMIN — Medication 25 MILLIGRAM(S): at 17:30

## 2023-02-17 RX ADMIN — CHLORHEXIDINE GLUCONATE 1 APPLICATION(S): 213 SOLUTION TOPICAL at 06:10

## 2023-02-17 RX ADMIN — Medication 1 MILLIGRAM(S): at 11:28

## 2023-02-17 RX ADMIN — ROBINUL 1 MILLIGRAM(S): 0.2 INJECTION INTRAMUSCULAR; INTRAVENOUS at 06:09

## 2023-02-17 RX ADMIN — Medication 1 APPLICATION(S): at 17:31

## 2023-02-17 RX ADMIN — Medication 750 MILLIGRAM(S): at 08:32

## 2023-02-17 RX ADMIN — Medication 1: at 05:58

## 2023-02-17 RX ADMIN — SCOPALAMINE 1 PATCH: 1 PATCH, EXTENDED RELEASE TRANSDERMAL at 07:53

## 2023-02-17 RX ADMIN — Medication 25 MILLIGRAM(S): at 06:09

## 2023-02-17 RX ADMIN — LEVETIRACETAM 1500 MILLIGRAM(S): 250 TABLET, FILM COATED ORAL at 17:30

## 2023-02-17 RX ADMIN — ATORVASTATIN CALCIUM 20 MILLIGRAM(S): 80 TABLET, FILM COATED ORAL at 21:17

## 2023-02-17 RX ADMIN — ROBINUL 1 MILLIGRAM(S): 0.2 INJECTION INTRAMUSCULAR; INTRAVENOUS at 17:28

## 2023-02-17 RX ADMIN — Medication 1: at 11:28

## 2023-02-17 NOTE — PROGRESS NOTE ADULT - SUBJECTIVE AND OBJECTIVE BOX
Doing Okay.  Has some whitish secretion per RN at bedside.  Afebrile.  No any acute overnight issue.        Physical Exam:  GENERAL: stable, in bed, more alert today. PEG feeds in place.   NECK: supple, No JVD, Trach collar in place. PMV was placed on 02/15/2023.  CHEST/LUNG: clear to auscultation bilaterally; few b/l rhonchi, or wheezing present.  HEART: normal S1, S2  ABDOMEN: BS+, soft, ND, NT   EXTREMITIES:  pulses palpable; no clubbing, cyanosis, or edema b/l LEs          LABS:                        10.9   9.31  )-----------( 284      ( 16 Feb 2023 05:30 )             34.5     02-17    x   |  x   |  x   ----------------------------<  x   4.1   |  x   |  x     Mg     2.3     02-17                MEDICATIONS  (STANDING):  apixaban 5 milliGRAM(s) Oral every 12 hours  aspirin  chewable 81 milliGRAM(s) Oral daily  atorvastatin 20 milliGRAM(s) Oral at bedtime  bacitracin   Ointment 1 Application(s) Topical two times a day  bacitracin   Ointment 1 Application(s) Topical two times a day  budesonide 160 MICROgram(s)/formoterol 4.5 MICROgram(s) Inhaler 2 Puff(s) Inhalation two times a day  chlorhexidine 2% Cloths 1 Application(s) Topical <User Schedule>  collagenase Ointment 1 Application(s) Topical two times a day  cyanocobalamin 1000 MICROGram(s) Oral daily  dextrose 5%. 1000 milliLiter(s) (100 mL/Hr) IV Continuous <Continuous>  dextrose 5%. 1000 milliLiter(s) (50 mL/Hr) IV Continuous <Continuous>  dextrose 50% Injectable 25 Gram(s) IV Push once  dextrose 50% Injectable 12.5 Gram(s) IV Push once  dextrose 50% Injectable 25 Gram(s) IV Push once  diphenhydrAMINE Injectable 50 milliGRAM(s) IV Push once  folic acid 1 milliGRAM(s) Oral daily  glucagon  Injectable 1 milliGRAM(s) IntraMuscular once  glycopyrrolate 1 milliGRAM(s) Oral two times a day  insulin glargine Injectable (LANTUS) 10 Unit(s) SubCutaneous at bedtime  insulin lispro (ADMELOG) corrective regimen sliding scale   SubCutaneous every 6 hours  levETIRAcetam 1500 milliGRAM(s) Oral two times a day  metoprolol tartrate 25 milliGRAM(s) Enteral Tube two times a day  polyethylene glycol 3350 17 Gram(s) Oral daily  scopolamine 1 mG/72 Hr(s) Patch 1 Patch Transdermal every 72 hours  senna 2 Tablet(s) Oral at bedtime  silver sulfADIAZINE 1% Cream 1 Application(s) Topical two times a day  valproic  acid Syrup 750 milliGRAM(s) Oral <User Schedule>  vitamin A &amp; D Ointment 1 Application(s) Topical two times a day        MEDICATIONS  (PRN):  acetaminophen     Tablet .. 650 milliGRAM(s) Oral every 6 hours PRN Temp greater or equal to 38C (100.4F), Mild Pain (1 - 3)  albuterol/ipratropium for Nebulization 3 milliLiter(s) Nebulizer every 6 hours PRN Shortness of Breath and/or Wheezing  aluminum hydroxide/magnesium hydroxide/simethicone Suspension 30 milliLiter(s) Oral every 4 hours PRN Dyspepsia  dextrose Oral Gel 15 Gram(s) Oral once PRN Blood Glucose LESS THAN 70 milliGRAM(s)/deciliter        Home Medications:  aspirin 81 mg oral delayed release tablet: 1 tab(s) orally once a day (12 Jun 2020 17:35)  Liquifilm Tears preserved ophthalmic solution: 1 drop(s) to each affected eye 2 times a day (12 Jun 2020 17:35)          < from: Xray Cinesophagram Swallow Function w/ Contrast (02.16.23 @ 09:33) >    IMPRESSION: No penetration or aspiration. Please refer to speech and   swallowing report for further detail regarding the exam.    --- End of Report ---

## 2023-02-17 NOTE — PROGRESS NOTE ADULT - SUBJECTIVE AND OBJECTIVE BOX
BRANDON MÉNDEZL    [facility] [address]    Allergies    No Known Allergies    Intolerances        PAST MEDICAL & SURGICAL HISTORY:  HTN (hypertension)      HLD (hyperlipidemia)      Diabetes mellitus      Lacunar infarction      BPH (benign prostatic hyperplasia)      CHF (congestive heart failure)      Chronic obstructive pulmonary disease, unspecified COPD type      S/P CABG (coronary artery bypass graft)  2014          FAMILY HISTORY:      Home Medications:  aspirin 81 mg oral delayed release tablet: 1 tab(s) orally once a day (12 Jun 2020 17:35)  Liquifilm Tears preserved ophthalmic solution: 1 drop(s) to each affected eye 2 times a day (12 Jun 2020 17:35)      MEDICATIONS  (STANDING):  apixaban 5 milliGRAM(s) Oral every 12 hours  aspirin  chewable 81 milliGRAM(s) Oral daily  atorvastatin 20 milliGRAM(s) Oral at bedtime  bacitracin   Ointment 1 Application(s) Topical two times a day  bacitracin   Ointment 1 Application(s) Topical two times a day  budesonide 160 MICROgram(s)/formoterol 4.5 MICROgram(s) Inhaler 2 Puff(s) Inhalation two times a day  chlorhexidine 2% Cloths 1 Application(s) Topical <User Schedule>  collagenase Ointment 1 Application(s) Topical two times a day  cyanocobalamin 1000 MICROGram(s) Oral daily  dextrose 5%. 1000 milliLiter(s) (50 mL/Hr) IV Continuous <Continuous>  dextrose 5%. 1000 milliLiter(s) (100 mL/Hr) IV Continuous <Continuous>  dextrose 50% Injectable 25 Gram(s) IV Push once  dextrose 50% Injectable 12.5 Gram(s) IV Push once  dextrose 50% Injectable 25 Gram(s) IV Push once  diphenhydrAMINE Injectable 50 milliGRAM(s) IV Push once  folic acid 1 milliGRAM(s) Oral daily  glucagon  Injectable 1 milliGRAM(s) IntraMuscular once  glycopyrrolate 1 milliGRAM(s) Oral two times a day  insulin glargine Injectable (LANTUS) 10 Unit(s) SubCutaneous at bedtime  insulin lispro (ADMELOG) corrective regimen sliding scale   SubCutaneous every 6 hours  levETIRAcetam 1500 milliGRAM(s) Oral two times a day  metoprolol tartrate 25 milliGRAM(s) Enteral Tube two times a day  polyethylene glycol 3350 17 Gram(s) Oral daily  scopolamine 1 mG/72 Hr(s) Patch 1 Patch Transdermal every 72 hours  senna 2 Tablet(s) Oral at bedtime  silver sulfADIAZINE 1% Cream 1 Application(s) Topical two times a day  valproic  acid Syrup 750 milliGRAM(s) Oral <User Schedule>  vitamin A &amp; D Ointment 1 Application(s) Topical two times a day    MEDICATIONS  (PRN):  acetaminophen     Tablet .. 650 milliGRAM(s) Oral every 6 hours PRN Temp greater or equal to 38C (100.4F), Mild Pain (1 - 3)  albuterol/ipratropium for Nebulization 3 milliLiter(s) Nebulizer every 6 hours PRN Shortness of Breath and/or Wheezing  aluminum hydroxide/magnesium hydroxide/simethicone Suspension 30 milliLiter(s) Oral every 4 hours PRN Dyspepsia  dextrose Oral Gel 15 Gram(s) Oral once PRN Blood Glucose LESS THAN 70 milliGRAM(s)/deciliter      Diet, NPO with Tube Feed:   Tube Feeding Modality: Gastrostomy  Glucerna 1.2 Pedrito  Total Volume for 24 Hours (mL): 1440  Continuous  Until Goal Tube Feed Rate (mL per Hour): 60  Tube Feed Duration (in Hours): 24  Tube Feed Start Time: 14:30  Free Water Flush  Free Water Flush Instructions:  30 ml/hr via pump  Liquid Protein Supplement     Qty per Day:  1 (01-09-23 @ 13:45) [Active]          Vital Signs Last 24 Hrs  T(C): 36.8 (17 Feb 2023 05:06), Max: 37 (16 Feb 2023 12:24)  T(F): 98.3 (17 Feb 2023 05:06), Max: 98.6 (16 Feb 2023 12:24)  HR: 66 (17 Feb 2023 05:06) (66 - 81)  BP: 118/65 (17 Feb 2023 05:06) (100/63 - 129/77)  BP(mean): --  RR: 19 (17 Feb 2023 05:06) (18 - 19)  SpO2: 98% (17 Feb 2023 05:06) (98% - 99%)    Parameters below as of 16 Feb 2023 23:20  Patient On (Oxygen Delivery Method): tracheostomy collar          02-16-23 @ 07:01 - 02-17-23 @ 07:00  --------------------------------------------------------  IN: 0 mL / OUT: 300 mL / NET: -300 mL              LABS:                        10.9   9.31  )-----------( 284      ( 16 Feb 2023 05:30 )             34.5     02-17    x   |  x   |  x   ----------------------------<  x   4.1   |  x   |  x     Mg     2.3     02-17                WBC:  WBC Count: 9.31 K/uL (02-16 @ 05:30)  WBC Count: 11.59 K/uL (02-15 @ 07:00)      MICROBIOLOGY:  RECENT CULTURES:                  Sodium:  Sodium, Serum: 141 mmol/L (02-15 @ 07:00)      0.74 mg/dL 02-15 @ 07:00      Hemoglobin:  Hemoglobin: 10.9 g/dL (02-16 @ 05:30)  Hemoglobin: 10.8 g/dL (02-15 @ 07:00)      Platelets: Platelet Count - Automated: 284 K/uL (02-16 @ 05:30)  Platelet Count - Automated: 295 K/uL (02-15 @ 07:00)              RADIOLOGY & ADDITIONAL STUDIES:      MICROBIOLOGY:  RECENT CULTURES:

## 2023-02-17 NOTE — PROGRESS NOTE ADULT - ASSESSMENT
REVIEW OF SYMPTOMS      Able to give (reliable) ROS  NO     PHYSICAL EXAM    HEENT Unremarkable  atraumatic   RESP Fair air entry EXP prolonged    Harsh breath sound Resp distres mild   CARDIAC S1 S2 No S3     NO JVD    ABDOMEN SOFT BS PRESENT NOT DISTENDED No hepatosplenomegaly   PEDAL EDEMA present No calf tenderness  NO rash       GENERAL DATA .   GOC.  12/11/2022 full code       ALLGY.  nka                            WT. ..  12/2/2022 86  BMI. ..    12/2/2022 29                          ICU STAY.  .. 11/29-12/9  COVID.   .. 12/2/2022 scv2 (-)   .. 11/20/2022 scv2 (-)   BEST PRACTICE ISSUES.    HOB ELEVATN. Yes  DVT PPLX.    .. 1/3/2023 apixa 5.2 (vte)    (DVT 12/12 l Subclav throm)  ALEJO PPLX.   INFN PPLX.   .. 11/14 chlorhex 2%   SP SW ANTWAN.         DIET.    ..  12/15 glucerna 1.2 1440 gt   IV fl.    PROCEDURES.  .. 2/15/2023 pmvaslve placd   .. 1/28  size 4 fenestrated cuffed trach placed by surgery   .. 12/19 debridement of sacral wound     ABGS.  1/6/2023 ac 16/450/.3/5 746/49/123     VS/ PO/IO/ VENT/ DRIPS.  2/17/2023 afeb 77 120/80   2/17/2023 ra 97%     PATIENT PRESENTATION.  72 m doa 11/14/2022 cac    PMH  PMH CAD s/p PCI with stent 2015 and CABG 2014,   PMH  s/p medtronic PPM,   PMH CVA (lacunar infarct)    HOSPITAL COURSE   .. 1) PEA arrest with ROSC after approximately 5 min 11/14  Now communicative   .. 2) DVT 12/12 l Subclav thromS 12/15/2022 lvnx 80.2 1/3 changed to apixaban 5.2  .. 3) TRACH 12/5/2022 trach  .. 4) PEG12/5/2022 peg   .. 5) Cellulitis hand absc 12/13 mod enterococcus fecalis  staph epi 12/12-12/15/2022  cephalexin 12/15 vanco once  12/16-12/19 zosyn  .. 6) Vent weaning         PROBLEM ASSESSMENT RECOMMENDATIONS.  Trach 12/5   .. trach care   Trach change  ..  1/28 size 4 fenestrated cuffed trach  Trach wean.  .. 2/6/2023 pt still has lot of secretions  VTE  .. On apixaba  INFECTION.  .. w 1/17-1/18-1/20-1/21-2/3-2/8-2/10-2/15/2023      w 8.3- 7.9 - 6.8 - 7.2 - 8.8  - 10- 10.8- 10.8   .. pr 1/17-1/20/2023 (-)  (-)   .. 1/16-1/20/2023 Rocephin started by hospitalist dced   CAD.  .. 11/14 asa 81   .. 12/13 metoprolol 25.2   CHF   .. Cr 2/6/2023 Cr .7   .. 11/14/2022 echo mod decr segmental lvsf apical lateral apiccal ant segment are abn takotsubo cmpthy pasp 42 .  .. Monitor for chf has chr hfref per echo   COPD   .. 2/1 duoneb p   .. 12/30 symbicort 160   .. 1/7/2023 glycopyrrolate 1.2   .. 1/9/2023 ipratropium  .. 1/15/2023 scopolamine   .. continue bd ics for copd   Anemia.  .. Hb 1/12-1/14-1/19-1/20-7/21-1/25-2/3-2/8-2/10-2/15/2023       Hb 9.8- 10 -9.1- 9.8  - 9.2 - 9.5 -10- 10 - 10.5 -10.8  .. target hb 7 (+)  .. monitor   sp cac   .. good neuro recovery awake alert interactive   VTE  .. 1/3/2023 apixa 5.2 (vte)    FAMILY COMMUNICATION.  .. 2/12/2023 dw pt son  will ask speech eval for passey cordelia valve  .. 2/12/2023 Pt has a fenestrated trach and has fenestrated inner cannula which is available to place before trying passy cordelia valve     OVERALL   WEANED OFF VENT 1/23   TRACH WEANING    Will start capping when secretions decrease  2/14/2023 dw speech they will try PM valve with fenestrated cannula   2/16/2023 tolerating pm valve  PASSY CORDELIA VALVE   .. 2/15/2023 placed pmv during day time  REHAB 2/16/2023 pt eval requested   FLAILING OF LOWER EXTR   .. Jersks started 1/16   .. 1/19/2023 myoclonic jerks improvd on depakote       TIME SPENT   Over 25 minutes aggregate care time spent on encounter; activities included   direct patient care, counseling and/or coordinating care reviewing notes, lab data/ imaging , discussion with multidisciplinary team/ patient  /family and explaining in detail risks, benefits, alternatives  of the recommendations     BRANDON CAMARILLO

## 2023-02-17 NOTE — PROGRESS NOTE ADULT - ASSESSMENT
73 yo male w/ PMH of COPD, CAD sp PCI w/ stent, CABG 2014, HF w/ PeF, 2nd degree heart block, sp PPM, HTN, DM, CKD Stage 3, CVA- lacunar infarcts admitted to ICU originally for cardiac arrest. ACLS for PEA arrest and ROSC returned after 5 minutes. Cardiac arrest possibly secondary to severe hypoglycemia from eating less and not tracking blood sugar carefully. Hospital course complicated by 1) prolonged hypoxemic respiratory failure. Difficult extubation requiring s/p trach and peg 2) left upper extremity/left subclavian DVT and placed on eliquis 3) multiple infections (Enterococcal faecalis cellulitis, infected left hand hematoma, tracheobronchitis secondary to citrobacter 4) tonic clonic seizure - on Keppra / Depakoate - currently undergoing reevaluation by NEUROLOGY 5) fever of 102 on 1/19. INFECTIOUS DISEASE reconsulted and pan culture ordered.    # Acute/Chronic Respiratory failure w/ Hypoxia and Hypercapnia/ COPD, now trach-vent dependent.  - s/p intubation; failed extubation requiring s/p trach and peg   - PMV (Passy Heath Valve) placed on 02/15/2023.  - tolerating Trach collar, vent discontinued; continue weaning as per Pulmonary   - cont w/ Symbicort, Duonebs prn  - Surgery on board, s/p Trach change to #4 fenestrated tracheostomy placed 1/30. Replaced on 02/14/2023.  - Capping trial per pulmonary this week.    - Pulmonary following.  -Had VFSS/MBS eval on 02/16/2023: Recommended Puree with thin liquids. Ordered.    # Myoclonic Seizure   - EEG: Myoclonic seizure  - Neurology on board   - cont w/ Keppra and Depakote.  - Myoclonic episode to LLE and LUE noted by his son.  - Depakote level 55 (normal ); Keppra level pending.  - Mg 2.3 and K 4.1.  - Will d/w and defer to neurology.    # S/p Cardiac Arrest, acute metabolic encephalopathy due to severe hypoglycemia.  - s/p ACLS w/ ROSC after 5 min   - 2nd degree heart block, s/p PPM in place.  - ECHO Takotsubo cardiomyopathy. EF 50-55%, LVH, mild pHTN    - s/p ICU course; Hemodynamically stable now  - cont w/ Metoprolol, ASA and atorvastatin.    # Multiple infectious Disease   - tracheo-bronchitis secondary to citrobacter?? - resolved.  - enterococcal faecalis cellulitis - resolved   - infected left hand hematoma - s/p bedside debridement 12/13 w/ hand surgery   - monitor off abx              # Occlusive Left subclavian thrombosis   - LUE venous duplex : occlusive thrombus in the left mid subclavian vein with partial slow flow detected in the lateral subclavian vein.  - LUE arterial Duplex neg   - cont w/ Eliquis     # DM2   - A1c 8.8%  - cont w/ Lantus   - cont w/ FS monitoring w/ insulin s/s coverage     # HTN, Cad s/p PCI w/ stent/ CABG and HPL.  - cont with ASA, Metoprolol and Atorvastatin     # Anemia of Chronic Disease  - h/h stable, will cont  to monitor   - Hb 10.9, stable.    # Stage 4 decubiti sacrum wound  - s/p debridement 12/19  -cont w/ wound care as recommended     # Dysphagia/ Moderate Protein Calorie Malnutrition  - Peg tube in place   - continue with tube feeding at goal as tolerated.     # Constipation  - cont w/ senna, Miralax prn     DVT prophylaxis: Eliquis   Disposition: Patient has no insurance and Family in process of getting guardianship. Patient will then need placement.  CM/SW aware.    D/w his son Mr. Barlow at bedside, and he verbalized understanding.

## 2023-02-17 NOTE — CHART NOTE - NSCHARTNOTEFT_GEN_A_CORE
Chart reviewed and noted new PT consult. Patient continues to receive on-going PT interventions with attempts to sit on edge of bed. Remains limited due to weakness and poor balance, but small consistent progress with endurance and tolerance. Patient will remain in PT program c discharge recommendation for Short Term Rehab. Duplicate order completed.

## 2023-02-18 LAB
GLUCOSE BLDC GLUCOMTR-MCNC: 126 MG/DL — HIGH (ref 70–99)
GLUCOSE BLDC GLUCOMTR-MCNC: 130 MG/DL — HIGH (ref 70–99)
GLUCOSE BLDC GLUCOMTR-MCNC: 162 MG/DL — HIGH (ref 70–99)
GLUCOSE BLDC GLUCOMTR-MCNC: 170 MG/DL — HIGH (ref 70–99)
GLUCOSE BLDC GLUCOMTR-MCNC: 192 MG/DL — HIGH (ref 70–99)
GLUCOSE BLDC GLUCOMTR-MCNC: 204 MG/DL — HIGH (ref 70–99)

## 2023-02-18 PROCEDURE — 99233 SBSQ HOSP IP/OBS HIGH 50: CPT

## 2023-02-18 PROCEDURE — 99232 SBSQ HOSP IP/OBS MODERATE 35: CPT

## 2023-02-18 RX ADMIN — APIXABAN 5 MILLIGRAM(S): 2.5 TABLET, FILM COATED ORAL at 06:00

## 2023-02-18 RX ADMIN — Medication 1000 MILLIGRAM(S): at 21:18

## 2023-02-18 RX ADMIN — SCOPALAMINE 1 PATCH: 1 PATCH, EXTENDED RELEASE TRANSDERMAL at 00:52

## 2023-02-18 RX ADMIN — ROBINUL 1 MILLIGRAM(S): 0.2 INJECTION INTRAMUSCULAR; INTRAVENOUS at 18:11

## 2023-02-18 RX ADMIN — Medication 0: at 15:41

## 2023-02-18 RX ADMIN — ROBINUL 1 MILLIGRAM(S): 0.2 INJECTION INTRAMUSCULAR; INTRAVENOUS at 06:00

## 2023-02-18 RX ADMIN — Medication 25 MILLIGRAM(S): at 18:12

## 2023-02-18 RX ADMIN — SCOPALAMINE 1 PATCH: 1 PATCH, EXTENDED RELEASE TRANSDERMAL at 00:16

## 2023-02-18 RX ADMIN — Medication 25 MILLIGRAM(S): at 06:00

## 2023-02-18 RX ADMIN — INSULIN GLARGINE 10 UNIT(S): 100 INJECTION, SOLUTION SUBCUTANEOUS at 21:19

## 2023-02-18 RX ADMIN — LEVETIRACETAM 1500 MILLIGRAM(S): 250 TABLET, FILM COATED ORAL at 05:59

## 2023-02-18 RX ADMIN — ROBINUL 1 MILLIGRAM(S): 0.2 INJECTION INTRAMUSCULAR; INTRAVENOUS at 15:40

## 2023-02-18 RX ADMIN — Medication 1 APPLICATION(S): at 06:02

## 2023-02-18 RX ADMIN — Medication 1 APPLICATION(S): at 05:58

## 2023-02-18 RX ADMIN — Medication 1 MILLIGRAM(S): at 11:14

## 2023-02-18 RX ADMIN — PREGABALIN 1000 MICROGRAM(S): 225 CAPSULE ORAL at 11:14

## 2023-02-18 RX ADMIN — Medication 1 APPLICATION(S): at 06:01

## 2023-02-18 RX ADMIN — BUDESONIDE AND FORMOTEROL FUMARATE DIHYDRATE 2 PUFF(S): 160; 4.5 AEROSOL RESPIRATORY (INHALATION) at 05:59

## 2023-02-18 RX ADMIN — Medication 1 APPLICATION(S): at 18:12

## 2023-02-18 RX ADMIN — ATORVASTATIN CALCIUM 20 MILLIGRAM(S): 80 TABLET, FILM COATED ORAL at 21:19

## 2023-02-18 RX ADMIN — APIXABAN 5 MILLIGRAM(S): 2.5 TABLET, FILM COATED ORAL at 18:11

## 2023-02-18 RX ADMIN — Medication 750 MILLIGRAM(S): at 11:11

## 2023-02-18 RX ADMIN — SCOPALAMINE 1 PATCH: 1 PATCH, EXTENDED RELEASE TRANSDERMAL at 06:02

## 2023-02-18 RX ADMIN — Medication 81 MILLIGRAM(S): at 11:14

## 2023-02-18 RX ADMIN — Medication 1 APPLICATION(S): at 18:11

## 2023-02-18 RX ADMIN — SCOPALAMINE 1 PATCH: 1 PATCH, EXTENDED RELEASE TRANSDERMAL at 18:12

## 2023-02-18 RX ADMIN — LEVETIRACETAM 1500 MILLIGRAM(S): 250 TABLET, FILM COATED ORAL at 18:12

## 2023-02-18 RX ADMIN — CHLORHEXIDINE GLUCONATE 1 APPLICATION(S): 213 SOLUTION TOPICAL at 05:59

## 2023-02-18 RX ADMIN — Medication 1: at 00:53

## 2023-02-18 RX ADMIN — Medication 1 APPLICATION(S): at 05:57

## 2023-02-18 RX ADMIN — BUDESONIDE AND FORMOTEROL FUMARATE DIHYDRATE 2 PUFF(S): 160; 4.5 AEROSOL RESPIRATORY (INHALATION) at 17:19

## 2023-02-18 RX ADMIN — SENNA PLUS 2 TABLET(S): 8.6 TABLET ORAL at 21:20

## 2023-02-18 RX ADMIN — Medication 1: at 18:13

## 2023-02-18 NOTE — PROGRESS NOTE ADULT - SUBJECTIVE AND OBJECTIVE BOX
Seen and examined at bedside.  Has some whitish secretion per RN at bedside.  Afebrile.  No any acute overnight issue.  Vitals stable.  Son at bedside.        Physical Exam:  GENERAL: stable, in bed, more alert today. PEG feeds in place.   NECK: supple, No JVD, Trach collar in place. PMV was placed on 02/15/2023.  CHEST/LUNG: clear to auscultation bilaterally; few b/l rhonchi, or wheezing present.  HEART: normal S1, S2  ABDOMEN: BS+, soft, ND, NT   EXTREMITIES:  pulses palpable; no clubbing, cyanosis, or edema b/l LEs            MEDICATIONS  (STANDING):  apixaban 5 milliGRAM(s) Oral every 12 hours  aspirin  chewable 81 milliGRAM(s) Oral daily  atorvastatin 20 milliGRAM(s) Oral at bedtime  bacitracin   Ointment 1 Application(s) Topical two times a day  bacitracin   Ointment 1 Application(s) Topical two times a day  budesonide 160 MICROgram(s)/formoterol 4.5 MICROgram(s) Inhaler 2 Puff(s) Inhalation two times a day  chlorhexidine 2% Cloths 1 Application(s) Topical <User Schedule>  collagenase Ointment 1 Application(s) Topical two times a day  cyanocobalamin 1000 MICROGram(s) Oral daily  dextrose 5%. 1000 milliLiter(s) (100 mL/Hr) IV Continuous <Continuous>  dextrose 5%. 1000 milliLiter(s) (50 mL/Hr) IV Continuous <Continuous>  dextrose 50% Injectable 25 Gram(s) IV Push once  dextrose 50% Injectable 12.5 Gram(s) IV Push once  dextrose 50% Injectable 25 Gram(s) IV Push once  diphenhydrAMINE Injectable 50 milliGRAM(s) IV Push once  folic acid 1 milliGRAM(s) Oral daily  glucagon  Injectable 1 milliGRAM(s) IntraMuscular once  glycopyrrolate 1 milliGRAM(s) Oral two times a day  insulin glargine Injectable (LANTUS) 10 Unit(s) SubCutaneous at bedtime  insulin lispro (ADMELOG) corrective regimen sliding scale   SubCutaneous every 6 hours  levETIRAcetam 1500 milliGRAM(s) Oral two times a day  metoprolol tartrate 25 milliGRAM(s) Enteral Tube two times a day  polyethylene glycol 3350 17 Gram(s) Oral daily  scopolamine 1 mG/72 Hr(s) Patch 1 Patch Transdermal every 72 hours  senna 2 Tablet(s) Oral at bedtime  silver sulfADIAZINE 1% Cream 1 Application(s) Topical two times a day  valproic  acid Syrup 750 milliGRAM(s) Oral <User Schedule>  vitamin A &amp; D Ointment 1 Application(s) Topical two times a day        MEDICATIONS  (PRN):  acetaminophen     Tablet .. 650 milliGRAM(s) Oral every 6 hours PRN Temp greater or equal to 38C (100.4F), Mild Pain (1 - 3)  albuterol/ipratropium for Nebulization 3 milliLiter(s) Nebulizer every 6 hours PRN Shortness of Breath and/or Wheezing  aluminum hydroxide/magnesium hydroxide/simethicone Suspension 30 milliLiter(s) Oral every 4 hours PRN Dyspepsia  dextrose Oral Gel 15 Gram(s) Oral once PRN Blood Glucose LESS THAN 70 milliGRAM(s)/deciliter        Home Medications:  aspirin 81 mg oral delayed release tablet: 1 tab(s) orally once a day (12 Jun 2020 17:35)  Liquifilm Tears preserved ophthalmic solution: 1 drop(s) to each affected eye 2 times a day (12 Jun 2020 17:35)          < from: Xray Cinesophagram Swallow Function w/ Contrast (02.16.23 @ 09:33) >    IMPRESSION: No penetration or aspiration. Please refer to speech and   swallowing report for further detail regarding the exam.    --- End of Report ---

## 2023-02-18 NOTE — PROGRESS NOTE ADULT - SUBJECTIVE AND OBJECTIVE BOX
BRANDON MÉNDEZL    [facility] [address] [address]    Allergies    No Known Allergies    Intolerances        PAST MEDICAL & SURGICAL HISTORY:  HTN (hypertension)      HLD (hyperlipidemia)      Diabetes mellitus      Lacunar infarction      BPH (benign prostatic hyperplasia)      CHF (congestive heart failure)      Chronic obstructive pulmonary disease, unspecified COPD type      S/P CABG (coronary artery bypass graft)  2014          FAMILY HISTORY:      Home Medications:  aspirin 81 mg oral delayed release tablet: 1 tab(s) orally once a day (12 Jun 2020 17:35)  Liquifilm Tears preserved ophthalmic solution: 1 drop(s) to each affected eye 2 times a day (12 Jun 2020 17:35)      MEDICATIONS  (STANDING):  apixaban 5 milliGRAM(s) Oral every 12 hours  aspirin  chewable 81 milliGRAM(s) Oral daily  atorvastatin 20 milliGRAM(s) Oral at bedtime  bacitracin   Ointment 1 Application(s) Topical two times a day  bacitracin   Ointment 1 Application(s) Topical two times a day  budesonide 160 MICROgram(s)/formoterol 4.5 MICROgram(s) Inhaler 2 Puff(s) Inhalation two times a day  chlorhexidine 2% Cloths 1 Application(s) Topical <User Schedule>  collagenase Ointment 1 Application(s) Topical two times a day  cyanocobalamin 1000 MICROGram(s) Oral daily  dextrose 5%. 1000 milliLiter(s) (50 mL/Hr) IV Continuous <Continuous>  dextrose 5%. 1000 milliLiter(s) (100 mL/Hr) IV Continuous <Continuous>  dextrose 50% Injectable 25 Gram(s) IV Push once  dextrose 50% Injectable 12.5 Gram(s) IV Push once  dextrose 50% Injectable 25 Gram(s) IV Push once  diphenhydrAMINE Injectable 50 milliGRAM(s) IV Push once  folic acid 1 milliGRAM(s) Oral daily  glucagon  Injectable 1 milliGRAM(s) IntraMuscular once  glycopyrrolate 1 milliGRAM(s) Oral two times a day  insulin glargine Injectable (LANTUS) 10 Unit(s) SubCutaneous at bedtime  insulin lispro (ADMELOG) corrective regimen sliding scale   SubCutaneous every 6 hours  levETIRAcetam  Solution 1500 milliGRAM(s) Oral two times a day  metoprolol tartrate 25 milliGRAM(s) Enteral Tube two times a day  polyethylene glycol 3350 17 Gram(s) Oral daily  scopolamine 1 mG/72 Hr(s) Patch 1 Patch Transdermal every 72 hours  senna 2 Tablet(s) Oral at bedtime  silver sulfADIAZINE 1% Cream 1 Application(s) Topical two times a day  valproic  acid Syrup 1000 milliGRAM(s) Oral <User Schedule>  valproic  acid Syrup 750 milliGRAM(s) Oral <User Schedule>  vitamin A &amp; D Ointment 1 Application(s) Topical two times a day    MEDICATIONS  (PRN):  acetaminophen     Tablet .. 650 milliGRAM(s) Oral every 6 hours PRN Temp greater or equal to 38C (100.4F), Mild Pain (1 - 3)  albuterol/ipratropium for Nebulization 3 milliLiter(s) Nebulizer every 6 hours PRN Shortness of Breath and/or Wheezing  aluminum hydroxide/magnesium hydroxide/simethicone Suspension 30 milliLiter(s) Oral every 4 hours PRN Dyspepsia  dextrose Oral Gel 15 Gram(s) Oral once PRN Blood Glucose LESS THAN 70 milliGRAM(s)/deciliter      Diet, NPO with Tube Feed:   Tube Feeding Modality: Gastrostomy  Glucerna 1.2 Pedrito  Total Volume for 24 Hours (mL): 1440  Continuous  Until Goal Tube Feed Rate (mL per Hour): 60  Tube Feed Duration (in Hours): 24  Tube Feed Start Time: 14:30  Free Water Flush  Free Water Flush Instructions:  30 ml/hr via pump  Liquid Protein Supplement     Qty per Day:  1 (01-09-23 @ 13:45) [Active]          Vital Signs Last 24 Hrs  T(C): 36.7 (18 Feb 2023 10:42), Max: 36.7 (17 Feb 2023 23:58)  T(F): 98.1 (18 Feb 2023 10:42), Max: 98.1 (17 Feb 2023 23:58)  HR: 68 (18 Feb 2023 11:56) (65 - 93)  BP: 137/65 (18 Feb 2023 10:42) (125/63 - 164/70)  BP(mean): --  RR: 18 (18 Feb 2023 11:56) (17 - 20)  SpO2: 98% (18 Feb 2023 11:56) (95% - 100%)    Parameters below as of 18 Feb 2023 11:56  Patient On (Oxygen Delivery Method): tracheostomy collar  O2 Flow (L/min): 4  O2 Concentration (%): 35      02-17-23 @ 07:01  -  02-18-23 @ 07:00  --------------------------------------------------------  IN: 0 mL / OUT: 600 mL / NET: -600 mL              LABS:    02-17    x   |  x   |  x   ----------------------------<  x   4.1   |  x   |  x     Mg     2.3     02-17                WBC:  WBC Count: 9.31 K/uL (02-16 @ 05:30)  WBC Count: 11.59 K/uL (02-15 @ 07:00)      MICROBIOLOGY:  RECENT CULTURES:                  Sodium:  Sodium, Serum: 141 mmol/L (02-15 @ 07:00)      0.74 mg/dL 02-15 @ 07:00      Hemoglobin:  Hemoglobin: 10.9 g/dL (02-16 @ 05:30)  Hemoglobin: 10.8 g/dL (02-15 @ 07:00)      Platelets: Platelet Count - Automated: 284 K/uL (02-16 @ 05:30)  Platelet Count - Automated: 295 K/uL (02-15 @ 07:00)              RADIOLOGY & ADDITIONAL STUDIES:      MICROBIOLOGY:  RECENT CULTURES:

## 2023-02-18 NOTE — PROGRESS NOTE ADULT - ASSESSMENT
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 ACLS for PEA arrest and ROSC returned after 5 minutes. Cardiac arrest possibly secondary to severe hypoglycemia from eating less and not tracking blood sugar carefully. Hospital course complicated by 1) prolonged hypoxemic respiratory failure. Difficult extubation requiring s/p trach and peg 2) left upper extremity/left subclavian DVT and placed on eliquis 3) multiple infections (Enterococcal faecalis cellulitis, infected left hand hematoma, tracheobronchitis secondary to citrobacter 4) tonic clonic seizure - on Keppra / Depakoate - currently undergoing reevaluation by NEUROLOGY 5) fever of 102 on 1/19. INFECTIOUS DISEASE reconsulted and pan culture ordered.    # Acute/Chronic Respiratory failure w/ Hypoxia and Hypercapnia/ COPD, now trach-vent dependent.  - s/p intubation; failed extubation requiring s/p trach and peg   - PMV (Passy [place] Valve) placed on 02/15/2023.  - tolerating Trach collar, vent discontinued; continue weaning as per Pulmonary   - cont w/ Symbicort, Duonebs prn  - Surgery on board, s/p Trach change to #4 fenestrated tracheostomy placed 1/30. Replaced on 02/14/2023.  - Capping trial per pulmonary this week.    - Pulmonary following.  -Had VFSS/MBS eval on 02/16/2023: Recommended Puree with thin liquids. Ordered.    # Myoclonic Seizure   - EEG: Myoclonic seizure  - Neurology on board   - cont w/ Keppra and Depakote.  - Myoclonic episode to LLE and LUE noted by his son.  - Mg 2.3 and K 4.1.  - Depakote level 55 (normal ); Keppra level pending.  - D/w Dr Loo: recommended increase only the evening dose of Depakote from 750 mg to 1000 mg. and continue 750 mg in am.  - Changes made yesterday.    # S/p Cardiac Arrest, acute metabolic encephalopathy due to severe hypoglycemia.  - s/p ACLS w/ ROSC after 5 min   - 2nd degree heart block, s/p PPM in place.  - ECHO Takotsubo cardiomyopathy. EF 50-55%, LVH, mild pHTN    - s/p ICU course; Hemodynamically stable now  - cont w/ Metoprolol, ASA and atorvastatin.    # Multiple infectious Disease   - tracheo-bronchitis secondary to citrobacter?? - resolved.  - enterococcal faecalis cellulitis - resolved   - infected left hand hematoma - s/p bedside debridement 12/13 w/ hand surgery   - monitor off abx              # Occlusive Left subclavian thrombosis   - LUE venous duplex : occlusive thrombus in the left mid subclavian vein with partial slow flow detected in the lateral subclavian vein.  - LUE arterial Duplex neg   - cont w/ Eliquis     # DM2   - A1c 8.8%  - cont w/ Lantus   - cont w/ FS monitoring w/ insulin s/s coverage     # HTN, Cad s/p PCI w/ stent/ CABG and HPL.  - cont with ASA, Metoprolol and Atorvastatin     # Anemia of Chronic Disease  - h/h stable, will cont  to monitor   - Hb 10.9, stable.    # Stage 4 decubiti sacrum wound  - s/p debridement 12/19  -cont w/ wound care as recommended     # Dysphagia/ Moderate Protein Calorie Malnutrition  - Peg tube in place   - continue with tube feeding at goal as tolerated.     # Constipation  - cont w/ senna, Miralax prn     DVT prophylaxis: Eliquis   Disposition: Patient has no insurance and Family in process of getting guardianship. Patient will then need placement.  CM/SW aware.    D/w his son Mr. Barlow at bedside, and he verbalized understanding.

## 2023-02-18 NOTE — PROGRESS NOTE ADULT - ASSESSMENT
REVIEW OF SYMPTOMS      Able to give (reliable) ROS  NO     PHYSICAL EXAM    HEENT Unremarkable  atraumatic   RESP Fair air entry EXP prolonged    Harsh breath sound Resp distres mild   CARDIAC S1 S2 No S3     NO JVD    ABDOMEN SOFT BS PRESENT NOT DISTENDED No hepatosplenomegaly   PEDAL EDEMA present No calf tenderness  NO rash       GENERAL DATA .   GOC.  12/11/2022 full code       ALLGY.  nka                            WT. ..  12/2/2022 86  BMI. ..    12/2/2022 29                          ICU STAY.  .. 11/29-12/9  COVID.   .. 12/2/2022 scv2 (-)   .. 11/20/2022 scv2 (-)   BEST PRACTICE ISSUES.    HOB ELEVATN. Yes  DVT PPLX.    .. 1/3/2023 apixa 5.2 (vte)    (DVT 12/12 l Subclav throm)  ALEJO PPLX.   INFN PPLX.   .. 11/14 chlorhex 2%   SP SW ANTWAN.         DIET.    ..  12/15 glucerna 1.2 1440 gt   IV fl.    PROCEDURES.  .. 2/15/2023 pmvaslve placd   .. 1/28  size 4 fenestrated cuffed trach placed by surgery   .. 12/19 debridement of sacral wound     ABGS.  1/6/2023 ac 16/450/.3/5 746/49/123     VS/ PO/IO/ VENT/ DRIPS.  2/18/2023 70 120/70   2/18/2023 tc 28% po 98%    PATIENT PRESENTATION.  72 m doa 11/14/2022 cac    PMH  PMH CAD s/p PCI with stent 2015 and CABG 2014,   PMH  s/p medtronic PPM,   PMH CVA (lacunar infarct)    HOSPITAL COURSE   .. 1) PEA arrest with ROSC after approximately 5 min 11/14  Now communicative   .. 2) DVT 12/12 l Subclav thromS 12/15/2022 lvnx 80.2 1/3 changed to apixaban 5.2  .. 3) TRACH 12/5/2022 trach  .. 4) PEG12/5/2022 peg   .. 5) Cellulitis hand absc 12/13 mod enterococcus fecalis  staph epi 12/12-12/15/2022  cephalexin 12/15 vanco once  12/16-12/19 zosyn  .. 6) Vent weaning     PROBLEM ASSESSMENT RECOMMENDATIONS.  Trach 12/5   .. trach care   Trach change  ..  1/28 size 4 fenestrated cuffed trach  Trach wean.  .. 2/6/2023 pt still has lot of secretions  VTE  .. On apixaba  INFECTION.  .. w 1/17-1/18-1/20-1/21-2/3-2/8-2/10-2/15/2023      w 8.3- 7.9 - 6.8 - 7.2 - 8.8  - 10- 10.8- 10.8   .. pr 1/17-1/20/2023 (-)  (-)   .. 1/16-1/20/2023 Rocephin started by hospitalist dced   CAD.  .. 11/14 asa 81   .. 12/13 metoprolol 25.2   CHF   .. Cr 2/6/2023 Cr .7   .. 11/14/2022 echo mod decr segmental lvsf apical lateral apiccal ant segment are abn takotsubo cmpthy pasp 42 .  .. Monitor for chf has chr hfref per echo   COPD   .. 2/1 duoneb p   .. 12/30 symbicort 160   .. 1/7/2023 glycopyrrolate 1.2   .. 1/9/2023 ipratropium  .. 1/15/2023 scopolamine   .. continue bd ics for copd   Anemia.  .. Hb 1/12-1/14-1/19-1/20-7/21-1/25-2/3-2/8-2/10-2/15/2023       Hb 9.8- 10 -9.1- 9.8  - 9.2 - 9.5 -10- 10 - 10.5 -10.8  .. target hb 7 (+)  .. monitor   sp cac   .. good neuro recovery awake alert interactive   VTE  .. 1/3/2023 apixa 5.2 (vte)    FAMILY COMMUNICATION.  .. 2/12/2023 dw pt son  will ask speech eval for passey cordelia valve  .. 2/12/2023 Pt has a fenestrated trach and has fenestrated inner cannula which is available to place before trying passy cordelia valve     OVERALL   WEANED OFF VENT 1/23   TRACH WEANING    Will start capping when secretions decrease  2/14/2023 dw speech they will try PM valve with fenestrated cannula   2/16/2023 tolerating pm valve  PASSY CORDELIA VALVE   .. 2/15/2023 placed pmv during day time  REHAB 2/16/2023 pt eval requested   FLAILING OF LOWER EXTR   .. Jersks started 1/16   .. 1/19/2023 myoclonic jerks improvd on depakote       TIME SPENT   Over 25 minutes aggregate care time spent on encounter; activities included   direct patient care, counseling and/or coordinating care reviewing notes, lab data/ imaging , discussion with multidisciplinary team/ patient  /family and explaining in detail risks, benefits, alternatives  of the recommendations     BRANDON CAMARILLO

## 2023-02-19 LAB
GLUCOSE BLDC GLUCOMTR-MCNC: 116 MG/DL — HIGH (ref 70–99)
GLUCOSE BLDC GLUCOMTR-MCNC: 146 MG/DL — HIGH (ref 70–99)
GLUCOSE BLDC GLUCOMTR-MCNC: 166 MG/DL — HIGH (ref 70–99)
GLUCOSE BLDC GLUCOMTR-MCNC: 174 MG/DL — HIGH (ref 70–99)
GLUCOSE BLDC GLUCOMTR-MCNC: 174 MG/DL — HIGH (ref 70–99)
GLUCOSE BLDC GLUCOMTR-MCNC: 198 MG/DL — HIGH (ref 70–99)

## 2023-02-19 PROCEDURE — 99232 SBSQ HOSP IP/OBS MODERATE 35: CPT

## 2023-02-19 RX ADMIN — Medication 25 MILLIGRAM(S): at 05:10

## 2023-02-19 RX ADMIN — ROBINUL 1 MILLIGRAM(S): 0.2 INJECTION INTRAMUSCULAR; INTRAVENOUS at 05:09

## 2023-02-19 RX ADMIN — Medication 1 MILLIGRAM(S): at 11:31

## 2023-02-19 RX ADMIN — BUDESONIDE AND FORMOTEROL FUMARATE DIHYDRATE 2 PUFF(S): 160; 4.5 AEROSOL RESPIRATORY (INHALATION) at 05:10

## 2023-02-19 RX ADMIN — ROBINUL 1 MILLIGRAM(S): 0.2 INJECTION INTRAMUSCULAR; INTRAVENOUS at 18:07

## 2023-02-19 RX ADMIN — BUDESONIDE AND FORMOTEROL FUMARATE DIHYDRATE 2 PUFF(S): 160; 4.5 AEROSOL RESPIRATORY (INHALATION) at 17:15

## 2023-02-19 RX ADMIN — LEVETIRACETAM 1500 MILLIGRAM(S): 250 TABLET, FILM COATED ORAL at 05:07

## 2023-02-19 RX ADMIN — CHLORHEXIDINE GLUCONATE 1 APPLICATION(S): 213 SOLUTION TOPICAL at 05:06

## 2023-02-19 RX ADMIN — Medication 25 MILLIGRAM(S): at 18:07

## 2023-02-19 RX ADMIN — Medication 1 APPLICATION(S): at 18:05

## 2023-02-19 RX ADMIN — SCOPALAMINE 1 PATCH: 1 PATCH, EXTENDED RELEASE TRANSDERMAL at 18:05

## 2023-02-19 RX ADMIN — Medication 81 MILLIGRAM(S): at 15:42

## 2023-02-19 RX ADMIN — Medication 1 APPLICATION(S): at 18:06

## 2023-02-19 RX ADMIN — Medication 1 APPLICATION(S): at 05:12

## 2023-02-19 RX ADMIN — Medication 1000 MILLIGRAM(S): at 21:20

## 2023-02-19 RX ADMIN — Medication 1 APPLICATION(S): at 18:04

## 2023-02-19 RX ADMIN — Medication 1 APPLICATION(S): at 05:11

## 2023-02-19 RX ADMIN — INSULIN GLARGINE 10 UNIT(S): 100 INJECTION, SOLUTION SUBCUTANEOUS at 21:20

## 2023-02-19 RX ADMIN — APIXABAN 5 MILLIGRAM(S): 2.5 TABLET, FILM COATED ORAL at 05:10

## 2023-02-19 RX ADMIN — Medication 1: at 18:07

## 2023-02-19 RX ADMIN — PREGABALIN 1000 MICROGRAM(S): 225 CAPSULE ORAL at 11:32

## 2023-02-19 RX ADMIN — LEVETIRACETAM 1500 MILLIGRAM(S): 250 TABLET, FILM COATED ORAL at 18:07

## 2023-02-19 RX ADMIN — Medication 0: at 15:42

## 2023-02-19 RX ADMIN — Medication 1: at 01:57

## 2023-02-19 RX ADMIN — Medication 1: at 06:09

## 2023-02-19 RX ADMIN — Medication 750 MILLIGRAM(S): at 08:23

## 2023-02-19 RX ADMIN — SCOPALAMINE 1 PATCH: 1 PATCH, EXTENDED RELEASE TRANSDERMAL at 08:23

## 2023-02-19 RX ADMIN — ATORVASTATIN CALCIUM 20 MILLIGRAM(S): 80 TABLET, FILM COATED ORAL at 21:20

## 2023-02-19 RX ADMIN — APIXABAN 5 MILLIGRAM(S): 2.5 TABLET, FILM COATED ORAL at 18:07

## 2023-02-19 RX ADMIN — Medication 1 APPLICATION(S): at 05:07

## 2023-02-19 NOTE — PROGRESS NOTE ADULT - ASSESSMENT
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 ACLS for PEA arrest and ROSC returned after 5 minutes. Cardiac arrest possibly secondary to severe hypoglycemia from eating less and not tracking blood sugar carefully. Hospital course complicated by 1) prolonged hypoxemic respiratory failure. Difficult extubation requiring s/p trach and peg 2) left upper extremity/left subclavian DVT and placed on eliquis 3) multiple infections (Enterococcal faecalis cellulitis, infected left hand hematoma, tracheobronchitis secondary to citrobacter 4) tonic clonic seizure - on Keppra / Depakoate - currently undergoing reevaluation by NEUROLOGY 5) fever of 102 on 1/19. INFECTIOUS DISEASE reconsulted and pan culture ordered.    # Acute/Chronic Respiratory failure w/ Hypoxia and Hypercapnia/ COPD, now trach-vent dependent.  - S/p intubation; failed extubation requiring s/p trach and peg   - PMV (Passy [place] Valve) placed on 02/15/2023.  - tolerating Trach collar, vent discontinued; continue weaning as per Pulmonary   - cont w/ Symbicort, Duonebs prn  - Surgery on board, s/p Trach change to #4 fenestrated tracheostomy placed 1/30. Replaced on 02/14/2023.  - Capping trial per pulmonary this week.    - Had VFSS/MBS eval on 02/16/2023: Recommended Puree with thin liquids. Ordered.  - Frequent suction.  - Management as per Pulmonary.    # Myoclonic Seizure   - EEG: Myoclonic seizure  - Neurology on board   - cont w/ Keppra and Depakote.  - Myoclonic episode to LLE and LUE noted by his son.  - Mg 2.3 and K 4.1.  - Depakote level 55 (normal ); Keppra level pending.  - D/w Dr Loo (02/18/2023): recommended increase only the evening dose of Depakote from 750 mg to 1000 mg. and continue 750 mg in am.  - Changes made yesterday, on 02/18/2023.    # S/p Cardiac Arrest, acute metabolic encephalopathy due to severe hypoglycemia.  - s/p ACLS w/ ROSC after 5 min   - 2nd degree heart block, s/p PPM in place.  - ECHO Takotsubo cardiomyopathy. EF 50-55%, LVH, mild pHTN    - s/p ICU course; Hemodynamically stable now  - cont w/ Metoprolol, ASA and atorvastatin.    # Multiple infectious Disease   - tracheo-bronchitis secondary to citrobacter?? - resolved.  - enterococcal faecalis cellulitis - resolved   - infected left hand hematoma - s/p bedside debridement 12/13 w/ hand surgery   - monitor off abx              # Occlusive Left subclavian thrombosis   - LUE venous duplex : occlusive thrombus in the left mid subclavian vein with partial slow flow detected in the lateral subclavian vein.  - LUE arterial Duplex neg   - cont w/ Eliquis     # DM2   - A1c 8.8%  - cont w/ Lantus   - cont w/ FS monitoring w/ insulin s/s coverage     # HTN, Cad s/p PCI w/ stent/ CABG and HPL.  - cont with ASA, Metoprolol and Atorvastatin     # Anemia of Chronic Disease  - h/h stable, will cont  to monitor   - Hb 10.9, stable.    # Stage 4 decubiti sacrum wound  - s/p debridement 12/19  -cont w/ wound care as recommended     # Dysphagia/ Moderate Protein Calorie Malnutrition  - Peg tube in place   - continue with tube feeding at goal as tolerated.     # Constipation  - cont w/ senna, Miralax prn     DVT prophylaxis: Eliquis   Disposition: Patient has no insurance and Family in process of getting guardianship. Patient will then need placement.  CM/SW aware.    D/w his son Mr. Barlow at bedside, and he verbalized understanding.

## 2023-02-19 NOTE — PROGRESS NOTE ADULT - ASSESSMENT
REVIEW OF SYMPTOMS      Able to give (reliable) ROS  NO     PHYSICAL EXAM    HEENT Unremarkable  atraumatic   RESP Fair air entry EXP prolonged    Harsh breath sound Resp distres mild   CARDIAC S1 S2 No S3     NO JVD    ABDOMEN SOFT BS PRESENT NOT DISTENDED No hepatosplenomegaly   PEDAL EDEMA present No calf tenderness  NO rash       GENERAL DATA .   GOC.  12/11/2022 full code       ALLGY.  nka                            WT. ..  12/2/2022 86  BMI. ..    12/2/2022 29                          ICU STAY.  .. 11/29-12/9  COVID.   .. 12/2/2022 scv2 (-)   .. 11/20/2022 scv2 (-)   BEST PRACTICE ISSUES.    HOB ELEVATN. Yes  DVT PPLX.    .. 1/3/2023 apixa 5.2 (vte)    (DVT 12/12 l Subclav throm)  ALEJO PPLX.   INFN PPLX.   .. 11/14 chlorhex 2%   SP SW ANTWAN.         DIET.    ..  12/15 glucerna 1.2 1440 gt   IV fl.    PROCEDURES.  .. 2/15/2023 pmvaslve placd   .. 1/28  size 4 fenestrated cuffed trach placed by surgery   .. 12/19 debridement of sacral wound     ABGS.  1/6/2023 ac 16/450/.3/5 746/49/123     VS/ PO/IO/ VENT/ DRIPS.  2/19/2023 afebr 70 110/60   2/19/2023 .28 98%     PATIENT PRESENTATION.  72 m doa 11/14/2022 cac    PMH  PMH CAD s/p PCI with stent 2015 and CABG 2014,   PMH  s/p medtronic PPM,   PMH CVA (lacunar infarct)    HOSPITAL COURSE   .. 1) PEA arrest with ROSC after approximately 5 min 11/14  Now communicative   .. 2) DVT 12/12 l Subclav thromS 12/15/2022 lvnx 80.2 1/3 changed to apixaban 5.2  .. 3) TRACH 12/5/2022 trach  .. 4) PEG12/5/2022 peg   .. 5) Cellulitis hand absc 12/13 mod enterococcus fecalis  staph epi 12/12-12/15/2022  cephalexin 12/15 vanco once  12/16-12/19 zosyn  .. 6) Vent weaning     PROBLEM ASSESSMENT RECOMMENDATIONS.  Trach 12/5   .. trach care   Trach change  ..  1/28 size 4 fenestrated cuffed trach  Trach wean.  .. 2/6/2023 pt still has lot of secretions  VTE  .. On apixaba  INFECTION.  .. w 1/17-1/18-1/20-1/21-2/3-2/8-2/10-2/15/2023      w 8.3- 7.9 - 6.8 - 7.2 - 8.8  - 10- 10.8- 10.8   .. pr 1/17-1/20/2023 (-)  (-)   .. 1/16-1/20/2023 Rocephin started by hospitalist dced   CAD.  .. 11/14 asa 81   .. 12/13 metoprolol 25.2   CHF   .. Cr 2/6/2023 Cr .7   .. 11/14/2022 echo mod decr segmental lvsf apical lateral apiccal ant segment are abn takotsubo cmpthy pasp 42 .  .. Monitor for chf has chr hfref per echo   COPD   .. 2/1 duoneb p   .. 12/30 symbicort 160   .. 1/7/2023 glycopyrrolate 1.2   .. 1/9/2023 ipratropium  .. 1/15/2023 scopolamine   .. continue bd ics for copd   Anemia.  .. Hb 1/12-1/14-1/19-1/20-7/21-1/25-2/3-2/8-2/10-2/15/2023       Hb 9.8- 10 -9.1- 9.8  - 9.2 - 9.5 -10- 10 - 10.5 -10.8  .. target hb 7 (+)  .. monitor   sp cac   .. good neuro recovery awake alert interactive   VTE  .. 1/3/2023 apixa 5.2 (vte)    FAMILY COMMUNICATION.  .. 2/12/2023 dw pt son  will ask speech eval for passey cordelia valve  .. 2/12/2023 Pt has a fenestrated trach and has fenestrated inner cannula which is available to place before trying passy cordelia valve     OVERALL   WEANED OFF VENT 1/23   TRACH WEANING    Will start capping when secretions decrease  2/14/2023 dw speech they will try PM valve with fenestrated cannula   2/16/2023 tolerating pm valve  PASSY CORDELIA VALVE   .. 2/15/2023 placed pmv during day time  REHAB 2/16/2023 pt eval requested   FLAILING OF LOWER EXTR   .. Jersks started 1/16   .. 1/19/2023 myoclonic jerks improvd on depakote       TIME SPENT   Over 25 minutes aggregate care time spent on encounter; activities included   direct patient care, counseling and/or coordinating care reviewing notes, lab data/ imaging , discussion with multidisciplinary team/ patient  /family and explaining in detail risks, benefits, alternatives  of the recommendations     BRANDON CAMARILLO

## 2023-02-19 NOTE — PROGRESS NOTE ADULT - SUBJECTIVE AND OBJECTIVE BOX
BRANDON MÉNDEZL    [facility] [address]    Allergies    No Known Allergies    Intolerances        PAST MEDICAL & SURGICAL HISTORY:  HTN (hypertension)      HLD (hyperlipidemia)      Diabetes mellitus      Lacunar infarction      BPH (benign prostatic hyperplasia)      CHF (congestive heart failure)      Chronic obstructive pulmonary disease, unspecified COPD type      S/P CABG (coronary artery bypass graft)  2014          FAMILY HISTORY:      Home Medications:  aspirin 81 mg oral delayed release tablet: 1 tab(s) orally once a day (12 Jun 2020 17:35)  Liquifilm Tears preserved ophthalmic solution: 1 drop(s) to each affected eye 2 times a day (12 Jun 2020 17:35)      MEDICATIONS  (STANDING):  apixaban 5 milliGRAM(s) Oral every 12 hours  aspirin  chewable 81 milliGRAM(s) Oral daily  atorvastatin 20 milliGRAM(s) Oral at bedtime  bacitracin   Ointment 1 Application(s) Topical two times a day  bacitracin   Ointment 1 Application(s) Topical two times a day  budesonide 160 MICROgram(s)/formoterol 4.5 MICROgram(s) Inhaler 2 Puff(s) Inhalation two times a day  chlorhexidine 2% Cloths 1 Application(s) Topical <User Schedule>  collagenase Ointment 1 Application(s) Topical two times a day  cyanocobalamin 1000 MICROGram(s) Oral daily  dextrose 5%. 1000 milliLiter(s) (50 mL/Hr) IV Continuous <Continuous>  dextrose 5%. 1000 milliLiter(s) (100 mL/Hr) IV Continuous <Continuous>  dextrose 50% Injectable 25 Gram(s) IV Push once  dextrose 50% Injectable 12.5 Gram(s) IV Push once  dextrose 50% Injectable 25 Gram(s) IV Push once  diphenhydrAMINE Injectable 50 milliGRAM(s) IV Push once  folic acid 1 milliGRAM(s) Oral daily  glucagon  Injectable 1 milliGRAM(s) IntraMuscular once  glycopyrrolate 1 milliGRAM(s) Oral two times a day  insulin glargine Injectable (LANTUS) 10 Unit(s) SubCutaneous at bedtime  insulin lispro (ADMELOG) corrective regimen sliding scale   SubCutaneous every 6 hours  levETIRAcetam  Solution 1500 milliGRAM(s) Oral two times a day  metoprolol tartrate 25 milliGRAM(s) Enteral Tube two times a day  polyethylene glycol 3350 17 Gram(s) Oral daily  scopolamine 1 mG/72 Hr(s) Patch 1 Patch Transdermal every 72 hours  senna 2 Tablet(s) Oral at bedtime  silver sulfADIAZINE 1% Cream 1 Application(s) Topical two times a day  valproic  acid Syrup 1000 milliGRAM(s) Oral <User Schedule>  valproic  acid Syrup 750 milliGRAM(s) Oral <User Schedule>  vitamin A &amp; D Ointment 1 Application(s) Topical two times a day    MEDICATIONS  (PRN):  acetaminophen     Tablet .. 650 milliGRAM(s) Oral every 6 hours PRN Temp greater or equal to 38C (100.4F), Mild Pain (1 - 3)  albuterol/ipratropium for Nebulization 3 milliLiter(s) Nebulizer every 6 hours PRN Shortness of Breath and/or Wheezing  aluminum hydroxide/magnesium hydroxide/simethicone Suspension 30 milliLiter(s) Oral every 4 hours PRN Dyspepsia  dextrose Oral Gel 15 Gram(s) Oral once PRN Blood Glucose LESS THAN 70 milliGRAM(s)/deciliter      Diet, NPO with Tube Feed:   Tube Feeding Modality: Gastrostomy  Glucerna 1.2 Pedrito  Total Volume for 24 Hours (mL): 1440  Continuous  Until Goal Tube Feed Rate (mL per Hour): 60  Tube Feed Duration (in Hours): 24  Tube Feed Start Time: 14:30  Free Water Flush  Free Water Flush Instructions:  30 ml/hr via pump  Liquid Protein Supplement     Qty per Day:  1 (01-09-23 @ 13:45) [Active]          Vital Signs Last 24 Hrs  T(C): 36.7 (19 Feb 2023 04:55), Max: 36.9 (19 Feb 2023 00:13)  T(F): 98.1 (19 Feb 2023 04:55), Max: 98.4 (19 Feb 2023 00:13)  HR: 70 (19 Feb 2023 09:04) (56 - 73)  BP: 124/74 (19 Feb 2023 04:55) (119/71 - 124/74)  BP(mean): --  RR: 18 (19 Feb 2023 09:04) (18 - 19)  SpO2: 98% (19 Feb 2023 09:04) (97% - 100%)    Parameters below as of 19 Feb 2023 09:04  Patient On (Oxygen Delivery Method): tracheostomy collar  O2 Flow (L/min): 4  O2 Concentration (%): 28      02-18-23 @ 07:01  -  02-19-23 @ 07:00  --------------------------------------------------------  IN: 0 mL / OUT: 500 mL / NET: -500 mL              LABS:                    WBC:  WBC Count: 9.31 K/uL (02-16 @ 05:30)      MICROBIOLOGY:  RECENT CULTURES:                  Sodium:          Hemoglobin:  Hemoglobin: 10.9 g/dL (02-16 @ 05:30)      Platelets: Platelet Count - Automated: 284 K/uL (02-16 @ 05:30)              RADIOLOGY & ADDITIONAL STUDIES:      MICROBIOLOGY:  RECENT CULTURES:

## 2023-02-19 NOTE — PROGRESS NOTE ADULT - SUBJECTIVE AND OBJECTIVE BOX
Seen and examined at bedside.  He wrote on a paper saying, "He is feeling better".  Afebrile.  No any acute overnight issue.  Vitals stable.          Physical Exam:  GENERAL: stable, in bed, more alert today. PEG feeds in place.   NECK: supple, No JVD, Trach collar in place. PMV was placed on 02/15/2023.  CHEST/LUNG: clear to auscultation bilaterally; few b/l rhonchi, or wheezing present.  HEART: normal S1, S2  ABDOMEN: BS+, soft, ND, NT   EXTREMITIES:  pulses palpable; no clubbing, cyanosis, or edema b/l LEs.        MEDICATIONS  (STANDING):  apixaban 5 milliGRAM(s) Oral every 12 hours  aspirin  chewable 81 milliGRAM(s) Oral daily  atorvastatin 20 milliGRAM(s) Oral at bedtime  bacitracin   Ointment 1 Application(s) Topical two times a day  bacitracin   Ointment 1 Application(s) Topical two times a day  budesonide 160 MICROgram(s)/formoterol 4.5 MICROgram(s) Inhaler 2 Puff(s) Inhalation two times a day  chlorhexidine 2% Cloths 1 Application(s) Topical <User Schedule>  collagenase Ointment 1 Application(s) Topical two times a day  cyanocobalamin 1000 MICROGram(s) Oral daily  dextrose 5%. 1000 milliLiter(s) (50 mL/Hr) IV Continuous <Continuous>  dextrose 5%. 1000 milliLiter(s) (100 mL/Hr) IV Continuous <Continuous>  dextrose 50% Injectable 25 Gram(s) IV Push once  dextrose 50% Injectable 12.5 Gram(s) IV Push once  dextrose 50% Injectable 25 Gram(s) IV Push once  diphenhydrAMINE Injectable 50 milliGRAM(s) IV Push once  folic acid 1 milliGRAM(s) Oral daily  glucagon  Injectable 1 milliGRAM(s) IntraMuscular once  glycopyrrolate 1 milliGRAM(s) Oral two times a day  insulin glargine Injectable (LANTUS) 10 Unit(s) SubCutaneous at bedtime  insulin lispro (ADMELOG) corrective regimen sliding scale   SubCutaneous every 6 hours  levETIRAcetam  Solution 1500 milliGRAM(s) Oral two times a day  metoprolol tartrate 25 milliGRAM(s) Enteral Tube two times a day  polyethylene glycol 3350 17 Gram(s) Oral daily  scopolamine 1 mG/72 Hr(s) Patch 1 Patch Transdermal every 72 hours  senna 2 Tablet(s) Oral at bedtime  silver sulfADIAZINE 1% Cream 1 Application(s) Topical two times a day  valproic  acid Syrup 1000 milliGRAM(s) Oral <User Schedule>  valproic  acid Syrup 750 milliGRAM(s) Oral <User Schedule>  vitamin A &amp; D Ointment 1 Application(s) Topical two times a day

## 2023-02-20 LAB
GLUCOSE BLDC GLUCOMTR-MCNC: 163 MG/DL — HIGH (ref 70–99)
GLUCOSE BLDC GLUCOMTR-MCNC: 174 MG/DL — HIGH (ref 70–99)
GLUCOSE BLDC GLUCOMTR-MCNC: 183 MG/DL — HIGH (ref 70–99)
GLUCOSE BLDC GLUCOMTR-MCNC: 198 MG/DL — HIGH (ref 70–99)
LEVETIRACETAM SERPL-MCNC: 29.1 UG/ML — SIGNIFICANT CHANGE UP (ref 10–40)

## 2023-02-20 PROCEDURE — 99233 SBSQ HOSP IP/OBS HIGH 50: CPT

## 2023-02-20 RX ADMIN — ROBINUL 1 MILLIGRAM(S): 0.2 INJECTION INTRAMUSCULAR; INTRAVENOUS at 05:10

## 2023-02-20 RX ADMIN — APIXABAN 5 MILLIGRAM(S): 2.5 TABLET, FILM COATED ORAL at 05:10

## 2023-02-20 RX ADMIN — Medication 81 MILLIGRAM(S): at 11:14

## 2023-02-20 RX ADMIN — INSULIN GLARGINE 10 UNIT(S): 100 INJECTION, SOLUTION SUBCUTANEOUS at 22:19

## 2023-02-20 RX ADMIN — Medication 1 APPLICATION(S): at 05:11

## 2023-02-20 RX ADMIN — Medication 1 APPLICATION(S): at 17:06

## 2023-02-20 RX ADMIN — POLYETHYLENE GLYCOL 3350 17 GRAM(S): 17 POWDER, FOR SOLUTION ORAL at 11:13

## 2023-02-20 RX ADMIN — ATORVASTATIN CALCIUM 20 MILLIGRAM(S): 80 TABLET, FILM COATED ORAL at 21:35

## 2023-02-20 RX ADMIN — Medication 1 APPLICATION(S): at 05:10

## 2023-02-20 RX ADMIN — Medication 1: at 17:38

## 2023-02-20 RX ADMIN — BUDESONIDE AND FORMOTEROL FUMARATE DIHYDRATE 2 PUFF(S): 160; 4.5 AEROSOL RESPIRATORY (INHALATION) at 17:14

## 2023-02-20 RX ADMIN — SCOPALAMINE 1 PATCH: 1 PATCH, EXTENDED RELEASE TRANSDERMAL at 19:55

## 2023-02-20 RX ADMIN — Medication 1: at 05:49

## 2023-02-20 RX ADMIN — Medication 25 MILLIGRAM(S): at 17:15

## 2023-02-20 RX ADMIN — CHLORHEXIDINE GLUCONATE 1 APPLICATION(S): 213 SOLUTION TOPICAL at 05:10

## 2023-02-20 RX ADMIN — Medication 1000 MILLIGRAM(S): at 21:26

## 2023-02-20 RX ADMIN — ROBINUL 1 MILLIGRAM(S): 0.2 INJECTION INTRAMUSCULAR; INTRAVENOUS at 17:15

## 2023-02-20 RX ADMIN — Medication 750 MILLIGRAM(S): at 07:36

## 2023-02-20 RX ADMIN — Medication 1 APPLICATION(S): at 17:14

## 2023-02-20 RX ADMIN — LEVETIRACETAM 1500 MILLIGRAM(S): 250 TABLET, FILM COATED ORAL at 05:09

## 2023-02-20 RX ADMIN — Medication 1: at 11:12

## 2023-02-20 RX ADMIN — Medication 1 APPLICATION(S): at 17:15

## 2023-02-20 RX ADMIN — LEVETIRACETAM 1500 MILLIGRAM(S): 250 TABLET, FILM COATED ORAL at 17:14

## 2023-02-20 RX ADMIN — APIXABAN 5 MILLIGRAM(S): 2.5 TABLET, FILM COATED ORAL at 17:15

## 2023-02-20 RX ADMIN — Medication 1 APPLICATION(S): at 17:05

## 2023-02-20 RX ADMIN — SCOPALAMINE 1 PATCH: 1 PATCH, EXTENDED RELEASE TRANSDERMAL at 07:38

## 2023-02-20 RX ADMIN — Medication 1 MILLIGRAM(S): at 11:14

## 2023-02-20 RX ADMIN — PREGABALIN 1000 MICROGRAM(S): 225 CAPSULE ORAL at 11:14

## 2023-02-20 RX ADMIN — BUDESONIDE AND FORMOTEROL FUMARATE DIHYDRATE 2 PUFF(S): 160; 4.5 AEROSOL RESPIRATORY (INHALATION) at 06:55

## 2023-02-20 RX ADMIN — SENNA PLUS 2 TABLET(S): 8.6 TABLET ORAL at 21:26

## 2023-02-20 NOTE — PROGRESS NOTE ADULT - ASSESSMENT
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 ACLS for PEA arrest and ROSC returned after 5 minutes. Cardiac arrest possibly secondary to severe hypoglycemia from eating less and not tracking blood sugar carefully. Hospital course complicated by 1) prolonged hypoxemic respiratory failure. Difficult extubation requiring s/p trach and peg 2) left upper extremity/left subclavian DVT and placed on Eliquis 3) multiple infections (Enterococcal faecalis cellulitis, infected left hand hematoma, tracheobronchitis secondary to citrobacter 4) tonic clonic seizure - on Keppra / Depakote - currently undergoing reevaluation by NEUROLOGY 5) fever of 102 on 1/19. INFECTIOUS DISEASE reconsulted and pan culture ordered.    # Acute/Chronic Respiratory failure w/ Hypoxia and Hypercapnia/ COPD, now trach-vent dependent.  - S/p intubation; failed extubation requiring s/p trach and peg   - PMV (Passy Ramesh Valve) placed on 02/15/2023.  - tolerating Trach collar, vent discontinued; continue weaning as per Pulmonary   - cont w/ Symbicort, Duonebs prn  - Surgery on board, s/p Trach change to #4 fenestrated tracheostomy placed 1/30. Replaced on 02/14/2023.  - Capping trial per pulmonary this week.    - Had VFSS/MBS eval on 02/16/2023: Recommended Puree with thin liquids. Ordered.  - Frequent suction.  - Management as per Pulmonary.    # Myoclonic Seizure   - EEG: Myoclonic seizure  - Neurology on board   - cont w/ Keppra and Depakote.  - Myoclonic episode to LLE and LUE noted by his son.  - Mg 2.3 and K 4.1.  - Depakote level 55 (normal ); Keppra level pending.  - D/w Dr Loo (02/18/2023): recommended increase only the evening dose of Depakote from 750 mg to 1000 mg. and continue 750 mg in am.  - Changes made yesterday, on 02/18/2023.    # S/p Cardiac Arrest, acute metabolic encephalopathy due to severe hypoglycemia.  - s/p ACLS w/ ROSC after 5 min   - 2nd degree heart block, s/p PPM in place.  - ECHO Takotsubo cardiomyopathy. EF 50-55%, LVH, mild pHTN    - s/p ICU course; Hemodynamically stable now  - cont w/ Metoprolol, ASA and atorvastatin.    # Multiple infectious Disease   - tracheo-bronchitis secondary to citrobacter?? - resolved.  - enterococcal faecalis cellulitis - resolved   - infected left hand hematoma - s/p bedside debridement 12/13 w/ hand surgery   - monitor off abx              # Occlusive Left subclavian thrombosis   - LUE venous duplex : occlusive thrombus in the left mid subclavian vein with partial slow flow detected in the lateral subclavian vein.  - LUE arterial Duplex neg   - cont w/ Eliquis     # DM2   - A1c 8.8%  - cont w/ Lantus   - cont w/ FS monitoring w/ insulin s/s coverage     # HTN, Cad s/p PCI w/ stent/ CABG and HPL.  - cont with ASA, Metoprolol and Atorvastatin     # Anemia of Chronic Disease  - h/h stable, will cont  to monitor   - Hb 10.9, stable.    # Stage 4 decubiti sacrum wound  - s/p debridement 12/19  -cont w/ wound care as recommended     # Dysphagia/ Moderate Protein Calorie Malnutrition  - Peg tube in place   - continue with tube feeding at goal as tolerated.     # Constipation  - cont w/ senna, Miralax prn     DVT prophylaxis: Eliquis   Disposition: Patient has no insurance and Family in process of getting guardianship. Patient will then need placement.  CM/SW aware.    D/w his son Mr. Barlow at bedside, and he verbalized understanding.

## 2023-02-20 NOTE — PROGRESS NOTE ADULT - SUBJECTIVE AND OBJECTIVE BOX
Seen and examined at bedside.  Feeling better.  Son at bedside.          Physical Exam:  GENERAL: stable, in bed, more alert today. PEG feeds in place.   NECK: supple, No JVD, Trach collar in place. PMV was placed on 02/15/2023.  CHEST/LUNG: clear to auscultation bilaterally; few b/l rhonchi, or wheezing present.  HEART: normal S1, S2  ABDOMEN: BS+, soft, ND, NT   EXTREMITIES:  pulses palpable; no clubbing, cyanosis, or edema b/l LEs.  Skin: Sacral stage 3 decubitus ulcer.      MEDICATIONS  (STANDING):  apixaban 5 milliGRAM(s) Oral every 12 hours  aspirin  chewable 81 milliGRAM(s) Oral daily  atorvastatin 20 milliGRAM(s) Oral at bedtime  bacitracin   Ointment 1 Application(s) Topical two times a day  bacitracin   Ointment 1 Application(s) Topical two times a day  budesonide 160 MICROgram(s)/formoterol 4.5 MICROgram(s) Inhaler 2 Puff(s) Inhalation two times a day  chlorhexidine 2% Cloths 1 Application(s) Topical <User Schedule>  collagenase Ointment 1 Application(s) Topical two times a day  cyanocobalamin 1000 MICROGram(s) Oral daily  dextrose 5%. 1000 milliLiter(s) (100 mL/Hr) IV Continuous <Continuous>  dextrose 5%. 1000 milliLiter(s) (50 mL/Hr) IV Continuous <Continuous>  dextrose 50% Injectable 25 Gram(s) IV Push once  dextrose 50% Injectable 12.5 Gram(s) IV Push once  dextrose 50% Injectable 25 Gram(s) IV Push once  diphenhydrAMINE Injectable 50 milliGRAM(s) IV Push once  folic acid 1 milliGRAM(s) Oral daily  glucagon  Injectable 1 milliGRAM(s) IntraMuscular once  glycopyrrolate 1 milliGRAM(s) Oral two times a day  insulin glargine Injectable (LANTUS) 10 Unit(s) SubCutaneous at bedtime  insulin lispro (ADMELOG) corrective regimen sliding scale   SubCutaneous every 6 hours  levETIRAcetam  Solution 1500 milliGRAM(s) Oral two times a day  metoprolol tartrate 25 milliGRAM(s) Enteral Tube two times a day  polyethylene glycol 3350 17 Gram(s) Oral daily  scopolamine 1 mG/72 Hr(s) Patch 1 Patch Transdermal every 72 hours  senna 2 Tablet(s) Oral at bedtime  silver sulfADIAZINE 1% Cream 1 Application(s) Topical two times a day  valproic  acid Syrup 1000 milliGRAM(s) Oral <User Schedule>  valproic  acid Syrup 750 milliGRAM(s) Oral <User Schedule>  vitamin A &amp; D Ointment 1 Application(s) Topical two times a day          LABS:

## 2023-02-21 LAB
GLUCOSE BLDC GLUCOMTR-MCNC: 134 MG/DL — HIGH (ref 70–99)
GLUCOSE BLDC GLUCOMTR-MCNC: 143 MG/DL — HIGH (ref 70–99)
GLUCOSE BLDC GLUCOMTR-MCNC: 150 MG/DL — HIGH (ref 70–99)
GLUCOSE BLDC GLUCOMTR-MCNC: 174 MG/DL — HIGH (ref 70–99)
GLUCOSE BLDC GLUCOMTR-MCNC: 183 MG/DL — HIGH (ref 70–99)

## 2023-02-21 PROCEDURE — 99233 SBSQ HOSP IP/OBS HIGH 50: CPT

## 2023-02-21 PROCEDURE — 99232 SBSQ HOSP IP/OBS MODERATE 35: CPT

## 2023-02-21 RX ADMIN — SCOPALAMINE 1 PATCH: 1 PATCH, EXTENDED RELEASE TRANSDERMAL at 00:39

## 2023-02-21 RX ADMIN — Medication 25 MILLIGRAM(S): at 05:10

## 2023-02-21 RX ADMIN — LEVETIRACETAM 1500 MILLIGRAM(S): 250 TABLET, FILM COATED ORAL at 17:40

## 2023-02-21 RX ADMIN — SENNA PLUS 2 TABLET(S): 8.6 TABLET ORAL at 21:45

## 2023-02-21 RX ADMIN — Medication 1 APPLICATION(S): at 05:13

## 2023-02-21 RX ADMIN — Medication 1 APPLICATION(S): at 17:10

## 2023-02-21 RX ADMIN — APIXABAN 5 MILLIGRAM(S): 2.5 TABLET, FILM COATED ORAL at 05:06

## 2023-02-21 RX ADMIN — Medication 750 MILLIGRAM(S): at 07:25

## 2023-02-21 RX ADMIN — LEVETIRACETAM 1500 MILLIGRAM(S): 250 TABLET, FILM COATED ORAL at 05:06

## 2023-02-21 RX ADMIN — ROBINUL 1 MILLIGRAM(S): 0.2 INJECTION INTRAMUSCULAR; INTRAVENOUS at 05:07

## 2023-02-21 RX ADMIN — Medication 1 APPLICATION(S): at 05:12

## 2023-02-21 RX ADMIN — APIXABAN 5 MILLIGRAM(S): 2.5 TABLET, FILM COATED ORAL at 17:40

## 2023-02-21 RX ADMIN — CHLORHEXIDINE GLUCONATE 1 APPLICATION(S): 213 SOLUTION TOPICAL at 05:05

## 2023-02-21 RX ADMIN — Medication 1: at 00:25

## 2023-02-21 RX ADMIN — INSULIN GLARGINE 10 UNIT(S): 100 INJECTION, SOLUTION SUBCUTANEOUS at 21:46

## 2023-02-21 RX ADMIN — Medication 1 APPLICATION(S): at 05:11

## 2023-02-21 RX ADMIN — Medication 1 APPLICATION(S): at 05:06

## 2023-02-21 RX ADMIN — BUDESONIDE AND FORMOTEROL FUMARATE DIHYDRATE 2 PUFF(S): 160; 4.5 AEROSOL RESPIRATORY (INHALATION) at 05:12

## 2023-02-21 RX ADMIN — ATORVASTATIN CALCIUM 20 MILLIGRAM(S): 80 TABLET, FILM COATED ORAL at 21:48

## 2023-02-21 RX ADMIN — Medication 81 MILLIGRAM(S): at 11:18

## 2023-02-21 RX ADMIN — BUDESONIDE AND FORMOTEROL FUMARATE DIHYDRATE 2 PUFF(S): 160; 4.5 AEROSOL RESPIRATORY (INHALATION) at 17:29

## 2023-02-21 RX ADMIN — Medication 1 MILLIGRAM(S): at 11:19

## 2023-02-21 RX ADMIN — ROBINUL 1 MILLIGRAM(S): 0.2 INJECTION INTRAMUSCULAR; INTRAVENOUS at 17:39

## 2023-02-21 RX ADMIN — SCOPALAMINE 1 PATCH: 1 PATCH, EXTENDED RELEASE TRANSDERMAL at 00:24

## 2023-02-21 RX ADMIN — Medication 1 APPLICATION(S): at 17:45

## 2023-02-21 RX ADMIN — SCOPALAMINE 1 PATCH: 1 PATCH, EXTENDED RELEASE TRANSDERMAL at 20:36

## 2023-02-21 RX ADMIN — POLYETHYLENE GLYCOL 3350 17 GRAM(S): 17 POWDER, FOR SOLUTION ORAL at 11:19

## 2023-02-21 RX ADMIN — SCOPALAMINE 1 PATCH: 1 PATCH, EXTENDED RELEASE TRANSDERMAL at 07:16

## 2023-02-21 RX ADMIN — PREGABALIN 1000 MICROGRAM(S): 225 CAPSULE ORAL at 11:19

## 2023-02-21 RX ADMIN — Medication 1000 MILLIGRAM(S): at 21:44

## 2023-02-21 RX ADMIN — Medication 25 MILLIGRAM(S): at 17:40

## 2023-02-21 NOTE — PROGRESS NOTE ADULT - SUBJECTIVE AND OBJECTIVE BOX
*covering for Dr. Crawford pulmonary    24 hr events:  no acute events  tolerating passy cordelia valve        ## ROS:  [x] limited due to ability to speak  denies SOB  denies CP      ## Labs:  no new labs        ## Medications:  metoprolol tartrate 25 milliGRAM(s) Enteral Tube two times a day    albuterol/ipratropium for Nebulization 3 milliLiter(s) Nebulizer every 6 hours PRN  budesonide 160 MICROgram(s)/formoterol 4.5 MICROgram(s) Inhaler 2 Puff(s) Inhalation two times a day  diphenhydrAMINE Injectable 50 milliGRAM(s) IV Push once    atorvastatin 20 milliGRAM(s) Oral at bedtime  dextrose 50% Injectable 25 Gram(s) IV Push once  dextrose 50% Injectable 12.5 Gram(s) IV Push once  dextrose 50% Injectable 25 Gram(s) IV Push once  dextrose Oral Gel 15 Gram(s) Oral once PRN  glucagon  Injectable 1 milliGRAM(s) IntraMuscular once  insulin glargine Injectable (LANTUS) 10 Unit(s) SubCutaneous at bedtime  insulin lispro (ADMELOG) corrective regimen sliding scale   SubCutaneous every 6 hours    apixaban 5 milliGRAM(s) Oral every 12 hours  aspirin  chewable 81 milliGRAM(s) Oral daily    aluminum hydroxide/magnesium hydroxide/simethicone Suspension 30 milliLiter(s) Oral every 4 hours PRN  glycopyrrolate 1 milliGRAM(s) Oral two times a day  polyethylene glycol 3350 17 Gram(s) Oral daily  senna 2 Tablet(s) Oral at bedtime    acetaminophen     Tablet .. 650 milliGRAM(s) Oral every 6 hours PRN  levETIRAcetam  Solution 1500 milliGRAM(s) Oral two times a day  scopolamine 1 mG/72 Hr(s) Patch 1 Patch Transdermal every 72 hours  valproic  acid Syrup 1000 milliGRAM(s) Oral <User Schedule>  valproic  acid Syrup 750 milliGRAM(s) Oral <User Schedule>      ## Vitals:  T(C): 36.8 (02-21-23 @ 15:57), Max: 36.8 (02-21-23 @ 04:37)  HR: 67 (02-21-23 @ 15:57) (63 - 67)  BP: 129/79 (02-21-23 @ 15:57) (123/76 - 145/76)  RR: 18 (02-21-23 @ 15:57) (18 - 20)  SpO2: 99% (02-21-23 @ 17:29) (97% - 100%)        02-20 @ 07:01  -  02-21 @ 07:00  --------------------------------------------------------  IN: 1080 mL / OUT: 200 mL / NET: 880 mL          ## P/E:  Gen: lying comfortably in bed in no apparent distress  HEENT: EOMI, sclera white, trach in place  Resp: CTA B/L , no wheeze, no rhonchi  CVS: RRR  Abd: soft NT/ND +BS + PEG  Ext: no c/c/e  Neuro: awake, alert, follows commands          CODE STATUS: [x ] full code  [ ] DNR  [ ] DNI  [ ] [facility]  Goals of care discussion: [x ] yes

## 2023-02-21 NOTE — PHYSICAL THERAPY INITIAL EVALUATION ADULT - PHYSICAL ASSIST/NONPHYSICAL ASSIST, REHAB EVAL
verbal cues/nonverbal cues (demo/gestures)/2 person assist
2 person assist
supervision/verbal cues/nonverbal cues (demo/gestures)/2 person assist
supervision/verbal cues/nonverbal cues (demo/gestures)/1 person assist
1 person assist
verbal cues/nonverbal cues (demo/gestures)/2 person assist

## 2023-02-21 NOTE — PHYSICAL THERAPY INITIAL EVALUATION ADULT - PERTINENT HX OF CURRENT PROBLEM, REHAB EVAL
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 ACLS for PEA arrest and ROSC returned after 5 minutes. Cardiac arrest possibly secondary to severe hypoglycemia from eating less and not tracking blood sugar carefully. Hospital course complicated by 1) prolonged hypoxemic respiratory failure. Difficult extubation requiring s/p trach and peg 2) left upper extremity/left subclavian DVT and placed on eliquis 3) multiple infections (Enterococcal faecalis cellulitis, infected left hand hematoma, tracheobronchitis secondary to citrobacter 4) tonic clonic seizure - on Keppra / Depakoate  5) fever.

## 2023-02-21 NOTE — PROGRESS NOTE ADULT - ASSESSMENT
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  Post arrest noted to have generalized tonic-clonic seizures confirmed on EEG. Started on antiepileptics. Hospital course complicated by failure to wean s/p trach and peg 12/5/22, left upper extremity DVT initiated on eliquis, fevers with multiple infections due to enterococcal faecalis cellulitis, infected left hand hematoma s/p debridement 12/13, citrobacter and enterobacter cloacae tracheobronchitis, ecoli UTI. Trach changed and downsized by surgery on 1/30  to #4 fenestrated cuffed trach    DX: cardiac arrest, respiratory arrest / acute hypoxic respiratory failure requiring intubation, failure to wean s/p trach and PEG, new onset tonic clonic seizure, hypoglycemia with underlying DM, ELMER on CKD,with ATN, shock liver (resolved), takosubo cardiomyopathy, left arm DVT, L hand hematoma and cellulitis, tracheobronchitis, UTI      - tolerating size 4 fenestrated cuffed trach (changed/downsized 1/30)   - tolerating passy cordelia valve  - secretions seem to be improving  - on scopolamine patch for secretions  - will attempt capping trial if respiratory status remains stable  - has been off mechanical vent support since 1/23  - maintaining O2 sat > 90%  - passed swallow eval, ?attempt trial of po intake

## 2023-02-21 NOTE — PHYSICAL THERAPY INITIAL EVALUATION ADULT - PASSIVE RANGE OF MOTION EXAMINATION, REHAB EVAL
bilateral lower extremity Passive ROM was WFL (within functional limits)
complains of pain/discomfort
bilateral lower extremity Passive ROM was WFL (within functional limits)
bilateral upper extremity Passive ROM was WFL (within functional limits)/bilateral lower extremity Passive ROM was WFL (within functional limits)

## 2023-02-21 NOTE — PROGRESS NOTE ADULT - SUBJECTIVE AND OBJECTIVE BOX
Seen and examined at bedside.  Feeling better.  No any acute overnight issue.  Afebrile.      Physical Exam:  GENERAL: stable, in bed, more alert today. PEG feeds in place.   NECK: supple, No JVD, Trach collar in place. PMV was placed on 02/15/2023.  CHEST/LUNG: clear to auscultation bilaterally; few b/l rhonchi, or wheezing present.  HEART: normal S1, S2  ABDOMEN: BS+, soft, ND, NT   EXTREMITIES:  pulses palpable; no clubbing, cyanosis, or edema b/l LEs.  Skin: Sacral stage 3 decubitus ulcer.      MEDICATIONS  (STANDING):  apixaban 5 milliGRAM(s) Oral every 12 hours  aspirin  chewable 81 milliGRAM(s) Oral daily  atorvastatin 20 milliGRAM(s) Oral at bedtime  bacitracin   Ointment 1 Application(s) Topical two times a day  bacitracin   Ointment 1 Application(s) Topical two times a day  budesonide 160 MICROgram(s)/formoterol 4.5 MICROgram(s) Inhaler 2 Puff(s) Inhalation two times a day  chlorhexidine 2% Cloths 1 Application(s) Topical <User Schedule>  collagenase Ointment 1 Application(s) Topical two times a day  cyanocobalamin 1000 MICROGram(s) Oral daily  dextrose 5%. 1000 milliLiter(s) (100 mL/Hr) IV Continuous <Continuous>  dextrose 5%. 1000 milliLiter(s) (50 mL/Hr) IV Continuous <Continuous>  dextrose 50% Injectable 25 Gram(s) IV Push once  dextrose 50% Injectable 12.5 Gram(s) IV Push once  dextrose 50% Injectable 25 Gram(s) IV Push once  diphenhydrAMINE Injectable 50 milliGRAM(s) IV Push once  folic acid 1 milliGRAM(s) Oral daily  glucagon  Injectable 1 milliGRAM(s) IntraMuscular once  glycopyrrolate 1 milliGRAM(s) Oral two times a day  insulin glargine Injectable (LANTUS) 10 Unit(s) SubCutaneous at bedtime  insulin lispro (ADMELOG) corrective regimen sliding scale   SubCutaneous every 6 hours  levETIRAcetam  Solution 1500 milliGRAM(s) Oral two times a day  metoprolol tartrate 25 milliGRAM(s) Enteral Tube two times a day  polyethylene glycol 3350 17 Gram(s) Oral daily  scopolamine 1 mG/72 Hr(s) Patch 1 Patch Transdermal every 72 hours  senna 2 Tablet(s) Oral at bedtime  silver sulfADIAZINE 1% Cream 1 Application(s) Topical two times a day  valproic  acid Syrup 1000 milliGRAM(s) Oral <User Schedule>  valproic  acid Syrup 750 milliGRAM(s) Oral <User Schedule>  vitamin A &amp; D Ointment 1 Application(s) Topical two times a day          LABS:  No new labs.

## 2023-02-21 NOTE — PHYSICAL THERAPY INITIAL EVALUATION ADULT - PHYSICAL ASSIST/NONPHYSICAL ASSIST: SCOOT/BRIDGE, REHAB EVAL
verbal cues/nonverbal cues (demo/gestures)/2 person assist
supervision/verbal cues/nonverbal cues (demo/gestures)/2 person assist
1 person assist
supervision/verbal cues/nonverbal cues (demo/gestures)/2 person assist
verbal cues/nonverbal cues (demo/gestures)/2 person assist

## 2023-02-21 NOTE — PROGRESS NOTE ADULT - ASSESSMENT
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 ACLS for PEA arrest and ROSC returned after 5 minutes. Cardiac arrest possibly secondary to severe hypoglycemia from eating less and not tracking blood sugar carefully. Hospital course complicated by 1) prolonged hypoxemic respiratory failure. Difficult extubation requiring s/p trach and peg 2) left upper extremity/left subclavian DVT and placed on Eliquis 3) multiple infections (Enterococcal faecalis cellulitis, infected left hand hematoma, tracheobronchitis secondary to citrobacter 4) tonic clonic seizure - on Keppra / Depakote - currently undergoing reevaluation by NEUROLOGY 5) fever of 102 on 1/19. INFECTIOUS DISEASE reconsulted and pan culture ordered.    # Acute/Chronic Respiratory failure w/ Hypoxia and Hypercapnia/ COPD, now trach-vent dependent.  - S/p intubation; failed extubation requiring s/p trach and peg   - PMV (Passy Ramesh Valve) placed on 02/15/2023.  - tolerating Trach collar, vent discontinued; continue weaning as per Pulmonary   - cont w/ Symbicort, Duonebs prn  - Surgery on board, s/p Trach change to #4 fenestrated tracheostomy placed 1/30. Replaced on 02/14/2023.  - Capping trial per pulmonary this week.    - Had VFSS/MBS eval on 02/16/2023: Recommended Puree with thin liquids. Ordered.  - Frequent suction.  - Management as per Pulmonary.    # Myoclonic Seizure   - EEG: Myoclonic seizure  - Neurology on board   - cont w/ Keppra and Depakote.  - Myoclonic episode to LLE and LUE noted by his son.  - Mg 2.3 and K 4.1.  - Depakote level 55 (normal ); Keppra level pending.  - D/w Dr Loo (02/18/2023): recommended increase only the evening dose of Depakote from 750 mg to 1000 mg. and continue 750 mg in am.  - Changes made yesterday, on 02/18/2023.    # S/p Cardiac Arrest, acute metabolic encephalopathy due to severe hypoglycemia.  - s/p ACLS w/ ROSC after 5 min   - 2nd degree heart block, s/p PPM in place.  - ECHO Takotsubo cardiomyopathy. EF 50-55%, LVH, mild pHTN    - s/p ICU course; Hemodynamically stable now  - cont w/ Metoprolol, ASA and atorvastatin.    # Multiple infectious Disease   - tracheo-bronchitis secondary to citrobacter?? - resolved.  - enterococcal faecalis cellulitis - resolved   - infected left hand hematoma - s/p bedside debridement 12/13 w/ hand surgery   - monitor off abx              # Occlusive Left subclavian thrombosis   - LUE venous duplex : occlusive thrombus in the left mid subclavian vein with partial slow flow detected in the lateral subclavian vein.  - LUE arterial Duplex neg   - cont w/ Eliquis     # DM2   - A1c 8.8%  - cont w/ Lantus   - cont w/ FS monitoring w/ insulin s/s coverage     # HTN, Cad s/p PCI w/ stent/ CABG and HPL.  - cont with ASA, Metoprolol and Atorvastatin     # Anemia of Chronic Disease  - h/h stable, will cont  to monitor   - Hb 10.9, stable.    # Stage 3-4 sacral decubiti wound  - s/p debridement 12/19  -cont w/ wound care as recommended     # Dysphagia/ Moderate Protein Calorie Malnutrition  - Peg tube in place   - continue with tube feeding at goal as tolerated.     # Constipation  - cont w/ senna, Miralax prn     DVT prophylaxis: Eliquis.    Disposition: Patient has no insurance and Family in process of getting guardianship. Patient will then need placement.  CM/SW aware.

## 2023-02-21 NOTE — PHYSICAL THERAPY INITIAL EVALUATION ADULT - LEVEL OF INDEPENDENCE: SCOOT/BRIDGE, REHAB EVAL
maximum assist (25% patients effort)

## 2023-02-22 LAB
GLUCOSE BLDC GLUCOMTR-MCNC: 145 MG/DL — HIGH (ref 70–99)
GLUCOSE BLDC GLUCOMTR-MCNC: 153 MG/DL — HIGH (ref 70–99)
GLUCOSE BLDC GLUCOMTR-MCNC: 159 MG/DL — HIGH (ref 70–99)
GLUCOSE BLDC GLUCOMTR-MCNC: 163 MG/DL — HIGH (ref 70–99)
GLUCOSE BLDC GLUCOMTR-MCNC: 93 MG/DL — SIGNIFICANT CHANGE UP (ref 70–99)

## 2023-02-22 PROCEDURE — 99233 SBSQ HOSP IP/OBS HIGH 50: CPT

## 2023-02-22 PROCEDURE — 99232 SBSQ HOSP IP/OBS MODERATE 35: CPT

## 2023-02-22 RX ADMIN — Medication 1 MILLIGRAM(S): at 11:24

## 2023-02-22 RX ADMIN — BUDESONIDE AND FORMOTEROL FUMARATE DIHYDRATE 2 PUFF(S): 160; 4.5 AEROSOL RESPIRATORY (INHALATION) at 17:28

## 2023-02-22 RX ADMIN — Medication 1 APPLICATION(S): at 05:37

## 2023-02-22 RX ADMIN — APIXABAN 5 MILLIGRAM(S): 2.5 TABLET, FILM COATED ORAL at 17:18

## 2023-02-22 RX ADMIN — SCOPALAMINE 1 PATCH: 1 PATCH, EXTENDED RELEASE TRANSDERMAL at 07:45

## 2023-02-22 RX ADMIN — Medication 1 APPLICATION(S): at 17:17

## 2023-02-22 RX ADMIN — ATORVASTATIN CALCIUM 20 MILLIGRAM(S): 80 TABLET, FILM COATED ORAL at 22:22

## 2023-02-22 RX ADMIN — Medication 1 APPLICATION(S): at 05:35

## 2023-02-22 RX ADMIN — INSULIN GLARGINE 10 UNIT(S): 100 INJECTION, SOLUTION SUBCUTANEOUS at 22:22

## 2023-02-22 RX ADMIN — Medication 25 MILLIGRAM(S): at 17:20

## 2023-02-22 RX ADMIN — LEVETIRACETAM 1500 MILLIGRAM(S): 250 TABLET, FILM COATED ORAL at 17:18

## 2023-02-22 RX ADMIN — ROBINUL 1 MILLIGRAM(S): 0.2 INJECTION INTRAMUSCULAR; INTRAVENOUS at 05:34

## 2023-02-22 RX ADMIN — Medication 1 APPLICATION(S): at 17:26

## 2023-02-22 RX ADMIN — ROBINUL 1 MILLIGRAM(S): 0.2 INJECTION INTRAMUSCULAR; INTRAVENOUS at 17:18

## 2023-02-22 RX ADMIN — APIXABAN 5 MILLIGRAM(S): 2.5 TABLET, FILM COATED ORAL at 05:34

## 2023-02-22 RX ADMIN — PREGABALIN 1000 MICROGRAM(S): 225 CAPSULE ORAL at 11:26

## 2023-02-22 RX ADMIN — Medication 25 MILLIGRAM(S): at 05:35

## 2023-02-22 RX ADMIN — Medication 1: at 05:50

## 2023-02-22 RX ADMIN — Medication 1000 MILLIGRAM(S): at 22:21

## 2023-02-22 RX ADMIN — SCOPALAMINE 1 PATCH: 1 PATCH, EXTENDED RELEASE TRANSDERMAL at 22:53

## 2023-02-22 RX ADMIN — BUDESONIDE AND FORMOTEROL FUMARATE DIHYDRATE 2 PUFF(S): 160; 4.5 AEROSOL RESPIRATORY (INHALATION) at 05:32

## 2023-02-22 RX ADMIN — CHLORHEXIDINE GLUCONATE 1 APPLICATION(S): 213 SOLUTION TOPICAL at 05:37

## 2023-02-22 RX ADMIN — Medication 81 MILLIGRAM(S): at 11:26

## 2023-02-22 RX ADMIN — LEVETIRACETAM 1500 MILLIGRAM(S): 250 TABLET, FILM COATED ORAL at 05:36

## 2023-02-22 RX ADMIN — Medication 750 MILLIGRAM(S): at 11:26

## 2023-02-22 NOTE — PROGRESS NOTE ADULT - SUBJECTIVE AND OBJECTIVE BOX
*Covering for Dr. Crawford pulmonary       24 hr events:  no acute events  still with secretions but able to expectorate through mouth  tolerating passy cordelia valve few hours a day      ## ROS:  [x ] limited due to trach dependence  denies SOB  + cough, expectorating  no CP  no abdominal pain  + loose BMs        ## Medications:  metoprolol tartrate 25 milliGRAM(s) Enteral Tube two times a day    albuterol/ipratropium for Nebulization 3 milliLiter(s) Nebulizer every 6 hours PRN  budesonide 160 MICROgram(s)/formoterol 4.5 MICROgram(s) Inhaler 2 Puff(s) Inhalation two times a day  diphenhydrAMINE Injectable 50 milliGRAM(s) IV Push once    atorvastatin 20 milliGRAM(s) Oral at bedtime  dextrose 50% Injectable 25 Gram(s) IV Push once  dextrose 50% Injectable 12.5 Gram(s) IV Push once  dextrose 50% Injectable 25 Gram(s) IV Push once  dextrose Oral Gel 15 Gram(s) Oral once PRN  glucagon  Injectable 1 milliGRAM(s) IntraMuscular once  insulin glargine Injectable (LANTUS) 10 Unit(s) SubCutaneous at bedtime  insulin lispro (ADMELOG) corrective regimen sliding scale   SubCutaneous every 6 hours    apixaban 5 milliGRAM(s) Oral every 12 hours  aspirin  chewable 81 milliGRAM(s) Oral daily    aluminum hydroxide/magnesium hydroxide/simethicone Suspension 30 milliLiter(s) Oral every 4 hours PRN  glycopyrrolate 1 milliGRAM(s) Oral two times a day  polyethylene glycol 3350 17 Gram(s) Oral daily  senna 2 Tablet(s) Oral at bedtime    acetaminophen     Tablet .. 650 milliGRAM(s) Oral every 6 hours PRN  levETIRAcetam  Solution 1500 milliGRAM(s) Oral two times a day  scopolamine 1 mG/72 Hr(s) Patch 1 Patch Transdermal every 72 hours  valproic  acid Syrup 1000 milliGRAM(s) Oral <User Schedule>  valproic  acid Syrup 750 milliGRAM(s) Oral <User Schedule>      LABS  no new labs      ## Vitals:  T(C): 36.6 (02-22-23 @ 16:15), Max: 36.6 (02-22-23 @ 04:45)  HR: 70 (02-22-23 @ 20:16) (65 - 72)  BP: 118/66 (02-22-23 @ 16:15) (110/70 - 125/71)  RR: 20 (02-22-23 @ 20:16) (18 - 20)  SpO2: 98% (02-22-23 @ 20:16) (97% - 99%)          ## P/E:  Gen: lying comfortably in bed in no apparent distress  HEENT:  EOMI, sclera white, trach in place  Resp: CTA B/L, no wheeze, no rhonchi  CVS: RRR  Abd: soft NT/ND +BS + PEG  Ext: no c/c/e  Neuro: awake, alert, follows commands, attempting to speak      CODE STATUS: [ x] full code  [ ] DNR  [ ] DNI  [ ] [facility]  Goals of care discussion: [x ] yes

## 2023-02-22 NOTE — PROGRESS NOTE ADULT - ASSESSMENT
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  Post arrest noted to have generalized tonic-clonic seizures confirmed on EEG. Started on antiepileptics. Hospital course complicated by failure to wean s/p trach and peg 12/5/22, left upper extremity DVT initiated on eliquis, fevers with multiple infections due to enterococcal faecalis cellulitis, infected left hand hematoma s/p debridement 12/13, citrobacter and enterobacter cloacae tracheobronchitis, ecoli UTI. Trach changed and downsized by surgery on 1/30  to #4 fenestrated cuffed trach    DX: cardiac arrest, respiratory arrest / acute hypoxic respiratory failure requiring intubation, failure to wean s/p trach and PEG, new onset tonic clonic seizure, hypoglycemia with underlying DM, ELMER on CKD,with ATN, shock liver (resolved), takosubo cardiomyopathy, left arm DVT, L hand hematoma and cellulitis, tracheobronchitis, UTI      - tolerating size 4 fenestrated cuffed trach (changed/downsized 1/30)   - tolerating passy cordelia valve  - secretions seem to be improving and pt expectorating  - on scopolamine patch for secretions  - will attempt capping trial tomorrow, son would like to be present at bedside for this  - has been off mechanical vent support since 1/23  - maintaining O2 sat > 90%  - passed swallow eval, ?attempt trial of po intake with PEG feeds to supplement

## 2023-02-22 NOTE — CHART NOTE - NSCHARTNOTEFT_GEN_A_CORE
Assessment:  Pt seen on 2C unit, on trach collar, G-rube infusing c Glucerna 1.2 @ 60 ml/hr.  Pt adm c dx of hypoglycemia, cardiac arrest, hypothermia, s/p critical care stay, hospital course complicated by prolonged hypoxemic respiratory failure, acute/chronic respiratory failure, acute metabolic encephalopathy due to severe hypoglycemia, s/p ELMER, shock liver, COPD, s/p trach, s/p trach change to fenestrated tracheostomy 01/30, replaced 02/14, capping trial per Pulmonary, s/p PEG, DVT, multiple infections,  myoclonic seizure, occlusive left subclavian thrombosis,  anemia of chronic disease, functional quadriplegia, sacral decubiti, dysphagia.  PMH include COPD, CAD, CABG, CHF, 2nd degree heart block, PPM, HTN, DM( was on Jardiance, metformin , Victoza, Lispro, Lantus PTA), CKD, CVA, BPH.    Factors impacting intake: [ ] none [ ] nausea  [ ] vomiting [ ] diarrhea [ ] constipation  [ ]chewing problems [x ] swallowing issues; VFSS/MBS; c recommendation for puree c thin liquid  [x ] other: on G-Tube    Diet Prescription: NPO c Tube Feeding, goal rate of Glucerna 1.2 @ 60 ml/hr + LPS 1 x day+ free water flush @ 30 ml/hr via pump    Intake:  Glucerna 1.2 @ 60 mL/hr x 24 hrs=1440 ml, 1728 oscar, 86 gm pro, 1166 mL free water, 120% RDIs + Liquid protein supplement 1 pkg=15 grams protein, 100 calories + additional 720 ml water daily from water flush= total of 1886 ml water daily       Current Weight: 02/13, 112.4 kg(?), 02/08, 89 kg, 01/19, 77.5 kg, 11/14/22, 82.2 kg(adm wt.)  % Weight Change: unable to ascertain due to questionable wt.  02/12, no edema noted  Pt appears to have visible moderate temple, orbital, buccal depletion.      Pertinent Medications: MEDICATIONS  (STANDING):  apixaban 5 milliGRAM(s) Oral every 12 hours  aspirin  chewable 81 milliGRAM(s) Oral daily  atorvastatin 20 milliGRAM(s) Oral at bedtime  bacitracin   Ointment 1 Application(s) Topical two times a day  bacitracin   Ointment 1 Application(s) Topical two times a day  budesonide 160 MICROgram(s)/formoterol 4.5 MICROgram(s) Inhaler 2 Puff(s) Inhalation two times a day  chlorhexidine 2% Cloths 1 Application(s) Topical <User Schedule>  cyanocobalamin 1000 MICROGram(s) Oral daily  dextrose 5%. 1000 milliLiter(s) (100 mL/Hr) IV Continuous <Continuous>  dextrose 5%. 1000 milliLiter(s) (50 mL/Hr) IV Continuous <Continuous>  dextrose 50% Injectable 25 Gram(s) IV Push once  dextrose 50% Injectable 12.5 Gram(s) IV Push once  dextrose 50% Injectable 25 Gram(s) IV Push once  diphenhydrAMINE Injectable 50 milliGRAM(s) IV Push once  folic acid 1 milliGRAM(s) Oral daily  glucagon  Injectable 1 milliGRAM(s) IntraMuscular once  glycopyrrolate 1 milliGRAM(s) Oral two times a day  insulin glargine Injectable (LANTUS) 10 Unit(s) SubCutaneous at bedtime  insulin lispro (ADMELOG) corrective regimen sliding scale   SubCutaneous every 6 hours  levETIRAcetam  Solution 1500 milliGRAM(s) Oral two times a day  metoprolol tartrate 25 milliGRAM(s) Enteral Tube two times a day  polyethylene glycol 3350 17 Gram(s) Oral daily  scopolamine 1 mG/72 Hr(s) Patch 1 Patch Transdermal every 72 hours  senna 2 Tablet(s) Oral at bedtime  silver sulfADIAZINE 1% Cream 1 Application(s) Topical two times a day  valproic  acid Syrup 1000 milliGRAM(s) Oral <User Schedule>  valproic  acid Syrup 750 milliGRAM(s) Oral <User Schedule>  vitamin A &amp; D Ointment 1 Application(s) Topical two times a day    MEDICATIONS  (PRN):  acetaminophen     Tablet .. 650 milliGRAM(s) Oral every 6 hours PRN Temp greater or equal to 38C (100.4F), Mild Pain (1 - 3)  albuterol/ipratropium for Nebulization 3 milliLiter(s) Nebulizer every 6 hours PRN Shortness of Breath and/or Wheezing  aluminum hydroxide/magnesium hydroxide/simethicone Suspension 30 milliLiter(s) Oral every 4 hours PRN Dyspepsia  dextrose Oral Gel 15 Gram(s) Oral once PRN Blood Glucose LESS THAN 70 milliGRAM(s)/deciliter    Pertinent Labs:    CAPILLARY BLOOD GLUCOSE      POCT Blood Glucose.: 153 mg/dL (22 Feb 2023 05:46)  POCT Blood Glucose.: 163 mg/dL (22 Feb 2023 01:04)  POCT Blood Glucose.: 174 mg/dL (21 Feb 2023 21:41)  POCT Blood Glucose.: 150 mg/dL (21 Feb 2023 17:19)  POCT Blood Glucose.: 143 mg/dL (21 Feb 2023 11:31)    Skin:   02/21; IAD: perineum  skin tears x 3  1. 02/17, right buttock, 2. 02/20 right buttock, 3. 01/20, left temporal region   Pressure ulcer x 1  1. 02/20; sacrum; healing stage IV      Estimated Needs:   [x ] no change since previous assessment (11/06 & 11/23)  [ ] recalculated:     Previous Nutrition Diagnosis: (12/23)  [x ] Malnutrition; moderate malnutrition in context of acute illness   Related to: increased protein energy needs in setting of cardiac arrest, acute respiratory failure, s/p ELMER, shock liver, pressure ulcers/wounds  As evidenced by: physical findings of mild muscle wasting & mild/moderate fat depletion   GOAL: pt to meet >75% protein-energy needs via tube feeding; met   Nutrition Diagnosis is [x ] ongoing  [ ] resolved [ ] not applicable       New Nutrition Diagnosis: [ x] not applicable     Interventions:   Recommend  [ x] Change Diet To: when pt is appropriate, consider trial of oral feeding, puree c thin liquids, consistent carbohydrate c evening snack, low sodium   [ ] Nutrition Supplement  [x ] Nutrition Support; change Tube feeding to nocturnal feeding c Glucerna 1.2 @ 60 ml/hr from 7pm-7am(12 hours) via G-Tube=720 ml, 864 calories, 43 grams protein, 580 ml free water   [x ] Other: aspiration precautions     Monitoring and Evaluation:   [ ] PO intake [ x ] Tolerance to diet prescription [ x ] weights [ x ] labs[ x ] follow up per protocol  [ ] other:

## 2023-02-22 NOTE — PROGRESS NOTE ADULT - SUBJECTIVE AND OBJECTIVE BOX
Seen and examined at bedside.  Feeling better.  Had diarrhea 3-4 times since yesterday.  Laxative stopped today.  Pulmonary following.  Awaiting placement.  Son at bedside.        Physical Exam:  GENERAL: stable, in bed, more alert today. PEG feeds in place.   NECK: supple, No JVD, Trach collar in place. PMV was placed on 02/15/2023.  CHEST/LUNG: clear to auscultation bilaterally; few b/l rhonchi, or wheezing present.  HEART: normal S1, S2  ABDOMEN: BS+, soft, ND, NT   EXTREMITIES:  pulses palpable; no clubbing, cyanosis, or edema b/l LEs.  Skin: Sacral stage 3 decubitus ulcer.      ICU Vital Signs Last 24 Hrs  T(C): 36.5 (22 Feb 2023 10:28), Max: 36.6 (22 Feb 2023 04:45)  T(F): 97.7 (22 Feb 2023 10:28), Max: 97.8 (22 Feb 2023 04:45)  HR: 69 (22 Feb 2023 10:28) (65 - 69)  BP: 110/70 (22 Feb 2023 10:28) (110/70 - 125/71)  BP(mean): --  ABP: --  ABP(mean): --  RR: 20 (22 Feb 2023 10:28) (18 - 20)  SpO2: 97% (22 Feb 2023 10:28) (97% - 99%)    O2 Parameters below as of 22 Feb 2023 10:28  Patient On (Oxygen Delivery Method): tracheostomy collar        MEDICATIONS  (STANDING):  apixaban 5 milliGRAM(s) Oral every 12 hours  aspirin  chewable 81 milliGRAM(s) Oral daily  atorvastatin 20 milliGRAM(s) Oral at bedtime  bacitracin   Ointment 1 Application(s) Topical two times a day  bacitracin   Ointment 1 Application(s) Topical two times a day  budesonide 160 MICROgram(s)/formoterol 4.5 MICROgram(s) Inhaler 2 Puff(s) Inhalation two times a day  chlorhexidine 2% Cloths 1 Application(s) Topical <User Schedule>  cyanocobalamin 1000 MICROGram(s) Oral daily  dextrose 5%. 1000 milliLiter(s) (100 mL/Hr) IV Continuous <Continuous>  dextrose 5%. 1000 milliLiter(s) (50 mL/Hr) IV Continuous <Continuous>  dextrose 50% Injectable 25 Gram(s) IV Push once  dextrose 50% Injectable 12.5 Gram(s) IV Push once  dextrose 50% Injectable 25 Gram(s) IV Push once  diphenhydrAMINE Injectable 50 milliGRAM(s) IV Push once  folic acid 1 milliGRAM(s) Oral daily  glucagon  Injectable 1 milliGRAM(s) IntraMuscular once  glycopyrrolate 1 milliGRAM(s) Oral two times a day  insulin glargine Injectable (LANTUS) 10 Unit(s) SubCutaneous at bedtime  insulin lispro (ADMELOG) corrective regimen sliding scale   SubCutaneous every 6 hours  levETIRAcetam  Solution 1500 milliGRAM(s) Oral two times a day  metoprolol tartrate 25 milliGRAM(s) Enteral Tube two times a day  polyethylene glycol 3350 17 Gram(s) Oral daily  scopolamine 1 mG/72 Hr(s) Patch 1 Patch Transdermal every 72 hours  senna 2 Tablet(s) Oral at bedtime  silver sulfADIAZINE 1% Cream 1 Application(s) Topical two times a day  valproic  acid Syrup 1000 milliGRAM(s) Oral <User Schedule>  valproic  acid Syrup 750 milliGRAM(s) Oral <User Schedule>  vitamin A &amp; D Ointment 1 Application(s) Topical two times a day

## 2023-02-22 NOTE — PROGRESS NOTE ADULT - ASSESSMENT
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 ACLS for PEA arrest and ROSC returned after 5 minutes. Cardiac arrest possibly secondary to severe hypoglycemia from eating less and not tracking blood sugar carefully. Hospital course complicated by 1) prolonged hypoxemic respiratory failure. Difficult extubation requiring s/p trach and peg 2) left upper extremity/left subclavian DVT and placed on Eliquis 3) multiple infections (Enterococcal faecalis cellulitis, infected left hand hematoma, tracheobronchitis secondary to citrobacter 4) tonic clonic seizure - on Keppra / Depakote - currently undergoing reevaluation by NEUROLOGY 5) fever of 102 on 1/19. INFECTIOUS DISEASE reconsulted and pan culture ordered.    # Acute/Chronic Respiratory failure w/ Hypoxia and Hypercapnia/ COPD, now trach-vent dependent.  - S/p intubation; failed extubation requiring s/p trach and peg   - PMV (Passy Ramesh Valve) placed on 02/15/2023.  - tolerating Trach collar, vent discontinued; continue weaning as per Pulmonary   - cont w/ Symbicort, Duonebs prn  - Surgery on board, s/p Trach change to #4 fenestrated tracheostomy placed 1/30. Replaced on 02/14/2023.  - Had VFSS/MBS eval on 02/16/2023: Recommended Puree with thin liquids. Ordered.  - Frequent suction.  -Capping trial per pulmonary this week.    - Management as per Pulmonary.    # Myoclonic Seizure   - EEG: Myoclonic seizure  - Neurology on board   - cont w/ Keppra and Depakote.  - Myoclonic episode to LLE and LUE noted by his son.  - Mg 2.3 and K 4.1.  - Depakote level 55 (normal ); Keppra level pending.  - D/w Dr Loo (02/18/2023): recommended increase only the evening dose of Depakote from 750 mg to 1000 mg. and continue 750 mg in am.  - Changes made yesterday, on 02/18/2023.    # S/p Cardiac Arrest, acute metabolic encephalopathy due to severe hypoglycemia.  - s/p ACLS w/ ROSC after 5 min   - 2nd degree heart block, s/p PPM in place.  - ECHO Takotsubo cardiomyopathy. EF 50-55%, LVH, mild pHTN    - s/p ICU course; Hemodynamically stable now  - cont w/ Metoprolol, ASA and atorvastatin.    # Multiple infectious Disease   - tracheo-bronchitis secondary to citrobacter?? - resolved.  - enterococcal faecalis cellulitis - resolved   - infected left hand hematoma - s/p bedside debridement 12/13 w/ hand surgery   - monitor off abx              # Occlusive Left subclavian thrombosis   - LUE venous duplex : occlusive thrombus in the left mid subclavian vein with partial slow flow detected in the lateral subclavian vein.  - LUE arterial Duplex neg   - cont w/ Eliquis     # DM2   - A1c 8.8%  - cont w/ Lantus   - cont w/ FS monitoring w/ insulin s/s coverage     # HTN, Cad s/p PCI w/ stent/ CABG and HPL.  - cont with ASA, Metoprolol and Atorvastatin     # Anemia of Chronic Disease  - h/h stable, will cont  to monitor   - Hb 10.9, stable.    # Stage 3-4 sacral decubiti wound  - s/p debridement 12/19  -cont w/ wound care as recommended     # Dysphagia/ Moderate Protein Calorie Malnutrition  - Peg tube in place   - continue with tube feeding at goal as tolerated.     # Constipation  - cont w/ senna, Miralax prn     DVT prophylaxis: Eliquis.    Disposition: Patient has no insurance and Family in process of getting guardianship. Patient will then need placement.  CM/SW aware.

## 2023-02-23 LAB
ALBUMIN SERPL ELPH-MCNC: 2.1 G/DL — LOW (ref 3.3–5)
ALP SERPL-CCNC: 82 U/L — SIGNIFICANT CHANGE UP (ref 40–120)
ALT FLD-CCNC: 15 U/L — SIGNIFICANT CHANGE UP (ref 12–78)
ANION GAP SERPL CALC-SCNC: 6 MMOL/L — SIGNIFICANT CHANGE UP (ref 5–17)
AST SERPL-CCNC: 12 U/L — LOW (ref 15–37)
BASOPHILS # BLD AUTO: 0.09 K/UL — SIGNIFICANT CHANGE UP (ref 0–0.2)
BASOPHILS NFR BLD AUTO: 0.8 % — SIGNIFICANT CHANGE UP (ref 0–2)
BILIRUB SERPL-MCNC: 0.2 MG/DL — SIGNIFICANT CHANGE UP (ref 0.2–1.2)
BUN SERPL-MCNC: 29 MG/DL — HIGH (ref 7–23)
CALCIUM SERPL-MCNC: 8.5 MG/DL — SIGNIFICANT CHANGE UP (ref 8.5–10.1)
CHLORIDE SERPL-SCNC: 100 MMOL/L — SIGNIFICANT CHANGE UP (ref 96–108)
CO2 SERPL-SCNC: 31 MMOL/L — SIGNIFICANT CHANGE UP (ref 22–31)
CREAT SERPL-MCNC: 0.77 MG/DL — SIGNIFICANT CHANGE UP (ref 0.5–1.3)
EGFR: 94 ML/MIN/1.73M2 — SIGNIFICANT CHANGE UP
EOSINOPHIL # BLD AUTO: 1.04 K/UL — HIGH (ref 0–0.5)
EOSINOPHIL NFR BLD AUTO: 9.5 % — HIGH (ref 0–6)
GLUCOSE BLDC GLUCOMTR-MCNC: 116 MG/DL — HIGH (ref 70–99)
GLUCOSE BLDC GLUCOMTR-MCNC: 130 MG/DL — HIGH (ref 70–99)
GLUCOSE BLDC GLUCOMTR-MCNC: 132 MG/DL — HIGH (ref 70–99)
GLUCOSE BLDC GLUCOMTR-MCNC: 134 MG/DL — HIGH (ref 70–99)
GLUCOSE SERPL-MCNC: 144 MG/DL — HIGH (ref 70–99)
HCT VFR BLD CALC: 35.3 % — LOW (ref 39–50)
HGB BLD-MCNC: 11.2 G/DL — LOW (ref 13–17)
IMM GRANULOCYTES NFR BLD AUTO: 0.4 % — SIGNIFICANT CHANGE UP (ref 0–0.9)
LYMPHOCYTES # BLD AUTO: 2.72 K/UL — SIGNIFICANT CHANGE UP (ref 1–3.3)
LYMPHOCYTES # BLD AUTO: 24.7 % — SIGNIFICANT CHANGE UP (ref 13–44)
MAGNESIUM SERPL-MCNC: 2.2 MG/DL — SIGNIFICANT CHANGE UP (ref 1.6–2.6)
MCHC RBC-ENTMCNC: 30.1 PG — SIGNIFICANT CHANGE UP (ref 27–34)
MCHC RBC-ENTMCNC: 31.7 G/DL — LOW (ref 32–36)
MCV RBC AUTO: 94.9 FL — SIGNIFICANT CHANGE UP (ref 80–100)
MONOCYTES # BLD AUTO: 0.9 K/UL — SIGNIFICANT CHANGE UP (ref 0–0.9)
MONOCYTES NFR BLD AUTO: 8.2 % — SIGNIFICANT CHANGE UP (ref 2–14)
NEUTROPHILS # BLD AUTO: 6.21 K/UL — SIGNIFICANT CHANGE UP (ref 1.8–7.4)
NEUTROPHILS NFR BLD AUTO: 56.4 % — SIGNIFICANT CHANGE UP (ref 43–77)
NRBC # BLD: 0 /100 WBCS — SIGNIFICANT CHANGE UP (ref 0–0)
PLATELET # BLD AUTO: 230 K/UL — SIGNIFICANT CHANGE UP (ref 150–400)
POTASSIUM SERPL-MCNC: 4.2 MMOL/L — SIGNIFICANT CHANGE UP (ref 3.5–5.3)
POTASSIUM SERPL-SCNC: 4.2 MMOL/L — SIGNIFICANT CHANGE UP (ref 3.5–5.3)
PROT SERPL-MCNC: 5.8 GM/DL — LOW (ref 6–8.3)
RBC # BLD: 3.72 M/UL — LOW (ref 4.2–5.8)
RBC # FLD: 14.4 % — SIGNIFICANT CHANGE UP (ref 10.3–14.5)
SODIUM SERPL-SCNC: 137 MMOL/L — SIGNIFICANT CHANGE UP (ref 135–145)
WBC # BLD: 11 K/UL — HIGH (ref 3.8–10.5)
WBC # FLD AUTO: 11 K/UL — HIGH (ref 3.8–10.5)

## 2023-02-23 PROCEDURE — 99232 SBSQ HOSP IP/OBS MODERATE 35: CPT

## 2023-02-23 PROCEDURE — 99233 SBSQ HOSP IP/OBS HIGH 50: CPT

## 2023-02-23 RX ADMIN — Medication 1 APPLICATION(S): at 18:01

## 2023-02-23 RX ADMIN — LEVETIRACETAM 1500 MILLIGRAM(S): 250 TABLET, FILM COATED ORAL at 17:59

## 2023-02-23 RX ADMIN — BUDESONIDE AND FORMOTEROL FUMARATE DIHYDRATE 2 PUFF(S): 160; 4.5 AEROSOL RESPIRATORY (INHALATION) at 05:53

## 2023-02-23 RX ADMIN — Medication 81 MILLIGRAM(S): at 12:15

## 2023-02-23 RX ADMIN — Medication 1 APPLICATION(S): at 05:55

## 2023-02-23 RX ADMIN — Medication 1 APPLICATION(S): at 19:00

## 2023-02-23 RX ADMIN — SCOPALAMINE 1 PATCH: 1 PATCH, EXTENDED RELEASE TRANSDERMAL at 23:54

## 2023-02-23 RX ADMIN — SCOPALAMINE 1 PATCH: 1 PATCH, EXTENDED RELEASE TRANSDERMAL at 20:04

## 2023-02-23 RX ADMIN — ROBINUL 1 MILLIGRAM(S): 0.2 INJECTION INTRAMUSCULAR; INTRAVENOUS at 17:59

## 2023-02-23 RX ADMIN — Medication 1 APPLICATION(S): at 07:09

## 2023-02-23 RX ADMIN — APIXABAN 5 MILLIGRAM(S): 2.5 TABLET, FILM COATED ORAL at 17:59

## 2023-02-23 RX ADMIN — Medication 1 APPLICATION(S): at 05:54

## 2023-02-23 RX ADMIN — LEVETIRACETAM 1500 MILLIGRAM(S): 250 TABLET, FILM COATED ORAL at 05:52

## 2023-02-23 RX ADMIN — SCOPALAMINE 1 PATCH: 1 PATCH, EXTENDED RELEASE TRANSDERMAL at 23:47

## 2023-02-23 RX ADMIN — Medication 1 APPLICATION(S): at 18:59

## 2023-02-23 RX ADMIN — APIXABAN 5 MILLIGRAM(S): 2.5 TABLET, FILM COATED ORAL at 05:54

## 2023-02-23 RX ADMIN — SENNA PLUS 2 TABLET(S): 8.6 TABLET ORAL at 21:19

## 2023-02-23 RX ADMIN — Medication 1 MILLIGRAM(S): at 12:14

## 2023-02-23 RX ADMIN — Medication 25 MILLIGRAM(S): at 05:52

## 2023-02-23 RX ADMIN — BUDESONIDE AND FORMOTEROL FUMARATE DIHYDRATE 2 PUFF(S): 160; 4.5 AEROSOL RESPIRATORY (INHALATION) at 17:56

## 2023-02-23 RX ADMIN — Medication 750 MILLIGRAM(S): at 08:16

## 2023-02-23 RX ADMIN — ATORVASTATIN CALCIUM 20 MILLIGRAM(S): 80 TABLET, FILM COATED ORAL at 21:44

## 2023-02-23 RX ADMIN — SCOPALAMINE 1 PATCH: 1 PATCH, EXTENDED RELEASE TRANSDERMAL at 05:56

## 2023-02-23 RX ADMIN — Medication 1000 MILLIGRAM(S): at 21:19

## 2023-02-23 RX ADMIN — INSULIN GLARGINE 10 UNIT(S): 100 INJECTION, SOLUTION SUBCUTANEOUS at 21:44

## 2023-02-23 RX ADMIN — PREGABALIN 1000 MICROGRAM(S): 225 CAPSULE ORAL at 12:14

## 2023-02-23 RX ADMIN — ROBINUL 1 MILLIGRAM(S): 0.2 INJECTION INTRAMUSCULAR; INTRAVENOUS at 05:53

## 2023-02-23 RX ADMIN — CHLORHEXIDINE GLUCONATE 1 APPLICATION(S): 213 SOLUTION TOPICAL at 05:54

## 2023-02-23 NOTE — PROGRESS NOTE ADULT - SUBJECTIVE AND OBJECTIVE BOX
CC: Patient is a 72y old  Male who presents with a chief complaint of s/p cardiac arrest, hypoglycemia (22 Feb 2023 20:52)      Patient seen and examined at bedside, No acute overnight events. Patient was capped      ROS: Denies fever, nausea, vomiting, chest pain, SOB, abdominal pain, diarrhea and constipation    Vital Sign  Vital Signs Last 24 Hrs  T(C): 36.4 (23 Feb 2023 11:24), Max: 36.8 (22 Feb 2023 23:50)  T(F): 97.6 (23 Feb 2023 11:24), Max: 98.2 (22 Feb 2023 23:50)  HR: 73 (23 Feb 2023 11:24) (66 - 73)  BP: 132/60 (23 Feb 2023 11:24) (118/66 - 132/60)  BP(mean): --  RR: 20 (23 Feb 2023 11:24) (18 - 20)  SpO2: 96% (23 Feb 2023 11:24) (96% - 99%)    Parameters below as of 23 Feb 2023 11:24  Patient On (Oxygen Delivery Method): tracheostomy collar        Physical Exam:   Gen: NAD  HEENT: NCAT, EOMI, PERRLA, Pupils_  CVS: RRR, +S1/S2, no murmurs, rubs or gallops appreciated  Lungs: CTAB, no wheeze, rales, rhonchi  Abdomen: +BS, soft, ND, NT. no palpable flank tenderness or mass, no CVA tenderness  Ext: No cyanosis, edema or calf tenderness  Neuro: AAOx3, no focal deficits.  Derm: Peg tube in placed,     Labs:                        11.2   11.00 )-----------( 230      ( 23 Feb 2023 08:20 )             35.3   02-23    137  |  100  |  29<H>  ----------------------------<  144<H>  4.2   |  31  |  0.77    Ca    8.5      23 Feb 2023 08:20  Mg     2.2     02-23    TPro  5.8<L>  /  Alb  2.1<L>  /  TBili  0.2  /  DBili  x   /  AST  12<L>  /  ALT  15  /  AlkPhos  82  02-23 02-22 @ 07:01 - 02-23 @ 07:00  --------------------------------------------------------  IN: 930 mL / OUT: 0 mL / NET: 930 mL        Radiology:  *Pull    Medications:  MEDICATIONS  (STANDING):  apixaban 5 milliGRAM(s) Oral every 12 hours  aspirin  chewable 81 milliGRAM(s) Oral daily  atorvastatin 20 milliGRAM(s) Oral at bedtime  bacitracin   Ointment 1 Application(s) Topical two times a day  bacitracin   Ointment 1 Application(s) Topical two times a day  budesonide 160 MICROgram(s)/formoterol 4.5 MICROgram(s) Inhaler 2 Puff(s) Inhalation two times a day  chlorhexidine 2% Cloths 1 Application(s) Topical <User Schedule>  cyanocobalamin 1000 MICROGram(s) Oral daily  dextrose 5%. 1000 milliLiter(s) (50 mL/Hr) IV Continuous <Continuous>  dextrose 5%. 1000 milliLiter(s) (100 mL/Hr) IV Continuous <Continuous>  dextrose 50% Injectable 25 Gram(s) IV Push once  dextrose 50% Injectable 12.5 Gram(s) IV Push once  dextrose 50% Injectable 25 Gram(s) IV Push once  diphenhydrAMINE Injectable 50 milliGRAM(s) IV Push once  folic acid 1 milliGRAM(s) Oral daily  glucagon  Injectable 1 milliGRAM(s) IntraMuscular once  glycopyrrolate 1 milliGRAM(s) Oral two times a day  insulin glargine Injectable (LANTUS) 10 Unit(s) SubCutaneous at bedtime  insulin lispro (ADMELOG) corrective regimen sliding scale   SubCutaneous every 6 hours  levETIRAcetam  Solution 1500 milliGRAM(s) Oral two times a day  metoprolol tartrate 25 milliGRAM(s) Enteral Tube two times a day  polyethylene glycol 3350 17 Gram(s) Oral daily  scopolamine 1 mG/72 Hr(s) Patch 1 Patch Transdermal every 72 hours  senna 2 Tablet(s) Oral at bedtime  silver sulfADIAZINE 1% Cream 1 Application(s) Topical two times a day  valproic  acid Syrup 1000 milliGRAM(s) Oral <User Schedule>  valproic  acid Syrup 750 milliGRAM(s) Oral <User Schedule>  vitamin A &amp; D Ointment 1 Application(s) Topical two times a day    MEDICATIONS  (PRN):  acetaminophen     Tablet .. 650 milliGRAM(s) Oral every 6 hours PRN Temp greater or equal to 38C (100.4F), Mild Pain (1 - 3)  albuterol/ipratropium for Nebulization 3 milliLiter(s) Nebulizer every 6 hours PRN Shortness of Breath and/or Wheezing  aluminum hydroxide/magnesium hydroxide/simethicone Suspension 30 milliLiter(s) Oral every 4 hours PRN Dyspepsia  dextrose Oral Gel 15 Gram(s) Oral once PRN Blood Glucose LESS THAN 70 milliGRAM(s)/deciliter

## 2023-02-23 NOTE — PROGRESS NOTE ADULT - ASSESSMENT
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  Post arrest noted to have generalized tonic-clonic seizures confirmed on EEG. Started on antiepileptics. Hospital course complicated by failure to wean s/p trach and peg 12/5/22, left upper extremity DVT initiated on eliquis, fevers with multiple infections due to enterococcal faecalis cellulitis, infected left hand hematoma s/p debridement 12/13, citrobacter and enterobacter cloacae tracheobronchitis, ecoli UTI. Trach changed and downsized by surgery on 1/30  to #4 fenestrated cuffed trach    DX: cardiac arrest, respiratory arrest / acute hypoxic respiratory failure requiring intubation, failure to wean s/p trach and PEG, new onset tonic clonic seizure, hypoglycemia with underlying DM, ELMER on CKD,with ATN, shock liver (resolved), takosubo cardiomyopathy, left arm DVT, L hand hematoma and cellulitis, tracheobronchitis, UTI      - tolerating size 4 fenestrated cuffed trach (changed/downsized 1/30)   - has been tolerating passy cordelia valve  - still with some secretions but pt expectorating  - on scopolamine patch for secretions  - trach capped today and doing well  - has been off mechanical vent support since 1/23  - maintaining O2 sat > 90%  - will keep capped during the day and uncap at bedtime with trach collar   - recap in AM  - discussed with respiratory therapist  - passed swallow eval, ?attempt trial of po intake with PEG feeds to supplement   - son at bedside, updated

## 2023-02-23 NOTE — PROGRESS NOTE ADULT - SUBJECTIVE AND OBJECTIVE BOX
*Covering for Dr. Crawford pulmonary     24 hr events:  capping trial today and tolerating  o2 sat 97% with trach capped  coughing and expectorating secretions via mouth  tracheal suctioning performed with clear secretion suctioned prior to capping trial      ## ROS:  [x ] limited due to trach status and ability to speak clearly   denies SOB  + cough  no CP  no abdominal pain      ## Labs:  CBC:                        11.2   11.00 )-----------( 230      ( 23 Feb 2023 08:20 )             35.3     Chem:  02-23    137  |  100  |  29<H>  ----------------------------<  144<H>  4.2   |  31  |  0.77    Ca    8.5      23 Feb 2023 08:20  Mg     2.2     02-23    TPro  5.8<L>  /  Alb  2.1<L>  /  TBili  0.2  /  DBili  x   /  AST  12<L>  /  ALT  15  /  AlkPhos  82  02-23      ## Medications:  metoprolol tartrate 25 milliGRAM(s) Enteral Tube two times a day    albuterol/ipratropium for Nebulization 3 milliLiter(s) Nebulizer every 6 hours PRN  budesonide 160 MICROgram(s)/formoterol 4.5 MICROgram(s) Inhaler 2 Puff(s) Inhalation two times a day  diphenhydrAMINE Injectable 50 milliGRAM(s) IV Push once    atorvastatin 20 milliGRAM(s) Oral at bedtime  dextrose 50% Injectable 25 Gram(s) IV Push once  dextrose 50% Injectable 12.5 Gram(s) IV Push once  dextrose 50% Injectable 25 Gram(s) IV Push once  dextrose Oral Gel 15 Gram(s) Oral once PRN  glucagon  Injectable 1 milliGRAM(s) IntraMuscular once  insulin glargine Injectable (LANTUS) 10 Unit(s) SubCutaneous at bedtime  insulin lispro (ADMELOG) corrective regimen sliding scale   SubCutaneous every 6 hours    apixaban 5 milliGRAM(s) Oral every 12 hours  aspirin  chewable 81 milliGRAM(s) Oral daily    aluminum hydroxide/magnesium hydroxide/simethicone Suspension 30 milliLiter(s) Oral every 4 hours PRN  glycopyrrolate 1 milliGRAM(s) Oral two times a day  polyethylene glycol 3350 17 Gram(s) Oral daily  senna 2 Tablet(s) Oral at bedtime    acetaminophen     Tablet .. 650 milliGRAM(s) Oral every 6 hours PRN  levETIRAcetam  Solution 1500 milliGRAM(s) Oral two times a day  scopolamine 1 mG/72 Hr(s) Patch 1 Patch Transdermal every 72 hours  valproic  acid Syrup 1000 milliGRAM(s) Oral <User Schedule>  valproic  acid Syrup 750 milliGRAM(s) Oral <User Schedule>      ## Vitals:  T(C): 36.3 (02-23-23 @ 16:18), Max: 36.8 (02-22-23 @ 23:50)  HR: 75 (02-23-23 @ 17:56) (66 - 78)  BP: 108/67 (02-23-23 @ 16:18) (108/67 - 132/60)  RR: 18 (02-23-23 @ 17:00) (18 - 20)  SpO2: 95% (02-23-23 @ 17:56) (95% - 99%)        02-22 @ 07:01  -  02-23 @ 07:00  --------------------------------------------------------  IN: 930 mL / OUT: 0 mL / NET: 930 mL          ## P/E:  Gen: lying comfortably in bed in no apparent distress  HEENT: EOMI, trach in place  Resp: some scattered rhonchi bilaterally, no wheeze, no rales  CVS: RRR  Abd: soft NT/ND +BS + PEG  Ext: no c/c/e  Neuro: awake, alert, follows commands, attempting to communicate by speaking but speech difficult to understand and voice still hypophonic        CODE STATUS: [x ] full code  [ ] DNR  [ ] DNI  [ ] [facility]  Goals of care discussion: [x] yes

## 2023-02-24 LAB
ANION GAP SERPL CALC-SCNC: 4 MMOL/L — LOW (ref 5–17)
BASOPHILS # BLD AUTO: 0.07 K/UL — SIGNIFICANT CHANGE UP (ref 0–0.2)
BASOPHILS NFR BLD AUTO: 0.9 % — SIGNIFICANT CHANGE UP (ref 0–2)
BUN SERPL-MCNC: 26 MG/DL — HIGH (ref 7–23)
CALCIUM SERPL-MCNC: 9.2 MG/DL — SIGNIFICANT CHANGE UP (ref 8.5–10.1)
CHLORIDE SERPL-SCNC: 104 MMOL/L — SIGNIFICANT CHANGE UP (ref 96–108)
CO2 SERPL-SCNC: 32 MMOL/L — HIGH (ref 22–31)
CREAT SERPL-MCNC: 0.74 MG/DL — SIGNIFICANT CHANGE UP (ref 0.5–1.3)
EGFR: 95 ML/MIN/1.73M2 — SIGNIFICANT CHANGE UP
EOSINOPHIL # BLD AUTO: 0.76 K/UL — HIGH (ref 0–0.5)
EOSINOPHIL NFR BLD AUTO: 9.7 % — HIGH (ref 0–6)
GLUCOSE BLDC GLUCOMTR-MCNC: 168 MG/DL — HIGH (ref 70–99)
GLUCOSE BLDC GLUCOMTR-MCNC: 169 MG/DL — HIGH (ref 70–99)
GLUCOSE BLDC GLUCOMTR-MCNC: 173 MG/DL — HIGH (ref 70–99)
GLUCOSE BLDC GLUCOMTR-MCNC: 186 MG/DL — HIGH (ref 70–99)
GLUCOSE BLDC GLUCOMTR-MCNC: 99 MG/DL — SIGNIFICANT CHANGE UP (ref 70–99)
GLUCOSE SERPL-MCNC: 181 MG/DL — HIGH (ref 70–99)
HCT VFR BLD CALC: 34.6 % — LOW (ref 39–50)
HGB BLD-MCNC: 11.1 G/DL — LOW (ref 13–17)
IMM GRANULOCYTES NFR BLD AUTO: 0.3 % — SIGNIFICANT CHANGE UP (ref 0–0.9)
LYMPHOCYTES # BLD AUTO: 2.44 K/UL — SIGNIFICANT CHANGE UP (ref 1–3.3)
LYMPHOCYTES # BLD AUTO: 31.2 % — SIGNIFICANT CHANGE UP (ref 13–44)
MAGNESIUM SERPL-MCNC: 2.3 MG/DL — SIGNIFICANT CHANGE UP (ref 1.6–2.6)
MCHC RBC-ENTMCNC: 30.1 PG — SIGNIFICANT CHANGE UP (ref 27–34)
MCHC RBC-ENTMCNC: 32.1 G/DL — SIGNIFICANT CHANGE UP (ref 32–36)
MCV RBC AUTO: 93.8 FL — SIGNIFICANT CHANGE UP (ref 80–100)
MONOCYTES # BLD AUTO: 0.76 K/UL — SIGNIFICANT CHANGE UP (ref 0–0.9)
MONOCYTES NFR BLD AUTO: 9.7 % — SIGNIFICANT CHANGE UP (ref 2–14)
NEUTROPHILS # BLD AUTO: 3.77 K/UL — SIGNIFICANT CHANGE UP (ref 1.8–7.4)
NEUTROPHILS NFR BLD AUTO: 48.2 % — SIGNIFICANT CHANGE UP (ref 43–77)
NRBC # BLD: 0 /100 WBCS — SIGNIFICANT CHANGE UP (ref 0–0)
PHOSPHATE SERPL-MCNC: 3.5 MG/DL — SIGNIFICANT CHANGE UP (ref 2.5–4.5)
PLATELET # BLD AUTO: 201 K/UL — SIGNIFICANT CHANGE UP (ref 150–400)
POTASSIUM SERPL-MCNC: 4.1 MMOL/L — SIGNIFICANT CHANGE UP (ref 3.5–5.3)
POTASSIUM SERPL-SCNC: 4.1 MMOL/L — SIGNIFICANT CHANGE UP (ref 3.5–5.3)
RBC # BLD: 3.69 M/UL — LOW (ref 4.2–5.8)
RBC # FLD: 14.5 % — SIGNIFICANT CHANGE UP (ref 10.3–14.5)
SODIUM SERPL-SCNC: 140 MMOL/L — SIGNIFICANT CHANGE UP (ref 135–145)
WBC # BLD: 7.82 K/UL — SIGNIFICANT CHANGE UP (ref 3.8–10.5)
WBC # FLD AUTO: 7.82 K/UL — SIGNIFICANT CHANGE UP (ref 3.8–10.5)

## 2023-02-24 PROCEDURE — 99232 SBSQ HOSP IP/OBS MODERATE 35: CPT

## 2023-02-24 PROCEDURE — 99233 SBSQ HOSP IP/OBS HIGH 50: CPT

## 2023-02-24 RX ADMIN — CHLORHEXIDINE GLUCONATE 1 APPLICATION(S): 213 SOLUTION TOPICAL at 05:05

## 2023-02-24 RX ADMIN — Medication 81 MILLIGRAM(S): at 14:31

## 2023-02-24 RX ADMIN — ATORVASTATIN CALCIUM 20 MILLIGRAM(S): 80 TABLET, FILM COATED ORAL at 22:19

## 2023-02-24 RX ADMIN — Medication 1 APPLICATION(S): at 18:15

## 2023-02-24 RX ADMIN — Medication 1 APPLICATION(S): at 05:06

## 2023-02-24 RX ADMIN — Medication 1 APPLICATION(S): at 18:06

## 2023-02-24 RX ADMIN — BUDESONIDE AND FORMOTEROL FUMARATE DIHYDRATE 2 PUFF(S): 160; 4.5 AEROSOL RESPIRATORY (INHALATION) at 17:19

## 2023-02-24 RX ADMIN — Medication 1 APPLICATION(S): at 05:10

## 2023-02-24 RX ADMIN — SENNA PLUS 2 TABLET(S): 8.6 TABLET ORAL at 22:19

## 2023-02-24 RX ADMIN — Medication 1: at 14:32

## 2023-02-24 RX ADMIN — POLYETHYLENE GLYCOL 3350 17 GRAM(S): 17 POWDER, FOR SOLUTION ORAL at 14:30

## 2023-02-24 RX ADMIN — ROBINUL 1 MILLIGRAM(S): 0.2 INJECTION INTRAMUSCULAR; INTRAVENOUS at 18:08

## 2023-02-24 RX ADMIN — APIXABAN 5 MILLIGRAM(S): 2.5 TABLET, FILM COATED ORAL at 18:07

## 2023-02-24 RX ADMIN — BUDESONIDE AND FORMOTEROL FUMARATE DIHYDRATE 2 PUFF(S): 160; 4.5 AEROSOL RESPIRATORY (INHALATION) at 05:07

## 2023-02-24 RX ADMIN — Medication 1000 MILLIGRAM(S): at 22:38

## 2023-02-24 RX ADMIN — Medication 1 APPLICATION(S): at 05:07

## 2023-02-24 RX ADMIN — Medication 25 MILLIGRAM(S): at 18:07

## 2023-02-24 RX ADMIN — LEVETIRACETAM 1500 MILLIGRAM(S): 250 TABLET, FILM COATED ORAL at 05:05

## 2023-02-24 RX ADMIN — ROBINUL 1 MILLIGRAM(S): 0.2 INJECTION INTRAMUSCULAR; INTRAVENOUS at 05:06

## 2023-02-24 RX ADMIN — INSULIN GLARGINE 10 UNIT(S): 100 INJECTION, SOLUTION SUBCUTANEOUS at 22:19

## 2023-02-24 RX ADMIN — APIXABAN 5 MILLIGRAM(S): 2.5 TABLET, FILM COATED ORAL at 05:06

## 2023-02-24 RX ADMIN — Medication 1: at 00:29

## 2023-02-24 RX ADMIN — Medication 1 MILLIGRAM(S): at 14:31

## 2023-02-24 RX ADMIN — Medication 1: at 06:16

## 2023-02-24 RX ADMIN — PREGABALIN 1000 MICROGRAM(S): 225 CAPSULE ORAL at 14:39

## 2023-02-24 RX ADMIN — Medication 1: at 17:35

## 2023-02-24 RX ADMIN — LEVETIRACETAM 1500 MILLIGRAM(S): 250 TABLET, FILM COATED ORAL at 18:07

## 2023-02-24 RX ADMIN — Medication 750 MILLIGRAM(S): at 10:50

## 2023-02-24 RX ADMIN — Medication 25 MILLIGRAM(S): at 05:09

## 2023-02-24 NOTE — PROGRESS NOTE ADULT - SUBJECTIVE AND OBJECTIVE BOX
CC: Patient is a 72y old  Male who presents with a chief complaint of s/p cardiac arrest, hypoglycemia (23 Feb 2023 19:34)      Patient seen and examined at bedside, No acute overnight events. Patient son at bedside    ROS: Denies fever, nausea, vomiting, chest pain, SOB, abdominal pain, diarrhea and constipation    Vital Sign  Vital Signs Last 24 Hrs  T(C): 36.6 (24 Feb 2023 04:59), Max: 36.7 (24 Feb 2023 00:04)  T(F): 97.8 (24 Feb 2023 04:59), Max: 98.1 (24 Feb 2023 00:04)  HR: 64 (24 Feb 2023 09:45) (63 - 78)  BP: 114/68 (24 Feb 2023 04:59) (108/67 - 125/73)  BP(mean): --  RR: 18 (24 Feb 2023 09:45) (18 - 18)  SpO2: 99% (24 Feb 2023 09:45) (95% - 100%)    Parameters below as of 24 Feb 2023 09:45  Patient On (Oxygen Delivery Method): tracheostomy collar  O2 Flow (L/min): 8  O2 Concentration (%): 28    Physical Exam:   Gen: NAD  HEENT: NCAT, EOMI, PERRLA, Pupils_  CVS: RRR, +S1/S2, no murmurs, rubs or gallops appreciated  Lungs: CTAB, no wheeze, rales, rhonchi  Abdomen: +BS, soft, ND, NT. no palpable flank tenderness or mass, no CVA tenderness  Ext: No cyanosis, edema or calf tenderness  Neuro: AAOx3, no focal deficits.    Labs:                        11.1   7.82  )-----------( 201      ( 24 Feb 2023 07:00 )             34.6   02-24    140  |  104  |  26<H>  ----------------------------<  181<H>  4.1   |  32<H>  |  0.74    Ca    9.2      24 Feb 2023 07:00  Phos  3.5     02-24  Mg     2.3     02-24    TPro  5.8<L>  /  Alb  2.1<L>  /  TBili  0.2  /  DBili  x   /  AST  12<L>  /  ALT  15  /  AlkPhos  82  02-23        Radiology:  *Pull    Medications:  MEDICATIONS  (STANDING):  apixaban 5 milliGRAM(s) Oral every 12 hours  aspirin  chewable 81 milliGRAM(s) Oral daily  atorvastatin 20 milliGRAM(s) Oral at bedtime  bacitracin   Ointment 1 Application(s) Topical two times a day  bacitracin   Ointment 1 Application(s) Topical two times a day  budesonide 160 MICROgram(s)/formoterol 4.5 MICROgram(s) Inhaler 2 Puff(s) Inhalation two times a day  chlorhexidine 2% Cloths 1 Application(s) Topical <User Schedule>  cyanocobalamin 1000 MICROGram(s) Oral daily  dextrose 5%. 1000 milliLiter(s) (100 mL/Hr) IV Continuous <Continuous>  dextrose 5%. 1000 milliLiter(s) (50 mL/Hr) IV Continuous <Continuous>  dextrose 50% Injectable 25 Gram(s) IV Push once  dextrose 50% Injectable 12.5 Gram(s) IV Push once  dextrose 50% Injectable 25 Gram(s) IV Push once  diphenhydrAMINE Injectable 50 milliGRAM(s) IV Push once  folic acid 1 milliGRAM(s) Oral daily  glucagon  Injectable 1 milliGRAM(s) IntraMuscular once  glycopyrrolate 1 milliGRAM(s) Oral two times a day  insulin glargine Injectable (LANTUS) 10 Unit(s) SubCutaneous at bedtime  insulin lispro (ADMELOG) corrective regimen sliding scale   SubCutaneous every 6 hours  levETIRAcetam  Solution 1500 milliGRAM(s) Oral two times a day  metoprolol tartrate 25 milliGRAM(s) Enteral Tube two times a day  polyethylene glycol 3350 17 Gram(s) Oral daily  scopolamine 1 mG/72 Hr(s) Patch 1 Patch Transdermal every 72 hours  senna 2 Tablet(s) Oral at bedtime  silver sulfADIAZINE 1% Cream 1 Application(s) Topical two times a day  valproic  acid Syrup 1000 milliGRAM(s) Oral <User Schedule>  valproic  acid Syrup 750 milliGRAM(s) Oral <User Schedule>  vitamin A &amp; D Ointment 1 Application(s) Topical two times a day    MEDICATIONS  (PRN):  acetaminophen     Tablet .. 650 milliGRAM(s) Oral every 6 hours PRN Temp greater or equal to 38C (100.4F), Mild Pain (1 - 3)  albuterol/ipratropium for Nebulization 3 milliLiter(s) Nebulizer every 6 hours PRN Shortness of Breath and/or Wheezing  aluminum hydroxide/magnesium hydroxide/simethicone Suspension 30 milliLiter(s) Oral every 4 hours PRN Dyspepsia  dextrose Oral Gel 15 Gram(s) Oral once PRN Blood Glucose LESS THAN 70 milliGRAM(s)/deciliter

## 2023-02-24 NOTE — PROGRESS NOTE ADULT - ASSESSMENT
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  Post arrest noted to have generalized tonic-clonic seizures confirmed on EEG. Started on antiepileptics. Hospital course complicated by failure to wean s/p trach and peg 12/5/22, left upper extremity DVT initiated on eliquis, fevers with multiple infections due to enterococcal faecalis cellulitis, infected left hand hematoma s/p debridement 12/13, citrobacter and enterobacter cloacae tracheobronchitis, ecoli UTI. Trach changed and downsized by surgery on 1/30  to #4 fenestrated cuffed trach Capping trials initiated 2/23    DX: cardiac arrest, respiratory arrest / acute hypoxic respiratory failure requiring intubation, failure to wean s/p trach and PEG, new onset tonic clonic seizure, hypoglycemia with underlying DM, ELMER on CKD,with ATN, shock liver (resolved), takosubo cardiomyopathy, left arm DVT, L hand hematoma and cellulitis, tracheobronchitis, UTI      - tolerating size 4 fenestrated cuffed trach (changed/downsized 1/30)   - has been tolerating passy cordelia valve  - still with some secretions but pt expectorating  - on scopolamine patch for secretions  - trach capped yesterday during the day with TC at night, did well with capping  - capped again during the day today  - will continue daily capping trials during the day as tolerates and uncap at bedtime with trach collar   - if pt continues to do well over the weekend will consider decannulation next week  - has been off mechanical vent support since 1/23  - maintaining O2 sat > 90%  - discussed with respiratory therapist  - passed swallow eval, ?attempt trial of po intake with PEG feeds to supplement, currently only on PEG feeds

## 2023-02-24 NOTE — PROGRESS NOTE ADULT - SUBJECTIVE AND OBJECTIVE BOX
*Covering for Dr. Crawford pulmonary    24 hr events:  pt seen earlier this afternoon  no acute events overnight  tolerated capping trial yesterday  placed on trach collar overnight  recapped during daytime today, saturating well    ## ROS:  [x ] limited due to trach status  denies SOB  + cough with secretions  no chest pain      ## Labs:  CBC:                        11.1   7.82  )-----------( 201      ( 24 Feb 2023 07:00 )             34.6     Chem:  02-24    140  |  104  |  26<H>  ----------------------------<  181<H>  4.1   |  32<H>  |  0.74    Ca    9.2      24 Feb 2023 07:00  Phos  3.5     02-24  Mg     2.3     02-24    TPro  5.8<L>  /  Alb  2.1<L>  /  TBili  0.2  /  DBili  x   /  AST  12<L>  /  ALT  15  /  AlkPhos  82  02-23      ## Medications:  metoprolol tartrate 25 milliGRAM(s) Enteral Tube two times a day    albuterol/ipratropium for Nebulization 3 milliLiter(s) Nebulizer every 6 hours PRN  budesonide 160 MICROgram(s)/formoterol 4.5 MICROgram(s) Inhaler 2 Puff(s) Inhalation two times a day  diphenhydrAMINE Injectable 50 milliGRAM(s) IV Push once    atorvastatin 20 milliGRAM(s) Oral at bedtime  dextrose 50% Injectable 25 Gram(s) IV Push once  dextrose 50% Injectable 12.5 Gram(s) IV Push once  dextrose 50% Injectable 25 Gram(s) IV Push once  dextrose Oral Gel 15 Gram(s) Oral once PRN  glucagon  Injectable 1 milliGRAM(s) IntraMuscular once  insulin glargine Injectable (LANTUS) 10 Unit(s) SubCutaneous at bedtime  insulin lispro (ADMELOG) corrective regimen sliding scale   SubCutaneous every 6 hours    apixaban 5 milliGRAM(s) Oral every 12 hours  aspirin  chewable 81 milliGRAM(s) Oral daily    aluminum hydroxide/magnesium hydroxide/simethicone Suspension 30 milliLiter(s) Oral every 4 hours PRN  glycopyrrolate 1 milliGRAM(s) Oral two times a day  polyethylene glycol 3350 17 Gram(s) Oral daily  senna 2 Tablet(s) Oral at bedtime    acetaminophen     Tablet .. 650 milliGRAM(s) Oral every 6 hours PRN  levETIRAcetam  Solution 1500 milliGRAM(s) Oral two times a day  scopolamine 1 mG/72 Hr(s) Patch 1 Patch Transdermal every 72 hours  valproic  acid Syrup 1000 milliGRAM(s) Oral <User Schedule>  valproic  acid Syrup 750 milliGRAM(s) Oral <User Schedule>      ## Vitals:  T(C): 37.1 (02-24-23 @ 16:05), Max: 37.1 (02-24-23 @ 16:05)  HR: 68 (02-24-23 @ 21:14) (61 - 78)  BP: 114/67 (02-24-23 @ 18:54) (113/73 - 125/73)  RR: 18 (02-24-23 @ 21:14) (17 - 20)  SpO2: 99% (02-24-23 @ 21:14) (96% - 100%)        ## P/E:  Gen: lying comfortably in bed in no apparent distress  HEENT: EOMI, sclera white, trach in place  Resp: CTA B/L , no wheeze, no rhonchi  CVS: RRR  Abd: soft NT/ND +BS + PEG  Ext: no c/c/e  Neuro: awake, alert, following commands, speech difficult to understand       CODE STATUS: [x ] full code  [ ] DNR  [ ] DNI  [ ] MOLST  Goals of care discussion: [x ] yes

## 2023-02-25 LAB
GLUCOSE BLDC GLUCOMTR-MCNC: 141 MG/DL — HIGH (ref 70–99)
GLUCOSE BLDC GLUCOMTR-MCNC: 155 MG/DL — HIGH (ref 70–99)
GLUCOSE BLDC GLUCOMTR-MCNC: 168 MG/DL — HIGH (ref 70–99)
GLUCOSE BLDC GLUCOMTR-MCNC: 179 MG/DL — HIGH (ref 70–99)

## 2023-02-25 PROCEDURE — 99233 SBSQ HOSP IP/OBS HIGH 50: CPT

## 2023-02-25 RX ADMIN — SENNA PLUS 2 TABLET(S): 8.6 TABLET ORAL at 22:28

## 2023-02-25 RX ADMIN — Medication 1 APPLICATION(S): at 17:22

## 2023-02-25 RX ADMIN — Medication 1000 MILLIGRAM(S): at 20:28

## 2023-02-25 RX ADMIN — SCOPALAMINE 1 PATCH: 1 PATCH, EXTENDED RELEASE TRANSDERMAL at 19:55

## 2023-02-25 RX ADMIN — Medication 1: at 17:32

## 2023-02-25 RX ADMIN — ROBINUL 1 MILLIGRAM(S): 0.2 INJECTION INTRAMUSCULAR; INTRAVENOUS at 17:32

## 2023-02-25 RX ADMIN — LEVETIRACETAM 1500 MILLIGRAM(S): 250 TABLET, FILM COATED ORAL at 17:31

## 2023-02-25 RX ADMIN — SCOPALAMINE 1 PATCH: 1 PATCH, EXTENDED RELEASE TRANSDERMAL at 08:32

## 2023-02-25 RX ADMIN — Medication 1: at 12:20

## 2023-02-25 RX ADMIN — APIXABAN 5 MILLIGRAM(S): 2.5 TABLET, FILM COATED ORAL at 17:31

## 2023-02-25 RX ADMIN — Medication 25 MILLIGRAM(S): at 17:31

## 2023-02-25 RX ADMIN — Medication 1: at 09:09

## 2023-02-25 RX ADMIN — Medication 1 MILLIGRAM(S): at 12:44

## 2023-02-25 RX ADMIN — PREGABALIN 1000 MICROGRAM(S): 225 CAPSULE ORAL at 12:44

## 2023-02-25 RX ADMIN — Medication 81 MILLIGRAM(S): at 12:44

## 2023-02-25 RX ADMIN — Medication 1 APPLICATION(S): at 17:23

## 2023-02-25 RX ADMIN — INSULIN GLARGINE 10 UNIT(S): 100 INJECTION, SOLUTION SUBCUTANEOUS at 22:28

## 2023-02-25 RX ADMIN — Medication 750 MILLIGRAM(S): at 09:09

## 2023-02-25 RX ADMIN — BUDESONIDE AND FORMOTEROL FUMARATE DIHYDRATE 2 PUFF(S): 160; 4.5 AEROSOL RESPIRATORY (INHALATION) at 17:34

## 2023-02-25 RX ADMIN — POLYETHYLENE GLYCOL 3350 17 GRAM(S): 17 POWDER, FOR SOLUTION ORAL at 13:40

## 2023-02-25 RX ADMIN — ATORVASTATIN CALCIUM 20 MILLIGRAM(S): 80 TABLET, FILM COATED ORAL at 22:48

## 2023-02-25 NOTE — PROGRESS NOTE ADULT - SUBJECTIVE AND OBJECTIVE BOX
CC: Patient is a 72y old  Male who presents with a chief complaint of s/p cardiac arrest, hypoglycemia (24 Feb 2023 23:07)      Patient seen and examined at bedside, No acute overnight events. Patient is able to nod  his heads, denies any acute complaints.   No complaints as per nursing    ROS: Denies fever, nausea, vomiting, chest pain, SOB, abdominal pain, diarrhea and constipation    Vital Sign  Vital Signs Last 24 Hrs  T(C): 36.8 (25 Feb 2023 10:35), Max: 37.1 (24 Feb 2023 16:05)  T(F): 98.2 (25 Feb 2023 10:35), Max: 98.8 (24 Feb 2023 16:05)  HR: 65 (25 Feb 2023 10:35) (64 - 82)  BP: 117/71 (25 Feb 2023 10:35) (113/73 - 134/74)  BP(mean): 74 (24 Feb 2023 23:45) (74 - 74)  RR: 18 (25 Feb 2023 10:35) (18 - 20)  SpO2: 100% (25 Feb 2023 10:35) (96% - 100%)    Parameters below as of 25 Feb 2023 07:00  Patient On (Oxygen Delivery Method): tracheostomy collar        Physical Exam:   Gen: NAD  HEENT: NCAT, EOMI, PERRLA, Pupils, trach in place  CVS: RRR, +S1/S2, no murmurs, rubs or gallops appreciated  Lungs: CTAB, no wheeze, rales, rhonchi  Abdomen: +BS, soft, ND, NT. no palpable flank tenderness or mass, no CVA tenderness- Peg tub in place  Ext: No cyanosis, edema or calf tenderness  Neuro: AAOx3, no focal deficits.    Labs:                        11.1   7.82  )-----------( 201      ( 24 Feb 2023 07:00 )             34.6   02-24    140  |  104  |  26<H>  ----------------------------<  181<H>  4.1   |  32<H>  |  0.74    Ca    9.2      24 Feb 2023 07:00  Phos  3.5     02-24  Mg     2.3     02-24        02-24 @ 07:01  - 02-25 @ 07:00  --------------------------------------------------------  IN: 0 mL / OUT: 600 mL / NET: -600 mL        Radiology:  *Pull    Medications:  MEDICATIONS  (STANDING):  apixaban 5 milliGRAM(s) Oral every 12 hours  aspirin  chewable 81 milliGRAM(s) Oral daily  atorvastatin 20 milliGRAM(s) Oral at bedtime  bacitracin   Ointment 1 Application(s) Topical two times a day  bacitracin   Ointment 1 Application(s) Topical two times a day  budesonide 160 MICROgram(s)/formoterol 4.5 MICROgram(s) Inhaler 2 Puff(s) Inhalation two times a day  chlorhexidine 2% Cloths 1 Application(s) Topical <User Schedule>  cyanocobalamin 1000 MICROGram(s) Oral daily  dextrose 5%. 1000 milliLiter(s) (50 mL/Hr) IV Continuous <Continuous>  dextrose 5%. 1000 milliLiter(s) (100 mL/Hr) IV Continuous <Continuous>  dextrose 50% Injectable 25 Gram(s) IV Push once  dextrose 50% Injectable 12.5 Gram(s) IV Push once  dextrose 50% Injectable 25 Gram(s) IV Push once  diphenhydrAMINE Injectable 50 milliGRAM(s) IV Push once  folic acid 1 milliGRAM(s) Oral daily  glucagon  Injectable 1 milliGRAM(s) IntraMuscular once  glycopyrrolate 1 milliGRAM(s) Oral two times a day  insulin glargine Injectable (LANTUS) 10 Unit(s) SubCutaneous at bedtime  insulin lispro (ADMELOG) corrective regimen sliding scale   SubCutaneous every 6 hours  levETIRAcetam  Solution 1500 milliGRAM(s) Oral two times a day  metoprolol tartrate 25 milliGRAM(s) Enteral Tube two times a day  polyethylene glycol 3350 17 Gram(s) Oral daily  scopolamine 1 mG/72 Hr(s) Patch 1 Patch Transdermal every 72 hours  senna 2 Tablet(s) Oral at bedtime  silver sulfADIAZINE 1% Cream 1 Application(s) Topical two times a day  valproic  acid Syrup 1000 milliGRAM(s) Oral <User Schedule>  valproic  acid Syrup 750 milliGRAM(s) Oral <User Schedule>  vitamin A &amp; D Ointment 1 Application(s) Topical two times a day    MEDICATIONS  (PRN):  acetaminophen     Tablet .. 650 milliGRAM(s) Oral every 6 hours PRN Temp greater or equal to 38C (100.4F), Mild Pain (1 - 3)  albuterol/ipratropium for Nebulization 3 milliLiter(s) Nebulizer every 6 hours PRN Shortness of Breath and/or Wheezing  aluminum hydroxide/magnesium hydroxide/simethicone Suspension 30 milliLiter(s) Oral every 4 hours PRN Dyspepsia  dextrose Oral Gel 15 Gram(s) Oral once PRN Blood Glucose LESS THAN 70 milliGRAM(s)/deciliter

## 2023-02-26 LAB
GLUCOSE BLDC GLUCOMTR-MCNC: 125 MG/DL — HIGH (ref 70–99)
GLUCOSE BLDC GLUCOMTR-MCNC: 132 MG/DL — HIGH (ref 70–99)
GLUCOSE BLDC GLUCOMTR-MCNC: 147 MG/DL — HIGH (ref 70–99)
GLUCOSE BLDC GLUCOMTR-MCNC: 170 MG/DL — HIGH (ref 70–99)
GLUCOSE BLDC GLUCOMTR-MCNC: 195 MG/DL — HIGH (ref 70–99)

## 2023-02-26 PROCEDURE — 99233 SBSQ HOSP IP/OBS HIGH 50: CPT

## 2023-02-26 RX ADMIN — Medication 1 APPLICATION(S): at 05:32

## 2023-02-26 RX ADMIN — ROBINUL 1 MILLIGRAM(S): 0.2 INJECTION INTRAMUSCULAR; INTRAVENOUS at 17:55

## 2023-02-26 RX ADMIN — SCOPALAMINE 1 PATCH: 1 PATCH, EXTENDED RELEASE TRANSDERMAL at 23:41

## 2023-02-26 RX ADMIN — PREGABALIN 1000 MICROGRAM(S): 225 CAPSULE ORAL at 12:20

## 2023-02-26 RX ADMIN — Medication 1: at 00:06

## 2023-02-26 RX ADMIN — ATORVASTATIN CALCIUM 20 MILLIGRAM(S): 80 TABLET, FILM COATED ORAL at 21:42

## 2023-02-26 RX ADMIN — Medication 1 APPLICATION(S): at 05:34

## 2023-02-26 RX ADMIN — CHLORHEXIDINE GLUCONATE 1 APPLICATION(S): 213 SOLUTION TOPICAL at 05:34

## 2023-02-26 RX ADMIN — LEVETIRACETAM 1500 MILLIGRAM(S): 250 TABLET, FILM COATED ORAL at 05:33

## 2023-02-26 RX ADMIN — SCOPALAMINE 1 PATCH: 1 PATCH, EXTENDED RELEASE TRANSDERMAL at 08:25

## 2023-02-26 RX ADMIN — Medication 81 MILLIGRAM(S): at 12:21

## 2023-02-26 RX ADMIN — Medication 1 APPLICATION(S): at 17:15

## 2023-02-26 RX ADMIN — Medication 25 MILLIGRAM(S): at 05:32

## 2023-02-26 RX ADMIN — INSULIN GLARGINE 10 UNIT(S): 100 INJECTION, SOLUTION SUBCUTANEOUS at 21:42

## 2023-02-26 RX ADMIN — ROBINUL 1 MILLIGRAM(S): 0.2 INJECTION INTRAMUSCULAR; INTRAVENOUS at 05:32

## 2023-02-26 RX ADMIN — APIXABAN 5 MILLIGRAM(S): 2.5 TABLET, FILM COATED ORAL at 17:55

## 2023-02-26 RX ADMIN — Medication 1: at 17:56

## 2023-02-26 RX ADMIN — APIXABAN 5 MILLIGRAM(S): 2.5 TABLET, FILM COATED ORAL at 05:33

## 2023-02-26 RX ADMIN — BUDESONIDE AND FORMOTEROL FUMARATE DIHYDRATE 2 PUFF(S): 160; 4.5 AEROSOL RESPIRATORY (INHALATION) at 17:58

## 2023-02-26 RX ADMIN — SCOPALAMINE 1 PATCH: 1 PATCH, EXTENDED RELEASE TRANSDERMAL at 23:38

## 2023-02-26 RX ADMIN — Medication 750 MILLIGRAM(S): at 08:27

## 2023-02-26 RX ADMIN — Medication 1000 MILLIGRAM(S): at 21:42

## 2023-02-26 RX ADMIN — LEVETIRACETAM 1500 MILLIGRAM(S): 250 TABLET, FILM COATED ORAL at 17:55

## 2023-02-26 RX ADMIN — BUDESONIDE AND FORMOTEROL FUMARATE DIHYDRATE 2 PUFF(S): 160; 4.5 AEROSOL RESPIRATORY (INHALATION) at 05:35

## 2023-02-26 RX ADMIN — SCOPALAMINE 1 PATCH: 1 PATCH, EXTENDED RELEASE TRANSDERMAL at 19:31

## 2023-02-26 RX ADMIN — Medication 1 MILLIGRAM(S): at 12:21

## 2023-02-26 NOTE — PROGRESS NOTE ADULT - ASSESSMENT
73 yo male w/ PMH of COPD, CAD sp PCI w/ stent, CABG 2014, HF w/ PeF, 2nd degree heart block, sp PPM, HTN, DM, CKD Stage 3, CVA- lacunar infarcts admitted to ICU originally for cardiac arrest. ACLS for PEA arrest and ROSC returned after 5 minutes. Cardiac arrest possibly secondary to severe hypoglycemia from eating less and not tracking blood sugar carefully. Hospital course complicated by 1) prolonged hypoxemic respiratory failure. Difficult extubation requiring s/p trach and peg 2) left upper extremity/left subclavian DVT and placed on Eliquis 3) multiple infections (Enterococcal faecalis cellulitis, infected left hand hematoma, tracheobronchitis secondary to citrobacter 4) tonic clonic seizure - on Keppra / Depakote - currently undergoing reevaluation by NEUROLOGY 5) fever of 102 on 1/19. INFECTIOUS DISEASE reconsulted and pan culture ordered.    # Acute/Chronic Respiratory failure w/ Hypoxia and Hypercapnia/ COPD, now trach-vent dependent.  - S/p intubation; failed extubation requiring s/p trach and peg   - PMV (Passy Reading Valve) placed on 02/15/2023.  - tolerating Trach collar, vent discontinued; continue weaning as per Pulmonary   - cont w/ Symbicort, Duonebs prn  - Surgery on board, s/p Trach change to #4 fenestrated tracheostomy placed 1/30. Replaced on 02/14/2023.  - Had VFSS/MBS eval on 02/16/2023: Recommended Puree with thin liquids. Ordered.  - Frequent suction.  -Capping trial per pulmonary this week.    - Management as per Pulmonary.    # Myoclonic Seizure   - EEG: Myoclonic seizure  - Neurology on board   - cont w/ Keppra and Depakote.  - Myoclonic episode to LLE and LUE noted by his son.  - Mg 2.3 and K 4.1.  - Depakote level 55 (normal ); Keppra level pending.  - D/w Dr Loo (02/18/2023): recommended increase only the evening dose of Depakote from 750 mg to 1000 mg. and continue 750 mg.      # S/p Cardiac Arrest, acute metabolic encephalopathy due to severe hypoglycemia.  - s/p ACLS w/ ROSC after 5 min   - 2nd degree heart block, s/p PPM in place.  - ECHO Takotsubo cardiomyopathy. EF 50-55%, LVH, mild pHTN    - s/p ICU course; Hemodynamically stable now  - cont w/ Metoprolol, ASA and atorvastatin.    # Multiple infectious Disease   - tracheo-bronchitis secondary to citrobacter?? - resolved.  - enterococcal faecalis cellulitis - resolved   - infected left hand hematoma - s/p bedside debridement 12/13 w/ hand surgery   - monitor off abx              # Occlusive Left subclavian thrombosis   - LUE venous duplex : occlusive thrombus in the left mid subclavian vein with partial slow flow detected in the lateral subclavian vein.  - LUE arterial Duplex neg   - cont w/ Eliquis     # DM2   - A1c 8.8%  - cont w/ Lantus   - cont w/ FS monitoring w/ insulin s/s coverage     # HTN, Cad s/p PCI w/ stent/ CABG and HPL.  - cont with ASA, Metoprolol and Atorvastatin     # Anemia of Chronic Disease  - h/h stable, will cont  to monitor   - Hb 10.9, stable.    # Stage 3-4 sacral decubiti wound  - s/p debridement 12/19  -cont w/ wound care as recommended     # Dysphagia/ Moderate Protein Calorie Malnutrition  - Peg tube in place   - continue with tube feeding at goal as tolerated.     # Constipation  - cont w/ senna, Miralax prn     DVT prophylaxis: Eliquis.    Disposition: Patient has no insurance and Family in process of getting guardianship. Patient will then need placement.  CM/SW aware.

## 2023-02-26 NOTE — PROGRESS NOTE ADULT - SUBJECTIVE AND OBJECTIVE BOX
CC: Patient is a 72y old  Male who presents with a chief complaint of s/p cardiac arrest, hypoglycemia (25 Feb 2023 12:38)      Patient seen and examined at bedside, No acute overnight events.    ROS: Denies fever, nausea, vomiting, chest pain, SOB, abdominal pain, diarrhea and constipation    Vital Sign  Vital Signs Last 24 Hrs  T(C): 37.3 (26 Feb 2023 10:33), Max: 37.3 (26 Feb 2023 10:33)  T(F): 99.2 (26 Feb 2023 10:33), Max: 99.2 (26 Feb 2023 10:33)  HR: 89 (26 Feb 2023 10:33) (66 - 94)  BP: 123/72 (26 Feb 2023 10:33) (106/69 - 146/73)  BP(mean): --  RR: 18 (26 Feb 2023 10:33) (18 - 18)  SpO2: 98% (26 Feb 2023 10:33) (95% - 100%)    Parameters below as of 26 Feb 2023 10:33  Patient On (Oxygen Delivery Method): tracheostomy collar        Physical Exam:   Gen: NAD  HEENT: NCAT, EOMI, PERRLA, Pupils_  CVS: RRR, +S1/S2, no murmurs, rubs or gallops appreciated  Lungs: CTAB, no wheeze, rales, rhonchi  Abdomen: +BS, soft, ND, NT. no palpable flank tenderness or mass, no CVA tenderness  Ext: No cyanosis, edema or calf tenderness  Neuro: AAOx3, no focal deficits.    Labs:          02-25 @ 07:01  -  02-26 @ 07:00  --------------------------------------------------------  IN: 0 mL / OUT: 350 mL / NET: -350 mL        Radiology:  *Pull    Medications:  MEDICATIONS  (STANDING):  apixaban 5 milliGRAM(s) Oral every 12 hours  aspirin  chewable 81 milliGRAM(s) Oral daily  atorvastatin 20 milliGRAM(s) Oral at bedtime  bacitracin   Ointment 1 Application(s) Topical two times a day  bacitracin   Ointment 1 Application(s) Topical two times a day  budesonide 160 MICROgram(s)/formoterol 4.5 MICROgram(s) Inhaler 2 Puff(s) Inhalation two times a day  chlorhexidine 2% Cloths 1 Application(s) Topical <User Schedule>  cyanocobalamin 1000 MICROGram(s) Oral daily  dextrose 5%. 1000 milliLiter(s) (100 mL/Hr) IV Continuous <Continuous>  dextrose 5%. 1000 milliLiter(s) (50 mL/Hr) IV Continuous <Continuous>  dextrose 50% Injectable 25 Gram(s) IV Push once  dextrose 50% Injectable 12.5 Gram(s) IV Push once  dextrose 50% Injectable 25 Gram(s) IV Push once  diphenhydrAMINE Injectable 50 milliGRAM(s) IV Push once  folic acid 1 milliGRAM(s) Oral daily  glucagon  Injectable 1 milliGRAM(s) IntraMuscular once  glycopyrrolate 1 milliGRAM(s) Oral two times a day  insulin glargine Injectable (LANTUS) 10 Unit(s) SubCutaneous at bedtime  insulin lispro (ADMELOG) corrective regimen sliding scale   SubCutaneous every 6 hours  levETIRAcetam  Solution 1500 milliGRAM(s) Oral two times a day  metoprolol tartrate 25 milliGRAM(s) Enteral Tube two times a day  polyethylene glycol 3350 17 Gram(s) Oral daily  scopolamine 1 mG/72 Hr(s) Patch 1 Patch Transdermal every 72 hours  senna 2 Tablet(s) Oral at bedtime  silver sulfADIAZINE 1% Cream 1 Application(s) Topical two times a day  valproic  acid Syrup 1000 milliGRAM(s) Oral <User Schedule>  valproic  acid Syrup 750 milliGRAM(s) Oral <User Schedule>  vitamin A &amp; D Ointment 1 Application(s) Topical two times a day    MEDICATIONS  (PRN):  acetaminophen     Tablet .. 650 milliGRAM(s) Oral every 6 hours PRN Temp greater or equal to 38C (100.4F), Mild Pain (1 - 3)  albuterol/ipratropium for Nebulization 3 milliLiter(s) Nebulizer every 6 hours PRN Shortness of Breath and/or Wheezing  aluminum hydroxide/magnesium hydroxide/simethicone Suspension 30 milliLiter(s) Oral every 4 hours PRN Dyspepsia  dextrose Oral Gel 15 Gram(s) Oral once PRN Blood Glucose LESS THAN 70 milliGRAM(s)/deciliter

## 2023-02-27 LAB
ANION GAP SERPL CALC-SCNC: 4 MMOL/L — LOW (ref 5–17)
BASOPHILS # BLD AUTO: 0.09 K/UL — SIGNIFICANT CHANGE UP (ref 0–0.2)
BASOPHILS NFR BLD AUTO: 1.1 % — SIGNIFICANT CHANGE UP (ref 0–2)
BUN SERPL-MCNC: 24 MG/DL — HIGH (ref 7–23)
CALCIUM SERPL-MCNC: 8.6 MG/DL — SIGNIFICANT CHANGE UP (ref 8.5–10.1)
CHLORIDE SERPL-SCNC: 100 MMOL/L — SIGNIFICANT CHANGE UP (ref 96–108)
CO2 SERPL-SCNC: 34 MMOL/L — HIGH (ref 22–31)
CREAT SERPL-MCNC: 0.78 MG/DL — SIGNIFICANT CHANGE UP (ref 0.5–1.3)
EGFR: 94 ML/MIN/1.73M2 — SIGNIFICANT CHANGE UP
EOSINOPHIL # BLD AUTO: 0.94 K/UL — HIGH (ref 0–0.5)
EOSINOPHIL NFR BLD AUTO: 11.8 % — HIGH (ref 0–6)
GLUCOSE BLDC GLUCOMTR-MCNC: 105 MG/DL — HIGH (ref 70–99)
GLUCOSE BLDC GLUCOMTR-MCNC: 131 MG/DL — HIGH (ref 70–99)
GLUCOSE BLDC GLUCOMTR-MCNC: 136 MG/DL — HIGH (ref 70–99)
GLUCOSE BLDC GLUCOMTR-MCNC: 151 MG/DL — HIGH (ref 70–99)
GLUCOSE BLDC GLUCOMTR-MCNC: 191 MG/DL — HIGH (ref 70–99)
GLUCOSE BLDC GLUCOMTR-MCNC: 214 MG/DL — HIGH (ref 70–99)
GLUCOSE SERPL-MCNC: 118 MG/DL — HIGH (ref 70–99)
HCT VFR BLD CALC: 34.4 % — LOW (ref 39–50)
HGB BLD-MCNC: 11.1 G/DL — LOW (ref 13–17)
IMM GRANULOCYTES NFR BLD AUTO: 0.4 % — SIGNIFICANT CHANGE UP (ref 0–0.9)
LYMPHOCYTES # BLD AUTO: 2.59 K/UL — SIGNIFICANT CHANGE UP (ref 1–3.3)
LYMPHOCYTES # BLD AUTO: 32.4 % — SIGNIFICANT CHANGE UP (ref 13–44)
MAGNESIUM SERPL-MCNC: 2.2 MG/DL — SIGNIFICANT CHANGE UP (ref 1.6–2.6)
MCHC RBC-ENTMCNC: 30.2 PG — SIGNIFICANT CHANGE UP (ref 27–34)
MCHC RBC-ENTMCNC: 32.3 G/DL — SIGNIFICANT CHANGE UP (ref 32–36)
MCV RBC AUTO: 93.7 FL — SIGNIFICANT CHANGE UP (ref 80–100)
MONOCYTES # BLD AUTO: 0.72 K/UL — SIGNIFICANT CHANGE UP (ref 0–0.9)
MONOCYTES NFR BLD AUTO: 9 % — SIGNIFICANT CHANGE UP (ref 2–14)
NEUTROPHILS # BLD AUTO: 3.62 K/UL — SIGNIFICANT CHANGE UP (ref 1.8–7.4)
NEUTROPHILS NFR BLD AUTO: 45.3 % — SIGNIFICANT CHANGE UP (ref 43–77)
NRBC # BLD: 0 /100 WBCS — SIGNIFICANT CHANGE UP (ref 0–0)
PHOSPHATE SERPL-MCNC: 3.4 MG/DL — SIGNIFICANT CHANGE UP (ref 2.5–4.5)
PLATELET # BLD AUTO: 215 K/UL — SIGNIFICANT CHANGE UP (ref 150–400)
POTASSIUM SERPL-MCNC: 3.9 MMOL/L — SIGNIFICANT CHANGE UP (ref 3.5–5.3)
POTASSIUM SERPL-SCNC: 3.9 MMOL/L — SIGNIFICANT CHANGE UP (ref 3.5–5.3)
RBC # BLD: 3.67 M/UL — LOW (ref 4.2–5.8)
RBC # FLD: 14.6 % — HIGH (ref 10.3–14.5)
SODIUM SERPL-SCNC: 138 MMOL/L — SIGNIFICANT CHANGE UP (ref 135–145)
WBC # BLD: 7.99 K/UL — SIGNIFICANT CHANGE UP (ref 3.8–10.5)
WBC # FLD AUTO: 7.99 K/UL — SIGNIFICANT CHANGE UP (ref 3.8–10.5)

## 2023-02-27 PROCEDURE — 99233 SBSQ HOSP IP/OBS HIGH 50: CPT

## 2023-02-27 PROCEDURE — 99232 SBSQ HOSP IP/OBS MODERATE 35: CPT

## 2023-02-27 RX ORDER — PANTOPRAZOLE SODIUM 20 MG/1
40 TABLET, DELAYED RELEASE ORAL
Refills: 0 | Status: DISCONTINUED | OUTPATIENT
Start: 2023-02-27 | End: 2023-02-27

## 2023-02-27 RX ORDER — PANTOPRAZOLE SODIUM 20 MG/1
40 TABLET, DELAYED RELEASE ORAL
Refills: 0 | Status: DISCONTINUED | OUTPATIENT
Start: 2023-02-27 | End: 2023-03-18

## 2023-02-27 RX ORDER — NITROGLYCERIN 6.5 MG
0.4 CAPSULE, EXTENDED RELEASE ORAL
Refills: 0 | Status: DISCONTINUED | OUTPATIENT
Start: 2023-02-27 | End: 2023-03-10

## 2023-02-27 RX ORDER — LANOLIN ALCOHOL/MO/W.PET/CERES
3 CREAM (GRAM) TOPICAL AT BEDTIME
Refills: 0 | Status: DISCONTINUED | OUTPATIENT
Start: 2023-02-27 | End: 2023-03-18

## 2023-02-27 RX ADMIN — Medication 1 APPLICATION(S): at 06:19

## 2023-02-27 RX ADMIN — Medication 1 APPLICATION(S): at 17:17

## 2023-02-27 RX ADMIN — ROBINUL 1 MILLIGRAM(S): 0.2 INJECTION INTRAMUSCULAR; INTRAVENOUS at 17:09

## 2023-02-27 RX ADMIN — ROBINUL 1 MILLIGRAM(S): 0.2 INJECTION INTRAMUSCULAR; INTRAVENOUS at 06:14

## 2023-02-27 RX ADMIN — Medication 1 APPLICATION(S): at 18:28

## 2023-02-27 RX ADMIN — Medication 25 MILLIGRAM(S): at 17:09

## 2023-02-27 RX ADMIN — Medication 1 APPLICATION(S): at 06:14

## 2023-02-27 RX ADMIN — BUDESONIDE AND FORMOTEROL FUMARATE DIHYDRATE 2 PUFF(S): 160; 4.5 AEROSOL RESPIRATORY (INHALATION) at 06:19

## 2023-02-27 RX ADMIN — INSULIN GLARGINE 10 UNIT(S): 100 INJECTION, SOLUTION SUBCUTANEOUS at 21:08

## 2023-02-27 RX ADMIN — LEVETIRACETAM 1500 MILLIGRAM(S): 250 TABLET, FILM COATED ORAL at 17:09

## 2023-02-27 RX ADMIN — PREGABALIN 1000 MICROGRAM(S): 225 CAPSULE ORAL at 11:10

## 2023-02-27 RX ADMIN — SCOPALAMINE 1 PATCH: 1 PATCH, EXTENDED RELEASE TRANSDERMAL at 19:27

## 2023-02-27 RX ADMIN — Medication 1 MILLIGRAM(S): at 11:10

## 2023-02-27 RX ADMIN — Medication 1: at 11:46

## 2023-02-27 RX ADMIN — Medication 81 MILLIGRAM(S): at 11:10

## 2023-02-27 RX ADMIN — APIXABAN 5 MILLIGRAM(S): 2.5 TABLET, FILM COATED ORAL at 17:09

## 2023-02-27 RX ADMIN — CHLORHEXIDINE GLUCONATE 1 APPLICATION(S): 213 SOLUTION TOPICAL at 06:20

## 2023-02-27 RX ADMIN — Medication 1 APPLICATION(S): at 06:18

## 2023-02-27 RX ADMIN — Medication 1: at 00:16

## 2023-02-27 RX ADMIN — LEVETIRACETAM 1500 MILLIGRAM(S): 250 TABLET, FILM COATED ORAL at 06:13

## 2023-02-27 RX ADMIN — Medication 1 APPLICATION(S): at 05:11

## 2023-02-27 RX ADMIN — Medication 750 MILLIGRAM(S): at 09:31

## 2023-02-27 RX ADMIN — BUDESONIDE AND FORMOTEROL FUMARATE DIHYDRATE 2 PUFF(S): 160; 4.5 AEROSOL RESPIRATORY (INHALATION) at 17:16

## 2023-02-27 RX ADMIN — Medication 1000 MILLIGRAM(S): at 21:09

## 2023-02-27 RX ADMIN — APIXABAN 5 MILLIGRAM(S): 2.5 TABLET, FILM COATED ORAL at 06:13

## 2023-02-27 RX ADMIN — ATORVASTATIN CALCIUM 20 MILLIGRAM(S): 80 TABLET, FILM COATED ORAL at 21:09

## 2023-02-27 RX ADMIN — Medication 25 MILLIGRAM(S): at 06:14

## 2023-02-27 RX ADMIN — Medication 1 APPLICATION(S): at 17:10

## 2023-02-27 RX ADMIN — Medication 3 MILLIGRAM(S): at 21:09

## 2023-02-27 RX ADMIN — SCOPALAMINE 1 PATCH: 1 PATCH, EXTENDED RELEASE TRANSDERMAL at 11:41

## 2023-02-27 NOTE — PROGRESS NOTE ADULT - ASSESSMENT
73 yo male w/ PMH of COPD, CAD sp PCI w/ stent, CABG 2014, HF w/ PeF, 2nd degree heart block, sp PPM, HTN, DM, CKD Stage 3, CVA- lacunar infarcts admitted to ICU originally for cardiac arrest. ACLS for PEA arrest and ROSC returned after 5 minutes. Cardiac arrest possibly secondary to severe hypoglycemia from eating less and not tracking blood sugar carefully. Hospital course complicated by 1) prolonged hypoxemic respiratory failure. Difficult extubation requiring s/p trach and peg 2) left upper extremity/left subclavian DVT and placed on Eliquis 3) multiple infections (Enterococcal faecalis cellulitis, infected left hand hematoma, tracheobronchitis secondary to citrobacter 4) tonic clonic seizure - on Keppra / Depakote - currently undergoing reevaluation by NEUROLOGY 5) fever of 102 on 1/19.     # Acute/Chronic Respiratory failure w/ Hypoxia and Hypercapnia/ COPD, now trach-vent dependent.  - S/p intubation; failed extubation requiring s/p trach and peg   - PMV (Passy Ramesh Valve) placed on 02/15/2023.  - tolerating Trach collar, vent discontinued; continue weaning as per Pulmonary   - cont w/ Symbicort, Duonebs prn  - Surgery on board, s/p Trach change to #4 fenestrated tracheostomy placed 1/30. Replaced on 02/14/2023.  - Had VFSS/MBS eval on 02/16/2023: Recommended Puree with thin liquids  - Frequent suction  - Capping trial per pulmonary  - Pulmonary following, kain recs    # Myoclonic Seizure   - EEG: Myoclonic seizure  - Neurology on board   - cont w/ Keppra and Depakote.  - Myoclonic episode to LLE and LUE noted by his son.  - Mg 2.3 and K 4.1.  - Depakote level 55 (normal ); Keppra level pending.  - D/w Dr Loo (02/18/2023): recommended increase only the evening dose of Depakote from 750 mg to 1000 mg. and continue 750 mg.    # S/p Cardiac Arrest, acute metabolic encephalopathy due to severe hypoglycemia.  - s/p ACLS w/ ROSC after 5 min   - 2nd degree heart block, s/p PPM in place.  - ECHO Takotsubo cardiomyopathy. EF 50-55%, LVH, mild pHTN    - s/p ICU course; Hemodynamically stable now  - cont w/ Metoprolol, ASA and atorvastatin.    # Multiple infectious Disease   - tracheo-bronchitis secondary to citrobacter?? - resolved.  - enterococcal faecalis cellulitis - resolved   - infected left hand hematoma - s/p bedside debridement 12/13 w/ hand surgery   - monitor off abx              # Occlusive Left subclavian thrombosis   - LUE venous duplex : occlusive thrombus in the left mid subclavian vein with partial slow flow detected in the lateral subclavian vein.  - LUE arterial Duplex neg   - cont w/ Eliquis     # DM2   - A1c 8.8%  - cont w/ Lantus   - cont w/ FS monitoring w/ insulin s/s coverage     # HTN, Cad s/p PCI w/ stent/ CABG and HPL.  - cont with ASA, Metoprolol and Atorvastatin     # Anemia of Chronic Disease  - h/h stable, will cont  to monitor   - Hb 10.9, stable.    # Stage 3-4 sacral decubiti wound  - s/p debridement 12/19  -cont w/ wound care as recommended     # Dysphagia/ Moderate Protein Calorie Malnutrition  - Peg tube in place   - continue with tube feeding at goal as tolerated.     # Constipation  - cont w/ senna, Miralax prn     DVT prophylaxis: Eliquis.    Disposition: Patient has no insurance and Family in process of getting guardianship, court date 3/12.    73 yo male w/ PMH of COPD, CAD sp PCI w/ stent, CABG 2014, HF w/ PeF, 2nd degree heart block, sp PPM, HTN, DM, CKD Stage 3, CVA- lacunar infarcts admitted to ICU originally for cardiac arrest. ACLS for PEA arrest and ROSC returned after 5 minutes. Cardiac arrest possibly secondary to severe hypoglycemia from eating less and not tracking blood sugar carefully. Hospital course complicated by 1) prolonged hypoxemic respiratory failure. Difficult extubation requiring s/p trach and peg 2) left upper extremity/left subclavian DVT and placed on Eliquis 3) multiple infections (Enterococcal faecalis cellulitis, infected left hand hematoma, tracheobronchitis secondary to citrobacter 4) tonic clonic seizure - on Keppra / Depakote - currently undergoing reevaluation by NEUROLOGY 5) fever of 102 on 1/19.     # Acute/Chronic Respiratory failure w/ Hypoxia and Hypercapnia/ COPD, now trach-vent dependent.  - S/p intubation; failed extubation requiring s/p trach and peg   - PMV (Passy Ramesh Valve) placed on 02/15/2023.  - tolerating Trach collar, vent discontinued; continue weaning as per Pulmonary   - cont w/ Symbicort, Duonebs prn  - Surgery on board, s/p Trach change to #4 fenestrated tracheostomy placed 1/30. Replaced on 02/14/2023.  - Had VFSS/MBS eval on 02/16/2023: Recommended Puree with thin liquids, c/w diet with tube feed for supplementation   - Frequent suction  - Capping trial per pulmonary  - Pulmonary following, kain recs    # Myoclonic Seizure   - EEG: Myoclonic seizure  - Neurology on board   - cont w/ Keppra and Depakote.  - Myoclonic episode to LLE and LUE noted by his son.  - Mg 2.3 and K 4.1.  - Depakote level 55 (normal ); Keppra level pending.  - D/w Dr Loo (02/18/2023): recommended increase only the evening dose of Depakote from 750 mg to 1000 mg. and continue 750 mg.    # S/p Cardiac Arrest, acute metabolic encephalopathy due to severe hypoglycemia.  - s/p ACLS w/ ROSC after 5 min   - 2nd degree heart block, s/p PPM in place.  - ECHO Takotsubo cardiomyopathy. EF 50-55%, LVH, mild pHTN    - s/p ICU course; Hemodynamically stable now  - cont w/ Metoprolol, ASA and atorvastatin.    # Multiple infectious Disease   - tracheo-bronchitis secondary to citrobacter?? - resolved.  - enterococcal faecalis cellulitis - resolved   - infected left hand hematoma - s/p bedside debridement 12/13 w/ hand surgery   - monitor off abx              # Occlusive Left subclavian thrombosis   - LUE venous duplex : occlusive thrombus in the left mid subclavian vein with partial slow flow detected in the lateral subclavian vein.  - LUE arterial Duplex neg   - cont w/ Eliquis     # DM2   - A1c 8.8%  - cont w/ Lantus   - cont w/ FS monitoring w/ insulin s/s coverage     # HTN, Cad s/p PCI w/ stent/ CABG and HPL.  - cont with ASA, Metoprolol and Atorvastatin     # Anemia of Chronic Disease  - h/h stable, will cont  to monitor   - Hb 10.9, stable.    # Stage 3-4 sacral decubiti wound  - s/p debridement 12/19  -cont w/ wound care as recommended     # Dysphagia/ Moderate Protein Calorie Malnutrition  - Peg tube in place   - continue with tube feeding at goal as tolerated.     # Constipation  - cont w/ senna, Miralax prn     DVT prophylaxis: Eliquis.    Disposition: Patient has no insurance and Family in process of getting guardianship, court date 3/12.

## 2023-02-27 NOTE — PROGRESS NOTE ADULT - SUBJECTIVE AND OBJECTIVE BOX
Nataliia Rodríguez M.D.    Patient is a 72y old  Male who presents with a chief complaint of s/p cardiac arrest, hypoglycemia (27 Feb 2023 08:27)      SUBJECTIVE / OVERNIGHT EVENTS: no event overnight. Family at bedside this AM, reports mild epigastric discomfort.     Patient denies SOB, N/V, fever, chills, dysuria or any other complaints. All remainder ROS negative.     MEDICATIONS  (STANDING):  apixaban 5 milliGRAM(s) Oral every 12 hours  aspirin  chewable 81 milliGRAM(s) Oral daily  atorvastatin 20 milliGRAM(s) Oral at bedtime  bacitracin   Ointment 1 Application(s) Topical two times a day  bacitracin   Ointment 1 Application(s) Topical two times a day  budesonide 160 MICROgram(s)/formoterol 4.5 MICROgram(s) Inhaler 2 Puff(s) Inhalation two times a day  chlorhexidine 2% Cloths 1 Application(s) Topical <User Schedule>  cyanocobalamin 1000 MICROGram(s) Oral daily  dextrose 5%. 1000 milliLiter(s) (50 mL/Hr) IV Continuous <Continuous>  dextrose 5%. 1000 milliLiter(s) (100 mL/Hr) IV Continuous <Continuous>  dextrose 50% Injectable 25 Gram(s) IV Push once  dextrose 50% Injectable 12.5 Gram(s) IV Push once  dextrose 50% Injectable 25 Gram(s) IV Push once  diphenhydrAMINE Injectable 50 milliGRAM(s) IV Push once  folic acid 1 milliGRAM(s) Oral daily  glucagon  Injectable 1 milliGRAM(s) IntraMuscular once  glycopyrrolate 1 milliGRAM(s) Oral two times a day  insulin glargine Injectable (LANTUS) 10 Unit(s) SubCutaneous at bedtime  insulin lispro (ADMELOG) corrective regimen sliding scale   SubCutaneous every 6 hours  levETIRAcetam  Solution 1500 milliGRAM(s) Oral two times a day  metoprolol tartrate 25 milliGRAM(s) Enteral Tube two times a day  pantoprazole    Tablet 40 milliGRAM(s) Oral before breakfast  polyethylene glycol 3350 17 Gram(s) Oral daily  scopolamine 1 mG/72 Hr(s) Patch 1 Patch Transdermal every 72 hours  senna 2 Tablet(s) Oral at bedtime  silver sulfADIAZINE 1% Cream 1 Application(s) Topical two times a day  valproic  acid Syrup 1000 milliGRAM(s) Oral <User Schedule>  valproic  acid Syrup 750 milliGRAM(s) Oral <User Schedule>  vitamin A &amp; D Ointment 1 Application(s) Topical two times a day    MEDICATIONS  (PRN):  acetaminophen     Tablet .. 650 milliGRAM(s) Oral every 6 hours PRN Temp greater or equal to 38C (100.4F), Mild Pain (1 - 3)  albuterol/ipratropium for Nebulization 3 milliLiter(s) Nebulizer every 6 hours PRN Shortness of Breath and/or Wheezing  aluminum hydroxide/magnesium hydroxide/simethicone Suspension 30 milliLiter(s) Oral every 4 hours PRN Dyspepsia  dextrose Oral Gel 15 Gram(s) Oral once PRN Blood Glucose LESS THAN 70 milliGRAM(s)/deciliter  nitroglycerin     SubLingual 0.4 milliGRAM(s) SubLingual every 5 minutes PRN Chest Pain      I&O's Summary    26 Feb 2023 07:01  -  27 Feb 2023 07:00  --------------------------------------------------------  IN: 0 mL / OUT: 1 mL / NET: -1 mL        PHYSICAL EXAM:  Vital Signs Last 24 Hrs  T(C): 37.2 (27 Feb 2023 10:21), Max: 37.2 (27 Feb 2023 10:21)  T(F): 98.9 (27 Feb 2023 10:21), Max: 98.9 (27 Feb 2023 10:21)  HR: 68 (27 Feb 2023 10:21) (68 - 98)  BP: 117/72 (27 Feb 2023 10:21) (109/66 - 134/80)  BP(mean): --  RR: 18 (27 Feb 2023 10:21) (17 - 20)  SpO2: 100% (27 Feb 2023 10:21) (96% - 100%)    Parameters below as of 27 Feb 2023 10:21  Patient On (Oxygen Delivery Method): tracheostomy collar    Gen: NAD, +trach capped, on NC  HEENT: NCAT, EOMI, PERRLA, Pupils_  CVS: RRR, +S1/S2, no murmurs, rubs or gallops appreciated  Lungs: CTAB, no wheeze, rales, rhonchi  Abdomen: +BS, + PEG  Ext: No cyanosis, edema or calf tenderness  Neuro: AAOx3, no focal deficits.    LABS:                        11.1   7.99  )-----------( 215      ( 27 Feb 2023 07:12 )             34.4     02-27    138  |  100  |  24<H>  ----------------------------<  118<H>  3.9   |  34<H>  |  0.78    Ca    8.6      27 Feb 2023 07:12  Phos  3.4     02-27  Mg     2.2     02-27                CAPILLARY BLOOD GLUCOSE      POCT Blood Glucose.: 191 mg/dL (27 Feb 2023 11:21)  POCT Blood Glucose.: 136 mg/dL (27 Feb 2023 07:48)  POCT Blood Glucose.: 105 mg/dL (27 Feb 2023 06:07)  POCT Blood Glucose.: 151 mg/dL (27 Feb 2023 00:12)  POCT Blood Glucose.: 125 mg/dL (26 Feb 2023 21:18)  POCT Blood Glucose.: 195 mg/dL (26 Feb 2023 17:45)      RADIOLOGY & ADDITIONAL TESTS:  Results Reviewed:   Imaging Personally Reviewed:  Electrocardiogram Personally Reviewed:

## 2023-02-27 NOTE — PROGRESS NOTE ADULT - SUBJECTIVE AND OBJECTIVE BOX
BRANDON MÉNDEZL    [facility] [address]    Allergies    No Known Allergies    Intolerances        PAST MEDICAL & SURGICAL HISTORY:  HTN (hypertension)      HLD (hyperlipidemia)      Diabetes mellitus      Lacunar infarction      BPH (benign prostatic hyperplasia)      CHF (congestive heart failure)      Chronic obstructive pulmonary disease, unspecified COPD type      S/P CABG (coronary artery bypass graft)  2014          FAMILY HISTORY:      Home Medications:  aspirin 81 mg oral delayed release tablet: 1 tab(s) orally once a day (12 Jun 2020 17:35)  Liquifilm Tears preserved ophthalmic solution: 1 drop(s) to each affected eye 2 times a day (12 Jun 2020 17:35)      MEDICATIONS  (STANDING):  apixaban 5 milliGRAM(s) Oral every 12 hours  aspirin  chewable 81 milliGRAM(s) Oral daily  atorvastatin 20 milliGRAM(s) Oral at bedtime  bacitracin   Ointment 1 Application(s) Topical two times a day  bacitracin   Ointment 1 Application(s) Topical two times a day  budesonide 160 MICROgram(s)/formoterol 4.5 MICROgram(s) Inhaler 2 Puff(s) Inhalation two times a day  chlorhexidine 2% Cloths 1 Application(s) Topical <User Schedule>  cyanocobalamin 1000 MICROGram(s) Oral daily  dextrose 5%. 1000 milliLiter(s) (100 mL/Hr) IV Continuous <Continuous>  dextrose 5%. 1000 milliLiter(s) (50 mL/Hr) IV Continuous <Continuous>  dextrose 50% Injectable 25 Gram(s) IV Push once  dextrose 50% Injectable 12.5 Gram(s) IV Push once  dextrose 50% Injectable 25 Gram(s) IV Push once  diphenhydrAMINE Injectable 50 milliGRAM(s) IV Push once  folic acid 1 milliGRAM(s) Oral daily  glucagon  Injectable 1 milliGRAM(s) IntraMuscular once  glycopyrrolate 1 milliGRAM(s) Oral two times a day  insulin glargine Injectable (LANTUS) 10 Unit(s) SubCutaneous at bedtime  insulin lispro (ADMELOG) corrective regimen sliding scale   SubCutaneous every 6 hours  levETIRAcetam  Solution 1500 milliGRAM(s) Oral two times a day  metoprolol tartrate 25 milliGRAM(s) Enteral Tube two times a day  polyethylene glycol 3350 17 Gram(s) Oral daily  scopolamine 1 mG/72 Hr(s) Patch 1 Patch Transdermal every 72 hours  senna 2 Tablet(s) Oral at bedtime  silver sulfADIAZINE 1% Cream 1 Application(s) Topical two times a day  valproic  acid Syrup 1000 milliGRAM(s) Oral <User Schedule>  valproic  acid Syrup 750 milliGRAM(s) Oral <User Schedule>  vitamin A &amp; D Ointment 1 Application(s) Topical two times a day    MEDICATIONS  (PRN):  acetaminophen     Tablet .. 650 milliGRAM(s) Oral every 6 hours PRN Temp greater or equal to 38C (100.4F), Mild Pain (1 - 3)  albuterol/ipratropium for Nebulization 3 milliLiter(s) Nebulizer every 6 hours PRN Shortness of Breath and/or Wheezing  aluminum hydroxide/magnesium hydroxide/simethicone Suspension 30 milliLiter(s) Oral every 4 hours PRN Dyspepsia  dextrose Oral Gel 15 Gram(s) Oral once PRN Blood Glucose LESS THAN 70 milliGRAM(s)/deciliter      Diet, NPO with Tube Feed:   Tube Feeding Modality: Gastrostomy  Glucerna 1.2 Pedrito  Total Volume for 24 Hours (mL): 1440  Continuous  Until Goal Tube Feed Rate (mL per Hour): 60  Tube Feed Duration (in Hours): 24  Tube Feed Start Time: 14:30  Free Water Flush  Free Water Flush Instructions:  30 ml/hr via pump  Liquid Protein Supplement     Qty per Day:  1 (01-09-23 @ 13:45) [Active]          Vital Signs Last 24 Hrs  T(C): 36.2 (27 Feb 2023 05:20), Max: 37.3 (26 Feb 2023 10:33)  T(F): 97.2 (27 Feb 2023 05:20), Max: 99.2 (26 Feb 2023 10:33)  HR: 78 (27 Feb 2023 05:20) (68 - 98)  BP: 122/72 (27 Feb 2023 05:20) (109/66 - 134/80)  BP(mean): --  RR: 20 (27 Feb 2023 05:20) (17 - 20)  SpO2: 96% (27 Feb 2023 06:27) (96% - 100%)    Parameters below as of 27 Feb 2023 06:27  Patient On (Oxygen Delivery Method): tracheostomy collar,28 02-26-23 @ 07:01  -  02-27-23 @ 07:00  --------------------------------------------------------  IN: 0 mL / OUT: 1 mL / NET: -1 mL              LABS:                        11.1   7.99  )-----------( 215      ( 27 Feb 2023 07:12 )             34.4                     WBC:  WBC Count: 7.99 K/uL (02-27 @ 07:12)  WBC Count: 7.82 K/uL (02-24 @ 07:00)      MICROBIOLOGY:  RECENT CULTURES:                  Sodium:  Sodium, Serum: 140 mmol/L (02-24 @ 07:00)      0.74 mg/dL 02-24 @ 07:00      Hemoglobin:  Hemoglobin: 11.1 g/dL (02-27 @ 07:12)  Hemoglobin: 11.1 g/dL (02-24 @ 07:00)      Platelets: Platelet Count - Automated: 215 K/uL (02-27 @ 07:12)  Platelet Count - Automated: 201 K/uL (02-24 @ 07:00)              RADIOLOGY & ADDITIONAL STUDIES:      MICROBIOLOGY:  RECENT CULTURES:

## 2023-02-27 NOTE — PROGRESS NOTE ADULT - ASSESSMENT
REVIEW OF SYMPTOMS      Able to give (reliable) ROS  NO     PHYSICAL EXAM    HEENT Unremarkable  atraumatic   RESP Fair air entry EXP prolonged    Harsh breath sound Resp distres mild   CARDIAC S1 S2 No S3     NO JVD    ABDOMEN SOFT BS PRESENT NOT DISTENDED No hepatosplenomegaly   PEDAL EDEMA present No calf tenderness  NO rash     GENERAL DATA .   GOC.  12/11/2022 full code       ALLGY.  nka                            WT. ..  12/2/2022 86  BMI. ..    12/2/2022 29                          ICU STAY.  .. 11/29-12/9  COVID.   .. 12/2/2022 scv2 (-)   .. 11/20/2022 scv2 (-)   BEST PRACTICE ISSUES.    HOB ELEVATN. Yes  DVT PPLX.    .. 1/3/2023 apixa 5.2 (vte)    (DVT 12/12 l Subclav throm)  ALEJO PPLX.   INFN PPLX.   .. 11/14 chlorhex 2%   SP SW ANTWAN.         DIET.    ..  12/15 glucerna 1.2 1440 gt   IV fl.    PROCEDURES.  .. 2/15/2023 pmvaslve placd   .. 1/28  size 4 fenestrated cuffed trach placed by surgery     ABGS.  1/6/2023 ac 16/450/.3/5 746/49/123     VS/ PO/IO/ VENT/ DRIPS.  2/27/2023 afeb 60 110/60   2/27/2023 tc 28% 98%     PATIENT PRESENTATION.  72 m doa 11/14/2022 cac    PMH  PMH CAD s/p PCI with stent 2015 and CABG 2014,   PMH  s/p medtronic PPM,   PMH CVA (lacunar infarct)    HOSPITAL COURSE   .. 1) PEA arrest with ROSC after approximately 5 min 11/14  Now communicative   .. 2) DVT 12/12 l Subclav thromS 12/15/2022 lvnx 80.2 1/3 changed to apixaban 5.2  .. 3) TRACH 12/5/2022 trach  .. 4) PEG12/5/2022 peg   .. 5) Cellulitis hand absc 12/13 mod enterococcus fecalis  staph epi 12/12-12/15/2022  cephalexin 12/15 vanco once  12/16-12/19 zosyn  .. 6) Vent weaning       PROBLEM ASSESSMENT RECOMMENDATIONS.  Trach 12/5   .. trach care   Trach change  ..  1/28 size 4 fenestrated cuffed trach  Trach wean.  .. 2/6/2023 pt still has lot of secretions  .. 2/27/2023 dw nurs Pt still needing lot of suctioning   VTE  .. On apixaba  INFECTION.  .. w 1/17-1/18-1/20-1/21-2/3-2/8-2/10-2/15-2/27/2023      w 8.3- 7.9 - 6.8 - 7.2 - 8.8  - 10- 10.8- 10.8-7.9    .. pr 1/17-1/20/2023 (-)  (-)   .. 1/16-1/20/2023 Rocephin started by hospitalist dced   CAD.  .. 11/14 asa 81   .. 12/13 metoprolol 25.2   CHF   .. Cr 2/6/2023 Cr .7   .. 11/14/2022 echo mod decr segmental lvsf apical lateral apiccal ant segment are abn takotsubo cmpthy pasp 42 .  .. Monitor for chf has chr hfref per echo   COPD   .. 2/1 duoneb p   .. 12/30 symbicort 160   .. 1/7/2023 glycopyrrolate 1.2   .. 1/9/2023 ipratropium  .. 1/15/2023 scopolamine   .. continue bd ics for copd   Anemia.  .. Hb 1/12-1/14-1/19-1/20-7/21-1/25-2/3-2/8-2/10-2/15-2/27/2023       Hb 9.8- 10 -9.1- 9.8  - 9.2 - 9.5 -10- 10 - 10.5 -10.8- 11   .. target hb 7 (+)  .. monitor   sp cac   .. good neuro recovery awake alert interactive   VTE  .. 1/3/2023 apixa 5.2 (vte)    FAMILY COMMUNICATION.  .. 2/12/2023 dw pt son  will ask speech eval for passey cordelia valve  .. 2/12/2023 Pt has a fenestrated trach and has fenestrated inner cannula which is available to place before trying passy cordelia valve     OVERALL   WEANED OFF VENT 1/23   TRACH WEANING    Will consider decannulation when secretions decrease  2/14/2023 dw speech they will try PM valve with fenestrated cannula   2/16/2023 tolerating pm valve  PASSY CORDELIA VALVE   .. 2/15/2023 placed pmv during day time  REHAB 2/16/2023 pt eval requested   FLAILING OF LOWER EXTR   .. Jersks started 1/16   .. 1/19/2023 myoclonic jerks improvd on depakote       TIME SPENT   Over 25 minutes aggregate care time spent on encounter; activities included   direct patient care, counseling and/or coordinating care reviewing notes, lab data/ imaging , discussion with multidisciplinary team/ patient  /family and explaining in detail risks, benefits, alternatives  of the recommendations     BRANDON CAMARILLO

## 2023-02-28 LAB
GLUCOSE BLDC GLUCOMTR-MCNC: 134 MG/DL — HIGH (ref 70–99)
GLUCOSE BLDC GLUCOMTR-MCNC: 141 MG/DL — HIGH (ref 70–99)
GLUCOSE BLDC GLUCOMTR-MCNC: 159 MG/DL — HIGH (ref 70–99)
GLUCOSE BLDC GLUCOMTR-MCNC: 164 MG/DL — HIGH (ref 70–99)
GLUCOSE BLDC GLUCOMTR-MCNC: 187 MG/DL — HIGH (ref 70–99)
GLUCOSE BLDC GLUCOMTR-MCNC: 196 MG/DL — HIGH (ref 70–99)

## 2023-02-28 PROCEDURE — 99232 SBSQ HOSP IP/OBS MODERATE 35: CPT

## 2023-02-28 RX ADMIN — Medication 1 APPLICATION(S): at 05:05

## 2023-02-28 RX ADMIN — APIXABAN 5 MILLIGRAM(S): 2.5 TABLET, FILM COATED ORAL at 05:03

## 2023-02-28 RX ADMIN — BUDESONIDE AND FORMOTEROL FUMARATE DIHYDRATE 2 PUFF(S): 160; 4.5 AEROSOL RESPIRATORY (INHALATION) at 17:29

## 2023-02-28 RX ADMIN — Medication 1 APPLICATION(S): at 17:30

## 2023-02-28 RX ADMIN — ROBINUL 1 MILLIGRAM(S): 0.2 INJECTION INTRAMUSCULAR; INTRAVENOUS at 17:21

## 2023-02-28 RX ADMIN — ROBINUL 1 MILLIGRAM(S): 0.2 INJECTION INTRAMUSCULAR; INTRAVENOUS at 05:03

## 2023-02-28 RX ADMIN — Medication 1: at 12:17

## 2023-02-28 RX ADMIN — PREGABALIN 1000 MICROGRAM(S): 225 CAPSULE ORAL at 12:18

## 2023-02-28 RX ADMIN — POLYETHYLENE GLYCOL 3350 17 GRAM(S): 17 POWDER, FOR SOLUTION ORAL at 12:18

## 2023-02-28 RX ADMIN — CHLORHEXIDINE GLUCONATE 1 APPLICATION(S): 213 SOLUTION TOPICAL at 05:03

## 2023-02-28 RX ADMIN — Medication 25 MILLIGRAM(S): at 05:04

## 2023-02-28 RX ADMIN — APIXABAN 5 MILLIGRAM(S): 2.5 TABLET, FILM COATED ORAL at 17:21

## 2023-02-28 RX ADMIN — Medication 1 APPLICATION(S): at 05:04

## 2023-02-28 RX ADMIN — LEVETIRACETAM 1500 MILLIGRAM(S): 250 TABLET, FILM COATED ORAL at 05:03

## 2023-02-28 RX ADMIN — Medication 1 MILLIGRAM(S): at 12:17

## 2023-02-28 RX ADMIN — Medication 81 MILLIGRAM(S): at 12:18

## 2023-02-28 RX ADMIN — Medication 25 MILLIGRAM(S): at 17:21

## 2023-02-28 RX ADMIN — SCOPALAMINE 1 PATCH: 1 PATCH, EXTENDED RELEASE TRANSDERMAL at 08:17

## 2023-02-28 RX ADMIN — Medication 1: at 23:59

## 2023-02-28 RX ADMIN — Medication 750 MILLIGRAM(S): at 08:58

## 2023-02-28 RX ADMIN — PANTOPRAZOLE SODIUM 40 MILLIGRAM(S): 20 TABLET, DELAYED RELEASE ORAL at 05:04

## 2023-02-28 RX ADMIN — BUDESONIDE AND FORMOTEROL FUMARATE DIHYDRATE 2 PUFF(S): 160; 4.5 AEROSOL RESPIRATORY (INHALATION) at 05:06

## 2023-02-28 RX ADMIN — LEVETIRACETAM 1500 MILLIGRAM(S): 250 TABLET, FILM COATED ORAL at 17:22

## 2023-02-28 NOTE — PROGRESS NOTE ADULT - SUBJECTIVE AND OBJECTIVE BOX
Nataliia Rodríguez M.D.    Patient is a 72y old  Male who presents with a chief complaint of s/p cardiac arrest, hypoglycemia (28 Feb 2023 09:54)      SUBJECTIVE / OVERNIGHT EVENTS: no event overnight.     Patient denies chest pain, SOB, abd pain, N/V, fever, chills, dysuria or any other complaints. All remainder ROS negative.     MEDICATIONS  (STANDING):  apixaban 5 milliGRAM(s) Oral every 12 hours  aspirin  chewable 81 milliGRAM(s) Oral daily  atorvastatin 20 milliGRAM(s) Oral at bedtime  bacitracin   Ointment 1 Application(s) Topical two times a day  bacitracin   Ointment 1 Application(s) Topical two times a day  budesonide 160 MICROgram(s)/formoterol 4.5 MICROgram(s) Inhaler 2 Puff(s) Inhalation two times a day  chlorhexidine 2% Cloths 1 Application(s) Topical <User Schedule>  cyanocobalamin 1000 MICROGram(s) Oral daily  dextrose 5%. 1000 milliLiter(s) (50 mL/Hr) IV Continuous <Continuous>  dextrose 5%. 1000 milliLiter(s) (100 mL/Hr) IV Continuous <Continuous>  dextrose 50% Injectable 25 Gram(s) IV Push once  dextrose 50% Injectable 12.5 Gram(s) IV Push once  dextrose 50% Injectable 25 Gram(s) IV Push once  diphenhydrAMINE Injectable 50 milliGRAM(s) IV Push once  folic acid 1 milliGRAM(s) Oral daily  glucagon  Injectable 1 milliGRAM(s) IntraMuscular once  glycopyrrolate 1 milliGRAM(s) Oral two times a day  insulin glargine Injectable (LANTUS) 10 Unit(s) SubCutaneous at bedtime  insulin lispro (ADMELOG) corrective regimen sliding scale   SubCutaneous every 6 hours  levETIRAcetam  Solution 1500 milliGRAM(s) Oral two times a day  metoprolol tartrate 25 milliGRAM(s) Enteral Tube two times a day  pantoprazole   Suspension 40 milliGRAM(s) Oral before breakfast  polyethylene glycol 3350 17 Gram(s) Oral daily  scopolamine 1 mG/72 Hr(s) Patch 1 Patch Transdermal every 72 hours  senna 2 Tablet(s) Oral at bedtime  silver sulfADIAZINE 1% Cream 1 Application(s) Topical two times a day  valproic  acid Syrup 1000 milliGRAM(s) Oral <User Schedule>  valproic  acid Syrup 750 milliGRAM(s) Oral <User Schedule>  vitamin A &amp; D Ointment 1 Application(s) Topical two times a day    MEDICATIONS  (PRN):  acetaminophen     Tablet .. 650 milliGRAM(s) Oral every 6 hours PRN Temp greater or equal to 38C (100.4F), Mild Pain (1 - 3)  albuterol/ipratropium for Nebulization 3 milliLiter(s) Nebulizer every 6 hours PRN Shortness of Breath and/or Wheezing  aluminum hydroxide/magnesium hydroxide/simethicone Suspension 30 milliLiter(s) Oral every 4 hours PRN Dyspepsia  dextrose Oral Gel 15 Gram(s) Oral once PRN Blood Glucose LESS THAN 70 milliGRAM(s)/deciliter  melatonin 3 milliGRAM(s) Oral at bedtime PRN Insomnia  nitroglycerin     SubLingual 0.4 milliGRAM(s) SubLingual every 5 minutes PRN Chest Pain      I&O's Summary    27 Feb 2023 07:01  -  28 Feb 2023 07:00  --------------------------------------------------------  IN: 0 mL / OUT: 600 mL / NET: -600 mL    28 Feb 2023 07:01  -  28 Feb 2023 10:24  --------------------------------------------------------  IN: 120 mL / OUT: 0 mL / NET: 120 mL        PHYSICAL EXAM:  Vital Signs Last 24 Hrs  T(C): 36.7 (28 Feb 2023 10:19), Max: 36.8 (27 Feb 2023 16:35)  T(F): 98 (28 Feb 2023 10:19), Max: 98.3 (27 Feb 2023 23:41)  HR: 66 (28 Feb 2023 10:19) (65 - 70)  BP: 110/67 (28 Feb 2023 10:19) (110/67 - 138/76)  BP(mean): --  RR: 19 (28 Feb 2023 10:19) (18 - 20)  SpO2: 95% (28 Feb 2023 10:19) (95% - 100%)    Parameters below as of 28 Feb 2023 10:19  Patient On (Oxygen Delivery Method): room air    Gen: NAD, +trach capped, on NC  HEENT: NCAT, EOMI, PERRLA, Pupils_  CVS: RRR, +S1/S2, no murmurs, rubs or gallops appreciated  Lungs: CTAB, no wheeze, rales, rhonchi  Abdomen: +BS, + PEG  Ext: No cyanosis, edema or calf tenderness  Neuro: AAOx3, no focal deficits.    LABS:                        11.1   7.99  )-----------( 215      ( 27 Feb 2023 07:12 )             34.4     02-27    138  |  100  |  24<H>  ----------------------------<  118<H>  3.9   |  34<H>  |  0.78    Ca    8.6      27 Feb 2023 07:12  Phos  3.4     02-27  Mg     2.2     02-27                CAPILLARY BLOOD GLUCOSE      POCT Blood Glucose.: 159 mg/dL (28 Feb 2023 07:36)  POCT Blood Glucose.: 141 mg/dL (28 Feb 2023 04:56)  POCT Blood Glucose.: 214 mg/dL (27 Feb 2023 20:54)  POCT Blood Glucose.: 131 mg/dL (27 Feb 2023 16:43)  POCT Blood Glucose.: 191 mg/dL (27 Feb 2023 11:21)      RADIOLOGY & ADDITIONAL TESTS:  Results Reviewed:   Imaging Personally Reviewed:  Electrocardiogram Personally Reviewed:

## 2023-02-28 NOTE — PROGRESS NOTE ADULT - ASSESSMENT
REVIEW OF SYMPTOMS      Able to give (reliable) ROS  NO     PHYSICAL EXAM    HEENT Unremarkable  atraumatic   RESP Fair air entry EXP prolonged    Harsh breath sound Resp distres mild   CARDIAC S1 S2 No S3     NO JVD    ABDOMEN SOFT BS PRESENT NOT DISTENDED No hepatosplenomegaly   PEDAL EDEMA present No calf tenderness  NO rash       GENERAL DATA .   GOC.  12/11/2022 full code       ALLGY.  nka                            WT. ..  12/2/2022 86  BMI. ..    12/2/2022 29                          ICU STAY.  .. 11/29-12/9  COVID.   .. 12/2/2022 scv2 (-)   .. 11/20/2022 scv2 (-)   BEST PRACTICE ISSUES.    HOB ELEVATN. Yes  DVT PPLX.    .. 1/3/2023 apixa 5.2 (vte)    (DVT 12/12 l Subclav throm)  ALEJO PPLX.   INFN PPLX.   .. 11/14 chlorhex 2%   SP SW ANTWAN.         DIET.    ..  12/15 glucerna 1.2 1440 gt   IV fl.    PROCEDURES.  .. 2/15/2023 pmvaslve placd   .. 1/28  size 4 fenestrated cuffed trach placed by surgery     ABGS.  1/6/2023 ac 16/450/.3/5 746/49/123     VS/ PO/IO/ VENT/ DRIPS.  2/28/2023 99f 70 110/60   2/28/2023 tc 28% po 99%     PATIENT PRESENTATION.  72 m doa 11/14/2022 cac    PMH  PMH CAD s/p PCI with stent 2015 and CABG 2014,   PMH  s/p medtronic PPM,   PMH CVA (lacunar infarct)    HOSPITAL COURSE   .. 1) PEA arrest with ROSC after approximately 5 min 11/14  Now communicative   .. 2) DVT 12/12 l Subclav thromS 12/15/2022 lvnx 80.2 1/3 changed to apixaban 5.2  .. 3) TRACH 12/5/2022 trach  .. 4) PEG12/5/2022 peg   .. 5) Cellulitis hand absc 12/13 mod enterococcus fecalis  staph epi 12/12-12/15/2022  cephalexin 12/15 vanco once  12/16-12/19 zosyn  .. 6) Vent weaning     PROBLEM ASSESSMENT RECOMMENDATIONS.  Trach 12/5   .. trach care   Trach change  ..  1/28 size 4 fenestrated cuffed trach  Trach wean.  .. 2/6/2023 pt still has lot of secretions  .. 2/27/2023 dw nurs Pt still needing lot of suctioning   .. 2/28/2023 needed lesser suctioning   VTE  .. On apixaba  INFECTION.  .. w 1/17-1/18-1/20-1/21-2/3-2/8-2/10-2/15-2/27/2023      w 8.3- 7.9 - 6.8 - 7.2 - 8.8  - 10- 10.8- 10.8-7.9    .. pr 1/17-1/20/2023 (-)  (-)   .. 1/16-1/20/2023 Rocephin started by hospitalist dced   CAD.  .. 11/14 asa 81   .. 12/13 metoprolol 25.2   CHF   .. Cr 2/6/2023 Cr .7   .. 11/14/2022 echo mod decr segmental lvsf apical lateral apiccal ant segment are abn takotsubo cmpthy pasp 42 .  .. Monitor for chf has chr hfref per echo   COPD   .. 2/1 duoneb p   .. 12/30 symbicort 160   .. 1/7/2023 glycopyrrolate 1.2   .. 1/9/2023 ipratropium  .. 1/15/2023 scopolamine   .. continue bd ics for copd   Anemia.  .. Hb 1/12-1/14-1/19-1/20-7/21-1/25-2/3-2/8-2/10-2/15-2/27/2023       Hb 9.8- 10 -9.1- 9.8  - 9.2 - 9.5 -10- 10 - 10.5 -10.8- 11   .. target hb 7 (+)  .. monitor   sp cac   .. good neuro recovery awake alert interactive   VTE  .. 1/3/2023 apixa 5.2 (vte)    FAMILY COMMUNICATION.  .. 2/12/2023 dw pt son  will ask speech eval for passey cordelia valve  .. 2/12/2023 Pt has a fenestrated trach and has fenestrated inner cannula which is available to place before trying passy cordelia valve     OVERALL   WEANED OFF VENT 1/23   TRACH WEANING    Will consider decannulation when secretions decrease  2/14/2023 dw speech they will try PM valve with fenestrated cannula   2/16/2023 tolerating pm valve  PASSY CORDELIA VALVE   .. 2/15/2023 placed pmv during day time  REHAB 2/16/2023 pt eval requested   FLAILING OF LOWER EXTR   .. Jersks started 1/16   .. 1/19/2023 myoclonic jerks improvd on depakote       TIME SPENT   Over 25 minutes aggregate care time spent on encounter; activities included   direct patient care, counseling and/or coordinating care reviewing notes, lab data/ imaging , discussion with multidisciplinary team/ patient  /family and explaining in detail risks, benefits, alternatives  of the recommendations     BRANDON CAMARILLO

## 2023-02-28 NOTE — PROGRESS NOTE ADULT - SUBJECTIVE AND OBJECTIVE BOX
BRANDON MÉNDEZL    [facility] [address]    Allergies    No Known Allergies    Intolerances        PAST MEDICAL & SURGICAL HISTORY:  HTN (hypertension)      HLD (hyperlipidemia)      Diabetes mellitus      Lacunar infarction      BPH (benign prostatic hyperplasia)      CHF (congestive heart failure)      Chronic obstructive pulmonary disease, unspecified COPD type      S/P CABG (coronary artery bypass graft)  2014          FAMILY HISTORY:      Home Medications:  aspirin 81 mg oral delayed release tablet: 1 tab(s) orally once a day (12 Jun 2020 17:35)  Liquifilm Tears preserved ophthalmic solution: 1 drop(s) to each affected eye 2 times a day (12 Jun 2020 17:35)      MEDICATIONS  (STANDING):  apixaban 5 milliGRAM(s) Oral every 12 hours  aspirin  chewable 81 milliGRAM(s) Oral daily  atorvastatin 20 milliGRAM(s) Oral at bedtime  bacitracin   Ointment 1 Application(s) Topical two times a day  bacitracin   Ointment 1 Application(s) Topical two times a day  budesonide 160 MICROgram(s)/formoterol 4.5 MICROgram(s) Inhaler 2 Puff(s) Inhalation two times a day  chlorhexidine 2% Cloths 1 Application(s) Topical <User Schedule>  cyanocobalamin 1000 MICROGram(s) Oral daily  dextrose 5%. 1000 milliLiter(s) (100 mL/Hr) IV Continuous <Continuous>  dextrose 5%. 1000 milliLiter(s) (50 mL/Hr) IV Continuous <Continuous>  dextrose 50% Injectable 25 Gram(s) IV Push once  dextrose 50% Injectable 12.5 Gram(s) IV Push once  dextrose 50% Injectable 25 Gram(s) IV Push once  diphenhydrAMINE Injectable 50 milliGRAM(s) IV Push once  folic acid 1 milliGRAM(s) Oral daily  glucagon  Injectable 1 milliGRAM(s) IntraMuscular once  glycopyrrolate 1 milliGRAM(s) Oral two times a day  insulin glargine Injectable (LANTUS) 10 Unit(s) SubCutaneous at bedtime  insulin lispro (ADMELOG) corrective regimen sliding scale   SubCutaneous every 6 hours  levETIRAcetam  Solution 1500 milliGRAM(s) Oral two times a day  metoprolol tartrate 25 milliGRAM(s) Enteral Tube two times a day  pantoprazole   Suspension 40 milliGRAM(s) Oral before breakfast  polyethylene glycol 3350 17 Gram(s) Oral daily  scopolamine 1 mG/72 Hr(s) Patch 1 Patch Transdermal every 72 hours  senna 2 Tablet(s) Oral at bedtime  silver sulfADIAZINE 1% Cream 1 Application(s) Topical two times a day  valproic  acid Syrup 1000 milliGRAM(s) Oral <User Schedule>  valproic  acid Syrup 750 milliGRAM(s) Oral <User Schedule>  vitamin A &amp; D Ointment 1 Application(s) Topical two times a day    MEDICATIONS  (PRN):  acetaminophen     Tablet .. 650 milliGRAM(s) Oral every 6 hours PRN Temp greater or equal to 38C (100.4F), Mild Pain (1 - 3)  albuterol/ipratropium for Nebulization 3 milliLiter(s) Nebulizer every 6 hours PRN Shortness of Breath and/or Wheezing  aluminum hydroxide/magnesium hydroxide/simethicone Suspension 30 milliLiter(s) Oral every 4 hours PRN Dyspepsia  dextrose Oral Gel 15 Gram(s) Oral once PRN Blood Glucose LESS THAN 70 milliGRAM(s)/deciliter  melatonin 3 milliGRAM(s) Oral at bedtime PRN Insomnia  nitroglycerin     SubLingual 0.4 milliGRAM(s) SubLingual every 5 minutes PRN Chest Pain      Diet, Pureed:   Tube Feeding Modality: Gastrostomy  Glucerna 1.2 Pedrito  Total Volume for 24 Hours (mL): 1440  Continuous  Starting Tube Feed Rate mL per Hour: 60  Until Goal Tube Feed Rate (mL per Hour): 60  Tube Feed Duration (in Hours): 24  Tube Feed Start Time: 15:30  Free Water Flush  Pump   Rate (mL per Hour): 30  Liquid Protein Supplement     Qty per Day:  1 (02-27-23 @ 15:25) [Active]          Vital Signs Last 24 Hrs  T(C): 36.7 (28 Feb 2023 04:46), Max: 37.2 (27 Feb 2023 10:21)  T(F): 98 (28 Feb 2023 04:46), Max: 98.9 (27 Feb 2023 10:21)  HR: 70 (28 Feb 2023 09:31) (65 - 70)  BP: 138/76 (28 Feb 2023 04:46) (113/66 - 138/76)  BP(mean): --  RR: 18 (28 Feb 2023 09:31) (18 - 20)  SpO2: 97% (28 Feb 2023 09:31) (95% - 100%)    Parameters below as of 28 Feb 2023 09:31  Patient On (Oxygen Delivery Method): room air          02-27-23 @ 07:01  -  02-28-23 @ 07:00  --------------------------------------------------------  IN: 0 mL / OUT: 600 mL / NET: -600 mL              LABS:                        11.1   7.99  )-----------( 215      ( 27 Feb 2023 07:12 )             34.4     02-27    138  |  100  |  24<H>  ----------------------------<  118<H>  3.9   |  34<H>  |  0.78    Ca    8.6      27 Feb 2023 07:12  Phos  3.4     02-27  Mg     2.2     02-27                WBC:  WBC Count: 7.99 K/uL (02-27 @ 07:12)      MICROBIOLOGY:  RECENT CULTURES:                  Sodium:  Sodium, Serum: 138 mmol/L (02-27 @ 07:12)      0.78 mg/dL 02-27 @ 07:12      Hemoglobin:  Hemoglobin: 11.1 g/dL (02-27 @ 07:12)      Platelets: Platelet Count - Automated: 215 K/uL (02-27 @ 07:12)              RADIOLOGY & ADDITIONAL STUDIES:      MICROBIOLOGY:  RECENT CULTURES:

## 2023-02-28 NOTE — PROGRESS NOTE ADULT - ASSESSMENT
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 ACLS for PEA arrest and ROSC returned after 5 minutes. Cardiac arrest possibly secondary to severe hypoglycemia from eating less and not tracking blood sugar carefully. Hospital course complicated by 1) prolonged hypoxemic respiratory failure. Difficult extubation requiring s/p trach and peg 2) left upper extremity/left subclavian DVT and placed on Eliquis 3) multiple infections (Enterococcal faecalis cellulitis, infected left hand hematoma, tracheobronchitis secondary to citrobacter 4) tonic clonic seizure - on Keppra / Depakote - currently undergoing reevaluation by NEUROLOGY 5) fever of 102 on 1/19.     # Acute/Chronic Respiratory failure w/ Hypoxia and Hypercapnia/ COPD, now trach-vent dependent.  - S/p intubation; failed extubation requiring s/p trach and peg   - PMV (Passy Ramesh Valve) placed on 02/15/2023.  - tolerating Trach collar, vent discontinued; continue weaning as per Pulmonary   - cont w/ Symbicort, Duonebs prn  - Surgery on board, s/p Trach change to #4 fenestrated tracheostomy placed 1/30. Replaced on 02/14/2023.  - Had VFSS/MBS eval on 02/16/2023: Recommended Puree with thin liquids, c/w diet with tube feed for supplementation   - Frequent suction  - Capping trial per pulmonary  - Pulmonary following, kain recs    # Myoclonic Seizure   - EEG: Myoclonic seizure  - Neurology on board   - cont w/ Keppra and Depakote.  - Myoclonic episode to LLE and LUE noted by his son.  - Mg 2.3 and K 4.1.  - Depakote level 55 (normal ); Keppra level pending.  - D/w Dr Loo (02/18/2023): recommended increase only the evening dose of Depakote from 750 mg to 1000 mg. and continue 750 mg.    # S/p Cardiac Arrest, acute metabolic encephalopathy due to severe hypoglycemia.  - s/p ACLS w/ ROSC after 5 min   - 2nd degree heart block, s/p PPM in place.  - ECHO Takotsubo cardiomyopathy. EF 50-55%, LVH, mild pHTN    - s/p ICU course; Hemodynamically stable now  - cont w/ Metoprolol, ASA and atorvastatin.    # Multiple infectious Disease   - tracheo-bronchitis secondary to citrobacter?? - resolved.  - enterococcal faecalis cellulitis - resolved   - infected left hand hematoma - s/p bedside debridement 12/13 w/ hand surgery   - monitor off abx              # Occlusive Left subclavian thrombosis   - LUE venous duplex : occlusive thrombus in the left mid subclavian vein with partial slow flow detected in the lateral subclavian vein.  - LUE arterial Duplex neg   - cont w/ Eliquis     # DM2   - A1c 8.8%  - cont w/ Lantus   - cont w/ FS monitoring w/ insulin s/s coverage     # HTN, Cad s/p PCI w/ stent/ CABG and HPL.  - cont with ASA, Metoprolol and Atorvastatin     # Anemia of Chronic Disease  - h/h stable, will cont  to monitor   - Hb 10.9, stable.    # Stage 3-4 sacral decubiti wound  - s/p debridement 12/19  -cont w/ wound care as recommended     # Dysphagia/ Moderate Protein Calorie Malnutrition  - Peg tube in place   - continue with tube feeding at goal as tolerated.     # Constipation  - cont w/ senna, Miralax prn     DVT prophylaxis: Eliquis.    Disposition: Patient has no insurance and Family in process of getting guardianship, court date 3/12.

## 2023-03-01 LAB
GLUCOSE BLDC GLUCOMTR-MCNC: 131 MG/DL — HIGH (ref 70–99)
GLUCOSE BLDC GLUCOMTR-MCNC: 147 MG/DL — HIGH (ref 70–99)
GLUCOSE BLDC GLUCOMTR-MCNC: 222 MG/DL — HIGH (ref 70–99)
GLUCOSE BLDC GLUCOMTR-MCNC: 223 MG/DL — HIGH (ref 70–99)

## 2023-03-01 PROCEDURE — 99232 SBSQ HOSP IP/OBS MODERATE 35: CPT

## 2023-03-01 RX ORDER — MULTIVIT-MIN/FERROUS GLUCONATE 9 MG/15 ML
15 LIQUID (ML) ORAL DAILY
Refills: 0 | Status: DISCONTINUED | OUTPATIENT
Start: 2023-03-01 | End: 2023-03-18

## 2023-03-01 RX ADMIN — Medication 1 MILLIGRAM(S): at 11:55

## 2023-03-01 RX ADMIN — Medication 15 MILLILITER(S): at 17:06

## 2023-03-01 RX ADMIN — Medication 1000 MILLIGRAM(S): at 21:34

## 2023-03-01 RX ADMIN — Medication 1 APPLICATION(S): at 17:26

## 2023-03-01 RX ADMIN — Medication 81 MILLIGRAM(S): at 11:55

## 2023-03-01 RX ADMIN — Medication 25 MILLIGRAM(S): at 05:05

## 2023-03-01 RX ADMIN — LEVETIRACETAM 1500 MILLIGRAM(S): 250 TABLET, FILM COATED ORAL at 05:05

## 2023-03-01 RX ADMIN — ATORVASTATIN CALCIUM 20 MILLIGRAM(S): 80 TABLET, FILM COATED ORAL at 21:32

## 2023-03-01 RX ADMIN — ROBINUL 1 MILLIGRAM(S): 0.2 INJECTION INTRAMUSCULAR; INTRAVENOUS at 17:06

## 2023-03-01 RX ADMIN — CHLORHEXIDINE GLUCONATE 1 APPLICATION(S): 213 SOLUTION TOPICAL at 05:08

## 2023-03-01 RX ADMIN — BUDESONIDE AND FORMOTEROL FUMARATE DIHYDRATE 2 PUFF(S): 160; 4.5 AEROSOL RESPIRATORY (INHALATION) at 05:07

## 2023-03-01 RX ADMIN — SCOPALAMINE 1 PATCH: 1 PATCH, EXTENDED RELEASE TRANSDERMAL at 23:20

## 2023-03-01 RX ADMIN — PANTOPRAZOLE SODIUM 40 MILLIGRAM(S): 20 TABLET, DELAYED RELEASE ORAL at 05:19

## 2023-03-01 RX ADMIN — INSULIN GLARGINE 10 UNIT(S): 100 INJECTION, SOLUTION SUBCUTANEOUS at 21:33

## 2023-03-01 RX ADMIN — Medication 1 APPLICATION(S): at 05:21

## 2023-03-01 RX ADMIN — Medication 2: at 11:56

## 2023-03-01 RX ADMIN — Medication 750 MILLIGRAM(S): at 07:51

## 2023-03-01 RX ADMIN — LEVETIRACETAM 1500 MILLIGRAM(S): 250 TABLET, FILM COATED ORAL at 17:06

## 2023-03-01 RX ADMIN — APIXABAN 5 MILLIGRAM(S): 2.5 TABLET, FILM COATED ORAL at 05:06

## 2023-03-01 RX ADMIN — BUDESONIDE AND FORMOTEROL FUMARATE DIHYDRATE 2 PUFF(S): 160; 4.5 AEROSOL RESPIRATORY (INHALATION) at 17:27

## 2023-03-01 RX ADMIN — ROBINUL 1 MILLIGRAM(S): 0.2 INJECTION INTRAMUSCULAR; INTRAVENOUS at 05:05

## 2023-03-01 RX ADMIN — SCOPALAMINE 1 PATCH: 1 PATCH, EXTENDED RELEASE TRANSDERMAL at 23:00

## 2023-03-01 RX ADMIN — POLYETHYLENE GLYCOL 3350 17 GRAM(S): 17 POWDER, FOR SOLUTION ORAL at 11:55

## 2023-03-01 RX ADMIN — SCOPALAMINE 1 PATCH: 1 PATCH, EXTENDED RELEASE TRANSDERMAL at 17:26

## 2023-03-01 RX ADMIN — APIXABAN 5 MILLIGRAM(S): 2.5 TABLET, FILM COATED ORAL at 17:06

## 2023-03-01 RX ADMIN — PREGABALIN 1000 MICROGRAM(S): 225 CAPSULE ORAL at 11:55

## 2023-03-01 RX ADMIN — Medication 1 APPLICATION(S): at 05:22

## 2023-03-01 NOTE — CHART NOTE - NSCHARTNOTEFT_GEN_A_CORE
Assessment:  Pt seen in 2C unit, trach collar, tolerating small amount of oral feeding, GT feeding c Glucerna 1.2 continued.  Pt's son was @ bedside.  Pt adm c dx of hypoglycemia, cardiac arrest, hypothermia, s/p critical care stay, hospital course complicated by prolonged hypoxemic respiratory failure, acute/chronic respiratory failure, acute metabolic encephalopathy due to severe hypoglycemia, s/p ELMER, shock liver, COPD, s/p trach,  s/p PEG, DVT, multiple infections,  myoclonic seizure, occlusive left subclavian thrombosis,  anemia of chronic disease, functional quadriplegia, sacral decubiti, constipation.      Factors impacting intake: [ ] none [ ] nausea  [ ] vomiting [ ] diarrhea [ x] constipation; 02/27, BM noted  [ ]chewing problems [ ] swallowing issues  [ x] other:  on G-Tube feeding, pt is a lacto-ovo vegetarian    Diet Prescription: Diet, Pureed:   Tube Feeding Modality: Gastrostomy  Glucerna 1.2 Pedrito  Total Volume for 24 Hours (mL): 1440  Continuous  Starting Tube Feed Rate {mL per Hour}: 60  Until Goal Tube Feed Rate (mL per Hour): 60  Tube Feed Duration (in Hours): 24  Tube Feed Start Time: 15:30  Free Water Flush  Pump   Rate (mL per Hour): 30  Liquid Protein Supplement     Qty per Day:  1 (02-27-23 @ 15:25)    Intake: <50% meals, Glucerna 1.2 @ 60 mL/hr x 24 hrs, being held during meals    Current Weight: 02/26; not able to weigh pt  02/13, 112.4 kg(?), 02/08, 89 kg, 02/03, 80.9 kg, 11/14/22, 82.2 kg(adm wt.)  % Weight Change: unable to determine as current wt. is not available    Nutrition focused physical exam conducted; Subcutaneous fat Exam;  [ Moderate   ]  Orbital fat pads region,  [ WNL  ]Buccal fat region,  [ Moderate   ]triceps region, [-  ]ribs region.  Muscle Exam; [ Moderate  ]temples region, [ WNL  ]clavicle region, [ WNL  ]shoulder region, [  -]Scapula region, [ Mild   ]Interosseous region, [ Unable  ]thigh region, [ Unable  ]Calf region    Pertinent Medications: MEDICATIONS  (STANDING):  apixaban 5 milliGRAM(s) Oral every 12 hours  aspirin  chewable 81 milliGRAM(s) Oral daily  atorvastatin 20 milliGRAM(s) Oral at bedtime  bacitracin   Ointment 1 Application(s) Topical two times a day  budesonide 160 MICROgram(s)/formoterol 4.5 MICROgram(s) Inhaler 2 Puff(s) Inhalation two times a day  chlorhexidine 2% Cloths 1 Application(s) Topical <User Schedule>  cyanocobalamin 1000 MICROGram(s) Oral daily  dextrose 5%. 1000 milliLiter(s) (50 mL/Hr) IV Continuous <Continuous>  dextrose 5%. 1000 milliLiter(s) (100 mL/Hr) IV Continuous <Continuous>  dextrose 50% Injectable 25 Gram(s) IV Push once  dextrose 50% Injectable 12.5 Gram(s) IV Push once  dextrose 50% Injectable 25 Gram(s) IV Push once  diphenhydrAMINE Injectable 50 milliGRAM(s) IV Push once  folic acid 1 milliGRAM(s) Oral daily  glucagon  Injectable 1 milliGRAM(s) IntraMuscular once  glycopyrrolate 1 milliGRAM(s) Oral two times a day  insulin glargine Injectable (LANTUS) 10 Unit(s) SubCutaneous at bedtime  insulin lispro (ADMELOG) corrective regimen sliding scale   SubCutaneous every 6 hours  levETIRAcetam  Solution 1500 milliGRAM(s) Oral two times a day  metoprolol tartrate 25 milliGRAM(s) Enteral Tube two times a day  pantoprazole   Suspension 40 milliGRAM(s) Oral before breakfast  polyethylene glycol 3350 17 Gram(s) Oral daily  scopolamine 1 mG/72 Hr(s) Patch 1 Patch Transdermal every 72 hours  senna 2 Tablet(s) Oral at bedtime  valproic  acid Syrup 1000 milliGRAM(s) Oral <User Schedule>  valproic  acid Syrup 750 milliGRAM(s) Oral <User Schedule>  vitamin A &amp; D Ointment 1 Application(s) Topical two times a day    MEDICATIONS  (PRN):  acetaminophen     Tablet .. 650 milliGRAM(s) Oral every 6 hours PRN Temp greater or equal to 38C (100.4F), Mild Pain (1 - 3)  albuterol/ipratropium for Nebulization 3 milliLiter(s) Nebulizer every 6 hours PRN Shortness of Breath and/or Wheezing  aluminum hydroxide/magnesium hydroxide/simethicone Suspension 30 milliLiter(s) Oral every 4 hours PRN Dyspepsia  dextrose Oral Gel 15 Gram(s) Oral once PRN Blood Glucose LESS THAN 70 milliGRAM(s)/deciliter  melatonin 3 milliGRAM(s) Oral at bedtime PRN Insomnia  nitroglycerin     SubLingual 0.4 milliGRAM(s) SubLingual every 5 minutes PRN Chest Pain    Pertinent Labs: 02-27 Na138 mmol/L Glu 118 mg/dL<H> K+ 3.9 mmol/L Cr  0.78 mg/dL BUN 24 mg/dL<H> 02-27 Phos 3.4 mg/dL 02-23 Alb 2.1 g/dL<L>02-23 ALT 15 U/L AST 12 U/L<L> Alkaline Phosphatase 82 U/L   CAPILLARY BLOOD GLUCOSE      POCT Blood Glucose.: 131 mg/dL (01 Mar 2023 05:09)  POCT Blood Glucose.: 164 mg/dL (28 Feb 2023 23:56)  POCT Blood Glucose.: 187 mg/dL (28 Feb 2023 21:00)  POCT Blood Glucose.: 134 mg/dL (28 Feb 2023 16:41)  POCT Blood Glucose.: 196 mg/dL (28 Feb 2023 12:09)    Skin:   03/01, IAD  02/17 & 02/22, skin tear right buttock noted  Pressure injury x 1  1. 02/22; sacrum; stage IV healing     Estimated Needs:   [x ] no change since previous assessment(11/06 & 11/23)  [ ] recalculated:     Previous Nutrition Diagnosis: (12/23)  [x ] Malnutrition; moderate malnutrition in context of acute illness   Related to: increased protein energy needs in setting of cardiac arrest, acute respiratory failure, s/p ELMER, shock liver, pressure ulcers/wounds  As evidenced by: physical findings of mild/moderate fat/muscle depletion   GOAL: pt to meet >75% protein-energy needs via tube feeding; met   Nutrition Diagnosis is [x ] ongoing, improving  [ ] resolved [ ] not applicable       New Nutrition Diagnosis: [ x] not applicable     Interventions:   Recommend  [x ] Change Diet To: puree c thin liquids, Lacto-ovo vegetarian, consistent carbohydrate c evening snack, low sodium   [ x] Nutrition Supplement;  Glucerna Shake 2 x daily= 440 calories, 20 grams protein daily   [ x] Nutrition Support; Glucerna 1.2 @ 60 ml/hr from 7pm-7am(12 hours) via G-Tube=720 ml, 864 calories, 43 grams protein, 580 ml free water   [x ] Other: + Liquid protein supplement 1 pkg=15 grams protein, 100 calories via G-Tube  [x] multivitamin c minerals daily via G-Tube    Monitoring and Evaluation:   [x ] PO intake [ x ] Tolerance to diet prescription [ x ] weights [ x ] labs[ x ] follow up per protocol  [ ] other: Assessment:  Pt seen in 2C unit, trach collar, tolerating small amount of oral feeding, GT feeding c Glucerna 1.2 continued.  Pt's son was @ bedside.  Pt adm c dx of hypoglycemia, cardiac arrest, hypothermia, s/p critical care stay, hospital course complicated by prolonged hypoxemic respiratory failure, acute/chronic respiratory failure, acute metabolic encephalopathy due to severe hypoglycemia, s/p ELMER, shock liver, COPD, s/p trach,  s/p PEG, DVT, multiple infections,  myoclonic seizure, occlusive left subclavian thrombosis,  anemia of chronic disease, functional quadriplegia, sacral decubiti, constipation.      Factors impacting intake: [ ] none [ ] nausea  [ ] vomiting [ ] diarrhea [ x] constipation; 02/27, BM noted  [ ]chewing problems [ x] swallowing issues; 02/16, VFSS/MBS swallow evaluation c recommendation for puree thin liquids  [ x] other:  on G-Tube feeding, pt is a lacto-ovo vegetarian    Diet Prescription: Diet, Pureed:   Tube Feeding Modality: Gastrostomy  Glucerna 1.2 Pedrito  Total Volume for 24 Hours (mL): 1440  Continuous  Starting Tube Feed Rate {mL per Hour}: 60  Until Goal Tube Feed Rate (mL per Hour): 60  Tube Feed Duration (in Hours): 24  Tube Feed Start Time: 15:30  Free Water Flush  Pump   Rate (mL per Hour): 30  Liquid Protein Supplement     Qty per Day:  1 (02-27-23 @ 15:25)    Intake: <50% meals, Glucerna 1.2 @ 60 mL/hr x 24 hrs, being held during meals    Current Weight: 02/26; not able to weigh pt  02/13, 112.4 kg(?), 02/08, 89 kg, 02/03, 80.9 kg, 11/14/22, 82.2 kg(adm wt.)  % Weight Change: unable to determine as current wt. is not available    Nutrition focused physical exam conducted; Subcutaneous fat Exam;  [ Moderate   ]  Orbital fat pads region,  [ WNL  ]Buccal fat region,  [ Moderate   ]triceps region, [-  ]ribs region.  Muscle Exam; [ Moderate  ]temples region, [ WNL  ]clavicle region, [ WNL  ]shoulder region, [  -]Scapula region, [ Mild   ]Interosseous region, [ Unable  ]thigh region, [ Unable  ]Calf region    Pertinent Medications: MEDICATIONS  (STANDING):  apixaban 5 milliGRAM(s) Oral every 12 hours  aspirin  chewable 81 milliGRAM(s) Oral daily  atorvastatin 20 milliGRAM(s) Oral at bedtime  bacitracin   Ointment 1 Application(s) Topical two times a day  budesonide 160 MICROgram(s)/formoterol 4.5 MICROgram(s) Inhaler 2 Puff(s) Inhalation two times a day  chlorhexidine 2% Cloths 1 Application(s) Topical <User Schedule>  cyanocobalamin 1000 MICROGram(s) Oral daily  dextrose 5%. 1000 milliLiter(s) (50 mL/Hr) IV Continuous <Continuous>  dextrose 5%. 1000 milliLiter(s) (100 mL/Hr) IV Continuous <Continuous>  dextrose 50% Injectable 25 Gram(s) IV Push once  dextrose 50% Injectable 12.5 Gram(s) IV Push once  dextrose 50% Injectable 25 Gram(s) IV Push once  diphenhydrAMINE Injectable 50 milliGRAM(s) IV Push once  folic acid 1 milliGRAM(s) Oral daily  glucagon  Injectable 1 milliGRAM(s) IntraMuscular once  glycopyrrolate 1 milliGRAM(s) Oral two times a day  insulin glargine Injectable (LANTUS) 10 Unit(s) SubCutaneous at bedtime  insulin lispro (ADMELOG) corrective regimen sliding scale   SubCutaneous every 6 hours  levETIRAcetam  Solution 1500 milliGRAM(s) Oral two times a day  metoprolol tartrate 25 milliGRAM(s) Enteral Tube two times a day  pantoprazole   Suspension 40 milliGRAM(s) Oral before breakfast  polyethylene glycol 3350 17 Gram(s) Oral daily  scopolamine 1 mG/72 Hr(s) Patch 1 Patch Transdermal every 72 hours  senna 2 Tablet(s) Oral at bedtime  valproic  acid Syrup 1000 milliGRAM(s) Oral <User Schedule>  valproic  acid Syrup 750 milliGRAM(s) Oral <User Schedule>  vitamin A &amp; D Ointment 1 Application(s) Topical two times a day    MEDICATIONS  (PRN):  acetaminophen     Tablet .. 650 milliGRAM(s) Oral every 6 hours PRN Temp greater or equal to 38C (100.4F), Mild Pain (1 - 3)  albuterol/ipratropium for Nebulization 3 milliLiter(s) Nebulizer every 6 hours PRN Shortness of Breath and/or Wheezing  aluminum hydroxide/magnesium hydroxide/simethicone Suspension 30 milliLiter(s) Oral every 4 hours PRN Dyspepsia  dextrose Oral Gel 15 Gram(s) Oral once PRN Blood Glucose LESS THAN 70 milliGRAM(s)/deciliter  melatonin 3 milliGRAM(s) Oral at bedtime PRN Insomnia  nitroglycerin     SubLingual 0.4 milliGRAM(s) SubLingual every 5 minutes PRN Chest Pain    Pertinent Labs: 02-27 Na138 mmol/L Glu 118 mg/dL<H> K+ 3.9 mmol/L Cr  0.78 mg/dL BUN 24 mg/dL<H> 02-27 Phos 3.4 mg/dL 02-23 Alb 2.1 g/dL<L>02-23 ALT 15 U/L AST 12 U/L<L> Alkaline Phosphatase 82 U/L   CAPILLARY BLOOD GLUCOSE      POCT Blood Glucose.: 131 mg/dL (01 Mar 2023 05:09)  POCT Blood Glucose.: 164 mg/dL (28 Feb 2023 23:56)  POCT Blood Glucose.: 187 mg/dL (28 Feb 2023 21:00)  POCT Blood Glucose.: 134 mg/dL (28 Feb 2023 16:41)  POCT Blood Glucose.: 196 mg/dL (28 Feb 2023 12:09)    Skin:   03/01, IAD  02/17 & 02/22, skin tear right buttock noted  Pressure injury x 1  1. 02/22; sacrum; stage IV healing     Estimated Needs:   [x ] no change since previous assessment(11/06 & 11/23)  [ ] recalculated:     Previous Nutrition Diagnosis: (12/23)  [x ] Malnutrition; moderate malnutrition in context of acute illness   Related to: increased protein energy needs in setting of cardiac arrest, acute respiratory failure, s/p ELMER, shock liver, pressure ulcers/wounds  As evidenced by: physical findings of mild/moderate fat/muscle depletion   GOAL: pt to meet >75% protein-energy needs via tube feeding; met   Nutrition Diagnosis is [x ] ongoing, improving  [ ] resolved [ ] not applicable       New Nutrition Diagnosis: [ x] not applicable     Interventions:   Recommend  [x ] Change Diet To: puree c thin liquids, Lacto-ovo vegetarian, consistent carbohydrate c evening snack, low sodium   [ x] Nutrition Supplement;  Glucerna Shake 2 x daily= 440 calories, 20 grams protein daily + Liquid protein supplement 1 pkg=15 grams protein, 100 calories   [ x] Nutrition Support; Glucerna 1.2 @ 60 ml/hr from 7pm-7am(12 hours) via G-Tube=720 ml, 864 calories, 43 grams protein, 580 ml free water   [x] multivitamin c minerals daily via G-Tube    Monitoring and Evaluation:   [x ] PO intake [ x ] Tolerance to diet prescription [ x ] weights [ x ] labs[ x ] follow up per protocol  [ ] other: Assessment:  Pt seen in 2C unit, trach collar, tolerating small amount of oral feeding, GT feeding c Glucerna 1.2 continued.  Pt's son was @ bedside.  Pt adm c dx of hypoglycemia, cardiac arrest, hypothermia, s/p critical care stay, hospital course complicated by prolonged hypoxemic respiratory failure, acute/chronic respiratory failure, acute metabolic encephalopathy due to severe hypoglycemia, s/p ELMER, shock liver, COPD, s/p trach,  s/p PEG, DVT, multiple infections,  myoclonic seizure, occlusive left subclavian thrombosis,  anemia of chronic disease, functional quadriplegia, sacral decubiti, constipation.      Factors impacting intake: [ ] none [ ] nausea  [ ] vomiting [ ] diarrhea [ x] constipation; 02/27, BM noted  [ ]chewing problems [ x] swallowing issues; 02/16, VFSS/MBS swallow evaluation c recommendation for puree thin liquids  [ x] other:  on G-Tube feeding, pt is a lacto-ovo vegetarian    Diet Prescription: Diet, Pureed:   Tube Feeding Modality: Gastrostomy  Glucerna 1.2 Pedrito  Total Volume for 24 Hours (mL): 1440  Continuous  Starting Tube Feed Rate {mL per Hour}: 60  Until Goal Tube Feed Rate (mL per Hour): 60  Tube Feed Duration (in Hours): 24  Tube Feed Start Time: 15:30  Free Water Flush  Pump   Rate (mL per Hour): 30  Liquid Protein Supplement     Qty per Day:  1 (02-27-23 @ 15:25)    Intake: <50% meals, Glucerna 1.2 @ 60 mL/hr x 24 hrs, being held during meals    Current Weight: 02/26; not able to weigh pt  02/13, 112.4 kg(?), 02/08, 89 kg, 02/03, 80.9 kg, 11/14/22, 82.2 kg(adm wt.)  % Weight Change: unable to determine as current wt. is not available    Nutrition focused physical exam conducted; Subcutaneous fat Exam;  [ Moderate   ]  Orbital fat pads region,  [ WNL  ]Buccal fat region,  [ Moderate   ]triceps region, [-  ]ribs region.  Muscle Exam; [ Moderate  ]temples region, [ WNL  ]clavicle region, [ WNL  ]shoulder region, [  -]Scapula region, [ Mild   ]Interosseous region, [ Unable  ]thigh region, [ Unable  ]Calf region    Pertinent Medications: MEDICATIONS  (STANDING):  apixaban 5 milliGRAM(s) Oral every 12 hours  aspirin  chewable 81 milliGRAM(s) Oral daily  atorvastatin 20 milliGRAM(s) Oral at bedtime  bacitracin   Ointment 1 Application(s) Topical two times a day  budesonide 160 MICROgram(s)/formoterol 4.5 MICROgram(s) Inhaler 2 Puff(s) Inhalation two times a day  chlorhexidine 2% Cloths 1 Application(s) Topical <User Schedule>  cyanocobalamin 1000 MICROGram(s) Oral daily  dextrose 5%. 1000 milliLiter(s) (50 mL/Hr) IV Continuous <Continuous>  dextrose 5%. 1000 milliLiter(s) (100 mL/Hr) IV Continuous <Continuous>  dextrose 50% Injectable 25 Gram(s) IV Push once  dextrose 50% Injectable 12.5 Gram(s) IV Push once  dextrose 50% Injectable 25 Gram(s) IV Push once  diphenhydrAMINE Injectable 50 milliGRAM(s) IV Push once  folic acid 1 milliGRAM(s) Oral daily  glucagon  Injectable 1 milliGRAM(s) IntraMuscular once  glycopyrrolate 1 milliGRAM(s) Oral two times a day  insulin glargine Injectable (LANTUS) 10 Unit(s) SubCutaneous at bedtime  insulin lispro (ADMELOG) corrective regimen sliding scale   SubCutaneous every 6 hours  levETIRAcetam  Solution 1500 milliGRAM(s) Oral two times a day  metoprolol tartrate 25 milliGRAM(s) Enteral Tube two times a day  pantoprazole   Suspension 40 milliGRAM(s) Oral before breakfast  polyethylene glycol 3350 17 Gram(s) Oral daily  scopolamine 1 mG/72 Hr(s) Patch 1 Patch Transdermal every 72 hours  senna 2 Tablet(s) Oral at bedtime  valproic  acid Syrup 1000 milliGRAM(s) Oral <User Schedule>  valproic  acid Syrup 750 milliGRAM(s) Oral <User Schedule>  vitamin A &amp; D Ointment 1 Application(s) Topical two times a day    MEDICATIONS  (PRN):  acetaminophen     Tablet .. 650 milliGRAM(s) Oral every 6 hours PRN Temp greater or equal to 38C (100.4F), Mild Pain (1 - 3)  albuterol/ipratropium for Nebulization 3 milliLiter(s) Nebulizer every 6 hours PRN Shortness of Breath and/or Wheezing  aluminum hydroxide/magnesium hydroxide/simethicone Suspension 30 milliLiter(s) Oral every 4 hours PRN Dyspepsia  dextrose Oral Gel 15 Gram(s) Oral once PRN Blood Glucose LESS THAN 70 milliGRAM(s)/deciliter  melatonin 3 milliGRAM(s) Oral at bedtime PRN Insomnia  nitroglycerin     SubLingual 0.4 milliGRAM(s) SubLingual every 5 minutes PRN Chest Pain    Pertinent Labs: 02-27 Na138 mmol/L Glu 118 mg/dL<H> K+ 3.9 mmol/L Cr  0.78 mg/dL BUN 24 mg/dL<H> 02-27 Phos 3.4 mg/dL 02-23 Alb 2.1 g/dL<L>02-23 ALT 15 U/L AST 12 U/L<L> Alkaline Phosphatase 82 U/L   CAPILLARY BLOOD GLUCOSE      POCT Blood Glucose.: 131 mg/dL (01 Mar 2023 05:09)  POCT Blood Glucose.: 164 mg/dL (28 Feb 2023 23:56)  POCT Blood Glucose.: 187 mg/dL (28 Feb 2023 21:00)  POCT Blood Glucose.: 134 mg/dL (28 Feb 2023 16:41)  POCT Blood Glucose.: 196 mg/dL (28 Feb 2023 12:09)    Skin:   03/01, IAD  02/17 & 02/22, skin tear right buttock noted  Pressure injury x 1  1. 02/22; sacrum; stage IV healing     Estimated Needs:   [x ] no change since previous assessment(11/06 & 11/23)  [ ] recalculated:     Previous Nutrition Diagnosis: (12/23)  [x ] Malnutrition; moderate malnutrition in context of acute illness   Related to: increased protein energy needs in setting of cardiac arrest, acute respiratory failure, s/p ELMER, shock liver, pressure ulcers/wounds  As evidenced by: physical findings of mild/moderate fat/muscle depletion   GOAL: pt to meet >75% protein-energy needs via tube feeding; met   Nutrition Diagnosis is [x ] ongoing, improving  [ ] resolved [ ] not applicable       New Nutrition Diagnosis: [ x] not applicable     Interventions:   Recommend  [x ] Change Diet To: puree c thin liquids, Lacto-ovo vegetarian, consistent carbohydrate c evening snack, low sodium   [ x] Nutrition Supplement;  Glucerna Shake 2 x daily= 440 calories, 20 grams protein daily + Liquid protein supplement 1 pkg=15 grams protein, 100 calories   [ x] Nutrition Support; recommend nocturnal feeding; Glucerna 1.2 @ 60 ml/hr from 7pm-7am(12 hours) via G-Tube=720 ml, 864 calories, 43 grams protein, 580 ml free water   [x] multivitamin c minerals daily via G-Tube    Monitoring and Evaluation:   [x ] PO intake [ x ] Tolerance to diet prescription [ x ] weights [ x ] labs[ x ] follow up per protocol  [ ] other:

## 2023-03-01 NOTE — PROGRESS NOTE ADULT - SUBJECTIVE AND OBJECTIVE BOX
BRANDON CAMARILLO    [facility] [address]    Allergies    No Known Allergies    Intolerances        PAST MEDICAL & SURGICAL HISTORY:  HTN (hypertension)      HLD (hyperlipidemia)      Diabetes mellitus      Lacunar infarction      BPH (benign prostatic hyperplasia)      CHF (congestive heart failure)      Chronic obstructive pulmonary disease, unspecified COPD type      S/P CABG (coronary artery bypass graft)  2014          FAMILY HISTORY:      Home Medications:  aspirin 81 mg oral delayed release tablet: 1 tab(s) orally once a day (12 Jun 2020 17:35)  Liquifilm Tears preserved ophthalmic solution: 1 drop(s) to each affected eye 2 times a day (12 Jun 2020 17:35)      MEDICATIONS  (STANDING):  apixaban 5 milliGRAM(s) Oral every 12 hours  aspirin  chewable 81 milliGRAM(s) Oral daily  atorvastatin 20 milliGRAM(s) Oral at bedtime  bacitracin   Ointment 1 Application(s) Topical two times a day  budesonide 160 MICROgram(s)/formoterol 4.5 MICROgram(s) Inhaler 2 Puff(s) Inhalation two times a day  chlorhexidine 2% Cloths 1 Application(s) Topical <User Schedule>  cyanocobalamin 1000 MICROGram(s) Oral daily  dextrose 5%. 1000 milliLiter(s) (100 mL/Hr) IV Continuous <Continuous>  dextrose 5%. 1000 milliLiter(s) (50 mL/Hr) IV Continuous <Continuous>  dextrose 50% Injectable 25 Gram(s) IV Push once  dextrose 50% Injectable 12.5 Gram(s) IV Push once  dextrose 50% Injectable 25 Gram(s) IV Push once  diphenhydrAMINE Injectable 50 milliGRAM(s) IV Push once  folic acid 1 milliGRAM(s) Oral daily  glucagon  Injectable 1 milliGRAM(s) IntraMuscular once  glycopyrrolate 1 milliGRAM(s) Oral two times a day  insulin glargine Injectable (LANTUS) 10 Unit(s) SubCutaneous at bedtime  insulin lispro (ADMELOG) corrective regimen sliding scale   SubCutaneous every 6 hours  levETIRAcetam  Solution 1500 milliGRAM(s) Oral two times a day  metoprolol tartrate 25 milliGRAM(s) Enteral Tube two times a day  pantoprazole   Suspension 40 milliGRAM(s) Oral before breakfast  polyethylene glycol 3350 17 Gram(s) Oral daily  scopolamine 1 mG/72 Hr(s) Patch 1 Patch Transdermal every 72 hours  senna 2 Tablet(s) Oral at bedtime  valproic  acid Syrup 1000 milliGRAM(s) Oral <User Schedule>  valproic  acid Syrup 750 milliGRAM(s) Oral <User Schedule>  vitamin A &amp; D Ointment 1 Application(s) Topical two times a day    MEDICATIONS  (PRN):  acetaminophen     Tablet .. 650 milliGRAM(s) Oral every 6 hours PRN Temp greater or equal to 38C (100.4F), Mild Pain (1 - 3)  albuterol/ipratropium for Nebulization 3 milliLiter(s) Nebulizer every 6 hours PRN Shortness of Breath and/or Wheezing  aluminum hydroxide/magnesium hydroxide/simethicone Suspension 30 milliLiter(s) Oral every 4 hours PRN Dyspepsia  dextrose Oral Gel 15 Gram(s) Oral once PRN Blood Glucose LESS THAN 70 milliGRAM(s)/deciliter  melatonin 3 milliGRAM(s) Oral at bedtime PRN Insomnia  nitroglycerin     SubLingual 0.4 milliGRAM(s) SubLingual every 5 minutes PRN Chest Pain      Diet, Pureed:   Tube Feeding Modality: Gastrostomy  Glucerna 1.2 Pedrito  Total Volume for 24 Hours (mL): 1440  Continuous  Starting Tube Feed Rate mL per Hour: 60  Until Goal Tube Feed Rate (mL per Hour): 60  Tube Feed Duration (in Hours): 24  Tube Feed Start Time: 15:30  Free Water Flush  Pump   Rate (mL per Hour): 30  Liquid Protein Supplement     Qty per Day:  1 (02-27-23 @ 15:25) [Active]          Vital Signs Last 24 Hrs  T(C): 37.1 (01 Mar 2023 04:41), Max: 37.3 (28 Feb 2023 23:45)  T(F): 98.8 (01 Mar 2023 04:41), Max: 99.2 (28 Feb 2023 23:45)  HR: 89 (01 Mar 2023 06:40) (66 - 89)  BP: 130/79 (01 Mar 2023 04:41) (110/67 - 130/79)  BP(mean): --  RR: 16 (01 Mar 2023 06:40) (16 - 19)  SpO2: 98% (01 Mar 2023 06:40) (93% - 99%)    Parameters below as of 01 Mar 2023 06:40  Patient On (Oxygen Delivery Method): tracheostomy collar  O2 Flow (L/min): 6  O2 Concentration (%): 28      02-28-23 @ 07:01  -  03-01-23 @ 07:00  --------------------------------------------------------  IN: 120 mL / OUT: 200 mL / NET: -80 mL              LABS:                    WBC:  WBC Count: 7.99 K/uL (02-27 @ 07:12)      MICROBIOLOGY:  RECENT CULTURES:                  Sodium:  Sodium, Serum: 138 mmol/L (02-27 @ 07:12)      0.78 mg/dL 02-27 @ 07:12      Hemoglobin:  Hemoglobin: 11.1 g/dL (02-27 @ 07:12)      Platelets: Platelet Count - Automated: 215 K/uL (02-27 @ 07:12)              RADIOLOGY & ADDITIONAL STUDIES:      MICROBIOLOGY:  RECENT CULTURES:

## 2023-03-01 NOTE — PROGRESS NOTE ADULT - SUBJECTIVE AND OBJECTIVE BOX
Patient: BRANDON CAMARILLO [id number] 72y Male                            Hospitalist Attending Note    No complaints.     ____________________PHYSICAL EXAM:  GENERAL:  NAD, alert, interactive.    NECK: Trach in place.    HEENT: NCAT  CARDIOVASCULAR:  S1, S2  LUNGS: CTAB  ABDOMEN:  soft, (-) tenderness, (-) distension, (+) bowel sounds, (-) guarding, (-) rebound (-) rigidity  EXTREMITIES:  no cyanosis / clubbing / edema.   BACK: Stage II Sacral / Coccygeal Decubitus Ulcer measuring ~ 2x2cm, clean base.   ____________________     VITALS:  Vital Signs Last 24 Hrs  T(C): 36.8 (01 Mar 2023 16:15), Max: 37.3 (28 Feb 2023 23:45)  T(F): 98.2 (01 Mar 2023 16:15), Max: 99.2 (28 Feb 2023 23:45)  HR: 76 (01 Mar 2023 17:00) (70 - 89)  BP: 114/70 (01 Mar 2023 16:15) (110/68 - 130/79)  BP(mean): --  RR: 18 (01 Mar 2023 17:00) (16 - 19)  SpO2: 98% (01 Mar 2023 17:00) (96% - 100%)    Parameters below as of 01 Mar 2023 17:00  Patient On (Oxygen Delivery Method): room air     Daily     Daily   CAPILLARY BLOOD GLUCOSE      POCT Blood Glucose.: 147 mg/dL (01 Mar 2023 16:28)  POCT Blood Glucose.: 223 mg/dL (01 Mar 2023 11:17)  POCT Blood Glucose.: 131 mg/dL (01 Mar 2023 05:09)  POCT Blood Glucose.: 164 mg/dL (28 Feb 2023 23:56)  POCT Blood Glucose.: 187 mg/dL (28 Feb 2023 21:00)    I&O's Summary    28 Feb 2023 07:01  -  01 Mar 2023 07:00  --------------------------------------------------------  IN: 120 mL / OUT: 200 mL / NET: -80 mL    01 Mar 2023 07:01  -  01 Mar 2023 19:25  --------------------------------------------------------  IN: 120 mL / OUT: 150 mL / NET: -30 mL        HISTORY:  PAST MEDICAL & SURGICAL HISTORY:  HTN (hypertension)      HLD (hyperlipidemia)      Diabetes mellitus      Lacunar infarction      BPH (benign prostatic hyperplasia)      CHF (congestive heart failure)      Chronic obstructive pulmonary disease, unspecified COPD type      S/P CABG (coronary artery bypass graft)  2014      Allergies    No Known Allergies    Intolerances       LABS:                        MEDICATIONS:  MEDICATIONS  (STANDING):  apixaban 5 milliGRAM(s) Oral every 12 hours  aspirin  chewable 81 milliGRAM(s) Oral daily  atorvastatin 20 milliGRAM(s) Oral at bedtime  bacitracin   Ointment 1 Application(s) Topical two times a day  budesonide 160 MICROgram(s)/formoterol 4.5 MICROgram(s) Inhaler 2 Puff(s) Inhalation two times a day  chlorhexidine 2% Cloths 1 Application(s) Topical <User Schedule>  cyanocobalamin 1000 MICROGram(s) Oral daily  dextrose 5%. 1000 milliLiter(s) (100 mL/Hr) IV Continuous <Continuous>  dextrose 5%. 1000 milliLiter(s) (50 mL/Hr) IV Continuous <Continuous>  dextrose 50% Injectable 25 Gram(s) IV Push once  dextrose 50% Injectable 12.5 Gram(s) IV Push once  dextrose 50% Injectable 25 Gram(s) IV Push once  diphenhydrAMINE Injectable 50 milliGRAM(s) IV Push once  folic acid 1 milliGRAM(s) Oral daily  glucagon  Injectable 1 milliGRAM(s) IntraMuscular once  glycopyrrolate 1 milliGRAM(s) Oral two times a day  insulin glargine Injectable (LANTUS) 10 Unit(s) SubCutaneous at bedtime  insulin lispro (ADMELOG) corrective regimen sliding scale   SubCutaneous every 6 hours  levETIRAcetam  Solution 1500 milliGRAM(s) Oral two times a day  metoprolol tartrate 25 milliGRAM(s) Enteral Tube two times a day  multivitamin/minerals/iron Oral Solution (CENTRUM) 15 milliLiter(s) Enteral Tube daily  pantoprazole   Suspension 40 milliGRAM(s) Oral before breakfast  polyethylene glycol 3350 17 Gram(s) Oral daily  scopolamine 1 mG/72 Hr(s) Patch 1 Patch Transdermal every 72 hours  senna 2 Tablet(s) Oral at bedtime  valproic  acid Syrup 1000 milliGRAM(s) Oral <User Schedule>  valproic  acid Syrup 750 milliGRAM(s) Oral <User Schedule>  vitamin A &amp; D Ointment 1 Application(s) Topical two times a day    MEDICATIONS  (PRN):  acetaminophen     Tablet .. 650 milliGRAM(s) Oral every 6 hours PRN Temp greater or equal to 38C (100.4F), Mild Pain (1 - 3)  albuterol/ipratropium for Nebulization 3 milliLiter(s) Nebulizer every 6 hours PRN Shortness of Breath and/or Wheezing  aluminum hydroxide/magnesium hydroxide/simethicone Suspension 30 milliLiter(s) Oral every 4 hours PRN Dyspepsia  dextrose Oral Gel 15 Gram(s) Oral once PRN Blood Glucose LESS THAN 70 milliGRAM(s)/deciliter  melatonin 3 milliGRAM(s) Oral at bedtime PRN Insomnia  nitroglycerin     SubLingual 0.4 milliGRAM(s) SubLingual every 5 minutes PRN Chest Pain

## 2023-03-01 NOTE — PROGRESS NOTE ADULT - ASSESSMENT
REVIEW OF SYMPTOMS      Able to give (reliable) ROS  NO     PHYSICAL EXAM    HEENT Unremarkable  atraumatic   RESP Fair air entry EXP prolonged    Harsh breath sound Resp distres mild   CARDIAC S1 S2 No S3     NO JVD    ABDOMEN SOFT BS PRESENT NOT DISTENDED No hepatosplenomegaly   PEDAL EDEMA present No calf tenderness  NO rash       GENERAL DATA .   GOC.  12/11/2022 full code       ALLGY.  nka                            WT. ..  12/2/2022 86  BMI. ..    12/2/2022 29                          ICU STAY.  .. 11/29-12/9  COVID.   .. 12/2/2022 scv2 (-)   .. 11/20/2022 scv2 (-)   BEST PRACTICE ISSUES.    HOB ELEVATN. Yes  DVT PPLX.    .. 1/3/2023 apixa 5.2 (vte)    (DVT 12/12 l Subclav throm)  ALEJO PPLX.   INFN PPLX.   .. 11/14 chlorhex 2%   SP SW ANTWAN.         DIET.    ..  12/15 glucerna 1.2 1440 gt   IV fl.    PROCEDURES.  .. 2/15/2023 pmvaslve placd   .. 1/28  size 4 fenestrated cuffed trach placed by surgery     ABGS.  1/6/2023 ac 16/450/.3/5 746/49/123     VS/ PO/IO/ VENT/ DRIPS.  3/1/2023 afeb 80 110/70       PATIENT PRESENTATION.  72 m doa 11/14/2022 cac    PMH  PMH CAD s/p PCI with stent 2015 and CABG 2014,   PMH  s/p medtronic PPM,   PMH CVA (lacunar infarct)    HOSPITAL COURSE   .. 1) PEA arrest with ROSC after approximately 5 min 11/14  Now communicative   .. 2) DVT 12/12 l Subclav thromS 12/15/2022 lvnx 80.2 1/3 changed to apixaban 5.2  .. 3) TRACH 12/5/2022 trach  .. 4) PEG12/5/2022 peg   .. 5) Cellulitis hand absc 12/13 mod enterococcus fecalis  staph epi 12/12-12/15/2022  cephalexin 12/15 vanco once  12/16-12/19 zosyn  .. 6) Vent weaning       PROBLEM ASSESSMENT RECOMMENDATIONS.  Trach 12/5   .. trach care   Trach change  ..  1/28 size 4 fenestrated cuffed trach  Trach wean.  .. 2/6/2023 pt still has lot of secretions  .. 2/27/2023 dw nurs Pt still needing lot of suctioning   .. 2/28/2023 needed lesser suctioning   VTE  .. On apixaba  INFECTION.  .. w 1/17-1/18-1/20-1/21-2/3-2/8-2/10-2/15-2/27/2023      w 8.3- 7.9 - 6.8 - 7.2 - 8.8  - 10- 10.8- 10.8-7.9    .. pr 1/17-1/20/2023 (-)  (-)   .. 1/16-1/20/2023 Rocephin started by hospitalist dced   CAD.  .. 11/14 asa 81   .. 12/13 metoprolol 25.2   CHF   .. Cr 2/6/2023 Cr .7   .. 11/14/2022 echo mod decr segmental lvsf apical lateral apiccal ant segment are abn takotsubo cmpthy pasp 42 .  .. Monitor for chf has chr hfref per echo   COPD   .. 2/1 duoneb p   .. 12/30 symbicort 160   .. 1/7/2023 glycopyrrolate 1.2   .. 1/9/2023 ipratropium  .. 1/15/2023 scopolamine   .. continue bd ics for copd   Anemia.  .. Hb 1/12-1/14-1/19-1/20-7/21-1/25-2/3-2/8-2/10-2/15-2/27/2023       Hb 9.8- 10 -9.1- 9.8  - 9.2 - 9.5 -10- 10 - 10.5 -10.8- 11   .. target hb 7 (+)  .. monitor   sp cac   .. good neuro recovery awake alert interactive   VTE  .. 1/3/2023 apixa 5.2 (vte)    FAMILY COMMUNICATION.  .. 2/12/2023 dw pt son  will ask speech eval for passey cordelia valve  .. 2/12/2023 Pt has a fenestrated trach and has fenestrated inner cannula which is available to place before trying passy cordelia valve     OVERALL   WEANED OFF VENT 1/23   TRACH WEANING    Will consider decannulation when secretions decrease  2/14/2023 dw speech they will try PM valve with fenestrated cannula   2/16/2023 tolerating pm valve  PASSY CORDELIA VALVE   .. 2/15/2023 placed pmv during day time  REHAB 2/16/2023 pt eval requested   FLAILING OF LOWER EXTR   .. Jersks started 1/16   .. 1/19/2023 myoclonic jerks improvd on depakote       TIME SPENT   Over 25 minutes aggregate care time spent on encounter; activities included   direct patient care, counseling and/or coordinating care reviewing notes, lab data/ imaging , discussion with multidisciplinary team/ patient  /family and explaining in detail risks, benefits, alternatives  of the recommendations     BRANDON CAMARILLO

## 2023-03-02 LAB
ANION GAP SERPL CALC-SCNC: 6 MMOL/L — SIGNIFICANT CHANGE UP (ref 5–17)
BUN SERPL-MCNC: 24 MG/DL — HIGH (ref 7–23)
CALCIUM SERPL-MCNC: 8.3 MG/DL — LOW (ref 8.5–10.1)
CHLORIDE SERPL-SCNC: 101 MMOL/L — SIGNIFICANT CHANGE UP (ref 96–108)
CO2 SERPL-SCNC: 33 MMOL/L — HIGH (ref 22–31)
CREAT SERPL-MCNC: 0.76 MG/DL — SIGNIFICANT CHANGE UP (ref 0.5–1.3)
EGFR: 94 ML/MIN/1.73M2 — SIGNIFICANT CHANGE UP
GLUCOSE BLDC GLUCOMTR-MCNC: 119 MG/DL — HIGH (ref 70–99)
GLUCOSE BLDC GLUCOMTR-MCNC: 126 MG/DL — HIGH (ref 70–99)
GLUCOSE BLDC GLUCOMTR-MCNC: 160 MG/DL — HIGH (ref 70–99)
GLUCOSE BLDC GLUCOMTR-MCNC: 162 MG/DL — HIGH (ref 70–99)
GLUCOSE BLDC GLUCOMTR-MCNC: 186 MG/DL — HIGH (ref 70–99)
GLUCOSE BLDC GLUCOMTR-MCNC: 233 MG/DL — HIGH (ref 70–99)
GLUCOSE SERPL-MCNC: 136 MG/DL — HIGH (ref 70–99)
HCT VFR BLD CALC: 33.9 % — LOW (ref 39–50)
HGB BLD-MCNC: 10.8 G/DL — LOW (ref 13–17)
MCHC RBC-ENTMCNC: 29.8 PG — SIGNIFICANT CHANGE UP (ref 27–34)
MCHC RBC-ENTMCNC: 31.9 G/DL — LOW (ref 32–36)
MCV RBC AUTO: 93.6 FL — SIGNIFICANT CHANGE UP (ref 80–100)
NRBC # BLD: 0 /100 WBCS — SIGNIFICANT CHANGE UP (ref 0–0)
PLATELET # BLD AUTO: 201 K/UL — SIGNIFICANT CHANGE UP (ref 150–400)
POTASSIUM SERPL-MCNC: 4.1 MMOL/L — SIGNIFICANT CHANGE UP (ref 3.5–5.3)
POTASSIUM SERPL-SCNC: 4.1 MMOL/L — SIGNIFICANT CHANGE UP (ref 3.5–5.3)
RBC # BLD: 3.62 M/UL — LOW (ref 4.2–5.8)
RBC # FLD: 14.6 % — HIGH (ref 10.3–14.5)
SODIUM SERPL-SCNC: 140 MMOL/L — SIGNIFICANT CHANGE UP (ref 135–145)
WBC # BLD: 8.72 K/UL — SIGNIFICANT CHANGE UP (ref 3.8–10.5)
WBC # FLD AUTO: 8.72 K/UL — SIGNIFICANT CHANGE UP (ref 3.8–10.5)

## 2023-03-02 PROCEDURE — 99232 SBSQ HOSP IP/OBS MODERATE 35: CPT

## 2023-03-02 RX ADMIN — Medication 15 MILLILITER(S): at 11:58

## 2023-03-02 RX ADMIN — ROBINUL 1 MILLIGRAM(S): 0.2 INJECTION INTRAMUSCULAR; INTRAVENOUS at 05:44

## 2023-03-02 RX ADMIN — ATORVASTATIN CALCIUM 20 MILLIGRAM(S): 80 TABLET, FILM COATED ORAL at 21:34

## 2023-03-02 RX ADMIN — Medication 25 MILLIGRAM(S): at 05:44

## 2023-03-02 RX ADMIN — APIXABAN 5 MILLIGRAM(S): 2.5 TABLET, FILM COATED ORAL at 17:19

## 2023-03-02 RX ADMIN — BUDESONIDE AND FORMOTEROL FUMARATE DIHYDRATE 2 PUFF(S): 160; 4.5 AEROSOL RESPIRATORY (INHALATION) at 05:47

## 2023-03-02 RX ADMIN — Medication 2: at 00:11

## 2023-03-02 RX ADMIN — Medication 1 MILLIGRAM(S): at 11:40

## 2023-03-02 RX ADMIN — SCOPALAMINE 1 PATCH: 1 PATCH, EXTENDED RELEASE TRANSDERMAL at 07:21

## 2023-03-02 RX ADMIN — SENNA PLUS 2 TABLET(S): 8.6 TABLET ORAL at 21:34

## 2023-03-02 RX ADMIN — SCOPALAMINE 1 PATCH: 1 PATCH, EXTENDED RELEASE TRANSDERMAL at 19:41

## 2023-03-02 RX ADMIN — APIXABAN 5 MILLIGRAM(S): 2.5 TABLET, FILM COATED ORAL at 05:55

## 2023-03-02 RX ADMIN — Medication 1 APPLICATION(S): at 17:21

## 2023-03-02 RX ADMIN — Medication 25 MILLIGRAM(S): at 17:19

## 2023-03-02 RX ADMIN — BUDESONIDE AND FORMOTEROL FUMARATE DIHYDRATE 2 PUFF(S): 160; 4.5 AEROSOL RESPIRATORY (INHALATION) at 17:20

## 2023-03-02 RX ADMIN — PANTOPRAZOLE SODIUM 40 MILLIGRAM(S): 20 TABLET, DELAYED RELEASE ORAL at 05:47

## 2023-03-02 RX ADMIN — Medication 1 APPLICATION(S): at 05:42

## 2023-03-02 RX ADMIN — Medication 1 APPLICATION(S): at 05:41

## 2023-03-02 RX ADMIN — LEVETIRACETAM 1500 MILLIGRAM(S): 250 TABLET, FILM COATED ORAL at 05:44

## 2023-03-02 RX ADMIN — Medication 1: at 17:19

## 2023-03-02 RX ADMIN — INSULIN GLARGINE 10 UNIT(S): 100 INJECTION, SOLUTION SUBCUTANEOUS at 21:33

## 2023-03-02 RX ADMIN — LEVETIRACETAM 1500 MILLIGRAM(S): 250 TABLET, FILM COATED ORAL at 17:18

## 2023-03-02 RX ADMIN — Medication 1000 MILLIGRAM(S): at 21:35

## 2023-03-02 RX ADMIN — CHLORHEXIDINE GLUCONATE 1 APPLICATION(S): 213 SOLUTION TOPICAL at 05:43

## 2023-03-02 RX ADMIN — Medication 1: at 12:24

## 2023-03-02 RX ADMIN — Medication 81 MILLIGRAM(S): at 11:39

## 2023-03-02 RX ADMIN — ROBINUL 1 MILLIGRAM(S): 0.2 INJECTION INTRAMUSCULAR; INTRAVENOUS at 17:19

## 2023-03-02 RX ADMIN — Medication 750 MILLIGRAM(S): at 10:04

## 2023-03-02 RX ADMIN — PREGABALIN 1000 MICROGRAM(S): 225 CAPSULE ORAL at 11:40

## 2023-03-02 NOTE — PROGRESS NOTE ADULT - SUBJECTIVE AND OBJECTIVE BOX
BRANDON CAMARILLO    [address] [address]    Allergies    No Known Allergies    Intolerances        PAST MEDICAL & SURGICAL HISTORY:  HTN (hypertension)      HLD (hyperlipidemia)      Diabetes mellitus      Lacunar infarction      BPH (benign prostatic hyperplasia)      CHF (congestive heart failure)      Chronic obstructive pulmonary disease, unspecified COPD type      S/P CABG (coronary artery bypass graft)  2014          FAMILY HISTORY:      Home Medications:  aspirin 81 mg oral delayed release tablet: 1 tab(s) orally once a day (12 Jun 2020 17:35)  Liquifilm Tears preserved ophthalmic solution: 1 drop(s) to each affected eye 2 times a day (12 Jun 2020 17:35)      MEDICATIONS  (STANDING):  apixaban 5 milliGRAM(s) Oral every 12 hours  aspirin  chewable 81 milliGRAM(s) Oral daily  atorvastatin 20 milliGRAM(s) Oral at bedtime  bacitracin   Ointment 1 Application(s) Topical two times a day  budesonide 160 MICROgram(s)/formoterol 4.5 MICROgram(s) Inhaler 2 Puff(s) Inhalation two times a day  chlorhexidine 2% Cloths 1 Application(s) Topical <User Schedule>  cyanocobalamin 1000 MICROGram(s) Oral daily  dextrose 5%. 1000 milliLiter(s) (100 mL/Hr) IV Continuous <Continuous>  dextrose 5%. 1000 milliLiter(s) (50 mL/Hr) IV Continuous <Continuous>  dextrose 50% Injectable 25 Gram(s) IV Push once  dextrose 50% Injectable 12.5 Gram(s) IV Push once  dextrose 50% Injectable 25 Gram(s) IV Push once  diphenhydrAMINE Injectable 50 milliGRAM(s) IV Push once  folic acid 1 milliGRAM(s) Oral daily  glucagon  Injectable 1 milliGRAM(s) IntraMuscular once  glycopyrrolate 1 milliGRAM(s) Oral two times a day  insulin glargine Injectable (LANTUS) 10 Unit(s) SubCutaneous at bedtime  insulin lispro (ADMELOG) corrective regimen sliding scale   SubCutaneous every 6 hours  levETIRAcetam  Solution 1500 milliGRAM(s) Oral two times a day  metoprolol tartrate 25 milliGRAM(s) Enteral Tube two times a day  multivitamin/minerals/iron Oral Solution (CENTRUM) 15 milliLiter(s) Enteral Tube daily  pantoprazole   Suspension 40 milliGRAM(s) Oral before breakfast  polyethylene glycol 3350 17 Gram(s) Oral daily  scopolamine 1 mG/72 Hr(s) Patch 1 Patch Transdermal every 72 hours  senna 2 Tablet(s) Oral at bedtime  valproic  acid Syrup 1000 milliGRAM(s) Oral <User Schedule>  valproic  acid Syrup 750 milliGRAM(s) Oral <User Schedule>  vitamin A &amp; D Ointment 1 Application(s) Topical two times a day    MEDICATIONS  (PRN):  acetaminophen     Tablet .. 650 milliGRAM(s) Oral every 6 hours PRN Temp greater or equal to 38C (100.4F), Mild Pain (1 - 3)  albuterol/ipratropium for Nebulization 3 milliLiter(s) Nebulizer every 6 hours PRN Shortness of Breath and/or Wheezing  aluminum hydroxide/magnesium hydroxide/simethicone Suspension 30 milliLiter(s) Oral every 4 hours PRN Dyspepsia  dextrose Oral Gel 15 Gram(s) Oral once PRN Blood Glucose LESS THAN 70 milliGRAM(s)/deciliter  melatonin 3 milliGRAM(s) Oral at bedtime PRN Insomnia  nitroglycerin     SubLingual 0.4 milliGRAM(s) SubLingual every 5 minutes PRN Chest Pain      Diet, Pureed:   Consistent Carbohydrate Evening Snack  Low Sodium  Lacto-Ovo Veg (Accepts Milk Prod., Eggs)  Tube Feeding Modality: Gastrostomy  Glucerna 1.2 Pedrito  Total Volume for 24 Hours (mL): 720  Intermittent  Until Goal Tube Feed Rate (mL per Hour): 60  Tube Feeding Hours ON: 12  Tube Feeding OFF (Hours): 12  Tube Feed Start Time: 19:00  Liquid Protein Supplement     Qty per Day:  1  Supplement Feeding Modality:  Oral  Glucerna Shake Cans or Servings Per Day:  1       Frequency:  Two Times a day (03-01-23 @ 12:14) [Active]          Vital Signs Last 24 Hrs  T(C): 36.9 (02 Mar 2023 04:45), Max: 36.9 (02 Mar 2023 04:45)  T(F): 98.5 (02 Mar 2023 04:45), Max: 98.5 (02 Mar 2023 04:45)  HR: 84 (02 Mar 2023 05:55) (72 - 88)  BP: 127/78 (02 Mar 2023 04:45) (114/70 - 135/75)  BP(mean): --  RR: 16 (02 Mar 2023 05:55) (16 - 18)  SpO2: 100% (02 Mar 2023 05:55) (95% - 100%)    Parameters below as of 02 Mar 2023 05:55  Patient On (Oxygen Delivery Method): tracheostomy collar  O2 Flow (L/min): 6  O2 Concentration (%): 28      03-01-23 @ 07:01  -  03-02-23 @ 07:00  --------------------------------------------------------  IN: 120 mL / OUT: 500 mL / NET: -380 mL              LABS:                        10.8   8.72  )-----------( 201      ( 02 Mar 2023 08:20 )             33.9                     WBC:  WBC Count: 8.72 K/uL (03-02 @ 08:20)  WBC Count: 7.99 K/uL (02-27 @ 07:12)      MICROBIOLOGY:  RECENT CULTURES:                  Sodium:  Sodium, Serum: 138 mmol/L (02-27 @ 07:12)      0.78 mg/dL 02-27 @ 07:12      Hemoglobin:  Hemoglobin: 10.8 g/dL (03-02 @ 08:20)  Hemoglobin: 11.1 g/dL (02-27 @ 07:12)      Platelets: Platelet Count - Automated: 201 K/uL (03-02 @ 08:20)  Platelet Count - Automated: 215 K/uL (02-27 @ 07:12)              RADIOLOGY & ADDITIONAL STUDIES:      MICROBIOLOGY:  RECENT CULTURES:

## 2023-03-02 NOTE — PROGRESS NOTE ADULT - ASSESSMENT
REVIEW OF SYMPTOMS      Able to give (reliable) ROS  NO     PHYSICAL EXAM    HEENT Unremarkable  atraumatic   RESP Fair air entry EXP prolonged    Harsh breath sound Resp distres mild   CARDIAC S1 S2 No S3     NO JVD    ABDOMEN SOFT BS PRESENT NOT DISTENDED No hepatosplenomegaly   PEDAL EDEMA present No calf tenderness  NO rash       GENERAL DATA .   GO.  12/11/2022 full code       ALLGY.  nka                            WT. ..  12/2/2022 86  BMI. ..    12/2/2022 29                          ICU STAY.  .. 11/29-12/9  COVID.   .. 12/2/2022 scv2 (-)   .. 11/20/2022 scv2 (-)   BEST PRACTICE ISSUES.    HOB ELEVATN. Yes  DVT PPLX.    .. 1/3/2023 apixa 5.2 (vte)    (DVT 12/12 l Subclav throm)  ALEJO PPLX.   INFN PPLX.   .. 11/14 chlorhex 2%   SP SW ANTWAN.         DIET.    ..  12/15 glucerna 1.2 1440 gt   IV fl.    PROCEDURES.  .. 2/15/2023 pmvaslve placd   .. 1/28  size 4 fenestrated cuffed trach placed by surgery     PATIENT PRESENTATION.  72 m doa 11/14/2022 cac    PMH  PMH CAD s/p PCI with stent 2015 and CABG 2014,   PMH  s/p medtronic PPM,   PMH CVA (lacunar infarct)    HOSPITAL COURSE   .. 1) PEA arrest with ROSC after approximately 5 min 11/14  Now communicative   .. 2) DVT 12/12 l Subclav thromS 12/15/2022 lvnx 80.2 1/3 changed to apixaban 5.2  .. 3) TRACH 12/5/2022 trach  .. 4) PEG12/5/2022 peg   .. 5) Cellulitis hand absc 12/13 mod enterococcus fecalis  staph epi 12/12-12/15/2022  cephalexin 12/15 vanco once  12/16-12/19 zosyn  .. 6) Vent weaning       PROBLEM ASSESSMENT RECOMMENDATIONS.  Trach 12/5   .. trach care   Trach change  ..  1/28 size 4 fenestrated cuffed trach  Trach wean.  .. 2/6/2023 pt still has lot of secretions  .. 2/27/2023 dw nurs Pt still needing lot of suctioning   .. 2/28/2023 needed lesser suctioning   VTE  .. On apixaba  INFECTION.  .. w 1/17-1/18-1/20-1/21-2/3-2/8-2/10-2/15-2/27/2023      w 8.3- 7.9 - 6.8 - 7.2 - 8.8  - 10- 10.8- 10.8-7.9    .. pr 1/17-1/20/2023 (-)  (-)   .. 1/16-1/20/2023 Rocephin started by hospitalist luke   CAD.  .. 11/14 asa 81   .. 12/13 metoprolol 25.2   CHF   .. Cr 2/6/2023 Cr .7   .. 11/14/2022 echo mod decr segmental lvsf apical lateral apiccal ant segment are abn takotsubo cmpthy pasp 42 .  .. Monitor for chf has chr hfref per echo   COPD   .. 2/1 duoneb p   .. 12/30 symbicort 160   .. 1/7/2023 glycopyrrolate 1.2   .. 1/9/2023 ipratropium  .. 1/15/2023 scopolamine   .. continue bd ics for copd   Anemia.  .. Hb 1/12-1/14-1/19-1/20-7/21-1/25-2/3-2/8-2/10-2/15-2/27/2023       Hb 9.8- 10 -9.1- 9.8  - 9.2 - 9.5 -10- 10 - 10.5 -10.8- 11   .. target hb 7 (+)  .. monitor   sp cac   .. good neuro recovery awake alert interactive   VTE  .. 1/3/2023 apixa 5.2 (vte)    FAMILY COMMUNICATION.  .. 2/12/2023 dw pt son  will ask speech eval for passey cordelia valve  .. 2/12/2023 Pt has a fenestrated trach and has fenestrated inner cannula which is available to place before trying passy cordelia valve     OVERALL   WEANED OFF VENT 1/23   TRACH WEANING    Will consider decannulation when secretions decrease  2/14/2023 dw speech they will try PM valve with fenestrated cannula   2/16/2023 tolerating pm valve  PASSY CORDELIA VALVE   .. 2/15/2023 placed pmv during day time  REHAB 2/16/2023 pt eval requested   FLAILING OF LOWER EXTR   .. Jersks started 1/16   .. 1/19/2023 myoclonic jerks improvd on depakote         TIME SPENT   Over 25 minutes aggregate care time spent on encounter; activities included   direct patient care, counseling and/or coordinating care reviewing notes, lab data/ imaging , discussion with multidisciplinary team/ patient  /family and explaining in detail risks, benefits, alternatives  of the recommendations     BRANDON CAMARILLO

## 2023-03-02 NOTE — PROGRESS NOTE ADULT - ASSESSMENT
73 yo male w/ PMH of COPD, CAD sp PCI w/ stent, CABG 2014, HF w/ PeF, 2nd degree heart block, sp PPM, HTN, DM, CKD Stage 3, CVA- lacunar infarcts admitted to ICU originally for cardiac arrest. ACLS for PEA arrest and ROSC returned after 5 minutes. Cardiac arrest possibly secondary to severe hypoglycemia from eating less and not tracking blood sugar carefully. Hospital course complicated by 1) prolonged hypoxemic respiratory failure. Difficult extubation requiring s/p trach and peg 2) left upper extremity/left subclavian DVT and placed on Eliquis 3) multiple infections (Enterococcal faecalis cellulitis, infected left hand hematoma, tracheobronchitis secondary to citrobacter 4) tonic clonic seizure - on Keppra / Depakote - currently undergoing reevaluation by NEUROLOGY 5) fever of 102 on 1/19.     # Acute/Chronic Respiratory failure w/ Hypoxia and Hypercapnia/ COPD, now trach-vent dependent.  - S/p intubation; failed extubation requiring s/p trach and peg   - PMV (Passy Ramesh Valve) placed on 02/15/2023.  - tolerating Trach collar, vent discontinued; continue weaning as per Pulmonary   - cont w/ Symbicort, Duonebs prn  - Surgery on board, s/p Trach change to #4 fenestrated tracheostomy placed 1/30. Replaced on 02/14/2023.  - Had VFSS/MBS eval on 02/16/2023: Recommended Puree with thin liquids, c/w diet with tube feed for supplementation   - Frequent suction  - Capping trial per pulmonary  - Pulmonary following, kain recs    # Myoclonic Seizure   - EEG: Myoclonic seizure  - Neurology on board   - cont w/ Keppra and Depakote.  - Myoclonic episode to LLE and LUE noted by his son.  - Mg 2.3 and K 4.1.  - Depakote level 55 (normal ); Keppra level pending.  - D/w Dr Loo (02/18/2023): recommended increase only the evening dose of Depakote from 750 mg to 1000 mg. and continue 750 mg.    # S/p Cardiac Arrest, acute metabolic encephalopathy due to severe hypoglycemia.  - s/p ACLS w/ ROSC after 5 min   - 2nd degree heart block, s/p PPM in place.  - ECHO Takotsubo cardiomyopathy. EF 50-55%, LVH, mild pHTN    - s/p ICU course; Hemodynamically stable now  - cont w/ Metoprolol, ASA and atorvastatin.    # Multiple infectious Disease   - tracheo-bronchitis secondary to citrobacter?? - resolved.  - enterococcal faecalis cellulitis - resolved   - infected left hand hematoma - s/p bedside debridement 12/13 w/ hand surgery   - monitor off abx              # Occlusive Left subclavian thrombosis   - LUE venous duplex : occlusive thrombus in the left mid subclavian vein with partial slow flow detected in the lateral subclavian vein.  - LUE arterial Duplex neg   - cont w/ Eliquis     # DM2   - A1c 8.8%  - cont w/ Lantus   - cont w/ FS monitoring w/ insulin s/s coverage     # HTN, Cad s/p PCI w/ stent/ CABG and HPL.  - cont with ASA, Metoprolol and Atorvastatin     # Anemia of Chronic Disease  - h/h stable, will cont  to monitor   - Hb 10.9, stable.    # Stage 3-4 sacral decubiti wound  - s/p debridement 12/19  -cont w/ wound care as recommended     # Dysphagia/ Moderate Protein Calorie Malnutrition  - Peg tube in place   - continue with tube feeding at goal as tolerated.     # Constipation  - cont w/ senna, Miralax prn     DVT prophylaxis: Eliquis.    Disposition: Patient has no insurance and Family in process of getting guardianship, court date 3/12.       d/w maya Quinn at bedside.

## 2023-03-02 NOTE — PROGRESS NOTE ADULT - SUBJECTIVE AND OBJECTIVE BOX
Patient: BRANDON CAMARILLO [id number] 72y Male                            Hospitalist Attending Note    No complaints.     ____________________PHYSICAL EXAM:  GENERAL:  NAD, alert, interactive.    NECK: Trach in place.    HEENT: NCAT  CARDIOVASCULAR:  S1, S2  LUNGS: CTAB  ABDOMEN:  soft, (-) tenderness, (-) distension, (+) bowel sounds, (-) guarding, (-) rebound (-) rigidity  EXTREMITIES:  no cyanosis / clubbing / edema.   BACK: Stage II Sacral / Coccygeal Decubitus Ulcer measuring ~ 2x2cm, clean base.   ____________________    VITALS:  Vital Signs Last 24 Hrs  T(C): 36.7 (02 Mar 2023 16:15), Max: 37 (02 Mar 2023 10:55)  T(F): 98 (02 Mar 2023 16:15), Max: 98.6 (02 Mar 2023 10:55)  HR: 72 (02 Mar 2023 18:15) (69 - 84)  BP: 140/80 (02 Mar 2023 16:15) (121/76 - 140/80)  BP(mean): --  RR: 20 (02 Mar 2023 18:15) (16 - 20)  SpO2: 99% (02 Mar 2023 18:15) (95% - 100%)    Parameters below as of 02 Mar 2023 18:15  Patient On (Oxygen Delivery Method): tracheostomy collar  O2 Flow (L/min): 4  O2 Concentration (%): 30 Daily     Daily   CAPILLARY BLOOD GLUCOSE      POCT Blood Glucose.: 162 mg/dL (02 Mar 2023 17:17)  POCT Blood Glucose.: 186 mg/dL (02 Mar 2023 16:09)  POCT Blood Glucose.: 160 mg/dL (02 Mar 2023 11:33)  POCT Blood Glucose.: 119 mg/dL (02 Mar 2023 05:38)  POCT Blood Glucose.: 233 mg/dL (02 Mar 2023 00:07)  POCT Blood Glucose.: 222 mg/dL (01 Mar 2023 21:02)    I&O's Summary    01 Mar 2023 07:01  -  02 Mar 2023 07:00  --------------------------------------------------------  IN: 120 mL / OUT: 500 mL / NET: -380 mL        LABS:                        10.8   8.72  )-----------( 201      ( 02 Mar 2023 08:20 )             33.9     03-02    140  |  101  |  24<H>  ----------------------------<  136<H>  4.1   |  33<H>  |  0.76    Ca    8.3<L>      02 Mar 2023 08:20                    MEDICATIONS:  acetaminophen     Tablet .. 650 milliGRAM(s) Oral every 6 hours PRN  albuterol/ipratropium for Nebulization 3 milliLiter(s) Nebulizer every 6 hours PRN  aluminum hydroxide/magnesium hydroxide/simethicone Suspension 30 milliLiter(s) Oral every 4 hours PRN  apixaban 5 milliGRAM(s) Oral every 12 hours  aspirin  chewable 81 milliGRAM(s) Oral daily  atorvastatin 20 milliGRAM(s) Oral at bedtime  bacitracin   Ointment 1 Application(s) Topical two times a day  budesonide 160 MICROgram(s)/formoterol 4.5 MICROgram(s) Inhaler 2 Puff(s) Inhalation two times a day  chlorhexidine 2% Cloths 1 Application(s) Topical <User Schedule>  cyanocobalamin 1000 MICROGram(s) Oral daily  dextrose 5%. 1000 milliLiter(s) IV Continuous <Continuous>  dextrose 5%. 1000 milliLiter(s) IV Continuous <Continuous>  dextrose 50% Injectable 25 Gram(s) IV Push once  dextrose 50% Injectable 12.5 Gram(s) IV Push once  dextrose 50% Injectable 25 Gram(s) IV Push once  dextrose Oral Gel 15 Gram(s) Oral once PRN  diphenhydrAMINE Injectable 50 milliGRAM(s) IV Push once  folic acid 1 milliGRAM(s) Oral daily  glucagon  Injectable 1 milliGRAM(s) IntraMuscular once  glycopyrrolate 1 milliGRAM(s) Oral two times a day  insulin glargine Injectable (LANTUS) 10 Unit(s) SubCutaneous at bedtime  insulin lispro (ADMELOG) corrective regimen sliding scale   SubCutaneous every 6 hours  levETIRAcetam  Solution 1500 milliGRAM(s) Oral two times a day  melatonin 3 milliGRAM(s) Oral at bedtime PRN  metoprolol tartrate 25 milliGRAM(s) Enteral Tube two times a day  multivitamin/minerals/iron Oral Solution (CENTRUM) 15 milliLiter(s) Enteral Tube daily  nitroglycerin     SubLingual 0.4 milliGRAM(s) SubLingual every 5 minutes PRN  pantoprazole   Suspension 40 milliGRAM(s) Oral before breakfast  polyethylene glycol 3350 17 Gram(s) Oral daily  scopolamine 1 mG/72 Hr(s) Patch 1 Patch Transdermal every 72 hours  senna 2 Tablet(s) Oral at bedtime  valproic  acid Syrup 1000 milliGRAM(s) Oral <User Schedule>  valproic  acid Syrup 750 milliGRAM(s) Oral <User Schedule>  vitamin A &amp; D Ointment 1 Application(s) Topical two times a day

## 2023-03-03 LAB
GLUCOSE BLDC GLUCOMTR-MCNC: 135 MG/DL — HIGH (ref 70–99)
GLUCOSE BLDC GLUCOMTR-MCNC: 160 MG/DL — HIGH (ref 70–99)
GLUCOSE BLDC GLUCOMTR-MCNC: 179 MG/DL — HIGH (ref 70–99)
GLUCOSE BLDC GLUCOMTR-MCNC: 191 MG/DL — HIGH (ref 70–99)
GLUCOSE BLDC GLUCOMTR-MCNC: 82 MG/DL — SIGNIFICANT CHANGE UP (ref 70–99)

## 2023-03-03 PROCEDURE — 99232 SBSQ HOSP IP/OBS MODERATE 35: CPT

## 2023-03-03 RX ADMIN — APIXABAN 5 MILLIGRAM(S): 2.5 TABLET, FILM COATED ORAL at 06:11

## 2023-03-03 RX ADMIN — SCOPALAMINE 1 PATCH: 1 PATCH, EXTENDED RELEASE TRANSDERMAL at 19:38

## 2023-03-03 RX ADMIN — LEVETIRACETAM 1500 MILLIGRAM(S): 250 TABLET, FILM COATED ORAL at 17:39

## 2023-03-03 RX ADMIN — ROBINUL 1 MILLIGRAM(S): 0.2 INJECTION INTRAMUSCULAR; INTRAVENOUS at 17:39

## 2023-03-03 RX ADMIN — Medication 750 MILLIGRAM(S): at 07:22

## 2023-03-03 RX ADMIN — INSULIN GLARGINE 10 UNIT(S): 100 INJECTION, SOLUTION SUBCUTANEOUS at 22:06

## 2023-03-03 RX ADMIN — Medication 15 MILLILITER(S): at 12:02

## 2023-03-03 RX ADMIN — Medication 1 APPLICATION(S): at 06:31

## 2023-03-03 RX ADMIN — SCOPALAMINE 1 PATCH: 1 PATCH, EXTENDED RELEASE TRANSDERMAL at 07:18

## 2023-03-03 RX ADMIN — PREGABALIN 1000 MICROGRAM(S): 225 CAPSULE ORAL at 12:02

## 2023-03-03 RX ADMIN — Medication 1: at 06:45

## 2023-03-03 RX ADMIN — Medication 1 MILLIGRAM(S): at 12:02

## 2023-03-03 RX ADMIN — Medication 1 APPLICATION(S): at 17:36

## 2023-03-03 RX ADMIN — APIXABAN 5 MILLIGRAM(S): 2.5 TABLET, FILM COATED ORAL at 17:39

## 2023-03-03 RX ADMIN — BUDESONIDE AND FORMOTEROL FUMARATE DIHYDRATE 2 PUFF(S): 160; 4.5 AEROSOL RESPIRATORY (INHALATION) at 05:59

## 2023-03-03 RX ADMIN — Medication 1000 MILLIGRAM(S): at 20:40

## 2023-03-03 RX ADMIN — Medication 1 APPLICATION(S): at 17:35

## 2023-03-03 RX ADMIN — ROBINUL 1 MILLIGRAM(S): 0.2 INJECTION INTRAMUSCULAR; INTRAVENOUS at 06:11

## 2023-03-03 RX ADMIN — Medication 1: at 12:03

## 2023-03-03 RX ADMIN — SENNA PLUS 2 TABLET(S): 8.6 TABLET ORAL at 21:25

## 2023-03-03 RX ADMIN — Medication 25 MILLIGRAM(S): at 06:11

## 2023-03-03 RX ADMIN — Medication 25 MILLIGRAM(S): at 17:39

## 2023-03-03 RX ADMIN — ATORVASTATIN CALCIUM 20 MILLIGRAM(S): 80 TABLET, FILM COATED ORAL at 21:28

## 2023-03-03 RX ADMIN — CHLORHEXIDINE GLUCONATE 1 APPLICATION(S): 213 SOLUTION TOPICAL at 06:31

## 2023-03-03 RX ADMIN — PANTOPRAZOLE SODIUM 40 MILLIGRAM(S): 20 TABLET, DELAYED RELEASE ORAL at 06:12

## 2023-03-03 RX ADMIN — Medication 81 MILLIGRAM(S): at 12:02

## 2023-03-03 RX ADMIN — BUDESONIDE AND FORMOTEROL FUMARATE DIHYDRATE 2 PUFF(S): 160; 4.5 AEROSOL RESPIRATORY (INHALATION) at 17:40

## 2023-03-03 RX ADMIN — LEVETIRACETAM 1500 MILLIGRAM(S): 250 TABLET, FILM COATED ORAL at 06:09

## 2023-03-03 NOTE — PROGRESS NOTE ADULT - ASSESSMENT
REVIEW OF SYMPTOMS      Able to give (reliable) ROS  NO     PHYSICAL EXAM    HEENT Unremarkable  atraumatic   RESP Fair air entry EXP prolonged    Harsh breath sound Resp distres mild   CARDIAC S1 S2 No S3     NO JVD    ABDOMEN SOFT BS PRESENT NOT DISTENDED No hepatosplenomegaly   PEDAL EDEMA present No calf tenderness  NO rash       GENERAL DATA .   GOC.  12/11/2022 full code       ALLGY.  nka                            WT. ..  12/2/2022 86  BMI. ..    12/2/2022 29                          ICU STAY.  .. 11/29-12/9  COVID.   .. 12/2/2022 scv2 (-)   .. 11/20/2022 scv2 (-)   BEST PRACTICE ISSUES.    HOB ELEVATN. Yes  DVT PPLX.    .. 1/3/2023 apixa 5.2 (vte)    (DVT 12/12 l Subclav throm)  ALEJO PPLX.   INFN PPLX.   .. 11/14 chlorhex 2%   SP SW ANTWAN.         DIET.    ..  12/15 glucerna 1.2 1440 gt   IV fl.    PROCEDURES.  .. 2/15/2023 pmvaslve placd   .. 1/28  size 4 fenestrated cuffed trach placed by surgery     ABGS.  1/6/2023 ac 16/450/.3/5 746/49/123     VS/ PO/IO/ VENT/ DRIPS.  3/3/2023 afeb 80 120/80   3/3/2023 ra 97%     PATIENT PRESENTATION.  72 m doa 11/14/2022 cac    PMH  PMH CAD s/p PCI with stent 2015 and CABG 2014,   PMH  s/p medtronic PPM,   PMH CVA (lacunar infarct)    HOSPITAL COURSE   .. 1) PEA arrest with ROSC after approximately 5 min 11/14  Now communicative   .. 2) DVT 12/12 l Subclav thromS 12/15/2022 lvnx 80.2 1/3 changed to apixaban 5.2  .. 3) TRACH 12/5/2022 trach  .. 4) PEG12/5/2022 peg   .. 5) Cellulitis hand absc 12/13 mod enterococcus fecalis  staph epi 12/12-12/15/2022  cephalexin 12/15 vanco once  12/16-12/19 zosyn  .. 6) Vent weaning       PROBLEM ASSESSMENT RECOMMENDATIONS.  Trach 12/5   .. trach care   Trach change  ..  1/28 size 4 fenestrated cuffed trach  Trach wean.  .. 2/6/2023 pt still has lot of secretions  .. 2/27/2023 dw nurs Pt still needing lot of suctioning   .. 2/28/2023 needed lesser suctioning   VTE  .. On apixaba  INFECTION.  .. w 1/17-1/18-1/20-1/21-2/3-2/8-2/10-2/15-2/27/2023      w 8.3- 7.9 - 6.8 - 7.2 - 8.8  - 10- 10.8- 10.8-7.9    .. pr 1/17-1/20/2023 (-)  (-)   .. 1/16-1/20/2023 Rocephin started by hospitalist dced   CAD.  .. 11/14 asa 81   .. 12/13 metoprolol 25.2   CHF   .. Cr 2/6/2023 Cr .7   .. 11/14/2022 echo mod decr segmental lvsf apical lateral apiccal ant segment are abn takotsubo cmpthy pasp 42 .  .. Monitor for chf has chr hfref per echo   COPD   .. 2/1 duoneb p   .. 12/30 symbicort 160   .. 1/7/2023 glycopyrrolate 1.2   .. 1/9/2023 ipratropium  .. 1/15/2023 scopolamine   .. continue bd ics for copd   Anemia.  .. Hb 1/12-1/14-1/19-1/20-7/21-1/25-2/3-2/8-2/10-2/15-2/27/2023       Hb 9.8- 10 -9.1- 9.8  - 9.2 - 9.5 -10- 10 - 10.5 -10.8- 11   .. target hb 7 (+)  .. monitor   sp cac   .. good neuro recovery awake alert interactive   VTE  .. 1/3/2023 apixa 5.2 (vte)    FAMILY COMMUNICATION.  .. 2/12/2023 dw pt son  will ask speech eval for passey cordelia valve  .. 2/12/2023 Pt has a fenestrated trach and has fenestrated inner cannula which is available to place before trying passy cordelia valve     OVERALL   WEANED OFF VENT 1/23   TRACH WEANING    Will consider decannulation when secretions decrease  2/14/2023 dw speech they will try PM valve with fenestrated cannula   2/16/2023 tolerating pm valve  PASSY CORDELIA VALVE   .. 2/15/2023 placed pmv during day time  REHAB 2/16/2023 pt eval requested   FLAILING OF LOWER EXTR   .. Jersks started 1/16   .. 1/19/2023 myoclonic jerks improvd on depakote       TIME SPENT   Over 25 minutes aggregate care time spent on encounter; activities included   direct patient care, counseling and/or coordinating care reviewing notes, lab data/ imaging , discussion with multidisciplinary team/ patient  /family and explaining in detail risks, benefits, alternatives  of the recommendations     BRANDON CAMARILLO

## 2023-03-03 NOTE — PROGRESS NOTE ADULT - SUBJECTIVE AND OBJECTIVE BOX
BRANDON CAMARILLO    [address] [address]    Allergies    No Known Allergies    Intolerances        PAST MEDICAL & SURGICAL HISTORY:  HTN (hypertension)      HLD (hyperlipidemia)      Diabetes mellitus      Lacunar infarction      BPH (benign prostatic hyperplasia)      CHF (congestive heart failure)      Chronic obstructive pulmonary disease, unspecified COPD type      S/P CABG (coronary artery bypass graft)  2014          FAMILY HISTORY:      Home Medications:  aspirin 81 mg oral delayed release tablet: 1 tab(s) orally once a day (12 Jun 2020 17:35)  Liquifilm Tears preserved ophthalmic solution: 1 drop(s) to each affected eye 2 times a day (12 Jun 2020 17:35)      MEDICATIONS  (STANDING):  apixaban 5 milliGRAM(s) Oral every 12 hours  aspirin  chewable 81 milliGRAM(s) Oral daily  atorvastatin 20 milliGRAM(s) Oral at bedtime  bacitracin   Ointment 1 Application(s) Topical two times a day  budesonide 160 MICROgram(s)/formoterol 4.5 MICROgram(s) Inhaler 2 Puff(s) Inhalation two times a day  chlorhexidine 2% Cloths 1 Application(s) Topical <User Schedule>  cyanocobalamin 1000 MICROGram(s) Oral daily  dextrose 5%. 1000 milliLiter(s) (100 mL/Hr) IV Continuous <Continuous>  dextrose 5%. 1000 milliLiter(s) (50 mL/Hr) IV Continuous <Continuous>  dextrose 50% Injectable 25 Gram(s) IV Push once  dextrose 50% Injectable 12.5 Gram(s) IV Push once  dextrose 50% Injectable 25 Gram(s) IV Push once  diphenhydrAMINE Injectable 50 milliGRAM(s) IV Push once  folic acid 1 milliGRAM(s) Oral daily  glucagon  Injectable 1 milliGRAM(s) IntraMuscular once  glycopyrrolate 1 milliGRAM(s) Oral two times a day  insulin glargine Injectable (LANTUS) 10 Unit(s) SubCutaneous at bedtime  insulin lispro (ADMELOG) corrective regimen sliding scale   SubCutaneous every 6 hours  levETIRAcetam  Solution 1500 milliGRAM(s) Oral two times a day  metoprolol tartrate 25 milliGRAM(s) Enteral Tube two times a day  multivitamin/minerals/iron Oral Solution (CENTRUM) 15 milliLiter(s) Enteral Tube daily  pantoprazole   Suspension 40 milliGRAM(s) Oral before breakfast  polyethylene glycol 3350 17 Gram(s) Oral daily  scopolamine 1 mG/72 Hr(s) Patch 1 Patch Transdermal every 72 hours  senna 2 Tablet(s) Oral at bedtime  valproic  acid Syrup 1000 milliGRAM(s) Oral <User Schedule>  valproic  acid Syrup 750 milliGRAM(s) Oral <User Schedule>  vitamin A &amp; D Ointment 1 Application(s) Topical two times a day    MEDICATIONS  (PRN):  acetaminophen     Tablet .. 650 milliGRAM(s) Oral every 6 hours PRN Temp greater or equal to 38C (100.4F), Mild Pain (1 - 3)  albuterol/ipratropium for Nebulization 3 milliLiter(s) Nebulizer every 6 hours PRN Shortness of Breath and/or Wheezing  aluminum hydroxide/magnesium hydroxide/simethicone Suspension 30 milliLiter(s) Oral every 4 hours PRN Dyspepsia  dextrose Oral Gel 15 Gram(s) Oral once PRN Blood Glucose LESS THAN 70 milliGRAM(s)/deciliter  melatonin 3 milliGRAM(s) Oral at bedtime PRN Insomnia  nitroglycerin     SubLingual 0.4 milliGRAM(s) SubLingual every 5 minutes PRN Chest Pain      Diet, Pureed:   Consistent Carbohydrate Evening Snack  Low Sodium  Lacto-Ovo Veg (Accepts Milk Prod., Eggs)  Tube Feeding Modality: Gastrostomy  Glucerna 1.2 Pedrito  Total Volume for 24 Hours (mL): 720  Intermittent  Until Goal Tube Feed Rate (mL per Hour): 60  Tube Feeding Hours ON: 12  Tube Feeding OFF (Hours): 12  Tube Feed Start Time: 19:00  Liquid Protein Supplement     Qty per Day:  1  Supplement Feeding Modality:  Oral  Glucerna Shake Cans or Servings Per Day:  1       Frequency:  Two Times a day (03-01-23 @ 12:14) [Active]          Vital Signs Last 24 Hrs  T(C): 36.8 (03 Mar 2023 04:34), Max: 37 (02 Mar 2023 10:55)  T(F): 98.2 (03 Mar 2023 04:34), Max: 98.6 (02 Mar 2023 10:55)  HR: 68 (03 Mar 2023 06:04) (68 - 80)  BP: 150/77 (03 Mar 2023 04:34) (121/76 - 150/77)  BP(mean): --  RR: 20 (03 Mar 2023 06:04) (18 - 20)  SpO2: 100% (03 Mar 2023 06:04) (96% - 100%)    Parameters below as of 03 Mar 2023 06:04  Patient On (Oxygen Delivery Method): tracheostomy collar  O2 Flow (L/min): 6  O2 Concentration (%): 28      03-02-23 @ 07:01  -  03-03-23 @ 07:00  --------------------------------------------------------  IN: 1080 mL / OUT: 0 mL / NET: 1080 mL              LABS:                        10.8   8.72  )-----------( 201      ( 02 Mar 2023 08:20 )             33.9     03-02    140  |  101  |  24<H>  ----------------------------<  136<H>  4.1   |  33<H>  |  0.76    Ca    8.3<L>      02 Mar 2023 08:20                WBC:  WBC Count: 8.72 K/uL (03-02 @ 08:20)      MICROBIOLOGY:  RECENT CULTURES:                  Sodium:  Sodium, Serum: 140 mmol/L (03-02 @ 08:20)      0.76 mg/dL 03-02 @ 08:20      Hemoglobin:  Hemoglobin: 10.8 g/dL (03-02 @ 08:20)      Platelets: Platelet Count - Automated: 201 K/uL (03-02 @ 08:20)              RADIOLOGY & ADDITIONAL STUDIES:      MICROBIOLOGY:  RECENT CULTURES:

## 2023-03-03 NOTE — PROGRESS NOTE ADULT - SUBJECTIVE AND OBJECTIVE BOX
Patient: BRANDON CAMARILLO [id number] 72y Male                            Hospitalist Attending Note    No complaints.     ____________________PHYSICAL EXAM:  GENERAL:  NAD, alert, interactive.    NECK: Trach in place.    HEENT: NCAT  CARDIOVASCULAR:  S1, S2  LUNGS: CTAB  ABDOMEN:  soft, (-) tenderness, (-) distension, (+) bowel sounds, (-) guarding, (-) rebound (-) rigidity  EXTREMITIES:  no cyanosis / clubbing / edema.   BACK: Stage II Sacral / Coccygeal Decubitus Ulcer measuring ~ 2x2cm, clean base.   ____________________    VITALS:  Vital Signs Last 24 Hrs  T(C): 37.1 (03 Mar 2023 16:58), Max: 37.1 (03 Mar 2023 16:58)  T(F): 98.8 (03 Mar 2023 16:58), Max: 98.8 (03 Mar 2023 16:58)  HR: 75 (03 Mar 2023 16:58) (68 - 84)  BP: 119/69 (03 Mar 2023 16:58) (119/69 - 150/77)  BP(mean): 86 (03 Mar 2023 16:58) (86 - 86)  RR: 19 (03 Mar 2023 16:58) (18 - 20)  SpO2: 99% (03 Mar 2023 16:58) (96% - 100%)    Parameters below as of 03 Mar 2023 16:58  Patient On (Oxygen Delivery Method): room air     Daily     Daily   CAPILLARY BLOOD GLUCOSE      POCT Blood Glucose.: 82 mg/dL (03 Mar 2023 17:33)  POCT Blood Glucose.: 191 mg/dL (03 Mar 2023 11:55)  POCT Blood Glucose.: 179 mg/dL (03 Mar 2023 06:40)  POCT Blood Glucose.: 135 mg/dL (03 Mar 2023 00:20)  POCT Blood Glucose.: 126 mg/dL (02 Mar 2023 20:59)    I&O's Summary    02 Mar 2023 07:01  -  03 Mar 2023 07:00  --------------------------------------------------------  IN: 1080 mL / OUT: 0 mL / NET: 1080 mL        LABS:                        10.8   8.72  )-----------( 201      ( 02 Mar 2023 08:20 )             33.9     03-02    140  |  101  |  24<H>  ----------------------------<  136<H>  4.1   |  33<H>  |  0.76    Ca    8.3<L>      02 Mar 2023 08:20                    MEDICATIONS:  acetaminophen     Tablet .. 650 milliGRAM(s) Oral every 6 hours PRN  albuterol/ipratropium for Nebulization 3 milliLiter(s) Nebulizer every 6 hours PRN  aluminum hydroxide/magnesium hydroxide/simethicone Suspension 30 milliLiter(s) Oral every 4 hours PRN  apixaban 5 milliGRAM(s) Oral every 12 hours  aspirin  chewable 81 milliGRAM(s) Oral daily  atorvastatin 20 milliGRAM(s) Oral at bedtime  bacitracin   Ointment 1 Application(s) Topical two times a day  budesonide 160 MICROgram(s)/formoterol 4.5 MICROgram(s) Inhaler 2 Puff(s) Inhalation two times a day  chlorhexidine 2% Cloths 1 Application(s) Topical <User Schedule>  cyanocobalamin 1000 MICROGram(s) Oral daily  dextrose 5%. 1000 milliLiter(s) IV Continuous <Continuous>  dextrose 5%. 1000 milliLiter(s) IV Continuous <Continuous>  dextrose 50% Injectable 25 Gram(s) IV Push once  dextrose 50% Injectable 12.5 Gram(s) IV Push once  dextrose 50% Injectable 25 Gram(s) IV Push once  dextrose Oral Gel 15 Gram(s) Oral once PRN  diphenhydrAMINE Injectable 50 milliGRAM(s) IV Push once  folic acid 1 milliGRAM(s) Oral daily  glucagon  Injectable 1 milliGRAM(s) IntraMuscular once  glycopyrrolate 1 milliGRAM(s) Oral two times a day  insulin glargine Injectable (LANTUS) 10 Unit(s) SubCutaneous at bedtime  insulin lispro (ADMELOG) corrective regimen sliding scale   SubCutaneous every 6 hours  levETIRAcetam  Solution 1500 milliGRAM(s) Oral two times a day  melatonin 3 milliGRAM(s) Oral at bedtime PRN  metoprolol tartrate 25 milliGRAM(s) Enteral Tube two times a day  multivitamin/minerals/iron Oral Solution (CENTRUM) 15 milliLiter(s) Enteral Tube daily  nitroglycerin     SubLingual 0.4 milliGRAM(s) SubLingual every 5 minutes PRN  pantoprazole   Suspension 40 milliGRAM(s) Oral before breakfast  polyethylene glycol 3350 17 Gram(s) Oral daily  scopolamine 1 mG/72 Hr(s) Patch 1 Patch Transdermal every 72 hours  senna 2 Tablet(s) Oral at bedtime  valproic  acid Syrup 1000 milliGRAM(s) Oral <User Schedule>  valproic  acid Syrup 750 milliGRAM(s) Oral <User Schedule>  vitamin A &amp; D Ointment 1 Application(s) Topical two times a day

## 2023-03-04 LAB
ANION GAP SERPL CALC-SCNC: 7 MMOL/L — SIGNIFICANT CHANGE UP (ref 5–17)
BUN SERPL-MCNC: 20 MG/DL — SIGNIFICANT CHANGE UP (ref 7–23)
CALCIUM SERPL-MCNC: 8.9 MG/DL — SIGNIFICANT CHANGE UP (ref 8.5–10.1)
CHLORIDE SERPL-SCNC: 105 MMOL/L — SIGNIFICANT CHANGE UP (ref 96–108)
CO2 SERPL-SCNC: 32 MMOL/L — HIGH (ref 22–31)
CREAT SERPL-MCNC: 0.74 MG/DL — SIGNIFICANT CHANGE UP (ref 0.5–1.3)
EGFR: 95 ML/MIN/1.73M2 — SIGNIFICANT CHANGE UP
GLUCOSE BLDC GLUCOMTR-MCNC: 107 MG/DL — HIGH (ref 70–99)
GLUCOSE BLDC GLUCOMTR-MCNC: 124 MG/DL — HIGH (ref 70–99)
GLUCOSE BLDC GLUCOMTR-MCNC: 242 MG/DL — HIGH (ref 70–99)
GLUCOSE BLDC GLUCOMTR-MCNC: 259 MG/DL — HIGH (ref 70–99)
GLUCOSE BLDC GLUCOMTR-MCNC: 89 MG/DL — SIGNIFICANT CHANGE UP (ref 70–99)
GLUCOSE SERPL-MCNC: 117 MG/DL — HIGH (ref 70–99)
HCT VFR BLD CALC: 36.2 % — LOW (ref 39–50)
HGB BLD-MCNC: 11.6 G/DL — LOW (ref 13–17)
MCHC RBC-ENTMCNC: 29.9 PG — SIGNIFICANT CHANGE UP (ref 27–34)
MCHC RBC-ENTMCNC: 32 G/DL — SIGNIFICANT CHANGE UP (ref 32–36)
MCV RBC AUTO: 93.3 FL — SIGNIFICANT CHANGE UP (ref 80–100)
NRBC # BLD: 0 /100 WBCS — SIGNIFICANT CHANGE UP (ref 0–0)
PLATELET # BLD AUTO: 200 K/UL — SIGNIFICANT CHANGE UP (ref 150–400)
POTASSIUM SERPL-MCNC: 3.7 MMOL/L — SIGNIFICANT CHANGE UP (ref 3.5–5.3)
POTASSIUM SERPL-SCNC: 3.7 MMOL/L — SIGNIFICANT CHANGE UP (ref 3.5–5.3)
RBC # BLD: 3.88 M/UL — LOW (ref 4.2–5.8)
RBC # FLD: 14.6 % — HIGH (ref 10.3–14.5)
SODIUM SERPL-SCNC: 144 MMOL/L — SIGNIFICANT CHANGE UP (ref 135–145)
WBC # BLD: 10.36 K/UL — SIGNIFICANT CHANGE UP (ref 3.8–10.5)
WBC # FLD AUTO: 10.36 K/UL — SIGNIFICANT CHANGE UP (ref 3.8–10.5)

## 2023-03-04 PROCEDURE — 99232 SBSQ HOSP IP/OBS MODERATE 35: CPT

## 2023-03-04 RX ADMIN — LEVETIRACETAM 1500 MILLIGRAM(S): 250 TABLET, FILM COATED ORAL at 18:35

## 2023-03-04 RX ADMIN — ROBINUL 1 MILLIGRAM(S): 0.2 INJECTION INTRAMUSCULAR; INTRAVENOUS at 05:07

## 2023-03-04 RX ADMIN — Medication 25 MILLIGRAM(S): at 18:36

## 2023-03-04 RX ADMIN — Medication 15 MILLILITER(S): at 12:03

## 2023-03-04 RX ADMIN — LEVETIRACETAM 1500 MILLIGRAM(S): 250 TABLET, FILM COATED ORAL at 05:06

## 2023-03-04 RX ADMIN — BUDESONIDE AND FORMOTEROL FUMARATE DIHYDRATE 2 PUFF(S): 160; 4.5 AEROSOL RESPIRATORY (INHALATION) at 18:36

## 2023-03-04 RX ADMIN — CHLORHEXIDINE GLUCONATE 1 APPLICATION(S): 213 SOLUTION TOPICAL at 05:06

## 2023-03-04 RX ADMIN — POLYETHYLENE GLYCOL 3350 17 GRAM(S): 17 POWDER, FOR SOLUTION ORAL at 11:40

## 2023-03-04 RX ADMIN — Medication 1 MILLIGRAM(S): at 11:39

## 2023-03-04 RX ADMIN — Medication 1 APPLICATION(S): at 05:11

## 2023-03-04 RX ADMIN — Medication 3: at 12:03

## 2023-03-04 RX ADMIN — SENNA PLUS 2 TABLET(S): 8.6 TABLET ORAL at 22:32

## 2023-03-04 RX ADMIN — Medication 81 MILLIGRAM(S): at 11:39

## 2023-03-04 RX ADMIN — PANTOPRAZOLE SODIUM 40 MILLIGRAM(S): 20 TABLET, DELAYED RELEASE ORAL at 05:05

## 2023-03-04 RX ADMIN — SCOPALAMINE 1 PATCH: 1 PATCH, EXTENDED RELEASE TRANSDERMAL at 11:37

## 2023-03-04 RX ADMIN — APIXABAN 5 MILLIGRAM(S): 2.5 TABLET, FILM COATED ORAL at 05:10

## 2023-03-04 RX ADMIN — ATORVASTATIN CALCIUM 20 MILLIGRAM(S): 80 TABLET, FILM COATED ORAL at 22:28

## 2023-03-04 RX ADMIN — ROBINUL 1 MILLIGRAM(S): 0.2 INJECTION INTRAMUSCULAR; INTRAVENOUS at 18:35

## 2023-03-04 RX ADMIN — Medication 1 APPLICATION(S): at 18:43

## 2023-03-04 RX ADMIN — Medication 25 MILLIGRAM(S): at 05:07

## 2023-03-04 RX ADMIN — Medication 1000 MILLIGRAM(S): at 20:29

## 2023-03-04 RX ADMIN — APIXABAN 5 MILLIGRAM(S): 2.5 TABLET, FILM COATED ORAL at 18:35

## 2023-03-04 RX ADMIN — PREGABALIN 1000 MICROGRAM(S): 225 CAPSULE ORAL at 11:39

## 2023-03-04 RX ADMIN — Medication 750 MILLIGRAM(S): at 08:03

## 2023-03-04 RX ADMIN — SCOPALAMINE 1 PATCH: 1 PATCH, EXTENDED RELEASE TRANSDERMAL at 20:26

## 2023-03-04 RX ADMIN — INSULIN GLARGINE 10 UNIT(S): 100 INJECTION, SOLUTION SUBCUTANEOUS at 22:29

## 2023-03-04 NOTE — PROGRESS NOTE ADULT - ASSESSMENT
73 yo male w/ PMH of COPD, CAD sp PCI w/ stent, CABG 2014, HF w/ PeF, 2nd degree heart block, sp PPM, HTN, DM, CKD Stage 3, CVA- lacunar infarcts admitted to ICU originally for cardiac arrest. ACLS for PEA arrest and ROSC returned after 5 minutes. Cardiac arrest possibly secondary to severe hypoglycemia from eating less and not tracking blood sugar carefully. Hospital course complicated by 1) prolonged hypoxemic respiratory failure. Difficult extubation requiring s/p trach and peg 2) left upper extremity/left subclavian DVT and placed on Eliquis 3) multiple infections (Enterococcal faecalis cellulitis, infected left hand hematoma, tracheobronchitis secondary to citrobacter 4) tonic clonic seizure - on Keppra / Depakote - currently undergoing reevaluation by NEUROLOGY 5) fever of 102 on 1/19.     # Acute/Chronic Respiratory failure w/ Hypoxia and Hypercapnia/ COPD, now trach-vent dependent.  - S/p intubation; failed extubation requiring s/p trach and peg   - PMV (Passy Ramesh Valve) placed on 02/15/2023.  - tolerating Trach collar, vent discontinued; continue weaning as per Pulmonary   - cont w/ Symbicort, Duonebs prn  - Surgery on board, s/p Trach change to #4 fenestrated tracheostomy placed 1/30. Replaced on 02/14/2023.  - Had VFSS/MBS eval on 02/16/2023: Recommended Puree with thin liquids, c/w diet with tube feed for supplementation   - Frequent suction  - Capping trial per pulmonary  - Pulmonary following, kain recs    # Myoclonic Seizure   - EEG: Myoclonic seizure  - Neurology on board   - cont w/ Keppra and Depakote.  - Myoclonic episode to LLE and LUE noted by his son.  - Mg 2.3 and K 4.1.  - Depakote level 55 (normal ); Keppra level pending.  - D/w Dr Loo (02/18/2023): recommended increase only the evening dose of Depakote from 750 mg to 1000 mg. and continue 750 mg.    # S/p Cardiac Arrest, acute metabolic encephalopathy due to severe hypoglycemia.  - s/p ACLS w/ ROSC after 5 min   - 2nd degree heart block, s/p PPM in place.  - ECHO Takotsubo cardiomyopathy. EF 50-55%, LVH, mild pHTN    - s/p ICU course; Hemodynamically stable now  - cont w/ Metoprolol, ASA and atorvastatin.    # Multiple infectious Disease   - tracheo-bronchitis secondary to citrobacter?? - resolved.  - enterococcal faecalis cellulitis - resolved   - infected left hand hematoma - s/p bedside debridement 12/13 w/ hand surgery   - monitor off abx              # Occlusive Left subclavian thrombosis   - LUE venous duplex : occlusive thrombus in the left mid subclavian vein with partial slow flow detected in the lateral subclavian vein.  - LUE arterial Duplex neg   - cont w/ Eliquis     # DM2   - A1c 8.8%  - cont w/ Lantus   - cont w/ FS monitoring w/ insulin s/s coverage     # HTN, Cad s/p PCI w/ stent/ CABG and HPL.  - cont with ASA, Metoprolol and Atorvastatin     # Anemia of Chronic Disease  - h/h stable, will cont  to monitor   - Hb 10.9, stable.    # Stage 3-4 sacral decubiti wound  - s/p debridement 12/19  -cont w/ wound care as recommended     # Dysphagia/ Moderate Protein Calorie Malnutrition  - Peg tube in place   - continue with tube feeding at goal as tolerated.     # Constipation  - cont w/ senna, Miralax prn     DVT prophylaxis: Eliquis.    Disposition: Patient has no insurance and Family in process of getting guardianship, court date 3/12.

## 2023-03-04 NOTE — PROGRESS NOTE ADULT - SUBJECTIVE AND OBJECTIVE BOX
Patient: BRANDON CAMARILLO [id number] 72y Male                            Hospitalist Attending Note    Son at bedside.    No complaints.     ____________________PHYSICAL EXAM:  GENERAL:  NAD, alert  HEENT: NCAT.  Trach in place.   CARDIOVASCULAR:  S1, S2  LUNGS: CTAB  ABDOMEN:  soft, (-) tenderness, (-) distension, (+) bowel sounds, (-) guarding, (-) rebound (-) rigidity  EXTREMITIES:  no cyanosis / clubbing / edema.   NEURO: strength symmetric.   ____________________     VITALS:  Vital Signs Last 24 Hrs  T(C): 36.9 (04 Mar 2023 11:22), Max: 37.1 (03 Mar 2023 16:58)  T(F): 98.5 (04 Mar 2023 11:22), Max: 98.8 (03 Mar 2023 16:58)  HR: 86 (04 Mar 2023 11:22) (69 - 88)  BP: 104/60 (04 Mar 2023 11:22) (104/60 - 127/68)  BP(mean): 86 (03 Mar 2023 16:58) (86 - 86)  RR: 20 (04 Mar 2023 11:22) (18 - 22)  SpO2: 98% (04 Mar 2023 11:22) (95% - 100%)    Parameters below as of 04 Mar 2023 11:22  Patient On (Oxygen Delivery Method): tracheostomy collar     Daily     Daily   CAPILLARY BLOOD GLUCOSE      POCT Blood Glucose.: 259 mg/dL (04 Mar 2023 11:52)  POCT Blood Glucose.: 107 mg/dL (04 Mar 2023 05:51)  POCT Blood Glucose.: 89 mg/dL (04 Mar 2023 00:16)  POCT Blood Glucose.: 160 mg/dL (03 Mar 2023 21:58)  POCT Blood Glucose.: 82 mg/dL (03 Mar 2023 17:33)    I&O's Summary    03 Mar 2023 07:01  -  04 Mar 2023 07:00  --------------------------------------------------------  IN: 250 mL / OUT: 1 mL / NET: 249 mL    04 Mar 2023 07:01  -  04 Mar 2023 15:43  --------------------------------------------------------  IN: 440 mL / OUT: 0 mL / NET: 440 mL        HISTORY:  PAST MEDICAL & SURGICAL HISTORY:  HTN (hypertension)      HLD (hyperlipidemia)      Diabetes mellitus      Lacunar infarction      BPH (benign prostatic hyperplasia)      CHF (congestive heart failure)      Chronic obstructive pulmonary disease, unspecified COPD type      S/P CABG (coronary artery bypass graft)  2014      Allergies    No Known Allergies    Intolerances       LABS:                        11.6   10.36 )-----------( 200      ( 04 Mar 2023 09:30 )             36.2     03-04    144  |  105  |  20  ----------------------------<  117<H>  3.7   |  32<H>  |  0.74    Ca    8.9      04 Mar 2023 09:30                    MEDICATIONS:  MEDICATIONS  (STANDING):  apixaban 5 milliGRAM(s) Oral every 12 hours  aspirin  chewable 81 milliGRAM(s) Oral daily  atorvastatin 20 milliGRAM(s) Oral at bedtime  bacitracin   Ointment 1 Application(s) Topical two times a day  budesonide 160 MICROgram(s)/formoterol 4.5 MICROgram(s) Inhaler 2 Puff(s) Inhalation two times a day  chlorhexidine 2% Cloths 1 Application(s) Topical <User Schedule>  cyanocobalamin 1000 MICROGram(s) Oral daily  dextrose 5%. 1000 milliLiter(s) (50 mL/Hr) IV Continuous <Continuous>  dextrose 5%. 1000 milliLiter(s) (100 mL/Hr) IV Continuous <Continuous>  dextrose 50% Injectable 25 Gram(s) IV Push once  dextrose 50% Injectable 12.5 Gram(s) IV Push once  dextrose 50% Injectable 25 Gram(s) IV Push once  diphenhydrAMINE Injectable 50 milliGRAM(s) IV Push once  folic acid 1 milliGRAM(s) Oral daily  glucagon  Injectable 1 milliGRAM(s) IntraMuscular once  glycopyrrolate 1 milliGRAM(s) Oral two times a day  insulin glargine Injectable (LANTUS) 10 Unit(s) SubCutaneous at bedtime  insulin lispro (ADMELOG) corrective regimen sliding scale   SubCutaneous every 6 hours  levETIRAcetam  Solution 1500 milliGRAM(s) Oral two times a day  metoprolol tartrate 25 milliGRAM(s) Enteral Tube two times a day  multivitamin/minerals/iron Oral Solution (CENTRUM) 15 milliLiter(s) Enteral Tube daily  pantoprazole   Suspension 40 milliGRAM(s) Oral before breakfast  polyethylene glycol 3350 17 Gram(s) Oral daily  scopolamine 1 mG/72 Hr(s) Patch 1 Patch Transdermal every 72 hours  senna 2 Tablet(s) Oral at bedtime  valproic  acid Syrup 1000 milliGRAM(s) Oral <User Schedule>  valproic  acid Syrup 750 milliGRAM(s) Oral <User Schedule>  vitamin A &amp; D Ointment 1 Application(s) Topical two times a day    MEDICATIONS  (PRN):  acetaminophen     Tablet .. 650 milliGRAM(s) Oral every 6 hours PRN Temp greater or equal to 38C (100.4F), Mild Pain (1 - 3)  albuterol/ipratropium for Nebulization 3 milliLiter(s) Nebulizer every 6 hours PRN Shortness of Breath and/or Wheezing  aluminum hydroxide/magnesium hydroxide/simethicone Suspension 30 milliLiter(s) Oral every 4 hours PRN Dyspepsia  dextrose Oral Gel 15 Gram(s) Oral once PRN Blood Glucose LESS THAN 70 milliGRAM(s)/deciliter  melatonin 3 milliGRAM(s) Oral at bedtime PRN Insomnia  nitroglycerin     SubLingual 0.4 milliGRAM(s) SubLingual every 5 minutes PRN Chest Pain

## 2023-03-04 NOTE — PROGRESS NOTE ADULT - SUBJECTIVE AND OBJECTIVE BOX
BRANDON CAMARILLO    [address] [address]    Allergies    No Known Allergies    Intolerances        PAST MEDICAL & SURGICAL HISTORY:  HTN (hypertension)      HLD (hyperlipidemia)      Diabetes mellitus      Lacunar infarction      BPH (benign prostatic hyperplasia)      CHF (congestive heart failure)      Chronic obstructive pulmonary disease, unspecified COPD type      S/P CABG (coronary artery bypass graft)  2014          FAMILY HISTORY:      Home Medications:  aspirin 81 mg oral delayed release tablet: 1 tab(s) orally once a day (12 Jun 2020 17:35)  Liquifilm Tears preserved ophthalmic solution: 1 drop(s) to each affected eye 2 times a day (12 Jun 2020 17:35)      MEDICATIONS  (STANDING):  apixaban 5 milliGRAM(s) Oral every 12 hours  aspirin  chewable 81 milliGRAM(s) Oral daily  atorvastatin 20 milliGRAM(s) Oral at bedtime  bacitracin   Ointment 1 Application(s) Topical two times a day  budesonide 160 MICROgram(s)/formoterol 4.5 MICROgram(s) Inhaler 2 Puff(s) Inhalation two times a day  chlorhexidine 2% Cloths 1 Application(s) Topical <User Schedule>  cyanocobalamin 1000 MICROGram(s) Oral daily  dextrose 5%. 1000 milliLiter(s) (50 mL/Hr) IV Continuous <Continuous>  dextrose 5%. 1000 milliLiter(s) (100 mL/Hr) IV Continuous <Continuous>  dextrose 50% Injectable 25 Gram(s) IV Push once  dextrose 50% Injectable 12.5 Gram(s) IV Push once  dextrose 50% Injectable 25 Gram(s) IV Push once  diphenhydrAMINE Injectable 50 milliGRAM(s) IV Push once  folic acid 1 milliGRAM(s) Oral daily  glucagon  Injectable 1 milliGRAM(s) IntraMuscular once  glycopyrrolate 1 milliGRAM(s) Oral two times a day  insulin glargine Injectable (LANTUS) 10 Unit(s) SubCutaneous at bedtime  insulin lispro (ADMELOG) corrective regimen sliding scale   SubCutaneous every 6 hours  levETIRAcetam  Solution 1500 milliGRAM(s) Oral two times a day  metoprolol tartrate 25 milliGRAM(s) Enteral Tube two times a day  multivitamin/minerals/iron Oral Solution (CENTRUM) 15 milliLiter(s) Enteral Tube daily  pantoprazole   Suspension 40 milliGRAM(s) Oral before breakfast  polyethylene glycol 3350 17 Gram(s) Oral daily  scopolamine 1 mG/72 Hr(s) Patch 1 Patch Transdermal every 72 hours  senna 2 Tablet(s) Oral at bedtime  valproic  acid Syrup 1000 milliGRAM(s) Oral <User Schedule>  valproic  acid Syrup 750 milliGRAM(s) Oral <User Schedule>  vitamin A &amp; D Ointment 1 Application(s) Topical two times a day    MEDICATIONS  (PRN):  acetaminophen     Tablet .. 650 milliGRAM(s) Oral every 6 hours PRN Temp greater or equal to 38C (100.4F), Mild Pain (1 - 3)  albuterol/ipratropium for Nebulization 3 milliLiter(s) Nebulizer every 6 hours PRN Shortness of Breath and/or Wheezing  aluminum hydroxide/magnesium hydroxide/simethicone Suspension 30 milliLiter(s) Oral every 4 hours PRN Dyspepsia  dextrose Oral Gel 15 Gram(s) Oral once PRN Blood Glucose LESS THAN 70 milliGRAM(s)/deciliter  melatonin 3 milliGRAM(s) Oral at bedtime PRN Insomnia  nitroglycerin     SubLingual 0.4 milliGRAM(s) SubLingual every 5 minutes PRN Chest Pain      Diet, Pureed:   Consistent Carbohydrate Evening Snack  Low Sodium  Lacto-Ovo Veg (Accepts Milk Prod., Eggs)  Tube Feeding Modality: Gastrostomy  Glucerna 1.2 Pedrito  Total Volume for 24 Hours (mL): 720  Intermittent  Until Goal Tube Feed Rate (mL per Hour): 60  Tube Feeding Hours ON: 12  Tube Feeding OFF (Hours): 12  Tube Feed Start Time: 19:00  Liquid Protein Supplement     Qty per Day:  1  Supplement Feeding Modality:  Oral  Glucerna Shake Cans or Servings Per Day:  1       Frequency:  Two Times a day (03-01-23 @ 12:14) [Active]          Vital Signs Last 24 Hrs  T(C): 36.9 (04 Mar 2023 05:52), Max: 37.1 (03 Mar 2023 16:58)  T(F): 98.4 (04 Mar 2023 05:52), Max: 98.8 (03 Mar 2023 16:58)  HR: 69 (04 Mar 2023 09:01) (69 - 88)  BP: 127/68 (04 Mar 2023 05:52) (105/70 - 128/83)  BP(mean): 86 (03 Mar 2023 16:58) (86 - 86)  RR: 20 (04 Mar 2023 09:01) (18 - 22)  SpO2: 100% (04 Mar 2023 09:01) (95% - 100%)    Parameters below as of 04 Mar 2023 09:01  Patient On (Oxygen Delivery Method): nasal cannula,3lpm          03-03-23 @ 07:01  -  03-04-23 @ 07:00  --------------------------------------------------------  IN: 250 mL / OUT: 1 mL / NET: 249 mL              LABS:                        11.6   10.36 )-----------( 200      ( 04 Mar 2023 09:30 )             36.2                     WBC:  WBC Count: 10.36 K/uL (03-04 @ 09:30)  WBC Count: 8.72 K/uL (03-02 @ 08:20)      MICROBIOLOGY:  RECENT CULTURES:                  Sodium:  Sodium, Serum: 140 mmol/L (03-02 @ 08:20)      0.76 mg/dL 03-02 @ 08:20      Hemoglobin:  Hemoglobin: 11.6 g/dL (03-04 @ 09:30)  Hemoglobin: 10.8 g/dL (03-02 @ 08:20)      Platelets: Platelet Count - Automated: 200 K/uL (03-04 @ 09:30)  Platelet Count - Automated: 201 K/uL (03-02 @ 08:20)              RADIOLOGY & ADDITIONAL STUDIES:      MICROBIOLOGY:  RECENT CULTURES:

## 2023-03-04 NOTE — PROGRESS NOTE ADULT - ASSESSMENT
GENERAL DATA .   [facility].  12/11/2022 full code       ALLGY.  nka                            WT. ..  12/2/2022 86  BMI. ..    12/2/2022 29                          ICU STAY.  .. 11/29-12/9  COVID.   .. 12/2/2022 scv2 (-)   .. 11/20/2022 scv2 (-)   BEST PRACTICE ISSUES.    HOB ELEVATN. Yes  DVT PPLX.    .. 1/3/2023 apixa 5.2 (vte)    (DVT 12/12 l Subclav throm)  ALEJO PPLX.   INFN PPLX.   .. 11/14 chlorhex 2%   SP SW ANTWAN.         DIET.    ..  12/15 glucerna 1.2 1440 gt   IV fl.    PROCEDURES.  .. 2/15/2023 pmvaslve placd   .. 1/28  size 4 fenestrated cuffed trach placed by surgery   ABGS.  1/6/2023 ac 16/450/.3/5 746/49/123     VS/ PO/IO/ VENT/ DRIPS.  3/4/2023 afeb 70 120/60   3/4/2023 tc 28% 96%    PATIENT PRESENTATION.  72 m doa 11/14/2022 cac    PMH  PMH CAD s/p PCI with stent 2015 and CABG 2014,   PMH  s/p medtronic PPM,   PMH CVA (lacunar infarct)    HOSPITAL COURSE   .. 1) PEA arrest with ROSC after approximately 5 min 11/14  Now communicative   .. 2) DVT 12/12 l Subclav thromS 12/15/2022 lvnx 80.2 1/3 changed to apixaban 5.2  .. 3) TRACH 12/5/2022 trach  .. 4) PEG12/5/2022 peg   .. 5) Cellulitis hand absc 12/13 mod enterococcus fecalis  staph epi 12/12-12/15/2022  cephalexin 12/15 vanco once  12/16-12/19 zosyn  .. 6) Vent weaning       PROBLEM ASSESSMENT RECOMMENDATIONS.  Trach 12/5   .. trach care   Trach change  ..  1/28 size 4 fenestrated cuffed trach  Trach wean.  .. 2/6/2023 pt still has lot of secretions  .. 2/27/2023 dw nurs Pt still needing lot of suctioning   .. 2/28/2023 needed lesser suctioning   .. 3/4/2023 case dw Dr Barrett Will consider decannulation 3/6 or 3/7   .. 3/4/2023 Dr Barrett ICU made aware in case he needs reintubation   VTE  .. On apixaba  INFECTION.  .. w 1/17-1/18-1/20-1/21-2/3-2/8-2/10-2/15-2/27/2023      w 8.3- 7.9 - 6.8 - 7.2 - 8.8  - 10- 10.8- 10.8-7.9    .. pr 1/17-1/20/2023 (-)  (-)   .. 1/16-1/20/2023 Rocephin started by hospitalist dced   CAD.  .. 11/14 asa 81   .. 12/13 metoprolol 25.2   CHF   .. Cr 2/6/2023 Cr .7   .. 11/14/2022 echo mod decr segmental lvsf apical lateral apiccal ant segment are abn takotsubo cmpthy pasp 42 .  .. Monitor for chf has chr hfref per echo   COPD   .. 2/1 duoneb p   .. 12/30 symbicort 160   .. 1/7/2023 glycopyrrolate 1.2   .. 1/9/2023 ipratropium  .. 1/15/2023 scopolamine   .. continue bd ics for copd   Anemia.  .. Hb 1/12-1/14-1/19-1/20-7/21-1/25-2/3-2/8-2/10-2/15-2/27/2023       Hb 9.8- 10 -9.1- 9.8  - 9.2 - 9.5 -10- 10 - 10.5 -10.8- 11   .. target hb 7 (+)  .. monitor   sp cac   .. good neuro recovery awake alert interactive   VTE  .. 1/3/2023 apixa 5.2 (vte)    FAMILY COMMUNICATION.  .. 2/12/2023 dw pt son  will ask speech eval for passey cordelia valve  .. 2/12/2023 Pt has a fenestrated trach and has fenestrated inner cannula which is available to place before trying passy cordelia valve     OVERALL   WEANED OFF VENT 1/23   TRACH WEANING    Will consider decannulation when secretions decrease  2/14/2023 dw speech they will try PM valve with fenestrated cannula   2/16/2023 tolerating pm valve  PASSY CORDELIA VALVE   .. 2/15/2023 placed pmv during day time  REHAB 2/16/2023 pt eval requested   FLAILING OF LOWER EXTR   .. Jersks started 1/16   .. 1/19/2023 myoclonic jerks improvd on depakote       .   TIME SPENT   Over 25 minutes aggregate care time spent on encounter; activities included   direct patient care, counseling and/or coordinating care reviewing notes, lab data/ imaging , discussion with multidisciplinary team/ patient  /family and explaining in detail risks, benefits, alternatives  of the recommendations     BRANDON CAMARILLO

## 2023-03-05 LAB
GLUCOSE BLDC GLUCOMTR-MCNC: 128 MG/DL — HIGH (ref 70–99)
GLUCOSE BLDC GLUCOMTR-MCNC: 147 MG/DL — HIGH (ref 70–99)
GLUCOSE BLDC GLUCOMTR-MCNC: 187 MG/DL — HIGH (ref 70–99)
GLUCOSE BLDC GLUCOMTR-MCNC: 216 MG/DL — HIGH (ref 70–99)
GLUCOSE BLDC GLUCOMTR-MCNC: 84 MG/DL — SIGNIFICANT CHANGE UP (ref 70–99)

## 2023-03-05 PROCEDURE — 99232 SBSQ HOSP IP/OBS MODERATE 35: CPT

## 2023-03-05 RX ADMIN — POLYETHYLENE GLYCOL 3350 17 GRAM(S): 17 POWDER, FOR SOLUTION ORAL at 12:08

## 2023-03-05 RX ADMIN — INSULIN GLARGINE 10 UNIT(S): 100 INJECTION, SOLUTION SUBCUTANEOUS at 22:02

## 2023-03-05 RX ADMIN — Medication 1 APPLICATION(S): at 18:01

## 2023-03-05 RX ADMIN — BUDESONIDE AND FORMOTEROL FUMARATE DIHYDRATE 2 PUFF(S): 160; 4.5 AEROSOL RESPIRATORY (INHALATION) at 17:18

## 2023-03-05 RX ADMIN — APIXABAN 5 MILLIGRAM(S): 2.5 TABLET, FILM COATED ORAL at 06:03

## 2023-03-05 RX ADMIN — SCOPALAMINE 1 PATCH: 1 PATCH, EXTENDED RELEASE TRANSDERMAL at 00:41

## 2023-03-05 RX ADMIN — CHLORHEXIDINE GLUCONATE 1 APPLICATION(S): 213 SOLUTION TOPICAL at 06:10

## 2023-03-05 RX ADMIN — SCOPALAMINE 1 PATCH: 1 PATCH, EXTENDED RELEASE TRANSDERMAL at 17:39

## 2023-03-05 RX ADMIN — Medication 15 MILLILITER(S): at 12:07

## 2023-03-05 RX ADMIN — SENNA PLUS 2 TABLET(S): 8.6 TABLET ORAL at 22:02

## 2023-03-05 RX ADMIN — Medication 750 MILLIGRAM(S): at 07:27

## 2023-03-05 RX ADMIN — Medication 81 MILLIGRAM(S): at 12:07

## 2023-03-05 RX ADMIN — Medication 1 MILLIGRAM(S): at 12:07

## 2023-03-05 RX ADMIN — ATORVASTATIN CALCIUM 20 MILLIGRAM(S): 80 TABLET, FILM COATED ORAL at 22:02

## 2023-03-05 RX ADMIN — SCOPALAMINE 1 PATCH: 1 PATCH, EXTENDED RELEASE TRANSDERMAL at 21:50

## 2023-03-05 RX ADMIN — Medication 1000 MILLIGRAM(S): at 20:20

## 2023-03-05 RX ADMIN — Medication 1 APPLICATION(S): at 06:12

## 2023-03-05 RX ADMIN — SCOPALAMINE 1 PATCH: 1 PATCH, EXTENDED RELEASE TRANSDERMAL at 00:48

## 2023-03-05 RX ADMIN — Medication 1: at 12:06

## 2023-03-05 RX ADMIN — ROBINUL 1 MILLIGRAM(S): 0.2 INJECTION INTRAMUSCULAR; INTRAVENOUS at 06:03

## 2023-03-05 RX ADMIN — PANTOPRAZOLE SODIUM 40 MILLIGRAM(S): 20 TABLET, DELAYED RELEASE ORAL at 06:03

## 2023-03-05 RX ADMIN — BUDESONIDE AND FORMOTEROL FUMARATE DIHYDRATE 2 PUFF(S): 160; 4.5 AEROSOL RESPIRATORY (INHALATION) at 05:04

## 2023-03-05 RX ADMIN — LEVETIRACETAM 1500 MILLIGRAM(S): 250 TABLET, FILM COATED ORAL at 06:03

## 2023-03-05 RX ADMIN — LEVETIRACETAM 1500 MILLIGRAM(S): 250 TABLET, FILM COATED ORAL at 17:44

## 2023-03-05 RX ADMIN — ROBINUL 1 MILLIGRAM(S): 0.2 INJECTION INTRAMUSCULAR; INTRAVENOUS at 17:45

## 2023-03-05 RX ADMIN — PREGABALIN 1000 MICROGRAM(S): 225 CAPSULE ORAL at 12:07

## 2023-03-05 RX ADMIN — Medication 25 MILLIGRAM(S): at 17:44

## 2023-03-05 RX ADMIN — Medication 25 MILLIGRAM(S): at 06:04

## 2023-03-05 RX ADMIN — APIXABAN 5 MILLIGRAM(S): 2.5 TABLET, FILM COATED ORAL at 17:44

## 2023-03-05 NOTE — PROGRESS NOTE ADULT - ASSESSMENT
REVIEW OF SYMPTOMS      Able to give (reliable) ROS  NO     PHYSICAL EXAM    HEENT Unremarkable  atraumatic   RESP Fair air entry EXP prolonged    Harsh breath sound Resp distres mild   CARDIAC S1 S2 No S3     NO JVD    ABDOMEN SOFT BS PRESENT NOT DISTENDED No hepatosplenomegaly   PEDAL EDEMA present No calf tenderness  NO rash       GENERAL DATA .   [facility].  12/11/2022 full code       ALLGY.  nka                            WT. ..  12/2/2022 86  BMI. ..    12/2/2022 29                          ICU STAY.  .. 11/29-12/9  COVID.   .. 12/2/2022 scv2 (-)   .. 11/20/2022 scv2 (-)   BEST PRACTICE ISSUES.    HOB ELEVATN. Yes  DVT PPLX.    .. 1/3/2023 apixa 5.2 (vte)    (DVT 12/12 l Subclav throm)  ALEJO PPLX.   INFN PPLX.   .. 11/14 chlorhex 2%   SP SW ANTWAN.         DIET.    ..  12/15 glucerna 1.2 1440 gt   IV fl.    PROCEDURES.  .. 2/15/2023 pmvaslve placd   .. 1/28  size 4 fenestrated cuffed trach placed by surgery   .. 12/19 debridement of sacral wound   .. 12/12 r arm midline   .. 12/5/2022 trach  .. 12/5/2022 peg       ABGS.  1/6/2023 ac 16/450/.3/5 746/49/123     VS/ PO/IO/ VENT/ DRIPS.  3/5/2023 afeb 87 110/60   3/5/2023 tc 97%     PATIENT PRESENTATION.  72 m doa 11/14/2022 cac    PMH  PMH CAD s/p PCI with stent 2015 and CABG 2014,   PMH  s/p medtronic PPM,   PMH CVA (lacunar infarct)    HOSPITAL COURSE   .. 1) PEA arrest with ROSC after approximately 5 min 11/14  Now communicative   .. 2) DVT 12/12 l Subclav thromS 12/15/2022 lvnx 80.2 1/3 changed to apixaban 5.2  .. 3) TRACH 12/5/2022 trach  .. 4) PEG12/5/2022 peg   .. 5) Cellulitis hand absc 12/13 mod enterococcus fecalis  staph epi 12/12-12/15/2022  cephalexin 12/15 vanco once  12/16-12/19 zosyn  .. 6) Vent weaning       PROBLEM ASSESSMENT RECOMMENDATIONS.  Trach 12/5   .. trach care   Trach change  ..  1/28 size 4 fenestrated cuffed trach  Trach wean.  .. 2/6/2023 pt still has lot of secretions  .. 2/27/2023 dw nurs Pt still needing lot of suctioning   .. 2/28/2023 needed lesser suctioning   .. 3/4/2023 case dw Dr Barrett Will consider decannulation 3/6 or 3/7   .. 3/4/2023 Dr Barrett ICU made aware in case he needs reintubation She will be around next week if we plan decannulation   .. 3/5/2023 dw son he wants me to try decannulation tuesday as he can be by bedside Son does understand rbaa of decannulation including risk that he may need reiuntunbation get mucus plugging etc but also realizes the risk ofpneumonia etc with trach if we do not decannulate   .. 3/5/2023 dw rt Bethany Pt secretions and minimal and pt has been tolerating capping duringf day  .. 3/5/2023 will try capping overnight   .. 3/5/2023 son concerned that he has sleep apena so will order cpap 12 cm   .. 3/5/2023 pt to be kept on cardiac monitor while trach weaning being done   SLEEP APNEA.  .. 3/5/2023 cpap ordered  VTE  .. On apixaba  INFECTION.  .. w 1/17-1/18-1/20-1/21-2/3-2/8-2/10-2/15-2/27/2023      w 8.3- 7.9 - 6.8 - 7.2 - 8.8  - 10- 10.8- 10.8-7.9    .. pr 1/17-1/20/2023 (-)  (-)   .. 1/16-1/20/2023 Rocephin started by hospitalist luke   CAD.  .. 11/14 asa 81   .. 12/13 metoprolol 25.2   CHF   .. Cr 2/6/2023 Cr .7   .. 11/14/2022 echo mod decr segmental lvsf apical lateral apiccal ant segment are abn takotsubo cmpthy pasp 42 .  .. Monitor for chf has chr hfref per echo   COPD   .. 2/1 duoneb p   .. 12/30 symbicort 160   .. 1/7/2023 glycopyrrolate 1.2   .. 1/9/2023 ipratropium  .. 1/15/2023 scopolamine   .. continue bd ics for copd   Anemia.  .. Hb 1/12-1/14-1/19-1/20-7/21-1/25-2/3-2/8-2/10-2/15-2/27/2023       Hb 9.8- 10 -9.1- 9.8  - 9.2 - 9.5 -10- 10 - 10.5 -10.8- 11   .. target hb 7 (+)  .. monitor   sp cac   .. good neuro recovery awake alert interactive   VTE  .. 1/3/2023 apixa 5.2 (vte)    FAMILY COMMUNICATION.  .. 2/12/2023 dw pt son  will ask speech eval for passey cordelia valve  .. 2/12/2023 Pt has a fenestrated trach and has fenestrated inner cannula which is available to place before trying passy cordelia valve     OVERALL   WEANED OFF VENT 1/23   TRACH WEANING    Will consider decannulation when secretions decrease  2/14/2023 dw speech they will try PM valve with fenestrated cannula   2/16/2023 tolerating pm valve  PASSY CORDELIA VALVE   .. 2/15/2023 placed pmv during day time  REHAB 2/16/2023 pt eval requested   FLAILING OF LOWER EXTR   .. Jersks started 1/16   .. 1/19/2023 myoclonic jerks improvd on depakote       TIME SPENT   Over 25 minutes aggregate care time spent on encounter; activities included   direct patient care, counseling and/or coordinating care reviewing notes, lab data/ imaging , discussion with multidisciplinary team/ patient  /family and explaining in detail risks, benefits, alternatives  of the recommendations     BRANDON CAMARILLO

## 2023-03-05 NOTE — PROGRESS NOTE ADULT - SUBJECTIVE AND OBJECTIVE BOX
Patient: BRANDON CAMARILLO [id number] 72y Male                            Hospitalist Attending Note    Son at bedside.    No complaints.     ____________________PHYSICAL EXAM:  GENERAL:  NAD, alert  HEENT: NCAT.  Trach in place.   CARDIOVASCULAR:  S1, S2  LUNGS: CTAB  ABDOMEN:  soft, (-) tenderness, (-) distension, (+) bowel sounds, (-) guarding, (-) rebound (-) rigidity  EXTREMITIES:  no cyanosis / clubbing / edema.   NEURO: strength symmetric.   ____________________    VITALS:  Vital Signs Last 24 Hrs  T(C): 36.9 (05 Mar 2023 16:46), Max: 37.1 (05 Mar 2023 11:43)  T(F): 98.5 (05 Mar 2023 16:46), Max: 98.7 (05 Mar 2023 11:43)  HR: 72 (05 Mar 2023 16:46) (68 - 87)  BP: 132/77 (05 Mar 2023 16:46) (115/62 - 144/78)  BP(mean): 95 (05 Mar 2023 16:46) (95 - 95)  RR: 17 (05 Mar 2023 16:46) (17 - 20)  SpO2: 98% (05 Mar 2023 16:46) (97% - 100%)    Parameters below as of 05 Mar 2023 16:46  Patient On (Oxygen Delivery Method): room air     Daily     Daily   CAPILLARY BLOOD GLUCOSE      POCT Blood Glucose.: 84 mg/dL (05 Mar 2023 17:18)  POCT Blood Glucose.: 187 mg/dL (05 Mar 2023 11:53)  POCT Blood Glucose.: 128 mg/dL (05 Mar 2023 05:57)  POCT Blood Glucose.: 147 mg/dL (05 Mar 2023 00:44)  POCT Blood Glucose.: 242 mg/dL (04 Mar 2023 22:09)    I&O's Summary    04 Mar 2023 07:01  -  05 Mar 2023 07:00  --------------------------------------------------------  IN: 440 mL / OUT: 0 mL / NET: 440 mL        LABS:                        11.6   10.36 )-----------( 200      ( 04 Mar 2023 09:30 )             36.2     03-04    144  |  105  |  20  ----------------------------<  117<H>  3.7   |  32<H>  |  0.74    Ca    8.9      04 Mar 2023 09:30                    MEDICATIONS:  acetaminophen     Tablet .. 650 milliGRAM(s) Oral every 6 hours PRN  albuterol/ipratropium for Nebulization 3 milliLiter(s) Nebulizer every 6 hours PRN  aluminum hydroxide/magnesium hydroxide/simethicone Suspension 30 milliLiter(s) Oral every 4 hours PRN  apixaban 5 milliGRAM(s) Oral every 12 hours  aspirin  chewable 81 milliGRAM(s) Oral daily  atorvastatin 20 milliGRAM(s) Oral at bedtime  bacitracin   Ointment 1 Application(s) Topical two times a day  budesonide 160 MICROgram(s)/formoterol 4.5 MICROgram(s) Inhaler 2 Puff(s) Inhalation two times a day  chlorhexidine 2% Cloths 1 Application(s) Topical <User Schedule>  cyanocobalamin 1000 MICROGram(s) Oral daily  dextrose 5%. 1000 milliLiter(s) IV Continuous <Continuous>  dextrose 5%. 1000 milliLiter(s) IV Continuous <Continuous>  dextrose 50% Injectable 25 Gram(s) IV Push once  dextrose 50% Injectable 12.5 Gram(s) IV Push once  dextrose 50% Injectable 25 Gram(s) IV Push once  dextrose Oral Gel 15 Gram(s) Oral once PRN  diphenhydrAMINE Injectable 50 milliGRAM(s) IV Push once  folic acid 1 milliGRAM(s) Oral daily  glucagon  Injectable 1 milliGRAM(s) IntraMuscular once  glycopyrrolate 1 milliGRAM(s) Oral two times a day  insulin glargine Injectable (LANTUS) 10 Unit(s) SubCutaneous at bedtime  insulin lispro (ADMELOG) corrective regimen sliding scale   SubCutaneous every 6 hours  levETIRAcetam  Solution 1500 milliGRAM(s) Oral two times a day  melatonin 3 milliGRAM(s) Oral at bedtime PRN  metoprolol tartrate 25 milliGRAM(s) Enteral Tube two times a day  multivitamin/minerals/iron Oral Solution (CENTRUM) 15 milliLiter(s) Enteral Tube daily  nitroglycerin     SubLingual 0.4 milliGRAM(s) SubLingual every 5 minutes PRN  pantoprazole   Suspension 40 milliGRAM(s) Oral before breakfast  polyethylene glycol 3350 17 Gram(s) Oral daily  scopolamine 1 mG/72 Hr(s) Patch 1 Patch Transdermal every 72 hours  senna 2 Tablet(s) Oral at bedtime  valproic  acid Syrup 1000 milliGRAM(s) Oral <User Schedule>  valproic  acid Syrup 750 milliGRAM(s) Oral <User Schedule>  vitamin A &amp; D Ointment 1 Application(s) Topical two times a day

## 2023-03-05 NOTE — PROGRESS NOTE ADULT - SUBJECTIVE AND OBJECTIVE BOX
BRANDON CAMARILLO    [facility] [address] [address]    Allergies    No Known Allergies    Intolerances        PAST MEDICAL & SURGICAL HISTORY:  HTN (hypertension)      HLD (hyperlipidemia)      Diabetes mellitus      Lacunar infarction      BPH (benign prostatic hyperplasia)      CHF (congestive heart failure)      Chronic obstructive pulmonary disease, unspecified COPD type      S/P CABG (coronary artery bypass graft)  2014          FAMILY HISTORY:      Home Medications:  aspirin 81 mg oral delayed release tablet: 1 tab(s) orally once a day (12 Jun 2020 17:35)  Liquifilm Tears preserved ophthalmic solution: 1 drop(s) to each affected eye 2 times a day (12 Jun 2020 17:35)      MEDICATIONS  (STANDING):  apixaban 5 milliGRAM(s) Oral every 12 hours  aspirin  chewable 81 milliGRAM(s) Oral daily  atorvastatin 20 milliGRAM(s) Oral at bedtime  bacitracin   Ointment 1 Application(s) Topical two times a day  budesonide 160 MICROgram(s)/formoterol 4.5 MICROgram(s) Inhaler 2 Puff(s) Inhalation two times a day  chlorhexidine 2% Cloths 1 Application(s) Topical <User Schedule>  cyanocobalamin 1000 MICROGram(s) Oral daily  dextrose 5%. 1000 milliLiter(s) (50 mL/Hr) IV Continuous <Continuous>  dextrose 5%. 1000 milliLiter(s) (100 mL/Hr) IV Continuous <Continuous>  dextrose 50% Injectable 25 Gram(s) IV Push once  dextrose 50% Injectable 12.5 Gram(s) IV Push once  dextrose 50% Injectable 25 Gram(s) IV Push once  diphenhydrAMINE Injectable 50 milliGRAM(s) IV Push once  folic acid 1 milliGRAM(s) Oral daily  glucagon  Injectable 1 milliGRAM(s) IntraMuscular once  glycopyrrolate 1 milliGRAM(s) Oral two times a day  insulin glargine Injectable (LANTUS) 10 Unit(s) SubCutaneous at bedtime  insulin lispro (ADMELOG) corrective regimen sliding scale   SubCutaneous every 6 hours  levETIRAcetam  Solution 1500 milliGRAM(s) Oral two times a day  metoprolol tartrate 25 milliGRAM(s) Enteral Tube two times a day  multivitamin/minerals/iron Oral Solution (CENTRUM) 15 milliLiter(s) Enteral Tube daily  pantoprazole   Suspension 40 milliGRAM(s) Oral before breakfast  polyethylene glycol 3350 17 Gram(s) Oral daily  scopolamine 1 mG/72 Hr(s) Patch 1 Patch Transdermal every 72 hours  senna 2 Tablet(s) Oral at bedtime  valproic  acid Syrup 750 milliGRAM(s) Oral <User Schedule>  valproic  acid Syrup 1000 milliGRAM(s) Oral <User Schedule>  vitamin A &amp; D Ointment 1 Application(s) Topical two times a day    MEDICATIONS  (PRN):  acetaminophen     Tablet .. 650 milliGRAM(s) Oral every 6 hours PRN Temp greater or equal to 38C (100.4F), Mild Pain (1 - 3)  albuterol/ipratropium for Nebulization 3 milliLiter(s) Nebulizer every 6 hours PRN Shortness of Breath and/or Wheezing  aluminum hydroxide/magnesium hydroxide/simethicone Suspension 30 milliLiter(s) Oral every 4 hours PRN Dyspepsia  dextrose Oral Gel 15 Gram(s) Oral once PRN Blood Glucose LESS THAN 70 milliGRAM(s)/deciliter  melatonin 3 milliGRAM(s) Oral at bedtime PRN Insomnia  nitroglycerin     SubLingual 0.4 milliGRAM(s) SubLingual every 5 minutes PRN Chest Pain      Diet, Pureed:   Consistent Carbohydrate Evening Snack  Low Sodium  Lacto-Ovo Veg (Accepts Milk Prod., Eggs)  Tube Feeding Modality: Gastrostomy  Glucerna 1.2 Pedrito  Total Volume for 24 Hours (mL): 720  Intermittent  Until Goal Tube Feed Rate (mL per Hour): 60  Tube Feeding Hours ON: 12  Tube Feeding OFF (Hours): 12  Tube Feed Start Time: 19:00  Liquid Protein Supplement     Qty per Day:  1  Supplement Feeding Modality:  Oral  Glucerna Shake Cans or Servings Per Day:  1       Frequency:  Two Times a day (03-01-23 @ 12:14) [Active]          Vital Signs Last 24 Hrs  T(C): 37 (05 Mar 2023 04:55), Max: 37 (05 Mar 2023 04:55)  T(F): 98.6 (05 Mar 2023 04:55), Max: 98.6 (05 Mar 2023 04:55)  HR: 69 (05 Mar 2023 05:59) (65 - 86)  BP: 131/78 (05 Mar 2023 04:55) (104/60 - 144/78)  BP(mean): 92 (04 Mar 2023 17:16) (92 - 92)  RR: 20 (05 Mar 2023 05:59) (18 - 20)  SpO2: 98% (05 Mar 2023 05:59) (97% - 100%)    Parameters below as of 05 Mar 2023 05:59  Patient On (Oxygen Delivery Method): tracheostomy collar  O2 Flow (L/min): 6  O2 Concentration (%): 28      03-04-23 @ 07:01  -  03-05-23 @ 07:00  --------------------------------------------------------  IN: 440 mL / OUT: 0 mL / NET: 440 mL              LABS:                        11.6   10.36 )-----------( 200      ( 04 Mar 2023 09:30 )             36.2     03-04    144  |  105  |  20  ----------------------------<  117<H>  3.7   |  32<H>  |  0.74    Ca    8.9      04 Mar 2023 09:30                WBC:  WBC Count: 10.36 K/uL (03-04 @ 09:30)  WBC Count: 8.72 K/uL (03-02 @ 08:20)      MICROBIOLOGY:  RECENT CULTURES:                  Sodium:  Sodium, Serum: 144 mmol/L (03-04 @ 09:30)  Sodium, Serum: 140 mmol/L (03-02 @ 08:20)      0.74 mg/dL 03-04 @ 09:30  0.76 mg/dL 03-02 @ 08:20      Hemoglobin:  Hemoglobin: 11.6 g/dL (03-04 @ 09:30)  Hemoglobin: 10.8 g/dL (03-02 @ 08:20)      Platelets: Platelet Count - Automated: 200 K/uL (03-04 @ 09:30)  Platelet Count - Automated: 201 K/uL (03-02 @ 08:20)              RADIOLOGY & ADDITIONAL STUDIES:      MICROBIOLOGY:  RECENT CULTURES:

## 2023-03-06 LAB
ANION GAP SERPL CALC-SCNC: 3 MMOL/L — LOW (ref 5–17)
BUN SERPL-MCNC: 21 MG/DL — SIGNIFICANT CHANGE UP (ref 7–23)
CALCIUM SERPL-MCNC: 8.8 MG/DL — SIGNIFICANT CHANGE UP (ref 8.5–10.1)
CHLORIDE SERPL-SCNC: 104 MMOL/L — SIGNIFICANT CHANGE UP (ref 96–108)
CO2 SERPL-SCNC: 34 MMOL/L — HIGH (ref 22–31)
CREAT SERPL-MCNC: 0.76 MG/DL — SIGNIFICANT CHANGE UP (ref 0.5–1.3)
EGFR: 94 ML/MIN/1.73M2 — SIGNIFICANT CHANGE UP
GLUCOSE BLDC GLUCOMTR-MCNC: 118 MG/DL — HIGH (ref 70–99)
GLUCOSE BLDC GLUCOMTR-MCNC: 138 MG/DL — HIGH (ref 70–99)
GLUCOSE BLDC GLUCOMTR-MCNC: 140 MG/DL — HIGH (ref 70–99)
GLUCOSE BLDC GLUCOMTR-MCNC: 148 MG/DL — HIGH (ref 70–99)
GLUCOSE BLDC GLUCOMTR-MCNC: 161 MG/DL — HIGH (ref 70–99)
GLUCOSE BLDC GLUCOMTR-MCNC: 172 MG/DL — HIGH (ref 70–99)
GLUCOSE SERPL-MCNC: 173 MG/DL — HIGH (ref 70–99)
HCT VFR BLD CALC: 34.6 % — LOW (ref 39–50)
HGB BLD-MCNC: 11.2 G/DL — LOW (ref 13–17)
MCHC RBC-ENTMCNC: 30.4 PG — SIGNIFICANT CHANGE UP (ref 27–34)
MCHC RBC-ENTMCNC: 32.4 G/DL — SIGNIFICANT CHANGE UP (ref 32–36)
MCV RBC AUTO: 93.8 FL — SIGNIFICANT CHANGE UP (ref 80–100)
NRBC # BLD: 0 /100 WBCS — SIGNIFICANT CHANGE UP (ref 0–0)
PLATELET # BLD AUTO: 191 K/UL — SIGNIFICANT CHANGE UP (ref 150–400)
POTASSIUM SERPL-MCNC: 3.8 MMOL/L — SIGNIFICANT CHANGE UP (ref 3.5–5.3)
POTASSIUM SERPL-SCNC: 3.8 MMOL/L — SIGNIFICANT CHANGE UP (ref 3.5–5.3)
RBC # BLD: 3.69 M/UL — LOW (ref 4.2–5.8)
RBC # FLD: 14.6 % — HIGH (ref 10.3–14.5)
SODIUM SERPL-SCNC: 141 MMOL/L — SIGNIFICANT CHANGE UP (ref 135–145)
WBC # BLD: 11.2 K/UL — HIGH (ref 3.8–10.5)
WBC # FLD AUTO: 11.2 K/UL — HIGH (ref 3.8–10.5)

## 2023-03-06 PROCEDURE — 99232 SBSQ HOSP IP/OBS MODERATE 35: CPT

## 2023-03-06 RX ADMIN — Medication 25 MILLIGRAM(S): at 05:58

## 2023-03-06 RX ADMIN — Medication 1000 MILLIGRAM(S): at 21:47

## 2023-03-06 RX ADMIN — SCOPALAMINE 1 PATCH: 1 PATCH, EXTENDED RELEASE TRANSDERMAL at 07:25

## 2023-03-06 RX ADMIN — ROBINUL 1 MILLIGRAM(S): 0.2 INJECTION INTRAMUSCULAR; INTRAVENOUS at 05:59

## 2023-03-06 RX ADMIN — PREGABALIN 1000 MICROGRAM(S): 225 CAPSULE ORAL at 11:23

## 2023-03-06 RX ADMIN — Medication 1 MILLIGRAM(S): at 11:58

## 2023-03-06 RX ADMIN — INSULIN GLARGINE 10 UNIT(S): 100 INJECTION, SOLUTION SUBCUTANEOUS at 23:06

## 2023-03-06 RX ADMIN — Medication 1 APPLICATION(S): at 17:34

## 2023-03-06 RX ADMIN — Medication 1 APPLICATION(S): at 06:21

## 2023-03-06 RX ADMIN — Medication 750 MILLIGRAM(S): at 07:54

## 2023-03-06 RX ADMIN — Medication 1: at 17:19

## 2023-03-06 RX ADMIN — BUDESONIDE AND FORMOTEROL FUMARATE DIHYDRATE 2 PUFF(S): 160; 4.5 AEROSOL RESPIRATORY (INHALATION) at 06:00

## 2023-03-06 RX ADMIN — SCOPALAMINE 1 PATCH: 1 PATCH, EXTENDED RELEASE TRANSDERMAL at 19:30

## 2023-03-06 RX ADMIN — LEVETIRACETAM 1500 MILLIGRAM(S): 250 TABLET, FILM COATED ORAL at 17:34

## 2023-03-06 RX ADMIN — Medication 81 MILLIGRAM(S): at 11:58

## 2023-03-06 RX ADMIN — ROBINUL 1 MILLIGRAM(S): 0.2 INJECTION INTRAMUSCULAR; INTRAVENOUS at 17:37

## 2023-03-06 RX ADMIN — SENNA PLUS 2 TABLET(S): 8.6 TABLET ORAL at 21:48

## 2023-03-06 RX ADMIN — Medication 1 APPLICATION(S): at 19:21

## 2023-03-06 RX ADMIN — LEVETIRACETAM 1500 MILLIGRAM(S): 250 TABLET, FILM COATED ORAL at 05:58

## 2023-03-06 RX ADMIN — Medication 25 MILLIGRAM(S): at 17:38

## 2023-03-06 RX ADMIN — CHLORHEXIDINE GLUCONATE 1 APPLICATION(S): 213 SOLUTION TOPICAL at 05:58

## 2023-03-06 RX ADMIN — PANTOPRAZOLE SODIUM 40 MILLIGRAM(S): 20 TABLET, DELAYED RELEASE ORAL at 06:05

## 2023-03-06 RX ADMIN — APIXABAN 5 MILLIGRAM(S): 2.5 TABLET, FILM COATED ORAL at 17:35

## 2023-03-06 RX ADMIN — POLYETHYLENE GLYCOL 3350 17 GRAM(S): 17 POWDER, FOR SOLUTION ORAL at 11:24

## 2023-03-06 RX ADMIN — APIXABAN 5 MILLIGRAM(S): 2.5 TABLET, FILM COATED ORAL at 05:59

## 2023-03-06 RX ADMIN — Medication 15 MILLILITER(S): at 14:02

## 2023-03-06 RX ADMIN — ATORVASTATIN CALCIUM 20 MILLIGRAM(S): 80 TABLET, FILM COATED ORAL at 21:49

## 2023-03-06 NOTE — PROGRESS NOTE ADULT - SUBJECTIVE AND OBJECTIVE BOX
BRANDON CAMARILLO    [facility] [address]    Allergies    No Known Allergies    Intolerances        PAST MEDICAL & SURGICAL HISTORY:  HTN (hypertension)      HLD (hyperlipidemia)      Diabetes mellitus      Lacunar infarction      BPH (benign prostatic hyperplasia)      CHF (congestive heart failure)      Chronic obstructive pulmonary disease, unspecified COPD type      S/P CABG (coronary artery bypass graft)  2014          FAMILY HISTORY:      Home Medications:  aspirin 81 mg oral delayed release tablet: 1 tab(s) orally once a day (12 Jun 2020 17:35)  Liquifilm Tears preserved ophthalmic solution: 1 drop(s) to each affected eye 2 times a day (12 Jun 2020 17:35)      MEDICATIONS  (STANDING):  apixaban 5 milliGRAM(s) Oral every 12 hours  aspirin  chewable 81 milliGRAM(s) Oral daily  atorvastatin 20 milliGRAM(s) Oral at bedtime  bacitracin   Ointment 1 Application(s) Topical two times a day  budesonide 160 MICROgram(s)/formoterol 4.5 MICROgram(s) Inhaler 2 Puff(s) Inhalation two times a day  chlorhexidine 2% Cloths 1 Application(s) Topical <User Schedule>  cyanocobalamin 1000 MICROGram(s) Oral daily  dextrose 5%. 1000 milliLiter(s) (100 mL/Hr) IV Continuous <Continuous>  dextrose 5%. 1000 milliLiter(s) (50 mL/Hr) IV Continuous <Continuous>  dextrose 50% Injectable 25 Gram(s) IV Push once  dextrose 50% Injectable 12.5 Gram(s) IV Push once  dextrose 50% Injectable 25 Gram(s) IV Push once  diphenhydrAMINE Injectable 50 milliGRAM(s) IV Push once  folic acid 1 milliGRAM(s) Oral daily  glucagon  Injectable 1 milliGRAM(s) IntraMuscular once  glycopyrrolate 1 milliGRAM(s) Oral two times a day  insulin glargine Injectable (LANTUS) 10 Unit(s) SubCutaneous at bedtime  insulin lispro (ADMELOG) corrective regimen sliding scale   SubCutaneous every 6 hours  levETIRAcetam  Solution 1500 milliGRAM(s) Oral two times a day  metoprolol tartrate 25 milliGRAM(s) Enteral Tube two times a day  multivitamin/minerals/iron Oral Solution (CENTRUM) 15 milliLiter(s) Enteral Tube daily  pantoprazole   Suspension 40 milliGRAM(s) Oral before breakfast  polyethylene glycol 3350 17 Gram(s) Oral daily  scopolamine 1 mG/72 Hr(s) Patch 1 Patch Transdermal every 72 hours  senna 2 Tablet(s) Oral at bedtime  valproic  acid Syrup 1000 milliGRAM(s) Oral <User Schedule>  valproic  acid Syrup 750 milliGRAM(s) Oral <User Schedule>  vitamin A &amp; D Ointment 1 Application(s) Topical two times a day    MEDICATIONS  (PRN):  acetaminophen     Tablet .. 650 milliGRAM(s) Oral every 6 hours PRN Temp greater or equal to 38C (100.4F), Mild Pain (1 - 3)  albuterol/ipratropium for Nebulization 3 milliLiter(s) Nebulizer every 6 hours PRN Shortness of Breath and/or Wheezing  aluminum hydroxide/magnesium hydroxide/simethicone Suspension 30 milliLiter(s) Oral every 4 hours PRN Dyspepsia  dextrose Oral Gel 15 Gram(s) Oral once PRN Blood Glucose LESS THAN 70 milliGRAM(s)/deciliter  melatonin 3 milliGRAM(s) Oral at bedtime PRN Insomnia  nitroglycerin     SubLingual 0.4 milliGRAM(s) SubLingual every 5 minutes PRN Chest Pain      Diet, Pureed:   Consistent Carbohydrate Evening Snack  Low Sodium  Lacto-Ovo Veg (Accepts Milk Prod., Eggs)  Tube Feeding Modality: Gastrostomy  Glucerna 1.2 Pedrito  Total Volume for 24 Hours (mL): 720  Intermittent  Until Goal Tube Feed Rate (mL per Hour): 60  Tube Feeding Hours ON: 12  Tube Feeding OFF (Hours): 12  Tube Feed Start Time: 19:00  Liquid Protein Supplement     Qty per Day:  1  Supplement Feeding Modality:  Oral  Glucerna Shake Cans or Servings Per Day:  1       Frequency:  Two Times a day (03-01-23 @ 12:14) [Active]          Vital Signs Last 24 Hrs  T(C): 37.1 (06 Mar 2023 04:47), Max: 37.1 (05 Mar 2023 11:43)  T(F): 98.7 (06 Mar 2023 04:47), Max: 98.7 (05 Mar 2023 11:43)  HR: 80 (06 Mar 2023 06:18) (65 - 87)  BP: 118/71 (06 Mar 2023 04:47) (115/62 - 132/77)  BP(mean): 95 (05 Mar 2023 16:46) (95 - 95)  RR: 19 (06 Mar 2023 04:47) (17 - 19)  SpO2: 98% (06 Mar 2023 09:13) (95% - 100%)    Parameters below as of 06 Mar 2023 04:47  Patient On (Oxygen Delivery Method): tracheostomy collar          03-05-23 @ 07:01  -  03-06-23 @ 07:00  --------------------------------------------------------  IN: 0 mL / OUT: 201 mL / NET: -201 mL              LABS:                        11.2   11.20 )-----------( 191      ( 06 Mar 2023 09:18 )             34.6                     WBC:  WBC Count: 11.20 K/uL (03-06 @ 09:18)  WBC Count: 10.36 K/uL (03-04 @ 09:30)      MICROBIOLOGY:  RECENT CULTURES:                  Sodium:  Sodium, Serum: 144 mmol/L (03-04 @ 09:30)      0.74 mg/dL 03-04 @ 09:30      Hemoglobin:  Hemoglobin: 11.2 g/dL (03-06 @ 09:18)  Hemoglobin: 11.6 g/dL (03-04 @ 09:30)      Platelets: Platelet Count - Automated: 191 K/uL (03-06 @ 09:18)  Platelet Count - Automated: 200 K/uL (03-04 @ 09:30)              RADIOLOGY & ADDITIONAL STUDIES:      MICROBIOLOGY:  RECENT CULTURES:

## 2023-03-06 NOTE — PROGRESS NOTE ADULT - ASSESSMENT
73 yo male w/ PMH of COPD, CAD sp PCI w/ stent, CABG 2014, HF w/ PeF, 2nd degree heart block, sp PPM, HTN, DM, CKD Stage 3, CVA- lacunar infarcts admitted to ICU originally for cardiac arrest. ACLS for PEA arrest and ROSC returned after 5 minutes. Cardiac arrest possibly secondary to severe hypoglycemia from eating less and not tracking blood sugar carefully. Hospital course complicated by 1) prolonged hypoxemic respiratory failure. Difficult extubation requiring s/p trach and peg 2) left upper extremity/left subclavian DVT and placed on Eliquis 3) multiple infections (Enterococcal faecalis cellulitis, infected left hand hematoma, tracheobronchitis secondary to citrobacter 4) tonic clonic seizure - on Keppra / Depakote - seen by NEUROLOGY 5) fever of 102 on 1/19.     # Acute/Chronic Respiratory failure w/ Hypoxia and Hypercapnia/ COPD, now trach-vent dependent.  - S/p intubation; failed extubation requiring s/p trach and peg   - PMV ([place] Valve) placed on 02/15/2023.  - tolerating Trach collar, vent discontinued; continue weaning as per Pulmonary   - cont w/ Symbicort, Duonebs prn  - Surgery on board, s/p Trach change to #4 fenestrated tracheostomy placed 1/30. Replaced on 02/14/2023.  - Had VFSS/MBS eval on 02/16/2023: Recommended Puree with thin liquids, c/w diet with tube feed for supplementation   - Frequent suction  - Capping trial per pulmonary  - Pulmonary following, kani recs    # Myoclonic Seizure   - EEG: Myoclonic seizure  - Neurology on board   - cont w/ Keppra and Depakote.  - Myoclonic episode to LLE and LUE noted by his son.  - Mg 2.3 and K 4.1.  - Depakote level 55 (normal ); Keppra level pending.  - D/w Dr Loo (02/18/2023): recommended increase only the evening dose of Depakote from 750 mg to 1000 mg. and continue 750 mg.    # S/p Cardiac Arrest, acute metabolic encephalopathy due to severe hypoglycemia.  - s/p ACLS w/ ROSC after 5 min   - 2nd degree heart block, s/p PPM in place.  - ECHO Takotsubo cardiomyopathy. EF 50-55%, LVH, mild pHTN    - s/p ICU course; Hemodynamically stable now  - cont w/ Metoprolol, ASA and atorvastatin.    # Multiple infectious Disease   - tracheo-bronchitis secondary to citrobacter?? - resolved.  - enterococcal faecalis cellulitis - resolved   - infected left hand hematoma - s/p bedside debridement 12/13 w/ hand surgery   - monitor off abx              # Occlusive Left subclavian thrombosis   - LUE venous duplex : occlusive thrombus in the left mid subclavian vein with partial slow flow detected in the lateral subclavian vein.  - LUE arterial Duplex neg   - cont w/ Eliquis     # DM2   - A1c 8.8%  - cont w/ Lantus   - cont w/ FS monitoring w/ insulin s/s coverage     # HTN, Cad s/p PCI w/ stent/ CABG and HPL.  - cont with ASA, Metoprolol and Atorvastatin     # Anemia of Chronic Disease  - h/h stable, will cont  to monitor   - Hb 10.9, stable.    # Stage 3-4 sacral decubiti wound  - s/p debridement 12/19  -cont w/ wound care as recommended     # Dysphagia/ Moderate Protein Calorie Malnutrition  - Peg tube in place   - continue with tube feeding at goal as tolerated.     # Constipation  - cont w/ senna, Miralax prn     DVT prophylaxis: Eliquis.    Disposition: Patient has no insurance and Family in process of getting guardianship, court date 3/12.

## 2023-03-06 NOTE — PROGRESS NOTE ADULT - SUBJECTIVE AND OBJECTIVE BOX
Patient: BRANDON CAMARILLO [id number] 72y Male                            Hospitalist Attending Note    Son at bedside.    No complaints.     ____________________PHYSICAL EXAM:  GENERAL:  NAD, alert  HEENT: NCAT.  Trach in place.   CARDIOVASCULAR:  S1, S2  LUNGS: CTAB  ABDOMEN:  soft, (-) tenderness, (-) distension, (+) bowel sounds, (-) guarding, (-) rebound (-) rigidity  EXTREMITIES:  no cyanosis / clubbing / edema.   NEURO: strength symmetric.   ____________________    VITALS:  Vital Signs Last 24 Hrs  T(C): 36.4 (06 Mar 2023 16:22), Max: 37.3 (06 Mar 2023 11:07)  T(F): 97.5 (06 Mar 2023 16:22), Max: 99.2 (06 Mar 2023 11:07)  HR: 82 (06 Mar 2023 16:22) (65 - 82)  BP: 124/74 (06 Mar 2023 16:22) (118/71 - 138/71)  BP(mean): --  RR: 18 (06 Mar 2023 11:07) (18 - 19)  SpO2: 100% (06 Mar 2023 16:22) (95% - 100%)    Parameters below as of 06 Mar 2023 04:47  Patient On (Oxygen Delivery Method): tracheostomy collar     Daily     Daily   CAPILLARY BLOOD GLUCOSE      POCT Blood Glucose.: 172 mg/dL (06 Mar 2023 16:37)  POCT Blood Glucose.: 148 mg/dL (06 Mar 2023 11:17)  POCT Blood Glucose.: 161 mg/dL (06 Mar 2023 07:42)  POCT Blood Glucose.: 138 mg/dL (06 Mar 2023 05:48)  POCT Blood Glucose.: 140 mg/dL (06 Mar 2023 00:00)  POCT Blood Glucose.: 216 mg/dL (05 Mar 2023 21:11)    I&O's Summary    05 Mar 2023 07:01  -  06 Mar 2023 07:00  --------------------------------------------------------  IN: 0 mL / OUT: 201 mL / NET: -201 mL        LABS:                        11.2   11.20 )-----------( 191      ( 06 Mar 2023 09:18 )             34.6     03-06    141  |  104  |  21  ----------------------------<  173<H>  3.8   |  34<H>  |  0.76    Ca    8.8      06 Mar 2023 09:18                    MEDICATIONS:  acetaminophen     Tablet .. 650 milliGRAM(s) Oral every 6 hours PRN  albuterol/ipratropium for Nebulization 3 milliLiter(s) Nebulizer every 6 hours PRN  aluminum hydroxide/magnesium hydroxide/simethicone Suspension 30 milliLiter(s) Oral every 4 hours PRN  apixaban 5 milliGRAM(s) Oral every 12 hours  aspirin  chewable 81 milliGRAM(s) Oral daily  atorvastatin 20 milliGRAM(s) Oral at bedtime  bacitracin   Ointment 1 Application(s) Topical two times a day  budesonide 160 MICROgram(s)/formoterol 4.5 MICROgram(s) Inhaler 2 Puff(s) Inhalation two times a day  chlorhexidine 2% Cloths 1 Application(s) Topical <User Schedule>  cyanocobalamin 1000 MICROGram(s) Oral daily  dextrose 5%. 1000 milliLiter(s) IV Continuous <Continuous>  dextrose 5%. 1000 milliLiter(s) IV Continuous <Continuous>  dextrose 50% Injectable 25 Gram(s) IV Push once  dextrose 50% Injectable 12.5 Gram(s) IV Push once  dextrose 50% Injectable 25 Gram(s) IV Push once  dextrose Oral Gel 15 Gram(s) Oral once PRN  diphenhydrAMINE Injectable 50 milliGRAM(s) IV Push once  folic acid 1 milliGRAM(s) Oral daily  glucagon  Injectable 1 milliGRAM(s) IntraMuscular once  glycopyrrolate 1 milliGRAM(s) Oral two times a day  insulin glargine Injectable (LANTUS) 10 Unit(s) SubCutaneous at bedtime  insulin lispro (ADMELOG) corrective regimen sliding scale   SubCutaneous every 6 hours  levETIRAcetam  Solution 1500 milliGRAM(s) Oral two times a day  melatonin 3 milliGRAM(s) Oral at bedtime PRN  metoprolol tartrate 25 milliGRAM(s) Enteral Tube two times a day  multivitamin/minerals/iron Oral Solution (CENTRUM) 15 milliLiter(s) Enteral Tube daily  nitroglycerin     SubLingual 0.4 milliGRAM(s) SubLingual every 5 minutes PRN  pantoprazole   Suspension 40 milliGRAM(s) Oral before breakfast  polyethylene glycol 3350 17 Gram(s) Oral daily  scopolamine 1 mG/72 Hr(s) Patch 1 Patch Transdermal every 72 hours  senna 2 Tablet(s) Oral at bedtime  valproic  acid Syrup 1000 milliGRAM(s) Oral <User Schedule>  valproic  acid Syrup 750 milliGRAM(s) Oral <User Schedule>  vitamin A &amp; D Ointment 1 Application(s) Topical two times a day

## 2023-03-07 LAB
BASE EXCESS BLDA CALC-SCNC: 9.6 MMOL/L — HIGH (ref -2–3)
BLOOD GAS COMMENTS ARTERIAL: SIGNIFICANT CHANGE UP
CO2 BLDA-SCNC: 38 MMOL/L — HIGH (ref 19–24)
GAS PNL BLDA: SIGNIFICANT CHANGE UP
GLUCOSE BLDC GLUCOMTR-MCNC: 117 MG/DL — HIGH (ref 70–99)
GLUCOSE BLDC GLUCOMTR-MCNC: 122 MG/DL — HIGH (ref 70–99)
GLUCOSE BLDC GLUCOMTR-MCNC: 139 MG/DL — HIGH (ref 70–99)
GLUCOSE BLDC GLUCOMTR-MCNC: 206 MG/DL — HIGH (ref 70–99)
HCO3 BLDA-SCNC: 36 MMOL/L — HIGH (ref 21–28)
HOROWITZ INDEX BLDA+IHG-RTO: 28 — SIGNIFICANT CHANGE UP
PCO2 BLDA: 54 MMHG — HIGH (ref 32–46)
PH BLDA: 7.43 — SIGNIFICANT CHANGE UP (ref 7.35–7.45)
PO2 BLDA: 110 MMHG — HIGH (ref 83–108)
SAO2 % BLDA: 98.7 % — HIGH (ref 94–98)

## 2023-03-07 PROCEDURE — 99232 SBSQ HOSP IP/OBS MODERATE 35: CPT

## 2023-03-07 RX ORDER — IPRATROPIUM/ALBUTEROL SULFATE 18-103MCG
3 AEROSOL WITH ADAPTER (GRAM) INHALATION ONCE
Refills: 0 | Status: COMPLETED | OUTPATIENT
Start: 2023-03-07 | End: 2023-03-07

## 2023-03-07 RX ADMIN — Medication 3 MILLILITER(S): at 14:16

## 2023-03-07 RX ADMIN — SCOPALAMINE 1 PATCH: 1 PATCH, EXTENDED RELEASE TRANSDERMAL at 23:09

## 2023-03-07 RX ADMIN — ROBINUL 1 MILLIGRAM(S): 0.2 INJECTION INTRAMUSCULAR; INTRAVENOUS at 05:15

## 2023-03-07 RX ADMIN — BUDESONIDE AND FORMOTEROL FUMARATE DIHYDRATE 2 PUFF(S): 160; 4.5 AEROSOL RESPIRATORY (INHALATION) at 18:46

## 2023-03-07 RX ADMIN — Medication 1 MILLIGRAM(S): at 13:01

## 2023-03-07 RX ADMIN — Medication 1 APPLICATION(S): at 05:15

## 2023-03-07 RX ADMIN — LEVETIRACETAM 1500 MILLIGRAM(S): 250 TABLET, FILM COATED ORAL at 18:40

## 2023-03-07 RX ADMIN — APIXABAN 5 MILLIGRAM(S): 2.5 TABLET, FILM COATED ORAL at 05:15

## 2023-03-07 RX ADMIN — SCOPALAMINE 1 PATCH: 1 PATCH, EXTENDED RELEASE TRANSDERMAL at 08:00

## 2023-03-07 RX ADMIN — POLYETHYLENE GLYCOL 3350 17 GRAM(S): 17 POWDER, FOR SOLUTION ORAL at 12:55

## 2023-03-07 RX ADMIN — APIXABAN 5 MILLIGRAM(S): 2.5 TABLET, FILM COATED ORAL at 18:45

## 2023-03-07 RX ADMIN — PREGABALIN 1000 MICROGRAM(S): 225 CAPSULE ORAL at 12:55

## 2023-03-07 RX ADMIN — Medication 1000 MILLIGRAM(S): at 21:51

## 2023-03-07 RX ADMIN — ROBINUL 1 MILLIGRAM(S): 0.2 INJECTION INTRAMUSCULAR; INTRAVENOUS at 18:43

## 2023-03-07 RX ADMIN — SCOPALAMINE 1 PATCH: 1 PATCH, EXTENDED RELEASE TRANSDERMAL at 19:40

## 2023-03-07 RX ADMIN — BUDESONIDE AND FORMOTEROL FUMARATE DIHYDRATE 2 PUFF(S): 160; 4.5 AEROSOL RESPIRATORY (INHALATION) at 05:16

## 2023-03-07 RX ADMIN — Medication 1 APPLICATION(S): at 18:47

## 2023-03-07 RX ADMIN — Medication 2: at 13:02

## 2023-03-07 RX ADMIN — ATORVASTATIN CALCIUM 20 MILLIGRAM(S): 80 TABLET, FILM COATED ORAL at 21:51

## 2023-03-07 RX ADMIN — Medication 81 MILLIGRAM(S): at 12:54

## 2023-03-07 RX ADMIN — INSULIN GLARGINE 10 UNIT(S): 100 INJECTION, SOLUTION SUBCUTANEOUS at 23:07

## 2023-03-07 RX ADMIN — Medication 750 MILLIGRAM(S): at 12:54

## 2023-03-07 RX ADMIN — PANTOPRAZOLE SODIUM 40 MILLIGRAM(S): 20 TABLET, DELAYED RELEASE ORAL at 05:15

## 2023-03-07 RX ADMIN — CHLORHEXIDINE GLUCONATE 1 APPLICATION(S): 213 SOLUTION TOPICAL at 05:16

## 2023-03-07 RX ADMIN — Medication 1 APPLICATION(S): at 05:17

## 2023-03-07 RX ADMIN — Medication 1 APPLICATION(S): at 18:42

## 2023-03-07 RX ADMIN — Medication 15 MILLILITER(S): at 12:55

## 2023-03-07 RX ADMIN — Medication 25 MILLIGRAM(S): at 05:15

## 2023-03-07 RX ADMIN — Medication 25 MILLIGRAM(S): at 18:41

## 2023-03-07 RX ADMIN — LEVETIRACETAM 1500 MILLIGRAM(S): 250 TABLET, FILM COATED ORAL at 05:14

## 2023-03-07 NOTE — PROGRESS NOTE ADULT - SUBJECTIVE AND OBJECTIVE BOX
Patient: BRANDON CAMARILLO [id number] 72y Male                            Hospitalist Attending Note    Son at bedside.    No complaints.     ____________________PHYSICAL EXAM:  GENERAL:  NAD, alert  HEENT: NCAT.  Trach in place.   CARDIOVASCULAR:  S1, S2  LUNGS: CTAB  ABDOMEN:  soft, (-) tenderness, (-) distension, (+) bowel sounds, (-) guarding, (-) rebound (-) rigidity  EXTREMITIES:  no cyanosis / clubbing / edema.   NEURO: strength symmetric.   ____________________    VITALS:  Vital Signs Last 24 Hrs  T(C): 36.7 (07 Mar 2023 16:06), Max: 37.5 (07 Mar 2023 11:11)  T(F): 98 (07 Mar 2023 16:06), Max: 99.5 (07 Mar 2023 11:11)  HR: 72 (07 Mar 2023 16:06) (72 - 91)  BP: 112/70 (07 Mar 2023 16:06) (112/70 - 147/85)  BP(mean): --  RR: 18 (07 Mar 2023 16:06) (18 - 19)  SpO2: 99% (07 Mar 2023 16:06) (98% - 100%)    Parameters below as of 07 Mar 2023 11:11  Patient On (Oxygen Delivery Method): nasal cannula  O2 Flow (L/min): 2   Daily     Daily   CAPILLARY BLOOD GLUCOSE      POCT Blood Glucose.: 117 mg/dL (07 Mar 2023 17:05)  POCT Blood Glucose.: 206 mg/dL (07 Mar 2023 12:51)  POCT Blood Glucose.: 139 mg/dL (07 Mar 2023 05:12)  POCT Blood Glucose.: 118 mg/dL (06 Mar 2023 23:05)    I&O's Summary    06 Mar 2023 07:01  -  07 Mar 2023 07:00  --------------------------------------------------------  IN: 0 mL / OUT: 200 mL / NET: -200 mL    07 Mar 2023 07:01  -  07 Mar 2023 17:53  --------------------------------------------------------  IN: 480 mL / OUT: 0 mL / NET: 480 mL        LABS:                        11.2   11.20 )-----------( 191      ( 06 Mar 2023 09:18 )             34.6     03-06    141  |  104  |  21  ----------------------------<  173<H>  3.8   |  34<H>  |  0.76    Ca    8.8      06 Mar 2023 09:18                    MEDICATIONS:  acetaminophen     Tablet .. 650 milliGRAM(s) Oral every 6 hours PRN  albuterol/ipratropium for Nebulization 3 milliLiter(s) Nebulizer every 6 hours PRN  aluminum hydroxide/magnesium hydroxide/simethicone Suspension 30 milliLiter(s) Oral every 4 hours PRN  apixaban 5 milliGRAM(s) Oral every 12 hours  aspirin  chewable 81 milliGRAM(s) Oral daily  atorvastatin 20 milliGRAM(s) Oral at bedtime  bacitracin   Ointment 1 Application(s) Topical two times a day  budesonide 160 MICROgram(s)/formoterol 4.5 MICROgram(s) Inhaler 2 Puff(s) Inhalation two times a day  chlorhexidine 2% Cloths 1 Application(s) Topical <User Schedule>  cyanocobalamin 1000 MICROGram(s) Oral daily  dextrose 5%. 1000 milliLiter(s) IV Continuous <Continuous>  dextrose 5%. 1000 milliLiter(s) IV Continuous <Continuous>  dextrose 50% Injectable 25 Gram(s) IV Push once  dextrose 50% Injectable 12.5 Gram(s) IV Push once  dextrose 50% Injectable 25 Gram(s) IV Push once  dextrose Oral Gel 15 Gram(s) Oral once PRN  diphenhydrAMINE Injectable 50 milliGRAM(s) IV Push once  folic acid 1 milliGRAM(s) Oral daily  glucagon  Injectable 1 milliGRAM(s) IntraMuscular once  glycopyrrolate 1 milliGRAM(s) Oral two times a day  insulin glargine Injectable (LANTUS) 10 Unit(s) SubCutaneous at bedtime  insulin lispro (ADMELOG) corrective regimen sliding scale   SubCutaneous every 6 hours  levETIRAcetam  Solution 1500 milliGRAM(s) Oral two times a day  melatonin 3 milliGRAM(s) Oral at bedtime PRN  metoprolol tartrate 25 milliGRAM(s) Enteral Tube two times a day  multivitamin/minerals/iron Oral Solution (CENTRUM) 15 milliLiter(s) Enteral Tube daily  nitroglycerin     SubLingual 0.4 milliGRAM(s) SubLingual every 5 minutes PRN  pantoprazole   Suspension 40 milliGRAM(s) Oral before breakfast  polyethylene glycol 3350 17 Gram(s) Oral daily  scopolamine 1 mG/72 Hr(s) Patch 1 Patch Transdermal every 72 hours  senna 2 Tablet(s) Oral at bedtime  valproic  acid Syrup 750 milliGRAM(s) Oral <User Schedule>  valproic  acid Syrup 1000 milliGRAM(s) Oral <User Schedule>  vitamin A &amp; D Ointment 1 Application(s) Topical two times a day

## 2023-03-07 NOTE — PROGRESS NOTE ADULT - ASSESSMENT
REVIEW OF SYMPTOMS      Able to give (reliable) ROS  NO     PHYSICAL EXAM    HEENT Unremarkable  atraumatic   RESP Fair air entry EXP prolonged    Harsh breath sound Resp distres mild   CARDIAC S1 S2 No S3     NO JVD    ABDOMEN SOFT BS PRESENT NOT DISTENDED No hepatosplenomegaly   PEDAL EDEMA present No calf tenderness  NO rash     GENERAL DATA .   GOC.  12/11/2022 full code       ALLGY.  nka                            WT. ..  12/2/2022 86  BMI. ..    12/2/2022 29                          ICU STAY.  .. 11/29-12/9  COVID.   .. 12/2/2022 scv2 (-)   .. 11/20/2022 scv2 (-)   BEST PRACTICE ISSUES.    HOB ELEVATN. Yes  DVT PPLX.    .. 1/3/2023 apixa 5.2 (vte)    (DVT 12/12 l Subclav throm)  ALEJO PPLX.   INFN PPLX.   .. 11/14 chlorhex 2%   SP SW ANTWAN.         DIET.    ..  12/15 glucerna 1.2 1440 gt   IV fl.    PROCEDURES.  .. 3/7/2023 trach decannulatd     ABGS.  3/7/2023 5:20 PM 2l 743/54/110  1/6/2023 ac 16/450/.3/5 746/49/123     VS/ PO/IO/ VENT/ DRIPS.  3/7/2023 afeb 70 110/70   3/7/2023 2l 100%     PATIENT PRESENTATION.  72 m doa 11/14/2022 cac    PMH  PMH CAD s/p PCI with stent 2015 and CABG 2014,   PMH  s/p medtronic PPM,   PMH CVA (lacunar infarct)    HOSPITAL COURSE   .. 1) PEA arrest with ROSC after approximately 5 min 11/14  Now communicative   .. 2) DVT 12/12 l Subclav thromS 12/15/2022 lvnx 80.2 1/3 changed to apixaban 5.2  .. 3) TRACH 12/5/2022 trach  .. 4) PEG12/5/2022 peg   .. 5) Cellulitis hand absc 12/13 mod enterococcus fecalis  staph epi 12/12-12/15/2022  cephalexin 12/15 vanco once  12/16-12/19 zosyn  .. 6) Vent weaning       PROBLEM ASSESSMENT RECOMMENDATIONS.  Trach 12/5   .. trach care   Trach change  ..  1/28 size 4 fenestrated cuffed trach  Trach decannultion.  .. 3/7/2023 2p Pt had tolerated cap for 48 h and had minimal secretions RBAA explained to son and pt and pt decannulated after both approved plan  3/7/2023 5:20 PM 2l 743/54/110  SLEEP APNEA.  .. 3/5/2023 cpap ordered  .. 3/6/2023 pt unable to tolerate cpap   .. 3/7/2023 cpap dced   VTE  .. On apixaba  INFECTION.  .. w 1/17-1/18-1/20-1/21-2/3-2/8-2/10-2/15-2/27/2023      w 8.3- 7.9 - 6.8 - 7.2 - 8.8  - 10- 10.8- 10.8-7.9    .. pr 1/17-1/20/2023 (-)  (-)   .. 1/16-1/20/2023 Rocephin started by hospitalist dced   CAD.  .. 11/14 asa 81   .. 12/13 metoprolol 25.2   CHF   .. Cr 2/6/2023 Cr .7   .. 11/14/2022 echo mod decr segmental lvsf apical lateral apiccal ant segment are abn takotsubo cmpthy pasp 42 .  .. Monitor for chf has chr hfref per echo   COPD   .. 2/1 duoneb p   .. 12/30 symbicort 160   .. 1/7/2023 glycopyrrolate 1.2   .. 1/9/2023 ipratropium  .. 1/15/2023 scopolamine   .. continue bd ics for copd   Anemia.  .. Hb 1/12-1/14-1/19-1/20-7/21-1/25-2/3-2/8-2/10-2/15-2/27/2023       Hb 9.8- 10 -9.1- 9.8  - 9.2 - 9.5 -10- 10 - 10.5 -10.8- 11   .. target hb 7 (+)  .. monitor   sp cac   .. good neuro recovery awake alert interactive   VTE  .. 1/3/2023 apixa 5.2 (vte)      TIME SPENT   Over 25 minutes aggregate care time spent on encounter; activities included   direct patient care, counseling and/or coordinating care reviewing notes, lab data/ imaging , discussion with multidisciplinary team/ patient  /family and explaining in detail risks, benefits, alternatives  of the recommendations     BRANDON CAMARILLO

## 2023-03-07 NOTE — PROGRESS NOTE ADULT - ASSESSMENT
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 ACLS for PEA arrest and ROSC returned after 5 minutes. Cardiac arrest possibly secondary to severe hypoglycemia from eating less and not tracking blood sugar carefully. Hospital course complicated by 1) prolonged hypoxemic respiratory failure. Difficult extubation requiring s/p trach and peg 2) left upper extremity/left subclavian DVT and placed on Eliquis 3) multiple infections (Enterococcal faecalis cellulitis, infected left hand hematoma, tracheobronchitis secondary to citrobacter 4) tonic clonic seizure - on Keppra / Depakote - seen by NEUROLOGY 5) fever of 102 on 1/19.     # Acute/Chronic Respiratory failure w/ Hypoxia and Hypercapnia/ COPD, now trach-vent dependent.  - S/p intubation; failed extubation requiring s/p trach and peg   - PMV (Passy [place] Valve) placed on 02/15/2023.  - tolerating Trach collar, vent discontinued; continue weaning as per Pulmonary   - cont w/ Symbicort, Duonebs prn  - Surgery on board, s/p Trach change to #4 fenestrated tracheostomy placed 1/30. Replaced on 02/14/2023.  - Had VFSS/MBS eval on 02/16/2023: Recommended Puree with thin liquids, c/w diet with tube feed for supplementation   - Frequent suction  - Possible decannulation of trach by pulmonary.   - Pulmonary following, kain recs    # Myoclonic Seizure   - EEG: Myoclonic seizure  - Neurology on board   - cont w/ Keppra and Depakote.  - Myoclonic episode to LLE and LUE noted by his son.  - Mg 2.3 and K 4.1.  - Depakote level 55 (normal ); Keppra level pending.  - D/w Dr Loo (02/18/2023): recommended increase only the evening dose of Depakote from 750 mg to 1000 mg. and continue 750 mg.    # S/p Cardiac Arrest, acute metabolic encephalopathy due to severe hypoglycemia.  - s/p ACLS w/ ROSC after 5 min   - 2nd degree heart block, s/p PPM in place.  - ECHO Takotsubo cardiomyopathy. EF 50-55%, LVH, mild pHTN    - s/p ICU course; Hemodynamically stable now  - cont w/ Metoprolol, ASA and atorvastatin.    # Multiple infectious Disease   - tracheo-bronchitis secondary to citrobacter?? - resolved.  - enterococcal faecalis cellulitis - resolved   - infected left hand hematoma - s/p bedside debridement 12/13 w/ hand surgery   - monitor off abx              # Occlusive Left subclavian thrombosis   - LUE venous duplex : occlusive thrombus in the left mid subclavian vein with partial slow flow detected in the lateral subclavian vein.  - LUE arterial Duplex neg   - cont w/ Eliquis     # DM2   - A1c 8.8%  - cont w/ Lantus   - cont w/ FS monitoring w/ insulin s/s coverage     # HTN, Cad s/p PCI w/ stent/ CABG and HPL.  - cont with ASA, Metoprolol and Atorvastatin     # Anemia of Chronic Disease  - h/h stable, will cont  to monitor   - Hb 10.9, stable.    # Stage 3-4 sacral decubiti wound  - s/p debridement 12/19  -cont w/ wound care as recommended     # Dysphagia/ Moderate Protein Calorie Malnutrition  - Peg tube in place   - continue with tube feeding at goal as tolerated.     # Constipation  - cont w/ senna, Miralax prn     DVT prophylaxis: Eliquis.    Disposition: Patient has no insurance and Family in process of getting guardianship, court date 3/12.

## 2023-03-07 NOTE — PROGRESS NOTE ADULT - SUBJECTIVE AND OBJECTIVE BOX
BRANDON CAMARILLO    [address] [address]    Allergies    No Known Allergies    Intolerances        PAST MEDICAL & SURGICAL HISTORY:  HTN (hypertension)      HLD (hyperlipidemia)      Diabetes mellitus      Lacunar infarction      BPH (benign prostatic hyperplasia)      CHF (congestive heart failure)      Chronic obstructive pulmonary disease, unspecified COPD type      S/P CABG (coronary artery bypass graft)  2014          FAMILY HISTORY:      Home Medications:  aspirin 81 mg oral delayed release tablet: 1 tab(s) orally once a day (12 Jun 2020 17:35)  Liquifilm Tears preserved ophthalmic solution: 1 drop(s) to each affected eye 2 times a day (12 Jun 2020 17:35)      MEDICATIONS  (STANDING):  apixaban 5 milliGRAM(s) Oral every 12 hours  aspirin  chewable 81 milliGRAM(s) Oral daily  atorvastatin 20 milliGRAM(s) Oral at bedtime  bacitracin   Ointment 1 Application(s) Topical two times a day  budesonide 160 MICROgram(s)/formoterol 4.5 MICROgram(s) Inhaler 2 Puff(s) Inhalation two times a day  chlorhexidine 2% Cloths 1 Application(s) Topical <User Schedule>  cyanocobalamin 1000 MICROGram(s) Oral daily  dextrose 5%. 1000 milliLiter(s) (50 mL/Hr) IV Continuous <Continuous>  dextrose 5%. 1000 milliLiter(s) (100 mL/Hr) IV Continuous <Continuous>  dextrose 50% Injectable 25 Gram(s) IV Push once  dextrose 50% Injectable 12.5 Gram(s) IV Push once  dextrose 50% Injectable 25 Gram(s) IV Push once  diphenhydrAMINE Injectable 50 milliGRAM(s) IV Push once  folic acid 1 milliGRAM(s) Oral daily  glucagon  Injectable 1 milliGRAM(s) IntraMuscular once  glycopyrrolate 1 milliGRAM(s) Oral two times a day  insulin glargine Injectable (LANTUS) 10 Unit(s) SubCutaneous at bedtime  insulin lispro (ADMELOG) corrective regimen sliding scale   SubCutaneous every 6 hours  levETIRAcetam  Solution 1500 milliGRAM(s) Oral two times a day  metoprolol tartrate 25 milliGRAM(s) Enteral Tube two times a day  multivitamin/minerals/iron Oral Solution (CENTRUM) 15 milliLiter(s) Enteral Tube daily  pantoprazole   Suspension 40 milliGRAM(s) Oral before breakfast  polyethylene glycol 3350 17 Gram(s) Oral daily  scopolamine 1 mG/72 Hr(s) Patch 1 Patch Transdermal every 72 hours  senna 2 Tablet(s) Oral at bedtime  valproic  acid Syrup 1000 milliGRAM(s) Oral <User Schedule>  valproic  acid Syrup 750 milliGRAM(s) Oral <User Schedule>  vitamin A &amp; D Ointment 1 Application(s) Topical two times a day    MEDICATIONS  (PRN):  acetaminophen     Tablet .. 650 milliGRAM(s) Oral every 6 hours PRN Temp greater or equal to 38C (100.4F), Mild Pain (1 - 3)  albuterol/ipratropium for Nebulization 3 milliLiter(s) Nebulizer every 6 hours PRN Shortness of Breath and/or Wheezing  aluminum hydroxide/magnesium hydroxide/simethicone Suspension 30 milliLiter(s) Oral every 4 hours PRN Dyspepsia  dextrose Oral Gel 15 Gram(s) Oral once PRN Blood Glucose LESS THAN 70 milliGRAM(s)/deciliter  melatonin 3 milliGRAM(s) Oral at bedtime PRN Insomnia  nitroglycerin     SubLingual 0.4 milliGRAM(s) SubLingual every 5 minutes PRN Chest Pain      Diet, Pureed:   Consistent Carbohydrate Evening Snack  Low Sodium  Lacto-Ovo Veg (Accepts Milk Prod., Eggs)  Tube Feeding Modality: Gastrostomy  Glucerna 1.2 Pedrito  Total Volume for 24 Hours (mL): 720  Intermittent  Until Goal Tube Feed Rate (mL per Hour): 60  Tube Feeding Hours ON: 12  Tube Feeding OFF (Hours): 12  Tube Feed Start Time: 19:00  Liquid Protein Supplement     Qty per Day:  1  Supplement Feeding Modality:  Oral  Glucerna Shake Cans or Servings Per Day:  1       Frequency:  Two Times a day (03-01-23 @ 12:14) [Active]          Vital Signs Last 24 Hrs  T(C): 36.8 (07 Mar 2023 05:00), Max: 37.3 (06 Mar 2023 11:07)  T(F): 98.3 (07 Mar 2023 05:00), Max: 99.2 (06 Mar 2023 11:07)  HR: 89 (07 Mar 2023 05:34) (81 - 91)  BP: 145/84 (07 Mar 2023 05:00) (124/74 - 147/85)  BP(mean): --  RR: 19 (07 Mar 2023 05:00) (18 - 19)  SpO2: 98% (07 Mar 2023 05:34) (98% - 100%)          03-06-23 @ 07:01  -  03-07-23 @ 07:00  --------------------------------------------------------  IN: 0 mL / OUT: 200 mL / NET: -200 mL              LABS:                        11.2   11.20 )-----------( 191      ( 06 Mar 2023 09:18 )             34.6     03-06    141  |  104  |  21  ----------------------------<  173<H>  3.8   |  34<H>  |  0.76    Ca    8.8      06 Mar 2023 09:18                WBC:  WBC Count: 11.20 K/uL (03-06 @ 09:18)  WBC Count: 10.36 K/uL (03-04 @ 09:30)      MICROBIOLOGY:  RECENT CULTURES:                  Sodium:  Sodium, Serum: 141 mmol/L (03-06 @ 09:18)  Sodium, Serum: 144 mmol/L (03-04 @ 09:30)      0.76 mg/dL 03-06 @ 09:18  0.74 mg/dL 03-04 @ 09:30      Hemoglobin:  Hemoglobin: 11.2 g/dL (03-06 @ 09:18)  Hemoglobin: 11.6 g/dL (03-04 @ 09:30)      Platelets: Platelet Count - Automated: 191 K/uL (03-06 @ 09:18)  Platelet Count - Automated: 200 K/uL (03-04 @ 09:30)              RADIOLOGY & ADDITIONAL STUDIES:      MICROBIOLOGY:  RECENT CULTURES:

## 2023-03-08 LAB
ANION GAP SERPL CALC-SCNC: 1 MMOL/L — LOW (ref 5–17)
BUN SERPL-MCNC: 26 MG/DL — HIGH (ref 7–23)
CALCIUM SERPL-MCNC: 9 MG/DL — SIGNIFICANT CHANGE UP (ref 8.5–10.1)
CHLORIDE SERPL-SCNC: 106 MMOL/L — SIGNIFICANT CHANGE UP (ref 96–108)
CO2 SERPL-SCNC: 34 MMOL/L — HIGH (ref 22–31)
CREAT SERPL-MCNC: 0.84 MG/DL — SIGNIFICANT CHANGE UP (ref 0.5–1.3)
EGFR: 92 ML/MIN/1.73M2 — SIGNIFICANT CHANGE UP
FLUAV AG NPH QL: SIGNIFICANT CHANGE UP
FLUBV AG NPH QL: SIGNIFICANT CHANGE UP
GLUCOSE BLDC GLUCOMTR-MCNC: 137 MG/DL — HIGH (ref 70–99)
GLUCOSE BLDC GLUCOMTR-MCNC: 155 MG/DL — HIGH (ref 70–99)
GLUCOSE BLDC GLUCOMTR-MCNC: 179 MG/DL — HIGH (ref 70–99)
GLUCOSE BLDC GLUCOMTR-MCNC: 93 MG/DL — SIGNIFICANT CHANGE UP (ref 70–99)
GLUCOSE SERPL-MCNC: 156 MG/DL — HIGH (ref 70–99)
HCT VFR BLD CALC: 37.3 % — LOW (ref 39–50)
HGB BLD-MCNC: 11.7 G/DL — LOW (ref 13–17)
MCHC RBC-ENTMCNC: 29.8 PG — SIGNIFICANT CHANGE UP (ref 27–34)
MCHC RBC-ENTMCNC: 31.4 G/DL — LOW (ref 32–36)
MCV RBC AUTO: 95.2 FL — SIGNIFICANT CHANGE UP (ref 80–100)
NRBC # BLD: 0 /100 WBCS — SIGNIFICANT CHANGE UP (ref 0–0)
PLATELET # BLD AUTO: 212 K/UL — SIGNIFICANT CHANGE UP (ref 150–400)
POTASSIUM SERPL-MCNC: 3.9 MMOL/L — SIGNIFICANT CHANGE UP (ref 3.5–5.3)
POTASSIUM SERPL-SCNC: 3.9 MMOL/L — SIGNIFICANT CHANGE UP (ref 3.5–5.3)
RBC # BLD: 3.92 M/UL — LOW (ref 4.2–5.8)
RBC # FLD: 14.6 % — HIGH (ref 10.3–14.5)
SARS-COV-2 RNA SPEC QL NAA+PROBE: SIGNIFICANT CHANGE UP
SODIUM SERPL-SCNC: 141 MMOL/L — SIGNIFICANT CHANGE UP (ref 135–145)
WBC # BLD: 10.54 K/UL — HIGH (ref 3.8–10.5)
WBC # FLD AUTO: 10.54 K/UL — HIGH (ref 3.8–10.5)

## 2023-03-08 PROCEDURE — 99232 SBSQ HOSP IP/OBS MODERATE 35: CPT

## 2023-03-08 RX ADMIN — Medication 1 APPLICATION(S): at 06:57

## 2023-03-08 RX ADMIN — Medication 1000 MILLIGRAM(S): at 21:30

## 2023-03-08 RX ADMIN — SCOPALAMINE 1 PATCH: 1 PATCH, EXTENDED RELEASE TRANSDERMAL at 19:58

## 2023-03-08 RX ADMIN — Medication 25 MILLIGRAM(S): at 06:01

## 2023-03-08 RX ADMIN — APIXABAN 5 MILLIGRAM(S): 2.5 TABLET, FILM COATED ORAL at 06:01

## 2023-03-08 RX ADMIN — Medication 750 MILLIGRAM(S): at 08:44

## 2023-03-08 RX ADMIN — PREGABALIN 1000 MICROGRAM(S): 225 CAPSULE ORAL at 11:30

## 2023-03-08 RX ADMIN — Medication 1: at 13:05

## 2023-03-08 RX ADMIN — Medication 1 APPLICATION(S): at 17:33

## 2023-03-08 RX ADMIN — ATORVASTATIN CALCIUM 20 MILLIGRAM(S): 80 TABLET, FILM COATED ORAL at 21:30

## 2023-03-08 RX ADMIN — LEVETIRACETAM 1500 MILLIGRAM(S): 250 TABLET, FILM COATED ORAL at 17:35

## 2023-03-08 RX ADMIN — LEVETIRACETAM 1500 MILLIGRAM(S): 250 TABLET, FILM COATED ORAL at 06:01

## 2023-03-08 RX ADMIN — SCOPALAMINE 1 PATCH: 1 PATCH, EXTENDED RELEASE TRANSDERMAL at 07:55

## 2023-03-08 RX ADMIN — INSULIN GLARGINE 10 UNIT(S): 100 INJECTION, SOLUTION SUBCUTANEOUS at 23:12

## 2023-03-08 RX ADMIN — Medication 1: at 23:11

## 2023-03-08 RX ADMIN — PANTOPRAZOLE SODIUM 40 MILLIGRAM(S): 20 TABLET, DELAYED RELEASE ORAL at 06:01

## 2023-03-08 RX ADMIN — Medication 25 MILLIGRAM(S): at 17:35

## 2023-03-08 RX ADMIN — ROBINUL 1 MILLIGRAM(S): 0.2 INJECTION INTRAMUSCULAR; INTRAVENOUS at 17:35

## 2023-03-08 RX ADMIN — CHLORHEXIDINE GLUCONATE 1 APPLICATION(S): 213 SOLUTION TOPICAL at 06:02

## 2023-03-08 RX ADMIN — BUDESONIDE AND FORMOTEROL FUMARATE DIHYDRATE 2 PUFF(S): 160; 4.5 AEROSOL RESPIRATORY (INHALATION) at 06:02

## 2023-03-08 RX ADMIN — Medication 15 MILLILITER(S): at 13:06

## 2023-03-08 RX ADMIN — Medication 1 MILLIGRAM(S): at 11:30

## 2023-03-08 RX ADMIN — Medication 81 MILLIGRAM(S): at 11:30

## 2023-03-08 RX ADMIN — APIXABAN 5 MILLIGRAM(S): 2.5 TABLET, FILM COATED ORAL at 17:35

## 2023-03-08 RX ADMIN — ROBINUL 1 MILLIGRAM(S): 0.2 INJECTION INTRAMUSCULAR; INTRAVENOUS at 06:01

## 2023-03-08 NOTE — PROGRESS NOTE ADULT - SUBJECTIVE AND OBJECTIVE BOX
INTERVAL HPI/OVERNIGHT EVENTS:  Pt seen and examined at bedside.     Allergies/Intolerance: No Known Allergies      MEDICATIONS  (STANDING):  apixaban 5 milliGRAM(s) Oral every 12 hours  aspirin  chewable 81 milliGRAM(s) Oral daily  atorvastatin 20 milliGRAM(s) Oral at bedtime  bacitracin   Ointment 1 Application(s) Topical two times a day  budesonide 160 MICROgram(s)/formoterol 4.5 MICROgram(s) Inhaler 2 Puff(s) Inhalation two times a day  chlorhexidine 2% Cloths 1 Application(s) Topical <User Schedule>  cyanocobalamin 1000 MICROGram(s) Oral daily  dextrose 5%. 1000 milliLiter(s) (50 mL/Hr) IV Continuous <Continuous>  dextrose 5%. 1000 milliLiter(s) (100 mL/Hr) IV Continuous <Continuous>  dextrose 50% Injectable 25 Gram(s) IV Push once  dextrose 50% Injectable 12.5 Gram(s) IV Push once  dextrose 50% Injectable 25 Gram(s) IV Push once  diphenhydrAMINE Injectable 50 milliGRAM(s) IV Push once  folic acid 1 milliGRAM(s) Oral daily  glucagon  Injectable 1 milliGRAM(s) IntraMuscular once  glycopyrrolate 1 milliGRAM(s) Oral two times a day  insulin glargine Injectable (LANTUS) 10 Unit(s) SubCutaneous at bedtime  insulin lispro (ADMELOG) corrective regimen sliding scale   SubCutaneous every 6 hours  levETIRAcetam  Solution 1500 milliGRAM(s) Oral two times a day  metoprolol tartrate 25 milliGRAM(s) Enteral Tube two times a day  multivitamin/minerals/iron Oral Solution (CENTRUM) 15 milliLiter(s) Enteral Tube daily  pantoprazole   Suspension 40 milliGRAM(s) Oral before breakfast  polyethylene glycol 3350 17 Gram(s) Oral daily  scopolamine 1 mG/72 Hr(s) Patch 1 Patch Transdermal every 72 hours  senna 2 Tablet(s) Oral at bedtime  valproic  acid Syrup 1000 milliGRAM(s) Oral <User Schedule>  valproic  acid Syrup 750 milliGRAM(s) Oral <User Schedule>  vitamin A &amp; D Ointment 1 Application(s) Topical two times a day    MEDICATIONS  (PRN):  acetaminophen     Tablet .. 650 milliGRAM(s) Oral every 6 hours PRN Temp greater or equal to 38C (100.4F), Mild Pain (1 - 3)  albuterol/ipratropium for Nebulization 3 milliLiter(s) Nebulizer every 6 hours PRN Shortness of Breath and/or Wheezing  aluminum hydroxide/magnesium hydroxide/simethicone Suspension 30 milliLiter(s) Oral every 4 hours PRN Dyspepsia  dextrose Oral Gel 15 Gram(s) Oral once PRN Blood Glucose LESS THAN 70 milliGRAM(s)/deciliter  melatonin 3 milliGRAM(s) Oral at bedtime PRN Insomnia  nitroglycerin     SubLingual 0.4 milliGRAM(s) SubLingual every 5 minutes PRN Chest Pain        ROS: all systems reviewed and wnl      PHYSICAL EXAMINATION:  Vital Signs Last 24 Hrs  T(C): 36.4 (08 Mar 2023 10:39), Max: 36.7 (07 Mar 2023 16:06)  T(F): 97.6 (08 Mar 2023 10:39), Max: 98 (07 Mar 2023 16:06)  HR: 68 (08 Mar 2023 10:39) (66 - 92)  BP: 131/72 (08 Mar 2023 10:39) (112/70 - 138/77)  BP(mean): --  RR: 18 (08 Mar 2023 10:39) (18 - 18)  SpO2: 97% (08 Mar 2023 10:39) (96% - 100%)    Parameters below as of 08 Mar 2023 10:39  Patient On (Oxygen Delivery Method): nasal cannula  O2 Flow (L/min): 2    CAPILLARY BLOOD GLUCOSE      POCT Blood Glucose.: 179 mg/dL (08 Mar 2023 12:27)  POCT Blood Glucose.: 137 mg/dL (08 Mar 2023 06:00)  POCT Blood Glucose.: 122 mg/dL (07 Mar 2023 23:06)  POCT Blood Glucose.: 117 mg/dL (07 Mar 2023 17:05)      03-07 @ 07:01  -  03-08 @ 07:00  --------------------------------------------------------  IN: 480 mL / OUT: 200 mL / NET: 280 mL        GENERAL:   NECK: supple, No JVD  CHEST/LUNG: clear to auscultation bilaterally; no rales, rhonchi, or wheezing b/l  HEART: normal S1, S2  ABDOMEN: BS+, soft, ND, NT   EXTREMITIES:  pulses palpable; no clubbing, cyanosis, or edema b/l LEs  SKIN: no rashes or lesions      LABS:                        11.7   10.54 )-----------( 212      ( 08 Mar 2023 08:36 )             37.3     03-08    141  |  106  |  26<H>  ----------------------------<  156<H>  3.9   |  34<H>  |  0.84    Ca    9.0      08 Mar 2023 08:36

## 2023-03-08 NOTE — PROGRESS NOTE ADULT - ASSESSMENT
73 yo male w/ PMH of COPD, CAD sp PCI w/ stent, CABG 2014, HF w/ PeF, 2nd degree heart block, sp PPM, HTN, DM, CKD Stage 3, CVA- lacunar infarcts admitted to ICU originally for cardiac arrest. ACLS for PEA arrest and ROSC returned after 5 minutes. Cardiac arrest possibly secondary to severe hypoglycemia from eating less and not tracking blood sugar carefully. Hospital course complicated by 1) prolonged hypoxemic respiratory failure. Difficult extubation requiring s/p trach and peg 2) left upper extremity/left subclavian DVT and placed on Eliquis 3) multiple infections (Enterococcal faecalis cellulitis, infected left hand hematoma, tracheobronchitis secondary to citrobacter 4) tonic clonic seizure - on Keppra / Depakote - seen by NEUROLOGY 5) fever of 102 on 1/19.     # Acute/Chronic Respiratory failure w/ Hypoxia and Hypercapnia/ COPD, now trach-vent dependent.  - S/p intubation; failed extubation requiring s/p trach and peg   - PMV (Passy [place] Valve) placed on 02/15/2023.  - tolerating Trach collar, vent discontinued; continue weaning as per Pulmonary   - cont w/ Symbicort, Duonebs prn  - Surgery on board, s/p Trach change to #4 fenestrated tracheostomy placed 1/30. Replaced on 02/14/2023.  - Had VFSS/MBS eval on 02/16/2023: Recommended Puree with thin liquids, c/w diet with tube feed for supplementation   - Frequent suction  - Possible decannulation of trach by pulmonary.   - Pulmonary following, kain recs    # Myoclonic Seizure   - EEG: Myoclonic seizure  - Neurology on board   - cont w/ Keppra and Depakote.  - Myoclonic episode to LLE and LUE noted by his son.  - Mg 2.3 and K 4.1.  - Depakote level 55 (normal ); Keppra level pending.  - D/w Dr Loo (02/18/2023): recommended increase only the evening dose of Depakote from 750 mg to 1000 mg. and continue 750 mg.    # S/p Cardiac Arrest, acute metabolic encephalopathy due to severe hypoglycemia.  - s/p ACLS w/ ROSC after 5 min   - 2nd degree heart block, s/p PPM in place.  - ECHO Takotsubo cardiomyopathy. EF 50-55%, LVH, mild pHTN    - s/p ICU course; Hemodynamically stable now  - cont w/ Metoprolol, ASA and atorvastatin.    # Multiple infectious Disease   - tracheo-bronchitis secondary to citrobacter?? - resolved.  - enterococcal faecalis cellulitis - resolved   - infected left hand hematoma - s/p bedside debridement 12/13 w/ hand surgery   - monitor off abx              # Occlusive Left subclavian thrombosis   - LUE venous duplex : occlusive thrombus in the left mid subclavian vein with partial slow flow detected in the lateral subclavian vein.  - LUE arterial Duplex neg   - cont w/ Eliquis     # DM2   - A1c 8.8%  - cont w/ Lantus   - cont w/ FS monitoring w/ insulin s/s coverage     # HTN, Cad s/p PCI w/ stent/ CABG and HPL.  - cont with ASA, Metoprolol and Atorvastatin     # Anemia of Chronic Disease  - h/h stable, will cont  to monitor   - Hb 10.9, stable.    # Stage 3-4 sacral decubiti wound  - s/p debridement 12/19  -cont w/ wound care as recommended     # Dysphagia/ Moderate Protein Calorie Malnutrition  - Peg tube in place   - continue with tube feeding at goal as tolerated.     # Constipation  - cont w/ senna, Miralax prn     DVT prophylaxis: Eliquis.    Disposition: Patient has no insurance and Family in process of getting guardianship, court date 3/12.      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 ACLS for PEA arrest and ROSC returned after 5 minutes. Cardiac arrest possibly secondary to severe hypoglycemia from eating less and not tracking blood sugar carefully. Hospital course complicated by 1) prolonged hypoxemic respiratory failure. Difficult extubation requiring s/p trach and peg 2) left upper extremity/left subclavian DVT and placed on Eliquis 3) multiple infections (Enterococcal faecalis cellulitis, infected left hand hematoma, tracheobronchitis secondary to citrobacter 4) tonic clonic seizure - on Keppra / Depakote - seen by NEUROLOGY 5) fever of 102 on 1/19.     # Acute/Chronic Respiratory failure w/ Hypoxia and Hypercapnia/ COPD, now trach-vent dependent.  - S/p intubation; failed extubation requiring s/p trach and peg. Trach now removed.    - PMV (Passy [place] Valve) placed on 02/15/2023.  - cont w/ Symbicort, Duonebs prn  - Surgery on board, s/p Trach change to #4 fenestrated tracheostomy placed 1/30. Replaced on 02/14/2023.  - Frequent suction  - PEG feeds for now.    - Pulmonary following, kain recs    # Myoclonic Seizure   - EEG: Myoclonic seizure  - Neurology on board   - cont w/ Keppra and Depakote.  - Myoclonic episode to LLE and LUE noted by his son.  - Mg 2.3 and K 4.1.  - Depakote level 55 (normal ); Keppra level pending.  - D/w Dr Loo (02/18/2023): recommended increase only the evening dose of Depakote from 750 mg to 1000 mg. and continue 750 mg.    # S/p Cardiac Arrest, acute metabolic encephalopathy due to severe hypoglycemia.  - s/p ACLS w/ ROSC after 5 min   - 2nd degree heart block, s/p PPM in place.  - ECHO Takotsubo cardiomyopathy. EF 50-55%, LVH, mild pHTN    - s/p ICU course; Hemodynamically stable now  - cont w/ Metoprolol, ASA and atorvastatin.    # Multiple infectious Disease   - tracheo-bronchitis secondary to citrobacter?? - resolved.  - enterococcal faecalis cellulitis - resolved   - infected left hand hematoma - s/p bedside debridement 12/13 w/ hand surgery   - monitor off abx              # Occlusive Left subclavian thrombosis   - LUE venous duplex : occlusive thrombus in the left mid subclavian vein with partial slow flow detected in the lateral subclavian vein.  - LUE arterial Duplex neg   - cont w/ Eliquis     # DM2   - A1c 8.8%  - cont w/ Lantus   - cont w/ FS monitoring w/ insulin s/s coverage     # HTN, Cad s/p PCI w/ stent/ CABG and HPL.  - cont with ASA, Metoprolol and Atorvastatin     # Anemia of Chronic Disease  - h/h stable, will cont  to monitor   - Hb 10.9, stable.    # Stage 3-4 sacral decubiti wound  - s/p debridement 12/19  -cont w/ wound care as recommended     # Dysphagia/ Moderate Protein Calorie Malnutrition  - Peg tube in place   - continue with tube feeding at goal as tolerated.     # Constipation  - cont w/ senna, Miralax prn     DVT prophylaxis: Eliquis.    Disposition: Patient has no insurance and Family in process of getting guardianship, court date 3/12.

## 2023-03-08 NOTE — PROGRESS NOTE ADULT - SUBJECTIVE AND OBJECTIVE BOX
BRANDON CAMARILLO    LVS 2C [address]    Allergies    No Known Allergies    Intolerances        PAST MEDICAL & SURGICAL HISTORY:  HTN (hypertension)      HLD (hyperlipidemia)      Diabetes mellitus      Lacunar infarction      BPH (benign prostatic hyperplasia)      CHF (congestive heart failure)      Chronic obstructive pulmonary disease, unspecified COPD type      S/P CABG (coronary artery bypass graft)  2014          FAMILY HISTORY:      Home Medications:  aspirin 81 mg oral delayed release tablet: 1 tab(s) orally once a day (12 Jun 2020 17:35)  Liquifilm Tears preserved ophthalmic solution: 1 drop(s) to each affected eye 2 times a day (12 Jun 2020 17:35)      MEDICATIONS  (STANDING):  apixaban 5 milliGRAM(s) Oral every 12 hours  aspirin  chewable 81 milliGRAM(s) Oral daily  atorvastatin 20 milliGRAM(s) Oral at bedtime  bacitracin   Ointment 1 Application(s) Topical two times a day  budesonide 160 MICROgram(s)/formoterol 4.5 MICROgram(s) Inhaler 2 Puff(s) Inhalation two times a day  chlorhexidine 2% Cloths 1 Application(s) Topical <User Schedule>  cyanocobalamin 1000 MICROGram(s) Oral daily  dextrose 5%. 1000 milliLiter(s) (100 mL/Hr) IV Continuous <Continuous>  dextrose 5%. 1000 milliLiter(s) (50 mL/Hr) IV Continuous <Continuous>  dextrose 50% Injectable 25 Gram(s) IV Push once  dextrose 50% Injectable 12.5 Gram(s) IV Push once  dextrose 50% Injectable 25 Gram(s) IV Push once  diphenhydrAMINE Injectable 50 milliGRAM(s) IV Push once  folic acid 1 milliGRAM(s) Oral daily  glucagon  Injectable 1 milliGRAM(s) IntraMuscular once  glycopyrrolate 1 milliGRAM(s) Oral two times a day  insulin glargine Injectable (LANTUS) 10 Unit(s) SubCutaneous at bedtime  insulin lispro (ADMELOG) corrective regimen sliding scale   SubCutaneous every 6 hours  levETIRAcetam  Solution 1500 milliGRAM(s) Oral two times a day  metoprolol tartrate 25 milliGRAM(s) Enteral Tube two times a day  multivitamin/minerals/iron Oral Solution (CENTRUM) 15 milliLiter(s) Enteral Tube daily  pantoprazole   Suspension 40 milliGRAM(s) Oral before breakfast  polyethylene glycol 3350 17 Gram(s) Oral daily  scopolamine 1 mG/72 Hr(s) Patch 1 Patch Transdermal every 72 hours  senna 2 Tablet(s) Oral at bedtime  valproic  acid Syrup 1000 milliGRAM(s) Oral <User Schedule>  valproic  acid Syrup 750 milliGRAM(s) Oral <User Schedule>  vitamin A &amp; D Ointment 1 Application(s) Topical two times a day    MEDICATIONS  (PRN):  acetaminophen     Tablet .. 650 milliGRAM(s) Oral every 6 hours PRN Temp greater or equal to 38C (100.4F), Mild Pain (1 - 3)  albuterol/ipratropium for Nebulization 3 milliLiter(s) Nebulizer every 6 hours PRN Shortness of Breath and/or Wheezing  aluminum hydroxide/magnesium hydroxide/simethicone Suspension 30 milliLiter(s) Oral every 4 hours PRN Dyspepsia  dextrose Oral Gel 15 Gram(s) Oral once PRN Blood Glucose LESS THAN 70 milliGRAM(s)/deciliter  melatonin 3 milliGRAM(s) Oral at bedtime PRN Insomnia  nitroglycerin     SubLingual 0.4 milliGRAM(s) SubLingual every 5 minutes PRN Chest Pain      Diet, Pureed:   Consistent Carbohydrate Evening Snack  Low Sodium  Lacto-Ovo Veg (Accepts Milk Prod., Eggs)  Tube Feeding Modality: Gastrostomy  Glucerna 1.2 Pedrito  Total Volume for 24 Hours (mL): 720  Intermittent  Until Goal Tube Feed Rate (mL per Hour): 60  Tube Feeding Hours ON: 12  Tube Feeding OFF (Hours): 12  Tube Feed Start Time: 19:00  Liquid Protein Supplement     Qty per Day:  1  Supplement Feeding Modality:  Oral  Glucerna Shake Cans or Servings Per Day:  1       Frequency:  Two Times a day (03-01-23 @ 12:14) [Active]          Vital Signs Last 24 Hrs  T(C): 36.4 (08 Mar 2023 05:03), Max: 37.5 (07 Mar 2023 11:11)  T(F): 97.6 (08 Mar 2023 05:03), Max: 99.5 (07 Mar 2023 11:11)  HR: 92 (08 Mar 2023 05:03) (72 - 92)  BP: 131/78 (08 Mar 2023 05:03) (112/70 - 138/77)  BP(mean): --  RR: 18 (08 Mar 2023 05:03) (18 - 18)  SpO2: 96% (08 Mar 2023 05:03) (96% - 100%)    Parameters below as of 08 Mar 2023 05:03  Patient On (Oxygen Delivery Method): nasal cannula          03-07-23 @ 07:01  -  03-08-23 @ 07:00  --------------------------------------------------------  IN: 480 mL / OUT: 200 mL / NET: 280 mL              LABS:                        11.2   11.20 )-----------( 191      ( 06 Mar 2023 09:18 )             34.6     03-06    141  |  104  |  21  ----------------------------<  173<H>  3.8   |  34<H>  |  0.76    Ca    8.8      06 Mar 2023 09:18            ABG - ( 07 Mar 2023 16:18 )  pH, Arterial: 7.43  pH, Blood: x     /  pCO2: 54    /  pO2: 110   / HCO3: 36    / Base Excess: 9.6   /  SaO2: 98.7                WBC:  WBC Count: 11.20 K/uL (03-06 @ 09:18)  WBC Count: 10.36 K/uL (03-04 @ 09:30)      MICROBIOLOGY:  RECENT CULTURES:                  Sodium:  Sodium, Serum: 141 mmol/L (03-06 @ 09:18)  Sodium, Serum: 144 mmol/L (03-04 @ 09:30)      0.76 mg/dL 03-06 @ 09:18  0.74 mg/dL 03-04 @ 09:30      Hemoglobin:  Hemoglobin: 11.2 g/dL (03-06 @ 09:18)  Hemoglobin: 11.6 g/dL (03-04 @ 09:30)      Platelets: Platelet Count - Automated: 191 K/uL (03-06 @ 09:18)  Platelet Count - Automated: 200 K/uL (03-04 @ 09:30)              RADIOLOGY & ADDITIONAL STUDIES:      MICROBIOLOGY:  RECENT CULTURES:

## 2023-03-08 NOTE — PROGRESS NOTE ADULT - ASSESSMENT
GENERAL DATA .   [facility].  12/11/2022 full code       ALLGY.  nka                            WT. ..  12/2/2022 86  BMI. ..    12/2/2022 29                          ICU STAY.  .. 11/29-12/9  COVID.   .. 12/2/2022 scv2 (-)   .. 11/20/2022 scv2 (-)   BEST PRACTICE ISSUES.    HOB ELEVATN. Yes  DVT PPLX.    .. 1/3/2023 apixa 5.2 (vte)    (DVT 12/12 l Subclav throm)  ALEJO PPLX.   INFN PPLX.   .. 11/14 chlorhex 2%   SP SW ANTWAN.         DIET.    ..  12/15 glucerna 1.2 1440 gt   IV fl.    PROCEDURES.  .. 3/7/2023 trach decannulatd   .. 2/15/2023 pmvaslve placd     ABGS.  3/7/2023 5:20 PM 2l 743/54/110  1/6/2023 ac 16/450/.3/5 746/49/123     VS/ PO/IO/ VENT/ DRIPS.  3/8/2023 afeb 70 120/70     PATIENT PRESENTATION.  72 m doa 11/14/2022 cac    PMH  PMH CAD s/p PCI with stent 2015 and CABG 2014,   PMH  s/p medtronic PPM,   PMH CVA (lacunar infarct)    HOSPITAL COURSE   .. 1) PEA arrest with ROSC after approximately 5 min 11/14  Now communicative   .. 2) DVT 12/12 l Subclav thromS 12/15/2022 lvnx 80.2 1/3 changed to apixaban 5.2  .. 3) TRACH 12/5/2022 trach  .. 4) PEG12/5/2022 peg   .. 5) Cellulitis hand absc 12/13 mod enterococcus fecalis  staph epi 12/12-12/15/2022  cephalexin 12/15 vanco once  12/16-12/19 zosyn  .. 6) Vent weaning       PROBLEM ASSESSMENT RECOMMENDATIONS.  Trach 12/5   .. trach care   Trach change  ..  1/28 size 4 fenestrated cuffed trach  Trach decannultion.  .. 3/7/2023 2p Pt had tolerated cap for 48 h and had minimal secretions RBAA explained to son and pt and pt decannulated after both approved plan  3/7/2023 5:20 PM 2l 743/54/110  .. 3/8/2023 doing well post decannulatn  SLEEP APNEA.  .. 3/5/2023 cpap ordered  .. 3/6/2023 pt unable to tolerate cpap   .. 3/7/2023 cpap dced   VTE  .. On apixaba  INFECTION.  .. w 1/17-1/18-1/20-1/21-2/3-2/8-2/10-2/15-2/27/2023      w 8.3- 7.9 - 6.8 - 7.2 - 8.8  - 10- 10.8- 10.8-7.9    .. pr 1/17-1/20/2023 (-)  (-)   .. 1/16-1/20/2023 Rocephin started by hospitalist dced   CAD.  .. 11/14 asa 81   .. 12/13 metoprolol 25.2   CHF   .. Cr 2/6/2023 Cr .7   .. 11/14/2022 echo mod decr segmental lvsf apical lateral apiccal ant segment are abn takotsubo cmpthy pasp 42 .  .. Monitor for chf has chr hfref per echo   COPD   .. 2/1 duoneb p   .. 12/30 symbicort 160   .. 1/7/2023 glycopyrrolate 1.2   .. 1/9/2023 ipratropium  .. 1/15/2023 scopolamine   .. continue bd ics for copd   Anemia.  .. Hb 1/12-1/14-1/19-1/20-7/21-1/25-2/3-2/8-2/10-2/15-2/27/2023       Hb 9.8- 10 -9.1- 9.8  - 9.2 - 9.5 -10- 10 - 10.5 -10.8- 11   .. target hb 7 (+)  .. monitor   sp cac   .. good neuro recovery awake alert interactive   VTE  .. 1/3/2023 apixa 5.2 (vte)      TIME SPENT   Over 25 minutes aggregate care time spent on encounter; activities included   direct patient care, counseling and/or coordinating care reviewing notes, lab data/ imaging , discussion with multidisciplinary team/ patient  /family and explaining in detail risks, benefits, alternatives  of the recommendations     BRANDON CAMARILLO

## 2023-03-09 LAB
GLUCOSE BLDC GLUCOMTR-MCNC: 107 MG/DL — HIGH (ref 70–99)
GLUCOSE BLDC GLUCOMTR-MCNC: 133 MG/DL — HIGH (ref 70–99)
GLUCOSE BLDC GLUCOMTR-MCNC: 161 MG/DL — HIGH (ref 70–99)
GLUCOSE BLDC GLUCOMTR-MCNC: 88 MG/DL — SIGNIFICANT CHANGE UP (ref 70–99)

## 2023-03-09 PROCEDURE — 99232 SBSQ HOSP IP/OBS MODERATE 35: CPT

## 2023-03-09 RX ADMIN — CHLORHEXIDINE GLUCONATE 1 APPLICATION(S): 213 SOLUTION TOPICAL at 05:10

## 2023-03-09 RX ADMIN — BUDESONIDE AND FORMOTEROL FUMARATE DIHYDRATE 2 PUFF(S): 160; 4.5 AEROSOL RESPIRATORY (INHALATION) at 05:10

## 2023-03-09 RX ADMIN — Medication 25 MILLIGRAM(S): at 18:34

## 2023-03-09 RX ADMIN — Medication 1000 MILLIGRAM(S): at 20:10

## 2023-03-09 RX ADMIN — SCOPALAMINE 1 PATCH: 1 PATCH, EXTENDED RELEASE TRANSDERMAL at 08:24

## 2023-03-09 RX ADMIN — LEVETIRACETAM 1500 MILLIGRAM(S): 250 TABLET, FILM COATED ORAL at 05:09

## 2023-03-09 RX ADMIN — Medication 1 APPLICATION(S): at 05:18

## 2023-03-09 RX ADMIN — Medication 15 MILLILITER(S): at 12:22

## 2023-03-09 RX ADMIN — INSULIN GLARGINE 10 UNIT(S): 100 INJECTION, SOLUTION SUBCUTANEOUS at 21:39

## 2023-03-09 RX ADMIN — Medication 1 MILLIGRAM(S): at 12:22

## 2023-03-09 RX ADMIN — LEVETIRACETAM 1500 MILLIGRAM(S): 250 TABLET, FILM COATED ORAL at 18:34

## 2023-03-09 RX ADMIN — ROBINUL 1 MILLIGRAM(S): 0.2 INJECTION INTRAMUSCULAR; INTRAVENOUS at 18:34

## 2023-03-09 RX ADMIN — ATORVASTATIN CALCIUM 20 MILLIGRAM(S): 80 TABLET, FILM COATED ORAL at 21:39

## 2023-03-09 RX ADMIN — Medication 750 MILLIGRAM(S): at 07:50

## 2023-03-09 RX ADMIN — APIXABAN 5 MILLIGRAM(S): 2.5 TABLET, FILM COATED ORAL at 18:34

## 2023-03-09 RX ADMIN — ROBINUL 1 MILLIGRAM(S): 0.2 INJECTION INTRAMUSCULAR; INTRAVENOUS at 05:11

## 2023-03-09 RX ADMIN — Medication 3 MILLIGRAM(S): at 21:49

## 2023-03-09 RX ADMIN — Medication 1 APPLICATION(S): at 18:31

## 2023-03-09 RX ADMIN — Medication 25 MILLIGRAM(S): at 05:10

## 2023-03-09 RX ADMIN — Medication 1 APPLICATION(S): at 18:33

## 2023-03-09 RX ADMIN — APIXABAN 5 MILLIGRAM(S): 2.5 TABLET, FILM COATED ORAL at 05:10

## 2023-03-09 RX ADMIN — PANTOPRAZOLE SODIUM 40 MILLIGRAM(S): 20 TABLET, DELAYED RELEASE ORAL at 05:09

## 2023-03-09 RX ADMIN — Medication 81 MILLIGRAM(S): at 12:22

## 2023-03-09 RX ADMIN — SCOPALAMINE 1 PATCH: 1 PATCH, EXTENDED RELEASE TRANSDERMAL at 19:50

## 2023-03-09 RX ADMIN — PREGABALIN 1000 MICROGRAM(S): 225 CAPSULE ORAL at 12:22

## 2023-03-09 NOTE — PROGRESS NOTE ADULT - ASSESSMENT
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 ACLS for PEA arrest and ROSC returned after 5 minutes. Cardiac arrest possibly secondary to severe hypoglycemia from eating less and not tracking blood sugar carefully. Hospital course complicated by 1) prolonged hypoxemic respiratory failure. Difficult extubation requiring s/p trach and peg 2) left upper extremity/left subclavian DVT and placed on Eliquis 3) multiple infections (Enterococcal faecalis cellulitis, infected left hand hematoma, tracheobronchitis secondary to citrobacter 4) tonic clonic seizure - on Keppra / Depakote - seen by NEUROLOGY 5) fever of 102 on 1/19.     # Acute/Chronic Respiratory failure w/ Hypoxia and Hypercapnia/ COPD, now trach-vent dependent.  - S/p intubation; failed extubation requiring s/p trach and peg. Trach now removed.    - PMV ([place] Valve) placed on 02/15/2023.  - cont w/ Symbicort, Duonebs prn  - Frequent suction  - PEG feeds for now.    - Pulmonary following, kain recs    # Myoclonic Seizure   - EEG: Myoclonic seizure  - Neurology on board   - cont w/ Keppra and Depakote.  - Myoclonic episode to LLE and LUE noted by his son.  - Mg 2.3 and K 4.1.  - Depakote level 55 (normal ); Keppra level pending.  - D/w Dr Loo (02/18/2023): recommended increase only the evening dose of Depakote from 750 mg to 1000 mg. and continue 750 mg.    # S/p Cardiac Arrest, acute metabolic encephalopathy due to severe hypoglycemia.  - s/p ACLS w/ ROSC after 5 min   - 2nd degree heart block, s/p PPM in place.  - ECHO Takotsubo cardiomyopathy. EF 50-55%, LVH, mild pHTN    - s/p ICU course; Hemodynamically stable now  - cont w/ Metoprolol, ASA and atorvastatin.    # Multiple infectious Disease   - tracheo-bronchitis secondary to citrobacter?? - resolved.  - enterococcal faecalis cellulitis - resolved   - infected left hand hematoma - s/p bedside debridement 12/13 w/ hand surgery   - monitor off abx              # Occlusive Left subclavian thrombosis   - LUE venous duplex : occlusive thrombus in the left mid subclavian vein with partial slow flow detected in the lateral subclavian vein.  - LUE arterial Duplex neg   - cont w/ Eliquis     # DM2   - A1c 8.8%  - cont w/ Lantus   - cont w/ FS monitoring w/ insulin s/s coverage     # HTN, Cad s/p PCI w/ stent/ CABG and HPL.  - cont with ASA, Metoprolol and Atorvastatin     # Anemia of Chronic Disease  - h/h stable, will cont  to monitor   - Hb 10.9, stable.    # Stage 3-4 sacral decubiti wound  - s/p debridement 12/19  -cont w/ wound care as recommended     # Dysphagia/ Moderate Protein Calorie Malnutrition  - Peg tube in place   - continue with tube feeding at goal as tolerated.   - currently getting 1/2 feeds via PEG, 1/2 orally     # Constipation  - cont w/ senna, Miralax prn     DVT prophylaxis: Eliquis.    Disposition: Patient has no insurance and Family in process of getting guardianship, court date 3/12.

## 2023-03-09 NOTE — PROGRESS NOTE ADULT - ASSESSMENT
REVIEW OF SYMPTOMS      Able to give (reliable) ROS  NO     PHYSICAL EXAM    HEENT Unremarkable  atraumatic   RESP Fair air entry EXP prolonged    Harsh breath sound Resp distres mild   CARDIAC S1 S2 No S3     NO JVD    ABDOMEN SOFT BS PRESENT NOT DISTENDED No hepatosplenomegaly   PEDAL EDEMA present No calf tenderness  NO rash       GENERAL DATA .   [facility].  12/11/2022 full code       ALLGY.  nka                            WT. ..  12/2/2022 86  BMI. ..    12/2/2022 29                          ICU STAY.  .. 11/29-12/9  COVID.   .. 12/2/2022 scv2 (-)   .. 11/20/2022 scv2 (-)   BEST PRACTICE ISSUES.    HOB ELEVATN. Yes  DVT PPLX.    .. 1/3/2023 apixa 5.2 (vte)    (DVT 12/12 l Subclav throm)  ALEJO PPLX.   INFN PPLX.   .. 11/14 chlorhex 2%   SP SW ANTWAN.         DIET.    ..  12/15 glucerna 1.2 1440 gt   IV fl.    PROCEDURES.  .. 3/7/2023 trach decannulatd   .. 2/15/2023 pmvaslve placd     ABGS.  3/7/2023 5:20 PM 2l 743/54/110  1/6/2023 ac 16/450/.3/5 746/49/123     VS/ PO/IO/ VENT/ DRIPS.  3/9/2023 afeb 78 120/70   3/9/2023 ra 96%    PATIENT PRESENTATION.  72 m doa 11/14/2022 cac    PMH  PMH CAD s/p PCI with stent 2015 and CABG 2014,   PMH  s/p medtronic PPM,   PMH CVA (lacunar infarct)    HOSPITAL COURSE   .. 1) PEA arrest with ROSC after approximately 5 min 11/14  Now communicative   .. 2) DVT 12/12 l Subclav thromS 12/15/2022 lvnx 80.2 1/3 changed to apixaban 5.2  .. 3) TRACH 12/5/2022 trach  .. 4) PEG12/5/2022 peg   .. 5) Cellulitis hand absc 12/13 mod enterococcus fecalis  staph epi 12/12-12/15/2022  cephalexin 12/15 vanco once  12/16-12/19 zosyn  .. 6) Vent weaning         PROBLEM ASSESSMENT RECOMMENDATIONS.  Trach decannultion.  .. 3/7/2023 2p Pt had tolerated cap for 48 h and had minimal secretions RBAA explained to son and pt and pt decannulated after both approved plan  3/7/2023 5:20 PM 2l 743/54/110  .. 3/8/2023 doing well post decannulatn  SLEEP APNEA.  .. 3/5/2023 cpap ordered  .. 3/6/2023 pt unable to tolerate cpap   .. 3/7/2023 cpap dced   VTE  .. On apixaba  INFECTION.  .. w 1/17-1/18-1/20-1/21-2/3-2/8-2/10-2/15-2/27/2023      w 8.3- 7.9 - 6.8 - 7.2 - 8.8  - 10- 10.8- 10.8-7.9    .. pr 1/17-1/20/2023 (-)  (-)   .. 1/16-1/20/2023 Rocephin started by hospitalist dced   CAD.  .. 11/14 asa 81   .. 12/13 metoprolol 25.2   CHF   .. Cr 2/6/2023 Cr .7   .. 11/14/2022 echo mod decr segmental lvsf apical lateral apiccal ant segment are abn takotsubo cmpthy pasp 42 .  .. Monitor for chf has chr hfref per echo   COPD   .. 2/1 duoneb p   .. 12/30 symbicort 160   .. 1/7/2023 glycopyrrolate 1.2   .. 1/9/2023 ipratropium  .. 1/15/2023 scopolamine   .. continue bd ics for copd   Anemia.  .. Hb 1/12-1/14-1/19-1/20-7/21-1/25-2/3-2/8-2/10-2/15-2/27/2023       Hb 9.8- 10 -9.1- 9.8  - 9.2 - 9.5 -10- 10 - 10.5 -10.8- 11   .. target hb 7 (+)  .. monitor   sp cac   .. good neuro recovery awake alert interactive   VTE  .. 1/3/2023 apixa 5.2 (vte)      TIME SPENT   Over 25 minutes aggregate care time spent on encounter; activities included   direct patient care, counseling and/or coordinating care reviewing notes, lab data/ imaging , discussion with multidisciplinary team/ patient  /family and explaining in detail risks, benefits, alternatives  of the recommendations     BRANDON CAMARILLO

## 2023-03-09 NOTE — PROGRESS NOTE ADULT - SUBJECTIVE AND OBJECTIVE BOX
BRANDON CAMARILLO    [facility]S [address]    Allergies    No Known Allergies    Intolerances        PAST MEDICAL & SURGICAL HISTORY:  HTN (hypertension)      HLD (hyperlipidemia)      Diabetes mellitus      Lacunar infarction      BPH (benign prostatic hyperplasia)      CHF (congestive heart failure)      Chronic obstructive pulmonary disease, unspecified COPD type      S/P CABG (coronary artery bypass graft)  2014          FAMILY HISTORY:      Home Medications:  aspirin 81 mg oral delayed release tablet: 1 tab(s) orally once a day (12 Jun 2020 17:35)  Liquifilm Tears preserved ophthalmic solution: 1 drop(s) to each affected eye 2 times a day (12 Jun 2020 17:35)      MEDICATIONS  (STANDING):  apixaban 5 milliGRAM(s) Oral every 12 hours  aspirin  chewable 81 milliGRAM(s) Oral daily  atorvastatin 20 milliGRAM(s) Oral at bedtime  bacitracin   Ointment 1 Application(s) Topical two times a day  budesonide 160 MICROgram(s)/formoterol 4.5 MICROgram(s) Inhaler 2 Puff(s) Inhalation two times a day  chlorhexidine 2% Cloths 1 Application(s) Topical <User Schedule>  cyanocobalamin 1000 MICROGram(s) Oral daily  dextrose 5%. 1000 milliLiter(s) (50 mL/Hr) IV Continuous <Continuous>  dextrose 5%. 1000 milliLiter(s) (100 mL/Hr) IV Continuous <Continuous>  dextrose 50% Injectable 25 Gram(s) IV Push once  dextrose 50% Injectable 12.5 Gram(s) IV Push once  dextrose 50% Injectable 25 Gram(s) IV Push once  diphenhydrAMINE Injectable 50 milliGRAM(s) IV Push once  folic acid 1 milliGRAM(s) Oral daily  glucagon  Injectable 1 milliGRAM(s) IntraMuscular once  glycopyrrolate 1 milliGRAM(s) Oral two times a day  insulin glargine Injectable (LANTUS) 10 Unit(s) SubCutaneous at bedtime  insulin lispro (ADMELOG) corrective regimen sliding scale   SubCutaneous every 6 hours  levETIRAcetam  Solution 1500 milliGRAM(s) Oral two times a day  metoprolol tartrate 25 milliGRAM(s) Enteral Tube two times a day  multivitamin/minerals/iron Oral Solution (CENTRUM) 15 milliLiter(s) Enteral Tube daily  pantoprazole   Suspension 40 milliGRAM(s) Oral before breakfast  polyethylene glycol 3350 17 Gram(s) Oral daily  scopolamine 1 mG/72 Hr(s) Patch 1 Patch Transdermal every 72 hours  senna 2 Tablet(s) Oral at bedtime  valproic  acid Syrup 1000 milliGRAM(s) Oral <User Schedule>  valproic  acid Syrup 750 milliGRAM(s) Oral <User Schedule>  vitamin A &amp; D Ointment 1 Application(s) Topical two times a day    MEDICATIONS  (PRN):  acetaminophen     Tablet .. 650 milliGRAM(s) Oral every 6 hours PRN Temp greater or equal to 38C (100.4F), Mild Pain (1 - 3)  albuterol/ipratropium for Nebulization 3 milliLiter(s) Nebulizer every 6 hours PRN Shortness of Breath and/or Wheezing  aluminum hydroxide/magnesium hydroxide/simethicone Suspension 30 milliLiter(s) Oral every 4 hours PRN Dyspepsia  dextrose Oral Gel 15 Gram(s) Oral once PRN Blood Glucose LESS THAN 70 milliGRAM(s)/deciliter  melatonin 3 milliGRAM(s) Oral at bedtime PRN Insomnia  nitroglycerin     SubLingual 0.4 milliGRAM(s) SubLingual every 5 minutes PRN Chest Pain      Diet, Pureed:   Consistent Carbohydrate Evening Snack  Low Sodium  Lacto-Ovo Veg (Accepts Milk Prod., Eggs)  Tube Feeding Modality: Gastrostomy  Glucerna 1.2 Pedrito  Total Volume for 24 Hours (mL): 720  Intermittent  Until Goal Tube Feed Rate (mL per Hour): 60  Tube Feeding Hours ON: 12  Tube Feeding OFF (Hours): 12  Tube Feed Start Time: 19:00  Liquid Protein Supplement     Qty per Day:  1  Supplement Feeding Modality:  Oral  Glucerna Shake Cans or Servings Per Day:  1       Frequency:  Two Times a day (03-01-23 @ 12:14) [Active]          Vital Signs Last 24 Hrs  T(C): 36.3 (09 Mar 2023 04:57), Max: 36.8 (08 Mar 2023 23:54)  T(F): 97.3 (09 Mar 2023 04:57), Max: 98.3 (08 Mar 2023 23:54)  HR: 69 (09 Mar 2023 04:57) (68 - 75)  BP: 126/77 (09 Mar 2023 04:57) (123/76 - 138/82)  BP(mean): --  RR: 18 (09 Mar 2023 04:57) (18 - 20)  SpO2: 94% (09 Mar 2023 04:57) (94% - 99%)    Parameters below as of 09 Mar 2023 04:57  Patient On (Oxygen Delivery Method): nasal cannula          03-08-23 @ 07:01  -  03-09-23 @ 07:00  --------------------------------------------------------  IN: 0 mL / OUT: 201 mL / NET: -201 mL              LABS:                        11.7   10.54 )-----------( 212      ( 08 Mar 2023 08:36 )             37.3     03-08    141  |  106  |  26<H>  ----------------------------<  156<H>  3.9   |  34<H>  |  0.84    Ca    9.0      08 Mar 2023 08:36            ABG - ( 07 Mar 2023 16:18 )  pH, Arterial: 7.43  pH, Blood: x     /  pCO2: 54    /  pO2: 110   / HCO3: 36    / Base Excess: 9.6   /  SaO2: 98.7                WBC:  WBC Count: 10.54 K/uL (03-08 @ 08:36)  WBC Count: 11.20 K/uL (03-06 @ 09:18)      MICROBIOLOGY:  RECENT CULTURES:                  Sodium:  Sodium, Serum: 141 mmol/L (03-08 @ 08:36)  Sodium, Serum: 141 mmol/L (03-06 @ 09:18)      0.84 mg/dL 03-08 @ 08:36  0.76 mg/dL 03-06 @ 09:18      Hemoglobin:  Hemoglobin: 11.7 g/dL (03-08 @ 08:36)  Hemoglobin: 11.2 g/dL (03-06 @ 09:18)      Platelets: Platelet Count - Automated: 212 K/uL (03-08 @ 08:36)  Platelet Count - Automated: 191 K/uL (03-06 @ 09:18)              RADIOLOGY & ADDITIONAL STUDIES:      MICROBIOLOGY:  RECENT CULTURES:

## 2023-03-09 NOTE — CHART NOTE - NSCHARTNOTEFT_GEN_A_CORE
Assessment:  Pt seen in 2C unit, appeared restless when seen, trying to move around, sputum in mouth noted, RN made aware and pt repositioned and suctioned.  RN stated that Pulmonologist would like to hold oral feeding for few days.  Pt adm c dx of hypoglycemia, cardiac arrest, hypothermia, s/p critical care stay, hospital course complicated by prolonged hypoxemic respiratory failure, acute/chronic respiratory failure, acute metabolic encephalopathy due to severe hypoglycemia, s/p ELMER, shock liver, COPD, s/p trach,  s/p PEG, DVT, multiple infections,  myoclonic seizure, occlusive left subclavian thrombosis,  anemia of chronic disease, functional quadriplegia, sacral decubiti, DM, anemia of chronic disease, constipation.  PMH include COPD, CAD, CABG, CHF, 2nd degree heart block, PPM, HTN, DM( was on Jardiance, metformin , Victoza, Lispro, Lantus PTA), CKD, CVA, BPH.  Pt has no insurance, family in the process of getting guardianship.      Factors impacting intake: [ ] none [ ] nausea  [ ] vomiting [ ] diarrhea [ ] constipation  [ ]chewing problems [ x] swallowing issues; 02/16, VFSS/MBS swallow evaluation c recommendation for puree thin liquids  [ x] other: on nocturnal G-Tube feeding     Diet Prescription: Diet, Pureed:   Consistent Carbohydrate {Evening Snack}  Low Sodium  Lacto-Ovo Veg (Accepts Milk Prod., Eggs)  Tube Feeding Modality: Gastrostomy  Glucerna 1.2 Pedrito  Total Volume for 24 Hours (mL): 720  Intermittent  Until Goal Tube Feed Rate (mL per Hour): 60  Tube Feeding Hours ON: 12  Tube Feeding OFF (Hours): 12  Tube Feed Start Time: 19:00  Liquid Protein Supplement     Qty per Day:  1  Supplement Feeding Modality:  Oral  Glucerna Shake Cans or Servings Per Day:  1       Frequency:  Two Times a day (03-01-23 @ 12:14)    Intake: oral intake from 51-75-> % noted   + Glucerna 1.2 @ 60 ml/hr x 12 hours=720 ml, 864 calories, 43 grams protein, 580 ml free water     Current Weight: 03/07, unable to weigh pt noted, 02/13, 112.4 kg(?), 02/08, 89 kg, 02/03, 80.9 kg, 11/14/22, 82.2 kg(adm wt.)  % Weight Change: unable to determine as current wt. is not available  02/12, no edema noted    Pt is not appropriated for nutrition focus physical as pt was restless, moving around     Pertinent Medications: MEDICATIONS  (STANDING):  apixaban 5 milliGRAM(s) Oral every 12 hours  aspirin  chewable 81 milliGRAM(s) Oral daily  atorvastatin 20 milliGRAM(s) Oral at bedtime  bacitracin   Ointment 1 Application(s) Topical two times a day  budesonide 160 MICROgram(s)/formoterol 4.5 MICROgram(s) Inhaler 2 Puff(s) Inhalation two times a day  chlorhexidine 2% Cloths 1 Application(s) Topical <User Schedule>  cyanocobalamin 1000 MICROGram(s) Oral daily  dextrose 5%. 1000 milliLiter(s) (50 mL/Hr) IV Continuous <Continuous>  dextrose 5%. 1000 milliLiter(s) (100 mL/Hr) IV Continuous <Continuous>  dextrose 50% Injectable 25 Gram(s) IV Push once  dextrose 50% Injectable 12.5 Gram(s) IV Push once  dextrose 50% Injectable 25 Gram(s) IV Push once  diphenhydrAMINE Injectable 50 milliGRAM(s) IV Push once  folic acid 1 milliGRAM(s) Oral daily  glucagon  Injectable 1 milliGRAM(s) IntraMuscular once  glycopyrrolate 1 milliGRAM(s) Oral two times a day  insulin glargine Injectable (LANTUS) 10 Unit(s) SubCutaneous at bedtime  insulin lispro (ADMELOG) corrective regimen sliding scale   SubCutaneous every 6 hours  levETIRAcetam  Solution 1500 milliGRAM(s) Oral two times a day  metoprolol tartrate 25 milliGRAM(s) Enteral Tube two times a day  multivitamin/minerals/iron Oral Solution (CENTRUM) 15 milliLiter(s) Enteral Tube daily  pantoprazole   Suspension 40 milliGRAM(s) Oral before breakfast  polyethylene glycol 3350 17 Gram(s) Oral daily  scopolamine 1 mG/72 Hr(s) Patch 1 Patch Transdermal every 72 hours  senna 2 Tablet(s) Oral at bedtime  valproic  acid Syrup 1000 milliGRAM(s) Oral <User Schedule>  valproic  acid Syrup 750 milliGRAM(s) Oral <User Schedule>  vitamin A &amp; D Ointment 1 Application(s) Topical two times a day    MEDICATIONS  (PRN):  acetaminophen     Tablet .. 650 milliGRAM(s) Oral every 6 hours PRN Temp greater or equal to 38C (100.4F), Mild Pain (1 - 3)  albuterol/ipratropium for Nebulization 3 milliLiter(s) Nebulizer every 6 hours PRN Shortness of Breath and/or Wheezing  aluminum hydroxide/magnesium hydroxide/simethicone Suspension 30 milliLiter(s) Oral every 4 hours PRN Dyspepsia  dextrose Oral Gel 15 Gram(s) Oral once PRN Blood Glucose LESS THAN 70 milliGRAM(s)/deciliter  melatonin 3 milliGRAM(s) Oral at bedtime PRN Insomnia  nitroglycerin     SubLingual 0.4 milliGRAM(s) SubLingual every 5 minutes PRN Chest Pain    Pertinent Labs: 03-08 Na141 mmol/L Glu 156 mg/dL<H> K+ 3.9 mmol/L Cr  0.84 mg/dL BUN 26 mg/dL<H>   CAPILLARY BLOOD GLUCOSE      POCT Blood Glucose.: 133 mg/dL (09 Mar 2023 05:08)  POCT Blood Glucose.: 155 mg/dL (08 Mar 2023 23:10)  POCT Blood Glucose.: 93 mg/dL (08 Mar 2023 17:10)  POCT Blood Glucose.: 179 mg/dL (08 Mar 2023 12:27)    Skin:   03/09, WDL except pressure injury, IAD; perineum  02/17, 02/22, skin tear right buttock, 01/20, left temporal region  Pressure injury x 1  1. 02/22; sacrum; stage IV healing       Estimated Needs:   [x ] no change since previous assessment(11/06 & 11/23)  [ ] recalculated:     Previous Nutrition Diagnosis: (12/23)  [x ] Malnutrition; moderate malnutrition in context of acute illness   Related to: increased protein energy needs in setting of cardiac arrest, acute respiratory failure, s/p ELMER, shock liver, pressure ulcers/wounds  As evidenced by: physical findings of mild muscle wasting & mild/moderate fat depletion   GOAL: pt to meet >75% protein-energy needs via tube feeding  New Goal; pt to meet >75% protein-energy needs via oral/enteral feeding; met   Nutrition Diagnosis is [ x] ongoing; improving   [ ] resolved [ ] not applicable       New Nutrition Diagnosis: [x ] not applicable     Interventions:   Recommend  [ ] Change Diet To:  [ ] Nutrition Supplement  [x ] Nutrition Support; if oral feeding is to be held, recommend resume continuous enteral feeding regimen of Glucerna 1.2 @ goal rate of 60 mL/hr x 24 hrs (1728 pedrito, 86 gm pro, 1166 mL free water, 120% RDIs)   [ x] Other: swallow re-evaluation if concerns of altered swallow     Monitoring and Evaluation:   [ ] PO intake [ x ] Tolerance to diet prescription [ x ] weights [ x ] labs[ x ] follow up per protocol  [ ] other:

## 2023-03-09 NOTE — PROGRESS NOTE ADULT - SUBJECTIVE AND OBJECTIVE BOX
Patient is a 72y old  Male who presents with a chief complaint of s/p cardiac arrest, hypoglycemia (09 Mar 2023 10:00)      SUBJECTIVE / OVERNIGHT EVENTS: Patient seen and examined at bedside. States that he feels well denies any CP, SOB, abd pain and n/v. No acute events overnight.     ROS:  All other review of systems negative    Allergies    No Known Allergies    Intolerances        MEDICATIONS  (STANDING):  apixaban 5 milliGRAM(s) Oral every 12 hours  aspirin  chewable 81 milliGRAM(s) Oral daily  atorvastatin 20 milliGRAM(s) Oral at bedtime  bacitracin   Ointment 1 Application(s) Topical two times a day  budesonide 160 MICROgram(s)/formoterol 4.5 MICROgram(s) Inhaler 2 Puff(s) Inhalation two times a day  chlorhexidine 2% Cloths 1 Application(s) Topical <User Schedule>  cyanocobalamin 1000 MICROGram(s) Oral daily  dextrose 5%. 1000 milliLiter(s) (100 mL/Hr) IV Continuous <Continuous>  dextrose 5%. 1000 milliLiter(s) (50 mL/Hr) IV Continuous <Continuous>  dextrose 50% Injectable 25 Gram(s) IV Push once  dextrose 50% Injectable 12.5 Gram(s) IV Push once  dextrose 50% Injectable 25 Gram(s) IV Push once  diphenhydrAMINE Injectable 50 milliGRAM(s) IV Push once  folic acid 1 milliGRAM(s) Oral daily  glucagon  Injectable 1 milliGRAM(s) IntraMuscular once  glycopyrrolate 1 milliGRAM(s) Oral two times a day  insulin glargine Injectable (LANTUS) 10 Unit(s) SubCutaneous at bedtime  insulin lispro (ADMELOG) corrective regimen sliding scale   SubCutaneous every 6 hours  levETIRAcetam  Solution 1500 milliGRAM(s) Oral two times a day  metoprolol tartrate 25 milliGRAM(s) Enteral Tube two times a day  multivitamin/minerals/iron Oral Solution (CENTRUM) 15 milliLiter(s) Enteral Tube daily  pantoprazole   Suspension 40 milliGRAM(s) Oral before breakfast  polyethylene glycol 3350 17 Gram(s) Oral daily  scopolamine 1 mG/72 Hr(s) Patch 1 Patch Transdermal every 72 hours  senna 2 Tablet(s) Oral at bedtime  valproic  acid Syrup 1000 milliGRAM(s) Oral <User Schedule>  valproic  acid Syrup 750 milliGRAM(s) Oral <User Schedule>  vitamin A &amp; D Ointment 1 Application(s) Topical two times a day    MEDICATIONS  (PRN):  acetaminophen     Tablet .. 650 milliGRAM(s) Oral every 6 hours PRN Temp greater or equal to 38C (100.4F), Mild Pain (1 - 3)  albuterol/ipratropium for Nebulization 3 milliLiter(s) Nebulizer every 6 hours PRN Shortness of Breath and/or Wheezing  aluminum hydroxide/magnesium hydroxide/simethicone Suspension 30 milliLiter(s) Oral every 4 hours PRN Dyspepsia  dextrose Oral Gel 15 Gram(s) Oral once PRN Blood Glucose LESS THAN 70 milliGRAM(s)/deciliter  melatonin 3 milliGRAM(s) Oral at bedtime PRN Insomnia  nitroglycerin     SubLingual 0.4 milliGRAM(s) SubLingual every 5 minutes PRN Chest Pain      Vital Signs Last 24 Hrs  T(C): 36.8 (09 Mar 2023 16:10), Max: 36.8 (08 Mar 2023 23:54)  T(F): 98.2 (09 Mar 2023 16:10), Max: 98.3 (08 Mar 2023 23:54)  HR: 83 (09 Mar 2023 16:10) (67 - 83)  BP: 117/75 (09 Mar 2023 16:10) (117/75 - 138/82)  BP(mean): --  RR: 18 (09 Mar 2023 16:10) (18 - 19)  SpO2: 95% (09 Mar 2023 16:10) (94% - 98%)    Parameters below as of 09 Mar 2023 14:52  Patient On (Oxygen Delivery Method): room air      CAPILLARY BLOOD GLUCOSE      POCT Blood Glucose.: 107 mg/dL (09 Mar 2023 17:02)  POCT Blood Glucose.: 88 mg/dL (09 Mar 2023 12:15)  POCT Blood Glucose.: 133 mg/dL (09 Mar 2023 05:08)  POCT Blood Glucose.: 155 mg/dL (08 Mar 2023 23:10)    I&O's Summary    08 Mar 2023 07:01  -  09 Mar 2023 07:00  --------------------------------------------------------  IN: 0 mL / OUT: 201 mL / NET: -201 mL        PHYSICAL EXAM:  GENERAL: NAD, well-developed  HEAD:  Atraumatic, Normocephalic  EYES: EOMI, PERRLA, conjunctiva and sclera clear  NECK: Supple, No JVD, decannulation dressing c/d/i  CHEST/LUNG:  No wheeze  HEART: Regular rate and rhythm; No murmurs, rubs, or gallops  ABDOMEN: Soft, Nontender, + PEG  EXTREMITIES:  2+ Peripheral Pulses, No clubbing, cyanosis, or edema      LABS:                        11.7   10.54 )-----------( 212      ( 08 Mar 2023 08:36 )             37.3     03-08    141  |  106  |  26<H>  ----------------------------<  156<H>  3.9   |  34<H>  |  0.84    Ca    9.0      08 Mar 2023 08:36                RADIOLOGY & ADDITIONAL TESTS:      Case Discussed with son at bedside

## 2023-03-10 LAB
ANION GAP SERPL CALC-SCNC: 6 MMOL/L — SIGNIFICANT CHANGE UP (ref 5–17)
BUN SERPL-MCNC: 29 MG/DL — HIGH (ref 7–23)
CALCIUM SERPL-MCNC: 8.8 MG/DL — SIGNIFICANT CHANGE UP (ref 8.5–10.1)
CHLORIDE SERPL-SCNC: 105 MMOL/L — SIGNIFICANT CHANGE UP (ref 96–108)
CO2 SERPL-SCNC: 35 MMOL/L — HIGH (ref 22–31)
CREAT SERPL-MCNC: 0.8 MG/DL — SIGNIFICANT CHANGE UP (ref 0.5–1.3)
EGFR: 93 ML/MIN/1.73M2 — SIGNIFICANT CHANGE UP
GLUCOSE BLDC GLUCOMTR-MCNC: 147 MG/DL — HIGH (ref 70–99)
GLUCOSE BLDC GLUCOMTR-MCNC: 152 MG/DL — HIGH (ref 70–99)
GLUCOSE BLDC GLUCOMTR-MCNC: 200 MG/DL — HIGH (ref 70–99)
GLUCOSE BLDC GLUCOMTR-MCNC: 247 MG/DL — HIGH (ref 70–99)
GLUCOSE BLDC GLUCOMTR-MCNC: 94 MG/DL — SIGNIFICANT CHANGE UP (ref 70–99)
GLUCOSE SERPL-MCNC: 78 MG/DL — SIGNIFICANT CHANGE UP (ref 70–99)
HCT VFR BLD CALC: 34.4 % — LOW (ref 39–50)
HGB BLD-MCNC: 10.8 G/DL — LOW (ref 13–17)
MCHC RBC-ENTMCNC: 29.5 PG — SIGNIFICANT CHANGE UP (ref 27–34)
MCHC RBC-ENTMCNC: 31.4 G/DL — LOW (ref 32–36)
MCV RBC AUTO: 94 FL — SIGNIFICANT CHANGE UP (ref 80–100)
NRBC # BLD: 0 /100 WBCS — SIGNIFICANT CHANGE UP (ref 0–0)
PLATELET # BLD AUTO: 242 K/UL — SIGNIFICANT CHANGE UP (ref 150–400)
POTASSIUM SERPL-MCNC: 3.3 MMOL/L — LOW (ref 3.5–5.3)
POTASSIUM SERPL-SCNC: 3.3 MMOL/L — LOW (ref 3.5–5.3)
RBC # BLD: 3.66 M/UL — LOW (ref 4.2–5.8)
RBC # FLD: 14.6 % — HIGH (ref 10.3–14.5)
SODIUM SERPL-SCNC: 146 MMOL/L — HIGH (ref 135–145)
WBC # BLD: 7.9 K/UL — SIGNIFICANT CHANGE UP (ref 3.8–10.5)
WBC # FLD AUTO: 7.9 K/UL — SIGNIFICANT CHANGE UP (ref 3.8–10.5)

## 2023-03-10 PROCEDURE — 99232 SBSQ HOSP IP/OBS MODERATE 35: CPT

## 2023-03-10 RX ADMIN — Medication 1 APPLICATION(S): at 05:54

## 2023-03-10 RX ADMIN — SENNA PLUS 2 TABLET(S): 8.6 TABLET ORAL at 21:32

## 2023-03-10 RX ADMIN — CHLORHEXIDINE GLUCONATE 15 MILLILITER(S): 213 SOLUTION TOPICAL at 06:57

## 2023-03-10 RX ADMIN — BUDESONIDE AND FORMOTEROL FUMARATE DIHYDRATE 2 PUFF(S): 160; 4.5 AEROSOL RESPIRATORY (INHALATION) at 18:34

## 2023-03-10 RX ADMIN — Medication 1 APPLICATION(S): at 18:16

## 2023-03-10 RX ADMIN — LEVETIRACETAM 1500 MILLIGRAM(S): 250 TABLET, FILM COATED ORAL at 05:45

## 2023-03-10 RX ADMIN — Medication 1: at 18:15

## 2023-03-10 RX ADMIN — PANTOPRAZOLE SODIUM 40 MILLIGRAM(S): 20 TABLET, DELAYED RELEASE ORAL at 06:58

## 2023-03-10 RX ADMIN — APIXABAN 5 MILLIGRAM(S): 2.5 TABLET, FILM COATED ORAL at 18:16

## 2023-03-10 RX ADMIN — BUDESONIDE AND FORMOTEROL FUMARATE DIHYDRATE 2 PUFF(S): 160; 4.5 AEROSOL RESPIRATORY (INHALATION) at 05:43

## 2023-03-10 RX ADMIN — Medication 25 MILLIGRAM(S): at 05:43

## 2023-03-10 RX ADMIN — Medication 1000 MILLIGRAM(S): at 21:38

## 2023-03-10 RX ADMIN — ROBINUL 1 MILLIGRAM(S): 0.2 INJECTION INTRAMUSCULAR; INTRAVENOUS at 18:22

## 2023-03-10 RX ADMIN — Medication 25 MILLIGRAM(S): at 18:17

## 2023-03-10 RX ADMIN — ATORVASTATIN CALCIUM 20 MILLIGRAM(S): 80 TABLET, FILM COATED ORAL at 21:37

## 2023-03-10 RX ADMIN — SCOPALAMINE 1 PATCH: 1 PATCH, EXTENDED RELEASE TRANSDERMAL at 06:58

## 2023-03-10 RX ADMIN — Medication 750 MILLIGRAM(S): at 09:40

## 2023-03-10 RX ADMIN — LEVETIRACETAM 1500 MILLIGRAM(S): 250 TABLET, FILM COATED ORAL at 18:16

## 2023-03-10 RX ADMIN — SENNA PLUS 2 TABLET(S): 8.6 TABLET ORAL at 03:19

## 2023-03-10 RX ADMIN — Medication 15 MILLILITER(S): at 12:44

## 2023-03-10 RX ADMIN — ROBINUL 1 MILLIGRAM(S): 0.2 INJECTION INTRAMUSCULAR; INTRAVENOUS at 05:43

## 2023-03-10 RX ADMIN — Medication 1: at 06:27

## 2023-03-10 RX ADMIN — APIXABAN 5 MILLIGRAM(S): 2.5 TABLET, FILM COATED ORAL at 05:44

## 2023-03-10 RX ADMIN — CHLORHEXIDINE GLUCONATE 1 APPLICATION(S): 213 SOLUTION TOPICAL at 05:54

## 2023-03-10 RX ADMIN — Medication 2: at 12:43

## 2023-03-10 RX ADMIN — SCOPALAMINE 1 PATCH: 1 PATCH, EXTENDED RELEASE TRANSDERMAL at 20:41

## 2023-03-10 RX ADMIN — Medication 1 MILLIGRAM(S): at 12:43

## 2023-03-10 RX ADMIN — PREGABALIN 1000 MICROGRAM(S): 225 CAPSULE ORAL at 12:43

## 2023-03-10 RX ADMIN — Medication 1 APPLICATION(S): at 18:36

## 2023-03-10 RX ADMIN — Medication 1 APPLICATION(S): at 05:42

## 2023-03-10 NOTE — PHYSICAL THERAPY INITIAL EVALUATION ADULT - PLANNED THERAPY INTERVENTIONS, PT EVAL
balance training/bed mobility training/gait training/strengthening/transfer training
balance training/bed mobility training/strengthening
balance training/bed mobility training/strengthening
balance training/bed mobility training/gait training/strengthening/transfer training
balance training/bed mobility training/strengthening

## 2023-03-10 NOTE — PHYSICAL THERAPY INITIAL EVALUATION ADULT - FOLLOWS COMMANDS/ANSWERS QUESTIONS, REHAB EVAL
100% of the time/able to follow single-step instructions
unable to follow commands
verbal cueing and manual guidance required for carryover/75% of the time/able to follow single-step instructions
100% of the time/able to follow multistep instructions
able to follow multistep instructions/able to follow single-step instructions
verbal cueing, manual guidance with carryover/100% of the time/able to follow multistep instructions/able to follow single-step instructions
100% of the time
verbal cueing and manual guidance required for carryover/75% of the time/able to follow single-step instructions

## 2023-03-10 NOTE — PHYSICAL THERAPY INITIAL EVALUATION ADULT - STRENGTHENING, PT EVAL
Patient will improve UE/LE strength by 1 grade to improve overall functional mobility including gait, transfers, bed mobility and decrease risk of falls.
pt will increase BUE/LE strength by full grade for ambulation, ADLs, transfers x8 weeks
Patient will improve UE/LE strength by 1 grade to improve overall functional mobility including gait, transfers, bed mobility and decrease risk of falls.
pt will increase BUE/LE strength by full grade for ambulation, ADLs, transfers x8 weeks
Pt will improve muscle strength in all extremities to WFL in 4 weeks to perform bed mobility with CGA

## 2023-03-10 NOTE — PHYSICAL THERAPY INITIAL EVALUATION ADULT - PATIENT/FAMILY AGREES WITH PLAN
wound care recommendations updated
wound care recommendations have been updated
yes
RITA, Wound care RN/yes
sub acute rehab/yes
sub acute rehab, wound care recommendations in place/yes
Sub acute rehab/yes
Sub acute rehab / wound care rec's/yes

## 2023-03-10 NOTE — PROGRESS NOTE ADULT - SUBJECTIVE AND OBJECTIVE BOX
BRANDON CAMARILLO    [facility] [address]    Allergies    No Known Allergies    Intolerances        PAST MEDICAL & SURGICAL HISTORY:  HTN (hypertension)      HLD (hyperlipidemia)      Diabetes mellitus      Lacunar infarction      BPH (benign prostatic hyperplasia)      CHF (congestive heart failure)      Chronic obstructive pulmonary disease, unspecified COPD type      S/P CABG (coronary artery bypass graft)  2014          FAMILY HISTORY:      Home Medications:  aspirin 81 mg oral delayed release tablet: 1 tab(s) orally once a day (12 Jun 2020 17:35)  Liquifilm Tears preserved ophthalmic solution: 1 drop(s) to each affected eye 2 times a day (12 Jun 2020 17:35)      MEDICATIONS  (STANDING):  apixaban 5 milliGRAM(s) Oral every 12 hours  aspirin  chewable 81 milliGRAM(s) Oral daily  atorvastatin 20 milliGRAM(s) Oral at bedtime  bacitracin   Ointment 1 Application(s) Topical two times a day  budesonide 160 MICROgram(s)/formoterol 4.5 MICROgram(s) Inhaler 2 Puff(s) Inhalation two times a day  chlorhexidine 2% Cloths 1 Application(s) Topical <User Schedule>  cyanocobalamin 1000 MICROGram(s) Oral daily  dextrose 5%. 1000 milliLiter(s) (50 mL/Hr) IV Continuous <Continuous>  dextrose 5%. 1000 milliLiter(s) (100 mL/Hr) IV Continuous <Continuous>  dextrose 50% Injectable 25 Gram(s) IV Push once  dextrose 50% Injectable 12.5 Gram(s) IV Push once  dextrose 50% Injectable 25 Gram(s) IV Push once  diphenhydrAMINE Injectable 50 milliGRAM(s) IV Push once  folic acid 1 milliGRAM(s) Oral daily  glucagon  Injectable 1 milliGRAM(s) IntraMuscular once  glycopyrrolate 1 milliGRAM(s) Oral two times a day  insulin glargine Injectable (LANTUS) 10 Unit(s) SubCutaneous at bedtime  insulin lispro (ADMELOG) corrective regimen sliding scale   SubCutaneous every 6 hours  levETIRAcetam  Solution 1500 milliGRAM(s) Oral two times a day  metoprolol tartrate 25 milliGRAM(s) Enteral Tube two times a day  multivitamin/minerals/iron Oral Solution (CENTRUM) 15 milliLiter(s) Enteral Tube daily  pantoprazole   Suspension 40 milliGRAM(s) Oral before breakfast  polyethylene glycol 3350 17 Gram(s) Oral daily  scopolamine 1 mG/72 Hr(s) Patch 1 Patch Transdermal every 72 hours  senna 2 Tablet(s) Oral at bedtime  valproic  acid Syrup 1000 milliGRAM(s) Oral <User Schedule>  valproic  acid Syrup 750 milliGRAM(s) Oral <User Schedule>  vitamin A &amp; D Ointment 1 Application(s) Topical two times a day    MEDICATIONS  (PRN):  acetaminophen     Tablet .. 650 milliGRAM(s) Oral every 6 hours PRN Temp greater or equal to 38C (100.4F), Mild Pain (1 - 3)  albuterol/ipratropium for Nebulization 3 milliLiter(s) Nebulizer every 6 hours PRN Shortness of Breath and/or Wheezing  aluminum hydroxide/magnesium hydroxide/simethicone Suspension 30 milliLiter(s) Oral every 4 hours PRN Dyspepsia  dextrose Oral Gel 15 Gram(s) Oral once PRN Blood Glucose LESS THAN 70 milliGRAM(s)/deciliter  melatonin 3 milliGRAM(s) Oral at bedtime PRN Insomnia  nitroglycerin     SubLingual 0.4 milliGRAM(s) SubLingual every 5 minutes PRN Chest Pain      Diet, Pureed:   Consistent Carbohydrate Evening Snack  Low Sodium  Lacto-Ovo Veg (Accepts Milk Prod., Eggs)  Tube Feeding Modality: Gastrostomy  Glucerna 1.2 Pedrito  Total Volume for 24 Hours (mL): 720  Intermittent  Until Goal Tube Feed Rate (mL per Hour): 60  Tube Feeding Hours ON: 12  Tube Feeding OFF (Hours): 12  Tube Feed Start Time: 19:00  Liquid Protein Supplement     Qty per Day:  1  Supplement Feeding Modality:  Oral  Glucerna Shake Cans or Servings Per Day:  1       Frequency:  Two Times a day (03-01-23 @ 12:14) [Active]          Vital Signs Last 24 Hrs  T(C): 36.4 (10 Mar 2023 04:56), Max: 36.8 (09 Mar 2023 16:10)  T(F): 97.5 (10 Mar 2023 04:56), Max: 98.2 (09 Mar 2023 16:10)  HR: 90 (10 Mar 2023 04:56) (72 - 90)  BP: 132/77 (10 Mar 2023 04:56) (116/70 - 132/77)  BP(mean): --  RR: 18 (10 Mar 2023 04:56) (18 - 19)  SpO2: 96% (10 Mar 2023 04:56) (95% - 98%)    Parameters below as of 10 Mar 2023 04:56  Patient On (Oxygen Delivery Method): room air          03-09-23 @ 07:01  -  03-10-23 @ 07:00  --------------------------------------------------------  IN: 0 mL / OUT: 200 mL / NET: -200 mL              LABS:                        11.7   10.54 )-----------( 212      ( 08 Mar 2023 08:36 )             37.3     03-08    141  |  106  |  26<H>  ----------------------------<  156<H>  3.9   |  34<H>  |  0.84    Ca    9.0      08 Mar 2023 08:36                WBC:  WBC Count: 10.54 K/uL (03-08 @ 08:36)  WBC Count: 11.20 K/uL (03-06 @ 09:18)      MICROBIOLOGY:  RECENT CULTURES:                  Sodium:  Sodium, Serum: 141 mmol/L (03-08 @ 08:36)  Sodium, Serum: 141 mmol/L (03-06 @ 09:18)      0.84 mg/dL 03-08 @ 08:36  0.76 mg/dL 03-06 @ 09:18      Hemoglobin:  Hemoglobin: 11.7 g/dL (03-08 @ 08:36)  Hemoglobin: 11.2 g/dL (03-06 @ 09:18)      Platelets: Platelet Count - Automated: 212 K/uL (03-08 @ 08:36)  Platelet Count - Automated: 191 K/uL (03-06 @ 09:18)              RADIOLOGY & ADDITIONAL STUDIES:      MICROBIOLOGY:  RECENT CULTURES:

## 2023-03-10 NOTE — OCCUPATIONAL THERAPY INITIAL EVALUATION ADULT - DIAGNOSIS, OT EVAL
M62.81 generalized weakness, decreased ADL management and functional transfers/mobility.
M62.81 generalized weakness, decreased ADL management and functional transfers/mobility
M62.81 generalized weakness, decreased ADL management and functional transfers/mobility.

## 2023-03-10 NOTE — PHYSICAL THERAPY INITIAL EVALUATION ADULT - ORIENTATION, REHAB EVAL
person/place/situation
person
responds to name/unable to assess
responds to name/unable to assess
unable to assess
responds to name/person
responds to name
person/place/situation

## 2023-03-10 NOTE — PROGRESS NOTE ADULT - SUBJECTIVE AND OBJECTIVE BOX
INTERVAL HPI/OVERNIGHT EVENTS:  Pt seen and examined at bedside.     Allergies/Intolerance: No Known Allergies      MEDICATIONS  (STANDING):  apixaban 5 milliGRAM(s) Oral every 12 hours  aspirin  chewable 81 milliGRAM(s) Oral daily  atorvastatin 20 milliGRAM(s) Oral at bedtime  bacitracin   Ointment 1 Application(s) Topical two times a day  budesonide 160 MICROgram(s)/formoterol 4.5 MICROgram(s) Inhaler 2 Puff(s) Inhalation two times a day  chlorhexidine 2% Cloths 1 Application(s) Topical <User Schedule>  cyanocobalamin 1000 MICROGram(s) Oral daily  dextrose 5%. 1000 milliLiter(s) (50 mL/Hr) IV Continuous <Continuous>  dextrose 5%. 1000 milliLiter(s) (100 mL/Hr) IV Continuous <Continuous>  dextrose 50% Injectable 25 Gram(s) IV Push once  dextrose 50% Injectable 12.5 Gram(s) IV Push once  dextrose 50% Injectable 25 Gram(s) IV Push once  diphenhydrAMINE Injectable 50 milliGRAM(s) IV Push once  folic acid 1 milliGRAM(s) Oral daily  glucagon  Injectable 1 milliGRAM(s) IntraMuscular once  glycopyrrolate 1 milliGRAM(s) Oral two times a day  insulin glargine Injectable (LANTUS) 10 Unit(s) SubCutaneous at bedtime  insulin lispro (ADMELOG) corrective regimen sliding scale   SubCutaneous every 6 hours  levETIRAcetam  Solution 1500 milliGRAM(s) Oral two times a day  metoprolol tartrate 25 milliGRAM(s) Enteral Tube two times a day  multivitamin/minerals/iron Oral Solution (CENTRUM) 15 milliLiter(s) Enteral Tube daily  pantoprazole   Suspension 40 milliGRAM(s) Oral before breakfast  polyethylene glycol 3350 17 Gram(s) Oral daily  scopolamine 1 mG/72 Hr(s) Patch 1 Patch Transdermal every 72 hours  senna 2 Tablet(s) Oral at bedtime  valproic  acid Syrup 1000 milliGRAM(s) Oral <User Schedule>  valproic  acid Syrup 750 milliGRAM(s) Oral <User Schedule>  vitamin A &amp; D Ointment 1 Application(s) Topical two times a day    MEDICATIONS  (PRN):  acetaminophen     Tablet .. 650 milliGRAM(s) Oral every 6 hours PRN Temp greater or equal to 38C (100.4F), Mild Pain (1 - 3)  albuterol/ipratropium for Nebulization 3 milliLiter(s) Nebulizer every 6 hours PRN Shortness of Breath and/or Wheezing  aluminum hydroxide/magnesium hydroxide/simethicone Suspension 30 milliLiter(s) Oral every 4 hours PRN Dyspepsia  dextrose Oral Gel 15 Gram(s) Oral once PRN Blood Glucose LESS THAN 70 milliGRAM(s)/deciliter  melatonin 3 milliGRAM(s) Oral at bedtime PRN Insomnia  nitroglycerin     SubLingual 0.4 milliGRAM(s) SubLingual every 5 minutes PRN Chest Pain        ROS: all systems reviewed and wnl      PHYSICAL EXAMINATION:  Vital Signs Last 24 Hrs  T(C): 36.4 (10 Mar 2023 10:25), Max: 36.8 (09 Mar 2023 16:10)  T(F): 97.6 (10 Mar 2023 10:25), Max: 98.2 (09 Mar 2023 16:10)  HR: 83 (10 Mar 2023 10:25) (63 - 90)  BP: 126/72 (10 Mar 2023 10:25) (116/70 - 132/77)  BP(mean): --  RR: 19 (10 Mar 2023 10:25) (18 - 19)  SpO2: 96% (10 Mar 2023 10:25) (95% - 98%)    Parameters below as of 10 Mar 2023 10:25  Patient On (Oxygen Delivery Method): room air      CAPILLARY BLOOD GLUCOSE      POCT Blood Glucose.: 94 mg/dL (10 Mar 2023 08:05)  POCT Blood Glucose.: 152 mg/dL (10 Mar 2023 06:23)  POCT Blood Glucose.: 147 mg/dL (10 Mar 2023 00:20)  POCT Blood Glucose.: 161 mg/dL (09 Mar 2023 21:13)  POCT Blood Glucose.: 107 mg/dL (09 Mar 2023 17:02)  POCT Blood Glucose.: 88 mg/dL (09 Mar 2023 12:15)      03-09 @ 07:01  -  03-10 @ 07:00  --------------------------------------------------------  IN: 0 mL / OUT: 200 mL / NET: -200 mL        GENERAL: stable, in bed, trach was removed, PEG feeds in place.   NECK: supple, No JVD  CHEST/LUNG: clear to auscultation bilaterally; no rales, rhonchi, or wheezing b/l  HEART: normal S1, S2  ABDOMEN: BS+, soft, ND, NT   EXTREMITIES:  pulses palpable; no clubbing, cyanosis, or edema b/l LEs    LABS:                        10.8   7.90  )-----------( 242      ( 10 Mar 2023 08:40 )             34.4     03-10    146<H>  |  105  |  29<H>  ----------------------------<  78  3.3<L>   |  35<H>  |  0.80    Ca    8.8      10 Mar 2023 08:40                 INTERVAL HPI/OVERNIGHT EVENTS:  Pt seen and examined at bedside.     Allergies/Intolerance: No Known Allergies      MEDICATIONS  (STANDING):  apixaban 5 milliGRAM(s) Oral every 12 hours  aspirin  chewable 81 milliGRAM(s) Oral daily  atorvastatin 20 milliGRAM(s) Oral at bedtime  bacitracin   Ointment 1 Application(s) Topical two times a day  budesonide 160 MICROgram(s)/formoterol 4.5 MICROgram(s) Inhaler 2 Puff(s) Inhalation two times a day  chlorhexidine 2% Cloths 1 Application(s) Topical <User Schedule>  cyanocobalamin 1000 MICROGram(s) Oral daily  dextrose 5%. 1000 milliLiter(s) (50 mL/Hr) IV Continuous <Continuous>  dextrose 5%. 1000 milliLiter(s) (100 mL/Hr) IV Continuous <Continuous>  dextrose 50% Injectable 25 Gram(s) IV Push once  dextrose 50% Injectable 12.5 Gram(s) IV Push once  dextrose 50% Injectable 25 Gram(s) IV Push once  diphenhydrAMINE Injectable 50 milliGRAM(s) IV Push once  folic acid 1 milliGRAM(s) Oral daily  glucagon  Injectable 1 milliGRAM(s) IntraMuscular once  glycopyrrolate 1 milliGRAM(s) Oral two times a day  insulin glargine Injectable (LANTUS) 10 Unit(s) SubCutaneous at bedtime  insulin lispro (ADMELOG) corrective regimen sliding scale   SubCutaneous every 6 hours  levETIRAcetam  Solution 1500 milliGRAM(s) Oral two times a day  metoprolol tartrate 25 milliGRAM(s) Enteral Tube two times a day  multivitamin/minerals/iron Oral Solution (CENTRUM) 15 milliLiter(s) Enteral Tube daily  pantoprazole   Suspension 40 milliGRAM(s) Oral before breakfast  polyethylene glycol 3350 17 Gram(s) Oral daily  scopolamine 1 mG/72 Hr(s) Patch 1 Patch Transdermal every 72 hours  senna 2 Tablet(s) Oral at bedtime  valproic  acid Syrup 1000 milliGRAM(s) Oral <User Schedule>  valproic  acid Syrup 750 milliGRAM(s) Oral <User Schedule>  vitamin A &amp; D Ointment 1 Application(s) Topical two times a day    MEDICATIONS  (PRN):  acetaminophen     Tablet .. 650 milliGRAM(s) Oral every 6 hours PRN Temp greater or equal to 38C (100.4F), Mild Pain (1 - 3)  albuterol/ipratropium for Nebulization 3 milliLiter(s) Nebulizer every 6 hours PRN Shortness of Breath and/or Wheezing  aluminum hydroxide/magnesium hydroxide/simethicone Suspension 30 milliLiter(s) Oral every 4 hours PRN Dyspepsia  dextrose Oral Gel 15 Gram(s) Oral once PRN Blood Glucose LESS THAN 70 milliGRAM(s)/deciliter  melatonin 3 milliGRAM(s) Oral at bedtime PRN Insomnia  nitroglycerin     SubLingual 0.4 milliGRAM(s) SubLingual every 5 minutes PRN Chest Pain        ROS: all systems reviewed and wnl      PHYSICAL EXAMINATION:  Vital Signs Last 24 Hrs  T(C): 36.4 (10 Mar 2023 10:25), Max: 36.8 (09 Mar 2023 16:10)  T(F): 97.6 (10 Mar 2023 10:25), Max: 98.2 (09 Mar 2023 16:10)  HR: 83 (10 Mar 2023 10:25) (63 - 90)  BP: 126/72 (10 Mar 2023 10:25) (116/70 - 132/77)  BP(mean): --  RR: 19 (10 Mar 2023 10:25) (18 - 19)  SpO2: 96% (10 Mar 2023 10:25) (95% - 98%)    Parameters below as of 10 Mar 2023 10:25  Patient On (Oxygen Delivery Method): room air      CAPILLARY BLOOD GLUCOSE      POCT Blood Glucose.: 94 mg/dL (10 Mar 2023 08:05)  POCT Blood Glucose.: 152 mg/dL (10 Mar 2023 06:23)  POCT Blood Glucose.: 147 mg/dL (10 Mar 2023 00:20)  POCT Blood Glucose.: 161 mg/dL (09 Mar 2023 21:13)  POCT Blood Glucose.: 107 mg/dL (09 Mar 2023 17:02)  POCT Blood Glucose.: 88 mg/dL (09 Mar 2023 12:15)      03-09 @ 07:01  -  03-10 @ 07:00  --------------------------------------------------------  IN: 0 mL / OUT: 200 mL / NET: -200 mL        GENERAL: stable, in bed, trach was removed, PEG feeds in place. Alert, follows simple commands.   NECK: supple, No JVD  CHEST/LUNG: clear to auscultation bilaterally; no rales, rhonchi, or wheezing b/l  HEART: normal S1, S2  ABDOMEN: BS+, soft, ND, NT   EXTREMITIES:  pulses palpable; no clubbing, cyanosis, or edema b/l LEs    LABS:                        10.8   7.90  )-----------( 242      ( 10 Mar 2023 08:40 )             34.4     03-10    146<H>  |  105  |  29<H>  ----------------------------<  78  3.3<L>   |  35<H>  |  0.80    Ca    8.8      10 Mar 2023 08:40

## 2023-03-10 NOTE — OCCUPATIONAL THERAPY INITIAL EVALUATION ADULT - LUE MMT, REHAB EVAL
Grossly less then 3/5 shoulder flexion; Grossly greater then 3/5 strength distally to shoulder.
Grossly equal to or greater then 3/5 strength throughout.
shoulder 0/5, elbow, 2/5, hand 3-/5 grossly

## 2023-03-10 NOTE — OCCUPATIONAL THERAPY INITIAL EVALUATION ADULT - BALANCE TRAINING, PT EVAL
STG- pt will sit at EOB with fair static sitting balance to increase performance with ADLs in 2 weeks
Patient will be able to increase static and dynamic sitting/standing by 1/2 grade in order to participate in self care tasks and functional mobility/transfers within 8 weeks
Patient will be able to increase static and dynamic sitting/standing by 1/2 grade in order to participate in self care tasks and functional mobility/transfers within 8 weeks

## 2023-03-10 NOTE — PHYSICAL THERAPY INITIAL EVALUATION ADULT - REHAB POTENTIAL, PT EVAL
good, to achieve stated therapy goals
good, to achieve stated therapy goals
fair, will monitor progress closely

## 2023-03-10 NOTE — OCCUPATIONAL THERAPY INITIAL EVALUATION ADULT - NS ASR FOLLOW COMMAND OT EVAL
able to follow single-step instructions/unable to follow multi-step instructions
100% of the time
100% of the time

## 2023-03-10 NOTE — PHYSICAL THERAPY INITIAL EVALUATION ADULT - BALANCE TRAINING, PT EVAL
Independent sitting, transfers, standing and ambulation with good balance using appropriate assistive device and prevent falls.
Independent sitting, transfers, standing and ambulation with good balance using appropriate assistive device and prevent falls.
TBA
Pt will be able to maintain static, dynamic sitting balance to fair in 4 weeks to initiate in OOB activities with CGA
pt will increase sitting and standing static and dynamic balance by full grade for safety with ambulation, ADLs and transfers x8 weeks

## 2023-03-10 NOTE — OCCUPATIONAL THERAPY INITIAL EVALUATION ADULT - GENERAL OBSERVATIONS, REHAB EVAL
Pt was encountered OOB in chair with arjo pad underneath patient; NAD, portable telemetry +, alert, followed commands, cooperative; pt had no c/o of pain. Pt is Glendy speaking and requires [employer]  service. Pts son was available through factime/phone call to translate if needed. PT Isi,  Priti and PT supervisior Carlos present. Pt is Glendy speaking and requires [employer]  service. Pts son was called and was able to translate as needed.
Re-eval completed. Pt was encountered supine in bed with pts son present; NAD, alert, trach collar FIO2 35%, cardiac monitor +, PEG + (Stopped by nurse when doing assessment), Hep lock+, followed commands, cooperative; OT observed no pain during session. PT Carlos and PT Dale present.
Pt encountered semisupine in bed in ICU, NAD. PT Jac present. RN Chepe present. Pt intubated on vent(FiO2=30%, PEEP=5), +telemetry, +tube feeding(off), +IV intact, +B/L SCD's donned, +B/L CAIR boots donned. Pt lethargic but arousable, pt noted with generalized weakness, able to squeeze hands but unable to raise UE.

## 2023-03-10 NOTE — PHYSICAL THERAPY INITIAL EVALUATION ADULT - DIAGNOSIS, PT EVAL
muscle weakness, other reduced mobility
decreased strength, endurance, ROM, mobility
impaired skin integrity
muscle weakness
Impaired skin integrity
impaired skin integrity
reduced mobility, impaired skin integrity
decreased strength, endurance, ROM, mobility

## 2023-03-10 NOTE — PHYSICAL THERAPY INITIAL EVALUATION ADULT - ADDITIONAL COMMENTS
As per previous EMR: Patient reports he lives with his son in an apartment, 15 steps to apartment. Patient reports he was previously independent in all ADLs and did not use an assistive device for ambulation.
as per previous EMR: Patient reports he lives with his son in an apartment, 15 steps to apartment. Patient reports he was previously independent in all ADLs and did not use an assistive device for ambulation.
As per previous PT Eval: Patient reports he lives with his son in an apartment, 15 steps to apartment. Patient reports he was previously independent in all ADLs and did not use an assistive device for ambulation.
as per previous EMR: Patient reports he lives with his son in an apartment, 15 steps to apartment. Patient reports he was previously independent in all ADLs and did not use an assistive device for ambulation.
As per previous PT Eval: Patient reports he lives with his son in an apartment, 15 steps to apartment. Patient reports he was previously independent in all ADLs and did not use an assistive device for ambulation.
As per previous PT Eval: Patient reports he lives with his son in an apartment, 15 steps to apartment. Patient reports he was previously independent in all ADLs and did not use an assistive device for ambulation.
as per previous EMR: Patient reports he lives with his son in an apartment, 15 steps to apartment. Patient reports he was previously independent in all ADLs and did not use an assistive device for ambulation.
as per previous PT Eval: Patient reports he lives with his son in an apartment, 15 steps to apartment. Patient reports he was previously independent in all ADLs and did not use an assistive device for ambulation.

## 2023-03-10 NOTE — PHYSICAL THERAPY INITIAL EVALUATION ADULT - IMPAIRMENTS FOUND, PT EVAL
integumentary integrity
aerobic capacity/endurance/integumentary integrity/poor safety awareness/ventilation and respiration/gas exchange
integumentary integrity
integumentary integrity
aerobic capacity/endurance/gait, locomotion, and balance/muscle strength/posture/ventilation and respiration/gas exchange
aerobic capacity/endurance/gait, locomotion, and balance/integumentary integrity/muscle strength/posture/ventilation and respiration/gas exchange
gait, locomotion, and balance/muscle strength
gait, locomotion, and balance/integumentary integrity/muscle strength

## 2023-03-10 NOTE — PROGRESS NOTE ADULT - ASSESSMENT
REVIEW OF SYMPTOMS      Able to give (reliable) ROS  NO     PHYSICAL EXAM    HEENT Unremarkable  atraumatic   RESP Fair air entry EXP prolonged    Harsh breath sound Resp distres mild   CARDIAC S1 S2 No S3     NO JVD    ABDOMEN SOFT BS PRESENT NOT DISTENDED No hepatosplenomegaly   PEDAL EDEMA present No calf tenderness  NO rash       GENERAL DATA .   GO.  12/11/2022 full code       ALLGY.  nka                            WT. ..  12/2/2022 86  BMI. ..    12/2/2022 29                          ICU STAY.  .. 11/29-12/9  COVID.   .. 12/2/2022 scv2 (-)   .. 11/20/2022 scv2 (-)   BEST PRACTICE ISSUES.    HOB ELEVATN. Yes  DVT PPLX.    .. 1/3/2023 apixa 5.2 (vte)    (DVT 12/12 l Subclav throm)  ALEJO PPLX.   INFN PPLX.   .. 11/14 chlorhex 2%   SP SW ANTWAN.         DIET.    ..  12/15 glucerna 1.2 1440 gt   IV fl.    PROCEDURES.  .. 3/7/2023 trach decannulatd   .. 2/15/2023 pmvaslve placd     PATIENT PRESENTATION.  72 m doa 11/14/2022 cac    PMH  PMH CAD s/p PCI with stent 2015 and CABG 2014,   PMH  s/p medtronic PPM,   PMH CVA (lacunar infarct)    HOSPITAL COURSE   .. 1) PEA arrest with ROSC after approximately 5 min 11/14  Now communicative   .. 2) DVT 12/12 l Subclav thromS 12/15/2022 lvnx 80.2 1/3 changed to apixaban 5.2  .. 3) TRACH 12/5/2022 trach  .. 4) PEG12/5/2022 peg   .. 5) Cellulitis hand absc 12/13 mod enterococcus fecalis  staph epi 12/12-12/15/2022  cephalexin 12/15 vanco once  12/16-12/19 zosyn  .. 6) Vent weaning         PROBLEM ASSESSMENT RECOMMENDATIONS.  Trach decannultion.  .. 3/7/2023 2p Pt had tolerated cap for 48 h and had minimal secretions RBAA explained to son and pt and pt decannulated after both approved plan  3/7/2023 5:20 PM 2l 743/54/110  .. 3/8/2023 doing well post decannulatn  .. 3/10/2023 ra po 94%  SLEEP APNEA.  .. 3/5/2023 cpap ordered  .. 3/6/2023 pt unable to tolerate cpap   .. 3/7/2023 cpap dced   VTE  .. On apixaba  INFECTION.  .. w 1/17-1/18-1/20-1/21-2/3-2/8-2/10-2/15-2/27/2023      w 8.3- 7.9 - 6.8 - 7.2 - 8.8  - 10- 10.8- 10.8-7.9    .. pr 1/17-1/20/2023 (-)  (-)   .. 1/16-1/20/2023 Rocephin started by hospitalist dced   CAD.  .. 11/14 asa 81   .. 12/13 metoprolol 25.2   CHF   .. Cr 2/6/2023 Cr .7   .. 11/14/2022 echo mod decr segmental lvsf apical lateral apiccal ant segment are abn takotsubo cmpthy pasp 42 .  .. Monitor for chf has chr hfref per echo   COPD   .. 2/1 duoneb p   .. 12/30 symbicort 160   .. 1/7/2023 glycopyrrolate 1.2   .. 1/9/2023 ipratropium  .. 1/15/2023 scopolamine   .. continue bd ics for copd   Anemia.  .. Hb 1/12-1/14-1/19-1/20-7/21-1/25-2/3-2/8-2/10-2/15-2/27/2023       Hb 9.8- 10 -9.1- 9.8  - 9.2 - 9.5 -10- 10 - 10.5 -10.8- 11   .. target hb 7 (+)  .. monitor   sp cac   .. good neuro recovery awake alert interactive   VTE  .. 1/3/2023 apixa 5.2 (vte)      TIME SPENT   Over 25 minutes aggregate care time spent on encounter; activities included   direct patient care, counseling and/or coordinating care reviewing notes, lab data/ imaging , discussion with multidisciplinary team/ patient  /family and explaining in detail risks, benefits, alternatives  of the recommendations     BRANDON CAMARILLO

## 2023-03-10 NOTE — OCCUPATIONAL THERAPY INITIAL EVALUATION ADULT - LLE MMT, REHAB EVAL
Grossly less then 3/5 hip flexion; Grossly greater then 3/5 strength distally to hip.
grossly 0/5
Grossly equal to or greater then 3/5 strength throughout.

## 2023-03-10 NOTE — PHYSICAL THERAPY INITIAL EVALUATION ADULT - ACTIVE RANGE OF MOTION EXAMINATION, REHAB EVAL
bilateral upper extremity Active ROM was WFL (within functional limits)/bilateral  lower extremity Active ROM was WFL (within functional limits)
pt able to initiate movement to B LE/bilateral upper extremity Active ROM was WFL (within functional limits)
bilateral upper extremity Active ROM was WFL (within functional limits)/bilateral  lower extremity Active ROM was WFL (within functional limits)
BUE AROM shoulder flexion R = 30 degrees, L = 50 degrees
bilateral upper extremity Active ROM was WFL (within functional limits)/bilateral  lower extremity Active ROM was WFL (within functional limits)

## 2023-03-10 NOTE — PHYSICAL THERAPY INITIAL EVALUATION ADULT - BED MOBILITY TRAINING, PT EVAL
Independent in bed mobility- supine<>sit, rolling side<>side observing proper body mechanics, proper positioning, body alignment and precautions.
pt will be able to perform bed mobility independently x8 weeks
Independent in bed mobility- supine<>sit, rolling side<>side observing proper body mechanics, proper positioning, body alignment and precautions.
Pt will be able to move in & out of bed  with CGA in 4 to 6 weeks
pt will be independent with bed mobility x8 weeks

## 2023-03-10 NOTE — PHYSICAL THERAPY INITIAL EVALUATION ADULT - BALANCE DISTURBANCE, IDENTIFIED IMPAIRMENT CONTRIBUTE, REHAB EVAL
impaired motor control/impaired postural control/decreased strength
impaired motor control/impaired postural control/decreased strength
impaired coordination/impaired postural control/decreased strength
decreased strength

## 2023-03-10 NOTE — OCCUPATIONAL THERAPY INITIAL EVALUATION ADULT - RUE MMT, REHAB EVAL
Grossly equal to or greater then 3/5 strength throughout.
shoulder 0/5, elbow, 2/5, hand 3-/5 grossly
Grossly less then 3/5 shoulder flexion; Grossly greater then 3/5 strength distally to shoulder.

## 2023-03-10 NOTE — PHYSICAL THERAPY INITIAL EVALUATION ADULT - NAME OF CLINICIAN
JULIUS Dowling
JULIUS Gresham (covering)
JULIUS and MD Moon
JULIUS Pollock
JULIUS Chaves, ALESSANDRO Khanna
JULIUS Wong
JULIUS Nelly

## 2023-03-10 NOTE — PHYSICAL THERAPY INITIAL EVALUATION ADULT - MANUAL MUSCLE TESTING RESULTS, REHAB EVAL
BLE- 3/5/grossly assessed due to
Fair  strength B, BUE = 3+/5, B LE = 3/5
Fair  strength B, BUE = 3+/5, B LE = 3/5
UE grossly 3+/5, LE grossly 3/5
UE grossly 4/5, LE grossly 3/5
limited mobility, Poor  strength B/not tested due to

## 2023-03-10 NOTE — PHYSICAL THERAPY INITIAL EVALUATION ADULT - ASR WT BEARING STATUS EVAL
no weight-bearing restrictions

## 2023-03-10 NOTE — PHYSICAL THERAPY INITIAL EVALUATION ADULT - TRANSFER TRAINING, PT EVAL
pt will be able to perform sit to stand transfers with appropriate device independently x8 weeks
TBA

## 2023-03-10 NOTE — OCCUPATIONAL THERAPY INITIAL EVALUATION ADULT - STRENGTHENING, PT EVAL
STG- Pt will increase b/l UE strength to 3+/5 to increase performance with ADLs in 4 weeks
Pt will increase BUE/BLE strength to 5/5 to improve functional strength needed to engage in functional tasks by 8 weeks
Pt will increase BUE/BLE strength to 5/5 to improve functional strength needed to engage in functional tasks by 8 weeks

## 2023-03-10 NOTE — OCCUPATIONAL THERAPY INITIAL EVALUATION ADULT - PERTINENT HX OF CURRENT PROBLEM, REHAB EVAL
Mr. Hamilton is a 72 year old male with a PMH of lacunar infarct, CHF (EF ~40% per son), CAD s/p PCI with stent and CABGx3 (~2020), s/p PPM (medtronic), HTN, COPD, DMII on insulin and PO meds, BPH, and CKD (stage II?) presenting to ICU s/p cardiac arrest in ED. Per patient son he has recently been doing well and in his normal state of health, however, yesterday Mr. Hamilton started c/o low blood sugars on glucometer. Pt reportedly continued to take his home medications, including both oral antidiabetic agents and insulin (both long and short acting). Today his son found him minimally responsive hanging off his bed, checked his glucose and it read "low," so he called 911. In ED pt was noted to be hypothermic with glucose noted to be 70 just prior to PEA arrest. Pt was coded for ~5 minutes with 2 epi given with ROSC. Pt intubated at that time. is moving all extremities equally and with reactive, equal pupils. He remained hypothermic and is not requiring vasopressor support. Pending CT Scans. ROS otherwise negative per son besides hypoglycemia, with no known sick contacts. Pt accepted to ICU for further management / care. Pt scheduled for trach and peg placement on 12/5.
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 ACLS for PEA arrest and ROSC returned after 5 minutes. Cardiac arrest possibly secondary to severe hypoglycemia from eating less and not tracking blood sugar carefully. Hospital course complicated by 1) prolonged hypoxemic respiratory failure. Difficult extubation requiring s/p trach and peg 2) left upper extremity/left subclavian DVT and placed on Eliquis 3) multiple infections (Enterococcal faecalis cellulitis, infected left hand hematoma, tracheobronchitis secondary to citrobacter 4) tonic clonic seizure - on Keppra / Depakote - seen by NEUROLOGY 5) fever of 102 on 1/19.
71 yo male w/ pmhx of COPD, CAD sp PCI w/ stent, CABG 2014, HF w/ PeF, 2nd degree heart block, sp PPM, HTN, DM, CKD Stage 3, CVA- lacunar infarcts admitted to ICU originally for cardiac arrest. ACLS for PEA arrest and ROSC returned after 5 minutes. Cardiac arrest possibly secondary to severe hypoglycemia from eating less and not tracking blood sugar carefully. Hospital course complicated by 1) prolonged hypoxemic respiratory failure. sp trach and peg 2) left upper extremity DVT and placed on eliquis 3) multiple infections (enteroccoal facelis cellutis, infected left hand hematoma, tracheobronchitis secondary to citrobacr 4) tonic clonic seizure - on keppra/depakoate- currently undergoing reevaluation by NEUROLOGY 5) fever of 102 on 1/19. INFECTIOUS DISEASE reconsulted and pan culture ordered.

## 2023-03-10 NOTE — OCCUPATIONAL THERAPY INITIAL EVALUATION ADULT - RLE MMT, REHAB EVAL
grossly 0/5
Grossly less then 3/5 hip flexion; Grossly greater then 3/5 strength distally to hip.
Grossly equal to or greater then 3/5 strength throughout.

## 2023-03-10 NOTE — OCCUPATIONAL THERAPY INITIAL EVALUATION ADULT - BED MOBILITY TRAINING, PT EVAL
Patient will be able to perform bed mobility tasks independently, using least restrictive device, within 8 weeks.
STG - pt will perform bed mobility with Moderate assist to increase performance with ADLs in 2 weeks
Patient will be able to perform bed mobility tasks independently, using least restrictive device, within 8 weeks.

## 2023-03-10 NOTE — OCCUPATIONAL THERAPY INITIAL EVALUATION ADULT - RANGE OF MOTION EXAMINATION, UPPER EXTREMITY
AROM shoulder absent, elbow flexion 20 degrees, wrist and hand WFL
BUE AROM shoulder flexion grossly impaired; BUE AROM distally to shoulder grossly WFL.
bilateral UE Active ROM was WFL  (within functional limits)

## 2023-03-10 NOTE — OCCUPATIONAL THERAPY INITIAL EVALUATION ADULT - BALANCE DISTURBANCE, IDENTIFIED IMPAIRMENT CONTRIBUTE, REHAB EVAL
impaired coordination/impaired motor control/impaired postural control/impaired sensory feedback/decreased strength
pain/decreased strength

## 2023-03-10 NOTE — PHYSICAL THERAPY INITIAL EVALUATION ADULT - CRITERIA FOR SKILLED THERAPEUTIC INTERVENTIONS
impairments found/anticipated discharge recommendation
impairments found
impairments found/functional limitations in following categories/risk reduction/prevention/rehab potential/therapy frequency/predicted duration of therapy intervention/anticipated discharge recommendation
impairments found
impairments found/rehab potential/therapy frequency/anticipated discharge recommendation
impairments found/functional limitations in following categories/risk reduction/prevention/rehab potential/therapy frequency/predicted duration of therapy intervention/anticipated discharge recommendation
anticipated equipment needs at discharge/anticipated discharge recommendation
impairments found/rehab potential/therapy frequency/anticipated discharge recommendation

## 2023-03-10 NOTE — PROGRESS NOTE ADULT - ASSESSMENT
73 yo male w/ PMH of COPD, CAD sp PCI w/ stent, CABG 2014, HF w/ PeF, 2nd degree heart block, sp PPM, HTN, DM, CKD Stage 3, CVA- lacunar infarcts admitted to ICU originally for cardiac arrest. ACLS for PEA arrest and ROSC returned after 5 minutes. Cardiac arrest possibly secondary to severe hypoglycemia from eating less and not tracking blood sugar carefully. Hospital course complicated by 1) prolonged hypoxemic respiratory failure. Difficult extubation requiring s/p trach and peg 2) left upper extremity/left subclavian DVT and placed on Eliquis 3) multiple infections (Enterococcal faecalis cellulitis, infected left hand hematoma, tracheobronchitis secondary to citrobacter 4) tonic clonic seizure - on Keppra / Depakote - seen by NEUROLOGY 5) fever of 102 on 1/19.     # Acute/Chronic Respiratory failure w/ Hypoxia and Hypercapnia/ COPD, now trach-vent dependent.  - S/p intubation; failed extubation requiring s/p trach and peg. Trach now removed.    - PMV (Passy [place] Valve) placed on 02/15/2023.  - cont w/ Symbicort, Duonebs prn  - Frequent suction  - PEG feeds for now.    - Pulmonary following, kain recs    # Myoclonic Seizure   - EEG: Myoclonic seizure  - Neurology on board   - cont w/ Keppra and Depakote.  - Myoclonic episode to LLE and LUE noted by his son.  - Mg 2.3 and K 4.1.  - Depakote level 55 (normal ); Keppra level pending.  - D/w Dr Loo (02/18/2023): recommended increase only the evening dose of Depakote from 750 mg to 1000 mg. and continue 750 mg.    # S/p Cardiac Arrest, acute metabolic encephalopathy due to severe hypoglycemia.  - s/p ACLS w/ ROSC after 5 min   - 2nd degree heart block, s/p PPM in place.  - ECHO Takotsubo cardiomyopathy. EF 50-55%, LVH, mild pHTN    - s/p ICU course; Hemodynamically stable now  - cont w/ Metoprolol, ASA and atorvastatin.    # Multiple infectious Disease   - tracheo-bronchitis secondary to citrobacter?? - resolved.  - enterococcal faecalis cellulitis - resolved   - infected left hand hematoma - s/p bedside debridement 12/13 w/ hand surgery   - monitor off abx              # Occlusive Left subclavian thrombosis   - LUE venous duplex : occlusive thrombus in the left mid subclavian vein with partial slow flow detected in the lateral subclavian vein.  - LUE arterial Duplex neg   - cont w/ Eliquis     # DM2   - A1c 8.8%  - cont w/ Lantus   - cont w/ FS monitoring w/ insulin s/s coverage     # HTN, Cad s/p PCI w/ stent/ CABG and HPL.  - cont with ASA, Metoprolol and Atorvastatin     # Anemia of Chronic Disease  - h/h stable, will cont  to monitor   - Hb 10.9, stable.    # Stage 3-4 sacral decubiti wound  - s/p debridement 12/19  -cont w/ wound care as recommended     # Dysphagia/ Moderate Protein Calorie Malnutrition  - Peg tube in place   - continue with tube feeding at goal as tolerated.   - currently getting 1/2 feeds via PEG, 1/2 orally     # Constipation  - cont w/ senna, Miralax prn     DVT prophylaxis: Eliquis.    Disposition: Patient has no insurance and Family in process of getting guardianship, court date 3/12.      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 ACLS for PEA arrest and ROSC returned after 5 minutes. Cardiac arrest possibly secondary to severe hypoglycemia from eating less and not tracking blood sugar carefully. Hospital course complicated by 1) prolonged hypoxemic respiratory failure. Difficult extubation requiring s/p trach and peg 2) left upper extremity/left subclavian DVT and placed on Eliquis 3) multiple infections (Enterococcal faecalis cellulitis, infected left hand hematoma, tracheobronchitis secondary to citrobacter 4) tonic clonic seizure - on Keppra / Depakote - seen by NEUROLOGY 5) fever of 102 on 1/19.     # Acute/Chronic Respiratory failure w/ Hypoxia and Hypercapnia/ COPD, now trach-vent dependent.  - S/p intubation; failed extubation requiring s/p trach and peg. Trach now removed.    - PMV (Passy [place] Valve) placed on 02/15/2023.  - cont w/ Symbicort, Duonebs prn  - Frequent suction  - PEG feeds for now.    - Pulmonary following, kain recs    # Myoclonic Seizure   - EEG: Myoclonic seizure  - Neurology on board   - cont w/ Keppra and Depakote.  - Myoclonic episode to LLE and LUE noted by his son.  - Mg 2.3 and K 4.1.  - Depakote level 55 (normal ); Keppra level pending.  - D/w Dr Loo (02/18/2023): recommended increase only the evening dose of Depakote from 750 mg to 1000 mg. and continue 750 mg.    # S/p Cardiac Arrest, acute metabolic encephalopathy due to severe hypoglycemia.  - s/p ACLS w/ ROSC after 5 min   - 2nd degree heart block, s/p PPM in place.  - ECHO Takotsubo cardiomyopathy. EF 50-55%, LVH, mild pHTN    - s/p ICU course; Hemodynamically stable now  - cont w/ Metoprolol, and atorvastatin.    # Multiple infectious Disease   - tracheo-bronchitis secondary to citrobacter?? - resolved.  - enterococcal faecalis cellulitis - resolved   - infected left hand hematoma - s/p bedside debridement 12/13 w/ hand surgery   - monitor off abx              # Occlusive Left subclavian thrombosis   - LUE venous duplex : occlusive thrombus in the left mid subclavian vein with partial slow flow detected in the lateral subclavian vein.  - LUE arterial Duplex neg   - cont w/ Eliquis     # DM2   - A1c 8.8%  - hold Lantus as BS < 100   - cont w/ FS monitoring w/ insulin s/s coverage     # HTN, Cad s/p PCI w/ stent/ CABG and HPL.  - cont with ASA, Metoprolol and Atorvastatin     # Anemia of Chronic Disease  - h/h stable, will cont  to monitor   - Hb 10.9, stable.    # Stage 3-4 sacral decubiti wound  - s/p debridement 12/19  -cont w/ wound care as recommended     # Dysphagia/ Moderate Protein Calorie Malnutrition  - Peg tube in place   - continue with tube feeding at goal as tolerated.   - currently getting 1/2 feeds via PEG, 1/2 orally      Added free water flush 250 ml tid for mild hypernatremia at 146.     # Constipation  - cont w/ senna, Miralax prn     DVT prophylaxis: Eliquis.    Disposition: Patient has no insurance and Family in process of getting guardianship, court date 3/12.     Stop ASA as HGB mild downward trend. Sodium higher, added free water flush.   No labs this weekend.

## 2023-03-10 NOTE — OCCUPATIONAL THERAPY INITIAL EVALUATION ADULT - RANGE OF MOTION EXAMINATION, LOWER EXTREMITY
bilateral LE AROM absent
BLE AROM hip flexion grossly impaired; BLE AROM distally to hip grossly WFL.
bilateral LE Active ROM was WFL  (within functional limits)

## 2023-03-10 NOTE — PHYSICAL THERAPY INITIAL EVALUATION ADULT - LEVEL OF INDEPENDENCE: SIT/STAND, REHAB EVAL
maximum assist (25% patients effort)
deferred secondary to being soiled
TBA
Secondary to Ventilator was beeping with poor endurance, BLE weakness/unable to perform

## 2023-03-10 NOTE — OCCUPATIONAL THERAPY INITIAL EVALUATION ADULT - PLANNED THERAPY INTERVENTIONS, OT EVAL
ADL retraining/balance training/bed mobility training/cognitive, visual perceptual/ROM/strengthening/stretching/transfer training
balance training/bed mobility training/strengthening/transfer training
ADL retraining/balance training/bed mobility training/strengthening/transfer training

## 2023-03-10 NOTE — PHYSICAL THERAPY INITIAL EVALUATION ADULT - PERTINENT HX OF CURRENT PROBLEM, REHAB EVAL
73 yo male w/ PMH of COPD, CAD sp PCI w/ stent, CABG 2014, HF w/ PeF, 2nd degree heart block, sp PPM, HTN, DM, CKD Stage 3, CVA- lacunar infarcts admitted to ICU originally for cardiac arrest. ACLS for PEA arrest and ROSC returned after 5 minutes. Cardiac arrest possibly secondary to severe hypoglycemia from eating less and not tracking blood sugar carefully. Hospital course complicated by 1) prolonged hypoxemic respiratory failure. Difficult extubation requiring s/p trach and peg 2) left upper extremity/left subclavian DVT and placed on Eliquis 3) multiple infections (Enterococcal faecalis cellulitis, infected left hand hematoma, tracheobronchitis secondary to citrobacter 4) tonic clonic seizure - on Keppra / Depakote - seen by NEUROLOGY 5) fever of 102 on 1/19. 12/2/22 = Initial PT Evaluation; max assist c bed mobility  12/12/22 = supine to sit on edge of bed c max assist  12/16/22 = WC Initial Eval  12/23/22 = PT Re-evaluation; max assist of 1 supine to sitting on edge of bed  12/29/22 = sit to stand c max assist of 2 c Hyacinth Steady and 01/02/23 = sit to stand c max assist of 2 c rolling walker trial  01/21/23 = WC Re-Eval, 01/24/23 = PT Re-Eval; max supine to sitting on edge of bed  02/03/23 = WC Re-Eval, 02/13/23 = Moderate assist of 2 for supine to sitting on edge bed  02/21/23 = WC RE-Eval  03/10/23 = Out of bed via total assist lift; trunk control training in sitting, partial sit to stand c Hyacinth Praneethdy and max assist of 2

## 2023-03-10 NOTE — PHYSICAL THERAPY INITIAL EVALUATION ADULT - GENERAL OBSERVATIONS, REHAB EVAL
Pt found semi supine in bed in NAD, +heplock, +pulse ox, +tele, +trach collar FiO2 35%, son at bedside agreeable to PT session, and RN Judith aware.
pt encountered supine, alert, trch collar, peg in place, continuous pulsox, on clinitron therapeutic mattress. Pressure injury to sacral area continues to improve. Area was cleaned, assessed and re-dressed. Wound is clean, no necrotic tissue present, collagenase use no longer indicated.
pt for wound care re-assessment this time, for sacral pressure injury. Pt able to follow commands, communicates by writing, trach to vent, NAD. Pt required maximal assistance with rolling, able to initiate grasp and maintain it. Pt with stage III pressure injury to sacrum that has progressed to stage IV
Pt received in supine with HOB elevated +aspiration precautions, +PEG feeding tube, +araiza, dressing R hand c/d/i, ace wrap L arm c/d/i, and +trach to vent. Pt's son present throughout session. Pt alert but nonverbal. Pt seen for WC evaluation of sacrum.
Pt was received in supine, vent, pt attempted to say something, difficult to understand , AxOX1
Pt found semi supine in bed in NAD, +heplock, +pulse ox, +tele, +trach collar FiO2 35%, son at bedside agreeable to PT session, and RN Nelly aware.
73 yo male w/ PMH of COPD, CAD sp PCI w/ stent, CABG 2014, HF w/ PeF, 2nd degree heart block, sp PPM, HTN, DM, CKD Stage 3, CVA- lacunar infarcts admitted to ICU originally for cardiac arrest. ACLS for PEA arrest and ROSC returned after 5 minutes. Cardiac arrest possibly secondary to severe hypoglycemia from eating less and not tracking blood sugar carefully. Hospital course complicated by 1) prolonged hypoxemic respiratory failure. Difficult extubation requiring s/p trach and peg 2) left upper extremity/left subclavian DVT and placed on Eliquis 3) multiple infections (Enterococcal faecalis cellulitis, infected left hand hematoma, tracheobronchitis secondary to citrobacter 4) tonic clonic seizure - on Keppra / Depakote - seen by NEUROLOGY 5) fever of 102 on 1/19.
Pt encountered semi-reclined, alert, in critical care unit, iv, pneumatic teds, araiza, iv, ng tube, CAIR boots, ETT in place, NAD. Pt for both wound care and functional assessment at this time.

## 2023-03-10 NOTE — OCCUPATIONAL THERAPY INITIAL EVALUATION ADULT - ADDITIONAL COMMENTS
As per previous OT evaluation, Patient lives with his son in an apartment, 15 steps to apartment. Patient was previously independent in all ADLs and did not use an assistive device for ambulation.
Patient reports he lives with his son in an apartment, 15 steps to apartment. Patient reports he was previously independent in all ADLs and did not use an assistive device for ambulation.
as per previous EMR: Patient reports he lives with his son in an apartment, 15 steps to apartment. Patient reports he was previously independent in all ADLs and did not use an assistive device for ambulation.

## 2023-03-10 NOTE — OCCUPATIONAL THERAPY INITIAL EVALUATION ADULT - TRANSFER TRAINING, PT EVAL
Patient will be able to perform functional transfers, using least restrictive device, independently within 8 weeks.
Patient will be able to perform functional transfers, using least restrictive device, independently within 8 weeks.
LTG-pt will perform functional transfers with adaptive devices independently in 8 weeks

## 2023-03-10 NOTE — PHYSICAL THERAPY INITIAL EVALUATION ADULT - PATIENT/FAMILY/SIGNIFICANT OTHER GOALS STATEMENT, PT EVAL
to improve function, increase mobility
none stated
none stated/written
To get better and go home
Pt wants to get better and go home
none stated
Pt wants to get better and go home
TO get stronger to improve in functional mobility

## 2023-03-11 LAB
GLUCOSE BLDC GLUCOMTR-MCNC: 127 MG/DL — HIGH (ref 70–99)
GLUCOSE BLDC GLUCOMTR-MCNC: 162 MG/DL — HIGH (ref 70–99)
GLUCOSE BLDC GLUCOMTR-MCNC: 223 MG/DL — HIGH (ref 70–99)
GLUCOSE BLDC GLUCOMTR-MCNC: 294 MG/DL — HIGH (ref 70–99)
GLUCOSE BLDC GLUCOMTR-MCNC: 89 MG/DL — SIGNIFICANT CHANGE UP (ref 70–99)

## 2023-03-11 PROCEDURE — 99232 SBSQ HOSP IP/OBS MODERATE 35: CPT

## 2023-03-11 RX ORDER — ASPIRIN/CALCIUM CARB/MAGNESIUM 324 MG
81 TABLET ORAL DAILY
Refills: 0 | Status: DISCONTINUED | OUTPATIENT
Start: 2023-03-11 | End: 2023-03-11

## 2023-03-11 RX ORDER — METOPROLOL TARTRATE 50 MG
12.5 TABLET ORAL
Refills: 0 | Status: DISCONTINUED | OUTPATIENT
Start: 2023-03-11 | End: 2023-03-13

## 2023-03-11 RX ORDER — ASPIRIN/CALCIUM CARB/MAGNESIUM 324 MG
81 TABLET ORAL DAILY
Refills: 0 | Status: DISCONTINUED | OUTPATIENT
Start: 2023-03-11 | End: 2023-03-18

## 2023-03-11 RX ORDER — INSULIN GLARGINE 100 [IU]/ML
10 INJECTION, SOLUTION SUBCUTANEOUS AT BEDTIME
Refills: 0 | Status: DISCONTINUED | OUTPATIENT
Start: 2023-03-11 | End: 2023-03-18

## 2023-03-11 RX ADMIN — Medication 1000 MILLIGRAM(S): at 21:07

## 2023-03-11 RX ADMIN — SCOPALAMINE 1 PATCH: 1 PATCH, EXTENDED RELEASE TRANSDERMAL at 19:57

## 2023-03-11 RX ADMIN — APIXABAN 5 MILLIGRAM(S): 2.5 TABLET, FILM COATED ORAL at 05:33

## 2023-03-11 RX ADMIN — APIXABAN 5 MILLIGRAM(S): 2.5 TABLET, FILM COATED ORAL at 17:42

## 2023-03-11 RX ADMIN — BUDESONIDE AND FORMOTEROL FUMARATE DIHYDRATE 2 PUFF(S): 160; 4.5 AEROSOL RESPIRATORY (INHALATION) at 17:48

## 2023-03-11 RX ADMIN — Medication 81 MILLIGRAM(S): at 11:33

## 2023-03-11 RX ADMIN — LEVETIRACETAM 1500 MILLIGRAM(S): 250 TABLET, FILM COATED ORAL at 05:33

## 2023-03-11 RX ADMIN — Medication 1: at 17:15

## 2023-03-11 RX ADMIN — Medication 750 MILLIGRAM(S): at 08:06

## 2023-03-11 RX ADMIN — Medication 1 APPLICATION(S): at 05:34

## 2023-03-11 RX ADMIN — LEVETIRACETAM 1500 MILLIGRAM(S): 250 TABLET, FILM COATED ORAL at 17:41

## 2023-03-11 RX ADMIN — SCOPALAMINE 1 PATCH: 1 PATCH, EXTENDED RELEASE TRANSDERMAL at 00:21

## 2023-03-11 RX ADMIN — ROBINUL 1 MILLIGRAM(S): 0.2 INJECTION INTRAMUSCULAR; INTRAVENOUS at 05:33

## 2023-03-11 RX ADMIN — POLYETHYLENE GLYCOL 3350 17 GRAM(S): 17 POWDER, FOR SOLUTION ORAL at 11:33

## 2023-03-11 RX ADMIN — CHLORHEXIDINE GLUCONATE 1 APPLICATION(S): 213 SOLUTION TOPICAL at 05:34

## 2023-03-11 RX ADMIN — Medication 1 APPLICATION(S): at 17:48

## 2023-03-11 RX ADMIN — Medication 15 MILLILITER(S): at 11:34

## 2023-03-11 RX ADMIN — Medication 2: at 06:33

## 2023-03-11 RX ADMIN — Medication 1 APPLICATION(S): at 05:37

## 2023-03-11 RX ADMIN — PANTOPRAZOLE SODIUM 40 MILLIGRAM(S): 20 TABLET, DELAYED RELEASE ORAL at 06:34

## 2023-03-11 RX ADMIN — PREGABALIN 1000 MICROGRAM(S): 225 CAPSULE ORAL at 11:33

## 2023-03-11 RX ADMIN — Medication 3: at 00:19

## 2023-03-11 RX ADMIN — BUDESONIDE AND FORMOTEROL FUMARATE DIHYDRATE 2 PUFF(S): 160; 4.5 AEROSOL RESPIRATORY (INHALATION) at 05:37

## 2023-03-11 RX ADMIN — SENNA PLUS 2 TABLET(S): 8.6 TABLET ORAL at 21:08

## 2023-03-11 RX ADMIN — INSULIN GLARGINE 10 UNIT(S): 100 INJECTION, SOLUTION SUBCUTANEOUS at 21:09

## 2023-03-11 RX ADMIN — Medication 12.5 MILLIGRAM(S): at 17:42

## 2023-03-11 RX ADMIN — ATORVASTATIN CALCIUM 20 MILLIGRAM(S): 80 TABLET, FILM COATED ORAL at 21:08

## 2023-03-11 RX ADMIN — SCOPALAMINE 1 PATCH: 1 PATCH, EXTENDED RELEASE TRANSDERMAL at 06:41

## 2023-03-11 RX ADMIN — SCOPALAMINE 1 PATCH: 1 PATCH, EXTENDED RELEASE TRANSDERMAL at 00:18

## 2023-03-11 NOTE — PROGRESS NOTE ADULT - ASSESSMENT
REVIEW OF SYMPTOMS      Able to give (reliable) ROS  NO     PHYSICAL EXAM    HEENT Unremarkable  atraumatic   RESP Fair air entry EXP prolonged    Harsh breath sound Resp distres mild   CARDIAC S1 S2 No S3     NO JVD    ABDOMEN SOFT BS PRESENT NOT DISTENDED No hepatosplenomegaly   PEDAL EDEMA present No calf tenderness  NO rash     GENERAL DATA .   GOC.  12/11/2022 full code       ALLGY.  nka                            WT. ..  12/2/2022 86  BMI. ..    12/2/2022 29                          ICU STAY.  .. 11/29-12/9  COVID.   .. 12/2/2022 scv2 (-)   .. 11/20/2022 scv2 (-)   BEST PRACTICE ISSUES.    HOB ELEVATN. Yes  DVT PPLX.    .. 1/3/2023 apixa 5.2 (vte)    (DVT 12/12 l Subclav throm)  ALEJO PPLX.   INFN PPLX.   .. 11/14 chlorhex 2%   SP SW ANTWAN.         DIET.    ..  12/15 glucerna 1.2 1440 gt   IV fl.    PROCEDURES.  .. 3/7/2023 trach decannulatd     ABGS.  3/7/2023 5:20 PM 2l 743/54/110  1/6/2023 ac 16/450/.3/5 746/49/123     VS/ PO/IO/ VENT/ DRIPS.  3/11/2023 afeb 89 120/80   3/11/2023 ra 965    PATIENT PRESENTATION.  72 m doa 11/14/2022 cac    PMH  PMH CAD s/p PCI with stent 2015 and CABG 2014,   PMH  s/p medtronic PPM,   PMH CVA (lacunar infarct)    HOSPITAL COURSE   .. 1) PEA arrest with ROSC after approximately 5 min 11/14  Now communicative   .. 2) DVT 12/12 l Subclav thromS 12/15/2022 lvnx 80.2 1/3 changed to apixaban 5.2  .. 3) TRACH 12/5/2022 trach  .. 4) PEG12/5/2022 peg   .. 5) Cellulitis hand absc 12/13 mod enterococcus fecalis  staph epi 12/12-12/15/2022  cephalexin 12/15 vanco once  12/16-12/19 zosyn  .. 6) Vent weaning       PROBLEM ASSESSMENT RECOMMENDATIONS.  Trach decannultion.  .. 3/7/2023 2p Pt had tolerated cap for 48 h and had minimal secretions RBAA explained to son and pt and pt decannulated after both approved plan  3/7/2023 5:20 PM 2l 743/54/110  .. 3/8/2023 doing well post decannulatn  .. 3/10/2023 ra po 94%  .. 3/11/2023 doing well post decannulation   SLEEP APNEA.  .. 3/5/2023 cpap ordered  .. 3/6/2023 pt unable to tolerate cpap   .. 3/7/2023 cpap dced   VTE  .. On apixaba  CAD.  .. 11/14 asa 81   .. 12/13 metoprolol 25.2   CHF   .. Cr 2/6/2023 Cr .7   .. 11/14/2022 echo mod decr segmental lvsf apical lateral apiccal ant segment are abn takotsubo cmpthy pasp 42 .  .. Monitor for chf has chr hfref per echo   COPD   .. 2/1 duoneb p   .. 12/30 symbicort 160   .. 1/7/2023 glycopyrrolate 1.2   .. 1/9/2023 ipratropium  .. 1/15/2023 scopolamine   .. continue bd ics for copd   Anemia.  .. Hb 1/12-1/14-1/19-1/20-7/21-1/25-2/3-2/8-2/10-2/15-2/27/2023       Hb 9.8- 10 -9.1- 9.8  - 9.2 - 9.5 -10- 10 - 10.5 -10.8- 11   .. target hb 7 (+)  .. monitor   sp cac   .. good neuro recovery awake alert interactive   VTE  .. 1/3/2023 apixa 5.2 (vte)      TIME SPENT   Over 25 minutes aggregate care time spent on encounter; activities included   direct patient care, counseling and/or coordinating care reviewing notes, lab data/ imaging , discussion with multidisciplinary team/ patient  /family and explaining in detail risks, benefits, alternatives  of the recommendations     BRANDON CAMARILLO

## 2023-03-11 NOTE — PROGRESS NOTE ADULT - ASSESSMENT
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 ACLS for PEA arrest and ROSC returned after 5 minutes. Cardiac arrest possibly secondary to severe hypoglycemia from eating less and not tracking blood sugar carefully. Hospital course complicated by 1) prolonged hypoxemic respiratory failure. Difficult extubation requiring s/p trach and peg 2) left upper extremity/left subclavian DVT and placed on Eliquis 3) multiple infections (Enterococcal faecalis cellulitis, infected left hand hematoma, tracheobronchitis secondary to citrobacter 4) tonic clonic seizure - on Keppra / Depakote - seen by NEUROLOGY 5) fever of 102 on 1/19.     # Acute/Chronic Respiratory failure w/ Hypoxia and Hypercapnia/ COPD, now trach-vent dependent.  - S/p intubation; failed extubation requiring s/p trach and peg. Trach now removed.    - PMV (Passy [place] Valve) placed on 02/15/2023.  - cont w/ Symbicort, Duonebs prn  - Frequent suction  - PEG feeds for now.    - Pulmonary following, kain recs    # Myoclonic Seizure   - EEG: Myoclonic seizure  - Neurology on board   - cont w/ Keppra and Depakote.  - Myoclonic episode to LLE and LUE noted by his son.  - Mg 2.3 and K 4.1.  - Depakote level 55 (normal ); Keppra level pending.  - D/w Dr Loo (02/18/2023): recommended increase only the evening dose of Depakote from 750 mg to 1000 mg. and continue 750 mg.    # S/p Cardiac Arrest, acute metabolic encephalopathy due to severe hypoglycemia.  - s/p ACLS w/ ROSC after 5 min   - 2nd degree heart block, s/p PPM in place.  - ECHO Takotsubo cardiomyopathy. EF 50-55%, LVH, mild pHTN    - s/p ICU course; Hemodynamically stable now  - cont w/ Metoprolol, and atorvastatin.    # Multiple infectious Disease   - tracheo-bronchitis secondary to citrobacter?? - resolved.  - enterococcal faecalis cellulitis - resolved   - infected left hand hematoma - s/p bedside debridement 12/13 w/ hand surgery   - monitor off abx              # Occlusive Left subclavian thrombosis   - LUE venous duplex : occlusive thrombus in the left mid subclavian vein with partial slow flow detected in the lateral subclavian vein.  - LUE arterial Duplex neg   - cont w/ Eliquis     # DM2   - A1c 8.8%  - hold Lantus as BS < 100   - cont w/ FS monitoring w/ insulin s/s coverage     # HTN, Cad s/p PCI w/ stent/ CABG and HPL.  - cont with ASA, Metoprolol and Atorvastatin     # Anemia of Chronic Disease  - h/h stable, will cont  to monitor   - Hb 10.9, stable.    # Stage 3-4 sacral decubiti wound  - s/p debridement 12/19  -cont w/ wound care as recommended     # Dysphagia/ Moderate Protein Calorie Malnutrition  - Peg tube in place   - continue with tube feeding at goal as tolerated.   - currently getting 1/2 feeds via PEG, 1/2 orally      Added free water flush 250 ml tid for mild hypernatremia at 146.     # Constipation  - cont w/ senna, Miralax prn     DVT prophylaxis: Eliquis.    Disposition: Patient has no insurance and Family in process of getting guardianship, court date 3/12.     Stop ASA as HGB mild downward trend. Sodium higher, added free water flush.   No labs this weekend.      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 ACLS for PEA arrest and ROSC returned after 5 minutes. Cardiac arrest possibly secondary to severe hypoglycemia from eating less and not tracking blood sugar carefully. Hospital course complicated by 1) prolonged hypoxemic respiratory failure. Difficult extubation requiring s/p trach and peg 2) left upper extremity/left subclavian DVT and placed on Eliquis 3) multiple infections (Enterococcal faecalis cellulitis, infected left hand hematoma, tracheobronchitis secondary to citrobacter 4) tonic clonic seizure - on Keppra / Depakote - seen by NEUROLOGY 5) fever of 102 on 1/19.     # Acute/Chronic Respiratory failure w/ Hypoxia and Hypercapnia/ COPD, now trach-vent dependent.  - S/p intubation; failed extubation requiring s/p trach and peg. Trach now removed.    - cont w/ Symbicort, Duonebs prn  - Frequent suction  - PEG feeds for now, was advanced to puree diet.    # Myoclonic Seizure   - EEG: Myoclonic seizure  - Neurology on board   - cont w/ Keppra and Depakote.    # S/p Cardiac Arrest, acute metabolic encephalopathy due to severe hypoglycemia.  - s/p ACLS w/ ROSC after 5 min   - 2nd degree heart block, s/p PPM in place.  - ECHO Takotsubo cardiomyopathy. EF 50-55%, LVH, mild pHTN    - s/p ICU course; Hemodynamically stable now  - cont w/ Metoprolol, and atorvastatin. Decreased Lopressor dose today due to low BP.               # Occlusive Left subclavian thrombosis   - LUE venous duplex : occlusive thrombus in the left mid subclavian vein with partial slow flow detected in the lateral subclavian vein.  - LUE arterial Duplex neg   - cont w/ Eliquis     # DM2   - A1c 8.8%  - resume Lantus 10 units per day as glucose 294.   - cont w/ FS monitoring w/ insulin s/s coverage     # HTN, Cad s/p PCI w/ stent/ CABG and HPL.  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   - currently getting 1/2 feeds via PEG, 1/2 orally      Added free water flush 250 ml tid for mild hypernatremia at 146.     # Constipation  - cont w/ senna, Miralax prn     DVT prophylaxis: Eliquis.    Disposition: Patient has no insurance and Family in process of getting guardianship, court date 3/12.     Discharge to rehab once rehab bed is found.

## 2023-03-11 NOTE — PROGRESS NOTE ADULT - SUBJECTIVE AND OBJECTIVE BOX
INTERVAL HPI/OVERNIGHT EVENTS:  Pt seen and examined at bedside.     Allergies/Intolerance: No Known Allergies      MEDICATIONS  (STANDING):  apixaban 5 milliGRAM(s) Oral every 12 hours  atorvastatin 20 milliGRAM(s) Oral at bedtime  bacitracin   Ointment 1 Application(s) Topical two times a day  budesonide 160 MICROgram(s)/formoterol 4.5 MICROgram(s) Inhaler 2 Puff(s) Inhalation two times a day  chlorhexidine 2% Cloths 1 Application(s) Topical <User Schedule>  cyanocobalamin 1000 MICROGram(s) Oral daily  dextrose 5%. 1000 milliLiter(s) (50 mL/Hr) IV Continuous <Continuous>  dextrose 5%. 1000 milliLiter(s) (100 mL/Hr) IV Continuous <Continuous>  dextrose 50% Injectable 25 Gram(s) IV Push once  dextrose 50% Injectable 12.5 Gram(s) IV Push once  dextrose 50% Injectable 25 Gram(s) IV Push once  diphenhydrAMINE Injectable 50 milliGRAM(s) IV Push once  folic acid 1 milliGRAM(s) Oral daily  glucagon  Injectable 1 milliGRAM(s) IntraMuscular once  glycopyrrolate 1 milliGRAM(s) Oral two times a day  insulin lispro (ADMELOG) corrective regimen sliding scale   SubCutaneous every 6 hours  levETIRAcetam  Solution 1500 milliGRAM(s) Oral two times a day  metoprolol tartrate 25 milliGRAM(s) Enteral Tube two times a day  multivitamin/minerals/iron Oral Solution (CENTRUM) 15 milliLiter(s) Enteral Tube daily  pantoprazole   Suspension 40 milliGRAM(s) Oral before breakfast  polyethylene glycol 3350 17 Gram(s) Oral daily  scopolamine 1 mG/72 Hr(s) Patch 1 Patch Transdermal every 72 hours  senna 2 Tablet(s) Oral at bedtime  valproic  acid Syrup 1000 milliGRAM(s) Oral <User Schedule>  valproic  acid Syrup 750 milliGRAM(s) Oral <User Schedule>  vitamin A &amp; D Ointment 1 Application(s) Topical two times a day    MEDICATIONS  (PRN):  acetaminophen     Tablet .. 650 milliGRAM(s) Oral every 6 hours PRN Temp greater or equal to 38C (100.4F), Mild Pain (1 - 3)  albuterol/ipratropium for Nebulization 3 milliLiter(s) Nebulizer every 6 hours PRN Shortness of Breath and/or Wheezing  aluminum hydroxide/magnesium hydroxide/simethicone Suspension 30 milliLiter(s) Oral every 4 hours PRN Dyspepsia  dextrose Oral Gel 15 Gram(s) Oral once PRN Blood Glucose LESS THAN 70 milliGRAM(s)/deciliter  melatonin 3 milliGRAM(s) Oral at bedtime PRN Insomnia        ROS: all systems reviewed and wnl      PHYSICAL EXAMINATION:  Vital Signs Last 24 Hrs  T(C): 36.8 (11 Mar 2023 04:56), Max: 36.8 (10 Mar 2023 23:33)  T(F): 98.2 (11 Mar 2023 04:56), Max: 98.2 (10 Mar 2023 23:33)  HR: 67 (11 Mar 2023 04:56) (67 - 88)  BP: 102/58 (11 Mar 2023 04:56) (102/58 - 134/83)  BP(mean): 100 (10 Mar 2023 16:14) (100 - 100)  RR: 18 (11 Mar 2023 04:56) (18 - 18)  SpO2: 97% (11 Mar 2023 04:56) (94% - 98%)    Parameters below as of 11 Mar 2023 04:56  Patient On (Oxygen Delivery Method): room air      CAPILLARY BLOOD GLUCOSE      POCT Blood Glucose.: 223 mg/dL (11 Mar 2023 06:09)  POCT Blood Glucose.: 294 mg/dL (11 Mar 2023 00:10)  POCT Blood Glucose.: 200 mg/dL (10 Mar 2023 17:16)  POCT Blood Glucose.: 247 mg/dL (10 Mar 2023 12:34)      03-10 @ 07:01  -  03-11 @ 07:00  --------------------------------------------------------  IN: 440 mL / OUT: 0 mL / NET: 440 mL        GENERAL: stable, in chair,trach removed, on dysphagia diet, comfortable on RA. PEG tube in place, no araiza.   NECK: supple, No JVD  CHEST/LUNG: clear to auscultation bilaterally; no rales, rhonchi, or wheezing b/l  HEART: normal S1, S2  ABDOMEN: BS+, soft, ND, NT   EXTREMITIES:  pulses palpable; no clubbing, cyanosis, or edema b/l LEs    LABS:                        10.8   7.90  )-----------( 242      ( 10 Mar 2023 08:40 )             34.4     03-10    146<H>  |  105  |  29<H>  ----------------------------<  78  3.3<L>   |  35<H>  |  0.80    Ca    8.8      10 Mar 2023 08:40

## 2023-03-11 NOTE — PROGRESS NOTE ADULT - SUBJECTIVE AND OBJECTIVE BOX
BRANDON MÉNDEZL    [facility] [address]    Allergies    No Known Allergies    Intolerances        PAST MEDICAL & SURGICAL HISTORY:  HTN (hypertension)      HLD (hyperlipidemia)      Diabetes mellitus      Lacunar infarction      BPH (benign prostatic hyperplasia)      CHF (congestive heart failure)      Chronic obstructive pulmonary disease, unspecified COPD type      S/P CABG (coronary artery bypass graft)  2014          FAMILY HISTORY:      Home Medications:  aspirin 81 mg oral delayed release tablet: 1 tab(s) orally once a day (10 Mar 2023 16:30)  Liquifilm Tears preserved ophthalmic solution: 1 drop(s) to each affected eye 2 times a day (10 Mar 2023 16:30)  tamsulosin 0.4 mg oral capsule: 1 cap(s) orally once a day (10 Mar 2023 16:30)      MEDICATIONS  (STANDING):  apixaban 5 milliGRAM(s) Oral every 12 hours  atorvastatin 20 milliGRAM(s) Oral at bedtime  bacitracin   Ointment 1 Application(s) Topical two times a day  budesonide 160 MICROgram(s)/formoterol 4.5 MICROgram(s) Inhaler 2 Puff(s) Inhalation two times a day  chlorhexidine 2% Cloths 1 Application(s) Topical <User Schedule>  cyanocobalamin 1000 MICROGram(s) Oral daily  dextrose 5%. 1000 milliLiter(s) (50 mL/Hr) IV Continuous <Continuous>  dextrose 5%. 1000 milliLiter(s) (100 mL/Hr) IV Continuous <Continuous>  dextrose 50% Injectable 25 Gram(s) IV Push once  dextrose 50% Injectable 12.5 Gram(s) IV Push once  dextrose 50% Injectable 25 Gram(s) IV Push once  diphenhydrAMINE Injectable 50 milliGRAM(s) IV Push once  folic acid 1 milliGRAM(s) Oral daily  glucagon  Injectable 1 milliGRAM(s) IntraMuscular once  glycopyrrolate 1 milliGRAM(s) Oral two times a day  insulin lispro (ADMELOG) corrective regimen sliding scale   SubCutaneous every 6 hours  levETIRAcetam  Solution 1500 milliGRAM(s) Oral two times a day  metoprolol tartrate 25 milliGRAM(s) Enteral Tube two times a day  multivitamin/minerals/iron Oral Solution (CENTRUM) 15 milliLiter(s) Enteral Tube daily  pantoprazole   Suspension 40 milliGRAM(s) Oral before breakfast  polyethylene glycol 3350 17 Gram(s) Oral daily  scopolamine 1 mG/72 Hr(s) Patch 1 Patch Transdermal every 72 hours  senna 2 Tablet(s) Oral at bedtime  valproic  acid Syrup 1000 milliGRAM(s) Oral <User Schedule>  valproic  acid Syrup 750 milliGRAM(s) Oral <User Schedule>  vitamin A &amp; D Ointment 1 Application(s) Topical two times a day    MEDICATIONS  (PRN):  acetaminophen     Tablet .. 650 milliGRAM(s) Oral every 6 hours PRN Temp greater or equal to 38C (100.4F), Mild Pain (1 - 3)  albuterol/ipratropium for Nebulization 3 milliLiter(s) Nebulizer every 6 hours PRN Shortness of Breath and/or Wheezing  aluminum hydroxide/magnesium hydroxide/simethicone Suspension 30 milliLiter(s) Oral every 4 hours PRN Dyspepsia  dextrose Oral Gel 15 Gram(s) Oral once PRN Blood Glucose LESS THAN 70 milliGRAM(s)/deciliter  melatonin 3 milliGRAM(s) Oral at bedtime PRN Insomnia      Diet, Pureed:   Consistent Carbohydrate Evening Snack  Low Sodium  Lacto-Ovo Veg (Accepts Milk Prod., Eggs)  Tube Feeding Modality: Gastrostomy  Glucerna 1.2 Pedrito  Total Volume for 24 Hours (mL): 720  Intermittent  Until Goal Tube Feed Rate (mL per Hour): 60  Tube Feeding Hours ON: 12  Tube Feeding OFF (Hours): 12  Tube Feed Start Time: 19:00  Liquid Protein Supplement     Qty per Day:  1  Supplement Feeding Modality:  Oral  Glucerna Shake Cans or Servings Per Day:  1       Frequency:  Two Times a day (03-01-23 @ 12:14) [Active]          Vital Signs Last 24 Hrs  T(C): 36.8 (11 Mar 2023 04:56), Max: 36.8 (10 Mar 2023 23:33)  T(F): 98.2 (11 Mar 2023 04:56), Max: 98.2 (10 Mar 2023 23:33)  HR: 67 (11 Mar 2023 04:56) (67 - 88)  BP: 102/58 (11 Mar 2023 04:56) (102/58 - 134/83)  BP(mean): 100 (10 Mar 2023 16:14) (100 - 100)  RR: 18 (11 Mar 2023 04:56) (18 - 19)  SpO2: 97% (11 Mar 2023 04:56) (94% - 98%)    Parameters below as of 11 Mar 2023 04:56  Patient On (Oxygen Delivery Method): room air          03-10-23 @ 07:01  -  03-11-23 @ 07:00  --------------------------------------------------------  IN: 440 mL / OUT: 0 mL / NET: 440 mL              LABS:                        10.8   7.90  )-----------( 242      ( 10 Mar 2023 08:40 )             34.4     03-10    146<H>  |  105  |  29<H>  ----------------------------<  78  3.3<L>   |  35<H>  |  0.80    Ca    8.8      10 Mar 2023 08:40                WBC:  WBC Count: 7.90 K/uL (03-10 @ 08:40)  WBC Count: 10.54 K/uL (03-08 @ 08:36)      MICROBIOLOGY:  RECENT CULTURES:                  Sodium:  Sodium, Serum: 146 mmol/L (03-10 @ 08:40)  Sodium, Serum: 141 mmol/L (03-08 @ 08:36)      0.80 mg/dL 03-10 @ 08:40  0.84 mg/dL 03-08 @ 08:36      Hemoglobin:  Hemoglobin: 10.8 g/dL (03-10 @ 08:40)  Hemoglobin: 11.7 g/dL (03-08 @ 08:36)      Platelets: Platelet Count - Automated: 242 K/uL (03-10 @ 08:40)  Platelet Count - Automated: 212 K/uL (03-08 @ 08:36)              RADIOLOGY & ADDITIONAL STUDIES:      MICROBIOLOGY:  RECENT CULTURES:

## 2023-03-12 LAB
GLUCOSE BLDC GLUCOMTR-MCNC: 119 MG/DL — HIGH (ref 70–99)
GLUCOSE BLDC GLUCOMTR-MCNC: 123 MG/DL — HIGH (ref 70–99)
GLUCOSE BLDC GLUCOMTR-MCNC: 132 MG/DL — HIGH (ref 70–99)
GLUCOSE BLDC GLUCOMTR-MCNC: 142 MG/DL — HIGH (ref 70–99)
GLUCOSE BLDC GLUCOMTR-MCNC: 190 MG/DL — HIGH (ref 70–99)

## 2023-03-12 PROCEDURE — 99232 SBSQ HOSP IP/OBS MODERATE 35: CPT

## 2023-03-12 RX ORDER — INSULIN LISPRO 100/ML
VIAL (ML) SUBCUTANEOUS
Refills: 0 | Status: DISCONTINUED | OUTPATIENT
Start: 2023-03-12 | End: 2023-03-18

## 2023-03-12 RX ORDER — INSULIN LISPRO 100/ML
VIAL (ML) SUBCUTANEOUS AT BEDTIME
Refills: 0 | Status: DISCONTINUED | OUTPATIENT
Start: 2023-03-12 | End: 2023-03-18

## 2023-03-12 RX ADMIN — Medication 12.5 MILLIGRAM(S): at 17:46

## 2023-03-12 RX ADMIN — SENNA PLUS 2 TABLET(S): 8.6 TABLET ORAL at 21:21

## 2023-03-12 RX ADMIN — Medication 15 MILLILITER(S): at 12:24

## 2023-03-12 RX ADMIN — Medication 1 APPLICATION(S): at 17:40

## 2023-03-12 RX ADMIN — CHLORHEXIDINE GLUCONATE 1 APPLICATION(S): 213 SOLUTION TOPICAL at 05:43

## 2023-03-12 RX ADMIN — Medication 1000 MILLIGRAM(S): at 21:22

## 2023-03-12 RX ADMIN — Medication 1 APPLICATION(S): at 05:45

## 2023-03-12 RX ADMIN — APIXABAN 5 MILLIGRAM(S): 2.5 TABLET, FILM COATED ORAL at 17:46

## 2023-03-12 RX ADMIN — APIXABAN 5 MILLIGRAM(S): 2.5 TABLET, FILM COATED ORAL at 05:43

## 2023-03-12 RX ADMIN — Medication 750 MILLIGRAM(S): at 08:29

## 2023-03-12 RX ADMIN — Medication 1 APPLICATION(S): at 06:26

## 2023-03-12 RX ADMIN — PREGABALIN 1000 MICROGRAM(S): 225 CAPSULE ORAL at 12:24

## 2023-03-12 RX ADMIN — ATORVASTATIN CALCIUM 20 MILLIGRAM(S): 80 TABLET, FILM COATED ORAL at 21:22

## 2023-03-12 RX ADMIN — Medication 81 MILLIGRAM(S): at 12:26

## 2023-03-12 RX ADMIN — LEVETIRACETAM 1500 MILLIGRAM(S): 250 TABLET, FILM COATED ORAL at 17:46

## 2023-03-12 RX ADMIN — BUDESONIDE AND FORMOTEROL FUMARATE DIHYDRATE 2 PUFF(S): 160; 4.5 AEROSOL RESPIRATORY (INHALATION) at 05:47

## 2023-03-12 RX ADMIN — Medication 0: at 21:24

## 2023-03-12 RX ADMIN — PANTOPRAZOLE SODIUM 40 MILLIGRAM(S): 20 TABLET, DELAYED RELEASE ORAL at 06:27

## 2023-03-12 RX ADMIN — INSULIN GLARGINE 10 UNIT(S): 100 INJECTION, SOLUTION SUBCUTANEOUS at 21:24

## 2023-03-12 RX ADMIN — LEVETIRACETAM 1500 MILLIGRAM(S): 250 TABLET, FILM COATED ORAL at 05:47

## 2023-03-12 RX ADMIN — Medication 12.5 MILLIGRAM(S): at 05:43

## 2023-03-12 NOTE — PROGRESS NOTE ADULT - SUBJECTIVE AND OBJECTIVE BOX
BRANDON CAMARILLO    [facility] [address]    Allergies    No Known Allergies    Intolerances        PAST MEDICAL & SURGICAL HISTORY:  HTN (hypertension)      HLD (hyperlipidemia)      Diabetes mellitus      Lacunar infarction      BPH (benign prostatic hyperplasia)      CHF (congestive heart failure)      Chronic obstructive pulmonary disease, unspecified COPD type      S/P CABG (coronary artery bypass graft)  2014          FAMILY HISTORY:      Home Medications:  aspirin 81 mg oral delayed release tablet: 1 tab(s) orally once a day (10 Mar 2023 16:30)  Liquifilm Tears preserved ophthalmic solution: 1 drop(s) to each affected eye 2 times a day (10 Mar 2023 16:30)  tamsulosin 0.4 mg oral capsule: 1 cap(s) orally once a day (10 Mar 2023 16:30)      MEDICATIONS  (STANDING):  apixaban 5 milliGRAM(s) Oral every 12 hours  aspirin  chewable 81 milliGRAM(s) Oral daily  atorvastatin 20 milliGRAM(s) Oral at bedtime  bacitracin   Ointment 1 Application(s) Topical two times a day  budesonide 160 MICROgram(s)/formoterol 4.5 MICROgram(s) Inhaler 2 Puff(s) Inhalation two times a day  chlorhexidine 2% Cloths 1 Application(s) Topical <User Schedule>  cyanocobalamin 1000 MICROGram(s) Oral daily  dextrose 5%. 1000 milliLiter(s) (50 mL/Hr) IV Continuous <Continuous>  dextrose 5%. 1000 milliLiter(s) (100 mL/Hr) IV Continuous <Continuous>  dextrose 50% Injectable 25 Gram(s) IV Push once  dextrose 50% Injectable 12.5 Gram(s) IV Push once  dextrose 50% Injectable 25 Gram(s) IV Push once  glucagon  Injectable 1 milliGRAM(s) IntraMuscular once  insulin glargine Injectable (LANTUS) 10 Unit(s) SubCutaneous at bedtime  insulin lispro (ADMELOG) corrective regimen sliding scale   SubCutaneous three times a day before meals  insulin lispro (ADMELOG) corrective regimen sliding scale   SubCutaneous at bedtime  levETIRAcetam  Solution 1500 milliGRAM(s) Oral two times a day  metoprolol tartrate 12.5 milliGRAM(s) Oral two times a day  multivitamin/minerals/iron Oral Solution (CENTRUM) 15 milliLiter(s) Enteral Tube daily  pantoprazole   Suspension 40 milliGRAM(s) Oral before breakfast  polyethylene glycol 3350 17 Gram(s) Oral daily  senna 2 Tablet(s) Oral at bedtime  valproic  acid Syrup 1000 milliGRAM(s) Oral <User Schedule>  valproic  acid Syrup 750 milliGRAM(s) Oral <User Schedule>  vitamin A &amp; D Ointment 1 Application(s) Topical two times a day    MEDICATIONS  (PRN):  acetaminophen     Tablet .. 650 milliGRAM(s) Oral every 6 hours PRN Temp greater or equal to 38C (100.4F), Mild Pain (1 - 3)  albuterol/ipratropium for Nebulization 3 milliLiter(s) Nebulizer every 6 hours PRN Shortness of Breath and/or Wheezing  dextrose Oral Gel 15 Gram(s) Oral once PRN Blood Glucose LESS THAN 70 milliGRAM(s)/deciliter  melatonin 3 milliGRAM(s) Oral at bedtime PRN Insomnia      Diet, Pureed:   Consistent Carbohydrate No Snacks (03-11-23 @ 14:53) [Active]          Vital Signs Last 24 Hrs  T(C): 36.9 (12 Mar 2023 05:01), Max: 37.3 (11 Mar 2023 23:17)  T(F): 98.5 (12 Mar 2023 05:01), Max: 99.2 (11 Mar 2023 23:17)  HR: 85 (12 Mar 2023 05:01) (85 - 92)  BP: 131/67 (12 Mar 2023 05:01) (113/70 - 138/88)  BP(mean): --  RR: 18 (12 Mar 2023 05:01) (18 - 18)  SpO2: 99% (12 Mar 2023 05:01) (95% - 99%)    Parameters below as of 12 Mar 2023 05:01  Patient On (Oxygen Delivery Method): room air          03-11-23 @ 06:01  -  03-12-23 @ 07:00  --------------------------------------------------------  IN: 490 mL / OUT: 0 mL / NET: 490 mL              LABS:                    WBC:  WBC Count: 7.90 K/uL (03-10 @ 08:40)      MICROBIOLOGY:  RECENT CULTURES:                  Sodium:  Sodium, Serum: 146 mmol/L (03-10 @ 08:40)      0.80 mg/dL 03-10 @ 08:40      Hemoglobin:  Hemoglobin: 10.8 g/dL (03-10 @ 08:40)      Platelets: Platelet Count - Automated: 242 K/uL (03-10 @ 08:40)              RADIOLOGY & ADDITIONAL STUDIES:      MICROBIOLOGY:  RECENT CULTURES:

## 2023-03-12 NOTE — PROGRESS NOTE ADULT - SUBJECTIVE AND OBJECTIVE BOX
INTERVAL HPI/OVERNIGHT EVENTS:  Pt seen and examined at bedside.     Allergies/Intolerance: No Known Allergies      MEDICATIONS  (STANDING):  apixaban 5 milliGRAM(s) Oral every 12 hours  aspirin  chewable 81 milliGRAM(s) Oral daily  atorvastatin 20 milliGRAM(s) Oral at bedtime  bacitracin   Ointment 1 Application(s) Topical two times a day  budesonide 160 MICROgram(s)/formoterol 4.5 MICROgram(s) Inhaler 2 Puff(s) Inhalation two times a day  chlorhexidine 2% Cloths 1 Application(s) Topical <User Schedule>  cyanocobalamin 1000 MICROGram(s) Oral daily  dextrose 5%. 1000 milliLiter(s) (100 mL/Hr) IV Continuous <Continuous>  dextrose 5%. 1000 milliLiter(s) (50 mL/Hr) IV Continuous <Continuous>  dextrose 50% Injectable 25 Gram(s) IV Push once  dextrose 50% Injectable 12.5 Gram(s) IV Push once  dextrose 50% Injectable 25 Gram(s) IV Push once  glucagon  Injectable 1 milliGRAM(s) IntraMuscular once  insulin glargine Injectable (LANTUS) 10 Unit(s) SubCutaneous at bedtime  insulin lispro (ADMELOG) corrective regimen sliding scale   SubCutaneous three times a day before meals  insulin lispro (ADMELOG) corrective regimen sliding scale   SubCutaneous at bedtime  levETIRAcetam  Solution 1500 milliGRAM(s) Oral two times a day  metoprolol tartrate 12.5 milliGRAM(s) Oral two times a day  multivitamin/minerals/iron Oral Solution (CENTRUM) 15 milliLiter(s) Enteral Tube daily  pantoprazole   Suspension 40 milliGRAM(s) Oral before breakfast  polyethylene glycol 3350 17 Gram(s) Oral daily  senna 2 Tablet(s) Oral at bedtime  valproic  acid Syrup 1000 milliGRAM(s) Oral <User Schedule>  valproic  acid Syrup 750 milliGRAM(s) Oral <User Schedule>  vitamin A &amp; D Ointment 1 Application(s) Topical two times a day    MEDICATIONS  (PRN):  acetaminophen     Tablet .. 650 milliGRAM(s) Oral every 6 hours PRN Temp greater or equal to 38C (100.4F), Mild Pain (1 - 3)  albuterol/ipratropium for Nebulization 3 milliLiter(s) Nebulizer every 6 hours PRN Shortness of Breath and/or Wheezing  dextrose Oral Gel 15 Gram(s) Oral once PRN Blood Glucose LESS THAN 70 milliGRAM(s)/deciliter  melatonin 3 milliGRAM(s) Oral at bedtime PRN Insomnia        ROS: all systems reviewed and wnl      PHYSICAL EXAMINATION:  Vital Signs Last 24 Hrs  T(C): 36.9 (12 Mar 2023 05:01), Max: 37.3 (11 Mar 2023 23:17)  T(F): 98.5 (12 Mar 2023 05:01), Max: 99.2 (11 Mar 2023 23:17)  HR: 85 (12 Mar 2023 05:01) (85 - 91)  BP: 131/67 (12 Mar 2023 05:01) (113/70 - 131/67)  BP(mean): --  RR: 18 (12 Mar 2023 05:01) (18 - 18)  SpO2: 99% (12 Mar 2023 05:01) (96% - 99%)    Parameters below as of 12 Mar 2023 05:01  Patient On (Oxygen Delivery Method): room air      CAPILLARY BLOOD GLUCOSE      POCT Blood Glucose.: 123 mg/dL (12 Mar 2023 06:31)  POCT Blood Glucose.: 119 mg/dL (12 Mar 2023 00:47)  POCT Blood Glucose.: 89 mg/dL (11 Mar 2023 20:49)  POCT Blood Glucose.: 162 mg/dL (11 Mar 2023 16:21)      03-11 @ 06:01  -  03-12 @ 07:00  --------------------------------------------------------  IN: 490 mL / OUT: 0 mL / NET: 490 mL        GENERAL: stable, in chair, comfortable, trach and araiza removed. PEG in place, not in use.   NECK: supple, No JVD  CHEST/LUNG: clear to auscultation bilaterally; no rales, rhonchi, or wheezing b/l  HEART: normal S1, S2  ABDOMEN: BS+, soft, ND, NT   EXTREMITIES:  pulses palpable; no clubbing, cyanosis, or edema b/l LEs    LABS:

## 2023-03-12 NOTE — PROGRESS NOTE ADULT - ASSESSMENT
REVIEW OF SYMPTOMS      Able to give (reliable) ROS  NO     PHYSICAL EXAM    HEENT Unremarkable  atraumatic   RESP Fair air entry EXP prolonged    Harsh breath sound Resp distres mild   CARDIAC S1 S2 No S3     NO JVD    ABDOMEN SOFT BS PRESENT NOT DISTENDED No hepatosplenomegaly   PEDAL EDEMA present No calf tenderness  NO rash       PATIENT PRESENTATION.  72 m doa 11/14/2022 cac    PMH  PMH CAD s/p PCI with stent 2015 and CABG 2014,   PMH  s/p medtronic PPM,   PMH CVA (lacunar infarct)    HOSPITAL COURSE   .. 1) PEA arrest with ROSC after approximately 5 min 11/14  Now communicative   .. 2) DVT 12/12 l Subclav thromS 12/15/2022 lvnx 80.2 1/3 changed to apixaban 5.2  .. 3) TRACH 12/5/2022 trach  .. 4) PEG12/5/2022 peg   .. 5) Cellulitis hand absc 12/13 mod enterococcus fecalis  staph epi 12/12-12/15/2022  cephalexin 12/15 vanco once  12/16-12/19 zosyn  .. 6) Vent weaning         PROBLEM ASSESSMENT RECOMMENDATIONS.  Trach decannultion.  .. 3/7/2023 2p Pt had tolerated cap for 48 h and had minimal secretions RBAA explained to son and pt and pt decannulated after both approved plan  3/7/2023 5:20 PM 2l 743/54/110  .. 3/8/2023 doing well post decannulatn  .. 3/10/2023 ra po 94%  .. 3/11/2023 doing well post decannulation   SLEEP APNEA.  .. 3/5/2023 cpap ordered  .. 3/6/2023 pt unable to tolerate cpap   .. 3/7/2023 cpap dced   VTE  .. On apixaba  CAD.  .. 11/14 asa 81   .. 12/13 metoprolol 25.2   CHF   .. Cr 2/6/2023 Cr .7   .. 11/14/2022 echo mod decr segmental lvsf apical lateral apiccal ant segment are abn takotsubo cmpthy pasp 42 .  .. Monitor for chf has chr hfref per echo   COPD   .. 2/1 duoneb p   .. 12/30 symbicort 160   .. 1/7/2023 glycopyrrolate 1.2   .. 1/9/2023 ipratropium  .. 1/15/2023 scopolamine   .. continue bd ics for copd   Anemia.  .. Hb 1/12-1/14-1/19-1/20-7/21-1/25-2/3-2/8-2/10-2/15-2/27/2023       Hb 9.8- 10 -9.1- 9.8  - 9.2 - 9.5 -10- 10 - 10.5 -10.8- 11   .. target hb 7 (+)  .. monitor   sp cac   .. good neuro recovery awake alert interactive   VTE  .. 1/3/2023 apixa 5.2 (vte)      TIME SPENT   Over 25 minutes aggregate care time spent on encounter; activities included   direct patient care, counseling and/or coordinating care reviewing notes, lab data/ imaging , discussion with multidisciplinary team/ patient  /family and explaining in detail risks, benefits, alternatives  of the recommendations     BRANDON CAMARILLO

## 2023-03-12 NOTE — PROGRESS NOTE ADULT - ASSESSMENT
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 ACLS for PEA arrest and ROSC returned after 5 minutes. Cardiac arrest possibly secondary to severe hypoglycemia from eating less and not tracking blood sugar carefully. Hospital course complicated by 1) prolonged hypoxemic respiratory failure. Difficult extubation requiring s/p trach and peg 2) left upper extremity/left subclavian DVT and placed on Eliquis 3) multiple infections (Enterococcal faecalis cellulitis, infected left hand hematoma, tracheobronchitis secondary to citrobacter 4) tonic clonic seizure - on Keppra / Depakote - seen by NEUROLOGY 5) fever of 102 on 1/19.     # Acute/Chronic Respiratory failure w/ Hypoxia and Hypercapnia/ COPD, now trach-vent dependent.  - S/p intubation; failed extubation requiring s/p trach and peg. Trach now removed.    - cont w/ Symbicort, Duonebs prn  - Frequent suction  - PEG feeds for now, was advanced to puree diet.    # Myoclonic Seizure   - EEG: Myoclonic seizure  - Neurology on board   - cont w/ Keppra and Depakote.    # S/p Cardiac Arrest, acute metabolic encephalopathy due to severe hypoglycemia.  - s/p ACLS w/ ROSC after 5 min   - 2nd degree heart block, s/p PPM in place.  - ECHO Takotsubo cardiomyopathy. EF 50-55%, LVH, mild pHTN    - s/p ICU course; Hemodynamically stable now  - cont w/ Metoprolol, and atorvastatin. Decreased Lopressor dose today due to low BP.               # Occlusive Left subclavian thrombosis   - LUE venous duplex : occlusive thrombus in the left mid subclavian vein with partial slow flow detected in the lateral subclavian vein.  - LUE arterial Duplex neg   - cont w/ Eliquis     # DM2   - A1c 8.8%  - resume Lantus 10 units per day as glucose 294.   - cont w/ FS monitoring w/ insulin s/s coverage     # HTN, Cad s/p PCI w/ stent/ CABG and HPL.  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   - currently getting 1/2 feeds via PEG, 1/2 orally      Added free water flush 250 ml tid for mild hypernatremia at 146.     # Constipation  - cont w/ senna, Miralax prn     DVT prophylaxis: Eliquis.    Disposition: Patient has no insurance and Family in process of getting guardianship, court date 3/12.     Discharge to rehab once rehab bed is found.  73 yo male w/ PMH of COPD, CAD sp PCI w/ stent, CABG 2014, HF w/ PeF, 2nd degree heart block, sp PPM, HTN, DM, CKD Stage 3, CVA- lacunar infarcts admitted to ICU originally for cardiac arrest. ACLS for PEA arrest and ROSC returned after 5 minutes. Cardiac arrest possibly secondary to severe hypoglycemia from eating less and not tracking blood sugar carefully. Hospital course complicated by 1) prolonged hypoxemic respiratory failure. Difficult extubation requiring s/p trach and peg 2) left upper extremity/left subclavian DVT and placed on Eliquis 3) multiple infections (Enterococcal faecalis cellulitis, infected left hand hematoma, tracheobronchitis secondary to citrobacter 4) tonic clonic seizure - on Keppra / Depakote - seen by NEUROLOGY 5) fever of 102 on 1/19.     # Acute/Chronic Respiratory failure w/ Hypoxia and Hypercapnia/ COPD, now trach-vent dependent.  - S/p intubation; failed extubation requiring s/p trach and peg. Trach now removed.    - cont w/ Symbicort, Duonebs prn  - Frequent suction, PEG feeds now on hold, was advanced to puree diet.    # Myoclonic Seizure   - EEG: Myoclonic seizure, Neurology on board, cont w/ Keppra and Depakote.    # S/p Cardiac Arrest, acute metabolic encephalopathy due to severe hypoglycemia.  - s/p ACLS w/ ROSC after 5 min   - 2nd degree heart block, s/p PPM in place.  - ECHO Takotsubo cardiomyopathy. EF 50-55%, LVH, mild pHTN    - s/p ICU course; Hemodynamically stable now  - cont w/ Metoprolol, and atorvastatin. Decreased Lopressor dose today due to low BP.  BP, HR better today.              # Occlusive Left subclavian thrombosis   - LUE venous duplex : occlusive thrombus in the left mid subclavian vein with partial slow flow detected in the lateral subclavian vein.  - LUE arterial Duplex neg, cont w/ Eliquis     # DM2   - A1c 8.8%, resume Lantus 10 units per day, glucose improved to 132. Cont w/ FS monitoring w/ insulin s/s coverage     # HTN, Cad s/p PCI w/ stent/ CABG and HPL.  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 Tube feeding on hold, puree diet. Added free water flush 250 ml tid for mild hypernatremia at 146.  Check CBC, SMA-7 weekly.     Disposition: Patient has no insurance and Family in process of getting guardianship, court date 3/12.     Discharge to rehab once rehab bed is found.

## 2023-03-13 ENCOUNTER — TRANSCRIPTION ENCOUNTER (OUTPATIENT)
Age: 73
End: 2023-03-13

## 2023-03-13 LAB
GLUCOSE BLDC GLUCOMTR-MCNC: 110 MG/DL — HIGH (ref 70–99)
GLUCOSE BLDC GLUCOMTR-MCNC: 120 MG/DL — HIGH (ref 70–99)
GLUCOSE BLDC GLUCOMTR-MCNC: 147 MG/DL — HIGH (ref 70–99)
GLUCOSE BLDC GLUCOMTR-MCNC: 94 MG/DL — SIGNIFICANT CHANGE UP (ref 70–99)

## 2023-03-13 PROCEDURE — 99232 SBSQ HOSP IP/OBS MODERATE 35: CPT

## 2023-03-13 RX ORDER — INSULIN GLARGINE 100 [IU]/ML
5 INJECTION, SOLUTION SUBCUTANEOUS ONCE
Refills: 0 | Status: COMPLETED | OUTPATIENT
Start: 2023-03-13 | End: 2023-03-13

## 2023-03-13 RX ORDER — METOPROLOL TARTRATE 50 MG
25 TABLET ORAL
Refills: 0 | Status: DISCONTINUED | OUTPATIENT
Start: 2023-03-13 | End: 2023-03-18

## 2023-03-13 RX ADMIN — SCOPALAMINE 1 PATCH: 1 PATCH, EXTENDED RELEASE TRANSDERMAL at 19:30

## 2023-03-13 RX ADMIN — Medication 25 MILLIGRAM(S): at 17:37

## 2023-03-13 RX ADMIN — APIXABAN 5 MILLIGRAM(S): 2.5 TABLET, FILM COATED ORAL at 17:37

## 2023-03-13 RX ADMIN — CHLORHEXIDINE GLUCONATE 1 APPLICATION(S): 213 SOLUTION TOPICAL at 05:36

## 2023-03-13 RX ADMIN — PREGABALIN 1000 MICROGRAM(S): 225 CAPSULE ORAL at 11:26

## 2023-03-13 RX ADMIN — Medication 1000 MILLIGRAM(S): at 22:03

## 2023-03-13 RX ADMIN — LEVETIRACETAM 1500 MILLIGRAM(S): 250 TABLET, FILM COATED ORAL at 17:37

## 2023-03-13 RX ADMIN — ATORVASTATIN CALCIUM 20 MILLIGRAM(S): 80 TABLET, FILM COATED ORAL at 22:04

## 2023-03-13 RX ADMIN — Medication 15 MILLILITER(S): at 11:26

## 2023-03-13 RX ADMIN — BUDESONIDE AND FORMOTEROL FUMARATE DIHYDRATE 2 PUFF(S): 160; 4.5 AEROSOL RESPIRATORY (INHALATION) at 17:46

## 2023-03-13 RX ADMIN — LEVETIRACETAM 1500 MILLIGRAM(S): 250 TABLET, FILM COATED ORAL at 05:35

## 2023-03-13 RX ADMIN — APIXABAN 5 MILLIGRAM(S): 2.5 TABLET, FILM COATED ORAL at 05:32

## 2023-03-13 RX ADMIN — INSULIN GLARGINE 5 UNIT(S): 100 INJECTION, SOLUTION SUBCUTANEOUS at 22:30

## 2023-03-13 RX ADMIN — SENNA PLUS 2 TABLET(S): 8.6 TABLET ORAL at 22:04

## 2023-03-13 RX ADMIN — SCOPALAMINE 1 PATCH: 1 PATCH, EXTENDED RELEASE TRANSDERMAL at 08:33

## 2023-03-13 RX ADMIN — Medication 1 APPLICATION(S): at 05:32

## 2023-03-13 RX ADMIN — Medication 1 APPLICATION(S): at 05:35

## 2023-03-13 RX ADMIN — Medication 1 APPLICATION(S): at 17:38

## 2023-03-13 RX ADMIN — Medication 81 MILLIGRAM(S): at 11:26

## 2023-03-13 RX ADMIN — BUDESONIDE AND FORMOTEROL FUMARATE DIHYDRATE 2 PUFF(S): 160; 4.5 AEROSOL RESPIRATORY (INHALATION) at 05:31

## 2023-03-13 RX ADMIN — Medication 750 MILLIGRAM(S): at 08:24

## 2023-03-13 RX ADMIN — Medication 12.5 MILLIGRAM(S): at 05:33

## 2023-03-13 RX ADMIN — PANTOPRAZOLE SODIUM 40 MILLIGRAM(S): 20 TABLET, DELAYED RELEASE ORAL at 08:24

## 2023-03-13 NOTE — DISCHARGE NOTE PROVIDER - CARE PROVIDER_API CALL
Primary Care Doctor,   Phone: (   )    -  Fax: (   )    -  Follow Up Time:     Neurologist,   Phone: (   )    -  Fax: (   )    -  Follow Up Time:    Primary Care Doctor,   Phone: (   )    -  Fax: (   )    -  Follow Up Time:     Neurologist,   Phone: (   )    -  Fax: (   )    -  Follow Up Time:     Your cardiologist,   Phone: (   )    -  Fax: (   )    -  Follow Up Time:

## 2023-03-13 NOTE — DISCHARGE NOTE PROVIDER - DISCHARGE DIET
Consistent Carbohydrate Diabetic Diets/Pureed Diet Low Sodium Diet/Consistent Carbohydrate Diabetic Diets/Pureed Diet

## 2023-03-13 NOTE — PROGRESS NOTE ADULT - SUBJECTIVE AND OBJECTIVE BOX
BRANDON CAMARILLO    [facility] [address]    Allergies    No Known Allergies    Intolerances        PAST MEDICAL & SURGICAL HISTORY:  HTN (hypertension)      HLD (hyperlipidemia)      Diabetes mellitus      Lacunar infarction      BPH (benign prostatic hyperplasia)      CHF (congestive heart failure)      Chronic obstructive pulmonary disease, unspecified COPD type      S/P CABG (coronary artery bypass graft)  2014          FAMILY HISTORY:      Home Medications:  aspirin 81 mg oral delayed release tablet: 1 tab(s) orally once a day (10 Mar 2023 16:30)  Liquifilm Tears preserved ophthalmic solution: 1 drop(s) to each affected eye 2 times a day (10 Mar 2023 16:30)  tamsulosin 0.4 mg oral capsule: 1 cap(s) orally once a day (10 Mar 2023 16:30)      MEDICATIONS  (STANDING):  apixaban 5 milliGRAM(s) Oral every 12 hours  aspirin  chewable 81 milliGRAM(s) Oral daily  atorvastatin 20 milliGRAM(s) Oral at bedtime  bacitracin   Ointment 1 Application(s) Topical two times a day  budesonide 160 MICROgram(s)/formoterol 4.5 MICROgram(s) Inhaler 2 Puff(s) Inhalation two times a day  chlorhexidine 2% Cloths 1 Application(s) Topical <User Schedule>  cyanocobalamin 1000 MICROGram(s) Oral daily  dextrose 5%. 1000 milliLiter(s) (50 mL/Hr) IV Continuous <Continuous>  dextrose 5%. 1000 milliLiter(s) (100 mL/Hr) IV Continuous <Continuous>  dextrose 50% Injectable 25 Gram(s) IV Push once  dextrose 50% Injectable 12.5 Gram(s) IV Push once  dextrose 50% Injectable 25 Gram(s) IV Push once  glucagon  Injectable 1 milliGRAM(s) IntraMuscular once  insulin glargine Injectable (LANTUS) 10 Unit(s) SubCutaneous at bedtime  insulin lispro (ADMELOG) corrective regimen sliding scale   SubCutaneous three times a day before meals  insulin lispro (ADMELOG) corrective regimen sliding scale   SubCutaneous at bedtime  levETIRAcetam  Solution 1500 milliGRAM(s) Oral two times a day  metoprolol tartrate 25 milliGRAM(s) Oral two times a day  multivitamin/minerals/iron Oral Solution (CENTRUM) 15 milliLiter(s) Enteral Tube daily  pantoprazole   Suspension 40 milliGRAM(s) Oral before breakfast  polyethylene glycol 3350 17 Gram(s) Oral daily  senna 2 Tablet(s) Oral at bedtime  valproic  acid Syrup 1000 milliGRAM(s) Oral <User Schedule>  valproic  acid Syrup 750 milliGRAM(s) Oral <User Schedule>  vitamin A &amp; D Ointment 1 Application(s) Topical two times a day    MEDICATIONS  (PRN):  acetaminophen     Tablet .. 650 milliGRAM(s) Oral every 6 hours PRN Temp greater or equal to 38C (100.4F), Mild Pain (1 - 3)  albuterol/ipratropium for Nebulization 3 milliLiter(s) Nebulizer every 6 hours PRN Shortness of Breath and/or Wheezing  dextrose Oral Gel 15 Gram(s) Oral once PRN Blood Glucose LESS THAN 70 milliGRAM(s)/deciliter  melatonin 3 milliGRAM(s) Oral at bedtime PRN Insomnia      Diet, Pureed:   Consistent Carbohydrate Evening Snack  Low Sodium  Lacto-Ovo Veg (Accepts Milk Prod., Eggs)  Supplement Feeding Modality:  Oral  Glucerna Shake Cans or Servings Per Day:  1       Frequency:  Three Times a day (03-13-23 @ 12:08) [Active]          Vital Signs Last 24 Hrs  T(C): 36.9 (13 Mar 2023 16:08), Max: 37.1 (13 Mar 2023 10:54)  T(F): 98.4 (13 Mar 2023 16:08), Max: 98.8 (13 Mar 2023 10:54)  HR: 75 (13 Mar 2023 16:08) (75 - 91)  BP: 120/71 (13 Mar 2023 16:08) (120/71 - 156/68)  BP(mean): --  RR: 18 (13 Mar 2023 16:08) (18 - 18)  SpO2: 96% (13 Mar 2023 16:08) (96% - 97%)    Parameters below as of 13 Mar 2023 16:08  Patient On (Oxygen Delivery Method): room air          03-12-23 @ 07:01  -  03-13-23 @ 07:00  --------------------------------------------------------  IN: 500 mL / OUT: 0 mL / NET: 500 mL              LABS:                    WBC:  WBC Count: 7.90 K/uL (03-10 @ 08:40)      MICROBIOLOGY:  RECENT CULTURES:                  Sodium:  Sodium, Serum: 146 mmol/L (03-10 @ 08:40)      0.80 mg/dL 03-10 @ 08:40      Hemoglobin:  Hemoglobin: 10.8 g/dL (03-10 @ 08:40)      Platelets: Platelet Count - Automated: 242 K/uL (03-10 @ 08:40)              RADIOLOGY & ADDITIONAL STUDIES:      MICROBIOLOGY:  RECENT CULTURES:

## 2023-03-13 NOTE — DISCHARGE NOTE PROVIDER - DETAILS OF MALNUTRITION DIAGNOSIS/DIAGNOSES
This patient has been assessed with a concern for Malnutrition and was treated during this hospitalization for the following Nutrition diagnosis/diagnoses:     -  12/23/2022: Moderate protein-calorie malnutrition

## 2023-03-13 NOTE — DISCHARGE NOTE PROVIDER - NSDCMRMEDTOKEN_GEN_ALL_CORE_FT
albuterol 90 mcg/inh inhalation aerosol: 2 puff(s) inhaled every 6 hours, As Needed -for shortness of breath and/or wheezing   aspirin 81 mg oral delayed release tablet: 1 tab(s) orally once a day  furosemide 40 mg oral tablet: 1 tab(s) orally once a day  Insulin Pen Needles, 4mm: 1 application subcutaneously 4 times a day . ** Use with insulin pen **   Insulin Pen Needles, 4mm: 1 application subcutaneously 4 times a day . ** Use with insulin pen **   isosorbide mononitrate 120 mg oral tablet, extended release: 1 tab(s) orally once a day   Levemir 100 units/mL subcutaneous solution: 20 unit(s) subcutaneous once a day (at bedtime)  Lipitor 80 mg oral tablet: 1 tab(s) orally once a day  Liquifilm Tears preserved ophthalmic solution: 1 drop(s) to each affected eye 2 times a day  metFORMIN 1000 mg oral tablet: 1 tab(s) orally 2 times a day  MiraLax oral powder for reconstitution: 17 gram(s) orally once a day   ondansetron 4 mg oral tablet, disintegratin tab(s) orally every 6 hours   Plavix 75 mg oral tablet: 1 tab(s) orally once a day   ranolazine 500 mg oral tablet, extended release: 1 tab(s) orally 2 times a day  senna oral tablet: 2 tab(s) orally once a day (at bedtime)  Symbicort 160 mcg-4.5 mcg/inh inhalation aerosol: 2 puff(s) inhaled 2 times a day  tamsulosin 0.4 mg oral capsule: 1 cap(s) orally once a day   albuterol 90 mcg/inh inhalation aerosol: 2 puff(s) inhaled every 6 hours, As Needed -for shortness of breath and/or wheezing   apixaban 5 mg oral tablet: 1 tab(s) orally every 12 hours  aspirin 81 mg oral delayed release tablet: 1 tab(s) orally once a day  cyanocobalamin 1000 mcg oral tablet: 1 tab(s) orally once a day  Entresto 24 mg-26 mg oral tablet: 1 tab(s) orally 2 times a day  insulin glargine 100 units/mL subcutaneous solution: 10 unit(s) subcutaneous once a day (at bedtime)  levETIRAcetam 100 mg/mL oral solution: 15 milliliter(s) orally 2 times a day  Lipitor 80 mg oral tablet: 1 tab(s) orally once a day  Liquifilm Tears preserved ophthalmic solution: 1 drop(s) to each affected eye 2 times a day  metoprolol tartrate 25 mg oral tablet: 1 tab(s) orally 2 times a day  MiraLax oral powder for reconstitution: 17 gram(s) orally once a day   Multiple Vitamins with Minerals oral liquid: 1  orally once a day  senna oral tablet: 2 tab(s) orally once a day (at bedtime)  Symbicort 160 mcg-4.5 mcg/inh inhalation aerosol: 2 puff(s) inhaled 2 times a day  tamsulosin 0.4 mg oral capsule: 1 cap(s) orally once a day  valproic acid 250 mg/5 mL oral liquid: 750 mg orally or PEG in AM  1000mg orally or PEG in the evening

## 2023-03-13 NOTE — DISCHARGE NOTE PROVIDER - NSDCFUADDINST_GEN_ALL_CORE_FT
1 Glucerna Shake can three times/day    CT chest on admission in November showed a small left lower lobe nodules, measuring up to 1 cm - please get another CT chest in 2 months (6 months from November) to monitor for nodule stability

## 2023-03-13 NOTE — DISCHARGE NOTE PROVIDER - PROVIDER TOKENS
FREE:[LAST:[Primary Care Doctor],PHONE:[(   )    -],FAX:[(   )    -]],FREE:[LAST:[Neurologist],PHONE:[(   )    -],FAX:[(   )    -]] FREE:[LAST:[Primary Care Doctor],PHONE:[(   )    -],FAX:[(   )    -]],FREE:[LAST:[Neurologist],PHONE:[(   )    -],FAX:[(   )    -]],FREE:[LAST:[Your cardiologist],PHONE:[(   )    -],FAX:[(   )    -]]

## 2023-03-13 NOTE — DISCHARGE NOTE PROVIDER - HOSPITAL COURSE
73 yo male w/ PMH of COPD, CAD sp PCI w/ stent, CABG 2014, HF w/ PeF, 2nd degree heart block, sp PPM, HTN, DM, CKD Stage 3, CVA- lacunar infarcts admitted to ICU originally for cardiac arrest. ACLS for PEA arrest and ROSC returned after 5 minutes. Cardiac arrest possibly secondary to severe hypoglycemia from eating less and not tracking blood sugar carefully. Hospital course complicated by 1) prolonged hypoxemic respiratory failure. Difficult extubation requiring s/p trach and peg 2) left upper extremity/left subclavian DVT and placed on Eliquis 3) multiple infections (Enterococcal faecalis cellulitis, infected left hand hematoma, tracheobronchitis secondary to citrobacter 4) tonic clonic seizure - on Keppra / Depakote.     # Acute/Chronic Respiratory failure w/ Hypoxia and Hypercapnia/ COPD, now trach removed.   - S/p intubation; failed extubation requiring s/p trach and peg. Trach now removed.    - cont w/ Symbicort, Duonebs prn  - Frequent suction, PEG feeds now on hold, was advanced to puree diet. Tolerates well    # Myoclonic Seizure   - EEG: Myoclonic seizure, Neurology on board, cont w/ Keppra and Depakote.    # S/p Cardiac Arrest, acute metabolic encephalopathy due to severe hypoglycemia.  - s/p ACLS w/ ROSC after 5 min   - 2nd degree heart block, s/p PPM in place.  - ECHO Takotsubo cardiomyopathy. EF 50-55%, LVH, mild pHTN    - s/p ICU course; Hemodynamically stable now.   - cont w/ Metoprolol, and atorvastatin.               # Occlusive Left subclavian thrombosis   - LUE venous duplex : occlusive thrombus in the left mid subclavian vein with partial slow flow detected in the lateral subclavian vein.  - LUE arterial Duplex neg, cont w/ Eliquis     # DM2 : A1c 8.8%, resume Lantus 10 units per day, glucose improved to 132. Cont w/ FS monitoring w/ insulin s/s coverage     # HTN, Cad s/p PCI w/ stent/ CABG and HPL.  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 Tube feeding on hold, puree diet. Added free water flush 250 ml tid for mild hypernatremia at 146.  Check CBC, SMA-7 weekly on Wednesday. 71 yo male w/ history of COPD, CAD s/p PCI w/ stent, CABG 2014, HF w/ PeF, 2nd degree heart block, s/p PPM, HTN, DM, CKD Stage 3, CVA- lacunar infarcts was brought in for unresponsiveness and had a cardiac arrest in the ED on 11/14. ACLS for PEA arrest and ROSC returned after 5 minutes. Cardiac arrest possibly secondary to severe hypoglycemia from eating less and not tracking blood sugar carefully. He was intubated and admitted to the ICU. ECHO showed Takotsubo cardiomyopathy. EF 50-55%, LVH, mild pHTN. Pt had tonic clonic seizures shortly post arrest, likely due to hypoglycemia vs anoxic encephalopathy. Hospital course was complicated by prolonged hypoxemic respiratory failure. Difficult extubation requiring s/p trach and PEG on 12/5, ATN & shock liver on admission, left upper extremity/left subclavian DVT on 12/11 placed on Eliquis, multiple infections (Enterococcal faecalis cellulitis on 12/12, infected left hand hematoma status post I&D by plastics on 12/13, tracheobronchitis secondary to citrobacter, stage 3-4 sacral decubiti wound s/p debridement 12/19 and fever of 102 on 1/19. Pt was weaned off tracheostomy and it was removed 3/7. His PEG feeds are now on hold and patient is on puree diet. I have spoken w/ the son and informed him that if patient continues to eat well, the PEG can be removed. The patient could not tolerate CPAP for AUDI and it was stopped on 3/7/2023. Of note, his CT chest on admission in November showed a small left lower lobe nodules, measuring up to 1 cm and I informed his son that the pt will need a CT in 2 months (6 months from November) to monitor for stability.    Discharge time: 43 minutes     Vital Signs Last 24 Hrs  T(C): 37.1 (18 Mar 2023 10:44), Max: 37.1 (18 Mar 2023 04:57)  T(F): 98.7 (18 Mar 2023 10:44), Max: 98.8 (18 Mar 2023 04:57)  HR: 96 (18 Mar 2023 10:44) (73 - 96)  BP: 125/99 (18 Mar 2023 10:44) (110/66 - 141/98)   RR: 17 (18 Mar 2023 10:44) (17 - 18)  SpO2: 96% (18 Mar 2023 10:44) (96% - 98%)    Parameters below as of 18 Mar 2023 10:44  Patient On (Oxygen Delivery Method): room air    PHYSICAL EXAM:  GENERAL: NAD, well-groomed, well-developed  HEAD:  Atraumatic, Normocephalic  EYES: EOMI, PERRLA   NECK: Supple   NERVOUS SYSTEM: Alert    CHEST/LUNG: Clear to auscultation bilaterally; No rales, rhonchi, wheezing, or rubs  HEART: Regular rate and rhythm; No murmurs, rubs, or gallops  ABDOMEN: Soft, Nontender, Nondistended; Bowel sounds present, PEG in place  EXTREMITIES: No clubbing, cyanosis, or edema

## 2023-03-13 NOTE — DISCHARGE NOTE PROVIDER - NSDCFUADDAPPT_GEN_ALL_CORE_FT
It is important to see your primary physician as well as the physicians noted below within the next week to perform a comprehensive medical review.  Call their offices for an appointment as soon as you leave the hospital.  You will also need to see them for renewal of your medications.  If you do not have a primary physician, contact the [facility] Physician Referral Service at [phone].  To obtain your results, you can access the [facility] Patient Portal at [email or web address].  Your medical issues appear to be stable at this time, but if your symptoms recur or worsen, contact your physicians and/or return to the hospital if necessary.  If you encounter any issues or questions with your medication, call your physicians before stopping the medication.

## 2023-03-13 NOTE — PROGRESS NOTE ADULT - ASSESSMENT
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 ACLS for PEA arrest and ROSC returned after 5 minutes. Cardiac arrest possibly secondary to severe hypoglycemia from eating less and not tracking blood sugar carefully. Hospital course complicated by 1) prolonged hypoxemic respiratory failure. Difficult extubation requiring s/p trach and peg 2) left upper extremity/left subclavian DVT and placed on Eliquis 3) multiple infections (Enterococcal faecalis cellulitis, infected left hand hematoma, tracheobronchitis secondary to citrobacter 4) tonic clonic seizure - on Keppra / Depakote - seen by NEUROLOGY 5) fever of 102 on 1/19.     # Acute/Chronic Respiratory failure w/ Hypoxia and Hypercapnia/ COPD, now trach-vent dependent.  - S/p intubation; failed extubation requiring s/p trach and peg. Trach now removed.    - cont w/ Symbicort, Duonebs prn  - Frequent suction, PEG feeds now on hold, was advanced to puree diet.    # Myoclonic Seizure   - EEG: Myoclonic seizure, Neurology on board, cont w/ Keppra and Depakote.    # S/p Cardiac Arrest, acute metabolic encephalopathy due to severe hypoglycemia.  - s/p ACLS w/ ROSC after 5 min   - 2nd degree heart block, s/p PPM in place.  - ECHO Takotsubo cardiomyopathy. EF 50-55%, LVH, mild pHTN    - s/p ICU course; Hemodynamically stable now  - cont w/ Metoprolol, and atorvastatin. Decreased Lopressor dose today due to low BP.  BP, HR better today.              # Occlusive Left subclavian thrombosis   - LUE venous duplex : occlusive thrombus in the left mid subclavian vein with partial slow flow detected in the lateral subclavian vein.  - LUE arterial Duplex neg, cont w/ Eliquis     # DM2   - A1c 8.8%, resume Lantus 10 units per day, glucose improved to 132. Cont w/ FS monitoring w/ insulin s/s coverage     # HTN, Cad s/p PCI w/ stent/ CABG and HPL.  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 Tube feeding on hold, puree diet. Added free water flush 250 ml tid for mild hypernatremia at 146.  Check CBC, SMA-7 weekly.     Disposition: Patient has no insurance and Family in process of getting guardianship, court date 3/12.     Stable for discharge to rehab when bed available.    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 ACLS for PEA arrest and ROSC returned after 5 minutes. Cardiac arrest possibly secondary to severe hypoglycemia from eating less and not tracking blood sugar carefully. Hospital course complicated by 1) prolonged hypoxemic respiratory failure. Difficult extubation requiring s/p trach and peg 2) left upper extremity/left subclavian DVT and placed on Eliquis 3) multiple infections (Enterococcal faecalis cellulitis, infected left hand hematoma, tracheobronchitis secondary to citrobacter 4) tonic clonic seizure - on Keppra / Depakote - seen by NEUROLOGY 5) fever of 102 on 1/19.     # Acute/Chronic Respiratory failure w/ Hypoxia and Hypercapnia/ COPD, now trach removed.   - S/p intubation; failed extubation requiring s/p trach and peg. Trach now removed.    - cont w/ Symbicort, Duonebs prn  - Frequent suction, PEG feeds now on hold, was advanced to puree diet. Tolerates well    # Myoclonic Seizure   - EEG: Myoclonic seizure, Neurology on board, cont w/ Keppra and Depakote.    # S/p Cardiac Arrest, acute metabolic encephalopathy due to severe hypoglycemia.  - s/p ACLS w/ ROSC after 5 min   - 2nd degree heart block, s/p PPM in place.  - ECHO Takotsubo cardiomyopathy. EF 50-55%, LVH, mild pHTN    - s/p ICU course; Hemodynamically stable now.   - cont w/ Metoprolol, and atorvastatin.               # Occlusive Left subclavian thrombosis   - LUE venous duplex : occlusive thrombus in the left mid subclavian vein with partial slow flow detected in the lateral subclavian vein.  - LUE arterial Duplex neg, cont w/ Eliquis     # DM2 : A1c 8.8%, resume Lantus 10 units per day, glucose improved to 132. Cont w/ FS monitoring w/ insulin s/s coverage     # HTN, Cad s/p PCI w/ stent/ CABG and HPL.  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 Tube feeding on hold, puree diet. Added free water flush 250 ml tid for mild hypernatremia at 146.  Check CBC, SMA-7 weekly on Wednesday.      Stable for discharge to rehab when bed available.

## 2023-03-13 NOTE — PROGRESS NOTE ADULT - ASSESSMENT
REVIEW OF SYMPTOMS      Able to give (reliable) ROS  NO     PHYSICAL EXAM    HEENT Unremarkable  atraumatic   RESP Fair air entry EXP prolonged    Harsh breath sound Resp distres mild   CARDIAC S1 S2 No S3     NO JVD    ABDOMEN SOFT BS PRESENT NOT DISTENDED No hepatosplenomegaly   PEDAL EDEMA present No calf tenderness  NO rash       GENERAL DATA .   GO.  12/11/2022 full code       ALLGY.  nka                            WT. ..  12/2/2022 86  BMI. ..    12/2/2022 29                          ICU STAY.  .. 11/29-12/9  COVID.   .. 12/2/2022 scv2 (-)   .. 11/20/2022 scv2 (-)   BEST PRACTICE ISSUES.    HOB ELEVATN. Yes  DVT PPLX.    .. 1/3/2023 apixa 5.2 (vte)    (DVT 12/12 l Subclav throm)  ALEJO PPLX.   INFN PPLX.   .. 11/14 chlorhex 2%   SP SW ANTWAN.         DIET.    ..  12/15 glucerna 1.2 1440 gt   IV fl.    PROCEDURES.  .. 3/7/2023 trach decannulatd     PATIENT PRESENTATION.  72 m doa 11/14/2022 cac    PMH  PMH CAD s/p PCI with stent 2015 and CABG 2014,   PMH  s/p medtronic PPM,   PMH CVA (lacunar infarct)    HOSPITAL COURSE   .. 1) PEA arrest with ROSC after approximately 5 min 11/14  Now communicative   .. 2) DVT 12/12 l Subclav thromS 12/15/2022 lvnx 80.2 1/3 changed to apixaban 5.2  .. 3) TRACH 12/5/2022 trach  .. 4) PEG12/5/2022 peg   .. 5) Cellulitis hand absc 12/13 mod enterococcus fecalis  staph epi 12/12-12/15/2022  cephalexin 12/15 vanco once  12/16-12/19 zosyn  .. 6) Vent weaning   .. 7) trach decannulatn 3.7        PROBLEM ASSESSMENT RECOMMENDATIONS.  Trach decannultion.  .. 3/7/2023 2p Pt had tolerated cap for 48 h and had minimal secretions RBAA explained to son and pt and pt decannulated after both approved plan  3/7/2023 5:20 PM 2l 743/54/110  .. 3/8/2023 doing well post decannulatn  .. 3/10/2023 ra po 94%  .. 3/11/2023 doing well post decannulation   SLEEP APNEA.  .. 3/5/2023 cpap ordered  .. 3/6/2023 pt unable to tolerate cpap   .. 3/7/2023 cpap dced   VTE  .. On apixaba  CAD.  .. 11/14 asa 81   .. 12/13 metoprolol 25.2   CHF   .. Cr 2/6/2023 Cr .7   .. 11/14/2022 echo mod decr segmental lvsf apical lateral apiccal ant segment are abn takotsubo cmpthy pasp 42 .  .. Monitor for chf has chr hfref per echo   COPD   .. 2/1 duoneb p   .. 12/30 symbicort 160   .. 1/7/2023 glycopyrrolate 1.2   .. 1/9/2023 ipratropium  .. 1/15/2023 scopolamine   .. continue bd ics for copd   Anemia.  .. Hb 1/12-1/14-1/19-1/20-7/21-1/25-2/3-2/8-2/10-2/15-2/27/2023       Hb 9.8- 10 -9.1- 9.8  - 9.2 - 9.5 -10- 10 - 10.5 -10.8- 11   .. target hb 7 (+)  .. monitor   sp cac   .. good neuro recovery awake alert interactive   VTE  .. 1/3/2023 apixa 5.2 (vte)      TIME SPENT   Over 25 minutes aggregate care time spent on encounter; activities included   direct patient care, counseling and/or coordinating care reviewing notes, lab data/ imaging , discussion with multidisciplinary team/ patient  /family and explaining in detail risks, benefits, alternatives  of the recommendations     BRANDON CAMARILLO

## 2023-03-13 NOTE — PROGRESS NOTE ADULT - SUBJECTIVE AND OBJECTIVE BOX
INTERVAL HPI/OVERNIGHT EVENTS:  Pt seen and examined at bedside.     Allergies/Intolerance: No Known Allergies      MEDICATIONS  (STANDING):  apixaban 5 milliGRAM(s) Oral every 12 hours  aspirin  chewable 81 milliGRAM(s) Oral daily  atorvastatin 20 milliGRAM(s) Oral at bedtime  bacitracin   Ointment 1 Application(s) Topical two times a day  budesonide 160 MICROgram(s)/formoterol 4.5 MICROgram(s) Inhaler 2 Puff(s) Inhalation two times a day  chlorhexidine 2% Cloths 1 Application(s) Topical <User Schedule>  cyanocobalamin 1000 MICROGram(s) Oral daily  dextrose 5%. 1000 milliLiter(s) (100 mL/Hr) IV Continuous <Continuous>  dextrose 5%. 1000 milliLiter(s) (50 mL/Hr) IV Continuous <Continuous>  dextrose 50% Injectable 25 Gram(s) IV Push once  dextrose 50% Injectable 12.5 Gram(s) IV Push once  dextrose 50% Injectable 25 Gram(s) IV Push once  glucagon  Injectable 1 milliGRAM(s) IntraMuscular once  insulin glargine Injectable (LANTUS) 10 Unit(s) SubCutaneous at bedtime  insulin lispro (ADMELOG) corrective regimen sliding scale   SubCutaneous three times a day before meals  insulin lispro (ADMELOG) corrective regimen sliding scale   SubCutaneous at bedtime  levETIRAcetam  Solution 1500 milliGRAM(s) Oral two times a day  metoprolol tartrate 12.5 milliGRAM(s) Oral two times a day  multivitamin/minerals/iron Oral Solution (CENTRUM) 15 milliLiter(s) Enteral Tube daily  pantoprazole   Suspension 40 milliGRAM(s) Oral before breakfast  polyethylene glycol 3350 17 Gram(s) Oral daily  senna 2 Tablet(s) Oral at bedtime  valproic  acid Syrup 1000 milliGRAM(s) Oral <User Schedule>  valproic  acid Syrup 750 milliGRAM(s) Oral <User Schedule>  vitamin A &amp; D Ointment 1 Application(s) Topical two times a day    MEDICATIONS  (PRN):  acetaminophen     Tablet .. 650 milliGRAM(s) Oral every 6 hours PRN Temp greater or equal to 38C (100.4F), Mild Pain (1 - 3)  albuterol/ipratropium for Nebulization 3 milliLiter(s) Nebulizer every 6 hours PRN Shortness of Breath and/or Wheezing  dextrose Oral Gel 15 Gram(s) Oral once PRN Blood Glucose LESS THAN 70 milliGRAM(s)/deciliter  melatonin 3 milliGRAM(s) Oral at bedtime PRN Insomnia        ROS: all systems reviewed and wnl      PHYSICAL EXAMINATION:  Vital Signs Last 24 Hrs  T(C): 36.8 (13 Mar 2023 04:06), Max: 37 (12 Mar 2023 11:21)  T(F): 98.2 (13 Mar 2023 04:06), Max: 98.6 (12 Mar 2023 11:21)  HR: 90 (13 Mar 2023 04:06) (60 - 90)  BP: 149/90 (13 Mar 2023 04:06) (115/69 - 149/90)  BP(mean): --  RR: 18 (13 Mar 2023 04:06) (18 - 18)  SpO2: 97% (13 Mar 2023 04:06) (96% - 98%)    Parameters below as of 13 Mar 2023 04:06  Patient On (Oxygen Delivery Method): room air      CAPILLARY BLOOD GLUCOSE      POCT Blood Glucose.: 142 mg/dL (12 Mar 2023 21:12)  POCT Blood Glucose.: 190 mg/dL (12 Mar 2023 17:10)  POCT Blood Glucose.: 132 mg/dL (12 Mar 2023 11:26)      03-12 @ 07:01  -  03-13 @ 07:00  --------------------------------------------------------  IN: 500 mL / OUT: 0 mL / NET: 500 mL        GENERAL: stable, in chair, trach removed, PEG tube in place, not in use, no fevers, alert  NECK: supple, No JVD  CHEST/LUNG: clear to auscultation bilaterally; no rales, rhonchi, or wheezing b/l  HEART: normal S1, S2  ABDOMEN: BS+, soft, ND, NT   EXTREMITIES:  pulses palpable; no clubbing, cyanosis, or edema b/l LEs      LABS:

## 2023-03-14 LAB
FLUAV AG NPH QL: SIGNIFICANT CHANGE UP
FLUBV AG NPH QL: SIGNIFICANT CHANGE UP
GLUCOSE BLDC GLUCOMTR-MCNC: 102 MG/DL — HIGH (ref 70–99)
GLUCOSE BLDC GLUCOMTR-MCNC: 122 MG/DL — HIGH (ref 70–99)
GLUCOSE BLDC GLUCOMTR-MCNC: 178 MG/DL — HIGH (ref 70–99)
GLUCOSE BLDC GLUCOMTR-MCNC: 207 MG/DL — HIGH (ref 70–99)
SARS-COV-2 RNA SPEC QL NAA+PROBE: SIGNIFICANT CHANGE UP

## 2023-03-14 PROCEDURE — 99232 SBSQ HOSP IP/OBS MODERATE 35: CPT

## 2023-03-14 RX ADMIN — BUDESONIDE AND FORMOTEROL FUMARATE DIHYDRATE 2 PUFF(S): 160; 4.5 AEROSOL RESPIRATORY (INHALATION) at 17:43

## 2023-03-14 RX ADMIN — APIXABAN 5 MILLIGRAM(S): 2.5 TABLET, FILM COATED ORAL at 17:43

## 2023-03-14 RX ADMIN — PANTOPRAZOLE SODIUM 40 MILLIGRAM(S): 20 TABLET, DELAYED RELEASE ORAL at 06:40

## 2023-03-14 RX ADMIN — CHLORHEXIDINE GLUCONATE 1 APPLICATION(S): 213 SOLUTION TOPICAL at 06:00

## 2023-03-14 RX ADMIN — ATORVASTATIN CALCIUM 20 MILLIGRAM(S): 80 TABLET, FILM COATED ORAL at 21:09

## 2023-03-14 RX ADMIN — PREGABALIN 1000 MICROGRAM(S): 225 CAPSULE ORAL at 11:14

## 2023-03-14 RX ADMIN — Medication 4: at 11:53

## 2023-03-14 RX ADMIN — Medication 2: at 16:54

## 2023-03-14 RX ADMIN — SCOPALAMINE 1 PATCH: 1 PATCH, EXTENDED RELEASE TRANSDERMAL at 00:20

## 2023-03-14 RX ADMIN — BUDESONIDE AND FORMOTEROL FUMARATE DIHYDRATE 2 PUFF(S): 160; 4.5 AEROSOL RESPIRATORY (INHALATION) at 05:51

## 2023-03-14 RX ADMIN — Medication 1000 MILLIGRAM(S): at 21:09

## 2023-03-14 RX ADMIN — Medication 1 APPLICATION(S): at 17:42

## 2023-03-14 RX ADMIN — Medication 1 APPLICATION(S): at 06:00

## 2023-03-14 RX ADMIN — LEVETIRACETAM 1500 MILLIGRAM(S): 250 TABLET, FILM COATED ORAL at 17:43

## 2023-03-14 RX ADMIN — Medication 25 MILLIGRAM(S): at 05:49

## 2023-03-14 RX ADMIN — Medication 1 APPLICATION(S): at 17:41

## 2023-03-14 RX ADMIN — Medication 81 MILLIGRAM(S): at 11:14

## 2023-03-14 RX ADMIN — Medication 750 MILLIGRAM(S): at 09:01

## 2023-03-14 RX ADMIN — INSULIN GLARGINE 10 UNIT(S): 100 INJECTION, SOLUTION SUBCUTANEOUS at 21:10

## 2023-03-14 RX ADMIN — Medication 15 MILLILITER(S): at 11:14

## 2023-03-14 RX ADMIN — LEVETIRACETAM 1500 MILLIGRAM(S): 250 TABLET, FILM COATED ORAL at 05:49

## 2023-03-14 RX ADMIN — APIXABAN 5 MILLIGRAM(S): 2.5 TABLET, FILM COATED ORAL at 05:49

## 2023-03-14 RX ADMIN — Medication 1 APPLICATION(S): at 05:49

## 2023-03-14 NOTE — PROGRESS NOTE ADULT - SUBJECTIVE AND OBJECTIVE BOX
INTERVAL HPI/OVERNIGHT EVENTS:  Pt seen and examined at bedside.     Allergies/Intolerance: No Known Allergies      MEDICATIONS  (STANDING):  apixaban 5 milliGRAM(s) Oral every 12 hours  aspirin  chewable 81 milliGRAM(s) Oral daily  atorvastatin 20 milliGRAM(s) Oral at bedtime  bacitracin   Ointment 1 Application(s) Topical two times a day  budesonide 160 MICROgram(s)/formoterol 4.5 MICROgram(s) Inhaler 2 Puff(s) Inhalation two times a day  chlorhexidine 2% Cloths 1 Application(s) Topical <User Schedule>  cyanocobalamin 1000 MICROGram(s) Oral daily  dextrose 5%. 1000 milliLiter(s) (50 mL/Hr) IV Continuous <Continuous>  dextrose 5%. 1000 milliLiter(s) (100 mL/Hr) IV Continuous <Continuous>  dextrose 50% Injectable 25 Gram(s) IV Push once  dextrose 50% Injectable 12.5 Gram(s) IV Push once  dextrose 50% Injectable 25 Gram(s) IV Push once  glucagon  Injectable 1 milliGRAM(s) IntraMuscular once  insulin glargine Injectable (LANTUS) 10 Unit(s) SubCutaneous at bedtime  insulin lispro (ADMELOG) corrective regimen sliding scale   SubCutaneous three times a day before meals  insulin lispro (ADMELOG) corrective regimen sliding scale   SubCutaneous at bedtime  levETIRAcetam  Solution 1500 milliGRAM(s) Oral two times a day  metoprolol tartrate 25 milliGRAM(s) Oral two times a day  multivitamin/minerals/iron Oral Solution (CENTRUM) 15 milliLiter(s) Enteral Tube daily  pantoprazole   Suspension 40 milliGRAM(s) Oral before breakfast  polyethylene glycol 3350 17 Gram(s) Oral daily  senna 2 Tablet(s) Oral at bedtime  valproic  acid Syrup 1000 milliGRAM(s) Oral <User Schedule>  valproic  acid Syrup 750 milliGRAM(s) Oral <User Schedule>  vitamin A &amp; D Ointment 1 Application(s) Topical two times a day    MEDICATIONS  (PRN):  acetaminophen     Tablet .. 650 milliGRAM(s) Oral every 6 hours PRN Temp greater or equal to 38C (100.4F), Mild Pain (1 - 3)  albuterol/ipratropium for Nebulization 3 milliLiter(s) Nebulizer every 6 hours PRN Shortness of Breath and/or Wheezing  dextrose Oral Gel 15 Gram(s) Oral once PRN Blood Glucose LESS THAN 70 milliGRAM(s)/deciliter  melatonin 3 milliGRAM(s) Oral at bedtime PRN Insomnia        ROS: all systems reviewed and wnl      PHYSICAL EXAMINATION:  Vital Signs Last 24 Hrs  T(C): 36.8 (13 Mar 2023 04:06), Max: 37 (12 Mar 2023 11:21)  T(F): 98.2 (13 Mar 2023 04:06), Max: 98.6 (12 Mar 2023 11:21)  HR: 90 (13 Mar 2023 04:06) (60 - 90)  BP: 149/90 (13 Mar 2023 04:06) (115/69 - 149/90)  BP(mean): --  RR: 18 (13 Mar 2023 04:06) (18 - 18)  SpO2: 97% (13 Mar 2023 04:06) (96% - 98%)    Parameters below as of 13 Mar 2023 04:06  Patient On (Oxygen Delivery Method): room air      CAPILLARY BLOOD GLUCOSE      POCT Blood Glucose.: 142 mg/dL (12 Mar 2023 21:12)  POCT Blood Glucose.: 190 mg/dL (12 Mar 2023 17:10)  POCT Blood Glucose.: 132 mg/dL (12 Mar 2023 11:26)      03-12 @ 07:01  -  03-13 @ 07:00  --------------------------------------------------------  IN: 500 mL / OUT: 0 mL / NET: 500 mL        GENERAL: stable, in chair, trach removed, PEG tube in place, not in use, no fevers, alert  NECK: supple, No JVD  CHEST/LUNG: clear to auscultation bilaterally; no rales, rhonchi, or wheezing b/l  HEART: normal S1, S2  ABDOMEN: BS+, soft, ND, NT   EXTREMITIES:  pulses palpable; no clubbing, cyanosis, or edema b/l LEs      LABS:

## 2023-03-14 NOTE — PROGRESS NOTE ADULT - ASSESSMENT
REVIEW OF SYMPTOMS      Able to give (reliable) ROS  NO     PHYSICAL EXAM    HEENT Unremarkable  atraumatic   RESP Fair air entry EXP prolonged    Harsh breath sound Resp distres mild   CARDIAC S1 S2 No S3     NO JVD    ABDOMEN SOFT BS PRESENT NOT DISTENDED No hepatosplenomegaly   PEDAL EDEMA present No calf tenderness  NO rash       GENERAL DATA .   GOC.  12/11/2022 full code       ALLGY.  nka                            WT. ..  12/2/2022 86  BMI. ..    12/2/2022 29                          ICU STAY.  .. 11/29-12/9  COVID.   .. 12/2/2022 scv2 (-)   .. 11/20/2022 scv2 (-)   BEST PRACTICE ISSUES.    HOB ELEVATN. Yes  DVT PPLX.    .. 1/3/2023 apixa 5.2 (vte)    (DVT 12/12 l Subclav throm)  ALEJO PPLX.   INFN PPLX.   .. 11/14 chlorhex 2%   SP SW ANTWAN.         DIET.    ..  12/15 glucerna 1.2 1440 gt   IV fl.    PROCEDURES.  .. 3/7/2023 trach decannulatd     ABGS.  3/7/2023 5:20 PM 2l 743/54/110  1/6/2023 ac 16/450/.3/5 746/49/123     VS/ PO/IO/ VENT/ DRIPS.  3/14/2023 afeb 85 130/60   3/14/2023 ra 99%    PATIENT PRESENTATION.  72 m doa 11/14/2022 cac    PMH  PMH CAD s/p PCI with stent 2015 and CABG 2014,   PMH  s/p medtronic PPM,   PMH CVA (lacunar infarct)    HOSPITAL COURSE   .. 1) PEA arrest with ROSC after approximately 5 min 11/14  Now communicative   .. 2) DVT 12/12 l Subclav thromS 12/15/2022 lvnx 80.2 1/3 changed to apixaban 5.2  .. 3) TRACH 12/5/2022 trach  .. 4) PEG12/5/2022 peg   .. 5) Cellulitis hand absc 12/13 mod enterococcus fecalis  staph epi 12/12-12/15/2022  cephalexin 12/15 vanco once  12/16-12/19 zosyn  .. 6) Vent weaning   .. 7) trach decannulatn 3.7        PROBLEM ASSESSMENT RECOMMENDATIONS.  Trach decannultion.  .. 3/7/2023 2p Pt had tolerated cap for 48 h and had minimal secretions RBAA explained to son and pt and pt decannulated after both approved plan  3/7/2023 5:20 PM 2l 743/54/110  .. 3/8/2023 doing well post decannulatn  .. 3/10/2023 ra po 94%  .. 3/11/2023 doing well post decannulation   SLEEP APNEA.  .. 3/5/2023 cpap ordered  .. 3/6/2023 pt unable to tolerate cpap   .. 3/7/2023 cpap dced   VTE  .. On apixaba  CAD.  .. 11/14 asa 81   .. 12/13 metoprolol 25.2   CHF   .. Cr 2/6/2023 Cr .7   .. 11/14/2022 echo mod decr segmental lvsf apical lateral apiccal ant segment are abn takotsubo cmpthy pasp 42 .  .. Monitor for chf has chr hfref per echo   COPD   .. 2/1 duoneb p   .. 12/30 symbicort 160   .. 1/7/2023 glycopyrrolate 1.2   .. 1/9/2023 ipratropium  .. 1/15/2023 scopolamine   .. continue bd ics for copd   Anemia.  .. Hb 1/12-1/14-1/19-1/20-7/21-1/25-2/3-2/8-2/10-2/15-2/27/2023       Hb 9.8- 10 -9.1- 9.8  - 9.2 - 9.5 -10- 10 - 10.5 -10.8- 11   .. target hb 7 (+)  .. monitor   sp cac   .. good neuro recovery awake alert interactive   VTE  .. 1/3/2023 apixa 5.2 (vte)      TIME SPENT   Over 25 minutes aggregate care time spent on encounter; activities included   direct patient care, counseling and/or coordinating care reviewing notes, lab data/ imaging , discussion with multidisciplinary team/ patient  /family and explaining in detail risks, benefits, alternatives  of the recommendations     BRANDON CAMARILLO

## 2023-03-14 NOTE — PROGRESS NOTE ADULT - ASSESSMENT
73 yo male w/ PMH of COPD, CAD sp PCI w/ stent, CABG 2014, HF w/ PeF, 2nd degree heart block, sp PPM, HTN, DM, CKD Stage 3, CVA- lacunar infarcts admitted to ICU originally for cardiac arrest. ACLS for PEA arrest and ROSC returned after 5 minutes. Cardiac arrest possibly secondary to severe hypoglycemia from eating less and not tracking blood sugar carefully. Hospital course complicated by 1) prolonged hypoxemic respiratory failure. Difficult extubation requiring s/p trach and peg 2) left upper extremity/left subclavian DVT and placed on Eliquis 3) multiple infections (Enterococcal faecalis cellulitis, infected left hand hematoma, tracheobronchitis secondary to citrobacter 4) tonic clonic seizure - on Keppra / Depakote - seen by NEUROLOGY 5) fever of 102 on 1/19.     # Acute/Chronic Respiratory failure w/ Hypoxia and Hypercapnia/ COPD, now trach removed.   - S/p intubation; failed extubation requiring s/p trach and peg. Trach now removed.    - cont w/ Symbicort, Duonebs prn  - Frequent suction, PEG feeds now on hold, was advanced to puree diet. Tolerates well    # Myoclonic Seizure   - EEG: Myoclonic seizure, Neurology on board, cont w/ Keppra and Depakote.    # S/p Cardiac Arrest, acute metabolic encephalopathy due to severe hypoglycemia.  - s/p ACLS w/ ROSC after 5 min   - 2nd degree heart block, s/p PPM in place.  - ECHO Takotsubo cardiomyopathy. EF 50-55%, LVH, mild pHTN    - s/p ICU course; Hemodynamically stable now.   - cont w/ Metoprolol, and atorvastatin.               # Occlusive Left subclavian thrombosis   - LUE venous duplex : occlusive thrombus in the left mid subclavian vein with partial slow flow detected in the lateral subclavian vein.  - LUE arterial Duplex neg, cont w/ Eliquis     # DM2 : A1c 8.8%, resume Lantus 10 units per day, glucose improved to 132. Cont w/ FS monitoring w/ insulin s/s coverage     # HTN, Cad s/p PCI w/ stent/ CABG and HPL.  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 Tube feeding on hold, puree diet. Added free water flush 250 ml tid for mild hypernatremia at 146.  Check CBC, SMA-7 weekly on Wednesday.      Stable for discharge to rehab when bed available.

## 2023-03-14 NOTE — PROGRESS NOTE ADULT - SUBJECTIVE AND OBJECTIVE BOX
BRANDON CAMARILLO    [facility] [address] [address]    Allergies    No Known Allergies    Intolerances        PAST MEDICAL & SURGICAL HISTORY:  HTN (hypertension)      HLD (hyperlipidemia)      Diabetes mellitus      Lacunar infarction      BPH (benign prostatic hyperplasia)      CHF (congestive heart failure)      Chronic obstructive pulmonary disease, unspecified COPD type      S/P CABG (coronary artery bypass graft)  2014          FAMILY HISTORY:      Home Medications:  aspirin 81 mg oral delayed release tablet: 1 tab(s) orally once a day (10 Mar 2023 16:30)  Liquifilm Tears preserved ophthalmic solution: 1 drop(s) to each affected eye 2 times a day (10 Mar 2023 16:30)  tamsulosin 0.4 mg oral capsule: 1 cap(s) orally once a day (10 Mar 2023 16:30)      MEDICATIONS  (STANDING):  apixaban 5 milliGRAM(s) Oral every 12 hours  aspirin  chewable 81 milliGRAM(s) Oral daily  atorvastatin 20 milliGRAM(s) Oral at bedtime  bacitracin   Ointment 1 Application(s) Topical two times a day  budesonide 160 MICROgram(s)/formoterol 4.5 MICROgram(s) Inhaler 2 Puff(s) Inhalation two times a day  chlorhexidine 2% Cloths 1 Application(s) Topical <User Schedule>  cyanocobalamin 1000 MICROGram(s) Oral daily  dextrose 5%. 1000 milliLiter(s) (50 mL/Hr) IV Continuous <Continuous>  dextrose 5%. 1000 milliLiter(s) (100 mL/Hr) IV Continuous <Continuous>  dextrose 50% Injectable 25 Gram(s) IV Push once  dextrose 50% Injectable 12.5 Gram(s) IV Push once  dextrose 50% Injectable 25 Gram(s) IV Push once  glucagon  Injectable 1 milliGRAM(s) IntraMuscular once  insulin glargine Injectable (LANTUS) 10 Unit(s) SubCutaneous at bedtime  insulin lispro (ADMELOG) corrective regimen sliding scale   SubCutaneous three times a day before meals  insulin lispro (ADMELOG) corrective regimen sliding scale   SubCutaneous at bedtime  levETIRAcetam  Solution 1500 milliGRAM(s) Oral two times a day  metoprolol tartrate 25 milliGRAM(s) Oral two times a day  multivitamin/minerals/iron Oral Solution (CENTRUM) 15 milliLiter(s) Enteral Tube daily  pantoprazole   Suspension 40 milliGRAM(s) Oral before breakfast  polyethylene glycol 3350 17 Gram(s) Oral daily  senna 2 Tablet(s) Oral at bedtime  valproic  acid Syrup 1000 milliGRAM(s) Oral <User Schedule>  valproic  acid Syrup 750 milliGRAM(s) Oral <User Schedule>  vitamin A &amp; D Ointment 1 Application(s) Topical two times a day    MEDICATIONS  (PRN):  acetaminophen     Tablet .. 650 milliGRAM(s) Oral every 6 hours PRN Temp greater or equal to 38C (100.4F), Mild Pain (1 - 3)  albuterol/ipratropium for Nebulization 3 milliLiter(s) Nebulizer every 6 hours PRN Shortness of Breath and/or Wheezing  dextrose Oral Gel 15 Gram(s) Oral once PRN Blood Glucose LESS THAN 70 milliGRAM(s)/deciliter  melatonin 3 milliGRAM(s) Oral at bedtime PRN Insomnia      Diet, Pureed:   Consistent Carbohydrate Evening Snack  Low Sodium  Lacto-Ovo Veg (Accepts Milk Prod., Eggs)  Supplement Feeding Modality:  Oral  Glucerna Shake Cans or Servings Per Day:  1       Frequency:  Three Times a day (03-13-23 @ 12:08) [Active]          Vital Signs Last 24 Hrs  T(C): 36.2 (14 Mar 2023 04:50), Max: 37 (14 Mar 2023 00:04)  T(F): 97.2 (14 Mar 2023 04:50), Max: 98.6 (14 Mar 2023 00:04)  HR: 78 (14 Mar 2023 04:50) (64 - 78)  BP: 138/67 (14 Mar 2023 04:50) (120/71 - 138/67)  BP(mean): --  RR: 18 (14 Mar 2023 04:50) (18 - 18)  SpO2: 99% (14 Mar 2023 04:50) (96% - 99%)    Parameters below as of 14 Mar 2023 04:50  Patient On (Oxygen Delivery Method): room air          03-13-23 @ 07:01  -  03-14-23 @ 07:00  --------------------------------------------------------  IN: 0 mL / OUT: 200 mL / NET: -200 mL    03-14-23 @ 07:01  -  03-14-23 @ 15:36  --------------------------------------------------------  IN: 360 mL / OUT: 0 mL / NET: 360 mL              LABS:                    WBC:      MICROBIOLOGY:  RECENT CULTURES:                  Sodium:          Hemoglobin:      Platelets:             RADIOLOGY & ADDITIONAL STUDIES:      MICROBIOLOGY:  RECENT CULTURES:

## 2023-03-15 LAB
GLUCOSE BLDC GLUCOMTR-MCNC: 123 MG/DL — HIGH (ref 70–99)
GLUCOSE BLDC GLUCOMTR-MCNC: 186 MG/DL — HIGH (ref 70–99)
GLUCOSE BLDC GLUCOMTR-MCNC: 191 MG/DL — HIGH (ref 70–99)
GLUCOSE BLDC GLUCOMTR-MCNC: 76 MG/DL — SIGNIFICANT CHANGE UP (ref 70–99)

## 2023-03-15 PROCEDURE — 99232 SBSQ HOSP IP/OBS MODERATE 35: CPT

## 2023-03-15 RX ADMIN — Medication 1 APPLICATION(S): at 18:46

## 2023-03-15 RX ADMIN — APIXABAN 5 MILLIGRAM(S): 2.5 TABLET, FILM COATED ORAL at 17:18

## 2023-03-15 RX ADMIN — LEVETIRACETAM 1500 MILLIGRAM(S): 250 TABLET, FILM COATED ORAL at 17:17

## 2023-03-15 RX ADMIN — CHLORHEXIDINE GLUCONATE 1 APPLICATION(S): 213 SOLUTION TOPICAL at 06:05

## 2023-03-15 RX ADMIN — Medication 650 MILLIGRAM(S): at 12:11

## 2023-03-15 RX ADMIN — Medication 1000 MILLIGRAM(S): at 19:52

## 2023-03-15 RX ADMIN — PREGABALIN 1000 MICROGRAM(S): 225 CAPSULE ORAL at 11:11

## 2023-03-15 RX ADMIN — Medication 1 APPLICATION(S): at 06:05

## 2023-03-15 RX ADMIN — PANTOPRAZOLE SODIUM 40 MILLIGRAM(S): 20 TABLET, DELAYED RELEASE ORAL at 06:02

## 2023-03-15 RX ADMIN — BUDESONIDE AND FORMOTEROL FUMARATE DIHYDRATE 2 PUFF(S): 160; 4.5 AEROSOL RESPIRATORY (INHALATION) at 18:46

## 2023-03-15 RX ADMIN — Medication 25 MILLIGRAM(S): at 06:02

## 2023-03-15 RX ADMIN — INSULIN GLARGINE 10 UNIT(S): 100 INJECTION, SOLUTION SUBCUTANEOUS at 21:40

## 2023-03-15 RX ADMIN — Medication 2: at 17:18

## 2023-03-15 RX ADMIN — Medication 1 APPLICATION(S): at 17:17

## 2023-03-15 RX ADMIN — Medication 650 MILLIGRAM(S): at 11:11

## 2023-03-15 RX ADMIN — Medication 2: at 11:25

## 2023-03-15 RX ADMIN — Medication 81 MILLIGRAM(S): at 11:11

## 2023-03-15 RX ADMIN — LEVETIRACETAM 1500 MILLIGRAM(S): 250 TABLET, FILM COATED ORAL at 06:02

## 2023-03-15 RX ADMIN — Medication 750 MILLIGRAM(S): at 08:06

## 2023-03-15 RX ADMIN — SENNA PLUS 2 TABLET(S): 8.6 TABLET ORAL at 21:40

## 2023-03-15 RX ADMIN — APIXABAN 5 MILLIGRAM(S): 2.5 TABLET, FILM COATED ORAL at 06:02

## 2023-03-15 RX ADMIN — Medication 25 MILLIGRAM(S): at 17:18

## 2023-03-15 RX ADMIN — BUDESONIDE AND FORMOTEROL FUMARATE DIHYDRATE 2 PUFF(S): 160; 4.5 AEROSOL RESPIRATORY (INHALATION) at 06:03

## 2023-03-15 RX ADMIN — ATORVASTATIN CALCIUM 20 MILLIGRAM(S): 80 TABLET, FILM COATED ORAL at 21:40

## 2023-03-15 RX ADMIN — Medication 15 MILLILITER(S): at 11:13

## 2023-03-15 NOTE — PROGRESS NOTE ADULT - SUBJECTIVE AND OBJECTIVE BOX
BRANDON MÉNDEZL    [facility] [address]    Allergies    No Known Allergies    Intolerances        PAST MEDICAL & SURGICAL HISTORY:  HTN (hypertension)      HLD (hyperlipidemia)      Diabetes mellitus      Lacunar infarction      BPH (benign prostatic hyperplasia)      CHF (congestive heart failure)      Chronic obstructive pulmonary disease, unspecified COPD type      S/P CABG (coronary artery bypass graft)  2014          FAMILY HISTORY:      Home Medications:  aspirin 81 mg oral delayed release tablet: 1 tab(s) orally once a day (10 Mar 2023 16:30)  Liquifilm Tears preserved ophthalmic solution: 1 drop(s) to each affected eye 2 times a day (10 Mar 2023 16:30)  tamsulosin 0.4 mg oral capsule: 1 cap(s) orally once a day (14 Mar 2023 16:07)      MEDICATIONS  (STANDING):  apixaban 5 milliGRAM(s) Oral every 12 hours  aspirin  chewable 81 milliGRAM(s) Oral daily  atorvastatin 20 milliGRAM(s) Oral at bedtime  bacitracin   Ointment 1 Application(s) Topical two times a day  budesonide 160 MICROgram(s)/formoterol 4.5 MICROgram(s) Inhaler 2 Puff(s) Inhalation two times a day  chlorhexidine 2% Cloths 1 Application(s) Topical <User Schedule>  cyanocobalamin 1000 MICROGram(s) Oral daily  dextrose 5%. 1000 milliLiter(s) (100 mL/Hr) IV Continuous <Continuous>  dextrose 5%. 1000 milliLiter(s) (50 mL/Hr) IV Continuous <Continuous>  dextrose 50% Injectable 25 Gram(s) IV Push once  dextrose 50% Injectable 12.5 Gram(s) IV Push once  dextrose 50% Injectable 25 Gram(s) IV Push once  glucagon  Injectable 1 milliGRAM(s) IntraMuscular once  insulin glargine Injectable (LANTUS) 10 Unit(s) SubCutaneous at bedtime  insulin lispro (ADMELOG) corrective regimen sliding scale   SubCutaneous at bedtime  insulin lispro (ADMELOG) corrective regimen sliding scale   SubCutaneous three times a day before meals  levETIRAcetam  Solution 1500 milliGRAM(s) Oral two times a day  metoprolol tartrate 25 milliGRAM(s) Oral two times a day  multivitamin/minerals/iron Oral Solution (CENTRUM) 15 milliLiter(s) Enteral Tube daily  pantoprazole   Suspension 40 milliGRAM(s) Oral before breakfast  polyethylene glycol 3350 17 Gram(s) Oral daily  senna 2 Tablet(s) Oral at bedtime  valproic  acid Syrup 1000 milliGRAM(s) Oral <User Schedule>  valproic  acid Syrup 750 milliGRAM(s) Oral <User Schedule>  vitamin A &amp; D Ointment 1 Application(s) Topical two times a day    MEDICATIONS  (PRN):  acetaminophen     Tablet .. 650 milliGRAM(s) Oral every 6 hours PRN Temp greater or equal to 38C (100.4F), Mild Pain (1 - 3)  albuterol/ipratropium for Nebulization 3 milliLiter(s) Nebulizer every 6 hours PRN Shortness of Breath and/or Wheezing  dextrose Oral Gel 15 Gram(s) Oral once PRN Blood Glucose LESS THAN 70 milliGRAM(s)/deciliter  melatonin 3 milliGRAM(s) Oral at bedtime PRN Insomnia      Diet, Pureed:   Consistent Carbohydrate Evening Snack  Low Sodium  Lacto-Ovo Veg (Accepts Milk Prod., Eggs)  Supplement Feeding Modality:  Oral  Glucerna Shake Cans or Servings Per Day:  1       Frequency:  Three Times a day (03-13-23 @ 12:08) [Active]          Vital Signs Last 24 Hrs  T(C): 36.3 (15 Mar 2023 04:41), Max: 37.1 (14 Mar 2023 23:46)  T(F): 97.4 (15 Mar 2023 04:41), Max: 98.7 (14 Mar 2023 23:46)  HR: 90 (15 Mar 2023 04:41) (85 - 105)  BP: 118/75 (15 Mar 2023 04:41) (106/71 - 118/75)  BP(mean): 81 (14 Mar 2023 17:40) (81 - 81)  RR: 18 (15 Mar 2023 04:41) (18 - 18)  SpO2: 97% (15 Mar 2023 04:41) (97% - 98%)    Parameters below as of 15 Mar 2023 04:41  Patient On (Oxygen Delivery Method): room air          03-14-23 @ 07:01  -  03-15-23 @ 07:00  --------------------------------------------------------  IN: 1012 mL / OUT: 0 mL / NET: 1012 mL              LABS:                    WBC:      MICROBIOLOGY:  RECENT CULTURES:                  Sodium:          Hemoglobin:      Platelets:             RADIOLOGY & ADDITIONAL STUDIES:      MICROBIOLOGY:  RECENT CULTURES:

## 2023-03-15 NOTE — PROGRESS NOTE ADULT - ASSESSMENT
73 yo male w/ PMH of COPD, CAD sp PCI w/ stent, CABG 2014, HF w/ PeF, 2nd degree heart block, sp PPM, HTN, DM, CKD Stage 3, CVA- lacunar infarcts admitted to ICU originally for cardiac arrest. ACLS for PEA arrest and ROSC returned after 5 minutes. Cardiac arrest possibly secondary to severe hypoglycemia from eating less and not tracking blood sugar carefully. Hospital course complicated by 1) prolonged hypoxemic respiratory failure. Difficult extubation requiring s/p trach and peg 2) left upper extremity/left subclavian DVT and placed on Eliquis 3) multiple infections (Enterococcal faecalis cellulitis, infected left hand hematoma, tracheobronchitis secondary to citrobacter 4) tonic clonic seizure - on Keppra / Depakote - seen by NEUROLOGY 5) fever of 102 on 1/19.     # Acute/Chronic Respiratory failure w/ Hypoxia and Hypercapnia/ COPD, now trach removed.   - S/p intubation; failed extubation requiring s/p trach and peg. Trach now removed.    - cont w/ Symbicort, Duonebs prn  - Frequent suction, PEG feeds now on hold, was advanced to puree diet. Tolerates well    # Myoclonic Seizure   - EEG: Myoclonic seizure, Neurology on board, cont w/ Keppra and Depakote.    # S/p Cardiac Arrest, acute metabolic encephalopathy due to severe hypoglycemia.  - s/p ACLS w/ ROSC after 5 min   - 2nd degree heart block, s/p PPM in place.  - ECHO Takotsubo cardiomyopathy. EF 50-55%, LVH, mild pHTN    - status post ICU course; Hemodynamically stable now.   - cont w/ Metoprolol, and atorvastatin.               # Occlusive Left subclavian thrombosis   - LUE venous duplex : occlusive thrombus in the left mid subclavian vein with partial slow flow detected in the lateral subclavian vein.  - LUE arterial Duplex neg, cont w/ Eliquis     # DM2 : A1c 8.8%, resume Lantus 10 units per day, glucose improved to 132. Cont w/ FS monitoring w/ insulin s/s coverage     # HTN, Cad s/p PCI w/ stent/ CABG and HPL.  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 Tube feeding on hold, puree diet. Added free water flush 250 ml tid for mild hypernatremia at 146.  Check CBC, SMA-7 weekly on Wednesday.      Stable for discharge to rehab when bed available.

## 2023-03-15 NOTE — PROGRESS NOTE ADULT - SUBJECTIVE AND OBJECTIVE BOX
Patient is a 72y old  Male who presents with a chief complaint of s/p cardiac arrest, hypoglycemia (15 Mar 2023 08:02)      INTERVAL HPI/OVERNIGHT EVENTS:    MEDICATIONS  (STANDING):  apixaban 5 milliGRAM(s) Oral every 12 hours  aspirin  chewable 81 milliGRAM(s) Oral daily  atorvastatin 20 milliGRAM(s) Oral at bedtime  bacitracin   Ointment 1 Application(s) Topical two times a day  budesonide 160 MICROgram(s)/formoterol 4.5 MICROgram(s) Inhaler 2 Puff(s) Inhalation two times a day  chlorhexidine 2% Cloths 1 Application(s) Topical <User Schedule>  cyanocobalamin 1000 MICROGram(s) Oral daily  dextrose 5%. 1000 milliLiter(s) (100 mL/Hr) IV Continuous <Continuous>  dextrose 5%. 1000 milliLiter(s) (50 mL/Hr) IV Continuous <Continuous>  dextrose 50% Injectable 25 Gram(s) IV Push once  dextrose 50% Injectable 12.5 Gram(s) IV Push once  dextrose 50% Injectable 25 Gram(s) IV Push once  glucagon  Injectable 1 milliGRAM(s) IntraMuscular once  insulin glargine Injectable (LANTUS) 10 Unit(s) SubCutaneous at bedtime  insulin lispro (ADMELOG) corrective regimen sliding scale   SubCutaneous three times a day before meals  insulin lispro (ADMELOG) corrective regimen sliding scale   SubCutaneous at bedtime  levETIRAcetam  Solution 1500 milliGRAM(s) Oral two times a day  metoprolol tartrate 25 milliGRAM(s) Oral two times a day  multivitamin/minerals/iron Oral Solution (CENTRUM) 15 milliLiter(s) Enteral Tube daily  pantoprazole   Suspension 40 milliGRAM(s) Oral before breakfast  polyethylene glycol 3350 17 Gram(s) Oral daily  senna 2 Tablet(s) Oral at bedtime  valproic  acid Syrup 1000 milliGRAM(s) Oral <User Schedule>  valproic  acid Syrup 750 milliGRAM(s) Oral <User Schedule>  vitamin A &amp; D Ointment 1 Application(s) Topical two times a day    MEDICATIONS  (PRN):  acetaminophen     Tablet .. 650 milliGRAM(s) Oral every 6 hours PRN Temp greater or equal to 38C (100.4F), Mild Pain (1 - 3)  albuterol/ipratropium for Nebulization 3 milliLiter(s) Nebulizer every 6 hours PRN Shortness of Breath and/or Wheezing  dextrose Oral Gel 15 Gram(s) Oral once PRN Blood Glucose LESS THAN 70 milliGRAM(s)/deciliter  melatonin 3 milliGRAM(s) Oral at bedtime PRN Insomnia      Allergies    No Known Allergies    Intolerances        Vital Signs Last 24 Hrs  T(C): 36.6 (15 Mar 2023 16:20), Max: 37.1 (14 Mar 2023 23:46)  T(F): 97.8 (15 Mar 2023 16:20), Max: 98.7 (14 Mar 2023 23:46)  HR: 77 (15 Mar 2023 16:20) (77 - 105)  BP: 120/74 (15 Mar 2023 16:20) (113/71 - 120/74)  BP(mean): --  RR: 18 (15 Mar 2023 16:20) (18 - 18)  SpO2: 96% (15 Mar 2023 16:20) (96% - 98%)    Parameters below as of 15 Mar 2023 16:20  Patient On (Oxygen Delivery Method): room air        PHYSICAL EXAM:  GENERAL: NAD, well-groomed, well-developed  HEAD:  Atraumatic, Normocephalic  EYES: EOMI, PERRLA, conjunctiva and sclera clear  ENMT: No tonsillar erythema, exudates, or enlargement; Moist mucous membranes, Good dentition, No lesions  NECK: Supple, No JVD, Normal thyroid  NERVOUS SYSTEM:  Alert & Oriented X3, Good concentration; Motor Strength 5/5 B/L upper and lower extremities; DTRs 2+ intact and symmetric  CHEST/LUNG: Clear to auscultation bilaterally; No rales, rhonchi, wheezing, or rubs  HEART: Regular rate and rhythm; No murmurs, rubs, or gallops  ABDOMEN: Soft, Nontender, Nondistended; Bowel sounds present  EXTREMITIES:  2+ Peripheral Pulses, No clubbing, cyanosis, or edema  LYMPH: No lymphadenopathy noted  SKIN: No rashes or lesions    LABS:         POCT Blood Glucose.: 123 mg/dL (15 Mar 2023 20:59)  POCT Blood Glucose.: 186 mg/dL (15 Mar 2023 16:30)  POCT Blood Glucose.: 191 mg/dL (15 Mar 2023 11:17)  POCT Blood Glucose.: 76 mg/dL (15 Mar 2023 07:48)      RADIOLOGY & ADDITIONAL TESTS:    03-14-23 @ 07:01  -  03-15-23 @ 07:00  --------------------------------------------------------  IN:    Free Water: 652 mL    Oral Fluid: 360 mL  Total IN: 1012 mL    OUT:  Total OUT: 0 mL    Total NET: 1012 mL

## 2023-03-15 NOTE — PROGRESS NOTE ADULT - ASSESSMENT
GENERAL DATA .   [facility].  12/11/2022 full code       ALLGY.  nka                            WT. ..  12/2/2022 86  BMI. ..    12/2/2022 29                          ICU STAY.  .. 11/29-12/9  COVID.   .. 12/2/2022 scv2 (-)   .. 11/20/2022 scv2 (-)   BEST PRACTICE ISSUES.    HOB ELEVATN. Yes  DVT PPLX.    .. 1/3/2023 apixa 5.2 (vte)    (DVT 12/12 l Subclav throm)  ALEJO PPLX.   INFN PPLX.   .. 11/14 chlorhex 2%   SP SW ANTWAN.         DIET.    ..  12/15 glucerna 1.2 1440 gt   IV fl.    PROCEDURES.  .. 3/7/2023 trach decannulatd     PATIENT PRESENTATION.  72 m doa 11/14/2022 cac    PMH  PMH CAD s/p PCI with stent 2015 and CABG 2014,   PMH  s/p medtronic PPM,   PMH CVA (lacunar infarct)    HOSPITAL COURSE   .. 1) PEA arrest with ROSC after approximately 5 min 11/14  Now communicative   .. 2) DVT 12/12 l Subclav thromS 12/15/2022 lvnx 80.2 1/3 changed to apixaban 5.2  .. 3) TRACH 12/5/2022 trach  .. 4) PEG12/5/2022 peg   .. 5) Cellulitis hand absc 12/13 mod enterococcus fecalis  staph epi 12/12-12/15/2022  cephalexin 12/15 vanco once  12/16-12/19 zosyn  .. 6) Vent weaning   .. 7) trach decannulatn 3.7      PROBLEM ASSESSMENT RECOMMENDATIONS.  Trach decannultion.  .. 3/7/2023 2p Pt had tolerated cap for 48 h and had minimal secretions RBAA explained to son and pt and pt decannulated after both approved plan  3/7/2023 5:20 PM 2l 743/54/110  .. 3/8/2023 doing well post decannulatn  .. 3/10/2023 ra po 94%  .. 3/11/2023 doing well post decannulation   SLEEP APNEA.  .. 3/5/2023 cpap ordered  .. 3/6/2023 pt unable to tolerate cpap   .. 3/7/2023 cpap dced   VTE  .. On apixaba  CAD.  .. 11/14 asa 81   .. 12/13 metoprolol 25.2   CHF   .. Cr 2/6/2023 Cr .7   .. 11/14/2022 echo mod decr segmental lvsf apical lateral apiccal ant segment are abn takotsubo cmpthy pasp 42 .  .. Monitor for chf has chr hfref per echo   COPD   .. 2/1 duoneb p   .. 12/30 symbicort 160   .. 1/7/2023 glycopyrrolate 1.2   .. 1/9/2023 ipratropium  .. 1/15/2023 scopolamine   .. continue bd ics for copd   Anemia.  .. Hb 1/12-1/14-1/19-1/20-7/21-1/25-2/3-2/8-2/10-2/15-2/27/2023       Hb 9.8- 10 -9.1- 9.8  - 9.2 - 9.5 -10- 10 - 10.5 -10.8- 11   .. target hb 7 (+)  .. monitor   sp cac   .. good neuro recovery awake alert interactive   VTE  .. 1/3/2023 apixa 5.2 (vte)      TIME SPENT   Over 25 minutes aggregate care time spent on encounter; activities included   direct patient care, counseling and/or coordinating care reviewing notes, lab data/ imaging , discussion with multidisciplinary team/ patient  /family and explaining in detail risks, benefits, alternatives  of the recommendations     BRANDON CAMARILLO

## 2023-03-16 LAB
ALBUMIN SERPL ELPH-MCNC: 2.1 G/DL — LOW (ref 3.3–5)
ALP SERPL-CCNC: 54 U/L — SIGNIFICANT CHANGE UP (ref 40–120)
ALT FLD-CCNC: 23 U/L — SIGNIFICANT CHANGE UP (ref 12–78)
ANION GAP SERPL CALC-SCNC: 3 MMOL/L — LOW (ref 5–17)
AST SERPL-CCNC: 30 U/L — SIGNIFICANT CHANGE UP (ref 15–37)
BILIRUB SERPL-MCNC: 0.2 MG/DL — SIGNIFICANT CHANGE UP (ref 0.2–1.2)
BUN SERPL-MCNC: 12 MG/DL — SIGNIFICANT CHANGE UP (ref 7–23)
CALCIUM SERPL-MCNC: 8.1 MG/DL — LOW (ref 8.5–10.1)
CHLORIDE SERPL-SCNC: 106 MMOL/L — SIGNIFICANT CHANGE UP (ref 96–108)
CO2 SERPL-SCNC: 31 MMOL/L — SIGNIFICANT CHANGE UP (ref 22–31)
CREAT SERPL-MCNC: 0.8 MG/DL — SIGNIFICANT CHANGE UP (ref 0.5–1.3)
EGFR: 93 ML/MIN/1.73M2 — SIGNIFICANT CHANGE UP
GLUCOSE BLDC GLUCOMTR-MCNC: 126 MG/DL — HIGH (ref 70–99)
GLUCOSE BLDC GLUCOMTR-MCNC: 164 MG/DL — HIGH (ref 70–99)
GLUCOSE BLDC GLUCOMTR-MCNC: 178 MG/DL — HIGH (ref 70–99)
GLUCOSE BLDC GLUCOMTR-MCNC: 236 MG/DL — HIGH (ref 70–99)
GLUCOSE BLDC GLUCOMTR-MCNC: 87 MG/DL — SIGNIFICANT CHANGE UP (ref 70–99)
GLUCOSE SERPL-MCNC: 130 MG/DL — HIGH (ref 70–99)
HCT VFR BLD CALC: 37.1 % — LOW (ref 39–50)
HGB BLD-MCNC: 12 G/DL — LOW (ref 13–17)
MAGNESIUM SERPL-MCNC: 2 MG/DL — SIGNIFICANT CHANGE UP (ref 1.6–2.6)
MCHC RBC-ENTMCNC: 30 PG — SIGNIFICANT CHANGE UP (ref 27–34)
MCHC RBC-ENTMCNC: 32.3 G/DL — SIGNIFICANT CHANGE UP (ref 32–36)
MCV RBC AUTO: 92.8 FL — SIGNIFICANT CHANGE UP (ref 80–100)
NRBC # BLD: 0 /100 WBCS — SIGNIFICANT CHANGE UP (ref 0–0)
PHOSPHATE SERPL-MCNC: 2.6 MG/DL — SIGNIFICANT CHANGE UP (ref 2.5–4.5)
PLATELET # BLD AUTO: 304 K/UL — SIGNIFICANT CHANGE UP (ref 150–400)
POTASSIUM SERPL-MCNC: 3.2 MMOL/L — LOW (ref 3.5–5.3)
POTASSIUM SERPL-SCNC: 3.2 MMOL/L — LOW (ref 3.5–5.3)
PROT SERPL-MCNC: 5.8 GM/DL — LOW (ref 6–8.3)
RBC # BLD: 4 M/UL — LOW (ref 4.2–5.8)
RBC # FLD: 15.3 % — HIGH (ref 10.3–14.5)
SODIUM SERPL-SCNC: 140 MMOL/L — SIGNIFICANT CHANGE UP (ref 135–145)
WBC # BLD: 8.7 K/UL — SIGNIFICANT CHANGE UP (ref 3.8–10.5)
WBC # FLD AUTO: 8.7 K/UL — SIGNIFICANT CHANGE UP (ref 3.8–10.5)

## 2023-03-16 PROCEDURE — 99232 SBSQ HOSP IP/OBS MODERATE 35: CPT

## 2023-03-16 RX ORDER — POTASSIUM CHLORIDE 20 MEQ
40 PACKET (EA) ORAL EVERY 4 HOURS
Refills: 0 | Status: COMPLETED | OUTPATIENT
Start: 2023-03-16 | End: 2023-03-17

## 2023-03-16 RX ADMIN — Medication 15 MILLILITER(S): at 11:41

## 2023-03-16 RX ADMIN — Medication 1 APPLICATION(S): at 17:33

## 2023-03-16 RX ADMIN — Medication 25 MILLIGRAM(S): at 17:24

## 2023-03-16 RX ADMIN — BUDESONIDE AND FORMOTEROL FUMARATE DIHYDRATE 2 PUFF(S): 160; 4.5 AEROSOL RESPIRATORY (INHALATION) at 17:25

## 2023-03-16 RX ADMIN — Medication 40 MILLIEQUIVALENT(S): at 21:58

## 2023-03-16 RX ADMIN — Medication 4: at 11:46

## 2023-03-16 RX ADMIN — Medication 1 APPLICATION(S): at 06:18

## 2023-03-16 RX ADMIN — APIXABAN 5 MILLIGRAM(S): 2.5 TABLET, FILM COATED ORAL at 06:05

## 2023-03-16 RX ADMIN — CHLORHEXIDINE GLUCONATE 1 APPLICATION(S): 213 SOLUTION TOPICAL at 06:00

## 2023-03-16 RX ADMIN — Medication 81 MILLIGRAM(S): at 11:40

## 2023-03-16 RX ADMIN — Medication 25 MILLIGRAM(S): at 06:05

## 2023-03-16 RX ADMIN — Medication 2: at 17:33

## 2023-03-16 RX ADMIN — PREGABALIN 1000 MICROGRAM(S): 225 CAPSULE ORAL at 11:40

## 2023-03-16 RX ADMIN — BUDESONIDE AND FORMOTEROL FUMARATE DIHYDRATE 2 PUFF(S): 160; 4.5 AEROSOL RESPIRATORY (INHALATION) at 06:16

## 2023-03-16 RX ADMIN — SENNA PLUS 2 TABLET(S): 8.6 TABLET ORAL at 21:58

## 2023-03-16 RX ADMIN — Medication 40 MILLIEQUIVALENT(S): at 19:05

## 2023-03-16 RX ADMIN — APIXABAN 5 MILLIGRAM(S): 2.5 TABLET, FILM COATED ORAL at 17:24

## 2023-03-16 RX ADMIN — Medication 1000 MILLIGRAM(S): at 20:14

## 2023-03-16 RX ADMIN — LEVETIRACETAM 1500 MILLIGRAM(S): 250 TABLET, FILM COATED ORAL at 06:05

## 2023-03-16 RX ADMIN — Medication 1 APPLICATION(S): at 17:24

## 2023-03-16 RX ADMIN — PANTOPRAZOLE SODIUM 40 MILLIGRAM(S): 20 TABLET, DELAYED RELEASE ORAL at 06:05

## 2023-03-16 RX ADMIN — Medication 750 MILLIGRAM(S): at 08:02

## 2023-03-16 RX ADMIN — ATORVASTATIN CALCIUM 20 MILLIGRAM(S): 80 TABLET, FILM COATED ORAL at 21:58

## 2023-03-16 RX ADMIN — LEVETIRACETAM 1500 MILLIGRAM(S): 250 TABLET, FILM COATED ORAL at 17:25

## 2023-03-16 NOTE — PROGRESS NOTE ADULT - ASSESSMENT
73 yo male w/ PMH of COPD, CAD sp PCI w/ stent, CABG 2014, HF w/ PeF, 2nd degree heart block, sp PPM, HTN, DM, CKD Stage 3, CVA- lacunar infarcts admitted to ICU originally for cardiac arrest. ACLS for PEA arrest and ROSC returned after 5 minutes. Cardiac arrest possibly secondary to severe hypoglycemia from eating less and not tracking blood sugar carefully. Hospital course complicated by 1) prolonged hypoxemic respiratory failure. Difficult extubation requiring s/p trach and peg 2) left upper extremity/left subclavian DVT and placed on Eliquis 3) multiple infections (Enterococcal faecalis cellulitis, infected left hand hematoma, tracheobronchitis secondary to citrobacter 4) tonic clonic seizure - on Keppra / Depakote - seen by NEUROLOGY 5) fever of 102 on 1/19.     # Acute/Chronic Respiratory failure w/ Hypoxia and Hypercapnia/ COPD, now trach removed.   - S/p intubation; failed extubation requiring s/p trach and peg. Trach now removed.    - cont w/ Symbicort, Duonebs prn  - Frequent suction, PEG feeds now on hold, was advanced to puree diet. Tolerates well    Hypokalemia  - replace w/ KCl    # Myoclonic Seizure   - EEG: Myoclonic seizure, Neurology on board, cont w/ Keppra and Depakote.    # S/p Cardiac Arrest, acute metabolic encephalopathy due to severe hypoglycemia.  - s/p ACLS w/ ROSC after 5 min   - 2nd degree heart block, s/p PPM in place.  - ECHO Takotsubo cardiomyopathy. EF 50-55%, LVH, mild pHTN    - status post ICU course; Hemodynamically stable now.   - cont w/ Metoprolol, and atorvastatin.               # Occlusive Left subclavian thrombosis   - LUE venous duplex : occlusive thrombus in the left mid subclavian vein with partial slow flow detected in the lateral subclavian vein.  - LUE arterial Duplex neg, cont w/ Eliquis     # DM2 : A1c 8.8%, resume Lantus 10 units per day, glucose improved to 132. Cont w/ FS monitoring w/ insulin s/s coverage     # HTN, Cad s/p PCI w/ stent/ CABG and HPL.  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 Tube feeding on hold, puree diet. Added free water flush 250 ml tid for mild hypernatremia at 146.  Check CBC, SMA-7 weekly on Wednesday.      Stable for discharge to rehab when bed available.

## 2023-03-16 NOTE — PROGRESS NOTE ADULT - SUBJECTIVE AND OBJECTIVE BOX
BRANDON MÉNDEZL    [address]    Allergies    No Known Allergies    Intolerances        PAST MEDICAL & SURGICAL HISTORY:  HTN (hypertension)      HLD (hyperlipidemia)      Diabetes mellitus      Lacunar infarction      BPH (benign prostatic hyperplasia)      CHF (congestive heart failure)      Chronic obstructive pulmonary disease, unspecified COPD type      S/P CABG (coronary artery bypass graft)  2014          FAMILY HISTORY:      Home Medications:  aspirin 81 mg oral delayed release tablet: 1 tab(s) orally once a day (10 Mar 2023 16:30)  Liquifilm Tears preserved ophthalmic solution: 1 drop(s) to each affected eye 2 times a day (10 Mar 2023 16:30)  tamsulosin 0.4 mg oral capsule: 1 cap(s) orally once a day (14 Mar 2023 16:07)      MEDICATIONS  (STANDING):  apixaban 5 milliGRAM(s) Oral every 12 hours  aspirin  chewable 81 milliGRAM(s) Oral daily  atorvastatin 20 milliGRAM(s) Oral at bedtime  bacitracin   Ointment 1 Application(s) Topical two times a day  budesonide 160 MICROgram(s)/formoterol 4.5 MICROgram(s) Inhaler 2 Puff(s) Inhalation two times a day  chlorhexidine 2% Cloths 1 Application(s) Topical <User Schedule>  cyanocobalamin 1000 MICROGram(s) Oral daily  dextrose 5%. 1000 milliLiter(s) (50 mL/Hr) IV Continuous <Continuous>  dextrose 5%. 1000 milliLiter(s) (100 mL/Hr) IV Continuous <Continuous>  dextrose 50% Injectable 25 Gram(s) IV Push once  dextrose 50% Injectable 12.5 Gram(s) IV Push once  dextrose 50% Injectable 25 Gram(s) IV Push once  glucagon  Injectable 1 milliGRAM(s) IntraMuscular once  insulin glargine Injectable (LANTUS) 10 Unit(s) SubCutaneous at bedtime  insulin lispro (ADMELOG) corrective regimen sliding scale   SubCutaneous three times a day before meals  insulin lispro (ADMELOG) corrective regimen sliding scale   SubCutaneous at bedtime  levETIRAcetam  Solution 1500 milliGRAM(s) Oral two times a day  metoprolol tartrate 25 milliGRAM(s) Oral two times a day  multivitamin/minerals/iron Oral Solution (CENTRUM) 15 milliLiter(s) Enteral Tube daily  pantoprazole   Suspension 40 milliGRAM(s) Oral before breakfast  polyethylene glycol 3350 17 Gram(s) Oral daily  senna 2 Tablet(s) Oral at bedtime  valproic  acid Syrup 1000 milliGRAM(s) Oral <User Schedule>  valproic  acid Syrup 750 milliGRAM(s) Oral <User Schedule>  vitamin A &amp; D Ointment 1 Application(s) Topical two times a day    MEDICATIONS  (PRN):  acetaminophen     Tablet .. 650 milliGRAM(s) Oral every 6 hours PRN Temp greater or equal to 38C (100.4F), Mild Pain (1 - 3)  albuterol/ipratropium for Nebulization 3 milliLiter(s) Nebulizer every 6 hours PRN Shortness of Breath and/or Wheezing  dextrose Oral Gel 15 Gram(s) Oral once PRN Blood Glucose LESS THAN 70 milliGRAM(s)/deciliter  melatonin 3 milliGRAM(s) Oral at bedtime PRN Insomnia      Diet, Pureed:   Consistent Carbohydrate Evening Snack  Low Sodium  Lacto-Ovo Veg (Accepts Milk Prod., Eggs)  Supplement Feeding Modality:  Oral  Glucerna Shake Cans or Servings Per Day:  1       Frequency:  Three Times a day (03-13-23 @ 12:08) [Active]          Vital Signs Last 24 Hrs  T(C): 36.7 (16 Mar 2023 04:52), Max: 36.7 (16 Mar 2023 04:52)  T(F): 98.1 (16 Mar 2023 04:52), Max: 98.1 (16 Mar 2023 04:52)  HR: 88 (16 Mar 2023 04:52) (77 - 88)  BP: 168/81 (16 Mar 2023 04:52) (113/71 - 168/81)  BP(mean): --  RR: 18 (16 Mar 2023 04:52) (18 - 18)  SpO2: 99% (16 Mar 2023 04:52) (96% - 99%)    Parameters below as of 16 Mar 2023 04:52  Patient On (Oxygen Delivery Method): room air                  LABS:                    WBC:      MICROBIOLOGY:  RECENT CULTURES:                  Sodium:          Hemoglobin:      Platelets:             RADIOLOGY & ADDITIONAL STUDIES:      MICROBIOLOGY:  RECENT CULTURES:

## 2023-03-16 NOTE — PROGRESS NOTE ADULT - SUBJECTIVE AND OBJECTIVE BOX
Patient is a 72y old  Male who presents with a chief complaint of s/p cardiac arrest, hypoglycemia (16 Mar 2023 08:12)      INTERVAL HPI/OVERNIGHT EVENTS:    MEDICATIONS  (STANDING):  apixaban 5 milliGRAM(s) Oral every 12 hours  aspirin  chewable 81 milliGRAM(s) Oral daily  atorvastatin 20 milliGRAM(s) Oral at bedtime  bacitracin   Ointment 1 Application(s) Topical two times a day  budesonide 160 MICROgram(s)/formoterol 4.5 MICROgram(s) Inhaler 2 Puff(s) Inhalation two times a day  chlorhexidine 2% Cloths 1 Application(s) Topical <User Schedule>  cyanocobalamin 1000 MICROGram(s) Oral daily  dextrose 5%. 1000 milliLiter(s) (100 mL/Hr) IV Continuous <Continuous>  dextrose 5%. 1000 milliLiter(s) (50 mL/Hr) IV Continuous <Continuous>  dextrose 50% Injectable 25 Gram(s) IV Push once  dextrose 50% Injectable 12.5 Gram(s) IV Push once  dextrose 50% Injectable 25 Gram(s) IV Push once  glucagon  Injectable 1 milliGRAM(s) IntraMuscular once  insulin glargine Injectable (LANTUS) 10 Unit(s) SubCutaneous at bedtime  insulin lispro (ADMELOG) corrective regimen sliding scale   SubCutaneous three times a day before meals  insulin lispro (ADMELOG) corrective regimen sliding scale   SubCutaneous at bedtime  levETIRAcetam  Solution 1500 milliGRAM(s) Oral two times a day  metoprolol tartrate 25 milliGRAM(s) Oral two times a day  multivitamin/minerals/iron Oral Solution (CENTRUM) 15 milliLiter(s) Enteral Tube daily  pantoprazole   Suspension 40 milliGRAM(s) Oral before breakfast  polyethylene glycol 3350 17 Gram(s) Oral daily  senna 2 Tablet(s) Oral at bedtime  valproic  acid Syrup 1000 milliGRAM(s) Oral <User Schedule>  valproic  acid Syrup 750 milliGRAM(s) Oral <User Schedule>  vitamin A &amp; D Ointment 1 Application(s) Topical two times a day    MEDICATIONS  (PRN):  acetaminophen     Tablet .. 650 milliGRAM(s) Oral every 6 hours PRN Temp greater or equal to 38C (100.4F), Mild Pain (1 - 3)  albuterol/ipratropium for Nebulization 3 milliLiter(s) Nebulizer every 6 hours PRN Shortness of Breath and/or Wheezing  dextrose Oral Gel 15 Gram(s) Oral once PRN Blood Glucose LESS THAN 70 milliGRAM(s)/deciliter  melatonin 3 milliGRAM(s) Oral at bedtime PRN Insomnia      Allergies    No Known Allergies    Intolerances        Vital Signs Last 24 Hrs  T(C): 36.4 (16 Mar 2023 16:25), Max: 37 (16 Mar 2023 10:54)  T(F): 97.6 (16 Mar 2023 16:25), Max: 98.6 (16 Mar 2023 10:54)  HR: 85 (16 Mar 2023 16:25) (73 - 88)  BP: 109/70 (16 Mar 2023 16:25) (109/70 - 168/81)  BP(mean): --  RR: 18 (16 Mar 2023 16:25) (18 - 18)  SpO2: 98% (16 Mar 2023 16:25) (95% - 99%)    Parameters below as of 16 Mar 2023 16:25  Patient On (Oxygen Delivery Method): room air        PHYSICAL EXAM:  GENERAL: NAD, well-groomed, well-developed  HEAD:  Atraumatic, Normocephalic  EYES: EOMI, PERRLA, conjunctiva and sclera clear  ENMT: No tonsillar erythema, exudates, or enlargement; Moist mucous membranes, Good dentition, No lesions  NECK: Supple, No JVD, Normal thyroid  NERVOUS SYSTEM:  Alert & Oriented X3, Good concentration; Motor Strength 5/5 B/L upper and lower extremities; DTRs 2+ intact and symmetric  CHEST/LUNG: Clear to auscultation bilaterally; No rales, rhonchi, wheezing, or rubs  HEART: Regular rate and rhythm; No murmurs, rubs, or gallops  ABDOMEN: Soft, Nontender, Nondistended; Bowel sounds present  EXTREMITIES:  2+ Peripheral Pulses, No clubbing, cyanosis, or edema  LYMPH: No lymphadenopathy noted  SKIN: No rashes or lesions    LABS:                        12.0   8.70  )-----------( 304      ( 16 Mar 2023 07:38 )             37.1     03-16    140  |  106  |  12  ----------------------------<  130<H>  3.2<L>   |  31  |  0.80    Ca    8.1<L>      16 Mar 2023 07:38  Phos  2.6     03-16  Mg     2.0     03-16    TPro  5.8<L>  /  Alb  2.1<L>  /  TBili  0.2  /  DBili  x   /  AST  30  /  ALT  23  /  AlkPhos  54  03-16        CAPILLARY BLOOD GLUCOSE      POCT Blood Glucose.: 178 mg/dL (16 Mar 2023 17:27)  POCT Blood Glucose.: 164 mg/dL (16 Mar 2023 16:17)  POCT Blood Glucose.: 236 mg/dL (16 Mar 2023 11:25)  POCT Blood Glucose.: 126 mg/dL (16 Mar 2023 07:52)  POCT Blood Glucose.: 123 mg/dL (15 Mar 2023 20:59)      RADIOLOGY & ADDITIONAL TESTS:    03-16-23 @ 07:01  -  03-16-23 @ 18:36  --------------------------------------------------------  IN:    Oral Fluid: 360 mL  Total IN: 360 mL    OUT:  Total OUT: 0 mL    Total NET: 360 mL

## 2023-03-16 NOTE — CHART NOTE - NSCHARTNOTESELECT_GEN_ALL_CORE
Event Note
Nutrition Services
PT Screen
Pulmonary/Event Note
heparin gtt/Event Note
Event Note
Event Note
Neurology
Neurology
Nutrition Services
Tracheostomy Exchange/Off Service Note
Transfer Note
pre op note/Event Note

## 2023-03-16 NOTE — CHART NOTE - NSCHARTNOTEFT_GEN_A_CORE
Pt adm c dx of hypoglycemia, cardiac arrest, hypothermia, s/p critical care stay, hospital course complicated by prolonged hypoxemic respiratory failure, acute/chronic respiratory failure, acute metabolic encephalopathy due to severe hypoglycemia, s/p ELMER, shock liver, COPD, s/p trach,  s/p PEG, DVT, multiple infections,  myoclonic seizure, occlusive left subclavian thrombosis,  anemia of chronic disease, functional quadriplegia, sacral decubiti, DM, anemia of chronic disease, constipation.  PMH include COPD, CAD, CABG, CHF, 2nd degree heart block, PPM, HTN, DM( was on Jardiance, metformin , Victoza, Lispro, Lantus PTA), CKD, CVA, BPH.  Pt is stable for discharge to Rehab.   Moderate Malnutrition in Acute illness continues.     Factors impacting intake: [ ] none [ ] nausea  [ ] vomiting [ ] diarrhea [ ] constipation  [ ]chewing problems [ ] swallowing issues  [x ] other: Total Feed    3/13 - Diet Prescription: Diet, Pureed:   Consistent Carbohydrate {Evening Snack}  Low Sodium  Lacto-Ovo Veg (Accepts Milk Prod., Eggs)  Supplement Feeding Modality:  Oral  Glucerna Shake Cans or Servings Per Day:  1       Frequency:  Three Times a day (03-13-23 @ 12:08)    Intake: PEG feeding on hold as diet advanced to pureed.  Pt is consuming 75% of meals. Drinks Glucerna on occasion.    Current Weight: 3/7 - unable to weigh patient ; 2/13 - 247.7 (112.4 kg)  % Weight Change - unable to trend weights    NFPE - unable to perform as pt is restless and moving around.   3/12 - no edema documented in flow sheets.       Pertinent Medications: MEDICATIONS  (STANDING):  apixaban 5 milliGRAM(s) Oral every 12 hours  aspirin  chewable 81 milliGRAM(s) Oral daily  atorvastatin 20 milliGRAM(s) Oral at bedtime  bacitracin   Ointment 1 Application(s) Topical two times a day  budesonide 160 MICROgram(s)/formoterol 4.5 MICROgram(s) Inhaler 2 Puff(s) Inhalation two times a day  chlorhexidine 2% Cloths 1 Application(s) Topical <User Schedule>  cyanocobalamin 1000 MICROGram(s) Oral daily  dextrose 5%. 1000 milliLiter(s) (50 mL/Hr) IV Continuous <Continuous>  dextrose 5%. 1000 milliLiter(s) (100 mL/Hr) IV Continuous <Continuous>  dextrose 50% Injectable 25 Gram(s) IV Push once  dextrose 50% Injectable 12.5 Gram(s) IV Push once  dextrose 50% Injectable 25 Gram(s) IV Push once  glucagon  Injectable 1 milliGRAM(s) IntraMuscular once  insulin glargine Injectable (LANTUS) 10 Unit(s) SubCutaneous at bedtime  insulin lispro (ADMELOG) corrective regimen sliding scale   SubCutaneous three times a day before meals  insulin lispro (ADMELOG) corrective regimen sliding scale   SubCutaneous at bedtime  levETIRAcetam  Solution 1500 milliGRAM(s) Oral two times a day  metoprolol tartrate 25 milliGRAM(s) Oral two times a day  multivitamin/minerals/iron Oral Solution (CENTRUM) 15 milliLiter(s) Enteral Tube daily  pantoprazole   Suspension 40 milliGRAM(s) Oral before breakfast  polyethylene glycol 3350 17 Gram(s) Oral daily  senna 2 Tablet(s) Oral at bedtime  valproic  acid Syrup 1000 milliGRAM(s) Oral <User Schedule>  valproic  acid Syrup 750 milliGRAM(s) Oral <User Schedule>  vitamin A &amp; D Ointment 1 Application(s) Topical two times a day    MEDICATIONS  (PRN):  acetaminophen     Tablet .. 650 milliGRAM(s) Oral every 6 hours PRN Temp greater or equal to 38C (100.4F), Mild Pain (1 - 3)  albuterol/ipratropium for Nebulization 3 milliLiter(s) Nebulizer every 6 hours PRN Shortness of Breath and/or Wheezing  dextrose Oral Gel 15 Gram(s) Oral once PRN Blood Glucose LESS THAN 70 milliGRAM(s)/deciliter  melatonin 3 milliGRAM(s) Oral at bedtime PRN Insomnia    Pertinent Labs: 03-16 Na140 mmol/L Glu 130 mg/dL<H> K+ 3.2 mmol/L<L> Cr  0.80 mg/dL BUN 12 mg/dL 03-16 Phos 2.6 mg/dL 03-16 Alb 2.1 g/dL<L>03-16 ALT 23 U/L AST 30 U/L Alkaline Phosphatase 54 U/L     CAPILLARY BLOOD GLUCOSE      POCT Blood Glucose.: 126 mg/dL (16 Mar 2023 07:52)  POCT Blood Glucose.: 123 mg/dL (15 Mar 2023 20:59)  POCT Blood Glucose.: 186 mg/dL (15 Mar 2023 16:30)  POCT Blood Glucose.: 191 mg/dL (15 Mar 2023 11:17)    Skin: R buttock - skin tear          Sacrum - stage 2    Estimated Needs:   [x ] no change since previous assessment ( 11/06 & 11/23 )  [ ] recalculated:     Previous Nutrition Diagnosis:   Previous Nutrition Diagnosis: (12/23)  [x ] Malnutrition; moderate malnutrition in context of acute illness   Related to: increased protein energy needs in setting of cardiac arrest, acute respiratory failure, s/p ELMER, shock liver, pressure ulcers/wounds  As evidenced by: physical findings of mild muscle wasting & mild/moderate fat depletion   GOAL: pt to meet >75% protein-energy needs via tube feeding  New Goal; pt to meet >75% protein-energy needs via oral/enteral feeding; Goal Met    Nutrition Diagnosis is [x ] ongoing  [ ] resolved [ ] not applicable     New Nutrition Diagnosis: [x ] not applicable       Interventions:   Recommend-> Continue current diet as Rx'd  [ ] Change Diet To:  [ ] Nutrition Supplement  [ ] Nutrition Support- ; if oral feeding is to be held, recommend resume continuous enteral feeding regimen of Glucerna 1.2 @ goal rate of 60 mL/hr x 24 hrs (1728 oscar, 86 gm pro, 1166 mL free water, 120% RDIs)   [ x] Other: swallow re-evaluation if concerns of altered swallow.     Monitoring and Evaluation:   [x ] PO intake [ x ] Tolerance to diet prescription [ x ] weights [ x ] labs[ x ] follow up per protocol  [ ] other:

## 2023-03-17 LAB
ANION GAP SERPL CALC-SCNC: 5 MMOL/L — SIGNIFICANT CHANGE UP (ref 5–17)
BUN SERPL-MCNC: 12 MG/DL — SIGNIFICANT CHANGE UP (ref 7–23)
CALCIUM SERPL-MCNC: 8.7 MG/DL — SIGNIFICANT CHANGE UP (ref 8.5–10.1)
CHLORIDE SERPL-SCNC: 110 MMOL/L — HIGH (ref 96–108)
CO2 SERPL-SCNC: 28 MMOL/L — SIGNIFICANT CHANGE UP (ref 22–31)
CREAT SERPL-MCNC: 0.82 MG/DL — SIGNIFICANT CHANGE UP (ref 0.5–1.3)
EGFR: 92 ML/MIN/1.73M2 — SIGNIFICANT CHANGE UP
GLUCOSE BLDC GLUCOMTR-MCNC: 127 MG/DL — HIGH (ref 70–99)
GLUCOSE BLDC GLUCOMTR-MCNC: 140 MG/DL — HIGH (ref 70–99)
GLUCOSE BLDC GLUCOMTR-MCNC: 164 MG/DL — HIGH (ref 70–99)
GLUCOSE BLDC GLUCOMTR-MCNC: 228 MG/DL — HIGH (ref 70–99)
GLUCOSE BLDC GLUCOMTR-MCNC: 264 MG/DL — HIGH (ref 70–99)
GLUCOSE SERPL-MCNC: 200 MG/DL — HIGH (ref 70–99)
MAGNESIUM SERPL-MCNC: 2.2 MG/DL — SIGNIFICANT CHANGE UP (ref 1.6–2.6)
PHOSPHATE SERPL-MCNC: 2.2 MG/DL — LOW (ref 2.5–4.5)
POTASSIUM SERPL-MCNC: 4.1 MMOL/L — SIGNIFICANT CHANGE UP (ref 3.5–5.3)
POTASSIUM SERPL-SCNC: 4.1 MMOL/L — SIGNIFICANT CHANGE UP (ref 3.5–5.3)
SODIUM SERPL-SCNC: 143 MMOL/L — SIGNIFICANT CHANGE UP (ref 135–145)

## 2023-03-17 PROCEDURE — 99232 SBSQ HOSP IP/OBS MODERATE 35: CPT

## 2023-03-17 RX ORDER — SODIUM,POTASSIUM PHOSPHATES 278-250MG
2 POWDER IN PACKET (EA) ORAL
Refills: 0 | Status: DISCONTINUED | OUTPATIENT
Start: 2023-03-17 | End: 2023-03-18

## 2023-03-17 RX ADMIN — LEVETIRACETAM 1500 MILLIGRAM(S): 250 TABLET, FILM COATED ORAL at 17:46

## 2023-03-17 RX ADMIN — Medication 1 APPLICATION(S): at 05:28

## 2023-03-17 RX ADMIN — BUDESONIDE AND FORMOTEROL FUMARATE DIHYDRATE 2 PUFF(S): 160; 4.5 AEROSOL RESPIRATORY (INHALATION) at 17:44

## 2023-03-17 RX ADMIN — Medication 25 MILLIGRAM(S): at 17:53

## 2023-03-17 RX ADMIN — PREGABALIN 1000 MICROGRAM(S): 225 CAPSULE ORAL at 11:21

## 2023-03-17 RX ADMIN — POLYETHYLENE GLYCOL 3350 17 GRAM(S): 17 POWDER, FOR SOLUTION ORAL at 11:21

## 2023-03-17 RX ADMIN — Medication 15 MILLILITER(S): at 11:20

## 2023-03-17 RX ADMIN — BUDESONIDE AND FORMOTEROL FUMARATE DIHYDRATE 2 PUFF(S): 160; 4.5 AEROSOL RESPIRATORY (INHALATION) at 05:24

## 2023-03-17 RX ADMIN — Medication 1 APPLICATION(S): at 18:47

## 2023-03-17 RX ADMIN — ATORVASTATIN CALCIUM 20 MILLIGRAM(S): 80 TABLET, FILM COATED ORAL at 21:45

## 2023-03-17 RX ADMIN — LEVETIRACETAM 1500 MILLIGRAM(S): 250 TABLET, FILM COATED ORAL at 05:23

## 2023-03-17 RX ADMIN — INSULIN GLARGINE 10 UNIT(S): 100 INJECTION, SOLUTION SUBCUTANEOUS at 21:47

## 2023-03-17 RX ADMIN — CHLORHEXIDINE GLUCONATE 1 APPLICATION(S): 213 SOLUTION TOPICAL at 05:22

## 2023-03-17 RX ADMIN — Medication 750 MILLIGRAM(S): at 08:17

## 2023-03-17 RX ADMIN — Medication 4: at 17:44

## 2023-03-17 RX ADMIN — PANTOPRAZOLE SODIUM 40 MILLIGRAM(S): 20 TABLET, DELAYED RELEASE ORAL at 05:21

## 2023-03-17 RX ADMIN — Medication 40 MILLIEQUIVALENT(S): at 02:09

## 2023-03-17 RX ADMIN — Medication 2: at 21:47

## 2023-03-17 RX ADMIN — APIXABAN 5 MILLIGRAM(S): 2.5 TABLET, FILM COATED ORAL at 17:45

## 2023-03-17 RX ADMIN — Medication 1000 MILLIGRAM(S): at 21:45

## 2023-03-17 RX ADMIN — Medication 25 MILLIGRAM(S): at 05:22

## 2023-03-17 RX ADMIN — Medication 1 APPLICATION(S): at 17:44

## 2023-03-17 RX ADMIN — Medication 2: at 08:12

## 2023-03-17 RX ADMIN — Medication 81 MILLIGRAM(S): at 11:22

## 2023-03-17 RX ADMIN — APIXABAN 5 MILLIGRAM(S): 2.5 TABLET, FILM COATED ORAL at 05:22

## 2023-03-17 NOTE — PROGRESS NOTE ADULT - ASSESSMENT
73 yo male w/ PMH of COPD, CAD sp PCI w/ stent, CABG 2014, HF w/ PeF, 2nd degree heart block, sp PPM, HTN, DM, CKD Stage 3, CVA- lacunar infarcts admitted to ICU originally for cardiac arrest. ACLS for PEA arrest and ROSC returned after 5 minutes. Cardiac arrest possibly secondary to severe hypoglycemia from eating less and not tracking blood sugar carefully. Hospital course complicated by 1) prolonged hypoxemic respiratory failure. Difficult extubation requiring s/p trach and peg 2) left upper extremity/left subclavian DVT and placed on Eliquis 3) multiple infections (Enterococcal faecalis cellulitis, infected left hand hematoma, tracheobronchitis secondary to citrobacter 4) tonic clonic seizure - on Keppra / Depakote - seen by NEUROLOGY 5) fever of 102 on 1/19.     # Acute/Chronic Respiratory failure w/ Hypoxia and Hypercapnia/ COPD, now trach removed.   - S/p intubation; failed extubation requiring s/p trach and peg. Trach now removed.    - cont w/ Symbicort, Duonebs prn  - Frequent suction, PEG feeds now on hold, was advanced to puree diet. Tolerates well    Hypokalemia  - replace w/ KCl    # Myoclonic Seizure   - EEG: Myoclonic seizure, Neurology on board, cont w/ Keppra and Depakote.    # S/p Cardiac Arrest, acute metabolic encephalopathy due to severe hypoglycemia.  - s/p ACLS w/ ROSC after 5 min   - 2nd degree heart block, s/p PPM in place.  - ECHO Takotsubo cardiomyopathy. EF 50-55%, LVH, mild pHTN    - status post ICU course; Hemodynamically stable now.   - cont w/ Metoprolol, and atorvastatin.               # Occlusive Left subclavian thrombosis   - LUE venous duplex : occlusive thrombus in the left mid subclavian vein with partial slow flow detected in the lateral subclavian vein.  - LUE arterial Duplex neg, cont w/ Eliquis     # DM2 : A1c 8.8%, resume Lantus 10 units per day, glucose improved to 132. Cont w/ FS monitoring w/ insulin s/s coverage     # HTN, Cad s/p PCI w/ stent/ CABG and HPL.  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 Tube feeding on hold, puree diet. Added free water flush 250 ml tid for mild hypernatremia at 146.  Check CBC, SMA-7 weekly on Wednesday.      Stable for discharge to rehab when bed available.    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 ACLS for PEA arrest and ROSC returned after 5 minutes. Cardiac arrest possibly secondary to severe hypoglycemia from eating less and not tracking blood sugar carefully. Hospital course complicated by 1) prolonged hypoxemic respiratory failure. Difficult extubation requiring s/p trach and peg 2) left upper extremity/left subclavian DVT and placed on Eliquis 3) multiple infections (Enterococcal faecalis cellulitis, infected left hand hematoma, tracheobronchitis secondary to citrobacter 4) tonic clonic seizure - on Keppra / Depakote - seen by NEUROLOGY 5) fever of 102 on 1/19.     # Acute/Chronic Respiratory failure w/ Hypoxia and Hypercapnia/ COPD, now trach removed.   - S/p intubation; failed extubation requiring s/p trach and peg. Trach now removed.    - cont w/ Symbicort, Duonebs prn  - Frequent suction, PEG feeds now on hold, was advanced to puree diet. Tolerates well    Hypophoshatemia  - replace w/ Phos    # Myoclonic Seizure   - EEG: Myoclonic seizure, Neurology on board, cont w/ Keppra and Depakote.    # S/p Cardiac Arrest, acute metabolic encephalopathy due to severe hypoglycemia.  - s/p ACLS w/ ROSC after 5 min   - 2nd degree heart block, s/p PPM in place.  - ECHO Takotsubo cardiomyopathy. EF 50-55%, LVH, mild pHTN    - status post ICU course; Hemodynamically stable now.   - cont w/ Metoprolol, and atorvastatin.               # Occlusive Left subclavian thrombosis   - LUE venous duplex : occlusive thrombus in the left mid subclavian vein with partial slow flow detected in the lateral subclavian vein.  - LUE arterial Duplex neg, cont w/ Eliquis     # DM2 : A1c 8.8%, resume Lantus 10 units per day, glucose improved to 132. Cont w/ FS monitoring w/ insulin s/s coverage     # HTN, Cad s/p PCI w/ stent/ CABG and HPL.  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 Tube feeding on hold, puree diet. Added free water flush 250 ml tid for mild hypernatremia at 146.  Check CBC, SMA-7 weekly on Wednesday.      Stable for discharge to rehab when bed available.    71 yo male w/ history of COPD, CAD sp PCI w/ stent, CABG 2014, HF w/ PeF, 2nd degree heart block, s/p PPM, HTN, DM, CKD Stage 3, CVA- lacunar infarcts admitted to ICU originally after he was brought to the ED for unresponsiveness and had a cardiac arrest in the ED on 11/14. ACLS for PEA arrest and ROSC returned after 5 minutes. Cardiac arrest possibly secondary to severe hypoglycemia from eating less and not tracking blood sugar carefully. Pt had tonic clonic seizures shortly post arrest, likely due to hypoglycemia vs anoxic encephalopathy. Hospital course complicated by prolonged hypoxemic respiratory failure. Difficult extubation requiring s/p trach and peg on 12/5, ATN & shock liver on admission, left upper extremity/left subclavian DVT on 12/11 and placed on Eliquis, multiple infections (Enterococcal faecalis cellulitis on 12/12, infected left hand hematoma status post I&D by plastics on 12/13, tracheobronchitis secondary to citrobacter and fever of 102 on 1/19.      Acute/Chronic Respiratory failure w/ Hypoxia and Hypercapnia/ COPD, now trach removed  - s/p intubation on admission; failed extubation requiring trach and peg - Trach now removed 3/7  - c/w Symbicort & PRN Duoneb   - Frequent suction, PEG feeds now on hold, was advanced to puree diet. Tolerates well  - patient could not tolerate CPAP for AUDI and it was stopped on 3/7/2023 as per pulm    Hypophosphatemia  - replace w/ Phos    Myoclonic Seizure   - EEG: Myoclonic seizure  - Neurology was on board  - c/w Keppra and Depakote     S/p Cardiac Arrest, acute metabolic encephalopathy due to severe hypoglycemia  - s/p ACLS w/ ROSC after 5 min   - 2nd degree heart block, s/p PPM in place  - ECHO Takotsubo cardiomyopathy. EF 50-55%, LVH, mild pHTN    - status post ICU course; Hemodynamically stable now  - c/w Metoprolol and atorvastatin          - will resume Entresto on d/c    Occlusive Left subclavian thrombosis   - LUE venous duplex showed occlusive thrombus in the left mid subclavian vein with partial slow flow detected in the lateral subclavian vein  - LUE arterial Duplex neg  - c/w Eliquis     DM2  - Hgb A1C is 5.9%  - c/w Lantus & ISS     CAD s/p PCI w/ stent/ CABG    - c/w ASA and Atorvastatin     HLD  - c/w Atorvastatin    HTN  - c/w metoprolol    Stage 3-4 sacral decubiti wound  - s/p debridement 12/19  - c/w wound care as recommended     Small left lower lobe nodules, measuring up to 1 cm seen on CT on admission in November  - will need CT is 6-12 months - pt's family made aware of this     Dysphagia/ Moderate Protein Calorie Malnutrition  - Peg tube in place. Tube feeding on hold, puree diet. Added free water flush 250 ml tid for mild hypernatremia at 146    Constipation  - c/w Senna and Miralax    Normocytic anemia   - iron panel c/w Anemia of Chronic Disease  - low normal Vit B12 and folic acid - patient is on B12 supplement     Prophylaxis:  DVT: Eliquis  GI: Protonix

## 2023-03-17 NOTE — PROGRESS NOTE ADULT - NUTRITIONAL ASSESSMENT
This patient has been assessed with a concern for Malnutrition and has been determined to have a diagnosis/diagnoses of Moderate protein-calorie malnutrition.    This patient is being managed with:   Diet NPO with Tube Feed-  Tube Feeding Modality: Gastrostomy  Glucerna 1.2 Pedrito  Total Volume for 24 Hours (mL): 1440  Continuous  Until Goal Tube Feed Rate (mL per Hour): 60  Tube Feed Duration (in Hours): 24  Tube Feed Start Time: 12:15  Free Water Flush Instructions:  automatic water flus via pump @ 20 ml/hr  Entered: Dec 23 2022 12:08PM    
This patient has been assessed with a concern for Malnutrition and has been determined to have a diagnosis/diagnoses of Moderate protein-calorie malnutrition.    This patient is being managed with:   Diet NPO with Tube Feed-  Tube Feeding Modality: Gastrostomy  Glucerna 1.2 Pedrito  Total Volume for 24 Hours (mL): 1440  Continuous  Until Goal Tube Feed Rate (mL per Hour): 60  Tube Feed Duration (in Hours): 24  Tube Feed Start Time: 14:30  Free Water Flush  Free Water Flush Instructions:  30 ml/hr via pump  Liquid Protein Supplement     Qty per Day:  1  Entered: Jan 9 2023  1:44PM    
This patient has been assessed with a concern for Malnutrition and has been determined to have a diagnosis/diagnoses of Moderate protein-calorie malnutrition.    This patient is being managed with:   Diet Pureed-  Consistent Carbohydrate {Evening Snack}  Low Sodium  Lacto-Ovo Veg (Accepts Milk Prod. Eggs)  Supplement Feeding Modality:  Oral  Glucerna Shake Cans or Servings Per Day:  1       Frequency:  Three Times a day  Entered: Mar 13 2023 12:08PM    
This patient has been assessed with a concern for Malnutrition and has been determined to have a diagnosis/diagnoses of Moderate protein-calorie malnutrition.    This patient is being managed with:   Diet NPO with Tube Feed-  Tube Feeding Modality: Gastrostomy  Glucerna 1.2 Pedrito  Total Volume for 24 Hours (mL): 1440  Continuous  Until Goal Tube Feed Rate (mL per Hour): 60  Tube Feed Duration (in Hours): 24  Tube Feed Start Time: 12:15  Free Water Flush Instructions:  automatic water flus via pump @ 20 ml/hr  Entered: Dec 23 2022 12:08PM    
This patient has been assessed with a concern for Malnutrition and has been determined to have a diagnosis/diagnoses of Moderate protein-calorie malnutrition.    This patient is being managed with:   Diet NPO with Tube Feed-  Tube Feeding Modality: Gastrostomy  Glucerna 1.2 Pedrito  Total Volume for 24 Hours (mL): 1440  Continuous  Until Goal Tube Feed Rate (mL per Hour): 60  Tube Feed Duration (in Hours): 24  Tube Feed Start Time: 12:15  Free Water Flush Instructions:  automatic water flus via pump @ 20 ml/hr  Entered: Dec 23 2022 12:08PM    
This patient has been assessed with a concern for Malnutrition and has been determined to have a diagnosis/diagnoses of Moderate protein-calorie malnutrition.    This patient is being managed with:   Diet NPO with Tube Feed-  Tube Feeding Modality: Gastrostomy  Glucerna 1.2 Pedrito  Total Volume for 24 Hours (mL): 1440  Continuous  Until Goal Tube Feed Rate (mL per Hour): 60  Tube Feed Duration (in Hours): 24  Tube Feed Start Time: 14:30  Free Water Flush  Free Water Flush Instructions:  30 ml/hr via pump  Liquid Protein Supplement     Qty per Day:  1  Entered: Jan 9 2023  1:44PM    
This patient has been assessed with a concern for Malnutrition and has been determined to have a diagnosis/diagnoses of Moderate protein-calorie malnutrition.    This patient is being managed with:   Diet Pureed-  Consistent Carbohydrate {Evening Snack}  Low Sodium  Lacto-Ovo Veg (Accepts Milk Prod. Eggs)  Tube Feeding Modality: Gastrostomy  Glucerna 1.2 Pedrito  Total Volume for 24 Hours (mL): 720  Intermittent  Until Goal Tube Feed Rate (mL per Hour): 60  Tube Feeding Hours ON: 12  Tube Feeding OFF (Hours): 12  Tube Feed Start Time: 19:00  Liquid Protein Supplement     Qty per Day:  1  Supplement Feeding Modality:  Oral  Glucerna Shake Cans or Servings Per Day:  1       Frequency:  Two Times a day  Entered: Mar  1 2023 12:14PM    
This patient has been assessed with a concern for Malnutrition and has been determined to have a diagnosis/diagnoses of Moderate protein-calorie malnutrition.    This patient is being managed with:   Diet Pureed-  Consistent Carbohydrate {Evening Snack}  Low Sodium  Lacto-Ovo Veg (Accepts Milk Prod. Eggs)  Tube Feeding Modality: Gastrostomy  Glucerna 1.2 Pedrito  Total Volume for 24 Hours (mL): 720  Intermittent  Until Goal Tube Feed Rate (mL per Hour): 60  Tube Feeding Hours ON: 12  Tube Feeding OFF (Hours): 12  Tube Feed Start Time: 19:00  Liquid Protein Supplement     Qty per Day:  1  Supplement Feeding Modality:  Oral  Glucerna Shake Cans or Servings Per Day:  1       Frequency:  Two Times a day  Entered: Mar  1 2023 12:14PM    
This patient has been assessed with a concern for Malnutrition and has been determined to have a diagnosis/diagnoses of Moderate protein-calorie malnutrition.    This patient is being managed with:   Diet NPO with Tube Feed-  Tube Feeding Modality: Gastrostomy  Glucerna 1.2 Pedrito  Total Volume for 24 Hours (mL): 1440  Continuous  Until Goal Tube Feed Rate (mL per Hour): 60  Tube Feed Duration (in Hours): 24  Tube Feed Start Time: 12:15  Free Water Flush Instructions:  automatic water flus via pump @ 20 ml/hr  Entered: Dec 23 2022 12:08PM    
This patient has been assessed with a concern for Malnutrition and has been determined to have a diagnosis/diagnoses of Moderate protein-calorie malnutrition.    This patient is being managed with:   Diet NPO with Tube Feed-  Tube Feeding Modality: Gastrostomy  Glucerna 1.2 Pedrito  Total Volume for 24 Hours (mL): 1440  Continuous  Until Goal Tube Feed Rate (mL per Hour): 60  Tube Feed Duration (in Hours): 24  Tube Feed Start Time: 14:30  Free Water Flush  Free Water Flush Instructions:  30 ml/hr via pump  Liquid Protein Supplement     Qty per Day:  1  Entered: Jan 9 2023  1:44PM    
This patient has been assessed with a concern for Malnutrition and has been determined to have a diagnosis/diagnoses of Moderate protein-calorie malnutrition.    This patient is being managed with:   Diet Pureed-  Consistent Carbohydrate {Evening Snack}  Low Sodium  Lacto-Ovo Veg (Accepts Milk Prod. Eggs)  Tube Feeding Modality: Gastrostomy  Glucerna 1.2 Pedrito  Total Volume for 24 Hours (mL): 720  Intermittent  Until Goal Tube Feed Rate (mL per Hour): 60  Tube Feeding Hours ON: 12  Tube Feeding OFF (Hours): 12  Tube Feed Start Time: 19:00  Liquid Protein Supplement     Qty per Day:  1  Supplement Feeding Modality:  Oral  Glucerna Shake Cans or Servings Per Day:  1       Frequency:  Two Times a day  Entered: Mar  1 2023 12:14PM    
This patient has been assessed with a concern for Malnutrition and has been determined to have a diagnosis/diagnoses of Moderate protein-calorie malnutrition.    This patient is being managed with:   Diet NPO with Tube Feed-  Tube Feeding Modality: Gastrostomy  Glucerna 1.2 Pedrito  Total Volume for 24 Hours (mL): 1440  Continuous  Until Goal Tube Feed Rate (mL per Hour): 60  Tube Feed Duration (in Hours): 24  Tube Feed Start Time: 12:15  Free Water Flush Instructions:  automatic water flus via pump @ 20 ml/hr  Entered: Dec 23 2022 12:08PM    
This patient has been assessed with a concern for Malnutrition and has been determined to have a diagnosis/diagnoses of Moderate protein-calorie malnutrition.    This patient is being managed with:   Diet NPO with Tube Feed-  Tube Feeding Modality: Gastrostomy  Glucerna 1.2 Pedrito  Total Volume for 24 Hours (mL): 1440  Continuous  Until Goal Tube Feed Rate (mL per Hour): 60  Tube Feed Duration (in Hours): 24  Tube Feed Start Time: 12:15  Free Water Flush Instructions:  automatic water flus via pump @ 20 ml/hr  Entered: Dec 23 2022 12:08PM    
This patient has been assessed with a concern for Malnutrition and has been determined to have a diagnosis/diagnoses of Moderate protein-calorie malnutrition.    This patient is being managed with:   Diet NPO with Tube Feed-  Tube Feeding Modality: Gastrostomy  Glucerna 1.2 Pedrito  Total Volume for 24 Hours (mL): 1440  Continuous  Until Goal Tube Feed Rate (mL per Hour): 60  Tube Feed Duration (in Hours): 24  Tube Feed Start Time: 14:30  Free Water Flush  Free Water Flush Instructions:  30 ml/hr via pump  Liquid Protein Supplement     Qty per Day:  1  Entered: Jan 9 2023  1:44PM    
This patient has been assessed with a concern for Malnutrition and has been determined to have a diagnosis/diagnoses of Moderate protein-calorie malnutrition.    This patient is being managed with:   Diet NPO with Tube Feed-  Tube Feeding Modality: Gastrostomy  Glucerna 1.2 Pedrito  Total Volume for 24 Hours (mL): 1440  Continuous  Until Goal Tube Feed Rate (mL per Hour): 60  Tube Feed Duration (in Hours): 24  Tube Feed Start Time: 14:30  Free Water Flush  Free Water Flush Instructions:  30 ml/hr via pump  Liquid Protein Supplement     Qty per Day:  1  Entered: Jan 9 2023  1:44PM      This patient has been assessed with a concern for Malnutrition and has been determined to have a diagnosis/diagnoses of Moderate protein-calorie malnutrition.    This patient is being managed with:   Diet NPO with Tube Feed-  Tube Feeding Modality: Gastrostomy  Glucerna 1.2 Pedrito  Total Volume for 24 Hours (mL): 1440  Continuous  Until Goal Tube Feed Rate (mL per Hour): 60  Tube Feed Duration (in Hours): 24  Tube Feed Start Time: 14:30  Free Water Flush  Free Water Flush Instructions:  30 ml/hr via pump  Liquid Protein Supplement     Qty per Day:  1  Entered: Jan 9 2023  1:44PM    
This patient has been assessed with a concern for Malnutrition and has been determined to have a diagnosis/diagnoses of Moderate protein-calorie malnutrition.    This patient is being managed with:   Diet NPO with Tube Feed-  Tube Feeding Modality: Gastrostomy  Glucerna 1.2 Pedrito  Total Volume for 24 Hours (mL): 1440  Continuous  Until Goal Tube Feed Rate (mL per Hour): 60  Tube Feed Duration (in Hours): 24  Tube Feed Start Time: 14:30  Free Water Flush  Free Water Flush Instructions:  30 ml/hr via pump  Liquid Protein Supplement     Qty per Day:  1  Entered: Jan 9 2023  1:44PM      This patient has been assessed with a concern for Malnutrition and has been determined to have a diagnosis/diagnoses of Moderate protein-calorie malnutrition.    This patient is being managed with:   Diet NPO with Tube Feed-  Tube Feeding Modality: Gastrostomy  Glucerna 1.2 Pedrito  Total Volume for 24 Hours (mL): 1440  Continuous  Until Goal Tube Feed Rate (mL per Hour): 60  Tube Feed Duration (in Hours): 24  Tube Feed Start Time: 14:30  Free Water Flush  Free Water Flush Instructions:  30 ml/hr via pump  Liquid Protein Supplement     Qty per Day:  1  Entered: Jan 9 2023  1:44PM      This patient has been assessed with a concern for Malnutrition and has been determined to have a diagnosis/diagnoses of Moderate protein-calorie malnutrition.    This patient is being managed with:   Diet NPO with Tube Feed-  Tube Feeding Modality: Gastrostomy  Glucerna 1.2 Pedrito  Total Volume for 24 Hours (mL): 1440  Continuous  Until Goal Tube Feed Rate (mL per Hour): 60  Tube Feed Duration (in Hours): 24  Tube Feed Start Time: 14:30  Free Water Flush  Free Water Flush Instructions:  30 ml/hr via pump  Liquid Protein Supplement     Qty per Day:  1  Entered: Jan 9 2023  1:44PM    
This patient has been assessed with a concern for Malnutrition and has been determined to have a diagnosis/diagnoses of Moderate protein-calorie malnutrition.    This patient is being managed with:   Diet Pureed-  Consistent Carbohydrate {Evening Snack}  Low Sodium  Lacto-Ovo Veg (Accepts Milk Prod. Eggs)  Supplement Feeding Modality:  Oral  Glucerna Shake Cans or Servings Per Day:  1       Frequency:  Three Times a day  Entered: Mar 13 2023 12:08PM    
This patient has been assessed with a concern for Malnutrition and has been determined to have a diagnosis/diagnoses of Moderate protein-calorie malnutrition.    This patient is being managed with:   Diet Pureed-  Consistent Carbohydrate {Evening Snack}  Low Sodium  Lacto-Ovo Veg (Accepts Milk Prod. Eggs)  Tube Feeding Modality: Gastrostomy  Glucerna 1.2 Pedrito  Total Volume for 24 Hours (mL): 720  Intermittent  Until Goal Tube Feed Rate (mL per Hour): 60  Tube Feeding Hours ON: 12  Tube Feeding OFF (Hours): 12  Tube Feed Start Time: 19:00  Liquid Protein Supplement     Qty per Day:  1  Supplement Feeding Modality:  Oral  Glucerna Shake Cans or Servings Per Day:  1       Frequency:  Two Times a day  Entered: Mar  1 2023 12:14PM    
This patient has been assessed with a concern for Malnutrition and has been determined to have a diagnosis/diagnoses of Moderate protein-calorie malnutrition.    This patient is being managed with:   Diet Pureed-  Tube Feeding Modality: Gastrostomy  Glucerna 1.2 Pedrito  Total Volume for 24 Hours (mL): 1440  Continuous  Starting Tube Feed Rate {mL per Hour}: 60  Until Goal Tube Feed Rate (mL per Hour): 60  Tube Feed Duration (in Hours): 24  Tube Feed Start Time: 15:30  Free Water Flush  Pump   Rate (mL per Hour): 30  Liquid Protein Supplement     Qty per Day:  1  Entered: Feb 27 2023  3:20PM    
This patient has been assessed with a concern for Malnutrition and has been determined to have a diagnosis/diagnoses of Moderate protein-calorie malnutrition.    This patient is being managed with:   Diet NPO with Tube Feed-  Tube Feeding Modality: Gastrostomy  Glucerna 1.2 Pedrito  Total Volume for 24 Hours (mL): 1440  Continuous  Until Goal Tube Feed Rate (mL per Hour): 60  Tube Feed Duration (in Hours): 24  Tube Feed Start Time: 12:15  Free Water Flush Instructions:  automatic water flus via pump @ 20 ml/hr  Entered: Dec 23 2022 12:08PM    
This patient has been assessed with a concern for Malnutrition and has been determined to have a diagnosis/diagnoses of Moderate protein-calorie malnutrition.    This patient is being managed with:   Diet NPO with Tube Feed-  Tube Feeding Modality: Gastrostomy  Glucerna 1.2 Pedrito  Total Volume for 24 Hours (mL): 1440  Continuous  Until Goal Tube Feed Rate (mL per Hour): 60  Tube Feed Duration (in Hours): 24  Tube Feed Start Time: 14:30  Free Water Flush  Free Water Flush Instructions:  30 ml/hr via pump  Liquid Protein Supplement     Qty per Day:  1  Entered: Jan 9 2023  1:44PM    
This patient has been assessed with a concern for Malnutrition and has been determined to have a diagnosis/diagnoses of Moderate protein-calorie malnutrition.    This patient is being managed with:   Diet Pureed-  Consistent Carbohydrate {Evening Snack}  Low Sodium  Lacto-Ovo Veg (Accepts Milk Prod. Eggs)  Supplement Feeding Modality:  Oral  Glucerna Shake Cans or Servings Per Day:  1       Frequency:  Three Times a day  Entered: Mar 13 2023 12:08PM    
This patient has been assessed with a concern for Malnutrition and has been determined to have a diagnosis/diagnoses of Moderate protein-calorie malnutrition.    This patient is being managed with:   Diet Pureed-  Consistent Carbohydrate {Evening Snack}  Low Sodium  Lacto-Ovo Veg (Accepts Milk Prod. Eggs)  Supplement Feeding Modality:  Oral  Glucerna Shake Cans or Servings Per Day:  1       Frequency:  Three Times a day  Entered: Mar 13 2023 12:08PM    
This patient has been assessed with a concern for Malnutrition and has been determined to have a diagnosis/diagnoses of Moderate protein-calorie malnutrition.    This patient is being managed with:   Diet Pureed-  Consistent Carbohydrate {Evening Snack}  Low Sodium  Lacto-Ovo Veg (Accepts Milk Prod. Eggs)  Tube Feeding Modality: Gastrostomy  Glucerna 1.2 Pedrito  Total Volume for 24 Hours (mL): 720  Intermittent  Until Goal Tube Feed Rate (mL per Hour): 60  Tube Feeding Hours ON: 12  Tube Feeding OFF (Hours): 12  Tube Feed Start Time: 19:00  Liquid Protein Supplement     Qty per Day:  1  Supplement Feeding Modality:  Oral  Glucerna Shake Cans or Servings Per Day:  1       Frequency:  Two Times a day  Entered: Mar  1 2023 12:14PM    
This patient has been assessed with a concern for Malnutrition and has been determined to have a diagnosis/diagnoses of Moderate protein-calorie malnutrition.    This patient is being managed with:   Diet NPO with Tube Feed-  Tube Feeding Modality: Gastrostomy  Glucerna 1.2 Pedrito  Total Volume for 24 Hours (mL): 1440  Continuous  Until Goal Tube Feed Rate (mL per Hour): 60  Tube Feed Duration (in Hours): 24  Tube Feed Start Time: 12:15  Free Water Flush Instructions:  automatic water flus via pump @ 20 ml/hr  Entered: Dec 23 2022 12:08PM    
This patient has been assessed with a concern for Malnutrition and has been determined to have a diagnosis/diagnoses of Moderate protein-calorie malnutrition.    This patient is being managed with:   Diet NPO with Tube Feed-  Tube Feeding Modality: Gastrostomy  Glucerna 1.2 Pedrito  Total Volume for 24 Hours (mL): 1440  Continuous  Until Goal Tube Feed Rate (mL per Hour): 60  Tube Feed Duration (in Hours): 24  Tube Feed Start Time: 14:30  Free Water Flush  Free Water Flush Instructions:  30 ml/hr via pump  Liquid Protein Supplement     Qty per Day:  1  Entered: Jan 9 2023  1:44PM    

## 2023-03-17 NOTE — PROGRESS NOTE ADULT - SUBJECTIVE AND OBJECTIVE BOX
BRANDON MÉNDEZL    [facility] [address]    Allergies    No Known Allergies    Intolerances        PAST MEDICAL & SURGICAL HISTORY:  HTN (hypertension)      HLD (hyperlipidemia)      Diabetes mellitus      Lacunar infarction      BPH (benign prostatic hyperplasia)      CHF (congestive heart failure)      Chronic obstructive pulmonary disease, unspecified COPD type      S/P CABG (coronary artery bypass graft)  2014          FAMILY HISTORY:      Home Medications:  aspirin 81 mg oral delayed release tablet: 1 tab(s) orally once a day (10 Mar 2023 16:30)  Liquifilm Tears preserved ophthalmic solution: 1 drop(s) to each affected eye 2 times a day (10 Mar 2023 16:30)  tamsulosin 0.4 mg oral capsule: 1 cap(s) orally once a day (14 Mar 2023 16:07)      MEDICATIONS  (STANDING):  apixaban 5 milliGRAM(s) Oral every 12 hours  aspirin  chewable 81 milliGRAM(s) Oral daily  atorvastatin 20 milliGRAM(s) Oral at bedtime  bacitracin   Ointment 1 Application(s) Topical two times a day  budesonide 160 MICROgram(s)/formoterol 4.5 MICROgram(s) Inhaler 2 Puff(s) Inhalation two times a day  chlorhexidine 2% Cloths 1 Application(s) Topical <User Schedule>  cyanocobalamin 1000 MICROGram(s) Oral daily  dextrose 5%. 1000 milliLiter(s) (50 mL/Hr) IV Continuous <Continuous>  dextrose 5%. 1000 milliLiter(s) (100 mL/Hr) IV Continuous <Continuous>  dextrose 50% Injectable 25 Gram(s) IV Push once  dextrose 50% Injectable 12.5 Gram(s) IV Push once  dextrose 50% Injectable 25 Gram(s) IV Push once  glucagon  Injectable 1 milliGRAM(s) IntraMuscular once  insulin glargine Injectable (LANTUS) 10 Unit(s) SubCutaneous at bedtime  insulin lispro (ADMELOG) corrective regimen sliding scale   SubCutaneous three times a day before meals  insulin lispro (ADMELOG) corrective regimen sliding scale   SubCutaneous at bedtime  levETIRAcetam  Solution 1500 milliGRAM(s) Oral two times a day  metoprolol tartrate 25 milliGRAM(s) Oral two times a day  multivitamin/minerals/iron Oral Solution (CENTRUM) 15 milliLiter(s) Enteral Tube daily  pantoprazole   Suspension 40 milliGRAM(s) Oral before breakfast  polyethylene glycol 3350 17 Gram(s) Oral daily  senna 2 Tablet(s) Oral at bedtime  valproic  acid Syrup 1000 milliGRAM(s) Oral <User Schedule>  valproic  acid Syrup 750 milliGRAM(s) Oral <User Schedule>  vitamin A &amp; D Ointment 1 Application(s) Topical two times a day    MEDICATIONS  (PRN):  acetaminophen     Tablet .. 650 milliGRAM(s) Oral every 6 hours PRN Temp greater or equal to 38C (100.4F), Mild Pain (1 - 3)  albuterol/ipratropium for Nebulization 3 milliLiter(s) Nebulizer every 6 hours PRN Shortness of Breath and/or Wheezing  dextrose Oral Gel 15 Gram(s) Oral once PRN Blood Glucose LESS THAN 70 milliGRAM(s)/deciliter  melatonin 3 milliGRAM(s) Oral at bedtime PRN Insomnia      Diet, Pureed:   Consistent Carbohydrate Evening Snack  Low Sodium  Lacto-Ovo Veg (Accepts Milk Prod., Eggs)  Supplement Feeding Modality:  Oral  Glucerna Shake Cans or Servings Per Day:  1       Frequency:  Three Times a day (03-13-23 @ 12:08) [Active]          Vital Signs Last 24 Hrs  T(C): 36.4 (17 Mar 2023 05:16), Max: 37 (16 Mar 2023 10:54)  T(F): 97.6 (17 Mar 2023 05:16), Max: 98.6 (16 Mar 2023 10:54)  HR: 67 (17 Mar 2023 05:16) (67 - 85)  BP: 160/83 (17 Mar 2023 05:16) (109/70 - 160/83)  BP(mean): --  RR: 18 (17 Mar 2023 05:16) (18 - 18)  SpO2: 99% (17 Mar 2023 05:16) (95% - 99%)    Parameters below as of 17 Mar 2023 05:16  Patient On (Oxygen Delivery Method): room air          03-16-23 @ 07:01  -  03-17-23 @ 07:00  --------------------------------------------------------  IN: 600 mL / OUT: 120 mL / NET: 480 mL              LABS:                        12.0   8.70  )-----------( 304      ( 16 Mar 2023 07:38 )             37.1     03-16    140  |  106  |  12  ----------------------------<  130<H>  3.2<L>   |  31  |  0.80    Ca    8.1<L>      16 Mar 2023 07:38  Phos  2.6     03-16  Mg     2.0     03-16    TPro  5.8<L>  /  Alb  2.1<L>  /  TBili  0.2  /  DBili  x   /  AST  30  /  ALT  23  /  AlkPhos  54  03-16              WBC:  WBC Count: 8.70 K/uL (03-16 @ 07:38)      MICROBIOLOGY:  RECENT CULTURES:                  Sodium:  Sodium, Serum: 140 mmol/L (03-16 @ 07:38)      0.80 mg/dL 03-16 @ 07:38      Hemoglobin:  Hemoglobin: 12.0 g/dL (03-16 @ 07:38)      Platelets: Platelet Count - Automated: 304 K/uL (03-16 @ 07:38)      LIVER FUNCTIONS - ( 16 Mar 2023 07:38 )  Alb: 2.1 g/dL / Pro: 5.8 gm/dL / ALK PHOS: 54 U/L / ALT: 23 U/L / AST: 30 U/L / GGT: x                 RADIOLOGY & ADDITIONAL STUDIES:      MICROBIOLOGY:  RECENT CULTURES:

## 2023-03-18 ENCOUNTER — TRANSCRIPTION ENCOUNTER (OUTPATIENT)
Age: 73
End: 2023-03-18

## 2023-03-18 VITALS
SYSTOLIC BLOOD PRESSURE: 125 MMHG | HEART RATE: 96 BPM | DIASTOLIC BLOOD PRESSURE: 99 MMHG | TEMPERATURE: 99 F | RESPIRATION RATE: 17 BRPM | OXYGEN SATURATION: 96 %

## 2023-03-18 LAB
FLUAV AG NPH QL: SIGNIFICANT CHANGE UP
FLUBV AG NPH QL: SIGNIFICANT CHANGE UP
GLUCOSE BLDC GLUCOMTR-MCNC: 127 MG/DL — HIGH (ref 70–99)
GLUCOSE BLDC GLUCOMTR-MCNC: 129 MG/DL — HIGH (ref 70–99)
SARS-COV-2 RNA SPEC QL NAA+PROBE: SIGNIFICANT CHANGE UP

## 2023-03-18 PROCEDURE — 99239 HOSP IP/OBS DSCHRG MGMT >30: CPT

## 2023-03-18 PROCEDURE — 99232 SBSQ HOSP IP/OBS MODERATE 35: CPT

## 2023-03-18 RX ORDER — LEVETIRACETAM 250 MG/1
15 TABLET, FILM COATED ORAL
Qty: 0 | Refills: 0 | DISCHARGE
Start: 2023-03-18

## 2023-03-18 RX ORDER — VALPROIC ACID (AS SODIUM SALT) 250 MG/5ML
750 SOLUTION, ORAL ORAL
Qty: 0 | Refills: 0 | DISCHARGE
Start: 2023-03-18

## 2023-03-18 RX ORDER — MULTIVIT-MIN/FERROUS GLUCONATE 9 MG/15 ML
1 LIQUID (ML) ORAL
Qty: 0 | Refills: 0 | DISCHARGE
Start: 2023-03-18

## 2023-03-18 RX ORDER — APIXABAN 2.5 MG/1
1 TABLET, FILM COATED ORAL
Qty: 0 | Refills: 0 | DISCHARGE
Start: 2023-03-18

## 2023-03-18 RX ORDER — PREGABALIN 225 MG/1
1 CAPSULE ORAL
Qty: 0 | Refills: 0 | DISCHARGE
Start: 2023-03-18

## 2023-03-18 RX ORDER — METOPROLOL TARTRATE 50 MG
1 TABLET ORAL
Qty: 0 | Refills: 0 | DISCHARGE
Start: 2023-03-18

## 2023-03-18 RX ORDER — INSULIN GLARGINE 100 [IU]/ML
10 INJECTION, SOLUTION SUBCUTANEOUS
Qty: 0 | Refills: 0 | DISCHARGE
Start: 2023-03-18

## 2023-03-18 RX ADMIN — BUDESONIDE AND FORMOTEROL FUMARATE DIHYDRATE 2 PUFF(S): 160; 4.5 AEROSOL RESPIRATORY (INHALATION) at 06:01

## 2023-03-18 RX ADMIN — Medication 1 APPLICATION(S): at 06:01

## 2023-03-18 RX ADMIN — LEVETIRACETAM 1500 MILLIGRAM(S): 250 TABLET, FILM COATED ORAL at 06:00

## 2023-03-18 RX ADMIN — PANTOPRAZOLE SODIUM 40 MILLIGRAM(S): 20 TABLET, DELAYED RELEASE ORAL at 06:01

## 2023-03-18 RX ADMIN — CHLORHEXIDINE GLUCONATE 1 APPLICATION(S): 213 SOLUTION TOPICAL at 06:00

## 2023-03-18 RX ADMIN — PREGABALIN 1000 MICROGRAM(S): 225 CAPSULE ORAL at 11:53

## 2023-03-18 RX ADMIN — Medication 750 MILLIGRAM(S): at 08:41

## 2023-03-18 RX ADMIN — Medication 1 APPLICATION(S): at 05:59

## 2023-03-18 RX ADMIN — APIXABAN 5 MILLIGRAM(S): 2.5 TABLET, FILM COATED ORAL at 06:00

## 2023-03-18 RX ADMIN — Medication 81 MILLIGRAM(S): at 11:53

## 2023-03-18 RX ADMIN — Medication 2 PACKET(S): at 05:59

## 2023-03-18 RX ADMIN — Medication 15 MILLILITER(S): at 12:32

## 2023-03-18 RX ADMIN — Medication 2 PACKET(S): at 11:54

## 2023-03-18 NOTE — DISCHARGE NOTE NURSING/CASE MANAGEMENT/SOCIAL WORK - NSDCPEFALRISK_GEN_ALL_CORE
For information on Fall & Injury Prevention, visit: [email or web address] OR  [email or web address] OR  https://www.cdc.gov/steadi/patient.html

## 2023-03-18 NOTE — PROGRESS NOTE ADULT - PROVIDER SPECIALTY LIST ADULT
Critical Care
Hospitalist
Infectious Disease
Infectious Disease
Internal Medicine
Neurology
Pulmonology
Surgery
Surgery
Critical Care
Hospitalist
Infectious Disease
Internal Medicine
Neurology
Neurology
Pulmonology
Surgery
Surgery
Wound Care
Critical Care
Hospitalist
Infectious Disease
Internal Medicine
Neurology
Neurology
Pulmonology
Vascular Surgery
Vascular Surgery
Critical Care
Hospitalist
Infectious Disease
Pulmonology
Surgery
Vascular Surgery
Critical Care
Critical Care
Hospitalist
Infectious Disease
Internal Medicine
Pulmonology
Critical Care
Critical Care
Hospitalist
Infectious Disease
Hospitalist
Internal Medicine
Hospitalist

## 2023-03-18 NOTE — DISCHARGE NOTE NURSING/CASE MANAGEMENT/SOCIAL WORK - PATIENT PORTAL LINK FT
You can access the FollowMyHealth Patient Portal offered by [facility] by registering at the following website: [email or web address]. By joining [employer]’s FollowMyHealth portal, you will also be able to view your health information using other applications (apps) compatible with our system.

## 2023-03-18 NOTE — DISCHARGE NOTE NURSING/CASE MANAGEMENT/SOCIAL WORK - NSDCFUADDAPPT_GEN_ALL_CORE_FT
It is important to see your primary physician as well as the physicians noted below within the next week to perform a comprehensive medical review.  Call their offices for an appointment as soon as you leave the hospital.  You will also need to see them for renewal of your medications.  If you do not have a primary physician, contact the [facility] Physician Referral Service at [phone].  To obtain your results, you can access the [email or web address] Patient Portal at [email or web address].  Your medical issues appear to be stable at this time, but if your symptoms recur or worsen, contact your physicians and/or return to the hospital if necessary.  If you encounter any issues or questions with your medication, call your physicians before stopping the medication.

## 2023-03-18 NOTE — PROGRESS NOTE ADULT - SUBJECTIVE AND OBJECTIVE BOX
BRANDON MÉNDEZL    [address]    Allergies    No Known Allergies    Intolerances        PAST MEDICAL & SURGICAL HISTORY:  HTN (hypertension)      HLD (hyperlipidemia)      Diabetes mellitus      Lacunar infarction      BPH (benign prostatic hyperplasia)      CHF (congestive heart failure)      Chronic obstructive pulmonary disease, unspecified COPD type      S/P CABG (coronary artery bypass graft)  2014          FAMILY HISTORY:      Home Medications:  aspirin 81 mg oral delayed release tablet: 1 tab(s) orally once a day (10 Mar 2023 16:30)  Liquifilm Tears preserved ophthalmic solution: 1 drop(s) to each affected eye 2 times a day (10 Mar 2023 16:30)  tamsulosin 0.4 mg oral capsule: 1 cap(s) orally once a day (14 Mar 2023 16:07)      MEDICATIONS  (STANDING):  apixaban 5 milliGRAM(s) Oral every 12 hours  aspirin  chewable 81 milliGRAM(s) Oral daily  atorvastatin 20 milliGRAM(s) Oral at bedtime  bacitracin   Ointment 1 Application(s) Topical two times a day  budesonide 160 MICROgram(s)/formoterol 4.5 MICROgram(s) Inhaler 2 Puff(s) Inhalation two times a day  chlorhexidine 2% Cloths 1 Application(s) Topical <User Schedule>  cyanocobalamin 1000 MICROGram(s) Oral daily  dextrose 5%. 1000 milliLiter(s) (100 mL/Hr) IV Continuous <Continuous>  dextrose 5%. 1000 milliLiter(s) (50 mL/Hr) IV Continuous <Continuous>  dextrose 50% Injectable 25 Gram(s) IV Push once  dextrose 50% Injectable 12.5 Gram(s) IV Push once  dextrose 50% Injectable 25 Gram(s) IV Push once  glucagon  Injectable 1 milliGRAM(s) IntraMuscular once  insulin glargine Injectable (LANTUS) 10 Unit(s) SubCutaneous at bedtime  insulin lispro (ADMELOG) corrective regimen sliding scale   SubCutaneous three times a day before meals  insulin lispro (ADMELOG) corrective regimen sliding scale   SubCutaneous at bedtime  levETIRAcetam  Solution 1500 milliGRAM(s) Oral two times a day  metoprolol tartrate 25 milliGRAM(s) Oral two times a day  multivitamin/minerals/iron Oral Solution (CENTRUM) 15 milliLiter(s) Enteral Tube daily  pantoprazole   Suspension 40 milliGRAM(s) Oral before breakfast  polyethylene glycol 3350 17 Gram(s) Oral daily  potassium phosphate / sodium phosphate Powder (PHOS-NaK) 2 Packet(s) Oral four times a day  senna 2 Tablet(s) Oral at bedtime  valproic  acid Syrup 1000 milliGRAM(s) Oral <User Schedule>  valproic  acid Syrup 750 milliGRAM(s) Oral <User Schedule>  vitamin A &amp; D Ointment 1 Application(s) Topical two times a day    MEDICATIONS  (PRN):  acetaminophen     Tablet .. 650 milliGRAM(s) Oral every 6 hours PRN Temp greater or equal to 38C (100.4F), Mild Pain (1 - 3)  albuterol/ipratropium for Nebulization 3 milliLiter(s) Nebulizer every 6 hours PRN Shortness of Breath and/or Wheezing  dextrose Oral Gel 15 Gram(s) Oral once PRN Blood Glucose LESS THAN 70 milliGRAM(s)/deciliter  melatonin 3 milliGRAM(s) Oral at bedtime PRN Insomnia      Diet, Pureed:   Consistent Carbohydrate Evening Snack  Low Sodium  Lacto-Ovo Veg (Accepts Milk Prod., Eggs)  Supplement Feeding Modality:  Oral  Glucerna Shake Cans or Servings Per Day:  1       Frequency:  Three Times a day (03-13-23 @ 12:08) [Active]          Vital Signs Last 24 Hrs  T(C): 37.1 (18 Mar 2023 04:57), Max: 37.1 (18 Mar 2023 04:57)  T(F): 98.8 (18 Mar 2023 04:57), Max: 98.8 (18 Mar 2023 04:57)  HR: 73 (18 Mar 2023 04:57) (73 - 86)  BP: 110/66 (18 Mar 2023 04:57) (110/66 - 166/85)  BP(mean): --  RR: 18 (18 Mar 2023 04:57) (18 - 18)  SpO2: 98% (18 Mar 2023 04:57) (97% - 99%)    Parameters below as of 18 Mar 2023 04:57  Patient On (Oxygen Delivery Method): room air          03-17-23 @ 07:01  -  03-18-23 @ 07:00  --------------------------------------------------------  IN: 60 mL / OUT: 300 mL / NET: -240 mL              LABS:    03-17    143  |  110<H>  |  12  ----------------------------<  200<H>  4.1   |  28  |  0.82    Ca    8.7      17 Mar 2023 12:17  Phos  2.2     03-17  Mg     2.2     03-17                WBC:  WBC Count: 8.70 K/uL (03-16 @ 07:38)      MICROBIOLOGY:  RECENT CULTURES:                  Sodium:  Sodium, Serum: 143 mmol/L (03-17 @ 12:17)  Sodium, Serum: 140 mmol/L (03-16 @ 07:38)      0.82 mg/dL 03-17 @ 12:17  0.80 mg/dL 03-16 @ 07:38      Hemoglobin:  Hemoglobin: 12.0 g/dL (03-16 @ 07:38)      Platelets: Platelet Count - Automated: 304 K/uL (03-16 @ 07:38)              RADIOLOGY & ADDITIONAL STUDIES:      MICROBIOLOGY:  RECENT CULTURES:

## 2023-03-18 NOTE — PROGRESS NOTE ADULT - REASON FOR ADMISSION
s/p cardiac arrest, hypoglycemia

## 2023-03-21 ENCOUNTER — EMERGENCY (EMERGENCY)
Facility: HOSPITAL | Age: 73
LOS: 0 days | Discharge: HOME | End: 2023-03-21
Attending: STUDENT IN AN ORGANIZED HEALTH CARE EDUCATION/TRAINING PROGRAM | Admitting: INTERNAL MEDICINE
Payer: MEDICAID

## 2023-03-21 VITALS
HEART RATE: 100 BPM | OXYGEN SATURATION: 97 % | DIASTOLIC BLOOD PRESSURE: 62 MMHG | TEMPERATURE: 97 F | WEIGHT: 150.36 LBS | SYSTOLIC BLOOD PRESSURE: 89 MMHG | RESPIRATION RATE: 18 BRPM | HEIGHT: 66 IN

## 2023-03-21 VITALS
TEMPERATURE: 98 F | SYSTOLIC BLOOD PRESSURE: 102 MMHG | DIASTOLIC BLOOD PRESSURE: 72 MMHG | RESPIRATION RATE: 16 BRPM | OXYGEN SATURATION: 96 % | HEART RATE: 96 BPM

## 2023-03-21 DIAGNOSIS — E11.22 TYPE 2 DIABETES MELLITUS WITH DIABETIC CHRONIC KIDNEY DISEASE: ICD-10-CM

## 2023-03-21 DIAGNOSIS — N40.0 BENIGN PROSTATIC HYPERPLASIA WITHOUT LOWER URINARY TRACT SYMPTOMS: ICD-10-CM

## 2023-03-21 DIAGNOSIS — J44.9 CHRONIC OBSTRUCTIVE PULMONARY DISEASE, UNSPECIFIED: ICD-10-CM

## 2023-03-21 DIAGNOSIS — I13.0 HYPERTENSIVE HEART AND CHRONIC KIDNEY DISEASE WITH HEART FAILURE AND STAGE 1 THROUGH STAGE 4 CHRONIC KIDNEY DISEASE, OR UNSPECIFIED CHRONIC KIDNEY DISEASE: ICD-10-CM

## 2023-03-21 DIAGNOSIS — Z79.4 LONG TERM (CURRENT) USE OF INSULIN: ICD-10-CM

## 2023-03-21 DIAGNOSIS — E78.5 HYPERLIPIDEMIA, UNSPECIFIED: ICD-10-CM

## 2023-03-21 DIAGNOSIS — Z95.1 PRESENCE OF AORTOCORONARY BYPASS GRAFT: Chronic | ICD-10-CM

## 2023-03-21 DIAGNOSIS — Z86.718 PERSONAL HISTORY OF OTHER VENOUS THROMBOSIS AND EMBOLISM: ICD-10-CM

## 2023-03-21 DIAGNOSIS — N18.30 CHRONIC KIDNEY DISEASE, STAGE 3 UNSPECIFIED: ICD-10-CM

## 2023-03-21 DIAGNOSIS — Z79.82 LONG TERM (CURRENT) USE OF ASPIRIN: ICD-10-CM

## 2023-03-21 DIAGNOSIS — E11.8 TYPE 2 DIABETES MELLITUS WITH UNSPECIFIED COMPLICATIONS: ICD-10-CM

## 2023-03-21 DIAGNOSIS — I95.9 HYPOTENSION, UNSPECIFIED: ICD-10-CM

## 2023-03-21 DIAGNOSIS — I25.10 ATHEROSCLEROTIC HEART DISEASE OF NATIVE CORONARY ARTERY WITHOUT ANGINA PECTORIS: ICD-10-CM

## 2023-03-21 DIAGNOSIS — G40.909 EPILEPSY, UNSPECIFIED, NOT INTRACTABLE, WITHOUT STATUS EPILEPTICUS: ICD-10-CM

## 2023-03-21 DIAGNOSIS — L89.90 PRESSURE ULCER OF UNSPECIFIED SITE, UNSPECIFIED STAGE: ICD-10-CM

## 2023-03-21 DIAGNOSIS — I50.9 HEART FAILURE, UNSPECIFIED: ICD-10-CM

## 2023-03-21 DIAGNOSIS — Z86.73 PERSONAL HISTORY OF TRANSIENT ISCHEMIC ATTACK (TIA), AND CEREBRAL INFARCTION WITHOUT RESIDUAL DEFICITS: ICD-10-CM

## 2023-03-21 DIAGNOSIS — Z95.1 PRESENCE OF AORTOCORONARY BYPASS GRAFT: ICD-10-CM

## 2023-03-21 LAB
ALBUMIN SERPL ELPH-MCNC: 2.1 G/DL — LOW (ref 3.3–5)
ALP SERPL-CCNC: 50 U/L — SIGNIFICANT CHANGE UP (ref 40–120)
ALT FLD-CCNC: 18 U/L — SIGNIFICANT CHANGE UP (ref 12–78)
ANION GAP SERPL CALC-SCNC: 4 MMOL/L — LOW (ref 5–17)
APPEARANCE UR: CLEAR — SIGNIFICANT CHANGE UP
APTT BLD: 29.7 SEC — SIGNIFICANT CHANGE UP (ref 27.5–35.5)
AST SERPL-CCNC: 14 U/L — LOW (ref 15–37)
BACTERIA # UR AUTO: ABNORMAL
BASOPHILS # BLD AUTO: 0.06 K/UL — SIGNIFICANT CHANGE UP (ref 0–0.2)
BASOPHILS NFR BLD AUTO: 0.5 % — SIGNIFICANT CHANGE UP (ref 0–2)
BILIRUB SERPL-MCNC: 0.3 MG/DL — SIGNIFICANT CHANGE UP (ref 0.2–1.2)
BILIRUB UR-MCNC: NEGATIVE — SIGNIFICANT CHANGE UP
BUN SERPL-MCNC: 22 MG/DL — SIGNIFICANT CHANGE UP (ref 7–23)
CALCIUM SERPL-MCNC: 7.9 MG/DL — LOW (ref 8.5–10.1)
CHLORIDE SERPL-SCNC: 106 MMOL/L — SIGNIFICANT CHANGE UP (ref 96–108)
CO2 SERPL-SCNC: 28 MMOL/L — SIGNIFICANT CHANGE UP (ref 22–31)
COLOR SPEC: YELLOW — SIGNIFICANT CHANGE UP
COMMENT - URINE: SIGNIFICANT CHANGE UP
CREAT SERPL-MCNC: 1 MG/DL — SIGNIFICANT CHANGE UP (ref 0.5–1.3)
DIFF PNL FLD: NEGATIVE — SIGNIFICANT CHANGE UP
EGFR: 79 ML/MIN/1.73M2 — SIGNIFICANT CHANGE UP
EOSINOPHIL # BLD AUTO: 0.66 K/UL — HIGH (ref 0–0.5)
EOSINOPHIL NFR BLD AUTO: 5.9 % — SIGNIFICANT CHANGE UP (ref 0–6)
EPI CELLS # UR: SIGNIFICANT CHANGE UP
GLUCOSE BLDC GLUCOMTR-MCNC: 164 MG/DL — HIGH (ref 70–99)
GLUCOSE SERPL-MCNC: 163 MG/DL — HIGH (ref 70–99)
GLUCOSE UR QL: 100 MG/DL
GRAN CASTS # UR COMP ASSIST: ABNORMAL /LPF
HCT VFR BLD CALC: 41.9 % — SIGNIFICANT CHANGE UP (ref 39–50)
HGB BLD-MCNC: 13.2 G/DL — SIGNIFICANT CHANGE UP (ref 13–17)
HYALINE CASTS # UR AUTO: ABNORMAL /LPF
IMM GRANULOCYTES NFR BLD AUTO: 0.4 % — SIGNIFICANT CHANGE UP (ref 0–0.9)
INR BLD: 1.07 RATIO — SIGNIFICANT CHANGE UP (ref 0.88–1.16)
KETONES UR-MCNC: ABNORMAL
LACTATE SERPL-SCNC: 1.4 MMOL/L — SIGNIFICANT CHANGE UP (ref 0.7–2)
LEUKOCYTE ESTERASE UR-ACNC: ABNORMAL
LYMPHOCYTES # BLD AUTO: 3.92 K/UL — HIGH (ref 1–3.3)
LYMPHOCYTES # BLD AUTO: 35 % — SIGNIFICANT CHANGE UP (ref 13–44)
MCHC RBC-ENTMCNC: 29.9 PG — SIGNIFICANT CHANGE UP (ref 27–34)
MCHC RBC-ENTMCNC: 31.5 G/DL — LOW (ref 32–36)
MCV RBC AUTO: 95 FL — SIGNIFICANT CHANGE UP (ref 80–100)
MONOCYTES # BLD AUTO: 0.85 K/UL — SIGNIFICANT CHANGE UP (ref 0–0.9)
MONOCYTES NFR BLD AUTO: 7.6 % — SIGNIFICANT CHANGE UP (ref 2–14)
NEUTROPHILS # BLD AUTO: 5.68 K/UL — SIGNIFICANT CHANGE UP (ref 1.8–7.4)
NEUTROPHILS NFR BLD AUTO: 50.6 % — SIGNIFICANT CHANGE UP (ref 43–77)
NITRITE UR-MCNC: NEGATIVE — SIGNIFICANT CHANGE UP
NRBC # BLD: 0 /100 WBCS — SIGNIFICANT CHANGE UP (ref 0–0)
PH UR: 6 — SIGNIFICANT CHANGE UP (ref 5–8)
PLATELET # BLD AUTO: 278 K/UL — SIGNIFICANT CHANGE UP (ref 150–400)
POTASSIUM SERPL-MCNC: 3.7 MMOL/L — SIGNIFICANT CHANGE UP (ref 3.5–5.3)
POTASSIUM SERPL-SCNC: 3.7 MMOL/L — SIGNIFICANT CHANGE UP (ref 3.5–5.3)
PROT SERPL-MCNC: 5.8 GM/DL — LOW (ref 6–8.3)
PROT UR-MCNC: 30 MG/DL
PROTHROM AB SERPL-ACNC: 12.7 SEC — SIGNIFICANT CHANGE UP (ref 10.5–13.4)
RAPID RVP RESULT: SIGNIFICANT CHANGE UP
RBC # BLD: 4.41 M/UL — SIGNIFICANT CHANGE UP (ref 4.2–5.8)
RBC # FLD: 16.2 % — HIGH (ref 10.3–14.5)
RBC CASTS # UR COMP ASSIST: NEGATIVE /HPF — SIGNIFICANT CHANGE UP (ref 0–4)
SARS-COV-2 RNA SPEC QL NAA+PROBE: SIGNIFICANT CHANGE UP
SODIUM SERPL-SCNC: 138 MMOL/L — SIGNIFICANT CHANGE UP (ref 135–145)
SP GR SPEC: 1.01 — SIGNIFICANT CHANGE UP (ref 1.01–1.02)
UROBILINOGEN FLD QL: 1 MG/DL
WBC # BLD: 11.21 K/UL — HIGH (ref 3.8–10.5)
WBC # FLD AUTO: 11.21 K/UL — HIGH (ref 3.8–10.5)
WBC UR QL: SIGNIFICANT CHANGE UP

## 2023-03-21 PROCEDURE — 99291 CRITICAL CARE FIRST HOUR: CPT

## 2023-03-21 PROCEDURE — 71045 X-RAY EXAM CHEST 1 VIEW: CPT | Mod: 26

## 2023-03-21 PROCEDURE — 99222 1ST HOSP IP/OBS MODERATE 55: CPT

## 2023-03-21 RX ORDER — GLUCAGON INJECTION, SOLUTION 0.5 MG/.1ML
1 INJECTION, SOLUTION SUBCUTANEOUS ONCE
Refills: 0 | Status: DISCONTINUED | OUTPATIENT
Start: 2023-03-21 | End: 2023-03-21

## 2023-03-21 RX ORDER — TAMSULOSIN HYDROCHLORIDE 0.4 MG/1
0.4 CAPSULE ORAL AT BEDTIME
Refills: 0 | Status: DISCONTINUED | OUTPATIENT
Start: 2023-03-21 | End: 2023-03-21

## 2023-03-21 RX ORDER — INSULIN LISPRO 100/ML
VIAL (ML) SUBCUTANEOUS AT BEDTIME
Refills: 0 | Status: DISCONTINUED | OUTPATIENT
Start: 2023-03-21 | End: 2023-03-21

## 2023-03-21 RX ORDER — DEXTROSE 50 % IN WATER 50 %
25 SYRINGE (ML) INTRAVENOUS ONCE
Refills: 0 | Status: DISCONTINUED | OUTPATIENT
Start: 2023-03-21 | End: 2023-03-21

## 2023-03-21 RX ORDER — SODIUM CHLORIDE 9 MG/ML
1000 INJECTION INTRAMUSCULAR; INTRAVENOUS; SUBCUTANEOUS ONCE
Refills: 0 | Status: COMPLETED | OUTPATIENT
Start: 2023-03-21 | End: 2023-03-21

## 2023-03-21 RX ORDER — LANOLIN ALCOHOL/MO/W.PET/CERES
3 CREAM (GRAM) TOPICAL AT BEDTIME
Refills: 0 | Status: DISCONTINUED | OUTPATIENT
Start: 2023-03-21 | End: 2023-03-21

## 2023-03-21 RX ORDER — CEFTRIAXONE 500 MG/1
1000 INJECTION, POWDER, FOR SOLUTION INTRAMUSCULAR; INTRAVENOUS ONCE
Refills: 0 | Status: COMPLETED | OUTPATIENT
Start: 2023-03-21 | End: 2023-03-21

## 2023-03-21 RX ORDER — ASPIRIN/CALCIUM CARB/MAGNESIUM 324 MG
81 TABLET ORAL DAILY
Refills: 0 | Status: DISCONTINUED | OUTPATIENT
Start: 2023-03-21 | End: 2023-03-21

## 2023-03-21 RX ORDER — SODIUM CHLORIDE 9 MG/ML
250 INJECTION INTRAMUSCULAR; INTRAVENOUS; SUBCUTANEOUS ONCE
Refills: 0 | Status: COMPLETED | OUTPATIENT
Start: 2023-03-21 | End: 2023-03-21

## 2023-03-21 RX ORDER — VALPROIC ACID (AS SODIUM SALT) 250 MG/5ML
1000 SOLUTION, ORAL ORAL AT BEDTIME
Refills: 0 | Status: DISCONTINUED | OUTPATIENT
Start: 2023-03-21 | End: 2023-03-21

## 2023-03-21 RX ORDER — DEXTROSE 50 % IN WATER 50 %
15 SYRINGE (ML) INTRAVENOUS ONCE
Refills: 0 | Status: DISCONTINUED | OUTPATIENT
Start: 2023-03-21 | End: 2023-03-21

## 2023-03-21 RX ORDER — INSULIN GLARGINE 100 [IU]/ML
10 INJECTION, SOLUTION SUBCUTANEOUS AT BEDTIME
Refills: 0 | Status: DISCONTINUED | OUTPATIENT
Start: 2023-03-21 | End: 2023-03-21

## 2023-03-21 RX ORDER — ACETAMINOPHEN 500 MG
650 TABLET ORAL EVERY 6 HOURS
Refills: 0 | Status: DISCONTINUED | OUTPATIENT
Start: 2023-03-21 | End: 2023-03-21

## 2023-03-21 RX ORDER — SODIUM CHLORIDE 9 MG/ML
500 INJECTION INTRAMUSCULAR; INTRAVENOUS; SUBCUTANEOUS ONCE
Refills: 0 | Status: DISCONTINUED | OUTPATIENT
Start: 2023-03-21 | End: 2023-03-21

## 2023-03-21 RX ORDER — DEXTROSE 50 % IN WATER 50 %
12.5 SYRINGE (ML) INTRAVENOUS ONCE
Refills: 0 | Status: DISCONTINUED | OUTPATIENT
Start: 2023-03-21 | End: 2023-03-21

## 2023-03-21 RX ORDER — VALPROIC ACID (AS SODIUM SALT) 250 MG/5ML
750 SOLUTION, ORAL ORAL DAILY
Refills: 0 | Status: DISCONTINUED | OUTPATIENT
Start: 2023-03-21 | End: 2023-03-21

## 2023-03-21 RX ORDER — APIXABAN 2.5 MG/1
5 TABLET, FILM COATED ORAL EVERY 12 HOURS
Refills: 0 | Status: DISCONTINUED | OUTPATIENT
Start: 2023-03-21 | End: 2023-03-21

## 2023-03-21 RX ORDER — SODIUM CHLORIDE 9 MG/ML
1000 INJECTION, SOLUTION INTRAVENOUS
Refills: 0 | Status: DISCONTINUED | OUTPATIENT
Start: 2023-03-21 | End: 2023-03-21

## 2023-03-21 RX ORDER — SENNA PLUS 8.6 MG/1
2 TABLET ORAL AT BEDTIME
Refills: 0 | Status: DISCONTINUED | OUTPATIENT
Start: 2023-03-21 | End: 2023-03-21

## 2023-03-21 RX ORDER — PREGABALIN 225 MG/1
1000 CAPSULE ORAL DAILY
Refills: 0 | Status: DISCONTINUED | OUTPATIENT
Start: 2023-03-21 | End: 2023-03-21

## 2023-03-21 RX ORDER — ATORVASTATIN CALCIUM 80 MG/1
80 TABLET, FILM COATED ORAL AT BEDTIME
Refills: 0 | Status: DISCONTINUED | OUTPATIENT
Start: 2023-03-21 | End: 2023-03-21

## 2023-03-21 RX ORDER — LEVETIRACETAM 250 MG/1
1500 TABLET, FILM COATED ORAL
Refills: 0 | Status: DISCONTINUED | OUTPATIENT
Start: 2023-03-21 | End: 2023-03-21

## 2023-03-21 RX ORDER — ALBUTEROL 90 UG/1
2 AEROSOL, METERED ORAL EVERY 6 HOURS
Refills: 0 | Status: DISCONTINUED | OUTPATIENT
Start: 2023-03-21 | End: 2023-03-21

## 2023-03-21 RX ORDER — POLYETHYLENE GLYCOL 3350 17 G/17G
17 POWDER, FOR SOLUTION ORAL DAILY
Refills: 0 | Status: DISCONTINUED | OUTPATIENT
Start: 2023-03-21 | End: 2023-03-21

## 2023-03-21 RX ORDER — INSULIN LISPRO 100/ML
VIAL (ML) SUBCUTANEOUS
Refills: 0 | Status: DISCONTINUED | OUTPATIENT
Start: 2023-03-21 | End: 2023-03-21

## 2023-03-21 RX ORDER — BUDESONIDE AND FORMOTEROL FUMARATE DIHYDRATE 160; 4.5 UG/1; UG/1
2 AEROSOL RESPIRATORY (INHALATION)
Refills: 0 | Status: DISCONTINUED | OUTPATIENT
Start: 2023-03-21 | End: 2023-03-21

## 2023-03-21 RX ADMIN — PREGABALIN 1000 MICROGRAM(S): 225 CAPSULE ORAL at 12:48

## 2023-03-21 RX ADMIN — SODIUM CHLORIDE 1000 MILLILITER(S): 9 INJECTION INTRAMUSCULAR; INTRAVENOUS; SUBCUTANEOUS at 03:28

## 2023-03-21 RX ADMIN — POLYETHYLENE GLYCOL 3350 17 GRAM(S): 17 POWDER, FOR SOLUTION ORAL at 12:43

## 2023-03-21 RX ADMIN — Medication 650 MILLIGRAM(S): at 12:42

## 2023-03-21 RX ADMIN — Medication 1: at 12:59

## 2023-03-21 RX ADMIN — Medication 750 MILLIGRAM(S): at 12:42

## 2023-03-21 RX ADMIN — SODIUM CHLORIDE 250 MILLILITER(S): 9 INJECTION INTRAMUSCULAR; INTRAVENOUS; SUBCUTANEOUS at 07:50

## 2023-03-21 RX ADMIN — SODIUM CHLORIDE 250 MILLILITER(S): 9 INJECTION INTRAMUSCULAR; INTRAVENOUS; SUBCUTANEOUS at 09:30

## 2023-03-21 RX ADMIN — CEFTRIAXONE 100 MILLIGRAM(S): 500 INJECTION, POWDER, FOR SOLUTION INTRAMUSCULAR; INTRAVENOUS at 13:05

## 2023-03-21 NOTE — ED ADULT NURSE NOTE - OBJECTIVE STATEMENT
patient 73 yo male from [facility]. son at bedside. as per son, pt is at baseline mental status. pt p/w hypotension. as per triage, pt was 60/40s at [facility]. pt placed in resuscitation room B. pt placed on cardiac monitor. fluids administered as ordered. BP now improved to 111/72. pt w/ hx of cardiac arrest. G tube noted to abd. dressing C/D/I. R 22 gauge IV to wrist from [facility].

## 2023-03-21 NOTE — ED PROVIDER NOTE - CLINICAL SUMMARY MEDICAL DECISION MAKING FREE TEXT BOX
pt presented today sent from [facility] for hypotension, pt has baseline tremors and dysphagia, as per his nurse at [facility] pt was given metoprolol as scheduled, found to be very hypotensive to his oncoming nurse, pt sent in for eval, labs/cardiac monitoring/ivf given, case signed out to the oncoming physician

## 2023-03-21 NOTE — PHYSICAL THERAPY INITIAL EVALUATION ADULT - ADDITIONAL COMMENTS
As per previous PT Eval: Patient reports he lives with his son in an apartment, 15 steps to apartment. Patient reports he was previously independent in all ADLs and did not use an assistive device for ambulation.

## 2023-03-21 NOTE — H&P ADULT - NSHPPHYSICALEXAM_GEN_ALL_CORE
CONSTITUTIONAL: alert and cooperative, no acute distress.   EYES: PERRL,  no scleral icterus  ENT: Mucosa moist, tongue normal.   NECK: Neck supple, trachea midline, S/P trach   CARDIAC: Normal S1 and S2. Regular rate and rhythms. No Pedal edema.  LUNGS: Equal air entry both lungs. No rales, rhonchi, wheezing. Normal respiratory effort.   ABDOMEN: Soft, nondistended, nontender. No guarding or rebound tenderness. No hepatomegaly or splenomegaly. Bowel sound normal  MUSCULOSKELETAL: Normocephalic, atraumatic. No significant deformity or joint abnormality  NEUROLOGICAL: Move all extremities, but all extremities with tremors and appear weak  SKIN: Sacral decubitus ulcer noted, does not appear infected. Normal turgor

## 2023-03-21 NOTE — ED PROVIDER NOTE - OBJECTIVE STATEMENT
72 year old male with h/o HTN, DM, HLD, CHF, COPD, BPH, s/p cabh (2014), second degree heart block s/p PPM, CKD stage 3, s/p cardiac arrest (on 11/14), s/p Peg ,s/p trach due to difficult airway, s/p trach and peg removal on 3/7/23, LUE/left subclavian DVT, cellulitis and bacteremia  presents today sent from [facility] found to be hypotensive, pt's vital: 60/41, HR-98, T-97.6 and saturating well on RA 72 year old male with h/o HTN, DM, HLD, CHF, COPD, BPH, s/p cabh (2014), second degree heart block s/p PPM, CKD stage 3, s/p cardiac arrest (on 11/14), s/p Peg ,s/p trach due to difficult airway, s/p trach and peg removal on 3/7/23, LUE/left subclavian DVT, cellulitis and bacteremia  presents today sent from [facility] found to be hypotensive, pt's vital: 60/41, HR-98, T-97.6 and saturating well on RA, pt brought in with 1L normal saline bag infusing, pt's sbp upon arrival is in the 80's, pt is awake and alert, pt's son 74 year old male with h/o HTN, DM, HLD, CHF, COPD, BPH, s/p cabh (2014), second degree heart block s/p PPM, CKD stage 3, s/p cardiac arrest (on 11/14), s/p Peg ,s/p trach due to difficult airway, s/p trach and peg removal on 3/7/23, LUE/left subclavian DVT, cellulitis and bacteremia  presents today sent from [facility] found to be hypotensive, pt's vital: 60/41, HR-98, T-97.6 and saturating well on RA, pt brought in with 1L normal saline bag infusing, pt's sbp upon arrival is in the 80's, pt is awake and alert

## 2023-03-21 NOTE — PHYSICAL THERAPY INITIAL EVALUATION ADULT - PERTINENT HX OF CURRENT PROBLEM, REHAB EVAL
Patient is a 72y old  Male who presents with a chief complaint of 72 years old male with h/o HTN, HLD, DM, COPD, CAD s/p PCI, CABG in 2014CHF, s/p PPM, CKD3, h/o CVA, left subclavian thrombosis on apixaban, cardiac arrest in 11/14/22( ROS 5minutes, thought to be due to severe hypoglycemia), seizure, s/p trach ( capped), s/p PEG but tolerating puree diet, decubitus ulcer present from [facility] with hypotension Patient was hypotensive to ? 60s/40 and asymptomatic per triage note  Systolic BP 90s to 110s in ED, afebrile, sat well at RA. WBC 11.21, plt 278, K 3.7, Cr 1, glucose 163, lactate 1.4. uA not suggestive of UTI. RVP negative. CXR image reviewed, minimal residual left base infiltrate.    Pt was recently discharged from hospital to [facility] after prolonged hospitalization.

## 2023-03-21 NOTE — H&P ADULT - PROBLEM SELECTOR PLAN 1
brought in from [facility] for asymptomatic hypotension  Likely related to BP med  Hold metoprolol and entresto  If BP stable and elevated, resume BP med one at a time  Would hold entresto for now given slightly elevated Cr  If need, can add midodrine prn

## 2023-03-21 NOTE — ED PROVIDER NOTE - PROGRESS NOTE DETAILS
sbp-70's repeat bp-90/66  [facility] spoke to [facility]'s nurse Val who reports that she came onto her shift at 11 pm, pt's bp was 60/40, at baseline pt has tremors and dysphagia, pt was last given metoprolol at 4-5pm, at that time his bp was soft (sbp in the low 100/65), pt was given a 500ml bolus before transport and sent in with 1L NS infusing Pt was seen and treated by Dr. Ohara.  According to RN pt had 1400 cc of ivf. Pt is alert and follows verbal commends oriented x 3 son at bed side repeat bp 82/58 hr 97, 16 pox 97 % Pt has a hx of trach unable to talk. Pt was seen and treated by Dr. Ohara.  According to RN pt had 1400 cc of ivf. Pt is alert and follows verbal commends oriented x 3 son at bed side repeat bp 82/58 hr 97, 16 pox 97 % Pt has a hx of trach unable to talk. According to [facility] rehab pt is taking Metoprolol 25 mg last dose 19:00 pm and Entresto 24m 26 mg at 19:00 pm last night. ICU PA is notified. Dr. Masterson icu attending was here eval pt and sts he knows pt well from last admission in icu last month and sts pt is stable to be admitted to telemetry. Dr. Ridley who cares pt at [facility] rehab is notified and sts admit pt to hospitalist until blood cultures are available. Dr. Livingston is notified and admit pt. Medical director Dr. Boudreaux sent Dr. Stock [facility] rehab medical director here eval pt and sts pt can be safely discharged back to the next door [facility] rehab after one dose of Rocephin 1 grm ivpb and repeat hr 95 bp 109/70 pox 95% in room air and rr 16. repeat bp-90/66  [facility] spoke to [facility]'s nurse Val who reports that she came onto her shift at 11 pm, pt's bp was 60/40, at baseline pt has tremors and dysphagia, pt was last given metoprolol at 4-5pm, at that time his bp was soft ( bp low -100/65), pt was given a 500ml bolus before transport and sent in with 1L NS infusing

## 2023-03-21 NOTE — ED ADULT TRIAGE NOTE - CHIEF COMPLAINT QUOTE
from [facility] , b/p was 60/40 hx bacteriemia , pt is at his baseline as per nurse, cardiac arrest , dressing on troath

## 2023-03-21 NOTE — ED ADULT NURSE NOTE - CHIEF COMPLAINT QUOTE
from [facility] , b/p was 60/40 hx bacteriemia , pt is at his baseline as per nurse, cardiac arrest , dressing on troath

## 2023-03-21 NOTE — H&P ADULT - HISTORY OF PRESENT ILLNESS
72 years old male with h/o HTN, HLD, DM, COPD, CAD s/p PCI, CABG in 2014CHF, s/p PPM, CKD3, h/o CVA, left subclavian thrombosis on apixaban, cardiac arrest in 11/14/22( ROS 5minutes, thought to be due to severe hypoglycemia), seizure, s/p trach ( capped), s/p PEG but tolerating puree diet, decubitus ulcer present from [facility] with hypotension Patient was hypotensive to ? 60s/40 and asymptomatic per triage note  Systolic BP 90s to 110s in ED, afebrile, sat well at RA. WBC 11.21, plt 278, K 3.7, Cr 1, glucose 163, lactate 1.4. uA not suggestive of UTI. RVP negative. CXR image reviewed, minimal residual left base infiltrate.

## 2023-03-21 NOTE — ED PROVIDER NOTE - PATIENT PORTAL LINK FT
You can access the FollowMyHealth Patient Portal offered by [facility] by registering at the following website: [email or web address]. By joining [facility]’s FollowMyHealth portal, you will also be able to view your health information using other applications (apps) compatible with our system.

## 2023-03-21 NOTE — CONSULT NOTE ADULT - ASSESSMENT
72 years old male with h/o HTN, HLD, DM, COPD, CAD s/p PCI, CABG in 2014CHF, s/p PPM, CKD3, h/o CVA, left subclavian thrombosis on apixaban, cardiac arrest in 11/14/22( ROS 5minutes, thought to be due to severe hypoglycemia), seizure, s/p trach ( capped), s/p PEG but tolerating puree diet, decubitus ulcer present from [facility] with hypotension Patient was hypotensive to ? 60s/40 and asymptomatic per triage note    Recommendations  -BP at baseline per son  -patient without complaints  -do not suspect infection  -patient recently started entresto at [facility], which could explain relative hypotension.    -BP improved during eval.    -Does not require ICU level of care at this time

## 2023-03-21 NOTE — H&P ADULT - NSHPLABSRESULTS_GEN_ALL_CORE
ECHO Nov/2022  1. Moderately decreased segmental left ventricular systolic function.  2. Normal global left ventricular systolic function.  3. Apical lateral segment, apical anterior segment, and apex are abnormal  4. Increased LV wall thickness.  5. Takotsubo cardiomyopathy.  6. There is mild concentric left ventricular hypertrophy.  7. Degenerative mitral valve.  8. Mild mitral annular calcification.  9. Mild mitral valve regurgitation.  10. Mild thickening and calcification of the anterior and posterior mitral valve leaflets.  11. Estimated pulmonary artery systolic pressure is 42.3 mmHg assuming a right atrial pressure of 5 mmHg, which is consistent with mild pulmonary hypertension.

## 2023-03-21 NOTE — H&P ADULT - ASSESSMENT
72 years old male with h/o HTN, HLD, DM, COPD, CAD s/p PCI, CABG in 2014CHF, s/p PPM, CKD3, h/o CVA, left subclavian thrombosis on apixaban, cardiac arrest in 11/14/22( ROS 5minutes, thought to be due to severe hypoglycemia), seizure, s/p trach ( capped), s/p PEG but tolerating puree diet, decubitus ulcer present from [facility] with hypotension Patient was hypotensive to ? 60s/40 and asymptomatic per triage note  Systolic BP 90s to 110s in ED, afebrile, sat well at RA. WBC 11.21, plt 278, K 3.7, Cr 1, glucose 163, lactate 1.4. uA not suggestive of UTI. RVP negative. CXR image reviewed, minimal residual left base infiltrate.     Admitted with hypotension

## 2023-03-21 NOTE — CONSULT NOTE ADULT - SUBJECTIVE AND OBJECTIVE BOX
REASON FOR CONSULT: ***    CONSULT REQUESTED BY: ***    Patient is a 72y old  Male who presents with a chief complaint of     BRIEF HOSPITAL COURSE: ***    Events last 24 hours: ***    PAST MEDICAL & SURGICAL HISTORY:  HTN (hypertension)      HLD (hyperlipidemia)      Diabetes mellitus      Lacunar infarction      BPH (benign prostatic hyperplasia)      CHF (congestive heart failure)      Chronic obstructive pulmonary disease, unspecified COPD type      S/P CABG (coronary artery bypass graft)  2014        Allergies    No Known Allergies    Intolerances      FAMILY HISTORY:      Review of Systems:  CONSTITUTIONAL: No fever, chills, or fatigue  EYES: No eye pain, visual disturbances, or discharge  ENMT:  No difficulty hearing, tinnitus, vertigo; No sinus or throat pain  NECK: No pain or stiffness  RESPIRATORY: No cough, wheezing, chills or hemoptysis; No shortness of breath  CARDIOVASCULAR: No chest pain, palpitations, dizziness, or leg swelling  GASTROINTESTINAL: No abdominal or epigastric pain. No nausea, vomiting, or hematemesis; No diarrhea or constipation. No melena or hematochezia.  GENITOURINARY: No dysuria, frequency, hematuria, or incontinence  NEUROLOGICAL: No headaches, memory loss, loss of strength, numbness, or tremors  SKIN: No itching, burning, rashes, or lesions   MUSCULOSKELETAL: No joint pain or swelling; No muscle, back, or extremity pain  PSYCHIATRIC: No depression, anxiety, mood swings, or difficulty sleeping      Medications:                                  ICU Vital Signs Last 24 Hrs  T(C): 36.4 (21 Mar 2023 08:10), Max: 36.4 (21 Mar 2023 04:28)  T(F): 97.5 (21 Mar 2023 08:10), Max: 97.6 (21 Mar 2023 04:28)  HR: 97 (21 Mar 2023 08:10) (95 - 100)  BP: 97/75 (21 Mar 2023 08:10) (89/62 - 111/72)  BP(mean): 82 (21 Mar 2023 08:10) (65 - 82)  ABP: --  ABP(mean): --  RR: 22 (21 Mar 2023 08:10) (16 - 22)  SpO2: 98% (21 Mar 2023 08:10) (97% - 100%)    O2 Parameters below as of 21 Mar 2023 08:10  Patient On (Oxygen Delivery Method): room air          Vital Signs Last 24 Hrs  T(C): 36.4 (21 Mar 2023 08:10), Max: 36.4 (21 Mar 2023 04:28)  T(F): 97.5 (21 Mar 2023 08:10), Max: 97.6 (21 Mar 2023 04:28)  HR: 97 (21 Mar 2023 08:10) (95 - 100)  BP: 97/75 (21 Mar 2023 08:10) (89/62 - 111/72)  BP(mean): 82 (21 Mar 2023 08:10) (65 - 82)  RR: 22 (21 Mar 2023 08:10) (16 - 22)  SpO2: 98% (21 Mar 2023 08:10) (97% - 100%)    Parameters below as of 21 Mar 2023 08:10  Patient On (Oxygen Delivery Method): room air            I&O's Detail        LABS:                        13.2   11.21 )-----------( 278      ( 21 Mar 2023 03:20 )             41.9     03-21    138  |  106  |  22  ----------------------------<  163<H>  3.7   |  28  |  1.00    Ca    7.9<L>      21 Mar 2023 03:20    TPro  5.8<L>  /  Alb  2.1<L>  /  TBili  0.3  /  DBili  x   /  AST  14<L>  /  ALT  18  /  AlkPhos  50  03-21          CAPILLARY BLOOD GLUCOSE        PT/INR - ( 21 Mar 2023 03:20 )   PT: 12.7 sec;   INR: 1.07 ratio         PTT - ( 21 Mar 2023 03:20 )  PTT:29.7 sec    CULTURES:      Physical Examination:    General: No acute distress.  Alert, oriented, interactive, nonfocal    HEENT: Pupils equal, reactive to light.  Symmetric.    PULM: Clear to auscultation bilaterally, no significant sputum production    CVS: Regular rate and rhythm, no murmurs, rubs, or gallops    ABD: Soft, nondistended, nontender, normoactive bowel sounds, no masses    EXT: No edema, nontender    SKIN: Warm and well perfused, no rashes noted.    RADIOLOGY: ***    CRITICAL CARE TIME SPENT: ***   REASON FOR CONSULT: hypotension    CONSULT REQUESTED BY: Dr. Lundberg    Patient is a 72y old  Male who presents with a chief complaint of 72 years old male with h/o HTN, HLD, DM, COPD, CAD s/p PCI, CABG in 2014CHF, s/p PPM, CKD3, h/o CVA, left subclavian thrombosis on apixaban, cardiac arrest in 11/14/22( ROS 5minutes, thought to be due to severe hypoglycemia), seizure, s/p trach ( capped), s/p PEG but tolerating puree diet, decubitus ulcer present from [facility] with hypotension Patient was hypotensive to ? 60s/40 and asymptomatic per triage note  Systolic BP 90s to 110s in ED, afebrile, sat well at RA. WBC 11.21, plt 278, K 3.7, Cr 1, glucose 163, lactate 1.4. uA not suggestive of UTI. RVP negative. CXR image reviewed, minimal residual left base infiltrate.       PAST MEDICAL & SURGICAL HISTORY:  HTN (hypertension)      HLD (hyperlipidemia)      Diabetes mellitus      Lacunar infarction      BPH (benign prostatic hyperplasia)      CHF (congestive heart failure)      Chronic obstructive pulmonary disease, unspecified COPD type      S/P CABG (coronary artery bypass graft)  2014        Allergies    No Known Allergies    Intolerances      FAMILY HISTORY:      Review of Systems:  patient denies complaints                                ICU Vital Signs Last 24 Hrs  T(C): 36.4 (21 Mar 2023 08:10), Max: 36.4 (21 Mar 2023 04:28)  T(F): 97.5 (21 Mar 2023 08:10), Max: 97.6 (21 Mar 2023 04:28)  HR: 97 (21 Mar 2023 08:10) (95 - 100)  BP: 97/75 (21 Mar 2023 08:10) (89/62 - 111/72)  BP(mean): 82 (21 Mar 2023 08:10) (65 - 82)  ABP: --  ABP(mean): --  RR: 22 (21 Mar 2023 08:10) (16 - 22)  SpO2: 98% (21 Mar 2023 08:10) (97% - 100%)    O2 Parameters below as of 21 Mar 2023 08:10  Patient On (Oxygen Delivery Method): room air          Vital Signs Last 24 Hrs  T(C): 36.4 (21 Mar 2023 08:10), Max: 36.4 (21 Mar 2023 04:28)  T(F): 97.5 (21 Mar 2023 08:10), Max: 97.6 (21 Mar 2023 04:28)  HR: 97 (21 Mar 2023 08:10) (95 - 100)  BP: 97/75 (21 Mar 2023 08:10) (89/62 - 111/72)  BP(mean): 82 (21 Mar 2023 08:10) (65 - 82)  RR: 22 (21 Mar 2023 08:10) (16 - 22)  SpO2: 98% (21 Mar 2023 08:10) (97% - 100%)    Parameters below as of 21 Mar 2023 08:10  Patient On (Oxygen Delivery Method): room air            I&O's Detail        LABS:                        13.2   11.21 )-----------( 278      ( 21 Mar 2023 03:20 )             41.9     03-21    138  |  106  |  22  ----------------------------<  163<H>  3.7   |  28  |  1.00    Ca    7.9<L>      21 Mar 2023 03:20    TPro  5.8<L>  /  Alb  2.1<L>  /  TBili  0.3  /  DBili  x   /  AST  14<L>  /  ALT  18  /  AlkPhos  50  03-21          CAPILLARY BLOOD GLUCOSE        PT/INR - ( 21 Mar 2023 03:20 )   PT: 12.7 sec;   INR: 1.07 ratio         PTT - ( 21 Mar 2023 03:20 )  PTT:29.7 sec    CULTURES:      Physical Examination:    General: No acute distress.  Alert, oriented, interactive, nonfocal    HEENT: Pupils equal, reactive to light.  Symmetric.    PULM: Clear to auscultation bilaterally, no significant sputum production    CVS: Regular rate and rhythm, no murmurs, rubs, or gallops    ABD: Soft, nondistended, nontender, normoactive bowel sounds, no masses    EXT: No edema, nontender    SKIN: Warm and well perfused, no rashes noted.      CRITICAL CARE TIME SPENT: 30 minutes

## 2023-03-21 NOTE — PHYSICAL THERAPY INITIAL EVALUATION ADULT - MODALITIES TREATMENT COMMENTS
stage II pressure injury: clean with normal saline, cover with small foam dressing, change prn, maintain strict incontinence care, good hygiene, offload and reposition

## 2023-03-22 LAB
CULTURE RESULTS: SIGNIFICANT CHANGE UP
SPECIMEN SOURCE: SIGNIFICANT CHANGE UP

## 2023-03-26 LAB
CULTURE RESULTS: SIGNIFICANT CHANGE UP
CULTURE RESULTS: SIGNIFICANT CHANGE UP
SPECIMEN SOURCE: SIGNIFICANT CHANGE UP
SPECIMEN SOURCE: SIGNIFICANT CHANGE UP

## 2023-03-30 NOTE — ED PROVIDER NOTE - NS ED ATTENDING STATEMENT MOD
[FreeTextEntry1] : 24yo P3 with AUB likely 2/2 to depo provera withdrawl and pelvic pain, r/o endometriosis vs. ovarian cyst.\par \par #AUB\par - discussed with patient that AUB is likely 2/2 to stopping depo. 1 week of OCPs while it decreased her bleeding, is unlikely enough time to stop bleeding completely. Patient does not want to take OCPs as it is causing nausea. Offered patient depo shot and she accepted. \par -will send TXA 1.3 x5 days\par -depo given today LOT  [id number], exp 2024-10\par -f/u cbc, tsh, prolactin, fsh, amh, std panel\par \par #pelvic pain/ovarian cyst\par -f/u vaginitis\par -f/u repeat TVUS\par \par #health maintenance\par -f/u pap and HPV\par \par RTC in 4 weeks for results and f/u\par  Attending Only I have personally provided the amount of critical care time documented below excluding time spent on separate procedures.

## 2023-04-01 ENCOUNTER — OUTPATIENT (OUTPATIENT)
Dept: OUTPATIENT SERVICES | Facility: HOSPITAL | Age: 73
LOS: 1 days | Discharge: HOME | End: 2023-04-01

## 2023-04-01 DIAGNOSIS — R05.1 ACUTE COUGH: ICD-10-CM

## 2023-04-01 DIAGNOSIS — Z95.1 PRESENCE OF AORTOCORONARY BYPASS GRAFT: Chronic | ICD-10-CM

## 2023-04-04 ENCOUNTER — INPATIENT (INPATIENT)
Facility: HOSPITAL | Age: 73
LOS: 2 days | Discharge: NURSING, REHAB OR LONG-TERM CARE | End: 2023-04-07
Attending: STUDENT IN AN ORGANIZED HEALTH CARE EDUCATION/TRAINING PROGRAM | Admitting: STUDENT IN AN ORGANIZED HEALTH CARE EDUCATION/TRAINING PROGRAM
Payer: MEDICAID

## 2023-04-04 VITALS
OXYGEN SATURATION: 97 % | TEMPERATURE: 98 F | RESPIRATION RATE: 18 BRPM | SYSTOLIC BLOOD PRESSURE: 152 MMHG | HEIGHT: 66 IN | WEIGHT: 150.8 LBS | DIASTOLIC BLOOD PRESSURE: 92 MMHG | HEART RATE: 92 BPM

## 2023-04-04 DIAGNOSIS — Z95.1 PRESENCE OF AORTOCORONARY BYPASS GRAFT: Chronic | ICD-10-CM

## 2023-04-04 LAB
ALBUMIN SERPL ELPH-MCNC: 2.5 G/DL — LOW (ref 3.3–5)
ALP SERPL-CCNC: 59 U/L — SIGNIFICANT CHANGE UP (ref 40–120)
ALT FLD-CCNC: 24 U/L — SIGNIFICANT CHANGE UP (ref 12–78)
ANION GAP SERPL CALC-SCNC: 5 MMOL/L — SIGNIFICANT CHANGE UP (ref 5–17)
AST SERPL-CCNC: 25 U/L — SIGNIFICANT CHANGE UP (ref 15–37)
BASOPHILS # BLD AUTO: 0.03 K/UL — SIGNIFICANT CHANGE UP (ref 0–0.2)
BASOPHILS NFR BLD AUTO: 0.4 % — SIGNIFICANT CHANGE UP (ref 0–2)
BILIRUB SERPL-MCNC: 0.2 MG/DL — SIGNIFICANT CHANGE UP (ref 0.2–1.2)
BUN SERPL-MCNC: 27 MG/DL — HIGH (ref 7–23)
CALCIUM SERPL-MCNC: 8.4 MG/DL — LOW (ref 8.5–10.1)
CHLORIDE SERPL-SCNC: 102 MMOL/L — SIGNIFICANT CHANGE UP (ref 96–108)
CO2 SERPL-SCNC: 31 MMOL/L — SIGNIFICANT CHANGE UP (ref 22–31)
CREAT SERPL-MCNC: 0.98 MG/DL — SIGNIFICANT CHANGE UP (ref 0.5–1.3)
EGFR: 81 ML/MIN/1.73M2 — SIGNIFICANT CHANGE UP
EOSINOPHIL # BLD AUTO: 0.24 K/UL — SIGNIFICANT CHANGE UP (ref 0–0.5)
EOSINOPHIL NFR BLD AUTO: 3.3 % — SIGNIFICANT CHANGE UP (ref 0–6)
GLUCOSE SERPL-MCNC: 114 MG/DL — HIGH (ref 70–99)
HCT VFR BLD CALC: 38.4 % — LOW (ref 39–50)
HGB BLD-MCNC: 12.4 G/DL — LOW (ref 13–17)
IMM GRANULOCYTES NFR BLD AUTO: 0.1 % — SIGNIFICANT CHANGE UP (ref 0–0.9)
INR BLD: 1.04 RATIO — SIGNIFICANT CHANGE UP (ref 0.88–1.16)
LYMPHOCYTES # BLD AUTO: 2.92 K/UL — SIGNIFICANT CHANGE UP (ref 1–3.3)
LYMPHOCYTES # BLD AUTO: 40.3 % — SIGNIFICANT CHANGE UP (ref 13–44)
MCHC RBC-ENTMCNC: 30 PG — SIGNIFICANT CHANGE UP (ref 27–34)
MCHC RBC-ENTMCNC: 32.3 G/DL — SIGNIFICANT CHANGE UP (ref 32–36)
MCV RBC AUTO: 93 FL — SIGNIFICANT CHANGE UP (ref 80–100)
MONOCYTES # BLD AUTO: 0.65 K/UL — SIGNIFICANT CHANGE UP (ref 0–0.9)
MONOCYTES NFR BLD AUTO: 9 % — SIGNIFICANT CHANGE UP (ref 2–14)
NEUTROPHILS # BLD AUTO: 3.4 K/UL — SIGNIFICANT CHANGE UP (ref 1.8–7.4)
NEUTROPHILS NFR BLD AUTO: 46.9 % — SIGNIFICANT CHANGE UP (ref 43–77)
NRBC # BLD: 0 /100 WBCS — SIGNIFICANT CHANGE UP (ref 0–0)
PLATELET # BLD AUTO: 161 K/UL — SIGNIFICANT CHANGE UP (ref 150–400)
POTASSIUM SERPL-MCNC: 3.9 MMOL/L — SIGNIFICANT CHANGE UP (ref 3.5–5.3)
POTASSIUM SERPL-SCNC: 3.9 MMOL/L — SIGNIFICANT CHANGE UP (ref 3.5–5.3)
PROT SERPL-MCNC: 6.7 GM/DL — SIGNIFICANT CHANGE UP (ref 6–8.3)
PROTHROM AB SERPL-ACNC: 12.4 SEC — SIGNIFICANT CHANGE UP (ref 10.5–13.4)
RBC # BLD: 4.13 M/UL — LOW (ref 4.2–5.8)
RBC # FLD: 16.3 % — HIGH (ref 10.3–14.5)
SODIUM SERPL-SCNC: 138 MMOL/L — SIGNIFICANT CHANGE UP (ref 135–145)
TROPONIN I, HIGH SENSITIVITY RESULT: 24.4 NG/L — SIGNIFICANT CHANGE UP
WBC # BLD: 7.35 K/UL — SIGNIFICANT CHANGE UP (ref 3.8–10.5)
WBC # FLD AUTO: 7.35 K/UL — SIGNIFICANT CHANGE UP (ref 3.8–10.5)

## 2023-04-04 PROCEDURE — 99223 1ST HOSP IP/OBS HIGH 75: CPT

## 2023-04-04 PROCEDURE — 93010 ELECTROCARDIOGRAM REPORT: CPT

## 2023-04-04 PROCEDURE — 99285 EMERGENCY DEPT VISIT HI MDM: CPT

## 2023-04-04 RX ORDER — DEXTROSE 50 % IN WATER 50 %
25 SYRINGE (ML) INTRAVENOUS ONCE
Refills: 0 | Status: DISCONTINUED | OUTPATIENT
Start: 2023-04-04 | End: 2023-04-07

## 2023-04-04 RX ORDER — DEXTROSE 50 % IN WATER 50 %
15 SYRINGE (ML) INTRAVENOUS ONCE
Refills: 0 | Status: DISCONTINUED | OUTPATIENT
Start: 2023-04-04 | End: 2023-04-07

## 2023-04-04 RX ORDER — LANOLIN ALCOHOL/MO/W.PET/CERES
3 CREAM (GRAM) TOPICAL AT BEDTIME
Refills: 0 | Status: DISCONTINUED | OUTPATIENT
Start: 2023-04-04 | End: 2023-04-07

## 2023-04-04 RX ORDER — ONDANSETRON 8 MG/1
4 TABLET, FILM COATED ORAL EVERY 8 HOURS
Refills: 0 | Status: DISCONTINUED | OUTPATIENT
Start: 2023-04-04 | End: 2023-04-07

## 2023-04-04 RX ORDER — INSULIN GLARGINE 100 [IU]/ML
8 INJECTION, SOLUTION SUBCUTANEOUS AT BEDTIME
Refills: 0 | Status: DISCONTINUED | OUTPATIENT
Start: 2023-04-05 | End: 2023-04-07

## 2023-04-04 RX ORDER — SODIUM CHLORIDE 9 MG/ML
1000 INJECTION, SOLUTION INTRAVENOUS
Refills: 0 | Status: DISCONTINUED | OUTPATIENT
Start: 2023-04-04 | End: 2023-04-07

## 2023-04-04 RX ORDER — GLUCAGON INJECTION, SOLUTION 0.5 MG/.1ML
1 INJECTION, SOLUTION SUBCUTANEOUS ONCE
Refills: 0 | Status: DISCONTINUED | OUTPATIENT
Start: 2023-04-04 | End: 2023-04-07

## 2023-04-04 RX ORDER — IPRATROPIUM/ALBUTEROL SULFATE 18-103MCG
3 AEROSOL WITH ADAPTER (GRAM) INHALATION ONCE
Refills: 0 | Status: COMPLETED | OUTPATIENT
Start: 2023-04-04 | End: 2023-04-04

## 2023-04-04 RX ORDER — ACETAMINOPHEN 500 MG
650 TABLET ORAL EVERY 6 HOURS
Refills: 0 | Status: DISCONTINUED | OUTPATIENT
Start: 2023-04-04 | End: 2023-04-07

## 2023-04-04 RX ORDER — DEXTROSE 50 % IN WATER 50 %
12.5 SYRINGE (ML) INTRAVENOUS ONCE
Refills: 0 | Status: DISCONTINUED | OUTPATIENT
Start: 2023-04-04 | End: 2023-04-07

## 2023-04-04 RX ORDER — INSULIN LISPRO 100/ML
VIAL (ML) SUBCUTANEOUS
Refills: 0 | Status: DISCONTINUED | OUTPATIENT
Start: 2023-04-04 | End: 2023-04-07

## 2023-04-04 NOTE — H&P ADULT - NSHPPHYSICALEXAM_GEN_ALL_CORE
GENERAL: NAD on NC, well-groomed, well-developed  HEAD:  Atraumatic, Normocephalic  EYES: conjunctiva and sclera clear  ENMT: ; Moist mucous membranes  NECK: Supple, No JVD, Normal thyroid  NERVOUS SYSTEM:  Alert & Oriented, Good concentration; Motor Strength 5/5 B/L upper and lower extremities; DTRs 2+ intact and symmetric  CHEST/LUNG: bilateral diffuse wheezing, No rales, rhonchi,  HEART: Regular rate and rhythm; No murmurs, rubs, or gallops  ABDOMEN: Soft, Nontender, Nondistended; Bowel sounds present  EXTREMITIES:  2+ Peripheral Pulses, No clubbing, cyanosis, or edema  SKIN: No rashes or lesions GENERAL: NAD on NC, well-groomed, well-developed  HEAD:  Atraumatic, Normocephalic  EYES: conjunctiva and sclera clear  ENMT: ; Moist mucous membranes  NECK: Supple, No JVD, Normal thyroid  NERVOUS SYSTEM:  Alert & Oriented, Good concentration; Motor Strength 5/5 B/L upper and lower extremities; DTRs 2+ intact and symmetric  CHEST/LUNG: bilateral diffuse wheezing, No rales, rhonchi,  HEART: Regular rate and rhythm; No murmurs, rubs, or gallops  ABDOMEN: +Peg tube, Soft, Nontender, Nondistended; Bowel sounds present  EXTREMITIES:  2+ Peripheral Pulses, No clubbing, cyanosis, or edema  SKIN: No rashes or lesions

## 2023-04-04 NOTE — ED ADULT NURSE NOTE - NSIMPLEMENTINTERV_GEN_ALL_ED
Implemented All Fall with Harm Risk Interventions:  [place] to call system. Call bell, personal items and telephone within reach. Instruct patient to call for assistance. Room bathroom lighting operational. Non-slip footwear when patient is off stretcher. Physically safe environment: no spills, clutter or unnecessary equipment. Stretcher in lowest position, wheels locked, appropriate side rails in place. Provide visual cue, wrist band, yellow gown, etc. Monitor gait and stability. Monitor for mental status changes and reorient to person, place, and time. Review medications for side effects contributing to fall risk. Reinforce activity limits and safety measures with patient and family. Provide visual clues: red socks.

## 2023-04-04 NOTE — H&P ADULT - TIME BILLING
Lab test review,  Vitals review,  Physical examination, Assessment and plan; Plan discussion with patient and family

## 2023-04-04 NOTE — H&P ADULT - NSHPLABSRESULTS_GEN_ALL_CORE
12.4   7.35  )-----------( 161      ( 04 Apr 2023 21:35 )              38.4     04-04    138  |  102  |  27<H>  ----------------------------<  114<H>  3.9   |  31  |  0.98    Ca    8.4<L>      04 Apr 2023 21:35    TPro  6.7  /  Alb  2.5<L>  /  TBili  0.2  /  DBili  x   /  AST  25  /  ALT  24  /  AlkPhos  59  04-04    PT/INR - ( 04 Apr 2023 21:35 )   PT: 12.4 sec;   INR: 1.04 ratio 12.4   7.35  )-----------( 161      ( 04 Apr 2023 21:35 )              38.4     04-04    138  |  102  |  27<H>  ----------------------------<  114<H>  3.9   |  31  |  0.98    Ca    8.4<L>      04 Apr 2023 21:35    TPro  6.7  /  Alb  2.5<L>  /  TBili  0.2  /  DBili  x   /  AST  25  /  ALT  24  /  AlkPhos  59  04-04    PT/INR - ( 04 Apr 2023 21:35 )   PT: 12.4 sec;   INR: 1.04 ratio    EKG: NSR @95bpm, RBBB, Q I, II, AVL V3-V6, no significant ST changes.

## 2023-04-04 NOTE — H&P ADULT - ASSESSMENT
Chest pain   r/o ACS   EKG no significant ST changes, CE neg x1  admit to telemetry   cont w/ serial CE  Cardiology consult in am     Acute on Chronic COPD   +wheezing on exam   will give trial of IV solu-medrol   cont w/ Symbicort and Albuterol.     HTN:   cont w/ Metoprolol     DM II   cont w/ Lantus   cont w/ FS monitoring w/ insulin s/s coverage     CAD   cont w/ Atorvastatin and Eliquis     Left Subclavian thrombosis  cont w/ Eliquis     Seizure   cont w/ Valproic acid and Keppra     Systolic CHF   no signs of fluid overload   cont w/ Metoprolol     BPH:   cont w/ Flomax     DVTp: Eliquis   Disposition: most likely [facility], f/u PT evaluation

## 2023-04-04 NOTE — H&P ADULT - NSHPREVIEWOFSYSTEMS_GEN_ALL_CORE
REVIEW OF SYSTEMS:    CONSTITUTIONAL: No fever, positive generalized weakness No weight loss  EYES: No eye pain, visual disturbances, or discharge  ENMT:  No difficulty hearing, tinnitus, vertigo; No sinus or throat pain  NECK: No pain or stiffness  RESPIRATORY: positive cough, wheezing, No shortness of breath, sputum or hemoptysis   CARDIOVASCULAR: positive chest pain, no palpitations, or leg swelling  GASTROINTESTINAL: No abdominal pain. No nausea, vomiting, diarrhea or constipation. No melena or hematochezia.  GENITOURINARY: No dysuria, frequency, hematuria, or incontinence  SKIN: No itching, burning, rashes, or lesions   MUSCULOSKELETAL: No joint pain or swelling; No muscle, back, or extremity pain  HEME/LYMPH: No easy bruising, or bleeding gums  NEUROLOGICAL: No headaches, Dizziness, memory loss, loss of strength, numbness, or tremors  PSYCHIATRIC: No depression, anxiety, mood swings, or difficulty sleeping

## 2023-04-04 NOTE — ED ADULT NURSE NOTE - CHIEF COMPLAINT QUOTE
from [facility] c/o chest pain 7/10 since 2015 hx cardiac arrest PCI with stent, CABG , has a peg tube and dressing in the troat

## 2023-04-04 NOTE — ED ADULT TRIAGE NOTE - CHIEF COMPLAINT QUOTE
from [facility] c/o chest pain 7/10 since 2015 hx cardiac arrest , has a peg tube and dressing in the troat from [facility] c/o chest pain 7/10 since 2015 hx cardiac arrest PCI with stent, CABG , has a peg tube and dressing in the troat

## 2023-04-04 NOTE — ED ADULT NURSE NOTE - OBJECTIVE STATEMENT
Pt AOx4 brought in from [facility] for c/o chest pain that began around 2000. Pt reports pain has since improved since onset. Pt noted to have wet cough at this time. Pt denies SOB, N/V/D, or dysuria. Pt placed on cardiac monitor. Son reports pt is on Eliquis for "clot in arm."

## 2023-04-04 NOTE — ED PROVIDER NOTE - OBJECTIVE STATEMENT
72 year old male with PMH of BPH, CHF, DM II, HTN, HLD CAD CABG 2014, CAD x 2-3 stents last one 2019, otherwise presenting to ED due to chest pain midchest with some coughing - pt recently had hospitalization for cardiac arrest and had been in hospital [facility] almost [facility]- for past 3 weeks tonight noted CP starting around 8pm. Denies any significant SOB otherwise.

## 2023-04-04 NOTE — ED CLERICAL - NS ED CLERK UNITS
TELE
Joshua Sebastian)  Cardiac Electrophysiology; Cardiology  [address] [place] [address]  Phone: [phone]  Fax: ()-  Follow Up Time: 2 weeks    GLADIS HERNANDEZ  Phone: (   )    -  Fax: (   )    -  Follow Up Time:     Virgilio Parrish  Cardiovascular Diseases  [address] [place] [address]  Phone: [phone]  Fax: [phone]  Follow Up Time: 2 weeks

## 2023-04-04 NOTE — H&P ADULT - HISTORY OF PRESENT ILLNESS
73 y/o  male with PMH of HTN, HLD, DM II, COPD, Seizure, CAD (CABG 2014/ s/p  x 2-3 stents last one 2019), chronic angina,  systolic CHF and hx of Cardiac arrest w/ intubation and trach with residual PEG tube presents from [facility] for Chest pain. Son at the bedside to collaborate history. Chest pain described as Left sided  that started around 8pm and is similar to pain experience with Angina. Pt also admits to wheezing and some dry cough. Pt denies SOB, Palpitations, dizziness or leg swelling. No recent fevers, N/V/D, abdominal pain or dysuria.     71 y/o  male with PMH of HTN, HLD, DM II, COPD, Seizure, hx of DVT, CAD (CABG 2014/ s/p  x 2-3 stents last one 2019), chronic angina,  systolic CHF, heart block s/p PPM and hx of Cardiac arrest w/ intubation and trach with residual PEG tube presents from [facility] for Chest pain. Son at the bedside to collaborate history. Chest pain described as Left sided  that started around 8pm and is similar to pain experience with Angina. Pt also admits to wheezing and some dry cough. Pt denies SOB, Palpitations, dizziness or leg swelling. No recent fevers, N/V/D, abdominal pain or dysuria.     71 y/o  male with PMH of HTN, HLD, DM II, COPD, Seizure, hx of  left subclavian thrombosis , CAD (CABG 2014/ s/p  x 2-3 stents last one 2019), chronic angina,  systolic CHF, heart block s/p PPM and hx of Cardiac arrest w/ intubation and trach with residual PEG tube presents from [facility] for Chest pain. Son at the bedside to collaborate history. Chest pain described as Left sided  that started around 8pm and is similar to pain experience with Angina. Pt also admits to wheezing and some dry cough. Pt denies SOB, Palpitations, dizziness or leg swelling. No recent fevers, N/V/D, abdominal pain or dysuria.

## 2023-04-04 NOTE — ED PROVIDER NOTE - CLINICAL SUMMARY MEDICAL DECISION MAKING FREE TEXT BOX
pt with chest pain - midchest with some wheezing, significant cardiac risk factors and hx - with CP at 8pm that still is present in ED - will admit for telemetry and r/o ACS. Did improve after nitro sublingual from [facility] as per family.

## 2023-04-05 LAB
A1C WITH ESTIMATED AVERAGE GLUCOSE RESULT: 6.9 % — HIGH (ref 4–5.6)
ESTIMATED AVERAGE GLUCOSE: 151 MG/DL — HIGH (ref 68–114)
GLUCOSE BLDC GLUCOMTR-MCNC: 196 MG/DL — HIGH (ref 70–99)
GLUCOSE BLDC GLUCOMTR-MCNC: 260 MG/DL — HIGH (ref 70–99)
GLUCOSE BLDC GLUCOMTR-MCNC: 285 MG/DL — HIGH (ref 70–99)
GLUCOSE BLDC GLUCOMTR-MCNC: 391 MG/DL — HIGH (ref 70–99)
HMPV RNA SPEC QL NAA+PROBE: DETECTED
RAPID RVP RESULT: DETECTED
SARS-COV-2 RNA SPEC QL NAA+PROBE: SIGNIFICANT CHANGE UP
TROPONIN I, HIGH SENSITIVITY RESULT: 18.1 NG/L — SIGNIFICANT CHANGE UP

## 2023-04-05 PROCEDURE — 99232 SBSQ HOSP IP/OBS MODERATE 35: CPT

## 2023-04-05 PROCEDURE — 71045 X-RAY EXAM CHEST 1 VIEW: CPT | Mod: 26

## 2023-04-05 RX ORDER — ALBUTEROL 90 UG/1
2 AEROSOL, METERED ORAL EVERY 6 HOURS
Refills: 0 | Status: DISCONTINUED | OUTPATIENT
Start: 2023-04-05 | End: 2023-04-07

## 2023-04-05 RX ORDER — IPRATROPIUM/ALBUTEROL SULFATE 18-103MCG
3 AEROSOL WITH ADAPTER (GRAM) INHALATION EVERY 6 HOURS
Refills: 0 | Status: DISCONTINUED | OUTPATIENT
Start: 2023-04-05 | End: 2023-04-07

## 2023-04-05 RX ORDER — ATORVASTATIN CALCIUM 80 MG/1
80 TABLET, FILM COATED ORAL AT BEDTIME
Refills: 0 | Status: DISCONTINUED | OUTPATIENT
Start: 2023-04-05 | End: 2023-04-07

## 2023-04-05 RX ORDER — APIXABAN 2.5 MG/1
5 TABLET, FILM COATED ORAL EVERY 12 HOURS
Refills: 0 | Status: DISCONTINUED | OUTPATIENT
Start: 2023-04-06 | End: 2023-04-07

## 2023-04-05 RX ORDER — LEVETIRACETAM 250 MG/1
750 TABLET, FILM COATED ORAL
Refills: 0 | Status: DISCONTINUED | OUTPATIENT
Start: 2023-04-05 | End: 2023-04-07

## 2023-04-05 RX ORDER — BUDESONIDE AND FORMOTEROL FUMARATE DIHYDRATE 160; 4.5 UG/1; UG/1
2 AEROSOL RESPIRATORY (INHALATION)
Refills: 0 | Status: DISCONTINUED | OUTPATIENT
Start: 2023-04-05 | End: 2023-04-07

## 2023-04-05 RX ORDER — TAMSULOSIN HYDROCHLORIDE 0.4 MG/1
0.4 CAPSULE ORAL AT BEDTIME
Refills: 0 | Status: DISCONTINUED | OUTPATIENT
Start: 2023-04-05 | End: 2023-04-07

## 2023-04-05 RX ORDER — NITROGLYCERIN 6.5 MG
0.4 CAPSULE, EXTENDED RELEASE ORAL
Refills: 0 | Status: DISCONTINUED | OUTPATIENT
Start: 2023-04-05 | End: 2023-04-07

## 2023-04-05 RX ORDER — VALPROIC ACID (AS SODIUM SALT) 250 MG/5ML
1000 SOLUTION, ORAL ORAL AT BEDTIME
Refills: 0 | Status: DISCONTINUED | OUTPATIENT
Start: 2023-04-05 | End: 2023-04-07

## 2023-04-05 RX ORDER — VALPROIC ACID (AS SODIUM SALT) 250 MG/5ML
750 SOLUTION, ORAL ORAL DAILY
Refills: 0 | Status: DISCONTINUED | OUTPATIENT
Start: 2023-04-05 | End: 2023-04-07

## 2023-04-05 RX ORDER — METOPROLOL TARTRATE 50 MG
25 TABLET ORAL
Refills: 0 | Status: DISCONTINUED | OUTPATIENT
Start: 2023-04-05 | End: 2023-04-07

## 2023-04-05 RX ORDER — PREGABALIN 225 MG/1
1000 CAPSULE ORAL DAILY
Refills: 0 | Status: DISCONTINUED | OUTPATIENT
Start: 2023-04-05 | End: 2023-04-07

## 2023-04-05 RX ORDER — SENNA PLUS 8.6 MG/1
2 TABLET ORAL AT BEDTIME
Refills: 0 | Status: DISCONTINUED | OUTPATIENT
Start: 2023-04-05 | End: 2023-04-07

## 2023-04-05 RX ADMIN — LEVETIRACETAM 750 MILLIGRAM(S): 250 TABLET, FILM COATED ORAL at 18:03

## 2023-04-05 RX ADMIN — PREGABALIN 1000 MICROGRAM(S): 225 CAPSULE ORAL at 13:47

## 2023-04-05 RX ADMIN — Medication 2: at 08:47

## 2023-04-05 RX ADMIN — SENNA PLUS 2 TABLET(S): 8.6 TABLET ORAL at 21:48

## 2023-04-05 RX ADMIN — LEVETIRACETAM 750 MILLIGRAM(S): 250 TABLET, FILM COATED ORAL at 05:32

## 2023-04-05 RX ADMIN — Medication 750 MILLIGRAM(S): at 13:46

## 2023-04-05 RX ADMIN — Medication 3 MILLILITER(S): at 17:18

## 2023-04-05 RX ADMIN — Medication 1000 MILLIGRAM(S): at 21:48

## 2023-04-05 RX ADMIN — Medication 6: at 16:46

## 2023-04-05 RX ADMIN — Medication 40 MILLIGRAM(S): at 00:31

## 2023-04-05 RX ADMIN — Medication 10: at 11:42

## 2023-04-05 RX ADMIN — Medication 40 MILLIGRAM(S): at 05:32

## 2023-04-05 RX ADMIN — TAMSULOSIN HYDROCHLORIDE 0.4 MILLIGRAM(S): 0.4 CAPSULE ORAL at 21:48

## 2023-04-05 RX ADMIN — Medication 25 MILLIGRAM(S): at 05:32

## 2023-04-05 RX ADMIN — Medication 40 MILLIGRAM(S): at 13:46

## 2023-04-05 RX ADMIN — ATORVASTATIN CALCIUM 80 MILLIGRAM(S): 80 TABLET, FILM COATED ORAL at 21:49

## 2023-04-05 RX ADMIN — INSULIN GLARGINE 8 UNIT(S): 100 INJECTION, SOLUTION SUBCUTANEOUS at 21:49

## 2023-04-05 RX ADMIN — Medication 3 MILLILITER(S): at 11:26

## 2023-04-05 RX ADMIN — Medication 25 MILLIGRAM(S): at 18:02

## 2023-04-05 RX ADMIN — Medication 3 MILLILITER(S): at 23:24

## 2023-04-05 RX ADMIN — Medication 3 MILLILITER(S): at 00:11

## 2023-04-05 NOTE — PROGRESS NOTE ADULT - ASSESSMENT
Chest pain   r/o ACS   EKG no significant ST changes,  admit to telemetry   - trop x2 negative, repeat pending for AM  - if negative can dc back to [facility]       Acute on Chronic COPD   +wheezing on exam   IV steroids given, will hold off at exam history not consistent with COPD excerbation  cont w/ Symbicort and Albuterol.     HTN:   cont w/ Metoprolol     DM II   cont w/ Lantus   cont w/ FS monitoring w/ insulin s/s coverage     CAD   cont w/ Atorvastatin and Eliquis     Left Subclavian thrombosis  cont w/ Eliquis     Seizure   cont w/ Valproic acid and Keppra     Systolic CHF   no signs of fluid overload   cont w/ Metoprolol     BPH:   cont w/ Flomax     DVTp: Eliquis   Disposition: most likely [facility], f/u PT evaluation     Chest pain   r/o ACS   EKG no significant ST changes,  admit to telemetry   - trop x2 negative, repeat pending for AM  - if negative can dc back to [facility]       Chronic COPD   +wheezing on exam   IV steroids given, will hold off at exam and  history not consistent with COPD excerbation  - will continue to monitor  cont w/ Symbicort and Albuterol.     HTN:   cont w/ Metoprolol     DM II   cont w/ Lantus   cont w/ FS monitoring w/ insulin s/s coverage     CAD   cont w/ Atorvastatin and Eliquis     Left Subclavian thrombosis  cont w/ Eliquis     Seizure   cont w/ Valproic acid and Keppra     Systolic CHF   no signs of fluid overload   cont w/ Metoprolol     BPH:   cont w/ Flomax     DVTp: Eliquis   Disposition: most likely [facility], f/u PT evaluation

## 2023-04-06 ENCOUNTER — TRANSCRIPTION ENCOUNTER (OUTPATIENT)
Age: 73
End: 2023-04-06

## 2023-04-06 LAB
ANION GAP SERPL CALC-SCNC: 3 MMOL/L — LOW (ref 5–17)
ANION GAP SERPL CALC-SCNC: 4 MMOL/L — LOW (ref 5–17)
BASOPHILS # BLD AUTO: 0.03 K/UL — SIGNIFICANT CHANGE UP (ref 0–0.2)
BASOPHILS NFR BLD AUTO: 0.4 % — SIGNIFICANT CHANGE UP (ref 0–2)
BUN SERPL-MCNC: 34 MG/DL — HIGH (ref 7–23)
BUN SERPL-MCNC: 35 MG/DL — HIGH (ref 7–23)
CALCIUM SERPL-MCNC: 8.3 MG/DL — LOW (ref 8.5–10.1)
CALCIUM SERPL-MCNC: 8.4 MG/DL — LOW (ref 8.5–10.1)
CHLORIDE SERPL-SCNC: 105 MMOL/L — SIGNIFICANT CHANGE UP (ref 96–108)
CHLORIDE SERPL-SCNC: 105 MMOL/L — SIGNIFICANT CHANGE UP (ref 96–108)
CO2 SERPL-SCNC: 28 MMOL/L — SIGNIFICANT CHANGE UP (ref 22–31)
CO2 SERPL-SCNC: 33 MMOL/L — HIGH (ref 22–31)
CREAT SERPL-MCNC: 0.95 MG/DL — SIGNIFICANT CHANGE UP (ref 0.5–1.3)
CREAT SERPL-MCNC: 1.31 MG/DL — HIGH (ref 0.5–1.3)
EGFR: 57 ML/MIN/1.73M2 — LOW
EGFR: 84 ML/MIN/1.73M2 — SIGNIFICANT CHANGE UP
EOSINOPHIL # BLD AUTO: 0.03 K/UL — SIGNIFICANT CHANGE UP (ref 0–0.5)
EOSINOPHIL NFR BLD AUTO: 0.4 % — SIGNIFICANT CHANGE UP (ref 0–6)
GLUCOSE BLDC GLUCOMTR-MCNC: 157 MG/DL — HIGH (ref 70–99)
GLUCOSE BLDC GLUCOMTR-MCNC: 198 MG/DL — HIGH (ref 70–99)
GLUCOSE BLDC GLUCOMTR-MCNC: 214 MG/DL — HIGH (ref 70–99)
GLUCOSE BLDC GLUCOMTR-MCNC: 219 MG/DL — HIGH (ref 70–99)
GLUCOSE BLDC GLUCOMTR-MCNC: 246 MG/DL — HIGH (ref 70–99)
GLUCOSE SERPL-MCNC: 172 MG/DL — HIGH (ref 70–99)
GLUCOSE SERPL-MCNC: 228 MG/DL — HIGH (ref 70–99)
HCT VFR BLD CALC: 33.8 % — LOW (ref 39–50)
HGB BLD-MCNC: 11 G/DL — LOW (ref 13–17)
IMM GRANULOCYTES NFR BLD AUTO: 0.4 % — SIGNIFICANT CHANGE UP (ref 0–0.9)
LYMPHOCYTES # BLD AUTO: 3.06 K/UL — SIGNIFICANT CHANGE UP (ref 1–3.3)
LYMPHOCYTES # BLD AUTO: 38.7 % — SIGNIFICANT CHANGE UP (ref 13–44)
MAGNESIUM SERPL-MCNC: 1.9 MG/DL — SIGNIFICANT CHANGE UP (ref 1.6–2.6)
MAGNESIUM SERPL-MCNC: 2.2 MG/DL — SIGNIFICANT CHANGE UP (ref 1.6–2.6)
MCHC RBC-ENTMCNC: 30.7 PG — SIGNIFICANT CHANGE UP (ref 27–34)
MCHC RBC-ENTMCNC: 32.5 G/DL — SIGNIFICANT CHANGE UP (ref 32–36)
MCV RBC AUTO: 94.4 FL — SIGNIFICANT CHANGE UP (ref 80–100)
MONOCYTES # BLD AUTO: 0.59 K/UL — SIGNIFICANT CHANGE UP (ref 0–0.9)
MONOCYTES NFR BLD AUTO: 7.5 % — SIGNIFICANT CHANGE UP (ref 2–14)
NEUTROPHILS # BLD AUTO: 4.16 K/UL — SIGNIFICANT CHANGE UP (ref 1.8–7.4)
NEUTROPHILS NFR BLD AUTO: 52.6 % — SIGNIFICANT CHANGE UP (ref 43–77)
NRBC # BLD: 0 /100 WBCS — SIGNIFICANT CHANGE UP (ref 0–0)
PLATELET # BLD AUTO: 173 K/UL — SIGNIFICANT CHANGE UP (ref 150–400)
POTASSIUM SERPL-MCNC: 3.3 MMOL/L — LOW (ref 3.5–5.3)
POTASSIUM SERPL-MCNC: 4.5 MMOL/L — SIGNIFICANT CHANGE UP (ref 3.5–5.3)
POTASSIUM SERPL-SCNC: 3.3 MMOL/L — LOW (ref 3.5–5.3)
POTASSIUM SERPL-SCNC: 4.5 MMOL/L — SIGNIFICANT CHANGE UP (ref 3.5–5.3)
RBC # BLD: 3.58 M/UL — LOW (ref 4.2–5.8)
RBC # FLD: 16.6 % — HIGH (ref 10.3–14.5)
SODIUM SERPL-SCNC: 137 MMOL/L — SIGNIFICANT CHANGE UP (ref 135–145)
SODIUM SERPL-SCNC: 141 MMOL/L — SIGNIFICANT CHANGE UP (ref 135–145)
TROPONIN I, HIGH SENSITIVITY RESULT: 25.4 NG/L — SIGNIFICANT CHANGE UP
WBC # BLD: 7.9 K/UL — SIGNIFICANT CHANGE UP (ref 3.8–10.5)
WBC # FLD AUTO: 7.9 K/UL — SIGNIFICANT CHANGE UP (ref 3.8–10.5)

## 2023-04-06 PROCEDURE — 99232 SBSQ HOSP IP/OBS MODERATE 35: CPT

## 2023-04-06 PROCEDURE — 93010 ELECTROCARDIOGRAM REPORT: CPT

## 2023-04-06 RX ORDER — POTASSIUM CHLORIDE 20 MEQ
40 PACKET (EA) ORAL EVERY 4 HOURS
Refills: 0 | Status: DISCONTINUED | OUTPATIENT
Start: 2023-04-06 | End: 2023-04-06

## 2023-04-06 RX ORDER — POTASSIUM CHLORIDE 20 MEQ
40 PACKET (EA) ORAL ONCE
Refills: 0 | Status: DISCONTINUED | OUTPATIENT
Start: 2023-04-06 | End: 2023-04-06

## 2023-04-06 RX ORDER — POTASSIUM CHLORIDE 20 MEQ
40 PACKET (EA) ORAL EVERY 4 HOURS
Refills: 0 | Status: COMPLETED | OUTPATIENT
Start: 2023-04-06 | End: 2023-04-06

## 2023-04-06 RX ADMIN — PREGABALIN 1000 MICROGRAM(S): 225 CAPSULE ORAL at 11:44

## 2023-04-06 RX ADMIN — Medication 25 MILLIGRAM(S): at 05:48

## 2023-04-06 RX ADMIN — Medication 40 MILLIEQUIVALENT(S): at 09:58

## 2023-04-06 RX ADMIN — ATORVASTATIN CALCIUM 80 MILLIGRAM(S): 80 TABLET, FILM COATED ORAL at 21:04

## 2023-04-06 RX ADMIN — Medication 750 MILLIGRAM(S): at 11:44

## 2023-04-06 RX ADMIN — Medication 1000 MILLIGRAM(S): at 21:04

## 2023-04-06 RX ADMIN — LEVETIRACETAM 750 MILLIGRAM(S): 250 TABLET, FILM COATED ORAL at 05:47

## 2023-04-06 RX ADMIN — BUDESONIDE AND FORMOTEROL FUMARATE DIHYDRATE 2 PUFF(S): 160; 4.5 AEROSOL RESPIRATORY (INHALATION) at 17:06

## 2023-04-06 RX ADMIN — APIXABAN 5 MILLIGRAM(S): 2.5 TABLET, FILM COATED ORAL at 05:48

## 2023-04-06 RX ADMIN — LEVETIRACETAM 750 MILLIGRAM(S): 250 TABLET, FILM COATED ORAL at 17:05

## 2023-04-06 RX ADMIN — Medication 25 MILLIGRAM(S): at 17:06

## 2023-04-06 RX ADMIN — APIXABAN 5 MILLIGRAM(S): 2.5 TABLET, FILM COATED ORAL at 17:06

## 2023-04-06 RX ADMIN — Medication 2: at 08:14

## 2023-04-06 RX ADMIN — Medication 40 MILLIEQUIVALENT(S): at 13:23

## 2023-04-06 RX ADMIN — Medication 200 MILLIGRAM(S): at 20:19

## 2023-04-06 RX ADMIN — Medication 4: at 11:44

## 2023-04-06 RX ADMIN — Medication 2: at 17:12

## 2023-04-06 RX ADMIN — INSULIN GLARGINE 8 UNIT(S): 100 INJECTION, SOLUTION SUBCUTANEOUS at 21:03

## 2023-04-06 RX ADMIN — Medication 3 MILLILITER(S): at 06:34

## 2023-04-06 RX ADMIN — BUDESONIDE AND FORMOTEROL FUMARATE DIHYDRATE 2 PUFF(S): 160; 4.5 AEROSOL RESPIRATORY (INHALATION) at 05:48

## 2023-04-06 RX ADMIN — Medication 3 MILLILITER(S): at 11:23

## 2023-04-06 RX ADMIN — Medication 3 MILLILITER(S): at 17:03

## 2023-04-06 RX ADMIN — TAMSULOSIN HYDROCHLORIDE 0.4 MILLIGRAM(S): 0.4 CAPSULE ORAL at 21:04

## 2023-04-06 NOTE — DISCHARGE NOTE PROVIDER - HOSPITAL COURSE
atypical Chest pain   r/o ACS   EKG no significant ST changes,  - trop x3 negative  - chest pain resolved, without intervention      Chronic COPD   cont w/ Symbicort and Albuterol.     HTN:   cont w/ Metoprolol     DM II   cont w/ Lantus   cont w/ FS monitoring w/ insulin s/s coverage     CAD   cont w/ Atorvastatin and Eliquis     Left Subclavian thrombosis  cont w/ Eliquis     Seizure   cont w/ Valproic acid and Keppra     Systolic CHF   no signs of fluid overload   cont w/ Metoprolol     BPH:   cont w/ Flomax             Vital Signs Last 24 Hrs  T(C): 36.2 (06 Apr 2023 10:33), Max: 36.7 (05 Apr 2023 16:23)  T(F): 97.2 (06 Apr 2023 10:33), Max: 98.1 (05 Apr 2023 16:23)  HR: 82 (06 Apr 2023 11:45) (77 - 103)  BP: 134/84 (06 Apr 2023 10:33) (123/79 - 134/84)  BP(mean): --  RR: 18 (06 Apr 2023 10:33) (18 - 18)  SpO2: 98% (06 Apr 2023 11:45) (95% - 98%)    Parameters below as of 06 Apr 2023 11:45  Patient On (Oxygen Delivery Method): room air      General: NAD, resting comfortably in bed  HEENT: NC/AT, PERRLA, EOMI  Neck: thyroid normal, no nodules, no lymphadenopathy, no JVD  Lungs: CTA bilaterally, no rhonchi, no wheezes  Cardio: S1S2+, RRR, no murmurs  Abdominal: soft, NT, ND, BS+  Back: no CVA tenderness, no ulcers visualized in the back  Extremities: no edema, no calf tenderness, no ulcers in the feet  Neuro: AAOx3, CN 2-12 grossly intact.  sensation intact in all extremities, 5/5 strength     atypical Chest pain   r/o ACS   EKG no significant ST changes,  - trop x3 negative  - chest pain resolved, without intervention  - Cardiology evaluated the patient no acute intervention.    Chronic COPD   cont w/ Symbicort and Albuterol.     HTN:   cont w/ Metoprolol     DM II   cont w/ Lantus   cont w/ FS monitoring w/ insulin s/s coverage     CAD   cont w/ Atorvastatin and Eliquis     Left Subclavian thrombosis  cont w/ Eliquis     Seizure   cont w/ Valproic acid and Keppra     Systolic CHF   no signs of fluid overload   cont w/ Metoprolol     BPH:   cont w/ Flomax             Vital Signs Last 24 Hrs  T(C): 36.2 (06 Apr 2023 10:33), Max: 36.7 (05 Apr 2023 16:23)  T(F): 97.2 (06 Apr 2023 10:33), Max: 98.1 (05 Apr 2023 16:23)  HR: 82 (06 Apr 2023 11:45) (77 - 103)  BP: 134/84 (06 Apr 2023 10:33) (123/79 - 134/84)  BP(mean): --  RR: 18 (06 Apr 2023 10:33) (18 - 18)  SpO2: 98% (06 Apr 2023 11:45) (95% - 98%)    Parameters below as of 06 Apr 2023 11:45  Patient On (Oxygen Delivery Method): room air      General: NAD, resting comfortably in bed  HEENT: NC/AT, PERRLA, EOMI  Neck: thyroid normal, no nodules, no lymphadenopathy, no JVD  Lungs: CTA bilaterally, no rhonchi, no wheezes  Cardio: S1S2+, RRR, no murmurs  Abdominal: soft, NT, ND, BS+  Back: no CVA tenderness, no ulcers visualized in the back  Extremities: no edema, no calf tenderness, no ulcers in the feet  Neuro: AAOx3, CN 2-12 grossly intact.  sensation intact in all extremities, 5/5 strength

## 2023-04-06 NOTE — DISCHARGE NOTE PROVIDER - NSDCMRMEDTOKEN_GEN_ALL_CORE_FT
albuterol 90 mcg/inh inhalation aerosol: 2 puff(s) inhaled every 6 hours, As Needed -for shortness of breath and/or wheezing   apixaban 5 mg oral tablet: 1 tab(s) orally every 12 hours  aspirin 81 mg oral delayed release tablet: 1 tab(s) orally once a day  cyanocobalamin 1000 mcg oral tablet: 1 tab(s) orally once a day  Entresto 24 mg-26 mg oral tablet: 1 tab(s) orally 2 times a day  insulin glargine 100 units/mL subcutaneous solution: 10 unit(s) subcutaneous once a day (at bedtime)  levETIRAcetam 100 mg/mL oral solution: 15 milliliter(s) orally 2 times a day  Lipitor 80 mg oral tablet: 1 tab(s) orally once a day  Liquifilm Tears preserved ophthalmic solution: 1 drop(s) to each affected eye 2 times a day  metoprolol tartrate 25 mg oral tablet: 1 tab(s) orally 2 times a day  MiraLax oral powder for reconstitution: 17 gram(s) orally once a day   Multiple Vitamins with Minerals oral liquid: 1  orally once a day  senna oral tablet: 2 tab(s) orally once a day (at bedtime)  Symbicort 160 mcg-4.5 mcg/inh inhalation aerosol: 2 puff(s) inhaled 2 times a day  tamsulosin 0.4 mg oral capsule: 1 cap(s) orally once a day  valproic acid 250 mg/5 mL oral liquid: 750 mg orally or PEG in AM  1000mg orally or PEG in the evening

## 2023-04-06 NOTE — DISCHARGE NOTE PROVIDER - NSDCCPCAREPLAN_GEN_ALL_CORE_FT
PRINCIPAL DISCHARGE DIAGNOSIS  Diagnosis: Chest pain  Assessment and Plan of Treatment: Tropinin x3 negative, Chest pain is not present. Advised patient to follow up with cardiology.

## 2023-04-06 NOTE — PROGRESS NOTE ADULT - ASSESSMENT
Chest pain   r/o ACS   EKG no significant ST changes,  admit to telemetry   - trop x3 negative  - noted to have 20 episodes of Vtach, pacemaker fired  - Cardiology consulted  - State BMP and Mg levels ordered      Chronic COPD   +wheezing on exam   IV steroids given, will hold off at exam and  history not consistent with COPD excerbation  - will continue to monitor  cont w/ Symbicort and Albuterol.     HTN:   cont w/ Metoprolol     DM II   cont w/ Lantus   cont w/ FS monitoring w/ insulin s/s coverage     CAD   cont w/ Atorvastatin and Eliquis     Left Subclavian thrombosis  cont w/ Eliquis     Seizure   cont w/ Valproic acid and Keppra     Systolic CHF   no signs of fluid overload   cont w/ Metoprolol     BPH:   cont w/ Flomax     DVTp: Eliquis   Disposition: most likely [facility], f/u PT evaluation

## 2023-04-06 NOTE — DISCHARGE NOTE PROVIDER - PROVIDER TOKENS
FREE:[LAST:[.],FIRST:[.],PHONE:[(   )    -],FAX:[(   )    -],ADDRESS:[Please follow up with your caridologist as well as primary care doctor],FOLLOWUP:[1-3 days]]

## 2023-04-06 NOTE — DISCHARGE NOTE PROVIDER - CARE PROVIDER_API CALL
., .  Please follow up with your caridologist as well as primary care doctor  Phone: (   )    -  Fax: (   )    -  Follow Up Time: 1-3 days

## 2023-04-06 NOTE — PROGRESS NOTE ADULT - SUBJECTIVE AND OBJECTIVE BOX
CC: Patient is a 72y old  Male who presents with a chief complaint of Chest pain (04 Apr 2023 23:58)      Patient seen and examined at bedside, No acute overnight events.     ROS: Denies fever, nausea, vomiting, chest pain, SOB, abdominal pain, diarrhea and constipation    Vital Sign  Vital Signs Last 24 Hrs  T(C): 36.7 (05 Apr 2023 16:23), Max: 36.9 (04 Apr 2023 23:36)  T(F): 98.1 (05 Apr 2023 16:23), Max: 98.5 (04 Apr 2023 23:36)  HR: 78 (05 Apr 2023 17:59) (9 - 98)  BP: 129/73 (05 Apr 2023 16:23) (119/74 - 152/92)  BP(mean): --  RR: 18 (05 Apr 2023 16:23) (18 - 18)  SpO2: 96% (05 Apr 2023 17:59) (94% - 99%)    Parameters below as of 05 Apr 2023 17:59  Patient On (Oxygen Delivery Method): nasal cannula        Physical Exam:   Gen: NAD  HEENT: NCAT, EOMI, PERRLA, Pupils_  CVS: RRR, +S1/S2, no murmurs, rubs or gallops appreciated  Lungs: CTAB, no wheeze, rales, rhonchi  Abdomen: +BS, soft, ND, NT. no palpable flank tenderness or mass, no CVA tenderness  Ext: No cyanosis, edema or calf tenderness  Neuro: AAOx3, no focal deficits.    Labs:                        12.4   7.35  )-----------( 161      ( 04 Apr 2023 21:35 )             38.4   04-04    138  |  102  |  27<H>  ----------------------------<  114<H>  3.9   |  31  |  0.98    Ca    8.4<L>      04 Apr 2023 21:35    TPro  6.7  /  Alb  2.5<L>  /  TBili  0.2  /  DBili  x   /  AST  25  /  ALT  24  /  AlkPhos  59  04-04 04-04 @ 07:01  -  04-05 @ 07:00  --------------------------------------------------------  IN: 0 mL / OUT: 2 mL / NET: -2 mL        Radiology:  *Pull    Medications:  MEDICATIONS  (STANDING):  albuterol/ipratropium for Nebulization 3 milliLiter(s) Nebulizer every 6 hours  atorvastatin 80 milliGRAM(s) Oral at bedtime  budesonide 160 MICROgram(s)/formoterol 4.5 MICROgram(s) Inhaler 2 Puff(s) Inhalation two times a day  cyanocobalamin 1000 MICROGram(s) Oral daily  dextrose 5%. 1000 milliLiter(s) (100 mL/Hr) IV Continuous <Continuous>  dextrose 5%. 1000 milliLiter(s) (50 mL/Hr) IV Continuous <Continuous>  dextrose 50% Injectable 25 Gram(s) IV Push once  dextrose 50% Injectable 12.5 Gram(s) IV Push once  dextrose 50% Injectable 25 Gram(s) IV Push once  glucagon  Injectable 1 milliGRAM(s) IntraMuscular once  insulin glargine Injectable (LANTUS) 8 Unit(s) SubCutaneous at bedtime  insulin lispro (ADMELOG) corrective regimen sliding scale   SubCutaneous three times a day before meals  levETIRAcetam  Solution 750 milliGRAM(s) Oral two times a day  methylPREDNISolone sodium succinate Injectable 40 milliGRAM(s) IV Push every 8 hours  metoprolol tartrate 25 milliGRAM(s) Oral two times a day  senna 2 Tablet(s) Oral at bedtime  tamsulosin 0.4 milliGRAM(s) Oral at bedtime  valproic  acid Syrup 750 milliGRAM(s) Oral daily  valproic  acid Syrup 1000 milliGRAM(s) Oral at bedtime    MEDICATIONS  (PRN):  acetaminophen     Tablet .. 650 milliGRAM(s) Oral every 6 hours PRN Temp greater or equal to 38C (100.4F), Mild Pain (1 - 3)  albuterol    90 MICROgram(s) HFA Inhaler 2 Puff(s) Inhalation every 6 hours PRN Shortness of Breath and/or Wheezing  aluminum hydroxide/magnesium hydroxide/simethicone Suspension 30 milliLiter(s) Oral every 4 hours PRN Dyspepsia  dextrose Oral Gel 15 Gram(s) Oral once PRN Blood Glucose LESS THAN 70 milliGRAM(s)/deciliter  melatonin 3 milliGRAM(s) Oral at bedtime PRN Insomnia  nitroglycerin     SubLingual 0.4 milliGRAM(s) SubLingual every 5 minutes PRN Chest Pain  ondansetron Injectable 4 milliGRAM(s) IV Push every 8 hours PRN Nausea and/or Vomiting  
CC: Patient is a 72y old  Male who presents with a chief complaint of Chest pain (06 Apr 2023 12:27)      Patient seen and examined at bedside, No acute overnight events. Denies any acute chest pain  ROS: Denies fever, nausea, vomiting, chest pain, SOB, abdominal pain, diarrhea and constipation    Vital Sign  Vital Signs Last 24 Hrs  T(C): 36.2 (06 Apr 2023 10:33), Max: 36.7 (05 Apr 2023 16:23)  T(F): 97.2 (06 Apr 2023 10:33), Max: 98.1 (05 Apr 2023 16:23)  HR: 82 (06 Apr 2023 11:45) (77 - 103)  BP: 134/84 (06 Apr 2023 10:33) (123/79 - 134/84)  BP(mean): --  RR: 18 (06 Apr 2023 10:33) (18 - 18)  SpO2: 98% (06 Apr 2023 11:45) (95% - 98%)    Parameters below as of 06 Apr 2023 11:45  Patient On (Oxygen Delivery Method): room air        Physical Exam:   Gen: NAD  HEENT: NCAT, EOMI, PERRLA, Pupils_  CVS: RRR, +S1/S2, no murmurs, rubs or gallops appreciated  Lungs: CTAB, no wheeze, rales, rhonchi  Abdomen: +BS, soft, ND, NT. no palpable flank tenderness or mass, no CVA tenderness  Ext: No cyanosis, edema or calf tenderness  Neuro: AAOx3, no focal deficits.    Labs:                        11.0   7.90  )-----------( 173      ( 06 Apr 2023 08:00 )             33.8   04-06    141  |  105  |  34<H>  ----------------------------<  172<H>  3.3<L>   |  33<H>  |  0.95    Ca    8.4<L>      06 Apr 2023 08:00  Mg     2.2     04-06    TPro  6.7  /  Alb  2.5<L>  /  TBili  0.2  /  DBili  x   /  AST  25  /  ALT  24  /  AlkPhos  59  04-04        Radiology:  *Pull    Medications:  MEDICATIONS  (STANDING):  albuterol/ipratropium for Nebulization 3 milliLiter(s) Nebulizer every 6 hours  apixaban 5 milliGRAM(s) Oral every 12 hours  atorvastatin 80 milliGRAM(s) Oral at bedtime  budesonide 160 MICROgram(s)/formoterol 4.5 MICROgram(s) Inhaler 2 Puff(s) Inhalation two times a day  cyanocobalamin 1000 MICROGram(s) Oral daily  dextrose 5%. 1000 milliLiter(s) (100 mL/Hr) IV Continuous <Continuous>  dextrose 5%. 1000 milliLiter(s) (50 mL/Hr) IV Continuous <Continuous>  dextrose 50% Injectable 25 Gram(s) IV Push once  dextrose 50% Injectable 12.5 Gram(s) IV Push once  dextrose 50% Injectable 25 Gram(s) IV Push once  glucagon  Injectable 1 milliGRAM(s) IntraMuscular once  insulin glargine Injectable (LANTUS) 8 Unit(s) SubCutaneous at bedtime  insulin lispro (ADMELOG) corrective regimen sliding scale   SubCutaneous three times a day before meals  levETIRAcetam  Solution 750 milliGRAM(s) Oral two times a day  metoprolol tartrate 25 milliGRAM(s) Oral two times a day  senna 2 Tablet(s) Oral at bedtime  tamsulosin 0.4 milliGRAM(s) Oral at bedtime  valproic  acid Syrup 750 milliGRAM(s) Oral daily  valproic  acid Syrup 1000 milliGRAM(s) Oral at bedtime    MEDICATIONS  (PRN):  acetaminophen     Tablet .. 650 milliGRAM(s) Oral every 6 hours PRN Temp greater or equal to 38C (100.4F), Mild Pain (1 - 3)  albuterol    90 MICROgram(s) HFA Inhaler 2 Puff(s) Inhalation every 6 hours PRN Shortness of Breath and/or Wheezing  aluminum hydroxide/magnesium hydroxide/simethicone Suspension 30 milliLiter(s) Oral every 4 hours PRN Dyspepsia  dextrose Oral Gel 15 Gram(s) Oral once PRN Blood Glucose LESS THAN 70 milliGRAM(s)/deciliter  melatonin 3 milliGRAM(s) Oral at bedtime PRN Insomnia  nitroglycerin     SubLingual 0.4 milliGRAM(s) SubLingual every 5 minutes PRN Chest Pain  ondansetron Injectable 4 milliGRAM(s) IV Push every 8 hours PRN Nausea and/or Vomiting

## 2023-04-07 ENCOUNTER — TRANSCRIPTION ENCOUNTER (OUTPATIENT)
Age: 73
End: 2023-04-07

## 2023-04-07 VITALS — OXYGEN SATURATION: 96 %

## 2023-04-07 LAB
ANION GAP SERPL CALC-SCNC: 5 MMOL/L — SIGNIFICANT CHANGE UP (ref 5–17)
BUN SERPL-MCNC: 23 MG/DL — SIGNIFICANT CHANGE UP (ref 7–23)
CALCIUM SERPL-MCNC: 8.5 MG/DL — SIGNIFICANT CHANGE UP (ref 8.5–10.1)
CHLORIDE SERPL-SCNC: 104 MMOL/L — SIGNIFICANT CHANGE UP (ref 96–108)
CO2 SERPL-SCNC: 31 MMOL/L — SIGNIFICANT CHANGE UP (ref 22–31)
CREAT SERPL-MCNC: 0.88 MG/DL — SIGNIFICANT CHANGE UP (ref 0.5–1.3)
EGFR: 90 ML/MIN/1.73M2 — SIGNIFICANT CHANGE UP
GLUCOSE BLDC GLUCOMTR-MCNC: 114 MG/DL — HIGH (ref 70–99)
GLUCOSE BLDC GLUCOMTR-MCNC: 201 MG/DL — HIGH (ref 70–99)
GLUCOSE BLDC GLUCOMTR-MCNC: 96 MG/DL — SIGNIFICANT CHANGE UP (ref 70–99)
GLUCOSE SERPL-MCNC: 101 MG/DL — HIGH (ref 70–99)
HCT VFR BLD CALC: 35.9 % — LOW (ref 39–50)
HGB BLD-MCNC: 11.5 G/DL — LOW (ref 13–17)
MCHC RBC-ENTMCNC: 30 PG — SIGNIFICANT CHANGE UP (ref 27–34)
MCHC RBC-ENTMCNC: 32 G/DL — SIGNIFICANT CHANGE UP (ref 32–36)
MCV RBC AUTO: 93.7 FL — SIGNIFICANT CHANGE UP (ref 80–100)
NRBC # BLD: 0 /100 WBCS — SIGNIFICANT CHANGE UP (ref 0–0)
PLATELET # BLD AUTO: 205 K/UL — SIGNIFICANT CHANGE UP (ref 150–400)
POTASSIUM SERPL-MCNC: 3.9 MMOL/L — SIGNIFICANT CHANGE UP (ref 3.5–5.3)
POTASSIUM SERPL-SCNC: 3.9 MMOL/L — SIGNIFICANT CHANGE UP (ref 3.5–5.3)
RBC # BLD: 3.83 M/UL — LOW (ref 4.2–5.8)
RBC # FLD: 16.6 % — HIGH (ref 10.3–14.5)
SODIUM SERPL-SCNC: 140 MMOL/L — SIGNIFICANT CHANGE UP (ref 135–145)
WBC # BLD: 8.24 K/UL — SIGNIFICANT CHANGE UP (ref 3.8–10.5)
WBC # FLD AUTO: 8.24 K/UL — SIGNIFICANT CHANGE UP (ref 3.8–10.5)

## 2023-04-07 PROCEDURE — 99223 1ST HOSP IP/OBS HIGH 75: CPT

## 2023-04-07 PROCEDURE — 99232 SBSQ HOSP IP/OBS MODERATE 35: CPT

## 2023-04-07 RX ADMIN — Medication 25 MILLIGRAM(S): at 17:14

## 2023-04-07 RX ADMIN — Medication 3 MILLILITER(S): at 11:09

## 2023-04-07 RX ADMIN — BUDESONIDE AND FORMOTEROL FUMARATE DIHYDRATE 2 PUFF(S): 160; 4.5 AEROSOL RESPIRATORY (INHALATION) at 05:38

## 2023-04-07 RX ADMIN — Medication 750 MILLIGRAM(S): at 13:54

## 2023-04-07 RX ADMIN — Medication 4: at 17:14

## 2023-04-07 RX ADMIN — Medication 3 MILLILITER(S): at 17:32

## 2023-04-07 RX ADMIN — Medication 3 MILLIGRAM(S): at 13:54

## 2023-04-07 RX ADMIN — PREGABALIN 1000 MICROGRAM(S): 225 CAPSULE ORAL at 13:54

## 2023-04-07 RX ADMIN — APIXABAN 5 MILLIGRAM(S): 2.5 TABLET, FILM COATED ORAL at 17:14

## 2023-04-07 RX ADMIN — Medication 25 MILLIGRAM(S): at 05:39

## 2023-04-07 RX ADMIN — LEVETIRACETAM 750 MILLIGRAM(S): 250 TABLET, FILM COATED ORAL at 17:15

## 2023-04-07 RX ADMIN — APIXABAN 5 MILLIGRAM(S): 2.5 TABLET, FILM COATED ORAL at 05:39

## 2023-04-07 RX ADMIN — BUDESONIDE AND FORMOTEROL FUMARATE DIHYDRATE 2 PUFF(S): 160; 4.5 AEROSOL RESPIRATORY (INHALATION) at 17:16

## 2023-04-07 RX ADMIN — Medication 200 MILLIGRAM(S): at 05:39

## 2023-04-07 RX ADMIN — LEVETIRACETAM 750 MILLIGRAM(S): 250 TABLET, FILM COATED ORAL at 05:39

## 2023-04-07 NOTE — CONSULT NOTE ADULT - SUBJECTIVE AND OBJECTIVE BOX
Cardiology Initial Consult    [facility] - Cardiology at [place]  [address] [place] [address], [place] [address]  Office: [phone]  Fax: [phone]    CHIEF COMPLAINT:      HISTORY OF PRESENT ILLNESS:  72M HTN, HLD, DM, COPD, seizures, hx of L subclavian thrombosis, CAD s/p CABG 2014, GUILLE 2019, chronic HFrEF, CHB s/p PPM, hx of cardiac arrest thought 2/2 hypoglycemia with protracted course s/p trach/PEG who presented with chest pain similar to his prior anginal symptoms.        Allergies  No Known Allergies  Intolerances	    MEDICATIONS:  apixaban 5 milliGRAM(s) Oral every 12 hours  metoprolol tartrate 25 milliGRAM(s) Oral two times a day  nitroglycerin     SubLingual 0.4 milliGRAM(s) SubLingual every 5 minutes PRN  albuterol    90 MICROgram(s) HFA Inhaler 2 Puff(s) Inhalation every 6 hours PRN  albuterol/ipratropium for Nebulization 3 milliLiter(s) Nebulizer every 6 hours  budesonide 160 MICROgram(s)/formoterol 4.5 MICROgram(s) Inhaler 2 Puff(s) Inhalation two times a day  guaiFENesin Oral Liquid (Sugar-Free) 200 milliGRAM(s) Oral every 6 hours PRN  acetaminophen     Tablet .. 650 milliGRAM(s) Oral every 6 hours PRN  levETIRAcetam  Solution 750 milliGRAM(s) Oral two times a day  melatonin 3 milliGRAM(s) Oral at bedtime PRN  ondansetron Injectable 4 milliGRAM(s) IV Push every 8 hours PRN  valproic  acid Syrup 750 milliGRAM(s) Oral daily  valproic  acid Syrup 1000 milliGRAM(s) Oral at bedtime  aluminum hydroxide/magnesium hydroxide/simethicone Suspension 30 milliLiter(s) Oral every 4 hours PRN  senna 2 Tablet(s) Oral at bedtime  atorvastatin 80 milliGRAM(s) Oral at bedtime  dextrose 50% Injectable 25 Gram(s) IV Push once  dextrose 50% Injectable 12.5 Gram(s) IV Push once  dextrose 50% Injectable 25 Gram(s) IV Push once  dextrose Oral Gel 15 Gram(s) Oral once PRN  glucagon  Injectable 1 milliGRAM(s) IntraMuscular once  insulin glargine Injectable (LANTUS) 8 Unit(s) SubCutaneous at bedtime  insulin lispro (ADMELOG) corrective regimen sliding scale   SubCutaneous three times a day before meals  cyanocobalamin 1000 MICROGram(s) Oral daily  dextrose 5%. 1000 milliLiter(s) IV Continuous <Continuous>  dextrose 5%. 1000 milliLiter(s) IV Continuous <Continuous>  tamsulosin 0.4 milliGRAM(s) Oral at bedtime      PAST MEDICAL & SURGICAL HISTORY:  HTN (hypertension)  HLD (hyperlipidemia)  Diabetes mellitus  Lacunar infarction  BPH (benign prostatic hyperplasia)  CHF (congestive heart failure)  Chronic obstructive pulmonary disease, unspecified COPD type  S/P CABG (coronary artery bypass graft)  2014    FAMILY HISTORY:      SOCIAL HISTORY:    [x] Non-smoker  [ ] Smoker  [ ] Alcohol    Review of Systems:  Constitutional: [ ] Fever [ ] Chills [ ] Fatigue [ ] Weight change   HEENT: [ ] Blurred vision [ ] Eye pain [ ] Headache [ ] Runny nose [ ] Sore throat   Respiratory: [ ] Cough [ ] Wheezing [ ] Shortness of breath  Cardiovascular: [ ] Chest Pain [ ] Palpitations [ ] ALBERT [ ] PND [ ] Orthopnea  Gastrointestinal: [ ] Abdominal Pain [ ] Diarrhea [ ] Constipation [ ] Hemorrhoids [ ] Nausea [ ] Vomiting  Genitourinary: [ ] Nocturia [ ] Dysuria [ ] Incontinence  Extremities: [ ] Swelling [ ] Joint Pain  Neurologic: [ ] Focal deficit [ ] Paresthesias [ ] Syncope  Skin: [ ] Rash [ ] Ecchymoses [ ] Wounds [ ] Lesions  Psychiatry: [ ] Depression [ ] Suicidal/Homicidal ideation [ ] Anxiety [ ] Sleep disturbances  [ ] 10 point review of systems is otherwise negative except as mentioned above            [ ]Unable to obtain    PHYSICAL EXAM:  T(C): 36.7 (04-07-23 @ 04:38), Max: 37.4 (04-06-23 @ 23:36)  HR: 83 (04-07-23 @ 04:38) (82 - 98)  BP: 152/80 (04-07-23 @ 04:38) (121/76 - 152/80)  RR: 18 (04-07-23 @ 04:38) (18 - 18)  SpO2: 98% (04-06-23 @ 23:36) (95% - 98%)  Wt(kg): --  I&O's Summary    06 Apr 2023 07:01  -  07 Apr 2023 07:00  --------------------------------------------------------  IN: 0 mL / OUT: 201 mL / NET: -201 mL        Appearance: No acute distress  HEENT:   Normal oral mucosa, PERRL  Cardiovascular: Normal S1 S2, no elevated JVP, no murmurs, no edema  Respiratory: Lungs clear to auscultation	, good air movement  Psychiatry: A & O x 3, Mood & affect appropriate  Gastrointestinal:  soft nt nd normoactive bs	  Skin: No rashes, no ecchymoses, no cyanosis	  Neurologic: grossly non-focal  Extremities: Normal range of motion, no clubbing, cyanosis or edema  Vascular: Peripheral pulses palpable bilaterally    LABS:	 	  CBC Full  -  ( 06 Apr 2023 08:00 )  WBC Count : 7.90 K/uL  Hemoglobin : 11.0 g/dL  Hematocrit : 33.8 %  Platelet Count - Automated : 173 K/uL    04-06  137  |  105  |  35<H>  ----------------------------<  228<H>  4.5   |  28  |  1.31<H>    04-06  141  |  105  |  34<H>  ----------------------------<  172<H>  3.3<L>   |  33<H>  |  0.95    Ca    8.3<L>      06 Apr 2023 16:25  Ca    8.4<L>      06 Apr 2023 08:00  Mg     1.9     04-06  Mg     2.2     04-06    proBNP:   Lipid Profile:   HgA1c:   TSH:     CARDIAC MARKERS:  Troponin I, High Sensitivity Result: 25.4 ng/L (04-06-23 @ 08:00)  Troponin I, High Sensitivity Result: 18.1 ng/L (04-05-23 @ 15:20)    TELEMETRY: 	    ECG:  	  RADIOLOGY:  OTHER: 	    PREVIOUS DIAGNOSTIC TESTING:    [x] Echocardiogram: < from: TTE Echo Complete w/o Contrast w/ Doppler (11.14.22 @ 09:22) >  Summary:   1. Moderately decreased segmental left ventricular systolic function.   2. Normal global left ventricular systolic function.   3. Apical lateral segment, apical anterior segment, and apex are   abnormal as described above.   4. Increased LV wall thickness.   5. Takotsubo cardiomyopathy.   6. There is mild concentric left ventricular hypertrophy.   7. Degenerative mitral valve.   8. Mild mitral annular calcification.   9. Mild mitral valve regurgitation.  10. Mild thickening and calcification of the anterior and posterior   mitral valve leaflets.  11. Estimated pulmonary artery systolic pressure is 42.3 mmHg assuming a   right atrial pressure of 5 mmHg, which is consistent with mild pulmonary   hypertension.    < end of copied text >    [x] Catheterization:< from: Cardiac Cath Lab - Adult (06.15.20 @ 14:42) >  CORONARY VESSELS: The coronary circulation is right dominant.  LM:   --  Distal left main: There was a discrete 60 % stenosis. There was  JOSE grade 3 flow through the vessel (brisk flow).  LAD:   --  Proximal LAD: There was a 100 % stenosis just before D1. There  was JOSE grade 0 flow through the vessel (no flow).  --  D1: There was a discrete 70 % stenosis distal to the graft anastomosis.  There was JOSE grade 3 flow through the vessel (brisk flow) and a small  vascular territory distal to the lesion.  --  D2: The vessel was very small sized.  CX:   --  Ostial circumflex: There was a discrete 40 % stenosis.  --  Mid circumflex: There was a tubular 80 % stenosis. There was JOSE grade  3 flow through the vessel (brisk flow) and a small vascular territory  distal to the lesion.  RI:   -- Ramus intermedius: Angiography showed minor luminal  irregularities with no flow limiting lesions.  RCA:   --  Proximal RCA: There was a tubular 50 % stenosis. There was JOSE  grade 3 flow through the vessel (brisk flow).  --  Mid RCA: There was a discrete 40 % stenosis in the distal third of the  vessel segment.  --  RPDA: There was a discrete 20 % stenosis at the ostium of the vessel  segment.  --  RPL1: The vessel was very small sized.  --  RPL2: The distal vessel was supplied by collaterals from the third  right posterolateral. There was a 100 % stenosis at the ostium of the  vessel segment. There was JOSE grade 0 flow through the vessel (no flow).  --  RPL3: There was a tubular 60 % stenosis in the proximal third of the  vessel segment.There was JOSE grade 3 flow through the vessel (brisk  flow).  GRAFTS:   --  Graft to the mid LAD: The graft was a LIMA. It was normal.  --  Graft to the 1st diagonal: The graft was a saphenous vein graft from  the aorta. There was a discrete 30 % stenosis at the proximal anastomosis.  --  Graft to the 2nd diagonal: The graft was a saphenous vein graft from  the aorta. There was a discrete 80 % stenosis at the proximal anastomosis.  There was JOSE grade 3 flow through the graft (brisk flow). There was a  tubular 60 % stenosis in the proximal third of the graft. There was JOSE  grade 3 flow through the graft (brisk flow).    < end of copied text >    [ ] Stress Test:  	 Cardiology Initial Consult    [facility] - Cardiology at [place]  [address] [place] [address], [place] [address]  Office: [phone]  Fax: [phone]    CHIEF COMPLAINT:  chest pain    HISTORY OF PRESENT ILLNESS:  72M HTN, HLD, DM, COPD, seizures, hx of L subclavian thrombosis, CAD s/p CABG 2014, GUILLE 2019, chronic HFrEF, CHB s/p PPM, hx of cardiac arrest thought 2/2 hypoglycemia with protracted course s/p trach/PEG who presented with chest pain similar to his prior anginal symptoms.    Pt is difficult to get a good history, however reports his chest pain is not similar to his anginal pain. Reports his pain is L sided, still present, worse with pushing on pacemaker site.    Allergies  No Known Allergies  Intolerances	    MEDICATIONS:  apixaban 5 milliGRAM(s) Oral every 12 hours  metoprolol tartrate 25 milliGRAM(s) Oral two times a day  nitroglycerin     SubLingual 0.4 milliGRAM(s) SubLingual every 5 minutes PRN  albuterol    90 MICROgram(s) HFA Inhaler 2 Puff(s) Inhalation every 6 hours PRN  albuterol/ipratropium for Nebulization 3 milliLiter(s) Nebulizer every 6 hours  budesonide 160 MICROgram(s)/formoterol 4.5 MICROgram(s) Inhaler 2 Puff(s) Inhalation two times a day  guaiFENesin Oral Liquid (Sugar-Free) 200 milliGRAM(s) Oral every 6 hours PRN  acetaminophen     Tablet .. 650 milliGRAM(s) Oral every 6 hours PRN  levETIRAcetam  Solution 750 milliGRAM(s) Oral two times a day  melatonin 3 milliGRAM(s) Oral at bedtime PRN  ondansetron Injectable 4 milliGRAM(s) IV Push every 8 hours PRN  valproic  acid Syrup 750 milliGRAM(s) Oral daily  valproic  acid Syrup 1000 milliGRAM(s) Oral at bedtime  aluminum hydroxide/magnesium hydroxide/simethicone Suspension 30 milliLiter(s) Oral every 4 hours PRN  senna 2 Tablet(s) Oral at bedtime  atorvastatin 80 milliGRAM(s) Oral at bedtime  dextrose 50% Injectable 25 Gram(s) IV Push once  dextrose 50% Injectable 12.5 Gram(s) IV Push once  dextrose 50% Injectable 25 Gram(s) IV Push once  dextrose Oral Gel 15 Gram(s) Oral once PRN  glucagon  Injectable 1 milliGRAM(s) IntraMuscular once  insulin glargine Injectable (LANTUS) 8 Unit(s) SubCutaneous at bedtime  insulin lispro (ADMELOG) corrective regimen sliding scale   SubCutaneous three times a day before meals  cyanocobalamin 1000 MICROGram(s) Oral daily  dextrose 5%. 1000 milliLiter(s) IV Continuous <Continuous>  dextrose 5%. 1000 milliLiter(s) IV Continuous <Continuous>  tamsulosin 0.4 milliGRAM(s) Oral at bedtime    PAST MEDICAL & SURGICAL HISTORY:  HTN (hypertension)  HLD (hyperlipidemia)  Diabetes mellitus  Lacunar infarction  BPH (benign prostatic hyperplasia)  CHF (congestive heart failure)  Chronic obstructive pulmonary disease, unspecified COPD type  S/P CABG (coronary artery bypass graft)  2014    FAMILY HISTORY:    SOCIAL HISTORY:    [x] Non-smoker  [ ] Smoker  [ ] Alcohol    Review of Systems:  Unable to obtain except as above    PHYSICAL EXAM:  T(C): 36.7 (04-07-23 @ 04:38), Max: 37.4 (04-06-23 @ 23:36)  HR: 83 (04-07-23 @ 04:38) (82 - 98)  BP: 152/80 (04-07-23 @ 04:38) (121/76 - 152/80)  RR: 18 (04-07-23 @ 04:38) (18 - 18)  SpO2: 98% (04-06-23 @ 23:36) (95% - 98%)  Wt(kg): --  I&O's Summary    06 Apr 2023 07:01  -  07 Apr 2023 07:00  --------------------------------------------------------  IN: 0 mL / OUT: 201 mL / NET: -201 mL    Appearance: No acute distress  HEENT:   mmm  Cardiovascular: Normal S1 S2, no elevated JVP, no murmurs, no edema. Mild ttp over PPM site without erythema, fluctuance.  Respiratory: Lungs clear to auscultation	, good air movement  Psychiatry: Mood & affect appropriate  Gastrointestinal:  soft nt. PEG present  Skin:  no cyanosis	  Neurologic: jerking motions noted. +dysarthria  Vascular: Peripheral pulses palpable bilaterally    LABS:	 	  CBC Full  -  ( 06 Apr 2023 08:00 )  WBC Count : 7.90 K/uL  Hemoglobin : 11.0 g/dL  Hematocrit : 33.8 %  Platelet Count - Automated : 173 K/uL    04-06  137  |  105  |  35<H>  ----------------------------<  228<H>  4.5   |  28  |  1.31<H>    04-06  141  |  105  |  34<H>  ----------------------------<  172<H>  3.3<L>   |  33<H>  |  0.95    Ca    8.3<L>      06 Apr 2023 16:25  Ca    8.4<L>      06 Apr 2023 08:00  Mg     1.9     04-06  Mg     2.2     04-06    proBNP:   Lipid Profile:   HgA1c:   TSH:     CARDIAC MARKERS:  Troponin I, High Sensitivity Result: 25.4 ng/L (04-06-23 @ 08:00)  Troponin I, High Sensitivity Result: 18.1 ng/L (04-05-23 @ 15:20)    TELEMETRY: 	  As-, brief NSVT  ECG:  AsVp	  RADIOLOGY:  OTHER: 	    PREVIOUS DIAGNOSTIC TESTING:    [x] Echocardiogram: < from: TTE Echo Complete w/o Contrast w/ Doppler (11.14.22 @ 09:22) >  Summary:   1. Moderately decreased segmental left ventricular systolic function.   2. Normal global left ventricular systolic function.   3. Apical lateral segment, apical anterior segment, and apex are   abnormal as described above.   4. Increased LV wall thickness.   5. Takotsubo cardiomyopathy.   6. There is mild concentric left ventricular hypertrophy.   7. Degenerative mitral valve.   8. Mild mitral annular calcification.   9. Mild mitral valve regurgitation.  10. Mild thickening and calcification of the anterior and posterior   mitral valve leaflets.  11. Estimated pulmonary artery systolic pressure is 42.3 mmHg assuming a   right atrial pressure of 5 mmHg, which is consistent with mild pulmonary   hypertension.    < end of copied text >    [x] Catheterization:< from: Cardiac Cath Lab - Adult (06.15.20 @ 14:42) >  CORONARY VESSELS: The coronary circulation is right dominant.  LM:   --  Distal left main: There was a discrete 60 % stenosis. There was  JOSE grade 3 flow through the vessel (brisk flow).  LAD:   --  Proximal LAD: There was a 100 % stenosis just before D1. There  was JOSE grade 0 flow through the vessel (no flow).  --  D1: There was a discrete 70 % stenosis distal to the graft anastomosis.  There was JOSE grade 3 flow through the vessel (brisk flow) and a small  vascular territory distal to the lesion.  --  D2: The vessel was very small sized.  CX:   --  Ostial circumflex: There was a discrete 40 % stenosis.  --  Mid circumflex: There was a tubular 80 % stenosis. There was JOSE grade  3 flow through the vessel (brisk flow) and a small vascular territory  distal to the lesion.  RI:   -- Ramus intermedius: Angiography showed minor luminal  irregularities with no flow limiting lesions.  RCA:   --  Proximal RCA: There was a tubular 50 % stenosis. There was JOSE  grade 3 flow through the vessel (brisk flow).  --  Mid RCA: There was a discrete 40 % stenosis in the distal third of the  vessel segment.  --  RPDA: There was a discrete 20 % stenosis at the ostium of the vessel  segment.  --  RPL1: The vessel was very small sized.  --  RPL2: The distal vessel was supplied by collaterals from the third  right posterolateral. There was a 100 % stenosis at the ostium of the  vessel segment. There was JOSE grade 0 flow through the vessel (no flow).  --  RPL3: There was a tubular 60 % stenosis in the proximal third of the  vessel segment.There was JOSE grade 3 flow through the vessel (brisk  flow).  GRAFTS:   --  Graft to the mid LAD: The graft was a LIMA. It was normal.  --  Graft to the 1st diagonal: The graft was a saphenous vein graft from  the aorta. There was a discrete 30 % stenosis at the proximal anastomosis.  --  Graft to the 2nd diagonal: The graft was a saphenous vein graft from  the aorta. There was a discrete 80 % stenosis at the proximal anastomosis.  There was JOSE grade 3 flow through the graft (brisk flow). There was a  tubular 60 % stenosis in the proximal third of the graft. There was JOSE  grade 3 flow through the graft (brisk flow).    < end of copied text >    [ ] Stress Test:

## 2023-04-07 NOTE — DISCHARGE NOTE NURSING/CASE MANAGEMENT/SOCIAL WORK - NSDCPEFALRISK_GEN_ALL_CORE
For information on Fall & Injury Prevention, visit: [email or web address] OR  [email or web address] OR  https://www.cdc.gov/steadi/patient.html

## 2023-04-07 NOTE — PHYSICAL THERAPY INITIAL EVALUATION ADULT - ADDITIONAL COMMENTS
As per previous PT Eval: Patient reports he lives with his son in an apartment, 15 steps to apartment. Patient reports he was previously independent in all ADLs and did not use an assistive device for ambulation.  Pt most recently from sub acute rehab where he has been progressing with strengthening and balance activities. Pt has begun to tolerate static standing for 5-10 minutes.

## 2023-04-07 NOTE — CONSULT NOTE ADULT - ASSESSMENT
72M HTN, HLD, DM, COPD, seizures, hx of L subclavian thrombosis, CAD s/p CABG 2014, GUILLE 2019, chronic HFrEF, CHB s/p PPM, hx of cardiac arrest thought 2/2 hypoglycemia with protracted course s/p trach/PEG who presented with chest pain.    Upon further evaluation appears non-cardiac.   No clear evidence of pacemaker pocket infection.  No leukocytosis.  Grossly euvolemic  trop neg x 3    Given overall functional status and low suspicion for CAD will defer any further cardiac work-up at this time.

## 2023-04-07 NOTE — PHYSICAL THERAPY INITIAL EVALUATION ADULT - PERTINENT HX OF CURRENT PROBLEM, REHAB EVAL
73 y/o  male with PMH of HTN, HLD, DM II, COPD, Seizure, hx of  left subclavian thrombosis , CAD (CABG 2014/ s/p  x 2-3 stents last one 2019), chronic angina,  systolic CHF, heart block s/p PPM and hx of Cardiac arrest w/ intubation and trach with residual PEG tube presents from [facility] for Chest pain. Son at the bedside to collaborate history. Chest pain described as Left sided  that started around 8pm and is similar to pain experience with Angina. Pt also admits to wheezing and some dry cough. Pt denies SOB, Palpitations, dizziness or leg swelling. No recent fevers, N/V/D, abdominal pain or dysuria.

## 2023-04-07 NOTE — DISCHARGE NOTE NURSING/CASE MANAGEMENT/SOCIAL WORK - PATIENT PORTAL LINK FT
You can access the FollowMyHealth Patient Portal offered by [facility] by registering at the following website: [email or web address]. By joining [employer]’s FollowMyHealth portal, you will also be able to view your health information using other applications (apps) compatible with our system.

## 2023-04-12 DIAGNOSIS — Z86.73 PERSONAL HISTORY OF TRANSIENT ISCHEMIC ATTACK (TIA), AND CEREBRAL INFARCTION WITHOUT RESIDUAL DEFICITS: ICD-10-CM

## 2023-04-12 DIAGNOSIS — G40.909 EPILEPSY, UNSPECIFIED, NOT INTRACTABLE, WITHOUT STATUS EPILEPTICUS: ICD-10-CM

## 2023-04-12 DIAGNOSIS — I82.B22 CHRONIC EMBOLISM AND THROMBOSIS OF LEFT SUBCLAVIAN VEIN: ICD-10-CM

## 2023-04-12 DIAGNOSIS — Z95.1 PRESENCE OF AORTOCORONARY BYPASS GRAFT: ICD-10-CM

## 2023-04-12 DIAGNOSIS — R07.9 CHEST PAIN, UNSPECIFIED: ICD-10-CM

## 2023-04-12 DIAGNOSIS — N40.0 BENIGN PROSTATIC HYPERPLASIA WITHOUT LOWER URINARY TRACT SYMPTOMS: ICD-10-CM

## 2023-04-12 DIAGNOSIS — Z95.5 PRESENCE OF CORONARY ANGIOPLASTY IMPLANT AND GRAFT: ICD-10-CM

## 2023-04-12 DIAGNOSIS — E78.5 HYPERLIPIDEMIA, UNSPECIFIED: ICD-10-CM

## 2023-04-12 DIAGNOSIS — Z95.0 PRESENCE OF CARDIAC PACEMAKER: ICD-10-CM

## 2023-04-12 DIAGNOSIS — I50.22 CHRONIC SYSTOLIC (CONGESTIVE) HEART FAILURE: ICD-10-CM

## 2023-04-12 DIAGNOSIS — Z79.4 LONG TERM (CURRENT) USE OF INSULIN: ICD-10-CM

## 2023-04-12 DIAGNOSIS — Z79.82 LONG TERM (CURRENT) USE OF ASPIRIN: ICD-10-CM

## 2023-04-12 DIAGNOSIS — J44.9 CHRONIC OBSTRUCTIVE PULMONARY DISEASE, UNSPECIFIED: ICD-10-CM

## 2023-04-12 DIAGNOSIS — E11.9 TYPE 2 DIABETES MELLITUS WITHOUT COMPLICATIONS: ICD-10-CM

## 2023-04-12 DIAGNOSIS — I11.0 HYPERTENSIVE HEART DISEASE WITH HEART FAILURE: ICD-10-CM

## 2023-04-12 DIAGNOSIS — Z86.74 PERSONAL HISTORY OF SUDDEN CARDIAC ARREST: ICD-10-CM

## 2023-04-12 DIAGNOSIS — I25.10 ATHEROSCLEROTIC HEART DISEASE OF NATIVE CORONARY ARTERY WITHOUT ANGINA PECTORIS: ICD-10-CM

## 2023-06-27 ENCOUNTER — APPOINTMENT (OUTPATIENT)
Dept: NEUROLOGY | Facility: CLINIC | Age: 73
End: 2023-06-27
Payer: MEDICAID

## 2023-06-27 PROCEDURE — 99204 OFFICE O/P NEW MOD 45 MIN: CPT | Mod: 95

## 2023-06-29 ENCOUNTER — EMERGENCY (EMERGENCY)
Facility: HOSPITAL | Age: 73
LOS: 0 days | Discharge: HOME | End: 2023-06-29
Attending: STUDENT IN AN ORGANIZED HEALTH CARE EDUCATION/TRAINING PROGRAM
Payer: MEDICAID

## 2023-06-29 VITALS
HEIGHT: 67 IN | HEART RATE: 78 BPM | RESPIRATION RATE: 18 BRPM | OXYGEN SATURATION: 99 % | SYSTOLIC BLOOD PRESSURE: 157 MMHG | TEMPERATURE: 98 F | WEIGHT: 162.26 LBS | DIASTOLIC BLOOD PRESSURE: 78 MMHG

## 2023-06-29 VITALS
DIASTOLIC BLOOD PRESSURE: 68 MMHG | RESPIRATION RATE: 18 BRPM | HEART RATE: 76 BPM | OXYGEN SATURATION: 96 % | TEMPERATURE: 99 F | SYSTOLIC BLOOD PRESSURE: 184 MMHG

## 2023-06-29 DIAGNOSIS — Z87.438 PERSONAL HISTORY OF OTHER DISEASES OF MALE GENITAL ORGANS: ICD-10-CM

## 2023-06-29 DIAGNOSIS — R25.1 TREMOR, UNSPECIFIED: ICD-10-CM

## 2023-06-29 DIAGNOSIS — Z95.1 PRESENCE OF AORTOCORONARY BYPASS GRAFT: ICD-10-CM

## 2023-06-29 DIAGNOSIS — E11.9 TYPE 2 DIABETES MELLITUS WITHOUT COMPLICATIONS: ICD-10-CM

## 2023-06-29 DIAGNOSIS — Z79.82 LONG TERM (CURRENT) USE OF ASPIRIN: ICD-10-CM

## 2023-06-29 DIAGNOSIS — I50.9 HEART FAILURE, UNSPECIFIED: ICD-10-CM

## 2023-06-29 DIAGNOSIS — I11.0 HYPERTENSIVE HEART DISEASE WITH HEART FAILURE: ICD-10-CM

## 2023-06-29 DIAGNOSIS — Z79.01 LONG TERM (CURRENT) USE OF ANTICOAGULANTS: ICD-10-CM

## 2023-06-29 DIAGNOSIS — Z95.1 PRESENCE OF AORTOCORONARY BYPASS GRAFT: Chronic | ICD-10-CM

## 2023-06-29 DIAGNOSIS — Z79.4 LONG TERM (CURRENT) USE OF INSULIN: ICD-10-CM

## 2023-06-29 DIAGNOSIS — J44.9 CHRONIC OBSTRUCTIVE PULMONARY DISEASE, UNSPECIFIED: ICD-10-CM

## 2023-06-29 DIAGNOSIS — E78.5 HYPERLIPIDEMIA, UNSPECIFIED: ICD-10-CM

## 2023-06-29 LAB
ALBUMIN SERPL ELPH-MCNC: 2.7 G/DL — LOW (ref 3.3–5)
ALP SERPL-CCNC: 98 U/L — SIGNIFICANT CHANGE UP (ref 40–120)
ALT FLD-CCNC: 37 U/L — SIGNIFICANT CHANGE UP (ref 12–78)
ANION GAP SERPL CALC-SCNC: 3 MMOL/L — LOW (ref 5–17)
APPEARANCE UR: ABNORMAL
AST SERPL-CCNC: 35 U/L — SIGNIFICANT CHANGE UP (ref 15–37)
BACTERIA # UR AUTO: ABNORMAL
BASOPHILS # BLD AUTO: 0.08 K/UL — SIGNIFICANT CHANGE UP (ref 0–0.2)
BASOPHILS NFR BLD AUTO: 0.6 % — SIGNIFICANT CHANGE UP (ref 0–2)
BILIRUB SERPL-MCNC: 0.3 MG/DL — SIGNIFICANT CHANGE UP (ref 0.2–1.2)
BILIRUB UR-MCNC: NEGATIVE — SIGNIFICANT CHANGE UP
BUN SERPL-MCNC: 26 MG/DL — HIGH (ref 7–23)
CALCIUM SERPL-MCNC: 8.5 MG/DL — SIGNIFICANT CHANGE UP (ref 8.5–10.1)
CHLORIDE SERPL-SCNC: 104 MMOL/L — SIGNIFICANT CHANGE UP (ref 96–108)
CK SERPL-CCNC: 149 U/L — SIGNIFICANT CHANGE UP (ref 26–308)
CO2 SERPL-SCNC: 34 MMOL/L — HIGH (ref 22–31)
COLOR SPEC: YELLOW — SIGNIFICANT CHANGE UP
CREAT SERPL-MCNC: 0.81 MG/DL — SIGNIFICANT CHANGE UP (ref 0.5–1.3)
DIFF PNL FLD: NEGATIVE — SIGNIFICANT CHANGE UP
EGFR: 93 ML/MIN/1.73M2 — SIGNIFICANT CHANGE UP
EOSINOPHIL # BLD AUTO: 0.66 K/UL — HIGH (ref 0–0.5)
EOSINOPHIL NFR BLD AUTO: 5.3 % — SIGNIFICANT CHANGE UP (ref 0–6)
EPI CELLS # UR: NEGATIVE — SIGNIFICANT CHANGE UP
GLUCOSE SERPL-MCNC: 82 MG/DL — SIGNIFICANT CHANGE UP (ref 70–99)
GLUCOSE UR QL: NEGATIVE MG/DL — SIGNIFICANT CHANGE UP
HCT VFR BLD CALC: 39.7 % — SIGNIFICANT CHANGE UP (ref 39–50)
HGB BLD-MCNC: 13 G/DL — SIGNIFICANT CHANGE UP (ref 13–17)
IMM GRANULOCYTES NFR BLD AUTO: 0.3 % — SIGNIFICANT CHANGE UP (ref 0–0.9)
KETONES UR-MCNC: ABNORMAL
LEUKOCYTE ESTERASE UR-ACNC: NEGATIVE — SIGNIFICANT CHANGE UP
LYMPHOCYTES # BLD AUTO: 2.68 K/UL — SIGNIFICANT CHANGE UP (ref 1–3.3)
LYMPHOCYTES # BLD AUTO: 21.4 % — SIGNIFICANT CHANGE UP (ref 13–44)
MCHC RBC-ENTMCNC: 30.1 PG — SIGNIFICANT CHANGE UP (ref 27–34)
MCHC RBC-ENTMCNC: 32.7 G/DL — SIGNIFICANT CHANGE UP (ref 32–36)
MCV RBC AUTO: 91.9 FL — SIGNIFICANT CHANGE UP (ref 80–100)
MONOCYTES # BLD AUTO: 1.08 K/UL — HIGH (ref 0–0.9)
MONOCYTES NFR BLD AUTO: 8.6 % — SIGNIFICANT CHANGE UP (ref 2–14)
NEUTROPHILS # BLD AUTO: 8 K/UL — HIGH (ref 1.8–7.4)
NEUTROPHILS NFR BLD AUTO: 63.8 % — SIGNIFICANT CHANGE UP (ref 43–77)
NITRITE UR-MCNC: NEGATIVE — SIGNIFICANT CHANGE UP
NRBC # BLD: 0 /100 WBCS — SIGNIFICANT CHANGE UP (ref 0–0)
PH UR: 7 — SIGNIFICANT CHANGE UP (ref 5–8)
PLATELET # BLD AUTO: 181 K/UL — SIGNIFICANT CHANGE UP (ref 150–400)
POTASSIUM SERPL-MCNC: 3.9 MMOL/L — SIGNIFICANT CHANGE UP (ref 3.5–5.3)
POTASSIUM SERPL-SCNC: 3.9 MMOL/L — SIGNIFICANT CHANGE UP (ref 3.5–5.3)
PROT SERPL-MCNC: 6.6 GM/DL — SIGNIFICANT CHANGE UP (ref 6–8.3)
PROT UR-MCNC: 15 MG/DL
RBC # BLD: 4.32 M/UL — SIGNIFICANT CHANGE UP (ref 4.2–5.8)
RBC # FLD: 14.3 % — SIGNIFICANT CHANGE UP (ref 10.3–14.5)
RBC CASTS # UR COMP ASSIST: SIGNIFICANT CHANGE UP /HPF (ref 0–4)
SODIUM SERPL-SCNC: 141 MMOL/L — SIGNIFICANT CHANGE UP (ref 135–145)
SP GR SPEC: 1 — LOW (ref 1.01–1.02)
TROPONIN I, HIGH SENSITIVITY RESULT: 26.4 NG/L — SIGNIFICANT CHANGE UP
UROBILINOGEN FLD QL: NEGATIVE MG/DL — SIGNIFICANT CHANGE UP
WBC # BLD: 12.54 K/UL — HIGH (ref 3.8–10.5)
WBC # FLD AUTO: 12.54 K/UL — HIGH (ref 3.8–10.5)
WBC UR QL: SIGNIFICANT CHANGE UP

## 2023-06-29 PROCEDURE — 72125 CT NECK SPINE W/O DYE: CPT | Mod: 26,MA

## 2023-06-29 PROCEDURE — 99284 EMERGENCY DEPT VISIT MOD MDM: CPT

## 2023-06-29 PROCEDURE — 70450 CT HEAD/BRAIN W/O DYE: CPT | Mod: 26,MA

## 2023-06-29 PROCEDURE — 93010 ELECTROCARDIOGRAM REPORT: CPT

## 2023-06-29 NOTE — ED PROVIDER NOTE - NSFOLLOWUPINSTRUCTIONS_ED_ALL_ED_FT
Fall prevention includes ways to make your home and other areas safer. It also includes ways you can move more carefully to prevent a fall. Health conditions that cause changes in your blood pressure, vision, or muscle strength and coordination may increase your risk for falls. Medicines may also increase your risk for falls if they make you dizzy, weak, or sleepy.   Seek Medical Attention If:  You have fallen and are unconscious.   fallen and cannot move part of your body.    You have fallen and have pain or a headache.   Fall prevention tips:   Stand or sit up slowly.   Use assistive devices as directed.   You may need to have grab bars put in your bathroom near the toilet or in the shower. Wear shoes that fit well and have soles that . Wear shoes both inside and outside. Do not wear shoes with high heels. Wear a personal alarm that can call 911 in an emergency.    Manage your medical conditions. Keep all appointments with your healthcare providers. Visit your eye doctor as directed.    Home safety tips:    Put nonslip strips on your bath or shower floor to prevent you from slipping.  Use a shower seat so you do not need to stand while you shower. Sit on the toilet or a chair in your bathroom to dry yourself and put on clothing. This will prevent you from losing your balance from drying or dressing yourself while you are standing. Keep paths clear. Remove books, shoes, and other objects from walkways and stairs. Place cords for telephones and lamps out of the way so that you do not need to walk over them. Tape them down if you cannot move them. Remove small rugs or secure it with double-sided tape to prevent you from tripping. Install bright lights in your home. Use night lights to help light paths to the bathroom or kitchen. Always turn on the light before you start walking. Keep items you use often on shelves within reach. Do not use a step stool to help you reach an item. Place reflective tape on the edges of your stairs. To see better.

## 2023-06-29 NOTE — ED ADULT NURSE NOTE - CHIEF COMPLAINT QUOTE
pt from [facility] pw fall.  Per [facility] RN they found pt sitting on floor.  Pt normally bedbound and has myoclonic tremors.  Pt aox1, c/o Bl knee pain, however per RN, pt has had bl knee pain since coming to [facility]. pt has a peg tube.   hx of CAD, COPD, DM, HTN.

## 2023-06-29 NOTE — ED ADULT NURSE REASSESSMENT NOTE - NSFALLHARMRISKINTERV_ED_ALL_ED

## 2023-06-29 NOTE — ED ADULT NURSE REASSESSMENT NOTE - NS ED NURSE REASSESS COMMENT FT1
pt picked up by Nurse Steadly and transported back to [facility]. d/c papers given .
Received report from night RN, patient A&Ox4 has comfortable appearance. Discharged, awaiting  from [facility] RN

## 2023-06-29 NOTE — ED ADULT NURSE NOTE - OBJECTIVE STATEMENT
pt from [facility] pw fall.  Per [facility] RN they found pt sitting on floor.  Pt normally bedbound and has myoclonic tremors.  Pt aox1, c/o Bl knee pain, however per RN, pt has had bl knee pain since coming to [facility]. pt has a peg tube.   hx of CAD, COPD, DM, HTN.

## 2023-06-29 NOTE — ED PROVIDER NOTE - DATE/TIME 1
Patient received from PACU awake, alert and oriented x 4, post CYSTOSCOPY, RETROGRADE, PYELOGRAM, URETEROSCOPY, STONE MANIPULATION WITH HOLMIUM LASER AND INSERTION RIGHT URETERAL STENT by Dr. Sunshine Stein. Post surgical orders discussed with patient, verbalized understanding. 29-Jun-2023 06:28

## 2023-06-29 NOTE — ED ADULT NURSE REASSESSMENT NOTE - NSFALLRISKFACTORS_ED_ALL_ED
Recommended Observation. Coagulation: Bleeding disorder either through use of anticoagulants or underlying clinical condition(s)

## 2023-06-29 NOTE — ED PROVIDER NOTE - CLINICAL SUMMARY MEDICAL DECISION MAKING FREE TEXT BOX
Pt p/w h/o fall and jerky movements w/o LOC. Pt appears well, hemodynamically stable, neurovascularly intact. The fall is likely mechanical vs seizure vs polypharmacy with plan for CT head and labs to r/o ICH or electrolyte abnormalities. Dispo pending workup, likely discharge.

## 2023-06-29 NOTE — ED ADULT NURSE NOTE - NSFALLHARMRISKINTERV_ED_ALL_ED
Assistance OOB with selected safe patient handling equipment if applicable/Assistance with ambulation/Communicate risk of Fall with Harm to all staff, patient, and family/Monitor gait and stability/Provide visual cue: red socks, yellow wristband, yellow gown, etc/Reinforce activity limits and safety measures with patient and family/Bed in lowest position, wheels locked, appropriate side rails in place/Call bell, personal items and telephone in reach/Instruct patient to call for assistance before getting out of bed/chair/stretcher/Non-slip footwear applied when patient is off stretcher/[place] to call system/Physically safe environment - no spills, clutter or unnecessary equipment/Purposeful Proactive Rounding/Room/bathroom lighting operational, light cord in reach

## 2023-06-29 NOTE — ED ADULT TRIAGE NOTE - CHIEF COMPLAINT QUOTE
pt from [facility] pw fall.  Per [facility] RN they found pt sitting on floor.  Pt normally bedbound.  Pt aox1, c/o Bl knee pain, however per RN, pt has had bl knee pain since coming to [facility]. pt has a peg tube.   hx of CAD, COPD, DM, HTN. pt from [facility] pw fall.  Per [facility] RN they found pt sitting on floor.  Pt normally bedbound and has myoclonic tremors.  Pt aox1, c/o Bl knee pain, however per RN, pt has had bl knee pain since coming to [facility]. pt has a peg tube.   hx of CAD, COPD, DM, HTN.

## 2023-06-29 NOTE — ED PROVIDER NOTE - PATIENT PORTAL LINK FT
You can access the FollowMyHealth Patient Portal offered by [facility] by registering at the following website: [email or web address]. By joining [employer]’s FollowMyHealth portal, you will also be able to view your health information using other applications (apps) compatible with our system.

## 2023-06-29 NOTE — ED PROVIDER NOTE - OBJECTIVE STATEMENT
Pt is not a historian, has difficulty talking. Most of the hist given by his son. Pt is a 73 y/o M with PMH of MI f/b recent ICU stay, seizure disorder, CABG was brought in ED from [facility] for possible fall and jerky movements/tremors. Pt was discovered sitting on the floor by [facility] staff. The pt denies any injury or LOC. No fever, NVD.

## 2023-06-29 NOTE — ED ADULT TRIAGE NOTE - TEMPERATURE IN FAHRENHEIT (DEGREES F)
98.3 Curvilinear Excision Additional Text (Leave Blank If You Do Not Want): The margin was drawn around the clinically apparent lesion.  A curvilinear shape was then drawn on the skin incorporating the lesion and margins.  Incisions were then made along these lines to the appropriate tissue plane and the lesion was extirpated.

## 2023-06-29 NOTE — ED PROVIDER NOTE - PROGRESS NOTE DETAILS
Urine labs and CTs all negative, son made aware of findings and is amenable to bring patient back to [facility].  Will give movement disorder center referral as per son's request

## 2023-06-29 NOTE — ED PROVIDER NOTE - IV ALTEPLASE ADMIN OUTSIDE HIDDEN
OT IRP Treatment      Primary Rehabilitation Diagnosis: Cardiac weakness  Expected Discharge Date: 03/24/20(no reconference)  Planned Discharge Destination: Home    SUBJECTIVE: Subjective: Pt agreeable to OT (03/22/20 0831)  Subjective/Objective Comments: pt seated in chair at end of session awaiting PT (03/22/20 0831)    OBJECTIVE:       See below for current functional status overview.  See OT flowsheet for full details regarding the OT therapy provided.    ASSESSMENT:   Treatment today focused on full dressing with pt performing with modified independent level, and med management task. Pt made one minimal error with medication task and was able to correct with OT specific cue. Pt only uses one pillbox at home and error would be prevented with pt using one pillbox. Pt uses magnifying glass to read labels due to baseline impaired vision. Pt reports understanding of error made and plans to check with nurse prior to d/c if new medications pt will be d/c home with.  Patient is demonstrating good progress.  Patient will benefit from further skilled OT  for continued training to help the patient meet goal of safe d/c home.    OT Identified Barriers to Discharge: none     This patient participated in all scheduled occupational therapy time with this therapist today.    EDUCATION:   On this date, education was provided to patient regarding  safe use of equipment, self-care activities, bathroom transfers, household mobility and safety with instrumental activities of daily living  The response to education was/were: Needs reinforcement    PLAN:   Continue skilled OT, including the following Treatment Interventions: Functional transfer training;Cognitive reorientation;Patient/Family training;Compensatory technique education (03/22/20 0831)   OT Frequency: 7 days/week (03/22/20 0831), Frequency Comments: 90 min at least 5X/week (03/22/20 0831)    Treatment Plan for Next Session: AM: bring sock aide and 4ww, complete item  retrieval, sponge bathing in room  Additional Plan Considerations:  PM: IADL retraining with 4ww, microwave use and laundry  Plan Comments:      RECOMMENDATIONS FOR DISCHARGE:  Recommendations for Discharge: OT WI: Home    PT/OT Mobility Equipment for Discharge: owns 2WW and 4WW, no other needs (03/20/20 1000)  PT/OT ADL Equipment for Discharge: anticipate no needs, has shower chair, long shoe horn, sock aid, grab bars, 4ww (03/22/20 0831)      FUNCTIONAL DATA OVERVIEW LAST 24 HOURS  ADLs   Self Cares/ADL's  Grooming Assistance: Modified independent;Standing at sink (03/22/20 0831)  Oral Hygiene Assistance: Modified independent;Standing at sink (03/22/20 0831)  Upper Body Dressing Assistance: Modified independent (03/22/20 0831)  Lower Body Clothing Assistance: Modified independent (03/22/20 0831)  Footwear Assistance: Modified independent (03/22/20 0831)  Dressing Equipment Used: Long handled shoehorn;Sock aid (03/22/20 0831)    Household mobility  Household Mobility  Sit to Stand: Modified Independent (03/22/20 0831)  Stand to Sit: Modified Independent (03/22/20 0831)  Stand Pivot Transfers: Modified Independent (03/22/20 0831)  Transfer Equipment: gait belt, 4ww (03/22/20 0831)  Sitting - Static: Independent (03/22/20 0831)  Sitting - Dynamic: Independent (03/22/20 0831)  Standing - Static: Modified Independent (03/22/20 0831)  Standing - Dynamic: Modified Independent (03/22/20 0831)  Household Mobility Comments #2: pt ambulated in room and in unnit with 4ww with no safety concerns and took seated rest breaks appropriately (03/22/20 0831)    Home Management  Home Management Skills  Medication Management: Supervision (03/22/20 0831)  Home Management Skills Comments: med management task completed, pt demonstrated ability to complete full week of pill box with one minimal error and was about to correct with a specfiic cue. OT educated pt on safety with med box set up and pt reports use of magnifying glass at home  and will confirm with nurse if she will d/c home with any new medications (03/22/20 0831)    Tolerance  OT Activity Tolerance  Activity Tolerance: 1:1 Activity to rest (03/22/20 0831)  Activity Tolerance Comments: fair + (03/22/20 0831)    Cognition  Communication/Cognition  Additional Functional Cognition Tests: Cognitive Performance Test (03/22/20 0831)  Additional Functional Cognition Tests Interpretation: MEDBOX 5/6 (03/22/20 0831)    Interventions  Other Interventions 1: Pt attended discharge planning group with emphasis on fall prevention and home safety. Please see POC note for details.  (03/21/20 1030)         show

## 2023-06-30 ENCOUNTER — NON-APPOINTMENT (OUTPATIENT)
Age: 73
End: 2023-06-30

## 2023-07-01 NOTE — PHYSICAL EXAM
[FreeTextEntry1] : Exam deferred due to Telehealth platform, however, patient attempted to stand up on video and appeared to have axial body tremors, and could only stand for several seconds with assistance.

## 2023-07-01 NOTE — HISTORY OF PRESENT ILLNESS
[Home] : at home, [unfilled] , at the time of the visit. [Medical Office: ([facility])___] : at the medical office located in  [Verbal consent obtained from patient] : the patient, [unfilled] [FreeTextEntry1] : 71 yo man, with recent prolonged hospitalization after cardiac arrest, associated with post-anoxic myoclonic seizures.  Mental status significantly improved, however, he was on prolonged bedrest while hospitalized, and is now significantly deconditioned with generalized weakness and gait instability.  He is now in rehab.\par \par He son is concerned about diffuse body/axial and leg tremors that occur when he tries to stand and appear to impair his gait, balance, and mobility, and wants to know if any medication can be tried to treat the tremors.  He was started on a trial of Clonazepam, but this has caused sedation.\par \par Hospital DC summary:\par "71 yo male w/ history of COPD, CAD s/p PCI w/ stent, CABG 2014, HF w/ PeF, 2nd \par degree heart block, s/p PPM, HTN, DM, CKD Stage 3, CVA- lacunar infarcts was \par brought in for unresponsiveness and had a cardiac arrest in the ED on 11/14. \par ACLS for PEA arrest and ROSC returned after 5 minutes. Cardiac arrest possibly \par secondary to severe hypoglycemia from eating less and not tracking blood sugar \par carefully. He was intubated and admitted to the ICU. ECHO showed Takotsubo \par cardiomyopathy. EF 50-55%, LVH, mild pHTN. Pt had tonic clonic seizures shortly \par post arrest, likely due to hypoglycemia vs anoxic encephalopathy. Hospital \par course was complicated by prolonged hypoxemic respiratory failure. Difficult \par extubation requiring s/p trach and PEG on 12/5, ATN & shock liver on admission, \par left upper extremity/left subclavian DVT on 12/11 placed on Eliquis, multiple \par infections (Enterococcal faecalis cellulitis on 12/12, infected left hand \par hematoma status post I&D by plastics on 12/13, tracheobronchitis secondary to \par citrobacter, stage 3-4 sacral decubiti wound s/p debridement 12/19 and fever of \par 102 on 1/19. Pt was weaned off tracheostomy and it was removed 3/7. His PEG \par feeds are now on hold and patient is on puree diet. I have spoken w/ the son \par and informed him that if patient continues to eat well, the PEG can be removed. \par The patient could not tolerate CPAP for AUDI and it was stopped on 3/7/2023. Of \par note, his CT chest on admission in November showed a small left lower lobe \par nodules, measuring up to 1 cm and I informed his son that the pt will need a CT \par in 2 months (6 months from November) to monitor for stability. "

## 2023-07-01 NOTE — ASSESSMENT
[FreeTextEntry1] : 73 yo man, with recent prolonged hospitalization after cardiac arrest, associated with post-anoxic myoclonic seizures.  Significant improvement in mental status.\par Current gait and balance instability is likely multifactorial (myoclonus, generalized weakness/deconditioning, axial tremors). \par \par Son is most concerned about tremulous movements.\par \par Plan:\par 1. Consider trial of Primidone 25mg PO QHS (this was communicated with nurse at rehab, who will discuss with rehab attending)\par 2. Consult movement disorders to evaluate gait and tremors\par 3. Continue current AEDs: Keppra 1500mg BID, Depakote 750mg / 1000mg (liquid formulations)\par 4. Seizure precautions, including no driving or operating heavy equipment, no unsupervised swimming, using a shower instead of a bathtub, no climbing ladders or working at elevations, no use of sharp dangerous tools or devices.\par 5. PT/OT\par 6. Patient agrees with plan.\par \par Mark Loo MD\par [facility]\par \par Greater than 50% of the encounter was spent on counseling and coordination of care discussing differential diagnosis, diagnostic testing, and treatment options. We have talked about appropriate follow up, and I have spent 45 minutes of face to face time with the patient.\par

## 2023-08-14 ENCOUNTER — APPOINTMENT (OUTPATIENT)
Dept: SURGERY | Facility: CLINIC | Age: 73
End: 2023-08-14
Payer: MEDICAID

## 2023-08-14 ENCOUNTER — TRANSCRIPTION ENCOUNTER (OUTPATIENT)
Age: 73
End: 2023-08-14

## 2023-08-14 VITALS
HEIGHT: 69 IN | HEART RATE: 54 BPM | BODY MASS INDEX: 22.81 KG/M2 | WEIGHT: 154 LBS | TEMPERATURE: 98.2 F | OXYGEN SATURATION: 96 %

## 2023-08-14 PROCEDURE — 99213 OFFICE O/P EST LOW 20 MIN: CPT

## 2023-08-14 NOTE — PHYSICAL EXAM
[No Rash or Lesion] : No rash or lesion [Calm] : calm [de-identified] : comfortable in wheel chair.  [de-identified] : no scleral icterus [de-identified] : breathing comfortably on room air, no cough [de-identified] : Soft, not tender and not distended gastrostomy tube in place

## 2023-08-14 NOTE — PLAN
[FreeTextEntry1] : 72 year old man with gastrostomy tube in place, now tolerating PO intake, and not using the gastrostomy tube - gastrostomy tube removed at bedside without issues. Gauze and tape applied for dressing - local wound care instructions written on [facility] consultation note. Change dressing daily with gauze and tape, increase frequency of change if saturated.  - follow up as needed

## 2023-08-14 NOTE — HISTORY OF PRESENT ILLNESS
[de-identified] : 72 year old man s/p percutaneous endoscopy gastrostomy tube placement, and open tracheostomy on 12/5/2022.  [de-identified] : He presents to the office today for PEG removal. He has been at [facility], and has recovered well.  He is tolerating PO intake, and has not needed to use the gastrostomy tube.  He denies any nausea, vomiting, fever or chills. Tolerating PO intake.

## 2023-08-24 ENCOUNTER — OUTPATIENT (OUTPATIENT)
Dept: OUTPATIENT SERVICES | Facility: HOSPITAL | Age: 73
LOS: 1 days | End: 2023-08-24
Payer: MEDICAID

## 2023-08-24 ENCOUNTER — APPOINTMENT (OUTPATIENT)
Dept: NEUROLOGY | Facility: HOSPITAL | Age: 73
End: 2023-08-24

## 2023-08-24 VITALS
WEIGHT: 154 LBS | BODY MASS INDEX: 22.81 KG/M2 | HEART RATE: 50 BPM | DIASTOLIC BLOOD PRESSURE: 81 MMHG | SYSTOLIC BLOOD PRESSURE: 172 MMHG | HEIGHT: 69 IN | TEMPERATURE: 96.6 F

## 2023-08-24 DIAGNOSIS — R25.1 TREMOR, UNSPECIFIED: ICD-10-CM

## 2023-08-24 DIAGNOSIS — Z95.1 PRESENCE OF AORTOCORONARY BYPASS GRAFT: Chronic | ICD-10-CM

## 2023-08-24 DIAGNOSIS — R51.9 HEADACHE, UNSPECIFIED: ICD-10-CM

## 2023-08-24 DIAGNOSIS — G25.3 MYOCLONUS: ICD-10-CM

## 2023-08-24 LAB
ALBUMIN SERPL ELPH-MCNC: 3.8 G/DL — SIGNIFICANT CHANGE UP (ref 3.3–5)
ALP SERPL-CCNC: 71 U/L — SIGNIFICANT CHANGE UP (ref 40–120)
ALT FLD-CCNC: 37 U/L — SIGNIFICANT CHANGE UP (ref 10–45)
ANION GAP SERPL CALC-SCNC: 9 MMOL/L — SIGNIFICANT CHANGE UP (ref 5–17)
AST SERPL-CCNC: 33 U/L — SIGNIFICANT CHANGE UP (ref 10–40)
BASOPHILS # BLD AUTO: 0.06 K/UL — SIGNIFICANT CHANGE UP (ref 0–0.2)
BASOPHILS NFR BLD AUTO: 0.7 % — SIGNIFICANT CHANGE UP (ref 0–2)
BILIRUB SERPL-MCNC: 0.2 MG/DL — SIGNIFICANT CHANGE UP (ref 0.2–1.2)
BUN SERPL-MCNC: 29 MG/DL — HIGH (ref 7–23)
CALCIUM SERPL-MCNC: 9 MG/DL — SIGNIFICANT CHANGE UP (ref 8.4–10.5)
CHLORIDE SERPL-SCNC: 98 MMOL/L — SIGNIFICANT CHANGE UP (ref 96–108)
CO2 SERPL-SCNC: 31 MMOL/L — SIGNIFICANT CHANGE UP (ref 22–31)
CREAT SERPL-MCNC: 0.83 MG/DL — SIGNIFICANT CHANGE UP (ref 0.5–1.3)
EGFR: 92 ML/MIN/1.73M2 — SIGNIFICANT CHANGE UP
EOSINOPHIL # BLD AUTO: 0.77 K/UL — HIGH (ref 0–0.5)
EOSINOPHIL NFR BLD AUTO: 8.6 % — HIGH (ref 0–6)
GLUCOSE SERPL-MCNC: 85 MG/DL — SIGNIFICANT CHANGE UP (ref 70–99)
HCT VFR BLD CALC: 41.8 % — SIGNIFICANT CHANGE UP (ref 39–50)
HGB BLD-MCNC: 13.7 G/DL — SIGNIFICANT CHANGE UP (ref 13–17)
IMM GRANULOCYTES NFR BLD AUTO: 0.3 % — SIGNIFICANT CHANGE UP (ref 0–0.9)
LYMPHOCYTES # BLD AUTO: 3.19 K/UL — SIGNIFICANT CHANGE UP (ref 1–3.3)
LYMPHOCYTES # BLD AUTO: 35.6 % — SIGNIFICANT CHANGE UP (ref 13–44)
MCHC RBC-ENTMCNC: 30.7 PG — SIGNIFICANT CHANGE UP (ref 27–34)
MCHC RBC-ENTMCNC: 32.8 GM/DL — SIGNIFICANT CHANGE UP (ref 32–36)
MCV RBC AUTO: 93.7 FL — SIGNIFICANT CHANGE UP (ref 80–100)
MONOCYTES # BLD AUTO: 0.76 K/UL — SIGNIFICANT CHANGE UP (ref 0–0.9)
MONOCYTES NFR BLD AUTO: 8.5 % — SIGNIFICANT CHANGE UP (ref 2–14)
NEUTROPHILS # BLD AUTO: 4.16 K/UL — SIGNIFICANT CHANGE UP (ref 1.8–7.4)
NEUTROPHILS NFR BLD AUTO: 46.3 % — SIGNIFICANT CHANGE UP (ref 43–77)
PLATELET # BLD AUTO: 185 K/UL — SIGNIFICANT CHANGE UP (ref 150–400)
POTASSIUM SERPL-MCNC: 4.6 MMOL/L — SIGNIFICANT CHANGE UP (ref 3.5–5.3)
POTASSIUM SERPL-SCNC: 4.6 MMOL/L — SIGNIFICANT CHANGE UP (ref 3.5–5.3)
PROT SERPL-MCNC: 6.6 G/DL — SIGNIFICANT CHANGE UP (ref 6–8.3)
RBC # BLD: 4.46 M/UL — SIGNIFICANT CHANGE UP (ref 4.2–5.8)
RBC # FLD: 15.4 % — HIGH (ref 10.3–14.5)
SODIUM SERPL-SCNC: 138 MMOL/L — SIGNIFICANT CHANGE UP (ref 135–145)
WBC # BLD: 8.97 K/UL — SIGNIFICANT CHANGE UP (ref 3.8–10.5)
WBC # FLD AUTO: 8.97 K/UL — SIGNIFICANT CHANGE UP (ref 3.8–10.5)

## 2023-08-24 PROCEDURE — 80184 ASSAY OF PHENOBARBITAL: CPT

## 2023-08-24 PROCEDURE — 80165 DIPROPYLACETIC ACID FREE: CPT

## 2023-08-24 PROCEDURE — G0463: CPT

## 2023-08-24 PROCEDURE — 85025 COMPLETE CBC W/AUTO DIFF WBC: CPT

## 2023-08-24 PROCEDURE — 80164 ASSAY DIPROPYLACETIC ACD TOT: CPT

## 2023-08-24 PROCEDURE — 80177 DRUG SCRN QUAN LEVETIRACETAM: CPT

## 2023-08-24 PROCEDURE — 80053 COMPREHEN METABOLIC PANEL: CPT

## 2023-08-24 NOTE — REVIEW OF SYSTEMS
[As Noted in HPI] : as noted in HPI [Difficulty Walking] : difficulty walking [Negative] : Heme/Lymph [Confused or Disoriented] : no confusion [Facial Weakness] : no facial weakness [Arm Weakness] : no arm weakness [Hand Weakness] : no hand weakness [Leg Weakness] : no leg weakness [Numbness] : no numbness [Tingling] : no tingling [de-identified] : postural tremors especially in lower extremities and trunk

## 2023-08-24 NOTE — DATA REVIEWED
[de-identified] : CT brain 6/2023:  ACC: [id number] EXAM:  CT CERVICAL SPINE   ORDERED BY: JONATHAN GARBER   ACC: [id number] EXAM:  CT BRAIN   ORDERED BY: JONATHAN GARBER   PROCEDURE DATE:  06/29/2023      INTERPRETATION:  CLINICAL INFORMATION: Trauma.  COMPARISON: CT head 11/15/2022.  TECHNIQUE: Noncontrast CT scan of the head and cervical spine was performed. Thin section axial images were obtained through the cervical spine. Sagittal and coronal reformations were created.  COMPARISON: No similar prior studies available for comparison.  FINDINGS:  BRAIN: PARENCHYMA:  No acute intracranial hemorrhage, mass effect, or midline  shift. Old right frontal, right inferior frontal, and left posterior  parietal infarcts are stable. Chronic bilateral thalamic lacunar  infarcts. Extensive periventricular and subcortical white matter  hypoattenuation without mass effect is noted, non-specific, but likely  related to chronic small vessel ischemic changes.. Cerebral volume loss  is present with proportional prominence of the sulci and ventricles. VENTRICLES: No hydrocephalus. EXTRA-AXIAL:  No abnormal extraaxial collection. PARANASAL SINUSES:  Patchy ethmoid air cell opacification. TYMPANOMASTOID CAVITIES:  Within normal limits. ORBITS: Bilateral cataract surgery. CALVARIUM:  Within normal limits.  CERVICAL SPINE: ALIGNMENT: The normal cervical lordosis is maintained. BONES: No acute fracture or prevertebral soft tissue swelling. The  craniocervical junction is unremarkable. DISC LEVEL EVALUATION: Multilevel degenerative changes of the cervical  spine characterized by marginal osteophyte formation, small disc bulges,  and uncovertebral and facet joint arthrosis. NECK SOFT TISSUES: Partially imaged AICD leads in the left subclavian  vein. Median sternotomy wire.. VISUALIZED LUNGS: Within normal limits.  IMPRESSION:  Head CT: No displaced calvarial fracture or acute intracranial  hemorrhage. Stable chronic infarcts.  Cervical spine CT: No acute fracture. Cervical spondylosis.   [de-identified] : 1/20/23:  EEG Classification / Summary: Abnormal video-EEG in the awake, drowsy, and asleep states due to: -Frequent myoclonic seizures. The patient is awake and making purposeful movements or interacting with staff during some of the seizures; other seizures occur during drowsiness or sleep; other seizures are electrographic (no clinical correlate), such as during drowsiness or sleep. -Frequent bursts and runs of central polyspike-wave discharges without clear evolution and without clinical correlate -Mild diffuse slowing  Clinical Impression: -Frequent myoclonic seizures -Frequent bursts and runs of central polyspike-wave discharges indicate risk of further myoclonic seizures from the central region -Mild diffuse cerebral dysfunction of nonspecific etiology  Findings are overall similar to the prior day. In the absence of further clinical concern, consider disconnecting study with reconnection in the future if indicated.

## 2023-08-24 NOTE — PHYSICAL EXAM
[General Appearance - Alert] : alert [General Appearance - In No Acute Distress] : in no acute distress [Oriented To Time, Place, And Person] : oriented to person, place, and time [Person] : oriented to person [Place] : oriented to place [Time] : oriented to time [Cranial Nerves Optic (II)] : visual acuity intact bilaterally,  visual fields full to confrontation, pupils equal round and reactive to light [Cranial Nerves Oculomotor (III)] : extraocular motion intact [Cranial Nerves Trigeminal (V)] : facial sensation intact symmetrically [Cranial Nerves Facial (VII)] : face symmetrical [Cranial Nerves Vestibulocochlear (VIII)] : hearing was intact bilaterally [Cranial Nerves Glossopharyngeal (IX)] : tongue and palate midline [Cranial Nerves Accessory (XI - Cranial And Spinal)] : head turning and shoulder shrug symmetric [Cranial Nerves Hypoglossal (XII)] : there was no tongue deviation with protrusion [Sensation Tactile Decrease] : light touch was intact [Tremor] : a tremor present [2+] : Brachioradialis left 2+ [1+] : Ankle jerk left 1+ [Sclera] : the sclera and conjunctiva were normal [PERRL With Normal Accommodation] : pupils were equal in size, round, reactive to light, with normal accommodation [Paresis Pronator Drift Right-Sided] : no pronator drift on the right [Motor Strength Upper Extremities Right] : strength was normal in the right upper extremity [Motor Strength Lower Extremities Right] : strength was normal in the right lower extremity [Past-pointing] : there was no past-pointing [Dysdiadochokinesia Bilaterally] : not present [Coordination - Dysmetria Impaired Finger-to-Nose Bilateral] : not present [Coordination - Dysmetria Impaired Heel-to-Shin Bilateral] : not present [Plantar Reflex Right Only] : normal on the right [Plantar Reflex Left Only] : normal on the left [FreeTextEntry8] : Postural tremors noted in lower extremities and trunk No resting/intention tremors noted. [FreeTextEntry1] : Gait instability due to tremors

## 2023-08-24 NOTE — ASSESSMENT
[FreeTextEntry1] : 73 yo man, with  prolonged hospitalization s/p cardiac arrest 11/14/22 s/p trach and PEG, associated with post-anoxic myoclonic jerks and intention tremors/gait instability  Impression: Current gait and balance instability is likely multifactorial  (generalized weakness/deconditioning, axial/extremity tremors) in setting of cardiac arrest with seizure like activity. No noted intention/action tremors in extremities, but with postural tremors in lower extremities. No recent myoclonic jerks.    Plain:  1. Continue primidone 100 mg BID for the tremors  2. Follow up with movement disorders to evaluate gait and tremors (pt has medicaid) 3. Continue current AEDs for history of tonic clonic seizures: Keppra 1500mg BID, Depakote 750mg / 1000mg (liquid formulations) 4. Continue ASA daily 5. CBC, CMP, depakote level, keppra level, phenobarbital level 6. Seizure precautions, including no driving or operating heavy equipment, no unsupervised swimming, using a shower instead of a bathtub, no climbing ladders or working at elevations, no use of sharp dangerous tools or devices. 7. PT/OT  Case discussed with neurologist Dr. Meyers.

## 2023-08-25 LAB
PHENOBARB SERPL-MCNC: 4.8 UG/ML — LOW (ref 15–40)
VALPROATE SERPL-MCNC: 72 UG/ML — SIGNIFICANT CHANGE UP (ref 50–100)

## 2023-08-27 ENCOUNTER — INPATIENT (INPATIENT)
Facility: HOSPITAL | Age: 73
LOS: 5 days | Discharge: NURSING, REHAB OR LONG-TERM CARE | End: 2023-09-02
Attending: INTERNAL MEDICINE | Admitting: INTERNAL MEDICINE
Payer: MEDICAID

## 2023-08-27 VITALS
HEART RATE: 114 BPM | WEIGHT: 158.95 LBS | DIASTOLIC BLOOD PRESSURE: 134 MMHG | OXYGEN SATURATION: 100 % | RESPIRATION RATE: 28 BRPM | TEMPERATURE: 99 F | HEIGHT: 67 IN | SYSTOLIC BLOOD PRESSURE: 235 MMHG

## 2023-08-27 DIAGNOSIS — Z95.1 PRESENCE OF AORTOCORONARY BYPASS GRAFT: Chronic | ICD-10-CM

## 2023-08-27 LAB
ANION GAP SERPL CALC-SCNC: 4 MMOL/L — LOW (ref 5–17)
APTT BLD: 24.6 SEC — SIGNIFICANT CHANGE UP (ref 24.5–35.6)
BASOPHILS # BLD AUTO: 0.09 K/UL — SIGNIFICANT CHANGE UP (ref 0–0.2)
BASOPHILS NFR BLD AUTO: 0.5 % — SIGNIFICANT CHANGE UP (ref 0–2)
BUN SERPL-MCNC: 26 MG/DL — HIGH (ref 7–23)
CALCIUM SERPL-MCNC: 8.2 MG/DL — LOW (ref 8.5–10.1)
CHLORIDE SERPL-SCNC: 98 MMOL/L — SIGNIFICANT CHANGE UP (ref 96–108)
CO2 SERPL-SCNC: 32 MMOL/L — HIGH (ref 22–31)
CREAT SERPL-MCNC: 0.95 MG/DL — SIGNIFICANT CHANGE UP (ref 0.5–1.3)
EGFR: 84 ML/MIN/1.73M2 — SIGNIFICANT CHANGE UP
EOSINOPHIL # BLD AUTO: 0.67 K/UL — HIGH (ref 0–0.5)
EOSINOPHIL NFR BLD AUTO: 4.1 % — SIGNIFICANT CHANGE UP (ref 0–6)
GAS PNL BLDA: SIGNIFICANT CHANGE UP
GLUCOSE BLDC GLUCOMTR-MCNC: 200 MG/DL — HIGH (ref 70–99)
GLUCOSE SERPL-MCNC: 203 MG/DL — HIGH (ref 70–99)
HCT VFR BLD CALC: 43.2 % — SIGNIFICANT CHANGE UP (ref 39–50)
HGB BLD-MCNC: 14.1 G/DL — SIGNIFICANT CHANGE UP (ref 13–17)
IMM GRANULOCYTES NFR BLD AUTO: 0.8 % — SIGNIFICANT CHANGE UP (ref 0–0.9)
INR BLD: 0.94 RATIO — SIGNIFICANT CHANGE UP (ref 0.85–1.18)
LYMPHOCYTES # BLD AUTO: 18.9 % — SIGNIFICANT CHANGE UP (ref 13–44)
LYMPHOCYTES # BLD AUTO: 3.11 K/UL — SIGNIFICANT CHANGE UP (ref 1–3.3)
MAGNESIUM SERPL-MCNC: 2.1 MG/DL — SIGNIFICANT CHANGE UP (ref 1.6–2.6)
MCHC RBC-ENTMCNC: 29.9 PG — SIGNIFICANT CHANGE UP (ref 27–34)
MCHC RBC-ENTMCNC: 32.6 G/DL — SIGNIFICANT CHANGE UP (ref 32–36)
MCV RBC AUTO: 91.7 FL — SIGNIFICANT CHANGE UP (ref 80–100)
MONOCYTES # BLD AUTO: 1.04 K/UL — HIGH (ref 0–0.9)
MONOCYTES NFR BLD AUTO: 6.3 % — SIGNIFICANT CHANGE UP (ref 2–14)
NEUTROPHILS # BLD AUTO: 11.42 K/UL — HIGH (ref 1.8–7.4)
NEUTROPHILS NFR BLD AUTO: 69.4 % — SIGNIFICANT CHANGE UP (ref 43–77)
NRBC # BLD: 0 /100 WBCS — SIGNIFICANT CHANGE UP (ref 0–0)
NT-PROBNP SERPL-SCNC: 3124 PG/ML — HIGH (ref 0–450)
PLATELET # BLD AUTO: 171 K/UL — SIGNIFICANT CHANGE UP (ref 150–400)
POTASSIUM SERPL-MCNC: 4.8 MMOL/L — SIGNIFICANT CHANGE UP (ref 3.5–5.3)
POTASSIUM SERPL-SCNC: 4.8 MMOL/L — SIGNIFICANT CHANGE UP (ref 3.5–5.3)
PROTHROM AB SERPL-ACNC: 11.3 SEC — SIGNIFICANT CHANGE UP (ref 9.5–13)
RBC # BLD: 4.71 M/UL — SIGNIFICANT CHANGE UP (ref 4.2–5.8)
RBC # FLD: 15.4 % — HIGH (ref 10.3–14.5)
SODIUM SERPL-SCNC: 134 MMOL/L — LOW (ref 135–145)
TROPONIN I, HIGH SENSITIVITY RESULT: 25.5 NG/L — SIGNIFICANT CHANGE UP
VALPROATE FREE SERPL-MCNC: 10.3 MG/L — SIGNIFICANT CHANGE UP (ref 4.8–17.3)
WBC # BLD: 16.46 K/UL — HIGH (ref 3.8–10.5)
WBC # FLD AUTO: 16.46 K/UL — HIGH (ref 3.8–10.5)

## 2023-08-27 PROCEDURE — 99223 1ST HOSP IP/OBS HIGH 75: CPT

## 2023-08-27 PROCEDURE — 99291 CRITICAL CARE FIRST HOUR: CPT

## 2023-08-27 PROCEDURE — 71045 X-RAY EXAM CHEST 1 VIEW: CPT | Mod: 26

## 2023-08-27 PROCEDURE — 93010 ELECTROCARDIOGRAM REPORT: CPT

## 2023-08-27 RX ORDER — PRIMIDONE 250 MG/1
100 TABLET ORAL DAILY
Refills: 0 | Status: DISCONTINUED | OUTPATIENT
Start: 2023-08-27 | End: 2023-09-02

## 2023-08-27 RX ORDER — GLUCAGON INJECTION, SOLUTION 0.5 MG/.1ML
1 INJECTION, SOLUTION SUBCUTANEOUS ONCE
Refills: 0 | Status: DISCONTINUED | OUTPATIENT
Start: 2023-08-27 | End: 2023-09-02

## 2023-08-27 RX ORDER — DEXTROSE 50 % IN WATER 50 %
12.5 SYRINGE (ML) INTRAVENOUS ONCE
Refills: 0 | Status: DISCONTINUED | OUTPATIENT
Start: 2023-08-27 | End: 2023-09-02

## 2023-08-27 RX ORDER — PRIMIDONE 250 MG/1
100 TABLET ORAL AT BEDTIME
Refills: 0 | Status: DISCONTINUED | OUTPATIENT
Start: 2023-08-27 | End: 2023-09-02

## 2023-08-27 RX ORDER — ATORVASTATIN CALCIUM 80 MG/1
80 TABLET, FILM COATED ORAL AT BEDTIME
Refills: 0 | Status: DISCONTINUED | OUTPATIENT
Start: 2023-08-27 | End: 2023-09-02

## 2023-08-27 RX ORDER — PREGABALIN 225 MG/1
1000 CAPSULE ORAL DAILY
Refills: 0 | Status: DISCONTINUED | OUTPATIENT
Start: 2023-08-27 | End: 2023-09-02

## 2023-08-27 RX ORDER — TAMSULOSIN HYDROCHLORIDE 0.4 MG/1
0.4 CAPSULE ORAL AT BEDTIME
Refills: 0 | Status: DISCONTINUED | OUTPATIENT
Start: 2023-08-27 | End: 2023-09-02

## 2023-08-27 RX ORDER — VALPROIC ACID (AS SODIUM SALT) 250 MG/5ML
1000 SOLUTION, ORAL ORAL AT BEDTIME
Refills: 0 | Status: DISCONTINUED | OUTPATIENT
Start: 2023-08-27 | End: 2023-09-02

## 2023-08-27 RX ORDER — SACUBITRIL AND VALSARTAN 24; 26 MG/1; MG/1
1 TABLET, FILM COATED ORAL
Qty: 0 | Refills: 0 | DISCHARGE

## 2023-08-27 RX ORDER — DEXTROSE 50 % IN WATER 50 %
25 SYRINGE (ML) INTRAVENOUS ONCE
Refills: 0 | Status: DISCONTINUED | OUTPATIENT
Start: 2023-08-27 | End: 2023-09-02

## 2023-08-27 RX ORDER — BUDESONIDE AND FORMOTEROL FUMARATE DIHYDRATE 160; 4.5 UG/1; UG/1
2 AEROSOL RESPIRATORY (INHALATION)
Refills: 0 | Status: DISCONTINUED | OUTPATIENT
Start: 2023-08-27 | End: 2023-09-02

## 2023-08-27 RX ORDER — ASPIRIN/CALCIUM CARB/MAGNESIUM 324 MG
81 TABLET ORAL DAILY
Refills: 0 | Status: DISCONTINUED | OUTPATIENT
Start: 2023-08-27 | End: 2023-09-02

## 2023-08-27 RX ORDER — INSULIN LISPRO 100/ML
VIAL (ML) SUBCUTANEOUS
Refills: 0 | Status: DISCONTINUED | OUTPATIENT
Start: 2023-08-27 | End: 2023-09-02

## 2023-08-27 RX ORDER — POLYETHYLENE GLYCOL 3350 17 G/17G
17 POWDER, FOR SOLUTION ORAL
Refills: 0 | Status: DISCONTINUED | OUTPATIENT
Start: 2023-08-27 | End: 2023-09-02

## 2023-08-27 RX ORDER — SENNA PLUS 8.6 MG/1
2 TABLET ORAL AT BEDTIME
Refills: 0 | Status: DISCONTINUED | OUTPATIENT
Start: 2023-08-27 | End: 2023-09-02

## 2023-08-27 RX ORDER — LEVETIRACETAM 250 MG/1
1500 TABLET, FILM COATED ORAL
Refills: 0 | Status: DISCONTINUED | OUTPATIENT
Start: 2023-08-27 | End: 2023-08-31

## 2023-08-27 RX ORDER — FUROSEMIDE 40 MG
60 TABLET ORAL ONCE
Refills: 0 | Status: COMPLETED | OUTPATIENT
Start: 2023-08-27 | End: 2023-08-27

## 2023-08-27 RX ORDER — SODIUM CHLORIDE 9 MG/ML
1000 INJECTION, SOLUTION INTRAVENOUS
Refills: 0 | Status: DISCONTINUED | OUTPATIENT
Start: 2023-08-27 | End: 2023-09-02

## 2023-08-27 RX ORDER — NITROGLYCERIN 6.5 MG
100 CAPSULE, EXTENDED RELEASE ORAL
Qty: 50 | Refills: 0 | Status: DISCONTINUED | OUTPATIENT
Start: 2023-08-27 | End: 2023-08-27

## 2023-08-27 RX ORDER — INSULIN GLARGINE 100 [IU]/ML
16 INJECTION, SOLUTION SUBCUTANEOUS AT BEDTIME
Refills: 0 | Status: DISCONTINUED | OUTPATIENT
Start: 2023-08-27 | End: 2023-08-30

## 2023-08-27 RX ORDER — VALPROIC ACID (AS SODIUM SALT) 250 MG/5ML
750 SOLUTION, ORAL ORAL DAILY
Refills: 0 | Status: DISCONTINUED | OUTPATIENT
Start: 2023-08-27 | End: 2023-09-02

## 2023-08-27 RX ORDER — IPRATROPIUM/ALBUTEROL SULFATE 18-103MCG
3 AEROSOL WITH ADAPTER (GRAM) INHALATION
Refills: 0 | Status: COMPLETED | OUTPATIENT
Start: 2023-08-27 | End: 2023-08-27

## 2023-08-27 RX ORDER — IPRATROPIUM/ALBUTEROL SULFATE 18-103MCG
3 AEROSOL WITH ADAPTER (GRAM) INHALATION EVERY 6 HOURS
Refills: 0 | Status: DISCONTINUED | OUTPATIENT
Start: 2023-08-27 | End: 2023-09-01

## 2023-08-27 RX ORDER — APIXABAN 2.5 MG/1
5 TABLET, FILM COATED ORAL EVERY 12 HOURS
Refills: 0 | Status: DISCONTINUED | OUTPATIENT
Start: 2023-08-27 | End: 2023-09-02

## 2023-08-27 RX ORDER — METOPROLOL TARTRATE 50 MG
25 TABLET ORAL
Refills: 0 | Status: DISCONTINUED | OUTPATIENT
Start: 2023-08-27 | End: 2023-09-02

## 2023-08-27 RX ORDER — FUROSEMIDE 40 MG
40 TABLET ORAL DAILY
Refills: 0 | Status: DISCONTINUED | OUTPATIENT
Start: 2023-08-27 | End: 2023-09-02

## 2023-08-27 RX ORDER — HEPARIN SODIUM 5000 [USP'U]/ML
5000 INJECTION INTRAVENOUS; SUBCUTANEOUS EVERY 12 HOURS
Refills: 0 | Status: DISCONTINUED | OUTPATIENT
Start: 2023-08-27 | End: 2023-08-27

## 2023-08-27 RX ORDER — DEXTROSE 50 % IN WATER 50 %
15 SYRINGE (ML) INTRAVENOUS ONCE
Refills: 0 | Status: DISCONTINUED | OUTPATIENT
Start: 2023-08-27 | End: 2023-09-02

## 2023-08-27 RX ADMIN — Medication 2: at 21:47

## 2023-08-27 RX ADMIN — INSULIN GLARGINE 16 UNIT(S): 100 INJECTION, SOLUTION SUBCUTANEOUS at 21:40

## 2023-08-27 RX ADMIN — LEVETIRACETAM 1500 MILLIGRAM(S): 250 TABLET, FILM COATED ORAL at 21:39

## 2023-08-27 RX ADMIN — Medication 1000 MILLIGRAM(S): at 21:39

## 2023-08-27 RX ADMIN — Medication 30 MICROGRAM(S)/MIN: at 13:26

## 2023-08-27 RX ADMIN — Medication 3 MILLILITER(S): at 13:55

## 2023-08-27 RX ADMIN — Medication 125 MILLIGRAM(S): at 13:52

## 2023-08-27 RX ADMIN — Medication 20 MILLIGRAM(S): at 21:39

## 2023-08-27 RX ADMIN — BUDESONIDE AND FORMOTEROL FUMARATE DIHYDRATE 2 PUFF(S): 160; 4.5 AEROSOL RESPIRATORY (INHALATION) at 21:45

## 2023-08-27 RX ADMIN — TAMSULOSIN HYDROCHLORIDE 0.4 MILLIGRAM(S): 0.4 CAPSULE ORAL at 21:39

## 2023-08-27 RX ADMIN — Medication 100 MICROGRAM(S)/MIN: at 13:49

## 2023-08-27 RX ADMIN — Medication 3 MILLILITER(S): at 13:53

## 2023-08-27 RX ADMIN — APIXABAN 5 MILLIGRAM(S): 2.5 TABLET, FILM COATED ORAL at 21:39

## 2023-08-27 RX ADMIN — PRIMIDONE 100 MILLIGRAM(S): 250 TABLET ORAL at 21:40

## 2023-08-27 RX ADMIN — Medication 3 MILLILITER(S): at 13:51

## 2023-08-27 RX ADMIN — Medication 60 MILLIGRAM(S): at 15:53

## 2023-08-27 RX ADMIN — Medication 40 MILLIGRAM(S): at 21:45

## 2023-08-27 RX ADMIN — ATORVASTATIN CALCIUM 80 MILLIGRAM(S): 80 TABLET, FILM COATED ORAL at 21:38

## 2023-08-27 RX ADMIN — SENNA PLUS 2 TABLET(S): 8.6 TABLET ORAL at 21:38

## 2023-08-27 NOTE — ED PROVIDER NOTE - CARE PLAN
Principal Discharge DX:	Acute hypercapnic respiratory failure  Secondary Diagnosis:	Acute pulmonary edema   1

## 2023-08-27 NOTE — ED PROVIDER NOTE - PHYSICAL EXAMINATION
General: male in acute respiratory distress, + retractions, BP significantly elevated >230/120 on arrival to ER, saturating in mid 80s on room air, tachypneic.   HEENT: Normocephalic, atraumatic. Moist mucous membranes. Oropharynx clear. No lymphadenopathy.  Eyes: No scleral icterus. EOMI. KAZ.  Neck:. Soft and supple. Full ROM without pain. No midline tenderness  Cardiac: Regular rate and regular rhythm. No murmurs, rubs, gallops. Peripheral pulses 2+ and symmetric. No LE edema.  Resp: +retractions, tachypneic. crackles toward bases, rhonchi generalized.   Abd: Soft, non-tender, non-distended. No guarding or rebound. +scar from prior feeding tube.   Back: Spine midline and non-tender. No CVA tenderness.    Skin: No rashes, abrasions, or lacerations.  Neuro:  Moves all extremities symmetrically, able to follow commands, arousable to verbal/painful stimuli.

## 2023-08-27 NOTE — ED ADULT NURSE NOTE - CHIEF COMPLAINT QUOTE
brought from [facility], witnessed possible syncopal episde. as per RN rebecca, they were cleaning the patient and became agitated, when they sat the patient up patient became unresponsive for about 2-3 mins, initially called code blue in [facility] but [facility] RN,  states that patient had pulses the whole time, patient appears tachypneic.

## 2023-08-27 NOTE — H&P ADULT - NSHPPHYSICALEXAM_GEN_ALL_CORE
PHYSICAL EXAMINATION:  Vital Signs Last 24 Hrs  T(C): 36.3 (27 Aug 2023 18:25), Max: 37.3 (27 Aug 2023 12:46)  T(F): 97.4 (27 Aug 2023 18:25), Max: 99.1 (27 Aug 2023 12:46)  HR: 76 (27 Aug 2023 18:25) (71 - 114)  BP: 183/95 (27 Aug 2023 18:25) (122/77 - 235/134)  BP(mean): 96 (27 Aug 2023 15:24) (96 - 96)  RR: 16 (27 Aug 2023 18:25) (14 - 28)  SpO2: 100% (27 Aug 2023 18:25) (98% - 100%)    Parameters below as of 27 Aug 2023 18:25  Patient On (Oxygen Delivery Method): BiPAP/CPAP    O2 Concentration (%): 30  CAPILLARY BLOOD GLUCOSE      POCT Blood Glucose.: 182 mg/dL (27 Aug 2023 12:48)      GENERAL: NAD, comfortable on BIPAP  ENMT: mucous membranes moist  NECK: supple, No JVD  CHEST/LUNG: clear to auscultation bilaterally; no rales, rhonchi, or wheezing b/l  HEART: normal S1, S2  ABDOMEN: BS+, soft, ND, NT   EXTREMITIES:  pulses palpable; no clubbing, cyanosis, or edema b/l LEs  NEURO: awake, alert, interactive; moves all extremities PHYSICAL EXAMINATION:  Vital Signs Last 24 Hrs  T(C): 36.3 (27 Aug 2023 18:25), Max: 37.3 (27 Aug 2023 12:46)  T(F): 97.4 (27 Aug 2023 18:25), Max: 99.1 (27 Aug 2023 12:46)  HR: 76 (27 Aug 2023 18:25) (71 - 114)  BP: 183/95 (27 Aug 2023 18:25) (122/77 - 235/134)  BP(mean): 96 (27 Aug 2023 15:24) (96 - 96)  RR: 16 (27 Aug 2023 18:25) (14 - 28)  SpO2: 100% (27 Aug 2023 18:25) (98% - 100%)    Parameters below as of 27 Aug 2023 18:25  Patient On (Oxygen Delivery Method): BiPAP/CPAP    O2 Concentration (%): 30  CAPILLARY BLOOD GLUCOSE      POCT Blood Glucose.: 182 mg/dL (27 Aug 2023 12:48)      GENERAL: NAD, comfortable on BIPAP, son at bedside, no cough or SOB, comfortable on BIPAP  ENMT: mucous membranes moist  NECK: supple, No JVD  CHEST/LUNG: clear to auscultation bilaterally; no rales, rhonchi, or wheezing b/l  HEART: normal S1, S2  ABDOMEN: BS+, soft, ND, NT   EXTREMITIES:  pulses palpable; no clubbing, cyanosis, or edema b/l LEs  NEURO: awake, alert, interactive; moves all extremities

## 2023-08-27 NOTE — PATIENT PROFILE ADULT - FALL HARM RISK - HARM RISK INTERVENTIONS
Assistance with ambulation/Assistance OOB with selected safe patient handling equipment/Communicate Risk of Fall with Harm to all staff/Discuss with provider need for PT consult/Monitor gait and stability/Reinforce activity limits and safety measures with patient and family/Tailored Fall Risk Interventions/Visual Cue: Yellow wristband and red socks/Bed in lowest position, wheels locked, appropriate side rails in place/Call bell, personal items and telephone in reach/Instruct patient to call for assistance before getting out of bed or chair/Non-slip footwear when patient is out of bed/[place] to call system/Physically safe environment - no spills, clutter or unnecessary equipment/Purposeful Proactive Rounding/Room/bathroom lighting operational, light cord in reach

## 2023-08-27 NOTE — ED ADULT TRIAGE NOTE - CHIEF COMPLAINT QUOTE
brought from [facility], witnessed possible syncopal episde. as per RN rebecca, they were cleaning the patient and became agitated, when they sat the patient up patient became unresponsive for about 2-3 mins, initially called code blue in [facility] but [facility] RN,  states that patient had pulses the whole time, patient appears tachypneic.

## 2023-08-27 NOTE — ED ADULT NURSE NOTE - NSFALLHARMRISKINTERV_ED_ALL_ED
Assistance OOB with selected safe patient handling equipment if applicable/Assistance with ambulation/Communicate risk of Fall with Harm to all staff, patient, and family/Monitor gait and stability/Provide visual cue: red socks, yellow wristband, yellow gown, etc/Reinforce activity limits and safety measures with patient and family/Bed in lowest position, wheels locked, appropriate side rails in place/Call bell, personal items and telephone in reach/Instruct patient to call for assistance before getting out of bed/chair/stretcher/Non-slip footwear applied when patient is off stretcher/[place] to call system/Physically safe environment - no spills, clutter or unnecessary equipment/Purposeful Proactive Rounding/Room/bathroom lighting operational, light cord in reach

## 2023-08-27 NOTE — H&P ADULT - HISTORY OF PRESENT ILLNESS
72 year old male PMH of trach now removed, PEG now removed, COPD, CHF, DM, seizure disorder, BPH to ER from [facility] today for SOB.   Denies fevers, CP, cough, abdominal pain or leg edema. Son at bedside, seen in ER on BIPAP, Responded well to Steroids, Lasix and Duonebs   in ER.     PMH: CABG 2014, 3 stents few years after CABG, cardiac arrest due to low blood sugar in the past.   72 year old male PMH of trach now removed, PEG now removed, COPD, CHF, DM, seizure disorder, BPH to ER from [facility] today for SOB. Denies fevers, CP, cough, abdominal pain or leg edema. Son at bedside, seen in ER on BIPAP, Responded well to Steroids, Lasix and Duonebs in ER.     PMH: CABG 2014, 3 stents few years after CABG, cardiac arrest due to low blood sugar in the past.   72 year old male PMH of trach now removed, PEG now removed, COPD, CHF, DM, PPM, seizure disorder, BPH to ER from [facility] today for SOB. Denies fevers, CP, cough, abdominal pain or leg edema. Son at bedside, seen in ER on BIPAP, Responded well to Steroids, Lasix and Duonebs in ER.     PMH: CABG 2014, 3 stents few years after CABG, cardiac arrest due to low blood sugar in the past.

## 2023-08-27 NOTE — H&P ADULT - ASSESSMENT
72 year old male PMH of trach now removed, PEG now removed, COPD, CHF, DM, seizure disorder, BPH to ER from [facility] today for SOB.   Denies fevers, CP, cough, abdominal pain or leg edema. Son at bedside, seen in ER on BIPAP, Responded well to Steroids, Lasix and Duonebs   in ER.     Plan:  Admit to tele for SOB from COPD exacerbation and mild CHF. BNP elevated at 3,124, trop normal range. Creatinine normal at .95.   Continue IVP lasix, duonebs and Solumedrol 20 mg every 8 hours. TTE in AM, eval EF. Likely wean off BIPAP in AM, continue for now at   18/8/50 %. CT chest ordered as well to eval bilateral effusions.     Continue all home meds: Flomax, VPA, Kepra change to PO form, on regular DM diet, Lipitor, Toprol, Eliquis and Primidone.     Continue Lantus increase to 16 units with Solumedrol. May need standing  Admelog with meals, SSC for now. Glucose 182 on arrival.      Has stage II sacral ulcer on arrival, dressed in ER by nurse, no need for wound care eval.      72 year old male PMH of trach now removed, PEG now removed, COPD, CHF, DM, seizure disorder, BPH to ER from [facility] today for SOB. Denies fevers, CP, cough, abdominal pain or leg edema. Son at bedside, seen in ER on BIPAP, Responded well to Steroids, Lasix and Duonebs in ER.     Plan:  Admit to tele for SOB from COPD exacerbation and mild CHF. BNP elevated at 3,124, trop normal range. Creatinine normal at .95.   Continue IVP lasix, duonebs and Solumedrol 20 mg every 8 hours. TTE in AM, eval EF. Likely wean off BIPAP in AM, continue for now at   18/8/50 %. CT chest ordered as well to eval bilateral effusions.     Continue all home meds: Flomax, VPA, Kepra change to PO form, on regular DM diet, Lipitor, Toprol, Eliquis and Primidone.     Continue Lantus increase to 16 units with Solumedrol. May need standing  Admelog with meals, SSC for now. Glucose 182 on arrival.      Has stage II sacral ulcer on arrival, dressed in ER by nurse, no need for wound care eval.

## 2023-08-27 NOTE — H&P ADULT - NSHPLABSRESULTS_GEN_ALL_CORE
LABS:                        14.1   16.46 )-----------( 171      ( 27 Aug 2023 13:40 )             43.2     08-27    134<L>  |  98  |  26<H>  ----------------------------<  203<H>  4.8   |  32<H>  |  0.95    Ca    8.2<L>      27 Aug 2023 13:40  Mg     2.1     08-27      PT/INR - ( 27 Aug 2023 14:32 )   PT: 11.3 sec;   INR: 0.94 ratio         PTT - ( 27 Aug 2023 14:32 )  PTT:24.6 sec  Urinalysis Basic - ( 27 Aug 2023 13:40 )    Color: x / Appearance: x / SG: x / pH: x  Gluc: 203 mg/dL / Ketone: x  / Bili: x / Urobili: x   Blood: x / Protein: x / Nitrite: x   Leuk Esterase: x / RBC: x / WBC x   Sq Epi: x / Non Sq Epi: x / Bacteria: x          RADIOLOGY & ADDITIONAL TESTS:

## 2023-08-27 NOTE — ED PROVIDER NOTE - OBJECTIVE STATEMENT
73 y/o M hx of CHF, copd , cardiac arrest w/ prior trach/peg , stroke sent from [facility] rehab for unresponsiveness. per son, states patient called him earlier today stating he was having trouble breathing. patient arrived in resus B for evaluation. son at bedside who provided collateral. per nurse from [facility] rehab, states patient suddenly became "unresponsive", denies any seizure like activity or spasms/clonus of the extremities during this episode. seems unsure of how long the duration was but states a few minutes.

## 2023-08-27 NOTE — PATIENT PROFILE ADULT - SURGICAL SITE INCISION
You have received a viral test for COVID-19. Below is education on quarantine per the CDC guidelines. For any symptoms, seek care from your PCP, call [phone] to establish care with a doctor, or go directly to an urgent care or the emergency room. Test results will take 2-7 days and will be sent to you in your [employer] account. If you test positive, you will be contacted via phone. If you test negative, the ONLY communication will be through [address] GO TO [employer] AND SIGN UP FOR [employer]  (LOWER LEFT OF THE HOME PAGE)  No test is 100%. If you have symptoms, you should follow the guidance of quarantine as previously stated. You can still be contagious if you have symptoms. Your [facility] will reach out to you if you have a positive result. They will provide you with a return to work date and note. If you were tested for a pre-op, then you should remain in quarantine until your procedure. How do I know if I need to be in quarantine? If you live in a community where COVID-19 is or might be spreading (currently, that is virtually everywhere in the [place])  Be alert for symptoms. Watch for fever, cough, shortness of breath, or other symptoms of COVID-19.  Take your temperature if symptoms develop.  Practice social distancing. Maintain 6 feet of distance from others and stay out of crowded places.  Follow CDC guidance if symptoms develop. If you feel healthy but:   Recently had close contact with a person with COVID-19 you need to Quarantine:   Stay home until 14 days after your last exposure.  Check your temperature twice a day and watch for symptoms of COVID-19.  If possible, stay away from people who are at higher-risk for getting very sick from COVID-19.   Stay Home and Monitor Your Health if you:   Have been diagnosed with COVID-19, or   Are waiting for test results, or   Have cough, fever, or shortness of breath, or symptoms of COVID-19      When You Can
no

## 2023-08-27 NOTE — ED PROVIDER NOTE - CLINICAL SUMMARY MEDICAL DECISION MAKING FREE TEXT BOX
73 y/o M hx of CHF, copd , cardiac arrest w/ prior trach/peg , stroke sent from [facility] rehab for unresponsiveness. per son, states patient rodriguez dhim earlier today stating he was having trouble breathing. patient arrived in resus B for evaluation, +retractions w/ increased work of breathing. BP >230/130 on arrival. crackles toward bases, B-lines diffusely w/ b/l effusions on sono. likely acute pulmonary edema (SCAPE) , possible mixed picture w/ copd exacerbation. nitroglycerin infusion started w/ bipap with significant improvement in work of breathing. ABg performed initially prior to bipap w/ repeat ABG after 1 hour on bipap showing significant improvement in acute hypercapnic respiratory failure. cxr showing likely acute chf / increased congestion. nebs/steroids given for possible copd component. nitroglycerin infusion paused given improvement in SBP/work of breathing. EKG RBBB, no signs of acute ischemia/stemi. admit to tele. patient is full code per son at bedside. 73 y/o M hx of CHF, copd , cardiac arrest w/ prior trach/peg , stroke sent from [facility] rehab for unresponsiveness. per son, states patient rodriguez dhim earlier today stating he was having trouble breathing. patient arrived in resus B for evaluation, +retractions w/ increased work of breathing. BP >230/130 on arrival. crackles toward bases, B-lines diffusely w/ b/l effusions on sono. likely acute pulmonary edema (SCAPE) , possible mixed picture w/ copd exacerbation. nitroglycerin infusion started w/ bipap with significant improvement in work of breathing. ABg performed initially prior to bipap w/ repeat ABG after 1 hour on bipap showing significant improvement in acute hypercapnic respiratory failure. cxr showing likely acute chf / increased congestion. nebs/steroids given for possible copd component. nitroglycerin infusion paused given improvement in SBP/work of breathing. EKG RBBB, no signs of acute ischemia/stemi. admit to tele. patient is full code per son at bedside.  patient endorsed to dr. jon hospitalist.

## 2023-08-27 NOTE — ED ADULT NURSE NOTE - OBJECTIVE STATEMENT
received er resc b from [facility] rehab after episode of unresponsivesness x 1-2 mins per [facility] rn pt was in bed with staff assisting when he became less responsive pt brought to er awake alert and responsive in respiratory distress with tachypnea labored breathing crackles audible b/l follows commands minimally verbal at baseline son at bedside states pt at baseline mentally at present skin warm and diaphoretic respiratory called for bipap initiation with sat =100%

## 2023-08-27 NOTE — ED ADULT TRIAGE NOTE - TEMPERATURE IN CELSIUS (DEGREES C)
"[facility] Note    Subjective:    Chief Complaint:   Chief Complaint   Patient presents with    [facility]    Hypertension       {[facility] Family [facility] Notes    My Last Note    Chart Review        Opioid Risk Ass       Change CC    Future appts :[id number]::"   "}274}    42 y.o. male presents for multiple issues.     The HPI and pertinent ROS is included in the Diagnostic Impression Remarks section at the end of the note. Please see below for further details.     The following portions of the patient's history were reviewed and updated as appropriate: allergies, current medications, past family history, past medical history, past social history, past surgical history and problem list.    He  has a past medical history of Anxiety, Depression, and Hypertension.  He  has no past surgical history on file.    He  reports that he has quit smoking. He has a 10.00 pack-year smoking history. He has quit using smokeless tobacco.  His smokeless tobacco use included chew. He reports current alcohol use of about 12.0 standard drinks of alcohol per week. He reports that he does not use drugs.  He family history includes COPD in his father; Depression in his mother; Heart disease in his paternal grandfather and paternal uncle; Hypertension in his father; Pancreatic cancer in his maternal grandfather.    Review of patient's allergies indicates:   Allergen Reactions    Diphenhydramine Other (See Comments)       Physical Examination  /78 (BP Location: Right arm, Patient Position: Sitting, BP Method: Large (Manual))   Pulse 77   Temp 98.8 °F (37.1 °C) (Oral)   Resp 16   Ht 5' 9" (1.753 m)   Wt 101 kg (222 lb 10.6 oz)   SpO2 98%   BMI 32.88 kg/m²    274}  Wt Readings from Last 3 Encounters:   01/19/22 101 kg (222 lb 10.6 oz)   07/21/21 110.6 kg (243 lb 13.3 oz)     BP Readings from Last 3 Encounters:   01/19/22 138/78   07/21/21 (!) 140/82                Estimated body mass index is 32.88 kg/m² as " "calculated from the following:    Height as of this encounter: 5' 9" (1.753 m).    Weight as of this encounter: 101 kg (222 lb 10.6 oz).     General appearance: alert, cooperative, no distress  Neck: no thyromegaly, no neck stiffness  Lungs: clear to auscultation, no wheezes, rales or rhonchi, symmetric air entry  Heart: normal rate, regular rhythm, normal S1, S2, no murmurs, rubs, clicks or gallops  Abdomen: soft, nontender, nondistended, no rigidity, rebound, or guarding.   Back: no point tenderness over spine  Extremities: peripheral pulses normal, no unilateral leg swelling or calf tenderness   Neurological:alert, oriented, normal speech, no new focal findings or movement disorder noted from baseline    Data reviewed 274}  Previous medical records reviewed and summarized in HPI.   Health Maintenance Due   Topic Date Due    TETANUS VACCINE  Never done    Influenza Vaccine (1) Never done    COVID-19 Vaccine (2 - Booster for Loyd series) 10/19/2021         Laboratory  274}  I have reviewed old labs below:  Lab Results   Component Value Date    WBC 5.34 07/27/2021    HGB 16.0 07/27/2021    HCT 46.2 07/27/2021    MCV 87 07/27/2021     07/27/2021     07/27/2021    K 4.4 07/27/2021     07/27/2021    CALCIUM 9.7 07/27/2021    CO2 23 07/27/2021     07/27/2021    BUN 18 07/27/2021    CREATININE 1.2 07/27/2021    ANIONGAP 10 07/27/2021    ESTGFRAFRICA >60.0 07/27/2021    EGFRNONAA >60.0 07/27/2021    PROT 7.5 07/27/2021    ALBUMIN 4.1 07/27/2021    BILITOT 0.5 07/27/2021    ALKPHOS 41 (L) 07/27/2021    ALT 71 (H) 07/27/2021    AST 62 (H) 07/27/2021    CHOL 151 07/27/2021    TRIG 255 (H) 07/27/2021    HDL 35 (L) 07/27/2021    LDLCALC 65.0 07/27/2021    HGBA1C 5.3 07/27/2021     Lab reviewed by me: Particular labs of significance that I will monitor, workup, or treat to improve are mentioned below in diagnostic impression remarks.  :[id number]}274}  Imaging/EKG: I have reviewed the pertinent " "results/findings and my personal findings are noted below in diagnostic impression remarks.  274}    Assessment/Plan  Dave Gonzalez is a 42 y.o. male who presents to clinic with:    1. Encounter for preventive health examination    2. Hard mass of abdomen    3. Essential hypertension    4. Gastroesophageal reflux disease, unspecified whether esophagitis present    5. Abnormal results of liver function studies        Diagnostic Impression Remarks + HPI     Documentation entered by me for this encounter may have been done in part using speech-recognition technology. Although I have made an effort to ensure accuracy, "sound like" errors may exist and should be interpreted in context.      Hard mass abdomen-patient reports in November he fell and a metal chair hit his abdomen and he reported some initial pain which is resolved but still has a hard mass on his abdomen that is nontender.  Patient reports it has been resolving but still present concerned no abdominal pain will get ultrasound to further assess could be hematoma is resolving or less likely hernia but nonpainful and no acute abdomen today.   Hypertension-needs improvement will slightly increase lisinopril from 20 mg to 30 mg   GERD-well controlled on PPI will continue along with diet modifications   Elevated liver enzymes-likely due to alcohol use will repeat liver enzymes recommend limit alcohol will check hep panel will also get ultrasound to look for fatty liver as well no pain currently    This is the extent of the patient's complaints at this present time. He denies chest pain upon exertion, dyspnea, nausea, vomiting, diaphoresis, and syncope. No pleuritic chest pain, unilateral leg swelling, calf tenderness, or calf pain.     Dave will return to clinic in a few months for further workup and reassessment or sooner as needed. He was instructed to call the clinic or go to the emergency department if his symptoms do not improve, worsens, or if new " "symptoms develop. As we discussed that symptoms could worsen over the next 24 hours he was advised that if any increased swelling, pain, or numbness arise to go immediately to the ED. Patient knows to call any time if an emergency arises. Shared decision making occurred and he verbalized understanding in agreement with this plan.     274}    BMI Goal    Counseled patient on his ideal body weight, health consequences of being obese and current recommendations including weekly exercise and a heart healthy diet.  He is aware that ideal BMI < 25  Estimated body mass index is 32.88 kg/m² as calculated from the following:    Height as of this encounter: 5' 9" (1.753 m).    Weight as of this encounter: 101 kg (222 lb 10.6 oz).     He was counseled about the importance of healthy dietary habits as well as routine physical activity and exercise for better health outcomes. I also discussed the importance of cancer screening.     Medication Monitoring    In today's visit, monitoring for drug toxicity was accomplished. Proper use of medications was also discussed.     I discussed imaging findings, diagnosis, possibilities, treatment options, medications, risks, and benefits. He had many questions regarding the options and long-term effects. All questions were answered. He expressed understanding after counseling regarding the diagnosis and recommendations. He was capable and demonstrated competence with understanding of these options. Shared decision making was performed resulting in him choosing the current treatment plan. Patient handout was given with instructions and recommendations. Advised the patient that if they become pregnant to alert us immediately to assess for medication changes.     I also discussed the importance of close follow up to discuss labs, change or modify his medications if needed, monitor side effects, and further evaluation of medical problems.     Additional workup planned: see labs ordered below.  "   See below for labs and meds ordered with associated diagnosis      1. Encounter for preventive health examination    2. Hard mass of abdomen  - US Abdomen Complete; Future    3. Essential hypertension  - lisinopriL (PRINIVIL,ZESTRIL) 30 MG tablet; Take 1 tablet (30 mg total) by mouth once daily.  Dispense: 30 tablet; Refill: 2    4. Gastroesophageal reflux disease, unspecified whether esophagitis present    5. Abnormal results of liver function studies  - Comprehensive Metabolic Panel; Future  - Hepatitis Panel, Acute; Future    Medication List with Changes/Refills   New Medications    LISINOPRIL (PRINIVIL,ZESTRIL) 30 MG TABLET    Take 1 tablet (30 mg total) by mouth once daily.   Current Medications    AMLODIPINE (NORVASC) 10 MG TABLET    Take 1 tablet (10 mg total) by mouth once daily.    BUPROPION (WELLBUTRIN XL) 150 MG TB24 TABLET    Take 1 tablet (150 mg total) by mouth once daily.    OMEPRAZOLE (PRILOSEC) 20 MG CAPSULE    Take 1 capsule (20 mg total) by mouth once daily.    SERTRALINE (ZOLOFT) 100 MG TABLET    Take 1 tablet (100 mg total) by mouth once daily.   Discontinued Medications    LISINOPRIL (PRINIVIL,ZESTRIL) 20 MG TABLET    Take 1 tablet (20 mg total) by mouth once daily.     Modified Medications    No medications on file       Tae Cruz MD   274}  01/19/2022     This note was completed with dictation software and grammatical errors may exist.    If you are due for any health screening(s) below please notify me so we can arrange them to be ordered and scheduled to maintain your health.     Health Maintenance Due   Topic Date Due    TETANUS VACCINE  Never done    Influenza Vaccine (1) Never done    COVID-19 Vaccine (2 - Booster for Loyd series) 10/19/2021        37.3

## 2023-08-27 NOTE — ED ADULT NURSE NOTE - BREATHING, MLM
Tachypnea/Labored Cimetidine Counseling:  I discussed with the patient the risks of Cimetidine including but not limited to gynecomastia, headache, diarrhea, nausea, drowsiness, arrhythmias, pancreatitis, skin rashes, psychosis, bone marrow suppression and kidney toxicity.

## 2023-08-27 NOTE — ED ADULT NURSE NOTE - NSICDXPASTMEDICALHX_GEN_ALL_CORE_FT
PAST MEDICAL HISTORY:  BPH (benign prostatic hyperplasia)     BPH without urinary obstruction     Cardiac arrest     Cardiac pacemaker     CHF (congestive heart failure)     Chronic obstructive pulmonary disease, unspecified COPD type     Diabetes mellitus     History of chronic ischemic heart disease     HLD (hyperlipidemia)     HTN (hypertension)     Lacunar infarction     Seizures     TBI (traumatic brain injury)

## 2023-08-28 LAB
A1C WITH ESTIMATED AVERAGE GLUCOSE RESULT: 7 % — HIGH (ref 4–5.6)
ESTIMATED AVERAGE GLUCOSE: 154 MG/DL — HIGH (ref 68–114)
GLUCOSE BLDC GLUCOMTR-MCNC: 249 MG/DL — HIGH (ref 70–99)
GLUCOSE BLDC GLUCOMTR-MCNC: 266 MG/DL — HIGH (ref 70–99)
GLUCOSE BLDC GLUCOMTR-MCNC: 302 MG/DL — HIGH (ref 70–99)
GLUCOSE BLDC GLUCOMTR-MCNC: 381 MG/DL — HIGH (ref 70–99)
LEVETIRACETAM SERPL-MCNC: 48.6 UG/ML — HIGH (ref 10–40)
TROPONIN I, HIGH SENSITIVITY RESULT: 73.1 NG/L — SIGNIFICANT CHANGE UP

## 2023-08-28 PROCEDURE — 99222 1ST HOSP IP/OBS MODERATE 55: CPT

## 2023-08-28 PROCEDURE — 99232 SBSQ HOSP IP/OBS MODERATE 35: CPT

## 2023-08-28 RX ORDER — PIPERACILLIN AND TAZOBACTAM 4; .5 G/20ML; G/20ML
3.38 INJECTION, POWDER, LYOPHILIZED, FOR SOLUTION INTRAVENOUS EVERY 8 HOURS
Refills: 0 | Status: DISCONTINUED | OUTPATIENT
Start: 2023-08-29 | End: 2023-09-02

## 2023-08-28 RX ORDER — PIPERACILLIN AND TAZOBACTAM 4; .5 G/20ML; G/20ML
3.38 INJECTION, POWDER, LYOPHILIZED, FOR SOLUTION INTRAVENOUS ONCE
Refills: 0 | Status: COMPLETED | OUTPATIENT
Start: 2023-08-29 | End: 2023-08-29

## 2023-08-28 RX ORDER — CHLORHEXIDINE GLUCONATE 213 G/1000ML
1 SOLUTION TOPICAL
Refills: 0 | Status: DISCONTINUED | OUTPATIENT
Start: 2023-08-28 | End: 2023-09-02

## 2023-08-28 RX ORDER — PIPERACILLIN AND TAZOBACTAM 4; .5 G/20ML; G/20ML
3.38 INJECTION, POWDER, LYOPHILIZED, FOR SOLUTION INTRAVENOUS ONCE
Refills: 0 | Status: COMPLETED | OUTPATIENT
Start: 2023-08-28 | End: 2023-08-29

## 2023-08-28 RX ORDER — PIPERACILLIN AND TAZOBACTAM 4; .5 G/20ML; G/20ML
3.38 INJECTION, POWDER, LYOPHILIZED, FOR SOLUTION INTRAVENOUS ONCE
Refills: 0 | Status: COMPLETED | OUTPATIENT
Start: 2023-08-28 | End: 2023-08-28

## 2023-08-28 RX ADMIN — Medication 6: at 17:14

## 2023-08-28 RX ADMIN — PIPERACILLIN AND TAZOBACTAM 200 GRAM(S): 4; .5 INJECTION, POWDER, LYOPHILIZED, FOR SOLUTION INTRAVENOUS at 17:14

## 2023-08-28 RX ADMIN — Medication 3 MILLILITER(S): at 05:24

## 2023-08-28 RX ADMIN — TAMSULOSIN HYDROCHLORIDE 0.4 MILLIGRAM(S): 0.4 CAPSULE ORAL at 21:34

## 2023-08-28 RX ADMIN — Medication 750 MILLIGRAM(S): at 11:51

## 2023-08-28 RX ADMIN — Medication 3 MILLILITER(S): at 11:20

## 2023-08-28 RX ADMIN — Medication 25 MILLIGRAM(S): at 08:36

## 2023-08-28 RX ADMIN — Medication 3 MILLILITER(S): at 00:06

## 2023-08-28 RX ADMIN — Medication 81 MILLIGRAM(S): at 11:51

## 2023-08-28 RX ADMIN — PRIMIDONE 100 MILLIGRAM(S): 250 TABLET ORAL at 11:52

## 2023-08-28 RX ADMIN — LEVETIRACETAM 1500 MILLIGRAM(S): 250 TABLET, FILM COATED ORAL at 05:48

## 2023-08-28 RX ADMIN — APIXABAN 5 MILLIGRAM(S): 2.5 TABLET, FILM COATED ORAL at 17:15

## 2023-08-28 RX ADMIN — LEVETIRACETAM 1500 MILLIGRAM(S): 250 TABLET, FILM COATED ORAL at 18:26

## 2023-08-28 RX ADMIN — Medication 20 MILLIGRAM(S): at 14:43

## 2023-08-28 RX ADMIN — BUDESONIDE AND FORMOTEROL FUMARATE DIHYDRATE 2 PUFF(S): 160; 4.5 AEROSOL RESPIRATORY (INHALATION) at 17:15

## 2023-08-28 RX ADMIN — CHLORHEXIDINE GLUCONATE 1 APPLICATION(S): 213 SOLUTION TOPICAL at 14:43

## 2023-08-28 RX ADMIN — Medication 40 MILLIGRAM(S): at 05:48

## 2023-08-28 RX ADMIN — Medication 8: at 11:50

## 2023-08-28 RX ADMIN — BUDESONIDE AND FORMOTEROL FUMARATE DIHYDRATE 2 PUFF(S): 160; 4.5 AEROSOL RESPIRATORY (INHALATION) at 05:47

## 2023-08-28 RX ADMIN — INSULIN GLARGINE 16 UNIT(S): 100 INJECTION, SOLUTION SUBCUTANEOUS at 21:22

## 2023-08-28 RX ADMIN — Medication 3 MILLILITER(S): at 23:16

## 2023-08-28 RX ADMIN — Medication 3 MILLILITER(S): at 17:29

## 2023-08-28 RX ADMIN — PRIMIDONE 100 MILLIGRAM(S): 250 TABLET ORAL at 21:35

## 2023-08-28 RX ADMIN — PREGABALIN 1000 MICROGRAM(S): 225 CAPSULE ORAL at 17:15

## 2023-08-28 RX ADMIN — Medication 1000 MILLIGRAM(S): at 21:35

## 2023-08-28 RX ADMIN — Medication 20 MILLIGRAM(S): at 05:48

## 2023-08-28 RX ADMIN — Medication 4: at 08:36

## 2023-08-28 RX ADMIN — ATORVASTATIN CALCIUM 80 MILLIGRAM(S): 80 TABLET, FILM COATED ORAL at 21:35

## 2023-08-28 RX ADMIN — POLYETHYLENE GLYCOL 3350 17 GRAM(S): 17 POWDER, FOR SOLUTION ORAL at 05:48

## 2023-08-28 RX ADMIN — SENNA PLUS 2 TABLET(S): 8.6 TABLET ORAL at 21:34

## 2023-08-28 RX ADMIN — POLYETHYLENE GLYCOL 3350 17 GRAM(S): 17 POWDER, FOR SOLUTION ORAL at 17:14

## 2023-08-28 RX ADMIN — APIXABAN 5 MILLIGRAM(S): 2.5 TABLET, FILM COATED ORAL at 05:48

## 2023-08-28 NOTE — PROGRESS NOTE ADULT - SUBJECTIVE AND OBJECTIVE BOX
CHIEF COMPLAINT/INTERVAL HISTORY:    Patient is a 72y old  Male who presents with a chief complaint of SOB (28 Aug 2023 14:05)      HPI:  72 year old male PMH of trach now removed, PEG now removed, COPD, CHF, DM, PPM, seizure disorder, BPH to ER from [facility] today for SOB. Denies fevers, CP, cough, abdominal pain or leg edema. Son at bedside, seen in ER on BIPAP, Responded well to Steroids, Lasix and Duonebs in ER.     PMH: CABG 2014, 3 stents few years after CABG, cardiac arrest due to low blood sugar in the past.   (27 Aug 2023 19:07)      SUBJECTIVE & OBJECTIVE: Pt seen and examined at bedside.     ICU Vital Signs Last 24 Hrs  T(C): 36.6 (28 Aug 2023 16:55), Max: 37.3 (27 Aug 2023 21:20)  T(F): 97.8 (28 Aug 2023 16:55), Max: 99.2 (27 Aug 2023 21:20)  HR: 68 (28 Aug 2023 17:34) (57 - 78)  BP: 98/56 (28 Aug 2023 16:55) (98/56 - 183/95)  BP(mean): 74 (28 Aug 2023 14:30) (74 - 89)  ABP: --  ABP(mean): --  RR: 18 (28 Aug 2023 16:55) (16 - 18)  SpO2: 99% (28 Aug 2023 17:34) (98% - 100%)    O2 Parameters below as of 28 Aug 2023 14:30  Patient On (Oxygen Delivery Method): nasal cannula  O2 Flow (L/min): 2            MEDICATIONS  (STANDING):  albuterol/ipratropium for Nebulization 3 milliLiter(s) Nebulizer every 6 hours  apixaban 5 milliGRAM(s) Oral every 12 hours  aspirin enteric coated 81 milliGRAM(s) Oral daily  atorvastatin 80 milliGRAM(s) Oral at bedtime  budesonide 160 MICROgram(s)/formoterol 4.5 MICROgram(s) Inhaler 2 Puff(s) Inhalation two times a day  chlorhexidine 2% Cloths 1 Application(s) Topical <User Schedule>  cyanocobalamin 1000 MICROGram(s) Oral daily  dextrose 5%. 1000 milliLiter(s) (50 mL/Hr) IV Continuous <Continuous>  dextrose 5%. 1000 milliLiter(s) (100 mL/Hr) IV Continuous <Continuous>  dextrose 50% Injectable 25 Gram(s) IV Push once  dextrose 50% Injectable 12.5 Gram(s) IV Push once  dextrose 50% Injectable 25 Gram(s) IV Push once  furosemide   Injectable 40 milliGRAM(s) IV Push daily  glucagon  Injectable 1 milliGRAM(s) IntraMuscular once  insulin glargine Injectable (LANTUS) 16 Unit(s) SubCutaneous at bedtime  insulin lispro (ADMELOG) corrective regimen sliding scale   SubCutaneous three times a day before meals  levETIRAcetam 1500 milliGRAM(s) Oral two times a day  methylPREDNISolone sodium succinate Injectable 40 milliGRAM(s) IV Push daily  metoprolol tartrate 25 milliGRAM(s) Oral two times a day  piperacillin/tazobactam IVPB.- 3.375 Gram(s) IV Intermittent once  polyethylene glycol 3350 17 Gram(s) Oral two times a day  primidone 100 milliGRAM(s) Oral daily  primidone 100 milliGRAM(s) Oral at bedtime  senna 2 Tablet(s) Oral at bedtime  tamsulosin 0.4 milliGRAM(s) Oral at bedtime  valproic acid 750 milliGRAM(s) Oral daily  valproic acid 1000 milliGRAM(s) Oral at bedtime    MEDICATIONS  (PRN):  dextrose Oral Gel 15 Gram(s) Oral once PRN Blood Glucose LESS THAN 70 milliGRAM(s)/deciliter        PHYSICAL EXAM:    GENERAL: NAD, well-groomed, well-developed  HEAD:  Atraumatic, Normocephalic  EYES: EOMI, PERRLA, conjunctiva and sclera clear  ENMT: Moist mucous membranes  NECK: Supple, No JVD  NERVOUS SYSTEM:  Alert & Oriented X3, Motor Strength 5/5 B/L upper and lower extremities; DTRs 2+ intact and symmetric  CHEST/LUNG: Clear to auscultation bilaterally; No rales, rhonchi, wheezing, or rubs  HEART: Regular rate and rhythm; No murmurs, rubs, or gallops  ABDOMEN: Soft, Nontender, Nondistended; Bowel sounds present  EXTREMITIES:  2+ Peripheral Pulses, No clubbing, cyanosis, or edema    LABS:                        14.1   16.46 )-----------( 171      ( 27 Aug 2023 13:40 )             43.2     08-27    134<L>  |  98  |  26<H>  ----------------------------<  203<H>  4.8   |  32<H>  |  0.95    Ca    8.2<L>      27 Aug 2023 13:40  Mg     2.1     08-27      PT/INR - ( 27 Aug 2023 14:32 )   PT: 11.3 sec;   INR: 0.94 ratio         PTT - ( 27 Aug 2023 14:32 )  PTT:24.6 sec  Urinalysis Basic - ( 27 Aug 2023 13:40 )    Color: x / Appearance: x / SG: x / pH: x  Gluc: 203 mg/dL / Ketone: x  / Bili: x / Urobili: x   Blood: x / Protein: x / Nitrite: x   Leuk Esterase: x / RBC: x / WBC x   Sq Epi: x / Non Sq Epi: x / Bacteria: x        CAPILLARY BLOOD GLUCOSE      POCT Blood Glucose.: 266 mg/dL (28 Aug 2023 17:01)  POCT Blood Glucose.: 302 mg/dL (28 Aug 2023 11:35)  POCT Blood Glucose.: 249 mg/dL (28 Aug 2023 08:13)  POCT Blood Glucose.: 200 mg/dL (27 Aug 2023 21:35)      RECENT CULTURES:      RADIOLOGY & ADDITIONAL TESTS:    Health Issues:  SYNCOPE    ACUTE HYPERCAPNIC RESPIRATORY FAILURE; ACUTE PULMONARY EDEMA    Acute hypercapnic respiratory failure        DVT/GI ppx  Discussed with pt @ bedside     CHIEF COMPLAINT/INTERVAL HISTORY:    Patient is a 72y old  Male who presents with a chief complaint of SOB (28 Aug 2023 14:05)      HPI:  72 year old male PMH of trach now removed, PEG now removed, COPD, CHF, DM, PPM, seizure disorder, BPH to ER from [facility] today for SOB. Denies fevers, CP, cough, abdominal pain or leg edema. Son at bedside, seen in ER on BIPAP, Responded well to Steroids, Lasix and Duonebs in ER.     PMH: CABG 2014, 3 stents few years after CABG, cardiac arrest due to low blood sugar in the past.   (27 Aug 2023 19:07)      SUBJECTIVE & OBJECTIVE: Pt seen and examined at bedside in am, son by bed side  pt gives hx chronic sob, now worsening for last few days   + cough  unable to bring up phlegm.     Vital Signs Last 24 Hrs  T(C): 36.6 (28 Aug 2023 16:55), Max: 37.3 (27 Aug 2023 21:20)  T(F): 97.8 (28 Aug 2023 16:55), Max: 99.2 (27 Aug 2023 21:20)  HR: 68 (28 Aug 2023 17:34) (57 - 78)  BP: 98/56 (28 Aug 2023 16:55) (98/56 - 183/95)  BP(mean): 74 (28 Aug 2023 14:30) (74 - 89)  ABP: --  ABP(mean): --  RR: 18 (28 Aug 2023 16:55) (16 - 18)  SpO2: 99% (28 Aug 2023 17:34) (98% - 100%)    O2 Parameters below as of 28 Aug 2023 14:30  Patient On (Oxygen Delivery Method): nasal cannula  O2 Flow (L/min): 2            MEDICATIONS  (STANDING):  albuterol/ipratropium for Nebulization 3 milliLiter(s) Nebulizer every 6 hours  apixaban 5 milliGRAM(s) Oral every 12 hours  aspirin enteric coated 81 milliGRAM(s) Oral daily  atorvastatin 80 milliGRAM(s) Oral at bedtime  budesonide 160 MICROgram(s)/formoterol 4.5 MICROgram(s) Inhaler 2 Puff(s) Inhalation two times a day  chlorhexidine 2% Cloths 1 Application(s) Topical <User Schedule>  cyanocobalamin 1000 MICROGram(s) Oral daily  dextrose 5%. 1000 milliLiter(s) (50 mL/Hr) IV Continuous <Continuous>  dextrose 5%. 1000 milliLiter(s) (100 mL/Hr) IV Continuous <Continuous>  dextrose 50% Injectable 25 Gram(s) IV Push once  dextrose 50% Injectable 12.5 Gram(s) IV Push once  dextrose 50% Injectable 25 Gram(s) IV Push once  furosemide   Injectable 40 milliGRAM(s) IV Push daily  glucagon  Injectable 1 milliGRAM(s) IntraMuscular once  insulin glargine Injectable (LANTUS) 16 Unit(s) SubCutaneous at bedtime  insulin lispro (ADMELOG) corrective regimen sliding scale   SubCutaneous three times a day before meals  levETIRAcetam 1500 milliGRAM(s) Oral two times a day  methylPREDNISolone sodium succinate Injectable 40 milliGRAM(s) IV Push daily  metoprolol tartrate 25 milliGRAM(s) Oral two times a day  piperacillin/tazobactam IVPB.- 3.375 Gram(s) IV Intermittent once  polyethylene glycol 3350 17 Gram(s) Oral two times a day  primidone 100 milliGRAM(s) Oral daily  primidone 100 milliGRAM(s) Oral at bedtime  senna 2 Tablet(s) Oral at bedtime  tamsulosin 0.4 milliGRAM(s) Oral at bedtime  valproic acid 750 milliGRAM(s) Oral daily  valproic acid 1000 milliGRAM(s) Oral at bedtime    MEDICATIONS  (PRN):  dextrose Oral Gel 15 Gram(s) Oral once PRN Blood Glucose LESS THAN 70 milliGRAM(s)/deciliter        PHYSICAL EXAM:    GENERAL: NAD,  well-developed, on NC  HEAD:  Atraumatic, Normocephalic  EYES: EOMI, PERRLA, conjunctiva and sclera clear  ENMT: Moist mucous membranes  NECK: Supple  NERVOUS SYSTEM:  Alert & Oriented X3, follows commands, dysarthria chronic per son since cardiac arrest  CHEST/LUNG: generalized decreased bs b/l  HEART: Regular rate and rhythm;  ABDOMEN: Soft, Nontender,  Bowel sounds present  EXTREMITIES: no cyanosis, or edema    LABS:                        14.1   16.46 )-----------( 171      ( 27 Aug 2023 13:40 )             43.2     08-27    134<L>  |  98  |  26<H>  ----------------------------<  203<H>  4.8   |  32<H>  |  0.95    Ca    8.2<L>      27 Aug 2023 13:40  Mg     2.1     08-27      PT/INR - ( 27 Aug 2023 14:32 )   PT: 11.3 sec;   INR: 0.94 ratio         PTT - ( 27 Aug 2023 14:32 )  PTT:24.6 sec  Urinalysis Basic - ( 27 Aug 2023 13:40 )    Color: x / Appearance: x / SG: x / pH: x  Gluc: 203 mg/dL / Ketone: x  / Bili: x / Urobili: x   Blood: x / Protein: x / Nitrite: x   Leuk Esterase: x / RBC: x / WBC x   Sq Epi: x / Non Sq Epi: x / Bacteria: x        CAPILLARY BLOOD GLUCOSE      POCT Blood Glucose.: 266 mg/dL (28 Aug 2023 17:01)  POCT Blood Glucose.: 302 mg/dL (28 Aug 2023 11:35)  POCT Blood Glucose.: 249 mg/dL (28 Aug 2023 08:13)  POCT Blood Glucose.: 200 mg/dL (27 Aug 2023 21:35)

## 2023-08-28 NOTE — CONSULT NOTE ADULT - SUBJECTIVE AND OBJECTIVE BOX
CHIEF COMPLAINT/ REASON FOR VISIT  .. Patient was seen to address the  issue listed under PROBLEM LIST which is located toward bottom of this note     BRANDON CAMARILLO    [facility] [address]    Allergies    No Known Allergies    Intolerances        PAST MEDICAL & SURGICAL HISTORY:  HTN (hypertension)      HLD (hyperlipidemia)      Diabetes mellitus      Lacunar infarction      BPH (benign prostatic hyperplasia)      CHF (congestive heart failure)      Chronic obstructive pulmonary disease, unspecified COPD type      BPH without urinary obstruction      History of chronic ischemic heart disease      Cardiac pacemaker      Cardiac arrest      Seizures      TBI (traumatic brain injury)      S/P CABG (coronary artery bypass graft)  2014          FAMILY HISTORY:      Home Medications:  apixaban 5 mg oral tablet: 1 tab(s) orally every 12 hours (27 Aug 2023 13:06)  aspirin 81 mg oral delayed release tablet: 1 tab(s) orally once a day (27 Aug 2023 13:06)  cyanocobalamin 1000 mcg oral tablet: 1 tab(s) orally once a day (27 Aug 2023 13:06)  insulin glargine 100 units/mL subcutaneous solution: 10 unit(s) subcutaneous once a day (at bedtime) (27 Aug 2023 13:06)  levETIRAcetam 100 mg/mL oral solution: 15 milliliter(s) orally 2 times a day (27 Aug 2023 13:06)  Liquifilm Tears preserved ophthalmic solution: 1 drop(s) to each affected eye 2 times a day (27 Aug 2023 13:06)  metoprolol tartrate 25 mg oral tablet: 1 tab(s) orally 2 times a day (27 Aug 2023 13:06)  Multiple Vitamins with Minerals oral liquid: 1  orally once a day (27 Aug 2023 13:06)  primidone 50 mg oral tablet: 1 orally (27 Aug 2023 13:05)  tamsulosin 0.4 mg oral capsule: 1 cap(s) orally once a day (27 Aug 2023 13:06)  valproic acid 250 mg/5 mL oral liquid: 750 mg orally or PEG in AM  1000mg orally or PEG in the evening  (27 Aug 2023 13:06)      MEDICATIONS  (STANDING):  albuterol/ipratropium for Nebulization 3 milliLiter(s) Nebulizer every 6 hours  apixaban 5 milliGRAM(s) Oral every 12 hours  aspirin enteric coated 81 milliGRAM(s) Oral daily  atorvastatin 80 milliGRAM(s) Oral at bedtime  budesonide 160 MICROgram(s)/formoterol 4.5 MICROgram(s) Inhaler 2 Puff(s) Inhalation two times a day  chlorhexidine 2% Cloths 1 Application(s) Topical <User Schedule>  cyanocobalamin 1000 MICROGram(s) Oral daily  dextrose 5%. 1000 milliLiter(s) (100 mL/Hr) IV Continuous <Continuous>  dextrose 5%. 1000 milliLiter(s) (50 mL/Hr) IV Continuous <Continuous>  dextrose 50% Injectable 25 Gram(s) IV Push once  dextrose 50% Injectable 12.5 Gram(s) IV Push once  dextrose 50% Injectable 25 Gram(s) IV Push once  furosemide   Injectable 40 milliGRAM(s) IV Push daily  glucagon  Injectable 1 milliGRAM(s) IntraMuscular once  insulin glargine Injectable (LANTUS) 16 Unit(s) SubCutaneous at bedtime  insulin lispro (ADMELOG) corrective regimen sliding scale   SubCutaneous three times a day before meals  levETIRAcetam 1500 milliGRAM(s) Oral two times a day  methylPREDNISolone sodium succinate Injectable 20 milliGRAM(s) IV Push every 8 hours  metoprolol tartrate 25 milliGRAM(s) Oral two times a day  polyethylene glycol 3350 17 Gram(s) Oral two times a day  primidone 100 milliGRAM(s) Oral daily  primidone 100 milliGRAM(s) Oral at bedtime  senna 2 Tablet(s) Oral at bedtime  tamsulosin 0.4 milliGRAM(s) Oral at bedtime  valproic acid 750 milliGRAM(s) Oral daily  valproic acid 1000 milliGRAM(s) Oral at bedtime    MEDICATIONS  (PRN):  dextrose Oral Gel 15 Gram(s) Oral once PRN Blood Glucose LESS THAN 70 milliGRAM(s)/deciliter      Diet, Consistent Carbohydrate/No Snacks (08-27-23 @ 19:11) [Active]          Vital Signs Last 24 Hrs  T(C): 36.7 (28 Aug 2023 04:42), Max: 37.3 (27 Aug 2023 21:20)  T(F): 98 (28 Aug 2023 04:42), Max: 99.2 (27 Aug 2023 21:20)  HR: 70 (28 Aug 2023 08:54) (57 - 78)  BP: 124/71 (28 Aug 2023 08:35) (100/62 - 183/95)  BP(mean): 89 (28 Aug 2023 08:35) (78 - 96)  RR: 17 (28 Aug 2023 05:50) (14 - 24)  SpO2: 100% (28 Aug 2023 08:54) (98% - 100%)    Parameters below as of 28 Aug 2023 06:16  Patient On (Oxygen Delivery Method): nasal cannula          08-27-23 @ 07:01  -  08-28-23 @ 07:00  --------------------------------------------------------  IN: 550 mL / OUT: 900 mL / NET: -350 mL    08-28-23 @ 07:01  -  08-28-23 @ 14:05  --------------------------------------------------------  IN: 800 mL / OUT: 0 mL / NET: 800 mL              LABS:                        14.1   16.46 )-----------( 171      ( 27 Aug 2023 13:40 )             43.2     08-27    134<L>  |  98  |  26<H>  ----------------------------<  203<H>  4.8   |  32<H>  |  0.95    Ca    8.2<L>      27 Aug 2023 13:40  Mg     2.1     08-27      PT/INR - ( 27 Aug 2023 14:32 )   PT: 11.3 sec;   INR: 0.94 ratio         PTT - ( 27 Aug 2023 14:32 )  PTT:24.6 sec  Urinalysis Basic - ( 27 Aug 2023 13:40 )    Color: x / Appearance: x / SG: x / pH: x  Gluc: 203 mg/dL / Ketone: x  / Bili: x / Urobili: x   Blood: x / Protein: x / Nitrite: x   Leuk Esterase: x / RBC: x / WBC x   Sq Epi: x / Non Sq Epi: x / Bacteria: x        ABG - ( 27 Aug 2023 14:44 )  pH, Arterial: 7.32  pH, Blood: x     /  pCO2: 60    /  pO2: 75    / HCO3: 31    / Base Excess: 3.1   /  SaO2: 94.8                WBC:  WBC Count: 16.46 K/uL (08-27 @ 13:40)  WBC Count: 8.97 K/uL (08-24 @ 16:31)      MICROBIOLOGY:  RECENT CULTURES:              PT/INR - ( 27 Aug 2023 14:32 )   PT: 11.3 sec;   INR: 0.94 ratio         PTT - ( 27 Aug 2023 14:32 )  PTT:24.6 sec    Sodium:  Sodium: 134 mmol/L (08-27 @ 13:40)  Sodium: 138 mmol/L (08-24 @ 16:31)      0.95 mg/dL 08-27 @ 13:40  0.83 mg/dL 08-24 @ 16:31      Hemoglobin:  Hemoglobin: 14.1 g/dL (08-27 @ 13:40)  Hemoglobin: 13.7 g/dL (08-24 @ 16:31)      Platelets: Platelet Count - Automated: 171 K/uL (08-27 @ 13:40)  Platelet Count - Automated: 185 K/uL (08-24 @ 16:31)          Urinalysis Basic - ( 27 Aug 2023 13:40 )    Color: x / Appearance: x / SG: x / pH: x  Gluc: 203 mg/dL / Ketone: x  / Bili: x / Urobili: x   Blood: x / Protein: x / Nitrite: x   Leuk Esterase: x / RBC: x / WBC x   Sq Epi: x / Non Sq Epi: x / Bacteria: x        RADIOLOGY & ADDITIONAL STUDIES:      MICROBIOLOGY:  RECENT CULTURES:

## 2023-08-28 NOTE — PROGRESS NOTE ADULT - ASSESSMENT
72 year old male PMH of trach  s/p decannulation, s/p PEG removal,  COPD, CHF, DM, seizure disorder, BPH came from [facility] for SOB.In ED placed on Bipap, given Steroids, Lasix and Duonebs in ER.     acute on chronic hypoxic/ hypercarbic resp. failure  seen by pulm- start iv zosyn- check blood cx, Urine legionella Ag  steroids, duonebs  lasix  await CTC  FU CT chest- if it shows Pleural eff then we will hold eliquis for thoraco per Dr. Crawford      c/w all home meds- Flomax, VPA, Kepra change to PO form, on regular DM diet, Lipitor, Toprol, Eliquis and Primidone.     DM2- uncontrolled- Monitor fsbs/ Lantus/ ISS    stage II sacral ulcer POA- wound care    eliquis for dvt ppx 72 year old male PMH of trach  s/p decannulation, s/p PEG removal,  COPD, CHF, DM, seizure disorder, BPH came from [facility] for SOB.In ED placed on Bipap, given Steroids, Lasix and Duonebs in ER.     acute on chronic hypoxic/ hypercarbic resp. failure  seen by pulm- start iv zosyn- check blood cx, Urine legionella Ag  steroids, duonebs  lasix  await CTC  FU CT chest- if it shows Pleural eff then we will hold eliquis for thoraco per Dr. Crawford      c/w all home meds- Flomax, VPA, Kepra change to PO form, on regular DM diet, Lipitor, Toprol, Eliquis and Primidone.     DM2- uncontrolled- Monitor fsbs/ Lantus/ ISS    stage II sacral ulcer POA- wound care    eliquis for dvt ppx    d/w pt and son by bed side.

## 2023-08-28 NOTE — CONSULT NOTE ADULT - ASSESSMENT
Initial evaluation/Pulmonary Critical Care consultation requested on 8/28/2023  by Dr SARABIA   from Dr Crawford   Patient examined chart reviewed    HOSPITAL ADMISSION   PATIENT CAME  FROM (if information available)      REASON FOR VISIT  .. Management of problems listed below      PHYSICAL EXAM    HEENT Unremarkable  atraumatic   RESP Fair air entry  Harsh breath sound   CARDIAC S1 S2 No S3     NO JVD    ABDOMEN No hepatosplenomegaly   PEDAL EDEMA present No calf tenderness  NO rash       GENERAL DATA .     GOC.    .. 8/28/2023 full code   ICU STAY.   .. none  COVID.   ..    BEST PRACTICE ISSUES.    HOB ELEVATN.   .. Yes  DVT PPLX.   ..  8/27 apixaba 5.2     SPEECH SWALLOW RECOMMENDATIONS.    ..        DIET.    ..  8/28/2023 cons carb   IV fl.  ..   STRESS ULCER PPLX.   ..      INFECTION PPLX.   ..   8/28 chlorhexidine 2%   ALLGY.  ..   nka                      WT.  ..  8/28/2023 73  BMI.  ..    8/28/2023 27  PROCEDURES.  ..     ABGS.   . 8/27/2023 2p 18/8/30 732/60/75   . 8/27/20231 1p100% nrb 732/112/111     VS/ PO/IO/ VENT/ DRIPS.   8/28/2023 71 120/70   8/28/2023 2l 98%         DOA C/C.        . 8/27/2023 From [facility] 72 m winess d loc 2-3 min while being cleaned then became responsive  called code blue in [facility]         INITIAL PRESENTATION.   . 8/27/2023 72 year old male PMH of trach now removed, PEG now removed, COPD, CHF, DM, PPM, seizure disorder, BPH to ER from [facility] today for SOB. Denies fevers, CP, cough, abdominal pain or leg edema. Son at bedside, seen in ER on BIPAP, Responded well to Steroids, Lasix and Duonebs in ER.   . 8/28/2023 Pulm consultd   PMH.   . CABG 2014,   . 3 stents few years after CABG,   . s/p trach/PEG on 12/5 now decannulated both  . cardiac arrest due to low blood sugar in the past.  . DVT 12/12/2022 l Subclav throm    HOME MEDS.   COURSE.     PROBLEMS/ASSESSMENT/RECOMMENDATIONS (A/R).  PULMONARY.  . GAS EXCHANGE.  .. A/R.   .. Monitor pulse oximetry and target po 90-95%  .. Recommend noct bpap as he has hypercapnia and may use bpap during day   .... 1) if short of breath or  .... 2)  increased wob or  .... 3) increasing lethargy sec to hypercapnia     . COPD   .. 8/28/2023 duoneb  .. 8/28/2023 symbicort    .. 8/27 solumed 20.3   .. 8/28/2023 Change to solumed 40 and taper off over 5d     INFECTION.  . 1) Aspn pneum 8/27  .. w 8/27 w 16   . cxr 8/27/2023   .... l cardiac device and sternotomy wires   .... New small to mod l pl effs   .... Tr r pl effs   .... mildly increased is lung markings   .. A/R  .. Has leukocytosis Poss aspn pneum Suggest zosyn  Follow adeel leavitt sp cs   .. speech eval   .. 8/28/2023 dw Dr Sarabia agrees with zosyn      . A fib  .. 8/27 apixaba 5.2   .. 8/27 metoprolol 25.2  .. 8/28/2023 cont rate control and avnb     . CAD   .. Tr 8/28 tr 73   .. 8/27 ASA 81   .. 8/28/2023 atorvastat 80  .. cont rx     . CHF   .. bnp 8/27/2023 bnp 3124   . 11/14/2022 echo mod decr segmental lvsf apical lateral apiccal ant segment are abn takotsubo cmpthy pasp 42 .  .. 8/28/2023 lasix 40   .. Check echo     HEMAT   .. Hb 8/27/2023 Hb 14   .. inr 8/27 .94   .. Monitor     RENAL   .. Na 8/27 Na 134   .. Monitor     . Syncope   .. 8/27 Suggest ct h neuro eval     . Seizures   .. 8/27 keppra 1500.2     MAIN ISSUES.  . Hypercapnic resp filure 8/27   . Recommend noct bpap as he has hypercapnia and may use bpap during day   .... 1) if short of breath or  .... 2)  increased wob or  .... 3) increasing lethargy sec to hypercapnia     . l Pl effsn 8/27 cxr   .. Await ct ch  will likely need diagnostic thora     . Pneumonia   .. Has leukocytosis Poss aspn pneum Suggest zosyn  Follow adeel leavitt sp cs   .. speech eval     . COPD 8/28 duoneb symbicort  .. 8/28/2023 Change to solumed 40 and taper off over 5d     . A fib 8/27 apixaba 5.2  rate control and ac     . Syncope 8/27 Suggest ct h neuro eval     TIME SPENT.   . Over 55 minutes aggregate care time spent on encounter; activities included   direct patient care, counseling and/or coordinating care reviewing notes, lab data/ imaging , discussion with multidisciplinary team/ patient  /family and explaining in detail risks, benefits, alternatives  of the recommendations     MARQUISE TAYLOR 72 m 8/28/2023 1948 DR OMID SARABIA

## 2023-08-29 DIAGNOSIS — J96.01 ACUTE RESPIRATORY FAILURE WITH HYPOXIA: ICD-10-CM

## 2023-08-29 DIAGNOSIS — E11.65 TYPE 2 DIABETES MELLITUS WITH HYPERGLYCEMIA: ICD-10-CM

## 2023-08-29 DIAGNOSIS — J96.21 ACUTE AND CHRONIC RESPIRATORY FAILURE WITH HYPOXIA: ICD-10-CM

## 2023-08-29 DIAGNOSIS — J90 PLEURAL EFFUSION, NOT ELSEWHERE CLASSIFIED: ICD-10-CM

## 2023-08-29 LAB
ALBUMIN SERPL ELPH-MCNC: 2.6 G/DL — LOW (ref 3.3–5)
ALP SERPL-CCNC: 78 U/L — SIGNIFICANT CHANGE UP (ref 40–120)
ALT FLD-CCNC: 37 U/L — SIGNIFICANT CHANGE UP (ref 12–78)
ANION GAP SERPL CALC-SCNC: 5 MMOL/L — SIGNIFICANT CHANGE UP (ref 5–17)
AST SERPL-CCNC: 18 U/L — SIGNIFICANT CHANGE UP (ref 15–37)
BASE EXCESS BLDA CALC-SCNC: 9.1 MMOL/L — HIGH (ref -2–3)
BILIRUB SERPL-MCNC: 0.3 MG/DL — SIGNIFICANT CHANGE UP (ref 0.2–1.2)
BLOOD GAS COMMENTS ARTERIAL: SIGNIFICANT CHANGE UP
BUN SERPL-MCNC: 44 MG/DL — HIGH (ref 7–23)
CALCIUM SERPL-MCNC: 8.1 MG/DL — LOW (ref 8.5–10.1)
CHLORIDE SERPL-SCNC: 94 MMOL/L — LOW (ref 96–108)
CO2 BLDA-SCNC: 37 MMOL/L — HIGH (ref 19–24)
CO2 SERPL-SCNC: 36 MMOL/L — HIGH (ref 22–31)
CREAT SERPL-MCNC: 1.37 MG/DL — HIGH (ref 0.5–1.3)
EGFR: 54 ML/MIN/1.73M2 — LOW
GLUCOSE BLDC GLUCOMTR-MCNC: 290 MG/DL — HIGH (ref 70–99)
GLUCOSE BLDC GLUCOMTR-MCNC: 363 MG/DL — HIGH (ref 70–99)
GLUCOSE BLDC GLUCOMTR-MCNC: 384 MG/DL — HIGH (ref 70–99)
GLUCOSE BLDC GLUCOMTR-MCNC: 426 MG/DL — HIGH (ref 70–99)
GLUCOSE BLDC GLUCOMTR-MCNC: 485 MG/DL — CRITICAL HIGH (ref 70–99)
GLUCOSE SERPL-MCNC: 332 MG/DL — HIGH (ref 70–99)
GRAM STN FLD: SIGNIFICANT CHANGE UP
HCO3 BLDA-SCNC: 35 MMOL/L — HIGH (ref 21–28)
HCT VFR BLD CALC: 34.3 % — LOW (ref 39–50)
HGB BLD-MCNC: 11.8 G/DL — LOW (ref 13–17)
HOROWITZ INDEX BLDA+IHG-RTO: 28 — SIGNIFICANT CHANGE UP
INR BLD: 0.94 RATIO — SIGNIFICANT CHANGE UP (ref 0.85–1.18)
LEGIONELLA AG UR QL: NEGATIVE — SIGNIFICANT CHANGE UP
MCHC RBC-ENTMCNC: 30.7 PG — SIGNIFICANT CHANGE UP (ref 27–34)
MCHC RBC-ENTMCNC: 34.4 G/DL — SIGNIFICANT CHANGE UP (ref 32–36)
MCV RBC AUTO: 89.3 FL — SIGNIFICANT CHANGE UP (ref 80–100)
MRSA PCR RESULT.: SIGNIFICANT CHANGE UP
NRBC # BLD: 0 /100 WBCS — SIGNIFICANT CHANGE UP (ref 0–0)
PCO2 BLDA: 53 MMHG — HIGH (ref 32–46)
PH BLDA: 7.43 — SIGNIFICANT CHANGE UP (ref 7.35–7.45)
PHOSPHATE SERPL-MCNC: 3.6 MG/DL — SIGNIFICANT CHANGE UP (ref 2.5–4.5)
PLATELET # BLD AUTO: 170 K/UL — SIGNIFICANT CHANGE UP (ref 150–400)
PO2 BLDA: 116 MMHG — HIGH (ref 83–108)
POTASSIUM SERPL-MCNC: 3.8 MMOL/L — SIGNIFICANT CHANGE UP (ref 3.5–5.3)
POTASSIUM SERPL-SCNC: 3.8 MMOL/L — SIGNIFICANT CHANGE UP (ref 3.5–5.3)
PROCALCITONIN SERPL-MCNC: 0.4 NG/ML — HIGH (ref 0.02–0.1)
PROT SERPL-MCNC: 6.3 GM/DL — SIGNIFICANT CHANGE UP (ref 6–8.3)
PROTHROM AB SERPL-ACNC: 11.3 SEC — SIGNIFICANT CHANGE UP (ref 9.5–13)
RBC # BLD: 3.84 M/UL — LOW (ref 4.2–5.8)
RBC # FLD: 15.8 % — HIGH (ref 10.3–14.5)
S AUREUS DNA NOSE QL NAA+PROBE: SIGNIFICANT CHANGE UP
SAO2 % BLDA: 98.3 % — HIGH (ref 94–98)
SODIUM SERPL-SCNC: 135 MMOL/L — SIGNIFICANT CHANGE UP (ref 135–145)
SPECIMEN SOURCE: SIGNIFICANT CHANGE UP
WBC # BLD: 8.43 K/UL — SIGNIFICANT CHANGE UP (ref 3.8–10.5)
WBC # FLD AUTO: 8.43 K/UL — SIGNIFICANT CHANGE UP (ref 3.8–10.5)

## 2023-08-29 PROCEDURE — 99233 SBSQ HOSP IP/OBS HIGH 50: CPT

## 2023-08-29 PROCEDURE — 99232 SBSQ HOSP IP/OBS MODERATE 35: CPT

## 2023-08-29 PROCEDURE — 71250 CT THORAX DX C-: CPT | Mod: 26

## 2023-08-29 PROCEDURE — 93306 TTE W/DOPPLER COMPLETE: CPT | Mod: 26

## 2023-08-29 RX ORDER — BENZOCAINE AND MENTHOL 5; 1 G/100ML; G/100ML
1 LIQUID ORAL EVERY 12 HOURS
Refills: 0 | Status: COMPLETED | OUTPATIENT
Start: 2023-08-29 | End: 2023-09-01

## 2023-08-29 RX ADMIN — INSULIN GLARGINE 16 UNIT(S): 100 INJECTION, SOLUTION SUBCUTANEOUS at 21:15

## 2023-08-29 RX ADMIN — Medication 40 MILLIGRAM(S): at 05:18

## 2023-08-29 RX ADMIN — PIPERACILLIN AND TAZOBACTAM 25 GRAM(S): 4; .5 INJECTION, POWDER, LYOPHILIZED, FOR SOLUTION INTRAVENOUS at 07:37

## 2023-08-29 RX ADMIN — Medication 750 MILLIGRAM(S): at 11:31

## 2023-08-29 RX ADMIN — BUDESONIDE AND FORMOTEROL FUMARATE DIHYDRATE 2 PUFF(S): 160; 4.5 AEROSOL RESPIRATORY (INHALATION) at 05:19

## 2023-08-29 RX ADMIN — ATORVASTATIN CALCIUM 80 MILLIGRAM(S): 80 TABLET, FILM COATED ORAL at 21:17

## 2023-08-29 RX ADMIN — PRIMIDONE 100 MILLIGRAM(S): 250 TABLET ORAL at 11:32

## 2023-08-29 RX ADMIN — Medication 3 MILLILITER(S): at 17:12

## 2023-08-29 RX ADMIN — Medication 81 MILLIGRAM(S): at 11:32

## 2023-08-29 RX ADMIN — BENZOCAINE AND MENTHOL 1 LOZENGE: 5; 1 LIQUID ORAL at 17:15

## 2023-08-29 RX ADMIN — BUDESONIDE AND FORMOTEROL FUMARATE DIHYDRATE 2 PUFF(S): 160; 4.5 AEROSOL RESPIRATORY (INHALATION) at 17:17

## 2023-08-29 RX ADMIN — PIPERACILLIN AND TAZOBACTAM 25 GRAM(S): 4; .5 INJECTION, POWDER, LYOPHILIZED, FOR SOLUTION INTRAVENOUS at 13:10

## 2023-08-29 RX ADMIN — PRIMIDONE 100 MILLIGRAM(S): 250 TABLET ORAL at 21:23

## 2023-08-29 RX ADMIN — APIXABAN 5 MILLIGRAM(S): 2.5 TABLET, FILM COATED ORAL at 17:15

## 2023-08-29 RX ADMIN — POLYETHYLENE GLYCOL 3350 17 GRAM(S): 17 POWDER, FOR SOLUTION ORAL at 05:19

## 2023-08-29 RX ADMIN — PREGABALIN 1000 MICROGRAM(S): 225 CAPSULE ORAL at 11:32

## 2023-08-29 RX ADMIN — APIXABAN 5 MILLIGRAM(S): 2.5 TABLET, FILM COATED ORAL at 05:19

## 2023-08-29 RX ADMIN — PIPERACILLIN AND TAZOBACTAM 25 GRAM(S): 4; .5 INJECTION, POWDER, LYOPHILIZED, FOR SOLUTION INTRAVENOUS at 21:16

## 2023-08-29 RX ADMIN — LEVETIRACETAM 1500 MILLIGRAM(S): 250 TABLET, FILM COATED ORAL at 17:15

## 2023-08-29 RX ADMIN — Medication 10: at 16:59

## 2023-08-29 RX ADMIN — CHLORHEXIDINE GLUCONATE 1 APPLICATION(S): 213 SOLUTION TOPICAL at 05:20

## 2023-08-29 RX ADMIN — SENNA PLUS 2 TABLET(S): 8.6 TABLET ORAL at 21:16

## 2023-08-29 RX ADMIN — Medication 3 MILLILITER(S): at 11:30

## 2023-08-29 RX ADMIN — POLYETHYLENE GLYCOL 3350 17 GRAM(S): 17 POWDER, FOR SOLUTION ORAL at 17:15

## 2023-08-29 RX ADMIN — Medication 3 MILLILITER(S): at 23:34

## 2023-08-29 RX ADMIN — Medication 3 MILLILITER(S): at 05:33

## 2023-08-29 RX ADMIN — LEVETIRACETAM 1500 MILLIGRAM(S): 250 TABLET, FILM COATED ORAL at 05:19

## 2023-08-29 RX ADMIN — Medication 6: at 08:24

## 2023-08-29 RX ADMIN — Medication 10: at 12:35

## 2023-08-29 RX ADMIN — Medication 1000 MILLIGRAM(S): at 21:16

## 2023-08-29 RX ADMIN — Medication 25 MILLIGRAM(S): at 05:19

## 2023-08-29 RX ADMIN — PIPERACILLIN AND TAZOBACTAM 25 GRAM(S): 4; .5 INJECTION, POWDER, LYOPHILIZED, FOR SOLUTION INTRAVENOUS at 00:50

## 2023-08-29 RX ADMIN — TAMSULOSIN HYDROCHLORIDE 0.4 MILLIGRAM(S): 0.4 CAPSULE ORAL at 21:17

## 2023-08-29 NOTE — PHARMACOTHERAPY INTERVENTION NOTE - INTERVENTION TYPE RECOOMEND
Therapy Recommended - Drug indicated but not ordered
Timing/Frequency of Administration Recommended
Timing/Frequency of Administration Recommended

## 2023-08-29 NOTE — PROGRESS NOTE ADULT - ASSESSMENT
72 year old male PMH of trach  s/p decannulation, s/p PEG removal,  COPD, CHF, DM, seizure disorder, BPH came from [facility] for SOB., admitted with:    #acute on chronic hypoxic/ hypercarbic resp. failure likely copd exacerbation vs  r/o CHF  c/w  iv zosyn for possible aspiration  leucocytosis resolved   blood cx negative till date, Urine legionella Ag negative  c/w steroids qd, duonebs  c/w lasix  pending echo  bipap nocturnal  aspiration precautions  CT chest showed trace and small pleural effusions on right and left respectively  follow up pulmonology    DM2 with hyperglycemia- uncontrolled likely in setting of steroid use- Monitor fsbs/ Lantus/ ISS    ELMER? creat 1.37 avoid nephrotoxic meds  encourage pO hydration, monitor renal function    stage II sacral ulcer POA- wound care    c/w all home meds- Flomax, VPA, Kepra change to PO form, on regular DM diet, Lipitor, Toprol, Eliquis and Primidone.     eliquis for dvt ppx  chloraseptic spray for dry throat    d/w pt and son by bed side.   DC to [facility] once medically optimized

## 2023-08-29 NOTE — DIETITIAN INITIAL EVALUATION ADULT - PERTINENT MEDS FT
MEDICATIONS  (STANDING):  albuterol/ipratropium for Nebulization 3 milliLiter(s) Nebulizer every 6 hours  apixaban 5 milliGRAM(s) Oral every 12 hours  aspirin enteric coated 81 milliGRAM(s) Oral daily  atorvastatin 80 milliGRAM(s) Oral at bedtime  benzocaine/menthol Lozenge 1 Lozenge Oral every 12 hours  budesonide 160 MICROgram(s)/formoterol 4.5 MICROgram(s) Inhaler 2 Puff(s) Inhalation two times a day  chlorhexidine 2% Cloths 1 Application(s) Topical <User Schedule>  cyanocobalamin 1000 MICROGram(s) Oral daily  dextrose 5%. 1000 milliLiter(s) (100 mL/Hr) IV Continuous <Continuous>  dextrose 5%. 1000 milliLiter(s) (50 mL/Hr) IV Continuous <Continuous>  dextrose 50% Injectable 25 Gram(s) IV Push once  dextrose 50% Injectable 12.5 Gram(s) IV Push once  dextrose 50% Injectable 25 Gram(s) IV Push once  furosemide   Injectable 40 milliGRAM(s) IV Push daily  glucagon  Injectable 1 milliGRAM(s) IntraMuscular once  insulin glargine Injectable (LANTUS) 16 Unit(s) SubCutaneous at bedtime  insulin lispro (ADMELOG) corrective regimen sliding scale   SubCutaneous three times a day before meals  levETIRAcetam 1500 milliGRAM(s) Oral two times a day  methylPREDNISolone sodium succinate Injectable 40 milliGRAM(s) IV Push daily  metoprolol tartrate 25 milliGRAM(s) Oral two times a day  piperacillin/tazobactam IVPB.. 3.375 Gram(s) IV Intermittent every 8 hours  polyethylene glycol 3350 17 Gram(s) Oral two times a day  primidone 100 milliGRAM(s) Oral daily  primidone 100 milliGRAM(s) Oral at bedtime  senna 2 Tablet(s) Oral at bedtime  tamsulosin 0.4 milliGRAM(s) Oral at bedtime  valproic acid 750 milliGRAM(s) Oral daily  valproic acid 1000 milliGRAM(s) Oral at bedtime    MEDICATIONS  (PRN):  dextrose Oral Gel 15 Gram(s) Oral once PRN Blood Glucose LESS THAN 70 milliGRAM(s)/deciliter

## 2023-08-29 NOTE — PROGRESS NOTE ADULT - SUBJECTIVE AND OBJECTIVE BOX
pt seen and examined by the bedside, c/o dry throat, denied any other complaints.    Vital Signs Last 24 Hrs  T(C): 36.6 (29 Aug 2023 16:28), Max: 37.1 (28 Aug 2023 21:30)  T(F): 97.8 (29 Aug 2023 16:28), Max: 98.8 (28 Aug 2023 21:30)  HR: 70 (29 Aug 2023 16:33) (54 - 84)  BP: 106/58 (29 Aug 2023 16:28) (106/58 - 144/74)  RR: 18 (29 Aug 2023 16:28) (18 - 18)  SpO2: 96% (29 Aug 2023 16:33) (95% - 100%)  nasal cannula      PHYSICAL EXAM:  GENERAL: NAD,  well-developed, on NC  HEAD:  Atraumatic, Normocephalic  EYES: EOMI, PERRLA, conjunctiva and sclera clear  ENMT: Moist mucous membranes  NECK: Supple  NERVOUS SYSTEM:  Alert & Oriented X3, follows commands, dysarthria chronic per son since cardiac arrest  CHEST/LUNG:  decreased bs b/l at the bases  HEART: Regular rate and rhythm;  ABDOMEN: Soft, Nontender,  Bowel sounds present  EXTREMITIES: no cyanosis, or edema          CBC:            11.8   8.43  )-----------( 170      ( 08-29-23 @ 07:42 )             34.3         Chem:         ( 08-29-23 @ 07:42 )    135  |  94<L>  |  44<H>  ----------------------------<  332<H>  3.8   |  36<H>  |  1.37<H>        Liver Functions: ( 08-29-23 @ 07:42 )  Alb: 2.6 g/dL / Pro: 6.3 gm/dL / ALK PHOS: 78 U/L / ALT: 37 U/L / AST: 18 U/L / GGT: x            CT chest showed trace right and small left pleural effusions

## 2023-08-29 NOTE — PHARMACOTHERAPY INTERVENTION NOTE - COMMENTS
Recommended chlorhexidine 2% cloth since patient is from [facility]. 
Recommended adjusting the time for the VPA morning dose. Dose was scheduled for @1200, instead of @0700 as mentioned in the special instruction. 
Recommended adjusting the time for the primidone morning dose. Dose was scheduled for @1200, instead of @0700 as mentioned in the special instruction.

## 2023-08-29 NOTE — PROGRESS NOTE ADULT - SUBJECTIVE AND OBJECTIVE BOX
CHIEF COMPLAINT/ REASON FOR VISIT  .. Patient was seen to address the  issue listed under PROBLEM LIST which is located toward bottom of this note     BRANDON CAMARILLO    [facility] [address]    Allergies    No Known Allergies    Intolerances        PAST MEDICAL & SURGICAL HISTORY:  HTN (hypertension)      HLD (hyperlipidemia)      Diabetes mellitus      Lacunar infarction      BPH (benign prostatic hyperplasia)      CHF (congestive heart failure)      Chronic obstructive pulmonary disease, unspecified COPD type      BPH without urinary obstruction      History of chronic ischemic heart disease      Cardiac pacemaker      Cardiac arrest      Seizures      TBI (traumatic brain injury)      S/P CABG (coronary artery bypass graft)  2014          FAMILY HISTORY:      Home Medications:  apixaban 5 mg oral tablet: 1 tab(s) orally every 12 hours (27 Aug 2023 13:06)  aspirin 81 mg oral delayed release tablet: 1 tab(s) orally once a day (27 Aug 2023 13:06)  cyanocobalamin 1000 mcg oral tablet: 1 tab(s) orally once a day (27 Aug 2023 13:06)  insulin glargine 100 units/mL subcutaneous solution: 10 unit(s) subcutaneous once a day (at bedtime) (27 Aug 2023 13:06)  levETIRAcetam 100 mg/mL oral solution: 15 milliliter(s) orally 2 times a day (27 Aug 2023 13:06)  Liquifilm Tears preserved ophthalmic solution: 1 drop(s) to each affected eye 2 times a day (27 Aug 2023 13:06)  metoprolol tartrate 25 mg oral tablet: 1 tab(s) orally 2 times a day (27 Aug 2023 13:06)  Multiple Vitamins with Minerals oral liquid: 1  orally once a day (27 Aug 2023 13:06)  primidone 50 mg oral tablet: 1 orally (27 Aug 2023 13:05)  tamsulosin 0.4 mg oral capsule: 1 cap(s) orally once a day (27 Aug 2023 13:06)  valproic acid 250 mg/5 mL oral liquid: 750 mg orally or PEG in AM  1000mg orally or PEG in the evening  (27 Aug 2023 13:06)      MEDICATIONS  (STANDING):  albuterol/ipratropium for Nebulization 3 milliLiter(s) Nebulizer every 6 hours  apixaban 5 milliGRAM(s) Oral every 12 hours  aspirin enteric coated 81 milliGRAM(s) Oral daily  atorvastatin 80 milliGRAM(s) Oral at bedtime  budesonide 160 MICROgram(s)/formoterol 4.5 MICROgram(s) Inhaler 2 Puff(s) Inhalation two times a day  chlorhexidine 2% Cloths 1 Application(s) Topical <User Schedule>  cyanocobalamin 1000 MICROGram(s) Oral daily  dextrose 5%. 1000 milliLiter(s) (100 mL/Hr) IV Continuous <Continuous>  dextrose 5%. 1000 milliLiter(s) (50 mL/Hr) IV Continuous <Continuous>  dextrose 50% Injectable 25 Gram(s) IV Push once  dextrose 50% Injectable 12.5 Gram(s) IV Push once  dextrose 50% Injectable 25 Gram(s) IV Push once  furosemide   Injectable 40 milliGRAM(s) IV Push daily  glucagon  Injectable 1 milliGRAM(s) IntraMuscular once  insulin glargine Injectable (LANTUS) 16 Unit(s) SubCutaneous at bedtime  insulin lispro (ADMELOG) corrective regimen sliding scale   SubCutaneous three times a day before meals  levETIRAcetam 1500 milliGRAM(s) Oral two times a day  methylPREDNISolone sodium succinate Injectable 40 milliGRAM(s) IV Push daily  metoprolol tartrate 25 milliGRAM(s) Oral two times a day  piperacillin/tazobactam IVPB.- 3.375 Gram(s) IV Intermittent once  piperacillin/tazobactam IVPB.. 3.375 Gram(s) IV Intermittent every 8 hours  polyethylene glycol 3350 17 Gram(s) Oral two times a day  primidone 100 milliGRAM(s) Oral daily  primidone 100 milliGRAM(s) Oral at bedtime  senna 2 Tablet(s) Oral at bedtime  tamsulosin 0.4 milliGRAM(s) Oral at bedtime  valproic acid 750 milliGRAM(s) Oral daily  valproic acid 1000 milliGRAM(s) Oral at bedtime    MEDICATIONS  (PRN):  dextrose Oral Gel 15 Gram(s) Oral once PRN Blood Glucose LESS THAN 70 milliGRAM(s)/deciliter      Diet, Consistent Carbohydrate/No Snacks (08-27-23 @ 19:11) [Active]          Vital Signs Last 24 Hrs  T(C): 36.7 (29 Aug 2023 05:03), Max: 37.1 (28 Aug 2023 21:30)  T(F): 98 (29 Aug 2023 05:03), Max: 98.8 (28 Aug 2023 21:30)  HR: 62 (29 Aug 2023 05:03) (60 - 84)  BP: 124/57 (29 Aug 2023 05:03) (98/56 - 144/74)  BP(mean): 82 (28 Aug 2023 21:30) (74 - 89)  RR: 18 (29 Aug 2023 05:03) (18 - 18)  SpO2: 100% (29 Aug 2023 05:03) (95% - 100%)    Parameters below as of 28 Aug 2023 23:55  Patient On (Oxygen Delivery Method): nasal cannula          08-27-23 @ 07:01  -  08-28-23 @ 07:00  --------------------------------------------------------  IN: 550 mL / OUT: 900 mL / NET: -350 mL    08-28-23 @ 07:01  -  08-29-23 @ 06:45  --------------------------------------------------------  IN: 1500 mL / OUT: 650 mL / NET: 850 mL              LABS:                        14.1   16.46 )-----------( 171      ( 27 Aug 2023 13:40 )             43.2     08-27    134<L>  |  98  |  26<H>  ----------------------------<  203<H>  4.8   |  32<H>  |  0.95    Ca    8.2<L>      27 Aug 2023 13:40  Mg     2.1     08-27      PT/INR - ( 27 Aug 2023 14:32 )   PT: 11.3 sec;   INR: 0.94 ratio         PTT - ( 27 Aug 2023 14:32 )  PTT:24.6 sec  Urinalysis Basic - ( 27 Aug 2023 13:40 )    Color: x / Appearance: x / SG: x / pH: x  Gluc: 203 mg/dL / Ketone: x  / Bili: x / Urobili: x   Blood: x / Protein: x / Nitrite: x   Leuk Esterase: x / RBC: x / WBC x   Sq Epi: x / Non Sq Epi: x / Bacteria: x        ABG - ( 29 Aug 2023 06:36 )  pH, Arterial: 7.43  pH, Blood: x     /  pCO2: 53    /  pO2: 116   / HCO3: 35    / Base Excess: 9.1   /  SaO2: 98.3                WBC:  WBC Count: 16.46 K/uL (08-27 @ 13:40)      MICROBIOLOGY:  RECENT CULTURES:              PT/INR - ( 27 Aug 2023 14:32 )   PT: 11.3 sec;   INR: 0.94 ratio         PTT - ( 27 Aug 2023 14:32 )  PTT:24.6 sec    Sodium:  Sodium: 134 mmol/L (08-27 @ 13:40)      0.95 mg/dL 08-27 @ 13:40      Hemoglobin:  Hemoglobin: 14.1 g/dL (08-27 @ 13:40)      Platelets: Platelet Count - Automated: 171 K/uL (08-27 @ 13:40)          Urinalysis Basic - ( 27 Aug 2023 13:40 )    Color: x / Appearance: x / SG: x / pH: x  Gluc: 203 mg/dL / Ketone: x  / Bili: x / Urobili: x   Blood: x / Protein: x / Nitrite: x   Leuk Esterase: x / RBC: x / WBC x   Sq Epi: x / Non Sq Epi: x / Bacteria: x        RADIOLOGY & ADDITIONAL STUDIES:      MICROBIOLOGY:  RECENT CULTURES:

## 2023-08-29 NOTE — DIETITIAN INITIAL EVALUATION ADULT - REASON INDICATOR FOR ASSESSMENT
RN consult for MST score 2 or > & pressure ulcer stage 2 or greater  Normal vision: sees adequately in most situations; can see medication labels, newsprint

## 2023-08-29 NOTE — DIETITIAN INITIAL EVALUATION ADULT - NS FNS WEIGHT CHANGE REASON
Per previous adm RD note 11/14/22, pt c wt. of 82.2 kg/180 LBS  As per current adm wt. of 73.9 kg/162.9 LBS, pt c wt. loss of 8.3 kg/18 LBS./unintentional

## 2023-08-29 NOTE — DIETITIAN INITIAL EVALUATION ADULT - ORAL INTAKE PTA/DIET HISTORY
Pt appeared to be a poor historian, pt's son came into see pt as this writer was in the room.  Pt/son is know to this writer from previous prolonged adm in 11/2022.  Pt was transferred from [facility], was on consistent carbohydrate, DASH/ TLC, regular consistency c thin fluids, + Glucerna Shake 1 x daily as per transfer records.  Per son, pt does not eat beef or pork, but will eat chicken and fish(previous request for lacto-ovo vegetarian diet noted). Pt appeared to be a poor historian, pt's son came into see pt as this writer was in the room.  Pt/son is know to this writer from previous prolonged adm in 11/2022.  Pt was transferred from [facility], was on consistent carbohydrate, DASH/ TLC, regular consistency c thin fluids, + Glucerna Shake 1 x daily as per transfer records.  Per son, pt does not eat beef or pork, but will eat chicken and fish(Food & Nutrition service made aware), per previous adm note, request for lacto-ovo vegetarian  diet was noted.

## 2023-08-29 NOTE — PROGRESS NOTE ADULT - ASSESSMENT
REASON FOR VISIT  .. Management of problems listed below      PHYSICAL EXAM    HEENT Unremarkable  atraumatic   RESP Fair air entry  Harsh breath sound   CARDIAC S1 S2 No S3     NO JVD    ABDOMEN No hepatosplenomegaly   PEDAL EDEMA present No calf tenderness  NO rash       GENERAL DATA .     GOC.    .. 8/28/2023 full code   ICU STAY.   .. none  COVID.   ..    BEST PRACTICE ISSUES.    HOB ELEVATN.   .. Yes  DVT PPLX.   ..  8/27 apixaba 5.2     SPEECH SWALLOW RECOMMENDATIONS.    ..        DIET.    ..  8/28/2023 cons carb   IV fl.  ..   STRESS ULCER PPLX.   ..      INFECTION PPLX.   ..   8/28 chlorhexidine 2%   ALLGY.  ..   nka                      WT.  ..  8/28/2023 71  BMI.  ..    8/28/2023 27  PROCEDURES.  ..     ABGS.   . 8/29/2023 2l 743/53/116  . 8/27/2023 2p 18/8/30 732/60/75   . 8/27/20231 1p100% nrb 732/112/111     VS/ PO/IO/ VENT/ DRIPS.   8/29/2023 afeb 68 100/50   8/29/2023 ra 98%     DOA C/C.        . 8/27/2023 From [facility] 72 m lauren d loc 2-3 min while being cleaned then became responsive  called code blue in [facility]         INITIAL PRESENTATION.   . 8/27/2023 72 year old male PMH of trach now removed, PEG now removed, COPD, CHF, DM, PPM, seizure disorder, BPH to ER from [facility] today for SOB. Denies fevers, CP, cough, abdominal pain or leg edema. Son at bedside, seen in ER on BIPAP, Responded well to Steroids, Lasix and Duonebs in ER.   . 8/28/2023 Pulm consultd   PMH.   . CABG 2014,   . 3 stents few years after CABG,   . s/p trach/PEG on 12/5 now decannulated both  . cardiac arrest due to low blood sugar in the past.  . DVT 12/12/2022 l Subclav throm    HOME MEDS.            MAIN ISSUES .   . Hypercapnic resp filure 8/27   . Recommend noct bpap as he has hypercapnia and may use bpap during day   .... 1) if short of breath or  .... 2)  increased wob or  .... 3) increasing lethargy sec to hypercapnia     . l Pl effsn 8/27 cxr   .. cr ch 8/29/2023 cw 1/13/2023   .... tr r and sm l effs  .... mucous or secretions distal trachea   .... stable 5 mm rml nodule   .... l lo lobe bectasis and cystic changes   .... lingular and l lo lobe atelectasis   .. Will hold off thorac at this point     . Pneumonia   .. Has leukocytosis Poss aspn pneum   .. On zosyn  .. speech eval     . COPD 8/28 duoneb symbicort  .. 8/28/2023 Change to solumed 40 and taper off over 5d     . A fib 8/27 apixaba 5.2  rate control and ac     . Syncope 8/27 Suggest ct h neuro eval     POST DISCHARGE ISSUES.    TIME SPENT.   . Over 36 minutes aggregate care time spent on encounter; activities included   direct patient care, counseling and/or coordinating care reviewing notes, lab data/ imaging , discussion with multidisciplinary team/ patient  /family and explaining in detail risks, benefits, alternatives  of the recommendations     MARQUISE TAYLOR 72 m 8/28/2023 1948 DR OMID SARABIA

## 2023-08-30 LAB
ALBUMIN SERPL ELPH-MCNC: 2.4 G/DL — LOW (ref 3.3–5)
ALP SERPL-CCNC: 72 U/L — SIGNIFICANT CHANGE UP (ref 40–120)
ALT FLD-CCNC: 31 U/L — SIGNIFICANT CHANGE UP (ref 12–78)
ANION GAP SERPL CALC-SCNC: 6 MMOL/L — SIGNIFICANT CHANGE UP (ref 5–17)
AST SERPL-CCNC: 12 U/L — LOW (ref 15–37)
BILIRUB SERPL-MCNC: 0.3 MG/DL — SIGNIFICANT CHANGE UP (ref 0.2–1.2)
BUN SERPL-MCNC: 43 MG/DL — HIGH (ref 7–23)
CALCIUM SERPL-MCNC: 8.3 MG/DL — LOW (ref 8.5–10.1)
CHLORIDE SERPL-SCNC: 100 MMOL/L — SIGNIFICANT CHANGE UP (ref 96–108)
CO2 SERPL-SCNC: 35 MMOL/L — HIGH (ref 22–31)
CREAT SERPL-MCNC: 1.2 MG/DL — SIGNIFICANT CHANGE UP (ref 0.5–1.3)
EGFR: 63 ML/MIN/1.73M2 — SIGNIFICANT CHANGE UP
GLUCOSE BLDC GLUCOMTR-MCNC: 195 MG/DL — HIGH (ref 70–99)
GLUCOSE BLDC GLUCOMTR-MCNC: 246 MG/DL — HIGH (ref 70–99)
GLUCOSE BLDC GLUCOMTR-MCNC: 281 MG/DL — HIGH (ref 70–99)
GLUCOSE BLDC GLUCOMTR-MCNC: 310 MG/DL — HIGH (ref 70–99)
GLUCOSE BLDC GLUCOMTR-MCNC: 359 MG/DL — HIGH (ref 70–99)
GLUCOSE SERPL-MCNC: 265 MG/DL — HIGH (ref 70–99)
HCT VFR BLD CALC: 34.1 % — LOW (ref 39–50)
HGB BLD-MCNC: 11.6 G/DL — LOW (ref 13–17)
MCHC RBC-ENTMCNC: 30.9 PG — SIGNIFICANT CHANGE UP (ref 27–34)
MCHC RBC-ENTMCNC: 34 G/DL — SIGNIFICANT CHANGE UP (ref 32–36)
MCV RBC AUTO: 90.9 FL — SIGNIFICANT CHANGE UP (ref 80–100)
NRBC # BLD: 0 /100 WBCS — SIGNIFICANT CHANGE UP (ref 0–0)
PLATELET # BLD AUTO: 164 K/UL — SIGNIFICANT CHANGE UP (ref 150–400)
POTASSIUM SERPL-MCNC: 3.9 MMOL/L — SIGNIFICANT CHANGE UP (ref 3.5–5.3)
POTASSIUM SERPL-SCNC: 3.9 MMOL/L — SIGNIFICANT CHANGE UP (ref 3.5–5.3)
PROT SERPL-MCNC: 5.9 GM/DL — LOW (ref 6–8.3)
RBC # BLD: 3.75 M/UL — LOW (ref 4.2–5.8)
RBC # FLD: 16 % — HIGH (ref 10.3–14.5)
SODIUM SERPL-SCNC: 141 MMOL/L — SIGNIFICANT CHANGE UP (ref 135–145)
WBC # BLD: 8.11 K/UL — SIGNIFICANT CHANGE UP (ref 3.8–10.5)
WBC # FLD AUTO: 8.11 K/UL — SIGNIFICANT CHANGE UP (ref 3.8–10.5)

## 2023-08-30 PROCEDURE — 70450 CT HEAD/BRAIN W/O DYE: CPT | Mod: 26

## 2023-08-30 PROCEDURE — 99232 SBSQ HOSP IP/OBS MODERATE 35: CPT

## 2023-08-30 PROCEDURE — 99233 SBSQ HOSP IP/OBS HIGH 50: CPT

## 2023-08-30 RX ORDER — INSULIN GLARGINE 100 [IU]/ML
20 INJECTION, SOLUTION SUBCUTANEOUS AT BEDTIME
Refills: 0 | Status: DISCONTINUED | OUTPATIENT
Start: 2023-08-30 | End: 2023-09-02

## 2023-08-30 RX ORDER — INSULIN LISPRO 100/ML
4 VIAL (ML) SUBCUTANEOUS
Refills: 0 | Status: DISCONTINUED | OUTPATIENT
Start: 2023-08-30 | End: 2023-09-02

## 2023-08-30 RX ADMIN — LEVETIRACETAM 1500 MILLIGRAM(S): 250 TABLET, FILM COATED ORAL at 05:34

## 2023-08-30 RX ADMIN — INSULIN GLARGINE 20 UNIT(S): 100 INJECTION, SOLUTION SUBCUTANEOUS at 21:55

## 2023-08-30 RX ADMIN — APIXABAN 5 MILLIGRAM(S): 2.5 TABLET, FILM COATED ORAL at 05:35

## 2023-08-30 RX ADMIN — LEVETIRACETAM 1500 MILLIGRAM(S): 250 TABLET, FILM COATED ORAL at 17:56

## 2023-08-30 RX ADMIN — BENZOCAINE AND MENTHOL 1 LOZENGE: 5; 1 LIQUID ORAL at 18:08

## 2023-08-30 RX ADMIN — APIXABAN 5 MILLIGRAM(S): 2.5 TABLET, FILM COATED ORAL at 17:58

## 2023-08-30 RX ADMIN — Medication 750 MILLIGRAM(S): at 08:01

## 2023-08-30 RX ADMIN — Medication 4 UNIT(S): at 17:11

## 2023-08-30 RX ADMIN — PIPERACILLIN AND TAZOBACTAM 25 GRAM(S): 4; .5 INJECTION, POWDER, LYOPHILIZED, FOR SOLUTION INTRAVENOUS at 13:57

## 2023-08-30 RX ADMIN — POLYETHYLENE GLYCOL 3350 17 GRAM(S): 17 POWDER, FOR SOLUTION ORAL at 17:55

## 2023-08-30 RX ADMIN — BUDESONIDE AND FORMOTEROL FUMARATE DIHYDRATE 2 PUFF(S): 160; 4.5 AEROSOL RESPIRATORY (INHALATION) at 05:39

## 2023-08-30 RX ADMIN — TAMSULOSIN HYDROCHLORIDE 0.4 MILLIGRAM(S): 0.4 CAPSULE ORAL at 21:56

## 2023-08-30 RX ADMIN — ATORVASTATIN CALCIUM 80 MILLIGRAM(S): 80 TABLET, FILM COATED ORAL at 21:54

## 2023-08-30 RX ADMIN — BUDESONIDE AND FORMOTEROL FUMARATE DIHYDRATE 2 PUFF(S): 160; 4.5 AEROSOL RESPIRATORY (INHALATION) at 17:59

## 2023-08-30 RX ADMIN — Medication 3 MILLILITER(S): at 05:17

## 2023-08-30 RX ADMIN — PIPERACILLIN AND TAZOBACTAM 25 GRAM(S): 4; .5 INJECTION, POWDER, LYOPHILIZED, FOR SOLUTION INTRAVENOUS at 21:54

## 2023-08-30 RX ADMIN — BENZOCAINE AND MENTHOL 1 LOZENGE: 5; 1 LIQUID ORAL at 05:35

## 2023-08-30 RX ADMIN — Medication 25 MILLIGRAM(S): at 05:38

## 2023-08-30 RX ADMIN — Medication 25 MILLIGRAM(S): at 17:58

## 2023-08-30 RX ADMIN — Medication 6: at 17:09

## 2023-08-30 RX ADMIN — POLYETHYLENE GLYCOL 3350 17 GRAM(S): 17 POWDER, FOR SOLUTION ORAL at 05:35

## 2023-08-30 RX ADMIN — Medication 81 MILLIGRAM(S): at 12:26

## 2023-08-30 RX ADMIN — PRIMIDONE 100 MILLIGRAM(S): 250 TABLET ORAL at 05:34

## 2023-08-30 RX ADMIN — Medication 3 MILLILITER(S): at 11:37

## 2023-08-30 RX ADMIN — PIPERACILLIN AND TAZOBACTAM 25 GRAM(S): 4; .5 INJECTION, POWDER, LYOPHILIZED, FOR SOLUTION INTRAVENOUS at 05:34

## 2023-08-30 RX ADMIN — Medication 8: at 12:13

## 2023-08-30 RX ADMIN — CHLORHEXIDINE GLUCONATE 1 APPLICATION(S): 213 SOLUTION TOPICAL at 06:17

## 2023-08-30 RX ADMIN — PRIMIDONE 100 MILLIGRAM(S): 250 TABLET ORAL at 21:55

## 2023-08-30 RX ADMIN — Medication 40 MILLIGRAM(S): at 08:01

## 2023-08-30 RX ADMIN — PREGABALIN 1000 MICROGRAM(S): 225 CAPSULE ORAL at 12:26

## 2023-08-30 RX ADMIN — Medication 40 MILLIGRAM(S): at 05:38

## 2023-08-30 RX ADMIN — Medication 4: at 08:59

## 2023-08-30 RX ADMIN — SENNA PLUS 2 TABLET(S): 8.6 TABLET ORAL at 21:56

## 2023-08-30 RX ADMIN — Medication 4 UNIT(S): at 12:14

## 2023-08-30 RX ADMIN — Medication 1000 MILLIGRAM(S): at 21:56

## 2023-08-30 RX ADMIN — Medication 3 MILLILITER(S): at 17:01

## 2023-08-30 RX ADMIN — Medication 3 MILLILITER(S): at 23:24

## 2023-08-30 NOTE — PROGRESS NOTE ADULT - SUBJECTIVE AND OBJECTIVE BOX
CHIEF COMPLAINT/ REASON FOR VISIT  .. Patient was seen to address the  issue listed under PROBLEM LIST which is located toward bottom of this note     BRANDON CAMARILLO    LVS [address]    Allergies    No Known Allergies    Intolerances        PAST MEDICAL & SURGICAL HISTORY:  HTN (hypertension)      HLD (hyperlipidemia)      Diabetes mellitus      Lacunar infarction      BPH (benign prostatic hyperplasia)      CHF (congestive heart failure)      Chronic obstructive pulmonary disease, unspecified COPD type      BPH without urinary obstruction      History of chronic ischemic heart disease      Cardiac pacemaker      Cardiac arrest      Seizures      TBI (traumatic brain injury)      S/P CABG (coronary artery bypass graft)  2014          FAMILY HISTORY:      Home Medications:  apixaban 5 mg oral tablet: 1 tab(s) orally every 12 hours (27 Aug 2023 13:06)  aspirin 81 mg oral delayed release tablet: 1 tab(s) orally once a day (27 Aug 2023 13:06)  cyanocobalamin 1000 mcg oral tablet: 1 tab(s) orally once a day (27 Aug 2023 13:06)  insulin glargine 100 units/mL subcutaneous solution: 10 unit(s) subcutaneous once a day (at bedtime) (27 Aug 2023 13:06)  levETIRAcetam 100 mg/mL oral solution: 15 milliliter(s) orally 2 times a day (27 Aug 2023 13:06)  Liquifilm Tears preserved ophthalmic solution: 1 drop(s) to each affected eye 2 times a day (27 Aug 2023 13:06)  metoprolol tartrate 25 mg oral tablet: 1 tab(s) orally 2 times a day (27 Aug 2023 13:06)  Multiple Vitamins with Minerals oral liquid: 1  orally once a day (27 Aug 2023 13:06)  primidone 50 mg oral tablet: 1 orally (27 Aug 2023 13:05)  tamsulosin 0.4 mg oral capsule: 1 cap(s) orally once a day (27 Aug 2023 13:06)  valproic acid 250 mg/5 mL oral liquid: 750 mg orally or PEG in AM  1000mg orally or PEG in the evening  (27 Aug 2023 13:06)      MEDICATIONS  (STANDING):  albuterol/ipratropium for Nebulization 3 milliLiter(s) Nebulizer every 6 hours  apixaban 5 milliGRAM(s) Oral every 12 hours  aspirin enteric coated 81 milliGRAM(s) Oral daily  atorvastatin 80 milliGRAM(s) Oral at bedtime  benzocaine/menthol Lozenge 1 Lozenge Oral every 12 hours  budesonide 160 MICROgram(s)/formoterol 4.5 MICROgram(s) Inhaler 2 Puff(s) Inhalation two times a day  chlorhexidine 2% Cloths 1 Application(s) Topical <User Schedule>  cyanocobalamin 1000 MICROGram(s) Oral daily  dextrose 5%. 1000 milliLiter(s) (50 mL/Hr) IV Continuous <Continuous>  dextrose 5%. 1000 milliLiter(s) (100 mL/Hr) IV Continuous <Continuous>  dextrose 50% Injectable 25 Gram(s) IV Push once  dextrose 50% Injectable 12.5 Gram(s) IV Push once  dextrose 50% Injectable 25 Gram(s) IV Push once  furosemide   Injectable 40 milliGRAM(s) IV Push daily  glucagon  Injectable 1 milliGRAM(s) IntraMuscular once  insulin glargine Injectable (LANTUS) 16 Unit(s) SubCutaneous at bedtime  insulin lispro (ADMELOG) corrective regimen sliding scale   SubCutaneous three times a day before meals  levETIRAcetam 1500 milliGRAM(s) Oral two times a day  methylPREDNISolone sodium succinate Injectable 40 milliGRAM(s) IV Push daily  metoprolol tartrate 25 milliGRAM(s) Oral two times a day  piperacillin/tazobactam IVPB.. 3.375 Gram(s) IV Intermittent every 8 hours  polyethylene glycol 3350 17 Gram(s) Oral two times a day  primidone 100 milliGRAM(s) Oral daily  primidone 100 milliGRAM(s) Oral at bedtime  senna 2 Tablet(s) Oral at bedtime  tamsulosin 0.4 milliGRAM(s) Oral at bedtime  valproic acid 750 milliGRAM(s) Oral daily  valproic acid 1000 milliGRAM(s) Oral at bedtime    MEDICATIONS  (PRN):  dextrose Oral Gel 15 Gram(s) Oral once PRN Blood Glucose LESS THAN 70 milliGRAM(s)/deciliter      Diet, Consistent Carbohydrate w/Evening Snack:   1500mL Fluid Restriction ([id number])  Low Sodium  Supplement Feeding Modality:  Oral  Glucerna Shake Cans or Servings Per Day:  1       Frequency:  Daily (08-29-23 @ 13:54) [Active]          Vital Signs Last 24 Hrs  T(C): 36.9 (30 Aug 2023 04:54), Max: 37.1 (29 Aug 2023 23:03)  T(F): 98.4 (30 Aug 2023 04:54), Max: 98.7 (29 Aug 2023 23:03)  HR: 67 (30 Aug 2023 08:25) (54 - 82)  BP: 146/81 (30 Aug 2023 07:51) (106/58 - 146/81)  BP(mean): --  RR: 18 (30 Aug 2023 07:51) (18 - 18)  SpO2: 97% (30 Aug 2023 08:25) (96% - 100%)    Parameters below as of 30 Aug 2023 07:51  Patient On (Oxygen Delivery Method): room air          08-29-23 @ 07:01  -  08-30-23 @ 07:00  --------------------------------------------------------  IN: 1200 mL / OUT: 400 mL / NET: 800 mL              LABS:                        11.6   8.11  )-----------( 164      ( 30 Aug 2023 07:40 )             34.1     08-29    135  |  94<L>  |  44<H>  ----------------------------<  332<H>  3.8   |  36<H>  |  1.37<H>    Ca    8.1<L>      29 Aug 2023 07:42  Phos  3.6     08-29    TPro  6.3  /  Alb  2.6<L>  /  TBili  0.3  /  DBili  x   /  AST  18  /  ALT  37  /  AlkPhos  78  08-29    PT/INR - ( 29 Aug 2023 07:42 )   PT: 11.3 sec;   INR: 0.94 ratio           Urinalysis Basic - ( 29 Aug 2023 07:42 )    Color: x / Appearance: x / SG: x / pH: x  Gluc: 332 mg/dL / Ketone: x  / Bili: x / Urobili: x   Blood: x / Protein: x / Nitrite: x   Leuk Esterase: x / RBC: x / WBC x   Sq Epi: x / Non Sq Epi: x / Bacteria: x        ABG - ( 29 Aug 2023 06:36 )  pH, Arterial: 7.43  pH, Blood: x     /  pCO2: 53    /  pO2: 116   / HCO3: 35    / Base Excess: 9.1   /  SaO2: 98.3                WBC:  WBC Count: 8.11 K/uL (08-30 @ 07:40)  WBC Count: 8.43 K/uL (08-29 @ 07:42)  WBC Count: 16.46 K/uL (08-27 @ 13:40)      MICROBIOLOGY:  RECENT CULTURES:  08-28 .Blood Blood XXXX XXXX   No growth at 24 hours    08-28 .Sputum Sputum XXXX   Few polymorphonuclear leukocytes per low power field  Moderate Squamous epithelial cells per low power field  Numerous Gram positive cocci in pairs per oil power field  Moderate Gram Positive Rods per oil power field  Moderate Gram Negative Rods per oil power field   Normal Respiratory Larissa present                PT/INR - ( 29 Aug 2023 07:42 )   PT: 11.3 sec;   INR: 0.94 ratio             Sodium:  Sodium: 135 mmol/L (08-29 @ 07:42)  Sodium: 134 mmol/L (08-27 @ 13:40)      1.37 mg/dL 08-29 @ 07:42  0.95 mg/dL 08-27 @ 13:40      Hemoglobin:  Hemoglobin: 11.6 g/dL (08-30 @ 07:40)  Hemoglobin: 11.8 g/dL (08-29 @ 07:42)  Hemoglobin: 14.1 g/dL (08-27 @ 13:40)      Platelets: Platelet Count - Automated: 164 K/uL (08-30 @ 07:40)  Platelet Count - Automated: 170 K/uL (08-29 @ 07:42)  Platelet Count - Automated: 171 K/uL (08-27 @ 13:40)      LIVER FUNCTIONS - ( 29 Aug 2023 07:42 )  Alb: 2.6 g/dL / Pro: 6.3 gm/dL / ALK PHOS: 78 U/L / ALT: 37 U/L / AST: 18 U/L / GGT: x             Urinalysis Basic - ( 29 Aug 2023 07:42 )    Color: x / Appearance: x / SG: x / pH: x  Gluc: 332 mg/dL / Ketone: x  / Bili: x / Urobili: x   Blood: x / Protein: x / Nitrite: x   Leuk Esterase: x / RBC: x / WBC x   Sq Epi: x / Non Sq Epi: x / Bacteria: x        RADIOLOGY & ADDITIONAL STUDIES:      MICROBIOLOGY:  RECENT CULTURES:  08-28 .Blood Blood XXXX XXXX   No growth at 24 hours    08-28 .Sputum Sputum XXXX   Few polymorphonuclear leukocytes per low power field  Moderate Squamous epithelial cells per low power field  Numerous Gram positive cocci in pairs per oil power field  Moderate Gram Positive Rods per oil power field  Moderate Gram Negative Rods per oil power field   Normal Respiratory Larissa present

## 2023-08-30 NOTE — PROGRESS NOTE ADULT - SUBJECTIVE AND OBJECTIVE BOX
pt seen and examined by the bedside,  denied any other complaints.      Vital Signs Last 24 Hrs  T(C): 36.4 (30 Aug 2023 10:34), Max: 37.1 (29 Aug 2023 23:03)  T(F): 97.6 (30 Aug 2023 10:34), Max: 98.7 (29 Aug 2023 23:03)  HR: 69 (30 Aug 2023 11:50) (62 - 82)  BP: 129/72 (30 Aug 2023 10:34) (106/58 - 146/81)  BP(mean): --  RR: 18 (30 Aug 2023 10:34) (18 - 18)  SpO2: 96% (30 Aug 2023 11:50) (96% - 97%)   BiPAP/CPAP        PHYSICAL EXAM:  GENERAL: NAD,  well-developed, on NC  HEAD:  Atraumatic, Normocephalic  EYES: EOMI, PERRLA, conjunctiva and sclera clear  ENMT: Moist mucous membranes  NECK: Supple  NERVOUS SYSTEM:  Alert & Oriented X3, follows commands, dysarthria chronic per son since cardiac arrest  CHEST/LUNG:  decreased bs b/l at the bases  HEART: Regular rate and rhythm;  ABDOMEN: Soft, Nontender,  Bowel sounds present  EXTREMITIES: no cyanosis, or edema    Labs:    CBC:            11.6   8.11  )-----------( 164      ( 08-30-23 @ 07:40 )             34.1         Chem:         ( 08-30-23 @ 07:40 )    141  |  100  |  43<H>  ----------------------------<  265<H>  3.9   |  35<H>  |  1.20        Liver Functions: ( 08-30-23 @ 07:40 )  Alb: 2.4 g/dL / Pro: 5.9 gm/dL / ALK PHOS: 72 U/L / ALT: 31 U/L / AST: 12 U/L / GGT: x

## 2023-08-30 NOTE — PROGRESS NOTE ADULT - ASSESSMENT
72 year old male PMH of trach  s/p decannulation, s/p PEG removal,  COPD, CHF, DM, seizure disorder, BPH came from [facility] for SOB., admitted with:    #acute on chronic hypoxic/ hypercarbic resp. failure likely copd exacerbation vs  r/o CHF  c/w  iv zosyn for possible aspiration  leucocytosis resolved   blood cx negative till date, Urine legionella Ag negative  c/w steroids qd (day 3), duonebs  c/w lasix  awaiting echo results  bipap nocturnal  aspiration precautions  CT chest showed trace and small pleural effusions on right and left respectively-- no plan fro thoracentesis for now  follow up pulmonology    DM2 with hyperglycemia- uncontrolled likely in setting of steroid use- Monitor fsbs/increased Lantus/ ISS/ added bolus insulin    ELMER creat 1.37--> improving   avoid nephrotoxic meds  encourage pO hydration, monitor renal function    stage II sacral ulcer POA- wound care    c/w all home meds- Flomax, VPA, Keppra change to PO form, on regular DM diet, Lipitor, Toprol, Eliquis and Primidone.     eliquis for dvt ppx  chloraseptic spray for dry throat  requested speech and swallow eval today  CT head performed-- unremarkable-- pt had questionable syncope on admission    d/w pt and son by bed side.   DC to [facility] once medically optimized

## 2023-08-31 ENCOUNTER — NON-APPOINTMENT (OUTPATIENT)
Age: 73
End: 2023-08-31

## 2023-08-31 LAB
CULTURE RESULTS: SIGNIFICANT CHANGE UP
GLUCOSE BLDC GLUCOMTR-MCNC: 180 MG/DL — HIGH (ref 70–99)
GLUCOSE BLDC GLUCOMTR-MCNC: 225 MG/DL — HIGH (ref 70–99)
GLUCOSE BLDC GLUCOMTR-MCNC: 246 MG/DL — HIGH (ref 70–99)
GLUCOSE BLDC GLUCOMTR-MCNC: 311 MG/DL — HIGH (ref 70–99)
GRAM STN FLD: SIGNIFICANT CHANGE UP
LEVETIRACETAM SERPL-MCNC: 127.6 UG/ML — HIGH (ref 10–40)
S PNEUM AG UR QL: NEGATIVE — SIGNIFICANT CHANGE UP
SPECIMEN SOURCE: SIGNIFICANT CHANGE UP

## 2023-08-31 PROCEDURE — 99232 SBSQ HOSP IP/OBS MODERATE 35: CPT

## 2023-08-31 RX ORDER — LEVETIRACETAM 250 MG/1
1000 TABLET, FILM COATED ORAL
Refills: 0 | Status: DISCONTINUED | OUTPATIENT
Start: 2023-08-31 | End: 2023-09-02

## 2023-08-31 RX ADMIN — PIPERACILLIN AND TAZOBACTAM 25 GRAM(S): 4; .5 INJECTION, POWDER, LYOPHILIZED, FOR SOLUTION INTRAVENOUS at 05:21

## 2023-08-31 RX ADMIN — Medication 25 MILLIGRAM(S): at 17:47

## 2023-08-31 RX ADMIN — Medication 4: at 11:22

## 2023-08-31 RX ADMIN — Medication 4 UNIT(S): at 17:17

## 2023-08-31 RX ADMIN — Medication 2: at 08:26

## 2023-08-31 RX ADMIN — PRIMIDONE 100 MILLIGRAM(S): 250 TABLET ORAL at 21:50

## 2023-08-31 RX ADMIN — Medication 1000 MILLIGRAM(S): at 21:49

## 2023-08-31 RX ADMIN — Medication 4 UNIT(S): at 11:23

## 2023-08-31 RX ADMIN — TAMSULOSIN HYDROCHLORIDE 0.4 MILLIGRAM(S): 0.4 CAPSULE ORAL at 21:50

## 2023-08-31 RX ADMIN — PREGABALIN 1000 MICROGRAM(S): 225 CAPSULE ORAL at 11:22

## 2023-08-31 RX ADMIN — Medication 81 MILLIGRAM(S): at 11:59

## 2023-08-31 RX ADMIN — CHLORHEXIDINE GLUCONATE 1 APPLICATION(S): 213 SOLUTION TOPICAL at 05:16

## 2023-08-31 RX ADMIN — ATORVASTATIN CALCIUM 80 MILLIGRAM(S): 80 TABLET, FILM COATED ORAL at 21:49

## 2023-08-31 RX ADMIN — Medication 40 MILLIGRAM(S): at 05:26

## 2023-08-31 RX ADMIN — Medication 8: at 17:16

## 2023-08-31 RX ADMIN — POLYETHYLENE GLYCOL 3350 17 GRAM(S): 17 POWDER, FOR SOLUTION ORAL at 05:20

## 2023-08-31 RX ADMIN — Medication 750 MILLIGRAM(S): at 05:22

## 2023-08-31 RX ADMIN — SENNA PLUS 2 TABLET(S): 8.6 TABLET ORAL at 21:50

## 2023-08-31 RX ADMIN — INSULIN GLARGINE 20 UNIT(S): 100 INJECTION, SOLUTION SUBCUTANEOUS at 21:49

## 2023-08-31 RX ADMIN — PIPERACILLIN AND TAZOBACTAM 25 GRAM(S): 4; .5 INJECTION, POWDER, LYOPHILIZED, FOR SOLUTION INTRAVENOUS at 13:41

## 2023-08-31 RX ADMIN — BUDESONIDE AND FORMOTEROL FUMARATE DIHYDRATE 2 PUFF(S): 160; 4.5 AEROSOL RESPIRATORY (INHALATION) at 05:31

## 2023-08-31 RX ADMIN — Medication 3 MILLILITER(S): at 23:11

## 2023-08-31 RX ADMIN — APIXABAN 5 MILLIGRAM(S): 2.5 TABLET, FILM COATED ORAL at 17:57

## 2023-08-31 RX ADMIN — LEVETIRACETAM 1000 MILLIGRAM(S): 250 TABLET, FILM COATED ORAL at 17:42

## 2023-08-31 RX ADMIN — Medication 3 MILLILITER(S): at 05:26

## 2023-08-31 RX ADMIN — PIPERACILLIN AND TAZOBACTAM 25 GRAM(S): 4; .5 INJECTION, POWDER, LYOPHILIZED, FOR SOLUTION INTRAVENOUS at 21:48

## 2023-08-31 RX ADMIN — BUDESONIDE AND FORMOTEROL FUMARATE DIHYDRATE 2 PUFF(S): 160; 4.5 AEROSOL RESPIRATORY (INHALATION) at 17:50

## 2023-08-31 RX ADMIN — Medication 3 MILLILITER(S): at 11:17

## 2023-08-31 RX ADMIN — LEVETIRACETAM 1500 MILLIGRAM(S): 250 TABLET, FILM COATED ORAL at 05:19

## 2023-08-31 RX ADMIN — Medication 25 MILLIGRAM(S): at 05:20

## 2023-08-31 RX ADMIN — POLYETHYLENE GLYCOL 3350 17 GRAM(S): 17 POWDER, FOR SOLUTION ORAL at 17:47

## 2023-08-31 RX ADMIN — BENZOCAINE AND MENTHOL 1 LOZENGE: 5; 1 LIQUID ORAL at 17:53

## 2023-08-31 RX ADMIN — PRIMIDONE 100 MILLIGRAM(S): 250 TABLET ORAL at 05:22

## 2023-08-31 RX ADMIN — APIXABAN 5 MILLIGRAM(S): 2.5 TABLET, FILM COATED ORAL at 05:17

## 2023-08-31 RX ADMIN — Medication 40 MILLIGRAM(S): at 05:19

## 2023-08-31 RX ADMIN — Medication 3 MILLILITER(S): at 17:04

## 2023-08-31 RX ADMIN — Medication 4 UNIT(S): at 08:27

## 2023-08-31 NOTE — PHYSICAL THERAPY INITIAL EVALUATION ADULT - GAIT TRAINING, PT EVAL
Pt will be able to ambulate for short distance if possible with assistance and/or  use of mechanical device.

## 2023-08-31 NOTE — PHYSICAL THERAPY INITIAL EVALUATION ADULT - NSPTDISCHREC_GEN_A_CORE
due to strength deficits in the UE and LE, difficulty in bed mobility, transfers and ambulation/Sub-acute Rehab

## 2023-08-31 NOTE — SWALLOW BEDSIDE ASSESSMENT ADULT - ADDITIONAL RECOMMENDATIONS
consider repeat MBS as needed once at Rehab center to consider repeat FEED ONLY AWARE AND ACCEPTING PO INTAKE ;  With improved respiratory status, treating SLP at [facility] may consider repeat MBS at that time

## 2023-08-31 NOTE — SWALLOW BEDSIDE ASSESSMENT ADULT - SWALLOW EVAL: RECOMMENDED FEEDING/EATING TECHNIQUES
allow for swallow between intakes/alternate food with liquid allow for swallow between intakes/alternate food with liquid/check mouth frequently for oral residue/pocketing/maintain upright posture during/after eating for 30 mins/no straws/oral hygiene/position upright (90 degrees)/small sips/bites

## 2023-08-31 NOTE — SWALLOW BEDSIDE ASSESSMENT ADULT - ASR SWALLOW RECOMMEND DIAG
Treating SLP at [facility] may consider repeat MBS s/p decannulation/VFSS/MBS VFSS/MBS

## 2023-08-31 NOTE — SWALLOW BEDSIDE ASSESSMENT ADULT - SWALLOW EVAL: DIAGNOSIS
Oropharyngeal dysphagia with hx trach and medical problems now with respiratory deficits; there are no airway protection deficits on this exam; however coordination of breathing and swallowing is reduced with likely poor endurance during meal intake; Dysphonia with poor respiratory support to sustain phonation for speech Oropharyngeal dysphagia with cognitive deficits , hx trach and multiple medical problems now with COPD vs CHF ;  there are no airway protection deficits on this exam; However coordination of breathing and swallowing is reduced with likely poor endurance with fatigue during meal intake; Dysphonia sec respiratory deficits with poor ability to sustain phonation for speech purposes Oropharyngeal dysphagia with cognitive deficits , hx trach and multiple medical problems now with COPD vs CHF ;  there are no airway protection deficits on this exam; However coordination of breathing and swallowing may be impacted with poor endurance and fatigue throughout meal intake; Dysphonia sec respiratory deficits with poor ability to sustain phonation for speech purposes

## 2023-08-31 NOTE — SWALLOW BEDSIDE ASSESSMENT ADULT - SWALLOW EVAL: RECOMMENDED DIET
change to MINCED/MOIST texture due to poor endurance for eating/swallowing;  THIN liquids ; NO STRAWS ; PILLS WITH APPLESAUCE change to MINCED/MOIST texture due to poor endurance for eating/swallowing;  THIN liquids ; NO STRAWS ; SINGLE SIP CUP DRINKING; PILLS WITH APPLESAUCE

## 2023-08-31 NOTE — SWALLOW BEDSIDE ASSESSMENT ADULT - SWALLOW EVAL: PATIENT/FAMILY GOALS STATEMENT
unable to state; poorly sustained phonation for speech and jerky movements evident from head/neck and upper body unable to state; jerky movements evident from head/neck and upper body

## 2023-08-31 NOTE — PROGRESS NOTE ADULT - SUBJECTIVE AND OBJECTIVE BOX
pt seen and examined by the bedside,  denied any other complaints.      Vital Signs Last 24 Hrs  T(C): 36.6 (31 Aug 2023 12:09), Max: 36.9 (30 Aug 2023 23:48)  T(F): 97.8 (31 Aug 2023 12:09), Max: 98.5 (30 Aug 2023 23:48)  HR: 68 (31 Aug 2023 12:09) (57 - 79)  BP: 126/76 (31 Aug 2023 12:09) (124/69 - 133/77)  RR: 18 (31 Aug 2023 12:09) (18 - 20)  SpO2: 96% (31 Aug 2023 12:09) (94% - 99%)   room air      PHYSICAL EXAM:  GENERAL: NAD,  well-developed  HEAD:  Atraumatic, Normocephalic  EYES: EOMI, PERRLA, conjunctiva and sclera clear  ENMT: Moist mucous membranes  NECK: Supple  NERVOUS SYSTEM:  Alert & Oriented X3, follows commands, dysarthria chronic per son since cardiac arrest  CHEST/LUNG:  clear to ausculation b/l  HEART: Regular rate and rhythm;  ABDOMEN: Soft, Nontender,  Bowel sounds present  EXTREMITIES: no cyanosis, or edema    Labs:    CBC:            11.6   8.11  )-----------( 164      ( 08-30-23 @ 07:40 )             34.1         Chem:         ( 08-30-23 @ 07:40 )    141  |  100  |  43<H>  ----------------------------<  265<H>  3.9   |  35<H>  |  1.20        Liver Functions: ( 08-30-23 @ 07:40 )  Alb: 2.4 g/dL / Pro: 5.9 gm/dL / ALK PHOS: 72 U/L / ALT: 31 U/L / AST: 12 U/L / GGT: x

## 2023-08-31 NOTE — PHYSICAL THERAPY INITIAL EVALUATION ADULT - PRECAUTIONS/LIMITATIONS, REHAB EVAL
presents with generalized weakness, strength deficits in the UE and LE, difficulty in bed mobility, transfers and ambulation , fall risk.

## 2023-08-31 NOTE — PHYSICAL THERAPY INITIAL EVALUATION ADULT - PATIENT/FAMILY/SIGNIFICANT OTHER GOALS STATEMENT, PT EVAL
Pt's son named Philomena wants to see his father get stronger and be able to walk and participate in functional tasks.

## 2023-08-31 NOTE — PROGRESS NOTE ADULT - ASSESSMENT
72 year old male PMH of trach  s/p decannulation, s/p PEG removal,  COPD, CHF, DM, seizure disorder, BPH came from [facility] for SOB., admitted with:    #acute on chronic hypoxic/ hypercarbic resp. failure likely copd exacerbation vs  r/o CHF  c/w  iv zosyn (DAY 5) for possible aspiration  leucocytosis resolved   blood cx negative till date, Urine legionella Ag negative  c/w steroids qd (day 4), duonebs  c/w lasix  ECHO resuts reviewed  bipap nocturnal  aspiration precautions  CT chest showed trace and small pleural effusions on right and left respectively-- no plan fro thoracentesis for now  follow up pulmonology    DM2 with hyperglycemia- - better controlled, Monitor fsbs/c/w Lantus/ ISS/ c/w bolus insulin    ELMER creat  improving 1.20   avoid nephrotoxic meds  encourage pO hydration, monitor renal function    stage II sacral ulcer POA- wound care    c/w all home meds- Flomax, VPA, Keppra change to PO form, on regular DM diet, Lipitor, Toprol, Eliquis and Primidone.   Given elevated Keppra level, decreased keppra from 1500 to 1000 bid, spoke with Neurologist DR Loo today, advised to repeat Keppra level in week    eliquis for dvt ppx  awaiting speech and swallow eval today  awaiting PT eval for placement back to [facility]      d/w pt and son by bed side.   Likely DC to [facility] tomorrow

## 2023-08-31 NOTE — PHYSICAL THERAPY INITIAL EVALUATION ADULT - ADDITIONAL COMMENTS
As per son Philomena stated when pt was in [facility] he was able to ambulate with rolling walker with max assist of 2-3 persons and/or use mechanical devices.

## 2023-08-31 NOTE — SWALLOW BEDSIDE ASSESSMENT ADULT - SLP PERTINENT HISTORY OF CURRENT PROBLEM
ER from [place] today for SOB.OB from COPD exacerbation and mild CHF. ER from [place] today for SOB from COPD exacerbation and mild CHF

## 2023-08-31 NOTE — PHYSICAL THERAPY INITIAL EVALUATION ADULT - FUNCTIONAL LIMITATIONS, PT EVAL
[facility]  Physical Therapy Initial Evaluation    Date: 10/20/2020   Name: Janay Archer  Clinic Number: [id number]    [employer] as a [employer]   self-care/home management/community/leisure

## 2023-08-31 NOTE — PHYSICAL THERAPY INITIAL EVALUATION ADULT - DIAGNOSIS, PT EVAL
Pt presented with strength deficits in the UE and LE, poor general endurance, poor sitting balance, difficulty in bed mobility, transfers and ambulation due to strength deficits, tremors in the UE and LE with activities.

## 2023-08-31 NOTE — PROGRESS NOTE ADULT - SUBJECTIVE AND OBJECTIVE BOX
CHIEF COMPLAINT/ REASON FOR VISIT  .. Patient was seen to address the  issue listed under PROBLEM LIST which is located toward bottom of this note     BRANDON CAMARILLO    [address] [address]    Allergies    No Known Allergies    Intolerances        PAST MEDICAL & SURGICAL HISTORY:  HTN (hypertension)      HLD (hyperlipidemia)      Diabetes mellitus      Lacunar infarction      BPH (benign prostatic hyperplasia)      CHF (congestive heart failure)      Chronic obstructive pulmonary disease, unspecified COPD type      BPH without urinary obstruction      History of chronic ischemic heart disease      Cardiac pacemaker      Cardiac arrest      Seizures      TBI (traumatic brain injury)      S/P CABG (coronary artery bypass graft)  2014          FAMILY HISTORY:      Home Medications:  apixaban 5 mg oral tablet: 1 tab(s) orally every 12 hours (27 Aug 2023 13:06)  aspirin 81 mg oral delayed release tablet: 1 tab(s) orally once a day (27 Aug 2023 13:06)  cyanocobalamin 1000 mcg oral tablet: 1 tab(s) orally once a day (27 Aug 2023 13:06)  insulin glargine 100 units/mL subcutaneous solution: 10 unit(s) subcutaneous once a day (at bedtime) (27 Aug 2023 13:06)  levETIRAcetam 100 mg/mL oral solution: 15 milliliter(s) orally 2 times a day (27 Aug 2023 13:06)  Liquifilm Tears preserved ophthalmic solution: 1 drop(s) to each affected eye 2 times a day (27 Aug 2023 13:06)  metoprolol tartrate 25 mg oral tablet: 1 tab(s) orally 2 times a day (27 Aug 2023 13:06)  Multiple Vitamins with Minerals oral liquid: 1  orally once a day (27 Aug 2023 13:06)  primidone 50 mg oral tablet: 1 orally (27 Aug 2023 13:05)  tamsulosin 0.4 mg oral capsule: 1 cap(s) orally once a day (27 Aug 2023 13:06)  valproic acid 250 mg/5 mL oral liquid: 750 mg orally or PEG in AM  1000mg orally or PEG in the evening  (27 Aug 2023 13:06)      MEDICATIONS  (STANDING):  albuterol/ipratropium for Nebulization 3 milliLiter(s) Nebulizer every 6 hours  apixaban 5 milliGRAM(s) Oral every 12 hours  aspirin enteric coated 81 milliGRAM(s) Oral daily  atorvastatin 80 milliGRAM(s) Oral at bedtime  benzocaine/menthol Lozenge 1 Lozenge Oral every 12 hours  budesonide 160 MICROgram(s)/formoterol 4.5 MICROgram(s) Inhaler 2 Puff(s) Inhalation two times a day  chlorhexidine 2% Cloths 1 Application(s) Topical <User Schedule>  cyanocobalamin 1000 MICROGram(s) Oral daily  dextrose 5%. 1000 milliLiter(s) (50 mL/Hr) IV Continuous <Continuous>  dextrose 5%. 1000 milliLiter(s) (100 mL/Hr) IV Continuous <Continuous>  dextrose 50% Injectable 25 Gram(s) IV Push once  dextrose 50% Injectable 25 Gram(s) IV Push once  dextrose 50% Injectable 12.5 Gram(s) IV Push once  furosemide   Injectable 40 milliGRAM(s) IV Push daily  glucagon  Injectable 1 milliGRAM(s) IntraMuscular once  insulin glargine Injectable (LANTUS) 20 Unit(s) SubCutaneous at bedtime  insulin lispro (ADMELOG) corrective regimen sliding scale   SubCutaneous three times a day before meals  insulin lispro Injectable (ADMELOG) 4 Unit(s) SubCutaneous three times a day before meals  levETIRAcetam 1500 milliGRAM(s) Oral two times a day  methylPREDNISolone sodium succinate Injectable 40 milliGRAM(s) IV Push daily  metoprolol tartrate 25 milliGRAM(s) Oral two times a day  piperacillin/tazobactam IVPB.. 3.375 Gram(s) IV Intermittent every 8 hours  polyethylene glycol 3350 17 Gram(s) Oral two times a day  primidone 100 milliGRAM(s) Oral daily  primidone 100 milliGRAM(s) Oral at bedtime  senna 2 Tablet(s) Oral at bedtime  tamsulosin 0.4 milliGRAM(s) Oral at bedtime  valproic acid 750 milliGRAM(s) Oral daily  valproic acid 1000 milliGRAM(s) Oral at bedtime    MEDICATIONS  (PRN):  dextrose Oral Gel 15 Gram(s) Oral once PRN Blood Glucose LESS THAN 70 milliGRAM(s)/deciliter      Diet, Consistent Carbohydrate w/Evening Snack:   1500mL Fluid Restriction ([id number])  Low Sodium  Supplement Feeding Modality:  Oral  Glucerna Shake Cans or Servings Per Day:  1       Frequency:  Daily (08-29-23 @ 13:54) [Active]          Vital Signs Last 24 Hrs  T(C): 36.4 (31 Aug 2023 04:56), Max: 36.9 (30 Aug 2023 23:48)  T(F): 97.6 (31 Aug 2023 04:56), Max: 98.5 (30 Aug 2023 23:48)  HR: 77 (31 Aug 2023 05:50) (57 - 79)  BP: 133/77 (31 Aug 2023 04:56) (124/69 - 146/81)  BP(mean): --  RR: 18 (31 Aug 2023 04:56) (18 - 18)  SpO2: 96% (31 Aug 2023 05:50) (94% - 99%)    Parameters below as of 31 Aug 2023 05:45  Patient On (Oxygen Delivery Method): BiPAP/CPAP          08-30-23 @ 07:01  -  08-31-23 @ 07:00  --------------------------------------------------------  IN: 590 mL / OUT: 0 mL / NET: 590 mL              LABS:                        11.6   8.11  )-----------( 164      ( 30 Aug 2023 07:40 )             34.1     08-30    141  |  100  |  43<H>  ----------------------------<  265<H>  3.9   |  35<H>  |  1.20    Ca    8.3<L>      30 Aug 2023 07:40  Phos  3.6     08-29    TPro  5.9<L>  /  Alb  2.4<L>  /  TBili  0.3  /  DBili  x   /  AST  12<L>  /  ALT  31  /  AlkPhos  72  08-30    PT/INR - ( 29 Aug 2023 07:42 )   PT: 11.3 sec;   INR: 0.94 ratio           Urinalysis Basic - ( 30 Aug 2023 07:40 )    Color: x / Appearance: x / SG: x / pH: x  Gluc: 265 mg/dL / Ketone: x  / Bili: x / Urobili: x   Blood: x / Protein: x / Nitrite: x   Leuk Esterase: x / RBC: x / WBC x   Sq Epi: x / Non Sq Epi: x / Bacteria: x            WBC:  WBC Count: 8.11 K/uL (08-30 @ 07:40)  WBC Count: 8.43 K/uL (08-29 @ 07:42)  WBC Count: 16.46 K/uL (08-27 @ 13:40)      MICROBIOLOGY:  RECENT CULTURES:  08-28 .Blood Blood XXXX XXXX   No growth at 48 Hours    08-28 .Sputum Sputum XXXX   Few polymorphonuclear leukocytes per low power field  Moderate Squamous epithelial cells per low power field  Numerous Gram positive cocci in pairs per oil power field  Moderate Gram Positive Rods per oil power field  Moderate Gram Negative Rods per oil power field   Normal Respiratory Larissa present                PT/INR - ( 29 Aug 2023 07:42 )   PT: 11.3 sec;   INR: 0.94 ratio             Sodium:  Sodium: 141 mmol/L (08-30 @ 07:40)  Sodium: 135 mmol/L (08-29 @ 07:42)  Sodium: 134 mmol/L (08-27 @ 13:40)      1.20 mg/dL 08-30 @ 07:40  1.37 mg/dL 08-29 @ 07:42  0.95 mg/dL 08-27 @ 13:40      Hemoglobin:  Hemoglobin: 11.6 g/dL (08-30 @ 07:40)  Hemoglobin: 11.8 g/dL (08-29 @ 07:42)  Hemoglobin: 14.1 g/dL (08-27 @ 13:40)      Platelets: Platelet Count - Automated: 164 K/uL (08-30 @ 07:40)  Platelet Count - Automated: 170 K/uL (08-29 @ 07:42)  Platelet Count - Automated: 171 K/uL (08-27 @ 13:40)      LIVER FUNCTIONS - ( 30 Aug 2023 07:40 )  Alb: 2.4 g/dL / Pro: 5.9 gm/dL / ALK PHOS: 72 U/L / ALT: 31 U/L / AST: 12 U/L / GGT: x             Urinalysis Basic - ( 30 Aug 2023 07:40 )    Color: x / Appearance: x / SG: x / pH: x  Gluc: 265 mg/dL / Ketone: x  / Bili: x / Urobili: x   Blood: x / Protein: x / Nitrite: x   Leuk Esterase: x / RBC: x / WBC x   Sq Epi: x / Non Sq Epi: x / Bacteria: x        RADIOLOGY & ADDITIONAL STUDIES:      MICROBIOLOGY:  RECENT CULTURES:  08-28 .Blood Blood XXXX XXXX   No growth at 48 Hours    08-28 .Sputum Sputum XXXX   Few polymorphonuclear leukocytes per low power field  Moderate Squamous epithelial cells per low power field  Numerous Gram positive cocci in pairs per oil power field  Moderate Gram Positive Rods per oil power field  Moderate Gram Negative Rods per oil power field   Normal Respiratory Larissa present

## 2023-08-31 NOTE — PHYSICAL THERAPY INITIAL EVALUATION ADULT - BALANCE TRAINING, PT EVAL
Pt will improve sitting, standing and ambulation balance to fair with assistance and use of mechanical device.

## 2023-08-31 NOTE — SWALLOW BEDSIDE ASSESSMENT ADULT - COMMENTS
PMH of trach now removed, PEG now removed, COPD, CHF, DM, PPM, seizure disorder, BPH to  8/27 CXR New small to moderate-sized left pleural effusion. Trace right pleural effusion. Mild increased interstitial lung markings.  8/29 CT cervical spine Head CT: No displaced calvarial fracture or acute intracranial hemorrhage. Stable chronic infarcts.  Cervical spine CT: No acute fracture. Cervical spondylosis.  8/30 Ct head No acute intracranial hemorrhage, brain edema, or mass effect.Extensive white matter microvascular ischemic disease. No displaced calvarial fracture.  8/31 MD note acute on chronic hypoxic/ hypercarbic resp. failure likely copd exacerbation vs  r/o CHF  c/w  iv zosyn (DAY 5) for possible aspirationwaiting speech and swallow eval today; likely d/c Orzac tomorrow PMH of trach now removed, PEG now removed, COPD, CHF, DM, PPM, seizure disorder, BPH  February 16, 2023 MBS completed with trach and PMSV in place at that time   8/27 CXR New small to moderate-sized left pleural effusion. Trace right pleural effusion. Mild increased interstitial lung markings.  8/29 Head CT: No displaced calvarial fracture or acute intracranial hemorrhage. Stable chronic infarcts.  Cervical spine CT: No acute fracture. Cervical spondylosis.  8/30 Ct head No acute intracranial hemorrhage, brain edema, or mass effect. Extensive white matter microvascular ischemic disease. No displaced calvarial fracture.  8/31 MD note acute on chronic hypoxic/ hypercarbic resp. failure likely copd exacerbation vs  r/o CHF ;  possible aspiration; waiting speech and swallow eval today; likely d/c Orzac tomorrow intermittent jerky movements evident at baseline ; pt denied pharyngeal stasis stating "went down"; cleared oral residue with thin liquids PMH of trach now removed, PEG now removed, COPD, CHF, DM, PPM, seizure disorder, BPH  February 16, 2023 MBS was completed with trach and PMSV in place at that time   8/27 CXR New small to moderate-sized left pleural effusion. Trace right pleural effusion. Mild increased interstitial lung markings.  8/29 Head CT: No displaced calvarial fracture or acute intracranial hemorrhage. Stable chronic infarcts.  Cervical spine CT: No acute fracture. Cervical spondylosis.  8/30 Ct head No acute intracranial hemorrhage, brain edema, or mass effect. Extensive white matter microvascular ischemic disease. No displaced calvarial fracture.  8/31 MD note acute on chronic hypoxic/ hypercarbic resp. failure likely copd exacerbation vs  r/o CHF ;  possible aspiration; waiting speech and swallow eval today; likely d/c Orzac tomorrow intermittent jerky movements evident at baseline similar tolerance to thin liquids pt denied pharyngeal stasis stating "went down"; cleared oral residue with thin liquids; increased work of eating with harder solids

## 2023-08-31 NOTE — PHYSICAL THERAPY INITIAL EVALUATION ADULT - PERTINENT HX OF CURRENT PROBLEM, REHAB EVAL
[facility], admitted to [facility] facility with SOB, Pmhx, COPD, CHF, DM, PPM , Seizure disorder, BPH, denies fever and  coughing.

## 2023-08-31 NOTE — PROGRESS NOTE ADULT - ASSESSMENT
REASON FOR VISIT  .. Management of problems listed below      PHYSICAL EXAM    HEENT Unremarkable  atraumatic   RESP Fair air entry  Harsh breath sound   CARDIAC S1 S2 No S3     NO JVD    ABDOMEN No hepatosplenomegaly   PEDAL EDEMA present No calf tenderness  NO rash       GENERAL DATA .     GOC.    .. 8/28/2023 full code   ICU STAY.   .. none  COVID.   ..    BEST PRACTICE ISSUES.    HOB ELEVATN.   .. Yes  DVT PPLX.   ..  8/27 apixaba 5.2   ( af)   SPEECH SWALLOW RECOMMENDATIONS.    ..        DIET.    ..  8/28/2023 cons carb   IV fl.  ..   STRESS ULCER PPLX.   ..      INFECTION PPLX.   ..   8/28 chlorhexidine 2%   ALLGY.  ..   nka                      WT.  ..  8/28/2023 71  BMI.  ..    8/28/2023 27  PROCEDURES.  ..     ABGS.   . 8/29/2023 2l 743/53/116  . 8/27/2023 2p 18/8/30 732/60/75   . 8/27/20231 1p100% nrb 732/112/111     VS/ PO/IO/ VENT/ DRIPS.  8/31/2023 afeb 70 120/70   8/31/2023 ra 96%      DOA C/C.        . 8/27/2023 From [facility] 72 m lauren d loc 2-3 min while being cleaned then became responsive  called code blue in [facility]         INITIAL PRESENTATION.   . 8/27/2023 72 year old male PMH of trach now removed, PEG now removed, COPD, CHF, DM, PPM, seizure disorder, BPH to ER from [facility] today for SOB. Denies fevers, CP, cough, abdominal pain or leg edema. Son at bedside, seen in ER on BIPAP, Responded well to Steroids, Lasix and Duonebs in ER.   . 8/28/2023 Pulm consultd   PMH.   . CABG 2014,   . 3 stents few years after CABG,   . s/p trach/PEG on 12/5 now decannulated both  . cardiac arrest due to low blood sugar in the past.  . DVT 12/12/2022 l Subclav throm      PROBLEMS/ASSESSMENT/RECOMMENDATIONS (A/R).  PULMONARY.  . GAS EXCHANGE.  .. A/R.   .. Monitor pulse oximetry and target po 90-95%  .. Recommend noct bpap as he has hypercapnia and may use bpap during day   .... 1) if short of breath or  .... 2)  increased wob or  .... 3) increasing lethargy sec to hypercapnia     . COPD   .. 8/28/2023 duoneb  .. 8/28/2023 symbicort    .. 8/27 solumed 20.3   .. 8/28/2023 Change to solumed 40 and taper off over 5d     INFECTION.  . 1) Aspn pneum 8/27  .. w 8/27-8/29-8/30 w 16 - 8.4 - 8.1  .. pr 8/29/2023 pr .4   . cxr 8/27/2023   .... l cardiac device and sternotomy wires   .... New small to mod l pl effs   .... Tr r pl effs   .... mildly increased is lung markings   .. cr ch 8/29/2023 cw 1/13/2023   .... tr r and sm l effs  .... mucous or secretions distal trachea   .... stable 5 mm rml nodule   .... l lo lobe bectasis and cystic changes   .... lingular and l lo lobe atelectasis   .. 8/28 mrsa (-)  .. bc 8/28 (-)   .. 8/28 legn (-)   .. A/R  .. Has leukocytosis Poss aspn pneum Suggest zosyn  Follow blod c sp cs   .. speech eval   .. 8/28/2023 dw Dr Sarabia agrees with zosyn  .. 8/31/2023 recommend 7 d abio May change to augmentin by 9/2 if dc plannd       . A fib  .. 8/27 apixaba 5.2   .. 8/27 metoprolol 25.2  .. 8/28/2023 cont rate control and avnb     . CAD   .. Tr 8/28 tr 73   .. 8/27 ASA 81   .. 8/28/2023 atorvastat 80  .. cont rx     . CHF   .. bnp 8/27/2023 bnp 3124   . 11/14/2022 echo mod decr segmental lvsf apical lateral apiccal ant segment are abn takotsubo cmpthy pasp 42 .  .. 8/28/2023 lasix 40   .. Check echo     HEMAT   .. Hb 8/27-8/29/2023 Hb 14 - 11.8   .. inr 8/27 .94   .. Monitor     RENAL   .. Na 8/27 Na 134   .. cr 8/29/2023 cr 1.3   .. Monitor     . Syncope   .. 8/30/2023 ct h (-)     . Seizures   .. 8/27 keppra 1500.2     MAIN ISSUES.  . Hypercapnic resp filure 8/27   . Recommend noct bpap  . l Pl effsn 8/27 cxr   .. Ct ch showed v small effs   . Pneumonia   ..  Started on zosyn  .. speech eval   . COPD   On BD steroids   . A fib 8/27 apixaba 5.2  rate control and ac   . Syncope 8/27 8/30 ct h (-)    TIME SPENT.   . Over 36 minutes aggregate care time spent on encounter; activities included   direct patient care, counseling and/or coordinating care reviewing notes, lab data/ imaging , discussion with multidisciplinary team/ patient  /family and explaining in detail risks, benefits, alternatives  of the recommendations     MARQUISE TAYLOR 72 m 8/28/2023 1948 DR OMID SARABIA

## 2023-08-31 NOTE — PHYSICAL THERAPY INITIAL EVALUATION ADULT - STRENGTHENING, PT EVAL
Pt will improve strength in the UE and LE to 4/5 or 4+/5 if possible and improve overall performance doing ADLs.

## 2023-08-31 NOTE — SWALLOW BEDSIDE ASSESSMENT ADULT - SLP GENERAL OBSERVATIONS
alert but minimally verbal due to inability to sustain phonation ; stated name; followed few directions but did willingly accept po trials

## 2023-09-01 ENCOUNTER — TRANSCRIPTION ENCOUNTER (OUTPATIENT)
Age: 73
End: 2023-09-01

## 2023-09-01 LAB
ANION GAP SERPL CALC-SCNC: 7 MMOL/L — SIGNIFICANT CHANGE UP (ref 5–17)
BUN SERPL-MCNC: 41 MG/DL — HIGH (ref 7–23)
CALCIUM SERPL-MCNC: 8.5 MG/DL — SIGNIFICANT CHANGE UP (ref 8.5–10.1)
CHLORIDE SERPL-SCNC: 97 MMOL/L — SIGNIFICANT CHANGE UP (ref 96–108)
CO2 SERPL-SCNC: 36 MMOL/L — HIGH (ref 22–31)
CREAT SERPL-MCNC: 1.11 MG/DL — SIGNIFICANT CHANGE UP (ref 0.5–1.3)
EGFR: 69 ML/MIN/1.73M2 — SIGNIFICANT CHANGE UP
GLUCOSE BLDC GLUCOMTR-MCNC: 120 MG/DL — HIGH (ref 70–99)
GLUCOSE BLDC GLUCOMTR-MCNC: 159 MG/DL — HIGH (ref 70–99)
GLUCOSE BLDC GLUCOMTR-MCNC: 215 MG/DL — HIGH (ref 70–99)
GLUCOSE BLDC GLUCOMTR-MCNC: 275 MG/DL — HIGH (ref 70–99)
GLUCOSE SERPL-MCNC: 159 MG/DL — HIGH (ref 70–99)
POTASSIUM SERPL-MCNC: 3.5 MMOL/L — SIGNIFICANT CHANGE UP (ref 3.5–5.3)
POTASSIUM SERPL-SCNC: 3.5 MMOL/L — SIGNIFICANT CHANGE UP (ref 3.5–5.3)
SODIUM SERPL-SCNC: 140 MMOL/L — SIGNIFICANT CHANGE UP (ref 135–145)

## 2023-09-01 PROCEDURE — 99232 SBSQ HOSP IP/OBS MODERATE 35: CPT

## 2023-09-01 PROCEDURE — 99239 HOSP IP/OBS DSCHRG MGMT >30: CPT

## 2023-09-01 RX ORDER — TAMSULOSIN HYDROCHLORIDE 0.4 MG/1
1 CAPSULE ORAL
Qty: 0 | Refills: 0 | DISCHARGE
Start: 2023-09-01

## 2023-09-01 RX ORDER — TIOTROPIUM BROMIDE 18 UG/1
2 CAPSULE ORAL; RESPIRATORY (INHALATION)
Qty: 0 | Refills: 0 | DISCHARGE
Start: 2023-09-01

## 2023-09-01 RX ORDER — ASPIRIN/CALCIUM CARB/MAGNESIUM 324 MG
1 TABLET ORAL
Qty: 0 | Refills: 0 | DISCHARGE

## 2023-09-01 RX ORDER — TAMSULOSIN HYDROCHLORIDE 0.4 MG/1
1 CAPSULE ORAL
Qty: 0 | Refills: 0 | DISCHARGE

## 2023-09-01 RX ORDER — IPRATROPIUM/ALBUTEROL SULFATE 18-103MCG
3 AEROSOL WITH ADAPTER (GRAM) INHALATION
Qty: 0 | Refills: 0 | DISCHARGE
Start: 2023-09-01

## 2023-09-01 RX ORDER — POLYETHYLENE GLYCOL 3350 17 G/17G
17 POWDER, FOR SOLUTION ORAL
Qty: 0 | Refills: 0 | DISCHARGE
Start: 2023-09-01

## 2023-09-01 RX ORDER — METOPROLOL TARTRATE 50 MG
1 TABLET ORAL
Qty: 0 | Refills: 0 | DISCHARGE
Start: 2023-09-01

## 2023-09-01 RX ORDER — ATORVASTATIN CALCIUM 80 MG/1
1 TABLET, FILM COATED ORAL
Qty: 0 | Refills: 0 | DISCHARGE
Start: 2023-09-01

## 2023-09-01 RX ORDER — ASPIRIN/CALCIUM CARB/MAGNESIUM 324 MG
1 TABLET ORAL
Qty: 0 | Refills: 0 | DISCHARGE
Start: 2023-09-01

## 2023-09-01 RX ORDER — PRIMIDONE 250 MG/1
1 TABLET ORAL
Refills: 0 | DISCHARGE

## 2023-09-01 RX ORDER — PRIMIDONE 250 MG/1
2 TABLET ORAL
Qty: 0 | Refills: 0 | DISCHARGE
Start: 2023-09-01

## 2023-09-01 RX ORDER — VALPROIC ACID (AS SODIUM SALT) 250 MG/5ML
3 SOLUTION, ORAL ORAL
Qty: 0 | Refills: 0 | DISCHARGE
Start: 2023-09-01

## 2023-09-01 RX ORDER — IPRATROPIUM/ALBUTEROL SULFATE 18-103MCG
3 AEROSOL WITH ADAPTER (GRAM) INHALATION EVERY 6 HOURS
Refills: 0 | Status: DISCONTINUED | OUTPATIENT
Start: 2023-09-01 | End: 2023-09-02

## 2023-09-01 RX ORDER — INSULIN GLARGINE 100 [IU]/ML
10 INJECTION, SOLUTION SUBCUTANEOUS
Qty: 0 | Refills: 0 | DISCHARGE
Start: 2023-09-01

## 2023-09-01 RX ORDER — LEVETIRACETAM 250 MG/1
1 TABLET, FILM COATED ORAL
Qty: 0 | Refills: 0 | DISCHARGE
Start: 2023-09-01

## 2023-09-01 RX ORDER — TIOTROPIUM BROMIDE 18 UG/1
2 CAPSULE ORAL; RESPIRATORY (INHALATION) DAILY
Refills: 0 | Status: DISCONTINUED | OUTPATIENT
Start: 2023-09-01 | End: 2023-09-02

## 2023-09-01 RX ORDER — APIXABAN 2.5 MG/1
1 TABLET, FILM COATED ORAL
Qty: 0 | Refills: 0 | DISCHARGE
Start: 2023-09-01

## 2023-09-01 RX ORDER — VALPROIC ACID (AS SODIUM SALT) 250 MG/5ML
4 SOLUTION, ORAL ORAL
Qty: 0 | Refills: 0 | DISCHARGE
Start: 2023-09-01

## 2023-09-01 RX ORDER — INSULIN LISPRO 100/ML
1 VIAL (ML) SUBCUTANEOUS
Qty: 0 | Refills: 0 | DISCHARGE
Start: 2023-09-01

## 2023-09-01 RX ORDER — SENNA PLUS 8.6 MG/1
2 TABLET ORAL
Qty: 0 | Refills: 0 | DISCHARGE
Start: 2023-09-01

## 2023-09-01 RX ADMIN — PIPERACILLIN AND TAZOBACTAM 25 GRAM(S): 4; .5 INJECTION, POWDER, LYOPHILIZED, FOR SOLUTION INTRAVENOUS at 21:45

## 2023-09-01 RX ADMIN — Medication 2: at 07:53

## 2023-09-01 RX ADMIN — TIOTROPIUM BROMIDE 2 PUFF(S): 18 CAPSULE ORAL; RESPIRATORY (INHALATION) at 11:36

## 2023-09-01 RX ADMIN — BUDESONIDE AND FORMOTEROL FUMARATE DIHYDRATE 2 PUFF(S): 160; 4.5 AEROSOL RESPIRATORY (INHALATION) at 17:22

## 2023-09-01 RX ADMIN — Medication 81 MILLIGRAM(S): at 11:30

## 2023-09-01 RX ADMIN — Medication 3 MILLILITER(S): at 05:26

## 2023-09-01 RX ADMIN — LEVETIRACETAM 1000 MILLIGRAM(S): 250 TABLET, FILM COATED ORAL at 17:21

## 2023-09-01 RX ADMIN — Medication 1000 MILLIGRAM(S): at 21:44

## 2023-09-01 RX ADMIN — PREGABALIN 1000 MICROGRAM(S): 225 CAPSULE ORAL at 11:30

## 2023-09-01 RX ADMIN — Medication 4 UNIT(S): at 11:32

## 2023-09-01 RX ADMIN — INSULIN GLARGINE 20 UNIT(S): 100 INJECTION, SOLUTION SUBCUTANEOUS at 21:45

## 2023-09-01 RX ADMIN — Medication 25 MILLIGRAM(S): at 05:33

## 2023-09-01 RX ADMIN — BENZOCAINE AND MENTHOL 1 LOZENGE: 5; 1 LIQUID ORAL at 05:32

## 2023-09-01 RX ADMIN — PRIMIDONE 100 MILLIGRAM(S): 250 TABLET ORAL at 21:45

## 2023-09-01 RX ADMIN — Medication 750 MILLIGRAM(S): at 06:17

## 2023-09-01 RX ADMIN — POLYETHYLENE GLYCOL 3350 17 GRAM(S): 17 POWDER, FOR SOLUTION ORAL at 17:22

## 2023-09-01 RX ADMIN — Medication 40 MILLIGRAM(S): at 05:32

## 2023-09-01 RX ADMIN — ATORVASTATIN CALCIUM 80 MILLIGRAM(S): 80 TABLET, FILM COATED ORAL at 21:45

## 2023-09-01 RX ADMIN — Medication 6: at 11:32

## 2023-09-01 RX ADMIN — APIXABAN 5 MILLIGRAM(S): 2.5 TABLET, FILM COATED ORAL at 05:33

## 2023-09-01 RX ADMIN — SENNA PLUS 2 TABLET(S): 8.6 TABLET ORAL at 21:45

## 2023-09-01 RX ADMIN — CHLORHEXIDINE GLUCONATE 1 APPLICATION(S): 213 SOLUTION TOPICAL at 05:31

## 2023-09-01 RX ADMIN — PIPERACILLIN AND TAZOBACTAM 25 GRAM(S): 4; .5 INJECTION, POWDER, LYOPHILIZED, FOR SOLUTION INTRAVENOUS at 05:34

## 2023-09-01 RX ADMIN — LEVETIRACETAM 1000 MILLIGRAM(S): 250 TABLET, FILM COATED ORAL at 05:33

## 2023-09-01 RX ADMIN — Medication 4: at 17:20

## 2023-09-01 RX ADMIN — APIXABAN 5 MILLIGRAM(S): 2.5 TABLET, FILM COATED ORAL at 17:21

## 2023-09-01 RX ADMIN — PRIMIDONE 100 MILLIGRAM(S): 250 TABLET ORAL at 06:18

## 2023-09-01 RX ADMIN — PIPERACILLIN AND TAZOBACTAM 25 GRAM(S): 4; .5 INJECTION, POWDER, LYOPHILIZED, FOR SOLUTION INTRAVENOUS at 13:41

## 2023-09-01 RX ADMIN — Medication 25 MILLIGRAM(S): at 17:20

## 2023-09-01 RX ADMIN — TAMSULOSIN HYDROCHLORIDE 0.4 MILLIGRAM(S): 0.4 CAPSULE ORAL at 21:44

## 2023-09-01 RX ADMIN — POLYETHYLENE GLYCOL 3350 17 GRAM(S): 17 POWDER, FOR SOLUTION ORAL at 06:18

## 2023-09-01 RX ADMIN — Medication 4 UNIT(S): at 17:20

## 2023-09-01 RX ADMIN — BUDESONIDE AND FORMOTEROL FUMARATE DIHYDRATE 2 PUFF(S): 160; 4.5 AEROSOL RESPIRATORY (INHALATION) at 05:32

## 2023-09-01 RX ADMIN — Medication 4 UNIT(S): at 07:53

## 2023-09-01 NOTE — PROGRESS NOTE ADULT - ASSESSMENT
72 year old male PMH of trach  s/p decannulation, s/p PEG removal,  COPD, CHF, DM, seizure disorder, BPH came from [facility] for SOB., admitted with:    #acute on chronic hypoxic/ hypercarbic resp. failure likely copd exacerbation vs  r/o CHF  c/w  iv zosyn (DAY 5) for possible aspiration  leucocytosis resolved   blood cx negative till date, Urine legionella Ag negative  c/w steroids qd (day 4), duonebs  c/w lasix  ECHO resuts reviewed  bipap nocturnal  aspiration precautions  CT chest showed trace and small pleural effusions on right and left respectively-- no plan fro thoracentesis for now  follow up pulmonology    DM2 with hyperglycemia- - better controlled, Monitor fsbs/c/w Lantus/ ISS/ c/w bolus insulin    ELMER creat  improving 1.20   avoid nephrotoxic meds  encourage pO hydration, monitor renal function    stage II sacral ulcer POA- wound care    c/w all home meds- Flomax, VPA, Keppra change to PO form, on regular DM diet, Lipitor, Toprol, Eliquis and Primidone.   Given elevated Keppra level, decreased keppra from 1500 to 1000 bid, spoke with Neurologist DR Loo today, advised to repeat Keppra level in week    eliquis for dvt ppx  awaiting speech and swallow eval today  awaiting PT eval for placement back to [facility]      d/w pt and son by bed side.   Likely DC to [facility] tomorrow

## 2023-09-01 NOTE — DISCHARGE NOTE PROVIDER - NSDCMRMEDTOKEN_GEN_ALL_CORE_FT
albuterol 90 mcg/inh inhalation aerosol: 2 puff(s) inhaled every 6 hours, As Needed -for shortness of breath and/or wheezing   apixaban 5 mg oral tablet: 1 tab(s) orally every 12 hours  aspirin 81 mg oral delayed release tablet: 1 tab(s) orally once a day  atorvastatin 80 mg oral tablet: 1 tab(s) orally once a day (at bedtime)  cyanocobalamin 1000 mcg oral tablet: 1 tab(s) orally once a day  insulin glargine 100 units/mL subcutaneous solution: 20 unit(s) subcutaneous once a day (at bedtime)  insulin lispro 100 units/mL injectable solution: 1 unit(s) injectable 3 times a day 2 Unit(s) if Glucose 151 - 200  4 Unit(s) if Glucose 201 - 250  6 Unit(s) if Glucose 251 - 300  8 Unit(s) if Glucose 301 - 350  10 Unit(s) if Glucose 351 - 400  12 Unit(s) if Glucose Greater Than 400  ipratropium-albuterol 0.5 mg-2.5 mg/3 mL inhalation solution: 3 milliliter(s) inhaled every 6 hours As needed Shortness of Breath and/or Wheezing  Lasix 20 mg oral tablet: 1 tab(s) orally once a day (at bedtime)  levETIRAcetam 1000 mg oral tablet: 1 tab(s) orally 2 times a day  Liquifilm Tears preserved ophthalmic solution: 1 drop(s) to each affected eye 2 times a day  metoprolol tartrate 25 mg oral tablet: 1 tab(s) orally 2 times a day  Multiple Vitamins with Minerals oral liquid: 1  orally once a day  polyethylene glycol 3350 oral powder for reconstitution: 17 gram(s) orally 2 times a day  predniSONE 20 mg oral tablet: 2 tab(s) orally once a day for 5 days  primidone 50 mg oral tablet: 2 tab(s) orally once a day  primidone 50 mg oral tablet: 2 tab(s) orally once a day (at bedtime)  senna leaf extract oral tablet: 2 tab(s) orally once a day (at bedtime)  tamsulosin 0.4 mg oral capsule: 1 cap(s) orally once a day (at bedtime)  tiotropium 2.5 mcg/inh inhalation aerosol: 2 puff(s) inhaled once a day  valproic acid 250 mg oral capsule: 3 cap(s) orally once a day  valproic acid 250 mg oral capsule: 4 cap(s) orally once a day (at bedtime)

## 2023-09-01 NOTE — PROGRESS NOTE ADULT - SUBJECTIVE AND OBJECTIVE BOX
Patient is a 73y old  Male who presents with a chief complaint of SOB (02 Sep 2023 08:52)      OVERNIGHT EVENTS:  Patients seen and examined at bedside this morning. No acute events overnight.    REVIEW OF SYSTEMS: denies chest pain/SOB, diaphoresis, no F/C, cough, dizziness, headache, blurry vision, nausea, vomiting, abdominal pain. All others review of systems negative     MEDICATIONS  (STANDING):  apixaban 5 milliGRAM(s) Oral every 12 hours  aspirin enteric coated 81 milliGRAM(s) Oral daily  atorvastatin 80 milliGRAM(s) Oral at bedtime  budesonide 160 MICROgram(s)/formoterol 4.5 MICROgram(s) Inhaler 2 Puff(s) Inhalation two times a day  chlorhexidine 2% Cloths 1 Application(s) Topical <User Schedule>  cyanocobalamin 1000 MICROGram(s) Oral daily  dextrose 5%. 1000 milliLiter(s) (50 mL/Hr) IV Continuous <Continuous>  dextrose 5%. 1000 milliLiter(s) (100 mL/Hr) IV Continuous <Continuous>  dextrose 50% Injectable 25 Gram(s) IV Push once  dextrose 50% Injectable 25 Gram(s) IV Push once  dextrose 50% Injectable 12.5 Gram(s) IV Push once  furosemide   Injectable 40 milliGRAM(s) IV Push daily  glucagon  Injectable 1 milliGRAM(s) IntraMuscular once  insulin glargine Injectable (LANTUS) 20 Unit(s) SubCutaneous at bedtime  insulin lispro (ADMELOG) corrective regimen sliding scale   SubCutaneous three times a day before meals  insulin lispro Injectable (ADMELOG) 4 Unit(s) SubCutaneous three times a day before meals  levETIRAcetam 1000 milliGRAM(s) Oral two times a day  methylPREDNISolone sodium succinate Injectable 40 milliGRAM(s) IV Push daily  metoprolol tartrate 25 milliGRAM(s) Oral two times a day  piperacillin/tazobactam IVPB.. 3.375 Gram(s) IV Intermittent every 8 hours  polyethylene glycol 3350 17 Gram(s) Oral two times a day  primidone 100 milliGRAM(s) Oral daily  primidone 100 milliGRAM(s) Oral at bedtime  senna 2 Tablet(s) Oral at bedtime  tamsulosin 0.4 milliGRAM(s) Oral at bedtime  tiotropium 2.5 MICROgram(s) Inhaler 2 Puff(s) Inhalation daily  valproic acid 750 milliGRAM(s) Oral daily  valproic acid 1000 milliGRAM(s) Oral at bedtime    MEDICATIONS  (PRN):  albuterol/ipratropium for Nebulization 3 milliLiter(s) Nebulizer every 6 hours PRN Shortness of Breath and/or Wheezing  dextrose Oral Gel 15 Gram(s) Oral once PRN Blood Glucose LESS THAN 70 milliGRAM(s)/deciliter      Allergies    No Known Allergies    Intolerances        T(F): 97.5 (09-02-23 @ 10:48), Max: 98.1 (09-01-23 @ 16:25)  HR: 68 (09-02-23 @ 10:48) (52 - 70)  BP: 133/80 (09-02-23 @ 10:48) (119/59 - 134/78)  RR: 18 (09-02-23 @ 10:48) (16 - 18)  SpO2: 97% (09-02-23 @ 10:48) (94% - 100%)  Wt(kg): --    PHYSICAL EXAM:  GENERAL: NAD  HEAD:  Atraumatic, Normocephalic  EYES: PERRLA, conjunctiva and sclera clear  ENMT: Moist mucous membranes  NECK: Supple, No JVD, Normal thyroid  NERVOUS SYSTEM:  Alert & Awake  CHEST/LUNG: Clear to percussion bilaterally;   HEART: Regular rate and rhythm;   ABDOMEN: Soft, Nontender, Nondistended; Bowel sounds present  EXTREMITIES:  no edema BL LE  SKIN: moist    LABS:    09-01    140  |  97  |  41<H>  ----------------------------<  159<H>  3.5   |  36<H>  |  1.11    Ca    8.5      01 Sep 2023 07:05        Urinalysis Basic - ( 01 Sep 2023 07:05 )    Color: x / Appearance: x / SG: x / pH: x  Gluc: 159 mg/dL / Ketone: x  / Bili: x / Urobili: x   Blood: x / Protein: x / Nitrite: x   Leuk Esterase: x / RBC: x / WBC x   Sq Epi: x / Non Sq Epi: x / Bacteria: x      Cultures;   CAPILLARY BLOOD GLUCOSE      POCT Blood Glucose.: 236 mg/dL (02 Sep 2023 11:55)  POCT Blood Glucose.: 138 mg/dL (02 Sep 2023 07:59)  POCT Blood Glucose.: 120 mg/dL (01 Sep 2023 21:30)  POCT Blood Glucose.: 215 mg/dL (01 Sep 2023 16:55)    Lipid panel:           RADIOLOGY & ADDITIONAL TESTS:    Imaging Personally Reviewed:  [x ] YES      Consultant(s) Notes Reviewed:  [x ] YES     Care Discussed with [x ] Consultants [X ] Patient [ ] Family  [x ]    [x ]  Other; RN

## 2023-09-01 NOTE — DISCHARGE NOTE PROVIDER - CARE PROVIDER_API CALL
Mark Loo  Neurology  [address] [place] [address], [address]  [place] [address]  Phone: [phone]  Fax: [phone]  Follow Up Time:

## 2023-09-01 NOTE — PROGRESS NOTE ADULT - ASSESSMENT
REASON FOR VISIT  .. Management of problems listed below      PHYSICAL EXAM    HEENT Unremarkable  atraumatic   RESP Fair air entry  Harsh breath sound   CARDIAC S1 S2 No S3     NO JVD    ABDOMEN No hepatosplenomegaly   PEDAL EDEMA present No calf tenderness  NO rash       GENERAL DATA .     GOC.    .. 8/28/2023 full code   ICU STAY.   .. none  COVID.   ..    BEST PRACTICE ISSUES.    HOB ELEVATN.   .. Yes  DVT PPLX.   ..  8/27 apixaba 5.2   ( af)   SPEECH SWALLOW RECOMMENDATIONS.    ..        DIET.    ..  8/28/2023 cons carb   IV fl.  ..   STRESS ULCER PPLX.   ..      INFECTION PPLX.   ..   8/28 chlorhexidine 2%   ALLGY.  ..   nka                      WT.  ..  8/28/2023 71  BMI.  ..    8/28/2023 27    ABGS.   . 8/29/2023 2l 743/53/116  . 8/27/2023 2p 18/8/30 732/60/75   . 8/27/20231 1p100% nrb 732/112/111     VS/ PO/IO/ VENT/ DRIPS.  9/1/2023 afeb 67 110/50     DOA C/C.        . 8/27/2023 From [facility] 72 m winess d loc 2-3 min while being cleaned then became responsive  called code blue in [facility]         INITIAL PRESENTATION.   . 8/27/2023 72 year old male PMH of trach now removed, PEG now removed, COPD, CHF, DM, PPM, seizure disorder, BPH to ER from [facility] today for SOB. Denies fevers, CP, cough, abdominal pain or leg edema. Son at bedside, seen in ER on BIPAP, Responded well to Steroids, Lasix and Duonebs in ER.   . 8/28/2023 Pulm consultd   PMH.   . CABG 2014,   . 3 stents few years after CABG,   . s/p trach/PEG on 12/5 now decannulated both  . cardiac arrest due to low blood sugar in the past.  . DVT 12/12/2022 l Subclav throm      PROBLEMS/ASSESSMENT/RECOMMENDATIONS (A/R).  PULMONARY.  . GAS EXCHANGE.  .. A/R.   .. Monitor pulse oximetry and target po 90-95%  .. Recommend noct bpap as he has hypercapnia and may use bpap during day   .... 1) if short of breath or  .... 2)  increased wob or  .... 3) increasing lethargy sec to hypercapnia     . COPD   .. 8/28/2023 duoneb  .. 8/28/2023 symbicort    .. 8/27 solumed 20.3   .. 8/28/2023 Change to solumed 40 and taper off over 5d     INFECTION.  . 1) Aspn pneum 8/27  .. w 8/27-8/29-8/30 w 16 - 8.4 - 8.1  .. pr 8/29/2023 pr .4   . cxr 8/27/2023   .... l cardiac device and sternotomy wires   .... New small to mod l pl effs   .... Tr r pl effs   .... mildly increased is lung markings   .. cr ch 8/29/2023 cw 1/13/2023   .... tr r and sm l effs  .... mucous or secretions distal trachea   .... stable 5 mm rml nodule   .... l lo lobe bectasis and cystic changes   .... lingular and l lo lobe atelectasis   .. 8/28 mrsa (-)  .. bc 8/28 (-)   .. 8/28 legn (-)   .. A/R  .. Has leukocytosis Poss aspn pneum Suggest zosyn  Follow blod c sp cs   .. speech eval   .. 8/28/2023 dw Dr Sarabia agrees with zosyn  .. 8/31/2023 recommend 7 d abio May change to augmentin by 9/2 if dc plannd       . A fib  .. 8/27 apixaba 5.2   .. 8/27 metoprolol 25.2  .. 8/28/2023 cont rate control and avnb     . CAD   .. Tr 8/28 tr 73   .. 8/27 ASA 81   .. 8/28/2023 atorvastat 80  .. cont rx     . CHF   .. bnp 8/27/2023 bnp 3124   . 11/14/2022 echo mod decr segmental lvsf apical lateral apiccal ant segment are abn takotsubo cmpthy pasp 42 .  .. 8/28/2023 lasix 40   .. Check echo     HEMAT   .. Hb 8/27-8/29/2023 Hb 14 - 11.8   .. inr 8/27 .94   .. Monitor     RENAL   .. Na 8/27 Na 134   .. cr 8/29-9/1/2023 cr 1.3 - 1.1  .. Monitor     . Syncope   .. 8/30/2023 ct h (-)     . Seizures   .. 8/27 keppra 1500.2     MAIN ISSUES.  . Hypercapnic resp filure 8/27   . Recommend noct bpap  . l Pl effsn 8/27 cxr   .. Ct ch showed v small effs   . Pneumonia   ..  Started on zosyn  .. speech eval   . COPD   On BD steroids   . A fib 8/27 apixaba 5.2  rate control and ac   . Syncope 8/27 8/30 ct h (-)    TIME SPENT.   . Over 36 minutes aggregate care time spent on encounter; activities included   direct patient care, counseling and/or coordinating care reviewing notes, lab data/ imaging , discussion with multidisciplinary team/ patient  /family and explaining in detail risks, benefits, alternatives  of the recommendations     MARQUISE TAYLOR 72 m 8/28/2023 1948 DR OMID SARABIA

## 2023-09-01 NOTE — PROGRESS NOTE ADULT - SUBJECTIVE AND OBJECTIVE BOX
CHIEF COMPLAINT/ REASON FOR VISIT  .. Patient was seen to address the  issue listed under PROBLEM LIST which is located toward bottom of this note     BRANDON CAMARILLO    [address] [address]    Allergies    No Known Allergies    Intolerances        PAST MEDICAL & SURGICAL HISTORY:  HTN (hypertension)      HLD (hyperlipidemia)      Diabetes mellitus      Lacunar infarction      BPH (benign prostatic hyperplasia)      CHF (congestive heart failure)      Chronic obstructive pulmonary disease, unspecified COPD type      BPH without urinary obstruction      History of chronic ischemic heart disease      Cardiac pacemaker      Cardiac arrest      Seizures      TBI (traumatic brain injury)      S/P CABG (coronary artery bypass graft)  2014          FAMILY HISTORY:      Home Medications:  apixaban 5 mg oral tablet: 1 tab(s) orally every 12 hours (27 Aug 2023 13:06)  aspirin 81 mg oral delayed release tablet: 1 tab(s) orally once a day (27 Aug 2023 13:06)  cyanocobalamin 1000 mcg oral tablet: 1 tab(s) orally once a day (27 Aug 2023 13:06)  insulin glargine 100 units/mL subcutaneous solution: 10 unit(s) subcutaneous once a day (at bedtime) (27 Aug 2023 13:06)  levETIRAcetam 100 mg/mL oral solution: 15 milliliter(s) orally 2 times a day (27 Aug 2023 13:06)  Liquifilm Tears preserved ophthalmic solution: 1 drop(s) to each affected eye 2 times a day (27 Aug 2023 13:06)  metoprolol tartrate 25 mg oral tablet: 1 tab(s) orally 2 times a day (27 Aug 2023 13:06)  Multiple Vitamins with Minerals oral liquid: 1  orally once a day (27 Aug 2023 13:06)  primidone 50 mg oral tablet: 1 orally (27 Aug 2023 13:05)  tamsulosin 0.4 mg oral capsule: 1 cap(s) orally once a day (27 Aug 2023 13:06)  valproic acid 250 mg/5 mL oral liquid: 750 mg orally or PEG in AM  1000mg orally or PEG in the evening  (27 Aug 2023 13:06)      MEDICATIONS  (STANDING):  albuterol/ipratropium for Nebulization 3 milliLiter(s) Nebulizer every 6 hours  apixaban 5 milliGRAM(s) Oral every 12 hours  aspirin enteric coated 81 milliGRAM(s) Oral daily  atorvastatin 80 milliGRAM(s) Oral at bedtime  budesonide 160 MICROgram(s)/formoterol 4.5 MICROgram(s) Inhaler 2 Puff(s) Inhalation two times a day  chlorhexidine 2% Cloths 1 Application(s) Topical <User Schedule>  cyanocobalamin 1000 MICROGram(s) Oral daily  dextrose 5%. 1000 milliLiter(s) (50 mL/Hr) IV Continuous <Continuous>  dextrose 5%. 1000 milliLiter(s) (100 mL/Hr) IV Continuous <Continuous>  dextrose 50% Injectable 25 Gram(s) IV Push once  dextrose 50% Injectable 25 Gram(s) IV Push once  dextrose 50% Injectable 12.5 Gram(s) IV Push once  furosemide   Injectable 40 milliGRAM(s) IV Push daily  glucagon  Injectable 1 milliGRAM(s) IntraMuscular once  insulin glargine Injectable (LANTUS) 20 Unit(s) SubCutaneous at bedtime  insulin lispro (ADMELOG) corrective regimen sliding scale   SubCutaneous three times a day before meals  insulin lispro Injectable (ADMELOG) 4 Unit(s) SubCutaneous three times a day before meals  levETIRAcetam 1000 milliGRAM(s) Oral two times a day  methylPREDNISolone sodium succinate Injectable 40 milliGRAM(s) IV Push daily  metoprolol tartrate 25 milliGRAM(s) Oral two times a day  piperacillin/tazobactam IVPB.. 3.375 Gram(s) IV Intermittent every 8 hours  polyethylene glycol 3350 17 Gram(s) Oral two times a day  primidone 100 milliGRAM(s) Oral daily  primidone 100 milliGRAM(s) Oral at bedtime  senna 2 Tablet(s) Oral at bedtime  tamsulosin 0.4 milliGRAM(s) Oral at bedtime  valproic acid 750 milliGRAM(s) Oral daily  valproic acid 1000 milliGRAM(s) Oral at bedtime    MEDICATIONS  (PRN):  dextrose Oral Gel 15 Gram(s) Oral once PRN Blood Glucose LESS THAN 70 milliGRAM(s)/deciliter              Vital Signs Last 24 Hrs  T(C): 36.3 (01 Sep 2023 04:52), Max: 37.1 (01 Sep 2023 00:18)  T(F): 97.4 (01 Sep 2023 04:52), Max: 98.8 (01 Sep 2023 00:18)  HR: 64 (01 Sep 2023 06:27) (60 - 79)  BP: 120/67 (01 Sep 2023 04:52) (120/67 - 144/80)  BP(mean): --  RR: 18 (01 Sep 2023 04:52) (17 - 20)  SpO2: 95% (01 Sep 2023 06:27) (93% - 99%)    Parameters below as of 01 Sep 2023 06:27  Patient On (Oxygen Delivery Method): BiPAP/CPAP          08-30-23 @ 07:01  -  08-31-23 @ 07:00  --------------------------------------------------------  IN: 590 mL / OUT: 0 mL / NET: 590 mL    08-31-23 @ 07:01  -  09-01-23 @ 06:46  --------------------------------------------------------  IN: 360 mL / OUT: 0 mL / NET: 360 mL              LABS:                        11.6   8.11  )-----------( 164      ( 30 Aug 2023 07:40 )             34.1     08-30    141  |  100  |  43<H>  ----------------------------<  265<H>  3.9   |  35<H>  |  1.20    Ca    8.3<L>      30 Aug 2023 07:40    TPro  5.9<L>  /  Alb  2.4<L>  /  TBili  0.3  /  DBili  x   /  AST  12<L>  /  ALT  31  /  AlkPhos  72  08-30      Urinalysis Basic - ( 30 Aug 2023 07:40 )    Color: x / Appearance: x / SG: x / pH: x  Gluc: 265 mg/dL / Ketone: x  / Bili: x / Urobili: x   Blood: x / Protein: x / Nitrite: x   Leuk Esterase: x / RBC: x / WBC x   Sq Epi: x / Non Sq Epi: x / Bacteria: x            WBC:  WBC Count: 8.11 K/uL (08-30 @ 07:40)  WBC Count: 8.43 K/uL (08-29 @ 07:42)      MICROBIOLOGY:  RECENT CULTURES:  08-28 .Blood Blood XXXX XXXX   No growth at 72 Hours    08-28 .Sputum Sputum XXXX   Few polymorphonuclear leukocytes per low power field  Moderate Squamous epithelial cells per low power field  Numerous Gram positive cocci in pairs per oil power field  Moderate Gram Positive Rods per oil power field  Moderate Gram Negative Rods per oil power field   Normal Respiratory Larissa present                    Sodium:  Sodium: 141 mmol/L (08-30 @ 07:40)  Sodium: 135 mmol/L (08-29 @ 07:42)      1.20 mg/dL 08-30 @ 07:40  1.37 mg/dL 08-29 @ 07:42      Hemoglobin:  Hemoglobin: 11.6 g/dL (08-30 @ 07:40)  Hemoglobin: 11.8 g/dL (08-29 @ 07:42)      Platelets: Platelet Count - Automated: 164 K/uL (08-30 @ 07:40)  Platelet Count - Automated: 170 K/uL (08-29 @ 07:42)      LIVER FUNCTIONS - ( 30 Aug 2023 07:40 )  Alb: 2.4 g/dL / Pro: 5.9 gm/dL / ALK PHOS: 72 U/L / ALT: 31 U/L / AST: 12 U/L / GGT: x             Urinalysis Basic - ( 30 Aug 2023 07:40 )    Color: x / Appearance: x / SG: x / pH: x  Gluc: 265 mg/dL / Ketone: x  / Bili: x / Urobili: x   Blood: x / Protein: x / Nitrite: x   Leuk Esterase: x / RBC: x / WBC x   Sq Epi: x / Non Sq Epi: x / Bacteria: x        RADIOLOGY & ADDITIONAL STUDIES:      MICROBIOLOGY:  RECENT CULTURES:  08-28 .Blood Blood XXXX XXXX   No growth at 72 Hours    08-28 .Sputum Sputum XXXX   Few polymorphonuclear leukocytes per low power field  Moderate Squamous epithelial cells per low power field  Numerous Gram positive cocci in pairs per oil power field  Moderate Gram Positive Rods per oil power field  Moderate Gram Negative Rods per oil power field   Normal Respiratory Larissa present

## 2023-09-01 NOTE — DISCHARGE NOTE PROVIDER - NSDCCPCAREPLAN_GEN_ALL_CORE_FT
PRINCIPAL DISCHARGE DIAGNOSIS  Diagnosis: Acute hypercapnic respiratory failure  Assessment and Plan of Treatment: 72 year old male PMH of trach  s/p decannulation, s/p PEG removal, COPD, CHF, DM, seizure disorder, BPH came from [facility] for SOB, admitted with:  #acute on chronic hypoxic/ hypercarbic resp. failure likely copd exacerbation   c/w  iv zosyn (DAY 5) for possible aspiration  leucocytosis resolved   blood cx negative till date, Urine legionella Ag negative  c/w steroids qd (day 4), duonebs  c/w lasix  ECHO resuts ; Normal global left ventricular systolic function.  bipap nocturnal  aspiration precautions  CT chest showed trace and small pleural effusions on right and left respectively-- no plan fro thoracentesis for now  follow up pulmonology  DM2 with hyperglycemia- - cont home meds  ELMER creat  improving 1.20   avoid nephrotoxic meds  encourage pO hydration, monitor renal function  stage II sacral ulcer POA- wound care  c/w all home meds- Flomax, VPA, Keppra change to PO form, on regular DM diet, Lipitor, Toprol, Eliquis and Primidone.   Given elevated Keppra level, decreased keppra from 1500 to 1000 bid, Neurologist DR Loo, advised to repeat Keppra level in week  eliquis for dvt ppx  dysphagia;  speech and swallow eval; puree diet  Outpatient PCP follow up is recommended.   Seen and examined by me today. Vitals stable.   I have discussed all the inpatient radiographic findings with the patient and stressed that patient follows with the PCP for further outpatient care. I have educated patient to use [facility] patient portal (as instructed on the discharge paperwork) to access medical records outside the hospital.   All questions welcomed and answered appropriately. Patient verbalized understanding of post discharge physician's follows up and discharge instructions.   DC time spent by me face to face excluding billable procedures 38 mins      SECONDARY DISCHARGE DIAGNOSES  Diagnosis: Acute pulmonary edema  Assessment and Plan of Treatment:

## 2023-09-01 NOTE — DISCHARGE NOTE PROVIDER - HOSPITAL COURSE
72 year old male PMH of trach  s/p decannulation, s/p PEG removal, COPD, CHF, DM, seizure disorder, BPH came from [facility] for SOB, admitted with:    #acute on chronic hypoxic/ hypercarbic resp. failure likely copd exacerbation   c/w  iv zosyn (DAY 5) for possible aspiration  leucocytosis resolved   blood cx negative till date, Urine legionella Ag negative  c/w steroids qd (day 4), duonebs  c/w lasix  ECHO resuts ; Normal global left ventricular systolic function.  bipap nocturnal  aspiration precautions  CT chest showed trace and small pleural effusions on right and left respectively-- no plan fro thoracentesis for now  follow up pulmonology    DM2 with hyperglycemia- - cont home meds  ELMER creat  improving 1.20   avoid nephrotoxic meds  encourage pO hydration, monitor renal function    stage II sacral ulcer POA- wound care    c/w all home meds- Flomax, VPA, Keppra change to PO form, on regular DM diet, Lipitor, Toprol, Eliquis and Primidone.   Given elevated Keppra level, decreased keppra from 1500 to 1000 bid, Neurologist DR Loo, advised to repeat Keppra level in week  eliquis for dvt ppx  dysphagia;  speech and swallow eval; puree diet    Outpatient PCP follow up is recommended.     Seen and examined by me today. Vitals stable.   I have discussed all the inpatient radiographic findings with the patient and stressed that patient follows with the PCP for further outpatient care. I have educated patient to use [facility] patient portal (as instructed on the discharge paperwork) to access medical records outside the hospital.   All questions welcomed and answered appropriately. Patient verbalized understanding of post discharge physician's follows up and discharge instructions.   DC time spent by me face to face excluding billable procedures 38 mins

## 2023-09-01 NOTE — DISCHARGE NOTE PROVIDER - ATTENDING DISCHARGE PHYSICAL EXAMINATION:
Vital Signs Last 24 Hrs  T(C): 36.9 (01 Sep 2023 10:40), Max: 37.1 (01 Sep 2023 00:18)  T(F): 98.4 (01 Sep 2023 10:40), Max: 98.8 (01 Sep 2023 00:18)  HR: 69 (01 Sep 2023 10:40) (59 - 76)  BP: 102/69 (01 Sep 2023 10:40) (102/69 - 144/80)  BP(mean): --  RR: 18 (01 Sep 2023 10:40) (17 - 18)  SpO2: 98% (01 Sep 2023 10:40) (93% - 99%)    Parameters below as of 01 Sep 2023 06:27  Patient On (Oxygen Delivery Method): BiPAP/CPAP            GENERAL: NAD  HEAD:  Atraumatic, Normocephalic  EYES: EOMI, PERRLA, conjunctiva and sclera clear  ENMT: No tonsillar erythema, exudates, or enlargement; Moist mucous membranes, Good dentition, No lesions  NECK: Supple, No JVD, Normal thyroid  NERVOUS SYSTEM:  Alert & Oriented X3, Good concentration; Motor Strength 5/5 B/L upper and lower extremities;   CHEST/LUNG: Clear to percussion bilaterally;   HEART: Regular rate and rhythm;   ABDOMEN: Soft, Nontender, Nondistended; Bowel sounds present  EXTREMITIES:  2+ Peripheral Pulses, No clubbing, cyanosis, or edema  SKIN: No rashes or lesions

## 2023-09-01 NOTE — DISCHARGE NOTE PROVIDER - NSDCFUSCHEDAPPT_GEN_ALL_CORE_FT
Glenn Turner  [facility]  CARDIOLOGY [address]  Scheduled Appointment: 09/14/2023     Glenn Turner  [facility]  CARDIOLOGY [address]  Scheduled Appointment: 09/14/2023    [facility]  NEUROLOGY  Comm D  Scheduled Appointment: 11/30/2023

## 2023-09-02 ENCOUNTER — TRANSCRIPTION ENCOUNTER (OUTPATIENT)
Age: 73
End: 2023-09-02

## 2023-09-02 ENCOUNTER — NON-APPOINTMENT (OUTPATIENT)
Age: 73
End: 2023-09-02

## 2023-09-02 VITALS
SYSTOLIC BLOOD PRESSURE: 133 MMHG | TEMPERATURE: 98 F | RESPIRATION RATE: 18 BRPM | OXYGEN SATURATION: 97 % | DIASTOLIC BLOOD PRESSURE: 80 MMHG | HEART RATE: 68 BPM

## 2023-09-02 LAB
GLUCOSE BLDC GLUCOMTR-MCNC: 138 MG/DL — HIGH (ref 70–99)
GLUCOSE BLDC GLUCOMTR-MCNC: 236 MG/DL — HIGH (ref 70–99)

## 2023-09-02 PROCEDURE — 99239 HOSP IP/OBS DSCHRG MGMT >30: CPT

## 2023-09-02 PROCEDURE — 99232 SBSQ HOSP IP/OBS MODERATE 35: CPT

## 2023-09-02 RX ADMIN — PREGABALIN 1000 MICROGRAM(S): 225 CAPSULE ORAL at 12:13

## 2023-09-02 RX ADMIN — Medication 40 MILLIGRAM(S): at 05:07

## 2023-09-02 RX ADMIN — PRIMIDONE 100 MILLIGRAM(S): 250 TABLET ORAL at 06:05

## 2023-09-02 RX ADMIN — Medication 81 MILLIGRAM(S): at 12:13

## 2023-09-02 RX ADMIN — TIOTROPIUM BROMIDE 2 PUFF(S): 18 CAPSULE ORAL; RESPIRATORY (INHALATION) at 12:13

## 2023-09-02 RX ADMIN — LEVETIRACETAM 1000 MILLIGRAM(S): 250 TABLET, FILM COATED ORAL at 05:06

## 2023-09-02 RX ADMIN — Medication 25 MILLIGRAM(S): at 05:07

## 2023-09-02 RX ADMIN — CHLORHEXIDINE GLUCONATE 1 APPLICATION(S): 213 SOLUTION TOPICAL at 05:06

## 2023-09-02 RX ADMIN — Medication 4 UNIT(S): at 08:05

## 2023-09-02 RX ADMIN — APIXABAN 5 MILLIGRAM(S): 2.5 TABLET, FILM COATED ORAL at 05:07

## 2023-09-02 RX ADMIN — Medication 4: at 12:12

## 2023-09-02 RX ADMIN — Medication 4 UNIT(S): at 12:12

## 2023-09-02 RX ADMIN — BUDESONIDE AND FORMOTEROL FUMARATE DIHYDRATE 2 PUFF(S): 160; 4.5 AEROSOL RESPIRATORY (INHALATION) at 05:06

## 2023-09-02 RX ADMIN — PIPERACILLIN AND TAZOBACTAM 25 GRAM(S): 4; .5 INJECTION, POWDER, LYOPHILIZED, FOR SOLUTION INTRAVENOUS at 05:08

## 2023-09-02 RX ADMIN — Medication 750 MILLIGRAM(S): at 06:05

## 2023-09-02 RX ADMIN — POLYETHYLENE GLYCOL 3350 17 GRAM(S): 17 POWDER, FOR SOLUTION ORAL at 05:06

## 2023-09-02 NOTE — DISCHARGE NOTE NURSING/CASE MANAGEMENT/SOCIAL WORK - PATIENT PORTAL LINK FT
You can access the FollowMyHealth Patient Portal offered by [facility] by registering at the following website: [email or web address]. By joining [employer]’s FollowMyHealth portal, you will also be able to view your health information using other applications (apps) compatible with our system.

## 2023-09-02 NOTE — PROGRESS NOTE ADULT - SUBJECTIVE AND OBJECTIVE BOX
CHIEF COMPLAINT/ REASON FOR VISIT  .. Patient was seen to address the  issue listed under PROBLEM LIST which is located toward bottom of this note     BRANDON CAMARILLO    [address]    Allergies    No Known Allergies    Intolerances        PAST MEDICAL & SURGICAL HISTORY:  HTN (hypertension)      HLD (hyperlipidemia)      Diabetes mellitus      Lacunar infarction      BPH (benign prostatic hyperplasia)      CHF (congestive heart failure)      Chronic obstructive pulmonary disease, unspecified COPD type      BPH without urinary obstruction      History of chronic ischemic heart disease      Cardiac pacemaker      Cardiac arrest      Seizures      TBI (traumatic brain injury)      S/P CABG (coronary artery bypass graft)  2014          FAMILY HISTORY:      Home Medications:  apixaban 5 mg oral tablet: 1 tab(s) orally every 12 hours (01 Sep 2023 13:29)  aspirin 81 mg oral delayed release tablet: 1 tab(s) orally once a day (01 Sep 2023 13:29)  atorvastatin 80 mg oral tablet: 1 tab(s) orally once a day (at bedtime) (01 Sep 2023 13:29)  cyanocobalamin 1000 mcg oral tablet: 1 tab(s) orally once a day (27 Aug 2023 13:06)  insulin glargine 100 units/mL subcutaneous solution: 20 unit(s) subcutaneous once a day (at bedtime) (01 Sep 2023 13:29)  insulin lispro 100 units/mL injectable solution: 1 unit(s) injectable 3 times a day 2 Unit(s) if Glucose 151 - 200  4 Unit(s) if Glucose 201 - 250  6 Unit(s) if Glucose 251 - 300  8 Unit(s) if Glucose 301 - 350  10 Unit(s) if Glucose 351 - 400  12 Unit(s) if Glucose Greater Than 400 (01 Sep 2023 13:35)  ipratropium-albuterol 0.5 mg-2.5 mg/3 mL inhalation solution: 3 milliliter(s) inhaled every 6 hours As needed Shortness of Breath and/or Wheezing (01 Sep 2023 13:29)  Lasix 20 mg oral tablet: 1 tab(s) orally once a day (at bedtime) (01 Sep 2023 13:29)  levETIRAcetam 1000 mg oral tablet: 1 tab(s) orally 2 times a day (01 Sep 2023 13:29)  Liquifilm Tears preserved ophthalmic solution: 1 drop(s) to each affected eye 2 times a day (27 Aug 2023 13:06)  metoprolol tartrate 25 mg oral tablet: 1 tab(s) orally 2 times a day (01 Sep 2023 13:29)  Multiple Vitamins with Minerals oral liquid: 1  orally once a day (27 Aug 2023 13:06)  polyethylene glycol 3350 oral powder for reconstitution: 17 gram(s) orally 2 times a day (01 Sep 2023 13:29)  predniSONE 20 mg oral tablet: 2 tab(s) orally once a day for 5 days (01 Sep 2023 13:29)  primidone 50 mg oral tablet: 2 tab(s) orally once a day (01 Sep 2023 13:29)  primidone 50 mg oral tablet: 2 tab(s) orally once a day (at bedtime) (01 Sep 2023 13:29)  senna leaf extract oral tablet: 2 tab(s) orally once a day (at bedtime) (01 Sep 2023 13:29)  tamsulosin 0.4 mg oral capsule: 1 cap(s) orally once a day (at bedtime) (01 Sep 2023 13:29)  tiotropium 2.5 mcg/inh inhalation aerosol: 2 puff(s) inhaled once a day (01 Sep 2023 13:29)  valproic acid 250 mg oral capsule: 3 cap(s) orally once a day (01 Sep 2023 13:29)  valproic acid 250 mg oral capsule: 4 cap(s) orally once a day (at bedtime) (01 Sep 2023 13:29)      MEDICATIONS  (STANDING):  apixaban 5 milliGRAM(s) Oral every 12 hours  aspirin enteric coated 81 milliGRAM(s) Oral daily  atorvastatin 80 milliGRAM(s) Oral at bedtime  budesonide 160 MICROgram(s)/formoterol 4.5 MICROgram(s) Inhaler 2 Puff(s) Inhalation two times a day  chlorhexidine 2% Cloths 1 Application(s) Topical <User Schedule>  cyanocobalamin 1000 MICROGram(s) Oral daily  dextrose 5%. 1000 milliLiter(s) (100 mL/Hr) IV Continuous <Continuous>  dextrose 5%. 1000 milliLiter(s) (50 mL/Hr) IV Continuous <Continuous>  dextrose 50% Injectable 25 Gram(s) IV Push once  dextrose 50% Injectable 25 Gram(s) IV Push once  dextrose 50% Injectable 12.5 Gram(s) IV Push once  furosemide   Injectable 40 milliGRAM(s) IV Push daily  glucagon  Injectable 1 milliGRAM(s) IntraMuscular once  insulin glargine Injectable (LANTUS) 20 Unit(s) SubCutaneous at bedtime  insulin lispro (ADMELOG) corrective regimen sliding scale   SubCutaneous three times a day before meals  insulin lispro Injectable (ADMELOG) 4 Unit(s) SubCutaneous three times a day before meals  levETIRAcetam 1000 milliGRAM(s) Oral two times a day  methylPREDNISolone sodium succinate Injectable 40 milliGRAM(s) IV Push daily  metoprolol tartrate 25 milliGRAM(s) Oral two times a day  piperacillin/tazobactam IVPB.. 3.375 Gram(s) IV Intermittent every 8 hours  polyethylene glycol 3350 17 Gram(s) Oral two times a day  primidone 100 milliGRAM(s) Oral daily  primidone 100 milliGRAM(s) Oral at bedtime  senna 2 Tablet(s) Oral at bedtime  tamsulosin 0.4 milliGRAM(s) Oral at bedtime  tiotropium 2.5 MICROgram(s) Inhaler 2 Puff(s) Inhalation daily  valproic acid 750 milliGRAM(s) Oral daily  valproic acid 1000 milliGRAM(s) Oral at bedtime    MEDICATIONS  (PRN):  albuterol/ipratropium for Nebulization 3 milliLiter(s) Nebulizer every 6 hours PRN Shortness of Breath and/or Wheezing  dextrose Oral Gel 15 Gram(s) Oral once PRN Blood Glucose LESS THAN 70 milliGRAM(s)/deciliter              Vital Signs Last 24 Hrs  T(C): 36.3 (02 Sep 2023 04:44), Max: 36.9 (01 Sep 2023 10:40)  T(F): 97.4 (02 Sep 2023 04:44), Max: 98.4 (01 Sep 2023 10:40)  HR: 56 (02 Sep 2023 05:16) (52 - 70)  BP: 130/79 (02 Sep 2023 04:44) (102/69 - 134/78)  BP(mean): --  RR: 16 (02 Sep 2023 04:44) (16 - 18)  SpO2: 98% (02 Sep 2023 05:16) (95% - 100%)    Parameters below as of 02 Sep 2023 04:44  Patient On (Oxygen Delivery Method): BiPAP/CPAP          09-01-23 @ 07:01  -  09-02-23 @ 07:00  --------------------------------------------------------  IN: 840 mL / OUT: 0 mL / NET: 840 mL              LABS:    09-01    140  |  97  |  41<H>  ----------------------------<  159<H>  3.5   |  36<H>  |  1.11    Ca    8.5      01 Sep 2023 07:05        Urinalysis Basic - ( 01 Sep 2023 07:05 )    Color: x / Appearance: x / SG: x / pH: x  Gluc: 159 mg/dL / Ketone: x  / Bili: x / Urobili: x   Blood: x / Protein: x / Nitrite: x   Leuk Esterase: x / RBC: x / WBC x   Sq Epi: x / Non Sq Epi: x / Bacteria: x            WBC:  WBC Count: 8.11 K/uL (08-30 @ 07:40)      MICROBIOLOGY:  RECENT CULTURES:  08-28 .Blood Blood XXXX XXXX   No growth at 4 days    08-28 .Sputum Sputum XXXX   Few polymorphonuclear leukocytes per low power field  Moderate Squamous epithelial cells per low power field  Numerous Gram positive cocci in pairs per oil power field  Moderate Gram Positive Rods per oil power field  Moderate Gram Negative Rods per oil power field   Normal Respiratory Larissa present                    Sodium:  Sodium: 140 mmol/L (09-01 @ 07:05)  Sodium: 141 mmol/L (08-30 @ 07:40)      1.11 mg/dL 09-01 @ 07:05  1.20 mg/dL 08-30 @ 07:40      Hemoglobin:  Hemoglobin: 11.6 g/dL (08-30 @ 07:40)      Platelets: Platelet Count - Automated: 164 K/uL (08-30 @ 07:40)          Urinalysis Basic - ( 01 Sep 2023 07:05 )    Color: x / Appearance: x / SG: x / pH: x  Gluc: 159 mg/dL / Ketone: x  / Bili: x / Urobili: x   Blood: x / Protein: x / Nitrite: x   Leuk Esterase: x / RBC: x / WBC x   Sq Epi: x / Non Sq Epi: x / Bacteria: x        RADIOLOGY & ADDITIONAL STUDIES:      MICROBIOLOGY:  RECENT CULTURES:  08-28 .Blood Blood XXXX XXXX   No growth at 4 days    08-28 .Sputum Sputum XXXX   Few polymorphonuclear leukocytes per low power field  Moderate Squamous epithelial cells per low power field  Numerous Gram positive cocci in pairs per oil power field  Moderate Gram Positive Rods per oil power field  Moderate Gram Negative Rods per oil power field   Normal Respiratory Larissa present

## 2023-09-02 NOTE — PROGRESS NOTE ADULT - ASSESSMENT
REASON FOR VISIT  .. Management of problems listed below      PHYSICAL EXAM    HEENT Unremarkable  atraumatic   RESP Fair air entry  Harsh breath sound   CARDIAC S1 S2 No S3     NO JVD    ABDOMEN No hepatosplenomegaly   PEDAL EDEMA present No calf tenderness  NO rash       GENERAL DATA .     GOC.    .. 8/28/2023 full code   ICU STAY.   .. none  COVID.   ..    BEST PRACTICE ISSUES.    HOB ELEVATN.   .. Yes  DVT PPLX.   ..  8/27 apixaba 5.2   ( af)   SPEECH SWALLOW RECOMMENDATIONS.    ..        DIET.    ..  8/28/2023 cons carb   IV fl.  ..   STRESS ULCER PPLX.   ..      INFECTION PPLX.   ..   8/28 chlorhexidine 2%   ALLGY.  ..   nka                      WT.  ..  8/28/2023 73  BMI.  ..    8/28/2023 27  PROCEDURES.  ..     ABGS.   . 8/29/2023 2l 743/53/116  . 8/27/2023 2p 18/8/30 732/60/75   . 8/27/20231 1p100% nrb 732/112/111     VS/ PO/IO/ VENT/ DRIPS.  9/2/2023 afeb 60 150/80   9/1/2023 afeb 67 110/50     DOA C/C.        . 8/27/2023 From [facility] 72 m lauren d loc 2-3 min while being cleaned then became responsive  called code blue in [facility]         INITIAL PRESENTATION.   . 8/27/2023 72 year old male PMH of trach now removed, PEG now removed, COPD, CHF, DM, PPM, seizure disorder, BPH to ER from [facility] today for SOB. Denies fevers, CP, cough, abdominal pain or leg edema. Son at bedside, seen in ER on BIPAP, Responded well to Steroids, Lasix and Duonebs in ER.   . 8/28/2023 Pulm consultd   PMH.   . CABG 2014,   . 3 stents few years after CABG,   . s/p trach/PEG on 12/5 now decannulated both  . cardiac arrest due to low blood sugar in the past.  . DVT 12/12/2022 l Subclav throm      PROBLEMS/ASSESSMENT/RECOMMENDATIONS (A/R).  PULMONARY.  . GAS EXCHANGE.  .. A/R.   .. Monitor pulse oximetry and target po 90-95%  .. Recommend noct bpap as he has hypercapnia and may use bpap during day   .... 1) if short of breath or  .... 2)  increased wob or  .... 3) increasing lethargy sec to hypercapnia     . COPD   .. 8/28/2023 duoneb  .. 8/28/2023 symbicort    .. 8/27 solumed 20.3   .. 8/28/2023 Change to solumed 40 and taper off over 5d     INFECTION.  . 1) Aspn pneum 8/27  .. w 8/27-8/29-8/30 w 16 - 8.4 - 8.1  .. pr 8/29/2023 pr .4   . cxr 8/27/2023   .... l cardiac device and sternotomy wires   .... New small to mod l pl effs   .... Tr r pl effs   .... mildly increased is lung markings   .. cr ch 8/29/2023 cw 1/13/2023   .... tr r and sm l effs  .... mucous or secretions distal trachea   .... stable 5 mm rml nodule   .... l lo lobe bectasis and cystic changes   .... lingular and l lo lobe atelectasis   .. 8/28 mrsa (-)  .. bc 8/28 (-)   .. 8/28 legn (-)   .. A/R  .. Has leukocytosis Poss aspn pneum Suggest zosyn  Follow blod c sp cs   .. speech eval   .. 8/28/2023 dw Dr Sarabia agrees with zosyn  .. 8/31/2023 recommend 7 d abio May change to augmentin by 9/2 if dc plannd       . A fib  .. 8/27 apixaba 5.2   .. 8/27 metoprolol 25.2  .. 8/28/2023 cont rate control and avnb     . CAD   .. Tr 8/28 tr 73   .. 8/27 ASA 81   .. 8/28/2023 atorvastat 80  .. cont rx     . CHF   .. bnp 8/27/2023 bnp 3124   . 11/14/2022 echo mod decr segmental lvsf apical lateral apiccal ant segment are abn takotsubo cmpthy pasp 42 .  .. 8/28/2023 lasix 40   .. Check echo     HEMAT   .. Hb 8/27-8/29/2023 Hb 14 - 11.8   .. inr 8/27 .94   .. Monitor     RENAL   .. Na 8/27 Na 134   .. cr 8/29-9/1/2023 cr 1.3 - 1.1  .. Monitor     . Syncope   .. 8/30/2023 ct h (-)     . Seizures   .. 8/27 keppra 1500.2     MAIN ISSUES.  . Hypercapnic resp filure 8/27   . Recommend noct bpap  . l Pl effsn 8/27 cxr   .. Ct ch showed v small effs   . Pneumonia   ..  Started on zosyn  .. speech eval   . COPD   On BD steroids   . A fib 8/27 apixaba 5.2  rate control and ac   . Syncope 8/27 8/30 ct h (-)    TIME SPENT.   . Over 36 minutes aggregate care time spent on encounter; activities included   direct patient care, counseling and/or coordinating care reviewing notes, lab data/ imaging , discussion with multidisciplinary team/ patient  /family and explaining in detail risks, benefits, alternatives  of the recommendations     MARQUISE TAYLOR 72 m 8/28/2023 1950 DR OMID SARABIA

## 2023-09-02 NOTE — PROGRESS NOTE ADULT - PROVIDER SPECIALTY LIST ADULT
Pulmonology
Pulmonology
Hospitalist
Hospitalist
Pulmonology
Hospitalist

## 2023-09-03 ENCOUNTER — NON-APPOINTMENT (OUTPATIENT)
Age: 73
End: 2023-09-03

## 2023-09-03 LAB
CULTURE RESULTS: SIGNIFICANT CHANGE UP
SPECIMEN SOURCE: SIGNIFICANT CHANGE UP

## 2023-09-07 DIAGNOSIS — E11.65 TYPE 2 DIABETES MELLITUS WITH HYPERGLYCEMIA: ICD-10-CM

## 2023-09-07 DIAGNOSIS — Z79.4 LONG TERM (CURRENT) USE OF INSULIN: ICD-10-CM

## 2023-09-07 DIAGNOSIS — N17.9 ACUTE KIDNEY FAILURE, UNSPECIFIED: ICD-10-CM

## 2023-09-07 DIAGNOSIS — Z86.74 PERSONAL HISTORY OF SUDDEN CARDIAC ARREST: ICD-10-CM

## 2023-09-07 DIAGNOSIS — J44.1 CHRONIC OBSTRUCTIVE PULMONARY DISEASE WITH (ACUTE) EXACERBATION: ICD-10-CM

## 2023-09-07 DIAGNOSIS — I25.10 ATHEROSCLEROTIC HEART DISEASE OF NATIVE CORONARY ARTERY WITHOUT ANGINA PECTORIS: ICD-10-CM

## 2023-09-07 DIAGNOSIS — J69.0 PNEUMONITIS DUE TO INHALATION OF FOOD AND VOMIT: ICD-10-CM

## 2023-09-07 DIAGNOSIS — Z79.82 LONG TERM (CURRENT) USE OF ASPIRIN: ICD-10-CM

## 2023-09-07 DIAGNOSIS — Z95.0 PRESENCE OF CARDIAC PACEMAKER: ICD-10-CM

## 2023-09-07 DIAGNOSIS — I11.0 HYPERTENSIVE HEART DISEASE WITH HEART FAILURE: ICD-10-CM

## 2023-09-07 DIAGNOSIS — G40.909 EPILEPSY, UNSPECIFIED, NOT INTRACTABLE, WITHOUT STATUS EPILEPTICUS: ICD-10-CM

## 2023-09-07 DIAGNOSIS — Z95.5 PRESENCE OF CORONARY ANGIOPLASTY IMPLANT AND GRAFT: ICD-10-CM

## 2023-09-07 DIAGNOSIS — J96.22 ACUTE AND CHRONIC RESPIRATORY FAILURE WITH HYPERCAPNIA: ICD-10-CM

## 2023-09-07 DIAGNOSIS — J96.21 ACUTE AND CHRONIC RESPIRATORY FAILURE WITH HYPOXIA: ICD-10-CM

## 2023-09-07 DIAGNOSIS — R55 SYNCOPE AND COLLAPSE: ICD-10-CM

## 2023-09-07 DIAGNOSIS — Z87.820 PERSONAL HISTORY OF TRAUMATIC BRAIN INJURY: ICD-10-CM

## 2023-09-07 DIAGNOSIS — R13.10 DYSPHAGIA, UNSPECIFIED: ICD-10-CM

## 2023-09-07 DIAGNOSIS — N40.0 BENIGN PROSTATIC HYPERPLASIA WITHOUT LOWER URINARY TRACT SYMPTOMS: ICD-10-CM

## 2023-09-07 DIAGNOSIS — I50.9 HEART FAILURE, UNSPECIFIED: ICD-10-CM

## 2023-09-07 DIAGNOSIS — I48.91 UNSPECIFIED ATRIAL FIBRILLATION: ICD-10-CM

## 2023-09-07 DIAGNOSIS — Z79.01 LONG TERM (CURRENT) USE OF ANTICOAGULANTS: ICD-10-CM

## 2023-09-07 DIAGNOSIS — E78.5 HYPERLIPIDEMIA, UNSPECIFIED: ICD-10-CM

## 2023-09-07 DIAGNOSIS — L89.152 PRESSURE ULCER OF SACRAL REGION, STAGE 2: ICD-10-CM

## 2023-09-07 DIAGNOSIS — Z95.1 PRESENCE OF AORTOCORONARY BYPASS GRAFT: ICD-10-CM

## 2023-09-07 DIAGNOSIS — Z86.718 PERSONAL HISTORY OF OTHER VENOUS THROMBOSIS AND EMBOLISM: ICD-10-CM

## 2023-09-07 NOTE — HISTORY OF PRESENT ILLNESS
[FreeTextEntry1] : 73M w cad htn hld presents to establish care Sent in by: PMD:   CP, SOB, at rest or on exertion. Denies palpitations, dizziness, diaphoresis, syncope, LE edema, orthopnea  pt had trache/peg  ? removed?  Exercise: Diet:   Prev cardiac history: Previous cardiac testing: Recent labs:   EKG:   Med hx: Sx hx:	 Family hx: no known cardiac hx Social hx:  lives in     with  , feels safe at home. occupation    tob/etoh/drugs Meds: amio 200 asa 81 plavix lipitor 40 lasix 20 po glipizide metformin  Allergies: nkda

## 2023-09-13 PROBLEM — Z86.79 PERSONAL HISTORY OF OTHER DISEASES OF THE CIRCULATORY SYSTEM: Chronic | Status: ACTIVE | Noted: 2023-08-27

## 2023-09-13 PROBLEM — R56.9 UNSPECIFIED CONVULSIONS: Chronic | Status: ACTIVE | Noted: 2023-08-27

## 2023-09-13 PROBLEM — S06.9XAA UNSPECIFIED INTRACRANIAL INJURY WITH LOSS OF CONSCIOUSNESS STATUS UNKNOWN, INITIAL ENCOUNTER: Chronic | Status: ACTIVE | Noted: 2023-08-27

## 2023-09-13 PROBLEM — N40.0 BENIGN PROSTATIC HYPERPLASIA WITHOUT LOWER URINARY TRACT SYMPTOMS: Chronic | Status: ACTIVE | Noted: 2023-08-27

## 2023-09-13 PROBLEM — I46.9 CARDIAC ARREST, CAUSE UNSPECIFIED: Chronic | Status: ACTIVE | Noted: 2023-08-27

## 2023-09-13 PROBLEM — Z95.0 PRESENCE OF CARDIAC PACEMAKER: Chronic | Status: ACTIVE | Noted: 2023-08-27

## 2023-09-14 ENCOUNTER — NON-APPOINTMENT (OUTPATIENT)
Age: 73
End: 2023-09-14

## 2023-09-14 ENCOUNTER — APPOINTMENT (OUTPATIENT)
Dept: CARDIOLOGY | Facility: CLINIC | Age: 73
End: 2023-09-14
Payer: MEDICAID

## 2023-09-14 VITALS
WEIGHT: 154 LBS | TEMPERATURE: 98.2 F | HEART RATE: 79 BPM | OXYGEN SATURATION: 97 % | SYSTOLIC BLOOD PRESSURE: 122 MMHG | HEIGHT: 69 IN | BODY MASS INDEX: 22.81 KG/M2 | DIASTOLIC BLOOD PRESSURE: 74 MMHG

## 2023-09-14 PROCEDURE — 99204 OFFICE O/P NEW MOD 45 MIN: CPT

## 2023-09-14 PROCEDURE — 93000 ELECTROCARDIOGRAM COMPLETE: CPT

## 2023-09-21 NOTE — H&P ADULT - SEXUAL ORIENTATION
Needs appt to discuss - I see he is scheduled with me for next week  If he is not safe -> ER  Can we please check in with mom about this plan?    Straight, Heterosexual

## 2023-10-03 ENCOUNTER — APPOINTMENT (OUTPATIENT)
Dept: PULMONOLOGY | Facility: CLINIC | Age: 73
End: 2023-10-03
Payer: MEDICAID

## 2023-10-03 VITALS
OXYGEN SATURATION: 99 % | DIASTOLIC BLOOD PRESSURE: 76 MMHG | HEIGHT: 69 IN | SYSTOLIC BLOOD PRESSURE: 153 MMHG | HEART RATE: 47 BPM

## 2023-10-03 DIAGNOSIS — J96.92 RESPIRATORY FAILURE, UNSPECIFIED WITH HYPERCAPNIA: ICD-10-CM

## 2023-10-03 PROCEDURE — 99214 OFFICE O/P EST MOD 30 MIN: CPT

## 2023-10-03 RX ORDER — BUDESONIDE AND FORMOTEROL FUMARATE DIHYDRATE 160; 4.5 UG/1; UG/1
160-4.5 AEROSOL RESPIRATORY (INHALATION) TWICE DAILY
Qty: 2 | Refills: 4 | Status: ACTIVE | COMMUNITY
Start: 2023-10-03 | End: 1900-01-01

## 2023-10-05 ENCOUNTER — APPOINTMENT (OUTPATIENT)
Dept: ELECTROPHYSIOLOGY | Facility: CLINIC | Age: 73
End: 2023-10-05
Payer: MEDICAID

## 2023-10-05 ENCOUNTER — NON-APPOINTMENT (OUTPATIENT)
Age: 73
End: 2023-10-05

## 2023-10-05 VITALS
HEIGHT: 69 IN | DIASTOLIC BLOOD PRESSURE: 74 MMHG | SYSTOLIC BLOOD PRESSURE: 144 MMHG | OXYGEN SATURATION: 98 % | HEART RATE: 62 BPM

## 2023-10-05 DIAGNOSIS — E78.00 PURE HYPERCHOLESTEROLEMIA, UNSPECIFIED: ICD-10-CM

## 2023-10-05 DIAGNOSIS — E11.628 TYPE 2 DIABETES MELLITUS WITH OTHER SKIN COMPLICATIONS: ICD-10-CM

## 2023-10-05 PROCEDURE — 99204 OFFICE O/P NEW MOD 45 MIN: CPT

## 2023-10-05 PROCEDURE — 93281 PM DEVICE PROGR EVAL MULTI: CPT

## 2023-10-05 PROCEDURE — 93000 ELECTROCARDIOGRAM COMPLETE: CPT | Mod: 59

## 2023-10-05 RX ORDER — DIVALPROEX SODIUM 250 MG/1
250 TABLET, DELAYED RELEASE ORAL DAILY
Qty: 90 | Refills: 0 | Status: DISCONTINUED | COMMUNITY
Start: 2023-08-24 | End: 2023-10-05

## 2023-10-05 RX ORDER — PRIMIDONE 50 MG/1
50 TABLET ORAL
Refills: 0 | Status: DISCONTINUED | COMMUNITY

## 2023-10-05 RX ORDER — INSULIN GLARGINE-YFGN 100 [IU]/ML
100 INJECTION, SOLUTION SUBCUTANEOUS
Refills: 0 | Status: ACTIVE | COMMUNITY

## 2023-10-05 RX ORDER — APIXABAN 5 MG/1
5 TABLET, FILM COATED ORAL
Qty: 30 | Refills: 0 | Status: ACTIVE | COMMUNITY
Start: 2023-10-05

## 2023-10-05 RX ORDER — CYANOCOBALAMIN (VITAMIN B-12) 1000 MCG
1000 TABLET ORAL
Refills: 0 | Status: ACTIVE | COMMUNITY

## 2023-10-05 RX ORDER — SERTRALINE HYDROCHLORIDE 50 MG/1
50 TABLET, FILM COATED ORAL
Refills: 0 | Status: DISCONTINUED | COMMUNITY

## 2023-10-05 RX ORDER — TAMSULOSIN HYDROCHLORIDE 0.4 MG/1
0.4 CAPSULE ORAL
Refills: 0 | Status: ACTIVE | COMMUNITY

## 2023-10-05 RX ORDER — ALBUTEROL 90 MCG
90 AEROSOL (GRAM) INHALATION
Refills: 0 | Status: ACTIVE | COMMUNITY

## 2023-10-05 RX ORDER — SENNOSIDES 8.6 MG/1
TABLET ORAL
Refills: 0 | Status: ACTIVE | COMMUNITY

## 2023-10-05 RX ORDER — METOPROLOL TARTRATE 25 MG/1
25 TABLET, FILM COATED ORAL
Refills: 0 | Status: ACTIVE | COMMUNITY

## 2023-10-05 RX ORDER — ASPIRIN 81 MG
81 TABLET, DELAYED RELEASE (ENTERIC COATED) ORAL
Refills: 0 | Status: ACTIVE | COMMUNITY

## 2023-10-05 RX ORDER — ACETAMINOPHEN 325 MG/10.15ML
325 SOLUTION ORAL
Refills: 0 | Status: DISCONTINUED | COMMUNITY

## 2023-10-05 RX ORDER — MELATONIN 3 MG
3 CAPSULE ORAL
Refills: 0 | Status: ACTIVE | COMMUNITY

## 2023-10-05 RX ORDER — ATORVASTATIN CALCIUM 80 MG/1
80 TABLET, FILM COATED ORAL
Refills: 0 | Status: ACTIVE | COMMUNITY

## 2023-10-05 RX ORDER — LEVETIRACETAM 1000 MG/1
1000 TABLET, FILM COATED ORAL
Refills: 0 | Status: DISCONTINUED | COMMUNITY

## 2023-10-05 RX ORDER — BUDESONIDE AND FORMOTEROL FUMARATE DIHYDRATE 80; 4.5 UG/1; UG/1
80-4.5 AEROSOL RESPIRATORY (INHALATION)
Refills: 0 | Status: DISCONTINUED | COMMUNITY

## 2023-10-05 RX ORDER — DIVALPROEX SODIUM 500 MG/1
500 TABLET, DELAYED RELEASE ORAL AT BEDTIME
Qty: 60 | Refills: 0 | Status: DISCONTINUED | COMMUNITY
Start: 2023-08-24 | End: 2023-10-05

## 2023-10-05 RX ORDER — APIXABAN 5 MG/1
5 TABLET, FILM COATED ORAL
Refills: 0 | Status: DISCONTINUED | COMMUNITY

## 2023-10-05 RX ORDER — VALPROIC ACID 250 MG/1
250 CAPSULE, LIQUID FILLED ORAL
Refills: 0 | Status: DISCONTINUED | COMMUNITY

## 2023-10-05 RX ORDER — POLYETHYLENE GLYCOL 3350 17 G/17G
17 POWDER, FOR SOLUTION ORAL
Refills: 0 | Status: DISCONTINUED | COMMUNITY

## 2023-11-30 ENCOUNTER — OUTPATIENT (OUTPATIENT)
Dept: OUTPATIENT SERVICES | Facility: HOSPITAL | Age: 73
LOS: 1 days | End: 2023-11-30
Payer: MEDICAID

## 2023-11-30 ENCOUNTER — APPOINTMENT (OUTPATIENT)
Dept: NEUROLOGY | Facility: HOSPITAL | Age: 73
End: 2023-11-30

## 2023-11-30 VITALS
TEMPERATURE: 98.5 F | SYSTOLIC BLOOD PRESSURE: 130 MMHG | HEIGHT: 69 IN | RESPIRATION RATE: 16 BRPM | BODY MASS INDEX: 26.12 KG/M2 | DIASTOLIC BLOOD PRESSURE: 83 MMHG | WEIGHT: 176.37 LBS | HEART RATE: 78 BPM

## 2023-11-30 DIAGNOSIS — Z95.1 PRESENCE OF AORTOCORONARY BYPASS GRAFT: Chronic | ICD-10-CM

## 2023-11-30 DIAGNOSIS — R56.9 UNSPECIFIED CONVULSIONS: ICD-10-CM

## 2023-11-30 PROCEDURE — G0463: CPT

## 2023-12-07 DIAGNOSIS — R25.1 TREMOR, UNSPECIFIED: ICD-10-CM

## 2023-12-07 DIAGNOSIS — R26.81 UNSTEADINESS ON FEET: ICD-10-CM

## 2023-12-20 ENCOUNTER — APPOINTMENT (OUTPATIENT)
Dept: PULMONOLOGY | Facility: CLINIC | Age: 73
End: 2023-12-20

## 2023-12-26 ENCOUNTER — OUTPATIENT (OUTPATIENT)
Dept: OUTPATIENT SERVICES | Facility: HOSPITAL | Age: 73
LOS: 1 days | Discharge: HOME | End: 2023-12-26
Payer: MEDICAID

## 2023-12-26 DIAGNOSIS — Z95.1 PRESENCE OF AORTOCORONARY BYPASS GRAFT: Chronic | ICD-10-CM

## 2023-12-26 DIAGNOSIS — J44.9 CHRONIC OBSTRUCTIVE PULMONARY DISEASE, UNSPECIFIED: ICD-10-CM

## 2023-12-26 PROCEDURE — 71045 X-RAY EXAM CHEST 1 VIEW: CPT | Mod: 26

## 2024-01-05 ENCOUNTER — APPOINTMENT (OUTPATIENT)
Dept: PULMONOLOGY | Facility: CLINIC | Age: 74
End: 2024-01-05
Payer: MEDICAID

## 2024-01-05 VITALS
HEIGHT: 69 IN | OXYGEN SATURATION: 99 % | BODY MASS INDEX: 27.1 KG/M2 | WEIGHT: 182.98 LBS | DIASTOLIC BLOOD PRESSURE: 82 MMHG | HEART RATE: 43 BPM | SYSTOLIC BLOOD PRESSURE: 167 MMHG

## 2024-01-05 DIAGNOSIS — J43.9 EMPHYSEMA, UNSPECIFIED: ICD-10-CM

## 2024-01-05 PROCEDURE — 99214 OFFICE O/P EST MOD 30 MIN: CPT

## 2024-01-05 NOTE — REVIEW OF SYSTEMS
[TextBox_3] : Pt unable to give ros ros as per son Is still mostly wheelchair bound Uses bpap 5 h daily has remote ho sleep apnea

## 2024-01-05 NOTE — CONSULT LETTER
[Dear  ___] : Dear  [unfilled], [Consult Letter:] : I had the pleasure of evaluating your patient, [unfilled]. [Please see my note below.] : Please see my note below. [Consult Closing:] : Thank you very much for allowing me to participate in the care of this patient.  If you have any questions, please do not hesitate to contact me. [FreeTextEntry3] : Yours truly,  Nate Crawford MD

## 2024-01-05 NOTE — REASON FOR VISIT
[Follow-Up] : a follow-up visit [COPD] : COPD [Family Member] : family member [TextBox_44] : resp failure lung nodule  [TextBox_13] :  Doesnt have PCP

## 2024-01-05 NOTE — DISCUSSION/SUMMARY
[FreeTextEntry1] :   ___________ PATIENT DATA ______________ MARQUISE TAYLOR   AGE. 73 (ON 10/1/2023)   .  1950  SEX.   M CONTACT.  [phone] PCP. DR JOSH KENDALL  REFERRING MD. DR JOSH KENDALL  INITIAL VISIT DATE . 10/3/2023  SMOKING. Nonsmoker  FAMILY HX. 10/3/2023 No signi prolems  [place]IR[place]ACE.  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 no pets  ALLERGY. nka HISTORY.  .  72 year old male with prolonged in patient course at [facility] 2022 found unresponsive had cardiac arrest rosc 5 min TTE with Takotsubo cmpthy ef 50% and TC seizures post arrest PMH of trach removed 3/7/2023  , PEG now removed, COPD, CHF, LUE dvt rxed with eliquis DM, PPM, seizure disorder, BPH was admitted to [facility] then tr to [facility] then readmitted 2023 for aspn pneumonia and resp failure He now came to see me in office to establish pulmonary outpatient followup   ___________ PROBLEM DETAILS. ___________ . HYPERCAPNIC RESP FAILURE .. 2023 2l 743/53/116 .. 2023 2p 30 732/60/75  ..  1p100% nrb 732/112/111  ..  In hospital 2023  was recommended noct bpap as he has hypercapnia and may use bpap during day  .... 1) if short of breath or .... 2)  increased wob or .... 3) increasing lethargy sec to hypercapnia  .. 10/6/2023 ABG ra 747/44/90  .. Hypercapnia improvd    . LUNG NODULE 5 mm rml nodule .. cr ch 2023 cw 2023  .... tr r and sm l effs .... mucous or secretions distal trachea  .... stable 5 mm rml nodule   . l PL EFFSN   cxr  .. cr ch 2023 cw 2023  .... tr r and sm l effs .... mucous or secretions distal trachea  .... stable 5 mm rml nodule  .... l lo lobe bectasis and cystic changes  .... lingular and l lo lobe atelectasis  .. Will hold off thorac at this point  . CXR 2023 cw 2023 .... Interval clearing of l pl effsna dn l basal infiltr  .... residual retrocardiac plaque like density is likely atelectatic or fibrotic  .... No new ac infiltr   . PNEUMONIA 2023 .. Hads leukocytosis Poss aspn pneum  .. Given  zosyn .. speech eval  .. 2023 cxr pneumonia cleared   . COPD  ..  duoneb symbicort .. 2023 Changed to solumed 40 and taper off over 5d  .. 2024 On ventolin .4p duoneb.4p Stiolto (tiotropium bromide & olodaterol)  . A fib  ..  apixaba 5.2  rate control and ac   . SSS  .. HO Bi v pacer   . CAD  .. CABG    . CHF  .. bnp 2023 bnp 3124  . 2022 echo mod decr segmental lvsf apical lateral apiccal ant segment are abn takotsubo cmpthy pasp 42 . .. 2023 lasix 40 dced .. 2023 lasix 20  . Syncope 2023 ct h (-)   . HO CARDIAC ARREST 2022 .. Needed trach now decannulated .. needed PEG now removed   . ACTION TREMORS  .. On Primidone

## 2024-01-05 NOTE — PHYSICAL EXAM
[No Acute Distress] : no acute distress [Normal Oropharynx] : normal oropharynx [Normal Appearance] : normal appearance [No Neck Mass] : no neck mass [No Murmurs] : no murmurs [No Resp Distress] : no resp distress [Clear to Auscultation Bilaterally] : clear to auscultation bilaterally [No Abnormalities] : no abnormalities [Benign] : benign [No Clubbing] : no clubbing [No Edema] : no edema [TextBox_2] : wc bound

## 2024-01-05 NOTE — ASSESSMENT
[FreeTextEntry1] :     _____ IMMUNIZATION ______ RECOMMENDATIONS. . FLU. .. Reminded to take flu vaccine q fall . COVID. .. Reminded to stay up to date with covid vaccines/boosters as they are released IMMUNIZATION DATES. . INFLUENZA . .. 10/4/2022 .. 11/5/2021  . PNEUMONIA . .. PPSV 23 1/16/2021 .. PPSV 23 6/2/2015 .. PC 13 5/21/2018 . COVID. . TDAP  .. 7/31/21 .. 6/2/2015 . SHINGLES .. 11/27/2015 ___________ HOME OXYGEN. ___________ . 1/5/2024 ra 99% . 10/3/2023 ra 99% . Does not require home oxygen   _____ OBESITY. _____ .. 1/5/2024 182 .. 10/3/2023 Unable to check weight .. 10/2/2023 78.4 k .. 1/5/2024 BMI 27  .. Not obese   ____ SMOKING. ____ .  Never smoker  ___________ HYPERCAPNIC RESP FAILURE  __________ .. 1/5/2024 10/6/2023 ABG improvd ....  (10/6/2023 ABG ra 747/44/90  ....  .. 8/29/2023 2l 743/53/116)  .. 1/5/2024 Check cbc  comprehensive  .. 1/5/2024 Continue bpap  .. 1/5/2024 May need sleep study in futture as he has ho sleep apnea   . 10/3/2023 Uses bpap  .. 10/3/2023 Recommend BPAP 18/8/2l/bur 18 .. 10/3/2023 May use bpap during day if short of breath   ________ LUNG NODULE _______ .. 1/5/2024 Check ct chest end of Feb and RTC March 2024 so we can follow pleural effsn pneumonia and lung nodule  .. 10/3/2023 Will plan ct in 6 m   ____ PLEURAL EFFSN _____ . CXR 12/26/2023 cw 8/27/2023 .... Interval clearing of l pl effsna dn l basal infiltr  .... residual retrocardiac plaque like density is likely atelectatic or fibrotic  .... No new ac infiltr  . 1/5/2024 Pneumonia resolved   .. 10/3/2023 Check CXR to do 10 d before visit   ____ COPD ____ .. 1/5/2024 Refer PFTS  .. 1/5/2024 MEDS  .... 1) Stiolto  .... 2) ventolin prn  .. 1/5/2024 gets wheezing at times .. 1/5/2024 is sensitive to perfumes  .. No ho asthma  .. No fam HO asthma  .. continue stiolto   .. 10/3/2023 Never smoker .. 10/3/2023 Has ho asthma since coming to [facility]  .. 10/3/2023 Add symbicort 160 2p bid with spacer   .. 10/3/2023 Continue albuterol prn  ____ SLEEP APNEA ____ ..1/5/2024 As per son has remote ho sleep apnea   __ DYSPHAGIA __ .. 1/5/2024 As per accompanying son has no trouble swallowing food and is taking regular diet  .. 10/3/2023 is  able to swallow regular food  ____ A fib ____ .. 10/3/2023 is on apixaban amiodarone and lopressor   __ CAD __ .. Is on dapt  ____ HFPEF ____ .. 10/3/2023 is on lasix   ____ PPM ____ .. Has pacer placed 2019   ___ TREMOR ____ .. 1/5/2024 Is seeing Dr Shwetha Myers  .. Is on primidone and levetiracetam .. 1/5/2024 is wc bound mostly    TIME SPENT .. A total of 32 minutes were spent during this patient visit with various face to face as well as non face to face activities such as data mining medical decision making placing orders explaining plan risks benefits alternatives etc

## 2024-01-08 ENCOUNTER — NON-APPOINTMENT (OUTPATIENT)
Age: 74
End: 2024-01-08

## 2024-01-08 LAB
ALBUMIN SERPL ELPH-MCNC: 3.7 G/DL
ALP BLD-CCNC: 76 U/L
ALT SERPL-CCNC: 14 U/L
ANION GAP SERPL CALC-SCNC: 9 MMOL/L
AST SERPL-CCNC: 15 U/L
BILIRUB SERPL-MCNC: 0.2 MG/DL
BUN SERPL-MCNC: 29 MG/DL
CALCIUM SERPL-MCNC: 8.5 MG/DL
CHLORIDE SERPL-SCNC: 96 MMOL/L
CO2 SERPL-SCNC: 28 MMOL/L
CREAT SERPL-MCNC: 0.97 MG/DL
EGFR: 81 ML/MIN/1.73M2
EOSINOPHIL # BLD MANUAL: 730 /UL
GLUCOSE SERPL-MCNC: 315 MG/DL
HCT VFR BLD CALC: 40.3 %
HGB BLD-MCNC: 13.3 G/DL
MCHC RBC-ENTMCNC: 30.8 PG
MCHC RBC-ENTMCNC: 33 GM/DL
MCV RBC AUTO: 93.3 FL
NT-PROBNP SERPL-MCNC: 2183 PG/ML
PLATELET # BLD AUTO: 162 K/UL
POTASSIUM SERPL-SCNC: 4.6 MMOL/L
PROT SERPL-MCNC: 6.3 G/DL
RBC # BLD: 4.32 M/UL
RBC # FLD: 14.6 %
SODIUM SERPL-SCNC: 134 MMOL/L
TOTAL IGE SMQN RAST: 32 KU/L
WBC # FLD AUTO: 7.8 K/UL

## 2024-01-10 ENCOUNTER — APPOINTMENT (OUTPATIENT)
Dept: ELECTROPHYSIOLOGY | Facility: CLINIC | Age: 74
End: 2024-01-10
Payer: MEDICAID

## 2024-01-10 ENCOUNTER — APPOINTMENT (OUTPATIENT)
Dept: CARDIOLOGY | Facility: CLINIC | Age: 74
End: 2024-01-10
Payer: MEDICAID

## 2024-01-10 ENCOUNTER — NON-APPOINTMENT (OUTPATIENT)
Age: 74
End: 2024-01-10

## 2024-01-10 DIAGNOSIS — Z95.0 PRESENCE OF CARDIAC PACEMAKER: ICD-10-CM

## 2024-01-10 DIAGNOSIS — I47.29 OTHER VENTRICULAR TACHYCARDIA: ICD-10-CM

## 2024-01-10 PROCEDURE — 93000 ELECTROCARDIOGRAM COMPLETE: CPT

## 2024-01-10 PROCEDURE — 99215 OFFICE O/P EST HI 40 MIN: CPT

## 2024-01-10 NOTE — REVIEW OF SYSTEMS
[Fever] : no fever [Headache] : no headache [Weight Gain (___ Lbs)] : no recent weight gain [Chills] : no chills [Feeling Fatigued] : not feeling fatigued [Weight Loss (___ Lbs)] : no recent weight loss [Sore Throat] : sore throat [SOB] : no shortness of breath [Dyspnea on exertion] : not dyspnea during exertion [Chest Discomfort] : no chest discomfort [Lower Ext Edema] : no extremity edema [Palpitations] : no palpitations [Orthopnea] : no orthopnea [Syncope] : no syncope [Cough] : no cough [Wheezing] : no wheezing [Nausea] : no nausea [Vomiting] : no vomiting [Dizziness] : no dizziness [Confusion] : no confusion was observed [Easy Bleeding] : no tendency for easy bleeding [Easy Bruising] : no tendency for easy bruising

## 2024-01-10 NOTE — DISCUSSION/SUMMARY
[FreeTextEntry1] : 73M w CAD/CABG 2014, HFpEF hx of cva htn hld presents for f/u  1. HFpEF -grossly euvolemic -cont gdmt for HFpEF as bp tolerates (unable to tolerate entresto) -cont bb, lasix -at next appt, will eval bp, symptoms, and consider adding entresto  2. PPM -following with device clinic at [facility], at last appt, ppm found to functioning properly.  -routine device clinic follow up  f/u 4 months >40 min spent on complete encounter [EKG obtained to assist in diagnosis and management of assessed problem(s)] : EKG obtained to assist in diagnosis and management of assessed problem(s)

## 2024-01-10 NOTE — PHYSICAL EXAM
[Well Developed] : well developed [Well Nourished] : well nourished [No Acute Distress] : no acute distress [Normal Venous Pressure] : normal venous pressure [Normal S1, S2] : normal S1, S2 [No Murmur] : no murmur [Soft] : abdomen soft [Non Tender] : non-tender [No Edema] : no edema [Moves all extremities] : moves all extremities [Alert and Oriented] : alert and oriented [de-identified] : coarse bs b/l [de-identified] : in wheelchair.

## 2024-01-10 NOTE — HISTORY OF PRESENT ILLNESS
[FreeTextEntry1] : 73M w CAD/CABG 2014, HFpEF hx of cva htn hld presents for f/u Sent in by: PMD:  Previously, pt last seen 9/23 for an initial eval.  pt with prolonged inpt course [facility] 11/2022, found unresponsive, had a cardiac arrest (per son, son found him at home minimally responsive and diaphoretic. Checked his FS which was in the 30s. Brought him to the ER where he seized and was believed to have VF arrest) with CPR and subsequent ROSC after 5 min. TTE showed Takotsubo cardiomyopathy. EF 50-55%, LVH, mild pHTN. Pt also with tonic clonic seizures post-arrest. s/p trach and PEG on 12/5, course also c/b ATN, shock liver +LUE DVT, tx w/ Eliquis, s/p multiple infections (Enterococcal faecalis cellulitis on 12/12, infected left hand hematoma status post I&D by plastics on 12/13, tracheobronchitis, sacral decubs s/p debridement. tracheostomy removed 3/7. PEG removed. pts son states they tried to introduce entresto and pt became very hypotensive and dizzy and it was stopped. pt currently lives in [facility] rehab. at last visit, pt feeling ok. euvolemic on exam. pt with PPM, sent to device clinic. Device was checked, with normal BiV PPM functioning. cont a/c.   pt now present for outpatient cards care. today,  son present for complete encounter today, pt with difficulty speaking.  pt feeling well. no sob no cp. no complaints today.  Denies palpitations, diaphoresis, syncope, LE edema, orthopnea  Exercise: physical therapy Diet: none  Prev cardiac history: see above Previous cardiac testing: Recent labs:   EKG: SR RBBB   Med hx: CAD/CABG 2014, HFpEF hx of cva htn hld Sx hx: none  Family hx: no known cardiac hx Social hx: lives in [facility] rehab. tob/etoh/drugs Meds: asa 81 lopressor 50 plavix lipitor 80 lasix 20 po glipizide metformin eliquis 5 keppra primidone valproic tamsulosin Allergies: nkda

## 2024-01-11 LAB
M TB IFN-G BLD-IMP: NEGATIVE
QUANTIFERON TB PLUS MITOGEN MINUS NIL: >10 IU/ML
QUANTIFERON TB PLUS NIL: 0.03 IU/ML
QUANTIFERON TB PLUS TB1 MINUS NIL: 0.03 IU/ML
QUANTIFERON TB PLUS TB2 MINUS NIL: 0 IU/ML

## 2024-01-11 PROCEDURE — 93294 REM INTERROG EVL PM/LDLS PM: CPT

## 2024-01-11 PROCEDURE — 93296 REM INTERROG EVL PM/IDS: CPT | Mod: NC

## 2024-01-18 ENCOUNTER — APPOINTMENT (OUTPATIENT)
Dept: NEUROLOGY | Facility: HOSPITAL | Age: 74
End: 2024-01-18
Payer: SELF-PAY

## 2024-01-18 ENCOUNTER — OUTPATIENT (OUTPATIENT)
Dept: OUTPATIENT SERVICES | Facility: HOSPITAL | Age: 74
LOS: 1 days | End: 2024-01-18
Payer: MEDICAID

## 2024-01-18 DIAGNOSIS — R56.9 UNSPECIFIED CONVULSIONS: ICD-10-CM

## 2024-01-18 DIAGNOSIS — Z95.1 PRESENCE OF AORTOCORONARY BYPASS GRAFT: Chronic | ICD-10-CM

## 2024-01-18 PROCEDURE — G0463: CPT

## 2024-01-18 PROCEDURE — 99213 OFFICE O/P EST LOW 20 MIN: CPT

## 2024-01-18 RX ORDER — PRIMIDONE 50 MG/1
50 TABLET ORAL
Qty: 180 | Refills: 3 | Status: ACTIVE | COMMUNITY
Start: 2023-08-24 | End: 1900-01-01

## 2024-01-18 RX ORDER — VALPROIC ACID 250 MG/5ML
250 SOLUTION ORAL
Qty: 3150 | Refills: 2 | Status: ACTIVE | COMMUNITY
Start: 2024-01-18 | End: 1900-01-01

## 2024-01-18 RX ORDER — LEVETIRACETAM 1000 MG/1
1000 TABLET, FILM COATED ORAL TWICE DAILY
Qty: 180 | Refills: 2 | Status: ACTIVE | COMMUNITY
Start: 2023-08-24

## 2024-01-31 DIAGNOSIS — G25.3 MYOCLONUS: ICD-10-CM

## 2024-01-31 DIAGNOSIS — G40.802 OTHER EPILEPSY, NOT INTRACTABLE, WITHOUT STATUS EPILEPTICUS: ICD-10-CM

## 2024-01-31 NOTE — PHYSICAL EXAM
[General Appearance - Alert] : alert [General Appearance - Well Nourished] : well nourished [Oriented To Time, Place, And Person] : oriented to person, place, and time [Affect] : the affect was normal [Person] : oriented to person [Place] : oriented to place [Time] : oriented to time [Short Term Intact] : short term memory intact [Cranial Nerves Optic (II)] : visual acuity intact bilaterally,  visual fields full to confrontation, pupils equal round and reactive to light [Cranial Nerves Oculomotor (III)] : extraocular motion intact [Cranial Nerves Trigeminal (V)] : facial sensation intact symmetrically [Cranial Nerves Facial (VII)] : face symmetrical [Cranial Nerves Vestibulocochlear (VIII)] : hearing was intact bilaterally [Cranial Nerves Glossopharyngeal (IX)] : tongue and palate midline [Cranial Nerves Accessory (XI - Cranial And Spinal)] : head turning and shoulder shrug symmetric [Cranial Nerves Hypoglossal (XII)] : there was no tongue deviation with protrusion [Limited Balance] : the patient's balance was impaired [Tremor] : a tremor present [Coordination - Dysmetria Impaired Finger-to-Nose Bilateral] : present bilaterally [Coordination - Dysmetria Impaired Heel-to-Shin Bilateral] : present bilaterally [2+] : Brachioradialis left 2+ [1+] : Ankle jerk left 1+ [Sclera] : the sclera and conjunctiva were normal [PERRL With Normal Accommodation] : pupils were equal in size, round, reactive to light, with normal accommodation [Outer Ear] : the ears and nose were normal in appearance [Neck Appearance] : the appearance of the neck was normal [] : no respiratory distress [Naming Objects] : difficulty naming common objects [Repeating Phrases] : difficulty repeating a phrase [Writing A Sentence] : difficulty writing a sentence [Past-pointing] : there was no past-pointing

## 2024-01-31 NOTE — ADDENDUM
[FreeTextEntry1] : Case discussed with epilepsy fellow, Dr Fox.  Mark Loo MD [facility]ile[facility] [place]

## 2024-01-31 NOTE — DISCUSSION/SUMMARY
[FreeTextEntry1] : Impression: Ongoing whole-body action tremors - most pronounced in the trunk/lower extremities. Patient with concomitant voice tremor. Current gait and balance instability is likely multifactorial (generalized weakness/deconditioning, axial/extremity tremors) in setting of cardiac arrest with seizure like activity. LUE pain and  weakness of 1 month.  Plan: 1. INCREASE primidone  [id number] to 150-100-100. Continue for 1 week. Then increase to [id number] for 1 week. Then increase to [id number] (150mg TID). 2. Patient instructed to follow up with Movement Disorders clinic in February 2024. Patient's son expressing interest in self-pay at [address] [place] [address]. 3. Continue current ASMs for history of tonic clonic seizures: LEV 1000mg BID (dose was dropped after previous visit per records), VPA 750mg / 1000mg (liquid formulations) 4. Continue ASA, statin daily. 5. Continue PT/OT at facility as able. 7. LUE EMG

## 2024-01-31 NOTE — HISTORY OF PRESENT ILLNESS
[FreeTextEntry1] : 2024:  Patient seen and examined seated in wheelchair, having difficult propping himself up due to action induced myoclonus. Patient presents with son at bedside to aid with communication due to patient's expressive aphasia. Patient is able to follow command and express understanding without issue. Patient reports no change in his myoclonus with the dose change in primidone. He additionally reports a a dull burning pain in his whole LUE with new  weakness. He reports the LUE has had some mild weakness since his cardiac arrest hospitalization in  though it did not effect his  till the last month or so. LVA level 31.3, VPA level 45. Patient denies headache, nausea, vomiting, dizziness, hearing changes, vision changes or new speech changes.  2023:  Rita Hamilton is a 73-year-old RIGHT-handed man who presents to Movement Disorders clinic with c/o tremor. Patient previously seen on 2023.  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 Previously trialed on clonazepam, which patient found too sedating. Was then started on primidone prior to his previous Neurology encounter in 2023. Was continued on primidone 100mg BID at that time. Patient's son reports that patient has not had any improvement in his tremors with primidone. However, patient is otherwise doing well. His tracheostomy and PEG tube were both reversed after hospitalization. Patient has been cleared for a solid food diet - he feeds himself. Will not have tremor when he is relaxed and feeding himself. However, if he is given a command such as moving his legs - he will experience a tremor when performing (whole body will shake). Currently living at [facility] but calls son himself each day. Mood has been stable. He was working with PT, but this has apparently been stopped for 3 months because of patient's tremors. Patient remains bedbound and requires a wheelchair for ambulation.  Patient remains with severe dysarthria. Also has a voice tremor. However, patient's son is able to understand him (patient tells me only his son can understand him). He understands the circumstances of his current medical issues. Patient does not currently use tobacco, alcohol, or recreational drugs. Previously worked as a , but has not worked in about 4 years.  Hospital DC summary: "71 yo male w/ history of COPD, CAD s/p PCI w/ stent, CABG , HF w/ PeF, 2nd degree heart block, s/p PPM, HTN, DM, CKD Stage 3, CVA- lacunar infarcts was brought in for unresponsiveness and had a cardiac arrest in the ED on . ACLS for PEA arrest and ROSC returned after 5 minutes. Cardiac arrest possibly secondary to severe hypoglycemia from eating less and not tracking blood sugar carefully. He was intubated and admitted to the ICU. ECHO showed Takotsubo cardiomyopathy. EF 50-55%, LVH, mild pHTN. Pt had tonic clonic seizures shortly post arrest, likely due to hypoglycemia vs anoxic encephalopathy. Hospital course was complicated by prolonged hypoxemic respiratory failure. Difficult extubation requiring s/p trach and PEG on , ATN & shock liver on admission, left upper extremity/left subclavian DVT on  placed on Eliquis, multiple infections (Enterococcal faecalis cellulitis on , infected left hand hematoma status post I&D by plastics on , tracheobronchitis secondary to citrobacter, stage 3-4 sacral decubiti wound s/p debridement  and fever of 102 on . Pt was weaned off tracheostomy and it was removed 3/7. His PEG feeds are now on hold and patient is on puree diet. I have spoken w/ the son and informed him that if patient continues to eat well, the PEG can be removed. The patient could not tolerate CPAP for AUDI and it was stopped on 3/7/2023. Of note, his CT chest on admission in November showed a small left lower lobe nodules, measuring up to 1 cm and I informed his son that the pt will need a CT in 2 months (6 months from November) to monitor for stability. "  Previous Radiographic/Studies:   EEG Findin23: EEG Classification / Summary: Abnormal video-EEG in the awake, drowsy, and asleep states due to: -Frequent myoclonic seizures. The patient is awake and making purposeful movements or interacting with staff during some of the seizures; other seizures occur during drowsiness or sleep; other seizures are electrographic (no clinical correlate), such as during drowsiness or sleep. -Frequent bursts and runs of central polyspike-wave discharges without clear evolution and without clinical correlate -Mild diffuse slowing  Clinical Impression: -Frequent myoclonic seizures -Frequent bursts and runs of central polyspike-wave discharges indicate risk of further myoclonic seizures from the central region -Mild diffuse cerebral dysfunction of nonspecific etiology  Findings are overall similar to the prior day. In the absence of further clinical concern, consider disconnecting study with reconnection in the future if indicated. CT brain 2023: ACC: [id number] EXAM: CT CERVICAL SPINE ORDERED BY: JONATHAN GARBER  ACC: [id number] EXAM: CT BRAIN ORDERED BY: JONATHAN GARBER  PROCEDURE DATE: 2023    INTERPRETATION: CLINICAL INFORMATION: Trauma.  COMPARISON: CT head 11/15/2022.  TECHNIQUE: Noncontrast CT scan of the head and cervical spine was performed. Thin section axial images were obtained through the cervical spine. Sagittal and coronal reformations were created.  COMPARISON: No similar prior studies available for comparison.  FINDINGS:  BRAIN: PARENCHYMA: No acute intracranial hemorrhage, mass effect, or midline shift. Old right frontal, right inferior frontal, and left posterior parietal infarcts are stable. Chronic bilateral thalamic lacunar infarcts. Extensive periventricular and subcortical white matter hypoattenuation without mass effect is noted, non-specific, but likely related to chronic small vessel ischemic changes.. Cerebral volume loss is present with proportional prominence of the sulci and ventricles. VENTRICLES: No hydrocephalus. EXTRA-AXIAL: No abnormal extraaxial collection. PARANASAL SINUSES: Patchy ethmoid air cell opacification. TYMPANOMASTOID CAVITIES: Within normal limits. ORBITS: Bilateral cataract surgery. CALVARIUM: Within normal limits.  CERVICAL SPINE: ALIGNMENT: The normal cervical lordosis is maintained. BONES: No acute fracture or prevertebral soft tissue swelling. The craniocervical junction is unremarkable. DISC LEVEL EVALUATION: Multilevel degenerative changes of the cervical spine characterized by marginal osteophyte formation, small disc bulges, and uncovertebral and facet joint arthrosis. NECK SOFT TISSUES: Partially imaged AICD leads in the left subclavian vein. Median sternotomy wire.. VISUALIZED LUNGS: Within normal limits.  IMPRESSION:  Head CT: No displaced calvarial fracture or acute intracranial hemorrhage. Stable chronic infarcts.  Cervical spine CT: No acute fracture. Cervical spondylosis.

## 2024-02-12 ENCOUNTER — APPOINTMENT (OUTPATIENT)
Dept: PULMONOLOGY | Facility: CLINIC | Age: 74
End: 2024-02-12

## 2024-02-29 ENCOUNTER — APPOINTMENT (OUTPATIENT)
Dept: NEUROLOGY | Facility: HOSPITAL | Age: 74
End: 2024-02-29

## 2024-02-29 ENCOUNTER — OUTPATIENT (OUTPATIENT)
Dept: OUTPATIENT SERVICES | Facility: HOSPITAL | Age: 74
LOS: 1 days | End: 2024-02-29
Payer: MEDICAID

## 2024-02-29 VITALS
TEMPERATURE: 98 F | BODY MASS INDEX: 28.41 KG/M2 | RESPIRATION RATE: 14 BRPM | SYSTOLIC BLOOD PRESSURE: 143 MMHG | DIASTOLIC BLOOD PRESSURE: 68 MMHG | WEIGHT: 191.8 LBS | HEART RATE: 61 BPM | HEIGHT: 69 IN

## 2024-02-29 DIAGNOSIS — Z95.1 PRESENCE OF AORTOCORONARY BYPASS GRAFT: Chronic | ICD-10-CM

## 2024-02-29 DIAGNOSIS — R56.9 UNSPECIFIED CONVULSIONS: ICD-10-CM

## 2024-02-29 PROCEDURE — G0463: CPT

## 2024-02-29 RX ORDER — EMPAGLIFLOZIN 25 MG/1
25 TABLET, FILM COATED ORAL
Refills: 0 | Status: ACTIVE | COMMUNITY
Start: 2024-02-29

## 2024-02-29 RX ORDER — OMEPRAZOLE 20 MG/1
20 CAPSULE, DELAYED RELEASE ORAL
Refills: 0 | Status: ACTIVE | COMMUNITY
Start: 2024-02-29

## 2024-02-29 RX ORDER — APIXABAN 5 MG/1
5 TABLET, FILM COATED ORAL
Refills: 0 | Status: ACTIVE | COMMUNITY
Start: 2024-02-29

## 2024-02-29 RX ORDER — PRIMIDONE 50 MG/1
50 TABLET ORAL
Qty: 630 | Refills: 0 | Status: ACTIVE | COMMUNITY
Start: 2024-02-29 | End: 1900-01-01

## 2024-02-29 NOTE — DISCUSSION/SUMMARY
[FreeTextEntry1] : 73y M w/ Hx cardiac arrest, voice tremor, lacunar infarct, epilepsy, action-induced tremor. Will try increasing primidone to maximum dose  Plan: [] increase primidone to 150+150+200, 150+200+200, 200 TID, then 200+200+250, 200+250+250 , 250 TID. Make each change every 1 week. [] c/w home aspirin and Lipitor [] c/w home Depakote and Keppra [] c/w PT/OT as able [] return to clinic in 6 weeks  Case discussed w/ attending Dr. Myers

## 2024-02-29 NOTE — HISTORY OF PRESENT ILLNESS
[FreeTextEntry1] : 24: he continues to have truncal/extremity tremors associated with actions (ex. holding arms up). Increasing the primidone has not helped. His memory is clear. He is severely dysarthric, but no word finding difficulties. No changes to medications in interim. No visual changes or headaches. Son is accompanying him today and occasionally translates to [place].   2024:  Patient seen and examined seated in wheelchair, having difficult propping himself up due to action induced myoclonus. Patient presents with son at bedside to aid with communication due to patient's expressive aphasia. Patient is able to follow command and express understanding without issue. Patient reports no change in his myoclonus with the dose change in primidone. He additionally reports a a dull burning pain in his whole LUE with new  weakness. He reports the LUE has had some mild weakness since his cardiac arrest hospitalization in  though it did not effect his  till the last month or so. LVA level 31.3, VPA level 45. Patient denies headache, nausea, vomiting, dizziness, hearing changes, vision changes or new speech changes.  2023:  Rita Hamilton is a 73-year-old RIGHT-handed man who presents to [facility] Disorders clinic with c/o tremor. Patient previously seen on 2023.  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 Previously trialed on clonazepam, which patient found too sedating. Was then started on primidone prior to his previous Neurology encounter in 2023. Was continued on primidone 100mg BID at that time. Patient's son reports that patient has not had any improvement in his tremors with primidone. However, patient is otherwise doing well. His tracheostomy and PEG tube were both reversed after hospitalization. Patient has been cleared for a solid food diet - he feeds himself. Will not have tremor when he is relaxed and feeding himself. However, if he is given a command such as moving his legs - he will experience a tremor when performing (whole body will shake). Currently living at [facility] but calls son himself each day. Mood has been stable. He was working with PT, but this has apparently been stopped for 3 months because of patient's tremors. Patient remains bedbound and requires a wheelchair for ambulation.  Patient remains with severe dysarthria. Also has a voice tremor. However, patient's son is able to understand him (patient tells me only his son can understand him). He understands the circumstances of his current medical issues. Patient does not currently use tobacco, alcohol, or recreational drugs. Previously worked as a , but has not worked in about 4 years.  Hospital DC summary: "73 yo male w/ history of COPD, CAD s/p PCI w/ stent, CABG , HF w/ PeF, 2nd degree heart block, s/p PPM, HTN, DM, CKD Stage 3, CVA- lacunar infarcts was brought in for unresponsiveness and had a cardiac arrest in the ED on . ACLS for PEA arrest and ROSC returned after 5 minutes. Cardiac arrest possibly secondary to severe hypoglycemia from eating less and not tracking blood sugar carefully. He was intubated and admitted to the ICU. ECHO showed Takotsubo cardiomyopathy. EF 50-55%, LVH, mild pHTN. Pt had tonic clonic seizures shortly post arrest, likely due to hypoglycemia vs anoxic encephalopathy. Hospital course was complicated by prolonged hypoxemic respiratory failure. Difficult extubation requiring s/p trach and PEG on , ATN & shock liver on admission, left upper extremity/left subclavian DVT on  placed on Eliquis, multiple infections (Enterococcal faecalis cellulitis on , infected left hand hematoma status post I&D by plastics on , tracheobronchitis secondary to citrobacter, stage 3-4 sacral decubiti wound s/p debridement  and fever of 102 on . Pt was weaned off tracheostomy and it was removed 3/7. His PEG feeds are now on hold and patient is on puree diet. I have spoken w/ the son and informed him that if patient continues to eat well, the PEG can be removed. The patient could not tolerate CPAP for AUDI and it was stopped on 3/7/2023. Of note, his CT chest on admission in November showed a small left lower lobe nodules, measuring up to 1 cm and I informed his son that the pt will need a CT in 2 months (6 months from November) to monitor for stability. "  Previous Radiographic/Studies: EEG Findin23: EEG Classification / Summary: Abnormal video-EEG in the awake, drowsy, and asleep states due to: -Frequent myoclonic seizures. The patient is awake and making purposeful movements or interacting with staff during some of the seizures; other seizures occur during drowsiness or sleep; other seizures are electrographic (no clinical correlate), such as during drowsiness or sleep. -Frequent bursts and runs of central polyspike-wave discharges without clear evolution and without clinical correlate -Mild diffuse slowing  Clinical Impression: -Frequent myoclonic seizures -Frequent bursts and runs of central polyspike-wave discharges indicate risk of further myoclonic seizures from the central region -Mild diffuse cerebral dysfunction of nonspecific etiology  Findings are overall similar to the prior day. In the absence of further clinical concern, consider disconnecting study with reconnection in the future if indicated. CT brain 2023: ACC: [id number] EXAM: CT CERVICAL SPINE ORDERED BY: JONATHAN GARBER  ACC: [id number] EXAM: CT BRAIN ORDERED BY: JONATHAN GARBER  PROCEDURE DATE: 2023    INTERPRETATION: CLINICAL INFORMATION: Trauma.  COMPARISON: CT head 11/15/2022.  TECHNIQUE: Noncontrast CT scan of the head and cervical spine was performed. Thin section axial images were obtained through the cervical spine. Sagittal and coronal reformations were created.  COMPARISON: No similar prior studies available for comparison.  FINDINGS:  BRAIN: PARENCHYMA: No acute intracranial hemorrhage, mass effect, or midline shift. Old right frontal, right inferior frontal, and left posterior parietal infarcts are stable. Chronic bilateral thalamic lacunar infarcts. Extensive periventricular and subcortical white matter hypoattenuation without mass effect is noted, non-specific, but likely related to chronic small vessel ischemic changes.. Cerebral volume loss is present with proportional prominence of the sulci and ventricles. VENTRICLES: No hydrocephalus. EXTRA-AXIAL: No abnormal extraaxial collection. PARANASAL SINUSES: Patchy ethmoid air cell opacification. TYMPANOMASTOID CAVITIES: Within normal limits. ORBITS: Bilateral cataract surgery. CALVARIUM: Within normal limits.  CERVICAL SPINE: ALIGNMENT: The normal cervical lordosis is maintained. BONES: No acute fracture or prevertebral soft tissue swelling. The craniocervical junction is unremarkable. DISC LEVEL EVALUATION: Multilevel degenerative changes of the cervical spine characterized by marginal osteophyte formation, small disc bulges, and uncovertebral and facet joint arthrosis. NECK SOFT TISSUES: Partially imaged AICD leads in the left subclavian vein. Median sternotomy wire.. VISUALIZED LUNGS: Within normal limits.  IMPRESSION:  Head CT: No displaced calvarial fracture or acute intracranial hemorrhage. Stable chronic infarcts.  Cervical spine CT: No acute fracture. Cervical spondylosis.

## 2024-02-29 NOTE — PHYSICAL EXAM
[General Appearance - Alert] : alert [General Appearance - In No Acute Distress] : in no acute distress [General Appearance - Well Nourished] : well nourished [General Appearance - Well Developed] : well developed [General Appearance - Well-Appearing] : healthy appearing [Oriented To Time, Place, And Person] : oriented to person, place, and time [Impaired Insight] : insight and judgment were intact [Affect] : the affect was normal [Mood] : the mood was normal [Memory Recent] : recent memory was not impaired [Memory Remote] : remote memory was not impaired [Person] : oriented to person [Place] : oriented to place [Time] : oriented to time [Short Term Intact] : short term memory intact [Remote Intact] : remote memory intact [Registration Intact] : recent registration memory intact [Span Intact] : the attention span was normal [Concentration Intact] : normal concentrating ability [Visual Intact] : visual attention was ~T not ~L decreased [Naming Objects] : no difficulty naming common objects [Fluency] : fluency intact [Comprehension] : comprehension intact [Cranial Nerves Optic (II)] : visual acuity intact bilaterally,  visual fields full to confrontation, pupils equal round and reactive to light [Cranial Nerves Oculomotor (III)] : extraocular motion intact [Cranial Nerves Facial (VII)] : face symmetrical [Cranial Nerves Trigeminal (V)] : facial sensation intact symmetrically [Cranial Nerves Vestibulocochlear (VIII)] : hearing was intact bilaterally [Cranial Nerves Accessory (XI - Cranial And Spinal)] : head turning and shoulder shrug symmetric [Cranial Nerves Glossopharyngeal (IX)] : tongue and palate midline [Cranial Nerves Hypoglossal (XII)] : there was no tongue deviation with protrusion [Motor Tone] : muscle tone was normal in all four extremities [5] : triceps 5/5 [4] : triceps 4/5 [Hand Weakness Left] : the hand  was weak [Sensation Tactile Decrease] : light touch was intact [Non-ambulatory] : Non-ambulatory [Tremor] : a tremor present [1+] : Ankle jerk left 1+ [FreeTextEntry1] : severe dysarthria and tremulous voice [Paresis Pronator Drift Left-Sided] : no pronator drift on the left [Paresis Pronator Drift Right-Sided] : no pronator drift on the right [Hand Weakness Right] : normal hand

## 2024-03-01 DIAGNOSIS — R25.1 TREMOR, UNSPECIFIED: ICD-10-CM

## 2024-03-04 ENCOUNTER — APPOINTMENT (OUTPATIENT)
Dept: PULMONOLOGY | Facility: CLINIC | Age: 74
End: 2024-03-04
Payer: MEDICAID

## 2024-03-04 PROCEDURE — 94010 BREATHING CAPACITY TEST: CPT

## 2024-03-04 PROCEDURE — 94727 GAS DIL/WSHOT DETER LNG VOL: CPT

## 2024-03-04 PROCEDURE — 94729 DIFFUSING CAPACITY: CPT

## 2024-03-05 ENCOUNTER — OUTPATIENT (OUTPATIENT)
Dept: OUTPATIENT SERVICES | Facility: HOSPITAL | Age: 74
LOS: 1 days | Discharge: HOME | End: 2024-03-05
Payer: MEDICAID

## 2024-03-05 DIAGNOSIS — Z95.1 PRESENCE OF AORTOCORONARY BYPASS GRAFT: Chronic | ICD-10-CM

## 2024-03-05 DIAGNOSIS — R52 PAIN, UNSPECIFIED: ICD-10-CM

## 2024-03-05 PROCEDURE — 71250 CT THORAX DX C-: CPT | Mod: 26

## 2024-03-15 ENCOUNTER — APPOINTMENT (OUTPATIENT)
Dept: PULMONOLOGY | Facility: CLINIC | Age: 74
End: 2024-03-15
Payer: MEDICAID

## 2024-03-15 VITALS — HEART RATE: 58 BPM | OXYGEN SATURATION: 97 % | SYSTOLIC BLOOD PRESSURE: 153 MMHG | DIASTOLIC BLOOD PRESSURE: 81 MMHG

## 2024-03-15 DIAGNOSIS — R91.1 SOLITARY PULMONARY NODULE: ICD-10-CM

## 2024-03-15 DIAGNOSIS — J45.30 MILD PERSISTENT ASTHMA, UNCOMPLICATED: ICD-10-CM

## 2024-03-15 PROCEDURE — 99214 OFFICE O/P EST MOD 30 MIN: CPT

## 2024-03-15 NOTE — DISCUSSION/SUMMARY
[FreeTextEntry1] :   ___________ PATIENT DATA ______________ MARQUISE TAYLOR   AGE. 73 (ON 10/1/2023)   .  1950  SEX.   M CONTACT.  [phone] PCP. DR JOSH KENDALL  REFERRING MD. DR JOSH KENDALL  INITIAL VISIT DATE . 10/3/2023  SMOKING. Nonsmoker  FAMILY HX. 10/3/2023 No signi prolems  BIRTHPLACE.  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 no pets  ALLERGY. nka HISTORY.  .  72 year old male with prolonged in patient course at [facility] 2022 found unresponsive had cardiac arrest rosc 5 min TTE with Takotsubo cmpthy ef 50% and TC seizures post arrest PMH of trach removed 3/7/2023  , PEG now removed, COPD, CHF, LUE dvt rxed with eliquis DM, PPM, seizure disorder, BPH was admitted to [facility] then tr to [facility] then readmitted 2023 for aspn pneumonia and resp failure He now came to see me in office to establish pulmonary outpatient followup   ___________ PROBLEM DETAILS. ___________ . hypercapnic resp failure.  . 2023 2l 743/53/116 . 2023 2p 30 732/60/75  .  1p100% nrb 732/112/111  . Recommend noct bpap as he has hypercapnia and may use bpap during day  .... 1) if short of breath or .... 2)  increased wob or .... 3) increasing lethargy sec to hypercapnia    . 5 mm rml nodule .. cr ch 2023 cw 2023  .... tr r and sm l effs .... mucous or secretions distal trachea  .... stable 5 mm rml nodule   . l Pl effsn  cxr  .. cr ch 2023 cw 2023  .... tr r and sm l effs .... mucous or secretions distal trachea  .... stable 5 mm rml nodule  .... l lo lobe bectasis and cystic changes  .... lingular and l lo lobe atelectasis  .. Will hold off thorac at this point   . Pneumonia  .. Has leukocytosis Poss aspn pneum  .. Given  zosyn .. speech eval   . COPD  duoneb symbicort .. 2023 Changed to solumed 40 and taper off over 5d   . A fib  apixaba 5.2  rate control and ac   . CAD  .. CABG    . CHF  .. bnp 2023 bnp [id number]  . 2022 echo mod decr segmental lvsf apical lateral apiccal ant segment are abn takotsubo cmpthy pasp 42 . .. 2023 lasix 40   . Syncope 2023 ct h (-)

## 2024-03-15 NOTE — ASSESSMENT
[FreeTextEntry1] :   _____ IMMUNIZATION ______ RECOMMENDATIONS. . FLU. .. Reminded to take flu vaccine q fall . COVID. .. Reminded to stay up to date with covid vaccines/boosters as they are released IMMUNIZATION DATES. . INFLUENZA . .. 10/4/2022 .. 11/5/2021  . PNEUMONIA . .. PPSV 23 1/16/2021 .. PPSV 23 6/2/2015 .. PC 13 5/21/2018 . COVID. . TDAP  .. 7/31/21 .. 6/2/2015 . SHINGLES .. 11/27/2015 ___________ HOME OXYGEN. ___________ . 3/15/2024 ra 97% . 10/3/2023 ra 99% . Does not require home oxygen   _____ OBESITY. _____ .. 3/15/2024 staff was unable to check weight  .. 10/3/2023 Unable to check weight .. 10/2/2023 78.4 k  ____ SMOKING. ____ .  Never smoker  ____ LUNG CANCER SCREENING.  ____  .  ELIGIBILITY CRITERIA  .. Never smoker so not eligible   ______                                  MEDS. ____________ . 3/15/2024  .. b12  .. Keppra 1000.2  .. duoneb .4p  .. atorvastat 80  .. ventolin hfa 2p p  .. stiolto respimat  .. lasix 20 .. metoprolol 50.2 .. asa  .. jardiance 25 .. Apixaba 5.2 .. omeprazole 20  .. primidone  .. valproic  .. insulin  ________ WORK UP _________  CBC  .. CBC 1/5/2024 W 7.8 Hb 13.3 Plt 162  SMA7 .. Na 1/8/2024 Na 134 K 4.6 CO2 28 Cr .9 G 315  LFTS  .. LFTS 1/5/2024 .. AP 76 .. AST 15 .. ALT 14  p BNP  .. PBNP 1/8/2024 2183  RIGE  .. RIGE 1/8/2024 32  AEC .. AEC 1/8/2024 730  QFT Tb .. 1/5/2024 (-)  .. FAMILY COMMUNICATION .. 1/8/2024 Called son explained re high bgl and high BNP He will dw cardio   PFTS. . PFT. 3/4/2024 .. FVC.  104 26% .. FEV1. .65 22% .. FEV1/FVC. 63% .. FEF 25 75. 19% .. TLC. 51% .. VC. 27% .. FRC N2. 66% .. ERV. 30% .. RV. 80% .. RV/TLC. 67% .. DLCO. 58% .. DL/VA . 58% . SPIROMETRY. OBSTRUCTIVE PATTERN  . LUNG VOLUMES. RESTRICTIVE LUNG DISEASE (Has HO CHF)  . DIFFUSION. MOD REDUCTION IN DIFFUSION     ___________ HYPERCAPNIC RESP FAILURE  __________ . 8/29/2023 2l 743/53/116 . 8/27/2023 2p 18/8/30 732/60/75  . 8/27/20231 1p100% nrb 732/112/111  . 10/3/2023 Uses bpap  . A/R  .. 10/3/2023 Recommend BPAP 18/8/2l/bur 18 .. 10/3/2023 May use bpap during day if short of breath  .. 3/15/2024 Continue noct BPAP  ________ LUNG NODULE _______ .. cr ch 8/29/2023 cw 1/13/2023  .... tr r and sm l effs .... mucous or secretions distal trachea  .... stable 5 mm rml nodule  .. CT chest 3/5/2024 cw ct 8/29/2023 .... mucous secretions in the trachea and  bilateral mainstem bronchi.  .... Additional bronchial impaction noted at the  lobar, segmental and subsegmental level in the lingula and left greater  than right lower lobes.  .... Decreased, now trace left pleural effusion with  improved left basilar aeration.  .... Left lower lobe cystic bronchiectasis is  otherwise unchanged.  .... Stable 6 mm medial right middle lobe nodule on  series 2, image 89 dating back to June 2020. .... Left chest wall biventricular pacemaker with right  atrial, right ventricular and coronary sinus leads present.  .... Cardiomegaly.  Status post CABG.  .... IMPRESSION: .... Decreased trace left pleural effusion with improved left basilar  aeration.  .... Unchanged left lower lobe cystic bronchiectasis. .... Airway mucous secretions with multilobar bronchial impaction,  predominantly involving the lingula and left lower lobe. .. A/R  .. 3/15/2024  Results dw pt and accompanying son  .. Has bronchiectasis which has propensity to get recurrent resp tract infections and if these become frequent then will have to consider prophylactic antibio  .. 3/15/2024 reat CT in 6-8 m  .. 10/3/2023 Will plan ct in 6 m   ____ PLEURAL EFFSN _____ .. 10/3/2023 Check CXR to do 10 d before visit  .. 12/26/2023 CXR  cw 8/2023  .... Interval clearing of left pleural effusion and left basilar infiltrate. .. 3/15/2024 Results dw pt and accompanying son   ____ COPD BRONCHIECTASIS  ____ .. 10/3/2023 Never smoker .. 10/3/2023 Has ho asthma since coming to [facility]  .. 10/3/2023 Add symbicort 160 2p bid with spacer   .. 10/3/2023 Continue albuterol prn  .. 3/15/2024 Is on stiolto  .. 3/15/2024 add symbicort 160 2p bid with spacer and rinse after use    __ DYSPHAGIA __ .. 10/3/2023 is  able to swallow regular food  ____ A fib ____ .. 10/3/2023 is on apixaban amiodarone and lopressor  .. 3/15/2024 Is no longer on amiodarpon .. 3/15/2024 Is on metoprolol  __ CAD __ .. Is on dapt  ____ HFPEF ____ .. 10/3/2023 is on lasix   ____ PPM ____ .. Has pacer placed 2019     TIME SPENT .. A total of 32 minutes were spent during this patient visit with various face to face as well as non face to face activities such as data mining medical decision making placing orders explaining plan risks benefits alternatives

## 2024-03-15 NOTE — PHYSICAL EXAM
[No Acute Distress] : no acute distress [Normal Oropharynx] : normal oropharynx [Normal Rate/Rhythm] : normal rate/rhythm [Normal Appearance] : normal appearance [No Resp Distress] : no resp distress [No Abnormalities] : no abnormalities [Benign] : benign [TextBox_2] : wc bound

## 2024-03-15 NOTE — REASON FOR VISIT
[Follow-Up] : a follow-up visit [Asthma] : asthma [Pulmonary Nodules] : pulmonary nodules [Family Member] : family member [TextBox_13] : Dr. JOSH KENDALL

## 2024-04-02 NOTE — PROGRESS NOTE ADULT - ASSESSMENT
No chief complaint on file.      Patient Active Problem List   Diagnosis    Other and unspecified hyperlipidemia    Family history of ischemic heart disease       Past Surgical History:   Procedure Laterality Date    Colonoscopy diagnostic  5/11/10    Dr. Frias, WNL/Fam Hx of Polyps, F/U 7 years    Colonoscopy remove lesions by snare  08/10/2017    Dr. 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[place]; [facility]; Graphic Design   Tobacco Use    Smoking status: Never     Passive exposure: Yes    Smokeless tobacco: Never    Tobacco comments:     wife smokes but outside   Vaping Use    Vaping Use: never used   Substance and Sexual Activity    Alcohol use: Yes     Alcohol/week: 5.0 - 10.0 standard drinks of alcohol     Types: 6 - 12 Cans of beer per week     Comment: 6 beers per week    Drug use: No    Sexual activity: Yes     Partners: Female   Other Topics Concern     Service No    Blood Transfusions No    Caffeine Concern Yes     Comment: 1 coffee/day    Occupational Exposure Yes     Comment: IT    Hobby Hazards No    Sleep Concern No    Stress Concern No    Weight Concern No    Special Diet No    Back Care Not Asked    Exercise Yes     Comment: yard work, walks with dog    Bike Helmet Not Asked    Seat Belt Not Asked    Self-Exams Not Asked   Social History Narrative    Not on file     Social Determinants of Health     Financial Resource Strain: Not on file   Food Insecurity: Not on file   Transportation Needs: Not on file   Physical Activity: Not on file   Stress: Not on file   Social Connections: Not on file   Interpersonal Safety:  Not on file       No family history on file.    No current outpatient medications on file.     No current facility-administered medications for this visit.       Eyes: Patient denies visual blurring, double vision, glaucoma, decrease vision, eye pain  ENT: Patient denies headaches, oropharyngeal problems, respiratory tract allergies  Cardiovascular: Patient denies palpitations, exertional chest pain or pressure, paroxysmal nocturnal dyspnea, shortness of breath, orthopnea, lower extremity edema, claudication  Respiratory: Patient denies cough, sputum, hemoptysis, pleuritic type chest pain, wheezing  Gastrointestinal: Patient denies nausea, vomiting, heartburn or reflux, dyspepsia, abdominal pain, constipation, diarrhea, jaundice  Genitourinary: Patient denies nocturia, dysuria, frequency, hesitancy, hematuria, incontinence  Muscoloskeletal: Patient denies joint pains  Integumentary: Patient denies rash, easy sunburn, hair changes, nail changes  Neurological: Patient denies migraine headaches, syncope, seizures, dizzyness or lightheadedness, focal neurological deficits  Psychiatric: Patient denies anxiety, depression, sleep disturbance  Endocrine: Negative history of cold intolerance, heat intolerance, polyphagia, polydipsia, polyuria  Hematologic and/or Lymphatic: Patient denies fever, night sweats, chills, easy fatiguibility, recent change in appetite, recent weight change      OBJECTIVE:  The patient appears healthy,alert,in no distress.  BUILD:  Body mass index is 27.18 kg/m².     NECK: Neck supple. No masses. No adenopathy. Thyroid symmetric, normal size,  EXTREMITIES: the extremities are normal with no signs of inflammation or injury. There is no edema.  CHEST: chest symmetric with normal A/P diameter, no deformities noted  LUNGS: clear to auscultation and percussion  HEART: PMI normal, regular rate and rhythm, S1 and S2 normal, no S3 or S4, with no gallops, murmurs or rubs and no clicks  ABDOMEN: no guarding,  ridigity or tenderness, no renal angle tenderness, no organomegaly, no masses, no free fluid, bowel sounds normal  NEUROLOGIC: skull and spine normal, CN II-XII intact, motor system- bulk, tone and power at all joints normal, sensory system- both superficial and deep normal, cerebellum normal, reflexes normal and symmetric and plantars flexor  SKIN: Negative    ASSESSMENT: Routine general medical examination at a health care facility  (primary encounter diagnosis)   Recent PHQ 2/9 Score    PHQ 2:  PHQ 2 Score Adult PHQ 2 Score Adult PHQ 2 Interpretation Little interest or pleasure in activity?   3/6/2023  10:03 AM 0 No further screening needed 0       PHQ 9:       Patient is on his feet a lot with his new job.  No history of injury but his right knee hurts.  Seems to hurt worse when he is at rest gets better with walking around no giving way no locking steps okay a little stiffness at x2 no previous injury   On exam no swelling no medial tenderness no tenderness   Valgus and varus test negative posterior and anterior drawer sign negative Thessaly test mildly positive  X-ray  Anti-inflammatory  Ice  Brace   If things do not improve he knows to come back and see me he may need an MRI       REVIEW OF SYMPTOMS      Able to give (reliable) ROS  NO     PHYSICAL EXAM    HEENT Unremarkable  atraumatic   RESP Fair air entry EXP prolonged    Harsh breath sound Resp distres mild   CARDIAC S1 S2 No S3     NO JVD    ABDOMEN SOFT BS PRESENT NOT DISTENDED No hepatosplenomegaly   PEDAL EDEMA present No calf tenderness  NO rash       GENERAL DATA .   GOC.  12/11/2022 full code       ALLGY.  nka                            WT. ..  12/2/2022 86  BMI. ..    12/2/2022 29                          ICU STAY.  .. 11/29-12/9  COVID.   .. 12/2/2022 scv2 (-)   .. 11/20/2022 scv2 (-)   BEST PRACTICE ISSUES.    HOB ELEVATN. Yes  DVT PPLX.    .. 1/3/2023 apixa 5.2 (vte)    (DVT 12/12 l Subclav throm)  ALEJO PPLX.   INFN PPLX.   .. 11/14 chlorhex 2%   SP SW ANTWAN.         DIET.    ..  12/15 glucerna 1.2 1440 gt   IV fl.    PROCEDURES.  .. 2/15/2023 pmvaslve placd   .. 1/28  size 4 fenestrated cuffed trach placed by surgery     ABGS.  1/6/2023 ac 16/450/.3/5 746/49/123     VS/ PO/IO/ VENT/ DRIPS.  3/6/2023 afeb 81 130/70     PATIENT PRESENTATION.  72 m doa 11/14/2022 cac    PMH  PMH CAD s/p PCI with stent 2015 and CABG 2014,   PMH  s/p medtronic PPM,   PMH CVA (lacunar infarct)    HOSPITAL COURSE   .. 1) PEA arrest with ROSC after approximately 5 min 11/14  Now communicative   .. 2) DVT 12/12 l Subclav thromS 12/15/2022 lvnx 80.2 1/3 changed to apixaban 5.2  .. 3) TRACH 12/5/2022 trach  .. 4) PEG12/5/2022 peg   .. 5) Cellulitis hand absc 12/13 mod enterococcus fecalis  staph epi 12/12-12/15/2022  cephalexin 12/15 vanco once  12/16-12/19 zosyn  .. 6) Vent weaning       PROBLEM ASSESSMENT RECOMMENDATIONS.  Trach 12/5   .. trach care   Trach change  ..  1/28 size 4 fenestrated cuffed trach  Trach wean.  .. 2/6/2023 pt still has lot of secretions  .. 2/27/2023 dw nurs Pt still needing lot of suctioning   .. 2/28/2023 needed lesser suctioning   .. 3/4/2023 case dw Dr Barrett Will consider decannulation 3/6 or 3/7   .. 3/4/2023 Dr Barrett ICU made aware in case he needs reintubation She will be around next week if we plan decannulation   .. 3/5/2023 dw son he wants me to try decannulation tuesday as he can be by bedside Son does understand rbaa of decannulation including risk that he may need reiuntunbation get mucus plugging etc but also realizes the risk ofpneumonia etc with trach if we do not decannulate   .. 3/5/2023 dw rt [place] Pt secretions and minimal and pt has been tolerating capping duringf day  .. 3/5/2023 will try capping overnight   .. 3/5/2023 son concerned that he has sleep apena so will order cpap 12 cm   .. 3/5/2023 pt to be kept on cardiac monitor while trach weaning being done   .. 3/6/2023 dw RT secretions minimal   .. 3/6/2023 plan decannulation in am   SLEEP APNEA.  .. 3/5/2023 cpap ordered  .. 3/6/2023 pt unable to tolerate cpap   VTE  .. On apixaba  INFECTION.  .. w 1/17-1/18-1/20-1/21-2/3-2/8-2/10-2/15-2/27/2023      w 8.3- 7.9 - 6.8 - 7.2 - 8.8  - 10- 10.8- 10.8-7.9    .. pr 1/17-1/20/2023 (-)  (-)   .. 1/16-1/20/2023 Rocephin started by hospitalist luke   CAD.  .. 11/14 asa 81   .. 12/13 metoprolol 25.2   CHF   .. Cr 2/6/2023 Cr .7   .. 11/14/2022 echo mod decr segmental lvsf apical lateral apiccal ant segment are abn takotsubo cmpthy pasp 42 .  .. Monitor for chf has chr hfref per echo   COPD   .. 2/1 duoneb p   .. 12/30 symbicort 160   .. 1/7/2023 glycopyrrolate 1.2   .. 1/9/2023 ipratropium  .. 1/15/2023 scopolamine   .. continue bd ics for copd   Anemia.  .. Hb 1/12-1/14-1/19-1/20-7/21-1/25-2/3-2/8-2/10-2/15-2/27/2023       Hb 9.8- 10 -9.1- 9.8  - 9.2 - 9.5 -10- 10 - 10.5 -10.8- 11   .. target hb 7 (+)  .. monitor   sp cac   .. good neuro recovery awake alert interactive   VTE  .. 1/3/2023 apixa 5.2 (vte)    FAMILY COMMUNICATION.  .. 2/12/2023 dw pt son  will ask speech eval for passey cordelia valve  .. 2/12/2023 Pt has a fenestrated trach and has fenestrated inner cannula which is available to place before trying passy cordelia valve     OVERALL   WEANED OFF VENT 1/23   TRACH WEANING    Will consider decannulation when secretions decrease  2/14/2023 dw speech they will try PM valve with fenestrated cannula   2/16/2023 tolerating pm valve  SLEEP APNEA  .. 3/5/2023 noct cpap if capped   PASSY CORDELIA VALVE   .. 2/15/2023 placed pmv during day time  REHAB 2/16/2023 pt eval requested   FLAILING OF LOWER EXTR   .. Jersks started 1/16   .. 1/19/2023 myoclonic jerks improvd on depakote       TIME SPENT   Over 25 minutes aggregate care time spent on encounter; activities included   direct patient care, counseling and/or coordinating care reviewing notes, lab data/ imaging , discussion with multidisciplinary team/ patient  /family and explaining in detail risks, benefits, alternatives  of the recommendations     BRANDON CAMARILLO

## 2024-04-05 ENCOUNTER — NON-APPOINTMENT (OUTPATIENT)
Age: 74
End: 2024-04-05

## 2024-04-09 ENCOUNTER — APPOINTMENT (OUTPATIENT)
Dept: NEUROLOGY | Facility: CLINIC | Age: 74
End: 2024-04-09
Payer: MEDICAID

## 2024-04-09 PROCEDURE — 95910 NRV CNDJ TEST 7-8 STUDIES: CPT

## 2024-04-09 PROCEDURE — 95886 MUSC TEST DONE W/N TEST COMP: CPT

## 2024-04-10 ENCOUNTER — APPOINTMENT (OUTPATIENT)
Dept: ELECTROPHYSIOLOGY | Facility: CLINIC | Age: 74
End: 2024-04-10
Payer: MEDICAID

## 2024-04-10 ENCOUNTER — NON-APPOINTMENT (OUTPATIENT)
Age: 74
End: 2024-04-10

## 2024-04-10 PROCEDURE — 93296 REM INTERROG EVL PM/IDS: CPT | Mod: NC

## 2024-04-10 PROCEDURE — 93294 REM INTERROG EVL PM/LDLS PM: CPT

## 2024-04-11 ENCOUNTER — APPOINTMENT (OUTPATIENT)
Dept: NEUROLOGY | Facility: HOSPITAL | Age: 74
End: 2024-04-11

## 2024-04-11 ENCOUNTER — OUTPATIENT (OUTPATIENT)
Dept: OUTPATIENT SERVICES | Facility: HOSPITAL | Age: 74
LOS: 1 days | End: 2024-04-11
Payer: MEDICAID

## 2024-04-11 VITALS
WEIGHT: 187.39 LBS | SYSTOLIC BLOOD PRESSURE: 185 MMHG | HEIGHT: 69 IN | BODY MASS INDEX: 27.76 KG/M2 | DIASTOLIC BLOOD PRESSURE: 77 MMHG | TEMPERATURE: 98 F

## 2024-04-11 DIAGNOSIS — R26.81 UNSTEADINESS ON FEET: ICD-10-CM

## 2024-04-11 DIAGNOSIS — R56.9 UNSPECIFIED CONVULSIONS: ICD-10-CM

## 2024-04-11 DIAGNOSIS — Z95.1 PRESENCE OF AORTOCORONARY BYPASS GRAFT: Chronic | ICD-10-CM

## 2024-04-11 DIAGNOSIS — M79.602 PAIN IN LEFT ARM: ICD-10-CM

## 2024-04-11 DIAGNOSIS — R20.0 PAIN IN LEFT ARM: ICD-10-CM

## 2024-04-11 PROCEDURE — G0463: CPT

## 2024-04-12 PROBLEM — M79.602 PAIN AND NUMBNESS OF LEFT UPPER EXTREMITY: Status: ACTIVE | Noted: 2024-04-09

## 2024-04-12 PROBLEM — R26.81 GAIT INSTABILITY: Status: ACTIVE | Noted: 2023-07-12

## 2024-04-12 NOTE — DISCUSSION/SUMMARY
[FreeTextEntry1] : 73y M w/ Hx cardiac arrest, voice tremor, lacunar infarct, epilepsy, action-induced tremor. Tremor in voice and body improved with primidone though unable to tolerate dose > 600 mg/day due to lethargy. LUE pain with EMG wnl, pain possible 2/2 contractions & exacerbated with abduction  Plan: [] Continue Primadone 200 TID [] c/w home aspirin and Lipitor [] c/w home Depakote and Keppra [] c/w PT/OT as able [] return to clinic to see movement disorder specialist at next availibility  Case discussed w/ attending Dr. Irvin.

## 2024-04-12 NOTE — REVIEW OF SYSTEMS
[Arm Weakness] : arm weakness [Abnormal Sensation] : an abnormal sensation [Negative] : Gastrointestinal [de-identified] : Pain of LUE on abduction Lethargy with primadone

## 2024-04-12 NOTE — PHYSICAL EXAM
[General Appearance - Alert] : alert [General Appearance - In No Acute Distress] : in no acute distress [Oriented To Time, Place, And Person] : oriented to person, place, and time [Affect] : the affect was normal [Person] : oriented to person [Place] : oriented to place [Time] : oriented to time [Writing A Sentence] : no difficulty writing a sentence [Cranial Nerves Optic (II)] : visual acuity intact bilaterally,  visual fields full to confrontation, pupils equal round and reactive to light [Cranial Nerves Oculomotor (III)] : extraocular motion intact [Cranial Nerves Trigeminal (V)] : facial sensation intact symmetrically [Cranial Nerves Facial (VII)] : face symmetrical [Cranial Nerves Vestibulocochlear (VIII)] : hearing was intact bilaterally [Cranial Nerves Accessory (XI - Cranial And Spinal)] : head turning and shoulder shrug symmetric [Cranial Nerves Hypoglossal (XII)] : there was no tongue deviation with protrusion [+4] : arm extension  +4/5 [Motor Strength Hips Right Weakness] : hip weakness was present [Motor Strength Hips Left Weakness] : hip weakness was present [3] : knee extension 3/5 [-4] : ankle dorsiflexion -4/5 [5] : ankle plantar flexion 5/5 [Non-ambulatory] : Non-ambulatory [1+] : Ankle jerk left 1+ [Naming Objects] : difficulty naming common objects [Repeating Phrases] : difficulty repeating a phrase [FreeTextEntry6] : Increased tone in L shoulder & finger fllexors/extensors

## 2024-04-12 NOTE — HISTORY OF PRESENT ILLNESS
[FreeTextEntry1] : 2024: Patient had been increasing his primidone dose though began to feel significant lethargy at dosages 650/ day and above. He feels that these doses helped significantly with his tremors and voice though the lethargy caused him to sleep most of the day.   24: he continues to have truncal/extremity tremors associated with actions (ex. holding arms up). Increasing the primidone has not helped. His memory is clear. He is severely dysarthric, but no word finding difficulties. No changes to medications in interim. No visual changes or headaches. Son is accompanying him today and occasionally translates to [place].   2024:  Patient seen and examined seated in wheelchair, having difficult propping himself up due to action induced myoclonus. Patient presents with son at bedside to aid with communication due to patient's expressive aphasia. Patient is able to follow command and express understanding without issue. Patient reports no change in his myoclonus with the dose change in primidone. He additionally reports a a dull burning pain in his whole LUE with new  weakness. He reports the LUE has had some mild weakness since his cardiac arrest hospitalization in  though it did not effect his  till the last month or so. LVA level 31.3, VPA level 45. Patient denies headache, nausea, vomiting, dizziness, hearing changes, vision changes or new speech changes.  2023:  Rita Hamilton is a 73-year-old RIGHT-handed man who presents to Movement Disorders clinic with c/o tremor. Patient previously seen on 2023.  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 Previously trialed on clonazepam, which patient found too sedating. Was then started on primidone prior to his previous Neurology encounter in 2023. Was continued on primidone 100mg BID at that time. Patient's son reports that patient has not had any improvement in his tremors with primidone. However, patient is otherwise doing well. His tracheostomy and PEG tube were both reversed after hospitalization. Patient has been cleared for a solid food diet - he feeds himself. Will not have tremor when he is relaxed and feeding himself. However, if he is given a command such as moving his legs - he will experience a tremor when performing (whole body will shake). Currently living at [facility] but calls son himself each day. Mood has been stable. He was working with PT, but this has apparently been stopped for 3 months because of patient's tremors. Patient remains bedbound and requires a wheelchair for ambulation.  Patient remains with severe dysarthria. Also has a voice tremor. However, patient's son is able to understand him (patient tells me only his son can understand him). He understands the circumstances of his current medical issues. Patient does not currently use tobacco, alcohol, or recreational drugs. Previously worked as a , but has not worked in about 4 years.  Hospital DC summary: "71 yo male w/ history of COPD, CAD s/p PCI w/ stent, CABG , HF w/ PeF, 2nd degree heart block, s/p PPM, HTN, DM, CKD Stage 3, CVA- lacunar infarcts was brought in for unresponsiveness and had a cardiac arrest in the ED on . ACLS for PEA arrest and ROSC returned after 5 minutes. Cardiac arrest possibly secondary to severe hypoglycemia from eating less and not tracking blood sugar carefully. He was intubated and admitted to the ICU. ECHO showed Takotsubo cardiomyopathy. EF 50-55%, LVH, mild pHTN. Pt had tonic clonic seizures shortly post arrest, likely due to hypoglycemia vs anoxic encephalopathy. Hospital course was complicated by prolonged hypoxemic respiratory failure. Difficult extubation requiring s/p trach and PEG on , ATN & shock liver on admission, left upper extremity/left subclavian DVT on  placed on Eliquis, multiple infections (Enterococcal faecalis cellulitis on , infected left hand hematoma status post I&D by plastics on , tracheobronchitis secondary to citrobacter, stage 3-4 sacral decubiti wound s/p debridement  and fever of 102 on . Pt was weaned off tracheostomy and it was removed 3/7. His PEG feeds are now on hold and patient is on puree diet. I have spoken w/ the son and informed him that if patient continues to eat well, the PEG can be removed. The patient could not tolerate CPAP for AUDI and it was stopped on 3/7/2023. Of note, his CT chest on admission in November showed a small left lower lobe nodules, measuring up to 1 cm and I informed his son that the pt will need a CT in 2 months (6 months from November) to monitor for stability. "  Previous Radiographic/Studies: EEG Findin23: EEG Classification / Summary: Abnormal video-EEG in the awake, drowsy, and asleep states due to: -Frequent myoclonic seizures. The patient is awake and making purposeful movements or interacting with staff during some of the seizures; other seizures occur during drowsiness or sleep; other seizures are electrographic (no clinical correlate), such as during drowsiness or sleep. -Frequent bursts and runs of central polyspike-wave discharges without clear evolution and without clinical correlate -Mild diffuse slowing  Clinical Impression: -Frequent myoclonic seizures -Frequent bursts and runs of central polyspike-wave discharges indicate risk of further myoclonic seizures from the central region -Mild diffuse cerebral dysfunction of nonspecific etiology  Findings are overall similar to the prior day. In the absence of further clinical concern, consider disconnecting study with reconnection in the future if indicated. CT brain 2023: ACC: [id number] EXAM: CT CERVICAL SPINE ORDERED BY: JONATHAN GARBER  ACC: [id number] EXAM: CT BRAIN ORDERED BY: JONATHAN GARBER  PROCEDURE DATE: 2023    INTERPRETATION: CLINICAL INFORMATION: Trauma.  COMPARISON: CT head 11/15/2022.  TECHNIQUE: Noncontrast CT scan of the head and cervical spine was performed. Thin section axial images were obtained through the cervical spine. Sagittal and coronal reformations were created.  COMPARISON: No similar prior studies available for comparison.  FINDINGS:  BRAIN: PARENCHYMA: No acute intracranial hemorrhage, mass effect, or midline shift. Old right frontal, right inferior frontal, and left posterior parietal infarcts are stable. Chronic bilateral thalamic lacunar infarcts. Extensive periventricular and subcortical white matter hypoattenuation without mass effect is noted, non-specific, but likely related to chronic small vessel ischemic changes.. Cerebral volume loss is present with proportional prominence of the sulci and ventricles. VENTRICLES: No hydrocephalus. EXTRA-AXIAL: No abnormal extraaxial collection. PARANASAL SINUSES: Patchy ethmoid air cell opacification. TYMPANOMASTOID CAVITIES: Within normal limits. ORBITS: Bilateral cataract surgery. CALVARIUM: Within normal limits.  CERVICAL SPINE: ALIGNMENT: The normal cervical lordosis is maintained. BONES: No acute fracture or prevertebral soft tissue swelling. The craniocervical junction is unremarkable. DISC LEVEL EVALUATION: Multilevel degenerative changes of the cervical spine characterized by marginal osteophyte formation, small disc bulges, and uncovertebral and facet joint arthrosis. NECK SOFT TISSUES: Partially imaged AICD leads in the left subclavian vein. Median sternotomy wire.. VISUALIZED LUNGS: Within normal limits.  IMPRESSION:  Head CT: No displaced calvarial fracture or acute intracranial hemorrhage. Stable chronic infarcts.  Cervical spine CT: No acute fracture. Cervical spondylosis.

## 2024-04-14 ENCOUNTER — INPATIENT (INPATIENT)
Facility: HOSPITAL | Age: 74
LOS: 17 days | Discharge: NURSING, REHAB OR LONG-TERM CARE | End: 2024-05-02
Attending: INTERNAL MEDICINE | Admitting: INTERNAL MEDICINE
Payer: MEDICAID

## 2024-04-14 VITALS
OXYGEN SATURATION: 98 % | RESPIRATION RATE: 18 BRPM | TEMPERATURE: 101 F | WEIGHT: 187.61 LBS | HEIGHT: 67 IN | HEART RATE: 83 BPM | DIASTOLIC BLOOD PRESSURE: 83 MMHG | SYSTOLIC BLOOD PRESSURE: 146 MMHG

## 2024-04-14 DIAGNOSIS — E87.5 HYPERKALEMIA: ICD-10-CM

## 2024-04-14 DIAGNOSIS — E11.9 TYPE 2 DIABETES MELLITUS WITHOUT COMPLICATIONS: ICD-10-CM

## 2024-04-14 DIAGNOSIS — R56.9 UNSPECIFIED CONVULSIONS: ICD-10-CM

## 2024-04-14 DIAGNOSIS — Z95.1 PRESENCE OF AORTOCORONARY BYPASS GRAFT: Chronic | ICD-10-CM

## 2024-04-14 DIAGNOSIS — J69.0 PNEUMONITIS DUE TO INHALATION OF FOOD AND VOMIT: ICD-10-CM

## 2024-04-14 DIAGNOSIS — I10 ESSENTIAL (PRIMARY) HYPERTENSION: ICD-10-CM

## 2024-04-14 DIAGNOSIS — R79.89 OTHER SPECIFIED ABNORMAL FINDINGS OF BLOOD CHEMISTRY: ICD-10-CM

## 2024-04-14 LAB
ALBUMIN SERPL ELPH-MCNC: 2.4 G/DL — LOW (ref 3.3–5)
ALBUMIN SERPL ELPH-MCNC: 2.7 G/DL — LOW (ref 3.3–5)
ALP SERPL-CCNC: 56 U/L — SIGNIFICANT CHANGE UP (ref 40–120)
ALP SERPL-CCNC: 67 U/L — SIGNIFICANT CHANGE UP (ref 40–120)
ALT FLD-CCNC: 17 U/L — SIGNIFICANT CHANGE UP (ref 12–78)
ALT FLD-CCNC: 25 U/L — SIGNIFICANT CHANGE UP (ref 12–78)
ANION GAP SERPL CALC-SCNC: 14 MMOL/L — SIGNIFICANT CHANGE UP (ref 5–17)
ANION GAP SERPL CALC-SCNC: 14 MMOL/L — SIGNIFICANT CHANGE UP (ref 5–17)
APPEARANCE UR: CLEAR — SIGNIFICANT CHANGE UP
AST SERPL-CCNC: 17 U/L — SIGNIFICANT CHANGE UP (ref 15–37)
AST SERPL-CCNC: 72 U/L — HIGH (ref 15–37)
BACTERIA # UR AUTO: ABNORMAL /HPF
BASOPHILS # BLD AUTO: 0.1 K/UL — SIGNIFICANT CHANGE UP (ref 0–0.2)
BASOPHILS NFR BLD AUTO: 0.7 % — SIGNIFICANT CHANGE UP (ref 0–2)
BILIRUB SERPL-MCNC: 0.6 MG/DL — SIGNIFICANT CHANGE UP (ref 0.2–1.2)
BILIRUB SERPL-MCNC: 0.6 MG/DL — SIGNIFICANT CHANGE UP (ref 0.2–1.2)
BILIRUB UR-MCNC: NEGATIVE — SIGNIFICANT CHANGE UP
BUN SERPL-MCNC: 30 MG/DL — HIGH (ref 7–23)
BUN SERPL-MCNC: 35 MG/DL — HIGH (ref 7–23)
CALCIUM SERPL-MCNC: 8.6 MG/DL — SIGNIFICANT CHANGE UP (ref 8.5–10.1)
CALCIUM SERPL-MCNC: 9 MG/DL — SIGNIFICANT CHANGE UP (ref 8.5–10.1)
CHLORIDE SERPL-SCNC: 101 MMOL/L — SIGNIFICANT CHANGE UP (ref 96–108)
CHLORIDE SERPL-SCNC: 103 MMOL/L — SIGNIFICANT CHANGE UP (ref 96–108)
CO2 SERPL-SCNC: 21 MMOL/L — LOW (ref 22–31)
CO2 SERPL-SCNC: 21 MMOL/L — LOW (ref 22–31)
COLOR SPEC: YELLOW — SIGNIFICANT CHANGE UP
CREAT SERPL-MCNC: 1 MG/DL — SIGNIFICANT CHANGE UP (ref 0.5–1.3)
CREAT SERPL-MCNC: 1.27 MG/DL — SIGNIFICANT CHANGE UP (ref 0.5–1.3)
DIFF PNL FLD: NEGATIVE — SIGNIFICANT CHANGE UP
EGFR: 59 ML/MIN/1.73M2 — LOW
EGFR: 78 ML/MIN/1.73M2 — SIGNIFICANT CHANGE UP
EOSINOPHIL # BLD AUTO: 0.05 K/UL — SIGNIFICANT CHANGE UP (ref 0–0.5)
EOSINOPHIL NFR BLD AUTO: 0.3 % — SIGNIFICANT CHANGE UP (ref 0–6)
EPI CELLS # UR: PRESENT
FLUAV AG NPH QL: SIGNIFICANT CHANGE UP
FLUBV AG NPH QL: SIGNIFICANT CHANGE UP
GLUCOSE BLDC GLUCOMTR-MCNC: 213 MG/DL — HIGH (ref 70–99)
GLUCOSE BLDC GLUCOMTR-MCNC: 247 MG/DL — HIGH (ref 70–99)
GLUCOSE BLDC GLUCOMTR-MCNC: 275 MG/DL — HIGH (ref 70–99)
GLUCOSE BLDC GLUCOMTR-MCNC: 293 MG/DL — HIGH (ref 70–99)
GLUCOSE SERPL-MCNC: 173 MG/DL — HIGH (ref 70–99)
GLUCOSE SERPL-MCNC: 265 MG/DL — HIGH (ref 70–99)
GLUCOSE UR QL: >=1000 MG/DL
HCT VFR BLD CALC: 44.5 % — SIGNIFICANT CHANGE UP (ref 39–50)
HCT VFR BLD CALC: 50.3 % — HIGH (ref 39–50)
HGB BLD-MCNC: 14.8 G/DL — SIGNIFICANT CHANGE UP (ref 13–17)
HGB BLD-MCNC: 16.5 G/DL — SIGNIFICANT CHANGE UP (ref 13–17)
IMM GRANULOCYTES NFR BLD AUTO: 0.4 % — SIGNIFICANT CHANGE UP (ref 0–0.9)
KETONES UR-MCNC: 40 MG/DL
LEUKOCYTE ESTERASE UR-ACNC: NEGATIVE — SIGNIFICANT CHANGE UP
LIDOCAIN IGE QN: 10 U/L — LOW (ref 13–75)
LYMPHOCYTES # BLD AUTO: 1.71 K/UL — SIGNIFICANT CHANGE UP (ref 1–3.3)
LYMPHOCYTES # BLD AUTO: 11.2 % — LOW (ref 13–44)
MCHC RBC-ENTMCNC: 30.3 PG — SIGNIFICANT CHANGE UP (ref 27–34)
MCHC RBC-ENTMCNC: 30.4 PG — SIGNIFICANT CHANGE UP (ref 27–34)
MCHC RBC-ENTMCNC: 32.8 G/DL — SIGNIFICANT CHANGE UP (ref 32–36)
MCHC RBC-ENTMCNC: 33.3 G/DL — SIGNIFICANT CHANGE UP (ref 32–36)
MCV RBC AUTO: 91.2 FL — SIGNIFICANT CHANGE UP (ref 80–100)
MCV RBC AUTO: 92.8 FL — SIGNIFICANT CHANGE UP (ref 80–100)
MONOCYTES # BLD AUTO: 2.17 K/UL — HIGH (ref 0–0.9)
MONOCYTES NFR BLD AUTO: 14.2 % — HIGH (ref 2–14)
NEUTROPHILS # BLD AUTO: 11.21 K/UL — HIGH (ref 1.8–7.4)
NEUTROPHILS NFR BLD AUTO: 73.2 % — SIGNIFICANT CHANGE UP (ref 43–77)
NITRITE UR-MCNC: NEGATIVE — SIGNIFICANT CHANGE UP
NRBC # BLD: 0 /100 WBCS — SIGNIFICANT CHANGE UP (ref 0–0)
NRBC # BLD: 0 /100 WBCS — SIGNIFICANT CHANGE UP (ref 0–0)
PH UR: 5.5 — SIGNIFICANT CHANGE UP (ref 5–8)
PLATELET # BLD AUTO: 190 K/UL — SIGNIFICANT CHANGE UP (ref 150–400)
PLATELET # BLD AUTO: 193 K/UL — SIGNIFICANT CHANGE UP (ref 150–400)
POTASSIUM SERPL-MCNC: 3.6 MMOL/L — SIGNIFICANT CHANGE UP (ref 3.5–5.3)
POTASSIUM SERPL-MCNC: 5.9 MMOL/L — HIGH (ref 3.5–5.3)
POTASSIUM SERPL-SCNC: 3.6 MMOL/L — SIGNIFICANT CHANGE UP (ref 3.5–5.3)
POTASSIUM SERPL-SCNC: 5.9 MMOL/L — HIGH (ref 3.5–5.3)
PROT SERPL-MCNC: 6.5 GM/DL — SIGNIFICANT CHANGE UP (ref 6–8.3)
PROT SERPL-MCNC: 7.6 GM/DL — SIGNIFICANT CHANGE UP (ref 6–8.3)
PROT UR-MCNC: 100 MG/DL
RBC # BLD: 4.88 M/UL — SIGNIFICANT CHANGE UP (ref 4.2–5.8)
RBC # BLD: 5.42 M/UL — SIGNIFICANT CHANGE UP (ref 4.2–5.8)
RBC # FLD: 15 % — HIGH (ref 10.3–14.5)
RBC # FLD: 15 % — HIGH (ref 10.3–14.5)
RBC CASTS # UR COMP ASSIST: 2 /HPF — SIGNIFICANT CHANGE UP (ref 0–4)
SARS-COV-2 RNA SPEC QL NAA+PROBE: SIGNIFICANT CHANGE UP
SODIUM SERPL-SCNC: 136 MMOL/L — SIGNIFICANT CHANGE UP (ref 135–145)
SODIUM SERPL-SCNC: 138 MMOL/L — SIGNIFICANT CHANGE UP (ref 135–145)
SP GR SPEC: >1.03 — HIGH (ref 1–1.03)
TROPONIN I, HIGH SENSITIVITY RESULT: 245.8 NG/L — HIGH
TROPONIN I, HIGH SENSITIVITY RESULT: 277.1 NG/L — HIGH
UROBILINOGEN FLD QL: 1 MG/DL — SIGNIFICANT CHANGE UP (ref 0.2–1)
WBC # BLD: 14.36 K/UL — HIGH (ref 3.8–10.5)
WBC # BLD: 15.3 K/UL — HIGH (ref 3.8–10.5)
WBC # FLD AUTO: 14.36 K/UL — HIGH (ref 3.8–10.5)
WBC # FLD AUTO: 15.3 K/UL — HIGH (ref 3.8–10.5)
WBC UR QL: 6 /HPF — HIGH (ref 0–5)

## 2024-04-14 PROCEDURE — 99285 EMERGENCY DEPT VISIT HI MDM: CPT

## 2024-04-14 PROCEDURE — 99223 1ST HOSP IP/OBS HIGH 75: CPT

## 2024-04-14 PROCEDURE — 71045 X-RAY EXAM CHEST 1 VIEW: CPT | Mod: 26

## 2024-04-14 PROCEDURE — 93010 ELECTROCARDIOGRAM REPORT: CPT

## 2024-04-14 RX ORDER — ASPIRIN/CALCIUM CARB/MAGNESIUM 324 MG
162 TABLET ORAL ONCE
Refills: 0 | Status: COMPLETED | OUTPATIENT
Start: 2024-04-14 | End: 2024-04-14

## 2024-04-14 RX ORDER — LEVETIRACETAM 250 MG/1
1000 TABLET, FILM COATED ORAL
Refills: 0 | Status: DISCONTINUED | OUTPATIENT
Start: 2024-04-14 | End: 2024-05-02

## 2024-04-14 RX ORDER — TAMSULOSIN HYDROCHLORIDE 0.4 MG/1
0.4 CAPSULE ORAL AT BEDTIME
Refills: 0 | Status: DISCONTINUED | OUTPATIENT
Start: 2024-04-14 | End: 2024-04-22

## 2024-04-14 RX ORDER — DEXTROSE 50 % IN WATER 50 %
50 SYRINGE (ML) INTRAVENOUS ONCE
Refills: 0 | Status: COMPLETED | OUTPATIENT
Start: 2024-04-14 | End: 2024-04-14

## 2024-04-14 RX ORDER — ATORVASTATIN CALCIUM 80 MG/1
80 TABLET, FILM COATED ORAL AT BEDTIME
Refills: 0 | Status: DISCONTINUED | OUTPATIENT
Start: 2024-04-14 | End: 2024-05-02

## 2024-04-14 RX ORDER — PIPERACILLIN AND TAZOBACTAM 4; .5 G/20ML; G/20ML
3.38 INJECTION, POWDER, LYOPHILIZED, FOR SOLUTION INTRAVENOUS EVERY 8 HOURS
Refills: 0 | Status: COMPLETED | OUTPATIENT
Start: 2024-04-14 | End: 2024-04-21

## 2024-04-14 RX ORDER — SODIUM CHLORIDE 9 MG/ML
1000 INJECTION INTRAMUSCULAR; INTRAVENOUS; SUBCUTANEOUS
Refills: 0 | Status: DISCONTINUED | OUTPATIENT
Start: 2024-04-14 | End: 2024-04-16

## 2024-04-14 RX ORDER — ALBUTEROL 90 UG/1
2 AEROSOL, METERED ORAL EVERY 6 HOURS
Refills: 0 | Status: DISCONTINUED | OUTPATIENT
Start: 2024-04-14 | End: 2024-05-02

## 2024-04-14 RX ORDER — APIXABAN 2.5 MG/1
5 TABLET, FILM COATED ORAL EVERY 12 HOURS
Refills: 0 | Status: DISCONTINUED | OUTPATIENT
Start: 2024-04-14 | End: 2024-05-02

## 2024-04-14 RX ORDER — ASPIRIN/CALCIUM CARB/MAGNESIUM 324 MG
81 TABLET ORAL DAILY
Refills: 0 | Status: DISCONTINUED | OUTPATIENT
Start: 2024-04-14 | End: 2024-04-22

## 2024-04-14 RX ORDER — DEXTROSE 50 % IN WATER 50 %
15 SYRINGE (ML) INTRAVENOUS ONCE
Refills: 0 | Status: DISCONTINUED | OUTPATIENT
Start: 2024-04-14 | End: 2024-04-16

## 2024-04-14 RX ORDER — FUROSEMIDE 40 MG
20 TABLET ORAL AT BEDTIME
Refills: 0 | Status: DISCONTINUED | OUTPATIENT
Start: 2024-04-14 | End: 2024-04-14

## 2024-04-14 RX ORDER — SODIUM CHLORIDE 9 MG/ML
1000 INJECTION, SOLUTION INTRAVENOUS
Refills: 0 | Status: DISCONTINUED | OUTPATIENT
Start: 2024-04-14 | End: 2024-04-16

## 2024-04-14 RX ORDER — SODIUM POLYSTYRENE SULFONATE 4.1 MEQ/G
30 POWDER, FOR SUSPENSION ORAL ONCE
Refills: 0 | Status: COMPLETED | OUTPATIENT
Start: 2024-04-14 | End: 2024-04-14

## 2024-04-14 RX ORDER — GLUCAGON INJECTION, SOLUTION 0.5 MG/.1ML
1 INJECTION, SOLUTION SUBCUTANEOUS ONCE
Refills: 0 | Status: DISCONTINUED | OUTPATIENT
Start: 2024-04-14 | End: 2024-04-16

## 2024-04-14 RX ORDER — PRIMIDONE 250 MG/1
100 TABLET ORAL AT BEDTIME
Refills: 0 | Status: DISCONTINUED | OUTPATIENT
Start: 2024-04-14 | End: 2024-04-14

## 2024-04-14 RX ORDER — METOPROLOL TARTRATE 50 MG
25 TABLET ORAL
Refills: 0 | Status: DISCONTINUED | OUTPATIENT
Start: 2024-04-14 | End: 2024-05-02

## 2024-04-14 RX ORDER — VALPROIC ACID (AS SODIUM SALT) 250 MG/5ML
750 SOLUTION, ORAL ORAL DAILY
Refills: 0 | Status: DISCONTINUED | OUTPATIENT
Start: 2024-04-14 | End: 2024-04-19

## 2024-04-14 RX ORDER — PIPERACILLIN AND TAZOBACTAM 4; .5 G/20ML; G/20ML
3.38 INJECTION, POWDER, LYOPHILIZED, FOR SOLUTION INTRAVENOUS ONCE
Refills: 0 | Status: COMPLETED | OUTPATIENT
Start: 2024-04-14 | End: 2024-04-14

## 2024-04-14 RX ORDER — ALBUTEROL 90 UG/1
10 AEROSOL, METERED ORAL ONCE
Refills: 0 | Status: COMPLETED | OUTPATIENT
Start: 2024-04-14 | End: 2024-04-14

## 2024-04-14 RX ORDER — BUDESONIDE AND FORMOTEROL FUMARATE DIHYDRATE 160; 4.5 UG/1; UG/1
2 AEROSOL RESPIRATORY (INHALATION)
Refills: 0 | Status: DISCONTINUED | OUTPATIENT
Start: 2024-04-14 | End: 2024-04-16

## 2024-04-14 RX ORDER — DEXTROSE 50 % IN WATER 50 %
25 SYRINGE (ML) INTRAVENOUS ONCE
Refills: 0 | Status: DISCONTINUED | OUTPATIENT
Start: 2024-04-14 | End: 2024-04-16

## 2024-04-14 RX ORDER — ONDANSETRON 8 MG/1
4 TABLET, FILM COATED ORAL EVERY 8 HOURS
Refills: 0 | Status: DISCONTINUED | OUTPATIENT
Start: 2024-04-14 | End: 2024-04-30

## 2024-04-14 RX ORDER — FUROSEMIDE 40 MG
1 TABLET ORAL
Qty: 0 | Refills: 0 | DISCHARGE

## 2024-04-14 RX ORDER — INSULIN HUMAN 100 [IU]/ML
5 INJECTION, SOLUTION SUBCUTANEOUS ONCE
Refills: 0 | Status: COMPLETED | OUTPATIENT
Start: 2024-04-14 | End: 2024-04-14

## 2024-04-14 RX ORDER — ACETAMINOPHEN 500 MG
650 TABLET ORAL EVERY 6 HOURS
Refills: 0 | Status: DISCONTINUED | OUTPATIENT
Start: 2024-04-14 | End: 2024-05-02

## 2024-04-14 RX ORDER — DEXTROSE 10 % IN WATER 10 %
125 INTRAVENOUS SOLUTION INTRAVENOUS ONCE
Refills: 0 | Status: DISCONTINUED | OUTPATIENT
Start: 2024-04-14 | End: 2024-04-16

## 2024-04-14 RX ORDER — INSULIN LISPRO 100/ML
VIAL (ML) SUBCUTANEOUS
Refills: 0 | Status: DISCONTINUED | OUTPATIENT
Start: 2024-04-14 | End: 2024-04-16

## 2024-04-14 RX ORDER — LANOLIN ALCOHOL/MO/W.PET/CERES
3 CREAM (GRAM) TOPICAL AT BEDTIME
Refills: 0 | Status: DISCONTINUED | OUTPATIENT
Start: 2024-04-14 | End: 2024-04-16

## 2024-04-14 RX ORDER — ACETAMINOPHEN 500 MG
650 TABLET ORAL ONCE
Refills: 0 | Status: COMPLETED | OUTPATIENT
Start: 2024-04-14 | End: 2024-04-14

## 2024-04-14 RX ADMIN — PIPERACILLIN AND TAZOBACTAM 25 GRAM(S): 4; .5 INJECTION, POWDER, LYOPHILIZED, FOR SOLUTION INTRAVENOUS at 09:04

## 2024-04-14 RX ADMIN — Medication 50 MILLILITER(S): at 04:21

## 2024-04-14 RX ADMIN — SODIUM CHLORIDE 80 MILLILITER(S): 9 INJECTION INTRAMUSCULAR; INTRAVENOUS; SUBCUTANEOUS at 07:40

## 2024-04-14 RX ADMIN — ATORVASTATIN CALCIUM 80 MILLIGRAM(S): 80 TABLET, FILM COATED ORAL at 22:09

## 2024-04-14 RX ADMIN — Medication 650 MILLIGRAM(S): at 03:51

## 2024-04-14 RX ADMIN — Medication 650 MILLIGRAM(S): at 06:17

## 2024-04-14 RX ADMIN — Medication 4: at 12:32

## 2024-04-14 RX ADMIN — Medication 81 MILLIGRAM(S): at 12:32

## 2024-04-14 RX ADMIN — Medication 4: at 07:13

## 2024-04-14 RX ADMIN — PIPERACILLIN AND TAZOBACTAM 200 GRAM(S): 4; .5 INJECTION, POWDER, LYOPHILIZED, FOR SOLUTION INTRAVENOUS at 05:18

## 2024-04-14 RX ADMIN — TAMSULOSIN HYDROCHLORIDE 0.4 MILLIGRAM(S): 0.4 CAPSULE ORAL at 22:09

## 2024-04-14 RX ADMIN — Medication 6: at 18:33

## 2024-04-14 RX ADMIN — Medication 25 MILLIGRAM(S): at 17:54

## 2024-04-14 RX ADMIN — Medication 750 MILLIGRAM(S): at 12:52

## 2024-04-14 RX ADMIN — PIPERACILLIN AND TAZOBACTAM 25 GRAM(S): 4; .5 INJECTION, POWDER, LYOPHILIZED, FOR SOLUTION INTRAVENOUS at 17:54

## 2024-04-14 RX ADMIN — ALBUTEROL 10 MILLIGRAM(S): 90 AEROSOL, METERED ORAL at 04:17

## 2024-04-14 RX ADMIN — SODIUM CHLORIDE 80 MILLILITER(S): 9 INJECTION INTRAMUSCULAR; INTRAVENOUS; SUBCUTANEOUS at 22:07

## 2024-04-14 RX ADMIN — APIXABAN 5 MILLIGRAM(S): 2.5 TABLET, FILM COATED ORAL at 17:54

## 2024-04-14 RX ADMIN — INSULIN HUMAN 5 UNIT(S): 100 INJECTION, SOLUTION SUBCUTANEOUS at 04:24

## 2024-04-14 RX ADMIN — LEVETIRACETAM 1000 MILLIGRAM(S): 250 TABLET, FILM COATED ORAL at 17:54

## 2024-04-14 NOTE — H&P ADULT - HIV OFFER
[facility]  Discharge Note    Pt discharged with followings belongings:   Dentures: None  Vision - Corrective Lenses: Contacts  Hearing Aid: None  Jewelry: None  Body Piercings Removed: N/A  Clothing: Footwear, Pants, Shirt, Socks  Were All Patient Medications Collected?: Yes  Other Valuables: Other (Comment) (meds and contact lenses)   Valuables sent home with Patient Valuables retrieved from safe, Security envelope number:  [id number]  and returned to patient. Patient left department with Departure Mode: By self, In cab via Mobility at Departure: Ambulatory, discharged to Discharged to: Shelter.  Patient education on aftercare instructions: yes  Information faxed to [facility] by [facility] Patient verbalize understanding of AVS:  Yes    Status EXAM upon discharge:  Status and Exam  Normal: No  Facial Expression: Avoids Gaze, Flat  Affect: Blunt  Level of Consciousness: Alert  Mood:Normal: No  Mood: Despair (denies depression,yet appears flat and non active)  Motor Activity:Normal: No  Motor Activity: Decreased  Interview Behavior: Evasive, Uncooperative/Withdrawn  Preception:  (difficult to assess)  Attention:Normal: No  Attention: Distractible, Unable to Concentrate  Thought Processes: Blocking  Thought Content:Normal: No  Thought Content: Poverty of Content, Preoccupations  Hallucinations: None (denies)  Delusions: No  Memory:Normal: No  Memory: Poor Recent, Poor Remote  Insight and Judgment: No  Insight and Judgment: Poor Judgment, Poor Insight, Unmotivated  Present Suicidal Ideation: No  Present Homicidal Ideation: No    Cherylene Gores, RN Opt out

## 2024-04-14 NOTE — ED PROVIDER NOTE - CLINICAL SUMMARY MEDICAL DECISION MAKING FREE TEXT BOX
73-year-old male with past history  of COPD CHF cardiac arrest BPH diabetes CAD hypertension stroke seizure TBI presenting with shortness of breath for 1 hour.  Patient is at [facility] rehab, intermittently received BiPAP throughout the day for COPD and CHF exacerbations routinely.  Patient was having what son believes was COPD exacerbation so was placed on BiPAP, however patient vomited while on BiPAP, sent to ED for concern of aspiration event.  Patient ANO x 3, however due to stroke cannot provide history.  Per son patient was in normal state of health prior besides mild cough.    On exam patient appears mildly lethargic, tachypneic, satting 98% on room air and otherwise hemodynamically stable.  Rhonchorous with increased work of breathing on lung exam    Clinical presentation likely secondary to aspiration event, less likely COPD exacerbation or CHF exacerbation.  Patient placed on BiPAP with improvement of respiratory status.  Patient febrile here, started on sepsis pathway, temperature improved with Tylenol.  Give patient Zosyn for likely aspiration pneumonia seen on x-ray.  Workup otherwise remarkable for elevated troponin likely secondary to demand.  Will admit to medicine with telemetry. WBC 15k, K 5.9, treated w hyperK cocktail including albuterol

## 2024-04-14 NOTE — H&P ADULT - PROBLEM SELECTOR PLAN 2
Troponin 277 - NO ST Changes on ECG   Most likely demand   - Tele   - Aspirin   - Trend Troponin   - ECG

## 2024-04-14 NOTE — H&P ADULT - PROBLEM SELECTOR PLAN 3
K:5.9 - ECG: No Peak T-wave   - Received Humulin 5U, Albuterol, Kayexalate  - Monitor   - Correct accordingly if needed

## 2024-04-14 NOTE — ED ADULT NURSE NOTE - OBJECTIVE STATEMENT
Pt presents to ED from [facility] as per JULIUS Abdi, pt vomited in Bipap mask tonight, wants a rule out for aspiration. Son at bedside. Safety maintained. Placed on cardiac monitor.

## 2024-04-14 NOTE — ED PROVIDER NOTE - PHYSICAL EXAMINATION
General: lethargic  HEENT: NCAT, Neck supple without meningismus, PERRL, no conjunctival injection  Lungs: b/l rhonci with increased work of breathing  Heart: S1S2 RRR, No M/R/G, Pules equal Bilaterally in upper and lower extremities distally  Abd: soft, NT/ND, No guarding, No rebound.  No hernias, no palpable masses.  Extrem: FROM in all joints, no gross deformities appreciated, no significant edema noted, No ulcers. Cap refil < 2sec.  Skin: No rash noted, warm dry.  Neuro:  Grossly normal.  No difficulty ambulating. No focal deficits. AOx3 (baseline)

## 2024-04-14 NOTE — PATIENT PROFILE ADULT - FALL HARM RISK - HARM RISK INTERVENTIONS

## 2024-04-14 NOTE — H&P ADULT - ASSESSMENT
73-year-old male with a PMH of  DM, HTN, Seizure, TBI, COPD CHF cardiac arrest BPH Presents to the ED from [facility] for Dyspnea. At baseline, patient uses BiPap intermittently throughout the rohan, patient had an episode of vomiting while on the Bipap. Then continued to have SOB for about an hour, sent to the ED for further evaluation. Upon ED arrival patient was tachypneic with increase work of breathing, placed on Bipap. Upon evaluation, patient is AAOx3, No distress, able to communicate full sentence (at his own pace due to CVA residual). Labs remarkable for WBC:15.30, K:5.9, troponin 277, ECG-NSR no T-wave or ST changes, BP:146/83, HR: 83 Temp: 100.9. Received Zosyn, Tylenol, Kayexalate, Humulin 5U, Albuterol.

## 2024-04-14 NOTE — PATIENT PROFILE ADULT - FUNCTIONAL ASSESSMENT - BASIC MOBILITY 5.
ASSUMED CARE PT SHIFT CHANGE. ASSESSMENT AS CHARTED.MEDS GIVEN PER MAR. BP
ELEVATED THIS SHIFT- PHYSICIAN NOTIFIED. ORDERS RECEIVED. C/O PAIN IN L ARM
MANAGED WITH IV PAIN MEDS. PT UPSET D/T PHYSICIAN NOT DISCHARGING AS BP IS IN
190S. PT WAS ADAMENT IN LEAVING AMA, PT COULD NOT FIND A RIDE. PT AGREED TO
STAY AND RECEIVE CARE. PHYSICIAN NOTIFIED.  PLAN IS FOR PT TO DC TOMORROW IF
BP STABLE. PT CURRENTLY RESTING IN BED, DENIES NEEDS AT THIS TIME. WILL CONT
TO MONITOR AND FOLLOW POC. WILL PASS ON REPORT TO NOC RN. 2 = A lot of assistance

## 2024-04-14 NOTE — ED ADULT NURSE NOTE - NSFALLRISKINTERV_ED_ALL_ED

## 2024-04-14 NOTE — ED ADULT NURSE NOTE - PATIENT'S PREFERRED PRONOUN
Patient states this morning he felt weird at work   Patient states his communication was off and he is overly tired  Patient states he has had a stroke in the past  Transferred to a nurse   Him/He

## 2024-04-14 NOTE — ED PROVIDER NOTE - NS ED ROS FT
CONST: no fevers, no chills  EYES: no pain, no vision changes  ENT: no sore throat, no ear pain, no change in hearing  CV: no chest pain, no leg swelling  RESP: (+) shortness of breath, (+) cough  ABD: no abdominal pain, no nausea, (+) vomiting, no diarrhea  : no dysuria, no flank pain, no hematuria  MSK: no back pain, no extremity pain  NEURO: no headache or additional neurologic complaints  HEME: no easy bleeding  SKIN:  no rash

## 2024-04-14 NOTE — ED ADULT TRIAGE NOTE - CHIEF COMPLAINT QUOTE
from [facility], per JULIUS Abdi, pt vomited into B-pap mask when they where changing him tonight. sent to r/o aspiration.   son also states he noticed wheezing yesterday morning.  pt is at baseline for speech, non-ambulatory.

## 2024-04-14 NOTE — H&P ADULT - NSHPPHYSICALEXAM_GEN_ALL_CORE
GENERAL: NAD, comfortable at bedside   HEAD:  Atraumatic, Normocephalic  EYES: EOMI, PERRL, conjunctiva and sclera clear  NECK: Supple, No JVD  LUNG: Rhonchi, No increase work of breathing   HEART: Regular rate and rhythm; No murmurs, rubs, or gallops, (+)S1, S2  ABDOMEN: Soft, Nontender, Nondistended; Normal Bowel sounds   EXTREMITIES:  2+ Peripheral Pulses, No clubbing, cyanosis, or edema  PSYCH: normal mood and affect  NEUROLOGY: AAOx3, non-focal  SKIN: No rashes or lesions

## 2024-04-14 NOTE — H&P ADULT - PROBLEM SELECTOR PLAN 1
Acute respiratory distress upon ED arrival   History of COPD with intermittent BiPAP use   - BiPAP - Wean off as tolerated   - Zosyn   - Duoneb   - F/U Culture   - Monitor resp status  - DVT prophylaxis (Eliquis)

## 2024-04-14 NOTE — H&P ADULT - NSHPLABSRESULTS_GEN_ALL_CORE
16.5   15.30 )-----------( 193      ( 14 Apr 2024 03:05 )             50.3     136  |  101  |  30<H>  ----------------------------<  173<H>     04-14  5.9<H>   |  21<L>  |  1.00    Ca    9.0      14 Apr 2024 03:05    TPro  7.6  /  Alb  2.7<L>  /  TBili  0.6  /  DBili  x   /  AST  72<H>  /  ALT  25  /  AlkPhos  67  04-14            Urinalysis Basic - ( 14 Apr 2024 04:39 )  Color: Yellow / Appearance: Clear / SG: >1.030 / pH: 5.5  Gluc: >=1000 mg/dL / Ketone: 40 mg/dL  / Bili: Negative / Urobili: 1.0 mg/dL   Blood: Negative / Protein: 100 mg/dL / Nitrite: Negative   Leuk Esterase: Negative / RBC: 2 /HPF / WBC 6 /HPF   Sq Epi: Present / Non Sq Epi: x / Bacteria: Few /HPF

## 2024-04-14 NOTE — ED ADULT NURSE NOTE - CHIEF COMPLAINT QUOTE
from [facility], per JULIUS Abdi, pt vomited into B-pap mask when they where changing him tonight. sent to r/o aspiration.   son also states he noticed wheezing yesterday morning.  pt is at baseline for speech, non-ambulatory.

## 2024-04-14 NOTE — CHART NOTE - NSCHARTNOTEFT_GEN_A_CORE
patient seen and examined. son at bedside.   discussed with nurse and RT>> to taper down oxygen to 21% if possible and then take off BiPAP.   con current oral meds  cont IVF as BUN/Cr increasing.   DC lasix and primidone per son.   follow labs in am.   No need for CT head at this time.

## 2024-04-14 NOTE — H&P ADULT - HISTORY OF PRESENT ILLNESS
73-year-old male with a PMH of  DM, HTN, Seizure, TBI, COPD CHF cardiac arrest BPH Presents to the ED from [facility] for Dyspnea. At baseline, patient uses BiPap intermittently throughout the rohan, patient had an episode of vomiting while on the Bipap. Then continued to have SOB for about an hour, sent to the ED for further evaluation. Upon ED arrival patient was tachypneic with increase work of breathing, placed on Bipap. Upon evaluation, patient is AAOx3, No distress, able to communicate full sentence (at his own pace due to CVA residual). Labs remarkable for WBC:15.30, K:5.9, troponin 277, ECG-NSR no T-wave or ST changes, BP:146/83, HR: 83 Temp: 100.9. Received Zosyn, Tylenol, Kayexalate, Humulin 5U, Albuterol.

## 2024-04-15 DIAGNOSIS — R25.1 TREMOR, UNSPECIFIED: ICD-10-CM

## 2024-04-15 DIAGNOSIS — R26.81 UNSTEADINESS ON FEET: ICD-10-CM

## 2024-04-15 DIAGNOSIS — M79.602 PAIN IN LEFT ARM: ICD-10-CM

## 2024-04-15 LAB
A1C WITH ESTIMATED AVERAGE GLUCOSE RESULT: 9.2 % — HIGH (ref 4–5.6)
ANION GAP SERPL CALC-SCNC: 9 MMOL/L — SIGNIFICANT CHANGE UP (ref 5–17)
BUN SERPL-MCNC: 38 MG/DL — HIGH (ref 7–23)
CALCIUM SERPL-MCNC: 8.2 MG/DL — LOW (ref 8.5–10.1)
CHLORIDE SERPL-SCNC: 106 MMOL/L — SIGNIFICANT CHANGE UP (ref 96–108)
CO2 SERPL-SCNC: 27 MMOL/L — SIGNIFICANT CHANGE UP (ref 22–31)
CREAT SERPL-MCNC: 1.13 MG/DL — SIGNIFICANT CHANGE UP (ref 0.5–1.3)
EGFR: 68 ML/MIN/1.73M2 — SIGNIFICANT CHANGE UP
ESTIMATED AVERAGE GLUCOSE: 217 MG/DL — HIGH (ref 68–114)
GLUCOSE BLDC GLUCOMTR-MCNC: 200 MG/DL — HIGH (ref 70–99)
GLUCOSE BLDC GLUCOMTR-MCNC: 254 MG/DL — HIGH (ref 70–99)
GLUCOSE BLDC GLUCOMTR-MCNC: 257 MG/DL — HIGH (ref 70–99)
GLUCOSE BLDC GLUCOMTR-MCNC: 271 MG/DL — HIGH (ref 70–99)
GLUCOSE SERPL-MCNC: 230 MG/DL — HIGH (ref 70–99)
HCT VFR BLD CALC: 39.9 % — SIGNIFICANT CHANGE UP (ref 39–50)
HGB BLD-MCNC: 12.8 G/DL — LOW (ref 13–17)
MCHC RBC-ENTMCNC: 30.5 PG — SIGNIFICANT CHANGE UP (ref 27–34)
MCHC RBC-ENTMCNC: 32.1 G/DL — SIGNIFICANT CHANGE UP (ref 32–36)
MCV RBC AUTO: 95.2 FL — SIGNIFICANT CHANGE UP (ref 80–100)
NRBC # BLD: 0 /100 WBCS — SIGNIFICANT CHANGE UP (ref 0–0)
PLATELET # BLD AUTO: 171 K/UL — SIGNIFICANT CHANGE UP (ref 150–400)
POTASSIUM SERPL-MCNC: 3.7 MMOL/L — SIGNIFICANT CHANGE UP (ref 3.5–5.3)
POTASSIUM SERPL-SCNC: 3.7 MMOL/L — SIGNIFICANT CHANGE UP (ref 3.5–5.3)
RBC # BLD: 4.19 M/UL — LOW (ref 4.2–5.8)
RBC # FLD: 15.5 % — HIGH (ref 10.3–14.5)
SODIUM SERPL-SCNC: 142 MMOL/L — SIGNIFICANT CHANGE UP (ref 135–145)
WBC # BLD: 10.96 K/UL — HIGH (ref 3.8–10.5)
WBC # FLD AUTO: 10.96 K/UL — HIGH (ref 3.8–10.5)

## 2024-04-15 PROCEDURE — 99233 SBSQ HOSP IP/OBS HIGH 50: CPT

## 2024-04-15 RX ADMIN — SODIUM CHLORIDE 80 MILLILITER(S): 9 INJECTION INTRAMUSCULAR; INTRAVENOUS; SUBCUTANEOUS at 11:18

## 2024-04-15 RX ADMIN — LEVETIRACETAM 1000 MILLIGRAM(S): 250 TABLET, FILM COATED ORAL at 06:11

## 2024-04-15 RX ADMIN — PIPERACILLIN AND TAZOBACTAM 25 GRAM(S): 4; .5 INJECTION, POWDER, LYOPHILIZED, FOR SOLUTION INTRAVENOUS at 08:06

## 2024-04-15 RX ADMIN — TAMSULOSIN HYDROCHLORIDE 0.4 MILLIGRAM(S): 0.4 CAPSULE ORAL at 21:31

## 2024-04-15 RX ADMIN — Medication 81 MILLIGRAM(S): at 11:17

## 2024-04-15 RX ADMIN — PIPERACILLIN AND TAZOBACTAM 25 GRAM(S): 4; .5 INJECTION, POWDER, LYOPHILIZED, FOR SOLUTION INTRAVENOUS at 17:19

## 2024-04-15 RX ADMIN — ATORVASTATIN CALCIUM 80 MILLIGRAM(S): 80 TABLET, FILM COATED ORAL at 21:31

## 2024-04-15 RX ADMIN — APIXABAN 5 MILLIGRAM(S): 2.5 TABLET, FILM COATED ORAL at 17:19

## 2024-04-15 RX ADMIN — LEVETIRACETAM 1000 MILLIGRAM(S): 250 TABLET, FILM COATED ORAL at 17:18

## 2024-04-15 RX ADMIN — Medication 25 MILLIGRAM(S): at 06:11

## 2024-04-15 RX ADMIN — Medication 25 MILLIGRAM(S): at 17:19

## 2024-04-15 RX ADMIN — BUDESONIDE AND FORMOTEROL FUMARATE DIHYDRATE 2 PUFF(S): 160; 4.5 AEROSOL RESPIRATORY (INHALATION) at 06:11

## 2024-04-15 RX ADMIN — Medication 2: at 08:06

## 2024-04-15 RX ADMIN — PIPERACILLIN AND TAZOBACTAM 25 GRAM(S): 4; .5 INJECTION, POWDER, LYOPHILIZED, FOR SOLUTION INTRAVENOUS at 00:21

## 2024-04-15 RX ADMIN — APIXABAN 5 MILLIGRAM(S): 2.5 TABLET, FILM COATED ORAL at 06:12

## 2024-04-15 RX ADMIN — BUDESONIDE AND FORMOTEROL FUMARATE DIHYDRATE 2 PUFF(S): 160; 4.5 AEROSOL RESPIRATORY (INHALATION) at 17:18

## 2024-04-15 RX ADMIN — Medication 6: at 17:18

## 2024-04-15 RX ADMIN — Medication 750 MILLIGRAM(S): at 11:17

## 2024-04-15 RX ADMIN — Medication 6: at 11:17

## 2024-04-15 NOTE — PROGRESS NOTE ADULT - ASSESSMENT
A/P    73-year-old male with a PMH of  DM, HTN, Seizure, TBI, COPD CHF cardiac arrest BPH Presents to the ED from [facility] for Dyspnea. At baseline, patient uses BiPap intermittently throughout the rohan, patient had an episode of vomiting while on the Bipap. Then continued to have SOB for about an hour, sent to the ED for further evaluation. Upon ED arrival patient was tachypneic with increase work of breathing, placed on Bipap. Upon evaluation, patient is AAOx3, No distress, able to communicate full sentence (at his own pace due to CVA residual). Labs remarkable for WBC:15.30, K:5.9, troponin 277, ECG-NSR no T-wave or ST changes, BP:146/83, HR: 83 Temp: 100.9. Received Zosyn, Tylenol, Kayexalate, Humulin 5U, Albuterol.       Problem/Plan - 1:  ·  Problem: Pneumonia, aspiration.   ·  Plan: Acute respiratory distress upon ED arrival, now on room air  - Zosyn   - Duoneb   - F/U Culture   - Monitor resp status  - DVT prophylaxis (Eliquis).     Problem/Plan - 2:  ·  Problem: Elevated troponin.   ·  Plan: Troponin 277 - NO ST Changes on ECG   Most likely demand   - Tele   - Aspirin   - Trend Troponin   - ECG.     Problem/Plan - 3:  ·  Problem: Hyperkalemia.   ·  Plan: Improved     Problem/Plan - 4:  ·  Problem: Hypertension.   ·  Plan: Normotensive   Continue Home meds   - Metoprolol Tart.     Problem/Plan - 5:  ·  Problem: Seizure.   ·  Plan: Continue home meds   - Keppra 1000mg  - Valproic 750mg  - Primidone 100mg   - Seizure precaution.     Problem/Plan - 6:  ·  Problem: Diabetes mellitus.   ·  Plan: ISS with hypoglycemia protocol       Nahun Coyne MD

## 2024-04-16 LAB
ALBUMIN SERPL ELPH-MCNC: 2.1 G/DL — LOW (ref 3.3–5)
ALP SERPL-CCNC: 66 U/L — SIGNIFICANT CHANGE UP (ref 40–120)
ALT FLD-CCNC: 21 U/L — SIGNIFICANT CHANGE UP (ref 12–78)
ANION GAP SERPL CALC-SCNC: 8 MMOL/L — SIGNIFICANT CHANGE UP (ref 5–17)
AST SERPL-CCNC: 23 U/L — SIGNIFICANT CHANGE UP (ref 15–37)
BILIRUB SERPL-MCNC: 0.3 MG/DL — SIGNIFICANT CHANGE UP (ref 0.2–1.2)
BLD GP AB SCN SERPL QL: SIGNIFICANT CHANGE UP
BUN SERPL-MCNC: 26 MG/DL — HIGH (ref 7–23)
CALCIUM SERPL-MCNC: 8.4 MG/DL — LOW (ref 8.5–10.1)
CHLORIDE SERPL-SCNC: 107 MMOL/L — SIGNIFICANT CHANGE UP (ref 96–108)
CO2 SERPL-SCNC: 27 MMOL/L — SIGNIFICANT CHANGE UP (ref 22–31)
CREAT SERPL-MCNC: 0.96 MG/DL — SIGNIFICANT CHANGE UP (ref 0.5–1.3)
DIR ANTIGLOB POLYSPECIFIC INTERPRETATION: SIGNIFICANT CHANGE UP
EGFR: 82 ML/MIN/1.73M2 — SIGNIFICANT CHANGE UP
FLUAV AG NPH QL: SIGNIFICANT CHANGE UP
FLUBV AG NPH QL: SIGNIFICANT CHANGE UP
GAS PNL BLDA: SIGNIFICANT CHANGE UP
GAS PNL BLDA: SIGNIFICANT CHANGE UP
GLUCOSE BLDC GLUCOMTR-MCNC: 171 MG/DL — HIGH (ref 70–99)
GLUCOSE BLDC GLUCOMTR-MCNC: 218 MG/DL — HIGH (ref 70–99)
GLUCOSE BLDC GLUCOMTR-MCNC: 237 MG/DL — HIGH (ref 70–99)
GLUCOSE BLDC GLUCOMTR-MCNC: 245 MG/DL — HIGH (ref 70–99)
GLUCOSE BLDC GLUCOMTR-MCNC: 314 MG/DL — HIGH (ref 70–99)
GLUCOSE SERPL-MCNC: 282 MG/DL — HIGH (ref 70–99)
HCT VFR BLD CALC: 41.1 % — SIGNIFICANT CHANGE UP (ref 39–50)
HCT VFR BLD CALC: 42.1 % — SIGNIFICANT CHANGE UP (ref 39–50)
HGB BLD-MCNC: 13.2 G/DL — SIGNIFICANT CHANGE UP (ref 13–17)
HGB BLD-MCNC: 13.3 G/DL — SIGNIFICANT CHANGE UP (ref 13–17)
MCHC RBC-ENTMCNC: 30.1 PG — SIGNIFICANT CHANGE UP (ref 27–34)
MCHC RBC-ENTMCNC: 30.3 PG — SIGNIFICANT CHANGE UP (ref 27–34)
MCHC RBC-ENTMCNC: 31.6 G/DL — LOW (ref 32–36)
MCHC RBC-ENTMCNC: 32.1 G/DL — SIGNIFICANT CHANGE UP (ref 32–36)
MCV RBC AUTO: 94.5 FL — SIGNIFICANT CHANGE UP (ref 80–100)
MCV RBC AUTO: 95.2 FL — SIGNIFICANT CHANGE UP (ref 80–100)
NRBC # BLD: 0 /100 WBCS — SIGNIFICANT CHANGE UP (ref 0–0)
NRBC # BLD: 0 /100 WBCS — SIGNIFICANT CHANGE UP (ref 0–0)
PLATELET # BLD AUTO: 175 K/UL — SIGNIFICANT CHANGE UP (ref 150–400)
PLATELET # BLD AUTO: 181 K/UL — SIGNIFICANT CHANGE UP (ref 150–400)
POTASSIUM SERPL-MCNC: 3.8 MMOL/L — SIGNIFICANT CHANGE UP (ref 3.5–5.3)
POTASSIUM SERPL-SCNC: 3.8 MMOL/L — SIGNIFICANT CHANGE UP (ref 3.5–5.3)
PROT SERPL-MCNC: 6.3 GM/DL — SIGNIFICANT CHANGE UP (ref 6–8.3)
RBC # BLD: 4.35 M/UL — SIGNIFICANT CHANGE UP (ref 4.2–5.8)
RBC # BLD: 4.42 M/UL — SIGNIFICANT CHANGE UP (ref 4.2–5.8)
RBC # FLD: 15 % — HIGH (ref 10.3–14.5)
RBC # FLD: 15.2 % — HIGH (ref 10.3–14.5)
SARS-COV-2 RNA SPEC QL NAA+PROBE: SIGNIFICANT CHANGE UP
SODIUM SERPL-SCNC: 142 MMOL/L — SIGNIFICANT CHANGE UP (ref 135–145)
WBC # BLD: 11.64 K/UL — HIGH (ref 3.8–10.5)
WBC # BLD: 9.64 K/UL — SIGNIFICANT CHANGE UP (ref 3.8–10.5)
WBC # FLD AUTO: 11.64 K/UL — HIGH (ref 3.8–10.5)
WBC # FLD AUTO: 9.64 K/UL — SIGNIFICANT CHANGE UP (ref 3.8–10.5)

## 2024-04-16 PROCEDURE — 86077 PHYS BLOOD BANK SERV XMATCH: CPT

## 2024-04-16 PROCEDURE — 99291 CRITICAL CARE FIRST HOUR: CPT | Mod: 25

## 2024-04-16 PROCEDURE — 99223 1ST HOSP IP/OBS HIGH 75: CPT | Mod: 25

## 2024-04-16 PROCEDURE — 99232 SBSQ HOSP IP/OBS MODERATE 35: CPT

## 2024-04-16 PROCEDURE — 76937 US GUIDE VASCULAR ACCESS: CPT | Mod: 26

## 2024-04-16 PROCEDURE — 31720 CLEARANCE OF AIRWAYS: CPT

## 2024-04-16 PROCEDURE — 36000 PLACE NEEDLE IN VEIN: CPT | Mod: 59

## 2024-04-16 PROCEDURE — 99358 PROLONG SERVICE W/O CONTACT: CPT

## 2024-04-16 PROCEDURE — 71045 X-RAY EXAM CHEST 1 VIEW: CPT | Mod: 26

## 2024-04-16 PROCEDURE — 94667 MNPJ CHEST WALL 1ST: CPT

## 2024-04-16 PROCEDURE — 31500 INSERT EMERGENCY AIRWAY: CPT

## 2024-04-16 PROCEDURE — 99291 CRITICAL CARE FIRST HOUR: CPT

## 2024-04-16 RX ORDER — FENTANYL CITRATE 50 UG/ML
0.5 INJECTION INTRAVENOUS
Qty: 2500 | Refills: 0 | Status: DISCONTINUED | OUTPATIENT
Start: 2024-04-16 | End: 2024-04-20

## 2024-04-16 RX ORDER — CHLORHEXIDINE GLUCONATE 213 G/1000ML
15 SOLUTION TOPICAL EVERY 12 HOURS
Refills: 0 | Status: DISCONTINUED | OUTPATIENT
Start: 2024-04-16 | End: 2024-04-23

## 2024-04-16 RX ORDER — ETOMIDATE 2 MG/ML
20 INJECTION INTRAVENOUS ONCE
Refills: 0 | Status: COMPLETED | OUTPATIENT
Start: 2024-04-16 | End: 2024-04-16

## 2024-04-16 RX ORDER — MIDAZOLAM HYDROCHLORIDE 1 MG/ML
4 INJECTION, SOLUTION INTRAMUSCULAR; INTRAVENOUS ONCE
Refills: 0 | Status: DISCONTINUED | OUTPATIENT
Start: 2024-04-16 | End: 2024-04-16

## 2024-04-16 RX ORDER — NOREPINEPHRINE BITARTRATE/D5W 8 MG/250ML
0.05 PLASTIC BAG, INJECTION (ML) INTRAVENOUS
Qty: 8 | Refills: 0 | Status: DISCONTINUED | OUTPATIENT
Start: 2024-04-16 | End: 2024-04-17

## 2024-04-16 RX ORDER — CHLORHEXIDINE GLUCONATE 213 G/1000ML
1 SOLUTION TOPICAL
Refills: 0 | Status: DISCONTINUED | OUTPATIENT
Start: 2024-04-16 | End: 2024-05-02

## 2024-04-16 RX ORDER — SODIUM CHLORIDE 9 MG/ML
500 INJECTION, SOLUTION INTRAVENOUS ONCE
Refills: 0 | Status: COMPLETED | OUTPATIENT
Start: 2024-04-16 | End: 2024-04-16

## 2024-04-16 RX ORDER — PROPOFOL 10 MG/ML
10 INJECTION, EMULSION INTRAVENOUS
Qty: 1000 | Refills: 0 | Status: DISCONTINUED | OUTPATIENT
Start: 2024-04-16 | End: 2024-04-19

## 2024-04-16 RX ORDER — SODIUM CHLORIDE 9 MG/ML
1000 INJECTION, SOLUTION INTRAVENOUS ONCE
Refills: 0 | Status: DISCONTINUED | OUTPATIENT
Start: 2024-04-16 | End: 2024-04-16

## 2024-04-16 RX ORDER — IPRATROPIUM/ALBUTEROL SULFATE 18-103MCG
3 AEROSOL WITH ADAPTER (GRAM) INHALATION EVERY 6 HOURS
Refills: 0 | Status: DISCONTINUED | OUTPATIENT
Start: 2024-04-16 | End: 2024-05-02

## 2024-04-16 RX ORDER — INSULIN LISPRO 100/ML
VIAL (ML) SUBCUTANEOUS EVERY 6 HOURS
Refills: 0 | Status: DISCONTINUED | OUTPATIENT
Start: 2024-04-16 | End: 2024-04-17

## 2024-04-16 RX ADMIN — ATORVASTATIN CALCIUM 80 MILLIGRAM(S): 80 TABLET, FILM COATED ORAL at 21:34

## 2024-04-16 RX ADMIN — Medication 25 MILLIGRAM(S): at 05:34

## 2024-04-16 RX ADMIN — Medication 81 MILLIGRAM(S): at 11:31

## 2024-04-16 RX ADMIN — PROPOFOL 7.59 MICROGRAM(S)/KG/MIN: 10 INJECTION, EMULSION INTRAVENOUS at 14:30

## 2024-04-16 RX ADMIN — LEVETIRACETAM 1000 MILLIGRAM(S): 250 TABLET, FILM COATED ORAL at 19:42

## 2024-04-16 RX ADMIN — Medication 4: at 23:13

## 2024-04-16 RX ADMIN — PIPERACILLIN AND TAZOBACTAM 25 GRAM(S): 4; .5 INJECTION, POWDER, LYOPHILIZED, FOR SOLUTION INTRAVENOUS at 09:34

## 2024-04-16 RX ADMIN — Medication 4: at 11:31

## 2024-04-16 RX ADMIN — PIPERACILLIN AND TAZOBACTAM 25 GRAM(S): 4; .5 INJECTION, POWDER, LYOPHILIZED, FOR SOLUTION INTRAVENOUS at 19:29

## 2024-04-16 RX ADMIN — PROPOFOL 7.59 MICROGRAM(S)/KG/MIN: 10 INJECTION, EMULSION INTRAVENOUS at 17:43

## 2024-04-16 RX ADMIN — APIXABAN 5 MILLIGRAM(S): 2.5 TABLET, FILM COATED ORAL at 05:35

## 2024-04-16 RX ADMIN — CHLORHEXIDINE GLUCONATE 15 MILLILITER(S): 213 SOLUTION TOPICAL at 19:29

## 2024-04-16 RX ADMIN — Medication 2: at 08:04

## 2024-04-16 RX ADMIN — BUDESONIDE AND FORMOTEROL FUMARATE DIHYDRATE 2 PUFF(S): 160; 4.5 AEROSOL RESPIRATORY (INHALATION) at 05:34

## 2024-04-16 RX ADMIN — PROPOFOL 7.59 MICROGRAM(S)/KG/MIN: 10 INJECTION, EMULSION INTRAVENOUS at 19:13

## 2024-04-16 RX ADMIN — Medication 3 MILLILITER(S): at 17:23

## 2024-04-16 RX ADMIN — PROPOFOL 7.59 MICROGRAM(S)/KG/MIN: 10 INJECTION, EMULSION INTRAVENOUS at 22:55

## 2024-04-16 RX ADMIN — SODIUM CHLORIDE 80 MILLILITER(S): 9 INJECTION INTRAMUSCULAR; INTRAVENOUS; SUBCUTANEOUS at 01:42

## 2024-04-16 RX ADMIN — FENTANYL CITRATE 6.33 MICROGRAM(S)/KG/HR: 50 INJECTION INTRAVENOUS at 19:13

## 2024-04-16 RX ADMIN — PROPOFOL 7.59 MICROGRAM(S)/KG/MIN: 10 INJECTION, EMULSION INTRAVENOUS at 15:43

## 2024-04-16 RX ADMIN — TAMSULOSIN HYDROCHLORIDE 0.4 MILLIGRAM(S): 0.4 CAPSULE ORAL at 21:34

## 2024-04-16 RX ADMIN — Medication 750 MILLIGRAM(S): at 11:31

## 2024-04-16 RX ADMIN — Medication 4: at 19:25

## 2024-04-16 RX ADMIN — PIPERACILLIN AND TAZOBACTAM 25 GRAM(S): 4; .5 INJECTION, POWDER, LYOPHILIZED, FOR SOLUTION INTRAVENOUS at 01:35

## 2024-04-16 RX ADMIN — CHLORHEXIDINE GLUCONATE 1 APPLICATION(S): 213 SOLUTION TOPICAL at 15:42

## 2024-04-16 RX ADMIN — MIDAZOLAM HYDROCHLORIDE 4 MILLIGRAM(S): 1 INJECTION, SOLUTION INTRAMUSCULAR; INTRAVENOUS at 15:38

## 2024-04-16 RX ADMIN — SODIUM CHLORIDE 500 MILLILITER(S): 9 INJECTION, SOLUTION INTRAVENOUS at 18:00

## 2024-04-16 RX ADMIN — LEVETIRACETAM 1000 MILLIGRAM(S): 250 TABLET, FILM COATED ORAL at 05:35

## 2024-04-16 RX ADMIN — FENTANYL CITRATE 6.33 MICROGRAM(S)/KG/HR: 50 INJECTION INTRAVENOUS at 15:43

## 2024-04-16 NOTE — CONSULT NOTE ADULT - SUBJECTIVE AND OBJECTIVE BOX
HPI:  73-year-old male with a PMH of  DM, HTN, Seizure, TBI, COPD CHF cardiac arrest BPH Presents to the ED from [facility] for Dyspnea. At baseline, patient uses BiPap intermittently throughout the day, patient had an episode of vomiting while on the Bipap. Then continued to have SOB for about an hour, sent to the ED for further evaluation. Upon ED arrival patient was tachypneic with increase work of breathing, placed on Bipap. Upon evaluation, patient is AAOx3, No distress, able to communicate full sentence (at his own pace due to CVA residual). Labs remarkable for WBC:15.30, K:5.9, troponin 277, ECG-NSR no T-wave or ST changes, BP:146/83, HR: 83 Temp: 100.9. Received Zosyn, Tylenol, Kayexalate, Humulin 5U, Albuterol. (14 Apr 2024 07:04)    ===============  HX obtained from chart  RRT called for hypoxia  Pt currently obtunded    Admitted from [facility] rehab for aspiration PNA.  Placed on zosyn  D/C planning back to rehab in progress however this afternoon RRT called by bedside nurse.  Pt found with bottle of water in hand, lethargic, in respiratory distress, hypoxic to the 40s on room air, cyanotic. Abdominally breathing with accessory muscle use. Poor air movement on lung exam. Placed on non rebreather with O2 sat lot to mid 90s. Hypertensive SBP 200s DBP 100s    Manual chest PT, nasal tracheal suctioning performed with minimal return of secretions. Pt difficult to arouse. Baseline per nursing staff communicates and interacts with staff.     Duonebs x1 given. STAT ABG performed 7.15/ 88 / 224 / 31 / 97.7 % (on non rebreather). Decision to transfer patient to ICU for intubation.   Pt full code      Allergies  No Known Allergies        MEDICATIONS  (STANDING):  apixaban 5 milliGRAM(s) Oral every 12 hours  aspirin enteric coated 81 milliGRAM(s) Oral daily  atorvastatin 80 milliGRAM(s) Oral at bedtime  budesonide 160 MICROgram(s)/formoterol 4.5 MICROgram(s) Inhaler 2 Puff(s) Inhalation two times a day  dextrose 10% Bolus 125 milliLiter(s) IV Bolus once  dextrose 5%. 1000 milliLiter(s) (50 mL/Hr) IV Continuous <Continuous>  dextrose 50% Injectable 25 Gram(s) IV Push once  glucagon  Injectable 1 milliGRAM(s) IntraMuscular once  insulin lispro (ADMELOG) corrective regimen sliding scale   SubCutaneous three times a day before meals  levETIRAcetam 1000 milliGRAM(s) Oral two times a day  metoprolol tartrate 25 milliGRAM(s) Oral two times a day  piperacillin/tazobactam IVPB.. 3.375 Gram(s) IV Intermittent every 8 hours  sodium chloride 0.9%. 1000 milliLiter(s) (80 mL/Hr) IV Continuous <Continuous>  tamsulosin 0.4 milliGRAM(s) Oral at bedtime  valproic acid 750 milliGRAM(s) Oral daily    MEDICATIONS  (PRN):  acetaminophen     Tablet .. 650 milliGRAM(s) Oral every 6 hours PRN Temp greater or equal to 38C (100.4F), Mild Pain (1 - 3)  albuterol    90 MICROgram(s) HFA Inhaler 2 Puff(s) Inhalation every 6 hours PRN Shortness of Breath and/or Wheezing  aluminum hydroxide/magnesium hydroxide/simethicone Suspension 30 milliLiter(s) Oral every 4 hours PRN Dyspepsia  dextrose Oral Gel 15 Gram(s) Oral once PRN Blood Glucose LESS THAN 70 milliGRAM(s)/deciliter  melatonin 3 milliGRAM(s) Oral at bedtime PRN Insomnia  ondansetron Injectable 4 milliGRAM(s) IV Push every 8 hours PRN Nausea and/or Vomiting        Drug Dosing Weight  Height (cm): 165.1 (14 Apr 2024 10:20)  Weight (kg): 89.2 (16 Apr 2024 15:00)  BMI (kg/m2): 32.7 (16 Apr 2024 15:00)  BSA (m2): 1.96 (16 Apr 2024 15:00)    PAST MEDICAL & SURGICAL HISTORY:  HTN (hypertension)      HLD (hyperlipidemia)      Diabetes mellitus      Lacunar infarction      BPH (benign prostatic hyperplasia)      CHF (congestive heart failure)      Chronic obstructive pulmonary disease, unspecified COPD type      BPH without urinary obstruction      History of chronic ischemic heart disease      Cardiac pacemaker      Cardiac arrest      Seizures      TBI (traumatic brain injury)      S/P CABG (coronary artery bypass graft)  2014          FAMILY HISTORY:  No pertinent family history in first degree relatives        SOCIAL HISTORY:  no smoking  no ETOH    ADVANCE DIRECTIVES:  full code    REVIEW OF SYSTEMS:  [x] unable to obtain due to metabolic encephalopathy       T(C): 37.1 (16 Apr 2024 10:33), Max: 37.1 (16 Apr 2024 10:33)  T(F): 98.8 (16 Apr 2024 10:33), Max: 98.8 (16 Apr 2024 10:33)  HR: 79 (16 Apr 2024 10:33) (70 - 79)  BP: 174/82 (16 Apr 2024 10:33) (148/72 - 174/82)  RR: 16 (16 Apr 2024 10:33) (16 - 18)  SpO2: 95% (16 Apr 2024 10:33) (95% - 100%)        PHYSICAL EXAM:  GENERAL: lethargic, accessory muscle use with respiration  HEAD:  Normocephalic  EYES: conjunctiva and sclera clear  ENMT:  Moist mucous membranes  NECK: Supple, No JVD, old trach stoma scar healed  NERVOUS SYSTEM:  lethargic/obtunded, AAOx0  CHEST/LUNG: bilateral decreased air movement, no rales, no wheeze  HEART: Regular rate and rhythm  ABDOMEN: Soft, protuberant, + Bowel sounds present  EXTREMITIES:  2+ Peripheral Pulses, No clubbing, cyanosis, or edema  SKIN: No rashes or lesions    LABS:               13.2   9.64  )-----------( 175      ( 16 Apr 2024 07:40 )             41.1         04-15    142  |  106  |  38<H>  ----------------------------<  230<H>  3.7   |  27  |  1.13    Ca    8.2<L>      15 Apr 2024 05:25      ABG - ( 16 Apr 2024 13:55 )  pH, Arterial: 7.15 /  pCO2: 88    /  pO2: 224   / HCO3: 31 / Base Excess: -1.2  /  SaO2: 97.7     Flu With COVID-19 By CHARISSA (04.16.24 @ 11:08)   SARS-CoV-2 Result: NotDetec: EUA/IVD   Influenza A Result: NotDetec: EUA/IVD   Influenza B Result: NotDetec: EUA/IVD         RADIOLOGY:  < from: Xray Chest 1 View-PORTABLE IMMEDIATE (04.14.24 @ 04:55) >  Heart size, mediastinal and hilar contours are within normal   limits. Poststernotomy. Left-sided trilead permanent pacemaker. Increased   opacity over the lower lung zones which may represent compositional   shadowartifact. No consolidation, pleural effusions or pneumothorax.

## 2024-04-16 NOTE — CONSULT NOTE ADULT - CRITICAL CARE ATTENDING COMMENT
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 Pt with improvement in MS. s/p trach/PEG on 12/5 Pt is a 73 yo M with h/o COPD, CAD s/p PCI with stent 2015 and CABG 2014, chronic diastolic heart failure (EF 50%), 2nd degree AV block s/p medtronic PPM, HTN, DM, CKD 3, and CVA (lacunar infarct) and prolonged ICU/hospitalization starting 11/2022 2 to cardiac arrest with admitting dx: 1) Hypoglycemia 2) PEA arrest 3) Takotsubo cardiomyopathy found on Echo 4) New onset Sz either 2 to hypoglycemia vs anoxic injury 5) ELMER 2 to ATN 6) Shock liver. s/p trach/PEG on 12/5. Pt initially felt to have anoxic encephalopathy but MS improved. Eventually pt with removal of trach and PEG. Pt transferred fro [facility] on 4/14 2 to vomiting while on BiPAP; admitted with working dx of aspiration PNA. Today RRT called 2 to hypoxemia which improved, resp distress, lethargy and HTN. In the ICU pt intubated. ICU dx: Acute hypoxic and hypercapneic resp failure requiring intubation possibly 2 to aspiration    Resp: Adjust MV to ABG/ Cont DuoNebs  ID: Cont Zosyn  CVS: If BP remains elevated cont pt BB  HEME: Check reason for Eliquis  FEN: May start enteral feeds  Endo: Follow FS and cover with Lispro  Renal: Follow BUN/Cr and UO  Neuro: Cont pt's Valproic acid and Keppra/ Light sedation with Propofol + Fentanyl  Social: Pt is full code    CCT: 45 min not in conjunction with the PA

## 2024-04-16 NOTE — PROCEDURE NOTE - NSPROCDETAILS_GEN_ALL_CORE
Have You Had Fillers Before?: has not had fillers
Additional History: Patient notes concerns with her under eye bags and lips. Patient understands she cannot eliminate her under eye bags, but  wants to enhance the appearance.
location identified, draped/prepped, sterile technique used/blood seen on insertion/dressing applied/flushes easily/secured in place/sterile technique, catheter placed
patient pre-oxygenated, tube inserted, placement confirmed
dynamic US guidance/blood seen on insertion/dressing applied/flushes easily/secured in place
dynamic US guidance/blood seen on insertion/dressing applied/flushes easily/secured in place

## 2024-04-16 NOTE — CONSULT NOTE ADULT - TIME BILLING
review of chart, records, blood work, vitals, medications, imaging, direct patient care, stabilizing patient prior to transfer to ICU, discussion with medicine/ICU/family

## 2024-04-16 NOTE — RAPID RESPONSE TEAM SUMMARY - NSSITUATIONBACKGROUNDRRT_GEN_ALL_CORE
73-year-old male with a PMH of  DM, HTN, Seizure, TBI, COPD CHF cardiac arrest BPH Presents to the ED from [facility] for Dyspnea. At baseline, patient uses BiPap intermittently throughout the rohan, patient had an episode of vomiting while on the Bipap.   Now with hypoxia sat 40's. Pt found with water bottle in hand likely aspirated again. Placed on nonrebreather mask and suctioned with nasophargeal suctioning, with improvement in O2 to 100%.  BP elevated 240/80  Pt

## 2024-04-16 NOTE — PROGRESS NOTE ADULT - ASSESSMENT
A/P    73-year-old male with a PMH of  DM, HTN, Seizure, TBI, COPD CHF cardiac arrest BPH Presents to the ED from [facility] for Dyspnea. At baseline, patient uses BiPap intermittently throughout the rohan, patient had an episode of vomiting while on the Bipap. Then continued to have SOB for about an hour, sent to the ED for further evaluation. Upon ED arrival patient was tachypneic with increase work of breathing, placed on Bipap. Upon evaluation, patient is AAOx3, No distress, able to communicate full sentence (at his own pace due to CVA residual). Labs remarkable for WBC:15.30, K:5.9, troponin 277, ECG-NSR no T-wave or ST changes, BP:146/83, HR: 83 Temp: 100.9. Received Zosyn, Tylenol, Kayexalate, Humulin 5U, Albuterol.       Problem/Plan - 1:  ·  Problem: Pneumonia, aspiration.   ·  Plan: Acute respiratory distress upon ED arrival, now on room air  - Zosyn   - Duoneb   - F/U Culture   - Monitor resp status  - DVT prophylaxis (Eliquis).     Problem/Plan - 2:  ·  Problem: Elevated troponin.   ·  Plan: Troponin 277 - NO ST Changes on ECG   Most likely demand   - Tele   - Aspirin   - Trend Troponin   - ECG.     Problem/Plan - 3:  ·  Problem: Hyperkalemia.   ·  Plan: Improved     Problem/Plan - 4:  ·  Problem: Hypertension.   ·  Plan: Normotensive   Continue Home meds   - Metoprolol Tart.     Problem/Plan - 5:  ·  Problem: Seizure.   ·  Plan: Continue home meds   - Keppra 1000mg  - Valproic 750mg  - Primidone 100mg   - Seizure precaution.     Problem/Plan - 6:  ·  Problem: Diabetes mellitus.   ·  Plan: ISS with hypoglycemia protocol     Dispo: DC ready for [facility]      Nahun Coyne MD

## 2024-04-16 NOTE — PROGRESS NOTE ADULT - ASSESSMENT
74 yo m pmhx Dm2, HTN, seizure, TBI, COPD, CHF, cardiac arrest, BPH admitted from [facility] with aspiration with rrt called 4/16 for acute on chronic aspiration/hypoxic resp failure requiring intubation.      NEURO: sedated on fent and prop  CV: Close HD monitoring  RESP: Hypoxic respiratory failure, ac/vc 4-6 cc/kg tv lung protective strategies, actively titrating fio2/peep for spo2 >92%. chest pt, pulm lavage and suctioning as tolerated  RENAL: Monitor urine output, lytes. replace lytes as needed.    GI: NPO   ENDO: ISS for glycemic control   ID: zosyn for coverage   HEME: Eliquis for ac  DISPO: full code     Critical Care time: 35 mins assessing presenting problems of acute illness that poses high probability of life threatening deterioration or end organ damage/dysfunction.  Medical decision making including Initiating plan of care, reviewing data, reviewing radiology, discussing with multidisciplinary team, non inclusive of procedures, discussing goals of care with patient/family    DATE OF DOCUMENTATION EQUIVALENT TO DATE OF SERVICES RENDERED

## 2024-04-16 NOTE — PROCEDURE NOTE - NSINFORMCONSENT_GEN_A_CORE
no iv access being intubated/This was an emergent procedure.
Benefits, risks, and possible complications of procedure explained to patient/caregiver who verbalized understanding and gave verbal consent.
Benefits, risks, and possible complications of procedure explained to patient/caregiver who verbalized understanding and gave verbal consent.
This was an emergent procedure.

## 2024-04-16 NOTE — CONSULT NOTE ADULT - ASSESSMENT
73M PMH CAD s/p PCI with stent 2015 and CABG 2014, chronic diastolic heart failure (EF 50%), 2nd degree AV block s/p medtronic PPM, HTN, COPD, DM, CKD 3, and CVA (lacunar infarct), s/p prolonged hospitalization 11/2022 - 3/2023 initially admitted to ICU 11/14/2022 for AMS, hypoglycemia with PEA arrest in ED with ROSC obtained in approximately 5 min.  Post arrest noted to have generalized tonic-clonic seizures confirmed on EEG. Started on antiepileptics. Hospital course complicated by failure to wean s/p trach and peg 12/5/22, left upper extremity DVT initiated on eliquis, fevers with multiple infections due to enterococcal faecalis cellulitis, infected left hand hematoma s/p debridement 12/13, citrobacter and enterobacter cloacae tracheobronchitis, ecoli UTI. Trach changed and downsized by surgery on 1/30/23  to #4 fenestrated cuffed trach. Decannulated 3/7/23 discharged to rehab. Had had subsequent hospitalizations for hypotension in setting of starting entresto, sacral wound, chest pain, CHF/COPD exacerbation. Presents from [facility] rehab this time 4/14/24 after vomiting into Bipap mask with subsequent wheezing/SOB. Sent to ER for eval. In ED with increased work of breathing and was placed on BiPAP and admitted to medical service for aspiration PNA. Started on zosyn. RRT today 4/16 for hypoxia, AMS, respiratory distress. Blood gas pH 7.1, pCO2: 88.    DX: acute hypoxic and hypercarbic respiratory failure, aspiration PNA, acute metabolic encephalopathy     - AMS likely in setting of hypercarbia  - will defer BiPAP as pt obtunded, unable to protect airway, and with suspicion of aspiration  - discussed with ICU team: pt to be transferred to ICU for intubation  - goal to maintain O2 sat > 90%  - currently on non rebreather for O2 supplementation  - wean down Fio2 as tolerates  - s/p duonebs x1  - CXR at RRT with ?bibasilar hazy opacity (my read) - follow up official CXR read  - cont continue with zosyn for now  - check sputum cx  - son arrived at bedside: updated and informed of events  - d/w ICU team and medicine PA  - ICU team to assume care of patient

## 2024-04-16 NOTE — PROCEDURE NOTE - ADDITIONAL PROCEDURE DETAILS
PIV placed in setting of hypoxic respiratory failure, aspiration  Not included in cc time  tourniquet applied, vessel identified under ultrasound guidance, site cleaned/draped, IV placed, brisk blood return/flushes well, site dressed and secured with tegaderm/tape.

## 2024-04-16 NOTE — CONSULT NOTE ADULT - SUBJECTIVE AND OBJECTIVE BOX
ICU CONSULT  ===============  HPI:  73-year-old male with a PMH of  DM, HTN, Seizure, TBI, COPD CHF cardiac arrest BPH Presents to the ED from [facility] for Dyspnea. At baseline, patient uses BiPap intermittently throughout the rohan, patient had an episode of vomiting while on the Bipap. Then continued to have SOB for about an hour, sent to the ED for further evaluation. Upon ED arrival patient was tachypneic with increase work of breathing, placed on Bipap. Upon evaluation, patient is AAOx3, No distress, able to communicate full sentence (at his own pace due to CVA residual). Labs remarkable for WBC:15.30, K:5.9, troponin 277, ECG-NSR no T-wave or ST changes, BP:146/83, HR: 83 Temp: 100.9. Received Zosyn, Tylenol, Kayexalate, Humulin 5U, Albuterol. (14 Apr 2024 07:04)      INTERVAL HPI:   RRT today for hypoxia, patient found with water bottle in hand and quickly became cyanotic. Placed on NRB & Patient transferred to ICU and emergently intubated for hypoxic respiratory failure in setting of likely aspiration event.       Allergies    No Known Allergies    Intolerances      Home Medications:  apixaban 5 mg oral tablet: 1 tab(s) orally every 12 hours (14 Apr 2024 06:53)  aspirin 81 mg oral delayed release tablet: 1 tab(s) orally once a day (14 Apr 2024 06:53)  atorvastatin 80 mg oral tablet: 1 tab(s) orally once a day (at bedtime) (14 Apr 2024 06:53)  cyanocobalamin 1000 mcg oral tablet: 1 tab(s) orally once a day (14 Apr 2024 06:53)  insulin glargine 100 units/mL subcutaneous solution: 20 unit(s) subcutaneous once a day (at bedtime) (14 Apr 2024 06:53)  insulin lispro 100 units/mL injectable solution: 1 unit(s) injectable 3 times a day 2 Unit(s) if Glucose 151 - 200  4 Unit(s) if Glucose 201 - 250  6 Unit(s) if Glucose 251 - 300  8 Unit(s) if Glucose 301 - 350  10 Unit(s) if Glucose 351 - 400  12 Unit(s) if Glucose Greater Than 400 (14 Apr 2024 06:53)  ipratropium-albuterol 0.5 mg-2.5 mg/3 mL inhalation solution: 3 milliliter(s) inhaled every 6 hours As needed Shortness of Breath and/or Wheezing (14 Apr 2024 06:53)  Lasix 20 mg oral tablet: 1 tab(s) orally once a day (at bedtime) (14 Apr 2024 06:53)  levETIRAcetam 1000 mg oral tablet: 1 tab(s) orally 2 times a day (14 Apr 2024 06:53)  Liquifilm Tears preserved ophthalmic solution: 1 drop(s) to each affected eye 2 times a day (14 Apr 2024 06:53)  metoprolol tartrate 25 mg oral tablet: 1 tab(s) orally 2 times a day (14 Apr 2024 06:53)  Multiple Vitamins with Minerals oral liquid: 1  orally once a day (14 Apr 2024 06:53)  polyethylene glycol 3350 oral powder for reconstitution: 17 gram(s) orally 2 times a day (14 Apr 2024 06:53)  primidone 50 mg oral tablet: 2 tab(s) orally once a day (at bedtime) (14 Apr 2024 06:53)  senna leaf extract oral tablet: 2 tab(s) orally once a day (at bedtime) (14 Apr 2024 06:53)  tamsulosin 0.4 mg oral capsule: 1 cap(s) orally once a day (at bedtime) (14 Apr 2024 06:53)  tiotropium 2.5 mcg/inh inhalation aerosol: 2 puff(s) inhaled once a day (14 Apr 2024 06:53)  valproic acid 250 mg oral capsule: 3 cap(s) orally once a day (14 Apr 2024 06:53)  valproic acid 250 mg oral capsule: 4 cap(s) orally once a day (at bedtime) (14 Apr 2024 06:53)      SOCIAL HX:     unable to obtain at this time, intubated and sedated    PAST MEDICAL & SURGICAL HISTORY:  HTN (hypertension)      HLD (hyperlipidemia)      Diabetes mellitus      Lacunar infarction      BPH (benign prostatic hyperplasia)      CHF (congestive heart failure)      Chronic obstructive pulmonary disease, unspecified COPD type      BPH without urinary obstruction      History of chronic ischemic heart disease      Cardiac pacemaker      Cardiac arrest      Seizures      TBI (traumatic brain injury)      S/P CABG (coronary artery bypass graft)  2014          FAMILY HISTORY:  No pertinent family history in first degree relatives    :    No known cardiovascular or pulmonary family history     ROS:  See HPI     PHYSICAL EXAM  ============    ICU Vital Signs Last 24 Hrs  T(C): 37.1 (16 Apr 2024 10:33), Max: 37.1 (16 Apr 2024 10:33)  T(F): 98.8 (16 Apr 2024 10:33), Max: 98.8 (16 Apr 2024 10:33)  HR: 79 (16 Apr 2024 10:33) (70 - 79)  BP: 174/82 (16 Apr 2024 10:33) (148/72 - 174/82)  BP(mean): --  ABP: --  ABP(mean): --  RR: 16 (16 Apr 2024 10:33) (16 - 18)  SpO2: 95% (16 Apr 2024 10:33) (95% - 100%)    O2 Parameters below as of 16 Apr 2024 04:37  Patient On (Oxygen Delivery Method): BiPAP/CPAP        PHYSICAL EXAM:    GENERAL: NAD, lying in bed intubated/sedated  HEAD:  Atraumatic, normocephalic  EYES: conjunctiva and sclera clear  NECK: Supple, trachea midline, no JVD  HEART: Regular rate and rhythm, no murmurs, rubs, or gallops  LUNGS: Unlabored respirations.  Clear to auscultation bilaterally, no crackles, wheezing, or rhonchi  ABDOMEN: Soft, nontender, nondistended,   EXTREMITIES: 2+ peripheral pulses bilaterally. No clubbing, cyanosis, or edema  NERVOUS SYSTEM:   sedated, unable to perform accurate neurological exam at this time   SKIN: No rashes or lesions         LABS:                          13.2   9.64  )-----------( 175      ( 16 Apr 2024 07:40 )             41.1      04-15    142  |  106  |  38<H>  ----------------------------<  230<H>  3.7   |  27  |  1.13    Ca    8.2<L>      15 Apr 2024 05:25                                                                                                                                      Urinalysis Basic - ( 15 Apr 2024 05:25 )    Color: x / Appearance: x / SG: x / pH: x  Gluc: 230 mg/dL / Ketone: x  / Bili: x / Urobili: x   Blood: x / Protein: x / Nitrite: x   Leuk Esterase: x / RBC: x / WBC x   Sq Epi: x / Non Sq Epi: x / Bacteria: x                                          ABG - ( 16 Apr 2024 13:55 )  pH, Arterial: 7.15  pH, Blood: x     /  pCO2: 88    /  pO2: 224   / HCO3: 31    / Base Excess: -1.2  /  SaO2: 97.7        MEDICATIONS  (STANDING):  apixaban 5 milliGRAM(s) Oral every 12 hours  aspirin enteric coated 81 milliGRAM(s) Oral daily  atorvastatin 80 milliGRAM(s) Oral at bedtime  chlorhexidine 0.12% Liquid 15 milliLiter(s) Oral Mucosa every 12 hours  chlorhexidine 2% Cloths 1 Application(s) Topical <User Schedule>  insulin lispro (ADMELOG) corrective regimen sliding scale   SubCutaneous every 6 hours  levETIRAcetam 1000 milliGRAM(s) Oral two times a day  metoprolol tartrate 25 milliGRAM(s) Oral two times a day  norepinephrine Infusion 0.05 MICROgram(s)/kG/Min (11.9 mL/Hr) IV Continuous <Continuous>  piperacillin/tazobactam IVPB.. 3.375 Gram(s) IV Intermittent every 8 hours  propofol Infusion 10 MICROgram(s)/kG/Min (7.59 mL/Hr) IV Continuous <Continuous>  tamsulosin 0.4 milliGRAM(s) Oral at bedtime  valproic acid 750 milliGRAM(s) Oral daily    MEDICATIONS  (PRN):  acetaminophen     Tablet .. 650 milliGRAM(s) Oral every 6 hours PRN Temp greater or equal to 38C (100.4F), Mild Pain (1 - 3)  albuterol    90 MICROgram(s) HFA Inhaler 2 Puff(s) Inhalation every 6 hours PRN Shortness of Breath and/or Wheezing  ondansetron Injectable 4 milliGRAM(s) IV Push every 8 hours PRN Nausea and/or Vomiting           ICU CONSULT  ===============  HPI:  73-year-old male with a PMH of  DM, HTN, Seizure, TBI, COPD CHF cardiac arrest BPH Presents to the ED from [facility] for Dyspnea. At baseline, patient uses BiPap intermittently throughout the rohan, patient had an episode of vomiting while on the Bipap. Then continued to have SOB for about an hour, sent to the ED for further evaluation. Upon ED arrival patient was tachypneic with increase work of breathing, placed on Bipap. Upon evaluation, patient is AAOx3, No distress, able to communicate full sentence (at his own pace due to CVA residual). Labs remarkable for WBC:15.30, K:5.9, troponin 277, ECG-NSR no T-wave or ST changes, BP:146/83, HR: 83 Temp: 100.9. Received Zosyn, Tylenol, Kayexalate, Humulin 5U, Albuterol. (14 Apr 2024 07:04)      INTERVAL HPI:   RRT today for hypoxia, patient found with water bottle in hand and quickly became cyanotic. Placed on NRB & Patient transferred to ICU and emergently intubated for hypoxic respiratory failure in setting of likely aspiration event.       Allergies    No Known Allergies    Intolerances      Home Medications:  apixaban 5 mg oral tablet: 1 tab(s) orally every 12 hours (14 Apr 2024 06:53)  aspirin 81 mg oral delayed release tablet: 1 tab(s) orally once a day (14 Apr 2024 06:53)  atorvastatin 80 mg oral tablet: 1 tab(s) orally once a day (at bedtime) (14 Apr 2024 06:53)  cyanocobalamin 1000 mcg oral tablet: 1 tab(s) orally once a day (14 Apr 2024 06:53)  insulin glargine 100 units/mL subcutaneous solution: 20 unit(s) subcutaneous once a day (at bedtime) (14 Apr 2024 06:53)  insulin lispro 100 units/mL injectable solution: 1 unit(s) injectable 3 times a day 2 Unit(s) if Glucose 151 - 200  4 Unit(s) if Glucose 201 - 250  6 Unit(s) if Glucose 251 - 300  8 Unit(s) if Glucose 301 - 350  10 Unit(s) if Glucose 351 - 400  12 Unit(s) if Glucose Greater Than 400 (14 Apr 2024 06:53)  ipratropium-albuterol 0.5 mg-2.5 mg/3 mL inhalation solution: 3 milliliter(s) inhaled every 6 hours As needed Shortness of Breath and/or Wheezing (14 Apr 2024 06:53)  Lasix 20 mg oral tablet: 1 tab(s) orally once a day (at bedtime) (14 Apr 2024 06:53)  levETIRAcetam 1000 mg oral tablet: 1 tab(s) orally 2 times a day (14 Apr 2024 06:53)  Liquifilm Tears preserved ophthalmic solution: 1 drop(s) to each affected eye 2 times a day (14 Apr 2024 06:53)  metoprolol tartrate 25 mg oral tablet: 1 tab(s) orally 2 times a day (14 Apr 2024 06:53)  Multiple Vitamins with Minerals oral liquid: 1  orally once a day (14 Apr 2024 06:53)  polyethylene glycol 3350 oral powder for reconstitution: 17 gram(s) orally 2 times a day (14 Apr 2024 06:53)  primidone 50 mg oral tablet: 2 tab(s) orally once a day (at bedtime) (14 Apr 2024 06:53)  senna leaf extract oral tablet: 2 tab(s) orally once a day (at bedtime) (14 Apr 2024 06:53)  tamsulosin 0.4 mg oral capsule: 1 cap(s) orally once a day (at bedtime) (14 Apr 2024 06:53)  tiotropium 2.5 mcg/inh inhalation aerosol: 2 puff(s) inhaled once a day (14 Apr 2024 06:53)  valproic acid 250 mg oral capsule: 3 cap(s) orally once a day (14 Apr 2024 06:53)  valproic acid 250 mg oral capsule: 4 cap(s) orally once a day (at bedtime) (14 Apr 2024 06:53)      SOCIAL HX:     unable to obtain at this time, intubated and sedated    PAST MEDICAL & SURGICAL HISTORY:  HTN (hypertension)      HLD (hyperlipidemia)      Diabetes mellitus      Lacunar infarction      BPH (benign prostatic hyperplasia)      CHF (congestive heart failure)      Chronic obstructive pulmonary disease, unspecified COPD type      BPH without urinary obstruction      History of chronic ischemic heart disease      Cardiac pacemaker      Cardiac arrest      Seizures      TBI (traumatic brain injury)      S/P CABG (coronary artery bypass graft)  2014          FAMILY HISTORY:  No pertinent family history in first degree relatives    :    No known cardiovascular or pulmonary family history     ROS:  See HPI       ICU Vital Signs Last 24 Hrs  T(C): 37.1 (16 Apr 2024 10:33), Max: 37.1 (16 Apr 2024 10:33)  T(F): 98.8 (16 Apr 2024 10:33), Max: 98.8 (16 Apr 2024 10:33)  HR: 79 (16 Apr 2024 10:33) (70 - 79)  BP: 174/82 (16 Apr 2024 10:33) (148/72 - 174/82)  BP(mean): --  ABP: --  ABP(mean): --  RR: 16 (16 Apr 2024 10:33) (16 - 18)  SpO2: 95% (16 Apr 2024 10:33) (95% - 100%)    O2 Parameters below as of 16 Apr 2024 04:37  Patient On (Oxygen Delivery Method): BiPAP/CPAP        PHYSICAL EXAM:    GENERAL: NAD, lying in bed intubated/sedated  HEAD:  Atraumatic, normocephalic  EYES: conjunctiva and sclera clear  NECK: Supple, trachea midline, no JVD  HEART: Regular rate and rhythm, no murmurs, rubs, or gallops  LUNGS: intubated, mechanically breath sounds bilaterally.   ABDOMEN: Soft, nontender, nondistended,   EXTREMITIES: No clubbing, cyanosis, or edema  NERVOUS SYSTEM:   sedated, unable to perform accurate neurological exam at this time           LABS:                          13.2   9.64  )-----------( 175      ( 16 Apr 2024 07:40 )             41.1      04-15    142  |  106  |  38<H>  ----------------------------<  230<H>  3.7   |  27  |  1.13    Ca    8.2<L>      15 Apr 2024 05:25                                                                                                                                      Urinalysis Basic - ( 15 Apr 2024 05:25 )    Color: x / Appearance: x / SG: x / pH: x  Gluc: 230 mg/dL / Ketone: x  / Bili: x / Urobili: x   Blood: x / Protein: x / Nitrite: x   Leuk Esterase: x / RBC: x / WBC x   Sq Epi: x / Non Sq Epi: x / Bacteria: x                                          ABG - ( 16 Apr 2024 13:55 )  pH, Arterial: 7.15  pH, Blood: x     /  pCO2: 88    /  pO2: 224   / HCO3: 31    / Base Excess: -1.2  /  SaO2: 97.7        MEDICATIONS  (STANDING):  apixaban 5 milliGRAM(s) Oral every 12 hours  aspirin enteric coated 81 milliGRAM(s) Oral daily  atorvastatin 80 milliGRAM(s) Oral at bedtime  chlorhexidine 0.12% Liquid 15 milliLiter(s) Oral Mucosa every 12 hours  chlorhexidine 2% Cloths 1 Application(s) Topical <User Schedule>  insulin lispro (ADMELOG) corrective regimen sliding scale   SubCutaneous every 6 hours  levETIRAcetam 1000 milliGRAM(s) Oral two times a day  metoprolol tartrate 25 milliGRAM(s) Oral two times a day  norepinephrine Infusion 0.05 MICROgram(s)/kG/Min (11.9 mL/Hr) IV Continuous <Continuous>  piperacillin/tazobactam IVPB.. 3.375 Gram(s) IV Intermittent every 8 hours  propofol Infusion 10 MICROgram(s)/kG/Min (7.59 mL/Hr) IV Continuous <Continuous>  tamsulosin 0.4 milliGRAM(s) Oral at bedtime  valproic acid 750 milliGRAM(s) Oral daily    MEDICATIONS  (PRN):  acetaminophen     Tablet .. 650 milliGRAM(s) Oral every 6 hours PRN Temp greater or equal to 38C (100.4F), Mild Pain (1 - 3)  albuterol    90 MICROgram(s) HFA Inhaler 2 Puff(s) Inhalation every 6 hours PRN Shortness of Breath and/or Wheezing  ondansetron Injectable 4 milliGRAM(s) IV Push every 8 hours PRN Nausea and/or Vomiting           ICU CONSULT  ===============  HPI:  73-year-old male with a PMH of  DM, HTN, Seizure, TBI, COPD CHF cardiac arrest BPH Presents to the ED from [facility] for Dyspnea. At baseline, patient uses BiPap intermittently throughout the rohan, patient had an episode of vomiting while on the Bipap. Then continued to have SOB for about an hour, sent to the ED for further evaluation. Upon ED arrival patient was tachypneic with increase work of breathing, placed on Bipap. Upon evaluation, patient is AAOx3, No distress, able to communicate full sentence (at his own pace due to CVA residual). Labs remarkable for WBC:15.30, K:5.9, troponin 277, ECG-NSR no T-wave or ST changes, BP:146/83, HR: 83 Temp: 100.9. Received Zosyn, Tylenol, Kayexalate, Humulin 5U, Albuterol. (14 Apr 2024 07:04)      INTERVAL HPI:   RRT today for hypoxia, patient found lethargic with water bottle in hand and with Spo2 60% and became cyanotic. Placed on NRB & Patient transferred to ICU and emergently intubated for hypoxic respiratory failure in setting of likely aspiration event.       Allergies    No Known Allergies    Intolerances      Home Medications:  apixaban 5 mg oral tablet: 1 tab(s) orally every 12 hours (14 Apr 2024 06:53)  aspirin 81 mg oral delayed release tablet: 1 tab(s) orally once a day (14 Apr 2024 06:53)  atorvastatin 80 mg oral tablet: 1 tab(s) orally once a day (at bedtime) (14 Apr 2024 06:53)  cyanocobalamin 1000 mcg oral tablet: 1 tab(s) orally once a day (14 Apr 2024 06:53)  insulin glargine 100 units/mL subcutaneous solution: 20 unit(s) subcutaneous once a day (at bedtime) (14 Apr 2024 06:53)  insulin lispro 100 units/mL injectable solution: 1 unit(s) injectable 3 times a day 2 Unit(s) if Glucose 151 - 200  4 Unit(s) if Glucose 201 - 250  6 Unit(s) if Glucose 251 - 300  8 Unit(s) if Glucose 301 - 350  10 Unit(s) if Glucose 351 - 400  12 Unit(s) if Glucose Greater Than 400 (14 Apr 2024 06:53)  ipratropium-albuterol 0.5 mg-2.5 mg/3 mL inhalation solution: 3 milliliter(s) inhaled every 6 hours As needed Shortness of Breath and/or Wheezing (14 Apr 2024 06:53)  Lasix 20 mg oral tablet: 1 tab(s) orally once a day (at bedtime) (14 Apr 2024 06:53)  levETIRAcetam 1000 mg oral tablet: 1 tab(s) orally 2 times a day (14 Apr 2024 06:53)  Liquifilm Tears preserved ophthalmic solution: 1 drop(s) to each affected eye 2 times a day (14 Apr 2024 06:53)  metoprolol tartrate 25 mg oral tablet: 1 tab(s) orally 2 times a day (14 Apr 2024 06:53)  Multiple Vitamins with Minerals oral liquid: 1  orally once a day (14 Apr 2024 06:53)  polyethylene glycol 3350 oral powder for reconstitution: 17 gram(s) orally 2 times a day (14 Apr 2024 06:53)  primidone 50 mg oral tablet: 2 tab(s) orally once a day (at bedtime) (14 Apr 2024 06:53)  senna leaf extract oral tablet: 2 tab(s) orally once a day (at bedtime) (14 Apr 2024 06:53)  tamsulosin 0.4 mg oral capsule: 1 cap(s) orally once a day (at bedtime) (14 Apr 2024 06:53)  tiotropium 2.5 mcg/inh inhalation aerosol: 2 puff(s) inhaled once a day (14 Apr 2024 06:53)  valproic acid 250 mg oral capsule: 3 cap(s) orally once a day (14 Apr 2024 06:53)  valproic acid 250 mg oral capsule: 4 cap(s) orally once a day (at bedtime) (14 Apr 2024 06:53)      SOCIAL HX:     unable to obtain at this time, intubated and sedated    PAST MEDICAL & SURGICAL HISTORY:  HTN (hypertension)      HLD (hyperlipidemia)      Diabetes mellitus      Lacunar infarction      BPH (benign prostatic hyperplasia)      CHF (congestive heart failure)      Chronic obstructive pulmonary disease, unspecified COPD type      BPH without urinary obstruction      History of chronic ischemic heart disease      Cardiac pacemaker      Cardiac arrest      Seizures      TBI (traumatic brain injury)      S/P CABG (coronary artery bypass graft)  2014          FAMILY HISTORY:  No pertinent family history in first degree relatives    :    No known cardiovascular or pulmonary family history     ROS:  See HPI       ICU Vital Signs Last 24 Hrs  T(C): 37.1 (16 Apr 2024 10:33), Max: 37.1 (16 Apr 2024 10:33)  T(F): 98.8 (16 Apr 2024 10:33), Max: 98.8 (16 Apr 2024 10:33)  HR: 79 (16 Apr 2024 10:33) (70 - 79)  BP: 174/82 (16 Apr 2024 10:33) (148/72 - 174/82)  BP(mean): --  ABP: --  ABP(mean): --  RR: 16 (16 Apr 2024 10:33) (16 - 18)  SpO2: 95% (16 Apr 2024 10:33) (95% - 100%)    O2 Parameters below as of 16 Apr 2024 04:37  Patient On (Oxygen Delivery Method): BiPAP/CPAP        PHYSICAL EXAM:    GENERAL: NAD, lying in bed intubated/sedated  HEAD:  Atraumatic, normocephalic  EYES: conjunctiva and sclera clear  NECK: Supple, trachea midline, no JVD  HEART: Regular rate and rhythm, no murmurs, rubs, or gallops  LUNGS: intubated, mechanically breath sounds bilaterally, b/l rhonchi.   ABDOMEN: Soft, nontender, nondistended,   EXTREMITIES: No clubbing, cyanosis. Mild LE edema b/l.   NERVOUS SYSTEM:   sedated, unable to perform accurate neurological exam at this time           LABS:                          13.2   9.64  )-----------( 175      ( 16 Apr 2024 07:40 )             41.1      04-15    142  |  106  |  38<H>  ----------------------------<  230<H>  3.7   |  27  |  1.13    Ca    8.2<L>      15 Apr 2024 05:25                                                                                                                                      Urinalysis Basic - ( 15 Apr 2024 05:25 )    Color: x / Appearance: x / SG: x / pH: x  Gluc: 230 mg/dL / Ketone: x  / Bili: x / Urobili: x   Blood: x / Protein: x / Nitrite: x   Leuk Esterase: x / RBC: x / WBC x   Sq Epi: x / Non Sq Epi: x / Bacteria: x                                          ABG - ( 16 Apr 2024 13:55 )  pH, Arterial: 7.15  pH, Blood: x     /  pCO2: 88    /  pO2: 224   / HCO3: 31    / Base Excess: -1.2  /  SaO2: 97.7        MEDICATIONS  (STANDING):  apixaban 5 milliGRAM(s) Oral every 12 hours  aspirin enteric coated 81 milliGRAM(s) Oral daily  atorvastatin 80 milliGRAM(s) Oral at bedtime  chlorhexidine 0.12% Liquid 15 milliLiter(s) Oral Mucosa every 12 hours  chlorhexidine 2% Cloths 1 Application(s) Topical <User Schedule>  insulin lispro (ADMELOG) corrective regimen sliding scale   SubCutaneous every 6 hours  levETIRAcetam 1000 milliGRAM(s) Oral two times a day  metoprolol tartrate 25 milliGRAM(s) Oral two times a day  norepinephrine Infusion 0.05 MICROgram(s)/kG/Min (11.9 mL/Hr) IV Continuous <Continuous>  piperacillin/tazobactam IVPB.. 3.375 Gram(s) IV Intermittent every 8 hours  propofol Infusion 10 MICROgram(s)/kG/Min (7.59 mL/Hr) IV Continuous <Continuous>  tamsulosin 0.4 milliGRAM(s) Oral at bedtime  valproic acid 750 milliGRAM(s) Oral daily    MEDICATIONS  (PRN):  acetaminophen     Tablet .. 650 milliGRAM(s) Oral every 6 hours PRN Temp greater or equal to 38C (100.4F), Mild Pain (1 - 3)  albuterol    90 MICROgram(s) HFA Inhaler 2 Puff(s) Inhalation every 6 hours PRN Shortness of Breath and/or Wheezing  ondansetron Injectable 4 milliGRAM(s) IV Push every 8 hours PRN Nausea and/or Vomiting

## 2024-04-16 NOTE — RAPID RESPONSE TEAM SUMMARY - [id number]_GEN_ALL_CORE
Hypoxia improved with suctioning and nebulizer treatment however pt still in respiratory distress using accessory muscle while labored breathing.   Poor breath sounds b/l.   STAT ABG  STAT CXR  STAT CBC/CMP

## 2024-04-16 NOTE — CONSULT NOTE ADULT - ASSESSMENT
73-year-old male with a PMH of  DM, HTN, Seizure, TBI, COPD CHF cardiac arrest BPH admitted to [facility] for aspiration PNA. Patient admitted to ICU for hypoxic respiratory failure in setting of witnessed aspiration event on GMF.       # Neuro:  - sedated with propofol  - daily SATs    #Resp:  //Acute Hypoxic Respiratory Failure - 2/2 to likely aspiration event   - intubated emergently in ICU; minimal settings 30%/5  - FU post intubation ABG   - daily SBTs  - Patient has history of COPD with pre intubation gas consistent with chronic hypercapnic respiratory failure     #CV:  - No vasopressors at this time, maintaining MAP >65     #GI:  -Diet: NPO   - NGT to be placed & FU CXr     #Renal:  - voiding freely, FU urine output   - CMP pending, will FU kidney function   - replete lytes as appropriate     #ID:  - afebrile, wbc mildly elevated   - Will continue Zosyn for now      #Heme:  - Continue home eliquis for AF     #Endo:  - maintaining goal glucose<180 with ISS  - cont to monitor FS    Discussed with ICU attending Mo.  73-year-old male with a PMH of  DM, HTN, Seizure, TBI, COPD CHF cardiac arrest BPH admitted to [facility] for aspiration PNA. Patient admitted to ICU for hypoxic respiratory failure in setting of witnessed aspiration event on GMF.       # Neuro:  - sedated with propofol/fent  - daily SATs    #Resp:  //Acute on Chronic Hypercapnic/Hypoxic Respiratory Failure - 2/2 to likely aspiration event   - intubated emergently in ICU; minimal settings 30%/5  - FU post intubation ABG   - daily SBTs  - Patient has history of COPD with pre intubation gas consistent with chronic hypercapnic respiratory failure.     #CV:  - No vasopressors at this time, maintaining MAP >65     #GI:  -Diet: NPO   - NGT to be placed & FU CXr     #Renal:  - voiding freely, FU urine output   - CMP pending, will FU kidney function   - replete lytes as appropriate     #ID:  - afebrile, wbc mildly elevated   - Will continue Zosyn for now      #Heme:  - Continue home eliquis for AF     #Endo:  - maintaining goal glucose<180 with ISS  - cont to monitor FS    Discussed with ICU attending Mo.

## 2024-04-17 LAB
ALBUMIN SERPL ELPH-MCNC: 1.8 G/DL — LOW (ref 3.3–5)
ALLERGY+IMMUNOLOGY DIAG STUDY NOTE: SIGNIFICANT CHANGE UP
ALP SERPL-CCNC: 63 U/L — SIGNIFICANT CHANGE UP (ref 40–120)
ALT FLD-CCNC: 17 U/L — SIGNIFICANT CHANGE UP (ref 12–78)
ANION GAP SERPL CALC-SCNC: 8 MMOL/L — SIGNIFICANT CHANGE UP (ref 5–17)
APTT BLD: 27.9 SEC — SIGNIFICANT CHANGE UP (ref 24.5–35.6)
AST SERPL-CCNC: 22 U/L — SIGNIFICANT CHANGE UP (ref 15–37)
BILIRUB SERPL-MCNC: 0.3 MG/DL — SIGNIFICANT CHANGE UP (ref 0.2–1.2)
BUN SERPL-MCNC: 24 MG/DL — HIGH (ref 7–23)
CALCIUM SERPL-MCNC: 8.2 MG/DL — LOW (ref 8.5–10.1)
CHLORIDE SERPL-SCNC: 110 MMOL/L — HIGH (ref 96–108)
CO2 SERPL-SCNC: 24 MMOL/L — SIGNIFICANT CHANGE UP (ref 22–31)
CREAT SERPL-MCNC: 0.88 MG/DL — SIGNIFICANT CHANGE UP (ref 0.5–1.3)
EGFR: 90 ML/MIN/1.73M2 — SIGNIFICANT CHANGE UP
FIBRINOGEN PPP-MCNC: 483 MG/DL — HIGH (ref 200–480)
GAS PNL BLDA: SIGNIFICANT CHANGE UP
GAS PNL BLDA: SIGNIFICANT CHANGE UP
GLUCOSE BLDC GLUCOMTR-MCNC: 160 MG/DL — HIGH (ref 70–99)
GLUCOSE BLDC GLUCOMTR-MCNC: 168 MG/DL — HIGH (ref 70–99)
GLUCOSE BLDC GLUCOMTR-MCNC: 183 MG/DL — HIGH (ref 70–99)
GLUCOSE BLDC GLUCOMTR-MCNC: 222 MG/DL — HIGH (ref 70–99)
GLUCOSE BLDC GLUCOMTR-MCNC: 254 MG/DL — HIGH (ref 70–99)
GLUCOSE SERPL-MCNC: 210 MG/DL — HIGH (ref 70–99)
GRAM STN FLD: SIGNIFICANT CHANGE UP
HCT VFR BLD CALC: 37.4 % — LOW (ref 39–50)
HGB BLD-MCNC: 12.4 G/DL — LOW (ref 13–17)
INR BLD: 0.84 RATIO — LOW (ref 0.85–1.18)
LEGIONELLA AG UR QL: NEGATIVE — SIGNIFICANT CHANGE UP
MAGNESIUM SERPL-MCNC: 2.1 MG/DL — SIGNIFICANT CHANGE UP (ref 1.6–2.6)
MCHC RBC-ENTMCNC: 30.6 PG — SIGNIFICANT CHANGE UP (ref 27–34)
MCHC RBC-ENTMCNC: 33.2 G/DL — SIGNIFICANT CHANGE UP (ref 32–36)
MCV RBC AUTO: 92.3 FL — SIGNIFICANT CHANGE UP (ref 80–100)
MRSA PCR RESULT.: SIGNIFICANT CHANGE UP
NRBC # BLD: 0 /100 WBCS — SIGNIFICANT CHANGE UP (ref 0–0)
PHOSPHATE SERPL-MCNC: 1.6 MG/DL — LOW (ref 2.5–4.5)
PLATELET # BLD AUTO: 183 K/UL — SIGNIFICANT CHANGE UP (ref 150–400)
POTASSIUM SERPL-MCNC: 3.5 MMOL/L — SIGNIFICANT CHANGE UP (ref 3.5–5.3)
POTASSIUM SERPL-SCNC: 3.5 MMOL/L — SIGNIFICANT CHANGE UP (ref 3.5–5.3)
PROT SERPL-MCNC: 5.9 GM/DL — LOW (ref 6–8.3)
PROTHROM AB SERPL-ACNC: 10 SEC — SIGNIFICANT CHANGE UP (ref 9.5–13)
RBC # BLD: 4.05 M/UL — LOW (ref 4.2–5.8)
RBC # FLD: 15 % — HIGH (ref 10.3–14.5)
S AUREUS DNA NOSE QL NAA+PROBE: SIGNIFICANT CHANGE UP
S PNEUM AG UR QL: NEGATIVE — SIGNIFICANT CHANGE UP
SODIUM SERPL-SCNC: 142 MMOL/L — SIGNIFICANT CHANGE UP (ref 135–145)
SPECIMEN SOURCE: SIGNIFICANT CHANGE UP
WBC # BLD: 9.02 K/UL — SIGNIFICANT CHANGE UP (ref 3.8–10.5)
WBC # FLD AUTO: 9.02 K/UL — SIGNIFICANT CHANGE UP (ref 3.8–10.5)

## 2024-04-17 RX ORDER — INSULIN LISPRO 100/ML
VIAL (ML) SUBCUTANEOUS
Refills: 0 | Status: DISCONTINUED | OUTPATIENT
Start: 2024-04-17 | End: 2024-04-17

## 2024-04-17 RX ORDER — INSULIN LISPRO 100/ML
VIAL (ML) SUBCUTANEOUS AT BEDTIME
Refills: 0 | Status: DISCONTINUED | OUTPATIENT
Start: 2024-04-17 | End: 2024-04-17

## 2024-04-17 RX ORDER — POTASSIUM PHOSPHATE, MONOBASIC POTASSIUM PHOSPHATE, DIBASIC 236; 224 MG/ML; MG/ML
15 INJECTION, SOLUTION INTRAVENOUS ONCE
Refills: 0 | Status: COMPLETED | OUTPATIENT
Start: 2024-04-17 | End: 2024-04-17

## 2024-04-17 RX ORDER — INSULIN LISPRO 100/ML
3 VIAL (ML) SUBCUTANEOUS
Refills: 0 | Status: DISCONTINUED | OUTPATIENT
Start: 2024-04-17 | End: 2024-04-18

## 2024-04-17 RX ORDER — INSULIN GLARGINE 100 [IU]/ML
10 INJECTION, SOLUTION SUBCUTANEOUS AT BEDTIME
Refills: 0 | Status: DISCONTINUED | OUTPATIENT
Start: 2024-04-17 | End: 2024-04-18

## 2024-04-17 RX ORDER — INSULIN LISPRO 100/ML
VIAL (ML) SUBCUTANEOUS EVERY 6 HOURS
Refills: 0 | Status: DISCONTINUED | OUTPATIENT
Start: 2024-04-17 | End: 2024-04-21

## 2024-04-17 RX ADMIN — Medication 3 MILLILITER(S): at 11:13

## 2024-04-17 RX ADMIN — APIXABAN 5 MILLIGRAM(S): 2.5 TABLET, FILM COATED ORAL at 05:35

## 2024-04-17 RX ADMIN — Medication 81 MILLIGRAM(S): at 11:42

## 2024-04-17 RX ADMIN — CHLORHEXIDINE GLUCONATE 15 MILLILITER(S): 213 SOLUTION TOPICAL at 05:35

## 2024-04-17 RX ADMIN — Medication 750 MILLIGRAM(S): at 11:43

## 2024-04-17 RX ADMIN — Medication 3 UNIT(S): at 11:42

## 2024-04-17 RX ADMIN — Medication 4: at 05:26

## 2024-04-17 RX ADMIN — Medication 3 MILLILITER(S): at 17:45

## 2024-04-17 RX ADMIN — PROPOFOL 7.59 MICROGRAM(S)/KG/MIN: 10 INJECTION, EMULSION INTRAVENOUS at 05:03

## 2024-04-17 RX ADMIN — APIXABAN 5 MILLIGRAM(S): 2.5 TABLET, FILM COATED ORAL at 17:15

## 2024-04-17 RX ADMIN — LEVETIRACETAM 1000 MILLIGRAM(S): 250 TABLET, FILM COATED ORAL at 05:35

## 2024-04-17 RX ADMIN — POTASSIUM PHOSPHATE, MONOBASIC POTASSIUM PHOSPHATE, DIBASIC 62.5 MILLIMOLE(S): 236; 224 INJECTION, SOLUTION INTRAVENOUS at 05:36

## 2024-04-17 RX ADMIN — PROPOFOL 7.59 MICROGRAM(S)/KG/MIN: 10 INJECTION, EMULSION INTRAVENOUS at 07:50

## 2024-04-17 RX ADMIN — Medication 650 MILLIGRAM(S): at 05:35

## 2024-04-17 RX ADMIN — Medication 3 MILLILITER(S): at 00:04

## 2024-04-17 RX ADMIN — Medication 3 UNIT(S): at 17:11

## 2024-04-17 RX ADMIN — INSULIN GLARGINE 10 UNIT(S): 100 INJECTION, SOLUTION SUBCUTANEOUS at 21:31

## 2024-04-17 RX ADMIN — Medication 2: at 23:24

## 2024-04-17 RX ADMIN — ATORVASTATIN CALCIUM 80 MILLIGRAM(S): 80 TABLET, FILM COATED ORAL at 21:17

## 2024-04-17 RX ADMIN — PIPERACILLIN AND TAZOBACTAM 25 GRAM(S): 4; .5 INJECTION, POWDER, LYOPHILIZED, FOR SOLUTION INTRAVENOUS at 21:16

## 2024-04-17 RX ADMIN — PROPOFOL 7.59 MICROGRAM(S)/KG/MIN: 10 INJECTION, EMULSION INTRAVENOUS at 10:01

## 2024-04-17 RX ADMIN — CHLORHEXIDINE GLUCONATE 15 MILLILITER(S): 213 SOLUTION TOPICAL at 17:12

## 2024-04-17 RX ADMIN — PROPOFOL 7.59 MICROGRAM(S)/KG/MIN: 10 INJECTION, EMULSION INTRAVENOUS at 16:45

## 2024-04-17 RX ADMIN — FENTANYL CITRATE 6.33 MICROGRAM(S)/KG/HR: 50 INJECTION INTRAVENOUS at 07:50

## 2024-04-17 RX ADMIN — CHLORHEXIDINE GLUCONATE 1 APPLICATION(S): 213 SOLUTION TOPICAL at 05:36

## 2024-04-17 RX ADMIN — PROPOFOL 7.59 MICROGRAM(S)/KG/MIN: 10 INJECTION, EMULSION INTRAVENOUS at 21:38

## 2024-04-17 RX ADMIN — LEVETIRACETAM 1000 MILLIGRAM(S): 250 TABLET, FILM COATED ORAL at 17:15

## 2024-04-17 RX ADMIN — PIPERACILLIN AND TAZOBACTAM 25 GRAM(S): 4; .5 INJECTION, POWDER, LYOPHILIZED, FOR SOLUTION INTRAVENOUS at 05:36

## 2024-04-17 RX ADMIN — PIPERACILLIN AND TAZOBACTAM 25 GRAM(S): 4; .5 INJECTION, POWDER, LYOPHILIZED, FOR SOLUTION INTRAVENOUS at 12:58

## 2024-04-17 RX ADMIN — Medication 2: at 17:11

## 2024-04-17 RX ADMIN — TAMSULOSIN HYDROCHLORIDE 0.4 MILLIGRAM(S): 0.4 CAPSULE ORAL at 21:17

## 2024-04-17 RX ADMIN — Medication 6: at 11:42

## 2024-04-17 RX ADMIN — Medication 650 MILLIGRAM(S): at 06:24

## 2024-04-17 RX ADMIN — Medication 3 MILLILITER(S): at 05:24

## 2024-04-17 RX ADMIN — Medication 25 MILLIGRAM(S): at 05:36

## 2024-04-17 NOTE — PROGRESS NOTE ADULT - ASSESSMENT
73-year-old male with a PMH of  DM, HTN, Seizure, TBI, COPD CHF cardiac arrest BPH admitted to [facility] for aspiration PNA. Patient admitted to ICU for hypoxic respiratory failure in setting of witnessed aspiration event on GMF.       - sedated with propofol/fent  - daily SATs/SBTs  - c/w nebs. vent adjustment per ABG.  - No vasopressors at this time, maintaining MAP >65   -Diet: NPO. start TF  - FU urine output   - replete lytes as appropriate   - Will continue Zosyn for now for aspiration pna  - Continue home eliquis for AF   - maintaining goal glucose<180 with ISS

## 2024-04-17 NOTE — PHARMACOTHERAPY INTERVENTION NOTE - COMMENTS
Recommended initiation of insulin glargine 10 units at bedtime as well as insulin lispro 3 units three times a day for hyperglycemia. Pt is also on ISS.

## 2024-04-18 LAB
-  STAPHYLOCOCCUS EPIDERMIDIS, METHICILLIN RESISTANT: SIGNIFICANT CHANGE UP
ALBUMIN SERPL ELPH-MCNC: 1.8 G/DL — LOW (ref 3.3–5)
ALP SERPL-CCNC: 52 U/L — SIGNIFICANT CHANGE UP (ref 40–120)
ALT FLD-CCNC: 16 U/L — SIGNIFICANT CHANGE UP (ref 12–78)
ANION GAP SERPL CALC-SCNC: 7 MMOL/L — SIGNIFICANT CHANGE UP (ref 5–17)
ANION GAP SERPL CALC-SCNC: 7 MMOL/L — SIGNIFICANT CHANGE UP (ref 5–17)
AST SERPL-CCNC: 18 U/L — SIGNIFICANT CHANGE UP (ref 15–37)
BASOPHILS # BLD AUTO: 0.07 K/UL — SIGNIFICANT CHANGE UP (ref 0–0.2)
BASOPHILS NFR BLD AUTO: 0.8 % — SIGNIFICANT CHANGE UP (ref 0–2)
BILIRUB SERPL-MCNC: 0.3 MG/DL — SIGNIFICANT CHANGE UP (ref 0.2–1.2)
BUN SERPL-MCNC: 20 MG/DL — SIGNIFICANT CHANGE UP (ref 7–23)
BUN SERPL-MCNC: 21 MG/DL — SIGNIFICANT CHANGE UP (ref 7–23)
CALCIUM SERPL-MCNC: 8.4 MG/DL — LOW (ref 8.5–10.1)
CALCIUM SERPL-MCNC: 8.6 MG/DL — SIGNIFICANT CHANGE UP (ref 8.5–10.1)
CHLORIDE SERPL-SCNC: 111 MMOL/L — HIGH (ref 96–108)
CHLORIDE SERPL-SCNC: 111 MMOL/L — HIGH (ref 96–108)
CO2 SERPL-SCNC: 23 MMOL/L — SIGNIFICANT CHANGE UP (ref 22–31)
CO2 SERPL-SCNC: 25 MMOL/L — SIGNIFICANT CHANGE UP (ref 22–31)
CREAT SERPL-MCNC: 0.87 MG/DL — SIGNIFICANT CHANGE UP (ref 0.5–1.3)
CREAT SERPL-MCNC: 0.92 MG/DL — SIGNIFICANT CHANGE UP (ref 0.5–1.3)
CULTURE RESULTS: NO GROWTH — SIGNIFICANT CHANGE UP
EGFR: 87 ML/MIN/1.73M2 — SIGNIFICANT CHANGE UP
EGFR: 90 ML/MIN/1.73M2 — SIGNIFICANT CHANGE UP
EOSINOPHIL # BLD AUTO: 0.86 K/UL — HIGH (ref 0–0.5)
EOSINOPHIL NFR BLD AUTO: 9.7 % — HIGH (ref 0–6)
GLUCOSE BLDC GLUCOMTR-MCNC: 208 MG/DL — HIGH (ref 70–99)
GLUCOSE BLDC GLUCOMTR-MCNC: 209 MG/DL — HIGH (ref 70–99)
GLUCOSE BLDC GLUCOMTR-MCNC: 220 MG/DL — HIGH (ref 70–99)
GLUCOSE BLDC GLUCOMTR-MCNC: 224 MG/DL — HIGH (ref 70–99)
GLUCOSE BLDC GLUCOMTR-MCNC: 261 MG/DL — HIGH (ref 70–99)
GLUCOSE SERPL-MCNC: 191 MG/DL — HIGH (ref 70–99)
GLUCOSE SERPL-MCNC: 254 MG/DL — HIGH (ref 70–99)
GRAM STN FLD: SIGNIFICANT CHANGE UP
HCT VFR BLD CALC: 38 % — LOW (ref 39–50)
HGB BLD-MCNC: 12.2 G/DL — LOW (ref 13–17)
IMM GRANULOCYTES NFR BLD AUTO: 1 % — HIGH (ref 0–0.9)
LYMPHOCYTES # BLD AUTO: 2.21 K/UL — SIGNIFICANT CHANGE UP (ref 1–3.3)
LYMPHOCYTES # BLD AUTO: 24.9 % — SIGNIFICANT CHANGE UP (ref 13–44)
M PNEUMO IGM SER-ACNC: 0.16 INDEX — SIGNIFICANT CHANGE UP (ref 0–0.9)
MAGNESIUM SERPL-MCNC: 2.1 MG/DL — SIGNIFICANT CHANGE UP (ref 1.6–2.6)
MAGNESIUM SERPL-MCNC: 2.3 MG/DL — SIGNIFICANT CHANGE UP (ref 1.6–2.6)
MCHC RBC-ENTMCNC: 29.8 PG — SIGNIFICANT CHANGE UP (ref 27–34)
MCHC RBC-ENTMCNC: 32.1 G/DL — SIGNIFICANT CHANGE UP (ref 32–36)
MCV RBC AUTO: 92.9 FL — SIGNIFICANT CHANGE UP (ref 80–100)
METHOD TYPE: SIGNIFICANT CHANGE UP
MONOCYTES # BLD AUTO: 0.86 K/UL — SIGNIFICANT CHANGE UP (ref 0–0.9)
MONOCYTES NFR BLD AUTO: 9.7 % — SIGNIFICANT CHANGE UP (ref 2–14)
MYCOPLASMA AG SPEC QL: NEGATIVE — SIGNIFICANT CHANGE UP
NEUTROPHILS # BLD AUTO: 4.78 K/UL — SIGNIFICANT CHANGE UP (ref 1.8–7.4)
NEUTROPHILS NFR BLD AUTO: 53.9 % — SIGNIFICANT CHANGE UP (ref 43–77)
NRBC # BLD: 0 /100 WBCS — SIGNIFICANT CHANGE UP (ref 0–0)
PHOSPHATE SERPL-MCNC: 2.8 MG/DL — SIGNIFICANT CHANGE UP (ref 2.5–4.5)
PHOSPHATE SERPL-MCNC: 2.8 MG/DL — SIGNIFICANT CHANGE UP (ref 2.5–4.5)
PLATELET # BLD AUTO: 190 K/UL — SIGNIFICANT CHANGE UP (ref 150–400)
POTASSIUM SERPL-MCNC: 3.2 MMOL/L — LOW (ref 3.5–5.3)
POTASSIUM SERPL-MCNC: 4.7 MMOL/L — SIGNIFICANT CHANGE UP (ref 3.5–5.3)
POTASSIUM SERPL-SCNC: 3.2 MMOL/L — LOW (ref 3.5–5.3)
POTASSIUM SERPL-SCNC: 4.7 MMOL/L — SIGNIFICANT CHANGE UP (ref 3.5–5.3)
PROCALCITONIN SERPL-MCNC: 0.46 NG/ML — HIGH (ref 0.02–0.1)
PROT SERPL-MCNC: 5.8 GM/DL — LOW (ref 6–8.3)
RBC # BLD: 4.09 M/UL — LOW (ref 4.2–5.8)
RBC # FLD: 15.3 % — HIGH (ref 10.3–14.5)
SODIUM SERPL-SCNC: 141 MMOL/L — SIGNIFICANT CHANGE UP (ref 135–145)
SODIUM SERPL-SCNC: 143 MMOL/L — SIGNIFICANT CHANGE UP (ref 135–145)
SPECIMEN SOURCE: SIGNIFICANT CHANGE UP
WBC # BLD: 8.87 K/UL — SIGNIFICANT CHANGE UP (ref 3.8–10.5)
WBC # FLD AUTO: 8.87 K/UL — SIGNIFICANT CHANGE UP (ref 3.8–10.5)

## 2024-04-18 PROCEDURE — 99291 CRITICAL CARE FIRST HOUR: CPT

## 2024-04-18 RX ORDER — DEXMEDETOMIDINE HYDROCHLORIDE IN 0.9% SODIUM CHLORIDE 4 UG/ML
0.2 INJECTION INTRAVENOUS
Qty: 200 | Refills: 0 | Status: DISCONTINUED | OUTPATIENT
Start: 2024-04-18 | End: 2024-04-23

## 2024-04-18 RX ORDER — FUROSEMIDE 40 MG
20 TABLET ORAL ONCE
Refills: 0 | Status: COMPLETED | OUTPATIENT
Start: 2024-04-18 | End: 2024-04-18

## 2024-04-18 RX ORDER — INSULIN LISPRO 100/ML
4 VIAL (ML) SUBCUTANEOUS EVERY 6 HOURS
Refills: 0 | Status: DISCONTINUED | OUTPATIENT
Start: 2024-04-18 | End: 2024-04-20

## 2024-04-18 RX ORDER — INSULIN LISPRO 100/ML
5 VIAL (ML) SUBCUTANEOUS
Refills: 0 | Status: DISCONTINUED | OUTPATIENT
Start: 2024-04-18 | End: 2024-04-18

## 2024-04-18 RX ORDER — POTASSIUM CHLORIDE 20 MEQ
40 PACKET (EA) ORAL ONCE
Refills: 0 | Status: COMPLETED | OUTPATIENT
Start: 2024-04-18 | End: 2024-04-18

## 2024-04-18 RX ORDER — INSULIN GLARGINE 100 [IU]/ML
15 INJECTION, SOLUTION SUBCUTANEOUS AT BEDTIME
Refills: 0 | Status: DISCONTINUED | OUTPATIENT
Start: 2024-04-18 | End: 2024-04-20

## 2024-04-18 RX ORDER — ACETAMINOPHEN 500 MG
1000 TABLET ORAL ONCE
Refills: 0 | Status: COMPLETED | OUTPATIENT
Start: 2024-04-18 | End: 2024-04-18

## 2024-04-18 RX ORDER — POTASSIUM CHLORIDE 20 MEQ
10 PACKET (EA) ORAL
Refills: 0 | Status: COMPLETED | OUTPATIENT
Start: 2024-04-18 | End: 2024-04-18

## 2024-04-18 RX ADMIN — Medication 100 MILLIEQUIVALENT(S): at 07:22

## 2024-04-18 RX ADMIN — FENTANYL CITRATE 6.33 MICROGRAM(S)/KG/HR: 50 INJECTION INTRAVENOUS at 07:22

## 2024-04-18 RX ADMIN — Medication 3 MILLILITER(S): at 05:42

## 2024-04-18 RX ADMIN — FENTANYL CITRATE 6.33 MICROGRAM(S)/KG/HR: 50 INJECTION INTRAVENOUS at 19:28

## 2024-04-18 RX ADMIN — Medication 3 UNIT(S): at 07:30

## 2024-04-18 RX ADMIN — Medication 25 MILLIGRAM(S): at 17:02

## 2024-04-18 RX ADMIN — Medication 100 MILLIEQUIVALENT(S): at 08:27

## 2024-04-18 RX ADMIN — DEXMEDETOMIDINE HYDROCHLORIDE IN 0.9% SODIUM CHLORIDE 4.46 MICROGRAM(S)/KG/HR: 4 INJECTION INTRAVENOUS at 17:12

## 2024-04-18 RX ADMIN — Medication 3 MILLILITER(S): at 17:10

## 2024-04-18 RX ADMIN — Medication 3 MILLILITER(S): at 11:27

## 2024-04-18 RX ADMIN — APIXABAN 5 MILLIGRAM(S): 2.5 TABLET, FILM COATED ORAL at 05:18

## 2024-04-18 RX ADMIN — DEXMEDETOMIDINE HYDROCHLORIDE IN 0.9% SODIUM CHLORIDE 4.46 MICROGRAM(S)/KG/HR: 4 INJECTION INTRAVENOUS at 12:07

## 2024-04-18 RX ADMIN — PROPOFOL 7.59 MICROGRAM(S)/KG/MIN: 10 INJECTION, EMULSION INTRAVENOUS at 07:22

## 2024-04-18 RX ADMIN — Medication 400 MILLIGRAM(S): at 17:53

## 2024-04-18 RX ADMIN — LEVETIRACETAM 1000 MILLIGRAM(S): 250 TABLET, FILM COATED ORAL at 05:20

## 2024-04-18 RX ADMIN — Medication 4: at 11:34

## 2024-04-18 RX ADMIN — Medication 4: at 05:19

## 2024-04-18 RX ADMIN — APIXABAN 5 MILLIGRAM(S): 2.5 TABLET, FILM COATED ORAL at 17:02

## 2024-04-18 RX ADMIN — Medication 81 MILLIGRAM(S): at 11:48

## 2024-04-18 RX ADMIN — Medication 4 UNIT(S): at 17:21

## 2024-04-18 RX ADMIN — ATORVASTATIN CALCIUM 80 MILLIGRAM(S): 80 TABLET, FILM COATED ORAL at 21:58

## 2024-04-18 RX ADMIN — PIPERACILLIN AND TAZOBACTAM 25 GRAM(S): 4; .5 INJECTION, POWDER, LYOPHILIZED, FOR SOLUTION INTRAVENOUS at 13:14

## 2024-04-18 RX ADMIN — Medication 20 MILLIGRAM(S): at 21:58

## 2024-04-18 RX ADMIN — PROPOFOL 7.59 MICROGRAM(S)/KG/MIN: 10 INJECTION, EMULSION INTRAVENOUS at 09:08

## 2024-04-18 RX ADMIN — Medication 650 MILLIGRAM(S): at 05:19

## 2024-04-18 RX ADMIN — Medication 100 MILLIEQUIVALENT(S): at 09:51

## 2024-04-18 RX ADMIN — CHLORHEXIDINE GLUCONATE 1 APPLICATION(S): 213 SOLUTION TOPICAL at 05:20

## 2024-04-18 RX ADMIN — TAMSULOSIN HYDROCHLORIDE 0.4 MILLIGRAM(S): 0.4 CAPSULE ORAL at 21:58

## 2024-04-18 RX ADMIN — DEXMEDETOMIDINE HYDROCHLORIDE IN 0.9% SODIUM CHLORIDE 4.46 MICROGRAM(S)/KG/HR: 4 INJECTION INTRAVENOUS at 21:25

## 2024-04-18 RX ADMIN — FENTANYL CITRATE 6.33 MICROGRAM(S)/KG/HR: 50 INJECTION INTRAVENOUS at 17:13

## 2024-04-18 RX ADMIN — Medication 40 MILLIEQUIVALENT(S): at 17:02

## 2024-04-18 RX ADMIN — Medication 3 MILLILITER(S): at 00:00

## 2024-04-18 RX ADMIN — CHLORHEXIDINE GLUCONATE 15 MILLILITER(S): 213 SOLUTION TOPICAL at 17:02

## 2024-04-18 RX ADMIN — INSULIN GLARGINE 15 UNIT(S): 100 INJECTION, SOLUTION SUBCUTANEOUS at 23:13

## 2024-04-18 RX ADMIN — CHLORHEXIDINE GLUCONATE 15 MILLILITER(S): 213 SOLUTION TOPICAL at 05:19

## 2024-04-18 RX ADMIN — LEVETIRACETAM 1000 MILLIGRAM(S): 250 TABLET, FILM COATED ORAL at 17:02

## 2024-04-18 RX ADMIN — PIPERACILLIN AND TAZOBACTAM 25 GRAM(S): 4; .5 INJECTION, POWDER, LYOPHILIZED, FOR SOLUTION INTRAVENOUS at 21:58

## 2024-04-18 RX ADMIN — Medication 750 MILLIGRAM(S): at 11:48

## 2024-04-18 RX ADMIN — PIPERACILLIN AND TAZOBACTAM 25 GRAM(S): 4; .5 INJECTION, POWDER, LYOPHILIZED, FOR SOLUTION INTRAVENOUS at 05:20

## 2024-04-18 RX ADMIN — Medication 4: at 17:21

## 2024-04-18 RX ADMIN — PROPOFOL 7.59 MICROGRAM(S)/KG/MIN: 10 INJECTION, EMULSION INTRAVENOUS at 02:56

## 2024-04-18 RX ADMIN — Medication 1000 MILLIGRAM(S): at 18:22

## 2024-04-18 RX ADMIN — DEXMEDETOMIDINE HYDROCHLORIDE IN 0.9% SODIUM CHLORIDE 4.46 MICROGRAM(S)/KG/HR: 4 INJECTION INTRAVENOUS at 19:28

## 2024-04-18 RX ADMIN — Medication 650 MILLIGRAM(S): at 06:26

## 2024-04-18 RX ADMIN — DEXMEDETOMIDINE HYDROCHLORIDE IN 0.9% SODIUM CHLORIDE 4.46 MICROGRAM(S)/KG/HR: 4 INJECTION INTRAVENOUS at 10:11

## 2024-04-18 NOTE — DIETITIAN INITIAL EVALUATION ADULT - PERTINENT MEDS FT
MEDICATIONS  (STANDING):  albuterol/ipratropium for Nebulization 3 milliLiter(s) Nebulizer every 6 hours  apixaban 5 milliGRAM(s) Oral every 12 hours  aspirin enteric coated 81 milliGRAM(s) Oral daily  atorvastatin 80 milliGRAM(s) Oral at bedtime  chlorhexidine 0.12% Liquid 15 milliLiter(s) Oral Mucosa every 12 hours  chlorhexidine 2% Cloths 1 Application(s) Topical <User Schedule>  dexMEDEtomidine Infusion 0.2 MICROgram(s)/kG/Hr (4.46 mL/Hr) IV Continuous <Continuous>  fentaNYL   Infusion. 0.5 MICROgram(s)/kG/Hr (6.33 mL/Hr) IV Continuous <Continuous>  insulin glargine Injectable (LANTUS) 15 Unit(s) SubCutaneous at bedtime  insulin lispro (ADMELOG) corrective regimen sliding scale   SubCutaneous every 6 hours  insulin lispro Injectable (ADMELOG) 4 Unit(s) SubCutaneous every 6 hours  levETIRAcetam 1000 milliGRAM(s) Oral two times a day  metoprolol tartrate 25 milliGRAM(s) Oral two times a day  piperacillin/tazobactam IVPB.. 3.375 Gram(s) IV Intermittent every 8 hours  potassium chloride   Powder 40 milliEquivalent(s) Oral once  propofol Infusion 10 MICROgram(s)/kG/Min (7.59 mL/Hr) IV Continuous <Continuous>  tamsulosin 0.4 milliGRAM(s) Oral at bedtime  valproic acid 750 milliGRAM(s) Oral daily    MEDICATIONS  (PRN):  acetaminophen     Tablet .. 650 milliGRAM(s) Oral every 6 hours PRN Temp greater or equal to 38C (100.4F), Mild Pain (1 - 3)  albuterol    90 MICROgram(s) HFA Inhaler 2 Puff(s) Inhalation every 6 hours PRN Shortness of Breath and/or Wheezing  ondansetron Injectable 4 milliGRAM(s) IV Push every 8 hours PRN Nausea and/or Vomiting

## 2024-04-18 NOTE — PHARMACOTHERAPY INTERVENTION NOTE - COMMENTS
Recommended increasing insulin Lantus 10 units to 15 units, and lispro 3 units TID to 5 units TID due to hyperglycemia. Patient received 10 units of ISS yesterday.   Recommended increasing insulin Lantus 10 units to 15 units, and lispro 3 units TID to 4 units q6h due to hyperglycemia. Patient received 10 units of ISS yesterday.

## 2024-04-18 NOTE — DIETITIAN INITIAL EVALUATION ADULT - NUTRITON FOCUSED PHYSICAL EXAM
Procedure:  NAVIGATIONAL BRONCHOSCOPY WITH EBUS    Relevant Problems   PULMONARY   (+) Pulmonary emphysema, unspecified emphysema type (HCC)      Other   (+) Mediastinal adenopathy        Physical Exam    Airway    Mallampati score: II  TM Distance: >3 FB  Neck ROM: full     Dental   lower dentures and implants,     Cardiovascular      Pulmonary      Other Findings        Anesthesia Plan  ASA Score- 2     Anesthesia Type- general with ASA Monitors. Additional Monitors:   Airway Plan: ETT. Plan Factors-Exercise tolerance (METS): >4 METS. Chart reviewed. Patient is not a current smoker. Patient did not smoke on day of surgery. Obstructive sleep apnea risk education given perioperatively. Induction- intravenous. Postoperative Plan- Plan for postoperative opioid use. Planned trial extubation    Informed Consent- Anesthetic plan and risks discussed with patient. I personally reviewed this patient with the CRNA. Discussed and agreed on the Anesthesia Plan with the CRNA. Selene Leavitt Anesthesia plan and consent discussed with Jose Villavicencio who expressed understanding and agreement. Risks/benefits and alternatives discussed with patient including possible PONV, sore throat, damage to teeth/lips/gums and possibility of rare anesthetic and surgical emergencies. no...

## 2024-04-18 NOTE — DIETITIAN INITIAL EVALUATION ADULT - PERTINENT LABORATORY DATA
04-18    143  |  111<H>  |  21  ----------------------------<  191<H>  3.2<L>   |  25  |  0.92    Ca    8.4<L>      18 Apr 2024 02:30  Phos  2.8     04-18  Mg     2.3     04-18    TPro  5.8<L>  /  Alb  1.8<L>  /  TBili  0.3  /  DBili  x   /  AST  18  /  ALT  16  /  AlkPhos  52  04-18  POCT Blood Glucose.: 209 mg/dL (04-18-24 @ 11:31)  A1C with Estimated Average Glucose Result: 9.2 % (04-15-24 @ 05:25)  A1C with Estimated Average Glucose Result: 7.0 % (08-28-23 @ 06:47)   04-18    143  |  111<H>  |  21  ----------------------------<  191<H>  3.2<L>   |  25  |  0.92    Ca    8.4<L>      18 Apr 2024 02:30  Phos  2.8     04-18  Mg     2.3     04-18    TPro  5.8<L>  /  Alb  1.8<L>  /  TBili  0.3  /  DBili  x   /  AST  18  /  ALT  16  /  AlkPhos  52  04-18  POCT Blood Glucose.: 209 mg/dL (04-18-24 @ 11:31)  A1C with Estimated Average Glucose Result: 9.2 % <H>(04-15-24 @ 05:25)  A1C with Estimated Average Glucose Result: 7.0 % <H>(08-28-23 @ 06:47)

## 2024-04-18 NOTE — DIETITIAN INITIAL EVALUATION ADULT - OTHER INFO
Pt has not reached goal rate as per order, last documented Glucerna infusion was 50 ml/hr.  Glucerna 1.2 @ goal of 70 ml/hr x 18 hours will provide 1260 ml, 1512 calories, 76 grams protein, 1014 ml free water daily.  Pt received 392.4 calories from 327 ml propofol on 04/17.  Pt was on Semglee insulin glargine, Admelog sliding scale coverage & Jardiance for DM as per transfer records.

## 2024-04-18 NOTE — CHART NOTE - NSCHARTNOTEFT_GEN_A_CORE
:  Ankita Parra, Critical Care ACP Fellow    Indication:  Evaluate for pleural effusion    PROCEDURE:  [ ] LIMITED ECHO  [X] LIMITED CHEST  [ ] LIMITED RETROPERITONEAL  [ ] LIMITED ABDOMINAL  [ ] LIMITED DVT  [ ] NEEDLE GUIDANCE VASCULAR  [ ] NEEDLE GUIDANCE THORACENTESIS  [ ] NEEDLE GUIDANCE PARACENTESIS  [ ] NEEDLE GUIDANCE PERICARDIOCENTESIS  [ ] OTHER    FINDINGS:  Chest: Rt sided consolidation, rt sided pleural effusion. Scattered b lines left side.     Images stored on [employer].    INTERPRETATION: Rt sided pleural effusion, scattered lt sided b lines in setting of COPD.

## 2024-04-18 NOTE — DIETITIAN INITIAL EVALUATION ADULT - NS FNS DIET ORDER
Diet, NPO with Tube Feed:   Tube Feeding Modality: Nasogastric  Glucerna 1.2 Pedrito  Total Volume for 24 Hours (mL): 1163.1-> 1260 ml  Intermittent  Starting Tube Feed Rate {mL per Hour}: 20  Increase Tube Feed Rate by (mL): 10    Every 4 hours  Until Goal Tube Feed Rate (mL per Hour): 70  Tube Feeding Hours ON: 18  Tube Feeding OFF (Hours): 8->6   Tube Feed Start Time: 17:00 (04-17-24 @ 09:44) [Active]

## 2024-04-18 NOTE — DIETITIAN INITIAL EVALUATION ADULT - ORAL INTAKE PTA/DIET HISTORY
Pt is on vent, pt's son was @ bedside.   Pt/son is known to RD from previous admissions.  [facility]  Diet PTA: consistent carbohydrate, DASH/ TLC, regular consistency, thin fluids.  Pt does not eat beef or pork.

## 2024-04-18 NOTE — DIETITIAN INITIAL EVALUATION ADULT - OTHER CALCULATIONS
04/14, 126.5 kg(drug dose)/118.5 kg(daily wt.), ? scale error  04/16, 89.2 kg, 04/18, 90.7 kg(daily wt.)  I/O: 1260/815(+445)

## 2024-04-18 NOTE — PROGRESS NOTE ADULT - ASSESSMENT
73-year-old male with a PMH of  DM, HTN, Seizure, TBI, COPD CHF cardiac arrest BPH admitted to [facility] for aspiration PNA. Patient admitted to ICU for hypoxic respiratory failure in setting of witnessed aspiration event on GMF.       - sedated with propofol/fent.   - daily SATs/SBTs  - c/w nebs. vent adjustment per ABG.  - No vasopressors at this time, maintaining MAP >65   -Diet: NPO. TF  - FU urine output   - replete lytes as appropriate   - Will continue Zosyn for now for aspiration pna  - Continue home eliquis for AF   - maintaining goal glucose<180 with ISS

## 2024-04-19 ENCOUNTER — RESULT REVIEW (OUTPATIENT)
Age: 74
End: 2024-04-19

## 2024-04-19 LAB
ALBUMIN SERPL ELPH-MCNC: 1.8 G/DL — LOW (ref 3.3–5)
ALP SERPL-CCNC: 65 U/L — SIGNIFICANT CHANGE UP (ref 40–120)
ALT FLD-CCNC: 19 U/L — SIGNIFICANT CHANGE UP (ref 12–78)
ANION GAP SERPL CALC-SCNC: 7 MMOL/L — SIGNIFICANT CHANGE UP (ref 5–17)
APPEARANCE UR: CLEAR — SIGNIFICANT CHANGE UP
AST SERPL-CCNC: 17 U/L — SIGNIFICANT CHANGE UP (ref 15–37)
BACTERIA # UR AUTO: NEGATIVE /HPF — SIGNIFICANT CHANGE UP
BASOPHILS # BLD AUTO: 0.11 K/UL — SIGNIFICANT CHANGE UP (ref 0–0.2)
BASOPHILS NFR BLD AUTO: 1.1 % — SIGNIFICANT CHANGE UP (ref 0–2)
BILIRUB SERPL-MCNC: 0.3 MG/DL — SIGNIFICANT CHANGE UP (ref 0.2–1.2)
BILIRUB UR-MCNC: NEGATIVE — SIGNIFICANT CHANGE UP
BUN SERPL-MCNC: 19 MG/DL — SIGNIFICANT CHANGE UP (ref 7–23)
CALCIUM SERPL-MCNC: 8.9 MG/DL — SIGNIFICANT CHANGE UP (ref 8.5–10.1)
CHLORIDE SERPL-SCNC: 109 MMOL/L — HIGH (ref 96–108)
CO2 SERPL-SCNC: 27 MMOL/L — SIGNIFICANT CHANGE UP (ref 22–31)
COLOR SPEC: YELLOW — SIGNIFICANT CHANGE UP
CREAT SERPL-MCNC: 1 MG/DL — SIGNIFICANT CHANGE UP (ref 0.5–1.3)
CULTURE RESULTS: ABNORMAL
DIFF PNL FLD: NEGATIVE — SIGNIFICANT CHANGE UP
EGFR: 78 ML/MIN/1.73M2 — SIGNIFICANT CHANGE UP
EOSINOPHIL # BLD AUTO: 0.89 K/UL — HIGH (ref 0–0.5)
EOSINOPHIL NFR BLD AUTO: 9.3 % — HIGH (ref 0–6)
EPI CELLS # UR: SIGNIFICANT CHANGE UP
GLUCOSE BLDC GLUCOMTR-MCNC: 241 MG/DL — HIGH (ref 70–99)
GLUCOSE BLDC GLUCOMTR-MCNC: 258 MG/DL — HIGH (ref 70–99)
GLUCOSE BLDC GLUCOMTR-MCNC: 274 MG/DL — HIGH (ref 70–99)
GLUCOSE BLDC GLUCOMTR-MCNC: 296 MG/DL — HIGH (ref 70–99)
GLUCOSE SERPL-MCNC: 245 MG/DL — HIGH (ref 70–99)
GLUCOSE UR QL: >=1000 MG/DL
GRAM STN FLD: ABNORMAL
HCT VFR BLD CALC: 40.6 % — SIGNIFICANT CHANGE UP (ref 39–50)
HGB BLD-MCNC: 13 G/DL — SIGNIFICANT CHANGE UP (ref 13–17)
IMM GRANULOCYTES NFR BLD AUTO: 0.5 % — SIGNIFICANT CHANGE UP (ref 0–0.9)
KETONES UR-MCNC: 15 MG/DL
LEUKOCYTE ESTERASE UR-ACNC: NEGATIVE — SIGNIFICANT CHANGE UP
LYMPHOCYTES # BLD AUTO: 2.3 K/UL — SIGNIFICANT CHANGE UP (ref 1–3.3)
LYMPHOCYTES # BLD AUTO: 24 % — SIGNIFICANT CHANGE UP (ref 13–44)
MAGNESIUM SERPL-MCNC: 2 MG/DL — SIGNIFICANT CHANGE UP (ref 1.6–2.6)
MCHC RBC-ENTMCNC: 30.2 PG — SIGNIFICANT CHANGE UP (ref 27–34)
MCHC RBC-ENTMCNC: 32 G/DL — SIGNIFICANT CHANGE UP (ref 32–36)
MCV RBC AUTO: 94.4 FL — SIGNIFICANT CHANGE UP (ref 80–100)
MONOCYTES # BLD AUTO: 0.89 K/UL — SIGNIFICANT CHANGE UP (ref 0–0.9)
MONOCYTES NFR BLD AUTO: 9.3 % — SIGNIFICANT CHANGE UP (ref 2–14)
NEUTROPHILS # BLD AUTO: 5.35 K/UL — SIGNIFICANT CHANGE UP (ref 1.8–7.4)
NEUTROPHILS NFR BLD AUTO: 55.8 % — SIGNIFICANT CHANGE UP (ref 43–77)
NITRITE UR-MCNC: NEGATIVE — SIGNIFICANT CHANGE UP
NRBC # BLD: 0 /100 WBCS — SIGNIFICANT CHANGE UP (ref 0–0)
ORGANISM # SPEC MICROSCOPIC CNT: ABNORMAL
ORGANISM # SPEC MICROSCOPIC CNT: SIGNIFICANT CHANGE UP
PH UR: 6.5 — SIGNIFICANT CHANGE UP (ref 5–8)
PHOSPHATE SERPL-MCNC: 2.9 MG/DL — SIGNIFICANT CHANGE UP (ref 2.5–4.5)
PLATELET # BLD AUTO: 186 K/UL — SIGNIFICANT CHANGE UP (ref 150–400)
POTASSIUM SERPL-MCNC: 3.5 MMOL/L — SIGNIFICANT CHANGE UP (ref 3.5–5.3)
POTASSIUM SERPL-SCNC: 3.5 MMOL/L — SIGNIFICANT CHANGE UP (ref 3.5–5.3)
PROT SERPL-MCNC: 6.3 GM/DL — SIGNIFICANT CHANGE UP (ref 6–8.3)
PROT UR-MCNC: 100 MG/DL
RBC # BLD: 4.3 M/UL — SIGNIFICANT CHANGE UP (ref 4.2–5.8)
RBC # FLD: 14.8 % — HIGH (ref 10.3–14.5)
RBC CASTS # UR COMP ASSIST: 2 /HPF — SIGNIFICANT CHANGE UP (ref 0–4)
SODIUM SERPL-SCNC: 143 MMOL/L — SIGNIFICANT CHANGE UP (ref 135–145)
SP GR SPEC: 1.03 — SIGNIFICANT CHANGE UP (ref 1–1.03)
SPECIMEN SOURCE: SIGNIFICANT CHANGE UP
UROBILINOGEN FLD QL: 0.2 MG/DL — SIGNIFICANT CHANGE UP (ref 0.2–1)
WBC # BLD: 9.59 K/UL — SIGNIFICANT CHANGE UP (ref 3.8–10.5)
WBC # FLD AUTO: 9.59 K/UL — SIGNIFICANT CHANGE UP (ref 3.8–10.5)
WBC UR QL: 1 /HPF — SIGNIFICANT CHANGE UP (ref 0–5)

## 2024-04-19 PROCEDURE — 71045 X-RAY EXAM CHEST 1 VIEW: CPT | Mod: 26

## 2024-04-19 PROCEDURE — 99291 CRITICAL CARE FIRST HOUR: CPT

## 2024-04-19 PROCEDURE — 93306 TTE W/DOPPLER COMPLETE: CPT | Mod: 26

## 2024-04-19 RX ORDER — ACETAMINOPHEN 500 MG
1000 TABLET ORAL ONCE
Refills: 0 | Status: COMPLETED | OUTPATIENT
Start: 2024-04-19 | End: 2024-04-19

## 2024-04-19 RX ORDER — VALPROIC ACID (AS SODIUM SALT) 250 MG/5ML
750 SOLUTION, ORAL ORAL DAILY
Refills: 0 | Status: DISCONTINUED | OUTPATIENT
Start: 2024-04-19 | End: 2024-05-02

## 2024-04-19 RX ORDER — SENNA PLUS 8.6 MG/1
2 TABLET ORAL AT BEDTIME
Refills: 0 | Status: DISCONTINUED | OUTPATIENT
Start: 2024-04-19 | End: 2024-05-02

## 2024-04-19 RX ORDER — HYDRALAZINE HCL 50 MG
10 TABLET ORAL ONCE
Refills: 0 | Status: COMPLETED | OUTPATIENT
Start: 2024-04-19 | End: 2024-04-19

## 2024-04-19 RX ORDER — HYDRALAZINE HCL 50 MG
10 TABLET ORAL ONCE
Refills: 0 | Status: DISCONTINUED | OUTPATIENT
Start: 2024-04-19 | End: 2024-04-19

## 2024-04-19 RX ORDER — POLYETHYLENE GLYCOL 3350 17 G/17G
17 POWDER, FOR SOLUTION ORAL DAILY
Refills: 0 | Status: DISCONTINUED | OUTPATIENT
Start: 2024-04-19 | End: 2024-05-02

## 2024-04-19 RX ORDER — POTASSIUM CHLORIDE 20 MEQ
40 PACKET (EA) ORAL ONCE
Refills: 0 | Status: COMPLETED | OUTPATIENT
Start: 2024-04-19 | End: 2024-04-19

## 2024-04-19 RX ADMIN — Medication 3 MILLILITER(S): at 17:26

## 2024-04-19 RX ADMIN — Medication 25 MILLIGRAM(S): at 16:17

## 2024-04-19 RX ADMIN — Medication 3 MILLILITER(S): at 06:27

## 2024-04-19 RX ADMIN — Medication 6: at 05:56

## 2024-04-19 RX ADMIN — DEXMEDETOMIDINE HYDROCHLORIDE IN 0.9% SODIUM CHLORIDE 4.46 MICROGRAM(S)/KG/HR: 4 INJECTION INTRAVENOUS at 19:46

## 2024-04-19 RX ADMIN — CHLORHEXIDINE GLUCONATE 15 MILLILITER(S): 213 SOLUTION TOPICAL at 05:56

## 2024-04-19 RX ADMIN — FENTANYL CITRATE 6.33 MICROGRAM(S)/KG/HR: 50 INJECTION INTRAVENOUS at 07:15

## 2024-04-19 RX ADMIN — Medication 25 MILLIGRAM(S): at 05:58

## 2024-04-19 RX ADMIN — APIXABAN 5 MILLIGRAM(S): 2.5 TABLET, FILM COATED ORAL at 05:56

## 2024-04-19 RX ADMIN — Medication 1000 MILLIGRAM(S): at 16:18

## 2024-04-19 RX ADMIN — PIPERACILLIN AND TAZOBACTAM 25 GRAM(S): 4; .5 INJECTION, POWDER, LYOPHILIZED, FOR SOLUTION INTRAVENOUS at 05:56

## 2024-04-19 RX ADMIN — Medication 4 UNIT(S): at 17:00

## 2024-04-19 RX ADMIN — DEXMEDETOMIDINE HYDROCHLORIDE IN 0.9% SODIUM CHLORIDE 4.46 MICROGRAM(S)/KG/HR: 4 INJECTION INTRAVENOUS at 01:29

## 2024-04-19 RX ADMIN — SENNA PLUS 2 TABLET(S): 8.6 TABLET ORAL at 23:02

## 2024-04-19 RX ADMIN — Medication 6: at 17:00

## 2024-04-19 RX ADMIN — POLYETHYLENE GLYCOL 3350 17 GRAM(S): 17 POWDER, FOR SOLUTION ORAL at 11:17

## 2024-04-19 RX ADMIN — Medication 40 MILLIEQUIVALENT(S): at 05:58

## 2024-04-19 RX ADMIN — Medication 650 MILLIGRAM(S): at 03:32

## 2024-04-19 RX ADMIN — TAMSULOSIN HYDROCHLORIDE 0.4 MILLIGRAM(S): 0.4 CAPSULE ORAL at 23:02

## 2024-04-19 RX ADMIN — Medication 3 MILLILITER(S): at 11:28

## 2024-04-19 RX ADMIN — Medication 4 UNIT(S): at 05:57

## 2024-04-19 RX ADMIN — Medication 3 MILLILITER(S): at 01:08

## 2024-04-19 RX ADMIN — LEVETIRACETAM 1000 MILLIGRAM(S): 250 TABLET, FILM COATED ORAL at 16:18

## 2024-04-19 RX ADMIN — DEXMEDETOMIDINE HYDROCHLORIDE IN 0.9% SODIUM CHLORIDE 4.46 MICROGRAM(S)/KG/HR: 4 INJECTION INTRAVENOUS at 07:15

## 2024-04-19 RX ADMIN — PIPERACILLIN AND TAZOBACTAM 25 GRAM(S): 4; .5 INJECTION, POWDER, LYOPHILIZED, FOR SOLUTION INTRAVENOUS at 14:56

## 2024-04-19 RX ADMIN — Medication 81 MILLIGRAM(S): at 11:20

## 2024-04-19 RX ADMIN — Medication 4: at 11:07

## 2024-04-19 RX ADMIN — Medication 650 MILLIGRAM(S): at 23:44

## 2024-04-19 RX ADMIN — LEVETIRACETAM 1000 MILLIGRAM(S): 250 TABLET, FILM COATED ORAL at 05:57

## 2024-04-19 RX ADMIN — Medication 6: at 00:24

## 2024-04-19 RX ADMIN — ATORVASTATIN CALCIUM 80 MILLIGRAM(S): 80 TABLET, FILM COATED ORAL at 23:02

## 2024-04-19 RX ADMIN — Medication 6: at 23:09

## 2024-04-19 RX ADMIN — Medication 4 UNIT(S): at 23:10

## 2024-04-19 RX ADMIN — Medication 650 MILLIGRAM(S): at 04:32

## 2024-04-19 RX ADMIN — APIXABAN 5 MILLIGRAM(S): 2.5 TABLET, FILM COATED ORAL at 16:17

## 2024-04-19 RX ADMIN — DEXMEDETOMIDINE HYDROCHLORIDE IN 0.9% SODIUM CHLORIDE 4.46 MICROGRAM(S)/KG/HR: 4 INJECTION INTRAVENOUS at 23:22

## 2024-04-19 RX ADMIN — Medication 650 MILLIGRAM(S): at 23:13

## 2024-04-19 RX ADMIN — DEXMEDETOMIDINE HYDROCHLORIDE IN 0.9% SODIUM CHLORIDE 4.46 MICROGRAM(S)/KG/HR: 4 INJECTION INTRAVENOUS at 05:55

## 2024-04-19 RX ADMIN — CHLORHEXIDINE GLUCONATE 1 APPLICATION(S): 213 SOLUTION TOPICAL at 04:00

## 2024-04-19 RX ADMIN — Medication 400 MILLIGRAM(S): at 16:18

## 2024-04-19 RX ADMIN — CHLORHEXIDINE GLUCONATE 15 MILLILITER(S): 213 SOLUTION TOPICAL at 16:17

## 2024-04-19 RX ADMIN — Medication 750 MILLIGRAM(S): at 16:35

## 2024-04-19 RX ADMIN — INSULIN GLARGINE 15 UNIT(S): 100 INJECTION, SOLUTION SUBCUTANEOUS at 23:01

## 2024-04-19 RX ADMIN — Medication 4 UNIT(S): at 00:34

## 2024-04-19 RX ADMIN — PIPERACILLIN AND TAZOBACTAM 25 GRAM(S): 4; .5 INJECTION, POWDER, LYOPHILIZED, FOR SOLUTION INTRAVENOUS at 23:07

## 2024-04-19 NOTE — PROGRESS NOTE ADULT - ASSESSMENT
73-year-old male with a PMH of  DM, HTN, Seizure, TBI, COPD CHF cardiac arrest BPH admitted to [facility] for aspiration PNA. Patient admitted to ICU for hypoxic respiratory failure in setting of witnessed aspiration event on GMF.       - sedated with propofol/fent.   - daily SATs/SBTs  - c/w nebs. vent adjustment per ABG.  - No vasopressors at this time, maintaining MAP >65   -Diet: NPO. TF  - FU urine output   - replete lytes as appropriate   - Will continue Zosyn for now for aspiration pna  - Continue home eliquis for AF   - maintaining goal glucose<180 with ISS

## 2024-04-20 LAB
ANION GAP SERPL CALC-SCNC: 3 MMOL/L — LOW (ref 5–17)
BUN SERPL-MCNC: 19 MG/DL — SIGNIFICANT CHANGE UP (ref 7–23)
CALCIUM SERPL-MCNC: 8.7 MG/DL — SIGNIFICANT CHANGE UP (ref 8.5–10.1)
CHLORIDE SERPL-SCNC: 107 MMOL/L — SIGNIFICANT CHANGE UP (ref 96–108)
CO2 SERPL-SCNC: 31 MMOL/L — SIGNIFICANT CHANGE UP (ref 22–31)
CREAT SERPL-MCNC: 1 MG/DL — SIGNIFICANT CHANGE UP (ref 0.5–1.3)
EGFR: 78 ML/MIN/1.73M2 — SIGNIFICANT CHANGE UP
GLUCOSE BLDC GLUCOMTR-MCNC: 295 MG/DL — HIGH (ref 70–99)
GLUCOSE BLDC GLUCOMTR-MCNC: 352 MG/DL — HIGH (ref 70–99)
GLUCOSE BLDC GLUCOMTR-MCNC: 355 MG/DL — HIGH (ref 70–99)
GLUCOSE SERPL-MCNC: 312 MG/DL — HIGH (ref 70–99)
GRAM STN FLD: SIGNIFICANT CHANGE UP
HCOV PNL SPEC NAA+PROBE: DETECTED
HCT VFR BLD CALC: 38.6 % — LOW (ref 39–50)
HGB BLD-MCNC: 12.7 G/DL — LOW (ref 13–17)
MAGNESIUM SERPL-MCNC: 2.2 MG/DL — SIGNIFICANT CHANGE UP (ref 1.6–2.6)
MCHC RBC-ENTMCNC: 30.9 PG — SIGNIFICANT CHANGE UP (ref 27–34)
MCHC RBC-ENTMCNC: 32.9 G/DL — SIGNIFICANT CHANGE UP (ref 32–36)
MCV RBC AUTO: 93.9 FL — SIGNIFICANT CHANGE UP (ref 80–100)
NRBC # BLD: 0 /100 WBCS — SIGNIFICANT CHANGE UP (ref 0–0)
PHOSPHATE SERPL-MCNC: 3.1 MG/DL — SIGNIFICANT CHANGE UP (ref 2.5–4.5)
PLATELET # BLD AUTO: 218 K/UL — SIGNIFICANT CHANGE UP (ref 150–400)
POTASSIUM SERPL-MCNC: 4.1 MMOL/L — SIGNIFICANT CHANGE UP (ref 3.5–5.3)
POTASSIUM SERPL-SCNC: 4.1 MMOL/L — SIGNIFICANT CHANGE UP (ref 3.5–5.3)
RAPID RVP RESULT: DETECTED
RBC # BLD: 4.11 M/UL — LOW (ref 4.2–5.8)
RBC # FLD: 14.6 % — HIGH (ref 10.3–14.5)
SARS-COV-2 RNA SPEC QL NAA+PROBE: SIGNIFICANT CHANGE UP
SODIUM SERPL-SCNC: 141 MMOL/L — SIGNIFICANT CHANGE UP (ref 135–145)
SPECIMEN SOURCE: SIGNIFICANT CHANGE UP
WBC # BLD: 9.47 K/UL — SIGNIFICANT CHANGE UP (ref 3.8–10.5)
WBC # FLD AUTO: 9.47 K/UL — SIGNIFICANT CHANGE UP (ref 3.8–10.5)

## 2024-04-20 PROCEDURE — 99291 CRITICAL CARE FIRST HOUR: CPT

## 2024-04-20 RX ORDER — HUMAN INSULIN 100 [IU]/ML
5 INJECTION, SUSPENSION SUBCUTANEOUS EVERY 6 HOURS
Refills: 0 | Status: DISCONTINUED | OUTPATIENT
Start: 2024-04-20 | End: 2024-04-20

## 2024-04-20 RX ORDER — INSULIN GLARGINE 100 [IU]/ML
23 INJECTION, SOLUTION SUBCUTANEOUS AT BEDTIME
Refills: 0 | Status: DISCONTINUED | OUTPATIENT
Start: 2024-04-20 | End: 2024-04-20

## 2024-04-20 RX ORDER — VANCOMYCIN HCL 1 G
1250 VIAL (EA) INTRAVENOUS ONCE
Refills: 0 | Status: COMPLETED | OUTPATIENT
Start: 2024-04-20 | End: 2024-04-20

## 2024-04-20 RX ORDER — HUMAN INSULIN 100 [IU]/ML
8 INJECTION, SUSPENSION SUBCUTANEOUS EVERY 6 HOURS
Refills: 0 | Status: DISCONTINUED | OUTPATIENT
Start: 2024-04-20 | End: 2024-04-21

## 2024-04-20 RX ORDER — FUROSEMIDE 40 MG
40 TABLET ORAL ONCE
Refills: 0 | Status: COMPLETED | OUTPATIENT
Start: 2024-04-20 | End: 2024-04-20

## 2024-04-20 RX ADMIN — CHLORHEXIDINE GLUCONATE 1 APPLICATION(S): 213 SOLUTION TOPICAL at 05:37

## 2024-04-20 RX ADMIN — DEXMEDETOMIDINE HYDROCHLORIDE IN 0.9% SODIUM CHLORIDE 4.46 MICROGRAM(S)/KG/HR: 4 INJECTION INTRAVENOUS at 05:39

## 2024-04-20 RX ADMIN — DEXMEDETOMIDINE HYDROCHLORIDE IN 0.9% SODIUM CHLORIDE 4.46 MICROGRAM(S)/KG/HR: 4 INJECTION INTRAVENOUS at 13:51

## 2024-04-20 RX ADMIN — CHLORHEXIDINE GLUCONATE 15 MILLILITER(S): 213 SOLUTION TOPICAL at 17:30

## 2024-04-20 RX ADMIN — PIPERACILLIN AND TAZOBACTAM 25 GRAM(S): 4; .5 INJECTION, POWDER, LYOPHILIZED, FOR SOLUTION INTRAVENOUS at 22:17

## 2024-04-20 RX ADMIN — Medication 10: at 05:44

## 2024-04-20 RX ADMIN — POLYETHYLENE GLYCOL 3350 17 GRAM(S): 17 POWDER, FOR SOLUTION ORAL at 11:48

## 2024-04-20 RX ADMIN — Medication 166.67 MILLIGRAM(S): at 19:41

## 2024-04-20 RX ADMIN — LEVETIRACETAM 1000 MILLIGRAM(S): 250 TABLET, FILM COATED ORAL at 05:46

## 2024-04-20 RX ADMIN — DEXMEDETOMIDINE HYDROCHLORIDE IN 0.9% SODIUM CHLORIDE 4.46 MICROGRAM(S)/KG/HR: 4 INJECTION INTRAVENOUS at 16:00

## 2024-04-20 RX ADMIN — Medication 4 UNIT(S): at 12:01

## 2024-04-20 RX ADMIN — CHLORHEXIDINE GLUCONATE 15 MILLILITER(S): 213 SOLUTION TOPICAL at 05:38

## 2024-04-20 RX ADMIN — Medication 3 MILLILITER(S): at 17:34

## 2024-04-20 RX ADMIN — Medication 650 MILLIGRAM(S): at 13:45

## 2024-04-20 RX ADMIN — DEXMEDETOMIDINE HYDROCHLORIDE IN 0.9% SODIUM CHLORIDE 4.46 MICROGRAM(S)/KG/HR: 4 INJECTION INTRAVENOUS at 22:16

## 2024-04-20 RX ADMIN — Medication 4 UNIT(S): at 05:44

## 2024-04-20 RX ADMIN — Medication 40 MILLIGRAM(S): at 10:04

## 2024-04-20 RX ADMIN — PIPERACILLIN AND TAZOBACTAM 25 GRAM(S): 4; .5 INJECTION, POWDER, LYOPHILIZED, FOR SOLUTION INTRAVENOUS at 13:51

## 2024-04-20 RX ADMIN — Medication 650 MILLIGRAM(S): at 12:43

## 2024-04-20 RX ADMIN — HUMAN INSULIN 8 UNIT(S): 100 INJECTION, SUSPENSION SUBCUTANEOUS at 15:34

## 2024-04-20 RX ADMIN — HUMAN INSULIN 8 UNIT(S): 100 INJECTION, SUSPENSION SUBCUTANEOUS at 17:31

## 2024-04-20 RX ADMIN — APIXABAN 5 MILLIGRAM(S): 2.5 TABLET, FILM COATED ORAL at 17:34

## 2024-04-20 RX ADMIN — Medication 750 MILLIGRAM(S): at 11:51

## 2024-04-20 RX ADMIN — DEXMEDETOMIDINE HYDROCHLORIDE IN 0.9% SODIUM CHLORIDE 4.46 MICROGRAM(S)/KG/HR: 4 INJECTION INTRAVENOUS at 19:23

## 2024-04-20 RX ADMIN — Medication 3 MILLILITER(S): at 00:36

## 2024-04-20 RX ADMIN — Medication 81 MILLIGRAM(S): at 11:48

## 2024-04-20 RX ADMIN — Medication 10: at 11:48

## 2024-04-20 RX ADMIN — DEXMEDETOMIDINE HYDROCHLORIDE IN 0.9% SODIUM CHLORIDE 4.46 MICROGRAM(S)/KG/HR: 4 INJECTION INTRAVENOUS at 02:46

## 2024-04-20 RX ADMIN — Medication 25 MILLIGRAM(S): at 17:30

## 2024-04-20 RX ADMIN — ATORVASTATIN CALCIUM 80 MILLIGRAM(S): 80 TABLET, FILM COATED ORAL at 22:17

## 2024-04-20 RX ADMIN — Medication 3 MILLILITER(S): at 05:47

## 2024-04-20 RX ADMIN — DEXMEDETOMIDINE HYDROCHLORIDE IN 0.9% SODIUM CHLORIDE 4.46 MICROGRAM(S)/KG/HR: 4 INJECTION INTRAVENOUS at 11:12

## 2024-04-20 RX ADMIN — Medication 3 MILLILITER(S): at 23:17

## 2024-04-20 RX ADMIN — Medication 6: at 17:33

## 2024-04-20 RX ADMIN — PIPERACILLIN AND TAZOBACTAM 25 GRAM(S): 4; .5 INJECTION, POWDER, LYOPHILIZED, FOR SOLUTION INTRAVENOUS at 05:38

## 2024-04-20 RX ADMIN — Medication 3 MILLILITER(S): at 11:07

## 2024-04-20 RX ADMIN — Medication 25 MILLIGRAM(S): at 05:46

## 2024-04-20 RX ADMIN — APIXABAN 5 MILLIGRAM(S): 2.5 TABLET, FILM COATED ORAL at 05:46

## 2024-04-20 RX ADMIN — SENNA PLUS 2 TABLET(S): 8.6 TABLET ORAL at 22:17

## 2024-04-20 RX ADMIN — DEXMEDETOMIDINE HYDROCHLORIDE IN 0.9% SODIUM CHLORIDE 4.46 MICROGRAM(S)/KG/HR: 4 INJECTION INTRAVENOUS at 08:37

## 2024-04-20 RX ADMIN — TAMSULOSIN HYDROCHLORIDE 0.4 MILLIGRAM(S): 0.4 CAPSULE ORAL at 22:17

## 2024-04-20 RX ADMIN — LEVETIRACETAM 1000 MILLIGRAM(S): 250 TABLET, FILM COATED ORAL at 17:32

## 2024-04-20 NOTE — PROVIDER CONTACT NOTE (CRITICAL VALUE NOTIFICATION) - TEST AND RESULT REPORTED:
blood culture from 4/19 with growth in anerobic bottle with gram positive cocci in clusters blood culture from 4/19 with growth in anaerobic bottle with gram positive cocci in clusters

## 2024-04-20 NOTE — PROGRESS NOTE ADULT - ASSESSMENT
73-year-old male with a PMH of  DM, HTN, Seizure, TBI, COPD CHF cardiac arrest BPH admitted to [facility] for aspiration PNA. Patient admitted to ICU for hypoxic respiratory failure in setting of witnessed aspiration event on GMF.       - sedated with propofol/fent.   - daily SATs/SBTs  - c/w nebs. vent adjustment per ABG.  - No vasopressors at this time, maintaining MAP >65   -Diet: NPO. TF  - FU urine output   - replete lytes as appropriate   - Will continue Zosyn for now for aspiration pna  - Continue home eliquis for AF   - maintaining goal glucose<180 with ISS

## 2024-04-21 LAB
ALBUMIN SERPL ELPH-MCNC: 1.7 G/DL — LOW (ref 3.3–5)
ALP SERPL-CCNC: 86 U/L — SIGNIFICANT CHANGE UP (ref 40–120)
ALT FLD-CCNC: 19 U/L — SIGNIFICANT CHANGE UP (ref 12–78)
ANION GAP SERPL CALC-SCNC: 5 MMOL/L — SIGNIFICANT CHANGE UP (ref 5–17)
AST SERPL-CCNC: 16 U/L — SIGNIFICANT CHANGE UP (ref 15–37)
BILIRUB SERPL-MCNC: 0.2 MG/DL — SIGNIFICANT CHANGE UP (ref 0.2–1.2)
BUN SERPL-MCNC: 22 MG/DL — SIGNIFICANT CHANGE UP (ref 7–23)
CALCIUM SERPL-MCNC: 8.5 MG/DL — SIGNIFICANT CHANGE UP (ref 8.5–10.1)
CHLORIDE SERPL-SCNC: 105 MMOL/L — SIGNIFICANT CHANGE UP (ref 96–108)
CO2 SERPL-SCNC: 30 MMOL/L — SIGNIFICANT CHANGE UP (ref 22–31)
CREAT SERPL-MCNC: 0.87 MG/DL — SIGNIFICANT CHANGE UP (ref 0.5–1.3)
CULTURE RESULTS: NO GROWTH — SIGNIFICANT CHANGE UP
EGFR: 90 ML/MIN/1.73M2 — SIGNIFICANT CHANGE UP
GLUCOSE BLDC GLUCOMTR-MCNC: 194 MG/DL — HIGH (ref 70–99)
GLUCOSE BLDC GLUCOMTR-MCNC: 270 MG/DL — HIGH (ref 70–99)
GLUCOSE BLDC GLUCOMTR-MCNC: 326 MG/DL — HIGH (ref 70–99)
GLUCOSE BLDC GLUCOMTR-MCNC: 357 MG/DL — HIGH (ref 70–99)
GLUCOSE SERPL-MCNC: 373 MG/DL — HIGH (ref 70–99)
GRAM STN FLD: SIGNIFICANT CHANGE UP
HCT VFR BLD CALC: 38.3 % — LOW (ref 39–50)
HGB BLD-MCNC: 12.8 G/DL — LOW (ref 13–17)
MAGNESIUM SERPL-MCNC: 1.9 MG/DL — SIGNIFICANT CHANGE UP (ref 1.6–2.6)
MCHC RBC-ENTMCNC: 31.2 PG — SIGNIFICANT CHANGE UP (ref 27–34)
MCHC RBC-ENTMCNC: 33.4 G/DL — SIGNIFICANT CHANGE UP (ref 32–36)
MCV RBC AUTO: 93.4 FL — SIGNIFICANT CHANGE UP (ref 80–100)
NRBC # BLD: 0 /100 WBCS — SIGNIFICANT CHANGE UP (ref 0–0)
PHOSPHATE SERPL-MCNC: 2.9 MG/DL — SIGNIFICANT CHANGE UP (ref 2.5–4.5)
PLATELET # BLD AUTO: 221 K/UL — SIGNIFICANT CHANGE UP (ref 150–400)
POTASSIUM SERPL-MCNC: 3.8 MMOL/L — SIGNIFICANT CHANGE UP (ref 3.5–5.3)
POTASSIUM SERPL-SCNC: 3.8 MMOL/L — SIGNIFICANT CHANGE UP (ref 3.5–5.3)
PROT SERPL-MCNC: 6 GM/DL — SIGNIFICANT CHANGE UP (ref 6–8.3)
RBC # BLD: 4.1 M/UL — LOW (ref 4.2–5.8)
RBC # FLD: 14.5 % — SIGNIFICANT CHANGE UP (ref 10.3–14.5)
SODIUM SERPL-SCNC: 140 MMOL/L — SIGNIFICANT CHANGE UP (ref 135–145)
SPECIMEN SOURCE: SIGNIFICANT CHANGE UP
WBC # BLD: 8.62 K/UL — SIGNIFICANT CHANGE UP (ref 3.8–10.5)
WBC # FLD AUTO: 8.62 K/UL — SIGNIFICANT CHANGE UP (ref 3.8–10.5)

## 2024-04-21 PROCEDURE — 99291 CRITICAL CARE FIRST HOUR: CPT

## 2024-04-21 RX ORDER — INSULIN GLARGINE 100 [IU]/ML
30 INJECTION, SOLUTION SUBCUTANEOUS EVERY MORNING
Refills: 0 | Status: DISCONTINUED | OUTPATIENT
Start: 2024-04-22 | End: 2024-04-30

## 2024-04-21 RX ORDER — INSULIN LISPRO 100/ML
VIAL (ML) SUBCUTANEOUS EVERY 6 HOURS
Refills: 0 | Status: DISCONTINUED | OUTPATIENT
Start: 2024-04-21 | End: 2024-04-27

## 2024-04-21 RX ORDER — INSULIN LISPRO 100/ML
10 VIAL (ML) SUBCUTANEOUS EVERY 6 HOURS
Refills: 0 | Status: DISCONTINUED | OUTPATIENT
Start: 2024-04-21 | End: 2024-04-26

## 2024-04-21 RX ORDER — INSULIN LISPRO 100/ML
VIAL (ML) SUBCUTANEOUS
Refills: 0 | Status: DISCONTINUED | OUTPATIENT
Start: 2024-04-21 | End: 2024-04-21

## 2024-04-21 RX ORDER — LACTULOSE 10 G/15ML
10 SOLUTION ORAL ONCE
Refills: 0 | Status: COMPLETED | OUTPATIENT
Start: 2024-04-21 | End: 2024-04-21

## 2024-04-21 RX ORDER — INSULIN GLARGINE 100 [IU]/ML
30 INJECTION, SOLUTION SUBCUTANEOUS ONCE
Refills: 0 | Status: COMPLETED | OUTPATIENT
Start: 2024-04-21 | End: 2024-04-21

## 2024-04-21 RX ORDER — HUMAN INSULIN 100 [IU]/ML
10 INJECTION, SUSPENSION SUBCUTANEOUS EVERY 6 HOURS
Refills: 0 | Status: DISCONTINUED | OUTPATIENT
Start: 2024-04-21 | End: 2024-04-21

## 2024-04-21 RX ADMIN — DEXMEDETOMIDINE HYDROCHLORIDE IN 0.9% SODIUM CHLORIDE 4.46 MICROGRAM(S)/KG/HR: 4 INJECTION INTRAVENOUS at 17:13

## 2024-04-21 RX ADMIN — Medication 81 MILLIGRAM(S): at 12:19

## 2024-04-21 RX ADMIN — Medication 750 MILLIGRAM(S): at 12:19

## 2024-04-21 RX ADMIN — CHLORHEXIDINE GLUCONATE 15 MILLILITER(S): 213 SOLUTION TOPICAL at 17:12

## 2024-04-21 RX ADMIN — Medication 3 MILLILITER(S): at 06:19

## 2024-04-21 RX ADMIN — INSULIN GLARGINE 30 UNIT(S): 100 INJECTION, SOLUTION SUBCUTANEOUS at 10:27

## 2024-04-21 RX ADMIN — APIXABAN 5 MILLIGRAM(S): 2.5 TABLET, FILM COATED ORAL at 17:12

## 2024-04-21 RX ADMIN — Medication 3 MILLILITER(S): at 17:18

## 2024-04-21 RX ADMIN — SENNA PLUS 2 TABLET(S): 8.6 TABLET ORAL at 21:41

## 2024-04-21 RX ADMIN — Medication 650 MILLIGRAM(S): at 17:33

## 2024-04-21 RX ADMIN — Medication 6 UNIT(S): at 09:53

## 2024-04-21 RX ADMIN — Medication 6 UNIT(S): at 17:13

## 2024-04-21 RX ADMIN — DEXMEDETOMIDINE HYDROCHLORIDE IN 0.9% SODIUM CHLORIDE 4.46 MICROGRAM(S)/KG/HR: 4 INJECTION INTRAVENOUS at 05:59

## 2024-04-21 RX ADMIN — ATORVASTATIN CALCIUM 80 MILLIGRAM(S): 80 TABLET, FILM COATED ORAL at 21:41

## 2024-04-21 RX ADMIN — CHLORHEXIDINE GLUCONATE 1 APPLICATION(S): 213 SOLUTION TOPICAL at 06:00

## 2024-04-21 RX ADMIN — APIXABAN 5 MILLIGRAM(S): 2.5 TABLET, FILM COATED ORAL at 06:00

## 2024-04-21 RX ADMIN — DEXMEDETOMIDINE HYDROCHLORIDE IN 0.9% SODIUM CHLORIDE 4.46 MICROGRAM(S)/KG/HR: 4 INJECTION INTRAVENOUS at 08:30

## 2024-04-21 RX ADMIN — LACTULOSE 10 GRAM(S): 10 SOLUTION ORAL at 18:51

## 2024-04-21 RX ADMIN — DEXMEDETOMIDINE HYDROCHLORIDE IN 0.9% SODIUM CHLORIDE 4.46 MICROGRAM(S)/KG/HR: 4 INJECTION INTRAVENOUS at 21:40

## 2024-04-21 RX ADMIN — HUMAN INSULIN 8 UNIT(S): 100 INJECTION, SUSPENSION SUBCUTANEOUS at 00:48

## 2024-04-21 RX ADMIN — DEXMEDETOMIDINE HYDROCHLORIDE IN 0.9% SODIUM CHLORIDE 4.46 MICROGRAM(S)/KG/HR: 4 INJECTION INTRAVENOUS at 00:49

## 2024-04-21 RX ADMIN — DEXMEDETOMIDINE HYDROCHLORIDE IN 0.9% SODIUM CHLORIDE 4.46 MICROGRAM(S)/KG/HR: 4 INJECTION INTRAVENOUS at 20:21

## 2024-04-21 RX ADMIN — Medication 25 MILLIGRAM(S): at 06:00

## 2024-04-21 RX ADMIN — Medication 650 MILLIGRAM(S): at 18:33

## 2024-04-21 RX ADMIN — Medication 3 MILLILITER(S): at 11:42

## 2024-04-21 RX ADMIN — LEVETIRACETAM 1000 MILLIGRAM(S): 250 TABLET, FILM COATED ORAL at 06:00

## 2024-04-21 RX ADMIN — DEXMEDETOMIDINE HYDROCHLORIDE IN 0.9% SODIUM CHLORIDE 4.46 MICROGRAM(S)/KG/HR: 4 INJECTION INTRAVENOUS at 14:57

## 2024-04-21 RX ADMIN — Medication 10: at 05:59

## 2024-04-21 RX ADMIN — LEVETIRACETAM 1000 MILLIGRAM(S): 250 TABLET, FILM COATED ORAL at 18:35

## 2024-04-21 RX ADMIN — Medication 2: at 17:13

## 2024-04-21 RX ADMIN — Medication 25 MILLIGRAM(S): at 17:12

## 2024-04-21 RX ADMIN — TAMSULOSIN HYDROCHLORIDE 0.4 MILLIGRAM(S): 0.4 CAPSULE ORAL at 21:40

## 2024-04-21 RX ADMIN — Medication 6: at 12:19

## 2024-04-21 RX ADMIN — HUMAN INSULIN 8 UNIT(S): 100 INJECTION, SUSPENSION SUBCUTANEOUS at 06:01

## 2024-04-21 RX ADMIN — POLYETHYLENE GLYCOL 3350 17 GRAM(S): 17 POWDER, FOR SOLUTION ORAL at 12:19

## 2024-04-21 RX ADMIN — CHLORHEXIDINE GLUCONATE 15 MILLILITER(S): 213 SOLUTION TOPICAL at 06:00

## 2024-04-21 RX ADMIN — PIPERACILLIN AND TAZOBACTAM 25 GRAM(S): 4; .5 INJECTION, POWDER, LYOPHILIZED, FOR SOLUTION INTRAVENOUS at 06:00

## 2024-04-21 RX ADMIN — Medication 8: at 00:48

## 2024-04-21 NOTE — PROGRESS NOTE ADULT - ASSESSMENT
73-year-old male with a PMH of  DM, HTN, Seizure, TBI, COPD CHF cardiac arrest BPH admitted to [facility] for aspiration PNA. Patient admitted to ICU for hypoxic respiratory failure in setting of witnessed aspiration event on GMF.       - sedated with propofol/fent.   - daily SATs/SBTs, awake patient afebrile for 24 hours for extubation  - c/w nebs. vent adjustment per ABG.  - No vasopressors at this time, maintaining MAP >65   -Diet: NPO. TF  - FU urine output   - replete lytes as appropriate   - Will continue Zosyn for now for aspiration pna  - Continue home eliquis for AF   - maintaining goal glucose<180 with ISS

## 2024-04-21 NOTE — PHYSICAL THERAPY INITIAL EVALUATION ADULT - ADDITIONAL COMMENTS
Patient is a long term care patient at [facility]. Per JULIUS Martinez per son patient was able to ambulate a short distance with rolling walker.

## 2024-04-21 NOTE — PHYSICAL THERAPY INITIAL EVALUATION ADULT - GENERAL OBSERVATIONS, REHAB EVAL
Patient encountered supine in bed, +ETT with a PEEP of 5 and FiO2 of 25%, sedated on precedex, +condom catheter, +B CAIR boots, +IV, in no distress, able to follow simple commands.

## 2024-04-22 LAB
-  CLINDAMYCIN: SIGNIFICANT CHANGE UP
-  ERYTHROMYCIN: SIGNIFICANT CHANGE UP
-  GENTAMICIN: SIGNIFICANT CHANGE UP
-  OXACILLIN: SIGNIFICANT CHANGE UP
-  PENICILLIN: SIGNIFICANT CHANGE UP
-  RIFAMPIN: SIGNIFICANT CHANGE UP
-  TETRACYCLINE: SIGNIFICANT CHANGE UP
-  TRIMETHOPRIM/SULFAMETHOXAZOLE: SIGNIFICANT CHANGE UP
-  VANCOMYCIN: SIGNIFICANT CHANGE UP
ALBUMIN SERPL ELPH-MCNC: 1.6 G/DL — LOW (ref 3.3–5)
ALP SERPL-CCNC: 67 U/L — SIGNIFICANT CHANGE UP (ref 40–120)
ALT FLD-CCNC: 17 U/L — SIGNIFICANT CHANGE UP (ref 12–78)
ANION GAP SERPL CALC-SCNC: 10 MMOL/L — SIGNIFICANT CHANGE UP (ref 5–17)
AST SERPL-CCNC: 18 U/L — SIGNIFICANT CHANGE UP (ref 15–37)
BASOPHILS # BLD AUTO: 0.08 K/UL — SIGNIFICANT CHANGE UP (ref 0–0.2)
BASOPHILS NFR BLD AUTO: 0.7 % — SIGNIFICANT CHANGE UP (ref 0–2)
BILIRUB SERPL-MCNC: 0.2 MG/DL — SIGNIFICANT CHANGE UP (ref 0.2–1.2)
BUN SERPL-MCNC: 21 MG/DL — SIGNIFICANT CHANGE UP (ref 7–23)
CALCIUM SERPL-MCNC: 8.8 MG/DL — SIGNIFICANT CHANGE UP (ref 8.5–10.1)
CHLORIDE SERPL-SCNC: 108 MMOL/L — SIGNIFICANT CHANGE UP (ref 96–108)
CO2 SERPL-SCNC: 29 MMOL/L — SIGNIFICANT CHANGE UP (ref 22–31)
CREAT SERPL-MCNC: 0.82 MG/DL — SIGNIFICANT CHANGE UP (ref 0.5–1.3)
CULTURE RESULTS: ABNORMAL
CULTURE RESULTS: SIGNIFICANT CHANGE UP
EGFR: 92 ML/MIN/1.73M2 — SIGNIFICANT CHANGE UP
EOSINOPHIL # BLD AUTO: 0.96 K/UL — HIGH (ref 0–0.5)
EOSINOPHIL NFR BLD AUTO: 9 % — HIGH (ref 0–6)
GLUCOSE BLDC GLUCOMTR-MCNC: 136 MG/DL — HIGH (ref 70–99)
GLUCOSE BLDC GLUCOMTR-MCNC: 150 MG/DL — HIGH (ref 70–99)
GLUCOSE BLDC GLUCOMTR-MCNC: 185 MG/DL — HIGH (ref 70–99)
GLUCOSE BLDC GLUCOMTR-MCNC: 192 MG/DL — HIGH (ref 70–99)
GLUCOSE BLDC GLUCOMTR-MCNC: 268 MG/DL — HIGH (ref 70–99)
GLUCOSE SERPL-MCNC: 223 MG/DL — HIGH (ref 70–99)
GRAM STN FLD: ABNORMAL
HCT VFR BLD CALC: 39.1 % — SIGNIFICANT CHANGE UP (ref 39–50)
HGB BLD-MCNC: 12.6 G/DL — LOW (ref 13–17)
IMM GRANULOCYTES NFR BLD AUTO: 0.3 % — SIGNIFICANT CHANGE UP (ref 0–0.9)
LYMPHOCYTES # BLD AUTO: 2.8 K/UL — SIGNIFICANT CHANGE UP (ref 1–3.3)
LYMPHOCYTES # BLD AUTO: 26.2 % — SIGNIFICANT CHANGE UP (ref 13–44)
MAGNESIUM SERPL-MCNC: 2.1 MG/DL — SIGNIFICANT CHANGE UP (ref 1.6–2.6)
MCHC RBC-ENTMCNC: 30.1 PG — SIGNIFICANT CHANGE UP (ref 27–34)
MCHC RBC-ENTMCNC: 32.2 G/DL — SIGNIFICANT CHANGE UP (ref 32–36)
MCV RBC AUTO: 93.5 FL — SIGNIFICANT CHANGE UP (ref 80–100)
METHOD TYPE: SIGNIFICANT CHANGE UP
MONOCYTES # BLD AUTO: 0.84 K/UL — SIGNIFICANT CHANGE UP (ref 0–0.9)
MONOCYTES NFR BLD AUTO: 7.9 % — SIGNIFICANT CHANGE UP (ref 2–14)
NEUTROPHILS # BLD AUTO: 5.96 K/UL — SIGNIFICANT CHANGE UP (ref 1.8–7.4)
NEUTROPHILS NFR BLD AUTO: 55.9 % — SIGNIFICANT CHANGE UP (ref 43–77)
NRBC # BLD: 0 /100 WBCS — SIGNIFICANT CHANGE UP (ref 0–0)
ORGANISM # SPEC MICROSCOPIC CNT: ABNORMAL
ORGANISM # SPEC MICROSCOPIC CNT: SIGNIFICANT CHANGE UP
PHOSPHATE SERPL-MCNC: 2.9 MG/DL — SIGNIFICANT CHANGE UP (ref 2.5–4.5)
PLATELET # BLD AUTO: 242 K/UL — SIGNIFICANT CHANGE UP (ref 150–400)
POTASSIUM SERPL-MCNC: 3.7 MMOL/L — SIGNIFICANT CHANGE UP (ref 3.5–5.3)
POTASSIUM SERPL-SCNC: 3.7 MMOL/L — SIGNIFICANT CHANGE UP (ref 3.5–5.3)
PROT SERPL-MCNC: 6 GM/DL — SIGNIFICANT CHANGE UP (ref 6–8.3)
RBC # BLD: 4.18 M/UL — LOW (ref 4.2–5.8)
RBC # FLD: 14.4 % — SIGNIFICANT CHANGE UP (ref 10.3–14.5)
SODIUM SERPL-SCNC: 147 MMOL/L — HIGH (ref 135–145)
SPECIMEN SOURCE: SIGNIFICANT CHANGE UP
SPECIMEN SOURCE: SIGNIFICANT CHANGE UP
WBC # BLD: 10.67 K/UL — HIGH (ref 3.8–10.5)
WBC # FLD AUTO: 10.67 K/UL — HIGH (ref 3.8–10.5)

## 2024-04-22 PROCEDURE — 99291 CRITICAL CARE FIRST HOUR: CPT

## 2024-04-22 PROCEDURE — 71045 X-RAY EXAM CHEST 1 VIEW: CPT | Mod: 26

## 2024-04-22 RX ORDER — ASPIRIN/CALCIUM CARB/MAGNESIUM 324 MG
81 TABLET ORAL DAILY
Refills: 0 | Status: DISCONTINUED | OUTPATIENT
Start: 2024-04-22 | End: 2024-05-02

## 2024-04-22 RX ORDER — DOXAZOSIN MESYLATE 4 MG
1 TABLET ORAL AT BEDTIME
Refills: 0 | Status: DISCONTINUED | OUTPATIENT
Start: 2024-04-22 | End: 2024-05-02

## 2024-04-22 RX ADMIN — CHLORHEXIDINE GLUCONATE 15 MILLILITER(S): 213 SOLUTION TOPICAL at 18:05

## 2024-04-22 RX ADMIN — Medication 3 MILLILITER(S): at 06:04

## 2024-04-22 RX ADMIN — LEVETIRACETAM 1000 MILLIGRAM(S): 250 TABLET, FILM COATED ORAL at 05:20

## 2024-04-22 RX ADMIN — APIXABAN 5 MILLIGRAM(S): 2.5 TABLET, FILM COATED ORAL at 18:07

## 2024-04-22 RX ADMIN — DEXMEDETOMIDINE HYDROCHLORIDE IN 0.9% SODIUM CHLORIDE 4.46 MICROGRAM(S)/KG/HR: 4 INJECTION INTRAVENOUS at 06:51

## 2024-04-22 RX ADMIN — Medication 3 MILLILITER(S): at 11:48

## 2024-04-22 RX ADMIN — LEVETIRACETAM 1000 MILLIGRAM(S): 250 TABLET, FILM COATED ORAL at 18:06

## 2024-04-22 RX ADMIN — Medication 3 MILLILITER(S): at 17:05

## 2024-04-22 RX ADMIN — Medication 25 MILLIGRAM(S): at 18:05

## 2024-04-22 RX ADMIN — Medication 6 UNIT(S): at 05:21

## 2024-04-22 RX ADMIN — Medication 1 MILLIGRAM(S): at 21:19

## 2024-04-22 RX ADMIN — DEXMEDETOMIDINE HYDROCHLORIDE IN 0.9% SODIUM CHLORIDE 4.46 MICROGRAM(S)/KG/HR: 4 INJECTION INTRAVENOUS at 05:19

## 2024-04-22 RX ADMIN — APIXABAN 5 MILLIGRAM(S): 2.5 TABLET, FILM COATED ORAL at 05:20

## 2024-04-22 RX ADMIN — Medication 6 UNIT(S): at 01:26

## 2024-04-22 RX ADMIN — Medication 3 MILLILITER(S): at 23:00

## 2024-04-22 RX ADMIN — DEXMEDETOMIDINE HYDROCHLORIDE IN 0.9% SODIUM CHLORIDE 4.46 MICROGRAM(S)/KG/HR: 4 INJECTION INTRAVENOUS at 18:05

## 2024-04-22 RX ADMIN — SENNA PLUS 2 TABLET(S): 8.6 TABLET ORAL at 21:19

## 2024-04-22 RX ADMIN — POLYETHYLENE GLYCOL 3350 17 GRAM(S): 17 POWDER, FOR SOLUTION ORAL at 12:43

## 2024-04-22 RX ADMIN — ATORVASTATIN CALCIUM 80 MILLIGRAM(S): 80 TABLET, FILM COATED ORAL at 21:19

## 2024-04-22 RX ADMIN — DEXMEDETOMIDINE HYDROCHLORIDE IN 0.9% SODIUM CHLORIDE 4.46 MICROGRAM(S)/KG/HR: 4 INJECTION INTRAVENOUS at 08:05

## 2024-04-22 RX ADMIN — Medication 81 MILLIGRAM(S): at 12:43

## 2024-04-22 RX ADMIN — Medication 6 UNIT(S): at 18:05

## 2024-04-22 RX ADMIN — Medication 6: at 12:41

## 2024-04-22 RX ADMIN — DEXMEDETOMIDINE HYDROCHLORIDE IN 0.9% SODIUM CHLORIDE 4.46 MICROGRAM(S)/KG/HR: 4 INJECTION INTRAVENOUS at 00:26

## 2024-04-22 RX ADMIN — CHLORHEXIDINE GLUCONATE 1 APPLICATION(S): 213 SOLUTION TOPICAL at 05:21

## 2024-04-22 RX ADMIN — DEXMEDETOMIDINE HYDROCHLORIDE IN 0.9% SODIUM CHLORIDE 4.46 MICROGRAM(S)/KG/HR: 4 INJECTION INTRAVENOUS at 20:56

## 2024-04-22 RX ADMIN — Medication 6 UNIT(S): at 12:42

## 2024-04-22 RX ADMIN — Medication 2: at 05:20

## 2024-04-22 RX ADMIN — INSULIN GLARGINE 30 UNIT(S): 100 INJECTION, SOLUTION SUBCUTANEOUS at 09:05

## 2024-04-22 RX ADMIN — Medication 3 MILLILITER(S): at 00:49

## 2024-04-22 RX ADMIN — Medication 750 MILLIGRAM(S): at 12:43

## 2024-04-22 RX ADMIN — Medication 2: at 01:25

## 2024-04-22 RX ADMIN — Medication 25 MILLIGRAM(S): at 05:20

## 2024-04-22 RX ADMIN — CHLORHEXIDINE GLUCONATE 15 MILLILITER(S): 213 SOLUTION TOPICAL at 05:20

## 2024-04-22 NOTE — PHARMACOTHERAPY INTERVENTION NOTE - COMMENTS
Recommended switching tamsulosin 0.4 mg to doxazosin 1 mg for BPH since tamsulosin cannot be crushed, and patient's diet is NPO with TF.

## 2024-04-22 NOTE — OCCUPATIONAL THERAPY INITIAL EVALUATION ADULT - IMPAIRMENTS CONTRIBUTING IMPAIRED BED MOBILITY, REHAB EVAL
ataxic/impaired coordination/decreased flexibility/impaired motor control/decreased ROM/impaired sensory feedback/decreased strength

## 2024-04-22 NOTE — OCCUPATIONAL THERAPY INITIAL EVALUATION ADULT - PERTINENT HX OF CURRENT PROBLEM, REHAB EVAL
As per EMR; 73-year-old male with a PMH of  DM, HTN, Seizure, TBI, COPD CHF cardiac arrest BPH admitted to [facility] for aspiration PNA. Patient admitted to ICU for hypoxic respiratory failure in setting of witnessed aspiration event on GMF. Pt intubated and sedated.

## 2024-04-22 NOTE — OCCUPATIONAL THERAPY INITIAL EVALUATION ADULT - RANGE OF MOTION EXAMINATION, LOWER EXTREMITY
BLE AROM grossly impaired throughout./bilateral LE Passive ROM was WFL  (within functional limits)
Moderate TTP to RLQ, no rebound, and no guarding. No masses. +Bowel sounds.

## 2024-04-22 NOTE — OCCUPATIONAL THERAPY INITIAL EVALUATION ADULT - ADDITIONAL COMMENTS
As per Low Quinn, Pt is a long term resident at [facility]. Pt was able to perform bed mobility and sit to stand transfer with rolling walker with assist. Unable to take steps.

## 2024-04-22 NOTE — PHARMACOTHERAPY INTERVENTION NOTE - COMMENTS
Recommended switching aspirin enteric coated to chewable since patient's diet is NPO with TF, and this medication cannot be crushed.

## 2024-04-22 NOTE — OCCUPATIONAL THERAPY INITIAL EVALUATION ADULT - GENERAL OBSERVATIONS, REHAB EVAL
Pt was encountered supine in bed with RT Ervin present; NAD, intubated to vent (PEEP 5 FIO2 10%) NG tube +, cardiac monitor +, continuous pulse ox +, rectal temp +, BLE CAIR boots +, araiza +, alert, followed commands, cooperative; No observed pain.

## 2024-04-23 LAB
ALBUMIN SERPL ELPH-MCNC: 1.7 G/DL — LOW (ref 3.3–5)
ALP SERPL-CCNC: 76 U/L — SIGNIFICANT CHANGE UP (ref 40–120)
ALT FLD-CCNC: 22 U/L — SIGNIFICANT CHANGE UP (ref 12–78)
ANION GAP SERPL CALC-SCNC: 10 MMOL/L — SIGNIFICANT CHANGE UP (ref 5–17)
AST SERPL-CCNC: 28 U/L — SIGNIFICANT CHANGE UP (ref 15–37)
BASE EXCESS BLDA CALC-SCNC: 13.7 MMOL/L — HIGH (ref -2–3)
BASOPHILS # BLD AUTO: 0.08 K/UL — SIGNIFICANT CHANGE UP (ref 0–0.2)
BASOPHILS NFR BLD AUTO: 0.7 % — SIGNIFICANT CHANGE UP (ref 0–2)
BILIRUB SERPL-MCNC: 0.2 MG/DL — SIGNIFICANT CHANGE UP (ref 0.2–1.2)
BLOOD GAS COMMENTS ARTERIAL: SIGNIFICANT CHANGE UP
BUN SERPL-MCNC: 23 MG/DL — SIGNIFICANT CHANGE UP (ref 7–23)
CALCIUM SERPL-MCNC: 8.6 MG/DL — SIGNIFICANT CHANGE UP (ref 8.5–10.1)
CHLORIDE SERPL-SCNC: 107 MMOL/L — SIGNIFICANT CHANGE UP (ref 96–108)
CO2 BLDA-SCNC: 39 MMOL/L — HIGH (ref 19–24)
CO2 SERPL-SCNC: 28 MMOL/L — SIGNIFICANT CHANGE UP (ref 22–31)
CREAT SERPL-MCNC: 0.83 MG/DL — SIGNIFICANT CHANGE UP (ref 0.5–1.3)
EGFR: 91 ML/MIN/1.73M2 — SIGNIFICANT CHANGE UP
EOSINOPHIL # BLD AUTO: 0.64 K/UL — HIGH (ref 0–0.5)
EOSINOPHIL NFR BLD AUTO: 6 % — SIGNIFICANT CHANGE UP (ref 0–6)
GAS PNL BLDA: SIGNIFICANT CHANGE UP
GLUCOSE BLDC GLUCOMTR-MCNC: 141 MG/DL — HIGH (ref 70–99)
GLUCOSE BLDC GLUCOMTR-MCNC: 166 MG/DL — HIGH (ref 70–99)
GLUCOSE BLDC GLUCOMTR-MCNC: 197 MG/DL — HIGH (ref 70–99)
GLUCOSE BLDC GLUCOMTR-MCNC: 224 MG/DL — HIGH (ref 70–99)
GLUCOSE SERPL-MCNC: 196 MG/DL — HIGH (ref 70–99)
HCO3 BLDA-SCNC: 38 MMOL/L — HIGH (ref 21–28)
HCT VFR BLD CALC: 38.5 % — LOW (ref 39–50)
HGB BLD-MCNC: 12.5 G/DL — LOW (ref 13–17)
HOROWITZ INDEX BLDA+IHG-RTO: 25 — SIGNIFICANT CHANGE UP
IMM GRANULOCYTES NFR BLD AUTO: 0.5 % — SIGNIFICANT CHANGE UP (ref 0–0.9)
LYMPHOCYTES # BLD AUTO: 2.31 K/UL — SIGNIFICANT CHANGE UP (ref 1–3.3)
LYMPHOCYTES # BLD AUTO: 21.6 % — SIGNIFICANT CHANGE UP (ref 13–44)
MAGNESIUM SERPL-MCNC: 2.2 MG/DL — SIGNIFICANT CHANGE UP (ref 1.6–2.6)
MCHC RBC-ENTMCNC: 30.1 PG — SIGNIFICANT CHANGE UP (ref 27–34)
MCHC RBC-ENTMCNC: 32.5 G/DL — SIGNIFICANT CHANGE UP (ref 32–36)
MCV RBC AUTO: 92.8 FL — SIGNIFICANT CHANGE UP (ref 80–100)
MONOCYTES # BLD AUTO: 0.73 K/UL — SIGNIFICANT CHANGE UP (ref 0–0.9)
MONOCYTES NFR BLD AUTO: 6.8 % — SIGNIFICANT CHANGE UP (ref 2–14)
NEUTROPHILS # BLD AUTO: 6.87 K/UL — SIGNIFICANT CHANGE UP (ref 1.8–7.4)
NEUTROPHILS NFR BLD AUTO: 64.4 % — SIGNIFICANT CHANGE UP (ref 43–77)
NRBC # BLD: 0 /100 WBCS — SIGNIFICANT CHANGE UP (ref 0–0)
PCO2 BLDA: 43 MMHG — SIGNIFICANT CHANGE UP (ref 32–46)
PH BLDA: 7.55 — HIGH (ref 7.35–7.45)
PHOSPHATE SERPL-MCNC: 2.5 MG/DL — SIGNIFICANT CHANGE UP (ref 2.5–4.5)
PLATELET # BLD AUTO: 264 K/UL — SIGNIFICANT CHANGE UP (ref 150–400)
PO2 BLDA: 78 MMHG — LOW (ref 83–108)
POTASSIUM SERPL-MCNC: 4.1 MMOL/L — SIGNIFICANT CHANGE UP (ref 3.5–5.3)
POTASSIUM SERPL-SCNC: 4.1 MMOL/L — SIGNIFICANT CHANGE UP (ref 3.5–5.3)
PROT SERPL-MCNC: 6.1 GM/DL — SIGNIFICANT CHANGE UP (ref 6–8.3)
RBC # BLD: 4.15 M/UL — LOW (ref 4.2–5.8)
RBC # FLD: 14.4 % — SIGNIFICANT CHANGE UP (ref 10.3–14.5)
SAO2 % BLDA: 96.6 % — SIGNIFICANT CHANGE UP (ref 94–98)
SODIUM SERPL-SCNC: 145 MMOL/L — SIGNIFICANT CHANGE UP (ref 135–145)
WBC # BLD: 10.68 K/UL — HIGH (ref 3.8–10.5)
WBC # FLD AUTO: 10.68 K/UL — HIGH (ref 3.8–10.5)

## 2024-04-23 PROCEDURE — 99291 CRITICAL CARE FIRST HOUR: CPT

## 2024-04-23 RX ORDER — FUROSEMIDE 40 MG
40 TABLET ORAL ONCE
Refills: 0 | Status: COMPLETED | OUTPATIENT
Start: 2024-04-23 | End: 2024-04-23

## 2024-04-23 RX ORDER — HYDRALAZINE HCL 50 MG
10 TABLET ORAL ONCE
Refills: 0 | Status: COMPLETED | OUTPATIENT
Start: 2024-04-23 | End: 2024-04-23

## 2024-04-23 RX ADMIN — Medication 6 UNIT(S): at 05:58

## 2024-04-23 RX ADMIN — Medication 1 MILLIGRAM(S): at 21:27

## 2024-04-23 RX ADMIN — Medication 650 MILLIGRAM(S): at 18:40

## 2024-04-23 RX ADMIN — ATORVASTATIN CALCIUM 80 MILLIGRAM(S): 80 TABLET, FILM COATED ORAL at 21:28

## 2024-04-23 RX ADMIN — Medication 650 MILLIGRAM(S): at 17:30

## 2024-04-23 RX ADMIN — DEXMEDETOMIDINE HYDROCHLORIDE IN 0.9% SODIUM CHLORIDE 4.46 MICROGRAM(S)/KG/HR: 4 INJECTION INTRAVENOUS at 05:56

## 2024-04-23 RX ADMIN — APIXABAN 5 MILLIGRAM(S): 2.5 TABLET, FILM COATED ORAL at 05:57

## 2024-04-23 RX ADMIN — Medication 650 MILLIGRAM(S): at 11:25

## 2024-04-23 RX ADMIN — POLYETHYLENE GLYCOL 3350 17 GRAM(S): 17 POWDER, FOR SOLUTION ORAL at 11:10

## 2024-04-23 RX ADMIN — Medication 2: at 23:16

## 2024-04-23 RX ADMIN — Medication 3 MILLILITER(S): at 23:08

## 2024-04-23 RX ADMIN — Medication 25 MILLIGRAM(S): at 06:02

## 2024-04-23 RX ADMIN — Medication 3 MILLILITER(S): at 17:08

## 2024-04-23 RX ADMIN — LEVETIRACETAM 1000 MILLIGRAM(S): 250 TABLET, FILM COATED ORAL at 17:30

## 2024-04-23 RX ADMIN — CHLORHEXIDINE GLUCONATE 15 MILLILITER(S): 213 SOLUTION TOPICAL at 11:09

## 2024-04-23 RX ADMIN — Medication 6 UNIT(S): at 11:14

## 2024-04-23 RX ADMIN — DEXMEDETOMIDINE HYDROCHLORIDE IN 0.9% SODIUM CHLORIDE 4.46 MICROGRAM(S)/KG/HR: 4 INJECTION INTRAVENOUS at 02:51

## 2024-04-23 RX ADMIN — Medication 6 UNIT(S): at 00:00

## 2024-04-23 RX ADMIN — LEVETIRACETAM 1000 MILLIGRAM(S): 250 TABLET, FILM COATED ORAL at 05:57

## 2024-04-23 RX ADMIN — CHLORHEXIDINE GLUCONATE 1 APPLICATION(S): 213 SOLUTION TOPICAL at 05:58

## 2024-04-23 RX ADMIN — DEXMEDETOMIDINE HYDROCHLORIDE IN 0.9% SODIUM CHLORIDE 4.46 MICROGRAM(S)/KG/HR: 4 INJECTION INTRAVENOUS at 00:01

## 2024-04-23 RX ADMIN — Medication 3 MILLILITER(S): at 05:01

## 2024-04-23 RX ADMIN — Medication 650 MILLIGRAM(S): at 10:28

## 2024-04-23 RX ADMIN — Medication 4: at 05:57

## 2024-04-23 RX ADMIN — INSULIN GLARGINE 30 UNIT(S): 100 INJECTION, SOLUTION SUBCUTANEOUS at 08:42

## 2024-04-23 RX ADMIN — Medication 750 MILLIGRAM(S): at 11:09

## 2024-04-23 RX ADMIN — Medication 3 MILLILITER(S): at 11:49

## 2024-04-23 RX ADMIN — Medication 10 MILLIGRAM(S): at 01:34

## 2024-04-23 RX ADMIN — CHLORHEXIDINE GLUCONATE 15 MILLILITER(S): 213 SOLUTION TOPICAL at 05:56

## 2024-04-23 RX ADMIN — Medication 25 MILLIGRAM(S): at 17:30

## 2024-04-23 RX ADMIN — APIXABAN 5 MILLIGRAM(S): 2.5 TABLET, FILM COATED ORAL at 17:30

## 2024-04-23 RX ADMIN — Medication 8 UNIT(S): at 18:30

## 2024-04-23 RX ADMIN — Medication 81 MILLIGRAM(S): at 11:10

## 2024-04-23 RX ADMIN — Medication 8 UNIT(S): at 23:16

## 2024-04-23 RX ADMIN — Medication 40 MILLIGRAM(S): at 11:48

## 2024-04-23 RX ADMIN — DEXMEDETOMIDINE HYDROCHLORIDE IN 0.9% SODIUM CHLORIDE 4.46 MICROGRAM(S)/KG/HR: 4 INJECTION INTRAVENOUS at 08:43

## 2024-04-23 RX ADMIN — Medication 2: at 11:13

## 2024-04-23 NOTE — PHARMACOTHERAPY INTERVENTION NOTE - INTERVENTION TYPE RECOOMEND
Dose Optimization/Non-renal Dose Adjustment
Therapy Recommended - Alternative treatment
Dose Optimization/Non-renal Dose Adjustment
Therapy Recommended - Additional therapy
Therapy Recommended - Alternative treatment

## 2024-04-23 NOTE — PHARMACOTHERAPY INTERVENTION NOTE - COMMENTS
Recommended increasing the lispro 6 units q6h to 8 units q6h for hyperglycemia (BG >200). Patient received 8 units of ISS yesterday, and 6 units of ISS today.

## 2024-04-23 NOTE — PROGRESS NOTE ADULT - ASSESSMENT
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 s/p trach/PEG on 12/5. Pt initially felt to have anoxic encephalopathy but MS improved. Eventually pt with removal of trach and PEG. Pt transferred fro [facility] on 4/14 2 to vomiting while on BiPAP; admitted with working dx of aspiration PNA. Today RRT called 2 to hypoxemia which improved, resp distress, lethargy and HTN. In the ICU pt intubated. ICU dx: Acute hypoxic and hypercapneic resp failure requiring intubation possibly 2 to aspiration    Resp: Weaning on CPAP 5 + PS 5; extubate (1 dose Lasix ordered prior to extubation)  ID: Off Abx  CVS: Cont pt BB  HEME: Cont Eliquis  FEN: Cont enteral feeds  Endo: May need to decrease Lantus and follow FS  Renal: Follow BUN/Cr and UO  Neuro: Cont pt's Valproic acid and Keppra/ May dc Precedex  Social: Pt is full code/ Son at the bedside and updated

## 2024-04-24 LAB
ALBUMIN SERPL ELPH-MCNC: 2 G/DL — LOW (ref 3.3–5)
ALP SERPL-CCNC: 76 U/L — SIGNIFICANT CHANGE UP (ref 40–120)
ALT FLD-CCNC: 25 U/L — SIGNIFICANT CHANGE UP (ref 12–78)
ANION GAP SERPL CALC-SCNC: 7 MMOL/L — SIGNIFICANT CHANGE UP (ref 5–17)
AST SERPL-CCNC: 35 U/L — SIGNIFICANT CHANGE UP (ref 15–37)
BASOPHILS # BLD AUTO: 0.06 K/UL — SIGNIFICANT CHANGE UP (ref 0–0.2)
BASOPHILS NFR BLD AUTO: 0.4 % — SIGNIFICANT CHANGE UP (ref 0–2)
BILIRUB SERPL-MCNC: 0.2 MG/DL — SIGNIFICANT CHANGE UP (ref 0.2–1.2)
BUN SERPL-MCNC: 27 MG/DL — HIGH (ref 7–23)
CALCIUM SERPL-MCNC: 9.1 MG/DL — SIGNIFICANT CHANGE UP (ref 8.5–10.1)
CHLORIDE SERPL-SCNC: 105 MMOL/L — SIGNIFICANT CHANGE UP (ref 96–108)
CO2 SERPL-SCNC: 32 MMOL/L — HIGH (ref 22–31)
CREAT SERPL-MCNC: 0.94 MG/DL — SIGNIFICANT CHANGE UP (ref 0.5–1.3)
CULTURE RESULTS: SIGNIFICANT CHANGE UP
EGFR: 85 ML/MIN/1.73M2 — SIGNIFICANT CHANGE UP
EOSINOPHIL # BLD AUTO: 0.2 K/UL — SIGNIFICANT CHANGE UP (ref 0–0.5)
EOSINOPHIL NFR BLD AUTO: 1.3 % — SIGNIFICANT CHANGE UP (ref 0–6)
GLUCOSE BLDC GLUCOMTR-MCNC: 177 MG/DL — HIGH (ref 70–99)
GLUCOSE BLDC GLUCOMTR-MCNC: 212 MG/DL — HIGH (ref 70–99)
GLUCOSE BLDC GLUCOMTR-MCNC: 255 MG/DL — HIGH (ref 70–99)
GLUCOSE SERPL-MCNC: 203 MG/DL — HIGH (ref 70–99)
HCT VFR BLD CALC: 41.7 % — SIGNIFICANT CHANGE UP (ref 39–50)
HGB BLD-MCNC: 13.3 G/DL — SIGNIFICANT CHANGE UP (ref 13–17)
IMM GRANULOCYTES NFR BLD AUTO: 0.6 % — SIGNIFICANT CHANGE UP (ref 0–0.9)
LYMPHOCYTES # BLD AUTO: 1.68 K/UL — SIGNIFICANT CHANGE UP (ref 1–3.3)
LYMPHOCYTES # BLD AUTO: 11.1 % — LOW (ref 13–44)
MAGNESIUM SERPL-MCNC: 2.4 MG/DL — SIGNIFICANT CHANGE UP (ref 1.6–2.6)
MCHC RBC-ENTMCNC: 29.8 PG — SIGNIFICANT CHANGE UP (ref 27–34)
MCHC RBC-ENTMCNC: 31.9 G/DL — LOW (ref 32–36)
MCV RBC AUTO: 93.5 FL — SIGNIFICANT CHANGE UP (ref 80–100)
MONOCYTES # BLD AUTO: 1.15 K/UL — HIGH (ref 0–0.9)
MONOCYTES NFR BLD AUTO: 7.6 % — SIGNIFICANT CHANGE UP (ref 2–14)
NEUTROPHILS # BLD AUTO: 11.9 K/UL — HIGH (ref 1.8–7.4)
NEUTROPHILS NFR BLD AUTO: 79 % — HIGH (ref 43–77)
NRBC # BLD: 0 /100 WBCS — SIGNIFICANT CHANGE UP (ref 0–0)
PHOSPHATE SERPL-MCNC: 2.7 MG/DL — SIGNIFICANT CHANGE UP (ref 2.5–4.5)
PLATELET # BLD AUTO: 297 K/UL — SIGNIFICANT CHANGE UP (ref 150–400)
POTASSIUM SERPL-MCNC: 3.5 MMOL/L — SIGNIFICANT CHANGE UP (ref 3.5–5.3)
POTASSIUM SERPL-SCNC: 3.5 MMOL/L — SIGNIFICANT CHANGE UP (ref 3.5–5.3)
PROT SERPL-MCNC: 6.9 GM/DL — SIGNIFICANT CHANGE UP (ref 6–8.3)
RBC # BLD: 4.46 M/UL — SIGNIFICANT CHANGE UP (ref 4.2–5.8)
RBC # FLD: 14.6 % — HIGH (ref 10.3–14.5)
SODIUM SERPL-SCNC: 144 MMOL/L — SIGNIFICANT CHANGE UP (ref 135–145)
SPECIMEN SOURCE: SIGNIFICANT CHANGE UP
WBC # BLD: 15.08 K/UL — HIGH (ref 3.8–10.5)
WBC # FLD AUTO: 15.08 K/UL — HIGH (ref 3.8–10.5)

## 2024-04-24 PROCEDURE — 99291 CRITICAL CARE FIRST HOUR: CPT

## 2024-04-24 PROCEDURE — 71045 X-RAY EXAM CHEST 1 VIEW: CPT | Mod: 26

## 2024-04-24 RX ORDER — HYDRALAZINE HCL 50 MG
10 TABLET ORAL ONCE
Refills: 0 | Status: COMPLETED | OUTPATIENT
Start: 2024-04-24 | End: 2024-04-24

## 2024-04-24 RX ORDER — FUROSEMIDE 40 MG
40 TABLET ORAL ONCE
Refills: 0 | Status: COMPLETED | OUTPATIENT
Start: 2024-04-24 | End: 2024-04-24

## 2024-04-24 RX ORDER — POTASSIUM CHLORIDE 20 MEQ
40 PACKET (EA) ORAL ONCE
Refills: 0 | Status: COMPLETED | OUTPATIENT
Start: 2024-04-24 | End: 2024-04-24

## 2024-04-24 RX ORDER — DEXMEDETOMIDINE HYDROCHLORIDE IN 0.9% SODIUM CHLORIDE 4 UG/ML
0.2 INJECTION INTRAVENOUS
Qty: 200 | Refills: 0 | Status: DISCONTINUED | OUTPATIENT
Start: 2024-04-24 | End: 2024-04-26

## 2024-04-24 RX ORDER — HYDRALAZINE HCL 50 MG
10 TABLET ORAL ONCE
Refills: 0 | Status: DISCONTINUED | OUTPATIENT
Start: 2024-04-24 | End: 2024-04-24

## 2024-04-24 RX ORDER — NYSTATIN CREAM 100000 [USP'U]/G
1 CREAM TOPICAL
Refills: 0 | Status: DISCONTINUED | OUTPATIENT
Start: 2024-04-24 | End: 2024-05-02

## 2024-04-24 RX ADMIN — Medication 25 MILLIGRAM(S): at 17:16

## 2024-04-24 RX ADMIN — Medication 3 MILLILITER(S): at 05:20

## 2024-04-24 RX ADMIN — Medication 650 MILLIGRAM(S): at 03:54

## 2024-04-24 RX ADMIN — Medication 8 UNIT(S): at 05:01

## 2024-04-24 RX ADMIN — Medication 650 MILLIGRAM(S): at 10:55

## 2024-04-24 RX ADMIN — APIXABAN 5 MILLIGRAM(S): 2.5 TABLET, FILM COATED ORAL at 17:16

## 2024-04-24 RX ADMIN — Medication 4: at 05:01

## 2024-04-24 RX ADMIN — Medication 650 MILLIGRAM(S): at 09:54

## 2024-04-24 RX ADMIN — Medication 8 UNIT(S): at 11:14

## 2024-04-24 RX ADMIN — Medication 3 MILLILITER(S): at 17:24

## 2024-04-24 RX ADMIN — LEVETIRACETAM 1000 MILLIGRAM(S): 250 TABLET, FILM COATED ORAL at 17:17

## 2024-04-24 RX ADMIN — Medication 6: at 11:14

## 2024-04-24 RX ADMIN — Medication 3 MILLILITER(S): at 23:02

## 2024-04-24 RX ADMIN — Medication 8 UNIT(S): at 17:18

## 2024-04-24 RX ADMIN — Medication 40 MILLIGRAM(S): at 10:23

## 2024-04-24 RX ADMIN — Medication 10 MILLIGRAM(S): at 10:24

## 2024-04-24 RX ADMIN — APIXABAN 5 MILLIGRAM(S): 2.5 TABLET, FILM COATED ORAL at 05:00

## 2024-04-24 RX ADMIN — Medication 2: at 17:18

## 2024-04-24 RX ADMIN — Medication 81 MILLIGRAM(S): at 10:23

## 2024-04-24 RX ADMIN — NYSTATIN CREAM 1 APPLICATION(S): 100000 CREAM TOPICAL at 17:16

## 2024-04-24 RX ADMIN — Medication 40 MILLIEQUIVALENT(S): at 05:00

## 2024-04-24 RX ADMIN — Medication 25 MILLIGRAM(S): at 05:00

## 2024-04-24 RX ADMIN — Medication 750 MILLIGRAM(S): at 10:23

## 2024-04-24 RX ADMIN — CHLORHEXIDINE GLUCONATE 1 APPLICATION(S): 213 SOLUTION TOPICAL at 05:09

## 2024-04-24 RX ADMIN — Medication 3 MILLILITER(S): at 11:03

## 2024-04-24 RX ADMIN — Medication 650 MILLIGRAM(S): at 04:30

## 2024-04-24 RX ADMIN — LEVETIRACETAM 1000 MILLIGRAM(S): 250 TABLET, FILM COATED ORAL at 05:00

## 2024-04-24 RX ADMIN — INSULIN GLARGINE 30 UNIT(S): 100 INJECTION, SOLUTION SUBCUTANEOUS at 08:43

## 2024-04-24 NOTE — PROGRESS NOTE ADULT - ASSESSMENT
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 s/p trach/PEG on 12/5. Pt initially felt to have anoxic encephalopathy but MS improved. Eventually pt with removal of trach and PEG. Pt transferred fro [facility] on 4/14 2 to vomiting while on BiPAP; admitted with working dx of aspiration PNA. Today RRT called 2 to hypoxemia which improved, resp distress, lethargy and HTN. In the ICU pt intubated. ICU dx: Acute hypoxic and hypercapneic resp failure requiring intubation possibly 2 to aspiration. s/p extubation 4/23 and later in the evening was placed on NIV    Resp: Cont AVAPs prn  ID: Off Abx  CVS: Cont pt BB/ Give 1 dose of Lasix  HEME: Cont Eliquis  FEN: NPO while on NIV  Endo: Carefully cont Lantus + Lispro and adjust to FS  Renal: Follow BUN/Cr and UO  Neuro: Cont pt's Valproic acid and Keppra/ Son feels pt's tremor are increased today if cont Neuro consult  Social: Pt is full code/ Son at the bedside and updated         Pt is a 71 yo M with h/o COPD, CAD s/p PCI with stent 2015 and CABG 2014, chronic diastolic heart failure (EF 50%), 2nd degree AV block s/p medtronic PPM, HTN, DM, CKD 3, and CVA (lacunar infarct) and prolonged ICU/hospitalization starting 11/2022 2 to cardiac arrest with admitting dx: 1) Hypoglycemia 2) PEA arrest 3) Takotsubo cardiomyopathy found on Echo 4) New onset Sz either 2 to hypoglycemia vs anoxic injury 5) ELMER 2 to ATN 6) Shock liver. s/p trach/PEG on 12/5. Pt initially felt to have anoxic encephalopathy but MS improved. Eventually pt with removal of trach and PEG. Pt transferred fro [facility] on 4/14 2 to vomiting while on BiPAP; admitted with working dx of aspiration PNA. Today RRT called 2 to hypoxemia which improved, resp distress, lethargy and HTN. In the ICU pt intubated. ICU dx: Acute hypoxic and hypercapneic resp failure requiring intubation possibly 2 to aspiration. s/p extubation 4/23 and later in the evening was placed on NIV    Resp: Currently on AVAPs; trial off    ID: Off Abx  CVS: Cont pt BB/ Give 1 dose of Lasix  HEME: Cont Eliquis  FEN: NPO while on NIV  Endo: Carefully cont Lantus + Lispro and adjust to FS  Renal: Follow BUN/Cr and UO  Neuro: Cont pt's Valproic acid and Keppra/ Son feels pt's tremor are increased today if cont Neuro consult  Social: Pt is full code/ Son at the bedside and updated

## 2024-04-25 LAB
ALBUMIN SERPL ELPH-MCNC: 1.8 G/DL — LOW (ref 3.3–5)
ALP SERPL-CCNC: 66 U/L — SIGNIFICANT CHANGE UP (ref 40–120)
ALT FLD-CCNC: 24 U/L — SIGNIFICANT CHANGE UP (ref 12–78)
ANION GAP SERPL CALC-SCNC: 5 MMOL/L — SIGNIFICANT CHANGE UP (ref 5–17)
AST SERPL-CCNC: 29 U/L — SIGNIFICANT CHANGE UP (ref 15–37)
BILIRUB SERPL-MCNC: 0.3 MG/DL — SIGNIFICANT CHANGE UP (ref 0.2–1.2)
BUN SERPL-MCNC: 25 MG/DL — HIGH (ref 7–23)
CALCIUM SERPL-MCNC: 9.4 MG/DL — SIGNIFICANT CHANGE UP (ref 8.5–10.1)
CHLORIDE SERPL-SCNC: 107 MMOL/L — SIGNIFICANT CHANGE UP (ref 96–108)
CO2 SERPL-SCNC: 32 MMOL/L — HIGH (ref 22–31)
CREAT SERPL-MCNC: 0.7 MG/DL — SIGNIFICANT CHANGE UP (ref 0.5–1.3)
EGFR: 96 ML/MIN/1.73M2 — SIGNIFICANT CHANGE UP
GLUCOSE BLDC GLUCOMTR-MCNC: 132 MG/DL — HIGH (ref 70–99)
GLUCOSE BLDC GLUCOMTR-MCNC: 135 MG/DL — HIGH (ref 70–99)
GLUCOSE BLDC GLUCOMTR-MCNC: 136 MG/DL — HIGH (ref 70–99)
GLUCOSE BLDC GLUCOMTR-MCNC: 191 MG/DL — HIGH (ref 70–99)
GLUCOSE BLDC GLUCOMTR-MCNC: 228 MG/DL — HIGH (ref 70–99)
GLUCOSE SERPL-MCNC: 180 MG/DL — HIGH (ref 70–99)
HCT VFR BLD CALC: 39.7 % — SIGNIFICANT CHANGE UP (ref 39–50)
HGB BLD-MCNC: 12.8 G/DL — LOW (ref 13–17)
MAGNESIUM SERPL-MCNC: 2.3 MG/DL — SIGNIFICANT CHANGE UP (ref 1.6–2.6)
MCHC RBC-ENTMCNC: 30.3 PG — SIGNIFICANT CHANGE UP (ref 27–34)
MCHC RBC-ENTMCNC: 32.2 G/DL — SIGNIFICANT CHANGE UP (ref 32–36)
MCV RBC AUTO: 94.1 FL — SIGNIFICANT CHANGE UP (ref 80–100)
NRBC # BLD: 0 /100 WBCS — SIGNIFICANT CHANGE UP (ref 0–0)
PHOSPHATE SERPL-MCNC: 2.4 MG/DL — LOW (ref 2.5–4.5)
PLATELET # BLD AUTO: 339 K/UL — SIGNIFICANT CHANGE UP (ref 150–400)
POTASSIUM SERPL-MCNC: 3.5 MMOL/L — SIGNIFICANT CHANGE UP (ref 3.5–5.3)
POTASSIUM SERPL-SCNC: 3.5 MMOL/L — SIGNIFICANT CHANGE UP (ref 3.5–5.3)
PROT SERPL-MCNC: 6.8 GM/DL — SIGNIFICANT CHANGE UP (ref 6–8.3)
RBC # BLD: 4.22 M/UL — SIGNIFICANT CHANGE UP (ref 4.2–5.8)
RBC # FLD: 14.8 % — HIGH (ref 10.3–14.5)
SODIUM SERPL-SCNC: 144 MMOL/L — SIGNIFICANT CHANGE UP (ref 135–145)
WBC # BLD: 17.27 K/UL — HIGH (ref 3.8–10.5)
WBC # FLD AUTO: 17.27 K/UL — HIGH (ref 3.8–10.5)

## 2024-04-25 PROCEDURE — 99233 SBSQ HOSP IP/OBS HIGH 50: CPT

## 2024-04-25 RX ORDER — SODIUM,POTASSIUM PHOSPHATES 278-250MG
2 POWDER IN PACKET (EA) ORAL ONCE
Refills: 0 | Status: COMPLETED | OUTPATIENT
Start: 2024-04-25 | End: 2024-04-25

## 2024-04-25 RX ORDER — POTASSIUM CHLORIDE 20 MEQ
10 PACKET (EA) ORAL
Refills: 0 | Status: COMPLETED | OUTPATIENT
Start: 2024-04-25 | End: 2024-04-25

## 2024-04-25 RX ADMIN — Medication 3 MILLILITER(S): at 11:25

## 2024-04-25 RX ADMIN — APIXABAN 5 MILLIGRAM(S): 2.5 TABLET, FILM COATED ORAL at 06:25

## 2024-04-25 RX ADMIN — ATORVASTATIN CALCIUM 80 MILLIGRAM(S): 80 TABLET, FILM COATED ORAL at 23:24

## 2024-04-25 RX ADMIN — Medication 100 MILLIEQUIVALENT(S): at 08:52

## 2024-04-25 RX ADMIN — Medication 100 MILLIEQUIVALENT(S): at 06:03

## 2024-04-25 RX ADMIN — Medication 8 UNIT(S): at 06:26

## 2024-04-25 RX ADMIN — DEXMEDETOMIDINE HYDROCHLORIDE IN 0.9% SODIUM CHLORIDE 4.46 MICROGRAM(S)/KG/HR: 4 INJECTION INTRAVENOUS at 05:10

## 2024-04-25 RX ADMIN — SENNA PLUS 2 TABLET(S): 8.6 TABLET ORAL at 23:25

## 2024-04-25 RX ADMIN — Medication 100 MILLIEQUIVALENT(S): at 05:10

## 2024-04-25 RX ADMIN — APIXABAN 5 MILLIGRAM(S): 2.5 TABLET, FILM COATED ORAL at 17:42

## 2024-04-25 RX ADMIN — INSULIN GLARGINE 30 UNIT(S): 100 INJECTION, SOLUTION SUBCUTANEOUS at 08:17

## 2024-04-25 RX ADMIN — Medication 2 PACKET(S): at 06:50

## 2024-04-25 RX ADMIN — Medication 25 MILLIGRAM(S): at 06:25

## 2024-04-25 RX ADMIN — DEXMEDETOMIDINE HYDROCHLORIDE IN 0.9% SODIUM CHLORIDE 4.46 MICROGRAM(S)/KG/HR: 4 INJECTION INTRAVENOUS at 00:05

## 2024-04-25 RX ADMIN — POLYETHYLENE GLYCOL 3350 17 GRAM(S): 17 POWDER, FOR SOLUTION ORAL at 11:56

## 2024-04-25 RX ADMIN — NYSTATIN CREAM 1 APPLICATION(S): 100000 CREAM TOPICAL at 06:27

## 2024-04-25 RX ADMIN — NYSTATIN CREAM 1 APPLICATION(S): 100000 CREAM TOPICAL at 17:51

## 2024-04-25 RX ADMIN — Medication 2: at 00:06

## 2024-04-25 RX ADMIN — LEVETIRACETAM 1000 MILLIGRAM(S): 250 TABLET, FILM COATED ORAL at 17:42

## 2024-04-25 RX ADMIN — Medication 4: at 23:25

## 2024-04-25 RX ADMIN — Medication 10 UNIT(S): at 17:43

## 2024-04-25 RX ADMIN — Medication 3 MILLILITER(S): at 05:22

## 2024-04-25 RX ADMIN — Medication 8 UNIT(S): at 00:05

## 2024-04-25 RX ADMIN — Medication 1 MILLIGRAM(S): at 23:24

## 2024-04-25 RX ADMIN — Medication 3 MILLILITER(S): at 23:25

## 2024-04-25 RX ADMIN — Medication 25 MILLIGRAM(S): at 17:42

## 2024-04-25 RX ADMIN — Medication 3 MILLILITER(S): at 17:41

## 2024-04-25 RX ADMIN — Medication 10 UNIT(S): at 23:25

## 2024-04-25 RX ADMIN — Medication 750 MILLIGRAM(S): at 11:55

## 2024-04-25 RX ADMIN — CHLORHEXIDINE GLUCONATE 1 APPLICATION(S): 213 SOLUTION TOPICAL at 06:25

## 2024-04-25 RX ADMIN — Medication 81 MILLIGRAM(S): at 11:55

## 2024-04-25 RX ADMIN — LEVETIRACETAM 1000 MILLIGRAM(S): 250 TABLET, FILM COATED ORAL at 06:25

## 2024-04-25 NOTE — CHART NOTE - NSCHARTNOTEFT_GEN_A_CORE
Assessment:  Pt seen in CCU, sitting out of bed in a recliner.  Pt c garbled speech, cough noted.  Pt adm c diagnosis of pneumonia, aspiration, acute respiratory failure, s/p intubation, extubation on 04/23.  PMH include TBI, seizures, cardiac arrest, chronic ischemic heart, BPH, COPD, CHF, HTN, HLD, DM( was on Semglee insulin glargine, Admelog sliding scale coverage & Jardiance as per transfer records from [facility])  Diet PTA: consistent carbohydrate, DASH/ TLC, regular consistency, thin fluids.  Pt does not eat beef or pork.    Factors impacting intake: [ ] none [ ] nausea  [ ] vomiting [ ] diarrhea [ ] constipation  [ ]chewing problems [x ] swallowing issues  [x ] other: swallowing evaluations ordered    Diet Prescription: Diet, NPO with Tube Feed:   Tube Feeding Modality: Nasogastric  Glucerna 1.2 Pedrito  Total Volume for 24 Hours (mL): 1163.1  Intermittent  Starting Tube Feed Rate {mL per Hour}: 20  Increase Tube Feed Rate by (mL): 10    Every 4 hours  Until Goal Tube Feed Rate (mL per Hour): 70  Tube Feeding Hours ON: 18  Tube Feeding OFF (Hours): 8  Tube Feed Start Time: 17:00 (04-17-24 @ 09:44)    Intake: Glucerna 1.2 @ goal of 70 ml/hr x 18 hours=1260 ml, 1512 calories, 76 grams protein, 1014 ml free water daily.      Current Weight: 04/25, 89.1 kg, 04/16, 89.2 kg(drug dose wt.), 04/15, 118.5 kg, 04/14, 126.5 kg, wt. variations, c wt. loss 37.4 kg noted, edema noted, however ? wt. accuracy   % Weight Change: 30%(? accuracy of wt.)   04/25, 2+ (mild) edema of feet, 3+(moderate)edema of arms, hands noted    Pertinent Medications: MEDICATIONS  (STANDING):  albuterol/ipratropium for Nebulization 3 milliLiter(s) Nebulizer every 6 hours  apixaban 5 milliGRAM(s) Oral every 12 hours  aspirin  chewable 81 milliGRAM(s) Oral daily  atorvastatin 80 milliGRAM(s) Oral at bedtime  chlorhexidine 2% Cloths 1 Application(s) Topical <User Schedule>  dexMEDEtomidine Infusion 0.2 MICROgram(s)/kG/Hr (4.46 mL/Hr) IV Continuous <Continuous>  doxazosin 1 milliGRAM(s) Oral at bedtime  insulin glargine Injectable (LANTUS) 30 Unit(s) SubCutaneous every morning  insulin lispro (ADMELOG) corrective regimen sliding scale   SubCutaneous every 6 hours  insulin lispro Injectable (ADMELOG) 10 Unit(s) SubCutaneous every 6 hours  levETIRAcetam 1000 milliGRAM(s) Oral two times a day  metoprolol tartrate 25 milliGRAM(s) Oral two times a day  nystatin Powder 1 Application(s) Topical two times a day  polyethylene glycol 3350 17 Gram(s) Oral daily  senna 2 Tablet(s) Oral at bedtime  valproic  acid Syrup 750 milliGRAM(s) Oral daily    MEDICATIONS  (PRN):  acetaminophen     Tablet .. 650 milliGRAM(s) Oral every 6 hours PRN Temp greater or equal to 38C (100.4F), Mild Pain (1 - 3)  albuterol    90 MICROgram(s) HFA Inhaler 2 Puff(s) Inhalation every 6 hours PRN Shortness of Breath and/or Wheezing  ondansetron Injectable 4 milliGRAM(s) IV Push every 8 hours PRN Nausea and/or Vomiting    Pertinent Labs: 04-25 Na144 mmol/L Glu 180 mg/dL<H> K+ 3.5 mmol/L Cr  0.70 mg/dL BUN 25 mg/dL<H> 04-25 Phos 2.4 mg/dL<L> 04-25 Alb 1.8 g/dL<L>04-25 ALT 24 U/L AST 29 U/L Alkaline Phosphatase 66 U/L  04-15-24 @ 05:25 a1c 9.2<H>   CAPILLARY BLOOD GLUCOSE      POCT Blood Glucose.: 135 mg/dL (25 Apr 2024 11:53)  POCT Blood Glucose.: 136 mg/dL (25 Apr 2024 06:21)  POCT Blood Glucose.: 191 mg/dL (25 Apr 2024 00:04)  POCT Blood Glucose.: 177 mg/dL (24 Apr 2024 17:12)    Skin: no pressure ulcer noted    Estimated Needs:   [x ] no change since previous assessment(04/18)  [ ] recalculated:     Previous Nutrition Diagnosis: (04/18)  Altered Nutrition Related Lab Values   Etiology: endocrine dysfunction   Signs/Symptoms: A1C=9.2%   Goal/Expected Outcome: ICU blood glucose goal 140-180 mg/dL; closely met   Nutrition Diagnosis is [ x] ongoing  [ ] resolved [ ] not applicable       New Nutrition Diagnosis: [x ] not applicable     Interventions:   Recommend  [ ] Change Diet To:  [ ] Nutrition Supplement  [x ] Nutrition Support; continue c current enteral feeding @ present  [x] if oral feeding is ordered, include low sodium, consistent carbohydrate c evening snack c consistency of diet/fluids as per swallow evaluation, No beef, No pork (Food & Nutrition Service made aware)  [x ] Other: Endocrine consult as indicated       Monitoring and Evaluation:   [ ] PO intake [ x ] Tolerance to diet prescription [ x ] weights [ x ] labs[ x ] follow up per protocol  [ ] other:

## 2024-04-25 NOTE — PROVIDER CONTACT NOTE (OTHER) - SITUATION
pt states he is scared and anxious, is agreeable to medication to help him calm down. pt hypertensive and agitated while on AVAPS

## 2024-04-25 NOTE — PROGRESS NOTE ADULT - ASSESSMENT
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 s/p trach/PEG on 12/5. Pt initially felt to have anoxic encephalopathy but MS improved. Eventually pt with removal of trach and PEG. Pt transferred fro [facility] on 4/14 2 to vomiting while on BiPAP; admitted with working dx of aspiration PNA. Today RRT called 2 to hypoxemia which improved, resp distress, lethargy and HTN. In the ICU pt intubated. ICU dx: Acute hypoxic and hypercapneic resp failure requiring intubation possibly 2 to aspiration. s/p extubation 4/23 and later in the evening was placed on NIV. 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 s/p trach/PEG on 12/5. Pt initially felt to have anoxic encephalopathy but MS improved. Eventually pt with removal of trach and PEG. Pt transferred fro [facility] on 4/14 2 to vomiting while on BiPAP; admitted with working dx of aspiration PNA. Today RRT called 2 to hypoxemia which improved, resp distress, lethargy and HTN. In the ICU pt intubated. ICU dx: Acute hypoxic and hypercapneic resp failure requiring intubation possibly 2 to aspiration. s/p extubation 4/23 and later in the evening was placed on NIV. Currently off NIV    Resp: AVAPs prn  ID: Off Abx  CVS: Cont pt BB/  Lasix prn  HEME: Cont Eliquis  FEN: NPO while on NIV/ Speech/Swallow evaluation  Endo: Carefully cont Lantus + Lispro and adjust to FS  Renal: Follow BUN/Cr and UO  Neuro: Cont pt's Valproic acid and Keppra/ Son feels pt's tremor are less today  Social: Pt is full code/ Son at the bedside and updated/ OOB->chair/ PT/OT

## 2024-04-26 LAB
ALBUMIN SERPL ELPH-MCNC: 2 G/DL — LOW (ref 3.3–5)
ALP SERPL-CCNC: 85 U/L — SIGNIFICANT CHANGE UP (ref 40–120)
ALT FLD-CCNC: 35 U/L — SIGNIFICANT CHANGE UP (ref 12–78)
ANION GAP SERPL CALC-SCNC: 2 MMOL/L — LOW (ref 5–17)
AST SERPL-CCNC: 37 U/L — SIGNIFICANT CHANGE UP (ref 15–37)
BASOPHILS # BLD AUTO: 0.07 K/UL — SIGNIFICANT CHANGE UP (ref 0–0.2)
BASOPHILS NFR BLD AUTO: 0.6 % — SIGNIFICANT CHANGE UP (ref 0–2)
BILIRUB SERPL-MCNC: 0.3 MG/DL — SIGNIFICANT CHANGE UP (ref 0.2–1.2)
BUN SERPL-MCNC: 25 MG/DL — HIGH (ref 7–23)
CALCIUM SERPL-MCNC: 9.3 MG/DL — SIGNIFICANT CHANGE UP (ref 8.5–10.1)
CHLORIDE SERPL-SCNC: 105 MMOL/L — SIGNIFICANT CHANGE UP (ref 96–108)
CO2 SERPL-SCNC: 36 MMOL/L — HIGH (ref 22–31)
CREAT SERPL-MCNC: 0.81 MG/DL — SIGNIFICANT CHANGE UP (ref 0.5–1.3)
CULTURE RESULTS: SIGNIFICANT CHANGE UP
CULTURE RESULTS: SIGNIFICANT CHANGE UP
EGFR: 92 ML/MIN/1.73M2 — SIGNIFICANT CHANGE UP
EOSINOPHIL # BLD AUTO: 0.51 K/UL — HIGH (ref 0–0.5)
EOSINOPHIL NFR BLD AUTO: 4.1 % — SIGNIFICANT CHANGE UP (ref 0–6)
GLUCOSE BLDC GLUCOMTR-MCNC: 136 MG/DL — HIGH (ref 70–99)
GLUCOSE BLDC GLUCOMTR-MCNC: 169 MG/DL — HIGH (ref 70–99)
GLUCOSE BLDC GLUCOMTR-MCNC: 191 MG/DL — HIGH (ref 70–99)
GLUCOSE BLDC GLUCOMTR-MCNC: 88 MG/DL — SIGNIFICANT CHANGE UP (ref 70–99)
GLUCOSE SERPL-MCNC: 174 MG/DL — HIGH (ref 70–99)
HCT VFR BLD CALC: 41.2 % — SIGNIFICANT CHANGE UP (ref 39–50)
HGB BLD-MCNC: 12.6 G/DL — LOW (ref 13–17)
IMM GRANULOCYTES NFR BLD AUTO: 0.2 % — SIGNIFICANT CHANGE UP (ref 0–0.9)
LYMPHOCYTES # BLD AUTO: 1.62 K/UL — SIGNIFICANT CHANGE UP (ref 1–3.3)
LYMPHOCYTES # BLD AUTO: 13 % — SIGNIFICANT CHANGE UP (ref 13–44)
MAGNESIUM SERPL-MCNC: 2.6 MG/DL — SIGNIFICANT CHANGE UP (ref 1.6–2.6)
MCHC RBC-ENTMCNC: 29.8 PG — SIGNIFICANT CHANGE UP (ref 27–34)
MCHC RBC-ENTMCNC: 30.6 G/DL — LOW (ref 32–36)
MCV RBC AUTO: 97.4 FL — SIGNIFICANT CHANGE UP (ref 80–100)
MONOCYTES # BLD AUTO: 0.96 K/UL — HIGH (ref 0–0.9)
MONOCYTES NFR BLD AUTO: 7.7 % — SIGNIFICANT CHANGE UP (ref 2–14)
NEUTROPHILS # BLD AUTO: 9.24 K/UL — HIGH (ref 1.8–7.4)
NEUTROPHILS NFR BLD AUTO: 74.4 % — SIGNIFICANT CHANGE UP (ref 43–77)
NRBC # BLD: 0 /100 WBCS — SIGNIFICANT CHANGE UP (ref 0–0)
PHOSPHATE SERPL-MCNC: 2.6 MG/DL — SIGNIFICANT CHANGE UP (ref 2.5–4.5)
PLATELET # BLD AUTO: 348 K/UL — SIGNIFICANT CHANGE UP (ref 150–400)
POTASSIUM SERPL-MCNC: 4.1 MMOL/L — SIGNIFICANT CHANGE UP (ref 3.5–5.3)
POTASSIUM SERPL-SCNC: 4.1 MMOL/L — SIGNIFICANT CHANGE UP (ref 3.5–5.3)
PROT SERPL-MCNC: 7.2 GM/DL — SIGNIFICANT CHANGE UP (ref 6–8.3)
RBC # BLD: 4.23 M/UL — SIGNIFICANT CHANGE UP (ref 4.2–5.8)
RBC # FLD: 14.6 % — HIGH (ref 10.3–14.5)
SODIUM SERPL-SCNC: 143 MMOL/L — SIGNIFICANT CHANGE UP (ref 135–145)
SPECIMEN SOURCE: SIGNIFICANT CHANGE UP
SPECIMEN SOURCE: SIGNIFICANT CHANGE UP
WBC # BLD: 12.43 K/UL — HIGH (ref 3.8–10.5)
WBC # FLD AUTO: 12.43 K/UL — HIGH (ref 3.8–10.5)

## 2024-04-26 PROCEDURE — 71045 X-RAY EXAM CHEST 1 VIEW: CPT | Mod: 26

## 2024-04-26 PROCEDURE — 99233 SBSQ HOSP IP/OBS HIGH 50: CPT

## 2024-04-26 RX ADMIN — Medication 2: at 05:20

## 2024-04-26 RX ADMIN — Medication 3 MILLILITER(S): at 12:07

## 2024-04-26 RX ADMIN — Medication 3 MILLILITER(S): at 05:18

## 2024-04-26 RX ADMIN — NYSTATIN CREAM 1 APPLICATION(S): 100000 CREAM TOPICAL at 17:00

## 2024-04-26 RX ADMIN — Medication 25 MILLIGRAM(S): at 05:20

## 2024-04-26 RX ADMIN — Medication 10 UNIT(S): at 05:20

## 2024-04-26 RX ADMIN — Medication 750 MILLIGRAM(S): at 12:17

## 2024-04-26 RX ADMIN — ATORVASTATIN CALCIUM 80 MILLIGRAM(S): 80 TABLET, FILM COATED ORAL at 21:23

## 2024-04-26 RX ADMIN — Medication 1 MILLIGRAM(S): at 21:23

## 2024-04-26 RX ADMIN — CHLORHEXIDINE GLUCONATE 1 APPLICATION(S): 213 SOLUTION TOPICAL at 01:05

## 2024-04-26 RX ADMIN — Medication 10 UNIT(S): at 11:13

## 2024-04-26 RX ADMIN — APIXABAN 5 MILLIGRAM(S): 2.5 TABLET, FILM COATED ORAL at 05:20

## 2024-04-26 RX ADMIN — NYSTATIN CREAM 1 APPLICATION(S): 100000 CREAM TOPICAL at 01:05

## 2024-04-26 RX ADMIN — Medication 2: at 23:31

## 2024-04-26 RX ADMIN — POLYETHYLENE GLYCOL 3350 17 GRAM(S): 17 POWDER, FOR SOLUTION ORAL at 11:16

## 2024-04-26 RX ADMIN — Medication 25 MILLIGRAM(S): at 17:01

## 2024-04-26 RX ADMIN — LEVETIRACETAM 1000 MILLIGRAM(S): 250 TABLET, FILM COATED ORAL at 17:00

## 2024-04-26 RX ADMIN — Medication 81 MILLIGRAM(S): at 11:16

## 2024-04-26 RX ADMIN — SENNA PLUS 2 TABLET(S): 8.6 TABLET ORAL at 21:24

## 2024-04-26 RX ADMIN — APIXABAN 5 MILLIGRAM(S): 2.5 TABLET, FILM COATED ORAL at 17:00

## 2024-04-26 RX ADMIN — LEVETIRACETAM 1000 MILLIGRAM(S): 250 TABLET, FILM COATED ORAL at 05:20

## 2024-04-26 RX ADMIN — Medication 3 MILLILITER(S): at 17:36

## 2024-04-26 RX ADMIN — Medication 650 MILLIGRAM(S): at 06:00

## 2024-04-26 RX ADMIN — Medication 3 MILLILITER(S): at 23:46

## 2024-04-26 RX ADMIN — INSULIN GLARGINE 30 UNIT(S): 100 INJECTION, SOLUTION SUBCUTANEOUS at 08:07

## 2024-04-26 RX ADMIN — Medication 650 MILLIGRAM(S): at 05:20

## 2024-04-26 NOTE — PROGRESS NOTE ADULT - ASSESSMENT
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 s/p trach/PEG on 12/5. Pt initially felt to have anoxic encephalopathy but MS improved. Eventually pt with removal of trach and PEG. Pt transferred fro [facility] on 4/14 2 to vomiting while on BiPAP; admitted with working dx of aspiration PNA. Today RRT called 2 to hypoxemia which improved, resp distress, lethargy and HTN. In the ICU pt intubated. ICU dx: Acute hypoxic and hypercapneic resp failure requiring intubation possibly 2 to aspiration. s/p extubation 4/23 and later in the evening was placed on NIV. Currently off NIV    Resp: AVAPs prn  ID: Off Abx  CVS: Cont pt BB/  Lasix prn  HEME: Cont Eliquis  FEN: Speech/Swallow evaluation  Endo: Carefully cont Lantus + Lispro and adjust to FS  Renal: Follow BUN/Cr and UO  Neuro: Cont pt's Valproic acid and Keppra  Social: Pt is full code/ Son at the bedside and updated/ OOB->chair/ PT/OT

## 2024-04-27 LAB
ALBUMIN SERPL ELPH-MCNC: 1.8 G/DL — LOW (ref 3.3–5)
ALP SERPL-CCNC: 74 U/L — SIGNIFICANT CHANGE UP (ref 40–120)
ALT FLD-CCNC: 28 U/L — SIGNIFICANT CHANGE UP (ref 12–78)
ANION GAP SERPL CALC-SCNC: 3 MMOL/L — LOW (ref 5–17)
AST SERPL-CCNC: 28 U/L — SIGNIFICANT CHANGE UP (ref 15–37)
BASOPHILS # BLD AUTO: 0.08 K/UL — SIGNIFICANT CHANGE UP (ref 0–0.2)
BASOPHILS NFR BLD AUTO: 0.8 % — SIGNIFICANT CHANGE UP (ref 0–2)
BILIRUB SERPL-MCNC: 0.3 MG/DL — SIGNIFICANT CHANGE UP (ref 0.2–1.2)
BUN SERPL-MCNC: 21 MG/DL — SIGNIFICANT CHANGE UP (ref 7–23)
CALCIUM SERPL-MCNC: 9.1 MG/DL — SIGNIFICANT CHANGE UP (ref 8.5–10.1)
CHLORIDE SERPL-SCNC: 105 MMOL/L — SIGNIFICANT CHANGE UP (ref 96–108)
CO2 SERPL-SCNC: 36 MMOL/L — HIGH (ref 22–31)
CREAT SERPL-MCNC: 0.68 MG/DL — SIGNIFICANT CHANGE UP (ref 0.5–1.3)
EGFR: 97 ML/MIN/1.73M2 — SIGNIFICANT CHANGE UP
EOSINOPHIL # BLD AUTO: 0.73 K/UL — HIGH (ref 0–0.5)
EOSINOPHIL NFR BLD AUTO: 7 % — HIGH (ref 0–6)
GLUCOSE BLDC GLUCOMTR-MCNC: 141 MG/DL — HIGH (ref 70–99)
GLUCOSE BLDC GLUCOMTR-MCNC: 144 MG/DL — HIGH (ref 70–99)
GLUCOSE BLDC GLUCOMTR-MCNC: 154 MG/DL — HIGH (ref 70–99)
GLUCOSE BLDC GLUCOMTR-MCNC: 226 MG/DL — HIGH (ref 70–99)
GLUCOSE SERPL-MCNC: 210 MG/DL — HIGH (ref 70–99)
HCT VFR BLD CALC: 35.9 % — LOW (ref 39–50)
HGB BLD-MCNC: 11.6 G/DL — LOW (ref 13–17)
IMM GRANULOCYTES NFR BLD AUTO: 0.3 % — SIGNIFICANT CHANGE UP (ref 0–0.9)
LYMPHOCYTES # BLD AUTO: 1.77 K/UL — SIGNIFICANT CHANGE UP (ref 1–3.3)
LYMPHOCYTES # BLD AUTO: 16.9 % — SIGNIFICANT CHANGE UP (ref 13–44)
MAGNESIUM SERPL-MCNC: 2.3 MG/DL — SIGNIFICANT CHANGE UP (ref 1.6–2.6)
MCHC RBC-ENTMCNC: 31.3 PG — SIGNIFICANT CHANGE UP (ref 27–34)
MCHC RBC-ENTMCNC: 32.3 G/DL — SIGNIFICANT CHANGE UP (ref 32–36)
MCV RBC AUTO: 96.8 FL — SIGNIFICANT CHANGE UP (ref 80–100)
MONOCYTES # BLD AUTO: 0.9 K/UL — SIGNIFICANT CHANGE UP (ref 0–0.9)
MONOCYTES NFR BLD AUTO: 8.6 % — SIGNIFICANT CHANGE UP (ref 2–14)
NEUTROPHILS # BLD AUTO: 6.96 K/UL — SIGNIFICANT CHANGE UP (ref 1.8–7.4)
NEUTROPHILS NFR BLD AUTO: 66.4 % — SIGNIFICANT CHANGE UP (ref 43–77)
NRBC # BLD: 0 /100 WBCS — SIGNIFICANT CHANGE UP (ref 0–0)
PHOSPHATE SERPL-MCNC: 2.7 MG/DL — SIGNIFICANT CHANGE UP (ref 2.5–4.5)
PLATELET # BLD AUTO: 337 K/UL — SIGNIFICANT CHANGE UP (ref 150–400)
POTASSIUM SERPL-MCNC: 4 MMOL/L — SIGNIFICANT CHANGE UP (ref 3.5–5.3)
POTASSIUM SERPL-SCNC: 4 MMOL/L — SIGNIFICANT CHANGE UP (ref 3.5–5.3)
PROT SERPL-MCNC: 6.4 GM/DL — SIGNIFICANT CHANGE UP (ref 6–8.3)
RBC # BLD: 3.71 M/UL — LOW (ref 4.2–5.8)
RBC # FLD: 14.5 % — SIGNIFICANT CHANGE UP (ref 10.3–14.5)
SODIUM SERPL-SCNC: 144 MMOL/L — SIGNIFICANT CHANGE UP (ref 135–145)
WBC # BLD: 10.47 K/UL — SIGNIFICANT CHANGE UP (ref 3.8–10.5)
WBC # FLD AUTO: 10.47 K/UL — SIGNIFICANT CHANGE UP (ref 3.8–10.5)

## 2024-04-27 PROCEDURE — 99291 CRITICAL CARE FIRST HOUR: CPT

## 2024-04-27 RX ORDER — INSULIN LISPRO 100/ML
VIAL (ML) SUBCUTANEOUS
Refills: 0 | Status: DISCONTINUED | OUTPATIENT
Start: 2024-04-27 | End: 2024-05-02

## 2024-04-27 RX ADMIN — CHLORHEXIDINE GLUCONATE 1 APPLICATION(S): 213 SOLUTION TOPICAL at 06:03

## 2024-04-27 RX ADMIN — APIXABAN 5 MILLIGRAM(S): 2.5 TABLET, FILM COATED ORAL at 17:33

## 2024-04-27 RX ADMIN — Medication 2: at 23:04

## 2024-04-27 RX ADMIN — Medication 1 MILLIGRAM(S): at 23:03

## 2024-04-27 RX ADMIN — Medication 25 MILLIGRAM(S): at 17:32

## 2024-04-27 RX ADMIN — INSULIN GLARGINE 30 UNIT(S): 100 INJECTION, SOLUTION SUBCUTANEOUS at 09:21

## 2024-04-27 RX ADMIN — Medication 81 MILLIGRAM(S): at 11:31

## 2024-04-27 RX ADMIN — Medication 4: at 06:28

## 2024-04-27 RX ADMIN — NYSTATIN CREAM 1 APPLICATION(S): 100000 CREAM TOPICAL at 05:38

## 2024-04-27 RX ADMIN — Medication 3 MILLILITER(S): at 17:10

## 2024-04-27 RX ADMIN — NYSTATIN CREAM 1 APPLICATION(S): 100000 CREAM TOPICAL at 11:31

## 2024-04-27 RX ADMIN — SENNA PLUS 2 TABLET(S): 8.6 TABLET ORAL at 23:02

## 2024-04-27 RX ADMIN — ATORVASTATIN CALCIUM 80 MILLIGRAM(S): 80 TABLET, FILM COATED ORAL at 23:04

## 2024-04-27 RX ADMIN — Medication 3 MILLILITER(S): at 23:20

## 2024-04-27 RX ADMIN — Medication 3 MILLILITER(S): at 11:53

## 2024-04-27 RX ADMIN — Medication 3 MILLILITER(S): at 05:11

## 2024-04-27 RX ADMIN — Medication 750 MILLIGRAM(S): at 11:31

## 2024-04-27 RX ADMIN — LEVETIRACETAM 1000 MILLIGRAM(S): 250 TABLET, FILM COATED ORAL at 18:06

## 2024-04-27 NOTE — PROGRESS NOTE ADULT - ASSESSMENT
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 s/p trach/PEG on 12/5. Pt initially felt to have anoxic encephalopathy but MS improved. Eventually pt with removal of trach and PEG. Pt transferred [facility] on 4/14 2 to vomiting while on BiPAP; admitted with working dx of aspiration PNA. Today RRT called 2 to hypoxemia which improved, resp distress, lethargy and HTN. In the ICU pt intubated. ICU dx: Acute hypoxic and hypercapneic resp failure requiring intubation possibly 2 to aspiration. s/p extubation 4/23 and later in the evening was placed on NIV. Currently off NIV    Resp: AVAPs prn  ID: Off Abx  CVS: Cont pt BB/  Lasix prn  HEME: Cont Eliquis  FEN: NPO while on NIV/ Speech/Swallow evaluation pending today. ok with ice chips for now.  Endo: Carefully cont Lantus + Lispro and adjust to FS  Renal: Follow BUN/Cr and UO  Neuro: Cont pt's Valproic acid and Keppra/ Son feels pt's tremor are less today  Social: Pt is full code/ Son at the bedside and updated/ OOB->chair/ PT/OT  Dispo: Downgrade after speech and swallow eval.

## 2024-04-28 LAB
ALBUMIN SERPL ELPH-MCNC: 1.9 G/DL — LOW (ref 3.3–5)
ALP SERPL-CCNC: 67 U/L — SIGNIFICANT CHANGE UP (ref 40–120)
ALT FLD-CCNC: 24 U/L — SIGNIFICANT CHANGE UP (ref 12–78)
ANION GAP SERPL CALC-SCNC: 3 MMOL/L — LOW (ref 5–17)
AST SERPL-CCNC: 22 U/L — SIGNIFICANT CHANGE UP (ref 15–37)
BASOPHILS # BLD AUTO: 0.08 K/UL — SIGNIFICANT CHANGE UP (ref 0–0.2)
BASOPHILS NFR BLD AUTO: 0.9 % — SIGNIFICANT CHANGE UP (ref 0–2)
BILIRUB SERPL-MCNC: 0.3 MG/DL — SIGNIFICANT CHANGE UP (ref 0.2–1.2)
BUN SERPL-MCNC: 16 MG/DL — SIGNIFICANT CHANGE UP (ref 7–23)
CALCIUM SERPL-MCNC: 9.3 MG/DL — SIGNIFICANT CHANGE UP (ref 8.5–10.1)
CHLORIDE SERPL-SCNC: 107 MMOL/L — SIGNIFICANT CHANGE UP (ref 96–108)
CO2 SERPL-SCNC: 35 MMOL/L — HIGH (ref 22–31)
CREAT SERPL-MCNC: 0.65 MG/DL — SIGNIFICANT CHANGE UP (ref 0.5–1.3)
EGFR: 98 ML/MIN/1.73M2 — SIGNIFICANT CHANGE UP
EOSINOPHIL # BLD AUTO: 0.85 K/UL — HIGH (ref 0–0.5)
EOSINOPHIL NFR BLD AUTO: 9.1 % — HIGH (ref 0–6)
GLUCOSE BLDC GLUCOMTR-MCNC: 114 MG/DL — HIGH (ref 70–99)
GLUCOSE BLDC GLUCOMTR-MCNC: 185 MG/DL — HIGH (ref 70–99)
GLUCOSE BLDC GLUCOMTR-MCNC: 222 MG/DL — HIGH (ref 70–99)
GLUCOSE BLDC GLUCOMTR-MCNC: 249 MG/DL — HIGH (ref 70–99)
GLUCOSE SERPL-MCNC: 123 MG/DL — HIGH (ref 70–99)
HCT VFR BLD CALC: 38.8 % — LOW (ref 39–50)
HGB BLD-MCNC: 12.5 G/DL — LOW (ref 13–17)
IMM GRANULOCYTES NFR BLD AUTO: 0.3 % — SIGNIFICANT CHANGE UP (ref 0–0.9)
LYMPHOCYTES # BLD AUTO: 2.17 K/UL — SIGNIFICANT CHANGE UP (ref 1–3.3)
LYMPHOCYTES # BLD AUTO: 23.2 % — SIGNIFICANT CHANGE UP (ref 13–44)
MAGNESIUM SERPL-MCNC: 2.2 MG/DL — SIGNIFICANT CHANGE UP (ref 1.6–2.6)
MCHC RBC-ENTMCNC: 31.2 PG — SIGNIFICANT CHANGE UP (ref 27–34)
MCHC RBC-ENTMCNC: 32.2 G/DL — SIGNIFICANT CHANGE UP (ref 32–36)
MCV RBC AUTO: 96.8 FL — SIGNIFICANT CHANGE UP (ref 80–100)
MONOCYTES # BLD AUTO: 0.78 K/UL — SIGNIFICANT CHANGE UP (ref 0–0.9)
MONOCYTES NFR BLD AUTO: 8.3 % — SIGNIFICANT CHANGE UP (ref 2–14)
NEUTROPHILS # BLD AUTO: 5.44 K/UL — SIGNIFICANT CHANGE UP (ref 1.8–7.4)
NEUTROPHILS NFR BLD AUTO: 58.2 % — SIGNIFICANT CHANGE UP (ref 43–77)
NRBC # BLD: 0 /100 WBCS — SIGNIFICANT CHANGE UP (ref 0–0)
PHOSPHATE SERPL-MCNC: 2.6 MG/DL — SIGNIFICANT CHANGE UP (ref 2.5–4.5)
PLATELET # BLD AUTO: 362 K/UL — SIGNIFICANT CHANGE UP (ref 150–400)
POTASSIUM SERPL-MCNC: 3.8 MMOL/L — SIGNIFICANT CHANGE UP (ref 3.5–5.3)
POTASSIUM SERPL-SCNC: 3.8 MMOL/L — SIGNIFICANT CHANGE UP (ref 3.5–5.3)
PROT SERPL-MCNC: 6.6 GM/DL — SIGNIFICANT CHANGE UP (ref 6–8.3)
RBC # BLD: 4.01 M/UL — LOW (ref 4.2–5.8)
RBC # FLD: 14.2 % — SIGNIFICANT CHANGE UP (ref 10.3–14.5)
SODIUM SERPL-SCNC: 145 MMOL/L — SIGNIFICANT CHANGE UP (ref 135–145)
WBC # BLD: 9.35 K/UL — SIGNIFICANT CHANGE UP (ref 3.8–10.5)
WBC # FLD AUTO: 9.35 K/UL — SIGNIFICANT CHANGE UP (ref 3.8–10.5)

## 2024-04-28 PROCEDURE — 99232 SBSQ HOSP IP/OBS MODERATE 35: CPT

## 2024-04-28 RX ORDER — POTASSIUM CHLORIDE 20 MEQ
10 PACKET (EA) ORAL
Refills: 0 | Status: COMPLETED | OUTPATIENT
Start: 2024-04-28 | End: 2024-04-28

## 2024-04-28 RX ADMIN — Medication 25 MILLIGRAM(S): at 05:40

## 2024-04-28 RX ADMIN — NYSTATIN CREAM 1 APPLICATION(S): 100000 CREAM TOPICAL at 06:46

## 2024-04-28 RX ADMIN — POLYETHYLENE GLYCOL 3350 17 GRAM(S): 17 POWDER, FOR SOLUTION ORAL at 11:22

## 2024-04-28 RX ADMIN — LEVETIRACETAM 1000 MILLIGRAM(S): 250 TABLET, FILM COATED ORAL at 05:40

## 2024-04-28 RX ADMIN — Medication 750 MILLIGRAM(S): at 11:22

## 2024-04-28 RX ADMIN — INSULIN GLARGINE 30 UNIT(S): 100 INJECTION, SOLUTION SUBCUTANEOUS at 08:13

## 2024-04-28 RX ADMIN — Medication 25 MILLIGRAM(S): at 17:45

## 2024-04-28 RX ADMIN — CHLORHEXIDINE GLUCONATE 1 APPLICATION(S): 213 SOLUTION TOPICAL at 06:46

## 2024-04-28 RX ADMIN — LEVETIRACETAM 1000 MILLIGRAM(S): 250 TABLET, FILM COATED ORAL at 17:45

## 2024-04-28 RX ADMIN — ATORVASTATIN CALCIUM 80 MILLIGRAM(S): 80 TABLET, FILM COATED ORAL at 21:06

## 2024-04-28 RX ADMIN — APIXABAN 5 MILLIGRAM(S): 2.5 TABLET, FILM COATED ORAL at 17:45

## 2024-04-28 RX ADMIN — Medication 3 MILLILITER(S): at 11:27

## 2024-04-28 RX ADMIN — Medication 3 MILLILITER(S): at 17:02

## 2024-04-28 RX ADMIN — Medication 2: at 16:35

## 2024-04-28 RX ADMIN — Medication 3 MILLILITER(S): at 05:13

## 2024-04-28 RX ADMIN — Medication 100 MILLIEQUIVALENT(S): at 04:37

## 2024-04-28 RX ADMIN — Medication 4: at 11:22

## 2024-04-28 RX ADMIN — Medication 1 MILLIGRAM(S): at 21:06

## 2024-04-28 RX ADMIN — Medication 81 MILLIGRAM(S): at 11:22

## 2024-04-28 RX ADMIN — APIXABAN 5 MILLIGRAM(S): 2.5 TABLET, FILM COATED ORAL at 05:39

## 2024-04-28 RX ADMIN — NYSTATIN CREAM 1 APPLICATION(S): 100000 CREAM TOPICAL at 17:45

## 2024-04-28 RX ADMIN — Medication 4: at 21:14

## 2024-04-28 RX ADMIN — Medication 100 MILLIEQUIVALENT(S): at 05:39

## 2024-04-28 NOTE — CHART NOTE - NSCHARTNOTEFT_GEN_A_CORE
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 Pt transferred from [facility] on 4/14/24 due to vomiting while on BiPAP; admitted with working dx of aspiration PNA. Patient admitted to [facility] and RRT called 2 to hypoxemia which improved, resp distress, lethargy and HTN. In the ICU pt intubated for acute hypoxic respiratory failure 2/2 to aspiration event. Patient extubated 4/23 to bipap. Now only requiring AVAPs at night. He was treated with zosyn empirically for aspiration PNA.   Patient seen & examined at bedside today. Patient is HD stable, awake & alert, maintaining Spo2 on NC throughout day, tolerating PO diet. Patient does not require ICU level of care at this time.           Plan:   - 4L NC during the day, wean as tolerated.  AVAPS at night.   - Passed swallow evaluation, tolerating pureed diet   - Continue home eliquis for AF   - s/p course of zosyn.   - Continue Lantus/ISS.     Discussed with ICU attending Mo. Discussed with hospitalist.

## 2024-04-29 LAB
ALBUMIN SERPL ELPH-MCNC: 1.7 G/DL — LOW (ref 3.3–5)
ALP SERPL-CCNC: 59 U/L — SIGNIFICANT CHANGE UP (ref 40–120)
ALT FLD-CCNC: 23 U/L — SIGNIFICANT CHANGE UP (ref 12–78)
ANION GAP SERPL CALC-SCNC: 5 MMOL/L — SIGNIFICANT CHANGE UP (ref 5–17)
AST SERPL-CCNC: 35 U/L — SIGNIFICANT CHANGE UP (ref 15–37)
BILIRUB SERPL-MCNC: 0.3 MG/DL — SIGNIFICANT CHANGE UP (ref 0.2–1.2)
BUN SERPL-MCNC: 15 MG/DL — SIGNIFICANT CHANGE UP (ref 7–23)
CALCIUM SERPL-MCNC: 8.5 MG/DL — SIGNIFICANT CHANGE UP (ref 8.5–10.1)
CHLORIDE SERPL-SCNC: 106 MMOL/L — SIGNIFICANT CHANGE UP (ref 96–108)
CO2 SERPL-SCNC: 31 MMOL/L — SIGNIFICANT CHANGE UP (ref 22–31)
CREAT SERPL-MCNC: 0.6 MG/DL — SIGNIFICANT CHANGE UP (ref 0.5–1.3)
EGFR: 101 ML/MIN/1.73M2 — SIGNIFICANT CHANGE UP
FLUAV AG NPH QL: SIGNIFICANT CHANGE UP
FLUBV AG NPH QL: SIGNIFICANT CHANGE UP
GLUCOSE BLDC GLUCOMTR-MCNC: 114 MG/DL — HIGH (ref 70–99)
GLUCOSE BLDC GLUCOMTR-MCNC: 147 MG/DL — HIGH (ref 70–99)
GLUCOSE BLDC GLUCOMTR-MCNC: 176 MG/DL — HIGH (ref 70–99)
GLUCOSE BLDC GLUCOMTR-MCNC: 222 MG/DL — HIGH (ref 70–99)
GLUCOSE SERPL-MCNC: 95 MG/DL — SIGNIFICANT CHANGE UP (ref 70–99)
HCT VFR BLD CALC: 34.6 % — LOW (ref 39–50)
HGB BLD-MCNC: 10.9 G/DL — LOW (ref 13–17)
MAGNESIUM SERPL-MCNC: 1.9 MG/DL — SIGNIFICANT CHANGE UP (ref 1.6–2.6)
MCHC RBC-ENTMCNC: 30 PG — SIGNIFICANT CHANGE UP (ref 27–34)
MCHC RBC-ENTMCNC: 31.5 G/DL — LOW (ref 32–36)
MCV RBC AUTO: 95.3 FL — SIGNIFICANT CHANGE UP (ref 80–100)
NRBC # BLD: 0 /100 WBCS — SIGNIFICANT CHANGE UP (ref 0–0)
PHOSPHATE SERPL-MCNC: 2.2 MG/DL — LOW (ref 2.5–4.5)
PLATELET # BLD AUTO: 264 K/UL — SIGNIFICANT CHANGE UP (ref 150–400)
POTASSIUM SERPL-MCNC: 3.8 MMOL/L — SIGNIFICANT CHANGE UP (ref 3.5–5.3)
POTASSIUM SERPL-SCNC: 3.8 MMOL/L — SIGNIFICANT CHANGE UP (ref 3.5–5.3)
PROT SERPL-MCNC: 6.2 GM/DL — SIGNIFICANT CHANGE UP (ref 6–8.3)
RBC # BLD: 3.63 M/UL — LOW (ref 4.2–5.8)
RBC # FLD: 13.9 % — SIGNIFICANT CHANGE UP (ref 10.3–14.5)
SARS-COV-2 RNA SPEC QL NAA+PROBE: SIGNIFICANT CHANGE UP
SODIUM SERPL-SCNC: 142 MMOL/L — SIGNIFICANT CHANGE UP (ref 135–145)
WBC # BLD: 8.52 K/UL — SIGNIFICANT CHANGE UP (ref 3.8–10.5)
WBC # FLD AUTO: 8.52 K/UL — SIGNIFICANT CHANGE UP (ref 3.8–10.5)

## 2024-04-29 PROCEDURE — 99232 SBSQ HOSP IP/OBS MODERATE 35: CPT

## 2024-04-29 RX ORDER — LANOLIN ALCOHOL/MO/W.PET/CERES
3 CREAM (GRAM) TOPICAL ONCE
Refills: 0 | Status: COMPLETED | OUTPATIENT
Start: 2024-04-29 | End: 2024-04-29

## 2024-04-29 RX ORDER — POTASSIUM PHOSPHATE, MONOBASIC POTASSIUM PHOSPHATE, DIBASIC 236; 224 MG/ML; MG/ML
15 INJECTION, SOLUTION INTRAVENOUS ONCE
Refills: 0 | Status: COMPLETED | OUTPATIENT
Start: 2024-04-29 | End: 2024-04-29

## 2024-04-29 RX ORDER — SODIUM,POTASSIUM PHOSPHATES 278-250MG
1 POWDER IN PACKET (EA) ORAL
Refills: 0 | Status: COMPLETED | OUTPATIENT
Start: 2024-04-29 | End: 2024-05-02

## 2024-04-29 RX ORDER — PRIMIDONE 250 MG/1
50 TABLET ORAL
Refills: 0 | Status: DISCONTINUED | OUTPATIENT
Start: 2024-04-29 | End: 2024-04-30

## 2024-04-29 RX ADMIN — Medication 81 MILLIGRAM(S): at 11:57

## 2024-04-29 RX ADMIN — ATORVASTATIN CALCIUM 80 MILLIGRAM(S): 80 TABLET, FILM COATED ORAL at 21:14

## 2024-04-29 RX ADMIN — CHLORHEXIDINE GLUCONATE 1 APPLICATION(S): 213 SOLUTION TOPICAL at 05:16

## 2024-04-29 RX ADMIN — Medication 1 MILLIGRAM(S): at 21:14

## 2024-04-29 RX ADMIN — Medication 4: at 11:56

## 2024-04-29 RX ADMIN — Medication 2: at 16:53

## 2024-04-29 RX ADMIN — APIXABAN 5 MILLIGRAM(S): 2.5 TABLET, FILM COATED ORAL at 17:06

## 2024-04-29 RX ADMIN — Medication 25 MILLIGRAM(S): at 05:16

## 2024-04-29 RX ADMIN — SENNA PLUS 2 TABLET(S): 8.6 TABLET ORAL at 21:14

## 2024-04-29 RX ADMIN — Medication 3 MILLILITER(S): at 11:05

## 2024-04-29 RX ADMIN — Medication 3 MILLIGRAM(S): at 02:39

## 2024-04-29 RX ADMIN — NYSTATIN CREAM 1 APPLICATION(S): 100000 CREAM TOPICAL at 17:04

## 2024-04-29 RX ADMIN — Medication 750 MILLIGRAM(S): at 11:57

## 2024-04-29 RX ADMIN — PRIMIDONE 50 MILLIGRAM(S): 250 TABLET ORAL at 18:04

## 2024-04-29 RX ADMIN — Medication 3 MILLILITER(S): at 00:04

## 2024-04-29 RX ADMIN — INSULIN GLARGINE 30 UNIT(S): 100 INJECTION, SOLUTION SUBCUTANEOUS at 08:00

## 2024-04-29 RX ADMIN — Medication 1 PACKET(S): at 16:54

## 2024-04-29 RX ADMIN — Medication 1 PACKET(S): at 13:57

## 2024-04-29 RX ADMIN — POLYETHYLENE GLYCOL 3350 17 GRAM(S): 17 POWDER, FOR SOLUTION ORAL at 11:57

## 2024-04-29 RX ADMIN — POTASSIUM PHOSPHATE, MONOBASIC POTASSIUM PHOSPHATE, DIBASIC 62.5 MILLIMOLE(S): 236; 224 INJECTION, SOLUTION INTRAVENOUS at 04:30

## 2024-04-29 RX ADMIN — Medication 100 MILLIGRAM(S): at 02:59

## 2024-04-29 RX ADMIN — LEVETIRACETAM 1000 MILLIGRAM(S): 250 TABLET, FILM COATED ORAL at 17:06

## 2024-04-29 RX ADMIN — LEVETIRACETAM 1000 MILLIGRAM(S): 250 TABLET, FILM COATED ORAL at 05:17

## 2024-04-29 RX ADMIN — NYSTATIN CREAM 1 APPLICATION(S): 100000 CREAM TOPICAL at 05:16

## 2024-04-29 RX ADMIN — Medication 25 MILLIGRAM(S): at 17:06

## 2024-04-29 RX ADMIN — Medication 3 MILLILITER(S): at 05:43

## 2024-04-29 RX ADMIN — APIXABAN 5 MILLIGRAM(S): 2.5 TABLET, FILM COATED ORAL at 05:16

## 2024-04-29 RX ADMIN — Medication 3 MILLILITER(S): at 18:18

## 2024-04-29 NOTE — PROGRESS NOTE ADULT - ASSESSMENT
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 s/p trach/PEG on 12/5. Pt initially felt to have anoxic encephalopathy but MS improved. Eventually pt with removal of trach and PEG. Pt transferred [facility] on 4/14 2 to vomiting while on BiPAP; admitted with working dx of aspiration PNA. Today RRT called 2 to hypoxemia which improved, resp distress, lethargy and HTN. In the ICU pt intubated. ICU dx: Acute hypoxic and hypercapneic resp failure requiring intubation possibly 2 to aspiration. s/p extubation 4/23 and later in the evening was placed on NIV. Currently off NIV    Resp: BIPAP prn  ID: Off Abx  CVS: Cont pt BB/  Lasix prn  HEME: Cont Eliquis  FEN: Puree diet. passed bedside s/s.  Endo: Carefully cont Lantus + Lispro and adjust to FS  Renal: Follow BUN/Cr and UO  Neuro: Cont pt's Valproic acid and Keppra  Social: Pt is full code  Dispo: Downgraded to medicine    Dispo: pending RITA

## 2024-04-30 ENCOUNTER — TRANSCRIPTION ENCOUNTER (OUTPATIENT)
Age: 74
End: 2024-04-30

## 2024-04-30 LAB
GLUCOSE BLDC GLUCOMTR-MCNC: 132 MG/DL — HIGH (ref 70–99)
GLUCOSE BLDC GLUCOMTR-MCNC: 136 MG/DL — HIGH (ref 70–99)
GLUCOSE BLDC GLUCOMTR-MCNC: 215 MG/DL — HIGH (ref 70–99)
GLUCOSE BLDC GLUCOMTR-MCNC: 226 MG/DL — HIGH (ref 70–99)
GLUCOSE BLDC GLUCOMTR-MCNC: 226 MG/DL — HIGH (ref 70–99)
GLUCOSE BLDC GLUCOMTR-MCNC: 241 MG/DL — HIGH (ref 70–99)
TROPONIN I, HIGH SENSITIVITY RESULT: 72.2 NG/L — SIGNIFICANT CHANGE UP

## 2024-04-30 PROCEDURE — 93010 ELECTROCARDIOGRAM REPORT: CPT

## 2024-04-30 PROCEDURE — 99239 HOSP IP/OBS DSCHRG MGMT >30: CPT

## 2024-04-30 RX ORDER — NYSTATIN CREAM 100000 [USP'U]/G
1 CREAM TOPICAL
Qty: 0 | Refills: 0 | DISCHARGE
Start: 2024-04-30

## 2024-04-30 RX ORDER — TAMSULOSIN HYDROCHLORIDE 0.4 MG/1
1 CAPSULE ORAL
Qty: 30 | Refills: 0
Start: 2024-04-30 | End: 2024-05-29

## 2024-04-30 RX ORDER — SACUBITRIL AND VALSARTAN 24; 26 MG/1; MG/1
1 TABLET, FILM COATED ORAL
Refills: 0 | Status: DISCONTINUED | OUTPATIENT
Start: 2024-04-30 | End: 2024-04-30

## 2024-04-30 RX ORDER — ACETAMINOPHEN 500 MG
2 TABLET ORAL
Qty: 0 | Refills: 0 | DISCHARGE
Start: 2024-04-30

## 2024-04-30 RX ORDER — PRIMIDONE 250 MG/1
100 TABLET ORAL THREE TIMES A DAY
Refills: 0 | Status: DISCONTINUED | OUTPATIENT
Start: 2024-04-30 | End: 2024-05-02

## 2024-04-30 RX ORDER — PRIMIDONE 250 MG/1
2 TABLET ORAL
Qty: 0 | Refills: 0 | DISCHARGE
Start: 2024-04-30

## 2024-04-30 RX ORDER — SACUBITRIL AND VALSARTAN 24; 26 MG/1; MG/1
1 TABLET, FILM COATED ORAL
Qty: 0 | Refills: 0 | DISCHARGE
Start: 2024-04-30

## 2024-04-30 RX ORDER — METOPROLOL TARTRATE 50 MG
1 TABLET ORAL
Qty: 0 | Refills: 0 | DISCHARGE
Start: 2024-04-30

## 2024-04-30 RX ORDER — ATORVASTATIN CALCIUM 80 MG/1
1 TABLET, FILM COATED ORAL
Qty: 0 | Refills: 0 | DISCHARGE
Start: 2024-04-30

## 2024-04-30 RX ORDER — SACUBITRIL AND VALSARTAN 24; 26 MG/1; MG/1
1 TABLET, FILM COATED ORAL
Refills: 0 | Status: DISCONTINUED | OUTPATIENT
Start: 2024-04-30 | End: 2024-05-02

## 2024-04-30 RX ORDER — DOXAZOSIN MESYLATE 4 MG
1 TABLET ORAL
Qty: 0 | Refills: 0 | DISCHARGE
Start: 2024-04-30

## 2024-04-30 RX ADMIN — PRIMIDONE 100 MILLIGRAM(S): 250 TABLET ORAL at 21:40

## 2024-04-30 RX ADMIN — ATORVASTATIN CALCIUM 80 MILLIGRAM(S): 80 TABLET, FILM COATED ORAL at 21:40

## 2024-04-30 RX ADMIN — CHLORHEXIDINE GLUCONATE 1 APPLICATION(S): 213 SOLUTION TOPICAL at 06:01

## 2024-04-30 RX ADMIN — Medication 1 MILLIGRAM(S): at 21:41

## 2024-04-30 RX ADMIN — Medication 3 MILLILITER(S): at 17:08

## 2024-04-30 RX ADMIN — PRIMIDONE 100 MILLIGRAM(S): 250 TABLET ORAL at 13:05

## 2024-04-30 RX ADMIN — Medication 25 MILLIGRAM(S): at 06:01

## 2024-04-30 RX ADMIN — Medication 3 MILLILITER(S): at 11:22

## 2024-04-30 RX ADMIN — SENNA PLUS 2 TABLET(S): 8.6 TABLET ORAL at 21:41

## 2024-04-30 RX ADMIN — APIXABAN 5 MILLIGRAM(S): 2.5 TABLET, FILM COATED ORAL at 18:51

## 2024-04-30 RX ADMIN — NYSTATIN CREAM 1 APPLICATION(S): 100000 CREAM TOPICAL at 06:01

## 2024-04-30 RX ADMIN — Medication 4: at 21:45

## 2024-04-30 RX ADMIN — LEVETIRACETAM 1000 MILLIGRAM(S): 250 TABLET, FILM COATED ORAL at 06:01

## 2024-04-30 RX ADMIN — APIXABAN 5 MILLIGRAM(S): 2.5 TABLET, FILM COATED ORAL at 06:00

## 2024-04-30 RX ADMIN — Medication 3 MILLILITER(S): at 00:00

## 2024-04-30 RX ADMIN — PRIMIDONE 50 MILLIGRAM(S): 250 TABLET ORAL at 06:01

## 2024-04-30 RX ADMIN — NYSTATIN CREAM 1 APPLICATION(S): 100000 CREAM TOPICAL at 18:50

## 2024-04-30 RX ADMIN — INSULIN GLARGINE 30 UNIT(S): 100 INJECTION, SOLUTION SUBCUTANEOUS at 09:32

## 2024-04-30 RX ADMIN — Medication 1 PACKET(S): at 18:50

## 2024-04-30 RX ADMIN — Medication 81 MILLIGRAM(S): at 13:04

## 2024-04-30 RX ADMIN — Medication 1 PACKET(S): at 09:49

## 2024-04-30 RX ADMIN — Medication 4: at 17:31

## 2024-04-30 RX ADMIN — Medication 3 MILLILITER(S): at 05:25

## 2024-04-30 RX ADMIN — SACUBITRIL AND VALSARTAN 1 TABLET(S): 24; 26 TABLET, FILM COATED ORAL at 18:49

## 2024-04-30 RX ADMIN — Medication 750 MILLIGRAM(S): at 13:07

## 2024-04-30 RX ADMIN — LEVETIRACETAM 1000 MILLIGRAM(S): 250 TABLET, FILM COATED ORAL at 18:50

## 2024-04-30 RX ADMIN — POLYETHYLENE GLYCOL 3350 17 GRAM(S): 17 POWDER, FOR SOLUTION ORAL at 13:03

## 2024-04-30 RX ADMIN — Medication 4: at 12:52

## 2024-04-30 RX ADMIN — Medication 25 MILLIGRAM(S): at 18:51

## 2024-04-30 RX ADMIN — Medication 1 PACKET(S): at 13:04

## 2024-04-30 NOTE — SWALLOW BEDSIDE ASSESSMENT ADULT - DIET PRIOR TO ADMI
[facility] liquids as of 4/2/2024

## 2024-04-30 NOTE — SWALLOW BEDSIDE ASSESSMENT ADULT - MUCOSAL QUALITY
voice with hoarse quality but did not change post swallows ; good sustained phonation for repetition phrases

## 2024-04-30 NOTE — SWALLOW BEDSIDE ASSESSMENT ADULT - SLP GENERAL OBSERVATIONS
alert and stated his name ; pt willingly accepted all po trials and participated for exam ; alert and stated his name ; pt willingly accepted all po trials and participated for exam; followed simple directions for exam

## 2024-04-30 NOTE — DISCHARGE NOTE PROVIDER - NSDCCPCAREPLAN_GEN_ALL_CORE_FT
PRINCIPAL DISCHARGE DIAGNOSIS  Diagnosis: Pneumonia, aspiration  Assessment and Plan of Treatment: You completed a course of antibiotics, your symptoms are improving. Please follow up with your pulmonologist upon discharge      SECONDARY DISCHARGE DIAGNOSES  Diagnosis: Seizure  Assessment and Plan of Treatment: Continue your home medications as prescribed.    Diagnosis: Diabetes mellitus  Assessment and Plan of Treatment: Continue your insulin as prescribed. Follow up with your pcp and/or endocrinologist in 1-2 weeks to adjust insulin as needed    Diagnosis: Chronic CHF  Assessment and Plan of Treatment: Please continue your medications. You were started on Entresto as your blood pressures are stable. Please follow up with your cardiologist in 2 weeks    Diagnosis: Tremor  Assessment and Plan of Treatment: Your primidone dose was adjusted. Please follow up with your neurologist to monitor your symptoms and adjust the dose as needed     PRINCIPAL DISCHARGE DIAGNOSIS  Diagnosis: Pneumonia, aspiration  Assessment and Plan of Treatment: You completed a course of antibiotics, your symptoms are improving. Please follow up with your pulmonologist upon discharge. Continue AVAPS on discharge at bedtime. If AVAPs not available in rehab, can do bipap 12/5 and follow up outpatient with pulmonology.      SECONDARY DISCHARGE DIAGNOSES  Diagnosis: Seizure  Assessment and Plan of Treatment: Continue your home medications as prescribed.    Diagnosis: Diabetes mellitus  Assessment and Plan of Treatment: Continue your insulin as prescribed. Follow up with your pcp and/or endocrinologist in 1-2 weeks to adjust insulin as needed    Diagnosis: Chronic CHF  Assessment and Plan of Treatment: Please continue your medications. You were started on Entresto as your blood pressures are stable. Please follow up with your cardiologist in 2 weeks    Diagnosis: Tremor  Assessment and Plan of Treatment: Your primidone dose was adjusted. Please follow up with your neurologist to monitor your symptoms and adjust the dose as needed     PRINCIPAL DISCHARGE DIAGNOSIS  Diagnosis: Pneumonia, aspiration  Assessment and Plan of Treatment: You completed a course of antibiotics, your symptoms are improving. Please follow up with your pulmonologist upon discharge. Continue BIPAP on discharge at bedtime.      SECONDARY DISCHARGE DIAGNOSES  Diagnosis: Seizure  Assessment and Plan of Treatment: Continue your home medications as prescribed.    Diagnosis: Diabetes mellitus  Assessment and Plan of Treatment: Continue your insulin as prescribed. Follow up with your pcp and/or endocrinologist in 1-2 weeks to adjust insulin as needed    Diagnosis: Chronic CHF  Assessment and Plan of Treatment: Please continue your medications. You were started on Entresto as your blood pressures are stable. Please follow up with your cardiologist in 2 weeks    Diagnosis: Tremor  Assessment and Plan of Treatment: Your primidone dose was adjusted. Please follow up with your neurologist to monitor your symptoms and adjust the dose as needed

## 2024-04-30 NOTE — SWALLOW BEDSIDE ASSESSMENT ADULT - SWALLOW EVAL: RECOMMENDED DIET
Advance to MINCED/MOIST with MILDLY THICK Liquids Advance to MINCED/MOIST with MILDLY THICK Liquids for gradual transition

## 2024-04-30 NOTE — CHART NOTE - NSCHARTNOTEFT_GEN_A_CORE
Discussed with RT, patient was doing better with AVAPs current setting than bipap. Will see if patient can be discharged with avaps. If not, will need to try lower bipap settings

## 2024-04-30 NOTE — SWALLOW BEDSIDE ASSESSMENT ADULT - NS SPL SWALLOW CLINIC TRIAL FT
there are no differences in airway protection deficits between thin and thick liquids ;  however further assessment is indicated due to hx;  pt stated " very good" referring to thin liquids

## 2024-04-30 NOTE — SWALLOW BEDSIDE ASSESSMENT ADULT - ADDITIONAL RECOMMENDATIONS
Dysphagia therapy at [facility] for improved function and advancement of diet textures

## 2024-04-30 NOTE — DISCHARGE NOTE NURSING/CASE MANAGEMENT/SOCIAL WORK - PATIENT PORTAL LINK FT
You can access the FollowMyHealth Patient Portal offered by [facility] by registering at the following website: [email or web address]. By joining [facility]’s FollowMyHealth portal, you will also be able to view your health information using other applications (apps) compatible with our system.

## 2024-04-30 NOTE — DISCHARGE NOTE PROVIDER - NSDCCAREPROVSEEN_GEN_ALL_CORE_FT
Soren, Harjeet Harden, Sasha Lenz, Ottoniel Barrett, Kelsea Finnegan, Renee Corey, Sadie Lindquist, Elijah Melendez, Molly Herrera, Selena Meehan, Job Cramer, Biju Strong, Rayray Jacinto, Rachell Lopez, Evelyn Moran, Reggie Villarreal, Florin

## 2024-04-30 NOTE — SWALLOW BEDSIDE ASSESSMENT ADULT - SWALLOW EVAL: DIAGNOSIS
Oropharyngeal dysphagia with reduced coordination of breathing and swallowing and complicated hospital course ; Pt extubated 4/23 Oropharyngeal dysphagia with weakness and s/p extubation 4/23; there is reduced coordination of breathing and swallowing , however general function is improved since admission

## 2024-04-30 NOTE — DISCHARGE NOTE NURSING/CASE MANAGEMENT/SOCIAL WORK - CAREGIVER RELATION TO PATIENT
Patient calling stating that he has started taking the generic Zyrtec 10 MG and would like to make sure this is okay to take for his upcoming procedure on 06/16.    Thank you    Son

## 2024-04-30 NOTE — DISCHARGE NOTE PROVIDER - ATTENDING DISCHARGE PHYSICAL EXAMINATION:
PHYSICAL EXAM:    Vital Signs Last 24 Hrs  T(C): 37.2 (30 Apr 2024 06:00), Max: 37.4 (29 Apr 2024 22:24)  T(F): 98.9 (30 Apr 2024 06:00), Max: 99.3 (29 Apr 2024 22:24)  HR: 76 (30 Apr 2024 11:20) (67 - 93)  BP: 132/69 (30 Apr 2024 06:00) (132/69 - 155/86)  BP(mean): 95 (29 Apr 2024 19:10) (95 - 108)  RR: 18 (30 Apr 2024 06:00) (18 - 32)  SpO2: 97% (30 Apr 2024 11:20) (95% - 100%)    General: No acute distress, +voice tremors   HEENT: No scleral icterus or injection.   Neck: Supple  Heart: RRR.  Normal S1 and S2.    Lungs: CTAB. No wheezes, crackles, or rhonchi.    Abdomen: BS+, soft, NT/ND.    Skin: Warm and dry.  No rashes.  Extremities: No edema, clubbing, or cyanosis  Musculoskeletal: No deformities  Neuro: A&Ox3.  Moving all extremities, following commands        General: No acute distress, +voice tremors   HEENT: No scleral icterus or injection.   Neck: Supple  Heart: RRR.  Normal S1 and S2.    Lungs: CTAB. No wheezes, crackles, or rhonchi.    Abdomen: BS+, soft, NT/ND.    Skin: Warm and dry.  No rashes.  Extremities: No edema, clubbing, or cyanosis  Musculoskeletal: No deformities  Neuro: A&Ox3.  Moving all extremities, following commands

## 2024-04-30 NOTE — SWALLOW BEDSIDE ASSESSMENT ADULT - COMMENTS
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 s/p trach/PEG on 12/5. Pt initially felt to have anoxic encephalopathy but MS improved. Eventually pt with removal of trach and PEG. Pt transferred fro [facility] on 4/14 2 to [facility] while on BiPAP; admitted with working dx of aspiration PNA.    4/26 CXR LUNGS: Mild interstitial edema. There is opacity at the left base   compatible with effusion/infiltrate.. This is worse than previously seen.  4/30 care mglawrence beaver. is being discharged on this date back to [facility]. 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 Pt initially felt to have anoxic encephalopathy but MS improved. Eventually pt with removal of trach and PEG. Pt transferred fro [facility] on 4/14 4/26 CXR LUNGS: Mild interstitial edema. There is opacity at the left base compatible with effusion/infiltrate.. This is worse than previously seen.  4/30 care mgr Pt is being discharged on this date back to [facility]. PMH COPD, CAD s/p PCI with stent 2015 and CABG 2014, chronic diastolic heart failure (EF 50%), 2nd degree AV block s/p medtronic PPM, HTN, DM, CKD 3, and CVA (lacunar infarct) and prolonged ICU/hospitalization starting November 2022;  s/p trach/PEG on 12/5. Pt initially felt to have anoxic encephalopathy but MS improved. Eventually pt with removal of trach and PEG. Pt transferred back to [facility] on 4/14 4/26 CXR LUNGS: Mild interstitial edema. There is opacity at the left base compatible with effusion/infiltrate.. This is worse than previously seen.  4/30 care mgr Pt is being discharged on this date back to [facility].

## 2024-04-30 NOTE — SWALLOW BEDSIDE ASSESSMENT ADULT - SWALLOW EVAL: PATIENT/FAMILY GOALS STATEMENT
pt interacted verbally with unintelligible speech output ; on repetition of words his clarity did improve; pt responded " it goes down, it's very good" appropriately

## 2024-04-30 NOTE — DISCHARGE NOTE PROVIDER - CARE PROVIDER_API CALL
Glenn Turner  Cardiovascular Disease  [address] [place] [address]  Phone: [phone]  Fax: [phone]  Follow Up Time:

## 2024-04-30 NOTE — DISCHARGE NOTE NURSING/CASE MANAGEMENT/SOCIAL WORK - NSDCPEFALRISK_GEN_ALL_CORE
For information on Fall & Injury Prevention, visit: [email or web address] OR  [email or web address] OR  https://www.cdc.gov/steadi/patient.html

## 2024-04-30 NOTE — SWALLOW BEDSIDE ASSESSMENT ADULT - SLP PERTINENT HISTORY OF CURRENT PROBLEM
Admitted with working dx of aspiration PNA. Today RRT called 2 to hypoxemia which improved, resp distress, lethargy and HTN. In the ICU pt intubated.

## 2024-04-30 NOTE — DISCHARGE NOTE PROVIDER - HOSPITAL COURSE
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 s/p trach/PEG on 12/5. Pt initially felt to have anoxic encephalopathy but MS improved. Eventually pt with removal of trach and PEG. Pt transferred [facility] on 4/14 2 to vomiting while on BiPAP; admitted with working dx of aspiration PNA. Today RRT called 2 to hypoxemia which improved, resp distress, lethargy and HTN. In the ICU pt intubated. ICU dx: Acute hypoxic and hypercapneic resp failure requiring intubation possibly 2 to aspiration. s/p extubation 4/23 and later in the evening was placed on NIV. Currently off NIV    Resp: BIPAP prn  ID: Off Abx  CVS: Cont pt BB/  Lasix prn  HEME: Cont Eliquis  FEN: Puree diet. passed bedside s/s.  Endo: Carefully cont Lantus + Lispro and adjust to FS  Renal: Follow BUN/Cr and UO  Neuro: Cont pt's Valproic acid and Keppra  Social: Pt is full code  Dispo: Downgraded to medicine    Dispo: DC to [facility]  D/W Son    Primidone dose adjusted as pt's tremors worsening but outpt high dose of 600mg/day was causing pt to become lethargic  Entresto started  BIPAP settings remain same as outpatient pulm recs 73 yo M with h/o COPD, CAD s/p PCI with stent 2015 and CABG 2014, chronic diastolic heart failure (EF 50%), 2nd degree AV block s/p medtronic PPM, HTN, DM, CKD 3, and CVA (lacunar infarct) and prolonged ICU/hospitalization starting 11/2022 2 to cardiac arrest with admitting dx: 1) Hypoglycemia 2) PEA arrest 3) Takotsubo cardiomyopathy found on Echo 4) New onset Sz either 2 to hypoglycemia vs anoxic injury 5) ELMER 2 to ATN 6) Shock liver. s/p trach/PEG on 12/5. Pt initially felt to have anoxic encephalopathy but MS improved. Eventually pt with removal of trach and PEG. Pt transferred [facility] on 4/14 2 to vomiting while on BiPAP; admitted with working dx of aspiration PNA. Today RRT called 2 to hypoxemia which improved, resp distress, lethargy and HTN. In the ICU pt intubated. ICU dx: Acute hypoxic and hypercapneic resp failure requiring intubation possibly 2 to aspiration. s/p extubation 4/23 and later in the evening was placed on NIV. Currently off NIV    Resp: BIPAP prn  ID: Off Abx  CVS: Cont pt BB/  Lasix prn  HEME: Cont Eliquis  FEN: Puree diet. passed bedside s/s.  Endo: Carefully cont Lantus + Lispro and adjust to FS  Renal: Follow BUN/Cr and UO  Neuro: Cont pt's Valproic acid and Keppra  Social: Pt is full code  Dispo: Downgraded to medicine    Dispo: DC to [facility]  D/W Son    Primidone dose adjusted as pt's tremors worsening but outpt high dose of 600mg/day was causing pt to become lethargic  Entresto started  Discussed with RT, patient does better with AVAPS. Will dc with AVAPs fio2 30%, expiratory pressure 6, back up rate 16, tidal volume 400  maximum pressure 28  minimum pressure 8    If no AVAPs available on DC, then will try bipap 12/5 as patient did not tolerate 18/8     73 yo M with h/o COPD, CAD s/p PCI with stent 2015 and CABG 2014, chronic diastolic heart failure (EF 50%), 2nd degree AV block s/p medtronic PPM, HTN, DM, CKD 3, and CVA (lacunar infarct) and prolonged ICU/hospitalization starting 11/2022 2 to cardiac arrest with admitting dx: 1) Hypoglycemia 2) PEA arrest 3) Takotsubo cardiomyopathy found on Echo 4) New onset Sz either 2 to hypoglycemia vs anoxic injury 5) ELMER 2 to ATN 6) Shock liver. s/p trach/PEG on 12/5. Pt initially felt to have anoxic encephalopathy but MS improved. Eventually pt with removal of trach and PEG. Pt transferred [facility] on 4/14 2 to vomiting while on BiPAP; admitted with working dx of aspiration PNA. Today RRT called 2 to hypoxemia which improved, resp distress, lethargy and HTN. In the ICU pt intubated. ICU dx: Acute hypoxic and hypercapneic resp failure requiring intubation possibly 2 to aspiration. s/p extubation 4/23 and later in the evening was placed on NIV. Currently off NIV at night     #ARDS, resolved  #Acute hypoxic respiratory failure   #Pneumonia, aspiration.   · Pt transferred [facility] on 4/14 2 to vomiting while on BiPAP; admitted with working dx of aspiration PNA. RRT called 2 to hypoxemia, resp distress, lethargy and HTN. In the ICU pt intubated. ICU dx: Acute hypoxic and hypercapneic resp failure requiring intubation possibly 2 to aspiration. s/p extubation 4/23 and later in the evening was placed on NIV. S/p Antibiotics. Tolerating Bipap on current setting. Transfer to [facility] tomorrow if he tolerated current Bipap settings.     # Seizure.   · C/w Keppra 1000mg, Valproic 750mg, Primidone 100mg   - Seizure precaution.    # Diabetes mellitus.   ·  ISS with hypoglycemia protocol     Stable for d/c to [facility]

## 2024-04-30 NOTE — DISCHARGE NOTE PROVIDER - NSDCFUSCHEDAPPT_GEN_ALL_CORE_FT
Glenn Turner  [facility]  CARDIOLOGY [id number]  Scheduled Appointment: 05/20/2024    Damian King  [facility]  CARDIOLOGY [address]  Scheduled Appointment: 05/24/2024    [facility]  NEUROLOGY  Comm D  Scheduled Appointment: 05/30/2024    [facility]  ELECTROPH [phone]  Scheduled Appointment: 07/11/2024

## 2024-04-30 NOTE — DISCHARGE NOTE PROVIDER - NSDCMRMEDTOKEN_GEN_ALL_CORE_FT
acetaminophen 325 mg oral tablet: 2 tab(s) orally every 6 hours As needed Temp greater or equal to 38C (100.4F), Mild Pain (1 - 3)  albuterol 90 mcg/inh inhalation aerosol: 2 puff(s) inhaled every 6 hours, As Needed -for shortness of breath and/or wheezing   apixaban 5 mg oral tablet: 1 tab(s) orally every 12 hours  aspirin 81 mg oral delayed release tablet: 1 tab(s) orally once a day  atorvastatin 80 mg oral tablet: 1 tab(s) orally once a day (at bedtime)  cyanocobalamin 1000 mcg oral tablet: 1 tab(s) orally once a day  doxazosin 1 mg oral tablet: 1 tab(s) orally once a day (at bedtime)  insulin glargine 100 units/mL subcutaneous solution: 10 unit(s) subcutaneous once a day (at bedtime)  ipratropium-albuterol 0.5 mg-2.5 mg/3 mL inhalation solution: 3 milliliter(s) inhaled every 6 hours As needed Shortness of Breath and/or Wheezing  Lasix 20 mg oral tablet: 1 tab(s) orally once a day (at bedtime)  levETIRAcetam 1000 mg oral tablet: 1 tab(s) orally 2 times a day  Liquifilm Tears preserved ophthalmic solution: 1 drop(s) to each affected eye 2 times a day  metoprolol tartrate 25 mg oral tablet: 1 tab(s) orally 2 times a day  Multiple Vitamins with Minerals oral liquid: 1  orally once a day  nystatin 100,000 units/g topical powder: 1 Apply topically to affected area 2 times a day  polyethylene glycol 3350 oral powder for reconstitution: 17 gram(s) orally 2 times a day  primidone 50 mg oral tablet: 2 tab(s) orally 3 times a day  sacubitril-valsartan 24 mg-26 mg oral tablet: 1 tab(s) orally 2 times a day  senna leaf extract oral tablet: 2 tab(s) orally once a day (at bedtime)  tiotropium 2.5 mcg/inh inhalation aerosol: 2 puff(s) inhaled once a day  valproic acid 250 mg oral capsule: 3 cap(s) orally once a day

## 2024-05-01 LAB
GLUCOSE BLDC GLUCOMTR-MCNC: 166 MG/DL — HIGH (ref 70–99)
GLUCOSE BLDC GLUCOMTR-MCNC: 177 MG/DL — HIGH (ref 70–99)
GLUCOSE BLDC GLUCOMTR-MCNC: 307 MG/DL — HIGH (ref 70–99)
GLUCOSE BLDC GLUCOMTR-MCNC: 324 MG/DL — HIGH (ref 70–99)

## 2024-05-01 PROCEDURE — 99233 SBSQ HOSP IP/OBS HIGH 50: CPT

## 2024-05-01 PROCEDURE — 93010 ELECTROCARDIOGRAM REPORT: CPT

## 2024-05-01 RX ADMIN — Medication 3 MILLILITER(S): at 06:02

## 2024-05-01 RX ADMIN — Medication 1 PACKET(S): at 18:46

## 2024-05-01 RX ADMIN — Medication 2: at 11:44

## 2024-05-01 RX ADMIN — Medication 650 MILLIGRAM(S): at 08:51

## 2024-05-01 RX ADMIN — Medication 8: at 17:55

## 2024-05-01 RX ADMIN — Medication 3 MILLILITER(S): at 23:51

## 2024-05-01 RX ADMIN — LEVETIRACETAM 1000 MILLIGRAM(S): 250 TABLET, FILM COATED ORAL at 05:37

## 2024-05-01 RX ADMIN — SACUBITRIL AND VALSARTAN 1 TABLET(S): 24; 26 TABLET, FILM COATED ORAL at 05:38

## 2024-05-01 RX ADMIN — CHLORHEXIDINE GLUCONATE 1 APPLICATION(S): 213 SOLUTION TOPICAL at 05:34

## 2024-05-01 RX ADMIN — Medication 81 MILLIGRAM(S): at 11:48

## 2024-05-01 RX ADMIN — LEVETIRACETAM 1000 MILLIGRAM(S): 250 TABLET, FILM COATED ORAL at 18:46

## 2024-05-01 RX ADMIN — Medication 8: at 22:38

## 2024-05-01 RX ADMIN — NYSTATIN CREAM 1 APPLICATION(S): 100000 CREAM TOPICAL at 18:47

## 2024-05-01 RX ADMIN — Medication 650 MILLIGRAM(S): at 09:20

## 2024-05-01 RX ADMIN — Medication 1 PACKET(S): at 08:25

## 2024-05-01 RX ADMIN — SACUBITRIL AND VALSARTAN 1 TABLET(S): 24; 26 TABLET, FILM COATED ORAL at 18:47

## 2024-05-01 RX ADMIN — Medication 3 MILLILITER(S): at 11:05

## 2024-05-01 RX ADMIN — Medication 3 MILLILITER(S): at 00:33

## 2024-05-01 RX ADMIN — Medication 750 MILLIGRAM(S): at 11:49

## 2024-05-01 RX ADMIN — PRIMIDONE 100 MILLIGRAM(S): 250 TABLET ORAL at 22:41

## 2024-05-01 RX ADMIN — PRIMIDONE 100 MILLIGRAM(S): 250 TABLET ORAL at 13:21

## 2024-05-01 RX ADMIN — ATORVASTATIN CALCIUM 80 MILLIGRAM(S): 80 TABLET, FILM COATED ORAL at 22:41

## 2024-05-01 RX ADMIN — APIXABAN 5 MILLIGRAM(S): 2.5 TABLET, FILM COATED ORAL at 18:46

## 2024-05-01 RX ADMIN — Medication 25 MILLIGRAM(S): at 05:38

## 2024-05-01 RX ADMIN — PRIMIDONE 100 MILLIGRAM(S): 250 TABLET ORAL at 05:37

## 2024-05-01 RX ADMIN — SENNA PLUS 2 TABLET(S): 8.6 TABLET ORAL at 22:41

## 2024-05-01 RX ADMIN — APIXABAN 5 MILLIGRAM(S): 2.5 TABLET, FILM COATED ORAL at 05:37

## 2024-05-01 RX ADMIN — NYSTATIN CREAM 1 APPLICATION(S): 100000 CREAM TOPICAL at 05:41

## 2024-05-01 RX ADMIN — Medication 2: at 08:23

## 2024-05-01 RX ADMIN — Medication 3 MILLILITER(S): at 17:09

## 2024-05-01 RX ADMIN — Medication 25 MILLIGRAM(S): at 18:46

## 2024-05-01 RX ADMIN — Medication 1 MILLIGRAM(S): at 22:40

## 2024-05-01 RX ADMIN — Medication 1 PACKET(S): at 11:47

## 2024-05-01 RX ADMIN — POLYETHYLENE GLYCOL 3350 17 GRAM(S): 17 POWDER, FOR SOLUTION ORAL at 11:46

## 2024-05-01 NOTE — PROGRESS NOTE ADULT - SUBJECTIVE AND OBJECTIVE BOX
CC: Patient is a 73y old  Male who presents with a chief complaint of PNEUMONIA, ASPIRATION     (18 Apr 2024 15:05)      ## HPI:HPI:  73-year-old male with a PMH of  DM, HTN, Seizure, TBI, COPD CHF cardiac arrest BPH Presents to the ED from [facility] for Dyspnea. At baseline, patient uses BiPap intermittently throughout the rohan, patient had an episode of vomiting while on the Bipap. Then continued to have SOB for about an hour, sent to the ED for further evaluation. Upon ED arrival patient was tachypneic with increase work of breathing, placed on Bipap. Upon evaluation, patient is AAOx3, No distress, able to communicate full sentence (at his own pace due to CVA residual). Labs remarkable for WBC:15.30, K:5.9, troponin 277, ECG-NSR no T-wave or ST changes, BP:146/83, HR: 83 Temp: 100.9. Received Zosyn, Tylenol, Kayexalate, Humulin 5U, Albuterol. (14 Apr 2024 07:04)      O/N:  no acute events, on sbt  ## ROS:  follows commands  ## EXAM  ICU Vital Signs Last 24 Hrs  T(C): 38.1 (19 Apr 2024 08:00), Max: 38.3 (19 Apr 2024 02:00)  T(F): 100.6 (19 Apr 2024 08:00), Max: 100.9 (19 Apr 2024 02:00)  HR: 72 (19 Apr 2024 12:00) (65 - 79)  BP: 177/79 (19 Apr 2024 12:00) (107/57 - 177/79)  BP(mean): 105 (19 Apr 2024 12:00) (72 - 105)  ABP: --  ABP(mean): --  RR: 18 (19 Apr 2024 12:00) (17 - 33)  SpO2: 98% (19 Apr 2024 12:00) (91% - 100%)    O2 Parameters below as of 19 Apr 2024 01:40  Patient On (Oxygen Delivery Method): ventilator          CON : NAD  EENT : EOMI, MMM  NECK : Full ROM  RESP : CTAB no increased WOB  CARD : rrr no m/r/g  ABD : S NT ND NABS no rebound  EXT : No edema  NEURO : follwos commands  ## Labs:  Lab Results:  CBC  CBC Full  -  ( 19 Apr 2024 03:15 )  WBC Count : 9.59 K/uL  RBC Count : 4.30 M/uL  Hemoglobin : 13.0 g/dL  Hematocrit : 40.6 %  Platelet Count - Automated : 186 K/uL  Mean Cell Volume : 94.4 fl  Mean Cell Hemoglobin : 30.2 pg  Mean Cell Hemoglobin Concentration : 32.0 g/dL  Auto Neutrophil # : 5.35 K/uL  Auto Lymphocyte # : 2.30 K/uL  Auto Monocyte # : 0.89 K/uL  Auto Eosinophil # : 0.89 K/uL  Auto Basophil # : 0.11 K/uL  Auto Neutrophil % : 55.8 %  Auto Lymphocyte % : 24.0 %  Auto Monocyte % : 9.3 %  Auto Eosinophil % : 9.3 %  Auto Basophil % : 1.1 %    .		Differential:	[] Automated		[] Manual  Chemistry                        13.0   9.59  )-----------( 186      ( 19 Apr 2024 03:15 )             40.6     04-19    143  |  109<H>  |  19  ----------------------------<  245<H>  3.5   |  27  |  1.00    Ca    8.9      19 Apr 2024 03:15  Phos  2.9     04-19  Mg     2.0     04-19    TPro  6.3  /  Alb  1.8<L>  /  TBili  0.3  /  DBili  x   /  AST  17  /  ALT  19  /  AlkPhos  65  04-19    LIVER FUNCTIONS - ( 19 Apr 2024 03:15 )  Alb: 1.8 g/dL / Pro: 6.3 gm/dL / ALK PHOS: 65 U/L / ALT: 19 U/L / AST: 17 U/L / GGT: x             Urinalysis Basic - ( 19 Apr 2024 03:15 )    Color: x / Appearance: x / SG: x / pH: x  Gluc: 245 mg/dL / Ketone: x  / Bili: x / Urobili: x   Blood: x / Protein: x / Nitrite: x   Leuk Esterase: x / RBC: x / WBC x   Sq Epi: x / Non Sq Epi: x / Bacteria: x        ABG - ( 17 Apr 2024 21:39 )  pH, Arterial: 7.44  pH, Blood: x     /  pCO2: 41    /  pO2: 91    / HCO3: 28    / Base Excess: 3.3   /  SaO2: 96.0            MICROBIOLOGY/CULTURES:  Culture Results:   No growth at 24 hours (04-17 @ 09:50)  Culture Results:   Growth in anaerobic bottle: Gram Positive Cocci in Clusters  Direct identification is available within approximately 3-5  hours either by Blood Panel Multiplexed PCR or Direct  MALDI-TOF. Details: [email or web address] (04-17 @ 09:50)  Culture Results:   No growth (04-16 @ 18:30)    RADIOLOGY RESULTS:        ## Medications:  MEDICATIONS  (STANDING):  albuterol/ipratropium for Nebulization 3 milliLiter(s) Nebulizer every 6 hours  apixaban 5 milliGRAM(s) Oral every 12 hours  aspirin enteric coated 81 milliGRAM(s) Oral daily  atorvastatin 80 milliGRAM(s) Oral at bedtime  chlorhexidine 0.12% Liquid 15 milliLiter(s) Oral Mucosa every 12 hours  chlorhexidine 2% Cloths 1 Application(s) Topical <User Schedule>  dexMEDEtomidine Infusion 0.2 MICROgram(s)/kG/Hr (4.46 mL/Hr) IV Continuous <Continuous>  fentaNYL   Infusion. 0.5 MICROgram(s)/kG/Hr (6.33 mL/Hr) IV Continuous <Continuous>  insulin glargine Injectable (LANTUS) 15 Unit(s) SubCutaneous at bedtime  insulin lispro (ADMELOG) corrective regimen sliding scale   SubCutaneous every 6 hours  insulin lispro Injectable (ADMELOG) 4 Unit(s) SubCutaneous every 6 hours  levETIRAcetam 1000 milliGRAM(s) Oral two times a day  metoprolol tartrate 25 milliGRAM(s) Oral two times a day  piperacillin/tazobactam IVPB.. 3.375 Gram(s) IV Intermittent every 8 hours  polyethylene glycol 3350 17 Gram(s) Oral daily  senna 2 Tablet(s) Oral at bedtime  tamsulosin 0.4 milliGRAM(s) Oral at bedtime  valproic acid 750 milliGRAM(s) Oral daily    ## O/E:I&O's Summary    18 Apr 2024 07:01  -  19 Apr 2024 07:00  --------------------------------------------------------  IN: 1816.4 mL / OUT: 1805 mL / NET: 11.4 mL        POCUS :   DVT PPX eliquis  ## Code status:  Goals of care discussion: [X] yes [ ] no -discussed with son at bedside, full code  [x] full code  [ ] DNR  [ ] DNI  [ ] DANITZAST  
Patient is a 73y old  Male who presents with a chief complaint of Aspiration Pneumonia (15 Apr 2024 12:10)      INTERVAL HPI/OVERNIGHT EVENTS: No acute events overnight, afebrile.     MEDICATIONS  (STANDING):  apixaban 5 milliGRAM(s) Oral every 12 hours  aspirin enteric coated 81 milliGRAM(s) Oral daily  atorvastatin 80 milliGRAM(s) Oral at bedtime  budesonide 160 MICROgram(s)/formoterol 4.5 MICROgram(s) Inhaler 2 Puff(s) Inhalation two times a day  dextrose 10% Bolus 125 milliLiter(s) IV Bolus once  dextrose 5%. 1000 milliLiter(s) (50 mL/Hr) IV Continuous <Continuous>  dextrose 50% Injectable 25 Gram(s) IV Push once  glucagon  Injectable 1 milliGRAM(s) IntraMuscular once  insulin lispro (ADMELOG) corrective regimen sliding scale   SubCutaneous three times a day before meals  levETIRAcetam 1000 milliGRAM(s) Oral two times a day  metoprolol tartrate 25 milliGRAM(s) Oral two times a day  piperacillin/tazobactam IVPB.. 3.375 Gram(s) IV Intermittent every 8 hours  sodium chloride 0.9%. 1000 milliLiter(s) (80 mL/Hr) IV Continuous <Continuous>  tamsulosin 0.4 milliGRAM(s) Oral at bedtime  valproic acid 750 milliGRAM(s) Oral daily    MEDICATIONS  (PRN):  acetaminophen     Tablet .. 650 milliGRAM(s) Oral every 6 hours PRN Temp greater or equal to 38C (100.4F), Mild Pain (1 - 3)  albuterol    90 MICROgram(s) HFA Inhaler 2 Puff(s) Inhalation every 6 hours PRN Shortness of Breath and/or Wheezing  aluminum hydroxide/magnesium hydroxide/simethicone Suspension 30 milliLiter(s) Oral every 4 hours PRN Dyspepsia  dextrose Oral Gel 15 Gram(s) Oral once PRN Blood Glucose LESS THAN 70 milliGRAM(s)/deciliter  melatonin 3 milliGRAM(s) Oral at bedtime PRN Insomnia  ondansetron Injectable 4 milliGRAM(s) IV Push every 8 hours PRN Nausea and/or Vomiting      Allergies    No Known Allergies    Intolerances        REVIEW OF SYSTEMS:  CONSTITUTIONAL: +ve for fatigue  EYES: No eye pain, visual disturbances, or discharge  ENMT:  No difficulty hearing, tinnitus, vertigo; No sinus or throat pain  NECK: No pain or stiffness      Vital Signs Last 24 Hrs  T(C): 37.1 (16 Apr 2024 10:33), Max: 37.1 (16 Apr 2024 10:33)  T(F): 98.8 (16 Apr 2024 10:33), Max: 98.8 (16 Apr 2024 10:33)  HR: 79 (16 Apr 2024 10:33) (70 - 79)  BP: 174/82 (16 Apr 2024 10:33) (148/72 - 174/82)  BP(mean): --  RR: 16 (16 Apr 2024 10:33) (16 - 18)  SpO2: 95% (16 Apr 2024 10:33) (95% - 100%)    Parameters below as of 16 Apr 2024 04:37  Patient On (Oxygen Delivery Method): BiPAP/CPAP        PHYSICAL EXAM:    HEAD:  Atraumatic, Normocephalic  EYES: EOMI, PERRLA, conjunctiva and sclera clear  ENMT: No tonsillar erythema, exudates, or enlargement; Moist mucous membranes, Good dentition, No lesions  NECK: Supple, No JVD, Normal thyroid      LABS:                        13.2   9.64  )-----------( 175      ( 16 Apr 2024 07:40 )             41.1     04-15    142  |  106  |  38<H>  ----------------------------<  230<H>  3.7   |  27  |  1.13    Ca    8.2<L>      15 Apr 2024 05:25        Urinalysis Basic - ( 15 Apr 2024 05:25 )    Color: x / Appearance: x / SG: x / pH: x  Gluc: 230 mg/dL / Ketone: x  / Bili: x / Urobili: x   Blood: x / Protein: x / Nitrite: x   Leuk Esterase: x / RBC: x / WBC x   Sq Epi: x / Non Sq Epi: x / Bacteria: x      CAPILLARY BLOOD GLUCOSE      POCT Blood Glucose.: 245 mg/dL (16 Apr 2024 10:57)  POCT Blood Glucose.: 171 mg/dL (16 Apr 2024 07:39)  POCT Blood Glucose.: 257 mg/dL (15 Apr 2024 21:18)  POCT Blood Glucose.: 254 mg/dL (15 Apr 2024 16:45)      
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 s/p trach/PEG on . Pt initially felt to have anoxic encephalopathy but MS improved. Eventually pt with removal of trach and PEG. Pt transferred fro [facility] on  2 to vomiting while on BiPAP; admitted with working dx of aspiration PNA. Today RRT called 2 to hypoxemia which improved, resp distress, lethargy and HTN. In the ICU pt intubated. ICU dx: Acute hypoxic and hypercapneic resp failure requiring intubation possibly 2 to aspiration. s/p extubation  and later in the evening was placed on NIV. Currently off NIV      ## Labs:  CBC:                        12.8   17.27 )-----------( 339      ( 2024 03:20 )             39.7     Chem:  -    144  |  107  |  25<H>  ----------------------------<  180<H>  3.5   |  32<H>  |  0.70    Ca    9.4      2024 03:20  Phos  2.4     04-25  Mg     2.3     -25    TPro  6.8  /  Alb  1.8<L>  /  TBili  0.3  /  DBili  x   /  AST  29  /  ALT  24  /  AlkPhos  66  04-25    Coags:          ## Imaging:    ## Medications:    doxazosin 1 milliGRAM(s) Oral at bedtime  metoprolol tartrate 25 milliGRAM(s) Oral two times a day    albuterol    90 MICROgram(s) HFA Inhaler 2 Puff(s) Inhalation every 6 hours PRN  albuterol/ipratropium for Nebulization 3 milliLiter(s) Nebulizer every 6 hours    atorvastatin 80 milliGRAM(s) Oral at bedtime  insulin glargine Injectable (LANTUS) 30 Unit(s) SubCutaneous every morning  insulin lispro (ADMELOG) corrective regimen sliding scale   SubCutaneous every 6 hours  insulin lispro Injectable (ADMELOG) 10 Unit(s) SubCutaneous every 6 hours    apixaban 5 milliGRAM(s) Oral every 12 hours  aspirin  chewable 81 milliGRAM(s) Oral daily    polyethylene glycol 3350 17 Gram(s) Oral daily  senna 2 Tablet(s) Oral at bedtime    acetaminophen     Tablet .. 650 milliGRAM(s) Oral every 6 hours PRN  dexMEDEtomidine Infusion 0.2 MICROgram(s)/kG/Hr IV Continuous <Continuous>  levETIRAcetam 1000 milliGRAM(s) Oral two times a day  ondansetron Injectable 4 milliGRAM(s) IV Push every 8 hours PRN  valproic  acid Syrup 750 milliGRAM(s) Oral daily      ## Vitals:  T(C): 37.7 (24 @ 08:00), Max: 38.1 (24 @ 21:00)  HR: 81 (24 @ 11:00) (66 - 126)  BP: 95/78 (24 @ 11:00) (95/78 - 170/101)  BP(mean): 85 (24 @ 11:00) (69 - 113)  RR: 28 (24 @ 11:00) (16 - 43)  SpO2: 100% (24 @ 11:00) (95% - 100%)  Wt(kg): --  Vent:   AB-24 @ 07:  -   @ 07:00  --------------------------------------------------------  IN: 673.1 mL / OUT: 1700 mL / NET: -1026.9 mL     @ 07:01  -   @ 11:29  --------------------------------------------------------  IN: 399 mL / OUT: 0 mL / NET: 399 mL          ## P/E:  Gen: lying comfortably in bed in no apparent distress  Face: (+) NGT  Lungs: B/l rhonchi  Heart: Paced  Abd: Soft/+BS/ Non-tender  Ext: Edema  Neuro: Awake, alert, interactive    CENTRAL LINE: [ ] YES [ ] NO  LOCATION:   DATE INSERTED:  REMOVE: [ ] YES [ ] NO      LUJAN: [ ] YES [ ] NO    DATE INSERTED:  REMOVE:  [ ] YES [ ] NO      A-LINE:  [ ] YES [ ] NO  LOCATION:   DATE INSERTED:  REMOVE:  [ ] YES [ ] NO  EXPLAIN:    CODE STATUS: [x ] full code  [ ] DNR  [ ] DNI  [ ] MOLST  Goals of care discussion: [ ] yes   
PROGRESS NOTE:   Authored by Dr. Molly Melendez MD, Available on MS Teams    Patient is a 73y old  Male who presents with a chief complaint of Aspiration Pneumonia (28 Apr 2024 11:19)      SUBJECTIVE / OVERNIGHT EVENTS: Discussed with nurse, patient tolerating feeds.     ADDITIONAL REVIEW OF SYSTEMS:    MEDICATIONS  (STANDING):  albuterol/ipratropium for Nebulization 3 milliLiter(s) Nebulizer every 6 hours  apixaban 5 milliGRAM(s) Oral every 12 hours  aspirin  chewable 81 milliGRAM(s) Oral daily  atorvastatin 80 milliGRAM(s) Oral at bedtime  chlorhexidine 2% Cloths 1 Application(s) Topical <User Schedule>  doxazosin 1 milliGRAM(s) Oral at bedtime  insulin glargine Injectable (LANTUS) 30 Unit(s) SubCutaneous every morning  insulin lispro (ADMELOG) corrective regimen sliding scale   SubCutaneous Before meals and at bedtime  levETIRAcetam 1000 milliGRAM(s) Oral two times a day  metoprolol tartrate 25 milliGRAM(s) Oral two times a day  nystatin Powder 1 Application(s) Topical two times a day  polyethylene glycol 3350 17 Gram(s) Oral daily  potassium phosphate / sodium phosphate Powder (PHOS-NaK) 1 Packet(s) Oral three times a day with meals  senna 2 Tablet(s) Oral at bedtime  valproic  acid Syrup 750 milliGRAM(s) Oral daily    MEDICATIONS  (PRN):  acetaminophen     Tablet .. 650 milliGRAM(s) Oral every 6 hours PRN Temp greater or equal to 38C (100.4F), Mild Pain (1 - 3)  albuterol    90 MICROgram(s) HFA Inhaler 2 Puff(s) Inhalation every 6 hours PRN Shortness of Breath and/or Wheezing  ondansetron Injectable 4 milliGRAM(s) IV Push every 8 hours PRN Nausea and/or Vomiting      CAPILLARY BLOOD GLUCOSE      POCT Blood Glucose.: 222 mg/dL (29 Apr 2024 11:21)  POCT Blood Glucose.: 114 mg/dL (29 Apr 2024 07:19)  POCT Blood Glucose.: 222 mg/dL (28 Apr 2024 21:12)  POCT Blood Glucose.: 185 mg/dL (28 Apr 2024 16:31)    I&O's Summary    28 Apr 2024 07:01  -  29 Apr 2024 07:00  --------------------------------------------------------  IN: 0 mL / OUT: 700 mL / NET: -700 mL        PHYSICAL EXAM:  Vital Signs Last 24 Hrs  T(C): 37 (29 Apr 2024 12:00), Max: 37.2 (29 Apr 2024 00:00)  T(F): 98.6 (29 Apr 2024 12:00), Max: 98.9 (29 Apr 2024 00:00)  HR: 98 (29 Apr 2024 12:00) (72 - 99)  BP: 129/85 (29 Apr 2024 12:00) (129/85 - 167/86)  BP(mean): 94 (29 Apr 2024 12:00) (84 - 117)  RR: 23 (29 Apr 2024 12:00) (20 - 27)  SpO2: 100% (29 Apr 2024 12:00) (95% - 100%)    Parameters below as of 29 Apr 2024 12:00  Patient On (Oxygen Delivery Method): room air        CONSTITUTIONAL: NAD  RESPIRATORY: Normal respiratory effort; lungs are clear to auscultation bilaterally  CARDIOVASCULAR: Regular rate and rhythm, normal S1 and S2, no murmur/rub/gallop; No lower extremity edema  ABDOMEN: Nontender to palpation, normoactive bowel sounds, no rebound/guarding  MUSCLOSKELETAL: no clubbing or cyanosis of digits; no joint swelling or tenderness to palpation  PSYCH: A+O to person  NEURO: dysarthria, able to understand some speech. Follows commands    LABS:                        10.9   8.52  )-----------( 264      ( 29 Apr 2024 01:55 )             34.6     04-29    142  |  106  |  15  ----------------------------<  95  3.8   |  31  |  0.60    Ca    8.5      29 Apr 2024 01:55  Phos  2.2     04-29  Mg     1.9     04-29    TPro  6.2  /  Alb  1.7<L>  /  TBili  0.3  /  DBili  x   /  AST  35  /  ALT  23  /  AlkPhos  59  04-29          Urinalysis Basic - ( 29 Apr 2024 01:55 )    Color: x / Appearance: x / SG: x / pH: x  Gluc: 95 mg/dL / Ketone: x  / Bili: x / Urobili: x   Blood: x / Protein: x / Nitrite: x   Leuk Esterase: x / RBC: x / WBC x   Sq Epi: x / Non Sq Epi: x / Bacteria: x
Patient is a 73y old  Male who presents with a chief complaint of Aspiration Pneumonia (30 Apr 2024 13:44)    INTERVAL HPI/OVERNIGHT EVENTS: STACIE, son at bedside     MEDICATIONS  (STANDING):  albuterol/ipratropium for Nebulization 3 milliLiter(s) Nebulizer every 6 hours  apixaban 5 milliGRAM(s) Oral every 12 hours  aspirin  chewable 81 milliGRAM(s) Oral daily  atorvastatin 80 milliGRAM(s) Oral at bedtime  chlorhexidine 2% Cloths 1 Application(s) Topical <User Schedule>  doxazosin 1 milliGRAM(s) Oral at bedtime  insulin lispro (ADMELOG) corrective regimen sliding scale   SubCutaneous Before meals and at bedtime  levETIRAcetam 1000 milliGRAM(s) Oral two times a day  metoprolol tartrate 25 milliGRAM(s) Oral two times a day  nystatin Powder 1 Application(s) Topical two times a day  polyethylene glycol 3350 17 Gram(s) Oral daily  potassium phosphate / sodium phosphate Powder (PHOS-NaK) 1 Packet(s) Oral three times a day with meals  primidone 100 milliGRAM(s) Oral three times a day  sacubitril 24 mG/valsartan 26 mG 1 Tablet(s) Oral two times a day  senna 2 Tablet(s) Oral at bedtime  valproic  acid Syrup 750 milliGRAM(s) Oral daily    MEDICATIONS  (PRN):  acetaminophen     Tablet .. 650 milliGRAM(s) Oral every 6 hours PRN Temp greater or equal to 38C (100.4F), Mild Pain (1 - 3)  albuterol    90 MICROgram(s) HFA Inhaler 2 Puff(s) Inhalation every 6 hours PRN Shortness of Breath and/or Wheezing    Allergies    No Known Allergies    Intolerances      REVIEW OF SYSTEMS:  All other systems reviewed and are negative    Vital Signs Last 24 Hrs  T(C): 36.8 (01 May 2024 10:33), Max: 37.1 (01 May 2024 05:13)  T(F): 98.3 (01 May 2024 10:33), Max: 98.7 (01 May 2024 05:13)  HR: 68 (01 May 2024 11:05) (67 - 93)  BP: 106/63 (01 May 2024 10:33) (106/63 - 158/71)  BP(mean): --  RR: 18 (01 May 2024 10:33) (18 - 20)  SpO2: 97% (01 May 2024 11:05) (96% - 98%)    Parameters below as of 01 May 2024 11:05  Patient On (Oxygen Delivery Method): room air      Daily     Daily   I&O's Summary    30 Apr 2024 07:01  -  01 May 2024 07:00  --------------------------------------------------------  IN: 0 mL / OUT: 225 mL / NET: -225 mL      CAPILLARY BLOOD GLUCOSE      POCT Blood Glucose.: 177 mg/dL (01 May 2024 10:53)  POCT Blood Glucose.: 166 mg/dL (01 May 2024 07:42)  POCT Blood Glucose.: 226 mg/dL (30 Apr 2024 21:37)  POCT Blood Glucose.: 241 mg/dL (30 Apr 2024 17:22)    PHYSICAL EXAM:  GEN: NAD, resting in bed   RESPIRATORY: Normal respiratory effort; lungs are clear to auscultation bilaterally  CARDIOVASCULAR: Regular rate and rhythm, normal S1 and S2, no murmur/rub/gallop; No lower extremity edema  ABDOMEN: Nontender to palpation, normoactive bowel sounds, no rebound/guarding  MUSCLOSKELETAL: no clubbing or cyanosis of digits; no joint swelling or tenderness to palpation  PSYCH: A+O to person  NEURO: dysarthria, able to understand some speech. Follows commands      Labs                                DVT prophylaxis: > Lovenox 40mg SQ daily  > Heparin   > SCD's
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 s/p trach/PEG on 12/5. Pt initially felt to have anoxic encephalopathy but MS improved. Eventually pt with removal of trach and PEG. Pt transferred fro [facility] on 4/14 2 to vomiting while on BiPAP; admitted with working dx of aspiration PNA. Today RRT called 2 to hypoxemia which improved, resp distress, lethargy and HTN. In the ICU pt intubated. ICU dx: Acute hypoxic and hypercapneic resp failure requiring intubation possibly 2 to aspiration. s/p extubation 4/23 and later in the evening was placed on NIV      ## Labs:  CBC:                        13.3   15.08 )-----------( 297      ( 24 Apr 2024 03:00 )             41.7     Chem:  04-24    144  |  105  |  27<H>  ----------------------------<  203<H>  3.5   |  32<H>  |  0.94    Ca    9.1      24 Apr 2024 03:00  Phos  2.7     04-24  Mg     2.4     04-24    TPro  6.9  /  Alb  2.0<L>  /  TBili  0.2  /  DBili  x   /  AST  35  /  ALT  25  /  AlkPhos  76  04-24    Coags:          ## Imaging:    ## Medications:    doxazosin 1 milliGRAM(s) Oral at bedtime  metoprolol tartrate 25 milliGRAM(s) Oral two times a day    albuterol    90 MICROgram(s) HFA Inhaler 2 Puff(s) Inhalation every 6 hours PRN  albuterol/ipratropium for Nebulization 3 milliLiter(s) Nebulizer every 6 hours    atorvastatin 80 milliGRAM(s) Oral at bedtime  insulin glargine Injectable (LANTUS) 30 Unit(s) SubCutaneous every morning  insulin lispro (ADMELOG) corrective regimen sliding scale   SubCutaneous every 6 hours  insulin lispro Injectable (ADMELOG) 8 Unit(s) SubCutaneous every 6 hours    apixaban 5 milliGRAM(s) Oral every 12 hours  aspirin  chewable 81 milliGRAM(s) Oral daily    polyethylene glycol 3350 17 Gram(s) Oral daily  senna 2 Tablet(s) Oral at bedtime    acetaminophen     Tablet .. 650 milliGRAM(s) Oral every 6 hours PRN  levETIRAcetam 1000 milliGRAM(s) Oral two times a day  ondansetron Injectable 4 milliGRAM(s) IV Push every 8 hours PRN  valproic  acid Syrup 750 milliGRAM(s) Oral daily      ## Vitals:  T(C): 37.5 (04-24-24 @ 14:00), Max: 38.6 (04-24-24 @ 04:00)  HR: 112 (04-24-24 @ 14:00) (65 - 119)  BP: 153/77 (04-24-24 @ 14:00) (138/90 - 204/92)  BP(mean): 91 (04-24-24 @ 14:00) (91 - 141)  RR: 20 (04-24-24 @ 14:00) (16 - 42)  SpO2: 100% (04-24-24 @ 14:00) (92% - 100%)  Wt(kg): --  Vent:   ABG: ABG - ( 23 Apr 2024 08:14 )  pH, Arterial: 7.55  pH, Blood: x     /  pCO2: 43    /  pO2: 78    / HCO3: 38    / Base Excess: 13.7  /  SaO2: 96.6                  04-23 @ 07:01  -  04-24 @ 07:00  --------------------------------------------------------  IN: 1464.5 mL / OUT: 400 mL / NET: 1064.5 mL    04-24 @ 07:01  - 04-24 @ 15:03  --------------------------------------------------------  IN: 210 mL / OUT: 0 mL / NET: 210 mL          ## P/E:  Gen: lying comfortably in bed in no apparent distress  Face: AVAPs mask  Lungs: B/l rhonchi  Heart: Tachy  Abd: Soft/+BS/ Non-tender  Ext: UE edema  Neuro: Awake, alert    CENTRAL LINE: [ ] YES [ ] NO  LOCATION:   DATE INSERTED:  REMOVE: [ ] YES [ ] NO      LUJAN: [ ] YES [ ] NO    DATE INSERTED:  REMOVE:  [ ] YES [ ] NO      A-LINE:  [ ] YES [ ] NO  LOCATION:   DATE INSERTED:  REMOVE:  [ ] YES [ ] NO  EXPLAIN:    CODE STATUS: [x ] full code  [ ] DNR  [ ] DNI  [ ] MOLST  Goals of care discussion: [ ] yes         
INTERVAL HPI/OVERNIGHT EVENTS:   HPI:  73-year-old male with a PMH of  DM, HTN, Seizure, TBI, COPD CHF cardiac arrest BPH Presents to the ED from [facility] for Dyspnea. At baseline, patient uses BiPap intermittently throughout the rohan, patient had an episode of vomiting while on the Bipap. Then continued to have SOB for about an hour, sent to the ED for further evaluation. Upon ED arrival patient was tachypneic with increase work of breathing, placed on Bipap. Upon evaluation, patient is AAOx3, No distress, able to communicate full sentence (at his own pace due to CVA residual). Labs remarkable for WBC:15.30, K:5.9, troponin 277, ECG-NSR no T-wave or ST changes, BP:146/83, HR: 83 Temp: 100.9. Received Zosyn, Tylenol, Kayexalate, Humulin 5U, Albuterol. (14 Apr 2024 07:04)      CENTRAL LINE: [ ] YES [ ] NO  LOCATION:   DATE INSERTED:  REMOVE: [ ] YES [ ] NO  EXPLAIN:    LUJAN: [ ] YES [ ] NO    DATE INSERTED:  REMOVE:  [ ] YES [ ] NO  EXPLAIN:    A-LINE:  [ ] YES [ ] NO  LOCATION:   DATE INSERTED:  REMOVE:  [ ] YES [ ] NO  EXPLAIN:    PAST MEDICAL & SURGICAL HISTORY:  HTN (hypertension)      HLD (hyperlipidemia)      Diabetes mellitus      Lacunar infarction      BPH (benign prostatic hyperplasia)      CHF (congestive heart failure)      Chronic obstructive pulmonary disease, unspecified COPD type      BPH without urinary obstruction      History of chronic ischemic heart disease      Cardiac pacemaker      Cardiac arrest      Seizures      TBI (traumatic brain injury)      S/P CABG (coronary artery bypass graft)  2014          REVIEW OF SYSTEMS:    Negative ROS aside from HPI/Interval events above.    ICU Vital Signs Last 24 Hrs  T(C): 37.5 (27 Apr 2024 11:00), Max: 37.9 (27 Apr 2024 03:00)  T(F): 99.5 (27 Apr 2024 11:00), Max: 100.2 (27 Apr 2024 03:00)  HR: 85 (27 Apr 2024 11:00) (83 - 102)  BP: 124/70 (27 Apr 2024 11:00) (124/70 - 169/79)  BP(mean): 88 (27 Apr 2024 11:00) (88 - 145)  ABP: --  ABP(mean): --  RR: 23 (27 Apr 2024 11:00) (16 - 29)  SpO2: 100% (27 Apr 2024 11:00) (97% - 100%)    O2 Parameters below as of 27 Apr 2024 08:00  Patient On (Oxygen Delivery Method): nasal cannula  O2 Flow (L/min): 2        I&O's Detail    26 Apr 2024 07:01  -  27 Apr 2024 07:00  --------------------------------------------------------  IN:    Enteral Tube Flush: 35 mL    Glucerna: 1190 mL  Total IN: 1225 mL    OUT:    Incontinent per Condom Catheter (mL): 600 mL  Total OUT: 600 mL    Total NET: 625 mL      27 Apr 2024 07:01  -  27 Apr 2024 12:28  --------------------------------------------------------  IN:  Total IN: 0 mL    OUT:    Glucerna: 0 mL    Incontinent per Condom Catheter (mL): 200 mL  Total OUT: 200 mL    Total NET: -200 mL        CAPILLARY BLOOD GLUCOSE      POCT Blood Glucose.: 144 mg/dL (27 Apr 2024 11:06)  POCT Blood Glucose.: 226 mg/dL (27 Apr 2024 05:59)  POCT Blood Glucose.: 191 mg/dL (26 Apr 2024 23:28)  POCT Blood Glucose.: 88 mg/dL (26 Apr 2024 16:53)        PHYSICAL EXAM:    Gen: lying comfortably in bed in no apparent distress  Face: LFNC  Lungs: B/l rhonchi  Heart: Paced  Abd: Soft/+BS/ Non-tender  Ext: Edema  Neuro: Awake, alert, interactive. responds to voice.      LABS:                        11.6   10.47 )-----------( 337      ( 27 Apr 2024 03:00 )             35.9      04-27    144  |  105  |  21  ----------------------------<  210<H>  4.0   |  36<H>  |  0.68    Ca    9.1      27 Apr 2024 03:00  Phos  2.7     04-27  Mg     2.3     04-27    TPro  6.4  /  Alb  1.8<L>  /  TBili  0.3  /  DBili  x   /  AST  28  /  ALT  28  /  AlkPhos  74  04-27      Urinalysis Basic - ( 27 Apr 2024 03:00 )    Color: x / Appearance: x / SG: x / pH: x  Gluc: 210 mg/dL / Ketone: x  / Bili: x / Urobili: x   Blood: x / Protein: x / Nitrite: x   Leuk Esterase: x / RBC: x / WBC x   Sq Epi: x / Non Sq Epi: x / Bacteria: x
INTERVAL HPI/OVERNIGHT EVENTS:   HPI:  73-year-old male with a PMH of  DM, HTN, Seizure, TBI, COPD CHF cardiac arrest BPH Presents to the ED from [facility] for Dyspnea. At baseline, patient uses BiPap intermittently throughout the rohan, patient had an episode of vomiting while on the Bipap. Then continued to have SOB for about an hour, sent to the ED for further evaluation. Upon ED arrival patient was tachypneic with increase work of breathing, placed on Bipap. Upon evaluation, patient is AAOx3, No distress, able to communicate full sentence (at his own pace due to CVA residual). Labs remarkable for WBC:15.30, K:5.9, troponin 277, ECG-NSR no T-wave or ST changes, BP:146/83, HR: 83 Temp: 100.9. Received Zosyn, Tylenol, Kayexalate, Humulin 5U, Albuterol. (14 Apr 2024 07:04)      CENTRAL LINE: [ ] YES [ ] NO  LOCATION:   DATE INSERTED:  REMOVE: [ ] YES [ ] NO  EXPLAIN:    LUJAN: [ ] YES [ ] NO    DATE INSERTED:  REMOVE:  [ ] YES [ ] NO  EXPLAIN:    A-LINE:  [ ] YES [ ] NO  LOCATION:   DATE INSERTED:  REMOVE:  [ ] YES [ ] NO  EXPLAIN:    PAST MEDICAL & SURGICAL HISTORY:  HTN (hypertension)      HLD (hyperlipidemia)      Diabetes mellitus      Lacunar infarction      BPH (benign prostatic hyperplasia)      CHF (congestive heart failure)      Chronic obstructive pulmonary disease, unspecified COPD type      BPH without urinary obstruction      History of chronic ischemic heart disease      Cardiac pacemaker      Cardiac arrest      Seizures      TBI (traumatic brain injury)      S/P CABG (coronary artery bypass graft)  2014          REVIEW OF SYSTEMS:    Negative ROS aside from HPI/Interval events above.    ICU Vital Signs Last 24 Hrs  T(C): 36.6 (28 Apr 2024 11:00), Max: 37.4 (27 Apr 2024 11:53)  T(F): 97.9 (28 Apr 2024 11:00), Max: 99.3 (27 Apr 2024 11:53)  HR: 87 (28 Apr 2024 11:00) (72 - 108)  BP: 139/64 (28 Apr 2024 11:00) (115/53 - 165/66)  BP(mean): 88 (28 Apr 2024 11:00) (65 - 116)  ABP: --  ABP(mean): --  RR: 19 (28 Apr 2024 11:00) (14 - 28)  SpO2: 98% (28 Apr 2024 11:00) (96% - 100%)    O2 Parameters below as of 28 Apr 2024 00:05  Patient On (Oxygen Delivery Method): room air      I&O's Detail    27 Apr 2024 07:01  -  28 Apr 2024 07:00  --------------------------------------------------------  IN:  Total IN: 0 mL    OUT:    Glucerna: 0 mL    Incontinent per Condom Catheter (mL): 850 mL  Total OUT: 850 mL    Total NET: -850 mL    CAPILLARY BLOOD GLUCOSE      POCT Blood Glucose.: 249 mg/dL (28 Apr 2024 11:07)  POCT Blood Glucose.: 114 mg/dL (28 Apr 2024 07:40)  POCT Blood Glucose.: 154 mg/dL (27 Apr 2024 22:49)  POCT Blood Glucose.: 141 mg/dL (27 Apr 2024 16:31)      PHYSICAL EXAM:    Gen: lying comfortably in bed in no apparent distress  Face: LFNC  Lungs: no distress. on LFNC.   Heart: Paced  Abd: Soft/+BS/ Non-tender  Ext: Edema  Neuro: Awake, alert, interactive. responds to voice.      LABS:                        12.5   9.35  )-----------( 362      ( 28 Apr 2024 02:26 )             38.8      04-28    145  |  107  |  16  ----------------------------<  123<H>  3.8   |  35<H>  |  0.65    Ca    9.3      28 Apr 2024 02:26  Phos  2.6     04-28  Mg     2.2     04-28    TPro  6.6  /  Alb  1.9<L>  /  TBili  0.3  /  DBili  x   /  AST  22  /  ALT  24  /  AlkPhos  67  04-28      Urinalysis Basic - ( 28 Apr 2024 02:26 )    Color: x / Appearance: x / SG: x / pH: x  Gluc: 123 mg/dL / Ketone: x  / Bili: x / Urobili: x   Blood: x / Protein: x / Nitrite: x   Leuk Esterase: x / RBC: x / WBC x   Sq Epi: x / Non Sq Epi: x / Bacteria: x
Patient is a 73y old  Male who presents with a chief complaint of Aspiration Pneumonia (14 Apr 2024 07:04)      INTERVAL HPI/OVERNIGHT EVENTS: No acute events overnight, afebrile.     MEDICATIONS  (STANDING):  apixaban 5 milliGRAM(s) Oral every 12 hours  aspirin enteric coated 81 milliGRAM(s) Oral daily  atorvastatin 80 milliGRAM(s) Oral at bedtime  budesonide 160 MICROgram(s)/formoterol 4.5 MICROgram(s) Inhaler 2 Puff(s) Inhalation two times a day  dextrose 10% Bolus 125 milliLiter(s) IV Bolus once  dextrose 5%. 1000 milliLiter(s) (50 mL/Hr) IV Continuous <Continuous>  dextrose 50% Injectable 25 Gram(s) IV Push once  glucagon  Injectable 1 milliGRAM(s) IntraMuscular once  insulin lispro (ADMELOG) corrective regimen sliding scale   SubCutaneous three times a day before meals  levETIRAcetam 1000 milliGRAM(s) Oral two times a day  metoprolol tartrate 25 milliGRAM(s) Oral two times a day  piperacillin/tazobactam IVPB.. 3.375 Gram(s) IV Intermittent every 8 hours  sodium chloride 0.9%. 1000 milliLiter(s) (80 mL/Hr) IV Continuous <Continuous>  tamsulosin 0.4 milliGRAM(s) Oral at bedtime  valproic acid 750 milliGRAM(s) Oral daily    MEDICATIONS  (PRN):  acetaminophen     Tablet .. 650 milliGRAM(s) Oral every 6 hours PRN Temp greater or equal to 38C (100.4F), Mild Pain (1 - 3)  albuterol    90 MICROgram(s) HFA Inhaler 2 Puff(s) Inhalation every 6 hours PRN Shortness of Breath and/or Wheezing  aluminum hydroxide/magnesium hydroxide/simethicone Suspension 30 milliLiter(s) Oral every 4 hours PRN Dyspepsia  dextrose Oral Gel 15 Gram(s) Oral once PRN Blood Glucose LESS THAN 70 milliGRAM(s)/deciliter  melatonin 3 milliGRAM(s) Oral at bedtime PRN Insomnia  ondansetron Injectable 4 milliGRAM(s) IV Push every 8 hours PRN Nausea and/or Vomiting      Allergies    No Known Allergies    Intolerances        REVIEW OF SYSTEMS:  CONSTITUTIONAL: +ve for fatigue  EYES: No eye pain, visual disturbances, or discharge  ENMT:  No difficulty hearing, tinnitus, vertigo; No sinus or throat pain  NECK: No pain or stiffness      Vital Signs Last 24 Hrs  T(C): 36.4 (15 Apr 2024 10:58), Max: 37.2 (14 Apr 2024 16:35)  T(F): 97.6 (15 Apr 2024 10:58), Max: 99 (14 Apr 2024 16:35)  HR: 69 (15 Apr 2024 10:58) (60 - 78)  BP: 127/69 (15 Apr 2024 10:58) (120/72 - 127/85)  BP(mean): 99 (15 Apr 2024 04:34) (88 - 99)  RR: 18 (15 Apr 2024 10:58) (16 - 18)  SpO2: 97% (15 Apr 2024 10:58) (94% - 100%)    Parameters below as of 15 Apr 2024 07:00  Patient On (Oxygen Delivery Method): room air        PHYSICAL EXAM:    HEAD:  Atraumatic, Normocephalic  EYES: EOMI, PERRLA, conjunctiva and sclera clear  NECK: Supple, No JVD, Normal thyroid      LABS:                        12.8   10.96 )-----------( 171      ( 15 Apr 2024 05:25 )             39.9     04-15    142  |  106  |  38<H>  ----------------------------<  230<H>  3.7   |  27  |  1.13    Ca    8.2<L>      15 Apr 2024 05:25    TPro  6.5  /  Alb  2.4<L>  /  TBili  0.6  /  DBili  x   /  AST  17  /  ALT  17  /  AlkPhos  56  04-14      Urinalysis Basic - ( 15 Apr 2024 05:25 )    Color: x / Appearance: x / SG: x / pH: x  Gluc: 230 mg/dL / Ketone: x  / Bili: x / Urobili: x   Blood: x / Protein: x / Nitrite: x   Leuk Esterase: x / RBC: x / WBC x   Sq Epi: x / Non Sq Epi: x / Bacteria: x      CAPILLARY BLOOD GLUCOSE      POCT Blood Glucose.: 271 mg/dL (15 Apr 2024 11:16)  POCT Blood Glucose.: 200 mg/dL (15 Apr 2024 08:02)  POCT Blood Glucose.: 275 mg/dL (14 Apr 2024 21:30)  POCT Blood Glucose.: 293 mg/dL (14 Apr 2024 18:32)              
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 s/p trach/PEG on . Pt initially felt to have anoxic encephalopathy but MS improved. Eventually pt with removal of trach and PEG. Pt transferred fro [facility] on  2 to vomiting while on BiPAP; admitted with working dx of aspiration PNA. Today RRT called 2 to hypoxemia which improved, resp distress, lethargy and HTN. In the ICU pt intubated. ICU dx: Acute hypoxic and hypercapneic resp failure requiring intubation possibly 2 to aspiration. s/p extubation  and later in the evening was placed on NIV. Currently off NIV      ## Labs:  CBC:                        12.6   12.43 )-----------( 348      ( 2024 05:00 )             41.2     Chem:      143  |  105  |  25<H>  ----------------------------<  174<H>  4.1   |  36<H>  |  0.81    Ca    9.3      2024 05:00  Phos  2.6       Mg     2.6         TPro  7.2  /  Alb  2.0<L>  /  TBili  0.3  /  DBili  x   /  AST  37  /  ALT  35  /  AlkPhos  85      Coags:          ## Imaging:    ## Medications:    doxazosin 1 milliGRAM(s) Oral at bedtime  metoprolol tartrate 25 milliGRAM(s) Oral two times a day    albuterol    90 MICROgram(s) HFA Inhaler 2 Puff(s) Inhalation every 6 hours PRN  albuterol/ipratropium for Nebulization 3 milliLiter(s) Nebulizer every 6 hours    atorvastatin 80 milliGRAM(s) Oral at bedtime  insulin glargine Injectable (LANTUS) 30 Unit(s) SubCutaneous every morning  insulin lispro (ADMELOG) corrective regimen sliding scale   SubCutaneous every 6 hours  insulin lispro Injectable (ADMELOG) 10 Unit(s) SubCutaneous every 6 hours    apixaban 5 milliGRAM(s) Oral every 12 hours  aspirin  chewable 81 milliGRAM(s) Oral daily    polyethylene glycol 3350 17 Gram(s) Oral daily  senna 2 Tablet(s) Oral at bedtime    acetaminophen     Tablet .. 650 milliGRAM(s) Oral every 6 hours PRN  levETIRAcetam 1000 milliGRAM(s) Oral two times a day  ondansetron Injectable 4 milliGRAM(s) IV Push every 8 hours PRN  valproic  acid Syrup 750 milliGRAM(s) Oral daily      ## Vitals:  T(C): 37.5 (24 @ 15:00), Max: 38 (24 @ 05:20)  HR: 91 (24 @ 15:00) (81 - 109)  BP: 156/82 (24 @ 15:00) (117/107 - 169/79)  BP(mean): 104 (24 @ 15:00) (82 - 137)  RR: 26 (24 @ 15:00) (15 - 32)  SpO2: 100% (24 @ 15:00) (92% - 100%)  Wt(kg): --  Vent:   AB-25 @ 07:  -   @ 07:00  --------------------------------------------------------  IN: 1385.6 mL / OUT: 350 mL / NET: 1035.6 mL     @ 07:  -   @ 15:10  --------------------------------------------------------  IN: 280 mL / OUT: 0 mL / NET: 280 mL          ## P/E:  Gen: lying comfortably in bed in no apparent distress  Face: (+) NGT/ (+) nc O2  Lungs: B/l rhonchi and coarse BS  Heart: Occas irreg  Abd: Soft/+BS/ Non-tender  Ext: Edema  Neuro: Awake, alert, interactive    CENTRAL LINE: [ ] YES [ ] NO  LOCATION:   DATE INSERTED:  REMOVE: [ ] YES [ ] NO      LUJAN: [ ] YES [ ] NO    DATE INSERTED:  REMOVE:  [ ] YES [ ] NO      A-LINE:  [ ] YES [ ] NO  LOCATION:   DATE INSERTED:  REMOVE:  [ ] YES [ ] NO  EXPLAIN:    CODE STATUS: [x ] full code  [ ] DNR  [ ] DNI  [ ] MOLST  Goals of care discussion: [ ] yes   
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 s/p trach/PEG on 12/5. Pt initially felt to have anoxic encephalopathy but MS improved. Eventually pt with removal of trach and PEG. Pt transferred fro [facility] on 4/14 2 to vomiting while on BiPAP; admitted with working dx of aspiration PNA. Today RRT called 2 to hypoxemia which improved, resp distress, lethargy and HTN. In the ICU pt intubated. ICU dx: Acute hypoxic and hypercapneic resp failure requiring intubation possibly 2 to aspiration      ## Labs:  CBC:                        12.5   10.68 )-----------( 264      ( 23 Apr 2024 03:00 )             38.5     Chem:  04-23    145  |  107  |  23  ----------------------------<  196<H>  4.1   |  28  |  0.83    Ca    8.6      23 Apr 2024 03:00  Phos  2.5     04-23  Mg     2.2     04-23    TPro  6.1  /  Alb  1.7<L>  /  TBili  0.2  /  DBili  x   /  AST  28  /  ALT  22  /  AlkPhos  76  04-23    Coags:          ## Imaging:    ## Medications:    doxazosin 1 milliGRAM(s) Oral at bedtime  metoprolol tartrate 25 milliGRAM(s) Oral two times a day    albuterol    90 MICROgram(s) HFA Inhaler 2 Puff(s) Inhalation every 6 hours PRN  albuterol/ipratropium for Nebulization 3 milliLiter(s) Nebulizer every 6 hours    atorvastatin 80 milliGRAM(s) Oral at bedtime  insulin glargine Injectable (LANTUS) 30 Unit(s) SubCutaneous every morning  insulin lispro (ADMELOG) corrective regimen sliding scale   SubCutaneous every 6 hours  insulin lispro Injectable (ADMELOG) 8 Unit(s) SubCutaneous every 6 hours    apixaban 5 milliGRAM(s) Oral every 12 hours  aspirin  chewable 81 milliGRAM(s) Oral daily    polyethylene glycol 3350 17 Gram(s) Oral daily  senna 2 Tablet(s) Oral at bedtime    acetaminophen     Tablet .. 650 milliGRAM(s) Oral every 6 hours PRN  levETIRAcetam 1000 milliGRAM(s) Oral two times a day  ondansetron Injectable 4 milliGRAM(s) IV Push every 8 hours PRN  valproic  acid Syrup 750 milliGRAM(s) Oral daily      ## Vitals:  T(C): 38 (04-23-24 @ 13:00), Max: 38 (04-23-24 @ 10:30)  HR: 92 (04-23-24 @ 13:00) (57 - 92)  BP: 137/59 (04-23-24 @ 13:00) (94/52 - 182/86)  BP(mean): 83 (04-23-24 @ 13:00) (65 - 112)  RR: 22 (04-23-24 @ 13:00) (18 - 34)  SpO2: 93% (04-23-24 @ 13:00) (93% - 100%)  Wt(kg): --  Vent: Mode: CPAP with PS, RR (patient): 19, FiO2: 25, PEEP: 5, PS: 5  ABG: ABG - ( 23 Apr 2024 08:14 )  pH, Arterial: 7.55  pH, Blood: x     /  pCO2: 43    /  pO2: 78    / HCO3: 38    / Base Excess: 13.7  /  SaO2: 96.6                  04-22 @ 07:01  -  04-23 @ 07:00  --------------------------------------------------------  IN: 1459.8 mL / OUT: 1100 mL / NET: 359.8 mL    04-23 @ 07:01  -  04-23 @ 15:32  --------------------------------------------------------  IN: 234.5 mL / OUT: 0 mL / NET: 234.5 mL          ## P/E:  Gen: lying comfortably in bed in no apparent distress  Nose: (+) NGT  Mouth: (+) ETT  Lungs: B/l rhonchi  Heart: Paced and occas irreg  Abd: Soft/+BS/ Non-tender  Ext: Edema  Neuro: Awake, alert    CENTRAL LINE: [ ] YES [ ] NO  LOCATION:   DATE INSERTED:  REMOVE: [ ] YES [ ] NO      LUJAN: [ ] YES [ ] NO    DATE INSERTED:  REMOVE:  [ ] YES [ ] NO      A-LINE:  [ ] YES [ ] NO  LOCATION:   DATE INSERTED:  REMOVE:  [ ] YES [ ] NO  EXPLAIN:    CODE STATUS: [x ] full code  [ ] DNR  [ ] DNI  [ ] MOLST  Goals of care discussion: [ ] yes   
INTERVAL HPI/OVERNIGHT EVENTS:   HPI:  73-year-old male with a PMH of  DM, HTN, Seizure, TBI, COPD CHF cardiac arrest BPH Presents to the ED from [facility] for Dyspnea. At baseline, patient uses BiPap intermittently throughout the rohan, patient had an episode of vomiting while on the Bipap. Then continued to have SOB for about an hour, sent to the ED for further evaluation. Upon ED arrival patient was tachypneic with increase work of breathing, placed on Bipap. Upon evaluation, patient is AAOx3, No distress, able to communicate full sentence (at his own pace due to CVA residual). Labs remarkable for WBC:15.30, K:5.9, troponin 277, ECG-NSR no T-wave or ST changes, BP:146/83, HR: 83 Temp: 100.9. Received Zosyn, Tylenol, Kayexalate, Humulin 5U, Albuterol. (14 Apr 2024 07:04)      CENTRAL LINE: [ ] YES [ ] NO  LOCATION:   DATE INSERTED:  REMOVE: [ ] YES [ ] NO  EXPLAIN:    LUJAN: [ ] YES [ ] NO    DATE INSERTED:  REMOVE:  [ ] YES [ ] NO  EXPLAIN:    A-LINE:  [ ] YES [ ] NO  LOCATION:   DATE INSERTED:  REMOVE:  [ ] YES [ ] NO  EXPLAIN:    PAST MEDICAL & SURGICAL HISTORY:  HTN (hypertension)      HLD (hyperlipidemia)      Diabetes mellitus      Lacunar infarction      BPH (benign prostatic hyperplasia)      CHF (congestive heart failure)      Chronic obstructive pulmonary disease, unspecified COPD type      BPH without urinary obstruction      History of chronic ischemic heart disease      Cardiac pacemaker      Cardiac arrest      Seizures      TBI (traumatic brain injury)      S/P CABG (coronary artery bypass graft)  2014          REVIEW OF SYSTEMS:    Negative ROS aside from HPI/Interval events above.    ICU Vital Signs Last 24 Hrs  T(C): 37.4 (18 Apr 2024 11:00), Max: 38 (18 Apr 2024 05:00)  T(F): 99.3 (18 Apr 2024 11:00), Max: 100.4 (18 Apr 2024 05:00)  HR: 75 (18 Apr 2024 11:39) (55 - 86)  BP: 154/76 (18 Apr 2024 11:00) (88/51 - 179/81)  BP(mean): 98 (18 Apr 2024 11:00) (64 - 121)  ABP: --  ABP(mean): --  RR: 16 (18 Apr 2024 11:00) (16 - 28)  SpO2: 95% (18 Apr 2024 11:39) (92% - 100%)    O2 Parameters below as of 18 Apr 2024 11:39  Patient On (Oxygen Delivery Method): ventilator            ABG - ( 17 Apr 2024 21:39 )  pH, Arterial: 7.44  pH, Blood: x     /  pCO2: 41    /  pO2: 91    / HCO3: 28    / Base Excess: 3.3   /  SaO2: 96.0                I&O's Detail    17 Apr 2024 07:01  -  18 Apr 2024 07:00  --------------------------------------------------------  IN:    Enteral Tube Flush: 60 mL    FentaNYL: 108 mL    Glucerna: 440 mL    IV PiggyBack: 125 mL    IV PiggyBack: 200 mL    Propofol: 326.6 mL  Total IN: 1259.6 mL    OUT:    Indwelling Catheter - Urethral (mL): 815 mL    Norepinephrine: 0 mL  Total OUT: 815 mL    Total NET: 444.6 mL          Mode: CPAP with PS  FiO2: 25  PEEP: 5  PS: 8  MAP: 8    CAPILLARY BLOOD GLUCOSE      POCT Blood Glucose.: 209 mg/dL (18 Apr 2024 11:31)  POCT Blood Glucose.: 224 mg/dL (18 Apr 2024 07:28)  POCT Blood Glucose.: 220 mg/dL (18 Apr 2024 05:14)  POCT Blood Glucose.: 168 mg/dL (17 Apr 2024 23:20)  POCT Blood Glucose.: 160 mg/dL (17 Apr 2024 21:28)  POCT Blood Glucose.: 183 mg/dL (17 Apr 2024 17:04)      PHYSICAL EXAM:    GENERAL: NAD, lying in bed intubated/sedated  HEAD:  Atraumatic, normocephalic  EYES: conjunctiva and sclera clear  NECK: Supple, trachea midline, no JVD  HEART: Regular rate and rhythm, no murmurs, rubs, or gallops  LUNGS: intubated, mechanically breath sounds bilaterally, b/l rhonchi.   ABDOMEN: Soft, nontender, nondistended,   EXTREMITIES: No clubbing, cyanosis. Mild LE edema b/l.   NERVOUS SYSTEM:   sedated, awakens to voice. tracks.      LABS:                        12.2   8.87  )-----------( 190      ( 18 Apr 2024 02:30 )             38.0      04-18    143  |  111<H>  |  21  ----------------------------<  191<H>  3.2<L>   |  25  |  0.92    Ca    8.4<L>      18 Apr 2024 02:30  Phos  2.8     04-18  Mg     2.3     04-18    TPro  5.8<L>  /  Alb  1.8<L>  /  TBili  0.3  /  DBili  x   /  AST  18  /  ALT  16  /  AlkPhos  52  04-18    PT/INR - ( 17 Apr 2024 02:40 )   PT: 10.0 sec;   INR: 0.84 ratio         PTT - ( 17 Apr 2024 02:40 )  PTT:27.9 sec  Urinalysis Basic - ( 18 Apr 2024 02:30 )    Color: x / Appearance: x / SG: x / pH: x  Gluc: 191 mg/dL / Ketone: x  / Bili: x / Urobili: x   Blood: x / Protein: x / Nitrite: x   Leuk Esterase: x / RBC: x / WBC x   Sq Epi: x / Non Sq Epi: x / Bacteria: x      Culture Results:   No growth (04-16 @ 18:30)
Patient is a 73y old  Male who presents with a chief complaint of Aspiration Pneumonia (16 Apr 2024 14:52)      BRIEF HOSPITAL COURSE:   74 yo m pmhx Dm2, HTN, seizure, TBI, COPD, CHF, cardiac arrest, BPH admitted from [facility] with aspiration with rrt called 4/16 for acute on chronic aspiration/hypoxic resp failure requiring intubation.      Events last 24 hours:   Intubated, weaning settings as tolerated. sedated on fent and propofol. brief pressors toyin intubation, close HD monitoring.      PAST MEDICAL & SURGICAL HISTORY:  HTN (hypertension)  HLD (hyperlipidemia)  Diabetes mellitus  Lacunar infarction  BPH (benign prostatic hyperplasia)  CHF (congestive heart failure)  Chronic obstructive pulmonary disease, unspecified COPD type  BPH without urinary obstruction  History of chronic ischemic heart disease  Cardiac pacemaker  Cardiac arrest  Seizures  TBI (traumatic brain injury)  S/P CABG (coronary artery bypass graft)  2014      Allergies  No Known Allergies      FAMILY HISTORY:  No pertinent family history in first degree relatives      Social History:   from [facility]      Review of Systems:  unable to obtain 2/2 intubated/sedated       Physical Examination:    General: elderly male, lying in bed, nad    HEENT: nc/at, ett in place    PULM: coarse b/l    CVS: rrr    ABD: Soft, nondistended, nontender, +bs    EXT: + edema, nontender    SKIN: Warm    NEURO: sedated      Medications:  piperacillin/tazobactam IVPB.. 3.375 Gram(s) IV Intermittent every 8 hours  metoprolol tartrate 25 milliGRAM(s) Oral two times a day  norepinephrine Infusion 0.05 MICROgram(s)/kG/Min IV Continuous <Continuous>  albuterol    90 MICROgram(s) HFA Inhaler 2 Puff(s) Inhalation every 6 hours PRN  albuterol/ipratropium for Nebulization 3 milliLiter(s) Nebulizer every 6 hours  acetaminophen     Tablet .. 650 milliGRAM(s) Oral every 6 hours PRN  fentaNYL   Infusion. 0.5 MICROgram(s)/kG/Hr IV Continuous <Continuous>  levETIRAcetam 1000 milliGRAM(s) Oral two times a day  ondansetron Injectable 4 milliGRAM(s) IV Push every 8 hours PRN  propofol Infusion 10 MICROgram(s)/kG/Min IV Continuous <Continuous>  valproic acid 750 milliGRAM(s) Oral daily  apixaban 5 milliGRAM(s) Oral every 12 hours  aspirin enteric coated 81 milliGRAM(s) Oral daily  tamsulosin 0.4 milliGRAM(s) Oral at bedtime  atorvastatin 80 milliGRAM(s) Oral at bedtime  insulin lispro (ADMELOG) corrective regimen sliding scale   SubCutaneous every 6 hours  chlorhexidine 0.12% Liquid 15 milliLiter(s) Oral Mucosa every 12 hours  chlorhexidine 2% Cloths 1 Application(s) Topical <User Schedule>      Mode: AC/ CMV (Assist Control/ Continuous Mandatory Ventilation)  RR (machine): 20  TV (machine): 450  FiO2: 30  PEEP: 5  ITime: 1  MAP: 10  PIP: 24      ICU Vital Signs Last 24 Hrs  T(C): 37.2 (16 Apr 2024 21:45), Max: 38.1 (16 Apr 2024 14:45)  T(F): 99 (16 Apr 2024 21:45), Max: 100.6 (16 Apr 2024 14:45)  HR: 62 (16 Apr 2024 21:45) (59 - 143)  BP: 150/68 (16 Apr 2024 21:30) (73/50 - 210/141)  BP(mean): 91 (16 Apr 2024 21:30) (56 - 156)  ABP: --  ABP(mean): --  RR: 20 (16 Apr 2024 21:45) (16 - 33)  SpO2: 100% (16 Apr 2024 21:45) (92% - 100%)    O2 Parameters below as of 16 Apr 2024 19:15  Patient On (Oxygen Delivery Method): ventilator, 450/20/5    O2 Concentration (%): 30      Vital Signs Last 24 Hrs  T(C): 37.2 (16 Apr 2024 21:45), Max: 38.1 (16 Apr 2024 14:45)  T(F): 99 (16 Apr 2024 21:45), Max: 100.6 (16 Apr 2024 14:45)  HR: 62 (16 Apr 2024 21:45) (59 - 143)  BP: 150/68 (16 Apr 2024 21:30) (73/50 - 210/141)  BP(mean): 91 (16 Apr 2024 21:30) (56 - 156)  RR: 20 (16 Apr 2024 21:45) (16 - 33)  SpO2: 100% (16 Apr 2024 21:45) (92% - 100%)    Parameters below as of 16 Apr 2024 19:15  Patient On (Oxygen Delivery Method): ventilator, 450/20/5    O2 Concentration (%): 30    ABG - ( 16 Apr 2024 16:43 )  pH, Arterial: 7.29  pH, Blood: x     /  pCO2: 55    /  pO2: 96    / HCO3: 26    / Base Excess: -1.1  /  SaO2: 96.6        I&O's Detail    16 Apr 2024 07:01  -  16 Apr 2024 22:19  --------------------------------------------------------  IN:    FentaNYL: 20.2 mL    Lactated Ringers Bolus: 500 mL    Propofol: 98.2 mL  Total IN: 618.4 mL    OUT:    Norepinephrine: 0 mL  Total OUT: 0 mL  Total NET: 618.4 mL      LABS:                        13.3   11.64 )-----------( 181      ( 16 Apr 2024 16:20 )             42.1     04-16    142  |  107  |  26<H>  ----------------------------<  282<H>  3.8   |  27  |  0.96    Ca    8.4<L>      16 Apr 2024 16:20    TPro  6.3  /  Alb  2.1<L>  /  TBili  0.3  /  DBili  x   /  AST  23  /  ALT  21  /  AlkPhos  66  04-16      CAPILLARY BLOOD GLUCOSE  POCT Blood Glucose.: 237 mg/dL (16 Apr 2024 19:16)      Urinalysis Basic - ( 16 Apr 2024 16:20 )  Color: x / Appearance: x / SG: x / pH: x  Gluc: 282 mg/dL / Ketone: x  / Bili: x / Urobili: x   Blood: x / Protein: x / Nitrite: x   Leuk Esterase: x / RBC: x / WBC x   Sq Epi: x / Non Sq Epi: x / Bacteria: x      CULTURES:  pending      RADIOLOGY:   < from: Xray Chest 1 View- PORTABLE-Urgent (Xray Chest 1 View- PORTABLE-Urgent .) (04.16.24 @ 15:32) >    ACC: [id number] EXAM:  XR CHEST PORTABLE URGENT 1V   ORDERED BY: Evelyn Lopez     PROCEDURE DATE:  04/16/2024      INTERPRETATION:  AP chest on April 16, 2024 at 2:37 PM. Patient was   intubated.    Heart magnified by technique. Sternotomy and left-sided pacer again   noted. Mild right thoracic curve again seen.    There is an increasing lingular and left base infiltrate compared to   April 14.    Slight infiltrate lateral to the right hilum is also new.    Endotracheal tube and a nasogastric tube is been inserted.    IMPRESSION: Increasing small bilateral infiltrates. Endotracheal tube and   nasogastric tube inserted.    --- End of Report ---    Shade Escobar MD  This document has been electronically signed. Apr 16 2024  5:12PM    < end of copied text >  
CC: Patient is a 73y old  Male who presents with a chief complaint of Aspiration Pneumonia (19 Apr 2024 12:30)      ## HPI:HPI:  73-year-old male with a PMH of  DM, HTN, Seizure, TBI, COPD CHF cardiac arrest BPH Presents to the ED from [facility] for Dyspnea. At baseline, patient uses BiPap intermittently throughout the rohan, patient had an episode of vomiting while on the Bipap. Then continued to have SOB for about an hour, sent to the ED for further evaluation. Upon ED arrival patient was tachypneic with increase work of breathing, placed on Bipap. Upon evaluation, patient is AAOx3, No distress, able to communicate full sentence (at his own pace due to CVA residual). Labs remarkable for WBC:15.30, K:5.9, troponin 277, ECG-NSR no T-wave or ST changes, BP:146/83, HR: 83 Temp: 100.9. Received Zosyn, Tylenol, Kayexalate, Humulin 5U, Albuterol. (14 Apr 2024 07:04)      O/N: positive for coronavirus    ## ROS:  unable to assess ros due to mental status   ## EXAM  ICU Vital Signs Last 24 Hrs  T(C): 38.1 (20 Apr 2024 11:30), Max: 38.7 (19 Apr 2024 17:00)  T(F): 100.6 (20 Apr 2024 11:30), Max: 101.7 (19 Apr 2024 17:00)  HR: 74 (20 Apr 2024 11:30) (64 - 76)  BP: 141/71 (20 Apr 2024 11:30) (116/60 - 189/78)  BP(mean): 92 (20 Apr 2024 11:30) (76 - 113)  ABP: --  ABP(mean): --  RR: 35 (20 Apr 2024 11:30) (18 - 35)  SpO2: 98% (20 Apr 2024 11:30) (97% - 100%)    O2 Parameters below as of 20 Apr 2024 05:51  Patient On (Oxygen Delivery Method): ventilator          CON : NAD  EENT : EOMI, MMM  NECK : Full ROM  RESP : CTAB no increased WOB  CARD : rrr no m/r/g  ABD : S NT ND NABS no rebound  EXT : No edema  NEURO : Awake alert  ## Labs:  Lab Results:  CBC  CBC Full  -  ( 20 Apr 2024 02:29 )  WBC Count : 9.47 K/uL  RBC Count : 4.11 M/uL  Hemoglobin : 12.7 g/dL  Hematocrit : 38.6 %  Platelet Count - Automated : 218 K/uL  Mean Cell Volume : 93.9 fl  Mean Cell Hemoglobin : 30.9 pg  Mean Cell Hemoglobin Concentration : 32.9 g/dL  Auto Neutrophil # : x  Auto Lymphocyte # : x  Auto Monocyte # : x  Auto Eosinophil # : x  Auto Basophil # : x  Auto Neutrophil % : x  Auto Lymphocyte % : x  Auto Monocyte % : x  Auto Eosinophil % : x  Auto Basophil % : x    .		Differential:	[] Automated		[] Manual  Chemistry                        12.7   9.47  )-----------( 218      ( 20 Apr 2024 02:29 )             38.6     04-20    141  |  107  |  19  ----------------------------<  312<H>  4.1   |  31  |  1.00    Ca    8.7      20 Apr 2024 02:29  Phos  3.1     04-20  Mg     2.2     04-20    TPro  6.3  /  Alb  1.8<L>  /  TBili  0.3  /  DBili  x   /  AST  17  /  ALT  19  /  AlkPhos  65  04-19    LIVER FUNCTIONS - ( 19 Apr 2024 03:15 )  Alb: 1.8 g/dL / Pro: 6.3 gm/dL / ALK PHOS: 65 U/L / ALT: 19 U/L / AST: 17 U/L / GGT: x             Urinalysis Basic - ( 20 Apr 2024 02:29 )    Color: x / Appearance: x / SG: x / pH: x  Gluc: 312 mg/dL / Ketone: x  / Bili: x / Urobili: x   Blood: x / Protein: x / Nitrite: x   Leuk Esterase: x / RBC: x / WBC x   Sq Epi: x / Non Sq Epi: x / Bacteria: x                  MICROBIOLOGY/CULTURES:  Culture Results:   No growth at 24 hours (04-19 @ 03:15)  Culture Results:   No growth at 24 hours (04-19 @ 03:15)  Culture Results:   No growth at 48 Hours (04-17 @ 09:50)  Culture Results:   Growth in anaerobic bottle: Staphylococcus epidermidis  Isolation of Coagulase negative Staphylococcus from single blood culture  sets may represent  contamination. Contact the Microbiology Department at [phone] if  susceptibility testing is  clinically indicated.  Direct identification is available within approximately 3-5  hours either by Blood Panel Multiplexed PCR or Direct  MALDI-TOF. Details: [email or web address] (04-17 @ 09:50)  Culture Results:   No growth (04-16 @ 18:30)          ## Medications:  MEDICATIONS  (STANDING):  albuterol/ipratropium for Nebulization 3 milliLiter(s) Nebulizer every 6 hours  apixaban 5 milliGRAM(s) Oral every 12 hours  aspirin enteric coated 81 milliGRAM(s) Oral daily  atorvastatin 80 milliGRAM(s) Oral at bedtime  chlorhexidine 0.12% Liquid 15 milliLiter(s) Oral Mucosa every 12 hours  chlorhexidine 2% Cloths 1 Application(s) Topical <User Schedule>  dexMEDEtomidine Infusion 0.2 MICROgram(s)/kG/Hr (4.46 mL/Hr) IV Continuous <Continuous>  insulin glargine Injectable (LANTUS) 23 Unit(s) SubCutaneous at bedtime  insulin lispro (ADMELOG) corrective regimen sliding scale   SubCutaneous every 6 hours  insulin lispro Injectable (ADMELOG) 4 Unit(s) SubCutaneous every 6 hours  levETIRAcetam 1000 milliGRAM(s) Oral two times a day  metoprolol tartrate 25 milliGRAM(s) Oral two times a day  piperacillin/tazobactam IVPB.. 3.375 Gram(s) IV Intermittent every 8 hours  polyethylene glycol 3350 17 Gram(s) Oral daily  senna 2 Tablet(s) Oral at bedtime  tamsulosin 0.4 milliGRAM(s) Oral at bedtime  valproic  acid Syrup 750 milliGRAM(s) Oral daily    ## O/E:I&O's Summary    19 Apr 2024 07:01  -  20 Apr 2024 07:00  --------------------------------------------------------  IN: 1646.2 mL / OUT: 1875 mL / NET: -228.8 mL    20 Apr 2024 07:01  -  20 Apr 2024 12:30  --------------------------------------------------------  IN: 263.4 mL / OUT: 110 mL / NET: 153.4 mL        POCUS :   DVT PPX eliquis  ## Code status:  Goals of care discussion: [X yes [ ] no  [x] full code  [ ] DNR  [ ] DNI  [ ] DANITZAST  
INTERVAL HPI/OVERNIGHT EVENTS:   HPI:  73-year-old male with a PMH of  DM, HTN, Seizure, TBI, COPD CHF cardiac arrest BPH Presents to the ED from [facility] for Dyspnea. At baseline, patient uses BiPap intermittently throughout the rohan, patient had an episode of vomiting while on the Bipap. Then continued to have SOB for about an hour, sent to the ED for further evaluation. Upon ED arrival patient was tachypneic with increase work of breathing, placed on Bipap. Upon evaluation, patient is AAOx3, No distress, able to communicate full sentence (at his own pace due to CVA residual). Labs remarkable for WBC:15.30, K:5.9, troponin 277, ECG-NSR no T-wave or ST changes, BP:146/83, HR: 83 Temp: 100.9. Received Zosyn, Tylenol, Kayexalate, Humulin 5U, Albuterol. (14 Apr 2024 07:04)      CENTRAL LINE: [ ] YES [ ] NO  LOCATION:   DATE INSERTED:  REMOVE: [ ] YES [ ] NO  EXPLAIN:    LUJAN: [ ] YES [ ] NO    DATE INSERTED:  REMOVE:  [ ] YES [ ] NO  EXPLAIN:    A-LINE:  [ ] YES [ ] NO  LOCATION:   DATE INSERTED:  REMOVE:  [ ] YES [ ] NO  EXPLAIN:    PAST MEDICAL & SURGICAL HISTORY:  HTN (hypertension)      HLD (hyperlipidemia)      Diabetes mellitus      Lacunar infarction      BPH (benign prostatic hyperplasia)      CHF (congestive heart failure)      Chronic obstructive pulmonary disease, unspecified COPD type      BPH without urinary obstruction      History of chronic ischemic heart disease      Cardiac pacemaker      Cardiac arrest      Seizures      TBI (traumatic brain injury)      S/P CABG (coronary artery bypass graft)  2014          REVIEW OF SYSTEMS:    Negative ROS aside from HPI/Interval events above.    ICU Vital Signs Last 24 Hrs  T(C): 37.4 (17 Apr 2024 11:30), Max: 38.1 (16 Apr 2024 14:45)  T(F): 99.3 (17 Apr 2024 11:30), Max: 100.6 (16 Apr 2024 14:45)  HR: 71 (17 Apr 2024 11:30) (58 - 143)  BP: 118/58 (17 Apr 2024 11:30) (73/50 - 210/141)  BP(mean): 77 (17 Apr 2024 11:30) (56 - 156)  ABP: --  ABP(mean): --  RR: 22 (17 Apr 2024 11:30) (13 - 33)  SpO2: 94% (17 Apr 2024 11:30) (92% - 100%)    O2 Parameters below as of 17 Apr 2024 06:17  Patient On (Oxygen Delivery Method): ventilator            ABG - ( 17 Apr 2024 09:52 )  pH, Arterial: 7.50  pH, Blood: x     /  pCO2: 30    /  pO2: 112   / HCO3: 23    / Base Excess: 1.0   /  SaO2: 97.8                I&O's Detail    16 Apr 2024 07:01  -  17 Apr 2024 07:00  --------------------------------------------------------  IN:    FentaNYL: 69.7 mL    IV PiggyBack: 62.5 mL    Lactated Ringers Bolus: 500 mL    Propofol: 275.3 mL  Total IN: 907.5 mL    OUT:    Norepinephrine: 0 mL    Voided (mL): 410 mL  Total OUT: 410 mL    Total NET: 497.5 mL      17 Apr 2024 07:01  -  17 Apr 2024 11:36  --------------------------------------------------------  IN:    FentaNYL: 18 mL    IV PiggyBack: 125 mL    IV PiggyBack: 100 mL    Propofol: 48.6 mL  Total IN: 291.6 mL    OUT:    Indwelling Catheter - Urethral (mL): 130 mL    Norepinephrine: 0 mL  Total OUT: 130 mL    Total NET: 161.6 mL          Mode: CPAP with PS  FiO2: 25  PEEP: 5  PS: 10  ITime: 1  MAP: 9  PIP: 15    CAPILLARY BLOOD GLUCOSE      POCT Blood Glucose.: 254 mg/dL (17 Apr 2024 11:22)  POCT Blood Glucose.: 222 mg/dL (17 Apr 2024 05:20)  POCT Blood Glucose.: 218 mg/dL (16 Apr 2024 23:11)  POCT Blood Glucose.: 237 mg/dL (16 Apr 2024 19:16)  POCT Blood Glucose.: 314 mg/dL (16 Apr 2024 13:48)        PHYSICAL EXAM:    GENERAL: NAD, lying in bed intubated/sedated  HEAD:  Atraumatic, normocephalic  EYES: conjunctiva and sclera clear  NECK: Supple, trachea midline, no JVD  HEART: Regular rate and rhythm, no murmurs, rubs, or gallops  LUNGS: intubated, mechanically breath sounds bilaterally, b/l rhonchi.   ABDOMEN: Soft, nontender, nondistended,   EXTREMITIES: No clubbing, cyanosis. Mild LE edema b/l.   NERVOUS SYSTEM:   sedated, unable to perform accurate neurological exam at this time       LABS:                        12.4   9.02  )-----------( 183      ( 17 Apr 2024 02:40 )             37.4      04-17    142  |  110<H>  |  24<H>  ----------------------------<  210<H>  3.5   |  24  |  0.88    Ca    8.2<L>      17 Apr 2024 02:40  Phos  1.6     04-17  Mg     2.1     04-17    TPro  5.9<L>  /  Alb  1.8<L>  /  TBili  0.3  /  DBili  x   /  AST  22  /  ALT  17  /  AlkPhos  63  04-17    PT/INR - ( 17 Apr 2024 02:40 )   PT: 10.0 sec;   INR: 0.84 ratio         PTT - ( 17 Apr 2024 02:40 )  PTT:27.9 sec  Urinalysis Basic - ( 17 Apr 2024 02:40 )    Color: x / Appearance: x / SG: x / pH: x  Gluc: 210 mg/dL / Ketone: x  / Bili: x / Urobili: x   Blood: x / Protein: x / Nitrite: x   Leuk Esterase: x / RBC: x / WBC x   Sq Epi: x / Non Sq Epi: x / Bacteria: x    
CC: Patient is a 73y old  Male who presents with a chief complaint of Aspiration Pneumonia (20 Apr 2024 12:27)      ## HPI:HPI:  73-year-old male with a PMH of  DM, HTN, Seizure, TBI, COPD CHF cardiac arrest BPH Presents to the ED from [facility] for Dyspnea. At baseline, patient uses BiPap intermittently throughout the rohan, patient had an episode of vomiting while on the Bipap. Then continued to have SOB for about an hour, sent to the ED for further evaluation. Upon ED arrival patient was tachypneic with increase work of breathing, placed on Bipap. Upon evaluation, patient is AAOx3, No distress, able to communicate full sentence (at his own pace due to CVA residual). Labs remarkable for WBC:15.30, K:5.9, troponin 277, ECG-NSR no T-wave or ST changes, BP:146/83, HR: 83 Temp: 100.9. Received Zosyn, Tylenol, Kayexalate, Humulin 5U, Albuterol. (14 Apr 2024 07:04)      O/N: febrile    ## ROS:  unable to assess ros due to mental status   ## EXAM  ICU Vital Signs Last 24 Hrs  T(C): 37.5 (21 Apr 2024 09:00), Max: 38.6 (20 Apr 2024 14:30)  T(F): 99.5 (21 Apr 2024 09:00), Max: 101.5 (20 Apr 2024 14:30)  HR: 72 (21 Apr 2024 11:42) (57 - 75)  BP: 160/71 (21 Apr 2024 09:00) (131/63 - 170/73)  BP(mean): 97 (21 Apr 2024 09:00) (81 - 106)  ABP: --  ABP(mean): --  RR: 32 (21 Apr 2024 09:00) (18 - 37)  SpO2: 98% (21 Apr 2024 11:42) (98% - 100%)    O2 Parameters below as of 21 Apr 2024 11:42  Patient On (Oxygen Delivery Method): ventilator          CON : NAD  EENT : EOMI, MMM  NECK : Full ROM  RESP : CTAB no increased WOB  CARD : rrr no m/r/g  ABD : S NT ND NABS no rebound  EXT : No edema  neuro : opens eyes  ## Labs:  Lab Results:  CBC  CBC Full  -  ( 21 Apr 2024 04:00 )  WBC Count : 8.62 K/uL  RBC Count : 4.10 M/uL  Hemoglobin : 12.8 g/dL  Hematocrit : 38.3 %  Platelet Count - Automated : 221 K/uL  Mean Cell Volume : 93.4 fl  Mean Cell Hemoglobin : 31.2 pg  Mean Cell Hemoglobin Concentration : 33.4 g/dL  Auto Neutrophil # : x  Auto Lymphocyte # : x  Auto Monocyte # : x  Auto Eosinophil # : x  Auto Basophil # : x  Auto Neutrophil % : x  Auto Lymphocyte % : x  Auto Monocyte % : x  Auto Eosinophil % : x  Auto Basophil % : x    .		Differential:	[] Automated		[] Manual  Chemistry                        12.8   8.62  )-----------( 221      ( 21 Apr 2024 04:00 )             38.3     04-21    140  |  105  |  22  ----------------------------<  373<H>  3.8   |  30  |  0.87    Ca    8.5      21 Apr 2024 04:00  Phos  2.9     04-21  Mg     1.9     04-21    TPro  6.0  /  Alb  1.7<L>  /  TBili  0.2  /  DBili  x   /  AST  16  /  ALT  19  /  AlkPhos  86  04-21    LIVER FUNCTIONS - ( 21 Apr 2024 04:00 )  Alb: 1.7 g/dL / Pro: 6.0 gm/dL / ALK PHOS: 86 U/L / ALT: 19 U/L / AST: 16 U/L / GGT: x             Urinalysis Basic - ( 21 Apr 2024 04:00 )    Color: x / Appearance: x / SG: x / pH: x  Gluc: 373 mg/dL / Ketone: x  / Bili: x / Urobili: x   Blood: x / Protein: x / Nitrite: x   Leuk Esterase: x / RBC: x / WBC x   Sq Epi: x / Non Sq Epi: x / Bacteria: x                  MICROBIOLOGY/CULTURES:  Culture Results:   No growth (04-19 @ 18:45)  Culture Results:   Growth in anaerobic bottle: Staphylococcus epidermidis Susceptibility to  follow. (04-19 @ 03:15)  Culture Results:   No growth at 48 Hours (04-19 @ 03:15)  Culture Results:   No growth at 72 Hours (04-17 @ 09:50)  Culture Results:   Growth in anaerobic bottle: Staphylococcus epidermidis  Isolation of Coagulase negative Staphylococcus from single blood culture  sets may represent  contamination. Contact the Microbiology Department at [phone] if  susceptibility testing is  clinically indicated.  Direct identification is available within approximately 3-5  hours either by Blood Panel Multiplexed PCR or Direct  MALDI-TOF. Details: [email or web address] (04-17 @ 09:50)  Culture Results:   No growth (04-16 @ 18:30)      RADIOLOGY RESULTS:        ## Medications:  MEDICATIONS  (STANDING):  albuterol/ipratropium for Nebulization 3 milliLiter(s) Nebulizer every 6 hours  apixaban 5 milliGRAM(s) Oral every 12 hours  aspirin enteric coated 81 milliGRAM(s) Oral daily  atorvastatin 80 milliGRAM(s) Oral at bedtime  chlorhexidine 0.12% Liquid 15 milliLiter(s) Oral Mucosa every 12 hours  chlorhexidine 2% Cloths 1 Application(s) Topical <User Schedule>  dexMEDEtomidine Infusion 0.2 MICROgram(s)/kG/Hr (4.46 mL/Hr) IV Continuous <Continuous>  insulin lispro (ADMELOG) corrective regimen sliding scale   SubCutaneous every 6 hours  insulin lispro Injectable (ADMELOG) 6 Unit(s) SubCutaneous every 6 hours  levETIRAcetam 1000 milliGRAM(s) Oral two times a day  metoprolol tartrate 25 milliGRAM(s) Oral two times a day  polyethylene glycol 3350 17 Gram(s) Oral daily  senna 2 Tablet(s) Oral at bedtime  tamsulosin 0.4 milliGRAM(s) Oral at bedtime  valproic  acid Syrup 750 milliGRAM(s) Oral daily    ## O/E:I&O's Summary    20 Apr 2024 07:01  -  21 Apr 2024 07:00  --------------------------------------------------------  IN: 2367.2 mL / OUT: 3310 mL / NET: -942.8 mL        POCUS :   DVT PPX eliquis  ## Code status:  Goals of care discussion: [] yes [ ] no  [x] full code  [ ] DNR  [ ] DNI  [ ] JOSÉ MANUEL  
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 s/p trach/PEG on . Pt initially felt to have anoxic encephalopathy but MS improved. Eventually pt with removal of trach and PEG. Pt transferred fro [facility] on  2 to vomiting while on BiPAP; admitted with working dx of aspiration PNA. Today RRT called 2 to hypoxemia which improved, resp distress, lethargy and HTN. In the ICU pt intubated. ICU dx: Acute hypoxic and hypercapneic resp failure requiring intubation possibly 2 to aspiration      ## Labs:  CBC:                        12.6   10.67 )-----------( 242      ( 2024 02:50 )             39.1     Chem:      147<H>  |  108  |  21  ----------------------------<  223<H>  3.7   |  29  |  0.82    Ca    8.8      2024 02:50  Phos  2.9       Mg     2.1         TPro  6.0  /  Alb  1.6<L>  /  TBili  0.2  /  DBili  x   /  AST  18  /  ALT  17  /  AlkPhos  67  -    Coags:    culture blood:  --   @ 04:40            culture sputum:     No growth at 24 hours           culture urine:  --  @ 04:40        ## Imaging:    ## Medications:    doxazosin 1 milliGRAM(s) Oral at bedtime  metoprolol tartrate 25 milliGRAM(s) Oral two times a day    albuterol    90 MICROgram(s) HFA Inhaler 2 Puff(s) Inhalation every 6 hours PRN  albuterol/ipratropium for Nebulization 3 milliLiter(s) Nebulizer every 6 hours    atorvastatin 80 milliGRAM(s) Oral at bedtime  insulin glargine Injectable (LANTUS) 30 Unit(s) SubCutaneous every morning  insulin lispro (ADMELOG) corrective regimen sliding scale   SubCutaneous every 6 hours  insulin lispro Injectable (ADMELOG) 6 Unit(s) SubCutaneous every 6 hours    apixaban 5 milliGRAM(s) Oral every 12 hours  aspirin  chewable 81 milliGRAM(s) Oral daily    polyethylene glycol 3350 17 Gram(s) Oral daily  senna 2 Tablet(s) Oral at bedtime    acetaminophen     Tablet .. 650 milliGRAM(s) Oral every 6 hours PRN  dexMEDEtomidine Infusion 0.2 MICROgram(s)/kG/Hr IV Continuous <Continuous>  levETIRAcetam 1000 milliGRAM(s) Oral two times a day  ondansetron Injectable 4 milliGRAM(s) IV Push every 8 hours PRN  valproic  acid Syrup 750 milliGRAM(s) Oral daily      ## Vitals:  T(C): 37.3 (24 @ 22:00), Max: 38.2 (24 @ 00:00)  HR: 61 (24 @ 23:10) (54 - 85)  BP: 165/74 (24 @ 22:00) (119/64 - 165/74)  BP(mean): 101 (24 @ 22:00) (77 - 134)  RR: 18 (24 @ 22:00) (18 - 34)  SpO2: 99% (24 @ 23:10) (95% - 100%)  Wt(kg): --  Vent: Mode: AC/ CMV (Assist Control/ Continuous Mandatory Ventilation), RR (machine): 18, RR (patient): 20, TV (machine): 400, FiO2: 25, PEEP: 5, PIP: 23  AB-21 @ 07:01  -   @ 07:00  --------------------------------------------------------  IN: 1817.2 mL / OUT: 300 mL / NET: 1517.2 mL     @ 07: @ 23:19  --------------------------------------------------------  IN: 639.6 mL / OUT: 900 mL / NET: -260.4 mL          ## P/E:  Gen: lying comfortably in bed in no apparent distress  Mouth: (+) ETT  Lungs: CTA  Heart: Paced  Abd: Soft/+BS/ Non-tender  Ext: Edema  Neuro: Awake, alert    CENTRAL LINE: [ ] YES [ ] NO  LOCATION:   DATE INSERTED:  REMOVE: [ ] YES [ ] NO      LE: [ ] YES [ ] NO    DATE INSERTED:  REMOVE:  [ ] YES [ ] NO      A-LINE:  [ ] YES [ ] NO  LOCATION:   DATE INSERTED:  REMOVE:  [ ] YES [ ] NO  EXPLAIN:    CODE STATUS: [x ] full code  [ ] DNR  [ ] DNI  [ ] [facility]  Goals of care discussion: [ ] yes

## 2024-05-01 NOTE — PROGRESS NOTE ADULT - ASSESSMENT
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 s/p trach/PEG on 12/5. Pt initially felt to have anoxic encephalopathy but MS improved. Eventually pt with removal of trach and PEG. Pt transferred [facility] on 4/14 2 to vomiting while on BiPAP; admitted with working dx of aspiration PNA. Today RRT called 2 to hypoxemia which improved, resp distress, lethargy and HTN. In the ICU pt intubated. ICU dx: Acute hypoxic and hypercapneic resp failure requiring intubation possibly 2 to aspiration. s/p extubation 4/23 and later in the evening was placed on NIV. 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 s/p trach/PEG on 12/5. Pt initially felt to have anoxic encephalopathy but MS improved. Eventually pt with removal of trach and PEG. Pt transferred [facility] on 4/14 2 to vomiting while on BiPAP; admitted with working dx of aspiration PNA. Today RRT called 2 to hypoxemia which improved, resp distress, lethargy and HTN. In the ICU pt intubated. ICU dx: Acute hypoxic and hypercapneic resp failure requiring intubation possibly 2 to aspiration. s/p extubation 4/23 and later in the evening was placed on NIV. Currently off NIV    #ARDS, resolved  #Acute hypoxic respiratory failure   #Pneumonia, aspiration.   · Pt transferred [facility] on 4/14 2 to vomiting while on BiPAP; admitted with working dx of aspiration PNA. Today RRT called 2 to hypoxemia which improved, resp distress, lethargy and HTN. In the ICU pt intubated. ICU dx: Acute hypoxic and hypercapneic resp failure requiring intubation possibly 2 to aspiration. s/p extubation 4/23 and later in the evening was placed on NIV. S/p Antibiotics. Tolerating Bipap on current setting. Transfer to [facility] tomorrow if he tolerated current Bipap settings.     # Seizure.   · C/w Keppra 1000mg, Valproic 750mg, Primidone 100mg   - Seizure precaution.    # Diabetes mellitus.   ·  ISS with hypoglycemia protocol     Pending return to [facility] tomorrow

## 2024-05-01 NOTE — PROGRESS NOTE ADULT - TIME BILLING
I have spent a total of 52 minutes to prepare to see the patient, obtaining and reviewing history, physical examination, explaining the diagnosis, prognosis and treatment plan with the patient/family/caregiver. I also have spent the time ordering studies and testing, interpreting results, medicine reconciliation, IDRs, subspecialty consultation and documentation as above.

## 2024-05-01 NOTE — CHART NOTE - NSCHARTNOTEFT_GEN_A_CORE
Pt c complicated PMHx; was here at [facility] starting 11/22    Factors impacting intake: [ ] none [ ] nausea  [ ] vomiting [ ] diarrhea [ ] constipation  [ ]chewing problems [ ] swallowing issues  [ ] other:     Diet Prescription: Diet, DASH/TLC:   Sodium & Cholesterol Restricted  Minced and Moist (MINCEDMOIST)  Mildly Thick Liquids (MILDTHICKLIQS) (04-30-24 @ 14:45)    Intake:     Current Weight: Weight (kg): 88.9 (04-30 @ 06:00)  % Weight Change    Pertinent Medications: MEDICATIONS  (STANDING):  albuterol/ipratropium for Nebulization 3 milliLiter(s) Nebulizer every 6 hours  apixaban 5 milliGRAM(s) Oral every 12 hours  aspirin  chewable 81 milliGRAM(s) Oral daily  atorvastatin 80 milliGRAM(s) Oral at bedtime  chlorhexidine 2% Cloths 1 Application(s) Topical <User Schedule>  doxazosin 1 milliGRAM(s) Oral at bedtime  insulin lispro (ADMELOG) corrective regimen sliding scale   SubCutaneous Before meals and at bedtime  levETIRAcetam 1000 milliGRAM(s) Oral two times a day  metoprolol tartrate 25 milliGRAM(s) Oral two times a day  nystatin Powder 1 Application(s) Topical two times a day  polyethylene glycol 3350 17 Gram(s) Oral daily  potassium phosphate / sodium phosphate Powder (PHOS-NaK) 1 Packet(s) Oral three times a day with meals  primidone 100 milliGRAM(s) Oral three times a day  sacubitril 24 mG/valsartan 26 mG 1 Tablet(s) Oral two times a day  senna 2 Tablet(s) Oral at bedtime  valproic  acid Syrup 750 milliGRAM(s) Oral daily    MEDICATIONS  (PRN):  acetaminophen     Tablet .. 650 milliGRAM(s) Oral every 6 hours PRN Temp greater or equal to 38C (100.4F), Mild Pain (1 - 3)  albuterol    90 MICROgram(s) HFA Inhaler 2 Puff(s) Inhalation every 6 hours PRN Shortness of Breath and/or Wheezing    Pertinent Labs: 04-29 Na142 mmol/L Glu 95 mg/dL K+ 3.8 mmol/L Cr  0.60 mg/dL BUN 15 mg/dL 04-29 Phos 2.2 mg/dL<L> 04-29 Alb 1.7 g/dL<L>    04-15-24 @ 05:25  A1C 9.2    Skin:     Estimated Needs:   [ ] no change since previous assessment  [ ] recalculated:     Previous Nutrition Diagnosis:   [ ] Inadequate Energy Intake [ ]Inadequate Oral Intake [ ] Excessive Energy Intake   [ ] Underweight [ ] Increased Nutrient Needs [ ] Overweight/Obesity   [ ] Altered GI Function [ ] Unintended Weight Loss [ ] Food & Nutrition Related Knowledge Deficit [ ] Malnutrition     Nutrition Diagnosis is [ ] ongoing  [ ] resolved [ ] not applicable     New Nutrition Diagnosis: [ ] not applicable      Interventions:   Recommend  [ ] Change Diet To:  [ ] Nutrition Supplement  [ ] Nutrition Support  [ ] Other:     Monitoring and Evaluation:   [ ] PO intake [ x ] Tolerance to diet prescription [ x ] weights [ x ] labs[ x ] follow up per protocol  [ ] other: Pt c complicated PMHx; was here at [facility] starting 11/14/2022 - 3/18/2023 for cardiac arrest c multiple complications including new onset seizures & trach & PEG placement (12/5/22); trach & PEG eventually removed.  Pt re-admitted for short stays in April 2023 & August 2023 c several outpatient & ED visits in between hospitalizations; pt has been at [facility] in-between hospitalizations.  Now admitted for vomiting episode while on BiPAP; dx c respiratory failure 2/2 aspiration PNA; intubated on 4/16 & transferred to ICU for intubation & monitoring after he was found c water bottle in his hand; likely aspirated; extubated 4/23 & transferred to medical floor.  Pt was on tube feeding during much of admission (4/17 - 4/26); TF d/c and at that time pt placed on pureed consistency; on 4/30 swallow eval conducted; SLP advanced diet as indicated below.      Factors impacting intake: [ ] none [ ] nausea  [ ] vomiting [ ] diarrhea [ ] constipation  [ ]chewing problems [ ] swallowing issues  [ X] other: self-feeding difficulty    Diet Prescription: Diet, DASH/TLC:   Sodium & Cholesterol Restricted  Minced and Moist (MINCEDMOIST)  Mildly Thick Liquids (MILDTHICKLIQS) (04-30-24 @ 14:45)    Intake:   pt eating well 50 - 100% most meals    Current Weight: Weight (kg): 88.9 (04-30) c 1+ edema b/l ankles & 2+ b/l arms; previous wt 89.2 kg (4/16) c 2+ generalized edema noted  % Weight Change: wt stable x 2 weeks    Pertinent Medications: MEDICATIONS  (STANDING):  albuterol/ipratropium for Nebulization 3 milliLiter(s) Nebulizer every 6 hours  apixaban 5 milliGRAM(s) Oral every 12 hours  aspirin  chewable 81 milliGRAM(s) Oral daily  atorvastatin 80 milliGRAM(s) Oral at bedtime  chlorhexidine 2% Cloths 1 Application(s) Topical <User Schedule>  doxazosin 1 milliGRAM(s) Oral at bedtime  insulin lispro (ADMELOG) corrective regimen sliding scale   SubCutaneous Before meals and at bedtime  levETIRAcetam 1000 milliGRAM(s) Oral two times a day  metoprolol tartrate 25 milliGRAM(s) Oral two times a day  nystatin Powder 1 Application(s) Topical two times a day  polyethylene glycol 3350 17 Gram(s) Oral daily  potassium phosphate / sodium phosphate Powder (PHOS-NaK) 1 Packet(s) Oral three times a day with meals  primidone 100 milliGRAM(s) Oral three times a day  sacubitril 24 mG/valsartan 26 mG 1 Tablet(s) Oral two times a day  senna 2 Tablet(s) Oral at bedtime  valproic  acid Syrup 750 milliGRAM(s) Oral daily    MEDICATIONS  (PRN):  acetaminophen     Tablet .. 650 milliGRAM(s) Oral every 6 hours PRN Temp greater or equal to 38C (100.4F), Mild Pain (1 - 3)  albuterol    90 MICROgram(s) HFA Inhaler 2 Puff(s) Inhalation every 6 hours PRN Shortness of Breath and/or Wheezing    Pertinent Labs: 04-29 Na142 mmol/L Glu 95 mg/dL K+ 3.8 mmol/L Cr  0.60 mg/dL BUN 15 mg/dL 04-29 Phos 2.2 mg/dL<L> 04-29 Alb 1.7 g/dL<L>  04-15-24 A1C 9.2%; 04-30 POCT: 136, 132, 215, 226, 241, 226    Skin:   no pressure ulcers noted    Estimated Needs:   [X ] no change since previous assessment (4/18)  [ ] recalculated:     Previous Nutrition Diagnosis: (04/18)  [X]  Altered Nutrition Related Lab Values   Etiology: endocrine dysfunction   Signs/Symptoms: A1C=9.2%     GOAL: blood glucose goal 140-180 mg/dL - not met at this time    Nutrition Diagnosis is [ x] ongoing  [ ] resolved [ ] not applicable     New Nutrition Diagnosis: [x ] not applicable       Interventions:   continue current diet rx as noted  Recommend  [ ] Change Diet To:  [ ] Nutrition Supplement  [ ] Nutrition Support  [ ] Other:     Monitoring and Evaluation:   [X ] PO intake [ x ] Tolerance to diet prescription [ x ] weights [ x ] labs[ x ] follow up per protocol  [ ] other:

## 2024-05-01 NOTE — PROGRESS NOTE ADULT - REASON FOR ADMISSION
Aspiration Pneumonia

## 2024-05-01 NOTE — PROGRESS NOTE ADULT - PROVIDER SPECIALTY LIST ADULT
Hospitalist
Critical Care
Dental
Internal Medicine
Critical Care
Hospitalist
Hospitalist
Critical Care

## 2024-05-02 ENCOUNTER — NON-APPOINTMENT (OUTPATIENT)
Age: 74
End: 2024-05-02

## 2024-05-02 VITALS
DIASTOLIC BLOOD PRESSURE: 66 MMHG | HEART RATE: 76 BPM | RESPIRATION RATE: 18 BRPM | SYSTOLIC BLOOD PRESSURE: 122 MMHG | OXYGEN SATURATION: 97 % | TEMPERATURE: 98 F

## 2024-05-02 LAB
GLUCOSE BLDC GLUCOMTR-MCNC: 201 MG/DL — HIGH (ref 70–99)
GLUCOSE BLDC GLUCOMTR-MCNC: 278 MG/DL — HIGH (ref 70–99)

## 2024-05-02 PROCEDURE — 99239 HOSP IP/OBS DSCHRG MGMT >30: CPT

## 2024-05-02 RX ADMIN — CHLORHEXIDINE GLUCONATE 1 APPLICATION(S): 213 SOLUTION TOPICAL at 05:50

## 2024-05-02 RX ADMIN — APIXABAN 5 MILLIGRAM(S): 2.5 TABLET, FILM COATED ORAL at 05:51

## 2024-05-02 RX ADMIN — PRIMIDONE 100 MILLIGRAM(S): 250 TABLET ORAL at 05:51

## 2024-05-02 RX ADMIN — Medication 81 MILLIGRAM(S): at 11:20

## 2024-05-02 RX ADMIN — Medication 6: at 11:52

## 2024-05-02 RX ADMIN — Medication 25 MILLIGRAM(S): at 05:52

## 2024-05-02 RX ADMIN — SACUBITRIL AND VALSARTAN 1 TABLET(S): 24; 26 TABLET, FILM COATED ORAL at 05:52

## 2024-05-02 RX ADMIN — Medication 4: at 08:51

## 2024-05-02 RX ADMIN — Medication 1 PACKET(S): at 10:39

## 2024-05-02 RX ADMIN — Medication 3 MILLILITER(S): at 11:32

## 2024-05-02 RX ADMIN — Medication 750 MILLIGRAM(S): at 11:20

## 2024-05-02 RX ADMIN — LEVETIRACETAM 1000 MILLIGRAM(S): 250 TABLET, FILM COATED ORAL at 05:51

## 2024-05-02 RX ADMIN — NYSTATIN CREAM 1 APPLICATION(S): 100000 CREAM TOPICAL at 05:53

## 2024-05-02 RX ADMIN — Medication 3 MILLILITER(S): at 05:16

## 2024-05-08 DIAGNOSIS — Z95.5 PRESENCE OF CORONARY ANGIOPLASTY IMPLANT AND GRAFT: ICD-10-CM

## 2024-05-08 DIAGNOSIS — N40.0 BENIGN PROSTATIC HYPERPLASIA WITHOUT LOWER URINARY TRACT SYMPTOMS: ICD-10-CM

## 2024-05-08 DIAGNOSIS — E78.5 HYPERLIPIDEMIA, UNSPECIFIED: ICD-10-CM

## 2024-05-08 DIAGNOSIS — G93.41 METABOLIC ENCEPHALOPATHY: ICD-10-CM

## 2024-05-08 DIAGNOSIS — Z95.1 PRESENCE OF AORTOCORONARY BYPASS GRAFT: ICD-10-CM

## 2024-05-08 DIAGNOSIS — Z95.0 PRESENCE OF CARDIAC PACEMAKER: ICD-10-CM

## 2024-05-08 DIAGNOSIS — E11.22 TYPE 2 DIABETES MELLITUS WITH DIABETIC CHRONIC KIDNEY DISEASE: ICD-10-CM

## 2024-05-08 DIAGNOSIS — I25.10 ATHEROSCLEROTIC HEART DISEASE OF NATIVE CORONARY ARTERY WITHOUT ANGINA PECTORIS: ICD-10-CM

## 2024-05-08 DIAGNOSIS — J44.9 CHRONIC OBSTRUCTIVE PULMONARY DISEASE, UNSPECIFIED: ICD-10-CM

## 2024-05-08 DIAGNOSIS — I50.32 CHRONIC DIASTOLIC (CONGESTIVE) HEART FAILURE: ICD-10-CM

## 2024-05-08 DIAGNOSIS — E87.5 HYPERKALEMIA: ICD-10-CM

## 2024-05-08 DIAGNOSIS — J69.0 PNEUMONITIS DUE TO INHALATION OF FOOD AND VOMIT: ICD-10-CM

## 2024-05-08 DIAGNOSIS — J96.01 ACUTE RESPIRATORY FAILURE WITH HYPOXIA: ICD-10-CM

## 2024-05-08 DIAGNOSIS — G40.909 EPILEPSY, UNSPECIFIED, NOT INTRACTABLE, WITHOUT STATUS EPILEPTICUS: ICD-10-CM

## 2024-05-08 DIAGNOSIS — I13.0 HYPERTENSIVE HEART AND CHRONIC KIDNEY DISEASE WITH HEART FAILURE AND STAGE 1 THROUGH STAGE 4 CHRONIC KIDNEY DISEASE, OR UNSPECIFIED CHRONIC KIDNEY DISEASE: ICD-10-CM

## 2024-05-08 DIAGNOSIS — N18.30 CHRONIC KIDNEY DISEASE, STAGE 3 UNSPECIFIED: ICD-10-CM

## 2024-05-13 ENCOUNTER — OUTPATIENT (OUTPATIENT)
Dept: OUTPATIENT SERVICES | Facility: HOSPITAL | Age: 74
LOS: 1 days | Discharge: HOME | End: 2024-05-13
Payer: MEDICAID

## 2024-05-13 ENCOUNTER — APPOINTMENT (OUTPATIENT)
Dept: RADIOLOGY | Facility: HOSPITAL | Age: 74
End: 2024-05-13

## 2024-05-13 DIAGNOSIS — R13.12 DYSPHAGIA, OROPHARYNGEAL PHASE: ICD-10-CM

## 2024-05-13 DIAGNOSIS — Z95.1 PRESENCE OF AORTOCORONARY BYPASS GRAFT: Chronic | ICD-10-CM

## 2024-05-13 PROCEDURE — 70371 SPEECH EVALUATION COMPLEX: CPT | Mod: 26

## 2024-05-20 ENCOUNTER — APPOINTMENT (OUTPATIENT)
Dept: CARDIOLOGY | Facility: CLINIC | Age: 74
End: 2024-05-20
Payer: MEDICAID

## 2024-05-20 ENCOUNTER — NON-APPOINTMENT (OUTPATIENT)
Age: 74
End: 2024-05-20

## 2024-05-20 DIAGNOSIS — E78.5 HYPERLIPIDEMIA, UNSPECIFIED: ICD-10-CM

## 2024-05-20 DIAGNOSIS — I10 ESSENTIAL (PRIMARY) HYPERTENSION: ICD-10-CM

## 2024-05-20 DIAGNOSIS — I48.0 PAROXYSMAL ATRIAL FIBRILLATION: ICD-10-CM

## 2024-05-20 DIAGNOSIS — I50.30 UNSPECIFIED DIASTOLIC (CONGESTIVE) HEART FAILURE: ICD-10-CM

## 2024-05-20 PROCEDURE — 99214 OFFICE O/P EST MOD 30 MIN: CPT | Mod: 95

## 2024-05-20 PROCEDURE — G2211 COMPLEX E/M VISIT ADD ON: CPT | Mod: NC,1L

## 2024-05-24 ENCOUNTER — APPOINTMENT (OUTPATIENT)
Dept: CARDIOLOGY | Facility: CLINIC | Age: 74
End: 2024-05-24

## 2024-05-29 ENCOUNTER — OUTPATIENT (OUTPATIENT)
Dept: OUTPATIENT SERVICES | Facility: HOSPITAL | Age: 74
LOS: 1 days | Discharge: HOME | End: 2024-05-29
Payer: MEDICAID

## 2024-05-29 DIAGNOSIS — M25.512 PAIN IN LEFT SHOULDER: ICD-10-CM

## 2024-05-29 DIAGNOSIS — M79.622 PAIN IN LEFT UPPER ARM: ICD-10-CM

## 2024-05-29 DIAGNOSIS — Z95.1 PRESENCE OF AORTOCORONARY BYPASS GRAFT: Chronic | ICD-10-CM

## 2024-05-29 PROCEDURE — 73030 X-RAY EXAM OF SHOULDER: CPT | Mod: 26,LT

## 2024-05-29 PROCEDURE — 73060 X-RAY EXAM OF HUMERUS: CPT | Mod: 26,LT

## 2024-05-30 ENCOUNTER — APPOINTMENT (OUTPATIENT)
Dept: NEUROLOGY | Facility: HOSPITAL | Age: 74
End: 2024-05-30

## 2024-05-30 ENCOUNTER — OUTPATIENT (OUTPATIENT)
Dept: OUTPATIENT SERVICES | Facility: HOSPITAL | Age: 74
LOS: 1 days | End: 2024-05-30
Payer: MEDICAID

## 2024-05-30 DIAGNOSIS — R51.9 HEADACHE, UNSPECIFIED: ICD-10-CM

## 2024-05-30 DIAGNOSIS — R25.1 TREMOR, UNSPECIFIED: ICD-10-CM

## 2024-05-30 DIAGNOSIS — Z95.1 PRESENCE OF AORTOCORONARY BYPASS GRAFT: Chronic | ICD-10-CM

## 2024-05-30 PROCEDURE — G0463: CPT

## 2024-06-13 PROBLEM — R25.1 TREMORS OF NERVOUS SYSTEM: Status: ACTIVE | Noted: 2023-07-12

## 2024-06-13 NOTE — PHYSICAL THERAPY INITIAL EVALUATION ADULT - WEIGHT-BEARING RESTRICTIONS: STAND/SIT, REHAB EVAL
Per PDMP, Lorazepam 2mg last sold on 06/07/24 for a 30 day supply.     New Rx for Lorazepam 2mg written on 06/05/24.     No further communication is needed at this time. Closing encounter.    full weight-bearing

## 2024-06-13 NOTE — DISCUSSION/SUMMARY
[FreeTextEntry1] : 74 yo male w/ history of Stroke, pAFib on Eliquis, COPD, CAD s/p PCI w/ stent, CABG 2014, HF w/ PeF, 2nd degree heart block, s/p PPM, HTN, DM, CKD Stage 3, cardiac arrest ROSC after 5 minutes, Action tremor on primidone here for Tele Health Visit. Spoke with son as well as patient over the phone. Patient was recently discharged from the hospital for aspiration pneumonia. He was using his bipap and vomited. Son reports patient is lethargic on primidone over 500 mg daily, although tremors mostly resolved. At 400 mg total daily dose resting tremors resolved, lethargy resolved, however does have action tremor. No exam due telehealth. Discussed with patient and son, no real alternative to primidone to help with tremor. Can increase morning and afternoon dose of primidone to 150 mg during the day to prevent sedation and decrease night dose to 100 mg to keep total at 400 mg daily.   Impression: Tremors due to hypoxic respiratory failure   Recommendation:  [] Change dosage to Primidone 150 mg/150mg/100 mg for total 400 mg daily dose  [] RTC in 3 months  Discussed with Dr. Myers attending.

## 2024-06-13 NOTE — HISTORY OF PRESENT ILLNESS
[FreeTextEntry1] : Tele Health over phone 74 yo male w/ history of Stroke, pAFib on eliquis, COPD, CAD s/p PCI w/ stent, CABG 2014, HF w/ PeF, 2nd degree heart block, s/p PPM, HTN, DM, CKD Stage 3, cardiac arrest ROSC after 5 minutes, Action tremor on primidone here for Tele Health Visit. Spoke with son as well as patient over the phone. Patient was recently discharged from the hospital for aspiration pneumonia. He was using his bipap and vomited. Son reports patient is lethargic on primidone over 500 mg daily, although tremors mostly resolved. After being admitted to the hospital, primidone was held due to risk of aspiration. It was slowly restarted and is currently on 400 mg daily total (takes 100 AM/100 afternoon/200 mg at night). At 400 mg total daily dose resting tremors resolved, lethargy resolved, however does have action tremor. Anytime he tries moving he starts to shake and this prevents him from participating with physical therapy.

## 2024-06-21 ENCOUNTER — APPOINTMENT (OUTPATIENT)
Dept: PULMONOLOGY | Facility: CLINIC | Age: 74
End: 2024-06-21

## 2024-07-05 ENCOUNTER — EMERGENCY (EMERGENCY)
Facility: HOSPITAL | Age: 74
LOS: 0 days | Discharge: HOME | End: 2024-07-05
Attending: EMERGENCY MEDICINE
Payer: MEDICAID

## 2024-07-05 VITALS
TEMPERATURE: 98 F | SYSTOLIC BLOOD PRESSURE: 141 MMHG | DIASTOLIC BLOOD PRESSURE: 76 MMHG | HEART RATE: 49 BPM | OXYGEN SATURATION: 99 % | RESPIRATION RATE: 49 BRPM | WEIGHT: 181.22 LBS

## 2024-07-05 VITALS
SYSTOLIC BLOOD PRESSURE: 148 MMHG | OXYGEN SATURATION: 98 % | TEMPERATURE: 97 F | RESPIRATION RATE: 18 BRPM | DIASTOLIC BLOOD PRESSURE: 76 MMHG | HEART RATE: 59 BPM

## 2024-07-05 DIAGNOSIS — R47.81 SLURRED SPEECH: ICD-10-CM

## 2024-07-05 DIAGNOSIS — Z86.73 PERSONAL HISTORY OF TRANSIENT ISCHEMIC ATTACK (TIA), AND CEREBRAL INFARCTION WITHOUT RESIDUAL DEFICITS: ICD-10-CM

## 2024-07-05 DIAGNOSIS — I45.10 UNSPECIFIED RIGHT BUNDLE-BRANCH BLOCK: ICD-10-CM

## 2024-07-05 DIAGNOSIS — Z95.1 PRESENCE OF AORTOCORONARY BYPASS GRAFT: Chronic | ICD-10-CM

## 2024-07-05 DIAGNOSIS — Z87.891 PERSONAL HISTORY OF NICOTINE DEPENDENCE: ICD-10-CM

## 2024-07-05 DIAGNOSIS — R07.89 OTHER CHEST PAIN: ICD-10-CM

## 2024-07-05 DIAGNOSIS — Z95.0 PRESENCE OF CARDIAC PACEMAKER: ICD-10-CM

## 2024-07-05 DIAGNOSIS — R00.1 BRADYCARDIA, UNSPECIFIED: ICD-10-CM

## 2024-07-05 LAB
ALBUMIN SERPL ELPH-MCNC: 2.9 G/DL — LOW (ref 3.3–5)
ALP SERPL-CCNC: 59 U/L — SIGNIFICANT CHANGE UP (ref 40–120)
ALT FLD-CCNC: 19 U/L — SIGNIFICANT CHANGE UP (ref 12–78)
ANION GAP SERPL CALC-SCNC: 5 MMOL/L — SIGNIFICANT CHANGE UP (ref 5–17)
APTT BLD: 30.6 SEC — SIGNIFICANT CHANGE UP (ref 24.5–35.6)
AST SERPL-CCNC: 21 U/L — SIGNIFICANT CHANGE UP (ref 15–37)
BASOPHILS # BLD AUTO: 0.08 K/UL — SIGNIFICANT CHANGE UP (ref 0–0.2)
BASOPHILS NFR BLD AUTO: 0.9 % — SIGNIFICANT CHANGE UP (ref 0–2)
BILIRUB SERPL-MCNC: 0.3 MG/DL — SIGNIFICANT CHANGE UP (ref 0.2–1.2)
BUN SERPL-MCNC: 35 MG/DL — HIGH (ref 7–23)
CALCIUM SERPL-MCNC: 8.8 MG/DL — SIGNIFICANT CHANGE UP (ref 8.5–10.1)
CHLORIDE SERPL-SCNC: 102 MMOL/L — SIGNIFICANT CHANGE UP (ref 96–108)
CK MB BLD-MCNC: <1.9 % — SIGNIFICANT CHANGE UP (ref 0–3.5)
CK MB CFR SERPL CALC: <1 NG/ML — SIGNIFICANT CHANGE UP (ref 0.5–3.6)
CK SERPL-CCNC: 53 U/L — SIGNIFICANT CHANGE UP (ref 26–308)
CO2 SERPL-SCNC: 29 MMOL/L — SIGNIFICANT CHANGE UP (ref 22–31)
CREAT SERPL-MCNC: 0.81 MG/DL — SIGNIFICANT CHANGE UP (ref 0.5–1.3)
EGFR: 92 ML/MIN/1.73M2 — SIGNIFICANT CHANGE UP
EOSINOPHIL # BLD AUTO: 0.54 K/UL — HIGH (ref 0–0.5)
EOSINOPHIL NFR BLD AUTO: 6.4 % — HIGH (ref 0–6)
GLUCOSE SERPL-MCNC: 100 MG/DL — HIGH (ref 70–99)
HCT VFR BLD CALC: 42.1 % — SIGNIFICANT CHANGE UP (ref 39–50)
HGB BLD-MCNC: 13.8 G/DL — SIGNIFICANT CHANGE UP (ref 13–17)
IMM GRANULOCYTES NFR BLD AUTO: 0.2 % — SIGNIFICANT CHANGE UP (ref 0–0.9)
INR BLD: 0.9 RATIO — SIGNIFICANT CHANGE UP (ref 0.85–1.18)
LYMPHOCYTES # BLD AUTO: 3.11 K/UL — SIGNIFICANT CHANGE UP (ref 1–3.3)
LYMPHOCYTES # BLD AUTO: 36.8 % — SIGNIFICANT CHANGE UP (ref 13–44)
MAGNESIUM SERPL-MCNC: 2.3 MG/DL — SIGNIFICANT CHANGE UP (ref 1.6–2.6)
MCHC RBC-ENTMCNC: 30.7 PG — SIGNIFICANT CHANGE UP (ref 27–34)
MCHC RBC-ENTMCNC: 32.8 G/DL — SIGNIFICANT CHANGE UP (ref 32–36)
MCV RBC AUTO: 93.8 FL — SIGNIFICANT CHANGE UP (ref 80–100)
MONOCYTES # BLD AUTO: 0.69 K/UL — SIGNIFICANT CHANGE UP (ref 0–0.9)
MONOCYTES NFR BLD AUTO: 8.2 % — SIGNIFICANT CHANGE UP (ref 2–14)
NEUTROPHILS # BLD AUTO: 4 K/UL — SIGNIFICANT CHANGE UP (ref 1.8–7.4)
NEUTROPHILS NFR BLD AUTO: 47.5 % — SIGNIFICANT CHANGE UP (ref 43–77)
NRBC # BLD: 0 /100 WBCS — SIGNIFICANT CHANGE UP (ref 0–0)
PLATELET # BLD AUTO: 204 K/UL — SIGNIFICANT CHANGE UP (ref 150–400)
POTASSIUM SERPL-MCNC: 4.6 MMOL/L — SIGNIFICANT CHANGE UP (ref 3.5–5.3)
POTASSIUM SERPL-SCNC: 4.6 MMOL/L — SIGNIFICANT CHANGE UP (ref 3.5–5.3)
PROT SERPL-MCNC: 7.3 GM/DL — SIGNIFICANT CHANGE UP (ref 6–8.3)
PROTHROM AB SERPL-ACNC: 10.8 SEC — SIGNIFICANT CHANGE UP (ref 9.5–13)
RBC # BLD: 4.49 M/UL — SIGNIFICANT CHANGE UP (ref 4.2–5.8)
RBC # FLD: 15 % — HIGH (ref 10.3–14.5)
SODIUM SERPL-SCNC: 136 MMOL/L — SIGNIFICANT CHANGE UP (ref 135–145)
TROPONIN I, HIGH SENSITIVITY RESULT: 21.1 NG/L — SIGNIFICANT CHANGE UP
TROPONIN I, HIGH SENSITIVITY RESULT: 21.2 NG/L — SIGNIFICANT CHANGE UP
WBC # BLD: 8.44 K/UL — SIGNIFICANT CHANGE UP (ref 3.8–10.5)
WBC # FLD AUTO: 8.44 K/UL — SIGNIFICANT CHANGE UP (ref 3.8–10.5)

## 2024-07-05 PROCEDURE — 93010 ELECTROCARDIOGRAM REPORT: CPT

## 2024-07-05 PROCEDURE — 71045 X-RAY EXAM CHEST 1 VIEW: CPT | Mod: 26

## 2024-07-05 PROCEDURE — 99285 EMERGENCY DEPT VISIT HI MDM: CPT

## 2024-07-11 ENCOUNTER — NON-APPOINTMENT (OUTPATIENT)
Age: 74
End: 2024-07-11

## 2024-07-11 ENCOUNTER — APPOINTMENT (OUTPATIENT)
Dept: ELECTROPHYSIOLOGY | Facility: CLINIC | Age: 74
End: 2024-07-11
Payer: MEDICAID

## 2024-07-11 PROCEDURE — 93294 REM INTERROG EVL PM/LDLS PM: CPT

## 2024-07-11 PROCEDURE — 93296 REM INTERROG EVL PM/IDS: CPT | Mod: NC

## 2024-07-29 ENCOUNTER — APPOINTMENT (OUTPATIENT)
Dept: CARDIOLOGY | Facility: CLINIC | Age: 74
End: 2024-07-29
Payer: MEDICAID

## 2024-07-29 ENCOUNTER — NON-APPOINTMENT (OUTPATIENT)
Age: 74
End: 2024-07-29

## 2024-07-29 VITALS
TEMPERATURE: 99.3 F | HEART RATE: 58 BPM | OXYGEN SATURATION: 96 % | SYSTOLIC BLOOD PRESSURE: 121 MMHG | DIASTOLIC BLOOD PRESSURE: 73 MMHG

## 2024-07-29 DIAGNOSIS — I48.0 PAROXYSMAL ATRIAL FIBRILLATION: ICD-10-CM

## 2024-07-29 DIAGNOSIS — E78.5 HYPERLIPIDEMIA, UNSPECIFIED: ICD-10-CM

## 2024-07-29 DIAGNOSIS — I50.30 UNSPECIFIED DIASTOLIC (CONGESTIVE) HEART FAILURE: ICD-10-CM

## 2024-07-29 DIAGNOSIS — I10 ESSENTIAL (PRIMARY) HYPERTENSION: ICD-10-CM

## 2024-07-29 PROCEDURE — 99215 OFFICE O/P EST HI 40 MIN: CPT

## 2024-07-29 PROCEDURE — G2211 COMPLEX E/M VISIT ADD ON: CPT | Mod: NC,1L

## 2024-07-29 PROCEDURE — 93000 ELECTROCARDIOGRAM COMPLETE: CPT

## 2024-07-29 NOTE — DISCUSSION/SUMMARY
[FreeTextEntry1] : 73M w CAD/CABG 2014, HFpEF hx of cva htn hld presents for f/u  1. HFpEF -grossly euvolemic -cont gdmt for HFpEF as bp tolerates (unable to tolerate entresto) -cont bb, lasix, entresto  2. PPM -following with device clinic at [facility], at last appt, ppm found to functioning properly.  -routine device clinic follow up  f/u 4 months 40 min spent on complete encounter [EKG obtained to assist in diagnosis and management of assessed problem(s)] : EKG obtained to assist in diagnosis and management of assessed problem(s)

## 2024-07-29 NOTE — PHYSICAL EXAM
[Well Developed] : well developed [Well Nourished] : well nourished [No Acute Distress] : no acute distress [Normal Venous Pressure] : normal venous pressure [Normal S1, S2] : normal S1, S2 [No Murmur] : no murmur [Soft] : abdomen soft [Non Tender] : non-tender [No Edema] : no edema [Moves all extremities] : moves all extremities [Alert and Oriented] : alert and oriented [de-identified] : coarse bs b/l [de-identified] : in wheelchair.

## 2024-07-29 NOTE — HISTORY OF PRESENT ILLNESS
[FreeTextEntry1] : 73M w CAD/CABG 2014, HFpEF hx of cva htn hld presents for f/u Sent in by: VINICIUS PMD:  Previously, pt seen 9/23 for an initial eval. pt with prolonged inpt course SHAYNE 11/2022, found unresponsive, had a cardiac arrest (per son, son found him at home minimally responsive and diaphoretic. Checked his FS which was in the 30s. Brought him to the ER where he seized and was believed to have VF arrest) with CPR and subsequent ROSC after 5 min. TTE showed Takotsubo cardiomyopathy. EF 50-55%, LVH, mild pHTN. Pt also with tonic clonic seizures post-arrest. s/p trach and PEG on 12/5, course also c/b ATN, shock liver +LUE DVT, tx w/ Eliquis, s/p multiple infections (Enterococcal faecalis cellulitis on 12/12, infected left hand hematoma status post I&D by plastics on 12/13, tracheobronchitis, sacral decubs s/p debridement. tracheostomy removed 3/7. PEG removed. pts son states they tried to introduce entresto and pt became very hypotensive and dizzy and it was stopped. pt currently lives in [facility] rehab. at last visit, pt feeling ok. euvolemic on exam. pt with PPM, sent to device clinic. Device was checked, with normal BiV PPM functioning. cont a/c. 1/24, feeling well. last seen 5/24, via telehealth per pt request. pt had an aspiration event, required inpt hospitalization, now feeling better. entresto started.   pt now present for outpatient cards care. today,  pt feeling ok, in wheelchair. currently comfortable appearing, states occasional CP symptoms, at rest. can last for a few seconds and then spontaneously resolves. no assoc sob.  Denies palpitations, diaphoresis, syncope, LE edema, orthopnea. no dizziness, no syncope, no recent falls[facility]  per son, pt mostly in wheelchair.    pt family agree with medical mgmt of cp and anginal symptoms  Exercise: physical therapy Diet: none  Prev cardiac history: see above Previous cardiac testing: Recent labs:  EKG: SR RBBB  Med hx: CAD/CABG 2014, HFpEF hx of cva htn hld Sx hx: none Family hx: no known cardiac hx Social hx: lives in [facility] rehab. tob/etoh/drugs Meds: asa 81 entresto 26-26 lopressor 25 lipitor 80 lasix 20 po jardiance eliquis 5 keppra primidone valproic doxazosin sertraline Allergies: nkda

## 2024-07-29 NOTE — PHYSICAL EXAM
[Well Developed] : well developed [Well Nourished] : well nourished [No Acute Distress] : no acute distress [Normal Venous Pressure] : normal venous pressure [Normal S1, S2] : normal S1, S2 [No Murmur] : no murmur [Soft] : abdomen soft [Non Tender] : non-tender [No Edema] : no edema [Moves all extremities] : moves all extremities [Alert and Oriented] : alert and oriented [de-identified] : coarse bs b/l [de-identified] : in wheelchair.

## 2024-07-29 NOTE — DISCUSSION/SUMMARY
[FreeTextEntry1] : 73M w CAD/CABG 2014, HFpEF hx of cva htn hld presents for f/u  1. HFpEF -grossly euvolemic -cont gdmt for HFpEF as bp tolerates (unable to tolerate entresto) -cont bb, lasix, entresto  2. PPM -following with device clinic at [facility], at last appt, ppm found to functioning properly.  -routine device clinic follow up  f/u 4 months 40 min spent on complete encounter [EKG obtained to assist in diagnosis and management of assessed problem(s)] : EKG obtained to assist in diagnosis and management of assessed problem(s)

## 2024-08-29 ENCOUNTER — OUTPATIENT (OUTPATIENT)
Dept: OUTPATIENT SERVICES | Facility: HOSPITAL | Age: 74
LOS: 1 days | End: 2024-08-29
Payer: MEDICAID

## 2024-08-29 ENCOUNTER — APPOINTMENT (OUTPATIENT)
Dept: NEUROLOGY | Facility: HOSPITAL | Age: 74
End: 2024-08-29

## 2024-08-29 VITALS
WEIGHT: 182.98 LBS | OXYGEN SATURATION: 98 % | TEMPERATURE: 97.5 F | SYSTOLIC BLOOD PRESSURE: 132 MMHG | RESPIRATION RATE: 14 BRPM | HEIGHT: 69 IN | DIASTOLIC BLOOD PRESSURE: 79 MMHG | BODY MASS INDEX: 27.1 KG/M2 | HEART RATE: 68 BPM

## 2024-08-29 DIAGNOSIS — R25.1 TREMOR, UNSPECIFIED: ICD-10-CM

## 2024-08-29 DIAGNOSIS — R56.9 UNSPECIFIED CONVULSIONS: ICD-10-CM

## 2024-08-29 DIAGNOSIS — Z95.1 PRESENCE OF AORTOCORONARY BYPASS GRAFT: Chronic | ICD-10-CM

## 2024-08-29 PROCEDURE — G0463: CPT

## 2024-08-29 RX ORDER — SERTRALINE 25 MG/1
25 TABLET, FILM COATED ORAL
Refills: 0 | Status: ACTIVE | COMMUNITY

## 2024-08-29 RX ORDER — VALPROIC ACID 250 MG/1
250 CAPSULE, LIQUID FILLED ORAL
Refills: 0 | Status: ACTIVE | COMMUNITY

## 2024-08-29 RX ORDER — LEVETIRACETAM 100 MG/ML
100 SOLUTION ORAL
Refills: 0 | Status: ACTIVE | COMMUNITY

## 2024-08-29 RX ORDER — PRIMIDONE 50 MG/1
50 TABLET ORAL
Refills: 0 | Status: ACTIVE | COMMUNITY
Start: 2024-08-29

## 2024-08-30 DIAGNOSIS — R25.1 TREMOR, UNSPECIFIED: ICD-10-CM

## 2024-08-30 NOTE — ASSESSMENT
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 Neurological exam reveals action tremor exacerbated by trunk flexion and hip flexion. Vitals wnl.   PLAN: - Will increase primidone to 200 mg in AM, will keep 150 mg in afternoon and 100 mg in evening.  - If patient does not tolerate the increase in primidone, will consider clonazepam 0.25 mg qd - If patient tolerates clonazepam, can increase to 0.25 mg BID. - If patient does not tolerate clonazepam, will consider gabapentin - Cannot trial propranolol due to history of asthma - Follow up in 1-2 months

## 2024-08-30 NOTE — HISTORY OF PRESENT ILLNESS
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 Patient is now taking 150/150/100 of primidone for a total of 400mg per day. Son at bedside notes that patient does not have any tremors at rest but as soon as he starts to move, his entire body starts shaking. When patient was on 550-600 mg of primidone, family noted improvement in patient's voice tremor (patient was easy to understand) and did not have action tremor. However, patient was lethargic so family is hesitant to increase primidone dose back to 550-600 mg. In addition, patient had aspiration pneumonia in June. Has been holding off on physical therapy due to tremor fort the past 6 months.   6/13/2024: Patient was recently discharged from the hospital for aspiration pneumonia. He was using his bipap and vomited. Son reports patient is lethargic on primidone over 500 mg daily, although tremors mostly resolved. After being admitted to the hospital, primidone was held due to risk of aspiration. It was slowly restarted and is currently on 400 mg daily total (takes 100 AM/100 afternoon/200 mg at night). At 400 mg total daily dose resting tremors resolved, lethargy resolved, however does have action tremor. Anytime he tries moving he starts to shake and this prevents him from participating with physical therapy.  4/11/2024: Patient had been increasing his primidone dose though began to feel significant lethargy at dosages 650/ day and above. He feels that these doses helped significantly with his tremors and voice though the lethargy caused him to sleep most of the day.  2/29/24: he continues to have truncal/extremity tremors associated with actions (ex. holding arms up). Increasing the primidone has not helped. His memory is clear. He is severely dysarthric, but no word finding difficulties. No changes to medications in interim. No visual changes or headaches. Son is accompanying him today and occasionally translates to [place].   1/18/2024:  Patient seen and examined seated in wheelchair, having difficult propping himself up due to action induced myoclonus. Patient presents with son at bedside to aid with communication due to patient's expressive aphasia. Patient is able to follow command and express understanding without issue. Patient reports no change in his myoclonus with the dose change in primidone. He additionally reports a a dull burning pain in his whole LUE with new  weakness. He reports the LUE has had some mild weakness since his cardiac arrest hospitalization in 11/22 though it did not effect his  till the last month or so. LVA level 31.3, VPA level 45. Patient denies headache, nausea, vomiting, dizziness, hearing changes, vision changes or new speech changes.  11/30/2023:  Rita Hamilton is a 73-year-old RIGHT-handed man who presents to Movement Disorders clinic with c/o tremor. Patient previously seen on 8/24/2023.  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 Previously trialed on clonazepam, which patient found too sedating. Was then started on primidone prior to his previous Neurology encounter in August 2023. Was continued on primidone 100mg BID at that time. Patient's son reports that patient has not had any improvement in his tremors with primidone. However, patient is otherwise doing well. His tracheostomy and PEG tube were both reversed after hospitalization. Patient has been cleared for a solid food diet - he feeds himself. Will not have tremor when he is relaxed and feeding himself. However, if he is given a command such as moving his legs - he will experience a tremor when performing (whole body will shake). Currently living at [facility] but calls son himself each day. Mood has been stable. He was working with PT, but this has apparently been stopped for 3 months because of patient's tremors. Patient remains bedbound and requires a wheelchair for ambulation.  Patient remains with severe dysarthria. Also has a voice tremor. However, patient's son is able to understand him (patient tells me only his son can understand him). He understands the circumstances of his current medical issues. Patient does not currently use tobacco, alcohol, or recreational drugs. Previously worked as a , but has not worked in about 4 years.

## 2024-08-30 NOTE — REVIEW OF SYSTEMS
[Joint Pain] : joint pain [Fever] : no fever [Chills] : no chills [Facial Weakness] : no facial weakness [Arm Weakness] : no arm weakness [Hand Weakness] : no hand weakness [Leg Weakness] : no leg weakness [Seizures] : no convulsions [Dizziness] : no dizziness [Lightheadedness] : no lightheadedness [Cluster Headache] : no cluster headache [Migraine Headache] : no migraine headache [Tension Headache] : no tension-type headache [Difficulty Walking] : no difficulty walking [Chest Pain] : no chest pain [Shortness Of Breath] : no shortness of breath [Cough] : no cough

## 2024-08-30 NOTE — HISTORY OF PRESENT ILLNESS
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 Patient is now taking 150/150/100 of primidone for a total of 400mg per day. Son at bedside notes that patient does not have any tremors at rest but as soon as he starts to move, his entire body starts shaking. When patient was on 550-600 mg of primidone, family noted improvement in patient's voice tremor (patient was easy to understand) and did not have action tremor. However, patient was lethargic so family is hesitant to increase primidone dose back to 550-600 mg. In addition, patient had aspiration pneumonia in June. Has been holding off on physical therapy due to tremor fort the past 6 months.   6/13/2024: Patient was recently discharged from the hospital for aspiration pneumonia. He was using his bipap and vomited. Son reports patient is lethargic on primidone over 500 mg daily, although tremors mostly resolved. After being admitted to the hospital, primidone was held due to risk of aspiration. It was slowly restarted and is currently on 400 mg daily total (takes 100 AM/100 afternoon/200 mg at night). At 400 mg total daily dose resting tremors resolved, lethargy resolved, however does have action tremor. Anytime he tries moving he starts to shake and this prevents him from participating with physical therapy.  4/11/2024: Patient had been increasing his primidone dose though began to feel significant lethargy at dosages 650/ day and above. He feels that these doses helped significantly with his tremors and voice though the lethargy caused him to sleep most of the day.  2/29/24: he continues to have truncal/extremity tremors associated with actions (ex. holding arms up). Increasing the primidone has not helped. His memory is clear. He is severely dysarthric, but no word finding difficulties. No changes to medications in interim. No visual changes or headaches. Son is accompanying him today and occasionally translates to [place].   1/18/2024:  Patient seen and examined seated in wheelchair, having difficult propping himself up due to action induced myoclonus. Patient presents with son at bedside to aid with communication due to patient's expressive aphasia. Patient is able to follow command and express understanding without issue. Patient reports no change in his myoclonus with the dose change in primidone. He additionally reports a a dull burning pain in his whole LUE with new  weakness. He reports the LUE has had some mild weakness since his cardiac arrest hospitalization in 11/22 though it did not effect his  till the last month or so. LVA level 31.3, VPA level 45. Patient denies headache, nausea, vomiting, dizziness, hearing changes, vision changes or new speech changes.  11/30/2023:  Rita Hamilton is a 73-year-old RIGHT-handed man who presents to Movement Disorders clinic with c/o tremor. Patient previously seen on 8/24/2023.  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 Previously trialed on clonazepam, which patient found too sedating. Was then started on primidone prior to his previous Neurology encounter in August 2023. Was continued on primidone 100mg BID at that time. Patient's son reports that patient has not had any improvement in his tremors with primidone. However, patient is otherwise doing well. His tracheostomy and PEG tube were both reversed after hospitalization. Patient has been cleared for a solid food diet - he feeds himself. Will not have tremor when he is relaxed and feeding himself. However, if he is given a command such as moving his legs - he will experience a tremor when performing (whole body will shake). Currently living at [facility] but calls son himself each day. Mood has been stable. He was working with PT, but this has apparently been stopped for 3 months because of patient's tremors. Patient remains bedbound and requires a wheelchair for ambulation.  Patient remains with severe dysarthria. Also has a voice tremor. However, patient's son is able to understand him (patient tells me only his son can understand him). He understands the circumstances of his current medical issues. Patient does not currently use tobacco, alcohol, or recreational drugs. Previously worked as a , but has not worked in about 4 years.

## 2024-08-30 NOTE — PHYSICAL EXAM
[General Appearance - Alert] : alert [General Appearance - In No Acute Distress] : in no acute distress [Person] : oriented to person [Place] : oriented to place [Time] : oriented to time [Naming Objects] : no difficulty naming common objects [Cranial Nerves Optic (II)] : visual acuity intact bilaterally,  visual fields full to confrontation, pupils equal round and reactive to light [Cranial Nerves Oculomotor (III)] : extraocular motion intact [Cranial Nerves Trigeminal (V)] : facial sensation intact symmetrically [Cranial Nerves Facial (VII)] : face symmetrical [Cranial Nerves Vestibulocochlear (VIII)] : hearing was intact bilaterally [Cranial Nerves Glossopharyngeal (IX)] : tongue and palate midline [Cranial Nerves Hypoglossal (XII)] : there was no tongue deviation with protrusion [Motor Handedness Right-Handed] : the patient is right hand dominant [Sensation Tactile Decrease] : light touch was intact [1+] : Ankle jerk left 1+ [FreeTextEntry6] : Action tremor L>R on finger to nose testing Strength 4/5 throughout, limited due to patient participation Low amplitude tremor noted when patient flexes at hip (leans forward) as well as when patient does hip flexion

## 2024-10-09 NOTE — ED ADULT TRIAGE NOTE - ACCOMPANIED BY
Patient noted to have a percutaneous endoscopic gastrostomy tube in place. I have personally inspected the tube.Tube was placed prior to this admission There are no signs of drainage or infection around the site. The tube is patent. Medications have converted to liquid form if available.  Routine care to be done by wound care and nursing staff.     Episode of emesis on PM of 10/7 following surgery with concern for possible aspiration event. TF's subsequently held.   CXR 10/7 with bibasilar L>R likely atelectasis, unable to exclude minimal infiltrate or aspiration on left lung base.    - Restart TF's at trickle on 10/8 and gradually increase, monitor for tolerance and residuals. Tolerating well as of 10/9  - RD consulted, TF regimen per recs  - Monitor for s/s of aspiration or PNA. Afebrile.   - Cardiac monitoring, continuous pulse ox  - Repeat CXR 10/9  -  consulted, appreciate recs     Self

## 2024-10-10 ENCOUNTER — NON-APPOINTMENT (OUTPATIENT)
Age: 74
End: 2024-10-10

## 2024-10-10 ENCOUNTER — APPOINTMENT (OUTPATIENT)
Dept: ELECTROPHYSIOLOGY | Facility: CLINIC | Age: 74
End: 2024-10-10
Payer: MEDICAID

## 2024-10-10 PROCEDURE — 93294 REM INTERROG EVL PM/LDLS PM: CPT

## 2024-10-10 PROCEDURE — 93296 REM INTERROG EVL PM/IDS: CPT | Mod: NC

## 2024-10-29 ENCOUNTER — APPOINTMENT (OUTPATIENT)
Dept: CARDIOLOGY | Facility: CLINIC | Age: 74
End: 2024-10-29
Payer: MEDICAID

## 2024-10-29 DIAGNOSIS — I50.30 UNSPECIFIED DIASTOLIC (CONGESTIVE) HEART FAILURE: ICD-10-CM

## 2024-10-29 DIAGNOSIS — I48.0 PAROXYSMAL ATRIAL FIBRILLATION: ICD-10-CM

## 2024-10-29 DIAGNOSIS — I10 ESSENTIAL (PRIMARY) HYPERTENSION: ICD-10-CM

## 2024-10-29 DIAGNOSIS — E78.5 HYPERLIPIDEMIA, UNSPECIFIED: ICD-10-CM

## 2024-10-29 PROCEDURE — 99215 OFFICE O/P EST HI 40 MIN: CPT | Mod: 95

## 2024-10-31 ENCOUNTER — OUTPATIENT (OUTPATIENT)
Dept: OUTPATIENT SERVICES | Facility: HOSPITAL | Age: 74
LOS: 1 days | End: 2024-10-31
Payer: MEDICAID

## 2024-10-31 ENCOUNTER — APPOINTMENT (OUTPATIENT)
Dept: NEUROLOGY | Facility: HOSPITAL | Age: 74
End: 2024-10-31

## 2024-10-31 VITALS
HEIGHT: 65 IN | RESPIRATION RATE: 18 BRPM | DIASTOLIC BLOOD PRESSURE: 74 MMHG | OXYGEN SATURATION: 95 % | HEART RATE: 61 BPM | WEIGHT: 194.01 LBS | SYSTOLIC BLOOD PRESSURE: 134 MMHG | BODY MASS INDEX: 32.32 KG/M2 | TEMPERATURE: 99.9 F

## 2024-10-31 DIAGNOSIS — R25.1 TREMOR, UNSPECIFIED: ICD-10-CM

## 2024-10-31 DIAGNOSIS — R56.9 UNSPECIFIED CONVULSIONS: ICD-10-CM

## 2024-10-31 DIAGNOSIS — Z95.1 PRESENCE OF AORTOCORONARY BYPASS GRAFT: Chronic | ICD-10-CM

## 2024-10-31 PROCEDURE — G0463: CPT

## 2024-10-31 RX ORDER — GABAPENTIN 100 MG/1
100 CAPSULE ORAL
Refills: 0 | Status: ACTIVE | COMMUNITY
Start: 2024-10-31

## 2024-11-18 NOTE — DIETITIAN INITIAL EVALUATION ADULT - PERTINENT LABORATORY DATA
24    Dear Dr Genao    Thank you for referring your patient, Bernarda Lay for MFM consultation. She was referred because of the following conditions:    Pre GDM  Obesity  PCOS  Fibroid  Previa - RESOLVED      She is a 31 year old  female .  Patient's last menstrual period was 2024 (approximate).  Her EDC is 2/10/2025.  She is currently 28w0d and is doing well. Reports +FM. Notes occ cramps - none currently.  Today, she denies signs of PPROM,  labor, vaginal bleeding.      I have reviewed Bernarda's previous OB ultrasound reports, pertinent prenatal labwork and testing provided by her referring OB provider.     PMH:   Past Medical History:   Diagnosis Date    Diabetes mellitus  (CMD)     PCOS (polycystic ovarian syndrome)     Round ligament pain      OB:  OB History    Para Term  AB Living   3 2 2 0 0 2   SAB IAB Ectopic Molar Multiple Live Births   0 0 0 0 0 2     Family History:  Family History   Problem Relation Age of Onset    Diabetes Mother     Hypertension Mother     Patient is unaware of any medical problems Father     Hypertension Sister      Social history:   Social History     Social History Narrative    Not on file      Meds:  No outpatient medications have been marked as taking for the 24 encounter (Office Visit) with Sarah Mccormack MD.     ALLERGIES:  No Known Allergies    Review of systems  Constitutional: Denies fatigue, excessive weakness, fever, or chills, nausea , vomiting  HEENT: Denies sore throat, visual changes, or sinus drainage  Cardiovascular: Denies chest pain or palpitations  Respiratory: Denies shortness of breath, cough, or wheezing  GI: No abdominal pain/tenderness/cramping/contractions  vaginal bleeding: none  vaginal leaking of fluid: none       all other systems negative unless noted in the HPI      Visit Vitals  /76   Pulse 99   LMP 2024 (Approximate)        Physical Examination:  General: Alert and  oriented x3, pleasant  HEENT: no visualized thyromegaly  Cardiovascular: no lower extremity edema  Resp: non-labored breathing  Abdomen: soft, gravid, nontender to palpation   Psychiatric: Mood and affect: Alert and awake, interactive and mood/affect were appropriate  Skin: no rashes    Ultrasound performed today :    Type of gestation: Turcios         Breech presentation  Anterior placenta   MVP normal    BPP      AGA fetus - efw 64%, ac 59%    OF NOTE: no fibroid visualized on today's US    Pertinent Labs:  cfDNA: LR  MSAFP MOM: 0.67  TSH : 1.4    24 - hgba1c 7.6%  10/4/24 - hgba1c 5.4% - pt congratulated     Lab Results:  Lab Results   Component Value Date    HGB 12.4 2024    HGB 13.9 2014     2024    RPR Nonreactive 2024    RPR non reactive 2023    HGBA1C 5.4 10/04/2024    TSH 1.451 2014       Glucose Log reviewed - majority wnl   Strongly enc ADA diet, exercise and strongly encouraged adherence  Enc increasing activity after Dinner to help reduce mild  hyperglycemia (125, 125, 130)  Continue insulin w/o adjustment        COUNSELING  T2D/Obesity  Reviewed risks of poorly controlled dm & obesity in preg    Reviewed importance of tight glucose control as poorly controlled diabetes in pregnancy is associated with increased risks of first-trimester miscarriage, congenital malformations (especially cardiac defects and CNS anomalies), fetal death,  birth, pre-eclampsia, ketoacidosis, polyhydramnios, macrosomia, operative (both vaginal and ) delivery, birth injury (including brachial plexus), delayed lung maturity, respiratory distress syndrome, jaundice, hypoglycemia, hypocalcemia,  mortality, and long-term consequences for the children such as obesity, type II diabetes, and lower IQ.     Enc healthy nutrition/ADA diet, exercise as tolerated, reviewed total goal wt gain 11-20lbs in preg    Reviewed euglycemic goals (fbs 60-95, 2hr pp B/L/D  <120), call with bs's >180 <60    In case of Diabetic ketoacidosis, this is treated with aggressive hydration and intravenous insulin.     In pre gestational diabetics with good glycemic control, timing of delivery is about 39 0/7-39 6/7 weeks;  delivery may be offered if estimated fetal weight is >=4500 grams    Regarding evaluation for end organ damage from long standing Diabetes, best to assess via Optho exam yearly, baseline TSH, CMP, U PCR, baseline EKG for patients with diabetes for > 10 years or over age 30.       FE wnl per peds cards        Placenta Previa - RESOLVED          Recommendations:   Monitor for preeclampsia    Review Log weekly and add NPH insulin as previsouly planned  if FBS >95 over 3d or > 50% of values above threshold within 7d period.     Repeat fetal growth US with bpp scheduled in 4wks - reeval fibroid, serial thereafter    Ophthal consult    Twice weekly NST, raulkly JUN from 32wks until delivery - P.OB to schedule    Hba1c every 6-8 weeks.      Ideally, delivery after 39wks and by PALOMA, sooner if obstetrically indicated   Intrapartum glycemic goals     After delivery, enc diet, exercise, wt loss, behavior modification to reduce bmi        Thank-you for referring «Bernarda Lay» and for allowing me to participate in her care. If I can be of further assistance to you, please do not hesitate to contact me.         Sarah Mccormack MD  Maternal Fetal Medicine     08-29    135  |  94<L>  |  44<H>  ----------------------------<  332<H>  3.8   |  36<H>  |  1.37<H>    Ca    8.1<L>      29 Aug 2023 07:42  Phos  3.6     08-29  Mg     2.1     08-27    TPro  6.3  /  Alb  2.6<L>  /  TBili  0.3  /  DBili  x   /  AST  18  /  ALT  37  /  AlkPhos  78  08-29  POCT Blood Glucose.: 363 mg/dL <H>(08-29-23 @ 12:29)  A1C with Estimated Average Glucose Result: 7.0 % <H>(08-28-23 @ 06:47)  A1C with Estimated Average Glucose Result: 6.9 % <H>(04-05-23 @ 05:30)  A1C with Estimated Average Glucose Result: 5.9 % (01-22-23 @ 06:42)

## 2024-12-03 ENCOUNTER — OUTPATIENT (OUTPATIENT)
Dept: OUTPATIENT SERVICES | Facility: HOSPITAL | Age: 74
LOS: 1 days | Discharge: HOME | End: 2024-12-03
Payer: MEDICAID

## 2024-12-03 DIAGNOSIS — Z95.1 PRESENCE OF AORTOCORONARY BYPASS GRAFT: Chronic | ICD-10-CM

## 2024-12-03 DIAGNOSIS — R91.8 OTHER NONSPECIFIC ABNORMAL FINDING OF LUNG FIELD: ICD-10-CM

## 2024-12-03 PROCEDURE — 71250 CT THORAX DX C-: CPT | Mod: 26

## 2025-01-09 ENCOUNTER — NON-APPOINTMENT (OUTPATIENT)
Age: 75
End: 2025-01-09

## 2025-01-09 ENCOUNTER — APPOINTMENT (OUTPATIENT)
Dept: ELECTROPHYSIOLOGY | Facility: CLINIC | Age: 75
End: 2025-01-09
Payer: MEDICAID

## 2025-01-09 PROCEDURE — 93294 REM INTERROG EVL PM/LDLS PM: CPT

## 2025-01-09 PROCEDURE — 93296 REM INTERROG EVL PM/IDS: CPT | Mod: NC

## 2025-02-06 NOTE — ED ADULT TRIAGE NOTE - CCCP TRG CHIEF CMPLNT
Refill Routing Note   Medication(s) are not appropriate for processing by [facility] for the following reason(s):        Patient not seen by provider within 15 months  Required vitals outdated    [facility] action(s):  Defer               Appointments  past 12m or future 3m with PCP    Date Provider   Last Visit   10/6/2023 Charlene Sin MD   Next Visit   Visit date not found Charlene Sin MD   ED visits in past 90 days: 0        Note composed:1:58 PM 02/06/2025                
hypoglycemia

## 2025-03-07 NOTE — DISCHARGE NOTE NURSING/CASE MANAGEMENT/SOCIAL WORK - NSPROEXTENSIONSOFSELF_GEN_A_NUR
Anesthesia Pre Eval Note    Anesthesia ROS/Med Hx    Overall Review:  EKG was reviewed     Anesthetic Complication History:    Patient does not have a history of anesthetic complications      Pulmonary Review:  Patient does not have a pulmonary history      Neuro/Psych Review:  Patient does not have a neuro/psych history         Cardiovascular Review:   Exercise tolerance: good (>4 METS)  Positive for hypertension - well controlled  Positive for hyperlipidemia    GI/HEPATIC/RENAL Review:  Patient does not have a GI/hepatic/renalhistory       End/Other Review:  Patient does not have an endo/other history    Additional Results:  EKG:  No results found for this or any previous visit (from the past 4464 hour(s)).    ALLERGIES:   -- Prednisone -- SWELLING   -- Prednisolone -- SWELLING   Last Labs        Component                Value               Date/Time                  WBC                      4.9                 03/03/2025 1501            RBC                      5.41                03/03/2025 1501            HGB                      15.5                03/03/2025 1501            HCT                      47.4                03/03/2025 1501            MCV                      87.6                03/03/2025 1501            MCH                      28.7                03/03/2025 1501            MCHC                     32.7                03/03/2025 1501            RDW-CV                   11.7                03/03/2025 1501            Sodium                   140                 03/03/2025 1501            Potassium                4.0                 03/03/2025 1501            Chloride                 103                 03/03/2025 1501            Carbon Dioxide           29                  03/03/2025 1501            Glucose                  84                  03/03/2025 1501            BUN                      9                   03/03/2025 1501            Creatinine               0.96                03/03/2025  1501            GFR,ESTIMATE             85                  03/03/2025 1501            CALCIUM                  10.0                03/03/2025 1501            PLT                      174                 03/03/2025 1501            PTT                      27                  03/03/2025 1501            INR                      1.3 (H)             03/03/2025 1501        Past Medical History:  07/09/2022: Cerebral infarction (CMD)      Comment:  Left sided residual  No date: Essential (primary) hypertension  No date: High cholesterol  Past Surgical History:  No date: Foot/toes surgery proc unlisted; Right   Prior to Admission medications :  Medication aspirin (ECOTRIN) 81 MG EC tablet, Sig Take 81 mg by mouth daily., Start Date , End Date , Taking? Yes, Authorizing Provider Provider, Outside    Medication atorvastatin (LIPITOR) 40 MG tablet, Sig Take 1 tablet by mouth daily., Start Date 7/16/22, End Date , Taking? Yes, Authorizing Provider Provider, Outside    Medication carvedilol (COREG) 3.125 MG tablet, Sig Take 3.125 mg by mouth daily., Start Date 5/5/23, End Date , Taking? Yes, Authorizing Provider Provider, Outside    Medication amLODIPine (NORVASC) 5 MG tablet, Sig Take 5 mg by mouth daily., Start Date 5/5/23, End Date , Taking? Yes, Authorizing Provider Provider, Outside         Patient Vitals for the past 24 hrs:   BP Temp Temp src Pulse Resp SpO2   03/07/25 1204 (!) 158/98 36.9 °C (98.4 °F) Temporal 82 15 95 %       Social history reviewed:  Social History     Tobacco Use   Smoking Status Former    Types: Cigarettes    Passive exposure: Past   Smokeless Tobacco Never   Tobacco Comments    Quit in 2022        E-Cigarette/Vaping Substances & Devices    E-Cigarette/Vaping Use Never Used     Nicotine No     THC No     CBD No     Flavoring No     Disposable No     Pre-filled or Refillable Cartridge No     Refillable Tank No     Pre-filled Pod No        Social History     Substance and Sexual Activity   Alcohol  Use Never           Relevant Problems   No relevant active problems       Physical Exam     Airway   Mallampati: II  TM Distance: >3 FB  Neck ROM: Full  Neck: Non-tender and Able to place in sniff position  TMJ Mobility: Good    Cardiovascular  Cardiovascular exam normal  Cardio Rhythm: Regular  Cardio Rate: Normal    Head Assessment  Head assessment: Normocephalic and Atraumatic    General Assessment  General Assessment: Alert and oriented and No acute distress    Dental Exam  Dental exam normal  Patient has:  Edentulous    Pulmonary Exam  Pulmonary exam normal  Breath sounds clear to auscultation:  Yes    Abdominal Exam  Abdominal exam normal  Visit Vitals  BP (!) 158/98 (BP Location: RUE - Right upper extremity, Patient Position: Sitting)   Pulse 82   Temp 36.9 °C (98.4 °F) (Temporal)   Resp 15   Ht 6' (1.829 m)   Wt 70.3 kg (155 lb)   SpO2 95%   BMI 21.02 kg/m²        Anesthesia Plan:    ASA Status: 3  Anesthesia Type: General    Induction: Intravenous  Preferred Airway Type: LMA  Maintenance: Inhalational    Post-op Pain Management: Per Surgeon      Checklist  Reviewed: NPO Status, Allergies, Medications, Problem list, Past Med History, Lab Results, Patient Summary, EKG and Beta Blocker Status  Consent/Risks Discussed Statement:  The proposed anesthetic plan, including its risks and benefits, have been discussed with the Patient along with the risks and benefits of alternatives. Questions were encouraged and answered and the patient and/or representative understands and agrees to proceed.        I discussed with the patient (and/or patient's legal representative) the risks and benefits of the proposed anesthesia plan, General, which may include services performed by other anesthesia providers.    Alternative anesthesia plans, if available, were reviewed with the patient (and/or patient's legal representative). Discussion has been held with the patient (and/or patient's legal representative) regarding risks of  anesthesia, which include vomiting, nausea, dental injury and sore throat and emergent situations that may require change in anesthesia plan.    The patient (and/or patient's legal representative) has indicated understanding, his/her questions have been answered, and he/she wishes to proceed with the planned anesthetic.    Blood Products: Not Anticipated     none

## 2025-03-20 ENCOUNTER — OUTPATIENT (OUTPATIENT)
Dept: OUTPATIENT SERVICES | Facility: HOSPITAL | Age: 75
LOS: 1 days | Discharge: HOME | End: 2025-03-20
Payer: MEDICAID

## 2025-03-20 DIAGNOSIS — R06.2 WHEEZING: ICD-10-CM

## 2025-03-20 DIAGNOSIS — Z95.1 PRESENCE OF AORTOCORONARY BYPASS GRAFT: Chronic | ICD-10-CM

## 2025-03-20 PROCEDURE — 71046 X-RAY EXAM CHEST 2 VIEWS: CPT | Mod: 26

## 2025-04-10 ENCOUNTER — NON-APPOINTMENT (OUTPATIENT)
Age: 75
End: 2025-04-10

## 2025-04-10 ENCOUNTER — APPOINTMENT (OUTPATIENT)
Dept: ELECTROPHYSIOLOGY | Facility: CLINIC | Age: 75
End: 2025-04-10
Payer: MEDICAID

## 2025-04-10 PROCEDURE — 93294 REM INTERROG EVL PM/LDLS PM: CPT

## 2025-04-10 PROCEDURE — 93296 REM INTERROG EVL PM/IDS: CPT | Mod: NC

## 2025-04-21 ENCOUNTER — OUTPATIENT (OUTPATIENT)
Dept: OUTPATIENT SERVICES | Facility: HOSPITAL | Age: 75
LOS: 1 days | End: 2025-04-21
Payer: MEDICAID

## 2025-04-21 DIAGNOSIS — Z13.89 ENCOUNTER FOR SCREENING FOR OTHER DISORDER: ICD-10-CM

## 2025-04-21 DIAGNOSIS — Z95.1 PRESENCE OF AORTOCORONARY BYPASS GRAFT: Chronic | ICD-10-CM

## 2025-04-21 PROCEDURE — 71250 CT THORAX DX C-: CPT | Mod: 26

## 2025-04-29 ENCOUNTER — OUTPATIENT (OUTPATIENT)
Dept: OUTPATIENT SERVICES | Facility: HOSPITAL | Age: 75
LOS: 1 days | End: 2025-04-29
Payer: MEDICAID

## 2025-04-29 DIAGNOSIS — R13.10 DYSPHAGIA, UNSPECIFIED: ICD-10-CM

## 2025-04-29 DIAGNOSIS — Z95.1 PRESENCE OF AORTOCORONARY BYPASS GRAFT: Chronic | ICD-10-CM

## 2025-04-30 PROCEDURE — 71045 X-RAY EXAM CHEST 1 VIEW: CPT | Mod: 26

## 2025-05-07 ENCOUNTER — EMERGENCY (EMERGENCY)
Facility: HOSPITAL | Age: 75
LOS: 0 days | Discharge: HOME | End: 2025-05-07
Attending: STUDENT IN AN ORGANIZED HEALTH CARE EDUCATION/TRAINING PROGRAM
Payer: MEDICAID

## 2025-05-07 VITALS
SYSTOLIC BLOOD PRESSURE: 126 MMHG | OXYGEN SATURATION: 99 % | HEART RATE: 86 BPM | DIASTOLIC BLOOD PRESSURE: 80 MMHG | WEIGHT: 187.83 LBS | HEIGHT: 67 IN | TEMPERATURE: 101 F | RESPIRATION RATE: 18 BRPM

## 2025-05-07 VITALS
HEART RATE: 85 BPM | OXYGEN SATURATION: 98 % | SYSTOLIC BLOOD PRESSURE: 108 MMHG | TEMPERATURE: 99 F | DIASTOLIC BLOOD PRESSURE: 70 MMHG | RESPIRATION RATE: 28 BRPM

## 2025-05-07 DIAGNOSIS — Z95.1 PRESENCE OF AORTOCORONARY BYPASS GRAFT: ICD-10-CM

## 2025-05-07 DIAGNOSIS — Z95.0 PRESENCE OF CARDIAC PACEMAKER: ICD-10-CM

## 2025-05-07 DIAGNOSIS — Z95.1 PRESENCE OF AORTOCORONARY BYPASS GRAFT: Chronic | ICD-10-CM

## 2025-05-07 DIAGNOSIS — Z86.73 PERSONAL HISTORY OF TRANSIENT ISCHEMIC ATTACK (TIA), AND CEREBRAL INFARCTION WITHOUT RESIDUAL DEFICITS: ICD-10-CM

## 2025-05-07 DIAGNOSIS — J18.9 PNEUMONIA, UNSPECIFIED ORGANISM: ICD-10-CM

## 2025-05-07 DIAGNOSIS — J44.9 CHRONIC OBSTRUCTIVE PULMONARY DISEASE, UNSPECIFIED: ICD-10-CM

## 2025-05-07 DIAGNOSIS — E11.22 TYPE 2 DIABETES MELLITUS WITH DIABETIC CHRONIC KIDNEY DISEASE: ICD-10-CM

## 2025-05-07 DIAGNOSIS — I25.10 ATHEROSCLEROTIC HEART DISEASE OF NATIVE CORONARY ARTERY WITHOUT ANGINA PECTORIS: ICD-10-CM

## 2025-05-07 DIAGNOSIS — Z95.5 PRESENCE OF CORONARY ANGIOPLASTY IMPLANT AND GRAFT: ICD-10-CM

## 2025-05-07 DIAGNOSIS — R07.9 CHEST PAIN, UNSPECIFIED: ICD-10-CM

## 2025-05-07 DIAGNOSIS — N18.9 CHRONIC KIDNEY DISEASE, UNSPECIFIED: ICD-10-CM

## 2025-05-07 LAB
ALBUMIN SERPL ELPH-MCNC: 2.4 G/DL — LOW (ref 3.3–5)
ALP SERPL-CCNC: 85 U/L — SIGNIFICANT CHANGE UP (ref 40–120)
ALT FLD-CCNC: 25 U/L — SIGNIFICANT CHANGE UP (ref 12–78)
ANION GAP SERPL CALC-SCNC: 6 MMOL/L — SIGNIFICANT CHANGE UP (ref 5–17)
AST SERPL-CCNC: 12 U/L — LOW (ref 15–37)
BASOPHILS # BLD AUTO: 0.06 K/UL — SIGNIFICANT CHANGE UP (ref 0–0.2)
BASOPHILS NFR BLD AUTO: 0.5 % — SIGNIFICANT CHANGE UP (ref 0–2)
BILIRUB SERPL-MCNC: 0.4 MG/DL — SIGNIFICANT CHANGE UP (ref 0.2–1.2)
BUN SERPL-MCNC: 30 MG/DL — HIGH (ref 7–23)
CALCIUM SERPL-MCNC: 8.4 MG/DL — LOW (ref 8.5–10.1)
CHLORIDE SERPL-SCNC: 105 MMOL/L — SIGNIFICANT CHANGE UP (ref 96–108)
CO2 SERPL-SCNC: 28 MMOL/L — SIGNIFICANT CHANGE UP (ref 22–31)
CREAT SERPL-MCNC: 0.85 MG/DL — SIGNIFICANT CHANGE UP (ref 0.5–1.3)
EGFR: 90 ML/MIN/1.73M2 — SIGNIFICANT CHANGE UP
EGFR: 90 ML/MIN/1.73M2 — SIGNIFICANT CHANGE UP
EOSINOPHIL # BLD AUTO: 0.44 K/UL — SIGNIFICANT CHANGE UP (ref 0–0.5)
EOSINOPHIL NFR BLD AUTO: 4 % — SIGNIFICANT CHANGE UP (ref 0–6)
FLUAV AG NPH QL: SIGNIFICANT CHANGE UP
FLUBV AG NPH QL: SIGNIFICANT CHANGE UP
GLUCOSE SERPL-MCNC: 213 MG/DL — HIGH (ref 70–99)
HCT VFR BLD CALC: 43.6 % — SIGNIFICANT CHANGE UP (ref 39–50)
HGB BLD-MCNC: 14.4 G/DL — SIGNIFICANT CHANGE UP (ref 13–17)
IMM GRANULOCYTES NFR BLD AUTO: 0.3 % — SIGNIFICANT CHANGE UP (ref 0–0.9)
LACTATE SERPL-SCNC: 0.9 MMOL/L — SIGNIFICANT CHANGE UP (ref 0.7–2)
LYMPHOCYTES # BLD AUTO: 18.3 % — SIGNIFICANT CHANGE UP (ref 13–44)
LYMPHOCYTES # BLD AUTO: 2.01 K/UL — SIGNIFICANT CHANGE UP (ref 1–3.3)
MCHC RBC-ENTMCNC: 31.3 PG — SIGNIFICANT CHANGE UP (ref 27–34)
MCHC RBC-ENTMCNC: 33 G/DL — SIGNIFICANT CHANGE UP (ref 32–36)
MCV RBC AUTO: 94.8 FL — SIGNIFICANT CHANGE UP (ref 80–100)
MONOCYTES # BLD AUTO: 1.04 K/UL — HIGH (ref 0–0.9)
MONOCYTES NFR BLD AUTO: 9.4 % — SIGNIFICANT CHANGE UP (ref 2–14)
NEUTROPHILS # BLD AUTO: 7.43 K/UL — HIGH (ref 1.8–7.4)
NEUTROPHILS NFR BLD AUTO: 67.5 % — SIGNIFICANT CHANGE UP (ref 43–77)
NRBC BLD AUTO-RTO: 0 /100 WBCS — SIGNIFICANT CHANGE UP (ref 0–0)
NT-PROBNP SERPL-SCNC: 2452 PG/ML — HIGH (ref 0–450)
PLATELET # BLD AUTO: 246 K/UL — SIGNIFICANT CHANGE UP (ref 150–400)
POTASSIUM SERPL-MCNC: 4.2 MMOL/L — SIGNIFICANT CHANGE UP (ref 3.5–5.3)
POTASSIUM SERPL-SCNC: 4.2 MMOL/L — SIGNIFICANT CHANGE UP (ref 3.5–5.3)
PROT SERPL-MCNC: 7.1 GM/DL — SIGNIFICANT CHANGE UP (ref 6–8.3)
RBC # BLD: 4.6 M/UL — SIGNIFICANT CHANGE UP (ref 4.2–5.8)
RBC # FLD: 14.5 % — SIGNIFICANT CHANGE UP (ref 10.3–14.5)
RSV RNA NPH QL NAA+NON-PROBE: SIGNIFICANT CHANGE UP
SARS-COV-2 RNA SPEC QL NAA+PROBE: SIGNIFICANT CHANGE UP
SODIUM SERPL-SCNC: 139 MMOL/L — SIGNIFICANT CHANGE UP (ref 135–145)
SOURCE RESPIRATORY: SIGNIFICANT CHANGE UP
TROPONIN I, HIGH SENSITIVITY RESULT: 28.6 NG/L — SIGNIFICANT CHANGE UP
WBC # BLD: 11.01 K/UL — HIGH (ref 3.8–10.5)
WBC # FLD AUTO: 11.01 K/UL — HIGH (ref 3.8–10.5)

## 2025-05-07 PROCEDURE — 71250 CT THORAX DX C-: CPT | Mod: 26

## 2025-05-07 PROCEDURE — 93010 ELECTROCARDIOGRAM REPORT: CPT

## 2025-05-07 PROCEDURE — 71045 X-RAY EXAM CHEST 1 VIEW: CPT | Mod: 26

## 2025-05-07 PROCEDURE — 99285 EMERGENCY DEPT VISIT HI MDM: CPT

## 2025-05-07 RX ORDER — CEFEPIME 2 G/20ML
1000 INJECTION, POWDER, FOR SOLUTION INTRAVENOUS ONCE
Refills: 0 | Status: COMPLETED | OUTPATIENT
Start: 2025-05-07 | End: 2025-05-07

## 2025-05-07 RX ORDER — ACETAMINOPHEN 500 MG/5ML
975 LIQUID (ML) ORAL ONCE
Refills: 0 | Status: COMPLETED | OUTPATIENT
Start: 2025-05-07 | End: 2025-05-07

## 2025-05-07 RX ORDER — VANCOMYCIN HCL IN 5 % DEXTROSE 1.5G/250ML
1000 PLASTIC BAG, INJECTION (ML) INTRAVENOUS ONCE
Refills: 0 | Status: COMPLETED | OUTPATIENT
Start: 2025-05-07 | End: 2025-05-07

## 2025-05-07 RX ADMIN — Medication 250 MILLIGRAM(S): at 05:41

## 2025-05-07 RX ADMIN — Medication 975 MILLIGRAM(S): at 01:45

## 2025-05-07 RX ADMIN — CEFEPIME 100 MILLIGRAM(S): 2 INJECTION, POWDER, FOR SOLUTION INTRAVENOUS at 04:52

## 2025-05-07 NOTE — ED PROVIDER NOTE - PATIENT PORTAL LINK FT
You can access the FollowMyHealth Patient Portal offered by [facility] by registering at the following website: [email or web address]. By joining [employer]’s FollowMyHealth portal, you will also be able to view your health information using other applications (apps) compatible with our system.

## 2025-05-07 NOTE — ED ADULT NURSE NOTE - OBJECTIVE STATEMENT
Pt is a 74y M AOx3 with a pmh of COPD, CHF, and  pacemaker. Pt from [facility] reports left sided chest pain that started around 3pm. Pt received tylenol to limited relief. Pt tested positive for parainfluenza on 04/30. Pt denies n/v/d, or vision changes.

## 2025-05-07 NOTE — ED PROVIDER NOTE - NSFOLLOWUPINSTRUCTIONS_ED_ALL_ED_FT
Pneumonia    Pneumonia is an infection of the lungs. Pneumonia may be caused by bacteria, viruses, or funguses. Symptoms include coughing, fever, chest pain when breathing deeply or coughing, shortness of breath, fatigue, or muscle aches. Pneumonia can be diagnosed with a medical history and physical exam, as well as other tests which may include a chest X-ray. If you were prescribed an antibiotic medicine, take it as told by your health care provider and do not stop taking the antibiotic even if you start to feel better. Do not use tobacco products, including cigarettes, chewing tobacco, and e-cigarettes.    SEEK IMMEDIATE MEDICAL CARE IF YOU HAVE ANY OF THE FOLLOWING SYMPTOMS: worsening shortness of breath, worsening chest pain, coughing up blood, change in mental status, lightheadedness/dizziness.     Continue vancomycin 1g IV and cefepime 1g IV at [facility] with consultation of morning team

## 2025-05-07 NOTE — ED PROVIDER NOTE - CLINICAL SUMMARY MEDICAL DECISION MAKING FREE TEXT BOX
71 y/o M w/ pmh of COPD, CAD (s/o CABG and PCI), CHF, CVA, CKD, DM, AV block w pacemaker, HTN p/w CP x 1 day. states left sided CP with assocaited cough, mild SOB. diagnosed w parainfluenza 1 week ago, lives in [facility]. febrile at [facility], given tylenol earlier in afternoon. Pt has prior hx of PNA    on exam, pt satting 100% on RA, no signs of resp distress, BP/HR stable, febrile here, rhonchi on lung exam. ddx includes PNA vs viral infection vs PTX vs atypical ACS. pt not requring O2 support at this time    labs EKG XR negatibe, CT ordered which shows worsening consolidation o nL chest, likely explains L chest pain. will treat with IV abx, pt not requriing resp support at this time, spoke to Geovany RN manager, states can manage IV abx , will consult [facility] doc in AM, will give first dose of cefepime or vancomycin

## 2025-05-07 NOTE — ED ADULT TRIAGE NOTE - CHIEF COMPLAINT QUOTE
pt from [facility], c/o left sided chest pain starting tonight r/t left shoulder.  Pt parainfluenza positive on 4/30.  Pt Aox3 in triage.    hx of COPD, CHF, pacemaker,  nkda pt from [facility], c/o left sided chest pain starting tonight r/t left shoulder,   Received tylenol earlier today. Pt parainfluenza positive on 4/30.  Pt Aox3 in triage.    hx of COPD, CHF, pacemaker,  nkda

## 2025-05-07 NOTE — ED PROVIDER NOTE - NS ED ROS FT
CONST: (+) fevers, no chills  EYES: no pain, no vision changes  ENT: no sore throat, no ear pain, no change in hearing  CV: (+) chest pain, no leg swelling  RESP: no shortness of breath, (+) cough  ABD: no abdominal pain, no nausea, no vomiting, no diarrhea  : no dysuria, no flank pain, no hematuria  MSK: no back pain, no extremity pain  NEURO: no headache or additional neurologic complaints  HEME: no easy bleeding  SKIN:  no rash

## 2025-05-07 NOTE — ED PROVIDER NOTE - PHYSICAL EXAMINATION
General: No acute distress, mentation at baseline,  well nourished, well developed  HEENT: NCAT, Neck supple without meningismus, PERRL, no conjunctival injection  Lungs: CTAB,diffuse rhonchi, No retractions, No increased work of breathing  Heart: S1S2 RRR, No M/R/G, Pules equal Bilaterally in upper and lower extremities distally  Abd: soft, NT/ND, No guarding, No rebound.  No hernias, no palpable masses.  Extrem: FROM in all joints, no gross deformities appreciated, no significant edema noted, No ulcers. Cap refil < 2sec.  Skin: No rash noted, warm dry.  Neuro:  Grossly normal.  No difficulty ambulating. No focal deficits.

## 2025-05-08 ENCOUNTER — APPOINTMENT (OUTPATIENT)
Dept: RADIOLOGY | Facility: HOSPITAL | Age: 75
End: 2025-05-08

## 2025-05-08 ENCOUNTER — OUTPATIENT (OUTPATIENT)
Dept: OUTPATIENT SERVICES | Facility: HOSPITAL | Age: 75
LOS: 1 days | End: 2025-05-08
Payer: MEDICAID

## 2025-05-08 DIAGNOSIS — R13.12 DYSPHAGIA, OROPHARYNGEAL PHASE: ICD-10-CM

## 2025-05-08 DIAGNOSIS — Z95.1 PRESENCE OF AORTOCORONARY BYPASS GRAFT: Chronic | ICD-10-CM

## 2025-05-08 PROCEDURE — 70371 SPEECH EVALUATION COMPLEX: CPT | Mod: 26

## 2025-05-21 NOTE — DISCHARGE NOTE NURSING/CASE MANAGEMENT/SOCIAL WORK - NSDCPEPT PROEDHF_GEN_ALL_CORE
No Call primary care provider for follow up after discharge/Monitor weight daily/Activities as tolerated/Report signs and symptoms to primary care provider/Low salt diet

## 2025-05-29 ENCOUNTER — APPOINTMENT (OUTPATIENT)
Dept: NEUROLOGY | Facility: HOSPITAL | Age: 75
End: 2025-05-29

## 2025-05-29 ENCOUNTER — OUTPATIENT (OUTPATIENT)
Dept: OUTPATIENT SERVICES | Facility: HOSPITAL | Age: 75
LOS: 1 days | End: 2025-05-29
Payer: MEDICAID

## 2025-05-29 VITALS
OXYGEN SATURATION: 99 % | SYSTOLIC BLOOD PRESSURE: 148 MMHG | HEIGHT: 65 IN | RESPIRATION RATE: 18 BRPM | HEART RATE: 65 BPM | BODY MASS INDEX: 30.99 KG/M2 | DIASTOLIC BLOOD PRESSURE: 80 MMHG | TEMPERATURE: 98.1 F | WEIGHT: 186 LBS

## 2025-05-29 DIAGNOSIS — R25.1 TREMOR, UNSPECIFIED: ICD-10-CM

## 2025-05-29 DIAGNOSIS — R56.9 UNSPECIFIED CONVULSIONS: ICD-10-CM

## 2025-05-29 DIAGNOSIS — Z95.1 PRESENCE OF AORTOCORONARY BYPASS GRAFT: Chronic | ICD-10-CM

## 2025-05-29 PROCEDURE — G0463: CPT

## 2025-06-02 DIAGNOSIS — R25.1 TREMOR, UNSPECIFIED: ICD-10-CM

## 2025-07-06 NOTE — H&P ADULT - NSHPPOAPULMEMBOLUS_GEN_A_CORE
Problem: Potential for Falls  Goal: Patient will remain free of falls  Description: INTERVENTIONS:  - Educate patient/family on patient safety including physical limitations  - Instruct patient to call for assistance with activity   - Consider consulting OT/PT to assist with strengthening/mobility based on AM PAC & JH-HLM score  - Consult OT/PT to assist with strengthening/mobility   - Keep Call bell within reach  - Keep bed low and locked with side rails adjusted as appropriate  - Keep care items and personal belongings within reach  - Initiate and maintain comfort rounds  - Make Fall Risk Sign visible to staff  - Offer Toileting every 2 Hours, in advance of need  - Initiate/Maintain bed alarm  - Obtain necessary fall risk management equipment:   - Apply yellow socks and bracelet for high fall risk patients  - Consider moving patient to room near nurses station  Outcome: Progressing     Problem: Prexisting or High Potential for Compromised Skin Integrity  Goal: Skin integrity is maintained or improved  Description: INTERVENTIONS:  - Identify patients at risk for skin breakdown  - Assess and monitor skin integrity including under and around medical devices   - Assess and monitor nutrition and hydration status  - Monitor labs  - Assess for incontinence   - Turn and reposition patient  - Assist with mobility/ambulation  - Relieve pressure over oneil prominences   - Avoid friction and shearing  - Provide appropriate hygiene as needed including keeping skin clean and dry  - Evaluate need for skin moisturizer/barrier cream  - Collaborate with interdisciplinary team  - Patient/family teaching  - Consider wound care consult    Assess:  - Review Jonn scale daily  - Clean and moisturize skin every 8 hours  - Inspect skin when repositioning, toileting, and assisting with ADLS  - Assess under medical devices such as mossimo every 2 hours  - Assess extremities for adequate circulation and sensation     Bed Management:  -  Have minimal linens on bed & keep smooth, unwrinkled  - Change linens as needed when moist or perspiring  - Avoid sitting or lying in one position for more than 2 hours while in bed?Keep HOB at 30degrees   - Toileting:  - Offer bedside commode  - Assess for incontinence every 2 hours  - Use incontinent care products after each incontinent episode such as     Activity:  - Mobilize patient 3 times a day  - Encourage activity and walks on unit  - Encourage or provide ROM exercises   - Turn and reposition patient every 2 Hours  - Use appropriate equipment to lift or move patient in bed  - Instruct/ Assist with weight shifting every 2hrs when out of bed in chair  - Consider limitation of chair time 4 hour intervals    Skin Care:  - Avoid use of baby powder, tape, friction and shearing, hot water or constrictive clothing  - Relieve pressure over bony prominences using   - Do not massage red bony areas    Next Steps:  - Teach patient strategies to minimize risks such as   - Consider consults to  interdisciplinary teams such as   Outcome: Progressing     Problem: PAIN - ADULT  Goal: Verbalizes/displays adequate comfort level or baseline comfort level  Description: Interventions:  - Encourage patient to monitor pain and request assistance  - Assess pain using appropriate pain scale  - Administer analgesics as ordered based on type and severity of pain and evaluate response  - Implement non-pharmacological measures as appropriate and evaluate response  - Consider cultural and social influences on pain and pain management  - Notify physician/advanced practitioner if interventions unsuccessful or patient reports new pain  - Educate patient/family on pain management process including their role and importance of  reporting pain   - Provide non-pharmacologic/complimentary pain relief interventions  Outcome: Progressing     Problem: INFECTION - ADULT  Goal: Absence or prevention of progression during hospitalization  Description:  INTERVENTIONS:  - Assess and monitor for signs and symptoms of infection  - Monitor lab/diagnostic results  - Monitor all insertion sites, i.e. indwelling lines, tubes, and drains  - Monitor endotracheal if appropriate and nasal secretions for changes in amount and color  - [place] appropriate cooling/warming therapies per order  - Administer medications as ordered  - Instruct and encourage patient and family to use good hand hygiene technique  - Identify and instruct in appropriate isolation precautions for identified infection/condition  Outcome: Progressing     Problem: SAFETY ADULT  Goal: Patient will remain free of falls  Description: INTERVENTIONS:  - Educate patient/family on patient safety including physical limitations  - Instruct patient to call for assistance with activity   - Consider consulting OT/PT to assist with strengthening/mobility based on AM PAC & -HLM score  - Consult OT/PT to assist with strengthening/mobility   - Keep Call bell within reach  - Keep bed low and locked with side rails adjusted as appropriate  - Keep care items and personal belongings within reach  - Initiate and maintain comfort rounds  - Make Fall Risk Sign visible to staff  - Offer Toileting every 2 Hours, in advance of need  - Initiate/Maintain bed alarm  - Obtain necessary fall risk management equipment:   - Apply yellow socks and bracelet for high fall risk patients  - Consider moving patient to room near nurses station  Outcome: Progressing     Problem: Knowledge Deficit  Goal: Patient/family/caregiver demonstrates understanding of disease process, treatment plan, medications, and discharge instructions  Description: Complete learning assessment and assess knowledge base.  Interventions:  - Provide teaching at level of understanding  - Provide teaching via preferred learning methods  Outcome: Progressing      no

## 2025-07-07 NOTE — OCCUPATIONAL THERAPY INITIAL EVALUATION ADULT - BED MOBILITY/TRANSFERS, PREVIOUS LEVEL OF FUNCTION, OT EVAL
Orders:    Semaglutide-Weight Management (Wegovy) 2.4 MG/0.75ML; Inject 0.75 mL (2.4 mg total) under the skin once a week    
independent

## 2025-07-31 ENCOUNTER — APPOINTMENT (OUTPATIENT)
Dept: NEUROLOGY | Facility: HOSPITAL | Age: 75
End: 2025-07-31

## 2025-07-31 ENCOUNTER — OUTPATIENT (OUTPATIENT)
Dept: OUTPATIENT SERVICES | Facility: HOSPITAL | Age: 75
LOS: 1 days | End: 2025-07-31
Payer: MEDICAID

## 2025-07-31 VITALS
HEIGHT: 65 IN | RESPIRATION RATE: 18 BRPM | TEMPERATURE: 98 F | HEART RATE: 60 BPM | BODY MASS INDEX: 30.99 KG/M2 | SYSTOLIC BLOOD PRESSURE: 128 MMHG | WEIGHT: 186 LBS | DIASTOLIC BLOOD PRESSURE: 76 MMHG

## 2025-07-31 DIAGNOSIS — Z86.69 PERSONAL HISTORY OF OTHER DISEASES OF THE NERVOUS SYSTEM AND SENSE ORGANS: ICD-10-CM

## 2025-07-31 DIAGNOSIS — R25.1 TREMOR, UNSPECIFIED: ICD-10-CM

## 2025-07-31 LAB — GLUCOSE BLDC GLUCOMTR-MCNC: 132 MG/DL — HIGH (ref 70–99)

## 2025-07-31 PROCEDURE — 82962 GLUCOSE BLOOD TEST: CPT

## 2025-07-31 PROCEDURE — G0463: CPT

## 2025-07-31 RX ORDER — PRIMIDONE 50 MG/1
50 TABLET ORAL 3 TIMES DAILY
Qty: 90 | Refills: 2 | Status: ACTIVE | COMMUNITY
Start: 2025-07-31 | End: 1900-01-01

## 2025-08-01 LAB — GLUCOSE BLDC GLUCOMTR-MCNC: 132

## 2025-08-06 DIAGNOSIS — R25.1 TREMOR, UNSPECIFIED: ICD-10-CM

## 2025-08-11 ENCOUNTER — APPOINTMENT (OUTPATIENT)
Dept: ELECTROPHYSIOLOGY | Facility: CLINIC | Age: 75
End: 2025-08-11
Payer: MEDICAID

## 2025-08-11 ENCOUNTER — NON-APPOINTMENT (OUTPATIENT)
Age: 75
End: 2025-08-11

## 2025-08-11 PROCEDURE — 93296 REM INTERROG EVL PM/IDS: CPT | Mod: NC

## 2025-08-11 PROCEDURE — 93294 REM INTERROG EVL PM/LDLS PM: CPT

## (undated) DEVICE — FRA-ESU BOVIE FORCE TRIAD T7J19717DX: Type: DURABLE MEDICAL EQUIPMENT